# Patient Record
Sex: MALE | Race: WHITE | NOT HISPANIC OR LATINO | Employment: OTHER | URBAN - METROPOLITAN AREA
[De-identification: names, ages, dates, MRNs, and addresses within clinical notes are randomized per-mention and may not be internally consistent; named-entity substitution may affect disease eponyms.]

---

## 2020-12-07 ENCOUNTER — HOSPITAL ENCOUNTER (EMERGENCY)
Facility: HOSPITAL | Age: 55
Discharge: HOME | End: 2020-12-07
Attending: EMERGENCY MEDICINE

## 2020-12-07 VITALS
HEART RATE: 92 BPM | TEMPERATURE: 99.8 F | DIASTOLIC BLOOD PRESSURE: 103 MMHG | SYSTOLIC BLOOD PRESSURE: 216 MMHG | OXYGEN SATURATION: 96 % | RESPIRATION RATE: 20 BRPM | WEIGHT: 285.94 LBS

## 2020-12-07 DIAGNOSIS — L02.212 ABSCESS OF BACK: Primary | ICD-10-CM

## 2020-12-07 PROCEDURE — 10061 I&D ABSCESS COMP/MULTIPLE: CPT | Performed by: EMERGENCY MEDICINE

## 2020-12-07 PROCEDURE — 99284 EMERGENCY DEPT VISIT MOD MDM: CPT | Performed by: EMERGENCY MEDICINE

## 2020-12-07 PROCEDURE — 99283 EMERGENCY DEPT VISIT LOW MDM: CPT

## 2020-12-07 RX ORDER — LIDOCAINE HYDROCHLORIDE AND EPINEPHRINE 10; 10 MG/ML; UG/ML
20 INJECTION, SOLUTION INFILTRATION; PERINEURAL ONCE
Status: COMPLETED | OUTPATIENT
Start: 2020-12-07 | End: 2020-12-07

## 2020-12-07 RX ORDER — TRAMADOL HYDROCHLORIDE 50 MG/1
1 TABLET ORAL EVERY 6 HOURS PRN
COMMUNITY
Start: 2012-09-11 | End: 2021-05-19

## 2020-12-07 RX ORDER — SULFAMETHOXAZOLE AND TRIMETHOPRIM 800; 160 MG/1; MG/1
1 TABLET ORAL 2 TIMES DAILY
Qty: 14 TABLET | Refills: 0 | Status: SHIPPED | OUTPATIENT
Start: 2020-12-07 | End: 2020-12-14

## 2020-12-07 RX ORDER — SULFAMETHOXAZOLE AND TRIMETHOPRIM 800; 160 MG/1; MG/1
1 TABLET ORAL ONCE
Status: COMPLETED | OUTPATIENT
Start: 2020-12-07 | End: 2020-12-07

## 2020-12-07 RX ADMIN — LIDOCAINE HYDROCHLORIDE AND EPINEPHRINE 20 ML: 10; 10 INJECTION, SOLUTION INFILTRATION; PERINEURAL at 20:41

## 2020-12-07 RX ADMIN — SULFAMETHOXAZOLE AND TRIMETHOPRIM 1 TABLET: 800; 160 TABLET ORAL at 20:41

## 2020-12-09 ENCOUNTER — HOSPITAL ENCOUNTER (EMERGENCY)
Facility: HOSPITAL | Age: 55
Discharge: HOME | End: 2020-12-09
Attending: EMERGENCY MEDICINE

## 2020-12-09 VITALS
WEIGHT: 283.95 LBS | RESPIRATION RATE: 20 BRPM | OXYGEN SATURATION: 97 % | DIASTOLIC BLOOD PRESSURE: 106 MMHG | HEART RATE: 79 BPM | SYSTOLIC BLOOD PRESSURE: 198 MMHG | TEMPERATURE: 98.3 F

## 2020-12-09 DIAGNOSIS — Z51.89 WOUND CHECK, ABSCESS: Primary | ICD-10-CM

## 2020-12-09 PROCEDURE — 99282 EMERGENCY DEPT VISIT SF MDM: CPT

## 2020-12-09 PROCEDURE — 99024 POSTOP FOLLOW-UP VISIT: CPT | Performed by: PHYSICIAN ASSISTANT

## 2021-02-10 ENCOUNTER — OFFICE VISIT (OUTPATIENT)
Dept: FAMILY MEDICINE CLINIC | Facility: CLINIC | Age: 56
End: 2021-02-10
Payer: MEDICARE

## 2021-02-10 VITALS
BODY MASS INDEX: 39.11 KG/M2 | RESPIRATION RATE: 16 BRPM | OXYGEN SATURATION: 97 % | WEIGHT: 279.4 LBS | HEIGHT: 71 IN | SYSTOLIC BLOOD PRESSURE: 144 MMHG | DIASTOLIC BLOOD PRESSURE: 96 MMHG | TEMPERATURE: 99.4 F | HEART RATE: 94 BPM

## 2021-02-10 DIAGNOSIS — Z78.9 NEED FOR FOLLOW-UP BY SOCIAL WORKER: Primary | ICD-10-CM

## 2021-02-10 DIAGNOSIS — I10 ESSENTIAL HYPERTENSION: ICD-10-CM

## 2021-02-10 DIAGNOSIS — L72.3 SEBACEOUS CYST: Primary | ICD-10-CM

## 2021-02-10 DIAGNOSIS — R73.9 HIGH BLOOD SUGAR: ICD-10-CM

## 2021-02-10 PROCEDURE — 99214 OFFICE O/P EST MOD 30 MIN: CPT | Performed by: NURSE PRACTITIONER

## 2021-02-10 RX ORDER — CARVEDILOL 25 MG/1
25 TABLET ORAL 2 TIMES DAILY WITH MEALS
Qty: 60 TABLET | Refills: 1 | Status: SHIPPED | OUTPATIENT
Start: 2021-02-10 | End: 2021-03-25 | Stop reason: SDUPTHER

## 2021-02-10 RX ORDER — LISINOPRIL AND HYDROCHLOROTHIAZIDE 20; 12.5 MG/1; MG/1
1 TABLET ORAL DAILY
Qty: 30 TABLET | Refills: 1 | Status: SHIPPED | OUTPATIENT
Start: 2021-02-10 | End: 2021-03-08 | Stop reason: SDUPTHER

## 2021-02-10 RX ORDER — CARVEDILOL 25 MG/1
25 TABLET ORAL 2 TIMES DAILY WITH MEALS
COMMUNITY
End: 2021-02-10 | Stop reason: SDUPTHER

## 2021-02-10 RX ORDER — VITAMIN E 268 MG
400 CAPSULE ORAL DAILY
COMMUNITY

## 2021-02-10 RX ORDER — ACETAMINOPHEN 500 MG
TABLET ORAL DAILY
COMMUNITY
End: 2022-04-01 | Stop reason: ALTCHOICE

## 2021-02-10 RX ORDER — LISINOPRIL AND HYDROCHLOROTHIAZIDE 20; 12.5 MG/1; MG/1
1 TABLET ORAL DAILY
COMMUNITY
End: 2021-02-10 | Stop reason: SDUPTHER

## 2021-02-10 NOTE — PROGRESS NOTES
Subjective:   Chief Complaint   Patient presents with    Establish Care     boiled on back         Patient ID: Christiano Forrest is a 60 y o  male  Presents today to establish care and also concern over abscess on his back  He was treated in the ER with incision and drainage and Bactrim for 7 days in December  It has since closed up but is become more painful over the 3 weeks  His blood pressure today is elevated and he hasn't had his blood pressure medications for sometime  He has been on tramadol for bilateral upper and lower extremities pain  Pain is described as burning tingling pain that occasionally becomes very sharp  He also has history of high blood sugar  Discussed with him having  contact him to assist with insurance needs  The following portions of the patient's history were reviewed and updated as appropriate: allergies, current medications, past family history, past medical history, past social history, past surgical history and problem list     Review of Systems   Constitutional: Negative for chills, fatigue and fever  Respiratory: Negative for cough and shortness of breath  Cardiovascular: Negative for palpitations  Skin: Positive for wound  Psychiatric/Behavioral: Negative for dysphoric mood  The patient is not nervous/anxious  Objective:  Vitals:    02/10/21 1606   BP: 144/96   BP Location: Left arm   Patient Position: Sitting   Cuff Size: Large   Pulse: 94   Resp: 16   Temp: 99 4 °F (37 4 °C)   TempSrc: Tympanic   SpO2: 97%   Weight: 127 kg (279 lb 6 4 oz)   Height: 5' 11" (1 803 m)      Physical Exam  Constitutional:       Appearance: Normal appearance  He is obese  Cardiovascular:      Rate and Rhythm: Normal rate and regular rhythm  Pulses: Normal pulses  Heart sounds: Normal heart sounds  Pulmonary:      Effort: Pulmonary effort is normal       Breath sounds: Normal breath sounds  No wheezing     Skin:     General: Skin is warm and dry           Neurological:      Mental Status: He is alert and oriented to person, place, and time  Psychiatric:         Mood and Affect: Mood normal          Behavior: Behavior normal            Assessment/Plan:    No problem-specific Assessment & Plan notes found for this encounter  Diagnoses and all orders for this visit:    Sebaceous cyst  -     Ambulatory referral to General Surgery; Future    Essential hypertension  -     carvedilol (COREG) 25 mg tablet; Take 1 tablet (25 mg total) by mouth 2 (two) times a day with meals  -     lisinopril-hydrochlorothiazide (PRINZIDE,ZESTORETIC) 20-12 5 MG per tablet; Take 1 tablet by mouth daily  -     CBC and differential; Future    High blood sugar  -     Comprehensive metabolic panel; Future  -     Hemoglobin A1C; Future    Other orders  -     Discontinue: carvedilol (COREG) 25 mg tablet; Take 25 mg by mouth 2 (two) times a day with meals  -     Discontinue: lisinopril-hydrochlorothiazide (PRINZIDE,ZESTORETIC) 20-12 5 MG per tablet; Take 1 tablet by mouth daily  -     vitamin E, tocopherol, 400 units capsule; Take 400 Units by mouth daily  -     Chromium Picolinate 500 MCG TABS; Take by mouth  -     beta carotene 30 MG capsule; Take 30 mg by mouth daily  -     Calcium Carbonate-Vit D-Min (Calcium 1200) 9189-8301 MG-UNIT CHEW; Chew  -     Potassium 99 MG TABS; Take by mouth  -     [employer] CINNAMON PO; Take by mouth  -     Zinc 50 MG CAPS; Take by mouth  -     Garlic 5483 MG CAPS; Take by mouth  -     Magnesium-Calcium-Folic Acid (MAGNEBIND 176 PO);  Take by mouth

## 2021-02-12 ENCOUNTER — PATIENT OUTREACH (OUTPATIENT)
Dept: FAMILY MEDICINE CLINIC | Facility: CLINIC | Age: 56
End: 2021-02-12

## 2021-02-15 DIAGNOSIS — Z78.9 NEEDS ASSISTANCE WITH COMMUNITY RESOURCES: Primary | ICD-10-CM

## 2021-02-15 NOTE — PROGRESS NOTES
SHERRIE SNOW referral for patient needing assistance with insurance  SHERRIE SNOW has been playing phone tag with patient, but spoke with Dimas Jones, and she will follow up with patient to see if she can assist with MA application  No other psychosocial issues at time of this encounter  SHERRIE SNOW to continue to follow for additional support/resources as needed

## 2021-02-16 ENCOUNTER — PATIENT OUTREACH (OUTPATIENT)
Dept: CASE MANAGEMENT | Facility: HOSPITAL | Age: 56
End: 2021-02-16

## 2021-02-16 NOTE — PROGRESS NOTES
CHW made outreach call to pt to assist with MA application  Pt did not answer left voicemail with contact information

## 2021-02-18 ENCOUNTER — PATIENT OUTREACH (OUTPATIENT)
Dept: CASE MANAGEMENT | Facility: HOSPITAL | Age: 56
End: 2021-02-18

## 2021-02-18 NOTE — PROGRESS NOTES
CHW received referral from SHERRIE Zuniga to assist pt with MA application  CHW made outreach call to pt and introduce myself and explained the process of applying for MA  Pt stated that he receives social security disability only  Pt stated that he would like the MA application to be mailed to him so that he may complete it  CHW will mail out the application and explained to pt if he needs any other help he may contact me  CHW will follow up with pt in two weeks

## 2021-02-20 ENCOUNTER — LAB (OUTPATIENT)
Dept: LAB | Facility: HOSPITAL | Age: 56
End: 2021-02-20

## 2021-02-20 DIAGNOSIS — I10 ESSENTIAL HYPERTENSION: ICD-10-CM

## 2021-02-20 DIAGNOSIS — R73.9 HIGH BLOOD SUGAR: ICD-10-CM

## 2021-02-20 LAB
ALBUMIN SERPL BCP-MCNC: 3.7 G/DL (ref 3.5–5)
ALP SERPL-CCNC: 53 U/L (ref 46–116)
ALT SERPL W P-5'-P-CCNC: 33 U/L (ref 12–78)
ANION GAP SERPL CALCULATED.3IONS-SCNC: 9 MMOL/L (ref 4–13)
AST SERPL W P-5'-P-CCNC: 16 U/L (ref 5–45)
BASOPHILS # BLD AUTO: 0.05 THOUSANDS/ΜL (ref 0–0.1)
BASOPHILS NFR BLD AUTO: 1 % (ref 0–1)
BILIRUB SERPL-MCNC: 0.43 MG/DL (ref 0.2–1)
BUN SERPL-MCNC: 20 MG/DL (ref 5–25)
CALCIUM SERPL-MCNC: 9.2 MG/DL (ref 8.3–10.1)
CHLORIDE SERPL-SCNC: 95 MMOL/L (ref 100–108)
CO2 SERPL-SCNC: 28 MMOL/L (ref 21–32)
CREAT SERPL-MCNC: 1.37 MG/DL (ref 0.6–1.3)
EOSINOPHIL # BLD AUTO: 0.25 THOUSAND/ΜL (ref 0–0.61)
EOSINOPHIL NFR BLD AUTO: 5 % (ref 0–6)
ERYTHROCYTE [DISTWIDTH] IN BLOOD BY AUTOMATED COUNT: 12.7 % (ref 11.6–15.1)
EST. AVERAGE GLUCOSE BLD GHB EST-MCNC: 246 MG/DL
GFR SERPL CREATININE-BSD FRML MDRD: 56 ML/MIN/1.73SQ M
GLUCOSE SERPL-MCNC: 405 MG/DL (ref 65–140)
HBA1C MFR BLD: 10.2 %
HCT VFR BLD AUTO: 41.5 % (ref 36.5–49.3)
HGB BLD-MCNC: 13.6 G/DL (ref 12–17)
IMM GRANULOCYTES # BLD AUTO: 0.01 THOUSAND/UL (ref 0–0.2)
IMM GRANULOCYTES NFR BLD AUTO: 0 % (ref 0–2)
LYMPHOCYTES # BLD AUTO: 1.25 THOUSANDS/ΜL (ref 0.6–4.47)
LYMPHOCYTES NFR BLD AUTO: 23 % (ref 14–44)
MCH RBC QN AUTO: 30.1 PG (ref 26.8–34.3)
MCHC RBC AUTO-ENTMCNC: 32.8 G/DL (ref 31.4–37.4)
MCV RBC AUTO: 92 FL (ref 82–98)
MONOCYTES # BLD AUTO: 0.55 THOUSAND/ΜL (ref 0.17–1.22)
MONOCYTES NFR BLD AUTO: 10 % (ref 4–12)
NEUTROPHILS # BLD AUTO: 3.36 THOUSANDS/ΜL (ref 1.85–7.62)
NEUTS SEG NFR BLD AUTO: 61 % (ref 43–75)
NRBC BLD AUTO-RTO: 0 /100 WBCS
PLATELET # BLD AUTO: 221 THOUSANDS/UL (ref 149–390)
PMV BLD AUTO: 10.3 FL (ref 8.9–12.7)
POTASSIUM SERPL-SCNC: 4.8 MMOL/L (ref 3.5–5.3)
PROT SERPL-MCNC: 8.1 G/DL (ref 6.4–8.2)
RBC # BLD AUTO: 4.52 MILLION/UL (ref 3.88–5.62)
SODIUM SERPL-SCNC: 132 MMOL/L (ref 136–145)
WBC # BLD AUTO: 5.47 THOUSAND/UL (ref 4.31–10.16)

## 2021-02-20 PROCEDURE — 36415 COLL VENOUS BLD VENIPUNCTURE: CPT

## 2021-02-20 PROCEDURE — 80053 COMPREHEN METABOLIC PANEL: CPT

## 2021-02-20 PROCEDURE — 83036 HEMOGLOBIN GLYCOSYLATED A1C: CPT

## 2021-02-20 PROCEDURE — 85025 COMPLETE CBC W/AUTO DIFF WBC: CPT

## 2021-02-25 ENCOUNTER — TELEPHONE (OUTPATIENT)
Dept: FAMILY MEDICINE CLINIC | Facility: CLINIC | Age: 56
End: 2021-02-25

## 2021-02-25 NOTE — TELEPHONE ENCOUNTER
Patient presented today for an appt that he had yesterday  He was very irate and states he was told his appt is Thursday, not Wednesday and refused to leave  I spoke with Jeff Snider and was told to let the patient know that she is unavailable and will call him later today when she is done with patients  He reluctantly left the office and said that he is borrowing a car and wants a script called into his pharmacy in [place] and wants to be called at [phone] because his cell phone number is not in service right now

## 2021-02-26 ENCOUNTER — TELEPHONE (OUTPATIENT)
Dept: FAMILY MEDICINE CLINIC | Facility: CLINIC | Age: 56
End: 2021-02-26

## 2021-02-26 NOTE — TELEPHONE ENCOUNTER
annabel called back again and states you didn't call him on the number he left you so he would like a call back on Monday at [phone]

## 2021-03-01 DIAGNOSIS — IMO0002 TYPE 2 DIABETES, UNCONTROLLED, WITH NEUROPATHY: Primary | ICD-10-CM

## 2021-03-01 RX ORDER — BLOOD-GLUCOSE METER
KIT MISCELLANEOUS DAILY
Qty: 1 KIT | Refills: 0 | Status: SHIPPED | OUTPATIENT
Start: 2021-03-01

## 2021-03-01 RX ORDER — BLOOD SUGAR DIAGNOSTIC
STRIP MISCELLANEOUS
Qty: 100 EACH | Refills: 3 | Status: SHIPPED | OUTPATIENT
Start: 2021-03-01

## 2021-03-01 RX ORDER — GABAPENTIN 100 MG/1
100 CAPSULE ORAL 3 TIMES DAILY
Qty: 90 CAPSULE | Refills: 1 | Status: SHIPPED | OUTPATIENT
Start: 2021-03-01 | End: 2021-03-31 | Stop reason: SDUPTHER

## 2021-03-01 RX ORDER — INSULIN GLARGINE 100 [IU]/ML
5 INJECTION, SOLUTION SUBCUTANEOUS DAILY
Qty: 5 PEN | Refills: 1 | Status: SHIPPED | OUTPATIENT
Start: 2021-03-01 | End: 2021-03-23

## 2021-03-01 NOTE — TELEPHONE ENCOUNTER
Spoke with patient regarding lab work showing that he is a diabetic uncontrolled with an A1c of 10 2 discussed with him we need to begin insulin and he needs an appointment as soon as possible for instruction  Discussed with him I would send a medication at this point to get him started to help lower his blood sugar

## 2021-03-03 ENCOUNTER — TELEPHONE (OUTPATIENT)
Dept: SURGERY | Facility: CLINIC | Age: 56
End: 2021-03-03

## 2021-03-03 NOTE — TELEPHONE ENCOUNTER
Called patient 3 x to schedule appt for Dr Indigo Ruiz    Unable to leave message as voice mail was unintelligible as to whose phone number it was

## 2021-03-04 ENCOUNTER — PATIENT OUTREACH (OUTPATIENT)
Dept: CASE MANAGEMENT | Facility: HOSPITAL | Age: 56
End: 2021-03-04

## 2021-03-04 NOTE — PROGRESS NOTES
CHW made outreach call to patient to follow up regarding MA application that was mailed  Pt did not answer left a message with contact information to call back

## 2021-03-05 NOTE — PROGRESS NOTES
CHW made an outreach call to pt to follow up with the MA process  Pt stated that he already had the MA and has chosen [facility] and is currently looking for a secondary insurance  CHW will be closing the case due to pt not needing any other resources  Did advise if there is anything he made need we can reopen the case

## 2021-03-08 ENCOUNTER — TELEPHONE (OUTPATIENT)
Dept: PHYSICAL THERAPY | Facility: OTHER | Age: 56
End: 2021-03-08

## 2021-03-08 ENCOUNTER — OFFICE VISIT (OUTPATIENT)
Dept: FAMILY MEDICINE CLINIC | Facility: CLINIC | Age: 56
End: 2021-03-08
Payer: MEDICARE

## 2021-03-08 VITALS
TEMPERATURE: 99 F | WEIGHT: 281 LBS | RESPIRATION RATE: 16 BRPM | HEART RATE: 80 BPM | HEIGHT: 71 IN | SYSTOLIC BLOOD PRESSURE: 134 MMHG | OXYGEN SATURATION: 97 % | DIASTOLIC BLOOD PRESSURE: 94 MMHG | BODY MASS INDEX: 39.34 KG/M2

## 2021-03-08 DIAGNOSIS — IMO0002 TYPE 2 DIABETES, UNCONTROLLED, WITH NEUROPATHY: Primary | ICD-10-CM

## 2021-03-08 DIAGNOSIS — M54.9 CHRONIC NECK AND BACK PAIN: ICD-10-CM

## 2021-03-08 DIAGNOSIS — Z12.12 SCREENING FOR COLORECTAL CANCER: ICD-10-CM

## 2021-03-08 DIAGNOSIS — G89.29 CHRONIC NECK AND BACK PAIN: ICD-10-CM

## 2021-03-08 DIAGNOSIS — Z12.11 SCREENING FOR COLORECTAL CANCER: ICD-10-CM

## 2021-03-08 DIAGNOSIS — I10 ESSENTIAL HYPERTENSION: ICD-10-CM

## 2021-03-08 DIAGNOSIS — M54.2 CHRONIC NECK AND BACK PAIN: ICD-10-CM

## 2021-03-08 PROCEDURE — 99214 OFFICE O/P EST MOD 30 MIN: CPT | Performed by: NURSE PRACTITIONER

## 2021-03-08 RX ORDER — LISINOPRIL AND HYDROCHLOROTHIAZIDE 20; 12.5 MG/1; MG/1
2 TABLET ORAL DAILY
Qty: 30 TABLET | Refills: 1 | Status: SHIPPED | OUTPATIENT
Start: 2021-03-08 | End: 2021-03-25 | Stop reason: SDUPTHER

## 2021-03-08 NOTE — PROGRESS NOTES
Subjective:   Chief Complaint   Patient presents with    Diabetes        Patient ID: Kate Dickinson is a 60 y o  male  Presents today for follow up on labs and diabetic instruction  Patient did not bring his diabetic supplies nor his insulin to this visit  States he only has access to a vehicle twice a week and he was unable to go get his Lantus or his diabetic supply  Patient also states that he is well-versed in how to check blood sugars as well as inject insulin  Patient was diagnosed with type 2 diabetes approximately 2012 and has been on medication a couple of times since then  States he tried metformin for a year and secondary to GI symptoms he will not go back on it  A1c on February 20th was 10 2 on absolutely no medication for a significant period of time  Did discuss with patient starting 5 units of Lantus nightly and increase by 5 units every 6th day until blood sugars under 140  Patient was also placed on Jardiance which she has not picked up at this time 10 mg daily  Did discuss with patient going to Endocrinology and diabetic Education  Patient did agreed Endocrinology but refused diabetic Education  Patient did also refused a diabetic foot exam today secondary to lower back pain  Discussed with patient's the urgent nature of getting his blood sugar under control and the risks for cardiac, eye site, CVA, and cardiovascular  Patient is referred to Ophthalmology for a diabetic eye exam   Did discuss with patient he is very resistant to most suggestions and possibly I was not the right fit for his care  Patient did verbalize that he is appreciative of efforts and he just knows what did not work for him in the past   Patient was placed on gabapentin a couple of weeks ago secondary to neuropathy with good relief with 100 mg 3 times a day  He however has multiple injuries and issues with both back and neck that does not allow him a good night sleep and constant chronic pain    Did discuss with patient did not having any records in the system with exactly what was going on with his back would refer him to the Comprehensive Spine program   Blood pressure today is 134/94 patient has restarted his lisinopril 20 mg with hydrochlorothiazide 12 5  Patient stated he had previously been on 40 mg lisinopril with 25 hydrochlorothiazide daily  New script was sent for lisinopril 20 with hydrochlorothiazide 12 52 tablets daily  Patient will come back in 2 weeks for blood pressure check as well as a CMP  More than half of this 20 minute visit spent counseling and coordinating care  The following portions of the patient's history were reviewed and updated as appropriate: allergies, current medications, past family history, past medical history, past social history, past surgical history and problem list     Review of Systems   Constitutional: Negative for chills, fatigue and fever  Respiratory: Negative for cough and shortness of breath  Cardiovascular: Negative for palpitations  Musculoskeletal: Positive for back pain and neck pain  Neurological: Positive for numbness (improved on gabapentin)  Psychiatric/Behavioral: Negative for dysphoric mood  The patient is not nervous/anxious  Objective:  Vitals:    03/08/21 1333   BP: 134/94   BP Location: Left arm   Patient Position: Sitting   Cuff Size: Large   Pulse: 80   Resp: 16   Temp: 99 °F (37 2 °C)   TempSrc: Tympanic   SpO2: 97%   Weight: 127 kg (281 lb)   Height: 5' 11" (1 803 m)      Physical Exam  Vitals signs reviewed  Constitutional:       Appearance: Normal appearance  Cardiovascular:      Rate and Rhythm: Normal rate and regular rhythm  Pulses: Normal pulses  Heart sounds: Normal heart sounds  Pulmonary:      Effort: Pulmonary effort is normal       Breath sounds: Normal breath sounds  Neurological:      Mental Status: He is alert and oriented to person, place, and time     Psychiatric:         Mood and Affect: Mood normal          Behavior: Behavior normal            Assessment/Plan:    No problem-specific Assessment & Plan notes found for this encounter  Diagnoses and all orders for this visit:    Type 2 diabetes, uncontrolled, with neuropathy ([facility] [address] )  -     IRIS Diabetic eye exam  -     Ambulatory referral to Endocrinology; Future  -     Ambulatory referral to Ophthalmology; Future    Essential hypertension  -     lisinopril-hydrochlorothiazide (PRINZIDE,ZESTORETIC) 20-12 5 MG per tablet; Take 2 tablets by mouth daily  -     Comprehensive metabolic panel; Future    Screening for colorectal cancer  -     Cologuard; Future    Chronic neck and back pain  -     Ambulatory Referral to Comprehensive Spine Program; Future    Other orders  -     Cancel: Hepatitis C Antibody ([facility]); Future  -     Cancel: PNEUMOCOCCAL POLYSACCHARIDE VACCINE 23-VALENT =>1YO SQ IM  -     Cancel: HIV 1/2 Antigen/Antibody (4th Generation) w Reflex [facility]; Future  -     Cancel: Ambulatory referral to Gastroenterology;  Future

## 2021-03-08 NOTE — TELEPHONE ENCOUNTER
Voice mail/message left requesting patient to return call to [place] [id number] program including our hours of business and phone number  Kindly asked to LM with Full Name,  and Reminded CB will come from a non- number as the nurses are working remotely/off-site  Referral deferred for f/u attempt per protocol    Note: Nurse rec'd message from Donald Gonsalez at [facility] regarding pts referral to schedule appointment  Nurse returned call to [facility] for patient info  Informed nurse would call & triage

## 2021-03-09 ENCOUNTER — PATIENT OUTREACH (OUTPATIENT)
Dept: FAMILY MEDICINE CLINIC | Facility: CLINIC | Age: 56
End: 2021-03-09

## 2021-03-09 NOTE — PROGRESS NOTES
As per patient, he has [employer] and does not need the MA application our CHW mailed to him  As per documentation, CHW followed up with patient to see if she could assist with the MA application and patient does not want her assistance at this time  No other psychosocial needs at time of this encounter and SHERRIE SNOW is closing patient to Care Management  SHERRIE SNOW remains available for additional support as needed via order

## 2021-03-11 ENCOUNTER — TELEPHONE (OUTPATIENT)
Dept: PHYSICAL THERAPY | Facility: OTHER | Age: 56
End: 2021-03-11

## 2021-03-11 ENCOUNTER — NURSE TRIAGE (OUTPATIENT)
Dept: PHYSICAL THERAPY | Facility: OTHER | Age: 56
End: 2021-03-11

## 2021-03-11 DIAGNOSIS — M54.9 OTHER CHRONIC BACK PAIN: Primary | ICD-10-CM

## 2021-03-11 DIAGNOSIS — G89.29 OTHER CHRONIC BACK PAIN: Primary | ICD-10-CM

## 2021-03-11 NOTE — TELEPHONE ENCOUNTER
Pt LM requesting CB and to use a ph number that is not listed in demographics  Nurse reached out using that number and pt did not answer  Voice mail/message left requesting patient to return call to [facility] program including our hours of business and phone number  Kindly asked to LM with Full Name,  and Reminded CB will come from a non- number as the nurses are working remotely/off-site  4th attempt to connect with patient regarding referral to comp spine  Closed and will await CB from Pt

## 2021-03-11 NOTE — TELEPHONE ENCOUNTER
Voice mail/message left requesting patient to return call to [employer] program including our hours of business and phone number  Kindly asked to LM with Full Name,  and Reminded CB will come from a non- number as the nurses are working remotely/off-site  Referral deferred for f/u attempt per protocol    Note: Pt LM for [facility] requesting CB yesterday afternoon  Nurse reached out and encouraged pt LM if no Live answer including a "good" time to call  Reminded nurses are working off-site and remotely and referral is in our work queue

## 2021-03-11 NOTE — TELEPHONE ENCOUNTER
Additional Information   Negative: Is this related to a work injury?  Negative: Is this related to an MVA?  Negative: Are you currently recieving homecare services?  Negative: Has the patient had unexplained weight loss?  Negative: Does the patient have a fever?  Negative: Is the patient experiencing blood in sputum?  Negative: Is the patient experiencing urine retention?  Negative: Is the patient experiencing acute drop foot or paralysis?  Negative: Has the patient experienced major trauma? (fall from height, high speed collision, direct blow to spine) and is also experiencing nausea, light-headedness, or loss of consciousness?  Affirmative: Is this a chronic condition? Background - Initial Assessment  Clinical complaint: Patient states he has pain through out his entire back with numbness and tingling in the upper and lower extremeties  States he has had the pain for about 20 yrs and it has gotten wore over the lasrt 10 yrs  Patient states he has seen PT, pain management, ortho, and neurosurgery in the past with minimal relief  Date of onset: ovre 20 yrs ago  Frequency of pain: constant  Quality of pain: aching, stabbing and throbbing    Protocols used: [facility] COMPREHENSIVE SPINE PROGRAM PROTOCOL    This RN did review in detail the Comprehensive Spine Program and what we can provide for their back pain  Patient is agreeable to being triaged by this RN and would like to proceed with Physical Therapy  Referral was placed for Physical Therapy at the [facility] site  Patients information was sent to the  to make evaluation appointment  Patient made aware that the PT office  will be calling to schedule the appointment  Patient was provided with the phone number to the PT office  No further questions and/or concerns were voiced by the patient at this time  Patient states understanding of the referral that was placed

## 2021-03-18 ENCOUNTER — TELEPHONE (OUTPATIENT)
Dept: PHYSICAL THERAPY | Facility: OTHER | Age: 56
End: 2021-03-18

## 2021-03-18 NOTE — TELEPHONE ENCOUNTER
Returned patients VM from today  Patient triaged on 3/11/21 and states he has not had a call from PT office yet  Patient again provided with the ph# to that office  Patient states he will be calling soon  No further questions and/or concerns were voiced by the patient at this time  Patient appreciative for the assistance

## 2021-03-22 ENCOUNTER — EVALUATION (OUTPATIENT)
Dept: PHYSICAL THERAPY | Facility: CLINIC | Age: 56
End: 2021-03-22
Payer: MEDICARE

## 2021-03-22 DIAGNOSIS — G89.29 OTHER CHRONIC BACK PAIN: ICD-10-CM

## 2021-03-22 DIAGNOSIS — M54.9 OTHER CHRONIC BACK PAIN: ICD-10-CM

## 2021-03-22 PROCEDURE — 97162 PT EVAL MOD COMPLEX 30 MIN: CPT | Performed by: PHYSICAL THERAPIST

## 2021-03-22 NOTE — TELEPHONE ENCOUNTER
Additional Information   Negative: Is this related to a work injury?  Negative: Is this related to an MVA?  Negative: Are you currently recieving homecare services?  Negative: Has the patient had unexplained weight loss?  Negative: Does the patient have a fever?  Negative: Is the patient experiencing urine retention?  Negative: Is the patient experiencing acute drop foot or paralysis?  Negative: Has the patient experienced major trauma? (fall from height, high speed collision, direct blow to spine) and is also experiencing nausea, light-headedness, or loss of consciousness?  Negative: Is the patient experiencing blood in sputum?  Negative: Is this a chronic condition?     Protocols used: [facility] COMPREHENSIVE SPINE PROGRAM PROTOCOL
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
## 2021-03-22 NOTE — TELEPHONE ENCOUNTER
Additional Information   Negative: Is this related to a work injury?  Negative: Is this related to an MVA?     Protocols used: [facility] COMPREHENSIVE SPINE PROGRAM PROTOCOL

## 2021-03-22 NOTE — TELEPHONE ENCOUNTER
Additional Information   Negative: Is this related to a work injury?  Negative: Is this related to an MVA?  Negative: Are you currently recieving homecare services?  Negative: Has the patient had unexplained weight loss?  Negative: Does the patient have a fever?  Negative: Is the patient experiencing urine retention?     Protocols used: [facility] COMPREHENSIVE SPINE PROGRAM PROTOCOL

## 2021-03-22 NOTE — TELEPHONE ENCOUNTER
Additional Information   Negative: Is this related to a work injury?  Negative: Is this related to an MVA?  Negative: Are you currently recieving homecare services?  Negative: Has the patient had unexplained weight loss?  Negative: Does the patient have a fever?  Negative: Is the patient experiencing urine retention?  Negative: Is the patient experiencing acute drop foot or paralysis?  Negative: Has the patient experienced major trauma? (fall from height, high speed collision, direct blow to spine) and is also experiencing nausea, light-headedness, or loss of consciousness?  Negative: Is the patient experiencing blood in sputum?     Protocols used: [facility] COMPREHENSIVE SPINE PROGRAM PROTOCOL

## 2021-03-22 NOTE — TELEPHONE ENCOUNTER
Additional Information   Negative: Is this related to a work injury?  Negative: Is this related to an MVA?  Negative: Are you currently recieving homecare services?     Protocols used: [facility] COMPREHENSIVE SPINE PROGRAM PROTOCOL

## 2021-03-22 NOTE — TELEPHONE ENCOUNTER
Additional Information   Negative: Is this related to a work injury?  Negative: Is this related to an MVA?  Negative: Are you currently recieving homecare services?  Negative: Has the patient had unexplained weight loss?     Protocols used: [facility] COMPREHENSIVE SPINE PROGRAM PROTOCOL

## 2021-03-22 NOTE — TELEPHONE ENCOUNTER
Additional Information   Negative: Is this related to a work injury?  Negative: Is this related to an MVA?  Negative: Are you currently recieving homecare services?  Negative: Has the patient had unexplained weight loss?  Negative: Does the patient have a fever?  Negative: Is the patient experiencing urine retention?  Negative: Is the patient experiencing acute drop foot or paralysis?  Negative: Has the patient experienced major trauma? (fall from height, high speed collision, direct blow to spine) and is also experiencing nausea, light-headedness, or loss of consciousness?     Protocols used: [facility] COMPREHENSIVE SPINE PROGRAM PROTOCOL

## 2021-03-22 NOTE — TELEPHONE ENCOUNTER
Additional Information   Negative: Is this related to a work injury?     Protocols used: [facility] COMPREHENSIVE SPINE PROGRAM PROTOCOL

## 2021-03-23 ENCOUNTER — CONSULT (OUTPATIENT)
Dept: ENDOCRINOLOGY | Facility: CLINIC | Age: 56
End: 2021-03-23
Payer: MEDICARE

## 2021-03-23 VITALS
BODY MASS INDEX: 38.68 KG/M2 | HEART RATE: 78 BPM | SYSTOLIC BLOOD PRESSURE: 140 MMHG | WEIGHT: 276.3 LBS | TEMPERATURE: 97.3 F | HEIGHT: 71 IN | DIASTOLIC BLOOD PRESSURE: 88 MMHG

## 2021-03-23 DIAGNOSIS — I10 ESSENTIAL HYPERTENSION: ICD-10-CM

## 2021-03-23 DIAGNOSIS — IMO0002 TYPE 2 DIABETES, UNCONTROLLED, WITH NEUROPATHY: Primary | ICD-10-CM

## 2021-03-23 DIAGNOSIS — E66.9 CLASS 2 OBESITY WITHOUT SERIOUS COMORBIDITY WITH BODY MASS INDEX (BMI) OF 38.0 TO 38.9 IN ADULT, UNSPECIFIED OBESITY TYPE: ICD-10-CM

## 2021-03-23 DIAGNOSIS — N18.2 STAGE 2 CHRONIC KIDNEY DISEASE: ICD-10-CM

## 2021-03-23 PROBLEM — E66.812 CLASS 2 OBESITY WITHOUT SERIOUS COMORBIDITY WITH BODY MASS INDEX (BMI) OF 38.0 TO 38.9 IN ADULT: Status: ACTIVE | Noted: 2021-03-23

## 2021-03-23 PROCEDURE — 99205 OFFICE O/P NEW HI 60 MIN: CPT | Performed by: INTERNAL MEDICINE

## 2021-03-23 RX ORDER — MULTIVIT WITH MINERALS/LUTEIN
1000 TABLET ORAL 2 TIMES DAILY
COMMUNITY

## 2021-03-23 RX ORDER — EMPAGLIFLOZIN 25 MG/1
25 TABLET, FILM COATED ORAL EVERY MORNING
Qty: 90 TABLET | Refills: 3 | Status: SHIPPED | OUTPATIENT
Start: 2021-03-23 | End: 2021-11-02 | Stop reason: SDUPTHER

## 2021-03-23 RX ORDER — PSEUDOEPHEDRINE HCL 120 MG
TABLET, EXTENDED RELEASE ORAL DAILY
COMMUNITY
End: 2022-06-09

## 2021-03-23 RX ORDER — INSULIN GLARGINE 100 [IU]/ML
INJECTION, SOLUTION SUBCUTANEOUS
Qty: 15 ML | Refills: 3 | Status: SHIPPED | OUTPATIENT
Start: 2021-03-23 | End: 2021-06-17

## 2021-03-23 NOTE — PATIENT INSTRUCTIONS
- increase jardiance to 25 mg orally daily   - increase lantus to 8 units, switch to bedtime   - check blood sugars before meals and at bedtime  - please send Blood sugar log in 2 weeks  - you are being referred for diabetes education/medical nutrition therapy   Please make an appointment with Ophthalmology for a diabetic eye exam   Please keep well hydrated    Please call your insurance and inquire which of the following would be covered  - GLP-1  Agonist-Victoza, Bydureon, Byetta, ozempic, trulicity     Follow up in 6-8 weeks

## 2021-03-23 NOTE — PROGRESS NOTES
Corrie Jeong 60 y o  male MRN: [id number]    Encounter: [id number]  Referring Provider  Jony Laguna, [address], [address]    Assessment/Plan     1  Type 2 diabetes mellitus on insulin therapy   2  CKD - baseline creatine not known   3  Obesity   4  Neuropathy    poorly controlled with last A1c 10 3%    Discussed different medications that can be used for glycemic management-oral hypoglycemic agents as well as injectable insulin and non-insulin medications (GLP 1 agonist) along with risks, benefits, side effect profile  no history of pancreatitis/MEN syndrome/medullary thyroid carcinoma    Recommend the following at this time  - increase jardiance to 25 mg orally daily   - increase lantus to 8 units   - check blood sugars qAC and HS   - send BG log in 2 weeks  - referred for diabetes education/medical nutrition therapy      patient will inquire about coverage of gop 1 agonist which could help with  Improving glycemic control and weight loss    - Recommended a consistent carbohydrate diet   - weight control and exercise as discussed ( ideal is 30 min, at least 5 times a week)   - home glucose monitoring and goals emphasized, requested patient bring in glucose monitor or log sheets to next visit   - discussed signs and symptoms of hypoglycemia and how to correct them appropriately  - counseled about the long term complications of uncontrolled diabetes, including, Nephropathy, Neuropathy, CVD, Retinopathy and importance  of adherence to diet, treatment plan and life style modifications   - importance of following up with Opthalmology and podiatry   - glycohemoglobin and other lab monitoring discussed, A1c goal value reviewed  - long term diabetic complications discussed  -  Repeat A1c, BMP, check fasting lipid panel, urine microalbumin in 3    4   Hypertension  Blood pressure not well controlled at this time   Patient states that the pressures at home are slightly better  Advised patient to check blood pressures at home at rest, keep a log, return for review, follow-up with primary care  If blood pressures are elevated on home monitoring, will need changes to his regimen  - continue current medications including ACE-I/ ARB      CC: Diabetes    History of Present Illness     HPI:  Rahul Betts is a 60 y o  male presents for a new visit regarding diabetes management  Also has a h/o hypertension      DM history:   Diagnosed many years ago   Says that his BG were high as a child as well   approx 9 years ago  Took medication and now recently since the last 2 months  No known complications of CAD/ CVA  Has CKD, neuropathy   ? H/o kidney rupture in 2012   Last Eye exam:> 10 years ago     Current regimen:   Jardiance 10 mg orally daily    Lantus 5 units in the morning     Says metformin made him, lethargic and so stopped taking it and felt better     Statin: --  ACE-I/ARB:  Lisinopril-HCTZ  Says BP have been better at home       Appetite is good  Denies recent weight gain/ loss  Tries to avoid white bread   taking gabapentin for neuropathy  Right eye vision is cloudy    No chest pain/ SOB  H/o strangulated hernia, stools have been loose since   n  Has some polyuria, drinks well   Exercise: not much,  Limited back back pain   H/o multiple back, knee injuries and concussion   Hands - carpal tunnel, ulnar tunnel     Home glucose monitoring: once a day, recall   Before breakfast: if no night eating 200s, if he eats the BG can be very variable , max 450   No hypoglycemia   Long time ago had hypo symptoms when BG were in the 150s    All other systems were reviewed and are negative      Review of Systems      Historical Information   Past Medical History:   Diagnosis Date    High blood sugar     Hypertension      Past Surgical History:   Procedure Laterality Date    HERNIA REPAIR      KIDNEY SURGERY      MOUTH SURGERY       Social History   Social History     Substance and Sexual Activity   Alcohol Use Yes Comment: ocassional     Social History     Substance and Sexual Activity   Drug Use Not Currently     Social History     Tobacco Use   Smoking Status Never Smoker   Smokeless Tobacco Never Used     Family History: History reviewed  No pertinent family history  Meds/Allergies   Current Outpatient Medications   Medication Sig Dispense Refill    beta carotene 30 MG capsule Take 30 mg by mouth daily      Blood Glucose Monitoring Suppl (OneTouch Verio Sync System) w/Device KIT Use daily Test sugar daily in the morning  (Patient not taking: Reported on 3/8/2021) 1 kit 0    Calcium Carbonate-Vit D-Min (Calcium 1200) 7077-0438 MG-UNIT CHEW Chew      carvedilol (COREG) 25 mg tablet Take 1 tablet (25 mg total) by mouth 2 (two) times a day with meals 60 tablet 1    Chromium Picolinate 500 MCG TABS Take by mouth      [facility] CINNAMON PO Take by mouth      Empagliflozin 10 MG TABS Take 1 tablet (10 mg total) by mouth every morning 30 tablet 2    gabapentin (NEURONTIN) 100 mg capsule Take 1 capsule (100 mg total) by mouth 3 (three) times a day 90 capsule 1    Garlic 6200 MG CAPS Take by mouth      glucose blood (OneTouch Verio) test strip Test sugar daily in the morning   (Patient not taking: Reported on 3/8/2021) 100 each 3    insulin glargine (Lantus SoloStar) 100 units/mL injection pen Inject 5 Units under the skin daily Increase by 5 units every 6 days if AM fasting sugar above 140 (Patient not taking: Reported on 3/8/2021) 5 pen 1    lisinopril-hydrochlorothiazide (PRINZIDE,ZESTORETIC) 20-12 5 MG per tablet Take 2 tablets by mouth daily 30 tablet 1    Magnesium-Calcium-Folic Acid (MAGNEBIND 382 PO) Take by mouth      Potassium 99 MG TABS Take by mouth      traMADol (ULTRAM) 50 mg tablet Take 1 tablet by mouth every 6 (six) hours as needed      vitamin E, tocopherol, 400 units capsule Take 400 Units by mouth daily      Zinc 50 MG CAPS Take by mouth       No current facility-administered medications for this visit  Allergies   Allergen Reactions    Cephalexin Hives    Metformin GI Intolerance       Objective   Vitals: Blood pressure 140/88, pulse 78, temperature (!) 97 3 °F (36 3 °C), height 5' 11" (1 803 m), weight 125 kg (276 lb 4 8 oz)  Physical Exam  Constitutional:       General: He is not in acute distress  Appearance: He is well-developed  He is not diaphoretic  HENT:      Head: Normocephalic and atraumatic  Eyes:      Conjunctiva/sclera: Conjunctivae normal       Pupils: Pupils are equal, round, and reactive to light  Neck:      Musculoskeletal: Normal range of motion and neck supple  Cardiovascular:      Rate and Rhythm: Normal rate and regular rhythm  Heart sounds: Normal heart sounds  No murmur  Pulmonary:      Effort: Pulmonary effort is normal  No respiratory distress  Breath sounds: Normal breath sounds  No wheezing  Abdominal:      General: There is no distension  Palpations: Abdomen is soft  Tenderness: There is no abdominal tenderness  There is no guarding  Skin:     General: Skin is warm and dry  Findings: No erythema or rash  Neurological:      Mental Status: He is alert and oriented to person, place, and time  Psychiatric:         Behavior: Behavior normal          Thought Content: Thought content normal          The history was obtained from the review of the chart, patient      Lab Results:   Lab Results   Component Value Date/Time    Hemoglobin A1C 10 2 (H) 02/20/2021 11:06 AM    WBC 5 47 02/20/2021 11:06 AM    Hemoglobin 13 6 02/20/2021 11:06 AM    Hematocrit 41 5 02/20/2021 11:06 AM    MCV 92 02/20/2021 11:06 AM    Platelets 001 66/93/0554 11:06 AM    BUN 20 02/20/2021 11:06 AM    Potassium 4 8 02/20/2021 11:06 AM    Chloride 95 (L) 02/20/2021 11:06 AM    CO2 28 02/20/2021 11:06 AM    Creatinine 1 37 (H) 02/20/2021 11:06 AM    AST 16 02/20/2021 11:06 AM    ALT 33 02/20/2021 11:06 AM    Albumin 3 7 02/20/2021 11:06 AM           Imaging Studies: I have personally reviewed pertinent reports  Portions of the record may have been created with voice recognition software  Occasional wrong word or "sound a like" substitutions may have occurred due to the inherent limitations of voice recognition software  Read the chart carefully and recognize, using context, where substitutions have occurred

## 2021-03-25 ENCOUNTER — OFFICE VISIT (OUTPATIENT)
Dept: FAMILY MEDICINE CLINIC | Facility: CLINIC | Age: 56
End: 2021-03-25
Payer: MEDICARE

## 2021-03-25 VITALS
HEIGHT: 71 IN | OXYGEN SATURATION: 95 % | BODY MASS INDEX: 39.53 KG/M2 | SYSTOLIC BLOOD PRESSURE: 128 MMHG | TEMPERATURE: 98.5 F | RESPIRATION RATE: 16 BRPM | HEART RATE: 79 BPM | DIASTOLIC BLOOD PRESSURE: 80 MMHG | WEIGHT: 282.4 LBS

## 2021-03-25 DIAGNOSIS — IMO0002 TYPE 2 DIABETES, UNCONTROLLED, WITH NEUROPATHY: Primary | ICD-10-CM

## 2021-03-25 DIAGNOSIS — I10 ESSENTIAL HYPERTENSION: ICD-10-CM

## 2021-03-25 PROCEDURE — G0402 INITIAL PREVENTIVE EXAM: HCPCS | Performed by: NURSE PRACTITIONER

## 2021-03-25 PROCEDURE — 99214 OFFICE O/P EST MOD 30 MIN: CPT | Performed by: NURSE PRACTITIONER

## 2021-03-25 RX ORDER — LISINOPRIL AND HYDROCHLOROTHIAZIDE 20; 12.5 MG/1; MG/1
1 TABLET ORAL 2 TIMES DAILY
Qty: 60 TABLET | Refills: 2 | Status: SHIPPED | OUTPATIENT
Start: 2021-03-25 | End: 2021-07-14 | Stop reason: SDUPTHER

## 2021-03-25 RX ORDER — CARVEDILOL 25 MG/1
25 TABLET ORAL 2 TIMES DAILY WITH MEALS
Qty: 60 TABLET | Refills: 1 | Status: SHIPPED | OUTPATIENT
Start: 2021-03-25 | End: 2021-06-14 | Stop reason: SDUPTHER

## 2021-03-25 NOTE — PATIENT INSTRUCTIONS

## 2021-03-25 NOTE — PROGRESS NOTES
Assessment and Plan:     Problem List Items Addressed This Visit        Endocrine    Type 2 diabetes, uncontrolled, with neuropathy ([facility] [address] ) - Primary       Cardiovascular and Mediastinum    Hypertension    Relevant Medications    lisinopril-hydrochlorothiazide (PRINZIDE,ZESTORETIC) 20-12 5 MG per tablet    carvedilol (COREG) 25 mg tablet      Other Visit Diagnoses     BMI 39 0-39 9,adult        Discussed diet and exercise           Preventive health issues were discussed with patient, and age appropriate screening tests were ordered as noted in patient's After Visit Summary  Personalized health advice and appropriate referrals for health education or preventive services given if needed, as noted in patient's After Visit Summary  History of Present Illness:     Patient presents for Medicare Annual Wellness visit    Patient Care Team:  Aleksandra france PCP - General (Nurse Practitioner)     Problem List:     Patient Active Problem List   Diagnosis    Hypertension    Type 2 diabetes, uncontrolled, with neuropathy ([facility] [address] )    Class 2 obesity without serious comorbidity with body mass index (BMI) of 38 0 to 38 9 in adult    Stage 2 chronic kidney disease      Past Medical and Surgical History:     Past Medical History:   Diagnosis Date    High blood sugar     Hypertension      Past Surgical History:   Procedure Laterality Date    HERNIA REPAIR      KIDNEY SURGERY      MOUTH SURGERY        Family History:     History reviewed  No pertinent family history     Social History:     E-Cigarette/Vaping    E-Cigarette Use Never User      E-Cigarette/Vaping Substances    Nicotine No     THC No     CBD No     Flavoring No     Other No     Unknown No      Social History     Socioeconomic History    Marital status:      Spouse name: None    Number of children: None    Years of education: None    Highest education level: None   Occupational History    None   Social Needs    Financial resource strain: None    Food insecurity     Worry: None     Inability: None    Transportation needs     Medical: None     Non-medical: None   Tobacco Use    Smoking status: Never Smoker    Smokeless tobacco: Never Used   Substance and Sexual Activity    Alcohol use: Yes     Comment: ocassional    Drug use: Not Currently    Sexual activity: None   Lifestyle    Physical activity     Days per week: None     Minutes per session: None    Stress: None   Relationships    Social connections     Talks on phone: None     Gets together: None     Attends Mandaeism service: None     Active member of club or organization: None     Attends meetings of clubs or organizations: None     Relationship status: None    Intimate partner violence     Fear of current or ex partner: None     Emotionally abused: None     Physically abused: None     Forced sexual activity: None   Other Topics Concern    None   Social History Narrative    None      Medications and Allergies:     Current Outpatient Medications   Medication Sig Dispense Refill    Ascorbic Acid (vitamin C) 1000 MG tablet Take 1,000 mg by mouth 2 (two) times a day      beta carotene 30 MG capsule Take 30 mg by mouth daily      Calcium Carbonate-Vit D-Min (Calcium 1200) 0425-0745 MG-UNIT CHEW Chew daily       carvedilol (COREG) 25 mg tablet Take 1 tablet (25 mg total) by mouth 2 (two) times a day with meals 60 tablet 1    Chromium Picolinate 500 MCG TABS Take by mouth daily       [facility] CINNAMON PO Take by mouth 2 (two) times a day       Empagliflozin (Jardiance) 25 MG TABS Take 1 tablet (25 mg total) by mouth every morning 90 tablet 3    gabapentin (NEURONTIN) 100 mg capsule Take 1 capsule (100 mg total) by mouth 3 (three) times a day 90 capsule 1    Garlic 4564 MG CAPS Take by mouth 2 (two) times a day       insulin glargine (Lantus SoloStar) 100 units/mL injection pen Inject 8 units at bedtime 15 mL 3    lisinopril-hydrochlorothiazide (PRINZIDE,ZESTORETIC) 20-12 5 MG per tablet Take 1 tablet by mouth 2 (two) times a day 60 tablet 2    Magnesium (CVS Triple Magnesium Complex) 400 MG CAPS Take by mouth daily      Magnesium-Calcium-Folic Acid (MAGNEBIND 546 PO) Take by mouth daily       Potassium 99 MG TABS Take by mouth daily       traMADol (ULTRAM) 50 mg tablet Take 1 tablet by mouth every 6 (six) hours as needed      vitamin E, tocopherol, 400 units capsule Take 400 Units by mouth daily      Zinc 50 MG CAPS Take by mouth 2 (two) times a day       Blood Glucose Monitoring Suppl (OneTouch Verio Sync System) w/Device KIT Use daily Test sugar daily in the morning  (Patient not taking: Reported on 3/8/2021) 1 kit 0    glucose blood (OneTouch Verio) test strip Test sugar daily in the morning  (Patient not taking: Reported on 3/8/2021) 100 each 3     No current facility-administered medications for this visit  Allergies   Allergen Reactions    Cephalexin Hives    Metformin GI Intolerance      Immunizations: There is no immunization history on file for this patient  Health Maintenance:         Topic Date Due    Colorectal Cancer Screening  Never done    Hepatitis C Screening  03[birth year]08/2024 (Originally 1965)    HIV Screening  04/08/2026 (Originally 2/11/1980)         Topic Date Due    COVID-19 Vaccine (1) Never done      Medicare Health Risk Assessment:     /80 (BP Location: Left arm, Patient Position: Sitting, Cuff Size: Large)   Pulse 79   Temp 98 5 °F (36 9 °C) (Tympanic)   Resp 16   Ht 5' 11" (1 803 m)   Wt 128 kg (282 lb 6 4 oz)   SpO2 95%   BMI 39 39 kg/m²      Michael Butcher is here for his Subsequent Wellness visit  Health Risk Assessment:   Patient rates overall health as good  Patient feels that their physical health rating is same  Patient is very satisfied with their life  Eyesight was rated as slightly worse  Hearing was rated as same  Patient feels that their emotional and mental health rating is same   Patients states they are never, rarely angry  Patient states they are often unusually tired/fatigued  Pain experienced in the last 7 days has been a lot  Patient's pain rating has been 10/10  Patient states that he has experienced no weight loss or gain in last 6 months  Depression Screening:   PHQ-2 Score: 0      Fall Risk Screening: In the past year, patient has experienced: no history of falling in past year      Home Safety:  Patient has trouble with stairs inside or outside of their home  Patient has working smoke alarms and has working carbon monoxide detector  Home safety hazards include: none  Nutrition:   Current diet is Diabetic, Low Carb and Limited junk food  Medications:   Patient is currently taking over-the-counter supplements  OTC medications include: see medication list  Patient is able to manage medications  Activities of Daily Living (ADLs)/Instrumental Activities of Daily Living (IADLs):   Walk and transfer into and out of bed and chair?: Yes  Dress and groom yourself?: Yes    Bathe or shower yourself?: Yes    Feed yourself? Yes  Do your laundry/housekeeping?: Yes  Manage your money, pay your bills and track your expenses?: Yes  Make your own meals?: Yes    Do your own shopping?: Yes    Previous Hospitalizations:   Any hospitalizations or ED visits within the last 12 months?: Yes      Hospitalization Comments: Emergency Department visit in December 2020 for abscess on back, No hospital admissions    Advance Care Planning:   Living will: Yes    Durable POA for healthcare:  Yes    Advanced directive: Yes    Advanced directive counseling given: Yes    Five wishes given: Yes    End of Life Decisions reviewed with patient: Yes      Cognitive Screening:   Provider or family/friend/caregiver concerned regarding cognition?: No    PREVENTIVE SCREENINGS      Cardiovascular Screening:    General: Risks and Benefits Discussed      Diabetes Screening:     General: Screening Not Indicated and History Diabetes      Colorectal Cancer Screening:     General: Risks and Benefits Discussed    Due for: Cologuard      Prostate Cancer Screening:    General: Risks and Benefits Discussed and Patient Declines      Osteoporosis Screening:    General: Risks and Benefits Discussed and Patient Declines      Abdominal Aortic Aneurysm (AAA) Screening:        General: Risks and Benefits Discussed and Patient Declines      Lung Cancer Screening:     General: Screening Not Indicated      Hepatitis C Screening:    General: Screening Current    Screening, Brief Intervention, and Referral to Treatment (SBIRT)    Screening  Typical number of drinks in a day: 0  Typical number of drinks in a week: 0  Interpretation: Low risk drinking behavior  Single Item Drug Screening:  How often have you used an illegal drug (including marijuana) or a prescription medication for non-medical reasons in the past year? never    Single Item Drug Screen Score: 0  Interpretation: Negative screen for possible drug use disorder    Other Counseling Topics:   Car/seat belt/driving safety and calcium and vitamin D intake and regular weightbearing exercise         JAN Velarde

## 2021-03-25 NOTE — PROGRESS NOTES
Subjective:   Chief Complaint   Patient presents with    Medicare Wellness Visit    Follow-up        Patient ID: Sohail Culp is a 60 y o  male  Patient presents for annual wellness exam and to discuss chronic conditions  Patient has a history of DM2  His most recent HgA1c in February 2021 was 10 2  He was seen by Endocrinology yesterday and instructed to begin taking Jardiance 25 mg at night along with Lantus 8 units  Prior to yesterday he was taking 10 mg Jardiance with 5 units of Lantus  His fasting blood glucose in the mornings per patient are averaging 200  He does state that some nights he is up in the middle of the night and will snack so it is not a true "fasting blood glucose"  He is working on making improvements in his diet by cutting out carbohydrates and foods high in sugar  He does drink water and milk  He is taking Lisinopril-Hydrochlorothiazide 20-12 5mg twice a day and Carvedilol 25 mg twice a day for hypertension  His blood pressure today is 128/80  He states that he is concerned because of his recent elevated blood pressure  He has an appointment scheduled with pain management for chronic back pain  He is currently taking Ibuprofen and Tylenol which he states is not helping  He is also taking Gabapentin which he says is working "some but not enough"  The following portions of the patient's history were reviewed and updated as appropriate: allergies, current medications, past family history, past medical history, past social history, past surgical history and problem list     Review of Systems   Constitutional: Negative for chills, fatigue and fever  HENT: Negative for congestion, ear pain, postnasal drip, rhinorrhea, sinus pressure and sore throat  Eyes: Negative for pain and visual disturbance  Respiratory: Positive for cough (occasional)  Negative for shortness of breath and wheezing  Cardiovascular: Negative for chest pain, palpitations and leg swelling  Gastrointestinal: Negative for abdominal pain, blood in stool, constipation, diarrhea, nausea and vomiting  Endocrine: Negative for cold intolerance and heat intolerance  Genitourinary: Negative for discharge, dysuria, flank pain, frequency, scrotal swelling, testicular pain and urgency  Musculoskeletal: Positive for arthralgias, back pain and gait problem  Skin: Negative for rash  Allergic/Immunologic: Negative for environmental allergies and food allergies  Neurological: Positive for headaches  Negative for dizziness  Hematological: Does not bruise/bleed easily  Psychiatric/Behavioral: Negative for dysphoric mood  The patient is not nervous/anxious  Objective:  Vitals:    03/25/21 1305   BP: 128/80   BP Location: Left arm   Patient Position: Sitting   Cuff Size: Large   Pulse: 79   Resp: 16   Temp: 98 5 °F (36 9 °C)   TempSrc: Tympanic   SpO2: 95%   Weight: 128 kg (282 lb 6 4 oz)   Height: 5' 11" (1 803 m)      Physical Exam  Constitutional:       Appearance: Normal appearance  HENT:      Head: Normocephalic and atraumatic  Right Ear: Tympanic membrane normal       Left Ear: Tympanic membrane normal       Nose: Nose normal       Mouth/Throat:      Mouth: Mucous membranes are moist       Pharynx: Oropharynx is clear  Eyes:      Pupils: Pupils are equal, round, and reactive to light  Neck:      Musculoskeletal: Normal range of motion  Cardiovascular:      Rate and Rhythm: Normal rate and regular rhythm  Pulses: Normal pulses  Heart sounds: Normal heart sounds  Pulmonary:      Effort: Pulmonary effort is normal       Breath sounds: Normal breath sounds  Abdominal:      General: Bowel sounds are normal  There is no distension  Tenderness: There is no abdominal tenderness  Musculoskeletal: Normal range of motion  Right lower leg: No edema  Left lower leg: No edema  Skin:     General: Skin is warm and dry     Neurological:      Mental Status: He is alert and oriented to person, place, and time  Psychiatric:         Mood and Affect: Mood normal          Behavior: Behavior normal            Assessment/Plan:    No problem-specific Assessment & Plan notes found for this encounter  Diagnoses and all orders for this visit:    Type 2 diabetes, uncontrolled, with neuropathy ([facility] [address] )  Comments: Followed by endocrinology    Essential hypertension  -     lisinopril-hydrochlorothiazide (PRINZIDE,ZESTORETIC) 20-12 5 MG per tablet; Take 1 tablet by mouth 2 (two) times a day  -     carvedilol (COREG) 25 mg tablet;  Take 1 tablet (25 mg total) by mouth 2 (two) times a day with meals    BMI 39 0-39 9,adult  Comments:  Discussed diet and exercise

## 2021-03-26 ENCOUNTER — CONSULT (OUTPATIENT)
Dept: PAIN MEDICINE | Facility: CLINIC | Age: 56
End: 2021-03-26
Payer: MEDICARE

## 2021-03-26 VITALS
HEIGHT: 71 IN | HEART RATE: 93 BPM | WEIGHT: 281 LBS | TEMPERATURE: 98.7 F | DIASTOLIC BLOOD PRESSURE: 76 MMHG | BODY MASS INDEX: 39.34 KG/M2 | SYSTOLIC BLOOD PRESSURE: 132 MMHG

## 2021-03-26 DIAGNOSIS — M54.40 LOW BACK PAIN WITH SCIATICA, SCIATICA LATERALITY UNSPECIFIED, UNSPECIFIED BACK PAIN LATERALITY, UNSPECIFIED CHRONICITY: Primary | ICD-10-CM

## 2021-03-26 DIAGNOSIS — F41.9 ANXIETY: ICD-10-CM

## 2021-03-26 DIAGNOSIS — M54.16 LUMBAR RADICULOPATHY: ICD-10-CM

## 2021-03-26 DIAGNOSIS — M54.12 CERVICAL RADICULOPATHY: ICD-10-CM

## 2021-03-26 DIAGNOSIS — M79.18 MYOFASCIAL PAIN: ICD-10-CM

## 2021-03-26 DIAGNOSIS — M54.2 NECK PAIN: ICD-10-CM

## 2021-03-26 PROCEDURE — 99204 OFFICE O/P NEW MOD 45 MIN: CPT | Performed by: ANESTHESIOLOGY

## 2021-03-26 RX ORDER — METHOCARBAMOL 750 MG/1
750 TABLET, FILM COATED ORAL EVERY 6 HOURS PRN
Qty: 120 TABLET | Refills: 1 | Status: SHIPPED | OUTPATIENT
Start: 2021-03-26 | End: 2022-04-01 | Stop reason: ALTCHOICE

## 2021-03-26 RX ORDER — DIAZEPAM 5 MG/1
TABLET ORAL
Qty: 2 TABLET | Refills: 0 | Status: SHIPPED | OUTPATIENT
Start: 2021-03-26 | End: 2022-04-01 | Stop reason: ALTCHOICE
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
## 2021-03-26 NOTE — PATIENT INSTRUCTIONS
Chronic Back Pain   WHAT YOU NEED TO KNOW:   What is chronic back pain? Chronic back pain is back pain that lasts 3 months or longer  This may include pain that has not been controlled or does not improve with treatment  Your back pain may cause weakness or pain that spreads to your arms or legs  What causes or increases my risk for chronic back pain? Conditions that affect the spine, joints, or muscles can cause back pain  These may include arthritis, spinal stenosis (narrowing of the spinal column), muscle tension, or breakdown of the spinal discs  The following increase your risk for back pain:  · Aging    · Lack of regular physical activity     · Repeated bending, lifting, or twisting, or lifting heavy items    · Obesity or pregnancy     · Injury from a fall or accident    · Driving, sitting, or standing for long periods    · Bad posture while sitting or standing    How is chronic back pain diagnosed? Your healthcare provider will ask if you have any medical conditions  He or she may ask if you have a history of back pain and how it started  He or she may watch you stand and walk, and check your range of motion  Show him or her where you feel pain and what makes it better or worse  Describe the pain, how bad it is, and how long it lasts  Tell your provider if your pain worsens at night or when you lie on your back  How is chronic back pain treated? · NSAIDs  help decrease swelling and pain or fever  This medicine is available with or without a doctor's order  NSAIDs can cause stomach bleeding or kidney problems in certain people  If you take blood thinner medicine, always ask your healthcare provider if NSAIDs are safe for you  Always read the medicine label and follow directions  · Acetaminophen  decreases pain and fever  It is available without a doctor's order  Ask how much to take and how often to take it  Follow directions   Read the labels of all other medicines you are using to see if they also contain acetaminophen, or ask your doctor or pharmacist  Acetaminophen can cause liver damage if not taken correctly  Do not use more than 4 grams (4,000 milligrams) total of acetaminophen in one day  · Prescription pain medicine  called narcotics or opioids may be given for certain types of chronic pain  Ask your healthcare provider how to take this medicine safely  · Muscle relaxers  help decrease pain and muscle spasms  · Steroids  decrease inflammation that causes pain  · Anesthetic  medicines may be injected in or around a nerve to block pain signals from the nerves  · Antidepressants  may be used to help decrease or prevent the symptoms of depression or anxiety  They are also used to treat nerve pain  How can I manage my symptoms? · Apply ice for 15 to 20 minutes every hour, or as directed  Use an ice pack, or put crushed ice in a plastic bag  Cover it with a towel before you apply it to your skin  Ice decreases pain and helps prevent tissue damage  · Apply heat for 20 to 30 minutes every 2 hours, or as directed  Heat helps decrease pain and muscle spasms  · Use massage to loosen tense muscles  Massage may relieve back pain caused by tight muscles  Regular massages may help prevent this kind of back pain  · Ask about acupuncture for pain relief  Back pain is sometimes relieved with acupuncture  Talk to your healthcare provider before you get this treatment to make sure it is safe for you  What else can I do to relieve or prevent back pain? · Manage stress  Stress can cause back pain or make it worse  Some ways to reduce stress are listening to music, meditating, or using aromatherapy  It may help to talk with a therapist about anything that is causing you stress  Your healthcare provider can give you more information  · Stay active as much as you can without causing more pain  Ask your healthcare provider what exercises are right for you   Do not sit or lie down for long periods  This could make your back pain worse  Yoga or similar gentle movements may help relieve pain and tension in your back  Go slowly and do not strain your back as you do any movement  · Be careful when you lift heavy objects  Do not lift anything heavy until your pain is gone  Never strain your back when you lift a heavy item  If possible, ask someone to help you  · Go to physical therapy as directed  A physical therapist can teach you exercises to help improve movement and strength, and to decrease pain  When should I call my doctor? · You have severe pain  · You have new numbness, tingling, or weakness, especially in your lower back, legs, arms, or genital area  · You lose control of your bladder or bowel movements  · You have a fever or sudden weight loss  · You have new or worse pain  · You have questions or concerns about your condition or care  CARE AGREEMENT:   You have the right to help plan your care  Learn about your health condition and how it may be treated  Discuss treatment options with your healthcare providers to decide what care you want to receive  You always have the right to refuse treatment  The above information is an  only  It is not intended as medical advice for individual conditions or treatments  Talk to your doctor, nurse or pharmacist before following any medical regimen to see if it is safe and effective for you  © Copyright [address] Information is for End User's use only and may not be sold, redistributed or otherwise used for commercial purposes   All illustrations and images included in CareNotes® are the copyrighted property of [employer]  or [address] [place] [address]

## 2021-03-29 DIAGNOSIS — IMO0002 TYPE 2 DIABETES, UNCONTROLLED, WITH NEUROPATHY: Primary | ICD-10-CM

## 2021-03-31 ENCOUNTER — TELEPHONE (OUTPATIENT)
Dept: FAMILY MEDICINE CLINIC | Facility: CLINIC | Age: 56
End: 2021-03-31

## 2021-03-31 DIAGNOSIS — IMO0002 TYPE 2 DIABETES, UNCONTROLLED, WITH NEUROPATHY: ICD-10-CM

## 2021-03-31 RX ORDER — GABAPENTIN 100 MG/1
200 CAPSULE ORAL 3 TIMES DAILY
Qty: 90 CAPSULE | Refills: 1 | Status: SHIPPED | OUTPATIENT
Start: 2021-03-31 | End: 2021-04-30 | Stop reason: SDUPTHER

## 2021-04-05 ENCOUNTER — OFFICE VISIT (OUTPATIENT)
Dept: DIABETES SERVICES | Facility: CLINIC | Age: 56
End: 2021-04-05
Payer: MEDICARE

## 2021-04-05 ENCOUNTER — DOCUMENTATION (OUTPATIENT)
Dept: FAMILY MEDICINE CLINIC | Facility: CLINIC | Age: 56
End: 2021-04-05

## 2021-04-05 VITALS — BODY MASS INDEX: 39.05 KG/M2 | WEIGHT: 280 LBS

## 2021-04-05 DIAGNOSIS — IMO0002 TYPE 2 DIABETES MELLITUS, UNCONTROLLED, WITH NEUROPATHY: Primary | ICD-10-CM

## 2021-04-05 PROCEDURE — 97802 MEDICAL NUTRITION INDIV IN: CPT | Performed by: DIETITIAN, REGISTERED

## 2021-04-05 NOTE — PROGRESS NOTES
Medical Nutrition Therapy        Assessment    Visit Type: Initial visit  Chief complaint/Medical Diagnosis/reason for visit E11 40 (T2DM, uncontrolled with neuropathy)     TOSIN Zamudio was seen today for his initial MNT visit  Patient states, that he has met with a dietitian in the past and is here for a refresher  Problems identified in food recall include inconsistent carbohydrate intake at meals, meal skipping and poorly timed meals  Provided patient with carbohydrate guidelines for an 1800 calorie meal plan to assist with consistency, balance and portion control  Encouraged the consumption of regular meals at regular times 4-5 hours apart (no meal skipping)  Advised patient to keep carbohydrate intake to 45 grams per meal and 15 grams per snack to assist with glycemic control  Portion booklet and food labels were used to teach basic carbohydrate counting  Patient agreed to keep daily food logs  Follow-up TBD  RD will remain available for further dietary questions/concerns  Ht Readings from Last 1 Encounters:   03/26/21 5' 11" (1 803 m)     Wt Readings from Last 3 Encounters:   04/05/21 127 kg (280 lb)   03/26/21 127 kg (281 lb)   03/25/21 128 kg (282 lb 6 4 oz)     Weight Change: Yes Reported planned weight loss of 5 pounds in the past 3 weeks      Barriers to Learning: no barriers    Do you follow any special diet presently?: No  Who shops: patient  Who cooks: patient    Food Log: Completed via the method of food recall    Breakfast:9:30AM 1 egg sandwich on 2 slices of WG bread OR leftovers; 16-24 oz whole raw milk or water  Morning Snack:none  Lunch:1:00-2:00PM SKIPS 1-2 times a week: couple of pieces of ham or beef (3-4 oz); water  Afternoon Snack: 3:00-4:00PM 1-2 times a week: varies maybe leftovers ex  soup  Dinner:5:00-8:00PM 4 oz ham or beef or a turkey sandwich and mixed veggies, green beans, carrots or 1 cup peas; water or sparkling water  Evening Snack: may have [facility] ice with meds at night  Beverages: water and whole milk  Eating out/Take out:once a week (meatball parm, no bread, no fries or a turkey, ham, roast beef and martin club sandwich)  Exercise nothing beyond daily activities (siatica and neck problems)    Calorie needs 1800 kcals/day Carbs: 45g/meal, 15g/snack         Nutrition Diagnosis:  Overweight/obesity  related to Food and nutrition related knowledge deficit as evidenced by  BMI more than normative standard for age and sex (obesity-grade II 35-39  9)    Intervention: label reading, behavior modification strategies, carbohydrate counting, increased plant based foods, meal timing, meal planning, monitoring portion control, exercise guidelines and food diary     Treatment Goals: Patient will consume 3 meals a day, Patient will count carbohydrates and Inquire about safe exercise  Monitoring and evaluation:    Term code indicator  FH 1 3 2 Food Intake Criteria: Consume 3 melas a day about 4-5 hours apart (no meal skipping)  Term code indicator  FH 1 6 3 Carbohydrate Intake Criteria: 45 grams of carbs per meal and 15 grams per snack  Term code indicator  CH 2 2 Treatments/Therapy/Alternative Medicine Criteria: Speak to MD about a safe level of exercise given neck and back problems  Materials Provided: Portion booklet and food logs    Patients Response to Instruction:  Comprehensiongood  Motivationgood  Expected Compliancegood    Start- Stop: 1:30-2:20  Total Minutes: 50 Minutes  Group or Individual Instruction: MNT-I  Other: Harrison Burroughs MD    Thank you for coming to the [facility] for education today  Please feel free to call with any questions or concerns      [address] [id number]

## 2021-04-05 NOTE — PATIENT INSTRUCTIONS
1  Consume 3 melas a day about 4-5 hours apart (no meal skipping)  2  45 grams of carbs per meal and 15 grams per snack  3  Speak to MD about a safe level of exercise given neck and back problems

## 2021-04-15 LAB — LEFT EYE DIABETIC RETINOPATHY: NORMAL

## 2021-04-29 ENCOUNTER — TELEPHONE (OUTPATIENT)
Dept: FAMILY MEDICINE CLINIC | Facility: CLINIC | Age: 56
End: 2021-04-29

## 2021-04-29 DIAGNOSIS — M54.16 LUMBAR RADICULOPATHY: Primary | ICD-10-CM

## 2021-04-29 DIAGNOSIS — M54.12 CERVICAL RADICULOPATHY: ICD-10-CM

## 2021-04-29 NOTE — TELEPHONE ENCOUNTER
Patient would like to get a call back from you  He would like to discuss some back issue with you  He wants to talk with you  Please call patient when you get a break

## 2021-04-29 NOTE — TELEPHONE ENCOUNTER
Spoke with patient regarding his experience with Dr Niurka Muro with the [facility]   States he was very unhappy it was a very confrontational visit and he would like a referral to Dr Harman Ro who was the present he was originally supposed to see  Did advise will put the referral in for Dr Harman Ro

## 2021-04-30 ENCOUNTER — TELEPHONE (OUTPATIENT)
Dept: PAIN MEDICINE | Facility: MEDICAL CENTER | Age: 56
End: 2021-04-30

## 2021-04-30 DIAGNOSIS — IMO0002 TYPE 2 DIABETES, UNCONTROLLED, WITH NEUROPATHY: ICD-10-CM

## 2021-04-30 NOTE — TELEPHONE ENCOUNTER
Pt would like to TOD to Dr Dianne Martinez   Pt states he does not want to be seen by Dr Levi Smith  Pt gave no reason     Would you release this pt from your care?

## 2021-05-02 RX ORDER — GABAPENTIN 100 MG/1
200 CAPSULE ORAL 3 TIMES DAILY
Qty: 90 CAPSULE | Refills: 0 | Status: SHIPPED | OUTPATIENT
Start: 2021-05-02 | End: 2021-05-19

## 2021-05-03 NOTE — TELEPHONE ENCOUNTER
Geradine Claude, DO  You; Niranjan Lopez, DO; [facility] 1 hour ago (7:11 AM)     He's much closer to the [facility], recommend he schedule an appointment there      Message text

## 2021-05-04 ENCOUNTER — TELEPHONE (OUTPATIENT)
Dept: FAMILY MEDICINE CLINIC | Facility: CLINIC | Age: 56
End: 2021-05-04

## 2021-05-04 ENCOUNTER — OFFICE VISIT (OUTPATIENT)
Dept: ENDOCRINOLOGY | Facility: CLINIC | Age: 56
End: 2021-05-04
Payer: MEDICARE

## 2021-05-04 VITALS
WEIGHT: 277.2 LBS | DIASTOLIC BLOOD PRESSURE: 76 MMHG | SYSTOLIC BLOOD PRESSURE: 128 MMHG | HEART RATE: 88 BPM | TEMPERATURE: 97.6 F | HEIGHT: 71 IN | BODY MASS INDEX: 38.81 KG/M2

## 2021-05-04 DIAGNOSIS — E66.9 CLASS 2 OBESITY WITHOUT SERIOUS COMORBIDITY WITH BODY MASS INDEX (BMI) OF 38.0 TO 38.9 IN ADULT, UNSPECIFIED OBESITY TYPE: ICD-10-CM

## 2021-05-04 DIAGNOSIS — IMO0002 TYPE 2 DIABETES, UNCONTROLLED, WITH NEUROPATHY: Primary | ICD-10-CM

## 2021-05-04 DIAGNOSIS — E55.9 VITAMIN D DEFICIENCY: ICD-10-CM

## 2021-05-04 DIAGNOSIS — N18.2 STAGE 2 CHRONIC KIDNEY DISEASE: ICD-10-CM

## 2021-05-04 PROCEDURE — 99215 OFFICE O/P EST HI 40 MIN: CPT | Performed by: INTERNAL MEDICINE

## 2021-05-04 RX ORDER — SEMAGLUTIDE 1.34 MG/ML
0.25 INJECTION, SOLUTION SUBCUTANEOUS WEEKLY
Qty: 15 ML | Refills: 1 | Status: SHIPPED | OUTPATIENT
Start: 2021-05-04 | End: 2021-08-31 | Stop reason: SDUPTHER

## 2021-05-04 RX ORDER — INDOMETHACIN 50 MG
CAPSULE ORAL 2 TIMES DAILY
COMMUNITY
End: 2022-06-09

## 2021-05-04 NOTE — TELEPHONE ENCOUNTER
Called patient and lmovm that a new referral was put in for a different doctor   Left the name and phone number

## 2021-05-04 NOTE — PROGRESS NOTES
Follow-up Patient Progress Note      CC: follow up of type 2 diabetes    History of Present Illness:   60 yr male with type 2 diabetes diagnosed in 2012 but reported hyperglycemia even at age 11 in 200mg/dL range, HTN, CKD3, peripheral neuropathy, retinopathy and obesity  Last visit was 3/23/21  Since last visit, he reports polyuria, occasional nocturia but no polydipsia  He reports a 5 lb weight loss since last visit  Diet: 5 meals a day  carb counts  Tries to stay below 40gm  Tries to keep below 30%  usually doesn't eat fish or chicken  Activity:limited due to low back pain  Home blood glucose monitoring: checks twice daily  Before breakfast: 180-230mg/dl  Before lunch: 200-270mg/dL  Before dinner: 200-270mg/dL  Bedtime:   Hypoglycemia: No    Current meds: reports intolerance to metformin  Jardiance 25mg qdaily  Lantus 8u QHS  Opthamology: Yes Dr Ailyn Reardon - found to have cataracts bilateral; Retina - mild lt disease but unable to elicit Rt side  Podiatry: No  Influenza: No, usually allergic; COVID - No(had infection)  Dental:  Pancreatitis: No    Ace/ARB: lisinopril  Statin: Not interested in using based on   Thyroid issues: None    Patient Active Problem List   Diagnosis    Hypertension    Type 2 diabetes, uncontrolled, with neuropathy ([address] )    Class 2 obesity without serious comorbidity with body mass index (BMI) of 38 0 to 38 9 in adult    Stage 2 chronic kidney disease    Low back pain with sciatica    Neck pain    Lumbar radiculopathy    Cervical radiculopathy    Myofascial pain     Past Medical History:   Diagnosis Date    High blood sugar     Hypertension       Past Surgical History:   Procedure Laterality Date    HERNIA REPAIR      KIDNEY SURGERY      MOUTH SURGERY        History reviewed  No pertinent family history    Social History     Tobacco Use    Smoking status: Never Smoker    Smokeless tobacco: Never Used   Substance Use Topics    Alcohol use: Yes     Comment: ocassional     Allergies   Allergen Reactions    Cephalexin Hives    Metformin GI Intolerance         Current Outpatient Medications:     Ascorbic Acid (vitamin C) 1000 MG tablet, Take 1,000 mg by mouth 2 (two) times a day, Disp: , Rfl:     beta carotene 30 MG capsule, Take 30 mg by mouth daily, Disp: , Rfl:     carvedilol (COREG) 25 mg tablet, Take 1 tablet (25 mg total) by mouth 2 (two) times a day with meals, Disp: 60 tablet, Rfl: 1    Chromium Picolinate 500 MCG TABS, Take by mouth daily , Disp: , Rfl:     CVS CINNAMON PO, Take by mouth 2 (two) times a day , Disp: , Rfl:     Empagliflozin (Jardiance) 25 MG TABS, Take 1 tablet (25 mg total) by mouth every morning (Patient taking differently: Take 25 mg by mouth every morning ), Disp: 90 tablet, Rfl: 3    gabapentin (NEURONTIN) 100 mg capsule, Take 2 capsules (200 mg total) by mouth 3 (three) times a day, Disp: 90 capsule, Rfl: 0    Garlic 7813 MG CAPS, Take by mouth 2 (two) times a day , Disp: , Rfl:     insulin glargine (Lantus SoloStar) 100 units/mL injection pen, Inject 8 units at bedtime, Disp: 15 mL, Rfl: 3    Insulin Pen Needle 31G X 8 MM MISC, Use daily with insulin, Disp: 100 each, Rfl: 3    lisinopril-hydrochlorothiazide (PRINZIDE,ZESTORETIC) 20-12 5 MG per tablet, Take 1 tablet by mouth 2 (two) times a day, Disp: 60 tablet, Rfl: 2    Magnesium (CVS Triple Magnesium Complex) 400 MG CAPS, Take by mouth daily, Disp: , Rfl:     Misc Natural Products (Blood Sugar Balance) TABS, Take by mouth 2 (two) times a day, Disp: , Rfl:     Multiple Vitamin (MULTI VITAMIN MENS PO), Take by mouth once, Disp: , Rfl:     Potassium 99 MG TABS, Take by mouth daily , Disp: , Rfl:     Turmeric (QC TUMERIC COMPLEX PO), Take by mouth once, Disp: , Rfl:     VITAMIN D PO, Take by mouth 2 (two) times a day, Disp: , Rfl:     vitamin E, tocopherol, 400 units capsule, Take 400 Units by mouth daily, Disp: , Rfl:     Zinc 50 MG CAPS, Take by mouth 2 (two) times a day , Disp: , Rfl:     Blood Glucose Monitoring Suppl (OneTouch Verio Sync System) w/Device KIT, Use daily Test sugar daily in the morning  (Patient not taking: Reported on 3/8/2021), Disp: 1 kit, Rfl: 0    Calcium Carbonate-Vit D-Min (Calcium 1200) 1153-0586 MG-UNIT CHEW, Chew daily , Disp: , Rfl:     diazepam (VALIUM) 5 mg tablet, Take 1 tablet 30 minutes prior to MRI and may take additional tablet immediately prior to MRI as needed for anxiety (Patient not taking: Reported on 5/4/2021), Disp: 2 tablet, Rfl: 0    glucose blood (OneTouch Verio) test strip, Test sugar daily in the morning  (Patient not taking: Reported on 3/8/2021), Disp: 100 each, Rfl: 3    Magnesium-Calcium-Folic Acid (MAGNEBIND 971 PO), Take by mouth daily , Disp: , Rfl:     methocarbamol (ROBAXIN) 750 mg tablet, Take 1 tablet (750 mg total) by mouth every 6 (six) hours as needed for muscle spasms (Patient not taking: Reported on 5/4/2021), Disp: 120 tablet, Rfl: 1    traMADol (ULTRAM) 50 mg tablet, Take 1 tablet by mouth every 6 (six) hours as needed, Disp: , Rfl:     Review of Systems   Constitutional: Positive for activity change, appetite change and fatigue  HENT: Negative  Eyes: Negative  Respiratory: Negative  Cardiovascular: Negative for chest pain  Gastrointestinal: Negative  Endocrine: Positive for polydipsia and polyuria  Genitourinary: Negative  Musculoskeletal: Negative  Skin: Negative  Allergic/Immunologic: Negative  Neurological: Negative  Hematological: Negative  Psychiatric/Behavioral: Negative  All other systems reviewed and are negative  Physical Exam:  Body mass index is 38 66 kg/m²  Temp 97 6 °F (36 4 °C)   Ht 5' 11" (1 803 m)   Wt 126 kg (277 lb 3 2 oz)   BMI 38 66 kg/m²    Vitals:    05/04/21 1326   Weight: 126 kg (277 lb 3 2 oz)        Physical Exam  Constitutional:       Appearance: He is well-developed  HENT:      Head: Normocephalic     Eyes:      Pupils: Pupils are equal, round, and reactive to light  Neck:      Musculoskeletal: Normal range of motion  Thyroid: No thyromegaly  Cardiovascular:      Rate and Rhythm: Normal rate  Heart sounds: Normal heart sounds  Pulmonary:      Effort: Pulmonary effort is normal       Breath sounds: Normal breath sounds  Abdominal:      General: Bowel sounds are normal       Palpations: Abdomen is soft  Musculoskeletal:         General: No deformity  Skin:     Capillary Refill: Capillary refill takes less than 2 seconds  Coloration: Skin is not pale  Findings: No rash  Neurological:      Mental Status: He is alert and oriented to person, place, and time  Labs:   Lab Results   Component Value Date    HGBA1C 10 2 (H) 02/20/2021       No results found for: [id number], TSH, J9FAPCX, N6KBHOP, THYROIDAB    Lab Results   Component Value Date    CREATININE 1 37 (H) 02/20/2021    BUN 20 02/20/2021    K 4 8 02/20/2021    CL 95 (L) 02/20/2021    CO2 28 02/20/2021     eGFR   Date Value Ref Range Status   02/20/2021 56 ml/min/1 73sq m Final       Lab Results   Component Value Date    ALT 33 02/20/2021    AST 16 02/20/2021    ALKPHOS 53 02/20/2021       No results found for: CHOLESTEROL  No results found for: HDL  No results found for: TRIG  No results found for: NONHDLC      Impression:  1  Type 2 diabetes, uncontrolled, with neuropathy (HCC)    2  Stage 2 chronic kidney disease    3  Class 2 obesity without serious comorbidity with body mass index (BMI) of 38 0 to 38 9 in adult, unspecified obesity type         Plan:    Diagnoses and all orders for this visit:    Type 2 diabetes, uncontrolled, with neuropathy ([facility] [address] )  He is uncontrolled with A1c >10%  Goal is <7%  His barriers include dietary indiscretions, inability to have enough activity and insulin resistance  Today, we discussed all aspects of his diabetes care  He is counting carbs  Declined medical fitness program for now   Will try GLP 1 agonists, Rx was done   Once he starts GLP1 agonist, suggested monitor fingersticks and report in 2-3 weeks or earlier if hypoglycemia  Follow up in 6 weeks  -     Microalbumin / creatinine urine ratio; Future  -     Comprehensive metabolic panel; Future  -     Hemoglobin A1C With EAG; Future  -     Lipid panel; Future  -     TSH, 3rd generation; Future  -     T4, free; Future  -     Vitamin D 25 hydroxy; Future  -     Semaglutide,0 25 or 0 5MG/DOS, (Ozempic, 0 25 or 0 5 MG/DOSE,) 2 MG/1 5ML SOPN; Inject 0 25 mg under the skin once a week    Stage 2 chronic kidney disease  On ACE-I  May benefit from early nephrology input   -     Microalbumin / creatinine urine ratio; Future  -     Comprehensive metabolic panel; Future  -     Hemoglobin A1C With EAG; Future  -     Lipid panel; Future  -     TSH, 3rd generation; Future  -     T4, free; Future  -     Vitamin D 25 hydroxy; Future    Class 2 obesity without serious comorbidity with body mass index (BMI) of 38 0 to 38 9 in adult, unspecified obesity type  Monitor on GLP 1 agonist  In future, will need to r/o JEWEL if patient agreeable  -     Microalbumin / creatinine urine ratio; Future  -     Comprehensive metabolic panel; Future  -     Hemoglobin A1C With EAG; Future  -     Lipid panel; Future  -     TSH, 3rd generation; Future  -     T4, free; Future  -     Vitamin D 25 hydroxy; Future    Vitamin D deficiency      I have spent 40 minutes with patient today in which greater than 50% of this time was spent in counseling/coordination of care  Discussed with the patient and all questioned fully answered  He will call me if any problems arise  Educated/ Counseled patient on diagnostic test results, prognosis, risk vs benefit of treatment options, importance of treatment compliance, healthy life and lifestyle choices        [address]

## 2021-05-04 NOTE — TELEPHONE ENCOUNTER
Pt called back in checking on the status of his STEVE  I did let pt know Dr Georgia Sloan did recommend the [facility] office because its closer to him  Pt said that he is actually closer to the [facility] office  Is it ok for scheduling his with Dr Georgia Sloan? He was also being referred to [address] anyway  Please follow up once Dr Pro gives answer thank you    Before I was able to hang up with the pt  He said that he would like for Dr Pro to be honest with him  If he doesn't want to see him due to the BS that Dr Edwin Livingston put him through that's fine  I asked pt out stop cursing at me about that situation  I started to explain the process to him and he cut me off  He then said "to hell with it" and continue to yell and curse  I did disconnect the call

## 2021-05-04 NOTE — TELEPHONE ENCOUNTER
Patient called and is looking for a different pain [facility] office to go to  He had an issue with one of the doctors there and they are now refusing to see him  He would like a name of somewhere else to go

## 2021-05-05 ENCOUNTER — TELEPHONE (OUTPATIENT)
Dept: FAMILY MEDICINE CLINIC | Facility: CLINIC | Age: 56
End: 2021-05-05

## 2021-05-05 NOTE — TELEPHONE ENCOUNTER
Patient called and garyovm that he wants a call back from [employer], but didn't give any other info

## 2021-05-10 ENCOUNTER — TELEPHONE (OUTPATIENT)
Dept: OBGYN CLINIC | Facility: HOSPITAL | Age: 56
End: 2021-05-10

## 2021-05-10 NOTE — TELEPHONE ENCOUNTER
Provided service recovery call on behalf of spine pain  Patient appreciated the call, however, he has decided to go outside of 's for management of his pain

## 2021-05-19 ENCOUNTER — OFFICE VISIT (OUTPATIENT)
Dept: FAMILY MEDICINE CLINIC | Facility: CLINIC | Age: 56
End: 2021-05-19
Payer: MEDICARE

## 2021-05-19 VITALS
HEIGHT: 71 IN | RESPIRATION RATE: 16 BRPM | OXYGEN SATURATION: 95 % | WEIGHT: 272 LBS | TEMPERATURE: 97.7 F | BODY MASS INDEX: 38.08 KG/M2 | SYSTOLIC BLOOD PRESSURE: 124 MMHG | HEART RATE: 77 BPM | DIASTOLIC BLOOD PRESSURE: 82 MMHG

## 2021-05-19 DIAGNOSIS — G62.9 NEUROPATHY: ICD-10-CM

## 2021-05-19 DIAGNOSIS — M54.16 LUMBAR RADICULOPATHY: Primary | ICD-10-CM

## 2021-05-19 DIAGNOSIS — M54.12 CERVICAL RADICULOPATHY: ICD-10-CM

## 2021-05-19 PROBLEM — E66.01 OBESITY, MORBID (HCC): Status: ACTIVE | Noted: 2021-03-23

## 2021-05-19 PROCEDURE — 99214 OFFICE O/P EST MOD 30 MIN: CPT | Performed by: NURSE PRACTITIONER

## 2021-05-19 RX ORDER — GABAPENTIN 300 MG/1
300 CAPSULE ORAL 3 TIMES DAILY
Qty: 90 CAPSULE | Refills: 1 | Status: SHIPPED | OUTPATIENT
Start: 2021-05-19 | End: 2021-07-19 | Stop reason: SDUPTHER

## 2021-05-19 RX ORDER — TRAMADOL HYDROCHLORIDE 50 MG/1
50 TABLET ORAL EVERY 8 HOURS PRN
Qty: 90 TABLET | Refills: 0 | Status: SHIPPED | OUTPATIENT
Start: 2021-05-19 | End: 2021-07-23 | Stop reason: SDUPTHER

## 2021-05-19 RX ORDER — NALOXONE HYDROCHLORIDE 4 MG/.1ML
SPRAY NASAL
Qty: 1 EACH | Refills: 1 | Status: SHIPPED | OUTPATIENT
Start: 2021-05-19 | End: 2022-04-25

## 2021-05-19 NOTE — PROGRESS NOTES
Subjective:   Chief Complaint   Patient presents with    Diabetes     2 month         Patient ID: Breann Jones is a 60 y o  male  Presents to office for follow-up regarding his peripheral neuropathy secondary to untreated diabetes for many years  His A1C from 2/20/2021 was 10 2  He has seen Endocrinology, who is now managing his diabetes  In addition to being on Jardiance 25mg and Lantus 8 units at bedtime daily, he was also placed on Ozempic 0 25mg weekly by Endocrinology  He was placed on Gabapentin 200mg three times a day for the neuropathy in his hands and feet  He states he is getting some relief  Discussed increasing Gabapentin to 300mg three times a day  He was referred to [address] [place] [address]'s comprehensive spine and during his initial evaluation, per patient, the assessment became "contentious " Both patient and provider are now refusing to see each other and he has since scheduled himself with a spinal physician at [facility]  Patient is requesting a refill on his Tramadol  Patient states "I am taking 8 extra strength Tylenol three times a day without relief " Patient stated on previous visit that he was taking similar amounts of Tylenol, but liver function remains within normal limits  Discussed with patient that this is overdosing and can seriously have negative effects with his liver  Discussed with patient refilling Tramadol up until his appointment with [facility], at which time they will become prescriber's for any and all pain medication on his first appointment  Discussed with patient that based on his creatinine of 1 37 that medicating with NSAIDS are not a good idea  Patient has follow-up appointment with Endocrinology in June and stated his appointment with [facility] is in July        The following portions of the patient's history were reviewed and updated as appropriate: allergies, current medications, past family history, past medical history, past social history, past surgical history and problem list     Review of Systems   Constitutional: Negative for chills and fever  Respiratory: Negative for cough and shortness of breath  Cardiovascular: Negative for palpitations  Musculoskeletal: Positive for back pain and neck pain  Neurological: Positive for numbness  Psychiatric/Behavioral: Negative for dysphoric mood  The patient is not nervous/anxious  Objective:  Vitals:    05/19/21 1307   BP: 124/82   BP Location: Left arm   Patient Position: Sitting   Cuff Size: Large   Pulse: 77   Resp: 16   Temp: 97 7 °F (36 5 °C)   TempSrc: Tympanic   SpO2: 95%   Weight: 123 kg (272 lb)   Height: 5' 11" (1 803 m)      Physical Exam  Constitutional:       Appearance: He is obese  Cardiovascular:      Rate and Rhythm: Normal rate and regular rhythm  Pulses: Normal pulses  Heart sounds: Normal heart sounds  Pulmonary:      Effort: Pulmonary effort is normal       Breath sounds: Normal breath sounds  Skin:     General: Skin is warm and dry  Neurological:      General: No focal deficit present  Mental Status: He is alert and oriented to person, place, and time  Psychiatric:         Mood and Affect: Mood normal          Behavior: Behavior is aggressive  Comments: Confrontational and inappropriate  Trying to dictate care  Assessment/Plan:    No problem-specific Assessment & Plan notes found for this encounter  Diagnoses and all orders for this visit:    Lumbar radiculopathy  -     traMADol (ULTRAM) 50 mg tablet; Take 1 tablet (50 mg total) by mouth every 8 (eight) hours as needed for moderate pain  -     naloxone (NARCAN) 4 mg/0 1 mL nasal spray; Administer 1 spray into a nostril  If no response after 2-3 minutes, give another dose in the other nostril using a new spray  Cervical radiculopathy    Neuropathy  Comments:  Diabetic  Orders:  -     gabapentin (NEURONTIN) 300 mg capsule;  Take 1 capsule (300 mg total) by mouth 3 (three) times a day    BMI 37 0-37 9, adult  Comments:  Continue with weight loss

## 2021-05-28 ENCOUNTER — TELEPHONE (OUTPATIENT)
Dept: ENDOCRINOLOGY | Facility: CLINIC | Age: 56
End: 2021-05-28

## 2021-05-28 NOTE — TELEPHONE ENCOUNTER
Patient called having bg's less than 160 without taking any insulin for the past 5 days  He was in the sun and felt dizzy and lightheaded  I asked him to send in a bg log for review

## 2021-06-14 DIAGNOSIS — I10 ESSENTIAL HYPERTENSION: ICD-10-CM

## 2021-06-14 RX ORDER — CARVEDILOL 25 MG/1
25 TABLET ORAL 2 TIMES DAILY WITH MEALS
Qty: 60 TABLET | Refills: 1 | Status: SHIPPED | OUTPATIENT
Start: 2021-06-14

## 2021-06-16 ENCOUNTER — APPOINTMENT (OUTPATIENT)
Dept: LAB | Facility: HOSPITAL | Age: 56
End: 2021-06-16
Attending: INTERNAL MEDICINE
Payer: MEDICARE

## 2021-06-16 DIAGNOSIS — E66.9 CLASS 2 OBESITY WITHOUT SERIOUS COMORBIDITY WITH BODY MASS INDEX (BMI) OF 38.0 TO 38.9 IN ADULT, UNSPECIFIED OBESITY TYPE: ICD-10-CM

## 2021-06-16 DIAGNOSIS — IMO0002 TYPE 2 DIABETES, UNCONTROLLED, WITH NEUROPATHY: ICD-10-CM

## 2021-06-16 DIAGNOSIS — N18.2 STAGE 2 CHRONIC KIDNEY DISEASE: ICD-10-CM

## 2021-06-16 LAB
25(OH)D3 SERPL-MCNC: 44.9 NG/ML (ref 30–100)
ALBUMIN SERPL BCP-MCNC: 3.9 G/DL (ref 3.5–5)
ALP SERPL-CCNC: 43 U/L (ref 46–116)
ALT SERPL W P-5'-P-CCNC: 27 U/L (ref 12–78)
ANION GAP SERPL CALCULATED.3IONS-SCNC: 9 MMOL/L (ref 4–13)
AST SERPL W P-5'-P-CCNC: 24 U/L (ref 5–45)
BILIRUB SERPL-MCNC: 0.52 MG/DL (ref 0.2–1)
BUN SERPL-MCNC: 22 MG/DL (ref 5–25)
CALCIUM SERPL-MCNC: 9.3 MG/DL (ref 8.3–10.1)
CHLORIDE SERPL-SCNC: 102 MMOL/L (ref 100–108)
CHOLEST SERPL-MCNC: 127 MG/DL (ref 50–200)
CO2 SERPL-SCNC: 29 MMOL/L (ref 21–32)
CREAT SERPL-MCNC: 1.34 MG/DL (ref 0.6–1.3)
CREAT UR-MCNC: 58.6 MG/DL
EST. AVERAGE GLUCOSE BLD GHB EST-MCNC: 169 MG/DL
GFR SERPL CREATININE-BSD FRML MDRD: 58 ML/MIN/1.73SQ M
GLUCOSE SERPL-MCNC: 163 MG/DL (ref 65–140)
HBA1C MFR BLD: 7.5 %
HDLC SERPL-MCNC: 34 MG/DL
LDLC SERPL CALC-MCNC: 71 MG/DL (ref 0–100)
MICROALBUMIN UR-MCNC: 24.4 MG/L (ref 0–20)
MICROALBUMIN/CREAT 24H UR: 42 MG/G CREATININE (ref 0–30)
NONHDLC SERPL-MCNC: 93 MG/DL
POTASSIUM SERPL-SCNC: 4.8 MMOL/L (ref 3.5–5.3)
PROT SERPL-MCNC: 7.7 G/DL (ref 6.4–8.2)
SODIUM SERPL-SCNC: 140 MMOL/L (ref 136–145)
T4 FREE SERPL-MCNC: 0.85 NG/DL (ref 0.76–1.46)
TRIGL SERPL-MCNC: 108 MG/DL
TSH SERPL DL<=0.05 MIU/L-ACNC: 1.23 UIU/ML (ref 0.36–3.74)

## 2021-06-16 PROCEDURE — 80061 LIPID PANEL: CPT

## 2021-06-16 PROCEDURE — 82570 ASSAY OF URINE CREATININE: CPT

## 2021-06-16 PROCEDURE — 83036 HEMOGLOBIN GLYCOSYLATED A1C: CPT

## 2021-06-16 PROCEDURE — 84443 ASSAY THYROID STIM HORMONE: CPT

## 2021-06-16 PROCEDURE — 36415 COLL VENOUS BLD VENIPUNCTURE: CPT

## 2021-06-16 PROCEDURE — 84439 ASSAY OF FREE THYROXINE: CPT

## 2021-06-16 PROCEDURE — 80053 COMPREHEN METABOLIC PANEL: CPT

## 2021-06-16 PROCEDURE — 82043 UR ALBUMIN QUANTITATIVE: CPT

## 2021-06-16 PROCEDURE — 82306 VITAMIN D 25 HYDROXY: CPT

## 2021-06-17 ENCOUNTER — OFFICE VISIT (OUTPATIENT)
Dept: ENDOCRINOLOGY | Facility: CLINIC | Age: 56
End: 2021-06-17
Payer: MEDICARE

## 2021-06-17 VITALS
TEMPERATURE: 96.3 F | WEIGHT: 268.4 LBS | HEIGHT: 71 IN | BODY MASS INDEX: 37.57 KG/M2 | DIASTOLIC BLOOD PRESSURE: 80 MMHG | SYSTOLIC BLOOD PRESSURE: 120 MMHG | HEART RATE: 76 BPM

## 2021-06-17 DIAGNOSIS — E11.65 TYPE 2 DIABETES MELLITUS WITH HYPERGLYCEMIA, WITHOUT LONG-TERM CURRENT USE OF INSULIN (HCC): Primary | ICD-10-CM

## 2021-06-17 DIAGNOSIS — I10 ESSENTIAL HYPERTENSION: ICD-10-CM

## 2021-06-17 DIAGNOSIS — N18.2 STAGE 2 CHRONIC KIDNEY DISEASE: ICD-10-CM

## 2021-06-17 DIAGNOSIS — E66.01 CLASS 2 SEVERE OBESITY WITH SERIOUS COMORBIDITY AND BODY MASS INDEX (BMI) OF 37.0 TO 37.9 IN ADULT, UNSPECIFIED OBESITY TYPE (HCC): ICD-10-CM

## 2021-06-17 DIAGNOSIS — G62.9 NEUROPATHY: ICD-10-CM

## 2021-06-17 PROCEDURE — 99214 OFFICE O/P EST MOD 30 MIN: CPT | Performed by: INTERNAL MEDICINE

## 2021-06-17 NOTE — PROGRESS NOTES
Alissa Shankar 60 y o  male MRN: [id number]    Encounter: [id number]      Assessment/Plan     1  Type 2 diabetes mellitus not on insulin therapy with hyperglycemia   2  CKD  3  Neuropathy   4  Obesity   5  Retinopathy  Improved blood sugars with A1c 7 5%   Has discontinued insulin   Recommend the following for now   -continue current regimen   -advised patient to send blood sugar log for review   Based on review, will decide if dose of Ozempic needs to increased     - Total cholesterol 127, triglycerides, LDL within normal limits can, HDL low   -diet and lifestyle as discussed   Discussed use of statin medication to decrease risk of cardiovascular, cerebrovascular disease  Patient said he will think about this and let me know at the next visit     -repeat A1c, BMP, fasting lipid panel in 3 months      6  Hypertension   Urine microalbumin levels elevated x1   - blood pressure at goal   Continue current medications   -repeat BMP, urine microalbumin in 3 months       CC:  Diabetes mellitus     History of Present Illness     HPI:  Alissa Shankar is a 60 y o  male who is here for a follow-up of diabetes mellitus   Also has a past medical history of hypertension, CKD, peripheral neuropathy, retinopathy, obesity     Last seen in clinic 05/04/2021   Last eye exam 04/2021-following up with Ophthalmology     Currently on the following  jardiance 25 mg orally daily   ozempic 0 25 mg subcutaneously weekly     Developed dizziness on 2 days and felt disoriented, ate carbohydrates with improvement in sympotms  Did not check BG but stopped taking lantus   Noticed that BG were ok and did not get the symptoms again so has been off lantus for 3 weeks     checking Bg 2-3 times a day;   On recall 129-170     Appetite good   Is more active at home; working in the yard   Eating better, less carbohydrates as compared to before    Lost an additional 9 lbs   Right eye vision is poor due to cataracts which per history is quick in developing   Met with retina specialist 5/2021 Dr Renato Andrade - will be having cataract surgery in  September 2021 4/2021- 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   Neuropathy - on gabapentin;  Gets paresthesia, at times it feels like he is walking on jelly beans and razor blades  (latter when it rains, so not on most days)     Not on statin medication; does not want      All other systems were reviewed and are negative  Review of Systems    Historical Information   Past Medical History:   Diagnosis Date    High blood sugar     Hypertension      Past Surgical History:   Procedure Laterality Date    HERNIA REPAIR      KIDNEY SURGERY      MOUTH SURGERY       Social History   Social History     Substance and Sexual Activity   Alcohol Use Yes    Comment: ocassional     Social History     Substance and Sexual Activity   Drug Use Never     Social History     Tobacco Use   Smoking Status Never Smoker   Smokeless Tobacco Never Used     Family History: History reviewed  No pertinent family history      Meds/Allergies   Current Outpatient Medications   Medication Sig Dispense Refill    Apple Cider Vinegar 600 MG CAPS Take by mouth daily      Ascorbic Acid (vitamin C) 1000 MG tablet Take 1,000 mg by mouth 2 (two) times a day      Calcium Carbonate-Vit D-Min (Calcium 1200) 9357-8212 MG-UNIT CHEW Chew daily       carvedilol (COREG) 25 mg tablet Take 1 tablet (25 mg total) by mouth 2 (two) times a day with meals 60 tablet 1    Chromium Picolinate 500 MCG TABS Take by mouth daily       CVS CINNAMON PO Take by mouth 2 (two) times a day       Empagliflozin (Jardiance) 25 MG TABS Take 1 tablet (25 mg total) by mouth every morning (Patient taking differently: Take 25 mg by mouth every morning ) 90 tablet 3    gabapentin (NEURONTIN) 300 mg capsule Take 1 capsule (300 mg total) by mouth 3 (three) times a day 90 capsule 1    Garlic 1161 MG CAPS Take by mouth 2 (two) times a day       lisinopril-hydrochlorothiazide (PRINZIDE,ZESTORETIC) 20-12 5 MG per tablet Take 1 tablet by mouth 2 (two) times a day 60 tablet 2    Magnesium (CVS Triple Magnesium Complex) 400 MG CAPS Take by mouth daily      Magnesium-Calcium-Folic Acid (MAGNEBIND 645 PO) Take by mouth daily       Misc Natural Products (Blood Sugar Balance) TABS Take by mouth 2 (two) times a day      Multiple Vitamin (MULTI VITAMIN MENS PO) Take by mouth once      Potassium 99 MG TABS Take by mouth daily       Semaglutide,0 25 or 0 5MG/DOS, (Ozempic, 0 25 or 0 5 MG/DOSE,) 2 MG/1 5ML SOPN Inject 0 25 mg under the skin once a week 15 mL 1    traMADol (ULTRAM) 50 mg tablet Take 1 tablet (50 mg total) by mouth every 8 (eight) hours as needed for moderate pain 90 tablet 0    Turmeric (QC TUMERIC COMPLEX PO) Take 1,000 mg by mouth once Tumeric /curcimin      VITAMIN D PO Take by mouth 2 (two) times a day      vitamin E, tocopherol, 400 units capsule Take 400 Units by mouth daily      Zinc 50 MG CAPS Take by mouth 2 (two) times a day       beta carotene 30 MG capsule Take 30 mg by mouth daily      Blood Glucose Monitoring Suppl (OneTouch Verio Sync System) w/Device KIT Use daily Test sugar daily in the morning  (Patient not taking: Reported on 3/8/2021) 1 kit 0    diazepam (VALIUM) 5 mg tablet Take 1 tablet 30 minutes prior to MRI and may take additional tablet immediately prior to MRI as needed for anxiety (Patient not taking: Reported on 5/4/2021) 2 tablet 0    glucose blood (OneTouch Verio) test strip Test sugar daily in the morning   (Patient not taking: Reported on 3/8/2021) 100 each 3    insulin glargine (Lantus SoloStar) 100 units/mL injection pen Inject 8 units at bedtime (Patient not taking: Reported on 6/17/2021) 15 mL 3    Insulin Pen Needle 31G X 8 MM MISC Use daily with insulin (Patient not taking: Reported on 6/17/2021) 100 each 3    methocarbamol (ROBAXIN) 750 mg tablet Take 1 tablet (750 mg total) by mouth every 6 (six) hours as needed for muscle spasms (Patient not taking: Reported on 5/4/2021) 120 tablet 1    naloxone (NARCAN) 4 mg/0 1 mL nasal spray Administer 1 spray into a nostril  If no response after 2-3 minutes, give another dose in the other nostril using a new spray  (Patient not taking: Reported on 6/17/2021) 1 each 1     No current facility-administered medications for this visit  Allergies   Allergen Reactions    Cephalexin Hives    Metformin GI Intolerance       Objective   Vitals: Blood pressure 120/80, pulse 76, temperature (!) 96 3 °F (35 7 °C), height 5' 11" (1 803 m), weight 122 kg (268 lb 6 4 oz)  Physical Exam  Constitutional:       General: He is not in acute distress  Appearance: He is well-developed  He is not diaphoretic  HENT:      Head: Normocephalic and atraumatic  Eyes:      Conjunctiva/sclera: Conjunctivae normal       Pupils: Pupils are equal, round, and reactive to light  Cardiovascular:      Rate and Rhythm: Normal rate and regular rhythm  Heart sounds: Normal heart sounds  No murmur heard  Pulmonary:      Effort: Pulmonary effort is normal  No respiratory distress  Breath sounds: Normal breath sounds  No wheezing  Abdominal:      General: There is no distension  Palpations: Abdomen is soft  Tenderness: There is no abdominal tenderness  There is no guarding  Musculoskeletal:      Cervical back: Normal range of motion and neck supple  Skin:     General: Skin is warm and dry  Findings: No erythema or rash  Neurological:      Mental Status: He is alert and oriented to person, place, and time  Psychiatric:         Behavior: Behavior normal          Thought Content: Thought content normal          The history was obtained from the review of the chart, patient and family      Lab Results:   Lab Results   Component Value Date/Time    TSH 3RD GENERATON 1 227 06/16/2021 02:32 PM    Free T4 0 85 06/16/2021 02:32 PM     Lab Results   Component Value Date    WBC 5 47 02/20/2021    HGB 13 6 02/20/2021    HCT 41 5 02/20/2021    MCV 92 02/20/2021     02/20/2021     Lab Results   Component Value Date    CREATININE 1 34 (H) 06/16/2021    BUN 22 06/16/2021    K 4 8 06/16/2021     06/16/2021    CO2 29 06/16/2021     Lab Results   Component Value Date    HGBA1C 7 5 (H) 06/16/2021         Imaging Studies:       I have personally reviewed pertinent reports  Portions of the record may have been created with voice recognition software  Occasional wrong word or "sound a like" substitutions may have occurred due to the inherent limitations of voice recognition software  Read the chart carefully and recognize, using context, where substitutions have occurred

## 2021-06-17 NOTE — PATIENT INSTRUCTIONS
Continue current regimen   Send BG log for review   Follow up in 3 months with repeat labs    Please call your insurance and inquire  which of the following would be covered  - GLP-1  Agonist-Victoza, Bydureon, Byetta, ozempic, trulicity

## 2021-07-13 ENCOUNTER — TELEPHONE (OUTPATIENT)
Dept: ENDOCRINOLOGY | Facility: CLINIC | Age: 56
End: 2021-07-13

## 2021-07-13 NOTE — TELEPHONE ENCOUNTER
Received a  Fax from [employer] to initiated a prior authorization for [facility] [facility] 54 prior authorization department @ [phone], no PA needed, pt can get his next refill after 07/17/2021  Reference # [id number]    Called pt,left detailed message on VM

## 2021-07-14 DIAGNOSIS — I10 ESSENTIAL HYPERTENSION: ICD-10-CM

## 2021-07-14 DIAGNOSIS — M54.16 LUMBAR RADICULOPATHY: ICD-10-CM

## 2021-07-14 RX ORDER — LISINOPRIL AND HYDROCHLOROTHIAZIDE 20; 12.5 MG/1; MG/1
1 TABLET ORAL 2 TIMES DAILY
Qty: 60 TABLET | Refills: 2 | OUTPATIENT
Start: 2021-07-14

## 2021-07-14 RX ORDER — TRAMADOL HYDROCHLORIDE 50 MG/1
50 TABLET ORAL EVERY 8 HOURS PRN
Qty: 90 TABLET | Refills: 0 | OUTPATIENT
Start: 2021-07-14

## 2021-07-19 DIAGNOSIS — G62.9 NEUROPATHY: ICD-10-CM

## 2021-07-19 RX ORDER — GABAPENTIN 300 MG/1
300 CAPSULE ORAL 3 TIMES DAILY
Qty: 90 CAPSULE | Refills: 1 | Status: SHIPPED | OUTPATIENT
Start: 2021-07-19 | End: 2021-09-21 | Stop reason: SDUPTHER

## 2021-07-23 ENCOUNTER — TELEPHONE (OUTPATIENT)
Dept: FAMILY MEDICINE CLINIC | Facility: CLINIC | Age: 56
End: 2021-07-23

## 2021-07-23 NOTE — TELEPHONE ENCOUNTER
Patient calling wondering if there is anything Lv Mccloud can prescribe for his pain  Patient went to [facility] and mri was ordered but not until august  Pain has just been getting worse  Please advise if there is anything he can do for pain

## 2021-08-19 ENCOUNTER — FOLLOW UP (OUTPATIENT)
Dept: URBAN - METROPOLITAN AREA CLINIC 6 | Facility: CLINIC | Age: 56
End: 2021-08-19

## 2021-08-19 DIAGNOSIS — H25.21: ICD-10-CM

## 2021-08-19 DIAGNOSIS — H40.012: ICD-10-CM

## 2021-08-19 DIAGNOSIS — H25.812: ICD-10-CM

## 2021-08-19 PROCEDURE — 92136 OPHTHALMIC BIOMETRY: CPT

## 2021-08-19 PROCEDURE — 92012 INTRM OPH EXAM EST PATIENT: CPT

## 2021-08-19 ASSESSMENT — VISUAL ACUITY: OS_SC: 20/80

## 2021-08-24 ENCOUNTER — TELEPHONE (OUTPATIENT)
Dept: FAMILY MEDICINE CLINIC | Facility: CLINIC | Age: 56
End: 2021-08-24

## 2021-08-24 NOTE — TELEPHONE ENCOUNTER
Patient called to cancel appointment  Patient states he is transferring to another practice  Patient felt personalities did not mesh with himself and Antonio Dobbins, had trouble getting medications refilled, etc  Patient states the staff has been wonderful, all the ma's and mr's have been great but just a difference of personality

## 2021-08-31 ENCOUNTER — SURGERY/PROCEDURE (OUTPATIENT)
Dept: URBAN - METROPOLITAN AREA SURGICAL CENTER 6 | Facility: SURGICAL CENTER | Age: 56
End: 2021-08-31

## 2021-08-31 DIAGNOSIS — H43.01: ICD-10-CM

## 2021-08-31 DIAGNOSIS — H25.21: ICD-10-CM

## 2021-08-31 DIAGNOSIS — IMO0002 TYPE 2 DIABETES, UNCONTROLLED, WITH NEUROPATHY: ICD-10-CM

## 2021-08-31 PROCEDURE — 67005 PARTIAL REMOVAL OF EYE FLUID: CPT | Mod: 59,RT

## 2021-08-31 PROCEDURE — 66982 XCAPSL CTRC RMVL CPLX WO ECP: CPT | Mod: 54,RT

## 2021-08-31 RX ORDER — SEMAGLUTIDE 1.34 MG/ML
0.25 INJECTION, SOLUTION SUBCUTANEOUS WEEKLY
Qty: 15 ML | Refills: 1 | Status: SHIPPED | OUTPATIENT
Start: 2021-08-31 | End: 2021-11-02 | Stop reason: SDUPTHER

## 2021-09-01 ENCOUNTER — 1 DAY POST-OP (OUTPATIENT)
Dept: URBAN - METROPOLITAN AREA CLINIC 6 | Facility: CLINIC | Age: 56
End: 2021-09-01

## 2021-09-01 DIAGNOSIS — Z96.1: ICD-10-CM

## 2021-09-01 PROCEDURE — 99024 POSTOP FOLLOW-UP VISIT: CPT

## 2021-09-01 ASSESSMENT — TONOMETRY
OD_IOP_MMHG: 18
OS_IOP_MMHG: 18
OD_IOP_MMHG: 38

## 2021-09-01 ASSESSMENT — VISUAL ACUITY
OD_SC: 20/200
OS_SC: 20/50

## 2021-09-09 ENCOUNTER — 1 WEEK POST-OP (OUTPATIENT)
Dept: URBAN - METROPOLITAN AREA CLINIC 6 | Facility: CLINIC | Age: 56
End: 2021-09-09

## 2021-09-09 DIAGNOSIS — H25.812: ICD-10-CM

## 2021-09-09 DIAGNOSIS — Z96.1: ICD-10-CM

## 2021-09-09 DIAGNOSIS — H40.012: ICD-10-CM

## 2021-09-09 PROCEDURE — 92012 INTRM OPH EXAM EST PATIENT: CPT | Mod: 24

## 2021-09-09 PROCEDURE — 1036F TOBACCO NON-USER: CPT

## 2021-09-09 PROCEDURE — 92136 OPHTHALMIC BIOMETRY: CPT

## 2021-09-09 PROCEDURE — G8427 DOCREV CUR MEDS BY ELIG CLIN: HCPCS

## 2021-09-09 ASSESSMENT — KERATOMETRY
OS_K2POWER_DIOPTERS: 38.50
OS_AXISANGLE2_DEGREES: 90
OS_AXISANGLE_DEGREES: 180
OS_K1POWER_DIOPTERS: 38.50

## 2021-09-09 ASSESSMENT — TONOMETRY: OS_IOP_MMHG: 10

## 2021-09-09 ASSESSMENT — VISUAL ACUITY
OS_SC: 20/50+2
OD_SC: 20/200
OS_GLARE: 20/400

## 2021-09-13 ASSESSMENT — KERATOMETRY
OS_K1POWER_DIOPTERS: 38.50
OS_AXISANGLE_DEGREES: 180
OS_K2POWER_DIOPTERS: 38.50
OS_AXISANGLE2_DEGREES: 90

## 2021-09-14 ENCOUNTER — TELEPHONE (OUTPATIENT)
Dept: ENDOCRINOLOGY | Facility: CLINIC | Age: 56
End: 2021-09-14

## 2021-09-14 NOTE — TELEPHONE ENCOUNTER
How long will the patient be taking prednisolone eyedrops? Blood sugars have trended up slightly however if the eyedrops are going to be used for short period time only, would not recommend making any changes at this time

## 2021-09-14 NOTE — TELEPHONE ENCOUNTER
Patient had eye surgery and is taking predisonlone eye drops, 4 X's a day  He is concerned his bg's are climbing      Log attached for review

## 2021-09-15 NOTE — TELEPHONE ENCOUNTER
Left detailed message with change to medication in the event his bg's remain high  Asked him to call the office and verify receiving this message

## 2021-09-15 NOTE — TELEPHONE ENCOUNTER
If blood sugars persistently remain high, would recommend increasing the Ozempic to 0 5 mg subcutaneously weekly

## 2021-09-16 NOTE — TELEPHONE ENCOUNTER
Patient informed of making adjustment to Ozempic if BG persistently remain high  He verbalized understanding       Pt advised BG this morning was 136

## 2021-09-21 ENCOUNTER — SURGERY/PROCEDURE (OUTPATIENT)
Dept: URBAN - METROPOLITAN AREA SURGICAL CENTER 6 | Facility: SURGICAL CENTER | Age: 56
End: 2021-09-21

## 2021-09-21 DIAGNOSIS — G62.9 NEUROPATHY: ICD-10-CM

## 2021-09-21 DIAGNOSIS — H25.812: ICD-10-CM

## 2021-09-21 PROCEDURE — 66984 XCAPSL CTRC RMVL W/O ECP: CPT | Mod: 54,LT,79,LT

## 2021-09-21 PROCEDURE — 0356T INSERTION OF DRUG-ELUTING IMPLANT (INCLUDING PUNCTAL DILATION AND IMPLANT REMOVAL WHEN PERFORMED) INTO LACRIMAL CANALICULUS, EACH: CPT

## 2021-09-21 RX ORDER — GABAPENTIN 300 MG/1
300 CAPSULE ORAL 3 TIMES DAILY
Qty: 90 CAPSULE | Refills: 1 | Status: SHIPPED | OUTPATIENT
Start: 2021-09-21 | End: 2022-04-01 | Stop reason: ALTCHOICE

## 2021-09-22 ENCOUNTER — 1 DAY POST-OP (OUTPATIENT)
Dept: URBAN - METROPOLITAN AREA CLINIC 6 | Facility: CLINIC | Age: 56
End: 2021-09-22

## 2021-09-22 DIAGNOSIS — Z96.1: ICD-10-CM

## 2021-09-22 PROCEDURE — 99024 POSTOP FOLLOW-UP VISIT: CPT

## 2021-09-22 PROCEDURE — G8427 DOCREV CUR MEDS BY ELIG CLIN: HCPCS

## 2021-09-22 PROCEDURE — 1036F TOBACCO NON-USER: CPT

## 2021-09-22 ASSESSMENT — KERATOMETRY
OS_K1POWER_DIOPTERS: 38.50
OS_K2POWER_DIOPTERS: 38.50
OS_AXISANGLE2_DEGREES: 90
OS_AXISANGLE_DEGREES: 180

## 2021-09-22 ASSESSMENT — TONOMETRY
OS_IOP_MMHG: 23
OD_IOP_MMHG: 14

## 2021-09-22 ASSESSMENT — VISUAL ACUITY
OD_SC: 20/200
OD_PH: 20/100
OS_SC: 20/50

## 2021-09-29 ENCOUNTER — 1 WEEK POST-OP (OUTPATIENT)
Dept: URBAN - METROPOLITAN AREA CLINIC 6 | Facility: CLINIC | Age: 56
End: 2021-09-29

## 2021-09-29 DIAGNOSIS — Z96.1: ICD-10-CM

## 2021-09-29 PROCEDURE — G8427 DOCREV CUR MEDS BY ELIG CLIN: HCPCS

## 2021-09-29 PROCEDURE — 99024 POSTOP FOLLOW-UP VISIT: CPT

## 2021-09-29 PROCEDURE — 1036F TOBACCO NON-USER: CPT

## 2021-09-29 ASSESSMENT — TONOMETRY
OD_IOP_MMHG: 14
OS_IOP_MMHG: 15

## 2021-09-29 ASSESSMENT — KERATOMETRY
OS_AXISANGLE2_DEGREES: 90
OS_K2POWER_DIOPTERS: 38.50
OS_AXISANGLE_DEGREES: 180
OS_K1POWER_DIOPTERS: 38.50

## 2021-09-29 ASSESSMENT — VISUAL ACUITY
OD_PH: 20/80
OD_SC: 20/400
OS_SC: 20/30

## 2021-10-11 ENCOUNTER — PROBLEM (OUTPATIENT)
Dept: URBAN - METROPOLITAN AREA CLINIC 6 | Facility: CLINIC | Age: 56
End: 2021-10-11

## 2021-10-11 DIAGNOSIS — Z96.1: ICD-10-CM

## 2021-10-11 DIAGNOSIS — H20.9: ICD-10-CM

## 2021-10-11 PROCEDURE — 92012 INTRM OPH EXAM EST PATIENT: CPT

## 2021-10-11 PROCEDURE — G8427 DOCREV CUR MEDS BY ELIG CLIN: HCPCS

## 2021-10-11 PROCEDURE — 1036F TOBACCO NON-USER: CPT

## 2021-10-11 ASSESSMENT — KERATOMETRY
OS_AXISANGLE2_DEGREES: 90
OS_AXISANGLE_DEGREES: 180
OS_K1POWER_DIOPTERS: 38.50
OS_K2POWER_DIOPTERS: 38.50

## 2021-10-11 ASSESSMENT — VISUAL ACUITY
OS_PH: 20/25
OD_PH: 20/40
OS_SC: 20/40
OD_SC: 20/200

## 2021-10-27 ENCOUNTER — FOLLOW UP (OUTPATIENT)
Dept: URBAN - METROPOLITAN AREA CLINIC 6 | Facility: CLINIC | Age: 56
End: 2021-10-27

## 2021-10-27 DIAGNOSIS — H20.9: ICD-10-CM

## 2021-10-27 DIAGNOSIS — Z96.1: ICD-10-CM

## 2021-10-27 PROCEDURE — G8427 DOCREV CUR MEDS BY ELIG CLIN: HCPCS

## 2021-10-27 PROCEDURE — 1036F TOBACCO NON-USER: CPT

## 2021-10-27 PROCEDURE — 99024 POSTOP FOLLOW-UP VISIT: CPT

## 2021-10-27 ASSESSMENT — VISUAL ACUITY
OS_PH: 20/25
OD_SC: 20/100+1
OS_SC: 20/40
OD_PH: 20/70

## 2021-10-27 ASSESSMENT — TONOMETRY
OD_IOP_MMHG: 14
OS_IOP_MMHG: 15

## 2021-11-02 ENCOUNTER — OFFICE VISIT (OUTPATIENT)
Dept: ENDOCRINOLOGY | Facility: CLINIC | Age: 56
End: 2021-11-02
Payer: MEDICARE

## 2021-11-02 VITALS
BODY MASS INDEX: 36.16 KG/M2 | WEIGHT: 258.3 LBS | SYSTOLIC BLOOD PRESSURE: 120 MMHG | DIASTOLIC BLOOD PRESSURE: 76 MMHG | HEART RATE: 77 BPM | HEIGHT: 71 IN

## 2021-11-02 DIAGNOSIS — I10 ESSENTIAL HYPERTENSION: ICD-10-CM

## 2021-11-02 DIAGNOSIS — G62.9 NEUROPATHY: ICD-10-CM

## 2021-11-02 DIAGNOSIS — E11.69 TYPE 2 DIABETES MELLITUS WITH OTHER SPECIFIED COMPLICATION, WITHOUT LONG-TERM CURRENT USE OF INSULIN (HCC): Primary | ICD-10-CM

## 2021-11-02 DIAGNOSIS — E66.01 CLASS 2 SEVERE OBESITY WITH SERIOUS COMORBIDITY AND BODY MASS INDEX (BMI) OF 37.0 TO 37.9 IN ADULT, UNSPECIFIED OBESITY TYPE (HCC): ICD-10-CM

## 2021-11-02 DIAGNOSIS — N52.9 ERECTILE DYSFUNCTION, UNSPECIFIED ERECTILE DYSFUNCTION TYPE: ICD-10-CM

## 2021-11-02 DIAGNOSIS — N18.2 STAGE 2 CHRONIC KIDNEY DISEASE: ICD-10-CM

## 2021-11-02 PROCEDURE — 99214 OFFICE O/P EST MOD 30 MIN: CPT | Performed by: INTERNAL MEDICINE

## 2021-11-02 RX ORDER — EMPAGLIFLOZIN 25 MG/1
25 TABLET, FILM COATED ORAL DAILY
Qty: 90 TABLET | Refills: 3 | Status: SHIPPED | OUTPATIENT
Start: 2021-11-02

## 2021-11-02 RX ORDER — PERPHENAZINE 16 MG
600 TABLET ORAL 2 TIMES DAILY
COMMUNITY

## 2021-11-02 RX ORDER — SEMAGLUTIDE 1.34 MG/ML
0.25 INJECTION, SOLUTION SUBCUTANEOUS WEEKLY
Qty: 15 ML | Refills: 3 | Status: SHIPPED | OUTPATIENT
Start: 2021-11-02

## 2021-11-11 ENCOUNTER — APPOINTMENT (OUTPATIENT)
Dept: LAB | Facility: HOSPITAL | Age: 56
End: 2021-11-11
Attending: INTERNAL MEDICINE
Payer: MEDICARE

## 2021-11-11 DIAGNOSIS — E11.65 UNCONTROLLED TYPE 2 DIABETES MELLITUS WITH HYPERGLYCEMIA (HCC): Primary | ICD-10-CM

## 2021-11-11 LAB
ANION GAP SERPL CALCULATED.3IONS-SCNC: 9 MMOL/L (ref 4–13)
BUN SERPL-MCNC: 26 MG/DL (ref 5–25)
CALCIUM SERPL-MCNC: 9.1 MG/DL (ref 8.3–10.1)
CHLORIDE SERPL-SCNC: 102 MMOL/L (ref 100–108)
CHOLEST SERPL-MCNC: 107 MG/DL (ref 50–200)
CO2 SERPL-SCNC: 28 MMOL/L (ref 21–32)
CREAT SERPL-MCNC: 1.52 MG/DL (ref 0.6–1.3)
CREAT UR-MCNC: 76.5 MG/DL
EST. AVERAGE GLUCOSE BLD GHB EST-MCNC: 137 MG/DL
GFR SERPL CREATININE-BSD FRML MDRD: 50 ML/MIN/1.73SQ M
GLUCOSE P FAST SERPL-MCNC: 165 MG/DL (ref 65–99)
HBA1C MFR BLD: 6.4 %
HDLC SERPL-MCNC: 36 MG/DL
LDLC SERPL CALC-MCNC: 55 MG/DL (ref 0–100)
MICROALBUMIN UR-MCNC: 31.1 MG/L (ref 0–20)
MICROALBUMIN/CREAT 24H UR: 41 MG/G CREATININE (ref 0–30)
NONHDLC SERPL-MCNC: 71 MG/DL
POTASSIUM SERPL-SCNC: 4.6 MMOL/L (ref 3.5–5.3)
SODIUM SERPL-SCNC: 139 MMOL/L (ref 136–145)
TRIGL SERPL-MCNC: 80 MG/DL

## 2021-11-11 PROCEDURE — 80048 BASIC METABOLIC PNL TOTAL CA: CPT | Performed by: INTERNAL MEDICINE

## 2021-11-11 PROCEDURE — 83036 HEMOGLOBIN GLYCOSYLATED A1C: CPT

## 2021-11-11 PROCEDURE — 36415 COLL VENOUS BLD VENIPUNCTURE: CPT | Performed by: INTERNAL MEDICINE

## 2021-11-11 PROCEDURE — 82570 ASSAY OF URINE CREATININE: CPT | Performed by: INTERNAL MEDICINE

## 2021-11-11 PROCEDURE — 82043 UR ALBUMIN QUANTITATIVE: CPT | Performed by: INTERNAL MEDICINE

## 2021-11-11 PROCEDURE — 80061 LIPID PANEL: CPT | Performed by: INTERNAL MEDICINE

## 2021-11-15 ENCOUNTER — TELEPHONE (OUTPATIENT)
Dept: ENDOCRINOLOGY | Facility: CLINIC | Age: 56
End: 2021-11-15

## 2021-11-23 ENCOUNTER — TELEPHONE (OUTPATIENT)
Dept: ENDOCRINOLOGY | Facility: CLINIC | Age: 56
End: 2021-11-23

## 2021-11-29 ENCOUNTER — FOLLOW UP (OUTPATIENT)
Dept: URBAN - METROPOLITAN AREA CLINIC 6 | Facility: CLINIC | Age: 56
End: 2021-11-29

## 2021-11-29 DIAGNOSIS — Z96.1: ICD-10-CM

## 2021-11-29 DIAGNOSIS — H20.9: ICD-10-CM

## 2021-11-29 PROCEDURE — 99024 POSTOP FOLLOW-UP VISIT: CPT

## 2021-11-29 PROCEDURE — G8427 DOCREV CUR MEDS BY ELIG CLIN: HCPCS

## 2021-11-29 PROCEDURE — 1036F TOBACCO NON-USER: CPT

## 2021-11-29 ASSESSMENT — VISUAL ACUITY
OU_SC: J2
OD_SC: 20/70
OD_PH: 20/30-2
OS_SC: 20/25-2

## 2021-11-29 ASSESSMENT — TONOMETRY
OS_IOP_MMHG: 24
OD_IOP_MMHG: 17

## 2022-01-10 ENCOUNTER — FOLLOW UP (OUTPATIENT)
Dept: URBAN - METROPOLITAN AREA CLINIC 6 | Facility: CLINIC | Age: 57
End: 2022-01-10

## 2022-01-10 DIAGNOSIS — H20.9: ICD-10-CM

## 2022-01-10 DIAGNOSIS — Z96.1: ICD-10-CM

## 2022-01-10 PROCEDURE — 92012 INTRM OPH EXAM EST PATIENT: CPT

## 2022-01-10 PROCEDURE — 92015 DETERMINE REFRACTIVE STATE: CPT

## 2022-01-10 ASSESSMENT — VISUAL ACUITY
OS_SC: 20/30-2
OD_PH: 20/50
OS_PH: 20/25
OD_SC: 20/60

## 2022-01-10 ASSESSMENT — TONOMETRY
OS_IOP_MMHG: 19
OD_IOP_MMHG: 17

## 2022-02-16 ENCOUNTER — HOSPITAL ENCOUNTER (EMERGENCY)
Facility: HOSPITAL | Age: 57
Discharge: HOME | End: 2022-02-17
Attending: EMERGENCY MEDICINE | Admitting: EMERGENCY MEDICINE
Payer: MEDICARE

## 2022-02-16 ENCOUNTER — APPOINTMENT (EMERGENCY)
Dept: CT IMAGING | Facility: HOSPITAL | Age: 57
End: 2022-02-16
Payer: MEDICARE

## 2022-02-16 ENCOUNTER — APPOINTMENT (EMERGENCY)
Dept: RADIOLOGY | Facility: HOSPITAL | Age: 57
End: 2022-02-16
Payer: MEDICARE

## 2022-02-16 VITALS
HEIGHT: 71 IN | OXYGEN SATURATION: 98 % | HEART RATE: 80 BPM | BODY MASS INDEX: 36.3 KG/M2 | DIASTOLIC BLOOD PRESSURE: 85 MMHG | RESPIRATION RATE: 18 BRPM | SYSTOLIC BLOOD PRESSURE: 133 MMHG | TEMPERATURE: 98.1 F | WEIGHT: 259.26 LBS

## 2022-02-16 DIAGNOSIS — M54.2 NECK PAIN, MUSCULOSKELETAL: ICD-10-CM
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
## 2022-02-16 DIAGNOSIS — R51.9 HEADACHE: ICD-10-CM

## 2022-02-16 PROCEDURE — 90471 IMMUNIZATION ADMIN: CPT

## 2022-02-16 PROCEDURE — 73130 X-RAY EXAM OF HAND: CPT

## 2022-02-16 PROCEDURE — G1004 CDSM NDSC: HCPCS

## 2022-02-16 PROCEDURE — 99284 EMERGENCY DEPT VISIT MOD MDM: CPT

## 2022-02-16 PROCEDURE — 72125 CT NECK SPINE W/O DYE: CPT

## 2022-02-16 PROCEDURE — 73030 X-RAY EXAM OF SHOULDER: CPT

## 2022-02-16 PROCEDURE — 72100 X-RAY EXAM L-S SPINE 2/3 VWS: CPT

## 2022-02-16 PROCEDURE — 70450 CT HEAD/BRAIN W/O DYE: CPT

## 2022-02-16 RX ORDER — ACETAMINOPHEN 325 MG/1
650 TABLET ORAL ONCE
Status: COMPLETED | OUTPATIENT
Start: 2022-02-16 | End: 2022-02-17

## 2022-02-16 RX ORDER — DIPHENHYDRAMINE HCL 25 MG
25 TABLET ORAL ONCE
Status: COMPLETED | OUTPATIENT
Start: 2022-02-16 | End: 2022-02-17

## 2022-02-16 RX ORDER — DOXYCYCLINE HYCLATE 100 MG/1
100 CAPSULE ORAL ONCE
Status: COMPLETED | OUTPATIENT
Start: 2022-02-16 | End: 2022-02-17

## 2022-02-16 RX ORDER — METOCLOPRAMIDE 10 MG/1
10 TABLET ORAL ONCE
Status: COMPLETED | OUTPATIENT
Start: 2022-02-16 | End: 2022-02-17

## 2022-02-17 PROCEDURE — 90715 TDAP VACCINE 7 YRS/> IM: CPT

## 2022-02-17 RX ORDER — CLINDAMYCIN HYDROCHLORIDE 150 MG/1
450 CAPSULE ORAL EVERY 8 HOURS SCHEDULED
Qty: 62 CAPSULE | Refills: 0 | Status: SHIPPED | OUTPATIENT
Start: 2022-02-17 | End: 2022-02-24

## 2022-02-17 RX ORDER — CLINDAMYCIN HYDROCHLORIDE 150 MG/1
450 CAPSULE ORAL ONCE
Status: COMPLETED | OUTPATIENT
Start: 2022-02-17 | End: 2022-02-17

## 2022-02-17 RX ORDER — DOXYCYCLINE HYCLATE 100 MG/1
100 CAPSULE ORAL EVERY 12 HOURS SCHEDULED
Qty: 13 CAPSULE | Refills: 0 | Status: SHIPPED | OUTPATIENT
Start: 2022-02-17 | End: 2022-02-24

## 2022-02-17 RX ADMIN — METOCLOPRAMIDE 10 MG: 10 TABLET ORAL at 00:01

## 2022-02-17 RX ADMIN — TETANUS TOXOID, REDUCED DIPHTHERIA TOXOID AND ACELLULAR PERTUSSIS VACCINE, ADSORBED 0.5 ML: 5; 2.5; 8; 8; 2.5 SUSPENSION INTRAMUSCULAR at 00:04

## 2022-02-17 RX ADMIN — CLINDAMYCIN HYDROCHLORIDE 450 MG: 150 CAPSULE ORAL at 00:44

## 2022-02-17 RX ADMIN — DIPHENHYDRAMINE HCL 25 MG: 25 TABLET, COATED ORAL at 00:01

## 2022-02-17 RX ADMIN — ACETAMINOPHEN 650 MG: 325 TABLET, FILM COATED ORAL at 00:01

## 2022-02-17 RX ADMIN — DOXYCYCLINE 100 MG: 100 CAPSULE ORAL at 00:01

## 2022-02-17 NOTE — ED PROVIDER NOTES
History  Chief Complaint   Patient presents with    Dog Bite     dog bite to right arm  states own dog and dog is utd on vaccinations  pt also states fall 2 weeks ago hitting head  -loc, -thinners  c/o headaches since then    Fall     The patient is a 59 YOM with a history of chronic back pain who presents to the ED for evaluation of a dog bite to his right arm that he sustained last night breaking up a fight between his dogs  He reports the dog is UTD on rabies vaccines, but that he is unsure if he is UTD on tetanus  He reports that, since, he has had right hand pain as he had to punch his dog with his right hand in order to get it off of him  He also reports that 2 weeks ago, he fell onto his right side, during which he hit his head, neck, right shoulder, and right hip  He reports that since, he has had an ongoing headache that is constant but waxes and wanes  He reports that he does not usually suffer from headaches, and that this feels differently than any headache he has had previously  He also reports neck pain and states that he has had worsened numbness in his right hand since the fall  He does have some numbness at baseline as he has a history of herniated discs in the neck  He also reports associated nausea and photophobia, and reports his neck and head pain worsen when he looks upward, extending the neck  Otherwise, he denies chest pain, dyspnea, visual changes, focal weakness, vomiting, dizziness, seizure activity, and or LOC  Prior to Admission Medications   Prescriptions Last Dose Informant Patient Reported? Taking?    Alpha-Lipoic Acid 600 MG CAPS 2/17/2022 at Unknown time  Yes Yes   Sig: Take 600 mg by mouth 2 (two) times a day     Apple Cider Vinegar 300 MG TABS 2/17/2022 at Unknown time  Yes Yes   Sig: Take 300 mg by mouth daily     Ascorbic Acid (vitamin C) 1000 MG tablet 2/17/2022 at Unknown time  Yes Yes   Sig: Take 1,000 mg by mouth 2 (two) times a day   BENFOTIAMINE PO 2/17/2022 at Unknown time  Yes Yes   Sig: Take 300 mg by mouth 2 (two) times a day     Blood Glucose Monitoring Suppl (OneTouch Verio Sync System) w/Device KIT  Self No No   Sig: Use daily Test sugar daily in the morning     Patient not taking: Reported on 3/8/2021   CINNAMON PO   Yes Yes   Sig: Take by mouth   [facility] CINNAMON PO 2/17/2022 at Unknown time Self Yes Yes   Sig: Take by mouth 2 (two) times a day    Calcium Carbonate-Vit D-Min (Calcium 1200) 8043-2725 MG-UNIT CHEW Not Taking at Unknown time Self Yes No   Sig: Chew daily    Patient not taking: Reported on 2/17/2022    Chromium Picolinate 500 MCG TABS 2/17/2022 at Unknown time Self Yes Yes   Sig: Take by mouth daily    Empagliflozin (Jardiance) 25 MG TABS 2/17/2022 at Unknown time  No Yes   Sig: Take 1 tablet (25 mg total) by mouth daily   Garlic 0698 MG CAPS 0/31/4133 at Unknown time Self Yes Yes   Sig: Take by mouth 2 (two) times a day    Magnesium ([facility]S Triple Magnesium Complex) 400 MG CAPS 2/17/2022 at Unknown time  Yes Yes   Sig: Take by mouth daily   Magnesium-Calcium-Folic Acid (MAGNEBIND 990 PO) Not Taking at Unknown time Self Yes No   Sig: Take by mouth daily    Patient not taking: Reported on 2/17/2022    Misc Natural Products (Blood Sugar Balance) TABS   Yes No   Sig: Take by mouth 2 (two) times a day   Multiple Vitamin (MULTI VITAMIN MENS PO) 2/17/2022 at Unknown time  Yes Yes   Sig: Take by mouth once   Potassium 99 MG TABS 2/17/2022 at Unknown time Self Yes Yes   Sig: Take by mouth daily    Semaglutide,0 25 or 0 5MG/DOS, (Ozempic, 0 25 or 0 5 MG/DOSE,) 2 MG/1 5ML SOPN Past Week at Unknown time  No Yes   Sig: Inject 0 25 mg under the skin once a week   Turmeric (QC TUMERIC COMPLEX PO) 2/17/2022 at Unknown time  Yes Yes   Sig: Take 1,000 mg by mouth once Tumeric /curcimin   VITAMIN D PO 2/17/2022 at Unknown time  Yes Yes   Sig: Take by mouth 2 (two) times a day   Zinc 50 MG CAPS 2/17/2022 at Unknown time Self Yes Yes   Sig: Take by mouth 2 (two) times a day beta carotene 30 MG capsule 2/17/2022 at Unknown time Self Yes Yes   Sig: Take 30 mg by mouth daily   carvedilol (COREG) 25 mg tablet 2/17/2022 at Unknown time  No Yes   Sig: Take 1 tablet (25 mg total) by mouth 2 (two) times a day with meals   diazepam (VALIUM) 5 mg tablet   No No   Sig: Take 1 tablet 30 minutes prior to MRI and may take additional tablet immediately prior to MRI as needed for anxiety   Patient not taking: Reported on 5/4/2021   gabapentin (NEURONTIN) 300 mg capsule Not Taking at Unknown time  No No   Sig: Take 1 capsule (300 mg total) by mouth 3 (three) times a day   Patient not taking: Reported on 2/17/2022    glucose blood (OneTouch Verio) test strip  Self No No   Sig: Test sugar daily in the morning  Patient not taking: Reported on 3/8/2021   lisinopril-hydrochlorothiazide (PRINZIDE,ZESTORETIC) 20-12 5 MG per tablet 2/17/2022 at Unknown time  No Yes   Sig: Take 1 tablet by mouth 2 (two) times a day   methocarbamol (ROBAXIN) 750 mg tablet   No No   Sig: Take 1 tablet (750 mg total) by mouth every 6 (six) hours as needed for muscle spasms   Patient not taking: Reported on 5/4/2021   naloxone (NARCAN) 4 mg/0 1 mL nasal spray   No No   Sig: Administer 1 spray into a nostril  If no response after 2-3 minutes, give another dose in the other nostril using a new spray     Patient not taking: Reported on 6/17/2021   traMADol (ULTRAM) 50 mg tablet Not Taking at Unknown time  No No   Sig: Take 1 tablet (50 mg total) by mouth every 12 (twelve) hours as needed for moderate pain   Patient not taking: Reported on 2/17/2022    vitamin E, tocopherol, 400 units capsule 2/17/2022 at Unknown time Self Yes Yes   Sig: Take 400 Units by mouth daily      Facility-Administered Medications: None       Past Medical History:   Diagnosis Date    High blood sugar     Hypertension        Past Surgical History:   Procedure Laterality Date    HERNIA REPAIR      KIDNEY SURGERY      MOUTH SURGERY         History reviewed  No pertinent family history  I have reviewed and agree with the history as documented  E-Cigarette/Vaping    E-Cigarette Use Never User      E-Cigarette/Vaping Substances    Nicotine No     THC No     CBD No     Flavoring No     Other No     Unknown No      Social History     Tobacco Use    Smoking status: Never Smoker    Smokeless tobacco: Never Used   Vaping Use    Vaping Use: Never used   Substance Use Topics    Alcohol use: Yes     Comment: ocassional    Drug use: Never       Review of Systems   Constitutional: Negative for chills and fever  HENT: Negative for congestion and rhinorrhea  Eyes: Positive for photophobia  Negative for visual disturbance  Respiratory: Negative for cough and shortness of breath  Cardiovascular: Negative for chest pain and leg swelling  Gastrointestinal: Positive for nausea  Negative for abdominal pain, constipation, diarrhea and vomiting  Genitourinary: Negative for dysuria and flank pain  Musculoskeletal: Positive for arthralgias, myalgias and neck pain  Negative for gait problem and neck stiffness  Skin: Negative for rash and wound  Neurological: Positive for numbness (right arm, worsened from numbness already present at baseline) and headaches  Negative for dizziness, seizures, syncope, facial asymmetry, weakness and light-headedness  Physical Exam  Physical Exam  Vitals and nursing note reviewed  Constitutional:       Appearance: He is well-developed  HENT:      Head: Normocephalic and atraumatic  Eyes:      Conjunctiva/sclera: Conjunctivae normal    Cardiovascular:      Rate and Rhythm: Normal rate and regular rhythm  Heart sounds: No murmur heard  Pulmonary:      Effort: Pulmonary effort is normal  No respiratory distress  Breath sounds: Normal breath sounds  Abdominal:      Palpations: Abdomen is soft  Tenderness: There is no abdominal tenderness     Musculoskeletal:      Right shoulder: Bony tenderness (scapular) present  No deformity  Decreased range of motion (decreased shoulder flexion secondary to pain)  Right hand: Swelling (mild), tenderness and bony tenderness (overlying the third and fourth MCP) present  Normal strength  Normal capillary refill  Normal pulse  Cervical back: Neck supple  Pain with movement (reports pain with extension), spinous process tenderness (C5-7) and muscular tenderness (right paracervical) present  Normal range of motion  Lumbar back: Tenderness and bony tenderness (L4-5) present  No deformity  Comments: Strength of all extremities 5/5   Skin:     General: Skin is warm and dry  Capillary Refill: Capillary refill takes less than 2 seconds  Comments: Multiple puncture wounds and linear, superficial abrasions to the right posterior forearm, one 1 5 cm laceration, all of which have scab formation in place, no active drainage or bleeding, erythema surrounding the wounds with mild warmth to touch  No streaking     Neurological:      Mental Status: He is alert  GCS: GCS eye subscore is 4  GCS verbal subscore is 5  GCS motor subscore is 6  Cranial Nerves: Cranial nerves are intact  No cranial nerve deficit or facial asymmetry  Motor: Motor function is intact  Coordination: Coordination is intact  Coordination normal  Finger-Nose-Finger Test and Heel to [facility] Test normal       Gait: Gait is intact  Gait normal       Comments: Patient reports decreased sensation to right dorsal thumb and lateral forearm, extinction intact, rest of sensation to hand, wrist, and arm grossly intact   No other deficit          Vital Signs  ED Triage Vitals   Temperature Pulse Respirations Blood Pressure SpO2   02/16/22 2258 02/16/22 2258 02/16/22 2258 02/16/22 2258 02/16/22 2258   98 1 °F (36 7 °C) 80 18 133/85 98 %      Temp Source Heart Rate Source Patient Position - Orthostatic VS BP Location FiO2 (%)   02/16/22 2258 02/16/22 2258 02/16/22 2258 02/16/22 2258 --   Oral Monitor Sitting Left arm       Pain Score       02/17/22 0001       8           Vitals:    02/16/22 2258   BP: 133/85   Pulse: 80   Patient Position - Orthostatic VS: Sitting         Visual Acuity      ED Medications  Medications   metoclopramide (REGLAN) tablet 10 mg (10 mg Oral Given 2/17/22 0001)   diphenhydrAMINE (BENADRYL) tablet 25 mg (25 mg Oral Given 2/17/22 0001)   acetaminophen (TYLENOL) tablet 650 mg (650 mg Oral Given 2/17/22 0001)   tetanus-diphtheria-acellular pertussis (BOOSTRIX) IM injection 0 5 mL (0 5 mL Intramuscular Given 2/17/22 0004)   doxycycline hyclate (VIBRAMYCIN) capsule 100 mg (100 mg Oral Given 2/17/22 0001)   clindamycin (CLEOCIN) capsule 450 mg (450 mg Oral Given 2/17/22 0044)       Diagnostic Studies  Results Reviewed     None                 XR shoulder 2+ views RIGHT   ED Interpretation by Jalyn Perry PA-C (02/17 0010)   No obvious fracture or dislocation        XR hand 3+ views RIGHT   ED Interpretation by Jalyn Perry PA-C (02/17 0010)   No obvious fracture or dislocation        XR lumbar spine 2 or 3 views   ED Interpretation by Jalyn Perry PA-C (02/17 0005)   No obvious fracture or dislocation        CT cervical spine without contrast   Final Result by Aramis Duran DO (02/17 0023)      No cervical spine fracture or traumatic malalignment  Workstation performed: [id number]         CT head without contrast   Final Result by Aramis Duran DO (02/17 0006)      No acute intracranial abnormality                    Workstation performed: [id number]                    Procedures  Procedures         ED Course       MDM  Number of Diagnoses or Management Options  Dog bite, initial encounter: new and does not require workup  Fall, initial encounter: new and requires workup  Headache: new and requires workup  Neck pain, musculoskeletal: new and requires workup     Amount and/or Complexity of Data Reviewed  Tests in the radiology section of CPT®: ordered and reviewed  Review and summarize past medical records: yes    Risk of Complications, Morbidity, and/or Mortality  Presenting problems: high  Management options: moderate    Patient Progress  Patient progress: improved    Patient is a 24-year-old male presents to the emergency department following a dog bite to his right upper extremity  The dog is up-to-date on rabies vaccination, patient not up-to-date on tetanus vaccination  Will give tetanus booster  The dog bite occurred last night, patient does have cellulitic changes surrounding the wounds  Will begin clindamycin and doxycycline as patient has urticarial reaction to Cephalexin  Given wounds have already begun to heal and mechanism, will not perform primary closure  Will clean patient's wounds in ED  He also punched the dog during the attack and now reports pain to his hand; does have TTP of the fourth and fifth MCP with mild swelling  Will obtain XR to r/o fx  The patient also complains of headache, neck pain, and arm numbness following a fall 2 weeks ago  While patient did not have LOC, vision changes, seizure, or vomiting after this fall, he does report persistent headaches unlike any headache he has had in the past  He does report decreased sensation on the right hand and lateral arm on exam  The patient also has midline tenderness to the cervical spine, reports pain with movement of neck with extension  Cervical collar deferred  Will obtain CT of the head and neck due to ongoing pain, and neurologic deficit as sensation deficit  He also reports shoulder pain from fall, does have decreased shoulder flexion and TTP over scapula  He also reports lower back pain, and reports having mild difficulty ambulating since  He does have chronic back pain at baseline, however reports this is worsened  He does have TTP; will obtain XR  XRs without evidence of obvious fracture   CT neck and head normal     Patient's wound was cleaned using wound spray and normal saline  He does report some improvement in his headache following Benadryl, Tylenol and Reglan in ED  Boostrix and first dose of antibiotics was given  At the time of discharge, the patient is in no acute distress  I discussed with the patient the diagnosis, treatment plan, follow-up, strict return precautions (including but not limited to worsening erythema, spreading erythema, fever, chills, purulent drainage, severe pain), and discharge instructions; they were given the opportunity to ask questions and verbalized understanding  Disposition  Final diagnoses:   Dog bite, initial encounter   Fall, initial encounter   Headache   Neck pain, musculoskeletal     Time reflects when diagnosis was documented in both MDM as applicable and the Disposition within this note     Time User Action Codes Description Comment    2/17/2022 12:30 AM [place] Jose Add Ple[place] Kurk  0XXA] Dog bite, initial encounter     2/17/2022 12:30 AM [place] Jose Add [W19  XXXA] Fall, initial encounter     2/17/2022 12:30 AM [place] Jose Add [R51 9] Headache     2/17/2022 12:30 AM Evita Jose Add [M54 2] Neck pain, musculoskeletal       ED Disposition     ED Disposition Condition Date/Time Comment    Discharge Stable u Feb 17, 2022 12:34 AM Michael Jefferson discharge to home/self care              Follow-up Information     Follow up With Specialties Details Why Contact Info Additional Information    Vishal Fernandez MD Family Medicine In 1 week For wound re-check [address] [address] [phone] [address] [facility] Emergency Department Emergency Medicine  If symptoms worsen [facility] [id number]  [address] [place] [address] Emergency Department, [address] [facility] , [facility], [address] [facility], [address]          Discharge Medication List as of 2/17/2022 12:34 AM      START taking these medications    Details   clindamycin (CLEOCIN) 150 mg capsule Take 3 capsules (450 mg total) by mouth every 8 (eight) hours for 7 days, Starting Thu 2/17/2022, Until Thu 2/24/2022, Normal      doxycycline hyclate (VIBRAMYCIN) 100 mg capsule Take 1 capsule (100 mg total) by mouth every 12 (twelve) hours for 7 days, Starting Thu 2/17/2022, Until Thu 2/24/2022, Normal         CONTINUE these medications which have NOT CHANGED    Details   Alpha-Lipoic Acid 600 MG CAPS Take 600 mg by mouth 2 (two) times a day  , Historical Med      Apple Cider Vinegar 300 MG TABS Take 300 mg by mouth daily  , Historical Med      Ascorbic Acid (vitamin C) 1000 MG tablet Take 1,000 mg by mouth 2 (two) times a day, Historical Med      BENFOTIAMINE PO Take 300 mg by mouth 2 (two) times a day  , Historical Med      beta carotene 30 MG capsule Take 30 mg by mouth daily, Historical Med      carvedilol (COREG) 25 mg tablet Take 1 tablet (25 mg total) by mouth 2 (two) times a day with meals, Starting Mon 6/14/2021, Normal      Chromium Picolinate 500 MCG TABS Take by mouth daily , Historical Med      !! CINNAMON PO Take by mouth, Historical Med      !! [facility] CINNAMON PO Take by mouth 2 (two) times a day , Historical Med      Empagliflozin (Jardiance) 25 MG TABS Take 1 tablet (25 mg total) by mouth daily, Starting Tue 11/2/2021, Normal      Garlic 3944 MG CAPS Take by mouth 2 (two) times a day , Historical Med      lisinopril-hydrochlorothiazide (PRINZIDE,ZESTORETIC) 20-12 5 MG per tablet Take 1 tablet by mouth 2 (two) times a day, Starting Wed 7/14/2021, Normal      Magnesium ([facility]S Triple Magnesium Complex) 400 MG CAPS Take by mouth daily, Historical Med      Multiple Vitamin (MULTI VITAMIN MENS PO) Take by mouth once, Historical Med      Potassium 99 MG TABS Take by mouth daily , Historical Med      Semaglutide,0 25 or 0 5MG/DOS, (Ozempic, 0 25 or 0 5 MG/DOSE,) 2 MG/1 5ML SOPN Inject 0 25 mg under the skin once a week, Starting Tue 11/2/2021, Normal      Turmeric (QC TUMERIC COMPLEX PO) Take 1,000 mg by mouth once Tumeric /curcimin, Historical Med      VITAMIN D PO Take by mouth 2 (two) times a day, Historical Med      vitamin E, tocopherol, 400 units capsule Take 400 Units by mouth daily, Historical Med      Zinc 50 MG CAPS Take by mouth 2 (two) times a day , Historical Med      Blood Glucose Monitoring Suppl (OneTouch Verio Sync System) w/Device KIT Use daily Test sugar daily in the morning , Starting Mon 3/1/2021, Normal      Calcium Carbonate-Vit D-Min (Calcium 1200) 3669-2279 MG-UNIT CHEW Chew daily , Historical Med      diazepam (VALIUM) 5 mg tablet Take 1 tablet 30 minutes prior to MRI and may take additional tablet immediately prior to MRI as needed for anxiety, Normal      gabapentin (NEURONTIN) 300 mg capsule Take 1 capsule (300 mg total) by mouth 3 (three) times a day, Starting Tue 9/21/2021, Normal      glucose blood (OneTouch Verio) test strip Test sugar daily in the morning , Normal      Magnesium-Calcium-Folic Acid (MAGNEBIND 788 PO) Take by mouth daily , Historical Med      methocarbamol (ROBAXIN) 750 mg tablet Take 1 tablet (750 mg total) by mouth every 6 (six) hours as needed for muscle spasms, Starting Fri 3/26/2021, Normal      Misc Natural Products (Blood Sugar Balance) TABS Take by mouth 2 (two) times a day, Historical Med      naloxone (NARCAN) 4 mg/0 1 mL nasal spray Administer 1 spray into a nostril  If no response after 2-3 minutes, give another dose in the other nostril using a new spray , Normal      traMADol (ULTRAM) 50 mg tablet Take 1 tablet (50 mg total) by mouth every 12 (twelve) hours as needed for moderate pain, Starting Fri 7/23/2021, Normal       !! - Potential duplicate medications found  Please discuss with provider  No discharge procedures on file      PDMP Review       Value Time User    PDMP Reviewed  Yes 7/23/2021 11:22 AM Kaykay Hill, [facility] Provider  Electronically Signed by           Terry Hensley PA-C  02/17/22 9705

## 2022-02-17 NOTE — DISCHARGE INSTRUCTIONS
Take Doxycycline and Clindamycin as prescribed for 7 days    Return to the ED for fever, chills, worsening redness, drainage    Follow up with your PCP in 1 week for wound re-check and follow up on headaches    Continue taking Tylenol as needed for headache

## 2022-02-21 ENCOUNTER — FOLLOW UP (OUTPATIENT)
Dept: URBAN - METROPOLITAN AREA CLINIC 6 | Facility: CLINIC | Age: 57
End: 2022-02-21

## 2022-02-21 ENCOUNTER — APPOINTMENT (EMERGENCY)
Dept: CT IMAGING | Facility: HOSPITAL | Age: 57
DRG: 683 | End: 2022-02-21
Payer: MEDICARE

## 2022-02-21 ENCOUNTER — HOSPITAL ENCOUNTER (INPATIENT)
Facility: HOSPITAL | Age: 57
LOS: 1 days | Discharge: LEFT BEFORE DISCHARGE | DRG: 683 | End: 2022-02-23
Attending: EMERGENCY MEDICINE | Admitting: INTERNAL MEDICINE
Payer: MEDICARE

## 2022-02-21 DIAGNOSIS — S41.159A DOG BITE OF ARM: Primary | ICD-10-CM

## 2022-02-21 DIAGNOSIS — Z96.1: ICD-10-CM

## 2022-02-21 DIAGNOSIS — L03.113 CELLULITIS OF RIGHT UPPER EXTREMITY: ICD-10-CM
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
## 2022-02-21 DIAGNOSIS — H20.9: ICD-10-CM

## 2022-02-21 DIAGNOSIS — L03.113 CELLULITIS OF ARM, RIGHT: ICD-10-CM

## 2022-02-21 LAB
ANION GAP SERPL CALCULATED.3IONS-SCNC: 11 MMOL/L (ref 4–13)
BASOPHILS # BLD AUTO: 0.05 THOUSANDS/ΜL (ref 0–0.1)
BASOPHILS NFR BLD AUTO: 1 % (ref 0–1)
BUN SERPL-MCNC: 37 MG/DL (ref 5–25)
CALCIUM SERPL-MCNC: 8.7 MG/DL (ref 8.3–10.1)
CHLORIDE SERPL-SCNC: 102 MMOL/L (ref 100–108)
CO2 SERPL-SCNC: 26 MMOL/L (ref 21–32)
CREAT SERPL-MCNC: 1.71 MG/DL (ref 0.6–1.3)
EOSINOPHIL # BLD AUTO: 0.19 THOUSAND/ΜL (ref 0–0.61)
EOSINOPHIL NFR BLD AUTO: 3 % (ref 0–6)
ERYTHROCYTE [DISTWIDTH] IN BLOOD BY AUTOMATED COUNT: 13.2 % (ref 11.6–15.1)
GFR SERPL CREATININE-BSD FRML MDRD: 42 ML/MIN/1.73SQ M
GLUCOSE SERPL-MCNC: 142 MG/DL (ref 65–140)
HCT VFR BLD AUTO: 41 % (ref 36.5–49.3)
HGB BLD-MCNC: 13.8 G/DL (ref 12–17)
IMM GRANULOCYTES # BLD AUTO: 0.02 THOUSAND/UL (ref 0–0.2)
IMM GRANULOCYTES NFR BLD AUTO: 0 % (ref 0–2)
LYMPHOCYTES # BLD AUTO: 1.32 THOUSANDS/ΜL (ref 0.6–4.47)
LYMPHOCYTES NFR BLD AUTO: 22 % (ref 14–44)
MCH RBC QN AUTO: 31.7 PG (ref 26.8–34.3)
MCHC RBC AUTO-ENTMCNC: 33.7 G/DL (ref 31.4–37.4)
MCV RBC AUTO: 94 FL (ref 82–98)
MONOCYTES # BLD AUTO: 0.64 THOUSAND/ΜL (ref 0.17–1.22)
MONOCYTES NFR BLD AUTO: 10 % (ref 4–12)
NEUTROPHILS # BLD AUTO: 3.91 THOUSANDS/ΜL (ref 1.85–7.62)
NEUTS SEG NFR BLD AUTO: 64 % (ref 43–75)
NRBC BLD AUTO-RTO: 0 /100 WBCS
PLATELET # BLD AUTO: 267 THOUSANDS/UL (ref 149–390)
PMV BLD AUTO: 10 FL (ref 8.9–12.7)
POTASSIUM SERPL-SCNC: 5 MMOL/L (ref 3.5–5.3)
RBC # BLD AUTO: 4.35 MILLION/UL (ref 3.88–5.62)
SODIUM SERPL-SCNC: 139 MMOL/L (ref 136–145)
WBC # BLD AUTO: 6.13 THOUSAND/UL (ref 4.31–10.16)

## 2022-02-21 PROCEDURE — 80048 BASIC METABOLIC PNL TOTAL CA: CPT

## 2022-02-21 PROCEDURE — 73201 CT UPPER EXTREMITY W/DYE: CPT

## 2022-02-21 PROCEDURE — 99284 EMERGENCY DEPT VISIT MOD MDM: CPT

## 2022-02-21 PROCEDURE — 85025 COMPLETE CBC W/AUTO DIFF WBC: CPT

## 2022-02-21 PROCEDURE — G1004 CDSM NDSC: HCPCS

## 2022-02-21 PROCEDURE — 99285 EMERGENCY DEPT VISIT HI MDM: CPT

## 2022-02-21 PROCEDURE — 36415 COLL VENOUS BLD VENIPUNCTURE: CPT

## 2022-02-21 PROCEDURE — 92012 INTRM OPH EXAM EST PATIENT: CPT

## 2022-02-21 RX ORDER — MORPHINE SULFATE 4 MG/ML
4 INJECTION, SOLUTION INTRAMUSCULAR; INTRAVENOUS ONCE
Status: COMPLETED | OUTPATIENT
Start: 2022-02-21 | End: 2022-02-22

## 2022-02-21 RX ORDER — SODIUM CHLORIDE 9 MG/ML
125 INJECTION, SOLUTION INTRAVENOUS CONTINUOUS
Status: DISCONTINUED | OUTPATIENT
Start: 2022-02-21 | End: 2022-02-22

## 2022-02-21 RX ORDER — OXYCODONE HYDROCHLORIDE AND ACETAMINOPHEN 5; 325 MG/1; MG/1
1 TABLET ORAL ONCE
Status: COMPLETED | OUTPATIENT
Start: 2022-02-21 | End: 2022-02-21

## 2022-02-21 RX ADMIN — IOHEXOL 100 ML: 350 INJECTION, SOLUTION INTRAVENOUS at 21:57

## 2022-02-21 RX ADMIN — OXYCODONE HYDROCHLORIDE AND ACETAMINOPHEN 1 TABLET: 5; 325 TABLET ORAL at 20:55

## 2022-02-21 ASSESSMENT — KERATOMETRY
OS_K1POWER_DIOPTERS: 38.50
OS_AXISANGLE_DEGREES: 180
OS_AXISANGLE2_DEGREES: 90
OS_K2POWER_DIOPTERS: 38.50

## 2022-02-21 ASSESSMENT — VISUAL ACUITY
OU_SC: J1
OD_SC: 20/40+2
OS_SC: 20/20-1

## 2022-02-21 ASSESSMENT — TONOMETRY
OD_IOP_MMHG: 14
OS_IOP_MMHG: 15

## 2022-02-22 PROBLEM — N17.9 AKI (ACUTE KIDNEY INJURY) (HCC): Status: ACTIVE | Noted: 2022-02-22

## 2022-02-22 PROBLEM — L03.90 CELLULITIS: Status: ACTIVE | Noted: 2022-02-22

## 2022-02-22 LAB
ANION GAP SERPL CALCULATED.3IONS-SCNC: 9 MMOL/L (ref 4–13)
BASOPHILS # BLD AUTO: 0.04 THOUSANDS/ΜL (ref 0–0.1)
BASOPHILS NFR BLD AUTO: 1 % (ref 0–1)
BUN SERPL-MCNC: 35 MG/DL (ref 5–25)
CALCIUM SERPL-MCNC: 7.9 MG/DL (ref 8.3–10.1)
CHLORIDE SERPL-SCNC: 103 MMOL/L (ref 100–108)
CO2 SERPL-SCNC: 25 MMOL/L (ref 21–32)
CREAT SERPL-MCNC: 1.58 MG/DL (ref 0.6–1.3)
EOSINOPHIL # BLD AUTO: 0.21 THOUSAND/ΜL (ref 0–0.61)
EOSINOPHIL NFR BLD AUTO: 4 % (ref 0–6)
ERYTHROCYTE [DISTWIDTH] IN BLOOD BY AUTOMATED COUNT: 13.2 % (ref 11.6–15.1)
FLUAV RNA RESP QL NAA+PROBE: NEGATIVE
FLUBV RNA RESP QL NAA+PROBE: NEGATIVE
GFR SERPL CREATININE-BSD FRML MDRD: 47 ML/MIN/1.73SQ M
GLUCOSE SERPL-MCNC: 136 MG/DL (ref 65–140)
GLUCOSE SERPL-MCNC: 137 MG/DL (ref 65–140)
GLUCOSE SERPL-MCNC: 180 MG/DL (ref 65–140)
GLUCOSE SERPL-MCNC: 216 MG/DL (ref 65–140)
HCT VFR BLD AUTO: 36.7 % (ref 36.5–49.3)
HGB BLD-MCNC: 12.3 G/DL (ref 12–17)
IMM GRANULOCYTES # BLD AUTO: 0.01 THOUSAND/UL (ref 0–0.2)
IMM GRANULOCYTES NFR BLD AUTO: 0 % (ref 0–2)
LYMPHOCYTES # BLD AUTO: 1.69 THOUSANDS/ΜL (ref 0.6–4.47)
LYMPHOCYTES NFR BLD AUTO: 34 % (ref 14–44)
MCH RBC QN AUTO: 31.9 PG (ref 26.8–34.3)
MCHC RBC AUTO-ENTMCNC: 33.5 G/DL (ref 31.4–37.4)
MCV RBC AUTO: 95 FL (ref 82–98)
MONOCYTES # BLD AUTO: 0.6 THOUSAND/ΜL (ref 0.17–1.22)
MONOCYTES NFR BLD AUTO: 12 % (ref 4–12)
NEUTROPHILS # BLD AUTO: 2.5 THOUSANDS/ΜL (ref 1.85–7.62)
NEUTS SEG NFR BLD AUTO: 49 % (ref 43–75)
NRBC BLD AUTO-RTO: 0 /100 WBCS
PLATELET # BLD AUTO: 215 THOUSANDS/UL (ref 149–390)
PLATELET # BLD AUTO: 217 THOUSANDS/UL (ref 149–390)
PMV BLD AUTO: 9.8 FL (ref 8.9–12.7)
PMV BLD AUTO: 9.9 FL (ref 8.9–12.7)
POTASSIUM SERPL-SCNC: 4 MMOL/L (ref 3.5–5.3)
RBC # BLD AUTO: 3.86 MILLION/UL (ref 3.88–5.62)
RSV RNA RESP QL NAA+PROBE: NEGATIVE
SARS-COV-2 RNA RESP QL NAA+PROBE: NEGATIVE
SODIUM SERPL-SCNC: 137 MMOL/L (ref 136–145)
WBC # BLD AUTO: 5.05 THOUSAND/UL (ref 4.31–10.16)

## 2022-02-22 PROCEDURE — 82948 REAGENT STRIP/BLOOD GLUCOSE: CPT

## 2022-02-22 PROCEDURE — RECHECK: Performed by: INTERNAL MEDICINE

## 2022-02-22 PROCEDURE — 85025 COMPLETE CBC W/AUTO DIFF WBC: CPT | Performed by: PHYSICIAN ASSISTANT

## 2022-02-22 PROCEDURE — 36415 COLL VENOUS BLD VENIPUNCTURE: CPT | Performed by: PHYSICIAN ASSISTANT

## 2022-02-22 PROCEDURE — 80048 BASIC METABOLIC PNL TOTAL CA: CPT | Performed by: PHYSICIAN ASSISTANT

## 2022-02-22 PROCEDURE — 85049 AUTOMATED PLATELET COUNT: CPT | Performed by: PHYSICIAN ASSISTANT

## 2022-02-22 PROCEDURE — 0241U HB NFCT DS VIR RESP RNA 4 TRGT: CPT | Performed by: PHYSICIAN ASSISTANT

## 2022-02-22 PROCEDURE — 99236 HOSP IP/OBS SAME DATE HI 85: CPT | Performed by: PHYSICIAN ASSISTANT

## 2022-02-22 RX ORDER — SODIUM CHLORIDE 9 MG/ML
125 INJECTION, SOLUTION INTRAVENOUS CONTINUOUS
Status: DISCONTINUED | OUTPATIENT
Start: 2022-02-22 | End: 2022-02-22

## 2022-02-22 RX ORDER — CARVEDILOL 25 MG/1
25 TABLET ORAL 2 TIMES DAILY WITH MEALS
Status: CANCELLED | OUTPATIENT
Start: 2022-02-22

## 2022-02-22 RX ORDER — HEPARIN SODIUM 5000 [USP'U]/ML
5000 INJECTION, SOLUTION INTRAVENOUS; SUBCUTANEOUS EVERY 8 HOURS SCHEDULED
Status: CANCELLED | OUTPATIENT
Start: 2022-02-22

## 2022-02-22 RX ORDER — ACETAMINOPHEN 325 MG/1
650 TABLET ORAL EVERY 6 HOURS PRN
Status: DISCONTINUED | OUTPATIENT
Start: 2022-02-22 | End: 2022-02-23 | Stop reason: HOSPADM

## 2022-02-22 RX ORDER — OXYCODONE HYDROCHLORIDE 10 MG/1
10 TABLET ORAL EVERY 4 HOURS PRN
Status: DISCONTINUED | OUTPATIENT
Start: 2022-02-22 | End: 2022-02-23 | Stop reason: HOSPADM

## 2022-02-22 RX ORDER — CARVEDILOL 25 MG/1
25 TABLET ORAL 2 TIMES DAILY WITH MEALS
Status: DISCONTINUED | OUTPATIENT
Start: 2022-02-22 | End: 2022-02-23 | Stop reason: HOSPADM

## 2022-02-22 RX ORDER — ACETAMINOPHEN 325 MG/1
650 TABLET ORAL EVERY 6 HOURS PRN
Status: CANCELLED | OUTPATIENT
Start: 2022-02-22

## 2022-02-22 RX ORDER — ONDANSETRON 2 MG/ML
4 INJECTION INTRAMUSCULAR; INTRAVENOUS EVERY 6 HOURS PRN
Status: CANCELLED | OUTPATIENT
Start: 2022-02-22

## 2022-02-22 RX ORDER — OXYCODONE HYDROCHLORIDE 10 MG/1
10 TABLET ORAL EVERY 4 HOURS PRN
Status: CANCELLED | OUTPATIENT
Start: 2022-02-22

## 2022-02-22 RX ORDER — MORPHINE SULFATE 4 MG/ML
4 INJECTION, SOLUTION INTRAMUSCULAR; INTRAVENOUS ONCE
Status: CANCELLED | OUTPATIENT
Start: 2022-02-22 | End: 2022-02-22

## 2022-02-22 RX ORDER — HEPARIN SODIUM 5000 [USP'U]/ML
5000 INJECTION, SOLUTION INTRAVENOUS; SUBCUTANEOUS EVERY 8 HOURS SCHEDULED
Status: DISCONTINUED | OUTPATIENT
Start: 2022-02-22 | End: 2022-02-23 | Stop reason: HOSPADM

## 2022-02-22 RX ORDER — OXYCODONE HYDROCHLORIDE AND ACETAMINOPHEN 5; 325 MG/1; MG/1
1 TABLET ORAL ONCE
Status: CANCELLED | OUTPATIENT
Start: 2022-02-22 | End: 2022-02-22

## 2022-02-22 RX ORDER — ONDANSETRON 2 MG/ML
4 INJECTION INTRAMUSCULAR; INTRAVENOUS EVERY 6 HOURS PRN
Status: DISCONTINUED | OUTPATIENT
Start: 2022-02-22 | End: 2022-02-23 | Stop reason: HOSPADM

## 2022-02-22 RX ADMIN — OXYCODONE HYDROCHLORIDE 10 MG: 10 TABLET ORAL at 12:04

## 2022-02-22 RX ADMIN — ONDANSETRON 4 MG: 2 INJECTION INTRAMUSCULAR; INTRAVENOUS at 22:55

## 2022-02-22 RX ADMIN — AMPICILLIN SODIUM AND SULBACTAM SODIUM 3 G: 100; 50 INJECTION, POWDER, FOR SOLUTION INTRAMUSCULAR; INTRAVENOUS at 17:56

## 2022-02-22 RX ADMIN — CARVEDILOL 25 MG: 25 TABLET, FILM COATED ORAL at 16:37

## 2022-02-22 RX ADMIN — MORPHINE SULFATE 4 MG: 4 INJECTION INTRAVENOUS at 00:03

## 2022-02-22 RX ADMIN — OXYCODONE HYDROCHLORIDE 10 MG: 10 TABLET ORAL at 07:23

## 2022-02-22 RX ADMIN — SODIUM CHLORIDE 125 ML/HR: 0.9 INJECTION, SOLUTION INTRAVENOUS at 00:02

## 2022-02-22 RX ADMIN — OXYCODONE HYDROCHLORIDE 10 MG: 10 TABLET ORAL at 22:59

## 2022-02-22 RX ADMIN — AMPICILLIN SODIUM AND SULBACTAM SODIUM 3 G: 100; 50 INJECTION, POWDER, FOR SOLUTION INTRAMUSCULAR; INTRAVENOUS at 06:37

## 2022-02-22 RX ADMIN — MORPHINE SULFATE 2 MG: 2 INJECTION, SOLUTION INTRAMUSCULAR; INTRAVENOUS at 13:29

## 2022-02-22 RX ADMIN — SODIUM CHLORIDE 3 G: 9 INJECTION, SOLUTION INTRAVENOUS at 00:00

## 2022-02-22 RX ADMIN — AMPICILLIN SODIUM AND SULBACTAM SODIUM 3 G: 100; 50 INJECTION, POWDER, FOR SOLUTION INTRAMUSCULAR; INTRAVENOUS at 12:04

## 2022-02-22 RX ADMIN — OXYCODONE HYDROCHLORIDE 10 MG: 10 TABLET ORAL at 16:15

## 2022-02-22 NOTE — ASSESSMENT & PLAN NOTE
Due to dog bite  Was on doxycycline/clindamycin  Erythema localized to bite wound but significant local swelling  Ct RUE negative for abscess  No s/sx of sepsis or systemic illness  No hx of mrsa  Swelling/inflammation likely contributed to previously poorly controlled dm now well controlled    -continue with IV Unasyn  -serial exam of the upper extremity, CT scan without abscess, no indication for surgical intervention at this point

## 2022-02-22 NOTE — ASSESSMENT & PLAN NOTE
Due to dog bite  Was on doxycycline/clindamycin  Erythema localized to bite wound but significant local swelling  Ct RUE negative for abscess  No s/sx of sepsis or systemic illness  No hx of mrsa  Swelling/inflammation likely contributed to previously poorly controlled dm now well controlled  -observation, iv unasyn in ed will continue for now    analgesics

## 2022-02-22 NOTE — PROGRESS NOTES
Angelita 46  Progress Note [place] 1965, 59 y o  male MRN: [id number]  Unit/Bed#: SDS 01 Encounter: [id number]  Primary Care Provider: Noe Bryant MD   Date and time admitted to hospital: 2/21/2022  7:30 PM    VIVIANA (acute kidney injury) [facility])  Assessment & Plan  Baseline creatinine appears to be between 1 3 and 1 5:  Currently baseline  Slight elevation noted on admission resolved with IV fluids, no indication to continue with IV fluids at this point    Diabetes mellitus [facility])  Assessment & Plan  Lab Results   Component Value Date    HGBA1C 6 4 (H) 11/11/2021       Recent Labs     02/22/22  1057   POCGLU 216*       Blood Sugar Average: Last 72 hrs:  (P) 216presently well controlled on cinnamon, olympic and jardiance  Hold oral meds and start ssi/poc bs while ip    Essential hypertension  Assessment & Plan  Continue coreg hold lisinopril/hctz    * Cellulitis  Assessment & Plan  Due to dog bite  Was on doxycycline/clindamycin  Erythema localized to bite wound but significant local swelling  Ct RUE negative for abscess  No s/sx of sepsis or systemic illness  No hx of mrsa  Swelling/inflammation likely contributed to previously poorly controlled dm now well controlled  -continue with IV Unasyn  -serial exam of the upper extremity, CT scan without abscess, no indication for surgical intervention at this point        Patient admitted after midnight for dog bite of the right upper extremity  Is comfortable today with minimal pain today  Continues to have redness as described in previous discharge summary  Patient originally was for transfer to [address] [place] [address] given limited bed available in our facility however it was ultimately decided for the patient remains in our facility to continue medical treatment  His status has been changed to inpatient as at this time is right upper extremity has not improved enough for the patient to be switched to p o  Antibiotics    Will suggest to continue IV antibiotics for at least another 24-48 hours  No evidence of drainable collection a bedside today  If any changes on examination will consult Orthopedic surgery

## 2022-02-22 NOTE — PROGRESS NOTES
Pt admission complete  Resting in recliner  Offered hospital bed no complaints at this time  Call bell with in reach  SLIM aware pt to be seen for review of prior to admission meds  Pt willing to stay in clothes and refuses to change into hospital gown

## 2022-02-22 NOTE — H&P
[address]  H&P- Sabina Arellano 1965, 59 y o  male MRN: [id number]  Unit/Bed#: Z2H3 Encounter: [id number]  Primary Care Provider: Linda Hines MD   Date and time admitted to hospital: 2/21/2022  7:30 PM    * Cellulitis  Assessment & Plan  Due to dog bite  Was on doxycycline/clindamycin  Erythema localized to bite wound but significant local swelling  Ct RUE negative for abscess  No s/sx of sepsis or systemic illness  No hx of mrsa  Swelling/inflammation likely contributed to previously poorly controlled dm now well controlled  -observation, iv unasyn in ed will continue for now  analgesics    VIVIANA (acute kidney injury) ([facility] [address] )  Assessment & Plan  Suspected based off remote prior vs progressive disease  ivf hydration repeat bmp in am    Diabetes mellitus [facility])  Assessment & Plan  Lab Results   Component Value Date    HGBA1C 6 4 (H) 11/11/2021       No results for input(s): POCGLU in the last 72 hours  Blood Sugar Average: Last 72 hrs:  presently well controlled on cinnamon, olympic and jardiance  Hold oral meds and start ssi/poc bs while ip    Essential hypertension  Assessment & Plan  Continue coreg hold lisinopril/hctz      VTE Prophylaxis:heparin   / sequential compression device   Code Status:  fc  POLST: There is no POLST form on file for this patient (pre-hospital)  Discussion with family: wife at bedside    Anticipated Length of Stay:  Patient will be admitted on an Observation basis with an anticipated length of stay of  Less than 2 midnights  Justification for Hospital Stay: cellulitis    Total Time for Visit, including Counseling / Coordination of Care: 45 minutes  Greater than 50% of this total time spent on direct patient counseling and coordination of care      Chief Complaint:   Right arm pain x 1 week    History of Present Illness:    Sabina Arellano is a 59 y o  male who presents with PMH of non-insulin-dependent diabetes mellitus type 2, hypertension, myofascial pain and chronic pain coming hospital for right arm pain and swelling  Patient reports that she was bitten by a dog approximately 7 days prior to admission  He broke up a fight between 2-3 dogs and since stay and multiple bite wounds to the right upper extremity  He was seen in the emergency room for this on 2/16/2022  The dog was reportedly up-to-date on rabies vaccines at that time but patient was uncertain about his tetanus status and received a tetanus booster  There was prescribed clindamycin and doxycycline and was discharged home in stable condition  At that point he had mild erythema surrounding the wounds without any streaking or large area a erythema  He was discharged home but continued to have pain  Reports the redness has been relatively stable has significant swelling near a proximal bite wound  Reports that this is causing some tingling sensation in his 4th and 5th fingers  He notes no fevers chills his appetite is intact  He has no decreased range of motion in his right upper extremity  Review of Systems:    Review of Systems   Constitutional: Negative for appetite change, chills and fever  Respiratory: Negative for shortness of breath  Cardiovascular: Negative for chest pain  Gastrointestinal: Negative for abdominal pain, diarrhea, nausea and vomiting  Musculoskeletal: Positive for arthralgias  Skin: Positive for wound  Neurological: Positive for numbness (chronic numbness in 3rd/4th fingers of RUE)  Negative for light-headedness  Past Medical and Surgical History:     Past Medical History:   Diagnosis Date    High blood sugar     Hypertension        Past Surgical History:   Procedure Laterality Date    HERNIA REPAIR      KIDNEY SURGERY      MOUTH SURGERY         Meds/Allergies:    Prior to Admission medications    Medication Sig Start Date End Date Taking?  Authorizing Provider   Alpha-Lipoic Acid 600 MG CAPS Take 600 mg by mouth 2 (two) times a day     Yes Historical Provider, MD   Apple Cider Vinegar 300 MG TABS Take 300 mg by mouth daily     Yes Historical Provider, MD   Ascorbic Acid (vitamin C) 1000 MG tablet Take 1,000 mg by mouth 2 (two) times a day   Yes Historical Provider, MD   BENFOTIAMINE PO Take 300 mg by mouth 2 (two) times a day     Yes Historical Provider, MD   beta carotene 30 MG capsule Take 30 mg by mouth daily   Yes Historical Provider, MD   carvedilol (COREG) 25 mg tablet Take 1 tablet (25 mg total) by mouth 2 (two) times a day with meals 6/14/21  Yes JAN Valdivia   Chromium Picolinate 500 MCG TABS Take by mouth daily    Yes Historical Provider, MD   CINNAMON PO Take by mouth   Yes Historical Provider, MD   clindamycin (CLEOCIN) 150 mg capsule Take 3 capsules (450 mg total) by mouth every 8 (eight) hours for 7 days 2/17/22 2/24/22 Yes Javi River PA-C   [facility] CINNAMON PO Take by mouth 2 (two) times a day    Yes Historical Provider, MD   Empagliflozin (Jardiance) 25 MG TABS Take 1 tablet (25 mg total) by mouth daily 11/2/21  Yes Windy Spencer MD   Garlic 7339 MG CAPS Take by mouth 2 (two) times a day    Yes Historical Provider, MD   lisinopril-hydrochlorothiazide (PRINZIDE,ZESTORETIC) 20-12 5 MG per tablet Take 1 tablet by mouth 2 (two) times a day 7/14/21  Yes JAN Valdivia   Magnesium ([facility] Triple Magnesium Complex) 400 MG CAPS Take by mouth daily   Yes Historical Provider, MD   Misc Natural Products (Blood Sugar Balance) TABS Take by mouth 2 (two) times a day   Yes Historical Provider, MD   Multiple Vitamin (MULTI VITAMIN MENS PO) Take by mouth once   Yes Historical Provider, MD   Potassium 99 MG TABS Take by mouth daily    Yes Historical Provider, MD   Semaglutide,0 25 or 0 5MG/DOS, (Ozempic, 0 25 or 0 5 MG/DOSE,) 2 MG/1 5ML SOPN Inject 0 25 mg under the skin once a week 11/2/21  Yes Windy Spencer MD   Turmeric (QC TUMERIC COMPLEX PO) Take 1,000 mg by mouth once Tumeric /curcimin   Yes Historical Provider, MD   VITAMIN D PO Take by mouth 2 (two) times a day   Yes Historical Provider, MD   vitamin E, tocopherol, 400 units capsule Take 400 Units by mouth daily   Yes Historical Provider, MD   Zinc 50 MG CAPS Take by mouth 2 (two) times a day    Yes Historical Provider, MD   Blood Glucose Monitoring Suppl (OneTouch Verio Sync System) w/Device KIT Use daily Test sugar daily in the morning  Patient not taking: Reported on 3/8/2021 3/1/21   JAN Garcia   Calcium Carbonate-Vit D-Min (Calcium 1200) 9301-8441 MG-UNIT CHEW Chew daily   Patient not taking: Reported on 2/17/2022     Historical Provider, MD   diazepam (VALIUM) 5 mg tablet Take 1 tablet 30 minutes prior to MRI and may take additional tablet immediately prior to MRI as needed for anxiety  Patient not taking: Reported on 5/4/2021 3/26/21   Denys Elizondo DO   doxycycline hyclate (VIBRAMYCIN) 100 mg capsule Take 1 capsule (100 mg total) by mouth every 12 (twelve) hours for 7 days 2/17/22 2/24/22  Steve Miller PA-C   gabapentin (NEURONTIN) 300 mg capsule Take 1 capsule (300 mg total) by mouth 3 (three) times a day  Patient not taking: Reported on 2/17/2022 9/21/21   JAN Garcia   glucose blood (OneTouch Verio) test strip Test sugar daily in the morning  Patient not taking: Reported on 3/8/2021 3/1/21   JAN Garcia   Magnesium-Calcium-Folic Acid (MAGNEBIND 815 PO) Take by mouth daily   Patient not taking: Reported on 2/17/2022     Historical Provider, MD   methocarbamol (ROBAXIN) 750 mg tablet Take 1 tablet (750 mg total) by mouth every 6 (six) hours as needed for muscle spasms  Patient not taking: Reported on 5/4/2021 3/26/21   Denys Elizondo DO   naloxone [facility]) 4 mg/0 1 mL nasal spray Administer 1 spray into a nostril  If no response after 2-3 minutes, give another dose in the other nostril using a new spray    Patient not taking: Reported on 6/17/2021 5/19/21   JAN Garcia   traMADol (ULTRAM) 50 mg tablet Take 1 tablet (50 mg total) by mouth every 12 (twelve) hours as needed for moderate pain  Patient not taking: Reported on 2/17/2022 7/23/21   JAN Moore     I have reviewed home medications with patient personally  Allergies: Allergies   Allergen Reactions    Cephalexin Hives    Metformin GI Intolerance       Social History:     Marital Status:    Occupation:   Patient Pre-hospital Living Situation:   Patient Pre-hospital Level of Mobility:   Patient Pre-hospital Diet Restrictions:   Substance Use History:   Social History     Substance and Sexual Activity   Alcohol Use Yes    Comment: ocassional     Social History     Tobacco Use   Smoking Status Never Smoker   Smokeless Tobacco Never Used     Social History     Substance and Sexual Activity   Drug Use Never       Family History:    No family history on file  Physical Exam:     Vitals:   Blood Pressure: 114/82 (02/21/22 2138)  Pulse: 91 (02/21/22 2138)  Temperature: 98 3 °F (36 8 °C) (02/21/22 1918)  Temp Source: Oral (02/21/22 1918)  Respirations: 20 (02/21/22 2138)  Weight - Scale: 120 kg (263 lb 10 7 oz) (02/21/22 1918)  SpO2: 96 % (02/21/22 2138)    Physical Exam  Vitals reviewed  Constitutional:       Appearance: He is obese  He is not ill-appearing, toxic-appearing or diaphoretic  Comments: Conversational   pleasant   HENT:      Right Ear: External ear normal       Left Ear: External ear normal       Nose: Nose normal    Eyes:      Extraocular Movements: Extraocular movements intact  Cardiovascular:      Rate and Rhythm: Normal rate and regular rhythm  Heart sounds: No murmur heard  No friction rub  No gallop  Pulmonary:      Effort: No respiratory distress  Breath sounds: No wheezing, rhonchi or rales  Abdominal:      General: There is no distension  Palpations: Abdomen is soft  There is no mass  Tenderness: There is no abdominal tenderness  There is no guarding or rebound  Hernia: No hernia is present     Musculoskeletal:      Cervical back: Normal range of motion  Comments:  strength in UE equal b/l   Skin:     General: Skin is warm and dry  Findings: Erythema (minimal erythema around proximal healing wound on right volar UE   significant soft tissue swelling and ecchymosis noted however) present  Neurological:      Mental Status: He is alert  Mental status is at baseline  Comments: Crude sensation in UE intact b/l but noted 4/5 fingers RUE feel 'weird '   Psychiatric:         Mood and Affect: Mood normal          (  Be Sure to Include Physical Exam: Delete this entire line when you have entered your exam)    Additional Data:     Lab Results: I have personally reviewed pertinent reports  Results from last 7 days   Lab Units 02/21/22  2101   WBC Thousand/uL 6 13   HEMOGLOBIN g/dL 13 8   HEMATOCRIT % 41 0   PLATELETS Thousands/uL 267   NEUTROS PCT % 64   LYMPHS PCT % 22   MONOS PCT % 10   EOS PCT % 3     Results from last 7 days   Lab Units 02/21/22  2101   SODIUM mmol/L 139   POTASSIUM mmol/L 5 0   CHLORIDE mmol/L 102   CO2 mmol/L 26   BUN mg/dL 37*   CREATININE mg/dL 1 71*   ANION GAP mmol/L 11   CALCIUM mg/dL 8 7   GLUCOSE RANDOM mg/dL 142*                       Imaging: I have personally reviewed pertinent reports  CT upper extremity w contrast right   Final Result by Stephanie Warner MD (02/21 2221)      Skin thickening and subcutaneous stranding overlying the mid forearm which may be due to cellulitis  No significant fluid collection to suggest drainable abscess  Workstation performed: [id number]             EKG, Pathology, and Other Studies Reviewed on Admission:   · EKG:     Allscripts / Epic Records Reviewed: Yes     ** Please Note: This note has been constructed using a voice recognition system   **

## 2022-02-22 NOTE — ED NOTES
RN asked pt to change in to hospital gown   Pt preferred to stay in street clothes     Miroslava Mccoy RN  02/22/22 [facility]  02/22/22 7693

## 2022-02-22 NOTE — ASSESSMENT & PLAN NOTE
Lab Results   Component Value Date    HGBA1C 6 4 (H) 11/11/2021       No results for input(s): POCGLU in the last 72 hours      Blood Sugar Average: Last 72 hrs:  presently well controlled on cinnamon, olympic and jardiance  Hold oral meds and start ssi/poc bs while ip

## 2022-02-22 NOTE — ASSESSMENT & PLAN NOTE
Baseline creatinine appears to be between 1 3 and 1 5:  Currently baseline  Slight elevation noted on admission resolved with IV fluids, no indication to continue with IV fluids at this point
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
## 2022-02-22 NOTE — ED PROVIDER NOTES
History  Chief Complaint   Patient presents with    Dog Bite     pt arrives for a re-evaluation for a dog bite that happened last tuesday  pt was seen and recieved abx  pts dog was UTD on vax  the dog bite son on R forearm is swollen, warm and tender     Patient is a 20-year-old male with past medical history significant for HTN and DM presenting to the ED with a complaint of increasing swelling, erythema, warmth and tenderness to the R forearm  Pt reports he was seen in the ED 1 week ago for a dog bite sustained to the R forearm by his own dog  Reports dog was UTD on vaccination status including rabies  Reports the area has been healing well with no complaints until 1 day ago when the pt reports he began having a color change, increased redness, swelling, warmth, and pain over one of the dog bite sites  States he has been taking the abx as prescribed  Currently rates the pain at an 8/10 and reports taking tylenol without relief of the pain  Denies purulent discharge, erythematous streaking up the arm, fever, chills, CP, SOB, N/V/D, abdominal pain, and any other complaint  Pt was given tetanus vaccine 1 week ago  Prior to Admission Medications   Prescriptions Last Dose Informant Patient Reported? Taking? Alpha-Lipoic Acid 600 MG CAPS   Yes Yes   Sig: Take 600 mg by mouth 2 (two) times a day     Apple Cider Vinegar 300 MG TABS   Yes Yes   Sig: Take 300 mg by mouth daily     Ascorbic Acid (vitamin C) 1000 MG tablet   Yes Yes   Sig: Take 1,000 mg by mouth 2 (two) times a day   BENFOTIAMINE PO   Yes Yes   Sig: Take 300 mg by mouth 2 (two) times a day     Blood Glucose Monitoring Suppl (OneTouch Verio Sync System) w/Device KIT Not Taking at Unknown time Self No No   Sig: Use daily Test sugar daily in the morning     Patient not taking: Reported on 3/8/2021   CINNAMON PO   Yes Yes   Sig: Take by mouth   [facility] CINNAMON PO  Self Yes Yes   Sig: Take by mouth 2 (two) times a day    Calcium Carbonate-Vit D-Min (Calcium 1200) 0689-6887 MG-UNIT CHEW Not Taking at Unknown time Self Yes No   Sig: Chew daily    Patient not taking: Reported on 2/17/2022    Chromium Picolinate 500 MCG TABS  Self Yes Yes   Sig: Take by mouth daily    Empagliflozin (Jardiance) 25 MG TABS   No Yes   Sig: Take 1 tablet (25 mg total) by mouth daily   Garlic 4234 MG CAPS  Self Yes Yes   Sig: Take by mouth 2 (two) times a day    Magnesium ([facility] Triple Magnesium Complex) 400 MG CAPS   Yes Yes   Sig: Take by mouth daily   Magnesium-Calcium-Folic Acid (MAGNEBIND 236 PO) Not Taking at Unknown time Self Yes No   Sig: Take by mouth daily    Patient not taking: Reported on 2/17/2022    Misc Natural Products (Blood Sugar Balance) TABS   Yes Yes   Sig: Take by mouth 2 (two) times a day   Multiple Vitamin (MULTI VITAMIN MENS PO)   Yes Yes   Sig: Take by mouth once   Potassium 99 MG TABS  Self Yes Yes   Sig: Take by mouth daily    Semaglutide,0 25 or 0 5MG/DOS, (Ozempic, 0 25 or 0 5 MG/DOSE,) 2 MG/1 5ML SOPN   No Yes   Sig: Inject 0 25 mg under the skin once a week   Turmeric (QC TUMERIC COMPLEX PO)   Yes Yes   Sig: Take 1,000 mg by mouth once Tumeric /curcimin   VITAMIN D PO   Yes Yes   Sig: Take by mouth 2 (two) times a day   Zinc 50 MG CAPS  Self Yes Yes   Sig: Take by mouth 2 (two) times a day    beta carotene 30 MG capsule  Self Yes Yes   Sig: Take 30 mg by mouth daily   carvedilol (COREG) 25 mg tablet   No Yes   Sig: Take 1 tablet (25 mg total) by mouth 2 (two) times a day with meals   clindamycin (CLEOCIN) 150 mg capsule   No Yes   Sig: Take 3 capsules (450 mg total) by mouth every 8 (eight) hours for 7 days   diazepam (VALIUM) 5 mg tablet Not Taking at Unknown time  No No   Sig: Take 1 tablet 30 minutes prior to MRI and may take additional tablet immediately prior to MRI as needed for anxiety   Patient not taking: Reported on 5/4/2021   doxycycline hyclate (VIBRAMYCIN) 100 mg capsule   No No   Sig: Take 1 capsule (100 mg total) by mouth every 12 (twelve) hours for 7 days   gabapentin (NEURONTIN) 300 mg capsule Not Taking at Unknown time  No No   Sig: Take 1 capsule (300 mg total) by mouth 3 (three) times a day   Patient not taking: Reported on 2/17/2022    glucose blood (OneTouch Verio) test strip Not Taking at Unknown time Self No No   Sig: Test sugar daily in the morning  Patient not taking: Reported on 3/8/2021   lisinopril-hydrochlorothiazide (PRINZIDE,ZESTORETIC) 20-12 5 MG per tablet   No Yes   Sig: Take 1 tablet by mouth 2 (two) times a day   methocarbamol (ROBAXIN) 750 mg tablet Not Taking at Unknown time  No No   Sig: Take 1 tablet (750 mg total) by mouth every 6 (six) hours as needed for muscle spasms   Patient not taking: Reported on 5/4/2021   naloxone (NARCAN) 4 mg/0 1 mL nasal spray Not Taking at Unknown time  No No   Sig: Administer 1 spray into a nostril  If no response after 2-3 minutes, give another dose in the other nostril using a new spray  Patient not taking: Reported on 6/17/2021   traMADol (ULTRAM) 50 mg tablet Not Taking at Unknown time  No No   Sig: Take 1 tablet (50 mg total) by mouth every 12 (twelve) hours as needed for moderate pain   Patient not taking: Reported on 2/17/2022    vitamin E, tocopherol, 400 units capsule  Self Yes Yes   Sig: Take 400 Units by mouth daily      Facility-Administered Medications: None       Past Medical History:   Diagnosis Date    High blood sugar     Hypertension        Past Surgical History:   Procedure Laterality Date    HERNIA REPAIR      KIDNEY SURGERY      MOUTH SURGERY         No family history on file  I have reviewed and agree with the history as documented      E-Cigarette/Vaping    E-Cigarette Use Never User      E-Cigarette/Vaping Substances    Nicotine No     THC No     CBD No     Flavoring No     Other No     Unknown No      Social History     Tobacco Use    Smoking status: Never Smoker    Smokeless tobacco: Never Used   Vaping Use    Vaping Use: Never used   Substance Use Topics    Alcohol use: Yes     Comment: ocassional    Drug use: Never       Review of Systems   Constitutional: Negative for chills and fever  HENT: Negative for ear pain and sore throat  Eyes: Negative for pain and visual disturbance  Respiratory: Negative for cough and shortness of breath  Cardiovascular: Negative for chest pain and palpitations  Gastrointestinal: Negative for abdominal pain and vomiting  Genitourinary: Negative for dysuria and hematuria  Musculoskeletal: Negative for arthralgias, back pain and neck pain  Skin: Positive for color change and wound  Negative for rash  (+) erythema, swelling, warmth to the R forearm over dog bite site  Neurological: Negative for seizures, syncope, weakness and light-headedness  Hematological: Does not bruise/bleed easily  Psychiatric/Behavioral: Negative for confusion  All other systems reviewed and are negative  Physical Exam  Physical Exam  Vitals and nursing note reviewed  Constitutional:       General: He is awake  He is not in acute distress  Appearance: Normal appearance  He is well-developed  He is not ill-appearing  HENT:      Head: Normocephalic and atraumatic  Eyes:      Conjunctiva/sclera: Conjunctivae normal    Cardiovascular:      Rate and Rhythm: Normal rate and regular rhythm  Heart sounds: No murmur heard  Pulmonary:      Effort: Pulmonary effort is normal  No respiratory distress  Breath sounds: Normal breath sounds  Abdominal:      Palpations: Abdomen is soft  Tenderness: There is no abdominal tenderness  Musculoskeletal:      Right shoulder: Normal       Right upper arm: Normal       Right elbow: Normal       Right forearm: Swelling, laceration and tenderness present  Right wrist: Normal       Right hand: Normal strength  Normal sensation  Normal capillary refill  Cervical back: Neck supple        Comments: Erythema, mild swelling, tenderness and induration to palpation on the R forearm around largest bite site  No fluctuant mass appreciated  No purulent discharge or streaking  Full, painless ROM of R elbow and R wrist intact  Skin:     General: Skin is warm and dry  Capillary Refill: Capillary refill takes less than 2 seconds  Neurological:      General: No focal deficit present  Mental Status: He is alert  Comments: Sensation intact in RUE  Strength 5/5 in RUE  Psychiatric:         Mood and Affect: Mood normal          Behavior: Behavior normal  Behavior is cooperative           Vital Signs  ED Triage Vitals   Temperature Pulse Respirations Blood Pressure SpO2   02/21/22 1918 02/21/22 1918 02/21/22 1918 02/21/22 1918 02/21/22 1918   98 3 °F (36 8 °C) 80 18 121/78 96 %      Temp Source Heart Rate Source Patient Position - Orthostatic VS BP Location FiO2 (%)   02/21/22 1918 02/21/22 1918 02/21/22 1918 02/21/22 1918 --   Oral Monitor Sitting Left arm       Pain Score       02/21/22 2055       9           Vitals:    02/21/22 1918 02/21/22 2138   BP: 121/78 114/82   Pulse: 80 91   Patient Position - Orthostatic VS: Sitting Sitting         Visual Acuity      ED Medications  Medications   ampicillin-sulbactam (UNASYN) 3 g in sodium chloride 0 9 % 100 mL IVPB (has no administration in time range)   sodium chloride 0 9 % infusion (has no administration in time range)   morphine (PF) 4 mg/mL injection 4 mg (has no administration in time range)   oxyCODONE-acetaminophen (PERCOCET) 5-325 mg per tablet 1 tablet (1 tablet Oral Given 2/21/22 2055)   iohexol (OMNIPAQUE) 350 MG/ML injection (SINGLE-DOSE) 100 mL (100 mL Intravenous Given 2/21/22 2157)       Diagnostic Studies  Results Reviewed     Procedure Component Value Units Date/Time    Basic metabolic panel [[id number]]  (Abnormal) Collected: 02/21/22 2101    Lab Status: Final result Specimen: Blood from Arm, Left Updated: 02/21/22 2121     Sodium 139 mmol/L      Potassium 5 0 mmol/L      Chloride 102 mmol/L CO2 26 mmol/L      ANION GAP 11 mmol/L      BUN 37 mg/dL      Creatinine 1 71 mg/dL      Glucose 142 mg/dL      Calcium 8 7 mg/dL      eGFR 42 ml/min/1 73sq m     Narrative:      [place] guidelines for Chronic Kidney Disease (CKD):     Stage 1 with normal or high GFR (GFR > 90 mL/min/1 73 square meters)    Stage 2 Mild CKD (GFR = 60-89 mL/min/1 73 square meters)    Stage 3A Moderate CKD (GFR = 45-59 mL/min/1 73 square meters)    Stage 3B Moderate CKD (GFR = 30-44 mL/min/1 73 square meters)    Stage 4 Severe CKD (GFR = 15-29 mL/min/1 73 square meters)    Stage 5 End Stage CKD (GFR <15 mL/min/1 73 square meters)  Note: GFR calculation is accurate only with a steady state creatinine    CBC and differential [[id number]] Collected: 02/21/22 2101    Lab Status: Final result Specimen: Blood from Arm, Left Updated: 02/21/22 2112     WBC 6 13 Thousand/uL      RBC 4 35 Million/uL      Hemoglobin 13 8 g/dL      Hematocrit 41 0 %      MCV 94 fL      MCH 31 7 pg      MCHC 33 7 g/dL      RDW 13 2 %      MPV 10 0 fL      Platelets 596 Thousands/uL      nRBC 0 /100 WBCs      Neutrophils Relative 64 %      Immat GRANS % 0 %      Lymphocytes Relative 22 %      Monocytes Relative 10 %      Eosinophils Relative 3 %      Basophils Relative 1 %      Neutrophils Absolute 3 91 Thousands/µL      Immature Grans Absolute 0 02 Thousand/uL      Lymphocytes Absolute 1 32 Thousands/µL      Monocytes Absolute 0 64 Thousand/µL      Eosinophils Absolute 0 19 Thousand/µL      Basophils Absolute 0 05 Thousands/µL                  CT upper extremity w contrast right   Final Result by Jt Winter MD (02/21 2221)      Skin thickening and subcutaneous stranding overlying the mid forearm which may be due to cellulitis  No significant fluid collection to suggest drainable abscess           Workstation performed: [id number]                    Procedures  Procedures         ED Course MDM  Number of Diagnoses or Management Options     Amount and/or Complexity of Data Reviewed  Clinical lab tests: ordered and reviewed  Tests in the radiology section of CPT®: ordered and reviewed    Risk of Complications, Morbidity, and/or Mortality  General comments: Pt presenting to the ED for  increasing erythema, warmth, swelling and pain to the right forearm after a dog bite 1 week ago  Pt has been taking abx as prescribed  In light of increasing erythema, warmth, swelling and pain will order CT of RUE to further evaluate for possible abscess and retained foreign body  Will check basic labs and pain control  VSS-afebrile  Physical exam shows mild swelling, erythema and warmth to the largest bite site  No fluctuant mass or purulent discharge appreciated  No proximal streaking appreciated  No other significant findings on exam   CT IMPRESSION:  Skin thickening and subcutaneous stranding overlying the mid forearm which may be due to cellulitis  No significant fluid collection to suggest drainable abscess  IV abx ordered and contacted [facility] for admission for failure of outpt abx  [facility] accepted pt as observation  Verbally communicated all above findings and disposition with the patient who verbalized his understanding and agreement to the above plan  All patient questions and concerns were answered  Patient stable at admission  Pt should hold metformin for 48 hours 2/2 to IV contrast      Patient Progress  Patient progress: stable      Disposition  Final diagnoses:   Dog bite of arm   Cellulitis of arm, right     Time reflects when diagnosis was documented in both MDM as applicable and the Disposition within this note     Time User Action Codes Description Comment    2/21/2022 11:27 PM Lashae Estimable Add Beny Alcantara  0XXA] Dog bite of arm     2/21/2022 11:27 PM Gloria Brown Add [U01 [id number]] Cellulitis of arm, right       ED Disposition     ED Disposition Condition Date/Time Comment    Admit Stable Mon Feb 21, 2022 11:27 PM Case was discussed with LYLY and the patient's admission status was agreed to be Admission Status: observation status to the service of Dr Drew Wright   Follow-up Information    None         Patient's Medications   Discharge Prescriptions    No medications on file       No discharge procedures on file      PDMP Review       Value Time User    PDMP Reviewed  Yes 7/23/2021 11:22 AM Izzy Bagley, [facility]          ED Provider  Electronically Signed by           Kelsey Lobato PA-C  02/21/22 5697

## 2022-02-22 NOTE — ASSESSMENT & PLAN NOTE
Lab Results   Component Value Date    HGBA1C 6 4 (H) 11/11/2021       Recent Labs     02/22/22  1057   POCGLU 216*       Blood Sugar Average: Last 72 hrs:  (P) 216presently well controlled on cinnamon, olympic and jardiance  Hold oral meds and start ssi/poc bs while ip

## 2022-02-22 NOTE — ED NOTES
Pt pulled RN aside stating "As soon as I got off the CT table after the contrast, I was really nauseous, can you just make a note of that" RN offered to ask provider for something for nausea, pt john Aguilary, RN  02/21/22 9292

## 2022-02-23 VITALS
TEMPERATURE: 98.4 F | HEIGHT: 71 IN | HEART RATE: 86 BPM | WEIGHT: 263.67 LBS | SYSTOLIC BLOOD PRESSURE: 149 MMHG | RESPIRATION RATE: 18 BRPM | DIASTOLIC BLOOD PRESSURE: 93 MMHG | BODY MASS INDEX: 36.91 KG/M2 | OXYGEN SATURATION: 94 %

## 2022-02-23 PROBLEM — S41.151S DOG BITE OF ARM, RIGHT, SEQUELA: Status: ACTIVE | Noted: 2022-02-23
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
## 2022-02-23 PROBLEM — N28.9 ACUTE RENAL INSUFFICIENCY: Status: ACTIVE | Noted: 2022-02-22

## 2022-02-23 LAB
GLUCOSE SERPL-MCNC: 116 MG/DL (ref 65–140)
GLUCOSE SERPL-MCNC: 119 MG/DL (ref 65–140)
GLUCOSE SERPL-MCNC: 215 MG/DL (ref 65–140)

## 2022-02-23 PROCEDURE — 82948 REAGENT STRIP/BLOOD GLUCOSE: CPT

## 2022-02-23 PROCEDURE — 99232 SBSQ HOSP IP/OBS MODERATE 35: CPT | Performed by: INTERNAL MEDICINE

## 2022-02-23 PROCEDURE — 99221 1ST HOSP IP/OBS SF/LOW 40: CPT | Performed by: SURGERY

## 2022-02-23 RX ADMIN — ACETAMINOPHEN 650 MG: 325 TABLET, FILM COATED ORAL at 07:37

## 2022-02-23 RX ADMIN — AMPICILLIN SODIUM AND SULBACTAM SODIUM 3 G: 100; 50 INJECTION, POWDER, FOR SOLUTION INTRAMUSCULAR; INTRAVENOUS at 06:02

## 2022-02-23 RX ADMIN — CARVEDILOL 25 MG: 25 TABLET, FILM COATED ORAL at 17:31

## 2022-02-23 RX ADMIN — ONDANSETRON 4 MG: 2 INJECTION INTRAMUSCULAR; INTRAVENOUS at 06:13

## 2022-02-23 RX ADMIN — OXYCODONE HYDROCHLORIDE 10 MG: 10 TABLET ORAL at 06:12

## 2022-02-23 RX ADMIN — AMPICILLIN SODIUM AND SULBACTAM SODIUM 3 G: 100; 50 INJECTION, POWDER, FOR SOLUTION INTRAMUSCULAR; INTRAVENOUS at 12:01

## 2022-02-23 RX ADMIN — CARVEDILOL 25 MG: 25 TABLET, FILM COATED ORAL at 07:35

## 2022-02-23 RX ADMIN — AMPICILLIN SODIUM AND SULBACTAM SODIUM 3 G: 100; 50 INJECTION, POWDER, FOR SOLUTION INTRAMUSCULAR; INTRAVENOUS at 00:45

## 2022-02-23 NOTE — QUICK NOTE
Patient decided to leave against medical advice  He was angry that he was not receiving Jardiance and lisinopril  I tried to explain to him the rationale for holding these medications (elevated creatinine, VIVIANA and contrast exposure)  He said these were prescribed by his specialist who knows better than me  He did not want to listen and told me that I am "pissing him off"  He said he is leaving and will go to [facility] tomorrow  I told him about the risk of worsening infection, permanent tissue damage, amputation, sepsis, death  He acknowledged these risks and still signed out against medical advice  His wife was at the bedside and supported his decision

## 2022-02-23 NOTE — PLAN OF CARE
Problem: PAIN - ADULT  Goal: Verbalizes/displays adequate comfort level or baseline comfort level  Description: Interventions:  - Encourage patient to monitor pain and request assistance  - Assess pain using appropriate pain scale  - Administer analgesics based on type and severity of pain and evaluate response  - Implement non-pharmacological measures as appropriate and evaluate response  - Consider cultural and social influences on pain and pain management  - Notify physician/advanced practitioner if interventions unsuccessful or patient reports new pain  Outcome: Progressing     Problem: INFECTION - ADULT  Goal: Absence or prevention of progression during hospitalization  Description: INTERVENTIONS:  - Assess and monitor for signs and symptoms of infection  - Monitor lab/diagnostic results  - Monitor all insertion sites, i e  indwelling lines, tubes, and drains  - Monitor endotracheal if appropriate and nasal secretions for changes in amount and color  - [place] appropriate cooling/warming therapies per order  - Administer medications as ordered  - Instruct and encourage patient and family to use good hand hygiene technique  - Identify and instruct in appropriate isolation precautions for identified infection/condition  Outcome: Progressing  Goal: Absence of fever/infection during neutropenic period  Description: INTERVENTIONS:  - Monitor WBC    Outcome: Progressing     Problem: SAFETY ADULT  Goal: Patient will remain free of falls  Description: INTERVENTIONS:  - Educate patient/family on patient safety including physical limitations  - Instruct patient to call for assistance with activity   - Consult OT/PT to assist with strengthening/mobility   - Keep Call bell within reach  - Keep bed low and locked with side rails adjusted as appropriate  - Keep care items and personal belongings within reach  - Initiate and maintain comfort rounds  - Make Fall Risk Sign visible to staff  - Apply yellow socks and bracelet for high fall risk patients  - Consider moving patient to room near nurses station  Outcome: Progressing  Goal: Maintain or return to baseline ADL function  Description: INTERVENTIONS:  -  Assess patient's ability to carry out ADLs; assess patient's baseline for ADL function and identify physical deficits which impact ability to perform ADLs (bathing, care of mouth/teeth, toileting, grooming, dressing, etc )  - Assess/evaluate cause of self-care deficits   - Assess range of motion  - Assess patient's mobility; develop plan if impaired  - Assess patient's need for assistive devices and provide as appropriate  - Encourage maximum independence but intervene and supervise when necessary  - Involve family in performance of ADLs  - Assess for home care needs following discharge   - Consider OT consult to assist with ADL evaluation and planning for discharge  - Provide patient education as appropriate  Outcome: Progressing  Goal: Maintains/Returns to pre admission functional level  Description: INTERVENTIONS:  - Perform BMAT or MOVE assessment daily    - Set and communicate daily mobility goal to care team and patient/family/caregiver     - Collaborate with rehabilitation services on mobility goals if consulted  - Out of bed for toileting  - Record patient progress and toleration of activity level   Outcome: Progressing     Problem: DISCHARGE PLANNING  Goal: Discharge to home or other facility with appropriate resources  Description: INTERVENTIONS:  - Identify barriers to discharge w/patient and caregiver  - Arrange for needed discharge resources and transportation as appropriate  - Identify discharge learning needs (meds, wound care, etc )  - Arrange for interpretive services to assist at discharge as needed  - Refer to Case Management Department for coordinating discharge planning if the patient needs post-hospital services based on physician/advanced practitioner order or complex needs related to functional status, cognitive ability, or social support system  Outcome: Progressing     Problem: Knowledge Deficit  Goal: Patient/family/caregiver demonstrates understanding of disease process, treatment plan, medications, and discharge instructions  Description: Complete learning assessment and assess knowledge base  Interventions:  - Provide teaching at level of understanding  - Provide teaching via preferred learning methods  Outcome: Progressing     Problem: MOBILITY - ADULT  Goal: Maintain or return to baseline ADL function  Description: INTERVENTIONS:  -  Assess patient's ability to carry out ADLs; assess patient's baseline for ADL function and identify physical deficits which impact ability to perform ADLs (bathing, care of mouth/teeth, toileting, grooming, dressing, etc )  - Assess/evaluate cause of self-care deficits   - Assess range of motion  - Assess patient's mobility; develop plan if impaired  - Assess patient's need for assistive devices and provide as appropriate  - Encourage maximum independence but intervene and supervise when necessary  - Involve family in performance of ADLs  - Assess for home care needs following discharge   - Consider OT consult to assist with ADL evaluation and planning for discharge  - Provide patient education as appropriate  Outcome: Progressing  Goal: Maintains/Returns to pre admission functional level  Description: INTERVENTIONS:  - Perform BMAT or MOVE assessment daily    - Set and communicate daily mobility goal to care team and patient/family/caregiver     - Collaborate with rehabilitation services on mobility goals if consulted  - Out of bed for toileting  - Record patient progress and toleration of activity level   Outcome: Progressing

## 2022-02-23 NOTE — ASSESSMENT & PLAN NOTE
Dog bite with cellulitis of the right forearm  Failed outpatient treatment with doxycycline/clindamycin  He was initiated on Unasyn 3 g q 6    Cautious pain control was administered

## 2022-02-23 NOTE — ASSESSMENT & PLAN NOTE
· Continues to have persistent pain and palpable area around the site of the bite concerning for developing abscess  General surgery was consulted and plan is for ongoing observation since there is no drainable abscess noted on exam at this time  CT showed no drainable abscess

## 2022-02-23 NOTE — ASSESSMENT & PLAN NOTE
· Continues to have persistent pain and palpable area around the site of the bite concerning for developing abscess  · CT shows no abscess but exam and persistent pain suggest otherwise  · Consult general surgery  · Continue Unasyn 3 g q 6h  · Continue pain control

## 2022-02-23 NOTE — PROGRESS NOTES
[address] [place] [address]  Progress Note Kwaku Stands 1965, 59 y o  male MRN: [id number]  Unit/Bed#: SDS 01 Encounter: [id number]  Primary Care Provider: Carlos Salcedo MD   Date and time admitted to hospital: 2/21/2022  7:30 PM    * Cellulitis  Assessment & Plan  Dog bite with cellulitis of the right forearm  Failed outpatient treatment with doxycycline/clindamycin  Continue Unasyn currently on day to 3 g q 6  Cautious pain control     Dog bite of arm, right, sequela  Assessment & Plan  · Continues to have persistent pain and palpable area around the site of the bite concerning for developing abscess  · CT shows no abscess but exam and persistent pain suggest otherwise  · Consult general surgery  · Continue Unasyn 3 g q 6h  · Continue pain control    VIVIANA (acute kidney injury) ([facility] [address] )  Assessment & Plan  Acute kidney with baseline creatinine 1 3-1 5  Improved with IV fluids and at baseline  Repeat BMP in a m  Diabetes mellitus [facility])  Assessment & Plan  Lab Results   Component Value Date    HGBA1C 6 4 (H) 11/11/2021       Recent Labs     02/22/22  1057 02/22/22  1620 02/22/22  2117 02/23/22  0608   POCGLU 216* 137 180* 116     A1c is at goal  Mild hyperglycemia due to acute infection  Jardiance is on hold  Continue sliding scale insulin    Essential hypertension  Assessment & Plan  Continue coreg 25 mg b i d  Hold lisinopril HCTZ due to recent VIVIANA      VTE Pharmacologic Prophylaxis:   Pharmacologic: Heparin  Mechanical VTE Prophylaxis in Place: Yes    Patient Centered Rounds: I have performed bedside rounds with nursing staff today  Discussions with Specialists or Other Care Team Provider:  Hospital course reviewed    Education and Discussions with Family / Patient:  Offered to call his wife, he declined    Time Spent for Care: 20 minutes  More than 50% of total time spent on counseling and coordination of care as described above      Current Length of Stay: 1 day(s)    Current Patient Status: Inpatient   Certification Statement: The patient will continue to require additional inpatient hospital stay due to Right forearm cellulitis and possible developing abscess    Discharge Plan:  Not ready for DC today    Code Status: Level 1 - Full Code      Subjective:   Still complains of 7/10 right forearm pain  "Not getting better"  He feels like antibiotics are not working  He has needed oxycodone 4 times yesterday and once this morning    Objective:     Vitals:   Temp (24hrs), Av 9 °F (36 6 °C), Min:97 6 °F (36 4 °C), Max:98 3 °F (36 8 °C)    Temp:  [97 6 °F (36 4 °C)-98 3 °F (36 8 °C)] 98 3 °F (36 8 °C)  HR:  [72-79] 79  Resp:  [16-20] 18  BP: (125-144)/(68-86) 134/86  SpO2:  [95 %-98 %] 97 %  Body mass index is 36 77 kg/m²  Input and Output Summary (last 24 hours): Intake/Output Summary (Last 24 hours) at 2022 0913  Last data filed at 2022 1204  Gross per 24 hour   Intake 400 ml   Output 1 ml   Net 399 ml       Physical Exam:     Physical Exam  Vitals reviewed  Constitutional:       Appearance: He is obese  He is not ill-appearing or diaphoretic  HENT:      Head: Normocephalic and atraumatic  Nose: No rhinorrhea  Eyes:      General: No scleral icterus  Cardiovascular:      Rate and Rhythm: Regular rhythm  Heart sounds: No murmur heard  No gallop  Pulmonary:      Effort: Pulmonary effort is normal  No respiratory distress  Breath sounds: No wheezing or rales  Abdominal:      Palpations: Abdomen is soft  Comments: Large protuberant   Musculoskeletal:        Arms:       Cervical back: Neck supple  Skin:     Comments: Scattered abrasions of the right forearm   Neurological:      Mental Status: He is oriented to person, place, and time     Psychiatric:         Mood and Affect: Mood normal          Behavior: Behavior normal        Additional Data:     Labs:    Results from last 7 days   Lab Units 22  0628   WBC Thousand/uL 5 05   HEMOGLOBIN g/dL 12 3 HEMATOCRIT % 36 7   PLATELETS Thousands/uL 215   NEUTROS PCT % 49   LYMPHS PCT % 34   MONOS PCT % 12   EOS PCT % 4     Results from last 7 days   Lab Units 02/22/22  0628   SODIUM mmol/L 137   POTASSIUM mmol/L 4 0   CHLORIDE mmol/L 103   CO2 mmol/L 25   BUN mg/dL 35*   CREATININE mg/dL 1 58*   ANION GAP mmol/L 9   CALCIUM mg/dL 7 9*   GLUCOSE RANDOM mg/dL 136         Results from last 7 days   Lab Units 02/23/22  0608 02/22/22  2117 02/22/22  1620 02/22/22  1057   POC GLUCOSE mg/dl 116 180* 137 216*                   * I Have Reviewed All Lab Data Listed Above  * Additional Pertinent Lab Tests Reviewed: All Labs Within Last 24 Hours Reviewed    Imaging:    Imaging Reports Reviewed Today Include:  CT arm    Recent Cultures (last 7 days):           Last 24 Hours Medication List:   Current Facility-Administered Medications   Medication Dose Route Frequency Provider Last Rate    acetaminophen  650 mg Oral Q6H PRN Daysi Jha PA-C      ampicillin-sulbactam  3 g Intravenous Q6H Teena Sarmiento PA-C 3 g (02/23/22 0602)    carvedilol  25 mg Oral BID With Meals Bridgette Mancilla MD      heparin (porcine)  5,000 Units Subcutaneous Q8H [facility] Teena Sarmiento PA-C      insulin lispro  1-6 Units Subcutaneous TID AC Bridgette Mancilla MD      insulin lispro  1-6 Units Subcutaneous HS Bridgette Mancilla MD      morphine injection  2 mg Intravenous Q4H PRN Teena Sarmiento PA-C      ondansetron  4 mg Intravenous Q6H PRN Teena Sarmiento PA-C      oxyCODONE  10 mg Oral Q4H PRN Daysi Jha PA-C          Today, Patient Was Seen By: Filomena Saeed MD    ** Please Note: Dictation voice to text software may have been used in the creation of this document   **

## 2022-02-23 NOTE — CONSULTS
Consult: General Surgery  Raómn Winkler 59 y o  male MRN: [id number]  Unit/Bed#: SDS 01 Encounter: [id number]        Assessment/Plan     Assessment:  Patient is a 59 y o  male with pmhx of DM and HTN who presented after a dog bite and concern for abscess for which general surgery was consulted for evaluation  Afebrile, VSS  WBC: 5 05; Hb 3  CT RUE: skin thickening; and subcutaneous stranding overlying mid forearm due to cellulitis  No drainable fluid collection noted      Plan:  Could not appreciate area or fluctuance on exam  With negative CT scan as well, would recommend continued antibiotics and warm compress  History of Present Illness     HPI:  Ramón Winkler is a 59 y o  male with pmhx of DM and HTN who presented after a dog bite  Pt was bitten about a week ago while breaking up a dog fight  Pt was see in the ED on  and received tetanus booster at that time  Reportedly, the dog was uptodate with rabies vaccines  Pt was discharged from the ED at that time on clindamycin and doxycycline, which the patient took about 4-5 days of  He presents with continued erythema and increased pain and swelling of the area  He denies vomiting, fevers, decreased appetite, chest pain, SOB, issues with urination or defecation  He reports some nausea and some chills  Review of Systems   All other systems reviewed and are negative  Except as noted in above HPI  Consults    Historical Information   Past Medical History:   Diagnosis Date    High blood sugar     Hypertension      Past Surgical History:   Procedure Laterality Date    HERNIA REPAIR      KIDNEY SURGERY      MOUTH SURGERY       Social History   Social History     Substance and Sexual Activity   Alcohol Use Yes    Comment: ocassional     Social History     Substance and Sexual Activity   Drug Use Never     Social History     Tobacco Use   Smoking Status Never Smoker   Smokeless Tobacco Never Used     Family History: History reviewed   No pertinent family history  Meds/Allergies   PTA meds:   Prior to Admission Medications   Prescriptions Last Dose Informant Patient Reported? Taking? Alpha-Lipoic Acid 600 MG CAPS 2/21/2022 at Unknown time  Yes Yes   Sig: Take 600 mg by mouth 2 (two) times a day     Apple Cider Vinegar 300 MG TABS 2/21/2022 at Unknown time  Yes Yes   Sig: Take 300 mg by mouth daily     Ascorbic Acid (vitamin C) 1000 MG tablet 2/21/2022 at Unknown time  Yes Yes   Sig: Take 1,000 mg by mouth 2 (two) times a day   BENFOTIAMINE PO 2/21/2022 at Unknown time  Yes Yes   Sig: Take 300 mg by mouth 2 (two) times a day     Blood Glucose Monitoring Suppl (OneTouch Verio Sync System) w/Device KIT Not Taking at Unknown time Self No No   Sig: Use daily Test sugar daily in the morning     Patient not taking: Reported on 3/8/2021   CINNAMON PO 2/21/2022 at Unknown time  Yes Yes   Sig: Take by mouth   [facility] CINNAMON PO 2/21/2022 at Unknown time Self Yes Yes   Sig: Take by mouth 2 (two) times a day    Calcium Carbonate-Vit D-Min (Calcium 1200) 8833-4676 MG-UNIT CHEW Not Taking at Unknown time Self Yes No   Sig: Chew daily    Patient not taking: Reported on 2/17/2022    Chromium Picolinate 500 MCG TABS 2/21/2022 at Unknown time Self Yes Yes   Sig: Take by mouth daily    Empagliflozin (Jardiance) 25 MG TABS 2/20/2022  No Yes   Sig: Take 1 tablet (25 mg total) by mouth daily   Garlic 5744 MG CAPS 5/61/0549 at Unknown time Self Yes Yes   Sig: Take by mouth 2 (two) times a day    Magnesium ([facility] Triple Magnesium Complex) 400 MG CAPS 2/21/2022 at Unknown time  Yes Yes   Sig: Take by mouth daily   Magnesium-Calcium-Folic Acid (MAGNEBIND 526 PO) Not Taking at Unknown time Self Yes No   Sig: Take by mouth daily    Patient not taking: Reported on 2/17/2022    Misc Natural Products (Blood Sugar Balance) TABS   Yes Yes   Sig: Take by mouth 2 (two) times a day   Multiple Vitamin (MULTI VITAMIN MENS PO) 2/21/2022 at Unknown time  Yes Yes   Sig: Take by mouth once   Potassium 99 MG TABS 2/20/2022 Self Yes Yes   Sig: Take by mouth daily    Semaglutide,0 25 or 0 5MG/DOS, (Ozempic, 0 25 or 0 5 MG/DOSE,) 2 MG/1 5ML SOPN 2/20/2022  No Yes   Sig: Inject 0 25 mg under the skin once a week   Turmeric (QC TUMERIC COMPLEX PO) 2/21/2022 at Unknown time  Yes Yes   Sig: Take 1,000 mg by mouth once Tumeric /curcimin   VITAMIN D PO 2/21/2022 at Unknown time  Yes Yes   Sig: Take by mouth 2 (two) times a day   Zinc 50 MG CAPS 2/21/2022 at Unknown time Self Yes Yes   Sig: Take by mouth 2 (two) times a day    beta carotene 30 MG capsule 2/21/2022 at Unknown time Self Yes Yes   Sig: Take 30 mg by mouth 2 (two) times a day     carvedilol (COREG) 25 mg tablet 2/21/2022 at Unknown time  No Yes   Sig: Take 1 tablet (25 mg total) by mouth 2 (two) times a day with meals   clindamycin (CLEOCIN) 150 mg capsule 2/21/2022 at 1400  No Yes   Sig: Take 3 capsules (450 mg total) by mouth every 8 (eight) hours for 7 days   diazepam (VALIUM) 5 mg tablet Not Taking at Unknown time  No No   Sig: Take 1 tablet 30 minutes prior to MRI and may take additional tablet immediately prior to MRI as needed for anxiety   Patient not taking: Reported on 5/4/2021   doxycycline hyclate (VIBRAMYCIN) 100 mg capsule 2/21/2022 at 1000  No Yes   Sig: Take 1 capsule (100 mg total) by mouth every 12 (twelve) hours for 7 days   gabapentin (NEURONTIN) 300 mg capsule Not Taking at Unknown time  No No   Sig: Take 1 capsule (300 mg total) by mouth 3 (three) times a day   Patient not taking: Reported on 2/17/2022    glucose blood (OneTouch Verio) test strip Not Taking at Unknown time Self No No   Sig: Test sugar daily in the morning     Patient not taking: Reported on 3/8/2021   lisinopril-hydrochlorothiazide (PRINZIDE,ZESTORETIC) 20-12 5 MG per tablet 2/21/2022 at 0800  No Yes   Sig: Take 1 tablet by mouth 2 (two) times a day   methocarbamol (ROBAXIN) 750 mg tablet Not Taking at Unknown time  No No   Sig: Take 1 tablet (750 mg total) by mouth every 6 (six) hours as needed for muscle spasms   Patient not taking: Reported on 5/4/2021   naloxone (NARCAN) 4 mg/0 1 mL nasal spray Not Taking at Unknown time  No No   Sig: Administer 1 spray into a nostril  If no response after 2-3 minutes, give another dose in the other nostril using a new spray  Patient not taking: Reported on 6/17/2021   traMADol (ULTRAM) 50 mg tablet Not Taking at Unknown time  No No   Sig: Take 1 tablet (50 mg total) by mouth every 12 (twelve) hours as needed for moderate pain   Patient not taking: Reported on 2/17/2022    vitamin E, tocopherol, 400 units capsule 2/21/2022 at Unknown time Self Yes Yes   Sig: Take 400 Units by mouth daily      Facility-Administered Medications: None     Allergies   Allergen Reactions    Cephalexin Hives     Skin peeling    Metformin GI Intolerance       Objective   First Vitals:   Blood Pressure: 121/78 (02/21/22 1918)  Pulse: 80 (02/21/22 1918)  Temperature: 98 3 °F (36 8 °C) (02/21/22 1918)  Temp Source: Oral (02/21/22 1918)  Respirations: 18 (02/21/22 1918)  Height: 5' 11" (180 3 cm) (02/22/22 0039)  Weight - Scale: 120 kg (263 lb 10 7 oz) (02/21/22 1918)  SpO2: 96 % (02/21/22 1918)    Current Vitals:   Blood Pressure: 120/71 (02/23/22 1100)  Pulse: 84 (02/23/22 1100)  Temperature: 98 4 °F (36 9 °C) (02/23/22 1100)  Temp Source: Temporal (02/23/22 1100)  Respirations: 16 (02/23/22 1100)  Height: 5' 11" (180 3 cm) (02/22/22 0039)  Weight - Scale: 120 kg (263 lb 10 7 oz) (02/22/22 0039)  SpO2: 95 % (02/23/22 1100)    No intake or output data in the 24 hours ending 02/23/22 1208    Invasive Devices  Report    Peripheral Intravenous Line            Peripheral IV 02/21/22 Left Forearm 1 day                Physical Exam  Vitals reviewed  Constitutional:       General: He is not in acute distress  Appearance: He is not ill-appearing, toxic-appearing or diaphoretic  HENT:      Head: Normocephalic and atraumatic     Eyes:      Extraocular Movements: Extraocular movements intact  Cardiovascular:      Rate and Rhythm: Normal rate  Pulmonary:      Effort: Pulmonary effort is normal  No respiratory distress  Abdominal:      General: There is no distension  Palpations: Abdomen is soft  Tenderness: There is no abdominal tenderness  There is no guarding or rebound  Musculoskeletal:      Comments: RUE with some erythema and induration  Unable to appreciate area or fluctuance at this time  Skin:     General: Skin is warm and dry  Neurological:      Mental Status: He is alert and oriented to person, place, and time  Psychiatric:         Mood and Affect: Mood normal          Behavior: Behavior normal          Lab Results: CBC: No results found for: WBC, HGB, HCT, MCV, PLT, ADJUSTEDWBC, MCH, MCHC, RDW, MPV, NRBC, CMP: No results found for: SODIUM, K, CL, CO2, ANIONGAP, BUN, CREATININE, GLUCOSE, CALCIUM, AST, ALT, ALKPHOS, PROT, BILITOT, EGFR  Imaging: I have personally reviewed pertinent reports  CT upper extremity w contrast right   Final Result by Hannah Lance MD (02/21 2221)      Skin thickening and subcutaneous stranding overlying the mid forearm which may be due to cellulitis  No significant fluid collection to suggest drainable abscess  Workstation performed: [id number]             EKG, Pathology, and Other Studies: I have personally reviewed pertinent reports        Code Status: Level 1 - Full Code  Advance Directive and Living Will:      Power of :    POLST:

## 2022-02-23 NOTE — ASSESSMENT & PLAN NOTE
Lab Results   Component Value Date    HGBA1C 6 4 (H) 11/11/2021       Recent Labs     02/22/22  1057 02/22/22  1620 02/22/22  2117 02/23/22  0608   POCGLU 216* 137 180* 116     A1c is at goal  Mild hyperglycemia due to acute infection  Jardiance is on hold  Continue sliding scale insulin

## 2022-02-23 NOTE — CONSULTS
Consult: General Surgery  Angy Gonsales 59 y o  male MRN: [id number]  Unit/Bed#: SDS 01 Encounter: [id number]        Assessment/Plan     Assessment:  Patient is a 59 y o  male with pmhx of DM and HTN who presented after a dog bite and concern for abscess for which general surgery was consulted for evaluation  Afebrile, VSS  WBC: 5 05; Hb 3  CT RUE: skin thickening; and subcutaneous stranding overlying mid forearm due to cellulitis  No drainable fluid collection noted      Plan:  Could not appreciate area or fluctuance on exam  With negative CT scan as well, would recommend continued antibiotics and warm compress  History of Present Illness     HPI:  Angy Gonsales is a 59 y o  male with pmhx of DM and HTN who presented after a dog bite  Pt was bitten about a week ago while breaking up a dog fight  Pt was see in the ED on  and received tetanus booster at that time  Reportedly, the dog was uptodate with rabies vaccines  Pt was discharged from the ED at that time on clindamycin and doxycycline, which the patient took about 4-5 days of  He presents with continued erythema and increased pain and swelling of the area  He denies vomiting, fevers, decreased appetite, chest pain, SOB, issues with urination or defecation  He reports some nausea and some chills  Review of Systems   All other systems reviewed and are negative  Except as noted in above HPI  Consults    Historical Information   Past Medical History:   Diagnosis Date    High blood sugar     Hypertension      Past Surgical History:   Procedure Laterality Date    HERNIA REPAIR      KIDNEY SURGERY      MOUTH SURGERY       Social History   Social History     Substance and Sexual Activity   Alcohol Use Yes    Comment: ocassional     Social History     Substance and Sexual Activity   Drug Use Never     Social History     Tobacco Use   Smoking Status Never Smoker   Smokeless Tobacco Never Used     Family History: History reviewed   No pertinent family history  Meds/Allergies   PTA meds:   Prior to Admission Medications   Prescriptions Last Dose Informant Patient Reported? Taking? Alpha-Lipoic Acid 600 MG CAPS 2/21/2022 at Unknown time  Yes Yes   Sig: Take 600 mg by mouth 2 (two) times a day     Apple Cider Vinegar 300 MG TABS 2/21/2022 at Unknown time  Yes Yes   Sig: Take 300 mg by mouth daily     Ascorbic Acid (vitamin C) 1000 MG tablet 2/21/2022 at Unknown time  Yes Yes   Sig: Take 1,000 mg by mouth 2 (two) times a day   BENFOTIAMINE PO 2/21/2022 at Unknown time  Yes Yes   Sig: Take 300 mg by mouth 2 (two) times a day     Blood Glucose Monitoring Suppl (OneTouch Verio Sync System) w/Device KIT Not Taking at Unknown time Self No No   Sig: Use daily Test sugar daily in the morning     Patient not taking: Reported on 3/8/2021   CINNAMON PO 2/21/2022 at Unknown time  Yes Yes   Sig: Take by mouth   [facility] CINNAMON PO 2/21/2022 at Unknown time Self Yes Yes   Sig: Take by mouth 2 (two) times a day    Calcium Carbonate-Vit D-Min (Calcium 1200) 7568-1991 MG-UNIT CHEW Not Taking at Unknown time Self Yes No   Sig: Chew daily    Patient not taking: Reported on 2/17/2022    Chromium Picolinate 500 MCG TABS 2/21/2022 at Unknown time Self Yes Yes   Sig: Take by mouth daily    Empagliflozin (Jardiance) 25 MG TABS 2/20/2022  No Yes   Sig: Take 1 tablet (25 mg total) by mouth daily   Garlic 3494 MG CAPS 5/39/3316 at Unknown time Self Yes Yes   Sig: Take by mouth 2 (two) times a day    Magnesium ([facility] Triple Magnesium Complex) 400 MG CAPS 2/21/2022 at Unknown time  Yes Yes   Sig: Take by mouth daily   Magnesium-Calcium-Folic Acid (MAGNEBIND 160 PO) Not Taking at Unknown time Self Yes No   Sig: Take by mouth daily    Patient not taking: Reported on 2/17/2022    Misc Natural Products (Blood Sugar Balance) TABS   Yes Yes   Sig: Take by mouth 2 (two) times a day   Multiple Vitamin (MULTI VITAMIN MENS PO) 2/21/2022 at Unknown time  Yes Yes   Sig: Take by mouth once   Potassium 99 MG TABS 2/20/2022 Self Yes Yes   Sig: Take by mouth daily    Semaglutide,0 25 or 0 5MG/DOS, (Ozempic, 0 25 or 0 5 MG/DOSE,) 2 MG/1 5ML SOPN 2/20/2022  No Yes   Sig: Inject 0 25 mg under the skin once a week   Turmeric (QC TUMERIC COMPLEX PO) 2/21/2022 at Unknown time  Yes Yes   Sig: Take 1,000 mg by mouth once Tumeric /curcimin   VITAMIN D PO 2/21/2022 at Unknown time  Yes Yes   Sig: Take by mouth 2 (two) times a day   Zinc 50 MG CAPS 2/21/2022 at Unknown time Self Yes Yes   Sig: Take by mouth 2 (two) times a day    beta carotene 30 MG capsule 2/21/2022 at Unknown time Self Yes Yes   Sig: Take 30 mg by mouth 2 (two) times a day     carvedilol (COREG) 25 mg tablet 2/21/2022 at Unknown time  No Yes   Sig: Take 1 tablet (25 mg total) by mouth 2 (two) times a day with meals   clindamycin (CLEOCIN) 150 mg capsule 2/21/2022 at 1400  No Yes   Sig: Take 3 capsules (450 mg total) by mouth every 8 (eight) hours for 7 days   diazepam (VALIUM) 5 mg tablet Not Taking at Unknown time  No No   Sig: Take 1 tablet 30 minutes prior to MRI and may take additional tablet immediately prior to MRI as needed for anxiety   Patient not taking: Reported on 5/4/2021   doxycycline hyclate (VIBRAMYCIN) 100 mg capsule 2/21/2022 at 1000  No Yes   Sig: Take 1 capsule (100 mg total) by mouth every 12 (twelve) hours for 7 days   gabapentin (NEURONTIN) 300 mg capsule Not Taking at Unknown time  No No   Sig: Take 1 capsule (300 mg total) by mouth 3 (three) times a day   Patient not taking: Reported on 2/17/2022    glucose blood (OneTouch Verio) test strip Not Taking at Unknown time Self No No   Sig: Test sugar daily in the morning     Patient not taking: Reported on 3/8/2021   lisinopril-hydrochlorothiazide (PRINZIDE,ZESTORETIC) 20-12 5 MG per tablet 2/21/2022 at 0800  No Yes   Sig: Take 1 tablet by mouth 2 (two) times a day   methocarbamol (ROBAXIN) 750 mg tablet Not Taking at Unknown time  No No   Sig: Take 1 tablet (750 mg total) by mouth every 6 (six) hours as needed for muscle spasms   Patient not taking: Reported on 5/4/2021   naloxone (NARCAN) 4 mg/0 1 mL nasal spray Not Taking at Unknown time  No No   Sig: Administer 1 spray into a nostril  If no response after 2-3 minutes, give another dose in the other nostril using a new spray  Patient not taking: Reported on 6/17/2021   traMADol (ULTRAM) 50 mg tablet Not Taking at Unknown time  No No   Sig: Take 1 tablet (50 mg total) by mouth every 12 (twelve) hours as needed for moderate pain   Patient not taking: Reported on 2/17/2022    vitamin E, tocopherol, 400 units capsule 2/21/2022 at Unknown time Self Yes Yes   Sig: Take 400 Units by mouth daily      Facility-Administered Medications: None     Allergies   Allergen Reactions    Cephalexin Hives     Skin peeling    Metformin GI Intolerance       Objective   First Vitals:   Blood Pressure: 121/78 (02/21/22 1918)  Pulse: 80 (02/21/22 1918)  Temperature: 98 3 °F (36 8 °C) (02/21/22 1918)  Temp Source: Oral (02/21/22 1918)  Respirations: 18 (02/21/22 1918)  Height: 5' 11" (180 3 cm) (02/22/22 0039)  Weight - Scale: 120 kg (263 lb 10 7 oz) (02/21/22 1918)  SpO2: 96 % (02/21/22 1918)    Current Vitals:   Blood Pressure: 134/86 (02/23/22 0700)  Pulse: 79 (02/23/22 0700)  Temperature: 98 3 °F (36 8 °C) (02/23/22 0700)  Temp Source: Temporal (02/23/22 0700)  Respirations: 18 (02/22/22 2300)  Height: 5' 11" (180 3 cm) (02/22/22 0039)  Weight - Scale: 120 kg (263 lb 10 7 oz) (02/22/22 0039)  SpO2: 97 % (02/23/22 0700)      Intake/Output Summary (Last 24 hours) at 2/23/2022 1121  Last data filed at 2/22/2022 1204  Gross per 24 hour   Intake 400 ml   Output 1 ml   Net 399 ml       Invasive Devices  Report    Peripheral Intravenous Line            Peripheral IV 02/21/22 Left Forearm 1 day                Physical Exam  Vitals reviewed  Constitutional:       General: He is not in acute distress  Appearance: He is not ill-appearing, toxic-appearing or diaphoretic  HENT:      Head: Normocephalic and atraumatic  Eyes:      Extraocular Movements: Extraocular movements intact  Cardiovascular:      Rate and Rhythm: Normal rate  Pulmonary:      Effort: Pulmonary effort is normal  No respiratory distress  Abdominal:      General: There is no distension  Palpations: Abdomen is soft  Tenderness: There is no abdominal tenderness  There is no guarding or rebound  Musculoskeletal:      Comments: RUE with some erythema and induration  Unable to appreciate area or fluctuance at this time  Skin:     General: Skin is warm and dry  Neurological:      Mental Status: He is alert and oriented to person, place, and time  Psychiatric:         Mood and Affect: Mood normal          Behavior: Behavior normal          Lab Results: CBC: No results found for: WBC, HGB, HCT, MCV, PLT, ADJUSTEDWBC, MCH, MCHC, RDW, MPV, NRBC, CMP: No results found for: SODIUM, K, CL, CO2, ANIONGAP, BUN, CREATININE, GLUCOSE, CALCIUM, AST, ALT, ALKPHOS, PROT, BILITOT, EGFR  Imaging: I have personally reviewed pertinent reports  CT upper extremity w contrast right   Final Result by Stacy Brumfield MD (02/21 2221)      Skin thickening and subcutaneous stranding overlying the mid forearm which may be due to cellulitis  No significant fluid collection to suggest drainable abscess  Workstation performed: [id number]             EKG, Pathology, and Other Studies: I have personally reviewed pertinent reports        Code Status: Level 1 - Full Code  Advance Directive and Living Will:      Power of :    POLST:

## 2022-02-23 NOTE — ASSESSMENT & PLAN NOTE
Acute kidney with baseline creatinine 1 3-1 5  Improved with IV fluids and at baseline  Repeat BMP in [facility]

## 2022-02-23 NOTE — ASSESSMENT & PLAN NOTE
Dog bite with cellulitis of the right forearm  Failed outpatient treatment with doxycycline/clindamycin  Continue Unasyn currently on day to 3 g q 6  Cautious pain control

## 2022-02-23 NOTE — ASSESSMENT & PLAN NOTE
With elevated creatinine of 1 7 on admission  Improved with IV fluids and getting closer to baseline  Jardiance and lisinopril were held due to elevated creatinine, recent contrast exposure and risk for CRYSTAL

## 2022-02-23 NOTE — ASSESSMENT & PLAN NOTE
Lab Results   Component Value Date    HGBA1C 6 4 (H) 11/11/2021       Recent Labs     02/22/22  2117 02/23/22  0608 02/23/22  1052 02/23/22  1639   POCGLU 180* 116 215* 119     A1c is at goal  Mild hyperglycemia due to acute infection  Jardiance held and he was placed on sliding scale insulin temporarily while in the hospital

## 2022-02-23 NOTE — DISCHARGE SUMMARY
[address] [facility]  Discharge- Yon Bell 1965, 59 y o  male MRN: [id number]  Unit/Bed#: E5 -01 Encounter: [id number]  Primary Care Provider: Justin Jordan MD   Date and time admitted to hospital: 2/21/2022  7:30 PM    * Cellulitis  Assessment & Plan  Dog bite with cellulitis of the right forearm  Failed outpatient treatment with doxycycline/clindamycin  He was initiated on Unasyn 3 g q 6  Cautious pain control was administered    Dog bite of arm, right, sequela  Assessment & Plan  · Continues to have persistent pain and palpable area around the site of the bite concerning for developing abscess  General surgery was consulted and plan is for ongoing observation since there is no drainable abscess noted on exam at this time  CT showed no drainable abscess    Acute renal insufficiency  Assessment & Plan  With elevated creatinine of 1 7 on admission  Improved with IV fluids and getting closer to baseline  Jardiance and lisinopril were held due to elevated creatinine, recent contrast exposure and risk for CRYSTAL    Diabetes mellitus [facility])  Assessment & Plan  Lab Results   Component Value Date    HGBA1C 6 4 (H) 11/11/2021       Recent Labs     02/22/22  2117 02/23/22  0608 02/23/22  1052 02/23/22  1639   POCGLU 180* 116 215* 119     A1c is at goal  Mild hyperglycemia due to acute infection  Jardiance held and he was placed on sliding scale insulin temporarily while in the hospital    Essential hypertension  Assessment & Plan  Continue coreg 25 mg b i d    Hold lisinopril HCTZ due to recent renal insufficiency      Discharging Physician / Practitioner: Yuliet Sellers MD  PCP: Justin Jordan MD  Admission Date:   Admission Orders (From admission, onward)     Ordered        02/22/22 1324  Inpatient Admission  Once            02/21/22 2328  Place in Observation  Once                      Discharge Date: 02/23/22    Medical Problems             Resolved Problems  Date Reviewed: 2/23/2022    None                Consultations During Hospital Stay:  · General surgery    Procedures Performed:   · None    Significant Findings / Test Results:   · CT with contrast right forearm-skin thickening and subcutaneous stranding overlying the right mid forearm  no drainable abscess  · Creatinine 1 71    Incidental Findings:   · None     Test Results Pending at Discharge (will require follow up): · None     Outpatient Tests Requested:  · None    Complications:  None    Reason for Admission:  Right forearm pain    Hospital Course:     Rick Rabago is a 59 y o  male patient who originally presented to the hospital on 2/21/2022 due to persistent right forearm pain after being bitten by a dog  He was being treated with doxycycline and clindamycin prior to admission  He was started on IV Unasyn 3 g q 6 and was evaluated by surgery  No drainable abscess was noted at this time  Adequate pain control was given  His creatinine was noted to be elevated on admission  Thus he was hydrated and his Jardiance, lisinopril and HCTZ were held  This afternoon, the patient was agitated because he was not getting these medications  I tried to explain to him the rationale for holding the Jardiance and lisinopril  He would not listen and said that he is leaving and will be going to [facility]   I told him about the risks of worsening infection, permanent tissue damage, amputation, sepsis and death  He acknowledged these risks and still signed out against medical advice  Please see above list of diagnoses and related plan for additional information  Discharge Day Visit / Exam:     * Please refer to separate progress note for these details *    Discussion with Family:  Wife at bedside     Discharge instructions/Information to patient and family:   See after visit summary for information provided to patient and family        Provisions for Follow-Up Care:  See after visit summary for information related to follow-up care and any pertinent home health orders  Disposition:     Patient leaving against medical advice     Discharge Statement:  I spent <30 minutes discharging the patient  This time was spent on the day of discharge  I had direct contact with the patient on the day of discharge  Greater than 50% of the total time was spent examining patient, answering all patient questions, arranging and discussing plan of care with patient as well as directly providing post-discharge instructions  Additional time then spent on discharge activities  Discharge Medications:  See after visit summary for reconciled discharge medications provided to patient and family        ** Please Note: This note has been constructed using a voice recognition system **

## 2022-03-09 NOTE — PHYSICIAN ADVISOR
Current patient class: Inpatient  The patient is currently on Hospital Day: 3 at [address] [place] [address]      The patient was admitted to the hospital at  1:24 PM on 2/22/22 for the following diagnosis:  Cellulitis of arm, right [[id number]]       There is documentation in the medical record of an expected length of stay of at least 2 midnights  The patient is therefore expected to satisfy the 2 midnight benchmark and given the 2 midnight presumption is appropriate for INPATIENT ADMISSION  Given this expectation of a satisfying stay, CMS instructs us that the patient is most often appropriate for inpatient admission under part A provided medical necessity is documented in the chart  After review of the relevant documentation, labs, vital signs and test results, the patient is appropriate for INPATIENT ADMISSION  Admission to the hospital as an inpatient is a complex decision making process which requires the practitioner to consider the patients presenting complaint, history and physical examination and all relevant testing  With this in mind, in this case, the patient was deemed appropriate for INPATIENT ADMISSION  After review of the documentation and testing available at the time of the admission I concur with this clinical determination of medical necessity  Rationale is as follows: The patient is a 59 yrs old Male who presented to the ED at 2/21/2022  7:30 PM with a chief complaint of Dog Bite (pt arrives for a re-evaluation for a dog bite that happened last tuesday  pt was seen and recieved abx  pts dog was UTD on vax  the dog bite son on R forearm is swollen, warm and tender) Patient due to persistent right forearm pain after being bitten by a dog  He was being treated with doxycycline and clindamycin prior to admission  He was started on IV Unasyn 3 g q 6 and was evaluated by surgery  No drainable abscess was noted at this time so no surgical intervention recommended  Adequate pain control was given with oxycodone and IV morphine  His creatinine was noted to be elevated on admission to 1 7  He was hydrated with IVF and his Jardiance, lisinopril and HCTZ were held  Patient became angry that some of his meds were being held and left AMA on hospital day 3  The patients vitals on arrival were   ED Triage Vitals   Temperature Pulse Respirations Blood Pressure SpO2   02/21/22 1918 02/21/22 1918 02/21/22 1918 02/21/22 1918 02/21/22 1918   98 3 °F (36 8 °C) 80 18 121/78 96 %      Temp Source Heart Rate Source Patient Position - Orthostatic VS BP Location FiO2 (%)   02/21/22 1918 02/21/22 1918 02/21/22 1918 02/21/22 1918 --   Oral Monitor Sitting Left arm       Pain Score       02/21/22 2055       9           Past Medical History:   Diagnosis Date    High blood sugar     Hypertension      Past Surgical History:   Procedure Laterality Date    HERNIA REPAIR      KIDNEY SURGERY      MOUTH SURGERY             Consults have been placed to:   None    Vitals:    02/22/22 2300 02/23/22 0700 02/23/22 1100 02/23/22 1630   BP: 144/80 134/86 120/71 149/93   BP Location: Left arm  Left arm    Pulse: 72 79 84 86   Resp: 18  16 18   Temp: 97 7 °F (36 5 °C) 98 3 °F (36 8 °C) 98 4 °F (36 9 °C)    TempSrc: Temporal Temporal Temporal    SpO2: 98% 97% 95% 94%   Weight:       Height:           Most recent labs:    No results for input(s): WBC, HGB, HCT, PLT, K, NA, CALCIUM, BUN, CREATININE, LIPASE, AMYLASE, INR, TROPONINI, CKTOTAL, AST, ALT, ALKPHOS, BILITOT in the last 72 hours  Scheduled Meds:  Continuous Infusions:No current facility-administered medications for this encounter  PRN Meds:      Surgical procedures (if appropriate):

## 2022-03-16 DIAGNOSIS — E11.69 TYPE 2 DIABETES MELLITUS WITH OTHER SPECIFIED COMPLICATION, WITHOUT LONG-TERM CURRENT USE OF INSULIN (HCC): Primary | ICD-10-CM

## 2022-03-16 RX ORDER — BLOOD SUGAR DIAGNOSTIC
STRIP MISCELLANEOUS
Qty: 100 STRIP | Refills: 3 | Status: SHIPPED | OUTPATIENT
Start: 2022-03-16 | End: 2022-06-09

## 2022-03-21 ENCOUNTER — A-SCAN (OUTPATIENT)
Dept: URBAN - METROPOLITAN AREA CLINIC 6 | Facility: CLINIC | Age: 57
End: 2022-03-21

## 2022-03-21 DIAGNOSIS — Z96.1: ICD-10-CM

## 2022-03-21 PROCEDURE — 92136 OPHTHALMIC BIOMETRY: CPT

## 2022-03-21 PROCEDURE — 92012 INTRM OPH EXAM EST PATIENT: CPT

## 2022-03-21 ASSESSMENT — VISUAL ACUITY
OD_SC: 20/60+1
OU_SC: J3
OS_SC: 20/20
OD_PH: 20/50

## 2022-03-21 ASSESSMENT — KERATOMETRY
OS_K2POWER_DIOPTERS: 38.50
OS_K1POWER_DIOPTERS: 38.50
OS_AXISANGLE_DEGREES: 180
OS_AXISANGLE2_DEGREES: 90

## 2022-03-21 ASSESSMENT — TONOMETRY
OD_IOP_MMHG: 13
OS_IOP_MMHG: 15

## 2022-03-22 PROCEDURE — 99285 EMERGENCY DEPT VISIT HI MDM: CPT | Performed by: EMERGENCY MEDICINE

## 2022-03-29 ENCOUNTER — APPOINTMENT (EMERGENCY)
Dept: RADIOLOGY | Facility: HOSPITAL | Age: 57
End: 2022-03-29
Payer: MEDICARE

## 2022-03-29 ENCOUNTER — HOSPITAL ENCOUNTER (EMERGENCY)
Facility: HOSPITAL | Age: 57
Discharge: HOME | End: 2022-03-30
Attending: EMERGENCY MEDICINE
Payer: MEDICARE

## 2022-03-29 DIAGNOSIS — E11.621 DIABETIC ULCER OF LEFT FOOT (HCC): Primary | ICD-10-CM

## 2022-03-29 DIAGNOSIS — L97.529 DIABETIC ULCER OF LEFT FOOT (HCC): Primary | ICD-10-CM

## 2022-03-29 DIAGNOSIS — L03.116 CELLULITIS OF LEFT FOOT: ICD-10-CM

## 2022-03-29 LAB
ALBUMIN SERPL BCP-MCNC: 3.9 G/DL (ref 3.5–5)
ALP SERPL-CCNC: 59 U/L (ref 46–116)
ALT SERPL W P-5'-P-CCNC: 32 U/L (ref 12–78)
ANION GAP SERPL CALCULATED.3IONS-SCNC: 9 MMOL/L (ref 4–13)
APTT PPP: 32 SECONDS (ref 23–37)
AST SERPL W P-5'-P-CCNC: 15 U/L (ref 5–45)
BASOPHILS # BLD AUTO: 0.02 THOUSANDS/ΜL (ref 0–0.1)
BASOPHILS NFR BLD AUTO: 0 % (ref 0–1)
BILIRUB SERPL-MCNC: 0.39 MG/DL (ref 0.2–1)
BUN SERPL-MCNC: 36 MG/DL (ref 5–25)
CALCIUM SERPL-MCNC: 9.1 MG/DL (ref 8.3–10.1)
CHLORIDE SERPL-SCNC: 103 MMOL/L (ref 100–108)
CO2 SERPL-SCNC: 25 MMOL/L (ref 21–32)
CREAT SERPL-MCNC: 1.67 MG/DL (ref 0.6–1.3)
EOSINOPHIL # BLD AUTO: 0.11 THOUSAND/ΜL (ref 0–0.61)
EOSINOPHIL NFR BLD AUTO: 2 % (ref 0–6)
ERYTHROCYTE [DISTWIDTH] IN BLOOD BY AUTOMATED COUNT: 12.9 % (ref 11.6–15.1)
GFR SERPL CREATININE-BSD FRML MDRD: 44 ML/MIN/1.73SQ M
GLUCOSE SERPL-MCNC: 175 MG/DL (ref 65–140)
HCT VFR BLD AUTO: 40.1 % (ref 36.5–49.3)
HGB BLD-MCNC: 13.5 G/DL (ref 12–17)
IMM GRANULOCYTES # BLD AUTO: 0.01 THOUSAND/UL (ref 0–0.2)
IMM GRANULOCYTES NFR BLD AUTO: 0 % (ref 0–2)
INR PPP: 1.11 (ref 0.84–1.19)
LACTATE SERPL-SCNC: 0.7 MMOL/L (ref 0.5–2)
LYMPHOCYTES # BLD AUTO: 0.96 THOUSANDS/ΜL (ref 0.6–4.47)
LYMPHOCYTES NFR BLD AUTO: 21 % (ref 14–44)
MCH RBC QN AUTO: 31.9 PG (ref 26.8–34.3)
MCHC RBC AUTO-ENTMCNC: 33.7 G/DL (ref 31.4–37.4)
MCV RBC AUTO: 95 FL (ref 82–98)
MONOCYTES # BLD AUTO: 0.51 THOUSAND/ΜL (ref 0.17–1.22)
MONOCYTES NFR BLD AUTO: 11 % (ref 4–12)
NEUTROPHILS # BLD AUTO: 2.89 THOUSANDS/ΜL (ref 1.85–7.62)
NEUTS SEG NFR BLD AUTO: 66 % (ref 43–75)
NRBC BLD AUTO-RTO: 0 /100 WBCS
PLATELET # BLD AUTO: 212 THOUSANDS/UL (ref 149–390)
PMV BLD AUTO: 9.9 FL (ref 8.9–12.7)
POTASSIUM SERPL-SCNC: 4.8 MMOL/L (ref 3.5–5.3)
PROCALCITONIN SERPL-MCNC: 0.41 NG/ML
PROT SERPL-MCNC: 8.6 G/DL (ref 6.4–8.2)
PROTHROMBIN TIME: 14 SECONDS (ref 11.6–14.5)
RBC # BLD AUTO: 4.23 MILLION/UL (ref 3.88–5.62)
SODIUM SERPL-SCNC: 137 MMOL/L (ref 136–145)
WBC # BLD AUTO: 4.5 THOUSAND/UL (ref 4.31–10.16)

## 2022-03-29 PROCEDURE — 36415 COLL VENOUS BLD VENIPUNCTURE: CPT | Performed by: EMERGENCY MEDICINE

## 2022-03-29 PROCEDURE — 73630 X-RAY EXAM OF FOOT: CPT

## 2022-03-29 PROCEDURE — 85610 PROTHROMBIN TIME: CPT | Performed by: EMERGENCY MEDICINE

## 2022-03-29 PROCEDURE — 87070 CULTURE OTHR SPECIMN AEROBIC: CPT | Performed by: EMERGENCY MEDICINE

## 2022-03-29 PROCEDURE — 85730 THROMBOPLASTIN TIME PARTIAL: CPT | Performed by: EMERGENCY MEDICINE

## 2022-03-29 PROCEDURE — 87205 SMEAR GRAM STAIN: CPT | Performed by: EMERGENCY MEDICINE

## 2022-03-29 PROCEDURE — 80053 COMPREHEN METABOLIC PANEL: CPT | Performed by: EMERGENCY MEDICINE

## 2022-03-29 PROCEDURE — 99284 EMERGENCY DEPT VISIT MOD MDM: CPT

## 2022-03-29 PROCEDURE — 87040 BLOOD CULTURE FOR BACTERIA: CPT | Performed by: EMERGENCY MEDICINE

## 2022-03-29 PROCEDURE — 96366 THER/PROPH/DIAG IV INF ADDON: CPT

## 2022-03-29 PROCEDURE — 87077 CULTURE AEROBIC IDENTIFY: CPT | Performed by: EMERGENCY MEDICINE

## 2022-03-29 PROCEDURE — 85025 COMPLETE CBC W/AUTO DIFF WBC: CPT | Performed by: EMERGENCY MEDICINE

## 2022-03-29 PROCEDURE — 96365 THER/PROPH/DIAG IV INF INIT: CPT

## 2022-03-29 PROCEDURE — 83605 ASSAY OF LACTIC ACID: CPT | Performed by: EMERGENCY MEDICINE

## 2022-03-29 PROCEDURE — 87186 SC STD MICRODIL/AGAR DIL: CPT | Performed by: EMERGENCY MEDICINE

## 2022-03-29 PROCEDURE — 84145 PROCALCITONIN (PCT): CPT | Performed by: EMERGENCY MEDICINE

## 2022-03-29 RX ORDER — DOXYCYCLINE HYCLATE 100 MG/1
100 CAPSULE ORAL ONCE
Status: COMPLETED | OUTPATIENT
Start: 2022-03-29 | End: 2022-03-29

## 2022-03-29 RX ORDER — SODIUM CHLORIDE 9 MG/ML
3 INJECTION INTRAVENOUS ONCE
Status: DISCONTINUED | OUTPATIENT
Start: 2022-03-29 | End: 2022-03-30 | Stop reason: HOSPADM

## 2022-03-29 RX ADMIN — SODIUM CHLORIDE 3 G: 9 INJECTION, SOLUTION INTRAVENOUS at 21:54

## 2022-03-29 RX ADMIN — DOXYCYCLINE 100 MG: 100 CAPSULE ORAL at 21:54

## 2022-03-30 VITALS
TEMPERATURE: 98.4 F | HEART RATE: 83 BPM | BODY MASS INDEX: 36.1 KG/M2 | SYSTOLIC BLOOD PRESSURE: 133 MMHG | OXYGEN SATURATION: 99 % | DIASTOLIC BLOOD PRESSURE: 83 MMHG | RESPIRATION RATE: 18 BRPM | WEIGHT: 258.82 LBS

## 2022-03-30 RX ORDER — AMOXICILLIN AND CLAVULANATE POTASSIUM 875; 125 MG/1; MG/1
1 TABLET, FILM COATED ORAL EVERY 12 HOURS
Qty: 20 TABLET | Refills: 0 | Status: SHIPPED | OUTPATIENT
Start: 2022-03-30 | End: 2022-04-09

## 2022-03-30 RX ORDER — TRAMADOL HYDROCHLORIDE 50 MG/1
50 TABLET ORAL EVERY 6 HOURS PRN
Qty: 15 TABLET | Refills: 0 | Status: SHIPPED | OUTPATIENT
Start: 2022-03-30 | End: 2022-04-25

## 2022-03-30 RX ORDER — DOXYCYCLINE HYCLATE 100 MG/1
100 CAPSULE ORAL 2 TIMES DAILY
Qty: 20 CAPSULE | Refills: 0 | Status: SHIPPED | OUTPATIENT
Start: 2022-03-30 | End: 2022-04-09

## 2022-03-30 RX ORDER — TRAMADOL HYDROCHLORIDE 50 MG/1
50 TABLET ORAL ONCE
Status: COMPLETED | OUTPATIENT
Start: 2022-03-30 | End: 2022-03-30

## 2022-03-30 RX ADMIN — TRAMADOL HYDROCHLORIDE 50 MG: 50 TABLET, FILM COATED ORAL at 00:18

## 2022-04-01 ENCOUNTER — HOSPITAL ENCOUNTER (EMERGENCY)
Facility: HOSPITAL | Age: 57
Discharge: HOME | End: 2022-04-02
Attending: EMERGENCY MEDICINE
Payer: MEDICARE

## 2022-04-01 VITALS
WEIGHT: 257.28 LBS | HEART RATE: 76 BPM | SYSTOLIC BLOOD PRESSURE: 149 MMHG | RESPIRATION RATE: 18 BRPM | OXYGEN SATURATION: 96 % | DIASTOLIC BLOOD PRESSURE: 94 MMHG | BODY MASS INDEX: 35.88 KG/M2 | TEMPERATURE: 98.4 F

## 2022-04-01 DIAGNOSIS — L03.116 CELLULITIS OF LEFT FOOT: ICD-10-CM

## 2022-04-01 DIAGNOSIS — E11.65 HYPERGLYCEMIA DUE TO DIABETES MELLITUS (HCC): ICD-10-CM

## 2022-04-01 DIAGNOSIS — S91.302A WOUND OF LEFT FOOT: Primary | ICD-10-CM

## 2022-04-01 LAB
ANION GAP SERPL CALCULATED.3IONS-SCNC: 7 MMOL/L (ref 4–13)
BACTERIA WND AEROBE CULT: ABNORMAL
BACTERIA WND AEROBE CULT: ABNORMAL
BASOPHILS # BLD AUTO: 0.05 THOUSANDS/ΜL (ref 0–0.1)
BASOPHILS NFR BLD AUTO: 1 % (ref 0–1)
BUN SERPL-MCNC: 33 MG/DL (ref 5–25)
CALCIUM SERPL-MCNC: 9 MG/DL (ref 8.3–10.1)
CHLORIDE SERPL-SCNC: 101 MMOL/L (ref 100–108)
CO2 SERPL-SCNC: 26 MMOL/L (ref 21–32)
CREAT SERPL-MCNC: 1.57 MG/DL (ref 0.6–1.3)
CRP SERPL QL: 18.1 MG/L
EOSINOPHIL # BLD AUTO: 0.22 THOUSAND/ΜL (ref 0–0.61)
EOSINOPHIL NFR BLD AUTO: 4 % (ref 0–6)
ERYTHROCYTE [DISTWIDTH] IN BLOOD BY AUTOMATED COUNT: 12.7 % (ref 11.6–15.1)
ERYTHROCYTE [SEDIMENTATION RATE] IN BLOOD: 34 MM/HOUR (ref 0–19)
GFR SERPL CREATININE-BSD FRML MDRD: 47 ML/MIN/1.73SQ M
GLUCOSE SERPL-MCNC: 214 MG/DL (ref 65–140)
GRAM STN SPEC: ABNORMAL
GRAM STN SPEC: ABNORMAL
HCT VFR BLD AUTO: 37.5 % (ref 36.5–49.3)
HGB BLD-MCNC: 13.2 G/DL (ref 12–17)
IMM GRANULOCYTES # BLD AUTO: 0.01 THOUSAND/UL (ref 0–0.2)
IMM GRANULOCYTES NFR BLD AUTO: 0 % (ref 0–2)
LACTATE SERPL-SCNC: 1.3 MMOL/L (ref 0.5–2)
LYMPHOCYTES # BLD AUTO: 1.2 THOUSANDS/ΜL (ref 0.6–4.47)
LYMPHOCYTES NFR BLD AUTO: 22 % (ref 14–44)
MCH RBC QN AUTO: 32.1 PG (ref 26.8–34.3)
MCHC RBC AUTO-ENTMCNC: 35.2 G/DL (ref 31.4–37.4)
MCV RBC AUTO: 91 FL (ref 82–98)
MONOCYTES # BLD AUTO: 0.46 THOUSAND/ΜL (ref 0.17–1.22)
MONOCYTES NFR BLD AUTO: 8 % (ref 4–12)
NEUTROPHILS # BLD AUTO: 3.64 THOUSANDS/ΜL (ref 1.85–7.62)
NEUTS SEG NFR BLD AUTO: 65 % (ref 43–75)
NRBC BLD AUTO-RTO: 0 /100 WBCS
PLATELET # BLD AUTO: 231 THOUSANDS/UL (ref 149–390)
PMV BLD AUTO: 9.6 FL (ref 8.9–12.7)
POTASSIUM SERPL-SCNC: 4.9 MMOL/L (ref 3.5–5.3)
RBC # BLD AUTO: 4.11 MILLION/UL (ref 3.88–5.62)
SODIUM SERPL-SCNC: 134 MMOL/L (ref 136–145)
WBC # BLD AUTO: 5.58 THOUSAND/UL (ref 4.31–10.16)

## 2022-04-01 PROCEDURE — 99283 EMERGENCY DEPT VISIT LOW MDM: CPT

## 2022-04-01 PROCEDURE — 85025 COMPLETE CBC W/AUTO DIFF WBC: CPT | Performed by: PHYSICIAN ASSISTANT

## 2022-04-01 PROCEDURE — 99285 EMERGENCY DEPT VISIT HI MDM: CPT | Performed by: PHYSICIAN ASSISTANT

## 2022-04-01 PROCEDURE — 83605 ASSAY OF LACTIC ACID: CPT | Performed by: PHYSICIAN ASSISTANT

## 2022-04-01 PROCEDURE — 86140 C-REACTIVE PROTEIN: CPT | Performed by: PHYSICIAN ASSISTANT

## 2022-04-01 PROCEDURE — 85652 RBC SED RATE AUTOMATED: CPT | Performed by: PHYSICIAN ASSISTANT

## 2022-04-01 PROCEDURE — 96374 THER/PROPH/DIAG INJ IV PUSH: CPT

## 2022-04-01 PROCEDURE — 36415 COLL VENOUS BLD VENIPUNCTURE: CPT | Performed by: PHYSICIAN ASSISTANT

## 2022-04-01 PROCEDURE — 80048 BASIC METABOLIC PNL TOTAL CA: CPT | Performed by: PHYSICIAN ASSISTANT

## 2022-04-01 RX ORDER — MORPHINE SULFATE 4 MG/ML
4 INJECTION, SOLUTION INTRAMUSCULAR; INTRAVENOUS ONCE
Status: COMPLETED | OUTPATIENT
Start: 2022-04-01 | End: 2022-04-01

## 2022-04-01 RX ADMIN — MORPHINE SULFATE 4 MG: 4 INJECTION INTRAVENOUS at 21:55

## 2022-04-02 RX ORDER — HYDROCODONE BITARTRATE AND ACETAMINOPHEN 5; 325 MG/1; MG/1
1 TABLET ORAL EVERY 6 HOURS PRN
Qty: 10 TABLET | Refills: 0 | Status: SHIPPED | OUTPATIENT
Start: 2022-04-02 | End: 2022-04-25

## 2022-04-02 NOTE — DISCHARGE INSTRUCTIONS
Discharge Instructions - Podiatry    Weight Bearing Status: Weight bearing as tolerated in surgical shoe                   Pain: Continue analgesics as directed    Follow-up appointment instructions: Please make an appointment within one week of discharge with Dr Ernesto Leon  Contact sooner if any increase in pain, or signs of infection occur    Wound Care: Leave dressings clean, dry, and intact between professional dressing changes    Nursing Instructions: Please apply Betadine wet to dry  Then cover with Gauze and secure with Kerlix and tape  Please change dressing every day

## 2022-04-02 NOTE — ED PROVIDER NOTES
History  Chief Complaint   Patient presents with    Foot Ulcer     Pt was told by ER provider that if ulcer was not better by today pt was told to come back to ER for evaluation  57y  o male with PMH of DM and HTN presents to the ER for wound to his left foot for 1 week  Patient was seen in our ER on 3/29 for symptoms  He had labs completed and was discharged home on Doxycycline and Augmentin, which he has been taking, along with Tramadol for pain  Patient states symptoms seem to be worsening  He reports worsening redness and pain  He describes his pain as sharp and radiating up his leg  Symptoms are constant  He denies fever, chills, URI symptoms, chest pain, dyspnea, N/V/D, abdominal pain, weakness or paresthesias  He reports blood and pus drainage from the site  History provided by:  Patient   used: No        Prior to Admission Medications   Prescriptions Last Dose Informant Patient Reported? Taking? Alpha-Lipoic Acid 600 MG CAPS 4/1/2022 at Unknown time  Yes Yes   Sig: Take 600 mg by mouth 2 (two) times a day     Apple Cider Vinegar 300 MG TABS 4/1/2022 at Unknown time  Yes Yes   Sig: Take 300 mg by mouth daily     Ascorbic Acid (vitamin C) 1000 MG tablet 4/1/2022 at Unknown time  Yes Yes   Sig: Take 1,000 mg by mouth 2 (two) times a day   BENFOTIAMINE PO 4/1/2022 at Unknown time  Yes Yes   Sig: Take 300 mg by mouth 2 (two) times a day     Blood Glucose Monitoring Suppl (OneTouch Verio Sync System) w/Device KIT  Self No No   Sig: Use daily Test sugar daily in the morning     Patient not taking: Reported on 3/8/2021   [facility] CINNAMON PO 4/1/2022 at Unknown time Self Yes Yes   Sig: Take by mouth 2 (two) times a day    Chromium Picolinate 500 MCG TABS 4/1/2022 at Unknown time Self Yes Yes   Sig: Take by mouth daily    Empagliflozin (Jardiance) 25 MG TABS 4/1/2022 at Unknown time  No Yes   Sig: Take 1 tablet (25 mg total) by mouth daily   Garlic 6264 MG CAPS 8/7/2210 at Unknown time Self Yes Yes   Sig: Take by mouth 2 (two) times a day    Magnesium ([facility]S Triple Magnesium Complex) 400 MG CAPS 4/1/2022 at Unknown time  Yes Yes   Sig: Take by mouth daily   Misc Natural Products (Blood Sugar Balance) TABS   Yes No   Sig: Take by mouth 2 (two) times a day   Multiple Vitamin (MULTI VITAMIN MENS PO) 4/1/2022 at Unknown time  Yes Yes   Sig: Take by mouth once   Potassium 99 MG TABS 4/1/2022 at Unknown time Self Yes Yes   Sig: Take by mouth daily    Semaglutide,0 25 or 0 5MG/DOS, (Ozempic, 0 25 or 0 5 MG/DOSE,) 2 MG/1 5ML SOPN Past Week at Unknown time  No Yes   Sig: Inject 0 25 mg under the skin once a week   Turmeric (QC TUMERIC COMPLEX PO) 4/1/2022 at Unknown time  Yes Yes   Sig: Take 1,000 mg by mouth once Tumeric /curcimin   VITAMIN D PO 4/1/2022 at Unknown time  Yes Yes   Sig: Take by mouth 2 (two) times a day   Zinc 50 MG CAPS 4/1/2022 at Unknown time Self Yes Yes   Sig: Take by mouth 2 (two) times a day    amoxicillin-clavulanate (AUGMENTIN) 875-125 mg per tablet 4/1/2022 at Unknown time  No Yes   Sig: Take 1 tablet by mouth every 12 (twelve) hours for 10 days   beta carotene 30 MG capsule 4/1/2022 at Unknown time Self Yes Yes   Sig: Take 30 mg by mouth 2 (two) times a day     carvedilol (COREG) 25 mg tablet 4/1/2022 at Unknown time  No Yes   Sig: Take 1 tablet (25 mg total) by mouth 2 (two) times a day with meals   doxycycline hyclate (VIBRAMYCIN) 100 mg capsule 4/1/2022 at Unknown time  No Yes   Sig: Take 1 capsule (100 mg total) by mouth 2 (two) times a day for 10 days   glucose blood (Contour Next Test) test strip   No No   Sig: Use to check blood sugar once a day   glucose blood (OneTouch Verio) test strip  Self No No   Sig: Test sugar daily in the morning     Patient not taking: Reported on 3/8/2021   lisinopril-hydrochlorothiazide (PRINZIDE,ZESTORETIC) 20-12 5 MG per tablet 4/1/2022 at Unknown time  No Yes   Sig: Take 1 tablet by mouth 2 (two) times a day   naloxone (NARCAN) 4 mg/0 1 mL nasal spray   No No   Sig: Administer 1 spray into a nostril  If no response after 2-3 minutes, give another dose in the other nostril using a new spray  Patient not taking: Reported on 6/17/2021   traMADol (ULTRAM) 50 mg tablet   No Yes   Sig: Take 1 tablet (50 mg total) by mouth every 12 (twelve) hours as needed for moderate pain   traMADol (Ultram) 50 mg tablet   No No   Sig: Take 1 tablet (50 mg total) by mouth every 6 (six) hours as needed for moderate pain for up to 15 doses   vitamin E, tocopherol, 400 units capsule 4/1/2022 at Unknown time Self Yes Yes   Sig: Take 400 Units by mouth daily      Facility-Administered Medications: None       Past Medical History:   Diagnosis Date    High blood sugar     Hypertension        Past Surgical History:   Procedure Laterality Date    HERNIA REPAIR      KIDNEY SURGERY      MOUTH SURGERY         History reviewed  No pertinent family history  I have reviewed and agree with the history as documented  E-Cigarette/Vaping    E-Cigarette Use Never User      E-Cigarette/Vaping Substances    Nicotine No     THC No     CBD No     Flavoring No     Other No     Unknown No      Social History     Tobacco Use    Smoking status: Never Smoker    Smokeless tobacco: Never Used   Vaping Use    Vaping Use: Never used   Substance Use Topics    Alcohol use: Yes     Comment: ocassional    Drug use: Never       Review of Systems   Constitutional: Negative for activity change, appetite change, chills and fever  HENT: Negative for congestion, drooling, ear discharge, ear pain, facial swelling, rhinorrhea and sore throat  Eyes: Negative for redness  Respiratory: Negative for cough and shortness of breath  Cardiovascular: Negative for chest pain  Gastrointestinal: Negative for abdominal pain, diarrhea, nausea and vomiting  Musculoskeletal: Negative for neck stiffness  Skin: Positive for wound (left foot)  Negative for rash     Allergic/Immunologic: Negative for food allergies  Neurological: Negative for weakness and numbness  Physical Exam  Physical Exam  Vitals and nursing note reviewed  Constitutional:       General: He is not in acute distress  Appearance: He is not toxic-appearing  HENT:      Head: Normocephalic and atraumatic  Eyes:      Conjunctiva/sclera: Conjunctivae normal    Neck:      Trachea: No tracheal deviation  Cardiovascular:      Rate and Rhythm: Normal rate  Pulmonary:      Effort: Pulmonary effort is normal  No respiratory distress  Abdominal:      General: There is no distension  Musculoskeletal:      Cervical back: Normal range of motion and neck supple  Left foot: Normal range of motion  Tenderness and bony tenderness present  No swelling, laceration or crepitus  Normal pulse  Feet:    Skin:     General: Skin is warm and dry  Findings: No rash  Neurological:      Mental Status: He is alert  GCS: GCS eye subscore is 4  GCS verbal subscore is 5  GCS motor subscore is 6     Psychiatric:         Mood and Affect: Mood normal                  Vital Signs  ED Triage Vitals   Temperature Pulse Respirations Blood Pressure SpO2   04/01/22 2032 04/01/22 2032 04/01/22 2032 04/01/22 2032 04/01/22 2032   98 4 °F (36 9 °C) 91 20 148/91 97 %      Temp Source Heart Rate Source Patient Position - Orthostatic VS BP Location FiO2 (%)   04/01/22 2032 04/01/22 2032 04/01/22 2032 04/01/22 2032 --   Oral Monitor Sitting Right arm       Pain Score       04/01/22 2155       9           Vitals:    04/01/22 2032 04/01/22 2249   BP: 148/91 149/94   Pulse: 91 76   Patient Position - Orthostatic VS: Sitting Lying         Visual Acuity      ED Medications  Medications   morphine (PF) 4 mg/mL injection 4 mg (4 mg Intravenous Given 4/1/22 2155)       Diagnostic Studies  Results Reviewed     Procedure Component Value Units Date/Time    Basic metabolic panel [[id number]]  (Abnormal) Collected: 04/01/22 2154    Lab Status: Final result Specimen: Blood from Arm, Right Updated: 04/01/22 2251     Sodium 134 mmol/L      Potassium 4 9 mmol/L      Chloride 101 mmol/L      CO2 26 mmol/L      ANION GAP 7 mmol/L      BUN 33 mg/dL      Creatinine 1 57 mg/dL      Glucose 214 mg/dL      Calcium 9 0 mg/dL      eGFR 47 ml/min/1 73sq m     Narrative:      [place] guidelines for Chronic Kidney Disease (CKD):     Stage 1 with normal or high GFR (GFR > 90 mL/min/1 73 square meters)    Stage 2 Mild CKD (GFR = 60-89 mL/min/1 73 square meters)    Stage 3A Moderate CKD (GFR = 45-59 mL/min/1 73 square meters)    Stage 3B Moderate CKD (GFR = 30-44 mL/min/1 73 square meters)    Stage 4 Severe CKD (GFR = 15-29 mL/min/1 73 square meters)    Stage 5 End Stage CKD (GFR <15 mL/min/1 73 square meters)  Note: GFR calculation is accurate only with a steady state creatinine    C-reactive protein [[id number]]  (Abnormal) Collected: 04/01/22 2154    Lab Status: Final result Specimen: Blood from Arm, Right Updated: 04/01/22 2251     CRP 18 1 mg/L     Narrative:      Note: Unit of Measure change to mg/L at [facility] will show at 10 fold increase in CRP result to match network standards  Lactic acid [[id number]]  (Normal) Collected: 04/01/22 2154    Lab Status: Final result Specimen: Blood from Arm, Right Updated: 04/01/22 2224     LACTIC ACID 1 3 mmol/L     Narrative:      Result may be elevated if tourniquet was used during collection      Sedimentation rate, automated [[id number]]  (Abnormal) Collected: 04/01/22 2154    Lab Status: Final result Specimen: Blood from Arm, Right Updated: 04/01/22 2217     Sed Rate 34 mm/hour     CBC and differential [[id number]] Collected: 04/01/22 2154    Lab Status: Final result Specimen: Blood from Arm, Right Updated: 04/01/22 2207     WBC 5 58 Thousand/uL      RBC 4 11 Million/uL      Hemoglobin 13 2 g/dL      Hematocrit 37 5 %      MCV 91 fL      MCH 32 1 pg      MCHC 35 2 g/dL      RDW 12 7 %      MPV 9 6 fL Platelets 009 Thousands/uL      nRBC 0 /100 WBCs      Neutrophils Relative 65 %      Immat GRANS % 0 %      Lymphocytes Relative 22 %      Monocytes Relative 8 %      Eosinophils Relative 4 %      Basophils Relative 1 %      Neutrophils Absolute 3 64 Thousands/µL      Immature Grans Absolute 0 01 Thousand/uL      Lymphocytes Absolute 1 20 Thousands/µL      Monocytes Absolute 0 46 Thousand/µL      Eosinophils Absolute 0 22 Thousand/µL      Basophils Absolute 0 05 Thousands/µL                  No orders to display              Procedures  Procedures         ED Course                                             MDM  Number of Diagnoses or Management Options  Cellulitis of left foot: new and requires workup  Hyperglycemia due to diabetes mellitus [facility]): new and requires workup  Wound of left foot: new and requires workup  Diagnosis management comments: DDX consists of but not limited to: ulcer, infection    2136 - Gonzales text sent to Podiatry  2138 - Spoke with Lino Briceno from Podiatry  Will repeat labs and she will be in to see patient  2329 - Patient seen by Podiatry  Plan is to discharge patient and have him continue his outpatient antibiotics  Patient is to follow up with Dr Meek Thomas from Podiatry  He is also supposed to wear the surgical shoe he was given  2340 - Informed patient of discharge plan  Patient agreeable  Patient requesting a stronger pain medication since the Tramadol is not helping much  Will give a short course of Norco     The management plan was discussed in detail with the patient at bedside and all questions were answered  Prior to discharge, we provided both verbal and written instructions  We discussed with the patient the signs and symptoms for which to return to the emergency department  All questions were answered and patient was comfortable with the plan of care and discharged to home    Instructed the patient to follow up with the primary care provider and/or specialist provided and their written instructions  The patient verbalized understanding of our discussion and plan of care, and agrees to return to the Emergency Department for concerns and progression of illness  At discharge, I instructed the patient to:  -follow up with pcp  -continue taking antibiotics as previously prescribed  -take Norco as prescribed  -keep area clean  -follow up with Podiatry  -watch for signs of worsening infection: increased redness, swelling or discharge  -wear Podiatry shoe  -return to the ER if symptoms worsened or new symptoms arose  Patient agreed to this plan and was stable at time of discharge  Amount and/or Complexity of Data Reviewed  Clinical lab tests: ordered and reviewed  Obtain history from someone other than the patient: yes  Review and summarize past medical records: yes  Discuss the patient with other providers: yes    Patient Progress  Patient progress: stable      Disposition  Final diagnoses:   Wound of left foot   Cellulitis of left foot   Hyperglycemia due to diabetes mellitus ([facility] [address] )     Time reflects when diagnosis was documented in both MDM as applicable and the Disposition within this note     Time User Action Codes Description Comment    4/1/2022 11:24 PM Jerry Torres [facility] Wound of left foot     4/1/2022 11:40 PM Crow LOMAS Add [B17 [id number]] Cellulitis of left foot     4/1/2022 11:40 PM Crow LOMAS Add [E11 [id number]] Hyperglycemia due to diabetes mellitus [facility])       ED Disposition     ED Disposition Condition Date/Time Comment    Discharge Stable Fri Apr 1, 2022 11:40 PM Corrie Jeong discharge to home/self care              Follow-up Information     Follow up With Specialties Details Why Contact Info    Mindy Moya DPM Podiatry, Wound Care Follow up  [address] [place] [address]  [phone]            Patient's Medications   Discharge Prescriptions    HYDROCODONE-ACETAMINOPHEN (NORCO) 5-325 MG PER TABLET    Take 1 tablet by mouth every 6 (six) hours as needed for pain Max Daily Amount: 4 tablets       Start Date: 4/2/2022  End Date: --       Order Dose: 1 tablet       Quantity: 10 tablet    Refills: 0       No discharge procedures on file      PDMP Review       Value Time User    PDMP Reviewed  Yes 4/2/2022 12:08 AM Laurie Gonsalves PA-C          ED Provider  Electronically Signed by           Laurie Gonsalves PA-C  04/02/22 0041

## 2022-04-02 NOTE — CONSULTS
Podiatry - Consultation    Patient Information:   Tawana Irving 59 y o  male MRN: [id number]  Unit/Bed#: ED 28 Encounter: [id number]  PCP: Moe Singh MD  Date of Admission:  4/1/2022  Date of Consultation: 04/01/22  Requesting Physician: Lizett Rodriguez*      ASSESSMENT:    Tawana Irving is a 59 y o  male with:    1  Left submetatarsal 5 wound- Concepcion 2-POA  2  Type 2 diabetes  3  CKD stage 2    PLAN:    · Left foot wound evaluated and debrided at bedside  Wound stable, with mild surrounding erythema,  no purulence, no malodor, no ascending erythema, no crepitus, no fluctuance found on exam   Patient is stable for discharge home  He is to follow-up outpatient with Dr Aissatou Means  · Continue oral antibiotics as prescribed  · Local wound care consisting of betadine wet to dry dressing  Wound care instructions placed  · Elevation on green foam wedges or pillows when non-ambulatory  · Rest of care per primary team   · Plan discussed with my attending  Weightbearing status:  As tolerated in surgical shoe    Debridement Procedure:  After  Verbal Consent was obtained and time out performed  Wound located left submet 5 was  excisionally Surgical- Subcutaneous  (CPT: 42611) debrided using scapel  Pre-debridement wound measures 0 1 cm x 0 2 cm x 0 1 cm  Pain was controlled by Lack of sensation due to Neuropathy   Post debridement measurement 3 cm x 2 cm x 0 2 cm for a total of <20 square centimeters, with wound appearing Fresh bleeding tissue, viable and granular  100%  of wound debrided  Tissue debrided includes depth of Subcutaneous with devitalized tissue being debrided including Hyperkeratosis  with a small amount of bleeding bleeding was noted during procedure  Hemostasis was achieved using pressure  Procedure was Well tolerated by patient  SUBJECTIVE:    History of Present Illness:    Tawana Irving is a 59 y o  male who is evaluated in ED due to left foot wound   Patient has a past medical history of type 2 diabetes, chronic kidney disease stage due, restless leg syndrome, radiculopathy  We are consulted for left foot wound  Patient was seen in ED 2 days ago for evaluation of left foot wound  Patient states he has been worsening over the last couple days and because he has diabetes he had high concern for wound on foot  Patient does not recall any trauma to foot, he said he may have possibly stepped on something but does not know for sure  He reports drainage after taking shower today and surrounding redness and wound area  He reports taking oral antibiotics as prescribed and changing dressing 3 times a day with clean Band-Aid  He denies any nausea, vomiting, fever, chills, chest pain or shortness of breath at this time  Review of Systems:    Constitutional: Negative  HENT: Negative  Eyes: Negative  Respiratory: Negative  Cardiovascular: Negative  Gastrointestinal: Negative  Musculoskeletal:  Pain left foot   Skin:  Wound left foot  Neurological:  Paresthesias bilateral feet  Psych: Negative  Past Medical and Surgical History:     Past Medical History:   Diagnosis Date    High blood sugar     Hypertension        Past Surgical History:   Procedure Laterality Date    HERNIA REPAIR      KIDNEY SURGERY      MOUTH SURGERY         Meds/Allergies:    (Not in a hospital admission)      Allergies   Allergen Reactions    Cephalexin Hives     Skin peeling    Metformin GI Intolerance       Social History:     Marital Status:     Substance Use History:   Social History     Substance and Sexual Activity   Alcohol Use Yes    Comment: ocassional     Social History     Tobacco Use   Smoking Status Never Smoker   Smokeless Tobacco Never Used     Social History     Substance and Sexual Activity   Drug Use Never       Family History:    History reviewed  No pertinent family history        OBJECTIVE:    Vitals:   Blood Pressure: 149/94 (04/01/22 2249)  Pulse: 76 (04/01/22 2249)  Temperature: 98 4 °F (36 9 °C) (04/01/22 2032)  Temp Source: Oral (04/01/22 2032)  Respirations: 18 (04/01/22 2249)  Weight - Scale: 117 kg (257 lb 4 4 oz) (04/01/22 2032)  SpO2: 96 % (04/01/22 2249)    Physical Exam:    General Appearance: Alert, cooperative, no distress  HEENT: Head normocephalic, atraumatic, without obvious abnormality  Heart: Normal rate and rhythm  Lungs: Non-labored breathing  No respiratory distress  Abdomen: Without distension  Psychiatric: AAOx3  Lower Extremity:  Vascular:   Right DP and PT pulses are present  Left DP and PT pulses are present  CRT < 3 seconds at the digits  +1/4 edema noted at left lateral foot  Pedal hair is absent  Skin temperature is WNL bilaterally  Musculoskeletal:  MMT is 5/5 in all muscle compartments bilaterally  Pain on palpation of left foot including arch  No gross deformities noted  Dermatological:  Lower extremity wound(s) as noted below:    Wound #: 1  Location: left submet 5 and lateral foot  Length 5cm: Width 3cm: Depth 0 2cm:   Deepest Tissue Noted in Base: subq  Probe to Bone: No  Peripheral Skin Description: Attached  Granulation: 100% Fibrotic Tissue: 0% Necrotic Tissue: 0%   Drainage Amount: minimal, clear, bloody  Signs of Infection: No  Surrounding erythema    Neurological:  Gross sensation is intact  Protective sensation is intact  Patient Reports numbness and/or paresthesias  Clinical Images 04/01/22:                   Additional data:     Lab Results: I have personally reviewed pertinent labs including:    Results from last 7 days   Lab Units 04/01/22 2154   WBC Thousand/uL 5 58   HEMOGLOBIN g/dL 13 2   HEMATOCRIT % 37 5   PLATELETS Thousands/uL 231   NEUTROS PCT % 65   LYMPHS PCT % 22   MONOS PCT % 8   EOS PCT % 4     Results from last 7 days   Lab Units 04/01/22 2154 03/29/22 2128 03/29/22 2128   POTASSIUM mmol/L 4 9   < > 4 8   CHLORIDE mmol/L 101   < > 103   CO2 mmol/L 26   < > 25   BUN mg/dL 33*   < > 36* CREATININE mg/dL 1 57*   < > 1 67*   CALCIUM mg/dL 9 0   < > 9 1   ALK PHOS U/L  --   --  59   ALT U/L  --   --  32   AST U/L  --   --  15    < > = values in this interval not displayed  Results from last 7 days   Lab Units 03/29/22 2128   INR  1 11       Cultures: I have personally reviewed pertinent cultures including:    Results from last 7 days   Lab Units 03/29/22 2141 03/29/22 2136 03/29/22 2128   BLOOD CULTURE   --  No Growth at 48 hrs  No Growth at 48 hrs  GRAM STAIN RESULT  1+ Polys  No bacteria seen  --   --    WOUND CULTURE  2+ Growth of Escherichia coli*  2+ Growth of   --   --            Imaging: I have personally reviewed pertinent reports in PACS  EKG, Pathology, and Other Studies: I have personally reviewed pertinent reports  ** Please Note: Portions of the record may have been created with voice recognition software  Occasional wrong word or "sound a like" substitutions may have occurred due to the inherent limitations of voice recognition software  Read the chart carefully and recognize, using context, where substitutions have occurred   **

## 2022-04-04 LAB
BACTERIA BLD CULT: NORMAL
BACTERIA BLD CULT: NORMAL

## 2022-04-08 ENCOUNTER — OFFICE VISIT (OUTPATIENT)
Dept: ENDOCRINOLOGY | Facility: CLINIC | Age: 57
End: 2022-04-08
Payer: MEDICARE

## 2022-04-08 VITALS
DIASTOLIC BLOOD PRESSURE: 80 MMHG | HEIGHT: 71 IN | HEART RATE: 89 BPM | SYSTOLIC BLOOD PRESSURE: 102 MMHG | BODY MASS INDEX: 36.96 KG/M2 | WEIGHT: 264 LBS

## 2022-04-08 DIAGNOSIS — E66.01 CLASS 2 SEVERE OBESITY WITH SERIOUS COMORBIDITY AND BODY MASS INDEX (BMI) OF 37.0 TO 37.9 IN ADULT, UNSPECIFIED OBESITY TYPE (HCC): ICD-10-CM

## 2022-04-08 DIAGNOSIS — N18.2 STAGE 2 CHRONIC KIDNEY DISEASE: ICD-10-CM

## 2022-04-08 DIAGNOSIS — L97.528 ULCER OF LEFT FOOT WITH OTHER SEVERITY (HCC): ICD-10-CM

## 2022-04-08 DIAGNOSIS — G62.9 NEUROPATHY: ICD-10-CM

## 2022-04-08 DIAGNOSIS — E11.69 TYPE 2 DIABETES MELLITUS WITH OTHER SPECIFIED COMPLICATION, WITHOUT LONG-TERM CURRENT USE OF INSULIN (HCC): Primary | ICD-10-CM

## 2022-04-08 DIAGNOSIS — I10 ESSENTIAL HYPERTENSION: ICD-10-CM

## 2022-04-08 PROBLEM — L97.509 FOOT ULCER (HCC): Status: ACTIVE | Noted: 2022-04-08

## 2022-04-08 PROCEDURE — 99214 OFFICE O/P EST MOD 30 MIN: CPT | Performed by: INTERNAL MEDICINE

## 2022-04-08 RX ORDER — PREGABALIN 75 MG/1
75 CAPSULE ORAL
COMMUNITY
Start: 2022-04-05 | End: 2022-04-25

## 2022-04-08 RX ORDER — ROPINIROLE 0.25 MG/1
TABLET, FILM COATED ORAL
COMMUNITY
Start: 2022-01-04 | End: 2022-04-25

## 2022-04-08 RX ORDER — TADALAFIL 10 MG/1
10 TABLET ORAL
COMMUNITY
Start: 2021-11-11 | End: 2022-04-25

## 2022-04-08 RX ORDER — CLINDAMYCIN HYDROCHLORIDE 150 MG/1
CAPSULE ORAL
COMMUNITY
Start: 2022-02-17 | End: 2022-04-25

## 2022-04-08 RX ORDER — PREGABALIN 75 MG/1
CAPSULE ORAL
COMMUNITY
Start: 2022-04-05 | End: 2022-05-22

## 2022-04-08 RX ORDER — SIMVASTATIN 40 MG
1 TABLET ORAL EVERY EVENING
COMMUNITY
Start: 2021-08-27 | End: 2022-04-25

## 2022-04-08 NOTE — PROGRESS NOTES
Rell Mathew  59 y o   04/08/22      CC: f/u T2DM  Assessment and Plan     Problem List Items Addressed This Visit        Endocrine    Diabetes mellitus ([facility] [address] ) - Primary    Relevant Orders    Hemoglobin A1C With EAG    Comprehensive metabolic panel    Lipid panel       Cardiovascular and Mediastinum    Essential hypertension    Relevant Orders    Hemoglobin A1C With EAG    Comprehensive metabolic panel    Lipid panel       Nervous and Auditory    Neuropathy    Relevant Orders    Hemoglobin A1C With EAG    Comprehensive metabolic panel    Lipid panel      Other Visit Diagnoses     Ulcer of left foot with other severity ([facility] [address] )        Relevant Orders    Hemoglobin A1C With EAG    Comprehensive metabolic panel    Lipid panel         Hemoglobin A1C in the office was 6 7  Will continue him on his current regimen for now   Patient to keep us updates on this healing status of his foot ulcer  [facility] length [facility]  Will continue for now and possibly discontinue if wound is not healing appropriately   Patient understands and agrees with the plan  Plan discussed with attending- Dr Arianna Ratliff:     Patient is a 58 yo M here for T2DM follow up  Checks his glucose every morning  Before taking antibiotics for foot ulcer, glucose was 110-130  Since taking antibiotics, he notes that his glucose has been running from 130-150  He also notes that he has not been eating very healthily lately since he has been stressed out  Since last being seen in this office, he was hospitalized for a dog bite and was diagnosed with a new diabetic foot ulcer  He completed a course of Augmentin as well as doxycycline  He is following up with the podiatrist next Wednesday at the wound clinic  Current regimen includes semaglutide 0 25 mg weekly and empagliflozin 25 mg daily  Currently taking Prinzide daily  Does not want to take a statin medication  Recently started on Lyrica (ordered 3 days ago) for peripheral neuropathy  He has not picked this up yet  Review of Systems   Constitutional: Negative for chills and fever  Respiratory: Negative for cough, chest tightness and shortness of breath  Cardiovascular: Negative for chest pain and palpitations  Gastrointestinal: Negative for abdominal pain, diarrhea, nausea and vomiting  Skin: Positive for wound  Neurological: Positive for numbness (diabetic neuropathy)  Negative for dizziness and headaches          The following portions of the patient's history were reviewed and updated as appropriate:  current medications, past family history, past medical history, past social history, past surgical history and problem list     Current Outpatient Medications on File Prior to Visit   Medication Sig Dispense Refill    Alpha-Lipoic Acid 600 MG CAPS Take 600 mg by mouth 2 (two) times a day        Apple Cider Vinegar 300 MG TABS Take 300 mg by mouth daily        Ascorbic Acid (vitamin C) 1000 MG tablet Take 1,000 mg by mouth 2 (two) times a day      BENFOTIAMINE PO Take 300 mg by mouth 2 (two) times a day        beta carotene 30 MG capsule Take 30 mg by mouth 2 (two) times a day        carvedilol (COREG) 25 mg tablet Take 1 tablet (25 mg total) by mouth 2 (two) times a day with meals 60 tablet 1    Chromium Picolinate 500 MCG TABS Take by mouth daily       clindamycin (CLEOCIN) 150 mg capsule       [facility] CINNAMON PO Take by mouth 2 (two) times a day       Empagliflozin (Jardiance) 25 MG TABS Take 1 tablet (25 mg total) by mouth daily 90 tablet 3    Garlic 9351 MG CAPS Take by mouth 2 (two) times a day       glucose blood (Contour Next Test) test strip Use to check blood sugar once a day 100 strip 3    lisinopril-hydrochlorothiazide (PRINZIDE,ZESTORETIC) 20-12 5 MG per tablet Take 1 tablet by mouth 2 (two) times a day 60 tablet 3    Magnesium ([facility]S Triple Magnesium Complex) 400 MG CAPS Take by mouth daily      Misc Natural Products (Blood Sugar Balance) TABS Take by mouth 2 (two) times a day      Multiple Vitamin (MULTI VITAMIN MENS PO) Take by mouth once      Potassium 99 MG TABS Take by mouth daily       pregabalin (LYRICA) 75 mg capsule Take 75 mg by mouth      Semaglutide,0 25 or 0 5MG/DOS, (Ozempic, 0 25 or 0 5 MG/DOSE,) 2 MG/1 5ML SOPN Inject 0 25 mg under the skin once a week 15 mL 3    Turmeric (QC TUMERIC COMPLEX PO) Take 1,000 mg by mouth once Tumeric /curcimin      VITAMIN D PO Take by mouth 2 (two) times a day      vitamin E, tocopherol, 400 units capsule Take 400 Units by mouth daily      Zinc 50 MG CAPS Take by mouth 2 (two) times a day       amoxicillin-clavulanate (AUGMENTIN) 875-125 mg per tablet Take 1 tablet by mouth every 12 (twelve) hours for 10 days 20 tablet 0    Blood Glucose Monitoring Suppl (OneTouch Verio Sync System) w/Device KIT Use daily Test sugar daily in the morning  (Patient not taking: Reported on 3/8/2021) 1 kit 0    doxycycline hyclate (VIBRAMYCIN) 100 mg capsule Take 1 capsule (100 mg total) by mouth 2 (two) times a day for 10 days 20 capsule 0    glucose blood (OneTouch Verio) test strip Test sugar daily in the morning  (Patient not taking: Reported on 3/8/2021) 100 each 3    HYDROcodone-acetaminophen (NORCO) 5-325 mg per tablet Take 1 tablet by mouth every 6 (six) hours as needed for pain Max Daily Amount: 4 tablets 10 tablet 0    naloxone (NARCAN) 4 mg/0 1 mL nasal spray Administer 1 spray into a nostril  If no response after 2-3 minutes, give another dose in the other nostril using a new spray   (Patient not taking: Reported on 6/17/2021) 1 each 1    pregabalin (LYRICA) 75 mg capsule  (Patient not taking: Reported on 4/8/2022 )      rOPINIRole (REQUIP) 0 25 mg tablet  (Patient not taking: Reported on 4/8/2022 )      simvastatin (ZOCOR) 40 mg tablet Take 1 tablet by mouth every evening (Patient not taking: Reported on 4/8/2022 )      tadalafil (CIALIS) 10 MG tablet Take 10 mg by mouth (Patient not taking: Reported on 4/8/2022 )      traMADol (ULTRAM) 50 mg tablet Take 1 tablet (50 mg total) by mouth every 12 (twelve) hours as needed for moderate pain 60 tablet 0    traMADol (Ultram) 50 mg tablet Take 1 tablet (50 mg total) by mouth every 6 (six) hours as needed for moderate pain for up to 15 doses 15 tablet 0     No current facility-administered medications on file prior to visit  Objective:    /80   Pulse 89   Ht 5' 11" (1 803 m)   Wt 120 kg (264 lb)   BMI 36 82 kg/m²     Physical Exam  Constitutional:       General: He is not in acute distress  Appearance: He is not ill-appearing  HENT:      Head: Normocephalic and atraumatic  Cardiovascular:      Rate and Rhythm: Normal rate and regular rhythm  Pulmonary:      Effort: Pulmonary effort is normal  No respiratory distress  Breath sounds: No wheezing  Abdominal:      General: Abdomen is flat  There is no distension  Palpations: Abdomen is soft  Musculoskeletal:      Right lower leg: No edema  Left lower leg: No edema  Comments: Left foot in soft boot   Neurological:      General: No focal deficit present  Mental Status: He is alert and oriented to person, place, and time  Psychiatric:         Mood and Affect: Mood normal          Behavior: Behavior normal          Thought Content: Thought content normal          Judgment: Judgment normal                    Some portions of this record may have been generated with voice recognition software  There may be translation, syntax, or grammatical errors  Occasional wrong word or "sound-a-like" substitutions may have occurred due to the inherent limitations of the voice recognition software       [id number] [facility]   PGY3

## 2022-04-08 NOTE — PATIENT INSTRUCTIONS
Continue current regimen for now   Please let me know and keep me updated on healing of the foot wounds, we can accordingly make a decision on whether to continue Jardiance

## 2022-04-11 ENCOUNTER — HOSPITAL ENCOUNTER (EMERGENCY)
Facility: HOSPITAL | Age: 57
Discharge: HOME | End: 2022-04-11
Attending: EMERGENCY MEDICINE
Payer: MEDICARE

## 2022-04-11 ENCOUNTER — APPOINTMENT (EMERGENCY)
Dept: RADIOLOGY | Facility: HOSPITAL | Age: 57
End: 2022-04-11
Payer: MEDICARE

## 2022-04-11 VITALS
WEIGHT: 264.55 LBS | BODY MASS INDEX: 36.9 KG/M2 | HEART RATE: 74 BPM | RESPIRATION RATE: 18 BRPM | OXYGEN SATURATION: 98 % | SYSTOLIC BLOOD PRESSURE: 128 MMHG | DIASTOLIC BLOOD PRESSURE: 80 MMHG | TEMPERATURE: 98.4 F

## 2022-04-11 DIAGNOSIS — L97.509 DIABETIC FOOT ULCER (HCC): Primary | ICD-10-CM

## 2022-04-11 DIAGNOSIS — E11.621 DIABETIC FOOT ULCER (HCC): Primary | ICD-10-CM

## 2022-04-11 LAB
ALBUMIN SERPL BCP-MCNC: 3.7 G/DL (ref 3.5–5)
ALP SERPL-CCNC: 44 U/L (ref 46–116)
ALT SERPL W P-5'-P-CCNC: 32 U/L (ref 12–78)
ANION GAP SERPL CALCULATED.3IONS-SCNC: 6 MMOL/L (ref 4–13)
AST SERPL W P-5'-P-CCNC: 17 U/L (ref 5–45)
BASOPHILS # BLD AUTO: 0.04 THOUSANDS/ΜL (ref 0–0.1)
BASOPHILS NFR BLD AUTO: 1 % (ref 0–1)
BILIRUB SERPL-MCNC: 0.33 MG/DL (ref 0.2–1)
BUN SERPL-MCNC: 28 MG/DL (ref 5–25)
CALCIUM SERPL-MCNC: 9 MG/DL (ref 8.3–10.1)
CHLORIDE SERPL-SCNC: 102 MMOL/L (ref 100–108)
CO2 SERPL-SCNC: 27 MMOL/L (ref 21–32)
CREAT SERPL-MCNC: 1.32 MG/DL (ref 0.6–1.3)
EOSINOPHIL # BLD AUTO: 0.16 THOUSAND/ΜL (ref 0–0.61)
EOSINOPHIL NFR BLD AUTO: 3 % (ref 0–6)
ERYTHROCYTE [DISTWIDTH] IN BLOOD BY AUTOMATED COUNT: 12.7 % (ref 11.6–15.1)
GFR SERPL CREATININE-BSD FRML MDRD: 58 ML/MIN/1.73SQ M
GLUCOSE SERPL-MCNC: 117 MG/DL (ref 65–140)
HCT VFR BLD AUTO: 37.8 % (ref 36.5–49.3)
HGB BLD-MCNC: 12.6 G/DL (ref 12–17)
IMM GRANULOCYTES # BLD AUTO: 0.02 THOUSAND/UL (ref 0–0.2)
IMM GRANULOCYTES NFR BLD AUTO: 0 % (ref 0–2)
LYMPHOCYTES # BLD AUTO: 1.27 THOUSANDS/ΜL (ref 0.6–4.47)
LYMPHOCYTES NFR BLD AUTO: 25 % (ref 14–44)
MCH RBC QN AUTO: 31 PG (ref 26.8–34.3)
MCHC RBC AUTO-ENTMCNC: 33.3 G/DL (ref 31.4–37.4)
MCV RBC AUTO: 93 FL (ref 82–98)
MONOCYTES # BLD AUTO: 0.56 THOUSAND/ΜL (ref 0.17–1.22)
MONOCYTES NFR BLD AUTO: 11 % (ref 4–12)
NEUTROPHILS # BLD AUTO: 3.08 THOUSANDS/ΜL (ref 1.85–7.62)
NEUTS SEG NFR BLD AUTO: 60 % (ref 43–75)
NRBC BLD AUTO-RTO: 0 /100 WBCS
PLATELET # BLD AUTO: 223 THOUSANDS/UL (ref 149–390)
PMV BLD AUTO: 9.6 FL (ref 8.9–12.7)
POTASSIUM SERPL-SCNC: 4.7 MMOL/L (ref 3.5–5.3)
PROT SERPL-MCNC: 7.7 G/DL (ref 6.4–8.2)
RBC # BLD AUTO: 4.06 MILLION/UL (ref 3.88–5.62)
SODIUM SERPL-SCNC: 135 MMOL/L (ref 136–145)
WBC # BLD AUTO: 5.13 THOUSAND/UL (ref 4.31–10.16)

## 2022-04-11 PROCEDURE — 99284 EMERGENCY DEPT VISIT MOD MDM: CPT

## 2022-04-11 PROCEDURE — 80053 COMPREHEN METABOLIC PANEL: CPT | Performed by: PHYSICIAN ASSISTANT

## 2022-04-11 PROCEDURE — 96374 THER/PROPH/DIAG INJ IV PUSH: CPT

## 2022-04-11 PROCEDURE — 99284 EMERGENCY DEPT VISIT MOD MDM: CPT | Performed by: PHYSICIAN ASSISTANT

## 2022-04-11 PROCEDURE — 73630 X-RAY EXAM OF FOOT: CPT

## 2022-04-11 PROCEDURE — 85025 COMPLETE CBC W/AUTO DIFF WBC: CPT | Performed by: PHYSICIAN ASSISTANT

## 2022-04-11 PROCEDURE — 36415 COLL VENOUS BLD VENIPUNCTURE: CPT | Performed by: PHYSICIAN ASSISTANT

## 2022-04-11 RX ADMIN — MORPHINE SULFATE 2 MG: 2 INJECTION, SOLUTION INTRAMUSCULAR; INTRAVENOUS at 19:31

## 2022-04-12 ENCOUNTER — SURGERY/PROCEDURE (OUTPATIENT)
Dept: URBAN - METROPOLITAN AREA SURGICAL CENTER 6 | Facility: SURGICAL CENTER | Age: 57
End: 2022-04-12

## 2022-04-12 DIAGNOSIS — H53.8: ICD-10-CM

## 2022-04-12 DIAGNOSIS — T85.29XS: ICD-10-CM

## 2022-04-12 PROCEDURE — 66986 EXCHANGE LENS PROSTHESIS: CPT

## 2022-04-12 NOTE — CONSULTS
Podiatry - Consultation    Patient Information:   Dora Santillan 59 y o  male MRN: [id number]  Unit/Bed#: ED 01 Encounter: [id number]  PCP: Krista Zepeda MD  Date of Admission:  4/11/2022  Date of Consultation: 04/12/22  Requesting Physician: No att  providers found      ASSESSMENT:    Dora Santillan is a 59 y o  male with:    1  Left plantar foot ulceration  2  T2DM  3  CKD 2  4  Lumbar radiculopathy    PLAN:    · Patient stable for discharge from podiatry standpoint  · Recommend close outpatient follow-up with Dr Nguyen Lagunas, patient is scheduled for an appointment 4/13/22  · No need for additional antibiotics given that the wound is not infected and he just finished an extended course  · Local wound care consisting of xeroform, DSD  · Elevation and offloading on green foam wedges or pillows when non-ambulatory  · Will discuss this plan with my attending and update as needed  Weightbearing status: WBAT with surgical shoe    SUBJECTIVE:    History of Present Illness:    Dora Santillan is a 59 y o  male who is originally seen in the ED 4/11/2022 due to a left foot ulceration  Patient has a past medical history of T2DM, CKD 2, obesity, lumbar radiculopathy  We are consulted for evaluation of the patient's left foot  The patient states that his left foot ulceration began at the end of March  He states that he went to the ED 04/01 where his ulceration was debrided  He says that the wound has been stable since then, however he noticed malodor and a green tinge to the plantar aspect of the ulceration yesterday and decided to come to the hospital  Patient denies nausea, vomiting, chest pain, shortness of breath, chills, fever  Review of Systems:    Constitutional: Negative  HENT: Negative  Eyes: Negative  Respiratory: Negative  Cardiovascular: Negative  Gastrointestinal: Negative  Musculoskeletal: left foot pain   Skin:left lateral foot ulceration   Neurological: peripheral neuropathy   Psych: Negative  Past Medical and Surgical History:     Past Medical History:   Diagnosis Date    High blood sugar     Hypertension        Past Surgical History:   Procedure Laterality Date    HERNIA REPAIR      KIDNEY SURGERY      MOUTH SURGERY         Meds/Allergies:    (Not in a hospital admission)      Allergies   Allergen Reactions    Cephalexin Hives     Skin peeling    Metformin GI Intolerance       Social History:     Marital Status: Registered Domestic Partner    Substance Use History:   Social History     Substance and Sexual Activity   Alcohol Use Yes    Comment: ocassional     Social History     Tobacco Use   Smoking Status Never Smoker   Smokeless Tobacco Never Used     Social History     Substance and Sexual Activity   Drug Use Never       Family History:    History reviewed  No pertinent family history  OBJECTIVE:    Vitals:   Blood Pressure: 128/80 (04/11/22 2012)  Pulse: 74 (04/11/22 2012)  Temperature: 98 4 °F (36 9 °C) (04/11/22 1701)  Temp Source: Oral (04/11/22 1701)  Respirations: 18 (04/11/22 2012)  Weight - Scale: 120 kg (264 lb 8 8 oz) (04/11/22 1658)  SpO2: 98 % (04/11/22 2012)    Physical Exam:    General Appearance: Alert, cooperative, no distress  HEENT: Head normocephalic, atraumatic, without obvious abnormality  Heart: Normal rate and rhythm  Lungs: Non-labored breathing  No respiratory distress  Abdomen: Without distension  Psychiatric: AAOx3  Lower Extremity:  Vascular:   Right DP and PT pulses are present  Left DP and PT pulses are present  CRT < 3 seconds at the digits  +1/4 edema noted at bilateral lower extremities  Pedal hair is present  Skin temperature is WNL bilaterally  Musculoskeletal:  MMT is 5/5 in all muscle compartments bilaterally  ROM at the 1st MPJ and ankle joint are decreased bilaterally with the leg extended  Pain on palpation of the left lateral foot  No gross deformities noted       Dermatological:  Lower extremity wound(s) as noted below:    Wound #: 1  Location: left lateral foot  Length 2 0cm: Width 1 1cm: Depth 0 2cm: with skin bridge in between  Deepest Tissue Noted in Base: subcutaneous tissue  Probe to Bone: No  Peripheral Skin Description: Attached  Granulation: 100% Fibrotic Tissue: 0% Necrotic Tissue: 0%   Drainage Amount: minimal, serous  Signs of Infection: No      Neurological:  Gross sensation is intact  Protective sensation is diminished  Patient Reports numbness and/or paresthesias  Clinical Images 04/12/22:              Additional data:     Lab Results: I have personally reviewed pertinent labs including:    Results from last 7 days   Lab Units 04/11/22  1930   WBC Thousand/uL 5 13   HEMOGLOBIN g/dL 12 6   HEMATOCRIT % 37 8   PLATELETS Thousands/uL 223   NEUTROS PCT % 60   LYMPHS PCT % 25   MONOS PCT % 11   EOS PCT % 3     Results from last 7 days   Lab Units 04/11/22  1930   POTASSIUM mmol/L 4 7   CHLORIDE mmol/L 102   CO2 mmol/L 27   BUN mg/dL 28*   CREATININE mg/dL 1 32*   CALCIUM mg/dL 9 0   ALK PHOS U/L 44*   ALT U/L 32   AST U/L 17           Cultures: I have personally reviewed pertinent cultures including:              Imaging: I have personally reviewed pertinent reports in PACS  EKG, Pathology, and Other Studies: I have personally reviewed pertinent reports  Time Spent for Care: 30 minutes  More than 50% of total time spent on counseling and coordination of care as described above  ** Please Note: Portions of the record may have been created with voice recognition software  Occasional wrong word or "sound a like" substitutions may have occurred due to the inherent limitations of voice recognition software  Read the chart carefully and recognize, using context, where substitutions have occurred   **

## 2022-04-12 NOTE — ED PROVIDER NOTES
History  Chief Complaint   Patient presents with    Cellulitis     Recently dx with cellulitis to left foot  Also has a wound  Seen on 4/1 and d/c home with Doxycycline and Augmentin which just recently finished  Reports worsneing redness, swelling and pain  Patient presents to the ER for evaluation of left foot wound  The patient states that he was seen on 04/01/2022, 10 days ago for this wound  States that he was seen by Podiatry in the ER was started on doxycycline and Augmentin which he states he completed four days ago  States that since then he believes that the wound has gotten worse and more red  Patient states that he does have diabetes and neuropathy from his diabetes  States that his blood sugars have been well and had been between 120 and 140  Patient denies taking any medication other than his regular medication and 1 Tylenol this morning  Patient denies any fevers, weakness, increased numbness or tingling, or any other concerning symptoms  Prior to Admission Medications   Prescriptions Last Dose Informant Patient Reported? Taking? Alpha-Lipoic Acid 600 MG CAPS   Yes No   Sig: Take 600 mg by mouth 2 (two) times a day     Apple Cider Vinegar 300 MG TABS   Yes No   Sig: Take 300 mg by mouth daily     Ascorbic Acid (vitamin C) 1000 MG tablet   Yes No   Sig: Take 1,000 mg by mouth 2 (two) times a day   BENFOTIAMINE PO   Yes No   Sig: Take 300 mg by mouth 2 (two) times a day     Blood Glucose Monitoring Suppl (OneTouch Verio Sync System) w/Device KIT  Self No No   Sig: Use daily Test sugar daily in the morning     Patient not taking: Reported on 3/8/2021   [facility] CINNAMON PO  Self Yes No   Sig: Take by mouth 2 (two) times a day    Chromium Picolinate 500 MCG TABS  Self Yes No   Sig: Take by mouth daily    Empagliflozin (Jardiance) 25 MG TABS   No No   Sig: Take 1 tablet (25 mg total) by mouth daily   Garlic 3029 MG CAPS  Self Yes No   Sig: Take by mouth 2 (two) times a day HYDROcodone-acetaminophen (NORCO) 5-325 mg per tablet   No No   Sig: Take 1 tablet by mouth every 6 (six) hours as needed for pain Max Daily Amount: 4 tablets   Magnesium (CVS Triple Magnesium Complex) 400 MG CAPS   Yes No   Sig: Take by mouth daily   Misc Natural Products (Blood Sugar Balance) TABS   Yes No   Sig: Take by mouth 2 (two) times a day   Multiple Vitamin (MULTI VITAMIN MENS PO)   Yes No   Sig: Take by mouth once   Potassium 99 MG TABS  Self Yes No   Sig: Take by mouth daily    Semaglutide,0 25 or 0 5MG/DOS, (Ozempic, 0 25 or 0 5 MG/DOSE,) 2 MG/1 5ML SOPN   No No   Sig: Inject 0 25 mg under the skin once a week   Turmeric (QC TUMERIC COMPLEX PO)   Yes No   Sig: Take 1,000 mg by mouth once Tumeric /curcimin   VITAMIN D PO   Yes No   Sig: Take by mouth 2 (two) times a day   Zinc 50 MG CAPS  Self Yes No   Sig: Take by mouth 2 (two) times a day    beta carotene 30 MG capsule  Self Yes No   Sig: Take 30 mg by mouth 2 (two) times a day     carvedilol (COREG) 25 mg tablet   No No   Sig: Take 1 tablet (25 mg total) by mouth 2 (two) times a day with meals   clindamycin (CLEOCIN) 150 mg capsule   Yes No   glucose blood (Contour Next Test) test strip   No No   Sig: Use to check blood sugar once a day   glucose blood (OneTouch Verio) test strip  Self No No   Sig: Test sugar daily in the morning  Patient not taking: Reported on 3/8/2021   lisinopril-hydrochlorothiazide (PRINZIDE,ZESTORETIC) 20-12 5 MG per tablet   No No   Sig: Take 1 tablet by mouth 2 (two) times a day   naloxone (NARCAN) 4 mg/0 1 mL nasal spray   No No   Sig: Administer 1 spray into a nostril  If no response after 2-3 minutes, give another dose in the other nostril using a new spray     Patient not taking: Reported on 6/17/2021   pregabalin (LYRICA) 75 mg capsule   Yes No   Sig: Take 75 mg by mouth   pregabalin (LYRICA) 75 mg capsule   Yes No   Patient not taking: Reported on 4/8/2022    rOPINIRole (REQUIP) 0 25 mg tablet   Yes No   Patient not taking: Reported on 4/8/2022    simvastatin (ZOCOR) 40 mg tablet   Yes No   Sig: Take 1 tablet by mouth every evening   Patient not taking: Reported on 4/8/2022    tadalafil (CIALIS) 10 MG tablet   Yes No   Sig: Take 10 mg by mouth   Patient not taking: Reported on 4/8/2022    traMADol (ULTRAM) 50 mg tablet   No No   Sig: Take 1 tablet (50 mg total) by mouth every 12 (twelve) hours as needed for moderate pain   traMADol (Ultram) 50 mg tablet   No No   Sig: Take 1 tablet (50 mg total) by mouth every 6 (six) hours as needed for moderate pain for up to 15 doses   vitamin E, tocopherol, 400 units capsule  Self Yes No   Sig: Take 400 Units by mouth daily      Facility-Administered Medications: None       Past Medical History:   Diagnosis Date    High blood sugar     Hypertension        Past Surgical History:   Procedure Laterality Date    HERNIA REPAIR      KIDNEY SURGERY      MOUTH SURGERY         History reviewed  No pertinent family history  I have reviewed and agree with the history as documented  E-Cigarette/Vaping    E-Cigarette Use Never User      E-Cigarette/Vaping Substances    Nicotine No     THC No     CBD No     Flavoring No     Other No     Unknown No      Social History     Tobacco Use    Smoking status: Never Smoker    Smokeless tobacco: Never Used   Vaping Use    Vaping Use: Never used   Substance Use Topics    Alcohol use: Yes     Comment: ocassional    Drug use: Never       Review of Systems   Constitutional: Negative for chills and fever  HENT: Negative for congestion and sore throat  Respiratory: Negative for cough and shortness of breath  Cardiovascular: Negative for chest pain  Gastrointestinal: Negative for abdominal pain, diarrhea, nausea and vomiting  Genitourinary: Negative for dysuria  Musculoskeletal: Negative for back pain  Skin: Positive for wound  Negative for rash  Neurological: Negative for headaches     All other systems reviewed and are negative  Physical Exam  Physical Exam  Constitutional:       General: He is not in acute distress  Appearance: He is well-developed  HENT:      Head: Normocephalic and atraumatic  Nose: Nose normal    Eyes:      Conjunctiva/sclera: Conjunctivae normal    Cardiovascular:      Rate and Rhythm: Normal rate  Pulses: Normal pulses  Comments: 2+ pedal pulses  Pulmonary:      Effort: Pulmonary effort is normal    Musculoskeletal:         General: Normal range of motion  Cervical back: Normal range of motion  Skin:     General: Skin is warm  Capillary Refill: Capillary refill takes less than 2 seconds  Comments: Wound noted to lateral left foot  Minimal surrounding erythema  See pictures below  Neurological:      Mental Status: He is alert and oriented to person, place, and time           Vital Signs  ED Triage Vitals   Temperature Pulse Respirations Blood Pressure SpO2   04/11/22 1701 04/11/22 1701 04/11/22 1701 04/11/22 1701 04/11/22 1701   98 4 °F (36 9 °C) 90 18 131/78 98 %      Temp Source Heart Rate Source Patient Position - Orthostatic VS BP Location FiO2 (%)   04/11/22 1701 04/11/22 2012 04/11/22 1701 04/11/22 1701 --   Oral Monitor Sitting Right arm       Pain Score       04/11/22 1701       No Pain           Vitals:    04/11/22 1701 04/11/22 2012   BP: 131/78 128/80   Pulse: 90 74   Patient Position - Orthostatic VS: Sitting Lying         Visual Acuity      ED Medications  Medications   morphine injection 2 mg (2 mg Intravenous Given 4/11/22 1931)       Diagnostic Studies  Results Reviewed     Procedure Component Value Units Date/Time    Comprehensive metabolic panel [[id number]]  (Abnormal) Collected: 04/11/22 1930    Lab Status: Final result Specimen: Blood from Arm, Right Updated: 04/11/22 2018     Sodium 135 mmol/L      Potassium 4 7 mmol/L      Chloride 102 mmol/L      CO2 27 mmol/L      ANION GAP 6 mmol/L      BUN 28 mg/dL      Creatinine 1 32 mg/dL      Glucose 117 mg/dL      Calcium 9 0 mg/dL      AST 17 U/L      ALT 32 U/L      Alkaline Phosphatase 44 U/L      Total Protein 7 7 g/dL      Albumin 3 7 g/dL      Total Bilirubin 0 33 mg/dL      eGFR 58 ml/min/1 73sq m     Narrative:      [place] guidelines for Chronic Kidney Disease (CKD):     Stage 1 with normal or high GFR (GFR > 90 mL/min/1 73 square meters)    Stage 2 Mild CKD (GFR = 60-89 mL/min/1 73 square meters)    Stage 3A Moderate CKD (GFR = 45-59 mL/min/1 73 square meters)    Stage 3B Moderate CKD (GFR = 30-44 mL/min/1 73 square meters)    Stage 4 Severe CKD (GFR = 15-29 mL/min/1 73 square meters)    Stage 5 End Stage CKD (GFR <15 mL/min/1 73 square meters)  Note: GFR calculation is accurate only with a steady state creatinine    CBC and differential [[id number]] Collected: 04/11/22 1930    Lab Status: Final result Specimen: Blood from Arm, Right Updated: 04/11/22 1944     WBC 5 13 Thousand/uL      RBC 4 06 Million/uL      Hemoglobin 12 6 g/dL      Hematocrit 37 8 %      MCV 93 fL      MCH 31 0 pg      MCHC 33 3 g/dL      RDW 12 7 %      MPV 9 6 fL      Platelets 012 Thousands/uL      nRBC 0 /100 WBCs      Neutrophils Relative 60 %      Immat GRANS % 0 %      Lymphocytes Relative 25 %      Monocytes Relative 11 %      Eosinophils Relative 3 %      Basophils Relative 1 %      Neutrophils Absolute 3 08 Thousands/µL      Immature Grans Absolute 0 02 Thousand/uL      Lymphocytes Absolute 1 27 Thousands/µL      Monocytes Absolute 0 56 Thousand/µL      Eosinophils Absolute 0 16 Thousand/µL      Basophils Absolute 0 04 Thousands/µL                  XR foot 3+ views LEFT    (Results Pending)              Procedures  Procedures         ED Course  ED Course as of 04/11/22 2109 Mon Apr 11, 2022 1901 Blood Pressure: 131/78   1901 Temperature: 98 4 °F (36 9 °C)   1901 Pulse: 90   1901 Respirations: 18   1901 SpO2: 98 %   2002 WBC: 5 13   2002 Hemoglobin: 12 6   2008 Podiatry will come see patient in ED                               SBIRT 22yo+      Most Recent Value   SBIRT (24 yo +)    In order to provide better care to our patients, we are screening all of our patients for alcohol and drug use  Would it be okay to ask you these screening questions? Unable to answer at this time Filed at: 04/11/2022 2011                    MDM     Patient well appearing in the ER  Reviewed with podiatry who will come see patient in the ED  Patient signed out to Paulette Lawrence, awaiting podiatry consult  Disposition  Final diagnoses:   Diabetic foot ulcer ([address] )     Time reflects when diagnosis was documented in both MDM as applicable and the Disposition within this note     Time User Action Codes Description Comment    4/11/2022  9:08 PM Damon Muñiz Add [E11 [id number],  [id number]] Diabetic foot ulcer [facility])       ED Disposition     None      Follow-up Information    None         Patient's Medications   Discharge Prescriptions    No medications on file       No discharge procedures on file      PDMP Review       Value Time User    PDMP Reviewed  Yes 4/2/2022 12:08 AM Damaris Barraza PA-C          ED Provider  Electronically Signed by           Chris Rod PA-C  04/11/22 3568

## 2022-04-12 NOTE — DISCHARGE INSTRUCTIONS
Return to the ER with any new or worsening of symptoms     Watch for any signs of infection such as but not limited to redness, warmth, swelling surrounding the area, drainage from wound, red streaks extending from wound, fevers, or any other concerning symptoms  Return to the ER immediately if any of these develop  Thank you for allowing us to be part of your care today

## 2022-04-13 ENCOUNTER — 1 DAY POST-OP (OUTPATIENT)
Dept: URBAN - METROPOLITAN AREA CLINIC 6 | Facility: CLINIC | Age: 57
End: 2022-04-13

## 2022-04-13 DIAGNOSIS — Z96.1: ICD-10-CM

## 2022-04-13 PROCEDURE — 99024 POSTOP FOLLOW-UP VISIT: CPT

## 2022-04-13 ASSESSMENT — TONOMETRY
OS_IOP_MMHG: 12
OD_IOP_MMHG: 17

## 2022-04-13 ASSESSMENT — VISUAL ACUITY
OD_SC: 20/70-1
OD_PH: 20/70+1

## 2022-04-14 ASSESSMENT — KERATOMETRY
OS_AXISANGLE2_DEGREES: 90
OS_K2POWER_DIOPTERS: 38.50
OS_K1POWER_DIOPTERS: 38.50
OS_AXISANGLE_DEGREES: 180

## 2022-04-19 NOTE — ED PROVIDER NOTES
History  Chief Complaint   Patient presents with    Foot Pain     L foot pain with wound to bottom of foot  pt is UTD on tetnus      Patient with wound on left lateral foot  Some redness onto top of foot  No fevers  Patient does not have any known trauma  Does have hx of DM  There has been a small amount of drainage from the area which looked like a blister and then popped and drained  No radiation of pain  Has been progressing over past several days  No other associated symptoms  Prior to Admission Medications   Prescriptions Last Dose Informant Patient Reported? Taking? Alpha-Lipoic Acid 600 MG CAPS   Yes No   Sig: Take 600 mg by mouth 2 (two) times a day     Apple Cider Vinegar 300 MG TABS   Yes No   Sig: Take 300 mg by mouth daily     Ascorbic Acid (vitamin C) 1000 MG tablet   Yes No   Sig: Take 1,000 mg by mouth 2 (two) times a day   BENFOTIAMINE PO   Yes No   Sig: Take 300 mg by mouth 2 (two) times a day     Blood Glucose Monitoring Suppl (OneTouch Verio Sync System) w/Device KIT  Self No No   Sig: Use daily Test sugar daily in the morning     Patient not taking: Reported on 3/8/2021   [facility]S CINNAMON PO  Self Yes No   Sig: Take by mouth 2 (two) times a day    Chromium Picolinate 500 MCG TABS  Self Yes No   Sig: Take by mouth daily    Empagliflozin (Jardiance) 25 MG TABS   No No   Sig: Take 1 tablet (25 mg total) by mouth daily   Garlic 1385 MG CAPS  Self Yes No   Sig: Take by mouth 2 (two) times a day    Magnesium (CVS Triple Magnesium Complex) 400 MG CAPS   Yes No   Sig: Take by mouth daily   Misc Natural Products (Blood Sugar Balance) TABS   Yes No   Sig: Take by mouth 2 (two) times a day   Multiple Vitamin (MULTI VITAMIN MENS PO)   Yes No   Sig: Take by mouth once   Potassium 99 MG TABS  Self Yes No   Sig: Take by mouth daily    Semaglutide,0 25 or 0 5MG/DOS, (Ozempic, 0 25 or 0 5 MG/DOSE,) 2 MG/1 5ML SOPN   No No   Sig: Inject 0 25 mg under the skin once a week   Turmeric (QC TUMERIC COMPLEX PO) Yes No   Sig: Take 1,000 mg by mouth once Tumeric /curcimin   VITAMIN D PO   Yes No   Sig: Take by mouth 2 (two) times a day   Zinc 50 MG CAPS  Self Yes No   Sig: Take by mouth 2 (two) times a day    beta carotene 30 MG capsule  Self Yes No   Sig: Take 30 mg by mouth 2 (two) times a day     carvedilol (COREG) 25 mg tablet   No No   Sig: Take 1 tablet (25 mg total) by mouth 2 (two) times a day with meals   glucose blood (Contour Next Test) test strip   No No   Sig: Use to check blood sugar once a day   glucose blood (OneTouch Verio) test strip  Self No No   Sig: Test sugar daily in the morning  Patient not taking: Reported on 3/8/2021   lisinopril-hydrochlorothiazide (PRINZIDE,ZESTORETIC) 20-12 5 MG per tablet   No No   Sig: Take 1 tablet by mouth 2 (two) times a day   naloxone (NARCAN) 4 mg/0 1 mL nasal spray   No No   Sig: Administer 1 spray into a nostril  If no response after 2-3 minutes, give another dose in the other nostril using a new spray  Patient not taking: Reported on 6/17/2021   traMADol (ULTRAM) 50 mg tablet   No No   Sig: Take 1 tablet (50 mg total) by mouth every 12 (twelve) hours as needed for moderate pain   vitamin E, tocopherol, 400 units capsule  Self Yes No   Sig: Take 400 Units by mouth daily      Facility-Administered Medications: None       Past Medical History:   Diagnosis Date    High blood sugar     Hypertension        Past Surgical History:   Procedure Laterality Date    HERNIA REPAIR      KIDNEY SURGERY      MOUTH SURGERY         No family history on file  I have reviewed and agree with the history as documented  E-Cigarette/Vaping    E-Cigarette Use Never User      E-Cigarette/Vaping Substances    Nicotine No     THC No     CBD No     Flavoring No     Other No     Unknown No      Social History     Tobacco Use    Smoking status: Never Smoker    Smokeless tobacco: Never Used   Vaping Use    Vaping Use: Never used   Substance Use Topics    Alcohol use:  Yes Comment: ocassional    Drug use: Never       Review of Systems   Constitutional: Negative for chills and fever  HENT: Negative for congestion  Respiratory: Negative for chest tightness and shortness of breath  Musculoskeletal: Negative for joint swelling and myalgias  Skin: Positive for color change and wound  Negative for pallor and rash  Neurological: Negative for weakness and numbness  Hematological: Does not bruise/bleed easily  All other systems reviewed and are negative  Physical Exam  Physical Exam  Vitals and nursing note reviewed  Constitutional:       Appearance: Normal appearance  He is well-developed  HENT:      Head: Normocephalic and atraumatic  Eyes:      General: No scleral icterus  Conjunctiva/sclera: Conjunctivae normal    Cardiovascular:      Rate and Rhythm: Normal rate and regular rhythm  Pulmonary:      Effort: Pulmonary effort is normal  No respiratory distress  Musculoskeletal:         General: Tenderness present  No deformity  Skin:     General: Skin is warm and dry  Findings: Erythema present  No rash  Comments: Patient with quarter-sized wound over lateral aspect of left 5th MTP joint  Some erythema over top of foot as well  No visible FB  It is draining some purulent material   Neurological:      Mental Status: He is alert           Vital Signs  ED Triage Vitals [03/29/22 2015]   Temperature Pulse Respirations Blood Pressure SpO2   98 4 °F (36 9 °C) 99 18 145/90 97 %      Temp Source Heart Rate Source Patient Position - Orthostatic VS BP Location FiO2 (%)   Oral Monitor Sitting Right arm --      Pain Score       9           Vitals:    03/29/22 2015 03/30/22 0012   BP: 145/90 133/83   Pulse: 99 83   Patient Position - Orthostatic VS: Sitting Sitting         Visual Acuity      ED Medications  Medications   ampicillin-sulbactam (UNASYN) 3 g in sodium chloride 0 9 % 100 mL IVPB (0 g Intravenous Stopped 3/29/22 7698)   doxycycline hyclate (VIBRAMYCIN) capsule 100 mg (100 mg Oral Given 3/29/22 2154)   traMADol (ULTRAM) tablet 50 mg (50 mg Oral Given 3/30/22 0018)       Diagnostic Studies  Results Reviewed     Procedure Component Value Units Date/Time    Blood culture #1 [[id number]] Collected: 03/29/22 2136    Lab Status: Final result Specimen: Blood from Arm, Right Updated: 04/04/22 0001     Blood Culture No Growth After 5 Days  Blood culture #2 [[id number]] Collected: 03/29/22 2128    Lab Status: Final result Specimen: Blood from Arm, Left Updated: 04/04/22 0001     Blood Culture No Growth After 5 Days      Wound culture and Gram stain [[id number]]  (Abnormal)  (Susceptibility) Collected: 03/29/22 2141    Lab Status: Final result Specimen: Wound from Foot, Left Updated: 04/01/22 0811     Wound Culture 2+ Growth of Escherichia coli      2+ Growth of      Gram Stain Result 1+ Polys      No bacteria seen    Susceptibility     Escherichia coli (1)     Antibiotic Interpretation Method Status    Amoxicillin + Clavulanate Susceptible LEDA Final    Ampicillin ($$) Resistant LEDA Final    Ampicillin + Sulbactam ($) Intermediate LEDA Final    Aztreonam ($$$)  Susceptible LEDA Final    Cefazolin ($) Susceptible LEDA Final    Ciprofloxacin ($)  Susceptible LEDA Final    Ertapenem ($$$) Susceptible LEDA Final    Gentamicin ($$) Susceptible LEDA Final    Levofloxacin ($) Susceptible LEDA Final    Piperacillin + Tazobactam ($$$) Susceptible LEDA Final    Tetracycline Susceptible LEDA Final    Tobramycin ($) Susceptible LEDA Final    Trimethoprim + Sulfamethoxazole ($$$) Susceptible LEDA Final                   Procalcitonin [[id number]]  (Abnormal) Collected: 03/29/22 2128    Lab Status: Final result Specimen: Blood from Arm, Left Updated: 03/29/22 2209     Procalcitonin 0 41 ng/ml     Comprehensive metabolic panel [[id number]]  (Abnormal) Collected: 03/29/22 2128    Lab Status: Final result Specimen: Blood from Arm, Left Updated: 03/29/22 2208     Sodium 137 mmol/L Potassium 4 8 mmol/L      Chloride 103 mmol/L      CO2 25 mmol/L      ANION GAP 9 mmol/L      BUN 36 mg/dL      Creatinine 1 67 mg/dL      Glucose 175 mg/dL      Calcium 9 1 mg/dL      AST 15 U/L      ALT 32 U/L      Alkaline Phosphatase 59 U/L      Total Protein 8 6 g/dL      Albumin 3 9 g/dL      Total Bilirubin 0 39 mg/dL      eGFR 44 ml/min/1 73sq m     Narrative:      [place] guidelines for Chronic Kidney Disease (CKD):     Stage 1 with normal or high GFR (GFR > 90 mL/min/1 73 square meters)    Stage 2 Mild CKD (GFR = 60-89 mL/min/1 73 square meters)    Stage 3A Moderate CKD (GFR = 45-59 mL/min/1 73 square meters)    Stage 3B Moderate CKD (GFR = 30-44 mL/min/1 73 square meters)    Stage 4 Severe CKD (GFR = 15-29 mL/min/1 73 square meters)    Stage 5 End Stage CKD (GFR <15 mL/min/1 73 square meters)  Note: GFR calculation is accurate only with a steady state creatinine    Lactic acid [[id number]]  (Normal) Collected: 03/29/22 2130    Lab Status: Final result Specimen: Blood from Arm, Left Updated: 03/29/22 2201     LACTIC ACID 0 7 mmol/L     Narrative:      Result may be elevated if tourniquet was used during collection      Protime-INR [[id number]]  (Normal) Collected: 03/29/22 2128    Lab Status: Final result Specimen: Blood from Arm, Left Updated: 03/29/22 2155     Protime 14 0 seconds      INR 1 11    APTT [[id number]]  (Normal) Collected: 03/29/22 2128    Lab Status: Final result Specimen: Blood from Arm, Left Updated: 03/29/22 2155     PTT 32 seconds     CBC and differential [[id number]] Collected: 03/29/22 2128    Lab Status: Final result Specimen: Blood from Arm, Left Updated: 03/29/22 2143     WBC 4 50 Thousand/uL      RBC 4 23 Million/uL      Hemoglobin 13 5 g/dL      Hematocrit 40 1 %      MCV 95 fL      MCH 31 9 pg      MCHC 33 7 g/dL      RDW 12 9 %      MPV 9 9 fL      Platelets 344 Thousands/uL      nRBC 0 /100 WBCs      Neutrophils Relative 66 %      Immat GRANS % 0 % Lymphocytes Relative 21 %      Monocytes Relative 11 %      Eosinophils Relative 2 %      Basophils Relative 0 %      Neutrophils Absolute 2 89 Thousands/µL      Immature Grans Absolute 0 01 Thousand/uL      Lymphocytes Absolute 0 96 Thousands/µL      Monocytes Absolute 0 51 Thousand/µL      Eosinophils Absolute 0 11 Thousand/µL      Basophils Absolute 0 02 Thousands/µL                  XR foot 3+ views LEFT   ED Interpretation by Wes Kong MD (03/29 2322)   No subQ air  No evidence of osteomyelitis  Final Result by Wiley Cabezas MD (03/30 0476)   Heel spurs      No evidence of deep space infection or osteomyelitis            Workstation performed: [id number]                    Procedures  Procedures         ED Course  ED Course as of 04/19/22 1120   Tue Mar 29, 2022   2321 Creat baseline 1 58-1  71    2324 At this time patient does not meet sepsis criteria  Creatinine is at baseline  Procalcitonin is elevated  Patient's preference is to go home if possible  He got a 1st dose of Unasyn while in the emergency department and covered with doxycycline  Will send home with p o  Antibiotics  Close follow-up  Wed Mar 30, 2022   0011 Patient states that he cannot take clindamycin due to stomach upset  Will place him on Augmentin and doxycycline  MDM    Disposition  Final diagnoses:   Diabetic ulcer of left foot ([address] )   Cellulitis of left foot     Time reflects when diagnosis was documented in both MDM as applicable and the Disposition within this note     Time User Action Codes Description Comment    3/30/2022 12:12 AM Dion Quevedo [I93 [id number],  L97 [id number]] Diabetic ulcer of left foot ([address] )     3/30/2022 12:12 AM Dion Quevedo [L03 [id number]] Cellulitis of left foot       ED Disposition     ED Disposition Condition Date/Time Comment    Discharge Stable Wed Mar 30, 2022 12:12 AM Dora Santillan discharge to home/self care  Follow-up Information     Follow up With Specialties Details Why Contact Info    Liliana Robert MD Family Medicine Call on 3/31/2022 to schedule follow-up for recheck as soon as possible (ideally within 3 days) [address] [address] [phone]            Discharge Medication List as of 3/30/2022 12:16 AM      START taking these medications    Details   amoxicillin-clavulanate (AUGMENTIN) 875-125 mg per tablet Take 1 tablet by mouth every 12 (twelve) hours for 10 days, Starting Wed 3/30/2022, Until Sat 4/9/2022, Normal      doxycycline hyclate (VIBRAMYCIN) 100 mg capsule Take 1 capsule (100 mg total) by mouth 2 (two) times a day for 10 days, Starting Wed 3/30/2022, Until Sat 4/9/2022, Normal      !! traMADol (Ultram) 50 mg tablet Take 1 tablet (50 mg total) by mouth every 6 (six) hours as needed for moderate pain for up to 15 doses, Starting Wed 3/30/2022, Normal       !! - Potential duplicate medications found  Please discuss with provider        CONTINUE these medications which have NOT CHANGED    Details   Alpha-Lipoic Acid 600 MG CAPS Take 600 mg by mouth 2 (two) times a day  , Historical Med      Apple Cider Vinegar 300 MG TABS Take 300 mg by mouth daily  , Historical Med      Ascorbic Acid (vitamin C) 1000 MG tablet Take 1,000 mg by mouth 2 (two) times a day, Historical Med      BENFOTIAMINE PO Take 300 mg by mouth 2 (two) times a day  , Historical Med      beta carotene 30 MG capsule Take 30 mg by mouth 2 (two) times a day  , Historical Med      Blood Glucose Monitoring Suppl (OneTouch Verio Sync System) w/Device KIT Use daily Test sugar daily in the morning , Starting Mon 3/1/2021, Normal      carvedilol (COREG) 25 mg tablet Take 1 tablet (25 mg total) by mouth 2 (two) times a day with meals, Starting Mon 6/14/2021, Normal      Chromium Picolinate 500 MCG TABS Take by mouth daily , Historical Med      !! [facility] CINNAMON PO Take by mouth 2 (two) times a day , Historical Med Empagliflozin (Jardiance) 25 MG TABS Take 1 tablet (25 mg total) by mouth daily, Starting Tue 11/2/2021, Normal      Garlic 0411 MG CAPS Take by mouth 2 (two) times a day , Historical Med      !! glucose blood (Contour Next Test) test strip Use to check blood sugar once a day, Normal      !! glucose blood (OneTouch Verio) test strip Test sugar daily in the morning , Normal      lisinopril-hydrochlorothiazide (PRINZIDE,ZESTORETIC) 20-12 5 MG per tablet Take 1 tablet by mouth 2 (two) times a day, Starting Wed 7/14/2021, Normal      Magnesium (CVS Triple Magnesium Complex) 400 MG CAPS Take by mouth daily, Historical Med      Misc Natural Products (Blood Sugar Balance) TABS Take by mouth 2 (two) times a day, Historical Med      Multiple Vitamin (MULTI VITAMIN MENS PO) Take by mouth once, Historical Med      naloxone (NARCAN) 4 mg/0 1 mL nasal spray Administer 1 spray into a nostril   If no response after 2-3 minutes, give another dose in the other nostril using a new spray , Normal      Potassium 99 MG TABS Take by mouth daily , Historical Med      Semaglutide,0 25 or 0 5MG/DOS, (Ozempic, 0 25 or 0 5 MG/DOSE,) 2 MG/1 5ML SOPN Inject 0 25 mg under the skin once a week, Starting Tue 11/2/2021, Normal      !! traMADol (ULTRAM) 50 mg tablet Take 1 tablet (50 mg total) by mouth every 12 (twelve) hours as needed for moderate pain, Starting Fri 7/23/2021, Normal      Turmeric (QC TUMERIC COMPLEX PO) Take 1,000 mg by mouth once Tumeric /curcimin, Historical Med      VITAMIN D PO Take by mouth 2 (two) times a day, Historical Med      vitamin E, tocopherol, 400 units capsule Take 400 Units by mouth daily, Historical Med      Zinc 50 MG CAPS Take by mouth 2 (two) times a day , Historical Med      Calcium Carbonate-Vit D-Min (Calcium 1200) 0581-5084 MG-UNIT CHEW Chew daily , Historical Med      !! CINNAMON PO Take by mouth, Historical Med      diazepam (VALIUM) 5 mg tablet Take 1 tablet 30 minutes prior to MRI and may take additional tablet immediately prior to MRI as needed for anxiety, Normal      gabapentin (NEURONTIN) 300 mg capsule Take 1 capsule (300 mg total) by mouth 3 (three) times a day, Starting Tue 9/21/2021, Normal      Magnesium-Calcium-Folic Acid (MAGNEBIND 800 PO) Take by mouth daily , Historical Med      methocarbamol (ROBAXIN) 750 mg tablet Take 1 tablet (750 mg total) by mouth every 6 (six) hours as needed for muscle spasms, Starting Fri 3/26/2021, Normal       !! - Potential duplicate medications found  Please discuss with provider  No discharge procedures on file      PDMP Review       Value Time User    PDMP Reviewed  Yes 4/2/2022 12:08 AM Casimiro Escamilla PA-C          ED Provider  Electronically Signed by           Rodrick Boast, MD  04/19/22 6620

## 2022-04-20 ENCOUNTER — 1 WEEK POST-OP (OUTPATIENT)
Dept: URBAN - METROPOLITAN AREA CLINIC 6 | Facility: CLINIC | Age: 57
End: 2022-04-20

## 2022-04-20 DIAGNOSIS — Z96.1: ICD-10-CM

## 2022-04-20 PROCEDURE — 99024 POSTOP FOLLOW-UP VISIT: CPT

## 2022-04-20 ASSESSMENT — KERATOMETRY
OS_AXISANGLE2_DEGREES: 90
OS_K1POWER_DIOPTERS: 38.50
OS_K2POWER_DIOPTERS: 38.50
OS_AXISANGLE_DEGREES: 180

## 2022-04-20 ASSESSMENT — VISUAL ACUITY
OS_SC: 20/20+2
OD_OTHER: 1 WEEK POST OP
OD_PH: 20/30+2
OD_SC: 20/50+2

## 2022-04-20 ASSESSMENT — TONOMETRY
OD_IOP_MMHG: 10
OS_IOP_MMHG: 11

## 2022-04-22 ENCOUNTER — PROBLEM (OUTPATIENT)
Dept: URBAN - METROPOLITAN AREA CLINIC 6 | Facility: CLINIC | Age: 57
End: 2022-04-22

## 2022-04-22 DIAGNOSIS — H57.11: ICD-10-CM

## 2022-04-22 DIAGNOSIS — Z96.1: ICD-10-CM

## 2022-04-22 PROCEDURE — 99024 POSTOP FOLLOW-UP VISIT: CPT

## 2022-04-22 ASSESSMENT — TONOMETRY
OS_IOP_MMHG: 15
OD_IOP_MMHG: 12
OD_IOP_MMHG: 12

## 2022-04-22 ASSESSMENT — VISUAL ACUITY
OD_PH: 20/30
OS_SC: 20/30
OD_SC: 20/40+2

## 2022-04-25 ENCOUNTER — HOSPITAL ENCOUNTER (INPATIENT)
Facility: HOSPITAL | Age: 57
LOS: 7 days | Discharge: HOME HEALTH | DRG: 623 | End: 2022-05-02
Attending: EMERGENCY MEDICINE | Admitting: INTERNAL MEDICINE
Payer: MEDICARE

## 2022-04-25 ENCOUNTER — APPOINTMENT (EMERGENCY)
Dept: RADIOLOGY | Facility: HOSPITAL | Age: 57
DRG: 623 | End: 2022-04-25
Payer: MEDICARE

## 2022-04-25 DIAGNOSIS — E11.621 DIABETIC FOOT ULCER (HCC): ICD-10-CM

## 2022-04-25 DIAGNOSIS — L97.509 DIABETIC FOOT ULCER (HCC): ICD-10-CM

## 2022-04-25 DIAGNOSIS — T14.8XXA INFECTED WOUND: ICD-10-CM

## 2022-04-25 DIAGNOSIS — N18.9 CKD (CHRONIC KIDNEY DISEASE): ICD-10-CM

## 2022-04-25 DIAGNOSIS — L03.90 CELLULITIS: ICD-10-CM

## 2022-04-25 DIAGNOSIS — L08.9 INFECTED WOUND: ICD-10-CM

## 2022-04-25 DIAGNOSIS — E11.621 DIABETIC ULCER OF LEFT FOOT (HCC): Primary | ICD-10-CM

## 2022-04-25 DIAGNOSIS — L97.529 DIABETIC ULCER OF LEFT FOOT (HCC): Primary | ICD-10-CM

## 2022-04-25 PROBLEM — N18.30 CHRONIC KIDNEY DISEASE, STAGE 3 (HCC): Status: ACTIVE | Noted: 2022-02-22

## 2022-04-25 LAB
ANION GAP SERPL CALCULATED.3IONS-SCNC: 10 MMOL/L (ref 4–13)
ATRIAL RATE: 73 BPM
BASOPHILS # BLD AUTO: 0.03 THOUSANDS/ΜL (ref 0–0.1)
BASOPHILS NFR BLD AUTO: 1 % (ref 0–1)
BUN SERPL-MCNC: 31 MG/DL (ref 5–25)
CALCIUM SERPL-MCNC: 8.7 MG/DL (ref 8.3–10.1)
CHLORIDE SERPL-SCNC: 99 MMOL/L (ref 100–108)
CO2 SERPL-SCNC: 26 MMOL/L (ref 21–32)
CREAT SERPL-MCNC: 1.69 MG/DL (ref 0.6–1.3)
EOSINOPHIL # BLD AUTO: 0.17 THOUSAND/ΜL (ref 0–0.61)
EOSINOPHIL NFR BLD AUTO: 4 % (ref 0–6)
ERYTHROCYTE [DISTWIDTH] IN BLOOD BY AUTOMATED COUNT: 12.7 % (ref 11.6–15.1)
ERYTHROCYTE [SEDIMENTATION RATE] IN BLOOD: 64 MM/HOUR (ref 0–19)
FLUAV RNA RESP QL NAA+PROBE: NEGATIVE
FLUBV RNA RESP QL NAA+PROBE: NEGATIVE
GFR SERPL CREATININE-BSD FRML MDRD: 43 ML/MIN/1.73SQ M
GLUCOSE SERPL-MCNC: 126 MG/DL (ref 65–140)
GLUCOSE SERPL-MCNC: 162 MG/DL (ref 65–140)
GLUCOSE SERPL-MCNC: 216 MG/DL (ref 65–140)
HCT VFR BLD AUTO: 38.8 % (ref 36.5–49.3)
HGB BLD-MCNC: 12.7 G/DL (ref 12–17)
IMM GRANULOCYTES # BLD AUTO: 0 THOUSAND/UL (ref 0–0.2)
IMM GRANULOCYTES NFR BLD AUTO: 0 % (ref 0–2)
LYMPHOCYTES # BLD AUTO: 0.91 THOUSANDS/ΜL (ref 0.6–4.47)
LYMPHOCYTES NFR BLD AUTO: 19 % (ref 14–44)
MCH RBC QN AUTO: 31.5 PG (ref 26.8–34.3)
MCHC RBC AUTO-ENTMCNC: 32.7 G/DL (ref 31.4–37.4)
MCV RBC AUTO: 96 FL (ref 82–98)
MONOCYTES # BLD AUTO: 0.65 THOUSAND/ΜL (ref 0.17–1.22)
MONOCYTES NFR BLD AUTO: 14 % (ref 4–12)
NEUTROPHILS # BLD AUTO: 3.01 THOUSANDS/ΜL (ref 1.85–7.62)
NEUTS SEG NFR BLD AUTO: 62 % (ref 43–75)
NRBC BLD AUTO-RTO: 0 /100 WBCS
P AXIS: 30 DEGREES
PLATELET # BLD AUTO: 180 THOUSANDS/UL (ref 149–390)
PLATELET # BLD AUTO: 189 THOUSANDS/UL (ref 149–390)
PMV BLD AUTO: 10.2 FL (ref 8.9–12.7)
PMV BLD AUTO: 10.3 FL (ref 8.9–12.7)
POTASSIUM SERPL-SCNC: 4.5 MMOL/L (ref 3.5–5.3)
PR INTERVAL: 194 MS
QRS AXIS: 10 DEGREES
QRSD INTERVAL: 88 MS
QT INTERVAL: 360 MS
QTC INTERVAL: 396 MS
RBC # BLD AUTO: 4.03 MILLION/UL (ref 3.88–5.62)
RSV RNA RESP QL NAA+PROBE: NEGATIVE
SARS-COV-2 RNA RESP QL NAA+PROBE: NEGATIVE
SODIUM SERPL-SCNC: 135 MMOL/L (ref 136–145)
T WAVE AXIS: 8 DEGREES
VENTRICULAR RATE: 73 BPM
WBC # BLD AUTO: 4.77 THOUSAND/UL (ref 4.31–10.16)

## 2022-04-25 PROCEDURE — 99285 EMERGENCY DEPT VISIT HI MDM: CPT | Performed by: PHYSICIAN ASSISTANT

## 2022-04-25 PROCEDURE — 93005 ELECTROCARDIOGRAM TRACING: CPT

## 2022-04-25 PROCEDURE — 99223 1ST HOSP IP/OBS HIGH 75: CPT | Performed by: INTERNAL MEDICINE

## 2022-04-25 PROCEDURE — 99285 EMERGENCY DEPT VISIT HI MDM: CPT

## 2022-04-25 PROCEDURE — 85652 RBC SED RATE AUTOMATED: CPT | Performed by: STUDENT IN AN ORGANIZED HEALTH CARE EDUCATION/TRAINING PROGRAM

## 2022-04-25 PROCEDURE — 0241U HB NFCT DS VIR RESP RNA 4 TRGT: CPT | Performed by: INTERNAL MEDICINE

## 2022-04-25 PROCEDURE — 96374 THER/PROPH/DIAG INJ IV PUSH: CPT

## 2022-04-25 PROCEDURE — 82948 REAGENT STRIP/BLOOD GLUCOSE: CPT

## 2022-04-25 PROCEDURE — 80048 BASIC METABOLIC PNL TOTAL CA: CPT | Performed by: PHYSICIAN ASSISTANT

## 2022-04-25 PROCEDURE — 73630 X-RAY EXAM OF FOOT: CPT

## 2022-04-25 PROCEDURE — 85049 AUTOMATED PLATELET COUNT: CPT | Performed by: INTERNAL MEDICINE

## 2022-04-25 PROCEDURE — 87040 BLOOD CULTURE FOR BACTERIA: CPT | Performed by: STUDENT IN AN ORGANIZED HEALTH CARE EDUCATION/TRAINING PROGRAM

## 2022-04-25 PROCEDURE — 36415 COLL VENOUS BLD VENIPUNCTURE: CPT | Performed by: PHYSICIAN ASSISTANT

## 2022-04-25 PROCEDURE — 85025 COMPLETE CBC W/AUTO DIFF WBC: CPT | Performed by: PHYSICIAN ASSISTANT

## 2022-04-25 PROCEDURE — 93010 ELECTROCARDIOGRAM REPORT: CPT | Performed by: INTERNAL MEDICINE

## 2022-04-25 RX ORDER — GENTAMICIN SULFATE 3 MG/ML
1 SOLUTION/ DROPS OPHTHALMIC 2 TIMES DAILY
COMMUNITY

## 2022-04-25 RX ORDER — PREGABALIN 75 MG/1
75 CAPSULE ORAL DAILY
Status: DISCONTINUED | OUTPATIENT
Start: 2022-04-25 | End: 2022-05-02 | Stop reason: HOSPADM

## 2022-04-25 RX ORDER — ASCORBIC ACID 500 MG
1000 TABLET ORAL 2 TIMES DAILY
Status: DISCONTINUED | OUTPATIENT
Start: 2022-04-25 | End: 2022-04-26

## 2022-04-25 RX ORDER — LISINOPRIL 20 MG/1
20 TABLET ORAL DAILY
Status: DISCONTINUED | OUTPATIENT
Start: 2022-04-25 | End: 2022-04-26

## 2022-04-25 RX ORDER — HEPARIN SODIUM 5000 [USP'U]/ML
5000 INJECTION, SOLUTION INTRAVENOUS; SUBCUTANEOUS EVERY 8 HOURS SCHEDULED
Status: DISCONTINUED | OUTPATIENT
Start: 2022-04-25 | End: 2022-04-26

## 2022-04-25 RX ORDER — ONDANSETRON 2 MG/ML
4 INJECTION INTRAMUSCULAR; INTRAVENOUS EVERY 6 HOURS PRN
Status: DISCONTINUED | OUTPATIENT
Start: 2022-04-25 | End: 2022-05-02 | Stop reason: HOSPADM

## 2022-04-25 RX ORDER — SODIUM CHLORIDE 9 MG/ML
50 INJECTION, SOLUTION INTRAVENOUS CONTINUOUS
Status: DISCONTINUED | OUTPATIENT
Start: 2022-04-25 | End: 2022-04-26

## 2022-04-25 RX ORDER — ZINC SULFATE 50(220)MG
220 CAPSULE ORAL DAILY
Status: DISCONTINUED | OUTPATIENT
Start: 2022-04-26 | End: 2022-04-26

## 2022-04-25 RX ORDER — CLINDAMYCIN PHOSPHATE 150 MG/ML
600 INJECTION, SOLUTION INTRAVENOUS EVERY 8 HOURS
Status: DISCONTINUED | OUTPATIENT
Start: 2022-04-25 | End: 2022-04-25

## 2022-04-25 RX ORDER — CLINDAMYCIN PHOSPHATE 600 MG/50ML
600 INJECTION INTRAVENOUS ONCE
Status: DISCONTINUED | OUTPATIENT
Start: 2022-04-25 | End: 2022-04-25

## 2022-04-25 RX ORDER — ACETAMINOPHEN 325 MG/1
650 TABLET ORAL EVERY 6 HOURS PRN
Status: DISCONTINUED | OUTPATIENT
Start: 2022-04-25 | End: 2022-05-02 | Stop reason: HOSPADM

## 2022-04-25 RX ORDER — PERPHENAZINE 16 MG
600 TABLET ORAL 2 TIMES DAILY
Status: DISCONTINUED | OUTPATIENT
Start: 2022-04-25 | End: 2022-04-25

## 2022-04-25 RX ORDER — DOXYCYCLINE HYCLATE 100 MG/1
100 CAPSULE ORAL EVERY 12 HOURS SCHEDULED
Status: DISCONTINUED | OUTPATIENT
Start: 2022-04-25 | End: 2022-04-30

## 2022-04-25 RX ORDER — METRONIDAZOLE 500 MG/1
500 TABLET ORAL ONCE
Status: COMPLETED | OUTPATIENT
Start: 2022-04-25 | End: 2022-04-25

## 2022-04-25 RX ORDER — GENTAMICIN SULFATE 3 MG/ML
1 SOLUTION/ DROPS OPHTHALMIC 2 TIMES DAILY
Status: DISCONTINUED | OUTPATIENT
Start: 2022-04-25 | End: 2022-05-02 | Stop reason: HOSPADM

## 2022-04-25 RX ORDER — MORPHINE SULFATE 4 MG/ML
4 INJECTION, SOLUTION INTRAMUSCULAR; INTRAVENOUS ONCE
Status: COMPLETED | OUTPATIENT
Start: 2022-04-25 | End: 2022-04-25

## 2022-04-25 RX ORDER — HYDROMORPHONE HCL/PF 1 MG/ML
0.5 SYRINGE (ML) INJECTION EVERY 4 HOURS PRN
Status: DISCONTINUED | OUTPATIENT
Start: 2022-04-25 | End: 2022-05-02 | Stop reason: HOSPADM

## 2022-04-25 RX ORDER — CARVEDILOL 25 MG/1
25 TABLET ORAL 2 TIMES DAILY WITH MEALS
Status: DISCONTINUED | OUTPATIENT
Start: 2022-04-25 | End: 2022-05-02 | Stop reason: HOSPADM

## 2022-04-25 RX ADMIN — MORPHINE SULFATE 2 MG: 2 INJECTION, SOLUTION INTRAMUSCULAR; INTRAVENOUS at 09:43

## 2022-04-25 RX ADMIN — DOXYCYCLINE 100 MG: 100 INJECTION, POWDER, LYOPHILIZED, FOR SOLUTION INTRAVENOUS at 12:45

## 2022-04-25 RX ADMIN — CARVEDILOL 25 MG: 25 TABLET, FILM COATED ORAL at 21:07

## 2022-04-25 RX ADMIN — HYDROMORPHONE HYDROCHLORIDE 0.5 MG: 1 INJECTION, SOLUTION INTRAMUSCULAR; INTRAVENOUS; SUBCUTANEOUS at 17:02

## 2022-04-25 RX ADMIN — METRONIDAZOLE 500 MG: 500 TABLET ORAL at 12:44

## 2022-04-25 RX ADMIN — AMPICILLIN SODIUM AND SULBACTAM SODIUM 3 G: 10; 5 INJECTION, POWDER, FOR SOLUTION INTRAVENOUS at 21:13

## 2022-04-25 RX ADMIN — GENTAMICIN SULFATE 1 DROP: 3 SOLUTION/ DROPS OPHTHALMIC at 21:09

## 2022-04-25 RX ADMIN — SODIUM CHLORIDE 50 ML/HR: 0.9 INJECTION, SOLUTION INTRAVENOUS at 14:35

## 2022-04-25 RX ADMIN — EMPAGLIFLOZIN 25 MG: 25 TABLET, FILM COATED ORAL at 21:10

## 2022-04-25 RX ADMIN — SODIUM CHLORIDE 50 ML/HR: 0.9 INJECTION, SOLUTION INTRAVENOUS at 21:13

## 2022-04-25 RX ADMIN — MORPHINE SULFATE 4 MG: 4 INJECTION INTRAVENOUS at 11:29

## 2022-04-25 RX ADMIN — HYDROMORPHONE HYDROCHLORIDE 0.5 MG: 1 INJECTION, SOLUTION INTRAMUSCULAR; INTRAVENOUS; SUBCUTANEOUS at 21:13

## 2022-04-25 RX ADMIN — ONDANSETRON 4 MG: 2 INJECTION INTRAMUSCULAR; INTRAVENOUS at 17:18

## 2022-04-25 RX ADMIN — AMPICILLIN SODIUM AND SULBACTAM SODIUM 3 G: 10; 5 INJECTION, POWDER, FOR SOLUTION INTRAVENOUS at 15:20

## 2022-04-25 NOTE — ASSESSMENT & PLAN NOTE
Lab Results   Component Value Date    EGFR 43 04/25/2022    EGFR 58 04/11/2022    EGFR 47 04/01/2022    CREATININE 1 69 (H) 04/25/2022    CREATININE 1 32 (H) 04/11/2022    CREATININE 1 57 (H) 04/01/2022   baseline creatinine is 1 4-1 6     Currently 1 69  Will monitor closely and check bmp in am

## 2022-04-25 NOTE — H&P
[facility]  H&P- Corrie Jeong 1965, 59 y o  male MRN: [id number]  Unit/Bed#: ED 15 Encounter: [id number]  Primary Care Provider: Kirsty Loyd MD   Date and time admitted to hospital: 4/25/2022  9:16 AM    * Diabetic foot ulcer ([facility] )  Assessment & Plan  Lab Results   Component Value Date    HGBA1C 6 4 (H) 11/11/2021       No results for input(s): POCGLU in the last 72 hours  Blood Sugar Average: Last 72 hrs:   with purulent drainage and surrounding cellulitis  Appreciate podiatry recommendations  Given clindamycin and flagyl in ed  Will continue clindamycin and consider gram negative coverage as diabetic ulcer  Will add levaquin  Consult Id as pt failed outpt antibiotics consisting of doxy and augmentin  Possible osteomyelitis  Podiatry request mri  Will order ekg for pre op clearance  Chronic kidney disease, stage 3 [facility])  Assessment & Plan  Lab Results   Component Value Date    EGFR 43 04/25/2022    EGFR 58 04/11/2022    EGFR 47 04/01/2022    CREATININE 1 69 (H) 04/25/2022    CREATININE 1 32 (H) 04/11/2022    CREATININE 1 57 (H) 04/01/2022   baseline creatinine is 1 4-1 6  Currently 1 69  Will monitor closely and check bmp in am      Class 2 severe obesity with serious comorbidity and body mass index (BMI) of 37 0 to 37 9 in adult [facility])  Assessment & Plan  Encourage diet and weight loss    Diabetes mellitus ([facility] )  Assessment & Plan  Lab Results   Component Value Date    HGBA1C 6 4 (H) 11/11/2021       No results for input(s): POCGLU in the last 72 hours  Blood Sugar Average: Last 72 hrs:  a1c at goal  Pt declines insulin  Will not start insulin sliding scale  He wants to remain on jardiance  He has brought this medication in from home  Had a long discussion with pt about the risk of continuing jardiance with possible requirement of surgery and if creatinine worsens  Pt wishes to continue jardiance at this time despite those risks   He is currently stable and no surgical procedure planned  Will continue it but will stop 24 hours prior to surgery or if creatinine worsens      Essential hypertension  Assessment & Plan  Hold hctz  Write for lisinopril with hold parameters  Continue coreg         VTE Prophylaxis: Heparin  / sequential compression device   Code Status: full code    Anticipated Length of Stay:  Patient will be admitted on an Inpatient basis with an anticipated length of stay of  Greater than 2 midnights  Chief Complaint:   Worsening foot wound    History of Present Illness:    Romaine Dietz is a 59 y o  male with pmhx of type 2 diabetes, htn, ckd3, and left foot wound  He has been following with Dr Miguel Gandhi for infected left foot wound that was present since 3/29  He was treated with doxy and augmentin and completed the full course  He noticed increase redness, pain and purulent drainage yesterday  He was also having intermittent pain radiating up his leg  He has been taking tylenol with minimal relief  He was evaluated by podiatry who started on him on clindamycin and flagyl in the ed and asked for admission by our service  He will be scheduled for mri  Pt has a history of diabetes in which he had "difficulties with metformin and insulin in the past " He was upset that he didn't get jardiance last time he was in the hospital and he brought the medication with him  Discussed in full about the risk of hypoglycemia, worsening creatinine levels with continuing po diabetic medications  Pt reports that he has been stable and has taken this medication with other surgeries  He also refuses to take insulin in the hospital  He wants to assume the risk of taking jardiance in the hospital  Did explain that it will be held prior to surgery or if creatinine level worsens past baseline  He denies cardiac issues   He sometimes gets pain that is referred from his neck to his shoulder but no chest pain or shortness of breath with ambulation more than a city block or up a flight of stairs  He denies f/c no n/v/d no abd pain    Review of Systems:    Review of Systems   Constitutional: Positive for activity change  Negative for chills, diaphoresis, fatigue and fever  HENT: Negative  Eyes: Negative  Respiratory: Negative  Cardiovascular: Negative  Negative for chest pain  Gastrointestinal: Negative  Endocrine: Negative  Musculoskeletal: Negative  Skin: Positive for color change and wound  Allergic/Immunologic: Negative  Neurological: Negative  Hematological: Negative  Psychiatric/Behavioral: Negative  Past Medical and Surgical History:     Past Medical History:   Diagnosis Date    H/O eye surgery     High blood sugar     Hypertension        Past Surgical History:   Procedure Laterality Date    HERNIA REPAIR      KIDNEY SURGERY      MOUTH SURGERY         Meds/Allergies:    Prior to Admission medications    Medication Sig Start Date End Date Taking?  Authorizing Provider   Alpha-Lipoic Acid 600 MG CAPS Take 600 mg by mouth 2 (two) times a day     Yes Historical Provider, MD   Apple Cider Vinegar 300 MG TABS Take 300 mg by mouth daily     Yes Historical Provider, MD   Ascorbic Acid (vitamin C) 1000 MG tablet Take 1,000 mg by mouth 2 (two) times a day   Yes Historical Provider, MD   BENFOTIAMINE PO Take 300 mg by mouth 2 (two) times a day     Yes Historical Provider, MD   beta carotene 30 MG capsule Take 30 mg by mouth 2 (two) times a day     Yes Historical Provider, MD   carvedilol (COREG) 25 mg tablet Take 1 tablet (25 mg total) by mouth 2 (two) times a day with meals 6/14/21  Yes JAN Renteria   Chromium Picolinate 500 MCG TABS Take by mouth daily    Yes Historical Provider, MD   [employer]S CINNAMON PO Take by mouth 2 (two) times a day    Yes Historical Provider, MD   Empagliflozin (Jardiance) 25 MG TABS Take 1 tablet (25 mg total) by mouth daily 11/2/21  Yes Marsha Newman MD   Garlic 9408 MG CAPS Take by mouth 2 (two) times a day Yes Historical Provider, MD   gentamicin (GARAMYCIN) 0 3 % ophthalmic solution 1 drop 2 (two) times a day Right eye   Yes Historical Provider, MD   lisinopril-hydrochlorothiazide (PRINZIDE,ZESTORETIC) 20-12 5 MG per tablet Take 1 tablet by mouth 2 (two) times a day 7/14/21  Yes JAN Urrutia   Loteprednol Etabonate 1 % SUSP Apply to eye 2 (two) times a day I drop right eye   Yes Historical Provider, MD   Magnesium (CVS Triple Magnesium Complex) 400 MG CAPS Take by mouth daily   Yes Historical Provider, MD   Multiple Vitamin (MULTI VITAMIN MENS PO) Take by mouth once   Yes Historical Provider, MD   Potassium 99 MG TABS Take by mouth daily    Yes Historical Provider, MD   pregabalin (LYRICA) 75 mg capsule   4/5/22  Yes Historical Provider, MD   Semaglutide,0 25 or 0 5MG/DOS, (Ozempic, 0 25 or 0 5 MG/DOSE,) 2 MG/1 5ML SOPN Inject 0 25 mg under the skin once a week 11/2/21  Yes Katherne Apley, MD   Turmeric (QC TUMERIC COMPLEX PO) Take 1,000 mg by mouth once Tumeric /curcimin   Yes Historical Provider, MD   VITAMIN D PO Take by mouth 2 (two) times a day   Yes Historical Provider, MD   vitamin E, tocopherol, 400 units capsule Take 400 Units by mouth daily   Yes Historical Provider, MD   Zinc 50 MG CAPS Take by mouth 2 (two) times a day    Yes Historical Provider, MD   pregabalin (LYRICA) 75 mg capsule Take 75 mg by mouth 4/5/22 4/25/22 Yes Historical Provider, MD   Blood Glucose Monitoring Suppl (OneTouch Verio Sync System) w/Device KIT Use daily Test sugar daily in the morning  Patient not taking: Reported on 3/8/2021 3/1/21   JAN Urrutia   glucose blood (Contour Next Test) test strip Use to check blood sugar once a day 3/16/22   Katherne Apley, MD   glucose blood (OneTouch Verio) test strip Test sugar daily in the morning    Patient not taking: Reported on 3/8/2021 3/1/21   JAN Urrutia   Misc Natural Products (Blood Sugar Balance) TABS Take by mouth 2 (two) times a day    Historical Provider, MD   clindamycin (CLEOCIN) 150 mg capsule  2/17/22 4/25/22  Historical Provider, MD   HYDROcodone-acetaminophen (NORCO) 5-325 mg per tablet Take 1 tablet by mouth every 6 (six) hours as needed for pain Max Daily Amount: 4 tablets 4/2/22 4/25/22  Annia Morgan PA-C   naloxone [facility]) 4 mg/0 1 mL nasal spray Administer 1 spray into a nostril  If no response after 2-3 minutes, give another dose in the other nostril using a new spray  Patient not taking: Reported on 6/17/2021 5/19/21 4/25/22  JAN Hooper   rOPINIRole (REQUIP) 0 25 mg tablet  1/4/22 4/25/22  Historical Provider, MD   simvastatin (ZOCOR) 40 mg tablet Take 1 tablet by mouth every evening  Patient not taking: Reported on 4/8/2022 8/27/21 4/25/22  Historical Provider, MD   tadalafil (CIALIS) 10 MG tablet Take 10 mg by mouth  Patient not taking: Reported on 4/8/2022 11/11/21 4/25/22  Historical Provider, MD   traMADol [place]) 50 mg tablet Take 1 tablet (50 mg total) by mouth every 12 (twelve) hours as needed for moderate pain 7/23/21 4/25/22  JAN Hooper   traMADol (Ultram) 50 mg tablet Take 1 tablet (50 mg total) by mouth every 6 (six) hours as needed for moderate pain for up to 15 doses 3/30/22 4/25/22  Carlos Alberto Thompson MD     I have reviewed home medications with patient personally  Allergies:    Allergies   Allergen Reactions    Cephalexin Hives     Skin peeling    Metformin GI Intolerance       Social History:     Marital Status: Registered Domestic Partner     Substance Use History:   Social History     Substance and Sexual Activity   Alcohol Use Yes    Comment: ocassional     Social History     Tobacco Use   Smoking Status Never Smoker   Smokeless Tobacco Never Used     Social History     Substance and Sexual Activity   Drug Use Never       Family History:    non-contributory    Physical Exam:     Vitals:   Blood Pressure: 137/84 (04/25/22 1140)  Pulse: 79 (04/25/22 1140)  Temperature: 98 9 °F (37 2 °C) (04/25/22 0915)  Temp Source: Oral (04/25/22 0915)  Respirations: 18 (04/25/22 1140)  Height: 5' 11" (180 3 cm) (04/25/22 0915)  Weight - Scale: 123 kg (271 lb 9 7 oz) (04/25/22 0915)  SpO2: 98 % (04/25/22 1140)    Physical Exam  Constitutional:       Appearance: Normal appearance  HENT:      Head: Normocephalic and atraumatic  Eyes:      Extraocular Movements: Extraocular movements intact  Pupils: Pupils are equal, round, and reactive to light  Cardiovascular:      Rate and Rhythm: Normal rate and regular rhythm  Heart sounds: No murmur heard  No friction rub  No gallop  Pulmonary:      Effort: Pulmonary effort is normal  No respiratory distress  Breath sounds: Normal breath sounds  No wheezing or rales  Abdominal:      General: Bowel sounds are normal  There is no distension  Palpations: Abdomen is soft  Tenderness: There is no abdominal tenderness  There is no guarding  Musculoskeletal:      Right lower leg: No edema  Left lower leg: No edema  Skin:     Comments: Bandage in place left lower ext   Neurological:      Mental Status: He is alert and oriented to person, place, and time  Additional Data:     Lab Results: I have personally reviewed pertinent reports  Results from last 7 days   Lab Units 04/25/22  0941   WBC Thousand/uL 4 77   HEMOGLOBIN g/dL 12 7   HEMATOCRIT % 38 8   PLATELETS Thousands/uL 180   NEUTROS PCT % 62   LYMPHS PCT % 19   MONOS PCT % 14*   EOS PCT % 4     Results from last 7 days   Lab Units 04/25/22  0941   POTASSIUM mmol/L 4 5   CHLORIDE mmol/L 99*   CO2 mmol/L 26   BUN mg/dL 31*   CREATININE mg/dL 1 69*   CALCIUM mg/dL 8 7           Imaging: I have personally reviewed pertinent reports  XR foot 3+ views LEFT    Result Date: 4/25/2022  Narrative: LEFT FOOT INDICATION:   left foot diabetic wound at base of 5th toe  COMPARISON:  4/11/2022 VIEWS:  XR FOOT 3+ VW LEFT Images: 3 FINDINGS: There is no acute fracture or dislocation   Degenerative changes first MTP joint Calcaneal spurs No lytic or blastic osseous lesion  Vascular calcifications     Impression: No acute osseous abnormality  Degenerative changes  Heel spurs No evidence of osteomyelitis Workstation performed: [id number]     EKG, Pathology, and Other Studies Reviewed on Admission:   · EKG: pending    Epic / Care Everywhere Records Reviewed:  Yes

## 2022-04-25 NOTE — PLAN OF CARE
Problem: Potential for Falls  Goal: Patient will remain free of falls  Description: INTERVENTIONS:  - Educate patient/family on patient safety including physical limitations  - Instruct patient to call for assistance with activity   - Consult OT/PT to assist with strengthening/mobility   - Keep Call bell within reach  - Keep bed low and locked with side rails adjusted as appropriate  - Keep care items and personal belongings within reach  - Initiate and maintain comfort rounds  - Make Fall Risk Sign visible to staff  - Offer Toileting every  Hours, in advance of need  - Initiate/Maintain alarm  - Obtain necessary fall risk management equipment:   - Apply yellow socks and bracelet for high fall risk patients  - Consider moving patient to room near nurses station  Outcome: Progressing     Problem: PAIN - ADULT  Goal: Verbalizes/displays adequate comfort level or baseline comfort level  Description: Interventions:  - Encourage patient to monitor pain and request assistance  - Assess pain using appropriate pain scale  - Administer analgesics based on type and severity of pain and evaluate response  - Implement non-pharmacological measures as appropriate and evaluate response  - Consider cultural and social influences on pain and pain management  - Notify physician/advanced practitioner if interventions unsuccessful or patient reports new pain  Outcome: Progressing     Problem: INFECTION - ADULT  Goal: Absence or prevention of progression during hospitalization  Description: INTERVENTIONS:  - Assess and monitor for signs and symptoms of infection  - Monitor lab/diagnostic results  - Monitor all insertion sites, i e  indwelling lines, tubes, and drains  - Monitor endotracheal if appropriate and nasal secretions for changes in amount and color  - [place] appropriate cooling/warming therapies per order  - Administer medications as ordered  - Instruct and encourage patient and family to use good hand hygiene technique  - Identify and instruct in appropriate isolation precautions for identified infection/condition  Outcome: Progressing  Goal: Absence of fever/infection during neutropenic period  Description: INTERVENTIONS:  - Monitor WBC    Outcome: Progressing     Problem: SAFETY ADULT  Goal: Patient will remain free of falls  Description: INTERVENTIONS:  - Educate patient/family on patient safety including physical limitations  - Instruct patient to call for assistance with activity   - Consult OT/PT to assist with strengthening/mobility   - Keep Call bell within reach  - Keep bed low and locked with side rails adjusted as appropriate  - Keep care items and personal belongings within reach  - Initiate and maintain comfort rounds  - Make Fall Risk Sign visible to staff  - Offer Toileting every  Hours, in advance of need  - Initiate/Maintain alarm  - Obtain necessary fall risk management equipment:   - Apply yellow socks and bracelet for high fall risk patients  - Consider moving patient to room near nurses station  Outcome: Progressing  Goal: Maintain or return to baseline ADL function  Description: INTERVENTIONS:  -  Assess patient's ability to carry out ADLs; assess patient's baseline for ADL function and identify physical deficits which impact ability to perform ADLs (bathing, care of mouth/teeth, toileting, grooming, dressing, etc )  - Assess/evaluate cause of self-care deficits   - Assess range of motion  - Assess patient's mobility; develop plan if impaired  - Assess patient's need for assistive devices and provide as appropriate  - Encourage maximum independence but intervene and supervise when necessary  - Involve family in performance of ADLs  - Assess for home care needs following discharge   - Consider OT consult to assist with ADL evaluation and planning for discharge  - Provide patient education as appropriate  Outcome: Progressing  Goal: Maintains/Returns to pre admission functional level  Description: INTERVENTIONS:  - Perform BMAT or MOVE assessment daily    - Set and communicate daily mobility goal to care team and patient/family/caregiver  - Collaborate with rehabilitation services on mobility goals if consulted  - Perform Range of Motion times a day  - Reposition patient every  hours  - Dangle patient  times a day  - Stand patient  times a day  - Ambulate patient times a day  - Out of bed to chair  times a day   - Out of bed for meals times a day  - Out of bed for toileting  - Record patient progress and toleration of activity level   Outcome: Progressing     Problem: DISCHARGE PLANNING  Goal: Discharge to home or other facility with appropriate resources  Description: INTERVENTIONS:  - Identify barriers to discharge w/patient and caregiver  - Arrange for needed discharge resources and transportation as appropriate  - Identify discharge learning needs (meds, wound care, etc )  - Arrange for interpretive services to assist at discharge as needed  - Refer to Case Management Department for coordinating discharge planning if the patient needs post-hospital services based on physician/advanced practitioner order or complex needs related to functional status, cognitive ability, or social support system  Outcome: Progressing     Problem: Knowledge Deficit  Goal: Patient/family/caregiver demonstrates understanding of disease process, treatment plan, medications, and discharge instructions  Description: Complete learning assessment and assess knowledge base    Interventions:  - Provide teaching at level of understanding  - Provide teaching via preferred learning methods  Outcome: Progressing

## 2022-04-25 NOTE — CONSULTS
Consultation - Infectious Disease   Kate Dickinson 59 y o  male MRN: [id number]  Unit/Bed#: ED 15 Encounter: [id number]      IMPRESSION & RECOMMENDATIONS:   1  Infected diabetic foot ulcer-persistent despite outpatient oral antibiotics  Outpatient wound culture grew an E coli and mixed skin mendez  Fortunately is not systemically ill, hemodynamically stable, nontoxic  His antibiotics are limited because he has had a severe rash with cephalosporins, however he tolerates penicillins without difficulty   -discontinue clindamycin and levofloxacin  -begin Unasyn 3 g IV q 6 hours  -begin doxycycline 100 mg p o  Q 12 hours  -check MRI of the foot  -recheck CBC with diff and BMP  -local wound care  -close podiatry follow-up    2  Diabetes mellitus-type 2  Without long-term insulin use    3  Chronic kidney disease-stage III  Creatinine has bumped a bit over baseline  -recheck BMP  -dose adjusted antibiotics as needed  -volume management    4  Obesity-with a body mass index of greater than 37  Certainly a risk factor for complicating infection and relapse   -stress weight loss through diet and exercise    Have discussed the above management plan in detail with the primary service    Extensive review of the medical records in epic including review of the notes, radiographs, and laboratory results     HISTORY OF PRESENT ILLNESS:  Reason for Consult:  Diabetic foot infection  HPI: Kate Dickinson is a 59y o  year old male with diabetes mellitus, chronic kidney disease, hypertension, and chronic left foot wound who we are asked to assist with antibiotics for a left diabetic foot infection  Patient apparently developed this foot wound more than 3 weeks ago  He was treated with local wound care and a course of doxycycline and Augmentin  He had no clearance of the infection or wound healing but was stable until the day prior to admission with the patient developed purulent drainage from the foot    He also developed some increasing pain   He therefore came to the emergency department for further evaluation  The patient had blood cultures obtained and has been started on clindamycin and levofloxacin is now admitted for further management  He denies any trauma to the area, denies any fever but does admit to intermittent chills  He denies any drenching sweats, denies any nausea vomiting or diarrhea, denies any dysuria or hematuria, denies any other rash or skin lesions  REVIEW OF SYSTEMS:  A complete review of systems is negative other than that noted in the HPI  PAST MEDICAL HISTORY:  Past Medical History:   Diagnosis Date    H/O eye surgery     High blood sugar     Hypertension      Past Surgical History:   Procedure Laterality Date    HERNIA REPAIR      KIDNEY SURGERY      MOUTH SURGERY         FAMILY HISTORY:  Non-contributory    SOCIAL HISTORY:  Social History   Social History     Substance and Sexual Activity   Alcohol Use Yes    Comment: ocassional     Social History     Substance and Sexual Activity   Drug Use Never     Social History     Tobacco Use   Smoking Status Never Smoker   Smokeless Tobacco Never Used       ALLERGIES:  Allergies   Allergen Reactions    Cephalexin Hives     Skin peeling    Metformin GI Intolerance       MEDICATIONS:  All current active medications have been reviewed    Antibiotics:  Clindamycin 1, levofloxacin 1    PHYSICAL EXAM:  Temp:  [98 9 °F (37 2 °C)] 98 9 °F (37 2 °C)  HR:  [79-88] 79  Resp:  [18] 18  BP: (134-137)/(82-84) 137/84  SpO2:  [97 %-98 %] 98 %  Temp (24hrs), Av 9 °F (37 2 °C), Min:98 9 °F (37 2 °C), Max:98 9 °F (37 2 °C)  Current: Temperature: 98 9 °F (37 2 °C)  No intake or output data in the 24 hours ending 22 1406    General Appearance:  Appearing well, nontoxic, and in no distress   Head:  Normocephalic, without obvious abnormality, atraumatic   Eyes:  Conjunctiva pink and sclera anicteric, both eyes   Nose: Nares normal, mucosa normal, no drainage   Throat: Oropharynx moist without lesions   Neck: Supple, symmetrical, no adenopathy, no tenderness/mass/nodules   Back:   Symmetric, no curvature, ROM normal, no CVA tenderness   Lungs:   Clear to auscultation bilaterally, respirations unlabored   Chest Wall:  No tenderness or deformity   Heart:  RRR; no murmur, rub or gallop   Abdomen:   Soft, non-tender, non-distended, positive bowel sounds    Extremities: No cyanosis, clubbing  Left foot dressed with a dry dressing in place  Reviewed images in epic with notable necrotic wound lateral aspect of the left foot  Skin: No rashes or lesions  No draining wounds noted  Lymph nodes: Cervical, supraclavicular nodes normal   Neurologic: Alert and oriented times 3, extremity strength 5/5 and symmetric       LABS, IMAGING, & OTHER STUDIES:  Lab Results:  I have personally reviewed pertinent labs    Results from last 7 days   Lab Units 04/25/22  0941   WBC Thousand/uL 4 77   HEMOGLOBIN g/dL 12 7   PLATELETS Thousands/uL 180     Results from last 7 days   Lab Units 04/25/22  0941   SODIUM mmol/L 135*   POTASSIUM mmol/L 4 5   CHLORIDE mmol/L 99*   CO2 mmol/L 26   BUN mg/dL 31*   CREATININE mg/dL 1 69*   EGFR ml/min/1 73sq m 43   CALCIUM mg/dL 8 7                           Imaging Studies:     X-ray left foot-no acute osseous abnormality

## 2022-04-25 NOTE — ASSESSMENT & PLAN NOTE
Lab Results   Component Value Date    HGBA1C 6 4 (H) 11/11/2021       No results for input(s): POCGLU in the last 72 hours  Blood Sugar Average: Last 72 hrs:   with purulent drainage and surrounding cellulitis  Appreciate podiatry recommendations  Given clindamycin and flagyl in ed  Will continue clindamycin and consider gram negative coverage as diabetic ulcer  Will add levaquin  Consult Id as pt failed outpt antibiotics consisting of doxy and augmentin  Possible osteomyelitis  Podiatry request mri  Will order ekg for pre op clearance

## 2022-04-25 NOTE — CONSULTS
Podiatry - Consultation    Patient Information:   Tobi Lewis 59 y o  male MRN: [id number]  Unit/Bed#: ED 15 Encounter: [id number]  PCP: Juan Dunne MD  Date of Admission:  4/25/2022  Date of Consultation: 04/25/22  Requesting Physician: Rhett Burgess DO      ASSESSMENT:    Tobi Lewis is a 59 y o  male with:    1  Left plantar foot ulceration  2  Left 3rd interdigital space abrasion  3  T2DM  4  CKD 2  5  Lumbar radiculopathy    PLAN:    · No urgent need for podiatric surgical intervention  · Recommend admission for IV antibiotics and possible surgical intervention pending MRI  · F/u MRI  · XR reviewed: no MIS or acute evidence of cortical erosion to suggest osteomyelitis  · Would recommend IV clindamycin, PO flagyl given history of cephalosporin allergy  · Local wound care consisting of betadine, DSD  Wound care instructions placed  Epidermis was removed from from the surrounding ulceration revealing bleeding dermis, subcutaneous tissue with no purulence noted  · Continue to trend labs, vitals  · Elevation and offloading on green foam wedges or pillows when non-ambulatory  · Rest of care per primary team   · Will discuss this plan with my attending and update as needed  Weightbearing status: NWB to LLE    SUBJECTIVE:    History of Present Illness:    Tobi Lewis is a 59 y o  male who is originally admitted 4/25/2022 due to worsening left foot ulceration  Patient has a past medical history of T2DM, CKD 3, Obesity  We are consulted for evaluation and management of the patient's left foot  The patient has been seen multiple times at the [facility] ED for his left foot  On previous visits, his left foot has been stable and without local signs of infection  The patient states that he saw his outpatient podiatrist "about a week and a half ago" and was given wound care instructions   He states that he has been applying collagen to his ulceration daily and "trying to stay off his foot as much as possible " He does endorse having multiple cats and dogs at home, which he has to take care of  He states that yesterday, after showering, he noticed worsening appearance of his left foot and also noted malodor  This made him present to the ED today  He does endorse chills with severe pain, but denies all other systemic symptoms  Review of Systems:    Constitutional: Negative  HENT: Negative  Eyes: Negative  Respiratory: Negative  Cardiovascular: Negative  Gastrointestinal: Negative  Musculoskeletal: L foot pain   Skin:L lateral foot ulcer, L 3rd interdigital space abrasion   Neurological: peripheral neuropathy   Psych: Negative  Past Medical and Surgical History:     Past Medical History:   Diagnosis Date    H/O eye surgery     High blood sugar     Hypertension        Past Surgical History:   Procedure Laterality Date    HERNIA REPAIR      KIDNEY SURGERY      MOUTH SURGERY         Meds/Allergies:    (Not in a hospital admission)      Allergies   Allergen Reactions    Cephalexin Hives     Skin peeling    Metformin GI Intolerance       Social History:     Marital Status: Registered Domestic Partner    Substance Use History:   Social History     Substance and Sexual Activity   Alcohol Use Yes    Comment: ocassional     Social History     Tobacco Use   Smoking Status Never Smoker   Smokeless Tobacco Never Used     Social History     Substance and Sexual Activity   Drug Use Never       Family History:    No family history on file  OBJECTIVE:    Vitals:   Blood Pressure: 134/82 (04/25/22 0914)  Pulse: 88 (04/25/22 0914)  Temperature: 98 9 °F (37 2 °C) (04/25/22 0915)  Temp Source: Oral (04/25/22 0915)  Respirations: 18 (04/25/22 0914)  Weight - Scale: 123 kg (271 lb 9 7 oz) (04/25/22 0915)  SpO2: 97 % (04/25/22 0914)    Physical Exam:    General Appearance: Alert, cooperative, no distress  HEENT: Head normocephalic, atraumatic, without obvious abnormality    Heart: Normal rate and rhythm  Lungs: Non-labored breathing  No respiratory distress  Abdomen: Without distension  Psychiatric: AAOx3  Lower Extremity:  Vascular:   Right DP and PT pulses are present  Left DP and PT pulses are present  CRT < 3 seconds at the digits  +1/4 edema noted at LLE in comparison to RLE  Pedal hair is absent  Skin temperature is WNL bilaterally  Musculoskeletal:  MMT is 5/5 in all muscle compartments bilaterally  ROM at the 1st MPJ and ankle joint are decreased bilaterally with the leg extended  No Pain on palpation of the left foot  No gross deformities noted  Dermatological:  Lower extremity wound(s) as noted below:    Wound #: 1  Location: left lateral 5th MT  Length 4 0cm: Width 3 4cm: Depth 0 4:   Deepest Tissue Noted in Base: subcutaneous tissue  Probe to Bone: No  Peripheral Skin Description: non-attached, peeling, erythematous  Granulation: 70% Fibrotic Tissue: 10% Necrotic Tissue: 20%   Drainage Amount: minimal, serous, bloody  Signs of Infection: No      Small abrasion noted to medial 4th digit  No drainage noted  Does not probe deep  Neurological:  Gross sensation is diminished  Protective sensation is diminished  Patient Reports numbness and/or paresthesias  Additional data:     Lab Results: I have personally reviewed pertinent labs including:    Results from last 7 days   Lab Units 04/25/22  0941   WBC Thousand/uL 4 77   HEMOGLOBIN g/dL 12 7   HEMATOCRIT % 38 8   PLATELETS Thousands/uL 180   NEUTROS PCT % 62   LYMPHS PCT % 19   MONOS PCT % 14*   EOS PCT % 4     Results from last 7 days   Lab Units 04/25/22  0941   POTASSIUM mmol/L 4 5   CHLORIDE mmol/L 99*   CO2 mmol/L 26   BUN mg/dL 31*   CREATININE mg/dL 1 69*   CALCIUM mg/dL 8 7           Cultures: I have personally reviewed pertinent cultures including:              Imaging: I have personally reviewed pertinent reports in PACS  EKG, Pathology, and Other Studies: I have personally reviewed pertinent reports      Time Spent for Care: 30 minutes  More than 50% of total time spent on counseling and coordination of care as described above  ** Please Note: Portions of the record may have been created with voice recognition software  Occasional wrong word or "sound a like" substitutions may have occurred due to the inherent limitations of voice recognition software  Read the chart carefully and recognize, using context, where substitutions have occurred   ** Statement Selected

## 2022-04-25 NOTE — ASSESSMENT & PLAN NOTE
Lab Results   Component Value Date    HGBA1C 6 4 (H) 11/11/2021       No results for input(s): POCGLU in the last 72 hours  Blood Sugar Average: Last 72 hrs:  a1c at goal  Pt declines insulin  Will not start insulin sliding scale  He wants to remain on jardiance  He has brought this medication in from home  Had a long discussion with pt about the risk of continuing jardiance with possible requirement of surgery and if creatinine worsens  Pt wishes to continue jardiance at this time despite those risks  He is currently stable and no surgical procedure planned   Will continue it but will stop 24 hours prior to surgery or if creatinine worsens

## 2022-04-25 NOTE — ED PROVIDER NOTES
History  Chief Complaint   Patient presents with    Foot Pain     pt reports left foot pain, redness, drainage, foul odor  Patient is a 60 y/o male with PMH DM, HTN who presents with worsening left foot wound with increased redness, pain and some purulent drainage yesterday  He has been following with Dr Martha Dill, but notes worsening symptoms and new wound between 2nd and 3rd toes  He states the pain intermittently radiates up to his leg  Patient has been taking Tylenol, with minimal relief of his symptoms  He states the pain is worse with ambulation  Patient denies any new numbness, tingling of the foot and notes baseline neuropathy  Patient denies any lymphatic streaking, increased swelling, calf pain or swelling, fevers, chills, diaphoresis  He was last on doxycycline and Augmentin at the beginning of the month  Patient states he is otherwise in his usual state of health and denies any headaches, congestion, cough, shortness of breath, chest pain, abdominal pain, nausea, vomiting, diarrhea, urinary changes  Prior to Admission Medications   Prescriptions Last Dose Informant Patient Reported? Taking? Alpha-Lipoic Acid 600 MG CAPS 4/25/2022 at Unknown time  Yes Yes   Sig: Take 600 mg by mouth 2 (two) times a day     Apple Cider Vinegar 300 MG TABS 4/25/2022 at Unknown time  Yes Yes   Sig: Take 300 mg by mouth daily     Ascorbic Acid (vitamin C) 1000 MG tablet 4/25/2022 at Unknown time  Yes Yes   Sig: Take 1,000 mg by mouth 2 (two) times a day   BENFOTIAMINE PO 4/25/2022 at Unknown time  Yes Yes   Sig: Take 300 mg by mouth 2 (two) times a day     Blood Glucose Monitoring Suppl (OneTouch Verio Sync System) w/Device KIT  Self No No   Sig: Use daily Test sugar daily in the morning     Patient not taking: Reported on 3/8/2021   [employer] CINNAMON PO 4/25/2022 at Unknown time Self Yes Yes   Sig: Take by mouth 2 (two) times a day    Chromium Picolinate 500 MCG TABS 4/25/2022 at Unknown time Self Yes Yes Sig: Take by mouth daily    Empagliflozin (Jardiance) 25 MG TABS 2022 at Unknown time  No Yes   Sig: Take 1 tablet (25 mg total) by mouth daily   Garlic 9933 MG CAPS 3/93/6830 at Unknown time Self Yes Yes   Sig: Take by mouth 2 (two) times a day    Loteprednol Etabonate 1 % SUSP   Yes Yes   Sig: Apply to eye 2 (two) times a day I drop right eye   Magnesium ([employer]S Triple Magnesium Complex) 400 MG CAPS 2022 at Unknown time  Yes Yes   Sig: Take by mouth daily   Misc Natural Products (Blood Sugar Balance) TABS   Yes No   Sig: Take by mouth 2 (two) times a day   Multiple Vitamin (MULTI VITAMIN MENS PO) 2022 at Unknown time  Yes Yes   Sig: Take by mouth once   Potassium 99 MG TABS 2022 at Unknown time Self Yes Yes   Sig: Take by mouth daily    Semaglutide,0 25 or 0 5MG/DOS, (Ozempic, 0 25 or 0 5 MG/DOSE,) 2 MG/1 5ML SOPN Past Week at Unknown time  No Yes   Sig: Inject 0 25 mg under the skin once a week   Turmeric (QC TUMERIC COMPLEX PO) 2022 at Unknown time  Yes Yes   Sig: Take 1,000 mg by mouth once Tumeric /curcimin   VITAMIN D PO 2022 at Unknown time  Yes Yes   Sig: Take by mouth 2 (two) times a day   Zinc 50 MG CAPS 2022 at Unknown time Self Yes Yes   Sig: Take by mouth 2 (two) times a day    beta carotene 30 MG capsule 2022 at Unknown time Self Yes Yes   Sig: Take 30 mg by mouth 2 (two) times a day     carvedilol (COREG) 25 mg tablet 2022 at Unknown time  No Yes   Sig: Take 1 tablet (25 mg total) by mouth 2 (two) times a day with meals   gentamicin (GARAMYCIN) 0 3 % ophthalmic solution   Yes Yes   Si drop 2 (two) times a day Right eye   glucose blood (Contour Next Test) test strip   No No   Sig: Use to check blood sugar once a day   glucose blood (OneTouch Verio) test strip  Self No No   Sig: Test sugar daily in the morning     Patient not taking: Reported on 3/8/2021   lisinopril-hydrochlorothiazide (PRINZIDE,ZESTORETIC) 20-12 5 MG per tablet 2022 at Unknown time No Yes   Sig: Take 1 tablet by mouth 2 (two) times a day   pregabalin (LYRICA) 75 mg capsule 4/24/2022 at Unknown time  Yes Yes   Sig:     vitamin E, tocopherol, 400 units capsule 4/25/2022 at Unknown time Self Yes Yes   Sig: Take 400 Units by mouth daily      Facility-Administered Medications: None       Past Medical History:   Diagnosis Date    H/O eye surgery     High blood sugar     Hypertension        Past Surgical History:   Procedure Laterality Date    HERNIA REPAIR      KIDNEY SURGERY      MOUTH SURGERY         No family history on file  I have reviewed and agree with the history as documented  E-Cigarette/Vaping    E-Cigarette Use Never User      E-Cigarette/Vaping Substances    Nicotine No     THC No     CBD No     Flavoring No     Other No     Unknown No      Social History     Tobacco Use    Smoking status: Never Smoker    Smokeless tobacco: Never Used   Vaping Use    Vaping Use: Never used   Substance Use Topics    Alcohol use: Yes     Comment: ocassional    Drug use: Never       Review of Systems   Constitutional: Negative for chills, diaphoresis and fever  HENT: Negative for congestion  Respiratory: Negative for cough, shortness of breath and stridor  Cardiovascular: Negative for chest pain, palpitations and leg swelling  Gastrointestinal: Negative for abdominal pain, diarrhea, nausea and vomiting  Genitourinary: Negative for difficulty urinating and dysuria  Musculoskeletal: Negative for neck pain  Skin: Positive for color change and wound  Negative for pallor and rash  Left foot wound and pain   Neurological: Positive for numbness (baseline neuropathy)  Negative for dizziness, weakness, light-headedness and headaches  All other systems reviewed and are negative  Physical Exam  Physical Exam  Vitals and nursing note reviewed  Constitutional:       General: He is awake  He is not in acute distress  Appearance: He is well-developed   He is not ill-appearing, toxic-appearing or diaphoretic  HENT:      Head: Normocephalic and atraumatic  Right Ear: External ear normal       Left Ear: External ear normal       Nose: Nose normal    Eyes:      General: No scleral icterus  Conjunctiva/sclera: Conjunctivae normal       Pupils: Pupils are equal, round, and reactive to light  Neck:      Vascular: No JVD  Trachea: No tracheal deviation  Cardiovascular:      Rate and Rhythm: Normal rate and regular rhythm  Pulses: Normal pulses  Dorsalis pedis pulses are 2+ on the left side  Posterior tibial pulses are 2+ on the right side and 2+ on the left side  Heart sounds: Normal heart sounds  Pulmonary:      Effort: Pulmonary effort is normal  No respiratory distress  Breath sounds: Normal breath sounds  No stridor  No decreased breath sounds, wheezing, rhonchi or rales  Abdominal:      General: There is no distension  Musculoskeletal:         General: No deformity  Cervical back: Normal range of motion  Feet:    Feet:      Left foot:      Skin integrity: Ulcer, skin breakdown, erythema and dry skin present  Skin:     General: Skin is dry  Findings: No rash  Neurological:      Mental Status: He is alert and oriented to person, place, and time  GCS: GCS eye subscore is 4  GCS verbal subscore is 5  GCS motor subscore is 6  Psychiatric:         Behavior: Behavior normal  Behavior is cooperative                       Vital Signs  ED Triage Vitals   Temperature Pulse Respirations Blood Pressure SpO2   04/25/22 0915 04/25/22 0914 04/25/22 0914 04/25/22 0914 04/25/22 0914   98 9 °F (37 2 °C) 88 18 134/82 97 %      Temp Source Heart Rate Source Patient Position - Orthostatic VS BP Location FiO2 (%)   04/25/22 0915 04/25/22 0914 04/25/22 0914 04/25/22 0914 --   Oral Monitor Sitting Right arm       Pain Score       04/25/22 1129       6           Vitals:    04/25/22 0914 04/25/22 1140   BP: 134/82 137/84   Pulse: 88 79   Patient Position - Orthostatic VS: Sitting Lying         Visual Acuity      ED Medications  Medications   ascorbic acid (VITAMIN C) tablet 1,000 mg (has no administration in time range)   carvedilol (COREG) tablet 25 mg (has no administration in time range)   Empagliflozin TABS 25 mg (has no administration in time range)   gentamicin (GARAMYCIN) 0 3 % ophthalmic solution 1 drop (has no administration in time range)   lisinopril (ZESTRIL) tablet 20 mg (has no administration in time range)   magnesium oxide (MAG-OX) tablet 400 mg (has no administration in time range)   multivitamin stress formula tablet 1 tablet (has no administration in time range)   pregabalin (LYRICA) capsule 75 mg (has no administration in time range)   zinc sulfate (ZINCATE) capsule 220 mg (has no administration in time range)   sodium chloride 0 9 % infusion (has no administration in time range)   ondansetron (ZOFRAN) injection 4 mg (has no administration in time range)   acetaminophen (TYLENOL) tablet 650 mg (has no administration in time range)   heparin (porcine) subcutaneous injection 5,000 Units (has no administration in time range)   HYDROmorphone (DILAUDID) injection 0 5 mg (has no administration in time range)   ampicillin-sulbactam (UNASYN) 3 g in sodium chloride 0 9 % 100 mL IVPB (has no administration in time range)   doxycycline hyclate (VIBRAMYCIN) capsule 100 mg (has no administration in time range)   morphine injection 2 mg (2 mg Intravenous Given 4/25/22 0943)   morphine (PF) 4 mg/mL injection 4 mg (4 mg Intravenous Given 4/25/22 1129)   metroNIDAZOLE (FLAGYL) tablet 500 mg (500 mg Oral Given 4/25/22 1244)   doxycycline (VIBRAMYCIN) 100 mg in sodium chloride 0 9 % 100 mL IVPB (100 mg Intravenous New Bag 4/25/22 1245)       Diagnostic Studies  Results Reviewed     Procedure Component Value Units Date/Time    COVID/FLU/RSV [[id number]]     Lab Status: No result Specimen: Nares from Nose     Sedimentation rate, automated [[id number]]  (Abnormal) Collected: 04/25/22 0941    Lab Status: Final result Specimen: Blood from Arm, Left Updated: 04/25/22 1331     Sed Rate 64 mm/hour     Wound culture and Gram stain [[id number]]     Lab Status: No result Specimen: Wound     Platelet count [[id number]]     Lab Status: No result Specimen: Blood     Blood culture [[id number]] Collected: 04/25/22 1128    Lab Status: In process Specimen: Blood from Arm, Right Updated: 04/25/22 1138    Blood culture [[id number]] Collected: 04/25/22 0939    Lab Status:  In process Specimen: Blood from Arm, Left Updated: 04/25/22 1251    Basic metabolic panel [[id number]]  (Abnormal) Collected: 04/25/22 0941    Lab Status: Final result Specimen: Blood from Arm, Left Updated: 04/25/22 1012     Sodium 135 mmol/L      Potassium 4 5 mmol/L      Chloride 99 mmol/L      CO2 26 mmol/L      ANION GAP 10 mmol/L      BUN 31 mg/dL      Creatinine 1 69 mg/dL      Glucose 216 mg/dL      Calcium 8 7 mg/dL      eGFR 43 ml/min/1 73sq m     Narrative:      [place] guidelines for Chronic Kidney Disease (CKD):     Stage 1 with normal or high GFR (GFR > 90 mL/min/1 73 square meters)    Stage 2 Mild CKD (GFR = 60-89 mL/min/1 73 square meters)    Stage 3A Moderate CKD (GFR = 45-59 mL/min/1 73 square meters)    Stage 3B Moderate CKD (GFR = 30-44 mL/min/1 73 square meters)    Stage 4 Severe CKD (GFR = 15-29 mL/min/1 73 square meters)    Stage 5 End Stage CKD (GFR <15 mL/min/1 73 square meters)  Note: GFR calculation is accurate only with a steady state creatinine    CBC and differential [[id number]]  (Abnormal) Collected: 04/25/22 0941    Lab Status: Final result Specimen: Blood from Arm, Left Updated: 04/25/22 1001     WBC 4 77 Thousand/uL      RBC 4 03 Million/uL      Hemoglobin 12 7 g/dL      Hematocrit 38 8 %      MCV 96 fL      MCH 31 5 pg      MCHC 32 7 g/dL      RDW 12 7 %      MPV 10 2 fL      Platelets 810 Thousands/uL      nRBC 0 /100 WBCs Neutrophils Relative 62 %      Immat GRANS % 0 %      Lymphocytes Relative 19 %      Monocytes Relative 14 %      Eosinophils Relative 4 %      Basophils Relative 1 %      Neutrophils Absolute 3 01 Thousands/µL      Immature Grans Absolute 0 00 Thousand/uL      Lymphocytes Absolute 0 91 Thousands/µL      Monocytes Absolute 0 65 Thousand/µL      Eosinophils Absolute 0 17 Thousand/µL      Basophils Absolute 0 03 Thousands/µL                  XR foot 3+ views LEFT   Final Result by Janis Garner MD (04/25 1156)      No acute osseous abnormality  Degenerative changes  Heel spurs      No evidence of osteomyelitis         Workstation performed: [id number]         MRI inpatient order    (Results Pending)              Procedures  Procedures         ED Course  ED Course as of 04/25/22 1423   Mon Apr 25, 2022   2898 Podiatry will be down to see patient   1022 Creatinine(!): 1 69  Similar to baseline   1050 Podiatry would like patient admitted to medicine with plan for MRI  Will also start IV clindamycin and po Flagyl   1121 Spoke with Dr Keith Pacheco, reviewed case and ED course, she is agreeable to inpatient admission   [phone] with clinical pharmacist regarding abx choice  Given patient's history of E  Coli from foot wound culture, will change to doxycycline from clindamycin  SBIRT 20yo+      Most Recent Value   SBIRT (24 yo +)    In order to provide better care to our patients, we are screening all of our patients for alcohol and drug use  Would it be okay to ask you these screening questions?  No Filed at: 04/25/2022 9914                    MDM    Disposition  Final diagnoses:   Diabetic ulcer of left foot ([address] )   Infected wound   CKD (chronic kidney disease)     Time reflects when diagnosis was documented in both MDM as applicable and the Disposition within this note     Time User Action Codes Description Comment    4/25/2022 11:15 AM Calvin, [address] [place] [address] Drive [E11 621, L97 529] Diabetic ulcer of left foot ([facility] )     4/25/2022 11:15 AM CostlowDaphne Sharps Add [T14  8XXA,  L08 9] Infected wound     4/25/2022 11:16 AM CostlowDaphne Sharps Add [N18 9] CKD (chronic kidney disease)     4/25/2022 12:31 PM Estephania Adarsh Add [E11 621,  L97 509] Diabetic foot ulcer ([facility] )     4/25/2022 12:31 PM Queenie KING Add [L03 90] Cellulitis       ED Disposition     ED Disposition Condition Date/Time Comment    Admit Stable Mon Apr 25, 2022 11:19 AM Case was discussed with Dr Milo Laura and the patient's admission status was agreed to be Admission Status: inpatient status to the service of Dr Milo Laura  Follow-up Information    None         Patient's Medications   Discharge Prescriptions    No medications on file       No discharge procedures on file      PDMP Review       Value Time User    PDMP Reviewed  Yes 4/2/2022 12:08 AM Arnaldo Leong PA-C          ED Provider  Electronically Signed by           Lesli Gagnon PA-C  04/25/22 5074

## 2022-04-26 ENCOUNTER — APPOINTMENT (INPATIENT)
Dept: MRI IMAGING | Facility: HOSPITAL | Age: 57
DRG: 623 | End: 2022-04-26
Payer: MEDICARE

## 2022-04-26 LAB
ANION GAP SERPL CALCULATED.3IONS-SCNC: 7 MMOL/L (ref 4–13)
BASOPHILS # BLD AUTO: 0.05 THOUSANDS/ΜL (ref 0–0.1)
BASOPHILS NFR BLD AUTO: 1 % (ref 0–1)
BUN SERPL-MCNC: 33 MG/DL (ref 5–25)
CALCIUM SERPL-MCNC: 9.4 MG/DL (ref 8.3–10.1)
CHLORIDE SERPL-SCNC: 101 MMOL/L (ref 100–108)
CO2 SERPL-SCNC: 26 MMOL/L (ref 21–32)
CREAT SERPL-MCNC: 1.76 MG/DL (ref 0.6–1.3)
EOSINOPHIL # BLD AUTO: 0.15 THOUSAND/ΜL (ref 0–0.61)
EOSINOPHIL NFR BLD AUTO: 3 % (ref 0–6)
ERYTHROCYTE [DISTWIDTH] IN BLOOD BY AUTOMATED COUNT: 12.5 % (ref 11.6–15.1)
EST. AVERAGE GLUCOSE BLD GHB EST-MCNC: 143 MG/DL
GFR SERPL CREATININE-BSD FRML MDRD: 41 ML/MIN/1.73SQ M
GLUCOSE SERPL-MCNC: 107 MG/DL (ref 65–140)
GLUCOSE SERPL-MCNC: 147 MG/DL (ref 65–140)
GLUCOSE SERPL-MCNC: 148 MG/DL (ref 65–140)
GLUCOSE SERPL-MCNC: 164 MG/DL (ref 65–140)
GLUCOSE SERPL-MCNC: 186 MG/DL (ref 65–140)
HBA1C MFR BLD: 6.6 %
HCT VFR BLD AUTO: 38 % (ref 36.5–49.3)
HGB BLD-MCNC: 12.4 G/DL (ref 12–17)
IMM GRANULOCYTES # BLD AUTO: 0.02 THOUSAND/UL (ref 0–0.2)
IMM GRANULOCYTES NFR BLD AUTO: 0 % (ref 0–2)
LYMPHOCYTES # BLD AUTO: 0.7 THOUSANDS/ΜL (ref 0.6–4.47)
LYMPHOCYTES NFR BLD AUTO: 16 % (ref 14–44)
MCH RBC QN AUTO: 31.3 PG (ref 26.8–34.3)
MCHC RBC AUTO-ENTMCNC: 32.6 G/DL (ref 31.4–37.4)
MCV RBC AUTO: 96 FL (ref 82–98)
MONOCYTES # BLD AUTO: 0.54 THOUSAND/ΜL (ref 0.17–1.22)
MONOCYTES NFR BLD AUTO: 12 % (ref 4–12)
NEUTROPHILS # BLD AUTO: 3.05 THOUSANDS/ΜL (ref 1.85–7.62)
NEUTS SEG NFR BLD AUTO: 68 % (ref 43–75)
NRBC BLD AUTO-RTO: 0 /100 WBCS
PLATELET # BLD AUTO: 188 THOUSANDS/UL (ref 149–390)
PMV BLD AUTO: 10.2 FL (ref 8.9–12.7)
POTASSIUM SERPL-SCNC: 4.7 MMOL/L (ref 3.5–5.3)
RBC # BLD AUTO: 3.96 MILLION/UL (ref 3.88–5.62)
SODIUM SERPL-SCNC: 134 MMOL/L (ref 136–145)
WBC # BLD AUTO: 4.51 THOUSAND/UL (ref 4.31–10.16)

## 2022-04-26 PROCEDURE — 83036 HEMOGLOBIN GLYCOSYLATED A1C: CPT | Performed by: PHYSICIAN ASSISTANT

## 2022-04-26 PROCEDURE — 73718 MRI LOWER EXTREMITY W/O DYE: CPT

## 2022-04-26 PROCEDURE — 99232 SBSQ HOSP IP/OBS MODERATE 35: CPT | Performed by: INTERNAL MEDICINE

## 2022-04-26 PROCEDURE — 99232 SBSQ HOSP IP/OBS MODERATE 35: CPT | Performed by: PHYSICIAN ASSISTANT

## 2022-04-26 PROCEDURE — 85025 COMPLETE CBC W/AUTO DIFF WBC: CPT | Performed by: INTERNAL MEDICINE

## 2022-04-26 PROCEDURE — 80048 BASIC METABOLIC PNL TOTAL CA: CPT | Performed by: INTERNAL MEDICINE

## 2022-04-26 PROCEDURE — 82948 REAGENT STRIP/BLOOD GLUCOSE: CPT

## 2022-04-26 RX ORDER — LORAZEPAM 2 MG/ML
0.5 INJECTION INTRAMUSCULAR ONCE AS NEEDED
Status: COMPLETED | OUTPATIENT
Start: 2022-04-26 | End: 2022-04-26

## 2022-04-26 RX ORDER — LANOLIN ALCOHOL/MO/W.PET/CERES
6 CREAM (GRAM) TOPICAL
Status: DISCONTINUED | OUTPATIENT
Start: 2022-04-26 | End: 2022-05-02 | Stop reason: HOSPADM

## 2022-04-26 RX ORDER — TRAMADOL HYDROCHLORIDE 50 MG/1
50 TABLET ORAL EVERY 6 HOURS PRN
Status: DISCONTINUED | OUTPATIENT
Start: 2022-04-26 | End: 2022-05-02 | Stop reason: HOSPADM

## 2022-04-26 RX ADMIN — GENTAMICIN SULFATE 1 DROP: 3 SOLUTION/ DROPS OPHTHALMIC at 09:05

## 2022-04-26 RX ADMIN — HYDROMORPHONE HYDROCHLORIDE 0.5 MG: 1 INJECTION, SOLUTION INTRAMUSCULAR; INTRAVENOUS; SUBCUTANEOUS at 09:37

## 2022-04-26 RX ADMIN — ONDANSETRON 4 MG: 2 INJECTION INTRAMUSCULAR; INTRAVENOUS at 09:38

## 2022-04-26 RX ADMIN — DOXYCYCLINE 100 MG: 100 CAPSULE ORAL at 08:34

## 2022-04-26 RX ADMIN — GENTAMICIN SULFATE 1 DROP: 3 SOLUTION/ DROPS OPHTHALMIC at 21:48

## 2022-04-26 RX ADMIN — LORAZEPAM 0.5 MG: 2 INJECTION INTRAMUSCULAR; INTRAVENOUS at 17:04

## 2022-04-26 RX ADMIN — ONDANSETRON 4 MG: 2 INJECTION INTRAMUSCULAR; INTRAVENOUS at 03:29

## 2022-04-26 RX ADMIN — AMPICILLIN SODIUM AND SULBACTAM SODIUM 3 G: 10; 5 INJECTION, POWDER, FOR SOLUTION INTRAVENOUS at 14:08

## 2022-04-26 RX ADMIN — AMPICILLIN SODIUM AND SULBACTAM SODIUM 3 G: 10; 5 INJECTION, POWDER, FOR SOLUTION INTRAVENOUS at 03:28

## 2022-04-26 RX ADMIN — HYDROMORPHONE HYDROCHLORIDE 0.5 MG: 1 INJECTION, SOLUTION INTRAMUSCULAR; INTRAVENOUS; SUBCUTANEOUS at 03:29

## 2022-04-26 RX ADMIN — AMPICILLIN SODIUM AND SULBACTAM SODIUM 3 G: 10; 5 INJECTION, POWDER, FOR SOLUTION INTRAVENOUS at 08:34

## 2022-04-26 RX ADMIN — AMPICILLIN SODIUM AND SULBACTAM SODIUM 3 G: 10; 5 INJECTION, POWDER, FOR SOLUTION INTRAVENOUS at 21:48

## 2022-04-26 RX ADMIN — MELATONIN 6 MG: at 21:47

## 2022-04-26 RX ADMIN — DOXYCYCLINE 100 MG: 100 CAPSULE ORAL at 21:47

## 2022-04-26 RX ADMIN — PREGABALIN 75 MG: 75 CAPSULE ORAL at 21:47

## 2022-04-26 RX ADMIN — CARVEDILOL 25 MG: 25 TABLET, FILM COATED ORAL at 21:48

## 2022-04-26 NOTE — PROGRESS NOTES
Podiatry - Progress Note  Patient: Sohail Culp 59 y o  male   MRN: [id number]  PCP: Ally Monet MD  [facility] [address] [place] [address] 224-01 Encounter: [id number]  Date Of Visit: 22    ASSESSMENT:    Sohail Culp is a 59 y o  male with:    1  Left plantar foot ulceration  2  Left 3rd interdigital space abrasion  3  L foot cellulitis  4  T2DM  5  CKD 2  6  Lumbar radiculopathy    PLAN:    · F/u MRI, patient may require anxiolytic vs full sedation for imaging  · IV  Unasyn, PO doxycycline per ID  Appreciate recommendations   · Continue local wound care, appreciate nursing assistance with dressing changes  · F/u PT/OT recommendations  Patient is to be partial weight bearing to left heel with darco wedge shoe  · Elevation and offloading on green foam wedges or pillows when non-ambulatory  · Rest of care per primary team      Weightbearing status: PWB to LLE with darco wedge    SUBJECTIVE:     The patient was seen, evaluated, and assessed at bedside today  The patient was awake, alert, and in no acute distress  No acute events overnight  The patient reports mild to moderate pain to his left foot  Patient denies N/V/F/chills/SOB/CP  OBJECTIVE:     Vitals:   /68   Pulse 85   Temp 98 5 °F (36 9 °C) (Oral)   Resp 18   Ht 5' 11" (1 803 m)   Wt 123 kg (271 lb 9 7 oz)   SpO2 94%   BMI 37 88 kg/m²     Temp (24hrs), Av 6 °F (37 °C), Min:98 3 °F (36 8 °C), Max:99 °F (37 2 °C)      Physical Exam:     General: Alert, cooperative and no distress  Lungs: Non labored breathing  Abdomen: Soft, non-tender  Lower extremity exam:  Cardiovascular status at baseline  Neurological status at baseline  Musculoskeletal status at baseline  No calf tenderness noted       Lower extremity wound(s) as noted below:  Location: left lateral 5th MT  Length 4 0cm: Width 3 4cm: Depth 0 4:   Deepest Tissue Noted in Base: subcutaneous tissue  Probe to Bone: No  Peripheral Skin Description: non-attached, peeling, cellulitic  Granulation: 30% Fibrotic Tissue: 10% Necrotic Tissue: 60%   Drainage Amount: minimal, serous, bloody  Signs of Infection: No        Small abrasion noted to medial 4th digit  No drainage noted  Does not probe deep  Additional Data:     Labs:    Results from last 7 days   Lab Units 04/26/22  0415   WBC Thousand/uL 4 51   HEMOGLOBIN g/dL 12 4   HEMATOCRIT % 38 0   PLATELETS Thousands/uL 188   NEUTROS PCT % 68   LYMPHS PCT % 16   MONOS PCT % 12   EOS PCT % 3     Results from last 7 days   Lab Units 04/26/22  0415   POTASSIUM mmol/L 4 7   CHLORIDE mmol/L 101   CO2 mmol/L 26   BUN mg/dL 33*   CREATININE mg/dL 1 76*   CALCIUM mg/dL 9 4           * I Have Reviewed All Lab Data Listed Above  Recent Cultures (last 7 days):     Results from last 7 days   Lab Units 04/25/22  1128 04/25/22  0939   BLOOD CULTURE  Received in Microbiology Lab  Culture in Progress  Received in Microbiology Lab  Culture in Progress  Imaging: I have personally reviewed pertinent films in PACS  EKG, Pathology, and Other Studies: I have personally reviewed pertinent reports  ** Please Note: Portions of the record may have been created with voice recognition software  Occasional wrong word or "sound a like" substitutions may have occurred due to the inherent limitations of voice recognition software  Read the chart carefully and recognize, using context, where substitutions have occurred   **

## 2022-04-26 NOTE — PROGRESS NOTES
[address] [place] [address]  Progress Note Rosmery Rodriguez 1965, 59 y o  male MRN: [id number]  Unit/Bed#: [facility] 2 -01 Encounter: [id number]  Primary Care Provider: Tawana Ruiz MD   Date and time admitted to hospital: 4/25/2022  9:16 AM    * Diabetic foot ulcer ([address] )  Assessment & Plan  Diabetic left foot ulcer with purulent drainage and surrounding cellulitis  Appreciate podiatry recommendations  Appreciate Infectious Disease consult recommendations, continue Unasyn and oral doxycycline  Awaiting MRI left foot for further plan per Podiatry  No surgery planned as of yet  Monitor fever curve, currently hemodynamically stable and nontoxic    Patient is refusign heparin SQ     Chronic kidney disease, stage 3 [facility])  Assessment & Plan  Lab Results   Component Value Date    EGFR 41 04/26/2022    EGFR 43 04/25/2022    EGFR 58 04/11/2022    CREATININE 1 76 (H) 04/26/2022    CREATININE 1 69 (H) 04/25/2022    CREATININE 1 32 (H) 04/11/2022   baseline creatinine is 1 4-1 6  Monitor renal function daily  Will monitor closely and check bmp in am    Holding HCTZ lisinopril    Class 2 severe obesity with serious comorbidity and body mass index (BMI) of 37 0 to 37 9 in adult [facility])  Assessment & Plan  Encourage diet and weight loss  BMI 37 8 noted    Diabetes mellitus [facility])  Assessment & Plan  Lab Results   Component Value Date    HGBA1C 6 4 (H) 11/11/2021       Recent Labs     04/25/22  1623 04/25/22  2103 04/26/22  0718   POCGLU 162* 126 107       Blood Sugar Average: Last 72 hrs:  (P) 005 [id number]   Check updated A1c  Pt declines insulin  Will not start insulin sliding scale  He wants to remain on jardiance  Previous provider discussed with pt about the risk of continuing jardiance with possible requirement of surgery and if creatinine worsens  Pt wishes to continue jardiance at this time despite those risks       He is currently stable and no surgical procedure planned at this time   Will continue it but will stop 24 hours prior to surgery or if creatinine continues to climb      Essential hypertension  Assessment & Plan  Continue Coreg  Holding HCTZ and lisinopril with low-normal blood pressure  Monitor vital signs and avoid hypotension        VTE Pharmacologic Prophylaxis: VTE Score: 3 Moderate Risk (Score 3-4) - Pharmacological DVT Prophylaxis Ordered: heparin, patient is refusing heparin     Patient Centered Rounds: I performed bedside rounds with nursing staff today  Discussions with Specialists or Other Care Team Provider: spoke with podiatry at the bedside     Time Spent for Care: 20 minutes  More than 50% of total time spent on counseling and coordination of care as described above  Current Length of Stay: 1 day(s)  Current Patient Status: Inpatient   Certification Statement: The patient will continue to require additional inpatient hospital stay due to left foot diabetic ulcer  Discharge Plan: Anticipate discharge in >72 hrs to discharge location to be determined pending rehab evaluations  Code Status: Level 1 - Full Code    Subjective:   Patient is resting in bed  No complaints  Pain in foot and some neck/back stiffness  Also with poor sleep  No other complaints  Objective:     Vitals:   Temp (24hrs), Av 7 °F (37 1 °C), Min:98 3 °F (36 8 °C), Max:99 °F (37 2 °C)    Temp:  [98 3 °F (36 8 °C)-99 °F (37 2 °C)] 98 5 °F (36 9 °C)  HR:  [78-88] 85  Resp:  [18] 18  BP: (108-137)/(68-90) 108/68  SpO2:  [94 %-98 %] 94 %  Body mass index is 37 88 kg/m²  Input and Output Summary (last 24 hours): Intake/Output Summary (Last 24 hours) at 2022 0846  Last data filed at 2022 1345  Gross per 24 hour   Intake 100 ml   Output --   Net 100 ml       Physical Exam:   Physical Exam  Vitals reviewed  Constitutional:       General: He is not in acute distress  Appearance: He is obese  He is not toxic-appearing or diaphoretic     Eyes:      Conjunctiva/sclera: Conjunctivae normal    Cardiovascular:      Rate and Rhythm: Normal rate and regular rhythm  Pulmonary:      Effort: Pulmonary effort is normal       Breath sounds: Normal breath sounds  Abdominal:      General: Bowel sounds are normal       Palpations: Abdomen is soft  Musculoskeletal:         General: Deformity (left foot, redressed by podiatry ) present  Skin:     Findings: Erythema (left foot) present  Neurological:      Mental Status: He is alert  Mental status is at baseline  Psychiatric:         Mood and Affect: Mood normal           Additional Data:     Labs:  Results from last 7 days   Lab Units 04/26/22  0415   WBC Thousand/uL 4 51   HEMOGLOBIN g/dL 12 4   HEMATOCRIT % 38 0   PLATELETS Thousands/uL 188   NEUTROS PCT % 68   LYMPHS PCT % 16   MONOS PCT % 12   EOS PCT % 3     Results from last 7 days   Lab Units 04/26/22  0415   SODIUM mmol/L 134*   POTASSIUM mmol/L 4 7   CHLORIDE mmol/L 101   CO2 mmol/L 26   BUN mg/dL 33*   CREATININE mg/dL 1 76*   ANION GAP mmol/L 7   CALCIUM mg/dL 9 4   GLUCOSE RANDOM mg/dL 186*         Results from last 7 days   Lab Units 04/26/22  0718 04/25/22  2103 04/25/22  1623   POC GLUCOSE mg/dl 107 126 162*               Lines/Drains:  Invasive Devices  Report    Peripheral Intravenous Line            Peripheral IV 04/25/22 Left Antecubital <1 day                      Imaging: Reviewed radiology reports from this admission including: xray(s)    Recent Cultures (last 7 days):   Results from last 7 days   Lab Units 04/25/22  1128 04/25/22  0939   BLOOD CULTURE  Received in Microbiology Lab  Culture in Progress  Received in Microbiology Lab  Culture in Progress         Last 24 Hours Medication List:   Current Facility-Administered Medications   Medication Dose Route Frequency Provider Last Rate    acetaminophen  650 mg Oral Q6H PRN Sanjuanita Ledesma DO      ampicillin-sulbactam  3 g Intravenous Q6H Vin Fernández MD 3 g (04/26/22 0898)    vitamin C  1,000 mg Oral BID Alma Rosa Sandoval DO  carvedilol  25 mg Oral BID With Meals Vannessa Rios DO      doxycycline hyclate  100 mg Oral Q12H [address] Eladio Earl MD      Empagliflozin  25 mg Oral HS Sanjuanita Ambron, DO      gentamicin  1 drop Right Eye BID Sanjuanita Ambron, DO      heparin (porcine)  5,000 Units Subcutaneous [facility] Sanjuanita Ambron, DO      HYDROmorphone  0 5 mg Intravenous Q4H PRN Sanjuanita Ambron, DO      LORazepam  0 5 mg Intravenous Once PRN Teena Sarmiento PA-C      magnesium oxide  400 mg Oral Daily Sanjuanita Ambron, DO      multivitamin stress formula  1 tablet Oral Daily Sanjuanita Ambron, DO      ondansetron  4 mg Intravenous Q6H PRN Sanjuanita Ambron, DO      pregabalin  75 mg Oral Daily Sanjuanita Ambron, DO      sodium chloride  50 mL/hr Intravenous Continuous Sanjuanita Ambron, DO 50 mL/hr (04/25/22 2113)    zinc sulfate  220 mg Oral Daily Vannessa Rios DO          Today, Patient Was Seen By: Glory Quezada PA-C    **Please Note: This note may have been constructed using a voice recognition system  **

## 2022-04-26 NOTE — PLAN OF CARE
Problem: SAFETY ADULT  Goal: Patient will remain free of falls  Description: INTERVENTIONS:  - Educate patient/family on patient safety including physical limitations  - Instruct patient to call for assistance with activity   - Consult OT/PT to assist with strengthening/mobility   - Keep Call bell within reach  - Keep bed low and locked with side rails adjusted as appropriate  - Keep care items and personal belongings within reach  - Initiate and maintain comfort rounds  - Make Fall Risk Sign visible to staff  - Offer Toileting every 2 Hours, in advance of need  - Initiate/Maintain   alarm  - Obtain necessary fall risk management equipment:   - Apply yellow socks and bracelet for high fall risk patients  - Consider moving patient to room near nurses station  Outcome: Progressing  Goal: Maintain or return to baseline ADL function  Description: INTERVENTIONS:  -  Assess patient's ability to carry out ADLs; assess patient's baseline for ADL function and identify physical deficits which impact ability to perform ADLs (bathing, care of mouth/teeth, toileting, grooming, dressing, etc )  - Assess/evaluate cause of self-care deficits   - Assess range of motion  - Assess patient's mobility; develop plan if impaired  - Assess patient's need for assistive devices and provide as appropriate  - Encourage maximum independence but intervene and supervise when necessary  - Involve family in performance of ADLs  - Assess for home care needs following discharge   - Consider OT consult to assist with ADL evaluation and planning for discharge  - Provide patient education as appropriate  Outcome: Progressing  Goal: Maintains/Returns to pre admission functional level  Description: INTERVENTIONS:  - Perform BMAT or MOVE assessment daily    - Set and communicate daily mobility goal to care team and patient/family/caregiver     - Collaborate with rehabilitation services on mobility goals if consulted  - Perform Range of Motion 3 times a day   - Reposition patient every 2 hours    - Dangle patient 3 times a day  - Stand patient 3 times a day  - Ambulate patient 3 times a day  - Out of bed to chair 3 times a day   - Out of bed for meals 3 times a day  - Out of bed for toileting  - Record patient progress and toleration of activity level   Outcome: Progressing     Problem: INFECTION - ADULT  Goal: Absence or prevention of progression during hospitalization  Description: INTERVENTIONS:  - Assess and monitor for signs and symptoms of infection  - Monitor lab/diagnostic results  - Monitor all insertion sites, i e  indwelling lines, tubes, and drains  - Monitor endotracheal if appropriate and nasal secretions for changes in amount and color  - [place] appropriate cooling/warming therapies per order  - Administer medications as ordered  - Instruct and encourage patient and family to use good hand hygiene technique  - Identify and instruct in appropriate isolation precautions for identified infection/condition  Outcome: Progressing  Goal: Absence of fever/infection during neutropenic period  Description: INTERVENTIONS:  - Monitor WBC    Outcome: Progressing

## 2022-04-26 NOTE — ASSESSMENT & PLAN NOTE
Diabetic left foot ulcer with purulent drainage and surrounding cellulitis  MRI left foot: small plantar abscess base of 5th toe, findings of osteitis, no specific OM, no fracture   Appreciate podiatry recommendations   Will follow up with further recs today   weight bearing status: Partial weight bearing to LLE with darco wedge   Appreciate Infectious Disease consult recommendations, continue Unasyn and oral doxycycline  Blood cultures negative x 24 hours   Monitor fever curve, currently hemodynamically stable and nontoxic

## 2022-04-26 NOTE — ASSESSMENT & PLAN NOTE
Lab Results   Component Value Date    EGFR 47 04/27/2022    EGFR 41 04/26/2022    EGFR 43 04/25/2022    CREATININE 1 57 (H) 04/27/2022    CREATININE 1 76 (H) 04/26/2022    CREATININE 1 69 (H) 04/25/2022   baseline creatinine is 1 4-1 6     Monitor renal function daily  Holding HCTZ lisinopril

## 2022-04-26 NOTE — ASSESSMENT & PLAN NOTE
Diabetic left foot ulcer with purulent drainage and surrounding cellulitis  Appreciate podiatry recommendations     Appreciate Infectious Disease consult recommendations, continue Unasyn and oral doxycycline  Awaiting MRI left foot for further plan per Podiatry  No surgery planned as of yet  Monitor fever curve, currently hemodynamically stable and nontoxic

## 2022-04-26 NOTE — PROGRESS NOTES
Progress Note - Infectious Disease   Julieann Frankel 59 y o  male MRN: [id number]  Unit/Bed#: [place] [address] [address] 224-01 Encounter: [id number]      Impression/Plan:  1  Infected diabetic foot ulcer-persistent despite outpatient oral antibiotics  Outpatient wound culture grew an E coli and mixed skin mendez  Fortunately is not systemically ill, hemodynamically stable, nontoxic  His antibiotics are limited because he has had a severe rash with cephalosporins, however he tolerates penicillins without difficulty  -continue Unasyn doxycycline for now  -check MRI of the foot  -recheck CBC with diff and BMP  -local wound care  -close podiatry follow-up  -anticipate need for surgical intervention     2  Diabetes mellitus-type 2  Without long-term insulin use     3  Chronic kidney disease-stage III  Creatinine has bumped a bit over baseline  -recheck BMP  -dose adjusted antibiotics as needed  -volume management     4  Obesity-with a body mass index of greater than 37  Certainly a risk factor for complicating infection and relapse   -stress weight loss through diet and exercise    Discussed the above management plan with the primary service    Antibiotics:  Unasyn 2  Doxycycline to    Subjective:  Patient has no fever, chills, sweats; no nausea, vomiting, diarrhea; no cough, shortness of breath; no increased pain  No new symptoms  Objective:  Vitals:  Temp:  [98 3 °F (36 8 °C)-99 °F (37 2 °C)] 98 5 °F (36 9 °C)  HR:  [78-85] 85  Resp:  [18] 18  BP: (108-137)/(68-90) 108/68  SpO2:  [94 %-98 %] 94 %  Temp (24hrs), Av 6 °F (37 °C), Min:98 3 °F (36 8 °C), Max:99 °F (37 2 °C)  Current: Temperature: 98 5 °F (36 9 °C)    Physical Exam:   General Appearance:  Alert, interactive, nontoxic, no acute distress  Throat: Oropharynx moist without lesions      Lungs:   Clear to auscultation bilaterally; no wheezes, rhonchi or rales; respirations unlabored   Heart:  RRR; no murmur, rub or gallop   Abdomen:   Soft, non-tender, non-distended, positive bowel sounds  Extremities: No clubbing, cyanosis  Left foot dressed with a dry dressing in place  Reviewed new images in epic with notable erythema on the dorsum of the foot and necrotic wound  Skin: No new rashes or lesions  No draining wounds noted  Labs, Imaging, & Other studies:   All pertinent labs and imaging studies were personally reviewed  Results from last 7 days   Lab Units 04/26/22  0415 04/25/22  1433 04/25/22  0941   WBC Thousand/uL 4 51  --  4 77   HEMOGLOBIN g/dL 12 4  --  12 7   PLATELETS Thousands/uL 188 189 180     Results from last 7 days   Lab Units 04/26/22  0415 04/25/22  0941 04/25/22  0941   SODIUM mmol/L 134*  --  135*   POTASSIUM mmol/L 4 7   < > 4 5   CHLORIDE mmol/L 101   < > 99*   CO2 mmol/L 26   < > 26   BUN mg/dL 33*   < > 31*   CREATININE mg/dL 1 76*   < > 1 69*   EGFR ml/min/1 73sq m 41   < > 43   CALCIUM mg/dL 9 4   < > 8 7    < > = values in this interval not displayed  Results from last 7 days   Lab Units 04/25/22  1128 04/25/22  0939   BLOOD CULTURE  Received in Microbiology Lab  Culture in Progress  Received in Microbiology Lab  Culture in Progress

## 2022-04-26 NOTE — ASSESSMENT & PLAN NOTE
Lab Results   Component Value Date    HGBA1C 6 6 (H) 04/26/2022       Recent Labs     04/26/22  1123 04/26/22  1637 04/26/22  2108 04/27/22  0726   POCGLU 164* 148* 147* 168*       Blood Sugar Average: Last 72 hrs:  (P) 146   Updated A1c 6 6%  Pt declines insulin  Will not start insulin sliding scale  He wants to remain on jardiance  Previous provider discussed with pt about the risk of continuing jardiance with possible requirement of surgery and if creatinine worsens  Pt wishes to continue jardiance at this time despite those risks      He is currently stable and no surgical procedure planned at this time  Will continue it but will stop 24 hours prior to surgery or if creatinine continues to climb  Will hold jardiance this evening in case podiatry plans for intervention tomorrow Appropriate/Calm admission

## 2022-04-26 NOTE — ASSESSMENT & PLAN NOTE
Lab Results   Component Value Date    EGFR 41 04/26/2022    EGFR 43 04/25/2022    EGFR 58 04/11/2022    CREATININE 1 76 (H) 04/26/2022    CREATININE 1 69 (H) 04/25/2022    CREATININE 1 32 (H) 04/11/2022   baseline creatinine is 1 4-1 6     Monitor renal function daily  Will monitor closely and check bmp in am    Holding HCTZ lisinopril

## 2022-04-26 NOTE — PLAN OF CARE
Problem: Potential for Falls  Goal: Patient will remain free of falls  Description: INTERVENTIONS:  - Educate patient/family on patient safety including physical limitations  - Instruct patient to call for assistance with activity   - Consult OT/PT to assist with strengthening/mobility   - Keep Call bell within reach  - Keep bed low and locked with side rails adjusted as appropriate  - Keep care items and personal belongings within reach  - Initiate and maintain comfort rounds  - Make Fall Risk Sign visible to staff  - Offer Toileting every  Hours, in advance of need  - Initiate/Maintain alarm  - Obtain necessary fall risk management equipment:   - Apply yellow socks and bracelet for high fall risk patients  - Consider moving patient to room near nurses station  Outcome: Progressing     Problem: PAIN - ADULT  Goal: Verbalizes/displays adequate comfort level or baseline comfort level  Description: Interventions:  - Encourage patient to monitor pain and request assistance  - Assess pain using appropriate pain scale  - Administer analgesics based on type and severity of pain and evaluate response  - Implement non-pharmacological measures as appropriate and evaluate response  - Consider cultural and social influences on pain and pain management  - Notify physician/advanced practitioner if interventions unsuccessful or patient reports new pain  Outcome: Progressing     Problem: INFECTION - ADULT  Goal: Absence or prevention of progression during hospitalization  Description: INTERVENTIONS:  - Assess and monitor for signs and symptoms of infection  - Monitor lab/diagnostic results  - Monitor all insertion sites, i e  indwelling lines, tubes, and drains  - Monitor endotracheal if appropriate and nasal secretions for changes in amount and color  - [place] appropriate cooling/warming therapies per order  - Administer medications as ordered  - Instruct and encourage patient and family to use good hand hygiene technique  - Identify and instruct in appropriate isolation precautions for identified infection/condition  Outcome: Progressing  Goal: Absence of fever/infection during neutropenic period  Description: INTERVENTIONS:  - Monitor WBC    Outcome: Progressing     Problem: SAFETY ADULT  Goal: Patient will remain free of falls  Description: INTERVENTIONS:  - Educate patient/family on patient safety including physical limitations  - Instruct patient to call for assistance with activity   - Consult OT/PT to assist with strengthening/mobility   - Keep Call bell within reach  - Keep bed low and locked with side rails adjusted as appropriate  - Keep care items and personal belongings within reach  - Initiate and maintain comfort rounds  - Make Fall Risk Sign visible to staff  - Offer Toileting every  Hours, in advance of need  - Initiate/Maintain alarm  - Obtain necessary fall risk management equipment:   - Apply yellow socks and bracelet for high fall risk patients  - Consider moving patient to room near nurses station  Outcome: Progressing  Goal: Maintain or return to baseline ADL function  Description: INTERVENTIONS:  -  Assess patient's ability to carry out ADLs; assess patient's baseline for ADL function and identify physical deficits which impact ability to perform ADLs (bathing, care of mouth/teeth, toileting, grooming, dressing, etc )  - Assess/evaluate cause of self-care deficits   - Assess range of motion  - Assess patient's mobility; develop plan if impaired  - Assess patient's need for assistive devices and provide as appropriate  - Encourage maximum independence but intervene and supervise when necessary  - Involve family in performance of ADLs  - Assess for home care needs following discharge   - Consider OT consult to assist with ADL evaluation and planning for discharge  - Provide patient education as appropriate  Outcome: Progressing  Goal: Maintains/Returns to pre admission functional level  Description: INTERVENTIONS:  - Perform BMAT or MOVE assessment daily    - Set and communicate daily mobility goal to care team and patient/family/caregiver  - Collaborate with rehabilitation services on mobility goals if consulted  - Perform Range of Motion  times a day  - Reposition patient every  hours    - Dangle patient  times a day  - Stand patient times a day  - Ambulate patie times a day  - Out of bed to chair  times a day   - Out of bed for meals  times a day  - Out of bed for toileting  - Record patient progress and toleration of activity level   Outcome: Progressing

## 2022-04-26 NOTE — ASSESSMENT & PLAN NOTE
Lab Results   Component Value Date    HGBA1C 6 4 (H) 11/11/2021       Recent Labs     04/25/22  1623 04/25/22  2103 04/26/22  0718   POCGLU 162* 126 107       Blood Sugar Average: Last 72 hrs:  (P) 180 [id number]   Check updated A1c  Pt declines insulin  Will not start insulin sliding scale  He wants to remain on jardiance  Previous provider discussed with pt about the risk of continuing jardiance with possible requirement of surgery and if creatinine worsens  Pt wishes to continue jardiance at this time despite those risks       He is currently stable and no surgical procedure planned at this time   Will continue it but will stop 24 hours prior to surgery or if creatinine continues to climb

## 2022-04-26 NOTE — ASSESSMENT & PLAN NOTE
Continue Coreg  Holding HCTZ and lisinopril with low-normal blood pressure  Monitor vital signs and avoid hypotension

## 2022-04-27 ENCOUNTER — ANESTHESIA EVENT (INPATIENT)
Dept: PERIOP | Facility: HOSPITAL | Age: 57
DRG: 623 | End: 2022-04-27
Payer: MEDICARE

## 2022-04-27 PROBLEM — Z98.890 H/O EYE SURGERY: Status: ACTIVE | Noted: 2022-04-27

## 2022-04-27 LAB
ANION GAP SERPL CALCULATED.3IONS-SCNC: 8 MMOL/L (ref 4–13)
BASOPHILS # BLD AUTO: 0.02 THOUSANDS/ΜL (ref 0–0.1)
BASOPHILS NFR BLD AUTO: 1 % (ref 0–1)
BUN SERPL-MCNC: 35 MG/DL (ref 5–25)
CALCIUM SERPL-MCNC: 8.7 MG/DL (ref 8.3–10.1)
CHLORIDE SERPL-SCNC: 103 MMOL/L (ref 100–108)
CO2 SERPL-SCNC: 26 MMOL/L (ref 21–32)
CREAT SERPL-MCNC: 1.57 MG/DL (ref 0.6–1.3)
EOSINOPHIL # BLD AUTO: 0.22 THOUSAND/ΜL (ref 0–0.61)
EOSINOPHIL NFR BLD AUTO: 6 % (ref 0–6)
ERYTHROCYTE [DISTWIDTH] IN BLOOD BY AUTOMATED COUNT: 12.4 % (ref 11.6–15.1)
GFR SERPL CREATININE-BSD FRML MDRD: 47 ML/MIN/1.73SQ M
GLUCOSE SERPL-MCNC: 110 MG/DL (ref 65–140)
GLUCOSE SERPL-MCNC: 126 MG/DL (ref 65–140)
GLUCOSE SERPL-MCNC: 130 MG/DL (ref 65–140)
GLUCOSE SERPL-MCNC: 167 MG/DL (ref 65–140)
GLUCOSE SERPL-MCNC: 168 MG/DL (ref 65–140)
HCT VFR BLD AUTO: 36 % (ref 36.5–49.3)
HGB BLD-MCNC: 11.5 G/DL (ref 12–17)
IMM GRANULOCYTES # BLD AUTO: 0.01 THOUSAND/UL (ref 0–0.2)
IMM GRANULOCYTES NFR BLD AUTO: 0 % (ref 0–2)
LYMPHOCYTES # BLD AUTO: 1.11 THOUSANDS/ΜL (ref 0.6–4.47)
LYMPHOCYTES NFR BLD AUTO: 29 % (ref 14–44)
MCH RBC QN AUTO: 30.1 PG (ref 26.8–34.3)
MCHC RBC AUTO-ENTMCNC: 31.9 G/DL (ref 31.4–37.4)
MCV RBC AUTO: 94 FL (ref 82–98)
MONOCYTES # BLD AUTO: 0.57 THOUSAND/ΜL (ref 0.17–1.22)
MONOCYTES NFR BLD AUTO: 15 % (ref 4–12)
NEUTROPHILS # BLD AUTO: 1.94 THOUSANDS/ΜL (ref 1.85–7.62)
NEUTS SEG NFR BLD AUTO: 49 % (ref 43–75)
NRBC BLD AUTO-RTO: 0 /100 WBCS
PLATELET # BLD AUTO: 187 THOUSANDS/UL (ref 149–390)
PMV BLD AUTO: 9.9 FL (ref 8.9–12.7)
POTASSIUM SERPL-SCNC: 4.5 MMOL/L (ref 3.5–5.3)
RBC # BLD AUTO: 3.82 MILLION/UL (ref 3.88–5.62)
SODIUM SERPL-SCNC: 137 MMOL/L (ref 136–145)
WBC # BLD AUTO: 3.87 THOUSAND/UL (ref 4.31–10.16)

## 2022-04-27 PROCEDURE — 80048 BASIC METABOLIC PNL TOTAL CA: CPT | Performed by: PHYSICIAN ASSISTANT

## 2022-04-27 PROCEDURE — 99231 SBSQ HOSP IP/OBS SF/LOW 25: CPT | Performed by: STUDENT IN AN ORGANIZED HEALTH CARE EDUCATION/TRAINING PROGRAM

## 2022-04-27 PROCEDURE — 82948 REAGENT STRIP/BLOOD GLUCOSE: CPT

## 2022-04-27 PROCEDURE — 85025 COMPLETE CBC W/AUTO DIFF WBC: CPT | Performed by: PHYSICIAN ASSISTANT

## 2022-04-27 PROCEDURE — 99232 SBSQ HOSP IP/OBS MODERATE 35: CPT | Performed by: PHYSICIAN ASSISTANT

## 2022-04-27 PROCEDURE — 97163 PT EVAL HIGH COMPLEX 45 MIN: CPT

## 2022-04-27 RX ORDER — CHLORHEXIDINE GLUCONATE 0.12 MG/ML
15 RINSE ORAL ONCE
Status: DISCONTINUED | OUTPATIENT
Start: 2022-04-28 | End: 2022-04-28 | Stop reason: HOSPADM

## 2022-04-27 RX ORDER — SODIUM CHLORIDE 9 MG/ML
75 INJECTION, SOLUTION INTRAVENOUS CONTINUOUS
Status: DISCONTINUED | OUTPATIENT
Start: 2022-04-28 | End: 2022-04-28

## 2022-04-27 RX ORDER — HYDROMORPHONE HCL/PF 1 MG/ML
0.5 SYRINGE (ML) INJECTION
Status: DISCONTINUED | OUTPATIENT
Start: 2022-04-27 | End: 2022-04-28 | Stop reason: HOSPADM

## 2022-04-27 RX ORDER — FENTANYL CITRATE/PF 50 MCG/ML
50 SYRINGE (ML) INJECTION
Status: DISCONTINUED | OUTPATIENT
Start: 2022-04-27 | End: 2022-04-28 | Stop reason: HOSPADM

## 2022-04-27 RX ORDER — ONDANSETRON 2 MG/ML
4 INJECTION INTRAMUSCULAR; INTRAVENOUS ONCE AS NEEDED
Status: DISCONTINUED | OUTPATIENT
Start: 2022-04-27 | End: 2022-04-28

## 2022-04-27 RX ORDER — SODIUM CHLORIDE 9 MG/ML
125 INJECTION, SOLUTION INTRAVENOUS CONTINUOUS
Status: DISCONTINUED | OUTPATIENT
Start: 2022-04-27 | End: 2022-04-28

## 2022-04-27 RX ADMIN — AMPICILLIN SODIUM AND SULBACTAM SODIUM 3 G: 10; 5 INJECTION, POWDER, FOR SOLUTION INTRAVENOUS at 08:56

## 2022-04-27 RX ADMIN — AMPICILLIN SODIUM AND SULBACTAM SODIUM 3 G: 10; 5 INJECTION, POWDER, FOR SOLUTION INTRAVENOUS at 21:03

## 2022-04-27 RX ADMIN — GLYCERIN, HYPROMELLOSE, POLYETHYLENE GLYCOL 1 DROP: .2; .2; 1 LIQUID OPHTHALMIC at 21:03

## 2022-04-27 RX ADMIN — ACETAMINOPHEN 325MG 650 MG: 325 TABLET ORAL at 19:23

## 2022-04-27 RX ADMIN — MELATONIN 6 MG: at 21:02

## 2022-04-27 RX ADMIN — AMPICILLIN SODIUM AND SULBACTAM SODIUM 3 G: 10; 5 INJECTION, POWDER, FOR SOLUTION INTRAVENOUS at 03:52

## 2022-04-27 RX ADMIN — GENTAMICIN SULFATE 1 DROP: 3 SOLUTION/ DROPS OPHTHALMIC at 08:55

## 2022-04-27 RX ADMIN — SODIUM CHLORIDE 125 ML/HR: 0.9 INJECTION, SOLUTION INTRAVENOUS at 19:24

## 2022-04-27 RX ADMIN — HYDROMORPHONE HYDROCHLORIDE 0.5 MG: 1 INJECTION, SOLUTION INTRAMUSCULAR; INTRAVENOUS; SUBCUTANEOUS at 05:56

## 2022-04-27 RX ADMIN — TRAMADOL HYDROCHLORIDE 50 MG: 50 TABLET, COATED ORAL at 19:23

## 2022-04-27 RX ADMIN — DOXYCYCLINE 100 MG: 100 CAPSULE ORAL at 08:55

## 2022-04-27 RX ADMIN — LOTEPREDNOL ETABONATE: 10 SUSPENSION TOPICAL at 21:09

## 2022-04-27 RX ADMIN — CARVEDILOL 25 MG: 25 TABLET, FILM COATED ORAL at 08:55

## 2022-04-27 RX ADMIN — ACETAMINOPHEN 325MG 650 MG: 325 TABLET ORAL at 08:56

## 2022-04-27 RX ADMIN — AMPICILLIN SODIUM AND SULBACTAM SODIUM 3 G: 10; 5 INJECTION, POWDER, FOR SOLUTION INTRAVENOUS at 14:30

## 2022-04-27 RX ADMIN — SODIUM CHLORIDE 75 ML/HR: 0.9 INJECTION, SOLUTION INTRAVENOUS at 23:05

## 2022-04-27 RX ADMIN — CARVEDILOL 25 MG: 25 TABLET, FILM COATED ORAL at 21:02

## 2022-04-27 RX ADMIN — DOXYCYCLINE 100 MG: 100 CAPSULE ORAL at 21:02

## 2022-04-27 RX ADMIN — HYDROMORPHONE HYDROCHLORIDE 0.5 MG: 1 INJECTION, SOLUTION INTRAMUSCULAR; INTRAVENOUS; SUBCUTANEOUS at 10:03

## 2022-04-27 RX ADMIN — ONDANSETRON 4 MG: 2 INJECTION INTRAMUSCULAR; INTRAVENOUS at 05:56

## 2022-04-27 RX ADMIN — GENTAMICIN SULFATE 1 DROP: 3 SOLUTION/ DROPS OPHTHALMIC at 21:03

## 2022-04-27 RX ADMIN — HYDROMORPHONE HYDROCHLORIDE 0.5 MG: 1 INJECTION, SOLUTION INTRAMUSCULAR; INTRAVENOUS; SUBCUTANEOUS at 15:30

## 2022-04-27 RX ADMIN — PREGABALIN 75 MG: 75 CAPSULE ORAL at 21:02

## 2022-04-27 NOTE — PLAN OF CARE
Problem: PHYSICAL THERAPY ADULT  Goal: Performs mobility at highest level of function for planned discharge setting  See evaluation for individualized goals  Description: Treatment/Interventions: Functional transfer training,LE strengthening/ROM,Elevations,Therapeutic exercise,Patient/family training,Equipment eval/education,Bed mobility,Gait training,Compensatory technique education,Continued evaluation,Spoke to nursing,OT          See flowsheet documentation for full assessment, interventions and recommendations  4/27/2022 1348 by Chalmers Hamman, PT  Note: Prognosis: Fair  Problem List: Impaired balance,Impaired judgement,Decreased safety awareness,Impaired sensation,Decreased skin integrity,Pain,Orthopedic restrictions  Assessment: Julieann Frankel is a 59 y o  male admitted to [facility] on 4/25/2022 for Diabetic foot ulcer ([address] )  Pending washout and possible vac placement 4/28/22 w podiatry  Current pwb to L heel in darco shoe  PT was consulted and pt was seen on 4/27/2022 for mobility assessment and d/c planning  Pt presents w wb restrictions per podiatry, colt monitoring, subacute pain L foot and chronic pain of back  At baseline is indep for ADLs and ambulation without an AD  Pt is currently functioning at a modified independent assistance level for bed mobility and transfers and sup level for ambulation without an AD  Use of surgical shoe on R to help even out LLD from darco and reduce exacerbation of back pain  Pt with no difficulty donning darco shoe on L  No gross LOB but did rely on support of wall/ furniture throughout entirety of session due to unsteadiness  Decline need for DME during evaluation however then later stated he would be interested in [facility] for home if appropriate  Pt will benefit from continued skilled IP PT to address the above mentioned impairments  in order to maximize recovery and increase functional independence when completing mobility and ADLs   At this time PT recommendations for d/c are home w OP v  PT pending wbs and progress post procedure  Barriers to Discharge: Inaccessible home environment  Barriers to Discharge Comments: will depend on wbs at [place]     PT Discharge Recommendation: Home with outpatient rehabilitation (pending progress/ wbs post op)          See flowsheet documentation for full assessment  4/27/2022 1348 by Edwin Tomlin, PT  Note: Prognosis: Fair  Problem List: Impaired balance,Impaired judgement,Decreased safety awareness,Impaired sensation,Decreased skin integrity,Pain,Orthopedic restrictions  Assessment: Corrie Jeong is a 59 y o  [email or web address] for Diabetic foot ulcer ([address] )  Pending washout and possible vac placement 4/28/22 w podiatry  Current pwb to L heel in darco shoe  PT was consulted and pt was seen on 4/27/2022 for mobility assessment and d/c planning  Pt presents w wb restrictions per podiatry, colt monitoring, subacute pain L foot and chronic pain of back  At baseline is indep for ADLs and ambulation without an AD  Pt is currently functioning at a modified independent assistance level for bed mobility and transfers and sup level for ambulation without an AD  Use of surgical shoe on R to help even out LLD from darco and reduce exacerbation of back pain  Pt with no difficulty donning darco shoe on L  No gross LOB but did rely on support of wall/ furniture throughout entirety of session due to unsteadiness  Decline need for DME during evaluation however then later stated he would be interested in [facility] for home if appropriate  Pt will benefit from continued skilled IP PT to address the above mentioned impairments  in order to maximize recovery and increase functional independence when completing mobility and ADLs  At this time PT recommendations for d/c are home w OP v [facility] PT pending wbs and progress post procedure    Barriers to Discharge: Inaccessible home environment  Barriers to Discharge Comments: will depend on wbs at dc     PT Discharge Recommendation: Home with outpatient rehabilitation (pending progress/ wbs post op)          See flowsheet documentation for full assessment

## 2022-04-27 NOTE — PLAN OF CARE
Problem: Potential for Falls  Goal: Patient will remain free of falls  Description: INTERVENTIONS:  - Educate patient/family on patient safety including physical limitations  - Instruct patient to call for assistance with activity   - Consult OT/PT to assist with strengthening/mobility   - Keep Call bell within reach  - Keep bed low and locked with side rails adjusted as appropriate  - Keep care items and personal belongings within reach  - Initiate and maintain comfort rounds  - Make Fall Risk Sign visible to staff  - Apply yellow socks and bracelet for high fall risk patients  - Consider moving patient to room near nurses station  Outcome: Progressing     Problem: PAIN - ADULT  Goal: Verbalizes/displays adequate comfort level or baseline comfort level  Description: Interventions:  - Encourage patient to monitor pain and request assistance  - Assess pain using appropriate pain scale  - Administer analgesics based on type and severity of pain and evaluate response  - Implement non-pharmacological measures as appropriate and evaluate response  - Consider cultural and social influences on pain and pain management  - Notify physician/advanced practitioner if interventions unsuccessful or patient reports new pain  Outcome: Progressing     Problem: INFECTION - ADULT  Goal: Absence or prevention of progression during hospitalization  Description: INTERVENTIONS:  - Assess and monitor for signs and symptoms of infection  - Monitor lab/diagnostic results  - Monitor all insertion sites, i e  indwelling lines, tubes, and drains  - Monitor endotracheal if appropriate and nasal secretions for changes in amount and color  - [place] appropriate cooling/warming therapies per order  - Administer medications as ordered  - Instruct and encourage patient and family to use good hand hygiene technique  - Identify and instruct in appropriate isolation precautions for identified infection/condition  Outcome: Progressing  Goal: Absence of fever/infection during neutropenic period  Description: INTERVENTIONS:  - Monitor WBC    Outcome: Progressing     Problem: SAFETY ADULT  Goal: Patient will remain free of falls  Description: INTERVENTIONS:  - Educate patient/family on patient safety including physical limitations  - Instruct patient to call for assistance with activity   - Consult OT/PT to assist with strengthening/mobility   - Keep Call bell within reach  - Keep bed low and locked with side rails adjusted as appropriate  - Keep care items and personal belongings within reach  - Initiate and maintain comfort rounds  - Make Fall Risk Sign visible to staff  - Apply yellow socks and bracelet for high fall risk patients  - Consider moving patient to room near nurses station  Outcome: Progressing  Goal: Maintain or return to baseline ADL function  Description: INTERVENTIONS:  -  Assess patient's ability to carry out ADLs; assess patient's baseline for ADL function and identify physical deficits which impact ability to perform ADLs (bathing, care of mouth/teeth, toileting, grooming, dressing, etc )  - Assess/evaluate cause of self-care deficits   - Assess range of motion  - Assess patient's mobility; develop plan if impaired  - Assess patient's need for assistive devices and provide as appropriate  - Encourage maximum independence but intervene and supervise when necessary  - Involve family in performance of ADLs  - Assess for home care needs following discharge   - Consider OT consult to assist with ADL evaluation and planning for discharge  - Provide patient education as appropriate  Outcome: Progressing  Goal: Maintains/Returns to pre admission functional level  Description: INTERVENTIONS:  - Perform BMAT or MOVE assessment daily    - Set and communicate daily mobility goal to care team and patient/family/caregiver     - Collaborate with rehabilitation services on mobility goals if consulted  - Out of bed for toileting  - Record patient progress and toleration of activity level   Outcome: Progressing     Problem: DISCHARGE PLANNING  Goal: Discharge to home or other facility with appropriate resources  Description: INTERVENTIONS:  - Identify barriers to discharge w/patient and caregiver  - Arrange for needed discharge resources and transportation as appropriate  - Identify discharge learning needs (meds, wound care, etc )  - Arrange for interpretive services to assist at discharge as needed  - Refer to Case Management Department for coordinating discharge planning if the patient needs post-hospital services based on physician/advanced practitioner order or complex needs related to functional status, cognitive ability, or social support system  Outcome: Progressing     Problem: Knowledge Deficit  Goal: Patient/family/caregiver demonstrates understanding of disease process, treatment plan, medications, and discharge instructions  Description: Complete learning assessment and assess knowledge base    Interventions:  - Provide teaching at level of understanding  - Provide teaching via preferred learning methods  Outcome: Progressing

## 2022-04-27 NOTE — PHYSICAL THERAPY NOTE
PHYSICAL THERAPY EVALUATION          Patient Name: Natan Hastings  [id number] Date: 4/27/2022   PT EVALUATION    59 y o     [id number]    Infected wound [T14  8XXA, L08 9]  Cellulitis [L03 90]  Foot pain [M79 673]  CKD (chronic kidney disease) [N18 9]  Diabetic foot ulcer ([facility] [address] ) [K30 968, L97 509]  Diabetic ulcer of left foot ([facility] [address] ) [B11 262, L97 529]    Past Medical History:   Diagnosis Date    H/O eye surgery     High blood sugar     Hypertension      Past Surgical History:   Procedure Laterality Date    HERNIA REPAIR      KIDNEY SURGERY      MOUTH SURGERY          04/27/22 1120   PT Last Visit   PT Visit Date 04/27/22   Note Type   Note type Evaluation   Pain Assessment   Pain Assessment Tool 0-10   Pain Score 3   Pain Location/Orientation Orientation: Left; Location: Foot   Hospital Pain Intervention(s) Repositioned; Ambulation/increased activity; Emotional support; Rest   Multiple Pain Sites Yes   Pain 2   Pain Score 2 4   Pain Location/Orientation 2 Location: Back;Orientation: Upper;Orientation: Lower   Pain Onset/Description 2 Frequency: Constant/Continuous   Hospital Pain Intervention(s) 2 Repositioned; Ambulation/increased activity; Emotional support; Rest   Restrictions/Precautions   Weight Bearing Precautions Per Order Yes   LLE Weight Bearing Per Order PWB  (wbat to L heel in toe unloading shoe)   Braces or Orthoses Other (Comment)  (toe unloading shoe)   Other Precautions WBS; Fall Risk;Pain   Home Living   Type of [address] [place] [address] Multi-level;Bed/bath upstairs;Stairs to enter without rails   Bathroom Shower/Tub Tub/shower unit   [facility]   Additional Comments 7 MIS no HR but pt reports "something to hold on to"  FOS to bedroom and bathroom  goes up/down steps daily to care for pets   reports first floor set up not possible   Prior Function   Level of [place] Independent with ADLs and functional mobility   Lives With Spouse   Falls in the last 6 months 1 to 4  (1 slip on ice)   Vocational On disability   Comments per patient, indep at baseline without an AD  wife works part time  +   General   Additional Pertinent History pt admitted 4/25/22 for diabetic foot ulcer  PWB to L heel in darco shoe  up w assist orders  scheduled for washout and debridement w possible vac placement 4/28  prev OP PT eval in March for comp spine program given chronic back pain  pmhx also significant for recent R eye surgery, CKD, DM, HTN   Cognition   Overall Cognitive Status WFL   Arousal/Participation Cooperative   Orientation Level Oriented X4   Memory Within functional limits   Following Commands Follows all commands and directions without difficulty   Subjective   Subjective "It hurts my back to walk in this shoe"   RLE Assessment   RLE Assessment WFL   LLE Assessment   LLE Assessment WFL   Coordination   Sensation X  (neuropathy ble ankles down L>R)   Bed Mobility   Supine to Sit 6  Modified independent   Additional items HOB elevated   Sit to Supine 6  Modified independent   Additional items HOB elevated   Transfers   Sit to Stand 6  Modified independent   Additional items Increased time required   Stand to Sit 6  Modified independent   Additional items Increased time required   Ambulation/Elevation   Gait pattern Improper Weight shift; Wide GLORIA  (unsteady)   Gait Assistance 5  Supervision   Assistive Device None  (decline need for DME  consistent furniture walking)   Distance >200'   Stair Management Assistance Not tested   Balance   Static Standing Fair   Dynamic Standing [address] -  (rely on support of wall/furniture throughout ambulation)   Endurance Deficit   Endurance Deficit No   Activity Tolerance   Activity Tolerance Patient tolerated treatment well   Forrest Herrera; OT   Nurse Made Aware Patricia RN   Assessment   Prognosis Fair   Problem List Impaired balance; Impaired judgement;Decreased safety awareness; Impaired sensation;Decreased skin integrity;Pain;Orthopedic restrictions   Assessment Live Cast is a 59 y o  male admitted to [facility] on 4/25/2022 for Diabetic foot ulcer ([address] )  Pending washout and possible vac placement 4/28/22 w podiatry  Current pwb to L heel in darco shoe  PT was consulted and pt was seen on 4/27/2022 for mobility assessment and d/c planning  Pt presents w wb restrictions per podiatry, colt monitoring, subacute pain L foot and chronic pain of back  At baseline is indep for ADLs and ambulation without an AD  Pt is currently functioning at a modified independent assistance level for bed mobility and transfers and sup level for ambulation without an AD  Use of surgical shoe on R to help even out LLD from darco and reduce exacerbation of back pain  Pt with no difficulty donning darco shoe on L  No gross LOB but did rely on support of wall/ furniture throughout entirety of session due to unsteadiness  Decline need for DME during evaluation however then later stated he would be interested in [facility] for home if appropriate  Pt will benefit from continued skilled IP PT to address the above mentioned impairments  in order to maximize recovery and increase functional independence when completing mobility and ADLs  At this time PT recommendations for d/c are home w OP v [facility] PT pending wbs and progress post procedure  Barriers to Discharge Inaccessible home environment   Barriers to Discharge Comments will depend on wbs at dc   Goals   Patient Goals be able to walk post procedure   STG Expiration Date 05/07/22   Short Term Goal #1 1)  Pt will perform bed mobility indep demonstrating appropriate technique 100% of the time in order to improve function  2)  Perform all transfers indep demonstrating safe and appropriate technique 100% of the time in order to improve ability to negotiate safely in home environment  3) Amb with least restrictive AD > 300'x1 with mod I in order to demonstrate ability to negotiate in home environment  4)  Improve overall strength and balance 1/2 grade in order to optimize ability to perform functional tasks and reduce fall risk  5) Increase activity tolerance to 45 minutes in order to improve endurance to functional tasks  6)  Negotiate stairs using most appropriate technique and S in order to be able to negotiate safely in home environment  7) PT for ongoing patient and family/caregiver education, DME needs and d/c planning in order to promote highest level of function in least restrictive environment  Plan   Treatment/Interventions Functional transfer training;LE strengthening/ROM; Elevations; Therapeutic exercise;Patient/family training;Equipment eval/education; Bed mobility;Gait training; Compensatory technique education;Continued evaluation;Spoke to nursing;OT   PT Frequency 2-3x/wk   Recommendation   PT Discharge Recommendation Home with outpatient rehabilitation  (pending progress/ wbs post op)   Additional Comments The patient's AM-PAC Basic Mobility Inpatient Short Form Raw Score is 22  A Raw score of greater than 16 suggests the patient may benefit from discharge to home  Please also refer to the recommendation of the Physical Therapist for safe discharge planning     AM-PAC Basic Mobility Inpatient   Turning in Bed Without Bedrails 4   Lying on Back to Sitting on Edge of Flat Bed 4   Moving Bed to Chair 4   Standing Up From Chair 4   Walk in Room 3   Climb 3-5 Stairs 3   Basic Mobility Inpatient Raw Score 22   Basic Mobility Standardized Score 47 4   Highest Level Of Mobility   JH-HLM Goal 7: Walk 25 feet or more   JH-HLM Highest Level of Mobility 7: Walk 25 feet or more   JH-HLM Goal Achieved Yes   History: co - morbidities, social background, fall risk, wbs  Exam: impairments in systems including musculoskeletal (strength), neuromuscular (balance, gait, transfers, motor function and sensation), am-pac, cognition  Clinical: unstable/unpredictable  Complexity:high      Giacomo Cull, PT

## 2022-04-27 NOTE — ASSESSMENT & PLAN NOTE
Patient with recent surgery to his right eye  Family members are bring an eyedrop today  He is having some mild pain, full ROM and vision intact   Conjunctiva is erythematous he states this is chronic, no swelling or surrounding erythema, no drainage   Will call his primary ophthalmologist to review per patient request

## 2022-04-27 NOTE — ANESTHESIA PREPROCEDURE EVALUATION
Procedure:  LOWER EXTREMITY I&D (Left Foot)    Relevant Problems   CARDIO   (+) Essential hypertension      /RENAL   (+) Chronic kidney disease, stage 3 (HCC)   (+) Stage 2 chronic kidney disease      MUSCULOSKELETAL   (+) Low back pain with sciatica      Other   (+) Class 2 severe obesity with serious comorbidity and body mass index (BMI) of 37 0 to 37 9 in adult (HCC)   (+) Diabetes mellitus (HCC)   (+) Diabetic foot ulcer (HCC)   (+) Neuropathy        Physical Exam    Airway    Mallampati score: II  TM Distance: >3 FB  Neck ROM: full     Dental   upper dentures,     Cardiovascular  Rhythm: regular, Rate: normal, Cardiovascular exam normal    Pulmonary  Pulmonary exam normal Breath sounds clear to auscultation,     Other Findings        Anesthesia Plan  ASA Score- 3     Anesthesia Type- general with ASA Monitors  Additional Monitors:   Airway Plan: LMA  Comment: Anxious    Plan Factors-Exercise tolerance (METS): >4 METS  Chart reviewed  Existing labs reviewed  Patient is not a current smoker  Obstructive sleep apnea risk education given perioperatively  Induction- intravenous  Postoperative Plan-     Informed Consent- Anesthetic plan and risks discussed with patient

## 2022-04-27 NOTE — QUICK NOTE
Spoke with patient's Ophthalmology team   Patient has ongoing right eye irritation since his cataract surgery  Recommendation per my discussion is continue with artificial tears to avoid further irritation  He should continue on the gentamicin b i d  As ordered  Per patient this morning he was going to have his significant other bring in loteprednol drops as well, non-formulary order placed

## 2022-04-27 NOTE — TREATMENT PLAN
Left foot MRI reviewed with fluid collection noted at the plantar 5th MTPJ without definitive signs of osteomyelitis  Will plan for left foot washout and possible wound vac application tomorrow afternoon, 4/28/22 with Dr Gilmar Ferguson  NPO midnight  Findings and plan discussed with the patient, all questions and concerns addressed  Patient is amenable to the plan  Formal consent to be obtained pre-operatively with surgeon

## 2022-04-27 NOTE — QUICK NOTE
Called and left message with his Ophthalmologist, Dr Reina Fraser, awaiting call back   [phone]

## 2022-04-27 NOTE — NURSING NOTE
Accidentally completed CHG bath, day of surgery iodine swab both nostrils, and warming interventions orders for procedure tomorrow  Made next nurse aware of the orders that need to be performed for patient  Will make a note reminder in sticky note of chart      Aiden Tomlinson RN

## 2022-04-27 NOTE — PROGRESS NOTES
Progress Note - Infectious Disease   Natan Hastings 59 y o  male MRN: [id number]  Unit/Bed#: [address] 224-01 Encounter: [id number]      Impression/Plan:  1  Infected diabetic foot ulcer-persistent despite outpatient oral antibiotics  Outpatient wound culture grew an E coli and mixed skin mendez  Patient hemodynamically stable  Blood cultures no growth  His antibiotics are limited because he has had a severe rash with cephalosporins, however he tolerates penicillins without difficulty  MRI of the foot shows a fluid collection at the plantar 5th MTPJ without definitive signs of osteomyelitis  -continue Unasyn and doxycycline for now  -OR tomorrow for debridemnt with Podiatry, will follow up cultures from definitive management  -recheck CBC with diff and BMP  -local wound care  -close podiatry follow-up     2  Diabetes mellitus-type 2  Without long-term insulin use    3  Chronic kidney disease stage 3  Creatinine elevated on admission, now improving   -recheck BMP  -dose adjusted antibiotics as needed  -volume management     4  Obesity-with a body mass index of greater than 37  Risk factor for complicating infection and relapse   -stress weight loss through diet and exercise    Discussed the above management plan with the primary service  ID will follow  Antibiotics:  Unasyn day 3  Doxycycline day 3    Subjective:  Patient feeling okay, denied fever, chills, chest pain, shortness of breath  Tolerating antibiotics  Feels that foot pain is improving with antibiotics  Objective:  Vitals:  Temp:  [97 3 °F (36 3 °C)-98 3 °F (36 8 °C)] 97 4 °F (36 3 °C)  HR:  [71-81] 74  Resp:  [18] 18  BP: (118-131)/(81-86) 123/83  SpO2:  [90 %-93 %] 93 %  Temp (24hrs), Av 6 °F (36 4 °C), Min:97 3 °F (36 3 °C), Max:98 3 °F (36 8 °C)  Current: Temperature: (!) 97 4 °F (36 3 °C)    Physical Exam:   General Appearance:  Alert, interactive, nontoxic, no acute distress  Throat: Oropharynx moist without lesions      Lungs:   Clear to auscultation bilaterally; no wheezes, rhonchi or rales; respirations unlabored   Heart:  RRR; no murmur, rub or gallop   Abdomen:   Soft, non-tender, non-distended, positive bowel sounds  Extremities: No clubbing, cyanosis  Left foot dressed with a dry dressing in place  Reviewed new images in epic with notable erythema on the dorsum of the foot and necrotic wound  Skin: No new rashes or lesions  No draining wounds noted  Labs, Imaging, & Other studies:   All pertinent labs and imaging studies were personally reviewed  Results from last 7 days   Lab Units 04/27/22  0408 04/26/22  0415 04/25/22  1433 04/25/22  0941 04/25/22  0941   WBC Thousand/uL 3 87* 4 51  --   --  4 77   HEMOGLOBIN g/dL 11 5* 12 4  --   --  12 7   PLATELETS Thousands/uL 187 188 189   < > 180    < > = values in this interval not displayed  Results from last 7 days   Lab Units 04/27/22  0408 04/26/22  0415 04/26/22  0415 04/25/22  0941 04/25/22  0941   SODIUM mmol/L 137  --  134*  --  135*   POTASSIUM mmol/L 4 5   < > 4 7   < > 4 5   CHLORIDE mmol/L 103   < > 101   < > 99*   CO2 mmol/L 26   < > 26   < > 26   BUN mg/dL 35*   < > 33*   < > 31*   CREATININE mg/dL 1 57*   < > 1 76*   < > 1 69*   EGFR ml/min/1 73sq m 47   < > 41   < > 43   CALCIUM mg/dL 8 7   < > 9 4   < > 8 7    < > = values in this interval not displayed  Results from last 7 days   Lab Units 04/25/22  1128 04/25/22  0939   BLOOD CULTURE  No Growth at 48 hrs  No Growth at 48 hrs  Images in PACS personally reviewed  MRI left foot:  Small plantar abscess base of 5th toe       Findings of osteitis and probably superimposed stress response 5th toe   No specific findings of osteomyelitis   No fracture

## 2022-04-27 NOTE — PROGRESS NOTES
[address] [place] [address]  Progress Note Rosendo Winter 1965, 59 y o  male MRN: [id number]  Unit/Bed#: [address] 224-01 Encounter: [id number]  Primary Care Provider: Larry Garcia MD   Date and time admitted to hospital: 4/25/2022  9:16 AM    * Diabetic foot ulcer ([address] )  Assessment & Plan  Diabetic left foot ulcer with purulent drainage and surrounding cellulitis  MRI left foot: small plantar abscess base of 5th toe, findings of osteitis, no specific OM, no fracture   Appreciate podiatry recommendations  Will follow up with further recs today   weight bearing status: Partial weight bearing to LLE with darco wedge   Appreciate Infectious Disease consult recommendations, continue Unasyn and oral doxycycline  Blood cultures negative x 24 hours   Monitor fever curve, currently hemodynamically stable and nontoxic    H/O eye surgery  Assessment & Plan  Patient with recent surgery to his right eye  Family members are bring an eyedrop today  He is having some mild pain, full ROM and vision intact   Conjunctiva is erythematous he states this is chronic, no swelling or surrounding erythema, no drainage   Will call his primary ophthalmologist to review per patient request       Chronic kidney disease, stage 3 [facility])  Assessment & Plan  Lab Results   Component Value Date    EGFR 47 04/27/2022    EGFR 41 04/26/2022    EGFR 43 04/25/2022    CREATININE 1 57 (H) 04/27/2022    CREATININE 1 76 (H) 04/26/2022    CREATININE 1 69 (H) 04/25/2022   baseline creatinine is 1 4-1 6     Monitor renal function daily  Holding HCTZ lisinopril    Class 2 severe obesity with serious comorbidity and body mass index (BMI) of 37 0 to 37 9 in adult [facility])  Assessment & Plan  Encourage diet and weight loss  BMI 37 8 noted    Diabetes mellitus [facility])  Assessment & Plan  Lab Results   Component Value Date    HGBA1C 6 6 (H) 04/26/2022       Recent Labs     04/26/22  1123 04/26/22  1637 04/26/22  2108 04/27/22  0726   POCGLU 164* 148* 147* 168*       Blood Sugar Average: Last 72 hrs:  (P) 146   Updated A1c 6 6%  Pt declines insulin  Will not start insulin sliding scale  He wants to remain on jardiance  Previous provider discussed with pt about the risk of continuing jardiance with possible requirement of surgery and if creatinine worsens  Pt wishes to continue jardiance at this time despite those risks  He is currently stable and no surgical procedure planned at this time  Will continue it but will stop 24 hours prior to surgery or if creatinine continues to climb  Will hold jardiance this evening in case podiatry plans for intervention tomorrow       Essential hypertension  Assessment & Plan  Continue Coreg  Holding HCTZ and lisinopril with low-normal blood pressure  Monitor vital signs and avoid hypotension        VTE Pharmacologic Prophylaxis: VTE Score: 3 patient refused heparin     Patient Centered Rounds: I performed bedside rounds with nursing staff today  Discussions with Specialists or Other Care Team Provider: will review podiatry recs     Education and Discussions with Family / Patient: Patient declined call to   Time Spent for Care: 20 minutes  More than 50% of total time spent on counseling and coordination of care as described above  Current Length of Stay: 2 day(s)  Current Patient Status: Inpatient   Certification Statement: The patient will continue to require additional inpatient hospital stay due to diabetic ulcer left foot   Discharge Plan: Anticipate discharge in >72 hrs to discharge location to be determined pending rehab evaluations  Code Status: Level 1 - Full Code    Subjective:   Patient resting in bed  Slept better last night  No chest pain or SOB  No fevers, chills  He recently had surgery on his right eye and is having some mild pain  States chronically erythematous, he has full range of motion and full vision no surrounding erythema or swelling    he states his significant other as brain and his eyedrops from his primary ophthalmology office today  Objective:     Vitals:   Temp (24hrs), Av °F (36 7 °C), Min:97 3 °F (36 3 °C), Max:98 5 °F (36 9 °C)    Temp:  [97 3 °F (36 3 °C)-98 5 °F (36 9 °C)] 97 3 °F (36 3 °C)  HR:  [81-86] 81  Resp:  [18] 18  BP: (108-131)/(66-86) 118/81  SpO2:  [92 %-94 %] 92 %  Body mass index is 37 88 kg/m²  Input and Output Summary (last 24 hours):   No intake or output data in the 24 hours ending 22 9685    Physical Exam:   Physical Exam  Vitals and nursing note reviewed  Constitutional:       General: He is not in acute distress  Appearance: He is not toxic-appearing  HENT:      Head: Normocephalic  Eyes:      Extraocular Movements: Extraocular movements intact  Comments: Conjunctiva erythematous right eye, full ROM, no drainage or purulence , no surrounding swelling    Cardiovascular:      Rate and Rhythm: Normal rate and regular rhythm  Pulmonary:      Effort: Pulmonary effort is normal  No respiratory distress  Breath sounds: Normal breath sounds  No wheezing  Abdominal:      General: Bowel sounds are normal       Palpations: Abdomen is soft  Musculoskeletal:         General: Deformity (left foot dressing ) present  Right lower leg: No edema  Left lower leg: No edema  Neurological:      Mental Status: He is alert  Mental status is at baseline     Psychiatric:         Mood and Affect: Mood normal             Additional Data:     Labs:  Results from last 7 days   Lab Units 22  0408   WBC Thousand/uL 3 87*   HEMOGLOBIN g/dL 11 5*   HEMATOCRIT % 36 0*   PLATELETS Thousands/uL 187   NEUTROS PCT % 49   LYMPHS PCT % 29   MONOS PCT % 15*   EOS PCT % 6     Results from last 7 days   Lab Units 22  0408   SODIUM mmol/L 137   POTASSIUM mmol/L 4 5   CHLORIDE mmol/L 103   CO2 mmol/L 26   BUN mg/dL 35*   CREATININE mg/dL 1 57*   ANION GAP mmol/L 8   CALCIUM mg/dL 8 7   GLUCOSE RANDOM mg/dL 110         Results from last 7 days   Lab Units 04/27/22  0726 04/26/22  2108 04/26/22  1637 04/26/22  1123 04/26/22  0718 04/25/22  2103 04/25/22  1623   POC GLUCOSE mg/dl 168* 147* 148* 164* 107 126 162*     Results from last 7 days   Lab Units 04/26/22  0415   HEMOGLOBIN A1C % 6 6*           Lines/Drains:  Invasive Devices  Report    Peripheral Intravenous Line            Peripheral IV 04/25/22 Left Antecubital 1 day                      Imaging: Reviewed radiology reports from this admission including: MRI left foot     Recent Cultures (last 7 days):   Results from last 7 days   Lab Units 04/25/22  1128 04/25/22  0939   BLOOD CULTURE  No Growth at 24 hrs  No Growth at 24 hrs  Last 24 Hours Medication List:   Current Facility-Administered Medications   Medication Dose Route Frequency Provider Last Rate    acetaminophen  650 mg Oral Q6H PRN Virgene Maria Elena, DO      ampicillin-sulbactam  3 g Intravenous Q6H Rene Estrada MD 3 g (04/27/22 0352)    carvedilol  25 mg Oral BID With Meals Virgene Maria Elena, DO      doxycycline hyclate  100 mg Oral Q12H [address] Rene Estrada MD      gentamicin  1 drop Right Eye BID Sanjuanita Ambron, DO      HYDROmorphone  0 5 mg Intravenous Q4H PRN Sanjuanita Ambron, DO      melatonin  6 mg Oral HS Rafaela Reyes PA-C      NON FORMULARY   Right Eye BID Rafaela Reyes PA-C      ondansetron  4 mg Intravenous Q6H PRN Sanjuanita Ambron, DO      pregabalin  75 mg Oral Daily Sanjuanita Ambron, DO      traMADol  50 mg Oral Q6H PRN Stanford Sanabria PA-C          Today, Patient Was Seen By: Stanford Sanabria PA-C    **Please Note: This note may have been constructed using a voice recognition system  **

## 2022-04-28 ENCOUNTER — ANESTHESIA (INPATIENT)
Dept: PERIOP | Facility: HOSPITAL | Age: 57
DRG: 623 | End: 2022-04-28
Payer: MEDICARE

## 2022-04-28 LAB
ANION GAP SERPL CALCULATED.3IONS-SCNC: 9 MMOL/L (ref 4–13)
BASOPHILS # BLD AUTO: 0.04 THOUSANDS/ΜL (ref 0–0.1)
BASOPHILS NFR BLD AUTO: 1 % (ref 0–1)
BUN SERPL-MCNC: 38 MG/DL (ref 5–25)
CALCIUM SERPL-MCNC: 8.8 MG/DL (ref 8.3–10.1)
CHLORIDE SERPL-SCNC: 101 MMOL/L (ref 100–108)
CO2 SERPL-SCNC: 27 MMOL/L (ref 21–32)
CREAT SERPL-MCNC: 1.52 MG/DL (ref 0.6–1.3)
EOSINOPHIL # BLD AUTO: 0.25 THOUSAND/ΜL (ref 0–0.61)
EOSINOPHIL NFR BLD AUTO: 6 % (ref 0–6)
ERYTHROCYTE [DISTWIDTH] IN BLOOD BY AUTOMATED COUNT: 12.5 % (ref 11.6–15.1)
GFR SERPL CREATININE-BSD FRML MDRD: 49 ML/MIN/1.73SQ M
GLUCOSE SERPL-MCNC: 114 MG/DL (ref 65–140)
GLUCOSE SERPL-MCNC: 114 MG/DL (ref 65–140)
GLUCOSE SERPL-MCNC: 120 MG/DL (ref 65–140)
GLUCOSE SERPL-MCNC: 142 MG/DL (ref 65–140)
GLUCOSE SERPL-MCNC: 144 MG/DL (ref 65–140)
GLUCOSE SERPL-MCNC: 168 MG/DL (ref 65–140)
HCT VFR BLD AUTO: 37.3 % (ref 36.5–49.3)
HGB BLD-MCNC: 12.6 G/DL (ref 12–17)
IMM GRANULOCYTES # BLD AUTO: 0.01 THOUSAND/UL (ref 0–0.2)
IMM GRANULOCYTES NFR BLD AUTO: 0 % (ref 0–2)
LYMPHOCYTES # BLD AUTO: 1.34 THOUSANDS/ΜL (ref 0.6–4.47)
LYMPHOCYTES NFR BLD AUTO: 31 % (ref 14–44)
MCH RBC QN AUTO: 32.1 PG (ref 26.8–34.3)
MCHC RBC AUTO-ENTMCNC: 33.8 G/DL (ref 31.4–37.4)
MCV RBC AUTO: 95 FL (ref 82–98)
MONOCYTES # BLD AUTO: 0.53 THOUSAND/ΜL (ref 0.17–1.22)
MONOCYTES NFR BLD AUTO: 12 % (ref 4–12)
NEUTROPHILS # BLD AUTO: 2.15 THOUSANDS/ΜL (ref 1.85–7.62)
NEUTS SEG NFR BLD AUTO: 50 % (ref 43–75)
NRBC BLD AUTO-RTO: 0 /100 WBCS
PLATELET # BLD AUTO: 191 THOUSANDS/UL (ref 149–390)
PMV BLD AUTO: 10 FL (ref 8.9–12.7)
POTASSIUM SERPL-SCNC: 4.6 MMOL/L (ref 3.5–5.3)
RBC # BLD AUTO: 3.93 MILLION/UL (ref 3.88–5.62)
SODIUM SERPL-SCNC: 137 MMOL/L (ref 136–145)
WBC # BLD AUTO: 4.32 THOUSAND/UL (ref 4.31–10.16)

## 2022-04-28 PROCEDURE — 87205 SMEAR GRAM STAIN: CPT | Performed by: PODIATRIST

## 2022-04-28 PROCEDURE — 87075 CULTR BACTERIA EXCEPT BLOOD: CPT | Performed by: PODIATRIST

## 2022-04-28 PROCEDURE — 0JBR0ZZ EXCISION OF LEFT FOOT SUBCUTANEOUS TISSUE AND FASCIA, OPEN APPROACH: ICD-10-PCS | Performed by: PODIATRIST

## 2022-04-28 PROCEDURE — 87186 SC STD MICRODIL/AGAR DIL: CPT | Performed by: PODIATRIST

## 2022-04-28 PROCEDURE — 87077 CULTURE AEROBIC IDENTIFY: CPT | Performed by: PODIATRIST

## 2022-04-28 PROCEDURE — 80048 BASIC METABOLIC PNL TOTAL CA: CPT | Performed by: INTERNAL MEDICINE

## 2022-04-28 PROCEDURE — 99231 SBSQ HOSP IP/OBS SF/LOW 25: CPT | Performed by: STUDENT IN AN ORGANIZED HEALTH CARE EDUCATION/TRAINING PROGRAM

## 2022-04-28 PROCEDURE — 85025 COMPLETE CBC W/AUTO DIFF WBC: CPT | Performed by: INTERNAL MEDICINE

## 2022-04-28 PROCEDURE — 82948 REAGENT STRIP/BLOOD GLUCOSE: CPT

## 2022-04-28 PROCEDURE — 87070 CULTURE OTHR SPECIMN AEROBIC: CPT | Performed by: PODIATRIST

## 2022-04-28 PROCEDURE — 99232 SBSQ HOSP IP/OBS MODERATE 35: CPT | Performed by: INTERNAL MEDICINE

## 2022-04-28 RX ORDER — HYDROMORPHONE HCL/PF 1 MG/ML
0.5 SYRINGE (ML) INJECTION
Status: DISCONTINUED | OUTPATIENT
Start: 2022-04-28 | End: 2022-04-28 | Stop reason: HOSPADM

## 2022-04-28 RX ORDER — PROPOFOL 10 MG/ML
INJECTION, EMULSION INTRAVENOUS AS NEEDED
Status: DISCONTINUED | OUTPATIENT
Start: 2022-04-28 | End: 2022-04-28

## 2022-04-28 RX ORDER — ONDANSETRON 2 MG/ML
4 INJECTION INTRAMUSCULAR; INTRAVENOUS ONCE AS NEEDED
Status: DISCONTINUED | OUTPATIENT
Start: 2022-04-28 | End: 2022-04-28 | Stop reason: HOSPADM

## 2022-04-28 RX ORDER — FENTANYL CITRATE/PF 50 MCG/ML
25 SYRINGE (ML) INJECTION
Status: DISCONTINUED | OUTPATIENT
Start: 2022-04-28 | End: 2022-04-28 | Stop reason: HOSPADM

## 2022-04-28 RX ORDER — FENTANYL CITRATE 50 UG/ML
INJECTION, SOLUTION INTRAMUSCULAR; INTRAVENOUS AS NEEDED
Status: DISCONTINUED | OUTPATIENT
Start: 2022-04-28 | End: 2022-04-28

## 2022-04-28 RX ORDER — MIDAZOLAM HYDROCHLORIDE 2 MG/2ML
INJECTION, SOLUTION INTRAMUSCULAR; INTRAVENOUS AS NEEDED
Status: DISCONTINUED | OUTPATIENT
Start: 2022-04-28 | End: 2022-04-28

## 2022-04-28 RX ORDER — ONDANSETRON 2 MG/ML
INJECTION INTRAMUSCULAR; INTRAVENOUS AS NEEDED
Status: DISCONTINUED | OUTPATIENT
Start: 2022-04-28 | End: 2022-04-28

## 2022-04-28 RX ORDER — PROMETHAZINE HYDROCHLORIDE 25 MG/ML
12.5 INJECTION, SOLUTION INTRAMUSCULAR; INTRAVENOUS ONCE AS NEEDED
Status: DISCONTINUED | OUTPATIENT
Start: 2022-04-28 | End: 2022-04-28 | Stop reason: HOSPADM

## 2022-04-28 RX ORDER — LIDOCAINE HYDROCHLORIDE 20 MG/ML
INJECTION, SOLUTION EPIDURAL; INFILTRATION; INTRACAUDAL; PERINEURAL AS NEEDED
Status: DISCONTINUED | OUTPATIENT
Start: 2022-04-28 | End: 2022-04-28

## 2022-04-28 RX ORDER — ALBUTEROL SULFATE 2.5 MG/3ML
2.5 SOLUTION RESPIRATORY (INHALATION) ONCE AS NEEDED
Status: DISCONTINUED | OUTPATIENT
Start: 2022-04-28 | End: 2022-04-28 | Stop reason: HOSPADM

## 2022-04-28 RX ADMIN — FENTANYL CITRATE 50 MCG: 50 INJECTION INTRAMUSCULAR; INTRAVENOUS at 13:21

## 2022-04-28 RX ADMIN — DOXYCYCLINE 100 MG: 100 CAPSULE ORAL at 21:46

## 2022-04-28 RX ADMIN — LOTEPREDNOL ETABONATE: 10 SUSPENSION TOPICAL at 08:17

## 2022-04-28 RX ADMIN — DOXYCYCLINE 100 MG: 100 CAPSULE ORAL at 08:18

## 2022-04-28 RX ADMIN — LOTEPREDNOL ETABONATE: 10 SUSPENSION TOPICAL at 21:43

## 2022-04-28 RX ADMIN — MIDAZOLAM 2 MG: 1 INJECTION INTRAMUSCULAR; INTRAVENOUS at 13:16

## 2022-04-28 RX ADMIN — GENTAMICIN SULFATE 1 DROP: 3 SOLUTION/ DROPS OPHTHALMIC at 08:16

## 2022-04-28 RX ADMIN — HYDROMORPHONE HYDROCHLORIDE 0.5 MG: 1 INJECTION, SOLUTION INTRAMUSCULAR; INTRAVENOUS; SUBCUTANEOUS at 05:49

## 2022-04-28 RX ADMIN — TRAMADOL HYDROCHLORIDE 50 MG: 50 TABLET, COATED ORAL at 21:45

## 2022-04-28 RX ADMIN — AMPICILLIN SODIUM AND SULBACTAM SODIUM 3 G: 10; 5 INJECTION, POWDER, FOR SOLUTION INTRAVENOUS at 14:45

## 2022-04-28 RX ADMIN — SODIUM CHLORIDE 75 ML/HR: 0.9 INJECTION, SOLUTION INTRAVENOUS at 05:49

## 2022-04-28 RX ADMIN — CARVEDILOL 25 MG: 25 TABLET, FILM COATED ORAL at 21:46

## 2022-04-28 RX ADMIN — AMPICILLIN SODIUM AND SULBACTAM SODIUM 3 G: 10; 5 INJECTION, POWDER, FOR SOLUTION INTRAVENOUS at 08:17

## 2022-04-28 RX ADMIN — LIDOCAINE HYDROCHLORIDE 100 MG: 20 INJECTION, SOLUTION EPIDURAL; INFILTRATION; INTRACAUDAL; PERINEURAL at 13:21

## 2022-04-28 RX ADMIN — AMPICILLIN SODIUM AND SULBACTAM SODIUM 3 G: 10; 5 INJECTION, POWDER, FOR SOLUTION INTRAVENOUS at 21:56

## 2022-04-28 RX ADMIN — PROPOFOL 200 MG: 10 INJECTION, EMULSION INTRAVENOUS at 13:21

## 2022-04-28 RX ADMIN — ONDANSETRON 8 MG: 2 INJECTION INTRAMUSCULAR; INTRAVENOUS at 13:21

## 2022-04-28 RX ADMIN — PREGABALIN 75 MG: 75 CAPSULE ORAL at 21:46

## 2022-04-28 RX ADMIN — MELATONIN 6 MG: at 21:46

## 2022-04-28 RX ADMIN — CARVEDILOL 25 MG: 25 TABLET, FILM COATED ORAL at 08:18

## 2022-04-28 RX ADMIN — GENTAMICIN SULFATE 1 DROP: 3 SOLUTION/ DROPS OPHTHALMIC at 21:31

## 2022-04-28 RX ADMIN — ACETAMINOPHEN 325MG 650 MG: 325 TABLET ORAL at 21:46

## 2022-04-28 RX ADMIN — AMPICILLIN SODIUM AND SULBACTAM SODIUM 3 G: 10; 5 INJECTION, POWDER, FOR SOLUTION INTRAVENOUS at 03:25

## 2022-04-28 NOTE — ASSESSMENT & PLAN NOTE
Diabetic left foot ulcer with purulent drainage and surrounding cellulitis  MRI left foot: small plantar abscess base of 5th toe, findings of osteitis, no specific OM, no fracture   Appreciate podiatry recommendations  S/p left foot washout: discussed wound vac placement but at this time he hasn't required wound vac    weight bearing status: Partial weight bearing to LLE with darco wedge   Appreciate Infectious Disease consult recommendations, continue Unasyn and oral doxycycline  Blood cultures negative x 72 hours     Monitor fever curve, currently hemodynamically stable and nontoxic

## 2022-04-28 NOTE — PROGRESS NOTES
Progress Note - Infectious Disease   Romy Pop 59 y o  male MRN: [id number]  Unit/Bed#: [address] 224-01 Encounter: [id number]      Impression/Plan:    1  Infected left diabetic foot ulcer-persistent despite outpatient oral antibiotics  Outpatient wound culture grew an E coli and mixed skin mendez  Patient hemodynamically stable  Blood cultures no growth  His antibiotics are limited because he has had a severe rash with cephalosporins, however he tolerates penicillins without difficulty  MRI of the foot shows a fluid collection at the plantar 5th MTPJ without definitive signs of osteomyelitis  Status post OR debridement today of necrotic wound  No purulence or sinus tracts seen, underlying tissue and bone appeared healthy   -continue Unasyn and doxycycline for now  -follow up OR cultures  -recheck CBC with diff and BMP  -local wound care  -close podiatry follow-up     2  Diabetes mellitus-type 2  Without long-term insulin use    3  Chronic kidney disease stage 3  Creatinine elevated on admission, now improving   -recheck BMP  -dose adjusted antibiotics as needed  -volume management     4  Obesity-with a body mass index of greater than 37  Risk factor for complicating infection and relapse   -stress weight loss through diet and exercise    Discussed the above management plan with the primary service  ID will follow  Antibiotics:  Unasyn day 4  Doxycycline day 4    Subjective:  Status post debridement of left foot ulcer in OR today  Patient is having pain in the foot after surgery  Denies fever, chills, chest pain, abdominal pain  Objective:  Vitals:  Temp:  [97 °F (36 1 °C)-98 3 °F (36 8 °C)] 98 °F (36 7 °C)  HR:  [73-89] 86  Resp:  [16-18] 18  BP: (126-148)/(71-95) 146/91  SpO2:  [90 %-95 %] 93 %  Temp (24hrs), Av 5 °F (36 4 °C), Min:97 °F (36 1 °C), Max:98 3 °F (36 8 °C)  Current: Temperature: 98 °F (36 7 °C)    Physical Exam:   General Appearance:  Alert, interactive, nontoxic, no acute distress  Throat: Oropharynx moist without lesions  Lungs:   Clear to auscultation bilaterally; no wheezes, rhonchi or rales; respirations unlabored   Heart:  RRR; no murmur, rub or gallop   Abdomen:   Soft, non-tender, non-distended, positive bowel sounds  Extremities: No clubbing, cyanosis  Left foot dressed with a dry dressing in place  Reviewed new images in epic with notable erythema on the dorsum of the foot and necrotic wound  Skin: No new rashes or lesions  No draining wounds noted  Labs, Imaging, & Other studies:   All pertinent labs and imaging studies were personally reviewed  Results from last 7 days   Lab Units 04/28/22  0335 04/27/22  0408 04/26/22  0415   WBC Thousand/uL 4 32 3 87* 4 51   HEMOGLOBIN g/dL 12 6 11 5* 12 4   PLATELETS Thousands/uL 191 187 188     Results from last 7 days   Lab Units 04/28/22  0335 04/27/22  0408 04/27/22  0408 04/26/22  0415 04/26/22  0415   SODIUM mmol/L 137  --  137  --  134*   POTASSIUM mmol/L 4 6   < > 4 5   < > 4 7   CHLORIDE mmol/L 101   < > 103   < > 101   CO2 mmol/L 27   < > 26   < > 26   BUN mg/dL 38*   < > 35*   < > 33*   CREATININE mg/dL 1 52*   < > 1 57*   < > 1 76*   EGFR ml/min/1 73sq m 49   < > 47   < > 41   CALCIUM mg/dL 8 8   < > 8 7   < > 9 4    < > = values in this interval not displayed  Results from last 7 days   Lab Units 04/25/22  1128 04/25/22  0939   BLOOD CULTURE  No Growth at 72 hrs  No Growth at 72 hrs  Images in PACS personally reviewed  MRI left foot:  Small plantar abscess base of 5th toe       Findings of osteitis and probably superimposed stress response 5th toe   No specific findings of osteomyelitis   No fracture

## 2022-04-28 NOTE — PROGRESS NOTES
Podiatry - Progress Note  Patient: Horacio Betts 59 y o  male   MRN: [id number]  PCP: Viola Kumar MD  Unit/Bed#: [address] [place] [address] 224-01 Encounter: [id number]  Date Of Visit: 22    ASSESSMENT:    Horacio Betts is a 59 y o  male with:    1  Left plantar foot ulceration  2  Left 3rd interdigital space abrasion  3  L foot cellulitis  4  T2DM  5  CKD 2  6  Lumbar radiculopathy    PLAN:    · Patient to go to OR today,22, for  Left foot incision and drainage with removal of all non-viable soft-tissue and bone and possible wound VAC application with Dr Promise Malik  · Consent signed by patient and resident  · Confirmed NPO status  · H&P, vitals, and current labs reviewed  No acute changes noted  · Alternatives, risks, and complications discussed with patient  · All questions answered  No guarantees given of outcome  · Rest of medical care per primary team        SUBJECTIVE:     The patient was seen, evaluated, and assessed at bedside today  The patient was awake, alert, and in no acute distress  Patient confirmed NPO status  All questions and concerns regarding the surgical procedure addressed  Patient understands risks vs benefits of procedure and remains amenable with plan for surgery today  Patient denies N/V/F/chills/SOB/CP  OBJECTIVE:     Vitals:   /84 (BP Location: Left arm)   Pulse 79   Temp (!) 97 2 °F (36 2 °C)   Resp 18   Ht 5' 11" (1 803 m)   Wt 123 kg (271 lb 9 7 oz)   SpO2 93%   BMI 37 88 kg/m²     Temp (24hrs), Av 5 °F (36 4 °C), Min:97 2 °F (36 2 °C), Max:98 3 °F (36 8 °C)      Physical Exam:     General:  Alert, cooperative, and in no distress  Lower extremity exam:  Cardiovascular status at baseline  Neurological status at baseline  Musculoskeletal status at baseline  No calf tenderness noted bilaterally  Dressing left intact to the Operating Room       Additional Data:     Labs:    Results from last 7 days   Lab Units 22  0335   WBC Thousand/uL 4 32   HEMOGLOBIN g/dL 12  6   HEMATOCRIT % 37 3   PLATELETS Thousands/uL 191   NEUTROS PCT % 50   LYMPHS PCT % 31   MONOS PCT % 12   EOS PCT % 6     Results from last 7 days   Lab Units 04/28/22  0335   POTASSIUM mmol/L 4 6   CHLORIDE mmol/L 101   CO2 mmol/L 27   BUN mg/dL 38*   CREATININE mg/dL 1 52*   CALCIUM mg/dL 8 8           * I Have Reviewed All Lab Data Listed Above  Recent Cultures (last 7 days):     Results from last 7 days   Lab Units 04/25/22  1128 04/25/22  0939   BLOOD CULTURE  No Growth at 48 hrs  No Growth at 48 hrs  Imaging: I have personally reviewed pertinent films in PACS  EKG, Pathology, and Other Studies: I have personally reviewed pertinent reports  ** Please Note: Portions of the record may have been created with voice recognition software  Occasional wrong word or "sound a like" substitutions may have occurred due to the inherent limitations of voice recognition software  Read the chart carefully and recognize, using context, where substitutions have occurred   **

## 2022-04-28 NOTE — ASSESSMENT & PLAN NOTE
Lab Results   Component Value Date    HGBA1C 6 6 (H) 04/26/2022       Recent Labs     04/27/22  2113 04/28/22  0705 04/28/22  1216 04/28/22  1422   POCGLU 167* 114 120 114       Blood Sugar Average: Last 72 hrs:  (P) 004 [id number]   Updated A1c 6 6%  Pt declines insulin  Will not start insulin sliding scale  He wants to remain on jardiance  Risks were explained to him  Will continue to monitor and if creatinine increases will hold

## 2022-04-28 NOTE — PLAN OF CARE
Problem: Potential for Falls  Goal: Patient will remain free of falls  Description: INTERVENTIONS:  - Educate patient/family on patient safety including physical limitations  - Instruct patient to call for assistance with activity   - Consult OT/PT to assist with strengthening/mobility   - Keep Call bell within reach  - Keep bed low and locked with side rails adjusted as appropriate  - Keep care items and personal belongings within reach  - Initiate and maintain comfort rounds  - Make Fall Risk Sign visible to staff  - Offer Toileting every  Hours, in advance of need  - Initiate/Maintain alarm  - Obtain necessary fall risk management equipment:   - Apply yellow socks and bracelet for high fall risk patients  - Consider moving patient to room near nurses station  Outcome: Progressing     Problem: PAIN - ADULT  Goal: Verbalizes/displays adequate comfort level or baseline comfort level  Description: Interventions:  - Encourage patient to monitor pain and request assistance  - Assess pain using appropriate pain scale  - Administer analgesics based on type and severity of pain and evaluate response  - Implement non-pharmacological measures as appropriate and evaluate response  - Consider cultural and social influences on pain and pain management  - Notify physician/advanced practitioner if interventions unsuccessful or patient reports new pain  Outcome: Progressing     Problem: INFECTION - ADULT  Goal: Absence or prevention of progression during hospitalization  Description: INTERVENTIONS:  - Assess and monitor for signs and symptoms of infection  - Monitor lab/diagnostic results  - Monitor all insertion sites, i e  indwelling lines, tubes, and drains  - Monitor endotracheal if appropriate and nasal secretions for changes in amount and color  - [place] appropriate cooling/warming therapies per order  - Administer medications as ordered  - Instruct and encourage patient and family to use good hand hygiene technique  - Identify and instruct in appropriate isolation precautions for identified infection/condition  Outcome: Progressing  Goal: Absence of fever/infection during neutropenic period  Description: INTERVENTIONS:  - Monitor WBC    Outcome: Progressing     Problem: SAFETY ADULT  Goal: Patient will remain free of falls  Description: INTERVENTIONS:  - Educate patient/family on patient safety including physical limitations  - Instruct patient to call for assistance with activity   - Consult OT/PT to assist with strengthening/mobility   - Keep Call bell within reach  - Keep bed low and locked with side rails adjusted as appropriate  - Keep care items and personal belongings within reach  - Initiate and maintain comfort rounds  - Make Fall Risk Sign visible to staff  - Offer Toileting every  Hours, in advance of need  - Initiate/Maintain alarm  - Obtain necessary fall risk management equipment:   - Apply yellow socks and bracelet for high fall risk patients  - Consider moving patient to room near nurses station  Outcome: Progressing  Goal: Maintain or return to baseline ADL function  Description: INTERVENTIONS:  -  Assess patient's ability to carry out ADLs; assess patient's baseline for ADL function and identify physical deficits which impact ability to perform ADLs (bathing, care of mouth/teeth, toileting, grooming, dressing, etc )  - Assess/evaluate cause of self-care deficits   - Assess range of motion  - Assess patient's mobility; develop plan if impaired  - Assess patient's need for assistive devices and provide as appropriate  - Encourage maximum independence but intervene and supervise when necessary  - Involve family in performance of ADLs  - Assess for home care needs following discharge   - Consider OT consult to assist with ADL evaluation and planning for discharge  - Provide patient education as appropriate  Outcome: Progressing  Goal: Maintains/Returns to pre admission functional level  Description: INTERVENTIONS:  - Perform BMAT or MOVE assessment daily    - Set and communicate daily mobility goal to care team and patient/family/caregiver  - Collaborate with rehabilitation services on mobility goals if consulted  - Perform Range of Motion  times a day  - Reposition patient every  hours    - Dangle arnulfo times a day  - Stand patient  times a day  - Ambulate patient  times a day  - Out of bed to chair  times a day   - Out of bed for meals  times a day  - Out of bed for toileting  - Record patient progress and toleration of activity level   Outcome: Progressing

## 2022-04-28 NOTE — PROGRESS NOTES
[address] [place] [address]  Progress Note Adrienne Thompson 1965, 59 y o  male MRN: [id number]  Unit/Bed#: Daniel 68 2 [facility] 87 [id number] Encounter: [id number]  Primary Care Provider: Darrel Trinidad MD   Date and time admitted to hospital: 4/25/2022  9:16 AM    * Diabetic foot ulcer ([address] )  Assessment & Plan  Diabetic left foot ulcer with purulent drainage and surrounding cellulitis  MRI left foot: small plantar abscess base of 5th toe, findings of osteitis, no specific OM, no fracture   Appreciate podiatry recommendations  S/p left foot washout: discussed wound vac placement but at this time he hasn't required wound vac    weight bearing status: Partial weight bearing to LLE with darco wedge   Appreciate Infectious Disease consult recommendations, continue Unasyn and oral doxycycline  Blood cultures negative x 72 hours  Monitor fever curve, currently hemodynamically stable and nontoxic    H/O eye surgery  Assessment & Plan  Patient with recent surgery to his right eye  Ophthalmologist was contacted  Pt continued eye drops and eye has improved  Chronic kidney disease, stage 3 [facility])  Assessment & Plan  Lab Results   Component Value Date    EGFR 49 04/28/2022    EGFR 47 04/27/2022    EGFR 41 04/26/2022    CREATININE 1 52 (H) 04/28/2022    CREATININE 1 57 (H) 04/27/2022    CREATININE 1 76 (H) 04/26/2022   baseline creatinine is 1 4-1 6  Monitor renal function daily  Holding HCTZ lisinopril   Possibly restart in 24 to 48 hours    Class 2 severe obesity with serious comorbidity and body mass index (BMI) of 37 0 to 37 9 in adult [facility])  Assessment & Plan  Encourage diet and weight loss  BMI 37 8 noted    Diabetes mellitus [facility])  Assessment & Plan  Lab Results   Component Value Date    HGBA1C 6 6 (H) 04/26/2022       Recent Labs     04/27/22  2113 04/28/22  0705 04/28/22  1216 04/28/22  1422   POCGLU 167* 114 120 114       Blood Sugar Average: Last 72 hrs:  (P) 896 [id number]   Updated A1c 6 6%  Pt declines insulin  Will not start insulin sliding scale  He wants to remain on jardiance  Risks were explained to him  Will continue to monitor and if creatinine increases will hold  Essential hypertension  Assessment & Plan  Continue Coreg  Holding HCTZ and lisinopril with low-normal blood pressure  Monitor vital signs and avoid hypotension      VTE Pharmacologic Prophylaxis: pt declined heparin     Mechanical VTE Prophylaxis in Place: Yes    Education and Discussions with Family / Patient: called tevin and updated her    Time Spent for Care: 20 minutes  More than 50% of total time spent on counseling and coordination of care as described above  Current Length of Stay: 3 day(s)  Current Patient Status: Inpatient     Discharge Plan / Estimated Discharge Date: 48 to 72 hours    Code Status: Level 1 - Full Code      Subjective:   Pt seen and examined  Pt doing well after surgery  He is hungry  He states his eye is less painful  No f/c no cp no sob no n/v/d no abd pain  Objective:     Vitals:   Temp (24hrs), Av 4 °F (36 3 °C), Min:97 °F (36 1 °C), Max:98 3 °F (36 8 °C)    Temp:  [97 °F (36 1 °C)-98 3 °F (36 8 °C)] 97 3 °F (36 3 °C)  HR:  [73-84] 82  Resp:  [16-18] 18  BP: (126-148)/(71-95) 139/92  SpO2:  [90 %-95 %] 90 %  Body mass index is 37 88 kg/m²  Input and Output Summary (last 24 hours):     No intake or output data in the 24 hours ending 22 1526    Physical Exam:   Physical Exam  Constitutional:       Appearance: Normal appearance  HENT:      Head: Normocephalic and atraumatic  Eyes:      Extraocular Movements: Extraocular movements intact  Pupils: Pupils are equal, round, and reactive to light  Comments: Right eye with minimal erythema    Cardiovascular:      Rate and Rhythm: Normal rate and regular rhythm  Heart sounds: No murmur heard  No friction rub  No gallop  Pulmonary:      Effort: Pulmonary effort is normal  No respiratory distress        Breath sounds: Normal breath sounds  No wheezing or rales  Abdominal:      General: Bowel sounds are normal       Palpations: Abdomen is soft  Neurological:      Mental Status: He is alert and oriented to person, place, and time  Additional Data:     Labs:  Results from last 7 days   Lab Units 04/28/22  0335   WBC Thousand/uL 4 32   HEMOGLOBIN g/dL 12 6   HEMATOCRIT % 37 3   PLATELETS Thousands/uL 191   NEUTROS PCT % 50   LYMPHS PCT % 31   MONOS PCT % 12   EOS PCT % 6     Results from last 7 days   Lab Units 04/28/22  0335   SODIUM mmol/L 137   POTASSIUM mmol/L 4 6   CHLORIDE mmol/L 101   CO2 mmol/L 27   BUN mg/dL 38*   CREATININE mg/dL 1 52*   ANION GAP mmol/L 9   CALCIUM mg/dL 8 8   GLUCOSE RANDOM mg/dL 142*         Results from last 7 days   Lab Units 04/28/22  1422 04/28/22  1216 04/28/22  0705 04/27/22  2113 04/27/22  1627 04/27/22  1123 04/27/22  0726 04/26/22  2108 04/26/22  1637 04/26/22  1123 04/26/22  0718 04/25/22  2103   POC GLUCOSE mg/dl 114 120 114 167* 126 130 168* 147* 148* 164* 107 126     Results from last 7 days   Lab Units 04/26/22  0415   HEMOGLOBIN A1C % 6 6*           Recent Cultures (last 7 days):     Results from last 7 days   Lab Units 04/25/22  1128 04/25/22  0939   BLOOD CULTURE  No Growth at 72 hrs  No Growth at 72 hrs         Lines/Drains:  Invasive Devices  Report    Peripheral Intravenous Line            Peripheral IV 04/25/22 Left Antecubital 3 days              Last 24 Hours Medication List:   Current Facility-Administered Medications   Medication Dose Route Frequency Provider Last Rate    acetaminophen  650 mg Oral Q6H PRN [place], DPM      ampicillin-sulbactam  3 g Intravenous Q6H [place], DPM 3 g (04/28/22 1445)    carvedilol  25 mg Oral BID With Meals [place], DPM      doxycycline hyclate  100 mg Oral Q12H [facility] Damon Diana, DPM      gentamicin  1 drop Right Eye BID [place], DPM      glycerin-hypromellose-  1 drop Both Eyes Q6H PRN [place], DPM      HYDROmorphone 0 5 mg Intravenous Q4H PRN Joseph Pion, DPM      Loteprednol Etabonate   Right Eye BID Joseph Pion, DPM      melatonin  6 mg Oral HS Joseph Pion, DPM      ondansetron  4 mg Intravenous Q6H PRN Joseph Pion, DPM      pregabalin  75 mg Oral Daily Joseph Pion, DPM      sodium chloride  75 mL/hr Intravenous Continuous Joseph Pion, DPM 75 mL/hr (04/28/22 0549)    traMADol  50 mg Oral Q6H PRN Joseph Pion, DPM          Today, Patient Was Seen By: Les Martinez DO Nostril Rim Text: The closure involved the nostril rim.

## 2022-04-28 NOTE — ASSESSMENT & PLAN NOTE
Patient with recent surgery to his right eye  Ophthalmologist was contacted  Pt continued eye drops and eye has improved

## 2022-04-28 NOTE — PLAN OF CARE
Problem: PAIN - ADULT  Goal: Verbalizes/displays adequate comfort level or baseline comfort level  Description: Interventions:  - Encourage patient to monitor pain and request assistance  - Assess pain using appropriate pain scale  - Administer analgesics based on type and severity of pain and evaluate response  - Implement non-pharmacological measures as appropriate and evaluate response  - Consider cultural and social influences on pain and pain management  - Notify physician/advanced practitioner if interventions unsuccessful or patient reports new pain  Outcome: Progressing     Problem: INFECTION - ADULT  Goal: Absence or prevention of progression during hospitalization  Description: INTERVENTIONS:  - Assess and monitor for signs and symptoms of infection  - Monitor lab/diagnostic results  - Monitor all insertion sites, i e  indwelling lines, tubes, and drains  - Monitor endotracheal if appropriate and nasal secretions for changes in amount and color  - [place] appropriate cooling/warming therapies per order  - Administer medications as ordered  - Instruct and encourage patient and family to use good hand hygiene technique  - Identify and instruct in appropriate isolation precautions for identified infection/condition  Outcome: Progressing  Goal: Absence of fever/infection during neutropenic period  Description: INTERVENTIONS:  - Monitor WBC    Outcome: Progressing     Problem: SAFETY ADULT  Goal: Patient will remain free of falls  Description: INTERVENTIONS:  - Educate patient/family on patient safety including physical limitations  - Instruct patient to call for assistance with activity   - Consult OT/PT to assist with strengthening/mobility   - Keep Call bell within reach  - Keep bed low and locked with side rails adjusted as appropriate  - Keep care items and personal belongings within reach  - Initiate and maintain comfort rounds  - Make Fall Risk Sign visible to staff  - Offer Toileting every 2 Hours, in advance of need  - Initiate/Maintain   alarm  - Obtain necessary fall risk management equipment:   - Apply yellow socks and bracelet for high fall risk patients  - Consider moving patient to room near nurses station  Outcome: Progressing  Goal: Maintain or return to baseline ADL function  Description: INTERVENTIONS:  -  Assess patient's ability to carry out ADLs; assess patient's baseline for ADL function and identify physical deficits which impact ability to perform ADLs (bathing, care of mouth/teeth, toileting, grooming, dressing, etc )  - Assess/evaluate cause of self-care deficits   - Assess range of motion  - Assess patient's mobility; develop plan if impaired  - Assess patient's need for assistive devices and provide as appropriate  - Encourage maximum independence but intervene and supervise when necessary  - Involve family in performance of ADLs  - Assess for home care needs following discharge   - Consider OT consult to assist with ADL evaluation and planning for discharge  - Provide patient education as appropriate  Outcome: Progressing  Goal: Maintains/Returns to pre admission functional level  Description: INTERVENTIONS:  - Perform BMAT or MOVE assessment daily    - Set and communicate daily mobility goal to care team and patient/family/caregiver  - Collaborate with rehabilitation services on mobility goals if consulted  - Perform Range of Motion 3 times a day  - Reposition patient every 2 hours    - Dangle patient 3 times a day  - Stand patient 3 times a day  - Ambulate patient 3 times a day  - Out of bed to chair 3 times a day   - Out of bed for meals 3 times a day  - Out of bed for toileting  - Record patient progress and toleration of activity level   Outcome: Progressing

## 2022-04-28 NOTE — ANESTHESIA POSTPROCEDURE EVALUATION
Post-Op Assessment Note    CV Status:  Stable    Pain management: adequate     Mental Status:  Awake   Hydration Status:  Stable   PONV Controlled:  None   Airway Patency:  Patent      Post Op Vitals Reviewed: Yes      Staff: Anesthesiologist         No complications documented      BP      Temp     Pulse     Resp      SpO2      /88   Pulse 73   Temp (!) 97 3 °F (36 3 °C) (Oral)   Resp 18   Ht 5' 11" (1 803 m)   Wt 123 kg (271 lb 9 7 oz)   SpO2 92%   BMI 37 88 kg/m²

## 2022-04-28 NOTE — OP NOTE
OPERATIVE REPORT - Podiatry  PATIENT NAME: Kate Dickinson    :  1965  MRN: [id number]  Pt Location: [facility] OR ROOM 07    SURGERY DATE: 2022    Surgeon(s) and Role:     * Shani Martinez DPM - Primary     * Eder Yanez DPM - Assisting    Pre-op Diagnosis:  Diabetic ulcer of left foot ([facility] [address] ) [G39 735, L97 529]    Post-Op Diagnosis Codes:     * Diabetic ulcer of left foot ([facility] [address] ) [E11 621, L97 529]    Procedure(s) (LRB):  Left foot washout (Left)    Specimen(s):  ID Type Source Tests Collected by Time Destination   A : DEEP CULTURE, LEFT FOOT Tissue Foot, Left ANAEROBIC CULTURE AND GRAM STAIN Hannah Melgoza DPM 2022 1347    B : DEEP CULTURE, LEFT FOOT Tissue Foot, Left CULTURE, TISSUE AND GRAM STAIN Shani Martinez DPM 2022 1351        Estimated Blood Loss:   Minimal    Drains:  * No LDAs found *    Anesthesia Type:   Choice with 20 ml of 1% Lidocaine and 0 5% Bupivacaine in a 1:1 mixture prior to incision  Hemostasis:  Anatomic dissection, pressure    Materials:  None  Operative Findings:  No deep sinus tracts or purulence were expressed within the wound bed  Necrotic wound in tissue were excised in entirety  Remaining tissue appeared bleeding, viable  Complications:   None    Procedure and Technique:     Under mild sedation, the patient was brought into the operating room and placed on the operating room table in the supine position  IV sedation was achieved by anesthesia team and a universal timeout was performed where all parties are in agreement of correct patient, correct procedure and correct site  A local block was performed consisting of 20 ml of 1% Lidocaine and 0 5% Bupivacaine in a 1:1 mixture  The foot was then prepped and draped in the usual aseptic manner  Attention was directed to the lateral aspect of the left foot overlying the 5th MTPJ  There was noted to be a ulcer noted with necrotic eschar overlying it    Utilizing a 15 blade the necrotic wound was excised in entirety down to bone in an elliptical fashion  The wound bed was then inspected and probed utilizing hemostat and [place] elevator  It did appear to have a soft tissue space sub-MT5, but upon exploration there was noted to be no deeper sinus tracts or purulence in this area  Tissue and bone within the wound appeared healthy, bleeding, viable  The surgical incision was irrigated with copious amounts of normal sterile saline sing a pulse lavage  The foot was then cleansed and dried  The wound site was dressed with Xeroform, 4x4 gauze, and ABD  This was then covered with a Kerlix and an ACE wrap  The tourniquet was deflated and normal hyperemic flush was noted to all digits  The patient tolerated the procedure and anesthesia well and was transported to the PACU with vital signs stable  As with many limb salvage procedures, we contemplate the possibility of performing further stages to this procedure  Procedures may include debridements, delayed closure, plastic surgery techniques, or more proximal amputations  This procedure may be considered part of a multi-staged limb salvage treatment plan  Dr Sandy Juares was present during the entire procedure and participated in all key aspects  Plan for wound VAC to be applied postoperatively as there is no wound VAC available in the hospital at this time  SIGNATURE: Heide Quintanilla DPM  DATE: April 28, 2022  TIME: 1:57 PM      Portions of the record may have been created with voice recognition software  Occasional wrong word or "sound a like" substitutions may have occurred due to the inherent limitations of voice recognition software  Read the chart carefully and recognize, using context, where substitutions have occurred

## 2022-04-28 NOTE — ASSESSMENT & PLAN NOTE
Lab Results   Component Value Date    EGFR 49 04/28/2022    EGFR 47 04/27/2022    EGFR 41 04/26/2022    CREATININE 1 52 (H) 04/28/2022    CREATININE 1 57 (H) 04/27/2022    CREATININE 1 76 (H) 04/26/2022   baseline creatinine is 1 4-1 6  Monitor renal function daily  Holding HCTZ lisinopril   Possibly restart in 24 to 48 hours

## 2022-04-28 NOTE — OCCUPATIONAL THERAPY NOTE
Occupational Therapy Cancellation Note        Patient Name: Antonia Herr  FOFCS'B Date: 4/28/2022 04/28/22 1421   OT Last Visit   OT Visit Date 04/28/22   Note Type   Note type Cancelled Session; Evaluation   Cancel Reasons Patient to operating room   Additional Comments OT consult received and chart reviewed  Pt off the floor to OR for L foot washout  OT will follow up for evaluation at a later date when the patient is appropriate to participate          RIVAS Ariza/FERNANDO

## 2022-04-29 LAB
ANION GAP SERPL CALCULATED.3IONS-SCNC: 8 MMOL/L (ref 4–13)
BUN SERPL-MCNC: 37 MG/DL (ref 5–25)
CALCIUM SERPL-MCNC: 8.6 MG/DL (ref 8.3–10.1)
CHLORIDE SERPL-SCNC: 100 MMOL/L (ref 100–108)
CO2 SERPL-SCNC: 27 MMOL/L (ref 21–32)
CREAT SERPL-MCNC: 1.62 MG/DL (ref 0.6–1.3)
ERYTHROCYTE [DISTWIDTH] IN BLOOD BY AUTOMATED COUNT: 12.5 % (ref 11.6–15.1)
GFR SERPL CREATININE-BSD FRML MDRD: 45 ML/MIN/1.73SQ M
GLUCOSE SERPL-MCNC: 121 MG/DL (ref 65–140)
GLUCOSE SERPL-MCNC: 123 MG/DL (ref 65–140)
GLUCOSE SERPL-MCNC: 139 MG/DL (ref 65–140)
GLUCOSE SERPL-MCNC: 150 MG/DL (ref 65–140)
GLUCOSE SERPL-MCNC: 159 MG/DL (ref 65–140)
HCT VFR BLD AUTO: 34.9 % (ref 36.5–49.3)
HGB BLD-MCNC: 11.5 G/DL (ref 12–17)
MCH RBC QN AUTO: 30.8 PG (ref 26.8–34.3)
MCHC RBC AUTO-ENTMCNC: 33 G/DL (ref 31.4–37.4)
MCV RBC AUTO: 94 FL (ref 82–98)
PLATELET # BLD AUTO: 205 THOUSANDS/UL (ref 149–390)
PMV BLD AUTO: 10 FL (ref 8.9–12.7)
POTASSIUM SERPL-SCNC: 4.5 MMOL/L (ref 3.5–5.3)
RBC # BLD AUTO: 3.73 MILLION/UL (ref 3.88–5.62)
SODIUM SERPL-SCNC: 135 MMOL/L (ref 136–145)
WBC # BLD AUTO: 4.47 THOUSAND/UL (ref 4.31–10.16)

## 2022-04-29 PROCEDURE — 85027 COMPLETE CBC AUTOMATED: CPT | Performed by: INTERNAL MEDICINE

## 2022-04-29 PROCEDURE — 99231 SBSQ HOSP IP/OBS SF/LOW 25: CPT | Performed by: STUDENT IN AN ORGANIZED HEALTH CARE EDUCATION/TRAINING PROGRAM

## 2022-04-29 PROCEDURE — 80048 BASIC METABOLIC PNL TOTAL CA: CPT | Performed by: INTERNAL MEDICINE

## 2022-04-29 PROCEDURE — 2W1TX6Z COMPRESSION OF LEFT FOOT USING PRESSURE DRESSING: ICD-10-PCS | Performed by: PODIATRIST

## 2022-04-29 PROCEDURE — 97167 OT EVAL HIGH COMPLEX 60 MIN: CPT

## 2022-04-29 PROCEDURE — 82948 REAGENT STRIP/BLOOD GLUCOSE: CPT

## 2022-04-29 PROCEDURE — 97164 PT RE-EVAL EST PLAN CARE: CPT

## 2022-04-29 PROCEDURE — 99232 SBSQ HOSP IP/OBS MODERATE 35: CPT | Performed by: INTERNAL MEDICINE

## 2022-04-29 RX ADMIN — AMPICILLIN SODIUM AND SULBACTAM SODIUM 3 G: 10; 5 INJECTION, POWDER, FOR SOLUTION INTRAVENOUS at 14:01

## 2022-04-29 RX ADMIN — DOXYCYCLINE 100 MG: 100 CAPSULE ORAL at 21:50

## 2022-04-29 RX ADMIN — AMPICILLIN SODIUM AND SULBACTAM SODIUM 3 G: 10; 5 INJECTION, POWDER, FOR SOLUTION INTRAVENOUS at 21:50

## 2022-04-29 RX ADMIN — LOTEPREDNOL ETABONATE: 10 SUSPENSION TOPICAL at 08:20

## 2022-04-29 RX ADMIN — ACETAMINOPHEN 325MG 650 MG: 325 TABLET ORAL at 08:24

## 2022-04-29 RX ADMIN — CARVEDILOL 25 MG: 25 TABLET, FILM COATED ORAL at 21:50

## 2022-04-29 RX ADMIN — HYDROMORPHONE HYDROCHLORIDE 0.5 MG: 1 INJECTION, SOLUTION INTRAMUSCULAR; INTRAVENOUS; SUBCUTANEOUS at 19:55

## 2022-04-29 RX ADMIN — AMPICILLIN SODIUM AND SULBACTAM SODIUM 3 G: 10; 5 INJECTION, POWDER, FOR SOLUTION INTRAVENOUS at 08:20

## 2022-04-29 RX ADMIN — MELATONIN 6 MG: at 21:51

## 2022-04-29 RX ADMIN — HYDROMORPHONE HYDROCHLORIDE 0.5 MG: 1 INJECTION, SOLUTION INTRAMUSCULAR; INTRAVENOUS; SUBCUTANEOUS at 01:04

## 2022-04-29 RX ADMIN — GLYCERIN, HYPROMELLOSE, POLYETHYLENE GLYCOL 1 DROP: .2; .2; 1 LIQUID OPHTHALMIC at 21:51

## 2022-04-29 RX ADMIN — AMPICILLIN SODIUM AND SULBACTAM SODIUM 3 G: 10; 5 INJECTION, POWDER, FOR SOLUTION INTRAVENOUS at 03:25

## 2022-04-29 RX ADMIN — PREGABALIN 75 MG: 75 CAPSULE ORAL at 21:50

## 2022-04-29 RX ADMIN — GENTAMICIN SULFATE 1 DROP: 3 SOLUTION/ DROPS OPHTHALMIC at 08:19

## 2022-04-29 RX ADMIN — DOXYCYCLINE 100 MG: 100 CAPSULE ORAL at 08:21

## 2022-04-29 RX ADMIN — CARVEDILOL 25 MG: 25 TABLET, FILM COATED ORAL at 08:21

## 2022-04-29 RX ADMIN — GENTAMICIN SULFATE 1 DROP: 3 SOLUTION/ DROPS OPHTHALMIC at 21:51

## 2022-04-29 RX ADMIN — HYDROMORPHONE HYDROCHLORIDE 0.5 MG: 1 INJECTION, SOLUTION INTRAMUSCULAR; INTRAVENOUS; SUBCUTANEOUS at 09:40

## 2022-04-29 RX ADMIN — LOTEPREDNOL ETABONATE: 10 SUSPENSION TOPICAL at 21:51

## 2022-04-29 NOTE — OCCUPATIONAL THERAPY NOTE
Occupational Therapy Evaluation     Patient Name: Corrie Jeong  LXNRE'F Date: 4/29/2022     Problem List  Principal Problem:    Diabetic foot ulcer ([facility] [address] )  Active Problems:    Essential hypertension    Diabetes mellitus ([facility] [address] )    Class 2 severe obesity with serious comorbidity and body mass index (BMI) of 37 0 to 37 9 in adult [facility])    Chronic kidney disease, stage 3 (HCC)    H/O eye surgery    Past Medical History  Past Medical History:   Diagnosis Date    H/O eye surgery     High blood sugar     Hypertension      Past Surgical History  Past Surgical History:   Procedure Laterality Date    HERNIA REPAIR      KIDNEY SURGERY      MOUTH SURGERY      WOUND DEBRIDEMENT Left 4/28/2022    Procedure: Left foot washout;  Surgeon: Mindy Moya DPM;  Location: [facility];  Service: Podiatry         04/29/22 1138   OT Last Visit   OT Visit Date 04/29/22   Note Type   Note type Evaluation   Restrictions/Precautions   Weight Bearing Precautions Per Order Yes   LLE Weight Bearing Per Order NWB   Other Precautions Impulsive;WBS;Fall Risk;Pain;Multiple lines  (wound vac)   Pain Assessment   Pain Assessment Tool FLACC   Pain Rating: FLACC (Rest) - Face 0   Pain Rating: FLACC (Rest) - Legs 0   Pain Rating: FLACC (Rest) - Activity 0   Pain Rating: FLACC (Rest) - Cry 1   Pain Rating: FLACC (Rest) - Consolability 0   Score: FLACC (Rest) 1   Pain Rating: FLACC (Activity) - Face 0   Pain Rating: FLACC (Activity) - Legs 0   Pain Rating: FLACC (Activity) - Activity 0   Pain Rating: FLACC (Activity) - Cry 0   Pain Rating: FLACC (Activity) - Consolability 0   Score: FLACC (Activity) 0   Home Living   Type of Home House   Home Layout Multi-level;Bed/bath upstairs;Stairs to enter without rails   Bathroom Shower/Tub Tub/shower unit   Bathroom Toilet Standard   Bathroom Equipment Grab bars in shower   Bathroom Accessibility Accessible   Home Equipment Cane   Additional Comments 7 MIS, FOS to upper level with bed/bath   Has multiple pets to care for, so states 1 level living not possible    Prior Function   Level of [place] Independent with ADLs and functional mobility   Lives With Spouse   Receives Help From Family   ADL Assistance Independent   IADLs Independent   Falls in the last 6 months 1 to 4  (1 slip on ice)   Vocational On disability   Comments (+) , spouse works  Indep at baseline without AD   Psychosocial   Psychosocial (WDL) WDL   Subjective   Subjective Pt agreeable to therapy evaluation    ADL   Eating Assistance 7  Independent   Grooming Assistance 6  Modified Independent   Grooming Deficit   (while seated)   UB Dressing Assistance 6  Modified independent   UB Dressing Deficit Setup  (while seated)   LB Dressing Assistance 5  Supervision/Setup   LB Dressing Deficit Setup;Supervision/safety; Increased time to complete  (pt demonstrated ability to reach down to B feet)   Bed Mobility   Supine to Sit 6  Modified independent   Additional items HOB elevated; Bedrails   Sit to Supine 6  Modified independent   Additional items HOB elevated; Bedrails   Transfers   Sit to Stand 5  Supervision   Additional items Increased time required;Verbal cues; Impulsive  (with AD)   Stand to Sit 5  Supervision   Additional items Increased time required;Verbal cues  (with AD)   Additional Comments close SBA for line management of wound vac and safety    Functional Mobility   Functional Mobility 5  Supervision   Additional Comments with RW   Activity Tolerance   Activity Tolerance Patient tolerated treatment well   Medical Staff [address], PT  Pt seen for co-evaluation with Physical Therapist due to pt's medical complexity, functional limitations and limited activity tolerance      Nurse Made Aware Cass Traylor RN   RUE Assessment   RUE Assessment WFL   LUE Assessment   LUE Assessment WFL   Hand Function   Gross Motor Coordination Functional   Fine Motor Coordination Functional   Sensation   Light Touch No apparent deficits  (in BUEs)   Vision-Basic Assessment   Current Vision No visual deficits   Visual History Surgical intervention   Vision - Complex Assessment   Acuity Able to read clock/calendar on wall without difficulty   Cognition   Overall Cognitive Status [facility]   Arousal/Participation Alert; Cooperative   Attention Within functional limits   Orientation Level Oriented X4   Memory Within functional limits   Following Commands Follows all commands and directions without difficulty   Comments mildly decreased insight and somewhat impulsive  Overall cooperative and pleasant  Assessment   Limitation Decreased ADL status; Decreased endurance;Decreased high-level ADLs   Assessment Pt is a 59 y o  male seen for OT evaluation s/p admit to [facility] on 4/25/2022 w/ Diabetic foot ulcer ([facility] [address] )  Comorbidities affecting pt's functional performance at time of assessment include: DM, HTN, obesity, previous surgery and neuropathy  Personal factors affecting pt at time of IE include:steps to enter environment, difficulty performing IADLS , limited insight into deficits, compliance and health management   Prior to admission, pt was independent for ADLs and functional mobility without AD  Upon evaluation: Pt requires Minimal Assistance with crutches vs RW for mobility, Standby Assistance for ADLs 2* the following deficits impacting occupational performance: decreased balance, decreased tolerance, increased pain and WBS  Pt to benefit from continued skilled OT tx while in the hospital to address deficits as defined above and maximize level of functional independence w ADL's and functional mobility  Occupational Performance areas to address include: grooming, bathing/shower, toilet hygiene, dressing and functional mobility  From OT standpoint, recommendation at time of d/c would be no further OT needs  Goals   Patient Goals to go home soon    Plan   Treatment Interventions ADL retraining;Functional transfer training; Endurance training;Patient/family training;Equipment evaluation/education   Goal Expiration Date 05/13/22   OT Treatment Day 0   OT Frequency 2-3x/wk   Recommendation   OT Discharge Recommendation No rehabilitation needs   OT - OK to Discharge   (once medically cleared)   Additional Comments  The patient's raw score on the AM-PAC Daily Activity inpatient short form is 22, standardized score is 47 1, greater than 39 4  Patients at this level are likely to benefit from discharge to home  Please refer to the recommendation of the Occupational Therapist for safe discharge planning  AM-PAC Daily Activity Inpatient   Lower Body Dressing 3   Bathing 3   Toileting 4   Upper Body Dressing 4   Grooming 4   Eating 4   Daily Activity Raw Score 22   Daily Activity Standardized Score (Calc for Raw Score >=11) 47  [address] [place]   Following a Speech/Presentation 4   Understanding Ordinary Conversation 4   Taking Medications 4   Remembering Where Things Are Placed or Put Away 4   Remembering List of 4-5 Errands 4   Taking Care of Complicated Tasks 4   Applied Cognition Raw Score 24   Applied Cognition Standardized Score 62 21       Goals: to be met by 5/13/22    Patient will perform functional bed mobility with independence, with HOB flat, no rails  Patient will perform functional transfers with independence in preparation for ADL tasks, with good safety awareness  Patient will perform UB dressing task with independence  Patient will perform LB dressing task with independence  Patient will perform toilet transfer with [address]  Patient will perform toileting with Standby Assistance, including hygiene and clothing management   Patient will improve activity tolerance by participating in 20 minutes of session at a time in preparation for participation in ADL tasks  Patient will identify 3 potential fall hazards and identify compensatory techniques to decrease fall risk in the home environment  Patient will attend to 100% of cognitive task during session     Bam Chang, OTR/L

## 2022-04-29 NOTE — ASSESSMENT & PLAN NOTE
Diabetic left foot ulcer with purulent drainage and surrounding cellulitis  MRI left foot: small plantar abscess base of 5th toe, findings of osteitis, no specific OM, no fracture   Appreciate podiatry recommendations  S/p left foot washout: wound vac placed this am    weight bearing status: nonweight baring to LLE with darco wedge   Appreciate Infectious Disease consult recommendations, continue Unasyn and oral doxycycline  Blood cultures negative x 72 hours     Monitor fever curve, currently hemodynamically stable and nontoxic

## 2022-04-29 NOTE — CASE MANAGEMENT
Case Management Progress Note    Patient name Juan David Rosas  Location [facility] 2 Neelima Vasquez* MRN [id number]  : 1965 Date 2022       LOS (days): 4  Geometric Mean LOS (GMLOS) (days): 5 30  Days to GMLOS:1        OBJECTIVE:        Current admission status: Inpatient  Preferred Pharmacy:   [address] [place] [address]  Phone: [phone] Fax: [phone]    Primary Care Provider: Leonard Boles MD    Primary Insurance: MEDICARE  Secondary Insurance: BLUE CROSS    PROGRESS NOTE: [employer] Wound Vac authorization initiated with [employer]# [id number]  Anticipate d/c Monday with [facility] (awaiting accepting agency)- will have RW delivered at DC     Will continue to follow to assist with same

## 2022-04-29 NOTE — CASE MANAGEMENT
Case Management Assessment & Discharge Planning Note    Patient name Natan Hastings  Location [place] [facility] /[facility] 2 Kim Brunner* MRN [id number]  : 1965 Date 2022       Current Admission Date: 2022  Current Admission Diagnosis:Diabetic foot ulcer ([facility] [address] )   Patient Active Problem List    Diagnosis Date Noted    H/O eye surgery 2022    Diabetic foot ulcer ([facility] [address] ) 2022    Dog bite of arm, right, sequela 2022    Cellulitis 2022    Chronic kidney disease, stage 3 ([facility] [address] ) 2022    Erectile dysfunction 2021    Neuropathy 2021    Low back pain with sciatica 2021    Neck pain 2021    Lumbar radiculopathy 2021    Cervical radiculopathy 2021    Myofascial pain 2021    Class 2 severe obesity with serious comorbidity and body mass index (BMI) of 37 0 to 37 9 in [facility]) 2021    Stage 2 chronic kidney disease 2021    Essential hypertension     [facility]       LOS (days): 4  Geometric Mean LOS (GMLOS) (days): 5 30  Days to GMLOS:1 1     OBJECTIVE:    Risk of Unplanned Readmission Score: 23         Current admission status: Inpatient       Preferred Pharmacy:   [address] [place] [address]  Phone: [phone] Fax: [phone]    Primary Care Provider: Ritu Sow MD    Primary Insurance: MEDICARE  Secondary Insurance: BLUE CROSS    ASSESSMENT:  [facility] Tony Goode 3679 Representative - Significant Other   Primary Phone: [phone] (Mobile)               Advance Directives  Does patient have a [address] [facility] [address]?: No  Was patient offered paperwork?: Yes (declined)  Does patient currently have a Health Care decision maker?: Yes, please see Health Care Proxy section  Does patient have Advance Directives?: No (declined)  Was patient offered paperwork?: Yes (declined)  Primary Contact: Zackery Mccarthy Cause  30 Day Readmission: No    Patient Information  Admitted from[de-identified] Home  Mental Status: Alert  During Assessment patient was accompanied by: Not accompanied during assessment  Assessment information provided by[de-identified] Patient  Primary Caregiver: [address] [facility] of Residence: [address] [place] [address] do you live in?: [facility] entry access options   Select all that apply : Stairs  Number of steps to enter home : 8  Do the steps have railings?: Yes  Type of Current Residence: 2 story home  Upon entering residence, is there a bedroom on the main floor (no further steps)?: No  A bedroom is located on the following floor levels of residence (select all that apply):: 2nd Floor  Upon entering residence, is there a bathroom on the main floor (no further steps)?: No  Indicate which floors of current residence have a bathroom (select all the apply):: 2nd Floor  Number of steps to 2nd floor from main floor: One Flight  In the last 12 months, was there a time when you were not able to pay the mortgage or rent on time?: No  In the last 12 months, how many places have you lived?: 1  In the last 12 months, was there a time when you did not have a steady place to sleep or slept in a shelter (including now)?: No  Living Arrangements: Lives w/ Spouse/significant other  Is patient a ?: No    Activities of Daily Living Prior to Admission  Functional Status: Independent  Completes ADLs independently?: Yes  Ambulates independently?: Yes  Does patient use assisted devices?: No  Does patient currently own DME?: No  Does patient have a history of Outpatient Therapy (PT/OT)?: Yes  Does the patient have a history of Short-Term Rehab?: No  Does patient have a history of HHC?: Yes  Does patient currently have [address]?: No         Patient Information Continued  Income Source: Food stamps  Does patient have prescription coverage?: Yes  Within the past 12 months, you worried that your food would run out before you got the money to buy more : Never true  Within the past 12 months, the food you bought just didnt last and you didnt have money to get more : Never true  Does patient have a history of substance abuse?: No  Does patient have a history of Mental Health Diagnosis?: No         Means of Transportation  Means of Transport to Appts[de-identified] Drives Self  In the past 12 months, has lack of transportation kept you from medical appointments or from getting medications?: No  In the past 12 months, has lack of transportation kept you from meetings, work, or from getting things needed for daily living?: No        DISCHARGE DETAILS:    Discharge planning discussed with[de-identified] pt  Freedom of Choice: Yes  Comments - Freedom of Choice: pt agreeable to [facility](SN/PT) would like [facility] if able  CM contacted family/caregiver?: No- see comments             Contacts  Patient Contacts: Tuyet  Relationship to Patient[de-identified] [address]         Is the patient interested in [facility] at discharge?: Yes  [address] requested[de-identified] [place] Name[de-identified] Other  [facility] Provider[de-identified] Other (comment) (Dr Carline Henderson)  [address] Needed[de-identified] Evaluate Functional Status and Safety,Gait/ADL Training,Strengthening/Theraputic Exercises to Improve Function,Wound/Ostomy Care  Homebound Criteria Met[de-identified] Uses an Assist Device (i e  cane, walker, etc),Requires the Assistance of Another Person for Safe Ambulation or to Leave the [facility] [place] [facility] Findings[de-identified] Limited Endurance    DME Referral Provided  Referral made for DME?: Yes  DME referral completed for the following items[de-identified] Mike Warner  DME Supplier Name[de-identified] [employer]              Treatment Team Recommendation: Home with [address]  Discharge Destination Plan[de-identified] Home with Marisela at Discharge : Auto with designated         Transported by [employer] and Unit #): family                    IMM Given (Date):: 04/29/22  IMM Given to[de-identified] Patient

## 2022-04-29 NOTE — PROGRESS NOTES
Podiatry - Progress Note  Patient: Julieann Frankel 59 y o  male   MRN: [id number]  PCP: Darby Johansen MD  Unit/Bed#: [place] [facility] 87 224-01 Encounter: [id number]  Date Of Visit: 22    ASSESSMENT:    Julieann Frankel is a 59 y o  male with:    1  Left plantar foot ulceration        -s/p left foot washout/wound debridement DOS: 22  2  Left 3rd interdigital space abrasion- healed  3  L foot cellulitis  4  T2DM  5  CKD 2  6  Lumbar radiculopathy    PLAN:    · Post-surgical dressing located on surgical site was changed  Wound vac was applied  Pt will need wound vac out pt  case management on board  · Pain is well controlled  Continue current pain management regimen  · Elevation on green foam wedges or pillows when non-ambulatory  · Rest of care per primary team     Weightbearing status: NWB to LLE    Wound VAC application  1  Number of sponges: 1  2  Pressure settin continuous  3  Size of wound: 3 5x3 5x0 6 cm      SUBJECTIVE:     The patient was seen, evaluated, and assessed at bedside today  The patient was awake, alert, and in no acute distress  The patient reports mild pain during dressing change at this time  Pain is well controlled with current pain management regimen  Patient reports normal appetite and using the restroom postoperatively  Patient denies N/V/F/chills/SOB/CP  OBJECTIVE:     Vitals:   /84   Pulse 81   Temp 98 °F (36 7 °C)   Resp 18   Ht 5' 11" (1 803 m)   Wt 123 kg (271 lb 9 7 oz)   SpO2 94%   BMI 37 88 kg/m²     Temp (24hrs), Av 7 °F (36 5 °C), Min:97 °F (36 1 °C), Max:98 1 °F (36 7 °C)      Physical Exam:     General:  Alert, cooperative, and in no distress  Lower extremity exam:  Cardiovascular status at baseline  Neurological status at baseline  Musculoskeletal status at baseline  No erythema, edema, or lymphangitis noted      Wound #: 1  Location: left lateral foot  Length 3 5cm: Width 3 5cm: Depth 0 6cm:   Deepest Tissue Noted in Base: periosteum  Probe to Bone: periosteum  Peripheral Skin Description: Attached  Granulation: 95% Fibrotic Tissue: 5% Necrotic Tissue: 0%   Drainage Amount: minimal, serosanguinous  Signs of Infection: No ascending erythema, no crepitus, no purulence noted at this time  Additional Data:     Labs:    Results from last 7 days   Lab Units 04/29/22  0535 04/28/22  0335 04/28/22  0335   WBC Thousand/uL 4 47   < > 4 32   HEMOGLOBIN g/dL 11 5*   < > 12 6   HEMATOCRIT % 34 9*   < > 37 3   PLATELETS Thousands/uL 205   < > 191   NEUTROS PCT %  --   --  50   LYMPHS PCT %  --   --  31   MONOS PCT %  --   --  12   EOS PCT %  --   --  6    < > = values in this interval not displayed  Results from last 7 days   Lab Units 04/29/22  0535   POTASSIUM mmol/L 4 5   CHLORIDE mmol/L 100   CO2 mmol/L 27   BUN mg/dL 37*   CREATININE mg/dL 1 62*   CALCIUM mg/dL 8 6           * I Have Reviewed All Lab Data Listed Above  Recent Cultures (last 7 days):     Results from last 7 days   Lab Units 04/28/22  1351 04/25/22  1128 04/25/22  0939   BLOOD CULTURE   --  No Growth at 72 hrs  No Growth at 72 hrs  GRAM STAIN RESULT  No Polys or Bacteria seen  --   --      Results from last 7 days   Lab Units 04/28/22  1347   ANAEROBIC CULTURE  Culture results to follow  Imaging: I have personally reviewed pertinent films in PACS  EKG, Pathology, and Other Studies: I have personally reviewed pertinent reports  ** Please Note: Portions of the record may have been created with voice recognition software  Occasional wrong word or "sound a like" substitutions may have occurred due to the inherent limitations of voice recognition software  Read the chart carefully and recognize, using context, where substitutions have occurred   **

## 2022-04-29 NOTE — PHYSICAL THERAPY NOTE
PHYSICAL THERAPY RE-EVALUATION NOTE          Patient Name: Claus Valadez  ONHKW'Y Date: 4/29/2022 04/29/22 1134   PT Last Visit   PT Visit Date 04/29/22   Note Type   Note type Re-Evaluation   Additional Comments s/p L foot washout 4/28 and vac placement 4/29  now nwb LLE per podiatry   Pain Assessment   Pain Assessment Tool FLACC   Pain Location/Orientation Orientation: Left; Location: Foot   Hospital Pain Intervention(s) Repositioned; Ambulation/increased activity; Emotional support;Elevated; Rest   Pain Rating: FLACC (Rest) - Face 0   Pain Rating: FLACC (Rest) - Legs 0   Pain Rating: FLACC (Rest) - Activity 0   Pain Rating: FLACC (Rest) - Cry 1   Pain Rating: FLACC (Rest) - Consolability 0   Score: FLACC (Rest) 1   Pain Rating: FLACC (Activity) - Face 0   Pain Rating: FLACC (Activity) - Legs 0   Pain Rating: FLACC (Activity) - Activity 0   Pain Rating: FLACC (Activity) - Cry 0   Pain Rating: FLACC (Activity) - Consolability 0   Score: FLACC (Activity) 0   Restrictions/Precautions   Weight Bearing Precautions Per Order Yes   LLE Weight Bearing Per Order NWB   Other Precautions Impulsive;WBS;Multiple lines; Fall Risk;Pain   Home Living   Additional Comments see IE 4/27   Prior Function   Comments see IE 4/27   General   Additional Pertinent History POD#1 L foot washout  wound vac placed bedside today   Cognition   Overall Cognitive Status WFL   Arousal/Participation Cooperative   Orientation Level Oriented X4   Memory Within functional limits   Following Commands Follows one step commands without difficulty   Comments decreased insight  Bed Mobility   Supine to Sit 6  Modified independent   Additional items HOB elevated   Sit to Supine 6  Modified independent   Additional items HOB elevated; Increased time required   Transfers   Sit to Stand 5  Supervision   Additional items Increased time required; Other  (AD)   Stand to Sit 5  Supervision   Additional items Increased time required;Verbal cues; Other  (AD) Additional Comments close S and assistance for line management  Ambulation/Elevation   Gait pattern Narrow GLORIA; Inconsistent freda  (unsteady)   Gait Assistance 4  Minimal assist   Additional items Assist x 1;Verbal cues  (for pacing, sequencing w AD)   Assistive Device Rolling walker;Crutches   Distance 8' w RW; 12' w crutches   Stair Management Assistance Not tested  (pt decline to trial this session)   Balance   Static Standing Fair -   Dynamic Standing Poor +   Ambulatory Poor +  (fair - w RW; poor + w crutches)   Activity Tolerance   Activity Tolerance Treatment limited secondary to medical complications (Comment)  (wb status, fall risk)   Medical Staff Made Aware Umesh  OT   Nurse Made Aware Raffi Zaragoza RN   Assessment   Prognosis Fair   Problem List Impaired balance;Decreased mobility; Impaired judgement;Decreased safety awareness; Impaired sensation;Obesity; Decreased skin integrity;Pain  (wb restrictions)   Assessment Ed was seen for a re-evaluation now POD#1 L foot washout and same day wound vac placement  Requires increased supervision for transfers given change in wbs and lines  No difficulty maintaining NWB LLE during transfers w AD  Also able to maintain NWB during hopping w RW  Pt requesting to try crutches; given fair - balance w RW and ability to maintain NWB we did trial crutches this session per pt request  However pt with worse ambulatory balance w use of crutches  Very unsteady and difficulty coordinating crutches for steady gait  Require hands on assistance for stability/ safety  Require assist to manage wound vac throughout session as well as occasional cues for line awareness  Would recommend continued gait training w RW as at present this is the safest method of mobility to reduce fall risk and maintain NWB LLE  Will practice crutches per patient request to improve safety and gait sequencing but encourage RW as primary DME use   Would also recommend stair trial to dc to ensure safety and ability to maintain NWB  Barriers to Discharge Inaccessible home environment   Barriers to Discharge Comments steps   Goals   Patient Goals go home soon   STG Expiration Date 05/09/22   Short Term Goal #1 1)  Pt will perform bed mobility with indep demonstrating appropriate technique 100% of the time in order to improve function  2)  Perform all transfers with Alejandra demonstrating safe and appropriate technique 100% of the time in order to improve ability to negotiate safely in home environment  3) Amb with least restrictive AD > 50'x1 with mod I in order to demonstrate ability to negotiate in home environment  4)  Improve overall strength and balance 1/2 grade in order to optimize ability to perform functional tasks and reduce fall risk  5) Increase activity tolerance to 45 minutes in order to improve endurance to functional tasks  6)  Negotiate stairs using most appropriate technique and S in order to be able to negotiate safely in home environment  7) PT for ongoing patient and family/caregiver education, DME needs and d/c planning in order to promote highest level of function in least restrictive environment  8) Maintain WBS of % during functional mobility  Plan   Treatment/Interventions Functional transfer training;LE strengthening/ROM; Elevations; Therapeutic exercise;Patient/family training;Equipment eval/education; Bed mobility;Gait training; Compensatory technique education;Continued evaluation;Spoke to nursing;OT   PT Frequency 3-5x/wk   Recommendation   PT Discharge Recommendation Home with home health rehabilitation  (v STR pending progress)   Equipment Recommended [facility] Recommended Wheeled walker   Change/add to [employer]? No   Additional Comments The patient's AM-PAC Basic Mobility Inpatient Short Form Raw Score is 20  A Raw score of greater than 16 suggests the patient may benefit from discharge to home   Please also refer to the recommendation of the Physical Therapist for safe discharge planning     AM-PAC Basic Mobility Inpatient   Turning in Bed Without Bedrails 4   Lying on Back to Sitting on Edge of Flat Bed 4   Moving Bed to Chair 3   Standing Up From Chair 3   Walk in Room 3   Climb 3-5 Stairs 3   Basic Mobility Inpatient Raw Score 20   Basic Mobility Standardized Score 43 99   Highest Level Of Mobility   JH-HLM Goal 6: Walk 10 steps or more   JH-HLM Highest Level of Mobility 6: Walk 10 steps or more   JH-HLM Goal Achieved Yes     Maryana Maciel, PT

## 2022-04-29 NOTE — PLAN OF CARE
Problem: OCCUPATIONAL THERAPY ADULT  Goal: Performs self-care activities at highest level of function for planned discharge setting  See evaluation for individualized goals  Description: Treatment Interventions: ADL retraining,Functional transfer training,Endurance training,Patient/family training,Equipment evaluation/education          See flowsheet documentation for full assessment, interventions and recommendations  Outcome: Progressing  Note: Limitation: Decreased ADL status,Decreased endurance,Decreased high-level ADLs     Assessment: Pt is a 59 y o  male seen for OT evaluation s/p admit to [facility]/25[facility]2022 w/ Diabetic foot ulcer ([facility] [address] )  Comorbidities affecting pt's functional performance at time of assessment include: DM, HTN, obesity, previous surgery and neuropathy  Personal factors affecting pt at time of IE include:steps to enter environment, difficulty performing IADLS , limited insight into deficits, compliance and health management   Prior to admission, pt was independent for ADLs and functional mobility without AD  Upon evaluation: Pt requires Minimal Assistance with crutches vs RW for mobility, Standby Assistance for ADLs 2* the following deficits impacting occupational performance: decreased balance, decreased tolerance, increased pain and WBS  Pt to benefit from continued skilled OT tx while in the hospital to address deficits as defined above and maximize level of functional independence w ADL's and functional mobility  Occupational Performance areas to address include: grooming, bathing/shower, toilet hygiene, dressing and functional mobility  From OT standpoint, recommendation at time of d/c would be no further OT needs         OT Discharge Recommendation: No rehabilitation needs  OT - OK to Discharge:  (once medically cleared)     Sachi Torres OTR/L

## 2022-04-29 NOTE — PROGRESS NOTES
Progress Note - Infectious Disease   Colten Preston 59 y o  male MRN: [id number]  Unit/Bed#: [facility] 87 224-01 Encounter: [id number]      Impression/Plan:    1  Infected left diabetic foot ulcer-persistent despite outpatient oral antibiotics  Outpatient wound culture grew an E coli and mixed skin mendez  Patient hemodynamically stable  Blood cultures no growth  His antibiotics are limited because he has had a severe rash with cephalosporins, however he tolerates penicillins without difficulty  MRI of the foot shows a fluid collection at the plantar 5th MTPJ without definitive signs of osteomyelitis  Status post OR debridement 22 of necrotic wound  No purulence or sinus tracts seen, underlying tissue and bone appeared healthy  Cultures pending and wound vac now placed   -continue Unasyn and doxycycline for now  -follow up OR cultures  -recheck CBC with diff and BMP  -local wound care  -close podiatry follow-up  -anticipate transition to PO antibiotics in next 48 hours for discharge, once cultures result     2  Diabetes mellitus-type 2  Without long-term insulin use    3  Chronic kidney disease stage 3  Creatinine elevated on admission, now improving   -recheck BMP  -dose adjusted antibiotics as needed  -volume management     4  Obesity-with a body mass index of greater than 37  Risk factor for complicating infection and relapse   -stress weight loss through diet and exercise    Discussed the above management plan with the primary service  ID will follow  Antibiotics:  Unasyn day 5  Doxycycline day 5    Subjective:  Patient having pain in the left foot today  Wound vac was placed this morning by Podiatry  He denies fever, chills, abdominal pain, chest pain, nausea, diarrhea      Objective:  Vitals:  Temp:  [97 °F (36 1 °C)-98 1 °F (36 7 °C)] 98 °F (36 7 °C)  HR:  [73-89] 81  Resp:  [16-18] 18  BP: (126-153)/(71-95) 137/84  SpO2:  [90 %-95 %] 94 %  Temp (24hrs), Av 7 °F (36 5 °C), Min:97 °F (36 1 °C), Max:98 1 °F (36 7 °C)  Current: Temperature: 98 °F (36 7 °C)    Physical Exam:   General Appearance:  Alert, interactive, nontoxic, no acute distress  Throat: Oropharynx moist without lesions  Lungs:   Clear to auscultation bilaterally; no wheezes, rhonchi or rales; respirations unlabored   Heart:  RRR; no murmur, rub or gallop   Abdomen:   Soft, non-tender, non-distended, positive bowel sounds  Extremities: No clubbing, cyanosis  Left foot dressed with a dry dressing in place  Reviewed new images in epic with notable erythema on the dorsum of the foot and necrotic wound  Skin: No new rashes or lesions  No draining wounds noted  Labs, Imaging, & Other studies:   All pertinent labs and imaging studies were personally reviewed  Results from last 7 days   Lab Units 04/29/22  0535 04/28/22  0335 04/27/22  0408   WBC Thousand/uL 4 47 4 32 3 87*   HEMOGLOBIN g/dL 11 5* 12 6 11 5*   PLATELETS Thousands/uL 205 191 187     Results from last 7 days   Lab Units 04/29/22  0535 04/28/22  0335 04/28/22  0335 04/27/22  0408 04/27/22  0408   SODIUM mmol/L 135*  --  137  --  137   POTASSIUM mmol/L 4 5   < > 4 6   < > 4 5   CHLORIDE mmol/L 100   < > 101   < > 103   CO2 mmol/L 27   < > 27   < > 26   BUN mg/dL 37*   < > 38*   < > 35*   CREATININE mg/dL 1 62*   < > 1 52*   < > 1 57*   EGFR ml/min/1 73sq m 45   < > 49   < > 47   CALCIUM mg/dL 8 6   < > 8 8   < > 8 7    < > = values in this interval not displayed  Results from last 7 days   Lab Units 04/28/22  1351 04/25/22  1128 04/25/22  0939   BLOOD CULTURE   --  No Growth at 72 hrs  No Growth at 72 hrs  GRAM STAIN RESULT  No Polys or Bacteria seen  --   --      Images in PACS personally reviewed  MRI left foot:  Small plantar abscess base of 5th toe       Findings of osteitis and probably superimposed stress response 5th toe   No specific findings of osteomyelitis   No fracture

## 2022-04-29 NOTE — PLAN OF CARE
Problem: Potential for Falls  Goal: Patient will remain free of falls  Description: INTERVENTIONS:  - Educate patient/family on patient safety including physical limitations  - Instruct patient to call for assistance with activity   - Consult OT/PT to assist with strengthening/mobility   - Keep Call bell within reach  - Keep bed low and locked with side rails adjusted as appropriate  - Keep care items and personal belongings within reach  - Initiate and maintain comfort rounds  - Make Fall Risk Sign visible to staff  - Offer Toileting every 2 Hours, in advance of need  - Initiate/Maintain bedalarm  - Apply yellow socks and bracelet for high fall risk patients  - Consider moving patient to room near nurses station  Outcome: Progressing     Problem: PAIN - ADULT  Goal: Verbalizes/displays adequate comfort level or baseline comfort level  Description: Interventions:  - Encourage patient to monitor pain and request assistance  - Assess pain using appropriate pain scale  - Administer analgesics based on type and severity of pain and evaluate response  - Implement non-pharmacological measures as appropriate and evaluate response  - Consider cultural and social influences on pain and pain management  - Notify physician/advanced practitioner if interventions unsuccessful or patient reports new pain  Outcome: Progressing     Problem: INFECTION - ADULT  Goal: Absence or prevention of progression during hospitalization  Description: INTERVENTIONS:  - Assess and monitor for signs and symptoms of infection  - Monitor lab/diagnostic results  - Monitor all insertion sites, i e  indwelling lines, tubes, and drains  - Monitor endotracheal if appropriate and nasal secretions for changes in amount and color  - [place] appropriate cooling/warming therapies per order  - Administer medications as ordered  - Instruct and encourage patient and family to use good hand hygiene technique  - Identify and instruct in appropriate isolation precautions for identified infection/condition  Outcome: Progressing  Goal: Absence of fever/infection during neutropenic period  Description: INTERVENTIONS:  - Monitor WBC    Outcome: Progressing     Problem: SAFETY ADULT  Goal: Patient will remain free of falls  Description: INTERVENTIONS:  - Educate patient/family on patient safety including physical limitations  - Instruct patient to call for assistance with activity   - Consult OT/PT to assist with strengthening/mobility   - Keep Call bell within reach  - Keep bed low and locked with side rails adjusted as appropriate  - Keep care items and personal belongings within reach  - Initiate and maintain comfort rounds  - Make Fall Risk Sign visible to staff  - Offer Toileting every 2 Hours, in advance of need  - Apply yellow socks and bracelet for high fall risk patients  - Consider moving patient to room near nurses station  Outcome: Progressing  Goal: Maintain or return to baseline ADL function  Description: INTERVENTIONS:  -  Assess patient's ability to carry out ADLs; assess patient's baseline for ADL function and identify physical deficits which impact ability to perform ADLs (bathing, care of mouth/teeth, toileting, grooming, dressing, etc )  - Assess/evaluate cause of self-care deficits   - Assess range of motion  - Assess patient's mobility; develop plan if impaired  - Assess patient's need for assistive devices and provide as appropriate  - Encourage maximum independence but intervene and supervise when necessary  - Involve family in performance of ADLs  - Assess for home care needs following discharge   - Consider OT consult to assist with ADL evaluation and planning for discharge  - Provide patient education as appropriate  Outcome: Progressing  Goal: Maintains/Returns to pre admission functional level  Description: INTERVENTIONS:  - Perform BMAT or MOVE assessment daily    - Set and communicate daily mobility goal to care team and patient/family/caregiver  - Collaborate with rehabilitation services on mobility goals if consulted  - Out of bed for toileting  - Record patient progress and toleration of activity level   Outcome: Progressing     Problem: DISCHARGE PLANNING  Goal: Discharge to home or other facility with appropriate resources  Description: INTERVENTIONS:  - Identify barriers to discharge w/patient and caregiver  - Arrange for needed discharge resources and transportation as appropriate  - Identify discharge learning needs (meds, wound care, etc )  - Arrange for interpretive services to assist at discharge as needed  - Refer to Case Management Department for coordinating discharge planning if the patient needs post-hospital services based on physician/advanced practitioner order or complex needs related to functional status, cognitive ability, or social support system  Outcome: Progressing     Problem: Knowledge Deficit  Goal: Patient/family/caregiver demonstrates understanding of disease process, treatment plan, medications, and discharge instructions  Description: Complete learning assessment and assess knowledge base    Interventions:  - Provide teaching at level of understanding  - Provide teaching via preferred learning methods  Outcome: Progressing

## 2022-04-29 NOTE — ASSESSMENT & PLAN NOTE
Lab Results   Component Value Date    HGBA1C 6 6 (H) 04/26/2022       Recent Labs     04/28/22  1422 04/28/22  1547 04/28/22  2101 04/29/22  0755   POCGLU 114 144* 168* 139       Blood Sugar Average: Last 72 hrs:  (P) 139 [id number]   Updated A1c 6 6%  Pt declines insulin  Will not start insulin sliding scale  He wants to remain on jardiance  Risks were explained to him  Will continue to monitor and if creatinine increases will hold

## 2022-04-29 NOTE — PLAN OF CARE
Problem: PHYSICAL THERAPY ADULT  Goal: Performs mobility at highest level of function for planned discharge setting  See evaluation for individualized goals  Description: Treatment/Interventions: Functional transfer training,LE strengthening/ROM,Elevations,Therapeutic exercise,Patient/family training,Equipment eval/education,Bed mobility,Gait training,Compensatory technique education,Continued evaluation,Spoke to nursing,OT          See flowsheet documentation for full assessment, interventions and recommendations  Note: Prognosis: Fair  Problem List: Impaired balance,Decreased mobility,Impaired judgement,Decreased safety awareness,Impaired sensation,Obesity,Decreased skin integrity,Pain (wb restrictions)  Assessment: Ed was seen for a re-evaluation now POD#1 L foot washout and same day wound vac placement  Requires increased supervision for transfers given change in wbs and lines  No difficulty maintaining NWB LLE during transfers w AD  Also able to maintain NWB during hopping w RW  Pt requesting to try crutches; given fair - balance w RW and ability to maintain NWB we did trial crutches this session per pt request  However pt with worse ambulatory balance w use of crutches  Very unsteady and difficulty coordinating crutches for steady gait  Require hands on assistance for stability/ safety  Require assist to manage wound vac throughout session as well as occasional cues for line awareness  Would recommend continued gait training w RW as at present this is the safest method of mobility to reduce fall risk and maintain NWB LLE  Will practice crutches per patient request to improve safety and gait sequencing but encourage RW as primary DME use  Would also recommend stair trial to dc to ensure safety and ability to maintain NWB     Barriers to Discharge: Inaccessible home environment  Barriers to Discharge Comments: steps     PT Discharge Recommendation: (S) Home with home health rehabilitation (v STR pending progress)          See flowsheet documentation for full assessment

## 2022-04-29 NOTE — PROGRESS NOTES
[address] [place] [address]  Progress Note Ramo Canales 1965, 59 y o  male MRN: [id number]  Unit/Bed#: [address] [place] [address] [id number] Encounter: [id number]  Primary Care Provider: Ally Monet MD   Date and time admitted to hospital: 4/25/2022  9:16 AM    * Diabetic foot ulcer ([address] )  Assessment & Plan  Diabetic left foot ulcer with purulent drainage and surrounding cellulitis  MRI left foot: small plantar abscess base of 5th toe, findings of osteitis, no specific OM, no fracture   Appreciate podiatry recommendations  S/p left foot washout: wound vac placed this am    weight bearing status: nonweight baring to LLE with darco wedge   Appreciate Infectious Disease consult recommendations, continue Unasyn and oral doxycycline  Blood cultures negative x 72 hours  Monitor fever curve, currently hemodynamically stable and nontoxic    H/O eye surgery  Assessment & Plan  Patient with recent surgery to his right eye  Ophthalmologist was contacted  Pt continued eye drops and eye has improved  Chronic kidney disease, stage 3 [facility])  Assessment & Plan  Lab Results   Component Value Date    EGFR 45 04/29/2022    EGFR 49 04/28/2022    EGFR 47 04/27/2022    CREATININE 1 62 (H) 04/29/2022    CREATININE 1 52 (H) 04/28/2022    CREATININE 1 57 (H) 04/27/2022   baseline creatinine is 1 4-1 6  Monitor renal function daily  Holding HCTZ lisinopril  Possibly restart in 24 to 48 hours    Class 2 severe obesity with serious comorbidity and body mass index (BMI) of 37 0 to 37 9 in adult [facility])  Assessment & Plan  Encourage diet and weight loss  BMI 37 8 noted    Diabetes mellitus [facility])  Assessment & Plan  Lab Results   Component Value Date    HGBA1C 6 6 (H) 04/26/2022       Recent Labs     04/28/22  1422 04/28/22  1547 04/28/22  2101 04/29/22  0755   POCGLU 114 144* 168* 139       Blood Sugar Average: Last 72 hrs:  (P) 139 [id number]   Updated A1c 6 6%  Pt declines insulin  Will not start insulin sliding scale     He wants to remain on jardiance  Risks were explained to him  Will continue to monitor and if creatinine increases will hold  Essential hypertension  Assessment & Plan  Continue Coreg  Holding HCTZ and lisinopril with low-normal blood pressure  Monitor vital signs and avoid hypotension      VTE Pharmacologic Prophylaxis: declined heparin     Mechanical VTE Prophylaxis in Place: Yes    Time Spent for Care: 20 minutes  More than 50% of total time spent on counseling and coordination of care as described above  Current Length of Stay: 4 day(s)  Current Patient Status: Inpatient     Discharge Plan / Estimated Discharge Date: awaiting clearance from podiatry and ID    Code Status: Level 1 - Full Code      Subjective:   Pt seen and examined  Pt doing well  Physical therapy is at bedside to work with pt  He states he feels a little nauseated but no f/c no cp no sob no n/v/d     Objective:     Vitals:   Temp (24hrs), Av 7 °F (36 5 °C), Min:97 °F (36 1 °C), Max:98 1 °F (36 7 °C)    Temp:  [97 °F (36 1 °C)-98 1 °F (36 7 °C)] 98 °F (36 7 °C)  HR:  [73-89] 81  Resp:  [16-18] 18  BP: (126-153)/(71-95) 137/84  SpO2:  [90 %-95 %] 94 %  Body mass index is 37 88 kg/m²  Input and Output Summary (last 24 hours):     No intake or output data in the 24 hours ending 22 1138    Physical Exam:   Physical Exam  Constitutional:       Appearance: Normal appearance  HENT:      Head: Normocephalic and atraumatic  Eyes:      Extraocular Movements: Extraocular movements intact  Pupils: Pupils are equal, round, and reactive to light  Cardiovascular:      Rate and Rhythm: Normal rate and regular rhythm  Heart sounds: No murmur heard  No friction rub  No gallop  Pulmonary:      Effort: Pulmonary effort is normal  No respiratory distress  Breath sounds: Normal breath sounds  No wheezing or rales  Abdominal:      General: Bowel sounds are normal  There is no distension  Palpations: Abdomen is soft  Tenderness: There is no abdominal tenderness  There is no guarding  Musculoskeletal:      Right lower leg: No edema  Skin:     Comments: Wound vac in place   Neurological:      Mental Status: He is alert and oriented to person, place, and time  Additional Data:     Labs:  Results from last 7 days   Lab Units 04/29/22  0535 04/28/22  0335 04/28/22  0335   WBC Thousand/uL 4 47   < > 4 32   HEMOGLOBIN g/dL 11 5*   < > 12 6   HEMATOCRIT % 34 9*   < > 37 3   PLATELETS Thousands/uL 205   < > 191   NEUTROS PCT %  --   --  50   LYMPHS PCT %  --   --  31   MONOS PCT %  --   --  12   EOS PCT %  --   --  6    < > = values in this interval not displayed  Results from last 7 days   Lab Units 04/29/22  0535   SODIUM mmol/L 135*   POTASSIUM mmol/L 4 5   CHLORIDE mmol/L 100   CO2 mmol/L 27   BUN mg/dL 37*   CREATININE mg/dL 1 62*   ANION GAP mmol/L 8   CALCIUM mg/dL 8 6   GLUCOSE RANDOM mg/dL 150*         Results from last 7 days   Lab Units 04/29/22  0755 04/28/22  2101 04/28/22  1547 04/28/22  1422 04/28/22  1216 04/28/22  0705 04/27/22  2113 04/27/22  1627 04/27/22  1123 04/27/22  0726 04/26/22  2108 04/26/22  1637   POC GLUCOSE mg/dl 139 168* 144* 114 120 114 167* 126 130 168* 147* 148*     Results from last 7 days   Lab Units 04/26/22  0415   HEMOGLOBIN A1C % 6 6*           Recent Cultures (last 7 days):     Results from last 7 days   Lab Units 04/28/22  1351 04/25/22  1128 04/25/22  0939   BLOOD CULTURE   --  No Growth at 72 hrs  No Growth at 72 hrs     GRAM STAIN RESULT  No Polys or Bacteria seen  --   --        Lines/Drains:  Invasive Devices  Report    Peripheral Intravenous Line            Peripheral IV 04/29/22 Left;Ventral (anterior) Forearm <1 day                Last 24 Hours Medication List:   Current Facility-Administered Medications   Medication Dose Route Frequency Provider Last Rate    acetaminophen  650 mg Oral Q6H PRN Claudy Achilles, DPM      ampicillin-sulbactam  3 g Intravenous Q6H Claudy Von, DPM 3 g (04/29/22 0820)    carvedilol  25 mg Oral BID With Meals Robles Marjorie, DPM      doxycycline hyclate  100 mg Oral Q12H [address] Robles Marjorie, DPM      gentamicin  1 drop Right Eye BID Robles Marjorie, DPM      glycerin-hypromellose-  1 drop Both Eyes Q6H PRN Robles Marjorie, DPM      HYDROmorphone  0 5 mg Intravenous Q4H PRN Robles Marjorie, DPM      Loteprednol Etabonate   Right Eye BID Robles Marjorie, DPM      melatonin  6 mg Oral HS Robles Marjorie, DPM      ondansetron  4 mg Intravenous Q6H PRN Robles Marjorie, DPM      pregabalin  75 mg Oral Daily Robles Marjorie, DPM      traMADol  50 mg Oral Q6H PRN Robles Marjorie, DPM          Today, Patient Was Seen By: Cherise Olivera, DO

## 2022-04-29 NOTE — ASSESSMENT & PLAN NOTE
Lab Results   Component Value Date    EGFR 45 04/29/2022    EGFR 49 04/28/2022    EGFR 47 04/27/2022    CREATININE 1 62 (H) 04/29/2022    CREATININE 1 52 (H) 04/28/2022    CREATININE 1 57 (H) 04/27/2022   baseline creatinine is 1 4-1 6  Monitor renal function daily  Holding HCTZ lisinopril   Possibly restart in 24 to 48 hours

## 2022-04-29 NOTE — PLAN OF CARE
Problem: Potential for Falls  Goal: Patient will remain free of falls  Description: INTERVENTIONS:  - Educate patient/family on patient safety including physical limitations  - Instruct patient to call for assistance with activity   - Consult OT/PT to assist with strengthening/mobility   - Keep Call bell within reach  - Keep bed low and locked with side rails adjusted as appropriate  - Keep care items and personal belongings within reach  - Initiate and maintain comfort rounds  - Make Fall Risk Sign visible to staff  - Offer Toileting every  Hours, in advance of need  - Initiate/Maintain alarm  - Obtain necessary fall risk management equipment  - Apply yellow socks and bracelet for high fall risk patients  - Consider moving patient to room near nurses station  Outcome: Progressing     Problem: INFECTION - ADULT  Goal: Absence or prevention of progression during hospitalization  Description: INTERVENTIONS:  - Assess and monitor for signs and symptoms of infection  - Monitor lab/diagnostic results  - Monitor all insertion sites, i e  indwelling lines, tubes, and drains  - Monitor endotracheal if appropriate and nasal secretions for changes in amount and color  - [place] appropriate cooling/warming therapies per order  - Administer medications as ordered  - Instruct and encourage patient and family to use good hand hygiene technique  - Identify and instruct in appropriate isolation precautions for identified infection/condition  Outcome: Progressing  Goal: Absence of fever/infection during neutropenic period  Description: INTERVENTIONS:  - Monitor WBC    Outcome: Progressing

## 2022-04-30 LAB
ANION GAP SERPL CALCULATED.3IONS-SCNC: 7 MMOL/L (ref 4–13)
BACTERIA BLD CULT: NORMAL
BACTERIA BLD CULT: NORMAL
BACTERIA SPEC ANAEROBE CULT: NORMAL
BACTERIA TISS AEROBE CULT: ABNORMAL
BUN SERPL-MCNC: 34 MG/DL (ref 5–25)
CALCIUM SERPL-MCNC: 9.1 MG/DL (ref 8.3–10.1)
CHLORIDE SERPL-SCNC: 102 MMOL/L (ref 100–108)
CO2 SERPL-SCNC: 27 MMOL/L (ref 21–32)
CREAT SERPL-MCNC: 1.58 MG/DL (ref 0.6–1.3)
ERYTHROCYTE [DISTWIDTH] IN BLOOD BY AUTOMATED COUNT: 12.4 % (ref 11.6–15.1)
GFR SERPL CREATININE-BSD FRML MDRD: 47 ML/MIN/1.73SQ M
GLUCOSE SERPL-MCNC: 118 MG/DL (ref 65–140)
GLUCOSE SERPL-MCNC: 128 MG/DL (ref 65–140)
GLUCOSE SERPL-MCNC: 134 MG/DL (ref 65–140)
GLUCOSE SERPL-MCNC: 156 MG/DL (ref 65–140)
GLUCOSE SERPL-MCNC: 174 MG/DL (ref 65–140)
GRAM STN SPEC: ABNORMAL
HCT VFR BLD AUTO: 34.5 % (ref 36.5–49.3)
HGB BLD-MCNC: 11.6 G/DL (ref 12–17)
MCH RBC QN AUTO: 32.1 PG (ref 26.8–34.3)
MCHC RBC AUTO-ENTMCNC: 33.6 G/DL (ref 31.4–37.4)
MCV RBC AUTO: 96 FL (ref 82–98)
PLATELET # BLD AUTO: 198 THOUSANDS/UL (ref 149–390)
PMV BLD AUTO: 10 FL (ref 8.9–12.7)
POTASSIUM SERPL-SCNC: 4.2 MMOL/L (ref 3.5–5.3)
RBC # BLD AUTO: 3.61 MILLION/UL (ref 3.88–5.62)
SODIUM SERPL-SCNC: 136 MMOL/L (ref 136–145)
WBC # BLD AUTO: 4.89 THOUSAND/UL (ref 4.31–10.16)

## 2022-04-30 PROCEDURE — 82948 REAGENT STRIP/BLOOD GLUCOSE: CPT

## 2022-04-30 PROCEDURE — 99232 SBSQ HOSP IP/OBS MODERATE 35: CPT | Performed by: INTERNAL MEDICINE

## 2022-04-30 PROCEDURE — 80048 BASIC METABOLIC PNL TOTAL CA: CPT | Performed by: INTERNAL MEDICINE

## 2022-04-30 PROCEDURE — 85027 COMPLETE CBC AUTOMATED: CPT | Performed by: INTERNAL MEDICINE

## 2022-04-30 PROCEDURE — 99231 SBSQ HOSP IP/OBS SF/LOW 25: CPT | Performed by: STUDENT IN AN ORGANIZED HEALTH CARE EDUCATION/TRAINING PROGRAM

## 2022-04-30 RX ADMIN — AMPICILLIN SODIUM AND SULBACTAM SODIUM 3 G: 10; 5 INJECTION, POWDER, FOR SOLUTION INTRAVENOUS at 02:57

## 2022-04-30 RX ADMIN — ACETAMINOPHEN 325MG 650 MG: 325 TABLET ORAL at 07:56

## 2022-04-30 RX ADMIN — PIPERACILLIN SODIUM AND TAZOBACTAM SODIUM 4.5 G: 4; .5 INJECTION, POWDER, LYOPHILIZED, FOR SOLUTION INTRAVENOUS at 14:56

## 2022-04-30 RX ADMIN — HYDROMORPHONE HYDROCHLORIDE 0.5 MG: 1 INJECTION, SOLUTION INTRAMUSCULAR; INTRAVENOUS; SUBCUTANEOUS at 20:16

## 2022-04-30 RX ADMIN — HYDROMORPHONE HYDROCHLORIDE 0.5 MG: 1 INJECTION, SOLUTION INTRAMUSCULAR; INTRAVENOUS; SUBCUTANEOUS at 15:01

## 2022-04-30 RX ADMIN — PIPERACILLIN SODIUM AND TAZOBACTAM SODIUM 4.5 G: 4; .5 INJECTION, POWDER, LYOPHILIZED, FOR SOLUTION INTRAVENOUS at 20:16

## 2022-04-30 RX ADMIN — GENTAMICIN SULFATE 1 DROP: 3 SOLUTION/ DROPS OPHTHALMIC at 20:16

## 2022-04-30 RX ADMIN — GENTAMICIN SULFATE 1 DROP: 3 SOLUTION/ DROPS OPHTHALMIC at 08:59

## 2022-04-30 RX ADMIN — CARVEDILOL 25 MG: 25 TABLET, FILM COATED ORAL at 20:16

## 2022-04-30 RX ADMIN — TRAMADOL HYDROCHLORIDE 50 MG: 50 TABLET, COATED ORAL at 07:56

## 2022-04-30 RX ADMIN — CARVEDILOL 25 MG: 25 TABLET, FILM COATED ORAL at 07:56

## 2022-04-30 RX ADMIN — PREGABALIN 75 MG: 75 CAPSULE ORAL at 20:16

## 2022-04-30 RX ADMIN — MELATONIN 6 MG: at 21:56

## 2022-04-30 RX ADMIN — GLYCERIN, HYPROMELLOSE, POLYETHYLENE GLYCOL 1 DROP: .2; .2; 1 LIQUID OPHTHALMIC at 20:16

## 2022-04-30 RX ADMIN — HYDROMORPHONE HYDROCHLORIDE 0.5 MG: 1 INJECTION, SOLUTION INTRAMUSCULAR; INTRAVENOUS; SUBCUTANEOUS at 02:57

## 2022-04-30 RX ADMIN — AMPICILLIN SODIUM AND SULBACTAM SODIUM 3 G: 10; 5 INJECTION, POWDER, FOR SOLUTION INTRAVENOUS at 08:59

## 2022-04-30 RX ADMIN — LOTEPREDNOL ETABONATE: 10 SUSPENSION TOPICAL at 08:59

## 2022-04-30 RX ADMIN — LOTEPREDNOL ETABONATE: 10 SUSPENSION TOPICAL at 20:16

## 2022-04-30 RX ADMIN — DOXYCYCLINE 100 MG: 100 CAPSULE ORAL at 08:59

## 2022-04-30 NOTE — ASSESSMENT & PLAN NOTE
Continue Coreg  Holding HCTZ and lisinopril with low-normal blood pressure- likely restart tomorrow   Monitor vital signs and avoid hypotension

## 2022-04-30 NOTE — ASSESSMENT & PLAN NOTE
Lab Results   Component Value Date    EGFR 47 04/30/2022    EGFR 45 04/29/2022    EGFR 49 04/28/2022    CREATININE 1 58 (H) 04/30/2022    CREATININE 1 62 (H) 04/29/2022    CREATININE 1 52 (H) 04/28/2022   baseline creatinine is 1 4-1 6  Monitor renal function daily  Holding HCTZ lisinopril   Possibly restart in 24 hours

## 2022-04-30 NOTE — PROGRESS NOTES
Progress Note - Infectious Disease   Romaine Dietz 59 y o  male MRN: [id number]  Unit/Bed#: [address] 224-01 Encounter: [id number]      Impression/Plan:    1  Infected left diabetic foot ulcer-persistent despite outpatient oral antibiotics  Outpatient wound culture grew an E coli and mixed skin mendez  Patient hemodynamically stable  Blood cultures no growth  His antibiotics are limited because he has had a severe rash with cephalosporins, however he tolerates penicillins without difficulty  MRI of the foot shows a fluid collection at the plantar 5th MTPJ without definitive signs of osteomyelitis  Status post OR debridement 4/28/22 of necrotic wound  No purulence or sinus tracts seen, underlying tissue and bone appeared healthy  Wound vac now placed  OR culture is now gorwing few colonies Pseudomonas aeruginosa  The patient has been clinically improving despite lack of antibiotic coverage for this so I suspect source control with surgery was the main issue   -stop Unasyn and doxycycline  -start Zosyn 4 5g IV q6hr  -at discharge, recommend transition to PO levofloxacin 750mg daily to complete a 7 day total course from antibiotic switch, through 5/7/22  -okay for discharge from ID perspective when wound vac delivered and cleared by Podiatry  -local wound care  -close podiatry follow-up     2  Diabetes mellitus-type 2  Without long-term insulin use    3  Chronic kidney disease stage 3  Creatinine elevated on admission, now improving   -recheck BMP  -dose adjusted antibiotics as needed  -volume management     4  Obesity-with a body mass index of greater than 37  Risk factor for complicating infection and relapse   -stress weight loss through diet and exercise    Discussed the above management plan with the primary service  ID will follow while he remains inpatient    Antibiotics:  Unasyn day 6  Doxycycline day 6    Subjective:  Patient feeling well  No fever, chills, chest pain, shortness of breath   Minimal pain in foot     Objective:  Vitals:  Temp:  [98 2 °F (36 8 °C)-98 6 °F (37 °C)] 98 2 °F (36 8 °C)  HR:  [80-94] 80  BP: (126-151)/(88-93) 145/91  SpO2:  [89 %-94 %] 94 %  Temp (24hrs), Av 4 °F (36 9 °C), Min:98 2 °F (36 8 °C), Max:98 6 °F (37 °C)  Current: Temperature: 98 2 °F (36 8 °C)    Physical Exam:   General Appearance:  Alert, interactive, nontoxic, no acute distress  Throat: Oropharynx moist without lesions  Lungs:   Clear to auscultation bilaterally; no wheezes, rhonchi or rales; respirations unlabored   Heart:  RRR; no murmur, rub or gallop   Abdomen:   Soft, non-tender, non-distended, positive bowel sounds  Extremities: No clubbing, cyanosis  Left foot dressed with a dry dressing in place  Reviewed new images in epic with notable erythema on the dorsum of the foot and necrotic wound  Skin: No new rashes or lesions  No draining wounds noted  Labs, Imaging, & Other studies:   All pertinent labs and imaging studies were personally reviewed  Results from last 7 days   Lab Units 22  0545 22  0535 22  0335   WBC Thousand/uL 4 89 4 47 4 32   HEMOGLOBIN g/dL 11 6* 11 5* 12 6   PLATELETS Thousands/uL 198 205 191     Results from last 7 days   Lab Units 22  0545 22  0535 22  0535 22  0335 22  0335   SODIUM mmol/L 136  --  135*  --  137   POTASSIUM mmol/L 4 2   < > 4 5   < > 4 6   CHLORIDE mmol/L 102   < > 100   < > 101   CO2 mmol/L 27   < > 27   < > 27   BUN mg/dL 34*   < > 37*   < > 38*   CREATININE mg/dL 1 58*   < > 1 62*   < > 1 52*   EGFR ml/min/1 73sq m 47   < > 45   < > 49   CALCIUM mg/dL 9 1   < > 8 6   < > 8 8    < > = values in this interval not displayed  Results from last 7 days   Lab Units 22  1351 22  1128 22  0939   BLOOD CULTURE   --  No Growth After 5 Days  No Growth After 5 Days  GRAM STAIN RESULT  No Polys or Bacteria seen  --   --      Images in PACS personally reviewed      MRI left foot:  Small plantar abscess base of 5th toe       Findings of osteitis and probably superimposed stress response 5th toe   No specific findings of osteomyelitis   No fracture

## 2022-04-30 NOTE — ASSESSMENT & PLAN NOTE
Diabetic left foot ulcer with purulent drainage and surrounding cellulitis  MRI left foot: small plantar abscess base of 5th toe, findings of osteitis, no specific OM, no fracture   Appreciate podiatry recommendations  S/p left foot washout: wound vac placed this am and will be obtained for home  weight bearing status: nonweight baring to LLE with darco wedge   Appreciate Infectious Disease consult recommendations, continue Unasyn and oral doxycycline  Cultures show few pseudomonas  Awaiting ID follow up for ongoing antibiotic recommendations  Blood cultures negative x 72 hours     Monitor fever curve, currently hemodynamically stable and nontoxic

## 2022-04-30 NOTE — ASSESSMENT & PLAN NOTE
Diabetic left foot ulcer with purulent drainage and surrounding cellulitis  MRI left foot: small plantar abscess base of 5th toe, findings of osteitis, no specific OM, no fracture   Appreciate podiatry recommendations  S/p left foot washout: wound vac placed this am and will be obtained for home  weight bearing status: nonweight baring to LLE with darco wedge   Appreciate Infectious Disease consult recommendations, had been treated with Unasyn and oral doxycycline  Cultures show few pseudomonas  Changed to zosynPt will be changed to levaquin 750mg until 5/7/22  Blood cultures negative x 72 hours     Monitor fever curve, currently hemodynamically stable and nontoxic

## 2022-04-30 NOTE — PROGRESS NOTES
[address]  Progress Note Mukesh Randall 1965, 59 y o  male MRN: [id number]  Unit/Bed#: [id number] Encounter: [id number]  Primary Care Provider: Lisette Booker MD   Date and time admitted to hospital: 4/25/2022  9:16 AM    * Diabetic foot ulcer ([address] )  Assessment & Plan  Diabetic left foot ulcer with purulent drainage and surrounding cellulitis  MRI left foot: small plantar abscess base of 5th toe, findings of osteitis, no specific OM, no fracture   Appreciate podiatry recommendations  S/p left foot washout: wound vac placed this am and will be obtained for home  weight bearing status: nonweight baring to LLE with darco wedge   Appreciate Infectious Disease consult recommendations  Had been on Unasyn and oral doxycycline  Cultures show few pseudomonas  Changed to zosyn   Blood cultures negative x 72 hours  Monitor fever curve, currently hemodynamically stable and nontoxic    H/O eye surgery  Assessment & Plan  Patient with recent surgery to his right eye  Ophthalmologist was contacted  Pt continued eye drops and eye has improved  Chronic kidney disease, stage 3 [facility])  Assessment & Plan  Lab Results   Component Value Date    EGFR 47 04/30/2022    EGFR 45 04/29/2022    EGFR 49 04/28/2022    CREATININE 1 58 (H) 04/30/2022    CREATININE 1 62 (H) 04/29/2022    CREATININE 1 52 (H) 04/28/2022   baseline creatinine is 1 4-1 6  Monitor renal function daily  Holding HCTZ lisinopril   Possibly restart in 24 hours    Class 2 severe obesity with serious comorbidity and body mass index (BMI) of 37 0 to 37 9 in adult [facility])  Assessment & Plan  Encourage diet and weight loss  BMI 37 8 noted    Diabetes mellitus [facility])  Assessment & Plan  Lab Results   Component Value Date    HGBA1C 6 6 (H) 04/26/2022       Recent Labs     04/29/22 2055 04/30/22  0814 04/30/22  1042 04/30/22  1606   POCGLU 159* 118 156* 174*       Blood Sugar Average: Last 72 hrs:  (P) 140 0625   Updated A1c 6 6%  Pt declines insulin  Will not start insulin sliding scale  He wants to remain on jardiance  Risks were explained to him  Will continue to monitor and if creatinine increases will hold  Essential hypertension  Assessment & Plan  Continue Coreg  Holding HCTZ and lisinopril with low-normal blood pressure- likely restart tomorrow   Monitor vital signs and avoid hypotension        VTE Pharmacologic Prophylaxis: declined heparin     Mechanical VTE Prophylaxis in Place: Yes    Time Spent for Care: 20 minutes  More than 50% of total time spent on counseling and coordination of care as described above  Current Length of Stay: 5 day(s)  Current Patient Status: Inpatient     Discharge Plan / Estimated Discharge Date: likely d/c on Monday after wound vac is obtained for home and cleared by podiatry along with ID    Code Status: Level 1 - Full Code      Subjective:   Pt seen and examined  Pt doing well  No f/c no cp no sob no n/v/d no abd pain  Objective:     Vitals:   Temp (24hrs), Av 4 °F (36 9 °C), Min:98 2 °F (36 8 °C), Max:98 6 °F (37 °C)    Temp:  [98 2 °F (36 8 °C)-98 6 °F (37 °C)] 98 2 °F (36 8 °C)  HR:  [80-94] 80  BP: (126-151)/(88-93) 145/91  SpO2:  [89 %-94 %] 94 %  Body mass index is 37 88 kg/m²  Input and Output Summary (last 24 hours):     No intake or output data in the 24 hours ending 22 1616    Physical Exam:   Physical Exam  Constitutional:       Appearance: Normal appearance  HENT:      Head: Normocephalic and atraumatic  Eyes:      Extraocular Movements: Extraocular movements intact  Pupils: Pupils are equal, round, and reactive to light  Cardiovascular:      Rate and Rhythm: Normal rate and regular rhythm  Heart sounds: No murmur heard  No friction rub  No gallop  Pulmonary:      Effort: Pulmonary effort is normal  No respiratory distress  Breath sounds: Normal breath sounds  No wheezing or rales     Abdominal:      General: Bowel sounds are normal  There is no distension  Palpations: Abdomen is soft  Tenderness: There is no abdominal tenderness  There is no guarding  Neurological:      Mental Status: He is alert and oriented to person, place, and time  Additional Data:     Labs:  Results from last 7 days   Lab Units 04/30/22  0545 04/29/22  0535 04/28/22  0335   WBC Thousand/uL 4 89   < > 4 32   HEMOGLOBIN g/dL 11 6*   < > 12 6   HEMATOCRIT % 34 5*   < > 37 3   PLATELETS Thousands/uL 198   < > 191   NEUTROS PCT %  --   --  50   LYMPHS PCT %  --   --  31   MONOS PCT %  --   --  12   EOS PCT %  --   --  6    < > = values in this interval not displayed  Results from last 7 days   Lab Units 04/30/22  0545   SODIUM mmol/L 136   POTASSIUM mmol/L 4 2   CHLORIDE mmol/L 102   CO2 mmol/L 27   BUN mg/dL 34*   CREATININE mg/dL 1 58*   ANION GAP mmol/L 7   CALCIUM mg/dL 9 1   GLUCOSE RANDOM mg/dL 134         Results from last 7 days   Lab Units 04/30/22  1606 04/30/22  1042 04/30/22  0814 04/29/22  2055 04/29/22  1623 04/29/22  1141 04/29/22  0755 04/28/22  2101 04/28/22  1547 04/28/22  1422 04/28/22  1216 04/28/22  0705   POC GLUCOSE mg/dl 174* 156* 118 159* 121 123 139 168* 144* 114 120 114     Results from last 7 days   Lab Units 04/26/22  0415   HEMOGLOBIN A1C % 6 6*           Recent Cultures (last 7 days):     Results from last 7 days   Lab Units 04/28/22  1351 04/25/22  1128 04/25/22  0939   BLOOD CULTURE   --  No Growth After 5 Days  No Growth After 5 Days     GRAM STAIN RESULT  No Polys or Bacteria seen  --   --        Lines/Drains:  Invasive Devices  Report    Peripheral Intravenous Line            Peripheral IV 04/29/22 Left;Ventral (anterior) Forearm 1 day    Peripheral IV 04/29/22 Right;Ventral (anterior) Forearm <1 day              Last 24 Hours Medication List:   Current Facility-Administered Medications   Medication Dose Route Frequency Provider Last Rate    acetaminophen  650 mg Oral Q6H PRN Danyell Jorge DPM      carvedilol  25 mg Oral BID With Meals Arnette Mass, DPM      gentamicin  1 drop Right Eye BID Arnette Mass, DPM      glycerin-hypromellose-  1 drop Both Eyes Q6H PRN Arnette Mass, DPM      HYDROmorphone  0 5 mg Intravenous Q4H PRN Arnette Mass, DPM      Loteprednol Etabonate   Right Eye BID Arnette Mass, DPM      melatonin  6 mg Oral HS Arnlong Mass, DPM      ondansetron  4 mg Intravenous Q6H PRN Quintin Mass, DPM      piperacillin-tazobactam  4 5 g Intravenous Q6H Trev Nowak MD 4 5 g (04/30/22 5026)    pregabalin  75 mg Oral Daily Arnlong Mass, DPM      traMADol  50 mg Oral Q6H PRN Arnette Mass, DPM          Today, Patient Was Seen By: Pérez Jacobs DO

## 2022-04-30 NOTE — ASSESSMENT & PLAN NOTE
Lab Results   Component Value Date    HGBA1C 6 6 (H) 04/26/2022       Recent Labs     04/29/22 2055 04/30/22  0814 04/30/22  1042 04/30/22  1606   POCGLU 159* 118 156* 174*       Blood Sugar Average: Last 72 hrs:  (P) 140 0625   Updated A1c 6 6%  Pt declines insulin  Will not start insulin sliding scale  He wants to remain on jardiance  Risks were explained to him  Will continue to monitor and if creatinine increases will hold

## 2022-04-30 NOTE — PLAN OF CARE
Problem: Potential for Falls  Goal: Patient will remain free of falls  Description: INTERVENTIONS:  - Educate patient/family on patient safety including physical limitations  - Instruct patient to call for assistance with activity   - Consult OT/PT to assist with strengthening/mobility   - Keep Call bell within reach  - Keep bed low and locked with side rails adjusted as appropriate  - Keep care items and personal belongings within reach  - Initiate and maintain comfort rounds  - Make Fall Risk Sign visible to staff  - Offer Toileting every 2 Hours, in advance of need  - Apply yellow socks and bracelet for high fall risk patients  - Consider moving patient to room near nurses station  Outcome: Progressing     Problem: PAIN - ADULT  Goal: Verbalizes/displays adequate comfort level or baseline comfort level  Description: Interventions:  - Encourage patient to monitor pain and request assistance  - Assess pain using appropriate pain scale  - Administer analgesics based on type and severity of pain and evaluate response  - Implement non-pharmacological measures as appropriate and evaluate response  - Consider cultural and social influences on pain and pain management  - Notify physician/advanced practitioner if interventions unsuccessful or patient reports new pain  Outcome: Progressing     Problem: INFECTION - ADULT  Goal: Absence or prevention of progression during hospitalization  Description: INTERVENTIONS:  - Assess and monitor for signs and symptoms of infection  - Monitor lab/diagnostic results  - Monitor all insertion sites, i e  indwelling lines, tubes, and drains  - Monitor endotracheal if appropriate and nasal secretions for changes in amount and color  - [place] appropriate cooling/warming therapies per order  - Administer medications as ordered  - Instruct and encourage patient and family to use good hand hygiene technique  - Identify and instruct in appropriate isolation precautions for identified infection/condition  Outcome: Progressing  Goal: Absence of fever/infection during neutropenic period  Description: INTERVENTIONS:  - Monitor WBC    Outcome: Progressing     Problem: SAFETY ADULT  Goal: Patient will remain free of falls  Description: INTERVENTIONS:  - Educate patient/family on patient safety including physical limitations  - Instruct patient to call for assistance with activity   - Consult OT/PT to assist with strengthening/mobility   - Keep Call bell within reach  - Keep bed low and locked with side rails adjusted as appropriate  - Keep care items and personal belongings within reach  - Initiate and maintain comfort rounds  - Make Fall Risk Sign visible to staff  - Apply yellow socks and bracelet for high fall risk patients  - Consider moving patient to room near nurses station  Outcome: Progressing  Goal: Maintain or return to baseline ADL function  Description: INTERVENTIONS:  -  Assess patient's ability to carry out ADLs; assess patient's baseline for ADL function and identify physical deficits which impact ability to perform ADLs (bathing, care of mouth/teeth, toileting, grooming, dressing, etc )  - Assess/evaluate cause of self-care deficits   - Assess range of motion  - Assess patient's mobility; develop plan if impaired  - Assess patient's need for assistive devices and provide as appropriate  - Encourage maximum independence but intervene and supervise when necessary  - Involve family in performance of ADLs  - Assess for home care needs following discharge   - Consider OT consult to assist with ADL evaluation and planning for discharge  - Provide patient education as appropriate  Outcome: Progressing  Goal: Maintains/Returns to pre admission functional level  Description: INTERVENTIONS:  - Perform BMAT or MOVE assessment daily    - Set and communicate daily mobility goal to care team and patient/family/caregiver     - Collaborate with rehabilitation services on mobility goals if consulted  - Out of bed for toileting  - Record patient progress and toleration of activity level   Outcome: Progressing     Problem: DISCHARGE PLANNING  Goal: Discharge to home or other facility with appropriate resources  Description: INTERVENTIONS:  - Identify barriers to discharge w/patient and caregiver  - Arrange for needed discharge resources and transportation as appropriate  - Identify discharge learning needs (meds, wound care, etc )  - Arrange for interpretive services to assist at discharge as needed  - Refer to Case Management Department for coordinating discharge planning if the patient needs post-hospital services based on physician/advanced practitioner order or complex needs related to functional status, cognitive ability, or social support system  Outcome: Progressing     Problem: Knowledge Deficit  Goal: Patient/family/caregiver demonstrates understanding of disease process, treatment plan, medications, and discharge instructions  Description: Complete learning assessment and assess knowledge base    Interventions:  - Provide teaching at level of understanding  - Provide teaching via preferred learning methods  Outcome: Progressing     Problem: MOBILITY - ADULT  Goal: Maintain or return to baseline ADL function  Description: INTERVENTIONS:  -  Assess patient's ability to carry out ADLs; assess patient's baseline for ADL function and identify physical deficits which impact ability to perform ADLs (bathing, care of mouth/teeth, toileting, grooming, dressing, etc )  - Assess/evaluate cause of self-care deficits   - Assess range of motion  - Assess patient's mobility; develop plan if impaired  - Assess patient's need for assistive devices and provide as appropriate  - Encourage maximum independence but intervene and supervise when necessary  - Involve family in performance of ADLs  - Assess for home care needs following discharge   - Consider OT consult to assist with ADL evaluation and planning for discharge  - Provide patient education as appropriate  Outcome: Progressing  Goal: Maintains/Returns to pre admission functional level  Description: INTERVENTIONS:  - Perform BMAT or MOVE assessment daily    - Set and communicate daily mobility goal to care team and patient/family/caregiver     - Collaborate with rehabilitation services on mobility goals if consulted  - Out of bed for toileting  - Record patient progress and toleration of activity level   Outcome: Progressing

## 2022-04-30 NOTE — PLAN OF CARE
Problem: Potential for Falls  Goal: Patient will remain free of falls  Description: INTERVENTIONS:  - Educate patient/family on patient safety including physical limitations  - Instruct patient to call for assistance with activity   - Consult OT/PT to assist with strengthening/mobility   - Keep Call bell within reach  - Keep bed low and locked with side rails adjusted as appropriate  - Keep care items and personal belongings within reach  - Initiate and maintain comfort rounds  - Make Fall Risk Sign visible to staff  - Apply yellow socks and bracelet for high fall risk patients  - Consider moving patient to room near nurses station  Outcome: Progressing     Problem: PAIN - ADULT  Goal: Verbalizes/displays adequate comfort level or baseline comfort level  Description: Interventions:  - Encourage patient to monitor pain and request assistance  - Assess pain using appropriate pain scale  - Administer analgesics based on type and severity of pain and evaluate response  - Implement non-pharmacological measures as appropriate and evaluate response  - Consider cultural and social influences on pain and pain management  - Notify physician/advanced practitioner if interventions unsuccessful or patient reports new pain  Outcome: Progressing     Problem: INFECTION - ADULT  Goal: Absence or prevention of progression during hospitalization  Description: INTERVENTIONS:  - Assess and monitor for signs and symptoms of infection  - Monitor lab/diagnostic results  - Monitor all insertion sites, i e  indwelling lines, tubes, and drains  - Monitor endotracheal if appropriate and nasal secretions for changes in amount and color  - [place] appropriate cooling/warming therapies per order  - Administer medications as ordered  - Instruct and encourage patient and family to use good hand hygiene technique  - Identify and instruct in appropriate isolation precautions for identified infection/condition  Outcome: Progressing  Goal: Absence of fever/infection during neutropenic period  Description: INTERVENTIONS:  - Monitor WBC    Outcome: Progressing     Problem: SAFETY ADULT  Goal: Patient will remain free of falls  Description: INTERVENTIONS:  - Educate patient/family on patient safety including physical limitations  - Instruct patient to call for assistance with activity   - Consult OT/PT to assist with strengthening/mobility   - Keep Call bell within reach  - Keep bed low and locked with side rails adjusted as appropriate  - Keep care items and personal belongings within reach  - Initiate and maintain comfort rounds  - Make Fall Risk Sign visible to staff  - Apply yellow socks and bracelet for high fall risk patients  - Consider moving patient to room near nurses station  Outcome: Progressing  Goal: Maintain or return to baseline ADL function  Description: INTERVENTIONS:  -  Assess patient's ability to carry out ADLs; assess patient's baseline for ADL function and identify physical deficits which impact ability to perform ADLs (bathing, care of mouth/teeth, toileting, grooming, dressing, etc )  - Assess/evaluate cause of self-care deficits   - Assess range of motion  - Assess patient's mobility; develop plan if impaired  - Assess patient's need for assistive devices and provide as appropriate  - Encourage maximum independence but intervene and supervise when necessary  - Involve family in performance of ADLs  - Assess for home care needs following discharge   - Consider OT consult to assist with ADL evaluation and planning for discharge  - Provide patient education as appropriate  Outcome: Progressing  Goal: Maintains/Returns to pre admission functional level  Description: INTERVENTIONS:  - Perform BMAT or MOVE assessment daily    - Set and communicate daily mobility goal to care team and patient/family/caregiver     - Collaborate with rehabilitation services on mobility goals if consulted  - Out of bed for toileting  - Record patient progress and toleration of activity level   Outcome: Progressing     Problem: DISCHARGE PLANNING  Goal: Discharge to home or other facility with appropriate resources  Description: INTERVENTIONS:  - Identify barriers to discharge w/patient and caregiver  - Arrange for needed discharge resources and transportation as appropriate  - Identify discharge learning needs (meds, wound care, etc )  - Arrange for interpretive services to assist at discharge as needed  - Refer to Case Management Department for coordinating discharge planning if the patient needs post-hospital services based on physician/advanced practitioner order or complex needs related to functional status, cognitive ability, or social support system  Outcome: Progressing     Problem: Knowledge Deficit  Goal: Patient/family/caregiver demonstrates understanding of disease process, treatment plan, medications, and discharge instructions  Description: Complete learning assessment and assess knowledge base    Interventions:  - Provide teaching at level of understanding  - Provide teaching via preferred learning methods  Outcome: Progressing     Problem: MOBILITY - ADULT  Goal: Maintain or return to baseline ADL function  Description: INTERVENTIONS:  -  Assess patient's ability to carry out ADLs; assess patient's baseline for ADL function and identify physical deficits which impact ability to perform ADLs (bathing, care of mouth/teeth, toileting, grooming, dressing, etc )  - Assess/evaluate cause of self-care deficits   - Assess range of motion  - Assess patient's mobility; develop plan if impaired  - Assess patient's need for assistive devices and provide as appropriate  - Encourage maximum independence but intervene and supervise when necessary  - Involve family in performance of ADLs  - Assess for home care needs following discharge   - Consider OT consult to assist with ADL evaluation and planning for discharge  - Provide patient education as appropriate  Outcome: Progressing  Goal: Maintains/Returns to pre admission functional level  Description: INTERVENTIONS:  - Perform BMAT or MOVE assessment daily    - Set and communicate daily mobility goal to care team and patient/family/caregiver     - Collaborate with rehabilitation services on mobility goals if consulted  - Out of bed for toileting  - Record patient progress and toleration of activity level   Outcome: Progressing

## 2022-04-30 NOTE — PLAN OF CARE
Problem: Potential for Falls  Goal: Patient will remain free of falls  Description: INTERVENTIONS:  - Educate patient/family on patient safety including physical limitations  - Instruct patient to call for assistance with activity   - Consult OT/PT to assist with strengthening/mobility   - Keep Call bell within reach  - Keep bed low and locked with side rails adjusted as appropriate  - Keep care items and personal belongings within reach  - Initiate and maintain comfort rounds  - Make Fall Risk Sign visible to staff  - Apply yellow socks and bracelet for high fall risk patients  - Consider moving patient to room near nurses station  Outcome: Progressing     Problem: PAIN - ADULT  Goal: Verbalizes/displays adequate comfort level or baseline comfort level  Description: Interventions:  - Encourage patient to monitor pain and request assistance  - Assess pain using appropriate pain scale  - Administer analgesics based on type and severity of pain and evaluate response  - Implement non-pharmacological measures as appropriate and evaluate response  - Consider cultural and social influences on pain and pain management  - Notify physician/advanced practitioner if interventions unsuccessful or patient reports new pain  Outcome: Progressing     Problem: INFECTION - ADULT  Goal: Absence or prevention of progression during hospitalization  Description: INTERVENTIONS:  - Assess and monitor for signs and symptoms of infection  - Monitor lab/diagnostic results  - Monitor all insertion sites, i e  indwelling lines, tubes, and drains  - Monitor endotracheal if appropriate and nasal secretions for changes in amount and color  - [place] appropriate cooling/warming therapies per order  - Administer medications as ordered  - Instruct and encourage patient and family to use good hand hygiene technique  - Identify and instruct in appropriate isolation precautions for identified infection/condition  Outcome: Progressing  Goal: Absence of fever/infection during neutropenic period  Description: INTERVENTIONS:  - Monitor WBC    Outcome: Progressing     Problem: SAFETY ADULT  Goal: Patient will remain free of falls  Description: INTERVENTIONS:  - Educate patient/family on patient safety including physical limitations  - Instruct patient to call for assistance with activity   - Consult OT/PT to assist with strengthening/mobility   - Keep Call bell within reach  - Keep bed low and locked with side rails adjusted as appropriate  - Keep care items and personal belongings within reach  - Initiate and maintain comfort rounds  - Make Fall Risk Sign visible to staff  - Apply yellow socks and bracelet for high fall risk patients  - Consider moving patient to room near nurses station  Outcome: Progressing  Goal: Maintain or return to baseline ADL function  Description: INTERVENTIONS:  -  Assess patient's ability to carry out ADLs; assess patient's baseline for ADL function and identify physical deficits which impact ability to perform ADLs (bathing, care of mouth/teeth, toileting, grooming, dressing, etc )  - Assess/evaluate cause of self-care deficits   - Assess range of motion  - Assess patient's mobility; develop plan if impaired  - Assess patient's need for assistive devices and provide as appropriate  - Encourage maximum independence but intervene and supervise when necessary  - Involve family in performance of ADLs  - Assess for home care needs following discharge   - Consider OT consult to assist with ADL evaluation and planning for discharge  - Provide patient education as appropriate  Outcome: Progressing  Goal: Maintains/Returns to pre admission functional level  Description: INTERVENTIONS:  - Perform BMAT or MOVE assessment daily    - Set and communicate daily mobility goal to care team and patient/family/caregiver     - Collaborate with rehabilitation services on mobility goals if consulted  - Out of bed for toileting  - Record patient progress and toleration of activity level   Outcome: Progressing     Problem: DISCHARGE PLANNING  Goal: Discharge to home or other facility with appropriate resources  Description: INTERVENTIONS:  - Identify barriers to discharge w/patient and caregiver  - Arrange for needed discharge resources and transportation as appropriate  - Identify discharge learning needs (meds, wound care, etc )  - Arrange for interpretive services to assist at discharge as needed  - Refer to Case Management Department for coordinating discharge planning if the patient needs post-hospital services based on physician/advanced practitioner order or complex needs related to functional status, cognitive ability, or social support system  Outcome: Progressing     Problem: Knowledge Deficit  Goal: Patient/family/caregiver demonstrates understanding of disease process, treatment plan, medications, and discharge instructions  Description: Complete learning assessment and assess knowledge base    Interventions:  - Provide teaching at level of understanding  - Provide teaching via preferred learning methods  Outcome: Progressing     Problem: MOBILITY - ADULT  Goal: Maintain or return to baseline ADL function  Description: INTERVENTIONS:  -  Assess patient's ability to carry out ADLs; assess patient's baseline for ADL function and identify physical deficits which impact ability to perform ADLs (bathing, care of mouth/teeth, toileting, grooming, dressing, etc )  - Assess/evaluate cause of self-care deficits   - Assess range of motion  - Assess patient's mobility; develop plan if impaired  - Assess patient's need for assistive devices and provide as appropriate  - Encourage maximum independence but intervene and supervise when necessary  - Involve family in performance of ADLs  - Assess for home care needs following discharge   - Consider OT consult to assist with ADL evaluation and planning for discharge  - Provide patient education as appropriate  Outcome: Progressing  Goal: Maintains/Returns to pre admission functional level  Description: INTERVENTIONS:  - Perform BMAT or MOVE assessment daily    - Set and communicate daily mobility goal to care team and patient/family/caregiver     - Collaborate with rehabilitation services on mobility goals if consulted  - Out of bed for toileting  - Record patient progress and toleration of activity level   Outcome: Progressing

## 2022-05-01 LAB
ANION GAP SERPL CALCULATED.3IONS-SCNC: 6 MMOL/L (ref 4–13)
BUN SERPL-MCNC: 34 MG/DL (ref 5–25)
CALCIUM SERPL-MCNC: 8.9 MG/DL (ref 8.3–10.1)
CHLORIDE SERPL-SCNC: 102 MMOL/L (ref 100–108)
CO2 SERPL-SCNC: 28 MMOL/L (ref 21–32)
CREAT SERPL-MCNC: 1.64 MG/DL (ref 0.6–1.3)
ERYTHROCYTE [DISTWIDTH] IN BLOOD BY AUTOMATED COUNT: 12.3 % (ref 11.6–15.1)
GFR SERPL CREATININE-BSD FRML MDRD: 45 ML/MIN/1.73SQ M
GLUCOSE SERPL-MCNC: 116 MG/DL (ref 65–140)
GLUCOSE SERPL-MCNC: 128 MG/DL (ref 65–140)
GLUCOSE SERPL-MCNC: 129 MG/DL (ref 65–140)
GLUCOSE SERPL-MCNC: 137 MG/DL (ref 65–140)
GLUCOSE SERPL-MCNC: 188 MG/DL (ref 65–140)
HCT VFR BLD AUTO: 34.3 % (ref 36.5–49.3)
HGB BLD-MCNC: 11.5 G/DL (ref 12–17)
MCH RBC QN AUTO: 31.3 PG (ref 26.8–34.3)
MCHC RBC AUTO-ENTMCNC: 33.5 G/DL (ref 31.4–37.4)
MCV RBC AUTO: 93 FL (ref 82–98)
PLATELET # BLD AUTO: 210 THOUSANDS/UL (ref 149–390)
PMV BLD AUTO: 9.9 FL (ref 8.9–12.7)
POTASSIUM SERPL-SCNC: 4.2 MMOL/L (ref 3.5–5.3)
RBC # BLD AUTO: 3.68 MILLION/UL (ref 3.88–5.62)
SODIUM SERPL-SCNC: 136 MMOL/L (ref 136–145)
WBC # BLD AUTO: 5.53 THOUSAND/UL (ref 4.31–10.16)

## 2022-05-01 PROCEDURE — 99232 SBSQ HOSP IP/OBS MODERATE 35: CPT | Performed by: INTERNAL MEDICINE

## 2022-05-01 PROCEDURE — 82948 REAGENT STRIP/BLOOD GLUCOSE: CPT

## 2022-05-01 PROCEDURE — 80048 BASIC METABOLIC PNL TOTAL CA: CPT | Performed by: INTERNAL MEDICINE

## 2022-05-01 PROCEDURE — 85027 COMPLETE CBC AUTOMATED: CPT | Performed by: INTERNAL MEDICINE

## 2022-05-01 PROCEDURE — 97116 GAIT TRAINING THERAPY: CPT

## 2022-05-01 PROCEDURE — 97530 THERAPEUTIC ACTIVITIES: CPT

## 2022-05-01 RX ORDER — LISINOPRIL 20 MG/1
20 TABLET ORAL DAILY
Status: DISCONTINUED | OUTPATIENT
Start: 2022-05-02 | End: 2022-05-02 | Stop reason: HOSPADM

## 2022-05-01 RX ORDER — HYDROCHLOROTHIAZIDE 12.5 MG/1
12.5 TABLET ORAL DAILY
Status: DISCONTINUED | OUTPATIENT
Start: 2022-05-02 | End: 2022-05-02 | Stop reason: HOSPADM

## 2022-05-01 RX ADMIN — PIPERACILLIN SODIUM AND TAZOBACTAM SODIUM 4.5 G: 4; .5 INJECTION, POWDER, LYOPHILIZED, FOR SOLUTION INTRAVENOUS at 20:29

## 2022-05-01 RX ADMIN — PREGABALIN 75 MG: 75 CAPSULE ORAL at 20:35

## 2022-05-01 RX ADMIN — LOTEPREDNOL ETABONATE: 10 SUSPENSION TOPICAL at 08:16

## 2022-05-01 RX ADMIN — GLYCERIN, HYPROMELLOSE, POLYETHYLENE GLYCOL 1 DROP: .2; .2; 1 LIQUID OPHTHALMIC at 20:50

## 2022-05-01 RX ADMIN — CARVEDILOL 25 MG: 25 TABLET, FILM COATED ORAL at 07:52

## 2022-05-01 RX ADMIN — HYDROMORPHONE HYDROCHLORIDE 0.5 MG: 1 INJECTION, SOLUTION INTRAMUSCULAR; INTRAVENOUS; SUBCUTANEOUS at 20:58

## 2022-05-01 RX ADMIN — TRAMADOL HYDROCHLORIDE 50 MG: 50 TABLET, COATED ORAL at 08:16

## 2022-05-01 RX ADMIN — ACETAMINOPHEN 325MG 650 MG: 325 TABLET ORAL at 08:15

## 2022-05-01 RX ADMIN — GLYCERIN, HYPROMELLOSE, POLYETHYLENE GLYCOL 1 DROP: .2; .2; 1 LIQUID OPHTHALMIC at 07:52

## 2022-05-01 RX ADMIN — LOTEPREDNOL ETABONATE 1 DROP: 10 SUSPENSION TOPICAL at 20:41

## 2022-05-01 RX ADMIN — PIPERACILLIN SODIUM AND TAZOBACTAM SODIUM 4.5 G: 4; .5 INJECTION, POWDER, LYOPHILIZED, FOR SOLUTION INTRAVENOUS at 02:15

## 2022-05-01 RX ADMIN — PIPERACILLIN SODIUM AND TAZOBACTAM SODIUM 4.5 G: 4; .5 INJECTION, POWDER, LYOPHILIZED, FOR SOLUTION INTRAVENOUS at 07:52

## 2022-05-01 RX ADMIN — HYDROMORPHONE HYDROCHLORIDE 0.5 MG: 1 INJECTION, SOLUTION INTRAMUSCULAR; INTRAVENOUS; SUBCUTANEOUS at 05:59

## 2022-05-01 RX ADMIN — GENTAMICIN SULFATE 1 DROP: 3 SOLUTION/ DROPS OPHTHALMIC at 20:30

## 2022-05-01 RX ADMIN — CARVEDILOL 25 MG: 25 TABLET, FILM COATED ORAL at 20:35

## 2022-05-01 RX ADMIN — PIPERACILLIN SODIUM AND TAZOBACTAM SODIUM 4.5 G: 4; .5 INJECTION, POWDER, LYOPHILIZED, FOR SOLUTION INTRAVENOUS at 15:04

## 2022-05-01 RX ADMIN — MELATONIN 6 MG: at 22:29

## 2022-05-01 RX ADMIN — GENTAMICIN SULFATE 1 DROP: 3 SOLUTION/ DROPS OPHTHALMIC at 08:00

## 2022-05-01 NOTE — ASSESSMENT & PLAN NOTE
Lab Results   Component Value Date    HGBA1C 6 6 (H) 04/26/2022       Recent Labs     04/30/22  1606 04/30/22  2047 05/01/22  0716 05/01/22  1105   POCGLU 174* 128 116 129       Blood Sugar Average: Last 72 hrs:  (P) 743 [id number]   Updated A1c 6 6%  Pt declines insulin  Will not start insulin sliding scale  He wants to remain on jardiance  Risks were explained to him  Will continue to monitor and if creatinine increases will hold

## 2022-05-01 NOTE — PHYSICAL THERAPY NOTE
Physical Therapy Treatment Session:       05/01/22 1520   PT Last Visit   PT Visit Date 05/01/22   Note Type   Note Type Treatment   Pain Assessment   Pain Assessment Tool 0-10   Pain Score 6   Pain Location/Orientation Orientation: Left; Location: Foot; Location: Leg   Hospital Pain Intervention(s) Repositioned; Ambulation/increased activity; Elevated; Rest   Restrictions/Precautions   Weight Bearing Precautions Per Order Yes   LLE Weight Bearing Per Order NWB   Braces or Orthoses Other (Comment)  (wound VAC dressing LLE)   Other Precautions Impulsive;WBS;Multiple lines; Fall Risk;Pain   General   Chart Reviewed Yes   Family/Caregiver Present Yes  (wife)   Cognition   Overall Cognitive Status Impaired  (impulsive)   Arousal/Participation Cooperative   Attention Attends with cues to redirect   Orientation Level Oriented X4   Following Commands Follows one step commands with increased time or repetition   Subjective   Subjective pt sitting at EOB upon arrival  Pt willing and agreeable to work with PT and to participate in therapy intervention  "I just want to get the heck out of here  Show me the exit"  Bed Mobility   Supine to Sit Unable to assess  (pt sitting at EOB pre/post activity)   Transfers   Sit to Stand 5  Supervision   Additional items Assist x 1;Bedrails; Impulsive;Verbal cues   Stand to Sit 5  Supervision   Additional items Assist x 1;Bedrails; Impulsive;Verbal cues  (VC for control descent)   Ambulation/Elevation   Gait pattern Antalgic;Narrow GLORIA; Forward Flexion; Inconsistent freda; Foward flexed; Short stride; Ataxia  (pt able to maintain NWB % of the time)   Gait Assistance 4  Minimal assist   Additional items Assist x 1;Verbal cues  (2* pts impulsivity)   Assistive Device Rolling walker   Distance 20 feet x2 with use of RW on various surfaces  Pt requested to use and practice with kneeler walker   Pt able to ambulate 100 feet with use of kneeler RW needing min CG level of A for instruction for use 2* being a new DME for mobility  Pt currently declining wc option for mobility at this time despite recomendation   Stair Management Assistance 4  Minimal assist   Additional items Assist x 1;Verbal cues  (pt prefers to go up and down on buttocks)   Stair Management Technique Other (Comment)  (up and down on buttocks with use of HR)   Number of Stairs 8  (pt reports having 7 MIS and 1 FF to 2nd floor)   Balance   Static Sitting Good   Dynamic Sitting Fair   Static Standing Fair -   Dynamic Standing Poor +   Ambulatory Poor +   Endurance Deficit   Endurance Deficit Yes   Endurance Deficit Description pain,fatigues quickly with observed SOB   Activity Tolerance   Activity Tolerance Patient limited by fatigue;Patient limited by pain   Medical Staff Made Aware nsg   Nurse Made Aware yes   Assessment   Prognosis Fair   Problem List Decreased strength;Decreased endurance; Impaired balance;Decreased mobility; Impaired judgement;Decreased safety awareness; Obesity; Decreased skin integrity;Pain  (NWB LLE)   Assessment Pt able to perform sit to stand transfers to and from various usrfaces needing S level of A  Dependent for wound VAC dressing management and line management during upright mobility  Pt able to ambulate 20 feet x2 with use of RW to stair well to practice 8 steps going up and down on buttocks area and use of HR needing minAx1 for verbal instruction  Pt is very impulsive and requires min verbal instruction to slow down and maintain NWB LLE during mobility  Pt requested to practice use of kneeler walker for gait  Pt able to ambulate an additional 100 feet with use of kneeler walker needing min Ax1 for verbal instruction to slow down and steering during turns and navigate small and narrow areas  Pt reports minimal pain LLE at rest and during mobility  Pt would cont to benefit from skilled inpt PT services to maximize functional independence and to A to dec caregiver burden     Barriers to Discharge Inaccessible home environment  (MIS and 1 FF of steps to 2nd floor)   Goals   Patient Goals to get out of the hospital   STG Expiration Date 05/09/22   Plan   Treatment/Interventions Functional transfer training;LE strengthening/ROM; Elevations; Therapeutic exercise; Endurance training;Patient/family training;Equipment eval/education; Bed mobility;Gait training;Spoke to nursing;Family   Progress Progressing toward goals   PT Frequency 3-5x/wk   Recommendation   PT Discharge Recommendation Home with home health rehabilitation   Equipment Recommended Knee Scooter* ($)   Walker Package Recommended   (kneeler walker,pt declining wc at this time)   Additional Comments 2   (pt currently declining wc at this time and aluminum wallker  )   AM-PAC Basic Mobility Inpatient   Turning in Bed Without Bedrails 4   Lying on Back to Sitting on Edge of Flat Bed 4   Moving Bed to Chair 3   Standing Up From Chair 3   Walk in Room 3   Climb 3-5 Stairs 3   Basic Mobility Inpatient Raw Score 20   Basic Mobility Standardized Score 43 99   Highest Level Of Mobility   JH-HLM Goal 6: Walk 10 steps or more   JH-HLM Highest Level of Mobility 7: Walk 25 feet or more   JH-HLM Goal Achieved Yes

## 2022-05-01 NOTE — PLAN OF CARE
Problem: PHYSICAL THERAPY ADULT  Goal: Performs mobility at highest level of function for planned discharge setting  See evaluation for individualized goals  Description: Treatment/Interventions: Functional transfer training,LE strengthening/ROM,Elevations,Therapeutic exercise,Patient/family training,Equipment eval/education,Bed mobility,Gait training,Compensatory technique education,Continued evaluation,Spoke to nursing,OT          See flowsheet documentation for full assessment, interventions and recommendations  Note: Prognosis: Fair  Problem List: Decreased strength,Decreased endurance,Impaired balance,Decreased mobility,Impaired judgement,Decreased safety awareness,Obesity,Decreased skin integrity,Pain (NWB LLE)  Assessment: Pt able to perform sit to stand transfers to and from various usrfaces needing S level of A  Dependent for wound VAC dressing management and line management during upright mobility  Pt able to ambulate 20 feet x2 with use of RW to stair well to practice 8 steps going up and down on buttocks area and use of HR needing minAx1 for verbal instruction  Pt is very impulsive and requires min verbal instruction to slow down and maintain NWB LLE during mobility  Pt requested to practice use of kneeler walker for gait  Pt able to ambulate an additional 100 feet with use of kneeler walker needing min Ax1 for verbal instruction to slow down and steering during turns and navigate small and narrow areas  Pt reports minimal pain LLE at rest and during mobility  Pt would cont to benefit from skilled inpt PT services to maximize functional independence and to A to dec caregiver burden  Barriers to Discharge: Inaccessible home environment (MIS and 1 FF of steps to 2nd floor)  Barriers to Discharge Comments: steps     PT Discharge Recommendation: Home with home health rehabilitation          See flowsheet documentation for full assessment

## 2022-05-01 NOTE — CASE MANAGEMENT
Case Management Discharge Planning Note    Patient name Pricila Loredo  Location [facility] 68 2 /South 2 Fel[address] Distance* MRN [id number]  : 1965 Date 2022       Current Admission Date: 2022  Current Admission Diagnosis:Diabetic foot ulcer ([facility] )   Patient Active Problem List    Diagnosis Date Noted    H/O eye surgery 2022    Diabetic foot ulcer ([facility] ) 2022    Dog bite of arm, right, sequela 2022    Cellulitis 2022    [facility], [facility] 2022    Erectile dysfunction 2021    Neuropathy 2021    Low back pain with sciatica 2021    Neck pain 2021    Lumbar radiculopathy 2021    Cervical radiculopathy 2021    Myofascial pain 2021    Class 2 severe obesity with serious comorbidity and body mass index (BMI) of 37 0 to 37 9 in adult [facility]) 2021    Stage 2 chronic kidney disease 2021    Essential hypertension     Diabetes mellitus ([facility] )       LOS (days): 6  Geometric Mean LOS (GMLOS) (days): 5 30  Days to GMLOS:-0 9     OBJECTIVE:  Risk of Unplanned Readmission Score: 24         Current admission status: Inpatient   Preferred Pharmacy:   [address] [place] [address]  Phone: [phone] Fax: [phone]    Primary Care Provider: Valente Gonzalez MD    Primary Insurance: MEDICARE  Secondary Insurance: BLUE CROSS    DISCHARGE DETAILS:    Discharge planning discussed with[de-identified] Patient and spouse  Freedom of Choice: Yes  Comments - Freedom of Choice: Pt agreeable to VNA services - Explained that [facility] did not have availability - agreeable to additional referrals - [facility] able to accept  CM contacted family/caregiver?: Yes (Family member present in room)             Contacts  Patient Contacts: Art Quick  Relationship to Patient[de-identified] Family  Contact Method:  In Person  Reason/Outcome: Discharge [address]         Is the patient interested in [address] at discharge?: Yes  [address] requested[de-identified] Rui Lilian Name[de-identified] Other ([address] [facility])  [facility] Provider[de-identified] Other (comment) (Dr Patel Marks)  [address] Needed[de-identified] Evaluate Functional Status and Safety,Gait/ADL Training,Strengthening/Theraputic Exercises to Improve Function,Wound/Ostomy Care  Homebound Criteria Met[de-identified] Uses an Assist Device (i e  cane, walker, etc),Requires the Assistance of Another Person for Safe Ambulation or to Leave the Home  Supporting Clincal Findings[de-identified] Limited Endurance         Other Referral/Resources/Interventions Provided:  Interventions: HHC,DME         Treatment Team Recommendation: Home with [address]  Discharge Destination Plan[de-identified] Home with [place] at Discharge : Auto with designated ,Family                       Accompanied by: Family member              Additional Comments: CM met with pt and spouse to let them know that [facility] able to accept for SN and PT services in the home  Pt agreeable  Pt asked about getting a knee scooter as the walker was not helpful for him when working with PT, plus he reports having narrow hallways and the walker will be too bulky  [facility] does not have the option to order a knee scooter  CM to check with pt's local pharmacy, [facility], to see if they offer knee scooters

## 2022-05-01 NOTE — ASSESSMENT & PLAN NOTE
Continue Coreg  Holding HCTZ and lisinopril with low-normal blood pressure-restart tomorrow   Monitor vital signs and avoid hypotension

## 2022-05-01 NOTE — PROGRESS NOTES
Podiatry - Progress Note  Patient: Rahul Betts 59 y o  male   MRN: [id number]  PCP: Lucina Banks MD  Unit/Bed#: [facility] [address] [place] [address] [id number] Encounter: [id number]  Date Of Visit: 22    ASSESSMENT:    Rahul Betts is a 59 y o  male with:    1  Left foot ulceration        -s/p left foot washout/wound debridement DOS: 22  2  Left 3rd interdigital space abrasion- healed  3  L foot cellulitis  4  T2DM  5  CKD 2  6  Lumbar radiculopathy        PLAN:    · Wound vac was changed  Pt is stable for discharge per podiatry view point pt to f/u with Podiatry out pt next week  · Continue local wound care consisting of wound vac  · Intra op culture: Pseudomonas  Recommend PO antibiotics on d/c for 5 days  · Elevation and offloading on green foam wedges or pillows when non-ambulatory  · Rest of care per primary team      Weightbearing status: NWB    Wound VAC application  1  Number of sponges: 2  2  Pressure settin continuous  3  Size of wound: 3 5x3 5x0 6cm       SUBJECTIVE:     The patient was seen, evaluated, and assessed at bedside today  The patient was awake, alert, and in no acute distress  No acute events overnight  The patient reports mild pain with wound vac changes  Pt denies calf pain  Pt reports that over all he is feeling well  Patient denies N/V/F/chills/SOB/CP  OBJECTIVE:     Vitals:   /90   Pulse 82   Temp 98 1 °F (36 7 °C)   Resp 18   Ht 5' 11" (1 803 m)   Wt 123 kg (271 lb 9 7 oz)   SpO2 96%   BMI 37 88 kg/m²     Temp (24hrs), Av 3 °F (36 8 °C), Min:98 1 °F (36 7 °C), Max:98 4 °F (36 9 °C)      Physical Exam:     General: Alert, cooperative and no distress  Lungs: Non labored breathing  Abdomen: Soft, non-tender  Lower extremity exam:  Cardiovascular status at baseline  Neurological status at baseline  Musculoskeletal status at baseline  No calf tenderness noted      Lower extremity wound(s) as noted below:  Wound #: 1  Location: left lateral foot  Length 3 5cm: Width 3 5cm: Depth 0 6 cm:   Deepest Tissue Noted in Base: periosteum  Probe to Bone: periosteum  Peripheral Skin Description: Attached  Granulation: 90% Fibrotic Tissue: 10% Necrotic Tissue: 0%   Drainage Amount: minimal, serosanguinous  Signs of Infection: No    Clinical Images 05/01/22: Additional Data:     Labs:    Results from last 7 days   Lab Units 05/01/22  0517 04/29/22  0535 04/28/22  0335   WBC Thousand/uL 5 53   < > 4 32   HEMOGLOBIN g/dL 11 5*   < > 12 6   HEMATOCRIT % 34 3*   < > 37 3   PLATELETS Thousands/uL 210   < > 191   NEUTROS PCT %  --   --  50   LYMPHS PCT %  --   --  31   MONOS PCT %  --   --  12   EOS PCT %  --   --  6    < > = values in this interval not displayed  Results from last 7 days   Lab Units 05/01/22  0517   POTASSIUM mmol/L 4 2   CHLORIDE mmol/L 102   CO2 mmol/L 28   BUN mg/dL 34*   CREATININE mg/dL 1 64*   CALCIUM mg/dL 8 9           * I Have Reviewed All Lab Data Listed Above  Recent Cultures (last 7 days):     Results from last 7 days   Lab Units 04/28/22  1351 04/25/22  1128 04/25/22  0939   BLOOD CULTURE   --  No Growth After 5 Days  No Growth After 5 Days  GRAM STAIN RESULT  No Polys or Bacteria seen  --   --      Results from last 7 days   Lab Units 04/28/22  1347   ANAEROBIC CULTURE  No anaerobes isolated       Imaging: I have personally reviewed pertinent films in PACS  EKG, Pathology, and Other Studies: I have personally reviewed pertinent reports  ** Please Note: Portions of the record may have been created with voice recognition software  Occasional wrong word or "sound a like" substitutions may have occurred due to the inherent limitations of voice recognition software  Read the chart carefully and recognize, using context, where substitutions have occurred   **

## 2022-05-01 NOTE — PLAN OF CARE
Problem: Potential for Falls  Goal: Patient will remain free of falls  Description: INTERVENTIONS:  - Educate patient/family on patient safety including physical limitations  - Instruct patient to call for assistance with activity   - Consult OT/PT to assist with strengthening/mobility   - Keep Call bell within reach  - Keep bed low and locked with side rails adjusted as appropriate  - Keep care items and personal belongings within reach  - Initiate and maintain comfort rounds  - Make Fall Risk Sign visible to staff  - Apply yellow socks and bracelet for high fall risk patients  - Consider moving patient to room near nurses station  Outcome: Progressing     Problem: PAIN - ADULT  Goal: Verbalizes/displays adequate comfort level or baseline comfort level  Description: Interventions:  - Encourage patient to monitor pain and request assistance  - Assess pain using appropriate pain scale  - Administer analgesics based on type and severity of pain and evaluate response  - Implement non-pharmacological measures as appropriate and evaluate response  - Consider cultural and social influences on pain and pain management  - Notify physician/advanced practitioner if interventions unsuccessful or patient reports new pain  Outcome: Progressing     Problem: INFECTION - ADULT  Goal: Absence or prevention of progression during hospitalization  Description: INTERVENTIONS:  - Assess and monitor for signs and symptoms of infection  - Monitor lab/diagnostic results  - Monitor all insertion sites, i e  indwelling lines, tubes, and drains  - Monitor endotracheal if appropriate and nasal secretions for changes in amount and color  - [place] appropriate cooling/warming therapies per order  - Administer medications as ordered  - Instruct and encourage patient and family to use good hand hygiene technique  - Identify and instruct in appropriate isolation precautions for identified infection/condition  Outcome: Progressing  Goal: Absence of fever/infection during neutropenic period  Description: INTERVENTIONS:  - Monitor WBC    Outcome: Progressing     Problem: SAFETY ADULT  Goal: Patient will remain free of falls  Description: INTERVENTIONS:  - Educate patient/family on patient safety including physical limitations  - Instruct patient to call for assistance with activity   - Consult OT/PT to assist with strengthening/mobility   - Keep Call bell within reach  - Keep bed low and locked with side rails adjusted as appropriate  - Keep care items and personal belongings within reach  - Initiate and maintain comfort rounds  - Make Fall Risk Sign visible to staff  - Apply yellow socks and bracelet for high fall risk patients  - Consider moving patient to room near nurses station  Outcome: Progressing  Goal: Maintain or return to baseline ADL function  Description: INTERVENTIONS:  -  Assess patient's ability to carry out ADLs; assess patient's baseline for ADL function and identify physical deficits which impact ability to perform ADLs (bathing, care of mouth/teeth, toileting, grooming, dressing, etc )  - Assess/evaluate cause of self-care deficits   - Assess range of motion  - Assess patient's mobility; develop plan if impaired  - Assess patient's need for assistive devices and provide as appropriate  - Encourage maximum independence but intervene and supervise when necessary  - Involve family in performance of ADLs  - Assess for home care needs following discharge   - Consider OT consult to assist with ADL evaluation and planning for discharge  - Provide patient education as appropriate  Outcome: Progressing  Goal: Maintains/Returns to pre admission functional level  Description: INTERVENTIONS:  - Perform BMAT or MOVE assessment daily    - Set and communicate daily mobility goal to care team and patient/family/caregiver     - Collaborate with rehabilitation services on mobility goals if consulted  - Out of bed for toileting  - Record patient progress and toleration of activity level   Outcome: Progressing     Problem: DISCHARGE PLANNING  Goal: Discharge to home or other facility with appropriate resources  Description: INTERVENTIONS:  - Identify barriers to discharge w/patient and caregiver  - Arrange for needed discharge resources and transportation as appropriate  - Identify discharge learning needs (meds, wound care, etc )  - Arrange for interpretive services to assist at discharge as needed  - Refer to Case Management Department for coordinating discharge planning if the patient needs post-hospital services based on physician/advanced practitioner order or complex needs related to functional status, cognitive ability, or social support system  Outcome: Progressing     Problem: Knowledge Deficit  Goal: Patient/family/caregiver demonstrates understanding of disease process, treatment plan, medications, and discharge instructions  Description: Complete learning assessment and assess knowledge base    Interventions:  - Provide teaching at level of understanding  - Provide teaching via preferred learning methods  Outcome: Progressing     Problem: MOBILITY - ADULT  Goal: Maintain or return to baseline ADL function  Description: INTERVENTIONS:  -  Assess patient's ability to carry out ADLs; assess patient's baseline for ADL function and identify physical deficits which impact ability to perform ADLs (bathing, care of mouth/teeth, toileting, grooming, dressing, etc )  - Assess/evaluate cause of self-care deficits   - Assess range of motion  - Assess patient's mobility; develop plan if impaired  - Assess patient's need for assistive devices and provide as appropriate  - Encourage maximum independence but intervene and supervise when necessary  - Involve family in performance of ADLs  - Assess for home care needs following discharge   - Consider OT consult to assist with ADL evaluation and planning for discharge  - Provide patient education as appropriate  Outcome: Progressing  Goal: Maintains/Returns to pre admission functional level  Description: INTERVENTIONS:  - Perform BMAT or MOVE assessment daily    - Set and communicate daily mobility goal to care team and patient/family/caregiver     - Collaborate with rehabilitation services on mobility goals if consulted  - Out of bed for toileting  - Record patient progress and toleration of activity level   Outcome: Progressing

## 2022-05-01 NOTE — ASSESSMENT & PLAN NOTE
Lab Results   Component Value Date    EGFR 45 05/01/2022    EGFR 47 04/30/2022    EGFR 45 04/29/2022    CREATININE 1 64 (H) 05/01/2022    CREATININE 1 58 (H) 04/30/2022    CREATININE 1 62 (H) 04/29/2022   baseline creatinine is 1 4-1 6  Monitor renal function daily  Holding HCTZ lisinopril will restart

## 2022-05-01 NOTE — PROGRESS NOTES
[address] [place] [address]  Progress Note Brunilda Ruiz 1965, 59 y o  male MRN: [id number]  Unit/Bed#: [facility] 68 2 [facility] [id number] Encounter: [id number]  Primary Care Provider: Moe Singh MD   Date and time admitted to hospital: 4/25/2022  9:16 AM    * Diabetic foot ulcer ([address] )  Assessment & Plan  Diabetic left foot ulcer with purulent drainage and surrounding cellulitis  MRI left foot: small plantar abscess base of 5th toe, findings of osteitis, no specific OM, no fracture   Appreciate podiatry recommendations  S/p left foot washout: wound vac placed this am and will be obtained for home  weight bearing status: nonweight baring to LLE with darco wedge   Appreciate Infectious Disease consult recommendations, had been treated with Unasyn and oral doxycycline  Cultures show few pseudomonas  Changed to zosynPt will be changed to levaquin 750mg until 5/7/22  Blood cultures negative x 72 hours  Monitor fever curve, currently hemodynamically stable and nontoxic    H/O eye surgery  Assessment & Plan  Patient with recent surgery to his right eye  Ophthalmologist was contacted  Pt continued eye drops and eye has improved  Chronic kidney disease, stage 3 [facility])  Assessment & Plan  Lab Results   Component Value Date    EGFR 45 05/01/2022    EGFR 47 04/30/2022    EGFR 45 04/29/2022    CREATININE 1 64 (H) 05/01/2022    CREATININE 1 58 (H) 04/30/2022    CREATININE 1 62 (H) 04/29/2022   baseline creatinine is 1 4-1 6  Monitor renal function daily  Holding HCTZ lisinopril will restart       Class 2 severe obesity with serious comorbidity and body mass index (BMI) of 37 0 to 37 9 in adult [facility])  Assessment & Plan  Encourage diet and weight loss  BMI 37 8 noted    Diabetes mellitus [facility])  Assessment & Plan  Lab Results   Component Value Date    HGBA1C 6 6 (H) 04/26/2022       Recent Labs     04/30/22  1606 04/30/22  2047 05/01/22  0716 05/01/22  1105   POCGLU 174* 128 116 129       Blood Sugar Average: Last 72 hrs:  (P) 241 [id number]   Updated A1c 6 6%  Pt declines insulin  Will not start insulin sliding scale  He wants to remain on jardiance  Risks were explained to him  Will continue to monitor and if creatinine increases will hold  Essential hypertension  Assessment & Plan  Continue Coreg  Holding HCTZ and lisinopril with low-normal blood pressure-restart tomorrow   Monitor vital signs and avoid hypotension        VTE Pharmacologic Prophylaxis: pt declined heparin     Time Spent for Care: 20 minutes  More than 50% of total time spent on counseling and coordination of care as described above  Current Length of Stay: 6 day(s)  Current Patient Status: Inpatient     Discharge Plan / Estimated Discharge Date: awaiting wound vac set up along with clearance from podiatry and ID  Possibly d/c tomorrow     Code Status: Level 1 - Full Code      Subjective:   Pt seen and examined  Pt doing well  No f/c no cp no sob no n/v/d no abd pain  Objective:     Vitals:   Temp (24hrs), Av 3 °F (36 8 °C), Min:98 2 °F (36 8 °C), Max:98 4 °F (36 9 °C)    Temp:  [98 2 °F (36 8 °C)-98 4 °F (36 9 °C)] 98 4 °F (36 9 °C)  HR:  [80-85] 81  BP: (140-145)/(89-91) 140/89  SpO2:  [94 %-95 %] 95 %  Body mass index is 37 88 kg/m²  Input and Output Summary (last 24 hours):     No intake or output data in the 24 hours ending 22 1438    Physical Exam:   Physical Exam  Constitutional:       Appearance: Normal appearance  HENT:      Head: Normocephalic and atraumatic  Eyes:      Extraocular Movements: Extraocular movements intact  Pupils: Pupils are equal, round, and reactive to light  Cardiovascular:      Rate and Rhythm: Normal rate and regular rhythm  Heart sounds: No murmur heard  No friction rub  No gallop  Pulmonary:      Effort: Pulmonary effort is normal  No respiratory distress  Breath sounds: Normal breath sounds  No wheezing or rales     Abdominal:      General: Bowel sounds are normal  There is no distension  Palpations: Abdomen is soft  Tenderness: There is no abdominal tenderness  There is no guarding  Musculoskeletal:      Right lower leg: No edema  Left lower leg: No edema  Neurological:      Mental Status: He is alert and oriented to person, place, and time  Additional Data:     Labs:  Results from last 7 days   Lab Units 05/01/22  0517 04/29/22  0535 04/28/22  0335   WBC Thousand/uL 5 53   < > 4 32   HEMOGLOBIN g/dL 11 5*   < > 12 6   HEMATOCRIT % 34 3*   < > 37 3   PLATELETS Thousands/uL 210   < > 191   NEUTROS PCT %  --   --  50   LYMPHS PCT %  --   --  31   MONOS PCT %  --   --  12   EOS PCT %  --   --  6    < > = values in this interval not displayed  Results from last 7 days   Lab Units 05/01/22  0517   SODIUM mmol/L 136   POTASSIUM mmol/L 4 2   CHLORIDE mmol/L 102   CO2 mmol/L 28   BUN mg/dL 34*   CREATININE mg/dL 1 64*   ANION GAP mmol/L 6   CALCIUM mg/dL 8 9   GLUCOSE RANDOM mg/dL 128         Results from last 7 days   Lab Units 05/01/22  1105 05/01/22  0716 04/30/22  2047 04/30/22  1606 04/30/22  1042 04/30/22  0814 04/29/22  2055 04/29/22  1623 04/29/22  1141 04/29/22  0755 04/28/22  2101 04/28/22  1547   POC GLUCOSE mg/dl 129 116 128 174* 156* 118 159* 121 123 139 168* 144*     Results from last 7 days   Lab Units 04/26/22  0415   HEMOGLOBIN A1C % 6 6*           Recent Cultures (last 7 days):     Results from last 7 days   Lab Units 04/28/22  1351 04/25/22  1128 04/25/22  0939   BLOOD CULTURE   --  No Growth After 5 Days  No Growth After 5 Days     GRAM STAIN RESULT  No Polys or Bacteria seen  --   --        Lines/Drains:  Invasive Devices  Report    Peripheral Intravenous Line            Peripheral IV 04/29/22 Left;Ventral (anterior) Forearm 2 days    Peripheral IV 04/29/22 Right;Ventral (anterior) Forearm 1 day              Last 24 Hours Medication List:   Current Facility-Administered Medications   Medication Dose Route Frequency Provider Last Rate  acetaminophen  650 mg Oral Q6H PRN Quinn Hobbs, DPM      carvedilol  25 mg Oral BID With Meals Quinn Hobbs, DPPEREZ      gentamicin  1 drop Right Eye BID Quinn Hobbs, DPM      glycerin-hypromellose-  1 drop Both Eyes Q6H PRN Quinn Hobbs, DPM      [START ON 5/2/2022] hydrochlorothiazide  12 5 mg Oral Daily Sanjuanita Ambron, DO      HYDROmorphone  0 5 mg Intravenous Q4H PRN Quinn Hobbs, DPM      [START ON 5/2/2022] lisinopril  20 mg Oral Daily Sanjuanita Ambron, DO      Loteprednol Etabonate   Right Eye BID Quinn Hobbs, DPM      melatonin  6 mg Oral HS Quinn Hobbs, DPM      ondansetron  4 mg Intravenous Q6H PRN Quinn Hobbs, DPM      piperacillin-tazobactam  4 5 g Intravenous Q6H Pau Curtis MD 4 5 g (05/01/22 0752)    pregabalin  75 mg Oral Daily Quinn Hobbs, DPM      traMADol  50 mg Oral Q6H PRN Quinn Hobbs, CARLOS MANUELM          Today, Patient Was Seen By: Lory Grove DO

## 2022-05-02 VITALS
OXYGEN SATURATION: 97 % | RESPIRATION RATE: 18 BRPM | HEIGHT: 71 IN | HEART RATE: 76 BPM | TEMPERATURE: 98.2 F | DIASTOLIC BLOOD PRESSURE: 96 MMHG | WEIGHT: 271.61 LBS | SYSTOLIC BLOOD PRESSURE: 150 MMHG | BODY MASS INDEX: 38.02 KG/M2

## 2022-05-02 LAB
ANION GAP SERPL CALCULATED.3IONS-SCNC: 8 MMOL/L (ref 4–13)
BUN SERPL-MCNC: 34 MG/DL (ref 5–25)
CALCIUM SERPL-MCNC: 8.9 MG/DL (ref 8.3–10.1)
CHLORIDE SERPL-SCNC: 100 MMOL/L (ref 100–108)
CO2 SERPL-SCNC: 27 MMOL/L (ref 21–32)
CREAT SERPL-MCNC: 1.77 MG/DL (ref 0.6–1.3)
DME PARACHUTE DELIVERY DATE REQUESTED: NORMAL
DME PARACHUTE ITEM DESCRIPTION: NORMAL
DME PARACHUTE ORDER STATUS: NORMAL
DME PARACHUTE SUPPLIER NAME: NORMAL
DME PARACHUTE SUPPLIER PHONE: NORMAL
ERYTHROCYTE [DISTWIDTH] IN BLOOD BY AUTOMATED COUNT: 12.5 % (ref 11.6–15.1)
GFR SERPL CREATININE-BSD FRML MDRD: 41 ML/MIN/1.73SQ M
GLUCOSE SERPL-MCNC: 118 MG/DL (ref 65–140)
GLUCOSE SERPL-MCNC: 126 MG/DL (ref 65–140)
GLUCOSE SERPL-MCNC: 130 MG/DL (ref 65–140)
GLUCOSE SERPL-MCNC: 165 MG/DL (ref 65–140)
HCT VFR BLD AUTO: 35.2 % (ref 36.5–49.3)
HGB BLD-MCNC: 11.6 G/DL (ref 12–17)
MCH RBC QN AUTO: 30.9 PG (ref 26.8–34.3)
MCHC RBC AUTO-ENTMCNC: 33 G/DL (ref 31.4–37.4)
MCV RBC AUTO: 94 FL (ref 82–98)
PLATELET # BLD AUTO: 232 THOUSANDS/UL (ref 149–390)
PMV BLD AUTO: 10.1 FL (ref 8.9–12.7)
POTASSIUM SERPL-SCNC: 4 MMOL/L (ref 3.5–5.3)
RBC # BLD AUTO: 3.76 MILLION/UL (ref 3.88–5.62)
SODIUM SERPL-SCNC: 135 MMOL/L (ref 136–145)
WBC # BLD AUTO: 6.16 THOUSAND/UL (ref 4.31–10.16)

## 2022-05-02 PROCEDURE — 85027 COMPLETE CBC AUTOMATED: CPT | Performed by: INTERNAL MEDICINE

## 2022-05-02 PROCEDURE — 82948 REAGENT STRIP/BLOOD GLUCOSE: CPT

## 2022-05-02 PROCEDURE — 99231 SBSQ HOSP IP/OBS SF/LOW 25: CPT | Performed by: STUDENT IN AN ORGANIZED HEALTH CARE EDUCATION/TRAINING PROGRAM

## 2022-05-02 PROCEDURE — 99239 HOSP IP/OBS DSCHRG MGMT >30: CPT | Performed by: PHYSICIAN ASSISTANT

## 2022-05-02 PROCEDURE — 80048 BASIC METABOLIC PNL TOTAL CA: CPT | Performed by: INTERNAL MEDICINE

## 2022-05-02 PROCEDURE — 2W0TX6Z CHANGE PRESSURE DRESSING ON LEFT FOOT: ICD-10-PCS | Performed by: PODIATRIST

## 2022-05-02 RX ORDER — LEVOFLOXACIN 750 MG/1
750 TABLET ORAL EVERY 24 HOURS
Qty: 6 TABLET | Refills: 0 | Status: SHIPPED | OUTPATIENT
Start: 2022-05-02 | End: 2022-05-08

## 2022-05-02 RX ADMIN — LOTEPREDNOL ETABONATE: 10 SUSPENSION TOPICAL at 08:39

## 2022-05-02 RX ADMIN — PIPERACILLIN SODIUM AND TAZOBACTAM SODIUM 4.5 G: 4; .5 INJECTION, POWDER, LYOPHILIZED, FOR SOLUTION INTRAVENOUS at 02:25

## 2022-05-02 RX ADMIN — CARVEDILOL 25 MG: 25 TABLET, FILM COATED ORAL at 08:12

## 2022-05-02 RX ADMIN — HYDROMORPHONE HYDROCHLORIDE 0.5 MG: 1 INJECTION, SOLUTION INTRAMUSCULAR; INTRAVENOUS; SUBCUTANEOUS at 03:40

## 2022-05-02 RX ADMIN — PIPERACILLIN SODIUM AND TAZOBACTAM SODIUM 4.5 G: 4; .5 INJECTION, POWDER, LYOPHILIZED, FOR SOLUTION INTRAVENOUS at 15:48

## 2022-05-02 RX ADMIN — LISINOPRIL 20 MG: 20 TABLET ORAL at 08:12

## 2022-05-02 RX ADMIN — GENTAMICIN SULFATE 1 DROP: 3 SOLUTION/ DROPS OPHTHALMIC at 08:12

## 2022-05-02 RX ADMIN — TRAMADOL HYDROCHLORIDE 50 MG: 50 TABLET, COATED ORAL at 16:44

## 2022-05-02 RX ADMIN — HYDROMORPHONE HYDROCHLORIDE 0.5 MG: 1 INJECTION, SOLUTION INTRAMUSCULAR; INTRAVENOUS; SUBCUTANEOUS at 08:44

## 2022-05-02 RX ADMIN — HYDROCHLOROTHIAZIDE 12.5 MG: 12.5 TABLET ORAL at 08:12

## 2022-05-02 RX ADMIN — PIPERACILLIN SODIUM AND TAZOBACTAM SODIUM 4.5 G: 4; .5 INJECTION, POWDER, LYOPHILIZED, FOR SOLUTION INTRAVENOUS at 08:12

## 2022-05-02 NOTE — PLAN OF CARE
Problem: Potential for Falls  Goal: Patient will remain free of falls  Description: INTERVENTIONS:  - Educate patient/family on patient safety including physical limitations  - Instruct patient to call for assistance with activity   - Consult OT/PT to assist with strengthening/mobility   - Keep Call bell within reach  - Keep bed low and locked with side rails adjusted as appropriate  - Keep care items and personal belongings within reach  - Initiate and maintain comfort rounds  - Apply yellow socks and bracelet for high fall risk patients  - Consider moving patient to room near nurses station  Outcome: Progressing     Problem: PAIN - ADULT  Goal: Verbalizes/displays adequate comfort level or baseline comfort level  Description: Interventions:  - Encourage patient to monitor pain and request assistance  - Assess pain using appropriate pain scale  - Administer analgesics based on type and severity of pain and evaluate response  - Implement non-pharmacological measures as appropriate and evaluate response  - Consider cultural and social influences on pain and pain management  - Notify physician/advanced practitioner if interventions unsuccessful or patient reports new pain  Outcome: Progressing     Problem: INFECTION - ADULT  Goal: Absence or prevention of progression during hospitalization  Description: INTERVENTIONS:  - Assess and monitor for signs and symptoms of infection  - Monitor lab/diagnostic results  - Monitor all insertion sites, i e  indwelling lines, tubes, and drains  - Monitor endotracheal if appropriate and nasal secretions for changes in amount and color  - [place] appropriate cooling/warming therapies per order  - Administer medications as ordered  - Instruct and encourage patient and family to use good hand hygiene technique  - Identify and instruct in appropriate isolation precautions for identified infection/condition  Outcome: Progressing     Problem: SAFETY ADULT  Goal: Patient will remain free of falls  Description: INTERVENTIONS:  - Educate patient/family on patient safety including physical limitations  - Instruct patient to call for assistance with activity   - Consult OT/PT to assist with strengthening/mobility   - Keep Call bell within reach  - Keep bed low and locked with side rails adjusted as appropriate  - Keep care items and personal belongings within reach  - Initiate and maintain comfort rounds  - Apply yellow socks and bracelet for high fall risk patients  - Consider moving patient to room near nurses station  Outcome: Progressing  Goal: Maintain or return to baseline ADL function  Description: INTERVENTIONS:  - Educate patient/family on patient safety including physical limitations  - Instruct patient to call for assistance with activity   - Consult OT/PT to assist with strengthening/mobility   - Keep Call bell within reach  - Keep bed low and locked with side rails adjusted as appropriate  - Keep care items and personal belongings within reach  - Initiate and maintain comfort rounds  - Apply yellow socks and bracelet for high fall risk patients  - Consider moving patient to room near nurses station  Outcome: Progressing  Goal: Maintains/Returns to pre admission functional level  Description: INTERVENTIONS:  - Perform BMAT or MOVE assessment daily    - Set and communicate daily mobility goal to care team and patient/family/caregiver     - Collaborate with rehabilitation services on mobility goals if consulted  - Out of bed for toileting  - Record patient progress and toleration of activity level   Outcome: Progressing     Problem: DISCHARGE PLANNING  Goal: Discharge to home or other facility with appropriate resources  Description: INTERVENTIONS:  - Identify barriers to discharge w/patient and caregiver  - Arrange for needed discharge resources and transportation as appropriate  - Identify discharge learning needs (meds, wound care, etc )  - Arrange for interpretive services to assist at discharge as needed  - Refer to Case Management Department for coordinating discharge planning if the patient needs post-hospital services based on physician/advanced practitioner order or complex needs related to functional status, cognitive ability, or social support system  Outcome: Progressing     Problem: Knowledge Deficit  Goal: Patient/family/caregiver demonstrates understanding of disease process, treatment plan, medications, and discharge instructions  Description: Complete learning assessment and assess knowledge base  Interventions:  - Provide teaching at level of understanding  - Provide teaching via preferred learning methods  Outcome: Progressing     Problem: MOBILITY - ADULT  Goal: Maintain or return to baseline ADL function  Description: INTERVENTIONS:  - Educate patient/family on patient safety including physical limitations  - Instruct patient to call for assistance with activity   - Consult OT/PT to assist with strengthening/mobility   - Keep Call bell within reach  - Keep bed low and locked with side rails adjusted as appropriate  - Keep care items and personal belongings within reach  - Initiate and maintain comfort rounds  - Apply yellow socks and bracelet for high fall risk patients  - Consider moving patient to room near nurses station  Outcome: Progressing  Goal: Maintains/Returns to pre admission functional level  Description: INTERVENTIONS:  - Perform BMAT or MOVE assessment daily    - Set and communicate daily mobility goal to care team and patient/family/caregiver     - Collaborate with rehabilitation services on mobility goals if consulted  - Ambulate patient 3 times a day  - Out of bed to chair 3 times a day   - Out of bed for meals 3 times a day  - Out of bed for toileting  - Record patient progress and toleration of activity level   Outcome: Progressing

## 2022-05-02 NOTE — DISCHARGE INSTRUCTIONS
Discharge Instructions - Podiatry    Weight Bearing Status: Non-weight bearing left lower extremity                   Follow-up appointment instructions: Please make an appointment within one week of discharge with Dr Blum Sport  Contact sooner if any increase in pain, or signs of infection occur  Make an appointment for next week  Wound Care: Leave dressings clean, dry, and intact between professional dressing changes    Nursing Instructions: Please apply Wound Vac Dresing applied at 125mmHg  Please protect the skin edges with skin prep  Is maceration noted to skin borders please apply betadine paint  Then cover with Gauze and secure with Kerlix and tape  Please change dressing every Monday, Wednesday, and Friday

## 2022-05-02 NOTE — ASSESSMENT & PLAN NOTE
Lab Results   Component Value Date    HGBA1C 6 6 (H) 04/26/2022       Recent Labs     05/01/22  1105 05/01/22  1600 05/01/22  2102 05/02/22  0736   POCGLU 129 188* 137 118       Blood Sugar Average: Last 72 hrs:  (P) 892 [id number]   Updated A1c 6 6%  Pt declines insulin   Continue home medications of ozempic and jardiance   follow up with Endo outpatient

## 2022-05-02 NOTE — DISCHARGE SUMMARY
[facility]  Discharge- Eugenio Lockwood 1965, 59 y o  male MRN: [id number]  Unit/Bed#: [address] [place] [address] 224-01 Encounter: [id number]  Primary Care Provider: Migdalia Sidhu MD   Date and time admitted to hospital: 4/25/2022  9:16 AM    * Diabetic foot ulcer ([address] )  Assessment & Plan  Diabetic left foot ulcer with purulent drainage and surrounding cellulitis  MRI left foot: small plantar abscess base of 5th toe, findings of osteitis, no specific OM, no fracture   Appreciate podiatry recommendations  Spoke with podiatry this AM, okay for discharge, vac change this AM   S/p left foot washout: wound vac placed   weight bearing status: nonweight baring to LLE with darco wedge   Appreciate Infectious Disease consult recommendations, had been treated with Unasyn and oral doxycycline  Cultures show few pseudomonas  Changed to zosyn, Pt will be continue abx with levaquin 750mg until 5/7/22  Blood cultures negative to date  Follow up with podiatry outpatient     H/O eye surgery  Assessment & Plan  Patient with recent surgery to his right eye  Ophthalmologist was contacted  Pt continued eye drops and eye has improved  Outpatient follow up     Chronic kidney disease, stage 3 [facility])  Assessment & Plan  Lab Results   Component Value Date    EGFR 41 05/02/2022    EGFR 45 05/01/2022    EGFR 47 04/30/2022    CREATININE 1 77 (H) 05/02/2022    CREATININE 1 64 (H) 05/01/2022    CREATININE 1 58 (H) 04/30/2022   baseline creatinine is 1 4-1 6     BMP in 4 weeks     Class 2 severe obesity with serious comorbidity and body mass index (BMI) of 37 0 to 37 9 in adult [facility])  Assessment & Plan  Encourage diet and weight loss  BMI 37 8 noted    Diabetes mellitus [facility])  Assessment & Plan  Lab Results   Component Value Date    HGBA1C 6 6 (H) 04/26/2022       Recent Labs     05/01/22  1105 05/01/22  1600 05/01/22  2102 05/02/22  0736   POCGLU 129 188* 137 118       Blood Sugar Average: Last 72 hrs:  (P) 331 [id number] Updated A1c 6 6%  Pt declines insulin  Continue home medications of ozempic and jardiance   follow up with Endo outpatient     Essential hypertension  Assessment & Plan  Continue home medications         Medical Problems             Resolved Problems  Date Reviewed: 5/2/2022    None              Discharging Physician / Practitioner: Stanford Sanabria PA-C  PCP: Krista Zepeda MD  Admission Date:   Admission Orders (From admission, onward)     Ordered        04/25/22 1119  INPATIENT ADMISSION  Once                      Discharge Date: 05/02/22    Consultations During Hospital Stay:  · Podiatry   · infectious disease    Procedures Performed:   · Status post left foot washout out and wound vac placement 4/28    Significant Findings / Test Results:   · MRI left foot:  FINDINGS:     Motion significantly degrades images      SUBCUTANEOUS TISSUES:   Edema, greatest dorsal foot      Approximately 1 7 x 1 0 0, 0 8 0 8 cm (length X depth X width) plantar fluid collection at the lateral foot, plantar to the 5th MTP joint  No evidence of sinus tract      BONES:    Marrow edema in the 5th metatarsal diaphysis and distal meta-epiphysis and at least the proximal phalanx of the 5th toe  Preserved fatty marrow signal   No destructive or erosive change      Small 5th MTP joint effusion      Preserved alignment      Small hallux sesamoid effusion and mild degenerative osseous changes  Hallux sesamoid complex otherwise within normal limits      PLANTAR FASCIA:  Intact      LISFRANC LIGAMENT:  Intact      FOREFOOT TENDONS: Intact flexor and extensor tendons  No tenosynovitis      INTERMETATARSAL REGIONS: No definite Myles's neuroma  Mild 3rd-4th web space fluid      MUSCULATURE: Diffuse muscle fatty atrophy compatible with diabetes  Superimposed edema  No focal findings      OTHER:   No evidence of plantar plate tear    Visualized sinus Tarsi is within normal limits      IMPRESSION:     Small plantar abscess base of 5th toe      Findings of osteitis and probably superimposed stress response 5th toe  No specific findings of osteomyelitis  No fracture  · X-ray left foot:    FINDINGS:     There is no acute fracture or dislocation      Degenerative changes first MTP joint  Calcaneal spurs     No lytic or blastic osseous lesion      Vascular calcifications     IMPRESSION:     No acute osseous abnormality      Degenerative changes  Heel spurs     No evidence of osteomyelitis    Incidental Findings:   · none    Test Results Pending at Discharge (will require follow up): · BMP in 4 weeks     Outpatient Tests Requested:  · PCP within 1 week for post hospital follow up  · Podiatry   · Endocrinology    Complications:      Reason for Admission: diabetic foot ulcer     Hospital Course:   Claus Valadez is a 59 y o  male patient who originally presented to the hospital on 4/25/2022 due to known left foot wound with worsening erythema, pain and purulent drainage  Patient was admitted insert on IV antibiotics for diabetic left foot ulcer  Patient was seen in consultation with Podiatry  MRI left revealed small abscess base of 5th toe, no specific findings osteomyelitis and no fracture  Patient underwent washout in the operating room on 04/28/2022 with wound VAC placed to the left foot  Patient will continue nonweightbearing status on left lower extremity with outpatient follow-up with Podiatry  Patient was seen in consultation with Infectious Disease for antibiotic guidance  His culture from 04/28 grew Pseudomonas  Patient will be discharged Levaquin 750 once daily through 5/7/22 to complete a 7 day course of antibiotic  Discharge with home health services  Please see above list of diagnoses and related plan for additional information  Condition at Discharge: stable    Discharge Day Visit / Exam:   Subjective:  Doing well  No complaints  No CP or SOB  No N/V/D  No pain       Vitals: Blood Pressure: 127/71 (05/02/22 1759)  Pulse: 69 (05/02/22 0733)  Temperature: (!) 97 2 °F (36 2 °C) (05/02/22 0732)  Temp Source: Oral (04/28/22 1443)  Respirations: 18 (04/28/22 1443)  Height: 5' 11" (180 3 cm) (04/25/22 0915)  Weight - Scale: 123 kg (271 lb 9 7 oz) (04/25/22 0915)  SpO2: 94 % (05/02/22 0733)  Exam:   Physical Exam  Vitals and nursing note reviewed  Constitutional:       General: He is not in acute distress  Appearance: He is not ill-appearing or toxic-appearing  HENT:      Head: Normocephalic  Eyes:      Conjunctiva/sclera: Conjunctivae normal    Cardiovascular:      Rate and Rhythm: Normal rate and regular rhythm  Pulmonary:      Effort: Pulmonary effort is normal       Breath sounds: Normal breath sounds  Abdominal:      General: Bowel sounds are normal       Palpations: Abdomen is soft  Musculoskeletal:         General: Deformity (left foot with vac present ) present  Skin:     Comments: dresing over left foot   Neurological:      Mental Status: He is alert  Mental status is at baseline  Psychiatric:         Mood and Affect: Mood normal           Discharge instructions/Information to patient and family:   See after visit summary for information provided to patient and family  Provisions for Follow-Up Care:  See after visit summary for information related to follow-up care and any pertinent home health orders  Disposition:   Home with [facility] (Reminder: Complete face to face encounter)    Planned Readmission: high risk for readmission      Discharge Statement:  I spent 40 minutes discharging the patient  This time was spent on the day of discharge  I had direct contact with the patient on the day of discharge  Greater than 50% of the total time was spent examining patient, answering all patient questions, arranging and discussing plan of care with patient as well as directly providing post-discharge instructions  Additional time then spent on discharge activities      Discharge Medications:  See after visit summary for reconciled discharge medications provided to patient and/or family        **Please Note: This note may have been constructed using a voice recognition system**

## 2022-05-02 NOTE — CASE MANAGEMENT
Case Management Discharge Planning Note    Patient name Tobi Lewis  Location [facility] /[facility] Agustin Pham* MRN [id number]  : 1965 Date 2022       Current Admission Date: 2022  Current Admission Diagnosis:Diabetic foot ulcer ([facility] )   Patient Active Problem List    Diagnosis Date Noted    H/O eye surgery 2022    [facility] ) 2022    Dog bite of arm, right, sequela 2022    Cellulitis 2022    Chronic kidney disease, stage 3 ([place] [address] ) 2022    Erectile dysfunction 2021    Neuropathy 2021    Low back pain with sciatica 2021    Neck pain 2021    Lumbar radiculopathy 2021    Cervical radiculopathy 2021    Myofascial pain 2021    Class 2 severe obesity with serious comorbidity and body mass index (BMI) of 37 0 to 37 9 in adult [facility]) 2021    Stage 2 chronic kidney disease 2021    Essential hypertension     Diabetes mellitus ([place] [address] )       LOS (days): 7  Geometric Mean LOS (GMLOS) (days): 5 30  Days to GMLOS:-1 7     OBJECTIVE:  Risk of Unplanned Readmission Score: 24         Current admission status: Inpatient   Preferred Pharmacy:   [address] [place] [address]  Phone: [phone] Fax: [phone]    Primary Care Provider: Juan Dunne MD    Primary Insurance: MEDICARE  Secondary Insurance: BLUE CROSS    DISCHARGE DETAILS:      Other Referral/Resources/Interventions Provided:  Interventions: Wound Vac  Referral Comments: Delivered [employer] wound vac to room- RN to review application and alarm status'         Treatment Team Recommendation: Home with  [facility]  Discharge Destination Plan[de-identified] Home with  [facility] ([address] [place] [address])  Transport at Discharge : Auto with designated ,Family        Transported by [employer] and Unit #): family     IMM Given (Date):: 22  IMM Given to[de-identified] Patient

## 2022-05-02 NOTE — PLAN OF CARE
Problem: Potential for Falls  Goal: Patient will remain free of falls  Description: INTERVENTIONS:  - Educate patient/family on patient safety including physical limitations  - Instruct patient to call for assistance with activity   - Consult OT/PT to assist with strengthening/mobility   - Keep Call bell within reach  - Keep bed low and locked with side rails adjusted as appropriate  - Keep care items and personal belongings within reach  - Initiate and maintain comfort rounds  - Make Fall Risk Sign visible to staff  - Offer Toileting every  Hours, in advance of need  - Initiate/Maintain alarm  - Obtain necessary fall risk management equipment:   - Apply yellow socks and bracelet for high fall risk patients  - Consider moving patient to room near nurses station  Outcome: Progressing     Problem: PAIN - ADULT  Goal: Verbalizes/displays adequate comfort level or baseline comfort level  Description: Interventions:  - Encourage patient to monitor pain and request assistance  - Assess pain using appropriate pain scale  - Administer analgesics based on type and severity of pain and evaluate response  - Implement non-pharmacological measures as appropriate and evaluate response  - Consider cultural and social influences on pain and pain management  - Notify physician/advanced practitioner if interventions unsuccessful or patient reports new pain  Outcome: Progressing     Problem: INFECTION - ADULT  Goal: Absence or prevention of progression during hospitalization  Description: INTERVENTIONS:  - Assess and monitor for signs and symptoms of infection  - Monitor lab/diagnostic results  - Monitor all insertion sites, i e  indwelling lines, tubes, and drains  - Monitor endotracheal if appropriate and nasal secretions for changes in amount and color  - [place] appropriate cooling/warming therapies per order  - Administer medications as ordered  - Instruct and encourage patient and family to use good hand hygiene technique  - Identify and instruct in appropriate isolation precautions for identified infection/condition  Outcome: Progressing  Goal: Absence of fever/infection during neutropenic period  Description: INTERVENTIONS:  - Monitor WBC    Outcome: Progressing     Problem: SAFETY ADULT  Goal: Patient will remain free of falls  Description: INTERVENTIONS:  - Educate patient/family on patient safety including physical limitations  - Instruct patient to call for assistance with activity   - Consult OT/PT to assist with strengthening/mobility   - Keep Call bell within reach  - Keep bed low and locked with side rails adjusted as appropriate  - Keep care items and personal belongings within reach  - Initiate and maintain comfort rounds  - Make Fall Risk Sign visible to staff  - Offer Toileting every  Hours, in advance of need  - Initiate/Maintain alarm  - Obtain necessary fall risk management equipment:   - Apply yellow socks and bracelet for high fall risk patients  - Consider moving patient to room near nurses station  Outcome: Progressing  Goal: Maintain or return to baseline ADL function  Description: INTERVENTIONS:  -  Assess patient's ability to carry out ADLs; assess patient's baseline for ADL function and identify physical deficits which impact ability to perform ADLs (bathing, care of mouth/teeth, toileting, grooming, dressing, etc )  - Assess/evaluate cause of self-care deficits   - Assess range of motion  - Assess patient's mobility; develop plan if impaired  - Assess patient's need for assistive devices and provide as appropriate  - Encourage maximum independence but intervene and supervise when necessary  - Involve family in performance of ADLs  - Assess for home care needs following discharge   - Consider OT consult to assist with ADL evaluation and planning for discharge  - Provide patient education as appropriate  Outcome: Progressing  Goal: Maintains/Returns to pre admission functional level  Description: INTERVENTIONS:  - Perform BMAT or MOVE assessment daily    - Set and communicate daily mobility goal to care team and patient/family/caregiver  - Collaborate with rehabilitation services on mobility goals if consulted  - Perform Range of Motion times a day  - Reposition patient every  hours  - Dangle patient  times a day  - Stand patient  times a day  - Ambulate patient  times a day  - Out of bed to chair  times a day   - Out of bed for meals  times a day  - Out of bed for toileting  - Record patient progress and toleration of activity level   Outcome: Progressing     Problem: DISCHARGE PLANNING  Goal: Discharge to home or other facility with appropriate resources  Description: INTERVENTIONS:  - Identify barriers to discharge w/patient and caregiver  - Arrange for needed discharge resources and transportation as appropriate  - Identify discharge learning needs (meds, wound care, etc )  - Arrange for interpretive services to assist at discharge as needed  - Refer to Case Management Department for coordinating discharge planning if the patient needs post-hospital services based on physician/advanced practitioner order or complex needs related to functional status, cognitive ability, or social support system  Outcome: Progressing     Problem: Knowledge Deficit  Goal: Patient/family/caregiver demonstrates understanding of disease process, treatment plan, medications, and discharge instructions  Description: Complete learning assessment and assess knowledge base    Interventions:  - Provide teaching at level of understanding  - Provide teaching via preferred learning methods  Outcome: Progressing     Problem: MOBILITY - ADULT  Goal: Maintain or return to baseline ADL function  Description: INTERVENTIONS:  -  Assess patient's ability to carry out ADLs; assess patient's baseline for ADL function and identify physical deficits which impact ability to perform ADLs (bathing, care of mouth/teeth, toileting, grooming, dressing, etc )  - Assess/evaluate cause of self-care deficits   - Assess range of motion  - Assess patient's mobility; develop plan if impaired  - Assess patient's need for assistive devices and provide as appropriate  - Encourage maximum independence but intervene and supervise when necessary  - Involve family in performance of ADLs  - Assess for home care needs following discharge   - Consider OT consult to assist with ADL evaluation and planning for discharge  - Provide patient education as appropriate  Outcome: Progressing  Goal: Maintains/Returns to pre admission functional level  Description: INTERVENTIONS:  - Perform BMAT or MOVE assessment daily    - Set and communicate daily mobility goal to care team and patient/family/caregiver  - Collaborate with rehabilitation services on mobility goals if consulted  - Perform Range of Motion times a day  - Reposition patient every  hours    - Dangle patient  times a day  - Stand patient times a day  - Ambulate patient  times a day  - Out of bed to chair  times a day   - Out of bed for meals times a day  - Out of bed for toileting  - Record patient progress and toleration of activity level   Outcome: Progressing

## 2022-05-02 NOTE — PLAN OF CARE
Problem: Potential for Falls  Goal: Patient will remain free of falls  Description: INTERVENTIONS:  - Educate patient/family on patient safety including physical limitations  - Instruct patient to call for assistance with activity   - Consult OT/PT to assist with strengthening/mobility   - Keep Call bell within reach  - Keep bed low and locked with side rails adjusted as appropriate  - Keep care items and personal belongings within reach  - Initiate and maintain comfort rounds  - Apply yellow socks and bracelet for high fall risk patients  - Consider moving patient to room near nurses station  5/2/2022 1115 by Kamron Agee RN  Outcome: Adequate for Discharge  5/2/2022 1114 by Kamron Agee RN  Outcome: Progressing  5/2/2022 1055 by Kamron Agee RN  Outcome: Progressing     Problem: PAIN - ADULT  Goal: Verbalizes/displays adequate comfort level or baseline comfort level  Description: Interventions:  - Encourage patient to monitor pain and request assistance  - Assess pain using appropriate pain scale  - Administer analgesics based on type and severity of pain and evaluate response  - Implement non-pharmacological measures as appropriate and evaluate response  - Consider cultural and social influences on pain and pain management  - Notify physician/advanced practitioner if interventions unsuccessful or patient reports new pain  5/2/2022 1115 by Kamron Agee RN  Outcome: Adequate for Discharge  5/2/2022 1114 by Kamron Agee RN  Outcome: Progressing  5/2/2022 1055 by Kamron Agee RN  Outcome: Progressing     Problem: INFECTION - ADULT  Goal: Absence or prevention of progression during hospitalization  Description: INTERVENTIONS:  - Assess and monitor for signs and symptoms of infection  - Monitor lab/diagnostic results  - Monitor all insertion sites, i e  indwelling lines, tubes, and drains  - Monitor endotracheal if appropriate and nasal secretions for changes in amount and color  - [place] appropriate cooling/warming therapies per order  - Administer medications as ordered  - Instruct and encourage patient and family to use good hand hygiene technique  - Identify and instruct in appropriate isolation precautions for identified infection/condition  5/2/2022 1115 by Indira Fisher RN  Outcome: Adequate for Discharge  5/2/2022 1114 by Indira Fisher RN  Outcome: Progressing  5/2/2022 1055 by Indira Fisher RN  Outcome: Progressing     Problem: SAFETY ADULT  Goal: Patient will remain free of falls  Description: INTERVENTIONS:  - Educate patient/family on patient safety including physical limitations  - Instruct patient to call for assistance with activity   - Consult OT/PT to assist with strengthening/mobility   - Keep Call bell within reach  - Keep bed low and locked with side rails adjusted as appropriate  - Keep care items and personal belongings within reach  - Initiate and maintain comfort rounds  - Apply yellow socks and bracelet for high fall risk patients  - Consider moving patient to room near nurses station  5/2/2022 1115 by Indira Fisher RN  Outcome: Adequate for Discharge  5/2/2022 1114 by Indira Fisher RN  Outcome: Progressing  5/2/2022 1055 by Indira Fisher RN  Outcome: Progressing  Goal: Maintain or return to baseline ADL function  Description: INTERVENTIONS:  - Educate patient/family on patient safety including physical limitations  - Instruct patient to call for assistance with activity   - Consult OT/PT to assist with strengthening/mobility   - Keep Call bell within reach  - Keep bed low and locked with side rails adjusted as appropriate  - Keep care items and personal belongings within reach  - Initiate and maintain comfort rounds  - Apply yellow socks and bracelet for high fall risk patients  - Consider moving patient to room near nurses station  5/2/2022 1115 by Indira Fisher RN  Outcome: Adequate for Discharge  5/2/2022 1114 by Indira Fisher RN  Outcome: Progressing  5/2/2022 1055 by Indira Fisher RN  Outcome: Progressing  Goal: Maintains/Returns to pre admission functional level  Description: INTERVENTIONS:  - Perform BMAT or MOVE assessment daily    - Set and communicate daily mobility goal to care team and patient/family/caregiver  - Collaborate with rehabilitation services on mobility goals if consulted  - Out of bed for toileting  - Record patient progress and toleration of activity level   5/2/2022 1115 by Rosi Espinal RN  Outcome: Adequate for Discharge  5/2/2022 1114 by Rosi Espinal RN  Outcome: Progressing  5/2/2022 1055 by Rosi Espinal RN  Outcome: Progressing     Problem: DISCHARGE PLANNING  Goal: Discharge to home or other facility with appropriate resources  Description: INTERVENTIONS:  - Identify barriers to discharge w/patient and caregiver  - Arrange for needed discharge resources and transportation as appropriate  - Identify discharge learning needs (meds, wound care, etc )  - Arrange for interpretive services to assist at discharge as needed  - Refer to Case Management Department for coordinating discharge planning if the patient needs post-hospital services based on physician/advanced practitioner order or complex needs related to functional status, cognitive ability, or social support system  5/2/2022 1115 by Rosi Espinal RN  Outcome: Adequate for Discharge  5/2/2022 1114 by Rosi Espinal RN  Outcome: Progressing  5/2/2022 1055 by Rosi Espinal RN  Outcome: Progressing     Problem: Knowledge Deficit  Goal: Patient/family/caregiver demonstrates understanding of disease process, treatment plan, medications, and discharge instructions  Description: Complete learning assessment and assess knowledge base    Interventions:  - Provide teaching at level of understanding  - Provide teaching via preferred learning methods  5/2/2022 1115 by Rosi Espinal RN  Outcome: Adequate for Discharge  5/2/2022 1114 by Rosi Espinal RN  Outcome: Progressing  5/2/2022 1055 by Rosi Espinal RN  Outcome: Progressing     Problem: MOBILITY - ADULT  Goal: Maintain or return to baseline ADL function  Description: INTERVENTIONS:  - Educate patient/family on patient safety including physical limitations  - Instruct patient to call for assistance with activity   - Consult OT/PT to assist with strengthening/mobility   - Keep Call bell within reach  - Keep bed low and locked with side rails adjusted as appropriate  - Keep care items and personal belongings within reach  - Initiate and maintain comfort rounds  - Apply yellow socks and bracelet for high fall risk patients  - Consider moving patient to room near nurses station  5/2/2022 1115 by Faye Pollack RN  Outcome: Adequate for Discharge  5/2/2022 1114 by Faye Pollack RN  Outcome: Progressing  5/2/2022 1055 by Faye Pollack RN  Outcome: Progressing  Goal: Maintains/Returns to pre admission functional level  Description: INTERVENTIONS:  - Perform BMAT or MOVE assessment daily    - Set and communicate daily mobility goal to care team and patient/family/caregiver     - Collaborate with rehabilitation services on mobility goals if consulted  - Out of bed for toileting  - Record patient progress and toleration of activity level   5/2/2022 1115 by Faye Pollack RN  Outcome: Adequate for Discharge  5/2/2022 1114 by Faye Pollack RN  Outcome: Progressing  5/2/2022 1055 by Faye Pollack RN  Outcome: Progressing

## 2022-05-02 NOTE — NURSING NOTE
Patient noted with bloody drainage to left lateral foot and near heel  Wound vac in place/on/machine does not show any seal leak  Pt refused to allow nurse to evaluate and stated he would only let podiatry look at the wound/vac site  This nurse messaged [facility] Podiatry Resident on call, [place], at [employer]  Reply received at 1400, podiatry resident not on campus at present  Pt made aware and this nurse attempted to assess the site and pt is adamant that no one femi look at or touch the site besides podiatry  This nurse explained to the patient that I would need to assess to see why there is blood on ace wrap and assess the site incase of complication  Pt refused, offered to have wound nurse eval and pt again refused  Pt again stated " I don't care if I have to stay all night or until tomorrow, I want podiatry to see it, I do not want any nurses here to look at this"  Patient is aware podiatry will not be in the hospital until after completing surgery at another campus

## 2022-05-02 NOTE — CASE MANAGEMENT
Case Management Progress Note    Patient name Jenna Ramires  [facility] Conor Gurrola* MRN [id number]  : 1965 Date 2022       LOS (days): 7  Geometric Mean LOS (GMLOS) (days): 5 30  Days to GMLOS:-1 9        OBJECTIVE:        Current admission status: Inpatient  Preferred Pharmacy:   [address] [place] [address]  Phone: [phone] Fax: [phone]    Primary Care Provider: Toney Blanchard MD    Primary Insurance: MEDICARE  Secondary Insurance: BLUE CROSS    PROGRESS NOTE:  [employer] Wound Vac delivered to pt's room with education provided- forms returns to [facility]

## 2022-05-02 NOTE — ASSESSMENT & PLAN NOTE
Patient with recent surgery to his right eye  Ophthalmologist was contacted  Pt continued eye drops and eye has improved     Outpatient follow up

## 2022-05-02 NOTE — ASSESSMENT & PLAN NOTE
Lab Results   Component Value Date    EGFR 41 05/02/2022    EGFR 45 05/01/2022    EGFR 47 04/30/2022    CREATININE 1 77 (H) 05/02/2022    CREATININE 1 64 (H) 05/01/2022    CREATININE 1 58 (H) 04/30/2022   baseline creatinine is 1 4-1 6     BMP in 4 weeks

## 2022-05-02 NOTE — PLAN OF CARE
Problem: Potential for Falls  Goal: Patient will remain free of falls  Description: INTERVENTIONS:  - Educate patient/family on patient safety including physical limitations  - Instruct patient to call for assistance with activity   - Consult OT/PT to assist with strengthening/mobility   - Keep Call bell within reach  - Keep bed low and locked with side rails adjusted as appropriate  - Keep care items and personal belongings within reach  - Initiate and maintain comfort rounds  - Apply yellow socks and bracelet for high fall risk patients  - Consider moving patient to room near nurses station  5/2/2022 1114 by Rosi Espinal RN  Outcome: Progressing  5/2/2022 1055 by Rosi Espinal RN  Outcome: Progressing     Problem: PAIN - ADULT  Goal: Verbalizes/displays adequate comfort level or baseline comfort level  Description: Interventions:  - Encourage patient to monitor pain and request assistance  - Assess pain using appropriate pain scale  - Administer analgesics based on type and severity of pain and evaluate response  - Implement non-pharmacological measures as appropriate and evaluate response  - Consider cultural and social influences on pain and pain management  - Notify physician/advanced practitioner if interventions unsuccessful or patient reports new pain  5/2/2022 1114 by Rosi Espinal RN  Outcome: Progressing  5/2/2022 1055 by Rosi Espinal RN  Outcome: Progressing     Problem: INFECTION - ADULT  Goal: Absence or prevention of progression during hospitalization  Description: INTERVENTIONS:  - Assess and monitor for signs and symptoms of infection  - Monitor lab/diagnostic results  - Monitor all insertion sites, i e  indwelling lines, tubes, and drains  - Monitor endotracheal if appropriate and nasal secretions for changes in amount and color  - [place] appropriate cooling/warming therapies per order  - Administer medications as ordered  - Instruct and encourage patient and family to use good hand hygiene technique  - Identify and instruct in appropriate isolation precautions for identified infection/condition  5/2/2022 1114 by Charleen Vigil RN  Outcome: Progressing  5/2/2022 1055 by Charleen Vigil RN  Outcome: Progressing     Problem: SAFETY ADULT  Goal: Patient will remain free of falls  Description: INTERVENTIONS:  - Educate patient/family on patient safety including physical limitations  - Instruct patient to call for assistance with activity   - Consult OT/PT to assist with strengthening/mobility   - Keep Call bell within reach  - Keep bed low and locked with side rails adjusted as appropriate  - Keep care items and personal belongings within reach  - Initiate and maintain comfort rounds  - Apply yellow socks and bracelet for high fall risk patients  - Consider moving patient to room near nurses station  5/2/2022 1114 by Charleen Vigil RN  Outcome: Progressing  5/2/2022 1055 by Charleen Vigil RN  Outcome: Progressing  Goal: Maintain or return to baseline ADL function  Description: INTERVENTIONS:  - Educate patient/family on patient safety including physical limitations  - Instruct patient to call for assistance with activity   - Consult OT/PT to assist with strengthening/mobility   - Keep Call bell within reach  - Keep bed low and locked with side rails adjusted as appropriate  - Keep care items and personal belongings within reach  - Initiate and maintain comfort rounds  - Apply yellow socks and bracelet for high fall risk patients  - Consider moving patient to room near nurses station  5/2/2022 1114 by Charleen Vigil RN  Outcome: Progressing  5/2/2022 1055 by Charleen Vigil RN  Outcome: Progressing  Goal: Maintains/Returns to pre admission functional level  Description: INTERVENTIONS:  - Perform BMAT or MOVE assessment daily    - Set and communicate daily mobility goal to care team and patient/family/caregiver     - Collaborate with rehabilitation services on mobility goals if consulted  - Out of bed for toileting  - Record patient progress and toleration of activity level   5/2/2022 1114 by Virgil Shannon RN  Outcome: Progressing  5/2/2022 1055 by Virgil Shannon RN  Outcome: Progressing     Problem: DISCHARGE PLANNING  Goal: Discharge to home or other facility with appropriate resources  Description: INTERVENTIONS:  - Identify barriers to discharge w/patient and caregiver  - Arrange for needed discharge resources and transportation as appropriate  - Identify discharge learning needs (meds, wound care, etc )  - Arrange for interpretive services to assist at discharge as needed  - Refer to Case Management Department for coordinating discharge planning if the patient needs post-hospital services based on physician/advanced practitioner order or complex needs related to functional status, cognitive ability, or social support system  5/2/2022 1114 by Virgil Shannon RN  Outcome: Progressing  5/2/2022 1055 by Virgil Shannon RN  Outcome: Progressing     Problem: Knowledge Deficit  Goal: Patient/family/caregiver demonstrates understanding of disease process, treatment plan, medications, and discharge instructions  Description: Complete learning assessment and assess knowledge base    Interventions:  - Provide teaching at level of understanding  - Provide teaching via preferred learning methods  5/2/2022 1114 by Virgil Shannon RN  Outcome: Progressing  5/2/2022 1055 by Virgil Shannon RN  Outcome: Progressing     Problem: MOBILITY - ADULT  Goal: Maintain or return to baseline ADL function  Description: INTERVENTIONS:  - Educate patient/family on patient safety including physical limitations  - Instruct patient to call for assistance with activity   - Consult OT/PT to assist with strengthening/mobility   - Keep Call bell within reach  - Keep bed low and locked with side rails adjusted as appropriate  - Keep care items and personal belongings within reach  - Initiate and maintain comfort rounds  - Apply yellow socks and bracelet for high fall risk patients  - Consider moving patient to room near nurses station  5/2/2022 1114 by Shalonda Parker RN  Outcome: Progressing  5/2/2022 1055 by Shalonda Parker RN  Outcome: Progressing  Goal: Maintains/Returns to pre admission functional level  Description: INTERVENTIONS:  - Perform BMAT or MOVE assessment daily    - Set and communicate daily mobility goal to care team and patient/family/caregiver     - Out of bed for toileting  - Record patient progress and toleration of activity level   5/2/2022 1114 by Shalonda Parker RN  Outcome: Progressing  5/2/2022 1055 by Shalonda Parker RN  Outcome: Progressing

## 2022-05-02 NOTE — PROGRESS NOTES
Progress Note - Infectious Disease   Horacio Betts 59 y o  male MRN: [id number]  Unit/Bed#: [facility] 87 224-01 Encounter: [id number]      Impression/Plan:    1  Infected left diabetic foot ulcer-persistent despite outpatient oral antibiotics  Outpatient wound culture grew an E coli and mixed skin mendez  Patient hemodynamically stable  Blood cultures no growth  His antibiotics are limited because he has had a severe rash with cephalosporins, however he tolerates penicillins without difficulty  MRI of the foot shows a fluid collection at the plantar 5th MTPJ without definitive signs of osteomyelitis  Status post OR debridement 4/28/22 of necrotic wound  No purulence or sinus tracts seen, underlying tissue and bone appeared healthy  Wound vac now placed  OR culture is now groing few colonies Pseudomonas aeruginosa  The patient has been clinically improving despite lack of antibiotic coverage for this so I suspect source control with surgery was the main issue  Wound vac changed today, wound is healing well   -continue Zosyn 4 5g IV q6hr  -at discharge, recommend transition to PO levofloxacin 750mg daily to complete a 7 day total course from antibiotic switch, through 5/7/22  -okay for discharge from ID perspective when wound vac delivered and cleared by Podiatry  -local wound care  -close podiatry follow-up     2  Diabetes mellitus-type 2  Without long-term insulin use    3  Chronic kidney disease stage 3  Creatinine elevated on admission, now improving   -dose adjusted antibiotics as needed  -volume management     4  Obesity-with a body mass index of greater than 37  Risk factor for complicating infection and relapse   -stress weight loss through diet and exercise    5  Antibiotic allergies  Severe rash with cephalosporins, however he tolerates penicillins without difficulty  Tolerated zosyn and unasyn  Allergy list updated  Discussed the above management plan with the primary service   Okay for discharge from ID perspective  Antibiotics:  Zosyn day 3    Subjective:  Patient reports some pain with wound vac change this morning  When I saw him this afternoon, he had significant bleeding from the vac site  No fever, chills  Otherwise feeling well, tolerating antibiotics  Objective:  Vitals:  Temp:  [97 2 °F (36 2 °C)-98 5 °F (36 9 °C)] 97 2 °F (36 2 °C)  HR:  [69-87] 82  BP: (127-139)/(71-91) 127/71  SpO2:  [94 %-96 %] 96 %  Temp (24hrs), Av 9 °F (36 6 °C), Min:97 2 °F (36 2 °C), Max:98 5 °F (36 9 °C)  Current: Temperature: (!) 97 2 °F (36 2 °C)    Physical Exam:   General Appearance:  Alert, interactive, nontoxic, no acute distress  Throat: Oropharynx moist without lesions  Lungs:   Clear to auscultation bilaterally; no wheezes, rhonchi or rales; respirations unlabored   Heart:  RRR; no murmur, rub or gallop   Abdomen:   Soft, non-tender, non-distended, positive bowel sounds  Extremities: No clubbing, cyanosis  Left foot dressed with a dry dressing in place  Reviewed new images in epic with notable erythema on the dorsum of the foot and necrotic wound  Skin: No new rashes or lesions  No draining wounds noted  Labs, Imaging, & Other studies:   All pertinent labs and imaging studies were personally reviewed  Results from last 7 days   Lab Units 22  0450 22  0545   WBC Thousand/uL 6 16 5 53 4 89   HEMOGLOBIN g/dL 11 6* 11 5* 11 6*   PLATELETS Thousands/uL 232 210 198     Results from last 7 days   Lab Units 22  0450 22  0517 22  0517 22  0545 22  0545   SODIUM mmol/L 135*  --  136  --  136   POTASSIUM mmol/L 4 0   < > 4 2   < > 4 2   CHLORIDE mmol/L 100   < > 102   < > 102   CO2 mmol/L 27   < > 28   < > 27   BUN mg/dL 34*   < > 34*   < > 34*   CREATININE mg/dL 1 77*   < > 1 64*   < > 1 58*   EGFR ml/min/1 73sq m 41   < > 45   < > 47   CALCIUM mg/dL 8 9   < > 8 9   < > 9 1    < > = values in this interval not displayed       Results from last 7 days   Lab Units 04/28/22  1351   GRAM STAIN RESULT  No Polys or Bacteria seen     Images in PACS personally reviewed  MRI left foot:  Small plantar abscess base of 5th toe       Findings of osteitis and probably superimposed stress response 5th toe   No specific findings of osteomyelitis   No fracture

## 2022-05-02 NOTE — QUICK NOTE
Treatment Update:    Podiatry service paged to evaluate saturated dressings at left foot  Wound vac was obstructed by underlying hematoma  Dressings were removed and new negative wound vac dressing applied and connected to home vac  See details below:    Wound VAC application  1  Number of sponges: Removed 2 black/ Applied 2 black  2  Pressure settin continuous  3  Size of wound: 3 5 cm x 3 5 cm x 0 6 cm  4  Description of wound: red granular, mild bleeding    Patient stable for discharge  Remain NWB LLE

## 2022-05-02 NOTE — ASSESSMENT & PLAN NOTE
Diabetic left foot ulcer with purulent drainage and surrounding cellulitis  MRI left foot: small plantar abscess base of 5th toe, findings of osteitis, no specific OM, no fracture   Appreciate podiatry recommendations  Spoke with podiatry this AM, okay for discharge, vac change this AM   S/p left foot washout: wound vac placed   weight bearing status: nonweight baring to LLE with darco wedge   Appreciate Infectious Disease consult recommendations, had been treated with Unasyn and oral doxycycline  Cultures show few pseudomonas   Changed to zosyn, Pt will be continue abx with levaquin 750mg until 5/7/22  Blood cultures negative to date  Follow up with podiatry outpatient

## 2022-05-02 NOTE — NURSING NOTE
Patient discharged to home with all belongings and portable wound vac in place  Pt was instructed in general care of vac including charging/changing canister/number to call for questions/complications  Pt taken via wheelchair to family car at discharge

## 2022-05-19 ENCOUNTER — 1 MONTH POST-OP (OUTPATIENT)
Dept: URBAN - METROPOLITAN AREA CLINIC 6 | Facility: CLINIC | Age: 57
End: 2022-05-19

## 2022-05-19 DIAGNOSIS — Z96.1: ICD-10-CM

## 2022-05-19 PROCEDURE — 99024 POSTOP FOLLOW-UP VISIT: CPT

## 2022-05-19 ASSESSMENT — KERATOMETRY
OS_AXISANGLE_DEGREES: 180
OS_K2POWER_DIOPTERS: 38.50
OS_AXISANGLE2_DEGREES: 90
OS_K1POWER_DIOPTERS: 38.50

## 2022-05-19 ASSESSMENT — TONOMETRY
OS_IOP_MMHG: 13
OD_IOP_MMHG: 12

## 2022-05-19 ASSESSMENT — VISUAL ACUITY
OS_SC: 20/25-1
OD_SC: 20/60-1

## 2022-05-21 PROCEDURE — 99284 EMERGENCY DEPT VISIT MOD MDM: CPT

## 2022-05-22 ENCOUNTER — HOSPITAL ENCOUNTER (EMERGENCY)
Facility: HOSPITAL | Age: 57
Discharge: HOME | End: 2022-05-22
Attending: EMERGENCY MEDICINE | Admitting: EMERGENCY MEDICINE
Payer: MEDICARE

## 2022-05-22 ENCOUNTER — APPOINTMENT (EMERGENCY)
Dept: RADIOLOGY | Facility: HOSPITAL | Age: 57
End: 2022-05-22
Payer: MEDICARE

## 2022-05-22 VITALS
DIASTOLIC BLOOD PRESSURE: 81 MMHG | BODY MASS INDEX: 36.82 KG/M2 | OXYGEN SATURATION: 96 % | TEMPERATURE: 98.7 F | SYSTOLIC BLOOD PRESSURE: 108 MMHG | RESPIRATION RATE: 20 BRPM | HEART RATE: 78 BPM | WEIGHT: 264 LBS

## 2022-05-22 DIAGNOSIS — E11.621 DIABETIC FOOT ULCER (HCC): Primary | ICD-10-CM

## 2022-05-22 DIAGNOSIS — M79.605 LEFT LEG PAIN: ICD-10-CM

## 2022-05-22 DIAGNOSIS — L97.509 DIABETIC FOOT ULCER (HCC): Primary | ICD-10-CM

## 2022-05-22 LAB
ANION GAP SERPL CALCULATED.3IONS-SCNC: 8 MMOL/L (ref 4–13)
BASOPHILS # BLD AUTO: 0.05 THOUSANDS/ΜL (ref 0–0.1)
BASOPHILS NFR BLD AUTO: 1 % (ref 0–1)
BUN SERPL-MCNC: 30 MG/DL (ref 5–25)
CALCIUM SERPL-MCNC: 8.9 MG/DL (ref 8.3–10.1)
CHLORIDE SERPL-SCNC: 100 MMOL/L (ref 100–108)
CO2 SERPL-SCNC: 25 MMOL/L (ref 21–32)
CREAT SERPL-MCNC: 1.48 MG/DL (ref 0.6–1.3)
D DIMER PPP FEU-MCNC: 0.5 UG/ML FEU
EOSINOPHIL # BLD AUTO: 0.2 THOUSAND/ΜL (ref 0–0.61)
EOSINOPHIL NFR BLD AUTO: 3 % (ref 0–6)
ERYTHROCYTE [DISTWIDTH] IN BLOOD BY AUTOMATED COUNT: 12.3 % (ref 11.6–15.1)
GFR SERPL CREATININE-BSD FRML MDRD: 51 ML/MIN/1.73SQ M
GLUCOSE SERPL-MCNC: 164 MG/DL (ref 65–140)
HCT VFR BLD AUTO: 38.7 % (ref 36.5–49.3)
HGB BLD-MCNC: 12.7 G/DL (ref 12–17)
IMM GRANULOCYTES # BLD AUTO: 0.02 THOUSAND/UL (ref 0–0.2)
IMM GRANULOCYTES NFR BLD AUTO: 0 % (ref 0–2)
LACTATE SERPL-SCNC: 1.2 MMOL/L (ref 0.5–2)
LYMPHOCYTES # BLD AUTO: 1.41 THOUSANDS/ΜL (ref 0.6–4.47)
LYMPHOCYTES NFR BLD AUTO: 20 % (ref 14–44)
MCH RBC QN AUTO: 31.1 PG (ref 26.8–34.3)
MCHC RBC AUTO-ENTMCNC: 32.8 G/DL (ref 31.4–37.4)
MCV RBC AUTO: 95 FL (ref 82–98)
MONOCYTES # BLD AUTO: 0.68 THOUSAND/ΜL (ref 0.17–1.22)
MONOCYTES NFR BLD AUTO: 10 % (ref 4–12)
NEUTROPHILS # BLD AUTO: 4.58 THOUSANDS/ΜL (ref 1.85–7.62)
NEUTS SEG NFR BLD AUTO: 66 % (ref 43–75)
NRBC BLD AUTO-RTO: 0 /100 WBCS
PLATELET # BLD AUTO: 245 THOUSANDS/UL (ref 149–390)
PMV BLD AUTO: 10 FL (ref 8.9–12.7)
POTASSIUM SERPL-SCNC: 4.6 MMOL/L (ref 3.5–5.3)
PROCALCITONIN SERPL-MCNC: 0.1 NG/ML
RBC # BLD AUTO: 4.09 MILLION/UL (ref 3.88–5.62)
SODIUM SERPL-SCNC: 133 MMOL/L (ref 136–145)
WBC # BLD AUTO: 6.94 THOUSAND/UL (ref 4.31–10.16)

## 2022-05-22 PROCEDURE — 83605 ASSAY OF LACTIC ACID: CPT | Performed by: EMERGENCY MEDICINE

## 2022-05-22 PROCEDURE — 73630 X-RAY EXAM OF FOOT: CPT

## 2022-05-22 PROCEDURE — 84145 PROCALCITONIN (PCT): CPT | Performed by: EMERGENCY MEDICINE

## 2022-05-22 PROCEDURE — 80048 BASIC METABOLIC PNL TOTAL CA: CPT | Performed by: EMERGENCY MEDICINE

## 2022-05-22 PROCEDURE — 99285 EMERGENCY DEPT VISIT HI MDM: CPT | Performed by: EMERGENCY MEDICINE

## 2022-05-22 PROCEDURE — 36415 COLL VENOUS BLD VENIPUNCTURE: CPT | Performed by: EMERGENCY MEDICINE

## 2022-05-22 PROCEDURE — 96375 TX/PRO/DX INJ NEW DRUG ADDON: CPT

## 2022-05-22 PROCEDURE — 85379 FIBRIN DEGRADATION QUANT: CPT | Performed by: EMERGENCY MEDICINE

## 2022-05-22 PROCEDURE — 96374 THER/PROPH/DIAG INJ IV PUSH: CPT

## 2022-05-22 PROCEDURE — 85025 COMPLETE CBC W/AUTO DIFF WBC: CPT | Performed by: EMERGENCY MEDICINE

## 2022-05-22 RX ORDER — MORPHINE SULFATE 4 MG/ML
4 INJECTION, SOLUTION INTRAMUSCULAR; INTRAVENOUS ONCE
Status: COMPLETED | OUTPATIENT
Start: 2022-05-22 | End: 2022-05-22

## 2022-05-22 RX ORDER — TRAMADOL HYDROCHLORIDE 50 MG/1
50 TABLET ORAL EVERY 6 HOURS PRN
Qty: 8 TABLET | Refills: 0 | Status: SHIPPED | OUTPATIENT
Start: 2022-05-22 | End: 2022-06-15 | Stop reason: SDUPTHER

## 2022-05-22 RX ORDER — HYDROMORPHONE HCL/PF 1 MG/ML
0.5 SYRINGE (ML) INJECTION ONCE
Status: COMPLETED | OUTPATIENT
Start: 2022-05-22 | End: 2022-05-22

## 2022-05-22 RX ORDER — ONDANSETRON 2 MG/ML
4 INJECTION INTRAMUSCULAR; INTRAVENOUS ONCE
Status: COMPLETED | OUTPATIENT
Start: 2022-05-22 | End: 2022-05-22

## 2022-05-22 RX ADMIN — ONDANSETRON 4 MG: 2 INJECTION INTRAMUSCULAR; INTRAVENOUS at 01:27

## 2022-05-22 RX ADMIN — HYDROMORPHONE HYDROCHLORIDE 0.5 MG: 1 INJECTION, SOLUTION INTRAMUSCULAR; INTRAVENOUS; SUBCUTANEOUS at 02:26

## 2022-05-22 RX ADMIN — MORPHINE SULFATE 4 MG: 4 INJECTION INTRAVENOUS at 01:27

## 2022-05-22 NOTE — CONSULTS
Podiatry - Consultation    Patient Information:   Nnamdi Schneider 59 y o  male MRN: [id number]  Unit/Bed#: ED 15 Encounter: [id number]  PCP: Robert Kee MD  Date of Admission:  5/22/2022  Date of Consultation: 05/22/22  Requesting Physician: Kyler Villalobos MD      ASSESSMENT:    Nnamdi Schneider is a 59 y o  male with:    1  Diabetic wound left foot  2  T2DM  3  CKD2  4  Lombar radiculopathy    PLAN:    · Left foot wound is stable with no gross signs of infection  Pt is hemodynamically stable, VSS and no leukocytosis  Xray was reviewed: no MIS  Cortical erosion of 5th metatarsal head adjacent to wound with concern for osteomyelitis to 5th metatarsal head  I have communicated findings with Attending  Pt to f/u out pt with Dr Davonte Siddiqui  · Wound was debrided per note bellow  · Local wound care consisting of xeroform dsd  · Elevation and offloading on green foam wedges or pillows when non-ambulatory  · Rest of care per primary team       Weightbearing status: NWB LLE    Debridement Procedure:    After  Verbal Consent was obtained and time out performed  Wound located left lateral foot was  excisionally Surgical- Subcutaneous  (CPT: 28052) debrided using scapel  Pre-debridement wound measures 3 cm x 3 cm x 0 4 cm  Pain was controlled by Lack of sensation due to Neuropathy   Post debridement measurement 3 1 cm x 3 1 cm x 0 4 cm for a total of <20 square centimeters, with wound appearing Fresh bleeding tissue, viable and granular  100%  of wound debrided  Tissue debrided includes depth of Subcutaneous with devitalized tissue being debrided including Fibrous  with a small amount of bleeding bleeding was noted during procedure  Hemostasis was achieved using pressure  Pain noted during procedure rated as 0  Pain noted after procedure rated as 0  Procedure was Well tolerated by patient      SUBJECTIVE:    History of Present Illness:    Nnamdi Schneider is a 59 y o  male who is originally seen in the ED 5/22/2022 due to left lower extremity pain  Patient has a past medical history of T2DM,CKD 3, Obesity  S/p left foot washout/wound debridement DOS: 4/28/22    We are consulted for left foot wound  Pt reports no change to wound side since he was seen by Dr No Mckeon in the office last week  Pt denies increase in drainage  Pt denies purulence  Pt reports that he has VNA set up to address for left foot wound care  Pt states that he is seen 3 time a week  Pt denies, nausea, vomiting, chills, fevers  Pt also reports LE pain L>R pt describes the pain as burning, tingling sensation  Pt states that he has taken tramadol for 10 years in the past  Pt adds that tramadol works well for his pain  Pt reports seeing pain management in the past but states it was not very helpful  Review of Systems:    Constitutional: Negative  HENT: Negative  Eyes: Negative  Respiratory: Negative  Cardiovascular: Negative  Gastrointestinal: Negative  Musculoskeletal: LE pain   Skin:left foot wound   Neurological: peripheral neuropathy   Psych: Negative       Past Medical and Surgical History:     Past Medical History:   Diagnosis Date    H/O eye surgery     High blood sugar     Hypertension        Past Surgical History:   Procedure Laterality Date    HERNIA REPAIR      KIDNEY SURGERY      MOUTH SURGERY      WOUND DEBRIDEMENT Left 4/28/2022    Procedure: Left foot washout;  Surgeon: Basia Raza DPM;  Location: [facility];  Service: Podiatry       Meds/Allergies:    (Not in a hospital admission)      Allergies   Allergen Reactions    Cephalexin Hives     Skin peeling  Tolerates penicillins (tolerated unasyn and zosyn)    Metformin GI Intolerance       Social History:     Marital Status: Registered Domestic Partner    Substance Use History:   Social History     Substance and Sexual Activity   Alcohol Use Yes    Comment: ocassional     Social History     Tobacco Use   Smoking Status Never Smoker   Smokeless Tobacco Never Used     Social History Substance and Sexual Activity   Drug Use Never       Family History:    No family history on file  OBJECTIVE:    Vitals:   Blood Pressure: 136/87 (05/22/22 0358)  Pulse: 90 (05/22/22 0358)  Temperature: 98 7 °F (37 1 °C) (05/22/22 0005)  Temp Source: Oral (05/22/22 0005)  Respirations: 19 (05/22/22 0358)  Weight - Scale: 120 kg (264 lb) (05/22/22 0005)  SpO2: 95 % (05/22/22 0358)    Physical Exam:    General Appearance: Alert, cooperative, no distress  HEENT: Head normocephalic, atraumatic, without obvious abnormality  Heart: Normal rate and rhythm  Lungs: Non-labored breathing  No respiratory distress  Abdomen: Without distension  Psychiatric: AAOx3  Lower Extremity:    Vascular:   DP: Right: 2+ Left: 2+  PT: Right: 2+ Left: 2+  CRT < 3 seconds at the digits  +0/4 edema noted at bilateral lower extremities  Pedal hair is absent  Skin temperature is WNL bilaterally  Musculoskeletal:  MMT is 5/5 in all muscle compartments bilaterally  ROM at the 1st MPJ and ankle joint are decreased bilaterally with the leg extended  No Pain on palpation of left foot  Dermatological:  Lower extremity wound(s) as noted below:    Wound #: 1  Location: left lateral foot  Length 3 1cm: Width 3 1cm: Depth 0 4cm:   Deepest Tissue Noted in Base: subq  Probe to Bone: No  Peripheral Skin Description: Attached  Granulation: 100% Fibrotic Tissue: 0% Necrotic Tissue: 0%   Drainage Amount: serosanguinous  Signs of Infection: No no crepitus, no fluctuance, no purulence expressed, no blisters, no ascending erythema noted at this time     Neurological:  Gross sensation is diminished  Protective sensation is diminished      Clinical Images 05/22/22:  pre debridement    post debridement        Additional data:     Lab Results: I have personally reviewed pertinent labs including:    Results from last 7 days   Lab Units 05/22/22  0121   WBC Thousand/uL 6 94   HEMOGLOBIN g/dL 12 7   HEMATOCRIT % 38 7   PLATELETS Thousands/uL 245   NEUTROS PCT % 66   LYMPHS PCT % 20   MONOS PCT % 10   EOS PCT % 3     Results from last 7 days   Lab Units 05/22/22  0121   POTASSIUM mmol/L 4 6   CHLORIDE mmol/L 100   CO2 mmol/L 25   BUN mg/dL 30*   CREATININE mg/dL 1 48*   CALCIUM mg/dL 8 9           Cultures: I have personally reviewed pertinent cultures including:              Imaging: I have personally reviewed pertinent reports in PACS  EKG, Pathology, and Other Studies: I have personally reviewed pertinent reports  Time Spent for Care: 30 minutes  More than 50% of total time spent on counseling and coordination of care as described above  ** Please Note: Portions of the record may have been created with voice recognition software  Occasional wrong word or "sound a like" substitutions may have occurred due to the inherent limitations of voice recognition software  Read the chart carefully and recognize, using context, where substitutions have occurred   **

## 2022-05-22 NOTE — DISCHARGE INSTRUCTIONS
Discharge Instructions - Podiatry    Weight Bearing Status: Non-weight bearing  left lower extremity (continue to follow all of Dr Che Garza instructions)                     Wound Care: Leave dressings clean, dry, and intact between professional dressing changes

## 2022-05-22 NOTE — ED CARE HANDOFF
Emergency Department Sign Out Note        Sign out and transfer of care from Dr Yaima Blunt  See Separate Emergency Department note  The patient, Horacio Betts, was evaluated by the previous provider for Leg/Foot pain  Workup Completed:  CBC, BMP    ED Course / Workup Pending (followup):  D-dimer - 0 50  Pt is 59, using an age adjusted d-dimer this is negative and does not require anticoagulation or vascular study  Pt is waiting to see podiatry  Procedures  MDM        Disposition  Final diagnoses:   Diabetic foot ulcer ([facility] [address] )     Time reflects when diagnosis was documented in both MDM as applicable and the Disposition within this note     Time User Action Codes Description Comment    5/22/2022  1:38 AM Josh Kaufman Add [[id number],  [id number]] Diabetic foot ulcer [facility])       ED Disposition     None      Follow-up Information    None       Patient's Medications   Discharge Prescriptions    No medications on file     No discharge procedures on file         ED Provider  Electronically Signed by     Aj Jacobs MD  05/22/22 6683

## 2022-05-22 NOTE — ED PROVIDER NOTES
History  Chief Complaint   Patient presents with    Leg Pain     Patient reports left leg pain  Had surgery on left foot on April 29h      59year-old male presents for evaluation of left leg and left foot pain  Patient has a wound she has had surgery on  He states the pain has been getting progressively worse for the last day to 2 days  Pain is rated as severe  He notes purulent drainage from the wound site itself  Patient does have a sharp shooting pain throughout his left leg into his left lower buttocks  This pain has been present since after surgery in April  Is unchanged today worse with movement  No chest pain, shortness of breath, palpitations, dyspnea on exertion  History provided by:  Patient  Leg Pain  Associated symptoms: no fatigue and no fever        Prior to Admission Medications   Prescriptions Last Dose Informant Patient Reported? Taking? Alpha-Lipoic Acid 600 MG CAPS   Yes Yes   Sig: Take 600 mg by mouth 2 (two) times a day     Apple Cider Vinegar 300 MG TABS   Yes Yes   Sig: Take 300 mg by mouth daily     Ascorbic Acid (vitamin C) 1000 MG tablet   Yes Yes   Sig: Take 1,000 mg by mouth 2 (two) times a day   BENFOTIAMINE PO   Yes Yes   Sig: Take 300 mg by mouth 2 (two) times a day     Blood Glucose Monitoring Suppl (OneTouch Verio Sync System) w/Device KIT  Self No Yes   Sig: Use daily Test sugar daily in the morning     [employer] CINNAMON PO  Self Yes Yes   Sig: Take by mouth 2 (two) times a day    Chromium Picolinate 500 MCG TABS  Self Yes Yes   Sig: Take by mouth daily    Empagliflozin (Jardiance) 25 MG TABS   No Yes   Sig: Take 1 tablet (25 mg total) by mouth daily   Garlic 1261 MG CAPS  Self Yes Yes   Sig: Take by mouth 2 (two) times a day    Loteprednol Etabonate 1 % SUSP   Yes Yes   Sig: Apply to eye 2 (two) times a day I drop right eye   Magnesium ([employer]S Triple Magnesium Complex) 400 MG CAPS   Yes Yes   Sig: Take by mouth daily   Misc Natural Products (Blood Sugar Balance) TABS   Yes Yes   Sig: Take by mouth 2 (two) times a day   Multiple Vitamin (MULTI VITAMIN MENS PO)   Yes Yes   Sig: Take by mouth once   Potassium 99 MG TABS  Self Yes Yes   Sig: Take by mouth daily    Semaglutide,0 25 or 0 5MG/DOS, (Ozempic, 0 25 or 0 5 MG/DOSE,) 2 MG/1 5ML SOPN   No Yes   Sig: Inject 0 25 mg under the skin once a week   Turmeric (QC TUMERIC COMPLEX PO)   Yes Yes   Sig: Take 1,000 mg by mouth once Tumeric /curcimin   VITAMIN D PO   Yes Yes   Sig: Take by mouth 2 (two) times a day   Zinc 50 MG CAPS  Self Yes Yes   Sig: Take by mouth 2 (two) times a day    beta carotene 30 MG capsule  Self Yes Yes   Sig: Take 30 mg by mouth 2 (two) times a day     carvedilol (COREG) 25 mg tablet   No Yes   Sig: Take 1 tablet (25 mg total) by mouth 2 (two) times a day with meals   gentamicin (GARAMYCIN) 0 3 % ophthalmic solution   Yes Yes   Si drop  in the morning and 1 drop before bedtime  Right eye     glucose blood (Contour Next Test) test strip   No Yes   Sig: Use to check blood sugar once a day   glucose blood (OneTouch Verio) test strip  Self No Yes   Sig: Test sugar daily in the morning  lisinopril-hydrochlorothiazide (PRINZIDE,ZESTORETIC) 20-12 5 MG per tablet   No Yes   Sig: Take 1 tablet by mouth 2 (two) times a day   vitamin E, tocopherol, 400 units capsule  Self Yes Yes   Sig: Take 400 Units by mouth daily      Facility-Administered Medications: None       Past Medical History:   Diagnosis Date    H/O eye surgery     High blood sugar     Hypertension        Past Surgical History:   Procedure Laterality Date    HERNIA REPAIR      KIDNEY SURGERY      MOUTH SURGERY      WOUND DEBRIDEMENT Left 2022    Procedure: Left foot washout;  Surgeon: Giacomo Mccarthy DPM;  Location: [facility];  Service: Podiatry       No family history on file  I have reviewed and agree with the history as documented      E-Cigarette/Vaping    E-Cigarette Use Never User      E-Cigarette/Vaping Substances    Nicotine No     THC No     CBD No     Flavoring No     Other No     Unknown No      Social History     Tobacco Use    Smoking status: Never Smoker    Smokeless tobacco: Never Used   Vaping Use    Vaping Use: Never used   Substance Use Topics    Alcohol use: Yes     Comment: ocassional    Drug use: Never       Review of Systems   Constitutional: Negative for activity change, appetite change, fatigue and fever  HENT: Negative for congestion, dental problem, ear pain, rhinorrhea and sore throat  Eyes: Negative for pain and redness  Respiratory: Negative for chest tightness, shortness of breath and wheezing  Cardiovascular: Negative for chest pain and palpitations  Gastrointestinal: Negative for abdominal pain, blood in stool, constipation, diarrhea, nausea and vomiting  Endocrine: Negative for cold intolerance and heat intolerance  Genitourinary: Negative for dysuria, frequency and hematuria  Musculoskeletal: Negative for arthralgias and myalgias  Skin: Positive for wound  Negative for color change, pallor and rash  Neurological: Negative for weakness and numbness  Hematological: Does not bruise/bleed easily  Psychiatric/Behavioral: Negative for agitation, hallucinations and suicidal ideas  Physical Exam  Physical Exam  Constitutional:       Appearance: Normal appearance  He is well-developed  HENT:      Head: Normocephalic and atraumatic  Eyes:      General: No scleral icterus  Conjunctiva/sclera: Conjunctivae normal    Neck:      Vascular: No JVD  Trachea: No tracheal deviation  Pulmonary:      Effort: Pulmonary effort is normal  No respiratory distress  Abdominal:      General: There is no distension  Tenderness: There is no abdominal tenderness  There is no right CVA tenderness or left CVA tenderness  Comments: Under palpation over thoracic and lumbar spines  Minimally tender palpation over lower lumbar region, positive straight leg raise    Patient is tender palpation over the thigh and calf  Left foot with wound  Please refer to media image of wound  Tenderness palpation and warmth noted over the wound  Musculoskeletal:         General: No deformity  Normal range of motion  Cervical back: Normal range of motion  Skin:     Coloration: Skin is not pale  Findings: No erythema or rash  Neurological:      Mental Status: He is alert and oriented to person, place, and time     Psychiatric:         Behavior: Behavior normal          Vital Signs  ED Triage Vitals   Temperature Pulse Respirations Blood Pressure SpO2   05/22/22 0005 05/22/22 0005 05/22/22 0005 05/22/22 0005 05/22/22 0009   98 7 °F (37 1 °C) 93 16 133/91 98 %      Temp Source Heart Rate Source Patient Position - Orthostatic VS BP Location FiO2 (%)   05/22/22 0005 05/22/22 0121 -- -- --   Oral Monitor         Pain Score       05/22/22 0005       9           Vitals:    05/22/22 0005 05/22/22 0121   BP: 133/91 121/86   Pulse: 93 84         Visual Acuity      ED Medications  Medications   morphine (PF) 4 mg/mL injection 4 mg (4 mg Intravenous Given 5/22/22 0127)   ondansetron (ZOFRAN) injection 4 mg (4 mg Intravenous Given 5/22/22 0127)       Diagnostic Studies  Results Reviewed     Procedure Component Value Units Date/Time    CBC and differential [[id number]] Collected: 05/22/22 0121    Lab Status: Final result Specimen: Blood from Arm, Left Updated: 05/22/22 0131     WBC 6 94 Thousand/uL      RBC 4 09 Million/uL      Hemoglobin 12 7 g/dL      Hematocrit 38 7 %      MCV 95 fL      MCH 31 1 pg      MCHC 32 8 g/dL      RDW 12 3 %      MPV 10 0 fL      Platelets 405 Thousands/uL      nRBC 0 /100 WBCs      Neutrophils Relative 66 %      Immat GRANS % 0 %      Lymphocytes Relative 20 %      Monocytes Relative 10 %      Eosinophils Relative 3 %      Basophils Relative 1 %      Neutrophils Absolute 4 58 Thousands/µL      Immature Grans Absolute 0 02 Thousand/uL      Lymphocytes Absolute 1 41 Thousands/µL Monocytes Absolute 0 68 Thousand/µL      Eosinophils Absolute 0 20 Thousand/µL      Basophils Absolute 0 05 Thousands/µL     Lactic acid [[id number]] Collected: 05/22/22 0121    Lab Status: In process Specimen: Blood from Arm, Left Updated: 05/22/22 0129    D-Dimer [[id number]] Collected: 05/22/22 0121    Lab Status: In process Specimen: Blood from Arm, Left Updated: 05/22/22 0128    Procalcitonin [[id number]] Collected: 05/22/22 0121    Lab Status: In process Specimen: Blood from Arm, Left Updated: 05/22/22 4993    Basic metabolic panel [[id number]] Collected: 05/22/22 0121    Lab Status: In process Specimen: Blood from Arm, Left Updated: 05/22/22 0127                 XR foot 3+ views LEFT    (Results Pending)              Procedures  Procedures         ED Course                               SBIRT 22yo+    Flowsheet Row Most Recent Value   SBIRT (23 yo +)    In order to provide better care to our patients, we are screening all of our patients for alcohol and drug use  Would it be okay to ask you these screening questions? Yes Filed at: 05/22/2022 0128   Initial Alcohol Screen: US AUDIT-C     1  How often do you have a drink containing alcohol? 0 Filed at: 05/22/2022 0128   2  How many drinks containing alcohol do you have on a typical day you are drinking? 0 Filed at: 05/22/2022 0128   3a  Male UNDER 65: How often do you have five or more drinks on one occasion? 0 Filed at: 05/22/2022 0128   3b  FEMALE Any Age, or MALE 65+: How often do you have 4 or more drinks on one occassion? 0 Filed at: 05/22/2022 0128   Audit-C Score 0 Filed at: 05/22/2022 0128   GARCÍA: How many times in the past year have you    Used an illegal drug or used a prescription medication for non-medical reasons? Never Filed at: 05/22/2022 0128                    MDM  Number of Diagnoses or Management Options  Diagnosis management comments: Left foot pain and wound-will do x-ray rule out osteo, labs, consult Podiatry      Left leg pain likely secondary to sciatica less likely secondary to DVT-will do a D-dimer to rule out DVT      Disposition  Final diagnoses:   None     ED Disposition     None      Follow-up Information    None         Patient's Medications   Discharge Prescriptions    No medications on file       No discharge procedures on file      PDMP Review       Value Time User    PDMP Reviewed  Yes 4/2/2022 12:08 AM Damaris Barraza PA-C          ED Provider  Electronically Signed by           Charlyne Ahumada, MD  05/22/22 0663

## 2022-05-27 ENCOUNTER — TELEPHONE (OUTPATIENT)
Dept: INFECTIOUS DISEASES | Facility: CLINIC | Age: 57
End: 2022-05-27

## 2022-05-27 NOTE — TELEPHONE ENCOUNTER
Patient calls today regarding recurrent infections  Patient states that this infection is back and now there is osteomyelitis  He is going in Tuesday for amputation of bone/toe to [facility]  Patient wants you to know that he will be in and would like to be consulted for this regarding continued treatment      CB: [phone]

## 2022-05-31 ENCOUNTER — APPOINTMENT (EMERGENCY)
Dept: RADIOLOGY | Facility: HOSPITAL | Age: 57
DRG: 617 | End: 2022-05-31
Payer: MEDICARE

## 2022-05-31 ENCOUNTER — HOSPITAL ENCOUNTER (INPATIENT)
Facility: HOSPITAL | Age: 57
LOS: 9 days | Discharge: HOME HEALTH | DRG: 617 | End: 2022-06-09
Attending: EMERGENCY MEDICINE | Admitting: INTERNAL MEDICINE
Payer: MEDICARE

## 2022-05-31 ENCOUNTER — ANESTHESIA EVENT (INPATIENT)
Dept: PERIOP | Facility: HOSPITAL | Age: 57
DRG: 617 | End: 2022-05-31
Payer: MEDICARE

## 2022-05-31 ENCOUNTER — APPOINTMENT (INPATIENT)
Dept: NON INVASIVE DIAGNOSTICS | Facility: HOSPITAL | Age: 57
DRG: 617 | End: 2022-05-31
Payer: MEDICARE

## 2022-05-31 DIAGNOSIS — E11.621 DIABETIC ULCER OF LEFT MIDFOOT ASSOCIATED WITH TYPE 2 DIABETES MELLITUS, WITH NECROSIS OF BONE (HCC): ICD-10-CM

## 2022-05-31 DIAGNOSIS — L97.509 DIABETIC FOOT ULCER (HCC): ICD-10-CM

## 2022-05-31 DIAGNOSIS — L97.529 DIABETIC ULCER OF LEFT FOOT (HCC): Primary | ICD-10-CM

## 2022-05-31 DIAGNOSIS — E11.621 DIABETIC FOOT ULCER (HCC): ICD-10-CM

## 2022-05-31 DIAGNOSIS — L97.424 DIABETIC ULCER OF LEFT MIDFOOT ASSOCIATED WITH TYPE 2 DIABETES MELLITUS, WITH NECROSIS OF BONE (HCC): ICD-10-CM

## 2022-05-31 DIAGNOSIS — M86.9 OSTEOMYELITIS OF RIGHT FOOT (HCC): ICD-10-CM

## 2022-05-31 DIAGNOSIS — E11.621 DIABETIC ULCER OF LEFT FOOT (HCC): Primary | ICD-10-CM

## 2022-05-31 PROBLEM — N17.9 ACUTE KIDNEY INJURY SUPERIMPOSED ON CHRONIC KIDNEY DISEASE (HCC): Status: ACTIVE | Noted: 2022-05-31

## 2022-05-31 PROBLEM — L97.519 DIABETIC ULCER OF RIGHT FOOT (HCC): Status: ACTIVE | Noted: 2022-05-31

## 2022-05-31 PROBLEM — N18.9 ACUTE KIDNEY INJURY SUPERIMPOSED ON CHRONIC KIDNEY DISEASE (HCC): Status: ACTIVE | Noted: 2022-05-31

## 2022-05-31 PROBLEM — Z01.818 PRE-OPERATIVE CLEARANCE: Status: ACTIVE | Noted: 2022-05-31

## 2022-05-31 LAB
ALBUMIN SERPL BCP-MCNC: 3.4 G/DL (ref 3.5–5)
ALP SERPL-CCNC: 71 U/L (ref 46–116)
ALT SERPL W P-5'-P-CCNC: 23 U/L (ref 12–78)
ANION GAP SERPL CALCULATED.3IONS-SCNC: 12 MMOL/L (ref 4–13)
APTT PPP: 30 SECONDS (ref 23–37)
AST SERPL W P-5'-P-CCNC: 16 U/L (ref 5–45)
ATRIAL RATE: 82 BPM
BASOPHILS # BLD AUTO: 0.05 THOUSANDS/ΜL (ref 0–0.1)
BASOPHILS NFR BLD AUTO: 1 % (ref 0–1)
BILIRUB SERPL-MCNC: 0.29 MG/DL (ref 0.2–1)
BUN SERPL-MCNC: 49 MG/DL (ref 5–25)
CALCIUM ALBUM COR SERPL-MCNC: 9.8 MG/DL (ref 8.3–10.1)
CALCIUM SERPL-MCNC: 9.3 MG/DL (ref 8.3–10.1)
CHLORIDE SERPL-SCNC: 98 MMOL/L (ref 100–108)
CO2 SERPL-SCNC: 24 MMOL/L (ref 21–32)
CREAT SERPL-MCNC: 2.29 MG/DL (ref 0.6–1.3)
CRP SERPL QL: 14.6 MG/L
EOSINOPHIL # BLD AUTO: 0.24 THOUSAND/ΜL (ref 0–0.61)
EOSINOPHIL NFR BLD AUTO: 3 % (ref 0–6)
ERYTHROCYTE [DISTWIDTH] IN BLOOD BY AUTOMATED COUNT: 12.5 % (ref 11.6–15.1)
ERYTHROCYTE [SEDIMENTATION RATE] IN BLOOD: 61 MM/HOUR (ref 0–19)
GFR SERPL CREATININE-BSD FRML MDRD: 30 ML/MIN/1.73SQ M
GLUCOSE SERPL-MCNC: 163 MG/DL (ref 65–140)
GLUCOSE SERPL-MCNC: 168 MG/DL (ref 65–140)
GLUCOSE SERPL-MCNC: 196 MG/DL (ref 65–140)
HCT VFR BLD AUTO: 38.6 % (ref 36.5–49.3)
HGB BLD-MCNC: 12.7 G/DL (ref 12–17)
IMM GRANULOCYTES # BLD AUTO: 0.03 THOUSAND/UL (ref 0–0.2)
IMM GRANULOCYTES NFR BLD AUTO: 0 % (ref 0–2)
INR PPP: 1.14 (ref 0.84–1.19)
LACTATE SERPL-SCNC: 1 MMOL/L (ref 0.5–2)
LYMPHOCYTES # BLD AUTO: 1.16 THOUSANDS/ΜL (ref 0.6–4.47)
LYMPHOCYTES NFR BLD AUTO: 16 % (ref 14–44)
MCH RBC QN AUTO: 30 PG (ref 26.8–34.3)
MCHC RBC AUTO-ENTMCNC: 32.9 G/DL (ref 31.4–37.4)
MCV RBC AUTO: 91 FL (ref 82–98)
MONOCYTES # BLD AUTO: 0.71 THOUSAND/ΜL (ref 0.17–1.22)
MONOCYTES NFR BLD AUTO: 10 % (ref 4–12)
NEUTROPHILS # BLD AUTO: 5.13 THOUSANDS/ΜL (ref 1.85–7.62)
NEUTS SEG NFR BLD AUTO: 70 % (ref 43–75)
NRBC BLD AUTO-RTO: 0 /100 WBCS
P AXIS: 32 DEGREES
PLATELET # BLD AUTO: 246 THOUSANDS/UL (ref 149–390)
PMV BLD AUTO: 9.7 FL (ref 8.9–12.7)
POTASSIUM SERPL-SCNC: 5.2 MMOL/L (ref 3.5–5.3)
PR INTERVAL: 174 MS
PROCALCITONIN SERPL-MCNC: 0.14 NG/ML
PROT SERPL-MCNC: 7.9 G/DL (ref 6.4–8.2)
PROTHROMBIN TIME: 14.3 SECONDS (ref 11.6–14.5)
QRS AXIS: 4 DEGREES
QRSD INTERVAL: 92 MS
QT INTERVAL: 368 MS
QTC INTERVAL: 429 MS
RBC # BLD AUTO: 4.24 MILLION/UL (ref 3.88–5.62)
SODIUM SERPL-SCNC: 134 MMOL/L (ref 136–145)
T WAVE AXIS: 30 DEGREES
VENTRICULAR RATE: 82 BPM
WBC # BLD AUTO: 7.32 THOUSAND/UL (ref 4.31–10.16)

## 2022-05-31 PROCEDURE — 93971 EXTREMITY STUDY: CPT

## 2022-05-31 PROCEDURE — 84145 PROCALCITONIN (PCT): CPT | Performed by: PHYSICIAN ASSISTANT

## 2022-05-31 PROCEDURE — 99285 EMERGENCY DEPT VISIT HI MDM: CPT

## 2022-05-31 PROCEDURE — 85730 THROMBOPLASTIN TIME PARTIAL: CPT | Performed by: PHYSICIAN ASSISTANT

## 2022-05-31 PROCEDURE — 99285 EMERGENCY DEPT VISIT HI MDM: CPT | Performed by: PHYSICIAN ASSISTANT

## 2022-05-31 PROCEDURE — 99223 1ST HOSP IP/OBS HIGH 75: CPT | Performed by: STUDENT IN AN ORGANIZED HEALTH CARE EDUCATION/TRAINING PROGRAM

## 2022-05-31 PROCEDURE — 83605 ASSAY OF LACTIC ACID: CPT | Performed by: PHYSICIAN ASSISTANT

## 2022-05-31 PROCEDURE — 87040 BLOOD CULTURE FOR BACTERIA: CPT | Performed by: PHYSICIAN ASSISTANT

## 2022-05-31 PROCEDURE — 85025 COMPLETE CBC W/AUTO DIFF WBC: CPT | Performed by: PHYSICIAN ASSISTANT

## 2022-05-31 PROCEDURE — 96360 HYDRATION IV INFUSION INIT: CPT

## 2022-05-31 PROCEDURE — 93005 ELECTROCARDIOGRAM TRACING: CPT

## 2022-05-31 PROCEDURE — 80053 COMPREHEN METABOLIC PANEL: CPT | Performed by: PHYSICIAN ASSISTANT

## 2022-05-31 PROCEDURE — 82948 REAGENT STRIP/BLOOD GLUCOSE: CPT

## 2022-05-31 PROCEDURE — 93971 EXTREMITY STUDY: CPT | Performed by: SURGERY

## 2022-05-31 PROCEDURE — 73630 X-RAY EXAM OF FOOT: CPT

## 2022-05-31 PROCEDURE — 86140 C-REACTIVE PROTEIN: CPT | Performed by: PHYSICIAN ASSISTANT

## 2022-05-31 PROCEDURE — 85610 PROTHROMBIN TIME: CPT | Performed by: PHYSICIAN ASSISTANT

## 2022-05-31 PROCEDURE — 36415 COLL VENOUS BLD VENIPUNCTURE: CPT | Performed by: PHYSICIAN ASSISTANT

## 2022-05-31 PROCEDURE — 85652 RBC SED RATE AUTOMATED: CPT | Performed by: PHYSICIAN ASSISTANT

## 2022-05-31 PROCEDURE — 93010 ELECTROCARDIOGRAM REPORT: CPT | Performed by: INTERNAL MEDICINE

## 2022-05-31 PROCEDURE — 99223 1ST HOSP IP/OBS HIGH 75: CPT | Performed by: INTERNAL MEDICINE

## 2022-05-31 RX ORDER — CARVEDILOL 25 MG/1
25 TABLET ORAL 2 TIMES DAILY WITH MEALS
Status: DISCONTINUED | OUTPATIENT
Start: 2022-05-31 | End: 2022-06-09 | Stop reason: HOSPADM

## 2022-05-31 RX ORDER — CHLORHEXIDINE GLUCONATE 0.12 MG/ML
15 RINSE ORAL AS NEEDED
Status: DISCONTINUED | OUTPATIENT
Start: 2022-05-31 | End: 2022-06-01 | Stop reason: HOSPADM

## 2022-05-31 RX ORDER — PERPHENAZINE 16 MG
600 TABLET ORAL 2 TIMES DAILY
Status: DISCONTINUED | OUTPATIENT
Start: 2022-05-31 | End: 2022-05-31 | Stop reason: RX

## 2022-05-31 RX ORDER — ACETAMINOPHEN 325 MG/1
650 TABLET ORAL EVERY 6 HOURS PRN
Status: DISCONTINUED | OUTPATIENT
Start: 2022-05-31 | End: 2022-06-09 | Stop reason: HOSPADM

## 2022-05-31 RX ORDER — SODIUM CHLORIDE, SODIUM GLUCONATE, SODIUM ACETATE, POTASSIUM CHLORIDE, MAGNESIUM CHLORIDE, SODIUM PHOSPHATE, DIBASIC, AND POTASSIUM PHOSPHATE .53; .5; .37; .037; .03; .012; .00082 G/100ML; G/100ML; G/100ML; G/100ML; G/100ML; G/100ML; G/100ML
75 INJECTION, SOLUTION INTRAVENOUS CONTINUOUS
Status: DISCONTINUED | OUTPATIENT
Start: 2022-05-31 | End: 2022-06-01

## 2022-05-31 RX ORDER — SODIUM CHLORIDE 9 MG/ML
100 INJECTION, SOLUTION INTRAVENOUS CONTINUOUS
Status: DISCONTINUED | OUTPATIENT
Start: 2022-06-01 | End: 2022-05-31

## 2022-05-31 RX ORDER — INSULIN LISPRO 100 [IU]/ML
2-12 INJECTION, SOLUTION INTRAVENOUS; SUBCUTANEOUS
Status: DISCONTINUED | OUTPATIENT
Start: 2022-05-31 | End: 2022-06-09 | Stop reason: HOSPADM

## 2022-05-31 RX ORDER — TRAMADOL HYDROCHLORIDE 50 MG/1
50 TABLET ORAL EVERY 6 HOURS PRN
Status: DISCONTINUED | OUTPATIENT
Start: 2022-05-31 | End: 2022-06-02

## 2022-05-31 RX ORDER — HEPARIN SODIUM 5000 [USP'U]/ML
5000 INJECTION, SOLUTION INTRAVENOUS; SUBCUTANEOUS EVERY 8 HOURS SCHEDULED
Status: DISCONTINUED | OUTPATIENT
Start: 2022-05-31 | End: 2022-06-09 | Stop reason: HOSPADM

## 2022-05-31 RX ORDER — ZINC SULFATE 50(220)MG
220 CAPSULE ORAL DAILY
Status: DISCONTINUED | OUTPATIENT
Start: 2022-05-31 | End: 2022-06-09 | Stop reason: HOSPADM

## 2022-05-31 RX ORDER — OXYCODONE HYDROCHLORIDE 5 MG/1
5 TABLET ORAL EVERY 6 HOURS PRN
Status: DISCONTINUED | OUTPATIENT
Start: 2022-05-31 | End: 2022-06-01

## 2022-05-31 RX ORDER — ALPRAZOLAM 0.5 MG/1
1 TABLET ORAL
Status: DISCONTINUED | OUTPATIENT
Start: 2022-05-31 | End: 2022-06-09 | Stop reason: HOSPADM

## 2022-05-31 RX ORDER — OXYCODONE HYDROCHLORIDE AND ACETAMINOPHEN 5; 325 MG/1; MG/1
1 TABLET ORAL ONCE
Status: COMPLETED | OUTPATIENT
Start: 2022-05-31 | End: 2022-05-31

## 2022-05-31 RX ORDER — ASCORBIC ACID 500 MG
1000 TABLET ORAL 2 TIMES DAILY
Status: DISCONTINUED | OUTPATIENT
Start: 2022-05-31 | End: 2022-06-09 | Stop reason: HOSPADM

## 2022-05-31 RX ORDER — GENTAMICIN SULFATE 3 MG/ML
1 SOLUTION/ DROPS OPHTHALMIC 2 TIMES DAILY
Status: DISCONTINUED | OUTPATIENT
Start: 2022-05-31 | End: 2022-06-01

## 2022-05-31 RX ADMIN — GENTAMICIN SULFATE 1 DROP: 3 SOLUTION OPHTHALMIC at 21:50

## 2022-05-31 RX ADMIN — SODIUM CHLORIDE, SODIUM GLUCONATE, SODIUM ACETATE, POTASSIUM CHLORIDE, MAGNESIUM CHLORIDE, SODIUM PHOSPHATE, DIBASIC, AND POTASSIUM PHOSPHATE 75 ML/HR: .53; .5; .37; .037; .03; .012; .00082 INJECTION, SOLUTION INTRAVENOUS at 14:28

## 2022-05-31 RX ADMIN — OXYCODONE AND ACETAMINOPHEN 1 TABLET: 5; 325 TABLET ORAL at 11:00

## 2022-05-31 RX ADMIN — OXYCODONE HYDROCHLORIDE 5 MG: 5 TABLET ORAL at 21:51

## 2022-05-31 RX ADMIN — HYDROCHLOROTHIAZIDE: 12.5 TABLET ORAL at 21:50

## 2022-05-31 RX ADMIN — TRAMADOL HYDROCHLORIDE 50 MG: 50 TABLET, COATED ORAL at 20:14

## 2022-05-31 RX ADMIN — OXYCODONE HYDROCHLORIDE AND ACETAMINOPHEN 1000 MG: 500 TABLET ORAL at 21:51

## 2022-05-31 RX ADMIN — HEPARIN SODIUM 5000 UNITS: 5000 INJECTION INTRAVENOUS; SUBCUTANEOUS at 14:41

## 2022-05-31 RX ADMIN — SODIUM CHLORIDE 1000 ML: 0.9 INJECTION, SOLUTION INTRAVENOUS at 10:55

## 2022-05-31 RX ADMIN — CARVEDILOL 25 MG: 25 TABLET, FILM COATED ORAL at 21:51

## 2022-05-31 NOTE — CONSULTS
Consultation - Infectious Disease   Sanket Mcgee 59 y o  male MRN: [id number]  Unit/Bed#: E5 -01 Encounter: [id number]      IMPRESSION & RECOMMENDATIONS:     1  Infected left diabetic foot ulcer with underlying osteomyelitis of the 5th metatarsal head  Recurrent infection despite debridement and antibiotic therapy  Status post OR debridement 4/28/22; no purulence or sinus tracts seen, underlying tissue and bone appeared healthy and a wound vac was placed  MRI did not show osteomyelitis  The OR culture grew a few colonies Pseudomonas aeruginosa  The patient was clinically improving after source control, despite lack of antibiotic coverage  Antibiotics were switched to IV Zosyn, and then he was transitioned to PO levofloxacin at discharge to complete a 7 day total course  The patient now has persistent ulceration with progression of infection and underlying 5th metatarsal head osteomyelitis  He is clinically stable without fever or leukocytosis  The patient states that he needs higher doses of antibiotics and longer courses when he has an infection  I explained that higher doses will not provide additional benefit as the body can only metabolize certain doses, and if anything can cause harm    -follow up MRI  -Podiatry following, plan for 5th ray resection tomorrow  -given clinical stability, hold antibiotics until cultures collected in OR  -after surgery, start Zosyn 4 5g IV q6hr based on prior wound cultures  -follow up blood cultures     2  Diabetes mellitus-type 2  Without long-term insulin use  Risk factor for infection and poor wound healing       3  Chronic kidney disease stage 3   Creatinine elevated on admission  Suspect this is prerenal  -dose adjust antibiotics as needed  -volume management per SLIM     4  Obesity-with a body mass index of greater than 37  Risk factor for complicating infection and relapse   -stress weight loss through diet and exercise     5  Antibiotic allergies   Severe rash with cephalosporins, however he tolerates penicillins without difficulty  Tolerated zosyn and unasyn  Allergy list updated  I have discussed the above management plan in detail with the primary service  ID will follow  I have performed an extensive review of the medical records in Epic including review of the notes, radiographs, and laboratory results     HISTORY OF PRESENT ILLNESS:  Reason for Consult: osteomyelitis   HPI: Anita Mcmullen is a 59 y o  man with a history of diabetes mellitus, chronic kidney disease, hypertension, and chronic left foot wound who presents with a worsening left diabetic foot infection  The patient has had a chronic lateral left foot ulcer since April  [email or web address]-5/2/22 with worsening infection of the diabetic foot ulcer  MRI of the foot showed a fluid collection at the plantar 5th MTPJ without definitive signs of osteomyelitis  He underwent OR debridement 4/28/22 and wound vac placement  No purulence or sinus tracts were seen, and underlying tissue and bone appeared healthy  OR culture grew a few colonies Pseudomonas aeruginosa  The patient was improving after debridement alone, prior to switch to appropriate antibiotic coverage  He was treated with Zosyn IV inpatient after cultures resulted, and was then transitioned to PO levofloxacin 750mg daily to complete a total 7 day course  The patient states that he initially was improving, and the wound vac was removed  However, he was seen again in the ED 5/21/22 for worsening pain and drainage from the foot wound  Xray showed 5th metatarsal osteomyelitis  His wound was debrided and he was scheduled for outpatient follow up with Podiatry  He is now being admitted for partial 5th ray resection  The patient is afebrile without leukocytosis  Xray redemonstrates osteomyelitis of the left 5th metatarsal head      The patient feels that he was not treated long enough or with 'strong enough' doses of antibiotics, and that is why his infection recurred  He reports keeping the wound covered and clean  He denies fever and chills, but reports worsening pain and erythema or his left foot  REVIEW OF SYSTEMS:  A complete review of systems is negative other than that noted in the HPI  PAST MEDICAL HISTORY:  Past Medical History:   Diagnosis Date    H/O eye surgery     High blood sugar     Hypertension      Past Surgical History:   Procedure Laterality Date    HERNIA REPAIR      KIDNEY SURGERY      MOUTH SURGERY      WOUND DEBRIDEMENT Left 2022    Procedure: Left foot washout;  Surgeon: Carla Vallejo DPM;  Location: [facility];  Service: Podiatry       FAMILY HISTORY:  Non-contributory    SOCIAL HISTORY:  Social History   Social History     Substance and Sexual Activity   Alcohol Use Yes    Comment: ocassional     Social History     Substance and Sexual Activity   Drug Use Never     Social History     Tobacco Use   Smoking Status Never Smoker   Smokeless Tobacco Never Used       ALLERGIES:  Allergies   Allergen Reactions    Cephalexin Hives     Skin peeling  Tolerates penicillins (tolerated unasyn and zosyn)    Metformin GI Intolerance       MEDICATIONS:  All current active medications have been reviewed      PHYSICAL EXAM:  Temp:  [97 8 °F (36 6 °C)-98 9 °F (37 2 °C)] 97 8 °F (36 6 °C)  HR:  [81-99] 81  Resp:  [18-20] 20  BP: (108-117)/(65-83) 111/74  SpO2:  [93 %-96 %] 94 %  Temp (24hrs), Av 3 °F (36 8 °C), Min:97 8 °F (36 6 °C), Max:98 9 °F (37 2 °C)  Current: Temperature: 97 8 °F (36 6 °C)  No intake or output data in the 24 hours ending 22    General Appearance:  Appearing well, nontoxic, and in no distress   Head:  Normocephalic, without obvious abnormality, atraumatic   Eyes:  Conjunctiva pink and sclera anicteric, both eyes   Nose: Nares normal, mucosa normal, no drainage   Throat: Oropharynx moist without lesions   Neck: Supple, symmetrical, no adenopathy, no tenderness/mass/nodules   Back:   Symmetric, no curvature, ROM normal, no CVA tenderness   Lungs:   Clear to auscultation bilaterally, respirations unlabored   Chest Wall:  No tenderness or deformity   Heart:  RRR; no murmur, rub or gallop   Abdomen:   Soft, non-tender, non-distended, positive bowel sounds    Extremities: No cyanosis, clubbing or edema   Skin: Left lateral foot non healing ulcer with surround erythema, no purulence   Lymph nodes: Cervical, supraclavicular nodes normal   Neurologic: Alert and oriented times 3, extremity strength 5/5 and symmetric       LABS, IMAGING, & OTHER STUDIES:  Lab Results:  I have personally reviewed pertinent labs  Results from last 7 days   Lab Units 05/31/22  1045   WBC Thousand/uL 7 32   HEMOGLOBIN g/dL 12 7   PLATELETS Thousands/uL 246     Results from last 7 days   Lab Units 05/31/22  1045   SODIUM mmol/L 134*   POTASSIUM mmol/L 5 2   CHLORIDE mmol/L 98*   CO2 mmol/L 24   BUN mg/dL 49*   CREATININE mg/dL 2 29*   EGFR ml/min/1 73sq m 30   CALCIUM mg/dL 9 3   AST U/L 16   ALT U/L 23   ALK PHOS U/L 71     Results from last 7 days   Lab Units 05/31/22  1054 05/31/22  1045   BLOOD CULTURE  Received in Microbiology Lab  Culture in Progress  Received in Microbiology Lab  Culture in Progress  Results from last 7 days   Lab Units 05/31/22  1045   PROCALCITONIN ng/ml 0 14     Results from last 7 days   Lab Units 05/31/22  1045   CRP mg/L 14 6*               Imaging Studies:   I have personally reviewed pertinent imaging study reports and images in PACS  Xray left foot 5/31:    Bony destruction in the head of the 5th metatarsal with a pathologic fracture in keeping with osteomyelitis which appears worse compared to the most recent study   There is questionable erosion of the proximal, lateral aspect of the proximal phalanx of   the 5th toe   This raises concern for the presence of septic arthritis  Other Studies:   I have personally reviewed pertinent reports

## 2022-05-31 NOTE — ASSESSMENT & PLAN NOTE
Lab Results   Component Value Date    EGFR 30 05/31/2022    EGFR 51 05/22/2022    EGFR 41 05/02/2022    CREATININE 2 29 (H) 05/31/2022    CREATININE 1 48 (H) 05/22/2022    CREATININE 1 77 (H) 05/02/2022   · Baseline creatinine from 1 4-1 6  · Creatinine on admission 2  Twenty-nine  · Patient reports poor p o   Intake, also infection contributing  · Received 1 L of normal saline in the ED  · Continue plasma 75 cc/hour  · Avoid hypotension and nephrotoxins  · BMP tomorrow

## 2022-05-31 NOTE — ASSESSMENT & PLAN NOTE
Lab Results   Component Value Date    HGBA1C 6 6 (H) 04/26/2022       No results for input(s): POCGLU in the last 72 hours      Blood Sugar Average: Last 72 hrs:  ·  on Jardiance and Ozempic  · Start sliding scale while inpatient  · Diabetic diet

## 2022-05-31 NOTE — ANESTHESIA PREPROCEDURE EVALUATION
Procedure:  RAY RESECTION FOOT (Left Foot)  APPLICATION VAC DRESSING (Left Foot)    Relevant Problems   CARDIO   (+) Essential hypertension      /RENAL   (+) Acute kidney injury superimposed on chronic kidney disease (HCC)   (+) Chronic kidney disease, stage 3 (HCC)      MUSCULOSKELETAL   (+) Low back pain with sciatica      Endocrine   (+) Diabetes mellitus (HCC)   (+) Diabetic ulcer of left foot (HCC)      Nervous and Auditory   (+) Cervical radiculopathy   (+) Neuropathy      Other   (+) Class 2 severe obesity with serious comorbidity and body mass index (BMI) of 37 0 to 37 9 in adult [facility])        Physical Exam    Airway    Mallampati score: II  TM Distance: >3 FB  Neck ROM: full     Dental   upper dentures,     Cardiovascular  Rhythm: regular, Rate: normal, Cardiovascular exam normal    Pulmonary  Pulmonary exam normal Breath sounds clear to auscultation,     Other Findings        Anesthesia Plan  ASA Score- 3     Anesthesia Type- general with ASA Monitors  Additional Monitors:   Airway Plan: LMA  Comment: Anxious    Plan Factors-Exercise tolerance (METS): >4 METS  Chart reviewed  Existing labs reviewed  Patient is not a current smoker  Obstructive sleep apnea risk education given perioperatively  Induction- intravenous  Postoperative Plan-     Informed Consent- Anesthetic plan and risks discussed with patient

## 2022-05-31 NOTE — PLAN OF CARE
Problem: Potential for Falls  Goal: Patient will remain free of falls  Description: INTERVENTIONS:  - Educate patient/family on patient safety including physical limitations  - Instruct patient to call for assistance with activity   - Consult OT/PT to assist with strengthening/mobility   - Keep Call bell within reach  - Keep bed low and locked with side rails adjusted as appropriate  - Keep care items and personal belongings within reach  - Initiate and maintain comfort rounds  - Make Fall Risk Sign visible to staff  - Apply yellow socks and bracelet for high fall risk patients  - Consider moving patient to room near nurses station  Outcome: Progressing     Problem: PAIN - ADULT  Goal: Verbalizes/displays adequate comfort level or baseline comfort level  Description: Interventions:  - Encourage patient to monitor pain and request assistance  - Assess pain using appropriate pain scale  - Administer analgesics based on type and severity of pain and evaluate response  - Implement non-pharmacological measures as appropriate and evaluate response  - Consider cultural and social influences on pain and pain management  - Notify physician/advanced practitioner if interventions unsuccessful or patient reports new pain  Outcome: Progressing     Problem: INFECTION - ADULT  Goal: Absence or prevention of progression during hospitalization  Description: INTERVENTIONS:  - Assess and monitor for signs and symptoms of infection  - Monitor lab/diagnostic results  - Monitor all insertion sites, i e  indwelling lines, tubes, and drains  - Monitor endotracheal if appropriate and nasal secretions for changes in amount and color  - [place] appropriate cooling/warming therapies per order  - Administer medications as ordered  - Instruct and encourage patient and family to use good hand hygiene technique  - Identify and instruct in appropriate isolation precautions for identified infection/condition  Outcome: Progressing  Goal: Absence of fever/infection during neutropenic period  Description: INTERVENTIONS:  - Monitor WBC    Outcome: Progressing     Problem: SAFETY ADULT  Goal: Patient will remain free of falls  Description: INTERVENTIONS:  - Educate patient/family on patient safety including physical limitations  - Instruct patient to call for assistance with activity   - Consult OT/PT to assist with strengthening/mobility   - Keep Call bell within reach  - Keep bed low and locked with side rails adjusted as appropriate  - Keep care items and personal belongings within reach  - Initiate and maintain comfort rounds  - Make Fall Risk Sign visible to staff  - Apply yellow socks and bracelet for high fall risk patients  - Consider moving patient to room near nurses station  Outcome: Progressing  Goal: Maintain or return to baseline ADL function  Description: INTERVENTIONS:  -  Assess patient's ability to carry out ADLs; assess patient's baseline for ADL function and identify physical deficits which impact ability to perform ADLs (bathing, care of mouth/teeth, toileting, grooming, dressing, etc )  - Assess/evaluate cause of self-care deficits   - Assess range of motion  - Assess patient's mobility; develop plan if impaired  - Assess patient's need for assistive devices and provide as appropriate  - Encourage maximum independence but intervene and supervise when necessary  - Involve family in performance of ADLs  - Assess for home care needs following discharge   - Consider OT consult to assist with ADL evaluation and planning for discharge  - Provide patient education as appropriate  Outcome: Progressing  Goal: Maintains/Returns to pre admission functional level  Description: INTERVENTIONS:  - Perform BMAT or MOVE assessment daily    - Set and communicate daily mobility goal to care team and patient/family/caregiver     - Collaborate with rehabilitation services on mobility goals if consulted  - Out of bed for toileting  - Record patient progress and toleration of activity level   Outcome: Progressing     Problem: DISCHARGE PLANNING  Goal: Discharge to home or other facility with appropriate resources  Description: INTERVENTIONS:  - Identify barriers to discharge w/patient and caregiver  - Arrange for needed discharge resources and transportation as appropriate  - Identify discharge learning needs (meds, wound care, etc )  - Arrange for interpretive services to assist at discharge as needed  - Refer to Case Management Department for coordinating discharge planning if the patient needs post-hospital services based on physician/advanced practitioner order or complex needs related to functional status, cognitive ability, or social support system  Outcome: Progressing     Problem: Knowledge Deficit  Goal: Patient/family/caregiver demonstrates understanding of disease process, treatment plan, medications, and discharge instructions  Description: Complete learning assessment and assess knowledge base    Interventions:  - Provide teaching at level of understanding  - Provide teaching via preferred learning methods  Outcome: Progressing     Problem: METABOLIC, FLUID AND ELECTROLYTES - ADULT  Goal: Fluid balance maintained  Description: INTERVENTIONS:  - Monitor labs   - Monitor I/O and WT  - Instruct patient on fluid and nutrition as appropriate  - Assess for signs & symptoms of volume excess or deficit  Outcome: Progressing  Goal: Glucose maintained within target range  Description: INTERVENTIONS:  - Monitor Blood Glucose as ordered  - Assess for signs and symptoms of hyperglycemia and hypoglycemia  - Administer ordered medications to maintain glucose within target range  - Assess nutritional intake and initiate nutrition service referral as needed  Outcome: Progressing     Problem: HEMATOLOGIC - ADULT  Goal: Maintains hematologic stability  Description: INTERVENTIONS  - Assess for signs and symptoms of bleeding or hemorrhage  - Monitor labs  - Administer supportive blood products/factors as ordered and appropriate  Outcome: Progressing

## 2022-05-31 NOTE — ASSESSMENT & PLAN NOTE
· This patient is a 12-year-old male with a past medical history of hypertension, type 2 diabetes non insulin dependent, CKD stage 3 who presented with left foot wound  · Possible osteomyelitis  · Patient denies any history of heart disease, heart attack, stents, open-heart surgery, denies history of lung disease or stroke  · Denies chest pain palpitation shortness of breath at rest or exertion  · Check EKG and if no acute changes, patient is stable for podiatry procedure

## 2022-05-31 NOTE — ASSESSMENT & PLAN NOTE
Lab Results   Component Value Date    HGBA1C 6 6 (H) 04/26/2022       No results for input(s): POCGLU in the last 72 hours  Blood Sugar Average: Last 72 hrs:  · Patient was recently admitted for left foot ulcer, status post washout by Podiatry and wound VAC was placed    He was treated with IV antibiotics and then discharged on Levaquin to be finished on 05/07/2022  · Patient noticed increased drainage and redness around the wound  · He was seen by Podiatry as outpatient, x-ray was done which showed cortical erosion of 5th metatarsal head adjacent to wound concern for osteomyelitis of 5th metatarsal head  · On admission procalcitonin negative, lactic acid negative  · Afebrile, no white blood cell count  · ESR CRP elevated  · X-ray of the left foot pending  · MRI ordered by Podiatry  · Podiatry consult  · Infectious Disease consult, will await recommendations from Infectious Disease prior to starting antibiotics

## 2022-05-31 NOTE — CONSULTS
Podiatry - Consultation    Patient Information:   Tawana Irving 59 y o  male MRN: [id number]  Unit/Bed#: E5 -01 Encounter: [id number]  PCP: Moe Singh MD  Date of Admission:  5/31/2022  Date of Consultation: 05/31/22  Requesting Physician: Shira Ocampo MD      ASSESSMENT:    Tawana Irving is a 59 y o  male with:    1  Diabetic ulceration left lateral 5th MTPJ with underlying osteomyelitis - Concepcion 3  2  Dorsal 2nd digit eschar, left foot  3  T2DM  4  VIVIANA on CKD2  5  Lumbar radiculopathy    PLAN:    · Left foot X-ray reviewed: cortical erosion noted to the lateral aspect of the 5th metatarsal head, suggestive of osteomyelitis  · F/u MRI for surgical planning   · Patient to OR tomorrow with Dr Aissatou Means for L partial 5th ray resection with possible wound VAC application  · NPO midnight  · Cr 2 29 today with baseline around 1 60, VIVIANA management per internal medicine  · F/u infectious disease recommendations  · Vital signs stable with no leukocytosis  · Local wound care consisting of betadine adaptic, DSD  Wound care instructions placed  · Elevation and offloading on green foam wedges or pillows when non-ambulatory  · Rest of care per primary team   · Will discuss this plan with my attending and update as needed  Weightbearing status: WBAT to L foot    SUBJECTIVE:    History of Present Illness:    Tawana Irving is a 59 y o  male who is originally admitted 5/31/2022 due to diabetic ulceration of left foot  Patient has a past medical history of HTN, Obesity, CKD2  We are consulted for evaluation and management of the patient's left foot  The patient states that he was instructed to come to the ED by his outpatient podiatrist, Dr Aissatou Means  He states that his previous x-ray of his left foot on 5/22/22 was concerning for osteomyelitis  He states that he is aware that he will require surgical intervention to his left foot   The patient does endorse extreme pain on palpation to the left foot, ankle, and posterior calf  Regarding his right 2nd digit eschar, he states that he believes it was from "scraping his foot" about one week ago  He states that the area was originally draining, but for the past few days it has been "scabbed over " Patient denies nausea, vomiting, chest pain, shortness of breath, chills, fever  Review of Systems:    Constitutional: Negative  HENT: Negative  Eyes: Negative  Respiratory: Negative  Cardiovascular: Negative  Gastrointestinal: Negative  Musculoskeletal: left foot pain   Skin:left foot ulceration   Neurological: peripheral neuropathy   Psych: Negative       Past Medical and Surgical History:     Past Medical History:   Diagnosis Date    H/O eye surgery     High blood sugar     Hypertension        Past Surgical History:   Procedure Laterality Date    HERNIA REPAIR      KIDNEY SURGERY      MOUTH SURGERY      WOUND DEBRIDEMENT Left 4/28/2022    Procedure: Left foot washout;  Surgeon: Orlie Schilder, DPM;  Location: [facility] OR;  Service: Podiatry       Meds/Allergies:    Medications Prior to Admission   Medication    Alpha-Lipoic Acid 600 MG CAPS    Apple Cider Vinegar 300 MG TABS    Ascorbic Acid (vitamin C) 1000 MG tablet    BENFOTIAMINE PO    beta carotene 30 MG capsule    Blood Glucose Monitoring Suppl (OneTouch Verio Sync System) w/Device KIT    carvedilol (COREG) 25 mg tablet    Chromium Picolinate 500 MCG TABS    [employer]S CINNAMON PO    Empagliflozin (Jardiance) 25 MG TABS    Garlic 8266 MG CAPS    gentamicin (GARAMYCIN) 0 3 % ophthalmic solution    glucose blood (Contour Next Test) test strip    glucose blood (OneTouch Verio) test strip    lisinopril-hydrochlorothiazide (PRINZIDE,ZESTORETIC) 20-12 5 MG per tablet    Loteprednol Etabonate 1 % SUSP    Magnesium (CVS Triple Magnesium Complex) 400 MG CAPS    Misc Natural Products (Blood Sugar Balance) TABS    Multiple Vitamin (MULTI VITAMIN MENS PO)    Potassium 99 MG TABS    Semaglutide,0 25 or 0 5MG/DOS, (Ozempic, 0 25 or 0 5 MG/DOSE,) 2 MG/1 5ML SOPN    traMADol (Ultram) 50 mg tablet    Turmeric (QC TUMERIC COMPLEX PO)    VITAMIN D PO    vitamin E, tocopherol, 400 units capsule    Zinc 50 MG CAPS       Allergies   Allergen Reactions    Cephalexin Hives     Skin peeling  Tolerates penicillins (tolerated unasyn and zosyn)    Metformin GI Intolerance       Social History:     Marital Status: Registered Domestic Partner    Substance Use History:   Social History     Substance and Sexual Activity   Alcohol Use Yes    Comment: ocassional     Social History     Tobacco Use   Smoking Status Never Smoker   Smokeless Tobacco Never Used     Social History     Substance and Sexual Activity   Drug Use Never       Family History:    History reviewed  No pertinent family history  OBJECTIVE:    Vitals:   Blood Pressure: 115/83 (05/31/22 1358)  Pulse: 83 (05/31/22 1358)  Temperature: 98 3 °F (36 8 °C) (05/31/22 1358)  Temp Source: Oral (05/31/22 0951)  Respirations: 18 (05/31/22 1059)  Height: 6' (182 9 cm) (05/31/22 1424)  SpO2: 93 % (05/31/22 1358)    Physical Exam:    General Appearance: Alert, cooperative, no distress  HEENT: Head normocephalic, atraumatic, without obvious abnormality  Heart: Normal rate and rhythm  Lungs: Non-labored breathing  No respiratory distress  Abdomen: Without distension  Psychiatric: AAOx3  Lower Extremity:    Vascular:   DP: Right: 2+ Left: 2+  PT: Right: 2+ Left: 2+  CRT < 3 seconds at the digits  +1/4 edema noted at bilateral lower extremities  Pedal hair is absent  Skin temperature is warm to the left foot in comparison to the right    Musculoskeletal:  MMT is 5/5 in all muscle compartments bilaterally  ROM at the 1st MPJ and ankle joint are decreased bilaterally with the leg extended  Pain on palpation of the left foot, ankle, leg  No gross deformities noted       Dermatological:  Lower extremity wound(s) as noted below:    Wound #: 1  Location: left lateral 5th MTPJ  Length 3 0cm: Width 3 0cm: Depth 0 4cm:   Deepest Tissue Noted in Base: subcutaneous tissue  Probe to Bone: No  Peripheral Skin Description: Attached  Granulation: 100% Fibrotic Tissue: 0% Necrotic Tissue: 0%   Drainage Amount: minimal, serous  Signs of Infection: Yes      Eschar on dorsal left 2nd digit measures approximately 0 4 x 0 4 x unknown cm  Eschar is well adhered with no drainage or local signs of infection noted  Neurological:  Gross sensation is diminished  Light touch is diminished  Protective sensation is diminished  Additional data:     Lab Results: I have personally reviewed pertinent labs including:    Results from last 7 days   Lab Units 05/31/22  1045   WBC Thousand/uL 7 32   HEMOGLOBIN g/dL 12 7   HEMATOCRIT % 38 6   PLATELETS Thousands/uL 246   NEUTROS PCT % 70   LYMPHS PCT % 16   MONOS PCT % 10   EOS PCT % 3     Results from last 7 days   Lab Units 05/31/22  1045   POTASSIUM mmol/L 5 2   CHLORIDE mmol/L 98*   CO2 mmol/L 24   BUN mg/dL 49*   CREATININE mg/dL 2 29*   CALCIUM mg/dL 9 3   ALK PHOS U/L 71   ALT U/L 23   AST U/L 16     Results from last 7 days   Lab Units 05/31/22  1045   INR  1 14       Cultures: I have personally reviewed pertinent cultures including:              Imaging: I have personally reviewed pertinent reports in PACS  EKG, Pathology, and Other Studies: I have personally reviewed pertinent reports  Time Spent for Care: 30 minutes  More than 50% of total time spent on counseling and coordination of care as described above  ** Please Note: Portions of the record may have been created with voice recognition software  Occasional wrong word or "sound a like" substitutions may have occurred due to the inherent limitations of voice recognition software  Read the chart carefully and recognize, using context, where substitutions have occurred   **

## 2022-05-31 NOTE — NURSING NOTE
Pt states he wont take insulin and told Dr  To not even add into his meds   And he will only take coreg if it's 12 hrs apart

## 2022-05-31 NOTE — H&P
[address]  H&P- Srinath Burden 1965, 59 y o  male MRN: [id number]  Unit/Bed#: ED 29 Encounter: [id number]  Primary Care Provider: Rosie Joseph MD   Date and time admitted to hospital: 5/31/2022  9:47 AM    Addendum to preop clearance:  EKG shows no acute ischemia  Patient is stable for surgery tomorrow      * Diabetic ulcer of left foot [facility])  Assessment & Plan  Lab Results   Component Value Date    HGBA1C 6 6 (H) 04/26/2022       No results for input(s): POCGLU in the last 72 hours  Blood Sugar Average: Last 72 hrs:  · Patient was recently admitted for left foot ulcer, status post washout by Podiatry and wound VAC was placed  He was treated with IV antibiotics and then discharged on Levaquin to be finished on 05/07/2022  · Patient noticed increased drainage and redness around the wound  · He was seen by Podiatry as outpatient, x-ray was done which showed cortical erosion of 5th metatarsal head adjacent to wound concern for osteomyelitis of 5th metatarsal head  · On admission procalcitonin negative, lactic acid negative  · Afebrile, no white blood cell count  · ESR CRP elevated  · X-ray of the left foot pending  · MRI ordered by Podiatry  · Podiatry consult  · Infectious Disease consult, will await recommendations from Infectious Disease prior to starting antibiotics    Acute kidney injury superimposed on chronic kidney disease [facility])  Assessment & Plan  Lab Results   Component Value Date    EGFR 30 05/31/2022    EGFR 51 05/22/2022    EGFR 41 05/02/2022    CREATININE 2 29 (H) 05/31/2022    CREATININE 1 48 (H) 05/22/2022    CREATININE 1 77 (H) 05/02/2022   · Baseline creatinine from 1 4-1 6  · Creatinine on admission 2  Twenty-nine  · Patient reports poor p o   Intake, also infection contributing  · Received 1 L of normal saline in the ED  · Continue plasma 75 cc/hour  · Avoid hypotension and nephrotoxins  · BMP tomorrow    Pre-operative clearance  Assessment & Plan  · This patient is a 40-year-old male with a past medical history of hypertension, type 2 diabetes non insulin dependent, CKD stage 3 who presented with left foot wound  · Possible osteomyelitis  · Patient denies any history of heart disease, heart attack, stents, open-heart surgery, denies history of lung disease or stroke  · Denies chest pain palpitation shortness of breath at rest or exertion  · Check EKG and if no acute changes, patient is stable for podiatry procedure    H/O eye surgery  Assessment & Plan  · Continue eyedrops    Class 2 severe obesity with serious comorbidity and body mass index (BMI) of 37 0 to 37 9 in adult [facility])  Assessment & Plan  · Lifestyle modification    Diabetes mellitus ([facility] [address] )  Assessment & Plan  Lab Results   Component Value Date    HGBA1C 6 6 (H) 04/26/2022       No results for input(s): POCGLU in the last 72 hours  Blood Sugar Average: Last 72 hrs:  ·  on Jardiance and Ozempic  · Start sliding scale while inpatient  · Diabetic diet    Essential hypertension  Assessment & Plan  · Continue Coreg 25 mg b i d   · Continue lisinopril/HCTZ 20/12 5 b i d       VTE Pharmacologic Prophylaxis: VTE Score: 3 Moderate Risk (Score 3-4) - Pharmacological DVT Prophylaxis Ordered: heparin  Code Status: Level 3 - DNAR and DNI patient  Discussion with family: Patient declined call to   Anticipated Length of Stay: Patient will be admitted on an inpatient basis with an anticipated length of stay of greater than 2 midnights secondary to Left foot wound  Total Time for Visit, including Counseling / Coordination of Care: 45 minutes Greater than 50% of this total time spent on direct patient counseling and coordination of care  Chief Complaint:  Left foot wound    History of Present Illness:  Tanesha Gilliland is a 59 y o  male with a PMH of hypertension, diabetes, obesity, left foot wound who presents with left foot wound    Patient was recently admitted, underwent washout by Podiatry, wound VAC was placed  Patient was discharged on Levaquin to be finished on 05/07 since wound culture was growing Pseudomonas  Patient was seen as outpatient, x-ray was done which showed possible osteomyelitis  Patient reports increased redness and drainage from the wound for the past few days  Denies fever chills  Reports increased pain  Review of Systems:  Review of Systems   Constitutional: Negative for activity change, chills and fever  HENT: Negative  Eyes: Negative  Respiratory: Negative for cough, chest tightness and wheezing  Cardiovascular: Negative for chest pain, palpitations and leg swelling  Gastrointestinal: Negative  Endocrine: Negative  Genitourinary: Negative for dysuria  Musculoskeletal:        Left foot pain   Skin:        Left foot wound, drainage, redness   Neurological: Negative  Hematological: Negative  Psychiatric/Behavioral: Negative  Past Medical and Surgical History:   Past Medical History:   Diagnosis Date    H/O eye surgery     High blood sugar     Hypertension        Past Surgical History:   Procedure Laterality Date    HERNIA REPAIR      KIDNEY SURGERY      MOUTH SURGERY      WOUND DEBRIDEMENT Left 4/28/2022    Procedure: Left foot washout;  Surgeon: Carla Vallejo DPM;  Location: [facility] OR;  Service: Podiatry       Meds/Allergies:  Prior to Admission medications    Medication Sig Start Date End Date Taking?  Authorizing Provider   Alpha-Lipoic Acid 600 MG CAPS Take 600 mg by mouth 2 (two) times a day      Historical Provider, MD   Apple Cider Vinegar 300 MG TABS Take 300 mg by mouth daily      Historical Provider, MD   Ascorbic Acid (vitamin C) 1000 MG tablet Take 1,000 mg by mouth 2 (two) times a day    Historical Provider, MD   BENFOTIAMINE PO Take 300 mg by mouth 2 (two) times a day      Historical Provider, MD   beta carotene 30 MG capsule Take 30 mg by mouth 2 (two) times a day      Historical Provider, MD   Blood Glucose Monitoring Suppl (OneTouch Verio Sync System) w/Device KIT Use daily Test sugar daily in the morning  3/1/21   Estle Hard, CRNP   carvedilol (COREG) 25 mg tablet Take 1 tablet (25 mg total) by mouth 2 (two) times a day with meals 6/14/21   Estle Hard, CRNP   Chromium Picolinate 500 MCG TABS Take by mouth daily     Historical Provider, MD   CVS CINNAMON PO Take by mouth 2 (two) times a day     Historical Provider, MD   Empagliflozin (Jardiance) 25 MG TABS Take 1 tablet (25 mg total) by mouth daily 11/2/21   Zach Morrow MD   Garlic 1681 MG CAPS Take by mouth 2 (two) times a day     Historical Provider, MD   gentamicin (GARAMYCIN) 0 3 % ophthalmic solution 1 drop  in the morning and 1 drop before bedtime  Right eye   Historical Provider, MD   glucose blood (Contour Next Test) test strip Use to check blood sugar once a day 3/16/22   Zach Morrow MD   glucose blood (OneTouch Verio) test strip Test sugar daily in the morning   3/1/21   Estle Hard, CRNP   lisinopril-hydrochlorothiazide (PRINZIDE,ZESTORETIC) 20-12 5 MG per tablet Take 1 tablet by mouth 2 (two) times a day 7/14/21   Estle Hard, CRNP   Loteprednol Etabonate 1 % SUSP Apply to eye 2 (two) times a day I drop right eye    Historical Provider, MD   Magnesium (CVS Triple Magnesium Complex) 400 MG CAPS Take by mouth daily    Historical Provider, MD   Misc Natural Products (Blood Sugar Balance) TABS Take by mouth 2 (two) times a day    Historical Provider, MD   Multiple Vitamin (MULTI VITAMIN MENS PO) Take by mouth once    Historical Provider, MD   Potassium 99 MG TABS Take by mouth daily     Historical Provider, MD   Semaglutide,0 25 or 0 5MG/DOS, (Ozempic, 0 25 or 0 5 MG/DOSE,) 2 MG/1 5ML SOPN Inject 0 25 mg under the skin once a week 11/2/21   Zach Morrow MD   traMADol (Ultram) 50 mg tablet Take 1 tablet (50 mg total) by mouth every 6 (six) hours as needed for moderate pain for up to 8 doses 5/22/22   Whitney Balbuena MD   Turmeric (QC TUMERIC COMPLEX PO) Take 1,000 mg by mouth once Tumeric /curcimin    Historical Provider, MD   VITAMIN D PO Take by mouth 2 (two) times a day    Historical Provider, MD   vitamin E, tocopherol, 400 units capsule Take 400 Units by mouth daily    Historical Provider, MD   Zinc 50 MG CAPS Take by mouth 2 (two) times a day     Historical Provider, MD     I have reviewed home medications with patient personally  Allergies: Allergies   Allergen Reactions    Cephalexin Hives     Skin peeling  Tolerates penicillins (tolerated unasyn and zosyn)    Metformin GI Intolerance       Social History:  Marital Status: Registered Domestic Partner   Substance Use History:   Social History     Substance and Sexual Activity   Alcohol Use Yes    Comment: ocassional     Social History     Tobacco Use   Smoking Status Never Smoker   Smokeless Tobacco Never Used     Social History     Substance and Sexual Activity   Drug Use Never       Family History:  History reviewed  No pertinent family history  Physical Exam:     Vitals:   Blood Pressure: 117/76 (05/31/22 1059)  Pulse: 97 (05/31/22 1059)  Temperature: 98 9 °F (37 2 °C) (05/31/22 0951)  Temp Source: Oral (05/31/22 0951)  Respirations: 18 (05/31/22 1059)  SpO2: 95 % (05/31/22 1059)    Physical Exam  Constitutional:       General: He is not in acute distress  Appearance: He is obese  HENT:      Head: Atraumatic  Cardiovascular:      Rate and Rhythm: Normal rate and regular rhythm  Heart sounds: No murmur heard  No friction rub  No gallop  Pulmonary:      Effort: Pulmonary effort is normal  No respiratory distress  Breath sounds: Normal breath sounds  No wheezing  Abdominal:      General: Bowel sounds are normal  There is no distension  Palpations: Abdomen is soft  Tenderness: There is no abdominal tenderness  Musculoskeletal:      Cervical back: Neck supple  Comments: Left foot wound   Skin:     General: Skin is warm and dry     Neurological:      General: No focal deficit present  Mental Status: He is alert  Psychiatric:         Mood and Affect: Mood normal           Additional Data:     Lab Results:  Results from last 7 days   Lab Units 05/31/22  1045   WBC Thousand/uL 7 32   HEMOGLOBIN g/dL 12 7   HEMATOCRIT % 38 6   PLATELETS Thousands/uL 246   NEUTROS PCT % 70   LYMPHS PCT % 16   MONOS PCT % 10   EOS PCT % 3     Results from last 7 days   Lab Units 05/31/22  1045   SODIUM mmol/L 134*   POTASSIUM mmol/L 5 2   CHLORIDE mmol/L 98*   CO2 mmol/L 24   BUN mg/dL 49*   CREATININE mg/dL 2 29*   ANION GAP mmol/L 12   CALCIUM mg/dL 9 3   ALBUMIN g/dL 3 4*   TOTAL BILIRUBIN mg/dL 0 29   ALK PHOS U/L 71   ALT U/L 23   AST U/L 16   GLUCOSE RANDOM mg/dL 196*     Results from last 7 days   Lab Units 05/31/22  1045   INR  1 14             Results from last 7 days   Lab Units 05/31/22  1045   LACTIC ACID mmol/L 1 0   PROCALCITONIN ng/ml 0 14       Imaging:   XR foot 3+ views LEFT   ED Interpretation by Bear Matthews PA-C (05/31 1219)   Signs of osteomyelitis of 5th metacarpal seen by me at this time  by Montserrat Beck MD (05/31 1323)      VAS lower limb venous duplex study, unilateral/limited    (Results Pending)   MRI inpatient order    (Results Pending)     XR foot 3+ views LEFT   ED Interpretation by Bear Matthews PA-C (05/31 1219)   Signs of osteomyelitis of 5th metacarpal seen by me at this time  VAS lower limb venous duplex study, unilateral/limited    (Results Pending)   MRI inpatient order    (Results Pending)       EKG and Other Studies Reviewed on Admission:   · EKG: No EKG obtained  ** Please Note: This note has been constructed using a voice recognition system   **

## 2022-05-31 NOTE — ED PROVIDER NOTES
History  Chief Complaint   Patient presents with    Foot Pain     Pt reports L sided foot pain after surgery  Reports pain radiaiting from toes up calf into side of thigh and back worsening last night       57y  o male with PMH of DM and HTN presents to the ER for wound to his left foot  Patient states he has been having ongoing issues with this foot for the last month  He denies taking any antibiotics recently for his wound  He has been taking Tylenol and NSAIDs for pain  He describes his pain as sharp, throbbing and burning  Pain radiates from his wound up his leg  Pain is constant  He denies fever, chills, URI symptoms, chest pain, dyspnea, N/V/D, abdominal pain, weakness or paresthesias  Patient was sent in by his Podiatrist, Dr Davonte Siddiqui  On 4/25, patient was admitted for this wound and was started on IV antibiotics at that time  He had an MRI completed, which showed an abscess without osteomyelitis  Patient had a washout performed and wound vac placed  At that time, he completed a course of Levaquin  He was recently seen on 5/22 for pain and was found to have signs of osteomyelitis on his xray  History provided by:  Patient   used: No        Prior to Admission Medications   Prescriptions Last Dose Informant Patient Reported? Taking? Alpha-Lipoic Acid 600 MG CAPS 5/31/2022 at Unknown time  Yes Yes   Sig: Take 600 mg by mouth 2 (two) times a day     Apple Cider Vinegar 300 MG TABS 5/31/2022 at Unknown time  Yes Yes   Sig: Take 300 mg by mouth daily     Ascorbic Acid (vitamin C) 1000 MG tablet   Yes No   Sig: Take 1,000 mg by mouth 2 (two) times a day   BENFOTIAMINE PO   Yes No   Sig: Take 300 mg by mouth 2 (two) times a day     Blood Glucose Monitoring Suppl (OneTouch Verio Sync System) w/Device KIT  Self No No   Sig: Use daily Test sugar daily in the morning     CVS CINNAMON PO  Self Yes No   Sig: Take by mouth 2 (two) times a day    Chromium Picolinate 500 MCG TABS  Self Yes No   Sig: Take by mouth daily    Empagliflozin (Jardiance) 25 MG TABS   No No   Sig: Take 1 tablet (25 mg total) by mouth daily   Garlic 2110 MG CAPS  Self Yes No   Sig: Take by mouth 2 (two) times a day    Loteprednol Etabonate 1 % SUSP   Yes No   Sig: Apply to eye 2 (two) times a day I drop right eye   Magnesium (CVS Triple Magnesium Complex) 400 MG CAPS   Yes No   Sig: Take by mouth daily   Misc Natural Products (Blood Sugar Balance) TABS   Yes No   Sig: Take by mouth 2 (two) times a day   Multiple Vitamin (MULTI VITAMIN MENS PO)   Yes No   Sig: Take by mouth once   Potassium 99 MG TABS  Self Yes No   Sig: Take by mouth daily    Semaglutide,0 25 or 0 5MG/DOS, (Ozempic, 0 25 or 0 5 MG/DOSE,) 2 MG/1 5ML SOPN   No No   Sig: Inject 0 25 mg under the skin once a week   Turmeric (QC TUMERIC COMPLEX PO)   Yes No   Sig: Take 1,000 mg by mouth once Tumeric /curcimin   VITAMIN D PO   Yes No   Sig: Take by mouth 2 (two) times a day   Zinc 50 MG CAPS  Self Yes No   Sig: Take by mouth 2 (two) times a day    beta carotene 30 MG capsule  Self Yes No   Sig: Take 30 mg by mouth 2 (two) times a day     carvedilol (COREG) 25 mg tablet   No No   Sig: Take 1 tablet (25 mg total) by mouth 2 (two) times a day with meals   gentamicin (GARAMYCIN) 0 3 % ophthalmic solution   Yes No   Si drop  in the morning and 1 drop before bedtime  Right eye     glucose blood (Contour Next Test) test strip   No No   Sig: Use to check blood sugar once a day   glucose blood (OneTouch Verio) test strip  Self No No   Sig: Test sugar daily in the morning     lisinopril-hydrochlorothiazide (PRINZIDE,ZESTORETIC) 20-12 5 MG per tablet   No No   Sig: Take 1 tablet by mouth 2 (two) times a day   traMADol (Ultram) 50 mg tablet   No No   Sig: Take 1 tablet (50 mg total) by mouth every 6 (six) hours as needed for moderate pain for up to 8 doses   vitamin E, tocopherol, 400 units capsule  Self Yes No   Sig: Take 400 Units by mouth daily      Facility-Administered Medications: None       Past Medical History:   Diagnosis Date    H/O eye surgery     High blood sugar     Hypertension        Past Surgical History:   Procedure Laterality Date    HERNIA REPAIR      KIDNEY SURGERY      MOUTH SURGERY      WOUND DEBRIDEMENT Left 4/28/2022    Procedure: Left foot washout;  Surgeon: Cele Del Cid DPM;  Location: [facility] OR;  Service: Podiatry       History reviewed  No pertinent family history  I have reviewed and agree with the history as documented  E-Cigarette/Vaping    E-Cigarette Use Never User      E-Cigarette/Vaping Substances    Nicotine No     THC No     CBD No     Flavoring No     Other No     Unknown No      Social History     Tobacco Use    Smoking status: Never Smoker    Smokeless tobacco: Never Used   Vaping Use    Vaping Use: Never used   Substance Use Topics    Alcohol use: Yes     Comment: ocassional    Drug use: Never       Review of Systems   Constitutional: Negative for activity change, appetite change, chills and fever  HENT: Negative for congestion, drooling, ear discharge, ear pain, facial swelling, rhinorrhea and sore throat  Eyes: Negative for redness  Respiratory: Negative for cough and shortness of breath  Cardiovascular: Negative for chest pain  Gastrointestinal: Negative for abdominal pain, diarrhea, nausea and vomiting  Musculoskeletal: Negative for neck stiffness  Skin: Positive for wound (left foot)  Negative for rash  Allergic/Immunologic: Negative for food allergies  Neurological: Negative for weakness and numbness  Physical Exam  Physical Exam  Vitals and nursing note reviewed  Constitutional:       General: He is not in acute distress  Appearance: He is not toxic-appearing  HENT:      Head: Normocephalic and atraumatic  Eyes:      Conjunctiva/sclera: Conjunctivae normal    Neck:      Trachea: No tracheal deviation  Cardiovascular:      Rate and Rhythm: Normal rate and regular rhythm  Heart sounds: Normal heart sounds, S1 normal and S2 normal  No murmur heard  No friction rub  No gallop  Pulmonary:      Effort: Pulmonary effort is normal  No respiratory distress  Breath sounds: Normal breath sounds  No decreased breath sounds, wheezing, rhonchi or rales  Chest:      Chest wall: No tenderness  Abdominal:      General: There is no distension  Musculoskeletal:      Cervical back: Normal range of motion and neck supple  Left foot: Normal range of motion  No deformity  Feet:    Feet:      Left foot:      Skin integrity: Ulcer, erythema and warmth present  Skin:     General: Skin is warm and dry  Findings: No rash  Neurological:      Mental Status: He is alert  GCS: GCS eye subscore is 4  GCS verbal subscore is 5  GCS motor subscore is 6     Psychiatric:         Mood and Affect: Mood normal              Vital Signs  ED Triage Vitals   Temperature Pulse Respirations Blood Pressure SpO2   05/31/22 0951 05/31/22 0950 05/31/22 0950 05/31/22 0950 05/31/22 0950   98 9 °F (37 2 °C) 99 19 108/65 96 %      Temp Source Heart Rate Source Patient Position - Orthostatic VS BP Location FiO2 (%)   05/31/22 0951 05/31/22 0950 05/31/22 1059 05/31/22 1059 --   Oral Monitor Sitting Right arm       Pain Score       05/31/22 1100       5           Vitals:    05/31/22 0950 05/31/22 1059 05/31/22 1358   BP: 108/65 117/76 115/83   Pulse: 99 97 83   Patient Position - Orthostatic VS:  Sitting          Visual Acuity      ED Medications  Medications   ascorbic acid (VITAMIN C) tablet 1,000 mg (0 mg Oral Hold 5/31/22 1353)   carvedilol (COREG) tablet 25 mg (has no administration in time range)   gentamicin (GARAMYCIN) 0 3 % ophthalmic solution 1 drop (has no administration in time range)   traMADol (ULTRAM) tablet 50 mg (has no administration in time range)   vitamin E (TOCOPHEROL) capsule 400 Units (has no administration in time range)   zinc sulfate (ZINCATE) capsule 220 mg (0 mg Oral Hold 5/31/22 1352)   multi-electrolyte (PLASMALYTE-A/ISOLYTE-S PH 7 4) IV solution (has no administration in time range)   acetaminophen (TYLENOL) tablet 650 mg (has no administration in time range)   heparin (porcine) subcutaneous injection 5,000 Units (has no administration in time range)   oxyCODONE (ROXICODONE) IR tablet 5 mg (has no administration in time range)   ALPRAZolam (XANAX) tablet 1 mg (has no administration in time range)   insulin lispro (HumaLOG) 100 units/mL subcutaneous injection 2-12 Units (has no administration in time range)   lisinopril-hydrochlorothiazide (PRINZIDE 20/12  5) combo dose (has no administration in time range)   chlorhexidine (PERIDEX) 0 12 % oral rinse 15 mL (has no administration in time range)   piperacillin-tazobactam (ZOSYN) 3 375 g in sodium chloride 0 9 % 100 mL IVPB (has no administration in time range)   sodium chloride 0 9 % infusion (has no administration in time range)   sodium chloride 0 9 % bolus 1,000 mL (1,000 mL Intravenous New Bag 5/31/22 1055)   oxyCODONE-acetaminophen (PERCOCET) 5-325 mg per tablet 1 tablet (1 tablet Oral Given 5/31/22 1100)       Diagnostic Studies  Results Reviewed     Procedure Component Value Units Date/Time    Sedimentation rate, automated [[id number]]  (Abnormal) Collected: 05/31/22 1045    Lab Status: Final result Specimen: Blood from Arm, Left Updated: 05/31/22 1152     Sed Rate 61 mm/hour     C-reactive protein [[id number]]  (Abnormal) Collected: 05/31/22 1045    Lab Status: Final result Specimen: Blood from Arm, Left Updated: 05/31/22 1139     CRP 14 6 mg/L     Procalcitonin [[id number]]  (Normal) Collected: 05/31/22 1045    Lab Status: Final result Specimen: Blood from Arm, Left Updated: 05/31/22 1133     Procalcitonin 0 14 ng/ml     CBC and differential [[id number]] Collected: 05/31/22 1045    Lab Status: Final result Specimen: Blood from Arm, Left Updated: 05/31/22 1133     WBC 7 32 Thousand/uL      RBC 4 24 Million/uL      Hemoglobin 12 7 g/dL      Hematocrit 38 6 %      MCV 91 fL      MCH 30 0 pg      MCHC 32 9 g/dL      RDW 12 5 %      MPV 9 7 fL      Platelets 489 Thousands/uL      nRBC 0 /100 WBCs      Neutrophils Relative 70 %      Immat GRANS % 0 %      Lymphocytes Relative 16 %      Monocytes Relative 10 %      Eosinophils Relative 3 %      Basophils Relative 1 %      Neutrophils Absolute 5 13 Thousands/µL      Immature Grans Absolute 0 03 Thousand/uL      Lymphocytes Absolute 1 16 Thousands/µL      Monocytes Absolute 0 71 Thousand/µL      Eosinophils Absolute 0 24 Thousand/µL      Basophils Absolute 0 05 Thousands/µL     Comprehensive metabolic panel [[id number]]  (Abnormal) Collected: 05/31/22 1045    Lab Status: Final result Specimen: Blood from Arm, Left Updated: 05/31/22 1131     Sodium 134 mmol/L      Potassium 5 2 mmol/L      Chloride 98 mmol/L      CO2 24 mmol/L      ANION GAP 12 mmol/L      BUN 49 mg/dL      Creatinine 2 29 mg/dL      Glucose 196 mg/dL      Calcium 9 3 mg/dL      Corrected Calcium 9 8 mg/dL      AST 16 U/L      ALT 23 U/L      Alkaline Phosphatase 71 U/L      Total Protein 7 9 g/dL      Albumin 3 4 g/dL      Total Bilirubin 0 29 mg/dL      eGFR 30 ml/min/1 73sq m     Narrative:      [facility] guidelines for Chronic Kidney Disease (CKD):     Stage 1 with normal or high GFR (GFR > 90 mL/min/1 73 square meters)    Stage 2 Mild CKD (GFR = 60-89 mL/min/1 73 square meters)    Stage 3A Moderate CKD (GFR = 45-59 mL/min/1 73 square meters)    Stage 3B Moderate CKD (GFR = 30-44 mL/min/1 73 square meters)    Stage 4 Severe CKD (GFR = 15-29 mL/min/1 73 square meters)    Stage 5 End Stage CKD (GFR <15 mL/min/1 73 square meters)  Note: GFR calculation is accurate only with a steady state creatinine    Lactic acid [[id number]]  (Normal) Collected: 05/31/22 1045    Lab Status: Final result Specimen: Blood from Arm, Left Updated: 05/31/22 1122     LACTIC ACID 1 0 mmol/L     Narrative:      Result may be elevated if tourniquet was used during collection  Alyssa Kendall [[id number]]  (Normal) Collected: 05/31/22 1045    Lab Status: Final result Specimen: Blood from Arm, Left Updated: 05/31/22 1113     Protime 14 3 seconds      INR 1 14    APTT [[id number]]  (Normal) Collected: 05/31/22 1045    Lab Status: Final result Specimen: Blood from Arm, Left Updated: 05/31/22 1113     PTT 30 seconds     Blood culture #1 [[id number]] Collected: 05/31/22 1045    Lab Status: In process Specimen: Blood from Arm, Left Updated: 05/31/22 1059    Blood culture #2 [[id number]] Collected: 05/31/22 1054    Lab Status: In process Specimen: Blood from Arm, Right Updated: 05/31/22 1059                 XR foot 3+ views LEFT   ED Interpretation by Mahendra Huitron PA-C (05/31 1219)   Signs of osteomyelitis of 5th metacarpal seen by me at this time  Final Result by Fatmata Pierre MD (05/31 1326)      Bony destruction in the head of the 5th metatarsal with a pathologic fracture in keeping with osteomyelitis which appears worse compared to the most recent study  There is questionable erosion of the proximal, lateral aspect of the proximal phalanx of    the 5th toe  This raises concern for the presence of septic arthritis  Workstation performed: [id number]         VAS lower limb venous duplex study, unilateral/limited    (Results Pending)   MRI inpatient order    (Results Pending)              Procedures  Procedures         ED Course  ED Course as of 05/31/22 1426   Tue May 31, 2022   1047 [place] text sent to Podiatry   04 [phone] Spoke with Dr Lisa Petersen from Podiatry  Podiatry will see patient  1203 Creatinine(!): 2 29  VIVIANA   46 [place] text sent to [facility] for admission  Sudhakar Meza 57 with Dr Jaret Umanzor from [facility] who is agreeable to admission  1319 VAS lower limb venous duplex study, unilateral/limited  Per US technician, patient is negative for DVT                              Initial Sepsis Screening     Row Name 05/31/22 1214 Is the patient's history suggestive of a new or worsening infection? Yes (Proceed)  -AR        Suspected source of infection soft tissue  -AR        Are two or more of the following signs & symptoms of infection both present and new to the patient? No  -AR        Indicate SIRS criteria Tachycardia > 90 bpm  -AR        If the answer is yes to both questions, suspicion of sepsis is present --        If severe sepsis is present AND tissue hypoperfusion perists in the hour after fluid resuscitation or lactate > 4, the patient meets criteria for SEPTIC SHOCK --        Are any of the following organ dysfunction criteria present within 6 hours of suspected infection and SIRS criteria that are NOT considered to be chronic conditions? --        Organ dysfunction --        Date of presentation of severe sepsis --        Time of presentation of severe sepsis --        Tissue hypoperfusion persists in the hour after crystalloid fluid administration, evidenced, by either: --        Was hypotension present within one hour of the conclusion of crystalloid fluid administration? --        Date of presentation of septic shock --        Time of presentation of septic shock --              User Key  (r) = Recorded By, (t) = Taken By, (c) = Cosigned By    [facility] [address]  Name Provider Type    Flores 44, PA-C Physician Assistant                              MDM  Number of Diagnoses or Management Options  Osteomyelitis of right foot [facility]): new and requires workup  Diagnosis management comments: DDX consists of but not limited to: ulcer, osteomyelitis    Will check labs and imaging  49 [facility] text sent to Podiatry    [phone] with Dr Kenroy Friedman from Podiatry  Podiatry will see patient  1203 Creatinine(!): 2 29 - VIVIANA    1217 Spoke with Podiatry  Since patient has an VIVIANA, Podiatry recommends that we admit to [facility]  [place] text sent to [facility] for admission      Sudhakar Meza 57 with Dr Gabby Sung from [facility] who is agreeable to admission  1319 VAS lower limb venous duplex study, unilateral/limited - Per US technician, patient is negative for DVT  Patient stable at time of transfer to [address] [place] [address] care  Amount and/or Complexity of Data Reviewed  Clinical lab tests: ordered and reviewed  Tests in the radiology section of CPT®: ordered and reviewed  Review and summarize past medical records: yes  Discuss the patient with other providers: yes  Independent visualization of images, tracings, or specimens: yes    Patient Progress  Patient progress: stable      Disposition  Final diagnoses:   Osteomyelitis of right foot ([facility] [address] )     Time reflects when diagnosis was documented in both MDM as applicable and the Disposition within this note     Time User Action Codes Description Comment    5/31/2022 12:20 PM Di Damaris ABEBE Add [M86 9] Osteomyelitis of right foot ([address] )     5/31/2022  1:03 PM Katlyn Soto Add [E11 621,  L97 509] Diabetic foot ulcer ([address] )     5/31/2022  1:28 PM Kumar Menon Add [R68 719,  L97 424] Diabetic ulcer of left midfoot associated with type 2 diabetes mellitus, with necrosis of bone ([address] )     5/31/2022  2:20 PM Hughes-Behler, Colleen Add [E11 621,  L97 529] Diabetic ulcer of left foot [facility])       ED Disposition     ED Disposition   Admit    Condition   Stable    Date/Time   Tue May 31, 2022 12:19 PM    Comment   Case was discussed with Dr Laurie Chacko from [address] [place] [address] and the patient's admission status was agreed to be Admission Status: inpatient status to the service of Dr Darlean Boeck              Follow-up Information    None         Current Discharge Medication List      CONTINUE these medications which have NOT CHANGED    Details   Alpha-Lipoic Acid 600 MG CAPS Take 600 mg by mouth 2 (two) times a day        Apple Cider Vinegar 300 MG TABS Take 300 mg by mouth daily        Ascorbic Acid (vitamin C) 1000 MG tablet Take 1,000 mg by mouth 2 (two) times a day      BENFOTIAMINE PO Take 300 mg by mouth 2 (two) times a day beta carotene 30 MG capsule Take 30 mg by mouth 2 (two) times a day        Blood Glucose Monitoring Suppl (OneTouch Verio Sync System) w/Device KIT Use daily Test sugar daily in the morning  Qty: 1 kit, Refills: 0    Associated Diagnoses: Type 2 diabetes, uncontrolled, with neuropathy      carvedilol (COREG) 25 mg tablet Take 1 tablet (25 mg total) by mouth 2 (two) times a day with meals  Qty: 60 tablet, Refills: 1    Associated Diagnoses: Essential hypertension      Chromium Picolinate 500 MCG TABS Take by mouth daily       CVS CINNAMON PO Take by mouth 2 (two) times a day       Empagliflozin (Jardiance) 25 MG TABS Take 1 tablet (25 mg total) by mouth daily  Qty: 90 tablet, Refills: 3    Associated Diagnoses: Type 2 diabetes mellitus with other specified complication, without long-term current use of insulin ([facility])      Garlic 6805 MG CAPS Take by mouth 2 (two) times a day       gentamicin (GARAMYCIN) 0 3 % ophthalmic solution 1 drop  in the morning and 1 drop before bedtime  Right eye   !! glucose blood (Contour Next Test) test strip Use to check blood sugar once a day  Qty: 100 strip, Refills: 3    Comments: Dx E11 [id number]  Associated Diagnoses: Type 2 diabetes mellitus with other specified complication, without long-term current use of insulin ([address] )      ! ! glucose blood (OneTouch Verio) test strip Test sugar daily in the morning    Qty: 100 each, Refills: 3    Associated Diagnoses: Type 2 diabetes, uncontrolled, with neuropathy      lisinopril-hydrochlorothiazide (PRINZIDE,ZESTORETIC) 20-12 5 MG per tablet Take 1 tablet by mouth 2 (two) times a day  Qty: 60 tablet, Refills: 3    Associated Diagnoses: Essential hypertension      Loteprednol Etabonate 1 % SUSP Apply to eye 2 (two) times a day I drop right eye      Magnesium (CVS Triple Magnesium Complex) 400 MG CAPS Take by mouth daily      Misc Natural Products (Blood Sugar Balance) TABS Take by mouth 2 (two) times a day      Multiple Vitamin (MULTI VITAMIN MENS PO) Take by mouth once      Potassium 99 MG TABS Take by mouth daily       Semaglutide,0 25 or 0 5MG/DOS, (Ozempic, 0 25 or 0 5 MG/DOSE,) 2 MG/1 5ML SOPN Inject 0 25 mg under the skin once a week  Qty: 15 mL, Refills: 3    Associated Diagnoses: Type 2 diabetes mellitus with other specified complication, without long-term current use of insulin ([facility])      traMADol (Ultram) 50 mg tablet Take 1 tablet (50 mg total) by mouth every 6 (six) hours as needed for moderate pain for up to 8 doses  Qty: 8 tablet, Refills: 0    Associated Diagnoses: Left leg pain      Turmeric (QC TUMERIC COMPLEX PO) Take 1,000 mg by mouth once Tumeric /curcimin      VITAMIN D PO Take by mouth 2 (two) times a day      vitamin E, tocopherol, 400 units capsule Take 400 Units by mouth daily      Zinc 50 MG CAPS Take by mouth 2 (two) times a day        !! - Potential duplicate medications found  Please discuss with provider  No discharge procedures on file      PDMP Review       Value Time User    PDMP Reviewed  Yes 4/2/2022 12:08 AM Bear Matthews PA-C          ED Provider  Electronically Signed by           Bear Matthews PA-C  05/31/22 6193

## 2022-06-01 ENCOUNTER — ANESTHESIA (INPATIENT)
Dept: PERIOP | Facility: HOSPITAL | Age: 57
DRG: 617 | End: 2022-06-01
Payer: MEDICARE

## 2022-06-01 ENCOUNTER — APPOINTMENT (INPATIENT)
Dept: MRI IMAGING | Facility: HOSPITAL | Age: 57
DRG: 617 | End: 2022-06-01
Payer: MEDICARE

## 2022-06-01 ENCOUNTER — APPOINTMENT (INPATIENT)
Dept: RADIOLOGY | Facility: HOSPITAL | Age: 57
DRG: 617 | End: 2022-06-01
Payer: MEDICARE

## 2022-06-01 LAB
ANION GAP SERPL CALCULATED.3IONS-SCNC: 11 MMOL/L (ref 4–13)
BASOPHILS # BLD AUTO: 0.04 THOUSANDS/ΜL (ref 0–0.1)
BASOPHILS NFR BLD AUTO: 1 % (ref 0–1)
BUN SERPL-MCNC: 54 MG/DL (ref 5–25)
CALCIUM SERPL-MCNC: 9.2 MG/DL (ref 8.3–10.1)
CHLORIDE SERPL-SCNC: 100 MMOL/L (ref 100–108)
CO2 SERPL-SCNC: 24 MMOL/L (ref 21–32)
CREAT SERPL-MCNC: 1.99 MG/DL (ref 0.6–1.3)
EOSINOPHIL # BLD AUTO: 0.22 THOUSAND/ΜL (ref 0–0.61)
EOSINOPHIL NFR BLD AUTO: 4 % (ref 0–6)
ERYTHROCYTE [DISTWIDTH] IN BLOOD BY AUTOMATED COUNT: 12.3 % (ref 11.6–15.1)
GFR SERPL CREATININE-BSD FRML MDRD: 35 ML/MIN/1.73SQ M
GLUCOSE SERPL-MCNC: 144 MG/DL (ref 65–140)
GLUCOSE SERPL-MCNC: 146 MG/DL (ref 65–140)
GLUCOSE SERPL-MCNC: 154 MG/DL (ref 65–140)
GLUCOSE SERPL-MCNC: 159 MG/DL (ref 65–140)
GLUCOSE SERPL-MCNC: 162 MG/DL (ref 65–140)
HCT VFR BLD AUTO: 39.9 % (ref 36.5–49.3)
HGB BLD-MCNC: 13.2 G/DL (ref 12–17)
IMM GRANULOCYTES # BLD AUTO: 0.02 THOUSAND/UL (ref 0–0.2)
IMM GRANULOCYTES NFR BLD AUTO: 0 % (ref 0–2)
LYMPHOCYTES # BLD AUTO: 1.09 THOUSANDS/ΜL (ref 0.6–4.47)
LYMPHOCYTES NFR BLD AUTO: 22 % (ref 14–44)
MCH RBC QN AUTO: 31 PG (ref 26.8–34.3)
MCHC RBC AUTO-ENTMCNC: 33.1 G/DL (ref 31.4–37.4)
MCV RBC AUTO: 94 FL (ref 82–98)
MONOCYTES # BLD AUTO: 0.51 THOUSAND/ΜL (ref 0.17–1.22)
MONOCYTES NFR BLD AUTO: 10 % (ref 4–12)
NEUTROPHILS # BLD AUTO: 3.08 THOUSANDS/ΜL (ref 1.85–7.62)
NEUTS SEG NFR BLD AUTO: 63 % (ref 43–75)
NRBC BLD AUTO-RTO: 0 /100 WBCS
PLATELET # BLD AUTO: 209 THOUSANDS/UL (ref 149–390)
PMV BLD AUTO: 10 FL (ref 8.9–12.7)
POTASSIUM SERPL-SCNC: 4.4 MMOL/L (ref 3.5–5.3)
RBC # BLD AUTO: 4.26 MILLION/UL (ref 3.88–5.62)
SODIUM SERPL-SCNC: 135 MMOL/L (ref 136–145)
WBC # BLD AUTO: 4.96 THOUSAND/UL (ref 4.31–10.16)

## 2022-06-01 PROCEDURE — 87205 SMEAR GRAM STAIN: CPT | Performed by: PODIATRIST

## 2022-06-01 PROCEDURE — 73630 X-RAY EXAM OF FOOT: CPT

## 2022-06-01 PROCEDURE — 88311 DECALCIFY TISSUE: CPT | Performed by: PATHOLOGY

## 2022-06-01 PROCEDURE — 87075 CULTR BACTERIA EXCEPT BLOOD: CPT | Performed by: PODIATRIST

## 2022-06-01 PROCEDURE — G1004 CDSM NDSC: HCPCS

## 2022-06-01 PROCEDURE — 87070 CULTURE OTHR SPECIMN AEROBIC: CPT | Performed by: PODIATRIST

## 2022-06-01 PROCEDURE — 87077 CULTURE AEROBIC IDENTIFY: CPT | Performed by: PODIATRIST

## 2022-06-01 PROCEDURE — 99232 SBSQ HOSP IP/OBS MODERATE 35: CPT | Performed by: INTERNAL MEDICINE

## 2022-06-01 PROCEDURE — 80048 BASIC METABOLIC PNL TOTAL CA: CPT | Performed by: INTERNAL MEDICINE

## 2022-06-01 PROCEDURE — 88305 TISSUE EXAM BY PATHOLOGIST: CPT | Performed by: PATHOLOGY

## 2022-06-01 PROCEDURE — 0Y6N0ZF DETACHMENT AT LEFT FOOT, PARTIAL 5TH RAY, OPEN APPROACH: ICD-10-PCS | Performed by: PODIATRIST

## 2022-06-01 PROCEDURE — 85025 COMPLETE CBC W/AUTO DIFF WBC: CPT | Performed by: INTERNAL MEDICINE

## 2022-06-01 PROCEDURE — 73718 MRI LOWER EXTREMITY W/O DYE: CPT

## 2022-06-01 PROCEDURE — 82948 REAGENT STRIP/BLOOD GLUCOSE: CPT

## 2022-06-01 PROCEDURE — 87186 SC STD MICRODIL/AGAR DIL: CPT | Performed by: PODIATRIST

## 2022-06-01 RX ORDER — HYDROMORPHONE HCL/PF 1 MG/ML
0.5 SYRINGE (ML) INJECTION EVERY 4 HOURS PRN
Status: DISCONTINUED | OUTPATIENT
Start: 2022-06-01 | End: 2022-06-09 | Stop reason: HOSPADM

## 2022-06-01 RX ORDER — MIDAZOLAM HYDROCHLORIDE 2 MG/2ML
INJECTION, SOLUTION INTRAMUSCULAR; INTRAVENOUS AS NEEDED
Status: DISCONTINUED | OUTPATIENT
Start: 2022-06-01 | End: 2022-06-01

## 2022-06-01 RX ORDER — SODIUM CHLORIDE, SODIUM LACTATE, POTASSIUM CHLORIDE, CALCIUM CHLORIDE 600; 310; 30; 20 MG/100ML; MG/100ML; MG/100ML; MG/100ML
INJECTION, SOLUTION INTRAVENOUS CONTINUOUS PRN
Status: DISCONTINUED | OUTPATIENT
Start: 2022-06-01 | End: 2022-06-01

## 2022-06-01 RX ORDER — OXYCODONE HYDROCHLORIDE 5 MG/1
5 TABLET ORAL EVERY 6 HOURS PRN
Status: DISCONTINUED | OUTPATIENT
Start: 2022-06-01 | End: 2022-06-02

## 2022-06-01 RX ORDER — PREDNISOLONE ACETATE 10 MG/ML
1 SUSPENSION/ DROPS OPHTHALMIC 3 TIMES DAILY
Status: DISCONTINUED | OUTPATIENT
Start: 2022-06-01 | End: 2022-06-02

## 2022-06-01 RX ORDER — LIDOCAINE HYDROCHLORIDE 20 MG/ML
INJECTION, SOLUTION EPIDURAL; INFILTRATION; INTRACAUDAL; PERINEURAL AS NEEDED
Status: DISCONTINUED | OUTPATIENT
Start: 2022-06-01 | End: 2022-06-01

## 2022-06-01 RX ORDER — MAGNESIUM HYDROXIDE 1200 MG/15ML
LIQUID ORAL AS NEEDED
Status: DISCONTINUED | OUTPATIENT
Start: 2022-06-01 | End: 2022-06-01 | Stop reason: HOSPADM

## 2022-06-01 RX ORDER — EPHEDRINE SULFATE 50 MG/ML
INJECTION INTRAVENOUS AS NEEDED
Status: DISCONTINUED | OUTPATIENT
Start: 2022-06-01 | End: 2022-06-01

## 2022-06-01 RX ORDER — GLYCOPYRROLATE 0.2 MG/ML
INJECTION INTRAMUSCULAR; INTRAVENOUS AS NEEDED
Status: DISCONTINUED | OUTPATIENT
Start: 2022-06-01 | End: 2022-06-01

## 2022-06-01 RX ORDER — FENTANYL CITRATE 50 UG/ML
INJECTION, SOLUTION INTRAMUSCULAR; INTRAVENOUS AS NEEDED
Status: DISCONTINUED | OUTPATIENT
Start: 2022-06-01 | End: 2022-06-01

## 2022-06-01 RX ORDER — FENTANYL CITRATE/PF 50 MCG/ML
50 SYRINGE (ML) INJECTION
Status: DISCONTINUED | OUTPATIENT
Start: 2022-06-01 | End: 2022-06-01 | Stop reason: HOSPADM

## 2022-06-01 RX ORDER — SODIUM CHLORIDE 9 MG/ML
75 INJECTION, SOLUTION INTRAVENOUS CONTINUOUS
Status: DISCONTINUED | OUTPATIENT
Start: 2022-06-01 | End: 2022-06-04

## 2022-06-01 RX ORDER — PROPOFOL 10 MG/ML
INJECTION, EMULSION INTRAVENOUS AS NEEDED
Status: DISCONTINUED | OUTPATIENT
Start: 2022-06-01 | End: 2022-06-01

## 2022-06-01 RX ORDER — LIDOCAINE HYDROCHLORIDE 10 MG/ML
INJECTION, SOLUTION EPIDURAL; INFILTRATION; INTRACAUDAL; PERINEURAL AS NEEDED
Status: DISCONTINUED | OUTPATIENT
Start: 2022-06-01 | End: 2022-06-01 | Stop reason: HOSPADM

## 2022-06-01 RX ORDER — CYCLOBENZAPRINE HCL 10 MG
5 TABLET ORAL 2 TIMES DAILY PRN
Status: DISCONTINUED | OUTPATIENT
Start: 2022-06-01 | End: 2022-06-09 | Stop reason: HOSPADM

## 2022-06-01 RX ORDER — ONDANSETRON 2 MG/ML
INJECTION INTRAMUSCULAR; INTRAVENOUS AS NEEDED
Status: DISCONTINUED | OUTPATIENT
Start: 2022-06-01 | End: 2022-06-01

## 2022-06-01 RX ORDER — DEXTROSE AND SODIUM CHLORIDE 5; .45 G/100ML; G/100ML
75 INJECTION, SOLUTION INTRAVENOUS CONTINUOUS
Status: DISCONTINUED | OUTPATIENT
Start: 2022-06-01 | End: 2022-06-01

## 2022-06-01 RX ORDER — BUPIVACAINE HYDROCHLORIDE 5 MG/ML
INJECTION, SOLUTION PERINEURAL AS NEEDED
Status: DISCONTINUED | OUTPATIENT
Start: 2022-06-01 | End: 2022-06-01 | Stop reason: HOSPADM

## 2022-06-01 RX ADMIN — SODIUM CHLORIDE, SODIUM LACTATE, POTASSIUM CHLORIDE, AND CALCIUM CHLORIDE: .6; .31; .03; .02 INJECTION, SOLUTION INTRAVENOUS at 19:45

## 2022-06-01 RX ADMIN — EPHEDRINE SULFATE 10 MG: 50 INJECTION, SOLUTION INTRAVENOUS at 18:29

## 2022-06-01 RX ADMIN — CARVEDILOL 25 MG: 25 TABLET, FILM COATED ORAL at 15:43

## 2022-06-01 RX ADMIN — EPHEDRINE SULFATE 10 MG: 50 INJECTION, SOLUTION INTRAVENOUS at 18:13

## 2022-06-01 RX ADMIN — MIDAZOLAM HYDROCHLORIDE 2 MG: 1 INJECTION, SOLUTION INTRAMUSCULAR; INTRAVENOUS at 17:40

## 2022-06-01 RX ADMIN — GENTAMICIN SULFATE 1 DROP: 3 SOLUTION OPHTHALMIC at 08:47

## 2022-06-01 RX ADMIN — OXYCODONE HYDROCHLORIDE AND ACETAMINOPHEN 1000 MG: 500 TABLET ORAL at 22:02

## 2022-06-01 RX ADMIN — ZINC SULFATE 220 MG (50 MG) CAPSULE 220 MG: CAPSULE at 08:46

## 2022-06-01 RX ADMIN — OXYCODONE HYDROCHLORIDE AND ACETAMINOPHEN 1000 MG: 500 TABLET ORAL at 08:44

## 2022-06-01 RX ADMIN — PROPOFOL 200 MG: 10 INJECTION, EMULSION INTRAVENOUS at 17:50

## 2022-06-01 RX ADMIN — SODIUM CHLORIDE 75 ML/HR: 0.9 INJECTION, SOLUTION INTRAVENOUS at 22:24

## 2022-06-01 RX ADMIN — GLYCOPYRROLATE 0.2 MG: 0.4 INJECTION INTRAMUSCULAR; INTRAVENOUS at 18:13

## 2022-06-01 RX ADMIN — HYDROCHLOROTHIAZIDE: 12.5 TABLET ORAL at 08:43

## 2022-06-01 RX ADMIN — CARVEDILOL 25 MG: 25 TABLET, FILM COATED ORAL at 08:44

## 2022-06-01 RX ADMIN — FENTANYL CITRATE 50 MCG: 50 INJECTION, SOLUTION INTRAMUSCULAR; INTRAVENOUS at 18:01

## 2022-06-01 RX ADMIN — TRAMADOL HYDROCHLORIDE 50 MG: 50 TABLET, COATED ORAL at 10:10

## 2022-06-01 RX ADMIN — OXYCODONE HYDROCHLORIDE 5 MG: 5 TABLET ORAL at 15:43

## 2022-06-01 RX ADMIN — HYDROMORPHONE HYDROCHLORIDE 0.5 MG: 1 INJECTION, SOLUTION INTRAMUSCULAR; INTRAVENOUS; SUBCUTANEOUS at 22:21

## 2022-06-01 RX ADMIN — EPHEDRINE SULFATE 10 MG: 50 INJECTION, SOLUTION INTRAVENOUS at 18:09

## 2022-06-01 RX ADMIN — CYCLOBENZAPRINE HYDROCHLORIDE 5 MG: 10 TABLET, FILM COATED ORAL at 22:20

## 2022-06-01 RX ADMIN — PREDNISOLONE ACETATE 1 DROP: 10 SUSPENSION/ DROPS OPHTHALMIC at 13:13

## 2022-06-01 RX ADMIN — FENTANYL CITRATE 50 MCG: 50 INJECTION, SOLUTION INTRAMUSCULAR; INTRAVENOUS at 17:50

## 2022-06-01 RX ADMIN — ONDANSETRON 4 MG: 2 INJECTION INTRAMUSCULAR; INTRAVENOUS at 18:46

## 2022-06-01 RX ADMIN — SODIUM CHLORIDE, SODIUM LACTATE, POTASSIUM CHLORIDE, AND CALCIUM CHLORIDE: .6; .31; .03; .02 INJECTION, SOLUTION INTRAVENOUS at 18:10

## 2022-06-01 RX ADMIN — OXYCODONE HYDROCHLORIDE 5 MG: 5 TABLET ORAL at 03:53

## 2022-06-01 RX ADMIN — EPHEDRINE SULFATE 10 MG: 50 INJECTION, SOLUTION INTRAVENOUS at 18:06

## 2022-06-01 RX ADMIN — EPHEDRINE SULFATE 10 MG: 50 INJECTION, SOLUTION INTRAVENOUS at 18:21

## 2022-06-01 RX ADMIN — DEXTROSE AND SODIUM CHLORIDE 75 ML/HR: 5; .45 INJECTION, SOLUTION INTRAVENOUS at 12:01

## 2022-06-01 RX ADMIN — ACETAMINOPHEN 650 MG: 325 TABLET, FILM COATED ORAL at 10:10

## 2022-06-01 RX ADMIN — SODIUM CHLORIDE, SODIUM GLUCONATE, SODIUM ACETATE, POTASSIUM CHLORIDE, MAGNESIUM CHLORIDE, SODIUM PHOSPHATE, DIBASIC, AND POTASSIUM PHOSPHATE 75 ML/HR: .53; .5; .37; .037; .03; .012; .00082 INJECTION, SOLUTION INTRAVENOUS at 03:47

## 2022-06-01 RX ADMIN — LIDOCAINE HYDROCHLORIDE 50 MG: 20 INJECTION, SOLUTION EPIDURAL; INFILTRATION; INTRACAUDAL at 17:50

## 2022-06-01 RX ADMIN — HYDROCHLOROTHIAZIDE: 12.5 TABLET ORAL at 22:04

## 2022-06-01 RX ADMIN — Medication 400 UNITS: at 08:47

## 2022-06-01 RX ADMIN — PIPERACILLIN SODIUM AND TAZOBACTAM SODIUM 3.38 G: 36; 4.5 INJECTION, POWDER, LYOPHILIZED, FOR SOLUTION INTRAVENOUS at 18:11

## 2022-06-01 NOTE — PROGRESS NOTES
[facility] Podiatry - Progress Note  Patient: Anita Mcmullen 59 y o  male   MRN: [id number]  PCP: Kandice Darnell MD  Unit/Bed#: E5 -47 Encounter: [id number]  Date Of Visit: 22    ASSESSMENT:    Anita Mcmullen is a 59 y o  male with:    1  Diabetic ulceration left lateral 5th MTPJ with underlying osteomyelitis - [place] 3  2  Dorsal 2nd digit eschar, left foot  3  T2DM  4  VIVIANA on CKD2  5  Lumbar radiculopathy        PLAN:    · Going to the OR with Dr Edna Ballesteros today for left partial 5th ray resection  · Confirms npo since after midnight  · Appreciate ID antibiotic recommendation      Weight bearing status: WBAT to L foot  DVT prophylaxis: subq heparin  Antibiotics: zosyn    Disposition: Patient will require continued inpatient stay for the above    SUBJECTIVE:     The patient was seen, evaluated, and assessed at bedside today  The patient was awake, alert, and in no acute distress  No acute events overnight  The patient reports he has a worsening headache and that he will be difficult to work with when he is hungry  Patient denies N/V/F/chills/SOB/CP  OBJECTIVE:     Vitals:   /76   Pulse 80   Temp 97 5 °F (36 4 °C)   Resp 18   Ht 6' (1 829 m)   SpO2 94%   BMI 35 80 kg/m²     Temp (24hrs), Av 9 °F (36 6 °C), Min:97 5 °F (36 4 °C), Max:98 3 °F (36 8 °C)      Physical Exam :     General:  Alert, cooperative, and in no distress  Lower extremity exam:  Dressings left CDI no calf pain        Additional Data:     Labs:    Results from last 7 days   Lab Units 22  0451   WBC Thousand/uL 4 96   HEMOGLOBIN g/dL 13 2   HEMATOCRIT % 39 9   PLATELETS Thousands/uL 209   NEUTROS PCT % 63   LYMPHS PCT % 22   MONOS PCT % 10   EOS PCT % 4     Results from last 7 days   Lab Units 22  0451 22  1045   POTASSIUM mmol/L 4 4 5 2   CHLORIDE mmol/L 100 98*   CO2 mmol/L 24 24   BUN mg/dL 54* 49*   CREATININE mg/dL 1 99* 2 29*   CALCIUM mg/dL 9 2 9 3   ALK PHOS U/L  --  71   ALT U/L  --  23   AST U/L  -- 16     Results from last 7 days   Lab Units 05/31/22  1045   INR  1 14       * I Have Reviewed All Lab Data Listed Above  Recent Cultures (last 7 days):     Results from last 7 days   Lab Units 05/31/22  1054 05/31/22  1045   BLOOD CULTURE  Received in Microbiology Lab  Culture in Progress  Received in Microbiology Lab  Culture in Progress  Imaging: I have personally reviewed pertinent films in PACS  EKG, Pathology, and Other Studies: I have personally reviewed pertinent reports  ** Please Note: Portions of the record may have been created with voice recognition software  Occasional wrong word or "sound a like" substitutions may have occurred due to the inherent limitations of voice recognition software  Read the chart carefully and recognize, using context, where substitutions have occurred   **

## 2022-06-01 NOTE — PHYSICAL THERAPY NOTE
PHYSICAL THERAPY NOTE          Patient Name: Deidre Vazquez  UDDNOMELISSA Date: 6/1/2022    PT Orders received, EMR reviewed  PT cx for today due to pt for OR today for L partial 5th ray resection  Will follow up and complete eval as appropriate s/p procedure      Lary Granados Page, PT

## 2022-06-01 NOTE — OP NOTE
OPERATIVE REPORT - Podiatry  PATIENT NAME: Juan David Rosas    :  1965  MRN: [id number]  Pt Location: [facility] OR ROOM 07    SURGERY DATE: 2022    Surgeon(s) and Role:     * Alyce Rodriguez DPM - Primary     * Muriel Dorantes DPM - Assisting    Pre-op Diagnosis:  Diabetic ulcer of left midfoot associated with type 2 diabetes mellitus, with necrosis of bone ([facility] ) [N39 044, L97 424]    Post-Op Diagnosis Codes:     * Diabetic ulcer of left midfoot associated with type 2 diabetes mellitus, with necrosis of bone ([facility] ) [E11 621, L97 424]    Procedure(s) (LRB):  RAY RESECTION FOOT (Left)    Specimen(s):  ID Type Source Tests Collected by Time Destination   1 : Left Foot 5th Ray Tissue Toe, Left TISSUE EXAM Alyce Rodriguez DPM 2022    A : Left Foot Wound 5th Ray Tissue Other ANAEROBIC CULTURE AND GRAM STAIN, CULTURE, TISSUE AND GRAM STAIN Alyce Rodriguez DPM 2022        Estimated Blood Loss:   Minimal    Drains:  * No LDAs found *    Anesthesia Type:   Choice with 10 ml of 1% Lidocaine and 0 5% Bupivacaine in a 1:1 mixture    Hemostasis:  Direct compression    Materials:  * No implants in log *      Injectables:  None    Operative Findings:  Consistent with Diagnosis    Complications:   None    Procedure and Technique:     Under mild sedation, the patient was brought into the operating room and placed on the operating room table in the supine position  IV sedation was achieved by anesthesia team and a universal timeout was performed where all parties are in agreement of correct patient, correct procedure and correct site  A reverse abad block was performed consisting of 10 ml of local anesthetic  The foot was then prepped and draped in the usual aseptic manner  A raquet type incision was drawn out over the 5th ray  Utilizing a #15 blade a full thickness incision was made down to bone  The 5th ray was then sharply dissected out to isolate the metatarsal shaft   Utilizing a key elevator soft tissues were freed from the metatarsal  A sagittal saw was then used to make the 5th metatarsal amputation osteotomy  The metatarsal was cut in a dorsal distal to plantar proximal fashion  The 5th toe was then removed from the table and passed off  Any residual prominences were removed utilizing a rongeur  The remaining wound bed was then inspected  No remaining purulent sinus tracts were visualized  Any necrotic or nonviable soft tissues were sharply excised from the wound utilizing metzenbaum scissors  Any residual exposed tendons were excised proximally to prevent any infection from tracking proximally  Bones proximal to the amputation site was noted to be of hard viable quality  The remaining surgical wound was red granular with adequate bleeding noted  The surgical incision was irrigated with copious amounts of normal sterile saline  Subcutaneous closure was obtained utilizing vicryl  Skin edges were reapproximated and closure was obtained utilizing nylon  The foot was then cleansed and dried  The incision site was dressed with xeroform, gauze  This was then covered with a Tanisha and an ACE wrap  The patient tolerated the procedure and anesthesia well without immediate complications and transferred to PACU with vital signs stable  As with many limb salvage procedures, we contemplate the possibility of performing further stages to this procedure  Procedures may include debridements, delayed closure, plastic surgery techniques, or more proximal amputations  This procedure may be considered part of a multi-staged limb salvage treatment plan  Dr No Mckeon was present during the entire procedure and participated in all key aspects  Patient is to be NWB to LLE  SIGNATURE: Piedad Pineda DPM  DATE: June 1, 2022  TIME: 7:06 PM      Portions of the record may have been created with voice recognition software   Occasional wrong word or "sound a like" substitutions may have occurred due to the inherent limitations of voice recognition software  Read the chart carefully and recognize, using context, where substitutions have occurred

## 2022-06-01 NOTE — ASSESSMENT & PLAN NOTE
Lab Results   Component Value Date    EGFR 35 06/01/2022    EGFR 30 05/31/2022    EGFR 51 05/22/2022    CREATININE 1 99 (H) 06/01/2022    CREATININE 2 29 (H) 05/31/2022    CREATININE 1 48 (H) 05/22/2022   · Baseline creatinine from 1 4-1 6  · Creatinine on admission 2 29  · Patient reports poor p o   Intake, also infection contributing  · Creatinine today 1 99, improved with IV fluid  · Continue IV fluids  · Avoid hypotension and nephrotoxins  · BMP tomorrow

## 2022-06-01 NOTE — ASSESSMENT & PLAN NOTE
Lab Results   Component Value Date    HGBA1C 6 6 (H) 04/26/2022       Recent Labs     05/31/22  1613 05/31/22  2123 06/01/22  0751 06/01/22  1209   POCGLU 163* 168* 146* 154*       Blood Sugar Average: Last 72 hrs:  · (P) 157 75 on Jardiance and Ozempic  · Started sliding scale while inpatient, patient refusing insulin  Would like to be started back on Jardiance    Explained to him insulin is the best way to manage blood glucose when he is NPO and in hospital  · Discussed the importance of keeping blood sugar under control for good wound healing, still refusing  · Diabetic diet

## 2022-06-01 NOTE — PROGRESS NOTES
[address] [place] [address]  Progress Note Sylvia Jones 1965, 59 y o  male MRN: [id number]  Unit/Bed#: E5 -01 Encounter: [id number]  Primary Care Provider: Krista Zepeda MD   Date and time admitted to hospital: 5/31/2022  9:47 AM    * Diabetic ulcer of left foot [facility])  Assessment & Plan  Lab Results   Component Value Date    HGBA1C 6 6 (H) 04/26/2022       Recent Labs     05/31/22  1613 05/31/22  2123 06/01/22  0751 06/01/22  1209   POCGLU 163* 168* 146* 154*       Blood Sugar Average: Last 72 hrs:  · (P) 157  75Patient was recently admitted for left foot ulcer, status post washout by Podiatry  He was treated with IV antibiotics and then discharged on Levaquin to be finished on 05/07/2022  · Patient noticed increased drainage and redness around the wound  · He was seen by Podiatry as outpatient, x-ray was done which showed cortical erosion of 5th metatarsal head adjacent to wound concern for osteomyelitis of 5th metatarsal head  · On admission procalcitonin negative, lactic acid negative  · Afebrile, no white blood cell count  · ESR CRP elevated  · MRI pending  · Planned for left partial 5th ray resection today  · Evaluated by ID, previous wound cultures growing Pseudomonas  Start IV Zosyn after surgery today  · DVT prophylaxis  · Pain management    Acute kidney injury superimposed on chronic kidney disease [facility])  Assessment & Plan  Lab Results   Component Value Date    EGFR 35 06/01/2022    EGFR 30 05/31/2022    EGFR 51 05/22/2022    CREATININE 1 99 (H) 06/01/2022    CREATININE 2 29 (H) 05/31/2022    CREATININE 1 48 (H) 05/22/2022   · Baseline creatinine from 1 4-1 6  · Creatinine on admission 2 29  · Patient reports poor p o   Intake, also infection contributing  · Creatinine today 1 99, improved with IV fluid  · Continue IV fluids  · Avoid hypotension and nephrotoxins  · BMP tomorrow    H/O eye surgery  Assessment & Plan  · Hx of eye surgery in March  · This morning his right eye is red, has blurred vision and he has difficulty opening his eye  Patient states he still has sutures in and it's been irritating his eye  His eye is red since surgery  · According to patient he is on gentamicin and Loteprednol Etabonate  He was restarted on gentamicin on admission but loteprednol is non formulary and patient stated his wife will bring it in yesterday, which did not happen  · I called patient's ophthalmologist Dr Eloise Morrow [phone]   · Sutures were supposed to come out last week but patient was too anxious and refused it  He is not supposed to be on gentamicin anymore (prolonged use can irritate the eye), so this was discontinued today  Also missing Loteprednol could cause irritation too  We will substitute Loteprednol with prednisolone acetate t i d  Until patient's wife is able to bring it in ( I talked to her over the phone and she will come later today)  Patient was informed and agreed  Diabetes mellitus [facility])  Assessment & Plan  Lab Results   Component Value Date    HGBA1C 6 6 (H) 04/26/2022       Recent Labs     05/31/22  1613 05/31/22  2123 06/01/22  0751 06/01/22  1209   POCGLU 163* 168* 146* 154*       Blood Sugar Average: Last 72 hrs:  · (P) 157 75 on Jardiance and Ozempic  · Started sliding scale while inpatient, patient refusing insulin  Would like to be started back on Jardiance  Explained to him insulin is the best way to manage blood glucose when he is NPO and in hospital  · Discussed the importance of keeping blood sugar under control for good wound healing, still refusing  · Diabetic diet    Essential hypertension  Assessment & Plan  · Continue Coreg 25 mg b i d   · Continue lisinopril/HCTZ 20/12 5 b i d         VTE Pharmacologic Prophylaxis: VTE Score: 3 Moderate Risk (Score 3-4) - Pharmacological DVT Prophylaxis Ordered: heparin  Patient Centered Rounds: I performed bedside rounds with nursing staff today    Discussions with Specialists or Other Care Team Provider:  Nursing, case management, Infectious Disease, Ophthalmology    Education and Discussions with Family / Patient: Updated  (significant other) via phone  Time Spent for Care: 30 minutes  More than 50% of total time spent on counseling and coordination of care as described above  Current Length of Stay: 1 day(s)  Current Patient Status: Inpatient   Certification Statement: The patient will continue to require additional inpatient hospital stay due to Foot also  Discharge Plan: Anticipate discharge in >72 hrs to discharge location to be determined pending rehab evaluations  Code Status: Level 3 - DNAR and DNI    Subjective:   Patient was seen and evaluated bedside, complaining of irritation of the right eye  "It is irritated because are not giving me enough fluids and also I have sutures too"    Objective:     Vitals:   Temp (24hrs), Av 7 °F (36 5 °C), Min:97 5 °F (36 4 °C), Max:98 °F (36 7 °C)    Temp:  [97 5 °F (36 4 °C)-98 °F (36 7 °C)] 97 6 °F (36 4 °C)  HR:  [73-83] 73  Resp:  [18] 18  BP: (123-147)/(76-89) 136/82  SpO2:  [94 %-98 %] 98 %  Body mass index is 35 8 kg/m²  Input and Output Summary (last 24 hours): Intake/Output Summary (Last 24 hours) at 2022 1547  Last data filed at 2022 1200  Gross per 24 hour   Intake 550 ml   Output --   Net 550 ml       Physical Exam:   Physical Exam  Constitutional:       General: He is not in acute distress  Appearance: He is obese  HENT:      Head: Atraumatic  Eyes:      General:         Right eye: No discharge  Comments: Right eye red   Cardiovascular:      Rate and Rhythm: Normal rate and regular rhythm  Heart sounds: No murmur heard  No friction rub  No gallop  Pulmonary:      Effort: Pulmonary effort is normal  No respiratory distress  Breath sounds: Normal breath sounds  No wheezing  Abdominal:      General: Bowel sounds are normal  There is no distension  Palpations: Abdomen is soft        Tenderness: There is no abdominal tenderness  Musculoskeletal:      Cervical back: Neck supple  Comments: Left foot ulcer   Skin:     General: Skin is warm and dry  Neurological:      General: No focal deficit present  Mental Status: He is alert  Psychiatric:         Mood and Affect: Mood normal           Additional Data:     Labs:  Results from last 7 days   Lab Units 06/01/22  0451   WBC Thousand/uL 4 96   HEMOGLOBIN g/dL 13 2   HEMATOCRIT % 39 9   PLATELETS Thousands/uL 209   NEUTROS PCT % 63   LYMPHS PCT % 22   MONOS PCT % 10   EOS PCT % 4     Results from last 7 days   Lab Units 06/01/22  0451 05/31/22  1045   SODIUM mmol/L 135* 134*   POTASSIUM mmol/L 4 4 5 2   CHLORIDE mmol/L 100 98*   CO2 mmol/L 24 24   BUN mg/dL 54* 49*   CREATININE mg/dL 1 99* 2 29*   ANION GAP mmol/L 11 12   CALCIUM mg/dL 9 2 9 3   ALBUMIN g/dL  --  3 4*   TOTAL BILIRUBIN mg/dL  --  0 29   ALK PHOS U/L  --  71   ALT U/L  --  23   AST U/L  --  16   GLUCOSE RANDOM mg/dL 144* 196*     Results from last 7 days   Lab Units 05/31/22  1045   INR  1 14     Results from last 7 days   Lab Units 06/01/22  1209 06/01/22  0751 05/31/22  2123 05/31/22  1613   POC GLUCOSE mg/dl 154* 146* 168* 163*         Results from last 7 days   Lab Units 05/31/22  1045   LACTIC ACID mmol/L 1 0   PROCALCITONIN ng/ml 0 14       Lines/Drains:  Invasive Devices  Report    Peripheral Intravenous Line  Duration           Peripheral IV 05/31/22 Left Antecubital 1 day    Peripheral IV 05/31/22 Right Forearm 1 day                      Imaging:   VAS lower limb venous duplex study, unilateral/limited   Final Result by Neri Cuevas MD (05/31 0237)      XR foot 3+ views LEFT   ED Interpretation by Aakash Joseph PA-C (05/31 1219)   Signs of osteomyelitis of 5th metacarpal seen by me at this time        Final Result by Reji Hernandez MD (05/31 1659)      Bony destruction in the head of the 5th metatarsal with a pathologic fracture in keeping with osteomyelitis which appears worse compared to the most recent study  There is questionable erosion of the proximal, lateral aspect of the proximal phalanx of    the 5th toe  This raises concern for the presence of septic arthritis  Workstation performed: [id number]         MRI foot/forefoot toes left wo contrast    (Results Pending)       Recent Cultures (last 7 days):   Results from last 7 days   Lab Units 05/31/22  1054 05/31/22  1045   BLOOD CULTURE  Received in Microbiology Lab  Culture in Progress  Received in Microbiology Lab  Culture in Progress  Last 24 Hours Medication List:   Current Facility-Administered Medications   Medication Dose Route Frequency Provider Last Rate    acetaminophen  650 mg Oral Q6H PRN Shira Ocampo MD      ALPRAZolam  1 mg Oral Once in imaging [facility], DPM      vitamin C  1,000 mg Oral BID Shira Ocampo MD      carvedilol  25 mg Oral BID With Meals Shira Ocampo MD      chlorhexidine  15 mL Swish & Spit PRN [facility], DPM      dextrose 5 % and sodium chloride 0 45 %  75 mL/hr Intravenous Continuous Vanessa MD Acacia 75 mL/hr (06/01/22 1424)    glycerin-hypromellose-  1 drop Both Eyes Q4H PRN Shira Ocampo MD      heparin (porcine)  5,000 Units Subcutaneous Q8H [facility] Shira Ocampo MD      insulin lispro  2-12 Units Subcutaneous TID AC Shira Ocampo MD      lisinopril-hydrochlorothiazide (PRINZIDE 20/12  5) combo dose   Oral BID Shira Ocampo MD      oxyCODONE  5 mg Oral Q6H PRN Shira Ocampo MD      piperacillin-tazobactam  3 375 g Intravenous Q6H JAN Mata      prednisoLONE acetate  1 drop Right Eye TID Shira Ocampo MD      traMADol  50 mg Oral Q6H PRN Shira Ocampo MD      vitamin E (tocopherol)  400 Units Oral Daily Shira Ocampo MD      zinc sulfate  220 mg Oral Daily Shira Ocampo MD          Today, Patient Was Seen By: Shira Ocampo MD    **Please Note: This note may have been constructed using a voice recognition system  **

## 2022-06-01 NOTE — ASSESSMENT & PLAN NOTE
Lab Results   Component Value Date    HGBA1C 6 6 (H) 04/26/2022       Recent Labs     05/31/22  1613 05/31/22  2123 06/01/22  0751 06/01/22  1209   POCGLU 163* 168* 146* 154*       Blood Sugar Average: Last 72 hrs:  · (P) 157  75Patient was recently admitted for left foot ulcer, status post washout by Podiatry  He was treated with IV antibiotics and then discharged on Levaquin to be finished on 05/07/2022  · Patient noticed increased drainage and redness around the wound  · He was seen by Podiatry as outpatient, x-ray was done which showed cortical erosion of 5th metatarsal head adjacent to wound concern for osteomyelitis of 5th metatarsal head  · On admission procalcitonin negative, lactic acid negative  · Afebrile, no white blood cell count  · ESR CRP elevated  · MRI pending  · Planned for left partial 5th ray resection today  · Evaluated by ID, previous wound cultures growing Pseudomonas    Start IV Zosyn after surgery today  · DVT prophylaxis  · Pain management

## 2022-06-01 NOTE — OCCUPATIONAL THERAPY NOTE
Occupational Therapy Note:    Patient Name: Tobi Lewis  UIYVP'J Date: 6/1/2022    OT consult received  Chart reviewed  Pt planned for OR today, 6/1/22, for L partial 5th ray resection with possible wound VAC application  Will cx and complete OT eval as medically appropriate post-op      RIVAS Jordan/FERNANDO

## 2022-06-01 NOTE — PLAN OF CARE
Problem: Potential for Falls  Goal: Patient will remain free of falls  Description: INTERVENTIONS:  - Educate patient/family on patient safety including physical limitations  - Instruct patient to call for assistance with activity   - Consult OT/PT to assist with strengthening/mobility   - Keep Call bell within reach  - Keep bed low and locked with side rails adjusted as appropriate  - Keep care items and personal belongings within reach  - Initiate and maintain comfort rounds  - Make Fall Risk Sign visible to staff  - Apply yellow socks and bracelet for high fall risk patients  - Consider moving patient to room near nurses station  Outcome: Progressing     Problem: PAIN - ADULT  Goal: Verbalizes/displays adequate comfort level or baseline comfort level  Description: Interventions:  - Encourage patient to monitor pain and request assistance  - Assess pain using appropriate pain scale  - Administer analgesics based on type and severity of pain and evaluate response  - Implement non-pharmacological measures as appropriate and evaluate response  - Consider cultural and social influences on pain and pain management  - Notify physician/advanced practitioner if interventions unsuccessful or patient reports new pain  Outcome: Progressing     Problem: INFECTION - ADULT  Goal: Absence or prevention of progression during hospitalization  Description: INTERVENTIONS:  - Assess and monitor for signs and symptoms of infection  - Monitor lab/diagnostic results  - Monitor all insertion sites, i e  indwelling lines, tubes, and drains  - Monitor endotracheal if appropriate and nasal secretions for changes in amount and color  - [place] appropriate cooling/warming therapies per order  - Administer medications as ordered  - Instruct and encourage patient and family to use good hand hygiene technique  - Identify and instruct in appropriate isolation precautions for identified infection/condition  Outcome: Progressing  Goal: Absence of fever/infection during neutropenic period  Description: INTERVENTIONS:  - Monitor WBC    Outcome: Progressing     Problem: SAFETY ADULT  Goal: Patient will remain free of falls  Description: INTERVENTIONS:  - Educate patient/family on patient safety including physical limitations  - Instruct patient to call for assistance with activity   - Consult OT/PT to assist with strengthening/mobility   - Keep Call bell within reach  - Keep bed low and locked with side rails adjusted as appropriate  - Keep care items and personal belongings within reach  - Initiate and maintain comfort rounds  - Make Fall Risk Sign visible to staff  - Apply yellow socks and bracelet for high fall risk patients  - Consider moving patient to room near nurses station  Outcome: Progressing  Goal: Maintain or return to baseline ADL function  Description: INTERVENTIONS:  -  Assess patient's ability to carry out ADLs; assess patient's baseline for ADL function and identify physical deficits which impact ability to perform ADLs (bathing, care of mouth/teeth, toileting, grooming, dressing, etc )  - Assess/evaluate cause of self-care deficits   - Assess range of motion  - Assess patient's mobility; develop plan if impaired  - Assess patient's need for assistive devices and provide as appropriate  - Encourage maximum independence but intervene and supervise when necessary  - Involve family in performance of ADLs  - Assess for home care needs following discharge   - Consider OT consult to assist with ADL evaluation and planning for discharge  - Provide patient education as appropriate  Outcome: Progressing  Goal: Maintains/Returns to pre admission functional level  Description: INTERVENTIONS:  - Perform BMAT or MOVE assessment daily    - Set and communicate daily mobility goal to care team and patient/family/caregiver     - Collaborate with rehabilitation services on mobility goals if consulted  - Out of bed for toileting  - Record patient progress and toleration of activity level   Outcome: Progressing     Problem: DISCHARGE PLANNING  Goal: Discharge to home or other facility with appropriate resources  Description: INTERVENTIONS:  - Identify barriers to discharge w/patient and caregiver  - Arrange for needed discharge resources and transportation as appropriate  - Identify discharge learning needs (meds, wound care, etc )  - Arrange for interpretive services to assist at discharge as needed  - Refer to Case Management Department for coordinating discharge planning if the patient needs post-hospital services based on physician/advanced practitioner order or complex needs related to functional status, cognitive ability, or social support system  Outcome: Progressing     Problem: Knowledge Deficit  Goal: Patient/family/caregiver demonstrates understanding of disease process, treatment plan, medications, and discharge instructions  Description: Complete learning assessment and assess knowledge base    Interventions:  - Provide teaching at level of understanding  - Provide teaching via preferred learning methods  Outcome: Progressing     Problem: METABOLIC, FLUID AND ELECTROLYTES - ADULT  Goal: Fluid balance maintained  Description: INTERVENTIONS:  - Monitor labs   - Monitor I/O and WT  - Instruct patient on fluid and nutrition as appropriate  - Assess for signs & symptoms of volume excess or deficit  Outcome: Progressing  Goal: Glucose maintained within target range  Description: INTERVENTIONS:  - Monitor Blood Glucose as ordered  - Assess for signs and symptoms of hyperglycemia and hypoglycemia  - Administer ordered medications to maintain glucose within target range  - Assess nutritional intake and initiate nutrition service referral as needed  Outcome: Progressing     Problem: HEMATOLOGIC - ADULT  Goal: Maintains hematologic stability  Description: INTERVENTIONS  - Assess for signs and symptoms of bleeding or hemorrhage  - Monitor labs  - Administer supportive blood products/factors as ordered and appropriate  Outcome: Progressing     Problem: MOBILITY - ADULT  Goal: Maintain or return to baseline ADL function  Description: INTERVENTIONS:  -  Assess patient's ability to carry out ADLs; assess patient's baseline for ADL function and identify physical deficits which impact ability to perform ADLs (bathing, care of mouth/teeth, toileting, grooming, dressing, etc )  - Assess/evaluate cause of self-care deficits   - Assess range of motion  - Assess patient's mobility; develop plan if impaired  - Assess patient's need for assistive devices and provide as appropriate  - Encourage maximum independence but intervene and supervise when necessary  - Involve family in performance of ADLs  - Assess for home care needs following discharge   - Consider OT consult to assist with ADL evaluation and planning for discharge  - Provide patient education as appropriate  Outcome: Progressing  Goal: Maintains/Returns to pre admission functional level  Description: INTERVENTIONS:  - Perform BMAT or MOVE assessment daily    - Set and communicate daily mobility goal to care team and patient/family/caregiver     - Collaborate with rehabilitation services on mobility goals if consulted  - Out of bed for toileting  - Record patient progress and toleration of activity level   Outcome: Progressing

## 2022-06-01 NOTE — PROGRESS NOTES
Progress Note - Infectious Disease   Inga Judge 59 y o  male MRN: [id number]  Unit/Bed#: E5 -01 Encounter: [id number]    Impression/Plan:  1  Infected left diabetic foot ulcer with underlying osteomyelitis of the 5th metatarsal head  Recurrent infection despite debridement and antibiotic therapy  Status post OR debridement 4/28/22; no purulence or sinus tracts seen, underlying tissue and bone appeared healthy and a wound vac was placed  MRI did not show osteomyelitis  The OR culture grew a few colonies Pseudomonas aeruginosa  The patient was clinically improving after source control, despite lack of antibiotic coverage  Antibiotics were switched to IV Zosyn, and then he was transitioned to PO levofloxacin at discharge to complete a 7 day total course  The patient now has persistent ulceration with progression of infection and underlying 5th metatarsal head osteomyelitis  He is clinically stable without fever or leukocytosis  He will proceed with MRI of the L foot and then to the OR this afternoon for further surgical resection  Recommend holding all antibiotics prior to surgery given his clinical stability to ensure accurate cultures are obtained  Confirmed with bedside RN that no antibiotics are to be given this morning    -anticipate starting IV zosyn after surgery today  -I discussed in detail with patient that antibiotic treatment will be determined by surgical outcome and culture results and that giving "double doses and double duration" is clinically dangerous and could cause significant toxicities   -monitor CBC and BMP  -follow up MRI  -follow up OR cultures  -monitor vitals  -serial L foot exams  -local wound care per podiatry  -continue close follow up with podiatry    2  Type 2 diabetes mellitus without long term insulin use  Patient's last HbA1c was 6 6% on 4/26/2022  Elevated blood glucose is risk factor for infection   Recommend tight glycemic control   -blood glucose management per primary service     3  Chronic kidney disease stage 3   Creatinine elevated on admission  Suspect this is prerenal  Creatinine has improved to 1 99 today   -monitor creatinine  -dose adjust antibiotics for renal function as needed  -avoid nephrotoxins     4  Morbid obesity  BMI = 35 8  Risk factor for complicating infection and relapse   -stress weight loss through diet and exercise     5  Antibiotic allergies  Severe rash with cephalosporins, however he tolerates penicillins without difficulty  Tolerated zosyn and unasyn    -monitor patient for adverse medication reactions    Above plan was discussed in detail with patient at the bedside  RN present for my interaction with patient  Above plan was discussed in detail with SLIM  Reported patient's verbal threats to E5 charge nurse  Antibiotics:  No current antibiotic use    Subjective:  Patient told me that he's done with the infectious disease providers in this hospital  The patient states that this time he will receive the antibiotics he wants on his terms only  He states that he wants all double doses and double the treatment duration  Patient states that if previous infectious disease providers had listened to him he wouldn't be in this mess and that he will have his  follow up with all of us  He reports that any infectious disease providers who doesn't meet his demands "I will throw out of my window " I discussed with patient that verbal threats will not be tolerated but that I am going to continue to on his case to treat any found infection to the best of our ability  I discussed with patient that given his clinical stability antibiotics are being held until after surgery to ensure the most accurate sample will be collected  I discussed that surgical findings/outcomes and culture results will guide our antibiotic choice and treatment duration   I discussed with patient the double dosing and prolonged duration of antibiotic treatment puts him at high risk for toxicities, especially to his kidneys  Patient told me that I need to give him what he demands or else his  will force me to  He then told me "we're done, leave " Unable to ask full ROS questions  Objective:  Vitals:  Temp:  [97 5 °F (36 4 °C)-98 9 °F (37 2 °C)] 97 5 °F (36 4 °C)  HR:  [79-99] 80  Resp:  [18-20] 18  BP: (108-147)/(65-89) 123/76  SpO2:  [93 %-96 %] 94 %  Temp (24hrs), Av 1 °F (36 7 °C), Min:97 5 °F (36 4 °C), Max:98 9 °F (37 2 °C)  Current: Temperature: 97 5 °F (36 4 °C)    Physical Exam:   General Appearance:  Alert, interactive, angry, agitated, hostile  Appeared comfortable sitting up in bed  Lungs:   Respirations unlabored on room air  Extremities: L foot with clean dry gauze dressing intact  Skin: No new rashes noted on exposed skin  Labs, Imaging, & Other studies:   All pertinent labs and imaging studies were personally reviewed  Results from last 7 days   Lab Units 22  0451 22  1045   WBC Thousand/uL 4 96 7 32   HEMOGLOBIN g/dL 13 2 12 7   PLATELETS Thousands/uL 209 246     Results from last 7 days   Lab Units 22  0451 22  1045   POTASSIUM mmol/L 4 4 5 2   CHLORIDE mmol/L 100 98*   CO2 mmol/L 24 24   BUN mg/dL 54* 49*   CREATININE mg/dL 1 99* 2 29*   EGFR ml/min/1 73sq m 35 30   CALCIUM mg/dL 9 2 9 3   AST U/L  --  16   ALT U/L  --  23   ALK PHOS U/L  --  71     Results from last 7 days   Lab Units 22  1054 22  1045   BLOOD CULTURE  Received in Microbiology Lab  Culture in Progress  Received in Microbiology Lab  Culture in Progress       Results from last 7 days   Lab Units 22  1045   PROCALCITONIN ng/ml 0 14     Results from last 7 days   Lab Units 22  1045   CRP mg/L 14 6*

## 2022-06-01 NOTE — PLAN OF CARE
Problem: Potential for Falls  Goal: Patient will remain free of falls  Description: INTERVENTIONS:  - Educate patient/family on patient safety including physical limitations  - Instruct patient to call for assistance with activity   - Consult OT/PT to assist with strengthening/mobility   - Keep Call bell within reach  - Keep bed low and locked with side rails adjusted as appropriate  - Keep care items and personal belongings within reach  - Initiate and maintain comfort rounds  - Make Fall Risk Sign visible to staff  - Apply yellow socks and bracelet for high fall risk patients  - Consider moving patient to room near nurses station  Outcome: Progressing     Problem: PAIN - ADULT  Goal: Verbalizes/displays adequate comfort level or baseline comfort level  Description: Interventions:  - Encourage patient to monitor pain and request assistance  - Assess pain using appropriate pain scale  - Administer analgesics based on type and severity of pain and evaluate response  - Implement non-pharmacological measures as appropriate and evaluate response  - Consider cultural and social influences on pain and pain management  - Notify physician/advanced practitioner if interventions unsuccessful or patient reports new pain  Outcome: Progressing     Problem: INFECTION - ADULT  Goal: Absence or prevention of progression during hospitalization  Description: INTERVENTIONS:  - Assess and monitor for signs and symptoms of infection  - Monitor lab/diagnostic results  - Monitor all insertion sites, i e  indwelling lines, tubes, and drains  - Monitor endotracheal if appropriate and nasal secretions for changes in amount and color  - [place] appropriate cooling/warming therapies per order  - Administer medications as ordered  - Instruct and encourage patient and family to use good hand hygiene technique  - Identify and instruct in appropriate isolation precautions for identified infection/condition  Outcome: Progressing  Goal: Absence of fever/infection during neutropenic period  Description: INTERVENTIONS:  - Monitor WBC    Outcome: Progressing     Problem: SAFETY ADULT  Goal: Patient will remain free of falls  Description: INTERVENTIONS:  - Educate patient/family on patient safety including physical limitations  - Instruct patient to call for assistance with activity   - Consult OT/PT to assist with strengthening/mobility   - Keep Call bell within reach  - Keep bed low and locked with side rails adjusted as appropriate  - Keep care items and personal belongings within reach  - Initiate and maintain comfort rounds  - Make Fall Risk Sign visible to staff  - Apply yellow socks and bracelet for high fall risk patients  - Consider moving patient to room near nurses station  Outcome: Progressing  Goal: Maintain or return to baseline ADL function  Description: INTERVENTIONS:  -  Assess patient's ability to carry out ADLs; assess patient's baseline for ADL function and identify physical deficits which impact ability to perform ADLs (bathing, care of mouth/teeth, toileting, grooming, dressing, etc )  - Assess/evaluate cause of self-care deficits   - Assess range of motion  - Assess patient's mobility; develop plan if impaired  - Assess patient's need for assistive devices and provide as appropriate  - Encourage maximum independence but intervene and supervise when necessary  - Involve family in performance of ADLs  - Assess for home care needs following discharge   - Consider OT consult to assist with ADL evaluation and planning for discharge  - Provide patient education as appropriate  Outcome: Progressing  Goal: Maintains/Returns to pre admission functional level  Description: INTERVENTIONS:  - Perform BMAT or MOVE assessment daily    - Set and communicate daily mobility goal to care team and patient/family/caregiver     - Collaborate with rehabilitation services on mobility goals if consulted  - Out of bed for toileting  - Record patient progress and toleration of activity level   Outcome: Progressing     Problem: DISCHARGE PLANNING  Goal: Discharge to home or other facility with appropriate resources  Description: INTERVENTIONS:  - Identify barriers to discharge w/patient and caregiver  - Arrange for needed discharge resources and transportation as appropriate  - Identify discharge learning needs (meds, wound care, etc )  - Arrange for interpretive services to assist at discharge as needed  - Refer to Case Management Department for coordinating discharge planning if the patient needs post-hospital services based on physician/advanced practitioner order or complex needs related to functional status, cognitive ability, or social support system  Outcome: Progressing     Problem: Knowledge Deficit  Goal: Patient/family/caregiver demonstrates understanding of disease process, treatment plan, medications, and discharge instructions  Description: Complete learning assessment and assess knowledge base    Interventions:  - Provide teaching at level of understanding  - Provide teaching via preferred learning methods  Outcome: Progressing     Problem: METABOLIC, FLUID AND ELECTROLYTES - ADULT  Goal: Fluid balance maintained  Description: INTERVENTIONS:  - Monitor labs   - Monitor I/O and WT  - Instruct patient on fluid and nutrition as appropriate  - Assess for signs & symptoms of volume excess or deficit  Outcome: Progressing  Goal: Glucose maintained within target range  Description: INTERVENTIONS:  - Monitor Blood Glucose as ordered  - Assess for signs and symptoms of hyperglycemia and hypoglycemia  - Administer ordered medications to maintain glucose within target range  - Assess nutritional intake and initiate nutrition service referral as needed  Outcome: Progressing     Problem: HEMATOLOGIC - ADULT  Goal: Maintains hematologic stability  Description: INTERVENTIONS  - Assess for signs and symptoms of bleeding or hemorrhage  - Monitor labs  - Administer supportive blood products/factors as ordered and appropriate  Outcome: Progressing     Problem: MOBILITY - ADULT  Goal: Maintain or return to baseline ADL function  Description: INTERVENTIONS:  -  Assess patient's ability to carry out ADLs; assess patient's baseline for ADL function and identify physical deficits which impact ability to perform ADLs (bathing, care of mouth/teeth, toileting, grooming, dressing, etc )  - Assess/evaluate cause of self-care deficits   - Assess range of motion  - Assess patient's mobility; develop plan if impaired  - Assess patient's need for assistive devices and provide as appropriate  - Encourage maximum independence but intervene and supervise when necessary  - Involve family in performance of ADLs  - Assess for home care needs following discharge   - Consider OT consult to assist with ADL evaluation and planning for discharge  - Provide patient education as appropriate  Outcome: Progressing  Goal: Maintains/Returns to pre admission functional level  Description: INTERVENTIONS:  - Perform BMAT or MOVE assessment daily    - Set and communicate daily mobility goal to care team and patient/family/caregiver     - Collaborate with rehabilitation services on mobility goals if consulted  - Out of bed for toileting  - Record patient progress and toleration of activity level   Outcome: Progressing

## 2022-06-01 NOTE — ASSESSMENT & PLAN NOTE
· Hx of eye surgery in March  · This morning his right eye is red, has blurred vision and he has difficulty opening his eye  Patient states he still has sutures in and it's been irritating his eye  His eye is red since surgery  · According to patient he is on gentamicin and Loteprednol Etabonate  He was restarted on gentamicin on admission but loteprednol is non formulary and patient stated his wife will bring it in yesterday, which did not happen  · I called patient's ophthalmologist Dr Dulce Coleman [phone]   · Sutures were supposed to come out last week but patient was too anxious and refused it  He is not supposed to be on gentamicin anymore (prolonged use can irritate the eye), so this was discontinued today  Also missing Loteprednol could cause irritation too  We will substitute Loteprednol with prednisolone acetate t i d  Until patient's wife is able to bring it in ( I talked to her over the phone and she will come later today)  Patient was informed and agreed

## 2022-06-02 LAB
ANION GAP SERPL CALCULATED.3IONS-SCNC: 9 MMOL/L (ref 4–13)
BASOPHILS # BLD AUTO: 0.04 THOUSANDS/ΜL (ref 0–0.1)
BASOPHILS NFR BLD AUTO: 1 % (ref 0–1)
BUN SERPL-MCNC: 44 MG/DL (ref 5–25)
CALCIUM SERPL-MCNC: 9.2 MG/DL (ref 8.3–10.1)
CHLORIDE SERPL-SCNC: 101 MMOL/L (ref 100–108)
CO2 SERPL-SCNC: 25 MMOL/L (ref 21–32)
CREAT SERPL-MCNC: 1.46 MG/DL (ref 0.6–1.3)
EOSINOPHIL # BLD AUTO: 0.19 THOUSAND/ΜL (ref 0–0.61)
EOSINOPHIL NFR BLD AUTO: 3 % (ref 0–6)
ERYTHROCYTE [DISTWIDTH] IN BLOOD BY AUTOMATED COUNT: 12.3 % (ref 11.6–15.1)
GFR SERPL CREATININE-BSD FRML MDRD: 51 ML/MIN/1.73SQ M
GLUCOSE SERPL-MCNC: 126 MG/DL (ref 65–140)
GLUCOSE SERPL-MCNC: 155 MG/DL (ref 65–140)
GLUCOSE SERPL-MCNC: 177 MG/DL (ref 65–140)
GLUCOSE SERPL-MCNC: 177 MG/DL (ref 65–140)
HCT VFR BLD AUTO: 38.5 % (ref 36.5–49.3)
HGB BLD-MCNC: 12.6 G/DL (ref 12–17)
IMM GRANULOCYTES # BLD AUTO: 0.01 THOUSAND/UL (ref 0–0.2)
IMM GRANULOCYTES NFR BLD AUTO: 0 % (ref 0–2)
LYMPHOCYTES # BLD AUTO: 0.74 THOUSANDS/ΜL (ref 0.6–4.47)
LYMPHOCYTES NFR BLD AUTO: 13 % (ref 14–44)
MCH RBC QN AUTO: 30.8 PG (ref 26.8–34.3)
MCHC RBC AUTO-ENTMCNC: 32.7 G/DL (ref 31.4–37.4)
MCV RBC AUTO: 94 FL (ref 82–98)
MONOCYTES # BLD AUTO: 0.73 THOUSAND/ΜL (ref 0.17–1.22)
MONOCYTES NFR BLD AUTO: 13 % (ref 4–12)
NEUTROPHILS # BLD AUTO: 4.03 THOUSANDS/ΜL (ref 1.85–7.62)
NEUTS SEG NFR BLD AUTO: 70 % (ref 43–75)
NRBC BLD AUTO-RTO: 0 /100 WBCS
PLATELET # BLD AUTO: 205 THOUSANDS/UL (ref 149–390)
PMV BLD AUTO: 10 FL (ref 8.9–12.7)
POTASSIUM SERPL-SCNC: 5 MMOL/L (ref 3.5–5.3)
RBC # BLD AUTO: 4.09 MILLION/UL (ref 3.88–5.62)
SODIUM SERPL-SCNC: 135 MMOL/L (ref 136–145)
WBC # BLD AUTO: 5.74 THOUSAND/UL (ref 4.31–10.16)

## 2022-06-02 PROCEDURE — 82948 REAGENT STRIP/BLOOD GLUCOSE: CPT

## 2022-06-02 PROCEDURE — 85025 COMPLETE CBC W/AUTO DIFF WBC: CPT

## 2022-06-02 PROCEDURE — 80048 BASIC METABOLIC PNL TOTAL CA: CPT

## 2022-06-02 PROCEDURE — 97163 PT EVAL HIGH COMPLEX 45 MIN: CPT | Performed by: PHYSICAL THERAPIST

## 2022-06-02 PROCEDURE — 99232 SBSQ HOSP IP/OBS MODERATE 35: CPT | Performed by: INTERNAL MEDICINE

## 2022-06-02 PROCEDURE — 99231 SBSQ HOSP IP/OBS SF/LOW 25: CPT | Performed by: INTERNAL MEDICINE

## 2022-06-02 RX ORDER — OXYCODONE HYDROCHLORIDE 10 MG/1
10 TABLET ORAL EVERY 4 HOURS PRN
Status: DISCONTINUED | OUTPATIENT
Start: 2022-06-02 | End: 2022-06-09 | Stop reason: HOSPADM

## 2022-06-02 RX ORDER — OXYCODONE HYDROCHLORIDE 5 MG/1
5 TABLET ORAL EVERY 4 HOURS PRN
Status: DISCONTINUED | OUTPATIENT
Start: 2022-06-02 | End: 2022-06-05

## 2022-06-02 RX ADMIN — HYDROCHLOROTHIAZIDE: 12.5 TABLET ORAL at 21:25

## 2022-06-02 RX ADMIN — ZINC SULFATE 220 MG (50 MG) CAPSULE 220 MG: CAPSULE at 08:47

## 2022-06-02 RX ADMIN — HYDROMORPHONE HYDROCHLORIDE 0.5 MG: 1 INJECTION, SOLUTION INTRAMUSCULAR; INTRAVENOUS; SUBCUTANEOUS at 02:43

## 2022-06-02 RX ADMIN — HYDROCHLOROTHIAZIDE: 12.5 TABLET ORAL at 08:43

## 2022-06-02 RX ADMIN — LOTEPREDNOL ETABONATE 1 DROP: 10 SUSPENSION TOPICAL at 13:04

## 2022-06-02 RX ADMIN — ACETAMINOPHEN 650 MG: 325 TABLET, FILM COATED ORAL at 08:43

## 2022-06-02 RX ADMIN — OXYCODONE HYDROCHLORIDE 5 MG: 5 TABLET ORAL at 08:44

## 2022-06-02 RX ADMIN — PIPERACILLIN AND TAZOBACTAM 3.38 G: 3; .375 INJECTION, POWDER, LYOPHILIZED, FOR SOLUTION INTRAVENOUS at 02:43

## 2022-06-02 RX ADMIN — HYDROMORPHONE HYDROCHLORIDE 0.5 MG: 1 INJECTION, SOLUTION INTRAMUSCULAR; INTRAVENOUS; SUBCUTANEOUS at 10:49

## 2022-06-02 RX ADMIN — CARVEDILOL 25 MG: 25 TABLET, FILM COATED ORAL at 16:35

## 2022-06-02 RX ADMIN — HYDROMORPHONE HYDROCHLORIDE 0.5 MG: 1 INJECTION, SOLUTION INTRAMUSCULAR; INTRAVENOUS; SUBCUTANEOUS at 19:40

## 2022-06-02 RX ADMIN — LOTEPREDNOL ETABONATE 1 DROP: 10 SUSPENSION TOPICAL at 21:25

## 2022-06-02 RX ADMIN — OXYCODONE HYDROCHLORIDE 10 MG: 10 TABLET ORAL at 21:26

## 2022-06-02 RX ADMIN — PIPERACILLIN AND TAZOBACTAM 3.38 G: 3; .375 INJECTION, POWDER, LYOPHILIZED, FOR SOLUTION INTRAVENOUS at 08:39

## 2022-06-02 RX ADMIN — OXYCODONE HYDROCHLORIDE AND ACETAMINOPHEN 1000 MG: 500 TABLET ORAL at 21:24

## 2022-06-02 RX ADMIN — SODIUM CHLORIDE 75 ML/HR: 0.9 INJECTION, SOLUTION INTRAVENOUS at 14:24

## 2022-06-02 RX ADMIN — Medication 400 UNITS: at 08:47

## 2022-06-02 RX ADMIN — OXYCODONE HYDROCHLORIDE AND ACETAMINOPHEN 1000 MG: 500 TABLET ORAL at 08:43

## 2022-06-02 RX ADMIN — PIPERACILLIN AND TAZOBACTAM 3.38 G: 3; .375 INJECTION, POWDER, LYOPHILIZED, FOR SOLUTION INTRAVENOUS at 14:23

## 2022-06-02 RX ADMIN — PIPERACILLIN AND TAZOBACTAM 3.38 G: 3; .375 INJECTION, POWDER, LYOPHILIZED, FOR SOLUTION INTRAVENOUS at 20:39

## 2022-06-02 RX ADMIN — GLYCERIN, HYPROMELLOSE, POLYETHYLENE GLYCOL 1 DROP: .2; .2; 1 LIQUID OPHTHALMIC at 10:49

## 2022-06-02 NOTE — PHYSICAL THERAPY NOTE
Physical Therapy Evaluation    Performed at least 2 patient identifiers during session:  Patient Active Problem List   Diagnosis    Essential hypertension    Diabetes mellitus ([facility] )    Class 2 severe obesity with serious comorbidity and body mass index (BMI) of 37 0 to 37 9 in adult [facility])    Stage 2 chronic kidney disease    Low back pain with sciatica    Neck pain    Lumbar radiculopathy    Cervical radiculopathy    Myofascial pain    Neuropathy    Erectile dysfunction    Cellulitis    Chronic kidney disease, stage 3 (HCC)    Dog bite of arm, right, sequela    Diabetic foot ulcer ([facility] )    H/O eye surgery    Acute kidney injury superimposed on chronic kidney disease ([facility] )    Diabetic ulcer of left foot ([place] [address] )    Pre-operative clearance       Past Medical History:   Diagnosis Date    H/O eye surgery     High blood sugar     Hypertension        Past Surgical History:   Procedure Laterality Date    HERNIA REPAIR      KIDNEY SURGERY      MOUTH SURGERY      [place] AMPUTATION METATARSAL+TOE,SINGLE Left 6/1/2022    Procedure: RAY RESECTION FOOT;  Surgeon: Carla Vallejo DPM;  [facility];  Service: Podiatry    WOUND DEBRIDEMENT Left 4/28/2022    Procedure: Left foot washout;  Surgeon: Carla Vallejo DPM;  [facility];  Service: Podiatry         An AM-PAC Basic Mobility standardized score less than 42 9 suggests the patient may benefit from discharge to post-acute rehab services      History: co - morbidities, fall risk, use of assistive device, assist for adl's, cognition, multiple lines  Exam: impairments in locomotion, musculoskeletal, balance,posture, joint integrity, skin integrity, cardiac, pulmonary, cognition   Clinical: unstable/unpredictable  Complexity:high  Bunny Means, PT   Physical Therapy Evaluation    Performed at least 2 patient identifiers during session:  Patient Active Problem List   Diagnosis    Essential hypertension    Diabetes mellitus ([facility] )    Class 2 severe obesity with serious comorbidity and body mass index (BMI) of 37 0 to 37 9 in adult [facility])    Stage 2 chronic kidney disease    Low back pain with sciatica    Neck pain    Lumbar radiculopathy    Cervical radiculopathy    Myofascial pain    Neuropathy    Erectile dysfunction    Cellulitis    Chronic kidney disease, stage 3 (HCC)    Dog bite of arm, right, sequela    Diabetic foot ulcer ([facility] )    H/O eye surgery    Acute kidney injury superimposed on chronic kidney disease ([facility] )    Diabetic ulcer of left foot (HCC)    Pre-operative clearance       Past Medical History:   Diagnosis Date    H/O eye surgery     High blood sugar     Hypertension        Past Surgical History:   Procedure Laterality Date    HERNIA REPAIR      KIDNEY SURGERY      MOUTH SURGERY      [place] AMPUTATION METATARSAL+TOE,SINGLE Left 6/1/2022    Procedure: RAY RESECTION FOOT;  Surgeon: Salima Hughes DPM;  [facility];  Service: Podiatry    WOUND DEBRIDEMENT Left 4/28/2022    Procedure: Left foot washout;  Surgeon: Salima Hughes DPM;  [facility];  Service: Podiatry        06/02/22 1517   PT Last Visit   PT Visit Date 06/02/22   Note Type   Note type Evaluation   Pain Assessment   Pain Assessment Tool 0-10   Pain Score 5   Pain Location/Orientation Orientation: Left; Location: Foot   Hospital Pain Intervention(s) Repositioned; Ambulation/increased activity; Elevated; Emotional support   Restrictions/Precautions   Weight Bearing Precautions Per Order Yes   LLE Weight Bearing Per Order NWB   Home Living   Type of [address] Multi-level;Stairs to enter without rails; Able to live on main level with bedroom/bathroom  (7 stairs to enter)   [address]  (rollabout)   Prior Function   Level of [place] Independent with ADLs and functional mobility   Lives With Spouse   Receives Help From Family   ADL Assistance Independent   IADLs Independent   Falls in the last 6 months 1 to 4  (1)   Comments pt drives  indep using cruthces or rollabout  pt has been doing stairs using crutches  General   Family/Caregiver Present No   Cognition   Overall Cognitive Status WFL   Orientation Level Oriented X4   Subjective   Subjective pt reoprts he has been using crutches for a long time  has worked out his mobility habits  RUE Assessment   RUE Assessment WNL   LUE Assessment   LUE Assessment WNL   RLE Assessment   RLE Assessment WNL   LLE Assessment   LLE Assessment   (hip and knee wnl  foot/ankle not tested)   Vision-Basic Assessment   Current Vision No visual deficits   Coordination   Movements are Fluid and Coordinated 1   Sensation X   Light Touch   LLE Light Touch Impaired   LLE Light Touch Comments foot   Bed Mobility   Rolling R 7  Independent   Rolling L 7  Independent   Supine to Sit 7  Independent   Sit to Supine 7  Independent   Transfers   Sit to Stand 6  Modified independent   Stand to Sit 6  Modified independent   Ambulation/Elevation   Gait pattern WNL   Gait Assistance 6  Modified independent   Assistive Device Rolling walker   Distance 20'   Balance   Static Sitting Normal   Dynamic Sitting Normal   Static Standing Fair   Dynamic Standing Fair   Ambulatory Fair  (with rw)   Endurance Deficit   Endurance Deficit No   Activity Tolerance   Activity Tolerance Patient tolerated treatment well   Nurse Made Aware yes   Assessment   Assessment pt admitted with chronic l foot ulcer, determibned to have OM 5th metatarsal  underwent 5th ray resection and now referred to PT  pt is nwb lle  pt was indep PTA, using crutches on stanrs and level surfaces nwb lle  pt also has rollabout to use  pt demonstrated indep and steady amb using rw  pt will transition to crutches on his own  pt tolerated amb forward and backward using rw  pt did not have any loss of balance  pt has current deficits in skin and joint integrity, gait stability without device, pain control, sensation l foot  pt has intact rom and strength, l ankle/foot not tested   pt does not have any skilled PT needs at this time and will be able to return home  no home PT needed at this time   Barriers to Discharge Inaccessible home environment   Goals   Patient Goals go home   Recommendation   PT Discharge Recommendation No rehabilitation needs   Equipment Recommended Crutches  (rollabout  has)   AM-PAC Basic Mobility Inpatient   Turning in Bed Without Bedrails 4   Lying on Back to Sitting on Edge of Flat Bed 4   Moving Bed to Chair 4   Standing Up From Chair 4   Walk in Room 4   Climb 3-5 Stairs 3   Basic Mobility Inpatient Raw Score 23   Basic Mobility Standardized Score 50 88   Highest Level Of Mobility   -HLM Goal 7: Walk 25 feet or more   JH-HLM Achieved 7: Walk 25 feet or more       An AM-PAC Basic Mobility standardized score less than 42 9 suggests the patient may benefit from discharge to post-acute rehab services      History: co - morbidities, fall risk, use of assistive device,  multiple lines, inaccessible home  Exam: impairments in locomotion, musculoskeletal, balance, joint integrity, skin integrity  Clinical: unstable/unpredictable- wound infection  Complexity:high  Lizet Soliman, PT

## 2022-06-02 NOTE — ASSESSMENT & PLAN NOTE
· Follows with Dr Dulce Coleman ([phone])  · Was supposed to have sutures removed but patient has not followed up yet    · Continue eyedrops but advised need for close follow-up

## 2022-06-02 NOTE — ANESTHESIA POSTPROCEDURE EVALUATION
Post-Op Assessment Note    CV Status:  Stable    Pain management: adequate     Mental Status:  Alert and awake   Hydration Status:  Euvolemic   PONV Controlled:  Controlled   Airway Patency:  Patent      Post Op Vitals Reviewed: Yes      Staff: Anesthesiologist, CRNA         No complications documented      BP      Temp     Pulse     Resp      SpO2      /83   Pulse 80   Temp (!) 97 °F (36 1 °C)   Resp 13   Ht 6' (1 829 m)   SpO2 95%   BMI 35 80 kg/m²

## 2022-06-02 NOTE — PLAN OF CARE
Problem: Potential for Falls  Goal: Patient will remain free of falls  Description: INTERVENTIONS:  - Educate patient/family on patient safety including physical limitations  - Instruct patient to call for assistance with activity   - Consult OT/PT to assist with strengthening/mobility   - Keep Call bell within reach  - Keep bed low and locked with side rails adjusted as appropriate  - Keep care items and personal belongings within reach  - Initiate and maintain comfort rounds  - Make Fall Risk Sign visible to staff  - Apply yellow socks and bracelet for high fall risk patients  - Consider moving patient to room near nurses station  Outcome: Progressing     Problem: PAIN - ADULT  Goal: Verbalizes/displays adequate comfort level or baseline comfort level  Description: Interventions:  - Encourage patient to monitor pain and request assistance  - Assess pain using appropriate pain scale  - Administer analgesics based on type and severity of pain and evaluate response  - Implement non-pharmacological measures as appropriate and evaluate response  - Consider cultural and social influences on pain and pain management  - Notify physician/advanced practitioner if interventions unsuccessful or patient reports new pain  Outcome: Progressing     Problem: INFECTION - ADULT  Goal: Absence or prevention of progression during hospitalization  Description: INTERVENTIONS:  - Assess and monitor for signs and symptoms of infection  - Monitor lab/diagnostic results  - Monitor all insertion sites, i e  indwelling lines, tubes, and drains  - Monitor endotracheal if appropriate and nasal secretions for changes in amount and color  - [place] appropriate cooling/warming therapies per order  - Administer medications as ordered  - Instruct and encourage patient and family to use good hand hygiene technique  - Identify and instruct in appropriate isolation precautions for identified infection/condition  Outcome: Progressing  Goal: Absence of fever/infection during neutropenic period  Description: INTERVENTIONS:  - Monitor WBC    Outcome: Progressing     Problem: SAFETY ADULT  Goal: Patient will remain free of falls  Description: INTERVENTIONS:  - Educate patient/family on patient safety including physical limitations  - Instruct patient to call for assistance with activity   - Consult OT/PT to assist with strengthening/mobility   - Keep Call bell within reach  - Keep bed low and locked with side rails adjusted as appropriate  - Keep care items and personal belongings within reach  - Initiate and maintain comfort rounds  - Make Fall Risk Sign visible to staff  - Apply yellow socks and bracelet for high fall risk patients  - Consider moving patient to room near nurses station  Outcome: Progressing  Goal: Maintain or return to baseline ADL function  Description: INTERVENTIONS:  -  Assess patient's ability to carry out ADLs; assess patient's baseline for ADL function and identify physical deficits which impact ability to perform ADLs (bathing, care of mouth/teeth, toileting, grooming, dressing, etc )  - Assess/evaluate cause of self-care deficits   - Assess range of motion  - Assess patient's mobility; develop plan if impaired  - Assess patient's need for assistive devices and provide as appropriate  - Encourage maximum independence but intervene and supervise when necessary  - Involve family in performance of ADLs  - Assess for home care needs following discharge   - Consider OT consult to assist with ADL evaluation and planning for discharge  - Provide patient education as appropriate  Outcome: Progressing  Goal: Maintains/Returns to pre admission functional level  Description: INTERVENTIONS:  - Perform BMAT or MOVE assessment daily    - Set and communicate daily mobility goal to care team and patient/family/caregiver     - Collaborate with rehabilitation services on mobility goals if consulted  - Out of bed for toileting  - Record patient progress and toleration of activity level   Outcome: Progressing     Problem: DISCHARGE PLANNING  Goal: Discharge to home or other facility with appropriate resources  Description: INTERVENTIONS:  - Identify barriers to discharge w/patient and caregiver  - Arrange for needed discharge resources and transportation as appropriate  - Identify discharge learning needs (meds, wound care, etc )  - Arrange for interpretive services to assist at discharge as needed  - Refer to Case Management Department for coordinating discharge planning if the patient needs post-hospital services based on physician/advanced practitioner order or complex needs related to functional status, cognitive ability, or social support system  Outcome: Progressing     Problem: Knowledge Deficit  Goal: Patient/family/caregiver demonstrates understanding of disease process, treatment plan, medications, and discharge instructions  Description: Complete learning assessment and assess knowledge base    Interventions:  - Provide teaching at level of understanding  - Provide teaching via preferred learning methods  Outcome: Progressing     Problem: METABOLIC, FLUID AND ELECTROLYTES - ADULT  Goal: Fluid balance maintained  Description: INTERVENTIONS:  - Monitor labs   - Monitor I/O and WT  - Instruct patient on fluid and nutrition as appropriate  - Assess for signs & symptoms of volume excess or deficit  Outcome: Progressing  Goal: Glucose maintained within target range  Description: INTERVENTIONS:  - Monitor Blood Glucose as ordered  - Assess for signs and symptoms of hyperglycemia and hypoglycemia  - Administer ordered medications to maintain glucose within target range  - Assess nutritional intake and initiate nutrition service referral as needed  Outcome: Progressing     Problem: HEMATOLOGIC - ADULT  Goal: Maintains hematologic stability  Description: INTERVENTIONS  - Assess for signs and symptoms of bleeding or hemorrhage  - Monitor labs  - Administer supportive blood products/factors as ordered and appropriate  Outcome: Progressing     Problem: MOBILITY - ADULT  Goal: Maintain or return to baseline ADL function  Description: INTERVENTIONS:  -  Assess patient's ability to carry out ADLs; assess patient's baseline for ADL function and identify physical deficits which impact ability to perform ADLs (bathing, care of mouth/teeth, toileting, grooming, dressing, etc )  - Assess/evaluate cause of self-care deficits   - Assess range of motion  - Assess patient's mobility; develop plan if impaired  - Assess patient's need for assistive devices and provide as appropriate  - Encourage maximum independence but intervene and supervise when necessary  - Involve family in performance of ADLs  - Assess for home care needs following discharge   - Consider OT consult to assist with ADL evaluation and planning for discharge  - Provide patient education as appropriate  Outcome: Progressing  Goal: Maintains/Returns to pre admission functional level  Description: INTERVENTIONS:  - Perform BMAT or MOVE assessment daily    - Set and communicate daily mobility goal to care team and patient/family/caregiver     - Collaborate with rehabilitation services on mobility goals if consulted  - Out of bed for toileting  - Record patient progress and toleration of activity level   Outcome: Progressing

## 2022-06-02 NOTE — ASSESSMENT & PLAN NOTE
58-year-old gentleman with diabetes mellitus presents with recurrent osteomyelitis of left foot  · Recently hospitalized and discharged with levofloxacin  · This admission underwent fifth ray resection  · Remains on zosyn per ID with plan to transition to oral fluoroquinolone tomorrow

## 2022-06-02 NOTE — PROGRESS NOTES
Podiatry - Progress Note  Patient: Kate Dickinson 59 y o  male   MRN: [id number]  PCP: Kathryn Mendiola MD  Unit/Bed#: E5 -09 Encounter: [id number]  Date Of Visit: 22    ASSESSMENT:    Kate Dickinson is a 59 y o  male with:    1  Diabetic ulceration left lateral 5th MTPJ with underlying osteomyelitis - [place] 3  1  S/p R partial 5th ray amputation (DOS 22)  2  Dorsal 2nd digit eschar, left foot  3  T2DM  4  VIVIANA on CKD2  5  Lumbar radiculopathy      PLAN:    · Post-surgical dressing located on surgical site and affected extremity were left intact today  · Will plan for first complete post-surgical dressing change tomorrow  · Pain is well controlled  Continue current pain management regimen  · Elevation on green foam wedges or pillows when non-ambulatory  · Rest of care per primary team     Weightbearing status: Nonweightbearing to LLE       SUBJECTIVE:     The patient was seen, evaluated, and assessed at bedside today  The patient was awake, alert, and in no acute distress  The patient reports moderate pain at this time  Pain is well controlled with current pain management regimen  Patient reports normal appetite and using the restroom postoperatively  Patient denies N/V/F/chills/SOB/CP  OBJECTIVE:     Vitals:   /78   Pulse 89   Temp 98 7 °F (37 1 °C)   Resp 18   Ht 6' (1 829 m)   SpO2 91%   BMI 35 80 kg/m²     Temp (24hrs), Av 9 °F (36 6 °C), Min:97 °F (36 1 °C), Max:98 7 °F (37 1 °C)      Physical Exam:     General:  Alert, cooperative, and in no distress  Lower extremity exam:  Cardiovascular status at baseline  Neurological status at baseline  Musculoskeletal status at baseline  No erythema, edema, or lymphangitis noted from dressing  Lower extremity temperature WNL       Additional Data:     Labs:    Results from last 7 days   Lab Units 22  0437   WBC Thousand/uL 5 74   HEMOGLOBIN g/dL 12 6   HEMATOCRIT % 38 5   PLATELETS Thousands/uL 205   NEUTROS PCT % 70   LYMPHS PCT % 13*   MONOS PCT % 13*   EOS PCT % 3     Results from last 7 days   Lab Units 06/02/22  0437 06/01/22  0451 05/31/22  1045   POTASSIUM mmol/L 5 0   < > 5 2   CHLORIDE mmol/L 101   < > 98*   CO2 mmol/L 25   < > 24   BUN mg/dL 44*   < > 49*   CREATININE mg/dL 1 46*   < > 2 29*   CALCIUM mg/dL 9 2   < > 9 3   ALK PHOS U/L  --   --  71   ALT U/L  --   --  23   AST U/L  --   --  16    < > = values in this interval not displayed  Results from last 7 days   Lab Units 05/31/22  1045   INR  1 14       * I Have Reviewed All Lab Data Listed Above  Recent Cultures (last 7 days):     Results from last 7 days   Lab Units 05/31/22  1054 05/31/22  1045   BLOOD CULTURE  No Growth at 24 hrs  No Growth at 24 hrs  Imaging: I have personally reviewed pertinent films in PACS  EKG, Pathology, and Other Studies: I have personally reviewed pertinent reports  ** Please Note: Portions of the record may have been created with voice recognition software  Occasional wrong word or "sound a like" substitutions may have occurred due to the inherent limitations of voice recognition software  Read the chart carefully and recognize, using context, where substitutions have occurred   **

## 2022-06-02 NOTE — PROGRESS NOTES
Progress Note - Infectious Disease   Antonia Herr 59 y o  male MRN: [id number]  Unit/Bed#: E5 -01 Encounter: [id number]    Impression/Plan:  1  Infected left diabetic foot ulcer with underlying osteomyelitis  Recurrent infection despite previous debridement and appropriate antibiotic therapy  Patient is s/p OR debridement on 4/28/22; no purulence or sinus tracts seen, underlying tissue and bone appeared healthy and a wound vac was placed  MRI did not show osteomyelitis  The OR culture grew a few colonies of Pseudomonas aeruginosa  The patient was clinically improving after source control, despite lack of antibiotic coverage  Antibiotics were switched to IV Zosyn, and then he was transitioned to PO levofloxacin at discharge to complete a 7 day total course  The patient had persistent ulceration with progression of infection and underlying 5th metatarsal/5th proximal phalanx osteomyelitis confirmed on MRI  He is now S/P 5th ray resection on 6/1/2022  Intraoperative cultures are pending  There is assumed surgical cure of osteomyelitis and there were no new sinus tracts noted  Postoperatively he was started on IV Zosyn which he is tolerating without difficulty  I will continue this for now while we await culture results  Anticipate transition back to Levaquin tomorrow if no resistatn pathogen is seen on culture  He will require 7-days total of post-op antibiotic treatment, through 6/8/2022, for soft tissue coverage   -continue IV Zosyn for now  -monitor CBC and BMP  -follow up OR cultures  -monitor vitals  -serial L foot exams  -local wound care per podiatry   -continue close follow up with podiatry     2  Type 2 diabetes mellitus without long term insulin use  Patient's last HbA1c was 6 6% on 4/26/2022  Elevated blood glucose is risk factor for infection  He has been refusing his insulin orders  Recommend tight glycemic control   -blood glucose management per primary service     3  Chronic kidney disease stage 3   Creatinine elevated on admission  Suspect this is prerenal  Creatinine has improved to 1 46 today   -monitor creatinine  -dose adjust antibiotics for renal function as needed  -avoid nephrotoxins     4  Morbid obesity  BMI = 35 8  Risk factor for wounds and infection  -recommend weight loss     5  Antibiotic allergies  Severe rash with cephalosporins, however he tolerates penicillins without difficulty  He has also tolerated zosyn and unasyn    -monitor patient for adverse medication reactions    Above plan was discussed in detail with patient at the bedside by ID attending  Above plan was discussed in detail with SLIM  Antibiotics:  Zosyn 2  Antibiotics 2    Subjective:  Patient refusing to speak with me, told me to leave  ID attending did stay at bedside to finish visit  I remained in patient's open doorway for duration of the visit with bedside RN  Please see physician attestation above for further information  Objective:  Vitals:  Temp:  [97 °F (36 1 °C)-98 4 °F (36 9 °C)] 98 4 °F (36 9 °C)  HR:  [73-91] 91  Resp:  [12-19] 18  BP: ()/(51-83) 137/81  SpO2:  [92 %-98 %] 95 %  Temp (24hrs), Av 5 °F (36 4 °C), Min:97 °F (36 1 °C), Max:98 4 °F (36 9 °C)  Current: Temperature: 98 4 °F (36 9 °C)    Physical Exam:   Patient refused to allow me to participate in visit, please see physician attestation above for further information      Labs, Imaging, & Other studies:   All pertinent labs and imaging studies were personally reviewed  Results from last 7 days   Lab Units 22  1045   WBC Thousand/uL 5 74 4 96 7 32   HEMOGLOBIN g/dL 12 6 13 2 12 7   PLATELETS Thousands/uL 205 209 246     Results from last 7 days   Lab Units 22  04322  1045   POTASSIUM mmol/L 5 0   < > 5 2   CHLORIDE mmol/L 101   < > 98*   CO2 mmol/L 25   < > 24   BUN mg/dL 44*   < > 49*   CREATININE mg/dL 1 46*   < > 2 29*   EGFR ml/min/1 73sq m 51   < > 30   CALCIUM mg/dL 9 2 < > 9 3   AST U/L  --   --  16   ALT U/L  --   --  23   ALK PHOS U/L  --   --  71    < > = values in this interval not displayed  Results from last 7 days   Lab Units 05/31/22  1054 05/31/22  1045   BLOOD CULTURE  No Growth at 24 hrs  No Growth at 24 hrs       Results from last 7 days   Lab Units 05/31/22  1045   PROCALCITONIN ng/ml 0 14     Results from last 7 days   Lab Units 05/31/22  1045   CRP mg/L 14 6*

## 2022-06-02 NOTE — ASSESSMENT & PLAN NOTE
Lab Results   Component Value Date    HGBA1C 6 6 (H) 04/26/2022     Recent Labs     06/01/22  1915 06/02/22  0708 06/02/22  1111 06/02/22  1606   POCGLU 159* 155* 177* 177*     · Continue sliding scale insulin

## 2022-06-02 NOTE — PHYSICAL THERAPY NOTE
Physical Therapy Cancellation Note- Attempted to see pt  Pt refused PT session citing sever pain, waiting for pain meds  Will try to see later today  Will continue to follow pt    Ed Youngre, PT

## 2022-06-02 NOTE — PLAN OF CARE
Problem: Potential for Falls  Goal: Patient will remain free of falls  Description: INTERVENTIONS:  - Educate patient/family on patient safety including physical limitations  - Instruct patient to call for assistance with activity   - Consult OT/PT to assist with strengthening/mobility   - Keep Call bell within reach  - Keep bed low and locked with side rails adjusted as appropriate  - Keep care items and personal belongings within reach  - Initiate and maintain comfort rounds  - Make Fall Risk Sign visible to staff  - Apply yellow socks and bracelet for high fall risk patients  - Consider moving patient to room near nurses station  Outcome: Progressing     Problem: PAIN - ADULT  Goal: Verbalizes/displays adequate comfort level or baseline comfort level  Description: Interventions:  - Encourage patient to monitor pain and request assistance  - Assess pain using appropriate pain scale  - Administer analgesics based on type and severity of pain and evaluate response  - Implement non-pharmacological measures as appropriate and evaluate response  - Consider cultural and social influences on pain and pain management  - Notify physician/advanced practitioner if interventions unsuccessful or patient reports new pain  Outcome: Progressing     Problem: INFECTION - ADULT  Goal: Absence or prevention of progression during hospitalization  Description: INTERVENTIONS:  - Assess and monitor for signs and symptoms of infection  - Monitor lab/diagnostic results  - Monitor all insertion sites, i e  indwelling lines, tubes, and drains  - Monitor endotracheal if appropriate and nasal secretions for changes in amount and color  - [place] appropriate cooling/warming therapies per order  - Administer medications as ordered  - Instruct and encourage patient and family to use good hand hygiene technique  - Identify and instruct in appropriate isolation precautions for identified infection/condition  Outcome: Progressing  Goal: Absence of fever/infection during neutropenic period  Description: INTERVENTIONS:  - Monitor WBC    Outcome: Progressing     Problem: SAFETY ADULT  Goal: Patient will remain free of falls  Description: INTERVENTIONS:  - Educate patient/family on patient safety including physical limitations  - Instruct patient to call for assistance with activity   - Consult OT/PT to assist with strengthening/mobility   - Keep Call bell within reach  - Keep bed low and locked with side rails adjusted as appropriate  - Keep care items and personal belongings within reach  - Initiate and maintain comfort rounds  - Make Fall Risk Sign visible to staff  - Apply yellow socks and bracelet for high fall risk patients  - Consider moving patient to room near nurses station  Outcome: Progressing  Goal: Maintain or return to baseline ADL function  Description: INTERVENTIONS:  -  Assess patient's ability to carry out ADLs; assess patient's baseline for ADL function and identify physical deficits which impact ability to perform ADLs (bathing, care of mouth/teeth, toileting, grooming, dressing, etc )  - Assess/evaluate cause of self-care deficits   - Assess range of motion  - Assess patient's mobility; develop plan if impaired  - Assess patient's need for assistive devices and provide as appropriate  - Encourage maximum independence but intervene and supervise when necessary  - Involve family in performance of ADLs  - Assess for home care needs following discharge   - Consider OT consult to assist with ADL evaluation and planning for discharge  - Provide patient education as appropriate  Outcome: Progressing  Goal: Maintains/Returns to pre admission functional level  Description: INTERVENTIONS:  - Perform BMAT or MOVE assessment daily    - Set and communicate daily mobility goal to care team and patient/family/caregiver     - Collaborate with rehabilitation services on mobility goals if consulted  - Out of bed for toileting  - Record patient progress and toleration of activity level   Outcome: Progressing     Problem: DISCHARGE PLANNING  Goal: Discharge to home or other facility with appropriate resources  Description: INTERVENTIONS:  - Identify barriers to discharge w/patient and caregiver  - Arrange for needed discharge resources and transportation as appropriate  - Identify discharge learning needs (meds, wound care, etc )  - Arrange for interpretive services to assist at discharge as needed  - Refer to Case Management Department for coordinating discharge planning if the patient needs post-hospital services based on physician/advanced practitioner order or complex needs related to functional status, cognitive ability, or social support system  Outcome: Progressing     Problem: Knowledge Deficit  Goal: Patient/family/caregiver demonstrates understanding of disease process, treatment plan, medications, and discharge instructions  Description: Complete learning assessment and assess knowledge base    Interventions:  - Provide teaching at level of understanding  - Provide teaching via preferred learning methods  Outcome: Progressing     Problem: METABOLIC, FLUID AND ELECTROLYTES - ADULT  Goal: Fluid balance maintained  Description: INTERVENTIONS:  - Monitor labs   - Monitor I/O and WT  - Instruct patient on fluid and nutrition as appropriate  - Assess for signs & symptoms of volume excess or deficit  Outcome: Progressing  Goal: Glucose maintained within target range  Description: INTERVENTIONS:  - Monitor Blood Glucose as ordered  - Assess for signs and symptoms of hyperglycemia and hypoglycemia  - Administer ordered medications to maintain glucose within target range  - Assess nutritional intake and initiate nutrition service referral as needed  Outcome: Progressing     Problem: HEMATOLOGIC - ADULT  Goal: Maintains hematologic stability  Description: INTERVENTIONS  - Assess for signs and symptoms of bleeding or hemorrhage  - Monitor labs  - Administer supportive blood products/factors as ordered and appropriate  Outcome: Progressing     Problem: MOBILITY - ADULT  Goal: Maintain or return to baseline ADL function  Description: INTERVENTIONS:  -  Assess patient's ability to carry out ADLs; assess patient's baseline for ADL function and identify physical deficits which impact ability to perform ADLs (bathing, care of mouth/teeth, toileting, grooming, dressing, etc )  - Assess/evaluate cause of self-care deficits   - Assess range of motion  - Assess patient's mobility; develop plan if impaired  - Assess patient's need for assistive devices and provide as appropriate  - Encourage maximum independence but intervene and supervise when necessary  - Involve family in performance of ADLs  - Assess for home care needs following discharge   - Consider OT consult to assist with ADL evaluation and planning for discharge  - Provide patient education as appropriate  Outcome: Progressing  Goal: Maintains/Returns to pre admission functional level  Description: INTERVENTIONS:  - Perform BMAT or MOVE assessment daily    - Set and communicate daily mobility goal to care team and patient/family/caregiver     - Collaborate with rehabilitation services on mobility goals if consulted  - Out of bed for toileting  - Record patient progress and toleration of activity level   Outcome: Progressing

## 2022-06-02 NOTE — PROGRESS NOTES
[address] [place] [address]  Progress Note Cloyce Perish 1965, 59 y o  male MRN: [id number]  Unit/Bed#: E5 -01 Encounter: [id number]  Primary Care Provider: Angel Montoya MD   Date and time admitted to hospital: 5/31/2022  9:47 AM    * Diabetic ulcer of left foot [facility])  Assessment & Plan  47-year-old gentleman with diabetes mellitus presents with recurrent osteomyelitis of left foot  · Recently hospitalized and discharged with levofloxacin  · This admission underwent fifth ray resection  · Remains on zosyn per ID with plan to transition to oral fluoroquinolone tomorrow    Acute kidney injury superimposed on chronic kidney disease ([facility] [address] )  Assessment & Plan  · VIVIANA on CKD 3 secondary to acute infection  · Continues to improve with IV fluids  Continue for additional day    Results from last 7 days   Lab Units 06/02/22  0437 06/01/22  0451 05/31/22  1045   BUN mg/dL 44* 54* 49*   CREATININE mg/dL 1 46* 1 99* 2 29*   EGFR ml/min/1 73sq m 46 35 30       H/O eye surgery  Assessment & Plan  · Follows with Dr Adri Epperson ([phone])  · Was supposed to have sutures removed but patient has not followed up yet  · Continue eyedrops but advised need for close follow-up    Obesity  Assessment & Plan  · Body mass index is 35 8 kg/m²  Diabetes mellitus [facility])  Assessment & Plan  Lab Results   Component Value Date    HGBA1C 6 6 (H) 04/26/2022     Recent Labs     06/01/22  1915 06/02/22  0708 06/02/22  1111 06/02/22  1606   POCGLU 159* 155* 177* 177*     · Continue sliding scale insulin    Essential hypertension  Assessment & Plan  · Continue carvedilol and lisinopril/HCT unless renal function were to worsen      VTE Pharmacologic Prophylaxis: VTE Score: 3 Moderate Risk (Score 3-4) - Pharmacological DVT Prophylaxis Ordered: Heparin  Patient Centered Rounds: I have performed bedside rounds with nursing staff today    Discussions with Specialists or Other Care Team Provider:  Case management and ID    Education and Discussions with Family / Patient:     Time Spent for Care: 20 mins  More than 50% of total time spent on counseling and coordination of care as described above  Current Length of Stay: 2 day(s)  Current Patient Status: Inpatient   Certification Statement: The patient will continue to require additional inpatient hospital stay due to antibiotics  Discharge Plan / Estimated Discharge Date: Hopefully next 48 hours when cleared by podiatry    Code Status: Level 3 - DNAR and DNI      Subjective:   Patient seen and examined  No new complaints, still having some pain    Objective:   Vitals: Blood pressure 121/82, pulse 87, temperature 98 °F (36 7 °C), resp  rate 18, height 6' (1 829 m), SpO2 93 %  Physical Exam  Vitals reviewed  Constitutional:       General: He is not in acute distress  HENT:      Head: Atraumatic  Cardiovascular:      Rate and Rhythm: Regular rhythm  Heart sounds: Normal heart sounds  Pulmonary:      Effort: Pulmonary effort is normal       Breath sounds: Decreased breath sounds present  No wheezing  Abdominal:      General: Bowel sounds are normal       Palpations: Abdomen is soft  Tenderness: There is no abdominal tenderness  There is no rebound  Musculoskeletal:         General: Deformity (Left foot wrapped) present  No swelling or tenderness  Skin:     General: Skin is warm  Neurological:      General: No focal deficit present  Mental Status: He is alert  Cranial Nerves: No cranial nerve deficit     Psychiatric:         Mood and Affect: Mood normal        Additional Data:   Labs:  Results from last 7 days   Lab Units 06/02/22 0437 06/01/22 0451 05/31/22  1045   WBC Thousand/uL 5 74 4 96 7 32   HEMOGLOBIN g/dL 12 6 13 2 12 7   HEMATOCRIT % 38 5 39 9 38 6   MCV fL 94 94 91   PLATELETS Thousands/uL 205 209 246   INR   --   --  1 14     Results from last 7 days   Lab Units 06/02/22 0437 06/01/22 0451 05/31/22  1045   SODIUM mmol/L 135* 135* 134*   POTASSIUM mmol/L 5 0 4 4 5 2   CHLORIDE mmol/L 101 100 98*   CO2 mmol/L 25 24 24   ANION GAP mmol/L 9 11 12   BUN mg/dL 44* 54* 49*   CREATININE mg/dL 1 46* 1 99* 2 29*   CALCIUM mg/dL 9 2 9 2 9 3   ALBUMIN g/dL  --   --  3 4*   TOTAL BILIRUBIN mg/dL  --   --  0 29   ALK PHOS U/L  --   --  71   ALT U/L  --   --  23   AST U/L  --   --  16   EGFR ml/min/1 73sq m 51 35 30   GLUCOSE RANDOM mg/dL 126 144* 196*          Results from last 7 days   Lab Units 05/31/22  1045   LACTIC ACID mmol/L 1 0   PROCALCITONIN ng/ml 0 14     Results from last 7 days   Lab Units 06/02/22  1606 06/02/22  1111 06/02/22  0708 06/01/22  1915 06/01/22  1557 06/01/22  1209 06/01/22  0751 05/31/22  2123 05/31/22  1613   POC GLUCOSE mg/dl 177* 177* 155* 159* 162* 154* 146* 168* 163*             * I Have Reviewed All Lab Data Listed Above  Cultures:   Results from last 7 days   Lab Units 06/01/22  1814 05/31/22  1054 05/31/22  1045   BLOOD CULTURE   --  No Growth at 48 hrs  No Growth at 48 hrs  GRAM STAIN RESULT  Rare Polys  No organisms seen  --   --                  Lines/Drains:  Invasive Devices  Report    Peripheral Intravenous Line  Duration           Peripheral IV 05/31/22 Left Antecubital 2 days    Peripheral IV 05/31/22 Right Forearm 2 days              Telemetry:      Imaging:  Imaging Reports Reviewed Today Include:   XR foot left 3+ views    Result Date: 6/2/2022  Impression: Interval resection of 5th ray to the level of the metatarsal base  Workstation performed: [id number]     XR foot 3+ views LEFT    Result Date: 5/31/2022  Impression: Bony destruction in the head of the 5th metatarsal with a pathologic fracture in keeping with osteomyelitis which appears worse compared to the most recent study  There is questionable erosion of the proximal, lateral aspect of the proximal phalanx of the 5th toe  This raises concern for the presence of septic arthritis   Workstation performed: [id number]     MRI foot/forefoot toes left wo contrast    Result Date: 6/1/2022  Impression: Acute osteomyelitis with associated fracture involving the fifth metatarsal head and shaft  Findings suspicious for early acute osteomyelitis involving the base of the fifth proximal phalanx  Soft tissue ulceration along the lateral aspect of the fifth metatarsal head with a sinus tract extending into the fifth metatarsal head  The study was marked in [facility] for immediate notification  Workstation performed: [id number]       Scheduled Meds:  Current Facility-Administered Medications   Medication Dose Route Frequency Provider Last Rate    acetaminophen  650 mg Oral Q6H PRN Guillermina Rod, DPM      ALPRAZolam  1 mg Oral Once in imaging Guillermina TRE Velasco      vitamin C  1,000 mg Oral BID Guillermina TRE Velasco      carvedilol  25 mg Oral BID With Meals Guillerminascout Velasco DPM      cyclobenzaprine  5 mg Oral BID PRN Patricia Min PA-C      glycerin-hypromellose-  1 drop Both Eyes Q4H PRN Guillermina Velasco DPPEREZ      heparin (porcine)  5,000 Units Subcutaneous [facility] Nikky Russell      HYDROmorphone  0 5 mg Intravenous Q4H PRN Patricia Min PA-C      insulin lispro  2-12 Units Subcutaneous TID AC Guillermina Velasco DPM      lisinopril-hydrochlorothiazide (PRINZIDE 20/12  5) combo dose   Oral BID Guillerminascout Velasco, DPM      Loteprednol Etabonate  1 drop Ophthalmic BID Betty Pastor DO      oxyCODONE  5 mg Oral Q6H PRN Patricia Min PA-C      piperacillin-tazobactam  3 375 g Intravenous Q6H Guillermina Velasco, DPM 3 375 g (06/02/22 1423)    sodium chloride  75 mL/hr Intravenous Continuous JODY CabralC 75 mL/hr (06/02/22 1424)    traMADol  50 mg Oral Q6H PRN Guillermina Rod, DPPEREZ      vitamin E (tocopherol)  400 Units Oral Daily Guillermina Rod, TRE      zinc sulfate  220 mg Oral Daily Guillermina Rod, TRE         Today, Patient Was Seen By: Betty Pastor DO    ** Please Note: Dictation voice to text software may have been used in the creation of this document   **

## 2022-06-02 NOTE — OCCUPATIONAL THERAPY NOTE
Occupational Therapy Note:    Patient Name: Mary Adrian  LCBPI'B Date: 6/2/2022    OT consult received  Chart reviewed  Attempted to see pt for OT eval this AM, however pt declining 2* increased pain  Pt requested to defer therapy evals until he received pain medication  Will attempt at later time as schedule permits      RIVAS Casey/FERNANDO

## 2022-06-03 LAB
ALBUMIN SERPL BCP-MCNC: 2.8 G/DL (ref 3.5–5)
ALP SERPL-CCNC: 60 U/L (ref 46–116)
ALT SERPL W P-5'-P-CCNC: 20 U/L (ref 12–78)
ANION GAP SERPL CALCULATED.3IONS-SCNC: 10 MMOL/L (ref 4–13)
AST SERPL W P-5'-P-CCNC: 10 U/L (ref 5–45)
BILIRUB SERPL-MCNC: 0.39 MG/DL (ref 0.2–1)
BUN SERPL-MCNC: 46 MG/DL (ref 5–25)
CALCIUM ALBUM COR SERPL-MCNC: 9.3 MG/DL (ref 8.3–10.1)
CALCIUM SERPL-MCNC: 8.3 MG/DL (ref 8.3–10.1)
CHLORIDE SERPL-SCNC: 104 MMOL/L (ref 100–108)
CO2 SERPL-SCNC: 23 MMOL/L (ref 21–32)
CREAT SERPL-MCNC: 1.94 MG/DL (ref 0.6–1.3)
ERYTHROCYTE [DISTWIDTH] IN BLOOD BY AUTOMATED COUNT: 12.4 % (ref 11.6–15.1)
GFR SERPL CREATININE-BSD FRML MDRD: 36 ML/MIN/1.73SQ M
GLUCOSE SERPL-MCNC: 149 MG/DL (ref 65–140)
GLUCOSE SERPL-MCNC: 150 MG/DL (ref 65–140)
GLUCOSE SERPL-MCNC: 156 MG/DL (ref 65–140)
GLUCOSE SERPL-MCNC: 238 MG/DL (ref 65–140)
HCT VFR BLD AUTO: 33.6 % (ref 36.5–49.3)
HGB BLD-MCNC: 10.9 G/DL (ref 12–17)
MCH RBC QN AUTO: 30.9 PG (ref 26.8–34.3)
MCHC RBC AUTO-ENTMCNC: 32.4 G/DL (ref 31.4–37.4)
MCV RBC AUTO: 95 FL (ref 82–98)
PLATELET # BLD AUTO: 188 THOUSANDS/UL (ref 149–390)
PMV BLD AUTO: 9.7 FL (ref 8.9–12.7)
POTASSIUM SERPL-SCNC: 4.8 MMOL/L (ref 3.5–5.3)
PROT SERPL-MCNC: 7 G/DL (ref 6.4–8.2)
RBC # BLD AUTO: 3.53 MILLION/UL (ref 3.88–5.62)
SODIUM SERPL-SCNC: 137 MMOL/L (ref 136–145)
WBC # BLD AUTO: 5.7 THOUSAND/UL (ref 4.31–10.16)

## 2022-06-03 PROCEDURE — 99232 SBSQ HOSP IP/OBS MODERATE 35: CPT | Performed by: INTERNAL MEDICINE

## 2022-06-03 PROCEDURE — 82948 REAGENT STRIP/BLOOD GLUCOSE: CPT

## 2022-06-03 PROCEDURE — 85027 COMPLETE CBC AUTOMATED: CPT | Performed by: INTERNAL MEDICINE

## 2022-06-03 PROCEDURE — 80053 COMPREHEN METABOLIC PANEL: CPT | Performed by: INTERNAL MEDICINE

## 2022-06-03 RX ADMIN — LOTEPREDNOL ETABONATE 1 DROP: 10 SUSPENSION TOPICAL at 07:47

## 2022-06-03 RX ADMIN — Medication 400 UNITS: at 07:52

## 2022-06-03 RX ADMIN — HYDROMORPHONE HYDROCHLORIDE 0.5 MG: 1 INJECTION, SOLUTION INTRAMUSCULAR; INTRAVENOUS; SUBCUTANEOUS at 02:53

## 2022-06-03 RX ADMIN — PIPERACILLIN AND TAZOBACTAM 3.38 G: 3; .375 INJECTION, POWDER, LYOPHILIZED, FOR SOLUTION INTRAVENOUS at 15:18

## 2022-06-03 RX ADMIN — PIPERACILLIN AND TAZOBACTAM 3.38 G: 3; .375 INJECTION, POWDER, LYOPHILIZED, FOR SOLUTION INTRAVENOUS at 09:00

## 2022-06-03 RX ADMIN — PIPERACILLIN AND TAZOBACTAM 3.38 G: 3; .375 INJECTION, POWDER, LYOPHILIZED, FOR SOLUTION INTRAVENOUS at 20:28

## 2022-06-03 RX ADMIN — CARVEDILOL 25 MG: 25 TABLET, FILM COATED ORAL at 07:47

## 2022-06-03 RX ADMIN — LOTEPREDNOL ETABONATE 1 DROP: 10 SUSPENSION TOPICAL at 20:18

## 2022-06-03 RX ADMIN — HYDROMORPHONE HYDROCHLORIDE 0.5 MG: 1 INJECTION, SOLUTION INTRAMUSCULAR; INTRAVENOUS; SUBCUTANEOUS at 16:37

## 2022-06-03 RX ADMIN — CYCLOBENZAPRINE HYDROCHLORIDE 5 MG: 10 TABLET, FILM COATED ORAL at 12:04

## 2022-06-03 RX ADMIN — GLYCERIN, HYPROMELLOSE, POLYETHYLENE GLYCOL 1 DROP: .2; .2; 1 LIQUID OPHTHALMIC at 17:39

## 2022-06-03 RX ADMIN — OXYCODONE HYDROCHLORIDE 10 MG: 10 TABLET ORAL at 07:43

## 2022-06-03 RX ADMIN — CARVEDILOL 25 MG: 25 TABLET, FILM COATED ORAL at 16:33

## 2022-06-03 RX ADMIN — OXYCODONE HYDROCHLORIDE 10 MG: 10 TABLET ORAL at 12:00

## 2022-06-03 RX ADMIN — GLYCERIN, HYPROMELLOSE, POLYETHYLENE GLYCOL 1 DROP: .2; .2; 1 LIQUID OPHTHALMIC at 02:58

## 2022-06-03 RX ADMIN — HYDROMORPHONE HYDROCHLORIDE 0.5 MG: 1 INJECTION, SOLUTION INTRAMUSCULAR; INTRAVENOUS; SUBCUTANEOUS at 09:00

## 2022-06-03 RX ADMIN — HYDROCHLOROTHIAZIDE: 12.5 TABLET ORAL at 07:44

## 2022-06-03 RX ADMIN — HYDROMORPHONE HYDROCHLORIDE 0.5 MG: 1 INJECTION, SOLUTION INTRAMUSCULAR; INTRAVENOUS; SUBCUTANEOUS at 22:03

## 2022-06-03 RX ADMIN — OXYCODONE HYDROCHLORIDE 10 MG: 10 TABLET ORAL at 20:21

## 2022-06-03 RX ADMIN — OXYCODONE HYDROCHLORIDE AND ACETAMINOPHEN 1000 MG: 500 TABLET ORAL at 20:20

## 2022-06-03 RX ADMIN — PIPERACILLIN AND TAZOBACTAM 3.38 G: 3; .375 INJECTION, POWDER, LYOPHILIZED, FOR SOLUTION INTRAVENOUS at 02:53

## 2022-06-03 RX ADMIN — ZINC SULFATE 220 MG (50 MG) CAPSULE 220 MG: CAPSULE at 07:51

## 2022-06-03 RX ADMIN — OXYCODONE HYDROCHLORIDE AND ACETAMINOPHEN 1000 MG: 500 TABLET ORAL at 07:44

## 2022-06-03 RX ADMIN — SODIUM CHLORIDE 75 ML/HR: 0.9 INJECTION, SOLUTION INTRAVENOUS at 02:53

## 2022-06-03 NOTE — ASSESSMENT & PLAN NOTE
· VIVIANA on CKD 3 secondary to acute infection  · Did improve with IV fluids    Continue for additional day  · Will discontinue lisinopril/HCT    Results from last 7 days   Lab Units 06/03/22  0511 06/02/22  0437 06/01/22  0451 05/31/22  1045   BUN mg/dL 46* 44* 54* 49*   CREATININE mg/dL 1 94* 1 46* 1 99* 2 29*   EGFR ml/min/1 73sq m 36 51 35 30

## 2022-06-03 NOTE — ASSESSMENT & PLAN NOTE
78-year-old gentleman with diabetes mellitus presents with recurrent osteomyelitis of left foot  · Recently hospitalized and discharged with levofloxacin  · This admission underwent fifth ray resection  · Remains on zosyn per ID with plan to transition to oral fluoroquinolone at time of discharge

## 2022-06-03 NOTE — PROGRESS NOTES
[address] [place] [address]  Progress Note Abel Montes 1965, 59 y o  male MRN: [id number]  Unit/Bed#: E5 -01 Encounter: [id number]  Primary Care Provider: Dina Stark MD   Date and time admitted to hospital: 5/31/2022  9:47 AM    * Diabetic ulcer of left foot [facility])  Assessment & Plan  57-year-old gentleman with diabetes mellitus presents with recurrent osteomyelitis of left foot  · Recently hospitalized and discharged with levofloxacin  · This admission underwent fifth ray resection  · Remains on zosyn per ID with plan to transition to oral fluoroquinolone at time of discharge    Acute kidney injury superimposed on chronic kidney disease ([facility] [address] )  Assessment & Plan  · IVVIANA on CKD 3 secondary to acute infection  · Did improve with IV fluids  Continue for additional day  · Will discontinue lisinopril/HCT    Results from last 7 days   Lab Units 06/03/22  0511 06/02/22  0437 06/01/22  0451 05/31/22  1045   BUN mg/dL 46* 44* 54* 49*   CREATININE mg/dL 1 94* 1 46* 1 99* 2 29*   EGFR ml/min/1 73sq m 36 46 35 30       H/O eye surgery  Assessment & Plan  · Follows with Dr Gema Licea ([phone])  · Was supposed to have sutures removed but patient has not followed up yet  · Continue eyedrops but advised need for close follow-up    Obesity  Assessment & Plan  · Body mass index is 35 88 kg/m²  Diabetes mellitus [facility])  Assessment & Plan  Lab Results   Component Value Date    HGBA1C 6 6 (H) 04/26/2022     Recent Labs     06/02/22  1111 06/02/22  1606 06/03/22  0714 06/03/22  1105   POCGLU 177* 177* 150* 238*     · Continue sliding scale insulin    Essential hypertension  Assessment & Plan  · Continue carvedilol but discontinue lisinopril/HCT given worsening renal function      VTE Pharmacologic Prophylaxis: VTE Score: 3 Moderate Risk (Score 3-4) - Pharmacological DVT Prophylaxis Ordered: Heparin  Patient Centered Rounds: I have performed bedside rounds with nursing staff today    Discussions with Specialists or Other Care Team Provider:  Case management    Education and Discussions with Family / Patient:  Discussed with spouse on telephone    Time Spent for Care: 20 mins  More than 50% of total time spent on counseling and coordination of care as described above  Current Length of Stay: 3 day(s)  Current Patient Status: Inpatient   Certification Statement: The patient will continue to require additional inpatient hospital stay due to awaiting podiatric clearance  Discharge Plan / Estimated Discharge Date: Medically stable awaiting cleared from podiatry for discharge home    Code Status: Level 3 - DNAR and DNI      Subjective:   Patient seen and examined  No new complaints, pain better controlled  Objective:   Vitals: Blood pressure 113/70, pulse 90, temperature 99 °F (37 2 °C), resp  rate 18, height 6' (1 829 m), weight 120 kg (264 lb 8 8 oz), SpO2 93 %  Physical Exam  Vitals reviewed  Constitutional:       General: He is not in acute distress  HENT:      Head: Atraumatic  Cardiovascular:      Rate and Rhythm: Regular rhythm  Heart sounds: Normal heart sounds  Pulmonary:      Effort: Pulmonary effort is normal       Breath sounds: Decreased breath sounds present  No wheezing  Abdominal:      General: Bowel sounds are normal       Palpations: Abdomen is soft  Tenderness: There is no abdominal tenderness  There is no rebound  Musculoskeletal:         General: Deformity (Left foot wrapped) present  No swelling or tenderness  Skin:     General: Skin is warm and dry  Neurological:      Mental Status: He is alert  Cranial Nerves: No cranial nerve deficit     Psychiatric:         Mood and Affect: Mood normal        Additional Data:   Labs:  Results from last 7 days   Lab Units 06/03/22  0511 06/02/22  0437 06/01/22  0451 05/31/22  1045   WBC Thousand/uL 5 70 5 74 4 96 7 32   HEMOGLOBIN g/dL 10 9* 12 6 13 2 12 7   HEMATOCRIT % 33 6* 38 5 39 9 38 6   MCV fL 95 94 94 91 PLATELETS Thousands/uL 188 205 209 246   INR   --   --   --  1 14     Results from last 7 days   Lab Units 06/03/22  0511 06/02/22  0437 06/01/22  0451 05/31/22  1045   SODIUM mmol/L 137 135* 135* 134*   POTASSIUM mmol/L 4 8 5 0 4 4 5 2   CHLORIDE mmol/L 104 101 100 98*   CO2 mmol/L 23 25 24 24   ANION GAP mmol/L 10 9 11 12   BUN mg/dL 46* 44* 54* 49*   CREATININE mg/dL 1 94* 1 46* 1 99* 2 29*   CALCIUM mg/dL 8 3 9 2 9 2 9 3   ALBUMIN g/dL 2 8*  --   --  3 4*   TOTAL BILIRUBIN mg/dL 0 39  --   --  0 29   ALK PHOS U/L 60  --   --  71   ALT U/L 20  --   --  23   AST U/L 10  --   --  16   EGFR ml/min/1 73sq m 36 51 35 30   GLUCOSE RANDOM mg/dL 149* 126 144* 196*                      Results from last 7 days   Lab Units 05/31/22  1045   LACTIC ACID mmol/L 1 0   PROCALCITONIN ng/ml 0 14     Results from last 7 days   Lab Units 06/03/22  1105 06/03/22  0714 06/02/22  1606 06/02/22  1111 06/02/22  0708 06/01/22  1915 06/01/22  1557 06/01/22  1209 06/01/22  0751 05/31/22  2123 05/31/22  1613   POC GLUCOSE mg/dl 238* 150* 177* 177* 155* 159* 162* 154* 146* 168* 163*             * I Have Reviewed All Lab Data Listed Above  Cultures:   Results from last 7 days   Lab Units 06/01/22  1814 05/31/22  1054 05/31/22  1045   BLOOD CULTURE   --  No Growth at 48 hrs  No Growth at 48 hrs  GRAM STAIN RESULT  Rare Polys  No organisms seen  --   --                  Lines/Drains:  Invasive Devices  Report    Peripheral Intravenous Line  Duration           Peripheral IV 05/31/22 Left Antecubital 3 days    Peripheral IV 05/31/22 Right Forearm 3 days              Telemetry:      Imaging:  Imaging Reports Reviewed Today Include:   XR foot left 3+ views    Result Date: 6/2/2022  Impression: Interval resection of 5th ray to the level of the metatarsal base   Workstation performed: [id number]     XR foot 3+ views LEFT    Result Date: 5/31/2022  Impression: Bony destruction in the head of the 5th metatarsal with a pathologic fracture in keeping with osteomyelitis which appears worse compared to the most recent study  There is questionable erosion of the proximal, lateral aspect of the proximal phalanx of the 5th toe  This raises concern for the presence of septic arthritis  Workstation performed: [id number]     MRI foot/forefoot toes left wo contrast    Result Date: 6/1/2022  Impression: Acute osteomyelitis with associated fracture involving the fifth metatarsal head and shaft  Findings suspicious for early acute osteomyelitis involving the base of the fifth proximal phalanx  Soft tissue ulceration along the lateral aspect of the fifth metatarsal head with a sinus tract extending into the fifth metatarsal head  The study was marked in [facility] for immediate notification  Workstation performed: [id number]       Scheduled Meds:  Current Facility-Administered Medications   Medication Dose Route Frequency Provider Last Rate    acetaminophen  650 mg Oral Q6H PRN Ariadne Quintero DPM      ALPRAZolam  1 mg Oral Once in imaging Ariadne Quintero DPM      vitamin C  1,000 mg Oral BID Ariadne Quintero DPM      carvedilol  25 mg Oral BID With Meals Ariadne Quintero DPM      cyclobenzaprine  5 mg Oral BID PRN Rancho Dai PA-C      glycerin-hypromellose-  1 drop Both Eyes Q4H PRN Ariadne Quintero DPM      heparin (porcine)  5,000 Units Subcutaneous [facility] Nikky James      HYDROmorphone  0 5 mg Intravenous Q4H PRN Rancho Dai PA-C      insulin lispro  2-12 Units Subcutaneous TID AC Ariadne Quintero DPM      lisinopril-hydrochlorothiazide (PRINZIDE 20/12  5) combo dose   Oral BID Ariadne Quintero DPM      Loteprednol Etabonate  1 drop Ophthalmic BID Nessa Cardoso, DO      oxyCODONE  10 mg Oral Q4H PRN Nessa Cardoso, DO      oxyCODONE  5 mg Oral Q4H PRN Nessa Cardoso, DO      piperacillin-tazobactam  3 375 g Intravenous Q6H Ariadne Quintero DPM 3 375 g (06/03/22 1518)    sodium chloride  75 mL/hr Intravenous Continuous Jackie Lyons PA-C 75 mL/hr (06/03/22 0253)    vitamin E (tocopherol)  400 Units Oral Daily Capri Sans, DPM      zinc sulfate  220 mg Oral Daily Capri Sans, DPM         Today, Patient Was Seen By: Acacia Banks DO    ** Please Note: Dictation voice to text software may have been used in the creation of this document   **

## 2022-06-03 NOTE — ASSESSMENT & PLAN NOTE
· Follows with Dr Jelani Castillo ([phone])  · Was supposed to have sutures removed but patient has not followed up yet    · Continue eyedrops but advised need for close follow-up

## 2022-06-03 NOTE — PLAN OF CARE
Problem: Potential for Falls  Goal: Patient will remain free of falls  Description: INTERVENTIONS:  - Educate patient/family on patient safety including physical limitations  - Instruct patient to call for assistance with activity   - Consult OT/PT to assist with strengthening/mobility   - Keep Call bell within reach  - Keep bed low and locked with side rails adjusted as appropriate  - Keep care items and personal belongings within reach  - Initiate and maintain comfort rounds  - Make Fall Risk Sign visible to staff  - Apply yellow socks and bracelet for high fall risk patients  - Consider moving patient to room near nurses station  Outcome: Progressing     Problem: PAIN - ADULT  Goal: Verbalizes/displays adequate comfort level or baseline comfort level  Description: Interventions:  - Encourage patient to monitor pain and request assistance  - Assess pain using appropriate pain scale  - Administer analgesics based on type and severity of pain and evaluate response  - Implement non-pharmacological measures as appropriate and evaluate response  - Consider cultural and social influences on pain and pain management  - Notify physician/advanced practitioner if interventions unsuccessful or patient reports new pain  Outcome: Progressing     Problem: INFECTION - ADULT  Goal: Absence or prevention of progression during hospitalization  Description: INTERVENTIONS:  - Assess and monitor for signs and symptoms of infection  - Monitor lab/diagnostic results  - Monitor all insertion sites, i e  indwelling lines, tubes, and drains  - Monitor endotracheal if appropriate and nasal secretions for changes in amount and color  - [place] appropriate cooling/warming therapies per order  - Administer medications as ordered  - Instruct and encourage patient and family to use good hand hygiene technique  - Identify and instruct in appropriate isolation precautions for identified infection/condition  Outcome: Progressing  Goal: Absence of fever/infection during neutropenic period  Description: INTERVENTIONS:  - Monitor WBC    Outcome: Progressing     Problem: SAFETY ADULT  Goal: Patient will remain free of falls  Description: INTERVENTIONS:  - Educate patient/family on patient safety including physical limitations  - Instruct patient to call for assistance with activity   - Consult OT/PT to assist with strengthening/mobility   - Keep Call bell within reach  - Keep bed low and locked with side rails adjusted as appropriate  - Keep care items and personal belongings within reach  - Initiate and maintain comfort rounds  - Make Fall Risk Sign visible to staff  - Apply yellow socks and bracelet for high fall risk patients  - Consider moving patient to room near nurses station  Outcome: Progressing  Goal: Maintain or return to baseline ADL function  Description: INTERVENTIONS:  -  Assess patient's ability to carry out ADLs; assess patient's baseline for ADL function and identify physical deficits which impact ability to perform ADLs (bathing, care of mouth/teeth, toileting, grooming, dressing, etc )  - Assess/evaluate cause of self-care deficits   - Assess range of motion  - Assess patient's mobility; develop plan if impaired  - Assess patient's need for assistive devices and provide as appropriate  - Encourage maximum independence but intervene and supervise when necessary  - Involve family in performance of ADLs  - Assess for home care needs following discharge   - Consider OT consult to assist with ADL evaluation and planning for discharge  - Provide patient education as appropriate  Outcome: Progressing  Goal: Maintains/Returns to pre admission functional level  Description: INTERVENTIONS:  - Perform BMAT or MOVE assessment daily    - Set and communicate daily mobility goal to care team and patient/family/caregiver     - Collaborate with rehabilitation services on mobility goals if consulted  - Out of bed for toileting  - Record patient progress and toleration of activity level   Outcome: Progressing     Problem: DISCHARGE PLANNING  Goal: Discharge to home or other facility with appropriate resources  Description: INTERVENTIONS:  - Identify barriers to discharge w/patient and caregiver  - Arrange for needed discharge resources and transportation as appropriate  - Identify discharge learning needs (meds, wound care, etc )  - Arrange for interpretive services to assist at discharge as needed  - Refer to Case Management Department for coordinating discharge planning if the patient needs post-hospital services based on physician/advanced practitioner order or complex needs related to functional status, cognitive ability, or social support system  Outcome: Progressing     Problem: Knowledge Deficit  Goal: Patient/family/caregiver demonstrates understanding of disease process, treatment plan, medications, and discharge instructions  Description: Complete learning assessment and assess knowledge base    Interventions:  - Provide teaching at level of understanding  - Provide teaching via preferred learning methods  Outcome: Progressing     Problem: METABOLIC, FLUID AND ELECTROLYTES - ADULT  Goal: Fluid balance maintained  Description: INTERVENTIONS:  - Monitor labs   - Monitor I/O and WT  - Instruct patient on fluid and nutrition as appropriate  - Assess for signs & symptoms of volume excess or deficit  Outcome: Progressing  Goal: Glucose maintained within target range  Description: INTERVENTIONS:  - Monitor Blood Glucose as ordered  - Assess for signs and symptoms of hyperglycemia and hypoglycemia  - Administer ordered medications to maintain glucose within target range  - Assess nutritional intake and initiate nutrition service referral as needed  Outcome: Progressing     Problem: HEMATOLOGIC - ADULT  Goal: Maintains hematologic stability  Description: INTERVENTIONS  - Assess for signs and symptoms of bleeding or hemorrhage  - Monitor labs  - Administer supportive blood products/factors as ordered and appropriate  Outcome: Progressing     Problem: MOBILITY - ADULT  Goal: Maintain or return to baseline ADL function  Description: INTERVENTIONS:  -  Assess patient's ability to carry out ADLs; assess patient's baseline for ADL function and identify physical deficits which impact ability to perform ADLs (bathing, care of mouth/teeth, toileting, grooming, dressing, etc )  - Assess/evaluate cause of self-care deficits   - Assess range of mo  - Assess patient's mobility; develop plan if impaired  - Assess patient's need for assistive devices and provide as appropriate  - Encourage maximum independence but intervene and supervise when necessary  - Involve family in performance of ADLs  - Assess for home care needs following discharge   - Consider OT consult to assist with ADL evaluation and planning for discharge  - Provide patient education as appropriate  Outcome: Progressing  Goal: Maintains/Returns to pre admission functional level  Description: INTERVENTIONS:  - Perform BMAT or MOVE assessment daily    - Set and communicate daily mobility goal to care team and patient/family/caregiver     - Collaborate with rehabilitation services on mobility goals if consulted  - Out of bed for toileting  - Record patient progress and toleration of activity level   Outcome: Progressing

## 2022-06-03 NOTE — OCCUPATIONAL THERAPY NOTE
Occupational Therapy Screen:    Patient Name: Shanda Jesus  PDXHD'B Date: 6/3/2022    OT consult received  Chart reviewed  Per PT, pt Mod I for functional transfers/mobility, able to maintain NWB L LE throughout  Spoke to pt  Pt denies any concerns regarding performing ADLs and returning home when medically cleared  No acute skilled OT needs identified at this time  Will D/C OT  Please re-consult as needed      Brittni Holland, OTR/L

## 2022-06-03 NOTE — CASE MANAGEMENT
Case Management Assessment & Discharge Planning Note    Patient name Dora Santillan  Location [address] [place] [address]  [phone]-* MRN [id number]  : 1965 Date 6/3/2022       Current Admission Date: 2022  Current Admission Diagnosis:Diabetic ulcer of left foot [facility])   Patient Active Problem List    Diagnosis Date Noted    Acute kidney injury superimposed on chronic kidney disease ([facility] ) 2022    Diabetic ulcer of left foot ([facility] ) 2022    Pre-operative clearance 2022    H/O eye surgery 2022    Diabetic foot ulcer ([facility] ) 2022    Dog bite of arm, right, sequela 2022    Cellulitis 2022    [facility] 20[facility]    Erectile dysfunction 2021    Neuropathy 2021    Low back pain with sciatica 2021    Neck pain 2021    Lumbar radiculopathy 2021    Cervical radiculopathy 2021    Myofascial pain 2021    Obesity 2021    Stage 2 chronic kidney disease 2021    Essential hypertension     Diabetes mellitus ([facility] )       LOS (days): 3  Geometric Mean LOS (GMLOS) (days): 5 80  Days to GMLOS:2 8     OBJECTIVE:  PATIENT READMITTED TO HOSPITAL  Risk of Unplanned Readmission Score: 30 99         Current admission status: Inpatient       Preferred Pharmacy:   [address] [place] [address]  Phone: [phone] Fax: [phone]    Primary Care Provider: Krista Zepeda MD    Primary Insurance: MEDICARE  Secondary Insurance: BLUE CROSS    ASSESSMENT:  Angelito Mckeon [employer]     Tony dutta [address] Representative - Significant Other   Primary Phone: [phone] (Mobile)                 Readmission Root Cause  30 Day Readmission: Yes  Patient was readmitted due to: Diabetic Ulcer of Left foot    Patient Information  Admitted from[de-identified] Home  Mental Status: Alert  During Assessment patient was accompanied by: Not accompanied during assessment  Assessment information provided by[de-identified] Patient  Primary Caregiver: Self  Support Systems: Spouse/significant other  Home entry access options  Select all that apply : Stairs  Number of steps to enter home : 7  Type of Current Residence: 3 story home  Living Arrangements: Lives w/ Spouse/significant other    Activities of Daily Living Prior to Admission  Functional Status: Independent  Completes ADLs independently?: Yes  Ambulates independently?: Yes  Does patient use assisted devices?: Yes  Assisted Devices (DME) used: Crutches  Does patient currently own DME?: Yes  What DME does the patient currently own?: Crutches  Does patient have a history of Outpatient Therapy (PT/OT)?: No  Does the patient have a history of Short-Term Rehab?: No  Does patient have a history of HHC?: Yes ([employer])  Does patient currently have Ousmane Johnson?: Yes ([facility])    Current Home Health Care  Type of Current Home Care Services: Nurse visit, Home PT  [address][de-identified] Other (please enter name in comment) ([facility])  [facility] Provider[de-identified] PCP    Patient Information Continued  Does patient have prescription coverage?: Yes  Does patient have a history of substance abuse?: No  Does patient have a history of Mental Health Diagnosis?: No      Means of Transportation  Means of Transport to Appts[de-identified] Drives Self        DISCHARGE DETAILS:    Discharge planning discussed with[de-identified] patient  Freedom of Choice: Yes  Comments - Freedom of Choice: patient had [employer] for Ousmane Johnson and would like to resume services  CM contacted family/caregiver?: No- see comments (ipta)  Were Treatment Team discharge recommendations reviewed with patient/caregiver?: Yes  Did patient/caregiver verbalize understanding of patient care needs?: Yes  Were patient/caregiver advised of the risks associated with not following Treatment Team discharge recommendations?: Yes    Contacts  Patient Contacts:  Debby Shen  Relationship to Patient[de-identified] Family  Contact Method:  In Person  Reason/Outcome: Discharge [address]         Is the patient interested in Ousmane Johnson at discharge?: Yes  [address] requested[de-identified] Nursing, Physical [address] Name[de-identified] Other ([address])  [address] Provider[de-identified] PCP  Home Health Services Needed[de-identified] Evaluate Functional Status and Safety, Gait/ADL Training, Strengthening/Theraputic Exercises to Improve Function, Wound/Ostomy Care  Homebound Criteria Met[de-identified] Requires the Assistance of Another Person for Safe Ambulation or to Leave the Home, Uses an Assist Device (i e  cane, walker, etc)  Supporting Clincal Findings[de-identified] Limited Endurance, Fatigues Easliy in [employer]    DME Referral Provided  Referral made for DME?: No    Other Referral/Resources/Interventions Provided:  Interventions: [facility]  Referral Comments: [facility]    Treatment Team Recommendation: Home with [facility]  Discharge Destination Plan[de-identified] Home with Marisela at Discharge : Family

## 2022-06-03 NOTE — ASSESSMENT & PLAN NOTE
Lab Results   Component Value Date    HGBA1C 6 6 (H) 04/26/2022     Recent Labs     06/02/22  1111 06/02/22  1606 06/03/22  0714 06/03/22  1105   POCGLU 177* 177* 150* 238*     · Continue sliding scale insulin

## 2022-06-03 NOTE — PROGRESS NOTES
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  Patient is status post left 5th ray resection, for surgical cure  Patient is clinically and systemically well  Last soft tissue culture from April had growth of Pseudomonas  Patient only need a brief postoperative antibiotic course for possible soft tissue infection  Will keep patient on IV Zosyn during hospitalization, with plan to transition p o  Levaquin at discharge, to minimize fluoroquinolone exposure  Operative culture with no growth thus far  Continue IV Zosyn for now  Follow-up on operative culture  If no other growth except for Pseudomonas, transition to Levaquin (500 mg daily) at discharge  Treat x 7 days postop  2  CKD stage 3  Creatinine stable  Antibiotic dosages adjusted accordingly  Monitor creatinine  3  DM, type 2  Elevated blood sugar is risk for wound nonhealing and infection above  Management per primary service  4  Morbid obesity  Discussed with patient in detail regarding the above plan  Antibiotics:  Zosyn  POD # 2     Subjective:  Patient is comfortable  Foot pain is mild and controlled  Temperature stays down  No chills  He is tolerating antibiotic well  No nausea, vomiting or diarrhea  Objective:  Vitals:  Temp:  [98 °F (36 7 °C)-99 6 °F (37 6 °C)] 99 °F (37 2 °C)  HR:  [87-99] 90  Resp:  [18] 18  BP: (109-131)/(69-83) 113/70  SpO2:  [89 %-95 %] 93 %  Temp (24hrs), Av 8 °F (37 1 °C), Min:98 °F (36 7 °C), Max:99 6 °F (37 6 °C)  Current: Temperature: 99 °F (37 2 °C)    Physical Exam:     General: Awake, alert, cooperative, no distress  Neck:  Supple  No mass  No lymphadenopathy  Lungs: Expansion symmetric, no rales, no wheezing, respirations unlabored     Heart:  Regular rate and rhythm, S1 and S2 normal, no murmur  Abdomen: Soft, nondistended, non-tender, bowel sounds active all four quadrants, no masses, no organomegaly  Extremities: Stable leg edema  Left foot with dressing in place  Dressing is dry  No erythema/warmth beyond dressing  Mild tenderness laterally  Skin:  No rash  Neuro: Moves all extremities  Invasive Devices  Report    Peripheral Intravenous Line  Duration           Peripheral IV 05/31/22 Left Antecubital 2 days    Peripheral IV 05/31/22 Right Forearm 2 days                Labs studies:   I have personally reviewed pertinent labs  Results from last 7 days   Lab Units 06/03/22  0511 06/02/22  0437 06/01/22  0451 05/31/22  1045   POTASSIUM mmol/L 4 8 5 0 4 4 5 2   CHLORIDE mmol/L 104 101 100 98*   CO2 mmol/L 23 25 24 24   BUN mg/dL 46* 44* 54* 49*   CREATININE mg/dL 1 94* 1 46* 1 99* 2 29*   EGFR ml/min/1 73sq m 36 51 35 30   CALCIUM mg/dL 8 3 9 2 9 2 9 3   AST U/L 10  --   --  16   ALT U/L 20  --   --  23   ALK PHOS U/L 60  --   --  71     Results from last 7 days   Lab Units 06/03/22  0511 06/02/22  0437 06/01/22  0451   WBC Thousand/uL 5 70 5 74 4 96   HEMOGLOBIN g/dL 10 9* 12 6 13 2   PLATELETS Thousands/uL 188 205 209     Results from last 7 days   Lab Units 06/01/22  1814 05/31/22  1054 05/31/22  1045   BLOOD CULTURE   --  No Growth at 48 hrs  No Growth at 48 hrs  GRAM STAIN RESULT  Rare Polys  No organisms seen  --   --        Imaging Studies:   I have personally reviewed pertinent imaging study reports and images in PACS  EKG, Pathology, and Other Studies:   I have personally reviewed pertinent reports

## 2022-06-03 NOTE — PLAN OF CARE
Problem: Potential for Falls  Goal: Patient will remain free of falls  Description: INTERVENTIONS:  - Educate patient/family on patient safety including physical limitations  - Instruct patient to call for assistance with activity   - Consult OT/PT to assist with strengthening/mobility   - Keep Call bell within reach  - Keep bed low and locked with side rails adjusted as appropriate  - Keep care items and personal belongings within reach  - Initiate and maintain comfort rounds  - Make Fall Risk Sign visible to staff  - Apply yellow socks and bracelet for high fall risk patients  - Consider moving patient to room near nurses station  Outcome: Progressing     Problem: PAIN - ADULT  Goal: Verbalizes/displays adequate comfort level or baseline comfort level  Description: Interventions:  - Encourage patient to monitor pain and request assistance  - Assess pain using appropriate pain scale  - Administer analgesics based on type and severity of pain and evaluate response  - Implement non-pharmacological measures as appropriate and evaluate response  - Consider cultural and social influences on pain and pain management  - Notify physician/advanced practitioner if interventions unsuccessful or patient reports new pain  Outcome: Progressing     Problem: INFECTION - ADULT  Goal: Absence or prevention of progression during hospitalization  Description: INTERVENTIONS:  - Assess and monitor for signs and symptoms of infection  - Monitor lab/diagnostic results  - Monitor all insertion sites, i e  indwelling lines, tubes, and drains  - Monitor endotracheal if appropriate and nasal secretions for changes in amount and color  - [place] appropriate cooling/warming therapies per order  - Administer medications as ordered  - Instruct and encourage patient and family to use good hand hygiene technique  - Identify and instruct in appropriate isolation precautions for identified infection/condition  Outcome: Progressing  Goal: Absence of fever/infection during neutropenic period  Description: INTERVENTIONS:  - Monitor WBC    Outcome: Progressing     Problem: SAFETY ADULT  Goal: Patient will remain free of falls  Description: INTERVENTIONS:  - Educate patient/family on patient safety including physical limitations  - Instruct patient to call for assistance with activity   - Consult OT/PT to assist with strengthening/mobility   - Keep Call bell within reach  - Keep bed low and locked with side rails adjusted as appropriate  - Keep care items and personal belongings within reach  - Initiate and maintain comfort rounds  - Make Fall Risk Sign visible to staff  - Apply yellow socks and bracelet for high fall risk patients  - Consider moving patient to room near nurses station  Outcome: Progressing  Goal: Maintain or return to baseline ADL function  Description: INTERVENTIONS:  -  Assess patient's ability to carry out ADLs; assess patient's baseline for ADL function and identify physical deficits which impact ability to perform ADLs (bathing, care of mouth/teeth, toileting, grooming, dressing, etc )  - Assess/evaluate cause of self-care deficits   - Assess range of motion  - Assess patient's mobility; develop plan if impaired  - Assess patient's need for assistive devices and provide as appropriate  - Encourage maximum independence but intervene and supervise when necessary  - Involve family in performance of ADLs  - Assess for home care needs following discharge   - Consider OT consult to assist with ADL evaluation and planning for discharge  - Provide patient education as appropriate  Outcome: Progressing  Goal: Maintains/Returns to pre admission functional level  Description: INTERVENTIONS:  - Perform BMAT or MOVE assessment daily    - Set and communicate daily mobility goal to care team and patient/family/caregiver     - Collaborate with rehabilitation services on mobility goals if consulted  - Out of bed for toileting  - Record patient progress and toleration of activity level   Outcome: Progressing     Problem: DISCHARGE PLANNING  Goal: Discharge to home or other facility with appropriate resources  Description: INTERVENTIONS:  - Identify barriers to discharge w/patient and caregiver  - Arrange for needed discharge resources and transportation as appropriate  - Identify discharge learning needs (meds, wound care, etc )  - Arrange for interpretive services to assist at discharge as needed  - Refer to Case Management Department for coordinating discharge planning if the patient needs post-hospital services based on physician/advanced practitioner order or complex needs related to functional status, cognitive ability, or social support system  Outcome: Progressing     Problem: Knowledge Deficit  Goal: Patient/family/caregiver demonstrates understanding of disease process, treatment plan, medications, and discharge instructions  Description: Complete learning assessment and assess knowledge base    Interventions:  - Provide teaching at level of understanding  - Provide teaching via preferred learning methods  Outcome: Progressing     Problem: METABOLIC, FLUID AND ELECTROLYTES - ADULT  Goal: Fluid balance maintained  Description: INTERVENTIONS:  - Monitor labs   - Monitor I/O and WT  - Instruct patient on fluid and nutrition as appropriate  - Assess for signs & symptoms of volume excess or deficit  Outcome: Progressing  Goal: Glucose maintained within target range  Description: INTERVENTIONS:  - Monitor Blood Glucose as ordered  - Assess for signs and symptoms of hyperglycemia and hypoglycemia  - Administer ordered medications to maintain glucose within target range  - Assess nutritional intake and initiate nutrition service referral as needed  Outcome: Progressing     Problem: HEMATOLOGIC - ADULT  Goal: Maintains hematologic stability  Description: INTERVENTIONS  - Assess for signs and symptoms of bleeding or hemorrhage  - Monitor labs  - Administer supportive blood products/factors as ordered and appropriate  Outcome: Progressing     Problem: MOBILITY - ADULT  Goal: Maintain or return to baseline ADL function  Description: INTERVENTIONS:  -  Assess patient's ability to carry out ADLs; assess patient's baseline for ADL function and identify physical deficits which impact ability to perform ADLs (bathing, care of mouth/teeth, toileting, grooming, dressing, etc )  - Assess/evaluate cause of self-care deficits   - Assess range of motion  - Assess patient's mobility; develop plan if impaired  - Assess patient's need for assistive devices and provide as appropriate  - Encourage maximum independence but intervene and supervise when necessary  - Involve family in performance of ADLs  - Assess for home care needs following discharge   - Consider OT consult to assist with ADL evaluation and planning for discharge  - Provide patient education as appropriate  Outcome: Progressing  Goal: Maintains/Returns to pre admission functional level  Description: INTERVENTIONS:  - Perform BMAT or MOVE assessment daily    - Set and communicate daily mobility goal to care team and patient/family/caregiver     - Collaborate with rehabilitation services on mobility goals if consulted  - Out of bed for toileting  - Record patient progress and toleration of activity level   Outcome: Progressing

## 2022-06-04 PROBLEM — G25.81 RLS (RESTLESS LEGS SYNDROME): Status: ACTIVE | Noted: 2022-06-04

## 2022-06-04 LAB
ALBUMIN SERPL BCP-MCNC: 2.8 G/DL (ref 3.5–5)
ALP SERPL-CCNC: 66 U/L (ref 46–116)
ALT SERPL W P-5'-P-CCNC: 22 U/L (ref 12–78)
ANION GAP SERPL CALCULATED.3IONS-SCNC: 11 MMOL/L (ref 4–13)
AST SERPL W P-5'-P-CCNC: 12 U/L (ref 5–45)
BACTERIA SPEC ANAEROBE CULT: NORMAL
BACTERIA TISS AEROBE CULT: ABNORMAL
BACTERIA TISS AEROBE CULT: ABNORMAL
BILIRUB SERPL-MCNC: 0.41 MG/DL (ref 0.2–1)
BUN SERPL-MCNC: 49 MG/DL (ref 5–25)
CALCIUM ALBUM COR SERPL-MCNC: 9.5 MG/DL (ref 8.3–10.1)
CALCIUM SERPL-MCNC: 8.5 MG/DL (ref 8.3–10.1)
CHLORIDE SERPL-SCNC: 102 MMOL/L (ref 100–108)
CO2 SERPL-SCNC: 23 MMOL/L (ref 21–32)
CREAT SERPL-MCNC: 2.05 MG/DL (ref 0.6–1.3)
ERYTHROCYTE [DISTWIDTH] IN BLOOD BY AUTOMATED COUNT: 12.5 % (ref 11.6–15.1)
GFR SERPL CREATININE-BSD FRML MDRD: 34 ML/MIN/1.73SQ M
GLUCOSE SERPL-MCNC: 149 MG/DL (ref 65–140)
GLUCOSE SERPL-MCNC: 170 MG/DL (ref 65–140)
GLUCOSE SERPL-MCNC: 177 MG/DL (ref 65–140)
GLUCOSE SERPL-MCNC: 186 MG/DL (ref 65–140)
GLUCOSE SERPL-MCNC: 191 MG/DL (ref 65–140)
GRAM STN SPEC: ABNORMAL
GRAM STN SPEC: ABNORMAL
HCT VFR BLD AUTO: 32.7 % (ref 36.5–49.3)
HGB BLD-MCNC: 10.6 G/DL (ref 12–17)
MCH RBC QN AUTO: 30.1 PG (ref 26.8–34.3)
MCHC RBC AUTO-ENTMCNC: 32.4 G/DL (ref 31.4–37.4)
MCV RBC AUTO: 93 FL (ref 82–98)
PLATELET # BLD AUTO: 210 THOUSANDS/UL (ref 149–390)
PMV BLD AUTO: 10.1 FL (ref 8.9–12.7)
POTASSIUM SERPL-SCNC: 4.7 MMOL/L (ref 3.5–5.3)
PROT SERPL-MCNC: 7.1 G/DL (ref 6.4–8.2)
RBC # BLD AUTO: 3.52 MILLION/UL (ref 3.88–5.62)
SODIUM SERPL-SCNC: 136 MMOL/L (ref 136–145)
WBC # BLD AUTO: 6.07 THOUSAND/UL (ref 4.31–10.16)

## 2022-06-04 PROCEDURE — 85027 COMPLETE CBC AUTOMATED: CPT | Performed by: INTERNAL MEDICINE

## 2022-06-04 PROCEDURE — 80053 COMPREHEN METABOLIC PANEL: CPT | Performed by: INTERNAL MEDICINE

## 2022-06-04 PROCEDURE — 82948 REAGENT STRIP/BLOOD GLUCOSE: CPT

## 2022-06-04 PROCEDURE — 99232 SBSQ HOSP IP/OBS MODERATE 35: CPT | Performed by: INTERNAL MEDICINE

## 2022-06-04 RX ORDER — TRAMADOL HYDROCHLORIDE 50 MG/1
50 TABLET ORAL
Status: DISCONTINUED | OUTPATIENT
Start: 2022-06-04 | End: 2022-06-05

## 2022-06-04 RX ORDER — SODIUM CHLORIDE 9 MG/ML
125 INJECTION, SOLUTION INTRAVENOUS CONTINUOUS
Status: DISPENSED | OUTPATIENT
Start: 2022-06-04 | End: 2022-06-05

## 2022-06-04 RX ADMIN — HYDROMORPHONE HYDROCHLORIDE 0.5 MG: 1 INJECTION, SOLUTION INTRAMUSCULAR; INTRAVENOUS; SUBCUTANEOUS at 08:15

## 2022-06-04 RX ADMIN — OXYCODONE HYDROCHLORIDE 10 MG: 10 TABLET ORAL at 05:12

## 2022-06-04 RX ADMIN — CARVEDILOL 25 MG: 25 TABLET, FILM COATED ORAL at 16:14

## 2022-06-04 RX ADMIN — HYDROMORPHONE HYDROCHLORIDE 0.5 MG: 1 INJECTION, SOLUTION INTRAMUSCULAR; INTRAVENOUS; SUBCUTANEOUS at 16:14

## 2022-06-04 RX ADMIN — OXYCODONE HYDROCHLORIDE 10 MG: 10 TABLET ORAL at 20:09

## 2022-06-04 RX ADMIN — TRAMADOL HYDROCHLORIDE 50 MG: 50 TABLET, COATED ORAL at 21:26

## 2022-06-04 RX ADMIN — OXYCODONE HYDROCHLORIDE AND ACETAMINOPHEN 1000 MG: 500 TABLET ORAL at 20:09

## 2022-06-04 RX ADMIN — PIPERACILLIN AND TAZOBACTAM 3.38 G: 3; .375 INJECTION, POWDER, LYOPHILIZED, FOR SOLUTION INTRAVENOUS at 20:15

## 2022-06-04 RX ADMIN — ZINC SULFATE 220 MG (50 MG) CAPSULE 220 MG: CAPSULE at 08:16

## 2022-06-04 RX ADMIN — SODIUM CHLORIDE 125 ML/HR: 0.9 INJECTION, SOLUTION INTRAVENOUS at 14:40

## 2022-06-04 RX ADMIN — LOTEPREDNOL ETABONATE 1 DROP: 10 SUSPENSION TOPICAL at 08:17

## 2022-06-04 RX ADMIN — PIPERACILLIN AND TAZOBACTAM 3.38 G: 3; .375 INJECTION, POWDER, LYOPHILIZED, FOR SOLUTION INTRAVENOUS at 14:39

## 2022-06-04 RX ADMIN — CARVEDILOL 25 MG: 25 TABLET, FILM COATED ORAL at 08:14

## 2022-06-04 RX ADMIN — Medication 400 UNITS: at 08:16

## 2022-06-04 RX ADMIN — PIPERACILLIN AND TAZOBACTAM 3.38 G: 3; .375 INJECTION, POWDER, LYOPHILIZED, FOR SOLUTION INTRAVENOUS at 08:15

## 2022-06-04 RX ADMIN — LOTEPREDNOL ETABONATE 1 DROP: 10 SUSPENSION TOPICAL at 20:12

## 2022-06-04 RX ADMIN — PIPERACILLIN AND TAZOBACTAM 3.38 G: 3; .375 INJECTION, POWDER, LYOPHILIZED, FOR SOLUTION INTRAVENOUS at 02:07

## 2022-06-04 RX ADMIN — OXYCODONE HYDROCHLORIDE AND ACETAMINOPHEN 1000 MG: 500 TABLET ORAL at 08:12

## 2022-06-04 NOTE — ASSESSMENT & PLAN NOTE
· Patient reports no improvement with trial of gabapentin or ropinirole in the past  · Reports tramadol usually works  Will schedule at bedtime

## 2022-06-04 NOTE — ASSESSMENT & PLAN NOTE
68-year-old gentleman with diabetes mellitus presents with recurrent osteomyelitis of left foot  · Recently hospitalized and discharged with levofloxacin  · This admission underwent fifth ray resection  · Remains on zosyn per ID with plan to transition to oral fluoroquinolone at time of discharge  · Awaiting clearance from podiatry for discharge recommendations and timing

## 2022-06-04 NOTE — PROGRESS NOTES
[address] [place] [address]  Progress Note [place] 1965, 59 y o  male MRN: [id number]  Unit/Bed#: E5 -01 Encounter: [id number]  Primary Care Provider: Liliana Robert MD   Date and time admitted to hospital: 5/31/2022  9:47 AM    * Diabetic ulcer of left foot [facility])  Assessment & Plan  58-year-old gentleman with diabetes mellitus presents with recurrent osteomyelitis of left foot  · Recently hospitalized and discharged with levofloxacin  · This admission underwent fifth ray resection  · Remains on zosyn per ID with plan to transition to oral fluoroquinolone at time of discharge  · Awaiting clearance from podiatry for discharge recommendations and timing    Acute kidney injury superimposed on chronic kidney disease ([facility] [address] )  Assessment & Plan  · VIVIANA on CKD 3 secondary to acute infection  · Did improve with IV fluids but worsening again  · Discontinued lisinopril/HCT  Increase IV fluids  Results from last 7 days   Lab Units 06/04/22  0510 06/03/22  0511 06/02/22  0437 06/01/22  0451 05/31/22  1045   BUN mg/dL 49* 46* 44* 54* 49*   CREATININE mg/dL 2 05* 1 94* 1 46* 1 99* 2 29*   EGFR ml/min/1 73sq m 34 36 51 35 30       RLS (restless legs syndrome)  Assessment & Plan  · Patient reports no improvement with trial of gabapentin or ropinirole in the past  · Reports tramadol usually works  Will schedule at bedtime  H/O eye surgery  Assessment & Plan  · Follows with Dr Kofi Otto ([phone])  · Was supposed to have sutures removed but patient has not followed up yet  · Continue eyedrops but advised need for close follow-up    Obesity  Assessment & Plan  · Body mass index is 35 88 kg/m²      Diabetes mellitus [facility])  Assessment & Plan  Lab Results   Component Value Date    HGBA1C 6 6 (H) 04/26/2022     Recent Labs     06/03/22  0714 06/03/22  1105 06/03/22  1606 06/04/22  0712   POCGLU 150* 238* 156* 177*     · Continue sliding scale insulin    Essential hypertension  Assessment & Plan  · Continue carvedilol but discontinued lisinopril/HCT given worsening renal function      VTE Pharmacologic Prophylaxis: VTE Score: 3 Moderate Risk (Score 3-4) - Pharmacological DVT Prophylaxis Ordered: Heparin  Patient Centered Rounds: I have performed bedside rounds with nursing staff today  Discussions with Specialists or Other Care Team Provider:  Case management    Education and Discussions with Family / Patient:     Time Spent for Care: 20 mins  More than 50% of total time spent on counseling and coordination of care as described above  Current Length of Stay: 4 day(s)  Current Patient Status: Inpatient   Certification Statement: The patient will continue to require additional inpatient hospital stay due to kidney injury and awaiting podiatric clearance  Discharge Plan / Estimated Discharge Date: 24-48 hours when cleared by podiatry    Code Status: Level 3 - DNAR and DNI      Subjective:   Patient seen and examined  Complains of restless leg  No urinary complaints  Objective:   Vitals: Blood pressure 122/78, pulse 85, temperature 98 2 °F (36 8 °C), resp  rate 18, height 6' (1 829 m), weight 120 kg (264 lb 8 8 oz), SpO2 95 %  Physical Exam  Vitals reviewed  Constitutional:       General: He is not in acute distress  HENT:      Head: Atraumatic  Cardiovascular:      Rate and Rhythm: Regular rhythm  Heart sounds: Normal heart sounds  Pulmonary:      Effort: Pulmonary effort is normal       Breath sounds: Decreased breath sounds present  No wheezing  Abdominal:      General: Bowel sounds are normal       Palpations: Abdomen is soft  Tenderness: There is no abdominal tenderness  There is no rebound  Musculoskeletal:         General: Deformity (Left foot wrapped) present  No swelling or tenderness  Skin:     General: Skin is warm and dry  Neurological:      General: No focal deficit present  Mental Status: He is alert and oriented to person, place, and time  Cranial Nerves: No cranial nerve deficit  Psychiatric:         Mood and Affect: Mood normal        Additional Data:   Labs:  Results from last 7 days   Lab Units 06/04/22  0510 06/03/22  0511 06/02/22  0437 06/01/22  0451 05/31/22  1045   WBC Thousand/uL 6 07 5 70 5 74 4 96 7 32   HEMOGLOBIN g/dL 10 6* 10 9* 12 6 13 2 12 7   HEMATOCRIT % 32 7* 33 6* 38 5 39 9 38 6   MCV fL 93 95 94 94 91   PLATELETS Thousands/uL 210 188 205 209 246   INR   --   --   --   --  1 14     Results from last 7 days   Lab Units 06/04/22  0510 06/03/22  0511 06/02/22  0437 06/01/22  0451 05/31/22  1045   SODIUM mmol/L 136 137 135* 135* 134*   POTASSIUM mmol/L 4 7 4 8 5 0 4 4 5 2   CHLORIDE mmol/L 102 104 101 100 98*   CO2 mmol/L 23 23 25 24 24   ANION GAP mmol/L 11 10 9 11 12   BUN mg/dL 49* 46* 44* 54* 49*   CREATININE mg/dL 2 05* 1 94* 1 46* 1 99* 2 29*   CALCIUM mg/dL 8 5 8 3 9 2 9 2 9 3   ALBUMIN g/dL 2 8* 2 8*  --   --  3 4*   TOTAL BILIRUBIN mg/dL 0 41 0 39  --   --  0 29   ALK PHOS U/L 66 60  --   --  71   ALT U/L 22 20  --   --  23   AST U/L 12 10  --   --  16   EGFR ml/min/1 73sq m 34 36 51 35 30   GLUCOSE RANDOM mg/dL 170* 149* 126 144* 196*                      Results from last 7 days   Lab Units 05/31/22  1045   LACTIC ACID mmol/L 1 0   PROCALCITONIN ng/ml 0 14     Results from last 7 days   Lab Units 06/04/22  0712 06/03/22  1606 06/03/22  1105 06/03/22  0714 06/02/22  1606 06/02/22  1111 06/02/22  0708 06/01/22  1915 06/01/22  1557 06/01/22  1209 06/01/22  0751 05/31/22  2123   POC GLUCOSE mg/dl 177* 156* 238* 150* 177* 177* 155* 159* 162* 154* 146* 168*             * I Have Reviewed All Lab Data Listed Above  Cultures:   Results from last 7 days   Lab Units 06/01/22  1814 05/31/22  1054 05/31/22  1045   BLOOD CULTURE   --  No Growth at 72 hrs  No Growth at 72 hrs     GRAM STAIN RESULT  Rare Polys  No organisms seen  --   --                  Lines/Drains:  Invasive Devices  Report    Peripheral Intravenous Line  Duration Peripheral IV 06/04/22 Dorsal (posterior); Right Hand <1 day              Telemetry:      Imaging:  Imaging Reports Reviewed Today Include:   XR foot left 3+ views    Result Date: 6/2/2022  Impression: Interval resection of 5th ray to the level of the metatarsal base  Workstation performed: [id number]     XR foot 3+ views LEFT    Result Date: 5/31/2022  Impression: Bony destruction in the head of the 5th metatarsal with a pathologic fracture in keeping with osteomyelitis which appears worse compared to the most recent study  There is questionable erosion of the proximal, lateral aspect of the proximal phalanx of the 5th toe  This raises concern for the presence of septic arthritis  Workstation performed: [id number]     MRI foot/forefoot toes left wo contrast    Result Date: 6/1/2022  Impression: Acute osteomyelitis with associated fracture involving the fifth metatarsal head and shaft  Findings suspicious for early acute osteomyelitis involving the base of the fifth proximal phalanx  Soft tissue ulceration along the lateral aspect of the fifth metatarsal head with a sinus tract extending into the fifth metatarsal head  The study was marked in [facility] for immediate notification   Workstation performed: [id number]       Scheduled Meds:  Current Facility-Administered Medications   Medication Dose Route Frequency Provider Last Rate    acetaminophen  650 mg Oral Q6H PRN Muriel Dorantes DPM      ALPRAZolam  1 mg Oral Once in imaging Muriel Dorantes DPM      vitamin C  1,000 mg Oral BID Muriel Dorantes DPM      carvedilol  25 mg Oral BID With Meals Muriel Dorantes DPM      cyclobenzaprine  5 mg Oral BID PRN Quang Mckee PA-C      glycerin-hypromellose-  1 drop Both Eyes Q4H PRN CARLOS MANUEL GriffinM      heparin (porcine)  5,000 Units Subcutaneous [facility] Nikky Griffin      HYDROmorphone  0 5 mg Intravenous Q4H PRN Quang Mckee PA-C      insulin lispro  2-12 Units Subcutaneous TID [facility] Muriel Dorantes DPM      Loteprednol Etabonate  1 drop Ophthalmic BID Perico Nugent DO      oxyCODONE  10 mg Oral Q4H PRN Perico Nugent DO      oxyCODONE  5 mg Oral Q4H PRN Perico Nugent DO      piperacillin-tazobactam  3 375 g Intravenous Q6H Blondie Deidre, DPM 3 375 g (06/04/22 0815)    sodium chloride  75 mL/hr Intravenous Continuous Jackie Piger, PA-C 75 mL/hr (06/03/22 0253)    vitamin E (tocopherol)  400 Units Oral Daily Blondie Deidre, DPM      zinc sulfate  220 mg Oral Daily Blondie Deidre, DPM         Today, Patient Was Seen By: Perico Nugent DO    ** Please Note: Dictation voice to text software may have been used in the creation of this document   **

## 2022-06-04 NOTE — ASSESSMENT & PLAN NOTE
Lab Results   Component Value Date    HGBA1C 6 6 (H) 04/26/2022     Recent Labs     06/03/22  0714 06/03/22  1105 06/03/22  1606 06/04/22  0712   POCGLU 150* 238* 156* 177*     · Continue sliding scale insulin

## 2022-06-04 NOTE — ASSESSMENT & PLAN NOTE
· Follows with Dr Eloise Morrow ([phone])  · Was supposed to have sutures removed but patient has not followed up yet    · Continue eyedrops but advised need for close follow-up

## 2022-06-04 NOTE — PLAN OF CARE
Problem: Potential for Falls  Goal: Patient will remain free of falls  Description: INTERVENTIONS:  - Educate patient/family on patient safety including physical limitations  - Instruct patient to call for assistance with activity   - Consult OT/PT to assist with strengthening/mobility   - Keep Call bell within reach  - Keep bed low and locked with side rails adjusted as appropriate  - Keep care items and personal belongings within reach  - Initiate and maintain comfort rounds  - Make Fall Risk Sign visible to staff  - Apply yellow socks and bracelet for high fall risk patients  - Consider moving patient to room near nurses station  Outcome: Progressing     Problem: PAIN - ADULT  Goal: Verbalizes/displays adequate comfort level or baseline comfort level  Description: Interventions:  - Encourage patient to monitor pain and request assistance  - Assess pain using appropriate pain scale  - Administer analgesics based on type and severity of pain and evaluate response  - Implement non-pharmacological measures as appropriate and evaluate response  - Consider cultural and social influences on pain and pain management  - Notify physician/advanced practitioner if interventions unsuccessful or patient reports new pain  Outcome: Progressing     Problem: INFECTION - ADULT  Goal: Absence or prevention of progression during hospitalization  Description: INTERVENTIONS:  - Assess and monitor for signs and symptoms of infection  - Monitor lab/diagnostic results  - Monitor all insertion sites, i e  indwelling lines, tubes, and drains  - Monitor endotracheal if appropriate and nasal secretions for changes in amount and color  - [place] appropriate cooling/warming therapies per order  - Administer medications as ordered  - Instruct and encourage patient and family to use good hand hygiene technique  - Identify and instruct in appropriate isolation precautions for identified infection/condition  Outcome: Progressing  Goal: Absence of fever/infection during neutropenic period  Description: INTERVENTIONS:  - Monitor WBC    Outcome: Progressing     Problem: SAFETY ADULT  Goal: Patient will remain free of falls  Description: INTERVENTIONS:  - Educate patient/family on patient safety including physical limitations  - Instruct patient to call for assistance with activity   - Consult OT/PT to assist with strengthening/mobility   - Keep Call bell within reach  - Keep bed low and locked with side rails adjusted as appropriate  - Keep care items and personal belongings within reach  - Initiate and maintain comfort rounds  - Make Fall Risk Sign visible to staff  - Apply yellow socks and bracelet for high fall risk patients  - Consider moving patient to room near nurses station  Outcome: Progressing  Goal: Maintain or return to baseline ADL function  Description: INTERVENTIONS:  -  Assess patient's ability to carry out ADLs; assess patient's baseline for ADL function and identify physical deficits which impact ability to perform ADLs (bathing, care of mouth/teeth, toileting, grooming, dressing, etc )  - Assess/evaluate cause of self-care deficits   - Assess range of motion  - Assess patient's mobility; develop plan if impaired  - Assess patient's need for assistive devices and provide as appropriate  - Encourage maximum independence but intervene and supervise when necessary  - Involve family in performance of ADLs  - Assess for home care needs following discharge   - Consider OT consult to assist with ADL evaluation and planning for discharge  - Provide patient education as appropriate  Outcome: Progressing  Goal: Maintains/Returns to pre admission functional level  Description: INTERVENTIONS:  - Perform BMAT or MOVE assessment daily    - Set and communicate daily mobility goal to care team and patient/family/caregiver     - Collaborate with rehabilitation services on mobility goals if consulted  - Out of bed for toileting  - Record patient progress and toleration of activity level   Outcome: Progressing     Problem: DISCHARGE PLANNING  Goal: Discharge to home or other facility with appropriate resources  Description: INTERVENTIONS:  - Identify barriers to discharge w/patient and caregiver  - Arrange for needed discharge resources and transportation as appropriate  - Identify discharge learning needs (meds, wound care, etc )  - Arrange for interpretive services to assist at discharge as needed  - Refer to Case Management Department for coordinating discharge planning if the patient needs post-hospital services based on physician/advanced practitioner order or complex needs related to functional status, cognitive ability, or social support system  Outcome: Progressing     Problem: Knowledge Deficit  Goal: Patient/family/caregiver demonstrates understanding of disease process, treatment plan, medications, and discharge instructions  Description: Complete learning assessment and assess knowledge base    Interventions:  - Provide teaching at level of understanding  - Provide teaching via preferred learning methods  Outcome: Progressing     Problem: METABOLIC, FLUID AND ELECTROLYTES - ADULT  Goal: Fluid balance maintained  Description: INTERVENTIONS:  - Monitor labs   - Monitor I/O and WT  - Instruct patient on fluid and nutrition as appropriate  - Assess for signs & symptoms of volume excess or deficit  Outcome: Progressing  Goal: Glucose maintained within target range  Description: INTERVENTIONS:  - Monitor Blood Glucose as ordered  - Assess for signs and symptoms of hyperglycemia and hypoglycemia  - Administer ordered medications to maintain glucose within target range  - Assess nutritional intake and initiate nutrition service referral as needed  Outcome: Progressing     Problem: HEMATOLOGIC - ADULT  Goal: Maintains hematologic stability  Description: INTERVENTIONS  - Assess for signs and symptoms of bleeding or hemorrhage  - Monitor labs  - Administer supportive blood products/factors as ordered and appropriate  Outcome: Progressing     Problem: MOBILITY - ADULT  Goal: Maintain or return to baseline ADL function  Description: INTERVENTIONS:  -  Assess patient's ability to carry out ADLs; assess patient's baseline for ADL function and identify physical deficits which impact ability to perform ADLs (bathing, care of mouth/teeth, toileting, grooming, dressing, etc )  - Assess/evaluate cause of self-care deficits   - Assess range of motion  - Assess patient's mobility; develop plan if impaired  - Assess patient's need for assistive devices and provide as appropriate  - Encourage maximum independence but intervene and supervise when necessary  - Involve family in performance of ADLs  - Assess for home care needs following discharge   - Consider OT consult to assist with ADL evaluation and planning for discharge  - Provide patient education as appropriate  Outcome: Progressing  Goal: Maintains/Returns to pre admission functional level  Description: INTERVENTIONS:  - Perform BMAT or MOVE assessment daily    - Set and communicate daily mobility goal to care team and patient/family/caregiver     - Collaborate with rehabilitation services on mobility goals if consulted  - Out of bed for toileting  - Record patient progress and toleration of activity level   Outcome: Progressing

## 2022-06-04 NOTE — ASSESSMENT & PLAN NOTE
· VIVIANA on CKD 3 secondary to acute infection  · Did improve with IV fluids but worsening again  · Discontinued lisinopril/HCT  Increase IV fluids      Results from last 7 days   Lab Units 06/04/22  0510 06/03/22  0511 06/02/22  0437 06/01/22  0451 05/31/22  1045   BUN mg/dL 49* 46* 44* 54* 49*   CREATININE mg/dL 2 05* 1 94* 1 46* 1 99* 2 29*   EGFR ml/min/1 73sq m 34 36 51 35 30

## 2022-06-04 NOTE — PROGRESS NOTES
Podiatry - Progress Note  Patient: Rell Mathew 59 y o  male   MRN: [id number]  PCP: Liliana Robert MD  Unit/Bed#: E5 MS 24032 Encounter: [id number]  Date Of Visit: 22    ASSESSMENT:    Rell Mathew is a 59 y o  male with:    1  Diabetic ulceration left lateral 5th MTPJ with underlying osteomyelitis - [place] 3  1  S/p R partial 5th ray amputation (DOS 22)  2  Dorsal 2nd digit eschar, left foot  3  T2DM  4  VIVIANA on CKD2  5  Lumbar radiculopathy    PLAN:    · Incision site appears well coapted with skin edges well approximated  All sutures in place and intact  Surrounding tissue remains erythematous, edematous, warm to touch  · Patient has remained on IV antibiotics until time of discharge and will then transition to Levaquin to complete 7 days of postoperative antibiotics  Appreciate Infectious Disease recommendations  · We will serial exams to monitor appearance of the nikki-incision skin for signs of improvement prior to discharge  If the patient's left foot does not show signs of improvement, he may require another wound debridement/washout with application of wound VAC  · Elevation and offloading on green foam wedges or pillows when non-ambulatory  · Rest of care per primary team      Weightbearing status:  Strict nonweightbearing to the left lower extremity    SUBJECTIVE:     The patient was seen, evaluated, and assessed at bedside today  The patient was awake, alert, and in no acute distress  No acute events overnight  The patient reports moderate to severe pain to his left foot  Patient denies N/V/F/chills/SOB/CP  OBJECTIVE:     Vitals:   /78   Pulse 85   Temp 98 2 °F (36 8 °C)   Resp 18   Ht 6' (1 829 m)   Wt 120 kg (264 lb 8 8 oz)   SpO2 95%   BMI 35 88 kg/m²     Temp (24hrs), Av 5 °F (36 9 °C), Min:98 2 °F (36 8 °C), Max:98 8 °F (37 1 °C)      Physical Exam:     General: Alert, cooperative and no distress  Lungs: Non labored breathing  Abdomen: Soft, non-tender    Lower extremity exam:  Cardiovascular status at baseline  Neurological status at baseline  Musculoskeletal status at baseline  No calf tenderness noted  Incision site appears well coapted with skin edges well approximated  All sutures in place and intact  Surrounding tissue remains erythematous, edematous, warm to touch  Clinical Images 06/04/22: Additional Data:     Labs:    Results from last 7 days   Lab Units 06/04/22  0510 06/03/22  0511 06/02/22  0437   WBC Thousand/uL 6 07   < > 5 74   HEMOGLOBIN g/dL 10 6*   < > 12 6   HEMATOCRIT % 32 7*   < > 38 5   PLATELETS Thousands/uL 210   < > 205   NEUTROS PCT %  --   --  70   LYMPHS PCT %  --   --  13*   MONOS PCT %  --   --  13*   EOS PCT %  --   --  3    < > = values in this interval not displayed  Results from last 7 days   Lab Units 06/04/22  0510   POTASSIUM mmol/L 4 7   CHLORIDE mmol/L 102   CO2 mmol/L 23   BUN mg/dL 49*   CREATININE mg/dL 2 05*   CALCIUM mg/dL 8 5   ALK PHOS U/L 66   ALT U/L 22   AST U/L 12     Results from last 7 days   Lab Units 05/31/22  1045   INR  1 14       * I Have Reviewed All Lab Data Listed Above  Recent Cultures (last 7 days):     Results from last 7 days   Lab Units 06/01/22  1814 05/31/22  1054 05/31/22  1045   BLOOD CULTURE   --  No Growth at 72 hrs  No Growth at 72 hrs  GRAM STAIN RESULT  Rare Polys  No organisms seen  --   --      Results from last 7 days   Lab Units 06/01/22  1814   ANAEROBIC CULTURE  No anaerobes isolated       Imaging: I have personally reviewed pertinent films in PACS  EKG, Pathology, and Other Studies: I have personally reviewed pertinent reports  ** Please Note: Portions of the record may have been created with voice recognition software  Occasional wrong word or "sound a like" substitutions may have occurred due to the inherent limitations of voice recognition software  Read the chart carefully and recognize, using context, where substitutions have occurred   **

## 2022-06-05 ENCOUNTER — ANESTHESIA EVENT (INPATIENT)
Dept: PERIOP | Facility: HOSPITAL | Age: 57
DRG: 617 | End: 2022-06-05
Payer: MEDICARE

## 2022-06-05 LAB
ALBUMIN SERPL BCP-MCNC: 2.8 G/DL (ref 3.5–5)
ALP SERPL-CCNC: 75 U/L (ref 46–116)
ALT SERPL W P-5'-P-CCNC: 19 U/L (ref 12–78)
ANION GAP SERPL CALCULATED.3IONS-SCNC: 11 MMOL/L (ref 4–13)
AST SERPL W P-5'-P-CCNC: 13 U/L (ref 5–45)
BACTERIA BLD CULT: NORMAL
BACTERIA BLD CULT: NORMAL
BILIRUB SERPL-MCNC: 0.46 MG/DL (ref 0.2–1)
BUN SERPL-MCNC: 42 MG/DL (ref 5–25)
CALCIUM ALBUM COR SERPL-MCNC: 9.7 MG/DL (ref 8.3–10.1)
CALCIUM SERPL-MCNC: 8.7 MG/DL (ref 8.3–10.1)
CHLORIDE SERPL-SCNC: 102 MMOL/L (ref 100–108)
CO2 SERPL-SCNC: 22 MMOL/L (ref 21–32)
CREAT SERPL-MCNC: 1.83 MG/DL (ref 0.6–1.3)
ERYTHROCYTE [DISTWIDTH] IN BLOOD BY AUTOMATED COUNT: 12.5 % (ref 11.6–15.1)
GFR SERPL CREATININE-BSD FRML MDRD: 39 ML/MIN/1.73SQ M
GLUCOSE SERPL-MCNC: 126 MG/DL (ref 65–140)
GLUCOSE SERPL-MCNC: 131 MG/DL (ref 65–140)
GLUCOSE SERPL-MCNC: 147 MG/DL (ref 65–140)
GLUCOSE SERPL-MCNC: 167 MG/DL (ref 65–140)
GLUCOSE SERPL-MCNC: 227 MG/DL (ref 65–140)
HCT VFR BLD AUTO: 33.3 % (ref 36.5–49.3)
HGB BLD-MCNC: 10.7 G/DL (ref 12–17)
MCH RBC QN AUTO: 29.9 PG (ref 26.8–34.3)
MCHC RBC AUTO-ENTMCNC: 32.1 G/DL (ref 31.4–37.4)
MCV RBC AUTO: 93 FL (ref 82–98)
PLATELET # BLD AUTO: 232 THOUSANDS/UL (ref 149–390)
PMV BLD AUTO: 9.7 FL (ref 8.9–12.7)
POTASSIUM SERPL-SCNC: 4.7 MMOL/L (ref 3.5–5.3)
PROT SERPL-MCNC: 7.4 G/DL (ref 6.4–8.2)
RBC # BLD AUTO: 3.58 MILLION/UL (ref 3.88–5.62)
SODIUM SERPL-SCNC: 135 MMOL/L (ref 136–145)
WBC # BLD AUTO: 6.25 THOUSAND/UL (ref 4.31–10.16)

## 2022-06-05 PROCEDURE — 99232 SBSQ HOSP IP/OBS MODERATE 35: CPT | Performed by: INTERNAL MEDICINE

## 2022-06-05 PROCEDURE — 85027 COMPLETE CBC AUTOMATED: CPT | Performed by: INTERNAL MEDICINE

## 2022-06-05 PROCEDURE — 80053 COMPREHEN METABOLIC PANEL: CPT | Performed by: INTERNAL MEDICINE

## 2022-06-05 PROCEDURE — 82948 REAGENT STRIP/BLOOD GLUCOSE: CPT

## 2022-06-05 RX ORDER — CHLORHEXIDINE GLUCONATE 0.12 MG/ML
15 RINSE ORAL ONCE
Status: COMPLETED | OUTPATIENT
Start: 2022-06-05 | End: 2022-06-06

## 2022-06-05 RX ORDER — TRAMADOL HYDROCHLORIDE 50 MG/1
50 TABLET ORAL 2 TIMES DAILY PRN
Status: DISCONTINUED | OUTPATIENT
Start: 2022-06-05 | End: 2022-06-09 | Stop reason: HOSPADM

## 2022-06-05 RX ORDER — SODIUM CHLORIDE, SODIUM LACTATE, POTASSIUM CHLORIDE, CALCIUM CHLORIDE 600; 310; 30; 20 MG/100ML; MG/100ML; MG/100ML; MG/100ML
100 INJECTION, SOLUTION INTRAVENOUS CONTINUOUS
Status: DISCONTINUED | OUTPATIENT
Start: 2022-06-06 | End: 2022-06-07

## 2022-06-05 RX ORDER — SODIUM CHLORIDE 9 MG/ML
50 INJECTION, SOLUTION INTRAVENOUS CONTINUOUS
Status: DISPENSED | OUTPATIENT
Start: 2022-06-05 | End: 2022-06-06

## 2022-06-05 RX ADMIN — CARVEDILOL 25 MG: 25 TABLET, FILM COATED ORAL at 15:24

## 2022-06-05 RX ADMIN — GLYCERIN, HYPROMELLOSE, POLYETHYLENE GLYCOL 1 DROP: .2; .2; 1 LIQUID OPHTHALMIC at 19:51

## 2022-06-05 RX ADMIN — OXYCODONE HYDROCHLORIDE 10 MG: 10 TABLET ORAL at 04:55

## 2022-06-05 RX ADMIN — CYCLOBENZAPRINE HYDROCHLORIDE 5 MG: 10 TABLET, FILM COATED ORAL at 15:19

## 2022-06-05 RX ADMIN — ZINC SULFATE 220 MG (50 MG) CAPSULE 220 MG: CAPSULE at 08:09

## 2022-06-05 RX ADMIN — LOTEPREDNOL ETABONATE 1 DROP: 10 SUSPENSION TOPICAL at 21:59

## 2022-06-05 RX ADMIN — PIPERACILLIN AND TAZOBACTAM 3.38 G: 3; .375 INJECTION, POWDER, LYOPHILIZED, FOR SOLUTION INTRAVENOUS at 19:57

## 2022-06-05 RX ADMIN — OXYCODONE HYDROCHLORIDE AND ACETAMINOPHEN 1000 MG: 500 TABLET ORAL at 08:10

## 2022-06-05 RX ADMIN — OXYCODONE HYDROCHLORIDE AND ACETAMINOPHEN 1000 MG: 500 TABLET ORAL at 21:59

## 2022-06-05 RX ADMIN — Medication 400 UNITS: at 08:09

## 2022-06-05 RX ADMIN — PIPERACILLIN AND TAZOBACTAM 3.38 G: 3; .375 INJECTION, POWDER, LYOPHILIZED, FOR SOLUTION INTRAVENOUS at 02:22

## 2022-06-05 RX ADMIN — LOTEPREDNOL ETABONATE 1 DROP: 10 SUSPENSION TOPICAL at 08:10

## 2022-06-05 RX ADMIN — HYDROMORPHONE HYDROCHLORIDE 0.5 MG: 1 INJECTION, SOLUTION INTRAMUSCULAR; INTRAVENOUS; SUBCUTANEOUS at 08:09

## 2022-06-05 RX ADMIN — HYDROMORPHONE HYDROCHLORIDE 0.5 MG: 1 INJECTION, SOLUTION INTRAMUSCULAR; INTRAVENOUS; SUBCUTANEOUS at 18:35

## 2022-06-05 RX ADMIN — PIPERACILLIN AND TAZOBACTAM 3.38 G: 3; .375 INJECTION, POWDER, LYOPHILIZED, FOR SOLUTION INTRAVENOUS at 08:10

## 2022-06-05 RX ADMIN — PIPERACILLIN AND TAZOBACTAM 3.38 G: 3; .375 INJECTION, POWDER, LYOPHILIZED, FOR SOLUTION INTRAVENOUS at 15:18

## 2022-06-05 RX ADMIN — HYDROMORPHONE HYDROCHLORIDE 0.5 MG: 1 INJECTION, SOLUTION INTRAMUSCULAR; INTRAVENOUS; SUBCUTANEOUS at 00:51

## 2022-06-05 RX ADMIN — OXYCODONE HYDROCHLORIDE 10 MG: 10 TABLET ORAL at 15:18

## 2022-06-05 NOTE — ASSESSMENT & PLAN NOTE
58-year-old gentleman with diabetes mellitus presents with recurrent osteomyelitis of left foot  · Recently hospitalized and discharged with levofloxacin  · This admission underwent fifth ray resection  · Remains on zosyn per ID with plan to transition to oral fluoroquinolone at time of discharge  · Tissue cultures with pseudomonas aeruginosa  · Podiatry and ID to see today

## 2022-06-05 NOTE — PROGRESS NOTES
[address] [place] [address]  Progress Note Xena Person 1965, 59 y o  male MRN: [id number]  Unit/Bed#: E5 -01 Encounter: [id number]  Primary Care Provider: Celina Diaz MD   Date and time admitted to hospital: 5/31/2022  9:47 AM    RLS (restless legs syndrome)  Assessment & Plan  · Patient reports no improvement with trial of gabapentin or ropinirole in the past  · Reports tramadol usually works  Started yesterday on tramadol at bedtime  · Reports today that it helped but still having issues and that he used to take tramadol more frequently in the past (up to 4x/day)  · Will switch from scheduled bedtime to BID PRN    Acute kidney injury superimposed on chronic kidney disease ([facility] [address] )  Assessment & Plan  · VIVIANA on CKD 3 secondary to acute infection  · Did improve with IV fluids, creatinin peaked at 2 05  · Discontinued lisinopril/HCT  Increased IV fluids  · Creatinine now decreased to 1 8, continue to monitor  · This morning informed by nurse that patient had 670ml on bladder scan and just had urine output of 850ml  · Asked nurse to check PVR, VIVIANA may also be related to urinary retention    Results from last 7 days   Lab Units 06/05/22  0501 06/04/22  0510 06/03/22  0511 06/02/22  0437 06/01/22  0451 05/31/22  1045   BUN mg/dL 42* 49* 46* 44* 54* 49*   CREATININE mg/dL 1 83* 2 05* 1 94* 1 46* 1 99* 2 29*   EGFR ml/min/1 73sq m 39 34 36 51 35 30       H/O eye surgery  Assessment & Plan  · Follows with Dr Jelani Castillo ([phone])  · Was supposed to have sutures removed but patient has not followed up yet  · Continue eyedrops but advised need for close follow-up    Stage 2 chronic kidney disease  Assessment & Plan  Lab Results   Component Value Date    EGFR 39 06/05/2022    EGFR 34 06/04/2022    EGFR 36 06/03/2022    CREATININE 1 83 (H) 06/05/2022    CREATININE 2 05 (H) 06/04/2022    CREATININE 1 94 (H) 06/03/2022       Obesity  Assessment & Plan  · Body mass index is 35 88 kg/m²      Diabetes mellitus [facility])  Assessment & Plan  Lab Results   Component Value Date    HGBA1C 6 6 (H) 2022     Recent Labs     22  1608 22  2110 22  0713 22  1115   POCGLU 191* 149* 126 147*     · Continue sliding scale insulin    Essential hypertension  Assessment & Plan  · Continue carvedilol but discontinued lisinopril/HCT given worsening renal function    * Diabetic ulcer of left foot ([address] )  Assessment & Plan  61-year-old gentleman with diabetes mellitus presents with recurrent osteomyelitis of left foot  · Recently hospitalized and discharged with levofloxacin  · This admission underwent fifth ray resection  · Remains on zosyn per ID with plan to transition to oral fluoroquinolone at time of discharge  · Tissue cultures with pseudomonas aeruginosa  · Podiatry and ID to see today      VTE Pharmacologic Prophylaxis:   Pharmacologic: Heparin  Mechanical VTE Prophylaxis in Place: Yes    Patient Centered Rounds: I have performed bedside rounds with nursing staff today  Discussions with Specialists or Other Care Team Provider: nursing    Education and Discussions with Family / Patient: patient    Time Spent for Care: 30 minutes  More than 50% of total time spent on counseling and coordination of care as described above  Current Length of Stay: 5 day(s)    Current Patient Status: Inpatient   Certification Statement: The patient will continue to require additional inpatient hospital stay due to further evaluation by podiatry and infectious disease  Patient reports no improvement in pain of the foot    Discharge Plan: pending clearance from podiatry and infectious disease    Code Status: Level 3 - DNAR and DNI      Subjective:   Patient seen and examined bedside  Reports having pain in his bilateral feet worse on the left   No fevers, chills or worsening numbness    Objective:     Vitals:   Temp (24hrs), Av 6 °F (37 °C), Min:97 8 °F (36 6 °C), Max:99 °F (37 2 °C)    Temp:  [97 8 °F (36 6 °C)-99 °F (37 2 °C)] 98 5 °F (36 9 °C)  HR:  [84-90] 84  Resp:  [18] 18  BP: (108-139)/(64-84) 108/72  SpO2:  [92 %-95 %] 92 %  Body mass index is 35 88 kg/m²  Input and Output Summary (last 24 hours): Intake/Output Summary (Last 24 hours) at 6/5/2022 1250  Last data filed at 6/5/2022 1005  Gross per 24 hour   Intake 1570 ml   Output 850 ml   Net 720 ml       Physical Exam:     Physical Exam  Constitutional:       General: He is not in acute distress  Appearance: He is not diaphoretic  HENT:      Head: Normocephalic and atraumatic  Nose: Nose normal       Mouth/Throat:      Pharynx: No oropharyngeal exudate  Eyes:      General: No scleral icterus  Conjunctiva/sclera: Conjunctivae normal       Pupils: Pupils are equal, round, and reactive to light  Neck:      Thyroid: No thyromegaly  Vascular: No JVD  Trachea: No tracheal deviation  Cardiovascular:      Rate and Rhythm: Normal rate and regular rhythm  Heart sounds: Normal heart sounds  No murmur heard  No friction rub  No gallop  Pulmonary:      Effort: Pulmonary effort is normal  No respiratory distress  Breath sounds: Normal breath sounds  No stridor  No wheezing or rales  Abdominal:      General: Bowel sounds are normal  There is no distension  Palpations: Abdomen is soft  Tenderness: There is no abdominal tenderness  There is no guarding or rebound  Musculoskeletal:         General: No deformity  Normal range of motion  Cervical back: Normal range of motion and neck supple  Lymphadenopathy:      Cervical: No cervical adenopathy  Skin:     General: Skin is warm  Findings: No erythema or rash  Comments: Left foot wrapped   Neurological:      Mental Status: He is alert and oriented to person, place, and time  Cranial Nerves: No cranial nerve deficit  Sensory: No sensory deficit           Additional Data:     Labs:    Results from last 7 days   Lab Units 06/05/22  5117 06/03/22  0511 06/02/22  0437   WBC Thousand/uL 6 25   < > 5 74   HEMOGLOBIN g/dL 10 7*   < > 12 6   HEMATOCRIT % 33 3*   < > 38 5   PLATELETS Thousands/uL 232   < > 205   NEUTROS PCT %  --   --  70   LYMPHS PCT %  --   --  13*   MONOS PCT %  --   --  13*   EOS PCT %  --   --  3    < > = values in this interval not displayed  Results from last 7 days   Lab Units 06/05/22  0501   SODIUM mmol/L 135*   POTASSIUM mmol/L 4 7   CHLORIDE mmol/L 102   CO2 mmol/L 22   BUN mg/dL 42*   CREATININE mg/dL 1 83*   ANION GAP mmol/L 11   CALCIUM mg/dL 8 7   ALBUMIN g/dL 2 8*   TOTAL BILIRUBIN mg/dL 0 46   ALK PHOS U/L 75   ALT U/L 19   AST U/L 13   GLUCOSE RANDOM mg/dL 131     Results from last 7 days   Lab Units 05/31/22  1045   INR  1 14     Results from last 7 days   Lab Units 06/05/22  1115 06/05/22  0713 06/04/22  2110 06/04/22  1608 06/04/22  1134 06/04/22  0712 06/03/22  1606 06/03/22  1105 06/03/22  0714 06/02/22  1606 06/02/22  1111 06/02/22  0708   POC GLUCOSE mg/dl 147* 126 149* 191* 186* 177* 156* 238* 150* 177* 177* 155*         Results from last 7 days   Lab Units 05/31/22  1045   LACTIC ACID mmol/L 1 0   PROCALCITONIN ng/ml 0 14           * I Have Reviewed All Lab Data Listed Above  * Additional Pertinent Lab Tests Reviewed: All Labs For Current Hospital Admission Reviewed    Imaging:    Imaging Reports Reviewed Today Include: xray of left foot  Imaging Personally Reviewed by Myself Includes:  Xray of left foot    Recent Cultures (last 7 days):     Results from last 7 days   Lab Units 06/01/22  1814 05/31/22  1054 05/31/22  1045   BLOOD CULTURE   --  No Growth After 4 Days  No Growth After 4 Days     GRAM STAIN RESULT  Rare Polys  No organisms seen  --   --        Last 24 Hours Medication List:   Current Facility-Administered Medications   Medication Dose Route Frequency Provider Last Rate    acetaminophen  650 mg Oral Q6H PRN Bernabe Christianson DPM      ALPRAZolam  1 mg Oral Once in imaging Bernabe Christianson DPM      vitamin C  1,000 mg Oral BID Belkys Kimball DPM      carvedilol  25 mg Oral BID With Meals Belkys Kimball DPM      cyclobenzaprine  5 mg Oral BID PRN Alvy Mask, PA-KAREN      glycerin-hypromellose-  1 drop Both Eyes Q4H PRN Belkys Kimball DPM      heparin (porcine)  5,000 Units Subcutaneous [facility] Belkys Kimball [place]      HYDROmorphone  0 5 mg Intravenous Q4H PRN Alvy Mask, CLARISSA      insulin lispro  2-12 Units Subcutaneous TID [facility] Belkys Kimball DPM      Loteprednol Etabonate  1 drop Ophthalmic BID Dianne Moreno, DO      oxyCODONE  10 mg Oral Q4H PRN Dianne Moreno, DO      oxyCODONE  5 mg Oral Q4H PRN Dianne Moreno, DO      piperacillin-tazobactam  3 375 g Intravenous Q6H Belkys Kimball DPM 3 375 g (06/05/22 0810)    traMADol  50 mg Oral BID PRN Tyree Joseph MD      vitamin E (tocopherol)  400 Units Oral Daily Belkys Kimball DPM      zinc sulfate  220 mg Oral Daily Belkys Kimball DPM          Today, Patient Was Seen By: Tyree Joseph MD    ** Please Note: Dictation voice to text software may have been used in the creation of this document   **

## 2022-06-05 NOTE — ASSESSMENT & PLAN NOTE
Lab Results   Component Value Date    HGBA1C 6 6 (H) 04/26/2022     Recent Labs     06/04/22  1608 06/04/22  2110 06/05/22  0713 06/05/22  1115   POCGLU 191* 149* 126 147*     · Continue sliding scale insulin

## 2022-06-05 NOTE — ASSESSMENT & PLAN NOTE
· VIVIANA on CKD 3 secondary to acute infection  · Did improve with IV fluids, creatinin peaked at 2 05  · Discontinued lisinopril/HCT  Increased IV fluids    · Creatinine now decreased to 1 8, continue to monitor  · This morning informed by nurse that patient had 670ml on bladder scan and just had urine output of 850ml  · Asked nurse to check PVR, VIVIANA may also be related to urinary retention    Results from last 7 days   Lab Units 06/05/22  0501 06/04/22  0510 06/03/22  0511 06/02/22  0437 06/01/22  0451 05/31/22  1045   BUN mg/dL 42* 49* 46* 44* 54* 49*   CREATININE mg/dL 1 83* 2 05* 1 94* 1 46* 1 99* 2 29*   EGFR ml/min/1 73sq m 39 34 36 51 35 30

## 2022-06-05 NOTE — ASSESSMENT & PLAN NOTE
Lab Results   Component Value Date    EGFR 39 06/05/2022    EGFR 34 06/04/2022    EGFR 36 06/03/2022    CREATININE 1 83 (H) 06/05/2022    CREATININE 2 05 (H) 06/04/2022    CREATININE 1 94 (H) 06/03/2022

## 2022-06-05 NOTE — ASSESSMENT & PLAN NOTE
· Patient reports no improvement with trial of gabapentin or ropinirole in the past  · Reports tramadol usually works    Started yesterday on tramadol at bedtime  · Reports today that it helped but still having issues and that he used to take tramadol more frequently in the past (up to 4x/day)  · Will switch from scheduled bedtime to BID PRN

## 2022-06-05 NOTE — PLAN OF CARE
Problem: Potential for Falls  Goal: Patient will remain free of falls  Description: INTERVENTIONS:  - Educate patient/family on patient safety including physical limitations  - Instruct patient to call for assistance with activity   - Consult OT/PT to assist with strengthening/mobility   - Keep Call bell within reach  - Keep bed low and locked with side rails adjusted as appropriate  - Keep care items and personal belongings within reach  - Initiate and maintain comfort rounds  - Make Fall Risk Sign visible to staff  - Apply yellow socks and bracelet for high fall risk patients  - Consider moving patient to room near nurses station  Outcome: Progressing     Problem: PAIN - ADULT  Goal: Verbalizes/displays adequate comfort level or baseline comfort level  Description: Interventions:  - Encourage patient to monitor pain and request assistance  - Assess pain using appropriate pain scale  - Administer analgesics based on type and severity of pain and evaluate response  - Implement non-pharmacological measures as appropriate and evaluate response  - Consider cultural and social influences on pain and pain management  - Notify physician/advanced practitioner if interventions unsuccessful or patient reports new pain  Outcome: Progressing     Problem: INFECTION - ADULT  Goal: Absence or prevention of progression during hospitalization  Description: INTERVENTIONS:  - Assess and monitor for signs and symptoms of infection  - Monitor lab/diagnostic results  - Monitor all insertion sites, i e  indwelling lines, tubes, and drains  - Monitor endotracheal if appropriate and nasal secretions for changes in amount and color  - Harvey appropriate cooling/warming therapies per order  - Administer medications as ordered  - Instruct and encourage patient and family to use good hand hygiene technique  - Identify and instruct in appropriate isolation precautions for identified infection/condition  Outcome: Progressing  Goal: Absence of fever/infection during neutropenic period  Description: INTERVENTIONS:  - Monitor WBC    Outcome: Progressing     Problem: SAFETY ADULT  Goal: Patient will remain free of falls  Description: INTERVENTIONS:  - Educate patient/family on patient safety including physical limitations  - Instruct patient to call for assistance with activity   - Consult OT/PT to assist with strengthening/mobility   - Keep Call bell within reach  - Keep bed low and locked with side rails adjusted as appropriate  - Keep care items and personal belongings within reach  - Initiate and maintain comfort rounds  - Make Fall Risk Sign visible to staff  - Apply yellow socks and bracelet for high fall risk patients  - Consider moving patient to room near nurses station  Outcome: Progressing  Goal: Maintain or return to baseline ADL function  Description: INTERVENTIONS:  -  Assess patient's ability to carry out ADLs; assess patient's baseline for ADL function and identify physical deficits which impact ability to perform ADLs (bathing, care of mouth/teeth, toileting, grooming, dressing, etc )  - Assess/evaluate cause of self-care deficits   - Assess range of motion  - Assess patient's mobility; develop plan if impaired  - Assess patient's need for assistive devices and provide as appropriate  - Encourage maximum independence but intervene and supervise when necessary  - Involve family in performance of ADLs  - Assess for home care needs following discharge   - Consider OT consult to assist with ADL evaluation and planning for discharge  - Provide patient education as appropriate  Outcome: Progressing  Goal: Maintains/Returns to pre admission functional level  Description: INTERVENTIONS:  - Perform BMAT or MOVE assessment daily    - Set and communicate daily mobility goal to care team and patient/family/caregiver     - Collaborate with rehabilitation services on mobility goals if consulted  - Out of bed for toileting  - Record patient progress and toleration of activity level   Outcome: Progressing     Problem: DISCHARGE PLANNING  Goal: Discharge to home or other facility with appropriate resources  Description: INTERVENTIONS:  - Identify barriers to discharge w/patient and caregiver  - Arrange for needed discharge resources and transportation as appropriate  - Identify discharge learning needs (meds, wound care, etc )  - Arrange for interpretive services to assist at discharge as needed  - Refer to Case Management Department for coordinating discharge planning if the patient needs post-hospital services based on physician/advanced practitioner order or complex needs related to functional status, cognitive ability, or social support system  Outcome: Progressing     Problem: Knowledge Deficit  Goal: Patient/family/caregiver demonstrates understanding of disease process, treatment plan, medications, and discharge instructions  Description: Complete learning assessment and assess knowledge base    Interventions:  - Provide teaching at level of understanding  - Provide teaching via preferred learning methods  Outcome: Progressing     Problem: METABOLIC, FLUID AND ELECTROLYTES - ADULT  Goal: Fluid balance maintained  Description: INTERVENTIONS:  - Monitor labs   - Monitor I/O and WT  - Instruct patient on fluid and nutrition as appropriate  - Assess for signs & symptoms of volume excess or deficit  Outcome: Progressing  Goal: Glucose maintained within target range  Description: INTERVENTIONS:  - Monitor Blood Glucose as ordered  - Assess for signs and symptoms of hyperglycemia and hypoglycemia  - Administer ordered medications to maintain glucose within target range  - Assess nutritional intake and initiate nutrition service referral as needed  Outcome: Progressing     Problem: HEMATOLOGIC - ADULT  Goal: Maintains hematologic stability  Description: INTERVENTIONS  - Assess for signs and symptoms of bleeding or hemorrhage  - Monitor labs  - Administer supportive blood products/factors as ordered and appropriate  Outcome: Progressing     Problem: MOBILITY - ADULT  Goal: Maintain or return to baseline ADL function  Description: INTERVENTIONS:  -  Assess patient's ability to carry out ADLs; assess patient's baseline for ADL function and identify physical deficits which impact ability to perform ADLs (bathing, care of mouth/teeth, toileting, grooming, dressing, etc )  - Assess/evaluate cause of self-care deficits   - Assess range of motion  - Assess patient's mobility; develop plan if impaired  - Assess patient's need for assistive devices and provide as appropriate  - Encourage maximum independence but intervene and supervise when necessary  - Involve family in performance of ADLs  - Assess for home care needs following discharge   - Consider OT consult to assist with ADL evaluation and planning for discharge  - Provide patient education as appropriate  Outcome: Progressing  Goal: Maintains/Returns to pre admission functional level  Description: INTERVENTIONS:  - Perform BMAT or MOVE assessment daily    - Set and communicate daily mobility goal to care team and patient/family/caregiver     - Collaborate with rehabilitation services on mobility goals if consulted  - Out of bed for toileting  - Record patient progress and toleration of activity level   Outcome: Progressing

## 2022-06-05 NOTE — ANESTHESIA PREPROCEDURE EVALUATION
Procedure:  DEBRIDEMENT WOUND [facility] OUT) (Left Foot)  APPLICATION VAC DRESSING (Left Foot)    Relevant Problems   CARDIO   (+) Essential hypertension      /RENAL   (+) Acute kidney injury superimposed on chronic kidney disease (HCC)   (+) Chronic kidney disease, stage 3 (HCC)          MUSCULOSKELETAL   (+) Low back pain with sciatica      (+) restless leg syndrome    (+) DM    (+) osteomyelitis of L foot  Physical Exam    Airway    Mallampati score: III  TM Distance: >3 FB  Neck ROM: full     Dental       Cardiovascular  Rhythm: regular, Rate: normal,     Pulmonary  Breath sounds clear to auscultation,     Other Findings        Anesthesia Plan  ASA Score- 3     Anesthesia Type- general with ASA Monitors  Additional Monitors:   Airway Plan:           Plan Factors-    Chart reviewed  Existing labs reviewed  Induction- intravenous  Postoperative Plan- Plan for postoperative opioid use  Planned trial extubation    Informed Consent- Anesthetic plan and risks discussed with patient

## 2022-06-05 NOTE — ASSESSMENT & PLAN NOTE
· Follows with Dr Robert Guerra ([phone])  · Was supposed to have sutures removed but patient has not followed up yet    · Continue eyedrops but advised need for close follow-up

## 2022-06-05 NOTE — PROGRESS NOTES
Podiatry - Progress Note  Patient: Shanda Jesus 59 y o  male   MRN: [id number]  PCP: Azael Sanford MD  Unit/Bed#: E5 -23 Encounter: [id number]  Date Of Visit: 22    ASSESSMENT:    Shanda Jesus is a 59 y o  male with:    1  Diabetic ulceration left lateral 5th MTPJ with underlying osteomyelitis - Concepcion 3  1  S/p R partial 5th ray amputation (DOS 22)  2  Dorsal 2nd digit eschar, left foot  3  T2DM  4  VIVIANA on CKD2  5  Lumbar radiculopathy    PLAN:    · Incision site appears well coapted with skin edges well approximated  All sutures in place and intact  Surrounding tissue remains erythematous, edematous, warm to touch with no improvement since yesterdays exam   We will schedule patient for wound debridement/washout with possible VAC application tomorrow, 15/27/7994  · Follow-up LEADs given patient history of non-healing  · NPO starting at 0900 tomorrow  · Continue IV Zosyn per Infectious Disease  Appreciate their recommendations  · Elevation and offloading on green foam wedges or pillows when non-ambulatory  · Rest of care per primary team      Weightbearing status:  Strict nonweightbearing left lower extremity    SUBJECTIVE:     The patient was seen, evaluated, and assessed at bedside today  The patient was awake, alert, and in no acute distress  No acute events overnight  The patient reports moderate pain with dressing changes  Patient denies N/V/F/chills/SOB/CP  OBJECTIVE:     Vitals:   /86   Pulse 84   Temp 98 6 °F (37 °C)   Resp 18   Ht 6' (1 829 m)   Wt 120 kg (264 lb 8 8 oz)   SpO2 93%   BMI 35 88 kg/m²     Temp (24hrs), Av 6 °F (37 °C), Min:97 8 °F (36 6 °C), Max:99 °F (37 2 °C)      Physical Exam:     General: Alert, cooperative and no distress  Lungs: Non labored breathing  Abdomen: Soft, non-tender  Lower extremity exam:  Cardiovascular status at baseline  Neurological status at baseline  Musculoskeletal status at baseline  No calf tenderness noted   Incision site appears well coapted with skin edges well approximated  All sutures in place and intact  Surrounding tissue remains erythematous, edematous, warm to touch  Adequate capillary refill noted at the plantar aspect of the incision site  Areas along the dorsal aspect of the incision site are non blanchable and painful on palpation  Clinical Images 06/05/22:      Additional Data:     Labs:    Results from last 7 days   Lab Units 06/05/22  0501 06/03/22  0511 06/02/22  0437   WBC Thousand/uL 6 25   < > 5 74   HEMOGLOBIN g/dL 10 7*   < > 12 6   HEMATOCRIT % 33 3*   < > 38 5   PLATELETS Thousands/uL 232   < > 205   NEUTROS PCT %  --   --  70   LYMPHS PCT %  --   --  13*   MONOS PCT %  --   --  13*   EOS PCT %  --   --  3    < > = values in this interval not displayed  Results from last 7 days   Lab Units 06/05/22  0501   POTASSIUM mmol/L 4 7   CHLORIDE mmol/L 102   CO2 mmol/L 22   BUN mg/dL 42*   CREATININE mg/dL 1 83*   CALCIUM mg/dL 8 7   ALK PHOS U/L 75   ALT U/L 19   AST U/L 13     Results from last 7 days   Lab Units 05/31/22  1045   INR  1 14       * I Have Reviewed All Lab Data Listed Above  Recent Cultures (last 7 days):     Results from last 7 days   Lab Units 06/01/22  1814 05/31/22  1054 05/31/22  1045   BLOOD CULTURE   --  No Growth After 4 Days  No Growth After 4 Days  GRAM STAIN RESULT  Rare Polys  No organisms seen  --   --      Results from last 7 days   Lab Units 06/01/22  1814   ANAEROBIC CULTURE  No anaerobes isolated       Imaging: I have personally reviewed pertinent films in PACS  EKG, Pathology, and Other Studies: I have personally reviewed pertinent reports  ** Please Note: Portions of the record may have been created with voice recognition software  Occasional wrong word or "sound a like" substitutions may have occurred due to the inherent limitations of voice recognition software  Read the chart carefully and recognize, using context, where substitutions have occurred   **

## 2022-06-06 ENCOUNTER — APPOINTMENT (INPATIENT)
Dept: NON INVASIVE DIAGNOSTICS | Facility: HOSPITAL | Age: 57
DRG: 617 | End: 2022-06-06
Payer: MEDICARE

## 2022-06-06 ENCOUNTER — ANESTHESIA (INPATIENT)
Dept: PERIOP | Facility: HOSPITAL | Age: 57
DRG: 617 | End: 2022-06-06
Payer: MEDICARE

## 2022-06-06 PROBLEM — G25.81 RLS (RESTLESS LEGS SYNDROME): Status: RESOLVED | Noted: 2022-06-04 | Resolved: 2022-06-06

## 2022-06-06 LAB
GLUCOSE SERPL-MCNC: 109 MG/DL (ref 65–140)
GLUCOSE SERPL-MCNC: 115 MG/DL (ref 65–140)
GLUCOSE SERPL-MCNC: 126 MG/DL (ref 65–140)
GLUCOSE SERPL-MCNC: 127 MG/DL (ref 65–140)
GLUCOSE SERPL-MCNC: 148 MG/DL (ref 65–140)

## 2022-06-06 PROCEDURE — 93922 UPR/L XTREMITY ART 2 LEVELS: CPT | Performed by: SURGERY

## 2022-06-06 PROCEDURE — 82948 REAGENT STRIP/BLOOD GLUCOSE: CPT

## 2022-06-06 PROCEDURE — 99232 SBSQ HOSP IP/OBS MODERATE 35: CPT | Performed by: INTERNAL MEDICINE

## 2022-06-06 PROCEDURE — 0JBR0ZZ EXCISION OF LEFT FOOT SUBCUTANEOUS TISSUE AND FASCIA, OPEN APPROACH: ICD-10-PCS | Performed by: PODIATRIST

## 2022-06-06 PROCEDURE — 93925 LOWER EXTREMITY STUDY: CPT | Performed by: SURGERY

## 2022-06-06 PROCEDURE — 93925 LOWER EXTREMITY STUDY: CPT

## 2022-06-06 PROCEDURE — 93923 UPR/LXTR ART STDY 3+ LVLS: CPT

## 2022-06-06 RX ORDER — MIDAZOLAM HYDROCHLORIDE 2 MG/2ML
INJECTION, SOLUTION INTRAMUSCULAR; INTRAVENOUS AS NEEDED
Status: DISCONTINUED | OUTPATIENT
Start: 2022-06-06 | End: 2022-06-06

## 2022-06-06 RX ORDER — MAGNESIUM HYDROXIDE 1200 MG/15ML
LIQUID ORAL AS NEEDED
Status: DISCONTINUED | OUTPATIENT
Start: 2022-06-06 | End: 2022-06-06 | Stop reason: HOSPADM

## 2022-06-06 RX ORDER — ONDANSETRON 2 MG/ML
INJECTION INTRAMUSCULAR; INTRAVENOUS AS NEEDED
Status: DISCONTINUED | OUTPATIENT
Start: 2022-06-06 | End: 2022-06-06

## 2022-06-06 RX ORDER — FENTANYL CITRATE 50 UG/ML
INJECTION, SOLUTION INTRAMUSCULAR; INTRAVENOUS AS NEEDED
Status: DISCONTINUED | OUTPATIENT
Start: 2022-06-06 | End: 2022-06-06

## 2022-06-06 RX ORDER — EPHEDRINE SULFATE 50 MG/ML
INJECTION INTRAVENOUS AS NEEDED
Status: DISCONTINUED | OUTPATIENT
Start: 2022-06-06 | End: 2022-06-06

## 2022-06-06 RX ORDER — FENTANYL CITRATE 50 UG/ML
25 INJECTION, SOLUTION INTRAMUSCULAR; INTRAVENOUS
Status: DISCONTINUED | OUTPATIENT
Start: 2022-06-06 | End: 2022-06-06 | Stop reason: HOSPADM

## 2022-06-06 RX ORDER — SODIUM CHLORIDE 9 MG/ML
125 INJECTION, SOLUTION INTRAVENOUS CONTINUOUS
Status: DISCONTINUED | OUTPATIENT
Start: 2022-06-06 | End: 2022-06-07

## 2022-06-06 RX ORDER — ONDANSETRON 2 MG/ML
4 INJECTION INTRAMUSCULAR; INTRAVENOUS EVERY 6 HOURS PRN
Status: DISCONTINUED | OUTPATIENT
Start: 2022-06-06 | End: 2022-06-06 | Stop reason: HOSPADM

## 2022-06-06 RX ORDER — PROPOFOL 10 MG/ML
INJECTION, EMULSION INTRAVENOUS AS NEEDED
Status: DISCONTINUED | OUTPATIENT
Start: 2022-06-06 | End: 2022-06-06

## 2022-06-06 RX ADMIN — HEPARIN SODIUM 5000 UNITS: 5000 INJECTION INTRAVENOUS; SUBCUTANEOUS at 21:21

## 2022-06-06 RX ADMIN — SODIUM CHLORIDE, SODIUM LACTATE, POTASSIUM CHLORIDE, AND CALCIUM CHLORIDE: .6; .31; .03; .02 INJECTION, SOLUTION INTRAVENOUS at 18:04

## 2022-06-06 RX ADMIN — OXYCODONE HYDROCHLORIDE 10 MG: 10 TABLET ORAL at 03:04

## 2022-06-06 RX ADMIN — LOTEPREDNOL ETABONATE 1 DROP: 10 SUSPENSION TOPICAL at 21:26

## 2022-06-06 RX ADMIN — LOTEPREDNOL ETABONATE 1 DROP: 10 SUSPENSION TOPICAL at 08:53

## 2022-06-06 RX ADMIN — PIPERACILLIN AND TAZOBACTAM 3.38 G: 3; .375 INJECTION, POWDER, LYOPHILIZED, FOR SOLUTION INTRAVENOUS at 14:33

## 2022-06-06 RX ADMIN — GLYCERIN, HYPROMELLOSE, POLYETHYLENE GLYCOL 1 DROP: .2; .2; 1 LIQUID OPHTHALMIC at 16:52

## 2022-06-06 RX ADMIN — PROPOFOL 200 MG: 10 INJECTION, EMULSION INTRAVENOUS at 17:35

## 2022-06-06 RX ADMIN — CHLORHEXIDINE GLUCONATE 0.12% ORAL RINSE 15 ML: 1.2 LIQUID ORAL at 17:04

## 2022-06-06 RX ADMIN — PIPERACILLIN AND TAZOBACTAM 3.38 G: 3; .375 INJECTION, POWDER, LYOPHILIZED, FOR SOLUTION INTRAVENOUS at 21:20

## 2022-06-06 RX ADMIN — EPHEDRINE SULFATE 7.5 MG: 50 INJECTION, SOLUTION INTRAVENOUS at 18:03

## 2022-06-06 RX ADMIN — SODIUM CHLORIDE, SODIUM LACTATE, POTASSIUM CHLORIDE, AND CALCIUM CHLORIDE 100 ML/HR: .6; .31; .03; .02 INJECTION, SOLUTION INTRAVENOUS at 09:58

## 2022-06-06 RX ADMIN — PIPERACILLIN AND TAZOBACTAM 3.38 G: 3; .375 INJECTION, POWDER, LYOPHILIZED, FOR SOLUTION INTRAVENOUS at 08:52

## 2022-06-06 RX ADMIN — CARVEDILOL 25 MG: 25 TABLET, FILM COATED ORAL at 16:51

## 2022-06-06 RX ADMIN — FENTANYL CITRATE 50 MCG: 50 INJECTION INTRAMUSCULAR; INTRAVENOUS at 17:35

## 2022-06-06 RX ADMIN — Medication 400 UNITS: at 08:50

## 2022-06-06 RX ADMIN — MIDAZOLAM 2 MG: 1 INJECTION INTRAMUSCULAR; INTRAVENOUS at 17:35

## 2022-06-06 RX ADMIN — OXYCODONE HYDROCHLORIDE 10 MG: 10 TABLET ORAL at 08:52

## 2022-06-06 RX ADMIN — OXYCODONE HYDROCHLORIDE AND ACETAMINOPHEN 1000 MG: 500 TABLET ORAL at 21:21

## 2022-06-06 RX ADMIN — GLYCERIN, HYPROMELLOSE, POLYETHYLENE GLYCOL 1 DROP: .2; .2; 1 LIQUID OPHTHALMIC at 21:26

## 2022-06-06 RX ADMIN — PIPERACILLIN AND TAZOBACTAM 3.38 G: 3; .375 INJECTION, POWDER, LYOPHILIZED, FOR SOLUTION INTRAVENOUS at 02:45

## 2022-06-06 RX ADMIN — OXYCODONE HYDROCHLORIDE 10 MG: 10 TABLET ORAL at 21:21

## 2022-06-06 RX ADMIN — ZINC SULFATE 220 MG (50 MG) CAPSULE 220 MG: CAPSULE at 08:49

## 2022-06-06 RX ADMIN — ONDANSETRON 4 MG: 2 INJECTION INTRAMUSCULAR; INTRAVENOUS at 17:35

## 2022-06-06 RX ADMIN — CARVEDILOL 25 MG: 25 TABLET, FILM COATED ORAL at 08:50

## 2022-06-06 RX ADMIN — HYDROMORPHONE HYDROCHLORIDE 0.5 MG: 1 INJECTION, SOLUTION INTRAMUSCULAR; INTRAVENOUS; SUBCUTANEOUS at 10:48

## 2022-06-06 RX ADMIN — OXYCODONE HYDROCHLORIDE AND ACETAMINOPHEN 1000 MG: 500 TABLET ORAL at 08:50

## 2022-06-06 NOTE — ASSESSMENT & PLAN NOTE
61-year-old gentleman with diabetes mellitus presents with recurrent osteomyelitis of left foot  1  Diabetic ulceration left lateral 5th MTPJ with underlying osteomyelitis  - S/p R partial 5th ray amputation, 6/1/2022        - OR 6/6/22 for left foot washout and vac application     · Patient to go to OR today,06/06/22, for  left foot washout and vac application with Dr Radha Munguia          Wound cx with Pseudomonas- sensitivities reviewed

## 2022-06-06 NOTE — ASSESSMENT & PLAN NOTE
· Follows with Dr Dulce Coleman ([phone])  · Was supposed to have sutures removed but patient has not followed up yet    · Continue eyedrops but advised need for close follow-up

## 2022-06-06 NOTE — ASSESSMENT & PLAN NOTE
Lab Results   Component Value Date    HGBA1C 6 6 (H) 04/26/2022     Recent Labs     06/06/22  0733 06/06/22  1059 06/06/22  1602 06/06/22  1832   POCGLU 127 148* 126 109     · Continue sliding scale insulin and basal bolus protocol

## 2022-06-06 NOTE — PROGRESS NOTES
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  Patient is status post left 5th ray resection, for surgical cure  Operative culture with growth of Pseudomonas again  Podiatry foot exam noted  There is evidence of ongoing cellulitis  Plan for repeat I&D later today noted  Continue IV Zosyn for now  Plan for repeat I&D later today noted      2  CKD stage 3  Creatinine stable  Antibiotic dosages adjusted accordingly  Monitor creatinine      3  DM, type 2  Elevated blood sugar is risk for wound nonhealing and infection above  Management per primary service      4  Morbid obesity      Discussed with patient in detail regarding the above plan      Antibiotics:  Zosyn  POD # 5     Subjective:  Patient's foot pain slowly improving  Temperature stays down  No chills  He is tolerating antibiotic well  No nausea, vomiting or diarrhea      Objective:  Vitals:  Temp:  [98 2 °F (36 8 °C)-99 2 °F (37 3 °C)] 99 2 °F (37 3 °C)  HR:  [81-96] 89  Resp:  [18] 18  BP: (108-153)/(72-86) 131/73  SpO2:  [92 %-95 %] 92 %  Temp (24hrs), Av 5 °F (36 9 °C), Min:98 2 °F (36 8 °C), Max:99 2 °F (37 3 °C)  Current: Temperature: 99 2 °F (37 3 °C)    Physical Exam:     General: Awake, alert, cooperative, no distress  Neck:  Supple  No mass  No lymphadenopathy  Lungs: Expansion symmetric, no rales, no wheezing, respirations unlabored  Heart:  Regular rate and rhythm, S1 and S2 normal, no murmur  Abdomen: Soft, nondistended, non-tender, bowel sounds active all four quadrants, no masses, no organomegaly  Extremities: Stable leg edema  Left foot with dressing in place  Dressing is dry  No erythema/warmth beyond dressing  Mild to moderate tenderness  Skin:  No rash  Neuro: Moves all extremities       Invasive Devices Report    Peripheral Intravenous Line  Duration           Peripheral IV 06/04/22 Dorsal (posterior); Right Hand 2 days                Labs studies:   I have personally reviewed pertinent labs  Results from last 7 days   Lab Units 06/05/22  0501 06/04/22  0510 06/03/22  0511   POTASSIUM mmol/L 4 7 4 7 4 8   CHLORIDE mmol/L 102 102 104   CO2 mmol/L 22 23 23   BUN mg/dL 42* 49* 46*   CREATININE mg/dL 1 83* 2 05* 1 94*   EGFR ml/min/1 73sq m 39 34 36   CALCIUM mg/dL 8 7 8 5 8 3   AST U/L 13 12 10   ALT U/L 19 22 20   ALK PHOS U/L 75 66 60     Results from last 7 days   Lab Units 06/05/22  0501 06/04/22  0510 06/03/22  0511   WBC Thousand/uL 6 25 6 07 5 70   HEMOGLOBIN g/dL 10 7* 10 6* 10 9*   PLATELETS Thousands/uL 232 210 188     Results from last 7 days   Lab Units 06/01/22  1814 05/31/22  1054 05/31/22  1045   BLOOD CULTURE   --  No Growth After 5 Days  No Growth After 5 Days  GRAM STAIN RESULT  Rare Polys  No organisms seen  --   --        Imaging Studies:   I have personally reviewed pertinent imaging study reports and images in PACS  EKG, Pathology, and Other Studies:   I have personally reviewed pertinent reports

## 2022-06-06 NOTE — ANESTHESIA POSTPROCEDURE EVALUATION
Post-Op Assessment Note    CV Status:  Stable  Pain Score: 0    Pain management: adequate     Mental Status:  Alert and awake   Hydration Status:  Euvolemic   PONV Controlled:  Controlled   Airway Patency:  Patent   Two or more mitigation strategies used for obstructive sleep apnea   Post Op Vitals Reviewed: Yes      Staff: Anesthesiologist, CRNA         No complications documented      /69 (06/06/22 1840)    Temp      Pulse 80 (06/06/22 1840)   Resp 12 (06/06/22 1840)    SpO2 95 % (06/06/22 1840)

## 2022-06-06 NOTE — OP NOTE
OPERATIVE REPORT - Podiatry  PATIENT NAME: Srinath Burden    :  1965  MRN: [id number]  Pt Location: [facility] OR ROOM 06    SURGERY DATE: 2022    Surgeon(s) and Role:     * Cele Del Cid DPM - Primary     * Zander Yadav DPM - Assisting    Pre-op Diagnosis:  Diabetic ulcer of left foot ([facility] [address] ) [M42 358, L97 529]    Post-Op Diagnosis Codes:     * Diabetic ulcer of left foot ([facility] [address] ) [E11 621, L97 529]    Procedure(s) (LRB):  DEBRIDEMENT WOUND ([address] OUT) (Left)    Specimen(s):  * No specimens in log *    Estimated Blood Loss:   Minimal    Drains:  Open Drain Left;Lateral Foot (Active)   Number of days: 0       Anesthesia Type:   Choice with 20 ml of 1% Lidocaine and 0 5% Bupivacaine in a 1:1 mixture prior to incision  Hemostasis:  Anatomic dissection    Materials:  * No implants in log *  3-0 Vicryl  2-0 nylon    Operative Findings:  Consistent with diagnosis  Remaining 5th metatarsal appeared hard, viable  No deep sinus tracts or purulence visualized  Complications:   None    Procedure and Technique:     Under mild sedation, the patient was brought into the operating room and remained on the stretcher in the supine position  IV sedation was achieved by anesthesia team and a universal timeout was performed where all parties are in agreement of correct patient, correct procedure and correct site  A local block was performed consisting of 20 ml of 1% Lidocaine and 0 5% Bupivacaine in a 1:1 mixture  The foot was then prepped and draped in the usual aseptic manner  Attention was directed to the previous lateral left foot surgical incision  Utilizing 15 blade all sutures removed from the lateral left foot incision  The incision was then opened utilizing a hemostat  The wound bed was then explored utilizing blunt dissection with a hemostat  All tissue appeared healthy, bleeding, viable without any deep sinus tracts or purulence  The 5th metatarsal was visualized and appeared hard to touch, viable      The surgical incision was irrigated with copious amounts of normal sterile saline utilizing pulse lavage  Subcutaneous closure was obtained utilizing 3-0 Vicryl  Skin edges were reapproximated and closure was obtained utilizing 2-0  Nylon  A half-inch Penrose drain was then placed at the most proximal aspect of the incision  The foot was then cleansed and dried  The incision site was dressed with Xeroform and a dry sterile dressing  The patient tolerated the procedure and anesthesia well and was transported to the PACU with vital signs stable  As with many limb salvage procedures, we contemplate the possibility of performing further stages to this procedure  Procedures may include debridements, delayed closure, plastic surgery techniques, or more proximal amputations  This procedure may be considered part of a multi-staged limb salvage treatment plan  Dr Davonte Siddiqui was present during the entire procedure and participated in all key aspects  SIGNATURE: Melodi Phalen, DPM  DATE: June 6, 2022  TIME: 6:24 PM      Portions of the record may have been created with voice recognition software  Occasional wrong word or "sound a like" substitutions may have occurred due to the inherent limitations of voice recognition software  Read the chart carefully and recognize, using context, where substitutions have occurred

## 2022-06-06 NOTE — ASSESSMENT & PLAN NOTE
· Continue carvedilol but discontinued lisinopril/HCT given worsening renal function  · Monitor renal function, especially in the setting of Zosyn

## 2022-06-06 NOTE — PROGRESS NOTES
[facility]  Progress Note Gino Evans 1965, 59 y o  male MRN: [id number]  Unit/Bed#: OR [place] Encounter: [id number]  Primary Care Provider: Cristian Rodriguez MD   Date and time admitted to hospital: 5/31/2022  9:47 AM    * Diabetic ulcer of left foot [facility])  Assessment & Plan  57-year-old gentleman with diabetes mellitus presents with recurrent osteomyelitis of left foot  1  Diabetic ulceration left lateral 5th MTPJ with underlying osteomyelitis  - S/p R partial 5th ray amputation, 6/1/2022        - OR 6/6/22 for left foot washout and vac application     · Patient to go to OR today,06/06/22, for  left foot washout and vac application with Dr Meek Thomas  Wound cx with Pseudomonas- sensitivities reviewed        Acute kidney injury superimposed on chronic kidney disease ([facility] [address] )  Assessment & Plan  · VIVIANA on CKD 3 secondary to acute infection  · Did improve with IV fluids, creatinin peaked at 2 05  · Discontinued lisinopril/HCT  ·     Results from last 7 days   Lab Units 06/05/22  0501 06/04/22  0510 06/03/22  0511 06/02/22  0437 06/01/22  0451 05/31/22  1045   BUN mg/dL 42* 49* 46* 44* 54* 49*   CREATININE mg/dL 1 83* 2 05* 1 94* 1 46* 1 99* 2 29*   EGFR ml/min/1 73sq m 39 34 39 51 28 30       H/O eye surgery  Assessment & Plan  · Follows with Dr Tasha Messina ([phone])  · Was supposed to have sutures removed but patient has not followed up yet  · Continue eyedrops but advised need for close follow-up    Obesity  Assessment & Plan  · Body mass index is 35 88 kg/m²      Diabetes mellitus [facility])  Assessment & Plan  Lab Results   Component Value Date    HGBA1C 6 6 (H) 04/26/2022     Recent Labs     06/06/22  0733 06/06/22  1059 06/06/22  1602 06/06/22  1832   POCGLU 127 148* 126 109     · Continue sliding scale insulin and basal bolus protocol    Essential hypertension  Assessment & Plan  · Continue carvedilol but discontinued lisinopril/HCT given worsening renal function  · Monitor renal function, especially in the setting of [address] [facility] Progress Note  Patient: Eleno Lerner 59 y o  male   MRN: [id number]  PCP: Speedy Barbour MD  Unit/Bed#: OR [place] Encounter: [id number]  Date Of Visit: 22    Assessment:    Principal Problem:    Diabetic ulcer of left foot ([facility] )  Active Problems:    Essential hypertension    Diabetes mellitus ([facility] )    Obesity    H/O eye surgery    Acute kidney injury superimposed on chronic kidney disease ([facility] )      Plan:    · Continue current medical management  · Discharge planning  · Operating room today       VTE Pharmacologic Prophylaxis:   Pharmacologic: Heparin  Mechanical VTE Prophylaxis in Place: Yes    Patient Centered Rounds: I have performed bedside rounds with nursing staff today  Discussions with Specialists or Other Care Team Provider:  Case management    Education and Discussions with Family / Patient:  Patient can update family members    Time Spent for Care: 45 minutes  More than 50% of total time spent on counseling and coordination of care as described above  Current Length of Stay: 6 day(s)    Current Patient Status: Inpatient   Certification Statement: The patient will continue to require additional inpatient hospital stay due to Operative management, intravenous antibiotic    Discharge Plan / Estimated Discharge Date: To be determined    Code Status: Level 3 - DNAR and DNI      Subjective:   Seen examined, no acute complaints  Tolerating oral diet, awaiting surgical intervention later today    A complete and comprehensive 14 point organ system review has been performed and all other systems are negative other than stated above  Objective:     Vitals:   Temp (24hrs), Av 6 °F (37 °C), Min:98 4 °F (36 9 °C), Max:99 2 °F (37 3 °C)    Temp:  [98 4 °F (36 9 °C)-99 2 °F (37 3 °C)] 98 5 °F (36 9 °C)  HR:  [78-89] 78  Resp:  [12-18] 13  BP: (119-138)/() 129/71  SpO2:  [90 %-96 %] 95 %  Body mass index is 35 88 kg/m²  Input and Output Summary (last 24 hours): Intake/Output Summary (Last 24 hours) at 6/6/2022 1934  Last data filed at 6/6/2022 1924  Gross per 24 hour   Intake 1300 ml   Output --   Net 1300 ml       Physical Exam:     General:  Appears older than stated age, no acute distress  HEENT: atraumatic, PERRLA, moist mucosa, normal pharynx, normal tonsils and adenoids, normal tongue, no fluid in sinuses  Neck: Trachea midline, no carotid bruit, no masses  Respiratory: normal chest wall expansion, CTA B, no r/r/w, no rubs  Cardiovascular: RRR, no m/r/g, Normal S1 and S2  Abdomen: Soft, non-tender, non-distended, normal bowel sounds in all quadrants, no hepatosplenomegaly, no tympany  Rectal: deferred  Musculoskeletal: normal ROM in upper and lower extremities  Integumentary:  Dressing clear dry and intact  Heme/Lymph: no lymphadenopathy, no bruises  Neurological: Cranial Nerves II-XII grossly intact, no tics, normal sensation to pressure and light touch  Psychiatric: cooperative with normal mood, affect, and cognition      Additional Data:     Labs:    Results from last 7 days   Lab Units 06/05/22  0501 06/03/22  0511 06/02/22  0437   WBC Thousand/uL 6 25   < > 5 74   HEMOGLOBIN g/dL 10 7*   < > 12 6   HEMATOCRIT % 33 3*   < > 38 5   PLATELETS Thousands/uL 232   < > 205   NEUTROS PCT %  --   --  70   LYMPHS PCT %  --   --  13*   MONOS PCT %  --   --  13*   EOS PCT %  --   --  3    < > = values in this interval not displayed  Results from last 7 days   Lab Units 06/05/22  0501   POTASSIUM mmol/L 4 7   CHLORIDE mmol/L 102   CO2 mmol/L 22   BUN mg/dL 42*   CREATININE mg/dL 1 83*   CALCIUM mg/dL 8 7   ALK PHOS U/L 75   ALT U/L 19   AST U/L 13     Results from last 7 days   Lab Units 05/31/22  1045   INR  1 14       * I Have Reviewed All Lab Data Listed Above  * Additional Pertinent Lab Tests Reviewed:  All Labs For Current Hospital Admission Reviewed    Imaging:    Imaging Reports Reviewed Today Include:  No new imaging  Imaging Personally Reviewed by Myself Includes:  No new imaging    Recent Cultures (last 7 days):     Results from last 7 days   Lab Units 06/01/22  1814 05/31/22  1054 05/31/22  1045   BLOOD CULTURE   --  No Growth After 5 Days  No Growth After 5 Days     GRAM STAIN RESULT  Rare Polys  No organisms seen  --   --        Last 24 Hours Medication List:   Current Facility-Administered Medications   Medication Dose Route Frequency Provider Last Rate    [MAR Hold] acetaminophen  650 mg Oral Q6H PRN CARLOS MANUEL Lr      [MAR Hold] ALPRAZolam  1 mg Oral Once in imaging Rosemary Barker [facility] Hold] vitamin C  1,000 mg Oral BID Rosemary Barker [facility] Hold] carvedilol  25 mg Oral BID With Meals Rosemary Barker [facility] Hold] cyclobenzaprine  5 mg Oral BID PRN Haritha Xie PA-C      fentanyl citrate (PF)  25 mcg Intravenous Q10 Min PRN Ally Acuña [facility] Hold] glycerin-hypromellose-  1 drop Both Eyes Q4H PRN Rosemary Barker [facility] Hold] heparin (porcine)  5,000 Units Subcutaneous [facility] HYDROmorphone  0 5 mg Intravenous Q4H PRN Haritha Xie PA-C      [facility] Hold] insulin lispro  2-12 Units Subcutaneous TID [facility] Rosemary Barker [facility]      lactated ringers  100 mL/hr Intravenous Continuous Terry TRE Menon 100 mL/hr (06/06/22 1825)    [MAR Hold] Loteprednol Etabonate  1 drop Ophthalmic BID Ramón Orellana,       ondansetron  4 mg Intravenous Q6H PRN Ally Acuña [facility] Hold] oxyCODONE  10 mg Oral Q4H PRN Davy Henson [facility] Hold] piperacillin-tazobactam  3 375 g Intravenous Q6H Rosemary Barker DPM 3 375 g (06/06/22 1433)    [MAR Hold] traMADol  50 mg Oral BID PRN Maksim Forrest MD      [facility] Hold] vitamin E (tocopherol)  400 Units Oral Daily Rosemary Barker DPM      [facility] Hold] zinc sulfate  220 mg Oral Daily Rosemary Barker DPM          Today, Patient Was Seen By: Julia Aguero DO    ** Please Note: This note was completed in part utilizing M-Modal Fluency Direct Software  Grammatical errors, random word insertions, spelling mistakes, and incomplete sentences may be an occasional consequence of this system secondary to software limitations, ambient noise, and hardware issues  If you have any questions or concerns about the content, text, or information contained within the body of this dictation, please contact the provider for clarification   **

## 2022-06-06 NOTE — PROGRESS NOTES
Podiatry - Progress Note  Patient: Sanket Mcgee 59 y o  male   MRN: [id number]  PCP: Linn Cruz MD  Unit/Bed#: E5 -40 Encounter: [id number]  Date Of Visit: 22    ASSESSMENT:    Sanket Mcgee is a 59 y o  male with:    Diabetic ulceration left lateral 5th MTPJ with underlying osteomyelitis  - S/p R partial 5th ray amputation (DOS 22)  Dorsal 2nd digit eschar, left foot  T2DM  VIVIANA on CKD2  Lumbar radiculopathy    PLAN:     Patient to go to OR today,22, for  left foot washout and vac application with Dr Sandy Juares  Confirmed NPO status  H&P, vitals, and current labs reviewed  No acute changes noted  Alternatives, risks, and complications discussed with patient  All questions answered  No guarantees given of outcome  Rest of medical care per primary team        SUBJECTIVE:     The patient was seen, evaluated, and assessed at bedside today  The patient was awake, alert, and in no acute distress  Patient confirmed NPO status  All questions and concerns regarding the surgical procedure addressed  Patient understands risks vs benefits of procedure and remains amenable with plan for surgery today  Patient denies N/V/F/chills/SOB/CP  OBJECTIVE:     Vitals:   /73   Pulse 89   Temp 99 2 °F (37 3 °C)   Resp 18   Ht 6' (1 829 m)   Wt 120 kg (264 lb 8 8 oz)   SpO2 92%   BMI 35 88 kg/m²     Temp (24hrs), Av 5 °F (36 9 °C), Min:98 2 °F (36 8 °C), Max:99 2 °F (37 3 °C)      Physical Exam:     General:  Alert, cooperative, and in no distress  Lower extremity exam:  Cardiovascular status at baseline  Neurological status at baseline  Musculoskeletal status at baseline  No calf tenderness noted bilaterally  Dressing left intact to the Operating Room       Additional Data:     Labs:    Results from last 7 days   Lab Units 22  0501 22  0511 22  0437   WBC Thousand/uL 6 25   < > 5 74   HEMOGLOBIN g/dL 10 7*   < > 12 6   HEMATOCRIT % 33 3*   < > 38 5   PLATELETS Thousands/uL 232   < > 205   NEUTROS PCT %  --   --  70   LYMPHS PCT %  --   --  13*   MONOS PCT %  --   --  13*   EOS PCT %  --   --  3    < > = values in this interval not displayed  Results from last 7 days   Lab Units 06/05/22  0501   POTASSIUM mmol/L 4 7   CHLORIDE mmol/L 102   CO2 mmol/L 22   BUN mg/dL 42*   CREATININE mg/dL 1 83*   CALCIUM mg/dL 8 7   ALK PHOS U/L 75   ALT U/L 19   AST U/L 13     Results from last 7 days   Lab Units 05/31/22  1045   INR  1 14       * I Have Reviewed All Lab Data Listed Above  Recent Cultures (last 7 days):     Results from last 7 days   Lab Units 06/01/22  1814 05/31/22  1054 05/31/22  1045   BLOOD CULTURE   --  No Growth After 5 Days  No Growth After 5 Days  GRAM STAIN RESULT  Rare Polys  No organisms seen  --   --      Results from last 7 days   Lab Units 06/01/22  1814   ANAEROBIC CULTURE  No anaerobes isolated       Imaging: I have personally reviewed pertinent films in PACS  EKG, Pathology, and Other Studies: I have personally reviewed pertinent reports  ** Please Note: Portions of the record may have been created with voice recognition software  Occasional wrong word or "sound a like" substitutions may have occurred due to the inherent limitations of voice recognition software  Read the chart carefully and recognize, using context, where substitutions have occurred   **

## 2022-06-06 NOTE — PLAN OF CARE
Problem: Potential for Falls  Goal: Patient will remain free of falls  Description: INTERVENTIONS:  - Educate patient/family on patient safety including physical limitations  - Instruct patient to call for assistance with activity   - Consult OT/PT to assist with strengthening/mobility   - Keep Call bell within reach  - Keep bed low and locked with side rails adjusted as appropriate  - Keep care items and personal belongings within reach  - Initiate and maintain comfort rounds  - Make Fall Risk Sign visible to staff  - Apply yellow socks and bracelet for high fall risk patients  - Consider moving patient to room near nurses station  6/6/2022 1313 by Rome Martin RN  Outcome: Progressing  6/6/2022 1308 by Rome Martin RN  Outcome: Progressing     Problem: PAIN - ADULT  Goal: Verbalizes/displays adequate comfort level or baseline comfort level  Description: Interventions:  - Encourage patient to monitor pain and request assistance  - Assess pain using appropriate pain scale  - Administer analgesics based on type and severity of pain and evaluate response  - Implement non-pharmacological measures as appropriate and evaluate response  - Consider cultural and social influences on pain and pain management  - Notify physician/advanced practitioner if interventions unsuccessful or patient reports new pain  6/6/2022 1313 by Rome Martin RN  Outcome: Progressing  6/6/2022 1308 by Rome Martin RN  Outcome: Progressing     Problem: INFECTION - ADULT  Goal: Absence or prevention of progression during hospitalization  Description: INTERVENTIONS:  - Assess and monitor for signs and symptoms of infection  - Monitor lab/diagnostic results  - Monitor all insertion sites, i e  indwelling lines, tubes, and drains  - Monitor endotracheal if appropriate and nasal secretions for changes in amount and color  - [place] appropriate cooling/warming therapies per order  - Administer medications as ordered  - Instruct and encourage patient and family to use good hand hygiene technique  - Identify and instruct in appropriate isolation precautions for identified infection/condition  6/6/2022 1313 by Subha Jaeger RN  Outcome: Progressing  6/6/2022 1308 by Subha Jaeger RN  Outcome: Progressing  Goal: Absence of fever/infection during neutropenic period  Description: INTERVENTIONS:  - Monitor WBC    6/6/2022 1313 by Subha Jaeger RN  Outcome: Progressing  6/6/2022 1308 by Subha Jaeger RN  Outcome: Progressing     Problem: SAFETY ADULT  Goal: Patient will remain free of falls  Description: INTERVENTIONS:  - Educate patient/family on patient safety including physical limitations  - Instruct patient to call for assistance with activity   - Consult OT/PT to assist with strengthening/mobility   - Keep Call bell within reach  - Keep bed low and locked with side rails adjusted as appropriate  - Keep care items and personal belongings within reach  - Initiate and maintain comfort rounds  - Make Fall Risk Sign visible to staff  - Apply yellow socks and bracelet for high fall risk patients  - Consider moving patient to room near nurses station  6/6/2022 1313 by Subha Jaeger RN  Outcome: Progressing  6/6/2022 1308 by Subha Jaeger RN  Outcome: Progressing  Goal: Maintain or return to baseline ADL function  Description: INTERVENTIONS:  -  Assess patient's ability to carry out ADLs; assess patient's baseline for ADL function and identify physical deficits which impact ability to perform ADLs (bathing, care of mouth/teeth, toileting, grooming, dressing, etc )  - Assess/evaluate cause of self-care deficits   - Assess range of motion  - Assess patient's mobility; develop plan if impaired  - Assess patient's need for assistive devices and provide as appropriate  - Encourage maximum independence but intervene and supervise when necessary  - Involve family in performance of ADLs  - Assess for home care needs following discharge   - Consider OT consult to assist with ADL evaluation and planning for discharge  - Provide patient education as appropriate  Outcome: Progressing  Goal: Maintains/Returns to pre admission functional level  Description: INTERVENTIONS:  - Perform BMAT or MOVE assessment daily    - Set and communicate daily mobility goal to care team and patient/family/caregiver  - Collaborate with rehabilitation services on mobility goals if consulted  - Out of bed for toileting  - Record patient progress and toleration of activity level   Outcome: Progressing     Problem: MOBILITY - ADULT  Goal: Maintain or return to baseline ADL function  Description: INTERVENTIONS:  -  Assess patient's ability to carry out ADLs; assess patient's baseline for ADL function and identify physical deficits which impact ability to perform ADLs (bathing, care of mouth/teeth, toileting, grooming, dressing, etc )  - Assess/evaluate cause of self-care deficits   - Assess range of motion  - Assess patient's mobility; develop plan if impaired  - Assess patient's need for assistive devices and provide as appropriate  - Encourage maximum independence but intervene and supervise when necessary  - Involve family in performance of ADLs  - Assess for home care needs following discharge   - Consider OT consult to assist with ADL evaluation and planning for discharge  - Provide patient education as appropriate  Outcome: Progressing  Goal: Maintains/Returns to pre admission functional level  Description: INTERVENTIONS:  - Perform BMAT or MOVE assessment daily    - Set and communicate daily mobility goal to care team and patient/family/caregiver     - Collaborate with rehabilitation services on mobility goals if consulted  - Out of bed for toileting  - Record patient progress and toleration of activity level   Outcome: Progressing

## 2022-06-06 NOTE — ASSESSMENT & PLAN NOTE
· VIVIANA on CKD 3 secondary to acute infection  · Did improve with IV fluids, creatinin peaked at 2 05  · Discontinued lisinopril/HCT    ·     Results from last 7 days   Lab Units 06/05/22  0501 06/04/22  0510 06/03/22  0511 06/02/22  0437 06/01/22  0451 05/31/22  1045   BUN mg/dL 42* 49* 46* 44* 54* 49*   CREATININE mg/dL 1 83* 2 05* 1 94* 1 46* 1 99* 2 29*   EGFR ml/min/1 73sq m 39 34 36 51 35 30

## 2022-06-07 LAB
GLUCOSE SERPL-MCNC: 128 MG/DL (ref 65–140)
GLUCOSE SERPL-MCNC: 160 MG/DL (ref 65–140)
GLUCOSE SERPL-MCNC: 165 MG/DL (ref 65–140)

## 2022-06-07 PROCEDURE — 82948 REAGENT STRIP/BLOOD GLUCOSE: CPT

## 2022-06-07 PROCEDURE — 99232 SBSQ HOSP IP/OBS MODERATE 35: CPT | Performed by: INTERNAL MEDICINE

## 2022-06-07 PROCEDURE — 99233 SBSQ HOSP IP/OBS HIGH 50: CPT | Performed by: INTERNAL MEDICINE

## 2022-06-07 RX ADMIN — SODIUM CHLORIDE, SODIUM LACTATE, POTASSIUM CHLORIDE, AND CALCIUM CHLORIDE 100 ML/HR: .6; .31; .03; .02 INJECTION, SOLUTION INTRAVENOUS at 02:22

## 2022-06-07 RX ADMIN — PIPERACILLIN AND TAZOBACTAM 3.38 G: 3; .375 INJECTION, POWDER, LYOPHILIZED, FOR SOLUTION INTRAVENOUS at 02:21

## 2022-06-07 RX ADMIN — Medication 400 UNITS: at 08:43

## 2022-06-07 RX ADMIN — ZINC SULFATE 220 MG (50 MG) CAPSULE 220 MG: CAPSULE at 08:42

## 2022-06-07 RX ADMIN — LOTEPREDNOL ETABONATE 1 DROP: 10 SUSPENSION TOPICAL at 19:47

## 2022-06-07 RX ADMIN — CARVEDILOL 25 MG: 25 TABLET, FILM COATED ORAL at 07:28

## 2022-06-07 RX ADMIN — CARVEDILOL 25 MG: 25 TABLET, FILM COATED ORAL at 16:49

## 2022-06-07 RX ADMIN — ACETAMINOPHEN 650 MG: 325 TABLET, FILM COATED ORAL at 09:34

## 2022-06-07 RX ADMIN — HYDROMORPHONE HYDROCHLORIDE 0.5 MG: 1 INJECTION, SOLUTION INTRAMUSCULAR; INTRAVENOUS; SUBCUTANEOUS at 07:28

## 2022-06-07 RX ADMIN — PIPERACILLIN AND TAZOBACTAM 3.38 G: 3; .375 INJECTION, POWDER, LYOPHILIZED, FOR SOLUTION INTRAVENOUS at 19:51

## 2022-06-07 RX ADMIN — GLYCERIN, HYPROMELLOSE, POLYETHYLENE GLYCOL 1 DROP: .2; .2; 1 LIQUID OPHTHALMIC at 07:36

## 2022-06-07 RX ADMIN — TRAMADOL HYDROCHLORIDE 50 MG: 50 TABLET, COATED ORAL at 19:46

## 2022-06-07 RX ADMIN — OXYCODONE HYDROCHLORIDE 10 MG: 10 TABLET ORAL at 05:28

## 2022-06-07 RX ADMIN — OXYCODONE HYDROCHLORIDE AND ACETAMINOPHEN 1000 MG: 500 TABLET ORAL at 19:46

## 2022-06-07 RX ADMIN — OXYCODONE HYDROCHLORIDE 10 MG: 10 TABLET ORAL at 21:54

## 2022-06-07 RX ADMIN — HYDROMORPHONE HYDROCHLORIDE 0.5 MG: 1 INJECTION, SOLUTION INTRAMUSCULAR; INTRAVENOUS; SUBCUTANEOUS at 02:21

## 2022-06-07 RX ADMIN — PIPERACILLIN AND TAZOBACTAM 3.38 G: 3; .375 INJECTION, POWDER, LYOPHILIZED, FOR SOLUTION INTRAVENOUS at 13:59

## 2022-06-07 RX ADMIN — OXYCODONE HYDROCHLORIDE AND ACETAMINOPHEN 1000 MG: 500 TABLET ORAL at 08:43

## 2022-06-07 RX ADMIN — LOTEPREDNOL ETABONATE 1 DROP: 10 SUSPENSION TOPICAL at 08:43

## 2022-06-07 RX ADMIN — PIPERACILLIN AND TAZOBACTAM 3.38 G: 3; .375 INJECTION, POWDER, LYOPHILIZED, FOR SOLUTION INTRAVENOUS at 07:28

## 2022-06-07 NOTE — PROGRESS NOTES
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   Patient is status post left 5th ray resection, for surgical cure  Operative culture with growth of Pseudomonas again  Patient is status post repeat I&D yesterday, without deep purulence at with normal appearing bone  Continue IV Zosyn for now  Wound care per Podiatry  Transition p o  Levaquin at discharge  Treat x7 days post I&D      2  CKD stage 3  Creatinine stable  Antibiotic dosages adjusted accordingly  Monitor creatinine      3  DM, type 2  Elevated blood sugar is risk for wound nonhealing and infection above  Management per primary service      4  Morbid obesity      Discussed with patient in detail regarding the above plan      Antibiotics:  Zosyn  POD # 6/     Subjective:  Patient's foot pain a little worse today after I&D  Temperature stays down   No chills  He is tolerating antibiotic well   No nausea, vomiting or diarrhea      Objective:  Vitals:  Temp:  [98 4 °F (36 9 °C)-98 5 °F (36 9 °C)] 98 5 °F (36 9 °C)  HR:  [77-92] 86  Resp:  [12-18] 18  BP: (123-142)/() 142/80  SpO2:  [90 %-96 %] 94 %  Temp (24hrs), Av 5 °F (36 9 °C), Min:98 4 °F (36 9 °C), Max:98 5 °F (36 9 °C)  Current: Temperature: 98 5 °F (36 9 °C)    Physical Exam:     General: Awake, alert, cooperative, no distress  Neck:  Supple  No mass  No lymphadenopathy  Lungs: Expansion symmetric, no rales, no wheezing, respirations unlabored  Heart:  Regular rate and rhythm, S1 and S2 normal, no murmur  Abdomen: Soft, nondistended, non-tender, bowel sounds active all four quadrants, no masses, no organomegaly  Extremities: Stable edema  Left foot with dressing in place  Dressing is dry  No erythema/warmth beyond dressing  Mild to moderate tenderness     Skin:  No rash    Neuro: Moves all extremities  Invasive Devices  Report    Peripheral Intravenous Line  Duration           Peripheral IV 06/04/22 Dorsal (posterior); Right Hand 3 days          Drain  Duration           Open Drain Left;Lateral Foot <1 day                Labs studies:   I have personally reviewed pertinent labs  Results from last 7 days   Lab Units 06/05/22  0501 06/04/22  0510 06/03/22  0511   POTASSIUM mmol/L 4 7 4 7 4 8   CHLORIDE mmol/L 102 102 104   CO2 mmol/L 22 23 23   BUN mg/dL 42* 49* 46*   CREATININE mg/dL 1 83* 2 05* 1 94*   EGFR ml/min/1 73sq m 39 34 36   CALCIUM mg/dL 8 7 8 5 8 3   AST U/L 13 12 10   ALT U/L 19 22 20   ALK PHOS U/L 75 66 60     Results from last 7 days   Lab Units 06/05/22  0501 06/04/22  0510 06/03/22  0511   WBC Thousand/uL 6 25 6 07 5 70   HEMOGLOBIN g/dL 10 7* 10 6* 10 9*   PLATELETS Thousands/uL 232 210 188     Results from last 7 days   Lab Units 06/01/22  1814 05/31/22  1054 05/31/22  1045   BLOOD CULTURE   --  No Growth After 5 Days  No Growth After 5 Days  GRAM STAIN RESULT  Rare Polys  No organisms seen  --   --        Imaging Studies:   I have personally reviewed pertinent imaging study reports and images in PACS  EKG, Pathology, and Other Studies:   I have personally reviewed pertinent reports

## 2022-06-07 NOTE — DISCHARGE INSTRUCTIONS
Discharge Instructions - Podiatry    Weight Bearing Status: Non-weight bearing left foot                   Pain: Continue analgesics as directed    Follow-up appointment instructions: Please make an appointment within one week of discharge with Dr Lina Powell  Contact sooner if any increase in pain, or signs of infection occur    Wound Care: Leave dressings clean, dry, and intact between professional dressing changes    Nursing Instructions: Please apply Xeroform  Then cover with Gauze and secure with Kerlix and tape  Please change dressing 3x a week

## 2022-06-07 NOTE — ASSESSMENT & PLAN NOTE
Lab Results   Component Value Date    HGBA1C 6 6 (H) 04/26/2022     Recent Labs     06/06/22  2105 06/07/22  0711 06/07/22  1105 06/07/22  1608   POCGLU 115 128 165* 160*     · Continue sliding scale insulin and basal bolus protocol

## 2022-06-07 NOTE — ASSESSMENT & PLAN NOTE
49-year-old gentleman with diabetes mellitus presents with recurrent osteomyelitis of left foot  1  Diabetic ulceration left lateral 5th MTPJ with underlying osteomyelitis  - S/p R partial 5th ray amputation, 6/1/2022        - OR 6/6/22 for left foot washout and vac application     · Patient went to OR today,06/06/22, for  left foot washout and vac application with Dr Sofia Ward          Wound cx with Pseudomonas- sensitivities reviewed- transition fluoroquinolone at discharge

## 2022-06-07 NOTE — PROGRESS NOTES
Podiatry - Progress Note  Patient: Claus Valadez 59 y o  male   MRN: [id number]  PCP: Dina Stark MD  Unit/Bed#: E5 -22 Encounter: [id number]  Date Of Visit: 22    ASSESSMENT:    Claus Valadez is a 59 y o  male with:    1  Diabetic ulceration left lateral 5th MTPJ with underlying osteomyelitis  - S/p R partial 5th ray amputation (DOS 22)  -S/p R foot debridement and closure (DOS 22)  2  Dorsal 2nd digit eschar, left foot  3  T2DM  4  VIVIANA on CKD2  5  Lumbar radiculopathy    PLAN:    · Post-surgical dressings were changed today  Erythema appears most likely from PAD and small vessel disease   · Vitals signs stable  Patient afebrile with no leukocytosis  No erythema, edema, or lymphangitis noted  · LEADs reviewed showing adequate healing potential to b/l LE   · Pain is well controlled  Continue current pain management regimen  · Continue non-weight bearing to LLE extremity WITH ELEVATION OF THE LEFT FOOT ABOVE THE LEVEL OF THE HEART while non-ambulatory  · Continue abx per infectious disease  · Rest of medical care per primary team        SUBJECTIVE:     The patient was seen, evaluated, and assessed at bedside today  The patient was awake, alert, and in no acute distress  The patient reports minimal pain at this time except with dressing changes  Pain is well controlled with current pain management regimen  Patient reports normal appetite and using the restroom postoperatively  Patient denies N/V/F/chills/SOB/CP  OBJECTIVE:     Vitals:   /98   Pulse 77   Temp 98 4 °F (36 9 °C)   Resp 17   Ht 6' (1 829 m)   Wt 120 kg (264 lb 8 8 oz)   SpO2 91%   BMI 35 88 kg/m²     Temp (24hrs), Av 6 °F (37 °C), Min:98 1 °F (36 7 °C), Max:99 4 °F (37 4 °C)      Physical Exam:     General:  Alert, cooperative, and in no distress  Lower extremity exam:  Cardiovascular status at baseline  Neurological status at baseline  Musculoskeletal status at baseline      Blanchable erythema greater than 2 cm located on dorsal foot  This reduces with dependency  Slight warmth to the touch of the dorsal foot  No bogginess noted or fluctuance  Minimal sanguinous drainage on dressing  Incision well coapted with sutures intact  Clinical Images 06/08/22: Additional Data:     Labs:    Results from last 7 days   Lab Units 06/08/22  0556 06/03/22  0511 06/02/22  0437   WBC Thousand/uL 4 47   < > 5 74   HEMOGLOBIN g/dL 10 3*   < > 12 6   HEMATOCRIT % 31 7*   < > 38 5   PLATELETS Thousands/uL 237   < > 205   NEUTROS PCT %  --   --  70   LYMPHS PCT %  --   --  13*   LYMPHO PCT % 27  --   --    MONOS PCT %  --   --  13*   MONO PCT % 9  --   --    EOS PCT % 5  --  3    < > = values in this interval not displayed  Results from last 7 days   Lab Units 06/08/22  0556 06/05/22  0501   POTASSIUM mmol/L 4 4 4 7   CHLORIDE mmol/L 104 102   CO2 mmol/L 23 22   BUN mg/dL 25 42*   CREATININE mg/dL 1 35* 1 83*   CALCIUM mg/dL 8 7 8 7   ALK PHOS U/L  --  75   ALT U/L  --  19   AST U/L  --  13           * I Have Reviewed All Lab Data Listed Above  Recent Cultures (last 7 days):     Results from last 7 days   Lab Units 06/01/22  1814   GRAM STAIN RESULT  Rare Polys  No organisms seen     Results from last 7 days   Lab Units 06/01/22  1814   ANAEROBIC CULTURE  No anaerobes isolated       Imaging: I have personally reviewed pertinent films in PACS  EKG, Pathology, and Other Studies: I have personally reviewed pertinent reports  ** Please Note: Portions of the record may have been created with voice recognition software  Occasional wrong word or "sound a like" substitutions may have occurred due to the inherent limitations of voice recognition software  Read the chart carefully and recognize, using context, where substitutions have occurred   **

## 2022-06-07 NOTE — ASSESSMENT & PLAN NOTE
· VIVIANA on CKD 3 secondary to acute infection  · Did improve with IV fluids, creatinin peaked at 2 05  · Discontinued lisinopril/HCT    ·     Results from last 7 days   Lab Units 06/05/22  0501 06/04/22  0510 06/03/22  0511 06/02/22  0437 06/01/22  0451   BUN mg/dL 42* 49* 46* 44* 54*   CREATININE mg/dL 1 83* 2 05* 1 94* 1 46* 1 99*   EGFR ml/min/1 73sq m 39 34 36 51 35

## 2022-06-07 NOTE — PLAN OF CARE
Problem: Potential for Falls  Goal: Patient will remain free of falls  Description: INTERVENTIONS:  - Educate patient/family on patient safety including physical limitations  - Instruct patient to call for assistance with activity   - Consult OT/PT to assist with strengthening/mobility   - Keep Call bell within reach  - Keep bed low and locked with side rails adjusted as appropriate  - Keep care items and personal belongings within reach  - Initiate and maintain comfort rounds  - Make Fall Risk Sign visible to staff  - Apply yellow socks and bracelet for high fall risk patients  - Consider moving patient to room near nurses station  Outcome: Progressing     Problem: PAIN - ADULT  Goal: Verbalizes/displays adequate comfort level or baseline comfort level  Description: Interventions:  - Encourage patient to monitor pain and request assistance  - Assess pain using appropriate pain scale  - Administer analgesics based on type and severity of pain and evaluate response  - Implement non-pharmacological measures as appropriate and evaluate response  - Consider cultural and social influences on pain and pain management  - Notify physician/advanced practitioner if interventions unsuccessful or patient reports new pain  Outcome: Progressing     Problem: SAFETY ADULT  Goal: Patient will remain free of falls  Description: INTERVENTIONS:  - Educate patient/family on patient safety including physical limitations  - Instruct patient to call for assistance with activity   - Consult OT/PT to assist with strengthening/mobility   - Keep Call bell within reach  - Keep bed low and locked with side rails adjusted as appropriate  - Keep care items and personal belongings within reach  - Initiate and maintain comfort rounds  - Make Fall Risk Sign visible to staff  - Apply yellow socks and bracelet for high fall risk patients  - Consider moving patient to room near nurses station  Outcome: Progressing  Goal: Maintain or return to baseline ADL function  Description: INTERVENTIONS:  -  Assess patient's ability to carry out ADLs; assess patient's baseline for ADL function and identify physical deficits which impact ability to perform ADLs (bathing, care of mouth/teeth, toileting, grooming, dressing, etc )  - Assess/evaluate cause of self-care deficits   - Assess range of motion  - Assess patient's mobility; develop plan if impaired  - Assess patient's need for assistive devices and provide as appropriate  - Encourage maximum independence but intervene and supervise when necessary  - Involve family in performance of ADLs  - Assess for home care needs following discharge   - Consider OT consult to assist with ADL evaluation and planning for discharge  - Provide patient education as appropriate  Outcome: Progressing     Problem: DISCHARGE PLANNING  Goal: Discharge to home or other facility with appropriate resources  Description: INTERVENTIONS:  - Identify barriers to discharge w/patient and caregiver  - Arrange for needed discharge resources and transportation as appropriate  - Identify discharge learning needs (meds, wound care, etc )  - Arrange for interpretive services to assist at discharge as needed  - Refer to Case Management Department for coordinating discharge planning if the patient needs post-hospital services based on physician/advanced practitioner order or complex needs related to functional status, cognitive ability, or social support system  Outcome: Progressing     Problem: MOBILITY - ADULT  Goal: Maintain or return to baseline ADL function  Description: INTERVENTIONS:  -  Assess patient's ability to carry out ADLs; assess patient's baseline for ADL function and identify physical deficits which impact ability to perform ADLs (bathing, care of mouth/teeth, toileting, grooming, dressing, etc )  - Assess/evaluate cause of self-care deficits   - Assess range of motion  - Assess patient's mobility; develop plan if impaired  - Assess patient's need for assistive devices and provide as appropriate  - Encourage maximum independence but intervene and supervise when necessary  - Involve family in performance of ADLs  - Assess for home care needs following discharge   - Consider OT consult to assist with ADL evaluation and planning for discharge  - Provide patient education as appropriate  Outcome: Progressing

## 2022-06-07 NOTE — PROGRESS NOTES
[address] [place] [address]  Progress Note Kika Allen 1965, 59 y o  male MRN: [id number]  Unit/Bed#: E5 -01 Encounter: [id number]  Primary Care Provider: Azael Sanford MD   Date and time admitted to hospital: 5/31/2022  9:47 AM    * Diabetic ulcer of left foot [facility])  Assessment & Plan  68-year-old gentleman with diabetes mellitus presents with recurrent osteomyelitis of left foot  1  Diabetic ulceration left lateral 5th MTPJ with underlying osteomyelitis  - S/p R partial 5th ray amputation, 6/1/2022        - OR 6/6/22 for left foot washout and vac application     · Patient went to OR today,06/06/22, for  left foot washout and vac application with Dr Nikki Leiva  Wound cx with Pseudomonas- sensitivities reviewed- transition fluoroquinolone at discharge        Acute kidney injury superimposed on chronic kidney disease ([facility] [address] )  Assessment & Plan  · VIVIANA on CKD 3 secondary to acute infection  · Did improve with IV fluids, creatinin peaked at 2 05  · Discontinued lisinopril/HCT  ·     Results from last 7 days   Lab Units 06/05/22  0501 06/04/22  0510 06/03/22  0511 06/02/22  0437 06/01/22  0451   BUN mg/dL 42* 49* 46* 44* 54*   CREATININE mg/dL 1 83* 2 05* 1 94* 1 46* 1 99*   EGFR ml/min/1 73sq m 39 34 39 51 28       H/O eye surgery  Assessment & Plan  · Follows with Dr Marissa Omalley ([phone])  · Was supposed to have sutures removed but patient has not followed up yet  · Continue eyedrops but advised need for close follow-up    Obesity  Assessment & Plan  · Body mass index is 35 88 kg/m²      Diabetes mellitus [facility])  Assessment & Plan  Lab Results   Component Value Date    HGBA1C 6 6 (H) 04/26/2022     Recent Labs     06/06/22  2105 06/07/22  0711 06/07/22  1105 06/07/22  1608   POCGLU 115 128 165* 160*     · Continue sliding scale insulin and basal bolus protocol    Essential hypertension  Assessment & Plan  · Continue carvedilol but discontinued lisinopril/HCT given worsening renal function  · Monitor renal function, especially in the setting of [address]  [facility] Internal Medicine Progress Note  Patient: Zak Casey 59 y o  male   MRN: [id number]  PCP: Aguilar Rodriguez MD  Unit/Bed#: E5 -47 Encounter: [id number]  Date Of Visit: 22    Assessment:    Principal Problem:    Diabetic ulcer of left foot ([facility] [address] )  Active Problems:    Essential hypertension    Diabetes mellitus ([facility] )    Obesity    H/O eye surgery    Acute kidney injury superimposed on chronic kidney disease ([facility] )      Plan:    · Continue IV antibiotics  · Discharge planning       VTE Pharmacologic Prophylaxis:   Pharmacologic: Heparin  Mechanical VTE Prophylaxis in Place: Yes    Patient Centered Rounds: I have performed bedside rounds with nursing staff today  Discussions with Specialists or Other Care Team Provider:  Case management    Education and Discussions with Family / Patient:  Patient family    Time Spent for Care: 45 minutes  More than 50% of total time spent on counseling and coordination of care as described above  Current Length of Stay: 7 day(s)    Current Patient Status: Inpatient   Certification Statement: The patient will continue to require additional inpatient hospital stay due to IV antibiotics, PT eval, discharge planning    Discharge Plan / Estimated Discharge Date: To be determined    Code Status: Level 3 - DNAR and DNI      Subjective:   Seen and examined, no acute complaints  Pain is controlled  A complete and comprehensive 14 point organ system review has been performed and all other systems are negative other than stated above  Objective:     Vitals:   Temp (24hrs), Av 6 °F (37 °C), Min:98 5 °F (36 9 °C), Max:98 6 °F (37 °C)    Temp:  [98 5 °F (36 9 °C)-98 6 °F (37 °C)] 98 6 °F (37 °C)  HR:  [77-92] 84  Resp:  [12-18] 18  BP: (123-181)/() 181/105  SpO2:  [91 %-96 %] 95 %  Body mass index is 35 88 kg/m²  Input and Output Summary (last 24 hours):        Intake/Output Summary (Last 24 hours) at 6/7/2022 1839  Last data filed at 6/6/2022 1924  Gross per 24 hour   Intake 200 ml   Output --   Net 200 ml       Physical Exam:     General: well appearing, no acute distress  HEENT: atraumatic, PERRLA, moist mucosa, normal pharynx, normal tonsils and adenoids, normal tongue, no fluid in sinuses  Neck: Trachea midline, no carotid bruit, no masses  Respiratory: normal chest wall expansion, CTA B, no r/r/w, no rubs  Cardiovascular: RRR, no m/r/g, Normal S1 and S2  Abdomen: Soft, non-tender, non-distended, normal bowel sounds in all quadrants, no hepatosplenomegaly, no tympany  Rectal: deferred  Musculoskeletal: normal ROM in upper and lower extremities  Integumentary:  Dressing clear dry and intact   Heme/Lymph: no lymphadenopathy, no bruises  Neurological: Cranial Nerves II-XII grossly intact, no tics, normal sensation to pressure and light touch  Psychiatric: cooperative with normal mood, affect, and cognition      Additional Data:     Labs:    Results from last 7 days   Lab Units 06/05/22  0501 06/03/22  0511 06/02/22  0437   WBC Thousand/uL 6 25   < > 5 74   HEMOGLOBIN g/dL 10 7*   < > 12 6   HEMATOCRIT % 33 3*   < > 38 5   PLATELETS Thousands/uL 232   < > 205   NEUTROS PCT %  --   --  70   LYMPHS PCT %  --   --  13*   MONOS PCT %  --   --  13*   EOS PCT %  --   --  3    < > = values in this interval not displayed  Results from last 7 days   Lab Units 06/05/22  0501   POTASSIUM mmol/L 4 7   CHLORIDE mmol/L 102   CO2 mmol/L 22   BUN mg/dL 42*   CREATININE mg/dL 1 83*   CALCIUM mg/dL 8 7   ALK PHOS U/L 75   ALT U/L 19   AST U/L 13           * I Have Reviewed All Lab Data Listed Above  * Additional Pertinent Lab Tests Reviewed:  [facility] Admission Reviewed    Imaging:    Imaging Reports Reviewed Today Include:  No new imaging  Imaging Personally Reviewed by Myself Includes:  No new imaging    Recent Cultures (last 7 days):     Results from last 7 days   Lab Units 06/01/22  1814   GRAM STAIN RESULT  Rare Polys  No organisms seen       Last 24 Hours Medication List:   Current Facility-Administered Medications   Medication Dose Route Frequency Provider Last Rate    acetaminophen  650 mg Oral Q6H PRN Stoney Locus, DPM      ALPRAZolam  1 mg Oral Once in imaging Stoney Locus, DPM      vitamin C  1,000 mg Oral BID Stoney Locus, DPM      carvedilol  25 mg Oral BID With Meals Stoney Locus, DPM      cyclobenzaprine  5 mg Oral BID PRN Stoney Locus, DPM      glycerin-hypromellose-  1 drop Both Eyes Q4H PRN Stoney Locus, DPM      heparin (porcine)  5,000 Units Subcutaneous [facility] Stoney Locus, [place]      HYDROmorphone  0 5 mg Intravenous Q4H PRN Stoney Locus, DPM      insulin lispro  2-12 Units Subcutaneous TID AC Stoney Locus, DPM      Loteprednol Etabonate  1 drop Ophthalmic BID Stoney Locus, DPM      oxyCODONE  10 mg Oral Q4H PRN Stoney Locus, DPM      piperacillin-tazobactam  3 375 g Intravenous Q6H Stoney Locus, DPM 3 375 g (06/07/22 1359)    traMADol  50 mg Oral BID PRN Stoney Locus, DPM      vitamin E (tocopherol)  400 Units Oral Daily Stoney Locus, DPM      zinc sulfate  220 mg Oral Daily Stoney Locus, DPM          Today, Patient Was Seen By: Bernadette Rico DO    ** Please Note: This note was completed in part utilizing [employer] Direct Software  Grammatical errors, random word insertions, spelling mistakes, and incomplete sentences may be an occasional consequence of this system secondary to software limitations, ambient noise, and hardware issues  If you have any questions or concerns about the content, text, or information contained within the body of this dictation, please contact the provider for clarification   **

## 2022-06-07 NOTE — ASSESSMENT & PLAN NOTE
· Follows with Dr Gema Licea ([phone])  · Was supposed to have sutures removed but patient has not followed up yet    · Continue eyedrops but advised need for close follow-up

## 2022-06-08 LAB
ANION GAP SERPL CALCULATED.3IONS-SCNC: 9 MMOL/L (ref 4–13)
BASOPHILS # BLD MANUAL: 0 THOUSAND/UL (ref 0–0.1)
BASOPHILS NFR MAR MANUAL: 0 % (ref 0–1)
BUN SERPL-MCNC: 25 MG/DL (ref 5–25)
CALCIUM SERPL-MCNC: 8.7 MG/DL (ref 8.3–10.1)
CHLORIDE SERPL-SCNC: 104 MMOL/L (ref 100–108)
CO2 SERPL-SCNC: 23 MMOL/L (ref 21–32)
CREAT SERPL-MCNC: 1.35 MG/DL (ref 0.6–1.3)
EOSINOPHIL # BLD MANUAL: 0.22 THOUSAND/UL (ref 0–0.4)
EOSINOPHIL NFR BLD MANUAL: 5 % (ref 0–6)
ERYTHROCYTE [DISTWIDTH] IN BLOOD BY AUTOMATED COUNT: 12.6 % (ref 11.6–15.1)
GFR SERPL CREATININE-BSD FRML MDRD: 57 ML/MIN/1.73SQ M
GLUCOSE SERPL-MCNC: 106 MG/DL (ref 65–140)
GLUCOSE SERPL-MCNC: 106 MG/DL (ref 65–140)
GLUCOSE SERPL-MCNC: 167 MG/DL (ref 65–140)
GLUCOSE SERPL-MCNC: 175 MG/DL (ref 65–140)
GLUCOSE SERPL-MCNC: 176 MG/DL (ref 65–140)
HCT VFR BLD AUTO: 31.7 % (ref 36.5–49.3)
HGB BLD-MCNC: 10.3 G/DL (ref 12–17)
LG PLATELETS BLD QL SMEAR: PRESENT
LYMPHOCYTES # BLD AUTO: 1.21 THOUSAND/UL (ref 0.6–4.47)
LYMPHOCYTES # BLD AUTO: 27 % (ref 14–44)
MCH RBC QN AUTO: 29.7 PG (ref 26.8–34.3)
MCHC RBC AUTO-ENTMCNC: 32.5 G/DL (ref 31.4–37.4)
MCV RBC AUTO: 91 FL (ref 82–98)
MONOCYTES # BLD AUTO: 0.4 THOUSAND/UL (ref 0–1.22)
MONOCYTES NFR BLD: 9 % (ref 4–12)
NEUTROPHILS # BLD MANUAL: 2.64 THOUSAND/UL (ref 1.85–7.62)
NEUTS SEG NFR BLD AUTO: 59 % (ref 43–75)
OVALOCYTES BLD QL SMEAR: PRESENT
PLATELET # BLD AUTO: 237 THOUSANDS/UL (ref 149–390)
PLATELET BLD QL SMEAR: ADEQUATE
PMV BLD AUTO: 9.6 FL (ref 8.9–12.7)
POTASSIUM SERPL-SCNC: 4.4 MMOL/L (ref 3.5–5.3)
RBC # BLD AUTO: 3.47 MILLION/UL (ref 3.88–5.62)
SODIUM SERPL-SCNC: 136 MMOL/L (ref 136–145)
WBC # BLD AUTO: 4.47 THOUSAND/UL (ref 4.31–10.16)

## 2022-06-08 PROCEDURE — 99232 SBSQ HOSP IP/OBS MODERATE 35: CPT | Performed by: INTERNAL MEDICINE

## 2022-06-08 PROCEDURE — 82948 REAGENT STRIP/BLOOD GLUCOSE: CPT

## 2022-06-08 PROCEDURE — 85027 COMPLETE CBC AUTOMATED: CPT | Performed by: INTERNAL MEDICINE

## 2022-06-08 PROCEDURE — 85007 BL SMEAR W/DIFF WBC COUNT: CPT | Performed by: INTERNAL MEDICINE

## 2022-06-08 PROCEDURE — 80048 BASIC METABOLIC PNL TOTAL CA: CPT | Performed by: INTERNAL MEDICINE

## 2022-06-08 PROCEDURE — 97166 OT EVAL MOD COMPLEX 45 MIN: CPT

## 2022-06-08 PROCEDURE — 99233 SBSQ HOSP IP/OBS HIGH 50: CPT | Performed by: INTERNAL MEDICINE

## 2022-06-08 RX ORDER — ROPINIROLE 0.25 MG/1
0.5 TABLET, FILM COATED ORAL
Status: DISCONTINUED | OUTPATIENT
Start: 2022-06-08 | End: 2022-06-09 | Stop reason: HOSPADM

## 2022-06-08 RX ORDER — ROPINIROLE 1 MG/1
0.5 TABLET, FILM COATED ORAL
Status: DISCONTINUED | OUTPATIENT
Start: 2022-06-08 | End: 2022-06-08 | Stop reason: SDUPTHER

## 2022-06-08 RX ADMIN — PIPERACILLIN AND TAZOBACTAM 3.38 G: 3; .375 INJECTION, POWDER, LYOPHILIZED, FOR SOLUTION INTRAVENOUS at 20:13

## 2022-06-08 RX ADMIN — OXYCODONE HYDROCHLORIDE AND ACETAMINOPHEN 1000 MG: 500 TABLET ORAL at 08:32

## 2022-06-08 RX ADMIN — CARVEDILOL 25 MG: 25 TABLET, FILM COATED ORAL at 16:39

## 2022-06-08 RX ADMIN — ROPINIROLE 0.5 MG: 0.25 TABLET, FILM COATED ORAL at 22:08

## 2022-06-08 RX ADMIN — GLYCERIN, HYPROMELLOSE, POLYETHYLENE GLYCOL 1 DROP: .2; .2; 1 LIQUID OPHTHALMIC at 13:39

## 2022-06-08 RX ADMIN — PIPERACILLIN AND TAZOBACTAM 3.38 G: 3; .375 INJECTION, POWDER, LYOPHILIZED, FOR SOLUTION INTRAVENOUS at 07:25

## 2022-06-08 RX ADMIN — ZINC SULFATE 220 MG (50 MG) CAPSULE 220 MG: CAPSULE at 08:32

## 2022-06-08 RX ADMIN — CARVEDILOL 25 MG: 25 TABLET, FILM COATED ORAL at 07:25

## 2022-06-08 RX ADMIN — LOTEPREDNOL ETABONATE 1 DROP: 10 SUSPENSION TOPICAL at 20:13

## 2022-06-08 RX ADMIN — PIPERACILLIN AND TAZOBACTAM 3.38 G: 3; .375 INJECTION, POWDER, LYOPHILIZED, FOR SOLUTION INTRAVENOUS at 03:15

## 2022-06-08 RX ADMIN — PIPERACILLIN AND TAZOBACTAM 3.38 G: 3; .375 INJECTION, POWDER, LYOPHILIZED, FOR SOLUTION INTRAVENOUS at 13:39

## 2022-06-08 RX ADMIN — LOTEPREDNOL ETABONATE 1 DROP: 10 SUSPENSION TOPICAL at 08:32

## 2022-06-08 RX ADMIN — OXYCODONE HYDROCHLORIDE 10 MG: 10 TABLET ORAL at 12:15

## 2022-06-08 RX ADMIN — OXYCODONE HYDROCHLORIDE AND ACETAMINOPHEN 1000 MG: 500 TABLET ORAL at 20:13

## 2022-06-08 RX ADMIN — Medication 400 UNITS: at 08:32

## 2022-06-08 NOTE — PLAN OF CARE
Problem: Potential for Falls  Goal: Patient will remain free of falls  Description: INTERVENTIONS:  - Educate patient/family on patient safety including physical limitations  - Instruct patient to call for assistance with activity   - Consult OT/PT to assist with strengthening/mobility   - Keep Call bell within reach  - Keep bed low and locked with side rails adjusted as appropriate  - Keep care items and personal belongings within reach  - Initiate and maintain comfort rounds  - Make Fall Risk Sign visible to staff  - Apply yellow socks and bracelet for high fall risk patients  - Consider moving patient to room near nurses station  Outcome: Progressing     Problem: PAIN - ADULT  Goal: Verbalizes/displays adequate comfort level or baseline comfort level  Description: Interventions:  - Encourage patient to monitor pain and request assistance  - Assess pain using appropriate pain scale  - Administer analgesics based on type and severity of pain and evaluate response  - Implement non-pharmacological measures as appropriate and evaluate response  - Consider cultural and social influences on pain and pain management  - Notify physician/advanced practitioner if interventions unsuccessful or patient reports new pain  Outcome: Progressing     Problem: INFECTION - ADULT  Goal: Absence or prevention of progression during hospitalization  Description: INTERVENTIONS:  - Assess and monitor for signs and symptoms of infection  - Monitor lab/diagnostic results  - Monitor all insertion sites, i e  indwelling lines, tubes, and drains  - Monitor endotracheal if appropriate and nasal secretions for changes in amount and color  - [place] appropriate cooling/warming therapies per order  - Administer medications as ordered  - Instruct and encourage patient and family to use good hand hygiene technique  - Identify and instruct in appropriate isolation precautions for identified infection/condition  Outcome: Progressing     Problem: SAFETY ADULT  Goal: Patient will remain free of falls  Description: INTERVENTIONS:  - Educate patient/family on patient safety including physical limitations  - Instruct patient to call for assistance with activity   - Consult OT/PT to assist with strengthening/mobility   - Keep Call bell within reach  - Keep bed low and locked with side rails adjusted as appropriate  - Keep care items and personal belongings within reach  - Initiate and maintain comfort rounds  - Make Fall Risk Sign visible to staff  - Apply yellow socks and bracelet for high fall risk patients  - Consider moving patient to room near nurses station  Outcome: Progressing  Goal: Maintain or return to baseline ADL function  Description: INTERVENTIONS:  -  Assess patient's ability to carry out ADLs; assess patient's baseline for ADL function and identify physical deficits which impact ability to perform ADLs (bathing, care of mouth/teeth, toileting, grooming, dressing, etc )  - Assess/evaluate cause of self-care deficits   - Assess range of motion  - Assess patient's mobility; develop plan if impaired  - Assess patient's need for assistive devices and provide as appropriate  - Encourage maximum independence but intervene and supervise when necessary  - Involve family in performance of ADLs  - Assess for home care needs following discharge   - Consider OT consult to assist with ADL evaluation and planning for discharge  - Provide patient education as appropriate  Outcome: Progressing  Goal: Maintains/Returns to pre admission functional level  Description: INTERVENTIONS:  - Perform BMAT or MOVE assessment daily    - Set and communicate daily mobility goal to care team and patient/family/caregiver     - Collaborate with rehabilitation services on mobility goals if consulted  - Out of bed for toileting  - Record patient progress and toleration of activity level   Outcome: Progressing     Problem: DISCHARGE PLANNING  Goal: Discharge to home or other facility with appropriate resources  Description: INTERVENTIONS:  - Identify barriers to discharge w/patient and caregiver  - Arrange for needed discharge resources and transportation as appropriate  - Identify discharge learning needs (meds, wound care, etc )  - Arrange for interpretive services to assist at discharge as needed  - Refer to Case Management Department for coordinating discharge planning if the patient needs post-hospital services based on physician/advanced practitioner order or complex needs related to functional status, cognitive ability, or social support system  Outcome: Progressing     Problem: Knowledge Deficit  Goal: Patient/family/caregiver demonstrates understanding of disease process, treatment plan, medications, and discharge instructions  Description: Complete learning assessment and assess knowledge base    Interventions:  - Provide teaching at level of understanding  - Provide teaching via preferred learning methods  Outcome: Progressing     Problem: MOBILITY - ADULT  Goal: Maintain or return to baseline ADL function  Description: INTERVENTIONS:  -  Assess patient's ability to carry out ADLs; assess patient's baseline for ADL function and identify physical deficits which impact ability to perform ADLs (bathing, care of mouth/teeth, toileting, grooming, dressing, etc )  - Assess/evaluate cause of self-care deficits   - Assess range of motion  - Assess patient's mobility; develop plan if impaired  - Assess patient's need for assistive devices and provide as appropriate  - Encourage maximum independence but intervene and supervise when necessary  - Involve family in performance of ADLs  - Assess for home care needs following discharge   - Consider OT consult to assist with ADL evaluation and planning for discharge  - Provide patient education as appropriate  Outcome: Progressing  Goal: Maintains/Returns to pre admission functional level  Description: INTERVENTIONS:  - Perform BMAT or MOVE assessment daily    - Set and communicate daily mobility goal to care team and patient/family/caregiver     - Collaborate with rehabilitation services on mobility goals if consulted  - Out of bed for toileting  - Record patient progress and toleration of activity level   Outcome: Progressing

## 2022-06-08 NOTE — PLAN OF CARE
Problem: Potential for Falls  Goal: Patient will remain free of falls  Description: INTERVENTIONS:  - Educate patient/family on patient safety including physical limitations  - Instruct patient to call for assistance with activity   - Consult OT/PT to assist with strengthening/mobility   - Keep Call bell within reach  - Keep bed low and locked with side rails adjusted as appropriate  - Keep care items and personal belongings within reach  - Initiate and maintain comfort rounds  - Make Fall Risk Sign visible to staff  - Apply yellow socks and bracelet for high fall risk patients  - Consider moving patient to room near nurses station  Outcome: Progressing     Problem: PAIN - ADULT  Goal: Verbalizes/displays adequate comfort level or baseline comfort level  Description: Interventions:  - Encourage patient to monitor pain and request assistance  - Assess pain using appropriate pain scale  - Administer analgesics based on type and severity of pain and evaluate response  - Implement non-pharmacological measures as appropriate and evaluate response  - Consider cultural and social influences on pain and pain management  - Notify physician/advanced practitioner if interventions unsuccessful or patient reports new pain  Outcome: Progressing     Problem: INFECTION - ADULT  Goal: Absence or prevention of progression during hospitalization  Description: INTERVENTIONS:  - Assess and monitor for signs and symptoms of infection  - Monitor lab/diagnostic results  - Monitor all insertion sites, i e  indwelling lines, tubes, and drains  - Monitor endotracheal if appropriate and nasal secretions for changes in amount and color  - [place] appropriate cooling/warming therapies per order  - Administer medications as ordered  - Instruct and encourage patient and family to use good hand hygiene technique  - Identify and instruct in appropriate isolation precautions for identified infection/condition  Outcome: Progressing  Goal: Absence of fever/infection during neutropenic period  Description: INTERVENTIONS:  - Monitor WBC    Outcome: Progressing     Problem: SAFETY ADULT  Goal: Patient will remain free of falls  Description: INTERVENTIONS:  - Educate patient/family on patient safety including physical limitations  - Instruct patient to call for assistance with activity   - Consult OT/PT to assist with strengthening/mobility   - Keep Call bell within reach  - Keep bed low and locked with side rails adjusted as appropriate  - Keep care items and personal belongings within reach  - Initiate and maintain comfort rounds  - Make Fall Risk Sign visible to staff  - Apply yellow socks and bracelet for high fall risk patients  - Consider moving patient to room near nurses station  Outcome: Progressing  Goal: Maintain or return to baseline ADL function  Description: INTERVENTIONS:  -  Assess patient's ability to carry out ADLs; assess patient's baseline for ADL function and identify physical deficits which impact ability to perform ADLs (bathing, care of mouth/teeth, toileting, grooming, dressing, etc )  - Assess/evaluate cause of self-care deficits   - Assess range of motion  - Assess patient's mobility; develop plan if impaired  - Assess patient's need for assistive devices and provide as appropriate  - Encourage maximum independence but intervene and supervise when necessary  - Involve family in performance of ADLs  - Assess for home care needs following discharge   - Consider OT consult to assist with ADL evaluation and planning for discharge  - Provide patient education as appropriate  Outcome: Progressing  Goal: Maintains/Returns to pre admission functional level  Description: INTERVENTIONS:  - Perform BMAT or MOVE assessment daily    - Set and communicate daily mobility goal to care team and patient/family/caregiver     - Collaborate with rehabilitation services on mobility goals if consulted  - Out of bed for toileting  - Record patient progress and toleration of activity level   Outcome: Progressing     Problem: DISCHARGE PLANNING  Goal: Discharge to home or other facility with appropriate resources  Description: INTERVENTIONS:  - Identify barriers to discharge w/patient and caregiver  - Arrange for needed discharge resources and transportation as appropriate  - Identify discharge learning needs (meds, wound care, etc )  - Arrange for interpretive services to assist at discharge as needed  - Refer to Case Management Department for coordinating discharge planning if the patient needs post-hospital services based on physician/advanced practitioner order or complex needs related to functional status, cognitive ability, or social support system  Outcome: Progressing     Problem: Knowledge Deficit  Goal: Patient/family/caregiver demonstrates understanding of disease process, treatment plan, medications, and discharge instructions  Description: Complete learning assessment and assess knowledge base    Interventions:  - Provide teaching at level of understanding  - Provide teaching via preferred learning methods  Outcome: Progressing     Problem: METABOLIC, FLUID AND ELECTROLYTES - ADULT  Goal: Fluid balance maintained  Description: INTERVENTIONS:  - Monitor labs   - Monitor I/O and WT  - Instruct patient on fluid and nutrition as appropriate  - Assess for signs & symptoms of volume excess or deficit  Outcome: Progressing  Goal: Glucose maintained within target range  Description: INTERVENTIONS:  - Monitor Blood Glucose as ordered  - Assess for signs and symptoms of hyperglycemia and hypoglycemia  - Administer ordered medications to maintain glucose within target range  - Assess nutritional intake and initiate nutrition service referral as needed  Outcome: Progressing     Problem: HEMATOLOGIC - ADULT  Goal: Maintains hematologic stability  Description: INTERVENTIONS  - Assess for signs and symptoms of bleeding or hemorrhage  - Monitor labs  - Administer supportive blood products/factors as ordered and appropriate  Outcome: Progressing     Problem: MOBILITY - ADULT  Goal: Maintain or return to baseline ADL function  Description: INTERVENTIONS:  -  Assess patient's ability to carry out ADLs; assess patient's baseline for ADL function and identify physical deficits which impact ability to perform ADLs (bathing, care of mouth/teeth, toileting, grooming, dressing, etc )  - Assess/evaluate cause of self-care deficits   - Assess range of motion  - Assess patient's mobility; develop plan if impaired  - Assess patient's need for assistive devices and provide as appropriate  - Encourage maximum independence but intervene and supervise when necessary  - Involve family in performance of ADLs  - Assess for home care needs following discharge   - Consider OT consult to assist with ADL evaluation and planning for discharge  - Provide patient education as appropriate  Outcome: Progressing  Goal: Maintains/Returns to pre admission functional level  Description: INTERVENTIONS:  - Perform BMAT or MOVE assessment daily    - Set and communicate daily mobility goal to care team and patient/family/caregiver     - Collaborate with rehabilitation services on mobility goals if consulted  - Out of bed for toileting  - Record patient progress and toleration of activity level   Outcome: Progressing

## 2022-06-08 NOTE — OCCUPATIONAL THERAPY NOTE
Occupational Therapy Evaluation     Patient Name: Deidre Vazquez  [id number]'V Date: 6/8/2022  Problem List  Principal Problem:    Diabetic ulcer of left foot ([facility] )  Active Problems:    Essential hypertension    Diabetes mellitus ([facility] )    Obesity    H/O eye surgery    Acute kidney injury superimposed on chronic kidney disease ([facility] )    Past Medical History  Past Medical History:   Diagnosis Date    H/O eye surgery     High blood sugar     Hypertension      Past Surgical History  Past Surgical History:   Procedure Laterality Date    HERNIA REPAIR      KIDNEY SURGERY      MOUTH SURGERY      NE AMPUTATION METATARSAL+TOE,SINGLE Left 6/1/2022    Procedure: RAY RESECTION FOOT;  Surgeon: Akash Manzo DPM;  [facility];  Service: Podiatry    WOUND DEBRIDEMENT Left 4/28/2022    Procedure: Left foot washout;  Surgeon: Akash Manzo DPM;  [facility];  Service: Podiatry    WOUND DEBRIDEMENT Left 6/6/2022    Procedure: DEBRIDEMENT WOUND [facility] OUT); Surgeon: Akash Manzo DPM;  [facility];  Service: Podiatry         06/08/22 0854   OT Last Visit   OT Visit Date 06/08/22   Note Type   Note type Evaluation   Restrictions/Precautions   Weight Bearing Precautions Per Order Yes   LLE Weight Bearing Per Order NWB   Other Precautions WBS; Fall Risk;Pain   Pain Assessment   Pain Assessment Tool 0-10   Pain Score 5   Pain Location/Orientation Orientation: Left; Location: Foot   Patient's Stated Pain Goal No pain   Hospital Pain Intervention(s) Repositioned; Ambulation/increased activity; Emotional support; Rest   Multiple Pain Sites No   Home Living   Type of [facility] Multi-level;Stairs to enter with rails  (7 MIS, FOS to 2nd floor bed/bath)   Bathroom Shower/Tub Tub/shower unit   Bathroom Toilet Standard   Bathroom Equipment Grab bars in shower   [address] Cane;Crutches; Other (Comment)  (Rollabout)   Additional Comments Pt lives with spouse in a multi-level house with 7 MIS and FOS to 2nd floor bed/bath  Spouse works, (+) home alone at times  Spouse is able to assist as needed when home  Prior Function   Level of [place] Independent with ADLs and functional mobility   Lives With Spouse   Receives Help From Family   ADL Assistance Independent   IADLs Independent   Falls in the last 6 months 1 to 4  (1 per pt report)   Vocational On disability   Comments At baseline, pt was I w/ ADLs and functional transfers/mobility w/ use of Rollabout vs crutches since D/C on 5/2/22  Spouse performs IADLs  (+) fall PTA  Lifestyle   Autonomy At baseline, pt was I w/ ADLs and functional transfers/mobility w/ use of Rollabout vs crutches since D/C on 5/2/22  Spouse performs IADLs  (+) fall PTA  Reciprocal Relationships Spouse   Service to Others On disability   Intrinsic Gratification Going to [address] [facility] WD   ADL   Where Assessed Edge of bed   Eating Assistance 7  Independent   Grooming Assistance 7  Independent   UB Bathing Assistance 7  Independent   LB Bathing Assistance 5  Supervision/Setup   [address]  5  Supervision/Setup   Functional Assistance 5  Supervision/Setup   Additional Comments Pt denies concerns regarding ADLs, reports functioning at baseline level of performance   Bed Mobility   Supine to Sit 6  Modified independent   Additional items HOB elevated; Bedrails; Increased time required   Sit to Supine Unable to assess   Additional Comments Pt seated EOB at end of session  Call bell and phone within reach  All needs met and pt reports no further questions for OT at this time  Transfers   Sit to Stand 6  Modified independent   Additional items Bedrails; Increased time required   Stand to Sit 6  Modified independent   Additional items Bedrails; Increased time required   Functional Mobility   Functional Mobility 5  Supervision Additional Comments Assist x1; Pt demonstrated good compliance to WBS throughout   Additional items Rolling walker   Balance   Static Sitting Good   Dynamic Sitting Fair +   Static Standing Fair   Dynamic Standing Fair -   Ambulatory Fair -   Activity Tolerance   Activity Tolerance Patient tolerated treatment well   Medical Staff Made Aware Trini Garcia RN   Nurse Made Aware yes   RUE Assessment   RUE Assessment WFL   RUE Strength   RUE Overall Strength Within Functional Limits - able to perform ADL tasks with strength  (4/5 throughout)   LUE Assessment   LUE Assessment WFL   LUE Strength   LUE Overall Strength Within Functional Limits - able to perform ADL tasks with strength  (4/5 throughout)   Hand Function   Gross Motor Coordination Functional   Fine Motor Coordination Functional   Sensation   Light Touch Partial deficits in the RUE;Partial deficits in the LUE;Partial deficits in the RLE;Partial deficits in the LLE   Proprioception   Proprioception No apparent deficits   Vision-Basic Assessment   Visual History Cataracts   Vision - Complex Assessment   Acuity Able to read clock/calendar on wall without difficulty; Able to read employee name badge without difficulty   Perception   Inattention/Neglect Appears intact   Cognition   Overall Cognitive Status [facility]   Arousal/Participation Alert; Cooperative   Attention Within functional limits   Orientation Level Oriented X4   Memory Within functional limits   Following Commands Follows all commands and directions without difficulty   Comments Pleasant and cooperative  Engages in conversation appropriately   Assessment   Prognosis Good   Assessment Pt is a 59 y o  male seen for OT evaluation s/p adm to [facility] on 5/31/2022 w/ Diabetic ulceration L lateral 5th MTPJ with underlying osteomyelitis  Pt s/p R partial 5th ray amputation on 6/1/22 and s/p R foot debridement and closure on 6/6/22  NWB L LE   Comorbidities affecting pts functional performance include a significant PMH of HTN and Diabetes  Pt with active OT orders and activity orders for Activity as tolerated  Pt lives with spouse in a multi-level house with 7 MIS and FOS to 2nd floor bed/bath  Spouse works, (+) home alone at times  Spouse is able to assist as needed when home  At baseline, pt was I w/ ADLs and functional transfers/mobility w/ use of Rollabout vs crutches since D/C on 5/2/22  Spouse performs IADLs  (+) fall PTA  Upon evaluation, pt currently functioning at a Supervision-Mod I level for overall ADLs, Mod I for bed mobility, Mod I for transfers, and Supervision for functional mobility 2* the following deficits impacting occupational performance: decreased balance, decreased tolerance and increased pain and personal factors of: WBS, MIS home, steps within home, fall risk, and difficulty performing IADLs  These impairments, however, do not limit pts ability to safely engage in all baseline areas of occupation  Pt reports functioning near baseline level of performance and denies concerns regarding returning home and completing ADLs  No further acute OT needs identified at this time  Recommend continued mobilization with hospital staff while in the hospital to increase pts endurance and strength upon D/C  From OT standpoint, recommend D/C home with family support when medically cleared  D/C pt from OT caseload at this time  Goals   Patient Goals To go home today   Plan   OT Frequency Eval only  (D/C OT)   Recommendation   OT Discharge Recommendation No rehabilitation needs   OT - OK to Discharge Yes  (when medically cleared)   Additional Comments  The patient's raw score on the AM-PAC Daily Activity inpatient short form is 21, standardized score is 44 27, greater than 39 4  Patients at this level are likely to benefit from discharge to home  Please refer to the recommendation of the Occupational Therapist for safe discharge planning     AM-PAC Daily Activity Inpatient   Lower Body Dressing 3   Bathing 3   Toileting 3   Upper Body Dressing 4   Grooming 4   Eating 4   Daily Activity Raw Score 21   Daily Activity Standardized Score (Calc for Raw Score >=11) 44 27   AM-PAC Applied Cognition Inpatient   Following a Speech/Presentation 4   Understanding Ordinary Conversation 4   Taking Medications 4   Remembering Where Things Are Placed or Put Away 4   Remembering List of 4-5 Errands 4   Taking Care of Complicated Tasks 4   Applied Cognition Raw Score 24   Applied Cognition Standardized Score 62 21        Ruben Cos, OTR/L

## 2022-06-08 NOTE — ASSESSMENT & PLAN NOTE
64-year-old gentleman with diabetes mellitus presents with recurrent osteomyelitis of left foot  1  Diabetic ulceration left lateral 5th MTPJ with underlying osteomyelitis  - S/p R partial 5th ray amputation, 6/1/2022        - OR 6/6/22 for left foot washout and vac application     · Patient went to OR today,06/06/22, for  left foot washout and vac application with Dr Lynne Cardenas          Wound cx with Pseudomonas- sensitivities reviewed- transition fluoroquinolone at discharge

## 2022-06-08 NOTE — ASSESSMENT & PLAN NOTE
· VIVIANA on CKD 3 secondary to acute infection  · Did improve with IV fluids, creatinin peaked at 2 05  · Discontinued lisinopril/HCT    ·     Results from last 7 days   Lab Units 06/08/22  0556 06/05/22  0501 06/04/22  0510 06/03/22  0511 06/02/22  0437   BUN mg/dL 25 42* 49* 46* 44*   CREATININE mg/dL 1 35* 1 83* 2 05* 1 94* 1 46*   EGFR ml/min/1 73sq m 57 39 34 36 51

## 2022-06-08 NOTE — ASSESSMENT & PLAN NOTE
Lab Results   Component Value Date    HGBA1C 6 6 (H) 04/26/2022     Recent Labs     06/07/22  1608 06/08/22  0636 06/08/22  1100 06/08/22  1559   POCGLU 160* 106 167* 175*     · Continue sliding scale insulin and basal bolus protocol

## 2022-06-08 NOTE — PHYSICAL THERAPY NOTE
PHYSICAL THERAPY Screen          Patient Name: Horacio Betts  ZIDJH'Y Date: 6/8/2022 06/08/22 1233   PT Last Visit   PT Visit Date 06/08/22   Note Type   Note type Screen   Additional Comments PT evaluation completed 6/2/22  At that time pt was NWB LLE  now POD#1 washout LLE  continue w NWB per podiatry  Per OT evaluation pt w no functional changes post procedure  Able to maintain NWB during functional mobility  Pt denies concerns w dc home  Will dc orders at this time given no fxn changes and pending dc  will be available for re-consulation as needed         Deirdre Monet, PT

## 2022-06-08 NOTE — PROGRESS NOTES
[address] [place] [address]  Progress Note Petros Hernandezer 1965, 59 y o  male MRN: [id number]  Unit/Bed#: E5 -01 Encounter: [id number]  Primary Care Provider: Aguilar Rodriguez MD   Date and time admitted to hospital: 5/31/2022  9:47 AM    * Diabetic ulcer of left foot [facility])  Assessment & Plan  49-year-old gentleman with diabetes mellitus presents with recurrent osteomyelitis of left foot  1  Diabetic ulceration left lateral 5th MTPJ with underlying osteomyelitis  - S/p R partial 5th ray amputation, 6/1/2022        - OR 6/6/22 for left foot washout and vac application     · Patient went to OR today,06/06/22, for  left foot washout and vac application with Dr Jt Baltazar  Wound cx with Pseudomonas- sensitivities reviewed- transition fluoroquinolone at discharge        Acute kidney injury superimposed on chronic kidney disease ([facility] )  Assessment & Plan  · VIVIANA on CKD 3 secondary to acute infection  · Did improve with IV fluids, creatinin peaked at 2 05  · Discontinued lisinopril/HCT  ·     Results from last 7 days   Lab Units 06/08/22  0556 06/05/22  0501 06/04/22  0510 06/03/22  0511 06/02/22  0437   BUN mg/dL 25 42* 49* 46* 44*   CREATININE mg/dL 1 35* 1 83* 2 05* 1 94* 1 46*   EGFR ml/min/1 73sq m 57 44 34 39 51       H/O eye surgery  Assessment & Plan  · Follows with Dr Fabián Narvaez ([phone])  · Was supposed to have sutures removed but patient has not followed up yet  · Continue eyedrops but advised need for close follow-up    Obesity  Assessment & Plan  · Body mass index is 35 88 kg/m²      Diabetes mellitus [facility])  Assessment & Plan  Lab Results   Component Value Date    HGBA1C 6 6 (H) 04/26/2022     Recent Labs     06/07/22  1608 06/08/22  0636 06/08/22  1100 06/08/22  1559   POCGLU 160* 106 167* 175*     · Continue sliding scale insulin and basal bolus protocol    Essential hypertension  Assessment & Plan  · Continue carvedilol but discontinued lisinopril/HCT given worsening renal function  · Monitor renal function, especially in the setting of [address]  [facility] Internal Medicine Progress Note  Patient: Shanda Jesus 59 y o  male   MRN: [id number]  PCP: Azael Sanford MD  Unit/Bed#: E5 -47 Encounter: [id number]  Date Of Visit: 22    Assessment:    Principal Problem:    Diabetic ulcer of left foot ([facility] )  Active Problems:    Essential hypertension    Diabetes mellitus ([facility] )    Obesity    H/O eye surgery    Acute kidney injury superimposed on chronic kidney disease ([facility] )      Plan:    · Continue current medical management  · Discharge planning  · Continue IV antibiotics  · Continue localized wound care       VTE Pharmacologic Prophylaxis:   Pharmacologic: Heparin  Mechanical VTE Prophylaxis in Place: Yes    Patient Centered Rounds: I have performed bedside rounds with nursing staff today  Discussions with Specialists or Other Care Team Provider:  Discussed with case management and Podiatry/Infectious Disease    Education and Discussions with Family / Patient:  Patient updating family    Time Spent for Care: 45 minutes  More than 50% of total time spent on counseling and coordination of care as described above  Current Length of Stay: 8 day(s)    Current Patient Status: Inpatient   Certification Statement: The patient will continue to require additional inpatient hospital stay due to IV antibiotics    Discharge Plan / Estimated Discharge Date:  Tomorrow    Code Status: Level 3 - DNAR and DNI      Subjective:   Seen examined, no acute complaints  Reports having restless legs which has been present for a few months  He denies any nausea vomiting, no abdominal pain  Pain is controlled    A complete and comprehensive 14 point organ system review has been performed and all other systems are negative other than stated above      Objective:     Vitals:   Temp (24hrs), Av 6 °F (37 °C), Min:98 1 °F (36 7 °C), Max:99 4 °F (37 4 °C)    Temp:  [98 1 °F (36 7 °C)-99 4 °F (37 4 °C)] 98 3 °F (36 8 °C)  HR:  [66-93] 77  Resp:  [17-18] 17  BP: (128-156)/(86-98) 155/92  SpO2:  [91 %-95 %] 95 %  Body mass index is 35 88 kg/m²  Input and Output Summary (last 24 hours):     No intake or output data in the 24 hours ending 06/08/22 1652    Physical Exam:     General: well appearing, no acute distress  HEENT: atraumatic, PERRLA, moist mucosa, normal pharynx, normal tonsils and adenoids, normal tongue, no fluid in sinuses  Neck: Trachea midline, no carotid bruit, no masses  Respiratory: normal chest wall expansion, CTA B, no r/r/w, no rubs  Cardiovascular: RRR, no m/r/g, Normal S1 and S2  Abdomen: Soft, non-tender, non-distended, normal bowel sounds in all quadrants, no hepatosplenomegaly, no tympany  Rectal: deferred  Musculoskeletal: normal ROM in upper and lower extremities  Integumentary:  Dressing clear dry and intact  Heme/Lymph: no lymphadenopathy, no bruises  Neurological: Cranial Nerves II-XII grossly intact, no tics, normal sensation to pressure and light touch  Psychiatric: cooperative with normal mood, affect, and cognition      Additional Data:     Labs:    Results from last 7 days   Lab Units 06/08/22  0556 06/03/22  0511 06/02/22  0437   WBC Thousand/uL 4 47   < > 5 74   HEMOGLOBIN g/dL 10 3*   < > 12 6   HEMATOCRIT % 31 7*   < > 38 5   PLATELETS Thousands/uL 237   < > 205   NEUTROS PCT %  --   --  70   LYMPHS PCT %  --   --  13*   LYMPHO PCT % 27  --   --    MONOS PCT %  --   --  13*   MONO PCT % 9  --   --    EOS PCT % 5  --  3    < > = values in this interval not displayed  Results from last 7 days   Lab Units 06/08/22  0556 06/05/22  0501   POTASSIUM mmol/L 4 4 4 7   CHLORIDE mmol/L 104 102   CO2 mmol/L 23 22   BUN mg/dL 25 42*   CREATININE mg/dL 1 35* 1 83*   CALCIUM mg/dL 8 7 8 7   ALK PHOS U/L  --  75   ALT U/L  --  19   AST U/L  --  13           * I Have Reviewed All Lab Data Listed Above  * Additional Pertinent Lab Tests Reviewed:  [facility] Admission Reviewed    Imaging:    Imaging Reports Reviewed Today Include:  No new imaging  Imaging Personally Reviewed by Myself Includes:  No new imaging    Recent Cultures (last 7 days):     Results from last 7 days   Lab Units 06/01/22  1814   GRAM STAIN RESULT  Rare Polys  No organisms seen       Last 24 Hours Medication List:   Current Facility-Administered Medications   Medication Dose Route Frequency Provider Last Rate    acetaminophen  650 mg Oral Q6H PRN Derenda Sales, DPM      ALPRAZolam  1 mg Oral Once in imaging Derenda Sales, DPM      vitamin C  1,000 mg Oral BID Derenda Sales, DPM      carvedilol  25 mg Oral BID With Meals Derenda Sales, DPM      cyclobenzaprine  5 mg Oral BID PRN Derenda Sales, DPM      glycerin-hypromellose-  1 drop Both Eyes Q4H PRN Derenda Sales, DPM      heparin (porcine)  5,000 Units Subcutaneous [facility] Derenda Sales, DPM      HYDROmorphone  0 5 mg Intravenous Q4H PRN Derenda Sales, DPM      insulin lispro  2-12 Units Subcutaneous TID AC Derenda Sales, DPM      Loteprednol Etabonate  1 drop Ophthalmic BID Derenda Sales, DPM      oxyCODONE  10 mg Oral Q4H PRN Derenda Sales, DPM      piperacillin-tazobactam  3 375 g Intravenous Q6H Derenda Sales, DPM 3 375 g (06/08/22 1339)    traMADol  50 mg Oral BID PRN Derenda Sales, DPM      vitamin E (tocopherol)  400 Units Oral Daily Derenda Sales, DPM      zinc sulfate  220 mg Oral Daily Derenda Sales, DPM          Today, Patient Was Seen By: Donna Miller DO    ** Please Note: This note was completed in part utilizing [employer] Direct Software  Grammatical errors, random word insertions, spelling mistakes, and incomplete sentences may be an occasional consequence of this system secondary to software limitations, ambient noise, and hardware issues  If you have any questions or concerns about the content, text, or information contained within the body of this dictation, please contact the provider for clarification   **

## 2022-06-08 NOTE — ASSESSMENT & PLAN NOTE
· Follows with Dr Smith Parents ([phone])  · Was supposed to have sutures removed but patient has not followed up yet    · Continue eyedrops but advised need for close follow-up Dry/Warm

## 2022-06-08 NOTE — PROGRESS NOTES
Progress Note - Infectious Disease   Nnamdi Bones 59 y o  male MRN: [id number]  Unit/Bed#: E5 -01 Encounter: [id number]      Impression/Recommendations:  1  Infected left diabetic foot ulcer with underlying 5th MTP and phalanx osteomyelitis   Patient is status post left 5th ray resection, for surgical cure   Operative culture with growth of Pseudomonas again  Patient is status post repeat I&D, without deep purulence at with normal appearing bone  Continue IV Zosyn for now  Wound care per Podiatry  Transition p o  Levaquin (500 mg daily) at discharge  Treat x7 days post I&D, another 5 days      2  CKD stage 3  Creatinine stable  Antibiotic dosages adjusted accordingly  Monitor creatinine      3  DM, type 2  Elevated blood sugar is risk for wound nonhealing and infection above  Management per primary service      4  Morbid obesity      Discussed with patient in detail regarding the above plan  Discussed with Dr Elina Corey from im service  Okay for discharge from ID viewpoint      Antibiotics:  Zosyn  POD # 7/2     Subjective:  Patient's foot pain better today  Temperature stays down   No chills  He is tolerating antibiotic well   No nausea, vomiting or diarrhea      Objective:  Vitals:  Temp:  [98 1 °F (36 7 °C)-99 4 °F (37 4 °C)] 98 1 °F (36 7 °C)  HR:  [66-93] 66  Resp:  [17-18] 17  BP: (128-181)/() 151/88  SpO2:  [92 %-95 %] 95 %  Temp (24hrs), Av 7 °F (37 1 °C), Min:98 1 °F (36 7 °C), Max:99 4 °F (37 4 °C)  Current: Temperature: 98 1 °F (36 7 °C)    Physical Exam:     General: Awake, alert, cooperative, no distress  Neck:  Supple  No mass  No lymphadenopathy  Lungs: Expansion symmetric, no rales, no wheezing, respirations unlabored  Heart:  Regular rate and rhythm, S1 and S2 normal, no murmur  Abdomen: Soft, nondistended, non-tender, bowel sounds active all four quadrants, no masses, no organomegaly  Extremities: Stable leg edema  Left foot with dressing in place    Dressing is dry   No erythema/warmth beyond dressing  Mild and improved tenderness  Skin:  No rash  Neuro: Moves all extremities  Invasive Devices  Report    Peripheral Intravenous Line  Duration           Peripheral IV 06/08/22 Dorsal (posterior); Left Hand <1 day          Drain  Duration           Open Drain Left;Lateral Foot 1 day                Labs studies:   I have personally reviewed pertinent labs  Results from last 7 days   Lab Units 06/08/22  0556 06/05/22  0501 06/04/22  0510 06/03/22  0511   POTASSIUM mmol/L 4 4 4 7 4 7 4 8   CHLORIDE mmol/L 104 102 102 104   CO2 mmol/L 23 22 23 23   BUN mg/dL 25 42* 49* 46*   CREATININE mg/dL 1 35* 1 83* 2 05* 1 94*   EGFR ml/min/1 73sq m 57 39 34 36   CALCIUM mg/dL 8 7 8 7 8 5 8 3   AST U/L  --  13 12 10   ALT U/L  --  19 22 20   ALK PHOS U/L  --  75 66 60     Results from last 7 days   Lab Units 06/08/22  0556 06/05/22  0501 06/04/22  0510   WBC Thousand/uL 4 47 6 25 6 07   HEMOGLOBIN g/dL 10 3* 10 7* 10 6*   PLATELETS Thousands/uL 237 232 210     Results from last 7 days   Lab Units 06/01/22  1814   GRAM STAIN RESULT  Rare Polys  No organisms seen       Imaging Studies:   I have personally reviewed pertinent imaging study reports and images in PACS  EKG, Pathology, and Other Studies:   I have personally reviewed pertinent reports  rolling walker

## 2022-06-09 VITALS
SYSTOLIC BLOOD PRESSURE: 145 MMHG | RESPIRATION RATE: 18 BRPM | BODY MASS INDEX: 35.83 KG/M2 | DIASTOLIC BLOOD PRESSURE: 89 MMHG | HEIGHT: 72 IN | WEIGHT: 264.55 LBS | OXYGEN SATURATION: 90 % | TEMPERATURE: 98.7 F | HEART RATE: 86 BPM

## 2022-06-09 LAB
BASOPHILS # BLD AUTO: 0.04 THOUSANDS/ΜL (ref 0–0.1)
BASOPHILS NFR BLD AUTO: 1 % (ref 0–1)
EOSINOPHIL # BLD AUTO: 0.21 THOUSAND/ΜL (ref 0–0.61)
EOSINOPHIL NFR BLD AUTO: 4 % (ref 0–6)
ERYTHROCYTE [DISTWIDTH] IN BLOOD BY AUTOMATED COUNT: 12.4 % (ref 11.6–15.1)
GLUCOSE SERPL-MCNC: 122 MG/DL (ref 65–140)
GLUCOSE SERPL-MCNC: 166 MG/DL (ref 65–140)
GLUCOSE SERPL-MCNC: 173 MG/DL (ref 65–140)
HCT VFR BLD AUTO: 32.7 % (ref 36.5–49.3)
HGB BLD-MCNC: 10.8 G/DL (ref 12–17)
IMM GRANULOCYTES # BLD AUTO: 0.02 THOUSAND/UL (ref 0–0.2)
IMM GRANULOCYTES NFR BLD AUTO: 0 % (ref 0–2)
LYMPHOCYTES # BLD AUTO: 1.24 THOUSANDS/ΜL (ref 0.6–4.47)
LYMPHOCYTES NFR BLD AUTO: 23 % (ref 14–44)
MCH RBC QN AUTO: 30.5 PG (ref 26.8–34.3)
MCHC RBC AUTO-ENTMCNC: 33 G/DL (ref 31.4–37.4)
MCV RBC AUTO: 92 FL (ref 82–98)
MONOCYTES # BLD AUTO: 0.55 THOUSAND/ΜL (ref 0.17–1.22)
MONOCYTES NFR BLD AUTO: 10 % (ref 4–12)
NEUTROPHILS # BLD AUTO: 3.28 THOUSANDS/ΜL (ref 1.85–7.62)
NEUTS SEG NFR BLD AUTO: 62 % (ref 43–75)
NRBC BLD AUTO-RTO: 0 /100 WBCS
PLATELET # BLD AUTO: 243 THOUSANDS/UL (ref 149–390)
PMV BLD AUTO: 9.5 FL (ref 8.9–12.7)
RBC # BLD AUTO: 3.54 MILLION/UL (ref 3.88–5.62)
WBC # BLD AUTO: 5.34 THOUSAND/UL (ref 4.31–10.16)

## 2022-06-09 PROCEDURE — 85025 COMPLETE CBC W/AUTO DIFF WBC: CPT | Performed by: INTERNAL MEDICINE

## 2022-06-09 PROCEDURE — 82948 REAGENT STRIP/BLOOD GLUCOSE: CPT

## 2022-06-09 PROCEDURE — 99232 SBSQ HOSP IP/OBS MODERATE 35: CPT | Performed by: INTERNAL MEDICINE

## 2022-06-09 PROCEDURE — 99239 HOSP IP/OBS DSCHRG MGMT >30: CPT | Performed by: INTERNAL MEDICINE

## 2022-06-09 RX ORDER — LEVOFLOXACIN 500 MG/1
500 TABLET, FILM COATED ORAL EVERY 24 HOURS
Qty: 4 TABLET | Refills: 0 | Status: SHIPPED | OUTPATIENT
Start: 2022-06-10 | End: 2022-06-14

## 2022-06-09 RX ORDER — TRAMADOL HYDROCHLORIDE 50 MG/1
50 TABLET ORAL EVERY 6 HOURS PRN
Qty: 24 TABLET | Refills: 0 | Status: SHIPPED | OUTPATIENT
Start: 2022-06-09 | End: 2022-06-15

## 2022-06-09 RX ORDER — LEVOFLOXACIN 500 MG/1
500 TABLET, FILM COATED ORAL EVERY 24 HOURS
Status: DISCONTINUED | OUTPATIENT
Start: 2022-06-09 | End: 2022-06-09 | Stop reason: HOSPADM

## 2022-06-09 RX ADMIN — LOTEPREDNOL ETABONATE 1 DROP: 10 SUSPENSION TOPICAL at 08:08

## 2022-06-09 RX ADMIN — ACETAMINOPHEN 650 MG: 325 TABLET, FILM COATED ORAL at 06:17

## 2022-06-09 RX ADMIN — PIPERACILLIN AND TAZOBACTAM 3.38 G: 3; .375 INJECTION, POWDER, LYOPHILIZED, FOR SOLUTION INTRAVENOUS at 08:08

## 2022-06-09 RX ADMIN — ZINC SULFATE 220 MG (50 MG) CAPSULE 220 MG: CAPSULE at 08:08

## 2022-06-09 RX ADMIN — OXYCODONE HYDROCHLORIDE 10 MG: 10 TABLET ORAL at 00:09

## 2022-06-09 RX ADMIN — Medication 400 UNITS: at 08:08

## 2022-06-09 RX ADMIN — PIPERACILLIN AND TAZOBACTAM 3.38 G: 3; .375 INJECTION, POWDER, LYOPHILIZED, FOR SOLUTION INTRAVENOUS at 02:14

## 2022-06-09 RX ADMIN — CARVEDILOL 25 MG: 25 TABLET, FILM COATED ORAL at 08:08

## 2022-06-09 RX ADMIN — OXYCODONE HYDROCHLORIDE AND ACETAMINOPHEN 1000 MG: 500 TABLET ORAL at 08:08

## 2022-06-09 RX ADMIN — TRAMADOL HYDROCHLORIDE 50 MG: 50 TABLET, COATED ORAL at 06:13

## 2022-06-09 RX ADMIN — LEVOFLOXACIN 500 MG: 500 TABLET, FILM COATED ORAL at 10:34

## 2022-06-09 NOTE — ASSESSMENT & PLAN NOTE
Lab Results   Component Value Date    HGBA1C 6 6 (H) 04/26/2022     Recent Labs     06/08/22  1559 06/08/22  2202 06/09/22  0707 06/09/22  1117   POCGLU 175* 176* 122 166*     · Continue sliding scale insulin and basal bolus protocol

## 2022-06-09 NOTE — CASE MANAGEMENT
Case Management Discharge Planning Note    Patient name Deidre Splinter  Location [place] [address]  [id number]-* MRN [id number]  : 1965 Date 2022       Current Admission Date: 2022  Current Admission Diagnosis:Diabetic ulcer of left foot [facility])   Patient Active Problem List    Diagnosis Date Noted    Acute kidney injury superimposed on chronic kidney disease ([facility] ) 2022    [facility] 2022    Pre-operative clearance 2022    H/O eye surgery 2022    Diabetic foot ulcer [facility] 2022    Dog bite of arm, right, sequela 2022    Cellulitis 2022    Chronic kidney disease, stage 3 ([facility] ) 2022    Erectile dysfunction 2021    Neuropathy 2021    Low back pain with sciatica 2021    Neck pain 2021    Lumbar radiculopathy 2021    Cervical radiculopathy 2021    Myofascial pain 2021    Obesity 2021    Stage 2 chronic kidney disease 2021    Essential hypertension     Diabetes mellitus ([facility] )       LOS (days): 9  Geometric Mean LOS (GMLOS) (days): 5 80  Days to GMLOS:-3 3     OBJECTIVE:  Risk of Unplanned Readmission Score: 30 5         Current admission status: Inpatient   [facility] [address] [facility] [address] [place] [address] [address] [facility]  Phone: [phone] Fax: [phone]    Primary Care Provider: Darrel Trinidad MD    Primary Insurance: MEDICARE  Secondary Insurance: BLUE CROSS    DISCHARGE DETAILS:          Comments - Freedom of Choice: LSW covering today  MD has discharged pt home with [facility]  [facility]S to [facility] today  Made PCP appointment with Dr Rory Trevino for [facility] N[facility]i 15, 2022 at 3:20 pm  This was put on AVS  RN reports pt has a ride coming at 1600 today

## 2022-06-09 NOTE — DISCHARGE SUMMARY
[facility]  Discharge- Romaine Dietz 1965, 59 y o  male MRN: [id number]  Unit/Bed#: E5 -01 Encounter: [id number]  Primary Care Provider: Celina Diaz MD   Date and time admitted to hospital: 5/31/2022  9:47 AM    Admitting Provider:  Evangelina Vaz MD  Discharge Provider:  Gavino Seymour DO  Admission Date: 5/31/2022       Discharge Date: 06/09/22   LOS: 9  Primary Care Physician at Discharge: Celina Diaz [phone]    HOSPITAL COURSE:  Romaine Dietz is a 59 y o  male who presented infected left diabetic foot ulcer with underlying 5th MTP in phalanx osteomyelitis  The patient underwent left 5th ray resection for surgical cure  Operative culture did demonstrate Pseudomonas  He also went repeat I&D without deep purulence and normal appearing bone  His cellulitis was much improved  He was subsequently transition to oral Levaquin from intravenous Zosyn  He will be treated for 7 days post infection drainage  Patient did have elevated creatinine and acute kidney injury, his antibiotic dosages were adjusted  Initially as hydrochlorothiazide lisinopril combination tablet was held  The patient subsequently clinically improved and was medically cleared for discharge  At the time of discharge he was without acute complaint he was afebrile and tolerating oral diet  He will be discharged with an additional 4 days of Levaquin will follow-up Podiatry as scheduled  DISCHARGE DIAGNOSES  * Diabetic ulcer of left foot [facility])  Assessment & Plan  57-year-old gentleman with diabetes mellitus presents with recurrent osteomyelitis of left foot  1  Diabetic ulceration left lateral 5th MTPJ with underlying osteomyelitis  - S/p R partial 5th ray amputation, 6/1/2022        - OR 6/6/22 for left foot washout and vac application     · Patient went to OR today,06/06/22, for  left foot washout and vac application with Dr Miguel Gandhi          Wound cx with Pseudomonas- sensitivities reviewed- transition fluoroquinolone at discharge        Acute kidney injury superimposed on chronic kidney disease ([facility] [address] )  Assessment & Plan  · VIVIANA on CKD 3 secondary to acute infection  · Did improve with IV fluids, creatinin peaked at 2 05  · Resume lisinopril hydrochlorothiazide at discharge  ·     Results from last 7 days   Lab Units 06/08/22  0556 06/05/22  0501 06/04/22  0510 06/03/22  0511   BUN mg/dL 25 42* 49* 46*   CREATININE mg/dL 1 35* 1 83* 2 05* 1 94*   EGFR ml/min/1 73sq m 57 39 34 36       H/O eye surgery  Assessment & Plan  · Follows with Dr Jt Pringle ([phone])  · Was supposed to have sutures removed but patient has not followed up yet  · Continue eyedrops but advised need for close follow-up    Obesity  Assessment & Plan  · Body mass index is 35 88 kg/m²  Diabetes mellitus [facility])  Assessment & Plan  Lab Results   Component Value Date    HGBA1C 6 6 (H) 04/26/2022     Recent Labs     06/08/22  1559 06/08/22  2202 06/09/22  0707 06/09/22  1117   POCGLU 175* 176* 122 166*     · Continue sliding scale insulin and basal bolus protocol    Essential hypertension  Assessment & Plan  · Resume home medication at discharge  · Monitor renal function, especially in the setting of Zosyn      CONSULTING PROVIDERS   IP CONSULT TO INFECTIOUS DISEASES  IP CONSULT TO PODIATRY  IP CONSULT TO CASE MANAGEMENT    PROCEDURES PERFORMED  Procedure(s) (LRB):  DEBRIDEMENT WOUND ([address] OUT) (Left)    RADIOLOGY RESULTS  XR foot left 3+ views      Impression: Interval resection of 5th ray to the level of the metatarsal base  Workstation performed: [id number]     XR foot 3+ views LEFT      Impression: Bony destruction in the head of the 5th metatarsal with a pathologic fracture in keeping with osteomyelitis which appears worse compared to the most recent study  There is questionable erosion of the proximal, lateral aspect of the proximal phalanx of the 5th toe  This raises concern for the presence of septic arthritis  Workstation performed: [id number]     XR foot 3+ views LEFT      Impression: Lucency and cortical erosion of 5th metatarsal head adjacent to patient's ulcer, new from April 2022, in keeping with osteomyelitis  MRI foot/forefoot toes left wo contrast      Impression: Acute osteomyelitis with associated fracture involving the fifth metatarsal head and shaft  Findings suspicious for early acute osteomyelitis involving the base of the fifth proximal phalanx  Soft tissue ulceration along the lateral aspect of the fifth metatarsal head with a sinus tract extending into the fifth metatarsal head  VAS lower limb arterial duplex, complete bilateral      Result Date: 5/31/2022    CONCLUSION: Impression: RIGHT LOWER LIMB LIMITED: Evaluation shows no evidence of thrombus in the common femoral vein  Doppler evaluation shows a normal response to augmentation maneuvers  LEFT LOWER LIMB: No evidence of acute or chronic deep vein thrombosis No evidence of superficial thrombophlebitis noted  Doppler evaluation shows a normal response to augmentation maneuvers   Popliteal, posterior tibial and anterior tibial arterial Doppler waveform's are Triphasic/Hyperemic  SIGNATURE: Electronically Signed Cesar Hinton on 2022-05-31 03:46:57 PM      LABS  Results from last 7 days   Lab Units 06/09/22  0531 06/08/22  0556 06/05/22  0501 06/04/22  0510 06/03/22  0511   WBC Thousand/uL 5 34 4 47 6 25 6 07 5 70   HEMOGLOBIN g/dL 10 8* 10 3* 10 7* 10 6* 10 9*   HEMATOCRIT % 32 7* 31 7* 33 3* 32 7* 33 6*   MCV fL 92 91 93 93 95   TOTAL NEUT ABS Thousand/uL  --  2 64  --   --   --    PLATELETS Thousands/uL 243 237 232 210 188     Results from last 7 days   Lab Units 06/08/22  0556 06/05/22  0501 06/04/22  0510 06/03/22  0511   SODIUM mmol/L 136 135* 136 137   POTASSIUM mmol/L 4 4 4 7 4 7 4 8   CHLORIDE mmol/L 104 102 102 104   CO2 mmol/L 23 22 23 23   BUN mg/dL 25 42* 49* 46*   CREATININE mg/dL 1 35* 1 83* 2 05* 1 94*   CALCIUM mg/dL 8 7 8 7 8 5 8 3   ALBUMIN g/dL  --  2 8* 2 8* 2 8*   TOTAL BILIRUBIN mg/dL  --  0 46 0 41 0 39   ALK PHOS U/L  --  75 66 60   ALT U/L  --  19 22 20   AST U/L  --  13 12 10   EGFR ml/min/1 73sq m 57 39 34 36   GLUCOSE RANDOM mg/dL 106 131 170* 149*                  Results from last 7 days   Lab Units 06/09/22  1117 06/09/22  0707 06/08/22  2202 06/08/22  1559 06/08/22  1100 06/08/22  0636 06/07/22  1608 06/07/22  1105 06/07/22  0711 06/06/22  2105   POC GLUCOSE mg/dl 166* 122 176* 175* 167* 106 160* 165* 128 115                       Cultures:         Invalid input(s): URIBILINOGEN              PHYSICAL EXAM:  Vitals:   Blood Pressure: 145/89 (06/09/22 1521)  Pulse: 86 (06/09/22 1521)  Temperature: 98 7 °F (37 1 °C) (06/09/22 1521)  Temp Source: Temporal (06/06/22 1825)  Respirations: 18 (06/09/22 1521)  Height: 6' (182 9 cm) (05/31/22 1424)  Weight - Scale: 120 kg (264 lb 8 8 oz) (06/03/22 0758)  SpO2: 90 % (06/09/22 1521)      General: well appearing, no acute distress  HEENT: atraumatic, PERRLA, moist mucosa, normal pharynx, normal tonsils and adenoids, normal tongue, no fluid in sinuses  Neck: Trachea midline, no carotid bruit, no masses  Respiratory: normal chest wall expansion, CTA B, no r/r/w, no rubs  Cardiovascular: RRR, no m/r/g, Normal S1 and S2  Abdomen: Soft, non-tender, non-distended, normal bowel sounds in all quadrants, no hepatosplenomegaly, no tympany  Rectal: deferred  Musculoskeletal: normal ROM in upper and lower extremities  Integumentary:  Dressing clear dry and intact   Heme/Lymph: no lymphadenopathy, no bruises  Neurological: Cranial Nerves II-XII grossly intact, no tics, normal sensation to pressure and light touch  Psychiatric: cooperative with normal mood, affect, and cognition      Discharge Disposition: Home/Self Care      Test Results Pending at Discharge:   Pending Labs     Order Current Status    Tissue Exam In process              Medications   · Summary of Medication Adjustments made as a result of this hospitalization:  See discharge list  · Medication Dosing Tapers - Please refer to Discharge Medication List for details on any medication dosing tapers (if applicable to patient)  · Discharge Medication List: See after visit summary for reconciled discharge medications  Diet restrictions:         Diet Orders   (From admission, onward)             Start     Ordered    06/06/22 2020  Diet Yung/CHO Controlled; Consistent Carbohydrate Diet Level 2 (5 carb servings/75 grams CHO/meal)  Diet effective now        References:    Nutrtion Support Algorithm Enteral vs  Parenteral   Question Answer Comment   Diet Type Yung/CHO Controlled    Yung/CHO Controlled Consistent Carbohydrate Diet Level 2 (5 carb servings/75 grams CHO/meal)    RD to adjust diet per protocol? Yes        06/06/22 2019              Activity restrictions: No strenuous activity  Discharge Condition: good    Outpatient Follow-Up and Discharge Instructions  See after visit summary section titled Discharge Instructions for information provided to patient and family  Code Status: Level 3 - DNAR and DNI  Discharge Statement   I spent 35 minutes discharging the patient  This time was spent on the day of discharge  Greater than 50% of total time was spent with the patient and / or family counseling and / or coordination of care  ** Please Note: This note was completed in part utilizing M-Modal Fluency Direct Software  Grammatical errors, random word insertions, spelling mistakes, and incomplete sentences may be an occasional consequence of this system secondary to software limitations, ambient noise, and hardware issues  If you have any questions or concerns about the content, text, or information contained within the body of this dictation, please contact the provider for clarification  **

## 2022-06-09 NOTE — PLAN OF CARE
Problem: Potential for Falls  Goal: Patient will remain free of falls  Description: INTERVENTIONS:  - Educate patient/family on patient safety including physical limitations  - Instruct patient to call for assistance with activity   - Consult OT/PT to assist with strengthening/mobility   - Keep Call bell within reach  - Keep bed low and locked with side rails adjusted as appropriate  - Keep care items and personal belongings within reach  - Initiate and maintain comfort rounds  - Make Fall Risk Sign visible to staff  - Apply yellow socks and bracelet for high fall risk patients  - Consider moving patient to room near nurses station  Outcome: Progressing     Problem: PAIN - ADULT  Goal: Verbalizes/displays adequate comfort level or baseline comfort level  Description: Interventions:  - Encourage patient to monitor pain and request assistance  - Assess pain using appropriate pain scale  - Administer analgesics based on type and severity of pain and evaluate response  - Implement non-pharmacological measures as appropriate and evaluate response  - Consider cultural and social influences on pain and pain management  - Notify physician/advanced practitioner if interventions unsuccessful or patient reports new pain  Outcome: Progressing     Problem: INFECTION - ADULT  Goal: Absence or prevention of progression during hospitalization  Description: INTERVENTIONS:  - Assess and monitor for signs and symptoms of infection  - Monitor lab/diagnostic results  - Monitor all insertion sites, i e  indwelling lines, tubes, and drains  - Monitor endotracheal if appropriate and nasal secretions for changes in amount and color  - [place] appropriate cooling/warming therapies per order  - Administer medications as ordered  - Instruct and encourage patient and family to use good hand hygiene technique  - Identify and instruct in appropriate isolation precautions for identified infection/condition  Outcome: Progressing  Goal: Absence of fever/infection during neutropenic period  Description: INTERVENTIONS:  - Monitor WBC    Outcome: Progressing     Problem: SAFETY ADULT  Goal: Patient will remain free of falls  Description: INTERVENTIONS:  - Educate patient/family on patient safety including physical limitations  - Instruct patient to call for assistance with activity   - Consult OT/PT to assist with strengthening/mobility   - Keep Call bell within reach  - Keep bed low and locked with side rails adjusted as appropriate  - Keep care items and personal belongings within reach  - Initiate and maintain comfort rounds  - Make Fall Risk Sign visible to staff  - Apply yellow socks and bracelet for high fall risk patients  - Consider moving patient to room near nurses station  Outcome: Progressing  Goal: Maintain or return to baseline ADL function  Description: INTERVENTIONS:  -  Assess patient's ability to carry out ADLs; assess patient's baseline for ADL function and identify physical deficits which impact ability to perform ADLs (bathing, care of mouth/teeth, toileting, grooming, dressing, etc )  - Assess/evaluate cause of self-care deficits   - Assess range of motion  - Assess patient's mobility; develop plan if impaired  - Assess patient's need for assistive devices and provide as appropriate  - Encourage maximum independence but intervene and supervise when necessary  - Involve family in performance of ADLs  - Assess for home care needs following discharge   - Consider OT consult to assist with ADL evaluation and planning for discharge  - Provide patient education as appropriate  Outcome: Progressing  Goal: Maintains/Returns to pre admission functional level  Description: INTERVENTIONS:  - Perform BMAT or MOVE assessment daily    - Set and communicate daily mobility goal to care team and patient/family/caregiver     - Collaborate with rehabilitation services on mobility goals if consulted  - Out of bed for toileting  - Record patient progress and toleration of activity level   Outcome: Progressing     Problem: DISCHARGE PLANNING  Goal: Discharge to home or other facility with appropriate resources  Description: INTERVENTIONS:  - Identify barriers to discharge w/patient and caregiver  - Arrange for needed discharge resources and transportation as appropriate  - Identify discharge learning needs (meds, wound care, etc )  - Arrange for interpretive services to assist at discharge as needed  - Refer to Case Management Department for coordinating discharge planning if the patient needs post-hospital services based on physician/advanced practitioner order or complex needs related to functional status, cognitive ability, or social support system  Outcome: Progressing     Problem: Knowledge Deficit  Goal: Patient/family/caregiver demonstrates understanding of disease process, treatment plan, medications, and discharge instructions  Description: Complete learning assessment and assess knowledge base    Interventions:  - Provide teaching at level of understanding  - Provide teaching via preferred learning methods  Outcome: Progressing     Problem: METABOLIC, FLUID AND ELECTROLYTES - ADULT  Goal: Fluid balance maintained  Description: INTERVENTIONS:  - Monitor labs   - Monitor I/O and WT  - Instruct patient on fluid and nutrition as appropriate  - Assess for signs & symptoms of volume excess or deficit  Outcome: Progressing  Goal: Glucose maintained within target range  Description: INTERVENTIONS:  - Monitor Blood Glucose as ordered  - Assess for signs and symptoms of hyperglycemia and hypoglycemia  - Administer ordered medications to maintain glucose within target range  - Assess nutritional intake and initiate nutrition service referral as needed  Outcome: Progressing     Problem: HEMATOLOGIC - ADULT  Goal: Maintains hematologic stability  Description: INTERVENTIONS  - Assess for signs and symptoms of bleeding or hemorrhage  - Monitor labs  - Administer supportive blood products/factors as ordered and appropriate  Outcome: Progressing     Problem: MOBILITY - ADULT  Goal: Maintain or return to baseline ADL function  Description: INTERVENTIONS:  -  Assess patient's ability to carry out ADLs; assess patient's baseline for ADL function and identify physical deficits which impact ability to perform ADLs (bathing, care of mouth/teeth, toileting, grooming, dressing, etc )  - Assess/evaluate cause of self-care deficits   - Assess range of motion  - Assess patient's mobility; develop plan if impaired  - Assess patient's need for assistive devices and provide as appropriate  - Encourage maximum independence but intervene and supervise when necessary  - Involve family in performance of ADLs  - Assess for home care needs following discharge   - Consider OT consult to assist with ADL evaluation and planning for discharge  - Provide patient education as appropriate  Outcome: Progressing  Goal: Maintains/Returns to pre admission functional level  Description: INTERVENTIONS:  - Perform BMAT or MOVE assessment daily    - Set and communicate daily mobility goal to care team and patient/family/caregiver     - Collaborate with rehabilitation services on mobility goals if consulted  - Out of bed for toileting  - Record patient progress and toleration of activity level   Outcome: Progressing

## 2022-06-09 NOTE — PROGRESS NOTES
Progress Note - Infectious Disease   Chandni Hunt 59 y o  male MRN: [id number]  Unit/Bed#: E5 -01 Encounter: [id number]      Impression/Recommendations:  1  Infected left diabetic foot ulcer with underlying 5th MTP and phalanx osteomyelitis   Patient is status post left 5th ray resection, for surgical cure   Operative culture with growth of Pseudomonas again   Patient is status post repeat I&D, without deep purulence at with normal appearing bone  Cellulitis is much improved  At this point, antibiotic can be changed to p o  Johnkofi Mcconnell Change antibiotic to p o  Levaquin  Wound care per Podiatry  Treat x7 days post I&D, another 4 days      2  CKD stage 3  Creatinine stable  Antibiotic dosages adjusted accordingly  Monitor creatinine      3  DM, type 2  Elevated blood sugar is risk for wound nonhealing and infection above  Management per primary service      4  Morbid obesity      Discussed with patient in detail regarding the above plan  Discussed with Podiatry service  Okay for discharge from ID viewpoint      Antibiotics:  Zosyn  POD # 8/3     Subjective:  Patient's foot pain improving  Temperature stays down   No chills  He is tolerating antibiotic well   No nausea, vomiting or diarrhea      Objective:  Vitals:  Temp:  [98 3 °F (36 8 °C)-98 5 °F (36 9 °C)] 98 5 °F (36 9 °C)  HR:  [64-77] 64  Resp:  [18] 18  BP: (151-158)/(90-98) 158/98  SpO2:  [91 %-95 %] 95 %  Temp (24hrs), Av 4 °F (36 9 °C), Min:98 3 °F (36 8 °C), Max:98 5 °F (36 9 °C)  Current: Temperature: 98 5 °F (36 9 °C)    Physical Exam:     General: Awake, alert, cooperative, no distress  Neck:  Supple  No mass  No lymphadenopathy  Lungs: Expansion symmetric, no rales, no wheezing, respirations unlabored  Heart:  Regular rate and rhythm, S1 and S2 normal, no murmur  Abdomen: Soft, nondistended, non-tender, bowel sounds active all four quadrants, no masses, no organomegaly  Extremities: Improved leg edema    Foot examined at dressing change  Incision intact, with sutures in place  No purulence  Mild and much improved erythema/warmth  Mild tenderness  Skin:  No rash  Neuro: Moves all extremities  Invasive Devices  Report    Peripheral Intravenous Line  Duration           Peripheral IV 06/08/22 Dorsal (posterior); Left Hand 1 day          Drain  Duration           Open Drain Left;Lateral Foot 2 days                Labs studies:   I have personally reviewed pertinent labs  Results from last 7 days   Lab Units 06/08/22  0556 06/05/22  0501 06/04/22  0510 06/03/22  0511   POTASSIUM mmol/L 4 4 4 7 4 7 4 8   CHLORIDE mmol/L 104 102 102 104   CO2 mmol/L 23 22 23 23   BUN mg/dL 25 42* 49* 46*   CREATININE mg/dL 1 35* 1 83* 2 05* 1 94*   EGFR ml/min/1 73sq m 57 39 34 36   CALCIUM mg/dL 8 7 8 7 8 5 8 3   AST U/L  --  13 12 10   ALT U/L  --  19 22 20   ALK PHOS U/L  --  75 66 60     Results from last 7 days   Lab Units 06/09/22  0531 06/08/22  0556 06/05/22  0501   WBC Thousand/uL 5 34 4 47 6 25   HEMOGLOBIN g/dL 10 8* 10 3* 10 7*   PLATELETS Thousands/uL 243 237 232           Imaging Studies:   I have personally reviewed pertinent imaging study reports and images in PACS  EKG, Pathology, and Other Studies:   I have personally reviewed pertinent reports

## 2022-06-09 NOTE — ASSESSMENT & PLAN NOTE
· VIVIANA on CKD 3 secondary to acute infection  · Did improve with IV fluids, creatinin peaked at 2 05  · Resume lisinopril hydrochlorothiazide at discharge  ·     Results from last 7 days   Lab Units 06/08/22  0556 06/05/22  0501 06/04/22  0510 06/03/22  0511   BUN mg/dL 25 42* 49* 46*   CREATININE mg/dL 1 35* 1 83* 2 05* 1 94*   EGFR ml/min/1 73sq m 57 39 34 36

## 2022-06-09 NOTE — CASE MANAGEMENT
Case Management Discharge Planning Note    Patient name Yoni Farley  Location [place] [address]  [phone] MRN [id number]  : 1965 Date 2022       Current Admission Date: 2022  Current Admission Diagnosis:Diabetic ulcer of left foot [facility])   Patient Active Problem List    Diagnosis Date Noted    Acute kidney injury superimposed on chronic kidney disease ([facility] [address] ) 2022    [facility] 2022    Pre-operative clearance 2022    H/O eye surgery 2022    Diabetic foot ulcer ([facility] [address] ) 2022    Dog bite of arm, right, sequela 2022    Cellulitis 2022    Chronic kidney disease, stage 3 ([facility] ) 2022    Erectile dysfunction 2021    Neuropathy 2021    Low back pain with sciatica 2021    Neck pain 2021    Lumbar radiculopathy 2021    Cervical radiculopathy 2021    Myofascial pain 2021    Obesity 2021    Stage 2 chronic kidney disease 2021    Essential hypertension     Diabetes mellitus ([facility] )       LOS (days): 9  Geometric Mean LOS (GMLOS) (days): 5 80  Days to GMLOS:-3 3     OBJECTIVE:  Risk of Unplanned Readmission Score: 30 5         Current admission status: Inpatient   [address] Pharm[address]   [address] ,  [address] [place] [address] [address] [facility]  Phone: [phone] Fax: [phone]    Primary Care Provider: Araceli Chang MD    Primary Insurance: MEDICARE  Secondary Insurance: BLUE CROSS    DISCHARGE DETAILS:    Pt requested CM encounter  This CM met w/ Pt to identify Pts needs/concerns  Pt states he is need of a  to assist with applying for home care services, so that his wife can work as his caregiver  Pt states he does not have access to social workers through [facility]  Pt then asked if the family caregiver program exists    CM provided overview of the program (waiver) that facilitates this service  Pt confirms he would be eligible for Medicaid  CM informed Pt that there OP CMs available if Pts primary care team is [facility]  Pt states he is in process of transferring his care to [facility]  Pt has identified Dr Patti Lomeli as his preferred PCP  [facility] [address])       CM sent in basket message to Juliette Perez, and requested she follow Pt with the goal of connecting Pt to an OP CM, after Pt has established his [facility] care team    CM sent message through [address] [place] [address]/[facility] of Pts request for a , and alerting them to Pts discharge

## 2022-06-09 NOTE — PROGRESS NOTES
[facility] Podiatry - Progress Note  Patient: Rahul Betts 59 y o  male   MRN: [id number]  PCP: Lucina Banks MD  Unit/Bed#: E5 -23 Encounter: [id number]  Date Of Visit: 22    ASSESSMENT:    Rahul Betts is a 59 y o  male with:    1  Diabetic ulceration left lateral 5th MTPJ with underlying osteomyelitis  - S/p R partial 5th ray amputation (DOS 22)  -S/p R foot debridement and closure (DOS 22)  2  Dorsal 2nd digit eschar, left foot  3  T2DM  4  VIVIANA on CKD2  5  Lumbar radiculopathy  6  Dependent rubor        PLAN:    · Dorsal foot erythema almost 100% resolved after 24 hours of elevation alone  · Ok to transition to po antibiotics today based on ID recommendations  · Continue elevation above the level of the heart   · Encouraged a bedside commode since he will have to sleep on a different floor than his bathroom is on  He refused this  He was educated again on the importance of non-weight bearing and that if he puts any amount of weight on the foot that there is a large chance of incisional dehiscence and need for repeat surgery  He is understanding of this risk and again said that he felt the best set up at his home was for him to go up and down 15 steps to go to the bathroom  · May remove ACE bandage as needed, this was only applied to assist with keeping dressing on    Weight bearing status: NWB LLE  Antibiotics: unasyn->po levaquin    Disposition: Stable for dc     SUBJECTIVE:     The patient was seen, evaluated, and assessed at bedside today  The patient was awake, alert, and in no acute distress  No acute events overnight  The patient reports minimal pain which he attributes to the ACE bandage  Pain improved when ACE removed  Patient denies N/V/F/chills/SOB/CP        OBJECTIVE:     Vitals:   /98   Pulse 64   Temp 98 5 °F (36 9 °C)   Resp 18   Ht 6' (1 829 m)   Wt 120 kg (264 lb 8 8 oz)   SpO2 95%   BMI 35 88 kg/m²     Temp (24hrs), Av 4 °F (36 9 °C), Min:98 3 °F (36 8 °C), Max:98 5 °F (36 9 °C)      Physical Exam :     General:  Alert, cooperative, and in no distress  Lower extremity exam:  Cardiovascular status at baseline  Neurological status at baseline  Musculoskeletal status at baseline      Very light erythema noted less than 2 cm from the incisional site  Erythema nearly completely resolved from yesterday with elevation  This reduces with dependency  Slight warmth to the touch of the dorsal foot  No bogginess noted or fluctuance  Minimal sanguinous drainage on dressing  Incision well coapted with sutures intact  Clinical Images 06/09/22:          Additional Data:     Labs:    Results from last 7 days   Lab Units 06/09/22  0531   WBC Thousand/uL 5 34   HEMOGLOBIN g/dL 10 8*   HEMATOCRIT % 32 7*   PLATELETS Thousands/uL 243   NEUTROS PCT % 62   LYMPHS PCT % 23   MONOS PCT % 10   EOS PCT % 4     Results from last 7 days   Lab Units 06/08/22  0556 06/05/22  0501   POTASSIUM mmol/L 4 4 4 7   CHLORIDE mmol/L 104 102   CO2 mmol/L 23 22   BUN mg/dL 25 42*   CREATININE mg/dL 1 35* 1 83*   CALCIUM mg/dL 8 7 8 7   ALK PHOS U/L  --  75   ALT U/L  --  19   AST U/L  --  13           * I Have Reviewed All Lab Data Listed Above  Recent Cultures (last 7 days):               Imaging: I have personally reviewed pertinent films in PACS  EKG, Pathology, and Other Studies: I have personally reviewed pertinent reports  ** Please Note: Portions of the record may have been created with voice recognition software  Occasional wrong word or "sound a like" substitutions may have occurred due to the inherent limitations of voice recognition software  Read the chart carefully and recognize, using context, where substitutions have occurred   **

## 2022-06-09 NOTE — CASE MANAGEMENT
Case Management Discharge Planning Note    Patient name Natan Hastings  Location [address] [place] [address] [address]  [phone] MRN [id number]  : 1965 Date 2022       Current Admission Date: 2022  Current Admission Diagnosis:Diabetic ulcer of left foot [facility])   Patient Active Problem List    Diagnosis Date Noted    Acute kidney injury superimposed on chronic kidney disease ([facility] [address] ) 2022    [facility] 2022    Pre-operative clearance 2022    H/O eye surgery 2022    Diabetic foot ulcer ([facility] ) 2022    Dog bite of arm, right, sequela 2022    Cellulitis 2022    Chronic kidney disease, stage 3 ([facility] ) 2022    Erectile dysfunction 2021    Neuropathy 2021    Low back pain with sciatica 2021    Neck pain 2021    Lumbar radiculopathy 2021    Cervical radiculopathy 2021    Myofascial pain 2021    Obesity 2021    Stage 2 chronic kidney disease 2021    Essential hypertension     Diabetes mellitus ([facility] )       LOS (days): 9  Geometric Mean LOS (GMLOS) (days): 5 80  Days to GMLOS:-3 3     OBJECTIVE:  Risk of Unplanned Readmission Score: 30 5         Current admission status: Inpatient   Preferred Pharmacy:   [facility] [address] ,  [address] [place] [address] [address] [facility]  Phone: [phone] Fax: [phone]    Primary Care Provider: Ritu Sow MD    Primary Insurance: MEDICARE  Secondary Insurance: BLUE CROSS    DISCHARGE DETAILS:          Comments - Freedom of Choice: Met  with pt and he reports he has crutches  Pt stated he plans to changed PCP to Dr Cole Sales with [facility]  Informed pt a PCP appointment was made with current PCP and once pt can switch to Dr Penney Claude, then [facility] PCP appointment with current doctor                                                                               IMM Given (Date):: 22  IMM Given to[de-identified] Patient (Read IMM, copy given and copy put in bin )

## 2022-06-09 NOTE — ASSESSMENT & PLAN NOTE
· Follows with Dr Mami Turpin ([phone])  · Was supposed to have sutures removed but patient has not followed up yet    · Continue eyedrops but advised need for close follow-up

## 2022-06-09 NOTE — ASSESSMENT & PLAN NOTE
75-year-old gentleman with diabetes mellitus presents with recurrent osteomyelitis of left foot  1  Diabetic ulceration left lateral 5th MTPJ with underlying osteomyelitis  - S/p R partial 5th ray amputation, 6/1/2022        - OR 6/6/22 for left foot washout and vac application     · Patient went to OR today,06/06/22, for  left foot washout and vac application with Dr Ernesto Leon          Wound cx with Pseudomonas- sensitivities reviewed- transition fluoroquinolone at discharge

## 2022-06-09 NOTE — PLAN OF CARE
Problem: Potential for Falls  Goal: Patient will remain free of falls  Description: INTERVENTIONS:  - Educate patient/family on patient safety including physical limitations  - Instruct patient to call for assistance with activity   - Consult OT/PT to assist with strengthening/mobility   - Keep Call bell within reach  - Keep bed low and locked with side rails adjusted as appropriate  - Keep care items and personal belongings within reach  - Initiate and maintain comfort rounds  - Make Fall Risk Sign visible to staff  - Apply yellow socks and bracelet for high fall risk patients  - Consider moving patient to room near nurses station  Outcome: Progressing     Problem: PAIN - ADULT  Goal: Verbalizes/displays adequate comfort level or baseline comfort level  Description: Interventions:  - Encourage patient to monitor pain and request assistance  - Assess pain using appropriate pain scale  - Administer analgesics based on type and severity of pain and evaluate response  - Implement non-pharmacological measures as appropriate and evaluate response  - Consider cultural and social influences on pain and pain management  - Notify physician/advanced practitioner if interventions unsuccessful or patient reports new pain  Outcome: Progressing     Problem: INFECTION - ADULT  Goal: Absence or prevention of progression during hospitalization  Description: INTERVENTIONS:  - Assess and monitor for signs and symptoms of infection  - Monitor lab/diagnostic results  - Monitor all insertion sites, i e  indwelling lines, tubes, and drains  - Monitor endotracheal if appropriate and nasal secretions for changes in amount and color  - Wheeler appropriate cooling/warming therapies per order  - Administer medications as ordered  - Instruct and encourage patient and family to use good hand hygiene technique  - Identify and instruct in appropriate isolation precautions for identified infection/condition  Outcome: Progressing  Goal: Absence of fever/infection during neutropenic period  Description: INTERVENTIONS:  - Monitor WBC    Outcome: Progressing     Problem: SAFETY ADULT  Goal: Patient will remain free of falls  Description: INTERVENTIONS:  - Educate patient/family on patient safety including physical limitations  - Instruct patient to call for assistance with activity   - Consult OT/PT to assist with strengthening/mobility   - Keep Call bell within reach  - Keep bed low and locked with side rails adjusted as appropriate  - Keep care items and personal belongings within reach  - Initiate and maintain comfort rounds  - Make Fall Risk Sign visible to staff  - Apply yellow socks and bracelet for high fall risk patients  - Consider moving patient to room near nurses station  Outcome: Progressing  Goal: Maintain or return to baseline ADL function  Description: INTERVENTIONS:  -  Assess patient's ability to carry out ADLs; assess patient's baseline for ADL function and identify physical deficits which impact ability to perform ADLs (bathing, care of mouth/teeth, toileting, grooming, dressing, etc )  - Assess/evaluate cause of self-care deficits   - Assess range of motion  - Assess patient's mobility; develop plan if impaired  - Assess patient's need for assistive devices and provide as appropriate  - Encourage maximum independence but intervene and supervise when necessary  - Involve family in performance of ADLs  - Assess for home care needs following discharge   - Consider OT consult to assist with ADL evaluation and planning for discharge  - Provide patient education as appropriate  Outcome: Progressing  Goal: Maintains/Returns to pre admission functional level  Description: INTERVENTIONS:  - Perform BMAT or MOVE assessment daily    - Set and communicate daily mobility goal to care team and patient/family/caregiver     - Collaborate with rehabilitation services on mobility goals if consulted  - Out of bed for toileting  - Record patient progress and toleration of activity level   Outcome: Progressing     Problem: DISCHARGE PLANNING  Goal: Discharge to home or other facility with appropriate resources  Description: INTERVENTIONS:  - Identify barriers to discharge w/patient and caregiver  - Arrange for needed discharge resources and transportation as appropriate  - Identify discharge learning needs (meds, wound care, etc )  - Arrange for interpretive services to assist at discharge as needed  - Refer to Case Management Department for coordinating discharge planning if the patient needs post-hospital services based on physician/advanced practitioner order or complex needs related to functional status, cognitive ability, or social support system  Outcome: Progressing     Problem: Knowledge Deficit  Goal: Patient/family/caregiver demonstrates understanding of disease process, treatment plan, medications, and discharge instructions  Description: Complete learning assessment and assess knowledge base    Interventions:  - Provide teaching at level of understanding  - Provide teaching via preferred learning methods  Outcome: Progressing     Problem: METABOLIC, FLUID AND ELECTROLYTES - ADULT  Goal: Fluid balance maintained  Description: INTERVENTIONS:  - Monitor labs   - Monitor I/O and WT  - Instruct patient on fluid and nutrition as appropriate  - Assess for signs & symptoms of volume excess or deficit  Outcome: Progressing  Goal: Glucose maintained within target range  Description: INTERVENTIONS:  - Monitor Blood Glucose as ordered  - Assess for signs and symptoms of hyperglycemia and hypoglycemia  - Administer ordered medications to maintain glucose within target range  - Assess nutritional intake and initiate nutrition service referral as needed  Outcome: Progressing     Problem: HEMATOLOGIC - ADULT  Goal: Maintains hematologic stability  Description: INTERVENTIONS  - Assess for signs and symptoms of bleeding or hemorrhage  - Monitor labs  - Administer supportive blood products/factors as ordered and appropriate  Outcome: Progressing     Problem: MOBILITY - ADULT  Goal: Maintain or return to baseline ADL function  Description: INTERVENTIONS:  -  Assess patient's ability to carry out ADLs; assess patient's baseline for ADL function and identify physical deficits which impact ability to perform ADLs (bathing, care of mouth/teeth, toileting, grooming, dressing, etc )  - Assess/evaluate cause of self-care deficits   - Assess range of motion  - Assess patient's mobility; develop plan if impaired  - Assess patient's need for assistive devices and provide as appropriate  - Encourage maximum independence but intervene and supervise when necessary  - Involve family in performance of ADLs  - Assess for home care needs following discharge   - Consider OT consult to assist with ADL evaluation and planning for discharge  - Provide patient education as appropriate  Outcome: Progressing  Goal: Maintains/Returns to pre admission functional level  Description: INTERVENTIONS:  - Perform BMAT or MOVE assessment daily    - Set and communicate daily mobility goal to care team and patient/family/caregiver     - Collaborate with rehabilitation services on mobility goals if consulted  - Out of bed for toileting  - Record patient progress and toleration of activity level   Outcome: Progressing

## 2022-06-14 ENCOUNTER — PATIENT OUTREACH (OUTPATIENT)
Dept: CASE MANAGEMENT | Facility: OTHER | Age: 57
End: 2022-06-14

## 2022-06-14 NOTE — PROGRESS NOTES
Called patient & introduced him to complex care management  Patient agrees to outreach  Start of care assessment & care plan initiated  Patient's primary concern is pain management for chronic back pain  He has pain 4-8/10 daily for lower back to neck pain & b/l leg pain  He takes tramadol at bedtime & PRN  His back pain is worse with activity  He reports PT in the past that exacerbated back pain  He would be interested in working with pain management, but states he was told he cannot return to  spine & pain  He will discuss pain management options further with his PCP  He states no left foot pain s/p partial amputation  He does not have a wound vac  He will see Dr Martita Brandt for suture removal this week  He reports NWB to his left foot & keeps it elevated on the sofa  He ambulates with crutches to the bathroom  He will have his eye sutures removed on 7/11/22 by Dr Eloise Morrow  He denies fever, aches, chills, n/v/d  Meds reviewed  Patient able to verbalize understanding of each medication  He took his last dose of Levaquin today  He states he completed the eye drops  The patient states his home health, [facility], put in a referral to their   He was told by inpatient that he would get meals on wheels  He was also recommended to have his wife made his paid caregiver & would like to discuss this with the   His wife assists with IADLs, but works & cannot provide transportation for acute needs  - Message left with [facility] for Nathalie  Patient states his wife will transport him to his appointment at [facility] to establish care with Dr Iman Carroll tomorrow

## 2022-06-15 ENCOUNTER — OFFICE VISIT (OUTPATIENT)
Dept: INTERNAL MEDICINE CLINIC | Facility: CLINIC | Age: 57
End: 2022-06-15
Payer: MEDICARE

## 2022-06-15 VITALS
HEIGHT: 72 IN | HEART RATE: 89 BPM | BODY MASS INDEX: 35.89 KG/M2 | TEMPERATURE: 98.1 F | WEIGHT: 265 LBS | DIASTOLIC BLOOD PRESSURE: 74 MMHG | OXYGEN SATURATION: 97 % | SYSTOLIC BLOOD PRESSURE: 122 MMHG

## 2022-06-15 DIAGNOSIS — Z13.220 SCREENING FOR HYPERLIPIDEMIA: ICD-10-CM

## 2022-06-15 DIAGNOSIS — E53.8 FOLATE DEFICIENCY: ICD-10-CM

## 2022-06-15 DIAGNOSIS — D64.9 ANEMIA, UNSPECIFIED TYPE: ICD-10-CM

## 2022-06-15 DIAGNOSIS — Z12.5 PROSTATE CANCER SCREENING: ICD-10-CM

## 2022-06-15 DIAGNOSIS — E53.8 VITAMIN B 12 DEFICIENCY: ICD-10-CM

## 2022-06-15 DIAGNOSIS — M79.605 LEFT LEG PAIN: ICD-10-CM

## 2022-06-15 DIAGNOSIS — E11.69 TYPE 2 DIABETES MELLITUS WITH OTHER SPECIFIED COMPLICATION, WITHOUT LONG-TERM CURRENT USE OF INSULIN (HCC): Primary | ICD-10-CM

## 2022-06-15 DIAGNOSIS — I10 ESSENTIAL HYPERTENSION: ICD-10-CM

## 2022-06-15 DIAGNOSIS — E61.1 IRON DEFICIENCY: ICD-10-CM

## 2022-06-15 PROBLEM — Z01.818 PRE-OPERATIVE CLEARANCE: Status: RESOLVED | Noted: 2022-05-31 | Resolved: 2022-06-15

## 2022-06-15 PROBLEM — N18.2 STAGE 2 CHRONIC KIDNEY DISEASE: Status: RESOLVED | Noted: 2021-03-23 | Resolved: 2022-06-15

## 2022-06-15 PROCEDURE — 99496 TRANSJ CARE MGMT HIGH F2F 7D: CPT | Performed by: INTERNAL MEDICINE

## 2022-06-15 RX ORDER — TRAMADOL HYDROCHLORIDE 50 MG/1
50 TABLET ORAL EVERY 6 HOURS PRN
Qty: 60 TABLET | Refills: 0 | Status: SHIPPED | OUTPATIENT
Start: 2022-06-15 | End: 2022-07-28 | Stop reason: SDUPTHER

## 2022-06-15 RX ORDER — ERYTHROMYCIN 5 MG/G
OINTMENT OPHTHALMIC
COMMUNITY
Start: 2022-04-22

## 2022-06-15 NOTE — ASSESSMENT & PLAN NOTE
Status post hospitalization for debridement of chronic ulcer of the left foot with osteomyelitis of the bone  Amputation of the distal portion of the 5th toe left foot    Follow-up with Podiatry Services patient has completed his antibiotic therapy  Lab Results   Component Value Date    HGBA1C 6 6 (H) 04/26/2022

## 2022-06-15 NOTE — ASSESSMENT & PLAN NOTE
Lab Results   Component Value Date    EGFR 57 06/08/2022    EGFR 39 06/05/2022    EGFR 34 06/04/2022    CREATININE 1 35 (H) 06/08/2022    CREATININE 1 83 (H) 06/05/2022    CREATININE 2 05 (H) 06/04/2022   Blood testing during hospitalization confirmed a stage IIIB chronic kidney disease  Follow-up testing has been requested to re-evaluate kidney performance encouraged patient to continue with aggressive hydration and avoid nonsteroidal anti inflammatory medications

## 2022-06-15 NOTE — ASSESSMENT & PLAN NOTE
Assessment of the patient's type 2 diabetes indicates of a healthy hemoglobin A1c and adequate control on fasting blood sugar with a reading of 135 today  Recommend continued follow-up with endocrinology services through [facility]  Recommend continuation of current was epic and Jardiance medication    Lab Results   Component Value Date    HGBA1C 6 6 (H) 04/26/2022

## 2022-06-15 NOTE — ASSESSMENT & PLAN NOTE
Blood pressure assessment today confirms good control of hypertension recommend continuation of his lisinopril hydrochlorothiazide and carvedilol medication    Comprehensive metabolic profile has been requested to review kidney performance and electrolyte balance on this medication

## 2022-06-15 NOTE — PROGRESS NOTES
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  Amputation of the distal portion of the 5th toe left foot  Follow-up with Podiatry Services patient has completed his antibiotic therapy  Lab Results   Component Value Date    HGBA1C 6 6 (H) 04/26/2022       Essential hypertension  Blood pressure assessment today confirms good control of hypertension recommend continuation of his lisinopril hydrochlorothiazide and carvedilol medication  Comprehensive metabolic profile has been requested to review kidney performance and electrolyte balance on this medication    Chronic kidney disease, stage 3 [facility])  Lab Results   Component Value Date    EGFR 57 06/08/2022    EGFR 39 06/05/2022    EGFR 34 06/04/2022    CREATININE 1 35 (H) 06/08/2022    CREATININE 1 83 (H) 06/05/2022    CREATININE 2 05 (H) 06/04/2022   Blood testing during hospitalization confirmed a stage IIIB chronic kidney disease  Follow-up testing has been requested to re-evaluate kidney performance encouraged patient to continue with aggressive hydration and avoid nonsteroidal anti inflammatory medications  TCM Call (since 5/15/2022)     None      TCM Call (since 5/15/2022)     None             Diagnoses and all orders for this visit:    Essential hypertension  -     Comprehensive metabolic panel;  Future    Type 2 diabetes mellitus with other specified complication, without long-term current use of insulin (HCC)  -     Comprehensive metabolic panel; Future    Left leg pain  -     traMADol (Ultram) 50 mg tablet; Take 1 tablet (50 mg total) by mouth every 6 (six) hours as needed for moderate pain for up to 30 doses    Screening for hyperlipidemia  -     Lipid panel; Future    Anemia, unspecified type  -     CBC and differential; Future    Prostate cancer screening  -     PSA, Total Screen; Future    Vitamin B 12 deficiency  -     Vitamin B12; Future    Folate deficiency  -     Folate; Future    Iron deficiency  -     Iron; Future    Other orders  -     erythromycin (ILOTYCIN) ophthalmic ointment        Subjective:      Patient ID: Christiano Forrest is a 59 y o  male  This 58-year-old gentleman presents today with his significant other to establish medical care with our practice  He is a former patient of family practice at [facility]  He was recently hospitalized at [facility] for an infection of his left foot and underwent debridement of wound of the 5th toe and also a partial amputation of the 5th toe  He is currently under the care of podiatry services through [facility] for postoperative care  He was treated with antibiotics after the surgery and has completed his antibiotic course  He has a history of type 2 diabetes which was diagnosed approximately year and half ago  He is seen by Dr Erica Burton at [facility] endocrinology for management of his type 2 diabetes currently is on a combination of Jardiance and Ozempic for control of his diabetes  Most recent hemoglobin A1c was 6 6%  He checks his fasting blood sugars at home his fasting blood sugar today was 135  Patient also has a history of hypertension which dates back proximally 14 years  Currently he is on losartan hydrochlorothiazide and carvedilol for management of his hypertension he has had no signs or symptoms of uncontrolled hypertension or hypotension recently      Review of his medical chart indicates that he has chronic kidney disease stage IIIB 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  I suspect this plus his diabetes is the cause for his chronic stage 3 kidney disease  He tries to maintain aggressive hydration on a daily basis consuming at least 3 quarts of fluid daily  He also has a diagnosis of restless leg syndrome and has been tried on multiple medications to control the symptoms by previous physicians  He indicates that tramadol prescribed by his previous physician of 50 mg around bedtime seems to work the best to control his symptoms  Past surgical history for the patient other than the partial amputation of the 5th toe on the left foot recently includes of right knee surgery and exploratory surgery on his ruptured kidney and dental extractions  His family medical history significant for ovarian cancer and diabetes  He denies any tobacco use and has only occasional consumption of beer  The following portions of the patient's history were reviewed and updated as appropriate:   He  has a past medical history of H/O eye surgery, High blood sugar, and Hypertension  He   Patient Active Problem List    Diagnosis Date Noted    Acute kidney injury superimposed on chronic kidney disease ([facility] [address] ) 05/31/2022    Diabetic ulcer of left foot ([facility] [address] ) 05/31/2022    H/O eye surgery 04/27/2022    Diabetic foot ulcer ([facility] ) 04/08/2022    Dog bite of arm, right, sequela 02/23/2022    Cellulitis 02/22/2022    Chronic kidney disease, stage 3 ([address] ) 02/22/2022    Erectile dysfunction 11/02/2021    Neuropathy 06/17/2021    Low back pain with sciatica 03/26/2021    Neck pain 03/26/2021    Lumbar radiculopathy 03/26/2021    Cervical radiculopathy 03/26/2021    Myofascial pain 03/26/2021    Obesity 03/23/2021    Essential hypertension     Diabetes mellitus ([facility] )      He  has a past surgical history that includes Hernia repair; Kidney surgery; Mouth surgery;  Wound debridement (Left, 4/28/2022); pr amputation metatarsal+toe,single (Left, 6/1/2022); and Wound debridement (Left, 6/6/2022)  His family history is not on file  He  reports that he has never smoked  He has never used smokeless tobacco  He reports current alcohol use  He reports that he does not use drugs  Current Outpatient Medications   Medication Sig Dispense Refill    Alpha-Lipoic Acid 600 MG CAPS Take 600 mg by mouth 2 (two) times a day        Apple Cider Vinegar 300 MG TABS Take 300 mg by mouth daily        Ascorbic Acid (vitamin C) 1000 MG tablet Take 1,000 mg by mouth 2 (two) times a day      BENFOTIAMINE PO Take 300 mg by mouth 2 (two) times a day        beta carotene 30 MG capsule Take 30 mg by mouth 2 (two) times a day        Blood Glucose Monitoring Suppl (OneTouch Verio Sync System) w/Device KIT Use daily Test sugar daily in the morning  1 kit 0    carvedilol (COREG) 25 mg tablet Take 1 tablet (25 mg total) by mouth 2 (two) times a day with meals 60 tablet 1    Empagliflozin (Jardiance) 25 MG TABS Take 1 tablet (25 mg total) by mouth daily 90 tablet 3    erythromycin (ILOTYCIN) ophthalmic ointment       Garlic 7888 MG CAPS Take by mouth 2 (two) times a day       gentamicin (GARAMYCIN) 0 3 % ophthalmic solution 1 drop 2 (two) times a day Right eye      glucose blood (OneTouch Verio) test strip Test sugar daily in the morning   100 each 3    lisinopril-hydrochlorothiazide (PRINZIDE,ZESTORETIC) 20-12 5 MG per tablet Take 1 tablet by mouth 2 (two) times a day 60 tablet 3    Loteprednol Etabonate 1 % SUSP Apply to eye 2 (two) times a day I drop right eye      Semaglutide,0 25 or 0 5MG/DOS, (Ozempic, 0 25 or 0 5 MG/DOSE,) 2 MG/1 5ML SOPN Inject 0 25 mg under the skin once a week 15 mL 3    traMADol (Ultram) 50 mg tablet Take 1 tablet (50 mg total) by mouth every 6 (six) hours as needed for moderate pain for up to 30 doses 60 tablet 0    Turmeric (QC TUMERIC COMPLEX PO) Take 1,000 mg by mouth once Tumeric /curcimin      VITAMIN D PO Take by mouth 2 (two) times a day      vitamin E, tocopherol, 400 units capsule Take 400 Units by mouth daily      Zinc 50 MG CAPS Take by mouth 2 (two) times a day        No current facility-administered medications for this visit       Review of Systems   Musculoskeletal:        Left foot in a postoperative boot   All other systems reviewed and are negative  Objective:      /74   Pulse 89   Temp 98 1 °F (36 7 °C)   Ht 6' (1 829 m)   Wt 120 kg (265 lb)   SpO2 97%   BMI 35 94 kg/m²          Physical Exam  Constitutional:       General: He is not in acute distress  Appearance: He is well-developed  He is not ill-appearing  HENT:      Head: Normocephalic  Right Ear: Hearing, tympanic membrane, ear canal and external ear normal       Left Ear: Hearing, tympanic membrane, ear canal and external ear normal       Nose: Nose normal       Mouth/Throat:      Mouth: Mucous membranes are moist       Pharynx: Oropharynx is clear  Eyes:      General:         Right eye: No discharge  Left eye: No discharge  Extraocular Movements: Extraocular movements intact  Conjunctiva/sclera: Conjunctivae normal       Pupils: Pupils are equal, round, and reactive to light  Neck:      Thyroid: No thyromegaly  Vascular: No carotid bruit  Cardiovascular:      Rate and Rhythm: Normal rate and regular rhythm  Heart sounds: Normal heart sounds, S1 normal and S2 normal  No murmur heard  Pulmonary:      Effort: Pulmonary effort is normal       Breath sounds: Normal breath sounds  No wheezing, rhonchi or rales  Abdominal:      General: Bowel sounds are normal  There is no distension  Palpations: Abdomen is soft  There is no mass  Tenderness: There is no abdominal tenderness  There is no guarding  Musculoskeletal:         General: Normal range of motion  Cervical back: Normal range of motion and neck supple  No rigidity or tenderness        Comments: Left foot in a postsurgical boot dressing intact no evidence of drainage   Lymphadenopathy:      Cervical: No cervical adenopathy  Skin:     General: Skin is warm and dry  Neurological:      General: No focal deficit present  Mental Status: He is alert and oriented to person, place, and time  Deep Tendon Reflexes: Reflexes are normal and symmetric  Psychiatric:         Mood and Affect: Mood normal          Behavior: Behavior normal          Thought Content:  Thought content normal          Judgment: Judgment normal

## 2022-06-16 ENCOUNTER — PATIENT OUTREACH (OUTPATIENT)
Dept: INTERNAL MEDICINE CLINIC | Facility: CLINIC | Age: 57
End: 2022-06-16

## 2022-06-16 DIAGNOSIS — Z78.9 NEED FOR FOLLOW-UP BY SOCIAL WORKER: Primary | ICD-10-CM

## 2022-06-16 NOTE — PROGRESS NOTES
Ed returned my phone call  He was very please with his appointment yesterday with Dr Gurinder Benton  Continues to work with [facility] home health  They do not have a  to come out to meet him  I will have Valeria Marinelli  reach out next week to discuss waiver program and any other needs he may have  Home health nurse comes three times a week and PT once a week  Still is non weight baring on left foot  Has leg scooter and walker  His pharmacy is a bit of a distance but loves there service and does not want to switch  Very complimentary of [facility]'s network   He has my contact information

## 2022-06-21 ENCOUNTER — PATIENT OUTREACH (OUTPATIENT)
Dept: INTERNAL MEDICINE CLINIC | Facility: CLINIC | Age: 57
End: 2022-06-21

## 2022-06-21 NOTE — PROGRESS NOTES
Referral received from Chandan with request for OP SWCM to outreach patient and discuss Waiver Program   Per chart review, patient receives nursing and home PT (weekly) services through [address]  Patient would like his wife to become paid caregiver for him as she assists with all ADLs  Patient uses leg scooter and walker  Spoke with patient who confirmed he lives with VON Gil  Patient receives SSDI ($1,400/month) and [employer] works  Patient has Medicare A&B and [employer] as secondary insurance  They share household expenses  Patient needs assistance with all ADLs, was driving but is unable to drive at this time due to non-weightbearing status with Podiatry, Ghassanvikash Mercado transports to appts around her work schedule (patient is hoping to be able to drive once he is able to bear weight), and they would like for Ghassan Mercado to become paid caregiver for patient  Discussed Waiver Program and requirements for eligibility  patient understanding and would like assistance in starting this referral   Patient is not a [place]  Call placed to Waiver; spoke with Representative and created new referral for patient  They will call patient to schedule assessments  Encouraged patient to call later this week if he does not receive call from [facility]/Waiver to schedule these assessments  Mailed checklist of Waiver application steps to patient to review as well  Patient requested referral to Me[employer] on Wheels  Encouraged him to call insurance to inquire about home food benefit  Patient will call insurance to ask about this  Call placed to [facility]  Voicemail left with patient information  Requested call to patient and OP SWCM to confirm referral was received  No other needs at this time  Will continue to follow

## 2022-06-23 NOTE — PROGRESS NOTES
Confirmation received from [employer] rep stating they received referral and will be outreaching patient today to continue application process

## 2022-06-30 ENCOUNTER — PATIENT OUTREACH (OUTPATIENT)
Dept: INTERNAL MEDICINE CLINIC | Facility: CLINIC | Age: 57
End: 2022-06-30

## 2022-06-30 NOTE — PROGRESS NOTES
Spoke with Ed he is doing good  Continues with home health nurses for foot wound care  To see surgeon tomorrow  Has phone appointment today for waiver process  Looking forward to getting the okay to drive and buy a new pair of boots  No concerns or questions

## 2022-07-05 ENCOUNTER — PATIENT OUTREACH (OUTPATIENT)
Dept: INTERNAL MEDICINE CLINIC | Facility: CLINIC | Age: 57
End: 2022-07-05

## 2022-07-05 NOTE — PROGRESS NOTES
Call placed to patient to check status of Waiver Assessment (completed on 6/30) and Meals on Wheels   Spoke with patient who states waiver assessment was completed  Patient is awaiting mail from [facility] in order to continue process  Patient received call from [facility] but declined this service at this time due to amount of time it will take to complete process and start receiving meals  No other needs at this time  Will follow-up in 2-3 weeks to check status of Waiver

## 2022-07-11 ENCOUNTER — TELEPHONE (OUTPATIENT)
Dept: INTERNAL MEDICINE CLINIC | Facility: CLINIC | Age: 57
End: 2022-07-11

## 2022-07-11 ENCOUNTER — 1 MONTH POST-OP (OUTPATIENT)
Dept: URBAN - METROPOLITAN AREA CLINIC 6 | Facility: CLINIC | Age: 57
End: 2022-07-11

## 2022-07-11 DIAGNOSIS — Z96.1: ICD-10-CM

## 2022-07-11 PROCEDURE — 99024 POSTOP FOLLOW-UP VISIT: CPT

## 2022-07-11 ASSESSMENT — VISUAL ACUITY
OD_SC: 20/50
OS_SC: 20/25-1

## 2022-07-11 ASSESSMENT — KERATOMETRY
OS_AXISANGLE2_DEGREES: 90
OS_K1POWER_DIOPTERS: 38.50
OS_AXISANGLE_DEGREES: 180
OS_K2POWER_DIOPTERS: 38.50

## 2022-07-11 NOTE — TELEPHONE ENCOUNTER
Physical therapy called to let you know that the pt had a fall on Friday while at podiatrist office  He had an injury to his left knee  They just wanted to give you an FYI

## 2022-07-24 PROCEDURE — 99284 EMERGENCY DEPT VISIT MOD MDM: CPT

## 2022-07-25 ENCOUNTER — HOSPITAL ENCOUNTER (EMERGENCY)
Facility: HOSPITAL | Age: 57
Discharge: HOME | End: 2022-07-25
Attending: EMERGENCY MEDICINE
Payer: MEDICARE

## 2022-07-25 ENCOUNTER — TELEPHONE (OUTPATIENT)
Dept: PHYSICAL THERAPY | Facility: OTHER | Age: 57
End: 2022-07-25

## 2022-07-25 VITALS
HEIGHT: 72 IN | HEART RATE: 75 BPM | TEMPERATURE: 99.2 F | WEIGHT: 265 LBS | BODY MASS INDEX: 35.89 KG/M2 | RESPIRATION RATE: 16 BRPM | SYSTOLIC BLOOD PRESSURE: 125 MMHG | OXYGEN SATURATION: 95 % | DIASTOLIC BLOOD PRESSURE: 75 MMHG

## 2022-07-25 DIAGNOSIS — M54.50 ACUTE EXACERBATION OF CHRONIC LOW BACK PAIN: ICD-10-CM

## 2022-07-25 DIAGNOSIS — G89.29 ACUTE EXACERBATION OF CHRONIC LOW BACK PAIN: ICD-10-CM

## 2022-07-25 DIAGNOSIS — M79.10 MYALGIA: Primary | ICD-10-CM

## 2022-07-25 LAB
ALBUMIN SERPL BCP-MCNC: 3.7 G/DL (ref 3.5–5)
ALP SERPL-CCNC: 58 U/L (ref 46–116)
ALT SERPL W P-5'-P-CCNC: 27 U/L (ref 12–78)
ANION GAP SERPL CALCULATED.3IONS-SCNC: 9 MMOL/L (ref 4–13)
AST SERPL W P-5'-P-CCNC: 16 U/L (ref 5–45)
ATRIAL RATE: 70 BPM
BASOPHILS # BLD AUTO: 0.03 THOUSANDS/ΜL (ref 0–0.1)
BASOPHILS NFR BLD AUTO: 1 % (ref 0–1)
BILIRUB SERPL-MCNC: 0.23 MG/DL (ref 0.2–1)
BILIRUB UR QL STRIP: NEGATIVE
BUN SERPL-MCNC: 33 MG/DL (ref 5–25)
CALCIUM SERPL-MCNC: 9.8 MG/DL (ref 8.3–10.1)
CARDIAC TROPONIN I PNL SERPL HS: <2 NG/L
CHLORIDE SERPL-SCNC: 99 MMOL/L (ref 96–108)
CLARITY UR: CLEAR
CO2 SERPL-SCNC: 28 MMOL/L (ref 21–32)
COLOR UR: YELLOW
CREAT SERPL-MCNC: 1.91 MG/DL (ref 0.6–1.3)
EOSINOPHIL # BLD AUTO: 0.17 THOUSAND/ΜL (ref 0–0.61)
EOSINOPHIL NFR BLD AUTO: 3 % (ref 0–6)
ERYTHROCYTE [DISTWIDTH] IN BLOOD BY AUTOMATED COUNT: 13.3 % (ref 11.6–15.1)
FLUAV RNA RESP QL NAA+PROBE: NEGATIVE
FLUBV RNA RESP QL NAA+PROBE: NEGATIVE
GFR SERPL CREATININE-BSD FRML MDRD: 37 ML/MIN/1.73SQ M
GLUCOSE SERPL-MCNC: 146 MG/DL (ref 65–140)
GLUCOSE UR STRIP-MCNC: ABNORMAL MG/DL
HCT VFR BLD AUTO: 39.4 % (ref 36.5–49.3)
HGB BLD-MCNC: 13.1 G/DL (ref 12–17)
HGB UR QL STRIP.AUTO: NEGATIVE
IMM GRANULOCYTES # BLD AUTO: 0.01 THOUSAND/UL (ref 0–0.2)
IMM GRANULOCYTES NFR BLD AUTO: 0 % (ref 0–2)
KETONES UR STRIP-MCNC: NEGATIVE MG/DL
LEUKOCYTE ESTERASE UR QL STRIP: NEGATIVE
LYMPHOCYTES # BLD AUTO: 1.35 THOUSANDS/ΜL (ref 0.6–4.47)
LYMPHOCYTES NFR BLD AUTO: 24 % (ref 14–44)
MAGNESIUM SERPL-MCNC: 2.2 MG/DL (ref 1.6–2.6)
MCH RBC QN AUTO: 29.6 PG (ref 26.8–34.3)
MCHC RBC AUTO-ENTMCNC: 33.2 G/DL (ref 31.4–37.4)
MCV RBC AUTO: 89 FL (ref 82–98)
MONOCYTES # BLD AUTO: 0.57 THOUSAND/ΜL (ref 0.17–1.22)
MONOCYTES NFR BLD AUTO: 10 % (ref 4–12)
NEUTROPHILS # BLD AUTO: 3.58 THOUSANDS/ΜL (ref 1.85–7.62)
NEUTS SEG NFR BLD AUTO: 62 % (ref 43–75)
NITRITE UR QL STRIP: NEGATIVE
NRBC BLD AUTO-RTO: 0 /100 WBCS
P AXIS: 62 DEGREES
PH UR STRIP.AUTO: 5.5 [PH] (ref 4.5–8)
PLATELET # BLD AUTO: 226 THOUSANDS/UL (ref 149–390)
PMV BLD AUTO: 9.4 FL (ref 8.9–12.7)
POTASSIUM SERPL-SCNC: 4.5 MMOL/L (ref 3.5–5.3)
PR INTERVAL: 192 MS
PROT SERPL-MCNC: 8.2 G/DL (ref 6.4–8.4)
PROT UR STRIP-MCNC: NEGATIVE MG/DL
QRS AXIS: 52 DEGREES
QRSD INTERVAL: 94 MS
QT INTERVAL: 376 MS
QTC INTERVAL: 406 MS
RBC # BLD AUTO: 4.42 MILLION/UL (ref 3.88–5.62)
RSV RNA RESP QL NAA+PROBE: NEGATIVE
SARS-COV-2 RNA RESP QL NAA+PROBE: NEGATIVE
SODIUM SERPL-SCNC: 136 MMOL/L (ref 135–147)
SP GR UR STRIP.AUTO: 1.01 (ref 1–1.03)
T WAVE AXIS: 74 DEGREES
UROBILINOGEN UR QL STRIP.AUTO: 0.2 E.U./DL
VENTRICULAR RATE: 70 BPM
WBC # BLD AUTO: 5.71 THOUSAND/UL (ref 4.31–10.16)

## 2022-07-25 PROCEDURE — 93005 ELECTROCARDIOGRAM TRACING: CPT

## 2022-07-25 PROCEDURE — 93010 ELECTROCARDIOGRAM REPORT: CPT

## 2022-07-25 PROCEDURE — 84484 ASSAY OF TROPONIN QUANT: CPT | Performed by: PHYSICIAN ASSISTANT

## 2022-07-25 PROCEDURE — 80053 COMPREHEN METABOLIC PANEL: CPT | Performed by: PHYSICIAN ASSISTANT

## 2022-07-25 PROCEDURE — 0241U HB NFCT DS VIR RESP RNA 4 TRGT: CPT | Performed by: PHYSICIAN ASSISTANT

## 2022-07-25 PROCEDURE — 36415 COLL VENOUS BLD VENIPUNCTURE: CPT | Performed by: PHYSICIAN ASSISTANT

## 2022-07-25 PROCEDURE — 83735 ASSAY OF MAGNESIUM: CPT | Performed by: PHYSICIAN ASSISTANT

## 2022-07-25 PROCEDURE — 85025 COMPLETE CBC W/AUTO DIFF WBC: CPT | Performed by: PHYSICIAN ASSISTANT

## 2022-07-25 PROCEDURE — 99285 EMERGENCY DEPT VISIT HI MDM: CPT | Performed by: PHYSICIAN ASSISTANT

## 2022-07-25 PROCEDURE — 81003 URINALYSIS AUTO W/O SCOPE: CPT

## 2022-07-25 RX ORDER — LIDOCAINE 50 MG/G
1 PATCH TOPICAL ONCE
Status: DISCONTINUED | OUTPATIENT
Start: 2022-07-25 | End: 2022-07-25 | Stop reason: HOSPADM

## 2022-07-25 RX ORDER — METHOCARBAMOL 500 MG/1
500 TABLET, FILM COATED ORAL 2 TIMES DAILY
Qty: 20 TABLET | Refills: 0 | Status: SHIPPED | OUTPATIENT
Start: 2022-07-25 | End: 2022-09-19

## 2022-07-25 RX ORDER — METHOCARBAMOL 500 MG/1
500 TABLET, FILM COATED ORAL ONCE
Status: COMPLETED | OUTPATIENT
Start: 2022-07-25 | End: 2022-07-25

## 2022-07-25 RX ORDER — ACETAMINOPHEN 325 MG/1
975 TABLET ORAL ONCE
Status: COMPLETED | OUTPATIENT
Start: 2022-07-25 | End: 2022-07-25

## 2022-07-25 RX ORDER — DIAZEPAM 5 MG/1
5 TABLET ORAL ONCE
Status: COMPLETED | OUTPATIENT
Start: 2022-07-25 | End: 2022-07-25

## 2022-07-25 RX ADMIN — METHOCARBAMOL 500 MG: 500 TABLET ORAL at 03:52

## 2022-07-25 RX ADMIN — ACETAMINOPHEN 975 MG: 325 TABLET, FILM COATED ORAL at 02:23

## 2022-07-25 RX ADMIN — LIDOCAINE 1 PATCH: 50 PATCH TOPICAL at 03:52

## 2022-07-25 RX ADMIN — LIDOCAINE 1 PATCH: 50 PATCH CUTANEOUS at 04:04

## 2022-07-25 RX ADMIN — DIAZEPAM 5 MG: 5 TABLET ORAL at 02:22

## 2022-07-25 NOTE — ED PROVIDER NOTES
History  Chief Complaint   Patient presents with    Generalized Body Aches     Pt reports pain in back, legs, left arm and neck that started 3 days ago     Sabina Arellano is a 59 y o   male with PMH of hypertension and diabetes mellitus who presents to the emergency department with generalized back pain  Patient states for last 2 days he has had pains neck, upper back and low back  Does admit to some radiation to the left arm  Does not notice any traumatic injuries  Denies any headache, vision changes, blurry vision, dizziness, lightheadedness, chest pain, shortness a breath, palpitations, abdominal pain, nausea vomiting or diarrhea  No lower extremity pain swelling edema  No pain with moving legs  Denies fevers, chills, sweats  No saddle anesthesia  Full strength and sensation bilateral lower extremities  No loss of bowel or bladder function  Denies IV drug use  Denies history of cancer  Denies direct trauma  No unexpected weight loss  No long term steroid use  No pulsatile abdominal mass  No hematuria  No HIV, Transplant or systemic corticosteroids  No midline tenderness                 History provided by:  Patient   used: No    Back Pain  Location:  Generalized  Quality:  Aching, cramping and stiffness  Stiffness is present:  All day  Radiates to:  Does not radiate  Pain severity:  Moderate  Pain is:  Same all the time  Onset quality:  Gradual  Duration:  2 days  Timing:  Constant  Progression:  Unchanged  Chronicity:  Recurrent  Context: not emotional stress, not falling, not jumping from heights, not lifting heavy objects, not MCA, not MVA, not occupational injury, not pedestrian accident, not physical stress, not recent injury and not twisting    Relieved by:  Nothing  Worsened by:  Nothing  Ineffective treatments:  None tried  Associated symptoms: no abdominal pain, no abdominal swelling, no bladder incontinence, no bowel incontinence, no chest pain, no dysuria, no fever, no headaches, no leg pain, no numbness, no paresthesias, no pelvic pain, no perianal numbness, no tingling, no weakness and no weight loss        Prior to Admission Medications   Prescriptions Last Dose Informant Patient Reported? Taking? Alpha-Lipoic Acid 600 MG CAPS   Yes Yes   Sig: Take 600 mg by mouth 2 (two) times a day     Apple Cider Vinegar 300 MG TABS   Yes Yes   Sig: Take 300 mg by mouth daily     Ascorbic Acid (vitamin C) 1000 MG tablet   Yes Yes   Sig: Take 1,000 mg by mouth 2 (two) times a day   BENFOTIAMINE PO   Yes Yes   Sig: Take 300 mg by mouth 2 (two) times a day     Blood Glucose Monitoring Suppl (OneTouch Verio Sync System) w/Device KIT  Self No Yes   Sig: Use daily Test sugar daily in the morning     Empagliflozin (Jardiance) 25 MG TABS   No Yes   Sig: Take 1 tablet (25 mg total) by mouth daily   Garlic 5446 MG CAPS  Self Yes Yes   Sig: Take by mouth 2 (two) times a day    Loteprednol Etabonate 1 % SUSP Not Taking at Unknown time  Yes No   Sig: Apply to eye 2 (two) times a day I drop right eye   Patient not taking: Reported on 2022   Semaglutide,0 25 or 0 5MG/DOS, (Ozempic, 0 25 or 0 5 MG/DOSE,) 2 MG/1 5ML SOPN   No Yes   Sig: Inject 0 25 mg under the skin once a week   Turmeric (QC TUMERIC COMPLEX PO)   Yes Yes   Sig: Take 1,000 mg by mouth once Tumeric /curcimin   VITAMIN D PO   Yes Yes   Sig: Take by mouth 2 (two) times a day   Zinc 50 MG CAPS  Self Yes Yes   Sig: Take by mouth 2 (two) times a day    beta carotene 30 MG capsule  Self Yes Yes   Sig: Take 30 mg by mouth 2 (two) times a day     carvedilol (COREG) 25 mg tablet   No Yes   Sig: Take 1 tablet (25 mg total) by mouth 2 (two) times a day with meals   erythromycin (ILOTYCIN) ophthalmic ointment Not Taking at Unknown time  Yes No   Patient not taking: Reported on 2022   gentamicin (GARAMYCIN) 0 3 % ophthalmic solution Not Taking at Unknown time  Yes No   Si drop 2 (two) times a day Right eye   Patient not taking: Reported on 2022 glucose blood (OneTouch Verio) test strip  Self No Yes   Sig: Test sugar daily in the morning  lisinopril-hydrochlorothiazide (PRINZIDE,ZESTORETIC) 20-12 5 MG per tablet   No Yes   Sig: Take 1 tablet by mouth 2 (two) times a day   traMADol (Ultram) 50 mg tablet   No Yes   Sig: Take 1 tablet (50 mg total) by mouth every 6 (six) hours as needed for moderate pain for up to 30 doses   vitamin E, tocopherol, 400 units capsule  Self Yes Yes   Sig: Take 400 Units by mouth daily      Facility-Administered Medications: None       Past Medical History:   Diagnosis Date    H/O eye surgery     High blood sugar     Hypertension        Past Surgical History:   Procedure Laterality Date    HERNIA REPAIR      KIDNEY SURGERY      MOUTH SURGERY      WI AMPUTATION METATARSAL+TOE,SINGLE Left 6/1/2022    Procedure: RAY RESECTION FOOT;  Surgeon: Nahum Coombs DPM;  [facility];  Service: Podiatry    WOUND DEBRIDEMENT Left 4/28/2022    Procedure: Left foot washout;  Surgeon: Nahum Coombs DPM;  [facility];  Service: Podiatry    WOUND DEBRIDEMENT Left 6/6/2022    Procedure: DEBRIDEMENT WOUND [facility] OUT); Surgeon: Nahum Coombs DPM;  [facility];  Service: Podiatry       History reviewed  No pertinent family history  I have reviewed and agree with the history as documented  E-Cigarette/Vaping    E-Cigarette Use Never User      E-Cigarette/Vaping Substances    Nicotine No     THC No     CBD No     Flavoring No     Other No     Unknown No      Social History     Tobacco Use    Smoking status: Never Smoker    Smokeless tobacco: Never Used   Vaping Use    Vaping Use: Never used   Substance Use Topics    Alcohol use: Yes     Comment: ocassional    Drug use: Never       Review of Systems   Constitutional: Negative for activity change, appetite change, chills, fever, unexpected weight change and weight loss     HENT: Negative for congestion, ear discharge, postnasal drip, rhinorrhea, sinus pressure, sinus pain and sore throat  Respiratory: Negative for apnea, cough, choking, chest tightness, shortness of breath, wheezing and stridor  Cardiovascular: Negative for chest pain, palpitations and leg swelling  Gastrointestinal: Negative for abdominal pain, blood in stool, bowel incontinence, constipation, diarrhea, nausea and vomiting  Genitourinary: Negative for bladder incontinence, dysuria, flank pain, frequency, pelvic pain and urgency  Musculoskeletal: Positive for back pain  Negative for arthralgias, myalgias and neck stiffness  Skin: Positive for wound (From recent toe amputation on the left)  Negative for color change, pallor and rash  Neurological: Negative for dizziness, tingling, tremors, seizures, syncope, weakness, light-headedness, numbness, headaches and paresthesias  All other systems reviewed and are negative  Physical Exam  Physical Exam  Vitals and nursing note reviewed  Constitutional:       General: He is not in acute distress  Appearance: Normal appearance  He is normal weight  He is not ill-appearing or toxic-appearing  HENT:      Head: Normocephalic and atraumatic  Nose: Nose normal       Mouth/Throat:      Mouth: Mucous membranes are moist       Pharynx: Oropharynx is clear  No oropharyngeal exudate  Eyes:      Pupils: Pupils are equal, round, and reactive to light  Cardiovascular:      Rate and Rhythm: Normal rate and regular rhythm  Pulses: Normal pulses  Heart sounds: Normal heart sounds  No murmur heard  No friction rub  No gallop  Pulmonary:      Effort: Pulmonary effort is normal  No respiratory distress  Breath sounds: Normal breath sounds  No stridor  No wheezing, rhonchi or rales  Abdominal:      General: Abdomen is flat  Bowel sounds are normal  There is no distension  Palpations: Abdomen is soft  There is no mass  Tenderness: There is no abdominal tenderness  There is no guarding or rebound  Hernia: No hernia is present  Musculoskeletal:         General: No swelling or deformity  Normal range of motion  Right shoulder: Normal       Left shoulder: Normal       Cervical back: Normal range of motion  Tenderness present  No rigidity, spasms, torticollis or bony tenderness  Normal range of motion  Thoracic back: Tenderness present  No lacerations, spasms or bony tenderness  Normal range of motion  No scoliosis  Lumbar back: Tenderness present  No spasms or bony tenderness  Negative right straight leg raise test and negative left straight leg raise test  No scoliosis  Back:       Comments: Paraspinal muscle tenderness diffusely  No midline tenderness step-offs or deformities  No decreased range of motion  Negative straight leg raise  Bilateral lower extremities are neurovascularly intact  Deferred extremities neurovascular intact  Skin:     General: Skin is warm and dry  Capillary Refill: Capillary refill takes less than 2 seconds  Neurological:      General: No focal deficit present  Mental Status: He is alert and oriented to person, place, and time  Mental status is at baseline  Cranial Nerves: No cranial nerve deficit  Sensory: No sensory deficit  Motor: No weakness           Vital Signs  ED Triage Vitals [07/24/22 2251]   Temperature Pulse Respirations Blood Pressure SpO2   99 2 °F (37 3 °C) 86 18 119/76 96 %      Temp Source Heart Rate Source Patient Position - Orthostatic VS BP Location FiO2 (%)   Oral Monitor Sitting Right arm --      Pain Score       10 - Worst Possible Pain           Vitals:    07/24/22 2251 07/25/22 0142 07/25/22 0354   BP: 119/76 133/76 125/75   Pulse: 86 83 75   Patient Position - Orthostatic VS: Sitting Lying Lying         Visual Acuity      ED Medications  Medications   lidocaine (LIDODERM) 5 % patch 1 patch (1 patch Topical Medication Applied 7/25/22 0352)   lidocaine (LIDODERM) 5 % patch 1 patch (1 patch Topical Medication Applied 7/25/22 0404)   diazepam (VALIUM) tablet 5 mg (5 mg Oral Given 7/25/22 0222)   acetaminophen (TYLENOL) tablet 975 mg (975 mg Oral Given 7/25/22 0223)   methocarbamol (ROBAXIN) tablet 500 mg (500 mg Oral Given 7/25/22 0352)       Diagnostic Studies  Results Reviewed     Procedure Component Value Units Date/Time    HS Troponin I 4hr [[id number]]     Lab Status: No result Specimen: Blood     Urine Macroscopic, POC [[id number]]  (Abnormal) Collected: 07/25/22 0331    Lab Status: Final result Specimen: Urine Updated: 07/25/22 0333     Color, UA Yellow     Clarity, UA Clear     pH, UA 5 5     Leukocytes, UA Negative     Nitrite, UA Negative     Protein, UA Negative mg/dl      Glucose,  (1/2%) mg/dl      Ketones, UA Negative mg/dl      Urobilinogen, UA 0 2 E U /dl      Bilirubin, UA Negative     Occult Blood, UA Negative     Specific Gravity, UA 1 010    Narrative:      CLINITEK RESULT    COVID/FLU/RSV [[id number]]  (Normal) Collected: 07/25/22 0230    Lab Status: Final result Specimen: Nares from Nose Updated: 07/25/22 0314     SARS-CoV-2 Negative     INFLUENZA A PCR Negative     INFLUENZA B PCR Negative     RSV PCR Negative    Narrative:      FOR PEDIATRIC PATIENTS - copy/paste COVID Guidelines URL to browser: [email or web address]    SARS-CoV-2 assay is a Nucleic Acid Amplification assay intended for the  qualitative detection of nucleic acid from SARS-CoV-2 in nasopharyngeal  swabs  Results are for the presumptive identification of SARS-CoV-2 RNA  Positive results are indicative of infection with SARS-CoV-2, the virus  causing COVID-19, but do not rule out bacterial infection or co-infection  with other viruses  Laboratories within the [place] and its  territories are required to report all positive results to the appropriate  public health authorities   Negative results do not preclude SARS-CoV-2  infection and should not be used as the sole basis for treatment or other  patient management decisions  Negative results must be combined with  clinical observations, patient history, and epidemiological information  This test has not been FDA cleared or approved  This test has been authorized by FDA under an Emergency Use Authorization  (EUA)  This test is only authorized for the duration of time the  declaration that circumstances exist justifying the authorization of the  emergency use of an in vitro diagnostic tests for detection of SARS-CoV-2  virus and/or diagnosis of COVID-19 infection under section 564(b)(1) of  the Act, 21 U  S C  019FFZ-6(B)(9), unless the authorization is terminated  or revoked sooner  The test has been validated but independent review by FDA  and CLIA is pending  Test performed using [employer] GeneXpert: This RT-PCR assay targets N2,  a region unique to SARS-CoV-2  A conserved region in the E-gene was chosen  for pan-Sarbecovirus detection which includes SARS-CoV-2      Comprehensive metabolic panel [[id number]]  (Abnormal) Collected: 07/25/22 0230    Lab Status: Final result Specimen: Blood from Arm, Left Updated: 07/25/22 0307     Sodium 136 mmol/L      Potassium 4 5 mmol/L      Chloride 99 mmol/L      CO2 28 mmol/L      ANION GAP 9 mmol/L      BUN 33 mg/dL      Creatinine 1 91 mg/dL      Glucose 146 mg/dL      Calcium 9 8 mg/dL      AST 16 U/L      ALT 27 U/L      Alkaline Phosphatase 58 U/L      Total Protein 8 2 g/dL      Albumin 3 7 g/dL      Total Bilirubin 0 23 mg/dL      eGFR 37 ml/min/1 73sq m     Narrative:      Zainab guidelines for Chronic Kidney Disease (CKD):     Stage 1 with normal or high GFR (GFR > 90 mL/min/1 73 square meters)    Stage 2 Mild CKD (GFR = 60-89 mL/min/1 73 square meters)    Stage 3A Moderate CKD (GFR = 45-59 mL/min/1 73 square meters)    Stage 3B Moderate CKD (GFR = 30-44 mL/min/1 73 square meters)    Stage 4 Severe CKD (GFR = 15-29 mL/min/1 73 square meters)    Stage 5 End Stage CKD (GFR <15 mL/min/1 73 square meters)  Note: GFR calculation is accurate only with a steady state creatinine    Magnesium [[id number]]  (Normal) Collected: 07/25/22 0230    Lab Status: Final result Specimen: Blood from Arm, Left Updated: 07/25/22 0307     Magnesium 2 2 mg/dL     HS Troponin 0hr (reflex protocol) [[id number]]  (Normal) Collected: 07/25/22 0230    Lab Status: Final result Specimen: Blood from Arm, Left Updated: 07/25/22 0259     hs TnI 0hr <2 ng/L     HS Troponin I 2hr [[id number]]     Lab Status: No result Specimen: Blood     CBC and differential [[id number]] Collected: 07/25/22 0230    Lab Status: Final result Specimen: Blood from Arm, Left Updated: 07/25/22 0237     WBC 5 71 Thousand/uL      RBC 4 42 Million/uL      Hemoglobin 13 1 g/dL      Hematocrit 39 4 %      MCV 89 fL      MCH 29 6 pg      MCHC 33 2 g/dL      RDW 13 3 %      MPV 9 4 fL      Platelets 281 Thousands/uL      nRBC 0 /100 WBCs      Neutrophils Relative 62 %      Immat GRANS % 0 %      Lymphocytes Relative 24 %      Monocytes Relative 10 %      Eosinophils Relative 3 %      Basophils Relative 1 %      Neutrophils Absolute 3 58 Thousands/µL      Immature Grans Absolute 0 01 Thousand/uL      Lymphocytes Absolute 1 35 Thousands/µL      Monocytes Absolute 0 57 Thousand/µL      Eosinophils Absolute 0 17 Thousand/µL      Basophils Absolute 0 03 Thousands/µL                  No orders to display              Procedures  ECG 12 Lead Documentation Only    Date/Time: 7/25/2022 3:17 AM  Performed by: Vielka Martin PA-C  Authorized by: Vielka Martin PA-C     Indications / Diagnosis:  Left Arm Pain   ECG reviewed by me, the ED Provider: yes    Patient location:  ED  Previous ECG:     Previous ECG:  Compared to current    Comparison ECG info:  5/31/22    Similarity:  No change    Comparison to cardiac monitor: No    Interpretation:     Interpretation: normal    Rate:     ECG rate:  70    ECG rate assessment: normal    Rhythm:     Rhythm: sinus rhythm    Ectopy:     Ectopy: none    QRS:     QRS axis:  Normal    QRS intervals:  Normal  Conduction:     Conduction: normal    ST segments:     ST segments:  Normal  T waves:     T waves: normal               ED Course                                             MDM  Number of Diagnoses or Management Options  Acute exacerbation of chronic low back pain: new and requires workup  Myalgia: new and requires workup  Diagnosis management comments: Patient was seen and examined  in the emergency department for chief complaint of generalized back pain with some radiation to left arm  The patient presented symptoms for 2 days,, constant  Generalized aching  No recent trauma or other red flag back pain symptoms  On exam patient is in no acute distress, lungs are clear, generalized back tenderness  Upper and lower extremities neurovascular intact  5/5 strength and sensation intact bilateral upper lower extremities  Some mild paraspinal tenderness generally         DDx including but not limited to: sciatica, herniated disc, arthritis, spinal stenosis, strain, sprain, fracture, doubt cauda equina syndrome, epidural abscess, AAA  Workup: Will obtain basic labs, EKG, treat symptomatic  On reassessment patient symptomatically feeling better  Will discharge with Robaxin, lidocaine patch and recommend for comprehensive spine follow-up    Disposition:  General impression 67-year-old male acute exacerbation of chronic back pain  Patient also has generalized myalgias in the back  Treat symptomatically and felt better  Follow-up comprehensive spine  Return precautions discussed  The patient was discharged in stable condition  Patient ambulated off the department  Extensive return to emergency department precautions were discussed  Follow up with appropriate providers including primary care physician was discussed    Patient and/or their  primary decision maker expressed understanding  Patient remained stable during entire emergency department stay  Amount and/or Complexity of Data Reviewed  Clinical lab tests: ordered and reviewed  Tests in the medicine section of CPT®: ordered and reviewed  Review and summarize past medical records: yes  Independent visualization of images, tracings, or specimens: yes    Risk of Complications, Morbidity, and/or Mortality  Presenting problems: moderate  Diagnostic procedures: moderate  Management options: moderate    Patient Progress  Patient progress: stable      Disposition  Final diagnoses:   Myalgia   Acute exacerbation of chronic low back pain     Time reflects when diagnosis was documented in both MDM as applicable and the Disposition within this note     Time User Action Codes Description Comment    7/25/2022  3:55 AM Maudine Mater Add [M79 10] Myalgia     7/25/2022  3:55 AM Maudine Mater Add [M54 50,  G89 29] Acute exacerbation of chronic low back pain       ED Disposition     ED Disposition   Discharge    Condition   Stable    Date/Time   Mon Jul 25, 2022  3:55 AM    Comment   Yon Bell discharge to home/self care                 Follow-up Information     Follow up With Specialties Details Why Contact Info Additional [address] [facility] Emergency Department Emergency Medicine Go to  As needed, If symptoms worsen [facility] [id number]  [address] [place] [address] Emergency Department, [address] [place] [address] 200  Call  To schedule an appointment with a primary care physician [phone]       Isac Leventhal, MD Internal Medicine   [address]  [address] Floor, [address] [place] [address],[address]  [phone]       [facility] Physical Therapy Call   [phone]          Discharge Medication List as of 7/25/2022  3:56 AM      START taking these medications    Details   methocarbamol (ROBAXIN) 500 mg tablet Take 1 tablet (500 mg total) by mouth 2 (two) times a day, Starting Mon 7/25/2022, Normal         CONTINUE these medications which have NOT CHANGED    Details   Alpha-Lipoic Acid 600 MG CAPS Take 600 mg by mouth 2 (two) times a day  , Historical Med      Apple Cider Vinegar 300 MG TABS Take 300 mg by mouth daily  , Historical Med      Ascorbic Acid (vitamin C) 1000 MG tablet Take 1,000 mg by mouth 2 (two) times a day, Historical Med      BENFOTIAMINE PO Take 300 mg by mouth 2 (two) times a day  , Historical Med      beta carotene 30 MG capsule Take 30 mg by mouth 2 (two) times a day  , Historical Med      Blood Glucose Monitoring Suppl (OneTouch Verio Sync System) w/Device KIT Use daily Test sugar daily in the morning , Starting Mon 3/1/2021, Normal      carvedilol (COREG) 25 mg tablet Take 1 tablet (25 mg total) by mouth 2 (two) times a day with meals, Starting Mon 6/14/2021, Normal      Empagliflozin (Jardiance) 25 MG TABS Take 1 tablet (25 mg total) by mouth daily, Starting Tue 11/2/2021, Normal      erythromycin (ILOTYCIN) ophthalmic ointment Starting Fri 4/22/2022, Historical Med      Garlic 5088 MG CAPS Take by mouth 2 (two) times a day , Historical Med      gentamicin (GARAMYCIN) 0 3 % ophthalmic solution 1 drop 2 (two) times a day Right eye, Historical Med      glucose blood (OneTouch Verio) test strip Test sugar daily in the morning , Normal      lisinopril-hydrochlorothiazide (PRINZIDE,ZESTORETIC) 20-12 5 MG per tablet Take 1 tablet by mouth 2 (two) times a day, Starting Wed 7/14/2021, Normal      Loteprednol Etabonate 1 % SUSP Apply to eye 2 (two) times a day I drop right eye, Historical Med      Semaglutide,0 25 or 0 5MG/DOS, (Ozempic, 0 25 or 0 5 MG/DOSE,) 2 MG/1 5ML SOPN Inject 0 25 mg under the skin once a week, Starting Tue 11/2/2021, Normal      traMADol (Ultram) 50 mg tablet Take 1 tablet (50 mg total) by mouth every 6 (six) hours as needed for moderate pain for up to 30 doses, Starting Wed 6/15/2022, Normal      Turmeric (QC TUMERIC COMPLEX PO) Take 1,000 mg by mouth once Tumeric /curcimin, Historical Med      VITAMIN D PO Take by mouth 2 (two) times a day, Historical Med      vitamin E, tocopherol, 400 units capsule Take 400 Units by mouth daily, Historical Med      Zinc 50 MG CAPS Take by mouth 2 (two) times a day , Historical Med                 PDMP Review       Value Time User    PDMP Reviewed  Yes 6/15/2022  8:46 AM Sofy Phoenix MD          ED Provider  Electronically Signed by           Laura Patricio PA-C  07/25/22 7243

## 2022-07-25 NOTE — DISCHARGE INSTRUCTIONS
You were seen in the emergency department today for neck and back pain  Laboratory analysis, EKG did not reveal any acute abnormalities  Please follow-up with your primary care physician regarding your symptoms  Return to the Emergency Department sooner if increased pain, numbness, weakness, fever, vomiting, diarrhea, incontinence, difficulty walking or breathing or urinating, rash

## 2022-07-25 NOTE — TELEPHONE ENCOUNTER
Call placed to the patient per Comprehensive [facility] Program referral and VM left by the patient today @2:20 pm     After explanation of the program the patient is refusing at this time  Patient states he has done PT in the past and it has not helped  Patient was referred to [facility] physician  in the past and refuses to see him again  Patient offered option of STEVE to a different physician in the group and states they refuse to see him  Patient encouraged to reach out his PCP for other options  Patient was appreciative for the call and assistance  Referral Closed

## 2022-07-26 ENCOUNTER — PATIENT OUTREACH (OUTPATIENT)
Dept: INTERNAL MEDICINE CLINIC | Facility: CLINIC | Age: 57
End: 2022-07-26

## 2022-07-26 ENCOUNTER — LAB (OUTPATIENT)
Dept: LAB | Facility: HOSPITAL | Age: 57
End: 2022-07-26
Payer: MEDICARE

## 2022-07-26 DIAGNOSIS — E11.69 TYPE 2 DIABETES MELLITUS WITH OTHER SPECIFIED COMPLICATION, WITHOUT LONG-TERM CURRENT USE OF INSULIN (HCC): ICD-10-CM

## 2022-07-26 DIAGNOSIS — D64.9 ANEMIA, UNSPECIFIED TYPE: ICD-10-CM

## 2022-07-26 DIAGNOSIS — E53.8 FOLATE DEFICIENCY: ICD-10-CM

## 2022-07-26 DIAGNOSIS — Z13.220 SCREENING FOR HYPERLIPIDEMIA: ICD-10-CM

## 2022-07-26 DIAGNOSIS — G62.9 NEUROPATHY: ICD-10-CM

## 2022-07-26 DIAGNOSIS — E53.8 VITAMIN B 12 DEFICIENCY: ICD-10-CM

## 2022-07-26 DIAGNOSIS — Z12.5 PROSTATE CANCER SCREENING: ICD-10-CM

## 2022-07-26 DIAGNOSIS — L97.528 ULCER OF LEFT FOOT WITH OTHER SEVERITY (HCC): ICD-10-CM

## 2022-07-26 DIAGNOSIS — E61.1 IRON DEFICIENCY: ICD-10-CM

## 2022-07-26 DIAGNOSIS — I10 ESSENTIAL HYPERTENSION: ICD-10-CM

## 2022-07-26 LAB
ALBUMIN SERPL BCP-MCNC: 3.8 G/DL (ref 3.5–5)
ALP SERPL-CCNC: 60 U/L (ref 46–116)
ALT SERPL W P-5'-P-CCNC: 25 U/L (ref 12–78)
ANION GAP SERPL CALCULATED.3IONS-SCNC: 10 MMOL/L (ref 4–13)
AST SERPL W P-5'-P-CCNC: 18 U/L (ref 5–45)
BASOPHILS # BLD AUTO: 0.03 THOUSANDS/ΜL (ref 0–0.1)
BASOPHILS NFR BLD AUTO: 1 % (ref 0–1)
BILIRUB SERPL-MCNC: 0.34 MG/DL (ref 0.2–1)
BUN SERPL-MCNC: 29 MG/DL (ref 5–25)
CALCIUM SERPL-MCNC: 9.5 MG/DL (ref 8.3–10.1)
CHLORIDE SERPL-SCNC: 97 MMOL/L (ref 96–108)
CO2 SERPL-SCNC: 26 MMOL/L (ref 21–32)
CREAT SERPL-MCNC: 1.72 MG/DL (ref 0.6–1.3)
EOSINOPHIL # BLD AUTO: 0.2 THOUSAND/ΜL (ref 0–0.61)
EOSINOPHIL NFR BLD AUTO: 4 % (ref 0–6)
ERYTHROCYTE [DISTWIDTH] IN BLOOD BY AUTOMATED COUNT: 13.3 % (ref 11.6–15.1)
FOLATE SERPL-MCNC: >20 NG/ML (ref 3.1–17.5)
GFR SERPL CREATININE-BSD FRML MDRD: 42 ML/MIN/1.73SQ M
GLUCOSE P FAST SERPL-MCNC: 176 MG/DL (ref 65–99)
HCT VFR BLD AUTO: 39.5 % (ref 36.5–49.3)
HGB BLD-MCNC: 12.8 G/DL (ref 12–17)
IMM GRANULOCYTES # BLD AUTO: 0.01 THOUSAND/UL (ref 0–0.2)
IMM GRANULOCYTES NFR BLD AUTO: 0 % (ref 0–2)
IRON SERPL-MCNC: 71 UG/DL (ref 65–175)
LYMPHOCYTES # BLD AUTO: 1.22 THOUSANDS/ΜL (ref 0.6–4.47)
LYMPHOCYTES NFR BLD AUTO: 24 % (ref 14–44)
MCH RBC QN AUTO: 29.4 PG (ref 26.8–34.3)
MCHC RBC AUTO-ENTMCNC: 32.4 G/DL (ref 31.4–37.4)
MCV RBC AUTO: 91 FL (ref 82–98)
MONOCYTES # BLD AUTO: 0.46 THOUSAND/ΜL (ref 0.17–1.22)
MONOCYTES NFR BLD AUTO: 9 % (ref 4–12)
NEUTROPHILS # BLD AUTO: 3.25 THOUSANDS/ΜL (ref 1.85–7.62)
NEUTS SEG NFR BLD AUTO: 62 % (ref 43–75)
NRBC BLD AUTO-RTO: 0 /100 WBCS
PLATELET # BLD AUTO: 232 THOUSANDS/UL (ref 149–390)
PMV BLD AUTO: 9.7 FL (ref 8.9–12.7)
POTASSIUM SERPL-SCNC: 5.3 MMOL/L (ref 3.5–5.3)
PROT SERPL-MCNC: 8.3 G/DL (ref 6.4–8.4)
PSA SERPL-MCNC: 0.6 NG/ML (ref 0–4)
RBC # BLD AUTO: 4.36 MILLION/UL (ref 3.88–5.62)
SODIUM SERPL-SCNC: 133 MMOL/L (ref 135–147)
VIT B12 SERPL-MCNC: 2248 PG/ML (ref 100–900)
WBC # BLD AUTO: 5.17 THOUSAND/UL (ref 4.31–10.16)

## 2022-07-26 PROCEDURE — 83540 ASSAY OF IRON: CPT

## 2022-07-26 PROCEDURE — 82746 ASSAY OF FOLIC ACID SERUM: CPT

## 2022-07-26 PROCEDURE — 83036 HEMOGLOBIN GLYCOSYLATED A1C: CPT

## 2022-07-26 PROCEDURE — 85025 COMPLETE CBC W/AUTO DIFF WBC: CPT

## 2022-07-26 PROCEDURE — 36415 COLL VENOUS BLD VENIPUNCTURE: CPT

## 2022-07-26 PROCEDURE — 82607 VITAMIN B-12: CPT

## 2022-07-26 PROCEDURE — 80053 COMPREHEN METABOLIC PANEL: CPT

## 2022-07-26 PROCEDURE — G0103 PSA SCREENING: HCPCS

## 2022-07-26 NOTE — PROGRESS NOTES
Call placed to patient to check status of Waiver Application Process  Patient states he is doing okay but has been denied for [employer]  Does not meet criteria needs  He met with [facility]  who will put Meals on Wheels in place  Patient is still NWB per Ortho due to wound  He has cast covers so cast does not get wet, knee scooter, crutches, and cane, that he uses to get around the house  Patient has follow-up PCP appt on 7/28  Patient current with [facility] PT and RN services  Per chart review, patient not interested in [facility] or PT; states previous provider will not see him for care and patient does not want to see this provider either due to past experience  Patient was referred to [facility] and also [facility]  Patient has tried PT in the past but found it was not beneficial due to limitations and sciatica  Patient was then referred to Dr Gordan Barthel; he had to cancel appt on 7/14 and plans to call to reschedule this  Patient has PCP appt on 7/28 and plans to ask about something to help with pain relief; patient was given lidocaine patches during recent ED visit  Informed patient of [facility] (OUTPATIENT CAMP[facility]) services; he will think about this but cannot begin these services until discharged from [facility]  Provided update to [facility] and will route to Dr Apolinar Cabrera as well  No other social needs; will close case but remain available to assist with any future needs

## 2022-07-27 ENCOUNTER — TELEPHONE (OUTPATIENT)
Dept: INTERNAL MEDICINE CLINIC | Facility: CLINIC | Age: 57
End: 2022-07-27

## 2022-07-27 LAB
EST. AVERAGE GLUCOSE BLD GHB EST-MCNC: 148 MG/DL
HBA1C MFR BLD: 6.8 %

## 2022-07-27 NOTE — TELEPHONE ENCOUNTER
Home care called to let you know that they are cutting his visits down to once a week and he will be doing his own dressing changes in between  He has a really bad bed bug infestation at his home

## 2022-07-28 DIAGNOSIS — M79.605 LEFT LEG PAIN: ICD-10-CM

## 2022-07-28 RX ORDER — TRAMADOL HYDROCHLORIDE 50 MG/1
50 TABLET ORAL EVERY 6 HOURS PRN
Qty: 60 TABLET | Refills: 0 | Status: SHIPPED | OUTPATIENT
Start: 2022-07-28 | End: 2022-08-25 | Stop reason: SDUPTHER

## 2022-07-28 RX ORDER — TRAMADOL HYDROCHLORIDE 50 MG/1
50 TABLET ORAL EVERY 6 HOURS PRN
Qty: 60 TABLET | Refills: 0 | Status: CANCELLED | OUTPATIENT
Start: 2022-07-28

## 2022-07-28 NOTE — TELEPHONE ENCOUNTER
Pt was in ER over the weekend  He has back pain and they gave him 2 lidocaine patches and some robaxin  The robaxin gives him wicked nightmares so he only takes it when absolutely necessary  He has an appoint here on 8/15  He wants to know if you can give him more lidocaine patches until he gets in to see you or neurology in October? He states he is also out of tramadol if you can refill that

## 2022-07-28 NOTE — TELEPHONE ENCOUNTER
Please let the patient know that a prescription for tramadol was sent to the [facility] pharmacy  According to epic the lidocaine patches are not covered by his prescription plan I recommend that he get over-the-counter Aspercreme patches which are the same thing they are 4 % instead of 5% which is the prescription strength

## 2022-07-29 ENCOUNTER — APPOINTMENT (EMERGENCY)
Dept: RADIOLOGY | Facility: HOSPITAL | Age: 57
End: 2022-07-29
Payer: MEDICARE

## 2022-07-29 ENCOUNTER — HOSPITAL ENCOUNTER (EMERGENCY)
Facility: HOSPITAL | Age: 57
Discharge: HOME | End: 2022-07-29
Attending: EMERGENCY MEDICINE
Payer: MEDICARE

## 2022-07-29 VITALS
HEART RATE: 88 BPM | DIASTOLIC BLOOD PRESSURE: 85 MMHG | SYSTOLIC BLOOD PRESSURE: 128 MMHG | RESPIRATION RATE: 18 BRPM | TEMPERATURE: 98.4 F | OXYGEN SATURATION: 98 %

## 2022-07-29 DIAGNOSIS — M79.672 LEFT FOOT PAIN: Primary | ICD-10-CM

## 2022-07-29 DIAGNOSIS — M54.40 LOW BACK PAIN WITH SCIATICA, SCIATICA LATERALITY UNSPECIFIED, UNSPECIFIED BACK PAIN LATERALITY, UNSPECIFIED CHRONICITY: ICD-10-CM

## 2022-07-29 PROCEDURE — 99283 EMERGENCY DEPT VISIT LOW MDM: CPT

## 2022-07-29 PROCEDURE — 73630 X-RAY EXAM OF FOOT: CPT

## 2022-07-29 PROCEDURE — 99284 EMERGENCY DEPT VISIT MOD MDM: CPT

## 2022-07-29 RX ORDER — LIDOCAINE 50 MG/G
1 PATCH TOPICAL DAILY
Qty: 10 PATCH | Refills: 0 | Status: SHIPPED | OUTPATIENT
Start: 2022-07-29

## 2022-08-01 ENCOUNTER — PATIENT OUTREACH (OUTPATIENT)
Dept: INTERNAL MEDICINE CLINIC | Facility: CLINIC | Age: 57
End: 2022-08-01

## 2022-08-02 ENCOUNTER — RA CDI HCC (OUTPATIENT)
Dept: OTHER | Facility: HOSPITAL | Age: 57
End: 2022-08-02

## 2022-08-02 NOTE — PROGRESS NOTES
E11 42  [facility]  coding opportunities          Chart Reviewed number of suggestions sent to Provider: 1     Patients Insurance     Medicare Insurance: [employer]

## 2022-08-15 ENCOUNTER — OFFICE VISIT (OUTPATIENT)
Dept: INTERNAL MEDICINE CLINIC | Facility: CLINIC | Age: 57
End: 2022-08-15
Payer: MEDICARE

## 2022-08-15 VITALS
SYSTOLIC BLOOD PRESSURE: 150 MMHG | DIASTOLIC BLOOD PRESSURE: 90 MMHG | HEART RATE: 72 BPM | RESPIRATION RATE: 16 BRPM | OXYGEN SATURATION: 97 % | HEIGHT: 72 IN | TEMPERATURE: 98 F | WEIGHT: 266 LBS | BODY MASS INDEX: 36.03 KG/M2

## 2022-08-15 DIAGNOSIS — E11.69 TYPE 2 DIABETES MELLITUS WITH OTHER SPECIFIED COMPLICATION, WITHOUT LONG-TERM CURRENT USE OF INSULIN (HCC): ICD-10-CM

## 2022-08-15 DIAGNOSIS — M54.16 LUMBAR RADICULOPATHY: ICD-10-CM

## 2022-08-15 DIAGNOSIS — N18.32 STAGE 3B CHRONIC KIDNEY DISEASE (HCC): ICD-10-CM

## 2022-08-15 DIAGNOSIS — G62.9 NEUROPATHY: ICD-10-CM

## 2022-08-15 DIAGNOSIS — I10 ESSENTIAL HYPERTENSION: Primary | ICD-10-CM

## 2022-08-15 DIAGNOSIS — M86.9 OSTEOMYELITIS OF LEFT FOOT, UNSPECIFIED TYPE (HCC): ICD-10-CM

## 2022-08-15 PROCEDURE — 99215 OFFICE O/P EST HI 40 MIN: CPT | Performed by: INTERNAL MEDICINE

## 2022-08-15 RX ORDER — CARVEDILOL 25 MG/1
25 TABLET ORAL 2 TIMES DAILY WITH MEALS
Qty: 180 TABLET | Refills: 3 | Status: SHIPPED | OUTPATIENT
Start: 2022-08-15

## 2022-08-15 RX ORDER — LISINOPRIL AND HYDROCHLOROTHIAZIDE 20; 12.5 MG/1; MG/1
1 TABLET ORAL 2 TIMES DAILY
Qty: 180 TABLET | Refills: 3 | Status: SHIPPED | OUTPATIENT
Start: 2022-08-15

## 2022-08-16 PROBLEM — M86.9 OSTEOMYELITIS OF LEFT FOOT (HCC): Status: ACTIVE | Noted: 2022-08-16
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
## 2022-08-16 PROBLEM — L97.509 DIABETIC FOOT ULCER (HCC): Status: RESOLVED | Noted: 2022-04-08 | Resolved: 2022-08-16

## 2022-08-16 PROBLEM — L03.90 CELLULITIS: Status: RESOLVED | Noted: 2022-02-22 | Resolved: 2022-08-16

## 2022-08-16 PROBLEM — Z98.890 H/O EYE SURGERY: Status: RESOLVED | Noted: 2022-04-27 | Resolved: 2022-08-16

## 2022-08-16 PROBLEM — N17.9 ACUTE KIDNEY INJURY SUPERIMPOSED ON CHRONIC KIDNEY DISEASE (HCC): Status: RESOLVED | Noted: 2022-05-31 | Resolved: 2022-08-16

## 2022-08-16 PROBLEM — N18.9 ACUTE KIDNEY INJURY SUPERIMPOSED ON CHRONIC KIDNEY DISEASE (HCC): Status: RESOLVED | Noted: 2022-05-31 | Resolved: 2022-08-16

## 2022-08-16 PROBLEM — M79.18 MYOFASCIAL PAIN: Status: RESOLVED | Noted: 2021-03-26 | Resolved: 2022-08-16

## 2022-08-16 PROBLEM — S41.151S DOG BITE OF ARM, RIGHT, SEQUELA: Status: RESOLVED | Noted: 2022-02-23 | Resolved: 2022-08-16

## 2022-08-16 PROBLEM — E11.621 DIABETIC FOOT ULCER (HCC): Status: RESOLVED | Noted: 2022-04-08 | Resolved: 2022-08-16

## 2022-08-16 NOTE — ASSESSMENT & PLAN NOTE
Neuropathy suggestive of a peripheral neuropathy secondary to diabetes reviewed with the patient today he has previously been tried on gabapentin without success    Recommend continued efforts to maintain good stable control diabetes

## 2022-08-16 NOTE — ASSESSMENT & PLAN NOTE
Osteomyelitis of the left foot status post surgical resection of the 5th metatarsal   Wound is healing nicely no indication of infection new dressing applied to the foot follow-up with podiatry recommended

## 2022-08-16 NOTE — ASSESSMENT & PLAN NOTE
Lumbar radiculopathy pain symptoms discussed with the patient today  Cannot use nonsteroidal anti-inflammatories due to his chronic kidney disease recommend tramadol 1 tablet twice a day

## 2022-08-16 NOTE — PROGRESS NOTES
Assessment/Plan:    Diabetes mellitus ([facility] [address] )  A review of the patient's hemoglobin A1c indicates a reading of 6 8% confirming adequate control of his type 2 diabetes  He continues under the management of [facility] endocrinology services  He is currently on a combination of Ozempic and Jardiance for control of his type 2 diabetes  He has had no symptoms or signs of hypoglycemia recommend continued healthy balance diabetic diet and continued efforts to reduce body weight  Lab Results   Component Value Date    HGBA1C 6 8 (H) 07/26/2022       Essential hypertension  Blood pressure assessment today shows a blood pressure reading elevated at 100 50/90  The patient indicates that he ran out of his blood pressure medications both lisinopril and carvedilol  New prescription for both medications were sent to the patient's pharmacy and I have urged him to get the medications today and take a daily dose of each    Neuropathy  Neuropathy suggestive of a peripheral neuropathy secondary to diabetes reviewed with the patient today he has previously been tried on gabapentin without success  Recommend continued efforts to maintain good stable control diabetes    Lumbar radiculopathy  Lumbar radiculopathy pain symptoms discussed with the patient today  Cannot use nonsteroidal anti-inflammatories due to his chronic kidney disease recommend tramadol 1 tablet twice a day  Osteomyelitis of left foot (HCC)  Osteomyelitis of the left foot status post surgical resection of the 5th metatarsal   Wound is healing nicely no indication of infection new dressing applied to the foot follow-up with podiatry recommended    Chronic kidney disease, stage 3 ([facility] )  Lab Results   Component Value Date    EGFR 42 07/26/2022    EGFR 37 07/25/2022    EGFR 57 06/08/2022    CREATININE 1 72 (H) 07/26/2022    CREATININE 1 91 (H) 07/25/2022    CREATININE 1 35 (H) 06/08/2022   Chronic kidney disease with stage 3B disease    Slight improvement in kidney performance on most recent blood work  Maintain blood pressure control avoid dehydration and avoid nonsteroidal anti-inflammatories  Diagnoses and all orders for this visit:    Stage 3b chronic kidney disease ([facility] [address] )    Essential hypertension  -     lisinopril-hydrochlorothiazide (PRINZIDE,ZESTORETIC) 20-12 5 MG per tablet; Take 1 tablet by mouth 2 (two) times a day  -     carvedilol (COREG) 25 mg tablet; Take 1 tablet (25 mg total) by mouth 2 (two) times a day with meals    Neuropathy    Type 2 diabetes mellitus with other specified complication, without long-term current use of insulin (HCC)    Osteomyelitis of left foot, unspecified type (HCC)    Lumbar radiculopathy        Subjective:      Patient ID: Sabina Arellano is a 59 y o  male  This 38-year-old gentleman presents today for a routine follow-up visit  He is status post left 5th metatarsal resection for osteo myelitis  His wound continues to heal nicely with no signs or symptoms of persistent infection  Patient does have a history of chronic neuropathy pain suggestive of peripheral neuropathy also has a history of lumbar radiculopathy pain  He indicates that the pain has been persistent  He has tried previously gabapentin without success at relieving his pain  He has been successful at using tramadol 1 pill twice a day for control of his pain  Patient has type 2 diabetes without any evidence of recent hyperglycemia or hypoglycemia  Recent blood work pertaining to his diabetic control was reviewed with the patient during today's visit  Patient also is noted to have chronic stage IIIB kidney disease of most recent blood work pertaining to his kidney performance was reviewed during today's visit  The following portions of the patient's history were reviewed and updated as appropriate:   He  has a past medical history of H/O eye surgery, High blood sugar, and Hypertension    He   Patient Active Problem List    Diagnosis Date Noted    Osteomyelitis of left foot ([facility] ) 08/16/2022    Diabetic ulcer of left foot ([place] [address] ) 05/31/2022    Chronic kidney disease, stage 3 ([place] [address] ) 02/22/2022    Erectile dysfunction 11/02/2021    Neuropathy 06/17/2021    Low back pain with sciatica 03/26/2021    Neck pain 03/26/2021    Lumbar radiculopathy 03/26/2021    Cervical radiculopathy 03/26/2021    Obesity 03/23/2021    Essential hypertension     Diabetes mellitus ([place] [address] )      He  has a past surgical history that includes Hernia repair; Kidney surgery; Mouth surgery; Wound debridement (Left, 4/28/2022); pr amputation metatarsal+toe,single (Left, 6/1/2022); and Wound debridement (Left, 6/6/2022)  His family history is not on file  He  reports that he has never smoked  He has never used smokeless tobacco  He reports current alcohol use  He reports that he does not use drugs  Current Outpatient Medications   Medication Sig Dispense Refill    carvedilol (COREG) 25 mg tablet Take 1 tablet (25 mg total) by mouth 2 (two) times a day with meals 180 tablet 3    lisinopril-hydrochlorothiazide (PRINZIDE,ZESTORETIC) 20-12 5 MG per tablet Take 1 tablet by mouth 2 (two) times a day 180 tablet 3    Alpha-Lipoic Acid 600 MG CAPS Take 600 mg by mouth 2 (two) times a day        Apple Cider Vinegar 300 MG TABS Take 300 mg by mouth daily        Ascorbic Acid (vitamin C) 1000 MG tablet Take 1,000 mg by mouth 2 (two) times a day      BENFOTIAMINE PO Take 300 mg by mouth 2 (two) times a day        beta carotene 30 MG capsule Take 30 mg by mouth 2 (two) times a day        Blood Glucose Monitoring Suppl (OneTouch Verio Sync System) w/Device KIT Use daily Test sugar daily in the morning  1 kit 0    Empagliflozin (Jardiance) 25 MG TABS Take 1 tablet (25 mg total) by mouth daily 90 tablet 3    Garlic 8825 MG CAPS Take by mouth 2 (two) times a day       glucose blood (OneTouch Verio) test strip Test sugar daily in the morning   100 each 3    lidocaine (Lidoderm) 5 % Apply 1 patch topically daily Remove & Discard patch within 12 hours or as directed by MD 10 patch 0    methocarbamol (ROBAXIN) 500 mg tablet Take 1 tablet (500 mg total) by mouth 2 (two) times a day 20 tablet 0    Semaglutide,0 25 or 0 5MG/DOS, (Ozempic, 0 25 or 0 5 MG/DOSE,) 2 MG/1 5ML SOPN Inject 0 25 mg under the skin once a week 15 mL 3    traMADol (Ultram) 50 mg tablet Take 1 tablet (50 mg total) by mouth every 6 (six) hours as needed for moderate pain for up to 30 doses 60 tablet 0    Turmeric (QC TUMERIC COMPLEX PO) Take 1,000 mg by mouth once Tumeric /curcimin      VITAMIN D PO Take by mouth 2 (two) times a day      vitamin E, tocopherol, 400 units capsule Take 400 Units by mouth daily      Zinc 50 MG CAPS Take by mouth 2 (two) times a day        No current facility-administered medications for this visit       Review of Systems   Musculoskeletal: Positive for back pain and gait problem  Neurological:        Peripheral neuropathy symptoms in both lower extremities as well as lumbar radiculopathy   All other systems reviewed and are negative  Objective:      /90   Pulse 72   Temp 98 °F (36 7 °C)   Resp 16   Ht 6' (1 829 m)   Wt 121 kg (266 lb)   SpO2 97%   BMI 36 08 kg/m²          Physical Exam  Constitutional:       General: He is not in acute distress  Appearance: He is well-developed  He is not ill-appearing  HENT:      Right Ear: Hearing and external ear normal       Left Ear: Hearing and external ear normal       Nose: Nose normal    Eyes:      Conjunctiva/sclera: Conjunctivae normal       Pupils: Pupils are equal, round, and reactive to light  Neck:      Thyroid: No thyromegaly  Cardiovascular:      Rate and Rhythm: Normal rate and regular rhythm  Heart sounds: Normal heart sounds, S1 normal and S2 normal  No murmur heard  Pulmonary:      Effort: Pulmonary effort is normal       Breath sounds: Normal breath sounds  No wheezing, rhonchi or rales     Abdominal: General: Bowel sounds are normal       Palpations: Abdomen is soft  Musculoskeletal:         General: Normal range of motion  Lymphadenopathy:      Cervical: No cervical adenopathy  Skin:     General: Skin is warm and dry  Comments: Examination the patient's left foot wound indicates good healing process fresh dressing applied to the area of his surgical wound no evidence of infection at this time   Neurological:      Mental Status: He is alert and oriented to person, place, and time  Deep Tendon Reflexes: Reflexes are normal and symmetric  Comments: Diminished sensation in both lower extremities   Psychiatric:         Behavior: Behavior normal          Thought Content:  Thought content normal          Judgment: Judgment normal

## 2022-08-16 NOTE — ASSESSMENT & PLAN NOTE
A review of the patient's hemoglobin A1c indicates a reading of 6 8% confirming adequate control of his type 2 diabetes  He continues under the management of [facility] endocrinology services  He is currently on a combination of Ozempic and Jardiance for control of his type 2 diabetes    He has had no symptoms or signs of hypoglycemia recommend continued healthy balance diabetic diet and continued efforts to reduce body weight  Lab Results   Component Value Date    HGBA1C 6 8 (H) 07/26/2022

## 2022-08-16 NOTE — ASSESSMENT & PLAN NOTE
Blood pressure assessment today shows a blood pressure reading elevated at 100 50/90  The patient indicates that he ran out of his blood pressure medications both lisinopril and carvedilol    New prescription for both medications were sent to the patient's pharmacy and I have urged him to get the medications today and take a daily dose of each

## 2022-08-16 NOTE — ASSESSMENT & PLAN NOTE
Lab Results   Component Value Date    EGFR 42 07/26/2022    EGFR 37 07/25/2022    EGFR 57 06/08/2022    CREATININE 1 72 (H) 07/26/2022    CREATININE 1 91 (H) 07/25/2022    CREATININE 1 35 (H) 06/08/2022   Chronic kidney disease with stage 3B disease  Slight improvement in kidney performance on most recent blood work  Maintain blood pressure control avoid dehydration and avoid nonsteroidal anti-inflammatories

## 2022-08-25 DIAGNOSIS — M79.605 LEFT LEG PAIN: ICD-10-CM

## 2022-08-25 RX ORDER — TRAMADOL HYDROCHLORIDE 50 MG/1
50 TABLET ORAL EVERY 6 HOURS PRN
Qty: 60 TABLET | Refills: 0 | Status: SHIPPED | OUTPATIENT
Start: 2022-08-25 | End: 2022-09-19 | Stop reason: SDUPTHER

## 2022-08-26 NOTE — PLAN OF CARE
----- Message from Cam Burciaga Cam Burciaga MD       Problem: Potential for Falls  Goal: Patient will remain free of falls  Description: INTERVENTIONS:  - Educate patient/family on patient safety including physical limitations  - Instruct patient to call for assistance with activity   - Consult OT/PT to assist with strengthening/mobility   - Keep Call bell within reach  - Keep bed low and locked with side rails adjusted as appropriate  - Keep care items and personal belongings within reach  - Initiate and maintain comfort rounds  - Make Fall Risk Sign visible to staff  - Offer Toileting every 2 Hours, in advance of need  - Initiate/Maintain   alarm  - Obtain necessary fall risk management equipment:     - Apply yellow socks and bracelet for high fall risk patients  - Consider moving patient to room near nurses station  Outcome: Progressing     Problem: INFECTION - ADULT  Goal: Absence or prevention of progression during hospitalization  Description: INTERVENTIONS:  - Assess and monitor for signs and symptoms of infection  - Monitor lab/diagnostic results  - Monitor all insertion sites, i e  indwelling lines, tubes, and drains  - Monitor endotracheal if appropriate and nasal secretions for changes in amount and color  - [place] appropriate cooling/warming therapies per order  - Administer medications as ordered  - Instruct and encourage patient and family to use good hand hygiene technique  - Identify and instruct in appropriate isolation precautions for identified infection/condition  Outcome: Progressing  Goal: Absence of fever/infection during neutropenic period  Description: INTERVENTIONS:  - Monitor WBC    Outcome: Progressing

## 2022-09-08 ENCOUNTER — RA CDI HCC (OUTPATIENT)
Dept: OTHER | Facility: HOSPITAL | Age: 57
End: 2022-09-08

## 2022-09-08 ENCOUNTER — TELEPHONE (OUTPATIENT)
Dept: ENDOCRINOLOGY | Facility: CLINIC | Age: 57
End: 2022-09-08

## 2022-09-08 NOTE — PROGRESS NOTES
e11 42  [facility] 75  coding opportunities          Chart Reviewed number of suggestions sent to Provider: 1     Patients Insurance     Medicare Insurance: [employer]

## 2022-09-09 ENCOUNTER — OFFICE VISIT (OUTPATIENT)
Dept: ENDOCRINOLOGY | Facility: CLINIC | Age: 57
End: 2022-09-09
Payer: MEDICARE

## 2022-09-09 VITALS
DIASTOLIC BLOOD PRESSURE: 76 MMHG | BODY MASS INDEX: 35.81 KG/M2 | WEIGHT: 264.4 LBS | SYSTOLIC BLOOD PRESSURE: 138 MMHG | HEIGHT: 72 IN | HEART RATE: 80 BPM

## 2022-09-09 DIAGNOSIS — G62.9 NEUROPATHY: ICD-10-CM

## 2022-09-09 DIAGNOSIS — Z86.31 H/O DIABETIC FOOT ULCER: ICD-10-CM

## 2022-09-09 DIAGNOSIS — E11.69 TYPE 2 DIABETES MELLITUS WITH OTHER SPECIFIED COMPLICATION, WITHOUT LONG-TERM CURRENT USE OF INSULIN (HCC): Primary | ICD-10-CM

## 2022-09-09 DIAGNOSIS — N18.32 STAGE 3B CHRONIC KIDNEY DISEASE (HCC): ICD-10-CM

## 2022-09-09 DIAGNOSIS — I10 ESSENTIAL HYPERTENSION: ICD-10-CM

## 2022-09-09 DIAGNOSIS — E78.5 DYSLIPIDEMIA: ICD-10-CM

## 2022-09-09 DIAGNOSIS — E66.01 CLASS 2 SEVERE OBESITY WITH SERIOUS COMORBIDITY AND BODY MASS INDEX (BMI) OF 35.0 TO 35.9 IN ADULT, UNSPECIFIED OBESITY TYPE (HCC): ICD-10-CM

## 2022-09-09 PROCEDURE — 99214 OFFICE O/P EST MOD 30 MIN: CPT | Performed by: INTERNAL MEDICINE

## 2022-09-09 RX ORDER — SEMAGLUTIDE 1.34 MG/ML
0.25 INJECTION, SOLUTION SUBCUTANEOUS WEEKLY
Qty: 15 ML | Refills: 3 | Status: SHIPPED | OUTPATIENT
Start: 2022-09-09

## 2022-09-09 RX ORDER — BLOOD SUGAR DIAGNOSTIC
STRIP MISCELLANEOUS
Qty: 100 EACH | Refills: 3 | Status: SHIPPED | OUTPATIENT
Start: 2022-09-09 | End: 2022-09-12 | Stop reason: CLARIF

## 2022-09-09 NOTE — PROGRESS NOTES
Tamar Valencia 59 y o  male MRN: [id number]    Encounter: [id number]      Assessment/Plan     1  Type 2 diabetes mellitus not on long term insulin therapy   2  Peripheral neuropathy, history of diabetic foot ulcer, amputation   3  Obesity   4  CKD   Well controlled based Last A1c 6 8%  Discussed potentially increasing dose of Ozempic which could provide a greater weight loss potential   Patient would like to hold off at this time, continue current dose of medications     Recommend the following at this time  Continue current regimen   Started her blood sugars before meals and at bedtime   Follow-up in 3 months with repeat labs    5  Dyslipidemia  Not on statin therapy  Recommend for primary prevention of cardiovascular, cerebrovascular disease    6  Hypertension  Blood pressure at goal  - continue current medications including ACE-I/ ARB      CC: Diabetes    History of Present Illness     HPI:  Tamar Valencia is a 59 y o  male presents for a follow-up visit regarding diabetes management  Also has CKD hypertension, neuropathy, known diabetic foot ulcers s/p left 5th ray amputation (5/2022), obesity     Last seen in 4/2022  Last Eye exam:  Following up regularly, last 2 months ago; No DR    Current regimen:   Ozempic 0 25 mg subcutaneously weekly   Jardiance 25 mg orally daily    Following up with podiatry; completed physical therapy   Has some difficulty walking since the surgery but hoping it will get better with time and diabetic shoes   Appetite is good, weight stable   Starting to be more physically active   Has pain in the right eye since cataract surgery; vision is good   No CP/SOB   No other complaints     Statin:  --   ACE-I/ARB:  lisinopril     Home glucose monitoring: on recall   Before breakfast: 140s  Before lunch:  --   Before dinner:  140s  Bedtime:  --   Symptoms of hypoglycemia :  No lows recently that the patient is aware of       All other systems were reviewed and are negative      Review of Systems      Historical Information   Past Medical History:   Diagnosis Date    H/O eye surgery     High blood sugar     Hypertension      Past Surgical History:   Procedure Laterality Date    HERNIA REPAIR      KIDNEY SURGERY      MOUTH SURGERY      WA AMPUTATION METATARSAL+TOE,SINGLE Left 6/1/2022    Procedure: RAY RESECTION FOOT;  Surgeon: Dnoya Adler DPM;  Location: [facility];  Service: Podiatry    WOUND DEBRIDEMENT Left 4/28/2022    Procedure: Left foot washout;  Surgeon: Donya Adler DPM;  [facility];  Service: Podiatry    WOUND DEBRIDEMENT Left 6/6/2022    Procedure: DEBRIDEMENT WOUND [facility] OUT); Surgeon: Donya Adler DPM;  Location[facility];  Service: Podiatry     Social History   Social History     Substance and Sexual Activity   Alcohol Use Yes    Comment: ocassional     Social History     Substance and Sexual Activity   Drug Use Never     Social History     Tobacco Use   Smoking Status Never Smoker   Smokeless Tobacco Never Used     Family History: History reviewed  No pertinent family history  Meds/Allergies   Current Outpatient Medications   Medication Sig Dispense Refill    Alpha-Lipoic Acid 600 MG CAPS Take 600 mg by mouth 2 (two) times a day        Apple Cider Vinegar 300 MG TABS Take 300 mg by mouth daily        Ascorbic Acid (vitamin C) 1000 MG tablet Take 1,000 mg by mouth 2 (two) times a day      BENFOTIAMINE PO Take 900 mg by mouth 2 (two) times a day      beta carotene 30 MG capsule Take 30 mg by mouth 2 (two) times a day        Blood Glucose Monitoring Suppl (OneTouch Verio Sync System) w/Device KIT Use daily Test sugar daily in the morning   1 kit 0    carvedilol (COREG) 25 mg tablet Take 1 tablet (25 mg total) by mouth 2 (two) times a day with meals 180 tablet 3    Empagliflozin (Jardiance) 25 MG TABS Take 1 tablet (25 mg total) by mouth daily 90 tablet 3    Garlic 4458 MG CAPS Take by mouth 2 (two) times a day       glucose blood (OneTouch Verio) test strip Test sugar daily in the morning  100 each 3    lidocaine (Lidoderm) 5 % Apply 1 patch topically daily Remove & Discard patch within 12 hours or as directed by MD 10 patch 0    lisinopril-hydrochlorothiazide (PRINZIDE,ZESTORETIC) 20-12 5 MG per tablet Take 1 tablet by mouth 2 (two) times a day 180 tablet 3    Semaglutide,0 25 or 0 5MG/DOS, (Ozempic, 0 25 or 0 5 MG/DOSE,) 2 MG/1 5ML SOPN Inject 0 25 mg under the skin once a week 15 mL 3    traMADol (Ultram) 50 mg tablet Take 1 tablet (50 mg total) by mouth every 6 (six) hours as needed for moderate pain for up to 30 doses (Patient taking differently: Take 50 mg by mouth 2 tablets in evening) 60 tablet 0    Turmeric (QC TUMERIC COMPLEX PO) Take 1,000 mg by mouth once Tumeric /curcimin      VITAMIN D PO Take by mouth 2 (two) times a day      vitamin E, tocopherol, 400 units capsule Take 400 Units by mouth 2 (two) times a day      Zinc 50 MG CAPS Take by mouth 2 (two) times a day       methocarbamol (ROBAXIN) 500 mg tablet Take 1 tablet (500 mg total) by mouth 2 (two) times a day 20 tablet 0     No current facility-administered medications for this visit  Allergies   Allergen Reactions    Cephalexin Hives     Skin peeling  Tolerates penicillins (tolerated unasyn and zosyn)    Metformin GI Intolerance       Objective   Vitals: Blood pressure 138/76, pulse 80, height 6' (1 829 m), weight 120 kg (264 lb 6 4 oz)  Physical Exam  Constitutional:       General: He is not in acute distress  Appearance: He is well-developed  He is not diaphoretic  HENT:      Head: Normocephalic and atraumatic  Eyes:      Conjunctiva/sclera: Conjunctivae normal       Pupils: Pupils are equal, round, and reactive to light  Cardiovascular:      Rate and Rhythm: Normal rate and regular rhythm  Heart sounds: Normal heart sounds  No murmur heard  Pulmonary:      Effort: Pulmonary effort is normal  No respiratory distress        Breath sounds: Normal breath sounds  No wheezing  Abdominal:      General: There is no distension  Palpations: Abdomen is soft  Tenderness: There is no abdominal tenderness  There is no guarding  Musculoskeletal:      Cervical back: Normal range of motion and neck supple  Comments: Left 5 th ray amputation    Skin:     General: Skin is warm and dry  Findings: No erythema or rash  Neurological:      Mental Status: He is alert and oriented to person, place, and time  Psychiatric:         Behavior: Behavior normal          Thought Content: Thought content normal          The history was obtained from the review of the chart, patient      Lab Results:   Lab Results   Component Value Date/Time    Hemoglobin A1C 6 8 (H) 07/26/2022 10:37 AM    Hemoglobin A1C 6 6 (H) 04/26/2022 04:15 AM    Hemoglobin A1C 6 4 (H) 11/11/2021 10:47 AM    WBC 5 17 07/26/2022 10:37 AM    WBC 5 71 07/25/2022 02:30 AM    WBC 5 34 06/09/2022 05:31 AM    Hemoglobin 12 8 07/26/2022 10:37 AM    Hemoglobin 13 1 07/25/2022 02:30 AM    Hemoglobin 10 8 (L) 06/09/2022 05:31 AM    Hematocrit 39 5 07/26/2022 10:37 AM    Hematocrit 39 4 07/25/2022 02:30 AM    Hematocrit 32 7 (L) 06/09/2022 05:31 AM    MCV 91 07/26/2022 10:37 AM    MCV 89 07/25/2022 02:30 AM    MCV 92 06/09/2022 05:31 AM    Platelets 326 62/75/2145 10:37 AM    Platelets 181 29/86/4086 02:30 AM    Platelets 791 45/99/9401 05:31 AM    BUN 29 (H) 07/26/2022 10:37 AM    BUN 33 (H) 07/25/2022 02:30 AM    BUN 25 06/08/2022 05:56 AM    Potassium 5 3 07/26/2022 10:37 AM    Potassium 4 5 07/25/2022 02:30 AM    Potassium 4 4 06/08/2022 05:56 AM    Chloride 97 07/26/2022 10:37 AM    Chloride 99 07/25/2022 02:30 AM    Chloride 104 06/08/2022 05:56 AM    CO2 26 07/26/2022 10:37 AM    CO2 28 07/25/2022 02:30 AM    CO2 23 06/08/2022 05:56 AM    Creatinine 1 72 (H) 07/26/2022 10:37 AM    Creatinine 1 91 (H) 07/25/2022 02:30 AM    Creatinine 1 35 (H) 06/08/2022 05:56 AM    AST 18 07/26/2022 10:37 AM    AST 16 07/25/2022 02:30 AM    AST 13 06/05/2022 05:01 AM    ALT 25 07/26/2022 10:37 AM    ALT 27 07/25/2022 02:30 AM    ALT 19 06/05/2022 05:01 AM    Albumin 3 8 07/26/2022 10:37 AM    Albumin 3 7 07/25/2022 02:30 AM    Albumin 2 8 (L) 06/05/2022 05:01 AM    HDL, Direct 35 (L) 07/26/2022 10:37 AM    HDL, Direct 36 (L) 11/11/2021 10:47 AM    Triglycerides 130 07/26/2022 10:37 AM    Triglycerides 80 11/11/2021 10:47 AM         Imaging Studies: I have personally reviewed pertinent reports  Portions of the record may have been created with voice recognition software  Occasional wrong word or "sound a like" substitutions may have occurred due to the inherent limitations of voice recognition software  Read the chart carefully and recognize, using context, where substitutions have occurred

## 2022-09-09 NOTE — PATIENT INSTRUCTIONS
Continue current regimen   Recommend stagger checking blood sugars before meals and at bedtime, okay to check 1 to 2 times a day   Please let me know if there are any persistent high or low blood sugars   Follow-up in 3-4 months with repeat labs  Keep well hydrated

## 2022-09-12 DIAGNOSIS — E11.69 TYPE 2 DIABETES MELLITUS WITH OTHER SPECIFIED COMPLICATION, WITHOUT LONG-TERM CURRENT USE OF INSULIN (HCC): Primary | ICD-10-CM

## 2022-09-12 RX ORDER — PERPHENAZINE 16 MG/1
TABLET, FILM COATED ORAL
Qty: 100 STRIP | Refills: 3 | Status: SHIPPED | OUTPATIENT
Start: 2022-09-12

## 2022-09-15 ENCOUNTER — TELEPHONE (OUTPATIENT)
Dept: ADMINISTRATIVE | Facility: OTHER | Age: 57
End: 2022-09-15

## 2022-09-15 NOTE — TELEPHONE ENCOUNTER
----- Message from Prisca Guerrero MA sent at 9/14/2022  2:27 PM EDT -----  Regarding: FOOT EXAM  09/14/22 2:27 PM    Hello, our patient Yoshi Hurley has had FOOT EXAM  completed/performed   Please assist in updating the patient chart by MEDIA The date of service is 09/02/2022    Thank you,  Prisca Guerrero MA  [facility] INTERNAL MED

## 2022-09-15 NOTE — TELEPHONE ENCOUNTER
Upon review of the In Basket request we were able to locate, review, and update the patient chart as requested for Diabetic Foot Exam     Any additional questions or concerns should be emailed to the Practice Liaisons via [email or web address] email, please do not reply via In Basket      Thank you  Kimberly Waters MA

## 2022-09-15 NOTE — TELEPHONE ENCOUNTER
This Diabetic Foot Exam DOS 9-2-2022 cannot be submitted to the insurance value based payor portal

## 2022-09-16 ENCOUNTER — APPOINTMENT (OUTPATIENT)
Dept: LAB | Facility: HOSPITAL | Age: 57
End: 2022-09-16
Payer: MEDICARE

## 2022-09-16 DIAGNOSIS — E53.8 VITAMIN B 12 DEFICIENCY: ICD-10-CM

## 2022-09-16 DIAGNOSIS — E11.69 TYPE 2 DIABETES MELLITUS WITH OTHER SPECIFIED COMPLICATION, WITHOUT LONG-TERM CURRENT USE OF INSULIN (HCC): ICD-10-CM

## 2022-09-16 DIAGNOSIS — E53.8 FOLATE DEFICIENCY: ICD-10-CM

## 2022-09-16 DIAGNOSIS — Z12.5 PROSTATE CANCER SCREENING: ICD-10-CM

## 2022-09-16 DIAGNOSIS — D64.9 ANEMIA, UNSPECIFIED TYPE: ICD-10-CM

## 2022-09-16 DIAGNOSIS — E61.1 IRON DEFICIENCY: ICD-10-CM

## 2022-09-16 DIAGNOSIS — I10 ESSENTIAL HYPERTENSION: ICD-10-CM

## 2022-09-16 DIAGNOSIS — Z13.220 SCREENING FOR HYPERLIPIDEMIA: ICD-10-CM

## 2022-09-16 LAB
ALBUMIN SERPL BCP-MCNC: 4.1 G/DL (ref 3.5–5)
ALP SERPL-CCNC: 47 U/L (ref 46–116)
ALT SERPL W P-5'-P-CCNC: 27 U/L (ref 12–78)
ANION GAP SERPL CALCULATED.3IONS-SCNC: 10 MMOL/L (ref 4–13)
AST SERPL W P-5'-P-CCNC: 18 U/L (ref 5–45)
BASOPHILS # BLD AUTO: 0.03 THOUSANDS/ΜL (ref 0–0.1)
BASOPHILS NFR BLD AUTO: 1 % (ref 0–1)
BILIRUB SERPL-MCNC: 0.62 MG/DL (ref 0.2–1)
BUN SERPL-MCNC: 39 MG/DL (ref 5–25)
CALCIUM SERPL-MCNC: 9.3 MG/DL (ref 8.3–10.1)
CHLORIDE SERPL-SCNC: 97 MMOL/L (ref 96–108)
CHOLEST SERPL-MCNC: 148 MG/DL
CO2 SERPL-SCNC: 28 MMOL/L (ref 21–32)
CREAT SERPL-MCNC: 1.5 MG/DL (ref 0.6–1.3)
EOSINOPHIL # BLD AUTO: 0.17 THOUSAND/ΜL (ref 0–0.61)
EOSINOPHIL NFR BLD AUTO: 3 % (ref 0–6)
ERYTHROCYTE [DISTWIDTH] IN BLOOD BY AUTOMATED COUNT: 14.3 % (ref 11.6–15.1)
FOLATE SERPL-MCNC: >20 NG/ML (ref 3.1–17.5)
GFR SERPL CREATININE-BSD FRML MDRD: 50 ML/MIN/1.73SQ M
GLUCOSE P FAST SERPL-MCNC: 143 MG/DL (ref 65–99)
HCT VFR BLD AUTO: 42.4 % (ref 36.5–49.3)
HDLC SERPL-MCNC: 35 MG/DL
HGB BLD-MCNC: 14.3 G/DL (ref 12–17)
IMM GRANULOCYTES # BLD AUTO: 0.01 THOUSAND/UL (ref 0–0.2)
IMM GRANULOCYTES NFR BLD AUTO: 0 % (ref 0–2)
IRON SERPL-MCNC: 88 UG/DL (ref 65–175)
LDLC SERPL CALC-MCNC: 90 MG/DL (ref 0–100)
LYMPHOCYTES # BLD AUTO: 1.27 THOUSANDS/ΜL (ref 0.6–4.47)
LYMPHOCYTES NFR BLD AUTO: 24 % (ref 14–44)
MCH RBC QN AUTO: 30.7 PG (ref 26.8–34.3)
MCHC RBC AUTO-ENTMCNC: 33.7 G/DL (ref 31.4–37.4)
MCV RBC AUTO: 91 FL (ref 82–98)
MONOCYTES # BLD AUTO: 0.47 THOUSAND/ΜL (ref 0.17–1.22)
MONOCYTES NFR BLD AUTO: 9 % (ref 4–12)
NEUTROPHILS # BLD AUTO: 3.3 THOUSANDS/ΜL (ref 1.85–7.62)
NEUTS SEG NFR BLD AUTO: 63 % (ref 43–75)
NONHDLC SERPL-MCNC: 113 MG/DL
NRBC BLD AUTO-RTO: 0 /100 WBCS
PLATELET # BLD AUTO: 239 THOUSANDS/UL (ref 149–390)
PMV BLD AUTO: 10.1 FL (ref 8.9–12.7)
POTASSIUM SERPL-SCNC: 5 MMOL/L (ref 3.5–5.3)
PROT SERPL-MCNC: 8.6 G/DL (ref 6.4–8.4)
PSA SERPL-MCNC: 0.6 NG/ML (ref 0–4)
RBC # BLD AUTO: 4.66 MILLION/UL (ref 3.88–5.62)
SODIUM SERPL-SCNC: 135 MMOL/L (ref 135–147)
TRIGL SERPL-MCNC: 114 MG/DL
VIT B12 SERPL-MCNC: 1786 PG/ML (ref 100–900)
WBC # BLD AUTO: 5.25 THOUSAND/UL (ref 4.31–10.16)

## 2022-09-16 PROCEDURE — 85025 COMPLETE CBC W/AUTO DIFF WBC: CPT

## 2022-09-16 PROCEDURE — 82746 ASSAY OF FOLIC ACID SERUM: CPT

## 2022-09-16 PROCEDURE — 82607 VITAMIN B-12: CPT

## 2022-09-16 PROCEDURE — G0103 PSA SCREENING: HCPCS

## 2022-09-16 PROCEDURE — 80053 COMPREHEN METABOLIC PANEL: CPT

## 2022-09-16 PROCEDURE — 36415 COLL VENOUS BLD VENIPUNCTURE: CPT

## 2022-09-16 PROCEDURE — 80061 LIPID PANEL: CPT

## 2022-09-16 PROCEDURE — 83540 ASSAY OF IRON: CPT

## 2022-09-19 ENCOUNTER — OFFICE VISIT (OUTPATIENT)
Dept: INTERNAL MEDICINE CLINIC | Facility: CLINIC | Age: 57
End: 2022-09-19
Payer: MEDICARE

## 2022-09-19 VITALS
WEIGHT: 264.4 LBS | TEMPERATURE: 98.5 F | BODY MASS INDEX: 35.81 KG/M2 | HEART RATE: 90 BPM | HEIGHT: 72 IN | DIASTOLIC BLOOD PRESSURE: 84 MMHG | OXYGEN SATURATION: 98 % | SYSTOLIC BLOOD PRESSURE: 126 MMHG

## 2022-09-19 DIAGNOSIS — E66.01 CLASS 2 SEVERE OBESITY DUE TO EXCESS CALORIES WITH SERIOUS COMORBIDITY AND BODY MASS INDEX (BMI) OF 35.0 TO 35.9 IN ADULT (HCC): ICD-10-CM

## 2022-09-19 DIAGNOSIS — E78.5 DYSLIPIDEMIA: ICD-10-CM

## 2022-09-19 DIAGNOSIS — G62.9 NEUROPATHY: ICD-10-CM

## 2022-09-19 DIAGNOSIS — E11.69 TYPE 2 DIABETES MELLITUS WITH OTHER SPECIFIED COMPLICATION, WITHOUT LONG-TERM CURRENT USE OF INSULIN (HCC): Primary | ICD-10-CM

## 2022-09-19 DIAGNOSIS — M79.605 LEFT LEG PAIN: ICD-10-CM

## 2022-09-19 DIAGNOSIS — N18.32 STAGE 3B CHRONIC KIDNEY DISEASE (HCC): ICD-10-CM

## 2022-09-19 DIAGNOSIS — M86.9 OSTEOMYELITIS OF LEFT FOOT, UNSPECIFIED TYPE (HCC): ICD-10-CM

## 2022-09-19 DIAGNOSIS — I10 ESSENTIAL HYPERTENSION: ICD-10-CM

## 2022-09-19 PROBLEM — E11.621 DIABETIC ULCER OF LEFT FOOT (HCC): Status: RESOLVED | Noted: 2022-05-31 | Resolved: 2022-09-19

## 2022-09-19 PROBLEM — L97.529 DIABETIC ULCER OF LEFT FOOT (HCC): Status: RESOLVED | Noted: 2022-05-31 | Resolved: 2022-09-19

## 2022-09-19 PROCEDURE — 99214 OFFICE O/P EST MOD 30 MIN: CPT | Performed by: INTERNAL MEDICINE

## 2022-09-19 RX ORDER — TRAMADOL HYDROCHLORIDE 50 MG/1
TABLET ORAL
Qty: 60 TABLET | Refills: 0 | Status: SHIPPED | OUTPATIENT
Start: 2022-09-23 | End: 2022-10-25 | Stop reason: SDUPTHER

## 2022-09-19 RX ORDER — PREDNISOLONE ACETATE 10 MG/ML
SUSPENSION/ DROPS OPHTHALMIC
COMMUNITY
Start: 2022-08-18

## 2022-09-19 NOTE — PROGRESS NOTES
Name: Ros Hussein      : 1965      MRN: [id number]  Encounter Provider: Shanda Gillis MD  Encounter Date: 2022   Encounter department: [address]     1  Type 2 diabetes mellitus with other specified complication, without long-term current use of insulin ([facility])  Assessment & Plan:  Most recent fasting blood sugar performed for this visit is a reading of 143 hemoglobin A1c is 6 8% indicating good control the patient's type 2 diabetes  Recommend continued follow-up with endocrinology and continuation of current treatment which is a combination of Jardiance at 25 mg daily and Ozempic of 0 25 mg weekly  Importance of weight reduction and careful diet reviewed  Lab Results   Component Value Date    HGBA1C 6 8 (H) 2022         2  Left leg pain  -     traMADol (Ultram) 50 mg tablet; 2 tablets in evening    3  Essential hypertension  Assessment & Plan:  Blood pressure assessment confirms good control recommend continuation of lisinopril-hydrochlorothiazide 20-12 5 mg b i d   Comprehensive metabolic profile pertaining to electrolyte balance and kidney performance reviewed today      4  Neuropathy  Assessment & Plan:  Peripheral neuropathy symptoms reviewed with the patient today he still has some neuropathy pain that makes it difficult to fall sleep at night  Previous treatment with gabapentin was unsuccessful  He continues on tramadol 100 mg daily at bedtime no indication of adverse effects from the medication or inappropriate use  5  Osteomyelitis of left foot, unspecified type ([facility] [address] )  Assessment & Plan:  Good response to surgical resection of the 5th toe transmetatarsal amputation of the left foot  Wound is healed      6   Stage 3b chronic kidney disease [facility])  Assessment & Plan:  Lab Results   Component Value Date    EGFR 50 2022    EGFR 42 2022    EGFR 37 2022    CREATININE 1 50 (H) 2022    CREATININE 1 72 (H) 07/26/2022    CREATININE 1 91 (H) 07/25/2022   Gradually improving creatinine and GFR values GFR now up to 50 cc/minute continue to avoid dehydration avoid nonsteroidal anti-inflammatories and continue with good blood pressure control next testing in 4 months      7  Dyslipidemia  Assessment & Plan:  Continue low-cholesterol diet and retest of lipid profile in December of this year  8  Class 2 severe obesity due to excess calories with serious comorbidity and body mass index (BMI) of 35 0 to 35 9 in adult [facility])  Assessment & Plan:  Recommend continued effort to reduce total calorie consumption especially carbohydrate based calories benefits include improved diabetic control and he has of control of hypertension as well  BMI Counseling: Body mass index is 35 86 kg/m²  The BMI is above normal  Nutrition recommendations include decreasing portion sizes, moderation in carbohydrate intake, reducing intake of saturated and trans fat and reducing intake of cholesterol  Exercise recommendations include exercising 3-5 times per week  No pharmacotherapy was ordered  Rationale for BMI follow-up plan is due to patient being overweight or obese  Subjective      This 33-year-old gentleman returns today for follow-up assessment  He is walking in a cast shoe due to irritation from issue on the site of his 5th metatarsal amputation on his left foot  He has no fever or chills nor any indication of active infection or inflammation in the left foot  The wound from his previous amputation of the 5th toe at the metatarsal level shows good healing  He has had no hyperglycemia or hypoglycemic episodes  He denies any cardiac symptoms of chest pain palpitations or shortness of breath  Review of Systems   All other systems reviewed and are negative        Current Outpatient Medications on File Prior to Visit   Medication Sig    Alpha-Lipoic Acid 600 MG CAPS Take 600 mg by mouth 2 (two) times a day     [employer] Vinegar 300 MG TABS Take 300 mg by mouth daily      Ascorbic Acid (vitamin C) 1000 MG tablet Take 1,000 mg by mouth 2 (two) times a day    BENFOTIAMINE PO Take 900 mg by mouth 2 (two) times a day    beta carotene 30 MG capsule Take 30 mg by mouth 2 (two) times a day      Blood Glucose Monitoring Suppl (OneTouch Verio Sync System) w/Device KIT Use daily Test sugar daily in the morning      carvedilol (COREG) 25 mg tablet Take 1 tablet (25 mg total) by mouth 2 (two) times a day with meals    Empagliflozin (Jardiance) 25 MG TABS Take 1 tablet (25 mg total) by mouth daily    Garlic 8311 MG CAPS Take by mouth 2 (two) times a day     glucose blood (Contour Next Test) test strip Use to test blood sugar 2 times a day    lidocaine (Lidoderm) 5 % Apply 1 patch topically daily Remove & Discard patch within 12 hours or as directed by MD    lisinopril-hydrochlorothiazide (PRINZIDE,ZESTORETIC) 20-12 5 MG per tablet Take 1 tablet by mouth 2 (two) times a day    prednisoLONE acetate (PRED FORTE) 1 % ophthalmic suspension     Semaglutide,0 25 or 0 5MG/DOS, (Ozempic, 0 25 or 0 5 MG/DOSE,) 2 MG/1 5ML SOPN Inject 0 25 mg under the skin once a week    Turmeric (QC TUMERIC COMPLEX PO) Take 1,000 mg by mouth once Tumeric /curcimin    VITAMIN D PO Take by mouth 2 (two) times a day    vitamin E, tocopherol, 400 units capsule Take 400 Units by mouth 2 (two) times a day    Zinc 50 MG CAPS Take by mouth 2 (two) times a day     [DISCONTINUED] traMADol (Ultram) 50 mg tablet Take 1 tablet (50 mg total) by mouth every 6 (six) hours as needed for moderate pain for up to 30 doses (Patient taking differently: Take 50 mg by mouth 2 tablets in evening)    [DISCONTINUED] methocarbamol (ROBAXIN) 500 mg tablet Take 1 tablet (500 mg total) by mouth 2 (two) times a day       Objective     /84   Pulse 90   Temp 98 5 °F (36 9 °C)   Ht 6' (1 829 m)   Wt 120 kg (264 lb 6 4 oz)   SpO2 98%   BMI 35 86 kg/m²     Physical Exam  Constitutional:       General: He is not in acute distress  Appearance: He is well-developed  He is not ill-appearing  HENT:      Head: Normocephalic  Right Ear: Hearing and external ear normal       Left Ear: Hearing and external ear normal       Nose: Nose normal    Eyes:      Conjunctiva/sclera: Conjunctivae normal       Pupils: Pupils are equal, round, and reactive to light  Neck:      Thyroid: No thyromegaly  Cardiovascular:      Rate and Rhythm: Normal rate and regular rhythm  Heart sounds: Normal heart sounds, S1 normal and S2 normal  No murmur heard  Pulmonary:      Effort: Pulmonary effort is normal       Breath sounds: Normal breath sounds  No wheezing, rhonchi or rales  Abdominal:      General: Bowel sounds are normal       Palpations: Abdomen is soft  Tenderness: There is no abdominal tenderness  Musculoskeletal:         General: Normal range of motion  Comments: Amputation of the 5th toe left foot   Lymphadenopathy:      Cervical: No cervical adenopathy  Skin:     General: Skin is warm and dry  Neurological:      Mental Status: He is alert and oriented to person, place, and time  Mental status is at baseline  Deep Tendon Reflexes: Reflexes are normal and symmetric  Reflexes normal    Psychiatric:         Mood and Affect: Mood normal          Behavior: Behavior normal          Thought Content:  Thought content normal          Judgment: Judgment normal        Tuan Hines MD

## 2022-09-19 NOTE — ASSESSMENT & PLAN NOTE
Peripheral neuropathy symptoms reviewed with the patient today he still has some neuropathy pain that makes it difficult to fall sleep at night  Previous treatment with gabapentin was unsuccessful  He continues on tramadol 100 mg daily at bedtime no indication of adverse effects from the medication or inappropriate use

## 2022-09-19 NOTE — ASSESSMENT & PLAN NOTE
Recommend continued effort to reduce total calorie consumption especially carbohydrate based calories benefits include improved diabetic control and he has of control of hypertension as well

## 2022-09-19 NOTE — ASSESSMENT & PLAN NOTE
Lab Results   Component Value Date    EGFR 50 09/16/2022    EGFR 42 07/26/2022    EGFR 37 07/25/2022    CREATININE 1 50 (H) 09/16/2022    CREATININE 1 72 (H) 07/26/2022    CREATININE 1 91 (H) 07/25/2022   Gradually improving creatinine and GFR values GFR now up to 50 cc/minute continue to avoid dehydration avoid nonsteroidal anti-inflammatories and continue with good blood pressure control next testing in 4 months

## 2022-09-19 NOTE — ASSESSMENT & PLAN NOTE
Good response to surgical resection of the 5th toe transmetatarsal amputation of the left foot    Wound is healed

## 2022-09-19 NOTE — ASSESSMENT & PLAN NOTE
Most recent fasting blood sugar performed for this visit is a reading of 143 hemoglobin A1c is 6 8% indicating good control the patient's type 2 diabetes  Recommend continued follow-up with endocrinology and continuation of current treatment which is a combination of Jardiance at 25 mg daily and Ozempic of 0 25 mg weekly  Importance of weight reduction and careful diet reviewed    Lab Results   Component Value Date    HGBA1C 6 8 (H) 07/26/2022

## 2022-09-19 NOTE — ASSESSMENT & PLAN NOTE
Blood pressure assessment confirms good control recommend continuation of lisinopril-hydrochlorothiazide 20-12 5 mg b i d   Comprehensive metabolic profile pertaining to electrolyte balance and kidney performance reviewed today

## 2022-09-22 ENCOUNTER — PATIENT OUTREACH (OUTPATIENT)
Dept: INTERNAL MEDICINE CLINIC | Facility: CLINIC | Age: 57
End: 2022-09-22

## 2022-10-25 ENCOUNTER — OFFICE VISIT (OUTPATIENT)
Dept: INTERNAL MEDICINE CLINIC | Facility: CLINIC | Age: 57
End: 2022-10-25

## 2022-10-25 VITALS
BODY MASS INDEX: 36.54 KG/M2 | TEMPERATURE: 98.5 F | WEIGHT: 269.8 LBS | SYSTOLIC BLOOD PRESSURE: 128 MMHG | HEIGHT: 72 IN | OXYGEN SATURATION: 96 % | DIASTOLIC BLOOD PRESSURE: 78 MMHG | HEART RATE: 89 BPM

## 2022-10-25 DIAGNOSIS — E11.69 TYPE 2 DIABETES MELLITUS WITH OTHER SPECIFIED COMPLICATION, WITHOUT LONG-TERM CURRENT USE OF INSULIN (HCC): ICD-10-CM

## 2022-10-25 DIAGNOSIS — F11.20 CONTINUOUS OPIOID DEPENDENCE (HCC): ICD-10-CM

## 2022-10-25 DIAGNOSIS — M79.605 LEFT LEG PAIN: ICD-10-CM

## 2022-10-25 DIAGNOSIS — G62.9 NEUROPATHY: Primary | ICD-10-CM

## 2022-10-25 DIAGNOSIS — I10 ESSENTIAL HYPERTENSION: ICD-10-CM

## 2022-10-25 DIAGNOSIS — N18.32 STAGE 3B CHRONIC KIDNEY DISEASE (HCC): ICD-10-CM

## 2022-10-25 RX ORDER — TRAMADOL HYDROCHLORIDE 50 MG/1
TABLET ORAL
Qty: 60 TABLET | Refills: 0 | Status: SHIPPED | OUTPATIENT
Start: 2022-10-25

## 2022-10-25 RX ORDER — VENLAFAXINE HYDROCHLORIDE 37.5 MG/1
CAPSULE, EXTENDED RELEASE ORAL
COMMUNITY
Start: 2022-10-25

## 2022-10-25 NOTE — ASSESSMENT & PLAN NOTE
Lab Results   Component Value Date    EGFR 50 09/16/2022    EGFR 42 07/26/2022    EGFR 37 07/25/2022    CREATININE 1 50 (H) 09/16/2022    CREATININE 1 72 (H) 07/26/2022    CREATININE 1 91 (H) 07/25/2022   Stage 3 chronic kidney disease remains stable most recent GFR is 50 cc/minute advise against any nonsteroidal anti inflammatory medications and avoidance of dehydration

## 2022-10-25 NOTE — ASSESSMENT & PLAN NOTE
Reviewed present opioid use patient is using 100 mg of tramadol at bedtime to assist with falling asleep due to ongoing chronic neuropathy symptoms  No adverse side effects of this medication noted  No abnormal activity on the state controlled substance web side

## 2022-10-25 NOTE — PROGRESS NOTES
Name: Rafaela Sarkar      : 1965      MRN: [id number]  Encounter Provider: Felicia Velez MD  Encounter Date: 10/25/2022   Encounter department: [address]     1  Neuropathy  Assessment & Plan:  Appreciate note from Neurology services at [facility] who recommended trial of Effexor 37 5 mg daily to complement his tramadol in attempt to control his neuropathy pain  Patient continues to use tramadol 100 mg at bedtime to block out his neuropathy pain  Previous trial of gabapentin was unsuccessful  2  Essential hypertension  Assessment & Plan:  Hypertension assessment today confirms good control reading is 128/78 continue current blood pressure medication lisinopril hydrochlorothiazide 20-12 5 mg 1 tablet twice a day and carvedilol 25 mg twice a day  3  Type 2 diabetes mellitus with other specified complication, without long-term current use of insulin [facility])  Assessment & Plan:  Most recent consultation note with endocrinology reviewed agree with their assessment and recommendation  Continue Jardiance and Ozempic at this time  No recent symptoms or signs of hypoglycemia or hyperglycemia  Lab Results   Component Value Date    HGBA1C 6 8 (H) 2022         4  Left leg pain  -     traMADol (Ultram) 50 mg tablet; 2 tablets in evening    5  Continuous opioid dependence ([address] )  Assessment & Plan:  Reviewed present opioid use patient is using 100 mg of tramadol at bedtime to assist with falling asleep due to ongoing chronic neuropathy symptoms  No adverse side effects of this medication noted  No abnormal activity on the state controlled substance web side        6  Stage 3b chronic kidney disease [facility])  Assessment & Plan:  Lab Results   Component Value Date    EGFR 50 2022    EGFR 42 2022    EGFR 37 2022    CREATININE 1 50 (H) 2022    CREATININE 1 72 (H) 2022    CREATININE 1 91 (H) 2022   Stage 3 chronic kidney disease remains stable most recent GFR is 50 cc/minute advise against any nonsteroidal anti inflammatory medications and avoidance of dehydration  Subjective      This 75-year-old gentleman returns today for a follow-up assessment  Since his last visit with us he has been seen by Neurology services at [facility]  They have recommended the initiation of a trial of Effexor antidepressive medication to see if it will assist with control of his neuropathy pain  He is currently taking 100 mg of tramadol daily  He uses this at bedtime to help him sleep at night  The patient has done well with the healing process of his left foot  Fifth toe has been amputated no evidence of any issues at this time  He did receive a pair special shoes from his podiatrist but he is not sure is going to keep him  Review of Systems   Neurological:        Radiculopathy pain   All other systems reviewed and are negative  Current Outpatient Medications on File Prior to Visit   Medication Sig   • Alpha-Lipoic Acid 600 MG CAPS Take 600 mg by mouth 2 (two) times a day     • Apple Cider Vinegar 300 MG TABS Take 300 mg by mouth daily     • Ascorbic Acid (vitamin C) 1000 MG tablet Take 1,000 mg by mouth 2 (two) times a day   • BENFOTIAMINE PO Take 900 mg by mouth 2 (two) times a day   • beta carotene 30 MG capsule Take 30 mg by mouth 2 (two) times a day     • Blood Glucose Monitoring Suppl (OneTouch Verio Sync System) w/Device KIT Use daily Test sugar daily in the morning     • carvedilol (COREG) 25 mg tablet Take 1 tablet (25 mg total) by mouth 2 (two) times a day with meals   • Empagliflozin (Jardiance) 25 MG TABS Take 1 tablet (25 mg total) by mouth daily   • Garlic 8329 MG CAPS Take by mouth 2 (two) times a day    • glucose blood (Contour Next Test) test strip Use to test blood sugar 2 times a day   • lidocaine (Lidoderm) 5 % Apply 1 patch topically daily Remove & Discard patch within 12 hours or as directed by MD   • lisinopril-hydrochlorothiazide (PRINZIDE,ZESTORETIC) 20-12 5 MG per tablet Take 1 tablet by mouth 2 (two) times a day   • prednisoLONE acetate (PRED FORTE) 1 % ophthalmic suspension    • Semaglutide,0 25 or 0 5MG/DOS, (Ozempic, 0 25 or 0 5 MG/DOSE,) 2 MG/1 5ML SOPN Inject 0 25 mg under the skin once a week   • Turmeric (QC TUMERIC COMPLEX PO) Take 1,000 mg by mouth once Tumeric /curcimin   • venlafaxine (EFFEXOR-XR) 37 5 mg 24 hr capsule    • VITAMIN D PO Take by mouth 2 (two) times a day   • vitamin E, tocopherol, 400 units capsule Take 400 Units by mouth 2 (two) times a day   • Zinc 50 MG CAPS Take by mouth 2 (two) times a day    • [DISCONTINUED] traMADol (Ultram) 50 mg tablet 2 tablets in evening       Objective     /78   Pulse 89   Temp 98 5 °F (36 9 °C)   Ht 6' (1 829 m)   Wt 122 kg (269 lb 12 8 oz)   SpO2 96%   BMI 36 59 kg/m²     Physical Exam  Constitutional:       Appearance: He is well-developed  He is not toxic-appearing or diaphoretic  HENT:      Head: Normocephalic  Right Ear: Hearing and external ear normal       Left Ear: Hearing and external ear normal       Nose: Nose normal    Eyes:      Conjunctiva/sclera: Conjunctivae normal       Pupils: Pupils are equal, round, and reactive to light  Neck:      Thyroid: No thyromegaly  Cardiovascular:      Rate and Rhythm: Normal rate and regular rhythm  Heart sounds: Normal heart sounds, S1 normal and S2 normal  No murmur heard  Pulmonary:      Effort: Pulmonary effort is normal       Breath sounds: Normal breath sounds  No wheezing, rhonchi or rales  Abdominal:      General: Bowel sounds are normal       Palpations: Abdomen is soft  Musculoskeletal:         General: Normal range of motion  Right lower leg: No edema  Left lower leg: No edema  Lymphadenopathy:      Cervical: No cervical adenopathy  Skin:     General: Skin is warm and dry     Neurological:      Mental Status: He is alert and oriented to person, place, and time  Mental status is at baseline  Deep Tendon Reflexes: Reflexes are normal and symmetric  Psychiatric:         Behavior: Behavior normal          Thought Content:  Thought content normal          Judgment: Judgment normal        Farhad Centeno MD

## 2022-10-25 NOTE — ASSESSMENT & PLAN NOTE
Appreciate note from Neurology services at [facility] who recommended trial of Effexor 37 5 mg daily to complement his tramadol in attempt to control his neuropathy pain  Patient continues to use tramadol 100 mg at bedtime to block out his neuropathy pain  Previous trial of gabapentin was unsuccessful

## 2022-10-25 NOTE — ASSESSMENT & PLAN NOTE
Most recent consultation note with endocrinology reviewed agree with their assessment and recommendation  Continue Jardiance and Ozempic at this time  No recent symptoms or signs of hypoglycemia or hyperglycemia    Lab Results   Component Value Date    HGBA1C 6 8 (H) 07/26/2022

## 2022-10-25 NOTE — ASSESSMENT & PLAN NOTE
Hypertension assessment today confirms good control reading is 128/78 continue current blood pressure medication lisinopril hydrochlorothiazide 20-12 5 mg 1 tablet twice a day and carvedilol 25 mg twice a day

## 2022-11-10 ENCOUNTER — FOLLOW UP (OUTPATIENT)
Dept: URBAN - METROPOLITAN AREA CLINIC 6 | Facility: CLINIC | Age: 57
End: 2022-11-10

## 2022-11-10 DIAGNOSIS — H57.11: ICD-10-CM

## 2022-11-10 DIAGNOSIS — H40.012: ICD-10-CM

## 2022-11-10 DIAGNOSIS — Z96.1: ICD-10-CM

## 2022-11-10 PROCEDURE — 92012 INTRM OPH EXAM EST PATIENT: CPT

## 2022-11-10 ASSESSMENT — KERATOMETRY
OS_K1POWER_DIOPTERS: 38.50
OS_AXISANGLE2_DEGREES: 90
OS_K2POWER_DIOPTERS: 38.50
OS_AXISANGLE_DEGREES: 180

## 2022-11-10 ASSESSMENT — TONOMETRY
OD_IOP_MMHG: 17
OS_IOP_MMHG: 16

## 2022-11-10 ASSESSMENT — VISUAL ACUITY
OD_SC: 20/30+1
OS_SC: 20/20-1

## 2022-11-14 ENCOUNTER — RA CDI HCC (OUTPATIENT)
Dept: OTHER | Facility: HOSPITAL | Age: 57
End: 2022-11-14

## 2022-11-14 NOTE — PROGRESS NOTES
[id number]  [facility]  coding opportunities          Chart Reviewed number of suggestions sent to Provider: 1     Patients Insurance     Medicare Insurance: [employer]

## 2022-11-21 ENCOUNTER — OFFICE VISIT (OUTPATIENT)
Dept: INTERNAL MEDICINE CLINIC | Facility: CLINIC | Age: 57
End: 2022-11-21

## 2022-11-21 VITALS
HEIGHT: 72 IN | HEART RATE: 81 BPM | DIASTOLIC BLOOD PRESSURE: 68 MMHG | SYSTOLIC BLOOD PRESSURE: 122 MMHG | TEMPERATURE: 96.9 F | OXYGEN SATURATION: 97 % | WEIGHT: 269 LBS | BODY MASS INDEX: 36.44 KG/M2

## 2022-11-21 DIAGNOSIS — M79.605 LEFT LEG PAIN: ICD-10-CM

## 2022-11-21 DIAGNOSIS — Z13.0 SCREENING FOR DEFICIENCY ANEMIA: ICD-10-CM

## 2022-11-21 DIAGNOSIS — F11.20 CONTINUOUS OPIOID DEPENDENCE (HCC): ICD-10-CM

## 2022-11-21 DIAGNOSIS — I10 ESSENTIAL HYPERTENSION: Primary | ICD-10-CM

## 2022-11-21 DIAGNOSIS — E11.69 TYPE 2 DIABETES MELLITUS WITH OTHER SPECIFIED COMPLICATION, WITHOUT LONG-TERM CURRENT USE OF INSULIN (HCC): ICD-10-CM

## 2022-11-21 DIAGNOSIS — R22.1 NECK MASS: ICD-10-CM

## 2022-11-21 PROBLEM — M54.2 NECK PAIN: Status: RESOLVED | Noted: 2021-03-26 | Resolved: 2022-11-21

## 2022-11-21 RX ORDER — TRAMADOL HYDROCHLORIDE 50 MG/1
TABLET ORAL
Qty: 60 TABLET | Refills: 0 | Status: SHIPPED | OUTPATIENT
Start: 2022-11-25

## 2022-11-21 NOTE — ASSESSMENT & PLAN NOTE
No recent signs or symptoms of uncontrolled hyperglycemia or hypoglycemia  Recommend continuation of treatment through endocrinology services  Continue Jardiance and Ozempic at this time    Lab Results   Component Value Date    HGBA1C 6 8 (H) 07/26/2022

## 2022-11-21 NOTE — ASSESSMENT & PLAN NOTE
Appreciate note from neurology at [facility] regarding the patient's of chronic neuropathy symptoms  A trial on Effexor was not beneficial the patient began experiencing nausea from the medication and discontinued it  He continues on tramadol 50 mg 2 tablets at bedtime

## 2022-11-21 NOTE — PROGRESS NOTES
Name: Live Cast      : 1965      MRN: [id number]  Encounter Provider: Zamzam Smiley MD  Encounter Date: 2022   Encounter department: [address]     1  Essential hypertension  Assessment & Plan:  Blood pressure management was assessed today blood pressure remains in excellent range recommend continuation of lisinopril hydrochlorothiazide at current dosing 20-12 5 mg daily  2  Screening for deficiency anemia  -     CBC and differential; Future    3  Left leg pain  -     traMADol (Ultram) 50 mg tablet; 2 tablets in evening Do not start before 2022  4  Continuous opioid dependence ([facility] [address] )  Assessment & Plan:  Continuous opioid dependence is monitored closely on a monthly basis  No abnormal activity on the state controlled substance web site new prescription was provided to the patient to be filled no sooner than       5  Type 2 diabetes mellitus with other specified complication, without long-term current use of insulin (HCC)  Assessment & Plan:  No recent signs or symptoms of uncontrolled hyperglycemia or hypoglycemia  Recommend continuation of treatment through endocrinology services  Continue Jardiance and Ozempic at this time  Lab Results   Component Value Date    HGBA1C 6 8 (H) 2022         6  Neck mass  Assessment & Plan:  Right neck submandibular mass detected recently by the patient of mass is of mobile  Does not seem to be fixed  Of seems rather close to the surface of the skin and may in fact be some type of a cyst   There is no evidence of any other swellings in the neck supraclavicular or infraclavicular regions  I will recheck him in 1 month and I have asked him to have a CBC before that visit  Subjective      This 17-year-old gentleman returns today for 1 month follow-up visit  Unfortunately his trial on Effexor did not go well    Several days after he sauce he began experiencing nausea and he discontinued the Effexor  He continues on tramadol for control of his discomfort at bedtime  I have reviewed the most recent notes from his neurologist regarding workup of his chronic pain  He has been on quite a few different medications none of which have worked very well  The only thing it seems to give him some benefit is tramadol at bedtime  We continue to closely monitor his use of this medication  There is no unusual activity on the [place] controlled substance web site  Patient indicates that he will be starting a part-time job in the near future 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  Does not require any physical exertion  Patient does note a right side submandibular mass that he just recently discovered  Seems to be rather superficial and is nontender  There is no irritation to the skin on top of the area of swelling  Review of Systems   HENT:        Submandibular mass right side of the neck   Musculoskeletal: Positive for back pain  All other systems reviewed and are negative  Current Outpatient Medications on File Prior to Visit   Medication Sig   • Alpha-Lipoic Acid 600 MG CAPS Take 600 mg by mouth 2 (two) times a day     • Apple Cider Vinegar 300 MG TABS Take 300 mg by mouth daily     • Ascorbic Acid (vitamin C) 1000 MG tablet Take 1,000 mg by mouth 2 (two) times a day   • BENFOTIAMINE PO Take 900 mg by mouth 2 (two) times a day   • beta carotene 30 MG capsule Take 30 mg by mouth 2 (two) times a day     • Blood Glucose Monitoring Suppl (OneTouch Verio Sync System) w/Device KIT Use daily Test sugar daily in the morning     • carvedilol (COREG) 25 mg tablet Take 1 tablet (25 mg total) by mouth 2 (two) times a day with meals   • Empagliflozin (Jardiance) 25 MG TABS Take 1 tablet (25 mg total) by mouth daily   • Garlic 9741 MG CAPS Take by mouth 2 (two) times a day    • glucose blood (Contour Next Test) test strip Use to test blood sugar 2 times a day   • lidocaine (Lidoderm) 5 % Apply 1 patch topically daily Remove & Discard patch within 12 hours or as directed by MD   • lisinopril-hydrochlorothiazide (PRINZIDE,ZESTORETIC) 20-12 5 MG per tablet Take 1 tablet by mouth 2 (two) times a day   • prednisoLONE acetate (PRED FORTE) 1 % ophthalmic suspension    • Semaglutide,0 25 or 0 5MG/DOS, (Ozempic, 0 25 or 0 5 MG/DOSE,) 2 MG/1 5ML SOPN Inject 0 25 mg under the skin once a week   • Turmeric (QC TUMERIC COMPLEX PO) Take 1,000 mg by mouth once Tumeric /curcimin   • VITAMIN D PO Take by mouth 2 (two) times a day   • vitamin E, tocopherol, 400 units capsule Take 400 Units by mouth 2 (two) times a day   • Zinc 50 MG CAPS Take by mouth 2 (two) times a day    • [DISCONTINUED] traMADol (Ultram) 50 mg tablet 2 tablets in evening   • [DISCONTINUED] venlafaxine (EFFEXOR-XR) 37 5 mg 24 hr capsule        Objective     /68   Pulse 81   Temp (!) 96 9 °F (36 1 °C) (Tympanic)   Ht 6' (1 829 m)   Wt 122 kg (269 lb)   SpO2 97%   BMI 36 48 kg/m²     Physical Exam  Constitutional:       General: He is not in acute distress  Appearance: He is well-developed and well-nourished  He is not ill-appearing  HENT:      Head: Normocephalic  Right Ear: Hearing, tympanic membrane, ear canal and external ear normal       Left Ear: Hearing, tympanic membrane, ear canal and external ear normal       Nose: Nose normal       Mouth/Throat:      Mouth: Oropharynx is clear and moist and mucous membranes are normal    Eyes:      Conjunctiva/sclera: Conjunctivae normal       Pupils: Pupils are equal, round, and reactive to light  Neck:      Thyroid: No thyromegaly  Vascular: No carotid bruit  Comments: A mass is noted in the right submandibular region  Do not detect any other swelling in the neck supraclavicular air infraclavicular regions    This swelling is approximately 1/2 inch in diameter does not seem to have any particular tenderness on palpation  Cardiovascular:      Rate and Rhythm: Normal rate and regular rhythm  Pulses: Intact distal pulses  Heart sounds: Normal heart sounds, S1 normal and S2 normal  No murmur heard  Pulmonary:      Effort: Pulmonary effort is normal       Breath sounds: Normal breath sounds  Abdominal:      General: Bowel sounds are normal       Palpations: Abdomen is soft  Tenderness: There is no abdominal tenderness  Musculoskeletal:         General: No edema  Normal range of motion  Cervical back: Normal range of motion and neck supple  No rigidity or tenderness  Lymphadenopathy:      Cervical: No cervical adenopathy  Skin:     General: Skin is warm and dry  Neurological:      Mental Status: He is alert and oriented to person, place, and time  Mental status is at baseline  Deep Tendon Reflexes: Reflexes are normal and symmetric  Reflexes normal    Psychiatric:         Mood and Affect: Mood and affect normal          Behavior: Behavior normal          Thought Content:  Thought content normal          Judgment: Judgment normal        Rochelle Solano MD

## 2022-11-21 NOTE — ASSESSMENT & PLAN NOTE
Blood pressure management was assessed today blood pressure remains in excellent range recommend continuation of lisinopril hydrochlorothiazide at current dosing 20-12 5 mg daily

## 2022-11-21 NOTE — ASSESSMENT & PLAN NOTE
Right neck submandibular mass detected recently by the patient of mass is of mobile  Does not seem to be fixed  Of seems rather close to the surface of the skin and may in fact be some type of a cyst   There is no evidence of any other swellings in the neck supraclavicular or infraclavicular regions  I will recheck him in 1 month and I have asked him to have a CBC before that visit

## 2022-11-21 NOTE — ASSESSMENT & PLAN NOTE
Continuous opioid dependence is monitored closely on a monthly basis    No abnormal activity on the state controlled substance web site new prescription was provided to the patient to be filled no sooner than November 25th

## 2022-11-21 NOTE — ASSESSMENT & PLAN NOTE
Lab Results   Component Value Date    EGFR 50 09/16/2022    EGFR 42 07/26/2022    EGFR 37 07/25/2022    CREATININE 1 50 (H) 09/16/2022    CREATININE 1 72 (H) 07/26/2022    CREATININE 1 91 (H) 07/25/2022

## 2022-11-23 ENCOUNTER — PATIENT OUTREACH (OUTPATIENT)
Dept: INTERNAL MEDICINE CLINIC | Facility: CLINIC | Age: 57
End: 2022-11-23

## 2022-11-23 NOTE — PROGRESS NOTES
Chart review- patient attending medical appointments No hospitalization or emergency room visits since 7/29/22

## 2022-12-21 ENCOUNTER — OFFICE VISIT (OUTPATIENT)
Dept: INTERNAL MEDICINE CLINIC | Facility: CLINIC | Age: 57
End: 2022-12-21

## 2022-12-21 VITALS
OXYGEN SATURATION: 97 % | DIASTOLIC BLOOD PRESSURE: 76 MMHG | BODY MASS INDEX: 36.65 KG/M2 | HEIGHT: 72 IN | TEMPERATURE: 97.5 F | HEART RATE: 82 BPM | SYSTOLIC BLOOD PRESSURE: 130 MMHG | WEIGHT: 270.6 LBS

## 2022-12-21 DIAGNOSIS — F11.20 CONTINUOUS OPIOID DEPENDENCE (HCC): ICD-10-CM

## 2022-12-21 DIAGNOSIS — I10 ESSENTIAL HYPERTENSION: ICD-10-CM

## 2022-12-21 DIAGNOSIS — M79.605 LEFT LEG PAIN: ICD-10-CM

## 2022-12-21 DIAGNOSIS — M54.16 LUMBAR RADICULOPATHY: ICD-10-CM

## 2022-12-21 DIAGNOSIS — R22.1 NECK MASS: ICD-10-CM

## 2022-12-21 DIAGNOSIS — M19.90 ARTHRITIS: Primary | ICD-10-CM

## 2022-12-21 RX ORDER — TRAMADOL HYDROCHLORIDE 50 MG/1
TABLET ORAL
Qty: 60 TABLET | Refills: 0 | Status: SHIPPED | OUTPATIENT
Start: 2022-12-21

## 2022-12-21 NOTE — ASSESSMENT & PLAN NOTE
Blood pressure assessment today confirms adequate control I recommend the continuation of his current medication lisinopril hydrochlorothiazide 20-12 5 mg twice a day and 25 mg of carvedilol twice a day

## 2022-12-21 NOTE — ASSESSMENT & PLAN NOTE
Right side neck mass seems to have stabilized  We do not feel any increase in the size of anything it slightly smaller than his last visit and at this point seems to be stabilizing issue    Suspect this is a scarred subcutaneous cyst

## 2022-12-21 NOTE — ASSESSMENT & PLAN NOTE
Lumbar radiculopathy with left leg discomfort continues patient has requested a refill on tramadol which has been provided to his pharmacy after review of the [place] controlled substance website indicates no abnormal activity

## 2022-12-21 NOTE — ASSESSMENT & PLAN NOTE
Arthritis of the hands and other locations noted by the patient he is requesting consultation with an arthritis specialist   We provided him with a referral during today's visit

## 2022-12-21 NOTE — ASSESSMENT & PLAN NOTE
Patient continues on tramadol for his chronic pain    There is no indication of any impairment or abnormal use of the medication we will continue to carefully monitor his use of this medication

## 2022-12-21 NOTE — PROGRESS NOTES
Name: Tj Rowan      : 1965      MRN: [id number]  Encounter Provider: Adelso Rose MD  Encounter Date: 2022   Encounter department: [facility]     1  Arthritis  Assessment & Plan:  Arthritis of the hands and other locations noted by the patient he is requesting consultation with an arthritis specialist   We provided him with a referral during today's visit    Orders:  -     Ambulatory Referral to Rheumatology; Future    2  Left leg pain  -     traMADol (Ultram) 50 mg tablet; 2 tablets in evening    3  Essential hypertension  Assessment & Plan:  Blood pressure assessment today confirms adequate control I recommend the continuation of his current medication lisinopril hydrochlorothiazide 20-12 5 mg twice a day and 25 mg of carvedilol twice a day      4  Neck mass  Assessment & Plan:  Right side neck mass seems to have stabilized  We do not feel any increase in the size of anything it slightly smaller than his last visit and at this point seems to be stabilizing issue  Suspect this is a scarred subcutaneous cyst      5  Lumbar radiculopathy  Assessment & Plan:  Lumbar radiculopathy with left leg discomfort continues patient has requested a refill on tramadol which has been provided to his pharmacy after review of the [place] controlled substance website indicates no abnormal activity  6  Continuous opioid dependence ([facility] [address] )  Assessment & Plan:  Patient continues on tramadol for his chronic pain  There is no indication of any impairment or abnormal use of the medication we will continue to carefully monitor his use of this medication           Subjective      This gentleman returns for a routine follow-up visit  He continues to experience left leg pain and is requested a refill on his tramadol medication    Review of the state website for controlled substances indicates no abnormal activity and a refill was sent to his preferred pharmacy  He 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 [employer]  He does not have to participate in any strenuous physical activities  Given his history of lumbar radiculopathy is advisable that he 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  If necessary I will provide him with a letter guarding these restrictions  He has had no issues with his blood sugar control with no symptoms or signs of uncontrolled hyperglycemia or hypoglycemia  Review of Systems   Musculoskeletal: Positive for arthralgias  Arthritic issues in the hands   Neurological:        Lumbar radiculopathy pain in left leg   All other systems reviewed and are negative  Current Outpatient Medications on File Prior to Visit   Medication Sig   • Alpha-Lipoic Acid 600 MG CAPS Take 600 mg by mouth 2 (two) times a day     • Apple Cider Vinegar 300 MG TABS Take 300 mg by mouth daily     • Ascorbic Acid (vitamin C) 1000 MG tablet Take 1,000 mg by mouth 2 (two) times a day   • BENFOTIAMINE PO Take 900 mg by mouth 2 (two) times a day   • beta carotene 30 MG capsule Take 30 mg by mouth 2 (two) times a day     • Blood Glucose Monitoring Suppl (OneTouch Verio Sync System) w/Device KIT Use daily Test sugar daily in the morning     • carvedilol (COREG) 25 mg tablet Take 1 tablet (25 mg total) by mouth 2 (two) times a day with meals   • Empagliflozin (Jardiance) 25 MG TABS Take 1 tablet (25 mg total) by mouth daily   • Garlic 4071 MG CAPS Take by mouth 2 (two) times a day    • glucose blood (Contour Next Test) test strip Use to test blood sugar 2 times a day   • lidocaine (Lidoderm) 5 % Apply 1 patch topically daily Remove & Discard patch within 12 hours or as directed by MD   • lisinopril-hydrochlorothiazide (PRINZIDE,ZESTORETIC) 20-12 5 MG per tablet Take 1 tablet by mouth 2 (two) times a day   • prednisoLONE acetate (PRED FORTE) 1 % ophthalmic suspension    • Semaglutide,0 25 or 0 5MG/DOS, (Ozempic, 0 25 or 0 5 MG/DOSE,) 2 MG/1 5ML SOPN Inject 0 25 mg under the skin once a week   • Turmeric (QC TUMERIC COMPLEX PO) Take 1,000 mg by mouth once Tumeric /curcimin   • VITAMIN D PO Take by mouth 2 (two) times a day   • vitamin E, tocopherol, 400 units capsule Take 400 Units by mouth 2 (two) times a day   • Zinc 50 MG CAPS Take by mouth 2 (two) times a day    • [DISCONTINUED] traMADol (Ultram) 50 mg tablet 2 tablets in evening Do not start before November 25, 2022  Objective     /76   Pulse 82   Temp 97 5 °F (36 4 °C)   Ht 6' (1 829 m)   Wt 123 kg (270 lb 9 6 oz)   SpO2 97%   BMI 36 70 kg/m²     Physical Exam  Constitutional:       General: He is not in acute distress  Appearance: He is well-developed  He is not ill-appearing  HENT:      Right Ear: Hearing and external ear normal       Left Ear: Hearing and external ear normal       Nose: Nose normal    Eyes:      Conjunctiva/sclera: Conjunctivae normal       Pupils: Pupils are equal, round, and reactive to light  Neck:      Thyroid: No thyromegaly  Cardiovascular:      Rate and Rhythm: Normal rate and regular rhythm  Heart sounds: Normal heart sounds, S1 normal and S2 normal  No murmur heard  Pulmonary:      Effort: Pulmonary effort is normal       Breath sounds: Normal breath sounds  Abdominal:      General: Bowel sounds are normal       Palpations: Abdomen is soft  Musculoskeletal:         General: Normal range of motion  Lymphadenopathy:      Cervical: No cervical adenopathy  Skin:     General: Skin is warm and dry  Neurological:      Mental Status: He is alert and oriented to person, place, and time  Deep Tendon Reflexes: Reflexes are normal and symmetric  Reflexes normal    Psychiatric:         Behavior: Behavior normal          Thought Content:  Thought content normal          Judgment: Judgment normal  Geetha Andre MD

## 2023-01-04 DIAGNOSIS — E11.69 TYPE 2 DIABETES MELLITUS WITH OTHER SPECIFIED COMPLICATION, WITHOUT LONG-TERM CURRENT USE OF INSULIN (HCC): Primary | ICD-10-CM

## 2023-01-04 RX ORDER — BLOOD-GLUCOSE METER
EACH MISCELLANEOUS
Qty: 1 KIT | Refills: 0 | Status: SHIPPED | OUTPATIENT
Start: 2023-01-04

## 2023-01-04 RX ORDER — LANCETS
EACH MISCELLANEOUS
Qty: 102 EACH | Refills: 1 | Status: SHIPPED | OUTPATIENT
Start: 2023-01-04

## 2023-01-04 RX ORDER — BLOOD SUGAR DIAGNOSTIC
STRIP MISCELLANEOUS
Qty: 100 STRIP | Refills: 3 | Status: SHIPPED | OUTPATIENT
Start: 2023-01-04

## 2023-01-23 DIAGNOSIS — M79.605 LEFT LEG PAIN: ICD-10-CM

## 2023-01-23 RX ORDER — TRAMADOL HYDROCHLORIDE 50 MG/1
TABLET ORAL
Qty: 60 TABLET | Refills: 0 | Status: SHIPPED | OUTPATIENT
Start: 2023-01-23

## 2023-01-30 ENCOUNTER — APPOINTMENT (OUTPATIENT)
Dept: LAB | Facility: HOSPITAL | Age: 58
End: 2023-01-30

## 2023-01-30 ENCOUNTER — OFFICE VISIT (OUTPATIENT)
Dept: ENDOCRINOLOGY | Facility: CLINIC | Age: 58
End: 2023-01-30

## 2023-01-30 VITALS
BODY MASS INDEX: 36.57 KG/M2 | HEART RATE: 71 BPM | SYSTOLIC BLOOD PRESSURE: 120 MMHG | WEIGHT: 270 LBS | DIASTOLIC BLOOD PRESSURE: 78 MMHG | HEIGHT: 72 IN

## 2023-01-30 DIAGNOSIS — G62.9 NEUROPATHY: ICD-10-CM

## 2023-01-30 DIAGNOSIS — I10 ESSENTIAL HYPERTENSION: ICD-10-CM

## 2023-01-30 DIAGNOSIS — E11.69 TYPE 2 DIABETES MELLITUS WITH OTHER SPECIFIED COMPLICATION, WITHOUT LONG-TERM CURRENT USE OF INSULIN (HCC): Primary | ICD-10-CM

## 2023-01-30 DIAGNOSIS — N18.32 STAGE 3B CHRONIC KIDNEY DISEASE (HCC): ICD-10-CM

## 2023-01-30 DIAGNOSIS — Z86.31 H/O DIABETIC FOOT ULCER: ICD-10-CM

## 2023-01-30 DIAGNOSIS — Z13.0 SCREENING FOR DEFICIENCY ANEMIA: ICD-10-CM

## 2023-01-30 LAB
ALBUMIN SERPL BCP-MCNC: 4.4 G/DL (ref 3.5–5)
ALP SERPL-CCNC: 66 U/L (ref 34–104)
ALT SERPL W P-5'-P-CCNC: 17 U/L (ref 7–52)
ANION GAP SERPL CALCULATED.3IONS-SCNC: 8 MMOL/L (ref 4–13)
AST SERPL W P-5'-P-CCNC: 17 U/L (ref 13–39)
BASOPHILS # BLD AUTO: 0.04 THOUSANDS/ÂΜL (ref 0–0.1)
BASOPHILS NFR BLD AUTO: 1 % (ref 0–1)
BILIRUB SERPL-MCNC: 0.51 MG/DL (ref 0.2–1)
BUN SERPL-MCNC: 41 MG/DL (ref 5–25)
CALCIUM SERPL-MCNC: 9.4 MG/DL (ref 8.4–10.2)
CHLORIDE SERPL-SCNC: 100 MMOL/L (ref 96–108)
CHOLEST SERPL-MCNC: 135 MG/DL
CO2 SERPL-SCNC: 24 MMOL/L (ref 21–32)
CREAT SERPL-MCNC: 1.58 MG/DL (ref 0.6–1.3)
EOSINOPHIL # BLD AUTO: 0.21 THOUSAND/ÂΜL (ref 0–0.61)
EOSINOPHIL NFR BLD AUTO: 5 % (ref 0–6)
ERYTHROCYTE [DISTWIDTH] IN BLOOD BY AUTOMATED COUNT: 12.6 % (ref 11.6–15.1)
GFR SERPL CREATININE-BSD FRML MDRD: 47 ML/MIN/1.73SQ M
GLUCOSE SERPL-MCNC: 154 MG/DL (ref 65–140)
HCT VFR BLD AUTO: 39.7 % (ref 36.5–49.3)
HDLC SERPL-MCNC: 33 MG/DL
HGB BLD-MCNC: 13.2 G/DL (ref 12–17)
IMM GRANULOCYTES # BLD AUTO: 0.01 THOUSAND/UL (ref 0–0.2)
IMM GRANULOCYTES NFR BLD AUTO: 0 % (ref 0–2)
LDLC SERPL CALC-MCNC: 71 MG/DL (ref 0–100)
LYMPHOCYTES # BLD AUTO: 1.09 THOUSANDS/ÂΜL (ref 0.6–4.47)
LYMPHOCYTES NFR BLD AUTO: 27 % (ref 14–44)
MCH RBC QN AUTO: 30.6 PG (ref 26.8–34.3)
MCHC RBC AUTO-ENTMCNC: 33.2 G/DL (ref 31.4–37.4)
MCV RBC AUTO: 92 FL (ref 82–98)
MONOCYTES # BLD AUTO: 0.43 THOUSAND/ÂΜL (ref 0.17–1.22)
MONOCYTES NFR BLD AUTO: 11 % (ref 4–12)
NEUTROPHILS # BLD AUTO: 2.26 THOUSANDS/ÂΜL (ref 1.85–7.62)
NEUTS SEG NFR BLD AUTO: 56 % (ref 43–75)
NONHDLC SERPL-MCNC: 102 MG/DL
NRBC BLD AUTO-RTO: 0 /100 WBCS
PLATELET # BLD AUTO: 224 THOUSANDS/UL (ref 149–390)
PMV BLD AUTO: 10.4 FL (ref 8.9–12.7)
POTASSIUM SERPL-SCNC: 4.6 MMOL/L (ref 3.5–5.3)
PROT SERPL-MCNC: 7.6 G/DL (ref 6.4–8.4)
RBC # BLD AUTO: 4.32 MILLION/UL (ref 3.88–5.62)
SODIUM SERPL-SCNC: 132 MMOL/L (ref 135–147)
TRIGL SERPL-MCNC: 153 MG/DL
WBC # BLD AUTO: 4.04 THOUSAND/UL (ref 4.31–10.16)

## 2023-01-30 NOTE — PROGRESS NOTES
Gem Redman 59 y o  male MRN: [id number]    Encounter: [id number]      Assessment/Plan     1  Type 2 DM c/b DPN, history of diabetic foot s/p amputation, CKD3b - diabetes is in fair control  I discussed dosing recommendations for ozempic, which is typically started at 0 25 mg weekly for a month prior to increasing to 0 5 mg weekly dosing thereafter  Patient happy with level of BG control, prefers no changes today  Will continue ozempic and jardiance at present dosing  Follow up A1c result from lab collected today  Continue 1-2x daily fingersticks @ scattering times  Call clinic with concerns  Follow up BMP, and A1c prior to next visit  2  CKD3b - stable    3  Hypertension - controlled  Continue present therapy    Problem List Items Addressed This Visit        Endocrine    Diabetes mellitus ([facility] [address] ) - Primary    Relevant Orders    Basic metabolic panel Lab Collect    Microalbumin / creatinine urine ratio Lab Collect    HEMOGLOBIN A1C W/ EAG ESTIMATION Lab Collect       Cardiovascular and Mediastinum    Essential hypertension       Nervous and Auditory    Neuropathy       Genitourinary    Chronic kidney disease, stage 3 (HCC)       Other    H/O diabetic foot ulcer     RTC 4-months    CC: Diabetes    History of Present Illness     HPI:    Return for follow up of diabetes  Last visit 9/9/2022  No interval changes since last visit  He is following with Dr Dylan Nunes for his eye examination  He may have findings of retinopathy on a recent exam which will be followed up on  For diabetes he takes jardiance 25 mg daily, ozempic 0 25 mg weekly  He has been taking and tolerating ozempic at 0 25 mg weekly dosing for nearly a year  No meter or log today  BG by self-recall in 120-150 range  He denies polyuria or polydipsia  BGs are typically measured at scattering times throughout the day  For hypertension he takes carvedilol 25 mg bid and lisinopril-hctz 20-12 5 mg daily       Review of Systems   Constitutional: Negative for diaphoresis and unexpected weight change  HENT: Negative for trouble swallowing and voice change  Eyes: Negative for visual disturbance  Respiratory: Negative for shortness of breath  Gastrointestinal: Negative for nausea and vomiting  Endocrine: Negative for polydipsia and polyuria  Neurological: Negative for tremors and weakness  Psychiatric/Behavioral: Negative for agitation and behavioral problems  All other systems reviewed and are negative  Historical Information   Past Medical History:   Diagnosis Date   • H/O eye surgery    • High blood sugar    • Hypertension      Past Surgical History:   Procedure Laterality Date   • HERNIA REPAIR     • KIDNEY SURGERY     • MOUTH SURGERY     • DE AMPUTATION METATARSAL W/TOE SINGLE Left 6/1/2022    Procedure: RAY RESECTION FOOT;  Surgeon: Rey Estes DPM;  Location: [facility];  Service: Podiatry   • WOUND DEBRIDEMENT Left 4/28/2022    Procedure: Left foot washout;  Surgeon: Rey Estes DPM;  [facility];  Service: Podiatry   • WOUND DEBRIDEMENT Left 6/6/2022    Procedure: DEBRIDEMENT WOUND [facility] OUT); Surgeon: Rey Estes DPM;  Location[facility];  Service: Podiatry     Social History   Social History     Substance and Sexual Activity   Alcohol Use Yes    Comment: ocassional     Social History     Substance and Sexual Activity   Drug Use Never     Social History     Tobacco Use   Smoking Status Never   Smokeless Tobacco Never     Family History: History reviewed  No pertinent family history      Meds/Allergies   Current Outpatient Medications   Medication Sig Dispense Refill   • Accu-Chek FastClix Lancets MISC Use to test blood sugar once a day 102 each 1   • Alpha-Lipoic Acid 600 MG CAPS Take 600 mg by mouth 2 (two) times a day       • Apple Cider Vinegar 300 MG TABS Take 300 mg by mouth daily       • Ascorbic Acid (vitamin C) 1000 MG tablet Take 1,000 mg by mouth 2 (two) times a day 2 tablet twice a day     • BENFOTIAMINE PO Take 900 mg by mouth 2 (two) times a day     • beta carotene 30 MG capsule Take 30 mg by mouth 2 (two) times a day       • Blood Glucose Monitoring Suppl (Accu-Chek Guide Me) w/Device KIT Use to check blood sugar once a day 1 kit 0   • carvedilol (COREG) 25 mg tablet Take 1 tablet (25 mg total) by mouth 2 (two) times a day with meals 180 tablet 3   • Empagliflozin (Jardiance) 25 MG TABS Take 1 tablet (25 mg total) by mouth daily 90 tablet 3   • Garlic 9597 MG CAPS Take by mouth 2 (two) times a day      • glucose blood (Accu-Chek Guide) test strip Use to check blood sugar once a day 100 strip 3   • lidocaine (Lidoderm) 5 % Apply 1 patch topically daily Remove & Discard patch within 12 hours or as directed by MD 10 patch 0   • lisinopril-hydrochlorothiazide (PRINZIDE,ZESTORETIC) 20-12 5 MG per tablet Take 1 tablet by mouth 2 (two) times a day 180 tablet 3   • prednisoLONE acetate (PRED FORTE) 1 % ophthalmic suspension      • Semaglutide,0 25 or 0 5MG/DOS, (Ozempic, 0 25 or 0 5 MG/DOSE,) 2 MG/1 5ML SOPN Inject 0 25 mg under the skin once a week 15 mL 3   • traMADol (Ultram) 50 mg tablet 2 tablets in evening 60 tablet 0   • Turmeric (QC TUMERIC COMPLEX PO) Take 1,000 mg by mouth once Tumeric /curcimin     • VITAMIN D PO Take by mouth 2 (two) times a day     • vitamin E, tocopherol, 400 units capsule Take 400 Units by mouth 2 (two) times a day     • Zinc 50 MG CAPS Take by mouth 2 (two) times a day        No current facility-administered medications for this visit  Allergies   Allergen Reactions   • Cephalexin Hives     Skin peeling  Tolerates penicillins (tolerated unasyn and zosyn)   • Metformin GI Intolerance       Objective   Vitals: Blood pressure 120/78, pulse 71, height 6' (1 829 m), weight 122 kg (270 lb)  Physical Exam  Vitals reviewed  Constitutional:       Appearance: Normal appearance  HENT:      Head: Normocephalic and atraumatic        Nose: Nose normal    Eyes:      General: No scleral icterus  Conjunctiva/sclera: Conjunctivae normal    Cardiovascular:      Rate and Rhythm: Normal rate and regular rhythm  Pulmonary:      Effort: Pulmonary effort is normal  No respiratory distress  Musculoskeletal:      Cervical back: Normal range of motion  Neurological:      General: No focal deficit present  Mental Status: He is alert  Psychiatric:         Mood and Affect: Mood normal          Behavior: Behavior normal          The history was obtained from the review of the chart, patient      Lab Results:   Lab Results   Component Value Date/Time    Hemoglobin A1C 6 8 (H) 07/26/2022 10:37 AM    Hemoglobin A1C 6 6 (H) 04/26/2022 04:15 AM    WBC 4 04 (L) 01/30/2023 01:03 PM    WBC 5 25 09/16/2022 12:11 PM    WBC 5 17 07/26/2022 10:37 AM    Hemoglobin 13 2 01/30/2023 01:03 PM    Hemoglobin 14 3 09/16/2022 12:11 PM    Hemoglobin 12 8 07/26/2022 10:37 AM    Hematocrit 39 7 01/30/2023 01:03 PM    Hematocrit 42 4 09/16/2022 12:11 PM    Hematocrit 39 5 07/26/2022 10:37 AM    MCV 92 01/30/2023 01:03 PM    MCV 91 09/16/2022 12:11 PM    MCV 91 07/26/2022 10:37 AM    Platelets 048 81/90/5106 01:03 PM    Platelets 430 64/41/9801 12:11 PM    Platelets 578 97/50/8833 10:37 AM    BUN 41 (H) 01/30/2023 01:03 PM    BUN 39 (H) 09/16/2022 12:11 PM    BUN 29 (H) 07/26/2022 10:37 AM    Potassium 4 6 01/30/2023 01:03 PM    Potassium 5 0 09/16/2022 12:11 PM    Potassium 5 3 07/26/2022 10:37 AM    Chloride 100 01/30/2023 01:03 PM    Chloride 97 09/16/2022 12:11 PM    Chloride 97 07/26/2022 10:37 AM    CO2 24 01/30/2023 01:03 PM    CO2 28 09/16/2022 12:11 PM    CO2 26 07/26/2022 10:37 AM    Creatinine 1 58 (H) 01/30/2023 01:03 PM    Creatinine 1 50 (H) 09/16/2022 12:11 PM    Creatinine 1 72 (H) 07/26/2022 10:37 AM    AST 17 01/30/2023 01:03 PM    AST 18 09/16/2022 12:11 PM    AST 18 07/26/2022 10:37 AM    ALT 17 01/30/2023 01:03 PM    ALT 27 09/16/2022 12:11 PM    ALT 25 07/26/2022 10:37 AM    Albumin 4 4 01/30/2023 01:03 PM    Albumin 4 1 09/16/2022 12:11 PM    Albumin 3 8 07/26/2022 10:37 AM    HDL, Direct 33 (L) 01/30/2023 01:03 PM    HDL, Direct 35 (L) 09/16/2022 12:11 PM    HDL, Direct 35 (L) 07/26/2022 10:37 AM    Triglycerides 153 (H) 01/30/2023 01:03 PM    Triglycerides 114 09/16/2022 12:11 PM    Triglycerides 130 07/26/2022 10:37 AM           Imaging Studies: I have personally reviewed pertinent reports  Portions of the record may have been created with voice recognition software  Occasional wrong word or "sound a like" substitutions may have occurred due to the inherent limitations of voice recognition software  Read the chart carefully and recognize, using context, where substitutions have occurred

## 2023-01-31 LAB
EST. AVERAGE GLUCOSE BLD GHB EST-MCNC: 146 MG/DL
HBA1C MFR BLD: 6.7 %

## 2023-02-22 DIAGNOSIS — M79.605 LEFT LEG PAIN: ICD-10-CM

## 2023-02-22 RX ORDER — TRAMADOL HYDROCHLORIDE 50 MG/1
TABLET ORAL
Qty: 60 TABLET | Refills: 0 | Status: SHIPPED | OUTPATIENT
Start: 2023-02-22

## 2023-02-23 ENCOUNTER — PATIENT OUTREACH (OUTPATIENT)
Dept: INTERNAL MEDICINE CLINIC | Facility: CLINIC | Age: 58
End: 2023-02-23

## 2023-03-16 ENCOUNTER — RA CDI HCC (OUTPATIENT)
Dept: OTHER | Facility: HOSPITAL | Age: 58
End: 2023-03-16

## 2023-03-16 NOTE — PROGRESS NOTES
E11 22, E11 42  [facility]  coding opportunities          Chart Reviewed number of suggestions sent to Provider: 2     Patients Insurance     Medicare Insurance: [employer]

## 2023-03-23 ENCOUNTER — OFFICE VISIT (OUTPATIENT)
Dept: INTERNAL MEDICINE CLINIC | Facility: CLINIC | Age: 58
End: 2023-03-23

## 2023-03-23 VITALS
HEIGHT: 72 IN | WEIGHT: 268.8 LBS | SYSTOLIC BLOOD PRESSURE: 124 MMHG | HEART RATE: 122 BPM | DIASTOLIC BLOOD PRESSURE: 74 MMHG | TEMPERATURE: 98.1 F | BODY MASS INDEX: 36.41 KG/M2 | OXYGEN SATURATION: 98 %

## 2023-03-23 DIAGNOSIS — I10 ESSENTIAL HYPERTENSION: ICD-10-CM

## 2023-03-23 DIAGNOSIS — M79.605 LEFT LEG PAIN: ICD-10-CM

## 2023-03-23 DIAGNOSIS — E11.69 TYPE 2 DIABETES MELLITUS WITH OTHER SPECIFIED COMPLICATION, WITHOUT LONG-TERM CURRENT USE OF INSULIN (HCC): ICD-10-CM

## 2023-03-23 DIAGNOSIS — Z13.29 SCREENING FOR HYPOTHYROIDISM: Primary | ICD-10-CM

## 2023-03-23 DIAGNOSIS — N18.32 STAGE 3B CHRONIC KIDNEY DISEASE (HCC): ICD-10-CM

## 2023-03-23 RX ORDER — TRAMADOL HYDROCHLORIDE 50 MG/1
TABLET ORAL
Qty: 60 TABLET | Refills: 0 | Status: SHIPPED | OUTPATIENT
Start: 2023-03-23

## 2023-03-23 RX ORDER — PREGABALIN 50 MG/1
CAPSULE ORAL
COMMUNITY
Start: 2023-03-17

## 2023-03-23 NOTE — ASSESSMENT & PLAN NOTE
Lab Results   Component Value Date    EGFR 47 01/30/2023    EGFR 50 09/16/2022    EGFR 42 07/26/2022    CREATININE 1 58 (H) 01/30/2023    CREATININE 1 50 (H) 09/16/2022    CREATININE 1 72 (H) 07/26/2022   Most recent creatinine and GFR reviewed remained stable etiology of renal insufficiency is combination of hypertension and diabetes continue surveillance avoid nonsteroidal anti-inflammatories and avoid dehydration state

## 2023-03-23 NOTE — ASSESSMENT & PLAN NOTE
Most recent note from endocrinology regarding patient's type 2 diabetes management reviewed  Recommend continuation of current therapy which is Jardiance and Ozempic  Follow-up with endocrinology on a regular basis recommended    Lab Results   Component Value Date    HGBA1C 6 7 (H) 01/30/2023

## 2023-03-23 NOTE — PROGRESS NOTES
Name: Alissa Shankar      : 1965      MRN: [id number]  Encounter Provider: Flash Pearl MD  Encounter Date: 3/23/2023   Encounter department: [address]     1  Screening for hypothyroidism  -     TSH, 3rd generation with Free T4 reflex; Future    2  Type 2 diabetes mellitus with other specified complication, without long-term current use of insulin ([facility] [address] )  Assessment & Plan:  Most recent note from endocrinology regarding patient's type 2 diabetes management reviewed  Recommend continuation of current therapy which is Jardiance and Ozempic  Follow-up with endocrinology on a regular basis recommended  Lab Results   Component Value Date    HGBA1C 6 7 (H) 2023         3  Stage 3b chronic kidney disease [facility])  Assessment & Plan:  Lab Results   Component Value Date    EGFR 47 2023    EGFR 50 2022    EGFR 42 2022    CREATININE 1 58 (H) 2023    CREATININE 1 50 (H) 2022    CREATININE 1 72 (H) 2022   Most recent creatinine and GFR reviewed remained stable etiology of renal insufficiency is combination of hypertension and diabetes continue surveillance avoid nonsteroidal anti-inflammatories and avoid dehydration state  BMI Counseling: Body mass index is 36 46 kg/m²  The BMI is above normal  Nutrition recommendations include decreasing portion sizes, encouraging healthy choices of fruits and vegetables, moderation in carbohydrate intake, increasing intake of lean protein and reducing intake of cholesterol  Rationale for BMI follow-up plan is due to patient being overweight or obese  Subjective      The patient is back to work part-time  Hours are limited by his disability  He reports an episode earlier today of feeling more anxious and having a feeling of palpitations in his chest   The episode was self-limiting  He has had some palpitations in the past as well    Not clear as to the cause or precipitating factor for the anxiety or palpitations  Patient is continues under the care of his endocrinologist for management of his type 2 diabetes and he should continue his current therapy as well as maintain a healthy balanced diabetic diet  He continues to experience difficulty losing weight  Some of this may have to do with his diabetes  I have requested a thyroid screening test to evaluate for any possible hypothyroid condition  Review of Systems   Cardiovascular: Positive for palpitations  Psychiatric/Behavioral: The patient is nervous/anxious  All other systems reviewed and are negative        Current Outpatient Medications on File Prior to Visit   Medication Sig   • Accu-Chek FastClix Lancets MISC Use to test blood sugar once a day   • Alpha-Lipoic Acid 600 MG CAPS Take 600 mg by mouth 2 (two) times a day     • Apple Cider Vinegar 300 MG TABS Take 300 mg by mouth daily     • Ascorbic Acid (vitamin C) 1000 MG tablet Take 1,000 mg by mouth 2 (two) times a day 2 tablet twice a day   • BENFOTIAMINE PO Take 900 mg by mouth 2 (two) times a day   • beta carotene 30 MG capsule Take 30 mg by mouth 2 (two) times a day     • Blood Glucose Monitoring Suppl (Accu-Chek Guide Me) w/Device KIT Use to check blood sugar once a day   • carvedilol (COREG) 25 mg tablet Take 1 tablet (25 mg total) by mouth 2 (two) times a day with meals   • Empagliflozin (Jardiance) 25 MG TABS Take 1 tablet (25 mg total) by mouth daily   • Garlic 9934 MG CAPS Take by mouth 2 (two) times a day    • glucose blood (Accu-Chek Guide) test strip Use to check blood sugar once a day   • lidocaine (Lidoderm) 5 % Apply 1 patch topically daily Remove & Discard patch within 12 hours or as directed by MD   • lisinopril-hydrochlorothiazide (PRINZIDE,ZESTORETIC) 20-12 5 MG per tablet Take 1 tablet by mouth 2 (two) times a day   • prednisoLONE acetate (PRED FORTE) 1 % ophthalmic suspension    • pregabalin (LYRICA) 50 mg capsule    • Semaglutide,0 25 or 0 5MG/DOS, (Ozempic, 0 25 or 0 5 MG/DOSE,) 2 MG/1 5ML SOPN Inject 0 25 mg under the skin once a week   • Turmeric (QC TUMERIC COMPLEX PO) Take 1,000 mg by mouth once Tumeric /curcimin   • VITAMIN D PO Take by mouth 2 (two) times a day   • vitamin E, tocopherol, 400 units capsule Take 400 Units by mouth 2 (two) times a day   • Zinc 50 MG CAPS Take by mouth 2 (two) times a day    • [DISCONTINUED] traMADol (Ultram) 50 mg tablet 2 tablets in evening       Objective     /74   Pulse (!) 122   Temp 98 1 °F (36 7 °C)   Ht 6' (1 829 m)   Wt 122 kg (268 lb 12 8 oz)   SpO2 98%   BMI 36 46 kg/m²     Physical Exam  Constitutional:       General: He is not in acute distress  Appearance: He is well-developed  He is not ill-appearing  HENT:      Head: Normocephalic  Right Ear: Hearing and external ear normal       Left Ear: Hearing and external ear normal       Nose: Nose normal    Eyes:      Conjunctiva/sclera: Conjunctivae normal       Pupils: Pupils are equal, round, and reactive to light  Neck:      Thyroid: No thyromegaly  Cardiovascular:      Rate and Rhythm: Normal rate and regular rhythm  Heart sounds: Normal heart sounds, S1 normal and S2 normal  No murmur heard  Pulmonary:      Effort: Pulmonary effort is normal       Breath sounds: Normal breath sounds  No wheezing, rhonchi or rales  Abdominal:      General: Bowel sounds are normal       Palpations: Abdomen is soft  Musculoskeletal:         General: Normal range of motion  Lymphadenopathy:      Cervical: No cervical adenopathy  Skin:     General: Skin is warm and dry  Neurological:      Mental Status: He is alert and oriented to person, place, and time  Deep Tendon Reflexes: Reflexes are normal and symmetric  Psychiatric:         Behavior: Behavior normal          Thought Content:  Thought content normal          Judgment: Judgment normal        Flash Pearl MD

## 2023-03-23 NOTE — ASSESSMENT & PLAN NOTE
Assessment of the patient's hypertension today confirms good blood pressure control recommend continuation of current therapy lisinopril hydrochlorothiazide 20-12 5 mg 1 pill twice a day

## 2023-04-26 DIAGNOSIS — M79.605 LEFT LEG PAIN: ICD-10-CM

## 2023-04-26 DIAGNOSIS — E11.69 TYPE 2 DIABETES MELLITUS WITH OTHER SPECIFIED COMPLICATION, WITHOUT LONG-TERM CURRENT USE OF INSULIN (HCC): ICD-10-CM

## 2023-04-26 RX ORDER — TRAMADOL HYDROCHLORIDE 50 MG/1
TABLET ORAL
Qty: 60 TABLET | Refills: 0 | Status: SHIPPED | OUTPATIENT
Start: 2023-04-26

## 2023-04-27 DIAGNOSIS — M79.605 LEFT LEG PAIN: ICD-10-CM

## 2023-04-27 RX ORDER — TRAMADOL HYDROCHLORIDE 50 MG/1
TABLET ORAL
Qty: 60 TABLET | Refills: 0 | OUTPATIENT
Start: 2023-04-27

## 2023-05-22 DIAGNOSIS — M79.605 LEFT LEG PAIN: ICD-10-CM

## 2023-05-22 RX ORDER — TRAMADOL HYDROCHLORIDE 50 MG/1
TABLET ORAL
Qty: 60 TABLET | Refills: 0 | Status: SHIPPED | OUTPATIENT
Start: 2023-05-22

## 2023-05-25 ENCOUNTER — PATIENT OUTREACH (OUTPATIENT)
Dept: INTERNAL MEDICINE CLINIC | Facility: CLINIC | Age: 58
End: 2023-05-25

## 2023-05-27 ENCOUNTER — HOSPITAL ENCOUNTER (EMERGENCY)
Facility: HOSPITAL | Age: 58
Discharge: HOME | End: 2023-05-28
Attending: EMERGENCY MEDICINE | Admitting: EMERGENCY MEDICINE
Payer: MEDICARE

## 2023-05-27 ENCOUNTER — HOSPITAL ENCOUNTER (EMERGENCY)
Facility: HOSPITAL | Age: 58
Discharge: HOME | End: 2023-05-27
Attending: EMERGENCY MEDICINE | Admitting: EMERGENCY MEDICINE
Payer: MEDICARE

## 2023-05-27 ENCOUNTER — APPOINTMENT (EMERGENCY)
Dept: RADIOLOGY | Facility: HOSPITAL | Age: 58
End: 2023-05-27
Payer: MEDICARE

## 2023-05-27 VITALS
HEART RATE: 73 BPM | RESPIRATION RATE: 16 BRPM | DIASTOLIC BLOOD PRESSURE: 71 MMHG | BODY MASS INDEX: 35.28 KG/M2 | TEMPERATURE: 98.1 F | OXYGEN SATURATION: 97 % | WEIGHT: 260.14 LBS | SYSTOLIC BLOOD PRESSURE: 114 MMHG

## 2023-05-27 VITALS
SYSTOLIC BLOOD PRESSURE: 98 MMHG | WEIGHT: 261.47 LBS | HEART RATE: 82 BPM | DIASTOLIC BLOOD PRESSURE: 74 MMHG | RESPIRATION RATE: 16 BRPM | TEMPERATURE: 98 F | OXYGEN SATURATION: 92 % | BODY MASS INDEX: 35.46 KG/M2

## 2023-05-27 DIAGNOSIS — S61.214A LACERATION OF RIGHT RING FINGER WITHOUT FOREIGN BODY WITHOUT DAMAGE TO NAIL, INITIAL ENCOUNTER: Primary | ICD-10-CM

## 2023-05-27 DIAGNOSIS — T81.30XA WOUND DEHISCENCE: Primary | ICD-10-CM

## 2023-05-27 PROCEDURE — 99283 EMERGENCY DEPT VISIT LOW MDM: CPT

## 2023-05-27 PROCEDURE — 73140 X-RAY EXAM OF FINGER(S): CPT

## 2023-05-27 PROCEDURE — 99282 EMERGENCY DEPT VISIT SF MDM: CPT

## 2023-05-27 RX ORDER — LIDOCAINE HYDROCHLORIDE 10 MG/ML
5 INJECTION, SOLUTION EPIDURAL; INFILTRATION; INTRACAUDAL; PERINEURAL ONCE
Status: COMPLETED | OUTPATIENT
Start: 2023-05-27 | End: 2023-05-27

## 2023-05-27 RX ORDER — BACITRACIN, NEOMYCIN, POLYMYXIN B 400; 3.5; 5 [USP'U]/G; MG/G; [USP'U]/G
1 OINTMENT TOPICAL ONCE
Status: COMPLETED | OUTPATIENT
Start: 2023-05-27 | End: 2023-05-27

## 2023-05-27 RX ADMIN — BACITRACIN ZINC, NEOMYCIN, POLYMYXIN B 1 SMALL APPLICATION: 400; 3.5; 5 OINTMENT TOPICAL at 15:56

## 2023-05-27 RX ADMIN — LIDOCAINE HYDROCHLORIDE 5 ML: 10 INJECTION, SOLUTION EPIDURAL; INFILTRATION; INTRACAUDAL; PERINEURAL at 14:36

## 2023-05-27 NOTE — DISCHARGE INSTRUCTIONS
DISCHARGE INSTRUCTIONS:    FOLLOW UP WITH YOUR PRIMARY CARE PROVIDER OR THE [address] [place] [address]  MAKE AN APPOINTMENT TO BE SEEN  TAKE TYLENOL OR MOTRIN FOR PAIN     YOU HAD 6 SUTURES PLACED  YOU WILL NEED TO HAVE THEM REMOVED IN 7-10 DAYS  KEEP THE AREA CLEAN  TOMORROW YOU CAN [address] THE AREA WITH WARM WATER AND SOAP  PAT THE AREA DRY  YOU MAY APPLY NEOSPORIN TO THE AREA  WATCH FOR SIGNS OF INFECTION: REDNESS, SWELLING OR DISCHARGE      IF SYMPTOMS WORSEN OR NEW SYMPTOMS ARISE, RETURN TO THE ER TO BE SEEN

## 2023-05-27 NOTE — ED PROVIDER NOTES
History  Chief Complaint   Patient presents with   • Finger Laceration     Pt picked up a broken bottle and accidentally cut right ring finger      58y  o male with PMH of high blood sugar and HTN presents to the ER for right ring finger laceration occurring just prior to arrival  Patient states he picked up a broken bottle and cut his finger  He took Tylenol earlier today like he normally does  He describes his pain as sharp and non-radiating  Symptoms are constant  He is right hand dominant  He denies fever, chills, URI symptoms, chest pain, dyspnea, N/V/D, abdominal pain, weakness or paresthesias  His last tetanus was within the last 5 years  History provided by:  Patient   used: No        Prior to Admission Medications   Prescriptions Last Dose Informant Patient Reported? Taking?    Accu-Chek FastClix Lancets MISC   No No   Sig: Use to test blood sugar once a day   Alpha-Lipoic Acid 600 MG CAPS  Self Yes No   Sig: Take 600 mg by mouth 2 (two) times a day     Apple Cider Vinegar 300 MG TABS  Self Yes No   Sig: Take 300 mg by mouth daily     Ascorbic Acid (vitamin C) 1000 MG tablet  Self Yes No   Sig: Take 1,000 mg by mouth 2 (two) times a day 2 tablet twice a day   BENFOTIAMINE PO  Self Yes No   Sig: Take 900 mg by mouth 2 (two) times a day   Blood Glucose Monitoring Suppl (Accu-Chek Guide Me) w/Device KIT   No No   Sig: Use to check blood sugar once a day   Empagliflozin (Jardiance) 25 MG TABS  Self No No   Sig: Take 1 tablet (25 mg total) by mouth daily   Garlic 8260 MG CAPS  Self Yes No   Sig: Take by mouth 2 (two) times a day    Turmeric (QC TUMERIC COMPLEX PO)  Self Yes No   Sig: Take 1,000 mg by mouth once Tumeric /curcimin   VITAMIN D PO  Self Yes No   Sig: Take by mouth 2 (two) times a day   Zinc 50 MG CAPS  Self Yes No   Sig: Take by mouth 2 (two) times a day    beta carotene 30 MG capsule  Self Yes No   Sig: Take 30 mg by mouth 2 (two) times a day     carvedilol (COREG) 25 mg tablet  Self No No   Sig: Take 1 tablet (25 mg total) by mouth 2 (two) times a day with meals   glucose blood (Accu-Chek Guide) test strip   No No   Sig: Use to check blood sugar once a day   lidocaine (Lidoderm) 5 %  Self No No   Sig: Apply 1 patch topically daily Remove & Discard patch within 12 hours or as directed by MD   lisinopril-hydrochlorothiazide (PRINZIDE,ZESTORETIC) 20-12 5 MG per tablet  Self No No   Sig: Take 1 tablet by mouth 2 (two) times a day   prednisoLONE acetate (PRED FORTE) 1 % ophthalmic suspension  Self Yes No   pregabalin (LYRICA) 50 mg capsule   Yes No   semaglutide, 0 25 or 0 5 mg/dose, (Ozempic, 0 25 or 0 5 MG/DOSE,) 2 mg/1 5 mL injection pen   No No   Sig: Inject 0 19 mL (0 25 mg total) under the skin once a week   traMADol (Ultram) 50 mg tablet   No No   Si tablets in evening   vitamin E, tocopherol, 400 units capsule  Self Yes No   Sig: Take 400 Units by mouth 2 (two) times a day      Facility-Administered Medications: None       Past Medical History:   Diagnosis Date   • H/O eye surgery    • High blood sugar    • Hypertension        Past Surgical History:   Procedure Laterality Date   • HERNIA REPAIR     • KIDNEY SURGERY     • MOUTH SURGERY     • [place] AMPUTATION METATARSAL W/TOE SINGLE Left 2022    Procedure: RAY RESECTION FOOT;  Surgeon: Villa Townsend DPM;  Location: [facility];  Service: Podiatry   • WOUND DEBRIDEMENT Left 2022    Procedure: Left foot washout;  Surgeon: Villa Townsend DPM;  Location[facility];  Service: Podiatry   • WOUND DEBRIDEMENT Left 2022    Procedure: DEBRIDEMENT WOUND [facility] OUT); Surgeon: Villa Townsend DPM;  Location[facility];  Service: Podiatry       History reviewed  No pertinent family history  I have reviewed and agree with the history as documented      E-Cigarette/Vaping   • E-Cigarette Use Never User      E-Cigarette/Vaping Substances   • Nicotine No    • THC No    • CBD No    • Flavoring No    • Other No    • Unknown No Social History     Tobacco Use   • Smoking status: Never   • Smokeless tobacco: Never   Vaping Use   • Vaping Use: Never used   Substance Use Topics   • Alcohol use: Yes     Comment: ocassional   • Drug use: Never       Review of Systems   Constitutional: Negative for activity change, appetite change, chills and fever  HENT: Negative for congestion, drooling, ear discharge, ear pain, facial swelling, rhinorrhea and sore throat  Eyes: Negative for redness  Respiratory: Negative for cough and shortness of breath  Cardiovascular: Negative for chest pain  Gastrointestinal: Negative for abdominal pain, diarrhea, nausea and vomiting  Musculoskeletal: Negative for neck stiffness  Skin: Positive for wound (right ring finger laceration)  Negative for rash  Allergic/Immunologic: Negative for food allergies  Neurological: Negative for weakness and numbness  Physical Exam  Physical Exam  Vitals and nursing note reviewed  Constitutional:       General: He is not in acute distress  Appearance: He is not toxic-appearing  HENT:      Head: Normocephalic and atraumatic  Eyes:      Conjunctiva/sclera: Conjunctivae normal    Neck:      Trachea: No tracheal deviation  Cardiovascular:      Rate and Rhythm: Normal rate  Pulmonary:      Effort: Pulmonary effort is normal  No respiratory distress  Abdominal:      General: There is no distension  Musculoskeletal:      Right wrist: Normal       Right hand: Laceration (ring finger) and tenderness present  No swelling, deformity or bony tenderness  Normal range of motion  Normal sensation  Normal pulse  Hands:       Cervical back: Normal range of motion and neck supple  Skin:     General: Skin is warm and dry  Findings: No rash  Neurological:      Mental Status: He is alert  GCS: GCS eye subscore is 4  GCS verbal subscore is 5  GCS motor subscore is 6     Psychiatric:         Mood and Affect: Mood normal          Vital Signs  ED Triage Vitals [05/27/23 1407]   Temperature Pulse Respirations Blood Pressure SpO2   98 °F (36 7 °C) 82 16 98/74 92 %      Temp Source Heart Rate Source Patient Position - Orthostatic VS BP Location FiO2 (%)   Oral Monitor -- Right arm --      Pain Score       --           Vitals:    05/27/23 1407   BP: 98/74   Pulse: 82         Visual Acuity      ED Medications  Medications   lidocaine (PF) (XYLOCAINE-MPF) 1 % injection 5 mL (5 mL Infiltration Given by Other 5/27/23 1436)   neomycin-bacitracin-polymyxin b (NEOSPORIN) ointment 1 small application (1 small application Topical Given 5/27/23 8096)       Diagnostic Studies  Results Reviewed     None                 XR finger fourth digit-ring RIGHT    (Results Pending)              Procedures  Laceration repair    Date/Time: 5/27/2023 3:20 PM    Performed by: Taqueria Mccarty PA-C  Authorized by: Taqueria Mccarty PA-C  Consent: Verbal consent obtained  Consent given by: patient  Patient understanding: patient states understanding of the procedure being performed  Radiology Images displayed and confirmed  If images not available, report reviewed: imaging studies available  Patient identity confirmed: arm band  Body area: upper extremity  Location details: right ring finger  Laceration length: 1 5 cm  Foreign bodies: no foreign bodies  Anesthesia: local infiltration    Anesthesia:  Local Anesthetic: lidocaine 1% without epinephrine  Anesthetic total: 3 mL    Sedation:  Patient sedated: no        Procedure Details:  Preparation: Patient was prepped and draped in the usual sterile fashion    Irrigation solution: saline  Amount of cleaning: standard  Skin closure: 5-0 nylon  Number of sutures: 6  Technique: simple  Approximation: close  Approximation difficulty: simple  Dressing: antibiotic ointment and 4x4 sterile gauze  Patient tolerance: patient tolerated the procedure well with no immediate complications               ED Course  ED Course as of 05/27/23 1610   Sat May 27, 2023   1514 XR finger fourth digit-ring RIGHT  No acute abnormalities seen by me at this time  SBIRT 20yo+    Flowsheet Row Most Recent Value   Initial Alcohol Screen: US AUDIT-C     1  How often do you have a drink containing alcohol? 0 Filed at: 05/27/2023 1407   2  How many drinks containing alcohol do you have on a typical day you are drinking? 0 Filed at: 05/27/2023 1407   3a  Male UNDER 65: How often do you have five or more drinks on one occasion? 0 Filed at: 05/27/2023 1407   3b  FEMALE Any Age, or MALE 65+: How often do you have 4 or more drinks on one occassion? 0 Filed at: 05/27/2023 1407   Audit-C Score 0 Filed at: 05/27/2023 1407   GARCÍA: How many times in the past year have you    Used an illegal drug or used a prescription medication for non-medical reasons? Never Filed at: 05/27/2023 1407                    Medical Decision Making  58y  o male presents to the ER for right ring finger laceration occurring prior to arrival  Vitals are stable  Patient is in no acute distress  On exam, breathing is non-labored  Abdomen is not distended  1 5cm laceration with active bleeding seen on the right ring finger  No erythema, swelling or deformity  Area is tender to palpation  Normal ROM of fingers  Neurovascularly intact  Will check imaging  Will close laceration  1514 XR finger fourth digit-ring RIGHT - No acute abnormalities seen by me at this time  Will close laceration  1540    Laceration closed without complications  Will discharge  Patient agreeable  The management plan was discussed in detail with the patient at bedside and all questions were answered  Prior to discharge, we provided both verbal and written instructions  We discussed with the patient the signs and symptoms for which to return to the emergency department  All questions were answered and patient was comfortable with the plan of care and discharged to home    Instructed the patient to follow up with the primary care provider and/or specialist provided and their written instructions  The patient verbalized understanding of our discussion and plan of care, and agrees to return to the Emergency Department for concerns and progression of illness  At discharge, I instructed the patient to:  -follow up with pcp  -take Tylenol or Motrin for pain  -have the 6 sutures removed in 7-10 days  -keep the area clean  -apply Neosporin to the area  -watch for signs of infection: redness, swelling or discharge  -return to the ER if symptoms worsened or new symptoms arose  Patient agreed to this plan and was stable at time of discharge  Laceration of right ring finger without foreign body without damage to nail, initial encounter: acute illness or injury  Amount and/or Complexity of Data Reviewed  Independent Historian:      Details: Patient is historian  Radiology: ordered and independent interpretation performed  Decision-making details documented in ED Course  Risk  OTC drugs  Prescription drug management  Disposition  Final diagnoses:   Laceration of right ring finger without foreign body without damage to nail, initial encounter     Time reflects when diagnosis was documented in both MDM as applicable and the Disposition within this note     Time User Action Codes Description Comment    5/27/2023  3:14 PM Cecily LOMAS Add [[id number]] Laceration of right ring finger without foreign body without damage to nail, initial encounter       ED Disposition     ED Disposition   Discharge    Condition   Stable    Date/Time   Sat May 27, 2023  3:14 PM    Comment   Angélica Farias discharge to home/self care                 Follow-up Information     Follow up With Specialties Details Why Contact Info Additional Information    Corie Sandoval MD Internal Medicine Schedule an appointment as soon as possible for a visit  As needed [address]  2nd Floor, [facility] [address] [facility] Go in 1 week For suture removal [address], [place] [address]  [phone] [address] Now [place], [phone]     Via the [address] (North/South) Take [address] toward [place]  Take the [facility] [address]  Keep right and follow signs for [address]/ [place]  Merge onto [address] [place] [id number]  In a half mile, take the exit for [address] toward [facility]  In 0 7 miles take the [facility] [employer]  Merge onto [facility]  In 500 feet, turn left on [address] and drive 0 3 miles  [address] will be on your left  [address]) [address] toward [place]  Take the [place] [employer]  Merge onto [facility]  In 500 feet, turn left on [address] and drive 0 3 miles  [address] will be on your left  [address]) [address] to [address] towards [facility]  In 0 7 miles take the [facility] [employer]  Merge onto [facility]  In 500 feet, turn left on [address] and drive 0 3 miles  [address] will be on your left  Patient's Medications   Discharge Prescriptions    No medications on file       No discharge procedures on file      PDMP Review       Value Time User    PDMP Reviewed  Yes 5/22/2023  3:21 PM Snow Pate MD          ED Provider  Electronically Signed by           Dc Vasquez PA-C  05/27/23 7256

## 2023-05-28 NOTE — ED PROVIDER NOTES
History  Chief Complaint   Patient presents with   • Wound Dehiscence     Patient reports he got stitches earlier for a broken bottle but this evening he was trying to break up the dog and the cat from fighting and it ripped out the stitches in finger  58 YO male presents for evaluation after re-injuring his finger  Patient was seen 10 hours ago after sustaining a laceration to the Right ring finger, he had sutures placed  Patient states he was breaking up a fight between two of his animals and feels he had stitches ripped out  He has had significant bleeding from the wound, did dress this with pressure  He does not taken anticoagulation  Patient denies any other injuries  Pt denies CP/SOB/F/C/N/V/D/C, no dysuria, burning on urination or blood in urine  History provided by:  Patient   used: No        Prior to Admission Medications   Prescriptions Last Dose Informant Patient Reported? Taking?    Accu-Chek FastClix Lancets MISC   No No   Sig: Use to test blood sugar once a day   Alpha-Lipoic Acid 600 MG CAPS  Self Yes No   Sig: Take 600 mg by mouth 2 (two) times a day     Apple Cider Vinegar 300 MG TABS  Self Yes No   Sig: Take 300 mg by mouth daily     Ascorbic Acid (vitamin C) 1000 MG tablet  Self Yes No   Sig: Take 1,000 mg by mouth 2 (two) times a day 2 tablet twice a day   BENFOTIAMINE PO  Self Yes No   Sig: Take 900 mg by mouth 2 (two) times a day   Blood Glucose Monitoring Suppl (Accu-Chek Guide Me) w/Device KIT   No No   Sig: Use to check blood sugar once a day   Empagliflozin (Jardiance) 25 MG TABS  Self No No   Sig: Take 1 tablet (25 mg total) by mouth daily   Garlic 8985 MG CAPS  Self Yes No   Sig: Take by mouth 2 (two) times a day    Turmeric (QC TUMERIC COMPLEX PO)  Self Yes No   Sig: Take 1,000 mg by mouth once Tumeric /curcimin   VITAMIN D PO  Self Yes No   Sig: Take by mouth 2 (two) times a day   Zinc 50 MG CAPS  Self Yes No   Sig: Take by mouth 2 (two) times a day    beta carotene 30 MG capsule  Self Yes No   Sig: Take 30 mg by mouth 2 (two) times a day     carvedilol (COREG) 25 mg tablet  Self No No   Sig: Take 1 tablet (25 mg total) by mouth 2 (two) times a day with meals   glucose blood (Accu-Chek Guide) test strip   No No   Sig: Use to check blood sugar once a day   lidocaine (Lidoderm) 5 %  Self No No   Sig: Apply 1 patch topically daily Remove & Discard patch within 12 hours or as directed by MD   lisinopril-hydrochlorothiazide (PRINZIDE,ZESTORETIC) 20-12 5 MG per tablet  Self No No   Sig: Take 1 tablet by mouth 2 (two) times a day   prednisoLONE acetate (PRED FORTE) 1 % ophthalmic suspension  Self Yes No   pregabalin (LYRICA) 50 mg capsule   Yes No   semaglutide, 0 25 or 0 5 mg/dose, (Ozempic, 0 25 or 0 5 MG/DOSE,) 2 mg/1 5 mL injection pen   No No   Sig: Inject 0 19 mL (0 25 mg total) under the skin once a week   traMADol (Ultram) 50 mg tablet   No No   Si tablets in evening   vitamin E, tocopherol, 400 units capsule  Self Yes No   Sig: Take 400 Units by mouth 2 (two) times a day      Facility-Administered Medications: None       Past Medical History:   Diagnosis Date   • H/O eye surgery    • High blood sugar    • Hypertension        Past Surgical History:   Procedure Laterality Date   • HERNIA REPAIR     • KIDNEY SURGERY     • MOUTH SURGERY     • FL AMPUTATION METATARSAL W/TOE SINGLE Left 2022    Procedure: RAY RESECTION FOOT;  Surgeon: Robb Cunningham DPM;  [facility];  Service: Podiatry   • WOUND DEBRIDEMENT Left 2022    Procedure: Left foot washout;  Surgeon: Robb Cunningham DPM;  [facility];  Service: Podiatry   • WOUND DEBRIDEMENT Left 2022    Procedure: DEBRIDEMENT WOUND [facility] OUT); Surgeon: Robb Cunningham DPM;  [facility];  Service: Podiatry       History reviewed  No pertinent family history  I have reviewed and agree with the history as documented      E-Cigarette/Vaping   • E-Cigarette Use Never User E-Cigarette/Vaping Substances   • Nicotine No    • THC No    • CBD No    • Flavoring No    • Other No    • Unknown No      Social History     Tobacco Use   • Smoking status: Never   • Smokeless tobacco: Never   Vaping Use   • Vaping Use: Never used   Substance Use Topics   • Alcohol use: Yes     Comment: ocassional   • Drug use: Never       Review of Systems   Constitutional: Negative for fever  HENT: Negative for dental problem  Eyes: Negative for visual disturbance  Respiratory: Negative for shortness of breath  Cardiovascular: Negative for chest pain  Gastrointestinal: Negative for abdominal pain, nausea and vomiting  Genitourinary: Negative for dysuria and frequency  Musculoskeletal: Negative for neck pain and neck stiffness  Skin: Positive for wound  Negative for rash  Neurological: Negative for dizziness, weakness and light-headedness  Psychiatric/Behavioral: Negative for agitation, behavioral problems and confusion  All other systems reviewed and are negative  Physical Exam  Physical Exam  Vitals and nursing note reviewed  Constitutional:       Appearance: He is well-developed  HENT:      Head: Normocephalic and atraumatic  Eyes:      Extraocular Movements: Extraocular movements intact  Cardiovascular:      Rate and Rhythm: Normal rate  Pulmonary:      Effort: Pulmonary effort is normal    Abdominal:      General: There is no distension  Musculoskeletal:         General: Normal range of motion  Cervical back: Normal range of motion  Comments: Right 4th digit dressing soaked in bloody dressing  On taking this down, note laceration with sutures, some oozing of blood from lateral aspect, no significant bleeding  Sutures intact  Skin:     Findings: No rash  Neurological:      Mental Status: He is alert and oriented to person, place, and time     Psychiatric:         Behavior: Behavior normal          Vital Signs  ED Triage Vitals [05/27/23 2343] Temperature Pulse Respirations Blood Pressure SpO2   98 1 °F (36 7 °C) 73 16 114/71 97 %      Temp Source Heart Rate Source Patient Position - Orthostatic VS BP Location FiO2 (%)   Oral Monitor Sitting Right arm --      Pain Score       3           Vitals:    05/27/23 2343   BP: 114/71   Pulse: 73   Patient Position - Orthostatic VS: Sitting         Visual Acuity      ED Medications  Medications - No data to display    Diagnostic Studies  Results Reviewed     None                 No orders to display              Procedures  Splint application    Date/Time: 5/28/2023 12:43 AM    Performed by: Aisha Wooten MD  Authorized by: Aisha Wooten MD  Universal Protocol:  Consent: Verbal consent not obtained  Consent given by: patient      Pre-procedure details:     Sensation:  Normal  Procedure details:     Laterality:  Right    Location:  Finger    Finger:  R ring finger    Splint type:  Finger splint, static    Supplies:  Aluminum splint  Post-procedure details:     Pain:  Unchanged    Sensation:  Normal    Patient tolerance of procedure: Tolerated well, no immediate complications  Comments:      Laceration covered in Surgicel to assist with healing, aluminum finger splint applied over this to protect finger and prevent subsequent injury  ED Course                               SBIRT 20yo+    Flowsheet Row Most Recent Value   Initial Alcohol Screen: US AUDIT-C     1  How often do you have a drink containing alcohol? 0 Filed at: 05/27/2023 2357   2  How many drinks containing alcohol do you have on a typical day you are drinking? 0 Filed at: 05/27/2023 2357   3a  Male UNDER 65: How often do you have five or more drinks on one occasion? 0 Filed at: 05/27/2023 2357   Audit-C Score 0 Filed at: 05/27/2023 2357   GARCÍA: How many times in the past year have you    Used an illegal drug or used a prescription medication for non-medical reasons?  Never Filed at: 05/27/2023 2357                    Medical Decision Making  1  Finger laceration - Patient with previous finger laceration, bleeding after re-injury, currently only mild oozing  Will redress with Surgicel, place splint for protection  Wound dehiscence: acute illness or injury  Amount and/or Complexity of Data Reviewed  External Data Reviewed: notes  Disposition  Final diagnoses:   Wound dehiscence     Time reflects when diagnosis was documented in both MDM as applicable and the Disposition within this note     Time User Action Codes Description Comment    5/28/2023 12:24 AM Harmeet Case E Add [[id number]] Wound dehiscence       ED Disposition     ED Disposition   Discharge    Condition   Stable    Date/Time   Sun May 28, 2023 12:24 AM    Comment   Zee Cure discharge to home/self care                 Follow-up Information     Follow up With Specialties Details Why [address] [facility] MD Internal Medicine   [address]  [address], [address] [facility],[address]  [phone]            Discharge Medication List as of 5/28/2023 12:24 AM      CONTINUE these medications which have NOT CHANGED    Details   Accu-Chek FastClix Lancets MISC Use to test blood sugar once a day, Normal      Alpha-Lipoic Acid 600 MG CAPS Take 600 mg by mouth 2 (two) times a day  , Historical Med      Apple Cider Vinegar 300 MG TABS Take 300 mg by mouth daily  , Historical Med      Ascorbic Acid (vitamin C) 1000 MG tablet Take 1,000 mg by mouth 2 (two) times a day 2 tablet twice a day, Historical Med      BENFOTIAMINE PO Take 900 mg by mouth 2 (two) times a day, Historical Med      beta carotene 30 MG capsule Take 30 mg by mouth 2 (two) times a day  , Historical Med      Blood Glucose Monitoring Suppl (Accu-Chek Guide Me) w/Device KIT Use to check blood sugar once a day, Normal      carvedilol (COREG) 25 mg tablet Take 1 tablet (25 mg total) by mouth 2 (two) times a day with meals, Starting Mon 8/15/2022, Normal      Empagliflozin (Jardiance) 25 MG TABS Take 1 tablet (25 mg total) by mouth daily, Starting Fri 9/9/2022, Normal      Garlic 8316 MG CAPS Take by mouth 2 (two) times a day , Historical Med      glucose blood (Accu-Chek Guide) test strip Use to check blood sugar once a day, Normal      lidocaine (Lidoderm) 5 % Apply 1 patch topically daily Remove & Discard patch within 12 hours or as directed by MD, Starting Fri 7/29/2022, Normal      lisinopril-hydrochlorothiazide (PRINZIDE,ZESTORETIC) 20-12 5 MG per tablet Take 1 tablet by mouth 2 (two) times a day, Starting Mon 8/15/2022, Normal      prednisoLONE acetate (PRED FORTE) 1 % ophthalmic suspension Starting Thu 8/18/2022, Historical Med      pregabalin (LYRICA) 50 mg capsule Starting Fri 3/17/2023, Historical Med      semaglutide, 0 25 or 0 5 mg/dose, (Ozempic, 0 25 or 0 5 MG/DOSE,) 2 mg/1 5 mL injection pen Inject 0 19 mL (0 25 mg total) under the skin once a week, Starting Wed 4/26/2023, Normal      traMADol (Ultram) 50 mg tablet 2 tablets in evening, Normal      Turmeric (QC TUMERIC COMPLEX PO) Take 1,000 mg by mouth once Tumeric /curcimin, Historical Med      VITAMIN D PO Take by mouth 2 (two) times a day, Historical Med      vitamin E, tocopherol, 400 units capsule Take 400 Units by mouth 2 (two) times a day, Historical Med      Zinc 50 MG CAPS Take by mouth 2 (two) times a day , Historical Med             No discharge procedures on file      PDMP Review       Value Time User    PDMP Reviewed  Yes 5/22/2023  3:21 PM Nasim Mott MD          ED Provider  Electronically Signed by           Jennifer Alonso MD  05/28/23 0996

## 2023-05-28 NOTE — DISCHARGE INSTRUCTIONS
You can take the splint of in the next couple days, it is in place to protect your finger  Oozing from the cut is ok, return if you have persistent bleeding

## 2023-05-30 ENCOUNTER — PATIENT OUTREACH (OUTPATIENT)
Dept: INTERNAL MEDICINE CLINIC | Facility: CLINIC | Age: 58
End: 2023-05-30

## 2023-05-30 NOTE — PROGRESS NOTES
Spoke with Ed  He is doing good except for stitches in his finger he got this weekend  Weight down to 250's and fasting blood sugars run less than 120  A1C is 6 7  Happy with how he feels and how he is doing He will call PCP office to set appointment to get stiches out

## 2023-05-31 ENCOUNTER — RA CDI HCC (OUTPATIENT)
Dept: OTHER | Facility: HOSPITAL | Age: 58
End: 2023-05-31

## 2023-05-31 NOTE — PROGRESS NOTES
E11 22, E11 42  [facility] 75  coding opportunities          Chart Reviewed number of suggestions sent to Provider: 2     Patients Insurance     Medicare Insurance: [employer]

## 2023-06-04 DIAGNOSIS — E11.69 TYPE 2 DIABETES MELLITUS WITH OTHER SPECIFIED COMPLICATION, WITHOUT LONG-TERM CURRENT USE OF INSULIN (HCC): ICD-10-CM

## 2023-06-05 RX ORDER — BLOOD SUGAR DIAGNOSTIC
STRIP MISCELLANEOUS
Qty: 100 STRIP | Refills: 0 | Status: SHIPPED | OUTPATIENT
Start: 2023-06-05

## 2023-06-05 RX ORDER — LANCETS
EACH MISCELLANEOUS
Qty: 102 EACH | Refills: 0 | Status: SHIPPED | OUTPATIENT
Start: 2023-06-05

## 2023-06-15 ENCOUNTER — OFFICE VISIT (OUTPATIENT)
Dept: INTERNAL MEDICINE CLINIC | Facility: CLINIC | Age: 58
End: 2023-06-15

## 2023-06-15 VITALS
SYSTOLIC BLOOD PRESSURE: 118 MMHG | BODY MASS INDEX: 35.03 KG/M2 | OXYGEN SATURATION: 91 % | WEIGHT: 258.6 LBS | HEART RATE: 75 BPM | TEMPERATURE: 96.8 F | HEIGHT: 72 IN | DIASTOLIC BLOOD PRESSURE: 76 MMHG

## 2023-06-15 DIAGNOSIS — M79.605 LEFT LEG PAIN: ICD-10-CM

## 2023-06-15 DIAGNOSIS — M54.40 LOW BACK PAIN WITH SCIATICA, SCIATICA LATERALITY UNSPECIFIED, UNSPECIFIED BACK PAIN LATERALITY, UNSPECIFIED CHRONICITY: ICD-10-CM

## 2023-06-15 DIAGNOSIS — E11.69 TYPE 2 DIABETES MELLITUS WITH OTHER SPECIFIED COMPLICATION, WITHOUT LONG-TERM CURRENT USE OF INSULIN (HCC): ICD-10-CM

## 2023-06-15 DIAGNOSIS — I10 ESSENTIAL HYPERTENSION: ICD-10-CM

## 2023-06-15 DIAGNOSIS — S61.214D LACERATION OF RIGHT RING FINGER WITHOUT FOREIGN BODY WITHOUT DAMAGE TO NAIL, SUBSEQUENT ENCOUNTER: Primary | ICD-10-CM

## 2023-06-15 DIAGNOSIS — N18.32 STAGE 3B CHRONIC KIDNEY DISEASE (HCC): ICD-10-CM

## 2023-06-15 DIAGNOSIS — K43.9 VENTRAL HERNIA WITHOUT OBSTRUCTION OR GANGRENE: ICD-10-CM

## 2023-06-15 PROBLEM — R22.1 NECK MASS: Status: RESOLVED | Noted: 2022-11-21 | Resolved: 2023-06-15

## 2023-06-15 PROBLEM — S61.214A LACERATION OF RIGHT RING FINGER WITHOUT FOREIGN BODY WITHOUT DAMAGE TO NAIL: Status: ACTIVE | Noted: 2023-06-15

## 2023-06-15 PROBLEM — M86.9 OSTEOMYELITIS OF LEFT FOOT (HCC): Status: RESOLVED | Noted: 2022-08-16 | Resolved: 2023-06-15

## 2023-06-15 RX ORDER — CHROMIUM PICOLINATE 200 MCG
TABLET ORAL
COMMUNITY

## 2023-06-15 RX ORDER — PYRIDOXINE HCL (VITAMIN B6) 50 MG
50 TABLET ORAL DAILY
COMMUNITY

## 2023-06-15 RX ORDER — TRAMADOL HYDROCHLORIDE 50 MG/1
TABLET ORAL
Qty: 60 TABLET | Refills: 0 | Status: SHIPPED | OUTPATIENT
Start: 2023-06-15

## 2023-06-15 RX ORDER — CALCIUM CARBONATE/VITAMIN D3 600 MG-10
100 TABLET ORAL DAILY
COMMUNITY

## 2023-06-15 NOTE — ASSESSMENT & PLAN NOTE
Hypertension remains adequately controlled continue lisinopril hydrochlorothiazide 20-12 5 mg twice a day updated comprehensive metabolic profile has been requested    Also continue carvedilol 25 mg twice a day

## 2023-06-15 NOTE — ASSESSMENT & PLAN NOTE
Finger laceration examined today 6 sutures removed from the wound appears to be healing nicely no indication of any infection Fresh dressing applied and advised for wound management    Return to the office if any discharge increasing pain or discoloration

## 2023-06-15 NOTE — ASSESSMENT & PLAN NOTE
Reviewed ventral hernia with the patient    He like to see if it can be repaired referral to general surgery provided to the patient

## 2023-06-15 NOTE — ASSESSMENT & PLAN NOTE
Lab Results   Component Value Date    CREATININE 1 58 (H) 01/30/2023    CREATININE 1 50 (H) 09/16/2022    CREATININE 1 72 (H) 07/26/2022    EGFR 47 01/30/2023    EGFR 50 09/16/2022    EGFR 42 07/26/2022   I have requested an updated comprehensive metabolic profile for this gentleman we will review the results upon completion    Continue blood sugar and blood pressure control measures and avoid dehydration

## 2023-06-15 NOTE — ASSESSMENT & PLAN NOTE
Recommend a comprehensive metabolic profile as well as a hemoglobin A1c to evaluate patient's current status of diabetic control    Recommend continuation of Ozempic and Jardiance at current doses  Lab Results   Component Value Date    HGBA1C 6 7 (H) 01/30/2023

## 2023-06-15 NOTE — ASSESSMENT & PLAN NOTE
Chronic low back pain with sciatica irritation patient requesting refill on his tramadol for control    No abnormal use noted no side effects appreciated the [place] controlled substance website was reviewed new prescription sent to his pharmacy

## 2023-06-15 NOTE — PROGRESS NOTES
Name: Clari Diaz      : 1965      MRN: [id number]  Encounter Provider: Dionicio Jones MD  Encounter Date: 6/15/2023   Encounter department: [facility]     1  Ventral hernia without obstruction or gangrene  Assessment & Plan:  Reviewed ventral hernia with the patient  He like to see if it can be repaired referral to general surgery provided to the patient    Orders:  -     Ambulatory Referral to General Surgery; Future    2  Type 2 diabetes mellitus with other specified complication, without long-term current use of insulin (HCC)  Assessment & Plan:  Recommend a comprehensive metabolic profile as well as a hemoglobin A1c to evaluate patient's current status of diabetic control  Recommend continuation of Ozempic and Jardiance at current doses  Lab Results   Component Value Date    HGBA1C 6 7 (H) 2023       Orders:  -     Hemoglobin A1C; Future  -     Comprehensive metabolic panel; Future  -     Albumin / creatinine urine ratio; Future    3  Left leg pain  -     traMADol (Ultram) 50 mg tablet; 2 tablets in evening    4  Essential hypertension  Assessment & Plan:  Hypertension remains adequately controlled continue lisinopril hydrochlorothiazide 20-12 5 mg twice a day updated comprehensive metabolic profile has been requested  Also continue carvedilol 25 mg twice a day      5  Low back pain with sciatica, sciatica laterality unspecified, unspecified back pain laterality, unspecified chronicity  Assessment & Plan:  Chronic low back pain with sciatica irritation patient requesting refill on his tramadol for control  No abnormal use noted no side effects appreciated the [place] controlled substance website was reviewed new prescription sent to his pharmacy      6   Stage 3b chronic kidney disease ([facility] [address] )  Assessment & Plan:  Lab Results   Component Value Date    CREATININE 1 58 (H) 2023    CREATININE 1 50 (H) 2022    CREATININE 1 72 (H) 07/26/2022    EGFR 47 01/30/2023    EGFR 50 09/16/2022    EGFR 42 07/26/2022   I have requested an updated comprehensive metabolic profile for this gentleman we will review the results upon completion  Continue blood sugar and blood pressure control measures and avoid dehydration      7  Laceration of right ring finger without foreign body without damage to nail, subsequent encounter  Assessment & Plan:  Finger laceration examined today 6 sutures removed from the wound appears to be healing nicely no indication of any infection Fresh dressing applied and advised for wound management  Return to the office if any discharge increasing pain or discoloration           Subjective     This pleasant 58-year-old gentleman presents today for routine checkup and also removal of sutures from the right ring finger  He sustained a open glass laceration of the finger distally  He was seen in the emergency room and underwent suture placement  He subsequently later that day experienced 3 trauma to the finger trying to break up a dog fight  He presents today with some tenderness in the finger but no evidence of any active infection nor any inflammation of the wound  There is are intact with no evidence of any wound separation  Patient is concerned about a abdominal wall herniation  He previously underwent surgery to excess but it appears that he has again a separation of the right rectus abdominis muscle just below the umbilicus      Review of Systems   Gastrointestinal:        Ventral abdominal hernia   Skin:        Healing laceration of the right ring finger       Past Medical History:   Diagnosis Date   • H/O eye surgery    • High blood sugar    • Hypertension      Past Surgical History:   Procedure Laterality Date   • HERNIA REPAIR     • KIDNEY SURGERY     • MOUTH SURGERY     • CT AMPUTATION METATARSAL W/TOE SINGLE Left 6/1/2022    Procedure: RAY RESECTION FOOT;  Surgeon: Leyla Shelley DPM;  [facility]; Service: Podiatry   • WOUND DEBRIDEMENT Left 4/28/2022    Procedure: Left foot washout;  Surgeon: Eri Johnson DPM;  [facility];  Service: Podiatry   • WOUND DEBRIDEMENT Left 6/6/2022    Procedure: DEBRIDEMENT WOUND [facility] OUT); Surgeon: Eri Johnson DPM;  [facility];  Service: Podiatry     No family history on file  Social History     Socioeconomic History   • Marital status: Registered Domestic Partner     Spouse name: None   • Number of children: None   • Years of education: None   • Highest education level: None   Occupational History   • None   Tobacco Use   • Smoking status: Never   • Smokeless tobacco: Never   Vaping Use   • Vaping Use: Never used   Substance and Sexual Activity   • Alcohol use: Yes     Comment: ocassional   • Drug use: Never   • Sexual activity: None   Other Topics Concern   • None   Social History Narrative   • None     Social Determinants of Health     Financial Resource Strain: Not on file   Food Insecurity: No Food Insecurity (4/29/2022)    Hunger Vital Sign    • Worried About Running Out of Food in the Last Year: Never true    • Ran Out of Food in the Last Year: Never true   Transportation Needs: No Transportation Needs (4/29/2022)    PRAPARE - Transportation    • Lack of Transportation (Medical): No    • Lack of Transportation (Non-Medical):  No   Physical Activity: Not on file   Stress: Not on file   Social Connections: Not on file   Intimate Partner Violence: Not on file   Housing Stability: Low Risk  (4/29/2022)    Housing Stability Vital Sign    • Unable to Pay for Housing in the Last Year: No    • Number of Places Lived in the Last Year: 1    • Unstable Housing in the Last Year: No     Current Outpatient Medications on File Prior to Visit   Medication Sig   • Accu-Chek FastClix Lancets MISC Use to test blood sugar once a day   • Alpha-Lipoic Acid 600 MG CAPS Take 600 mg by mouth 2 (two) times a day     • Apple Cider Vinegar 300 MG TABS Take 300 mg by mouth daily     • Ascorbic Acid (vitamin C) 1000 MG tablet Take 1,000 mg by mouth 2 (two) times a day 2 tablet twice a day   • BENFOTIAMINE PO Take 900 mg by mouth 2 (two) times a day   • beta carotene 30 MG capsule Take 30 mg by mouth 2 (two) times a day     • Blood Glucose Monitoring Suppl (Accu-Chek Guide Me) w/Device KIT Use to check blood sugar once a day   • carvedilol (COREG) 25 mg tablet Take 1 tablet (25 mg total) by mouth 2 (two) times a day with meals   • Chromium Picolinate 200 MCG TABS Take by mouth   • Empagliflozin (Jardiance) 25 MG TABS Take 1 tablet (25 mg total) by mouth daily   • glucose blood (Accu-Chek Guide) test strip Use to check blood sugar once a day   • IRON, FERROUS SULFATE, PO Take by mouth   • lisinopril-hydrochlorothiazide (PRINZIDE,ZESTORETIC) 20-12 5 MG per tablet Take 1 tablet by mouth 2 (two) times a day   • Lutein-Bilberry (LUTEIN PLUS BILBERRY PO) Take by mouth   • pregabalin (LYRICA) 50 mg capsule    • Pyridoxine HCl (B-6 PO) Take by mouth   • semaglutide, 0 25 or 0 5 mg/dose, (Ozempic, 0 25 or 0 5 MG/DOSE,) 2 mg/1 5 mL injection pen Inject 0 19 mL (0 25 mg total) under the skin once a week   • Thiamine Mononitrate (B1) 100 MG TABS Take 100 mg by mouth daily   • Turmeric (QC TUMERIC COMPLEX PO) Take 1,000 mg by mouth once Tumeric /curcimin   • vitamin B-12 (CYANOCOBALAMIN) 100 MCG TABS Take 50 mcg by mouth daily   • VITAMIN D PO Take by mouth 2 (two) times a day   • Zinc 50 MG CAPS Take by mouth 2 (two) times a day    • [DISCONTINUED] traMADol (Ultram) 50 mg tablet 2 tablets in evening   • Garlic 9363 MG CAPS Take by mouth 2 (two) times a day    • lidocaine (Lidoderm) 5 % Apply 1 patch topically daily Remove & Discard patch within 12 hours or as directed by MD   • prednisoLONE acetate (PRED FORTE) 1 % ophthalmic suspension    • vitamin E, tocopherol, 400 units capsule Take 400 Units by mouth 2 (two) times a day     Allergies   Allergen Reactions   • Cephalexin Hives     Skin peeling  Tolerates penicillins (tolerated unasyn and zosyn)   • Metformin GI Intolerance     Immunization History   Administered Date(s) Administered   • Tdap 02/17/2022       Objective     /76   Pulse 75   Temp (!) 96 8 °F (36 °C)   Ht 6' (1 829 m)   Wt 117 kg (258 lb 9 6 oz)   SpO2 91%   BMI 35 07 kg/m²     Physical Exam  Constitutional:       General: He is not in acute distress  Appearance: He is well-developed  He is not ill-appearing  HENT:      Right Ear: Hearing and external ear normal       Left Ear: Hearing and external ear normal       Nose: Nose normal    Eyes:      Conjunctiva/sclera: Conjunctivae normal       Pupils: Pupils are equal, round, and reactive to light  Neck:      Thyroid: No thyromegaly  Cardiovascular:      Rate and Rhythm: Normal rate and regular rhythm  Heart sounds: Normal heart sounds, S1 normal and S2 normal  No murmur heard  Pulmonary:      Effort: Pulmonary effort is normal       Breath sounds: Normal breath sounds  No wheezing, rhonchi or rales  Abdominal:      General: Bowel sounds are normal       Palpations: Abdomen is soft  Comments: Ventral abdominal hernia separation of the rectus abdominis muscle to the right side and below the umbilicus  Musculoskeletal:         General: Normal range of motion  Lymphadenopathy:      Cervical: No cervical adenopathy  Skin:     General: Skin is warm and dry  Comments: 6 sutures removed from laceration of the right ring finger healing nicely Fresh dressing applied recommend avoiding exposure to dirty water for 3 to 4 days   Neurological:      General: No focal deficit present  Mental Status: He is alert and oriented to person, place, and time  Deep Tendon Reflexes: Reflexes are normal and symmetric  Reflexes normal    Psychiatric:         Behavior: Behavior normal          Thought Content:  Thought content normal          Judgment: Judgment normal        Irvin Noel MD

## 2023-06-19 ENCOUNTER — HOSPITAL ENCOUNTER (EMERGENCY)
Facility: HOSPITAL | Age: 58
Discharge: HOME | End: 2023-06-19
Attending: EMERGENCY MEDICINE
Payer: MEDICARE

## 2023-06-19 VITALS
SYSTOLIC BLOOD PRESSURE: 124 MMHG | DIASTOLIC BLOOD PRESSURE: 83 MMHG | BODY MASS INDEX: 35.88 KG/M2 | OXYGEN SATURATION: 96 % | RESPIRATION RATE: 17 BRPM | TEMPERATURE: 98.4 F | WEIGHT: 264.55 LBS | HEART RATE: 86 BPM
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
## 2023-06-19 DIAGNOSIS — L03.012 CELLULITIS OF LEFT INDEX FINGER: ICD-10-CM

## 2023-06-19 DIAGNOSIS — S61.258A DOG BITE OF INDEX FINGER, INITIAL ENCOUNTER: ICD-10-CM

## 2023-06-19 PROCEDURE — 99283 EMERGENCY DEPT VISIT LOW MDM: CPT

## 2023-06-19 RX ORDER — AMOXICILLIN AND CLAVULANATE POTASSIUM 875; 125 MG/1; MG/1
1 TABLET, FILM COATED ORAL EVERY 12 HOURS SCHEDULED
Qty: 20 TABLET | Refills: 0 | Status: SHIPPED | OUTPATIENT
Start: 2023-06-19 | End: 2023-06-29

## 2023-06-19 RX ORDER — AMOXICILLIN AND CLAVULANATE POTASSIUM 875; 125 MG/1; MG/1
1 TABLET, FILM COATED ORAL ONCE
Status: COMPLETED | OUTPATIENT
Start: 2023-06-19 | End: 2023-06-19

## 2023-06-19 RX ORDER — GINSENG 100 MG
1 CAPSULE ORAL ONCE
Status: COMPLETED | OUTPATIENT
Start: 2023-06-19 | End: 2023-06-19

## 2023-06-19 RX ADMIN — BACITRACIN ZINC 1 SMALL APPLICATION: 500 OINTMENT TOPICAL at 20:18

## 2023-06-19 RX ADMIN — AMOXICILLIN AND CLAVULANATE POTASSIUM 1 TABLET: 875; 125 TABLET, FILM COATED ORAL at 20:18

## 2023-06-20 ENCOUNTER — PREP FOR PROCEDURE (OUTPATIENT)
Dept: SURGERY | Facility: CLINIC | Age: 58
End: 2023-06-20

## 2023-06-20 ENCOUNTER — CONSULT (OUTPATIENT)
Dept: SURGERY | Facility: CLINIC | Age: 58
End: 2023-06-20
Payer: MEDICARE

## 2023-06-20 ENCOUNTER — PATIENT OUTREACH (OUTPATIENT)
Dept: INTERNAL MEDICINE CLINIC | Facility: CLINIC | Age: 58
End: 2023-06-20

## 2023-06-20 VITALS
HEIGHT: 72 IN | SYSTOLIC BLOOD PRESSURE: 104 MMHG | WEIGHT: 255 LBS | BODY MASS INDEX: 34.54 KG/M2 | HEART RATE: 83 BPM | DIASTOLIC BLOOD PRESSURE: 68 MMHG

## 2023-06-20 DIAGNOSIS — K43.2 INCISIONAL HERNIA WITHOUT OBSTRUCTION OR GANGRENE: Primary | ICD-10-CM

## 2023-06-20 DIAGNOSIS — K43.2 INCISIONAL HERNIA: Primary | ICD-10-CM

## 2023-06-20 DIAGNOSIS — E11.9 TYPE 2 DIABETES MELLITUS WITHOUT COMPLICATION, UNSPECIFIED WHETHER LONG TERM INSULIN USE (HCC): ICD-10-CM

## 2023-06-20 DIAGNOSIS — K43.9 VENTRAL HERNIA WITHOUT OBSTRUCTION OR GANGRENE: ICD-10-CM

## 2023-06-20 PROCEDURE — 99213 OFFICE O/P EST LOW 20 MIN: CPT | Performed by: SURGERY

## 2023-06-20 PROCEDURE — 99203 OFFICE O/P NEW LOW 30 MIN: CPT | Performed by: SURGERY

## 2023-06-20 NOTE — ED PROVIDER NOTES
History  Chief Complaint   Patient presents with   • Dog Bite     Pt got bit by own dog (both pointer fingers)  Dog UTD on shots  UTD on tetanus     This is a 57-year-old male presents to the ED for evaluation status post dog bite to his bilateral index fingers from yesterday  Patient reports that he is a dog that is approximately 20 pounds and is blind  Patient states that while his dog was eating he accidentally bit both of his fingers  His dog is up-to-date on vaccines  He reports that he did immediately clean the puncture wounds and soaked his fingers in Epsom salt though he states pain has been worsening in his fingers and he has had swelling of his finger  Denies any loss of sensation in either finger  States pain is localized to his fingers only, nonradiating into hand or arm  Patient denies any other acute concerns or complaints at this time including recent fevers, chills, headaches, lightheadedness, chest pain, SOB, abdominal pain, NVD, dysuria  He is up-to-date on tetanus  Prior to Admission Medications   Prescriptions Last Dose Informant Patient Reported? Taking?    Accu-Chek FastClix Lancets MISC   No No   Sig: Use to test blood sugar once a day   Alpha-Lipoic Acid 600 MG CAPS  Self Yes No   Sig: Take 600 mg by mouth 2 (two) times a day     Apple Cider Vinegar 300 MG TABS  Self Yes No   Sig: Take 300 mg by mouth daily     Ascorbic Acid (vitamin C) 1000 MG tablet  Self Yes No   Sig: Take 1,000 mg by mouth 2 (two) times a day 2 tablet twice a day   BENFOTIAMINE PO  Self Yes No   Sig: Take 900 mg by mouth 2 (two) times a day   Blood Glucose Monitoring Suppl (Accu-Chek Guide Me) w/Device KIT   No No   Sig: Use to check blood sugar once a day   Chromium Picolinate 200 MCG TABS   Yes No   Sig: Take by mouth   Empagliflozin (Jardiance) 25 MG TABS  Self No No   Sig: Take 1 tablet (25 mg total) by mouth daily   Garlic 1944 MG CAPS  Self Yes No   Sig: Take by mouth 2 (two) times a day    IRON, FERROUS SULFATE, PO   Yes No   Sig: Take by mouth   Lutein-Bilberry (LUTEIN PLUS BILBERRY PO)   Yes No   Sig: Take by mouth   Pyridoxine HCl (B-6 PO)   Yes No   Sig: Take by mouth   Thiamine Mononitrate (B1) 100 MG TABS   Yes No   Sig: Take 100 mg by mouth daily   Turmeric (QC TUMERIC COMPLEX PO)  Self Yes No   Sig: Take 1,000 mg by mouth once Tumeric /curcimin   VITAMIN D PO  Self Yes No   Sig: Take by mouth 2 (two) times a day   Zinc 50 MG CAPS  Self Yes No   Sig: Take by mouth 2 (two) times a day    beta carotene 30 MG capsule  Self Yes No   Sig: Take 30 mg by mouth 2 (two) times a day     carvedilol (COREG) 25 mg tablet  Self No No   Sig: Take 1 tablet (25 mg total) by mouth 2 (two) times a day with meals   glucose blood (Accu-Chek Guide) test strip   No No   Sig: Use to check blood sugar once a day   lidocaine (Lidoderm) 5 %  Self No No   Sig: Apply 1 patch topically daily Remove & Discard patch within 12 hours or as directed by MD   lisinopril-hydrochlorothiazide (PRINZIDE,ZESTORETIC) 20-12 5 MG per tablet  Self No No   Sig: Take 1 tablet by mouth 2 (two) times a day   prednisoLONE acetate (PRED FORTE) 1 % ophthalmic suspension  Self Yes No   pregabalin (LYRICA) 50 mg capsule   Yes No   semaglutide, 0 25 or 0 5 mg/dose, (Ozempic, 0 25 or 0 5 MG/DOSE,) 2 mg/1 5 mL injection pen   No No   Sig: Inject 0 19 mL (0 25 mg total) under the skin once a week   traMADol (Ultram) 50 mg tablet   No No   Si tablets in evening   vitamin B-12 (CYANOCOBALAMIN) 100 MCG TABS   Yes No   Sig: Take 50 mcg by mouth daily   vitamin E, tocopherol, 400 units capsule  Self Yes No   Sig: Take 400 Units by mouth 2 (two) times a day      Facility-Administered Medications: None       Past Medical History:   Diagnosis Date   • H/O eye surgery    • High blood sugar    • Hypertension        Past Surgical History:   Procedure Laterality Date   • HERNIA REPAIR     • KIDNEY SURGERY     • MOUTH SURGERY     • UT AMPUTATION METATARSAL W/TOE SINGLE Left 6/1/2022    Procedure: RAY RESECTION FOOT;  Surgeon: Poncho Hare DPM;  Location: [facility];  Service: Podiatry   • WOUND DEBRIDEMENT Left 4/28/2022    Procedure: Left foot washout;  Surgeon: Poncho Hare DPM;  Location: [facility];  Service: Podiatry   • WOUND DEBRIDEMENT Left 6/6/2022    Procedure: DEBRIDEMENT WOUND [facility] OUT); Surgeon: Poncho Hare DPM;  Location: [facility];  Service: Podiatry       History reviewed  No pertinent family history  I have reviewed and agree with the history as documented  E-Cigarette/Vaping   • E-Cigarette Use Never User      E-Cigarette/Vaping Substances   • Nicotine No    • THC No    • CBD No    • Flavoring No    • Other No    • Unknown No      Social History     Tobacco Use   • Smoking status: Never   • Smokeless tobacco: Never   Vaping Use   • Vaping Use: Never used   Substance Use Topics   • Alcohol use: Yes     Comment: ocassional   • Drug use: Never       Review of Systems   Constitutional: Negative for chills and fever  Eyes: Negative for photophobia and visual disturbance  Respiratory: Negative for cough and shortness of breath  Cardiovascular: Negative for chest pain and palpitations  Gastrointestinal: Negative for abdominal pain, diarrhea, nausea and vomiting  Genitourinary: Negative for difficulty urinating and dysuria  Skin: Positive for rash and wound  Neurological: Negative for dizziness, syncope, weakness, light-headedness and headaches  Physical Exam  Physical Exam  Vitals and nursing note reviewed  Constitutional:       General: He is not in acute distress  Appearance: He is well-developed  HENT:      Head: Normocephalic and atraumatic  Eyes:      Conjunctiva/sclera: Conjunctivae normal    Cardiovascular:      Rate and Rhythm: Normal rate and regular rhythm  Heart sounds: No murmur heard  Pulmonary:      Effort: Pulmonary effort is normal  No respiratory distress        Breath sounds: Normal breath sounds  Abdominal:      Palpations: Abdomen is soft  Tenderness: There is no abdominal tenderness  Musculoskeletal:         General: No swelling  Hands:       Cervical back: Neck supple  Comments: Examination of patient's left index finger does show a small puncture wound between patient's PIP and DIP  Patient is able to flex and extend finger though reports increased pain with flexion extension  Finger appears mildly swollen with diffuse erythema that does not extend past MCP  Proximal and distal sensation intact in finger with normal cap refill, left index finger is diffusely tender on palpation  Examination patient's right index finger show a wound on the extensor side of his finger below patient's nail  Patient does also have dried blood at the base of his nail bed  Normal range of motion in finger without pain on movement, finger does not appear to have any obvious signs of erythema, warmth  1 small puncture wound between PIP and DIP, nonbleeding, no purulent discharge  Skin:     General: Skin is warm and dry  Capillary Refill: Capillary refill takes less than 2 seconds  Neurological:      General: No focal deficit present  Mental Status: He is alert and oriented to person, place, and time     Psychiatric:         Mood and Affect: Mood normal                          Vital Signs  ED Triage Vitals   Temperature Pulse Respirations Blood Pressure SpO2   06/19/23 1933 06/19/23 1936 06/19/23 1936 06/19/23 1936 06/19/23 1936   98 4 °F (36 9 °C) 86 17 124/83 96 %      Temp Source Heart Rate Source Patient Position - Orthostatic VS BP Location FiO2 (%)   06/19/23 1933 06/19/23 1936 06/19/23 1936 06/19/23 1936 --   Oral Monitor Sitting Right arm       Pain Score       --                  Vitals:    06/19/23 1936   BP: 124/83   Pulse: 86   Patient Position - Orthostatic VS: Sitting         Visual Acuity      ED Medications  Medications   amoxicillin-clavulanate (AUGMENTIN) 875-125 mg per tablet 1 tablet (1 tablet Oral Given 6/19/23 2018)   bacitracin topical ointment 1 small application (1 small application Topical Given 6/19/23 2018)       Diagnostic Studies  Results Reviewed     None                 No orders to display              Procedures  Procedures         ED Course                               SBIRT 20yo+    Flowsheet Row Most Recent Value   Initial Alcohol Screen: US AUDIT-C     1  How often do you have a drink containing alcohol? 0 Filed at: 06/19/2023 1955   2  How many drinks containing alcohol do you have on a typical day you are drinking? 0 Filed at: 06/19/2023 1955   3a  Male UNDER 65: How often do you have five or more drinks on one occasion? 0 Filed at: 06/19/2023 1955   Audit-C Score 0 Filed at: 06/19/2023 1955   GARCÍA: How many times in the past year have you    Used an illegal drug or used a prescription medication for non-medical reasons? Never Filed at: 06/19/2023 1955                    Medical Decision Making  78-year-old male presents to the ED for evaluation of dog bite wounds to patient's bilateral index fingers  Reports that the left index finger is worse than the right  Examination does show cellulitis of patient's left index finger  Bacitracin applied bilateral index fingers from dressing applied  Patient started on Augmentin for dog bite cellulitis, first dose in ED, remaining prescription sent to pharmacy  Patient does currently follow with hand surgery  Advised to follow-up with his PCP and hand surgery for further evaluation and management as needed  Strict ED return precautions discussed with patient for any signs of any worsening symptoms  Patient verbalizes understanding and agreement with plan  Risk  OTC drugs  Prescription drug management            Disposition  Final diagnoses:   Dog bite, initial encounter   Dog bite of index finger, initial encounter - bilateral index fingers   Cellulitis of left index finger     Time reflects when diagnosis was documented in both MDM as applicable and the Disposition within this note     Time User Action Codes Description Comment    6/19/2023  8:15 PM Daisy Pryor Add Susan Lema  0XXA] Dog bite, initial encounter     6/19/2023  8:16 PM Daisy Pryor Add Sheridan Nicole  0XXA] Dog bite of index finger, initial encounter     6/19/2023  8:16 PM Daisy Pryor Modify [L20 229B,  K80  0XXA] Dog bite of index finger, initial encounter bilateral index fingers    6/19/2023  8:16 PM Daisy Pryor Add [Z49 [id number]] Cellulitis of left index finger       ED Disposition     ED Disposition   Discharge    Condition   Stable    Date/Time   Mon Jun 19, 2023  8:16 PM    Comment   Liliana Mcfarlandjanice discharge to home/self care                 Follow-up Information     Follow up With Specialties Details Why Contact Info    Ricardo Cisse MD Internal Medicine Schedule an appointment as soon as possible for a visit  For re-check [address]  [address], [facility] [address] [facility]  [phone]            Discharge Medication List as of 6/19/2023  8:17 PM      START taking these medications    Details   amoxicillin-clavulanate (AUGMENTIN) 875-125 mg per tablet Take 1 tablet by mouth every 12 (twelve) hours for 10 days, Starting Mon 6/19/2023, Until Thu 6/29/2023, Normal         CONTINUE these medications which have NOT CHANGED    Details   Accu-Chek FastClix Lancets MISC Use to test blood sugar once a day, Normal      Alpha-Lipoic Acid 600 MG CAPS Take 600 mg by mouth 2 (two) times a day  , Historical Med      Apple Cider Vinegar 300 MG TABS Take 300 mg by mouth daily  , Historical Med      Ascorbic Acid (vitamin C) 1000 MG tablet Take 1,000 mg by mouth 2 (two) times a day 2 tablet twice a day, Historical Med      BENFOTIAMINE PO Take 900 mg by mouth 2 (two) times a day, Historical Med      beta carotene 30 MG capsule Take 30 mg by mouth 2 (two) times a day  , Historical Med      Blood Glucose Monitoring Suppl (Accu-Chek Guide Me) w/Device KIT Use to check blood sugar once a day, Normal      carvedilol (COREG) 25 mg tablet Take 1 tablet (25 mg total) by mouth 2 (two) times a day with meals, Starting Mon 8/15/2022, Normal      Chromium Picolinate 200 MCG TABS Take by mouth, Historical Med      Empagliflozin (Jardiance) 25 MG TABS Take 1 tablet (25 mg total) by mouth daily, Starting Fri 9/9/2022, Normal      Garlic 6289 MG CAPS Take by mouth 2 (two) times a day , Historical Med      glucose blood (Accu-Chek Guide) test strip Use to check blood sugar once a day, Normal      IRON, FERROUS SULFATE, PO Take by mouth, Historical Med      lidocaine (Lidoderm) 5 % Apply 1 patch topically daily Remove & Discard patch within 12 hours or as directed by MD, Starting Fri 7/29/2022, Normal      lisinopril-hydrochlorothiazide (PRINZIDE,ZESTORETIC) 20-12 5 MG per tablet Take 1 tablet by mouth 2 (two) times a day, Starting Mon 8/15/2022, Normal      Lutein-Bilberry (LUTEIN PLUS BILBERRY PO) Take by mouth, Historical Med      prednisoLONE acetate (PRED FORTE) 1 % ophthalmic suspension Starting Thu 8/18/2022, Historical Med      pregabalin (LYRICA) 50 mg capsule Starting Fri 3/17/2023, Historical Med      Pyridoxine HCl (B-6 PO) Take by mouth, Historical Med      semaglutide, 0 25 or 0 5 mg/dose, (Ozempic, 0 25 or 0 5 MG/DOSE,) 2 mg/1 5 mL injection pen Inject 0 19 mL (0 25 mg total) under the skin once a week, Starting Wed 4/26/2023, Normal      Thiamine Mononitrate (B1) 100 MG TABS Take 100 mg by mouth daily, Historical Med      traMADol (Ultram) 50 mg tablet 2 tablets in evening, Normal      Turmeric (QC TUMERIC COMPLEX PO) Take 1,000 mg by mouth once Tumeric /curcimin, Historical Med      vitamin B-12 (CYANOCOBALAMIN) 100 MCG TABS Take 50 mcg by mouth daily, Historical Med      VITAMIN D PO Take by mouth 2 (two) times a day, Historical Med      vitamin E, tocopherol, 400 units capsule Take 400 Units by mouth 2 (two) times a day, Historical Med      Zinc 50 MG CAPS Take by mouth 2 (two) times a day , Historical Med             No discharge procedures on file      PDMP Review       Value Time User    PDMP Reviewed  Yes 6/15/2023  8:20 AM Avon Gosselin, MD          ED Provider  Electronically Signed by           Jose Pate PA-C  06/20/23 0028

## 2023-06-20 NOTE — PROGRESS NOTES
ADT alert for emergency room visit for dog bite   Utilization appropriate will not reach out at this time

## 2023-06-20 NOTE — PROGRESS NOTES
Assessment/Plan: Patient presents with an incisional hernia to the right of the previous umbilical hernia repair  His previous repair was 20 years ago  He has discomfort at times  He has been present for a few years  It seems to be enlarging in size  Worse with activity, improved with rest     Examination reveals an incisional hernia above into the right at the umbilical incision  This is reducible  Operative repair is recommended  Risks and benefits explained  Diagnoses and all orders for this visit:    Ventral hernia without obstruction or gangrene  -     Ambulatory Referral to General Surgery        Subjective:      Patient ID: Kulwant Adler is a 59 y o  male  Patient presents for umbilical hernia consult  States he has had a bulge and discomfort at his umbilicus since having umbilical hernia repair 20 years ago  Limits his activities  The following portions of the patient's history were reviewed and updated as appropriate:     He  has a past medical history of H/O eye surgery, High blood sugar, and Hypertension  He  has a past surgical history that includes Hernia repair; Kidney surgery; Mouth surgery; Wound debridement (Left, 4/28/2022); pr amputation metatarsal w/toe single (Left, 6/1/2022); and Wound debridement (Left, 6/6/2022)  His family history is not on file  He  reports that he has never smoked  He has never used smokeless tobacco  He reports current alcohol use  He reports that he does not use drugs    Current Outpatient Medications   Medication Sig Dispense Refill   • Accu-Chek FastClix Lancets MISC Use to test blood sugar once a day 102 each 0   • Alpha-Lipoic Acid 600 MG CAPS Take 600 mg by mouth 2 (two) times a day       • amoxicillin-clavulanate (AUGMENTIN) 875-125 mg per tablet Take 1 tablet by mouth every 12 (twelve) hours for 10 days 20 tablet 0   • Apple Cider Vinegar 300 MG TABS Take 300 mg by mouth daily       • Ascorbic Acid (vitamin C) 1000 MG tablet Take 1,000 mg by mouth 2 (two) times a day 2 tablet twice a day     • BENFOTIAMINE PO Take 900 mg by mouth 2 (two) times a day     • beta carotene 30 MG capsule Take 30 mg by mouth 2 (two) times a day       • Blood Glucose Monitoring Suppl (Accu-Chek Guide Me) w/Device KIT Use to check blood sugar once a day 1 kit 0   • carvedilol (COREG) 25 mg tablet Take 1 tablet (25 mg total) by mouth 2 (two) times a day with meals 180 tablet 3   • Chromium Picolinate 200 MCG TABS Take by mouth     • Empagliflozin (Jardiance) 25 MG TABS Take 1 tablet (25 mg total) by mouth daily 90 tablet 3   • Garlic 5217 MG CAPS Take by mouth 2 (two) times a day      • glucose blood (Accu-Chek Guide) test strip Use to check blood sugar once a day 100 strip 0   • IRON, FERROUS SULFATE, PO Take by mouth     • lidocaine (Lidoderm) 5 % Apply 1 patch topically daily Remove & Discard patch within 12 hours or as directed by MD 10 patch 0   • lisinopril-hydrochlorothiazide (PRINZIDE,ZESTORETIC) 20-12 5 MG per tablet Take 1 tablet by mouth 2 (two) times a day 180 tablet 3   • Lutein-Bilberry (LUTEIN PLUS BILBERRY PO) Take by mouth     • prednisoLONE acetate (PRED FORTE) 1 % ophthalmic suspension      • pregabalin (LYRICA) 50 mg capsule      • Pyridoxine HCl (B-6 PO) Take by mouth     • semaglutide, 0 25 or 0 5 mg/dose, (Ozempic, 0 25 or 0 5 MG/DOSE,) 2 mg/1 5 mL injection pen Inject 0 19 mL (0 25 mg total) under the skin once a week 15 mL 3   • Thiamine Mononitrate (B1) 100 MG TABS Take 100 mg by mouth daily     • traMADol (Ultram) 50 mg tablet 2 tablets in evening 60 tablet 0   • Turmeric (QC TUMERIC COMPLEX PO) Take 1,000 mg by mouth once Tumeric /curcimin     • vitamin B-12 (CYANOCOBALAMIN) 100 MCG TABS Take 50 mcg by mouth daily     • VITAMIN D PO Take by mouth 2 (two) times a day     • vitamin E, tocopherol, 400 units capsule Take 400 Units by mouth 2 (two) times a day     • Zinc 50 MG CAPS Take by mouth 2 (two) times a day        No current facility-administered medications for this visit  He is allergic to cephalexin and metformin       Review of Systems   Constitutional: Negative  Negative for activity change  HENT: Negative  Eyes: Negative  Respiratory: Negative  Cardiovascular: Negative  Gastrointestinal: Positive for abdominal pain  Endocrine: Negative  Genitourinary: Negative  Musculoskeletal: Negative  Skin: Negative  Allergic/Immunologic: Negative  Neurological: Negative  Psychiatric/Behavioral: Negative for agitation, behavioral problems and confusion  The patient is not nervous/anxious  Objective:      /68   Pulse 83   Ht 6' (1 829 m)   Wt 116 kg (255 lb)   BMI 34 58 kg/m²          Physical Exam  Constitutional:       Appearance: He is well-developed  He is not diaphoretic  HENT:      Head: Normocephalic and atraumatic  Eyes:      General: No scleral icterus  Right eye: No discharge  Left eye: No discharge  Extraocular Movements: Extraocular movements intact  Conjunctiva/sclera: Conjunctivae normal    Neck:      Thyroid: No thyromegaly  Trachea: No tracheal deviation  Cardiovascular:      Rate and Rhythm: Normal rate and regular rhythm  Heart sounds: Normal heart sounds  No murmur heard  No friction rub  Pulmonary:      Effort: Pulmonary effort is normal  No respiratory distress  Breath sounds: Normal breath sounds  No stridor  No wheezing  Chest:      Chest wall: No tenderness  Abdominal:      General: There is no distension  Palpations: There is no mass  Tenderness: There is no abdominal tenderness  There is no guarding or rebound  Hernia: A hernia (,Incisional hernia located above into the right at the previous umbilical hernia repair ) is present  Musculoskeletal:         General: No tenderness  Right lower leg: No edema  Left lower leg: No edema  Lymphadenopathy:      Cervical: No cervical adenopathy     Skin: General: Skin is warm and dry  Findings: No erythema or rash  Neurological:      Mental Status: He is alert and oriented to person, place, and time  Cranial Nerves: No cranial nerve deficit        Coordination: Coordination normal    Psychiatric:         Behavior: Behavior normal          Judgment: Judgment normal

## 2023-06-20 NOTE — DISCHARGE INSTRUCTIONS
Take your augmentin as directed  Follow-up with hand surgery as well as your family doctor for further evaluation and management  Return to the ED if you develop any worsening symptoms as discussed prior to discharge

## 2023-07-01 ENCOUNTER — APPOINTMENT (OUTPATIENT)
Dept: LAB | Facility: HOSPITAL | Age: 58
End: 2023-07-01
Payer: MEDICARE

## 2023-07-01 DIAGNOSIS — E11.69 TYPE 2 DIABETES MELLITUS WITH OTHER SPECIFIED COMPLICATION, WITHOUT LONG-TERM CURRENT USE OF INSULIN (HCC): ICD-10-CM

## 2023-07-01 LAB
ALBUMIN SERPL BCP-MCNC: 4.1 G/DL (ref 3.5–5)
ALP SERPL-CCNC: 54 U/L (ref 34–104)
ALT SERPL W P-5'-P-CCNC: 17 U/L (ref 7–52)
ANION GAP SERPL CALCULATED.3IONS-SCNC: 8 MMOL/L
AST SERPL W P-5'-P-CCNC: 19 U/L (ref 13–39)
BILIRUB SERPL-MCNC: 0.35 MG/DL (ref 0.2–1)
BUN SERPL-MCNC: 34 MG/DL (ref 5–25)
CALCIUM SERPL-MCNC: 8.7 MG/DL (ref 8.4–10.2)
CHLORIDE SERPL-SCNC: 103 MMOL/L (ref 96–108)
CO2 SERPL-SCNC: 27 MMOL/L (ref 21–32)
CREAT SERPL-MCNC: 1.8 MG/DL (ref 0.6–1.3)
EST. AVERAGE GLUCOSE BLD GHB EST-MCNC: 117 MG/DL
GFR SERPL CREATININE-BSD FRML MDRD: 40 ML/MIN/1.73SQ M
GLUCOSE P FAST SERPL-MCNC: 119 MG/DL (ref 65–99)
HBA1C MFR BLD: 5.7 %
POTASSIUM SERPL-SCNC: 5.8 MMOL/L (ref 3.5–5.3)
PROT SERPL-MCNC: 7.2 G/DL (ref 6.4–8.4)
SODIUM SERPL-SCNC: 138 MMOL/L (ref 135–147)

## 2023-07-01 PROCEDURE — 83036 HEMOGLOBIN GLYCOSYLATED A1C: CPT

## 2023-07-01 PROCEDURE — 36415 COLL VENOUS BLD VENIPUNCTURE: CPT

## 2023-07-01 PROCEDURE — 80053 COMPREHEN METABOLIC PANEL: CPT

## 2023-07-02 ENCOUNTER — APPOINTMENT (OUTPATIENT)
Dept: LAB | Facility: HOSPITAL | Age: 58
End: 2023-07-02
Payer: MEDICARE

## 2023-07-02 DIAGNOSIS — Z13.29 SCREENING FOR HYPOTHYROIDISM: ICD-10-CM

## 2023-07-02 DIAGNOSIS — E11.69 TYPE 2 DIABETES MELLITUS WITH OTHER SPECIFIED COMPLICATION, WITHOUT LONG-TERM CURRENT USE OF INSULIN (HCC): ICD-10-CM

## 2023-07-02 LAB
ANION GAP SERPL CALCULATED.3IONS-SCNC: 5 MMOL/L
BUN SERPL-MCNC: 36 MG/DL (ref 5–25)
CALCIUM SERPL-MCNC: 8.7 MG/DL (ref 8.4–10.2)
CHLORIDE SERPL-SCNC: 101 MMOL/L (ref 96–108)
CO2 SERPL-SCNC: 27 MMOL/L (ref 21–32)
CREAT SERPL-MCNC: 1.77 MG/DL (ref 0.6–1.3)
CREAT UR-MCNC: 57.5 MG/DL
GFR SERPL CREATININE-BSD FRML MDRD: 40 ML/MIN/1.73SQ M
GLUCOSE P FAST SERPL-MCNC: 111 MG/DL (ref 65–99)
MICROALBUMIN UR-MCNC: 5.9 MG/L (ref 0–20)
MICROALBUMIN/CREAT 24H UR: 10 MG/G CREATININE (ref 0–30)
POTASSIUM SERPL-SCNC: 5 MMOL/L (ref 3.5–5.3)
SODIUM SERPL-SCNC: 133 MMOL/L (ref 135–147)
TSH SERPL DL<=0.05 MIU/L-ACNC: 0.46 UIU/ML (ref 0.45–4.5)

## 2023-07-02 PROCEDURE — 84443 ASSAY THYROID STIM HORMONE: CPT

## 2023-07-02 PROCEDURE — 80048 BASIC METABOLIC PNL TOTAL CA: CPT

## 2023-07-02 PROCEDURE — 82043 UR ALBUMIN QUANTITATIVE: CPT

## 2023-07-02 PROCEDURE — 82570 ASSAY OF URINE CREATININE: CPT

## 2023-07-02 PROCEDURE — 36415 COLL VENOUS BLD VENIPUNCTURE: CPT

## 2023-07-03 ENCOUNTER — OFFICE VISIT (OUTPATIENT)
Dept: ENDOCRINOLOGY | Facility: CLINIC | Age: 58
End: 2023-07-03
Payer: MEDICARE

## 2023-07-03 VITALS
DIASTOLIC BLOOD PRESSURE: 72 MMHG | HEART RATE: 79 BPM | BODY MASS INDEX: 36.6 KG/M2 | SYSTOLIC BLOOD PRESSURE: 128 MMHG | HEIGHT: 72 IN | WEIGHT: 270.2 LBS | OXYGEN SATURATION: 96 %

## 2023-07-03 DIAGNOSIS — E11.69 TYPE 2 DIABETES MELLITUS WITH OTHER SPECIFIED COMPLICATION, WITHOUT LONG-TERM CURRENT USE OF INSULIN (HCC): Primary | ICD-10-CM

## 2023-07-03 DIAGNOSIS — Z86.31 H/O DIABETIC FOOT ULCER: ICD-10-CM

## 2023-07-03 DIAGNOSIS — N18.32 STAGE 3B CHRONIC KIDNEY DISEASE (HCC): ICD-10-CM

## 2023-07-03 DIAGNOSIS — E66.01 CLASS 2 SEVERE OBESITY WITH SERIOUS COMORBIDITY AND BODY MASS INDEX (BMI) OF 35.0 TO 35.9 IN ADULT, UNSPECIFIED OBESITY TYPE (HCC): ICD-10-CM

## 2023-07-03 DIAGNOSIS — G62.9 NEUROPATHY: ICD-10-CM

## 2023-07-03 DIAGNOSIS — Z89.422 ACQUIRED ABSENCE OF OTHER LEFT TOE(S) (HCC): ICD-10-CM

## 2023-07-03 DIAGNOSIS — I10 ESSENTIAL HYPERTENSION: ICD-10-CM

## 2023-07-03 DIAGNOSIS — E78.5 DYSLIPIDEMIA: ICD-10-CM

## 2023-07-03 PROCEDURE — 99214 OFFICE O/P EST MOD 30 MIN: CPT | Performed by: INTERNAL MEDICINE

## 2023-07-03 NOTE — PROGRESS NOTES
Miguel Ramos 59 y.o. male MRN: [id number]    Encounter: [id number]      Assessment/Plan     1. Type 2 diabetes mellitus not on insulin therapy   2. Neuropathy with h/o foot ulcer s/p left 5th ray amputation (5/2022)  3. Retinopathy   4. CKD, creat 1.77  5. Obesity   Well controlled with Last A1c 5.7%  Does not want to increase dose of ozempic     Recommend the following at this time  Continue current regimen   -Follow-up with ophthalmology  -Consistent carb diet, regular exercise  Follow-up in 4 months with repeat labs    6. Hyperlipidemia  Not on statin therapy    7. Hypertension  Blood pressure at goal   - continue current medications including ACE-I/ ARB      CC: Diabetes    History of Present Illness     HPI:  Miguel Ramos is a 59 y.o. male presents for a follow-up visit regarding diabetes management. Also has     Last seen in 1/2023  Last Eye exam: 6/2023 - DR Ethel Starr per history; has retinopathy and advised some eyedrops, if not improvement will need anti-VEGF injections    Current regimen:   jardiance 25 mg daily   Ozempic 0.25 mg weekly     Abdominal pain related to hernia  - elective surgery on 7/14   Taking lyrica, pain better so is able to sleep  Wound on the hands - dog bite and broken bottle; healing   Has h/o foot ulcer     Statin:  --   ACE-I/ARB:  lisinopril     Home glucose monitoring: on recall   Before breakfast: 113 this morming, most are in the 110s  After lunch : 120s  Usually eats twice a day ;protein shakes for lunch     Symptoms of hypoglycemia :  No lows     All other systems were reviewed and are negative.     Review of Systems      Historical Information   Past Medical History:   Diagnosis Date   • H/O eye surgery    • High blood sugar    • Hypertension      Past Surgical History:   Procedure Laterality Date   • HERNIA REPAIR     • KIDNEY SURGERY     • MOUTH SURGERY     • HI AMPUTATION METATARSAL W/TOE SINGLE Left 6/1/2022    Procedure: RAY RESECTION FOOT;  Surgeon: Young Tirado DPM; Location: [facility];  Service: Podiatry   • WOUND DEBRIDEMENT Left 4/28/2022    Procedure: Left foot washout;  Surgeon: Abdirashid Felton DPM;  Location: [facility];  Service: Podiatry   • WOUND DEBRIDEMENT Left 6/6/2022    Procedure: DEBRIDEMENT WOUND [facility] OUT); Surgeon: Abdirashid Felton DPM;  Location: [facility];  Service: Podiatry     Social History   Social History     Substance and Sexual Activity   Alcohol Use Not Currently    Comment: ocassional     Social History     Substance and Sexual Activity   Drug Use Never     Social History     Tobacco Use   Smoking Status Never   Smokeless Tobacco Never     Family History: History reviewed. No pertinent family history.     Meds/Allergies   Current Outpatient Medications   Medication Sig Dispense Refill   • Accu-Chek FastClix Lancets MISC Use to test blood sugar once a day 102 each 0   • Alpha-Lipoic Acid 600 MG CAPS Take 600 mg by mouth 2 (two) times a day       • Apple Cider Vinegar 300 MG TABS Take 300 mg by mouth daily       • Ascorbic Acid (vitamin C) 1000 MG tablet Take 1,000 mg by mouth 2 (two) times a day 2 tablet twice a day     • BENFOTIAMINE PO Take 900 mg by mouth 2 (two) times a day     • beta carotene 30 MG capsule Take 30 mg by mouth 2 (two) times a day       • Blood Glucose Monitoring Suppl (Accu-Chek Guide Me) w/Device KIT Use to check blood sugar once a day 1 kit 0   • carvedilol (COREG) 25 mg tablet Take 1 tablet (25 mg total) by mouth 2 (two) times a day with meals 180 tablet 3   • Chromium Picolinate 200 MCG TABS Take by mouth     • Empagliflozin (Jardiance) 25 MG TABS Take 1 tablet (25 mg total) by mouth daily 90 tablet 3   • Garlic 2254 MG CAPS Take by mouth 2 (two) times a day      • glucose blood (Accu-Chek Guide) test strip Use to check blood sugar once a day 100 strip 0   • IRON, FERROUS SULFATE, PO Take by mouth     • lidocaine (Lidoderm) 5 % Apply 1 patch topically daily Remove & Discard patch within 12 hours or as directed by MD 10 patch 0 • lisinopril-hydrochlorothiazide (PRINZIDE,ZESTORETIC) 20-12.5 MG per tablet Take 1 tablet by mouth 2 (two) times a day 180 tablet 3   • Lutein-Bilberry (LUTEIN PLUS BILBERRY PO) Take by mouth     • prednisoLONE acetate (PRED FORTE) 1 % ophthalmic suspension      • pregabalin (LYRICA) 50 mg capsule      • Pyridoxine HCl (B-6 PO) Take by mouth     • semaglutide, 0.25 or 0.5 mg/dose, (Ozempic, 0.25 or 0.5 MG/DOSE,) 2 mg/1.5 mL injection pen Inject 0.19 mL (0.25 mg total) under the skin once a week 15 mL 3   • Thiamine Mononitrate (B1) 100 MG TABS Take 100 mg by mouth daily     • traMADol (Ultram) 50 mg tablet 2 tablets in evening 60 tablet 0   • Turmeric (QC TUMERIC COMPLEX PO) Take 1,000 mg by mouth once Tumeric /curcimin     • vitamin B-12 (CYANOCOBALAMIN) 100 MCG TABS Take 50 mcg by mouth daily     • VITAMIN D PO Take by mouth 2 (two) times a day     • vitamin E, tocopherol, 400 units capsule Take 400 Units by mouth 2 (two) times a day     • Zinc 50 MG CAPS Take by mouth 2 (two) times a day        No current facility-administered medications for this visit. Allergies   Allergen Reactions   • Cephalexin Hives     Skin peeling  Tolerates penicillins (tolerated unasyn and zosyn)   • Metformin GI Intolerance       Objective   Vitals: Blood pressure 128/72, pulse 79, height 6' (1.829 m), weight 123 kg (270 lb 3.2 oz), SpO2 96 %. Physical Exam  Constitutional:       General: He is not in acute distress. Appearance: He is well-developed. He is not diaphoretic. HENT:      Head: Normocephalic and atraumatic. Eyes:      Conjunctiva/sclera: Conjunctivae normal.      Pupils: Pupils are equal, round, and reactive to light. Cardiovascular:      Rate and Rhythm: Normal rate and regular rhythm. Heart sounds: Normal heart sounds. No murmur heard. Pulmonary:      Effort: Pulmonary effort is normal. No respiratory distress. Breath sounds: Normal breath sounds. No wheezing.    Abdominal:      General: There is no distension. Palpations: Abdomen is soft. Tenderness: There is no abdominal tenderness. There is no guarding. Musculoskeletal:      Cervical back: Normal range of motion and neck supple. Skin:     General: Skin is warm and dry. Findings: No erythema or rash. Neurological:      Mental Status: He is alert and oriented to person, place, and time. Psychiatric:         Behavior: Behavior normal.         Thought Content: Thought content normal.         The history was obtained from the review of the chart, patient.     Lab Results:   Lab Results   Component Value Date/Time    Hemoglobin A1C 5.7 (H) 07/01/2023 11:38 AM    Hemoglobin A1C 6.7 (H) 01/30/2023 01:03 PM    Hemoglobin A1C 6.8 (H) 07/26/2022 10:37 AM    WBC 4.04 (L) 01/30/2023 01:03 PM    WBC 5.25 09/16/2022 12:11 PM    WBC 5.17 07/26/2022 10:37 AM    Hemoglobin 13.2 01/30/2023 01:03 PM    Hemoglobin 14.3 09/16/2022 12:11 PM    Hemoglobin 12.8 07/26/2022 10:37 AM    Hematocrit 39.7 01/30/2023 01:03 PM    Hematocrit 42.4 09/16/2022 12:11 PM    Hematocrit 39.5 07/26/2022 10:37 AM    MCV 92 01/30/2023 01:03 PM    MCV 91 09/16/2022 12:11 PM    MCV 91 07/26/2022 10:37 AM    Platelets 791 30/99/6382 01:03 PM    Platelets 013 00/01/3253 12:11 PM    Platelets 260 77/15/5907 10:37 AM    BUN 36 (H) 07/02/2023 11:18 AM    BUN 34 (H) 07/01/2023 11:38 AM    BUN 41 (H) 01/30/2023 01:03 PM    Potassium 5.0 07/02/2023 11:18 AM    Potassium 5.8 (H) 07/01/2023 11:38 AM    Potassium 4.6 01/30/2023 01:03 PM    Chloride 101 07/02/2023 11:18 AM    Chloride 103 07/01/2023 11:38 AM    Chloride 100 01/30/2023 01:03 PM    CO2 27 07/02/2023 11:18 AM    CO2 27 07/01/2023 11:38 AM    CO2 24 01/30/2023 01:03 PM    Creatinine 1.77 (H) 07/02/2023 11:18 AM    Creatinine 1.80 (H) 07/01/2023 11:38 AM    Creatinine 1.58 (H) 01/30/2023 01:03 PM    AST 19 07/01/2023 11:38 AM    AST 17 01/30/2023 01:03 PM    AST 18 09/16/2022 12:11 PM    ALT 17 07/01/2023 11:38 AM ALT 17 01/30/2023 01:03 PM    ALT 27 09/16/2022 12:11 PM    Total Protein 7.2 07/01/2023 11:38 AM    Total Protein 7.6 01/30/2023 01:03 PM    Total Protein 8.6 (H) 09/16/2022 12:11 PM    Albumin 4.1 07/01/2023 11:38 AM    Albumin 4.4 01/30/2023 01:03 PM    Albumin 4.1 09/16/2022 12:11 PM    HDL, Direct 33 (L) 01/30/2023 01:03 PM    HDL, Direct 35 (L) 09/16/2022 12:11 PM    HDL, Direct 35 (L) 07/26/2022 10:37 AM    Triglycerides 153 (H) 01/30/2023 01:03 PM    Triglycerides 114 09/16/2022 12:11 PM    Triglycerides 130 07/26/2022 10:37 AM         Imaging Studies: I have personally reviewed pertinent reports. Portions of the record may have been created with voice recognition software. Occasional wrong word or "sound a like" substitutions may have occurred due to the inherent limitations of voice recognition software. Read the chart carefully and recognize, using context, where substitutions have occurred.

## 2023-07-03 NOTE — DISCHARGE INSTR - AVS FIRST PAGE
Please call the office when you leave to schedule an appointment for 2 weeks. Please call [phone]. [address] [place] [address], [address], [place], [address]. Off of [address] between [facility] and [facility]. Activity:    May lift 10 lb as many times as desired the 1st week,       20 lb in 2 weeks,       30 lb in 3 weeks. Walking is encouraged  Normal daily activities including climbing steps are okay  Do not engage in strenuous activity ( sit-ups or crutches) or contact sports for 4-6 weeks post-operatively    Return to Work:   Okay to return to work when you feel well if you desire. Diet:   You may return to your normal healthy diet. Wound Care: Your wound is closed with dissolvable stitches and glue. It is okay to shower. Wash incision gently with soap and water and pat dry. Do not soak incisions in bath water or swim for two weeks. Do not apply any creams or ointments. Pain Medication:   Please take as directed if needed. May use Advil or Motrin in addition. Recall, the pain medicine and anesthesia is associated with constipation. No driving while taking narcotic pain medications. Other: It is normal to developed a “healing ridge” / firm incision after surgery. This is your body making scar tissue. It is a good sign  Constipation is very common after general anesthesia. Please use milk of magnesia as needed in order to help prevent constipation. It is normal to get bruising after surgery. If you have questions after discharge please call the office.     If you have increased pain, fever >101.5, increased drainage, redness or a bad smell at your surgery site, please call us immediately or come directly to the Emergency Room

## 2023-07-05 ENCOUNTER — ANESTHESIA EVENT (OUTPATIENT)
Dept: PERIOP | Facility: AMBULARY SURGERY CENTER | Age: 58
End: 2023-07-05
Payer: MEDICARE

## 2023-07-11 NOTE — PRE-PROCEDURE INSTRUCTIONS
Pre-Surgery Instructions:   Medication Instructions   • Alpha-Lipoic Acid 600 MG CAPS Stop taking 7 days prior to surgery. • Apple Cider Vinegar 300 MG TABS Stop taking 7 days prior to surgery. • Ascorbic Acid (vitamin C) 1000 MG tablet Stop taking 7 days prior to surgery. • BENFOTIAMINE PO Stop taking 7 days prior to surgery. • beta carotene 30 MG capsule Stop taking 7 days prior to surgery. • carvedilol (COREG) 25 mg tablet Take day of surgery. • Chromium Picolinate 200 MCG TABS Stop taking 7 days prior to surgery. • Empagliflozin (Jardiance) 25 MG TABS Stop taking 4 days prior to surgery. • Garlic 8577 MG CAPS Stop taking 7 days prior to surgery. • IRON, FERROUS SULFATE, PO Stop taking 7 days prior to surgery. • lisinopril-hydrochlorothiazide (PRINZIDE,ZESTORETIC) 20-12.5 MG per tablet Hold day of surgery. • Lutein-Bilberry (LUTEIN PLUS BILBERRY PO) Stop taking 7 days prior to surgery. • prednisoLONE acetate (PRED FORTE) 1 % ophthalmic suspension Take day of surgery. • pregabalin (LYRICA) 50 mg capsule Take night before surgery   • Pyridoxine HCl (B-6 PO) Stop taking 7 days prior to surgery. • semaglutide, 0.25 or 0.5 mg/dose, (Ozempic, 0.25 or 0.5 MG/DOSE,) 2 mg/1.5 mL injection pen takes on Sundays   • Thiamine Mononitrate (B1) 100 MG TABS Stop taking 7 days prior to surgery. • traMADol (Ultram) 50 mg tablet Uses PRN- OK to take day of surgery   • Turmeric (QC TUMERIC COMPLEX PO) Stop taking 7 days prior to surgery. • vitamin B-12 (CYANOCOBALAMIN) 100 MCG TABS Stop taking 7 days prior to surgery. • VITAMIN D PO Stop taking 7 days prior to surgery. • vitamin E, tocopherol, 400 units capsule Stop taking 7 days prior to surgery. • Zinc 50 MG CAPS Stop taking 7 days prior to surgery. Medication instructions for day surgery reviewed. Please use only a sip of water to take your instructed medications.  Avoid all over the counter vitamins, supplements and NSAIDS for one week prior to surgery per anesthesia guidelines. Tylenol is ok to take as needed. You will receive a call one business day prior to surgery with an arrival time and hospital directions. If your surgery is scheduled on a Monday, the hospital will be calling you on the Friday prior to your surgery. If you have not heard from anyone by 8pm, please call the hospital supervisor through the hospital  at [phone]. Stephanie Keyes [phone]). Do not eat or drink anything after midnight the night before your surgery, including candy, mints, lifesavers, or chewing gum. Do not drink alcohol 24hrs before your surgery. Try not to smoke at least 24hrs before your surgery. Follow the pre surgery showering instructions as listed in the [facility] Surgical Experience Booklet” or otherwise provided by your surgeon's office. Do not shave the surgical area 24 hours before surgery. Do not apply any lotions, creams, including makeup, cologne, deodorant, or perfumes after showering on the day of your surgery. No contact lenses, eye make-up, or artificial eyelashes. Remove nail polish, including gel polish, and any artificial, gel, or acrylic nails if possible. Remove all jewelry including rings and body piercing jewelry. Wear causal clothing that is easy to take on and off. Consider your type of surgery. Keep any valuables, jewelry, piercings at home. Please bring any specially ordered equipment (sling, braces) if indicated. Arrange for a responsible person to drive you to and from the hospital on the day of your surgery. Visitor Guidelines discussed. Call the surgeon's office with any new illnesses, exposures, or additional questions prior to surgery. Please reference your [facility] Surgical Experience Booklet” for additional information to prepare for your upcoming surgery.

## 2023-07-13 NOTE — H&P (VIEW-ONLY)
Assessment/Plan: Patient presents with an incisional hernia to the right of the previous umbilical hernia repair. His previous repair was 20 years ago. He has discomfort at times. He has been present for a few years. It seems to be enlarging in size. Worse with activity, improved with rest.    Examination reveals an incisional hernia above into the right at the umbilical incision. This is reducible. Operative repair is recommended. Risks and benefits explained. Diagnoses and all orders for this visit:    Incisional hernia without obstruction or gangrene    Ventral hernia without obstruction or gangrene  -     Ambulatory Referral to General Surgery        Subjective:      Patient ID: Arvin Razo is a 59 y.o. male. Patient presents for umbilical hernia consult. States he has had a bulge and discomfort at his umbilicus since having umbilical hernia repair 20 years ago. Limits his activities. The following portions of the patient's history were reviewed and updated as appropriate:     He  has a past medical history of Chronic pain disorder, Diabetes mellitus ([address]), H/O eye surgery, High blood sugar, Hypertension, and Liver disease. He  has a past surgical history that includes Hernia repair; Kidney surgery; Mouth surgery; Wound debridement (Left, 4/28/2022); pr amputation metatarsal w/toe single (Left, 6/1/2022); and Wound debridement (Left, 6/6/2022). His family history is not on file. He  reports that he has never smoked. He has never used smokeless tobacco. He reports current alcohol use. He reports that he does not use drugs.   Current Outpatient Medications   Medication Sig Dispense Refill   • Accu-Chek FastClix Lancets MISC Use to test blood sugar once a day 102 each 0   • Alpha-Lipoic Acid 600 MG CAPS Take 600 mg by mouth 2 (two) times a day       • Apple Cider Vinegar 300 MG TABS Take 300 mg by mouth daily       • Ascorbic Acid (vitamin C) 1000 MG tablet Take 1,000 mg by mouth 2 (two) times a day 2 tablet twice a day     • BENFOTIAMINE PO Take 900 mg by mouth 2 (two) times a day     • beta carotene 30 MG capsule Take 30 mg by mouth 2 (two) times a day       • Blood Glucose Monitoring Suppl (Accu-Chek Guide Me) w/Device KIT Use to check blood sugar once a day 1 kit 0   • carvedilol (COREG) 25 mg tablet Take 1 tablet (25 mg total) by mouth 2 (two) times a day with meals 180 tablet 3   • Chromium Picolinate 200 MCG TABS Take by mouth     • Empagliflozin (Jardiance) 25 MG TABS Take 1 tablet (25 mg total) by mouth daily 90 tablet 3   • Garlic 8332 MG CAPS Take by mouth 2 (two) times a day      • glucose blood (Accu-Chek Guide) test strip Use to check blood sugar once a day 100 strip 0   • IRON, FERROUS SULFATE, PO Take by mouth     • lidocaine (Lidoderm) 5 % Apply 1 patch topically daily Remove & Discard patch within 12 hours or as directed by MD 10 patch 0   • lisinopril-hydrochlorothiazide (PRINZIDE,ZESTORETIC) 20-12.5 MG per tablet Take 1 tablet by mouth 2 (two) times a day 180 tablet 3   • Lutein-Bilberry (LUTEIN PLUS BILBERRY PO) Take by mouth     • prednisoLONE acetate (PRED FORTE) 1 % ophthalmic suspension      • pregabalin (LYRICA) 50 mg capsule      • Pyridoxine HCl (B-6 PO) Take by mouth     • semaglutide, 0.25 or 0.5 mg/dose, (Ozempic, 0.25 or 0.5 MG/DOSE,) 2 mg/1.5 mL injection pen Inject 0.19 mL (0.25 mg total) under the skin once a week 15 mL 3   • Thiamine Mononitrate (B1) 100 MG TABS Take 100 mg by mouth daily     • traMADol (Ultram) 50 mg tablet 2 tablets in evening 60 tablet 0   • Turmeric (QC TUMERIC COMPLEX PO) Take 1,000 mg by mouth once Tumeric /curcimin     • vitamin B-12 (CYANOCOBALAMIN) 100 MCG TABS Take 50 mcg by mouth daily     • VITAMIN D PO Take by mouth 2 (two) times a day     • vitamin E, tocopherol, 400 units capsule Take 400 Units by mouth 2 (two) times a day     • Zinc 50 MG CAPS Take by mouth 2 (two) times a day        No current facility-administered medications for this visit. He is allergic to cephalexin and metformin. .    Review of Systems   Constitutional: Negative. Negative for activity change. HENT: Negative. Eyes: Negative. Respiratory: Negative. Cardiovascular: Negative. Gastrointestinal: Positive for abdominal pain. Endocrine: Negative. Genitourinary: Negative. Musculoskeletal: Negative. Skin: Negative. Allergic/Immunologic: Negative. Neurological: Negative. Psychiatric/Behavioral: Negative for agitation, behavioral problems and confusion. The patient is not nervous/anxious. Objective:      /68   Pulse 83   Ht 6' (1.829 m)   Wt 116 kg (255 lb)   BMI 34.58 kg/m²          Physical Exam  Constitutional:       Appearance: He is well-developed. He is not diaphoretic. HENT:      Head: Normocephalic and atraumatic. Eyes:      General: No scleral icterus. Right eye: No discharge. Left eye: No discharge. Extraocular Movements: Extraocular movements intact. Conjunctiva/sclera: Conjunctivae normal.   Neck:      Thyroid: No thyromegaly. Trachea: No tracheal deviation. Cardiovascular:      Rate and Rhythm: Normal rate and regular rhythm. Heart sounds: Normal heart sounds. No murmur heard. No friction rub. Pulmonary:      Effort: Pulmonary effort is normal. No respiratory distress. Breath sounds: Normal breath sounds. No stridor. No wheezing. Chest:      Chest wall: No tenderness. Abdominal:      General: There is no distension. Palpations: There is no mass. Tenderness: There is no abdominal tenderness. There is no guarding or rebound. Hernia: A hernia (,Incisional hernia located above into the right at the previous umbilical hernia repair.) is present. Musculoskeletal:         General: No tenderness. Right lower leg: No edema. Left lower leg: No edema. Lymphadenopathy:      Cervical: No cervical adenopathy.    Skin:     General: Skin is warm and dry. Findings: No erythema or rash. Neurological:      Mental Status: He is alert and oriented to person, place, and time. Cranial Nerves: No cranial nerve deficit.       Coordination: Coordination normal.   Psychiatric:         Behavior: Behavior normal.         Judgment: Judgment normal.

## 2023-07-14 ENCOUNTER — HOSPITAL ENCOUNTER (OUTPATIENT)
Facility: AMBULARY SURGERY CENTER | Age: 58
Setting detail: OUTPATIENT SURGERY
Discharge: HOME | End: 2023-07-14
Attending: SURGERY | Admitting: SURGERY
Payer: MEDICARE

## 2023-07-14 ENCOUNTER — APPOINTMENT (OUTPATIENT)
Dept: LAB | Facility: CLINIC | Age: 58
End: 2023-07-14
Payer: MEDICARE

## 2023-07-14 ENCOUNTER — OFFICE VISIT (OUTPATIENT)
Dept: LAB | Facility: CLINIC | Age: 58
End: 2023-07-14
Payer: MEDICARE

## 2023-07-14 ENCOUNTER — ANESTHESIA (OUTPATIENT)
Dept: PERIOP | Facility: AMBULARY SURGERY CENTER | Age: 58
End: 2023-07-14
Payer: MEDICARE

## 2023-07-14 VITALS
HEIGHT: 72 IN | SYSTOLIC BLOOD PRESSURE: 137 MMHG | TEMPERATURE: 97.6 F | WEIGHT: 260 LBS | HEART RATE: 79 BPM | DIASTOLIC BLOOD PRESSURE: 78 MMHG | OXYGEN SATURATION: 95 % | BODY MASS INDEX: 35.21 KG/M2 | RESPIRATION RATE: 18 BRPM

## 2023-07-14 DIAGNOSIS — K43.2 INCISIONAL HERNIA: ICD-10-CM

## 2023-07-14 DIAGNOSIS — K43.2 INCISIONAL HERNIA WITHOUT OBSTRUCTION OR GANGRENE: Primary | ICD-10-CM

## 2023-07-14 LAB
ALBUMIN SERPL BCP-MCNC: 4.1 G/DL (ref 3.5–5)
ALP SERPL-CCNC: 51 U/L (ref 34–104)
ALT SERPL W P-5'-P-CCNC: 17 U/L (ref 7–52)
ANION GAP SERPL CALCULATED.3IONS-SCNC: 4 MMOL/L
AST SERPL W P-5'-P-CCNC: 26 U/L (ref 13–39)
BILIRUB SERPL-MCNC: 0.39 MG/DL (ref 0.2–1)
BUN SERPL-MCNC: 37 MG/DL (ref 5–25)
CALCIUM SERPL-MCNC: 9.2 MG/DL (ref 8.4–10.2)
CHLORIDE SERPL-SCNC: 102 MMOL/L (ref 96–108)
CO2 SERPL-SCNC: 27 MMOL/L (ref 21–32)
CREAT SERPL-MCNC: 1.67 MG/DL (ref 0.6–1.3)
ERYTHROCYTE [DISTWIDTH] IN BLOOD BY AUTOMATED COUNT: 13.4 % (ref 11.6–15.1)
EST. AVERAGE GLUCOSE BLD GHB EST-MCNC: 111 MG/DL
GFR SERPL CREATININE-BSD FRML MDRD: 43 ML/MIN/1.73SQ M
GLUCOSE P FAST SERPL-MCNC: 106 MG/DL (ref 65–99)
GLUCOSE SERPL-MCNC: 105 MG/DL (ref 65–140)
HBA1C MFR BLD: 5.5 %
HCT VFR BLD AUTO: 37.7 % (ref 36.5–49.3)
HGB BLD-MCNC: 12.4 G/DL (ref 12–17)
MCH RBC QN AUTO: 31.2 PG (ref 26.8–34.3)
MCHC RBC AUTO-ENTMCNC: 32.9 G/DL (ref 31.4–37.4)
MCV RBC AUTO: 95 FL (ref 82–98)
PLATELET # BLD AUTO: 247 THOUSANDS/UL (ref 149–390)
PMV BLD AUTO: 10.5 FL (ref 8.9–12.7)
POTASSIUM SERPL-SCNC: 5.1 MMOL/L (ref 3.5–5.3)
PROT SERPL-MCNC: 7.3 G/DL (ref 6.4–8.4)
RBC # BLD AUTO: 3.97 MILLION/UL (ref 3.88–5.62)
SODIUM SERPL-SCNC: 133 MMOL/L (ref 135–147)
WBC # BLD AUTO: 6.43 THOUSAND/UL (ref 4.31–10.16)

## 2023-07-14 PROCEDURE — 83036 HEMOGLOBIN GLYCOSYLATED A1C: CPT

## 2023-07-14 PROCEDURE — C1781 MESH (IMPLANTABLE): HCPCS | Performed by: SURGERY

## 2023-07-14 PROCEDURE — 36415 COLL VENOUS BLD VENIPUNCTURE: CPT

## 2023-07-14 PROCEDURE — 93005 ELECTROCARDIOGRAM TRACING: CPT

## 2023-07-14 PROCEDURE — 85027 COMPLETE CBC AUTOMATED: CPT

## 2023-07-14 PROCEDURE — 82948 REAGENT STRIP/BLOOD GLUCOSE: CPT

## 2023-07-14 PROCEDURE — 49592 RPR AA HRN 1ST < 3 NCR/STRN: CPT | Performed by: SURGERY

## 2023-07-14 PROCEDURE — 49592 RPR AA HRN 1ST < 3 NCR/STRN: CPT | Performed by: PHYSICIAN ASSISTANT

## 2023-07-14 PROCEDURE — 80053 COMPREHEN METABOLIC PANEL: CPT

## 2023-07-14 DEVICE — VENTRALEX ST HERNIA PATCH
Type: IMPLANTABLE DEVICE | Site: ABDOMEN | Status: FUNCTIONAL
Brand: VENTRALEX ST HERNIA PATCH

## 2023-07-14 RX ORDER — ONDANSETRON 2 MG/ML
4 INJECTION INTRAMUSCULAR; INTRAVENOUS EVERY 4 HOURS PRN
Status: DISCONTINUED | OUTPATIENT
Start: 2023-07-14 | End: 2023-07-14 | Stop reason: HOSPADM

## 2023-07-14 RX ORDER — SUCCINYLCHOLINE/SOD CL,ISO/PF 100 MG/5ML
SYRINGE (ML) INTRAVENOUS AS NEEDED
Status: DISCONTINUED | OUTPATIENT
Start: 2023-07-14 | End: 2023-07-14

## 2023-07-14 RX ORDER — CLINDAMYCIN PHOSPHATE 900 MG/50ML
900 INJECTION INTRAVENOUS ONCE
Status: COMPLETED | OUTPATIENT
Start: 2023-07-14 | End: 2023-07-14

## 2023-07-14 RX ORDER — MIDAZOLAM HYDROCHLORIDE 2 MG/2ML
INJECTION, SOLUTION INTRAMUSCULAR; INTRAVENOUS AS NEEDED
Status: DISCONTINUED | OUTPATIENT
Start: 2023-07-14 | End: 2023-07-14

## 2023-07-14 RX ORDER — OXYCODONE HYDROCHLORIDE AND ACETAMINOPHEN 5; 325 MG/1; MG/1
1 TABLET ORAL EVERY 4 HOURS PRN
Status: DISCONTINUED | OUTPATIENT
Start: 2023-07-14 | End: 2023-07-14 | Stop reason: HOSPADM

## 2023-07-14 RX ORDER — FENTANYL CITRATE/PF 50 MCG/ML
25 SYRINGE (ML) INJECTION
Status: DISCONTINUED | OUTPATIENT
Start: 2023-07-14 | End: 2023-07-14 | Stop reason: HOSPADM

## 2023-07-14 RX ORDER — SODIUM CHLORIDE, SODIUM LACTATE, POTASSIUM CHLORIDE, CALCIUM CHLORIDE 600; 310; 30; 20 MG/100ML; MG/100ML; MG/100ML; MG/100ML
INJECTION, SOLUTION INTRAVENOUS CONTINUOUS PRN
Status: DISCONTINUED | OUTPATIENT
Start: 2023-07-14 | End: 2023-07-14

## 2023-07-14 RX ORDER — OXYCODONE HYDROCHLORIDE AND ACETAMINOPHEN 5; 325 MG/1; MG/1
1 TABLET ORAL EVERY 4 HOURS PRN
Qty: 10 TABLET | Refills: 0 | Status: SHIPPED | OUTPATIENT
Start: 2023-07-14

## 2023-07-14 RX ORDER — MAGNESIUM HYDROXIDE 1200 MG/15ML
LIQUID ORAL AS NEEDED
Status: DISCONTINUED | OUTPATIENT
Start: 2023-07-14 | End: 2023-07-14 | Stop reason: HOSPADM

## 2023-07-14 RX ORDER — BUPIVACAINE HYDROCHLORIDE 2.5 MG/ML
INJECTION, SOLUTION EPIDURAL; INFILTRATION; INTRACAUDAL AS NEEDED
Status: DISCONTINUED | OUTPATIENT
Start: 2023-07-14 | End: 2023-07-14 | Stop reason: HOSPADM

## 2023-07-14 RX ORDER — PROPOFOL 10 MG/ML
INJECTION, EMULSION INTRAVENOUS AS NEEDED
Status: DISCONTINUED | OUTPATIENT
Start: 2023-07-14 | End: 2023-07-14

## 2023-07-14 RX ORDER — ONDANSETRON 2 MG/ML
INJECTION INTRAMUSCULAR; INTRAVENOUS AS NEEDED
Status: DISCONTINUED | OUTPATIENT
Start: 2023-07-14 | End: 2023-07-14

## 2023-07-14 RX ORDER — LIDOCAINE HYDROCHLORIDE 10 MG/ML
INJECTION, SOLUTION EPIDURAL; INFILTRATION; INTRACAUDAL; PERINEURAL AS NEEDED
Status: DISCONTINUED | OUTPATIENT
Start: 2023-07-14 | End: 2023-07-14

## 2023-07-14 RX ORDER — HYDROMORPHONE HCL/PF 1 MG/ML
0.5 SYRINGE (ML) INJECTION
Status: DISCONTINUED | OUTPATIENT
Start: 2023-07-14 | End: 2023-07-14 | Stop reason: HOSPADM

## 2023-07-14 RX ORDER — FENTANYL CITRATE 50 UG/ML
INJECTION, SOLUTION INTRAMUSCULAR; INTRAVENOUS AS NEEDED
Status: DISCONTINUED | OUTPATIENT
Start: 2023-07-14 | End: 2023-07-14

## 2023-07-14 RX ORDER — EPHEDRINE SULFATE 50 MG/ML
INJECTION INTRAVENOUS AS NEEDED
Status: DISCONTINUED | OUTPATIENT
Start: 2023-07-14 | End: 2023-07-14

## 2023-07-14 RX ORDER — DEXAMETHASONE SODIUM PHOSPHATE 10 MG/ML
INJECTION, SOLUTION INTRAMUSCULAR; INTRAVENOUS AS NEEDED
Status: DISCONTINUED | OUTPATIENT
Start: 2023-07-14 | End: 2023-07-14

## 2023-07-14 RX ORDER — ACETAMINOPHEN 325 MG/1
650 TABLET ORAL EVERY 4 HOURS PRN
Status: DISCONTINUED | OUTPATIENT
Start: 2023-07-14 | End: 2023-07-14 | Stop reason: HOSPADM

## 2023-07-14 RX ADMIN — HYDROMORPHONE HYDROCHLORIDE 0.5 MG: 1 INJECTION, SOLUTION INTRAMUSCULAR; INTRAVENOUS; SUBCUTANEOUS at 12:19

## 2023-07-14 RX ADMIN — FENTANYL CITRATE 50 MCG: 50 INJECTION, SOLUTION INTRAMUSCULAR; INTRAVENOUS at 10:06

## 2023-07-14 RX ADMIN — SODIUM CHLORIDE, SODIUM LACTATE, POTASSIUM CHLORIDE, AND CALCIUM CHLORIDE: .6; .31; .03; .02 INJECTION, SOLUTION INTRAVENOUS at 08:03

## 2023-07-14 RX ADMIN — LIDOCAINE HYDROCHLORIDE 50 MG: 10 INJECTION, SOLUTION EPIDURAL; INFILTRATION; INTRACAUDAL; PERINEURAL at 10:06

## 2023-07-14 RX ADMIN — EPHEDRINE SULFATE 10 MG: 50 INJECTION INTRAVENOUS at 10:40

## 2023-07-14 RX ADMIN — DEXAMETHASONE SODIUM PHOSPHATE 10 MG: 10 INJECTION, SOLUTION INTRAMUSCULAR; INTRAVENOUS at 10:06

## 2023-07-14 RX ADMIN — FENTANYL CITRATE 25 MCG: 50 INJECTION INTRAMUSCULAR; INTRAVENOUS at 11:40

## 2023-07-14 RX ADMIN — ONDANSETRON 4 MG: 2 INJECTION INTRAMUSCULAR; INTRAVENOUS at 10:06

## 2023-07-14 RX ADMIN — FENTANYL CITRATE 25 MCG: 50 INJECTION INTRAMUSCULAR; INTRAVENOUS at 11:37

## 2023-07-14 RX ADMIN — PROPOFOL 180 MG: 10 INJECTION, EMULSION INTRAVENOUS at 10:06

## 2023-07-14 RX ADMIN — FENTANYL CITRATE 50 MCG: 50 INJECTION, SOLUTION INTRAMUSCULAR; INTRAVENOUS at 09:59

## 2023-07-14 RX ADMIN — Medication 160 MG: at 10:06

## 2023-07-14 RX ADMIN — CLINDAMYCIN PHOSPHATE 900 MG: 18 INJECTION, SOLUTION INTRAMUSCULAR; INTRAVENOUS at 10:02

## 2023-07-14 RX ADMIN — MIDAZOLAM HYDROCHLORIDE 2 MG: 1 INJECTION, SOLUTION INTRAMUSCULAR; INTRAVENOUS at 09:59

## 2023-07-14 RX ADMIN — FENTANYL CITRATE 25 MCG: 50 INJECTION INTRAMUSCULAR; INTRAVENOUS at 11:33

## 2023-07-14 RX ADMIN — FENTANYL CITRATE 25 MCG: 50 INJECTION INTRAMUSCULAR; INTRAVENOUS at 11:30

## 2023-07-14 NOTE — INTERVAL H&P NOTE
H&P reviewed. After examining the patient I find no changes in the patients condition since the H&P had been written.     Vitals:    07/14/23 0747   BP: 116/70   Pulse: 75   Resp: 18   Temp: (!) 97.2 °F (36.2 °C)   SpO2: 95%

## 2023-07-14 NOTE — ANESTHESIA PREPROCEDURE EVALUATION
Procedure:  REPAIR HERNIA INCISIONAL (Abdomen)    Relevant Problems   ANESTHESIA (within normal limits)      CARDIO   (+) Essential hypertension      ENDO (within normal limits)      GI/HEPATIC (within normal limits)      /RENAL   (+) Chronic kidney disease, stage 3 (HCC)      GYN (within normal limits)      HEMATOLOGY (within normal limits)      MUSCULOSKELETAL   (+) Arthritis   (+) Low back pain with sciatica      NEURO/PSYCH   (+) Continuous opioid dependence (HCC)      PULMONARY (within normal limits)      Lab Results   Component Value Date    WBC 6.43 07/14/2023    HGB 12.4 07/14/2023    HCT 37.7 07/14/2023    MCV 95 07/14/2023     07/14/2023     Lab Results   Component Value Date    SODIUM 133 (L) 07/14/2023    K 5.1 07/14/2023     07/14/2023    CO2 27 07/14/2023    AGAP 4 07/14/2023    BUN 37 (H) 07/14/2023    CREATININE 1.67 (H) 07/14/2023    GLUC 154 (H) 01/30/2023    GLUF 106 (H) 07/14/2023    CALCIUM 9.2 07/14/2023    AST 26 07/14/2023    ALT 17 07/14/2023    ALKPHOS 51 07/14/2023    TP 7.3 07/14/2023    TBILI 0.39 07/14/2023    EGFR 43 07/14/2023     Lab Results   Component Value Date    PTT 30 05/31/2022     Lab Results   Component Value Date    INR 1.14 05/31/2022    INR 1.11 03/29/2022    PROTIME 14.3 05/31/2022    PROTIME 14.0 03/29/2022         Physical Exam    Airway    Mallampati score: II  TM Distance: >3 FB  Neck ROM: full     Dental   upper dentures and lower dentures,     Cardiovascular  Rhythm: regular, Rate: normal, Cardiovascular exam normal    Pulmonary  Pulmonary exam normal Breath sounds clear to auscultation,     Other Findings        Anesthesia Plan  ASA Score- 3     Anesthesia Type- general with ASA Monitors. Additional Monitors:   Airway Plan: ETT. Plan Factors-Exercise tolerance (METS): >4 METS. Chart reviewed. EKG reviewed. Imaging results reviewed. Existing labs reviewed. Patient summary reviewed. Patient is not a current smoker.  Patient did not smoke on day of surgery. Obstructive sleep apnea risk education given perioperatively. Induction- intravenous. Postoperative Plan- Plan for postoperative opioid use. Informed Consent- Anesthetic plan and risks discussed with patient. I personally reviewed this patient with the CRNA. Discussed and agreed on the Anesthesia Plan with the CRNA. Anthony Izaguirre

## 2023-07-14 NOTE — OP NOTE
OPERATIVE REPORT  PATIENT NAME: Roshni Crain    :  1965  MRN: [id number]  Pt Location: [facility] OR [facility] 06    SURGERY DATE: 2023    Surgeon(s) and Role:     * Kayy Huston MD - Primary        Jalen Tinoco, CLARISSA - Assisting as no qualified surgical resident is available. Her assistance is needed for exposure and retraction. Preop Diagnosis:  Incisional hernia [K43.2], incarcerated    Post-Op Diagnosis Codes:     * Incisional hernia [K43.2] incarcerated    Procedure(s):  REPAIR HERNIA INCISIONAL with mesh, incarcerated    Specimen(s):  * No specimens in log *    Estimated Blood Loss:   Minimal    Drains:  Open Drain Left;Lateral Foot (Active)   Number of days: 403       Anesthesia Type:   General    Operative Indications:  Incisional hernia [K43.2]    Independent, non-smoker, ASA 3, wound class II, BMI 35, weight 260, height 72  Hypertension, stage III chronic kidney disease, arthritis, opioid dependence. Complications:   None    Procedure and Technique: Patient was identified visually and by armband. Placed in supine position. After anesthesia the abdomen was prepped and draped in a sterile fashion. 1/4% Marcaine with epinephrine was utilized throughout the procedure. Incision was made at the umbilicus into the right of midline. This carried down through skin subtenons tissue. Incarcerated omentum was then present. The hernia sac was dissected free from the subcutaneous tissues. This was carried down to the level of the hernia defect. Lysis of adhesions against the anterior abdominal wall was then completed in order to reduce the incarcerated omentum. Hernia defect was then found to represent 1.5 x 1.5 cm. A composite polypropylene mesh was then inserted. This was sewn in place with #1 Vicryl suture. Followed by 3-0 Vicryl subcutaneous and 4-0 Monocryl sutures. Dermabond was applied. The patient tolerated this procedure well. Sponge and instrument count correct x2.      I was present for the entire procedure.     Patient Disposition:  PACU         SIGNATURE: Disha Perry MD  DATE: July 14, 2023  TIME: 10:56 AM

## 2023-07-14 NOTE — ANESTHESIA POSTPROCEDURE EVALUATION
Post-Op Assessment Note    CV Status:  Stable  Pain Score: 0    Pain management: adequate     Mental Status:  Alert   Hydration Status:  Stable   PONV Controlled:  None   Airway Patency:  Patent      Post Op Vitals Reviewed: Yes      Staff: CRNA, Anesthesiologist         No notable events documented.     BP   116/70   Temp  36.2   Pulse  75   Resp   18   SpO2   95

## 2023-07-16 LAB
ATRIAL RATE: 74 BPM
P AXIS: 41 DEGREES
PR INTERVAL: 182 MS
QRS AXIS: 15 DEGREES
QRSD INTERVAL: 88 MS
QT INTERVAL: 388 MS
QTC INTERVAL: 430 MS
T WAVE AXIS: 57 DEGREES
VENTRICULAR RATE: 74 BPM

## 2023-07-16 PROCEDURE — 93010 ELECTROCARDIOGRAM REPORT: CPT | Performed by: INTERNAL MEDICINE

## 2023-07-25 DIAGNOSIS — M79.605 LEFT LEG PAIN: ICD-10-CM

## 2023-07-25 RX ORDER — TRAMADOL HYDROCHLORIDE 50 MG/1
TABLET ORAL
Qty: 60 TABLET | Refills: 0 | Status: SHIPPED | OUTPATIENT
Start: 2023-07-25

## 2023-07-31 ENCOUNTER — OFFICE VISIT (OUTPATIENT)
Dept: SURGERY | Facility: CLINIC | Age: 58
End: 2023-07-31
Payer: MEDICARE

## 2023-07-31 DIAGNOSIS — K43.2 INCISIONAL HERNIA WITHOUT OBSTRUCTION OR GANGRENE: Primary | ICD-10-CM

## 2023-07-31 PROCEDURE — 99212 OFFICE O/P EST SF 10 MIN: CPT | Performed by: SURGERY

## 2023-07-31 NOTE — PROGRESS NOTES
Assessment/Plan: Patient is status post repair of an incisional hernia at the umbilicus. Returns today for follow-up visit. He feels well. Incisions clean healing nicely. No evidence for infection. Activity instructions provided. There are no diagnoses linked to this encounter. Subjective:      Patient ID: Verona Yan is a 59 y.o. male. Patient presents post operatively. Incisional hernia repair 7/14/2023. The following portions of the patient's history were reviewed and updated as appropriate:     He  has a past medical history of Chronic pain disorder, Diabetes mellitus ([address]), H/O eye surgery, High blood sugar, Hypertension, and Liver disease. He  has a past surgical history that includes Hernia repair; Kidney surgery; Mouth surgery; Wound debridement (Left, 4/28/2022); pr amputation metatarsal w/toe single (Left, 6/1/2022); Wound debridement (Left, 6/6/2022); and pr rpr aa hernia 1st < 3 cm reducible (N/A, 7/14/2023). His family history is not on file. He  reports that he has never smoked. He has never used smokeless tobacco. He reports current alcohol use. He reports that he does not use drugs.   Current Outpatient Medications   Medication Sig Dispense Refill   • traMADol (Ultram) 50 mg tablet 2 tablets in evening 60 tablet 0   • Accu-Chek FastClix Lancets MISC Use to test blood sugar once a day 102 each 0   • Alpha-Lipoic Acid 600 MG CAPS Take 600 mg by mouth 2 (two) times a day       • Apple Cider Vinegar 300 MG TABS Take 300 mg by mouth daily       • Ascorbic Acid (vitamin C) 1000 MG tablet Take 1,000 mg by mouth 2 (two) times a day 2 tablet twice a day     • BENFOTIAMINE PO Take 900 mg by mouth 2 (two) times a day     • beta carotene 30 MG capsule Take 30 mg by mouth 2 (two) times a day       • Blood Glucose Monitoring Suppl (Accu-Chek Guide Me) w/Device KIT Use to check blood sugar once a day 1 kit 0   • carvedilol (COREG) 25 mg tablet Take 1 tablet (25 mg total) by mouth 2 (two) times a day with meals 180 tablet 3   • Chromium Picolinate 200 MCG TABS Take by mouth     • Empagliflozin (Jardiance) 25 MG TABS Take 1 tablet (25 mg total) by mouth daily 90 tablet 3   • Garlic 0849 MG CAPS Take by mouth 2 (two) times a day      • glucose blood (Accu-Chek Guide) test strip Use to check blood sugar once a day 100 strip 0   • IRON, FERROUS SULFATE, PO Take by mouth     • lidocaine (Lidoderm) 5 % Apply 1 patch topically daily Remove & Discard patch within 12 hours or as directed by MD 10 patch 0   • lisinopril-hydrochlorothiazide (PRINZIDE,ZESTORETIC) 20-12.5 MG per tablet Take 1 tablet by mouth 2 (two) times a day 180 tablet 3   • Lutein-Bilberry (LUTEIN PLUS BILBERRY PO) Take by mouth     • oxyCODONE-acetaminophen (PERCOCET) 5-325 mg per tablet Take 1 tablet by mouth every 4 (four) hours as needed for moderate pain for up to 10 doses Max Daily Amount: 6 tablets 10 tablet 0   • prednisoLONE acetate (PRED FORTE) 1 % ophthalmic suspension      • pregabalin (LYRICA) 50 mg capsule      • Pyridoxine HCl (B-6 PO) Take by mouth     • semaglutide, 0.25 or 0.5 mg/dose, (Ozempic, 0.25 or 0.5 MG/DOSE,) 2 mg/1.5 mL injection pen Inject 0.19 mL (0.25 mg total) under the skin once a week 15 mL 3   • Thiamine Mononitrate (B1) 100 MG TABS Take 100 mg by mouth daily     • Turmeric (QC TUMERIC COMPLEX PO) Take 1,000 mg by mouth once Tumeric /curcimin     • vitamin B-12 (CYANOCOBALAMIN) 100 MCG TABS Take 50 mcg by mouth daily     • VITAMIN D PO Take by mouth 2 (two) times a day     • vitamin E, tocopherol, 400 units capsule Take 400 Units by mouth 2 (two) times a day     • Zinc 50 MG CAPS Take by mouth 2 (two) times a day        No current facility-administered medications for this visit. He is allergic to cephalexin and metformin. .    Review of Systems   Constitutional: Negative. Negative for activity change. HENT: Negative. Eyes: Negative. Respiratory: Negative. Cardiovascular: Negative. Gastrointestinal: Positive for abdominal pain. Endocrine: Negative. Genitourinary: Negative. Musculoskeletal: Negative. Skin: Negative. Allergic/Immunologic: Negative. Neurological: Negative. Psychiatric/Behavioral: Negative for agitation, behavioral problems and confusion. The patient is not nervous/anxious. Objective: There were no vitals taken for this visit. Physical Exam  Constitutional:       Appearance: He is well-developed. He is not diaphoretic. HENT:      Head: Normocephalic and atraumatic. Eyes:      General: No scleral icterus. Right eye: No discharge. Left eye: No discharge. Extraocular Movements: Extraocular movements intact. Conjunctiva/sclera: Conjunctivae normal.   Neck:      Thyroid: No thyromegaly. Trachea: No tracheal deviation. Cardiovascular:      Heart sounds: No murmur heard. No friction rub. Pulmonary:      Effort: Pulmonary effort is normal. No respiratory distress. Breath sounds: No stridor. No wheezing. Chest:      Chest wall: No tenderness. Abdominal:      General: There is no distension. Palpations: There is no mass. Tenderness: There is no abdominal tenderness. There is no guarding or rebound. Musculoskeletal:         General: No tenderness. Right lower leg: No edema. Left lower leg: No edema. Lymphadenopathy:      Cervical: No cervical adenopathy. Skin:     General: Skin is warm and dry. Findings: No erythema or rash. Comments: Incision is clean and healing well. Neurological:      Mental Status: He is alert and oriented to person, place, and time. Cranial Nerves: No cranial nerve deficit.       Coordination: Coordination normal.   Psychiatric:         Behavior: Behavior normal.         Judgment: Judgment normal.

## 2023-08-14 PROBLEM — S61.214A LACERATION OF RIGHT RING FINGER WITHOUT FOREIGN BODY WITHOUT DAMAGE TO NAIL: Status: RESOLVED | Noted: 2023-06-15 | Resolved: 2023-08-14

## 2023-08-22 DIAGNOSIS — M79.605 LEFT LEG PAIN: ICD-10-CM

## 2023-08-22 DIAGNOSIS — I10 ESSENTIAL HYPERTENSION: ICD-10-CM

## 2023-08-23 RX ORDER — TRAMADOL HYDROCHLORIDE 50 MG/1
TABLET ORAL
Qty: 60 TABLET | Refills: 0 | Status: SHIPPED | OUTPATIENT
Start: 2023-08-23

## 2023-08-23 RX ORDER — LISINOPRIL AND HYDROCHLOROTHIAZIDE 20; 12.5 MG/1; MG/1
1 TABLET ORAL 2 TIMES DAILY
Qty: 180 TABLET | Refills: 3 | Status: SHIPPED | OUTPATIENT
Start: 2023-08-23

## 2023-08-23 RX ORDER — CARVEDILOL 25 MG/1
25 TABLET ORAL 2 TIMES DAILY WITH MEALS
Qty: 180 TABLET | Refills: 3 | Status: SHIPPED | OUTPATIENT
Start: 2023-08-23

## 2023-09-20 DIAGNOSIS — M79.605 LEFT LEG PAIN: ICD-10-CM

## 2023-09-20 RX ORDER — TRAMADOL HYDROCHLORIDE 50 MG/1
TABLET ORAL
Qty: 60 TABLET | Refills: 0 | Status: SHIPPED | OUTPATIENT
Start: 2023-09-20

## 2023-09-22 ENCOUNTER — OFFICE VISIT (OUTPATIENT)
Dept: INTERNAL MEDICINE CLINIC | Facility: CLINIC | Age: 58
End: 2023-09-22
Payer: MEDICARE

## 2023-09-22 VITALS
SYSTOLIC BLOOD PRESSURE: 132 MMHG | HEIGHT: 72 IN | BODY MASS INDEX: 35.62 KG/M2 | OXYGEN SATURATION: 96 % | HEART RATE: 79 BPM | WEIGHT: 263 LBS | DIASTOLIC BLOOD PRESSURE: 72 MMHG | TEMPERATURE: 97.6 F
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
## 2023-09-22 DIAGNOSIS — S61.452D DOG BITE OF LEFT HAND, SUBSEQUENT ENCOUNTER: Primary | ICD-10-CM

## 2023-09-22 PROCEDURE — 99213 OFFICE O/P EST LOW 20 MIN: CPT | Performed by: INTERNAL MEDICINE

## 2023-09-22 RX ORDER — KETOROLAC TROMETHAMINE 5 MG/ML
SOLUTION OPHTHALMIC
COMMUNITY
Start: 2023-08-22

## 2023-09-22 RX ORDER — AMOXICILLIN AND CLAVULANATE POTASSIUM 875; 125 MG/1; MG/1
1 TABLET, FILM COATED ORAL EVERY 12 HOURS SCHEDULED
Qty: 28 TABLET | Refills: 0 | Status: SHIPPED | OUTPATIENT
Start: 2023-09-22 | End: 2023-10-06

## 2023-09-22 RX ORDER — PREGABALIN 150 MG/1
CAPSULE ORAL
COMMUNITY
Start: 2023-09-13

## 2023-09-22 RX ORDER — AZITHROMYCIN 250 MG/1
TABLET, FILM COATED ORAL
COMMUNITY
Start: 2023-09-13

## 2023-09-22 NOTE — PROGRESS NOTES
Name: Yoly Niño      : 1965      MRN: [id number]  Encounter Provider: Andressa Pereira DO  Encounter Date: 2023   Encounter department: [address] [place] [address] 190     1. Dog bite of left hand, subsequent encounter  -     amoxicillin-clavulanate (AUGMENTIN) 875-125 mg per tablet; Take 1 tablet by mouth every 12 (twelve) hours for 14 days  -     mupirocin (BACTROBAN) 2 % ointment; Apply topically 2 (two) times a day           Subjective      Jarod Hinton presents today for visit regarding a new dog bite on his left hand. The bite occurred over the past weekend -. His dog is blind and has bitten him before, he has a scar on his right hand from a few weeks ago, but this is healing well. On the left hand's current bite, there is some redness and induration on the dorsum. On his second DIP joint , the knuckle is whitish and indurated, limiting range of motion of the joint. However, he has not had any purulent drainage, fevers, chills, loss of motion or sensation. He has a reported allergy to cephalexin, but when he took Augmentin earlier this year for a dog bite, he had no side effects. Review of Systems   Constitutional: Negative for chills and fever. Respiratory: Negative for shortness of breath. Cardiovascular: Negative for chest pain. Gastrointestinal: Negative for abdominal pain, diarrhea, nausea and vomiting. Skin: Positive for wound.        Current Outpatient Medications on File Prior to Visit   Medication Sig   • Accu-Chek FastClix Lancets MISC Use to test blood sugar once a day   • Alpha-Lipoic Acid 600 MG CAPS Take 600 mg by mouth 2 (two) times a day     • Apple Cider Vinegar 300 MG TABS Take 300 mg by mouth daily     • Ascorbic Acid (vitamin C) 1000 MG tablet Take 1,000 mg by mouth 2 (two) times a day 2 tablet twice a day   • azithromycin (ZITHROMAX) 250 mg tablet    • BENFOTIAMINE PO    • beta carotene 30 MG capsule Take 30 mg by mouth 2 (two) times a day     • Blood Glucose Monitoring Suppl (Accu-Chek Guide Me) w/Device KIT Use to check blood sugar once a day   • carvedilol (COREG) 25 mg tablet Take 1 tablet (25 mg total) by mouth 2 (two) times a day with meals   • Chromium Picolinate 200 MCG TABS Take by mouth   • Empagliflozin (Jardiance) 25 MG TABS Take 1 tablet (25 mg total) by mouth daily   • Garlic 3647 MG CAPS Take by mouth 2 (two) times a day    • glucose blood (Accu-Chek Guide) test strip Use to check blood sugar once a day   • IRON, FERROUS SULFATE, PO Take by mouth   • ketorolac (ACULAR) 0.5 % ophthalmic solution    • lidocaine (Lidoderm) 5 % Apply 1 patch topically daily Remove & Discard patch within 12 hours or as directed by MD   • lisinopril-hydrochlorothiazide (PRINZIDE,ZESTORETIC) 20-12.5 MG per tablet Take 1 tablet by mouth 2 (two) times a day   • Lutein-Bilberry (LUTEIN PLUS BILBERRY PO) Take by mouth   • oxyCODONE-acetaminophen (PERCOCET) 5-325 mg per tablet Take 1 tablet by mouth every 4 (four) hours as needed for moderate pain for up to 10 doses Max Daily Amount: 6 tablets   • prednisoLONE acetate (PRED FORTE) 1 % ophthalmic suspension    • pregabalin (LYRICA) 150 mg capsule    • Pyridoxine HCl (B-6 PO) Take by mouth   • semaglutide, 0.25 or 0.5 mg/dose, (Ozempic, 0.25 or 0.5 MG/DOSE,) 2 mg/1.5 mL injection pen Inject 0.19 mL (0.25 mg total) under the skin once a week   • traMADol (Ultram) 50 mg tablet 2 tablets in evening   • Turmeric (QC TUMERIC COMPLEX PO) Take 1,000 mg by mouth once Tumeric /curcimin   • vitamin B-12 (CYANOCOBALAMIN) 100 MCG TABS Take 50 mcg by mouth daily   • VITAMIN D PO Take by mouth 2 (two) times a day   • vitamin E, tocopherol, 400 units capsule Take 400 Units by mouth 2 (two) times a day   • Zinc 50 MG CAPS Take by mouth 2 (two) times a day    • [DISCONTINUED] BENFOTIAMINE PO Take 900 mg by mouth 2 (two) times a day   • [DISCONTINUED] pregabalin (LYRICA) 50 mg capsule    • [DISCONTINUED] Thiamine Mononitrate (B1) 100 MG TABS Take 100 mg by mouth daily       Objective     /72   Pulse 79   Temp 97.6 °F (36.4 °C) (Tympanic)   Ht 6' (1.829 m)   Wt 119 kg (263 lb)   SpO2 96%   BMI 35.67 kg/m²     Physical Exam  Constitutional:       Appearance: Normal appearance. He is not ill-appearing. HENT:      Head: Normocephalic and atraumatic. Mouth/Throat:      Mouth: Mucous membranes are moist.      Pharynx: Oropharynx is clear. Eyes:      Extraocular Movements: Extraocular movements intact. Pulmonary:      Effort: Pulmonary effort is normal.   Musculoskeletal:         General: Normal range of motion. Skin:     General: Skin is warm and dry. Findings: Lesion present. Comments: See below. No evidence of infectious tenosynovitis or tendon rupture. DIP tender to palpation, but dorsum of hand is not. Neurological:      Mental Status: He is alert and oriented to person, place, and time. Mental status is at baseline.    Psychiatric:         Mood and Affect: Mood normal.                   Laymon Loots, DO

## 2023-10-10 ENCOUNTER — RA CDI HCC (OUTPATIENT)
Dept: OTHER | Facility: HOSPITAL | Age: 58
End: 2023-10-10

## 2023-10-10 NOTE — PROGRESS NOTES
E11.22, E11.42  [address] [facility] coding opportunities          Chart Reviewed number of suggestions sent to Provider: 2     Patients Insurance     Medicare Insurance: [employer]

## 2023-10-16 ENCOUNTER — TELEPHONE (OUTPATIENT)
Dept: ENDOCRINOLOGY | Facility: CLINIC | Age: 58
End: 2023-10-16

## 2023-10-16 DIAGNOSIS — E11.69 TYPE 2 DIABETES MELLITUS WITH OTHER SPECIFIED COMPLICATION, WITHOUT LONG-TERM CURRENT USE OF INSULIN (HCC): ICD-10-CM

## 2023-10-16 NOTE — TELEPHONE ENCOUNTER
Patient called and and he has been waiting for his Jardiance for 2 weeks and needs it called into [phone]  And he said this is not the first time that this has happened and that it is happening all the time . He said you tell Dr Sirisha Thompson that I am pissed that I am pissed and he kept repeating that.  He wanted to speak to the manager and I spoke to Mili and gave her his info and she is going to call him  Thank you

## 2023-10-17 ENCOUNTER — OFFICE VISIT (OUTPATIENT)
Dept: INTERNAL MEDICINE CLINIC | Facility: CLINIC | Age: 58
End: 2023-10-17
Payer: MEDICARE

## 2023-10-17 ENCOUNTER — TELEPHONE (OUTPATIENT)
Age: 58
End: 2023-10-17

## 2023-10-17 VITALS
WEIGHT: 269.8 LBS | HEART RATE: 83 BPM | TEMPERATURE: 97.5 F | OXYGEN SATURATION: 97 % | BODY MASS INDEX: 36.54 KG/M2 | SYSTOLIC BLOOD PRESSURE: 124 MMHG | HEIGHT: 72 IN | DIASTOLIC BLOOD PRESSURE: 78 MMHG

## 2023-10-17 DIAGNOSIS — E11.69 TYPE 2 DIABETES MELLITUS WITH OTHER SPECIFIED COMPLICATION, WITHOUT LONG-TERM CURRENT USE OF INSULIN (HCC): ICD-10-CM

## 2023-10-17 DIAGNOSIS — M19.90 ARTHRITIS: ICD-10-CM

## 2023-10-17 DIAGNOSIS — I10 ESSENTIAL HYPERTENSION: Primary | ICD-10-CM

## 2023-10-17 DIAGNOSIS — M72.0 DUPUYTREN'S DISEASE OF FINGER WITH NODULES WITHOUT CONTRACTURE: ICD-10-CM

## 2023-10-17 PROCEDURE — 99214 OFFICE O/P EST MOD 30 MIN: CPT | Performed by: INTERNAL MEDICINE

## 2023-10-17 NOTE — ASSESSMENT & PLAN NOTE
Dupuytrens fractures of fingers of both hands recommend orthopedic consultation for evaluation and management.

## 2023-10-17 NOTE — ASSESSMENT & PLAN NOTE
Recommend continuation of current therapy of diabetes as managed by endocrinology.   Most recent notes reviewed  Lab Results   Component Value Date    HGBA1C 5.5 07/14/2023

## 2023-10-17 NOTE — TELEPHONE ENCOUNTER
Patient is being referred to a orthopedics. Please schedule accordingly.     [facility]   [phone]

## 2023-10-17 NOTE — ASSESSMENT & PLAN NOTE
Blood pressure assessment today confirms good control of hypertension recommend the continuation of carvedilol, lisinopril hydrochlorothiazide for management of hypertension.

## 2023-10-17 NOTE — ASSESSMENT & PLAN NOTE
Arthritis of both hands with Heberden nodules present on several digits. Patient is interested to see if these can be removed as they are bothersome to him. I have referred him on to orthopedics at [facility] for further evaluation.

## 2023-10-17 NOTE — PROGRESS NOTES
Name: Janet Crooks      : 1965      MRN: [id number]  Encounter Provider: Raghavendra Skinner MD  Encounter Date: 10/17/2023   Encounter department: [address] [place] [address]     1. Dupuytren's disease of finger with nodules without contracture  Assessment & Plan:  Dupuytrens fractures of fingers of both hands recommend orthopedic consultation for evaluation and management. Orders:  -     Ambulatory Referral to Orthopedic Surgery; Future    2. Type 2 diabetes mellitus with other specified complication, without long-term current use of insulin (HCC)  Assessment & Plan:  Recommend continuation of current therapy of diabetes as managed by endocrinology. Most recent notes reviewed  Lab Results   Component Value Date    HGBA1C 5.5 2023       Orders:  -     Ambulatory Referral to Endocrinology; Future    3. Essential hypertension  Assessment & Plan:  Blood pressure assessment today confirms good control of hypertension recommend the continuation of carvedilol, lisinopril hydrochlorothiazide for management of hypertension. 4. Arthritis  Assessment & Plan:  Arthritis of both hands with Heberden nodules present on several digits. Patient is interested to see if these can be removed as they are bothersome to him. I have referred him on to orthopedics at [facility] for further evaluation. Depression Screening and Follow-up Plan: Patient was screened for depression during today's encounter. They screened negative with a PHQ-2 score of 0. Subjective      This 78-year-old gentleman presents to our office today for a follow-up assessment of his hypertension. He is also concerned about nodularity at the tips of his fingers on several digits. He does have a history of arthritis of both hands. He recently experienced several bites by his own dog.   He was seen in our office for these dog bites and prescribed topical antibiotic ointment as well as oral antibiotic therapy with Augmentin. Darylene Pipe The wounds appear to be healing nicely with no evidence of any active infection. The patient's type 2 diabetes is managed by endocrinology services. He follows up with them on a regular basis. During today's visit we have assessed the patient's blood pressure. Review of Systems   Musculoskeletal:  Positive for arthralgias. Skin:         Healing dog bites of both hands with no indication of active infection. All other systems reviewed and are negative.       Current Outpatient Medications on File Prior to Visit   Medication Sig    Accu-Chek FastClix Lancets MISC Use to test blood sugar once a day    Alpha-Lipoic Acid 600 MG CAPS Take 600 mg by mouth 2 (two) times a day      Apple Cider Vinegar 300 MG TABS Take 300 mg by mouth daily      Ascorbic Acid (vitamin C) 1000 MG tablet Take 1,000 mg by mouth 2 (two) times a day 2 tablet twice a day    BENFOTIAMINE PO     beta carotene 30 MG capsule Take 30 mg by mouth 2 (two) times a day      Blood Glucose Monitoring Suppl (Accu-Chek Guide Me) w/Device KIT Use to check blood sugar once a day    carvedilol (COREG) 25 mg tablet Take 1 tablet (25 mg total) by mouth 2 (two) times a day with meals    Chromium Picolinate 200 MCG TABS Take by mouth    Empagliflozin (Jardiance) 25 MG TABS Take 1 tablet (25 mg total) by mouth daily    Garlic 1429 MG CAPS Take by mouth 2 (two) times a day     glucose blood (Accu-Chek Guide) test strip Use to check blood sugar once a day    IRON, FERROUS SULFATE, PO Take by mouth    ketorolac (ACULAR) 0.5 % ophthalmic solution     lidocaine (Lidoderm) 5 % Apply 1 patch topically daily Remove & Discard patch within 12 hours or as directed by MD    lisinopril-hydrochlorothiazide (PRINZIDE,ZESTORETIC) 20-12.5 MG per tablet Take 1 tablet by mouth 2 (two) times a day    Lutein-Bilberry (LUTEIN PLUS BILBERRY PO) Take by mouth    pregabalin (LYRICA) 150 mg capsule     Pyridoxine HCl (B-6 PO) Take by mouth semaglutide, 0.25 or 0.5 mg/dose, (Ozempic, 0.25 or 0.5 MG/DOSE,) 2 mg/1.5 mL injection pen Inject 0.19 mL (0.25 mg total) under the skin once a week    traMADol (Ultram) 50 mg tablet 2 tablets in evening    Turmeric (QC TUMERIC COMPLEX PO) Take 1,000 mg by mouth once Tumeric /curcimin    vitamin B-12 (CYANOCOBALAMIN) 100 MCG TABS Take 50 mcg by mouth daily    VITAMIN D PO Take by mouth 2 (two) times a day    vitamin E, tocopherol, 400 units capsule Take 400 Units by mouth 2 (two) times a day    Zinc 50 MG CAPS Take by mouth 2 (two) times a day     [DISCONTINUED] azithromycin (ZITHROMAX) 250 mg tablet     [DISCONTINUED] mupirocin (BACTROBAN) 2 % ointment Apply topically 2 (two) times a day    [DISCONTINUED] oxyCODONE-acetaminophen (PERCOCET) 5-325 mg per tablet Take 1 tablet by mouth every 4 (four) hours as needed for moderate pain for up to 10 doses Max Daily Amount: 6 tablets    [DISCONTINUED] prednisoLONE acetate (PRED FORTE) 1 % ophthalmic suspension        Objective     /78   Pulse 83   Temp 97.5 °F (36.4 °C)   Ht 6' (1.829 m)   Wt 122 kg (269 lb 12.8 oz)   SpO2 97%   BMI 36.59 kg/m²     Physical Exam  Dakota Isaac MD

## 2023-10-17 NOTE — TELEPHONE ENCOUNTER
Spoke to patient regarding issues with receiving refills. He states his pharmacy reach out to the office for a refill request. I did make the patient aware that the office could give him samples until he gets his refill.

## 2023-10-19 ENCOUNTER — PATIENT OUTREACH (OUTPATIENT)
Dept: INTERNAL MEDICINE CLINIC | Facility: CLINIC | Age: 58
End: 2023-10-19

## 2023-10-19 NOTE — PROGRESS NOTES
Attempted outreach no answer voicemail is full. Will hand off to Savannah Clark RN high utilizer team  Patient attending medical appointments.

## 2023-10-26 ENCOUNTER — HOSPITAL ENCOUNTER (EMERGENCY)
Facility: HOSPITAL | Age: 58
Discharge: HOME | End: 2023-10-26
Attending: EMERGENCY MEDICINE
Payer: MEDICARE

## 2023-10-26 ENCOUNTER — APPOINTMENT (EMERGENCY)
Dept: RADIOLOGY | Facility: HOSPITAL | Age: 58
End: 2023-10-26
Payer: MEDICARE

## 2023-10-26 VITALS
OXYGEN SATURATION: 96 % | TEMPERATURE: 97.8 F | SYSTOLIC BLOOD PRESSURE: 161 MMHG | RESPIRATION RATE: 18 BRPM | HEART RATE: 80 BPM | DIASTOLIC BLOOD PRESSURE: 87 MMHG

## 2023-10-26 DIAGNOSIS — S90.32XA CONTUSION OF LEFT FOOT, INITIAL ENCOUNTER: Primary | ICD-10-CM

## 2023-10-26 DIAGNOSIS — M79.605 LEFT LEG PAIN: ICD-10-CM

## 2023-10-26 PROCEDURE — 99283 EMERGENCY DEPT VISIT LOW MDM: CPT

## 2023-10-26 PROCEDURE — 73630 X-RAY EXAM OF FOOT: CPT

## 2023-10-26 PROCEDURE — 99284 EMERGENCY DEPT VISIT MOD MDM: CPT | Performed by: PHYSICIAN ASSISTANT

## 2023-10-26 RX ORDER — ACETAMINOPHEN 325 MG/1
975 TABLET ORAL ONCE
Status: COMPLETED | OUTPATIENT
Start: 2023-10-26 | End: 2023-10-26

## 2023-10-26 RX ADMIN — ACETAMINOPHEN 325MG 975 MG: 325 TABLET ORAL at 16:49

## 2023-10-26 NOTE — ED PROVIDER NOTES
History  Chief Complaint   Patient presents with    Foot Injury     Dropped 35lbs of paint on left foot yesterday. No pain meds pta. Ankle Pain  Location:  Foot  Time since incident:  1 day  Injury: yes    Mechanism of injury: crush    Crush:     Mechanism:  Falling object (Patient states 35 pounds of paint cans.)  Foot location:  L foot  Pain details:     Quality:  Aching    Radiates to:  Does not radiate    Severity:  Mild    Onset quality:  Sudden    Duration:  1 day    Timing:  Constant    Progression:  Unchanged  Chronicity:  New  Dislocation: no    Foreign body present:  No foreign bodies  Prior injury to area:  Yes (Prior amputation of the fifth toe.)  Ineffective treatments:  None tried  Associated symptoms: numbness, swelling and tingling    Associated symptoms: no fever    Associated symptoms comment:  Decreased sensation chronic secondary to diabetes and peripheral vascular disease. Risk factors: obesity    Risk factors: no frequent fractures    Risk factors comment:  Cardiovascular disease type 2 diabetes prior history of toe amputation      Prior to Admission Medications   Prescriptions Last Dose Informant Patient Reported? Taking?    Accu-Chek FastClix Lancets MISC  Self No No   Sig: Use to test blood sugar once a day   Alpha-Lipoic Acid 600 MG CAPS  Self Yes No   Sig: Take 600 mg by mouth 2 (two) times a day     Apple Cider Vinegar 300 MG TABS  Self Yes No   Sig: Take 300 mg by mouth daily     Ascorbic Acid (vitamin C) 1000 MG tablet  Self Yes No   Sig: Take 1,000 mg by mouth 2 (two) times a day 2 tablet twice a day   BENFOTIAMINE PO  Self Yes No   Blood Glucose Monitoring Suppl (Accu-Chek Guide Me) w/Device KIT  Self No No   Sig: Use to check blood sugar once a day   Chromium Picolinate 200 MCG TABS  Self Yes No   Sig: Take by mouth   Empagliflozin (Jardiance) 25 MG TABS   No No   Sig: Take 1 tablet (25 mg total) by mouth daily   Garlic 6618 MG CAPS  Self Yes No   Sig: Take by mouth 2 (two) times a day    IRON, FERROUS SULFATE, PO  Self Yes No   Sig: Take by mouth   Lutein-Bilberry (LUTEIN PLUS BILBERRY PO)  Self Yes No   Sig: Take by mouth   Pyridoxine HCl (B-6 PO)  Self Yes No   Sig: Take by mouth   Turmeric (QC TUMERIC COMPLEX PO)  Self Yes No   Sig: Take 1,000 mg by mouth once Tumeric /curcimin   VITAMIN D PO  Self Yes No   Sig: Take by mouth 2 (two) times a day   Zinc 50 MG CAPS  Self Yes No   Sig: Take by mouth 2 (two) times a day    beta carotene 30 MG capsule  Self Yes No   Sig: Take 30 mg by mouth 2 (two) times a day     carvedilol (COREG) 25 mg tablet  Self No No   Sig: Take 1 tablet (25 mg total) by mouth 2 (two) times a day with meals   glucose blood (Accu-Chek Guide) test strip  Self No No   Sig: Use to check blood sugar once a day   ketorolac (ACULAR) 0.5 % ophthalmic solution  Self Yes No   lidocaine (Lidoderm) 5 %  Self No No   Sig: Apply 1 patch topically daily Remove & Discard patch within 12 hours or as directed by MD   lisinopril-hydrochlorothiazide (PRINZIDE,ZESTORETIC) 20-12.5 MG per tablet  Self No No   Sig: Take 1 tablet by mouth 2 (two) times a day   pregabalin (LYRICA) 150 mg capsule  Self Yes No   semaglutide, 0.25 or 0.5 mg/dose, (Ozempic, 0.25 or 0.5 MG/DOSE,) 2 mg/1.5 mL injection pen  Self No No   Sig: Inject 0.19 mL (0.25 mg total) under the skin once a week   vitamin B-12 (CYANOCOBALAMIN) 100 MCG TABS  Self Yes No   Sig: Take 50 mcg by mouth daily   vitamin E, tocopherol, 400 units capsule  Self Yes No   Sig: Take 400 Units by mouth 2 (two) times a day      Facility-Administered Medications: None       Past Medical History:   Diagnosis Date    Chronic pain disorder     Diabetes mellitus (HCC)     H/O eye surgery     High blood sugar     Hypertension     Liver disease        Past Surgical History:   Procedure Laterality Date    HERNIA REPAIR      KIDNEY SURGERY      MOUTH SURGERY      [place] AMPUTATION METATARSAL W/TOE SINGLE Left 6/1/2022    Procedure: RAY RESECTION FOOT; Surgeon: Cristine Reed DPM;  [facility];  Service: Podiatry    [place] RPR AA HERNIA 1ST < 3 CM REDUCIBLE N/A 7/14/2023    Procedure: REPAIR HERNIA INCISIONAL;  Surgeon: Mary Pittman MD;  [facility];  Service: General    WOUND DEBRIDEMENT Left 4/28/2022    Procedure: Left foot washout;  Surgeon: Cristine Reed DPM;  [facility];  Service: Podiatry    WOUND DEBRIDEMENT Left 6/6/2022    Procedure: DEBRIDEMENT WOUND [facility] OUT); Surgeon: Cristine Reed DPM;  [facility];  Service: Podiatry       History reviewed. No pertinent family history. I have reviewed and agree with the history as documented. E-Cigarette/Vaping    E-Cigarette Use Never User      E-Cigarette/Vaping Substances    Nicotine No     THC No     CBD No     Flavoring No     Other No     Unknown No      Social History     Tobacco Use    Smoking status: Never    Smokeless tobacco: Never   Vaping Use    Vaping Use: Never used   Substance Use Topics    Alcohol use: Yes     Comment: ocassional    Drug use: Never       Review of Systems   Constitutional:  Negative for fever. Musculoskeletal:  Positive for arthralgias (Left dorsum foot). All other systems reviewed and are negative. Physical Exam  Physical Exam  Constitutional:       Appearance: Normal appearance. HENT:      Head: Normocephalic. Right Ear: External ear normal.      Left Ear: External ear normal.      Nose: Nose normal.      Mouth/Throat:      Pharynx: Oropharynx is clear. Cardiovascular:      Rate and Rhythm: Normal rate. Pulmonary:      Effort: Pulmonary effort is normal.   Abdominal:      General: There is no distension. Musculoskeletal:         General: Normal range of motion. Cervical back: Normal range of motion and neck supple. Feet:    Feet:      Comments: Small 0.5 cm blood blister healing at the heel. Patient states from prior injury with nail in boot. No obvious foreign body appreciated.   Well-healed amputation site. Skin:     General: Skin is warm and dry. Neurological:      General: No focal deficit present. Mental Status: He is alert. Vital Signs  ED Triage Vitals [10/26/23 1634]   Temperature Pulse Respirations Blood Pressure SpO2   97.8 °F (36.6 °C) 80 18 161/87 96 %      Temp src Heart Rate Source Patient Position - Orthostatic VS BP Location FiO2 (%)   -- -- -- -- --      Pain Score       6           Vitals:    10/26/23 1634   BP: 161/87   Pulse: 80         Visual Acuity      ED Medications  Medications   acetaminophen (TYLENOL) tablet 975 mg (975 mg Oral Given 10/26/23 1649)       Diagnostic Studies  Results Reviewed       None                   XR foot 3+ views LEFT   ED Interpretation by Alina Lindsay PA-C (10/26 1820)   No obvious fracture. Bony callus formation at the base of the fourth metatarsal new from prior imaging. Final Result by Tamera Foley MD (10/27 1154)      No acute osseous abnormality. Workstation performed: [id number]                    Procedures  Procedures         ED Course                                             Medical Decision Making  78-year-old male presents to the emergency department for left foot pain. Patient states that he dropped heavy objects of pain on his foot yesterday. Patient has prior history of amputation of the fifth toe left. Patient does have peripheral neuropathy. There is a subcutaneous blood blister at the heel which is well-healed. Patient states he does not feel the nail in his boot initially. Overall exam is benign. X-rays obtained shows callus formation at the head or midshaft of the fourth metatarsal.  No acute process appreciated. At this time will discharge to home and have patient follow-up with podiatry for further evaluation and management.   Educated patient on persistent or worsening signs and symptoms and to either follow-up with primary care orthopedics and or return to the emergency department. Patient admitted understanding and agreement. Amount and/or Complexity of Data Reviewed  Radiology: ordered and independent interpretation performed. Risk  OTC drugs. Disposition  Final diagnoses:   Contusion of left foot, initial encounter     Time reflects when diagnosis was documented in both MDM as applicable and the Disposition within this note       Time User Action Codes Description Comment    10/26/2023  6:19 PM Greg Lobo Contusion of left foot, initial encounter           ED Disposition       ED Disposition   Discharge    Condition   Stable    Date/Time   Thu Oct 26, 2023  6:22 PM    Comment   Geni Redman discharge to home/self care.                    Follow-up Information       Follow up With Specialties Details Why Contact Info Additional Information    [facility], [place]   [address]  [address] [facility] [id number]  [address] [place] [address], [address] [place] [address], [place] [id number] [place], [place], [address] [place] [address]            Discharge Medication List as of 10/26/2023  6:22 PM        CONTINUE these medications which have NOT CHANGED    Details   Accu-Chek FastClix Lancets MISC Use to test blood sugar once a day, Normal      Alpha-Lipoic Acid 600 MG CAPS Take 600 mg by mouth 2 (two) times a day  , Historical Med      Apple Cider Vinegar 300 MG TABS Take 300 mg by mouth daily  , Historical Med      Ascorbic Acid (vitamin C) 1000 MG tablet Take 1,000 mg by mouth 2 (two) times a day 2 tablet twice a day, Historical Med      BENFOTIAMINE PO Starting Thu 6/1/2023, Historical Med      beta carotene 30 MG capsule Take 30 mg by mouth 2 (two) times a day  , Historical Med      Blood Glucose Monitoring Suppl (Accu-Chek Guide Me) w/Device KIT Use to check blood sugar once a day, Normal      carvedilol (COREG) 25 mg tablet Take 1 tablet (25 mg total) by mouth 2 (two) times a day with meals, Starting Wed 8/23/2023, Normal      Chromium Picolinate 200 MCG TABS Take by mouth, Historical Med      Empagliflozin (Jardiance) 25 MG TABS Take 1 tablet (25 mg total) by mouth daily, Starting Mon 10/16/2023, Normal      Garlic 2204 MG CAPS Take by mouth 2 (two) times a day , Historical Med      glucose blood (Accu-Chek Guide) test strip Use to check blood sugar once a day, Normal      IRON, FERROUS SULFATE, PO Take by mouth, Historical Med      ketorolac (ACULAR) 0.5 % ophthalmic solution Starting Tue 8/22/2023, Historical Med      lidocaine (Lidoderm) 5 % Apply 1 patch topically daily Remove & Discard patch within 12 hours or as directed by MD, Starting Fri 7/29/2022, Normal      lisinopril-hydrochlorothiazide (PRINZIDE,ZESTORETIC) 20-12.5 MG per tablet Take 1 tablet by mouth 2 (two) times a day, Starting Wed 8/23/2023, Normal      Lutein-Bilberry (LUTEIN PLUS BILBERRY PO) Take by mouth, Historical Med      pregabalin (LYRICA) 150 mg capsule Starting Wed 9/13/2023, Historical Med      Pyridoxine HCl (B-6 PO) Take by mouth, Historical Med      semaglutide, 0.25 or 0.5 mg/dose, (Ozempic, 0.25 or 0.5 MG/DOSE,) 2 mg/1.5 mL injection pen Inject 0.19 mL (0.25 mg total) under the skin once a week, Starting Wed 4/26/2023, Normal      Turmeric (QC TUMERIC COMPLEX PO) Take 1,000 mg by mouth once Tumeric /curcimin, Historical Med      vitamin B-12 (CYANOCOBALAMIN) 100 MCG TABS Take 50 mcg by mouth daily, Historical Med      VITAMIN D PO Take by mouth 2 (two) times a day, Historical Med      vitamin E, tocopherol, 400 units capsule Take 400 Units by mouth 2 (two) times a day, Historical Med      Zinc 50 MG CAPS Take by mouth 2 (two) times a day , Historical Med      traMADol (Ultram) 50 mg tablet 2 tablets in evening, Normal             No discharge procedures on file.     PDMP Review         Value Time User    PDMP Reviewed  Yes 10/27/2023  7:08 AM Melissa Glass MD            ED Provider  Electronically Signed by             Chance Macdonald RONNY Clarke-C  10/28/23 3349

## 2023-10-27 RX ORDER — TRAMADOL HYDROCHLORIDE 50 MG/1
TABLET ORAL
Qty: 60 TABLET | Refills: 0 | Status: SHIPPED | OUTPATIENT
Start: 2023-10-27

## 2023-11-07 ENCOUNTER — OFFICE VISIT (OUTPATIENT)
Dept: ENDOCRINOLOGY | Facility: CLINIC | Age: 58
End: 2023-11-07
Payer: MEDICARE

## 2023-11-07 VITALS
DIASTOLIC BLOOD PRESSURE: 98 MMHG | HEART RATE: 82 BPM | WEIGHT: 272.4 LBS | HEIGHT: 72 IN | BODY MASS INDEX: 36.9 KG/M2 | SYSTOLIC BLOOD PRESSURE: 140 MMHG

## 2023-11-07 DIAGNOSIS — E11.22 TYPE 2 DIABETES MELLITUS WITH CHRONIC KIDNEY DISEASE, WITHOUT LONG-TERM CURRENT USE OF INSULIN, UNSPECIFIED CKD STAGE (HCC): Primary | ICD-10-CM

## 2023-11-07 DIAGNOSIS — E11.69 TYPE 2 DIABETES MELLITUS WITH OTHER SPECIFIED COMPLICATION, WITHOUT LONG-TERM CURRENT USE OF INSULIN (HCC): ICD-10-CM

## 2023-11-07 DIAGNOSIS — E66.01 CLASS 2 SEVERE OBESITY DUE TO EXCESS CALORIES WITH SERIOUS COMORBIDITY AND BODY MASS INDEX (BMI) OF 35.0 TO 35.9 IN ADULT: ICD-10-CM

## 2023-11-07 DIAGNOSIS — I10 ESSENTIAL HYPERTENSION: ICD-10-CM

## 2023-11-07 LAB — SL AMB POCT HEMOGLOBIN AIC: 6.2 (ref ?–6.5)

## 2023-11-07 PROCEDURE — 99214 OFFICE O/P EST MOD 30 MIN: CPT | Performed by: INTERNAL MEDICINE

## 2023-11-07 PROCEDURE — 83036 HEMOGLOBIN GLYCOSYLATED A1C: CPT | Performed by: INTERNAL MEDICINE
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
## 2023-11-07 NOTE — ASSESSMENT & PLAN NOTE
Lab Results   Component Value Date    HGBA1C 6.2 11/07/2023   Diabetes appears to be well controlled-discussed increasing Ozempic to aid with weight loss and he is agreeable-increase Ozempic to 0.5 mg weekly, continue Jardiance.   He is due for labs-advised to have done

## 2023-11-15 ENCOUNTER — FOLLOW UP (OUTPATIENT)
Dept: URBAN - METROPOLITAN AREA CLINIC 6 | Facility: CLINIC | Age: 58
End: 2023-11-15

## 2023-11-15 DIAGNOSIS — H40.013: ICD-10-CM

## 2023-11-15 DIAGNOSIS — H26.492: ICD-10-CM

## 2023-11-15 DIAGNOSIS — E11.3393: ICD-10-CM

## 2023-11-15 LAB
LEFT EYE DIABETIC RETINOPATHY: POSITIVE
RIGHT EYE DIABETIC RETINOPATHY: POSITIVE

## 2023-11-15 PROCEDURE — 92014 COMPRE OPH EXAM EST PT 1/>: CPT

## 2023-11-15 PROCEDURE — 92134 CPTRZ OPH DX IMG PST SGM RTA: CPT

## 2023-11-15 ASSESSMENT — VISUAL ACUITY
OS_SC: 20/25
OD_SC: 20/25-1

## 2023-11-15 ASSESSMENT — TONOMETRY
OD_IOP_MMHG: 20
OS_IOP_MMHG: 18

## 2023-11-22 DIAGNOSIS — M79.605 LEFT LEG PAIN: ICD-10-CM

## 2023-11-22 RX ORDER — TRAMADOL HYDROCHLORIDE 50 MG/1
TABLET ORAL
Qty: 60 TABLET | Refills: 0 | Status: SHIPPED | OUTPATIENT
Start: 2023-11-22

## 2023-11-28 ENCOUNTER — APPOINTMENT (INPATIENT)
Dept: NON INVASIVE DIAGNOSTICS | Facility: HOSPITAL | Age: 58
DRG: 623 | End: 2023-11-28
Payer: MEDICARE

## 2023-11-28 ENCOUNTER — APPOINTMENT (EMERGENCY)
Dept: RADIOLOGY | Facility: HOSPITAL | Age: 58
DRG: 623 | End: 2023-11-28
Payer: MEDICARE

## 2023-11-28 ENCOUNTER — HOSPITAL ENCOUNTER (INPATIENT)
Facility: HOSPITAL | Age: 58
LOS: 2 days | Discharge: HOME | DRG: 623 | End: 2023-11-30
Attending: EMERGENCY MEDICINE | Admitting: STUDENT IN AN ORGANIZED HEALTH CARE EDUCATION/TRAINING PROGRAM
Payer: MEDICARE

## 2023-11-28 DIAGNOSIS — S91.331A PENETRATING WOUND OF RIGHT FOOT, INITIAL ENCOUNTER: Primary | ICD-10-CM

## 2023-11-28 DIAGNOSIS — L03.115 CELLULITIS OF RIGHT FOOT: ICD-10-CM

## 2023-11-28 LAB
ANION GAP SERPL CALCULATED.3IONS-SCNC: 10 MMOL/L
BASOPHILS # BLD AUTO: 0.05 THOUSANDS/ÂΜL (ref 0–0.1)
BASOPHILS NFR BLD AUTO: 1 % (ref 0–1)
BILIRUB UR QL STRIP: NEGATIVE
BUN SERPL-MCNC: 32 MG/DL (ref 5–25)
CALCIUM SERPL-MCNC: 9.4 MG/DL (ref 8.4–10.2)
CHLORIDE SERPL-SCNC: 99 MMOL/L (ref 96–108)
CLARITY UR: CLEAR
CO2 SERPL-SCNC: 23 MMOL/L (ref 21–32)
COLOR UR: YELLOW
CREAT SERPL-MCNC: 1.35 MG/DL (ref 0.6–1.3)
EOSINOPHIL # BLD AUTO: 0.21 THOUSAND/ÂΜL (ref 0–0.61)
EOSINOPHIL NFR BLD AUTO: 4 % (ref 0–6)
ERYTHROCYTE [DISTWIDTH] IN BLOOD BY AUTOMATED COUNT: 13.6 % (ref 11.6–15.1)
EST. AVERAGE GLUCOSE BLD GHB EST-MCNC: 146 MG/DL
GFR SERPL CREATININE-BSD FRML MDRD: 56 ML/MIN/1.73SQ M
GLUCOSE SERPL-MCNC: 138 MG/DL (ref 65–140)
GLUCOSE SERPL-MCNC: 182 MG/DL (ref 65–140)
GLUCOSE UR STRIP-MCNC: ABNORMAL MG/DL
HBA1C MFR BLD: 6.7 %
HCT VFR BLD AUTO: 38.9 % (ref 36.5–49.3)
HGB BLD-MCNC: 13 G/DL (ref 12–17)
HGB UR QL STRIP.AUTO: NEGATIVE
IMM GRANULOCYTES # BLD AUTO: 0.02 THOUSAND/UL (ref 0–0.2)
IMM GRANULOCYTES NFR BLD AUTO: 0 % (ref 0–2)
KETONES UR STRIP-MCNC: NEGATIVE MG/DL
LACTATE SERPL-SCNC: 1.4 MMOL/L (ref 0.5–2)
LACTATE SERPL-SCNC: 2.1 MMOL/L (ref 0.5–2)
LEUKOCYTE ESTERASE UR QL STRIP: NEGATIVE
LYMPHOCYTES # BLD AUTO: 1.31 THOUSANDS/ÂΜL (ref 0.6–4.47)
LYMPHOCYTES NFR BLD AUTO: 22 % (ref 14–44)
MCH RBC QN AUTO: 30 PG (ref 26.8–34.3)
MCHC RBC AUTO-ENTMCNC: 33.4 G/DL (ref 31.4–37.4)
MCV RBC AUTO: 90 FL (ref 82–98)
MONOCYTES # BLD AUTO: 0.58 THOUSAND/ÂΜL (ref 0.17–1.22)
MONOCYTES NFR BLD AUTO: 10 % (ref 4–12)
NEUTROPHILS # BLD AUTO: 3.67 THOUSANDS/ÂΜL (ref 1.85–7.62)
NEUTS SEG NFR BLD AUTO: 63 % (ref 43–75)
NITRITE UR QL STRIP: NEGATIVE
NRBC BLD AUTO-RTO: 0 /100 WBCS
PH UR STRIP.AUTO: 5.5 [PH] (ref 4.5–8)
PLATELET # BLD AUTO: 202 THOUSANDS/UL (ref 149–390)
PLATELET # BLD AUTO: 233 THOUSANDS/UL (ref 149–390)
PMV BLD AUTO: 9.5 FL (ref 8.9–12.7)
PMV BLD AUTO: 9.6 FL (ref 8.9–12.7)
POTASSIUM SERPL-SCNC: 4.7 MMOL/L (ref 3.5–5.3)
PROT UR STRIP-MCNC: NEGATIVE MG/DL
RBC # BLD AUTO: 4.33 MILLION/UL (ref 3.88–5.62)
SODIUM SERPL-SCNC: 132 MMOL/L (ref 135–147)
SP GR UR STRIP.AUTO: 1.01 (ref 1–1.03)
UROBILINOGEN UR QL STRIP.AUTO: 0.2 E.U./DL
WBC # BLD AUTO: 5.84 THOUSAND/UL (ref 4.31–10.16)

## 2023-11-28 PROCEDURE — 99284 EMERGENCY DEPT VISIT MOD MDM: CPT

## 2023-11-28 PROCEDURE — 85049 AUTOMATED PLATELET COUNT: CPT

## 2023-11-28 PROCEDURE — 83605 ASSAY OF LACTIC ACID: CPT | Performed by: PHYSICIAN ASSISTANT

## 2023-11-28 PROCEDURE — 82948 REAGENT STRIP/BLOOD GLUCOSE: CPT

## 2023-11-28 PROCEDURE — 93922 UPR/L XTREMITY ART 2 LEVELS: CPT | Performed by: SURGERY

## 2023-11-28 PROCEDURE — 81003 URINALYSIS AUTO W/O SCOPE: CPT

## 2023-11-28 PROCEDURE — 0KBV0ZZ EXCISION OF RIGHT FOOT MUSCLE, OPEN APPROACH: ICD-10-PCS | Performed by: STUDENT IN AN ORGANIZED HEALTH CARE EDUCATION/TRAINING PROGRAM

## 2023-11-28 PROCEDURE — 85025 COMPLETE CBC W/AUTO DIFF WBC: CPT | Performed by: PHYSICIAN ASSISTANT

## 2023-11-28 PROCEDURE — 93925 LOWER EXTREMITY STUDY: CPT

## 2023-11-28 PROCEDURE — 93923 UPR/LXTR ART STDY 3+ LVLS: CPT

## 2023-11-28 PROCEDURE — 99285 EMERGENCY DEPT VISIT HI MDM: CPT | Performed by: PHYSICIAN ASSISTANT

## 2023-11-28 PROCEDURE — 36415 COLL VENOUS BLD VENIPUNCTURE: CPT | Performed by: PHYSICIAN ASSISTANT

## 2023-11-28 PROCEDURE — 80048 BASIC METABOLIC PNL TOTAL CA: CPT | Performed by: PHYSICIAN ASSISTANT

## 2023-11-28 PROCEDURE — 87040 BLOOD CULTURE FOR BACTERIA: CPT | Performed by: PHYSICIAN ASSISTANT

## 2023-11-28 PROCEDURE — 93925 LOWER EXTREMITY STUDY: CPT | Performed by: SURGERY

## 2023-11-28 PROCEDURE — 73630 X-RAY EXAM OF FOOT: CPT

## 2023-11-28 PROCEDURE — 83036 HEMOGLOBIN GLYCOSYLATED A1C: CPT

## 2023-11-28 RX ORDER — ASCORBIC ACID 500 MG
1000 TABLET ORAL 2 TIMES DAILY
Status: DISCONTINUED | OUTPATIENT
Start: 2023-11-28 | End: 2023-11-30 | Stop reason: HOSPADM

## 2023-11-28 RX ORDER — TRAMADOL HYDROCHLORIDE 50 MG/1
100 TABLET ORAL
Status: DISCONTINUED | OUTPATIENT
Start: 2023-11-28 | End: 2023-11-30 | Stop reason: HOSPADM

## 2023-11-28 RX ORDER — ACETAMINOPHEN 325 MG/1
650 TABLET ORAL EVERY 4 HOURS PRN
Status: DISCONTINUED | OUTPATIENT
Start: 2023-11-28 | End: 2023-11-30 | Stop reason: HOSPADM

## 2023-11-28 RX ORDER — LISINOPRIL 20 MG/1
20 TABLET ORAL 2 TIMES DAILY
Status: DISCONTINUED | OUTPATIENT
Start: 2023-11-28 | End: 2023-11-30 | Stop reason: HOSPADM

## 2023-11-28 RX ORDER — ACETAMINOPHEN 325 MG/1
975 TABLET ORAL ONCE AS NEEDED
Status: COMPLETED | OUTPATIENT
Start: 2023-11-28 | End: 2023-11-28

## 2023-11-28 RX ORDER — ENOXAPARIN SODIUM 100 MG/ML
40 INJECTION SUBCUTANEOUS DAILY
Status: DISCONTINUED | OUTPATIENT
Start: 2023-11-28 | End: 2023-11-30 | Stop reason: HOSPADM

## 2023-11-28 RX ORDER — HYDROCHLOROTHIAZIDE 12.5 MG/1
12.5 TABLET ORAL 2 TIMES DAILY
Status: DISCONTINUED | OUTPATIENT
Start: 2023-11-28 | End: 2023-11-30 | Stop reason: HOSPADM

## 2023-11-28 RX ORDER — OXYCODONE HYDROCHLORIDE 5 MG/1
5 TABLET ORAL EVERY 6 HOURS PRN
Status: DISCONTINUED | OUTPATIENT
Start: 2023-11-28 | End: 2023-11-29

## 2023-11-28 RX ORDER — CARVEDILOL 25 MG/1
25 TABLET ORAL 2 TIMES DAILY WITH MEALS
Status: DISCONTINUED | OUTPATIENT
Start: 2023-11-28 | End: 2023-11-30 | Stop reason: HOSPADM

## 2023-11-28 RX ADMIN — ACETAMINOPHEN 325MG 650 MG: 325 TABLET ORAL at 21:01

## 2023-11-28 RX ADMIN — ACETAMINOPHEN 325MG 975 MG: 325 TABLET ORAL at 09:05

## 2023-11-28 RX ADMIN — LISINOPRIL 20 MG: 20 TABLET ORAL at 21:01

## 2023-11-28 RX ADMIN — SODIUM CHLORIDE 1000 ML: 0.9 INJECTION, SOLUTION INTRAVENOUS at 10:04

## 2023-11-28 RX ADMIN — OXYCODONE HYDROCHLORIDE AND ACETAMINOPHEN 1000 MG: 500 TABLET ORAL at 21:01

## 2023-11-28 RX ADMIN — OXYCODONE HYDROCHLORIDE 5 MG: 5 TABLET ORAL at 19:20

## 2023-11-28 RX ADMIN — PIPERACILLIN SODIUM AND TAZOBACTAM SODIUM 3.38 G: 36; 4.5 INJECTION, POWDER, LYOPHILIZED, FOR SOLUTION INTRAVENOUS at 10:07

## 2023-11-28 RX ADMIN — HYDROCHLOROTHIAZIDE 12.5 MG: 12.5 TABLET ORAL at 21:01

## 2023-11-28 RX ADMIN — TRAMADOL HYDROCHLORIDE 100 MG: 50 TABLET, COATED ORAL at 21:01

## 2023-11-28 RX ADMIN — PIPERACILLIN SODIUM AND TAZOBACTAM SODIUM 4.5 G: 36; 4.5 INJECTION, POWDER, LYOPHILIZED, FOR SOLUTION INTRAVENOUS at 18:23

## 2023-11-28 RX ADMIN — CARVEDILOL 25 MG: 25 TABLET, FILM COATED ORAL at 16:26

## 2023-11-28 RX ADMIN — OXYCODONE HYDROCHLORIDE 5 MG: 5 TABLET ORAL at 12:18

## 2023-11-28 NOTE — ED PROVIDER NOTES
History  Chief Complaint   Patient presents with    Foot Pain     Pt reports stepping on a nail in his right foot "sometime in the beginning of last week". Pt complains of right foot pain and believes last tetanus was last year. Yellowing around the site along with swelling. Pt states drainage has been present in clear/yellow//blood. Pt resents emergency department after he reports he stepped on a nail about a week or week and a half ago at home slippers. Patient is diabetic and states he had a wound to his whole foot and states pinky toe. He is concerned that the wound is getting worse and so he came in for evaluation. He states it is sore and he has peripheral neuropathy and always has some burning to his feet. Patient thinks he is up-to-date on his tetanus. Chart reviewed tdap 2/2022  Denies any fevers or chills. Prior to Admission Medications   Prescriptions Last Dose Informant Patient Reported? Taking?    Accu-Chek FastClix Lancets MISC  Self No Yes   Sig: Use to test blood sugar once a day   Alpha-Lipoic Acid 600 MG CAPS  Self Yes Yes   Sig: Take 600 mg by mouth 2 (two) times a day     Apple Cider Vinegar 300 MG TABS  Self Yes Yes   Sig: Take 300 mg by mouth daily     Ascorbic Acid (vitamin C) 1000 MG tablet  Self Yes Yes   Sig: Take 1,000 mg by mouth 2 (two) times a day 2 tablet twice a day   BENFOTIAMINE PO  Self Yes Yes   Blood Glucose Monitoring Suppl (Accu-Chek Guide Me) w/Device KIT  Self No Yes   Sig: Use to check blood sugar once a day   Chromium Picolinate 200 MCG TABS  Self Yes Yes   Sig: Take by mouth   Empagliflozin (Jardiance) 25 MG TABS   No Yes   Sig: Take 1 tablet (25 mg total) by mouth daily   Garlic 4634 MG CAPS  Self Yes Yes   Sig: Take by mouth 2 (two) times a day    IRON, FERROUS SULFATE, PO  Self Yes Yes   Sig: Take by mouth   Lutein-Bilberry (LUTEIN PLUS BILBERRY PO)  Self Yes Yes   Sig: Take by mouth   Pyridoxine HCl (B-6 PO)  Self Yes Yes   Sig: Take by mouth Turmeric (QC TUMERIC COMPLEX PO)  Self Yes Yes   Sig: Take 1,000 mg by mouth once Tumeric /curcimin   VITAMIN D PO  Self Yes Yes   Sig: Take by mouth 2 (two) times a day   Zinc 50 MG CAPS  Self Yes Yes   Sig: Take by mouth 2 (two) times a day    beta carotene 30 MG capsule  Self Yes Yes   Sig: Take 30 mg by mouth 2 (two) times a day     carvedilol (COREG) 25 mg tablet  Self No Yes   Sig: Take 1 tablet (25 mg total) by mouth 2 (two) times a day with meals   glucose blood (Accu-Chek Guide) test strip  Self No Yes   Sig: Use to check blood sugar once a day   ketorolac (ACULAR) 0.5 % ophthalmic solution  Self Yes Yes   lidocaine (Lidoderm) 5 %  Self No Yes   Sig: Apply 1 patch topically daily Remove & Discard patch within 12 hours or as directed by MD   lisinopril-hydrochlorothiazide (PRINZIDE,ZESTORETIC) 20-12.5 MG per tablet  Self No Yes   Sig: Take 1 tablet by mouth 2 (two) times a day   semaglutide, 0.25 or 0.5 mg/dose, (Ozempic, 0.25 or 0.5 MG/DOSE,) 2 mg/1.5 mL injection pen   No Yes   Si.5 mg weekly   Patient taking differently: Inject 0.5 mg under the skin every 7 days 0.5 mg weekly   traMADol (Ultram) 50 mg tablet   No Yes   Si tablets in evening as needed for severe pain relief   vitamin B-12 (CYANOCOBALAMIN) 100 MCG TABS  Self Yes Yes   Sig: Take 50 mcg by mouth daily   vitamin E, tocopherol, 400 units capsule  Self Yes Yes   Sig: Take 400 Units by mouth 2 (two) times a day      Facility-Administered Medications: None       Past Medical History:   Diagnosis Date    Chronic pain disorder     Diabetes mellitus ([address])     H/O eye surgery     High blood sugar     Hypertension     Liver disease        Past Surgical History:   Procedure Laterality Date    HERNIA REPAIR      KIDNEY SURGERY      MOUTH SURGERY      SD AMPUTATION METATARSAL W/TOE SINGLE Left 2022    Procedure: RAY RESECTION FOOT;  Surgeon: Robert Fernandez DPM;  Location[facility];  Service: Podiatry    SD RPR AA HERNIA 1ST < 3 CM REDUCIBLE N/A 7/14/2023    Procedure: REPAIR HERNIA INCISIONAL;  Surgeon: Melanie Bowling MD;  [facility];  Service: General    WOUND DEBRIDEMENT Left 4/28/2022    Procedure: Left foot washout;  Surgeon: Farrukh Johns DPM;  Location[facility];  Service: Podiatry    WOUND DEBRIDEMENT Left 6/6/2022    Procedure: DEBRIDEMENT WOUND [facility] OUT); Surgeon: Farrukh Johns DPM;  Location: [facility];  Service: Podiatry       History reviewed. No pertinent family history. I have reviewed and agree with the history as documented. E-Cigarette/Vaping    E-Cigarette Use Never User      E-Cigarette/Vaping Substances    Nicotine No     THC No     CBD No     Flavoring No     Other No     Unknown No      Social History     Tobacco Use    Smoking status: Never    Smokeless tobacco: Never   Vaping Use    Vaping Use: Never used   Substance Use Topics    Alcohol use: Yes     Comment: ocassional    Drug use: Never       Review of Systems   Constitutional:  Negative for fever. Respiratory: Negative. Cardiovascular: Negative. Gastrointestinal: Negative. Skin:  Positive for wound. All other systems reviewed and are negative. Physical Exam  Physical Exam  Vitals and nursing note reviewed. Constitutional:       Appearance: He is well-developed. He is obese. HENT:      Head: Normocephalic and atraumatic. Right Ear: Ear canal and external ear normal.      Left Ear: Ear canal and external ear normal.   Eyes:      Conjunctiva/sclera: Conjunctivae normal.   Cardiovascular:      Rate and Rhythm: Normal rate and regular rhythm. Heart sounds: Normal heart sounds. Pulmonary:      Effort: Pulmonary effort is normal.      Breath sounds: Normal breath sounds. Abdominal:      General: Bowel sounds are normal.      Palpations: Abdomen is soft. Musculoskeletal:         General: Normal range of motion. Cervical back: Normal range of motion and neck supple.         Feet:    Lymphadenopathy: Cervical: No cervical adenopathy. Skin:     General: Skin is warm. Neurological:      Mental Status: He is alert and oriented to person, place, and time. Motor: No abnormal muscle tone.       Coordination: Coordination normal.   Psychiatric:         Mood and Affect: Mood normal.         Behavior: Behavior normal.               Vital Signs  ED Triage Vitals [11/28/23 0823]   Temperature Pulse Respirations Blood Pressure SpO2   98.2 °F (36.8 °C) 88 20 150/87 96 %      Temp Source Heart Rate Source Patient Position - Orthostatic VS BP Location FiO2 (%)   Oral Monitor Sitting Right arm --      Pain Score       8           Vitals:    11/28/23 0823 11/28/23 1100 11/28/23 1356 11/28/23 1454   BP: 150/87 149/90 156/93 163/93   Pulse: 88 82 71 75   Patient Position - Orthostatic VS: Sitting Sitting Lying          Visual Acuity      ED Medications  Medications   enoxaparin (LOVENOX) subcutaneous injection 40 mg (40 mg Subcutaneous Not Given 11/28/23 1059)   acetaminophen (TYLENOL) tablet 650 mg (has no administration in time range)   ascorbic acid (VITAMIN C) tablet 1,000 mg (1,000 mg Oral Not Given 11/28/23 1157)   carvedilol (COREG) tablet 25 mg (has no administration in time range)   Empagliflozin (JARDIANCE) tablet 20 mg (20 mg Oral Not Given 11/28/23 1243)   lisinopril (ZESTRIL) tablet 20 mg (20 mg Oral Not Given 11/28/23 1159)     And   hydrochlorothiazide (HYDRODIURIL) tablet 12.5 mg (12.5 mg Oral Not Given 11/28/23 1159)   semaglutide (0.25 or 0.5 mg/dose) (Ozempic) injection pen 0.5 mg (0.5 mg Subcutaneous Not Given 11/28/23 1244)   traMADol (ULTRAM) tablet 100 mg (has no administration in time range)   piperacillin-tazobactam (ZOSYN) 4.5 g in sodium chloride 0.9 % 100 mL IVPB (has no administration in time range)   oxyCODONE (ROXICODONE) IR tablet 5 mg (5 mg Oral Given 11/28/23 1218)   acetaminophen (TYLENOL) tablet 975 mg (975 mg Oral Given 11/28/23 0905)   piperacillin-tazobactam (ZOSYN) IVPB 3.375 g (0 g Intravenous Stopped 11/28/23 1056)   sodium chloride 0.9 % bolus 1,000 mL (0 mL Intravenous Stopped 11/28/23 1155)       Diagnostic Studies  Results Reviewed       Procedure Component Value Units Date/Time    Blood culture #2 [[id number]] Collected: 11/28/23 0851    Lab Status: Preliminary result Specimen: Blood from Arm, Right Updated: 11/28/23 1401     Blood Culture Received in Microbiology Lab. Culture in Progress. Blood culture #1 [[id number]] Collected: 11/28/23 0858    Lab Status: Preliminary result Specimen: Blood from Arm, Left Updated: 11/28/23 1401     Blood Culture Received in Microbiology Lab. Culture in Progress. Urine Macroscopic, POC [[id number]]  (Abnormal) Collected: 11/28/23 1220    Lab Status: Final result Specimen: Urine Updated: 11/28/23 1221     Color, UA Yellow     Clarity, UA Clear     pH, UA 5.5     Leukocytes, UA Negative     Nitrite, UA Negative     Protein, UA Negative mg/dl      Glucose,  (1/2%) mg/dl      Ketones, UA Negative mg/dl      Urobilinogen, UA 0.2 E.U./dl      Bilirubin, UA Negative     Occult Blood, UA Negative     Specific Gravity, UA 1.015    Narrative:      CLINITEK RESULT    Lactic acid 2 Hours [[id number]]  (Normal) Collected: 11/28/23 1058    Lab Status: Final result Specimen: Blood from Arm, Right Updated: 11/28/23 1122     LACTIC ACID 1.4 mmol/L     Narrative:      Result may be elevated if tourniquet was used during collection. Platelet count [[id number]]  (Normal) Collected: 11/28/23 1058    Lab Status: Final result Specimen: Blood from Arm, Right Updated: 11/28/23 1108     Platelets 258 Thousands/uL      MPV 9.6 fL     Fingerstick Glucose (POCT) [[id number]]  (Normal) Collected: 11/28/23 1030    Lab Status: Final result Updated: 11/28/23 1032     POC Glucose 138 mg/dl     Hemoglobin A1C [[id number]] Collected: 11/28/23 0851    Lab Status:  In process Specimen: Blood from Arm, Right Updated: 11/28/23 1017    Lactic acid, plasma (w/reflex if result > 2.0) [[id number]]  (Abnormal) Collected: 11/28/23 0851    Lab Status: Final result Specimen: Blood from Arm, Right Updated: 11/28/23 0937     LACTIC ACID 2.1 mmol/L     Narrative:      Result may be elevated if tourniquet was used during collection.     Basic metabolic panel [[id number]]  (Abnormal) Collected: 11/28/23 0851    Lab Status: Final result Specimen: Blood from Arm, Right Updated: 11/28/23 0932     Sodium 132 mmol/L      Potassium 4.7 mmol/L      Chloride 99 mmol/L      CO2 23 mmol/L      ANION GAP 10 mmol/L      BUN 32 mg/dL      Creatinine 1.35 mg/dL      Glucose 182 mg/dL      Calcium 9.4 mg/dL      eGFR 56 ml/min/1.73sq m     Narrative:      [place] guidelines for Chronic Kidney Disease (CKD):     Stage 1 with normal or high GFR (GFR > 90 mL/min/1.73 square meters)    Stage 2 Mild CKD (GFR = 60-89 mL/min/1.73 square meters)    Stage 3A Moderate CKD (GFR = 45-59 mL/min/1.73 square meters)    Stage 3B Moderate CKD (GFR = 30-44 mL/min/1.73 square meters)    Stage 4 Severe CKD (GFR = 15-29 mL/min/1.73 square meters)    Stage 5 End Stage CKD (GFR <15 mL/min/1.73 square meters)  Note: GFR calculation is accurate only with a steady state creatinine    CBC and differential [[id number]] Collected: 11/28/23 0851    Lab Status: Final result Specimen: Blood from Arm, Right Updated: 11/28/23 0909     WBC 5.84 Thousand/uL      RBC 4.33 Million/uL      Hemoglobin 13.0 g/dL      Hematocrit 38.9 %      MCV 90 fL      MCH 30.0 pg      MCHC 33.4 g/dL      RDW 13.6 %      MPV 9.5 fL      Platelets 263 Thousands/uL      nRBC 0 /100 WBCs      Neutrophils Relative 63 %      Immat GRANS % 0 %      Lymphocytes Relative 22 %      Monocytes Relative 10 %      Eosinophils Relative 4 %      Basophils Relative 1 %      Neutrophils Absolute 3.67 Thousands/µL      Immature Grans Absolute 0.02 Thousand/uL      Lymphocytes Absolute 1.31 Thousands/µL      Monocytes Absolute 0.58 Thousand/µL Eosinophils Absolute 0.21 Thousand/µL      Basophils Absolute 0.05 Thousands/µL                    XR foot 3+ views RIGHT   ED Interpretation by Sonny Murguia PA-C (11/28 0895)   No sign fx or fb       Final Result by Lenore Boas, MD (11/28 1016)   No radiopaque foreign body      No acute osseous abnormality. Workstation performed: [id number]         VAS lower limb arterial duplex, complete bilateral    (Results Pending)              Procedures  Procedures         ED Course  ED Course as of 11/28/23 1551   Tue Nov 28, 2023   0913 WBC: 5.84  normal   0916 Discussed with podiatyr Armanda Boxer    8682 LACTIC ACID(!!): 2.1  Elivated    2743 Creatinine(!): 1.35  Ckd - improved from baseline    0943 Discussed with DR Lindsay Vides and discussed abx with Selina Agee - pharmacist - disused Zosyn and dose   0943 Glucose, Random(!): 182  Elevated - pt diabetic - not fasting    0957 Podiatry seeing pt    1000 To admit to podiatry -                             Initial Sepsis Screening       [address] Name 11/28/23 1003                Is the patient's history suggestive of a new or worsening infection? Yes (Proceed)  -AH        Suspected source of infection wound infection  -AH        Indicate SIRS criteria --        Are two or more of the above signs & symptoms of infection both present and new to the patient? No  -AH        Assess for evidence of organ dysfunction: Are any of the below criteria present within 6 hours of suspected infection and SIRS criteria that are NOT considered to be chronic conditions? --                  User Key  (r) = Recorded By, (t) = Taken By, (c) = Cosigned By      [address] [place] [address] Name Provider Type     Jaylyn Gates PA-C Physician Assistant                                  Medical Decision Making  Discussed with podiatry  Will check labs - including sugars  Wbc and lactate and blood cultures for sepsis     Amount and/or Complexity of Data Reviewed  External Data Reviewed: notes.      Details: prior diabetic wound   recent visits. Labs: ordered. Decision-making details documented in ED Course. Radiology: ordered and independent interpretation performed. Risk  OTC drugs. Decision regarding hospitalization. Disposition  Final diagnoses:   Penetrating wound of right foot, initial encounter     Time reflects when diagnosis was documented in both MDM as applicable and the Disposition within this note       Time User Action Codes Description Comment    11/28/2023  8:44 AM Jaylyn Cabrera [T91.195H] Penetrating wound of right foot, initial encounter           ED Disposition       ED Disposition   Admit    Condition   Stable    Date/Time   Tue Nov 28, 2023 10:01 AM    Comment   Case was discussed with Brea Gilliland and the patient's admission status was agreed to be Admission Status: inpatient status to the service of Dr. Jed Snell  .                Follow-up Information       Follow up With Specialties Details Why Contact Info    Gilmar Anderson DPM Podiatry Follow up in 1 week(s)  [address] [address] [phone]              Current Discharge Medication List        CONTINUE these medications which have NOT CHANGED    Details   Accu-Chek FastClix Lancets MISC Use to test blood sugar once a day  Qty: 102 each, Refills: 0    Associated Diagnoses: Type 2 diabetes mellitus with other specified complication, without long-term current use of insulin (HCC)      Alpha-Lipoic Acid 600 MG CAPS Take 600 mg by mouth 2 (two) times a day        Apple Cider Vinegar 300 MG TABS Take 300 mg by mouth daily        Ascorbic Acid (vitamin C) 1000 MG tablet Take 1,000 mg by mouth 2 (two) times a day 2 tablet twice a day      BENFOTIAMINE PO       beta carotene 30 MG capsule Take 30 mg by mouth 2 (two) times a day        Blood Glucose Monitoring Suppl (Accu-Chek Guide Me) w/Device KIT Use to check blood sugar once a day  Qty: 1 kit, Refills: 0    Associated Diagnoses: Type 2 diabetes mellitus with other specified complication, without long-term current use of insulin ([facility])      carvedilol (COREG) 25 mg tablet Take 1 tablet (25 mg total) by mouth 2 (two) times a day with meals  Qty: 180 tablet, Refills: 3    Associated Diagnoses: Essential hypertension      Chromium Picolinate 200 MCG TABS Take by mouth      Empagliflozin (Jardiance) 25 MG TABS Take 1 tablet (25 mg total) by mouth daily  Qty: 90 tablet, Refills: 3    Associated Diagnoses: Type 2 diabetes mellitus with other specified complication, without long-term current use of insulin ([facility])      Garlic 9513 MG CAPS Take by mouth 2 (two) times a day       glucose blood (Accu-Chek Guide) test strip Use to check blood sugar once a day  Qty: 100 strip, Refills: 0    Associated Diagnoses: Type 2 diabetes mellitus with other specified complication, without long-term current use of insulin ([facility])      IRON, FERROUS SULFATE, PO Take by mouth      ketorolac (ACULAR) 0.5 % ophthalmic solution       lidocaine (Lidoderm) 5 % Apply 1 patch topically daily Remove & Discard patch within 12 hours or as directed by MD  Qty: 10 patch, Refills: 0    Associated Diagnoses: Low back pain with sciatica, sciatica laterality unspecified, unspecified back pain laterality, unspecified chronicity      lisinopril-hydrochlorothiazide (PRINZIDE,ZESTORETIC) 20-12.5 MG per tablet Take 1 tablet by mouth 2 (two) times a day  Qty: 180 tablet, Refills: 3    Associated Diagnoses: Essential hypertension      Lutein-Bilberry (LUTEIN PLUS BILBERRY PO) Take by mouth      Pyridoxine HCl (B-6 PO) Take by mouth      semaglutide, 0.25 or 0.5 mg/dose, (Ozempic, 0.25 or 0.5 MG/DOSE,) 2 mg/1.5 mL injection pen 0.5 mg weekly  Qty: 1.5 mL, Refills: 3    Associated Diagnoses: Type 2 diabetes mellitus with other specified complication, without long-term current use of insulin ([facility])      traMADol (Ultram) 50 mg tablet 2 tablets in evening as needed for severe pain relief  Qty: 60 tablet, Refills: 0    Associated Diagnoses: Left leg pain      Turmeric (QC TUMERIC COMPLEX PO) Take 1,000 mg by mouth once Tumeric /curcimin      vitamin B-12 (CYANOCOBALAMIN) 100 MCG TABS Take 50 mcg by mouth daily      VITAMIN D PO Take by mouth 2 (two) times a day      vitamin E, tocopherol, 400 units capsule Take 400 Units by mouth 2 (two) times a day      Zinc 50 MG CAPS Take by mouth 2 (two) times a day              No discharge procedures on file.     PDMP Review         Value Time User    PDMP Reviewed  Yes 11/22/2023 11:24  Luis Mims DO            ED Provider  Electronically Signed by             Julio Almaraz PA-C  11/28/23 7531

## 2023-11-28 NOTE — CONSULTS
Podiatry - Consultation    Patient Information:   Frederick Page 59 y.o. male MRN: [id number]  Unit/Bed#: ED-10 Encounter: [id number]  PCP: Amrit Fischer MD  Date of Admission:  11/28/2023  Date of Consultation: 11/28/23  Requesting Physician: Ankit Metzger, *      ASSESSMENT:    Frederick Page is a 59 y.o. male with:    Right foot wound secondary to nail puncture    PLAN:    Right foot wound debrided at bedside, see procedure note. Right foot XRs reviewed, per my personal read no evidence of osteomyelitis, no foreign bodies, noted calcified arteries throughout foot. Will continue to monitor labs/vitals. Local wound care consisting of betadine/DSD. Wound care instructions placed. Elevation on green foam wedges or pillows when non-ambulatory  Rest of care per primary team.  Will discuss this plan with my attending and update as needed. Weightbearing status: as tolerated in surgical shoe    Debridement Procedure:  After  Verbal Consent was obtained and time out performed. Wound located right foot was  excisionally Surgical- fat / tendon / muscle (CPT: 77418) debrided using scapel. Pre-debridement wound measures 0.5 cm x 0.5 cm x 0.1 cm. Pain was controlled by Lack of sensation due to Neuropathy . Post debridement measurement 1.5 cm x 1 cm x 0.5 cm for a total of 15 square centimeters, with wound appearing Fresh bleeding tissue, viable, and granular. 100%  of wound debrided. Tissue debrided includes depth of Fascia with devitalized tissue being debrided including Hyperkeratosis and Fibrous. with a small amount of bleeding bleeding was noted during procedure. Hemostasis was achieved using pressure. Pain noted during procedure rated as 1. Pain noted after procedure rated as 1. Procedure was Well tolerated by patient. SUBJECTIVE:    History of Present Illness:    Frederick Page is a 59 y.o. male who is originally admitted 11/28/2023 due to right foot wound.  Patient has a past medical history of T2DM with neuropathy, CKD, sciatica, opioid dependence. We are consulted for Right foot wound. Patient reports stepping on a nail in his right foot sometime in the beginning of last week. He states that he was wearing slippers at home at the time he stepped on the nail. Patient admits to neuropathy however complains of recent right foot pain and believes his last tetanus was last year. He reports discoloration of right foot with swelling and drainage that reports has been clear/yellow/bloody. Patient denies any fever or chills. Review of Systems:    Constitutional: Negative. HENT: Negative. Eyes: Negative. Respiratory: Negative. Cardiovascular: Negative. Gastrointestinal: Negative. Musculoskeletal: right foot pain  Skin: right foot wound  Neurological: Diminished sensation bilateral feet  Psych: Negative. Past Medical and Surgical History:     Past Medical History:   Diagnosis Date    Chronic pain disorder     Diabetes mellitus ([address])     H/O eye surgery     High blood sugar     Hypertension     Liver disease        Past Surgical History:   Procedure Laterality Date    HERNIA REPAIR      KIDNEY SURGERY      MOUTH SURGERY      MO AMPUTATION METATARSAL W/TOE SINGLE Left 6/1/2022    Procedure: RAY RESECTION FOOT;  Surgeon: Chuckie Manzo DPM;  [facility];  Service: Podiatry    MO RPR AA HERNIA 1ST < 3 CM REDUCIBLE N/A 7/14/2023    Procedure: REPAIR HERNIA INCISIONAL;  Surgeon: Anh Freire MD;  [facility];  Service: General    WOUND DEBRIDEMENT Left 4/28/2022    Procedure: Left foot washout;  Surgeon: Chuckie Manzo DPM;  [facility];  Service: Podiatry    WOUND DEBRIDEMENT Left 6/6/2022    Procedure: DEBRIDEMENT WOUND [facility] OUT);   Surgeon: Chuckie Manzo DPM;  [facility];  Service: Podiatry       Meds/Allergies:    (Not in a hospital admission)      Allergies   Allergen Reactions    Cephalexin Hives     Skin peeling  Tolerates penicillins (tolerated unasyn and zosyn)    Metformin GI Intolerance       Social History:     Marital Status: Registered Domestic Partner    Substance Use History:   Social History     Substance and Sexual Activity   Alcohol Use Yes    Comment: ocassional     Social History     Tobacco Use   Smoking Status Never   Smokeless Tobacco Never     Social History     Substance and Sexual Activity   Drug Use Never       Family History:    History reviewed. No pertinent family history. OBJECTIVE:    Vitals:   Blood Pressure: 150/87 (11/28/23 0823)  Pulse: 88 (11/28/23 0823)  Temperature: 98.2 °F (36.8 °C) (11/28/23 0823)  Temp Source: Oral (11/28/23 0823)  Respirations: 20 (11/28/23 0823)  Weight - Scale: 123 kg (270 lb 8.1 oz) (11/28/23 0823)  SpO2: 96 % (11/28/23 0823)    Physical Exam:    General Appearance: Alert, cooperative, no distress. HEENT: Head normocephalic, atraumatic, without obvious abnormality. Heart: Normal rate and rhythm. Lungs: Non-labored breathing. No respiratory distress. Abdomen: Without distension. Psychiatric: AAOx3  Lower Extremity:  Vascular:   Right DP and PT pulses are present. Left DP and PT pulses are present. CRT < 3 seconds at the digits. +0/4 edema noted at bilateral lower extremities. Pedal hair is absent. Skin temperature is WNL bilaterally. Musculoskeletal:  MMT is 5/5 in all muscle compartments bilaterally. ROM at the 1st MPJ and ankle joint are decreased bilaterally with the leg extended. Pain on palpation of right lateral foot wound.  Left 5th digit amputation noted    Dermatological:  Lower extremity wound(s) as noted below:    Wound #: 1  Location: right lateral foot  Length 1.3cm: Width 1 cm: Depth 0.4cm:   Deepest Tissue Noted in Base: deep fascia  Probe to Bone: No  Peripheral Skin Description: Attached  Granulation: 30% Fibrotic Tissue: 70% Necrotic Tissue: 0%   Drainage Amount: minimal, serosanguinous  Signs of Infection: Yes  malodor, localized erythema    Neurological:  Gross sensation is diminished. Protective sensation is diminished. Patient Reports numbness and/or paresthesias. Clinical Images 11/28/23: Additional data:     Lab Results: I have personally reviewed pertinent labs including:    Results from last 7 days   Lab Units 11/28/23  0851   WBC Thousand/uL 5.84   HEMOGLOBIN g/dL 13.0   HEMATOCRIT % 38.9   PLATELETS Thousands/uL 233   NEUTROS PCT % 63   LYMPHS PCT % 22   MONOS PCT % 10   EOS PCT % 4     Results from last 7 days   Lab Units 11/28/23  0851   POTASSIUM mmol/L 4.7   CHLORIDE mmol/L 99   CO2 mmol/L 23   BUN mg/dL 32*   CREATININE mg/dL 1.35*   CALCIUM mg/dL 9.4                       Imaging: I have personally reviewed pertinent reports in PACS. EKG, Pathology, and Other Studies: I have personally reviewed pertinent reports. ** Please Note: Portions of the record may have been created with voice recognition software. Occasional wrong word or "sound a like" substitutions may have occurred due to the inherent limitations of voice recognition software. Read the chart carefully and recognize, using context, where substitutions have occurred.  **

## 2023-11-28 NOTE — H&P
Podiatry - H&P  Sandhya Ferro 59 y.o. male MRN: [id number]  Unit/Bed#: ED-10 Encounter: [id number]  Admission Date: 11/28/23    ASSESSMENT:    Sandhya Ferro is a 59 y.o. male with:    Right foot wound secondary to nail puncture    PLAN:    Patient to be admitted under podiatry service of Dr. Santiago Lawrence for Right foot wound with cellulitis. No acute need for podiatric surgical intervention at this time. Right foot XRs reviewed, per my personal read no evidence of osteomyelitis, no foreign bodies, noted calcified arteries throughout foot. LEADs ordered to r/o proximal stenosis. Will continue to monitor labs/vitals. Local wound care consisting of betadine/DSD. Wound care instructions placed. Elevation on green foam wedges or pillows when non-ambulatory. Continuation of all home medications including oral hyperglycemics per patient request, patient refusing insulin sliding scale at this time. Plan discussed with Dr. Santiago Lawrence. Antibiotics started: Zosyn  Pharmacologic VTE Prophylaxis: Heparin   Mechanical VTE Prophylaxis: sequential compression device   Weightbearing status: as tolerated in surgical shoe      Disposition:  Patient requires >2 midnight stay for IV antibiotics and serial wound evaluations. SUBJECTIVE:    History of Present Illness:    Sandhya Ferro is a 59 y.o. male who is originally admitted 11/28/2023 due to right foot wound. Patient has a past medical history of T2DM with neuropathy, CKD, sciatica, opioid dependence. We are consulted for Right foot wound. Patient reports stepping on a nail in his right foot sometime in the beginning of last week. He states that he was wearing slippers at home at the time he stepped on the nail. Patient admits to neuropathy however complains of recent right foot pain and believes his last tetanus was last year. He reports discoloration of right foot with swelling and drainage that reports has been clear/yellow/bloody.   Patient denies any fever or chills. Review of Systems:    Constitutional: Negative. HENT: Negative. Eyes: Negative. Respiratory: Negative. Cardiovascular: Negative. Gastrointestinal: Negative. Musculoskeletal: right foot pain  Skin: right foot wound  Neurological: Diminished sensation bilateral feet  Psych: Negative. Past Medical and Surgical History:     Past Medical History:   Diagnosis Date    Chronic pain disorder     Diabetes mellitus ([address])     H/O eye surgery     High blood sugar     Hypertension     Liver disease        Past Surgical History:   Procedure Laterality Date    HERNIA REPAIR      KIDNEY SURGERY      MOUTH SURGERY      [place] AMPUTATION METATARSAL W/TOE SINGLE Left 6/1/2022    Procedure: RAY RESECTION FOOT;  Surgeon: Addie Libman, DPM;  [facility];  Service: Podiatry    [place] RPR AA HERNIA 1ST < 3 CM REDUCIBLE N/A 7/14/2023    Procedure: REPAIR HERNIA INCISIONAL;  Surgeon: Gem Patel MD;  [facility];  Service: General    WOUND DEBRIDEMENT Left 4/28/2022    Procedure: Left foot washout;  Surgeon: Addie Libman, DPM;  [facility];  Service: Podiatry    WOUND DEBRIDEMENT Left 6/6/2022    Procedure: DEBRIDEMENT WOUND [facility] OUT); Surgeon: Addie Libman, DPM;  [facility];  Service: Podiatry       Meds/Allergies:    (Not in a hospital admission)      Allergies   Allergen Reactions    Cephalexin Hives     Skin peeling  Tolerates penicillins (tolerated unasyn and zosyn)    Metformin GI Intolerance       Social History:    Social History     Marital Status: Registered Domestic Partner    Substance Use History:   Social History     Substance and Sexual Activity   Alcohol Use Yes    Comment: ocassional     Social History     Tobacco Use   Smoking Status Never   Smokeless Tobacco Never     Social History     Substance and Sexual Activity   Drug Use Never       Family History:    History reviewed. No pertinent family history.       OBJECTIVE:    First Vitals: Blood Pressure: 150/87 (11/28/23 0823)  Pulse: 88 (11/28/23 0823)  Temperature: 98.2 °F (36.8 °C) (11/28/23 0823)  Temp Source: Oral (11/28/23 0823)  Respirations: 20 (11/28/23 0823)  Weight - Scale: 123 kg (270 lb 8.1 oz) (11/28/23 0823)  SpO2: 96 % (11/28/23 0823)    Current Vitals:   Blood Pressure: 150/87 (11/28/23 0823)  Pulse: 88 (11/28/23 0823)  Temperature: 98.2 °F (36.8 °C) (11/28/23 0823)  Temp Source: Oral (11/28/23 0823)  Respirations: 20 (11/28/23 0823)  Weight - Scale: 123 kg (270 lb 8.1 oz) (11/28/23 0823)  SpO2: 96 % (11/28/23 0823)    Physical Exam    General Appearance: Alert, cooperative, no distress. HEENT: Head normocephalic, atraumatic, without obvious abnormality. Heart: Normal rate and rhythm. No murmurs or gallops noted. Lungs: Non-labored breathing. No respiratory distress. No wheezing, rhonchi, or rales. Abdomen:  Soft, nontender, without distension. Psychiatric: AAOx3  Lower Extremity:  Vascular:   Right DP and PT pulses are present. Left DP and PT pulses are present. CRT < 3 seconds at the digits. +0/4 edema noted at bilateral lower extremities. Pedal hair is absent. Skin temperature is WNL bilaterally. Musculoskeletal:  MMT is 5/5 in all muscle compartments bilaterally. ROM at the 1st MPJ and ankle joint are decreased bilaterally with the leg extended. Pain on palpation of right lateral foot wound. Left 5th digit amputation noted     Dermatological:  Lower extremity wound(s) as noted below:     Wound #: 1  Location: right lateral foot  Length 1.3cm: Width 1 cm: Depth 0.4cm:   Deepest Tissue Noted in Base: deep fascia  Probe to Bone: No  Peripheral Skin Description: Attached  Granulation: 30% Fibrotic Tissue: 70% Necrotic Tissue: 0%   Drainage Amount: minimal, serosanguinous  Signs of Infection: Yes  malodor, localized erythema     Neurological:  Gross sensation is diminished. Protective sensation is diminished. Patient Reports numbness and/or paresthesias.     Clinical Images 11/28/23: Additional Data:    Lab Results:   Admission on 11/28/2023   Component Date Value    WBC 11/28/2023 5.84     RBC 11/28/2023 4.33     Hemoglobin 11/28/2023 13.0     Hematocrit 11/28/2023 38.9     MCV 11/28/2023 90     MCH 11/28/2023 30.0     MCHC 11/28/2023 33.4     RDW 11/28/2023 13.6     MPV 11/28/2023 9.5     Platelets 50/60/8652 233     nRBC 11/28/2023 0     Neutrophils Relative 11/28/2023 63     Immat GRANS % 11/28/2023 0     Lymphocytes Relative 11/28/2023 22     Monocytes Relative 11/28/2023 10     Eosinophils Relative 11/28/2023 4     Basophils Relative 11/28/2023 1     Neutrophils Absolute 11/28/2023 3.67     Immature Grans Absolute 11/28/2023 0.02     Lymphocytes Absolute 11/28/2023 1.31     Monocytes Absolute 11/28/2023 0.58     Eosinophils Absolute 11/28/2023 0.21     Basophils Absolute 11/28/2023 0.05     Sodium 11/28/2023 132 (L)     Potassium 11/28/2023 4.7     Chloride 11/28/2023 99     CO2 11/28/2023 23     ANION GAP 11/28/2023 10     BUN 11/28/2023 32 (H)     Creatinine 11/28/2023 1.35 (H)     Glucose 11/28/2023 182 (H)     Calcium 11/28/2023 9.4     eGFR 11/28/2023 56     LACTIC ACID 11/28/2023 2.1 (HH)        Cultures:          Imaging: I have personally reviewed pertinent reports in PACS  No results found. EKG, Pathology, and Other Studies: I have personally reviewed pertinent reports.       Code Status: Prior

## 2023-11-28 NOTE — DISCHARGE INSTR - AVS FIRST PAGE
Discharge Instructions - Podiatry    Weight Bearing Status: Weight bearing as tolerated in surgical shoe                   Pain: Continue analgesics as directed    Follow-up appointment instructions: Please make an appointment within one week of discharge with Dr. Shanelle Boo . Contact sooner if any increase in pain, or signs of infection occur    Wound Care: Remove dressing, wash with soap and water. Pat dry. Apply Maxorb and a sterile bandage to the area.  Change daily or as needed

## 2023-11-28 NOTE — ED NOTES
ED tech, [facility], educated patient on changing into a gown due to admission. Patient refused to change into gown.      Ayden Max RN  11/28/23 4504

## 2023-11-28 NOTE — PLAN OF CARE
Problem: Potential for Falls  Goal: Patient will remain free of falls  Description: INTERVENTIONS:  - Educate patient/family on patient safety including physical limitations  - Instruct patient to call for assistance with activity   - Consult OT/PT to assist with strengthening/mobility   - Keep Call bell within reach  - Keep bed low and locked with side rails adjusted as appropriate  - Keep care items and personal belongings within reach  - Initiate and maintain comfort rounds  - Make Fall Risk Sign visible to staff  - Offer Toileting every 2 Hours, in advance of need  - Initiate/Maintain bed alarm  - Obtain necessary fall risk management equipment: none  - Apply yellow socks and bracelet for high fall risk patients  - Consider moving patient to room near nurses station  Outcome: Progressing     Problem: PAIN - ADULT  Goal: Verbalizes/displays adequate comfort level or baseline comfort level  Description: Interventions:  - Encourage patient to monitor pain and request assistance  - Assess pain using appropriate pain scale  - Administer analgesics based on type and severity of pain and evaluate response  - Implement non-pharmacological measures as appropriate and evaluate response  - Consider cultural and social influences on pain and pain management  - Notify physician/advanced practitioner if interventions unsuccessful or patient reports new pain  Outcome: Progressing     Problem: DISCHARGE PLANNING  Goal: Discharge to home or other facility with appropriate resources  Description: INTERVENTIONS:  - Identify barriers to discharge w/patient and caregiver  - Arrange for needed discharge resources and transportation as appropriate  - Identify discharge learning needs (meds, wound care, etc.)  - Arrange for interpretive services to assist at discharge as needed  - Refer to Case Management Department for coordinating discharge planning if the patient needs post-hospital services based on physician/advanced practitioner order or complex needs related to functional status, cognitive ability, or social support system  Outcome: Progressing

## 2023-11-28 NOTE — DISCHARGE INSTR - OTHER ORDERS
Discharge Instructions - Podiatry    Weight Bearing Status: Weight bearing as tolerated in surgical shoe                   Pain: Continue analgesics as directed    Follow-up appointment instructions: Please make an appointment within one week of discharge with Dr. Balta Nelson. Contact sooner if any increase in pain, or signs of infection occur    Wound Care: Leave dressings clean, dry, and intact between professional dressing changes    Nursing Instructions: Please apply Betadine wet to dry. Then cover with Gauze and secure with Kerlix and tape. Please change dressing every Monday, Wednesday, and Friday .

## 2023-11-28 NOTE — SEPSIS NOTE
Sepsis Note   Keyon Esquivel 59 y.o. male MRN: [id number]  Unit/Bed#: ED-10 Encounter: [id number]       Initial Sepsis Screening       [address] Name 11/28/23 1003                Is the patient's history suggestive of a new or worsening infection? Yes (Proceed)  -AH        Suspected source of infection wound infection  -AH        Indicate SIRS criteria --        Are two or more of the above signs & symptoms of infection both present and new to the patient? No  -AH        Assess for evidence of organ dysfunction: Are any of the below criteria present within 6 hours of suspected infection and SIRS criteria that are NOT considered to be chronic conditions? --                  User Key  (r) = Recorded By, (t) = Taken By, (c) = Cosigned By      Initials Name Provider Type     Jaylyn Mckeon PA-C Physician Assistant                        Body mass index is 36.69 kg/m².   Wt Readings from Last 1 Encounters:   11/28/23 123 kg (270 lb 8.1 oz)        Ideal body weight: 77.6 kg (171 lb 1.2 oz)  Adjusted ideal body weight: 95.6 kg (210 lb 13.6 oz)

## 2023-11-28 NOTE — CASE MANAGEMENT
Case Management Assessment & Discharge Planning Note    Patient name Janet Crooks  Location ED-10/ED-10 MRN [id number]  : 1965 Date 2023       Current Admission Date: 2023  Current Admission Diagnosis:Nail wound of right foot   Patient Active Problem List    Diagnosis Date Noted    Nail wound of right foot 2023    Dupuytren's disease of finger with nodules without contracture 10/17/2023    Acquired absence of other left toe(s) ([address]) 2023    Incisional hernia without obstruction or gangrene 2023    Ventral hernia without obstruction or gangrene 06/15/2023    Arthritis 2022    Continuous opioid dependence ([address]) 10/25/2022    Dyslipidemia 2022    H/O diabetic foot ulcer 2022    Chronic kidney disease, stage 3 ([address]) 2022    Erectile dysfunction 2021    Neuropathy 2021    Low back pain with sciatica 2021    Lumbar radiculopathy 2021    Cervical radiculopathy 2021    Class 2 severe obesity with serious comorbidity and body mass index (BMI) of 35.0 to 35.9 in adult  2021    Essential hypertension     Type 2 diabetes mellitus with chronic kidney disease, without long-term current use of insulin (HCC)       LOS (days): 0  Geometric Mean LOS (GMLOS) (days): 2.60  Days to GMLOS:2.5     OBJECTIVE:              Current admission status: Inpatient       Preferred Pharmacy:   [facility], [address] [facility]  [address] [place] [address]  Phone: [phone] Fax: [phone]    Primary Care Provider: Raghavendra Skinner MD    Primary Insurance: MEDICARE  Secondary Insurance: BLUE CROSS    ASSESSMENT:  [employer]       Kellee dutta [facility] Representative - Significant Other   Primary Phone: [phone] (Mobile)                 Advance Directives  Does patient have a Health Care POA?: Yes  Does patient have Advance Directives?: Yes  Advance Directives: Living will  Primary Contact: Jessika Kamara         Readmission Root Cause  30 Day Readmission: No    Patient Information  Admitted from[de-identified] Home  Mental Status: Alert  During Assessment patient was accompanied by: Spouse  Assessment information provided by[de-identified] Patient  Primary Caregiver: Self  Support Systems: Spouse/significant other, Self  [place] of Residence: [address] [place] [address] do you live in?: [address] entry access options.  Select all that apply.: Stairs  Number of steps to enter home.: 6  Do the steps have railings?: No  Type of Current Residence: [address]  Living Arrangements: Lives w/ Spouse/significant other  Is patient a ?: No    Activities of Daily Living Prior to Admission  Functional Status: Independent  Completes ADLs independently?: Yes  Ambulates independently?: Yes  Does patient use assisted devices?: No  Does patient currently own DME?: Yes  What DME does the patient currently own?: Straight Cane, Rollator  Does patient have a history of Outpatient Therapy (PT/OT)?: No  Does the patient have a history of Short-Term Rehab?: No  Does patient have a history of HHC?: No  Does patient currently have [address]?: No         Patient Information Continued  Income Source: SSI/SSD  Does patient have prescription coverage?: Yes  Does patient receive dialysis treatments?: No  Does patient have a history of substance abuse?: No  Does patient have a history of Mental Health Diagnosis?: No         Means of Transportation  Means of Transport to Appts[de-identified] Drives Self      Housing Stability: Low Risk  (4/29/2022)    Housing Stability Vital Sign     Unable to Pay for Housing in the Last Year: No     Number of Places Lived in the Last Year: 1     Unstable Housing in the Last Year: No   Food Insecurity: No Food Insecurity (4/29/2022)    Hunger Vital Sign     Worried About Running Out of Food in the Last Year: Never true     Ran Out of Food in the Last Year: Never true   Transportation Needs: No Transportation Needs (4/29/2022)    Ella Bustos - Transportation     Lack of Transportation (Medical): No     Lack of Transportation (Non-Medical): No   Utilities: Not on file       DISCHARGE DETAILS:       Freedom of Choice: Yes           Did patient/caregiver verbalize understanding of patient care needs?: Yes  Were patient/caregiver advised of the risks associated with not following Treatment Team discharge recommendations?: Yes   Additional Comments: CM engaged pt at bedside for assessment. Pt lives with wife in [address] [place] home where he is independent. Pt has no history of [facility] PT or OT. No cm needs identified at this time.

## 2023-11-29 PROBLEM — L03.115 CELLULITIS OF RIGHT FOOT: Status: ACTIVE | Noted: 2023-11-29

## 2023-11-29 LAB
ANION GAP SERPL CALCULATED.3IONS-SCNC: 6 MMOL/L
BASOPHILS # BLD AUTO: 0.04 THOUSANDS/ÂΜL (ref 0–0.1)
BASOPHILS NFR BLD AUTO: 1 % (ref 0–1)
BUN SERPL-MCNC: 28 MG/DL (ref 5–25)
CALCIUM SERPL-MCNC: 9 MG/DL (ref 8.4–10.2)
CHLORIDE SERPL-SCNC: 103 MMOL/L (ref 96–108)
CO2 SERPL-SCNC: 26 MMOL/L (ref 21–32)
CREAT SERPL-MCNC: 1.25 MG/DL (ref 0.6–1.3)
EOSINOPHIL # BLD AUTO: 0.21 THOUSAND/ÂΜL (ref 0–0.61)
EOSINOPHIL NFR BLD AUTO: 4 % (ref 0–6)
ERYTHROCYTE [DISTWIDTH] IN BLOOD BY AUTOMATED COUNT: 13.5 % (ref 11.6–15.1)
GFR SERPL CREATININE-BSD FRML MDRD: 62 ML/MIN/1.73SQ M
GLUCOSE SERPL-MCNC: 101 MG/DL (ref 65–140)
GLUCOSE SERPL-MCNC: 123 MG/DL (ref 65–140)
GLUCOSE SERPL-MCNC: 130 MG/DL (ref 65–140)
GLUCOSE SERPL-MCNC: 94 MG/DL (ref 65–140)
HCT VFR BLD AUTO: 37.8 % (ref 36.5–49.3)
HGB BLD-MCNC: 12.4 G/DL (ref 12–17)
IMM GRANULOCYTES # BLD AUTO: 0.02 THOUSAND/UL (ref 0–0.2)
IMM GRANULOCYTES NFR BLD AUTO: 0 % (ref 0–2)
LYMPHOCYTES # BLD AUTO: 1.45 THOUSANDS/ÂΜL (ref 0.6–4.47)
LYMPHOCYTES NFR BLD AUTO: 27 % (ref 14–44)
MCH RBC QN AUTO: 29.5 PG (ref 26.8–34.3)
MCHC RBC AUTO-ENTMCNC: 32.8 G/DL (ref 31.4–37.4)
MCV RBC AUTO: 90 FL (ref 82–98)
MONOCYTES # BLD AUTO: 0.55 THOUSAND/ÂΜL (ref 0.17–1.22)
MONOCYTES NFR BLD AUTO: 10 % (ref 4–12)
NEUTROPHILS # BLD AUTO: 3.2 THOUSANDS/ÂΜL (ref 1.85–7.62)
NEUTS SEG NFR BLD AUTO: 58 % (ref 43–75)
NRBC BLD AUTO-RTO: 0 /100 WBCS
PLATELET # BLD AUTO: 220 THOUSANDS/UL (ref 149–390)
PMV BLD AUTO: 9.5 FL (ref 8.9–12.7)
POTASSIUM SERPL-SCNC: 4.5 MMOL/L (ref 3.5–5.3)
RBC # BLD AUTO: 4.21 MILLION/UL (ref 3.88–5.62)
SODIUM SERPL-SCNC: 135 MMOL/L (ref 135–147)
WBC # BLD AUTO: 5.47 THOUSAND/UL (ref 4.31–10.16)

## 2023-11-29 PROCEDURE — 82948 REAGENT STRIP/BLOOD GLUCOSE: CPT

## 2023-11-29 PROCEDURE — 85025 COMPLETE CBC W/AUTO DIFF WBC: CPT

## 2023-11-29 PROCEDURE — 80048 BASIC METABOLIC PNL TOTAL CA: CPT

## 2023-11-29 PROCEDURE — 99222 1ST HOSP IP/OBS MODERATE 55: CPT | Performed by: STUDENT IN AN ORGANIZED HEALTH CARE EDUCATION/TRAINING PROGRAM

## 2023-11-29 RX ADMIN — Medication 7.5 MG: at 15:32

## 2023-11-29 RX ADMIN — CARVEDILOL 25 MG: 25 TABLET, FILM COATED ORAL at 18:43

## 2023-11-29 RX ADMIN — TRAMADOL HYDROCHLORIDE 100 MG: 50 TABLET, COATED ORAL at 18:43

## 2023-11-29 RX ADMIN — HYDROCHLOROTHIAZIDE 12.5 MG: 12.5 TABLET ORAL at 08:02

## 2023-11-29 RX ADMIN — OXYCODONE HYDROCHLORIDE AND ACETAMINOPHEN 1000 MG: 500 TABLET ORAL at 08:02

## 2023-11-29 RX ADMIN — HYDROCHLOROTHIAZIDE 12.5 MG: 12.5 TABLET ORAL at 18:43

## 2023-11-29 RX ADMIN — LISINOPRIL 20 MG: 20 TABLET ORAL at 18:43

## 2023-11-29 RX ADMIN — LISINOPRIL 20 MG: 20 TABLET ORAL at 08:01

## 2023-11-29 RX ADMIN — CARVEDILOL 25 MG: 25 TABLET, FILM COATED ORAL at 08:02

## 2023-11-29 RX ADMIN — PIPERACILLIN SODIUM AND TAZOBACTAM SODIUM 4.5 G: 36; 4.5 INJECTION, POWDER, LYOPHILIZED, FOR SOLUTION INTRAVENOUS at 10:11

## 2023-11-29 RX ADMIN — OXYCODONE HYDROCHLORIDE 5 MG: 5 TABLET ORAL at 01:01

## 2023-11-29 RX ADMIN — PIPERACILLIN SODIUM AND TAZOBACTAM SODIUM 4.5 G: 36; 4.5 INJECTION, POWDER, LYOPHILIZED, FOR SOLUTION INTRAVENOUS at 18:43

## 2023-11-29 RX ADMIN — OXYCODONE HYDROCHLORIDE AND ACETAMINOPHEN 1000 MG: 500 TABLET ORAL at 20:04

## 2023-11-29 RX ADMIN — OXYCODONE HYDROCHLORIDE 5 MG: 5 TABLET ORAL at 08:02

## 2023-11-29 RX ADMIN — PIPERACILLIN SODIUM AND TAZOBACTAM SODIUM 4.5 G: 36; 4.5 INJECTION, POWDER, LYOPHILIZED, FOR SOLUTION INTRAVENOUS at 01:02

## 2023-11-29 NOTE — ASSESSMENT & PLAN NOTE
Lab Results   Component Value Date    HGBA1C 6.7 (H) 11/28/2023       Recent Labs     11/28/23  1030 11/29/23  0726 11/29/23  1124   POCGLU 138 101 130   Recent A1c repeated at 6.7, well-controlled  Declined insulin while admitted  Home medications were continued, metformin.   Jardiance and Ozempic on hold as nonformulary  Monitor Accu-Cheks

## 2023-11-29 NOTE — ASSESSMENT & PLAN NOTE
Presented with nail punctured to the right foot resulting in wound and associated cellulitis  Admitted under podiatry service  X-ray imaging without any acute process  Recent Tdap on 02/2022  Managing conservatively with IV antibiotics, wound care  Wound cultures growing [facility]  Medicine consulted for medical management, will continue to follow along

## 2023-11-29 NOTE — PLAN OF CARE
Problem: Potential for Falls  Goal: Patient will remain free of falls  Description: INTERVENTIONS:  - Educate patient/family on patient safety including physical limitations  - Instruct patient to call for assistance with activity   - Consult OT/PT to assist with strengthening/mobility   - Keep Call bell within reach  - Keep bed low and locked with side rails adjusted as appropriate  - Keep care items and personal belongings within reach  - Initiate and maintain comfort rounds  - Make Fall Risk Sign visible to staff  - Offer Toileting every 2 Hours, in advance of need  - Initiate/Maintain bed/chair alarm  - Apply yellow socks and bracelet for high fall risk patients  - Consider moving patient to room near nurses station  Outcome: Progressing     Problem: PAIN - ADULT  Goal: Verbalizes/displays adequate comfort level or baseline comfort level  Description: Interventions:  - Encourage patient to monitor pain and request assistance  - Assess pain using appropriate pain scale  - Administer analgesics based on type and severity of pain and evaluate response  - Implement non-pharmacological measures as appropriate and evaluate response  - Consider cultural and social influences on pain and pain management  - Notify physician/advanced practitioner if interventions unsuccessful or patient reports new pain  Outcome: Progressing     Problem: DISCHARGE PLANNING  Goal: Discharge to home or other facility with appropriate resources  Description: INTERVENTIONS:  - Identify barriers to discharge w/patient and caregiver  - Arrange for needed discharge resources and transportation as appropriate  - Identify discharge learning needs (meds, wound care, etc.)  - Arrange for interpretive services to assist at discharge as needed  - Refer to Case Management Department for coordinating discharge planning if the patient needs post-hospital services based on physician/advanced practitioner order or complex needs related to functional status, cognitive ability, or social support system  Outcome: Progressing

## 2023-11-29 NOTE — CONSULTS
[address] [facility]  Consult  Name: Jessie Petersen 59 y.o. male I MRN: [id number]  Unit/Bed#: E5 -01 I Date of Admission: 11/28/2023   Date of Service: 11/29/2023 I Hospital Day: 1    Inpatient consult to Internal Medicine  Consult performed by: Rosanne Nayak MD  Consult ordered by: Leti Weber [facility], [facility]          Assessment/Plan   Cellulitis of right foot  Assessment & Plan  Currently on IV antibiotics per podiatry  Wound appears to be improving  Patient without significant erythema, fevers or white count    Chronic kidney disease, stage 3 [facility])  Assessment & Plan  Lab Results   Component Value Date    EGFR 62 11/29/2023    EGFR 56 11/28/2023    EGFR 43 07/14/2023    CREATININE 1.25 11/29/2023    CREATININE 1.35 (H) 11/28/2023    CREATININE 1.67 (H) 07/14/2023     Renal functions currently stable after liter of fluids given on admission  Monitor BMP in am    Type 2 diabetes mellitus with chronic kidney disease, without long-term current use of insulin [facility])  Assessment & Plan  Lab Results   Component Value Date    HGBA1C 6.7 (H) 11/28/2023       Recent Labs     11/28/23  1030 11/29/23  0726 11/29/23  1124   POCGLU 138 101 130   Recent A1c repeated at 6.7, well-controlled  Declined insulin while admitted  Home medications were continued, metformin.   Jardiance and Ozempic on hold as nonformulary  Monitor Accu-Cheks    Essential hypertension  Assessment & Plan  BP currently controlled  Continue home dose of carvedilol, lisinopril and HCTZ    * Nail wound of right foot  Assessment & Plan  Presented with nail punctured to the right foot resulting in wound and associated cellulitis  Admitted under podiatry service  X-ray imaging without any acute process  Recent Tdap on 02/2022  Managing conservatively with IV antibiotics, wound care  Wound cultures growing [facility]  Medicine consulted for medical management, will continue to follow along             VTE Prophylaxis: Enoxaparin (Lovenox)  / sequential compression device     Collaboration of Care: Were Recommendations Directly Discussed with Primary Treatment Team? - Yes     History of Present Illness:    Georgette Reyes is a 59 y.o. male who is originally admitted to the podiatry service due to foot wound. We are consulted for medical management. Patient with past medical history of type 2 diabetes not on insulin, hypertension and obesity. Presented after stepping on a nail, was unaware with a history of neuropathy. He did eventually develop worsening foot pain, along with discoloration, swelling and drainage. He denies any fevers, chills, chest pain or shortness of breath. Review of Systems:    Review of Systems   Constitutional:  Negative for chills and fever. Respiratory:  Negative for shortness of breath. Cardiovascular:  Negative for chest pain. All other systems reviewed and are negative. Past Medical and Surgical History:     Past Medical History:   Diagnosis Date    Chronic pain disorder     Diabetes mellitus ([address])     H/O eye surgery     High blood sugar     Hypertension     Liver disease        Past Surgical History:   Procedure Laterality Date    HERNIA REPAIR      KIDNEY SURGERY      MOUTH SURGERY      NY AMPUTATION METATARSAL W/TOE SINGLE Left 6/1/2022    Procedure: RAY RESECTION FOOT;  Surgeon: Dulce Munoz DPM;  [facility];  Service: Podiatry    NY RPR AA HERNIA 1ST < 3 CM REDUCIBLE N/A 7/14/2023    Procedure: REPAIR HERNIA INCISIONAL;  Surgeon: Lulú Zarco MD;  [facility];  Service: General    WOUND DEBRIDEMENT Left 4/28/2022    Procedure: Left foot washout;  Surgeon: Dulce Munoz DPM;  [facility];  Service: Podiatry    WOUND DEBRIDEMENT Left 6/6/2022    Procedure: DEBRIDEMENT WOUND [facility] OUT); Surgeon: Dulce Munoz DPM;  Location[facility];  Service: Podiatry       Meds/Allergies:    all medications and allergies reviewed    Allergies:    Allergies   Allergen Reactions Cephalexin Hives     Skin peeling  Tolerates penicillins (tolerated unasyn and zosyn)    Metformin GI Intolerance       Social History:     Marital Status: Registered Domestic Partner    Substance Use History:   Social History     Substance and Sexual Activity   Alcohol Use Yes    Comment: ocassional     Social History     Tobacco Use   Smoking Status Never   Smokeless Tobacco Never     Social History     Substance and Sexual Activity   Drug Use Never       Family History:    History reviewed. No pertinent family history. Physical Exam:     Vitals:   Blood Pressure: 148/95 (11/29/23 0728)  Pulse: 85 (11/29/23 0728)  Temperature: 97.5 °F (36.4 °C) (11/29/23 0728)  Temp Source: Oral (11/29/23 0728)  Respirations: 19 (11/29/23 0728)  Weight - Scale: 123 kg (270 lb 8.1 oz) (11/28/23 0823)  SpO2: 91 % (11/29/23 0728)    Physical Exam  Vitals and nursing note reviewed. Constitutional:       Appearance: He is obese. HENT:      Head: Normocephalic. Eyes:      Conjunctiva/sclera: Conjunctivae normal.   Cardiovascular:      Rate and Rhythm: Normal rate. Pulmonary:      Breath sounds: No wheezing. Abdominal:      General: Bowel sounds are normal. There is no distension. Palpations: Abdomen is soft. Musculoskeletal:         General: No swelling. Normal range of motion. Right lower leg: No edema. Left lower leg: No edema. Skin:     General: Skin is warm and dry. Comments: Right foot in dressing   Neurological:      General: No focal deficit present. Mental Status: He is alert. Mental status is at baseline. Additional Data:     Lab Results: I have personally reviewed pertinent reports.       Results from last 7 days   Lab Units 11/29/23  0539   WBC Thousand/uL 5.47   HEMOGLOBIN g/dL 12.4   HEMATOCRIT % 37.8   PLATELETS Thousands/uL 220   NEUTROS PCT % 58   LYMPHS PCT % 27   MONOS PCT % 10   EOS PCT % 4     Results from last 7 days   Lab Units 11/29/23  0539   SODIUM mmol/L 135 POTASSIUM mmol/L 4.5   CHLORIDE mmol/L 103   CO2 mmol/L 26   BUN mg/dL 28*   CREATININE mg/dL 1.25   ANION GAP mmol/L 6   CALCIUM mg/dL 9.0   GLUCOSE RANDOM mg/dL 94             Lab Results   Component Value Date/Time    HGBA1C 6.7 (H) 11/28/2023 08:51 AM    HGBA1C 6.2 11/07/2023 09:53 AM    HGBA1C 5.5 07/14/2023 06:26 AM    HGBA1C 5.7 (H) 07/01/2023 11:38 AM     Results from last 7 days   Lab Units 11/29/23  1124 11/29/23  0726 11/28/23  1030   POC GLUCOSE mg/dl 130 101 138     Results from last 7 days   Lab Units 11/28/23  1058 11/28/23  0851   LACTIC ACID mmol/L 1.4 2.1*       Imaging: I have personally reviewed pertinent reports. VAS lower limb arterial duplex, complete bilateral   Final Result by Kashif Jara MD (11/28 2018)      XR foot 3+ views RIGHT   ED Interpretation by Joselyn Saleh PA-C (11/28 6535)   No sign fx or fb       Final Result by Mare Miller MD (11/28 1016)   No radiopaque foreign body      No acute osseous abnormality. Workstation performed: [id number]             EKG, Pathology, and Other Studies Reviewed on Admission:   EKG: none    ** Please Note: This note has been constructed using a voice recognition system.  **

## 2023-11-29 NOTE — ASSESSMENT & PLAN NOTE
Lab Results   Component Value Date    EGFR 62 11/29/2023    EGFR 56 11/28/2023    EGFR 43 07/14/2023    CREATININE 1.25 11/29/2023    CREATININE 1.35 (H) 11/28/2023    CREATININE 1.67 (H) 07/14/2023     Renal functions currently stable after liter of fluids given on admission  Monitor BMP in am

## 2023-11-29 NOTE — PROGRESS NOTES
[facility] Podiatry - Progress Note  Patient: Kika Beal 59 y.o. male   MRN: [id number]  PCP: Dioni Edwards MD  Unit/Bed#: E5 -85 Encounter: [id number]  Date Of Visit: 23    ASSESSMENT:    Kika Beal is a 59 y.o. male with:    Right foot wound 2/2 nail puncture - POA  Right foot cellulitis - POA    PLAN:    Plan to continue IV antibiotics for right foot cellulitis. Cellulitis appears to have improved. Per final XR read, no signs of osteomyelitis, foreign body or soft tissue emphysema. No plans for surgery at this time. Continue local wound care, appreciate nursing assistance with dressing changes. LEADs reviewed, no focal stenosis with toe pressures noted to be within healing range. Appreciate internal medicine for medical management. Elevation on green foam wedges or pillows when non-ambulatory. Appreciate consulting services for recommendations and management. Antibiotics started: zosyn  Pharmacologic VTE Prophylaxis: Heparin   Mechanical VTE Prophylaxis: sequential compression device   Weightbearing status: WBAT in surgical shoe    Disposition: Patient will require continued inpatient stay for the above. Anticipate discharge in 24-48 hours. SUBJECTIVE:     The patient was seen, evaluated, and assessed at bedside today. The patient was awake, alert, and in no acute distress. No acute events overnight. The patient reports frustration with timing of his medications. He complains of pain to right foot. Patient denies N/V/F/chills/SOB/CP. OBJECTIVE:     Vitals:   /95 (BP Location: Left arm)   Pulse 85   Temp 97.5 °F (36.4 °C) (Oral)   Resp 19   Wt 123 kg (270 lb 8.1 oz)   SpO2 91%   BMI 36.69 kg/m²     Temp (24hrs), Av.9 °F (36.6 °C), Min:97.5 °F (36.4 °C), Max:98.2 °F (36.8 °C)      Physical Exam:     General:  Alert, cooperative, and in no distress. Lungs: Non labored breathing  Abdomen: Soft, non-tender.   Lower extremity exam:  Cardiovascular status at baseline. Neurological status at baseline. Musculoskeletal status at baseline. No calf tenderness noted. Lower extremity wound(s) as noted below:  Wound #: 1  Location: right lateral foot  Length 1.3cm: Width 1cm: Depth 0.4cm:   Deepest Tissue Noted in Base: deep fascia  Probe to Bone: No  Peripheral Skin Description: Attached  Granulation: 20% Fibrotic Tissue: 80% Necrotic Tissue: 0%   Drainage Amount: minimal serosanguinous  Signs of Infection: Yes cellulitis, improving    Clinical Images 11/29/23:      Additional Data:     Labs:    Results from last 7 days   Lab Units 11/29/23  0539   WBC Thousand/uL 5.47   HEMOGLOBIN g/dL 12.4   HEMATOCRIT % 37.8   PLATELETS Thousands/uL 220   NEUTROS PCT % 58   LYMPHS PCT % 27   MONOS PCT % 10   EOS PCT % 4     Results from last 7 days   Lab Units 11/29/23  0539   POTASSIUM mmol/L 4.5   CHLORIDE mmol/L 103   CO2 mmol/L 26   BUN mg/dL 28*   CREATININE mg/dL 1.25   CALCIUM mg/dL 9.0           * I Have Reviewed All Lab Data Listed Above. Recent Cultures (last 7 days):     Results from last 7 days   Lab Units 11/28/23  0858 11/28/23  0851   BLOOD CULTURE  Received in Microbiology Lab. Culture in Progress. Received in Microbiology Lab. Culture in Progress. Imaging: I have personally reviewed pertinent films in PACS  EKG, Pathology, and Other Studies: I have personally reviewed pertinent reports. ** Please Note: Portions of the record may have been created with voice recognition software. Occasional wrong word or "sound a like" substitutions may have occurred due to the inherent limitations of voice recognition software. Read the chart carefully and recognize, using context, where substitutions have occurred.  **

## 2023-11-30 VITALS
DIASTOLIC BLOOD PRESSURE: 87 MMHG | OXYGEN SATURATION: 93 % | HEART RATE: 70 BPM | BODY MASS INDEX: 36.69 KG/M2 | TEMPERATURE: 97.2 F | RESPIRATION RATE: 17 BRPM | SYSTOLIC BLOOD PRESSURE: 138 MMHG | WEIGHT: 270.5 LBS

## 2023-11-30 LAB
ANION GAP SERPL CALCULATED.3IONS-SCNC: 8 MMOL/L
BASOPHILS # BLD AUTO: 0.05 THOUSANDS/ÂΜL (ref 0–0.1)
BASOPHILS NFR BLD AUTO: 1 % (ref 0–1)
BUN SERPL-MCNC: 29 MG/DL (ref 5–25)
CALCIUM SERPL-MCNC: 9.4 MG/DL (ref 8.4–10.2)
CHLORIDE SERPL-SCNC: 100 MMOL/L (ref 96–108)
CO2 SERPL-SCNC: 28 MMOL/L (ref 21–32)
CREAT SERPL-MCNC: 1.36 MG/DL (ref 0.6–1.3)
EOSINOPHIL # BLD AUTO: 0.26 THOUSAND/ÂΜL (ref 0–0.61)
EOSINOPHIL NFR BLD AUTO: 6 % (ref 0–6)
ERYTHROCYTE [DISTWIDTH] IN BLOOD BY AUTOMATED COUNT: 13.5 % (ref 11.6–15.1)
GFR SERPL CREATININE-BSD FRML MDRD: 56 ML/MIN/1.73SQ M
GLUCOSE SERPL-MCNC: 103 MG/DL (ref 65–140)
GLUCOSE SERPL-MCNC: 107 MG/DL (ref 65–140)
HCT VFR BLD AUTO: 40 % (ref 36.5–49.3)
HGB BLD-MCNC: 13.1 G/DL (ref 12–17)
IMM GRANULOCYTES # BLD AUTO: 0.01 THOUSAND/UL (ref 0–0.2)
IMM GRANULOCYTES NFR BLD AUTO: 0 % (ref 0–2)
LYMPHOCYTES # BLD AUTO: 1.42 THOUSANDS/ÂΜL (ref 0.6–4.47)
LYMPHOCYTES NFR BLD AUTO: 31 % (ref 14–44)
MAGNESIUM SERPL-MCNC: 2.3 MG/DL (ref 1.9–2.7)
MCH RBC QN AUTO: 30 PG (ref 26.8–34.3)
MCHC RBC AUTO-ENTMCNC: 32.8 G/DL (ref 31.4–37.4)
MCV RBC AUTO: 92 FL (ref 82–98)
MONOCYTES # BLD AUTO: 0.55 THOUSAND/ÂΜL (ref 0.17–1.22)
MONOCYTES NFR BLD AUTO: 12 % (ref 4–12)
NEUTROPHILS # BLD AUTO: 2.3 THOUSANDS/ÂΜL (ref 1.85–7.62)
NEUTS SEG NFR BLD AUTO: 50 % (ref 43–75)
NRBC BLD AUTO-RTO: 0 /100 WBCS
PLATELET # BLD AUTO: 258 THOUSANDS/UL (ref 149–390)
PMV BLD AUTO: 9.9 FL (ref 8.9–12.7)
POTASSIUM SERPL-SCNC: 4.5 MMOL/L (ref 3.5–5.3)
RBC # BLD AUTO: 4.37 MILLION/UL (ref 3.88–5.62)
SODIUM SERPL-SCNC: 136 MMOL/L (ref 135–147)
WBC # BLD AUTO: 4.59 THOUSAND/UL (ref 4.31–10.16)

## 2023-11-30 PROCEDURE — 83735 ASSAY OF MAGNESIUM: CPT | Performed by: STUDENT IN AN ORGANIZED HEALTH CARE EDUCATION/TRAINING PROGRAM

## 2023-11-30 PROCEDURE — 85025 COMPLETE CBC W/AUTO DIFF WBC: CPT | Performed by: STUDENT IN AN ORGANIZED HEALTH CARE EDUCATION/TRAINING PROGRAM

## 2023-11-30 PROCEDURE — 80048 BASIC METABOLIC PNL TOTAL CA: CPT | Performed by: STUDENT IN AN ORGANIZED HEALTH CARE EDUCATION/TRAINING PROGRAM

## 2023-11-30 PROCEDURE — 82948 REAGENT STRIP/BLOOD GLUCOSE: CPT

## 2023-11-30 RX ORDER — DOXYCYCLINE HYCLATE 100 MG/1
100 CAPSULE ORAL EVERY 12 HOURS SCHEDULED
Qty: 10 CAPSULE | Refills: 0 | Status: SHIPPED | OUTPATIENT
Start: 2023-11-30 | End: 2023-12-05

## 2023-11-30 RX ADMIN — CARVEDILOL 25 MG: 25 TABLET, FILM COATED ORAL at 07:30

## 2023-11-30 RX ADMIN — PIPERACILLIN SODIUM AND TAZOBACTAM SODIUM 4.5 G: 36; 4.5 INJECTION, POWDER, LYOPHILIZED, FOR SOLUTION INTRAVENOUS at 10:42

## 2023-11-30 RX ADMIN — PIPERACILLIN SODIUM AND TAZOBACTAM SODIUM 4.5 G: 36; 4.5 INJECTION, POWDER, LYOPHILIZED, FOR SOLUTION INTRAVENOUS at 02:51

## 2023-11-30 RX ADMIN — HYDROCHLOROTHIAZIDE 12.5 MG: 12.5 TABLET ORAL at 09:16

## 2023-11-30 RX ADMIN — OXYCODONE HYDROCHLORIDE AND ACETAMINOPHEN 1000 MG: 500 TABLET ORAL at 09:16

## 2023-11-30 RX ADMIN — LISINOPRIL 20 MG: 20 TABLET ORAL at 09:16

## 2023-11-30 NOTE — CASE MANAGEMENT
Case Management Discharge Planning Note    Patient name Britton Billings  Location [facility] 5 /E5 -* MRN [id number]  : 1965 Date 2023       Current Admission Date: 2023  Current Admission Diagnosis:Nail wound of right foot   Patient Active Problem List    Diagnosis Date Noted    Cellulitis of right foot 2023    Nail wound of right foot 2023    Dupuytren's disease of finger with nodules without contracture 10/17/2023    Acquired absence of other left toe(s) ([address]) 2023    Incisional hernia without obstruction or gangrene 2023    Ventral hernia without obstruction or gangrene 06/15/2023    Arthritis 2022    Continuous opioid dependence ([address]) 10/25/2022    Dyslipidemia 2022    H/O diabetic foot ulcer 2022    Chronic kidney disease, stage 3 ([address]) 2022    Erectile dysfunction 2021    Neuropathy 2021    Low back pain with sciatica 2021    Lumbar radiculopathy 2021    Cervical radiculopathy 2021    Class 2 severe obesity with serious comorbidity and body mass index (BMI) of 35.0 to 35.9 in adult  2021    Essential hypertension     Type 2 diabetes mellitus with chronic kidney disease, without long-term current use of insulin (HCC)       LOS (days): 2  Geometric Mean LOS (GMLOS) (days): 2.60  Days to GMLOS:0.6     OBJECTIVE:  Risk of Unplanned Readmission Score: 17         Current admission status: Inpatient   Preferred Pharmacy:   [facility] [place], [address] [facility],[address]  [address] [place] [address]  Phone: [phone] Fax: [phone]    Primary Care Provider: Sayra Chaney MD    Primary Insurance: MEDICARE  Secondary Insurance: BLUE CROSS    DISCHARGE DETAILS:          Additional Comments: Patient refusing the need for shalom enursing for dresisng changes. Patient advise dhe can care for it himself. Supplies can be purchased over the counter.

## 2023-11-30 NOTE — PROGRESS NOTES
Podiatry - Progress Note  Patient: Anand Maher 59 y.o. male   MRN: [id number]  PCP: Roslyn Pearl MD  Unit/Bed#: E5 [place] 34898 Encounter: [id number]  Date Of Visit: 23    ASSESSMENT:    Anand Maher is a 59 y.o. male with:    Right foot wound 2/2 nail puncture - POA  Right foot cellulitis - POA    Xray: neg for OM  Leads: GT pressure 133mmhg, no focal stenosis noted    WBC: 4.59  Vitals Stable          PLAN:    Plan to transition to PO abx, cellulitis almost resolved to right foot. WBC normal, vital signs stable   Cont LWC consisting of maxorb DSD to right foot. Dressing changed at bedside   Elevation and offloading on green foam wedges or pillows when non-ambulatory. Appreciate consulting services for recommendations and management. Antibiotics started: Zosyn  Pharmacologic VTE Prophylaxis: Sequential compression device (Venodyne)    Mechanical VTE Prophylaxis: sequential compression device   Weightbearing status: Weightbearing as tolerated right foot in surgical shoe    Disposition:  stable for d/c    SUBJECTIVE:     The patient was seen, evaluated, and assessed at bedside today. The patient was awake, alert, and in no acute distress. No acute events overnight. The patient reports no pain to his right foot. Patient denies any complaints or acute events overnight. Patient denies N/V/F/chills/SOB/CP. OBJECTIVE:     Vitals:   /86 (BP Location: Left arm)   Pulse 70   Temp (!) 97.2 °F (36.2 °C) (Axillary)   Resp 17   Wt 123 kg (270 lb 8.1 oz)   SpO2 93%   BMI 36.69 kg/m²     Temp (24hrs), Av.2 °F (36.8 °C), Min:97.2 °F (36.2 °C), Max:98.5 °F (36.9 °C)      Physical Exam:     Lungs: Non labored breathing  Abdomen: Soft, non-tender. Lower Extremity:  Cardiovascular status at baseline from admission. Neurological status at baseline from admission. Musculoskeletal status at baseline from admission. No calf tenderness noted.      Lower extremity wound(s) as noted below:  Wound #: 1  Location: right lateral foot  Length 1.3cm: Width 1cm: Depth 0.4cm:   Deepest Tissue Noted in Base: deep fascia  Probe to Bone: No  Peripheral Skin Description: Attached  Granulation: 20% Fibrotic Tissue: 80% Necrotic Tissue: 0%   Drainage Amount: minimal serosanguinous  Signs of Infection: Yes, very minor cellulitis. Almost completely resolved     Clinical Images 11/30/23: Additional Data:     Labs:    Results from last 7 days   Lab Units 11/30/23  0529   WBC Thousand/uL 4.59   HEMOGLOBIN g/dL 13.1   HEMATOCRIT % 40.0   PLATELETS Thousands/uL 258   NEUTROS PCT % 50   LYMPHS PCT % 31   MONOS PCT % 12   EOS PCT % 6     Results from last 7 days   Lab Units 11/30/23  0529   POTASSIUM mmol/L 4.5   CHLORIDE mmol/L 100   CO2 mmol/L 28   BUN mg/dL 29*   CREATININE mg/dL 1.36*   CALCIUM mg/dL 9.4           * I Have Reviewed All Lab Data Listed Above. Recent Cultures (last 7 days):     Results from last 7 days   Lab Units 11/28/23  0858 11/28/23  0851   BLOOD CULTURE  No Growth at 24 hrs. No Growth at 24 hrs. Imaging: I have personally reviewed pertinent films in PACS  EKG, Pathology, and Other Studies: I have personally reviewed pertinent reports. ** Please Note: Portions of the record may have been created with voice recognition software. Occasional wrong word or "sound a like" substitutions may have occurred due to the inherent limitations of voice recognition software. Read the chart carefully and recognize, using context, where substitutions have occurred.  **

## 2023-11-30 NOTE — RESTORATIVE TECHNICIAN NOTE
Restorative Technician Note      Patient Name: Liz Casper     Restorative Tech Visit Date: 11/30/23  Note Type: Mobility  Patient Position Upon Consult: Supine  Activity Performed: Ambulated  Patient Position at End of Consult: Seated edge of bed;  All needs within reach

## 2023-11-30 NOTE — DISCHARGE SUMMARY
PODIATRY DISCHARGE SUMMARY     Patient Name: Anand Maher   Age & Sex: 59 y.o. male   MRN: [id number]  Unit/Bed#: E5 -01   Encounter: [id number]  Length of Stay: 2 days    Principal Problem:    Nail wound of right foot  Active Problems:    Essential hypertension    Type 2 diabetes mellitus with chronic kidney disease, without long-term current use of insulin (HCC)    Chronic kidney disease, stage 3 (HCC)    Cellulitis of right foot      HPI from Admission:  Anand Maher is a 59 y.o. male who is originally admitted 11/28/2023 due to right foot wound. Patient has a past medical history of T2DM with neuropathy, CKD, sciatica, opioid dependence. We are consulted for Right foot wound. Patient reports stepping on a nail in his right foot sometime in the beginning of last week. He states that he was wearing slippers at home at the time he stepped on the nail. Patient admits to neuropathy however complains of recent right foot pain and believes his last tetanus was last year. He reports discoloration of right foot with swelling and drainage that reports has been clear/yellow/bloody. Patient denies any fever or chills. [facility]     Anand Maher is a 59 y.o. male who was admitted on 11/28/2023 for cellulitis of the right foot after a puncture wound. Patient was admitted and started on IV zosyn due to a severe PCN allergy. Patient was not administered Tdap as he has had one in the past year. Oral glycemic control regiment was continued at patient request. [facility] was consulted for medical management and optimization. Patients cellulitis improved significantly over the two day admission with stable vital signs and lab values. The decision to discharge patient was made on 11/30.  Patient was in agreement     DISCHARGE INFORMATION     PCP at Discharge: Roslyn Pearl MD    Admitting Provider: Dr. Radha Zarate   Admission Date: 11/28/2023     Discharge Provider: Dr. Radha Zarate  Discharge Date: 11/30/23    Discharge Disposition: Home  Discharge Condition: Good  Discharge with Lines: No  Discharge Diet:Diabetic  Activity Restrictions: WBAT  Test Results Pending at Discharge: None  Medications at Discharge: See after visit summary for reconciled discharge medications provided to patient and family. Discharge Diagnoses:  Principal Problem:    Nail wound of right foot  Active Problems:    Essential hypertension    Type 2 diabetes mellitus with chronic kidney disease, without long-term current use of insulin ([facility])    Chronic kidney disease, stage 3 (HCC)    Cellulitis of right foot      Consulting Providers:  none    Diagnostic Imaging Performed:  XR foot 3+ views RIGHT    Result Date: 11/28/2023  Impression: No radiopaque foreign body No acute osseous abnormality. Workstation performed: [id number]       Procedures Performed:        Code Status: Level 3 - DNAR and DNI  Advance Directive and Living Will:      Power of :    POLST:      FOLLOW-UP     PCP Outpatient Follow-up:  Yes    Consulting Providers Follow-up:  none    Active Issues Requiring Follow-Up:  Wound to right foot     Discharge Statement:  I spent 25 minutes discharging the patient. This time was spent on the day of discharge. I had direct contact with the patient on the day of discharge. Additional documentation is required if more than 30 minutes were spent on discharge. Portions of the record may have been created with voice recognition software. Occasional wrong word or "sound a like" substitutions may have occurred due to the inherent limitations of voice recognition software.   Read the chart carefully and recognize, using context, where substitutions have occurred.  ==  Anjali Rodrigues, [address] [place] [address]  Podiatric Medicine & Surgery

## 2023-12-03 LAB
BACTERIA BLD CULT: NORMAL
BACTERIA BLD CULT: NORMAL

## 2023-12-04 ENCOUNTER — HOSPITAL ENCOUNTER (EMERGENCY)
Facility: HOSPITAL | Age: 58
Discharge: HOME | End: 2023-12-04
Attending: EMERGENCY MEDICINE
Payer: MEDICARE

## 2023-12-04 ENCOUNTER — APPOINTMENT (EMERGENCY)
Dept: RADIOLOGY | Facility: HOSPITAL | Age: 58
End: 2023-12-04
Payer: MEDICARE

## 2023-12-04 VITALS
RESPIRATION RATE: 18 BRPM | BODY MASS INDEX: 36.19 KG/M2 | SYSTOLIC BLOOD PRESSURE: 159 MMHG | OXYGEN SATURATION: 95 % | DIASTOLIC BLOOD PRESSURE: 94 MMHG | HEIGHT: 72 IN | TEMPERATURE: 99 F | WEIGHT: 267.2 LBS | HEART RATE: 79 BPM

## 2023-12-04 DIAGNOSIS — S91.301A WOUND OF RIGHT FOOT: Primary | ICD-10-CM

## 2023-12-04 LAB
ANION GAP SERPL CALCULATED.3IONS-SCNC: 9 MMOL/L
BASOPHILS # BLD AUTO: 0.07 THOUSANDS/ÂΜL (ref 0–0.1)
BASOPHILS NFR BLD AUTO: 1 % (ref 0–1)
BUN SERPL-MCNC: 25 MG/DL (ref 5–25)
CALCIUM SERPL-MCNC: 9.8 MG/DL (ref 8.4–10.2)
CHLORIDE SERPL-SCNC: 100 MMOL/L (ref 96–108)
CO2 SERPL-SCNC: 24 MMOL/L (ref 21–32)
CREAT SERPL-MCNC: 1.21 MG/DL (ref 0.6–1.3)
EOSINOPHIL # BLD AUTO: 0.2 THOUSAND/ÂΜL (ref 0–0.61)
EOSINOPHIL NFR BLD AUTO: 3 % (ref 0–6)
ERYTHROCYTE [DISTWIDTH] IN BLOOD BY AUTOMATED COUNT: 13.4 % (ref 11.6–15.1)
GFR SERPL CREATININE-BSD FRML MDRD: 64 ML/MIN/1.73SQ M
GLUCOSE SERPL-MCNC: 122 MG/DL (ref 65–140)
HCT VFR BLD AUTO: 40.3 % (ref 36.5–49.3)
HGB BLD-MCNC: 13.8 G/DL (ref 12–17)
IMM GRANULOCYTES # BLD AUTO: 0.02 THOUSAND/UL (ref 0–0.2)
IMM GRANULOCYTES NFR BLD AUTO: 0 % (ref 0–2)
LYMPHOCYTES # BLD AUTO: 1.47 THOUSANDS/ÂΜL (ref 0.6–4.47)
LYMPHOCYTES NFR BLD AUTO: 25 % (ref 14–44)
MCH RBC QN AUTO: 30.4 PG (ref 26.8–34.3)
MCHC RBC AUTO-ENTMCNC: 34.2 G/DL (ref 31.4–37.4)
MCV RBC AUTO: 89 FL (ref 82–98)
MONOCYTES # BLD AUTO: 0.52 THOUSAND/ÂΜL (ref 0.17–1.22)
MONOCYTES NFR BLD AUTO: 9 % (ref 4–12)
NEUTROPHILS # BLD AUTO: 3.67 THOUSANDS/ÂΜL (ref 1.85–7.62)
NEUTS SEG NFR BLD AUTO: 62 % (ref 43–75)
NRBC BLD AUTO-RTO: 0 /100 WBCS
PLATELET # BLD AUTO: 262 THOUSANDS/UL (ref 149–390)
PMV BLD AUTO: 9.4 FL (ref 8.9–12.7)
POTASSIUM SERPL-SCNC: 4.5 MMOL/L (ref 3.5–5.3)
RBC # BLD AUTO: 4.54 MILLION/UL (ref 3.88–5.62)
SODIUM SERPL-SCNC: 133 MMOL/L (ref 135–147)
WBC # BLD AUTO: 5.95 THOUSAND/UL (ref 4.31–10.16)

## 2023-12-04 PROCEDURE — 85025 COMPLETE CBC W/AUTO DIFF WBC: CPT | Performed by: EMERGENCY MEDICINE

## 2023-12-04 PROCEDURE — 73630 X-RAY EXAM OF FOOT: CPT

## 2023-12-04 PROCEDURE — 36415 COLL VENOUS BLD VENIPUNCTURE: CPT | Performed by: EMERGENCY MEDICINE

## 2023-12-04 PROCEDURE — 99284 EMERGENCY DEPT VISIT MOD MDM: CPT

## 2023-12-04 PROCEDURE — 99284 EMERGENCY DEPT VISIT MOD MDM: CPT | Performed by: EMERGENCY MEDICINE

## 2023-12-04 PROCEDURE — 80048 BASIC METABOLIC PNL TOTAL CA: CPT | Performed by: EMERGENCY MEDICINE

## 2023-12-04 RX ORDER — CIPROFLOXACIN 500 MG/1
500 TABLET, FILM COATED ORAL ONCE
Status: COMPLETED | OUTPATIENT
Start: 2023-12-04 | End: 2023-12-04

## 2023-12-04 RX ORDER — CIPROFLOXACIN 500 MG/1
500 TABLET, FILM COATED ORAL 2 TIMES DAILY
Qty: 10 TABLET | Refills: 0 | Status: SHIPPED | OUTPATIENT
Start: 2023-12-04 | End: 2023-12-09

## 2023-12-04 RX ORDER — DOXYCYCLINE HYCLATE 100 MG/1
100 CAPSULE ORAL 2 TIMES DAILY
Qty: 10 CAPSULE | Refills: 0 | Status: SHIPPED | OUTPATIENT
Start: 2023-12-04 | End: 2023-12-09

## 2023-12-04 RX ADMIN — CIPROFLOXACIN 500 MG: 500 TABLET, FILM COATED ORAL at 21:59

## 2023-12-05 NOTE — ED PROVIDER NOTES
History  Chief Complaint   Patient presents with    Foot Pain     Pt reports R-foot is infected again, abx are not working. Reports some yellow drainage, pain, some redness, denies fevers at home. A 78-year-old male with past medical history of hypertension, diabetes, CKD & dyslipidemia; presents for reevaluation of a wound to the lateral aspect of his right foot. Patient states he sustained the wound last week after stepping on a nail in his backyard. Patient was subsequently admitted to the hospital for a suspected wound infection, treated with Zosyn and discharged home on doxycycline. Patient states he has been compliant with the doxycycline, however has had increasing purulent drainage over the past three days. Patient otherwise denies fever, chills, chest pain, shortness of breath, abdominal pain, nausea, vomiting, diarrhea, peripheral edema and rashes. History provided by:  Patient and medical records    Prior to Admission Medications   Prescriptions Last Dose Informant Patient Reported? Taking?    Accu-Chek FastClix Lancets MISC  Self No No   Sig: Use to test blood sugar once a day   Alpha-Lipoic Acid 600 MG CAPS  Self Yes No   Sig: Take 600 mg by mouth 2 (two) times a day     Apple Cider Vinegar 300 MG TABS  Self Yes No   Sig: Take 300 mg by mouth daily     Ascorbic Acid (vitamin C) 1000 MG tablet  Self Yes No   Sig: Take 1,000 mg by mouth 2 (two) times a day 2 tablet twice a day   BENFOTIAMINE PO  Self Yes No   Blood Glucose Monitoring Suppl (Accu-Chek Guide Me) w/Device KIT  Self No No   Sig: Use to check blood sugar once a day   Chromium Picolinate 200 MCG TABS  Self Yes No   Sig: Take by mouth   Empagliflozin (Jardiance) 25 MG TABS   No No   Sig: Take 1 tablet (25 mg total) by mouth daily   Garlic 9275 MG CAPS  Self Yes No   Sig: Take by mouth 2 (two) times a day    IRON, FERROUS SULFATE, PO  Self Yes No   Sig: Take by mouth   Lutein-Bilberry (LUTEIN PLUS BILBERRY PO)  Self Yes No   Sig: Take by mouth   Pyridoxine HCl (B-6 PO)  Self Yes No   Sig: Take by mouth   Turmeric (QC TUMERIC COMPLEX PO)  Self Yes No   Sig: Take 1,000 mg by mouth once Tumeric /curcimin   VITAMIN D PO  Self Yes No   Sig: Take by mouth 2 (two) times a day   Zinc 50 MG CAPS  Self Yes No   Sig: Take by mouth 2 (two) times a day    beta carotene 30 MG capsule  Self Yes No   Sig: Take 30 mg by mouth 2 (two) times a day     carvedilol (COREG) 25 mg tablet  Self No No   Sig: Take 1 tablet (25 mg total) by mouth 2 (two) times a day with meals   doxycycline hyclate (VIBRAMYCIN) 100 mg capsule   No No   Sig: Take 1 capsule (100 mg total) by mouth every 12 (twelve) hours for 5 days   glucose blood (Accu-Chek Guide) test strip  Self No No   Sig: Use to check blood sugar once a day   ketorolac (ACULAR) 0.5 % ophthalmic solution  Self Yes No   lidocaine (Lidoderm) 5 %  Self No No   Sig: Apply 1 patch topically daily Remove & Discard patch within 12 hours or as directed by MD   lisinopril-hydrochlorothiazide (PRINZIDE,ZESTORETIC) 20-12.5 MG per tablet  Self No No   Sig: Take 1 tablet by mouth 2 (two) times a day   semaglutide, 0.25 or 0.5 mg/dose, (Ozempic, 0.25 or 0.5 MG/DOSE,) 2 mg/1.5 mL injection pen   No No   Si.5 mg weekly   Patient taking differently: Inject 0.5 mg under the skin every 7 days 0.5 mg weekly   traMADol (Ultram) 50 mg tablet   No No   Si tablets in evening as needed for severe pain relief   vitamin B-12 (CYANOCOBALAMIN) 100 MCG TABS  Self Yes No   Sig: Take 50 mcg by mouth daily   vitamin E, tocopherol, 400 units capsule  Self Yes No   Sig: Take 400 Units by mouth 2 (two) times a day      Facility-Administered Medications: None       Past Medical History:   Diagnosis Date    Chronic pain disorder     Diabetes mellitus (HCC)     H/O eye surgery     High blood sugar     Hypertension     Liver disease        Past Surgical History:   Procedure Laterality Date    HERNIA REPAIR      KIDNEY SURGERY      MOUTH SURGERY OK AMPUTATION METATARSAL W/TOE SINGLE Left 6/1/2022    Procedure: RAY RESECTION FOOT;  Surgeon: Lisha Dobbins DPM;  Location[facility];  Service: Podiatry    OK RPR AA HERNIA 1ST < 3 CM REDUCIBLE N/A 7/14/2023    Procedure: REPAIR HERNIA INCISIONAL;  Surgeon: Jony Vivas MD;  [facility];  Service: General    WOUND DEBRIDEMENT Left 4/28/2022    Procedure: Left foot washout;  Surgeon: Lisha Dobbins DPM;  [facility];  Service: Podiatry    WOUND DEBRIDEMENT Left 6/6/2022    Procedure: DEBRIDEMENT WOUND [facility]); Surgeon: Lisha Dobbins DPM;  Location[facility];  Service: Podiatry       History reviewed. No pertinent family history. I have reviewed and agree with the history as documented. E-Cigarette/Vaping    E-Cigarette Use Never User      E-Cigarette/Vaping Substances    Nicotine No     THC No     CBD No     Flavoring No     Other No     Unknown No      Social History     Tobacco Use    Smoking status: Never    Smokeless tobacco: Never   Vaping Use    Vaping Use: Never used   Substance Use Topics    Alcohol use: Yes     Comment: ocassional    Drug use: Never       Review of Systems   Skin:  Positive for wound. All other systems reviewed and are negative. Physical Exam  Physical Exam  General Appearance: alert and oriented, nad, non toxic appearing  Skin:  Warm, dry, intact. No cyanosis. Wound to right lateral foot without bleeding or drainage, no surrounding erythema. HEENT: Atraumatic, normocephalic. No eye drainage. Normal hearing. Moist mucous membranes. Neck: Supple, trachea midline  Cardiac: RRR; no murmurs, rub, gallops. No pedal edema, 2+ pulses  Pulmonary: lungs CTAB; no wheezes, rales, rhonchi  Gastrointestinal: abdomen soft, nontender, nondistended; no guarding or rebound tenderness; good bowel sounds, no mass or bruits  Extremities:  No deformities.   No calf tenderness, no clubbing  Neuro:  no focal motor or sensory deficits, CN 2-12 grossly intact  Psych:  Normal mood and affect, normal judgement and insight    Vital Signs  ED Triage Vitals [12/04/23 1956]   Temperature Pulse Respirations Blood Pressure SpO2   99 °F (37.2 °C) 87 18 158/96 97 %      Temp Source Heart Rate Source Patient Position - Orthostatic VS BP Location FiO2 (%)   Oral Monitor Sitting Right arm --      Pain Score       9           Vitals:    12/04/23 1956 12/04/23 2158   BP: 158/96 159/94   Pulse: 87 79   Patient Position - Orthostatic VS: Sitting Lying         Visual Acuity      ED Medications  Medications   ciprofloxacin (CIPRO) tablet 500 mg (500 mg Oral Given 12/4/23 2159)       Diagnostic Studies  Results Reviewed       Procedure Component Value Units Date/Time    Basic metabolic panel [[id number]]  (Abnormal) Collected: 12/04/23 2043    Lab Status: Final result Specimen: Blood from Arm, Right Updated: 12/04/23 2113     Sodium 133 mmol/L      Potassium 4.5 mmol/L      Chloride 100 mmol/L      CO2 24 mmol/L      ANION GAP 9 mmol/L      BUN 25 mg/dL      Creatinine 1.21 mg/dL      Glucose 122 mg/dL      Calcium 9.8 mg/dL      eGFR 64 ml/min/1.73sq m     Narrative:      [facility] guidelines for Chronic Kidney Disease (CKD):     Stage 1 with normal or high GFR (GFR > 90 mL/min/1.73 square meters)    Stage 2 Mild CKD (GFR = 60-89 mL/min/1.73 square meters)    Stage 3A Moderate CKD (GFR = 45-59 mL/min/1.73 square meters)    Stage 3B Moderate CKD (GFR = 30-44 mL/min/1.73 square meters)    Stage 4 Severe CKD (GFR = 15-29 mL/min/1.73 square meters)    Stage 5 End Stage CKD (GFR <15 mL/min/1.73 square meters)  Note: GFR calculation is accurate only with a steady state creatinine    CBC and differential [[id number]] Collected: 12/04/23 2043    Lab Status: Final result Specimen: Blood from Arm, Right Updated: 12/04/23 2052     WBC 5.95 Thousand/uL      RBC 4.54 Million/uL      Hemoglobin 13.8 g/dL      Hematocrit 40.3 %      MCV 89 fL      MCH 30.4 pg MCHC 34.2 g/dL      RDW 13.4 %      MPV 9.4 fL      Platelets 490 Thousands/uL      nRBC 0 /100 WBCs      Neutrophils Relative 62 %      Immat GRANS % 0 %      Lymphocytes Relative 25 %      Monocytes Relative 9 %      Eosinophils Relative 3 %      Basophils Relative 1 %      Neutrophils Absolute 3.67 Thousands/µL      Immature Grans Absolute 0.02 Thousand/uL      Lymphocytes Absolute 1.47 Thousands/µL      Monocytes Absolute 0.52 Thousand/µL      Eosinophils Absolute 0.20 Thousand/µL      Basophils Absolute 0.07 Thousands/µL                    XR foot 3+ views RIGHT   ED Interpretation by Donald Mesa DO (12/04 2108)   No acute osseous abnormality    Image independently interpreted by myself                  Procedures  Procedures         ED Course  ED Course as of 12/04/23 2305   Mon Dec 04, 2023   2140 Spoke with Dr. Rose Lopez, podiatry resident, reviewing pt's history, presentation and work up. Recommends adding cipro to pt's antibiotic regimen, continuing doxycycline. Pt to follow up this week with Dr. Alves Bears 20yo+      Flowsheet Row Most Recent Value   Initial Alcohol Screen: US AUDIT-C     1. How often do you have a drink containing alcohol? 0 Filed at: 12/04/2023 2044   2. How many drinks containing alcohol do you have on a typical day you are drinking? 0 Filed at: 12/04/2023 2044   3a. Male UNDER 65: How often do you have five or more drinks on one occasion? 0 Filed at: 12/04/2023 2044   Audit-C Score 0 Filed at: 12/04/2023 2044   GARCÍA: How many times in the past year have you. .. Used an illegal drug or used a prescription medication for non-medical reasons? Never Filed at: 12/04/2023 2044                      Medical Decision Making  A 70-year-old male presents for reevaluation of a wound to his right lateral foot, reporting increasing purulent drainage over the past three days. He is otherwise well-appearing without systemic symptoms.   On exam, wound does not appear acutely infected. Will check labs and x-ray to evaluate for osteomyelitis. Will discuss with podiatry. Labs reassuring for acute pathology, XR without findings to suggest osteomyelitis. Patient updated on lab and x-ray results, along with recommendations by podiatry. He is agreeable to continued outpatient antibiotics with Doxy and Cipro, however reports finishing his initial prescription for doxycycline. Will give additional 5-day course of Doxy in addition to Cipro. Recommend he call podiatry tomorrow for an appointment this week. Amount and/or Complexity of Data Reviewed  Labs: ordered. Radiology: ordered and independent interpretation performed. Risk  Prescription drug management. Disposition  Final diagnoses:   Wound of right foot     Time reflects when diagnosis was documented in both MDM as applicable and the Disposition within this note       Time User Action Codes Description Comment    12/4/2023  9:49 PM [email or web address] [address] [place] [address] Wound of right foot           ED Disposition       ED Disposition   Discharge    Condition   Stable    Date/Time   Mon Dec 4, 2023 2146    10 [facility] discharge to home/self care.                    Follow-up Information       Follow up With Specialties Details Why Contact Info    Britany Miles DPM Podiatry Schedule an appointment as soon as possible for a visit  For re-evaluation [address] [address]  [phone]              Discharge Medication List as of 12/4/2023  9:50 PM        START taking these medications    Details   ciprofloxacin (CIPRO) 500 mg tablet Take 1 tablet (500 mg total) by mouth 2 (two) times a day for 5 days, Starting Mon 12/4/2023, Until Sat 12/9/2023, Normal      !! doxycycline hyclate (VIBRAMYCIN) 100 mg capsule Take 1 capsule (100 mg total) by mouth 2 (two) times a day for 5 days, Starting Mon 12/4/2023, Until Sat 12/9/2023, Normal       !! - Potential duplicate medications found. Please discuss with provider.         CONTINUE these medications which have NOT CHANGED    Details   Accu-Chek FastClix Lancets MISC Use to test blood sugar once a day, Normal      Alpha-Lipoic Acid 600 MG CAPS Take 600 mg by mouth 2 (two) times a day  , Historical Med      Apple Cider Vinegar 300 MG TABS Take 300 mg by mouth daily  , Historical Med      Ascorbic Acid (vitamin C) 1000 MG tablet Take 1,000 mg by mouth 2 (two) times a day 2 tablet twice a day, Historical Med      BENFOTIAMINE PO Starting Thu 6/1/2023, Historical Med      beta carotene 30 MG capsule Take 30 mg by mouth 2 (two) times a day  , Historical Med      Blood Glucose Monitoring Suppl (Accu-Chek Guide Me) w/Device KIT Use to check blood sugar once a day, Normal      carvedilol (COREG) 25 mg tablet Take 1 tablet (25 mg total) by mouth 2 (two) times a day with meals, Starting Wed 8/23/2023, Normal      Chromium Picolinate 200 MCG TABS Take by mouth, Historical Med      !! doxycycline hyclate (VIBRAMYCIN) 100 mg capsule Take 1 capsule (100 mg total) by mouth every 12 (twelve) hours for 5 days, Starting Thu 11/30/2023, Until Tue 12/5/2023, Normal      Empagliflozin (Jardiance) 25 MG TABS Take 1 tablet (25 mg total) by mouth daily, Starting Mon 10/16/2023, Normal      Garlic 6759 MG CAPS Take by mouth 2 (two) times a day , Historical Med      glucose blood (Accu-Chek Guide) test strip Use to check blood sugar once a day, Normal      IRON, FERROUS SULFATE, PO Take by mouth, Historical Med      ketorolac (ACULAR) 0.5 % ophthalmic solution Starting Tue 8/22/2023, Historical Med      lidocaine (Lidoderm) 5 % Apply 1 patch topically daily Remove & Discard patch within 12 hours or as directed by MD, Starting Fri 7/29/2022, Normal      lisinopril-hydrochlorothiazide (PRINZIDE,ZESTORETIC) 20-12.5 MG per tablet Take 1 tablet by mouth 2 (two) times a day, Starting Wed 8/23/2023, Normal      Lutein-Bilberry (LUTEIN PLUS BILBERRY PO) Take by mouth, Historical Med      Pyridoxine HCl (B-6 PO) Take by mouth, Historical Med      semaglutide, 0.25 or 0.5 mg/dose, (Ozempic, 0.25 or 0.5 MG/DOSE,) 2 mg/1.5 mL injection pen 0.5 mg weekly, Normal      traMADol (Ultram) 50 mg tablet 2 tablets in evening as needed for severe pain relief, Normal      Turmeric (QC TUMERIC COMPLEX PO) Take 1,000 mg by mouth once Tumeric /curcimin, Historical Med      vitamin B-12 (CYANOCOBALAMIN) 100 MCG TABS Take 50 mcg by mouth daily, Historical Med      VITAMIN D PO Take by mouth 2 (two) times a day, Historical Med      vitamin E, tocopherol, 400 units capsule Take 400 Units by mouth 2 (two) times a day, Historical Med      Zinc 50 MG CAPS Take by mouth 2 (two) times a day , Historical Med       !! - Potential duplicate medications found. Please discuss with provider. No discharge procedures on file.     PDMP Review         Value Time User    PDMP Reviewed  Yes 11/22/2023 11:24  Luis Mims DO            ED Provider  Electronically Signed by             Aleja [address]ruff [address], DO  12/04/23 0610

## 2023-12-19 DIAGNOSIS — M79.605 LEFT LEG PAIN: ICD-10-CM

## 2023-12-19 RX ORDER — TRAMADOL HYDROCHLORIDE 50 MG/1
TABLET ORAL
Qty: 60 TABLET | Refills: 0 | Status: SHIPPED | OUTPATIENT
Start: 2023-12-19

## 2024-01-15 ENCOUNTER — APPOINTMENT (EMERGENCY)
Dept: RADIOLOGY | Facility: HOSPITAL | Age: 59
DRG: 605 | End: 2024-01-15
Payer: MEDICARE

## 2024-01-15 ENCOUNTER — HOSPITAL ENCOUNTER (INPATIENT)
Facility: HOSPITAL | Age: 59
LOS: 5 days | Discharge: HOME | DRG: 605 | End: 2024-01-20
Attending: EMERGENCY MEDICINE | Admitting: INTERNAL MEDICINE
Payer: MEDICARE

## 2024-01-15 DIAGNOSIS — E11.22 TYPE 2 DIABETES MELLITUS WITH CHRONIC KIDNEY DISEASE, WITHOUT LONG-TERM CURRENT USE OF INSULIN (HCC): ICD-10-CM

## 2024-01-15 DIAGNOSIS — L03.90 CELLULITIS: ICD-10-CM
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
## 2024-01-15 PROBLEM — L03.119 CELLULITIS OF HAND: Status: ACTIVE | Noted: 2024-01-15

## 2024-01-15 LAB
ALBUMIN SERPL BCP-MCNC: 4.2 G/DL (ref 3.5–5)
ALP SERPL-CCNC: 52 U/L (ref 34–104)
ALT SERPL W P-5'-P-CCNC: 19 U/L (ref 7–52)
ANION GAP SERPL CALCULATED.3IONS-SCNC: 8 MMOL/L
AST SERPL W P-5'-P-CCNC: 15 U/L (ref 13–39)
BASOPHILS # BLD AUTO: 0.04 THOUSANDS/ÂΜL (ref 0–0.1)
BASOPHILS NFR BLD AUTO: 1 % (ref 0–1)
BILIRUB SERPL-MCNC: 0.43 MG/DL (ref 0.2–1)
BUN SERPL-MCNC: 41 MG/DL (ref 5–25)
CALCIUM SERPL-MCNC: 9.7 MG/DL (ref 8.4–10.2)
CHLORIDE SERPL-SCNC: 102 MMOL/L (ref 96–108)
CO2 SERPL-SCNC: 23 MMOL/L (ref 21–32)
CREAT SERPL-MCNC: 1.43 MG/DL (ref 0.6–1.3)
EOSINOPHIL # BLD AUTO: 0.24 THOUSAND/ÂΜL (ref 0–0.61)
EOSINOPHIL NFR BLD AUTO: 4 % (ref 0–6)
ERYTHROCYTE [DISTWIDTH] IN BLOOD BY AUTOMATED COUNT: 13.2 % (ref 11.6–15.1)
GFR SERPL CREATININE-BSD FRML MDRD: 52 ML/MIN/1.73SQ M
GLUCOSE SERPL-MCNC: 156 MG/DL (ref 65–140)
GLUCOSE SERPL-MCNC: 161 MG/DL (ref 65–140)
GLUCOSE SERPL-MCNC: 162 MG/DL (ref 65–140)
HCT VFR BLD AUTO: 38.3 % (ref 36.5–49.3)
HGB BLD-MCNC: 13 G/DL (ref 12–17)
IMM GRANULOCYTES # BLD AUTO: 0.02 THOUSAND/UL (ref 0–0.2)
IMM GRANULOCYTES NFR BLD AUTO: 0 % (ref 0–2)
LYMPHOCYTES # BLD AUTO: 1.04 THOUSANDS/ÂΜL (ref 0.6–4.47)
LYMPHOCYTES NFR BLD AUTO: 15 % (ref 14–44)
MCH RBC QN AUTO: 31.1 PG (ref 26.8–34.3)
MCHC RBC AUTO-ENTMCNC: 33.9 G/DL (ref 31.4–37.4)
MCV RBC AUTO: 92 FL (ref 82–98)
MONOCYTES # BLD AUTO: 0.63 THOUSAND/ÂΜL (ref 0.17–1.22)
MONOCYTES NFR BLD AUTO: 9 % (ref 4–12)
NEUTROPHILS # BLD AUTO: 4.84 THOUSANDS/ÂΜL (ref 1.85–7.62)
NEUTS SEG NFR BLD AUTO: 71 % (ref 43–75)
NRBC BLD AUTO-RTO: 0 /100 WBCS
PLATELET # BLD AUTO: 216 THOUSANDS/UL (ref 149–390)
PMV BLD AUTO: 9.8 FL (ref 8.9–12.7)
POTASSIUM SERPL-SCNC: 4.6 MMOL/L (ref 3.5–5.3)
PROT SERPL-MCNC: 7.5 G/DL (ref 6.4–8.4)
RBC # BLD AUTO: 4.18 MILLION/UL (ref 3.88–5.62)
SODIUM SERPL-SCNC: 133 MMOL/L (ref 135–147)
WBC # BLD AUTO: 6.81 THOUSAND/UL (ref 4.31–10.16)

## 2024-01-15 PROCEDURE — 99285 EMERGENCY DEPT VISIT HI MDM: CPT

## 2024-01-15 PROCEDURE — 80053 COMPREHEN METABOLIC PANEL: CPT

## 2024-01-15 PROCEDURE — 99223 1ST HOSP IP/OBS HIGH 75: CPT | Performed by: INTERNAL MEDICINE

## 2024-01-15 PROCEDURE — 85025 COMPLETE CBC W/AUTO DIFF WBC: CPT

## 2024-01-15 PROCEDURE — 96365 THER/PROPH/DIAG IV INF INIT: CPT

## 2024-01-15 PROCEDURE — 82948 REAGENT STRIP/BLOOD GLUCOSE: CPT

## 2024-01-15 PROCEDURE — 99284 EMERGENCY DEPT VISIT MOD MDM: CPT

## 2024-01-15 PROCEDURE — 73130 X-RAY EXAM OF HAND: CPT

## 2024-01-15 PROCEDURE — 36415 COLL VENOUS BLD VENIPUNCTURE: CPT

## 2024-01-15 RX ORDER — LIDOCAINE HYDROCHLORIDE 10 MG/ML
10 INJECTION, SOLUTION EPIDURAL; INFILTRATION; INTRACAUDAL; PERINEURAL ONCE
Status: COMPLETED | OUTPATIENT
Start: 2024-01-15 | End: 2024-01-15

## 2024-01-15 RX ORDER — HYDROCHLOROTHIAZIDE 12.5 MG/1
12.5 TABLET ORAL 2 TIMES DAILY
Status: DISCONTINUED | OUTPATIENT
Start: 2024-01-15 | End: 2024-01-19

## 2024-01-15 RX ORDER — OXYCODONE HYDROCHLORIDE 10 MG/1
10 TABLET ORAL EVERY 6 HOURS PRN
Status: DISCONTINUED | OUTPATIENT
Start: 2024-01-15 | End: 2024-01-15

## 2024-01-15 RX ORDER — HEPARIN SODIUM 5000 [USP'U]/ML
5000 INJECTION, SOLUTION INTRAVENOUS; SUBCUTANEOUS EVERY 8 HOURS SCHEDULED
Status: DISCONTINUED | OUTPATIENT
Start: 2024-01-15 | End: 2024-01-20 | Stop reason: HOSPADM

## 2024-01-15 RX ORDER — OXYCODONE HYDROCHLORIDE 5 MG/1
5 TABLET ORAL EVERY 6 HOURS PRN
Status: DISCONTINUED | OUTPATIENT
Start: 2024-01-15 | End: 2024-01-16

## 2024-01-15 RX ORDER — LIDOCAINE 50 MG/G
2 PATCH TOPICAL DAILY
Status: DISCONTINUED | OUTPATIENT
Start: 2024-01-15 | End: 2024-01-20 | Stop reason: HOSPADM

## 2024-01-15 RX ORDER — OXYCODONE HYDROCHLORIDE 5 MG/1
5 TABLET ORAL EVERY 6 HOURS PRN
Status: DISCONTINUED | OUTPATIENT
Start: 2024-01-15 | End: 2024-01-15

## 2024-01-15 RX ORDER — MAGNESIUM HYDROXIDE/ALUMINUM HYDROXICE/SIMETHICONE 120; 1200; 1200 MG/30ML; MG/30ML; MG/30ML
30 SUSPENSION ORAL EVERY 6 HOURS PRN
Status: DISCONTINUED | OUTPATIENT
Start: 2024-01-15 | End: 2024-01-20 | Stop reason: HOSPADM

## 2024-01-15 RX ORDER — TRAMADOL HYDROCHLORIDE 50 MG/1
50 TABLET ORAL
Status: DISCONTINUED | OUTPATIENT
Start: 2024-01-15 | End: 2024-01-16

## 2024-01-15 RX ORDER — ONDANSETRON 2 MG/ML
4 INJECTION INTRAMUSCULAR; INTRAVENOUS EVERY 6 HOURS PRN
Status: DISCONTINUED | OUTPATIENT
Start: 2024-01-15 | End: 2024-01-20 | Stop reason: HOSPADM

## 2024-01-15 RX ORDER — ACETAMINOPHEN 325 MG/1
650 TABLET ORAL EVERY 6 HOURS PRN
Status: DISCONTINUED | OUTPATIENT
Start: 2024-01-15 | End: 2024-01-20 | Stop reason: HOSPADM

## 2024-01-15 RX ORDER — CARVEDILOL 25 MG/1
25 TABLET ORAL 2 TIMES DAILY WITH MEALS
Status: DISCONTINUED | OUTPATIENT
Start: 2024-01-15 | End: 2024-01-20 | Stop reason: HOSPADM

## 2024-01-15 RX ORDER — INSULIN LISPRO 100 [IU]/ML
1-6 INJECTION, SOLUTION INTRAVENOUS; SUBCUTANEOUS
Status: DISCONTINUED | OUTPATIENT
Start: 2024-01-15 | End: 2024-01-16

## 2024-01-15 RX ORDER — LISINOPRIL 20 MG/1
20 TABLET ORAL 2 TIMES DAILY
Status: DISCONTINUED | OUTPATIENT
Start: 2024-01-15 | End: 2024-01-19

## 2024-01-15 RX ORDER — SODIUM CHLORIDE 9 MG/ML
100 INJECTION, SOLUTION INTRAVENOUS CONTINUOUS
Status: DISCONTINUED | OUTPATIENT
Start: 2024-01-16 | End: 2024-01-16

## 2024-01-15 RX ADMIN — LIDOCAINE 5% 2 PATCH: 700 PATCH TOPICAL at 16:32

## 2024-01-15 RX ADMIN — SODIUM CHLORIDE 3 G: 9 INJECTION, SOLUTION INTRAVENOUS at 14:04

## 2024-01-15 RX ADMIN — LIDOCAINE HYDROCHLORIDE 10 ML: 10 INJECTION, SOLUTION EPIDURAL; INFILTRATION; INTRACAUDAL at 14:04

## 2024-01-15 RX ADMIN — HYDROCHLOROTHIAZIDE 12.5 MG: 12.5 TABLET ORAL at 16:32

## 2024-01-15 RX ADMIN — AMPICILLIN AND SULBACTAM 3 G: 2; 1 INJECTION, POWDER, FOR SOLUTION INTRAVENOUS at 19:28

## 2024-01-15 RX ADMIN — LISINOPRIL 20 MG: 20 TABLET ORAL at 16:32

## 2024-01-15 RX ADMIN — TRAMADOL HYDROCHLORIDE 50 MG: 50 TABLET, COATED ORAL at 18:23

## 2024-01-15 RX ADMIN — OXYCODONE 5 MG: 5 TABLET ORAL at 21:43

## 2024-01-15 RX ADMIN — CARVEDILOL 25 MG: 25 TABLET, FILM COATED ORAL at 16:32

## 2024-01-15 NOTE — PLAN OF CARE
Problem: INFECTION - ADULT  Goal: Absence or prevention of progression during hospitalization  Description: INTERVENTIONS:  - Assess and monitor for signs and symptoms of infection  - Monitor lab/diagnostic results  - Monitor all insertion sites, i.e. indwelling lines, tubes, and drains  - Monitor endotracheal if appropriate and nasal secretions for changes in amount and color  - [place] appropriate cooling/warming therapies per order  - Administer medications as ordered  - Instruct and encourage patient and family to use good hand hygiene technique  - Identify and instruct in appropriate isolation precautions for identified infection/condition  Outcome: Progressing  Goal: Absence of fever/infection during neutropenic period  Description: INTERVENTIONS:  - Monitor WBC    Outcome: Progressing     Problem: SKIN/TISSUE INTEGRITY - ADULT  Goal: Skin Integrity remains intact(Skin Breakdown Prevention)  Description: Assess:  -Perform Malachi assessment every .  -Clean and moisturize skin every .  -Inspect skin when repositioning, toileting, and assisting with ADLS  -Assess under medical devices such as . every .  -Assess extremities for adequate circulation and sensation     Bed Management:  -Have minimal linens on bed & keep smooth, unwrinkled  -Change linens as needed when moist or perspiring  -Avoid sitting or lying in one position for more than . hours while in bed  -Keep HOB at .degrees     Toileting:  -Offer bedside commode  -Assess for incontinence every .  -Use incontinent care products after each incontinent episode such as .    Activity:  -Mobilize patient . times a day  -Encourage activity and walks on unit  -Encourage or provide ROM exercises   -Turn and reposition patient every . Hours  -Use appropriate equipment to lift or move patient in bed  -Instruct/ Assist with weight shifting every . when out of bed in chair  -Consider limitation of chair time . hour intervals    Skin Care:  -Avoid use of baby  powder, tape, friction and shearing, hot water or constrictive clothing  -Relieve pressure over bony prominences using .  -Do not massage red bony areas    Next Steps:  -Teach patient strategies to minimize risks such as .   -Consider consults to  interdisciplinary teams such as .  Outcome: Progressing  Goal: Incision(s), wounds(s) or drain site(s) healing without S/S of infection  Description: INTERVENTIONS  - Assess and document dressing, incision, wound bed, drain sites and surrounding tissue  - Provide patient and family education  - Perform skin care/dressing changes every .  Outcome: Progressing  Goal: Pressure injury heals and does not worsen  Description: Interventions:  - Implement low air loss mattress or specialty surface (Criteria met)  - Apply silicone foam dressing  - Instruct/assist with weight shifting every .. minutes when in chair   - Limit chair time to . hour intervals  - Use special pressure reducing interventions such as . when in chair   - Apply fecal or urinary incontinence containment device   - Perform passive or active ROM every .  - Turn and reposition patient & offload bony prominences every . hours   - Utilize friction reducing device or surface for transfers   - Consider consults to  interdisciplinary teams such as .  - Use incontinent care products after each incontinent episode such as .  - Consider nutrition services referral as needed  Outcome: Progressing

## 2024-01-15 NOTE — H&P
"[facility]  H&P  Name: Yoni Castillo 58 y.o. male I MRN: [id number]  Unit/Bed#: ED-02 I Date of Admission: 1/15/2024   Date of Service: 1/15/2024 I Hospital Day: 0      Assessment/Plan   * Cellulitis of hand  Assessment & Plan  Patient presents with cellulitis, swelling, drainage from left upper extremity bite wound  He was bit by his hunt retriever on Saturday.  Wound worsened over the weekend and developed increased swelling and erythema and pain  Case is discussed with hand surgery.  No available beds so patient is okay to be admitted at [facility].  Recommendation to open puncture wounds at the bedside which was performed by emergency medicine.  3 times daily soaks with warm soapy water plus IV antibiotics.  To evaluate in person 1/16/2024  Continue IV Unasyn  N.p.o. at midnight    Continuous opioid dependence (HCC)  Assessment & Plan  Utilizes tramadol, reviewed PDMP, no red flags    Chronic kidney disease, stage 3 (Formerly [facility])  Assessment & Plan  Lab Results   Component Value Date    EGFR 52 01/15/2024    EGFR 64 12/04/2023    EGFR 56 11/30/2023    CREATININE 1.43 (H) 01/15/2024    CREATININE 1.21 12/04/2023    CREATININE 1.36 (H) 11/30/2023   Currently at baseline    Lumbar radiculopathy  Assessment & Plan  Chronically maintained on tramadol  As needed analgesia while inpatient    Type 2 diabetes mellitus with chronic kidney disease, without long-term current use of insulin ([facility])  Assessment & Plan  Lab Results   Component Value Date    HGBA1C 6.7 (H) 11/28/2023       No results for input(s): \"POCGLU\" in the last 72 hours.    Blood Sugar Average: Last 72 hrs:    Sliding scale insulin with meals    Essential hypertension  Assessment & Plan  Continue home medications with hold parameters         VTE Prophylaxis: Heparin  Code Status: Level 3 DNR/DNI    Anticipated Length of Stay:  Patient will be admitted on an Inpatient basis with an anticipated length of stay of greater than 2 " midnights.   Justification for Hospital Stay: Need for IV antibiotics, inpatient procedure    Total Time for Visit, including Counseling / Coordination of Care: I have spent a total time of 65 minutes on 01/15/24 in caring for this patient including Diagnostic results, Prognosis, Patient and family education, Impressions, Counseling / Coordination of care, Documenting in the medical record, Reviewing / ordering tests, medicine, procedures  , and Communicating with other healthcare professionals .    Chief Complaint:   Dog bite    History of Present Illness:    Yoni Castillo is a 58 y.o. male With past medical history of hypertension, non-insulin-dependent type 2 diabetes, ckd, chronic pain who presents with left hand erythema and swelling following a dog bite 2 days ago.  Per patient his dog is up to date on vaccinations.  Most recent tetanus in 2022.  He did feel some fevers and chills but no recorded fevers at home.    Review of Systems:    Review of Systems   Constitutional:  Positive for chills and fever.   HENT:  Negative for trouble swallowing.    Respiratory:  Negative for shortness of breath and wheezing.    Cardiovascular:  Negative for chest pain and palpitations.   Gastrointestinal:  Negative for constipation, diarrhea, nausea and vomiting.   Musculoskeletal:  Positive for arthralgias and myalgias.   Skin:  Positive for wound. Negative for rash.   Neurological:  Negative for dizziness, light-headedness and headaches.       Past Medical and Surgical History:     Past Medical History:   Diagnosis Date    Chronic pain disorder     Diabetes mellitus (HCC)     H/O eye surgery     High blood sugar     Hypertension     Liver disease        Past Surgical History:   Procedure Laterality Date    HERNIA REPAIR      KIDNEY SURGERY      MOUTH SURGERY      UT AMPUTATION METATARSAL W/TOE SINGLE Left 6/1/2022    Procedure: RAY RESECTION FOOT;  Surgeon: Hannah Melgoza DPM;  [facility];  Service: Podiatry    UT  RPR AA HERNIA 1ST < 3 CM REDUCIBLE N/A 7/14/2023    Procedure: REPAIR HERNIA INCISIONAL;  Surgeon: Matt Melo MD;  [facility];  Service: General    WOUND DEBRIDEMENT Left 4/28/2022    Procedure: Left foot washout;  Surgeon: Hannah Melgoza DPM;  [facility];  Service: Podiatry    WOUND DEBRIDEMENT Left 6/6/2022    Procedure: DEBRIDEMENT WOUND (WASH OUT);  Surgeon: Hannah Melgoza DPM;  [facility];  Service: Podiatry       Meds/Allergies:    Prior to Admission medications    Medication Sig Start Date End Date Taking? Authorizing Provider   Accu-Chek FastClix Lancets MISC Use to test blood sugar once a day 6/5/23   Juanita Lowe MD   Alpha-Lipoic Acid 600 MG CAPS Take 600 mg by mouth 2 (two) times a day      Historical Provider, MD   Apple Cider Vinegar 300 MG TABS Take 300 mg by mouth daily      Historical Provider, MD   Ascorbic Acid (vitamin C) 1000 MG tablet Take 1,000 mg by mouth 2 (two) times a day 2 tablet twice a day    Historical Provider, MD   BENFOTIAMINE PO  6/1/23   Historical Provider, MD   beta carotene 30 MG capsule Take 30 mg by mouth 2 (two) times a day      Historical Provider, MD   Blood Glucose Monitoring Suppl (Accu-Chek Guide Me) w/Device KIT Use to check blood sugar once a day 1/4/23   Angelito Christine MD   carvedilol (COREG) 25 mg tablet Take 1 tablet (25 mg total) by mouth 2 (two) times a day with meals 8/23/23   Judd Ji MD   Chromium Picolinate 200 MCG TABS Take by mouth    Historical Provider, MD   Empagliflozin (Jardiance) 25 MG TABS Take 1 tablet (25 mg total) by mouth daily 10/16/23   Juanita Lowe MD   Garlic 1200 MG CAPS Take by mouth 2 (two) times a day     Historical Provider, MD   glucose blood (Accu-Chek Guide) test strip Use to check blood sugar once a day 6/5/23   Juanita Lowe MD   IRON, FERROUS SULFATE, PO Take by mouth    Historical Provider, MD   ketorolac (ACULAR) 0.5 % ophthalmic solution  8/22/23   Historical Provider, MD    lidocaine (Lidoderm) 5 % Apply 1 patch topically daily Remove & Discard patch within 12 hours or as directed by MD 7/29/22   Umesh Thrasher PA-C   lisinopril-hydrochlorothiazide (PRINZIDE,ZESTORETIC) 20-12.5 MG per tablet Take 1 tablet by mouth 2 (two) times a day 8/23/23   Judd Ji MD   Lutein-Bilberry (LUTEIN PLUS BILBERRY PO) Take by mouth    Historical Provider, MD   Pyridoxine HCl (B-6 PO) Take by mouth    Historical Provider, MD   semaglutide, 0.25 or 0.5 mg/dose, (Ozempic, 0.25 or 0.5 MG/DOSE,) 2 mg/1.5 mL injection pen 0.5 mg weekly  Patient taking differently: Inject 0.5 mg under the skin every 7 days 0.5 mg weekly 11/7/23   Marissa Meyer MD   traMADol (Ultram) 50 mg tablet 2 tablets in evening as needed for severe pain relief 12/19/23   Judd Ji MD   Turmeric (QC TUMERIC COMPLEX PO) Take 1,000 mg by mouth once Tumeric /curcimin    Historical Provider, MD   vitamin B-12 (CYANOCOBALAMIN) 100 MCG TABS Take 50 mcg by mouth daily    Historical Provider, MD   VITAMIN D PO Take by mouth 2 (two) times a day    Historical Provider, MD   vitamin E, tocopherol, 400 units capsule Take 400 Units by mouth 2 (two) times a day    Historical Provider, MD   Zinc 50 MG CAPS Take by mouth 2 (two) times a day     Historical Provider, MD       Allergies:   Allergies   Allergen Reactions    Cephalexin Hives     Skin peeling  Tolerates penicillins (tolerated unasyn and zosyn)    Metformin GI Intolerance       Social History:     Marital Status: Registered Domestic Partner   Substance Use History:   Social History     Substance and Sexual Activity   Alcohol Use Yes    Comment: ocassional     Social History     Tobacco Use   Smoking Status Never   Smokeless Tobacco Never     Social History     Substance and Sexual Activity   Drug Use Never       Family History:    Pertinent family history reviewed    Physical Exam:     Vitals:   Blood Pressure: 153/90 (01/15/24 1328)  Pulse: 82 (01/15/24  1328)  Temperature: 97.9 °F (36.6 °C) (01/15/24 1328)  Respirations: 18 (01/15/24 1328)  Weight - Scale: 123 kg (270 lb 8.1 oz) (01/15/24 1325)  SpO2: 99 % (01/15/24 1328)    Physical Exam  Vitals reviewed.   Constitutional:       General: He is not in acute distress.     Appearance: He is well-developed. He is not ill-appearing, toxic-appearing or diaphoretic.   HENT:      Head: Normocephalic and atraumatic.      Mouth/Throat:      Mouth: Mucous membranes are moist.   Eyes:      General: No scleral icterus.     Extraocular Movements: Extraocular movements intact.   Cardiovascular:      Rate and Rhythm: Normal rate and regular rhythm.      Heart sounds: Normal heart sounds.   Pulmonary:      Effort: Pulmonary effort is normal. No respiratory distress.      Breath sounds: Normal breath sounds. No wheezing or rales.   Abdominal:      General: There is no distension.      Palpations: Abdomen is soft.      Tenderness: There is no abdominal tenderness. There is no guarding or rebound.   Musculoskeletal:      Comments: Swelling and erythema of left upper extremity   Skin:     General: Skin is warm and dry.   Neurological:      General: No focal deficit present.      Mental Status: He is alert. Mental status is at baseline.   Psychiatric:         Mood and Affect: Mood normal.         Behavior: Behavior normal.         Thought Content: Thought content normal.         Judgment: Judgment normal.          Additional Data:     Lab Results: I have reviewed pertinent results     Results from last 7 days   Lab Units 01/15/24  1402   WBC Thousand/uL 6.81   HEMOGLOBIN g/dL 13.0   HEMATOCRIT % 38.3   PLATELETS Thousands/uL 216   NEUTROS PCT % 71   LYMPHS PCT % 15   MONOS PCT % 9   EOS PCT % 4     Results from last 7 days   Lab Units 01/15/24  1402   SODIUM mmol/L 133*   POTASSIUM mmol/L 4.6   CHLORIDE mmol/L 102   CO2 mmol/L 23   BUN mg/dL 41*   CREATININE mg/dL 1.43*   ANION GAP mmol/L 8   CALCIUM mg/dL 9.7   ALBUMIN g/dL 4.2    TOTAL BILIRUBIN mg/dL 0.43   ALK PHOS U/L 52   ALT U/L 19   AST U/L 15   GLUCOSE RANDOM mg/dL 162*                       Imaging: I have reviewed pertinent imaging     XR hand 3+ views LEFT    (Results Pending)       Allscripts / Epic Records Reviewed    ** Please Note: This note has been constructed using a voice recognition system. **

## 2024-01-15 NOTE — ED PROVIDER NOTES
History  Chief Complaint   Patient presents with    Dog Bite     Dog bite to left hand. Injury occurred 2 days ago. UTD on vaccines.      58 YOM with PMH HTN and DM presents with a dog bite from about 2 days ago. Redness and swelling spreading. Also draining purulent liquid. Pt unable to make a full fist due to the pain/tightness. Reports he has been getting chills at night. No recorded fever. Pt reports he broke up a dog fight between his dogs- they are up to date on vaccinations. Pt reports he is up to date on tetanus- confirmed in 2022.         Prior to Admission Medications   Prescriptions Last Dose Informant Patient Reported? Taking?   Accu-Chek FastClix Lancets MISC  Self No No   Sig: Use to test blood sugar once a day   Alpha-Lipoic Acid 600 MG CAPS  Self Yes No   Sig: Take 600 mg by mouth 2 (two) times a day     Apple Cider Vinegar 300 MG TABS  Self Yes No   Sig: Take 300 mg by mouth daily     Ascorbic Acid (vitamin C) 1000 MG tablet  Self Yes No   Sig: Take 1,000 mg by mouth 2 (two) times a day 2 tablet twice a day   BENFOTIAMINE PO  Self Yes No   Blood Glucose Monitoring Suppl (Accu-Chek Guide Me) w/Device KIT  Self No No   Sig: Use to check blood sugar once a day   Chromium Picolinate 200 MCG TABS  Self Yes No   Sig: Take by mouth   Empagliflozin (Jardiance) 25 MG TABS   No No   Sig: Take 1 tablet (25 mg total) by mouth daily   Garlic 1200 MG CAPS  Self Yes No   Sig: Take by mouth 2 (two) times a day    IRON, FERROUS SULFATE, PO  Self Yes No   Sig: Take by mouth   Lutein-Bilberry (LUTEIN PLUS BILBERRY PO)  Self Yes No   Sig: Take by mouth   Pyridoxine HCl (B-6 PO)  Self Yes No   Sig: Take by mouth   Turmeric (QC TUMERIC COMPLEX PO)  Self Yes No   Sig: Take 1,000 mg by mouth once Tumeric /curcimin   VITAMIN D PO  Self Yes No   Sig: Take by mouth 2 (two) times a day   Zinc 50 MG CAPS  Self Yes No   Sig: Take by mouth 2 (two) times a day    beta carotene 30 MG capsule  Self Yes No   Sig: Take 30 mg by mouth  2 (two) times a day     carvedilol (COREG) 25 mg tablet  Self No No   Sig: Take 1 tablet (25 mg total) by mouth 2 (two) times a day with meals   glucose blood (Accu-Chek Guide) test strip  Self No No   Sig: Use to check blood sugar once a day   ketorolac (ACULAR) 0.5 % ophthalmic solution  Self Yes No   lidocaine (Lidoderm) 5 %  Self No No   Sig: Apply 1 patch topically daily Remove & Discard patch within 12 hours or as directed by MD   lisinopril-hydrochlorothiazide (PRINZIDE,ZESTORETIC) 20-12.5 MG per tablet  Self No No   Sig: Take 1 tablet by mouth 2 (two) times a day   semaglutide, 0.25 or 0.5 mg/dose, (Ozempic, 0.25 or 0.5 MG/DOSE,) 2 mg/1.5 mL injection pen   No No   Si.5 mg weekly   Patient taking differently: Inject 0.5 mg under the skin every 7 days 0.5 mg weekly   traMADol (Ultram) 50 mg tablet   No No   Si tablets in evening as needed for severe pain relief   vitamin B-12 (CYANOCOBALAMIN) 100 MCG TABS  Self Yes No   Sig: Take 50 mcg by mouth daily   vitamin E, tocopherol, 400 units capsule  Self Yes No   Sig: Take 400 Units by mouth 2 (two) times a day      Facility-Administered Medications: None       Past Medical History:   Diagnosis Date    Chronic pain disorder     Diabetes mellitus (HCC)     H/O eye surgery     High blood sugar     Hypertension     Liver disease        Past Surgical History:   Procedure Laterality Date    HERNIA REPAIR      KIDNEY SURGERY      MOUTH SURGERY      IL AMPUTATION METATARSAL W/TOE SINGLE Left 2022    Procedure: RAY RESECTION FOOT;  Surgeon: Hannah Melgoza DPM;  [facility];  Service: Podiatry    IL RPR AA HERNIA 1ST < 3 CM REDUCIBLE N/A 2023    Procedure: REPAIR HERNIA INCISIONAL;  Surgeon: Matt Melo MD;  [facility];  Service: General    WOUND DEBRIDEMENT Left 2022    Procedure: Left foot washout;  Surgeon: Hannah Melgoza DPM;  [facility];  Service: Podiatry    WOUND DEBRIDEMENT Left 2022    Procedure:  DEBRIDEMENT WOUND (WASH OUT);  Surgeon: Hannah Melgoza DPM;  [facility];  Service: Podiatry       History reviewed. No pertinent family history.  I have reviewed and agree with the history as documented.    E-Cigarette/Vaping    E-Cigarette Use Never User      E-Cigarette/Vaping Substances    Nicotine No     THC No     CBD No     Flavoring No     Other No     Unknown No      Social History     Tobacco Use    Smoking status: Never    Smokeless tobacco: Never   Vaping Use    Vaping status: Never Used   Substance Use Topics    Alcohol use: Yes     Comment: ocassional    Drug use: Never       Review of Systems   Skin:  Positive for wound.   All other systems reviewed and are negative.      Physical Exam  Physical Exam  Vitals and nursing note reviewed.   Constitutional:       General: He is not in acute distress.     Appearance: Normal appearance. He is well-developed. He is not ill-appearing.   HENT:      Head: Normocephalic and atraumatic.   Eyes:      Conjunctiva/sclera: Conjunctivae normal.   Cardiovascular:      Rate and Rhythm: Normal rate.   Pulmonary:      Effort: Pulmonary effort is normal.   Abdominal:      Palpations: Abdomen is soft.   Musculoskeletal:         General: Normal range of motion.      Cervical back: Normal range of motion and neck supple.   Skin:     General: Skin is warm and dry.      Capillary Refill: Capillary refill takes less than 2 seconds.      Comments: Wound on left hand: see picture below. Purulent drainage expressed from wound on posterior aspect of hand   Neurological:      Mental Status: He is alert.   Psychiatric:         Mood and Affect: Mood normal.         Behavior: Behavior normal.                 Vital Signs  ED Triage Vitals [01/15/24 1328]   Temperature Pulse Respirations Blood Pressure SpO2   97.9 °F (36.6 °C) 82 18 153/90 99 %      Temp src Heart Rate Source Patient Position - Orthostatic VS BP Location FiO2 (%)   -- -- -- -- --      Pain Score       --            Vitals:    01/15/24 1328   BP: 153/90   Pulse: 82         Visual Acuity      ED Medications  Medications   lidocaine (PF) (XYLOCAINE-MPF) 1 % injection 10 mL (10 mL Infiltration Given 1/15/24 1404)   ampicillin-sulbactam (UNASYN) 3 g in sodium chloride 0.9 % 100 mL IVPB (3 g Intravenous New Bag 1/15/24 1404)       Diagnostic Studies  Results Reviewed       Procedure Component Value Units Date/Time    Comprehensive metabolic panel [[id number]]  (Abnormal) Collected: 01/15/24 1402    Lab Status: Final result Specimen: Blood from Arm, Right Updated: 01/15/24 1427     Sodium 133 mmol/L      Potassium 4.6 mmol/L      Chloride 102 mmol/L      CO2 23 mmol/L      ANION GAP 8 mmol/L      BUN 41 mg/dL      Creatinine 1.43 mg/dL      Glucose 162 mg/dL      Calcium 9.7 mg/dL      AST 15 U/L      ALT 19 U/L      Alkaline Phosphatase 52 U/L      Total Protein 7.5 g/dL      Albumin 4.2 g/dL      Total Bilirubin 0.43 mg/dL      eGFR 52 ml/min/1.73sq m     Narrative:      National Kidney Disease Foundation guidelines for Chronic Kidney Disease (CKD):     Stage 1 with normal or high GFR (GFR > 90 mL/min/1.73 square meters)    Stage 2 Mild CKD (GFR = 60-89 mL/min/1.73 square meters)    Stage 3A Moderate CKD (GFR = 45-59 mL/min/1.73 square meters)    Stage 3B Moderate CKD (GFR = 30-44 mL/min/1.73 square meters)    Stage 4 Severe CKD (GFR = 15-29 mL/min/1.73 square meters)    Stage 5 End Stage CKD (GFR <15 mL/min/1.73 square meters)  Note: GFR calculation is accurate only with a steady state creatinine    CBC and differential [[id number]] Collected: 01/15/24 1402    Lab Status: Final result Specimen: Blood from Arm, Right Updated: 01/15/24 1409     WBC 6.81 Thousand/uL      RBC 4.18 Million/uL      Hemoglobin 13.0 g/dL      Hematocrit 38.3 %      MCV 92 fL      MCH 31.1 pg      MCHC 33.9 g/dL      RDW 13.2 %      MPV 9.8 fL      Platelets 216 Thousands/uL      nRBC 0 /100 WBCs      Neutrophils Relative 71 %      Immat GRANS % 0 %   "    Lymphocytes Relative 15 %      Monocytes Relative 9 %      Eosinophils Relative 4 %      Basophils Relative 1 %      Neutrophils Absolute 4.84 Thousands/µL      Immature Grans Absolute 0.02 Thousand/uL      Lymphocytes Absolute 1.04 Thousands/µL      Monocytes Absolute 0.63 Thousand/µL      Eosinophils Absolute 0.24 Thousand/µL      Basophils Absolute 0.04 Thousands/µL                    XR hand 3+ views LEFT    (Results Pending)              Procedures  Procedures         ED Course  ED Course as of 01/15/24 1440   Mon Rafael 15, 2024   1347 TT to hand surgery    1356 Dr. Mccarthy- everywhere is full so he will have to stay here. Reccomends opening the puncture wounds to let them drain better, start 3x daily soaks in warm soapy water and IV abx. Will admit here and hand surgery will round on him tomorrow.    1438 Wounds were injected with lidocaine and pucture wounds opened up more. Purulent drainage expressed from wound on posterior hand                                             Medical Decision Making  58 YOM with PMH HTN and DM presents with a dog bite from about 2 days ago. Redness and swelling spreading. Also draining purulent liquid. cannot make a fist due to pain and \"tightness\". pictures are in his chart. CBC showed no anemia. CMP showed slightly worsening creatinine with elevated glucose.  Xray was normal. Discussed with hand surgery and they said everywhere is full so pt will have to stay here and they will round on him tomorrow. In the meantime they recommended opening up the puncture wounds- which I did, warm soapy water soaks three times per day and IV abx. Pt was admitted to [facility] in stable condition under Dr. Morgan.       Problems Addressed:  Cellulitis: acute illness or injury  Dog bite, initial encounter: acute illness or injury  Type 2 diabetes mellitus with chronic kidney disease, without long-term current use of insulin (HCC): chronic illness or injury    Amount and/or Complexity of Data " Reviewed  Labs: ordered.  Radiology: ordered.    Risk  Prescription drug management.  Decision regarding hospitalization.             Disposition  Final diagnoses:   Dog bite, initial encounter   Cellulitis   Type 2 diabetes mellitus with chronic kidney disease, without long-term current use of insulin ([facility])     Time reflects when diagnosis was documented in both MDM as applicable and the Disposition within this note       Time User Action Codes Description Comment    1/15/2024  1:58 PM Anna MarieHeidy porterilen Add [W54.0XXA] Dog bite, initial encounter     1/15/2024  1:58 PM Anna Marie Kailen Add [L03.90] Cellulitis     1/15/2024  1:58 PM Anna Marie, Kailen Add [L03.115] Cellulitis of right foot     1/15/2024  1:58 PM Anna MarieHeidy porterilen Add [S91.331A] Nail wound of right foot, initial encounter     1/15/2024  1:58 PM Anna MarieFrancisca portern Add [M72.0] Dupuytren's disease of finger with nodules without contracture     1/15/2024  1:58 PM Anna MarieHeidy porterilen Add [Z89.422] Acquired absence of other left toe(s) (HCC)     1/15/2024  1:58 PM Anna Marie, Kailen Add [K43.2] Incisional hernia without obstruction or gangrene     1/15/2024  1:58 PM Anna Marie, Kailen Add [K43.9] Ventral hernia without obstruction or gangrene     1/15/2024  1:58 PM Anna MarieHeidyilen Add [M19.90] Arthritis     1/15/2024  1:58 PM Anna Marie, Kailen Add [F11.20] Continuous opioid dependence (HCC)     1/15/2024  1:58 PM Anna Marie, Kailen Add [Z86.31] H/O diabetic foot ulcer     1/15/2024  1:58 PM Anna Marie, Kailen Add [E78.5] Dyslipidemia     1/15/2024  1:58 PM Anna Marie, Kailen Add [G62.9] Neuropathy     1/15/2024  1:58 PM Anna Marie, Kailen Add [M54.12] Cervical radiculopathy     1/15/2024  1:58 PM Anna Marie, Kailen Add [M54.16] Lumbar radiculopathy     1/15/2024  1:58 PM Umesh Thrasher Add [I10] Essential hypertension     1/15/2024  2:36 PM Umesh Thrasher Remove [L03.115] Cellulitis of right foot     1/15/2024  2:36 PM Umesh Thrasher Remove [S91.331A] Nail wound of right foot, initial encounter     1/15/2024  2:36 PM Anna Marie,  Kailen Remove [M72.0] Dupuytren's disease of finger with nodules without contracture     1/15/2024  2:36 PM Anna Marie, Kailen Remove [Z89.422] Acquired absence of other left toe(s) (HCC)     1/15/2024  2:36 PM Anna Marie, Kailen Remove [K43.2] Incisional hernia without obstruction or gangrene     1/15/2024  2:36 PM Anna Marie, Kailen Remove [K43.9] Ventral hernia without obstruction or gangrene     1/15/2024  2:36 PM Anna Marie, Kailen Remove [M19.90] Arthritis     1/15/2024  2:36 PM Anna Marie, Kailen Remove [F11.20] Continuous opioid dependence (HCC)     1/15/2024  2:36 PM Anna Marie, Kailen Remove [Z86.31] H/O diabetic foot ulcer     1/15/2024  2:36 PM Anna Marie, Kailen Remove [E78.5] Dyslipidemia     1/15/2024  2:36 PM Anna Marie, Kailen Remove [G62.9] Neuropathy     1/15/2024  2:36 PM Anna Marie, Kailen Remove [M54.12] Cervical radiculopathy     1/15/2024  2:36 PM Anna Marie, Kailen Remove [M54.16] Lumbar radiculopathy     1/15/2024  2:36 PM Anna Marie, Kailen Remove [I10] Essential hypertension     1/15/2024  2:36 PM Anna Marie, Kailen Add [E11.22] Type 2 diabetes mellitus with chronic kidney disease, without long-term current use of insulin ([facility])           ED Disposition       ED Disposition   Admit    Condition   Stable    Date/Time   Mon Rafael 15, 2024  2:37 PM    Comment   Case was discussed with LYLY and the patient's admission status was agreed to be Admission Status: inpatient status to the service of Dr. Morgan .               Follow-up Information    None         Patient's Medications   Discharge Prescriptions    No medications on file       No discharge procedures on file.    PDMP Review         Value Time User    PDMP Reviewed  Yes 12/19/2023  3:21 PM Judd Ji MD            ED Provider  Electronically Signed by             Umesh Thrasher PA-C  01/15/24 2358

## 2024-01-15 NOTE — ASSESSMENT & PLAN NOTE
Patient presents with cellulitis, swelling, drainage from left upper extremity bite wound  He was bit by his hunt retriever on Saturday.  Wound worsened over the weekend and developed increased swelling and erythema and pain  Case is discussed with hand surgery.  No available beds so patient is okay to be admitted at [facility].  Recommendation to open puncture wounds at the bedside which was performed by emergency medicine.  3 times daily soaks with warm soapy water plus IV antibiotics.  To evaluate in person 1/16/2024  Continue IV Unasyn  N.p.o. at midnight   Spiral Flap Text: The defect edges were debeveled with a #15 scalpel blade.  Given the location of the defect, shape of the defect and the proximity to free margins a spiral flap was deemed most appropriate.  Using a sterile surgical marker, an appropriate rotation flap was drawn incorporating the defect and placing the expected incisions within the relaxed skin tension lines where possible. The area thus outlined was incised deep to adipose tissue with a #15 scalpel blade.  The skin margins were undermined to an appropriate distance in all directions utilizing iris scissors and carried over to close the primary defect.

## 2024-01-15 NOTE — ASSESSMENT & PLAN NOTE
"Lab Results   Component Value Date    HGBA1C 6.7 (H) 11/28/2023       No results for input(s): \"POCGLU\" in the last 72 hours.    Blood Sugar Average: Last 72 hrs:    Sliding scale insulin with meals  "

## 2024-01-15 NOTE — ASSESSMENT & PLAN NOTE
Lab Results   Component Value Date    EGFR 52 01/15/2024    EGFR 64 12/04/2023    EGFR 56 11/30/2023    CREATININE 1.43 (H) 01/15/2024    CREATININE 1.21 12/04/2023    CREATININE 1.36 (H) 11/30/2023   Currently at baseline

## 2024-01-16 LAB
ANION GAP SERPL CALCULATED.3IONS-SCNC: 8 MMOL/L
BUN SERPL-MCNC: 41 MG/DL (ref 5–25)
CALCIUM SERPL-MCNC: 9.1 MG/DL (ref 8.4–10.2)
CHLORIDE SERPL-SCNC: 105 MMOL/L (ref 96–108)
CO2 SERPL-SCNC: 21 MMOL/L (ref 21–32)
CREAT SERPL-MCNC: 1.19 MG/DL (ref 0.6–1.3)
ERYTHROCYTE [DISTWIDTH] IN BLOOD BY AUTOMATED COUNT: 13.3 % (ref 11.6–15.1)
GFR SERPL CREATININE-BSD FRML MDRD: 65 ML/MIN/1.73SQ M
GLUCOSE SERPL-MCNC: 118 MG/DL (ref 65–140)
GLUCOSE SERPL-MCNC: 127 MG/DL (ref 65–140)
GLUCOSE SERPL-MCNC: 164 MG/DL (ref 65–140)
GLUCOSE SERPL-MCNC: 176 MG/DL (ref 65–140)
GLUCOSE SERPL-MCNC: 218 MG/DL (ref 65–140)
HCT VFR BLD AUTO: 38.4 % (ref 36.5–49.3)
HGB BLD-MCNC: 12.8 G/DL (ref 12–17)
MCH RBC QN AUTO: 30.8 PG (ref 26.8–34.3)
MCHC RBC AUTO-ENTMCNC: 33.3 G/DL (ref 31.4–37.4)
MCV RBC AUTO: 93 FL (ref 82–98)
PLATELET # BLD AUTO: 221 THOUSANDS/UL (ref 149–390)
PMV BLD AUTO: 9.9 FL (ref 8.9–12.7)
POTASSIUM SERPL-SCNC: 4.2 MMOL/L (ref 3.5–5.3)
RBC # BLD AUTO: 4.15 MILLION/UL (ref 3.88–5.62)
SODIUM SERPL-SCNC: 134 MMOL/L (ref 135–147)
WBC # BLD AUTO: 5.81 THOUSAND/UL (ref 4.31–10.16)

## 2024-01-16 PROCEDURE — 99222 1ST HOSP IP/OBS MODERATE 55: CPT | Performed by: ORTHOPAEDIC SURGERY

## 2024-01-16 PROCEDURE — 99232 SBSQ HOSP IP/OBS MODERATE 35: CPT | Performed by: PHYSICIAN ASSISTANT

## 2024-01-16 PROCEDURE — 80048 BASIC METABOLIC PNL TOTAL CA: CPT | Performed by: INTERNAL MEDICINE

## 2024-01-16 PROCEDURE — 10140 I&D HMTMA SEROMA/FLUID COLLJ: CPT | Performed by: ORTHOPAEDIC SURGERY

## 2024-01-16 PROCEDURE — 0J9K0ZZ DRAINAGE OF LEFT HAND SUBCUTANEOUS TISSUE AND FASCIA, OPEN APPROACH: ICD-10-PCS | Performed by: ORTHOPAEDIC SURGERY

## 2024-01-16 PROCEDURE — 85027 COMPLETE CBC AUTOMATED: CPT | Performed by: INTERNAL MEDICINE

## 2024-01-16 PROCEDURE — 82948 REAGENT STRIP/BLOOD GLUCOSE: CPT

## 2024-01-16 RX ORDER — INSULIN LISPRO 100 [IU]/ML
1-6 INJECTION, SOLUTION INTRAVENOUS; SUBCUTANEOUS EVERY 6 HOURS
Status: DISCONTINUED | OUTPATIENT
Start: 2024-01-16 | End: 2024-01-16

## 2024-01-16 RX ORDER — OXYCODONE HYDROCHLORIDE 10 MG/1
10 TABLET ORAL EVERY 4 HOURS PRN
Status: DISCONTINUED | OUTPATIENT
Start: 2024-01-16 | End: 2024-01-20 | Stop reason: HOSPADM

## 2024-01-16 RX ORDER — LIDOCAINE HYDROCHLORIDE 10 MG/ML
10 INJECTION, SOLUTION EPIDURAL; INFILTRATION; INTRACAUDAL; PERINEURAL ONCE
Status: COMPLETED | OUTPATIENT
Start: 2024-01-16 | End: 2024-01-16

## 2024-01-16 RX ORDER — INSULIN LISPRO 100 [IU]/ML
1-6 INJECTION, SOLUTION INTRAVENOUS; SUBCUTANEOUS EVERY 6 HOURS
Status: DISCONTINUED | OUTPATIENT
Start: 2024-01-16 | End: 2024-01-20 | Stop reason: HOSPADM

## 2024-01-16 RX ORDER — OXYCODONE HYDROCHLORIDE 5 MG/1
5 TABLET ORAL EVERY 6 HOURS PRN
Status: DISCONTINUED | OUTPATIENT
Start: 2024-01-16 | End: 2024-01-20 | Stop reason: HOSPADM

## 2024-01-16 RX ORDER — KETOROLAC TROMETHAMINE 5 MG/ML
1 SOLUTION OPHTHALMIC 4 TIMES DAILY PRN
Status: DISCONTINUED | OUTPATIENT
Start: 2024-01-16 | End: 2024-01-20 | Stop reason: HOSPADM

## 2024-01-16 RX ADMIN — KETOROLAC TROMETHAMINE 1 DROP: 5 SOLUTION OPHTHALMIC at 18:48

## 2024-01-16 RX ADMIN — AMPICILLIN AND SULBACTAM 3 G: 2; 1 INJECTION, POWDER, FOR SOLUTION INTRAVENOUS at 02:14

## 2024-01-16 RX ADMIN — OXYCODONE HYDROCHLORIDE 10 MG: 10 TABLET ORAL at 09:44

## 2024-01-16 RX ADMIN — LISINOPRIL 20 MG: 20 TABLET ORAL at 15:31

## 2024-01-16 RX ADMIN — OXYCODONE HYDROCHLORIDE 10 MG: 10 TABLET ORAL at 15:31

## 2024-01-16 RX ADMIN — LIDOCAINE 5% 2 PATCH: 700 PATCH TOPICAL at 08:27

## 2024-01-16 RX ADMIN — SODIUM CHLORIDE 100 ML/HR: 0.9 INJECTION, SOLUTION INTRAVENOUS at 00:11

## 2024-01-16 RX ADMIN — OXYCODONE 5 MG: 5 TABLET ORAL at 04:34

## 2024-01-16 RX ADMIN — LIDOCAINE HYDROCHLORIDE 10 ML: 10 INJECTION, SOLUTION EPIDURAL; INFILTRATION; INTRACAUDAL at 09:04

## 2024-01-16 RX ADMIN — HYDROCHLOROTHIAZIDE 12.5 MG: 12.5 TABLET ORAL at 15:31

## 2024-01-16 RX ADMIN — AMPICILLIN AND SULBACTAM 3 G: 2; 1 INJECTION, POWDER, FOR SOLUTION INTRAVENOUS at 08:27

## 2024-01-16 RX ADMIN — SODIUM CHLORIDE 100 ML/HR: 0.9 INJECTION, SOLUTION INTRAVENOUS at 09:44

## 2024-01-16 RX ADMIN — AMPICILLIN AND SULBACTAM 3 G: 2; 1 INJECTION, POWDER, FOR SOLUTION INTRAVENOUS at 14:07

## 2024-01-16 RX ADMIN — CARVEDILOL 25 MG: 25 TABLET, FILM COATED ORAL at 15:31

## 2024-01-16 RX ADMIN — CARVEDILOL 25 MG: 25 TABLET, FILM COATED ORAL at 08:27

## 2024-01-16 RX ADMIN — LISINOPRIL 20 MG: 20 TABLET ORAL at 08:27

## 2024-01-16 RX ADMIN — HYDROCHLOROTHIAZIDE 12.5 MG: 12.5 TABLET ORAL at 08:27

## 2024-01-16 RX ADMIN — AMPICILLIN AND SULBACTAM 3 G: 2; 1 INJECTION, POWDER, FOR SOLUTION INTRAVENOUS at 20:48

## 2024-01-16 NOTE — ASSESSMENT & PLAN NOTE
Lab Results   Component Value Date    HGBA1C 6.7 (H) 11/28/2023     Recent Labs     01/15/24  1618 01/15/24  2130 01/16/24  0550   POCGLU 161* 156* 127         Blood Sugar Average: Last 72 hrs:  (P) 148    SSI w/ meals & qhs   Home Jardiance on hold

## 2024-01-16 NOTE — ASSESSMENT & PLAN NOTE
POA w/ left hand cellulitis, swelling, & drainage   Sustained a bite by his hunt retriever on Saturday. Wound worsened and developed increased swelling and erythema and pain  On admissions, hand surgery recommended to open puncture wounds at the bedside which was performed by emergency medicine.  3 times daily soaks with warm soapy water plus IV antibiotics.    Hand surgery to perform bedside I&D today   Continue IV Unasyn & analgesia  No clinical evidence of de Quervain's tenosynovitis

## 2024-01-16 NOTE — PROGRESS NOTES
"Count includes the [facility]  Progress Note  Name: Yoni Castillo I  MRN: [id number]  Unit/Bed#: [place] [address] -02 I Date of Admission: 1/15/2024   Date of Service: 1/16/2024 I Hospital Day: 1    Assessment/Plan   * Cellulitis of hand  Assessment & Plan  POA w/ left hand cellulitis, swelling, & drainage   Sustained a bite by his hunt retriever on Saturday. Wound worsened and developed increased swelling and erythema and pain  On admissions, hand surgery recommended to open puncture wounds at the bedside which was performed by emergency medicine.  3 times daily soaks with warm soapy water plus IV antibiotics.    Orthopedic surgery to perform bedside I&D today   Continue IV Unasyn & analgesia  No clinical evidence of de Quervain's tenosynovitis            Continuous opioid dependence (HCC)  Assessment & Plan  Utilizes tramadol  PDMP meeting attending; \"no red flags\"    Chronic kidney disease, stage 3 ([facility])  Assessment & Plan  Lab Results   Component Value Date    EGFR 65 01/16/2024    EGFR 52 01/15/2024    EGFR 64 12/04/2023    CREATININE 1.19 01/16/2024    CREATININE 1.43 (H) 01/15/2024    CREATININE 1.21 12/04/2023     Currently at baseline  Avoid nephrotoxins    Lumbar radiculopathy  Assessment & Plan  Chronically maintained on tramadol, on hold   As needed analgesia while inpatient    Type 2 diabetes mellitus with chronic kidney disease, without long-term current use of insulin ([facility])  Assessment & Plan  Lab Results   Component Value Date    HGBA1C 6.7 (H) 11/28/2023     Recent Labs     01/15/24  1618 01/15/24  2130 01/16/24  0550   POCGLU 161* 156* 127         Blood Sugar Average: Last 72 hrs:  (P) 148    SSI w/ meals & qhs   Home Jardiance on hold     Essential hypertension  Assessment & Plan  Continue home medications with hold parameters  BP acceptable          VTE Pharmacologic Prophylaxis: VTE Score: 3 High Risk (Score >/= 5) - Pharmacological DVT Prophylaxis Ordered: heparin. Sequential " Compression Devices Ordered.    Mobility:   Basic Mobility Inpatient Raw Score: 24  JH-HLM Goal: 8: Walk 250 feet or more  JH-HLM Achieved: 8: Walk 250 feet ot more  HLM Goal achieved. Continue to encourage appropriate mobility.    Patient Centered Rounds: I performed bedside rounds with nursing staff today.   Discussions with Specialists or Other Care Team Provider: Hand surgery consulted; case management    Education and Discussions with Family / Patient: Patient declined call to .     Total Time Spent on Date of Encounter in care of patient: 45 mins. This time was spent on one or more of the following: performing physical exam; counseling and coordination of care; obtaining or reviewing history; documenting in the medical record; reviewing/ordering tests, medications or procedures; communicating with other healthcare professionals and discussing with patient's family/caregivers.    Current Length of Stay: 1 day(s)  Current Patient Status: Inpatient   Certification Statement: The patient will continue to require additional inpatient hospital stay due to extensive hand cellulitis  Discharge Plan: Anticipate discharge tomorrow to home.  If clinically stable    Code Status: Level 3 - DNAR and DNI    Subjective:   Patient notes 7 out of 10 pain in the left hand specifically with attempts to extend.  He denies fever.  He is without chest pain, shortness of breath, nausea, vomiting, abdominal pain, diarrhea, or dysuria.    Objective:     Vitals:   Temp (24hrs), Av.2 °F (36.8 °C), Min:97.9 °F (36.6 °C), Max:98.6 °F (37 °C)    Temp:  [97.9 °F (36.6 °C)-98.6 °F (37 °C)] 98.1 °F (36.7 °C)  HR:  [68-82] 75  Resp:  [17-20] 20  BP: (137-158)/(85-98) 146/93  SpO2:  [93 %-99 %] 94 %  Body mass index is 36.69 kg/m².     Input and Output Summary (last 24 hours):   No intake or output data in the 24 hours ending 24 1009    Physical Exam:   Physical Exam  Constitutional:       Appearance: He is normal weight.    HENT:      Head: Normocephalic.      Right Ear: External ear normal.      Left Ear: External ear normal.      Nose: Nose normal.      Mouth/Throat:      Mouth: Mucous membranes are moist.      Pharynx: Oropharynx is clear.   Eyes:      Extraocular Movements: Extraocular movements intact.      Conjunctiva/sclera: Conjunctivae normal.   Cardiovascular:      Rate and Rhythm: Normal rate and regular rhythm.      Pulses: Normal pulses.      Heart sounds: Normal heart sounds.   Pulmonary:      Effort: Pulmonary effort is normal.      Breath sounds: Normal breath sounds.   Abdominal:      General: Abdomen is flat. Bowel sounds are normal.      Palpations: Abdomen is soft.   Musculoskeletal:         General: Swelling (Edematous left hand) and tenderness (Tenderness to palpation of palmar aspect of left hand) present. Normal range of motion.      Cervical back: Normal range of motion.      Comments: Able to extend hand; able to flex/extend all fingers   Skin:     General: Skin is warm.      Capillary Refill: Capillary refill takes less than 2 seconds.      Findings: Erythema (Cellulitis present on palmar/dorsal left hand with puncture wounds) present.   Neurological:      General: No focal deficit present.      Mental Status: He is alert. Mental status is at baseline.   Psychiatric:         Mood and Affect: Mood normal.         Thought Content: Thought content normal.         Judgment: Judgment normal.        Additional Data:     Labs:  Results from last 7 days   Lab Units 01/16/24  0456 01/15/24  1402   WBC Thousand/uL 5.81 6.81   HEMOGLOBIN g/dL 12.8 13.0   HEMATOCRIT % 38.4 38.3   PLATELETS Thousands/uL 221 216   NEUTROS PCT %  --  71   LYMPHS PCT %  --  15   MONOS PCT %  --  9   EOS PCT %  --  4     Results from last 7 days   Lab Units 01/16/24  0456 01/15/24  1402   SODIUM mmol/L 134* 133*   POTASSIUM mmol/L 4.2 4.6   CHLORIDE mmol/L 105 102   CO2 mmol/L 21 23   BUN mg/dL 41* 41*   CREATININE mg/dL 1.19 1.43*   ANION  GAP mmol/L 8 8   CALCIUM mg/dL 9.1 9.7   ALBUMIN g/dL  --  4.2   TOTAL BILIRUBIN mg/dL  --  0.43   ALK PHOS U/L  --  52   ALT U/L  --  19   AST U/L  --  15   GLUCOSE RANDOM mg/dL 118 162*         Results from last 7 days   Lab Units 01/16/24  0550 01/15/24  2130 01/15/24  1618   POC GLUCOSE mg/dl 127 156* 161*               Lines/Drains:  Invasive Devices       Peripheral Intravenous Line  Duration             Peripheral IV 12/04/23 Right;Ventral (anterior) Forearm 42 days    Peripheral IV 01/15/24 Proximal;Right;Ventral (anterior) Forearm <1 day                          Imaging: Reviewed radiology reports from this admission including: xray(s)    Recent Cultures (last 7 days):         Last 24 Hours Medication List:   Current Facility-Administered Medications   Medication Dose Route Frequency Provider Last Rate    acetaminophen  650 mg Oral Q6H PRN Sean Morgan, DO      aluminum-magnesium hydroxide-simethicone  30 mL Oral Q6H PRN Sean Morgan, DO      ampicillin-sulbactam  3 g Intravenous Q6H Sean Morgan, DO 3 g (01/16/24 0827)    carvedilol  25 mg Oral BID With Meals Sean Morgan DO      heparin (porcine)  5,000 Units Subcutaneous Q8H [facility] Sean Morgan, DO      lisinopril  20 mg Oral BID Sean Morgan DO      And    hydrochlorothiazide  12.5 mg Oral BID Sean Morgan, DO      insulin lispro  1-6 Units Subcutaneous Q6H Jose Alfredo Noel PA-C      ketorolac  1 drop Both Eyes 4x Daily PRN Yesenia Turner PA-C      lidocaine  2 patch Topical Daily Sean Morgan DO      ondansetron  4 mg Intravenous Q6H PRN Sean Morgan, DO      oxyCODONE  10 mg Oral Q4H PRN Yesenia Turner PA-C      oxyCODONE  5 mg Oral Q6H PRN Sean Morgan, DO      sodium chloride  100 mL/hr Intravenous Continuous Sean Morgan,  mL/hr (01/16/24 0944)        Today, Patient Was Seen By: Yesenia Turner PA-C    **Please Note: This note may have been constructed using a voice recognition system.**

## 2024-01-16 NOTE — ASSESSMENT & PLAN NOTE
Lab Results   Component Value Date    EGFR 65 01/16/2024    EGFR 52 01/15/2024    EGFR 64 12/04/2023    CREATININE 1.19 01/16/2024    CREATININE 1.43 (H) 01/15/2024    CREATININE 1.21 12/04/2023     Currently at baseline  Avoid nephrotoxins

## 2024-01-16 NOTE — PLAN OF CARE
Problem: PAIN - ADULT  Goal: Verbalizes/displays adequate comfort level or baseline comfort level  Description: Interventions:  - Encourage patient to monitor pain and request assistance  - Assess pain using appropriate pain scale  - Administer analgesics based on type and severity of pain and evaluate response  - Implement non-pharmacological measures as appropriate and evaluate response  - Consider cultural and social influences on pain and pain management  - Notify physician/advanced practitioner if interventions unsuccessful or patient reports new pain  1/16/2024 0259 by Ping Hunter RN  Outcome: Progressing  1/16/2024 0259 by Ping Hunter RN  Outcome: Progressing     Problem: INFECTION - ADULT  Goal: Absence or prevention of progression during hospitalization  Description: INTERVENTIONS:  - Assess and monitor for signs and symptoms of infection  - Monitor lab/diagnostic results  - Monitor all insertion sites, i.e. indwelling lines, tubes, and drains  - Monitor endotracheal if appropriate and nasal secretions for changes in amount and color  - [place] appropriate cooling/warming therapies per order  - Administer medications as ordered  - Instruct and encourage patient and family to use good hand hygiene technique  - Identify and instruct in appropriate isolation precautions for identified infection/condition  1/16/2024 0259 by Ping Hunter RN  Outcome: Progressing  1/16/2024 0259 by Ping Hunter RN  Outcome: Progressing     Problem: SAFETY ADULT  Goal: Patient will remain free of falls  Description: INTERVENTIONS:  - Educate patient/family on patient safety including physical limitations  - Instruct patient to call for assistance with activity   - Consult OT/PT to assist with strengthening/mobility   - Keep Call bell within reach  - Keep bed low and locked with side rails adjusted as appropriate  - Keep care items and personal belongings within reach  - Initiate and maintain comfort rounds  -  Make Fall Risk Sign visible to staff  - Offer Toileting every 2 Hours, in advance of need  - Initiate/Maintain bed alarm  - Obtain necessary fall risk management equipment: alarm   - Apply yellow socks and bracelet for high fall risk patients  - Consider moving patient to room near nurses station  1/16/2024 0259 by Ping Hunter RN  Outcome: Progressing  1/16/2024 0259 by Ping Hunter RN  Outcome: Progressing  Goal: Maintain or return to baseline ADL function  Description: INTERVENTIONS:  -  Assess patient's ability to carry out ADLs; assess patient's baseline for ADL function and identify physical deficits which impact ability to perform ADLs (bathing, care of mouth/teeth, toileting, grooming, dressing, etc.)  - Assess/evaluate cause of self-care deficits   - Assess range of motion  - Assess patient's mobility; develop plan if impaired  - Assess patient's need for assistive devices and provide as appropriate  - Encourage maximum independence but intervene and supervise when necessary  - Involve family in performance of ADLs  - Assess for home care needs following discharge   - Consider OT consult to assist with ADL evaluation and planning for discharge  - Provide patient education as appropriate  1/16/2024 0259 by Ping Hunter RN  Outcome: Progressing  1/16/2024 0259 by Ping Hunter RN  Outcome: Progressing  Goal: Maintains/Returns to pre admission functional level  Description: INTERVENTIONS:  - Perform AM-PAC 6 Click Basic Mobility/ Daily Activity assessment daily.  - Set and communicate daily mobility goal to care team and patient/family/caregiver.   - Collaborate with rehabilitation services on mobility goals if consulted  - Perform Range of Motion 4 times a day.  - Reposition patient every 2 hours.  - Dangle patient 3 times a day  - Stand patient 3 times a day  - Ambulate patient 3 times a day  - Out of bed to chair 3 times a day   - Out of bed for meals 3 times a day  - Out of bed for  toileting  - Record patient progress and toleration of activity level   1/16/2024 0259 by Ping Hunter RN  Outcome: Progressing  1/16/2024 0259 by Ping Hunter RN  Outcome: Progressing     Problem: DISCHARGE PLANNING  Goal: Discharge to home or other facility with appropriate resources  Description: INTERVENTIONS:  - Identify barriers to discharge w/patient and caregiver  - Arrange for needed discharge resources and transportation as appropriate  - Identify discharge learning needs (meds, wound care, etc.)  - Arrange for interpretive services to assist at discharge as needed  - Refer to Case Management Department for coordinating discharge planning if the patient needs post-hospital services based on physician/advanced practitioner order or complex needs related to functional status, cognitive ability, or social support system  1/16/2024 0259 by Ping Hunter RN  Outcome: Progressing  1/16/2024 0259 by Ping Hunter RN  Outcome: Progressing     Problem: Knowledge Deficit  Goal: Patient/family/caregiver demonstrates understanding of disease process, treatment plan, medications, and discharge instructions  Description: Complete learning assessment and assess knowledge base.  Interventions:  - Provide teaching at level of understanding  - Provide teaching via preferred learning methods  1/16/2024 0259 by Ping Hunter RN  Outcome: Progressing  1/16/2024 0259 by Ping Hunter RN  Outcome: Progressing     Problem: SKIN/TISSUE INTEGRITY - ADULT  Goal: Skin Integrity remains intact(Skin Breakdown Prevention)  Description: Assess:  -Perform Malachi assessment every shift   -Clean and moisturize skin every shift   -Inspect skin when repositioning, toileting, and assisting with ADLS  -Assess under medical devices such as iv every shift   -Assess extremities for adequate circulation and sensation     Bed Management:  -Have minimal linens on bed & keep smooth, unwrinkled  -Change linens as needed when moist  or perspiring  -Avoid sitting or lying in one position for more than 2 hours while in bed  -Keep HOB at 30 degrees     Toileting:  -Offer bedside commode  -Assess for incontinence every shift   -Use incontinent care products after each incontinent episode such as foam cleanser     Activity:  -Mobilize patient 3 times a day  -Encourage activity and walks on unit  -Encourage or provide ROM exercises   -Turn and reposition patient every 2 Hours  -Use appropriate equipment to lift or move patient in bed  -Instruct/ Assist with weight shifting every shift when out of bed in chair  -Consider limitation of chair time 2 hour intervals    Skin Care:  -Avoid use of baby powder, tape, friction and shearing, hot water or constrictive clothing  -Relieve pressure over bony prominences using pillows   -Do not massage red bony areas    Next Steps:  -Teach patient strategies to minimize risks such as wounds    -Consider consults to  interdisciplinary teams such as wound care   1/16/2024 0259 by Ping Hunter RN  Outcome: Progressing  1/16/2024 0259 by Ping Hunter RN  Outcome: Progressing  Goal: Incision(s), wounds(s) or drain site(s) healing without S/S of infection  Description: INTERVENTIONS  - Assess and document dressing, incision, wound bed, drain sites and surrounding tissue  - Provide patient and family education  - Perform skin care/dressing changes every shift  1/16/2024 0259 by Ping Hunter RN  Outcome: Progressing  1/16/2024 0259 by Ping Hunter RN  Outcome: Progressing  Goal: Pressure injury heals and does not worsen  Description: Interventions:  - Implement low air loss mattress or specialty surface (Criteria met)  - Apply silicone foam dressing  - Instruct/assist with weight shifting every 60  minutes when in chair   - Limit chair time to 2 hour intervals  - Use special pressure reducing interventions such as wounds  when in chair   - Apply fecal or urinary incontinence containment device   - Perform  passive or active ROM every shift   - Turn and reposition patient & offload bony prominences every 2 hours   - Utilize friction reducing device or surface for transfers   - Consider consults to  interdisciplinary teams such as wound care   - Use incontinent care products after each incontinent episode such as foam cleanser   - Consider nutrition services referral as needed  1/16/2024 0259 by Ping Hunter RN  Outcome: Progressing  1/16/2024 0259 by Ping Hunter, RN  Outcome: Progressing

## 2024-01-16 NOTE — CONSULTS
"HAND & UPPER EXTREMITY CONSULT NOTE   Referred By:  No referring provider defined for this encounter.      Chief Complaint:     Left hand pain    History of Present Illness:   58 y.o., right hand dominant, diabetic male presents with left hand pain for the last 3 days after a dog bite on 1/13/2024.  He is ready to fight with his own dog actually got bit on the left hand with puncture wounds on the dorsum of the thumb and thenar eminence.  He reports that initial pain and he cleanse and soak the hand in Epsom salt and peroxide bath.  However on the next morning when he woke up he had increased pain in the hand.  He had some chills but denies documented fevers.  Pain progressed and he noticed redness and swelling to the hand prompting his presentation to emergency department yesterday.  In the emergency department his wounds were \"opened up\" and he was given 1 soak and admitted for IV antibiotics.  He has not gotten any soaks since that time.    This morning he states that the hand is still \"very sore\" and feels that the pain is similar if not little bit worse than it was yesterday.  Denies any fevers or chills this morning.  Denies numbness or tingling in the hand.    Past Medical History:  Past Medical History:   Diagnosis Date    Chronic pain disorder     Diabetes mellitus (HCC)     H/O eye surgery     High blood sugar     Hypertension     Liver disease          Physical Examination:    /93   Pulse 75   Temp 98.1 °F (36.7 °C)   Resp 20   Ht 6' (1.829 m)   Wt 123 kg (270 lb 8.1 oz)   SpO2 94%   BMI 36.69 kg/m²     Gen: A&Ox3, NAD  Cardiac: regular rate  Chest: non labored breathing  Abdomen: Non-distended    Right Upper Extremity:  Skin CDI  No obvious deformity of the shoulder, arm, elbow, forearm, wrist, hand  Nontender  Composite fist  Sensation intact to light touch in the axillary median, ulnar, and radial nerve distributions  5/5 motor  strength  2+RP      Left Upper Extremity:  2 puncture " wounds on the dorsum of the thumb overlying the metacarpal shaft, 1 puncture wound on the thenar eminence volarly.  The dorsal wound has more erythema surrounding it but no erythema tracking proximal to the wrist.  He is very tender to palpation dorsally and volarly.  No obvious fluctuance.  No expressible or visible drainage.  Able to make a loose fist which is limited by swelling and pain  No severe pain with movement of the thumb or index finger.  Thumb flexion, extension, abduction, adduction are intact but motion is decreased due to pain  Sensation intact to light touch in the axillary median, ulnar, and radial nerve distributions  2+RP, warm well-perfused fingers      Studies:  Radiographs: I personally reviewed and independently interpreted the available radiographs.  1/15/2024: Radiographs of the left hand, multiple views, demonstrate no fractures or dislocations.  Soft tissue swelling but otherwise unremarkable. Please see official report.      Labs:  White blood cell count 5.81  Glucose 127 this morning on POC, 118 on BMP    Blood cultures no growth to date    Assessment and Plan:  58 y.o. male presents with a left hand infection after dog bite.  His symptoms progressed prior to presentation and even on IV Unasyn antibiotics overnight his symptoms did progress some. we discussed the natural history of this condition and its pathogenesis.  We discussed operative and nonoperative treatment options.    Based on his lack of improvement after overnight IV antibiotics we discussed that he likely has some abscess formation at these 2 puncture sites which is unable to drain since it closed up superficially.  I think that a bedside incision and drainage is reasonable at this timeframe given his overall clinical presentation.  His vital signs are stable and his labs are fairly unremarkable.  This  infection seems to be isolated at the dorsal and palmar thumb.  He is in agreement with doing this and a bedside I&D was  performed this morning.  Please see procedure note for details.  There is good evacuation of purulence both volarly and dorsally.  The wounds were left open and he was started on warm soapy soaks.    I am hopeful that by decompressing these 2 wounds and continue soaks to allow the wound to drain we can avoid having do a more formal I&D in the operating room.  He should continue to do warm soapy soaks 3-4 times a day.  Continue IV Unasyn.      Please make him n.p.o. after midnight.  I will reevaluate him in the morning and if he does not have clinical improvement he may require an operative I&D tomorrow afternoon.    he expressed understanding of the plan and agreed. We encouraged them to contact our office with any questions or concerns.       [unfilled]  Carroll Mccarthy MD  Hand and Upper Extremity Surgery

## 2024-01-16 NOTE — PROCEDURES
The patient verbally consented to performing an I&D of the left hand at both the volar and dorsal puncture wounds.  A timeout was performed confirming the patient and procedure site.  The procedure site was prepped with Betadine.  A field block with 10 cc 1% lidocaine without epinephrine was administered for local anesthetic.  The 2 dorsal wounds were then connected with an incision longitudinally.  Scissors used to bluntly spread.  Tendons were not visualized.  There was purulence evacuated from the distal aspect of the wound.  No expressible purulence proximally.  We then made incision over the thenar eminence incision and purulence was evacuated.  This was decompressed further with scissors to bluntly spread the soft tissues.  Both incisions were milked of any remaining purulence and he started warm soapy soaks.  Incisions will be left open.  He can do soaks 3 times a day in warm soapy water for 10 minutes.  Dressed in between with Xeroform, Kerlix and tape.    Patient tolerated the procedure well without any immediate complications.

## 2024-01-16 NOTE — PLAN OF CARE
Problem: INFECTION - ADULT  Goal: Absence or prevention of progression during hospitalization  Description: INTERVENTIONS:  - Assess and monitor for signs and symptoms of infection  - Monitor lab/diagnostic results  - Monitor all insertion sites, i.e. indwelling lines, tubes, and drains  - Monitor endotracheal if appropriate and nasal secretions for changes in amount and color  - Cameron appropriate cooling/warming therapies per order  - Administer medications as ordered  - Instruct and encourage patient and family to use good hand hygiene technique  - Identify and instruct in appropriate isolation precautions for identified infection/condition  Outcome: Progressing

## 2024-01-16 NOTE — ASSESSMENT & PLAN NOTE
Presented w/ left hand cellulitis, swelling, & drainage   Sustained a bite by his hunt retriever on Saturday. Wound worsened and developed increased swelling and erythema and pain  On admissions, hand surgery recommended to open puncture wounds at the bedside which was performed by emergency medicine.  3 times daily soaks with warm soapy water   Continue IV Unasyn day 3  Hand surgery following will re-evaluate in AM for possible operative intervention  Under went I&D 1/16/24  No clinical evidence of de Quervain's tenosynovitis

## 2024-01-17 LAB
ANION GAP SERPL CALCULATED.3IONS-SCNC: 7 MMOL/L
BUN SERPL-MCNC: 33 MG/DL (ref 5–25)
CALCIUM SERPL-MCNC: 8.6 MG/DL (ref 8.4–10.2)
CHLORIDE SERPL-SCNC: 104 MMOL/L (ref 96–108)
CO2 SERPL-SCNC: 24 MMOL/L (ref 21–32)
CREAT SERPL-MCNC: 1.13 MG/DL (ref 0.6–1.3)
ERYTHROCYTE [DISTWIDTH] IN BLOOD BY AUTOMATED COUNT: 13 % (ref 11.6–15.1)
GFR SERPL CREATININE-BSD FRML MDRD: 70 ML/MIN/1.73SQ M
GLUCOSE SERPL-MCNC: 117 MG/DL (ref 65–140)
GLUCOSE SERPL-MCNC: 131 MG/DL (ref 65–140)
HCT VFR BLD AUTO: 36.9 % (ref 36.5–49.3)
HGB BLD-MCNC: 12.4 G/DL (ref 12–17)
MCH RBC QN AUTO: 31 PG (ref 26.8–34.3)
MCHC RBC AUTO-ENTMCNC: 33.6 G/DL (ref 31.4–37.4)
MCV RBC AUTO: 92 FL (ref 82–98)
PLATELET # BLD AUTO: 215 THOUSANDS/UL (ref 149–390)
PMV BLD AUTO: 9.9 FL (ref 8.9–12.7)
POTASSIUM SERPL-SCNC: 4.1 MMOL/L (ref 3.5–5.3)
RBC # BLD AUTO: 4 MILLION/UL (ref 3.88–5.62)
SODIUM SERPL-SCNC: 135 MMOL/L (ref 135–147)
WBC # BLD AUTO: 4.31 THOUSAND/UL (ref 4.31–10.16)

## 2024-01-17 PROCEDURE — 82948 REAGENT STRIP/BLOOD GLUCOSE: CPT

## 2024-01-17 PROCEDURE — 99232 SBSQ HOSP IP/OBS MODERATE 35: CPT

## 2024-01-17 PROCEDURE — 85027 COMPLETE CBC AUTOMATED: CPT | Performed by: PHYSICIAN ASSISTANT

## 2024-01-17 PROCEDURE — 80048 BASIC METABOLIC PNL TOTAL CA: CPT | Performed by: PHYSICIAN ASSISTANT

## 2024-01-17 PROCEDURE — 99024 POSTOP FOLLOW-UP VISIT: CPT | Performed by: ORTHOPAEDIC SURGERY

## 2024-01-17 RX ADMIN — OXYCODONE HYDROCHLORIDE 10 MG: 10 TABLET ORAL at 13:51

## 2024-01-17 RX ADMIN — AMPICILLIN AND SULBACTAM 3 G: 2; 1 INJECTION, POWDER, FOR SOLUTION INTRAVENOUS at 21:32

## 2024-01-17 RX ADMIN — LISINOPRIL 20 MG: 20 TABLET ORAL at 16:10

## 2024-01-17 RX ADMIN — HYDROCHLOROTHIAZIDE 12.5 MG: 12.5 TABLET ORAL at 16:10

## 2024-01-17 RX ADMIN — KETOROLAC TROMETHAMINE 1 DROP: 5 SOLUTION OPHTHALMIC at 23:28

## 2024-01-17 RX ADMIN — AMPICILLIN AND SULBACTAM 3 G: 2; 1 INJECTION, POWDER, FOR SOLUTION INTRAVENOUS at 13:51

## 2024-01-17 RX ADMIN — AMPICILLIN AND SULBACTAM 3 G: 2; 1 INJECTION, POWDER, FOR SOLUTION INTRAVENOUS at 01:53

## 2024-01-17 RX ADMIN — CARVEDILOL 25 MG: 25 TABLET, FILM COATED ORAL at 16:10

## 2024-01-17 RX ADMIN — LIDOCAINE 5% 2 PATCH: 700 PATCH TOPICAL at 08:08

## 2024-01-17 RX ADMIN — KETOROLAC TROMETHAMINE 1 DROP: 5 SOLUTION OPHTHALMIC at 12:16

## 2024-01-17 RX ADMIN — AMPICILLIN AND SULBACTAM 3 G: 2; 1 INJECTION, POWDER, FOR SOLUTION INTRAVENOUS at 08:08

## 2024-01-17 RX ADMIN — CARVEDILOL 25 MG: 25 TABLET, FILM COATED ORAL at 08:08

## 2024-01-17 RX ADMIN — HYDROCHLOROTHIAZIDE 12.5 MG: 12.5 TABLET ORAL at 08:08

## 2024-01-17 RX ADMIN — LISINOPRIL 20 MG: 20 TABLET ORAL at 08:08

## 2024-01-17 RX ADMIN — OXYCODONE HYDROCHLORIDE 10 MG: 10 TABLET ORAL at 02:51

## 2024-01-17 NOTE — PROGRESS NOTES
S: Patient overall feels that his pain is much better than it was on admission.  He still has episodes of significant pain in the hand particularly when he rolled onto it last night or when he leaves the dependent but overall at rest his pain is improved.  He was able to sleep much better last night after the I&D yesterday.  He has been doing the soaks as directed.  Denies fevers or chills.    O:  AVSS, afebrile  Left hand:  I&D site on the dorsal radial hand/thumb remains open.  There is still surrounding erythema but it is slightly less than it was yesterday.  Improved swelling.   about the I&D site but slightly less so than yesterday.  No active drainage or expressible purulence this morning.  The palmar incision is starting to close up a bit but is nontender today no expressible purulence or drainage noted.  No significant erythema palmarly.  Able to flex and extend the thumb with minimal discomfort.  Nearly able to make a fist, limited by some swelling and continued discomfort  Sensation intact on the dorsal and palmar thumb to light touch  Warm well-perfused fingertips    A/P:  58-year-old male with a left hand infection following a dog bite to the dorsal radial hand/thumb and thenar eminence.  He underwent a bedside I&D on 1/16/2024 where purulence was evacuated both dorsally and palmarly.  The wounds were left open and he was started on 3 times daily soaks and continued on IV Unasyn for antibiotics.  Overall today seems to be doing a bit better than yesterday.  His pain and swelling are definitely improved.  His palmar wound looks much much better but he still having some redness and tenderness dorsally.  I do not think he needs operative intervention today but we should continue keep a close eye on this.    -He can have a diet today but please make n.p.o. at midnight for possible operative intervention tomorrow  -Continue 3 times daily soaks and warm soapy water, encouraged the patient to move  his fingers as much as tolerated while doing the soaks and to use his contralateral hand to express any remaining purulence.  - Continue IV Unasyn  -Ortho hand will continue to follow    Carroll Mccarthy MD

## 2024-01-17 NOTE — PLAN OF CARE
Problem: PAIN - ADULT  Goal: Verbalizes/displays adequate comfort level or baseline comfort level  Description: Interventions:  - Encourage patient to monitor pain and request assistance  - Assess pain using appropriate pain scale  - Administer analgesics based on type and severity of pain and evaluate response  - Implement non-pharmacological measures as appropriate and evaluate response  - Consider cultural and social influences on pain and pain management  - Notify physician/advanced practitioner if interventions unsuccessful or patient reports new pain  Outcome: Progressing     Problem: INFECTION - ADULT  Goal: Absence or prevention of progression during hospitalization  Description: INTERVENTIONS:  - Assess and monitor for signs and symptoms of infection  - Monitor lab/diagnostic results  - Monitor all insertion sites, i.e. indwelling lines, tubes, and drains  - Monitor endotracheal if appropriate and nasal secretions for changes in amount and color  - [place] appropriate cooling/warming therapies per order  - Administer medications as ordered  - Instruct and encourage patient and family to use good hand hygiene technique  - Identify and instruct in appropriate isolation precautions for identified infection/condition  Outcome: Progressing     Problem: SAFETY ADULT  Goal: Patient will remain free of falls  Description: INTERVENTIONS:  - Educate patient/family on patient safety including physical limitations  - Instruct patient to call for assistance with activity   - Consult OT/PT to assist with strengthening/mobility   - Keep Call bell within reach  - Keep bed low and locked with side rails adjusted as appropriate  - Keep care items and personal belongings within reach  - Initiate and maintain comfort rounds  - Make Fall Risk Sign visible to staff  - Offer Toileting every  Hours, in advance of need  - Initiate/Maintain alarm  - Obtain necessary fall risk management equipment:   - Apply yellow socks and  bracelet for high fall risk patients  - Consider moving patient to room near nurses station  Outcome: Progressing  Goal: Maintain or return to baseline ADL function  Description: INTERVENTIONS:  -  Assess patient's ability to carry out ADLs; assess patient's baseline for ADL function and identify physical deficits which impact ability to perform ADLs (bathing, care of mouth/teeth, toileting, grooming, dressing, etc.)  - Assess/evaluate cause of self-care deficits   - Assess range of motion  - Assess patient's mobility; develop plan if impaired  - Assess patient's need for assistive devices and provide as appropriate  - Encourage maximum independence but intervene and supervise when necessary  - Involve family in performance of ADLs  - Assess for home care needs following discharge   - Consider OT consult to assist with ADL evaluation and planning for discharge  - Provide patient education as appropriate  Outcome: Progressing  Goal: Maintains/Returns to pre admission functional level  Description: INTERVENTIONS:  - Perform AM-PAC 6 Click Basic Mobility/ Daily Activity assessment daily.  - Set and communicate daily mobility goal to care team and patient/family/caregiver.   - Collaborate with rehabilitation services on mobility goals if consulted  - Perform Range of Motion  times a day.  - Reposition patient every  hours.  - Dangle patient  times a day  - Stand patient  times a day  - Ambulate patient  times a day  - Out of bed to chair  times a day   - Out of bed for meals  times a day  - Out of bed for toileting  - Record patient progress and toleration of activity level   Outcome: Progressing     Problem: DISCHARGE PLANNING  Goal: Discharge to home or other facility with appropriate resources  Description: INTERVENTIONS:  - Identify barriers to discharge w/patient and caregiver  - Arrange for needed discharge resources and transportation as appropriate  - Identify discharge learning needs (meds, wound care, etc.)  -  Arrange for interpretive services to assist at discharge as needed  - Refer to Case Management Department for coordinating discharge planning if the patient needs post-hospital services based on physician/advanced practitioner order or complex needs related to functional status, cognitive ability, or social support system  Outcome: Progressing     Problem: Knowledge Deficit  Goal: Patient/family/caregiver demonstrates understanding of disease process, treatment plan, medications, and discharge instructions  Description: Complete learning assessment and assess knowledge base.  Interventions:  - Provide teaching at level of understanding  - Provide teaching via preferred learning methods  Outcome: Progressing     Problem: SKIN/TISSUE INTEGRITY - ADULT  Goal: Skin Integrity remains intact(Skin Breakdown Prevention)  Description: Assess:  -Perform Malachi assessment every   -Clean and moisturize skin every   -Inspect skin when repositioning, toileting, and assisting with ADLS  -Assess under medical devices such as  every   -Assess extremities for adequate circulation and sensation     Bed Management:  -Have minimal linens on bed & keep smooth, unwrinkled  -Change linens as needed when moist or perspiring  -Avoid sitting or lying in one position for more than  hours while in bed  -Keep HOB at degrees     Toileting:  -Offer bedside commode  -Assess for incontinence every   -Use incontinent care products after each incontinent episode such as     Activity:  -Mobilize patient  times a day  -Encourage activity and walks on unit  -Encourage or provide ROM exercises   -Turn and reposition patient every  Hours  -Use appropriate equipment to lift or move patient in bed  -Instruct/ Assist with weight shifting every  when out of bed in chair  -Consider limitation of chair time  hour intervals    Skin Care:  -Avoid use of baby powder, tape, friction and shearing, hot water or constrictive clothing  -Relieve pressure over bony  prominences using   -Do not massage red bony areas    Next Steps:  -Teach patient strategies to minimize risks such as    -Consider consults to  interdisciplinary teams such as   Outcome: Progressing  Goal: Incision(s), wounds(s) or drain site(s) healing without S/S of infection  Description: INTERVENTIONS  - Assess and document dressing, incision, wound bed, drain sites and surrounding tissue  - Provide patient and family education  - Perform skin care/dressing changes every   Outcome: Progressing  Goal: Pressure injury heals and does not worsen  Description: Interventions:  - Implement low air loss mattress or specialty surface (Criteria met)  - Apply silicone foam dressing  - Instruct/assist with weight shifting every  minutes when in chair   - Limit chair time to  hour intervals  - Use special pressure reducing interventions such as  when in chair   - Apply fecal or urinary incontinence containment device   - Perform passive or active ROM every   - Turn and reposition patient & offload bony prominences every hours   - Utilize friction reducing device or surface for transfers   - Consider consults to  interdisciplinary teams such as   - Use incontinent care products after each incontinent episode such as  - Consider nutrition services referral as needed  Outcome: Progressing

## 2024-01-17 NOTE — CASE MANAGEMENT
Case Management Assessment & Discharge Planning Note    Patient name Yoni VALENCIA [facility]  Location [facility] 2 /South 2 M* MRN [id number]  : 1965 Date 2024       Current Admission Date: 1/15/2024  Current Admission Diagnosis:Cellulitis of hand   Patient Active Problem List    Diagnosis Date Noted    Cellulitis of hand 01/15/2024    Cellulitis of right foot 2023    Nail wound of right foot 2023    Dupuytren's disease of finger with nodules without contracture 10/17/2023    Acquired absence of other left toe(s) (HCC) 2023    Incisional hernia without obstruction or gangrene 2023    Ventral hernia without obstruction or gangrene 06/15/2023    Arthritis 2022    Continuous opioid dependence (HCC) 10/25/2022    Dyslipidemia 2022    H/O diabetic foot ulcer 2022    Chronic kidney disease, stage 3 ([facility]) 2022    Erectile dysfunction 2021    Neuropathy 2021    Low back pain with sciatica 2021    Lumbar radiculopathy 2021    Cervical radiculopathy 2021    Class 2 severe obesity with serious comorbidity and body mass index (BMI) of 35.0 to 35.9 in adult  2021    Essential hypertension     Type 2 diabetes mellitus with chronic kidney disease, without long-term current use of insulin ([facility])       LOS (days): 2  Geometric Mean LOS (GMLOS) (days): 3.2  Days to GMLOS:1.1     OBJECTIVE:    Risk of Unplanned Readmission Score: 16.73         Current admission status: Inpatient       Preferred Pharmacy:   [facility] - [place] [address]  Phone: [phone] Fax: [phone]    Primary Care Provider: Judd Ji MD    Primary Insurance: MEDICARE  Secondary Insurance: BLUE CROSS    ASSESSMENT:  Active Health Care Proxies       tevin dutta Health Care Representative - Significant Other   Primary Phone: [phone] (Mobile)                 Advance Directives  Does patient  have a Health Care POA?: Yes  Does patient have Advance Directives?: Yes  Advance Directives: Power of  for health care  Primary Contact: Girlfriend         Readmission Root Cause  30 Day Readmission: No    Patient Information  Admitted from:: Home  Mental Status: Alert  During Assessment patient was accompanied by: Not accompanied during assessment  Assessment information provided by:: Patient  Primary Caregiver: Self  Support Systems: Self, Spouse/significant other  County of Residence: [place]  What [facility] do you live in?: [place]  Home entry access options. Select all that apply.: Stairs  Number of steps to enter home.: 6  Do the steps have railings?: Yes  Type of Current Residence: 2 story home  Upon entering residence, is there a bedroom on the main floor (no further steps)?: No  A bedroom is located on the following floor levels of residence (select all that apply):: 2nd Floor  Upon entering residence, is there a bathroom on the main floor (no further steps)?: No  Indicate which floors of current residence have a bathroom (select all the apply):: 2nd Floor  Number of steps to 2nd floor from main floor: One Flight  Living Arrangements: Lives w/ Spouse/significant other  Is patient a ?: No    Activities of Daily Living Prior to Admission  Functional Status: Independent  Completes ADLs independently?: Yes  Ambulates independently?: Yes  Does patient use assisted devices?: No  Does patient currently own DME?: No  Does patient have a history of Outpatient Therapy (PT/OT)?: No  Does the patient have a history of Short-Term Rehab?: No  Does patient have a history of HHC?: No  Does patient currently have HHC?: No         Patient Information Continued  Income Source: SSI/SSD  Does patient have prescription coverage?: Yes  Does patient receive dialysis treatments?: No  Does patient have a history of substance abuse?: No  Does patient have a history of Mental Health Diagnosis?: No         Means of  Transportation  Means of Transport to Appts:: Drives Self      Housing Stability: Low Risk  (1/17/2024)    Housing Stability Vital Sign     Unable to Pay for Housing in the Last Year: No     Number of Places Lived in the Last Year: 1     Unstable Housing in the Last Year: No   Food Insecurity: Food Insecurity Present (1/17/2024)    Hunger Vital Sign     Worried About Running Out of Food in the Last Year: Sometimes true     Ran Out of Food in the Last Year: Sometimes true   Transportation Needs: No Transportation Needs (1/17/2024)    PRAPARE - Transportation     Lack of Transportation (Medical): No     Lack of Transportation (Non-Medical): No   Utilities: At Risk (1/17/2024)    [facility] Utilities     Threatened with loss of utilities: Already shut off       DISCHARGE DETAILS:    Discharge planning discussed with:: Pt        CM contacted family/caregiver?: No- see comments (pt able to answer for himself)  Were Treatment Team discharge recommendations reviewed with patient/caregiver?: Yes  Did patient/caregiver verbalize understanding of patient care needs?: Yes            Requested Home Health Care         Is the patient interested in HHC at discharge?: No    DME Referral Provided  Referral made for DME?: No         Would you like to participate in our [facility] service program?  : No - Declined    Treatment Team Recommendation: Home  Discharge Destination Plan:: Home  Transport at Discharge : Automobile, Family                                      Additional Comments: Met with the pt and assessment completed.  He stated that he felt he did not need any post acute needs.  His S.O. will be able to assist with hand dressings as needed.  Waiting for surgical clearance for discharge.  CM will be available if needed.

## 2024-01-17 NOTE — PROGRESS NOTES
"[facility]  Progress Note  Name: Yoni Castillo I  MRN: [id number]  Unit/Bed#: [place] [address] -02 I Date of Admission: 1/15/2024   Date of Service: 1/17/2024 I Hospital Day: 2    Assessment/Plan   * Cellulitis of hand  Assessment & Plan  Presented w/ left hand cellulitis, swelling, & drainage   Sustained a bite by his hunt retriever on Saturday. Wound worsened and developed increased swelling and erythema and pain  On admissions, hand surgery recommended to open puncture wounds at the bedside which was performed by emergency medicine.  3 times daily soaks with warm soapy water   Continue IV Unasyn day 3  Hand surgery following will re-evaluate in AM for possible operative intervention  Under went I&D 1/16/24  No clinical evidence of de Quervain's tenosynovitis    Essential hypertension  Assessment & Plan  Continue home medications with hold parameters  BP acceptable     Continuous opioid dependence (HCC)  Assessment & Plan  Utilizes tramadol  PDMP meeting attending; \"no red flags\"    Chronic kidney disease, stage 3 (HCC)  Assessment & Plan  Lab Results   Component Value Date    EGFR 65 01/16/2024    EGFR 52 01/15/2024    EGFR 64 12/04/2023    CREATININE 1.19 01/16/2024    CREATININE 1.43 (H) 01/15/2024    CREATININE 1.21 12/04/2023     Currently at baseline  Avoid nephrotoxins    Type 2 diabetes mellitus with chronic kidney disease, without long-term current use of insulin ([facility])  Assessment & Plan  Lab Results   Component Value Date    HGBA1C 6.7 (H) 11/28/2023     Recent Labs     01/15/24  1618 01/15/24  2130 01/16/24  0550   POCGLU 161* 156* 127         Blood Sugar Average: Last 72 hrs:  (P) 148    SSI w/ meals & qhs   Home Jardiance on hold     Lumbar radiculopathy  Assessment & Plan  Chronically maintained on tramadol, on hold   As needed analgesia while inpatient           VTE Pharmacologic Prophylaxis: VTE Score: 3 Moderate Risk (Score 3-4) - Pharmacological DVT Prophylaxis Ordered: " heparin.    Mobility:   Basic Mobility Inpatient Raw Score: 24  JH-HLM Goal: 8: Walk 250 feet or more  JH-HLM Achieved: 8: Walk 250 feet ot more  HLM Goal achieved. Continue to encourage appropriate mobility.    Patient Centered Rounds: I performed bedside rounds with nursing staff today.   Discussions with Specialists or Other Care Team Provider: Adithya    Education and Discussions with Family / Patient: Patient declined call to .     Total Time Spent on Date of Encounter in care of patient: 30 mins. This time was spent on one or more of the following: performing physical exam; counseling and coordination of care; obtaining or reviewing history; documenting in the medical record; reviewing/ordering tests, medications or procedures; communicating with other healthcare professionals and discussing with patient's family/caregivers.    Current Length of Stay: 2 day(s)  Current Patient Status: Inpatient   Certification Statement: The patient will continue to require additional inpatient hospital stay due to hand cellulitis requiring close monitoring and possible intervention tomorrow AM  Discharge Plan: Anticipate discharge in 48 hrs to home.    Code Status: Level 3 - DNAR and DNI    Subjective:   Patient was seen laying in bed today. He reports that the same in his hand remains the same from yesterday. He states the pain is improved from presentation. He denies any acute complaints     Objective:     Vitals:   Temp (24hrs), Av.3 °F (36.8 °C), Min:98.3 °F (36.8 °C), Max:98.3 °F (36.8 °C)    Temp:  [98.3 °F (36.8 °C)] 98.3 °F (36.8 °C)  HR:  [68] 68  Resp:  [18] 18  BP: (137)/(87) 137/87  SpO2:  [95 %] 95 %  Body mass index is 36.69 kg/m².     Input and Output Summary (last 24 hours):     Intake/Output Summary (Last 24 hours) at 2024 1208  Last data filed at 2024 0000  Gross per 24 hour   Intake 720 ml   Output --   Net 720 ml       Physical Exam:   Physical Exam  Vitals and nursing note reviewed.    Constitutional:       Appearance: Normal appearance. He is obese.   HENT:      Head: Normocephalic and atraumatic.   Eyes:      General: No scleral icterus.  Cardiovascular:      Rate and Rhythm: Normal rate.   Pulmonary:      Effort: Pulmonary effort is normal.      Breath sounds: Normal breath sounds.   Abdominal:      General: Abdomen is flat. Bowel sounds are normal.      Palpations: Abdomen is soft.      Tenderness: There is no abdominal tenderness.   Musculoskeletal:      Right lower leg: No edema.      Left lower leg: No edema.   Skin:     General: Skin is warm and dry.      Comments: Left hand in gauze dressing    Neurological:      Mental Status: He is alert. Mental status is at baseline.      Comments: Sensation intact to light touch left hand. ROM decreased in left wrist, ROM decreased in left thumb.    Psychiatric:         Mood and Affect: Mood normal.         Behavior: Behavior normal.        Additional Data:     Labs:  Results from last 7 days   Lab Units 01/17/24  0436 01/16/24  0456 01/15/24  1402   WBC Thousand/uL 4.31   < > 6.81   HEMOGLOBIN g/dL 12.4   < > 13.0   HEMATOCRIT % 36.9   < > 38.3   PLATELETS Thousands/uL 215   < > 216   NEUTROS PCT %  --   --  71   LYMPHS PCT %  --   --  15   MONOS PCT %  --   --  9   EOS PCT %  --   --  4    < > = values in this interval not displayed.     Results from last 7 days   Lab Units 01/17/24  0436 01/16/24  0456 01/15/24  1402   SODIUM mmol/L 135   < > 133*   POTASSIUM mmol/L 4.1   < > 4.6   CHLORIDE mmol/L 104   < > 102   CO2 mmol/L 24   < > 23   BUN mg/dL 33*   < > 41*   CREATININE mg/dL 1.13   < > 1.43*   ANION GAP mmol/L 7   < > 8   CALCIUM mg/dL 8.6   < > 9.7   ALBUMIN g/dL  --   --  4.2   TOTAL BILIRUBIN mg/dL  --   --  0.43   ALK PHOS U/L  --   --  52   ALT U/L  --   --  19   AST U/L  --   --  15   GLUCOSE RANDOM mg/dL 131   < > 162*    < > = values in this interval not displayed.         Results from last 7 days   Lab Units 01/17/24  0546  01/16/24  2356 01/16/24  1705 01/16/24  1110 01/16/24  0550 01/15/24  2130 01/15/24  1618   POC GLUCOSE mg/dl 117 164* 176* 218* 127 156* 161*               Lines/Drains:  Invasive Devices       Peripheral Intravenous Line  Duration             Peripheral IV 01/15/24 Proximal;Right;Ventral (anterior) Forearm 1 day                          Imaging: No pertinent imaging reviewed.    Recent Cultures (last 7 days):         Last 24 Hours Medication List:   Current Facility-Administered Medications   Medication Dose Route Frequency Provider Last Rate    acetaminophen  650 mg Oral Q6H PRN Sean Morgan DO      aluminum-magnesium hydroxide-simethicone  30 mL Oral Q6H PRN Sean Morgan DO      ampicillin-sulbactam  3 g Intravenous Q6H Sean Morgan DO 3 g (01/17/24 0808)    carvedilol  25 mg Oral BID With Meals Sean Morgan DO      heparin (porcine)  5,000 Units Subcutaneous Q8H [facility] Sean Morgan DO      lisinopril  20 mg Oral BID Sean Morgan DO      And    hydrochlorothiazide  12.5 mg Oral BID Sean Morgan DO      insulin lispro  1-6 Units Subcutaneous Q6H Jose Alfredo Noel PA-C      ketorolac  1 drop Both Eyes 4x Daily PRN Yesenia Turner PA-C      lidocaine  2 patch Topical Daily Sean Morgan DO      ondansetron  4 mg Intravenous Q6H PRN Sean Morgan DO      oxyCODONE  10 mg Oral Q4H PRN Yesenia Turner PA-C      oxyCODONE  5 mg Oral Q6H PRN Luke Wallace DO          Today, Patient Was Seen By: Jasmin Alvarez PA-C    **Please Note: This note may have been constructed using a voice recognition system.**

## 2024-01-18 ENCOUNTER — APPOINTMENT (INPATIENT)
Dept: CT IMAGING | Facility: HOSPITAL | Age: 59
DRG: 605 | End: 2024-01-18
Payer: MEDICARE

## 2024-01-18 LAB
ANION GAP SERPL CALCULATED.3IONS-SCNC: 8 MMOL/L
BASOPHILS # BLD AUTO: 0.05 THOUSANDS/ÂΜL (ref 0–0.1)
BASOPHILS NFR BLD AUTO: 1 % (ref 0–1)
BUN SERPL-MCNC: 37 MG/DL (ref 5–25)
CALCIUM SERPL-MCNC: 8.6 MG/DL (ref 8.4–10.2)
CHLORIDE SERPL-SCNC: 107 MMOL/L (ref 96–108)
CO2 SERPL-SCNC: 22 MMOL/L (ref 21–32)
CREAT SERPL-MCNC: 1.37 MG/DL (ref 0.6–1.3)
EOSINOPHIL # BLD AUTO: 0.19 THOUSAND/ÂΜL (ref 0–0.61)
EOSINOPHIL NFR BLD AUTO: 4 % (ref 0–6)
ERYTHROCYTE [DISTWIDTH] IN BLOOD BY AUTOMATED COUNT: 13 % (ref 11.6–15.1)
GFR SERPL CREATININE-BSD FRML MDRD: 55 ML/MIN/1.73SQ M
GLUCOSE SERPL-MCNC: 109 MG/DL (ref 65–140)
GLUCOSE SERPL-MCNC: 118 MG/DL (ref 65–140)
GLUCOSE SERPL-MCNC: 119 MG/DL (ref 65–140)
GLUCOSE SERPL-MCNC: 160 MG/DL (ref 65–140)
GLUCOSE SERPL-MCNC: 174 MG/DL (ref 65–140)
HCT VFR BLD AUTO: 35.1 % (ref 36.5–49.3)
HGB BLD-MCNC: 11.7 G/DL (ref 12–17)
IMM GRANULOCYTES # BLD AUTO: 0.01 THOUSAND/UL (ref 0–0.2)
IMM GRANULOCYTES NFR BLD AUTO: 0 % (ref 0–2)
LYMPHOCYTES # BLD AUTO: 1.4 THOUSANDS/ÂΜL (ref 0.6–4.47)
LYMPHOCYTES NFR BLD AUTO: 33 % (ref 14–44)
MCH RBC QN AUTO: 30.8 PG (ref 26.8–34.3)
MCHC RBC AUTO-ENTMCNC: 33.3 G/DL (ref 31.4–37.4)
MCV RBC AUTO: 92 FL (ref 82–98)
MONOCYTES # BLD AUTO: 0.5 THOUSAND/ÂΜL (ref 0.17–1.22)
MONOCYTES NFR BLD AUTO: 12 % (ref 4–12)
NEUTROPHILS # BLD AUTO: 2.14 THOUSANDS/ÂΜL (ref 1.85–7.62)
NEUTS SEG NFR BLD AUTO: 50 % (ref 43–75)
NRBC BLD AUTO-RTO: 0 /100 WBCS
PLATELET # BLD AUTO: 218 THOUSANDS/UL (ref 149–390)
PMV BLD AUTO: 9.6 FL (ref 8.9–12.7)
POTASSIUM SERPL-SCNC: 4.4 MMOL/L (ref 3.5–5.3)
RBC # BLD AUTO: 3.8 MILLION/UL (ref 3.88–5.62)
SODIUM SERPL-SCNC: 137 MMOL/L (ref 135–147)
WBC # BLD AUTO: 4.29 THOUSAND/UL (ref 4.31–10.16)

## 2024-01-18 PROCEDURE — 73201 CT UPPER EXTREMITY W/DYE: CPT

## 2024-01-18 PROCEDURE — 85025 COMPLETE CBC W/AUTO DIFF WBC: CPT

## 2024-01-18 PROCEDURE — 99232 SBSQ HOSP IP/OBS MODERATE 35: CPT

## 2024-01-18 PROCEDURE — 82948 REAGENT STRIP/BLOOD GLUCOSE: CPT

## 2024-01-18 PROCEDURE — G1004 CDSM NDSC: HCPCS

## 2024-01-18 PROCEDURE — 87070 CULTURE OTHR SPECIMN AEROBIC: CPT | Performed by: ORTHOPAEDIC SURGERY

## 2024-01-18 PROCEDURE — 99024 POSTOP FOLLOW-UP VISIT: CPT | Performed by: ORTHOPAEDIC SURGERY

## 2024-01-18 PROCEDURE — 87205 SMEAR GRAM STAIN: CPT | Performed by: ORTHOPAEDIC SURGERY

## 2024-01-18 PROCEDURE — 80048 BASIC METABOLIC PNL TOTAL CA: CPT

## 2024-01-18 RX ADMIN — HYDROCHLOROTHIAZIDE 12.5 MG: 12.5 TABLET ORAL at 09:13

## 2024-01-18 RX ADMIN — OXYCODONE HYDROCHLORIDE 10 MG: 10 TABLET ORAL at 09:21

## 2024-01-18 RX ADMIN — IOHEXOL 100 ML: 350 INJECTION, SOLUTION INTRAVENOUS at 08:49

## 2024-01-18 RX ADMIN — CARVEDILOL 25 MG: 25 TABLET, FILM COATED ORAL at 09:13

## 2024-01-18 RX ADMIN — KETOROLAC TROMETHAMINE 1 DROP: 5 SOLUTION OPHTHALMIC at 12:17

## 2024-01-18 RX ADMIN — HYDROCHLOROTHIAZIDE 12.5 MG: 12.5 TABLET ORAL at 17:23

## 2024-01-18 RX ADMIN — OXYCODONE 5 MG: 5 TABLET ORAL at 00:45

## 2024-01-18 RX ADMIN — LISINOPRIL 20 MG: 20 TABLET ORAL at 09:13

## 2024-01-18 RX ADMIN — LIDOCAINE 5% 2 PATCH: 700 PATCH TOPICAL at 09:13

## 2024-01-18 RX ADMIN — CARVEDILOL 25 MG: 25 TABLET, FILM COATED ORAL at 17:23

## 2024-01-18 RX ADMIN — AMPICILLIN AND SULBACTAM 3 G: 2; 1 INJECTION, POWDER, FOR SOLUTION INTRAVENOUS at 21:15

## 2024-01-18 RX ADMIN — AMPICILLIN AND SULBACTAM 3 G: 2; 1 INJECTION, POWDER, FOR SOLUTION INTRAVENOUS at 09:13

## 2024-01-18 RX ADMIN — OXYCODONE HYDROCHLORIDE 10 MG: 10 TABLET ORAL at 21:23

## 2024-01-18 RX ADMIN — AMPICILLIN AND SULBACTAM 3 G: 2; 1 INJECTION, POWDER, FOR SOLUTION INTRAVENOUS at 14:13

## 2024-01-18 RX ADMIN — LISINOPRIL 20 MG: 20 TABLET ORAL at 17:23

## 2024-01-18 RX ADMIN — AMPICILLIN AND SULBACTAM 3 G: 2; 1 INJECTION, POWDER, FOR SOLUTION INTRAVENOUS at 02:44

## 2024-01-18 NOTE — PLAN OF CARE
Problem: PAIN - ADULT  Goal: Verbalizes/displays adequate comfort level or baseline comfort level  Description: Interventions:  - Encourage patient to monitor pain and request assistance  - Assess pain using appropriate pain scale  - Administer analgesics based on type and severity of pain and evaluate response  - Implement non-pharmacological measures as appropriate and evaluate response  - Consider cultural and social influences on pain and pain management  - Notify physician/advanced practitioner if interventions unsuccessful or patient reports new pain  Outcome: Progressing     Problem: INFECTION - ADULT  Goal: Absence or prevention of progression during hospitalization  Description: INTERVENTIONS:  - Assess and monitor for signs and symptoms of infection  - Monitor lab/diagnostic results  - Monitor all insertion sites, i.e. indwelling lines, tubes, and drains  - Monitor endotracheal if appropriate and nasal secretions for changes in amount and color  - [place] appropriate cooling/warming therapies per order  - Administer medications as ordered  - Instruct and encourage patient and family to use good hand hygiene technique  - Identify and instruct in appropriate isolation precautions for identified infection/condition  Outcome: Progressing     Problem: SAFETY ADULT  Goal: Patient will remain free of falls  Description: INTERVENTIONS:  - Educate patient/family on patient safety including physical limitations  - Instruct patient to call for assistance with activity   - Consult OT/PT to assist with strengthening/mobility   - Keep Call bell within reach  - Keep bed low and locked with side rails adjusted as appropriate  - Keep care items and personal belongings within reach  - Initiate and maintain comfort rounds  - Make Fall Risk Sign visible to staff  - Offer Toileting every 2 Hours, in advance of need  - Initiate/Maintain bed alarm  - Obtain necessary fall risk management equipment: alarm   - Apply yellow  socks and bracelet for high fall risk patients  - Consider moving patient to room near nurses station  Outcome: Progressing  Goal: Maintain or return to baseline ADL function  Description: INTERVENTIONS:  -  Assess patient's ability to carry out ADLs; assess patient's baseline for ADL function and identify physical deficits which impact ability to perform ADLs (bathing, care of mouth/teeth, toileting, grooming, dressing, etc.)  - Assess/evaluate cause of self-care deficits   - Assess range of motion  - Assess patient's mobility; develop plan if impaired  - Assess patient's need for assistive devices and provide as appropriate  - Encourage maximum independence but intervene and supervise when necessary  - Involve family in performance of ADLs  - Assess for home care needs following discharge   - Consider OT consult to assist with ADL evaluation and planning for discharge  - Provide patient education as appropriate  Outcome: Progressing  Goal: Maintains/Returns to pre admission functional level  Description: INTERVENTIONS:  - Perform AM-PAC 6 Click Basic Mobility/ Daily Activity assessment daily.  - Set and communicate daily mobility goal to care team and patient/family/caregiver.   - Collaborate with rehabilitation services on mobility goals if consulted  - Perform Range of Motion 4 times a day.  - Reposition patient every 2 hours.  - Dangle patient 3 times a day  - Stand patient 3 times a day  - Ambulate patient 3 times a day  - Out of bed to chair 3 times a day   - Out of bed for meals 3 times a day  - Out of bed for toileting  - Record patient progress and toleration of activity level   Outcome: Progressing     Problem: DISCHARGE PLANNING  Goal: Discharge to home or other facility with appropriate resources  Description: INTERVENTIONS:  - Identify barriers to discharge w/patient and caregiver  - Arrange for needed discharge resources and transportation as appropriate  - Identify discharge learning needs (meds,  wound care, etc.)  - Arrange for interpretive services to assist at discharge as needed  - Refer to Case Management Department for coordinating discharge planning if the patient needs post-hospital services based on physician/advanced practitioner order or complex needs related to functional status, cognitive ability, or social support system  Outcome: Progressing     Problem: Knowledge Deficit  Goal: Patient/family/caregiver demonstrates understanding of disease process, treatment plan, medications, and discharge instructions  Description: Complete learning assessment and assess knowledge base.  Interventions:  - Provide teaching at level of understanding  - Provide teaching via preferred learning methods  Outcome: Progressing     Problem: SKIN/TISSUE INTEGRITY - ADULT  Goal: Skin Integrity remains intact(Skin Breakdown Prevention)  Description: Assess:  -Perform Malachi assessment every shfit  -Clean and moisturize skin every shift   -Inspect skin when repositioning, toileting, and assisting with ADLS  -Assess under medical devices such as iv every shift   -Assess extremities for adequate circulation and sensation     Bed Management:  -Have minimal linens on bed & keep smooth, unwrinkled  -Change linens as needed when moist or perspiring  -Avoid sitting or lying in one position for more than 2 hours while in bed  -Keep HOB at 30 degrees     Toileting:  -Offer bedside commode  -Assess for incontinence every shift   -Use incontinent care products after each incontinent episode such as foam cleanser     Activity:  -Mobilize patient 3 times a day  -Encourage activity and walks on unit  -Encourage or provide ROM exercises   -Turn and reposition patient every 2 Hours  -Use appropriate equipment to lift or move patient in bed  -Instruct/ Assist with weight shifting every shift when out of bed in chair  -Consider limitation of chair time 2 hour intervals    Skin Care:  -Avoid use of baby powder, tape, friction and  shearing, hot water or constrictive clothing  -Relieve pressure over bony prominences using pillows   -Do not massage red bony areas    Next Steps:  -Teach patient strategies to minimize risks such as wounds    -Consider consults to  interdisciplinary teams such as wound care   Outcome: Progressing  Goal: Incision(s), wounds(s) or drain site(s) healing without S/S of infection  Description: INTERVENTIONS  - Assess and document dressing, incision, wound bed, drain sites and surrounding tissue  - Provide patient and family education  - Perform skin care/dressing changes every shift   Outcome: Progressing  Goal: Pressure injury heals and does not worsen  Description: Interventions:  - Implement low air loss mattress or specialty surface (Criteria met)  - Apply silicone foam dressing  - Instruct/assist with weight shifting every 60  minutes when in chair   - Limit chair time to 2 hour intervals  - Use special pressure reducing interventions such as pillows  when in chair   - Apply fecal or urinary incontinence containment device   - Perform passive or active ROM every shift  - Turn and reposition patient & offload bony prominences every  2 hours   - Utilize friction reducing device or surface for transfers   - Consider consults to  interdisciplinary teams such as wounds   - Use incontinent care products after each incontinent episode such as foam cleasner   - Consider nutrition services referral as needed  Outcome: Progressing

## 2024-01-18 NOTE — ASSESSMENT & PLAN NOTE
Presented w/ left hand cellulitis, swelling, & drainage   Sustained a bite by his hunt retriever on Saturday. Wound worsened and developed increased swelling and erythema and pain  On admissions, hand surgery recommended to open puncture wounds at the bedside which was performed by emergency medicine.  3 times daily soaks with warm soapy water   Ct upper extremity showing soft tissue laceration and subcutaneous edema with skin thickening along the dorsal thenar compartment of the hand likely cellulitis. No well-defined abscess or soft tissue gas.  Continue IV Unasyn day 4  Hand surgery following will re-evaluate in AM for possible operative intervention  Under went I&D 1/16/24, culture taken from wound today

## 2024-01-18 NOTE — PROGRESS NOTES
S: Continuing to slowly improve, but still having tenderness mostly dorsally. He has been doing the soaks as directed. States some fluid is coming out with the soaks. Denies fevers or chills.    O:  AVSS, afebrile  Left hand:  I&D site on the dorsal radial hand/thumb remains partially open.  There is still surrounding erythema but it continues to slowly improve from yesterday.  Improved swelling.   about the I&D site similar to yesterday.  Small amount of purulence at distal aspect of incision.  The palmar incision is mostly closed today. Minimally tender today no expressible purulence or drainage noted.  No significant erythema palmarly.  Able to flex and extend the thumb with minimal discomfort.  Nearly able to make a loose fist, limited by some swelling and continued discomfort  Sensation intact on the dorsal and palmar thumb to light touch  Warm well-perfused fingertips    A/P:  58-year-old male with a left hand infection following a dog bite to the dorsal radial hand/thumb and thenar eminence.  He underwent a bedside I&D on 1/16/2024 where purulence was evacuated both dorsally and palmarly.  The wounds were left open and he was started on 3 times daily soaks and continued on IV Unasyn for antibiotics.  Overall today he continues to slowly improve on antibiotics.  His erythema is improving dorsally and volarly. I think his slower resolution is likely related to his diabetes. He does seems to be improving on Unasyn but a wound culture was taken today at the area of purulence and the wound slightly opened again dorsally to help confirm/tailor abx. No palpable fluid collections.  I do not think he needs operative intervention today but we should continue keep a close eye on this.    -He can have a diet today but please make n.p.o. at midnight for possible operative intervention tomorrow  -Continue 3 times daily soaks and warm soapy water, encouraged the patient to move his fingers as much as tolerated  while doing the soaks and to use his contralateral hand to express any remaining purulence.  - Continue IV Unasyn, will follow wound cx  - Recommend at CT scan today to look for addition fluid collections that may need to be evacuated.   -Ortho hand will continue to follow    Carroll Mccarthy MD

## 2024-01-18 NOTE — ASSESSMENT & PLAN NOTE
Lab Results   Component Value Date    EGFR 55 01/18/2024    EGFR 70 01/17/2024    EGFR 65 01/16/2024    CREATININE 1.37 (H) 01/18/2024    CREATININE 1.13 01/17/2024    CREATININE 1.19 01/16/2024     Elevation in kidney function today not meeting VIVIANA criteria   Continue to monitor   Avoid nephrotoxins

## 2024-01-18 NOTE — ASSESSMENT & PLAN NOTE
Lab Results   Component Value Date    HGBA1C 6.7 (H) 11/28/2023     Recent Labs     01/17/24  0546 01/18/24  0004 01/18/24  0602 01/18/24  1221   POCGLU 117 160* 109 174*         Blood Sugar Average: Last 72 hrs:  (P) 156.2    SSI w/ meals & qhs   Home Jardiance on hold

## 2024-01-18 NOTE — PROGRESS NOTES
"[facility]  Progress Note  Name: Yoni Castillo I  MRN: [id number]  Unit/Bed#: [place] [address] -02 I Date of Admission: 1/15/2024   Date of Service: 1/18/2024 I Hospital Day: 3    Assessment/Plan   * Cellulitis of hand  Assessment & Plan  Presented w/ left hand cellulitis, swelling, & drainage   Sustained a bite by his hunt retriever on Saturday. Wound worsened and developed increased swelling and erythema and pain  On admissions, hand surgery recommended to open puncture wounds at the bedside which was performed by emergency medicine.  3 times daily soaks with warm soapy water   Ct upper extremity showing soft tissue laceration and subcutaneous edema with skin thickening along the dorsal thenar compartment of the hand likely cellulitis. No well-defined abscess or soft tissue gas.  Continue IV Unasyn day 4  Hand surgery following will re-evaluate in AM for possible operative intervention  Under went I&D 1/16/24, culture taken from wound today      Essential hypertension  Assessment & Plan  Continue home medications with hold parameters  BP acceptable     Continuous opioid dependence (HCC)  Assessment & Plan  Utilizes tramadol  PDMP meeting attending; \"no red flags\"    Chronic kidney disease, stage 3 (HCC)  Assessment & Plan  Lab Results   Component Value Date    EGFR 55 01/18/2024    EGFR 70 01/17/2024    EGFR 65 01/16/2024    CREATININE 1.37 (H) 01/18/2024    CREATININE 1.13 01/17/2024    CREATININE 1.19 01/16/2024     Elevation in kidney function today not meeting VIVIANA criteria   Continue to monitor   Avoid nephrotoxins    Type 2 diabetes mellitus with chronic kidney disease, without long-term current use of insulin ([facility])  Assessment & Plan  Lab Results   Component Value Date    HGBA1C 6.7 (H) 11/28/2023     Recent Labs     01/17/24  0546 01/18/24  0004 01/18/24  0602 01/18/24  1221   POCGLU 117 160* 109 174*         Blood Sugar Average: Last 72 hrs:  (P) 156.2    SSI w/ meals & qhs "   Home Jardiance on hold     Lumbar radiculopathy  Assessment & Plan  Chronically maintained on tramadol, on hold   As needed analgesia while inpatient         VTE Pharmacologic Prophylaxis: VTE Score: 3 Moderate Risk (Score 3-4) - Pharmacological DVT Prophylaxis Ordered: heparin.    Mobility:   Basic Mobility Inpatient Raw Score: 24  JH-HLM Goal: 8: Walk 250 feet or more  JH-HLM Achieved: 1: Laying in bed  HLM Goal achieved. Continue to encourage appropriate mobility.    Patient Centered Rounds: I performed bedside rounds with nursing staff today.   Discussions with Specialists or Other Care Team Provider: charlotte    Education and Discussions with Family / Patient: Patient declined call to .     Total Time Spent on Date of Encounter in care of patient: 30 mins. This time was spent on one or more of the following: performing physical exam; counseling and coordination of care; obtaining or reviewing history; documenting in the medical record; reviewing/ordering tests, medications or procedures; communicating with other healthcare professionals and discussing with patient's family/caregivers.    Current Length of Stay: 3 day(s)  Current Patient Status: Inpatient   Certification Statement: The patient will continue to require additional inpatient hospital stay due to hand cellulitis requiring close monitoring and IV antibiotics   Discharge Plan: Anticipate discharge in 24-48 hrs to home.    Code Status: Level 3 - DNAR and DNI    Subjective:   Patient was seen laying in bed today. He reports more pain in his hand today. He denies any other acute complaints     Objective:     Vitals:   Temp (24hrs), Av.5 °F (36.9 °C), Min:98.5 °F (36.9 °C), Max:98.5 °F (36.9 °C)    Temp:  [98.5 °F (36.9 °C)] 98.5 °F (36.9 °C)  HR:  [72-78] 76  Resp:  [18] 18  BP: (125)/(79-81) 125/81  SpO2:  [94 %-96 %] 94 %  Body mass index is 36.69 kg/m².     Input and Output Summary (last 24 hours):   No intake or output data in the 24  hours ending 01/18/24 1249    Physical Exam:   Physical Exam  Vitals and nursing note reviewed.   Constitutional:       Appearance: Normal appearance.   HENT:      Head: Normocephalic and atraumatic.   Eyes:      General: No scleral icterus.  Cardiovascular:      Rate and Rhythm: Normal rate and regular rhythm.   Pulmonary:      Effort: Pulmonary effort is normal.      Breath sounds: Normal breath sounds.   Abdominal:      General: Abdomen is flat. Bowel sounds are normal.      Palpations: Abdomen is soft.   Skin:     General: Skin is warm and dry.      Comments: Left hand in guaze dressing. Sensation intact to light touch. Improved ROM compared to yesterday in wrist and thumb   Neurological:      Mental Status: He is alert. Mental status is at baseline.   Psychiatric:         Mood and Affect: Mood normal.         Behavior: Behavior normal.       Additional Data:     Labs:  Results from last 7 days   Lab Units 01/18/24  0451   WBC Thousand/uL 4.29*   HEMOGLOBIN g/dL 11.7*   HEMATOCRIT % 35.1*   PLATELETS Thousands/uL 218   NEUTROS PCT % 50   LYMPHS PCT % 33   MONOS PCT % 12   EOS PCT % 4     Results from last 7 days   Lab Units 01/18/24  0451 01/16/24  0456 01/15/24  1402   SODIUM mmol/L 137   < > 133*   POTASSIUM mmol/L 4.4   < > 4.6   CHLORIDE mmol/L 107   < > 102   CO2 mmol/L 22   < > 23   BUN mg/dL 37*   < > 41*   CREATININE mg/dL 1.37*   < > 1.43*   ANION GAP mmol/L 8   < > 8   CALCIUM mg/dL 8.6   < > 9.7   ALBUMIN g/dL  --   --  4.2   TOTAL BILIRUBIN mg/dL  --   --  0.43   ALK PHOS U/L  --   --  52   ALT U/L  --   --  19   AST U/L  --   --  15   GLUCOSE RANDOM mg/dL 118   < > 162*    < > = values in this interval not displayed.         Results from last 7 days   Lab Units 01/18/24  1221 01/18/24  0602 01/18/24  0004 01/17/24  0546 01/16/24  2356 01/16/24  1705 01/16/24  1110 01/16/24  0550 01/15/24  2130 01/15/24  1618   POC GLUCOSE mg/dl 174* 109 160* 117 164* 176* 218* 127 156* 161*                Lines/Drains:  Invasive Devices       Peripheral Intravenous Line  Duration             Peripheral IV 01/15/24 Proximal;Right;Ventral (anterior) Forearm 2 days                          Imaging: Reviewed radiology reports from this admission including: CT left upper extremity     Recent Cultures (last 7 days):         Last 24 Hours Medication List:   Current Facility-Administered Medications   Medication Dose Route Frequency Provider Last Rate    acetaminophen  650 mg Oral Q6H PRN Sean Morgan DO      aluminum-magnesium hydroxide-simethicone  30 mL Oral Q6H PRN Sean Morgan DO      ampicillin-sulbactam  3 g Intravenous Q6H Sean Morgan, DO 3 g (01/18/24 0913)    carvedilol  25 mg Oral BID With Meals Sean Morgan DO      heparin (porcine)  5,000 Units Subcutaneous Q8H [facility] Sean Morgan DO      lisinopril  20 mg Oral BID Sean Morgan DO      And    hydrochlorothiazide  12.5 mg Oral BID Sean Morgan DO      insulin lispro  1-6 Units Subcutaneous Q6H Jose Alfredo Noel PA-C      ketorolac  1 drop Both Eyes 4x Daily PRN Yesenia Turner PA-C      lidocaine  2 patch Topical Daily Sean Morgan DO      ondansetron  4 mg Intravenous Q6H PRN Sean Morgan DO      oxyCODONE  10 mg Oral Q4H PRN Yesenia Turner PA-C      oxyCODONE  5 mg Oral Q6H PRN Luke Wallace DO          Today, Patient Was Seen By: Jasmin Alvarez PA-C    **Please Note: This note may have been constructed using a voice recognition system.**

## 2024-01-18 NOTE — PLAN OF CARE
Problem: PAIN - ADULT  Goal: Verbalizes/displays adequate comfort level or baseline comfort level  Description: Interventions:  - Encourage patient to monitor pain and request assistance  - Assess pain using appropriate pain scale  - Administer analgesics based on type and severity of pain and evaluate response  - Implement non-pharmacological measures as appropriate and evaluate response  - Consider cultural and social influences on pain and pain management  - Notify physician/advanced practitioner if interventions unsuccessful or patient reports new pain  Outcome: Progressing     Problem: INFECTION - ADULT  Goal: Absence or prevention of progression during hospitalization  Description: INTERVENTIONS:  - Assess and monitor for signs and symptoms of infection  - Monitor lab/diagnostic results  - Monitor all insertion sites, i.e. indwelling lines, tubes, and drains  - Monitor endotracheal if appropriate and nasal secretions for changes in amount and color  - [place] appropriate cooling/warming therapies per order  - Administer medications as ordered  - Instruct and encourage patient and family to use good hand hygiene technique  - Identify and instruct in appropriate isolation precautions for identified infection/condition  Outcome: Progressing     Problem: SAFETY ADULT  Goal: Patient will remain free of falls  Description: INTERVENTIONS:  - Educate patient/family on patient safety including physical limitations  - Instruct patient to call for assistance with activity   - Consult OT/PT to assist with strengthening/mobility   - Keep Call bell within reach  - Keep bed low and locked with side rails adjusted as appropriate  - Keep care items and personal belongings within reach  - Initiate and maintain comfort rounds  - Make Fall Risk Sign visible to staff  - Offer Toileting   - Initiate/Maintain alarm  - Obtain necessary fall risk management equipment  - Apply yellow socks and bracelet for high fall risk  patients  - Consider moving patient to room near nurses station  Outcome: Progressing  Goal: Maintain or return to baseline ADL function  Description: INTERVENTIONS:  -  Assess patient's ability to carry out ADLs; assess patient's baseline for ADL function and identify physical deficits which impact ability to perform ADLs (bathing, care of mouth/teeth, toileting, grooming, dressing, etc.)  - Assess/evaluate cause of self-care deficits   - Assess range of motion  - Assess patient's mobility; develop plan if impaired  - Assess patient's need for assistive devices and provide as appropriate  - Encourage maximum independence but intervene and supervise when necessary  - Involve family in performance of ADLs  - Assess for home care needs following discharge   - Consider OT consult to assist with ADL evaluation and planning for discharge  - Provide patient education as appropriate  Outcome: Progressing  Goal: Maintains/Returns to pre admission functional level  Description: INTERVENTIONS:  - Perform AM-PAC 6 Click Basic Mobility/ Daily Activity assessment daily.  - Set and communicate daily mobility goal to care team and patient/family/caregiver.   - Collaborate with rehabilitation services on mobility goals if consulted  - Perform Range of Motion   - Reposition patient   - Dangle patient   - Stand patient   - Ambulate patient   - Out of bed to chair    - Out of bed for meals   - Out of bed for toileting  - Record patient progress and toleration of activity level   Outcome: Progressing     Problem: DISCHARGE PLANNING  Goal: Discharge to home or other facility with appropriate resources  Description: INTERVENTIONS:  - Identify barriers to discharge w/patient and caregiver  - Arrange for needed discharge resources and transportation as appropriate  - Identify discharge learning needs (meds, wound care, etc.)  - Arrange for interpretive services to assist at discharge as needed  - Refer to Case Management Department for  coordinating discharge planning if the patient needs post-hospital services based on physician/advanced practitioner order or complex needs related to functional status, cognitive ability, or social support system  Outcome: Progressing     Problem: Knowledge Deficit  Goal: Patient/family/caregiver demonstrates understanding of disease process, treatment plan, medications, and discharge instructions  Description: Complete learning assessment and assess knowledge base.  Interventions:  - Provide teaching at level of understanding  - Provide teaching via preferred learning methods  Outcome: Progressing     Problem: SKIN/TISSUE INTEGRITY - ADULT  Goal: Skin Integrity remains intact(Skin Breakdown Prevention)  Description: Assess:  -Perform Malachi assessment  -Clean and moisturize skin   -Inspect skin when repositioning, toileting, and assisting with ADLS  -Assess under medical devices   -Assess extremities for adequate circulation and sensation     Bed Management:  -Have minimal linens on bed & keep smooth, unwrinkled  -Change linens as needed when moist or perspiring  -Avoid sitting or lying in one position    Toileting:  -Offer bedside commode  -Assess for incontinence   -Use incontinent care products after each incontinent episode     Activity:  -Mobilize patient  -Encourage activity and walks on unit  -Encourage or provide ROM exercises   -Turn and reposition patient  -Use appropriate equipment to lift or move patient in bed  -Instruct/ Assist with weight shifting   -Consider limitation of chair time    Skin Care:  -Avoid use of baby powder, tape, friction and shearing, hot water or constrictive clothing  -Relieve pressure over bony prominences  -Do not massage red bony areas    Next Steps:  -Teach patient strategies to minimize risks   -Consider consults to  interdisciplinary teams  Outcome: Progressing  Goal: Incision(s), wounds(s) or drain site(s) healing without S/S of infection  Description: INTERVENTIONS  -  Assess and document dressing, incision, wound bed, drain sites and surrounding tissue  - Provide patient and family education  - Perform skin care/dressing changes   Outcome: Progressing  Goal: Pressure injury heals and does not worsen  Description: Interventions:  - Implement low air loss mattress or specialty surface (Criteria met)  - Apply silicone foam dressing  - Instruct/assist with weight shifting  - Limit chair time  - Use special pressure reducing interventions  - Apply fecal or urinary incontinence containment device   - Perform passive or active ROM  - Turn and reposition patient & offload bony prominences  - Utilize friction reducing device or surface for transfers   - Consider consults to  interdisciplinary team  - Use incontinent care products after each incontinent episode  - Consider nutrition services referral as needed  Outcome: Progressing

## 2024-01-19 LAB
ANION GAP SERPL CALCULATED.3IONS-SCNC: 8 MMOL/L
BUN SERPL-MCNC: 38 MG/DL (ref 5–25)
CALCIUM SERPL-MCNC: 8.5 MG/DL (ref 8.4–10.2)
CHLORIDE SERPL-SCNC: 103 MMOL/L (ref 96–108)
CO2 SERPL-SCNC: 23 MMOL/L (ref 21–32)
CREAT SERPL-MCNC: 1.43 MG/DL (ref 0.6–1.3)
ERYTHROCYTE [DISTWIDTH] IN BLOOD BY AUTOMATED COUNT: 13 % (ref 11.6–15.1)
GFR SERPL CREATININE-BSD FRML MDRD: 52 ML/MIN/1.73SQ M
GLUCOSE SERPL-MCNC: 131 MG/DL (ref 65–140)
GLUCOSE SERPL-MCNC: 146 MG/DL (ref 65–140)
GLUCOSE SERPL-MCNC: 150 MG/DL (ref 65–140)
GLUCOSE SERPL-MCNC: 198 MG/DL (ref 65–140)
GLUCOSE SERPL-MCNC: 213 MG/DL (ref 65–140)
HCT VFR BLD AUTO: 33.6 % (ref 36.5–49.3)
HGB BLD-MCNC: 11.2 G/DL (ref 12–17)
MCH RBC QN AUTO: 30.5 PG (ref 26.8–34.3)
MCHC RBC AUTO-ENTMCNC: 33.3 G/DL (ref 31.4–37.4)
MCV RBC AUTO: 92 FL (ref 82–98)
PLATELET # BLD AUTO: 223 THOUSANDS/UL (ref 149–390)
PMV BLD AUTO: 9.7 FL (ref 8.9–12.7)
POTASSIUM SERPL-SCNC: 4.3 MMOL/L (ref 3.5–5.3)
RBC # BLD AUTO: 3.67 MILLION/UL (ref 3.88–5.62)
SODIUM SERPL-SCNC: 134 MMOL/L (ref 135–147)
WBC # BLD AUTO: 3.9 THOUSAND/UL (ref 4.31–10.16)

## 2024-01-19 PROCEDURE — 85027 COMPLETE CBC AUTOMATED: CPT | Performed by: INTERNAL MEDICINE

## 2024-01-19 PROCEDURE — 99024 POSTOP FOLLOW-UP VISIT: CPT | Performed by: ORTHOPAEDIC SURGERY

## 2024-01-19 PROCEDURE — 82948 REAGENT STRIP/BLOOD GLUCOSE: CPT

## 2024-01-19 PROCEDURE — 99232 SBSQ HOSP IP/OBS MODERATE 35: CPT | Performed by: INTERNAL MEDICINE

## 2024-01-19 PROCEDURE — 80048 BASIC METABOLIC PNL TOTAL CA: CPT | Performed by: INTERNAL MEDICINE

## 2024-01-19 RX ORDER — SACCHAROMYCES BOULARDII 250 MG
250 CAPSULE ORAL 2 TIMES DAILY
Status: DISCONTINUED | OUTPATIENT
Start: 2024-01-19 | End: 2024-01-20 | Stop reason: HOSPADM

## 2024-01-19 RX ORDER — AMOXICILLIN AND CLAVULANATE POTASSIUM 875; 125 MG/1; MG/1
1 TABLET, FILM COATED ORAL EVERY 12 HOURS SCHEDULED
Status: DISCONTINUED | OUTPATIENT
Start: 2024-01-19 | End: 2024-01-19

## 2024-01-19 RX ADMIN — SODIUM CHLORIDE 3 G: 9 INJECTION, SOLUTION INTRAVENOUS at 20:39

## 2024-01-19 RX ADMIN — CARVEDILOL 25 MG: 25 TABLET, FILM COATED ORAL at 16:56

## 2024-01-19 RX ADMIN — CARVEDILOL 25 MG: 25 TABLET, FILM COATED ORAL at 08:47

## 2024-01-19 RX ADMIN — AMPICILLIN AND SULBACTAM 3 G: 2; 1 INJECTION, POWDER, FOR SOLUTION INTRAVENOUS at 14:12

## 2024-01-19 RX ADMIN — AMPICILLIN AND SULBACTAM 3 G: 2; 1 INJECTION, POWDER, FOR SOLUTION INTRAVENOUS at 08:47

## 2024-01-19 RX ADMIN — LISINOPRIL 20 MG: 20 TABLET ORAL at 08:47

## 2024-01-19 RX ADMIN — LIDOCAINE 5% 2 PATCH: 700 PATCH TOPICAL at 08:45

## 2024-01-19 RX ADMIN — KETOROLAC TROMETHAMINE 1 DROP: 5 SOLUTION OPHTHALMIC at 23:59

## 2024-01-19 RX ADMIN — HYDROCHLOROTHIAZIDE 12.5 MG: 12.5 TABLET ORAL at 08:47

## 2024-01-19 RX ADMIN — KETOROLAC TROMETHAMINE 1 DROP: 5 SOLUTION OPHTHALMIC at 11:48

## 2024-01-19 RX ADMIN — AMPICILLIN AND SULBACTAM 3 G: 2; 1 INJECTION, POWDER, FOR SOLUTION INTRAVENOUS at 02:37

## 2024-01-19 NOTE — ASSESSMENT & PLAN NOTE
Presented w/ left hand cellulitis, swelling, & drainage   Sustained a bite by his hunt retriever on Saturday. Wound worsened and developed increased swelling and erythema and pain  On admissions, hand surgery recommended to open puncture wounds at the bedside which was performed by emergency medicine.   Repeat Ct upper extremity showing soft tissue laceration and subcutaneous edema with skin thickening along the dorsal thenar compartment of the hand likely cellulitis. No well-defined abscess or soft tissue gas.  Under went I&D 1/16/24  Wound culture taken with no bacterial growth   Continue IV Unasyn day 5, transition to oral augmentin at discharge    Continue 3 times daily soaks with warm soapy water   Hand surgery following cleared for discharge will follow up next week

## 2024-01-19 NOTE — ASSESSMENT & PLAN NOTE
Lab Results   Component Value Date    EGFR 52 01/19/2024    EGFR 55 01/18/2024    EGFR 70 01/17/2024    CREATININE 1.43 (H) 01/19/2024    CREATININE 1.37 (H) 01/18/2024    CREATININE 1.13 01/17/2024     Elevation in kidney function meeting VIVIANA criteria   Creatinine is still within outpatient baseline, fluctuates outpatient    Held home lisinopril and hydrocholorthiazide   Encourage po intake

## 2024-01-19 NOTE — PROGRESS NOTES
formerly [facility]  Progress Note  Name: Yoni Castillo I  MRN: [id number]  Unit/Bed#: [place] [address] -02 I Date of Admission: 1/15/2024   Date of Service: 1/19/2024 I Hospital Day: 4    Assessment/Plan   * Cellulitis of hand  Assessment & Plan  Presented w/ left hand cellulitis, swelling, & drainage   Sustained a bite by his hunt retriever on Saturday. Wound worsened and developed increased swelling and erythema and pain  On admissions, hand surgery recommended to open puncture wounds at the bedside which was performed by emergency medicine.   Repeat Ct upper extremity showing soft tissue laceration and subcutaneous edema with skin thickening along the dorsal thenar compartment of the hand likely cellulitis. No well-defined abscess or soft tissue gas.  Under went I&D 1/16/24  Wound culture taken with no bacterial growth   Continue IV Unasyn day 5, transition to oral augmentin at discharge    Continue 3 times daily soaks with warm soapy water   Hand surgery following cleared for discharge will follow up next week      Essential hypertension  Assessment & Plan  Held home lisinopril and hydrochlorothiazide due to elevated creatinine   Continue coreg    Chronic kidney disease, stage 3 (HCC)  Assessment & Plan  Lab Results   Component Value Date    EGFR 52 01/19/2024    EGFR 55 01/18/2024    EGFR 70 01/17/2024    CREATININE 1.43 (H) 01/19/2024    CREATININE 1.37 (H) 01/18/2024    CREATININE 1.13 01/17/2024     Elevation in kidney function meeting VIVIANA criteria   Creatinine is still within outpatient baseline, fluctuates outpatient    Held home lisinopril and hydrocholorthiazide   Encourage po intake     Type 2 diabetes mellitus with chronic kidney disease, without long-term current use of insulin (HCC)  Assessment & Plan  Lab Results   Component Value Date    HGBA1C 6.7 (H) 11/28/2023     Recent Labs     01/18/24  1221 01/18/24  1606 01/19/24  0013 01/19/24  0556   POCGLU 174* 119 198* 146*          Blood Sugar Average: Last 72 hrs:  (P) 155.8079502545759725    SSI w/ meals & qhs   Home Jardiance on hold     Lumbar radiculopathy  Assessment & Plan  Chronically maintained on tramadol, on hold   As needed analgesia while inpatient             VTE Pharmacologic Prophylaxis: VTE Score: 3 Moderate Risk (Score 3-4) - Pharmacological DVT Prophylaxis Ordered: heparin.    Mobility:   Basic Mobility Inpatient Raw Score: 24  JH-HLM Goal: 8: Walk 250 feet or more  JH-HLM Achieved: 8: Walk 250 feet ot more  HLM Goal achieved. Continue to encourage appropriate mobility.    Patient Centered Rounds: I performed bedside rounds with nursing staff today.   Discussions with Specialists or Other Care Team Provider: cm    Education and Discussions with Family / Patient: Patient declined call to .     Total Time Spent on Date of Encounter in care of patient: 30 mins. This time was spent on one or more of the following: performing physical exam; counseling and coordination of care; obtaining or reviewing history; documenting in the medical record; reviewing/ordering tests, medications or procedures; communicating with other healthcare professionals and discussing with patient's family/caregivers.    Current Length of Stay: 4 day(s)  Current Patient Status: Inpatient   Certification Statement: The patient will continue to require additional inpatient hospital stay due to pending transition to oral antibiotics tomorrow   Discharge Plan: Anticipate discharge tomorrow to home.    Code Status: Level 3 - DNAR and DNI    Subjective:   Patient was seen laying in bed today. Pain in hand is much improved. He reports feeling much better. He has better rom in hand.     Objective:     Vitals:   Temp (24hrs), Av °F (36.7 °C), Min:98 °F (36.7 °C), Max:98.1 °F (36.7 °C)    Temp:  [98 °F (36.7 °C)-98.1 °F (36.7 °C)] 98 °F (36.7 °C)  HR:  [68-76] 71  Resp:  [16-18] 17  BP: (146-149)/(91-92) 146/92  SpO2:  [94 %-95 %] 94  %  Body mass index is 36.69 kg/m².     Input and Output Summary (last 24 hours):   No intake or output data in the 24 hours ending 01/19/24 1113    Physical Exam:   Physical Exam  Vitals and nursing note reviewed.   Constitutional:       Appearance: Normal appearance.   HENT:      Head: Normocephalic and atraumatic.   Eyes:      General: No scleral icterus.  Cardiovascular:      Rate and Rhythm: Normal rate and regular rhythm.   Pulmonary:      Breath sounds: Normal breath sounds.   Abdominal:      General: Abdomen is flat. Bowel sounds are normal.      Palpations: Abdomen is soft.   Musculoskeletal:      Right lower leg: No edema.      Left lower leg: No edema.      Comments: Decreased range of motion in the wrist and left thumb   Skin:     General: Skin is warm and dry.      Comments: Swelling of left hand around dorsum mild erythema around open areas mild drainage   Neurological:      Mental Status: He is alert. Mental status is at baseline.   Psychiatric:         Mood and Affect: Mood normal.         Behavior: Behavior normal.         Additional Data:     Labs:  Results from last 7 days   Lab Units 01/19/24  0438 01/18/24  0451   WBC Thousand/uL 3.90* 4.29*   HEMOGLOBIN g/dL 11.2* 11.7*   HEMATOCRIT % 33.6* 35.1*   PLATELETS Thousands/uL 223 218   NEUTROS PCT %  --  50   LYMPHS PCT %  --  33   MONOS PCT %  --  12   EOS PCT %  --  4     Results from last 7 days   Lab Units 01/19/24  0438 01/16/24  0456 01/15/24  1402   SODIUM mmol/L 134*   < > 133*   POTASSIUM mmol/L 4.3   < > 4.6   CHLORIDE mmol/L 103   < > 102   CO2 mmol/L 23   < > 23   BUN mg/dL 38*   < > 41*   CREATININE mg/dL 1.43*   < > 1.43*   ANION GAP mmol/L 8   < > 8   CALCIUM mg/dL 8.5   < > 9.7   ALBUMIN g/dL  --   --  4.2   TOTAL BILIRUBIN mg/dL  --   --  0.43   ALK PHOS U/L  --   --  52   ALT U/L  --   --  19   AST U/L  --   --  15   GLUCOSE RANDOM mg/dL 150*   < > 162*    < > = values in this interval not displayed.         Results from last 7 days    Lab Units 01/19/24  0556 01/19/24  0013 01/18/24  1606 01/18/24  1221 01/18/24  0602 01/18/24  0004 01/17/24  0546 01/16/24  2356 01/16/24  1705 01/16/24  1110 01/16/24  0550 01/15/24  2130   POC GLUCOSE mg/dl 146* 198* 119 174* 109 160* 117 164* 176* 218* 127 156*               Lines/Drains:  Invasive Devices       Peripheral Intravenous Line  Duration             Peripheral IV 01/19/24 Dorsal (posterior);Right Forearm <1 day                          Imaging: Reviewed radiology reports from this admission including: Left upper extremity    Recent Cultures (last 7 days):   Results from last 7 days   Lab Units 01/18/24  0724   GRAM STAIN RESULT  No Polys or Bacteria seen   WOUND CULTURE  No growth       Last 24 Hours Medication List:   Current Facility-Administered Medications   Medication Dose Route Frequency Provider Last Rate    acetaminophen  650 mg Oral Q6H PRN Sean Morgan DO      aluminum-magnesium hydroxide-simethicone  30 mL Oral Q6H PRN Sean Morgan DO      ampicillin-sulbactam  3 g Intravenous Q6H Sean Morgan DO 3 g (01/19/24 0847)    carvedilol  25 mg Oral BID With Meals Sean Morgan DO      heparin (porcine)  5,000 Units Subcutaneous Q8H [facility] Sean Morgan DO      insulin lispro  1-6 Units Subcutaneous Q6H Jose Alfredo Noel PA-C      ketorolac  1 drop Both Eyes 4x Daily PRN Yesenia Turner PA-C      lidocaine  2 patch Topical Daily Sean Morgan DO      ondansetron  4 mg Intravenous Q6H PRN Sean Morgan DO      oxyCODONE  10 mg Oral Q4H PRN Yesenia Turner PA-C      oxyCODONE  5 mg Oral Q6H PRN Luke Wallace DO          Today, Patient Was Seen By: Jasmin Alvarez PA-C    **Please Note: This note may have been constructed using a voice recognition system.**

## 2024-01-19 NOTE — PLAN OF CARE
Problem: PAIN - ADULT  Goal: Verbalizes/displays adequate comfort level or baseline comfort level  Description: Interventions:  - Encourage patient to monitor pain and request assistance  - Assess pain using appropriate pain scale  - Administer analgesics based on type and severity of pain and evaluate response  - Implement non-pharmacological measures as appropriate and evaluate response  - Consider cultural and social influences on pain and pain management  - Notify physician/advanced practitioner if interventions unsuccessful or patient reports new pain  Outcome: Progressing     Problem: INFECTION - ADULT  Goal: Absence or prevention of progression during hospitalization  Description: INTERVENTIONS:  - Assess and monitor for signs and symptoms of infection  - Monitor lab/diagnostic results  - Monitor all insertion sites, i.e. indwelling lines, tubes, and drains  - Monitor endotracheal if appropriate and nasal secretions for changes in amount and color  - [place] appropriate cooling/warming therapies per order  - Administer medications as ordered  - Instruct and encourage patient and family to use good hand hygiene technique  - Identify and instruct in appropriate isolation precautions for identified infection/condition  Outcome: Progressing     Problem: SAFETY ADULT  Goal: Patient will remain free of falls  Description: INTERVENTIONS:  - Educate patient/family on patient safety including physical limitations  - Instruct patient to call for assistance with activity   - Consult OT/PT to assist with strengthening/mobility   - Keep Call bell within reach  - Keep bed low and locked with side rails adjusted as appropriate  - Keep care items and personal belongings within reach  - Initiate and maintain comfort rounds  - Make Fall Risk Sign visible to staff  - Offer Toileting every 2 Hours, in advance of need  - Initiate/Maintain bed alarm  - Obtain necessary fall risk management equipment: alarm   - Apply yellow  socks and bracelet for high fall risk patients  - Consider moving patient to room near nurses station  Outcome: Progressing  Goal: Maintain or return to baseline ADL function  Description: INTERVENTIONS:  -  Assess patient's ability to carry out ADLs; assess patient's baseline for ADL function and identify physical deficits which impact ability to perform ADLs (bathing, care of mouth/teeth, toileting, grooming, dressing, etc.)  - Assess/evaluate cause of self-care deficits   - Assess range of motion  - Assess patient's mobility; develop plan if impaired  - Assess patient's need for assistive devices and provide as appropriate  - Encourage maximum independence but intervene and supervise when necessary  - Involve family in performance of ADLs  - Assess for home care needs following discharge   - Consider OT consult to assist with ADL evaluation and planning for discharge  - Provide patient education as appropriate  Outcome: Progressing  Goal: Maintains/Returns to pre admission functional level  Description: INTERVENTIONS:  - Perform AM-PAC 6 Click Basic Mobility/ Daily Activity assessment daily.  - Set and communicate daily mobility goal to care team and patient/family/caregiver.   - Collaborate with rehabilitation services on mobility goals if consulted  - Perform Range of Motion 4 times a day.  - Reposition patient every 2 hours.  - Dangle patient 3 times a day  - Stand patient 3 times a day  - Ambulate patient 3 times a day  - Out of bed to chair 3 times a day   - Out of bed for meals 3 times a day  - Out of bed for toileting  - Record patient progress and toleration of activity level   Outcome: Progressing     Problem: DISCHARGE PLANNING  Goal: Discharge to home or other facility with appropriate resources  Description: INTERVENTIONS:  - Identify barriers to discharge w/patient and caregiver  - Arrange for needed discharge resources and transportation as appropriate  - Identify discharge learning needs (meds,  wound care, etc.)  - Arrange for interpretive services to assist at discharge as needed  - Refer to Case Management Department for coordinating discharge planning if the patient needs post-hospital services based on physician/advanced practitioner order or complex needs related to functional status, cognitive ability, or social support system  Outcome: Progressing     Problem: Knowledge Deficit  Goal: Patient/family/caregiver demonstrates understanding of disease process, treatment plan, medications, and discharge instructions  Description: Complete learning assessment and assess knowledge base.  Interventions:  - Provide teaching at level of understanding  - Provide teaching via preferred learning methods  Outcome: Progressing     Problem: SKIN/TISSUE INTEGRITY - ADULT  Goal: Skin Integrity remains intact(Skin Breakdown Prevention)  Description: Assess:  -Perform Malachi assessment every shift  -Clean and moisturize skin every shift   -Inspect skin when repositioning, toileting, and assisting with ADLS  -Assess under medical devices such as iv every shift   -Assess extremities for adequate circulation and sensation     Bed Management:  -Have minimal linens on bed & keep smooth, unwrinkled  -Change linens as needed when moist or perspiring  -Avoid sitting or lying in one position for more than 2 hours while in bed  -Keep HOB at 30 degrees     Toileting:  -Offer bedside commode  -Assess for incontinence every shift   -Use incontinent care products after each incontinent episode such as foam cleanser     Activity:  -Mobilize patient 3 times a day  -Encourage activity and walks on unit  -Encourage or provide ROM exercises   -Turn and reposition patient every 2 Hours  -Use appropriate equipment to lift or move patient in bed  -Instruct/ Assist with weight shifting every shift  when out of bed in chair  -Consider limitation of chair time 2 hour intervals    Skin Care:  -Avoid use of baby powder, tape, friction and  shearing, hot water or constrictive clothing  -Relieve pressure over bony prominences using pillows   -Do not massage red bony areas    Next Steps:  -Teach patient strategies to minimize risks such as wounds    -Consider consults to  interdisciplinary teams such as wouns care   Outcome: Progressing  Goal: Incision(s), wounds(s) or drain site(s) healing without S/S of infection  Description: INTERVENTIONS  - Assess and document dressing, incision, wound bed, drain sites and surrounding tissue  - Provide patient and family education  - Perform skin care/dressing changes every shift   Outcome: Progressing  Goal: Pressure injury heals and does not worsen  Description: Interventions:  - Implement low air loss mattress or specialty surface (Criteria met)  - Apply silicone foam dressing  - Instruct/assist with weight shifting every 60  minutes when in chair   - Limit chair time to 2 hour intervals  - Use special pressure reducing interventions such as pillows  when in chair   - Apply fecal or urinary incontinence containment device   - Perform passive or active ROM every shift   - Turn and reposition patient & offload bony prominences every 2 hours   - Utilize friction reducing device or surface for transfers   - Consider consults to  interdisciplinary teams such as wound care   - Use incontinent care products after each incontinent episode such as foam cleanser   - Consider nutrition services referral as needed  Outcome: Progressing

## 2024-01-19 NOTE — CASE MANAGEMENT
Case Management Discharge Planning Note    Patient name Yoni Zazuetap  Location [place] [address] /[place] 2 M* MRN [id number]  : 1965 Date 2024       Current Admission Date: 1/15/2024  Current Admission Diagnosis:Cellulitis of hand   Patient Active Problem List    Diagnosis Date Noted    Cellulitis of hand 01/15/2024    Cellulitis of right foot 2023    Nail wound of right foot 2023    Dupuytren's disease of finger with nodules without contracture 10/17/2023    Acquired absence of other left toe(s) (HCC) 2023    Incisional hernia without obstruction or gangrene 2023    Ventral hernia without obstruction or gangrene 06/15/2023    Arthritis 2022    Continuous opioid dependence (HCC) 10/25/2022    Dyslipidemia 2022    H/O diabetic foot ulcer 2022    Chronic kidney disease, stage 3 ([facility]) 2022    Erectile dysfunction 2021    Neuropathy 2021    Low back pain with sciatica 2021    Lumbar radiculopathy 2021    Cervical radiculopathy 2021    Class 2 severe obesity with serious comorbidity and body mass index (BMI) of 35.0 to 35.9 in adult  2021    Essential hypertension     Type 2 diabetes mellitus with chronic kidney disease, without long-term current use of insulin ([facility])       LOS (days): 4  Geometric Mean LOS (GMLOS) (days): 3.2  Days to GMLOS:-0.8     OBJECTIVE:  Risk of Unplanned Readmission Score: 19.41         Current admission status: Inpatient   Preferred Pharmacy:   [facility] - [address] [address]  Phone: [phone] Fax: [phone]    Primary Care Provider: Judd Ji MD    Primary Insurance: MEDICARE  Secondary Insurance: BLUE CROSS    DISCHARGE DETAILS:    Discharge planning discussed with:: Patient  Freedom of Choice: Yes  Comments - Freedom of Choice: Pt will discharge home and follow up with his providers    Discharge Destination Plan::  Home  Transport at Discharge : Automobile  Accompanied by: Family member    IMM Given (Date):: 01/19/24  IMM Given to:: Patient    Additional Comments: CM met w/ Pt to review dischrge plan as Pt is expected to discharge home tomorrow 1/20. SO taina transport Pt home. CM remains available and will follow through discharge.

## 2024-01-19 NOTE — PROGRESS NOTES
S: Feeling better today, states his pain is definitely improved.  He has been doing the soaks as directed. Denies fevers or chills.    O:  AVSS, afebrile  Left hand:  I&D site on the dorsal radial hand/thumb remains partially open.  Erythema significant improved from yesterday.  Improved swelling.   about the I&D site similar to yesterday.  No expressible or visible purulence today.  The palmar incision is mostly closed. Minimally tender today no expressible purulence or drainage noted.  No significant erythema palmarly.  Able to flex and extend the thumb with minimal discomfort.   able to make a loose fist, limited by some swelling, but with minimal discomfort  Sensation intact on the dorsal and palmar thumb to light touch  Warm well-perfused fingertips    [place]:  CT scan of the left upper extremity and hand was personally reviewed and interpreted.  Evidence of cellulitis but no evidence of abscess or drainable fluid collection.    A/P:  58-year-old male with a left hand infection following a dog bite to the dorsal radial hand/thumb and thenar eminence.  He underwent a bedside I&D on 1/16/2024 where purulence was evacuated both dorsally and palmarly.  The wounds were left open and he was started on 3 times daily soaks and continued on IV Unasyn for antibiotics.  Overall today he continues to improve on antibiotics.  His CT did not show any remaining fluid collections.  His erythema is improving dorsally and volarly.  At this point he is improved enough that I do not think he needs operative intervention.  No need to make him n.p.o. after midnight again.  Will continue to monitor his symptoms.    -Continue 3 times daily soaks and warm soapy water, encouraged the patient to move his fingers as much as tolerated while doing the soaks and to use his contralateral hand to express any remaining purulence.  - Continue IV Unasyn, will follow wound cx  -Recommend transition to p.o. Augmentin on discharge x 2  weeks.  -I will see him in the office next week for wound check  -Can discharge per primary's discretion.  -Ortho hand will sign off at this time.  Please reconsult with further concerns.    Carroll Mccarthy MD

## 2024-01-19 NOTE — ASSESSMENT & PLAN NOTE
Lab Results   Component Value Date    HGBA1C 6.7 (H) 11/28/2023     Recent Labs     01/18/24  1221 01/18/24  1606 01/19/24  0013 01/19/24  0556   POCGLU 174* 119 198* 146*         Blood Sugar Average: Last 72 hrs:  (P) [id number]    SSI w/ meals & qhs   Home Jardiance on hold

## 2024-01-19 NOTE — PLAN OF CARE
Problem: PAIN - ADULT  Goal: Verbalizes/displays adequate comfort level or baseline comfort level  Description: Interventions:  - Encourage patient to monitor pain and request assistance  - Assess pain using appropriate pain scale  - Administer analgesics based on type and severity of pain and evaluate response  - Implement non-pharmacological measures as appropriate and evaluate response  - Consider cultural and social influences on pain and pain management  - Notify physician/advanced practitioner if interventions unsuccessful or patient reports new pain  Outcome: Progressing     Problem: INFECTION - ADULT  Goal: Absence or prevention of progression during hospitalization  Description: INTERVENTIONS:  - Assess and monitor for signs and symptoms of infection  - Monitor lab/diagnostic results  - Monitor all insertion sites, i.e. indwelling lines, tubes, and drains  - Monitor endotracheal if appropriate and nasal secretions for changes in amount and color  - [place] appropriate cooling/warming therapies per order  - Administer medications as ordered  - Instruct and encourage patient and family to use good hand hygiene technique  - Identify and instruct in appropriate isolation precautions for identified infection/condition  Outcome: Progressing     Problem: SAFETY ADULT  Goal: Patient will remain free of falls  Description: INTERVENTIONS:  - Educate patient/family on patient safety including physical limitations  - Instruct patient to call for assistance with activity   - Consult OT/PT to assist with strengthening/mobility   - Keep Call bell within reach  - Keep bed low and locked with side rails adjusted as appropriate  - Keep care items and personal belongings within reach  - Initiate and maintain comfort rounds  - Make Fall Risk Sign visible to staff  - Offer Toileting  - Initiate/Maintain alarm  - Obtain necessary fall risk management equipment  - Apply yellow socks and bracelet for high fall risk patients  -  Consider moving patient to room near nurses station  Outcome: Progressing  Goal: Maintain or return to baseline ADL function  Description: INTERVENTIONS:  -  Assess patient's ability to carry out ADLs; assess patient's baseline for ADL function and identify physical deficits which impact ability to perform ADLs (bathing, care of mouth/teeth, toileting, grooming, dressing, etc.)  - Assess/evaluate cause of self-care deficits   - Assess range of motion  - Assess patient's mobility; develop plan if impaired  - Assess patient's need for assistive devices and provide as appropriate  - Encourage maximum independence but intervene and supervise when necessary  - Involve family in performance of ADLs  - Assess for home care needs following discharge   - Consider OT consult to assist with ADL evaluation and planning for discharge  - Provide patient education as appropriate  Outcome: Progressing  Goal: Maintains/Returns to pre admission functional level  Description: INTERVENTIONS:  - Perform AM-PAC 6 Click Basic Mobility/ Daily Activity assessment daily.  - Set and communicate daily mobility goal to care team and patient/family/caregiver.   - Collaborate with rehabilitation services on mobility goals if consulted  - Perform Range of Motion   - Reposition patient  - Dangle patient   - Stand patient   - Ambulate patient   - Out of bed to chair   - Out of bed for meals   - Out of bed for toileting  - Record patient progress and toleration of activity level   Outcome: Progressing     Problem: DISCHARGE PLANNING  Goal: Discharge to home or other facility with appropriate resources  Description: INTERVENTIONS:  - Identify barriers to discharge w/patient and caregiver  - Arrange for needed discharge resources and transportation as appropriate  - Identify discharge learning needs (meds, wound care, etc.)  - Arrange for interpretive services to assist at discharge as needed  - Refer to Case Management Department for coordinating  discharge planning if the patient needs post-hospital services based on physician/advanced practitioner order or complex needs related to functional status, cognitive ability, or social support system  Outcome: Progressing     Problem: Knowledge Deficit  Goal: Patient/family/caregiver demonstrates understanding of disease process, treatment plan, medications, and discharge instructions  Description: Complete learning assessment and assess knowledge base.  Interventions:  - Provide teaching at level of understanding  - Provide teaching via preferred learning methods  Outcome: Progressing

## 2024-01-20 VITALS
DIASTOLIC BLOOD PRESSURE: 87 MMHG | WEIGHT: 270.5 LBS | SYSTOLIC BLOOD PRESSURE: 141 MMHG | OXYGEN SATURATION: 96 % | BODY MASS INDEX: 36.64 KG/M2 | HEART RATE: 73 BPM | HEIGHT: 72 IN | TEMPERATURE: 98.1 F | RESPIRATION RATE: 18 BRPM

## 2024-01-20 LAB
ANION GAP SERPL CALCULATED.3IONS-SCNC: 6 MMOL/L
BASOPHILS # BLD AUTO: 0.03 THOUSANDS/ÂΜL (ref 0–0.1)
BASOPHILS NFR BLD AUTO: 1 % (ref 0–1)
BUN SERPL-MCNC: 36 MG/DL (ref 5–25)
CALCIUM SERPL-MCNC: 8.8 MG/DL (ref 8.4–10.2)
CHLORIDE SERPL-SCNC: 105 MMOL/L (ref 96–108)
CO2 SERPL-SCNC: 24 MMOL/L (ref 21–32)
CREAT SERPL-MCNC: 1.43 MG/DL (ref 0.6–1.3)
EOSINOPHIL # BLD AUTO: 0.19 THOUSAND/ÂΜL (ref 0–0.61)
EOSINOPHIL NFR BLD AUTO: 4 % (ref 0–6)
ERYTHROCYTE [DISTWIDTH] IN BLOOD BY AUTOMATED COUNT: 13.1 % (ref 11.6–15.1)
GFR SERPL CREATININE-BSD FRML MDRD: 52 ML/MIN/1.73SQ M
GLUCOSE SERPL-MCNC: 121 MG/DL (ref 65–140)
GLUCOSE SERPL-MCNC: 124 MG/DL (ref 65–140)
GLUCOSE SERPL-MCNC: 136 MG/DL (ref 65–140)
GLUCOSE SERPL-MCNC: 159 MG/DL (ref 65–140)
HCT VFR BLD AUTO: 34.3 % (ref 36.5–49.3)
HGB BLD-MCNC: 11.5 G/DL (ref 12–17)
IMM GRANULOCYTES # BLD AUTO: 0.01 THOUSAND/UL (ref 0–0.2)
IMM GRANULOCYTES NFR BLD AUTO: 0 % (ref 0–2)
LYMPHOCYTES # BLD AUTO: 1.25 THOUSANDS/ÂΜL (ref 0.6–4.47)
LYMPHOCYTES NFR BLD AUTO: 27 % (ref 14–44)
MCH RBC QN AUTO: 30.6 PG (ref 26.8–34.3)
MCHC RBC AUTO-ENTMCNC: 33.5 G/DL (ref 31.4–37.4)
MCV RBC AUTO: 91 FL (ref 82–98)
MONOCYTES # BLD AUTO: 0.44 THOUSAND/ÂΜL (ref 0.17–1.22)
MONOCYTES NFR BLD AUTO: 10 % (ref 4–12)
NEUTROPHILS # BLD AUTO: 2.65 THOUSANDS/ÂΜL (ref 1.85–7.62)
NEUTS SEG NFR BLD AUTO: 58 % (ref 43–75)
NRBC BLD AUTO-RTO: 0 /100 WBCS
PLATELET # BLD AUTO: 215 THOUSANDS/UL (ref 149–390)
PMV BLD AUTO: 9.9 FL (ref 8.9–12.7)
POTASSIUM SERPL-SCNC: 4.1 MMOL/L (ref 3.5–5.3)
RBC # BLD AUTO: 3.76 MILLION/UL (ref 3.88–5.62)
SODIUM SERPL-SCNC: 135 MMOL/L (ref 135–147)
WBC # BLD AUTO: 4.57 THOUSAND/UL (ref 4.31–10.16)

## 2024-01-20 PROCEDURE — 99239 HOSP IP/OBS DSCHRG MGMT >30: CPT | Performed by: INTERNAL MEDICINE

## 2024-01-20 PROCEDURE — 85025 COMPLETE CBC W/AUTO DIFF WBC: CPT

## 2024-01-20 PROCEDURE — 80048 BASIC METABOLIC PNL TOTAL CA: CPT

## 2024-01-20 PROCEDURE — 82948 REAGENT STRIP/BLOOD GLUCOSE: CPT

## 2024-01-20 RX ORDER — AMOXICILLIN AND CLAVULANATE POTASSIUM 875; 125 MG/1; MG/1
1 TABLET, FILM COATED ORAL EVERY 12 HOURS SCHEDULED
Qty: 28 TABLET | Refills: 0 | Status: SHIPPED | OUTPATIENT
Start: 2024-01-20 | End: 2024-02-01 | Stop reason: SDUPTHER

## 2024-01-20 RX ORDER — OXYCODONE HYDROCHLORIDE 5 MG/1
5 TABLET ORAL EVERY 4 HOURS PRN
Qty: 20 TABLET | Refills: 0 | Status: SHIPPED | OUTPATIENT
Start: 2024-01-20 | End: 2024-01-30

## 2024-01-20 RX ADMIN — LIDOCAINE 5% 1 PATCH: 700 PATCH TOPICAL at 08:15

## 2024-01-20 RX ADMIN — Medication 250 MG: at 08:10

## 2024-01-20 RX ADMIN — SODIUM CHLORIDE 3 G: 9 INJECTION, SOLUTION INTRAVENOUS at 02:43

## 2024-01-20 RX ADMIN — CARVEDILOL 25 MG: 25 TABLET, FILM COATED ORAL at 08:10

## 2024-01-20 RX ADMIN — OXYCODONE HYDROCHLORIDE 10 MG: 10 TABLET ORAL at 00:55

## 2024-01-20 RX ADMIN — KETOROLAC TROMETHAMINE 1 DROP: 5 SOLUTION OPHTHALMIC at 10:38

## 2024-01-20 RX ADMIN — SODIUM CHLORIDE 3 G: 9 INJECTION, SOLUTION INTRAVENOUS at 08:10

## 2024-01-20 RX ADMIN — SODIUM CHLORIDE 3 G: 9 INJECTION, SOLUTION INTRAVENOUS at 14:00

## 2024-01-20 NOTE — ASSESSMENT & PLAN NOTE
Held home lisinopril and hydrochlorothiazide due to elevated creatinine   Continue coreg  Restart medications on discharge as creatinine of 1.43 appears to be patient's baseline

## 2024-01-20 NOTE — ASSESSMENT & PLAN NOTE
Presented w/ left hand cellulitis, swelling, & drainage   Sustained a bite by his hutn retriever on Saturday. Wound worsened and developed increased swelling and erythema and pain  On admissions, hand surgery recommended to open puncture wounds at the bedside which was performed by emergency medicine.   Repeat Ct upper extremity showing soft tissue laceration and subcutaneous edema with skin thickening along the dorsal thenar compartment of the hand likely cellulitis. No well-defined abscess or soft tissue gas.  Under went I&D 1/16/24  Wound culture taken with no bacterial growth   Continue IV Unasyn day 5, transition to oral augmentin at discharge    Continue 3 times daily soaks with warm soapy water   Hand surgery following cleared for discharge will follow up next week

## 2024-01-20 NOTE — ASSESSMENT & PLAN NOTE
Lab Results   Component Value Date    EGFR 52 01/20/2024    EGFR 52 01/19/2024    EGFR 55 01/18/2024    CREATININE 1.43 (H) 01/20/2024    CREATININE 1.43 (H) 01/19/2024    CREATININE 1.37 (H) 01/18/2024     Elevation in kidney function meeting VIVIANA criteria   Creatinine is still within outpatient baseline, fluctuates outpatient    Held home lisinopril and hydrocholorthiazide   Encourage po intake

## 2024-01-20 NOTE — PLAN OF CARE
Problem: PAIN - ADULT  Goal: Verbalizes/displays adequate comfort level or baseline comfort level  Description: Interventions:  - Encourage patient to monitor pain and request assistance  - Assess pain using appropriate pain scale  - Administer analgesics based on type and severity of pain and evaluate response  - Implement non-pharmacological measures as appropriate and evaluate response  - Consider cultural and social influences on pain and pain management  - Notify physician/advanced practitioner if interventions unsuccessful or patient reports new pain  Outcome: Progressing     Problem: INFECTION - ADULT  Goal: Absence or prevention of progression during hospitalization  Description: INTERVENTIONS:  - Assess and monitor for signs and symptoms of infection  - Monitor lab/diagnostic results  - Monitor all insertion sites, i.e. indwelling lines, tubes, and drains  - Monitor endotracheal if appropriate and nasal secretions for changes in amount and color  - [place] appropriate cooling/warming therapies per order  - Administer medications as ordered  - Instruct and encourage patient and family to use good hand hygiene technique  - Identify and instruct in appropriate isolation precautions for identified infection/condition  Outcome: Progressing     Problem: SAFETY ADULT  Goal: Patient will remain free of falls  Description: INTERVENTIONS:  - Educate patient/family on patient safety including physical limitations  - Instruct patient to call for assistance with activity   - Consult OT/PT to assist with strengthening/mobility   - Keep Call bell within reach  - Keep bed low and locked with side rails adjusted as appropriate  - Keep care items and personal belongings within reach  - Initiate and maintain comfort rounds  - Make Fall Risk Sign visible to staff  - Offer Toileting every 2 Hours, in advance of need  - Initiate/Maintain bed alarm  - Obtain necessary fall risk management equipment:   - Apply yellow socks and  bracelet for high fall risk patients  - Consider moving patient to room near nurses station  Outcome: Progressing  Goal: Maintain or return to baseline ADL function  Description: INTERVENTIONS:  -  Assess patient's ability to carry out ADLs; assess patient's baseline for ADL function and identify physical deficits which impact ability to perform ADLs (bathing, care of mouth/teeth, toileting, grooming, dressing, etc.)  - Assess/evaluate cause of self-care deficits   - Assess range of motion  - Assess patient's mobility; develop plan if impaired  - Assess patient's need for assistive devices and provide as appropriate  - Encourage maximum independence but intervene and supervise when necessary  - Involve family in performance of ADLs  - Assess for home care needs following discharge   - Consider OT consult to assist with ADL evaluation and planning for discharge  - Provide patient education as appropriate  Outcome: Progressing  Goal: Maintains/Returns to pre admission functional level  Description: INTERVENTIONS:  - Perform AM-PAC 6 Click Basic Mobility/ Daily Activity assessment daily.  - Set and communicate daily mobility goal to care team and patient/family/caregiver.   - Collaborate with rehabilitation services on mobility goals if consulted  - Perform Range of Motion 3 times a day.  - Reposition patient every 2 hours.  - Dangle patient 3 times a day  - Stand patient 3 times a day  - Ambulate patient 3 times a day  - Out of bed to chair 3 times a day   - Out of bed for meals 3 times a day  - Out of bed for toileting  - Record patient progress and toleration of activity level   Outcome: Progressing     Problem: DISCHARGE PLANNING  Goal: Discharge to home or other facility with appropriate resources  Description: INTERVENTIONS:  - Identify barriers to discharge w/patient and caregiver  - Arrange for needed discharge resources and transportation as appropriate  - Identify discharge learning needs (meds, wound care,  etc.)  - Arrange for interpretive services to assist at discharge as needed  - Refer to Case Management Department for coordinating discharge planning if the patient needs post-hospital services based on physician/advanced practitioner order or complex needs related to functional status, cognitive ability, or social support system  Outcome: Progressing     Problem: SKIN/TISSUE INTEGRITY - ADULT  Goal: Skin Integrity remains intact(Skin Breakdown Prevention)  Description: Assess:  -Perform Malachi assessment every shift  -Clean and moisturize skin every shift and prn  -Inspect skin when repositioning, toileting, and assisting with ADLS  -Assess extremities for adequate circulation and sensation     Bed Management:  -Have minimal linens on bed & keep smooth, unwrinkled  -Change linens as needed when moist or perspiring  -Avoid sitting or lying in one position for more than 2 hours while in bed      Outcome: Progressing  Goal: Incision(s), wounds(s) or drain site(s) healing without S/S of infection  Description: INTERVENTIONS  - Assess and document dressing, incision, wound bed, drain sites and surrounding tissue  - Provide patient and family education  - Perform skin care/dressing changes every shift   Outcome: Progressing

## 2024-01-20 NOTE — ASSESSMENT & PLAN NOTE
Lab Results   Component Value Date    HGBA1C 6.7 (H) 11/28/2023     Recent Labs     01/19/24  1827 01/20/24  0003 01/20/24  0532 01/20/24  1352   POCGLU 131 136 121 159*         Blood Sugar Average: Last 72 hrs:  (P) [id number]    SSI w/ meals & qhs   Home Jardiance on hold

## 2024-01-20 NOTE — DISCHARGE SUMMARY
"[facility]  Discharge- Yoni VALENCIA Jonathan 1965, 58 y.o. male MRN: [id number]  Unit/Bed#: [place] [address] -02 Encounter: [id number]  Primary Care Provider: Judd Ji MD   Date and time admitted to hospital: 1/15/2024  1:28 PM    Continuous opioid dependence (HCC)  Assessment & Plan  Utilizes tramadol  PDMP meeting attending; \"no red flags\"    Chronic kidney disease, stage 3 (HCC)  Assessment & Plan  Lab Results   Component Value Date    EGFR 52 01/20/2024    EGFR 52 01/19/2024    EGFR 55 01/18/2024    CREATININE 1.43 (H) 01/20/2024    CREATININE 1.43 (H) 01/19/2024    CREATININE 1.37 (H) 01/18/2024     Elevation in kidney function meeting VIVIANA criteria   Creatinine is still within outpatient baseline, fluctuates outpatient    Held home lisinopril and hydrocholorthiazide   Encourage po intake     Lumbar radiculopathy  Assessment & Plan  Chronically maintained on tramadol, on hold   As needed analgesia while inpatient    Type 2 diabetes mellitus with chronic kidney disease, without long-term current use of insulin ([facility])  Assessment & Plan  Lab Results   Component Value Date    HGBA1C 6.7 (H) 11/28/2023     Recent Labs     01/19/24  1827 01/20/24  0003 01/20/24  0532 01/20/24  1352   POCGLU 131 136 121 159*         Blood Sugar Average: Last 72 hrs:  (P) 148.8916109455926669    SSI w/ meals & qhs   Home Jardiance on hold     Essential hypertension  Assessment & Plan  Held home lisinopril and hydrochlorothiazide due to elevated creatinine   Continue coreg  Restart medications on discharge as creatinine of 1.43 appears to be patient's baseline    * Cellulitis of hand  Assessment & Plan  Presented w/ left hand cellulitis, swelling, & drainage   Sustained a bite by his hunt retriever on Saturday. Wound worsened and developed increased swelling and erythema and pain  On admissions, hand surgery recommended to open puncture wounds at the bedside which was performed by emergency medicine. " "  Repeat Ct upper extremity showing soft tissue laceration and subcutaneous edema with skin thickening along the dorsal thenar compartment of the hand likely cellulitis. No well-defined abscess or soft tissue gas.  Under went I&D 1/16/24  Wound culture taken with no bacterial growth   Continue IV Unasyn day 5, transition to oral augmentin at discharge    Continue 3 times daily soaks with warm soapy water   Hand surgery following cleared for discharge will follow up next week                Medical Problems       Resolved Problems  Date Reviewed: 1/19/2024   None         Admission Date:   Admission Orders (From admission, onward)       Ordered        01/15/24 1439  INPATIENT ADMISSION  Once                            Admitting Diagnosis: Cellulitis [L03.90]  Type 2 diabetes mellitus with chronic kidney disease, without long-term current use of insulin (HCC) [E11.22]    HPI: \"Yoni Castillo is a 58 y.o. male With past medical history of hypertension, non-insulin-dependent type 2 diabetes, ckd, chronic pain who presents with left hand erythema and swelling following a dog bite 2 days ago.  Per patient his dog is up to date on vaccinations.  Most recent tetanus in 2022.  He did feel some fevers and chills but no recorded fevers at home.\"    Procedures Performed:   Orders Placed This Encounter   Procedures    Incision and Drainage       Summary of Hospital Course: Patient presents with worsening left hand swelling and erythema status post dog bite.  Hand surgery consulted and patient underwent I&D of the hand.  Received IV Unasyn and will be discharged on Augmentin for 2 weeks as per hand surgery recommendations.  Given Roxicodone for pain control considering incision to hand; can resume normal tramadol once hand pain has resolved    Significant Findings, Care, Treatment and Services Provided: Left hand cellulitis    Complications: None    Condition at Discharge: stable         Discharge instructions/Information to " patient and family:   See after visit summary for information provided to patient and family.      Provisions for Follow-Up Care:  See after visit summary for information related to follow-up care and any pertinent home health orders.      PCP: Judd Ji MD    Disposition: Home    Planned Readmission: No    Discharge Statement   I spent 45 minutes discharging the patient. This time was spent on the day of discharge. I had direct contact with the patient on the day of discharge. Additional documentation is required if more than 30 minutes were spent on discharge.     Discharge Medications:  See after visit summary for reconciled discharge medications provided to patient and family.

## 2024-01-20 NOTE — DISCHARGE INSTR - AVS FIRST PAGE
Please remember to take all medications as directed on your medication list and follow-up with your primary care provider in 1-2 weeks.    You are encouraged to keep your med list on your person (in your wallet or purse) and please take your medication list provided in this After Visit Summary to your PCP visit following discharge.    Please ask your nurse to show you the medication list and explain when to take your medications.    Additionally, we encourage all patient's to take their actual medications with them to their primary care visit! This is to verify you have the proper medications and the proper dosages.  Remember, while medications are often listed in your computer record; that may not always be right as mistakes can occur at the pharmacy or in the computer and sometimes old medication bottles can be mistaken for newer medications.    (Note to nursing: please place patient's medication list on top of the AVS.)  _________________________________________________________________________________________________    Please be sure to keep your wound clean sdand dressed.  Follow-up with orthopedic hand surgery next week.    Take antibiotics as directed.

## 2024-01-21 LAB
BACTERIA WND AEROBE CULT: NO GROWTH
GRAM STN SPEC: NORMAL

## 2024-01-22 ENCOUNTER — TRANSITIONAL CARE MANAGEMENT (OUTPATIENT)
Dept: INTERNAL MEDICINE CLINIC | Facility: CLINIC | Age: 59
End: 2024-01-22

## 2024-01-22 ENCOUNTER — TELEPHONE (OUTPATIENT)
Age: 59
End: 2024-01-22

## 2024-01-22 NOTE — TELEPHONE ENCOUNTER
Hello,  Please advise if the following patient can be forced onto the schedule:    Patient: Ed Jonathan    : 1965    MRN: [id number]    Call back #: [phone]    Insurance: Medicare/[facility]    Reason for appointment: follow up from hospital/ has nothing until late Feb./was at hospital for dog bite on hand at time of 24 appt. Please speak to Dr. Fairchild and call back and schedule.     Requested doctor/location: Dr. Fairchild/[facility] [place] office      Thank you.

## 2024-01-22 NOTE — TELEPHONE ENCOUNTER
please advice if patient needs a forced appt. I do not see you treating him back in December for this, I did note a resident saw him and pt was to follow up in 1 week.

## 2024-01-22 NOTE — TELEPHONE ENCOUNTER
He needs to be referred to orthopedics to follow up this was a dog bite to the hand, podiatry cannot treat this.

## 2024-01-23 DIAGNOSIS — M79.605 LEFT LEG PAIN: ICD-10-CM

## 2024-01-23 RX ORDER — TRAMADOL HYDROCHLORIDE 50 MG/1
TABLET ORAL
Qty: 60 TABLET | Refills: 0 | Status: SHIPPED | OUTPATIENT
Start: 2024-01-23

## 2024-01-26 ENCOUNTER — OFFICE VISIT (OUTPATIENT)
Dept: OBGYN CLINIC | Facility: CLINIC | Age: 59
End: 2024-01-26

## 2024-01-26 ENCOUNTER — OFFICE VISIT (OUTPATIENT)
Dept: INTERNAL MEDICINE CLINIC | Facility: CLINIC | Age: 59
End: 2024-01-26
Payer: MEDICARE

## 2024-01-26 VITALS
HEIGHT: 72 IN | BODY MASS INDEX: 36.7 KG/M2 | RESPIRATION RATE: 14 BRPM | HEART RATE: 76 BPM | DIASTOLIC BLOOD PRESSURE: 76 MMHG | SYSTOLIC BLOOD PRESSURE: 126 MMHG | TEMPERATURE: 97 F | WEIGHT: 271 LBS | OXYGEN SATURATION: 95 %

## 2024-01-26 VITALS — BODY MASS INDEX: 36.7 KG/M2 | HEIGHT: 72 IN | WEIGHT: 271 LBS

## 2024-01-26 DIAGNOSIS — N28.9 RENAL INSUFFICIENCY: ICD-10-CM

## 2024-01-26 DIAGNOSIS — I10 ESSENTIAL HYPERTENSION: ICD-10-CM

## 2024-01-26 DIAGNOSIS — N18.32 STAGE 3B CHRONIC KIDNEY DISEASE (HCC): ICD-10-CM

## 2024-01-26 DIAGNOSIS — L08.9 ANIMAL BITE OF LEFT HAND WITH INFECTION, SUBSEQUENT ENCOUNTER: Primary | ICD-10-CM

## 2024-01-26 DIAGNOSIS — S61.452D ANIMAL BITE OF LEFT HAND WITH INFECTION, SUBSEQUENT ENCOUNTER: Primary | ICD-10-CM

## 2024-01-26 DIAGNOSIS — M54.40 LOW BACK PAIN WITH SCIATICA, SCIATICA LATERALITY UNSPECIFIED, UNSPECIFIED BACK PAIN LATERALITY, UNSPECIFIED CHRONICITY: ICD-10-CM

## 2024-01-26 DIAGNOSIS — L03.119 CELLULITIS OF HAND: Primary | ICD-10-CM

## 2024-01-26 PROBLEM — S91.331A NAIL WOUND OF RIGHT FOOT: Status: RESOLVED | Noted: 2023-11-28 | Resolved: 2024-01-26

## 2024-01-26 PROCEDURE — 99495 TRANSJ CARE MGMT MOD F2F 14D: CPT | Performed by: INTERNAL MEDICINE

## 2024-01-26 PROCEDURE — 99024 POSTOP FOLLOW-UP VISIT: CPT | Performed by: ORTHOPAEDIC SURGERY

## 2024-01-26 RX ORDER — LIDOCAINE 50 MG/G
1 PATCH TOPICAL DAILY
Qty: 30 PATCH | Refills: 4 | Status: SHIPPED | OUTPATIENT
Start: 2024-01-26

## 2024-01-26 NOTE — PROGRESS NOTES
HAND & UPPER EXTREMITY OFFICE VISIT   Referred By:  No referring provider defined for this encounter.      Chief Complaint:     Left hand pain    Procedure:  1/16/2024: Bedside left hand I&D for infection status post dog bite, thenar eminence and dorsal thumb metacarpal    History of Present Illness:   Patient presents now 10 days status post the above procedure.  He has since been discharged from the hospital.  He states he is doing really well.  His pain and swelling have improved as well as his hand range of motion.  He continues to do soaks and take the oral antibiotics as prescribed.  He still has some tenderness at the wound sites but denies fevers or chills.    Past Medical History:  Past Medical History:   Diagnosis Date    Chronic kidney disease 2012    Chronic pain disorder     Diabetes mellitus (HCC)     H/O eye surgery     High blood sugar     Hypertension     Liver disease      Past Surgical History:   Procedure Laterality Date    HERNIA REPAIR      INCISION AND DRAINAGE  01/16/2024    KIDNEY SURGERY      KNEE SURGERY  1980    MOUTH SURGERY      NE AMPUTATION METATARSAL W/TOE SINGLE Left 6/1/2022    Procedure: RAY RESECTION FOOT;  Surgeon: Hannah Melgoza DPM;  Location: [facility];  Service: Podiatry    NE RPR AA HERNIA 1ST < 3 CM REDUCIBLE N/A 7/14/2023    Procedure: REPAIR HERNIA INCISIONAL;  Surgeon: Matt Melo MD;  [facility];  Service: General    WOUND DEBRIDEMENT Left 4/28/2022    Procedure: Left foot washout;  Surgeon: Hannah Melgoza DPM;  Location: [facility];  Service: Podiatry    WOUND DEBRIDEMENT Left 6/6/2022    Procedure: DEBRIDEMENT WOUND (WASH OUT);  Surgeon: Hannah Melgoza DPM;  Location: [facility];  Service: Podiatry     Family History   Problem Relation Age of Onset    Diabetes Paternal Grandfather     Diabetes Paternal Grandmother      Social History     Socioeconomic History    Marital status: Registered Domestic Partner     Spouse name: Not on file    Number  of children: Not on file    Years of education: Not on file    Highest education level: Not on file   Occupational History    Not on file   Tobacco Use    Smoking status: Never    Smokeless tobacco: Never   Vaping Use    Vaping status: Never Used   Substance and Sexual Activity    Alcohol use: Yes     Alcohol/week: 1.0 standard drink of alcohol     Types: 1 Cans of beer per week     Comment: ocassional    Drug use: Never    Sexual activity: Not Currently     Partners: Female     Birth control/protection: Abstinence   Other Topics Concern    Not on file   Social History Narrative    Not on file     Social Determinants of Health     Financial Resource Strain: Not on file   Food Insecurity: Food Insecurity Present (1/17/2024)    Hunger Vital Sign     Worried About Running Out of Food in the Last Year: Sometimes true     Ran Out of Food in the Last Year: Sometimes true   Transportation Needs: No Transportation Needs (1/17/2024)    PRAPARE - Transportation     Lack of Transportation (Medical): No     Lack of Transportation (Non-Medical): No   Physical Activity: Not on file   Stress: Not on file   Social Connections: Not on file   Intimate Partner Violence: Not on file   Housing Stability: Low Risk  (1/17/2024)    Housing Stability Vital Sign     Unable to Pay for Housing in the Last Year: No     Number of Places Lived in the Last Year: 1     Unstable Housing in the Last Year: No     Scheduled Meds:  Continuous Infusions:No current facility-administered medications for this visit.    PRN Meds:.  Allergies   Allergen Reactions    Cephalexin Hives     Skin peeling  Tolerates penicillins (tolerated unasyn and zosyn)    Metformin GI Intolerance       Physical Examination:    Ht 6' (1.829 m)   Wt 123 kg (271 lb)   BMI 36.75 kg/m²     Gen: A&Ox3, NAD          Left Upper Extremity:  Dressing clean and dry, removed  Dorsal hand I&D site healing well.  It is healing by secondary intention and reports continued drainage.  No  expressible fluid or palpable fluctuance today.  No drainage noted.  Minimal erythema surrounding the I&D site but no erythema throughout the remainder of the hand.  Thenar I&D site is closed with some scar tissue underneath the skin and minimal tenderness.  No palpable fluctuance.  Full range of motion of the fingers and thumb.  Composite fist without discomfort.  Significant improvement in his swelling compared to last evaluation.  Sensation intact to light touch in the axillary median, ulnar, and radial nerve distributions  2+RP      Studies:  No imaging to review    Wound culture from the dorsal hand wound is no growth to date    Assessment and Plan:  1. Animal bite of left hand with infection, subsequent encounter            58 y.o. male presents 10 days status post bedside I&D of the left hand/thumb and thenar eminence for infection following a dog bite in the setting of uncontrolled diabetes.   He states that his blood glucose levels have been fairly well-controlled since discharge from the hospital.  He is working hard on maintaining this.  He has been taking his oral antibiotics as prescribed.  He is hoping that he may get a longer prescription and he worries about recurrence of infection once he completes his antibiotics the next week.    Overall he is doing really well and his swelling pain and range of motion have significantly improved.  I think the wound will continue to heal.  I recommend continue to do soaks and dressing changes until the wound closes completely dorsally.  I will see him in 1 week for a wound check.    he expressed understanding of the plan and agreed. We encouraged them to contact our office with any questions or concerns.         Carroll Mccarthy MD  Hand and Upper Extremity Surgery          *This note was dictated using Dragon voice recognition software. Please excuse any word substitutions or errors.*

## 2024-01-26 NOTE — ASSESSMENT & PLAN NOTE
Lab Results   Component Value Date    EGFR 52 01/20/2024    EGFR 52 01/19/2024    EGFR 55 01/18/2024    CREATININE 1.43 (H) 01/20/2024    CREATININE 1.43 (H) 01/19/2024    CREATININE 1.37 (H) 01/18/2024   Chronic stage III kidney disease reviewed blood work from hospitalization recommend continuation of aggressive hydration especially while on antibiotic therapy.

## 2024-01-26 NOTE — PROGRESS NOTES
Assessment & Plan     1. Renal insufficiency  -     Albumin / creatinine urine ratio; Future; Expected date: 01/26/2024  -     Basic metabolic panel; Future; Expected date: 01/26/2024    2. Low back pain with sciatica, sciatica laterality unspecified, unspecified back pain laterality, unspecified chronicity  -     lidocaine (Lidoderm) 5 %; Apply 1 patch topically over 12 hours daily Remove & Discard patch within 12 hours or as directed by MD    3. Essential hypertension  Assessment & Plan:  Blood pressure assessment confirms good control recommend continuation of current hypertensive therapy      4. Cellulitis of hand  Assessment & Plan:  Current assessment is cellulitis of the left hand indicates that gradual improvement since initial dog bite.  Patient continues on Augmentin 875-125 every 12 hours for period of 2 weeks from discharge.  No systemic symptoms of fever or chills.  Wound care reviewed with the patient no side effects of antibiotic therapy at this time follow-up in 1 week.      5. Stage 3b chronic kidney disease (HCC)  Assessment & Plan:  Lab Results   Component Value Date    EGFR 52 01/20/2024    EGFR 52 01/19/2024    EGFR 55 01/18/2024    CREATININE 1.43 (H) 01/20/2024    CREATININE 1.43 (H) 01/19/2024    CREATININE 1.37 (H) 01/18/2024   Chronic stage III kidney disease reviewed blood work from hospitalization recommend continuation of aggressive hydration especially while on antibiotic therapy.           Subjective     Transitional Care Management Review:   Yoni Castillo is a 58 y.o. male here for TCM follow up.     During the TCM phone call patient stated:  TCM Call       Date and time call was made  1/22/2024  9:14 AM    Hospital care reviewed  Records reviewed    Patient was hospitialized at  [facility]    Date of Admission  01/15/24    Date of discharge  01/20/24    Diagnosis  cellulitis    Disposition  Home          TCM Call       Scheduled for follow up?  Yes  [id number]    I have  advised the patient to call PCP with any new or worsening symptoms  Drake Vasquez MA          This is a transition of care visit for this 58-year-old gentleman recently hospitalized at [facility] for a cellulitis of his left hand.  He suffered a dog bite by his dog at home.  He was admitted due to purulent drainage from the hand incision and drainage of the wound was carried out during hospitalization.  He was treated with intravenous antibiotics and switch to oral therapy.  Returns to our office today for evaluation of his infection.  Indicates pain has gradually decreased in the hand denies any sweats or chills since discharge.  He continues on Augmentin 875-1 25 every 12 hours for 14 days.      Review of Systems   Skin:  Positive for wound.        Wound of the left hand seems to be draining a small amount of purulent liquid no increase in inflammation around the wound according to the patient.  Minimal tenderness at the wound site   All other systems reviewed and are negative.      Objective     /76   Pulse 76   Temp (!) 97 °F (36.1 °C)   Resp 14   Ht 6' (1.829 m)   Wt 123 kg (271 lb)   SpO2 95%   BMI 36.75 kg/m²      Physical Exam  Vitals and nursing note reviewed.   Constitutional:       General: He is not in acute distress.     Appearance: He is well-developed.   HENT:      Head: Normocephalic and atraumatic.   Eyes:      Conjunctiva/sclera: Conjunctivae normal.   Cardiovascular:      Rate and Rhythm: Normal rate and regular rhythm.      Heart sounds: No murmur heard.  Pulmonary:      Effort: Pulmonary effort is normal. No respiratory distress.      Breath sounds: Normal breath sounds. No wheezing.   Abdominal:      Palpations: Abdomen is soft.   Musculoskeletal:         General: Swelling present.      Cervical back: Neck supple.   Skin:     General: Skin is warm and dry.      Capillary Refill: Capillary refill takes less than 2 seconds.      Comments: Wound of the left hand reviewed today dressing  changed indication of some    Purulent drainage from the wound when dressing was changed.  Fresh dressing applied wound care instructions reviewed with the patient continue oral antibiotics follow-up 1 week   Neurological:      Mental Status: He is alert.   Psychiatric:         Mood and Affect: Mood normal.       Medications have been reviewed by provider in current encounter    Judd Ji MD

## 2024-01-26 NOTE — ASSESSMENT & PLAN NOTE
Current assessment is cellulitis of the left hand indicates that gradual improvement since initial dog bite.  Patient continues on Augmentin 875-125 every 12 hours for period of 2 weeks from discharge.  No systemic symptoms of fever or chills.  Wound care reviewed with the patient no side effects of antibiotic therapy at this time follow-up in 1 week.

## 2024-01-26 NOTE — ASSESSMENT & PLAN NOTE
Blood pressure assessment confirms good control recommend continuation of current hypertensive therapy

## 2024-02-01 ENCOUNTER — OFFICE VISIT (OUTPATIENT)
Dept: INTERNAL MEDICINE CLINIC | Facility: CLINIC | Age: 59
End: 2024-02-01
Payer: MEDICARE

## 2024-02-01 ENCOUNTER — APPOINTMENT (OUTPATIENT)
Dept: LAB | Facility: HOSPITAL | Age: 59
End: 2024-02-01
Payer: MEDICARE

## 2024-02-01 VITALS
TEMPERATURE: 97.2 F | HEART RATE: 95 BPM | OXYGEN SATURATION: 96 % | WEIGHT: 270.8 LBS | HEIGHT: 72 IN | BODY MASS INDEX: 36.68 KG/M2

## 2024-02-01 DIAGNOSIS — T30.0 BURN: ICD-10-CM

## 2024-02-01 DIAGNOSIS — L03.90 CELLULITIS: ICD-10-CM

## 2024-02-01 DIAGNOSIS — L03.119 CELLULITIS OF HAND: Primary | ICD-10-CM

## 2024-02-01 DIAGNOSIS — N28.9 RENAL INSUFFICIENCY: ICD-10-CM
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
## 2024-02-01 LAB
ANION GAP SERPL CALCULATED.3IONS-SCNC: 7 MMOL/L
BUN SERPL-MCNC: 31 MG/DL (ref 5–25)
CALCIUM SERPL-MCNC: 9.4 MG/DL (ref 8.4–10.2)
CHLORIDE SERPL-SCNC: 101 MMOL/L (ref 96–108)
CO2 SERPL-SCNC: 27 MMOL/L (ref 21–32)
CREAT SERPL-MCNC: 1.39 MG/DL (ref 0.6–1.3)
GFR SERPL CREATININE-BSD FRML MDRD: 54 ML/MIN/1.73SQ M
GLUCOSE SERPL-MCNC: 149 MG/DL (ref 65–140)
POTASSIUM SERPL-SCNC: 4.6 MMOL/L (ref 3.5–5.3)
SODIUM SERPL-SCNC: 135 MMOL/L (ref 135–147)

## 2024-02-01 PROCEDURE — 36415 COLL VENOUS BLD VENIPUNCTURE: CPT

## 2024-02-01 PROCEDURE — 99214 OFFICE O/P EST MOD 30 MIN: CPT | Performed by: INTERNAL MEDICINE

## 2024-02-01 PROCEDURE — 80048 BASIC METABOLIC PNL TOTAL CA: CPT

## 2024-02-01 RX ORDER — SEMAGLUTIDE 0.68 MG/ML
INJECTION, SOLUTION SUBCUTANEOUS
COMMUNITY
Start: 2024-01-19

## 2024-02-01 RX ORDER — AMOXICILLIN AND CLAVULANATE POTASSIUM 875; 125 MG/1; MG/1
1 TABLET, FILM COATED ORAL EVERY 12 HOURS SCHEDULED
Qty: 14 TABLET | Refills: 0 | Status: SHIPPED | OUTPATIENT
Start: 2024-02-01 | End: 2024-02-08 | Stop reason: SDUPTHER

## 2024-02-01 RX ORDER — KETOCONAZOLE 20 MG/G
CREAM TOPICAL
COMMUNITY
Start: 2024-01-25

## 2024-02-01 RX ORDER — AMMONIUM LACTATE 12 G/100G
LOTION TOPICAL
COMMUNITY
Start: 2024-01-25

## 2024-02-02 ENCOUNTER — OFFICE VISIT (OUTPATIENT)
Dept: OBGYN CLINIC | Facility: CLINIC | Age: 59
End: 2024-02-02
Payer: MEDICARE

## 2024-02-02 VITALS
BODY MASS INDEX: 36.57 KG/M2 | SYSTOLIC BLOOD PRESSURE: 136 MMHG | DIASTOLIC BLOOD PRESSURE: 74 MMHG | HEIGHT: 72 IN | WEIGHT: 270 LBS

## 2024-02-02 DIAGNOSIS — L08.9 ANIMAL BITE OF LEFT HAND WITH INFECTION, SUBSEQUENT ENCOUNTER: Primary | ICD-10-CM

## 2024-02-02 DIAGNOSIS — S61.452D ANIMAL BITE OF LEFT HAND WITH INFECTION, SUBSEQUENT ENCOUNTER: Primary | ICD-10-CM

## 2024-02-02 PROCEDURE — 99213 OFFICE O/P EST LOW 20 MIN: CPT | Performed by: ORTHOPAEDIC SURGERY

## 2024-02-02 NOTE — ASSESSMENT & PLAN NOTE
Cellulitis of the left hand still with open wound and mild drainage with erythema around the wound recommend extension of antibiotic therapy for an additional 7 days Augmentin 875-125 every 12 hours.  Follow-up in 1 week Fresh dressing applied during today's visit
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
## 2024-02-02 NOTE — ASSESSMENT & PLAN NOTE
Burn of middle finger of the right hand draining purulent fluid wound was redressed and patient provided with Silvadene cream which should be applied twice a day with fresh dressing.  Antibiotic therapy Augmentin 875-125 twice a day for 7 days follow-up in 1 week

## 2024-02-08 ENCOUNTER — APPOINTMENT (OUTPATIENT)
Dept: LAB | Facility: HOSPITAL | Age: 59
End: 2024-02-08
Payer: MEDICARE

## 2024-02-08 ENCOUNTER — OFFICE VISIT (OUTPATIENT)
Dept: INTERNAL MEDICINE CLINIC | Facility: CLINIC | Age: 59
End: 2024-02-08
Payer: MEDICARE

## 2024-02-08 VITALS
HEART RATE: 74 BPM | WEIGHT: 270 LBS | HEIGHT: 72 IN | RESPIRATION RATE: 17 BRPM | SYSTOLIC BLOOD PRESSURE: 128 MMHG | DIASTOLIC BLOOD PRESSURE: 76 MMHG | BODY MASS INDEX: 36.57 KG/M2 | OXYGEN SATURATION: 97 %

## 2024-02-08 DIAGNOSIS — T30.0 BURN: ICD-10-CM

## 2024-02-08 DIAGNOSIS — L03.90 CELLULITIS: ICD-10-CM
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
## 2024-02-08 DIAGNOSIS — I10 ESSENTIAL HYPERTENSION: ICD-10-CM

## 2024-02-08 PROBLEM — L03.115 CELLULITIS OF RIGHT FOOT: Status: RESOLVED | Noted: 2023-11-29 | Resolved: 2024-02-08

## 2024-02-08 LAB
CREAT UR-MCNC: 75.7 MG/DL
MICROALBUMIN UR-MCNC: 33.1 MG/L
MICROALBUMIN/CREAT 24H UR: 44 MG/G CREATININE (ref 0–30)

## 2024-02-08 PROCEDURE — 82043 UR ALBUMIN QUANTITATIVE: CPT

## 2024-02-08 PROCEDURE — 99214 OFFICE O/P EST MOD 30 MIN: CPT | Performed by: INTERNAL MEDICINE

## 2024-02-08 PROCEDURE — 82570 ASSAY OF URINE CREATININE: CPT

## 2024-02-08 RX ORDER — AMOXICILLIN AND CLAVULANATE POTASSIUM 875; 125 MG/1; MG/1
1 TABLET, FILM COATED ORAL EVERY 12 HOURS SCHEDULED
Qty: 14 TABLET | Refills: 0 | Status: SHIPPED | OUTPATIENT
Start: 2024-02-08 | End: 2024-02-15

## 2024-02-08 NOTE — ASSESSMENT & PLAN NOTE
And suffered a fresh dog bite from his personal dog earlier today this involves the area of the right hand in the region of the thumb.  Several penetrating wounds were noted.  Wound care was provided to the patient including cleansing of the wounds with hydrogen peroxide application of triple antibiotic and nonadherent dressing applied.  A prescription for 7 days of Augmentin has been sent to the patient's pharmacy and a follow-up visit has been requested in 1 week proper wound care reviewed with the patient

## 2024-02-08 NOTE — PROGRESS NOTES
Name: Yoni Castillo      : 1965      MRN: [id number]  Encounter Provider: Judd Ji MD  Encounter Date: 2024   Encounter department: [facility] INTERNAL MEDICINE    Assessment & Plan     1. Essential hypertension  Assessment & Plan:  Pressure assessment confirms good control recommend continuation of current therapy      2. Dog bite, initial encounter  Assessment & Plan:  And suffered a fresh dog bite from his personal dog earlier today this involves the area of the right hand in the region of the thumb.  Several penetrating wounds were noted.  Wound care was provided to the patient including cleansing of the wounds with hydrogen peroxide application of triple antibiotic and nonadherent dressing applied.  A prescription for 7 days of Augmentin has been sent to the patient's pharmacy and a follow-up visit has been requested in 1 week proper wound care reviewed with the patient    Orders:  -     amoxicillin-clavulanate (AUGMENTIN) 875-125 mg per tablet; Take 1 tablet by mouth every 12 (twelve) hours for 7 days    3. Cellulitis  -     amoxicillin-clavulanate (AUGMENTIN) 875-125 mg per tablet; Take 1 tablet by mouth every 12 (twelve) hours for 7 days    4. Burn  Assessment & Plan:  Finger burn is improving nicely no evidence of any cellulitis recommend continued use of Silvadene cream and covering the wound.             Subjective      This 67-year-old gentleman returns to our office today for follow-up assessment of dog bite wound of his left hand.  This is healed nicely with no evidence of residual infection.  We have also reviewed the patient's burn of his finger which is healing nicely with no sign of infection.    Unfortunately the patient has sustained a new dog bite to his right hand earlier today with multiple penetrating wounds noted in the hand around the thumb region.      Review of Systems   Skin:         Fresh dog bite right hand       Current Outpatient Medications on  File Prior to Visit   Medication Sig    Accu-Chek FastClix Lancets MISC Use to test blood sugar once a day    Alpha-Lipoic Acid 600 MG CAPS Take 600 mg by mouth 2 (two) times a day      ammonium lactate (LAC-HYDRIN) 12 % lotion     Apple Cider Vinegar 300 MG TABS Take 300 mg by mouth daily      Ascorbic Acid (vitamin C) 1000 MG tablet Take 1,000 mg by mouth 2 (two) times a day 2 tablet twice a day    BENFOTIAMINE PO     beta carotene 30 MG capsule Take 30 mg by mouth 2 (two) times a day      Blood Glucose Monitoring Suppl (Accu-Chek Guide Me) w/Device KIT Use to check blood sugar once a day    carvedilol (COREG) 25 mg tablet Take 1 tablet (25 mg total) by mouth 2 (two) times a day with meals    Chromium Picolinate 200 MCG TABS Take by mouth    Empagliflozin (Jardiance) 25 MG TABS Take 1 tablet (25 mg total) by mouth daily (Patient taking differently: Take 25 mg by mouth daily at bedtime)    Garlic 1200 MG CAPS Take by mouth 2 (two) times a day     glucose blood (Accu-Chek Guide) test strip Use to check blood sugar once a day    IRON, FERROUS SULFATE, PO Take by mouth    ketoconazole (NIZORAL) 2 % cream     ketorolac (ACULAR) 0.5 % ophthalmic solution     lidocaine (Lidoderm) 5 % Apply 1 patch topically over 12 hours daily Remove & Discard patch within 12 hours or as directed by MD    lisinopril-hydrochlorothiazide (PRINZIDE,ZESTORETIC) 20-12.5 MG per tablet Take 1 tablet by mouth 2 (two) times a day    Lutein-Bilberry (LUTEIN PLUS BILBERRY PO) Take by mouth    Ozempic, 0.25 or 0.5 MG/DOSE, 2 MG/3ML injection pen     Pyridoxine HCl (B-6 PO) Take by mouth    semaglutide, 0.25 or 0.5 mg/dose, (Ozempic, 0.25 or 0.5 MG/DOSE,) 2 mg/1.5 mL injection pen 0.5 mg weekly (Patient taking differently: Inject 0.5 mg under the skin every 7 days 0.5 mg weekly)    traMADol (Ultram) 50 mg tablet 2 tablets in evening as needed for severe pain relief    Turmeric (QC TUMERIC COMPLEX PO) Take 1,000 mg by mouth once Tumeric /curcimin     vitamin B-12 (CYANOCOBALAMIN) 100 MCG TABS Take 50 mcg by mouth daily    VITAMIN D PO Take by mouth 2 (two) times a day    vitamin E, tocopherol, 400 units capsule Take 400 Units by mouth 2 (two) times a day    Zinc 50 MG CAPS Take by mouth 2 (two) times a day     [DISCONTINUED] amoxicillin-clavulanate (AUGMENTIN) 875-125 mg per tablet Take 1 tablet by mouth every 12 (twelve) hours for 7 days       Objective     /76   Pulse 74   Resp 17   Ht 6' (1.829 m)   Wt 122 kg (270 lb)   SpO2 97%   BMI 36.62 kg/m²     Physical Exam  Skin:     Comments: Healed dog bite wound of the left hand fresh dog bite wound of the right hand around the thumb.       Judd Ji MD

## 2024-02-08 NOTE — ASSESSMENT & PLAN NOTE
Finger burn is improving nicely no evidence of any cellulitis recommend continued use of Silvadene cream and covering the wound.

## 2024-02-15 ENCOUNTER — OFFICE VISIT (OUTPATIENT)
Dept: INTERNAL MEDICINE CLINIC | Facility: CLINIC | Age: 59
End: 2024-02-15
Payer: MEDICARE

## 2024-02-15 VITALS
WEIGHT: 270 LBS | HEIGHT: 72 IN | OXYGEN SATURATION: 97 % | BODY MASS INDEX: 36.57 KG/M2 | HEART RATE: 77 BPM | RESPIRATION RATE: 15 BRPM

## 2024-02-15 DIAGNOSIS — T30.0 BURN: ICD-10-CM

## 2024-02-15 DIAGNOSIS — L03.119 CELLULITIS OF HAND: ICD-10-CM
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
## 2024-02-15 PROCEDURE — 99213 OFFICE O/P EST LOW 20 MIN: CPT | Performed by: INTERNAL MEDICINE

## 2024-02-15 NOTE — ASSESSMENT & PLAN NOTE
Burn to finger is healing nicely wound is gradually decreasing in size no indication of infection no further antibiotic therapy is necessary

## 2024-02-15 NOTE — PROGRESS NOTES
Name: Yoni Castillo      : 1965      MRN: [id number]  Encounter Provider: Judd Ji MD  Encounter Date: 2/15/2024   Encounter department: [facility] INTERNAL MEDICINE    Assessment & Plan     1. Dog bite, subsequent encounter  Assessment & Plan:  Dog bite to the right hand is healing nicely wounds are now closed over with eschar no indication of infection antibiotics are completed no further treatment necessary unless relapse of symptoms      2. Burn  Assessment & Plan:  Burn to finger is healing nicely wound is gradually decreasing in size no indication of infection no further antibiotic therapy is necessary      3. Cellulitis of hand  Assessment & Plan:  Cellulitis of left hand has resolved.  No further treatment is necessary.             Subjective      This patient returns today for a follow-up assessment of wounds of both hands.  Dog bite wound of his right hand is healed nicely with no evidence of any infection.  Burn of the finger is also healed nicely with no indication of infection.      Review of Systems   Skin:         Penetrating wounds of both hands are improving with no indication of infection at this time burn to finger is also and he will improving and healing nicely       Current Outpatient Medications on File Prior to Visit   Medication Sig    Accu-Chek FastClix Lancets MISC Use to test blood sugar once a day    Alpha-Lipoic Acid 600 MG CAPS Take 600 mg by mouth 2 (two) times a day      ammonium lactate (LAC-HYDRIN) 12 % lotion     amoxicillin-clavulanate (AUGMENTIN) 875-125 mg per tablet Take 1 tablet by mouth every 12 (twelve) hours for 7 days    Apple Cider Vinegar 300 MG TABS Take 300 mg by mouth daily      Ascorbic Acid (vitamin C) 1000 MG tablet Take 1,000 mg by mouth 2 (two) times a day 2 tablet twice a day    BENFOTIAMINE PO     beta carotene 30 MG capsule Take 30 mg by mouth 2 (two) times a day      Blood Glucose Monitoring Suppl (Accu-Chek Guide Me) w/Device  KIT Use to check blood sugar once a day    carvedilol (COREG) 25 mg tablet Take 1 tablet (25 mg total) by mouth 2 (two) times a day with meals    Chromium Picolinate 200 MCG TABS Take by mouth    Empagliflozin (Jardiance) 25 MG TABS Take 1 tablet (25 mg total) by mouth daily (Patient taking differently: Take 25 mg by mouth daily at bedtime)    Garlic 1200 MG CAPS Take by mouth 2 (two) times a day     glucose blood (Accu-Chek Guide) test strip Use to check blood sugar once a day    IRON, FERROUS SULFATE, PO Take by mouth    ketoconazole (NIZORAL) 2 % cream     ketorolac (ACULAR) 0.5 % ophthalmic solution     lidocaine (Lidoderm) 5 % Apply 1 patch topically over 12 hours daily Remove & Discard patch within 12 hours or as directed by MD    lisinopril-hydrochlorothiazide (PRINZIDE,ZESTORETIC) 20-12.5 MG per tablet Take 1 tablet by mouth 2 (two) times a day    Lutein-Bilberry (LUTEIN PLUS BILBERRY PO) Take by mouth    Ozempic, 0.25 or 0.5 MG/DOSE, 2 MG/3ML injection pen     Pyridoxine HCl (B-6 PO) Take by mouth    semaglutide, 0.25 or 0.5 mg/dose, (Ozempic, 0.25 or 0.5 MG/DOSE,) 2 mg/1.5 mL injection pen 0.5 mg weekly (Patient taking differently: Inject 0.5 mg under the skin every 7 days 0.5 mg weekly)    traMADol (Ultram) 50 mg tablet 2 tablets in evening as needed for severe pain relief    Turmeric (QC TUMERIC COMPLEX PO) Take 1,000 mg by mouth once Tumeric /curcimin    vitamin B-12 (CYANOCOBALAMIN) 100 MCG TABS Take 50 mcg by mouth daily    VITAMIN D PO Take by mouth 2 (two) times a day    vitamin E, tocopherol, 400 units capsule Take 400 Units by mouth 2 (two) times a day    Zinc 50 MG CAPS Take by mouth 2 (two) times a day        Objective     Pulse 77   Resp 15   Ht 6' (1.829 m)   Wt 122 kg (270 lb)   SpO2 97%   BMI 36.62 kg/m²     Physical Exam  Skin:     Comments: All wounds healing nicely no evidence of infection       Judd Ji MD

## 2024-02-15 NOTE — ASSESSMENT & PLAN NOTE
Dog bite to the right hand is healing nicely wounds are now closed over with eschar no indication of infection antibiotics are completed no further treatment necessary unless relapse of symptoms

## 2024-02-16 ENCOUNTER — OFFICE VISIT (OUTPATIENT)
Dept: OBGYN CLINIC | Facility: CLINIC | Age: 59
End: 2024-02-16
Payer: MEDICARE

## 2024-02-16 VITALS
DIASTOLIC BLOOD PRESSURE: 80 MMHG | BODY MASS INDEX: 36.57 KG/M2 | WEIGHT: 270 LBS | HEIGHT: 72 IN | SYSTOLIC BLOOD PRESSURE: 152 MMHG

## 2024-02-16 DIAGNOSIS — S61.452D ANIMAL BITE OF LEFT HAND WITH INFECTION, SUBSEQUENT ENCOUNTER: Primary | ICD-10-CM

## 2024-02-16 DIAGNOSIS — L08.9 ANIMAL BITE OF LEFT HAND WITH INFECTION, SUBSEQUENT ENCOUNTER: Primary | ICD-10-CM

## 2024-02-16 PROCEDURE — 99213 OFFICE O/P EST LOW 20 MIN: CPT | Performed by: ORTHOPAEDIC SURGERY

## 2024-02-16 NOTE — PROGRESS NOTES
HAND & UPPER EXTREMITY OFFICE VISIT   Referred By:  No referring provider defined for this encounter.      Chief Complaint:     Left hand pain    Previous History:   Patient with a left hand infection status post dog bite on 1/16/2024 requiring bedside I&D and hospital admission for IV antibiotics.  Also had a burn to the ring finger noted last visit while cooking with some concern for early infection.  Patient was last seen 2/2/2024 and his wounds were inspected.  His wounds were healing well and he was to continue wound care    Interval History:  Doing well. Since last visit his wounds have healed well. He has no fever or chills noted and no signs of infection or drainage noted from any wound.   He does have some continued pain at his Left volar thumb wounds when he has to use his hand but other wise no pain.  His burn on his Right ring finger tip is healing well and he has had increased sensation of the digit. His PCP placed him on another week of antibiotics    He has bumps on his left index and right middle finger which have been chronically present and bothersome that he would like to discuss management of his well today.    Past Medical History:  Past Medical History:   Diagnosis Date    Chronic kidney disease 2012    Chronic pain disorder     Diabetes mellitus (HCC)     H/O eye surgery     High blood sugar     Hypertension     Liver disease      Past Surgical History:   Procedure Laterality Date    HERNIA REPAIR      INCISION AND DRAINAGE  01/16/2024    KIDNEY SURGERY      KNEE SURGERY  1980    MOUTH SURGERY      MO AMPUTATION METATARSAL W/TOE SINGLE Left 6/1/2022    Procedure: RAY RESECTION FOOT;  Surgeon: Hannah Melgoza DPM;  [facility];  Service: Podiatry    MO RPR AA HERNIA 1ST < 3 CM REDUCIBLE N/A 7/14/2023    Procedure: REPAIR HERNIA INCISIONAL;  Surgeon: Matt Melo MD;  Location[facility];  Service: General    WOUND DEBRIDEMENT Left 4/28/2022    Procedure: Left foot  washout;  Surgeon: Hannah Melgoza DPM;  [facility];  Service: Podiatry    WOUND DEBRIDEMENT Left 6/6/2022    Procedure: DEBRIDEMENT WOUND (WASH OUT);  Surgeon: Hannah Melgoza DPM;  [facility];  Service: Podiatry     Family History   Problem Relation Age of Onset    Diabetes Paternal Grandfather     Diabetes Paternal Grandmother      Social History     Socioeconomic History    Marital status: Registered Domestic Partner     Spouse name: Not on file    Number of children: Not on file    Years of education: Not on file    Highest education level: Not on file   Occupational History    Not on file   Tobacco Use    Smoking status: Never    Smokeless tobacco: Never   Vaping Use    Vaping status: Never Used   Substance and Sexual Activity    Alcohol use: Yes     Alcohol/week: 1.0 standard drink of alcohol     Types: 1 Cans of beer per week     Comment: ocassional    Drug use: Never    Sexual activity: Not Currently     Partners: Female     Birth control/protection: Abstinence   Other Topics Concern    Not on file   Social History Narrative    Not on file     Social Determinants of Health     Financial Resource Strain: Not on file   Food Insecurity: Food Insecurity Present (1/17/2024)    Hunger Vital Sign     Worried About Running Out of Food in the Last Year: Sometimes true     Ran Out of Food in the Last Year: Sometimes true   Transportation Needs: No Transportation Needs (1/17/2024)    PRAPARE - Transportation     Lack of Transportation (Medical): No     Lack of Transportation (Non-Medical): No   Physical Activity: Not on file   Stress: Not on file   Social Connections: Not on file   Intimate Partner Violence: Not on file   Housing Stability: Low Risk  (1/17/2024)    Housing Stability Vital Sign     Unable to Pay for Housing in the Last Year: No     Number of Places Lived in the Last Year: 1     Unstable Housing in the Last Year: No     Scheduled Meds:  Continuous Infusions:No current  facility-administered medications for this visit.    PRN Meds:.  Allergies   Allergen Reactions    Cephalexin Hives     Skin peeling  Tolerates penicillins (tolerated unasyn and zosyn)    Metformin GI Intolerance       Physical Examination:    /80   Ht 6' (1.829 m)   Wt 122 kg (270 lb)   BMI 36.62 kg/m²     Gen: A&Ox3, NAD  Cardiac: regular rate  Chest: non labored breathing  Abdomen: Non-distended      Right Upper Extremity:  Skin CDI  Open wound at tip of ring finger healing well  No obvious deformity of the shoulder, arm, elbow, forearm, wrist, hand  Sensation intact to light touch in the axillary median, ulnar, and radial nerve distributions  motor intact  2+RP      Left Upper Extremity:  Incision sites continue to heal well. All wounds are closed and there is no signs infections  No obvious deformity of the shoulder, arm, elbow, forearm, wrist, hand  He has a mass to the dorsal aspect of the index finger DIP joint consistent with a mucous cyst.  This is firm, nonmobile and minimally tender  Sensation intact to light touch in the axillary median, ulnar, and radial nerve distributions  motor intact  2+RP  Composite fist    Studies:  No new images reviewed      Assessment and Plan:  1. Animal bite of left hand with infection, subsequent encounter            59 y.o. male presents in follow up for the above diagnosis.     His wounds are healing well and there are no signs of further infection.  The pain at his two thenar eminence wounds should improve as he uses his hands  His finger burn is healing well also. He should finish his antibiotics as prescribed    I inspected his DIP joints and he does have a couple mucous cyst most prominently at the left index finger DIP joint.  He is usually a sign of underlying arthritis may be accompanied by an osteophyte.  We discussed the treatment options include observation or excision.  I do not think that aspirating these mucous cyst is a good idea to the risk of  infection.  He is interested in surgical intervention to remove the cyst from his left index finger but I advised waiting until he has completed all oral antibiotics and his infections are a more remote history.  He is in agreement with this.  He will follow-up in 1 month for repeat evaluation.    he expressed understanding of the plan and agreed. We encouraged them to contact our office with any questions or concerns.       Carroll Mccarthy MD  Hand and Upper Extremity Surgery      *This note was dictated using Dragon voice recognition software. Please excuse any word substitutions or errors.*

## 2024-02-20 DIAGNOSIS — M79.605 LEFT LEG PAIN: ICD-10-CM

## 2024-02-20 RX ORDER — TRAMADOL HYDROCHLORIDE 50 MG/1
TABLET ORAL
Qty: 60 TABLET | Refills: 0 | Status: SHIPPED | OUTPATIENT
Start: 2024-02-20

## 2024-02-20 RX ORDER — TRAMADOL HYDROCHLORIDE 50 MG/1
TABLET ORAL
Qty: 60 TABLET | Refills: 0 | Status: SHIPPED | OUTPATIENT
Start: 2024-02-20 | End: 2024-02-20 | Stop reason: SDUPTHER

## 2024-02-27 ENCOUNTER — TELEPHONE (OUTPATIENT)
Dept: INTERNAL MEDICINE CLINIC | Facility: CLINIC | Age: 59
End: 2024-02-27

## 2024-02-27 NOTE — TELEPHONE ENCOUNTER
In the intensity of the patient's pain and recommended emergency room evaluation I am hesitant to prescribe medication not knowing exactly what is the cause of his discomfort.  Please contact the patient and tell him to go to the emergency room.  Q

## 2024-02-27 NOTE — TELEPHONE ENCOUNTER
Ed called asking if you could prescribe something for back pain he is having or would he need to go to the ED?.  His pain level is 9/10 of the lower left side.  It started on Wednesday.  The pain is between the naval and the hip, not close to the spine.  He has a hard time getting up from a sitting position  and going up stairs.

## 2024-03-01 ENCOUNTER — APPOINTMENT (EMERGENCY)
Dept: CT IMAGING | Facility: HOSPITAL | Age: 59
End: 2024-03-01
Payer: MEDICARE

## 2024-03-01 ENCOUNTER — HOSPITAL ENCOUNTER (EMERGENCY)
Facility: HOSPITAL | Age: 59
Discharge: HOME | End: 2024-03-01
Attending: EMERGENCY MEDICINE
Payer: MEDICARE

## 2024-03-01 VITALS
DIASTOLIC BLOOD PRESSURE: 85 MMHG | SYSTOLIC BLOOD PRESSURE: 127 MMHG | HEART RATE: 86 BPM | OXYGEN SATURATION: 95 % | RESPIRATION RATE: 20 BRPM | BODY MASS INDEX: 36.57 KG/M2 | TEMPERATURE: 98.9 F | WEIGHT: 269.62 LBS

## 2024-03-01 DIAGNOSIS — R10.9 FLANK PAIN: Primary | ICD-10-CM

## 2024-03-01 LAB
ALBUMIN SERPL BCP-MCNC: 4.2 G/DL (ref 3.5–5)
ALP SERPL-CCNC: 44 U/L (ref 34–104)
ALT SERPL W P-5'-P-CCNC: 15 U/L (ref 7–52)
ANION GAP SERPL CALCULATED.3IONS-SCNC: 8 MMOL/L
AST SERPL W P-5'-P-CCNC: 13 U/L (ref 13–39)
BASOPHILS # BLD AUTO: 0.03 THOUSANDS/ÂΜL (ref 0–0.1)
BASOPHILS NFR BLD AUTO: 1 % (ref 0–1)
BILIRUB SERPL-MCNC: 0.55 MG/DL (ref 0.2–1)
BUN SERPL-MCNC: 38 MG/DL (ref 5–25)
CALCIUM SERPL-MCNC: 9.7 MG/DL (ref 8.4–10.2)
CHLORIDE SERPL-SCNC: 101 MMOL/L (ref 96–108)
CO2 SERPL-SCNC: 24 MMOL/L (ref 21–32)
CREAT SERPL-MCNC: 1.66 MG/DL (ref 0.6–1.3)
EOSINOPHIL # BLD AUTO: 0.24 THOUSAND/ÂΜL (ref 0–0.61)
EOSINOPHIL NFR BLD AUTO: 4 % (ref 0–6)
ERYTHROCYTE [DISTWIDTH] IN BLOOD BY AUTOMATED COUNT: 13.1 % (ref 11.6–15.1)
GFR SERPL CREATININE-BSD FRML MDRD: 43 ML/MIN/1.73SQ M
GLUCOSE SERPL-MCNC: 146 MG/DL (ref 65–140)
HCT VFR BLD AUTO: 41.9 % (ref 36.5–49.3)
HGB BLD-MCNC: 14.1 G/DL (ref 12–17)
IMM GRANULOCYTES # BLD AUTO: 0.01 THOUSAND/UL (ref 0–0.2)
IMM GRANULOCYTES NFR BLD AUTO: 0 % (ref 0–2)
LIPASE SERPL-CCNC: 40 U/L (ref 11–82)
LYMPHOCYTES # BLD AUTO: 1.3 THOUSANDS/ÂΜL (ref 0.6–4.47)
LYMPHOCYTES NFR BLD AUTO: 24 % (ref 14–44)
MCH RBC QN AUTO: 30.2 PG (ref 26.8–34.3)
MCHC RBC AUTO-ENTMCNC: 33.7 G/DL (ref 31.4–37.4)
MCV RBC AUTO: 90 FL (ref 82–98)
MONOCYTES # BLD AUTO: 0.5 THOUSAND/ÂΜL (ref 0.17–1.22)
MONOCYTES NFR BLD AUTO: 9 % (ref 4–12)
NEUTROPHILS # BLD AUTO: 3.41 THOUSANDS/ÂΜL (ref 1.85–7.62)
NEUTS SEG NFR BLD AUTO: 62 % (ref 43–75)
NRBC BLD AUTO-RTO: 0 /100 WBCS
PLATELET # BLD AUTO: 244 THOUSANDS/UL (ref 149–390)
PMV BLD AUTO: 10.1 FL (ref 8.9–12.7)
POTASSIUM SERPL-SCNC: 4.8 MMOL/L (ref 3.5–5.3)
PROT SERPL-MCNC: 7.6 G/DL (ref 6.4–8.4)
RBC # BLD AUTO: 4.67 MILLION/UL (ref 3.88–5.62)
SODIUM SERPL-SCNC: 133 MMOL/L (ref 135–147)
WBC # BLD AUTO: 5.49 THOUSAND/UL (ref 4.31–10.16)

## 2024-03-01 PROCEDURE — 96374 THER/PROPH/DIAG INJ IV PUSH: CPT

## 2024-03-01 PROCEDURE — 36415 COLL VENOUS BLD VENIPUNCTURE: CPT

## 2024-03-01 PROCEDURE — 74177 CT ABD & PELVIS W/CONTRAST: CPT

## 2024-03-01 PROCEDURE — 85025 COMPLETE CBC W/AUTO DIFF WBC: CPT

## 2024-03-01 PROCEDURE — 99284 EMERGENCY DEPT VISIT MOD MDM: CPT

## 2024-03-01 PROCEDURE — 80053 COMPREHEN METABOLIC PANEL: CPT

## 2024-03-01 PROCEDURE — 96375 TX/PRO/DX INJ NEW DRUG ADDON: CPT

## 2024-03-01 PROCEDURE — 83690 ASSAY OF LIPASE: CPT

## 2024-03-01 RX ORDER — ACETAMINOPHEN 325 MG/1
650 TABLET ORAL ONCE
Status: COMPLETED | OUTPATIENT
Start: 2024-03-01 | End: 2024-03-01

## 2024-03-01 RX ORDER — ONDANSETRON 2 MG/ML
4 INJECTION INTRAMUSCULAR; INTRAVENOUS ONCE
Status: COMPLETED | OUTPATIENT
Start: 2024-03-01 | End: 2024-03-01

## 2024-03-01 RX ORDER — LIDOCAINE 50 MG/G
2 PATCH TOPICAL ONCE
Status: DISCONTINUED | OUTPATIENT
Start: 2024-03-01 | End: 2024-03-02 | Stop reason: HOSPADM

## 2024-03-01 RX ORDER — HYDROMORPHONE HCL/PF 1 MG/ML
0.5 SYRINGE (ML) INJECTION ONCE
Status: COMPLETED | OUTPATIENT
Start: 2024-03-01 | End: 2024-03-01

## 2024-03-01 RX ADMIN — IOHEXOL 100 ML: 350 INJECTION, SOLUTION INTRAVENOUS at 20:52

## 2024-03-01 RX ADMIN — LIDOCAINE 2 PATCH: 700 PATCH TOPICAL at 22:12

## 2024-03-01 RX ADMIN — HYDROMORPHONE HYDROCHLORIDE 0.5 MG: 0.5 INJECTION, SOLUTION INTRAMUSCULAR; INTRAVENOUS; SUBCUTANEOUS at 19:00

## 2024-03-01 RX ADMIN — ACETAMINOPHEN 325MG 650 MG: 325 TABLET ORAL at 18:56

## 2024-03-01 RX ADMIN — ONDANSETRON 4 MG: 2 INJECTION INTRAMUSCULAR; INTRAVENOUS at 18:57

## 2024-03-01 NOTE — ED PROVIDER NOTES
Chief Complaint   Patient presents with    Flank Pain     Left flank pain radiates to abd started on Wed also with chills and loss of apatite      History of Present Illness and Review of Systems   This is a 59 y.o. male with PMH significant for CKD, DM, HTN, Chronic Pain coming in today with complaint of flank pain.  The patient states he has been having left-sided flank pain since Wednesday.  Describes it as colicky in nature, nonradiating.  He has no urinary symptoms.  No hematuria or dysuria.  Denies any fevers or infectious symptoms.  He has history of multiple hernia surgeries but no other abdominal surgery.  He has not had a bowel movement for 2 days however is passing gas.  He denies any chest pain or shortness of breath.  No neurosymptoms or episodes of syncope.  No falls or traumatic injuries.  He was hospitalized over a month ago for cellulitis of his left hand.  He says that since has resolved.  No other symptoms currently.    - No language barrier.     No other complaints for this encounter.    Remainder of ROS Reviewed and Non-Pertinent    Past Medical, Past Surgical History:    has a past medical history of Chronic kidney disease (2012), Chronic pain disorder, Diabetes mellitus (HCC), H/O eye surgery, High blood sugar, Hypertension, and Liver disease.   has a past surgical history that includes Hernia repair; Kidney surgery; Mouth surgery; Wound debridement (Left, 4/28/2022); pr amputation metatarsal w/toe single (Left, 6/1/2022); Wound debridement (Left, 6/6/2022); pr rpr aa hernia 1st < 3 cm reducible (N/A, 7/14/2023); Incision and Drainage (01/16/2024); and Knee surgery (1980).     Allergies:     Allergies   Allergen Reactions    Cephalexin Hives     Skin peeling  Tolerates penicillins (tolerated unasyn and zosyn)    Metformin GI Intolerance       Social and Family History:     Social History     Substance and Sexual Activity   Alcohol Use Yes    Alcohol/week: 1.0 standard drink of alcohol     Types: 1 Cans of beer per week    Comment: ocassional     Social History     Tobacco Use   Smoking Status Never   Smokeless Tobacco Never     Social History     Substance and Sexual Activity   Drug Use Never       Physical Examination     Vitals:    03/01/24 1829 03/01/24 1900   BP: 136/83 127/85   BP Location: Right arm Left arm   Pulse: 77 86   Resp: 20 20   Temp: 98.9 °F (37.2 °C)    TempSrc: Oral    SpO2: 98% 95%   Weight: 122 kg (269 lb 10 oz)        Physical Exam  Vitals and nursing note reviewed.   Constitutional:       General: He is not in acute distress.     Appearance: He is well-developed.   HENT:      Head: Normocephalic and atraumatic.   Eyes:      Conjunctiva/sclera: Conjunctivae normal.   Cardiovascular:      Rate and Rhythm: Normal rate and regular rhythm.      Heart sounds: No murmur heard.  Pulmonary:      Effort: Pulmonary effort is normal. No respiratory distress.      Breath sounds: Normal breath sounds.   Abdominal:      Palpations: Abdomen is soft.      Tenderness: There is abdominal tenderness in the left upper quadrant.   Musculoskeletal:         General: No swelling.      Cervical back: Neck supple.   Skin:     General: Skin is warm and dry.      Capillary Refill: Capillary refill takes less than 2 seconds.   Neurological:      General: No focal deficit present.      Mental Status: He is alert.      Cranial Nerves: No cranial nerve deficit.   Psychiatric:         Mood and Affect: Mood normal.           Procedures   Procedures      MDM:   Medical Decision Making  Yoni Castillo is a 59 y.o. who presents with complaints of abdominal pain    Vital signs are stable, physical exam shows the patient has left lower quadrant reproducible abdominal pain, no guarding or rebound, benign cardiorespiratory exam and no neurodeficits    Ddx: Clinically concerning for ureteral stone versus diverticulitis.  May be related to SBO as well.  No concerns for fall or traumatic mechanism.  Additionally consider  cardiac and respiratory etiologies however less likely based on exam and history.    Plan: Workup will include CBC, CMP, lipase, CT abdomen pelvis, UA, pain control. Will monitor closely and reassess.    Reassessment/Disposition: Workup negative for acute abnormality.  Pain controlled.  No indication for further workup or admission. Advised on return precautions and recommended close follow up.           Amount and/or Complexity of Data Reviewed  Labs: ordered.  Radiology: ordered.    Risk  OTC drugs.  Prescription drug management.        - Reviewed relevant past office visits/hospitalizations/procedures  -Obtained pertinent history that influenced decision making from the patient and family    ED Course as of 03/02/24 0035   Fri Mar 01, 2024   2157 CT shows no acute pathology   2158 Labs unremakrable   2209 Pain controlled. Pt requesting lidoderm patches prior to leaving.       Final Dispo   Final Diagnosis:  1. Flank pain      Time reflects when diagnosis was documented in both MDM as applicable and the Disposition within this note       Time User Action Codes Description Comment    3/1/2024 10:09 PM Richardson Soto Add [R10.9] Flank pain           ED Disposition       ED Disposition   Discharge    Condition   Stable    Date/Time   Fri Mar 1, 2024 10:09 PM    Comment   Yoni Castillo discharge to home/self care.                   Follow-up Information    None       Medications   acetaminophen (TYLENOL) tablet 650 mg (650 mg Oral Given 3/1/24 1856)   ondansetron (ZOFRAN) injection 4 mg (4 mg Intravenous Given 3/1/24 1857)   HYDROmorphone (DILAUDID) injection 0.5 mg (0.5 mg Intravenous Given 3/1/24 1900)   iohexol (OMNIPAQUE) 350 MG/ML injection (MULTI-DOSE) 100 mL (100 mL Intravenous Given 3/1/24 2052)       Risk Stratification Tools                Orders Placed This Encounter   Procedures    CT abdomen pelvis with contrast    CBC and differential    Comprehensive metabolic panel    Lipase       Labs:     Labs  Reviewed   COMPREHENSIVE METABOLIC PANEL - Abnormal       Result Value Ref Range Status    Sodium 133 (*) 135 - 147 mmol/L Final    Potassium 4.8  3.5 - 5.3 mmol/L Final    Chloride 101  96 - 108 mmol/L Final    CO2 24  21 - 32 mmol/L Final    ANION GAP 8  mmol/L Final    BUN 38 (*) 5 - 25 mg/dL Final    Creatinine 1.66 (*) 0.60 - 1.30 mg/dL Final    Comment: Standardized to IDMS reference method    Glucose 146 (*) 65 - 140 mg/dL Final    Comment: If the patient is fasting, the ADA then defines impaired fasting glucose as > 100 mg/dL and diabetes as > or equal to 123 mg/dL.    Calcium 9.7  8.4 - 10.2 mg/dL Final    AST 13  13 - 39 U/L Final    ALT 15  7 - 52 U/L Final    Comment: Specimen collection should occur prior to Sulfasalazine administration due to the potential for falsely depressed results.     Alkaline Phosphatase 44  34 - 104 U/L Final    Total Protein 7.6  6.4 - 8.4 g/dL Final    Albumin 4.2  3.5 - 5.0 g/dL Final    Total Bilirubin 0.55  0.20 - 1.00 mg/dL Final    Comment: Use of this assay is not recommended for patients undergoing treatment with eltrombopag due to the potential for falsely elevated results.  N-acetyl-p-benzoquinone imine (metabolite of Acetaminophen) will generate erroneously low results in samples for patients that have taken an overdose of Acetaminophen.    eGFR 43  ml/min/1.73sq m Final    Narrative:     [employer] guidelines for Chronic Kidney Disease (CKD):     Stage 1 with normal or high GFR (GFR > 90 mL/min/1.73 square meters)    Stage 2 Mild CKD (GFR = 60-89 mL/min/1.73 square meters)    Stage 3A Moderate CKD (GFR = 45-59 mL/min/1.73 square meters)    Stage 3B Moderate CKD (GFR = 30-44 mL/min/1.73 square meters)    Stage 4 Severe CKD (GFR = 15-29 mL/min/1.73 square meters)    Stage 5 End Stage CKD (GFR <15 mL/min/1.73 square meters)  Note: GFR calculation is accurate only with a steady state creatinine   LIPASE - Normal    Lipase 40  11 - 82 u/L Final  "  CBC AND DIFFERENTIAL    WBC 5.49  4.31 - 10.16 Thousand/uL Final    RBC 4.67  3.88 - 5.62 Million/uL Final    Hemoglobin 14.1  12.0 - 17.0 g/dL Final    Hematocrit 41.9  36.5 - 49.3 % Final    MCV 90  82 - 98 fL Final    MCH 30.2  26.8 - 34.3 pg Final    MCHC 33.7  31.4 - 37.4 g/dL Final    RDW 13.1  11.6 - 15.1 % Final    MPV 10.1  8.9 - 12.7 fL Final    Platelets 244  149 - 390 Thousands/uL Final    nRBC 0  /100 WBCs Final    Neutrophils Relative 62  43 - 75 % Final    Immat GRANS % 0  0 - 2 % Final    Lymphocytes Relative 24  14 - 44 % Final    Monocytes Relative 9  4 - 12 % Final    Eosinophils Relative 4  0 - 6 % Final    Basophils Relative 1  0 - 1 % Final    Neutrophils Absolute 3.41  1.85 - 7.62 Thousands/µL Final    Immature Grans Absolute 0.01  0.00 - 0.20 Thousand/uL Final    Lymphocytes Absolute 1.30  0.60 - 4.47 Thousands/µL Final    Monocytes Absolute 0.50  0.17 - 1.22 Thousand/µL Final    Eosinophils Absolute 0.24  0.00 - 0.61 Thousand/µL Final    Basophils Absolute 0.03  0.00 - 0.10 Thousands/µL Final       Imaging:     CT abdomen pelvis with contrast   Final Result by Hilario Fernandez MD (03/01 2151)      1.  Large calculi in the right kidney including a 2.8 x 1.7 x 2.0 cm staghorn calculus within the right renal pelvis. No hydronephrosis.   2.  Diverticulosis without evidence of diverticulitis.   3.  Coronary artery calcifications.      Workstation performed: [id number]            All details of the evaluation and treatment plan were made clear and additionally all questions and concerns were addressed while under my care.    Portions of the record may have been created with voice recognition software. Occasional wrong word or \"sound a like\" substitutions may have occurred due to the inherent limitations of voice recognition software. Read the chart carefully and recognize, using context, where substitutions have occurred.    The attending physician physically available and evaluated the above " patient alongside myself.      Richardson Soto MD  03/02/24 0035

## 2024-03-01 NOTE — ED ATTENDING ATTESTATION
3/1/2024  I, Hiren Trinidad MD, saw and evaluated the patient. I have discussed the patient with the resident/non-physician practitioner and agree with the resident's/non-physician practitioner's findings, Plan of Care, and MDM as documented in the resident's/non-physician practitioner's note, except where noted. All available labs and Radiology studies were reviewed.  I was present for key portions of any procedure(s) performed by the resident/non-physician practitioner and I was immediately available to provide assistance.       At this point I agree with the current assessment done in the Emergency Department.  I have conducted an independent evaluation of this patient a history and physical is as follows:    3 days of Left sided abdominal/flank pain, chills, decreased appetite, denies chest pain or shortness of breath, no fevers. Pt denies CP/SOB/N/V/D/C, no dysuria, burning on urination or blood in urine.    Gen: Pt is in NAD  HEENT: Head is atraumatic, EOM's intact, neck has FROM  Chest: CTAB, non-tender  Heart: RRR  Abdomen: Soft, Left ~CVA tenderness  Musculoskeletal: FROM in all extremities  Skin: No rash, no ecchymosis  Neuro: Awake, alert, oriented x4; Cranial nerves II-XII intact  Psych: Normal affect    MDM -  Will check urine for infection, CBC for leukocytosis, metabolic panel for electrolyte abnormalities and dehydration,  LFT's to assess GB dysfunction, lipase for pancreatitis, Will CT abdomen and pelvis to determine presence/location of kidney stone vs possible diverticulitis/colitis. Will give fluids, analgesia.    ED Course         Critical Care Time  Procedures

## 2024-03-02 NOTE — DISCHARGE INSTRUCTIONS
These are your CT findings:  Large calculi in the right kidney including a 2.8 x 1.7 x 2.0 cm staghorn calculus within the right renal pelvis. No hydronephrosis.  2.  Diverticulosis without evidence of diverticulitis.  3.  Coronary artery calcifications.    Follow up with your PCP and return to the ER if any worsening symptoms

## 2024-03-06 ENCOUNTER — APPOINTMENT (EMERGENCY)
Dept: CT IMAGING | Facility: HOSPITAL | Age: 59
End: 2024-03-06
Payer: MEDICARE

## 2024-03-06 ENCOUNTER — LAB (OUTPATIENT)
Dept: LAB | Facility: HOSPITAL | Age: 59
End: 2024-03-06
Payer: MEDICARE

## 2024-03-06 ENCOUNTER — HOSPITAL ENCOUNTER (EMERGENCY)
Facility: HOSPITAL | Age: 59
Discharge: HOME | End: 2024-03-06
Attending: EMERGENCY MEDICINE
Payer: MEDICARE

## 2024-03-06 VITALS
DIASTOLIC BLOOD PRESSURE: 99 MMHG | RESPIRATION RATE: 17 BRPM | OXYGEN SATURATION: 95 % | SYSTOLIC BLOOD PRESSURE: 154 MMHG | TEMPERATURE: 98.8 F | HEART RATE: 76 BPM | BODY MASS INDEX: 36.72 KG/M2 | WEIGHT: 270.73 LBS

## 2024-03-06 DIAGNOSIS — E66.01 CLASS 2 SEVERE OBESITY DUE TO EXCESS CALORIES WITH SERIOUS COMORBIDITY AND BODY MASS INDEX (BMI) OF 35.0 TO 35.9 IN ADULT: ICD-10-CM

## 2024-03-06 DIAGNOSIS — E11.69 TYPE 2 DIABETES MELLITUS WITH OTHER SPECIFIED COMPLICATION, WITHOUT LONG-TERM CURRENT USE OF INSULIN (HCC): ICD-10-CM

## 2024-03-06 DIAGNOSIS — E11.22 TYPE 2 DIABETES MELLITUS WITH CHRONIC KIDNEY DISEASE, WITHOUT LONG-TERM CURRENT USE OF INSULIN, UNSPECIFIED CKD STAGE (HCC): ICD-10-CM

## 2024-03-06 DIAGNOSIS — M54.50 LOW BACK PAIN: Primary | ICD-10-CM

## 2024-03-06 LAB
ALBUMIN SERPL BCP-MCNC: 4.4 G/DL (ref 3.5–5)
ALBUMIN SERPL BCP-MCNC: 4.5 G/DL (ref 3.5–5)
ALP SERPL-CCNC: 45 U/L (ref 34–104)
ALP SERPL-CCNC: 46 U/L (ref 34–104)
ALT SERPL W P-5'-P-CCNC: 15 U/L (ref 7–52)
ALT SERPL W P-5'-P-CCNC: 15 U/L (ref 7–52)
ANION GAP SERPL CALCULATED.3IONS-SCNC: 8 MMOL/L
ANION GAP SERPL CALCULATED.3IONS-SCNC: 9 MMOL/L
AST SERPL W P-5'-P-CCNC: 13 U/L (ref 13–39)
AST SERPL W P-5'-P-CCNC: 15 U/L (ref 13–39)
BASOPHILS # BLD AUTO: 0.04 THOUSANDS/ÂΜL (ref 0–0.1)
BASOPHILS NFR BLD AUTO: 1 % (ref 0–1)
BILIRUB SERPL-MCNC: 0.6 MG/DL (ref 0.2–1)
BILIRUB SERPL-MCNC: 0.64 MG/DL (ref 0.2–1)
BUN SERPL-MCNC: 34 MG/DL (ref 5–25)
BUN SERPL-MCNC: 34 MG/DL (ref 5–25)
CALCIUM SERPL-MCNC: 10 MG/DL (ref 8.4–10.2)
CALCIUM SERPL-MCNC: 10.1 MG/DL (ref 8.4–10.2)
CHLORIDE SERPL-SCNC: 100 MMOL/L (ref 96–108)
CHLORIDE SERPL-SCNC: 99 MMOL/L (ref 96–108)
CHOLEST SERPL-MCNC: 148 MG/DL
CO2 SERPL-SCNC: 24 MMOL/L (ref 21–32)
CO2 SERPL-SCNC: 26 MMOL/L (ref 21–32)
CREAT SERPL-MCNC: 1.5 MG/DL (ref 0.6–1.3)
CREAT SERPL-MCNC: 1.53 MG/DL (ref 0.6–1.3)
EOSINOPHIL # BLD AUTO: 0.32 THOUSAND/ÂΜL (ref 0–0.61)
EOSINOPHIL NFR BLD AUTO: 6 % (ref 0–6)
ERYTHROCYTE [DISTWIDTH] IN BLOOD BY AUTOMATED COUNT: 12.8 % (ref 11.6–15.1)
GFR SERPL CREATININE-BSD FRML MDRD: 48 ML/MIN/1.73SQ M
GFR SERPL CREATININE-BSD FRML MDRD: 49 ML/MIN/1.73SQ M
GLUCOSE P FAST SERPL-MCNC: 134 MG/DL (ref 65–99)
GLUCOSE SERPL-MCNC: 138 MG/DL (ref 65–140)
HCT VFR BLD AUTO: 44.3 % (ref 36.5–49.3)
HDLC SERPL-MCNC: 38 MG/DL
HGB BLD-MCNC: 15.1 G/DL (ref 12–17)
IMM GRANULOCYTES # BLD AUTO: 0.01 THOUSAND/UL (ref 0–0.2)
IMM GRANULOCYTES NFR BLD AUTO: 0 % (ref 0–2)
LDLC SERPL CALC-MCNC: 81 MG/DL (ref 0–100)
LIPASE SERPL-CCNC: 56 U/L (ref 11–82)
LYMPHOCYTES # BLD AUTO: 1.2 THOUSANDS/ÂΜL (ref 0.6–4.47)
LYMPHOCYTES NFR BLD AUTO: 23 % (ref 14–44)
MCH RBC QN AUTO: 30.8 PG (ref 26.8–34.3)
MCHC RBC AUTO-ENTMCNC: 34.1 G/DL (ref 31.4–37.4)
MCV RBC AUTO: 90 FL (ref 82–98)
MONOCYTES # BLD AUTO: 0.5 THOUSAND/ÂΜL (ref 0.17–1.22)
MONOCYTES NFR BLD AUTO: 10 % (ref 4–12)
NEUTROPHILS # BLD AUTO: 3.09 THOUSANDS/ÂΜL (ref 1.85–7.62)
NEUTS SEG NFR BLD AUTO: 60 % (ref 43–75)
NRBC BLD AUTO-RTO: 0 /100 WBCS
PLATELET # BLD AUTO: 229 THOUSANDS/UL (ref 149–390)
PMV BLD AUTO: 10.4 FL (ref 8.9–12.7)
POTASSIUM SERPL-SCNC: 4.6 MMOL/L (ref 3.5–5.3)
POTASSIUM SERPL-SCNC: 4.7 MMOL/L (ref 3.5–5.3)
PROT SERPL-MCNC: 7.9 G/DL (ref 6.4–8.4)
PROT SERPL-MCNC: 8 G/DL (ref 6.4–8.4)
RBC # BLD AUTO: 4.9 MILLION/UL (ref 3.88–5.62)
SODIUM SERPL-SCNC: 133 MMOL/L (ref 135–147)
SODIUM SERPL-SCNC: 133 MMOL/L (ref 135–147)
TRIGL SERPL-MCNC: 144 MG/DL
WBC # BLD AUTO: 5.16 THOUSAND/UL (ref 4.31–10.16)

## 2024-03-06 PROCEDURE — 36415 COLL VENOUS BLD VENIPUNCTURE: CPT

## 2024-03-06 PROCEDURE — 83036 HEMOGLOBIN GLYCOSYLATED A1C: CPT

## 2024-03-06 PROCEDURE — 85025 COMPLETE CBC W/AUTO DIFF WBC: CPT | Performed by: EMERGENCY MEDICINE

## 2024-03-06 PROCEDURE — 99284 EMERGENCY DEPT VISIT MOD MDM: CPT

## 2024-03-06 PROCEDURE — 80053 COMPREHEN METABOLIC PANEL: CPT | Performed by: EMERGENCY MEDICINE

## 2024-03-06 PROCEDURE — 83690 ASSAY OF LIPASE: CPT | Performed by: EMERGENCY MEDICINE

## 2024-03-06 PROCEDURE — 96375 TX/PRO/DX INJ NEW DRUG ADDON: CPT

## 2024-03-06 PROCEDURE — 99285 EMERGENCY DEPT VISIT HI MDM: CPT | Performed by: EMERGENCY MEDICINE

## 2024-03-06 PROCEDURE — 74176 CT ABD & PELVIS W/O CONTRAST: CPT

## 2024-03-06 PROCEDURE — 80061 LIPID PANEL: CPT

## 2024-03-06 PROCEDURE — 96374 THER/PROPH/DIAG INJ IV PUSH: CPT

## 2024-03-06 PROCEDURE — 96361 HYDRATE IV INFUSION ADD-ON: CPT

## 2024-03-06 RX ORDER — IBUPROFEN 800 MG/1
800 TABLET ORAL EVERY 6 HOURS PRN
Qty: 30 TABLET | Refills: 0 | Status: SHIPPED | OUTPATIENT
Start: 2024-03-06 | End: 2024-03-06

## 2024-03-06 RX ORDER — METHOCARBAMOL 750 MG/1
1500 TABLET, FILM COATED ORAL EVERY 6 HOURS PRN
Qty: 30 TABLET | Refills: 0 | Status: SHIPPED | OUTPATIENT
Start: 2024-03-06 | End: 2024-03-13 | Stop reason: SDUPTHER

## 2024-03-06 RX ORDER — IBUPROFEN 800 MG/1
800 TABLET ORAL EVERY 6 HOURS PRN
Qty: 30 TABLET | Refills: 0 | Status: SHIPPED | OUTPATIENT
Start: 2024-03-06 | End: 2024-03-12

## 2024-03-06 RX ORDER — KETOROLAC TROMETHAMINE 30 MG/ML
15 INJECTION, SOLUTION INTRAMUSCULAR; INTRAVENOUS ONCE
Status: COMPLETED | OUTPATIENT
Start: 2024-03-06 | End: 2024-03-06

## 2024-03-06 RX ORDER — SODIUM CHLORIDE 9 MG/ML
150 INJECTION, SOLUTION INTRAVENOUS CONTINUOUS
Status: DISCONTINUED | OUTPATIENT
Start: 2024-03-06 | End: 2024-03-06 | Stop reason: HOSPADM

## 2024-03-06 RX ORDER — MORPHINE SULFATE 4 MG/ML
4 INJECTION, SOLUTION INTRAMUSCULAR; INTRAVENOUS ONCE
Status: COMPLETED | OUTPATIENT
Start: 2024-03-06 | End: 2024-03-06

## 2024-03-06 RX ORDER — METHOCARBAMOL 750 MG/1
1500 TABLET, FILM COATED ORAL EVERY 6 HOURS PRN
Qty: 30 TABLET | Refills: 0 | Status: SHIPPED | OUTPATIENT
Start: 2024-03-06 | End: 2024-03-06

## 2024-03-06 RX ADMIN — MORPHINE SULFATE 4 MG: 4 INJECTION INTRAVENOUS at 18:03

## 2024-03-06 RX ADMIN — KETOROLAC TROMETHAMINE 15 MG: 30 INJECTION, SOLUTION INTRAMUSCULAR; INTRAVENOUS at 18:04

## 2024-03-06 RX ADMIN — SODIUM CHLORIDE 150 ML/HR: 0.9 INJECTION, SOLUTION INTRAVENOUS at 17:59

## 2024-03-06 NOTE — ED PROVIDER NOTES
History  Chief Complaint   Patient presents with    Flank Pain     Pt seen here on the 1st and diagnosed with kidney stone. Pt returned to eval d/t worsening pain on right side and reports chills, n/v.      Patient is a 59-year-old male.  He does have prior history of kidney stones.  He has been experiencing about 2 weeks of left flank pain.  He does have a history of a large right intrarenal stone with staghorn calculus into the renal pelvis.  There is no nephrosis.  The left flank pain has been atraumatic.  No nausea or vomiting.  Patient does report fever but is unclear how high it was.  He has no urinary complaints.  No constipation or diarrhea.  No chest pain or trouble breathing.  Patient reports that the pain feels similar to prior kidney stone pain.        Prior to Admission Medications   Prescriptions Last Dose Informant Patient Reported? Taking?   Accu-Chek FastClix Lancets MISC  Self No No   Sig: Use to test blood sugar once a day   Alpha-Lipoic Acid 600 MG CAPS  Self Yes No   Sig: Take 600 mg by mouth 2 (two) times a day     Apple Cider Vinegar 300 MG TABS  Self Yes No   Sig: Take 300 mg by mouth daily     Ascorbic Acid (vitamin C) 1000 MG tablet  Self Yes No   Sig: Take 1,000 mg by mouth 2 (two) times a day 2 tablet twice a day   BENFOTIAMINE PO  Self Yes No   Blood Glucose Monitoring Suppl (Accu-Chek Guide Me) w/Device KIT  Self No No   Sig: Use to check blood sugar once a day   Chromium Picolinate 200 MCG TABS  Self Yes No   Sig: Take by mouth   Empagliflozin (Jardiance) 25 MG TABS  Self No No   Sig: Take 1 tablet (25 mg total) by mouth daily   Patient taking differently: Take 25 mg by mouth daily at bedtime   Garlic 1200 MG CAPS  Self Yes No   Sig: Take by mouth 2 (two) times a day    IRON, FERROUS SULFATE, PO  Self Yes No   Sig: Take by mouth   Lutein-Bilberry (LUTEIN PLUS BILBERRY PO)  Self Yes No   Sig: Take by mouth   Ozempic, 0.25 or 0.5 MG/DOSE, 2 MG/3ML injection pen   Yes No   Pyridoxine HCl  (B-6 PO)  Self Yes No   Sig: Take by mouth   Turmeric (QC TUMERIC COMPLEX PO)  Self Yes No   Sig: Take 1,000 mg by mouth once Tumeric /curcimin   VITAMIN D PO  Self Yes No   Sig: Take by mouth 2 (two) times a day   Zinc 50 MG CAPS  Self Yes No   Sig: Take by mouth 2 (two) times a day    ammonium lactate (LAC-HYDRIN) 12 % lotion   Yes No   beta carotene 30 MG capsule  Self Yes No   Sig: Take 30 mg by mouth 2 (two) times a day     carvedilol (COREG) 25 mg tablet  Self No No   Sig: Take 1 tablet (25 mg total) by mouth 2 (two) times a day with meals   glucose blood (Accu-Chek Guide) test strip  Self No No   Sig: Use to check blood sugar once a day   ketoconazole (NIZORAL) 2 % cream   Yes No   ketorolac (ACULAR) 0.5 % ophthalmic solution  Self Yes No   lidocaine (Lidoderm) 5 %   No No   Sig: Apply 1 patch topically over 12 hours daily Remove & Discard patch within 12 hours or as directed by MD   lisinopril-hydrochlorothiazide (PRINZIDE,ZESTORETIC) 20-12.5 MG per tablet  Self No No   Sig: Take 1 tablet by mouth 2 (two) times a day   semaglutide, 0.25 or 0.5 mg/dose, (Ozempic, 0.25 or 0.5 MG/DOSE,) 2 mg/1.5 mL injection pen  Self No No   Si.5 mg weekly   Patient taking differently: Inject 0.5 mg under the skin every 7 days 0.5 mg weekly   traMADol (Ultram) 50 mg tablet   No No   Si tablets in evening as needed for severe pain relief   vitamin B-12 (CYANOCOBALAMIN) 100 MCG TABS  Self Yes No   Sig: Take 50 mcg by mouth daily   vitamin E, tocopherol, 400 units capsule  Self Yes No   Sig: Take 400 Units by mouth 2 (two) times a day      Facility-Administered Medications: None       Past Medical History:   Diagnosis Date    Chronic kidney disease 2012    Chronic pain disorder     Diabetes mellitus (HCC)     H/O eye surgery     High blood sugar     Hypertension     Liver disease        Past Surgical History:   Procedure Laterality Date    HERNIA REPAIR      INCISION AND DRAINAGE  2024    KIDNEY SURGERY      KNEE  SURGERY  1980    MOUTH SURGERY      OR AMPUTATION METATARSAL W/TOE SINGLE Left 6/1/2022    Procedure: RAY RESECTION FOOT;  Surgeon: Hannah Melgoza DPM;  Location: [facility];  Service: Podiatry    OR RPR AA HERNIA 1ST < 3 CM REDUCIBLE N/A 7/14/2023    Procedure: REPAIR HERNIA INCISIONAL;  Surgeon: Matt Melo MD;  [facility];  Service: General    WOUND DEBRIDEMENT Left 4/28/2022    Procedure: Left foot washout;  Surgeon: Hannah Melgoza DPM;  Location: [facility];  Service: Podiatry    WOUND DEBRIDEMENT Left 6/6/2022    Procedure: DEBRIDEMENT WOUND (WASH OUT);  Surgeon: Hannah Melgoza DPM;  Location: [facility];  Service: Podiatry       Family History   Problem Relation Age of Onset    Diabetes Paternal Grandfather     Diabetes Paternal Grandmother      I have reviewed and agree with the history as documented.    E-Cigarette/Vaping    E-Cigarette Use Never User      E-Cigarette/Vaping Substances    Nicotine No     THC No     CBD No     Flavoring No     Other No     Unknown No      Social History     Tobacco Use    Smoking status: Never    Smokeless tobacco: Never   Vaping Use    Vaping status: Never Used   Substance Use Topics    Alcohol use: Yes     Alcohol/week: 1.0 standard drink of alcohol     Types: 1 Cans of beer per week     Comment: ocassional    Drug use: Never       Review of Systems   Constitutional:  Negative for chills and fever.   HENT:  Negative for rhinorrhea and sore throat.    Eyes:  Negative for pain, redness and visual disturbance.   Respiratory:  Negative for cough and shortness of breath.    Cardiovascular:  Negative for chest pain and leg swelling.   Gastrointestinal:  Negative for abdominal pain, diarrhea and vomiting.   Endocrine: Negative for polydipsia and polyuria.   Genitourinary:  Positive for flank pain. Negative for dysuria, frequency and hematuria.   Musculoskeletal:  Negative for back pain and neck pain.   Skin:  Negative for rash and wound.    Allergic/Immunologic: Negative for immunocompromised state.   Neurological:  Negative for weakness, numbness and headaches.   Psychiatric/Behavioral:  Negative for hallucinations and suicidal ideas.    All other systems reviewed and are negative.      Physical Exam  Physical Exam  Vitals reviewed.   Constitutional:       General: He is not in acute distress.     Appearance: He is not toxic-appearing.   HENT:      Head: Normocephalic and atraumatic.      Nose: Nose normal.      Mouth/Throat:      Mouth: Mucous membranes are moist.   Eyes:      General:         Right eye: No discharge.         Left eye: No discharge.      Conjunctiva/sclera: Conjunctivae normal.   Cardiovascular:      Rate and Rhythm: Normal rate and regular rhythm.      Pulses: Normal pulses.      Heart sounds: Normal heart sounds. No murmur heard.     No friction rub. No gallop.   Pulmonary:      Effort: Pulmonary effort is normal. No respiratory distress.      Breath sounds: Normal breath sounds. No stridor. No wheezing, rhonchi or rales.   Abdominal:      General: Bowel sounds are normal. There is no distension.      Palpations: Abdomen is soft.      Tenderness: There is no abdominal tenderness. There is left CVA tenderness. There is no right CVA tenderness, guarding or rebound.   Musculoskeletal:         General: No swelling, tenderness, deformity or signs of injury. Normal range of motion.      Cervical back: Normal range of motion and neck supple. No rigidity.      Right lower leg: No edema.      Left lower leg: No edema.      Comments: No calf pain or unilateral leg swelling   Skin:     General: Skin is warm and dry.      Coloration: Skin is not jaundiced or pale.      Findings: No bruising, erythema or rash.   Neurological:      General: No focal deficit present.      Mental Status: He is alert and oriented to person, place, and time.      Cranial Nerves: No facial asymmetry.      Sensory: No sensory deficit.      Motor: Motor function is  intact.   Psychiatric:         Mood and Affect: Mood normal.         Behavior: Behavior normal.         Vital Signs  ED Triage Vitals [03/06/24 1632]   Temperature Pulse Respirations Blood Pressure SpO2   98.8 °F (37.1 °C) 87 16 156/86 97 %      Temp Source Heart Rate Source Patient Position - Orthostatic VS BP Location FiO2 (%)   Oral Monitor Sitting Left arm --      Pain Score       9           Vitals:    03/06/24 1632   BP: 156/86   Pulse: 87   Patient Position - Orthostatic VS: Sitting         Visual Acuity      ED Medications  Medications   sodium chloride 0.9 % infusion (150 mL/hr Intravenous New Bag 3/6/24 1759)   morphine injection 4 mg (4 mg Intravenous Given 3/6/24 1803)   ketorolac (TORADOL) injection 15 mg (15 mg Intravenous Given 3/6/24 1804)       Diagnostic Studies  Results Reviewed       Procedure Component Value Units Date/Time    Comprehensive metabolic panel [[id number]]  (Abnormal) Collected: 03/06/24 1802    Lab Status: Final result Specimen: Blood from Arm, Left Updated: 03/06/24 1831     Sodium 133 mmol/L      Potassium 4.7 mmol/L      Chloride 100 mmol/L      CO2 24 mmol/L      ANION GAP 9 mmol/L      BUN 34 mg/dL      Creatinine 1.50 mg/dL      Glucose 138 mg/dL      Calcium 10.1 mg/dL      AST 15 U/L      ALT 15 U/L      Alkaline Phosphatase 45 U/L      Total Protein 8.0 g/dL      Albumin 4.5 g/dL      Total Bilirubin 0.64 mg/dL      eGFR 49 ml/min/1.73sq m     Narrative:      [employer] guidelines for Chronic Kidney Disease (CKD):     Stage 1 with normal or high GFR (GFR > 90 mL/min/1.73 square meters)    Stage 2 Mild CKD (GFR = 60-89 mL/min/1.73 square meters)    Stage 3A Moderate CKD (GFR = 45-59 mL/min/1.73 square meters)    Stage 3B Moderate CKD (GFR = 30-44 mL/min/1.73 square meters)    Stage 4 Severe CKD (GFR = 15-29 mL/min/1.73 square meters)    Stage 5 End Stage CKD (GFR <15 mL/min/1.73 square meters)  Note: GFR calculation is accurate only with a steady  state creatinine    Lipase [[id number]]  (Normal) Collected: 03/06/24 1802    Lab Status: Final result Specimen: Blood from Arm, Left Updated: 03/06/24 1831     Lipase 56 u/L     CBC and differential [[id number]] Collected: 03/06/24 1802    Lab Status: Final result Specimen: Blood from Arm, Left Updated: 03/06/24 1813     WBC 5.16 Thousand/uL      RBC 4.90 Million/uL      Hemoglobin 15.1 g/dL      Hematocrit 44.3 %      MCV 90 fL      MCH 30.8 pg      MCHC 34.1 g/dL      RDW 12.8 %      MPV 10.4 fL      Platelets 229 Thousands/uL      nRBC 0 /100 WBCs      Neutrophils Relative 60 %      Immat GRANS % 0 %      Lymphocytes Relative 23 %      Monocytes Relative 10 %      Eosinophils Relative 6 %      Basophils Relative 1 %      Neutrophils Absolute 3.09 Thousands/µL      Immature Grans Absolute 0.01 Thousand/uL      Lymphocytes Absolute 1.20 Thousands/µL      Monocytes Absolute 0.50 Thousand/µL      Eosinophils Absolute 0.32 Thousand/µL      Basophils Absolute 0.04 Thousands/µL     UA w Reflex to Microscopic w Reflex to Culture [[id number]]     Lab Status: No result Specimen: Urine                    CT renal stone study abdomen pelvis without contrast   Final Result by Kyle Diaz MD (03/06 1854)      Stable multiple nonobstructing right renal calculi, including the 2.8 cm staghorn calculus, without hydroureteronephrosis. No left renal calculi or hydronephrosis.      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         Workstation performed: [id number]                    Procedures  Procedures         ED Course                                             Medical Decision Making  Patient did have a kidney stone, but on the right side.  Furthermore it is intrarenal.  It is unlikely to be the cause of the pain.  There is no left-sided obstructive uropathy. Although patient did not provide a urine, doubt pyelonephritis.  There is no CT  evidence of pyelonephritis.  No AAA.  No bowel obstruction.  No pancreatitis.  No diverticulitis.  This might be musculoskeletal back pain.  Will treat for that.  Will outpatient follow-up with his primary MD for further evaluation and treatment.    Amount and/or Complexity of Data Reviewed  Labs: ordered. Decision-making details documented in ED Course.  Radiology: ordered. Decision-making details documented in ED Course.  ECG/medicine tests: ordered and independent interpretation performed. Decision-making details documented in ED Course.    Risk  Prescription drug management.  Decision regarding hospitalization.             Disposition  Final diagnoses:   Low back pain     Time reflects when diagnosis was documented in both MDM as applicable and the Disposition within this note       Time User Action Codes Description Comment    3/6/2024  7:38 PM Conner Maciel [M54.50] Low back pain           ED Disposition       ED Disposition   Discharge    Condition   Stable    Date/Time   Wed Mar 6, 2024  7:38 PM    Comment   Yoni Castillo discharge to home/self care.                   Follow-up Information       Follow up With Specialties Details Why Contact Info Additional Information    Judd Ji MD Internal Medicine In 1 week  [address] [place] [address]  [address], [address]  [facility] [address]  [phone]       [facility]   [phone]            Patient's Medications   Discharge Prescriptions    IBUPROFEN (MOTRIN) 800 MG TABLET    Take 1 tablet (800 mg total) by mouth every 6 (six) hours as needed (pain)       Start Date: 3/6/2024  End Date: --       Order Dose: 800 mg       Quantity: 30 tablet    Refills: 0    METHOCARBAMOL (ROBAXIN) 750 MG TABLET    Take 2 tablets (1,500 mg total) by mouth every 6 (six) hours as needed (Back pain)       Start Date: 3/6/2024  End Date: --       Order Dose: 1,500 mg       Quantity: 30 tablet    Refills: 0           PDMP  Review         Value Time User    PDMP Reviewed  Yes 2/20/2024  7:14 AM Judd Ji MD            ED Provider  Electronically Signed by             Conner Maciel MD  03/06/24 1940       Conner Maciel MD  03/06/24 1940

## 2024-03-07 ENCOUNTER — TELEPHONE (OUTPATIENT)
Dept: PHYSICAL THERAPY | Facility: OTHER | Age: 59
End: 2024-03-07

## 2024-03-07 ENCOUNTER — OFFICE VISIT (OUTPATIENT)
Dept: ENDOCRINOLOGY | Facility: CLINIC | Age: 59
End: 2024-03-07
Payer: MEDICARE

## 2024-03-07 VITALS
SYSTOLIC BLOOD PRESSURE: 138 MMHG | WEIGHT: 272.4 LBS | DIASTOLIC BLOOD PRESSURE: 88 MMHG | OXYGEN SATURATION: 98 % | HEIGHT: 72 IN | HEART RATE: 86 BPM | BODY MASS INDEX: 36.9 KG/M2

## 2024-03-07 DIAGNOSIS — E66.01 CLASS 2 SEVERE OBESITY DUE TO EXCESS CALORIES WITH SERIOUS COMORBIDITY AND BODY MASS INDEX (BMI) OF 35.0 TO 35.9 IN ADULT: ICD-10-CM

## 2024-03-07 DIAGNOSIS — N18.32 STAGE 3B CHRONIC KIDNEY DISEASE (HCC): ICD-10-CM

## 2024-03-07 DIAGNOSIS — Z89.422 ACQUIRED ABSENCE OF OTHER LEFT TOE(S) (HCC): ICD-10-CM

## 2024-03-07 DIAGNOSIS — E78.5 DYSLIPIDEMIA: ICD-10-CM

## 2024-03-07 DIAGNOSIS — I10 ESSENTIAL HYPERTENSION: ICD-10-CM

## 2024-03-07 DIAGNOSIS — E11.22 TYPE 2 DIABETES MELLITUS WITH CHRONIC KIDNEY DISEASE, WITHOUT LONG-TERM CURRENT USE OF INSULIN, UNSPECIFIED CKD STAGE (HCC): Primary | ICD-10-CM

## 2024-03-07 LAB
EST. AVERAGE GLUCOSE BLD GHB EST-MCNC: 163 MG/DL
HBA1C MFR BLD: 7.3 %

## 2024-03-07 PROCEDURE — 99214 OFFICE O/P EST MOD 30 MIN: CPT

## 2024-03-07 NOTE — PROGRESS NOTES
Established Patient Progress Note    CC: Follow up for type 2 diabetes mellitus     Impression & Plan:    Problem List Items Addressed This Visit       Essential hypertension     Slightly elevated in office but has been dealing with pain. He has plans to see nephrology. For now, will continue to monitor. Goal < 130/80         Type 2 diabetes mellitus with chronic kidney disease, without long-term current use of insulin (HCC) - Primary     A1C has worsened from prior. For now, discussed with patient to monitor diet and exercise as tolerated. If no improvement with next A1C, discussed adding medication. He is agreeable.     Will keep Ozempic dose the same for now as he reports decreased appetite from being in pain. Concern for malnutrition discussed with patient. Advise he continue with protein shakes. He should also follow up with PCP regarding ongoing pain.    Note: he does not want insulin    Lab Results   Component Value Date    HGBA1C 7.3 (H) 03/06/2024            Relevant Orders    Hemoglobin A1C    Class 2 severe obesity with serious comorbidity and body mass index (BMI) of 35.0 to 35.9 in adult      Continue Ozempic         Chronic kidney disease, stage 3 (HCC)    Dyslipidemia     Stable. Continue to monitor          Acquired absence of other left toe(s) (HCC)       Orders Placed This Encounter   Procedures    Hemoglobin A1C     Standing Status:   Future     Standing Expiration Date:   3/7/2025       History of Present Illness:     Yoni Castillo is a 59 y.o. male with a history of type 2 diabetes, hypertension, dyslipidemia, CKD. Complications: microalbuminuria. Last A1C was 7.3. No blood sugar log to review.    Recent hospitalizations or illnesses: yes- ongoing flank pain for past 2 weeks. Had 2 ER visits. CT performed found 1 large intrarenal stone in right kidney. Believed to be MSK pain. Testing negative for pancreatitis or AAA  Problems with current regimen: no. A1C has worsened. Patient reports no  changes to lifestyle. He does report reduced appetite since being in pain and has been drinking more protein shakes. He also has CKD. Has not seen nephrology yet but has referral.     Exercise: limited now due to pain     Current regimen:   Ozempic 0.5 mg/week  Jardiance 25 mg daily    ACE/ARB: lisinopril  Has hyperlipidemia: Taking none      Foot exam: 8 weeks ago- sees podiatry  Eye exam: UTD    Note: does not want to start on insulin    Patient Active Problem List   Diagnosis    Essential hypertension    Type 2 diabetes mellitus with chronic kidney disease, without long-term current use of insulin (HCC)    Class 2 severe obesity with serious comorbidity and body mass index (BMI) of 35.0 to 35.9 in adult     Low back pain with sciatica    Lumbar radiculopathy    Cervical radiculopathy    Neuropathy    Erectile dysfunction    Chronic kidney disease, stage 3 (HCC)    Dyslipidemia    H/O diabetic foot ulcer    Continuous opioid dependence (HCC)    Arthritis    Ventral hernia without obstruction or gangrene    Incisional hernia without obstruction or gangrene    Acquired absence of other left toe(s) ([facility])    Dupuytren's disease of finger with nodules without contracture    Cellulitis of hand    Burn    Dog bite      Past Medical History:   Diagnosis Date    Chronic kidney disease 2012    Chronic pain disorder     Diabetes mellitus (HCC)     H/O eye surgery     High blood sugar     Hypertension     Liver disease       Past Surgical History:   Procedure Laterality Date    HERNIA REPAIR      INCISION AND DRAINAGE  01/16/2024    KIDNEY SURGERY      KNEE SURGERY  1980    MOUTH SURGERY      PA AMPUTATION METATARSAL W/TOE SINGLE Left 6/1/2022    Procedure: RAY RESECTION FOOT;  Surgeon: Hannah Melgoza DPM;  Location: [facility];  Service: Podiatry    PA RPR AA HERNIA 1ST < 3 CM REDUCIBLE N/A 7/14/2023    Procedure: REPAIR HERNIA INCISIONAL;  Surgeon: Matt Melo MD;  [facility];  Service: General     WOUND DEBRIDEMENT Left 4/28/2022    Procedure: Left foot washout;  Surgeon: Hannah Melgoza DPM;  [facility];  Service: Podiatry    WOUND DEBRIDEMENT Left 6/6/2022    Procedure: DEBRIDEMENT WOUND (WASH OUT);  Surgeon: Hannah Melgoza DPM;  Location[facility];  Service: Podiatry      Family History   Problem Relation Age of Onset    Diabetes Paternal Grandfather     Diabetes Paternal Grandmother      Social History     Tobacco Use    Smoking status: Never    Smokeless tobacco: Never   Substance Use Topics    Alcohol use: Yes     Alcohol/week: 1.0 standard drink of alcohol     Types: 1 Cans of beer per week     Comment: ocassional     Allergies   Allergen Reactions    Cephalexin Hives     Skin peeling  Tolerates penicillins (tolerated unasyn and zosyn)    Metformin GI Intolerance         Current Outpatient Medications:     Accu-Chek FastClix Lancets MISC, Use to test blood sugar once a day, Disp: 102 each, Rfl: 0    Alpha-Lipoic Acid 600 MG CAPS, Take 600 mg by mouth 2 (two) times a day  , Disp: , Rfl:     ammonium lactate (LAC-HYDRIN) 12 % lotion, , Disp: , Rfl:     Apple Cider Vinegar 300 MG TABS, Take 300 mg by mouth daily  , Disp: , Rfl:     Ascorbic Acid (vitamin C) 1000 MG tablet, Take 1,000 mg by mouth 2 (two) times a day 2 tablet twice a day, Disp: , Rfl:     BENFOTIAMINE PO, , Disp: , Rfl:     beta carotene 30 MG capsule, Take 30 mg by mouth 2 (two) times a day  , Disp: , Rfl:     Blood Glucose Monitoring Suppl (Accu-Chek Guide Me) w/Device KIT, Use to check blood sugar once a day, Disp: 1 kit, Rfl: 0    carvedilol (COREG) 25 mg tablet, Take 1 tablet (25 mg total) by mouth 2 (two) times a day with meals, Disp: 180 tablet, Rfl: 3    Chromium Picolinate 200 MCG TABS, Take by mouth, Disp: , Rfl:     Empagliflozin (Jardiance) 25 MG TABS, Take 1 tablet (25 mg total) by mouth daily (Patient taking differently: Take 25 mg by mouth daily at bedtime), Disp: 90 tablet, Rfl: 3    Garlic 1200 MG CAPS, Take by  mouth 2 (two) times a day , Disp: , Rfl:     glucose blood (Accu-Chek Guide) test strip, Use to check blood sugar once a day, Disp: 100 strip, Rfl: 0    ibuprofen (MOTRIN) 800 mg tablet, Take 1 tablet (800 mg total) by mouth every 6 (six) hours as needed (pain), Disp: 30 tablet, Rfl: 0    IRON, FERROUS SULFATE, PO, Take by mouth, Disp: , Rfl:     ketoconazole (NIZORAL) 2 % cream, , Disp: , Rfl:     ketorolac (ACULAR) 0.5 % ophthalmic solution, , Disp: , Rfl:     lidocaine (Lidoderm) 5 %, Apply 1 patch topically over 12 hours daily Remove & Discard patch within 12 hours or as directed by MD, Disp: 30 patch, Rfl: 4    lisinopril-hydrochlorothiazide (PRINZIDE,ZESTORETIC) 20-12.5 MG per tablet, Take 1 tablet by mouth 2 (two) times a day, Disp: 180 tablet, Rfl: 3    Lutein-Bilberry (LUTEIN PLUS BILBERRY PO), Take by mouth, Disp: , Rfl:     methocarbamol (ROBAXIN) 750 mg tablet, Take 2 tablets (1,500 mg total) by mouth every 6 (six) hours as needed (Back pain), Disp: 30 tablet, Rfl: 0    Ozempic, 0.25 or 0.5 MG/DOSE, 2 MG/3ML injection pen, , Disp: , Rfl:     Pyridoxine HCl (B-6 PO), Take by mouth, Disp: , Rfl:     semaglutide, 0.25 or 0.5 mg/dose, (Ozempic, 0.25 or 0.5 MG/DOSE,) 2 mg/1.5 mL injection pen, 0.5 mg weekly (Patient taking differently: Inject 0.5 mg under the skin every 7 days 0.5 mg weekly), Disp: 1.5 mL, Rfl: 3    traMADol (Ultram) 50 mg tablet, 2 tablets in evening as needed for severe pain relief, Disp: 60 tablet, Rfl: 0    Turmeric (QC TUMERIC COMPLEX PO), Take 1,000 mg by mouth once Tumeric /curcimin, Disp: , Rfl:     vitamin B-12 (CYANOCOBALAMIN) 100 MCG TABS, Take 50 mcg by mouth daily, Disp: , Rfl:     VITAMIN D PO, Take by mouth 2 (two) times a day, Disp: , Rfl:     vitamin E, tocopherol, 400 units capsule, Take 400 Units by mouth 2 (two) times a day, Disp: , Rfl:     Zinc 50 MG CAPS, Take by mouth 2 (two) times a day , Disp: , Rfl:   No current facility-administered medications for this  visit.    Review of Systems   Constitutional:  Negative for chills and fever.   HENT:  Negative for ear pain and sore throat.    Eyes:  Negative for pain and visual disturbance.   Respiratory:  Negative for cough and shortness of breath.    Cardiovascular:  Negative for chest pain and palpitations.   Gastrointestinal:  Negative for abdominal pain and vomiting.   Genitourinary:  Negative for dysuria and hematuria.   Musculoskeletal:  Negative for arthralgias and back pain.   Skin:  Negative for color change and rash.   Neurological:  Negative for seizures and syncope.   All other systems reviewed and are negative.      Physical Exam:  Body mass index is 36.94 kg/m².  /88 (BP Location: Left arm, Patient Position: Sitting, Cuff Size: Adult)   Pulse 86   Ht 6' (1.829 m)   Wt 124 kg (272 lb 6.4 oz)   SpO2 98%   BMI 36.94 kg/m²    Wt Readings from Last 3 Encounters:   03/07/24 124 kg (272 lb 6.4 oz)   03/06/24 123 kg (270 lb 11.6 oz)   03/01/24 122 kg (269 lb 10 oz)       Physical Exam  Vitals reviewed.   Constitutional:       Appearance: Normal appearance.   HENT:      Head: Normocephalic and atraumatic.   Cardiovascular:      Rate and Rhythm: Normal rate.   Pulmonary:      Effort: Pulmonary effort is normal.   Neurological:      Mental Status: He is alert and oriented to person, place, and time.   Psychiatric:         Mood and Affect: Mood normal.         Behavior: Behavior normal.       Diabetic Foot Exam    Labs:   Lab Results   Component Value Date    HGBA1C 7.3 (H) 03/06/2024    HGBA1C 6.7 (H) 11/28/2023    HGBA1C 6.2 11/07/2023     Lab Results   Component Value Date    CREATININE 1.50 (H) 03/06/2024    CREATININE 1.53 (H) 03/06/2024    CREATININE 1.66 (H) 03/01/2024    BUN 34 (H) 03/06/2024    K 4.7 03/06/2024     03/06/2024    CO2 24 03/06/2024     eGFR   Date Value Ref Range Status   03/06/2024 49 ml/min/1.73sq m Final     Lab Results   Component Value Date    HDL 38 (L) 03/06/2024    TRIG 144  03/06/2024     Lab Results   Component Value Date    ALT 15 03/06/2024    AST 15 03/06/2024    ALKPHOS 45 03/06/2024     Lab Results   Component Value Date    GHT2UUTVNYMS 0.458 07/02/2023    MAT0PLTBCMXC 1.227 06/16/2021     Lab Results   Component Value Date    FREET4 0.85 06/16/2021         There are no Patient Instructions on file for this visit.      Discussed with the patient and all questioned fully answered. He will call me if any problems arise.

## 2024-03-07 NOTE — ASSESSMENT & PLAN NOTE
A1C has worsened from prior. For now, discussed with patient to monitor diet and exercise as tolerated. If no improvement with next A1C, discussed adding medication. He is agreeable.     Will keep Ozempic dose the same for now as he reports decreased appetite from being in pain. Concern for malnutrition discussed with patient. Advise he continue with protein shakes. He should also follow up with PCP regarding ongoing pain.    Note: he does not want insulin    Lab Results   Component Value Date    HGBA1C 7.3 (H) 03/06/2024

## 2024-03-07 NOTE — ASSESSMENT & PLAN NOTE
Slightly elevated in office but has been dealing with pain. He has plans to see nephrology. For now, will continue to monitor. Goal < 130/80

## 2024-03-08 NOTE — TELEPHONE ENCOUNTER
"Patient called into [facility] today and left v/m @1:13pm.    Returned patient's call @1:45pm.    Spoke with patient, explained [facility] and reason for the call. I also explained the protocol and what we offer.    Patient declined services . Patient states he has done PT 5 times and does not help. I also offer PM for possible injections , he also declined.    I suggested the patient to f/up with his PCP. Patient got upset and proceed to say \"why is always one or two things with your guys and not the answer to tell me what is going on with my back\".    I proceed to say we can offer the eval and treatment but he declined. His other option will be to speak with his PCP and discuss further about his pain.    Patient states \"I'm not going to call you people back never again\". And hung up.    Will close referral per protocol.  "

## 2024-03-12 ENCOUNTER — OFFICE VISIT (OUTPATIENT)
Dept: INTERNAL MEDICINE CLINIC | Facility: CLINIC | Age: 59
End: 2024-03-12
Payer: MEDICARE

## 2024-03-12 ENCOUNTER — HOSPITAL ENCOUNTER (OUTPATIENT)
Dept: RADIOLOGY | Facility: HOSPITAL | Age: 59
Discharge: HOME | End: 2024-03-12
Payer: MEDICARE

## 2024-03-12 VITALS — OXYGEN SATURATION: 99 % | TEMPERATURE: 97.2 F

## 2024-03-12 DIAGNOSIS — M54.42 ACUTE LEFT-SIDED LOW BACK PAIN WITH LEFT-SIDED SCIATICA: ICD-10-CM

## 2024-03-12 DIAGNOSIS — E11.22 TYPE 2 DIABETES MELLITUS WITH CHRONIC KIDNEY DISEASE, WITHOUT LONG-TERM CURRENT USE OF INSULIN, UNSPECIFIED CKD STAGE (HCC): ICD-10-CM

## 2024-03-12 DIAGNOSIS — M54.42 ACUTE LEFT-SIDED LOW BACK PAIN WITH LEFT-SIDED SCIATICA: Primary | ICD-10-CM

## 2024-03-12 PROBLEM — Z86.31 H/O DIABETIC FOOT ULCER: Status: RESOLVED | Noted: 2022-09-09 | Resolved: 2024-03-12

## 2024-03-12 PROBLEM — T30.0 BURN: Status: RESOLVED | Noted: 2024-02-01 | Resolved: 2024-03-12

## 2024-03-12 PROBLEM — N20.0 STAGHORN CALCULUS: Status: ACTIVE | Noted: 2024-03-12

## 2024-03-12 PROBLEM — L03.119 CELLULITIS OF HAND: Status: RESOLVED | Noted: 2024-01-15 | Resolved: 2024-03-12

## 2024-03-12 PROBLEM — F11.20 CONTINUOUS OPIOID DEPENDENCE (HCC): Status: RESOLVED | Noted: 2022-10-25 | Resolved: 2024-03-12
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
## 2024-03-12 PROBLEM — M54.16 LUMBAR RADICULOPATHY: Status: RESOLVED | Noted: 2021-03-26 | Resolved: 2024-03-12

## 2024-03-12 PROBLEM — N52.9 ERECTILE DYSFUNCTION: Status: RESOLVED | Noted: 2021-11-02 | Resolved: 2024-03-12

## 2024-03-12 PROCEDURE — 72110 X-RAY EXAM L-2 SPINE 4/>VWS: CPT

## 2024-03-12 PROCEDURE — G2211 COMPLEX E/M VISIT ADD ON: HCPCS | Performed by: INTERNAL MEDICINE

## 2024-03-12 PROCEDURE — 99213 OFFICE O/P EST LOW 20 MIN: CPT | Performed by: INTERNAL MEDICINE

## 2024-03-12 RX ORDER — METHYLPREDNISOLONE 4 MG/1
TABLET ORAL
Qty: 21 EACH | Refills: 0 | Status: SHIPPED | OUTPATIENT
Start: 2024-03-12 | End: 2024-03-12 | Stop reason: SDUPTHER

## 2024-03-12 RX ORDER — METHYLPREDNISOLONE 4 MG/1
TABLET ORAL
Qty: 21 EACH | Refills: 0 | Status: SHIPPED | OUTPATIENT
Start: 2024-03-12
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
## 2024-03-13 DIAGNOSIS — M54.50 LOW BACK PAIN: ICD-10-CM

## 2024-03-13 RX ORDER — METHOCARBAMOL 750 MG/1
1500 TABLET, FILM COATED ORAL EVERY 8 HOURS SCHEDULED
Qty: 30 TABLET | Refills: 0 | Status: SHIPPED | OUTPATIENT
Start: 2024-03-13 | End: 2024-03-13

## 2024-03-13 RX ORDER — METHOCARBAMOL 750 MG/1
1500 TABLET, FILM COATED ORAL EVERY 8 HOURS SCHEDULED
Qty: 30 TABLET | Refills: 0 | Status: SHIPPED | OUTPATIENT
Start: 2024-03-13 | End: 2024-03-18 | Stop reason: SDUPTHER

## 2024-03-13 NOTE — TELEPHONE ENCOUNTER
Patient saw Dr. Rinaldi yesterday for his back pain.  Dr. Rinaldi sent a prescription in for steroids.  I will renew his muscle relaxer prescription He already has a Rx for Tramadol pain medication I do not see much else that we can do it may take several days for his pain to improve.

## 2024-03-13 NOTE — RESULT ENCOUNTER NOTE
Please call Ed. his spine x-rays show arthritis but no compression fracture or other new problem.  Please ask Ed to call and leave a message on Friday about how he is feeling on the Medrol Dosepak.

## 2024-03-13 NOTE — TELEPHONE ENCOUNTER
Ha Ji,   Patient called and asked if medication could be prescribed for the back pain besides the muscle relaxers. Thank you.

## 2024-03-15 ENCOUNTER — TELEPHONE (OUTPATIENT)
Dept: INTERNAL MEDICINE CLINIC | Facility: CLINIC | Age: 59
End: 2024-03-15

## 2024-03-15 ENCOUNTER — OFFICE VISIT (OUTPATIENT)
Dept: OBGYN CLINIC | Facility: CLINIC | Age: 59
End: 2024-03-15
Payer: MEDICARE

## 2024-03-15 VITALS
WEIGHT: 272 LBS | SYSTOLIC BLOOD PRESSURE: 142 MMHG | DIASTOLIC BLOOD PRESSURE: 78 MMHG | HEIGHT: 72 IN | BODY MASS INDEX: 36.84 KG/M2

## 2024-03-15 DIAGNOSIS — M67.40 MUCOID CYST OF JOINT: Primary | ICD-10-CM

## 2024-03-15 PROCEDURE — 99214 OFFICE O/P EST MOD 30 MIN: CPT | Performed by: ORTHOPAEDIC SURGERY

## 2024-03-15 RX ORDER — CHLORHEXIDINE GLUCONATE 4 G/100ML
SOLUTION TOPICAL DAILY PRN
OUTPATIENT
Start: 2024-03-15

## 2024-03-15 RX ORDER — CHLORHEXIDINE GLUCONATE ORAL RINSE 1.2 MG/ML
15 SOLUTION DENTAL ONCE
OUTPATIENT
Start: 2024-03-25 | End: 2024-03-15
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
## 2024-03-15 NOTE — PROGRESS NOTES
HAND & UPPER EXTREMITY OFFICE VISIT   Referred By:  No referring provider defined for this encounter.      Chief Complaint:     Left index DIP arthritis   Right index DIP, long DIP and ring PIP arthritis    Previous History:   Left hand dog bite with deep infection 1/16/24 s/p IV antibiotics and bedside I&D (healed).  No recurrence of infection off of antibiotics.  Doing well.    Interval History:  Presents today to discuss the prominences of his left index DIP as well as right index DIP. Long PIP and ring PIP joints.  He states that they have been present for years and are slowing enlarging.  His left index DIP bothers him the most especially when working on projects and when he bumps the left index finger. He has no radiation of the pain and no numbness or tingling. The patient has an A1C of 7.3. He would like to undergo excision of the left index DIP prominence.    Past Medical History:  Past Medical History:   Diagnosis Date    Chronic kidney disease 2012    Chronic pain disorder     Diabetes mellitus (HCC)     H/O eye surgery     High blood sugar     Hypertension     Liver disease      Past Surgical History:   Procedure Laterality Date    HERNIA REPAIR      INCISION AND DRAINAGE  01/16/2024    KIDNEY SURGERY      KNEE SURGERY  1980    MOUTH SURGERY      MT AMPUTATION METATARSAL W/TOE SINGLE Left 6/1/2022    Procedure: RAY RESECTION FOOT;  Surgeon: Hannah Melgoza DPM;  [facility];  Service: Podiatry    MT RPR AA HERNIA 1ST < 3 CM REDUCIBLE N/A 7/14/2023    Procedure: REPAIR HERNIA INCISIONAL;  Surgeon: Matt Melo MD;  Location: [facility];  Service: General    WOUND DEBRIDEMENT Left 4/28/2022    Procedure: Left foot washout;  Surgeon: Hannah Melgoza DPM;  [facility];  Service: Podiatry    WOUND DEBRIDEMENT Left 6/6/2022    Procedure: DEBRIDEMENT WOUND (WASH OUT);  Surgeon: Hannah Melgoza DPM;  [facility];  Service: Podiatry     Family History   Problem Relation Age  of Onset    Diabetes Paternal Grandfather     Diabetes Paternal Grandmother      Social History     Socioeconomic History    Marital status: Registered Domestic Partner     Spouse name: Not on file    Number of children: Not on file    Years of education: Not on file    Highest education level: Not on file   Occupational History    Not on file   Tobacco Use    Smoking status: Never    Smokeless tobacco: Never   Vaping Use    Vaping status: Never Used   Substance and Sexual Activity    Alcohol use: Yes     Alcohol/week: 1.0 standard drink of alcohol     Types: 1 Cans of beer per week     Comment: ocassional    Drug use: Never    Sexual activity: Not Currently     Partners: Female     Birth control/protection: Abstinence   Other Topics Concern    Not on file   Social History Narrative    Not on file     Social Determinants of Health     Financial Resource Strain: Not on file   Food Insecurity: Food Insecurity Present (1/17/2024)    Hunger Vital Sign     Worried About Running Out of Food in the Last Year: Sometimes true     Ran Out of Food in the Last Year: Sometimes true   Transportation Needs: No Transportation Needs (1/17/2024)    PRAPARE - Transportation     Lack of Transportation (Medical): No     Lack of Transportation (Non-Medical): No   Physical Activity: Not on file   Stress: Not on file   Social Connections: Not on file   Intimate Partner Violence: Not on file   Housing Stability: Low Risk  (1/17/2024)    Housing Stability Vital Sign     Unable to Pay for Housing in the Last Year: No     Number of Places Lived in the Last Year: 1     Unstable Housing in the Last Year: No     Scheduled Meds:  Continuous Infusions:No current facility-administered medications for this visit.    PRN Meds:.  Allergies   Allergen Reactions    Cephalexin Hives     Skin peeling  Tolerates penicillins (tolerated unasyn and zosyn)    Metformin GI Intolerance       Physical Examination:    /78   Ht 6' (1.829 m)   Wt 123 kg (272  lb)   BMI 36.89 kg/m²     Gen: A&Ox3, NAD  Cardiac: regular rate  Chest: non labored breathing  Abdomen: Non-distended    Cervcial Spine: Negative Spurling's    Right Upper Extremity:  Prior burn well-healed without signs of infection  No obvious deformity of the shoulder, arm, elbow, forearm, wrist, hand  Dorsal radial prominences at the index DIP, long DIP and right PIP.  Index DIP 30 deg arc of motion, Long DIP 30 deg arc of motion, ring PIP 80 deg arc of motion  Sensation intact to light touch in the axillary median, ulnar, and radial nerve distributions motor intact median, radial, and ulnar distributions  2+RP      Left Upper Extremity:  Prior incisions well-healed without signs of infection  No obvious deformity of the shoulder, arm, elbow, forearm, wrist, hand  Left index DIP prominence that is fixed and nontender  Left index DIP ROM 30 deg arc  Sensation intact to light touch in the axillary median, ulnar, and radial nerve distributions  Motor intact median, radial and ulnar distribution  2+RP    Studies:  Radiographs: I personally reviewed and independently interpreted the available radiographs.   X-ray left hand demonstrates diffuse osteoarthritis with joint space effacement of the index DIP with large dorsal osteophyte and subchondral sclerosis.      Assessment and Plan:  1. Mucoid cyst of joint  Case request operating room: EXCISION MUCOID CYST, INDEX FINGER DIP    Case request operating room: EXCISION MUCOID CYST, INDEX FINGER DIP          59 y.o. male presents in follow up for the above diagnosis. Discussion was had with the patient regarding treatment options including operative and nonoperative modalities.  Given that non operative management including NSAIDs, rest and activity modification have failed to adequately control the patient's symptoms and the patient's desire to maintain motion at the left index DIP, recommendation is made to undergo mucoid cyst excision.  He has complete resolve his  previous infection and I have low concern for this increasing his risk of complications.  Risks of the procedure including infection, blood loss, recurrence of the mucoid cyst and osteophytes and damage to surrounding structures were reviewed with the patient and he elected to proceed with left index DIP mucoid cyst excision. The patient has a severe allergy to cephalosporins and so this medication will be avoided in the nikki operative period  Informed consent obtained    Risk factors: Diabetes  Diabetes does increase his risk complications including infection, wound healing difficulties after mucous cyst excision.  He understands this risk and will continue to work on perioperative blood glucose control.    Left index finger mucous cyst excision  Local anesthetic    he expressed understanding of the plan and agreed. We encouraged them to contact our office with any questions or concerns.       Carroll Mccarthy MD  Hand and Upper Extremity Surgery      *This note was dictated using Dragon voice recognition software. Please excuse any word substitutions or errors.*

## 2024-03-15 NOTE — TELEPHONE ENCOUNTER
Hi Dr. Ji  Patient called to report that the muscle relaxer's are working and he is feeling better. He has an appointment with orthopedics on Monday. Thank you.

## 2024-03-15 NOTE — H&P
HAND & UPPER EXTREMITY OFFICE VISIT   Referred By:  No referring provider defined for this encounter.      Chief Complaint:     Left index DIP arthritis   Right index DIP, long DIP and ring PIP arthritis    Previous History:   Left hand dog bite with deep infection 1/16/24 s/p IV antibiotics and bedside I&D (healed).  No recurrence of infection off of antibiotics.  Doing well.    Interval History:  Presents today to discuss the prominences of his left index DIP as well as right index DIP. Long PIP and ring PIP joints.  He states that they have been present for years and are slowing enlarging.  His left index DIP bothers him the most especially when working on projects and when he bumps the left index finger. He has no radiation of the pain and no numbness or tingling. The patient has an A1C of 7.3. He would like to undergo excision of the left index DIP prominence.    Past Medical History:  Past Medical History:   Diagnosis Date    Chronic kidney disease 2012    Chronic pain disorder     Diabetes mellitus (HCC)     H/O eye surgery     High blood sugar     Hypertension     Liver disease      Past Surgical History:   Procedure Laterality Date    HERNIA REPAIR      INCISION AND DRAINAGE  01/16/2024    KIDNEY SURGERY      KNEE SURGERY  1980    MOUTH SURGERY      DC AMPUTATION METATARSAL W/TOE SINGLE Left 6/1/2022    Procedure: RAY RESECTION FOOT;  Surgeon: Hannah Melgoza DPM;  [facility];  Service: Podiatry    DC RPR AA HERNIA 1ST < 3 CM REDUCIBLE N/A 7/14/2023    Procedure: REPAIR HERNIA INCISIONAL;  Surgeon: Matt Melo MD;  Location[facility];  Service: General    WOUND DEBRIDEMENT Left 4/28/2022    Procedure: Left foot washout;  Surgeon: Hannah Melgoza DPM;  [facility];  Service: Podiatry    WOUND DEBRIDEMENT Left 6/6/2022    Procedure: DEBRIDEMENT WOUND (WASH OUT);  Surgeon: Hannah Melgoza DPM;  [facility];  Service: Podiatry     Family History   Problem Relation Age  of Onset    Diabetes Paternal Grandfather     Diabetes Paternal Grandmother      Social History     Socioeconomic History    Marital status: Registered Domestic Partner     Spouse name: Not on file    Number of children: Not on file    Years of education: Not on file    Highest education level: Not on file   Occupational History    Not on file   Tobacco Use    Smoking status: Never    Smokeless tobacco: Never   Vaping Use    Vaping status: Never Used   Substance and Sexual Activity    Alcohol use: Yes     Alcohol/week: 1.0 standard drink of alcohol     Types: 1 Cans of beer per week     Comment: ocassional    Drug use: Never    Sexual activity: Not Currently     Partners: Female     Birth control/protection: Abstinence   Other Topics Concern    Not on file   Social History Narrative    Not on file     Social Determinants of Health     Financial Resource Strain: Not on file   Food Insecurity: Food Insecurity Present (1/17/2024)    Hunger Vital Sign     Worried About Running Out of Food in the Last Year: Sometimes true     Ran Out of Food in the Last Year: Sometimes true   Transportation Needs: No Transportation Needs (1/17/2024)    PRAPARE - Transportation     Lack of Transportation (Medical): No     Lack of Transportation (Non-Medical): No   Physical Activity: Not on file   Stress: Not on file   Social Connections: Not on file   Intimate Partner Violence: Not on file   Housing Stability: Low Risk  (1/17/2024)    Housing Stability Vital Sign     Unable to Pay for Housing in the Last Year: No     Number of Places Lived in the Last Year: 1     Unstable Housing in the Last Year: No     Scheduled Meds:  Continuous Infusions:No current facility-administered medications for this visit.    PRN Meds:.  Allergies   Allergen Reactions    Cephalexin Hives     Skin peeling  Tolerates penicillins (tolerated unasyn and zosyn)    Metformin GI Intolerance       Physical Examination:    /78   Ht 6' (1.829 m)   Wt 123 kg (272  lb)   BMI 36.89 kg/m²     Gen: A&Ox3, NAD  Cardiac: regular rate  Chest: non labored breathing  Abdomen: Non-distended    Cervcial Spine: Negative Spurling's    Right Upper Extremity:  Prior burn well-healed without signs of infection  No obvious deformity of the shoulder, arm, elbow, forearm, wrist, hand  Dorsal radial prominences at the index DIP, long DIP and right PIP.  Index DIP 30 deg arc of motion, Long DIP 30 deg arc of motion, ring PIP 80 deg arc of motion  Sensation intact to light touch in the axillary median, ulnar, and radial nerve distributions motor intact median, radial, and ulnar distributions  2+RP      Left Upper Extremity:  Prior incisions well-healed without signs of infection  No obvious deformity of the shoulder, arm, elbow, forearm, wrist, hand  Left index DIP prominence that is fixed and nontender  Left index DIP ROM 30 deg arc  Sensation intact to light touch in the axillary median, ulnar, and radial nerve distributions  Motor intact median, radial and ulnar distribution  2+RP    Studies:  Radiographs: I personally reviewed and independently interpreted the available radiographs.   X-ray left hand demonstrates diffuse osteoarthritis with joint space effacement of the index DIP with large dorsal osteophyte and subchondral sclerosis.      Assessment and Plan:  1. Mucoid cyst of joint  Case request operating room: EXCISION MUCOID CYST, INDEX FINGER DIP    Case request operating room: EXCISION MUCOID CYST, INDEX FINGER DIP          59 y.o. male presents in follow up for the above diagnosis. Discussion was had with the patient regarding treatment options including operative and nonoperative modalities.  Given that non operative management including NSAIDs, rest and activity modification have failed to adequately control the patient's symptoms and the patient's desire to maintain motion at the left index DIP, recommendation is made to undergo mucoid cyst excision.  He has complete resolve his  previous infection and I have low concern for this increasing his risk of complications.  Risks of the procedure including infection, blood loss, recurrence of the mucoid cyst and osteophytes and damage to surrounding structures were reviewed with the patient and he elected to proceed with left index DIP mucoid cyst excision. The patient has a severe allergy to cephalosporins and so this medication will be avoided in the nikki operative period  Informed consent obtained    Risk factors: Diabetes  Diabetes does increase his risk complications including infection, wound healing difficulties after mucous cyst excision.  He understands this risk and will continue to work on perioperative blood glucose control.    Left index finger mucous cyst excision  Local anesthetic    he expressed understanding of the plan and agreed. We encouraged them to contact our office with any questions or concerns.       Carroll Mccarthy MD  Hand and Upper Extremity Surgery      *This note was dictated using Dragon voice recognition software. Please excuse any word substitutions or errors.*

## 2024-03-18 ENCOUNTER — OFFICE VISIT (OUTPATIENT)
Dept: OBGYN CLINIC | Facility: CLINIC | Age: 59
End: 2024-03-18
Payer: MEDICARE

## 2024-03-18 VITALS
WEIGHT: 272 LBS | SYSTOLIC BLOOD PRESSURE: 157 MMHG | BODY MASS INDEX: 36.84 KG/M2 | HEIGHT: 72 IN | DIASTOLIC BLOOD PRESSURE: 78 MMHG

## 2024-03-18 DIAGNOSIS — M54.16 LEFT LUMBAR RADICULOPATHY: Primary | ICD-10-CM

## 2024-03-18 DIAGNOSIS — M51.36 DDD (DEGENERATIVE DISC DISEASE), LUMBAR: ICD-10-CM

## 2024-03-18 PROCEDURE — 99214 OFFICE O/P EST MOD 30 MIN: CPT | Performed by: PHYSICIAN ASSISTANT

## 2024-03-18 RX ORDER — METHOCARBAMOL 750 MG/1
1500 TABLET, FILM COATED ORAL EVERY 8 HOURS SCHEDULED
Qty: 30 TABLET | Refills: 0 | Status: SHIPPED | OUTPATIENT
Start: 2024-03-18 | End: 2024-03-27 | Stop reason: SDUPTHER

## 2024-03-18 NOTE — PROGRESS NOTES
Patient Name:  Yoni Castillo  MRN:  [id number]    Assessment & Plan     Left-sided low back pain and left-sided lumbar radiculopathy after lifting injury 1 month ago.  Patient exhibits signs and symptoms of acute left-sided lumbar radiculopathy after lifting heavy pallets 1 month ago.  He has tried and failed conservative management including activity modification, Medrol Dosepak, ibuprofen, Robaxin, and home exercises.  Due to persistent symptoms MRI of the lumbar spine will be obtained for further evaluation.  Recommended formal physical therapy.  Patient declined at this time due to limited response from his home exercises.  At this time recommend he continue Robaxin and Tylenol.  Advised against taking NSAIDs due to his CKD.  Follow-up after MRI with pain management.  Referral placed today for pain management.    Chief Complaint     Low back pain    History of the Present Illness     Yoni Castillo is a 59 y.o. male who reports to the office today for evaluation of his lumbar spine.  Patient noted an onset of lumbar spine pain approximately 1 month ago.  He denies any injury or trauma but states the pain began after lifting up heavy pallets weighing approximately 70 pounds each.  Since then he notes left-sided lumbar spine pain with radiation distally into the left lower extremity to the toes.  He notes intermittent numbness and tingling in distribution as well.  He does have a history of neuropathy however states the numbness and tingling is new since the lifting incident.  He notes weakness in the left lower extremity as well.  He states the pain is currently 9 out of 10 in intensity worse with bending twisting and increased activity.  He describes the pain as sharp stabbing and severe.  He did see his PCP initially who prescribed a Medrol Dosepak which provided transient improvement.  He also went to the emergency department and received methocarbamol and ibuprofen which also provided transient improvement.   He has been performing home exercises without lasting improvement as well.  He denies any bowel or bladder dysfunction.  No saddle paresthesias.    Physical Exam     Ht 6' (1.829 m)   Wt 123 kg (272 lb)   BMI 36.89 kg/m²     Lumbar spine: No gross deformity.  No tenderness midline lumbar spine.  No tenderness right paraspinal musculature.  There is severe tenderness left paraspinal musculature.  No tenderness bilateral SI joints and bilateral piriformis musculature.  Motor intact L2-S1 bilaterally with 5-5 strength.  Positive straight leg raise on the left.  Negative straight leg raise on the right.  Sensation intact but diminished diffusely about the left lower extremity.  Sensation intact L2-S1 in the right lower extremity.  Negative KIM test bilaterally.    Eyes: Anicteric sclerae.  ENT: Trachea midline.  Lungs: Normal respiratory effort.  CV: Capillary refill is less than 2 seconds.  Skin: Intact without erythema.  Lymph: No palpable lymphadenopathy.  Neuro: Sensation is grossly intact to light touch.  Psych: Mood and affect are appropriate.    Data Review     I have personally reviewed pertinent films in PACS, and my interpretation follows:    X-rays lumbar spine 3/12/2024: No acute osseous abnormality.  No fracture or signs of instability.  Mild scoliotic deformity appreciated.  Multilevel DDD and facet arthrosis appreciated as well.    Past Medical History:   Diagnosis Date    Chronic kidney disease 2012    Chronic pain disorder     Diabetes mellitus (HCC)     H/O eye surgery     High blood sugar     Hypertension     Liver disease        Past Surgical History:   Procedure Laterality Date    HERNIA REPAIR      INCISION AND DRAINAGE  01/16/2024    KIDNEY SURGERY      KNEE SURGERY  1980    MOUTH SURGERY      [place] AMPUTATION METATARSAL W/TOE SINGLE Left 6/1/2022    Procedure: RAY RESECTION FOOT;  Surgeon: Hannah Melgoza DPM;  [facility];  Service: Podiatry    MO RPR AA HERNIA 1ST < 3 CM REDUCIBLE N/A  7/14/2023    Procedure: REPAIR HERNIA INCISIONAL;  Surgeon: Matt Melo MD;  [facility];  Service: General    WOUND DEBRIDEMENT Left 4/28/2022    Procedure: Left foot washout;  Surgeon: Hannah Melgoza DPM;  [facility];  Service: Podiatry    WOUND DEBRIDEMENT Left 6/6/2022    Procedure: DEBRIDEMENT WOUND (WASH OUT);  Surgeon: Hannah Melgoza DPM;  [facility];  Service: Podiatry       Allergies   Allergen Reactions    Cephalexin Hives     Skin peeling  Tolerates penicillins (tolerated unasyn and zosyn)    Metformin GI Intolerance       Current Outpatient Medications on File Prior to Visit   Medication Sig Dispense Refill    Accu-Chek FastClix Lancets MISC Use to test blood sugar once a day 102 each 0    Alpha-Lipoic Acid 600 MG CAPS Take 600 mg by mouth 2 (two) times a day        ammonium lactate (LAC-HYDRIN) 12 % lotion       Apple Cider Vinegar 300 MG TABS Take 300 mg by mouth daily        Ascorbic Acid (vitamin C) 1000 MG tablet Take 1,000 mg by mouth 2 (two) times a day 2 tablet twice a day      BENFOTIAMINE PO       beta carotene 30 MG capsule Take 30 mg by mouth 2 (two) times a day        Blood Glucose Monitoring Suppl (Accu-Chek Guide Me) w/Device KIT Use to check blood sugar once a day 1 kit 0    carvedilol (COREG) 25 mg tablet Take 1 tablet (25 mg total) by mouth 2 (two) times a day with meals 180 tablet 3    Chromium Picolinate 200 MCG TABS Take by mouth      Empagliflozin (Jardiance) 25 MG TABS Take 1 tablet (25 mg total) by mouth daily (Patient taking differently: Take 25 mg by mouth daily at bedtime) 90 tablet 3    Garlic 1200 MG CAPS Take by mouth 2 (two) times a day       glucose blood (Accu-Chek Guide) test strip Use to check blood sugar once a day 100 strip 0    IRON, FERROUS SULFATE, PO Take by mouth      ketoconazole (NIZORAL) 2 % cream       ketorolac (ACULAR) 0.5 % ophthalmic solution       lidocaine (Lidoderm) 5 % Apply 1 patch topically over 12 hours  daily Remove & Discard patch within 12 hours or as directed by MD 30 patch 4    lisinopril-hydrochlorothiazide (PRINZIDE,ZESTORETIC) 20-12.5 MG per tablet Take 1 tablet by mouth 2 (two) times a day 180 tablet 3    Lutein-Bilberry (LUTEIN PLUS BILBERRY PO) Take by mouth      methocarbamol (ROBAXIN) 750 mg tablet Take 2 tablets (1,500 mg total) by mouth every 8 (eight) hours 30 tablet 0    methylPREDNISolone 4 MG tablet therapy pack Use as directed on package 21 each 0    Ozempic, 0.25 or 0.5 MG/DOSE, 2 MG/3ML injection pen       Pyridoxine HCl (B-6 PO) Take by mouth      semaglutide, 0.25 or 0.5 mg/dose, (Ozempic, 0.25 or 0.5 MG/DOSE,) 2 mg/1.5 mL injection pen 0.5 mg weekly (Patient taking differently: Inject 0.5 mg under the skin every 7 days 0.5 mg weekly) 1.5 mL 3    traMADol (Ultram) 50 mg tablet 2 tablets in evening as needed for severe pain relief 60 tablet 0    Turmeric (QC TUMERIC COMPLEX PO) Take 1,000 mg by mouth once Tumeric /curcimin      vitamin B-12 (CYANOCOBALAMIN) 100 MCG TABS Take 50 mcg by mouth daily      VITAMIN D PO Take by mouth 2 (two) times a day      vitamin E, tocopherol, 400 units capsule Take 400 Units by mouth 2 (two) times a day      Zinc 50 MG CAPS Take by mouth 2 (two) times a day        No current facility-administered medications on file prior to visit.       Social History     Tobacco Use    Smoking status: Never    Smokeless tobacco: Never   Vaping Use    Vaping status: Never Used   Substance Use Topics    Alcohol use: Yes     Alcohol/week: 1.0 standard drink of alcohol     Types: 1 Cans of beer per week     Comment: ocassional    Drug use: Never       Family History   Problem Relation Age of Onset    Diabetes Paternal Grandfather     Diabetes Paternal Grandmother        Review of Systems     As stated in the HPI. All other systems reviewed and are negative.

## 2024-03-19 ENCOUNTER — TELEPHONE (OUTPATIENT)
Age: 59
End: 2024-03-19

## 2024-03-19 DIAGNOSIS — M79.605 LEFT LEG PAIN: ICD-10-CM

## 2024-03-19 RX ORDER — TRAMADOL HYDROCHLORIDE 50 MG/1
TABLET ORAL
Qty: 60 TABLET | Refills: 0 | Status: SHIPPED | OUTPATIENT
Start: 2024-03-19

## 2024-03-19 NOTE — TELEPHONE ENCOUNTER
Caller: Yoni    Doctor/Office: Eric    Callback#: [phone]        Patient is requesting a transfer of care for the following reason: No longer would like to see Dr Reyes was not happy with care.        Doctor: Eric     Would you release patient from your care?      Doctor: Dacia     Would you take patient on as a patient?        Please advise,   Thank you.

## 2024-03-20 ENCOUNTER — HOSPITAL ENCOUNTER (OUTPATIENT)
Dept: MRI IMAGING | Facility: HOSPITAL | Age: 59
Discharge: HOME | End: 2024-03-20

## 2024-03-20 DIAGNOSIS — M54.16 LEFT LUMBAR RADICULOPATHY: ICD-10-CM

## 2024-03-20 NOTE — TELEPHONE ENCOUNTER
"S/W pt.  Advised pt of the same.  Pt upset with his care at his [facility] with [facility].  Pt stated he was never offered any treatment, \"nothing,\" including injections.  Due to length of time of when pt was last seen, pt would be considered a new pt, not a STEVE.  Ok to schedule?  "

## 2024-03-20 NOTE — TELEPHONE ENCOUNTER
S/W pt.  Advised pt of the same.  Pt stated he is willing to reconsider injections but also wants to know what other options are available first.  Pt verbalized understanding.  Still okay to schedule?

## 2024-03-21 ENCOUNTER — TELEPHONE (OUTPATIENT)
Age: 59
End: 2024-03-21

## 2024-03-21 DIAGNOSIS — M54.16 LEFT LUMBAR RADICULOPATHY: Primary | ICD-10-CM

## 2024-03-21 NOTE — TELEPHONE ENCOUNTER
Caller: Patient     Doctor: Jay     Reason for call: Patient states that he tried to have lumbar MRI done last night without sedation and was not able to complete the test. He has tried oral meds prior to MRI in the past and that did not work. He says he needs to be sedated due to his claustrophobia. Please call patient to advise    Call back#: [phone]

## 2024-03-22 PROBLEM — M67.40 MUCOID CYST OF JOINT: Status: ACTIVE | Noted: 2024-03-22

## 2024-03-22 NOTE — DISCHARGE INSTR - AVS FIRST PAGE
POST-OPERATIVE INSTRUCTIONS  Mucoid Cyst Excision    You have just undergone a mucoid cyst excision by Dr. Mccarthy in the operating room.  It is our wish that your postoperative recovery be as quick and comfortable as possible.  Please carefully review the following items that are important in your recovery.    Pain Control:  After any operation, a certain degree of pain is to be expected.  A prescription for narcotic pain medication as well as acetaminophen and/or ibruprofen has been electronically sent to your pharmacy. Do not exceed 3000 mg of Tylenol per day. This medication will relieve most of your pain but may not relieve all of the pain. Some pain is normal post operatively.     When you go home, please keep your operated arm elevated at all times (above the level of your heart.)  If you do this, your swelling will be diminished and your pain will be diminished as well.    Dressing Care:  After surgery, make sure that your dressing is kept dry.  You can take a shower if you cover your arm with a plastic bag, such as a newspaper bag, and use tape or rubber bands. If your dressing gets wet you may take it off, place Band-Aids on the wound and re-cover it with sterile dressings which you can obtain at your local drug store.    Please remove your dressing down to the incision 5 days after your surgery. For example, if your had surgery on a Tuesday, do this on Sunday.  If your surgery was on Friday, do this on Wednesday.   If your dressing gets wet prior to removing it, please take if off and place something clean, or bandaids, on the wound.    Weight Bearing:  You should NOT bear weight >5lbs through your operative extremity. Do not push off using your operative extremity.     If you have a removable wrist brace you may wear that over your surgical dressing until your first follow up appointment if as you feel comfortable. It is not required to wear this splint.     It is ok to move your fingers as tolerated to  prevent stiffness. You may also use your operative hand to assist with light activities of daily living such as putting on clothes, brushing your teeth and eating as tolerated    Follow Up:  If you don't already have a scheduled postoperative appointment, please call our office for a follow-up appointment. It is best to call the day after surgery to make an appointment in 10-14 days.  At your first postoperative visit, you will be seen by either Dr. Mccarthy, his PA; or one of their associates. The sutures will be removed and you may be asked to see a hand therapist to optimize your functional result. Each of the hand therapists that you will be referred to have received special training in the care of the hand and upper extremity.    When to Call:  It is normal to see minor staining on the hand surgery dressing after surgery. If there is significant bleeding, you are advised to call the office during regular office hours to have this checked.     If you feel that you have a surgical emergency postoperatively that requires immediate attention, please call 9-1-1. In addition, any emergency can also be handled by the emergency room.      If you have questions regarding your surgery postop that you feel requires attention, please call the office.   If this occurs after our regular office hours, there is a message with instructions to talk to the physician on call.

## 2024-03-25 ENCOUNTER — HOSPITAL ENCOUNTER (OUTPATIENT)
Age: 59
Setting detail: OUTPATIENT SURGERY
Discharge: HOME | End: 2024-03-25
Attending: ORTHOPAEDIC SURGERY | Admitting: ORTHOPAEDIC SURGERY
Payer: MEDICARE

## 2024-03-25 VITALS
RESPIRATION RATE: 18 BRPM | WEIGHT: 268 LBS | HEART RATE: 74 BPM | BODY MASS INDEX: 36.35 KG/M2 | SYSTOLIC BLOOD PRESSURE: 127 MMHG | TEMPERATURE: 98.3 F | OXYGEN SATURATION: 96 % | DIASTOLIC BLOOD PRESSURE: 78 MMHG

## 2024-03-25 DIAGNOSIS — Z47.89 AFTERCARE FOLLOWING SURGERY OF THE MUSCULOSKELETAL SYSTEM: ICD-10-CM

## 2024-03-25 DIAGNOSIS — M67.40 MUCOID CYST OF JOINT: Primary | ICD-10-CM

## 2024-03-25 LAB — GLUCOSE SERPL-MCNC: 174 MG/DL (ref 65–140)

## 2024-03-25 PROCEDURE — 26160 REMOVE TENDON SHEATH LESION: CPT

## 2024-03-25 PROCEDURE — 82948 REAGENT STRIP/BLOOD GLUCOSE: CPT

## 2024-03-25 PROCEDURE — 26160 REMOVE TENDON SHEATH LESION: CPT | Performed by: ORTHOPAEDIC SURGERY

## 2024-03-25 RX ORDER — OXYCODONE HYDROCHLORIDE 5 MG/1
5 TABLET ORAL EVERY 6 HOURS PRN
Qty: 5 TABLET | Refills: 0 | Status: SHIPPED | OUTPATIENT
Start: 2024-03-25 | End: 2024-03-28

## 2024-03-25 RX ORDER — ACETAMINOPHEN 325 MG/1
650 TABLET ORAL EVERY 6 HOURS PRN
Status: DISCONTINUED | OUTPATIENT
Start: 2024-03-25 | End: 2024-03-25 | Stop reason: HOSPADM

## 2024-03-25 RX ORDER — CHLORHEXIDINE GLUCONATE 4 G/100ML
SOLUTION TOPICAL DAILY PRN
Status: DISCONTINUED | OUTPATIENT
Start: 2024-03-25 | End: 2024-03-25 | Stop reason: HOSPADM

## 2024-03-25 RX ORDER — CHLORHEXIDINE GLUCONATE ORAL RINSE 1.2 MG/ML
15 SOLUTION DENTAL ONCE
Status: COMPLETED | OUTPATIENT
Start: 2024-03-25 | End: 2024-03-25

## 2024-03-25 RX ORDER — ACETAMINOPHEN 500 MG
1000 TABLET ORAL EVERY 6 HOURS PRN
Qty: 60 TABLET | Refills: 0 | Status: SHIPPED | OUTPATIENT
Start: 2024-03-25

## 2024-03-25 RX ORDER — MAGNESIUM HYDROXIDE 1200 MG/15ML
LIQUID ORAL AS NEEDED
Status: DISCONTINUED | OUTPATIENT
Start: 2024-03-25 | End: 2024-03-25 | Stop reason: HOSPADM

## 2024-03-25 RX ADMIN — CHLORHEXIDINE GLUCONATE 15 ML: 1.2 SOLUTION ORAL at 06:36

## 2024-03-25 RX ADMIN — LIDOCAINE HYDROCHLORIDE,EPINEPHRINE BITARTRATE: 10; .01 INJECTION, SOLUTION INFILTRATION; PERINEURAL at 06:43

## 2024-03-25 NOTE — OP NOTE
OPERATIVE REPORT  PATIENT NAME: Yoni Castillo    :  1965  MRN: [id number]  Pt Location:  OR ROOM 06    SURGERY DATE: 3/25/2024    Surgeons and Role:     * Carroll Mccarthy MD - Primary     * Yesenia Carrasquillo PA-C    Preop Diagnosis:  Mucoid cyst of joint [M67.40]    Post-Op Diagnosis Codes:     * Mucoid cyst of joint [M67.40]    Procedure(s):  Left - EXCISION MUCOID CYST. INDEX FINGER DIP    Specimen(s):  * No specimens in log *    Estimated Blood Loss:   Minimal    Drains:  * No LDAs found *    Anesthesia Type:   Local    Operative Indications:  Mucoid cyst of joint [M67.40]    Painful mucous cyst which has been refractory to conservative management.  Wished to proceed with operative excision.    Operative Findings:  Mucous cyst overlying a large bony osteophyte on the ulnar aspect of the left index finger DIP joint distal phalanx    Complications:   None    Procedure and Technique:  On the day of surgery, I met the patient in the pre-operative area. The patient was identified by name and . Once again the risks benefits and alternatives of left index finger mucous cyst excision were discussed with the patient. Risks included pain, stiffness, swelling, injury to neurovascular or musculoskeletal structure, need for reoperation, thromboembolic event, death, and related anesthetic complications. Benefits included overall improvement in symptoms and function.     Patient was amenable to proceed with surgery. he was brought to the OR. The operative extremity was prepped and draped in a standard fashion. A timeout was performed prior to incision identifying the name and laterality of the procedure which was corroborated with imaging that was present in the room. Pre-operative antibiotics were administered.      A local injection of 50/50 solution of 1% lidocaine and 0.25% marcaine was performed.      A finger tournicot was applied.      A  2cm L shaped incision was made with the transverse limb across the  DIP extension crease and cyst and curved longitudinal portion along the ulnar aspect of the digit. The dorsal skin flap was raised and the extensor tendon identified. We identified the cystic tissue along the ulnar/distal aspect of the DIP joint, and this tissue was complete excised with the use of [place] blade and rongeur. The extensor tendon was elevated to visualized the DIP joint and small osteophytes were excised with a rongeur. Clinically there was no further mass or bump on the dorsum of the finger.  The tendon was inspected and intact on its distal phalanx insertion.  The tourniquet was removed and we irrigated the finger.      The wound was closed with 3-0 nylon for the skin.     Sterile dressing was applied.      Post operatively the patient may use the operative extremity as tolerated for light activities. They will follow up as planned within 2 weeks for suture removal.      I was present for the entire procedure., A qualified resident physician was not available., and A physician assistant was required during the procedure for retraction, tissue handling, dissection and suturing.    Patient Disposition:  PACU         SIGNATURE: Carroll Mccarthy MD  DATE: March 25, 2024  TIME: 8:09 AM

## 2024-03-25 NOTE — INTERVAL H&P NOTE
H&P reviewed. After examining the patient I find no changes in the patients condition since the H&P had been written.    Vitals:    03/25/24 0625   BP: 108/70   Pulse: 98   Resp: 16   Temp: 98.5 °F (36.9 °C)   SpO2: 96%

## 2024-03-27 DIAGNOSIS — M51.36 DDD (DEGENERATIVE DISC DISEASE), LUMBAR: ICD-10-CM

## 2024-03-27 RX ORDER — METHOCARBAMOL 750 MG/1
1500 TABLET, FILM COATED ORAL EVERY 8 HOURS SCHEDULED
Qty: 30 TABLET | Refills: 0 | Status: SHIPPED | OUTPATIENT
Start: 2024-03-27 | End: 2024-03-27 | Stop reason: SDUPTHER

## 2024-03-27 RX ORDER — METHOCARBAMOL 750 MG/1
1500 TABLET, FILM COATED ORAL EVERY 8 HOURS SCHEDULED
Qty: 30 TABLET | Refills: 0 | Status: SHIPPED | OUTPATIENT
Start: 2024-03-27

## 2024-03-27 RX ORDER — METHOCARBAMOL 750 MG/1
1500 TABLET, FILM COATED ORAL EVERY 8 HOURS SCHEDULED
Qty: 30 TABLET | Refills: 0 | OUTPATIENT
Start: 2024-03-27

## 2024-03-27 NOTE — TELEPHONE ENCOUNTER
Caller: Patient     Doctor: Jay     Reason for call: Patient states he has been in a lot of pain and asked for something to help this. Patient states tylenol is not helping     Call back#: [phone]   From: Chika Martinez  Sent: 5/16/2018 12:16 PM CDT  Subject: Medication Renewal Request    Anajayashree Lozanodenise would like a refill of the following medications:     Surya DIALLO 07R Does not apply [facility] Chapo Zavala MD]   Patient Comment: Please

## 2024-03-27 NOTE — TELEPHONE ENCOUNTER
Caller: Patient    Doctor: Néstor Thompson PA-C    Reason for call: Patient asking if the refill for Methocarbamol can be sent to the pharmacy below.       [facility] - [address] [facility] [phone]     Call back#: [phone]

## 2024-04-05 ENCOUNTER — OFFICE VISIT (OUTPATIENT)
Dept: OBGYN CLINIC | Facility: CLINIC | Age: 59
End: 2024-04-05

## 2024-04-05 VITALS
BODY MASS INDEX: 36.3 KG/M2 | WEIGHT: 268 LBS | HEIGHT: 72 IN | DIASTOLIC BLOOD PRESSURE: 76 MMHG | SYSTOLIC BLOOD PRESSURE: 137 MMHG

## 2024-04-05 DIAGNOSIS — M67.40 MUCOID CYST OF JOINT: Primary | ICD-10-CM

## 2024-04-05 PROCEDURE — 99024 POSTOP FOLLOW-UP VISIT: CPT | Performed by: ORTHOPAEDIC SURGERY

## 2024-04-05 NOTE — PROGRESS NOTES
HAND & UPPER EXTREMITY OFFICE VISIT   Referred By:  No referring provider defined for this encounter.      Chief Complaint:     Index finger mass    Surgery:  Surgery Date: 3/25/2024 - Excision Mucoid Cyst, Index Finger Dip - Left     History of Present Illness:   Patient presents now 11 days status post the above surgery. he reports doing very well since the procedure. He notes some pain if he bumps the finger but otherwise he denies any issues. He denies fevers/chills or numbness/tingling.     Past Medical History:  Past Medical History:   Diagnosis Date    Chronic kidney disease 2012    Chronic pain disorder     Diabetes mellitus (HCC)     H/O eye surgery     High blood sugar     Hypertension     Liver disease      Past Surgical History:   Procedure Laterality Date    GANGLION CYST EXCISION Left 3/25/2024    Procedure: EXCISION MUCOID CYST, INDEX FINGER DIP;  Surgeon: Carroll Mccarthy MD;  [facility];  Service: Orthopedics    HERNIA REPAIR      INCISION AND DRAINAGE  01/16/2024    KIDNEY SURGERY      KNEE SURGERY  1980    MOUTH SURGERY      [place] AMPUTATION METATARSAL W/TOE SINGLE Left 6/1/2022    Procedure: RAY RESECTION FOOT;  Surgeon: Hannah Melgoza DPM;  [facility];  Service: Podiatry    [place] RPR AA HERNIA 1ST < 3 CM REDUCIBLE N/A 7/14/2023    Procedure: REPAIR HERNIA INCISIONAL;  Surgeon: Matt Melo MD;  [facility];  Service: General    WOUND DEBRIDEMENT Left 4/28/2022    Procedure: Left foot washout;  Surgeon: Hannah Melgoza DPM;  [facility];  Service: Podiatry    WOUND DEBRIDEMENT Left 6/6/2022    Procedure: DEBRIDEMENT WOUND (WASH OUT);  Surgeon: Hannah Melgoza DPM;  [facility];  Service: Podiatry     Family History   Problem Relation Age of Onset    Diabetes Paternal Grandfather     Diabetes Paternal Grandmother      Social History     Socioeconomic History    Marital status: Registered Domestic Partner     Spouse name: Not on file    Number  of children: Not on file    Years of education: Not on file    Highest education level: Not on file   Occupational History    Not on file   Tobacco Use    Smoking status: Never    Smokeless tobacco: Never   Vaping Use    Vaping status: Never Used   Substance and Sexual Activity    Alcohol use: Yes     Alcohol/week: 1.0 standard drink of alcohol     Types: 1 Cans of beer per week     Comment: ocassional    Drug use: Never    Sexual activity: Not Currently     Partners: Female     Birth control/protection: Abstinence   Other Topics Concern    Not on file   Social History Narrative    Not on file     Social Determinants of Health     Financial Resource Strain: Not on file   Food Insecurity: Food Insecurity Present (1/17/2024)    Hunger Vital Sign     Worried About Running Out of Food in the Last Year: Sometimes true     Ran Out of Food in the Last Year: Sometimes true   Transportation Needs: No Transportation Needs (1/17/2024)    PRAPARE - Transportation     Lack of Transportation (Medical): No     Lack of Transportation (Non-Medical): No   Physical Activity: Not on file   Stress: Not on file   Social Connections: Not on file   Intimate Partner Violence: Not on file   Housing Stability: Low Risk  (1/17/2024)    Housing Stability Vital Sign     Unable to Pay for Housing in the Last Year: No     Number of Places Lived in the Last Year: 1     Unstable Housing in the Last Year: No     Scheduled Meds:  Continuous Infusions:No current facility-administered medications for this visit.    PRN Meds:.  Allergies   Allergen Reactions    Cephalexin Hives     Skin peeling  Tolerates penicillins (tolerated unasyn and zosyn)    Metformin GI Intolerance       Physical Examination:    /76   Ht 6' (1.829 m)   Wt 122 kg (268 lb)   BMI 36.35 kg/m²     Gen: A&Ox3, NAD    Right Upper Extremity:  Skin CDI  Sensation intact to light touch in the axillary median, ulnar, and radial nerve distributions  Mild stiffness of the long and  ring fingers. Unable to fully extend at the PIP ~20 deg contractures. No palpable Dupuytren's nodules or cords.   2+RP      Left Upper Extremity:  Dressing clean and dry, removed  Underlying incision healing well without signs of infection   Sutures intact, removed  Mild swelling of the index finger DIP  Mild TTP over the incision site  Sensation intact to light touch in the axillary median, ulnar, and radial nerve distributions  Limited DIP ROM due to stiffness  2+RP      Studies:  No new imaging to review      Assessment and Plan:  1. Mucoid cyst of joint            59 y.o. male presents 11 days status post Excision Mucoid Cyst, Index Finger Dip - Left. Sutures removed in the office today. He is doing really well.  His wound is healing appropriately.  He can continue to work on range of motion as tolerated and advance all activities.  I will see him in 4 weeks for repeat incision and range of motion check.    At that time we can also discuss potentially intervening on the nodules/mucous cyst on his right index and middle fingers.    It is recommended he return to the office in 4 weeks.    he expressed understanding of the plan and agreed. We encouraged them to contact our office with any questions or concerns.         Carroll Mccarthy MD  Hand and Upper Extremity Surgery          *This note was dictated using Dragon voice recognition software. Please excuse any word substitutions or errors.*

## 2024-04-23 DIAGNOSIS — M51.36 DDD (DEGENERATIVE DISC DISEASE), LUMBAR: ICD-10-CM

## 2024-04-23 DIAGNOSIS — M79.605 LEFT LEG PAIN: ICD-10-CM

## 2024-04-23 RX ORDER — TRAMADOL HYDROCHLORIDE 50 MG/1
TABLET ORAL
Qty: 60 TABLET | Refills: 0 | Status: SHIPPED | OUTPATIENT
Start: 2024-04-23

## 2024-04-23 NOTE — TELEPHONE ENCOUNTER
Refill must be reviewed and completed by the office or provider. The refill is unable to be approved or denied by the medication management team.    Cannot be delegated   
medtronic

## 2024-04-24 RX ORDER — METHOCARBAMOL 750 MG/1
1500 TABLET, FILM COATED ORAL EVERY 8 HOURS SCHEDULED
Qty: 30 TABLET | Refills: 0 | Status: SHIPPED | OUTPATIENT
Start: 2024-04-24

## 2024-04-26 ENCOUNTER — APPOINTMENT (EMERGENCY)
Dept: RADIOLOGY | Facility: HOSPITAL | Age: 59
End: 2024-04-26
Payer: MEDICARE

## 2024-04-26 ENCOUNTER — HOSPITAL ENCOUNTER (EMERGENCY)
Facility: HOSPITAL | Age: 59
Discharge: HOME | End: 2024-04-26
Attending: EMERGENCY MEDICINE
Payer: MEDICARE

## 2024-04-26 VITALS
HEIGHT: 72 IN | HEART RATE: 83 BPM | TEMPERATURE: 98.2 F | RESPIRATION RATE: 18 BRPM | BODY MASS INDEX: 36.76 KG/M2 | WEIGHT: 271.39 LBS | SYSTOLIC BLOOD PRESSURE: 145 MMHG | OXYGEN SATURATION: 96 % | DIASTOLIC BLOOD PRESSURE: 89 MMHG

## 2024-04-26 DIAGNOSIS — S91.332A PUNCTURE WOUND OF LEFT FOOT, INITIAL ENCOUNTER: Primary | ICD-10-CM

## 2024-04-26 PROCEDURE — 99284 EMERGENCY DEPT VISIT MOD MDM: CPT

## 2024-04-26 PROCEDURE — 73630 X-RAY EXAM OF FOOT: CPT

## 2024-04-26 PROCEDURE — 99284 EMERGENCY DEPT VISIT MOD MDM: CPT | Performed by: PHYSICIAN ASSISTANT

## 2024-04-26 PROCEDURE — 87205 SMEAR GRAM STAIN: CPT | Performed by: PHYSICIAN ASSISTANT

## 2024-04-26 PROCEDURE — 87070 CULTURE OTHR SPECIMN AEROBIC: CPT | Performed by: PHYSICIAN ASSISTANT

## 2024-04-26 RX ORDER — LEVOFLOXACIN 500 MG/1
500 TABLET, FILM COATED ORAL DAILY
Qty: 10 TABLET | Refills: 0 | Status: SHIPPED | OUTPATIENT
Start: 2024-04-26 | End: 2024-05-06

## 2024-04-26 NOTE — ED PROVIDER NOTES
History  Chief Complaint   Patient presents with    Foot Pain     Pt reports ingrowing nail that started Wednesday, today getting work, some drainage noted.       60yo male who presents to ER for evaluation of left foot injury. States 2 days ago he was removing some junk from his yard when a board he was carrying accidentally stuck him thought the side of his shoe along the medial proximal aspect of his foot. He immediately removed it when he saw, he removed his shoe and washed the wound.  Today he noticed some redness around the wound and yellow drainage.  He is able to walk on it.  He denies fever nausea or vomiting.  Patient is a diabetic.  He admits to previous foot injuries and loss of his little toe due to osteomyelitis      History provided by:  Patient      Prior to Admission Medications   Prescriptions Last Dose Informant Patient Reported? Taking?   Accu-Chek FastClix Lancets MISC  Self No No   Sig: Use to test blood sugar once a day   Alpha-Lipoic Acid 600 MG CAPS  Self Yes No   Sig: Take 600 mg by mouth 2 (two) times a day     Apple Cider Vinegar 300 MG TABS  Self Yes No   Sig: Take 300 mg by mouth daily     Ascorbic Acid (vitamin C) 1000 MG tablet  Self Yes No   Sig: Take 1,000 mg by mouth 2 (two) times a day 2 tablet twice a day   BENFOTIAMINE PO  Self Yes No   Blood Glucose Monitoring Suppl (Accu-Chek Guide Me) w/Device KIT  Self No No   Sig: Use to check blood sugar once a day   Chromium Picolinate 200 MCG TABS  Self Yes No   Sig: Take by mouth   Empagliflozin (Jardiance) 25 MG TABS  Self No No   Sig: Take 1 tablet (25 mg total) by mouth daily   Patient taking differently: Take 25 mg by mouth daily at bedtime   Garlic 1200 MG CAPS  Self Yes No   Sig: Take by mouth 2 (two) times a day    IRON, FERROUS SULFATE, PO  Self Yes No   Sig: Take by mouth   Lutein-Bilberry (LUTEIN PLUS BILBERRY PO)  Self Yes No   Sig: Take by mouth   Ozempic, 0.25 or 0.5 MG/DOSE, 2 MG/3ML injection pen   Yes No   Pyridoxine HCl  (B-6 PO)  Self Yes No   Sig: Take by mouth   Turmeric (QC TUMERIC COMPLEX PO)  Self Yes No   Sig: Take 1,000 mg by mouth once Tumeric /curcimin   VITAMIN D PO  Self Yes No   Sig: Take by mouth 2 (two) times a day   Zinc 50 MG CAPS  Self Yes No   Sig: Take by mouth 2 (two) times a day    acetaminophen (TYLENOL) 500 mg tablet   No No   Sig: Take 2 tablets (1,000 mg total) by mouth every 6 (six) hours as needed for mild pain   ammonium lactate (LAC-HYDRIN) 12 % lotion   Yes No   beta carotene 30 MG capsule  Self Yes No   Sig: Take 30 mg by mouth 2 (two) times a day     carvedilol (COREG) 25 mg tablet  Self No No   Sig: Take 1 tablet (25 mg total) by mouth 2 (two) times a day with meals   glucose blood (Accu-Chek Guide) test strip  Self No No   Sig: Use to check blood sugar once a day   ketoconazole (NIZORAL) 2 % cream   Yes No   ketorolac (ACULAR) 0.5 % ophthalmic solution  Self Yes No   lidocaine (Lidoderm) 5 %   No No   Sig: Apply 1 patch topically over 12 hours daily Remove & Discard patch within 12 hours or as directed by MD   lisinopril-hydrochlorothiazide (PRINZIDE,ZESTORETIC) 20-12.5 MG per tablet  Self No No   Sig: Take 1 tablet by mouth 2 (two) times a day   methocarbamol (ROBAXIN) 750 mg tablet   No No   Sig: Take 2 tablets (1,500 mg total) by mouth every 8 (eight) hours   methylPREDNISolone 4 MG tablet therapy pack   No No   Sig: Use as directed on package   semaglutide, 0.25 or 0.5 mg/dose, (Ozempic, 0.25 or 0.5 MG/DOSE,) 2 mg/1.5 mL injection pen  Self No No   Si.5 mg weekly   Patient taking differently: Inject 0.5 mg under the skin every 7 days 0.5 mg weekly   traMADol (Ultram) 50 mg tablet   No No   Si tablets in evening as needed for severe pain relief   vitamin B-12 (CYANOCOBALAMIN) 100 MCG TABS  Self Yes No   Sig: Take 50 mcg by mouth daily   vitamin E, tocopherol, 400 units capsule  Self Yes No   Sig: Take 400 Units by mouth 2 (two) times a day      Facility-Administered Medications: None        Past Medical History:   Diagnosis Date    Chronic kidney disease 2012    Chronic pain disorder     Diabetes mellitus (HCC)     H/O eye surgery     High blood sugar     Hypertension     Liver disease        Past Surgical History:   Procedure Laterality Date    GANGLION CYST EXCISION Left 3/25/2024    Procedure: EXCISION MUCOID CYST, INDEX FINGER DIP;  Surgeon: Carroll Mccarthy MD;  [facility];  Service: Orthopedics    HERNIA REPAIR      INCISION AND DRAINAGE  01/16/2024    KIDNEY SURGERY      KNEE SURGERY  1980    MOUTH SURGERY      HI AMPUTATION METATARSAL W/TOE SINGLE Left 6/1/2022    Procedure: RAY RESECTION FOOT;  Surgeon: Hannah Melgoza DPM;  [facility];  Service: Podiatry    HI RPR AA HERNIA 1ST < 3 CM REDUCIBLE N/A 7/14/2023    Procedure: REPAIR HERNIA INCISIONAL;  Surgeon: Matt Melo MD;  [facility];  Service: General    WOUND DEBRIDEMENT Left 4/28/2022    Procedure: Left foot washout;  Surgeon: Hannah Melgoza DPM;  [facility];  Service: Podiatry    WOUND DEBRIDEMENT Left 6/6/2022    Procedure: DEBRIDEMENT WOUND (WASH OUT);  Surgeon: Hannah Melgoza DPM;  [facility];  Service: Podiatry       Family History   Problem Relation Age of Onset    Diabetes Paternal Grandfather     Diabetes Paternal Grandmother      I have reviewed and agree with the history as documented.    E-Cigarette/Vaping    E-Cigarette Use Never User      E-Cigarette/Vaping Substances    Nicotine No     THC No     CBD No     Flavoring No     Other No     Unknown No      Social History     Tobacco Use    Smoking status: Never    Smokeless tobacco: Never   Vaping Use    Vaping status: Never Used   Substance Use Topics    Alcohol use: Yes     Alcohol/week: 1.0 standard drink of alcohol     Types: 1 Cans of beer per week     Comment: ocassional    Drug use: Never       Review of Systems   Constitutional:  Negative for chills and fever.   Gastrointestinal:  Negative for  nausea and vomiting.   Musculoskeletal:  Negative for myalgias.   Skin:  Positive for rash and wound.   Neurological:  Negative for weakness.       Physical Exam  Physical Exam  Vitals and nursing note reviewed.   Constitutional:       Appearance: Normal appearance. He is well-developed.   HENT:      Head: Atraumatic.   Eyes:      Conjunctiva/sclera: Conjunctivae normal.   Cardiovascular:      Pulses: Normal pulses.   Musculoskeletal:      Left foot: Normal range of motion. Swelling and tenderness present. No deformity. Normal pulse.        Feet:    Skin:     General: Skin is warm and dry.      Capillary Refill: Capillary refill takes less than 2 seconds.   Neurological:      Mental Status: He is alert.   Psychiatric:         Mood and Affect: Mood normal.         Vital Signs  ED Triage Vitals [04/26/24 1615]   Temperature Pulse Respirations Blood Pressure SpO2   98.2 °F (36.8 °C) 83 18 145/89 96 %      Temp Source Heart Rate Source Patient Position - Orthostatic VS BP Location FiO2 (%)   Oral -- Sitting Right arm --      Pain Score       4           Vitals:    04/26/24 1615   BP: 145/89   Pulse: 83   Patient Position - Orthostatic VS: Sitting         Visual Acuity      ED Medications  Medications - No data to display    Diagnostic Studies  Results Reviewed       Procedure Component Value Units Date/Time    Wound culture and Gram stain [[id number]] Collected: 04/26/24 1750    Lab Status: In process Specimen: Wound from Foot, Left Updated: 04/26/24 1753                   XR foot 3+ views LEFT   ED Interpretation by Marissa Campa PA-C (04/26 1751)   NAD                 Procedures  Procedures         ED Course                                             Medical Decision Making  Differential diagnosis includes but is not limited to: fb, fracture, osteomyelitis, local cellulitis    Amount and/or Complexity of Data Reviewed  External Data Reviewed: notes.     Details: Previously treated with Fluoroquinolone for foot  infection after nail puncture  Labs: ordered.  Radiology: ordered and independent interpretation performed.    Risk  Prescription drug management.             Disposition  Final diagnoses:   Puncture wound of left foot, initial encounter     Time reflects when diagnosis was documented in both MDM as applicable and the Disposition within this note       Time User Action Codes Description Comment    4/26/2024  5:52 PM Marissa Campa [S91.332A] Puncture wound of left foot, initial encounter           ED Disposition       ED Disposition   Discharge    Condition   Stable    Date/Time   Fri Apr 26, 2024  5:51 PM    Comment   Yoni Castillo discharge to home/self care.                   Follow-up Information       Follow up With Specialties Details Why Contact Info Additional Information    [facility]  [address] [address]  [phone] [facility] [address], [address]   [phone]    [facility] Emergency Department Emergency Medicine  If symptoms worsen [address] [place] [id number]  [phone] [facility] Emergency Department, [address], [place], [address]            Patient's Medications   Discharge Prescriptions    LEVOFLOXACIN (LEVAQUIN) 500 MG TABLET    Take 1 tablet (500 mg total) by mouth daily for 10 days       Start Date: 4/26/2024 End Date: 5/6/2024       Order Dose: 500 mg       Quantity: 10 tablet    Refills: 0       No discharge procedures on file.    PDMP Review         Value Time User    PDMP Reviewed  Yes 4/23/2024  9:50 AM Judd Ji MD            ED Provider  Electronically Signed by             Marissa Campa PA-C  04/26/24 9162

## 2024-04-28 LAB
BACTERIA WND AEROBE CULT: ABNORMAL
GRAM STN SPEC: ABNORMAL
GRAM STN SPEC: ABNORMAL

## 2024-05-03 ENCOUNTER — OFFICE VISIT (OUTPATIENT)
Dept: OBGYN CLINIC | Facility: CLINIC | Age: 59
End: 2024-05-03
Payer: MEDICARE

## 2024-05-03 VITALS
WEIGHT: 271 LBS | BODY MASS INDEX: 36.7 KG/M2 | DIASTOLIC BLOOD PRESSURE: 86 MMHG | SYSTOLIC BLOOD PRESSURE: 134 MMHG | HEIGHT: 72 IN

## 2024-05-03 DIAGNOSIS — M25.641 STIFFNESS OF FINGER JOINT OF RIGHT HAND: ICD-10-CM

## 2024-05-03 DIAGNOSIS — M67.40 MUCOID CYST OF JOINT: Primary | ICD-10-CM

## 2024-05-03 PROCEDURE — 99214 OFFICE O/P EST MOD 30 MIN: CPT | Performed by: ORTHOPAEDIC SURGERY

## 2024-05-03 RX ORDER — ACETAMINOPHEN 325 MG/1
975 TABLET ORAL ONCE
OUTPATIENT
Start: 2024-05-03 | End: 2024-05-03

## 2024-05-03 NOTE — PROGRESS NOTES
HAND & UPPER EXTREMITY OFFICE VISIT   Referred By:  No referring provider defined for this encounter.      Chief Complaint:     Right index and middle finger mass    Surgery:  Surgery Date: 3/25/2024 - Excision Mucoid Cyst, Index Finger Dip - Left     History of Present Illness:   Patient presents now 6 weeks status post the above surgery. he reports the finger is doing well since the procedure. He did have some increased pain last night after bumping the area. He has been using the hand to work on his cars without issues.     His primary concern today he reports more pain and increased size of the mass on his right index and middle finger DIPs. He also reports a new soreness and possible small bump on his dorsal IF and RF PIP joints. He also mentions again that he is unable to fully extend the ring finger PIP joint which is chronic in nature.     Past Medical History:  Past Medical History:   Diagnosis Date    Chronic kidney disease 2012    Chronic pain disorder     Diabetes mellitus (HCC)     H/O eye surgery     High blood sugar     Hypertension     Liver disease      Past Surgical History:   Procedure Laterality Date    GANGLION CYST EXCISION Left 3/25/2024    Procedure: EXCISION MUCOID CYST, INDEX FINGER DIP;  Surgeon: Carroll Mccarthy MD;  Location:  MAIN OR;  Service: Orthopedics    HERNIA REPAIR      INCISION AND DRAINAGE  01/16/2024    KIDNEY SURGERY      KNEE SURGERY  1980    MOUTH SURGERY      NC AMPUTATION METATARSAL W/TOE SINGLE Left 6/1/2022    Procedure: RAY RESECTION FOOT;  Surgeon: Hannah Melgoza DPM;  [facility];  Service: Podiatry    NC RPR AA HERNIA 1ST < 3 CM REDUCIBLE N/A 7/14/2023    Procedure: REPAIR HERNIA INCISIONAL;  Surgeon: Matt Melo MD;  [facility];  Service: General    WOUND DEBRIDEMENT Left 4/28/2022    Procedure: Left foot washout;  Surgeon: Hannah Melgoza DPM;  [facility];  Service: Podiatry    WOUND DEBRIDEMENT Left 6/6/2022     Procedure: DEBRIDEMENT WOUND (WASH OUT);  Surgeon: Hannah Melgoza DPM;  [facility];  Service: Podiatry     Family History   Problem Relation Age of Onset    Diabetes Paternal Grandfather     Diabetes Paternal Grandmother      Social History     Socioeconomic History    Marital status: Registered Domestic Partner     Spouse name: Not on file    Number of children: Not on file    Years of education: Not on file    Highest education level: Not on file   Occupational History    Not on file   Tobacco Use    Smoking status: Never    Smokeless tobacco: Never   Vaping Use    Vaping status: Never Used   Substance and Sexual Activity    Alcohol use: Yes     Alcohol/week: 1.0 standard drink of alcohol     Types: 1 Cans of beer per week     Comment: ocassional    Drug use: Never    Sexual activity: Not Currently     Partners: Female     Birth control/protection: Abstinence   Other Topics Concern    Not on file   Social History Narrative    Not on file     Social Determinants of Health     Financial Resource Strain: Not on file   Food Insecurity: Food Insecurity Present (1/17/2024)    Hunger Vital Sign     Worried About Running Out of Food in the Last Year: Sometimes true     Ran Out of Food in the Last Year: Sometimes true   Transportation Needs: No Transportation Needs (1/17/2024)    PRAPARE - Transportation     Lack of Transportation (Medical): No     Lack of Transportation (Non-Medical): No   Physical Activity: Not on file   Stress: Not on file   Social Connections: Not on file   Intimate Partner Violence: Not on file   Housing Stability: Low Risk  (1/17/2024)    Housing Stability Vital Sign     Unable to Pay for Housing in the Last Year: No     Number of Places Lived in the Last Year: 1     Unstable Housing in the Last Year: No     Scheduled Meds:  Continuous Infusions:No current facility-administered medications for this visit.    PRN Meds:.  Allergies   Allergen Reactions    Cephalexin Hives     Skin  peeling  Tolerates penicillins (tolerated unasyn and zosyn)    Metformin GI Intolerance       Physical Examination:    /86   Ht 6' (1.829 m)   Wt 123 kg (271 lb)   BMI 36.75 kg/m²     Gen: A&Ox3, NAD    Right Upper Extremity:  Skin CDI  Small mucous cysts present on dorsal index and long finger DIP joints.  Very small osteophytes at the index and ring finger PIP joints with no other palpable mass.   Sensation intact to light touch in the axillary median, ulnar, and radial nerve distributions  Mild stiffness of the long and ring fingers. Unable to fully extend at the ring PIP ~20 deg contractures. No palpable Dupuytren's nodules or cords.   2+RP      Left Upper Extremity:  incision healed well without signs of infection   Mild swelling of the index finger DIP  Mild TTP over the incision site  No further mass  Sensation intact to light touch in the axillary median, ulnar, and radial nerve distributions  Limited DIP ROM due to stiffness  2+RP      Studies:  5/3/24: Radiographs of the right hand, multiple views, demonstrate degenerative changes primarily at the index and middle finger DIP joints of the ring finger PIP joint there is a little prominence of the dorsal P1 head      Assessment and Plan:  1. Mucoid cyst of joint  XR hand 3+ vw right    Case request operating room: EXCISION MUCOID CYST - RIGHT INDEX AND MIDDLE FINGERS    Case request operating room: EXCISION MUCOID CYST - RIGHT INDEX AND MIDDLE FINGERS      2. Stiffness of finger joint of right hand              59 y.o. male presents 6 weeks status post Excision Mucoid Cyst, Index Finger Dip - Left. The index finger has healed well and he denies any limited ROM at the joint.     He reports the masses on the right hand have been increasingly bothersome lately. The index finger mass has been causing him the most discomfort. He is interested in having all of the masses removed. I believe the masses at the PIP joints are more attributable to underlying  arthritis and small osteophytes or dorsal bony prominence but these are very tiny and I have trouble palpating them at the areas he feels.  I would recommend continuing observation of these at this time as I do not think surgery would have significant benefit.     The index and long finger DIP masses are more consistent with mucoid cysts which are larger and would be more amenable to surgical excision. We reviewed the risks of surgery including infection, bleeding, injury to nearby structures, poor wound healing, stiffness, or mass recurrence. We reviewed the details of the procedure and the anticipated recovery period. All questions answered to patient's satisfaction. Written consent obtained in the office today.      he expressed understanding of the plan and agreed. We encouraged them to contact our office with any questions or concerns.     Mucoid cyst excision of right index and long finger DIPs  Local anesthetic    Carroll Mccarthy MD  Hand and Upper Extremity Surgery          *This note was dictated using Dragon voice recognition software. Please excuse any word substitutions or errors.*

## 2024-05-04 NOTE — H&P (VIEW-ONLY)
HAND & UPPER EXTREMITY OFFICE VISIT   Referred By:  No referring provider defined for this encounter.      Chief Complaint:     Right index and middle finger mass    Surgery:  Surgery Date: 3/25/2024 - Excision Mucoid Cyst, Index Finger Dip - Left     History of Present Illness:   Patient presents now 6 weeks status post the above surgery. he reports the finger is doing well since the procedure. He did have some increased pain last night after bumping the area. He has been using the hand to work on his cars without issues.     His primary concern today he reports more pain and increased size of the mass on his right index and middle finger DIPs. He also reports a new soreness and possible small bump on his dorsal IF and RF PIP joints. He also mentions again that he is unable to fully extend the ring finger PIP joint which is chronic in nature.     Past Medical History:  Past Medical History:   Diagnosis Date    Chronic kidney disease 2012    Chronic pain disorder     Diabetes mellitus (HCC)     H/O eye surgery     High blood sugar     Hypertension     Liver disease      Past Surgical History:   Procedure Laterality Date    GANGLION CYST EXCISION Left 3/25/2024    Procedure: EXCISION MUCOID CYST, INDEX FINGER DIP;  Surgeon: Carroll Mccarthy MD;  Location:  MAIN OR;  Service: Orthopedics    HERNIA REPAIR      INCISION AND DRAINAGE  01/16/2024    KIDNEY SURGERY      KNEE SURGERY  1980    MOUTH SURGERY      AR AMPUTATION METATARSAL W/TOE SINGLE Left 6/1/2022    Procedure: RAY RESECTION FOOT;  Surgeon: Hannah Melgoza DPM;  [facility];  Service: Podiatry    AR RPR AA HERNIA 1ST < 3 CM REDUCIBLE N/A 7/14/2023    Procedure: REPAIR HERNIA INCISIONAL;  Surgeon: Matt Melo MD;  [facility];  Service: General    WOUND DEBRIDEMENT Left 4/28/2022    Procedure: Left foot washout;  Surgeon: Hannah Melgoza DPM;  [facility];  Service: Podiatry    WOUND DEBRIDEMENT Left 6/6/2022     Procedure: DEBRIDEMENT WOUND (WASH OUT);  Surgeon: Hannah Melgoza DPM;  [facility];  Service: Podiatry     Family History   Problem Relation Age of Onset    Diabetes Paternal Grandfather     Diabetes Paternal Grandmother      Social History     Socioeconomic History    Marital status: Registered Domestic Partner     Spouse name: Not on file    Number of children: Not on file    Years of education: Not on file    Highest education level: Not on file   Occupational History    Not on file   Tobacco Use    Smoking status: Never    Smokeless tobacco: Never   Vaping Use    Vaping status: Never Used   Substance and Sexual Activity    Alcohol use: Yes     Alcohol/week: 1.0 standard drink of alcohol     Types: 1 Cans of beer per week     Comment: ocassional    Drug use: Never    Sexual activity: Not Currently     Partners: Female     Birth control/protection: Abstinence   Other Topics Concern    Not on file   Social History Narrative    Not on file     Social Determinants of Health     Financial Resource Strain: Not on file   Food Insecurity: Food Insecurity Present (1/17/2024)    Hunger Vital Sign     Worried About Running Out of Food in the Last Year: Sometimes true     Ran Out of Food in the Last Year: Sometimes true   Transportation Needs: No Transportation Needs (1/17/2024)    PRAPARE - Transportation     Lack of Transportation (Medical): No     Lack of Transportation (Non-Medical): No   Physical Activity: Not on file   Stress: Not on file   Social Connections: Not on file   Intimate Partner Violence: Not on file   Housing Stability: Low Risk  (1/17/2024)    Housing Stability Vital Sign     Unable to Pay for Housing in the Last Year: No     Number of Places Lived in the Last Year: 1     Unstable Housing in the Last Year: No     Scheduled Meds:  Continuous Infusions:No current facility-administered medications for this visit.    PRN Meds:.  Allergies   Allergen Reactions    Cephalexin Hives     Skin  peeling  Tolerates penicillins (tolerated unasyn and zosyn)    Metformin GI Intolerance       Physical Examination:    /86   Ht 6' (1.829 m)   Wt 123 kg (271 lb)   BMI 36.75 kg/m²     Gen: A&Ox3, NAD    Right Upper Extremity:  Skin CDI  Small mucous cysts present on dorsal index and long finger DIP joints.  Very small osteophytes at the index and ring finger PIP joints with no other palpable mass.   Sensation intact to light touch in the axillary median, ulnar, and radial nerve distributions  Mild stiffness of the long and ring fingers. Unable to fully extend at the ring PIP ~20 deg contractures. No palpable Dupuytren's nodules or cords.   2+RP      Left Upper Extremity:  incision healed well without signs of infection   Mild swelling of the index finger DIP  Mild TTP over the incision site  No further mass  Sensation intact to light touch in the axillary median, ulnar, and radial nerve distributions  Limited DIP ROM due to stiffness  2+RP      Studies:  5/3/24: Radiographs of the right hand, multiple views, demonstrate degenerative changes primarily at the index and middle finger DIP joints of the ring finger PIP joint there is a little prominence of the dorsal P1 head      Assessment and Plan:  1. Mucoid cyst of joint  XR hand 3+ vw right    Case request operating room: EXCISION MUCOID CYST - RIGHT INDEX AND MIDDLE FINGERS    Case request operating room: EXCISION MUCOID CYST - RIGHT INDEX AND MIDDLE FINGERS      2. Stiffness of finger joint of right hand              59 y.o. male presents 6 weeks status post Excision Mucoid Cyst, Index Finger Dip - Left. The index finger has healed well and he denies any limited ROM at the joint.     He reports the masses on the right hand have been increasingly bothersome lately. The index finger mass has been causing him the most discomfort. He is interested in having all of the masses removed. I believe the masses at the PIP joints are more attributable to underlying  arthritis and small osteophytes or dorsal bony prominence but these are very tiny and I have trouble palpating them at the areas he feels.  I would recommend continuing observation of these at this time as I do not think surgery would have significant benefit.     The index and long finger DIP masses are more consistent with mucoid cysts which are larger and would be more amenable to surgical excision. We reviewed the risks of surgery including infection, bleeding, injury to nearby structures, poor wound healing, stiffness, or mass recurrence. We reviewed the details of the procedure and the anticipated recovery period. All questions answered to patient's satisfaction. Written consent obtained in the office today.      he expressed understanding of the plan and agreed. We encouraged them to contact our office with any questions or concerns.     Mucoid cyst excision of right index and long finger DIPs  Local anesthetic    Carroll Mccarthy MD  Hand and Upper Extremity Surgery          *This note was dictated using Dragon voice recognition software. Please excuse any word substitutions or errors.*

## 2024-05-04 NOTE — H&P
HAND & UPPER EXTREMITY OFFICE VISIT   Referred By:  No referring provider defined for this encounter.      Chief Complaint:     Right index and middle finger mass    Surgery:  Surgery Date: 3/25/2024 - Excision Mucoid Cyst, Index Finger Dip - Left     History of Present Illness:   Patient presents now 6 weeks status post the above surgery. he reports the finger is doing well since the procedure. He did have some increased pain last night after bumping the area. He has been using the hand to work on his cars without issues.     His primary concern today he reports more pain and increased size of the mass on his right index and middle finger DIPs. He also reports a new soreness and possible small bump on his dorsal IF and RF PIP joints. He also mentions again that he is unable to fully extend the ring finger PIP joint which is chronic in nature.     Past Medical History:  Past Medical History:   Diagnosis Date    Chronic kidney disease 2012    Chronic pain disorder     Diabetes mellitus (HCC)     H/O eye surgery     High blood sugar     Hypertension     Liver disease      Past Surgical History:   Procedure Laterality Date    GANGLION CYST EXCISION Left 3/25/2024    Procedure: EXCISION MUCOID CYST, INDEX FINGER DIP;  Surgeon: Carroll Mccarthy MD;  Location:  MAIN OR;  Service: Orthopedics    HERNIA REPAIR      INCISION AND DRAINAGE  01/16/2024    KIDNEY SURGERY      KNEE SURGERY  1980    MOUTH SURGERY      WI AMPUTATION METATARSAL W/TOE SINGLE Left 6/1/2022    Procedure: RAY RESECTION FOOT;  Surgeon: Hannah Melgoza DPM;  [facility];  Service: Podiatry    WI RPR AA HERNIA 1ST < 3 CM REDUCIBLE N/A 7/14/2023    Procedure: REPAIR HERNIA INCISIONAL;  Surgeon: Matt Melo MD;  Location: [facility];  Service: General    WOUND DEBRIDEMENT Left 4/28/2022    Procedure: Left foot washout;  Surgeon: Hannah Melgoza DPM;  [facility];  Service: Podiatry    WOUND DEBRIDEMENT Left 6/6/2022     Procedure: DEBRIDEMENT WOUND (WASH OUT);  Surgeon: Hannah Melgoza DPM;  [facility];  Service: Podiatry     Family History   Problem Relation Age of Onset    Diabetes Paternal Grandfather     Diabetes Paternal Grandmother      Social History     Socioeconomic History    Marital status: Registered Domestic Partner     Spouse name: Not on file    Number of children: Not on file    Years of education: Not on file    Highest education level: Not on file   Occupational History    Not on file   Tobacco Use    Smoking status: Never    Smokeless tobacco: Never   Vaping Use    Vaping status: Never Used   Substance and Sexual Activity    Alcohol use: Yes     Alcohol/week: 1.0 standard drink of alcohol     Types: 1 Cans of beer per week     Comment: ocassional    Drug use: Never    Sexual activity: Not Currently     Partners: Female     Birth control/protection: Abstinence   Other Topics Concern    Not on file   Social History Narrative    Not on file     Social Determinants of Health     Financial Resource Strain: Not on file   Food Insecurity: Food Insecurity Present (1/17/2024)    Hunger Vital Sign     Worried About Running Out of Food in the Last Year: Sometimes true     Ran Out of Food in the Last Year: Sometimes true   Transportation Needs: No Transportation Needs (1/17/2024)    PRAPARE - Transportation     Lack of Transportation (Medical): No     Lack of Transportation (Non-Medical): No   Physical Activity: Not on file   Stress: Not on file   Social Connections: Not on file   Intimate Partner Violence: Not on file   Housing Stability: Low Risk  (1/17/2024)    Housing Stability Vital Sign     Unable to Pay for Housing in the Last Year: No     Number of Places Lived in the Last Year: 1     Unstable Housing in the Last Year: No     Scheduled Meds:  Continuous Infusions:No current facility-administered medications for this visit.    PRN Meds:.  Allergies   Allergen Reactions    Cephalexin Hives     Skin  peeling  Tolerates penicillins (tolerated unasyn and zosyn)    Metformin GI Intolerance       Physical Examination:    /86   Ht 6' (1.829 m)   Wt 123 kg (271 lb)   BMI 36.75 kg/m²     Gen: A&Ox3, NAD    Right Upper Extremity:  Skin CDI  Small mucous cysts present on dorsal index and long finger DIP joints.  Very small osteophytes at the index and ring finger PIP joints with no other palpable mass.   Sensation intact to light touch in the axillary median, ulnar, and radial nerve distributions  Mild stiffness of the long and ring fingers. Unable to fully extend at the ring PIP ~20 deg contractures. No palpable Dupuytren's nodules or cords.   2+RP      Left Upper Extremity:  incision healed well without signs of infection   Mild swelling of the index finger DIP  Mild TTP over the incision site  No further mass  Sensation intact to light touch in the axillary median, ulnar, and radial nerve distributions  Limited DIP ROM due to stiffness  2+RP      Studies:  5/3/24: Radiographs of the right hand, multiple views, demonstrate degenerative changes primarily at the index and middle finger DIP joints of the ring finger PIP joint there is a little prominence of the dorsal P1 head      Assessment and Plan:  1. Mucoid cyst of joint  XR hand 3+ vw right    Case request operating room: EXCISION MUCOID CYST - RIGHT INDEX AND MIDDLE FINGERS    Case request operating room: EXCISION MUCOID CYST - RIGHT INDEX AND MIDDLE FINGERS      2. Stiffness of finger joint of right hand              59 y.o. male presents 6 weeks status post Excision Mucoid Cyst, Index Finger Dip - Left. The index finger has healed well and he denies any limited ROM at the joint.     He reports the masses on the right hand have been increasingly bothersome lately. The index finger mass has been causing him the most discomfort. He is interested in having all of the masses removed. I believe the masses at the PIP joints are more attributable to underlying  arthritis and small osteophytes or dorsal bony prominence but these are very tiny and I have trouble palpating them at the areas he feels.  I would recommend continuing observation of these at this time as I do not think surgery would have significant benefit.     The index and long finger DIP masses are more consistent with mucoid cysts which are larger and would be more amenable to surgical excision. We reviewed the risks of surgery including infection, bleeding, injury to nearby structures, poor wound healing, stiffness, or mass recurrence. We reviewed the details of the procedure and the anticipated recovery period. All questions answered to patient's satisfaction. Written consent obtained in the office today.      he expressed understanding of the plan and agreed. We encouraged them to contact our office with any questions or concerns.     Mucoid cyst excision of right index and long finger DIPs  Local anesthetic    Carroll Mccarthy MD  Hand and Upper Extremity Surgery          *This note was dictated using Dragon voice recognition software. Please excuse any word substitutions or errors.*

## 2024-05-15 NOTE — PRE-PROCEDURE INSTRUCTIONS
Pre-Surgery Instructions:   Medication Instructions    acetaminophen (TYLENOL) 500 mg tablet Uses PRN- OK to take day of surgery    Alpha-Lipoic Acid 600 MG CAPS Hold day of surgery.    Apple Cider Vinegar 300 MG TABS Hold day of surgery.    Ascorbic Acid (vitamin C) 1000 MG tablet Hold day of surgery.    BENFOTIAMINE PO Hold day of surgery.    beta carotene 30 MG capsule Hold day of surgery.    carvedilol (COREG) 25 mg tablet Take day of surgery.    Chromium Picolinate 200 MCG TABS Hold day of surgery.    Empagliflozin (Jardiance) 25 MG TABS Stop taking 4 days prior to surgery.    Garlic 1200 MG CAPS Hold day of surgery.    IRON, FERROUS SULFATE, PO Hold day of surgery.    lisinopril-hydrochlorothiazide (PRINZIDE,ZESTORETIC) 20-12.5 MG per tablet Hold day of surgery.    Lutein-Bilberry (LUTEIN PLUS BILBERRY PO) Hold day of surgery.    methocarbamol (ROBAXIN) 750 mg tablet Uses PRN- OK to take day of surgery    Ozempic, 0.25 or 0.5 MG/DOSE, 2 MG/3ML injection pen Stop taking 7 days prior to surgery.    Pyridoxine HCl (B-6 PO) Take day of surgery.    traMADol (Ultram) 50 mg tablet Uses PRN- OK to take day of surgery    Turmeric (QC TUMERIC COMPLEX PO) Hold day of surgery.    vitamin B-12 (CYANOCOBALAMIN) 100 MCG TABS Hold day of surgery.    VITAMIN D PO Hold day of surgery.    vitamin E, tocopherol, 400 units capsule Hold day of surgery.    Zinc 50 MG CAPS Hold day of surgery.    The patient should have nothing to eat or drink after 11 pm the night before the MRI. The patient may take their medications with a sip of water at least 2 hours prior to their arrival time.  You will receive a call the evening before your MRI appointment with additional instructions.      Please leave your jewelry and valuables at home, wedding rings are the exception.   Please bring your physician order, insurance cards, a form of photo ID and a list of your medications with you. Arrive 15 minutes prior to your appointment time in order to  register. Please bring any prior CT or MRI studies of this area that were not performed at a [facility] facility.

## 2024-05-16 ENCOUNTER — OFFICE VISIT (OUTPATIENT)
Dept: OBGYN CLINIC | Facility: CLINIC | Age: 59
End: 2024-05-16
Payer: MEDICARE

## 2024-05-16 VITALS
DIASTOLIC BLOOD PRESSURE: 88 MMHG | HEIGHT: 72 IN | BODY MASS INDEX: 36.7 KG/M2 | WEIGHT: 271 LBS | SYSTOLIC BLOOD PRESSURE: 130 MMHG

## 2024-05-16 DIAGNOSIS — M50.30 DDD (DEGENERATIVE DISC DISEASE), CERVICAL: Primary | ICD-10-CM

## 2024-05-16 DIAGNOSIS — M51.36 DDD (DEGENERATIVE DISC DISEASE), LUMBAR: ICD-10-CM

## 2024-05-16 DIAGNOSIS — M54.12 LEFT CERVICAL RADICULOPATHY: ICD-10-CM

## 2024-05-16 PROCEDURE — 99214 OFFICE O/P EST MOD 30 MIN: CPT | Performed by: PHYSICIAN ASSISTANT

## 2024-05-16 RX ORDER — METHOCARBAMOL 750 MG/1
1500 TABLET, FILM COATED ORAL EVERY 8 HOURS SCHEDULED
Qty: 30 TABLET | Refills: 0 | Status: SHIPPED | OUTPATIENT
Start: 2024-05-16
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
## 2024-05-17 RX ORDER — ACETAMINOPHEN 500 MG
1000 TABLET ORAL EVERY 6 HOURS PRN
Qty: 60 TABLET | Refills: 0 | Status: CANCELLED | OUTPATIENT
Start: 2024-05-17

## 2024-05-17 NOTE — DISCHARGE INSTR - AVS FIRST PAGE
POST-OPERATIVE INSTRUCTIONS  Finger Mucoid Cyst Excision    You have just undergone a mucoid cyst excision of the fingers by Dr. Mccarthy in the operating room.  It is our wish that your postoperative recovery be as quick and comfortable as possible.  Please carefully review the following items that are important in your recovery.    Pain Control:  After any operation, a certain degree of pain is to be expected.  A prescription for acetaminophen and/or ibuprofen has been electronically sent to your pharmacy. Do not exceed 3000 mg of Tylenol per day. This medication will relieve most of your pain but may not relieve all of the pain. Some pain is normal post operatively.     When you go home, please keep your operated arm elevated at all times (above the level of your heart.)  If you do this, your swelling will be diminished and your pain will be diminished as well.      Dressing Care:  After surgery, make sure that your dressing is kept dry.  You can take a shower if you cover your arm with a plastic bag, such as a newspaper bag, and use tape or rubber bands. If your dressing gets wet you may take it off, place Band-Aids on the wound and re-cover it with sterile dressings which you can obtain at your local drug store.    Please remove your dressing down to the incision 3 days after your surgery. For example, if your had surgery on a Monday, do this on Thursday.  If your surgery was on Thursday, do this on Sunday.   If your dressing gets wet prior to removing it, please take if off and place something clean, or bandaids, on the wound.    Weight Bearing:  You should NOT bear weight >5lbs through your operative extremity. Do not push off using your operative extremity.     If you have a removable wrist brace you may wear that over your surgical dressing until your first follow up appointment if as you feel comfortable. It is not required to wear this splint.     It is ok to move your fingers as tolerated to prevent  stiffness. You may also use your operative hand to assist with light activities of daily living such as putting on clothes, brushing your teeth and eating as tolerated    Please work on these finger range of motions exercises between now and you follow up visit:         Follow Up:  If you don't already have a scheduled postoperative appointment, please call our office for a follow-up appointment. It is best to call the day after surgery to make an appointment in 10-14 days.  At your first postoperative visit, you will be seen by either Dr. Mccarthy, his PA; or one of their associates. The sutures will be removed and you may be asked to see a hand therapist to optimize your functional result. Each of the hand therapists that you will be referred to have received special training in the care of the hand and upper extremity.    When to Call:  It is normal to see minor staining on the hand surgery dressing after surgery. If there is significant bleeding, you are advised to call the office during regular office hours to have this checked.     If you feel that you have a surgical emergency postoperatively that requires immediate attention, please call [phone]. In addition, any emergency can also be handled by the emergency room.      If you have questions regarding your surgery postop that you feel requires attention, please call the office.   If this occurs after our regular office hours, there is a message with instructions to talk to the physician on call.

## 2024-05-19 DIAGNOSIS — M79.605 LEFT LEG PAIN: ICD-10-CM

## 2024-05-19 DIAGNOSIS — E11.69 TYPE 2 DIABETES MELLITUS WITH OTHER SPECIFIED COMPLICATION, WITHOUT LONG-TERM CURRENT USE OF INSULIN (HCC): ICD-10-CM

## 2024-05-20 ENCOUNTER — HOSPITAL ENCOUNTER (OUTPATIENT)
Age: 59
Setting detail: OUTPATIENT SURGERY
Discharge: HOME | End: 2024-05-20
Attending: ORTHOPAEDIC SURGERY | Admitting: ORTHOPAEDIC SURGERY
Payer: MEDICARE

## 2024-05-20 VITALS
OXYGEN SATURATION: 96 % | RESPIRATION RATE: 18 BRPM | SYSTOLIC BLOOD PRESSURE: 135 MMHG | BODY MASS INDEX: 35.89 KG/M2 | HEIGHT: 72 IN | TEMPERATURE: 97.8 F | HEART RATE: 58 BPM | WEIGHT: 265 LBS | DIASTOLIC BLOOD PRESSURE: 87 MMHG

## 2024-05-20 DIAGNOSIS — Z47.89 AFTERCARE FOLLOWING SURGERY OF THE MUSCULOSKELETAL SYSTEM: ICD-10-CM

## 2024-05-20 DIAGNOSIS — M67.40 MUCOID CYST OF JOINT: Primary | ICD-10-CM

## 2024-05-20 PROCEDURE — 26160 REMOVE TENDON SHEATH LESION: CPT | Performed by: ORTHOPAEDIC SURGERY

## 2024-05-20 PROCEDURE — 26160 REMOVE TENDON SHEATH LESION: CPT

## 2024-05-20 RX ORDER — ACETAMINOPHEN 500 MG
1000 TABLET ORAL EVERY 6 HOURS PRN
Qty: 60 TABLET | Refills: 0 | Status: SHIPPED | OUTPATIENT
Start: 2024-05-20

## 2024-05-20 RX ORDER — OXYCODONE HYDROCHLORIDE 5 MG/1
5 TABLET ORAL EVERY 6 HOURS PRN
Qty: 8 TABLET | Refills: 0 | Status: SHIPPED | OUTPATIENT
Start: 2024-05-20

## 2024-05-20 RX ORDER — BUPIVACAINE HYDROCHLORIDE 2.5 MG/ML
INJECTION, SOLUTION EPIDURAL; INFILTRATION; INTRACAUDAL AS NEEDED
Status: DISCONTINUED | OUTPATIENT
Start: 2024-05-20 | End: 2024-05-20 | Stop reason: HOSPADM

## 2024-05-20 RX ORDER — ACETAMINOPHEN 325 MG/1
975 TABLET ORAL ONCE
Status: COMPLETED | OUTPATIENT
Start: 2024-05-20 | End: 2024-05-20

## 2024-05-20 RX ORDER — MAGNESIUM HYDROXIDE 1200 MG/15ML
LIQUID ORAL AS NEEDED
Status: DISCONTINUED | OUTPATIENT
Start: 2024-05-20 | End: 2024-05-20 | Stop reason: HOSPADM

## 2024-05-20 RX ORDER — TRAMADOL HYDROCHLORIDE 50 MG/1
TABLET ORAL
Qty: 60 TABLET | Refills: 0 | Status: SHIPPED | OUTPATIENT
Start: 2024-05-20

## 2024-05-20 RX ORDER — ACETAMINOPHEN 325 MG/1
650 TABLET ORAL EVERY 6 HOURS PRN
Status: DISCONTINUED | OUTPATIENT
Start: 2024-05-20 | End: 2024-05-20 | Stop reason: HOSPADM

## 2024-05-20 RX ORDER — DOCUSATE SODIUM 100 MG/1
100 CAPSULE, LIQUID FILLED ORAL DAILY
Qty: 10 CAPSULE | Refills: 0 | Status: SHIPPED | OUTPATIENT
Start: 2024-05-20

## 2024-05-20 RX ADMIN — ACETAMINOPHEN 325MG 975 MG: 325 TABLET ORAL at 06:53

## 2024-05-20 RX ADMIN — LIDOCAINE HYDROCHLORIDE,EPINEPHRINE BITARTRATE: 10; .01 INJECTION, SOLUTION INFILTRATION; PERINEURAL at 06:55

## 2024-05-20 NOTE — OP NOTE
OPERATIVE REPORT  PATIENT NAME: Yoni Castillo    :  1965  MRN: [id number]  Pt Location: [facility] 03    SURGERY DATE: 2024    Surgeons and Role:     * Carroll Mccarthy MD - Primary     * Yesenia Carrasquillo PA-C - Assisting    Preop Diagnosis:  Mucoid cyst of joint [M67.40]    Post-Op Diagnosis Codes:     * Mucoid cyst of joint [M67.40]    Procedure(s):  Right - EXCISION MUCOID CYST - RIGHT INDEX AND MIDDLE FINGERS    Specimen(s):  * No specimens in log *    Estimated Blood Loss:   Minimal    Drains:  * No LDAs found *    Anesthesia Type:   Local    Operative Indications:  Mucoid cyst of joint [M67.40]    59-year-old male with right index and middle finger mucous cyst which have failed nonoperative management with activity modification.  They continue to be bothersome for him every time he bumps them and are now painful frequently throughout the day.  He did well with the previous left-sided mucous cyst excision and wishes to proceed with index and middle finger mucous cyst excisions on the right.    Operative Findings:  Mucous cyst and Heberden nodules on the radial side of the index and middle finger DIP joints with osteophyte formation.      Complications:   None    Procedure and Technique:  On the day of surgery, I met the patient in the pre-operative area. The patient was identified by name and . Once again the risks benefits and alternatives of index and middle finger mucous cyst excision were discussed with the patient. Risks included pain, stiffness, swelling, injury to neurovascular or musculoskeletal structure, need for reoperation, thromboembolic event, death, and related anesthetic complications. Benefits included overall improvement in symptoms and function.     Patient was amenable to proceed with surgery. A local injection of 50/50 solution of 1% lidocaine with epinephrine and 0.25% marcaine was performed in the pre operative area in the form of digital blocks.  he was brought to the  OR. The operative extremity was prepped and draped in a standard fashion. No pre operative antibiotic prophylaxis was indicated. A timeout was performed prior to incision according to hospital protocol.     We started with the middle finger. A finger tournicot was applied..      A  2cm L shaped incision was made with the transverse limb parallel to the DIP extension crease and across the cyst and curved longitudinal portion along the radial aspect of the digit. The dorsal skin flap was raised and the extensor tendon identified. We identified the cystic tissue along the ulnar aspect of the DIP joint, and this tissue was complete excised with the use of scalpel and fine tip rongeur. The extensor tendon was elevated to visualized the DIP joint and small osteophytes were excised with a rongeur. Clinically there was no further mass or bump on the dorsum of the finger. The tournicot was removed and we irrigated the finger. hemostasis was obtained.      We then proceeded to the index finger. A finger tournicot was applied.      A  2cm L shaped incision was made with the transverse limb parallel to the DIP extension crease and across the cyst and curved longitudinal portion along the radial aspect of the digit. The dorsal skin flap was raised and the extensor tendon identified. We identified the cystic tissue along the ulnar aspect of the DIP joint, and this tissue was complete excised with the use of scalpel and fine tip rongeur. The extensor tendon was elevated to visualized the DIP joint and small osteophytes were excised with a rongeur. Clinically there was no further mass or bump on the dorsum of the finger. The tournicot was removed and we irrigated the finger. hemostasis was obtained.     The wounds were closed with 4-0 nylon for the skin.        Sterile dressing was applied.      Post operatively the patient may use the operative extremity as tolerated for light activities. They will follow up as planned within 2  weeks for suture removal.      I was present for the entire procedure., A qualified resident physician was not available., and A physician assistant was required during the procedure for retraction, tissue handling, dissection and suturing.    Patient Disposition:  PACU         SIGNATURE: Carroll Mccarthy MD  DATE: May 20, 2024  TIME: 8:50 AM

## 2024-05-21 ENCOUNTER — TELEPHONE (OUTPATIENT)
Age: 59
End: 2024-05-21

## 2024-05-21 NOTE — TELEPHONE ENCOUNTER
Caller: ED    Doctor: Shari    Reason for call: Patient would like help with scheduling his MRI  Please advise  Call back#: [phone]

## 2024-05-23 NOTE — TELEPHONE ENCOUNTER
Patients MRI have been scheduled together on 8/30. Procedure scheduling was reaching out to him to confirm a couple of questions they said they will make him aware of the date time and location.

## 2024-05-27 DIAGNOSIS — I10 ESSENTIAL HYPERTENSION: ICD-10-CM

## 2024-05-29 RX ORDER — CARVEDILOL 25 MG/1
25 TABLET ORAL 2 TIMES DAILY WITH MEALS
Qty: 180 TABLET | Refills: 1 | Status: SHIPPED | OUTPATIENT
Start: 2024-05-29

## 2024-05-29 RX ORDER — LISINOPRIL AND HYDROCHLOROTHIAZIDE 20; 12.5 MG/1; MG/1
1 TABLET ORAL 2 TIMES DAILY
Qty: 180 TABLET | Refills: 1 | Status: SHIPPED | OUTPATIENT
Start: 2024-05-29

## 2024-05-31 DIAGNOSIS — M51.36 DDD (DEGENERATIVE DISC DISEASE), LUMBAR: ICD-10-CM

## 2024-06-05 RX ORDER — METHOCARBAMOL 750 MG/1
1500 TABLET, FILM COATED ORAL EVERY 8 HOURS SCHEDULED
Qty: 30 TABLET | Refills: 0 | Status: SHIPPED | OUTPATIENT
Start: 2024-06-05

## 2024-06-07 ENCOUNTER — OFFICE VISIT (OUTPATIENT)
Dept: OBGYN CLINIC | Facility: CLINIC | Age: 59
End: 2024-06-07

## 2024-06-07 VITALS
BODY MASS INDEX: 35.89 KG/M2 | SYSTOLIC BLOOD PRESSURE: 118 MMHG | DIASTOLIC BLOOD PRESSURE: 72 MMHG | WEIGHT: 265 LBS | HEIGHT: 72 IN

## 2024-06-07 DIAGNOSIS — M67.40 MUCOID CYST OF JOINT: Primary | ICD-10-CM

## 2024-06-07 PROCEDURE — 99024 POSTOP FOLLOW-UP VISIT: CPT | Performed by: ORTHOPAEDIC SURGERY

## 2024-06-07 NOTE — PROGRESS NOTES
HAND & UPPER EXTREMITY OFFICE VISIT   Referred By:  No referring provider defined for this encounter.      Chief Complaint:     ***    Surgery:  Excision Mucoid Cyst - Right Index And Middle Fingers - Right.    History of Present Illness:   Patient presents now *** status post the above surgery. Since last visit he reports ***.    Past Medical History:  Past Medical History:   Diagnosis Date    Chronic kidney disease 2012    Chronic pain disorder     Diabetes mellitus (HCC)     H/O eye surgery     High blood sugar     Hypertension     Liver disease      Past Surgical History:   Procedure Laterality Date    GANGLION CYST EXCISION Left 3/25/2024    Procedure: EXCISION MUCOID CYST, INDEX FINGER DIP;  Surgeon: Carroll Mccarthy MD;  [facility];  Service: Orthopedics    GANGLION CYST EXCISION Right 5/20/2024    Procedure: EXCISION MUCOID CYST - RIGHT INDEX AND MIDDLE FINGERS;  Surgeon: Carroll Mccarthy MD;  [facility];  Service: Orthopedics    HERNIA REPAIR      INCISION AND DRAINAGE  01/16/2024    KIDNEY SURGERY      KNEE SURGERY  1980    MOUTH SURGERY      RI AMPUTATION METATARSAL W/TOE SINGLE Left 6/1/2022    Procedure: RAY RESECTION FOOT;  Surgeon: Hannah Melgoza DPM;  [facility];  Service: Podiatry    RI RPR AA HERNIA 1ST < 3 CM REDUCIBLE N/A 7/14/2023    Procedure: REPAIR HERNIA INCISIONAL;  Surgeon: Matt Melo MD;  [facility];  Service: General    WOUND DEBRIDEMENT Left 4/28/2022    Procedure: Left foot washout;  Surgeon: Hannah Melgoza DPM;  [facility];  Service: Podiatry    WOUND DEBRIDEMENT Left 6/6/2022    Procedure: DEBRIDEMENT WOUND (WASH OUT);  Surgeon: Hannah Melgoza DPM;  [facility];  Service: Podiatry     Family History   Problem Relation Age of Onset    Diabetes Paternal Grandfather     Diabetes Paternal Grandmother      Social History     Socioeconomic History    Marital status: Registered Domestic Partner     Spouse  name: Not on file    Number of children: Not on file    Years of education: Not on file    Highest education level: Not on file   Occupational History    Not on file   Tobacco Use    Smoking status: Never    Smokeless tobacco: Never   Vaping Use    Vaping status: Never Used   Substance and Sexual Activity    Alcohol use: Yes     Alcohol/week: 1.0 standard drink of alcohol     Types: 1 Cans of beer per week     Comment: ocassional    Drug use: Never    Sexual activity: Not Currently     Partners: Female     Birth control/protection: Abstinence   Other Topics Concern    Not on file   Social History Narrative    Not on file     Social Determinants of Health     Financial Resource Strain: Not on file   Food Insecurity: Food Insecurity Present (1/17/2024)    Hunger Vital Sign     Worried About Running Out of Food in the Last Year: Sometimes true     Ran Out of Food in the Last Year: Sometimes true   Transportation Needs: No Transportation Needs (1/17/2024)    PRAPARE - Transportation     Lack of Transportation (Medical): No     Lack of Transportation (Non-Medical): No   Physical Activity: Not on file   Stress: Not on file   Social Connections: Not on file   Intimate Partner Violence: Not on file   Housing Stability: Low Risk  (1/17/2024)    Housing Stability Vital Sign     Unable to Pay for Housing in the Last Year: No     Number of Places Lived in the Last Year: 1     Unstable Housing in the Last Year: No     Scheduled Meds:  Continuous Infusions:No current facility-administered medications for this visit.    PRN Meds:.  Allergies   Allergen Reactions    Cephalexin Hives     Skin peeling  Tolerates penicillins (tolerated unasyn and zosyn)    Metformin GI Intolerance       Physical Examination:    /72   Ht 6' (1.829 m)   Wt 120 kg (265 lb)   BMI 35.94 kg/m²     Gen: A&Ox3, NAD    Right Upper Extremity:  Dressing clean and dry, removed  Underlying incision healing well without signs of infection  ***  ***  Sensation intact to light touch in the axillary median, ulnar, and radial nerve distributions  ***/5 motor ***  2+RP      Left Upper Extremity:  Dressing clean and dry, removed  Underlying incision healing well without signs of infection ***  ***  Sensation intact to light touch in the axillary median, ulnar, and radial nerve distributions  ***/5 motor ***  2+RP      Studies:  Radiographs: I personally reviewed and independently interpreted the available radiographs.  ***: Radiographs of the ***, multiple views, demonstrate ***.      Assessment and Plan:  No diagnosis found.    59 y.o. male presents *** status post Excision Mucoid Cyst - Right Index And Middle Fingers - Right.  ***  I recommend they follow up No follow-ups on file.  he expressed understanding of the plan and agreed. We encouraged them to contact our office with any questions or concerns.         Carroll Mccarthy MD  Hand and Upper Extremity Surgery          *This note was dictated using Dragon voice recognition software. Please excuse any word substitutions or errors.*HAND & UPPER EXTREMITY OFFICE VISIT   Referred By:  No referring provider defined for this encounter.      Chief Complaint:     ***    Surgery:  Excision Mucoid Cyst - Right Index And Middle Fingers - Right.    History of Present Illness:   Patient presents now *** status post the above surgery. Since last visit he reports ***.    Past Medical History:  Past Medical History:   Diagnosis Date    Chronic kidney disease 2012    Chronic pain disorder     Diabetes mellitus (HCC)     H/O eye surgery     High blood sugar     Hypertension     Liver disease      Past Surgical History:   Procedure Laterality Date    GANGLION CYST EXCISION Left 3/25/2024    Procedure: EXCISION MUCOID CYST, INDEX FINGER DIP;  Surgeon: Carroll Mccarthy MD;  Location:  [facility];  Service: Orthopedics    GANGLION CYST EXCISION Right 5/20/2024    Procedure: EXCISION MUCOID CYST - RIGHT INDEX AND  MIDDLE FINGERS;  Surgeon: Carroll Mccarthy MD;  [facility];  Service: Orthopedics    HERNIA REPAIR      INCISION AND DRAINAGE  01/16/2024    KIDNEY SURGERY      KNEE SURGERY  1980    MOUTH SURGERY      OH AMPUTATION METATARSAL W/TOE SINGLE Left 6/1/2022    Procedure: RAY RESECTION FOOT;  Surgeon: Hannah Melgoza DPM;  [facility];  Service: Podiatry    OH RPR AA HERNIA 1ST < 3 CM REDUCIBLE N/A 7/14/2023    Procedure: REPAIR HERNIA INCISIONAL;  Surgeon: Matt Melo MD;  [facility];  Service: General    WOUND DEBRIDEMENT Left 4/28/2022    Procedure: Left foot washout;  Surgeon: Hannah Melgoza DPM;  [facility];  Service: Podiatry    WOUND DEBRIDEMENT Left 6/6/2022    Procedure: DEBRIDEMENT WOUND (WASH OUT);  Surgeon: Hannah Melgoza DPM;  [facility];  Service: Podiatry     Family History   Problem Relation Age of Onset    Diabetes Paternal Grandfather     Diabetes Paternal Grandmother      Social History     Socioeconomic History    Marital status: Registered Domestic Partner     Spouse name: Not on file    Number of children: Not on file    Years of education: Not on file    Highest education level: Not on file   Occupational History    Not on file   Tobacco Use    Smoking status: Never    Smokeless tobacco: Never   Vaping Use    Vaping status: Never Used   Substance and Sexual Activity    Alcohol use: Yes     Alcohol/week: 1.0 standard drink of alcohol     Types: 1 Cans of beer per week     Comment: ocassional    Drug use: Never    Sexual activity: Not Currently     Partners: Female     Birth control/protection: Abstinence   Other Topics Concern    Not on file   Social History Narrative    Not on file     Social Determinants of Health     Financial Resource Strain: Not on file   Food Insecurity: Food Insecurity Present (1/17/2024)    Hunger Vital Sign     Worried About Running Out of Food in the Last Year: Sometimes true     Ran Out of Food in the Last  Year: Sometimes true   Transportation Needs: No Transportation Needs (1/17/2024)    PRAPARE - Transportation     Lack of Transportation (Medical): No     Lack of Transportation (Non-Medical): No   Physical Activity: Not on file   Stress: Not on file   Social Connections: Not on file   Intimate Partner Violence: Not on file   Housing Stability: Low Risk  (1/17/2024)    Housing Stability Vital Sign     Unable to Pay for Housing in the Last Year: No     Number of Places Lived in the Last Year: 1     Unstable Housing in the Last Year: No     Scheduled Meds:  Continuous Infusions:No current facility-administered medications for this visit.    PRN Meds:.  Allergies   Allergen Reactions    Cephalexin Hives     Skin peeling  Tolerates penicillins (tolerated unasyn and zosyn)    Metformin GI Intolerance       Physical Examination:    /72   Ht 6' (1.829 m)   Wt 120 kg (265 lb)   BMI 35.94 kg/m²     Gen: A&Ox3, NAD    Right Upper Extremity:  Dressing clean and dry, removed  Underlying incision healing well without signs of infection ***  ***  Sensation intact to light touch in the axillary median, ulnar, and radial nerve distributions  ***/5 motor ***  2+RP      Left Upper Extremity:  Dressing clean and dry, removed  Underlying incision healing well without signs of infection ***  ***  Sensation intact to light touch in the axillary median, ulnar, and radial nerve distributions  ***/5 motor ***  2+RP      Studies:  Radiographs: I personally reviewed and independently interpreted the available radiographs.  ***: Radiographs of the ***, multiple views, demonstrate ***.      Assessment and Plan:  No diagnosis found.    59 y.o. male presents *** status post Excision Mucoid Cyst - Right Index And Middle Fingers - Right.  ***  I recommend they follow up No follow-ups on file.  he expressed understanding of the plan and agreed. We encouraged them to contact our office with any questions or concerns.         Carroll Mccarthy,  MD  Hand and Upper Extremity Surgery          *This note was dictated using Dragon voice recognition software. Please excuse any word substitutions or errors.*

## 2024-06-07 NOTE — PROGRESS NOTES
HAND & UPPER EXTREMITY OFFICE VISIT   Referred By:  No referring provider defined for this encounter.      Chief Complaint:     Right index and middle finger mass     Surgery:  Excision Mucoid Cyst - Right Index And Middle Fingers - Right.    History of Present Illness:   Patient presents now 2 weeks status post the above surgery. Since last visit he reports he is doing well. He denies any pain or numbness into his finger tips .he denies any fever or chills    Past Medical History:  Past Medical History:   Diagnosis Date    Chronic kidney disease 2012    Chronic pain disorder     Diabetes mellitus (HCC)     H/O eye surgery     High blood sugar     Hypertension     Liver disease      Past Surgical History:   Procedure Laterality Date    GANGLION CYST EXCISION Left 3/25/2024    Procedure: EXCISION MUCOID CYST, INDEX FINGER DIP;  Surgeon: Carroll Mccarthy MD;  [facility];  Service: Orthopedics    GANGLION CYST EXCISION Right 5/20/2024    Procedure: EXCISION MUCOID CYST - RIGHT INDEX AND MIDDLE FINGERS;  Surgeon: Carroll Mccarthy MD;  [facility];  Service: Orthopedics    HERNIA REPAIR      INCISION AND DRAINAGE  01/16/2024    KIDNEY SURGERY      KNEE SURGERY  1980    MOUTH SURGERY      OH AMPUTATION METATARSAL W/TOE SINGLE Left 6/1/2022    Procedure: RAY RESECTION FOOT;  Surgeon: Hannah Melgoza DPM;  [facility];  Service: Podiatry    OH RPR AA HERNIA 1ST < 3 CM REDUCIBLE N/A 7/14/2023    Procedure: REPAIR HERNIA INCISIONAL;  Surgeon: Matt Melo MD;  [facility];  Service: General    WOUND DEBRIDEMENT Left 4/28/2022    Procedure: Left foot washout;  Surgeon: Hannah Melgoza DPM;  [facility];  Service: Podiatry    WOUND DEBRIDEMENT Left 6/6/2022    Procedure: DEBRIDEMENT WOUND (WASH OUT);  Surgeon: Hannah Melgoza DPM;  [facility];  Service: Podiatry     Family History   Problem Relation Age of Onset    Diabetes Paternal Grandfather      Diabetes Paternal Grandmother      Social History     Socioeconomic History    Marital status: Registered Domestic Partner     Spouse name: Not on file    Number of children: Not on file    Years of education: Not on file    Highest education level: Not on file   Occupational History    Not on file   Tobacco Use    Smoking status: Never    Smokeless tobacco: Never   Vaping Use    Vaping status: Never Used   Substance and Sexual Activity    Alcohol use: Yes     Alcohol/week: 1.0 standard drink of alcohol     Types: 1 Cans of beer per week     Comment: ocassional    Drug use: Never    Sexual activity: Not Currently     Partners: Female     Birth control/protection: Abstinence   Other Topics Concern    Not on file   Social History Narrative    Not on file     Social Determinants of Health     Financial Resource Strain: Not on file   Food Insecurity: Food Insecurity Present (1/17/2024)    Hunger Vital Sign     Worried About Running Out of Food in the Last Year: Sometimes true     Ran Out of Food in the Last Year: Sometimes true   Transportation Needs: No Transportation Needs (1/17/2024)    PRAPARE - Transportation     Lack of Transportation (Medical): No     Lack of Transportation (Non-Medical): No   Physical Activity: Not on file   Stress: Not on file   Social Connections: Not on file   Intimate Partner Violence: Not on file   Housing Stability: Low Risk  (1/17/2024)    Housing Stability Vital Sign     Unable to Pay for Housing in the Last Year: No     Number of Places Lived in the Last Year: 1     Unstable Housing in the Last Year: No     Scheduled Meds:  Continuous Infusions:No current facility-administered medications for this visit.    PRN Meds:.  Allergies   Allergen Reactions    Cephalexin Hives     Skin peeling  Tolerates penicillins (tolerated unasyn and zosyn)    Metformin GI Intolerance       Physical Examination:    /72   Ht 6' (1.829 m)   Wt 120 kg (265 lb)   BMI 35.94 kg/m²     Gen: A&Ox3,  NAD    Right Upper Extremity:  Dressing clean and dry, removed  Underlying incision healing well without signs of infection Sutures intact  Some scabbing noted of middle finger incision  Sensation intact to light touch in the axillary median, ulnar, and radial nerve distributions  motor intact  Able to make composite fist  2+RP        Studies:  No images obtained      Assessment and Plan:  1. Mucoid cyst of joint            59 y.o. male presents 2 weeks status post Excision Mucoid Cyst - Right Index And Middle Fingers - Right.  He is doing well. There is no signs of infection and no drainage is noted. He may bathe normally and he may be as active as he can tolerate.  He can follow-up in 4 weeks for a incision and range of motion check.    he expressed understanding of the plan and agreed. We encouraged them to contact our office with any questions or concerns.         Carroll Mccarthy MD  Hand and Upper Extremity Surgery          *This note was dictated using Dragon voice recognition software. Please excuse any word substitutions or errors.*

## 2024-06-12 ENCOUNTER — APPOINTMENT (OUTPATIENT)
Dept: LAB | Facility: HOSPITAL | Age: 59
End: 2024-06-12
Payer: MEDICARE

## 2024-06-12 DIAGNOSIS — E66.01 CLASS 2 SEVERE OBESITY WITH SERIOUS COMORBIDITY AND BODY MASS INDEX (BMI) OF 35.0 TO 35.9 IN ADULT, UNSPECIFIED OBESITY TYPE (HCC): ICD-10-CM

## 2024-06-12 DIAGNOSIS — G62.9 NEUROPATHY: ICD-10-CM

## 2024-06-12 DIAGNOSIS — E78.5 DYSLIPIDEMIA: ICD-10-CM

## 2024-06-12 DIAGNOSIS — Z86.31 H/O DIABETIC FOOT ULCER: ICD-10-CM

## 2024-06-12 DIAGNOSIS — I10 ESSENTIAL HYPERTENSION: ICD-10-CM

## 2024-06-12 DIAGNOSIS — N18.32 STAGE 3B CHRONIC KIDNEY DISEASE (HCC): ICD-10-CM

## 2024-06-12 DIAGNOSIS — E11.69 TYPE 2 DIABETES MELLITUS WITH OTHER SPECIFIED COMPLICATION, WITHOUT LONG-TERM CURRENT USE OF INSULIN (HCC): ICD-10-CM

## 2024-06-12 LAB
CHOLEST SERPL-MCNC: 134 MG/DL
EST. AVERAGE GLUCOSE BLD GHB EST-MCNC: 183 MG/DL
HBA1C MFR BLD: 8 %
HDLC SERPL-MCNC: 34 MG/DL
LDLC SERPL CALC-MCNC: 78 MG/DL (ref 0–100)
NONHDLC SERPL-MCNC: 100 MG/DL
TRIGL SERPL-MCNC: 109 MG/DL

## 2024-06-12 PROCEDURE — 36415 COLL VENOUS BLD VENIPUNCTURE: CPT

## 2024-06-12 PROCEDURE — 80061 LIPID PANEL: CPT

## 2024-06-12 PROCEDURE — 83036 HEMOGLOBIN GLYCOSYLATED A1C: CPT

## 2024-06-13 ENCOUNTER — OFFICE VISIT (OUTPATIENT)
Dept: ENDOCRINOLOGY | Facility: CLINIC | Age: 59
End: 2024-06-13
Payer: MEDICARE

## 2024-06-13 VITALS
WEIGHT: 264.4 LBS | SYSTOLIC BLOOD PRESSURE: 120 MMHG | HEIGHT: 72 IN | OXYGEN SATURATION: 98 % | HEART RATE: 68 BPM | DIASTOLIC BLOOD PRESSURE: 70 MMHG | BODY MASS INDEX: 35.81 KG/M2

## 2024-06-13 DIAGNOSIS — E11.22 TYPE 2 DIABETES MELLITUS WITH CHRONIC KIDNEY DISEASE, WITHOUT LONG-TERM CURRENT USE OF INSULIN, UNSPECIFIED CKD STAGE (HCC): Primary | ICD-10-CM

## 2024-06-13 DIAGNOSIS — N18.32 STAGE 3B CHRONIC KIDNEY DISEASE (HCC): ICD-10-CM

## 2024-06-13 DIAGNOSIS — I10 ESSENTIAL HYPERTENSION: ICD-10-CM

## 2024-06-13 DIAGNOSIS — E78.5 DYSLIPIDEMIA: ICD-10-CM

## 2024-06-13 PROCEDURE — 99214 OFFICE O/P EST MOD 30 MIN: CPT

## 2024-06-13 RX ORDER — DULAGLUTIDE 0.75 MG/.5ML
0.75 INJECTION, SOLUTION SUBCUTANEOUS
Qty: 2 ML | Refills: 2 | Status: SHIPPED | OUTPATIENT
Start: 2024-06-13

## 2024-06-13 NOTE — ASSESSMENT & PLAN NOTE
Lab Results   Component Value Date    EGFR 49 03/06/2024    EGFR 48 03/06/2024    EGFR 43 03/01/2024    CREATININE 1.50 (H) 03/06/2024    CREATININE 1.53 (H) 03/06/2024    CREATININE 1.66 (H) 03/01/2024   Provided patient referral to nephrology

## 2024-06-13 NOTE — ASSESSMENT & PLAN NOTE
Poorly controlled. He notes decreased appetite from Ozempic and is not eating much. There is a concern for decreased po intake. Will switch to alternative GLP1-RA to see if he tolerates better  - switch to Trulicity 0.75 mg/week  - notify for any new or worsening symptoms  - goal A1C <7%    Lab Results   Component Value Date    HGBA1C 8.0 (H) 06/12/2024
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
## 2024-06-14 ENCOUNTER — HOSPITAL ENCOUNTER (OUTPATIENT)
Dept: MRI IMAGING | Facility: HOSPITAL | Age: 59
Discharge: HOME | End: 2024-06-14

## 2024-06-18 DIAGNOSIS — M51.36 DDD (DEGENERATIVE DISC DISEASE), LUMBAR: ICD-10-CM

## 2024-06-18 DIAGNOSIS — M79.605 LEFT LEG PAIN: ICD-10-CM

## 2024-06-18 RX ORDER — TRAMADOL HYDROCHLORIDE 50 MG/1
TABLET ORAL
Qty: 60 TABLET | Refills: 0 | Status: SHIPPED | OUTPATIENT
Start: 2024-06-18

## 2024-06-18 RX ORDER — METHOCARBAMOL 750 MG/1
1500 TABLET, FILM COATED ORAL EVERY 8 HOURS SCHEDULED
Qty: 30 TABLET | Refills: 0 | Status: SHIPPED | OUTPATIENT
Start: 2024-06-18

## 2024-06-21 ENCOUNTER — OFFICE VISIT (OUTPATIENT)
Dept: PAIN MEDICINE | Facility: CLINIC | Age: 59
End: 2024-06-21
Payer: MEDICARE

## 2024-06-21 VITALS
BODY MASS INDEX: 35.89 KG/M2 | HEIGHT: 72 IN | DIASTOLIC BLOOD PRESSURE: 70 MMHG | WEIGHT: 265 LBS | SYSTOLIC BLOOD PRESSURE: 120 MMHG

## 2024-06-21 DIAGNOSIS — M54.12 CERVICAL RADICULOPATHY: Primary | ICD-10-CM

## 2024-06-21 DIAGNOSIS — M54.16 LUMBAR RADICULOPATHY: ICD-10-CM

## 2024-06-21 PROCEDURE — G2211 COMPLEX E/M VISIT ADD ON: HCPCS | Performed by: ANESTHESIOLOGY

## 2024-06-21 PROCEDURE — 99214 OFFICE O/P EST MOD 30 MIN: CPT | Performed by: ANESTHESIOLOGY

## 2024-06-21 NOTE — PROGRESS NOTES
Assessment:  1. Cervical radiculopathy    2. Lumbar radiculopathy      Patient presenting with chronic neck and low back pain with associated radiating arm and leg symptoms for greater than 1 year, worsening over the past year .  Pain is consistent with cervical and lumbar radicular pain accompanied by pain >7/10 on the pain scale with inability to participate in IADLs for >6 weeks. Patient has participated with physical therapy without benefit.  Patient is also taking Tramadol, Robaxin, Tylenol, with modest benefit. Denies any bowel or bladder incontinence, saddle anesthesia.    In regards to the patient's pathology, we discussed the various treatment options including physical therapy, chiropractic treatment, medication management, activity modifications, interventional spine procedures.  Given that patient has not had any benefit with conservative treatments, I think patient would benefit from targeted interventional treatment modalities.    Independently reviewed and interpreted cervical and lumbar MRIs.  Prior cervical MRI showed multilevel degenerative changes with herniations at C4-5, C5-6 and C6-7.  There is a component of congenital spinal stenosis as well.  Lumbar MRI showed a right-sided disc protrusion at L4-5 with displacement of the right L5 nerve root.  There is also severe left foraminal stenosis at L5-S1.    Plan:    Discussed and offered a cervical epidural steroid injection as well as a lumbar epidural steroid injection.  At this time his cervical symptoms are much worse than his lumbar symptoms.     Patient would like to proceed with C7-T1 FITZ, however he would like to first wait for an updated cervical and lumbar MRIs that is scheduled for August at the patient requires anesthesia for MRIs.    Patient will contact her office after completion of MRIs to schedule for a C7-T1 FITZ.  Risks, benefits, and alternatives to epidural steroid injections thoroughly discussed with patient.   Complete risks  and benefits including bleeding, infection, tissue reaction, nerve injury and allergic reaction were discussed. The approach was demonstrated using models and literature was provided. Verbal consent was obtained.    Reviewed external notes from PCP, orthopedic surgery in regards to recent and prior relevant medical histories, treatment recommendations, medication and/or interventional treatment responses.    Reviewed hemoglobin A1c, renal function, CBC and/or PT/INR prior to discussing/offering interventional modalities.    [place] Prescription Drug Monitoring Program report was reviewed and was appropriate     My impressions and treatment recommendations were discussed in detail with the patient who verbalized understanding and had no further questions.  Discharge instructions were provided. I personally saw and examined the patient and I agree with the above discussed plan of care.    I have spent a total time of 30 minutes on 06/21/24 in caring for this patient including Diagnostic results, Prognosis, Risks and benefits of tx options, Instructions for management, Patient and family education, Impressions, Counseling / Coordination of care, Documenting in the medical record, Reviewing / ordering tests, medicine, procedures  , and Obtaining or reviewing history  .    History of Present Illness:  Yoni Castillo is a 59 y.o. male who presents for a follow up office visit in regards to No chief complaint on file..   The patient’s current symptoms include continued neck and radiating arm pains and low back pain radiating to the legs. Pain is described as a constant aching, sharp, pressure-like shooting pain worse at night.    I have personally reviewed and/or updated the patient's past medical history, past surgical history, family history, social history, current medications, allergies, and vital signs today.     Review of Systems   Constitutional:  Negative for chills and fever.   HENT:  Negative for ear pain and  sore throat.    Eyes:  Negative for pain and visual disturbance.   Respiratory:  Negative for cough and shortness of breath.    Cardiovascular:  Negative for chest pain and palpitations.   Gastrointestinal:  Positive for nausea. Negative for abdominal pain and vomiting.   Genitourinary:  Negative for dysuria and hematuria.   Musculoskeletal:  Positive for arthralgias, back pain, gait problem, joint swelling, myalgias, neck pain and neck stiffness.   Skin:  Negative for color change and rash.   Neurological:  Positive for dizziness and weakness. Negative for seizures and syncope.   All other systems reviewed and are negative.      Patient Active Problem List   Diagnosis    Essential hypertension    Type 2 diabetes mellitus with chronic kidney disease, without long-term current use of insulin ([facility])    Class 2 severe obesity with serious comorbidity and body mass index (BMI) of 35.0 to 35.9 in adult ([facility])    Low back pain with sciatica    Cervical radiculopathy    Neuropathy    Chronic kidney disease, stage 3 ([facility])    Dyslipidemia    Arthritis    Ventral hernia without obstruction or gangrene    Incisional hernia without obstruction or gangrene    Acquired absence of other left toe(s) ([facility])    Dupuytren's disease of finger with nodules without contracture    Staghorn calculus    Mucoid cyst of joint       Past Medical History:   Diagnosis Date    Chronic kidney disease 2012    Chronic pain disorder     Diabetes mellitus ([facility])     H/O eye surgery     High blood sugar     Hypertension     Liver disease        Past Surgical History:   Procedure Laterality Date    GANGLION CYST EXCISION Left 3/25/2024    Procedure: EXCISION MUCOID CYST, INDEX FINGER DIP;  Surgeon: Carroll Mccarthy MD;  Location:  MAIN OR;  Service: Orthopedics    GANGLION CYST EXCISION Right 5/20/2024    Procedure: EXCISION MUCOID CYST - RIGHT INDEX AND MIDDLE FINGERS;  Surgeon: Carroll Mccarthy MD;  [facility];  Service:  Orthopedics    HERNIA REPAIR      INCISION AND DRAINAGE  01/16/2024    KIDNEY SURGERY      KNEE SURGERY  1980    MOUTH SURGERY      FL AMPUTATION METATARSAL W/TOE SINGLE Left 6/1/2022    Procedure: RAY RESECTION FOOT;  Surgeon: Hannah Melgoza DPM;  [facility];  Service: Podiatry    FL RPR AA HERNIA 1ST < 3 CM REDUCIBLE N/A 7/14/2023    Procedure: REPAIR HERNIA INCISIONAL;  Surgeon: Matt Melo MD;  [facility];  Service: General    WOUND DEBRIDEMENT Left 4/28/2022    Procedure: Left foot washout;  Surgeon: Hannah Melgoza DPM;  [facility];  Service: Podiatry    WOUND DEBRIDEMENT Left 6/6/2022    Procedure: DEBRIDEMENT WOUND (WASH OUT);  Surgeon: Hannah Melgoza DPM;  [facility];  Service: Podiatry       Family History   Problem Relation Age of Onset    Diabetes Paternal Grandfather     Diabetes Paternal Grandmother        Social History     Occupational History    Not on file   Tobacco Use    Smoking status: Never    Smokeless tobacco: Never   Vaping Use    Vaping status: Never Used   Substance and Sexual Activity    Alcohol use: Yes     Alcohol/week: 1.0 standard drink of alcohol     Types: 1 Cans of beer per week     Comment: ocassional    Drug use: Never    Sexual activity: Not Currently     Partners: Female     Birth control/protection: Abstinence       Current Outpatient Medications on File Prior to Visit   Medication Sig    Accu-Chek FastClix Lancets MISC Use to test blood sugar once a day    acetaminophen (TYLENOL) 500 mg tablet Take 2 tablets (1,000 mg total) by mouth every 6 (six) hours as needed for mild pain    Alpha-Lipoic Acid 600 MG CAPS Take 600 mg by mouth 2 (two) times a day      ammonium lactate (LAC-HYDRIN) 12 % lotion     Apple Cider Vinegar 300 MG TABS Take 300 mg by mouth daily      Ascorbic Acid (vitamin C) 1000 MG tablet Take 1,000 mg by mouth 2 (two) times a day 2 tablet twice a day    BENFOTIAMINE PO     beta carotene 30 MG capsule Take 30 mg  by mouth 2 (two) times a day      Blood Glucose Monitoring Suppl (Accu-Chek Guide Me) w/Device KIT Use to check blood sugar once a day    carvedilol (COREG) 25 mg tablet Take 1 tablet (25 mg total) by mouth 2 (two) times a day with meals    Chromium Picolinate 200 MCG TABS Take by mouth    docusate sodium (COLACE) 100 mg capsule Take 1 capsule (100 mg total) by mouth in the morning    dulaglutide (Trulicity) 0.75 MG/0.5ML injection Inject 0.5 mL (0.75 mg total) under the skin every 7 days    Empagliflozin (Jardiance) 25 MG TABS Take 1 tablet (25 mg total) by mouth daily    Garlic 1200 MG CAPS Take by mouth 2 (two) times a day     glucose blood (Accu-Chek Guide) test strip Use to check blood sugar once a day    IRON, FERROUS SULFATE, PO Take by mouth    ketoconazole (NIZORAL) 2 % cream     ketorolac (ACULAR) 0.5 % ophthalmic solution     lidocaine (Lidoderm) 5 % Apply 1 patch topically over 12 hours daily Remove & Discard patch within 12 hours or as directed by MD    lisinopril-hydrochlorothiazide (PRINZIDE,ZESTORETIC) 20-12.5 MG per tablet Take 1 tablet by mouth 2 (two) times a day    Lutein-Bilberry (LUTEIN PLUS BILBERRY PO) Take by mouth    methocarbamol (ROBAXIN) 750 mg tablet Take 2 tablets (1,500 mg total) by mouth every 8 (eight) hours    methylPREDNISolone 4 MG tablet therapy pack Use as directed on package    Pyridoxine HCl (B-6 PO) Take by mouth    traMADol (Ultram) 50 mg tablet 2 tablets in evening as needed for severe pain relief    Turmeric (QC TUMERIC COMPLEX PO) Take 1,000 mg by mouth once Tumeric /curcimin    vitamin B-12 (CYANOCOBALAMIN) 100 MCG TABS Take 50 mcg by mouth daily    VITAMIN D PO Take by mouth 2 (two) times a day    vitamin E, tocopherol, 400 units capsule Take 400 Units by mouth 2 (two) times a day    Zinc 50 MG CAPS Take by mouth 2 (two) times a day     oxyCODONE (Roxicodone) 5 immediate release tablet Take 1 tablet (5 mg total) by mouth every 6 (six) hours as needed for severe pain  Max Daily Amount: 20 mg (Patient not taking: Reported on 6/7/2024)     No current facility-administered medications on file prior to visit.       Allergies   Allergen Reactions    Cephalexin Hives     Skin peeling  Tolerates penicillins (tolerated unasyn and zosyn)    Metformin GI Intolerance       Physical Exam:    /70   Ht 6' (1.829 m)   Wt 120 kg (265 lb)   BMI 35.94 kg/m²     Constitutional:normal, well developed, well nourished, alert, in no distress and non-toxic and no overt pain behavior.  Eyes:anicteric  HEENT:grossly intact  Neck:supple, symmetric, trachea midline and no masses   Pulmonary:even and unlabored  Cardiovascular:No edema or pitting edema present  Skin:Normal without rashes or lesions and well hydrated  Psychiatric:Mood and affect appropriate  Neurologic: Motor function is grossly intact with no focal neurologic deficits  Musculoskeletal: Limited cervical and lumbar spine ROM    Imaging  MRI cervical spine 1/6/22:  History: Neck Pain     MRI of the cervical spine was performed on a 1.5 Tala magnet. The following   sequences were obtained: sagittal T1-weighted, T2-weighted and STIR weighted   images as well as axial MPGR and T2-weighted images. Reference is made to prior   study done 11/23/2012     FINDINGS:     Alignment is grossly unchanged with straightening of the normal lordotic curve   of the cervical spine.     The cerebellar tonsils are above the foramen magnum. There may be a mild   component of basilar invagination.     On the current study there is mild loss of disc space height at C4-5 and C5-6     There appears to be a component of congenital spinal canal stenosis.     At C2-3 there are degenerative changes of the left facet with mild left neural   foraminal stenosis.     At C3-4 there is mild disc bulging with slight degenerative changes of the   facets. There is uncovertebral spurring. These acquired degenerative changes   when superimposed on a congenitally narrowed  canal produces mild central canal   stenosis with bilateral neural foraminal stenosis.     At C4-5 there is a central to right paracentral disc herniation which effaces   the ventral thecal sac and flattens and indents the anterior aspect of the   spinal cord. The herniation extends down behind the C5 vertebral body.   Aforementioned acquired degenerative changes when superimposed on a congenitally   narrowed canal produces mild to moderate central canal stenosis. There is   bilateral uncovertebral spurring producing severe bilateral neural foraminal   stenosis.     At C5-C6 there is a asymmetric right-sided broad-based disc bulge/herniation   which extends down slightly behind the C6 vertebral body. There is right greater   than left uncovertebral spurring causing severe right greater than left neural   foraminal stenosis. There is moderate right-sided neural foraminal stenosis.   There is mild to moderate right-sided central canal stenosis.     At C6-7 there is a focal right paracentral disc herniation which effaces sac and   indents the anterior right side of spinal cord. There is right-sided central   canal stenosis.     At C7-T1 there is no canal or neural foraminal stenosis.     MRI lumbar spine 8/26/21:  History: Bilateral lumbar radiculopathy. Back pain.     Procedure: Noncontrast MRI of the lumbar spine. Sagittal T1 and STIR, sagittal   and axial T2.     Comparison: MRI of the lumbar spine of 11/23/2012.     Findings: For the purposes of this dictation, the most inferior disc will be   designated L5-S1.     Conus and lower thoracic cord: Both normal. Conus medullaris located at L1.     Marrow and Alignment: Normal marrow. No change in the mild S-shaped scoliosis.     L1-L2: Right lateral osteophytes. No change.     L2-L3 : Annular bulge. Right lateral osteophytes. No change.     L3-L4: Annular bulge. Right and left lateral osteophytes. Right greater than   left facet degeneration. No change.     L4-L5:  Annular bulge. Increase in the size of the superimposed right-sided disc   protrusion which is now associated with an annular fissure. Posterior   displacement of the right L5 nerve root in the narrowed right subarticular   recess, a finding that was not present on the prior MRI. Annular bulge.   Degenerated facet joints. Osteophytes. Mild to moderate bilateral neural   foraminal stenosis, unchanged.     L5-S1: Decrease in the size of the small central disc protrusion. Annular bulge.   Small lateral osteophytes. Degenerated facet joints. Severe left neural   foraminal stenosis, increased since the prior MRI.

## 2024-06-30 DIAGNOSIS — M54.40 LOW BACK PAIN WITH SCIATICA, SCIATICA LATERALITY UNSPECIFIED, UNSPECIFIED BACK PAIN LATERALITY, UNSPECIFIED CHRONICITY: ICD-10-CM

## 2024-07-01 RX ORDER — LIDOCAINE 50 MG/G
1 PATCH TOPICAL DAILY
Qty: 30 PATCH | Refills: 4 | Status: SHIPPED | OUTPATIENT
Start: 2024-07-01

## 2024-07-12 ENCOUNTER — OFFICE VISIT (OUTPATIENT)
Dept: OBGYN CLINIC | Facility: CLINIC | Age: 59
End: 2024-07-12

## 2024-07-12 VITALS
HEIGHT: 72 IN | SYSTOLIC BLOOD PRESSURE: 124 MMHG | BODY MASS INDEX: 35.89 KG/M2 | WEIGHT: 265 LBS | DIASTOLIC BLOOD PRESSURE: 80 MMHG

## 2024-07-12 DIAGNOSIS — M67.40 MUCOID CYST OF JOINT: Primary | ICD-10-CM

## 2024-07-12 DIAGNOSIS — Z47.89 AFTERCARE FOLLOWING SURGERY OF THE MUSCULOSKELETAL SYSTEM: ICD-10-CM

## 2024-07-12 PROCEDURE — 99024 POSTOP FOLLOW-UP VISIT: CPT | Performed by: ORTHOPAEDIC SURGERY

## 2024-07-12 NOTE — PROGRESS NOTES
HAND & UPPER EXTREMITY OFFICE VISIT   Referred By:  No referring provider defined for this encounter.      Chief Complaint:     Right index and middle finger mass     Surgery:  5/20/24 - Excision Mucoid Cyst - Right Index And Middle Fingers - Right.    History of Present Illness:   Patient presents now 7 weeks status post the above surgery. Since last visit he reports doing well. He reports the scab on his middle finger incision site finally came off about 2 weeks ago and the skin was well-healed underneath. He has minimal pain in the index and middle finger DIP joints, which is significantly improved since the surgery. He has some numbness only in the ring finger due to a previous laceration injury several years ago. He denies any other concerns.     Past Medical History:  Past Medical History:   Diagnosis Date    Chronic kidney disease 2012    Chronic pain disorder     Diabetes mellitus (HCC)     H/O eye surgery     High blood sugar     Hypertension     Liver disease      Past Surgical History:   Procedure Laterality Date    GANGLION CYST EXCISION Left 3/25/2024    Procedure: EXCISION MUCOID CYST, INDEX FINGER DIP;  Surgeon: Carroll Mccarthy MD;  [facility];  Service: Orthopedics    GANGLION CYST EXCISION Right 5/20/2024    Procedure: EXCISION MUCOID CYST - RIGHT INDEX AND MIDDLE FINGERS;  Surgeon: Carroll Mccarthy MD;  [facility];  Service: Orthopedics    HERNIA REPAIR      INCISION AND DRAINAGE  01/16/2024    KIDNEY SURGERY      KNEE SURGERY  1980    MOUTH SURGERY      IN AMPUTATION METATARSAL W/TOE SINGLE Left 6/1/2022    Procedure: RAY RESECTION FOOT;  Surgeon: Hannah Melgoza DPM;  [facility];  Service: Podiatry    IN RPR AA HERNIA 1ST < 3 CM REDUCIBLE N/A 7/14/2023    Procedure: REPAIR HERNIA INCISIONAL;  Surgeon: Matt Melo MD;  [facility];  Service: General    WOUND DEBRIDEMENT Left 4/28/2022    Procedure: Left foot washout;  Surgeon: Hannah  TRE Melgoza;  [facility];  Service: Podiatry    WOUND DEBRIDEMENT Left 6/6/2022    Procedure: DEBRIDEMENT WOUND (WASH OUT);  Surgeon: Hannah Melgoza DPM;  [facility];  Service: Podiatry     Family History   Problem Relation Age of Onset    Diabetes Paternal Grandfather     Diabetes Paternal Grandmother      Social History     Socioeconomic History    Marital status: Registered Domestic Partner     Spouse name: Not on file    Number of children: Not on file    Years of education: Not on file    Highest education level: Not on file   Occupational History    Not on file   Tobacco Use    Smoking status: Never    Smokeless tobacco: Never   Vaping Use    Vaping status: Never Used   Substance and Sexual Activity    Alcohol use: Yes     Alcohol/week: 1.0 standard drink of alcohol     Types: 1 Cans of beer per week     Comment: ocassional    Drug use: Never    Sexual activity: Not Currently     Partners: Female     Birth control/protection: Abstinence   Other Topics Concern    Not on file   Social History Narrative    Not on file     Social Determinants of Health     Financial Resource Strain: Not on file   Food Insecurity: Food Insecurity Present (1/17/2024)    Hunger Vital Sign     Worried About Running Out of Food in the Last Year: Sometimes true     Ran Out of Food in the Last Year: Sometimes true   Transportation Needs: No Transportation Needs (1/17/2024)    PRAPARE - Transportation     Lack of Transportation (Medical): No     Lack of Transportation (Non-Medical): No   Physical Activity: Not on file   Stress: Not on file   Social Connections: Not on file   Intimate Partner Violence: Not on file   Housing Stability: Low Risk  (1/17/2024)    Housing Stability Vital Sign     Unable to Pay for Housing in the Last Year: No     Number of Times Moved in the Last Year: 1     Homeless in the Last Year: No     Scheduled Meds:  Continuous Infusions:No current facility-administered medications for this  visit.    PRN Meds:.  Allergies   Allergen Reactions    Cephalexin Hives     Skin peeling  Tolerates penicillins (tolerated unasyn and zosyn)    Metformin GI Intolerance       Physical Examination:    /80   Ht 6' (1.829 m)   Wt 120 kg (265 lb)   BMI 35.94 kg/m²     Gen: A&Ox3, NAD    Right Upper Extremity:  Incision sites well-healed without evidence of infection  No recurrent cyst  Sensation intact to light touch in the axillary median, ulnar, and radial nerve distributions  5/5 motor  strength  Able to make tight composite fist and fully extend fingers  2+RP    Left upper extremity:  Incision sites well-healed without evidence of infection  No recurrent cyst  Sensation intact to light touch in the axillary median, ulnar, and radial nerve distributions  5/5 motor  strength  Able to make tight composite fist and fully extend fingers  2+RP    Studies:  No images obtained      Assessment and Plan:  1. Mucoid cyst of joint        2. Aftercare following surgery of the musculoskeletal system              59 y.o. male presents 7 weeks status post Excision Mucoid Cyst - Right Index And Middle Fingers - Right He reports doing very well since the last visit. His incision sites have healed well and he has full motion of the affected digits.  He is having also excellent relief with no issues of the left hand following his previous mucous cyst excision and more remote I&D for infection of his thenar eminence and dorsal thumb.  He may continue with all activities as tolerated. He denies any further questions or concerns at this point. He may follow up in the office as needed if he develops any additional questions or concerns.    he expressed understanding of the plan and agreed. We encouraged them to contact our office with any questions or concerns.         Carroll Mccarthy MD  Hand and Upper Extremity Surgery          *This note was dictated using Dragon voice recognition software. Please excuse any word  substitutions or errors.*

## 2024-07-15 DIAGNOSIS — M79.605 LEFT LEG PAIN: ICD-10-CM

## 2024-07-15 DIAGNOSIS — M51.36 DDD (DEGENERATIVE DISC DISEASE), LUMBAR: ICD-10-CM

## 2024-07-16 RX ORDER — TRAMADOL HYDROCHLORIDE 50 MG/1
TABLET ORAL
Qty: 60 TABLET | Refills: 0 | Status: SHIPPED | OUTPATIENT
Start: 2024-07-16

## 2024-07-17 RX ORDER — METHOCARBAMOL 750 MG/1
1500 TABLET, FILM COATED ORAL EVERY 8 HOURS SCHEDULED
Qty: 30 TABLET | Refills: 0 | Status: SHIPPED | OUTPATIENT
Start: 2024-07-17

## 2024-07-19 ENCOUNTER — TELEPHONE (OUTPATIENT)
Dept: NEPHROLOGY | Facility: CLINIC | Age: 59
End: 2024-07-19

## 2024-07-24 NOTE — PRE-PROCEDURE INSTRUCTIONS
Pre-Surgery Instructions:   Medication Instructions    acetaminophen (TYLENOL) 500 mg tablet Uses PRN- OK to take day of surgery    Alpha-Lipoic Acid 600 MG CAPS Hold day of surgery.    ammonium lactate (LAC-HYDRIN) 12 % lotion Hold day of surgery.    Apple Cider Vinegar 300 MG TABS Hold day of surgery.    Ascorbic Acid (vitamin C) 1000 MG tablet Hold day of surgery.    BENFOTIAMINE PO Hold day of surgery.    beta carotene 30 MG capsule Hold day of surgery.    carvedilol (COREG) 25 mg tablet Take day of surgery.    Chromium Picolinate 200 MCG TABS Hold day of surgery.    docusate sodium (COLACE) 100 mg capsule Hold day of surgery.    dulaglutide (Trulicity) 0.75 MG/0.5ML injection Stop taking 7 days prior to surgery. Patient did not start yet, informed if he chooses to, he needs to hold weekly dose    Empagliflozin (Jardiance) 25 MG TABS Stop taking 4 days prior to surgery.    Garlic 1200 MG CAPS Hold day of surgery.    IRON, FERROUS SULFATE, PO Hold day of surgery.    ketorolac (ACULAR) 0.5 % ophthalmic solution Take day of surgery.    lidocaine (Lidoderm) 5 % Hold day of surgery.    lisinopril-hydrochlorothiazide (PRINZIDE,ZESTORETIC) 20-12.5 MG per tablet Hold day of surgery.    Lutein-Bilberry (LUTEIN PLUS BILBERRY PO) Hold day of surgery.    methocarbamol (ROBAXIN) 750 mg tablet Uses PRN- OK to take day of surgery    methylPREDNISolone 4 MG tablet therapy pack Not taking    Pyridoxine HCl (B-6 PO) Uses PRN- OK to take day of surgery    traMADol (Ultram) 50 mg tablet Uses PRN- OK to take day of surgery    Turmeric (QC TUMERIC COMPLEX PO) Hold day of surgery.    vitamin B-12 (CYANOCOBALAMIN) 100 MCG TABS Hold day of surgery.    VITAMIN D PO Hold day of surgery.    vitamin E, tocopherol, 400 units capsule Hold day of surgery.    Zinc 50 MG CAPS Hold day of surgery.    The patient should have nothing to eat or drink after 11 pm the night before the MRI. The patient may take their medications with a sip of water at  least 2 hours prior to their arrival time.  You will receive a call the evening before your MRI appointment with additional instructions.      Please leave your jewelry and valuables at home, wedding rings are the exception.   Please bring your physician order, insurance cards, a form of photo ID and a list of your medications with you. Arrive 15 minutes prior to your appointment time in order to register. Please bring any prior CT or MRI studies of this area that were not performed at a [facility].

## 2024-08-01 ENCOUNTER — CONSULT (OUTPATIENT)
Dept: INTERNAL MEDICINE CLINIC | Facility: CLINIC | Age: 59
End: 2024-08-01
Payer: MEDICARE

## 2024-08-01 VITALS
HEART RATE: 101 BPM | HEIGHT: 72 IN | DIASTOLIC BLOOD PRESSURE: 74 MMHG | OXYGEN SATURATION: 96 % | BODY MASS INDEX: 36.52 KG/M2 | WEIGHT: 269.6 LBS | SYSTOLIC BLOOD PRESSURE: 122 MMHG

## 2024-08-01 DIAGNOSIS — G89.29 CHRONIC NONINTRACTABLE HEADACHE, UNSPECIFIED HEADACHE TYPE: ICD-10-CM

## 2024-08-01 DIAGNOSIS — R51.9 CHRONIC NONINTRACTABLE HEADACHE, UNSPECIFIED HEADACHE TYPE: ICD-10-CM

## 2024-08-01 DIAGNOSIS — Z01.818 PREOP EXAM FOR INTERNAL MEDICINE: Primary | ICD-10-CM

## 2024-08-01 PROCEDURE — G2211 COMPLEX E/M VISIT ADD ON: HCPCS | Performed by: INTERNAL MEDICINE

## 2024-08-01 PROCEDURE — 99214 OFFICE O/P EST MOD 30 MIN: CPT | Performed by: INTERNAL MEDICINE

## 2024-08-02 DIAGNOSIS — E11.22 TYPE 2 DIABETES MELLITUS WITH CHRONIC KIDNEY DISEASE, WITHOUT LONG-TERM CURRENT USE OF INSULIN, UNSPECIFIED CKD STAGE (HCC): ICD-10-CM

## 2024-08-02 DIAGNOSIS — M54.40 LOW BACK PAIN WITH SCIATICA, SCIATICA LATERALITY UNSPECIFIED, UNSPECIFIED BACK PAIN LATERALITY, UNSPECIFIED CHRONICITY: ICD-10-CM

## 2024-08-02 NOTE — PROGRESS NOTES
Ambulatory Visit  Name: Yoni Castillo      : 1965      MRN: [id number]  Encounter Provider: Judd Ji MD  Encounter Date: 2024   Encounter department: [facility] INTERNAL MEDICINE    Assessment & Plan   1. Chronic nonintractable headache, unspecified headache type  -     Ambulatory Referral to Neurology; Future  2. Preop exam for internal medicine  Assessment & Plan:  On today's examination we find the patient to be medically stable and suitable to proceed with anesthesia for his upcoming MRI scan.         History of Present Illness     This 59-year-old gentleman presents today for medical clearance prior to anesthesia for an upcoming MRI scan.  He has no significant respiratory history.  No recent infections are noted and no cardiac symptoms of chest pain palpitations or shortness of breath.  He is able to be physically active without any chest pain or palpitations.      Review of Systems   Neurological:  Positive for headaches.   All other systems reviewed and are negative.    Past Medical History:   Diagnosis Date    Chronic kidney disease 2012    Chronic pain disorder     Diabetes mellitus (HCC)     H/O eye surgery     High blood sugar     Hypertension     Liver disease      Past Surgical History:   Procedure Laterality Date    GANGLION CYST EXCISION Left 3/25/2024    Procedure: EXCISION MUCOID CYST, INDEX FINGER DIP;  Surgeon: Carroll Mccarthy MD;  [facility];  Service: Orthopedics    GANGLION CYST EXCISION Right 2024    Procedure: EXCISION MUCOID CYST - RIGHT INDEX AND MIDDLE FINGERS;  Surgeon: Carroll Mccarthy MD;  Location:  MAIN OR;  Service: Orthopedics    HERNIA REPAIR      INCISION AND DRAINAGE  2024    KIDNEY SURGERY      KNEE SURGERY  1980    MOUTH SURGERY      MI AMPUTATION METATARSAL W/TOE SINGLE Left 2022    Procedure: RAY RESECTION FOOT;  Surgeon: Hannah Melgoza DPM;  [facility];  Service: Podiatry    MI RPR KING  HERNIA 1ST < 3 CM REDUCIBLE N/A 7/14/2023    Procedure: REPAIR HERNIA INCISIONAL;  Surgeon: Matt Melo MD;  [facility];  Service: General    WOUND DEBRIDEMENT Left 4/28/2022    Procedure: Left foot washout;  Surgeon: Hannah Melgoza DPM;  [facility];  Service: Podiatry    WOUND DEBRIDEMENT Left 6/6/2022    Procedure: DEBRIDEMENT WOUND (WASH OUT);  Surgeon: Hannah Melgoza DPM;  [facility];  Service: Podiatry     Family History   Problem Relation Age of Onset    Diabetes Paternal Grandfather     Diabetes Paternal Grandmother      Social History     Tobacco Use    Smoking status: Never    Smokeless tobacco: Never   Vaping Use    Vaping status: Never Used   Substance and Sexual Activity    Alcohol use: Yes     Alcohol/week: 1.0 standard drink of alcohol     Types: 1 Cans of beer per week     Comment: ocassional    Drug use: Never    Sexual activity: Not Currently     Partners: Female     Birth control/protection: Abstinence     Current Outpatient Medications on File Prior to Visit   Medication Sig    Accu-Chek FastClix Lancets MISC Use to test blood sugar once a day    acetaminophen (TYLENOL) 500 mg tablet Take 2 tablets (1,000 mg total) by mouth every 6 (six) hours as needed for mild pain    Alpha-Lipoic Acid 600 MG CAPS Take 600 mg by mouth 2 (two) times a day      ammonium lactate (LAC-HYDRIN) 12 % lotion     Apple Cider Vinegar 300 MG TABS Take 300 mg by mouth daily      Ascorbic Acid (vitamin C) 1000 MG tablet Take 1,000 mg by mouth 2 (two) times a day 2 tablet twice a day    BENFOTIAMINE PO     beta carotene 30 MG capsule Take 30 mg by mouth 2 (two) times a day      Blood Glucose Monitoring Suppl (Accu-Chek Guide Me) w/Device KIT Use to check blood sugar once a day    carvedilol (COREG) 25 mg tablet Take 1 tablet (25 mg total) by mouth 2 (two) times a day with meals    Chromium Picolinate 200 MCG TABS Take by mouth    docusate sodium (COLACE) 100 mg capsule Take 1 capsule  (100 mg total) by mouth in the morning    dulaglutide (Trulicity) 0.75 MG/0.5ML injection Inject 0.5 mL (0.75 mg total) under the skin every 7 days    Empagliflozin (Jardiance) 25 MG TABS Take 1 tablet (25 mg total) by mouth daily    Garlic 1200 MG CAPS Take by mouth 2 (two) times a day     glucose blood (Accu-Chek Guide) test strip Use to check blood sugar once a day    IRON, FERROUS SULFATE, PO Take by mouth    ketoconazole (NIZORAL) 2 % cream     ketorolac (ACULAR) 0.5 % ophthalmic solution     lidocaine (Lidoderm) 5 % Apply 1 patch topically over 12 hours daily Remove & Discard patch within 12 hours or as directed by MD    lisinopril-hydrochlorothiazide (PRINZIDE,ZESTORETIC) 20-12.5 MG per tablet Take 1 tablet by mouth 2 (two) times a day    Lutein-Bilberry (LUTEIN PLUS BILBERRY PO) Take by mouth    methocarbamol (ROBAXIN) 750 mg tablet Take 2 tablets (1,500 mg total) by mouth every 8 (eight) hours    methylPREDNISolone 4 MG tablet therapy pack Use as directed on package    Pyridoxine HCl (B-6 PO) Take by mouth    traMADol (Ultram) 50 mg tablet 2 tablets in evening as needed for severe pain relief    Turmeric (QC TUMERIC COMPLEX PO) Take 1,000 mg by mouth once Tumeric /curcimin    vitamin B-12 (CYANOCOBALAMIN) 100 MCG TABS Take 50 mcg by mouth daily    VITAMIN D PO Take by mouth 2 (two) times a day    vitamin E, tocopherol, 400 units capsule Take 400 Units by mouth 2 (two) times a day    Zinc 50 MG CAPS Take by mouth 2 (two) times a day      Allergies   Allergen Reactions    Cephalexin Hives     Skin peeling  Tolerates penicillins (tolerated unasyn and zosyn)    Metformin GI Intolerance     Immunization History   Administered Date(s) Administered    Tdap 02/17/2022, 02/17/2022     Objective     /74   Pulse 101   Ht 6' (1.829 m)   Wt 122 kg (269 lb 9.6 oz)   SpO2 96%   BMI 36.56 kg/m²     Physical Exam  Vitals and nursing note reviewed.   Constitutional:       General: He is not in acute distress.      Appearance: He is well-developed.   HENT:      Head: Normocephalic and atraumatic.      Right Ear: Tympanic membrane, ear canal and external ear normal.      Left Ear: Tympanic membrane, ear canal and external ear normal.      Nose: Nose normal.      Mouth/Throat:      Mouth: Mucous membranes are moist.      Pharynx: Oropharynx is clear.   Eyes:      Conjunctiva/sclera: Conjunctivae normal.   Neck:      Vascular: No carotid bruit.   Cardiovascular:      Rate and Rhythm: Normal rate and regular rhythm.      Heart sounds: No murmur heard.  Pulmonary:      Effort: Pulmonary effort is normal. No respiratory distress.      Breath sounds: Normal breath sounds. No wheezing, rhonchi or rales.   Abdominal:      General: Bowel sounds are normal. There is no distension.      Palpations: Abdomen is soft. There is no mass.      Tenderness: There is no abdominal tenderness. There is no guarding.   Musculoskeletal:         General: No swelling.      Cervical back: Normal range of motion and neck supple. No rigidity or tenderness.   Lymphadenopathy:      Cervical: No cervical adenopathy.   Skin:     General: Skin is warm and dry.      Capillary Refill: Capillary refill takes less than 2 seconds.   Neurological:      Mental Status: He is alert.   Psychiatric:         Mood and Affect: Mood normal.

## 2024-08-02 NOTE — ASSESSMENT & PLAN NOTE
On today's examination we find the patient to be medically stable and suitable to proceed with anesthesia for his upcoming MRI scan.

## 2024-08-03 RX ORDER — LIDOCAINE 50 MG/G
1 PATCH TOPICAL DAILY
Qty: 30 PATCH | Refills: 0 | Status: SHIPPED | OUTPATIENT
Start: 2024-08-03

## 2024-08-04 RX ORDER — DULAGLUTIDE 0.75 MG/.5ML
0.75 INJECTION, SOLUTION SUBCUTANEOUS
Qty: 2 ML | Refills: 1 | Status: SHIPPED | OUTPATIENT
Start: 2024-08-04

## 2024-08-20 DIAGNOSIS — M79.605 LEFT LEG PAIN: ICD-10-CM

## 2024-08-20 DIAGNOSIS — E11.69 TYPE 2 DIABETES MELLITUS WITH OTHER SPECIFIED COMPLICATION, WITHOUT LONG-TERM CURRENT USE OF INSULIN (HCC): ICD-10-CM

## 2024-08-20 DIAGNOSIS — I10 ESSENTIAL HYPERTENSION: ICD-10-CM

## 2024-08-20 RX ORDER — TRAMADOL HYDROCHLORIDE 50 MG/1
TABLET ORAL
Qty: 60 TABLET | Refills: 0 | Status: SHIPPED | OUTPATIENT
Start: 2024-08-20

## 2024-08-20 RX ORDER — CARVEDILOL 25 MG/1
25 TABLET ORAL 2 TIMES DAILY WITH MEALS
Qty: 200 TABLET | Refills: 1 | Status: SHIPPED | OUTPATIENT
Start: 2024-08-20

## 2024-08-20 RX ORDER — LISINOPRIL AND HYDROCHLOROTHIAZIDE 12.5; 2 MG/1; MG/1
1 TABLET ORAL 2 TIMES DAILY
Qty: 200 TABLET | Refills: 1 | Status: SHIPPED | OUTPATIENT
Start: 2024-08-20

## 2024-08-23 ENCOUNTER — CONSULT (OUTPATIENT)
Dept: NEPHROLOGY | Facility: CLINIC | Age: 59
End: 2024-08-23
Payer: MEDICARE

## 2024-08-23 ENCOUNTER — TELEPHONE (OUTPATIENT)
Dept: NEPHROLOGY | Facility: CLINIC | Age: 59
End: 2024-08-23

## 2024-08-23 VITALS
HEIGHT: 72 IN | DIASTOLIC BLOOD PRESSURE: 84 MMHG | WEIGHT: 272 LBS | SYSTOLIC BLOOD PRESSURE: 122 MMHG | BODY MASS INDEX: 36.84 KG/M2

## 2024-08-23 DIAGNOSIS — N18.32 TYPE 2 DIABETES MELLITUS WITH STAGE 3B CHRONIC KIDNEY DISEASE, WITHOUT LONG-TERM CURRENT USE OF INSULIN (HCC): Primary | ICD-10-CM

## 2024-08-23 DIAGNOSIS — N18.32 STAGE 3B CHRONIC KIDNEY DISEASE (HCC): ICD-10-CM

## 2024-08-23 DIAGNOSIS — N20.0 STAGHORN CALCULUS: ICD-10-CM

## 2024-08-23 DIAGNOSIS — I10 ESSENTIAL HYPERTENSION: ICD-10-CM

## 2024-08-23 DIAGNOSIS — E11.22 TYPE 2 DIABETES MELLITUS WITH STAGE 3B CHRONIC KIDNEY DISEASE, WITHOUT LONG-TERM CURRENT USE OF INSULIN (HCC): Primary | ICD-10-CM

## 2024-08-23 DIAGNOSIS — E66.01 CLASS 2 SEVERE OBESITY DUE TO EXCESS CALORIES WITH SERIOUS COMORBIDITY AND BODY MASS INDEX (BMI) OF 35.0 TO 35.9 IN ADULT (HCC): ICD-10-CM

## 2024-08-23 PROCEDURE — 99204 OFFICE O/P NEW MOD 45 MIN: CPT | Performed by: INTERNAL MEDICINE

## 2024-08-23 NOTE — PATIENT INSTRUCTIONS
As we discussed in the office visit and after reviewing your previous blood test you have chronic kidney disease stage III with creatinine fluctuating around 1.3-1.6 for several years.  This is suspected secondary to diabetes, hypertension, obesity.  I would like you to go for repeat blood and urine test next month, we will contact her with the results.  If your kidney function remains stable I would like to see you back in the office in 1 year.  Continue close follow-up with your primary care doctor, endocrinologist.  We discussed about importance to have better diabetes control goal to keep her hemoglobin A1c closer below 7%.  We also discussed about importance of keep working on losing weight.  Avoid NSAIDs (no ibuprofen, Motrin, Advil, Aleve, naproxen).  Okay to take Tylenol or acetaminophen as needed for pain.  For your staghorn calculus recommend to see urology, referral placed, and we discussed about importance is to increase fluid intake to at least 2.5 L of water in a day and to follow low-salt diet.

## 2024-08-23 NOTE — PROGRESS NOTES
OFFICE CONSULT - Nephrology   Yoni Zazuetap 59 y.o. male MRN: [id number]        ASSESSMENT and PLAN:  Ed was seen today for consult and chronic kidney disease.    Diagnoses and all orders for this visit:    Type 2 diabetes mellitus with stage 3b chronic kidney disease, without long-term current use of insulin ([facility])  -     Ambulatory Referral to Nephrology  -     Renal function panel; Future  -     Albumin / creatinine urine ratio; Future  -     PTH, intact; Future    Stage 3b chronic kidney disease ([facility])  -     Ambulatory Referral to Nephrology  -     Renal function panel; Future  -     Albumin / creatinine urine ratio; Future  -     PTH, intact; Future    Essential hypertension    Staghorn calculus  -     PTH, intact; Future  -     Ambulatory Referral to Urology; Future    Class 2 severe obesity due to excess calories with serious comorbidity and body mass index (BMI) of 35.0 to 35.9 in adult ([facility])        This is a 59-year-old gentleman who was referred to our office by endocrinology for evaluation of chronic kidney disease stage III, patient with history of hypertension for more than 15 years, diabetes for more than 20 years, morbid obesity, history of nephrolithiasis with staghorn calculus, reports passed kidney stones only once several years ago, morbid obesity.    1 chronic kidney disease stage III with previous creatinine fluctuating around 1.3-1.6 back since 2021 after reviewing previous records.  Most recent creatinine 1.5 with an estimated GFR of 49 on 3/2024.  Patient with previously mild microalbuminuria.  Noted he is currently on ACE inhibitor's and SGLT2 inhibitors  Discussed with patient about importance of working on losing weight.  His blood pressure today is well-controlled.  His most recent hemoglobin A1c is 8%  Also discussed about importance of avoiding NSAIDs.  Plan to follow repeat labs next months, if renal function remains stable I would like to see him back in the office in 1 year    #2  diabetes with microalbuminuria, managed by endocrinology.  Most recent hemoglobin A1c 8%.  He is currently on Jardiance and Trulicity.  We discussed about importance to have better diabetes control goal to keep her hemoglobin A1c closer below 7%    3 hypertension, blood pressure today well-controlled.  He is currently on carvedilol as well as combination lisinopril with HCTZ.  Advised to follow low-salt diet, advised to keep working on losing weight    #4 morbid obesity, advised to lose weight    5 nephrolithiasis with staghorn calculi, referral to urology placed  Advised to increase fluid intake to at least 2.5 L of water a day and to follow low-salt diet    6  Mineral bone disease, will check calcium, phosphorus, PTH within acceptable labs.    7.  Most recent hemoglobin normal      Patient Instructions   As we discussed in the office visit and after reviewing your previous blood test you have chronic kidney disease stage III with creatinine fluctuating around 1.3-1.6 for several years.  This is suspected secondary to diabetes, hypertension, obesity.  I would like you to go for repeat blood and urine test next month, we will contact her with the results.  If your kidney function remains stable I would like to see you back in the office in 1 year.  Continue close follow-up with your primary care doctor, endocrinologist.  We discussed about importance to have better diabetes control goal to keep her hemoglobin A1c closer below 7%.  We also discussed about importance of keep working on losing weight.  Avoid NSAIDs (no ibuprofen, Motrin, Advil, Aleve, naproxen).  Okay to take Tylenol or acetaminophen as needed for pain.  For your staghorn calculus recommend to see urology, referral placed, and we discussed about importance is to increase fluid intake to at least 2.5 L of water in a day and to follow low-salt diet.          HPI:  Yoni Castillo is a 59 y.o.male who was referred by JAN Hope for evaluation of  "Consult and Chronic Kidney Disease      Patient report had right kidney \"rupture\" in 2012 and was hospitalized at [facility]   Reports have hypertension and is taking blood pressure meds for over 15 years  Reports have diabetes and is taking diabetes medication for over 12 years      Patient who presents our office after referral by endocrinology.  Patient with CKD previous creatinine fluctuating around 1.3-1.7 back since 2021.    Today in general he is doing well, denies significant complaints.  Currently denies any chest pain, no shortness of breath, no leg swelling.  Denies any abdominal pain, no recent nausea, no vomiting, no diarrhea or constipation.  Denies any urinary problems, no dysuria, no gross hematuria.  Reports he passed a kidney stone several years ago.  Reports in the past used to take NSAIDs but no for several years, currently only taking tramadol and Tylenol for pain.  Denies tobacco abuse.  Denies family history of kidney disease    I personally spent over half of a total 48 minutes face to face with the patient in counseling and discussion and/or coordination of care as described above.    ROS: All the systems were reviewed and were negative except as documented on the HPI.    Allergies: Cephalexin, Pollen extract, and Metformin    Medications:   Current Outpatient Medications:     Accu-Chek FastClix Lancets MISC, Use to test blood sugar once a day, Disp: 102 each, Rfl: 0    acetaminophen (TYLENOL) 500 mg tablet, Take 2 tablets (1,000 mg total) by mouth every 6 (six) hours as needed for mild pain (Patient taking differently: Take 2,500 mg by mouth 2 (two) times a day), Disp: 60 tablet, Rfl: 0    Acetylcysteine (NAC PO), Take 300 mg by mouth 2 (two) times a day, Disp: , Rfl:     Alpha-Lipoic Acid 600 MG CAPS, Take 600 mg by mouth 2 (two) times a day  , Disp: , Rfl:     ammonium lactate (LAC-HYDRIN) 12 % lotion, , Disp: , Rfl:     Apple Cider Vinegar 300 MG TABS, Take 300 mg by mouth 2 (two) " times a day, Disp: , Rfl:     Ascorbic Acid (vitamin C) 1000 MG tablet, Take 1,000 mg by mouth 2 (two) times a day 2 tablet twice a day, Disp: , Rfl:     BENFOTIAMINE PO, Take 300 mg by mouth 2 (two) times a day, Disp: , Rfl:     beta carotene 30 MG capsule, Take 30 mg by mouth 2 (two) times a day  , Disp: , Rfl:     Blood Glucose Monitoring Suppl (Accu-Chek Guide Me) w/Device KIT, Use to check blood sugar once a day, Disp: 1 kit, Rfl: 0    carvedilol (COREG) 25 mg tablet, Take 1 tablet (25 mg total) by mouth 2 (two) times a day with meals, Disp: 200 tablet, Rfl: 1    Chromium Picolinate 200 MCG TABS, Take 200 mcg by mouth 2 (two) times a day, Disp: , Rfl:     dulaglutide (Trulicity) 0.75 MG/0.5ML injection, Inject 0.5 mL (0.75 mg total) under the skin every 7 days, Disp: 2 mL, Rfl: 1    Empagliflozin (Jardiance) 25 MG TABS, Take 1 tablet (25 mg total) by mouth daily, Disp: 90 tablet, Rfl: 1    Garlic 1200 MG CAPS, Take by mouth 2 (two) times a day , Disp: , Rfl:     glucose blood (Accu-Chek Guide) test strip, Use to check blood sugar once a day, Disp: 100 strip, Rfl: 0    IRON, FERROUS SULFATE, PO, Take 25 mg by mouth 2 (two) times a day, Disp: , Rfl:     ketoconazole (NIZORAL) 2 % cream, , Disp: , Rfl:     lidocaine (Lidoderm) 5 %, Apply 1 patch topically over 12 hours daily Remove & Discard patch within 12 hours or as directed by MD, Disp: 30 patch, Rfl: 0    lisinopril-hydrochlorothiazide (PRINZIDE,ZESTORETIC) 20-12.5 MG per tablet, Take 1 tablet by mouth 2 (two) times a day, Disp: 200 tablet, Rfl: 1    Lutein-Bilberry (LUTEIN PLUS BILBERRY PO), Take by mouth 2 (two) times a day, Disp: , Rfl:     methocarbamol (ROBAXIN) 750 mg tablet, Take 2 tablets (1,500 mg total) by mouth every 8 (eight) hours (Patient taking differently: Take 1,500 mg by mouth every 6 (six) hours as needed), Disp: 30 tablet, Rfl: 0    NON FORMULARY, Take 150 mg by mouth 2 (two) times a day BLUEBERRY, Disp: , Rfl:     Pyridoxine HCl (B-6 PO),  Take by mouth 2 (two) times a day, Disp: , Rfl:     TART CHERRY PO, Take 150 mg by mouth 2 (two) times a day, Disp: , Rfl:     traMADol (Ultram) 50 mg tablet, 2 tablets in evening as needed for severe pain relief, Disp: 60 tablet, Rfl: 0    Turmeric (QC TUMERIC COMPLEX PO), Take 1,000 mg by mouth 2 (two) times a day Tumeric /curcimin, Disp: , Rfl:     vitamin B-12 (CYANOCOBALAMIN) 100 MCG TABS, Take 12,500 mcg by mouth 2 (two) times a day, Disp: , Rfl:     VITAMIN D PO, Take by mouth 2 (two) times a day, Disp: , Rfl:     vitamin E, tocopherol, 400 units capsule, Take 400 Units by mouth 2 (two) times a day, Disp: , Rfl:     Zinc 50 MG CAPS, Take by mouth 2 (two) times a day , Disp: , Rfl:     docusate sodium (COLACE) 100 mg capsule, Take 1 capsule (100 mg total) by mouth in the morning (Patient not taking: Reported on 8/23/2024), Disp: 10 capsule, Rfl: 0    ketorolac (ACULAR) 0.5 % ophthalmic solution, , Disp: , Rfl:     methylPREDNISolone 4 MG tablet therapy pack, Use as directed on package (Patient not taking: Reported on 8/23/2024), Disp: 21 each, Rfl: 0    Past Medical History:   Diagnosis Date    Chronic kidney disease 2012    Chronic pain disorder     Diabetes mellitus (HCC)     H/O eye surgery     High blood sugar     Hypertension     Kidney stone     Liver disease      Past Surgical History:   Procedure Laterality Date    GANGLION CYST EXCISION Left 3/25/2024    Procedure: EXCISION MUCOID CYST, INDEX FINGER DIP;  Surgeon: Carroll Mccarthy MD;  [facility];  Service: Orthopedics    GANGLION CYST EXCISION Right 5/20/2024    Procedure: EXCISION MUCOID CYST - RIGHT INDEX AND MIDDLE FINGERS;  Surgeon: Carroll Mccarthy MD;  [facility];  Service: Orthopedics    HERNIA REPAIR      INCISION AND DRAINAGE  01/16/2024    KIDNEY SURGERY      KNEE SURGERY  1980    MOUTH SURGERY      CT AMPUTATION METATARSAL W/TOE SINGLE Left 6/1/2022    Procedure: RAY RESECTION FOOT;  Surgeon: Hannah Melgoza,  TRE;  [facility];  Service: Podiatry    DC RPR AA HERNIA 1ST < 3 CM REDUCIBLE N/A 7/14/2023    Procedure: REPAIR HERNIA INCISIONAL;  Surgeon: Matt Melo MD;  [facility];  Service: General    WOUND DEBRIDEMENT Left 4/28/2022    Procedure: Left foot washout;  Surgeon: Hannah Melgoza DPM;  [facility];  Service: Podiatry    WOUND DEBRIDEMENT Left 6/6/2022    Procedure: DEBRIDEMENT WOUND (WASH OUT);  Surgeon: Hannah Melgoza DPM;  [facility];  Service: Podiatry     Family History   Problem Relation Age of Onset    Diabetes Paternal Grandmother     Diabetes Paternal Grandfather       reports that he has never smoked. He has never used smokeless tobacco. He reports current alcohol use of about 1.0 standard drink of alcohol per week. He reports that he does not use drugs.      Physical Exam:   Vitals:    08/23/24 1506   BP: 122/84   BP Location: Left arm   Patient Position: Sitting   Cuff Size: Large   Weight: 123 kg (272 lb)   Height: 6' (1.829 m)     Body mass index is 36.89 kg/m².    General: Morbid obese, conscious, cooperative, in not acute distress  Eyes: conjunctivae pink, anicteric sclerae  ENT: lips and mucous membranes moist  Neck: supple, no JVD  Chest: clear breath sounds bilateral, no crackles, ronchus or wheezings  CVS: distinct S1 & S2, normal rate, regular rhythm  Abdomen: soft, non-tender, non-distended, normoactive bowel sounds  Back: no CVA tenderness  Extremities: no edema of both legs  Skin: no rash  Neuro: awake, alert, oriented      Lab Results:   Results for orders placed or performed in visit on 06/12/24   Lipid panel   Result Value Ref Range    Cholesterol 134 See Comment mg/dL    Triglycerides 109 See Comment mg/dL    HDL, Direct 34 (L) >=40 mg/dL    LDL Calculated 78 0 - 100 mg/dL    Non-HDL-Chol (CHOL-HDL) 100 mg/dl   Hemoglobin A1C   Result Value Ref Range    Hemoglobin A1C 8.0 (H) Normal 4.0-5.6%; PreDiabetic 5.7-6.4%; Diabetic >=6.5%;  "Glycemic control for adults with diabetes <7.0% %     mg/dl     Laboratory Results:  Lab Results   Component Value Date    WBC 5.16 03/06/2024    HGB 15.1 03/06/2024    HCT 44.3 03/06/2024    MCV 90 03/06/2024     03/06/2024     Lab Results   Component Value Date    SODIUM 133 (L) 03/06/2024    K 4.7 03/06/2024     03/06/2024    CO2 24 03/06/2024    BUN 34 (H) 03/06/2024    CREATININE 1.50 (H) 03/06/2024    GLUC 138 03/06/2024    CALCIUM 10.1 03/06/2024               Portions of the record may have been created with voice recognition software. Occasional wrong word or \"sound a like\" substitutions may have occurred due to the inherent limitations of voice recognition software. Read the chart carefully and recognize, using context, where substitutions have occurred.If you have any questions, please contact the dictating provider.  "

## 2024-08-29 ENCOUNTER — TELEPHONE (OUTPATIENT)
Age: 59
End: 2024-08-29

## 2024-08-29 NOTE — TELEPHONE ENCOUNTER
New Patient    What is the reason for the patient’s appointment?:patient called stating he is having pain on right side. Patient does have a history of kidney stones.   Patient had ct scan showed      Stable multiple nonobstructing right renal calculi, including the 2.8 cm staghorn calculus, without hydroureteronephrosis. No left renal calculi or hydronephrosis.   Patient scheduled appt for 10/10/24/ at 10 am with Jeny    What office location does the patient prefer?: [place]     Does patient have Imaging/Lab Results:    Have patient records been requested?:  If No, are the records showing in Epic:       INSURANCE:   Do we accept the patient's insurance or is the patient Self-Pay?:    Insurance Provider:medicare /[employer]  Plan Type/Number:   Member ID#:       HISTORY:   Has the patient had any previous Urologist(s)?:    Was the patient seen in the ED?:    Has the patient had any outside testing done?:    Does the patient have a personal history of cancer?:no

## 2024-08-30 ENCOUNTER — ANESTHESIA (OUTPATIENT)
Dept: MRI IMAGING | Facility: HOSPITAL | Age: 59
End: 2024-08-30

## 2024-08-30 ENCOUNTER — ANESTHESIA EVENT (OUTPATIENT)
Dept: MRI IMAGING | Facility: HOSPITAL | Age: 59
End: 2024-08-30

## 2024-08-30 ENCOUNTER — HOSPITAL ENCOUNTER (OUTPATIENT)
Dept: MRI IMAGING | Facility: HOSPITAL | Age: 59
End: 2024-08-30
Payer: MEDICARE

## 2024-08-30 VITALS
RESPIRATION RATE: 12 BRPM | OXYGEN SATURATION: 97 % | SYSTOLIC BLOOD PRESSURE: 158 MMHG | TEMPERATURE: 97.1 F | DIASTOLIC BLOOD PRESSURE: 99 MMHG | HEART RATE: 68 BPM

## 2024-08-30 DIAGNOSIS — M50.30 DDD (DEGENERATIVE DISC DISEASE), CERVICAL: ICD-10-CM

## 2024-08-30 DIAGNOSIS — M54.12 LEFT CERVICAL RADICULOPATHY: ICD-10-CM

## 2024-08-30 DIAGNOSIS — M54.16 LEFT LUMBAR RADICULOPATHY: ICD-10-CM

## 2024-08-30 LAB — GLUCOSE SERPL-MCNC: 117 MG/DL (ref 65–140)

## 2024-08-30 PROCEDURE — 72141 MRI NECK SPINE W/O DYE: CPT

## 2024-08-30 PROCEDURE — 82948 REAGENT STRIP/BLOOD GLUCOSE: CPT

## 2024-08-30 PROCEDURE — 72148 MRI LUMBAR SPINE W/O DYE: CPT

## 2024-08-30 RX ORDER — KETAMINE HYDROCHLORIDE 50 MG/ML
INJECTION, SOLUTION INTRAMUSCULAR; INTRAVENOUS AS NEEDED
Status: DISCONTINUED | OUTPATIENT
Start: 2024-08-30 | End: 2024-08-30

## 2024-08-30 RX ORDER — MIDAZOLAM HYDROCHLORIDE 2 MG/2ML
INJECTION, SOLUTION INTRAMUSCULAR; INTRAVENOUS AS NEEDED
Status: DISCONTINUED | OUTPATIENT
Start: 2024-08-30 | End: 2024-08-30

## 2024-08-30 RX ORDER — FENTANYL CITRATE 50 UG/ML
INJECTION, SOLUTION INTRAMUSCULAR; INTRAVENOUS AS NEEDED
Status: DISCONTINUED | OUTPATIENT
Start: 2024-08-30 | End: 2024-08-30

## 2024-08-30 RX ORDER — SODIUM CHLORIDE, SODIUM LACTATE, POTASSIUM CHLORIDE, CALCIUM CHLORIDE 600; 310; 30; 20 MG/100ML; MG/100ML; MG/100ML; MG/100ML
INJECTION, SOLUTION INTRAVENOUS CONTINUOUS PRN
Status: DISCONTINUED | OUTPATIENT
Start: 2024-08-30 | End: 2024-08-30

## 2024-08-30 RX ADMIN — MIDAZOLAM 2 MG: 1 INJECTION INTRAMUSCULAR; INTRAVENOUS at 07:49

## 2024-08-30 RX ADMIN — FENTANYL CITRATE 25 MCG: 50 INJECTION, SOLUTION INTRAMUSCULAR; INTRAVENOUS at 07:31

## 2024-08-30 RX ADMIN — MIDAZOLAM 2 MG: 1 INJECTION INTRAMUSCULAR; INTRAVENOUS at 07:27

## 2024-08-30 RX ADMIN — SODIUM CHLORIDE, SODIUM LACTATE, POTASSIUM CHLORIDE, AND CALCIUM CHLORIDE: .6; .31; .03; .02 INJECTION, SOLUTION INTRAVENOUS at 08:35

## 2024-08-30 RX ADMIN — DEXMEDETOMIDINE 20 MCG: 100 INJECTION, SOLUTION, CONCENTRATE INTRAVENOUS at 07:26

## 2024-08-30 RX ADMIN — KETAMINE HYDROCHLORIDE 20 MG: 50 INJECTION INTRAMUSCULAR; INTRAVENOUS at 07:31

## 2024-08-30 RX ADMIN — FENTANYL CITRATE 25 MCG: 50 INJECTION, SOLUTION INTRAMUSCULAR; INTRAVENOUS at 07:49

## 2024-08-30 RX ADMIN — SODIUM CHLORIDE, SODIUM LACTATE, POTASSIUM CHLORIDE, AND CALCIUM CHLORIDE: .6; .31; .03; .02 INJECTION, SOLUTION INTRAVENOUS at 07:26

## 2024-08-30 NOTE — ANESTHESIA POSTPROCEDURE EVALUATION
Post-Op Assessment Note    CV Status:  Stable  Pain Score: 0    Pain management: adequate       Mental Status:  Alert and awake   Hydration Status:  Euvolemic   PONV Controlled:  Controlled   Airway Patency:  Patent     Post Op Vitals Reviewed: Yes    No anethesia notable event occurred.    Staff: CRNA               BP   134/86   Temp   97.6   Pulse  87   Resp   16   SpO2   97

## 2024-08-30 NOTE — ANESTHESIA PREPROCEDURE EVALUATION
Procedure:  MRI LUMBAR SPINE WO CONTRAST    Relevant Problems   CARDIO   (+) Essential hypertension      ENDO   (+) Type 2 diabetes mellitus with chronic kidney disease, without long-term current use of insulin (HCC)      /RENAL   (+) Chronic kidney disease, stage 3 ([facility])   (+) Staghorn calculus      MUSCULOSKELETAL   (+) Arthritis   (+) Low back pain with sciatica      Other   (+) Class 2 severe obesity with serious comorbidity and body mass index (BMI) of 35.0 to 35.9 in adult ([facility])        Physical Exam    Airway    Mallampati score: I  TM Distance: >3 FB  Neck ROM: full     Dental       Cardiovascular  Cardiovascular exam normal    Pulmonary  Pulmonary exam normal     Other Findings        Anesthesia Plan  ASA Score- 3     Anesthesia Type- IV sedation with anesthesia with ASA Monitors.         Additional Monitors:     Airway Plan:            Plan Factors-Exercise tolerance (METS): >4 METS.    Chart reviewed. EKG reviewed. Imaging results reviewed. Existing labs reviewed. Patient summary reviewed.                  Induction- intravenous.    Postoperative Plan- Plan for postoperative opioid use. Planned trial extubation        Informed Consent- Anesthetic plan and risks discussed with patient.  I personally reviewed this patient with the CRNA. Discussed and agreed on the Anesthesia Plan with the CRNA..

## 2024-09-08 DIAGNOSIS — E11.22 TYPE 2 DIABETES MELLITUS WITH CHRONIC KIDNEY DISEASE, WITHOUT LONG-TERM CURRENT USE OF INSULIN, UNSPECIFIED CKD STAGE (HCC): ICD-10-CM

## 2024-09-08 DIAGNOSIS — M51.36 DDD (DEGENERATIVE DISC DISEASE), LUMBAR: ICD-10-CM

## 2024-09-08 RX ORDER — DULAGLUTIDE 0.75 MG/.5ML
0.75 INJECTION, SOLUTION SUBCUTANEOUS
Qty: 2 ML | Refills: 5 | Status: SHIPPED | OUTPATIENT
Start: 2024-09-08

## 2024-09-09 ENCOUNTER — TELEPHONE (OUTPATIENT)
Age: 59
End: 2024-09-09

## 2024-09-09 RX ORDER — METHOCARBAMOL 750 MG/1
1500 TABLET, FILM COATED ORAL EVERY 8 HOURS SCHEDULED
Qty: 30 TABLET | Refills: 0 | Status: SHIPPED | OUTPATIENT
Start: 2024-09-09

## 2024-09-09 NOTE — TELEPHONE ENCOUNTER
Spoke to patient regarding Methocarbamol (Robaxin) Rx refill. Informed him that refill was approved and sent to pharmacy.

## 2024-09-11 ENCOUNTER — APPOINTMENT (EMERGENCY)
Dept: RADIOLOGY | Facility: HOSPITAL | Age: 59
DRG: 501 | End: 2024-09-11
Payer: MEDICARE

## 2024-09-11 ENCOUNTER — HOSPITAL ENCOUNTER (INPATIENT)
Facility: HOSPITAL | Age: 59
LOS: 9 days | Discharge: HOME | DRG: 501 | End: 2024-09-21
Attending: EMERGENCY MEDICINE | Admitting: INTERNAL MEDICINE
Payer: MEDICARE

## 2024-09-11 ENCOUNTER — APPOINTMENT (OUTPATIENT)
Dept: CT IMAGING | Facility: HOSPITAL | Age: 59
DRG: 501 | End: 2024-09-11
Payer: MEDICARE
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
## 2024-09-11 DIAGNOSIS — N20.0 STAGHORN CALCULUS: ICD-10-CM

## 2024-09-11 DIAGNOSIS — L03.113 CELLULITIS OF HAND, RIGHT: ICD-10-CM

## 2024-09-11 LAB
ALBUMIN SERPL BCG-MCNC: 4.5 G/DL (ref 3.5–5)
ALP SERPL-CCNC: 42 U/L (ref 34–104)
ALT SERPL W P-5'-P-CCNC: 19 U/L (ref 7–52)
ANION GAP SERPL CALCULATED.3IONS-SCNC: 7 MMOL/L (ref 4–13)
AST SERPL W P-5'-P-CCNC: 17 U/L (ref 13–39)
BASOPHILS # BLD AUTO: 0.04 THOUSANDS/ΜL (ref 0–0.1)
BASOPHILS NFR BLD AUTO: 0 % (ref 0–1)
BILIRUB SERPL-MCNC: 0.6 MG/DL (ref 0.2–1)
BUN SERPL-MCNC: 24 MG/DL (ref 5–25)
CALCIUM SERPL-MCNC: 9.6 MG/DL (ref 8.4–10.2)
CHLORIDE SERPL-SCNC: 99 MMOL/L (ref 96–108)
CO2 SERPL-SCNC: 26 MMOL/L (ref 21–32)
CREAT SERPL-MCNC: 1.34 MG/DL (ref 0.6–1.3)
EOSINOPHIL # BLD AUTO: 0.15 THOUSAND/ΜL (ref 0–0.61)
EOSINOPHIL NFR BLD AUTO: 2 % (ref 0–6)
ERYTHROCYTE [DISTWIDTH] IN BLOOD BY AUTOMATED COUNT: 13.4 % (ref 11.6–15.1)
GFR SERPL CREATININE-BSD FRML MDRD: 56 ML/MIN/1.73SQ M
GLUCOSE SERPL-MCNC: 156 MG/DL (ref 65–140)
GLUCOSE SERPL-MCNC: 179 MG/DL (ref 65–140)
HCT VFR BLD AUTO: 38.6 % (ref 36.5–49.3)
HGB BLD-MCNC: 13 G/DL (ref 12–17)
IMM GRANULOCYTES # BLD AUTO: 0.03 THOUSAND/UL (ref 0–0.2)
IMM GRANULOCYTES NFR BLD AUTO: 0 % (ref 0–2)
LACTATE SERPL-SCNC: 1 MMOL/L (ref 0.5–2)
LYMPHOCYTES # BLD AUTO: 0.81 THOUSANDS/ΜL (ref 0.6–4.47)
LYMPHOCYTES NFR BLD AUTO: 9 % (ref 14–44)
MAGNESIUM SERPL-MCNC: 2.1 MG/DL (ref 1.9–2.7)
MCH RBC QN AUTO: 30.9 PG (ref 26.8–34.3)
MCHC RBC AUTO-ENTMCNC: 33.7 G/DL (ref 31.4–37.4)
MCV RBC AUTO: 92 FL (ref 82–98)
MONOCYTES # BLD AUTO: 0.94 THOUSAND/ΜL (ref 0.17–1.22)
MONOCYTES NFR BLD AUTO: 10 % (ref 4–12)
NEUTROPHILS # BLD AUTO: 7.15 THOUSANDS/ΜL (ref 1.85–7.62)
NEUTS SEG NFR BLD AUTO: 79 % (ref 43–75)
NRBC BLD AUTO-RTO: 0 /100 WBCS
PLATELET # BLD AUTO: 211 THOUSANDS/UL (ref 149–390)
PMV BLD AUTO: 9.7 FL (ref 8.9–12.7)
POTASSIUM SERPL-SCNC: 4.4 MMOL/L (ref 3.5–5.3)
PROT SERPL-MCNC: 7.7 G/DL (ref 6.4–8.4)
RBC # BLD AUTO: 4.21 MILLION/UL (ref 3.88–5.62)
SODIUM SERPL-SCNC: 132 MMOL/L (ref 135–147)
WBC # BLD AUTO: 9.12 THOUSAND/UL (ref 4.31–10.16)

## 2024-09-11 PROCEDURE — 96374 THER/PROPH/DIAG INJ IV PUSH: CPT

## 2024-09-11 PROCEDURE — 73130 X-RAY EXAM OF HAND: CPT

## 2024-09-11 PROCEDURE — 83735 ASSAY OF MAGNESIUM: CPT | Performed by: EMERGENCY MEDICINE

## 2024-09-11 PROCEDURE — 83605 ASSAY OF LACTIC ACID: CPT | Performed by: EMERGENCY MEDICINE

## 2024-09-11 PROCEDURE — 85025 COMPLETE CBC W/AUTO DIFF WBC: CPT | Performed by: EMERGENCY MEDICINE

## 2024-09-11 PROCEDURE — 99283 EMERGENCY DEPT VISIT LOW MDM: CPT

## 2024-09-11 PROCEDURE — 73201 CT UPPER EXTREMITY W/DYE: CPT

## 2024-09-11 PROCEDURE — 99223 1ST HOSP IP/OBS HIGH 75: CPT | Performed by: INTERNAL MEDICINE

## 2024-09-11 PROCEDURE — 36415 COLL VENOUS BLD VENIPUNCTURE: CPT | Performed by: EMERGENCY MEDICINE

## 2024-09-11 PROCEDURE — 82948 REAGENT STRIP/BLOOD GLUCOSE: CPT

## 2024-09-11 PROCEDURE — 80053 COMPREHEN METABOLIC PANEL: CPT | Performed by: EMERGENCY MEDICINE

## 2024-09-11 PROCEDURE — 99285 EMERGENCY DEPT VISIT HI MDM: CPT | Performed by: EMERGENCY MEDICINE

## 2024-09-11 RX ORDER — MORPHINE SULFATE 4 MG/ML
4 INJECTION, SOLUTION INTRAMUSCULAR; INTRAVENOUS EVERY 6 HOURS PRN
Status: DISCONTINUED | OUTPATIENT
Start: 2024-09-11 | End: 2024-09-19

## 2024-09-11 RX ORDER — PERPHENAZINE 16 MG
600 TABLET ORAL 2 TIMES DAILY
Status: DISCONTINUED | OUTPATIENT
Start: 2024-09-11 | End: 2024-09-11

## 2024-09-11 RX ORDER — ACETAMINOPHEN 325 MG/1
975 TABLET ORAL EVERY 6 HOURS SCHEDULED
Status: DISCONTINUED | OUTPATIENT
Start: 2024-09-11 | End: 2024-09-21 | Stop reason: HOSPADM

## 2024-09-11 RX ORDER — OXYCODONE HYDROCHLORIDE 5 MG/1
5 TABLET ORAL EVERY 4 HOURS PRN
Status: DISCONTINUED | OUTPATIENT
Start: 2024-09-11 | End: 2024-09-11

## 2024-09-11 RX ORDER — CIDER VINEGAR 300 MG
300 TABLET ORAL 2 TIMES DAILY
Status: DISCONTINUED | OUTPATIENT
Start: 2024-09-11 | End: 2024-09-11

## 2024-09-11 RX ORDER — OXYCODONE HYDROCHLORIDE 5 MG/1
5 TABLET ORAL EVERY 4 HOURS PRN
Status: DISCONTINUED | OUTPATIENT
Start: 2024-09-11 | End: 2024-09-19

## 2024-09-11 RX ORDER — CARVEDILOL 25 MG/1
25 TABLET ORAL 2 TIMES DAILY WITH MEALS
Status: DISCONTINUED | OUTPATIENT
Start: 2024-09-12 | End: 2024-09-21 | Stop reason: HOSPADM

## 2024-09-11 RX ORDER — ASCORBIC ACID 500 MG
1000 TABLET ORAL 2 TIMES DAILY
Status: DISCONTINUED | OUTPATIENT
Start: 2024-09-11 | End: 2024-09-21 | Stop reason: HOSPADM

## 2024-09-11 RX ORDER — SODIUM CHLORIDE 9 MG/ML
75 INJECTION, SOLUTION INTRAVENOUS CONTINUOUS
Status: DISCONTINUED | OUTPATIENT
Start: 2024-09-11 | End: 2024-09-12

## 2024-09-11 RX ORDER — MORPHINE SULFATE 4 MG/ML
4 INJECTION, SOLUTION INTRAMUSCULAR; INTRAVENOUS EVERY 4 HOURS PRN
Status: DISCONTINUED | OUTPATIENT
Start: 2024-09-11 | End: 2024-09-11

## 2024-09-11 RX ORDER — HYDROCHLOROTHIAZIDE 12.5 MG/1
12.5 TABLET ORAL 2 TIMES DAILY
Status: DISCONTINUED | OUTPATIENT
Start: 2024-09-11 | End: 2024-09-13

## 2024-09-11 RX ORDER — LISINOPRIL 20 MG/1
20 TABLET ORAL 2 TIMES DAILY
Status: DISCONTINUED | OUTPATIENT
Start: 2024-09-11 | End: 2024-09-13

## 2024-09-11 RX ORDER — ENOXAPARIN SODIUM 100 MG/ML
40 INJECTION SUBCUTANEOUS DAILY
Status: DISCONTINUED | OUTPATIENT
Start: 2024-09-12 | End: 2024-09-21 | Stop reason: HOSPADM

## 2024-09-11 RX ADMIN — ACETAMINOPHEN 975 MG: 325 TABLET ORAL at 21:06

## 2024-09-11 RX ADMIN — OXYCODONE 5 MG: 5 TABLET ORAL at 21:42

## 2024-09-11 RX ADMIN — SODIUM CHLORIDE 1000 ML: 0.9 INJECTION, SOLUTION INTRAVENOUS at 17:43

## 2024-09-11 RX ADMIN — SODIUM CHLORIDE 75 ML/HR: 0.9 INJECTION, SOLUTION INTRAVENOUS at 21:06

## 2024-09-11 RX ADMIN — MORPHINE SULFATE 4 MG: 4 INJECTION INTRAVENOUS at 18:48

## 2024-09-11 RX ADMIN — AMPICILLIN SODIUM AND SULBACTAM SODIUM 3 G: 2; 1 INJECTION, POWDER, FOR SOLUTION INTRAMUSCULAR; INTRAVENOUS at 17:43

## 2024-09-11 RX ADMIN — HYDROCHLOROTHIAZIDE 12.5 MG: 12.5 TABLET ORAL at 20:53

## 2024-09-11 RX ADMIN — IOHEXOL 85 ML: 350 INJECTION, SOLUTION INTRAVENOUS at 19:40

## 2024-09-11 RX ADMIN — AMPICILLIN SODIUM AND SULBACTAM SODIUM 3 G: 2; 1 INJECTION, POWDER, FOR SOLUTION INTRAMUSCULAR; INTRAVENOUS at 23:18

## 2024-09-11 RX ADMIN — Medication 400 UNITS: at 21:07

## 2024-09-11 RX ADMIN — OXYCODONE HYDROCHLORIDE AND ACETAMINOPHEN 1000 MG: 500 TABLET ORAL at 20:53

## 2024-09-11 RX ADMIN — LISINOPRIL 20 MG: 20 TABLET ORAL at 20:53

## 2024-09-11 NOTE — ED PROVIDER NOTES
"1. Dog bite, initial encounter    2. Cellulitis of hand, right      ED Disposition       ED Disposition   Admit    Condition   Stable    Date/Time   Wed Sep 11, 2024  5:48 PM    Comment   Case was discussed with Dr Roger and the patient's admission status was agreed to be Admission Status: observation status to the service of Dr. Roger .               Assessment & Plan       Medical Decision Making  Amount and/or Complexity of Data Reviewed  External Data Reviewed: notes.     Details:   Patient did have surgery to his hand by orthopedic hand several months ago due to a dog bite      Labs: ordered. Decision-making details documented in ED Course.     Details:   No anemia, thrombocytopenia or leukocytosis  No acute kidney injury without any electrolyte abnormalities.  Glucose 175 without any anion gap acidosis  Lactic acid within normal range    Radiology: ordered and independent interpretation performed. Decision-making details documented in ED Course.     Details:   Right hand x-ray interpreted by myself as no fracture, dislocation or subcutaneous air visualized      Discussion of management or test interpretation with external provider(s): Orthopedic hand surgery    Risk  Decision regarding hospitalization.                  ED Course as of 09/11/24 1855   Wed Sep 11, 2024   1709 Patient is a 59-year-old male with multiple comorbidities including diabetes coming in today with an animal bite.  On exam he does have erythema, warmth, tenderness and swelling throughout the right volar and dorsal surface.  Will obtain labs, give antibiotics and will need further admission      Disclosure: Voice to text software was used in the preparation of this document and could have resulted in translational errors.      Occasional wrong word or \"sound a like\" substitutions may have occurred due to the inherent limitations of voice recognition software.  Read the chart carefully and recognize, using context, where " substitutions have occurred.       I have independently reviewed external records are available to me to the level of detail possible within the time constraints of my patient care responsibilities in the ED.       1747 Spoke with orthopedic/hand surgery and  who agrees with plan of care and will reach out to [facility]       1756     [facility] accepts patient       1854     Labs reviewed and without actionable derangement           Medications   morphine injection 4 mg (4 mg Intravenous Given 9/11/24 1848)   sodium chloride 0.9 % bolus 1,000 mL (1,000 mL Intravenous New Bag 9/11/24 1743)   ampicillin-sulbactam (UNASYN) 3 g in sodium chloride 0.9 % 100 mL IVPB (0 g Intravenous Stopped 9/11/24 1813)       History of Present Illness         Patient is a 59-year-old male with a history of hypertension, diabetes, chronic kidney disease and chronic pain coming in today with right hand swelling, redness and pain after his own dog bit him.  Dog is up-to-date.  Patient states that he startled his dog who is blind.  This has happened before.  Patient states that started a little bit of redness and progressively worsened to today.  He has no known fevers, chills, nausea, vomiting.  Patient states this happened before in the past      History provided by:  Medical records and patient  Dog Bite  Contact animal:  Dog  Location:  Hand  Time since incident:  1 day  Pain details:     Quality:  Aching, dull and sore    Severity:  Moderate    Timing:  Intermittent    Progression:  Worsening  Incident location:  Home  Animal's rabies vaccination status:  Up to date  Animal in possession: yes    Tetanus status:  Up to date  Relieved by:  Nothing  Worsened by:  Nothing  Ineffective treatments:  None tried  Associated symptoms: rash and swelling    Associated symptoms: no fever and no numbness          Review of Systems   Constitutional: Negative.  Negative for chills and fever.   HENT: Negative.  Negative for ear pain and sore throat.     Eyes:  Negative for pain and visual disturbance.   Respiratory: Negative.  Negative for cough and shortness of breath.    Cardiovascular: Negative.  Negative for chest pain and palpitations.   Gastrointestinal: Negative.  Negative for abdominal pain and vomiting.   Genitourinary: Negative.  Negative for dysuria and hematuria.   Musculoskeletal:  Negative for arthralgias and back pain.        Right hand pain and swelilng     Skin:  Positive for rash. Negative for color change.   Neurological:  Negative for seizures, syncope and numbness.   Hematological: Negative.    Psychiatric/Behavioral: Negative.     All other systems reviewed and are negative.          Objective     ED Triage Vitals   Temperature Pulse Blood Pressure Respirations SpO2 Patient Position - Orthostatic VS   09/11/24 1607 09/11/24 1607 09/11/24 1607 09/11/24 1607 09/11/24 1607 09/11/24 1607   98.4 °F (36.9 °C) 97 147/81 18 96 % Sitting      Temp Source Heart Rate Source BP Location FiO2 (%) Pain Score    09/11/24 1607 09/11/24 1607 09/11/24 1607 -- 09/11/24 1848    Oral Monitor Right arm  10 - Worst Possible Pain        Physical Exam  Vitals and nursing note reviewed.   Constitutional:       General: He is not in acute distress.     Appearance: He is well-developed.   HENT:      Head: Normocephalic and atraumatic.      Right Ear: External ear normal.      Left Ear: External ear normal.      Nose: Nose normal.      Mouth/Throat:      Mouth: Mucous membranes are moist.   Eyes:      Extraocular Movements: Extraocular movements intact.      Pupils: Pupils are equal, round, and reactive to light.   Neck:      Trachea: Trachea normal.   Cardiovascular:      Rate and Rhythm: Normal rate and regular rhythm.      Pulses:           Radial pulses are 2+ on the right side and 2+ on the left side.        Dorsalis pedis pulses are 2+ on the right side and 2+ on the left side.      Heart sounds: Normal heart sounds, S1 normal and S2 normal. No murmur  heard.  Pulmonary:      Effort: Pulmonary effort is normal. No respiratory distress.      Breath sounds: Normal breath sounds.   Abdominal:      Palpations: Abdomen is soft.      Tenderness: There is no abdominal tenderness.   Musculoskeletal:         General: No swelling.      Right wrist: Swelling and tenderness present.      Cervical back: Normal range of motion and neck supple. No rigidity.      Right lower leg: No edema.      Left lower leg: No edema.      Comments: Patient has full active range of motion of right shoulder, elbow pain-free with many muscle grade 5 out of 5.    Please refer to pictures of right hand.  There is noted erythema, swelling and warmth throughout the volar and dorsal surface of the right wrist and hand.  He has no swelling or pain to palpation of the digits 1 through 5 however has increased pain at the wrist with closing and opening his hand.  He does have several small puncture wounds about the dorsal and volar surface of the   Skin:     General: Skin is warm and dry.      Capillary Refill: Capillary refill takes less than 2 seconds.   Neurological:      General: No focal deficit present.      Mental Status: He is alert and oriented to person, place, and time.      GCS: GCS eye subscore is 4. GCS verbal subscore is 5. GCS motor subscore is 6.      Cranial Nerves: Cranial nerves 2-12 are intact.      Sensory: Sensation is intact.      Motor: Motor function is intact.      Coordination: Coordination is intact.   Psychiatric:         Mood and Affect: Mood normal.         Behavior: Behavior normal.               Labs Reviewed   CBC AND DIFFERENTIAL - Abnormal       Result Value    WBC 9.12      RBC 4.21      Hemoglobin 13.0      Hematocrit 38.6      MCV 92      MCH 30.9      MCHC 33.7      RDW 13.4      MPV 9.7      Platelets 211      nRBC 0      Segmented % 79 (*)     Immature Grans % 0      Lymphocytes % 9 (*)     Monocytes % 10      Eosinophils Relative 2      Basophils Relative 0       Absolute Neutrophils 7.15      Absolute Immature Grans 0.03      Absolute Lymphocytes 0.81      Absolute Monocytes 0.94      Eosinophils Absolute 0.15      Basophils Absolute 0.04     COMPREHENSIVE METABOLIC PANEL - Abnormal    Sodium 132 (*)     Potassium 4.4      Chloride 99      CO2 26      ANION GAP 7      BUN 24      Creatinine 1.34 (*)     Glucose 179 (*)     Calcium 9.6      AST 17      ALT 19      Alkaline Phosphatase 42      Total Protein 7.7      Albumin 4.5      Total Bilirubin 0.60      eGFR 56      Narrative:     National Kid[employer] guidelines for Chronic Kidney Disease (CKD):     Stage 1 with normal or high GFR (GFR > 90 mL/min/1.73 square meters)    Stage 2 Mild CKD (GFR = 60-89 mL/min/1.73 square meters)    Stage 3A Moderate CKD (GFR = 45-59 mL/min/1.73 square meters)    Stage 3B Moderate CKD (GFR = 30-44 mL/min/1.73 square meters)    Stage 4 Severe CKD (GFR = 15-29 mL/min/1.73 square meters)    Stage 5 End Stage CKD (GFR <15 mL/min/1.73 square meters)  Note: GFR calculation is accurate only with a steady state creatinine   LACTIC ACID, PLASMA (W/REFLEX IF RESULT > 2.0) - Normal    LACTIC ACID 1.0      Narrative:     Result may be elevated if tourniquet was used during collection.   MAGNESIUM - Normal    Magnesium 2.1       XR hand 3+ views RIGHT   ED Interpretation by Yesenia Bedolla DO (09/11 1757)   No fracture or dislocation.  No subcutaneous air visualized              Procedures       Yesenia Bedolla DO  09/11/24 1939

## 2024-09-11 NOTE — ED NOTES
Pt noted to have multiple live bed bugs crawling on his shirt during IV placement. 3 bugs were captured and given to charge RN. Primary RN aware of bugs.      Yesenia Keene RN  09/11/24 6036

## 2024-09-12 PROBLEM — L03.119 CELLULITIS OF MULTIPLE SITES OF HAND AND WRIST: Status: ACTIVE | Noted: 2022-02-22

## 2024-09-12 LAB
ALBUMIN SERPL BCG-MCNC: 3 G/DL (ref 3.5–5)
ALBUMIN SERPL BCG-MCNC: 3.9 G/DL (ref 3.5–5)
ALP SERPL-CCNC: 24 U/L (ref 34–104)
ALP SERPL-CCNC: 33 U/L (ref 34–104)
ALT SERPL W P-5'-P-CCNC: 11 U/L (ref 7–52)
ALT SERPL W P-5'-P-CCNC: 14 U/L (ref 7–52)
ANION GAP SERPL CALCULATED.3IONS-SCNC: 18 MMOL/L (ref 4–13)
ANION GAP SERPL CALCULATED.3IONS-SCNC: 5 MMOL/L (ref 4–13)
AST SERPL W P-5'-P-CCNC: 12 U/L (ref 13–39)
AST SERPL W P-5'-P-CCNC: 13 U/L (ref 13–39)
BASOPHILS # BLD AUTO: 0.01 THOUSANDS/ΜL (ref 0–0.1)
BASOPHILS NFR BLD AUTO: 0 % (ref 0–1)
BILIRUB SERPL-MCNC: 0.51 MG/DL (ref 0.2–1)
BILIRUB SERPL-MCNC: 0.69 MG/DL (ref 0.2–1)
BUN SERPL-MCNC: 20 MG/DL (ref 5–25)
BUN SERPL-MCNC: 23 MG/DL (ref 5–25)
CALCIUM ALBUM COR SERPL-MCNC: 7.6 MG/DL (ref 8.3–10.1)
CALCIUM SERPL-MCNC: 6.8 MG/DL (ref 8.4–10.2)
CALCIUM SERPL-MCNC: 8.8 MG/DL (ref 8.4–10.2)
CHLORIDE SERPL-SCNC: 105 MMOL/L (ref 96–108)
CHLORIDE SERPL-SCNC: 109 MMOL/L (ref 96–108)
CO2 SERPL-SCNC: 21 MMOL/L (ref 21–32)
CO2 SERPL-SCNC: 26 MMOL/L (ref 21–32)
CREAT SERPL-MCNC: 1.33 MG/DL (ref 0.6–1.3)
CREAT SERPL-MCNC: 1.38 MG/DL (ref 0.6–1.3)
EOSINOPHIL # BLD AUTO: 0.1 THOUSAND/ΜL (ref 0–0.61)
EOSINOPHIL NFR BLD AUTO: 2 % (ref 0–6)
ERYTHROCYTE [DISTWIDTH] IN BLOOD BY AUTOMATED COUNT: 13.8 % (ref 11.6–15.1)
ERYTHROCYTE [DISTWIDTH] IN BLOOD BY AUTOMATED COUNT: 13.9 % (ref 11.6–15.1)
GFR SERPL CREATININE-BSD FRML MDRD: 54 ML/MIN/1.73SQ M
GFR SERPL CREATININE-BSD FRML MDRD: 57 ML/MIN/1.73SQ M
GLUCOSE P FAST SERPL-MCNC: 130 MG/DL (ref 65–99)
GLUCOSE SERPL-MCNC: 102 MG/DL (ref 65–140)
GLUCOSE SERPL-MCNC: 130 MG/DL (ref 65–140)
GLUCOSE SERPL-MCNC: 143 MG/DL (ref 65–140)
GLUCOSE SERPL-MCNC: 150 MG/DL (ref 65–140)
GLUCOSE SERPL-MCNC: 151 MG/DL (ref 65–140)
GLUCOSE SERPL-MCNC: 189 MG/DL (ref 65–140)
HCT VFR BLD AUTO: 28.5 % (ref 36.5–49.3)
HCT VFR BLD AUTO: 35.7 % (ref 36.5–49.3)
HGB BLD-MCNC: 12.1 G/DL (ref 12–17)
HGB BLD-MCNC: 9.9 G/DL (ref 12–17)
IMM GRANULOCYTES # BLD AUTO: 0.01 THOUSAND/UL (ref 0–0.2)
IMM GRANULOCYTES NFR BLD AUTO: 0 % (ref 0–2)
LYMPHOCYTES # BLD AUTO: 0.82 THOUSANDS/ΜL (ref 0.6–4.47)
LYMPHOCYTES NFR BLD AUTO: 14 % (ref 14–44)
MCH RBC QN AUTO: 32.1 PG (ref 26.8–34.3)
MCH RBC QN AUTO: 32.9 PG (ref 26.8–34.3)
MCHC RBC AUTO-ENTMCNC: 33.9 G/DL (ref 31.4–37.4)
MCHC RBC AUTO-ENTMCNC: 34.7 G/DL (ref 31.4–37.4)
MCV RBC AUTO: 95 FL (ref 82–98)
MCV RBC AUTO: 95 FL (ref 82–98)
MONOCYTES # BLD AUTO: 0.73 THOUSAND/ΜL (ref 0.17–1.22)
MONOCYTES NFR BLD AUTO: 13 % (ref 4–12)
NEUTROPHILS # BLD AUTO: 4.09 THOUSANDS/ΜL (ref 1.85–7.62)
NEUTS SEG NFR BLD AUTO: 71 % (ref 43–75)
NRBC BLD AUTO-RTO: 0 /100 WBCS
PLATELET # BLD AUTO: 156 THOUSANDS/UL (ref 149–390)
PLATELET # BLD AUTO: 207 THOUSANDS/UL (ref 149–390)
PMV BLD AUTO: 9.7 FL (ref 8.9–12.7)
PMV BLD AUTO: 9.9 FL (ref 8.9–12.7)
POTASSIUM SERPL-SCNC: 3.4 MMOL/L (ref 3.5–5.3)
POTASSIUM SERPL-SCNC: 4.3 MMOL/L (ref 3.5–5.3)
PROT SERPL-MCNC: 5.9 G/DL (ref 6.4–8.4)
PROT SERPL-MCNC: 7.1 G/DL (ref 6.4–8.4)
RBC # BLD AUTO: 3.01 MILLION/UL (ref 3.88–5.62)
RBC # BLD AUTO: 3.77 MILLION/UL (ref 3.88–5.62)
SODIUM SERPL-SCNC: 136 MMOL/L (ref 135–147)
SODIUM SERPL-SCNC: 148 MMOL/L (ref 135–147)
WBC # BLD AUTO: 5.76 THOUSAND/UL (ref 4.31–10.16)
WBC # BLD AUTO: 7.05 THOUSAND/UL (ref 4.31–10.16)

## 2024-09-12 PROCEDURE — 80053 COMPREHEN METABOLIC PANEL: CPT | Performed by: STUDENT IN AN ORGANIZED HEALTH CARE EDUCATION/TRAINING PROGRAM

## 2024-09-12 PROCEDURE — 85025 COMPLETE CBC W/AUTO DIFF WBC: CPT | Performed by: STUDENT IN AN ORGANIZED HEALTH CARE EDUCATION/TRAINING PROGRAM

## 2024-09-12 PROCEDURE — 99233 SBSQ HOSP IP/OBS HIGH 50: CPT | Performed by: INTERNAL MEDICINE

## 2024-09-12 PROCEDURE — 85027 COMPLETE CBC AUTOMATED: CPT | Performed by: INTERNAL MEDICINE

## 2024-09-12 PROCEDURE — 80053 COMPREHEN METABOLIC PANEL: CPT | Performed by: INTERNAL MEDICINE

## 2024-09-12 PROCEDURE — 99222 1ST HOSP IP/OBS MODERATE 55: CPT | Performed by: ORTHOPAEDIC SURGERY

## 2024-09-12 PROCEDURE — 82948 REAGENT STRIP/BLOOD GLUCOSE: CPT

## 2024-09-12 RX ADMIN — CARVEDILOL 25 MG: 25 TABLET, FILM COATED ORAL at 08:01

## 2024-09-12 RX ADMIN — EMPAGLIFLOZIN 20 MG: 10 TABLET, FILM COATED ORAL at 08:02

## 2024-09-12 RX ADMIN — ACETAMINOPHEN 975 MG: 325 TABLET ORAL at 23:57

## 2024-09-12 RX ADMIN — Medication 400 UNITS: at 17:11

## 2024-09-12 RX ADMIN — MORPHINE SULFATE 4 MG: 4 INJECTION INTRAVENOUS at 01:35

## 2024-09-12 RX ADMIN — OXYCODONE 5 MG: 5 TABLET ORAL at 21:55

## 2024-09-12 RX ADMIN — Medication 400 UNITS: at 08:02

## 2024-09-12 RX ADMIN — OXYCODONE 5 MG: 5 TABLET ORAL at 13:20

## 2024-09-12 RX ADMIN — HYDROCHLOROTHIAZIDE 12.5 MG: 12.5 TABLET ORAL at 21:55

## 2024-09-12 RX ADMIN — ACETAMINOPHEN 975 MG: 325 TABLET ORAL at 05:13

## 2024-09-12 RX ADMIN — OXYCODONE HYDROCHLORIDE AND ACETAMINOPHEN 1000 MG: 500 TABLET ORAL at 21:55

## 2024-09-12 RX ADMIN — ACETAMINOPHEN 975 MG: 325 TABLET ORAL at 11:54

## 2024-09-12 RX ADMIN — AMPICILLIN SODIUM AND SULBACTAM SODIUM 3 G: 2; 1 INJECTION, POWDER, FOR SOLUTION INTRAMUSCULAR; INTRAVENOUS at 17:11

## 2024-09-12 RX ADMIN — AMPICILLIN SODIUM AND SULBACTAM SODIUM 3 G: 2; 1 INJECTION, POWDER, FOR SOLUTION INTRAMUSCULAR; INTRAVENOUS at 11:54

## 2024-09-12 RX ADMIN — AMPICILLIN SODIUM AND SULBACTAM SODIUM 3 G: 2; 1 INJECTION, POWDER, FOR SOLUTION INTRAMUSCULAR; INTRAVENOUS at 23:57

## 2024-09-12 RX ADMIN — LISINOPRIL 20 MG: 20 TABLET ORAL at 21:55

## 2024-09-12 RX ADMIN — OXYCODONE 5 MG: 5 TABLET ORAL at 05:19

## 2024-09-12 RX ADMIN — OXYCODONE HYDROCHLORIDE AND ACETAMINOPHEN 1000 MG: 500 TABLET ORAL at 08:01

## 2024-09-12 RX ADMIN — ACETAMINOPHEN 975 MG: 325 TABLET ORAL at 17:11

## 2024-09-12 RX ADMIN — CARVEDILOL 25 MG: 25 TABLET, FILM COATED ORAL at 17:11

## 2024-09-12 RX ADMIN — HYDROCHLOROTHIAZIDE 12.5 MG: 12.5 TABLET ORAL at 08:01

## 2024-09-12 RX ADMIN — AMPICILLIN SODIUM AND SULBACTAM SODIUM 3 G: 2; 1 INJECTION, POWDER, FOR SOLUTION INTRAMUSCULAR; INTRAVENOUS at 05:13

## 2024-09-12 RX ADMIN — LISINOPRIL 20 MG: 20 TABLET ORAL at 08:01

## 2024-09-12 NOTE — ASSESSMENT & PLAN NOTE
We discussed reasoning behind use of sliding scale insulin for tightly controlled blood sugars to assist with wound healing  .  Patient understands and refuses sliding scale insulin.  At this point he does not have any hemodynamic compromise and no VIVIANA so is reasonable to continue Jardiance and monitor fasting blood sugar with a.m. labs

## 2024-09-12 NOTE — PLAN OF CARE
Problem: PAIN - ADULT  Goal: Verbalizes/displays adequate comfort level or baseline comfort level  Description: Interventions:  - Encourage patient to monitor pain and request assistance  - Assess pain using appropriate pain scale  - Administer analgesics based on type and severity of pain and evaluate response  - Implement non-pharmacological measures as appropriate and evaluate response  - Consider cultural and social influences on pain and pain management  - Notify physician/advanced practitioner if interventions unsuccessful or patient reports new pain  Outcome: Progressing     Problem: INFECTION - ADULT  Goal: Absence or prevention of progression during hospitalization  Description: INTERVENTIONS:  - Assess and monitor for signs and symptoms of infection  - Monitor lab/diagnostic results  - Monitor all insertion sites, i.e. indwelling lines, tubes, and drains  - Monitor endotracheal if appropriate and nasal secretions for changes in amount and color  - [place] appropriate cooling/warming therapies per order  - Administer medications as ordered  - Instruct and encourage patient and family to use good hand hygiene technique  - Identify and instruct in appropriate isolation precautions for identified infection/condition  Outcome: Progressing     Problem: SAFETY ADULT  Goal: Patient will remain free of falls  Description: INTERVENTIONS:  - Educate patient/family on patient safety including physical limitations  - Instruct patient to call for assistance with activity   - Consult OT/PT to assist with strengthening/mobility   - Keep Call bell within reach  - Keep bed low and locked with side rails adjusted as appropriate  - Keep care items and personal belongings within reach  - Initiate and maintain comfort rounds  - Make Fall Risk Sign visible to staff  - Apply yellow socks and bracelet for high fall risk patients  - Consider moving patient to room near nurses station  Outcome: Progressing  Goal: Maintain or  return to baseline ADL function  Description: INTERVENTIONS:  -  Assess patient's ability to carry out ADLs; assess patient's baseline for ADL function and identify physical deficits which impact ability to perform ADLs (bathing, care of mouth/teeth, toileting, grooming, dressing, etc.)  - Assess/evaluate cause of self-care deficits   - Assess range of motion  - Assess patient's mobility; develop plan if impaired  - Assess patient's need for assistive devices and provide as appropriate  - Encourage maximum independence but intervene and supervise when necessary  - Involve family in performance of ADLs  - Assess for home care needs following discharge   - Consider OT consult to assist with ADL evaluation and planning for discharge  - Provide patient education as appropriate  Outcome: Progressing     Problem: DISCHARGE PLANNING  Goal: Discharge to home or other facility with appropriate resources  Description: INTERVENTIONS:  - Identify barriers to discharge w/patient and caregiver  - Arrange for needed discharge resources and transportation as appropriate  - Identify discharge learning needs (meds, wound care, etc.)  - Arrange for interpretive services to assist at discharge as needed  - Refer to Case Management Department for coordinating discharge planning if the patient needs post-hospital services based on physician/advanced practitioner order or complex needs related to functional status, cognitive ability, or social support system  Outcome: Progressing     Problem: Knowledge Deficit  Goal: Patient/family/caregiver demonstrates understanding of disease process, treatment plan, medications, and discharge instructions  Description: Complete learning assessment and assess knowledge base.  Interventions:  - Provide teaching at level of understanding  - Provide teaching via preferred learning methods  Outcome: Progressing     Problem: CARDIOVASCULAR - ADULT  Goal: Maintains optimal cardiac output and hemodynamic  stability  Description: INTERVENTIONS:  - Monitor I/O, vital signs and rhythm  - Monitor for S/S and trends of decreased cardiac output  - Administer and titrate ordered vasoactive medications to optimize hemodynamic stability  - Assess quality of pulses, skin color and temperature  - Assess for signs of decreased coronary artery perfusion  - Instruct patient to report change in severity of symptoms  Outcome: Progressing     Problem: RESPIRATORY - ADULT  Goal: Achieves optimal ventilation and oxygenation  Description: INTERVENTIONS:  - Assess for changes in respiratory status  - Assess for changes in mentation and behavior  - Position to facilitate oxygenation and minimize respiratory effort  - Oxygen administered by appropriate delivery if ordered  - Initiate smoking cessation education as indicated  - Encourage broncho-pulmonary hygiene including cough, deep breathe, Incentive Spirometry  - Assess the need for suctioning and aspirate as needed  - Assess and instruct to report SOB or any respiratory difficulty  - Respiratory Therapy support as indicated  Outcome: Progressing     Problem: GASTROINTESTINAL - ADULT  Goal: Minimal or absence of nausea and/or vomiting  Description: INTERVENTIONS:  - Administer IV fluids if ordered to ensure adequate hydration  - Maintain NPO status until nausea and vomiting are resolved  - Nasogastric tube if ordered  - Administer ordered antiemetic medications as needed  - Provide nonpharmacologic comfort measures as appropriate  - Advance diet as tolerated, if ordered  - Consider nutrition services referral to assist patient with adequate nutrition and appropriate food choices  Outcome: Progressing  Goal: Maintains or returns to baseline bowel function  Description: INTERVENTIONS:  - Assess bowel function  - Encourage oral fluids to ensure adequate hydration  - Administer IV fluids if ordered to ensure adequate hydration  - Administer ordered medications as needed  - Encourage  mobilization and activity  - Consider nutritional services referral to assist patient with adequate nutrition and appropriate food choices  Outcome: Progressing  Goal: Maintains adequate nutritional intake  Description: INTERVENTIONS:  - Monitor percentage of each meal consumed  - Identify factors contributing to decreased intake, treat as appropriate  - Assist with meals as needed  - Monitor I&O, weight, and lab values if indicated  - Obtain nutrition services referral as needed  Outcome: Progressing     Problem: GENITOURINARY - ADULT  Goal: Maintains or returns to baseline urinary function  Description: INTERVENTIONS:  - Assess urinary function  - Encourage oral fluids to ensure adequate hydration if ordered  - Administer IV fluids as ordered to ensure adequate hydration  - Administer ordered medications as needed  - Offer frequent toileting  - Follow urinary retention protocol if ordered  Outcome: Progressing  Goal: Absence of urinary retention  Description: INTERVENTIONS:  - Assess patient’s ability to void and empty bladder  - Monitor I/O  - Bladder scan as needed  - Discuss with physician/AP medications to alleviate retention as needed  - Discuss catheterization for long term situations as appropriate  Outcome: Progressing     Problem: METABOLIC, FLUID AND ELECTROLYTES - ADULT  Goal: Electrolytes maintained within normal limits  Description: INTERVENTIONS:  - Monitor labs and assess patient for signs and symptoms of electrolyte imbalances  - Administer electrolyte replacement as ordered  - Monitor response to electrolyte replacements, including repeat lab results as appropriate  - Instruct patient on fluid and nutrition as appropriate  Outcome: Progressing  Goal: Fluid balance maintained  Description: INTERVENTIONS:  - Monitor labs   - Monitor I/O and WT  - Instruct patient on fluid and nutrition as appropriate  - Assess for signs & symptoms of volume excess or deficit  Outcome: Progressing     Problem:  SKIN/TISSUE INTEGRITY - ADULT  Goal: Incision(s), wounds(s) or drain site(s) healing without S/S of infection  Description: INTERVENTIONS  - Assess and document dressing, incision, wound bed, drain sites and surrounding tissue  - Provide patient and family education  - Perform skin care/dressing changes   Outcome: Progressing     Problem: HEMATOLOGIC - ADULT  Goal: Maintains hematologic stability  Description: INTERVENTIONS  - Assess for signs and symptoms of bleeding or hemorrhage  - Monitor labs  - Administer supportive blood products/factors as ordered and appropriate  Outcome: Progressing     Problem: MUSCULOSKELETAL - ADULT  Goal: Maintain or return mobility to safest level of function  Description: INTERVENTIONS:  - Assess patient's ability to carry out ADLs; assess patient's baseline for ADL function and identify physical deficits which impact ability to perform ADLs (bathing, care of mouth/teeth, toileting, grooming, dressing, etc.)  - Assess/evaluate cause of self-care deficits   - Assess range of motion  - Assess patient's mobility  - Assess patient's need for assistive devices and provide as appropriate  - Encourage maximum independence but intervene and supervise when necessary  - Involve family in performance of ADLs  - Assess for home care needs following discharge   - Consider OT consult to assist with ADL evaluation and planning for discharge  - Provide patient education as appropriate  Outcome: Progressing

## 2024-09-12 NOTE — PLAN OF CARE
Problem: PAIN - ADULT  Goal: Verbalizes/displays adequate comfort level or baseline comfort level  Description: Interventions:  - Encourage patient to monitor pain and request assistance  - Assess pain using appropriate pain scale  - Administer analgesics based on type and severity of pain and evaluate response  - Implement non-pharmacological measures as appropriate and evaluate response  - Consider cultural and social influences on pain and pain management  - Notify physician/advanced practitioner if interventions unsuccessful or patient reports new pain  Outcome: Progressing     Problem: INFECTION - ADULT  Goal: Absence or prevention of progression during hospitalization  Description: INTERVENTIONS:  - Assess and monitor for signs and symptoms of infection  - Monitor lab/diagnostic results  - Monitor all insertion sites, i.e. indwelling lines, tubes, and drains  - Monitor endotracheal if appropriate and nasal secretions for changes in amount and color  - [place] appropriate cooling/warming therapies per order  - Administer medications as ordered  - Instruct and encourage patient and family to use good hand hygiene technique  - Identify and instruct in appropriate isolation precautions for identified infection/condition  Outcome: Progressing     Problem: SAFETY ADULT  Goal: Patient will remain free of falls  Description: INTERVENTIONS:  - Educate patient/family on patient safety including physical limitations  - Instruct patient to call for assistance with activity   - Consult OT/PT to assist with strengthening/mobility   - Keep Call bell within reach  - Keep bed low and locked with side rails adjusted as appropriate  - Keep care items and personal belongings within reach  - Initiate and maintain comfort rounds  - Make Fall Risk Sign visible to staff  - Apply yellow socks and bracelet for high fall risk patients  - Consider moving patient to room near nurses station  Outcome: Progressing  Goal: Maintain or  return to baseline ADL function  Description: INTERVENTIONS:  -  Assess patient's ability to carry out ADLs; assess patient's baseline for ADL function and identify physical deficits which impact ability to perform ADLs (bathing, care of mouth/teeth, toileting, grooming, dressing, etc.)  - Assess/evaluate cause of self-care deficits   - Assess range of motion  - Assess patient's mobility; develop plan if impaired  - Assess patient's need for assistive devices and provide as appropriate  - Encourage maximum independence but intervene and supervise when necessary  - Involve family in performance of ADLs  - Assess for home care needs following discharge   - Consider OT consult to assist with ADL evaluation and planning for discharge  - Provide patient education as appropriate  Outcome: Progressing     Problem: DISCHARGE PLANNING  Goal: Discharge to home or other facility with appropriate resources  Description: INTERVENTIONS:  - Identify barriers to discharge w/patient and caregiver  - Arrange for needed discharge resources and transportation as appropriate  - Identify discharge learning needs (meds, wound care, etc.)  - Arrange for interpretive services to assist at discharge as needed  - Refer to Case Management Department for coordinating discharge planning if the patient needs post-hospital services based on physician/advanced practitioner order or complex needs related to functional status, cognitive ability, or social support system  Outcome: Progressing     Problem: Knowledge Deficit  Goal: Patient/family/caregiver demonstrates understanding of disease process, treatment plan, medications, and discharge instructions  Description: Complete learning assessment and assess knowledge base.  Interventions:  - Provide teaching at level of understanding  - Provide teaching via preferred learning methods  Outcome: Progressing     Problem: CARDIOVASCULAR - ADULT  Goal: Maintains optimal cardiac output and hemodynamic  stability  Description: INTERVENTIONS:  - Monitor I/O, vital signs and rhythm  - Monitor for S/S and trends of decreased cardiac output  - Administer and titrate ordered vasoactive medications to optimize hemodynamic stability  - Assess quality of pulses, skin color and temperature  - Assess for signs of decreased coronary artery perfusion  - Instruct patient to report change in severity of symptoms  Outcome: Progressing     Problem: RESPIRATORY - ADULT  Goal: Achieves optimal ventilation and oxygenation  Description: INTERVENTIONS:  - Assess for changes in respiratory status  - Assess for changes in mentation and behavior  - Position to facilitate oxygenation and minimize respiratory effort  - Oxygen administered by appropriate delivery if ordered  - Initiate smoking cessation education as indicated  - Encourage broncho-pulmonary hygiene including cough, deep breathe, Incentive Spirometry  - Assess the need for suctioning and aspirate as needed  - Assess and instruct to report SOB or any respiratory difficulty  - Respiratory Therapy support as indicated  Outcome: Progressing     Problem: GASTROINTESTINAL - ADULT  Goal: Minimal or absence of nausea and/or vomiting  Description: INTERVENTIONS:  - Administer IV fluids if ordered to ensure adequate hydration  - Maintain NPO status until nausea and vomiting are resolved  - Nasogastric tube if ordered  - Administer ordered antiemetic medications as needed  - Provide nonpharmacologic comfort measures as appropriate  - Advance diet as tolerated, if ordered  - Consider nutrition services referral to assist patient with adequate nutrition and appropriate food choices  Outcome: Progressing  Goal: Maintains or returns to baseline bowel function  Description: INTERVENTIONS:  - Assess bowel function  - Encourage oral fluids to ensure adequate hydration  - Administer IV fluids if ordered to ensure adequate hydration  - Administer ordered medications as needed  - Encourage  mobilization and activity  - Consider nutritional services referral to assist patient with adequate nutrition and appropriate food choices  Outcome: Progressing  Goal: Maintains adequate nutritional intake  Description: INTERVENTIONS:  - Monitor percentage of each meal consumed  - Identify factors contributing to decreased intake, treat as appropriate  - Assist with meals as needed  - Monitor I&O, weight, and lab values if indicated  - Obtain nutrition services referral as needed  Outcome: Progressing     Problem: GENITOURINARY - ADULT  Goal: Maintains or returns to baseline urinary function  Description: INTERVENTIONS:  - Assess urinary function  - Encourage oral fluids to ensure adequate hydration if ordered  - Administer IV fluids as ordered to ensure adequate hydration  - Administer ordered medications as needed  - Offer frequent toileting  - Follow urinary retention protocol if ordered  Outcome: Progressing  Goal: Absence of urinary retention  Description: INTERVENTIONS:  - Assess patient’s ability to void and empty bladder  - Monitor I/O  - Bladder scan as needed  - Discuss with physician/AP medications to alleviate retention as needed  - Discuss catheterization for long term situations as appropriate  Outcome: Progressing     Problem: METABOLIC, FLUID AND ELECTROLYTES - ADULT  Goal: Electrolytes maintained within normal limits  Description: INTERVENTIONS:  - Monitor labs and assess patient for signs and symptoms of electrolyte imbalances  - Administer electrolyte replacement as ordered  - Monitor response to electrolyte replacements, including repeat lab results as appropriate  - Instruct patient on fluid and nutrition as appropriate  Outcome: Progressing  Goal: Fluid balance maintained  Description: INTERVENTIONS:  - Monitor labs   - Monitor I/O and WT  - Instruct patient on fluid and nutrition as appropriate  - Assess for signs & symptoms of volume excess or deficit  Outcome: Progressing     Problem:  SKIN/TISSUE INTEGRITY - ADULT  Goal: Incision(s), wounds(s) or drain site(s) healing without S/S of infection  Description: INTERVENTIONS  - Assess and document dressing, incision, wound bed, drain sites and surrounding tissue  - Provide patient and family education  - Perform skin care/dressing changes   Outcome: Progressing     Problem: HEMATOLOGIC - ADULT  Goal: Maintains hematologic stability  Description: INTERVENTIONS  - Assess for signs and symptoms of bleeding or hemorrhage  - Monitor labs  - Administer supportive blood products/factors as ordered and appropriate  Outcome: Progressing     Problem: MUSCULOSKELETAL - ADULT  Goal: Maintain or return mobility to safest level of function  Description: INTERVENTIONS:  - Assess patient's ability to carry out ADLs; assess patient's baseline for ADL function and identify physical deficits which impact ability to perform ADLs (bathing, care of mouth/teeth, toileting, grooming, dressing, etc.)  - Assess/evaluate cause of self-care deficits   - Assess range of motion  - Assess patient's mobility  - Assess patient's need for assistive devices and provide as appropriate  - Encourage maximum independence but intervene and supervise when necessary  - Involve family in performance of ADLs  - Assess for home care needs following discharge   - Consider OT consult to assist with ADL evaluation and planning for discharge  - Provide patient education as appropriate  Outcome: Progressing

## 2024-09-12 NOTE — PROGRESS NOTES
Progress Note - Hospitalist   Name: Yoni Castillo 59 y.o. male I MRN: [id number]  Unit/Bed#: E5 -01 I Date of Admission: 9/11/2024   Date of Service: 9/12/2024 I Hospital Day: 0    Assessment & Plan  Essential hypertension  Continue home meds  Type 2 diabetes mellitus with chronic kidney disease, without long-term current use of insulin ([facility])  We discussed reasoning behind use of sliding scale insulin for tightly controlled blood sugars to assist with wound healing  .  Patient understands and refuses sliding scale insulin.  At this point he does not have any hemodynamic compromise and no VIVIANA so is reasonable to continue Jardiance and monitor fasting blood sugar with a.m. labs    Neuropathy  Patient currently on several vitamins including vitamin C, garlic capsule, vitamin EE for his peripheral neuropathy.    Chronic kidney disease, stage 3 (HCC)  CKD stage IIIa at baseline  Arthritis  Home medication includes tramadol.  Will discontinue that for now, to avoid polypharmacy with opioid medications.  Continue IV morphine for his hand as needed.  Dog bite  Dog bite of right hand with swelling and edema  Evaluated by orthopedics who recommends warm soapy baths 3 times daily.  No surgical intervention at this time.  Continue Unasyn  Trend fever, WBC curve, exam      VTE Pharmacologic Prophylaxis: lovenox     Patient Centered Rounds:  Patient care rounds were performed with nursing    Education and Discussions with Family / Patient: Patient    Time Spent for Care: I have spent a total time of 53 minutes on 09/12/24 in caring for this patient including Diagnostic results, Prognosis, Instructions for management, Patient and family education, Importance of tx compliance, Impressions, Documenting in the medical record, Reviewing / ordering tests, medicine, procedures  , Obtaining or reviewing history  , and Communicating with other healthcare professionals .      Current Length of Stay: 0 day(s)    Current Patient  Status: Inpatient   Certification Statement: The patient will continue to require additional inpatient hospital stay due to need for IV medications    Discharge Plan: 24, 48 hours pending improvement    Code Status: Level 1 - Full Code      Subjective:   Patient seen and evaluated at bedside.  He offered recommendation for deeper basin and as the current basin and does not allow him to fit his wrist in the soapy water.  Nursing will look for a different vessel.    Objective:     Vitals:   Temp (24hrs), Av.1 °F (37.3 °C), Min:98.4 °F (36.9 °C), Max:99.6 °F (37.6 °C)    Temp:  [98.4 °F (36.9 °C)-99.6 °F (37.6 °C)] 99.6 °F (37.6 °C)  HR:  [84-97] 89  Resp:  [18] 18  BP: (110-175)/(78-97) 130/78  SpO2:  [94 %-98 %] 94 %  Body mass index is 37.37 kg/m².     Input and Output Summary (last 24 hours):       Intake/Output Summary (Last 24 hours) at 2024 1503  Last data filed at 2024 2144  Gross per 24 hour   Intake 1580 ml   Output 450 ml   Net 1130 ml       Physical Exam:     Physical Exam  Vitals reviewed.   Constitutional:       General: He is not in acute distress.     Appearance: He is well-developed. He is not ill-appearing, toxic-appearing or diaphoretic.   HENT:      Head: Normocephalic and atraumatic.      Mouth/Throat:      Mouth: Mucous membranes are moist.   Eyes:      General: No scleral icterus.     Extraocular Movements: Extraocular movements intact.   Cardiovascular:      Rate and Rhythm: Normal rate and regular rhythm.      Heart sounds: Normal heart sounds.   Pulmonary:      Effort: Pulmonary effort is normal. No respiratory distress.      Breath sounds: Normal breath sounds. No wheezing or rales.   Abdominal:      General: There is no distension.      Palpations: Abdomen is soft.      Tenderness: There is no abdominal tenderness. There is no guarding or rebound.   Musculoskeletal:         General: No swelling, tenderness or deformity.   Skin:     General: Skin is warm and dry.      Comments:  Following erythema and excoriations on right dorsal wrist, no clear drainage   Neurological:      General: No focal deficit present.      Mental Status: He is alert. Mental status is at baseline.   Psychiatric:         Mood and Affect: Mood normal.         Behavior: Behavior normal.         Thought Content: Thought content normal.         Judgment: Judgment normal.         Additional Data:     Labs: I have reviewed pertinent results     Results from last 7 days   Lab Units 09/12/24  0851 09/12/24  0615   WBC Thousand/uL 7.05 5.76   HEMOGLOBIN g/dL 12.1 9.9*   HEMATOCRIT % 35.7* 28.5*   PLATELETS Thousands/uL 207 156   SEGS PCT %  --  71   LYMPHO PCT %  --  14   MONO PCT %  --  13*   EOS PCT %  --  2     Results from last 7 days   Lab Units 09/12/24  0851   SODIUM mmol/L 136   POTASSIUM mmol/L 4.3   CHLORIDE mmol/L 105   CO2 mmol/L 26   BUN mg/dL 23   CREATININE mg/dL 1.38*   ANION GAP mmol/L 5   CALCIUM mg/dL 8.8   ALBUMIN g/dL 3.9   TOTAL BILIRUBIN mg/dL 0.69   ALK PHOS U/L 33*   ALT U/L 14   AST U/L 13   GLUCOSE RANDOM mg/dL 130         Results from last 7 days   Lab Units 09/12/24  1130 09/12/24  0728 09/11/24  2247   POC GLUCOSE mg/dl 189* 143* 156*         Results from last 7 days   Lab Units 09/11/24  1742   LACTIC ACID mmol/L 1.0         Imaging: I have reviewed pertinent imaging       Recent Cultures (last 7 days):           Last 24 Hours Medication List:   Current Facility-Administered Medications   Medication Dose Route Frequency Provider Last Rate    acetaminophen  975 mg Oral Q6H [facility] Baldev Jerez DO      ampicillin-sulbactam  3 g Intravenous Q6H Baldev Jerez DO 3 g (09/12/24 1154)    vitamin C  1,000 mg Oral BID Baldev Jerez DO      carvedilol  25 mg Oral BID With Meals Baldev Jerez DO      Empagliflozin  20 mg Oral Daily Baldev Jerez DO      enoxaparin  40 mg Subcutaneous Daily Baldev Jerez DO      lisinopril  20 mg Oral BID Baldev  Ruth Ann Jerez DO      And    hydroCHLOROthiazide  12.5 mg Oral BID Baldev Jerez DO      morphine injection  4 mg Intravenous Q6H PRN Vanessa Roger MD      oxyCODONE  5 mg Oral Q4H PRN Vanessa Roger MD      vitamin E (tocopherol)  400 Units Oral BID Baldev Jerez DO          Today, Patient Was Seen By: Sean Morgan DO    ** Please Note: Dictation voice to text software may have been used in the creation of this document. **

## 2024-09-12 NOTE — CASE MANAGEMENT
Case Management Assessment & Discharge Planning Note    Patient name Yoni Castillo  Location [facility] /E5 -* MRN [id number]  : 1965 Date 2024       Current Admission Date: 2024  Current Admission Diagnosis:Dog bite   Patient Active Problem List    Diagnosis Date Noted Date Diagnosed    Preop exam for internal medicine 2024     Mucoid cyst of joint 2024     Staghorn calculus 2024     Dog bite 2024     Dupuytren's disease of finger with nodules without contracture 10/17/2023     Acquired absence of other left toe(s) ([facility]) 2023     Incisional hernia without obstruction or gangrene 2023     Ventral hernia without obstruction or gangrene 06/15/2023     Arthritis 2022     Chronic kidney disease, stage 3 ([facility]) 2022     Neuropathy 2021     Low back pain with sciatica 2021     Cervical radiculopathy 2021     Class 2 severe obesity with serious comorbidity and body mass index (BMI) of 35.0 to 35.9 in adult ([facility]) 2021     Essential hypertension      Type 2 diabetes mellitus with chronic kidney disease, without long-term current use of insulin ([facility])        LOS (days): 0  Geometric Mean LOS (GMLOS) (days): 3.2  Days to GMLOS:3     OBJECTIVE:    Risk of Unplanned Readmission Score: 10.93     Current admission status: Inpatient    Preferred Pharmacy:   [facility] [address],  [address] [place] [address],  [facility] [address]  Phone: [phone] Fax: [phone]    Primary Care Provider: Judd Ji MD    Primary Insurance: MEDICARE  Secondary Insurance: BLUE CROSS    ASSESSMENT:  Active Health Care Proxies       tevin dutta Health Care Representative - Significant Other   Primary Phone: [phone] (Mobile)                 Advance Directives  Does patient have a Health Care POA?: Yes  Does patient have Advance Directives?: Yes  Advance Directives: Living will,  Power of  for health care  Primary Contact: Louise Weber    Readmission Root Cause  30 Day Readmission: No    Patient Information  Admitted from:: Home  Mental Status: Alert  During Assessment patient was accompanied by: Not accompanied during assessment  Assessment information provided by:: Patient  Primary Caregiver: Self  Support Systems: Self, Family members  County of Residence: [place]  What city do you live in?: [place]  Type of Current Residence: [address] [place] home  Living Arrangements: Lives w/ Spouse/significant other  Is patient a ?: No    Activities of Daily Living Prior to Admission  Functional Status: Independent  Completes ADLs independently?: Yes  Ambulates independently?: Yes  Does patient use assisted devices?: No  Does patient currently own DME?: Yes  What DME does the patient currently own?: Straight Cane, Rollator  Does patient have a history of Outpatient Therapy (PT/OT)?: No  Does the patient have a history of Short-Term Rehab?: No  Does patient have a history of HHC?: No  Does patient currently have HHC?: No    Patient Information Continued  Does patient have prescription coverage?: Yes  Does patient receive dialysis treatments?: No  Does patient have a history of substance abuse?: No  Does patient have a history of Mental Health Diagnosis?: No    Means of Transportation  Means of Transport to Appts:: Drives Self    Social Determinants of Health (SDOH)      Flowsheet Row Most Recent Value   Housing Stability    In the last 12 months, was there a time when you were not able to pay the mortgage or rent on time? N   In the past 12 months, how many times have you moved where you were living? 0   At any time in the past 12 months, were you homeless or living in a shelter (including now)? N   Transportation Needs    In the past 12 months, has lack of transportation kept you from medical appointments or from getting medications? no   In the past 12 months, has lack of transportation kept you  from meetings, work, or from getting things needed for daily living? No   Food Insecurity    Within the past 12 months, you worried that your food would run out before you got the money to buy more. Never true   Within the past 12 months, the food you bought just didn't last and you didn't have money to get more. Never true   Utilities    In the past 12 months has the electric, gas, oil, or water company threatened to shut off services in your home? No          DISCHARGE DETAILS:    Discharge planning discussed with:: pt     CM contacted family/caregiver?: Yes  Were Treatment Team discharge recommendations reviewed with patient/caregiver?: Yes  Did patient/caregiver verbalize understanding of patient care needs?: Yes  Were patient/caregiver advised of the risks associated with not following Treatment Team discharge recommendations?: Yes    Contacts  Patient Contacts: Louise Trejo  Relationship to Patient:: Family  Contact Method: Phone  Reason/Outcome: Discharge Planning    Requested Home Health Care         Is the patient interested in HHC at discharge?: No    DME Referral Provided  Referral made for DME?: No    Other Referral/Resources/Interventions Provided:  Interventions: None Indicated    Treatment Team Recommendation: Home  Discharge Destination Plan:: Home  Transport at Discharge : Family     Additional Comments: Cm spoke with pt's SO over the phone. Pt lives with his SO in a [address]. He is ind with no use of DME. He owns a cane and rollator. He has no hx of outpt PT, STR, HHC, D/A, or  tx hx. He will have his SO transport him home.

## 2024-09-12 NOTE — ASSESSMENT & PLAN NOTE
Dog bite of right hand with swelling and edema  Evaluated by orthopedics who recommends warm soapy baths 3 times daily.  No surgical intervention at this time.  Continue Unasyn  Trend fever, WBC curve, exam

## 2024-09-12 NOTE — ASSESSMENT & PLAN NOTE
Lab Results   Component Value Date    EGFR 56 09/11/2024    EGFR 49 03/06/2024    EGFR 48 03/06/2024    CREATININE 1.34 (H) 09/11/2024    CREATININE 1.50 (H) 03/06/2024    CREATININE 1.53 (H) 03/06/2024   -Management per primary team

## 2024-09-12 NOTE — CONSULTS
Consultation - Orthopedics   Name: Yoni Castillo 59 y.o. male I MRN: [id number]  Unit/Bed#: E5 -01 I Date of Admission: 9/11/2024   Date of Service: 9/12/2024 I Hospital Day: 0   Inpatient consult to Hand Surgery  Consult performed by: Carroll Mccarthy MD  Consult ordered by: Badlev Jerez DO        Physician Requesting Evaluation: Vanessa Roger MD   Reason for Evaluation / Principal Problem: Right hand/wrist cellulitis dorsally and volarly following a dog bite    Assessment & Plan  Cellulitis of multiple sites of hand and wrist   -Clinically stable compared to yesterday with volar and dorsal erythema centered over the distal forearm/wrist.  Clinical exam consistent with cellulitis, no fluid collections palpable or visible on CT scan yesterday.  No indication for surgical intervention at this time.   -Recommend continued IV antibiotics per primary team   -Start warm soapy soaks 3 times daily to encourage draining from his puncture wounds and prevent closure   -Encourage active finger range of motion to avoid stiffness  Dog bite  -See above management  Essential hypertension  -Management per primary team    Type 2 diabetes mellitus with chronic kidney disease, without long-term current use of insulin ([facility])  Lab Results   Component Value Date    HGBA1C 8.0 (H) 06/12/2024       Recent Labs     09/11/24  2247   POCGLU 156*       Blood Sugar Average: Last 72 hrs:  (P) 156    -Management per primary team encourage keeping blood glucose levels less than 180 to help control this infection.  Currently well-managed.  Neuropathy  -Management per primary team  Chronic kidney disease, stage 3 ([facility])  Lab Results   Component Value Date    EGFR 56 09/11/2024    EGFR 49 03/06/2024    EGFR 48 03/06/2024    CREATININE 1.34 (H) 09/11/2024    CREATININE 1.50 (H) 03/06/2024    CREATININE 1.53 (H) 03/06/2024   -Management per primary team  Arthritis  -Activity as tolerated, doing well from previous DIP joint  mucous cyst excisions      History of Present Illness   HPI: Yoni Castillo is a 59 y.o. year old male who presents with right wrist/hand pain redness and swelling following a dog bite 2 days ago.  This was his personal dog.  Unfortunately he has had issues with infections after dog bite in the past which I had previously managed with a bedside I&D approximately 8 months ago while admitted at [facility].  He recovered well from that incident.    These new symptoms started approximately 2 days prior to presentation.  Increasing redness pain and swelling.  He immediately cleaned his wounds and started performing warm soapy water soaks that as we had discussed previously after his prior injury.  Unfortunately his symptoms progressed and he presented emergency department for evaluation.  Pain centered at the dorsal and volar wrist.  No significant pain with wrist or finger range of motion.  Denies numbness or tingling worse than baseline.  ED provider note reviewed and discussed personally with the ED provider yesterday.  Admitted for IV antibiotics.    Review of Systems  I have reviewed the patient's PMH, PSH, Social History, Family History, Meds, and Allergies  Review of systems negative aside from HPI    Objective      Temp:  [98.4 °F (36.9 °C)-99.2 °F (37.3 °C)] 99.2 °F (37.3 °C)  HR:  [84-97] 93  Resp:  [18] 18  BP: (122-175)/(81-97) 122/84  O2 Device: None (Room air)          I/O last 24 hours:  In: 1580 [P.O.:480; IV Piggyback:1100]  Out: 450 [Urine:450]  Lines/Drains/Airways       Active Status       None                  Physical Exam     Gen: A and O x 3, no acute distress  RUE:   Erythema mild swelling over the dorsal wrist and hand from the level of the mid metacarpals to approximately 6 cm proximal to the radiocarpal joint dorsally and over the distal forearm approximately 6 cm proximal to the proximal wrist flexion crease volarly.  No proximal extension of erythema.  Proximal forearm musculature compartments  soft and compressible and painless.    Tender over the areas of erythema.  No palpable fluctuance  No drainage or expressible fluid from the puncture sites  Able to make a loose fist without significant discomfort  No significant pain with wrist micromotion but full range of motion limited  Sensation intact to light touch in the median, ulnar, radial nerve distributions  Warm well-perfused fingers      Lab Results: I have reviewed the following results: CBC/BMP:   .     09/11/24  1742   WBC 9.12   HGB 13.0   HCT 38.6      SODIUM 132*   K 4.4   CL 99   CO2 26   BUN 24   CREATININE 1.34*   GLUC 179*   MG 2.1    , Lactic Acid:   .     09/11/24 1742   LACTICACID 1.0       Recent Labs     09/11/24  1742   WBC 9.12   HGB 13.0   HCT 38.6      BUN 24   CREATININE 1.34*     Blood Culture:   Lab Results   Component Value Date    BLOODCX No Growth After 5 Days. 11/28/2023     Wound Culture:   Lab Results   Component Value Date    WOUNDCULT 2+ Growth of 04/26/2024

## 2024-09-12 NOTE — ASSESSMENT & PLAN NOTE
-Clinically stable compared to yesterday with volar and dorsal erythema centered over the distal forearm/wrist.  Clinical exam consistent with cellulitis, no fluid collections palpable or visible on CT scan yesterday.  No indication for surgical intervention at this time.   -Recommend continued IV antibiotics per primary team   -Start warm soapy soaks 3 times daily to encourage draining from his puncture wounds and prevent closure   -Encourage active finger range of motion to avoid stiffness

## 2024-09-12 NOTE — ASSESSMENT & PLAN NOTE
Home medication includes tramadol.  Will discontinue that for now, to avoid polypharmacy with opioid medications.  Continue IV morphine for his hand as needed.

## 2024-09-12 NOTE — ASSESSMENT & PLAN NOTE
Lab Results   Component Value Date    HGBA1C 8.0 (H) 06/12/2024       Recent Labs     09/11/24  2247   POCGLU 156*       Blood Sugar Average: Last 72 hrs:  (P) 156    -Management per primary team encourage keeping blood glucose levels less than 180 to help control this infection.  Currently well-managed.

## 2024-09-12 NOTE — ASSESSMENT & PLAN NOTE
Patient currently on several vitamins including vitamin C, garlic capsule, vitamin EE for his peripheral neuropathy.

## 2024-09-12 NOTE — H&P
"Assessment and Plan  * Dog bite  Assessment & Plan  Dog bite in the right upper extremity with mild swelling and erythematous area concerning for cellulitis.  Sensation intact bilaterally and radial and ulnar pulses intact.  Hand consulted recommendations for inpatient admission for IV antibiotics.    Initiate Unasyn 3 mg every 8  Ortho hand consulted appreciate further recommendations  Tylenol scheduled for pain, with IV morphine 4 mg every 4 for pain  CT scan of right upper extremity ordered to rule out any underlying abscess      Arthritis  Assessment & Plan  Home medication includes tramadol.  Will discontinue that for now, to avoid polypharmacy with opioid medications.  Continue IV morphine for his hand as needed.    Chronic kidney disease, stage 3 (HCC)  Assessment & Plan  CKD stage IIIa.  Avoid nephrotoxic agents.  Patient's status post CT with IV contrast, will administer some fluids.    Neuropathy  Assessment & Plan  Patient currently on several vitamins including vitamin C, garlic capsule, vitamin EE for his peripheral neuropathy.  States that he has more at home, encourage patient to bring his home medication if he requires a daily.  In the interim, we will supply with what is available on the formulary.    Type 2 diabetes mellitus with chronic kidney disease, without long-term current use of insulin ([facility])  Assessment & Plan  Lab Results   Component Value Date    HGBA1C 8.0 (H) 06/12/2024       No results for input(s): \"POCGLU\" in the last 72 hours.    Blood Sugar Average: Last 72 hrs:    Medication includes Jardiance 25 mg daily, Trulicity 0.7 g weekly.  Patient refuses to have insulin in the inpatient setting.  Will hold Trulicity for now and continue Jardiance 20 mg daily.      Essential hypertension  Assessment & Plan  Blood Pressure: 148/97   Home medication includes hydrochlorothiazide 12.5, lisinopril 20 mg, Coreg 25 mg daily.    Continue medication regimen        Code Status: Level 1 - Full Code "     VTE Prophylaxis: Enoxaparin (Lovenox)  / sequential compression device     Anticipated Length of Stay:  Patient will be admitted on an Observation basis with an anticipated length of stay of then 2 midnights.   Justification for Hospital Stay: Dog bite cellulitis requiring IV antibiotics    Total Time for Visit, including Counseling / Coordination of Care: 76 minutes.  Greater than 50% of this total time spent on direct patient counseling and coordination of care.    Chief Complaint:     Chief Complaint   Patient presents with    Animal Bite     Pt states was bit by their dog last night on R wrist area. UTD on vaccines. States 9/10 pain. Bite appears swollen and red.       History of Present Illness:    Yoni Castillo is a 59 y.o. male past medical history notable for diabetes, left hand dog bite with deep infection 1/16/24 status post IV antibiotics and bedside I&D who presents with right hand dog bite today.  Patient reports that his dog bit him yesterday at 11 at night in his right upper extremity.  Patient reports that the dog bit and held onto him for period of a minute.  Afterwards, patient reports that he irrigated the wound with peroxide and water and cleansed it with topical antibiotics.  When patient woke up this morning, he states that he was in severe pain and noticing significant swelling and erythema.  He subsequently went to the ED to get evaluated.  Patient denies any fevers at home.  Patient reports that his dog is fully immunized.  Patient denies any chills or recent travels.  Patient and range of motion is limited due to pain.    In the ED, patient labs are within normal limits, x-ray showed no osseous abnormality.  Orthopedic hand was consulted and they recommended inpatient admission with IV antibiotics.    Review of Systems:    A complete and comprehensive 14 point organ system review was performed and all other systems are negative other than stated above in the HPI    Past Medical and  Surgical History:     Past Medical History:   Diagnosis Date    Chronic kidney disease 2012    Chronic pain disorder     Diabetes mellitus (HCC)     H/O eye surgery     High blood sugar     Hypertension     Kidney stone     Liver disease        Past Surgical History:   Procedure Laterality Date    GANGLION CYST EXCISION Left 3/25/2024    Procedure: EXCISION MUCOID CYST, INDEX FINGER DIP;  Surgeon: Carroll Mccarthy MD;  Location: [facility];  Service: Orthopedics    GANGLION CYST EXCISION Right 5/20/2024    Procedure: EXCISION MUCOID CYST - RIGHT INDEX AND MIDDLE FINGERS;  Surgeon: Carroll Mccarthy MD;  Location[facility];  Service: Orthopedics    HERNIA REPAIR      INCISION AND DRAINAGE  01/16/2024    KIDNEY SURGERY      KNEE SURGERY  1980    MOUTH SURGERY      SC AMPUTATION METATARSAL W/TOE SINGLE Left 6/1/2022    Procedure: RAY RESECTION FOOT;  Surgeon: Hannah Melgoza DPM;  Location: [facility];  Service: Podiatry    SC RPR AA HERNIA 1ST < 3 CM REDUCIBLE N/A 7/14/2023    Procedure: REPAIR HERNIA INCISIONAL;  Surgeon: Matt Melo MD;  [facility];  Service: General    WOUND DEBRIDEMENT Left 4/28/2022    Procedure: Left foot washout;  Surgeon: Hannah Melgoza DPM;  [facility];  Service: Podiatry    WOUND DEBRIDEMENT Left 6/6/2022    Procedure: DEBRIDEMENT WOUND (WASH OUT);  Surgeon: Hannah Melgoza DPM;  [facility];  Service: Podiatry       Meds/Allergies:    Prior to Admission medications    Medication Sig Start Date End Date Taking? Authorizing Provider   Accu-Chek FastClix Lancets MISC Use to test blood sugar once a day 6/5/23  Yes Juanita Lowe MD   acetaminophen (TYLENOL) 500 mg tablet Take 2 tablets (1,000 mg total) by mouth every 6 (six) hours as needed for mild pain  Patient taking differently: Take 2,500 mg by mouth 2 (two) times a day 5/20/24  Yes Yesenia Carrasquillo PA-C   Acetylcysteine (NAC PO) Take 300 mg by mouth 2 (two) times a day   Yes Historical  Provider, MD   Alpha-Lipoic Acid 600 MG CAPS Take 600 mg by mouth 2 (two) times a day     Yes Historical Provider, MD   ammonium lactate (LAC-HYDRIN) 12 % lotion  1/25/24  Yes Historical Provider, MD   Apple Cider Vinegar 300 MG TABS Take 300 mg by mouth 2 (two) times a day   Yes Historical Provider, MD   Ascorbic Acid (vitamin C) 1000 MG tablet Take 1,000 mg by mouth 2 (two) times a day 2 tablet twice a day   Yes Historical Provider, MD   BENFOTIAMINE PO Take 300 mg by mouth 2 (two) times a day 6/1/23  Yes Historical Provider, MD   beta carotene 30 MG capsule Take 30 mg by mouth 2 (two) times a day     Yes Historical Provider, MD   Blood Glucose Monitoring Suppl (Accu-Chek Guide Me) w/Device KIT Use to check blood sugar once a day 1/4/23  Yes Angelito Christine MD   carvedilol (COREG) 25 mg tablet Take 1 tablet (25 mg total) by mouth 2 (two) times a day with meals 8/20/24  Yes Judd Ji MD   Chromium Picolinate 200 MCG TABS Take 200 mcg by mouth 2 (two) times a day   Yes Historical Provider, MD   dulaglutide (Trulicity) 0.75 MG/0.5ML injection Inject 0.5 mL (0.75 mg total) under the skin every 7 days 9/8/24  Yes JAN Quinteros   Empagliflozin (Jardiance) 25 MG TABS Take 1 tablet (25 mg total) by mouth daily 8/20/24  Yes Juanita Lowe MD   Garlic 1200 MG CAPS Take by mouth 2 (two) times a day    Yes Historical Provider, MD   glucose blood (Accu-Chek Guide) test strip Use to check blood sugar once a day 6/5/23  Yes Juanita Lowe MD   IRON, FERROUS SULFATE, PO Take 25 mg by mouth 2 (two) times a day   Yes Historical Provider, MD   ketorolac (ACULAR) 0.5 % ophthalmic solution  8/22/23  Yes Historical Provider, MD   lidocaine (Lidoderm) 5 % Apply 1 patch topically over 12 hours daily Remove & Discard patch within 12 hours or as directed by MD 8/3/24  Yes Judd Ji MD   lisinopril-hydrochlorothiazide (PRINZIDE,ZESTORETIC) 20-12.5 MG per tablet Take 1 tablet by mouth 2 (two) times a day 8/20/24   Yes Judd Ji MD   Lutein-Bilberry (LUTEIN PLUS BILBERRY PO) Take by mouth 2 (two) times a day   Yes Historical Provider, MD   methocarbamol (ROBAXIN) 750 mg tablet Take 2 tablets (1,500 mg total) by mouth every 8 (eight) hours 9/9/24  Yes Néstor Thompson PA-C   Pyridoxine HCl (B-6 PO) Take by mouth 2 (two) times a day   Yes Historical Provider, MD   TART CHERRY PO Take 150 mg by mouth 2 (two) times a day   Yes Historical Provider, MD   traMADol (Ultram) 50 mg tablet 2 tablets in evening as needed for severe pain relief 8/20/24  Yes Judd Ji MD   Turmeric (QC TUMERIC COMPLEX PO) Take 1,000 mg by mouth 2 (two) times a day Tumeric /curcimin   Yes Historical Provider, MD   vitamin B-12 (CYANOCOBALAMIN) 100 MCG TABS Take 12,500 mcg by mouth 2 (two) times a day   Yes Historical Provider, MD   VITAMIN D PO Take by mouth 2 (two) times a day   Yes Historical Provider, MD   vitamin E, tocopherol, 400 units capsule Take 400 Units by mouth 2 (two) times a day   Yes Historical Provider, MD   Zinc 50 MG CAPS Take by mouth 2 (two) times a day    Yes Historical Provider, MD   docusate sodium (COLACE) 100 mg capsule Take 1 capsule (100 mg total) by mouth in the morning 5/20/24   Yesenia Carrasquillo PA-C   ketoconazole (NIZORAL) 2 % cream  1/25/24   Historical Provider, MD   methylPREDNISolone 4 MG tablet therapy pack Use as directed on package 3/12/24   Matt Graham MD   NON FORMULARY Take 150 mg by mouth 2 (two) times a day BLUEBERRY    Historical Provider, MD     I have reviewed home medications with patient personally.    Allergies:   Allergies   Allergen Reactions    Cephalexin Hives     Skin peeling  Tolerates penicillins (tolerated unasyn and zosyn)    Pollen Extract Sneezing    Metformin GI Intolerance       Social History:     Marital Status: Registered Domestic Partner   Occupation: Retired    Substance Use History:   Social History     Substance and Sexual Activity   Alcohol Use Yes     Alcohol/week: 1.0 standard drink of alcohol    Types: 1 Cans of beer per week    Comment: ocassional     Social History     Tobacco Use   Smoking Status Never   Smokeless Tobacco Never     Social History     Substance and Sexual Activity   Drug Use Never       Family History:    Family history non-contributory    Physical Exam:     Vitals:   Blood Pressure: 148/97 (09/11/24 1954)  Pulse: 88 (09/11/24 1954)  Temperature: 99.2 °F (37.3 °C) (09/11/24 1954)  Temp Source: Oral (09/11/24 1954)  Respirations: 18 (09/11/24 1850)  Height: 6' (182.9 cm) (09/11/24 1954)  Weight - Scale: 125 kg (275 lb 9.2 oz) (09/11/24 1954)  SpO2: 96 % (09/11/24 1954)      General: well appearing, no acute distress  HEENT: atraumatic, PERRLA, moist mucosa, normal pharynx, normal tonsils and adenoids, normal tongue, no fluid in sinuses  Neck: Trachea midline, no carotid bruit, no masses  Respiratory: normal chest wall expansion, CTA B, no r/r/w, no rubs  Cardiovascular: RRR, no m/r/g, Normal S1 and S2  Abdomen: Soft, non-tender, non-distended, normal bowel sounds in all quadrants, no hepatosplenomegaly, no tympany  Rectal: deferred  Musculoskeletal: normal ROM in upper and lower extremities  Integumentary: warm, dry, and pink, with no rash, purpura, or petechia  Heme/Lymph: no lymphadenopathy, no bruises  Neurological: Unable to fully make a fist with his hand due to pain.  Radial and ulnar pulses intact.  Sensation intact.  Psychiatric: cooperative with normal mood, affect, and cognition  Skin: Erythematous and red on the radial aspect.          Additional Data:     Lab Results: Laboratory studies reviewed for today    Results from last 7 days   Lab Units 09/11/24  1742   WBC Thousand/uL 9.12   HEMOGLOBIN g/dL 13.0   HEMATOCRIT % 38.6   PLATELETS Thousands/uL 211   SEGS PCT % 79*   LYMPHO PCT % 9*   MONO PCT % 10   EOS PCT % 2     Results from last 7 days   Lab Units 09/11/24  1742   SODIUM mmol/L 132*   POTASSIUM mmol/L 4.4   CHLORIDE mmol/L  99   CO2 mmol/L 26   BUN mg/dL 24   CREATININE mg/dL 1.34*   ANION GAP mmol/L 7   CALCIUM mg/dL 9.6   ALBUMIN g/dL 4.5   TOTAL BILIRUBIN mg/dL 0.60   ALK PHOS U/L 42   ALT U/L 19   AST U/L 17   GLUCOSE RANDOM mg/dL 179*                 Results from last 7 days   Lab Units 09/11/24  1742   LACTIC ACID mmol/L 1.0           Imaging: Reviewed radiology reports from this admission including: xray(s).    CT upper extremity w contrast right   Final Result by Antoine Galindo MD (09/11 1959)      Mild swelling of the volar wrist in keeping with provided history of dog bite. No fluid collection. No soft tissue gas.         Workstation performed: [id number]         XR hand 3+ views RIGHT   ED Interpretation by Yesenia Bedolla DO (09/11 1757)   No fracture or dislocation.  No subcutaneous air visualized          Final Result by Antoine Galindo MD (09/11 1959)      No acute osseous abnormality.         Computerized Assisted Algorithm (CAA) may have been used to analyze all applicable images.         Workstation performed: [id number]               [employer] Records Reviewed: Yes    ** Please Note: This note was completed in part utilizing [employer] Dragon [employer] One Software.  Grammatical errors, random word insertions, spelling mistakes, and incomplete sentences may be an occasional consequence of this system secondary to software limitations, ambient noise, and hardware issues.  If you have any questions or concerns about the content, text, or information contained within the body of this dictation, please contact the provider for clarification.**

## 2024-09-12 NOTE — PLAN OF CARE
Problem: PAIN - ADULT  Goal: Verbalizes/displays adequate comfort level or baseline comfort level  Description: Interventions:  - Encourage patient to monitor pain and request assistance  - Assess pain using appropriate pain scale  - Administer analgesics based on type and severity of pain and evaluate response  - Implement non-pharmacological measures as appropriate and evaluate response  - Consider cultural and social influences on pain and pain management  - Notify physician/advanced practitioner if interventions unsuccessful or patient reports new pain  Outcome: Progressing     Problem: INFECTION - ADULT  Goal: Absence or prevention of progression during hospitalization  Description: INTERVENTIONS:  - Assess and monitor for signs and symptoms of infection  - Monitor lab/diagnostic results  - Monitor all insertion sites, i.e. indwelling lines, tubes, and drains  - Monitor endotracheal if appropriate and nasal secretions for changes in amount and color  - [place] appropriate cooling/warming therapies per order  - Administer medications as ordered  - Instruct and encourage patient and family to use good hand hygiene technique  - Identify and instruct in appropriate isolation precautions for identified infection/condition  Outcome: Progressing     Problem: SAFETY ADULT  Goal: Patient will remain free of falls  Description: INTERVENTIONS:  - Educate patient/family on patient safety including physical limitations  - Instruct patient to call for assistance with activity   - Consult OT/PT to assist with strengthening/mobility   - Keep Call bell within reach  - Keep bed low and locked with side rails adjusted as appropriate  - Keep care items and personal belongings within reach  - Initiate and maintain comfort rounds  - Make Fall Risk Sign visible to staff  - Apply yellow socks and bracelet for high fall risk patients  - Consider moving patient to room near nurses station  Outcome: Progressing  Goal: Maintain or  return to baseline ADL function  Description: INTERVENTIONS:  -  Assess patient's ability to carry out ADLs; assess patient's baseline for ADL function and identify physical deficits which impact ability to perform ADLs (bathing, care of mouth/teeth, toileting, grooming, dressing, etc.)  - Assess/evaluate cause of self-care deficits   - Assess range of motion  - Assess patient's mobility; develop plan if impaired  - Assess patient's need for assistive devices and provide as appropriate  - Encourage maximum independence but intervene and supervise when necessary  - Involve family in performance of ADLs  - Assess for home care needs following discharge   - Consider OT consult to assist with ADL evaluation and planning for discharge  - Provide patient education as appropriate  Outcome: Progressing     Problem: DISCHARGE PLANNING  Goal: Discharge to home or other facility with appropriate resources  Description: INTERVENTIONS:  - Identify barriers to discharge w/patient and caregiver  - Arrange for needed discharge resources and transportation as appropriate  - Identify discharge learning needs (meds, wound care, etc.)  - Arrange for interpretive services to assist at discharge as needed  - Refer to Case Management Department for coordinating discharge planning if the patient needs post-hospital services based on physician/advanced practitioner order or complex needs related to functional status, cognitive ability, or social support system  Outcome: Progressing     Problem: Knowledge Deficit  Goal: Patient/family/caregiver demonstrates understanding of disease process, treatment plan, medications, and discharge instructions  Description: Complete learning assessment and assess knowledge base.  Interventions:  - Provide teaching at level of understanding  - Provide teaching via preferred learning methods  Outcome: Progressing     Problem: CARDIOVASCULAR - ADULT  Goal: Maintains optimal cardiac output and hemodynamic  stability  Description: INTERVENTIONS:  - Monitor I/O, vital signs and rhythm  - Monitor for S/S and trends of decreased cardiac output  - Administer and titrate ordered vasoactive medications to optimize hemodynamic stability  - Assess quality of pulses, skin color and temperature  - Assess for signs of decreased coronary artery perfusion  - Instruct patient to report change in severity of symptoms  Outcome: Progressing     Problem: RESPIRATORY - ADULT  Goal: Achieves optimal ventilation and oxygenation  Description: INTERVENTIONS:  - Assess for changes in respiratory status  - Assess for changes in mentation and behavior  - Position to facilitate oxygenation and minimize respiratory effort  - Oxygen administered by appropriate delivery if ordered  - Initiate smoking cessation education as indicated  - Encourage broncho-pulmonary hygiene including cough, deep breathe, Incentive Spirometry  - Assess the need for suctioning and aspirate as needed  - Assess and instruct to report SOB or any respiratory difficulty  - Respiratory Therapy support as indicated  Outcome: Progressing     Problem: GASTROINTESTINAL - ADULT  Goal: Minimal or absence of nausea and/or vomiting  Description: INTERVENTIONS:  - Administer IV fluids if ordered to ensure adequate hydration  - Maintain NPO status until nausea and vomiting are resolved  - Nasogastric tube if ordered  - Administer ordered antiemetic medications as needed  - Provide nonpharmacologic comfort measures as appropriate  - Advance diet as tolerated, if ordered  - Consider nutrition services referral to assist patient with adequate nutrition and appropriate food choices  Outcome: Progressing  Goal: Maintains or returns to baseline bowel function  Description: INTERVENTIONS:  - Assess bowel function  - Encourage oral fluids to ensure adequate hydration  - Administer IV fluids if ordered to ensure adequate hydration  - Administer ordered medications as needed  - Encourage  mobilization and activity  - Consider nutritional services referral to assist patient with adequate nutrition and appropriate food choices  Outcome: Progressing  Goal: Maintains adequate nutritional intake  Description: INTERVENTIONS:  - Monitor percentage of each meal consumed  - Identify factors contributing to decreased intake, treat as appropriate  - Assist with meals as needed  - Monitor I&O, weight, and lab values if indicated  - Obtain nutrition services referral as needed  Outcome: Progressing     Problem: GENITOURINARY - ADULT  Goal: Maintains or returns to baseline urinary function  Description: INTERVENTIONS:  - Assess urinary function  - Encourage oral fluids to ensure adequate hydration if ordered  - Administer IV fluids as ordered to ensure adequate hydration  - Administer ordered medications as needed  - Offer frequent toileting  - Follow urinary retention protocol if ordered  Outcome: Progressing  Goal: Absence of urinary retention  Description: INTERVENTIONS:  - Assess patient’s ability to void and empty bladder  - Monitor I/O  - Bladder scan as needed  - Discuss with physician/AP medications to alleviate retention as needed  - Discuss catheterization for long term situations as appropriate  Outcome: Progressing     Problem: METABOLIC, FLUID AND ELECTROLYTES - ADULT  Goal: Electrolytes maintained within normal limits  Description: INTERVENTIONS:  - Monitor labs and assess patient for signs and symptoms of electrolyte imbalances  - Administer electrolyte replacement as ordered  - Monitor response to electrolyte replacements, including repeat lab results as appropriate  - Instruct patient on fluid and nutrition as appropriate  Outcome: Progressing  Goal: Fluid balance maintained  Description: INTERVENTIONS:  - Monitor labs   - Monitor I/O and WT  - Instruct patient on fluid and nutrition as appropriate  - Assess for signs & symptoms of volume excess or deficit  Outcome: Progressing     Problem:  SKIN/TISSUE INTEGRITY - ADULT  Goal: Incision(s), wounds(s) or drain site(s) healing without S/S of infection  Description: INTERVENTIONS  - Assess and document dressing, incision, wound bed, drain sites and surrounding tissue  - Provide patient and family education  - Perform skin care/dressing changes   Outcome: Progressing     Problem: HEMATOLOGIC - ADULT  Goal: Maintains hematologic stability  Description: INTERVENTIONS  - Assess for signs and symptoms of bleeding or hemorrhage  - Monitor labs  - Administer supportive blood products/factors as ordered and appropriate  Outcome: Progressing     Problem: MUSCULOSKELETAL - ADULT  Goal: Maintain or return mobility to safest level of function  Description: INTERVENTIONS:  - Assess patient's ability to carry out ADLs; assess patient's baseline for ADL function and identify physical deficits which impact ability to perform ADLs (bathing, care of mouth/teeth, toileting, grooming, dressing, etc.)  - Assess/evaluate cause of self-care deficits   - Assess range of motion  - Assess patient's mobility  - Assess patient's need for assistive devices and provide as appropriate  - Encourage maximum independence but intervene and supervise when necessary  - Involve family in performance of ADLs  - Assess for home care needs following discharge   - Consider OT consult to assist with ADL evaluation and planning for discharge  - Provide patient education as appropriate  Outcome: Progressing

## 2024-09-13 PROBLEM — L03.113 CELLULITIS OF HAND, RIGHT: Status: ACTIVE | Noted: 2024-01-15

## 2024-09-13 LAB
ANION GAP SERPL CALCULATED.3IONS-SCNC: 6 MMOL/L (ref 4–13)
BUN SERPL-MCNC: 31 MG/DL (ref 5–25)
CALCIUM SERPL-MCNC: 8.8 MG/DL (ref 8.4–10.2)
CHLORIDE SERPL-SCNC: 104 MMOL/L (ref 96–108)
CO2 SERPL-SCNC: 27 MMOL/L (ref 21–32)
CREAT SERPL-MCNC: 1.72 MG/DL (ref 0.6–1.3)
ERYTHROCYTE [DISTWIDTH] IN BLOOD BY AUTOMATED COUNT: 13.9 % (ref 11.6–15.1)
GFR SERPL CREATININE-BSD FRML MDRD: 41 ML/MIN/1.73SQ M
GLUCOSE SERPL-MCNC: 131 MG/DL (ref 65–140)
GLUCOSE SERPL-MCNC: 134 MG/DL (ref 65–140)
GLUCOSE SERPL-MCNC: 154 MG/DL (ref 65–140)
GLUCOSE SERPL-MCNC: 160 MG/DL (ref 65–140)
GLUCOSE SERPL-MCNC: 180 MG/DL (ref 65–140)
HCT VFR BLD AUTO: 35.3 % (ref 36.5–49.3)
HGB BLD-MCNC: 11.5 G/DL (ref 12–17)
MCH RBC QN AUTO: 31.8 PG (ref 26.8–34.3)
MCHC RBC AUTO-ENTMCNC: 32.6 G/DL (ref 31.4–37.4)
MCV RBC AUTO: 98 FL (ref 82–98)
PLATELET # BLD AUTO: 184 THOUSANDS/UL (ref 149–390)
PMV BLD AUTO: 9.8 FL (ref 8.9–12.7)
POTASSIUM SERPL-SCNC: 4.4 MMOL/L (ref 3.5–5.3)
RBC # BLD AUTO: 3.62 MILLION/UL (ref 3.88–5.62)
SODIUM SERPL-SCNC: 137 MMOL/L (ref 135–147)
WBC # BLD AUTO: 6.46 THOUSAND/UL (ref 4.31–10.16)

## 2024-09-13 PROCEDURE — 99232 SBSQ HOSP IP/OBS MODERATE 35: CPT | Performed by: ORTHOPAEDIC SURGERY

## 2024-09-13 PROCEDURE — 80048 BASIC METABOLIC PNL TOTAL CA: CPT | Performed by: INTERNAL MEDICINE

## 2024-09-13 PROCEDURE — 99232 SBSQ HOSP IP/OBS MODERATE 35: CPT | Performed by: INTERNAL MEDICINE

## 2024-09-13 PROCEDURE — 85027 COMPLETE CBC AUTOMATED: CPT | Performed by: INTERNAL MEDICINE

## 2024-09-13 PROCEDURE — 82948 REAGENT STRIP/BLOOD GLUCOSE: CPT

## 2024-09-13 RX ORDER — MAGNESIUM HYDROXIDE/ALUMINUM HYDROXICE/SIMETHICONE 120; 1200; 1200 MG/30ML; MG/30ML; MG/30ML
30 SUSPENSION ORAL EVERY 4 HOURS PRN
Status: DISCONTINUED | OUTPATIENT
Start: 2024-09-13 | End: 2024-09-21 | Stop reason: HOSPADM

## 2024-09-13 RX ORDER — SODIUM CHLORIDE, SODIUM GLUCONATE, SODIUM ACETATE, POTASSIUM CHLORIDE, MAGNESIUM CHLORIDE, SODIUM PHOSPHATE, DIBASIC, AND POTASSIUM PHOSPHATE .53; .5; .37; .037; .03; .012; .00082 G/100ML; G/100ML; G/100ML; G/100ML; G/100ML; G/100ML; G/100ML
100 INJECTION, SOLUTION INTRAVENOUS CONTINUOUS
Status: DISCONTINUED | OUTPATIENT
Start: 2024-09-13 | End: 2024-09-18

## 2024-09-13 RX ORDER — ONDANSETRON 2 MG/ML
4 INJECTION INTRAMUSCULAR; INTRAVENOUS EVERY 4 HOURS PRN
Status: DISCONTINUED | OUTPATIENT
Start: 2024-09-13 | End: 2024-09-21 | Stop reason: HOSPADM

## 2024-09-13 RX ADMIN — ACETAMINOPHEN 975 MG: 325 TABLET ORAL at 06:09

## 2024-09-13 RX ADMIN — AMPICILLIN SODIUM AND SULBACTAM SODIUM 3 G: 2; 1 INJECTION, POWDER, FOR SOLUTION INTRAMUSCULAR; INTRAVENOUS at 18:04

## 2024-09-13 RX ADMIN — ACETAMINOPHEN 975 MG: 325 TABLET ORAL at 12:18

## 2024-09-13 RX ADMIN — AMPICILLIN SODIUM AND SULBACTAM SODIUM 3 G: 2; 1 INJECTION, POWDER, FOR SOLUTION INTRAMUSCULAR; INTRAVENOUS at 06:10

## 2024-09-13 RX ADMIN — LISINOPRIL 20 MG: 20 TABLET ORAL at 08:11

## 2024-09-13 RX ADMIN — MORPHINE SULFATE 4 MG: 4 INJECTION INTRAVENOUS at 08:12

## 2024-09-13 RX ADMIN — HYDROCHLOROTHIAZIDE 12.5 MG: 12.5 TABLET ORAL at 08:11

## 2024-09-13 RX ADMIN — OXYCODONE 5 MG: 5 TABLET ORAL at 06:09

## 2024-09-13 RX ADMIN — SODIUM CHLORIDE, SODIUM GLUCONATE, SODIUM ACETATE, POTASSIUM CHLORIDE, MAGNESIUM CHLORIDE, SODIUM PHOSPHATE, DIBASIC, AND POTASSIUM PHOSPHATE 100 ML/HR: .53; .5; .37; .037; .03; .012; .00082 INJECTION, SOLUTION INTRAVENOUS at 20:49

## 2024-09-13 RX ADMIN — EMPAGLIFLOZIN 20 MG: 10 TABLET, FILM COATED ORAL at 20:49

## 2024-09-13 RX ADMIN — OXYCODONE HYDROCHLORIDE AND ACETAMINOPHEN 1000 MG: 500 TABLET ORAL at 20:49

## 2024-09-13 RX ADMIN — AMPICILLIN SODIUM AND SULBACTAM SODIUM 3 G: 2; 1 INJECTION, POWDER, FOR SOLUTION INTRAMUSCULAR; INTRAVENOUS at 12:33

## 2024-09-13 RX ADMIN — MORPHINE SULFATE 4 MG: 4 INJECTION INTRAVENOUS at 01:13

## 2024-09-13 RX ADMIN — Medication 400 UNITS: at 18:04

## 2024-09-13 RX ADMIN — Medication 400 UNITS: at 08:14

## 2024-09-13 RX ADMIN — ACETAMINOPHEN 975 MG: 325 TABLET ORAL at 23:35

## 2024-09-13 RX ADMIN — OXYCODONE 5 MG: 5 TABLET ORAL at 14:03

## 2024-09-13 RX ADMIN — CARVEDILOL 25 MG: 25 TABLET, FILM COATED ORAL at 18:04

## 2024-09-13 RX ADMIN — OXYCODONE HYDROCHLORIDE AND ACETAMINOPHEN 1000 MG: 500 TABLET ORAL at 08:11

## 2024-09-13 RX ADMIN — MORPHINE SULFATE 4 MG: 4 INJECTION INTRAVENOUS at 18:04

## 2024-09-13 RX ADMIN — CARVEDILOL 25 MG: 25 TABLET, FILM COATED ORAL at 08:12

## 2024-09-13 RX ADMIN — AMPICILLIN SODIUM AND SULBACTAM SODIUM 3 G: 2; 1 INJECTION, POWDER, FOR SOLUTION INTRAMUSCULAR; INTRAVENOUS at 23:36

## 2024-09-13 NOTE — ASSESSMENT & PLAN NOTE
Dog bite of right hand with swelling and edema  Evaluated by orthopedics who recommends warm soapy baths 3 times daily.  No surgical intervention at this time.  Appears more swollen today  Continue Unasyn  Trend fever, WBC curve, exam

## 2024-09-13 NOTE — ASSESSMENT & PLAN NOTE
CKD stage IIIa at upper end of normal, patient suspect this is due to his n.p.o. status and not having his normal amount of fluid intake  Will start IV fluids overnight

## 2024-09-13 NOTE — ASSESSMENT & PLAN NOTE
We discussed reasoning behind use of sliding scale insulin for tightly controlled blood sugars to assist with wound healing  .  Patient understands and refuses sliding scale insulin.  At this point he does not have any hemodynamic compromise and no VIVIANA so is reasonable to continue Jardiance and monitor blood sugars  Continue cardiac diet

## 2024-09-13 NOTE — ASSESSMENT & PLAN NOTE
Lab Results   Component Value Date    HGBA1C 8.0 (H) 06/12/2024       Recent Labs     09/12/24  0728 09/12/24  1130 09/12/24  1602 09/12/24 2056   POCGLU 143* 189* 151* 150*       Blood Sugar Average: Last 72 hrs:  (P) 157.8    -Management per primary team encourage keeping blood glucose levels less than 180 to help control this infection.  Currently well-managed.

## 2024-09-13 NOTE — PLAN OF CARE
Problem: PAIN - ADULT  Goal: Verbalizes/displays adequate comfort level or baseline comfort level  Description: Interventions:  - Encourage patient to monitor pain and request assistance  - Assess pain using appropriate pain scale  - Administer analgesics based on type and severity of pain and evaluate response  - Implement non-pharmacological measures as appropriate and evaluate response  - Consider cultural and social influences on pain and pain management  - Notify physician/advanced practitioner if interventions unsuccessful or patient reports new pain  Outcome: Progressing     Problem: INFECTION - ADULT  Goal: Absence or prevention of progression during hospitalization  Description: INTERVENTIONS:  - Assess and monitor for signs and symptoms of infection  - Monitor lab/diagnostic results  - Monitor all insertion sites, i.e. indwelling lines, tubes, and drains  - Monitor endotracheal if appropriate and nasal secretions for changes in amount and color  - [place] appropriate cooling/warming therapies per order  - Administer medications as ordered  - Instruct and encourage patient and family to use good hand hygiene technique  - Identify and instruct in appropriate isolation precautions for identified infection/condition  Outcome: Progressing     Problem: SAFETY ADULT  Goal: Patient will remain free of falls  Description: INTERVENTIONS:  - Educate patient/family on patient safety including physical limitations  - Instruct patient to call for assistance with activity   - Consult OT/PT to assist with strengthening/mobility   - Keep Call bell within reach  - Keep bed low and locked with side rails adjusted as appropriate  - Keep care items and personal belongings within reach  - Initiate and maintain comfort rounds  - Make Fall Risk Sign visible to staff  - Apply yellow socks and bracelet for high fall risk patients  - Consider moving patient to room near nurses station  Outcome: Progressing  Goal: Maintain or  return to baseline ADL function  Description: INTERVENTIONS:  -  Assess patient's ability to carry out ADLs; assess patient's baseline for ADL function and identify physical deficits which impact ability to perform ADLs (bathing, care of mouth/teeth, toileting, grooming, dressing, etc.)  - Assess/evaluate cause of self-care deficits   - Assess range of motion  - Assess patient's mobility; develop plan if impaired  - Assess patient's need for assistive devices and provide as appropriate  - Encourage maximum independence but intervene and supervise when necessary  - Involve family in performance of ADLs  - Assess for home care needs following discharge   - Consider OT consult to assist with ADL evaluation and planning for discharge  - Provide patient education as appropriate  Outcome: Progressing     Problem: DISCHARGE PLANNING  Goal: Discharge to home or other facility with appropriate resources  Description: INTERVENTIONS:  - Identify barriers to discharge w/patient and caregiver  - Arrange for needed discharge resources and transportation as appropriate  - Identify discharge learning needs (meds, wound care, etc.)  - Arrange for interpretive services to assist at discharge as needed  - Refer to Case Management Department for coordinating discharge planning if the patient needs post-hospital services based on physician/advanced practitioner order or complex needs related to functional status, cognitive ability, or social support system  Outcome: Progressing     Problem: RESPIRATORY - ADULT  Goal: Achieves optimal ventilation and oxygenation  Description: INTERVENTIONS:  - Assess for changes in respiratory status  - Assess for changes in mentation and behavior  - Position to facilitate oxygenation and minimize respiratory effort  - Oxygen administered by appropriate delivery if ordered  - Initiate smoking cessation education as indicated  - Encourage broncho-pulmonary hygiene including cough, deep breathe, Incentive  Spirometry  - Assess the need for suctioning and aspirate as needed  - Assess and instruct to report SOB or any respiratory difficulty  - Respiratory Therapy support as indicated  Outcome: Progressing     Problem: GASTROINTESTINAL - ADULT  Goal: Minimal or absence of nausea and/or vomiting  Description: INTERVENTIONS:  - Administer IV fluids if ordered to ensure adequate hydration  - Maintain NPO status until nausea and vomiting are resolved  - Nasogastric tube if ordered  - Administer ordered antiemetic medications as needed  - Provide nonpharmacologic comfort measures as appropriate  - Advance diet as tolerated, if ordered  - Consider nutrition services referral to assist patient with adequate nutrition and appropriate food choices  Outcome: Progressing  Goal: Maintains or returns to baseline bowel function  Description: INTERVENTIONS:  - Assess bowel function  - Encourage oral fluids to ensure adequate hydration  - Administer IV fluids if ordered to ensure adequate hydration  - Administer ordered medications as needed  - Encourage mobilization and activity  - Consider nutritional services referral to assist patient with adequate nutrition and appropriate food choices  Outcome: Progressing  Goal: Maintains adequate nutritional intake  Description: INTERVENTIONS:  - Monitor percentage of each meal consumed  - Identify factors contributing to decreased intake, treat as appropriate  - Assist with meals as needed  - Monitor I&O, weight, and lab values if indicated  - Obtain nutrition services referral as needed  Outcome: Progressing     Problem: GENITOURINARY - ADULT  Goal: Maintains or returns to baseline urinary function  Description: INTERVENTIONS:  - Assess urinary function  - Encourage oral fluids to ensure adequate hydration if ordered  - Administer IV fluids as ordered to ensure adequate hydration  - Administer ordered medications as needed  - Offer frequent toileting  - Follow urinary retention protocol if  ordered  Outcome: Progressing  Goal: Absence of urinary retention  Description: INTERVENTIONS:  - Assess patient’s ability to void and empty bladder  - Monitor I/O  - Bladder scan as needed  - Discuss with physician/AP medications to alleviate retention as needed  - Discuss catheterization for long term situations as appropriate  Outcome: Progressing     Problem: METABOLIC, FLUID AND ELECTROLYTES - ADULT  Goal: Electrolytes maintained within normal limits  Description: INTERVENTIONS:  - Monitor labs and assess patient for signs and symptoms of electrolyte imbalances  - Administer electrolyte replacement as ordered  - Monitor response to electrolyte replacements, including repeat lab results as appropriate  - Instruct patient on fluid and nutrition as appropriate  Outcome: Progressing  Goal: Fluid balance maintained  Description: INTERVENTIONS:  - Monitor labs   - Monitor I/O and WT  - Instruct patient on fluid and nutrition as appropriate  - Assess for signs & symptoms of volume excess or deficit  Outcome: Progressing     Problem: SKIN/TISSUE INTEGRITY - ADULT  Goal: Incision(s), wounds(s) or drain site(s) healing without S/S of infection  Description: INTERVENTIONS  - Assess and document dressing, incision, wound bed, drain sites and surrounding tissue  - Provide patient and family education  - Perform skin care/dressing changes   Outcome: Progressing     Problem: HEMATOLOGIC - ADULT  Goal: Maintains hematologic stability  Description: INTERVENTIONS  - Assess for signs and symptoms of bleeding or hemorrhage  - Monitor labs  - Administer supportive blood products/factors as ordered and appropriate  Outcome: Progressing     Problem: MUSCULOSKELETAL - ADULT  Goal: Maintain or return mobility to safest level of function  Description: INTERVENTIONS:  - Assess patient's ability to carry out ADLs; assess patient's baseline for ADL function and identify physical deficits which impact ability to perform ADLs (bathing, care  of mouth/teeth, toileting, grooming, dressing, etc.)  - Assess/evaluate cause of self-care deficits   - Assess range of motion  - Assess patient's mobility  - Assess patient's need for assistive devices and provide as appropriate  - Encourage maximum independence but intervene and supervise when necessary  - Involve family in performance of ADLs  - Assess for home care needs following discharge   - Consider OT consult to assist with ADL evaluation and planning for discharge  - Provide patient education as appropriate  Outcome: Progressing

## 2024-09-13 NOTE — PROGRESS NOTES
Progress Note - Hospitalist   Name: Yoni Castillo 59 y.o. male I MRN: [id number]  Unit/Bed#: E5 -01 I Date of Admission: 9/11/2024   Date of Service: 9/13/2024 I Hospital Day: 1    Assessment & Plan  Essential hypertension  Continue home meds  Type 2 diabetes mellitus with chronic kidney disease, without long-term current use of insulin ([facility])  We discussed reasoning behind use of sliding scale insulin for tightly controlled blood sugars to assist with wound healing  .  Patient understands and refuses sliding scale insulin.  At this point he does not have any hemodynamic compromise and no VIVIANA so is reasonable to continue Jardiance and monitor blood sugars  Continue cardiac diet     Neuropathy  Patient currently on several vitamins including vitamin C, garlic capsule, vitamin EE for his peripheral neuropathy.    Chronic kidney disease, stage 3 ([facility])  CKD stage IIIa at upper end of normal, patient suspect this is due to his n.p.o. status and not having his normal amount of fluid intake  Will start IV fluids overnight  Arthritis  Home medication includes tramadol.  Will discontinue that for now, to avoid polypharmacy with opioid medications.  Continue IV morphine for his hand as needed.  Dog bite  Managed as above     Cellulitis of hand, right  Dog bite of right hand with swelling and edema  Evaluated by orthopedics who recommends warm soapy baths 3 times daily.  No surgical intervention at this time.  Appears more swollen today  Continue Unasyn  Trend fever, WBC curve, exam    VTE Pharmacologic Prophylaxis: lovenox     Patient Centered Rounds:  Patient care rounds were performed with nursing    Education and Discussions with Family / Patient: Patient     Time Spent for Care: I have spent a total time of 40 minutes on 09/13/24 in caring for this patient including Diagnostic results, Impressions, Documenting in the medical record, Reviewing / ordering tests, medicine, procedures  , Obtaining or reviewing history  ,  and Communicating with other healthcare professionals .      Current Length of Stay: 1 day(s)    Current Patient Status: Inpatient   Certification Statement: The patient will continue to require additional inpatient hospital stay due to need for IV ABX    Discharge Plan: 24-48 hours pending improvement     Code Status: Level 1 - Full Code      Subjective:   Patient seen and evaluated at bedside. Swelling is worse today. He continues to use soapy baths and warm compresses.     Objective:     Vitals:   Temp (24hrs), Av.8 °F (36.6 °C), Min:97.5 °F (36.4 °C), Max:97.9 °F (36.6 °C)    Temp:  [97.5 °F (36.4 °C)-97.9 °F (36.6 °C)] 97.9 °F (36.6 °C)  HR:  [68-78] 78  Resp:  [16-18] 16  BP: (128-138)/(75-82) 132/82  SpO2:  [95 %-96 %] 96 %  Body mass index is 37.37 kg/m².     Input and Output Summary (last 24 hours):     No intake or output data in the 24 hours ending 24 9915    Physical Exam:     Physical Exam  Vitals reviewed.   Constitutional:       General: He is not in acute distress.     Appearance: He is well-developed. He is not ill-appearing, toxic-appearing or diaphoretic.   HENT:      Head: Normocephalic and atraumatic.      Mouth/Throat:      Mouth: Mucous membranes are moist.   Eyes:      General: No scleral icterus.     Extraocular Movements: Extraocular movements intact.   Cardiovascular:      Rate and Rhythm: Normal rate and regular rhythm.      Heart sounds: Normal heart sounds.   Pulmonary:      Effort: Pulmonary effort is normal. No respiratory distress.      Breath sounds: Normal breath sounds. No wheezing or rales.   Abdominal:      General: There is no distension.      Palpations: Abdomen is soft.      Tenderness: There is no abdominal tenderness. There is no guarding or rebound.   Musculoskeletal:         General: No swelling, tenderness or deformity.   Skin:     General: Skin is warm and dry.   Neurological:      General: No focal deficit present.      Mental Status: He is alert. Mental  status is at baseline.   Psychiatric:         Mood and Affect: Mood normal.         Behavior: Behavior normal.         Thought Content: Thought content normal.         Judgment: Judgment normal.         Additional Data:     Labs: I have reviewed pertinent results     Results from last 7 days   Lab Units 09/13/24  0608 09/12/24  0851 09/12/24  0615   WBC Thousand/uL 6.46   < > 5.76   HEMOGLOBIN g/dL 11.5*   < > 9.9*   HEMATOCRIT % 35.3*   < > 28.5*   PLATELETS Thousands/uL 184   < > 156   SEGS PCT %  --   --  71   LYMPHO PCT %  --   --  14   MONO PCT %  --   --  13*   EOS PCT %  --   --  2    < > = values in this interval not displayed.     Results from last 7 days   Lab Units 09/13/24  0608 09/12/24  0851   SODIUM mmol/L 137 136   POTASSIUM mmol/L 4.4 4.3   CHLORIDE mmol/L 104 105   CO2 mmol/L 27 26   BUN mg/dL 31* 23   CREATININE mg/dL 1.72* 1.38*   ANION GAP mmol/L 6 5   CALCIUM mg/dL 8.8 8.8   ALBUMIN g/dL  --  3.9   TOTAL BILIRUBIN mg/dL  --  0.69   ALK PHOS U/L  --  33*   ALT U/L  --  14   AST U/L  --  13   GLUCOSE RANDOM mg/dL 131 130         Results from last 7 days   Lab Units 09/13/24  1603 09/13/24  1057 09/13/24  0749 09/12/24  2056 09/12/24  1602 09/12/24  1130 09/12/24  0728 09/11/24  2247   POC GLUCOSE mg/dl 154* 180* 134 150* 151* 189* 143* 156*         Results from last 7 days   Lab Units 09/11/24  1742   LACTIC ACID mmol/L 1.0         Imaging: I have reviewed pertinent imaging       Recent Cultures (last 7 days):           Last 24 Hours Medication List:   Current Facility-Administered Medications   Medication Dose Route Frequency Provider Last Rate    acetaminophen  975 mg Oral Q6H [facility] Baldev Jerez DO      aluminum-magnesium hydroxide-simethicone  30 mL Oral Q4H PRN Sean Morgan DO      ampicillin-sulbactam  3 g Intravenous Q6H Baldev Jerez DO 3 g (09/13/24 1233)    vitamin C  1,000 mg Oral BID Baldev Jerez DO      carvedilol  25 mg Oral BID With Meals  Baldev Jerez DO      Empagliflozin  20 mg Oral Daily Baldev Jerez DO      enoxaparin  40 mg Subcutaneous Daily Baldev Jerez DO      morphine injection  4 mg Intravenous Q6H PRN Vanessa Roger MD      multi-electrolyte  100 mL/hr Intravenous Continuous Sean Morgan DO      ondansetron  4 mg Intravenous Q4H PRN Sean Morgan DO      oxyCODONE  5 mg Oral Q4H PRN Vanessa Roger MD      vitamin E (tocopherol)  400 Units Oral BID Baldev Jerez DO          Today, Patient Was Seen By: Sean Morgan DO    ** Please Note: Dictation voice to text software may have been used in the creation of this document. **

## 2024-09-13 NOTE — ASSESSMENT & PLAN NOTE
- after dog bite, volar and dorsal  His erythema has shifted slightly distally today but is similar to evaluation yesterday.  No palpable soft tissue fluid collections.  There is pain with motion of the wrist or fingers but no significant pain with micromotion.  Low concern for septic arthritis or flexor tenosynovitis.  -His infections typically take longer to resolve likely due to his underlying diabetes.  Recommend continued antibiotics throughout the day today.  No indication for operative intervention at this time  -Continue with rest, IV antibiotics per primary and 3 times daily warm soapy soaks.

## 2024-09-13 NOTE — PROGRESS NOTES
Progress Note - Orthopedics   Name: Yoni Castillo 59 y.o. male I MRN: [id number]  Unit/Bed#: E5 -01 I Date of Admission: 9/11/2024   Date of Service: 9/13/2024 I Hospital Day: 1    Assessment & Plan  Cellulitis of hand, right  - after dog bite, volar and dorsal  His erythema has shifted slightly distally today but is similar to evaluation yesterday.  No palpable soft tissue fluid collections.  There is pain with motion of the wrist or fingers but no significant pain with micromotion.  Low concern for septic arthritis or flexor tenosynovitis.  -His infections typically take longer to resolve likely due to his underlying diabetes.  Recommend continued antibiotics throughout the day today.  No indication for operative intervention at this time  -Continue with rest, IV antibiotics per primary and 3 times daily warm soapy soaks.  Dog bite  -See above management  Essential hypertension  -Management per primary team    Type 2 diabetes mellitus with chronic kidney disease, without long-term current use of insulin ([facility])  Lab Results   Component Value Date    HGBA1C 8.0 (H) 06/12/2024       Recent Labs     09/12/24  0728 09/12/24  1130 09/12/24  1602 09/12/24 2056   POCGLU 143* 189* 151* 150*       Blood Sugar Average: Last 72 hrs:  (P) 157.8    -Management per primary team encourage keeping blood glucose levels less than 180 to help control this infection.  Currently well-managed.  Neuropathy  -Management per primary team  Chronic kidney disease, stage 3 ([facility])  Lab Results   Component Value Date    EGFR 54 09/12/2024    EGFR 57 09/12/2024    EGFR 56 09/11/2024    CREATININE 1.38 (H) 09/12/2024    CREATININE 1.33 (H) 09/12/2024    CREATININE 1.34 (H) 09/11/2024   -Management per primary team  Arthritis  -Activity as tolerated, doing well from previous DIP joint mucous cyst excisions        History of Present Illness   59 y.o.male  No acute events, no new complaints. Pain well controlled at rest but more painful with  any attempted movement.  No significant change since yesterday.  Denies fevers, chills, CP, SOB, N/V, numbness or tingling. Patient reports no issues with urination or bowel movements.    Objective      Temp:  [97.9 °F (36.6 °C)-99.6 °F (37.6 °C)] 97.9 °F (36.6 °C)  HR:  [71-89] 71  Resp:  [16-18] 16  BP: (110-140)/(74-82) 138/82  O2 Device: None (Room air)          No intake/output data recorded.  Lines/Drains/Airways       Active Status       None                  Physical Exam Musculoskeletal: rightupper  Punctate wounds over the dorsal and volar wrist/hand  Surrounding erythema primarily over the dorsal wrist and hand extending slightly more distally towards the ulnar hand today and less proximally  Proximal muscle compartments soft and compressible  Tender over the dorsal and volar wrist, nontender over the hand  Pain with wrist and finger range of motion but no pain with micromotion.  Limited range of motion of the fingers and only able to make 25% composite fist today  Warm well-perfused hand      Lab Results: I have reviewed the following results: CBC/BMP:   .     09/12/24  0851 09/13/24  0608   WBC 7.05 6.46   HGB 12.1 11.5*   HCT 35.7* 35.3*    184   SODIUM 136  --    K 4.3  --      --    CO2 26  --    BUN 23  --    CREATININE 1.38*  --    GLUC 130  --       Recent Labs     09/11/24  1742 09/12/24  0615 09/12/24  0851 09/13/24  0608   WBC 9.12 5.76 7.05 6.46   HGB 13.0 9.9* 12.1 11.5*   HCT 38.6 28.5* 35.7* 35.3*    156 207 184   BUN 24 20 23  --    CREATININE 1.34* 1.33* 1.38*  --      Blood Culture:    Lab Results   Component Value Date    BLOODCX No Growth After 5 Days. 11/28/2023     Wound Culture:   Lab Results   Component Value Date    WOUNDCULT 2+ Growth of 04/26/2024

## 2024-09-13 NOTE — ASSESSMENT & PLAN NOTE
Lab Results   Component Value Date    EGFR 54 09/12/2024    EGFR 57 09/12/2024    EGFR 56 09/11/2024    CREATININE 1.38 (H) 09/12/2024    CREATININE 1.33 (H) 09/12/2024    CREATININE 1.34 (H) 09/11/2024   -Management per primary team

## 2024-09-14 LAB
ANION GAP SERPL CALCULATED.3IONS-SCNC: 7 MMOL/L (ref 4–13)
BUN SERPL-MCNC: 34 MG/DL (ref 5–25)
CALCIUM SERPL-MCNC: 9 MG/DL (ref 8.4–10.2)
CHLORIDE SERPL-SCNC: 105 MMOL/L (ref 96–108)
CO2 SERPL-SCNC: 25 MMOL/L (ref 21–32)
CREAT SERPL-MCNC: 1.88 MG/DL (ref 0.6–1.3)
ERYTHROCYTE [DISTWIDTH] IN BLOOD BY AUTOMATED COUNT: 13.5 % (ref 11.6–15.1)
GFR SERPL CREATININE-BSD FRML MDRD: 37 ML/MIN/1.73SQ M
GLUCOSE SERPL-MCNC: 118 MG/DL (ref 65–140)
GLUCOSE SERPL-MCNC: 143 MG/DL (ref 65–140)
GLUCOSE SERPL-MCNC: 156 MG/DL (ref 65–140)
GLUCOSE SERPL-MCNC: 161 MG/DL (ref 65–140)
GLUCOSE SERPL-MCNC: 179 MG/DL (ref 65–140)
HCT VFR BLD AUTO: 34.1 % (ref 36.5–49.3)
HGB BLD-MCNC: 11.4 G/DL (ref 12–17)
MCH RBC QN AUTO: 32.1 PG (ref 26.8–34.3)
MCHC RBC AUTO-ENTMCNC: 33.4 G/DL (ref 31.4–37.4)
MCV RBC AUTO: 96 FL (ref 82–98)
PLATELET # BLD AUTO: 187 THOUSANDS/UL (ref 149–390)
PMV BLD AUTO: 9.7 FL (ref 8.9–12.7)
POTASSIUM SERPL-SCNC: 4.6 MMOL/L (ref 3.5–5.3)
RBC # BLD AUTO: 3.55 MILLION/UL (ref 3.88–5.62)
SODIUM SERPL-SCNC: 137 MMOL/L (ref 135–147)
WBC # BLD AUTO: 6.46 THOUSAND/UL (ref 4.31–10.16)

## 2024-09-14 PROCEDURE — 82948 REAGENT STRIP/BLOOD GLUCOSE: CPT

## 2024-09-14 PROCEDURE — 99232 SBSQ HOSP IP/OBS MODERATE 35: CPT | Performed by: PHYSICIAN ASSISTANT

## 2024-09-14 PROCEDURE — 85027 COMPLETE CBC AUTOMATED: CPT | Performed by: INTERNAL MEDICINE

## 2024-09-14 PROCEDURE — 99233 SBSQ HOSP IP/OBS HIGH 50: CPT | Performed by: INTERNAL MEDICINE

## 2024-09-14 PROCEDURE — 80048 BASIC METABOLIC PNL TOTAL CA: CPT | Performed by: INTERNAL MEDICINE

## 2024-09-14 RX ORDER — SENNOSIDES 8.6 MG
1 TABLET ORAL 2 TIMES DAILY
Status: DISCONTINUED | OUTPATIENT
Start: 2024-09-14 | End: 2024-09-21 | Stop reason: HOSPADM

## 2024-09-14 RX ADMIN — OXYCODONE HYDROCHLORIDE AND ACETAMINOPHEN 1000 MG: 500 TABLET ORAL at 08:23

## 2024-09-14 RX ADMIN — OXYCODONE 5 MG: 5 TABLET ORAL at 23:25

## 2024-09-14 RX ADMIN — Medication 400 UNITS: at 17:38

## 2024-09-14 RX ADMIN — Medication 400 UNITS: at 08:23

## 2024-09-14 RX ADMIN — ACETAMINOPHEN 975 MG: 325 TABLET ORAL at 05:33

## 2024-09-14 RX ADMIN — AMPICILLIN SODIUM AND SULBACTAM SODIUM 3 G: 2; 1 INJECTION, POWDER, FOR SOLUTION INTRAMUSCULAR; INTRAVENOUS at 23:25

## 2024-09-14 RX ADMIN — ACETAMINOPHEN 975 MG: 325 TABLET ORAL at 17:38

## 2024-09-14 RX ADMIN — SODIUM CHLORIDE, SODIUM GLUCONATE, SODIUM ACETATE, POTASSIUM CHLORIDE, MAGNESIUM CHLORIDE, SODIUM PHOSPHATE, DIBASIC, AND POTASSIUM PHOSPHATE 100 ML/HR: .53; .5; .37; .037; .03; .012; .00082 INJECTION, SOLUTION INTRAVENOUS at 05:25

## 2024-09-14 RX ADMIN — AMPICILLIN SODIUM AND SULBACTAM SODIUM 3 G: 2; 1 INJECTION, POWDER, FOR SOLUTION INTRAMUSCULAR; INTRAVENOUS at 12:08

## 2024-09-14 RX ADMIN — AMPICILLIN SODIUM AND SULBACTAM SODIUM 3 G: 2; 1 INJECTION, POWDER, FOR SOLUTION INTRAMUSCULAR; INTRAVENOUS at 17:38

## 2024-09-14 RX ADMIN — CARVEDILOL 25 MG: 25 TABLET, FILM COATED ORAL at 08:23

## 2024-09-14 RX ADMIN — AMPICILLIN SODIUM AND SULBACTAM SODIUM 3 G: 2; 1 INJECTION, POWDER, FOR SOLUTION INTRAMUSCULAR; INTRAVENOUS at 05:28

## 2024-09-14 RX ADMIN — SENNOSIDES 8.6 MG: 8.6 TABLET, FILM COATED ORAL at 17:38

## 2024-09-14 RX ADMIN — SENNOSIDES 8.6 MG: 8.6 TABLET, FILM COATED ORAL at 12:08

## 2024-09-14 RX ADMIN — CARVEDILOL 25 MG: 25 TABLET, FILM COATED ORAL at 17:38

## 2024-09-14 RX ADMIN — ACETAMINOPHEN 975 MG: 325 TABLET ORAL at 12:09

## 2024-09-14 RX ADMIN — OXYCODONE HYDROCHLORIDE AND ACETAMINOPHEN 1000 MG: 500 TABLET ORAL at 23:25

## 2024-09-14 NOTE — ASSESSMENT & PLAN NOTE
Lab Results   Component Value Date    HGBA1C 8.0 (H) 06/12/2024       Recent Labs     09/13/24  1603 09/13/24  2044 09/14/24  0740 09/14/24  1112   POCGLU 154* 160* 143* 179*       Blood Sugar Average: Last 72 hrs:  (P) 158.1116438318634294    -Management per primary team encourage keeping blood glucose levels less than 180 to help control this infection.  Currently well-managed.

## 2024-09-14 NOTE — PLAN OF CARE
Problem: PAIN - ADULT  Goal: Verbalizes/displays adequate comfort level or baseline comfort level  Description: Interventions:  - Encourage patient to monitor pain and request assistance  - Assess pain using appropriate pain scale  - Administer analgesics based on type and severity of pain and evaluate response  - Implement non-pharmacological measures as appropriate and evaluate response  - Consider cultural and social influences on pain and pain management  - Notify physician/advanced practitioner if interventions unsuccessful or patient reports new pain  Outcome: Progressing     Problem: INFECTION - ADULT  Goal: Absence or prevention of progression during hospitalization  Description: INTERVENTIONS:  - Assess and monitor for signs and symptoms of infection  - Monitor lab/diagnostic results  - Monitor all insertion sites, i.e. indwelling lines, tubes, and drains  - Monitor endotracheal if appropriate and nasal secretions for changes in amount and color  - [place]o appropriate cooling/warming therapies per order  - Administer medications as ordered  - Instruct and encourage patient and family to use good hand hygiene technique  - Identify and instruct in appropriate isolation precautions for identified infection/condition  Outcome: Progressing     Problem: SAFETY ADULT  Goal: Patient will remain free of falls  Description: INTERVENTIONS:  - Educate patient/family on patient safety including physical limitations  - Instruct patient to call for assistance with activity   - Consult OT/PT to assist with strengthening/mobility   - Keep Call bell within reach  - Keep bed low and locked with side rails adjusted as appropriate  - Keep care items and personal belongings within reach  - Initiate and maintain comfort rounds  - Make Fall Risk Sign visible to staff  - Apply yellow socks and bracelet for high fall risk patients  - Consider moving patient to room near nurses station  Outcome: Progressing  Goal: Maintain or  return to baseline ADL function  Description: INTERVENTIONS:  -  Assess patient's ability to carry out ADLs; assess patient's baseline for ADL function and identify physical deficits which impact ability to perform ADLs (bathing, care of mouth/teeth, toileting, grooming, dressing, etc.)  - Assess/evaluate cause of self-care deficits   - Assess range of motion  - Assess patient's mobility; develop plan if impaired  - Assess patient's need for assistive devices and provide as appropriate  - Encourage maximum independence but intervene and supervise when necessary  - Involve family in performance of ADLs  - Assess for home care needs following discharge   - Consider OT consult to assist with ADL evaluation and planning for discharge  - Provide patient education as appropriate  Outcome: Progressing     Problem: DISCHARGE PLANNING  Goal: Discharge to home or other facility with appropriate resources  Description: INTERVENTIONS:  - Identify barriers to discharge w/patient and caregiver  - Arrange for needed discharge resources and transportation as appropriate  - Identify discharge learning needs (meds, wound care, etc.)  - Arrange for interpretive services to assist at discharge as needed  - Refer to Case Management Department for coordinating discharge planning if the patient needs post-hospital services based on physician/advanced practitioner order or complex needs related to functional status, cognitive ability, or social support system  Outcome: Progressing     Problem: Knowledge Deficit  Goal: Patient/family/caregiver demonstrates understanding of disease process, treatment plan, medications, and discharge instructions  Description: Complete learning assessment and assess knowledge base.  Interventions:  - Provide teaching at level of understanding  - Provide teaching via preferred learning methods  Outcome: Progressing     Problem: CARDIOVASCULAR - ADULT  Goal: Maintains optimal cardiac output and hemodynamic  stability  Description: INTERVENTIONS:  - Monitor I/O, vital signs and rhythm  - Monitor for S/S and trends of decreased cardiac output  - Administer and titrate ordered vasoactive medications to optimize hemodynamic stability  - Assess quality of pulses, skin color and temperature  - Assess for signs of decreased coronary artery perfusion  - Instruct patient to report change in severity of symptoms  Outcome: Progressing     Problem: RESPIRATORY - ADULT  Goal: Achieves optimal ventilation and oxygenation  Description: INTERVENTIONS:  - Assess for changes in respiratory status  - Assess for changes in mentation and behavior  - Position to facilitate oxygenation and minimize respiratory effort  - Oxygen administered by appropriate delivery if ordered  - Initiate smoking cessation education as indicated  - Encourage broncho-pulmonary hygiene including cough, deep breathe, Incentive Spirometry  - Assess the need for suctioning and aspirate as needed  - Assess and instruct to report SOB or any respiratory difficulty  - Respiratory Therapy support as indicated  Outcome: Progressing     Problem: GASTROINTESTINAL - ADULT  Goal: Minimal or absence of nausea and/or vomiting  Description: INTERVENTIONS:  - Administer IV fluids if ordered to ensure adequate hydration  - Maintain NPO status until nausea and vomiting are resolved  - Nasogastric tube if ordered  - Administer ordered antiemetic medications as needed  - Provide nonpharmacologic comfort measures as appropriate  - Advance diet as tolerated, if ordered  - Consider nutrition services referral to assist patient with adequate nutrition and appropriate food choices  Outcome: Progressing  Goal: Maintains or returns to baseline bowel function  Description: INTERVENTIONS:  - Assess bowel function  - Encourage oral fluids to ensure adequate hydration  - Administer IV fluids if ordered to ensure adequate hydration  - Administer ordered medications as needed  - Encourage  mobilization and activity  - Consider nutritional services referral to assist patient with adequate nutrition and appropriate food choices  Outcome: Progressing  Goal: Maintains adequate nutritional intake  Description: INTERVENTIONS:  - Monitor percentage of each meal consumed  - Identify factors contributing to decreased intake, treat as appropriate  - Assist with meals as needed  - Monitor I&O, weight, and lab values if indicated  - Obtain nutrition services referral as needed  Outcome: Progressing     Problem: GENITOURINARY - ADULT  Goal: Maintains or returns to baseline urinary function  Description: INTERVENTIONS:  - Assess urinary function  - Encourage oral fluids to ensure adequate hydration if ordered  - Administer IV fluids as ordered to ensure adequate hydration  - Administer ordered medications as needed  - Offer frequent toileting  - Follow urinary retention protocol if ordered  Outcome: Progressing  Goal: Absence of urinary retention  Description: INTERVENTIONS:  - Assess patient’s ability to void and empty bladder  - Monitor I/O  - Bladder scan as needed  - Discuss with physician/AP medications to alleviate retention as needed  - Discuss catheterization for long term situations as appropriate  Outcome: Progressing     Problem: METABOLIC, FLUID AND ELECTROLYTES - ADULT  Goal: Electrolytes maintained within normal limits  Description: INTERVENTIONS:  - Monitor labs and assess patient for signs and symptoms of electrolyte imbalances  - Administer electrolyte replacement as ordered  - Monitor response to electrolyte replacements, including repeat lab results as appropriate  - Instruct patient on fluid and nutrition as appropriate  Outcome: Progressing  Goal: Fluid balance maintained  Description: INTERVENTIONS:  - Monitor labs   - Monitor I/O and WT  - Instruct patient on fluid and nutrition as appropriate  - Assess for signs & symptoms of volume excess or deficit  Outcome: Progressing     Problem:  SKIN/TISSUE INTEGRITY - ADULT  Goal: Incision(s), wounds(s) or drain site(s) healing without S/S of infection  Description: INTERVENTIONS  - Assess and document dressing, incision, wound bed, drain sites and surrounding tissue  - Provide patient and family education  - Perform skin care/dressing changes   Outcome: Progressing     Problem: HEMATOLOGIC - ADULT  Goal: Maintains hematologic stability  Description: INTERVENTIONS  - Assess for signs and symptoms of bleeding or hemorrhage  - Monitor labs  - Administer supportive blood products/factors as ordered and appropriate  Outcome: Progressing     Problem: MUSCULOSKELETAL - ADULT  Goal: Maintain or return mobility to safest level of function  Description: INTERVENTIONS:  - Assess patient's ability to carry out ADLs; assess patient's baseline for ADL function and identify physical deficits which impact ability to perform ADLs (bathing, care of mouth/teeth, toileting, grooming, dressing, etc.)  - Assess/evaluate cause of self-care deficits   - Assess range of motion  - Assess patient's mobility  - Assess patient's need for assistive devices and provide as appropriate  - Encourage maximum independence but intervene and supervise when necessary  - Involve family in performance of ADLs  - Assess for home care needs following discharge   - Consider OT consult to assist with ADL evaluation and planning for discharge  - Provide patient education as appropriate  Outcome: Progressing

## 2024-09-14 NOTE — PROGRESS NOTES
Progress Note - Hospitalist   Name: Yoni Castillo 59 y.o. male I MRN: [id number]  Unit/Bed#: E5 -01 I Date of Admission: 9/11/2024   Date of Service: 9/14/2024 I Hospital Day: 2    Assessment & Plan  Essential hypertension  Continue carvedilol  Hold lisinopril/hydrochlorothiazide with elevated creatinine  Type 2 diabetes mellitus with chronic kidney disease, without long-term current use of insulin ([facility])  We discussed reasoning behind use of sliding scale insulin for tightly controlled blood sugars to assist with wound healing  .  Patient understands and refuses sliding scale insulin.  Hold Jardiance with worsening creatinine  Continue diabetic diet    Neuropathy  Patient currently on several vitamins including vitamin C, garlic capsule, vitamin EE for his peripheral neuropathy.    Chronic kidney disease, stage 3 ([facility])  CKD stage IIIa at upper end of normal, patient suspect this is due to his n.p.o. status and not having his normal amount of fluid intake  Continues to rise  Will continue IV fluids  Hold nephrotoxins  Dog bite  Managed as above     Cellulitis of hand, right  Dog bite of right hand with swelling and edema  Evaluated by orthopedics who recommends warm soapy baths 3 times daily.  No surgical intervention at this time.  Patient reports typically needing prolonged course of antibiotics  Suspect he will need IV antibiotics through the weekend and transition to Augmentin on Monday if stable  Trend fever, WBC curve, exam        VTE Pharmacologic Prophylaxis: lovenox     Patient Centered Rounds:  Patient care rounds were performed with nursing    Education and Discussions with Family / Patient: Patient     Time Spent for Care: I have spent a total time of 51 minutes on 09/14/24 in caring for this patient including Diagnostic results, Impressions, Counseling / Coordination of care, Documenting in the medical record, Reviewing / ordering tests, medicine, procedures  , Obtaining or reviewing history  , and  Communicating with other healthcare professionals .      Current Length of Stay: 2 day(s)    Current Patient Status: Inpatient   Certification Statement: The patient will continue to require additional inpatient hospital stay due to need for IV abx     Discharge Plan: Suspect Monday     Code Status: Level 1 - Full Code      Subjective:   Patient seen and evaluated at bedside. Having some draining from wrist wounds. Expressed some serosanguinous fluid today on exam.     Objective:     Vitals:   Temp (24hrs), Av.1 °F (36.7 °C), Min:97.9 °F (36.6 °C), Max:98.4 °F (36.9 °C)    Temp:  [97.9 °F (36.6 °C)-98.4 °F (36.9 °C)] 98.4 °F (36.9 °C)  HR:  [71-89] 89  Resp:  [15-18] 18  BP: (132-153)/(82-91) 153/91  SpO2:  [95 %-96 %] 95 %  Body mass index is 37.37 kg/m².     Input and Output Summary (last 24 hours):       Intake/Output Summary (Last 24 hours) at 2024 1248  Last data filed at 2024 1240  Gross per 24 hour   Intake 720 ml   Output --   Net 720 ml       Physical Exam:     Physical Exam  Vitals reviewed.   Constitutional:       General: He is not in acute distress.     Appearance: He is well-developed. He is not ill-appearing, toxic-appearing or diaphoretic.   HENT:      Head: Normocephalic and atraumatic.      Mouth/Throat:      Mouth: Mucous membranes are moist.   Eyes:      General: No scleral icterus.     Extraocular Movements: Extraocular movements intact.   Cardiovascular:      Rate and Rhythm: Normal rate and regular rhythm.      Heart sounds: Normal heart sounds.   Pulmonary:      Effort: Pulmonary effort is normal. No respiratory distress.      Breath sounds: Normal breath sounds. No wheezing or rales.   Abdominal:      General: There is no distension.      Palpations: Abdomen is soft.      Tenderness: There is no abdominal tenderness. There is no guarding or rebound.   Musculoskeletal:         General: No swelling, tenderness or deformity.   Skin:     Comments: Slight improvement in erythema and  swelling of right upper extremity   Neurological:      General: No focal deficit present.      Mental Status: He is alert.   Psychiatric:         Mood and Affect: Mood normal.         Behavior: Behavior normal.         Thought Content: Thought content normal.         Judgment: Judgment normal.         Additional Data:     Labs: I have reviewed pertinent results     Results from last 7 days   Lab Units 09/14/24  0456 09/12/24  0851 09/12/24  0615   WBC Thousand/uL 6.46   < > 5.76   HEMOGLOBIN g/dL 11.4*   < > 9.9*   HEMATOCRIT % 34.1*   < > 28.5*   PLATELETS Thousands/uL 187   < > 156   SEGS PCT %  --   --  71   LYMPHO PCT %  --   --  14   MONO PCT %  --   --  13*   EOS PCT %  --   --  2    < > = values in this interval not displayed.     Results from last 7 days   Lab Units 09/14/24  0456 09/13/24  0608 09/12/24  0851   SODIUM mmol/L 137   < > 136   POTASSIUM mmol/L 4.6   < > 4.3   CHLORIDE mmol/L 105   < > 105   CO2 mmol/L 25   < > 26   BUN mg/dL 34*   < > 23   CREATININE mg/dL 1.88*   < > 1.38*   ANION GAP mmol/L 7   < > 5   CALCIUM mg/dL 9.0   < > 8.8   ALBUMIN g/dL  --   --  3.9   TOTAL BILIRUBIN mg/dL  --   --  0.69   ALK PHOS U/L  --   --  33*   ALT U/L  --   --  14   AST U/L  --   --  13   GLUCOSE RANDOM mg/dL 161*   < > 130    < > = values in this interval not displayed.         Results from last 7 days   Lab Units 09/14/24  1112 09/14/24  0740 09/13/24  2044 09/13/24  1603 09/13/24  1057 09/13/24  0749 09/12/24  2056 09/12/24  1602 09/12/24  1130 09/12/24  0728 09/11/24  2247   POC GLUCOSE mg/dl 179* 143* 160* 154* 180* 134 150* 151* 189* 143* 156*         Results from last 7 days   Lab Units 09/11/24  1742   LACTIC ACID mmol/L 1.0         Imaging: I have reviewed pertinent imaging       Recent Cultures (last 7 days):           Last 24 Hours Medication List:   Current Facility-Administered Medications   Medication Dose Route Frequency Provider Last Rate    acetaminophen  975 mg Oral Q6H WILTON Vallecillo  DO [facility]      aluminum-magnesium hydroxide-simethicone  30 mL Oral Q4H PRN Sean Morgan DO      ampicillin-sulbactam  3 g Intravenous Q6H Baldev Jerez DO 3 g (09/14/24 1208)    vitamin C  1,000 mg Oral BID Baldev Jerez DO      carvedilol  25 mg Oral BID With Meals Baldev Jerez DO      enoxaparin  40 mg Subcutaneous Daily Baldev Jerez DO      morphine injection  4 mg Intravenous Q6H PRN Vanessa Roger MD      multi-electrolyte  100 mL/hr Intravenous Continuous Sean Morgan  mL/hr (09/14/24 0525)    ondansetron  4 mg Intravenous Q4H PRN Sean Morgan DO      oxyCODONE  5 mg Oral Q4H PRN Vanessa Roger MD      senna  1 tablet Oral BID Sean Morgan DO      vitamin E (tocopherol)  400 Units Oral BID Baldev Jerez DO          Today, Patient Was Seen By: Sean Morgan DO    ** Please Note: Dictation voice to text software may have been used in the creation of this document. **

## 2024-09-14 NOTE — ASSESSMENT & PLAN NOTE
CKD stage IIIa at upper end of normal, patient suspect this is due to his n.p.o. status and not having his normal amount of fluid intake  Continues to rise  Will continue IV fluids  Hold nephrotoxins

## 2024-09-14 NOTE — PROGRESS NOTES
Progress Note - Orthopedics   Name: Yoni Castillo 59 y.o. male I MRN: [id number]  Unit/Bed#: E5 -01 I Date of Admission: 9/11/2024   Date of Service: 9/14/2024 I Hospital Day: 2     Assessment & Plan  Cellulitis of hand, right  after dog bite, volar and dorsal  Erythema and swelling is still present today, mostly concentrated to the dorsal aspect of the hand and wrist. Per patient he denies any change, same degree of redness and swelling  Recommend to continue IV Abx per primary team at this time. Patient will be re-evaluated today for possible consideration to transition to oral Abx.  Continue warm soapy soaks 3 times daily   Continue Strict elevation to the RUE and ice to the area  Pain control PRN  Medical management per primary   Ortho will continue to follow     Dog bite  -See above management  Essential hypertension  -Management per primary team    Type 2 diabetes mellitus with chronic kidney disease, without long-term current use of insulin ([facility])  Lab Results   Component Value Date    HGBA1C 8.0 (H) 06/12/2024       Recent Labs     09/13/24  1603 09/13/24  2044 09/14/24  0740 09/14/24  1112   POCGLU 154* 160* 143* 179*       Blood Sugar Average: Last 72 hrs:  (P) 158.8052646314825627    -Management per primary team encourage keeping blood glucose levels less than 180 to help control this infection.  Currently well-managed.  Neuropathy  -Management per primary team  Chronic kidney disease, stage 3 ([facility])  Lab Results   Component Value Date    EGFR 37 09/14/2024    EGFR 41 09/13/2024    EGFR 54 09/12/2024    CREATININE 1.88 (H) 09/14/2024    CREATININE 1.72 (H) 09/13/2024    CREATININE 1.38 (H) 09/12/2024   -Management per primary team      History of Present Illness   59 y.o.male with right hand cellulitis was seen and examined at bedside.  There were no acute events or complaints overnight.  Patient reports that he still has pain within the right hand and wrist.  He denies having any pain at rest but  does have pain with range of motion.  Patient [place] any fevers chills or sweats at this time.  Patient reports that the degree of redness, the area of redness, and the amount of swelling present on the dorsal aspect of the hand and wrist is similar compared to yesterday.  Patient denies any change to this area.  He denies any numbness or tingling in the right upper extremity.      Objective      Temp:  [97.9 °F (36.6 °C)-98.4 °F (36.9 °C)] 98.4 °F (36.9 °C)  HR:  [71-89] 89  Resp:  [15-18] 18  BP: (132-153)/(82-91) 153/91  O2 Device: None (Room air)          I/O         09/12 0701  09/13 0700 09/13 0701  09/14 0700 09/14 0701  09/15 0700    P.O.   720    IV Piggyback       Total Intake(mL/kg)   720 (5.8)    Urine (mL/kg/hr)       Total Output       Net   +720                 Lines/Drains/Airways       Active Status       None                  Physical Exam  Constitutional:       Appearance: Normal appearance.   HENT:      Head: Normocephalic and atraumatic.      Right Ear: External ear normal.      Left Ear: External ear normal.      Nose: Nose normal.      Mouth/Throat:      Mouth: Mucous membranes are dry.   Eyes:      Extraocular Movements: Extraocular movements intact.      Conjunctiva/sclera: Conjunctivae normal.      Pupils: Pupils are equal, round, and reactive to light.   Cardiovascular:      Comments: No apparent distress  Pulmonary:      Effort: Pulmonary effort is normal.   Abdominal:      General: Abdomen is flat.   Musculoskeletal:      Cervical back: Normal range of motion and neck supple.   Skin:     General: Skin is warm and dry.      Capillary Refill: Capillary refill takes less than 2 seconds.   Neurological:      General: No focal deficit present.      Mental Status: He is alert and oriented to person, place, and time.   Psychiatric:         Mood and Affect: Mood normal.         Behavior: Behavior normal.       Musculoskeletal:  Right Upper Extremity Exam  Inspection: Small puncture wounds are  present on the dorsal and volar aspect of the wrist and hand that are scabbed over.  There is erythema surrounding the puncture wounds on the dorsal aspect of the wrist and hand.  There continues to be surrounding erythema to this area as well as swelling as well.  There is no erythema or redness to all 5 fingers or from the mid forearm up.  Palpation: Tenderness to palpation about the dorsal and volar aspect of the wrist and hand.  No tenderness to palpation about the fingers.  ROM: Patient has limited range of motion due to swelling and pain and only able to make about 25% composite fist.  There is pain with passive range of motion of the wrist and hand but no pain with micromotion.  Neurovascular:  Sensation intact in Ax/R/M/U nerve distributions. 2+ radial pulse. Brisk capillary refill in all fingertips.          Lab Results: I have reviewed the following results:   Recent Labs     09/12/24  0851 09/13/24  0608 09/14/24  0456   WBC 7.05 6.46 6.46   HGB 12.1 11.5* 11.4*   HCT 35.7* 35.3* 34.1*    184 187   BUN 23 31* 34*   CREATININE 1.38* 1.72* 1.88*     Blood Culture:    Lab Results   Component Value Date    BLOODCX No Growth After 5 Days. 11/28/2023     Wound Culture:   Lab Results   Component Value Date    WOUNDCULT 2+ Growth of 04/26/2024       Deyanira Adams PA-C

## 2024-09-14 NOTE — ASSESSMENT & PLAN NOTE
We discussed reasoning behind use of sliding scale insulin for tightly controlled blood sugars to assist with wound healing  .  Patient understands and refuses sliding scale insulin.  Hold Jardiance with worsening creatinine  Continue diabetic diet

## 2024-09-14 NOTE — ASSESSMENT & PLAN NOTE
Lab Results   Component Value Date    EGFR 37 09/14/2024    EGFR 41 09/13/2024    EGFR 54 09/12/2024    CREATININE 1.88 (H) 09/14/2024    CREATININE 1.72 (H) 09/13/2024    CREATININE 1.38 (H) 09/12/2024   -Management per primary team

## 2024-09-14 NOTE — PLAN OF CARE
Problem: PAIN - ADULT  Goal: Verbalizes/displays adequate comfort level or baseline comfort level  Description: Interventions:  - Encourage patient to monitor pain and request assistance  - Assess pain using appropriate pain scale  - Administer analgesics based on type and severity of pain and evaluate response  - Implement non-pharmacological measures as appropriate and evaluate response  - Consider cultural and social influences on pain and pain management  - Notify physician/advanced practitioner if interventions unsuccessful or patient reports new pain  Outcome: Progressing     Problem: INFECTION - ADULT  Goal: Absence or prevention of progression during hospitalization  Description: INTERVENTIONS:  - Assess and monitor for signs and symptoms of infection  - Monitor lab/diagnostic results  - Monitor all insertion sites, i.e. indwelling lines, tubes, and drains  - Monitor endotracheal if appropriate and nasal secretions for changes in amount and color  - [place] appropriate cooling/warming therapies per order  - Administer medications as ordered  - Instruct and encourage patient and family to use good hand hygiene technique  - Identify and instruct in appropriate isolation precautions for identified infection/condition  Outcome: Progressing     Problem: SAFETY ADULT  Goal: Patient will remain free of falls  Description: INTERVENTIONS:  - Educate patient/family on patient safety including physical limitations  - Instruct patient to call for assistance with activity   - Consult OT/PT to assist with strengthening/mobility   - Keep Call bell within reach  - Keep bed low and locked with side rails adjusted as appropriate  - Keep care items and personal belongings within reach  - Initiate and maintain comfort rounds  - Make Fall Risk Sign visible to staff  - Apply yellow socks and bracelet for high fall risk patients  - Consider moving patient to room near nurses station  Outcome: Progressing  Goal: Maintain or  return to baseline ADL function  Description: INTERVENTIONS:  -  Assess patient's ability to carry out ADLs; assess patient's baseline for ADL function and identify physical deficits which impact ability to perform ADLs (bathing, care of mouth/teeth, toileting, grooming, dressing, etc.)  - Assess/evaluate cause of self-care deficits   - Assess range of motion  - Assess patient's mobility; develop plan if impaired  - Assess patient's need for assistive devices and provide as appropriate  - Encourage maximum independence but intervene and supervise when necessary  - Involve family in performance of ADLs  - Assess for home care needs following discharge   - Consider OT consult to assist with ADL evaluation and planning for discharge  - Provide patient education as appropriate  Outcome: Progressing     Problem: DISCHARGE PLANNING  Goal: Discharge to home or other facility with appropriate resources  Description: INTERVENTIONS:  - Identify barriers to discharge w/patient and caregiver  - Arrange for needed discharge resources and transportation as appropriate  - Identify discharge learning needs (meds, wound care, etc.)  - Arrange for interpretive services to assist at discharge as needed  - Refer to Case Management Department for coordinating discharge planning if the patient needs post-hospital services based on physician/advanced practitioner order or complex needs related to functional status, cognitive ability, or social support system  Outcome: Progressing     Problem: Knowledge Deficit  Goal: Patient/family/caregiver demonstrates understanding of disease process, treatment plan, medications, and discharge instructions  Description: Complete learning assessment and assess knowledge base.  Interventions:  - Provide teaching at level of understanding  - Provide teaching via preferred learning methods  Outcome: Progressing     Problem: CARDIOVASCULAR - ADULT  Goal: Maintains optimal cardiac output and hemodynamic  stability  Description: INTERVENTIONS:  - Monitor I/O, vital signs and rhythm  - Monitor for S/S and trends of decreased cardiac output  - Administer and titrate ordered vasoactive medications to optimize hemodynamic stability  - Assess quality of pulses, skin color and temperature  - Assess for signs of decreased coronary artery perfusion  - Instruct patient to report change in severity of symptoms  Outcome: Progressing     Problem: RESPIRATORY - ADULT  Goal: Achieves optimal ventilation and oxygenation  Description: INTERVENTIONS:  - Assess for changes in respiratory status  - Assess for changes in mentation and behavior  - Position to facilitate oxygenation and minimize respiratory effort  - Oxygen administered by appropriate delivery if ordered  - Initiate smoking cessation education as indicated  - Encourage broncho-pulmonary hygiene including cough, deep breathe, Incentive Spirometry  - Assess the need for suctioning and aspirate as needed  - Assess and instruct to report SOB or any respiratory difficulty  - Respiratory Therapy support as indicated  Outcome: Progressing     Problem: GASTROINTESTINAL - ADULT  Goal: Minimal or absence of nausea and/or vomiting  Description: INTERVENTIONS:  - Administer IV fluids if ordered to ensure adequate hydration  - Maintain NPO status until nausea and vomiting are resolved  - Nasogastric tube if ordered  - Administer ordered antiemetic medications as needed  - Provide nonpharmacologic comfort measures as appropriate  - Advance diet as tolerated, if ordered  - Consider nutrition services referral to assist patient with adequate nutrition and appropriate food choices  Outcome: Progressing  Goal: Maintains or returns to baseline bowel function  Description: INTERVENTIONS:  - Assess bowel function  - Encourage oral fluids to ensure adequate hydration  - Administer IV fluids if ordered to ensure adequate hydration  - Administer ordered medications as needed  - Encourage  mobilization and activity  - Consider nutritional services referral to assist patient with adequate nutrition and appropriate food choices  Outcome: Progressing  Goal: Maintains adequate nutritional intake  Description: INTERVENTIONS:  - Monitor percentage of each meal consumed  - Identify factors contributing to decreased intake, treat as appropriate  - Assist with meals as needed  - Monitor I&O, weight, and lab values if indicated  - Obtain nutrition services referral as needed  Outcome: Progressing     Problem: GENITOURINARY - ADULT  Goal: Maintains or returns to baseline urinary function  Description: INTERVENTIONS:  - Assess urinary function  - Encourage oral fluids to ensure adequate hydration if ordered  - Administer IV fluids as ordered to ensure adequate hydration  - Administer ordered medications as needed  - Offer frequent toileting  - Follow urinary retention protocol if ordered  Outcome: Progressing  Goal: Absence of urinary retention  Description: INTERVENTIONS:  - Assess patient’s ability to void and empty bladder  - Monitor I/O  - Bladder scan as needed  - Discuss with physician/AP medications to alleviate retention as needed  - Discuss catheterization for long term situations as appropriate  Outcome: Progressing     Problem: METABOLIC, FLUID AND ELECTROLYTES - ADULT  Goal: Electrolytes maintained within normal limits  Description: INTERVENTIONS:  - Monitor labs and assess patient for signs and symptoms of electrolyte imbalances  - Administer electrolyte replacement as ordered  - Monitor response to electrolyte replacements, including repeat lab results as appropriate  - Instruct patient on fluid and nutrition as appropriate  Outcome: Progressing  Goal: Fluid balance maintained  Description: INTERVENTIONS:  - Monitor labs   - Monitor I/O and WT  - Instruct patient on fluid and nutrition as appropriate  - Assess for signs & symptoms of volume excess or deficit  Outcome: Progressing     Problem:  SKIN/TISSUE INTEGRITY - ADULT  Goal: Incision(s), wounds(s) or drain site(s) healing without S/S of infection  Description: INTERVENTIONS  - Assess and document dressing, incision, wound bed, drain sites and surrounding tissue  - Provide patient and family education  - Perform skin care/dressing changes   Outcome: Progressing     Problem: HEMATOLOGIC - ADULT  Goal: Maintains hematologic stability  Description: INTERVENTIONS  - Assess for signs and symptoms of bleeding or hemorrhage  - Monitor labs  - Administer supportive blood products/factors as ordered and appropriate  Outcome: Progressing     Problem: MUSCULOSKELETAL - ADULT  Goal: Maintain or return mobility to safest level of function  Description: INTERVENTIONS:  - Assess patient's ability to carry out ADLs; assess patient's baseline for ADL function and identify physical deficits which impact ability to perform ADLs (bathing, care of mouth/teeth, toileting, grooming, dressing, etc.)  - Assess/evaluate cause of self-care deficits   - Assess range of motion  - Assess patient's mobility  - Assess patient's need for assistive devices and provide as appropriate  - Encourage maximum independence but intervene and supervise when necessary  - Involve family in performance of ADLs  - Assess for home care needs following discharge   - Consider OT consult to assist with ADL evaluation and planning for discharge  - Provide patient education as appropriate  Outcome: Progressing

## 2024-09-14 NOTE — ASSESSMENT & PLAN NOTE
Dog bite of right hand with swelling and edema  Evaluated by orthopedics who recommends warm soapy baths 3 times daily.  No surgical intervention at this time.  Patient reports typically needing prolonged course of antibiotics  Suspect he will need IV antibiotics through the weekend and transition to Augmentin on Monday if stable  Trend fever, WBC curve, exam

## 2024-09-15 LAB
ANION GAP SERPL CALCULATED.3IONS-SCNC: 7 MMOL/L (ref 4–13)
BUN SERPL-MCNC: 33 MG/DL (ref 5–25)
CALCIUM SERPL-MCNC: 8.7 MG/DL (ref 8.4–10.2)
CHLORIDE SERPL-SCNC: 106 MMOL/L (ref 96–108)
CO2 SERPL-SCNC: 25 MMOL/L (ref 21–32)
CREAT SERPL-MCNC: 1.63 MG/DL (ref 0.6–1.3)
ERYTHROCYTE [DISTWIDTH] IN BLOOD BY AUTOMATED COUNT: 13.6 % (ref 11.6–15.1)
GFR SERPL CREATININE-BSD FRML MDRD: 44 ML/MIN/1.73SQ M
GLUCOSE SERPL-MCNC: 130 MG/DL (ref 65–140)
GLUCOSE SERPL-MCNC: 142 MG/DL (ref 65–140)
GLUCOSE SERPL-MCNC: 160 MG/DL (ref 65–140)
GLUCOSE SERPL-MCNC: 163 MG/DL (ref 65–140)
GLUCOSE SERPL-MCNC: 170 MG/DL (ref 65–140)
HCT VFR BLD AUTO: 33.5 % (ref 36.5–49.3)
HGB BLD-MCNC: 11.2 G/DL (ref 12–17)
MCH RBC QN AUTO: 31.5 PG (ref 26.8–34.3)
MCHC RBC AUTO-ENTMCNC: 33.4 G/DL (ref 31.4–37.4)
MCV RBC AUTO: 94 FL (ref 82–98)
PLATELET # BLD AUTO: 201 THOUSANDS/UL (ref 149–390)
PMV BLD AUTO: 9.5 FL (ref 8.9–12.7)
POTASSIUM SERPL-SCNC: 4 MMOL/L (ref 3.5–5.3)
RBC # BLD AUTO: 3.55 MILLION/UL (ref 3.88–5.62)
SODIUM SERPL-SCNC: 138 MMOL/L (ref 135–147)
WBC # BLD AUTO: 5.15 THOUSAND/UL (ref 4.31–10.16)

## 2024-09-15 PROCEDURE — 99232 SBSQ HOSP IP/OBS MODERATE 35: CPT | Performed by: INTERNAL MEDICINE

## 2024-09-15 PROCEDURE — 82948 REAGENT STRIP/BLOOD GLUCOSE: CPT

## 2024-09-15 PROCEDURE — 85027 COMPLETE CBC AUTOMATED: CPT | Performed by: INTERNAL MEDICINE

## 2024-09-15 PROCEDURE — 10061 I&D ABSCESS COMP/MULTIPLE: CPT | Performed by: ORTHOPAEDIC SURGERY

## 2024-09-15 PROCEDURE — 99232 SBSQ HOSP IP/OBS MODERATE 35: CPT | Performed by: ORTHOPAEDIC SURGERY

## 2024-09-15 PROCEDURE — 87147 CULTURE TYPE IMMUNOLOGIC: CPT | Performed by: PHYSICIAN ASSISTANT

## 2024-09-15 PROCEDURE — 80048 BASIC METABOLIC PNL TOTAL CA: CPT | Performed by: INTERNAL MEDICINE

## 2024-09-15 PROCEDURE — 87205 SMEAR GRAM STAIN: CPT | Performed by: PHYSICIAN ASSISTANT

## 2024-09-15 PROCEDURE — 0H9DXZZ DRAINAGE OF RIGHT LOWER ARM SKIN, EXTERNAL APPROACH: ICD-10-PCS | Performed by: ORTHOPAEDIC SURGERY

## 2024-09-15 PROCEDURE — 87070 CULTURE OTHR SPECIMN AEROBIC: CPT | Performed by: PHYSICIAN ASSISTANT

## 2024-09-15 RX ORDER — LIDOCAINE HYDROCHLORIDE 10 MG/ML
10 INJECTION, SOLUTION EPIDURAL; INFILTRATION; INTRACAUDAL; PERINEURAL ONCE
Status: COMPLETED | OUTPATIENT
Start: 2024-09-15 | End: 2024-09-15

## 2024-09-15 RX ORDER — LIDOCAINE 50 MG/G
1 PATCH TOPICAL DAILY
Status: DISCONTINUED | OUTPATIENT
Start: 2024-09-15 | End: 2024-09-21 | Stop reason: HOSPADM

## 2024-09-15 RX ADMIN — MORPHINE SULFATE 4 MG: 4 INJECTION INTRAVENOUS at 01:54

## 2024-09-15 RX ADMIN — AMPICILLIN SODIUM AND SULBACTAM SODIUM 3 G: 2; 1 INJECTION, POWDER, FOR SOLUTION INTRAMUSCULAR; INTRAVENOUS at 17:25

## 2024-09-15 RX ADMIN — Medication 400 UNITS: at 08:45

## 2024-09-15 RX ADMIN — OXYCODONE HYDROCHLORIDE AND ACETAMINOPHEN 1000 MG: 500 TABLET ORAL at 08:45

## 2024-09-15 RX ADMIN — CARVEDILOL 25 MG: 25 TABLET, FILM COATED ORAL at 17:25

## 2024-09-15 RX ADMIN — AMPICILLIN SODIUM AND SULBACTAM SODIUM 3 G: 2; 1 INJECTION, POWDER, FOR SOLUTION INTRAMUSCULAR; INTRAVENOUS at 06:01

## 2024-09-15 RX ADMIN — ACETAMINOPHEN 975 MG: 325 TABLET ORAL at 06:01

## 2024-09-15 RX ADMIN — MORPHINE SULFATE 4 MG: 4 INJECTION INTRAVENOUS at 12:41

## 2024-09-15 RX ADMIN — CARVEDILOL 25 MG: 25 TABLET, FILM COATED ORAL at 08:45

## 2024-09-15 RX ADMIN — AMPICILLIN SODIUM AND SULBACTAM SODIUM 3 G: 2; 1 INJECTION, POWDER, FOR SOLUTION INTRAMUSCULAR; INTRAVENOUS at 12:34

## 2024-09-15 RX ADMIN — Medication 400 UNITS: at 17:25

## 2024-09-15 RX ADMIN — LIDOCAINE HYDROCHLORIDE 10 ML: 10 INJECTION, SOLUTION EPIDURAL; INFILTRATION; INTRACAUDAL at 09:59

## 2024-09-15 RX ADMIN — LIDOCAINE 5% 1 PATCH: 700 PATCH TOPICAL at 12:42

## 2024-09-15 RX ADMIN — OXYCODONE 5 MG: 5 TABLET ORAL at 08:45

## 2024-09-15 RX ADMIN — OXYCODONE HYDROCHLORIDE AND ACETAMINOPHEN 1000 MG: 500 TABLET ORAL at 21:39

## 2024-09-15 RX ADMIN — OXYCODONE 5 MG: 5 TABLET ORAL at 21:43

## 2024-09-15 NOTE — PLAN OF CARE
Problem: PAIN - ADULT  Goal: Verbalizes/displays adequate comfort level or baseline comfort level  Description: Interventions:  - Encourage patient to monitor pain and request assistance  - Assess pain using appropriate pain scale  - Administer analgesics based on type and severity of pain and evaluate response  - Implement non-pharmacological measures as appropriate and evaluate response  - Consider cultural and social influences on pain and pain management  - Notify physician/advanced practitioner if interventions unsuccessful or patient reports new pain  Outcome: Progressing     Problem: INFECTION - ADULT  Goal: Absence or prevention of progression during hospitalization  Description: INTERVENTIONS:  - Assess and monitor for signs and symptoms of infection  - Monitor lab/diagnostic results  - Monitor all insertion sites, i.e. indwelling lines, tubes, and drains  - Monitor endotracheal if appropriate and nasal secretions for changes in amount and color  - [place] appropriate cooling/warming therapies per order  - Administer medications as ordered  - Instruct and encourage patient and family to use good hand hygiene technique  - Identify and instruct in appropriate isolation precautions for identified infection/condition  Outcome: Progressing     Problem: SAFETY ADULT  Goal: Patient will remain free of falls  Description: INTERVENTIONS:  - Educate patient/family on patient safety including physical limitations  - Instruct patient to call for assistance with activity   - Consult OT/PT to assist with strengthening/mobility   - Keep Call bell within reach  - Keep bed low and locked with side rails adjusted as appropriate  - Keep care items and personal belongings within reach  - Initiate and maintain comfort rounds  - Make Fall Risk Sign visible to staff  - Apply yellow socks and bracelet for high fall risk patients  - Consider moving patient to room near nurses station  Outcome: Progressing  Goal: Maintain or  return to baseline ADL function  Description: INTERVENTIONS:  -  Assess patient's ability to carry out ADLs; assess patient's baseline for ADL function and identify physical deficits which impact ability to perform ADLs (bathing, care of mouth/teeth, toileting, grooming, dressing, etc.)  - Assess/evaluate cause of self-care deficits   - Assess range of motion  - Assess patient's mobility; develop plan if impaired  - Assess patient's need for assistive devices and provide as appropriate  - Encourage maximum independence but intervene and supervise when necessary  - Involve family in performance of ADLs  - Assess for home care needs following discharge   - Consider OT consult to assist with ADL evaluation and planning for discharge  - Provide patient education as appropriate  Outcome: Progressing     Problem: DISCHARGE PLANNING  Goal: Discharge to home or other facility with appropriate resources  Description: INTERVENTIONS:  - Identify barriers to discharge w/patient and caregiver  - Arrange for needed discharge resources and transportation as appropriate  - Identify discharge learning needs (meds, wound care, etc.)  - Arrange for interpretive services to assist at discharge as needed  - Refer to Case Management Department for coordinating discharge planning if the patient needs post-hospital services based on physician/advanced practitioner order or complex needs related to functional status, cognitive ability, or social support system  Outcome: Progressing     Problem: Knowledge Deficit  Goal: Patient/family/caregiver demonstrates understanding of disease process, treatment plan, medications, and discharge instructions  Description: Complete learning assessment and assess knowledge base.  Interventions:  - Provide teaching at level of understanding  - Provide teaching via preferred learning methods  Outcome: Progressing     Problem: CARDIOVASCULAR - ADULT  Goal: Maintains optimal cardiac output and hemodynamic  stability  Description: INTERVENTIONS:  - Monitor I/O, vital signs and rhythm  - Monitor for S/S and trends of decreased cardiac output  - Administer and titrate ordered vasoactive medications to optimize hemodynamic stability  - Assess quality of pulses, skin color and temperature  - Assess for signs of decreased coronary artery perfusion  - Instruct patient to report change in severity of symptoms  Outcome: Progressing     Problem: RESPIRATORY - ADULT  Goal: Achieves optimal ventilation and oxygenation  Description: INTERVENTIONS:  - Assess for changes in respiratory status  - Assess for changes in mentation and behavior  - Position to facilitate oxygenation and minimize respiratory effort  - Oxygen administered by appropriate delivery if ordered  - Initiate smoking cessation education as indicated  - Encourage broncho-pulmonary hygiene including cough, deep breathe, Incentive Spirometry  - Assess the need for suctioning and aspirate as needed  - Assess and instruct to report SOB or any respiratory difficulty  - Respiratory Therapy support as indicated  Outcome: Progressing     Problem: GASTROINTESTINAL - ADULT  Goal: Minimal or absence of nausea and/or vomiting  Description: INTERVENTIONS:  - Administer IV fluids if ordered to ensure adequate hydration  - Maintain NPO status until nausea and vomiting are resolved  - Nasogastric tube if ordered  - Administer ordered antiemetic medications as needed  - Provide nonpharmacologic comfort measures as appropriate  - Advance diet as tolerated, if ordered  - Consider nutrition services referral to assist patient with adequate nutrition and appropriate food choices  Outcome: Progressing  Goal: Maintains or returns to baseline bowel function  Description: INTERVENTIONS:  - Assess bowel function  - Encourage oral fluids to ensure adequate hydration  - Administer IV fluids if ordered to ensure adequate hydration  - Administer ordered medications as needed  - Encourage  mobilization and activity  - Consider nutritional services referral to assist patient with adequate nutrition and appropriate food choices  Outcome: Progressing  Goal: Maintains adequate nutritional intake  Description: INTERVENTIONS:  - Monitor percentage of each meal consumed  - Identify factors contributing to decreased intake, treat as appropriate  - Assist with meals as needed  - Monitor I&O, weight, and lab values if indicated  - Obtain nutrition services referral as needed  Outcome: Progressing     Problem: GENITOURINARY - ADULT  Goal: Maintains or returns to baseline urinary function  Description: INTERVENTIONS:  - Assess urinary function  - Encourage oral fluids to ensure adequate hydration if ordered  - Administer IV fluids as ordered to ensure adequate hydration  - Administer ordered medications as needed  - Offer frequent toileting  - Follow urinary retention protocol if ordered  Outcome: Progressing  Goal: Absence of urinary retention  Description: INTERVENTIONS:  - Assess patient’s ability to void and empty bladder  - Monitor I/O  - Bladder scan as needed  - Discuss with physician/AP medications to alleviate retention as needed  - Discuss catheterization for long term situations as appropriate  Outcome: Progressing     Problem: METABOLIC, FLUID AND ELECTROLYTES - ADULT  Goal: Electrolytes maintained within normal limits  Description: INTERVENTIONS:  - Monitor labs and assess patient for signs and symptoms of electrolyte imbalances  - Administer electrolyte replacement as ordered  - Monitor response to electrolyte replacements, including repeat lab results as appropriate  - Instruct patient on fluid and nutrition as appropriate  Outcome: Progressing  Goal: Fluid balance maintained  Description: INTERVENTIONS:  - Monitor labs   - Monitor I/O and WT  - Instruct patient on fluid and nutrition as appropriate  - Assess for signs & symptoms of volume excess or deficit  Outcome: Progressing     Problem:  SKIN/TISSUE INTEGRITY - ADULT  Goal: Incision(s), wounds(s) or drain site(s) healing without S/S of infection  Description: INTERVENTIONS  - Assess and document dressing, incision, wound bed, drain sites and surrounding tissue  - Provide patient and family education  - Perform skin care/dressing changes   Outcome: Progressing     Problem: HEMATOLOGIC - ADULT  Goal: Maintains hematologic stability  Description: INTERVENTIONS  - Assess for signs and symptoms of bleeding or hemorrhage  - Monitor labs  - Administer supportive blood products/factors as ordered and appropriate  Outcome: Progressing     Problem: MUSCULOSKELETAL - ADULT  Goal: Maintain or return mobility to safest level of function  Description: INTERVENTIONS:  - Assess patient's ability to carry out ADLs; assess patient's baseline for ADL function and identify physical deficits which impact ability to perform ADLs (bathing, care of mouth/teeth, toileting, grooming, dressing, etc.)  - Assess/evaluate cause of self-care deficits   - Assess range of motion  - Assess patient's mobility  - Assess patient's need for assistive devices and provide as appropriate  - Encourage maximum independence but intervene and supervise when necessary  - Involve family in performance of ADLs  - Assess for home care needs following discharge   - Consider OT consult to assist with ADL evaluation and planning for discharge  - Provide patient education as appropriate  Outcome: Progressing

## 2024-09-15 NOTE — ASSESSMENT & PLAN NOTE
CKD stage IIIa at upper end of normal, patient suspect this is due to his n.p.o. status and not having his normal amount of fluid intake  Hold nephrotoxins  Continue IVF

## 2024-09-15 NOTE — ASSESSMENT & PLAN NOTE
after dog bite, volar and dorsal  Erythema and swelling is still present today, mostly concentrated to the dorsal aspect of the hand and wrist and seems more isolated and swollen in the dorsal wrist today.  There is induration here which was not present previously but no obvious fluctuance.  Pain is also slightly increased today but patient is overall aseptic appearing  Low concern for flexor infectious tenosynovitis, necrotizing fasciitis or septic arthritis.  Given the more isolated area of tenderness and swelling and his lack of significant progression with IV antibiotics alone we discussed performing a bedside I&D today to open up his puncture wounds to allow more drainage and potentially deloculated any subcutaneous abscesses.  He is in agreement with this and verbally consented to the proposed procedure.  This was performed at bedside and well-tolerated.  Scant amount of purulence expressed from the dorsal incision.  Culture sent.  Please see procedure note for details.  Recommend to continue IV Abx per primary team at this time. Patient will be re-evaluated in the a.m. for possible OR if no progression overnight after the bedside I&D.    Recommend n.p.o. at midnight for possible OR tomorrow  Continue warm soapy soaks 3 times daily   Continue Strict elevation to the RUE and ice to the area  Pain control PRN  Medical management per primary   Ortho will continue to follow

## 2024-09-15 NOTE — ASSESSMENT & PLAN NOTE
Diagnosed on previous ED  May be contributing to increased creatinine/CKD  Suspect he will miss appointment he's been waiting for on Tuesday of next week   Consult per patient request to expedite treatment plan

## 2024-09-15 NOTE — ASSESSMENT & PLAN NOTE
Dog bite of right hand with swelling and edema  Evaluated by orthopedics who recommends warm soapy baths 3 times daily.  No surgical intervention at this time.  Patient reports typically needing prolonged course of antibiotics  S/p bedside I&D by ortho today   NPO at midnight for potential for OR tomorrow   Appreciate orthopedics recommendations

## 2024-09-15 NOTE — PROGRESS NOTES
Progress Note - Orthopedics   Name: Yoni Csatillo 59 y.o. male I MRN: [id number]  Unit/Bed#: E5 -01 I Date of Admission: 9/11/2024   Date of Service: 9/15/2024 I Hospital Day: 3    Assessment & Plan  Cellulitis of hand, right  after dog bite, volar and dorsal  Erythema and swelling is still present today, mostly concentrated to the dorsal aspect of the hand and wrist and seems more isolated and swollen in the dorsal wrist today.  There is induration here which was not present previously but no obvious fluctuance.  Pain is also slightly increased today but patient is overall aseptic appearing  Low concern for flexor infectious tenosynovitis, necrotizing fasciitis or septic arthritis.  Given the more isolated area of tenderness and swelling and his lack of significant progression with IV antibiotics alone we discussed performing a bedside I&D today to open up his puncture wounds to allow more drainage and potentially deloculated any subcutaneous abscesses.  He is in agreement with this and verbally consented to the proposed procedure.  This was performed at bedside and well-tolerated.  Scant amount of purulence expressed from the dorsal incision.  Culture sent.  Please see procedure note for details.  Recommend to continue IV Abx per primary team at this time. Patient will be re-evaluated in the a.m. for possible OR if no progression overnight after the bedside I&D.    Recommend n.p.o. at midnight for possible OR tomorrow  Continue warm soapy soaks 3 times daily   Continue Strict elevation to the RUE and ice to the area  Pain control PRN  Medical management per primary   Ortho will continue to follow     Dog bite  -See above management  Essential hypertension  -Management per primary team    Type 2 diabetes mellitus with chronic kidney disease, without long-term current use of insulin ([facility])  Lab Results   Component Value Date    HGBA1C 8.0 (H) 06/12/2024       Recent Labs     09/14/24  1112 09/14/24  1556  09/14/24  2142 09/15/24  0736   POCGLU 179* 118 156* 130       Blood Sugar Average: Last 72 hrs:  (P) [id number]    -Management per primary team encourage keeping blood glucose levels less than 180 to help control this infection.  Currently well-managed.  Neuropathy  -Management per primary team  Chronic kidney disease, stage 3 ([facility])  Lab Results   Component Value Date    EGFR 44 09/15/2024    EGFR 37 09/14/2024    EGFR 41 09/13/2024    CREATININE 1.63 (H) 09/15/2024    CREATININE 1.88 (H) 09/14/2024    CREATININE 1.72 (H) 09/13/2024   -Management per primary team        History of Present Illness   59 y.o.male  No acute events overnight but he does feel like his pain has slightly worsened as of this morning.  He feels that is little more swollen and tender right over the back of the wrist.  Also very tender over the front of the wrist near the bite wounds.  He has been doing his warm soapy water soaks about 5 times a day. Denies fevers, chills, CP, SOB, N/V, numbness or tingling. Patient reports no issues with urination or bowel movements.     Objective      Temp:  [97.9 °F (36.6 °C)-98.1 °F (36.7 °C)] 97.9 °F (36.6 °C)  HR:  [69-82] 73  Resp:  [16-17] 17  BP: (135-151)/(80-83) 137/83  O2 Device: None (Room air)          I/O last 24 hours:  In: 1080 [P.O.:1080]  Out: -   Lines/Drains/Airways       Active Status       None                  Physical Exam     Right upper extremity:  -Previous puncture wounds have sealed over  There is more focal swelling over the dorsal wrist measuring approximately 3 x 3 cm.  This feels indurated with no obvious fluctuance.  Cellulitis not tracking proximally and seems isolated to the dorsal hand and wrist.  -Slightly improved motion of his fingers today without severe pain.  Has about a 40 degree arc of motion of the wrist without significant pain but severe pain with attempted further flexion or extension.  Unable to make a composite fist  -Tender over the volar wrist  near the 2 puncture sites.  Mild erythema surrounding the more proximal and radial puncture site but no palpable fluctuance.  -No pain with passive stretch of the fingers, no palmar tenderness  Warm well-perfused fingertips with sensation intact to light touch in the median, ulnar, radial nerve distributions      Lab Results: I have reviewed the following results:   Recent Labs     09/13/24  0608 09/14/24  0456 09/15/24  0602   WBC 6.46 6.46 5.15   HGB 11.5* 11.4* 11.2*   HCT 35.3* 34.1* 33.5*    187 201   BUN 31* 34* 33*   CREATININE 1.72* 1.88* 1.63*     Blood Culture:    Lab Results   Component Value Date    BLOODCX No Growth After 5 Days. 11/28/2023     Wound Culture:   Lab Results   Component Value Date    WOUNDCULT 2+ Growth of 04/26/2024

## 2024-09-15 NOTE — ASSESSMENT & PLAN NOTE
We discussed reasoning behind use of sliding scale insulin for tightly controlled blood sugars to assist with wound healing  .  Patient understands and refuses sliding scale insulin.  Hold Jardiance with elevated creatinine  Continue diabetic diet

## 2024-09-15 NOTE — ASSESSMENT & PLAN NOTE
Lab Results   Component Value Date    HGBA1C 8.0 (H) 06/12/2024       Recent Labs     09/14/24  1112 09/14/24  1556 09/14/24  2142 09/15/24  0736   POCGLU 179* 118 156* 130       Blood Sugar Average: Last 72 hrs:  (P) [id number]    -Management per primary team encourage keeping blood glucose levels less than 180 to help control this infection.  Currently well-managed.

## 2024-09-15 NOTE — ASSESSMENT & PLAN NOTE
Lab Results   Component Value Date    EGFR 44 09/15/2024    EGFR 37 09/14/2024    EGFR 41 09/13/2024    CREATININE 1.63 (H) 09/15/2024    CREATININE 1.88 (H) 09/14/2024    CREATININE 1.72 (H) 09/13/2024   -Management per primary team

## 2024-09-15 NOTE — PROGRESS NOTES
Progress Note - Hospitalist   Name: Yoni Castillo 59 y.o. male I MRN: [id number]  Unit/Bed#: E5 -01 I Date of Admission: 9/11/2024   Date of Service: 9/15/2024 I Hospital Day: 3    Assessment & Plan  Essential hypertension  Continue carvedilol  Hold lisinopril/hydrochlorothiazide with elevated creatinine  Type 2 diabetes mellitus with chronic kidney disease, without long-term current use of insulin ([facility])  We discussed reasoning behind use of sliding scale insulin for tightly controlled blood sugars to assist with wound healing  .  Patient understands and refuses sliding scale insulin.  Hold Jardiance with elevated creatinine  Continue diabetic diet    Neuropathy  Patient currently on several vitamins including vitamin C, garlic capsule, vitamin EE for his peripheral neuropathy.    Chronic kidney disease, stage 3 (HCC)  CKD stage IIIa at upper end of normal, patient suspect this is due to his n.p.o. status and not having his normal amount of fluid intake  Hold nephrotoxins  Continue IVF  Dog bite  Managed as above     Cellulitis of hand, right  Dog bite of right hand with swelling and edema  Evaluated by orthopedics who recommends warm soapy baths 3 times daily.  No surgical intervention at this time.  Patient reports typically needing prolonged course of antibiotics  S/p bedside I&D by ortho today   NPO at midnight for potential for OR tomorrow   Appreciate orthopedics recommendations   Staghorn calculus  Diagnosed on previous ED  May be contributing to increased creatinine/CKD  Suspect he will miss appointment he's been waiting for on Tuesday of next week   Consult per patient request to expedite treatment plan    VTE Pharmacologic Prophylaxis: lovenox     Patient Centered Rounds:  Patient care rounds were performed with nursing    Education and Discussions with Family / Patient: Patient     Time Spent for Care: I have spent a total time of 40 minutes on 09/15/24 in caring for this patient including  Diagnostic results, Patient and family education, Impressions, Documenting in the medical record, Reviewing / ordering tests, medicine, procedures  , Obtaining or reviewing history  , and Communicating with other healthcare professionals .      Current Length of Stay: 3 day(s)    Current Patient Status: Inpatient   Certification Statement: The patient will continue to require additional inpatient hospital stay due to need for IV abx and potential procedures     Discharge Plan: 24-48 hours     Code Status: Level 1 - Full Code      Subjective:   Patient seen and evaluated at bedside. Very pleasant. Happy to have the abscess drained today     Objective:     Vitals:   Temp (24hrs), Av °F (36.7 °C), Min:97.9 °F (36.6 °C), Max:98.1 °F (36.7 °C)    Temp:  [97.9 °F (36.6 °C)-98.1 °F (36.7 °C)] 97.9 °F (36.6 °C)  HR:  [69-82] 73  Resp:  [16-17] 17  BP: (135-151)/(80-83) 137/83  SpO2:  [93 %-95 %] 95 %  Body mass index is 37.37 kg/m².     Input and Output Summary (last 24 hours):       Intake/Output Summary (Last 24 hours) at 9/15/2024 1147  Last data filed at 9/15/2024 0823  Gross per 24 hour   Intake 1080 ml   Output --   Net 1080 ml       Physical Exam:     Physical Exam  Vitals reviewed.   Constitutional:       General: He is not in acute distress.     Appearance: He is well-developed. He is not ill-appearing, toxic-appearing or diaphoretic.   HENT:      Head: Normocephalic and atraumatic.      Mouth/Throat:      Mouth: Mucous membranes are moist.   Eyes:      General: No scleral icterus.     Extraocular Movements: Extraocular movements intact.   Cardiovascular:      Rate and Rhythm: Normal rate and regular rhythm.      Heart sounds: Normal heart sounds.   Pulmonary:      Effort: Pulmonary effort is normal. No respiratory distress.      Breath sounds: Normal breath sounds. No wheezing or rales.   Abdominal:      General: There is no distension.      Palpations: Abdomen is soft.      Tenderness: There is no abdominal  tenderness. There is no guarding or rebound.   Musculoskeletal:         General: No swelling, tenderness or deformity.   Skin:     General: Skin is warm and dry.      Comments: Right upper extremity swollen, clean and dry dressing   Neurological:      General: No focal deficit present.      Mental Status: He is alert. Mental status is at baseline.   Psychiatric:         Mood and Affect: Mood normal.         Behavior: Behavior normal.         Thought Content: Thought content normal.         Judgment: Judgment normal.         Additional Data:     Labs: I have reviewed pertinent results     Results from last 7 days   Lab Units 09/15/24  0602 09/12/24  0851 09/12/24  0615   WBC Thousand/uL 5.15   < > 5.76   HEMOGLOBIN g/dL 11.2*   < > 9.9*   HEMATOCRIT % 33.5*   < > 28.5*   PLATELETS Thousands/uL 201   < > 156   SEGS PCT %  --   --  71   LYMPHO PCT %  --   --  14   MONO PCT %  --   --  13*   EOS PCT %  --   --  2    < > = values in this interval not displayed.     Results from last 7 days   Lab Units 09/15/24  0602 09/13/24  0608 09/12/24  0851   SODIUM mmol/L 138   < > 136   POTASSIUM mmol/L 4.0   < > 4.3   CHLORIDE mmol/L 106   < > 105   CO2 mmol/L 25   < > 26   BUN mg/dL 33*   < > 23   CREATININE mg/dL 1.63*   < > 1.38*   ANION GAP mmol/L 7   < > 5   CALCIUM mg/dL 8.7   < > 8.8   ALBUMIN g/dL  --   --  3.9   TOTAL BILIRUBIN mg/dL  --   --  0.69   ALK PHOS U/L  --   --  33*   ALT U/L  --   --  14   AST U/L  --   --  13   GLUCOSE RANDOM mg/dL 142*   < > 130    < > = values in this interval not displayed.         Results from last 7 days   Lab Units 09/15/24  1106 09/15/24  0736 09/14/24  2142 09/14/24  1556 09/14/24  1112 09/14/24  0740 09/13/24  2044 09/13/24  1603 09/13/24  1057 09/13/24  0749 09/12/24  2056 09/12/24  1602   POC GLUCOSE mg/dl 163* 130 156* 118 179* 143* 160* 154* 180* 134 150* 151*         Results from last 7 days   Lab Units 09/11/24  1742   LACTIC ACID mmol/L 1.0         Imaging: I have reviewed  pertinent imaging       Recent Cultures (last 7 days):           Last 24 Hours Medication List:   Current Facility-Administered Medications   Medication Dose Route Frequency Provider Last Rate    acetaminophen  975 mg Oral Q6H WILTON aBldev Jerez DO      aluminum-magnesium hydroxide-simethicone  30 mL Oral Q4H PRN Sean Morgan DO      ampicillin-sulbactam  3 g Intravenous Q6H Baldev Jerez DO 3 g (09/15/24 0601)    vitamin C  1,000 mg Oral BID Baldev Jerez DO      carvedilol  25 mg Oral BID With Meals Baldev Jerez DO      enoxaparin  40 mg Subcutaneous Daily Baldev Jerez DO      morphine injection  4 mg Intravenous Q6H PRN Vanessa Roger MD      multi-electrolyte  100 mL/hr Intravenous Continuous Sean Morgan  mL/hr (09/14/24 0525)    ondansetron  4 mg Intravenous Q4H PRN Sean Morgan DO      oxyCODONE  5 mg Oral Q4H PRN Vanessa Roger MD      senna  1 tablet Oral BID Sean Morgan DO      vitamin E (tocopherol)  400 Units Oral BID Baldev Jerez DO          Today, Patient Was Seen By: Sean Morgan DO    ** Please Note: Dictation voice to text software may have been used in the creation of this document. **

## 2024-09-15 NOTE — PROCEDURES
Procedure: Right dorsal and volar wrist subcutaneous incision and drainage    Verbal consent obtained from the patient and site marked.  Confirm patient's identity and procedure prior to proceeding.  Patient was then anesthetized with 10 cc of 1% lidocaine without epinephrine administered in the form of a field block at the planned dorsal and volar incisions.    The hand was prepped with Betadine and draped sterilely.      We started dorsally in the dorsal puncture wound centered over the area of more focal swelling and erythema was extended distally and proximally for total 2 cm.  Scissors were then used to spread and deloculated the area of induration.  There was only a scant amount of purulence evacuated.  Wound culture was taken and sent for evaluation.  No further purulence was expressible.  4 cm of quarter inch iodoform packing was placed into the dorsal wound.    We then proceeded volarly in the to volar puncture wounds were extended proximally 1 cm total proximally distally.  We again spread to open up these wounds.  No purulence was encountered.    He was then started in a warm soapy water soak and encourage finger and wrist range of motion.    He was placed in a soft bandage.  We will continue to follow closely.  If he worsens overnight he may require formal operative intervention tomorrow.    Carroll Mccarthy MD

## 2024-09-15 NOTE — PLAN OF CARE
Problem: PAIN - ADULT  Goal: Verbalizes/displays adequate comfort level or baseline comfort level  Description: Interventions:  - Encourage patient to monitor pain and request assistance  - Assess pain using appropriate pain scale  - Administer analgesics based on type and severity of pain and evaluate response  - Implement non-pharmacological measures as appropriate and evaluate response  - Consider cultural and social influences on pain and pain management  - Notify physician/advanced practitioner if interventions unsuccessful or patient reports new pain  Outcome: Progressing     Problem: INFECTION - ADULT  Goal: Absence or prevention of progression during hospitalization  Description: INTERVENTIONS:  - Assess and monitor for signs and symptoms of infection  - Monitor lab/diagnostic results  - Monitor all insertion sites, i.e. indwelling lines, tubes, and drains  - Monitor endotracheal if appropriate and nasal secretions for changes in amount and color  - [place] appropriate cooling/warming therapies per order  - Administer medications as ordered  - Instruct and encourage patient and family to use good hand hygiene technique  - Identify and instruct in appropriate isolation precautions for identified infection/condition  Outcome: Progressing     Problem: SAFETY ADULT  Goal: Patient will remain free of falls  Description: INTERVENTIONS:  - Educate patient/family on patient safety including physical limitations  - Instruct patient to call for assistance with activity   - Consult OT/PT to assist with strengthening/mobility   - Keep Call bell within reach  - Keep bed low and locked with side rails adjusted as appropriate  - Keep care items and personal belongings within reach  - Initiate and maintain comfort rounds  - Make Fall Risk Sign visible to staff  - Apply yellow socks and bracelet for high fall risk patients  - Consider moving patient to room near nurses station  Outcome: Progressing  Goal: Maintain or  return to baseline ADL function  Description: INTERVENTIONS:  -  Assess patient's ability to carry out ADLs; assess patient's baseline for ADL function and identify physical deficits which impact ability to perform ADLs (bathing, care of mouth/teeth, toileting, grooming, dressing, etc.)  - Assess/evaluate cause of self-care deficits   - Assess range of motion  - Assess patient's mobility; develop plan if impaired  - Assess patient's need for assistive devices and provide as appropriate  - Encourage maximum independence but intervene and supervise when necessary  - Involve family in performance of ADLs  - Assess for home care needs following discharge   - Consider OT consult to assist with ADL evaluation and planning for discharge  - Provide patient education as appropriate  Outcome: Progressing     Problem: DISCHARGE PLANNING  Goal: Discharge to home or other facility with appropriate resources  Description: INTERVENTIONS:  - Identify barriers to discharge w/patient and caregiver  - Arrange for needed discharge resources and transportation as appropriate  - Identify discharge learning needs (meds, wound care, etc.)  - Arrange for interpretive services to assist at discharge as needed  - Refer to Case Management Department for coordinating discharge planning if the patient needs post-hospital services based on physician/advanced practitioner order or complex needs related to functional status, cognitive ability, or social support system  Outcome: Progressing     Problem: Knowledge Deficit  Goal: Patient/family/caregiver demonstrates understanding of disease process, treatment plan, medications, and discharge instructions  Description: Complete learning assessment and assess knowledge base.  Interventions:  - Provide teaching at level of understanding  - Provide teaching via preferred learning methods  Outcome: Progressing     Problem: CARDIOVASCULAR - ADULT  Goal: Maintains optimal cardiac output and hemodynamic  stability  Description: INTERVENTIONS:  - Monitor I/O, vital signs and rhythm  - Monitor for S/S and trends of decreased cardiac output  - Administer and titrate ordered vasoactive medications to optimize hemodynamic stability  - Assess quality of pulses, skin color and temperature  - Assess for signs of decreased coronary artery perfusion  - Instruct patient to report change in severity of symptoms  Outcome: Progressing     Problem: RESPIRATORY - ADULT  Goal: Achieves optimal ventilation and oxygenation  Description: INTERVENTIONS:  - Assess for changes in respiratory status  - Assess for changes in mentation and behavior  - Position to facilitate oxygenation and minimize respiratory effort  - Oxygen administered by appropriate delivery if ordered  - Initiate smoking cessation education as indicated  - Encourage broncho-pulmonary hygiene including cough, deep breathe, Incentive Spirometry  - Assess the need for suctioning and aspirate as needed  - Assess and instruct to report SOB or any respiratory difficulty  - Respiratory Therapy support as indicated  Outcome: Progressing     Problem: GASTROINTESTINAL - ADULT  Goal: Minimal or absence of nausea and/or vomiting  Description: INTERVENTIONS:  - Administer IV fluids if ordered to ensure adequate hydration  - Maintain NPO status until nausea and vomiting are resolved  - Nasogastric tube if ordered  - Administer ordered antiemetic medications as needed  - Provide nonpharmacologic comfort measures as appropriate  - Advance diet as tolerated, if ordered  - Consider nutrition services referral to assist patient with adequate nutrition and appropriate food choices  Outcome: Progressing  Goal: Maintains or returns to baseline bowel function  Description: INTERVENTIONS:  - Assess bowel function  - Encourage oral fluids to ensure adequate hydration  - Administer IV fluids if ordered to ensure adequate hydration  - Administer ordered medications as needed  - Encourage  mobilization and activity  - Consider nutritional services referral to assist patient with adequate nutrition and appropriate food choices  Outcome: Progressing  Goal: Maintains adequate nutritional intake  Description: INTERVENTIONS:  - Monitor percentage of each meal consumed  - Identify factors contributing to decreased intake, treat as appropriate  - Assist with meals as needed  - Monitor I&O, weight, and lab values if indicated  - Obtain nutrition services referral as needed  Outcome: Progressing     Problem: GENITOURINARY - ADULT  Goal: Maintains or returns to baseline urinary function  Description: INTERVENTIONS:  - Assess urinary function  - Encourage oral fluids to ensure adequate hydration if ordered  - Administer IV fluids as ordered to ensure adequate hydration  - Administer ordered medications as needed  - Offer frequent toileting  - Follow urinary retention protocol if ordered  Outcome: Progressing  Goal: Absence of urinary retention  Description: INTERVENTIONS:  - Assess patient’s ability to void and empty bladder  - Monitor I/O  - Bladder scan as needed  - Discuss with physician/AP medications to alleviate retention as needed  - Discuss catheterization for long term situations as appropriate  Outcome: Progressing     Problem: METABOLIC, FLUID AND ELECTROLYTES - ADULT  Goal: Electrolytes maintained within normal limits  Description: INTERVENTIONS:  - Monitor labs and assess patient for signs and symptoms of electrolyte imbalances  - Administer electrolyte replacement as ordered  - Monitor response to electrolyte replacements, including repeat lab results as appropriate  - Instruct patient on fluid and nutrition as appropriate  Outcome: Progressing  Goal: Fluid balance maintained  Description: INTERVENTIONS:  - Monitor labs   - Monitor I/O and WT  - Instruct patient on fluid and nutrition as appropriate  - Assess for signs & symptoms of volume excess or deficit  Outcome: Progressing     Problem:  SKIN/TISSUE INTEGRITY - ADULT  Goal: Incision(s), wounds(s) or drain site(s) healing without S/S of infection  Description: INTERVENTIONS  - Assess and document dressing, incision, wound bed, drain sites and surrounding tissue  - Provide patient and family education  - Perform skin care/dressing changes   Outcome: Progressing     Problem: HEMATOLOGIC - ADULT  Goal: Maintains hematologic stability  Description: INTERVENTIONS  - Assess for signs and symptoms of bleeding or hemorrhage  - Monitor labs  - Administer supportive blood products/factors as ordered and appropriate  Outcome: Progressing     Problem: MUSCULOSKELETAL - ADULT  Goal: Maintain or return mobility to safest level of function  Description: INTERVENTIONS:  - Assess patient's ability to carry out ADLs; assess patient's baseline for ADL function and identify physical deficits which impact ability to perform ADLs (bathing, care of mouth/teeth, toileting, grooming, dressing, etc.)  - Assess/evaluate cause of self-care deficits   - Assess range of motion  - Assess patient's mobility  - Assess patient's need for assistive devices and provide as appropriate  - Encourage maximum independence but intervene and supervise when necessary  - Involve family in performance of ADLs  - Assess for home care needs following discharge   - Consider OT consult to assist with ADL evaluation and planning for discharge  - Provide patient education as appropriate  Outcome: Progressing

## 2024-09-16 ENCOUNTER — TELEPHONE (OUTPATIENT)
Age: 59
End: 2024-09-16

## 2024-09-16 LAB
ANION GAP SERPL CALCULATED.3IONS-SCNC: 8 MMOL/L (ref 4–13)
BUN SERPL-MCNC: 30 MG/DL (ref 5–25)
CALCIUM SERPL-MCNC: 8.3 MG/DL (ref 8.4–10.2)
CHLORIDE SERPL-SCNC: 104 MMOL/L (ref 96–108)
CO2 SERPL-SCNC: 22 MMOL/L (ref 21–32)
CREAT SERPL-MCNC: 1.38 MG/DL (ref 0.6–1.3)
ERYTHROCYTE [DISTWIDTH] IN BLOOD BY AUTOMATED COUNT: 13.5 % (ref 11.6–15.1)
GFR SERPL CREATININE-BSD FRML MDRD: 54 ML/MIN/1.73SQ M
GLUCOSE SERPL-MCNC: 106 MG/DL (ref 65–140)
GLUCOSE SERPL-MCNC: 156 MG/DL (ref 65–140)
GLUCOSE SERPL-MCNC: 158 MG/DL (ref 65–140)
GLUCOSE SERPL-MCNC: 167 MG/DL (ref 65–140)
GLUCOSE SERPL-MCNC: 195 MG/DL (ref 65–140)
HCT VFR BLD AUTO: 31.9 % (ref 36.5–49.3)
HGB BLD-MCNC: 10.8 G/DL (ref 12–17)
MCH RBC QN AUTO: 32.4 PG (ref 26.8–34.3)
MCHC RBC AUTO-ENTMCNC: 33.9 G/DL (ref 31.4–37.4)
MCV RBC AUTO: 96 FL (ref 82–98)
PLATELET # BLD AUTO: 195 THOUSANDS/UL (ref 149–390)
PMV BLD AUTO: 9.5 FL (ref 8.9–12.7)
POTASSIUM SERPL-SCNC: 4 MMOL/L (ref 3.5–5.3)
RBC # BLD AUTO: 3.33 MILLION/UL (ref 3.88–5.62)
SODIUM SERPL-SCNC: 134 MMOL/L (ref 135–147)
WBC # BLD AUTO: 4.91 THOUSAND/UL (ref 4.31–10.16)

## 2024-09-16 PROCEDURE — 99232 SBSQ HOSP IP/OBS MODERATE 35: CPT | Performed by: STUDENT IN AN ORGANIZED HEALTH CARE EDUCATION/TRAINING PROGRAM

## 2024-09-16 PROCEDURE — 80048 BASIC METABOLIC PNL TOTAL CA: CPT | Performed by: INTERNAL MEDICINE

## 2024-09-16 PROCEDURE — 82948 REAGENT STRIP/BLOOD GLUCOSE: CPT

## 2024-09-16 PROCEDURE — 99024 POSTOP FOLLOW-UP VISIT: CPT | Performed by: ORTHOPAEDIC SURGERY

## 2024-09-16 PROCEDURE — 99222 1ST HOSP IP/OBS MODERATE 55: CPT | Performed by: UROLOGY

## 2024-09-16 PROCEDURE — 85027 COMPLETE CBC AUTOMATED: CPT | Performed by: INTERNAL MEDICINE

## 2024-09-16 RX ADMIN — CARVEDILOL 25 MG: 25 TABLET, FILM COATED ORAL at 17:16

## 2024-09-16 RX ADMIN — OXYCODONE 5 MG: 5 TABLET ORAL at 22:27

## 2024-09-16 RX ADMIN — Medication 400 UNITS: at 17:16

## 2024-09-16 RX ADMIN — LIDOCAINE 5% 1 PATCH: 700 PATCH TOPICAL at 08:28

## 2024-09-16 RX ADMIN — ACETAMINOPHEN 975 MG: 325 TABLET ORAL at 17:16

## 2024-09-16 RX ADMIN — AMPICILLIN SODIUM AND SULBACTAM SODIUM 3 G: 2; 1 INJECTION, POWDER, FOR SOLUTION INTRAMUSCULAR; INTRAVENOUS at 00:09

## 2024-09-16 RX ADMIN — OXYCODONE HYDROCHLORIDE AND ACETAMINOPHEN 1000 MG: 500 TABLET ORAL at 21:27

## 2024-09-16 RX ADMIN — AMPICILLIN SODIUM AND SULBACTAM SODIUM 3 G: 2; 1 INJECTION, POWDER, FOR SOLUTION INTRAMUSCULAR; INTRAVENOUS at 17:54

## 2024-09-16 RX ADMIN — SODIUM CHLORIDE, SODIUM GLUCONATE, SODIUM ACETATE, POTASSIUM CHLORIDE, MAGNESIUM CHLORIDE, SODIUM PHOSPHATE, DIBASIC, AND POTASSIUM PHOSPHATE 100 ML/HR: .53; .5; .37; .037; .03; .012; .00082 INJECTION, SOLUTION INTRAVENOUS at 06:23

## 2024-09-16 RX ADMIN — OXYCODONE 5 MG: 5 TABLET ORAL at 13:09

## 2024-09-16 RX ADMIN — ACETAMINOPHEN 975 MG: 325 TABLET ORAL at 00:16

## 2024-09-16 RX ADMIN — MORPHINE SULFATE 4 MG: 4 INJECTION INTRAVENOUS at 00:15

## 2024-09-16 RX ADMIN — OXYCODONE 5 MG: 5 TABLET ORAL at 08:28

## 2024-09-16 RX ADMIN — SODIUM CHLORIDE, SODIUM GLUCONATE, SODIUM ACETATE, POTASSIUM CHLORIDE, MAGNESIUM CHLORIDE, SODIUM PHOSPHATE, DIBASIC, AND POTASSIUM PHOSPHATE 100 ML/HR: .53; .5; .37; .037; .03; .012; .00082 INJECTION, SOLUTION INTRAVENOUS at 17:16

## 2024-09-16 RX ADMIN — AMPICILLIN SODIUM AND SULBACTAM SODIUM 3 G: 2; 1 INJECTION, POWDER, FOR SOLUTION INTRAMUSCULAR; INTRAVENOUS at 05:29

## 2024-09-16 RX ADMIN — Medication 400 UNITS: at 08:28

## 2024-09-16 RX ADMIN — AMPICILLIN SODIUM AND SULBACTAM SODIUM 3 G: 2; 1 INJECTION, POWDER, FOR SOLUTION INTRAMUSCULAR; INTRAVENOUS at 13:03

## 2024-09-16 RX ADMIN — OXYCODONE HYDROCHLORIDE AND ACETAMINOPHEN 1000 MG: 500 TABLET ORAL at 08:28

## 2024-09-16 RX ADMIN — ACETAMINOPHEN 975 MG: 325 TABLET ORAL at 13:03

## 2024-09-16 RX ADMIN — ACETAMINOPHEN 975 MG: 325 TABLET ORAL at 06:27

## 2024-09-16 RX ADMIN — ACETAMINOPHEN 975 MG: 325 TABLET ORAL at 23:56

## 2024-09-16 RX ADMIN — CARVEDILOL 25 MG: 25 TABLET, FILM COATED ORAL at 08:28

## 2024-09-16 RX ADMIN — MORPHINE SULFATE 4 MG: 4 INJECTION INTRAVENOUS at 17:23

## 2024-09-16 RX ADMIN — AMPICILLIN SODIUM AND SULBACTAM SODIUM 3 G: 2; 1 INJECTION, POWDER, FOR SOLUTION INTRAMUSCULAR; INTRAVENOUS at 23:57

## 2024-09-16 NOTE — TELEPHONE ENCOUNTER
Caller: Patient    Doctor/Office: podiatry    Call regarding :  rescheduling appt    Call was transferred to: podiatry

## 2024-09-16 NOTE — ASSESSMENT & PLAN NOTE
Continue carvedilol  Hold lisinopril/hydrochlorothiazide with elevated creatinine  BP currently controlled

## 2024-09-16 NOTE — ASSESSMENT & PLAN NOTE
after dog bite, volar and dorsal  Erythema and swelling still more isolated and swollen in the dorsal wrist today but less swollen than yesterday.  Slightly less tender and improved finger and wrist ROM compared to yesterday. No obvious fluctuance.  aseptic appearing  Low concern for flexor infectious tenosynovitis, necrotizing fasciitis or septic arthritis.  Given some improvement after bedside I&D there is no indication for operative intervention today.   Will exam progress again tomorrow and if he worsens he may require operative debridement tomorrow.   Recommend to continue IV Abx per primary team at this time. Patient will be re-evaluated in the a.m. for possible OR  Recommend n.p.o. at midnight for possible OR tomorrow  Continue warm soapy soaks 3 times daily   Continue Strict elevation to the RUE and ice to the area  Pain control PRN  Medical management per primary   Ortho will continue to follow

## 2024-09-16 NOTE — ASSESSMENT & PLAN NOTE
Lab Results   Component Value Date    HGBA1C 8.0 (H) 06/12/2024       Recent Labs     09/15/24  0736 09/15/24  1106 09/15/24  1554 09/15/24  2100   POCGLU 130 163* 160* 170*       Blood Sugar Average: Last 72 hrs:  (P) [id number]    -Management per primary team encourage keeping blood glucose levels less than 180 to help control this infection.  Currently well-managed.

## 2024-09-16 NOTE — ASSESSMENT & PLAN NOTE
Dog bite of right hand with swelling and edema  Evaluated by orthopedics who recommends warm soapy baths 3 times daily.  No surgical intervention at this time.  Patient reports typically needing prolonged course of antibiotics  S/p bedside I&D by ortho 09/15  Reevaluated today, ortho recommend NPO at midnight for potential for OR tomorrow   Appreciate orthopedics recommendations

## 2024-09-16 NOTE — ASSESSMENT & PLAN NOTE
Patient has a 2.8cm right staghorn calculus amongst several other non-obstructing right renal calculi seen on CT on 3/6/24.   Creatinine 9/16/24= 1.38 which appears to be at baseline for patient   History of CKD stage 3  Negative for leukocytosis   No urgent indication for surgical intervention during this hospital admission.  We briefly discussed treatment options including ureteroscopy with lithotripsy and PCNL.  Outpatient urology appointment scheduled for 10/10/24 to discuss treatment plan.

## 2024-09-16 NOTE — ASSESSMENT & PLAN NOTE
Lab Results   Component Value Date    EGFR 54 09/16/2024    EGFR 44 09/15/2024    EGFR 37 09/14/2024    CREATININE 1.38 (H) 09/16/2024    CREATININE 1.63 (H) 09/15/2024    CREATININE 1.88 (H) 09/14/2024   -Management per primary team

## 2024-09-16 NOTE — PROGRESS NOTES
Progress Note - Hospitalist   Name: Yoni Castillo 59 y.o. male I MRN: [id number]  Unit/Bed#: E5 -01 I Date of Admission: 2024   Date of Service: 2024 I Hospital Day: 4     Assessment & Plan  Essential hypertension  Continue carvedilol  Hold lisinopril/hydrochlorothiazide with elevated creatinine  BP currently controlled  Type 2 diabetes mellitus with chronic kidney disease, without long-term current use of insulin (HCC)  Refuses sliding scale insulin  Hold Jardiance with elevated creatinine  Continue diabetic diet    Neuropathy  Patient currently on several vitamins including vitamin C, garlic capsule, vitamin EE for his peripheral neuropathy.    Chronic kidney disease, stage 3 (HCC)  IV fluids while NPO midnight  Dog bite  Managed as above     Cellulitis of hand, right  Dog bite of right hand with swelling and edema  Evaluated by orthopedics who recommends warm soapy baths 3 times daily.  No surgical intervention at this time.  Patient reports typically needing prolonged course of antibiotics  S/p bedside I&D by ortho 09/15  Reevaluated today, ortho recommend NPO at midnight for potential for OR tomorrow   Appreciate orthopedics recommendations   Staghorn calculus  Diagnosed on previous ED  Urology consulted, recommend outpatient follow up on 10/10 to further discuss    VTE Pharmacologic Prophylaxis:   Pharmacologic: Enoxaparin (Lovenox)  Mechanical VTE Prophylaxis in Place: Yes    Current Length of Stay: 4 day(s)    Current Patient Status: Inpatient   Certification Statement: The patient will continue to require additional inpatient hospital stay due to IV abx, ortho evaluation    Discharge Plan: 24 hours    Code Status: Level 1 - Full Code      Subjective:   No events overnight. Pain controlled. Tolerating diet. Denies fevers, chills.    Objective:     Vitals:   Temp (24hrs), Av.9 °F (36.6 °C), Min:97.9 °F (36.6 °C), Max:97.9 °F (36.6 °C)    Temp:  [97.9 °F (36.6 °C)] 97.9 °F (36.6 °C)  HR:   [71-79] 74  Resp:  [16] 16  BP: (111-146)/(68-94) 111/68  SpO2:  [93 %-97 %] 96 %  Body mass index is 37.37 kg/m².     Input and Output Summary (last 24 hours):       Intake/Output Summary (Last 24 hours) at 9/16/2024 1302  Last data filed at 9/16/2024 0623  Gross per 24 hour   Intake 960 ml   Output --   Net 960 ml       Physical Exam:     Physical Exam  Vitals and nursing note reviewed.   Constitutional:       Appearance: Normal appearance. He is obese.   HENT:      Head: Normocephalic and atraumatic.   Eyes:      Conjunctiva/sclera: Conjunctivae normal.   Cardiovascular:      Rate and Rhythm: Normal rate.   Pulmonary:      Effort: Pulmonary effort is normal.      Breath sounds: No wheezing.   Abdominal:      General: Bowel sounds are normal. There is no distension.      Palpations: Abdomen is soft.   Musculoskeletal:         General: No swelling. Normal range of motion.   Skin:     General: Skin is warm and dry.      Comments: Right hand swelling, wrapped in dressing   Neurological:      General: No focal deficit present.      Mental Status: He is alert. Mental status is at baseline.         Additional Data:     Labs:    Results from last 7 days   Lab Units 09/16/24  0500 09/12/24  0851 09/12/24  0615   WBC Thousand/uL 4.91   < > 5.76   HEMOGLOBIN g/dL 10.8*   < > 9.9*   HEMATOCRIT % 31.9*   < > 28.5*   PLATELETS Thousands/uL 195   < > 156   SEGS PCT %  --   --  71   LYMPHO PCT %  --   --  14   MONO PCT %  --   --  13*   EOS PCT %  --   --  2    < > = values in this interval not displayed.     Results from last 7 days   Lab Units 09/16/24  0500 09/13/24  0608 09/12/24  0851   SODIUM mmol/L 134*   < > 136   POTASSIUM mmol/L 4.0   < > 4.3   CHLORIDE mmol/L 104   < > 105   CO2 mmol/L 22   < > 26   BUN mg/dL 30*   < > 23   CREATININE mg/dL 1.38*   < > 1.38*   ANION GAP mmol/L 8   < > 5   CALCIUM mg/dL 8.3*   < > 8.8   ALBUMIN g/dL  --   --  3.9   TOTAL BILIRUBIN mg/dL  --   --  0.69   ALK PHOS U/L  --   --  33*   ALT  U/L  --   --  14   AST U/L  --   --  13   GLUCOSE RANDOM mg/dL 106   < > 130    < > = values in this interval not displayed.         Results from last 7 days   Lab Units 09/16/24  1105 09/16/24  0724 09/15/24  2100 09/15/24  1554 09/15/24  1106 09/15/24  0736 09/14/24  2142 09/14/24  1556 09/14/24  1112 09/14/24  0740 09/13/24  2044 09/13/24  1603   POC GLUCOSE mg/dl 167* 156* 170* 160* 163* 130 156* 118 179* 143* 160* 154*         Results from last 7 days   Lab Units 09/11/24  1742   LACTIC ACID mmol/L 1.0           * I Have Reviewed All Lab Data Listed Above.  * Additional Pertinent Lab Tests Reviewed: All Labs For Current Hospital Admission Reviewed    Mobility:  Basic Mobility Inpatient Raw Score: 24  -[facility] Goal: 8: Walk 250 feet or more  -[facility] Achieved: 3: Sit at edge of bed    Lines:     Invasive Devices       Peripheral Intravenous Line  Duration             Peripheral IV 09/15/24 Left;Ventral (anterior) Forearm 1 day                       Imaging:    Imaging Reports Reviewed Today Include:     XR hand 3+ views RIGHT    Result Date: 9/11/2024  Impression: No acute osseous abnormality. Computerized Assisted Algorithm (CAA) may have been used to analyze all applicable images. Workstation performed: [id number]     CT upper extremity w contrast right    Result Date: 9/11/2024  Impression: Mild swelling of the volar wrist in keeping with provided history of dog bite. No fluid collection. No soft tissue gas. Workstation performed: [id number]        Recent Cultures (last 7 days):     Results from last 7 days   Lab Units 09/15/24  1117   GRAM STAIN RESULT  No Polys or Bacteria seen       Last 24 Hours Medication List:   Current Facility-Administered Medications   Medication Dose Route Frequency Provider Last Rate    acetaminophen  975 mg Oral Q6H WILTON Jerez,       aluminum-magnesium hydroxide-simethicone  30 mL Oral Q4H PRN Sean Morgan DO      ampicillin-sulbactam  3 g Intravenous Q6H  Baldev Jerez DO 3 g (09/16/24 0529)    vitamin C  1,000 mg Oral BID Baldev Jerez DO      carvedilol  25 mg Oral BID With Meals Baldev Jerez DO      enoxaparin  40 mg Subcutaneous Daily Baldev Jerez DO      lidocaine  1 patch Topical Daily Sean Morgan DO      morphine injection  4 mg Intravenous Q6H PRN Vanessa Roger MD      multi-electrolyte  100 mL/hr Intravenous Continuous Sean Morgan  mL/hr (09/16/24 0623)    ondansetron  4 mg Intravenous Q4H PRN Sean Morgan DO      oxyCODONE  5 mg Oral Q4H PRN Vanessa Roger MD      senna  1 tablet Oral BID Sean Morgan DO      vitamin E (tocopherol)  400 Units Oral BID Baldev Jerez DO          Today, Patient Was Seen By: Ham Sheehan MD    ** Please Note: Dictation voice to text software may have been used in the creation of this document. **

## 2024-09-16 NOTE — CONSULTS
Consult - Urology   Tarunmarcelino [facility] 1965, 59 y.o. male MRN: [id number]    Unit/Bed#: E5 -01 Encounter: [id number]      Staghorn calculus  Assessment & Plan  Patient has a 2.8cm right staghorn calculus amongst several other non-obstructing right renal calculi seen on CT on 3/6/24.   Creatinine 9/16/24= 1.38 which appears to be at baseline for patient   History of CKD stage 3  Negative for leukocytosis   No urgent indication for surgical intervention during this hospital admission.  We briefly discussed treatment options including ureteroscopy with lithotripsy and PCNL.  Outpatient urology appointment scheduled for 10/10/24 to discuss treatment plan.         Urology will sign off but remain available for any further inpatient needs. Please feel free to contact the provider currently covering the Urology Epic Chat role for this campus with questions or concerns.      Subjective:   Patient presented to ED on 9/11/24 with cellulitis of his right hand d/t a dog bite. He had a CT renal stone study performed at a previous ED visit on 3/6/24 d/t left flank pain which revealed multiple non-obstructing right renal calculi, including a 2.8cm staghorn calculus without hydroureteronephrosis. Patient reports mild right flank discomfort, no nausea, vomiting, fevers, or chills.       Review of Systems   Constitutional:  Negative for chills, diaphoresis, fatigue and fever.   Respiratory:  Negative for cough and shortness of breath.    Gastrointestinal:  Negative for abdominal pain, diarrhea, nausea and vomiting.   Genitourinary:  Positive for flank pain. Negative for decreased urine volume, difficulty urinating, dysuria, frequency, hematuria and urgency.   Musculoskeletal:  Negative for back pain and myalgias.   Skin:  Negative for pallor and wound.   Neurological:  Negative for dizziness, weakness, light-headedness and numbness.       Objective:  Vitals: Blood pressure 111/68, pulse 74, temperature 97.9 °F (36.6 °C),  temperature source Oral, resp. rate 16, height 6' (1.829 m), weight 125 kg (275 lb 9.2 oz), SpO2 96%.,Body mass index is 37.37 kg/m².    Physical Exam  Constitutional:       Appearance: Normal appearance. He is obese.   HENT:      Head: Normocephalic and atraumatic.   Eyes:      Conjunctiva/sclera: Conjunctivae normal.   Pulmonary:      Effort: Pulmonary effort is normal.   Abdominal:      Tenderness: There is no right CVA tenderness or left CVA tenderness.   Musculoskeletal:         General: Normal range of motion.      Cervical back: Normal range of motion.   Skin:     General: Skin is warm and dry.   Neurological:      General: No focal deficit present.      Mental Status: He is alert and oriented to person, place, and time.   Psychiatric:         Mood and Affect: Mood normal.         Behavior: Behavior normal.         Thought Content: Thought content normal.         Judgment: Judgment normal.         Imaging:    3/6/2024:   CT ABDOMEN AND PELVIS WITHOUT IV CONTRAST - LOW DOSE RENAL STONE     INDICATION: Left flank pain.     COMPARISON: CT abdomen pelvis 3/1/2024     TECHNIQUE: Low radiation dose thin section CT examination of the abdomen and pelvis was performed without intravenous or oral contrast according to a protocol specifically designed to evaluate for urinary tract calculus. Axial, sagittal, and coronal 2D   reformatted images were created from the source data and submitted for interpretation. Evaluation for pathology in the abdomen and pelvis that is unrelated to urinary tract calculi is limited.     Radiation dose length product (DLP) for this visit: 869.18 mGy-cm . This examination, like all CT scans performed in the [facility], was performed utilizing techniques to minimize radiation dose exposure, including the use of iterative   reconstruction and automated exposure control.     URINARY TRACT FINDINGS:     RIGHT KIDNEY AND URETER: Stable 2.8 cm right renal collecting system  staghorn calculus and additional smaller nonobstructing calculi, the largest measuring 1.4 cm in the right renal lower pole. No hydroureteronephrosis.     LEFT KIDNEY AND URETER: No urinary tract calculi. No hydronephrosis or hydroureter. Subcentimeter low-attenuation left renal lesion, too small to characterize.     URINARY BLADDER: Unremarkable.        ADDITIONAL FINDINGS:     LOWER CHEST: Bibasilar atelectasis. Coronary artery atherosclerosis at the heart base.     SOLID VISCERA: Limited low radiation dose noncontrast CT evaluation demonstrates no clinically significant abnormality of the imaged portions of the liver, spleen, pancreas, or adrenal glands.     GALLBLADDER/BILIARY TREE: Sludge in the gallbladder without findings of acute cholecystitis. No biliary dilation.     STOMACH AND BOWEL: Colonic diverticulosis without findings of acute diverticulitis. 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     APPENDIX: Mildly dilated appendix measuring up to 0.8 cm without surrounding inflammatory fat stranding, stable since 3/1/2024.     ABDOMINOPELVIC CAVITY: No ascites. No pneumoperitoneum. No lymphadenopathy.     VESSELS: Atherosclerosis without abdominal aortic aneurysm.     REPRODUCTIVE ORGANS: Unremarkable for patient's age.     ABDOMINAL WALL/INGUINAL REGIONS: Unremarkable.     BONES: No acute fracture or suspicious osseous lesion. A 0.6 cm sclerotic lesion in the right acetabulum, previously measuring 3 mm on 6/18/2012 and representing a bone island. Additional bone island in the left lateral left sixth rib.     IMPRESSION:     Stable multiple nonobstructing right renal calculi, including the 2.8 cm staghorn calculus, without hydroureteronephrosis. No left renal calculi or hydronephrosis.     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  Imaging reviewed - both report and images personally reviewed.     Labs:  Recent Labs     09/14/24  0456 09/15/24  0602 09/16/24  0500   WBC 6.46 5.15 4.91     Recent Labs     09/14/24  0456 09/15/24  0602 09/16/24  0500   HGB 11.4* 11.2* 10.8*       Recent Labs     09/14/24  0456 09/15/24  0602 09/16/24  0500   CREATININE 1.88* 1.63* 1.38*       History:  Social History     Socioeconomic History    Marital status: Registered Domestic Partner     Spouse name: None    Number of children: None    Years of education: None    Highest education level: None   Occupational History    None   Tobacco Use    Smoking status: Never    Smokeless tobacco: Never   Vaping Use    Vaping status: Never Used   Substance and Sexual Activity    Alcohol use: Yes     Alcohol/week: 1.0 standard drink of alcohol     Types: 1 Cans of beer per week     Comment: ocassional    Drug use: Never    Sexual activity: Not Currently     Partners: Female     Birth control/protection: Abstinence   Other Topics Concern    None   Social History Narrative    None     Social Determinants of Health     Financial Resource Strain: Not on file   Food Insecurity: No Food Insecurity (9/12/2024)    Hunger Vital Sign     Worried About Running Out of Food in the Last Year: Never true     Ran Out of Food in the Last Year: Never true   Transportation Needs: No Transportation Needs (9/12/2024)    PRAPARE - Transportation     Lack of Transportation (Medical): No     Lack of Transportation (Non-Medical): No   Physical Activity: Not on file   Stress: Not on file   Social Connections: Not on file   Intimate Partner Violence: Not on file   Housing Stability: Low Risk  (9/12/2024)    Housing Stability Vital Sign     Unable to Pay for Housing in the Last Year: No     Number of Times Moved in the Last Year: 0     Homeless in the Last Year: No       Past Medical History:   Diagnosis Date    Chronic kidney disease 2012    Chronic pain disorder     Diabetes  mellitus (HCC)     H/O eye surgery     High blood sugar     Hypertension     Kidney stone     Liver disease      Past Surgical History:   Procedure Laterality Date    GANGLION CYST EXCISION Left 3/25/2024    Procedure: EXCISION MUCOID CYST, INDEX FINGER DIP;  Surgeon: Carroll Mccarthy MD;  Location: [facility];  Service: Orthopedics    GANGLION CYST EXCISION Right 5/20/2024    Procedure: EXCISION MUCOID CYST - RIGHT INDEX AND MIDDLE FINGERS;  Surgeon: Carroll Mccarthy MD;  Location: [facility];  Service: Orthopedics    HERNIA REPAIR      INCISION AND DRAINAGE  01/16/2024    KIDNEY SURGERY      KNEE SURGERY  1980    MOUTH SURGERY      KS AMPUTATION METATARSAL W/TOE SINGLE Left 6/1/2022    Procedure: RAY RESECTION FOOT;  Surgeon: Hannah Melgoza DPM;  Location: [facility];  Service: Podiatry    KS RPR AA HERNIA 1ST < 3 CM REDUCIBLE N/A 7/14/2023    Procedure: REPAIR HERNIA INCISIONAL;  Surgeon: Matt Melo MD;  [facility];  Service: General    WOUND DEBRIDEMENT Left 4/28/2022    Procedure: Left foot washout;  Surgeon: Hannah Melgoza DPM;  [facility];  Service: Podiatry    WOUND DEBRIDEMENT Left 6/6/2022    Procedure: DEBRIDEMENT WOUND (WASH OUT);  Surgeon: Hannah Melgoza DPM;  [facility];  Service: Podiatry     Family History   Problem Relation Age of Onset    Diabetes Paternal Grandmother     Diabetes Paternal Grandfather        JAN Mcclain  Date: 9/16/2024 Time: 10:32 AM

## 2024-09-16 NOTE — PLAN OF CARE
Problem: PAIN - ADULT  Goal: Verbalizes/displays adequate comfort level or baseline comfort level  Description: Interventions:  - Encourage patient to monitor pain and request assistance  - Assess pain using appropriate pain scale  - Administer analgesics based on type and severity of pain and evaluate response  - Implement non-pharmacological measures as appropriate and evaluate response  - Consider cultural and social influences on pain and pain management  - Notify physician/advanced practitioner if interventions unsuccessful or patient reports new pain  Outcome: Progressing     Problem: INFECTION - ADULT  Goal: Absence or prevention of progression during hospitalization  Description: INTERVENTIONS:  - Assess and monitor for signs and symptoms of infection  - Monitor lab/diagnostic results  - Monitor all insertion sites, i.e. indwelling lines, tubes, and drains  - Monitor endotracheal if appropriate and nasal secretions for changes in amount and color  - [place] appropriate cooling/warming therapies per order  - Administer medications as ordered  - Instruct and encourage patient and family to use good hand hygiene technique  - Identify and instruct in appropriate isolation precautions for identified infection/condition  Outcome: Progressing     Problem: SAFETY ADULT  Goal: Patient will remain free of falls  Description: INTERVENTIONS:  - Educate patient/family on patient safety including physical limitations  - Instruct patient to call for assistance with activity   - Consult OT/PT to assist with strengthening/mobility   - Keep Call bell within reach  - Keep bed low and locked with side rails adjusted as appropriate  - Keep care items and personal belongings within reach  - Initiate and maintain comfort rounds  - Make Fall Risk Sign visible to staff  - Apply yellow socks and bracelet for high fall risk patients  - Consider moving patient to room near nurses station  Outcome: Progressing  Goal: Maintain or  return to baseline ADL function  Description: INTERVENTIONS:  -  Assess patient's ability to carry out ADLs; assess patient's baseline for ADL function and identify physical deficits which impact ability to perform ADLs (bathing, care of mouth/teeth, toileting, grooming, dressing, etc.)  - Assess/evaluate cause of self-care deficits   - Assess range of motion  - Assess patient's mobility; develop plan if impaired  - Assess patient's need for assistive devices and provide as appropriate  - Encourage maximum independence but intervene and supervise when necessary  - Involve family in performance of ADLs  - Assess for home care needs following discharge   - Consider OT consult to assist with ADL evaluation and planning for discharge  - Provide patient education as appropriate  Outcome: Progressing     Problem: DISCHARGE PLANNING  Goal: Discharge to home or other facility with appropriate resources  Description: INTERVENTIONS:  - Identify barriers to discharge w/patient and caregiver  - Arrange for needed discharge resources and transportation as appropriate  - Identify discharge learning needs (meds, wound care, etc.)  - Arrange for interpretive services to assist at discharge as needed  - Refer to Case Management Department for coordinating discharge planning if the patient needs post-hospital services based on physician/advanced practitioner order or complex needs related to functional status, cognitive ability, or social support system  Outcome: Progressing     Problem: Knowledge Deficit  Goal: Patient/family/caregiver demonstrates understanding of disease process, treatment plan, medications, and discharge instructions  Description: Complete learning assessment and assess knowledge base.  Interventions:  - Provide teaching at level of understanding  - Provide teaching via preferred learning methods  Outcome: Progressing     Problem: CARDIOVASCULAR - ADULT  Goal: Maintains optimal cardiac output and hemodynamic  stability  Description: INTERVENTIONS:  - Monitor I/O, vital signs and rhythm  - Monitor for S/S and trends of decreased cardiac output  - Administer and titrate ordered vasoactive medications to optimize hemodynamic stability  - Assess quality of pulses, skin color and temperature  - Assess for signs of decreased coronary artery perfusion  - Instruct patient to report change in severity of symptoms  Outcome: Progressing     Problem: RESPIRATORY - ADULT  Goal: Achieves optimal ventilation and oxygenation  Description: INTERVENTIONS:  - Assess for changes in respiratory status  - Assess for changes in mentation and behavior  - Position to facilitate oxygenation and minimize respiratory effort  - Oxygen administered by appropriate delivery if ordered  - Initiate smoking cessation education as indicated  - Encourage broncho-pulmonary hygiene including cough, deep breathe, Incentive Spirometry  - Assess the need for suctioning and aspirate as needed  - Assess and instruct to report SOB or any respiratory difficulty  - Respiratory Therapy support as indicated  Outcome: Progressing     Problem: GASTROINTESTINAL - ADULT  Goal: Minimal or absence of nausea and/or vomiting  Description: INTERVENTIONS:  - Administer IV fluids if ordered to ensure adequate hydration  - Maintain NPO status until nausea and vomiting are resolved  - Nasogastric tube if ordered  - Administer ordered antiemetic medications as needed  - Provide nonpharmacologic comfort measures as appropriate  - Advance diet as tolerated, if ordered  - Consider nutrition services referral to assist patient with adequate nutrition and appropriate food choices  Outcome: Progressing  Goal: Maintains or returns to baseline bowel function  Description: INTERVENTIONS:  - Assess bowel function  - Encourage oral fluids to ensure adequate hydration  - Administer IV fluids if ordered to ensure adequate hydration  - Administer ordered medications as needed  - Encourage  mobilization and activity  - Consider nutritional services referral to assist patient with adequate nutrition and appropriate food choices  Outcome: Progressing  Goal: Maintains adequate nutritional intake  Description: INTERVENTIONS:  - Monitor percentage of each meal consumed  - Identify factors contributing to decreased intake, treat as appropriate  - Assist with meals as needed  - Monitor I&O, weight, and lab values if indicated  - Obtain nutrition services referral as needed  Outcome: Progressing     Problem: GENITOURINARY - ADULT  Goal: Maintains or returns to baseline urinary function  Description: INTERVENTIONS:  - Assess urinary function  - Encourage oral fluids to ensure adequate hydration if ordered  - Administer IV fluids as ordered to ensure adequate hydration  - Administer ordered medications as needed  - Offer frequent toileting  - Follow urinary retention protocol if ordered  Outcome: Progressing  Goal: Absence of urinary retention  Description: INTERVENTIONS:  - Assess patient’s ability to void and empty bladder  - Monitor I/O  - Bladder scan as needed  - Discuss with physician/AP medications to alleviate retention as needed  - Discuss catheterization for long term situations as appropriate  Outcome: Progressing     Problem: METABOLIC, FLUID AND ELECTROLYTES - ADULT  Goal: Electrolytes maintained within normal limits  Description: INTERVENTIONS:  - Monitor labs and assess patient for signs and symptoms of electrolyte imbalances  - Administer electrolyte replacement as ordered  - Monitor response to electrolyte replacements, including repeat lab results as appropriate  - Instruct patient on fluid and nutrition as appropriate  Outcome: Progressing  Goal: Fluid balance maintained  Description: INTERVENTIONS:  - Monitor labs   - Monitor I/O and WT  - Instruct patient on fluid and nutrition as appropriate  - Assess for signs & symptoms of volume excess or deficit  Outcome: Progressing     Problem:  SKIN/TISSUE INTEGRITY - ADULT  Goal: Incision(s), wounds(s) or drain site(s) healing without S/S of infection  Description: INTERVENTIONS  - Assess and document dressing, incision, wound bed, drain sites and surrounding tissue  - Provide patient and family education  - Perform skin care/dressing changes   Outcome: Progressing     Problem: HEMATOLOGIC - ADULT  Goal: Maintains hematologic stability  Description: INTERVENTIONS  - Assess for signs and symptoms of bleeding or hemorrhage  - Monitor labs  - Administer supportive blood products/factors as ordered and appropriate  Outcome: Progressing     Problem: MUSCULOSKELETAL - ADULT  Goal: Maintain or return mobility to safest level of function  Description: INTERVENTIONS:  - Assess patient's ability to carry out ADLs; assess patient's baseline for ADL function and identify physical deficits which impact ability to perform ADLs (bathing, care of mouth/teeth, toileting, grooming, dressing, etc.)  - Assess/evaluate cause of self-care deficits   - Assess range of motion  - Assess patient's mobility  - Assess patient's need for assistive devices and provide as appropriate  - Encourage maximum independence but intervene and supervise when necessary  - Involve family in performance of ADLs  - Assess for home care needs following discharge   - Consider OT consult to assist with ADL evaluation and planning for discharge  - Provide patient education as appropriate  Outcome: Progressing

## 2024-09-16 NOTE — PROGRESS NOTES
Progress Note - Orthopedics   Name: Yoni Castillo 59 y.o. male I MRN: [id number]  Unit/Bed#: E5 -01 I Date of Admission: 9/11/2024   Date of Service: 9/16/2024 I Hospital Day: 4    Assessment & Plan  Cellulitis of hand, right  after dog bite, volar and dorsal  Erythema and swelling still more isolated and swollen in the dorsal wrist today but less swollen than yesterday.  Slightly less tender and improved finger and wrist ROM compared to yesterday. No obvious fluctuance.  aseptic appearing  Low concern for flexor infectious tenosynovitis, necrotizing fasciitis or septic arthritis.  Given some improvement after bedside I&D there is no indication for operative intervention today.   Will exam progress again tomorrow and if he worsens he may require operative debridement tomorrow.   Recommend to continue IV Abx per primary team at this time. Patient will be re-evaluated in the a.m. for possible OR  Recommend n.p.o. at midnight for possible OR tomorrow  Continue warm soapy soaks 3 times daily   Continue Strict elevation to the RUE and ice to the area  Pain control PRN  Medical management per primary   Ortho will continue to follow     Dog bite  -See above management  Essential hypertension  -Management per primary team    Type 2 diabetes mellitus with chronic kidney disease, without long-term current use of insulin ([facility])  Lab Results   Component Value Date    HGBA1C 8.0 (H) 06/12/2024       Recent Labs     09/15/24  0736 09/15/24  1106 09/15/24  1554 09/15/24  2100   POCGLU 130 163* 160* 170*       Blood Sugar Average: Last 72 hrs:  (P) [id number]    -Management per primary team encourage keeping blood glucose levels less than 180 to help control this infection.  Currently well-managed.  Neuropathy  -Management per primary team  Chronic kidney disease, stage 3 ([facility])  Lab Results   Component Value Date    EGFR 54 09/16/2024    EGFR 44 09/15/2024    EGFR 37 09/14/2024    CREATININE 1.38 (H) 09/16/2024     CREATININE 1.63 (H) 09/15/2024    CREATININE 1.88 (H) 09/14/2024   -Management per primary team  Staghorn calculus          History of Present Illness   59 y.o.male  No acute events, no new complaints. Pain well controlled this morning but state he had a lot of pain last night for a brief period. Was able to sleep better last night. Denies fevers, chills, CP, SOB, N/V, numbness or tingling. Patient reports no issues with urination or bowel movements.     Objective      Temp:  [97.9 °F (36.6 °C)] 97.9 °F (36.6 °C)  HR:  [71-79] 79  Resp:  [17] 17  BP: (137-146)/(83-94) 140/84  O2 Device: None (Room air)          I/O last 24 hours:  In: 1320 [P.O.:360; I.V.:960]  Out: -   Lines/Drains/Airways       Active Status       None                  Physical Exam   RUE:  Bandage clean and dry, removed  There is swelling distal and proximal to the Ace wrap with improved swelling at the area underneath the Ace wrap.    Slightly improved erythema over the dorsal wrist and hand  Still an isolated area of firm indurated swelling over the dorsal wrist at the area of the I&D site.  Less tender at this area than yesterday but this is still the area of maximal tenderness  No palpable fluctuance.  No expressible fluid.  Wrist range of motion approximate 20 degrees extension 20 reflection without significant discomfort able to make approximately 25% composite fist.  No pain with passive stretch of the fingers.  Sensation tact light touch in the radial, ulnar, median nerve distributions  Warm well-perfused fingers      Lab Results: I have reviewed the following results:   Recent Labs     09/14/24  0456 09/15/24  0602 09/16/24  0500   WBC 6.46 5.15 4.91   HGB 11.4* 11.2* 10.8*   HCT 34.1* 33.5* 31.9*    201 195   BUN 34* 33* 30*   CREATININE 1.88* 1.63* 1.38*     Blood Culture:    Lab Results   Component Value Date    BLOODCX No Growth After 5 Days. 11/28/2023     Wound Culture:   Lab Results   Component Value Date    WOUNDCULT 2+  Growth of 04/26/2024

## 2024-09-16 NOTE — ASSESSMENT & PLAN NOTE
Diagnosed on previous ED  Urology consulted, recommend outpatient follow up on 10/10 to further discuss

## 2024-09-16 NOTE — PLAN OF CARE
Problem: PAIN - ADULT  Goal: Verbalizes/displays adequate comfort level or baseline comfort level  Description: Interventions:  - Encourage patient to monitor pain and request assistance  - Assess pain using appropriate pain scale  - Administer analgesics based on type and severity of pain and evaluate response  - Implement non-pharmacological measures as appropriate and evaluate response  - Consider cultural and social influences on pain and pain management  - Notify physician/advanced practitioner if interventions unsuccessful or patient reports new pain  Outcome: Progressing     Problem: INFECTION - ADULT  Goal: Absence or prevention of progression during hospitalization  Description: INTERVENTIONS:  - Assess and monitor for signs and symptoms of infection  - Monitor lab/diagnostic results  - Monitor all insertion sites, i.e. indwelling lines, tubes, and drains  - Monitor endotracheal if appropriate and nasal secretions for changes in amount and color  - [place] appropriate cooling/warming therapies per order  - Administer medications as ordered  - Instruct and encourage patient and family to use good hand hygiene technique  - Identify and instruct in appropriate isolation precautions for identified infection/condition  Outcome: Progressing     Problem: SAFETY ADULT  Goal: Patient will remain free of falls  Description: INTERVENTIONS:  - Educate patient/family on patient safety including physical limitations  - Instruct patient to call for assistance with activity   - Consult OT/PT to assist with strengthening/mobility   - Keep Call bell within reach  - Keep bed low and locked with side rails adjusted as appropriate  - Keep care items and personal belongings within reach  - Initiate and maintain comfort rounds  - Make Fall Risk Sign visible to staff  - Apply yellow socks and bracelet for high fall risk patients  - Consider moving patient to room near nurses station  Outcome: Progressing  Goal: Maintain or  return to baseline ADL function  Description: INTERVENTIONS:  -  Assess patient's ability to carry out ADLs; assess patient's baseline for ADL function and identify physical deficits which impact ability to perform ADLs (bathing, care of mouth/teeth, toileting, grooming, dressing, etc.)  - Assess/evaluate cause of self-care deficits   - Assess range of motion  - Assess patient's mobility; develop plan if impaired  - Assess patient's need for assistive devices and provide as appropriate  - Encourage maximum independence but intervene and supervise when necessary  - Involve family in performance of ADLs  - Assess for home care needs following discharge   - Consider OT consult to assist with ADL evaluation and planning for discharge  - Provide patient education as appropriate  Outcome: Progressing     Problem: DISCHARGE PLANNING  Goal: Discharge to home or other facility with appropriate resources  Description: INTERVENTIONS:  - Identify barriers to discharge w/patient and caregiver  - Arrange for needed discharge resources and transportation as appropriate  - Identify discharge learning needs (meds, wound care, etc.)  - Arrange for interpretive services to assist at discharge as needed  - Refer to Case Management Department for coordinating discharge planning if the patient needs post-hospital services based on physician/advanced practitioner order or complex needs related to functional status, cognitive ability, or social support system  Outcome: Progressing     Problem: Knowledge Deficit  Goal: Patient/family/caregiver demonstrates understanding of disease process, treatment plan, medications, and discharge instructions  Description: Complete learning assessment and assess knowledge base.  Interventions:  - Provide teaching at level of understanding  - Provide teaching via preferred learning methods  Outcome: Progressing     Problem: CARDIOVASCULAR - ADULT  Goal: Maintains optimal cardiac output and hemodynamic  stability  Description: INTERVENTIONS:  - Monitor I/O, vital signs and rhythm  - Monitor for S/S and trends of decreased cardiac output  - Administer and titrate ordered vasoactive medications to optimize hemodynamic stability  - Assess quality of pulses, skin color and temperature  - Assess for signs of decreased coronary artery perfusion  - Instruct patient to report change in severity of symptoms  Outcome: Progressing     Problem: RESPIRATORY - ADULT  Goal: Achieves optimal ventilation and oxygenation  Description: INTERVENTIONS:  - Assess for changes in respiratory status  - Assess for changes in mentation and behavior  - Position to facilitate oxygenation and minimize respiratory effort  - Oxygen administered by appropriate delivery if ordered  - Initiate smoking cessation education as indicated  - Encourage broncho-pulmonary hygiene including cough, deep breathe, Incentive Spirometry  - Assess the need for suctioning and aspirate as needed  - Assess and instruct to report SOB or any respiratory difficulty  - Respiratory Therapy support as indicated  Outcome: Progressing     Problem: GASTROINTESTINAL - ADULT  Goal: Minimal or absence of nausea and/or vomiting  Description: INTERVENTIONS:  - Administer IV fluids if ordered to ensure adequate hydration  - Maintain NPO status until nausea and vomiting are resolved  - Nasogastric tube if ordered  - Administer ordered antiemetic medications as needed  - Provide nonpharmacologic comfort measures as appropriate  - Advance diet as tolerated, if ordered  - Consider nutrition services referral to assist patient with adequate nutrition and appropriate food choices  Outcome: Progressing  Goal: Maintains or returns to baseline bowel function  Description: INTERVENTIONS:  - Assess bowel function  - Encourage oral fluids to ensure adequate hydration  - Administer IV fluids if ordered to ensure adequate hydration  - Administer ordered medications as needed  - Encourage  mobilization and activity  - Consider nutritional services referral to assist patient with adequate nutrition and appropriate food choices  Outcome: Progressing  Goal: Maintains adequate nutritional intake  Description: INTERVENTIONS:  - Monitor percentage of each meal consumed  - Identify factors contributing to decreased intake, treat as appropriate  - Assist with meals as needed  - Monitor I&O, weight, and lab values if indicated  - Obtain nutrition services referral as needed  Outcome: Progressing     Problem: GENITOURINARY - ADULT  Goal: Maintains or returns to baseline urinary function  Description: INTERVENTIONS:  - Assess urinary function  - Encourage oral fluids to ensure adequate hydration if ordered  - Administer IV fluids as ordered to ensure adequate hydration  - Administer ordered medications as needed  - Offer frequent toileting  - Follow urinary retention protocol if ordered  Outcome: Progressing  Goal: Absence of urinary retention  Description: INTERVENTIONS:  - Assess patient’s ability to void and empty bladder  - Monitor I/O  - Bladder scan as needed  - Discuss with physician/AP medications to alleviate retention as needed  - Discuss catheterization for long term situations as appropriate  Outcome: Progressing     Problem: METABOLIC, FLUID AND ELECTROLYTES - ADULT  Goal: Electrolytes maintained within normal limits  Description: INTERVENTIONS:  - Monitor labs and assess patient for signs and symptoms of electrolyte imbalances  - Administer electrolyte replacement as ordered  - Monitor response to electrolyte replacements, including repeat lab results as appropriate  - Instruct patient on fluid and nutrition as appropriate  Outcome: Progressing  Goal: Fluid balance maintained  Description: INTERVENTIONS:  - Monitor labs   - Monitor I/O and WT  - Instruct patient on fluid and nutrition as appropriate  - Assess for signs & symptoms of volume excess or deficit  Outcome: Progressing     Problem:  SKIN/TISSUE INTEGRITY - ADULT  Goal: Incision(s), wounds(s) or drain site(s) healing without S/S of infection  Description: INTERVENTIONS  - Assess and document dressing, incision, wound bed, drain sites and surrounding tissue  - Provide patient and family education  - Perform skin care/dressing changes   Outcome: Progressing     Problem: HEMATOLOGIC - ADULT  Goal: Maintains hematologic stability  Description: INTERVENTIONS  - Assess for signs and symptoms of bleeding or hemorrhage  - Monitor labs  - Administer supportive blood products/factors as ordered and appropriate  Outcome: Progressing     Problem: MUSCULOSKELETAL - ADULT  Goal: Maintain or return mobility to safest level of function  Description: INTERVENTIONS:  - Assess patient's ability to carry out ADLs; assess patient's baseline for ADL function and identify physical deficits which impact ability to perform ADLs (bathing, care of mouth/teeth, toileting, grooming, dressing, etc.)  - Assess/evaluate cause of self-care deficits   - Assess range of motion  - Assess patient's mobility  - Assess patient's need for assistive devices and provide as appropriate  - Encourage maximum independence but intervene and supervise when necessary  - Involve family in performance of ADLs  - Assess for home care needs following discharge   - Consider OT consult to assist with ADL evaluation and planning for discharge  - Provide patient education as appropriate  Outcome: Progressing

## 2024-09-17 ENCOUNTER — ANESTHESIA EVENT (INPATIENT)
Dept: PERIOP | Facility: HOSPITAL | Age: 59
End: 2024-09-17
Payer: MEDICARE

## 2024-09-17 ENCOUNTER — ANESTHESIA (INPATIENT)
Dept: PERIOP | Facility: HOSPITAL | Age: 59
End: 2024-09-17
Payer: MEDICARE

## 2024-09-17 LAB
ANION GAP SERPL CALCULATED.3IONS-SCNC: 7 MMOL/L (ref 4–13)
BUN SERPL-MCNC: 25 MG/DL (ref 5–25)
CALCIUM SERPL-MCNC: 8.4 MG/DL (ref 8.4–10.2)
CHLORIDE SERPL-SCNC: 104 MMOL/L (ref 96–108)
CO2 SERPL-SCNC: 24 MMOL/L (ref 21–32)
CREAT SERPL-MCNC: 1.32 MG/DL (ref 0.6–1.3)
ERYTHROCYTE [DISTWIDTH] IN BLOOD BY AUTOMATED COUNT: 13.4 % (ref 11.6–15.1)
GFR SERPL CREATININE-BSD FRML MDRD: 57 ML/MIN/1.73SQ M
GLUCOSE SERPL-MCNC: 106 MG/DL (ref 65–140)
GLUCOSE SERPL-MCNC: 108 MG/DL (ref 65–140)
GLUCOSE SERPL-MCNC: 110 MG/DL (ref 65–140)
GLUCOSE SERPL-MCNC: 124 MG/DL (ref 65–140)
GLUCOSE SERPL-MCNC: 96 MG/DL (ref 65–140)
HCT VFR BLD AUTO: 32.8 % (ref 36.5–49.3)
HGB BLD-MCNC: 10.8 G/DL (ref 12–17)
MCH RBC QN AUTO: 30.9 PG (ref 26.8–34.3)
MCHC RBC AUTO-ENTMCNC: 32.9 G/DL (ref 31.4–37.4)
MCV RBC AUTO: 94 FL (ref 82–98)
PLATELET # BLD AUTO: 189 THOUSANDS/UL (ref 149–390)
PMV BLD AUTO: 9.7 FL (ref 8.9–12.7)
POTASSIUM SERPL-SCNC: 4 MMOL/L (ref 3.5–5.3)
RBC # BLD AUTO: 3.5 MILLION/UL (ref 3.88–5.62)
SODIUM SERPL-SCNC: 135 MMOL/L (ref 135–147)
WBC # BLD AUTO: 4.15 THOUSAND/UL (ref 4.31–10.16)

## 2024-09-17 PROCEDURE — 87205 SMEAR GRAM STAIN: CPT | Performed by: ORTHOPAEDIC SURGERY

## 2024-09-17 PROCEDURE — 25116 REMOVE WRIST/FOREARM LESION: CPT | Performed by: ORTHOPAEDIC SURGERY

## 2024-09-17 PROCEDURE — 80048 BASIC METABOLIC PNL TOTAL CA: CPT | Performed by: STUDENT IN AN ORGANIZED HEALTH CARE EDUCATION/TRAINING PROGRAM

## 2024-09-17 PROCEDURE — 82948 REAGENT STRIP/BLOOD GLUCOSE: CPT

## 2024-09-17 PROCEDURE — 0J9G0ZZ DRAINAGE OF RIGHT LOWER ARM SUBCUTANEOUS TISSUE AND FASCIA, OPEN APPROACH: ICD-10-PCS | Performed by: ORTHOPAEDIC SURGERY

## 2024-09-17 PROCEDURE — 25115 REMOVE WRIST/FOREARM LESION: CPT | Performed by: ORTHOPAEDIC SURGERY

## 2024-09-17 PROCEDURE — 87075 CULTR BACTERIA EXCEPT BLOOD: CPT | Performed by: ORTHOPAEDIC SURGERY

## 2024-09-17 PROCEDURE — 0LB50ZZ EXCISION OF RIGHT LOWER ARM AND WRIST TENDON, OPEN APPROACH: ICD-10-PCS | Performed by: ORTHOPAEDIC SURGERY

## 2024-09-17 PROCEDURE — 25115 REMOVE WRIST/FOREARM LESION: CPT

## 2024-09-17 PROCEDURE — 25116 REMOVE WRIST/FOREARM LESION: CPT

## 2024-09-17 PROCEDURE — 99232 SBSQ HOSP IP/OBS MODERATE 35: CPT | Performed by: STUDENT IN AN ORGANIZED HEALTH CARE EDUCATION/TRAINING PROGRAM

## 2024-09-17 PROCEDURE — 0RBN0ZZ EXCISION OF RIGHT WRIST JOINT, OPEN APPROACH: ICD-10-PCS | Performed by: ORTHOPAEDIC SURGERY

## 2024-09-17 PROCEDURE — 99024 POSTOP FOLLOW-UP VISIT: CPT | Performed by: ORTHOPAEDIC SURGERY

## 2024-09-17 PROCEDURE — 87070 CULTURE OTHR SPECIMN AEROBIC: CPT | Performed by: ORTHOPAEDIC SURGERY

## 2024-09-17 PROCEDURE — 85027 COMPLETE CBC AUTOMATED: CPT | Performed by: STUDENT IN AN ORGANIZED HEALTH CARE EDUCATION/TRAINING PROGRAM

## 2024-09-17 PROCEDURE — 87176 TISSUE HOMOGENIZATION CULTR: CPT | Performed by: ORTHOPAEDIC SURGERY

## 2024-09-17 RX ORDER — MIDAZOLAM HYDROCHLORIDE 2 MG/2ML
INJECTION, SOLUTION INTRAMUSCULAR; INTRAVENOUS AS NEEDED
Status: DISCONTINUED | OUTPATIENT
Start: 2024-09-17 | End: 2024-09-17

## 2024-09-17 RX ORDER — ONDANSETRON 2 MG/ML
INJECTION INTRAMUSCULAR; INTRAVENOUS AS NEEDED
Status: DISCONTINUED | OUTPATIENT
Start: 2024-09-17 | End: 2024-09-17

## 2024-09-17 RX ORDER — SODIUM CHLORIDE 9 MG/ML
125 INJECTION, SOLUTION INTRAVENOUS CONTINUOUS
Status: CANCELLED | OUTPATIENT
Start: 2024-09-17

## 2024-09-17 RX ORDER — BUPIVACAINE HYDROCHLORIDE 2.5 MG/ML
INJECTION, SOLUTION EPIDURAL; INFILTRATION; INTRACAUDAL AS NEEDED
Status: DISCONTINUED | OUTPATIENT
Start: 2024-09-17 | End: 2024-09-17 | Stop reason: HOSPADM

## 2024-09-17 RX ORDER — FENTANYL CITRATE/PF 50 MCG/ML
50 SYRINGE (ML) INJECTION
Status: DISCONTINUED | OUTPATIENT
Start: 2024-09-17 | End: 2024-09-17 | Stop reason: HOSPADM

## 2024-09-17 RX ORDER — PROPOFOL 10 MG/ML
INJECTION, EMULSION INTRAVENOUS AS NEEDED
Status: DISCONTINUED | OUTPATIENT
Start: 2024-09-17 | End: 2024-09-17

## 2024-09-17 RX ORDER — SODIUM CHLORIDE, SODIUM LACTATE, POTASSIUM CHLORIDE, CALCIUM CHLORIDE 600; 310; 30; 20 MG/100ML; MG/100ML; MG/100ML; MG/100ML
INJECTION, SOLUTION INTRAVENOUS CONTINUOUS PRN
Status: DISCONTINUED | OUTPATIENT
Start: 2024-09-17 | End: 2024-09-17

## 2024-09-17 RX ORDER — FENTANYL CITRATE 50 UG/ML
INJECTION, SOLUTION INTRAMUSCULAR; INTRAVENOUS AS NEEDED
Status: DISCONTINUED | OUTPATIENT
Start: 2024-09-17 | End: 2024-09-17

## 2024-09-17 RX ORDER — PHENYLEPHRINE HCL IN 0.9% NACL 1 MG/10 ML
SYRINGE (ML) INTRAVENOUS AS NEEDED
Status: DISCONTINUED | OUTPATIENT
Start: 2024-09-17 | End: 2024-09-17

## 2024-09-17 RX ORDER — EPHEDRINE SULFATE 50 MG/ML
INJECTION INTRAVENOUS AS NEEDED
Status: DISCONTINUED | OUTPATIENT
Start: 2024-09-17 | End: 2024-09-17

## 2024-09-17 RX ORDER — LIDOCAINE HYDROCHLORIDE 20 MG/ML
INJECTION, SOLUTION EPIDURAL; INFILTRATION; INTRACAUDAL; PERINEURAL AS NEEDED
Status: DISCONTINUED | OUTPATIENT
Start: 2024-09-17 | End: 2024-09-17

## 2024-09-17 RX ORDER — ONDANSETRON 2 MG/ML
4 INJECTION INTRAMUSCULAR; INTRAVENOUS ONCE AS NEEDED
Status: DISCONTINUED | OUTPATIENT
Start: 2024-09-17 | End: 2024-09-17 | Stop reason: HOSPADM

## 2024-09-17 RX ADMIN — AMPICILLIN SODIUM AND SULBACTAM SODIUM 3 G: 2; 1 INJECTION, POWDER, FOR SOLUTION INTRAMUSCULAR; INTRAVENOUS at 17:21

## 2024-09-17 RX ADMIN — LIDOCAINE 5% 1 PATCH: 700 PATCH TOPICAL at 09:13

## 2024-09-17 RX ADMIN — SODIUM CHLORIDE, SODIUM GLUCONATE, SODIUM ACETATE, POTASSIUM CHLORIDE, MAGNESIUM CHLORIDE, SODIUM PHOSPHATE, DIBASIC, AND POTASSIUM PHOSPHATE 100 ML/HR: .53; .5; .37; .037; .03; .012; .00082 INJECTION, SOLUTION INTRAVENOUS at 05:15

## 2024-09-17 RX ADMIN — CARVEDILOL 25 MG: 25 TABLET, FILM COATED ORAL at 09:10

## 2024-09-17 RX ADMIN — FENTANYL CITRATE 25 MCG: 50 INJECTION INTRAMUSCULAR; INTRAVENOUS at 20:25

## 2024-09-17 RX ADMIN — CARVEDILOL 25 MG: 25 TABLET, FILM COATED ORAL at 16:57

## 2024-09-17 RX ADMIN — MORPHINE SULFATE 4 MG: 4 INJECTION INTRAVENOUS at 00:59

## 2024-09-17 RX ADMIN — PHENYLEPHRINE HYDROCHLORIDE 50 MCG/MIN: 10 INJECTION INTRAVENOUS at 19:05

## 2024-09-17 RX ADMIN — EPHEDRINE SULFATE 5 MG: 50 INJECTION INTRAVENOUS at 19:57

## 2024-09-17 RX ADMIN — FENTANYL CITRATE 50 MCG: 50 INJECTION INTRAMUSCULAR; INTRAVENOUS at 20:44

## 2024-09-17 RX ADMIN — ONDANSETRON 4 MG: 2 INJECTION INTRAMUSCULAR; INTRAVENOUS at 18:50

## 2024-09-17 RX ADMIN — AMPICILLIN SODIUM AND SULBACTAM SODIUM 3 G: 2; 1 INJECTION, POWDER, FOR SOLUTION INTRAMUSCULAR; INTRAVENOUS at 05:11

## 2024-09-17 RX ADMIN — ACETAMINOPHEN 975 MG: 325 TABLET ORAL at 17:21

## 2024-09-17 RX ADMIN — ACETAMINOPHEN 975 MG: 325 TABLET ORAL at 22:37

## 2024-09-17 RX ADMIN — MIDAZOLAM 2 MG: 1 INJECTION INTRAMUSCULAR; INTRAVENOUS at 18:42

## 2024-09-17 RX ADMIN — FENTANYL CITRATE 50 MCG: 50 INJECTION INTRAMUSCULAR; INTRAVENOUS at 18:46

## 2024-09-17 RX ADMIN — SODIUM CHLORIDE, SODIUM LACTATE, POTASSIUM CHLORIDE, AND CALCIUM CHLORIDE: .6; .31; .03; .02 INJECTION, SOLUTION INTRAVENOUS at 17:39

## 2024-09-17 RX ADMIN — Medication 200 MCG: at 19:05

## 2024-09-17 RX ADMIN — EPHEDRINE SULFATE 10 MG: 50 INJECTION INTRAVENOUS at 19:53

## 2024-09-17 RX ADMIN — Medication 100 MCG: at 18:59

## 2024-09-17 RX ADMIN — EPHEDRINE SULFATE 10 MG: 50 INJECTION INTRAVENOUS at 19:10

## 2024-09-17 RX ADMIN — PROPOFOL 200 MG: 10 INJECTION, EMULSION INTRAVENOUS at 18:48

## 2024-09-17 RX ADMIN — EPHEDRINE SULFATE 10 MG: 50 INJECTION INTRAVENOUS at 19:06

## 2024-09-17 RX ADMIN — ACETAMINOPHEN 975 MG: 325 TABLET ORAL at 05:15

## 2024-09-17 RX ADMIN — OXYCODONE HYDROCHLORIDE AND ACETAMINOPHEN 1000 MG: 500 TABLET ORAL at 09:10

## 2024-09-17 RX ADMIN — MORPHINE SULFATE 4 MG: 4 INJECTION INTRAVENOUS at 22:34

## 2024-09-17 RX ADMIN — LIDOCAINE HYDROCHLORIDE 100 MG: 20 INJECTION, SOLUTION EPIDURAL; INFILTRATION; INTRACAUDAL at 18:48

## 2024-09-17 RX ADMIN — OXYCODONE 5 MG: 5 TABLET ORAL at 06:21

## 2024-09-17 RX ADMIN — Medication 100 MCG: at 19:01

## 2024-09-17 RX ADMIN — AMPICILLIN SODIUM AND SULBACTAM SODIUM 3 G: 2; 1 INJECTION, POWDER, FOR SOLUTION INTRAMUSCULAR; INTRAVENOUS at 11:18

## 2024-09-17 RX ADMIN — MORPHINE SULFATE 4 MG: 4 INJECTION INTRAVENOUS at 09:06

## 2024-09-17 RX ADMIN — FENTANYL CITRATE 25 MCG: 50 INJECTION INTRAMUSCULAR; INTRAVENOUS at 19:47

## 2024-09-17 NOTE — ASSESSMENT & PLAN NOTE
after dog bite, volar and dorsal  Erythema and swelling still more isolated and swollen in the dorsal wrist today with similar or slightly worsened pain to touch today.  It is slightly improved motion of his wrist with flexion but he has significant pain with attempted wrist extension actively.    His lack of continued given some improvement after bedside I&D today we discussed going to the operating room for a more extensive irrigation and debridement.  He agreed to this plan.  Risks of surgery include but are not limited to infection, bleeding, wound healing complications, damage to surrounding structures, need for another surgery, anesthetic complications.  He understands these risks and wishes to proceed.  Plan will be for to OR this evening  Recommend to continue IV Abx per primary team at this time. Patient will be re-evaluated in the a.m. for possible OR  N.p.o.   Hold warm soapy soaks to the  Continue Strict elevation to the RUE and ice to the area  Pain control PRN  Medical management per primary   Ortho will continue to follow

## 2024-09-17 NOTE — PLAN OF CARE
Problem: PAIN - ADULT  Goal: Verbalizes/displays adequate comfort level or baseline comfort level  Description: Interventions:  - Encourage patient to monitor pain and request assistance  - Assess pain using appropriate pain scale  - Administer analgesics based on type and severity of pain and evaluate response  - Implement non-pharmacological measures as appropriate and evaluate response  - Consider cultural and social influences on pain and pain management  - Notify physician/advanced practitioner if interventions unsuccessful or patient reports new pain  Outcome: Progressing     Problem: INFECTION - ADULT  Goal: Absence or prevention of progression during hospitalization  Description: INTERVENTIONS:  - Assess and monitor for signs and symptoms of infection  - Monitor lab/diagnostic results  - Monitor all insertion sites, i.e. indwelling lines, tubes, and drains  - Monitor endotracheal if appropriate and nasal secretions for changes in amount and color  - [place] appropriate cooling/warming therapies per order  - Administer medications as ordered  - Instruct and encourage patient and family to use good hand hygiene technique  - Identify and instruct in appropriate isolation precautions for identified infection/condition  Outcome: Progressing     Problem: SAFETY ADULT  Goal: Patient will remain free of falls  Description: INTERVENTIONS:  - Educate patient/family on patient safety including physical limitations  - Instruct patient to call for assistance with activity   - Consult OT/PT to assist with strengthening/mobility   - Keep Call bell within reach  - Keep bed low and locked with side rails adjusted as appropriate  - Keep care items and personal belongings within reach  - Initiate and maintain comfort rounds  - Make Fall Risk Sign visible to staff  - Apply yellow socks and bracelet for high fall risk patients  - Consider moving patient to room near nurses station  Outcome: Progressing  Goal: Maintain or  return to baseline ADL function  Description: INTERVENTIONS:  -  Assess patient's ability to carry out ADLs; assess patient's baseline for ADL function and identify physical deficits which impact ability to perform ADLs (bathing, care of mouth/teeth, toileting, grooming, dressing, etc.)  - Assess/evaluate cause of self-care deficits   - Assess range of motion  - Assess patient's mobility; develop plan if impaired  - Assess patient's need for assistive devices and provide as appropriate  - Encourage maximum independence but intervene and supervise when necessary  - Involve family in performance of ADLs  - Assess for home care needs following discharge   - Consider OT consult to assist with ADL evaluation and planning for discharge  - Provide patient education as appropriate  Outcome: Progressing     Problem: DISCHARGE PLANNING  Goal: Discharge to home or other facility with appropriate resources  Description: INTERVENTIONS:  - Identify barriers to discharge w/patient and caregiver  - Arrange for needed discharge resources and transportation as appropriate  - Identify discharge learning needs (meds, wound care, etc.)  - Arrange for interpretive services to assist at discharge as needed  - Refer to Case Management Department for coordinating discharge planning if the patient needs post-hospital services based on physician/advanced practitioner order or complex needs related to functional status, cognitive ability, or social support system  Outcome: Progressing     Problem: Knowledge Deficit  Goal: Patient/family/caregiver demonstrates understanding of disease process, treatment plan, medications, and discharge instructions  Description: Complete learning assessment and assess knowledge base.  Interventions:  - Provide teaching at level of understanding  - Provide teaching via preferred learning methods  Outcome: Progressing     Problem: CARDIOVASCULAR - ADULT  Goal: Maintains optimal cardiac output and hemodynamic  stability  Description: INTERVENTIONS:  - Monitor I/O, vital signs and rhythm  - Monitor for S/S and trends of decreased cardiac output  - Administer and titrate ordered vasoactive medications to optimize hemodynamic stability  - Assess quality of pulses, skin color and temperature  - Assess for signs of decreased coronary artery perfusion  - Instruct patient to report change in severity of symptoms  Outcome: Progressing     Problem: RESPIRATORY - ADULT  Goal: Achieves optimal ventilation and oxygenation  Description: INTERVENTIONS:  - Assess for changes in respiratory status  - Assess for changes in mentation and behavior  - Position to facilitate oxygenation and minimize respiratory effort  - Oxygen administered by appropriate delivery if ordered  - Initiate smoking cessation education as indicated  - Encourage broncho-pulmonary hygiene including cough, deep breathe, Incentive Spirometry  - Assess the need for suctioning and aspirate as needed  - Assess and instruct to report SOB or any respiratory difficulty  - Respiratory Therapy support as indicated  Outcome: Progressing     Problem: GASTROINTESTINAL - ADULT  Goal: Minimal or absence of nausea and/or vomiting  Description: INTERVENTIONS:  - Administer IV fluids if ordered to ensure adequate hydration  - Maintain NPO status until nausea and vomiting are resolved  - Nasogastric tube if ordered  - Administer ordered antiemetic medications as needed  - Provide nonpharmacologic comfort measures as appropriate  - Advance diet as tolerated, if ordered  - Consider nutrition services referral to assist patient with adequate nutrition and appropriate food choices  Outcome: Progressing  Goal: Maintains or returns to baseline bowel function  Description: INTERVENTIONS:  - Assess bowel function  - Encourage oral fluids to ensure adequate hydration  - Administer IV fluids if ordered to ensure adequate hydration  - Administer ordered medications as needed  - Encourage  mobilization and activity  - Consider nutritional services referral to assist patient with adequate nutrition and appropriate food choices  Outcome: Progressing  Goal: Maintains adequate nutritional intake  Description: INTERVENTIONS:  - Monitor percentage of each meal consumed  - Identify factors contributing to decreased intake, treat as appropriate  - Assist with meals as needed  - Monitor I&O, weight, and lab values if indicated  - Obtain nutrition services referral as needed  Outcome: Progressing     Problem: GENITOURINARY - ADULT  Goal: Maintains or returns to baseline urinary function  Description: INTERVENTIONS:  - Assess urinary function  - Encourage oral fluids to ensure adequate hydration if ordered  - Administer IV fluids as ordered to ensure adequate hydration  - Administer ordered medications as needed  - Offer frequent toileting  - Follow urinary retention protocol if ordered  Outcome: Progressing  Goal: Absence of urinary retention  Description: INTERVENTIONS:  - Assess patient’s ability to void and empty bladder  - Monitor I/O  - Bladder scan as needed  - Discuss with physician/AP medications to alleviate retention as needed  - Discuss catheterization for long term situations as appropriate  Outcome: Progressing     Problem: METABOLIC, FLUID AND ELECTROLYTES - ADULT  Goal: Electrolytes maintained within normal limits  Description: INTERVENTIONS:  - Monitor labs and assess patient for signs and symptoms of electrolyte imbalances  - Administer electrolyte replacement as ordered  - Monitor response to electrolyte replacements, including repeat lab results as appropriate  - Instruct patient on fluid and nutrition as appropriate  Outcome: Progressing  Goal: Fluid balance maintained  Description: INTERVENTIONS:  - Monitor labs   - Monitor I/O and WT  - Instruct patient on fluid and nutrition as appropriate  - Assess for signs & symptoms of volume excess or deficit  Outcome: Progressing     Problem:  SKIN/TISSUE INTEGRITY - ADULT  Goal: Incision(s), wounds(s) or drain site(s) healing without S/S of infection  Description: INTERVENTIONS  - Assess and document dressing, incision, wound bed, drain sites and surrounding tissue  - Provide patient and family education  - Perform skin care/dressing changes   Outcome: Progressing     Problem: HEMATOLOGIC - ADULT  Goal: Maintains hematologic stability  Description: INTERVENTIONS  - Assess for signs and symptoms of bleeding or hemorrhage  - Monitor labs  - Administer supportive blood products/factors as ordered and appropriate  Outcome: Progressing     Problem: MUSCULOSKELETAL - ADULT  Goal: Maintain or return mobility to safest level of function  Description: INTERVENTIONS:  - Assess patient's ability to carry out ADLs; assess patient's baseline for ADL function and identify physical deficits which impact ability to perform ADLs (bathing, care of mouth/teeth, toileting, grooming, dressing, etc.)  - Assess/evaluate cause of self-care deficits   - Assess range of motion  - Assess patient's mobility  - Assess patient's need for assistive devices and provide as appropriate  - Encourage maximum independence but intervene and supervise when necessary  - Involve family in performance of ADLs  - Assess for home care needs following discharge   - Consider OT consult to assist with ADL evaluation and planning for discharge  - Provide patient education as appropriate  Outcome: Progressing

## 2024-09-17 NOTE — ANESTHESIA PREPROCEDURE EVALUATION
Procedure:  Irrigation and debridement right wrist and hand, any indicated procedures (Right: Wrist)    Relevant Problems   CARDIO   (+) Essential hypertension      ENDO   (+) Type 2 diabetes mellitus with chronic kidney disease, without long-term current use of insulin ([facility])      /RENAL   (+) Chronic kidney disease, stage 3 ([facility])   (+) Staghorn calculus      MUSCULOSKELETAL   (+) Low back pain with sciatica      Surgery/Wound/Pain   (+) Dog bite      Other   (+) Class 2 severe obesity with serious comorbidity and body mass index (BMI) of 35.0 to 35.9 in adult ([facility])        Physical Exam    Airway    Mallampati score: II  TM Distance: >3 FB       Dental   No notable dental hx     Cardiovascular  Cardiovascular exam normal    Pulmonary  Pulmonary exam normal     Other Findings        Anesthesia Plan  ASA Score- 3     Anesthesia Type- general with ASA Monitors.         Additional Monitors:     Airway Plan:            Plan Factors-    Chart reviewed.        Patient is not a current smoker.      Obstructive sleep apnea risk education given perioperatively.        Induction- intravenous.    Postoperative Plan- Plan for postoperative opioid use. Planned trial extubation    Perioperative Resuscitation Plan - Level 1 - Full Code.       Informed Consent- Anesthetic plan and risks discussed with patient.

## 2024-09-17 NOTE — ASSESSMENT & PLAN NOTE
Lab Results   Component Value Date    HGBA1C 8.0 (H) 06/12/2024       Recent Labs     09/16/24  1105 09/16/24  1548 09/16/24  2108 09/17/24  0727   POCGLU 167* 195* 158* 106       Blood Sugar Average: Last 72 hrs:  (P) [id number]    -Management per primary team encourage keeping blood glucose levels less than 180 to help control this infection.  Currently well-managed.

## 2024-09-17 NOTE — ASSESSMENT & PLAN NOTE
Lab Results   Component Value Date    EGFR 57 09/17/2024    EGFR 54 09/16/2024    EGFR 44 09/15/2024    CREATININE 1.32 (H) 09/17/2024    CREATININE 1.38 (H) 09/16/2024    CREATININE 1.63 (H) 09/15/2024   -Management per primary team

## 2024-09-17 NOTE — PLAN OF CARE
Problem: PAIN - ADULT  Goal: Verbalizes/displays adequate comfort level or baseline comfort level  Description: Interventions:  - Encourage patient to monitor pain and request assistance  - Assess pain using appropriate pain scale  - Administer analgesics based on type and severity of pain and evaluate response  - Implement non-pharmacological measures as appropriate and evaluate response  - Consider cultural and social influences on pain and pain management  - Notify physician/advanced practitioner if interventions unsuccessful or patient reports new pain  Outcome: Progressing     Problem: INFECTION - ADULT  Goal: Absence or prevention of progression during hospitalization  Description: INTERVENTIONS:  - Assess and monitor for signs and symptoms of infection  - Monitor lab/diagnostic results  - Monitor all insertion sites, i.e. indwelling lines, tubes, and drains  - Monitor endotracheal if appropriate and nasal secretions for changes in amount and color  - [place] appropriate cooling/warming therapies per order  - Administer medications as ordered  - Instruct and encourage patient and family to use good hand hygiene technique  - Identify and instruct in appropriate isolation precautions for identified infection/condition  Outcome: Progressing     Problem: SAFETY ADULT  Goal: Patient will remain free of falls  Description: INTERVENTIONS:  - Educate patient/family on patient safety including physical limitations  - Instruct patient to call for assistance with activity   - Consult OT/PT to assist with strengthening/mobility   - Keep Call bell within reach  - Keep bed low and locked with side rails adjusted as appropriate  - Keep care items and personal belongings within reach  - Initiate and maintain comfort rounds  - Make Fall Risk Sign visible to staff  - Apply yellow socks and bracelet for high fall risk patients  - Consider moving patient to room near nurses station  Outcome: Progressing  Goal: Maintain or  return to baseline ADL function  Description: INTERVENTIONS:  -  Assess patient's ability to carry out ADLs; assess patient's baseline for ADL function and identify physical deficits which impact ability to perform ADLs (bathing, care of mouth/teeth, toileting, grooming, dressing, etc.)  - Assess/evaluate cause of self-care deficits   - Assess range of motion  - Assess patient's mobility; develop plan if impaired  - Assess patient's need for assistive devices and provide as appropriate  - Encourage maximum independence but intervene and supervise when necessary  - Involve family in performance of ADLs  - Assess for home care needs following discharge   - Consider OT consult to assist with ADL evaluation and planning for discharge  - Provide patient education as appropriate  Outcome: Progressing     Problem: DISCHARGE PLANNING  Goal: Discharge to home or other facility with appropriate resources  Description: INTERVENTIONS:  - Identify barriers to discharge w/patient and caregiver  - Arrange for needed discharge resources and transportation as appropriate  - Identify discharge learning needs (meds, wound care, etc.)  - Arrange for interpretive services to assist at discharge as needed  - Refer to Case Management Department for coordinating discharge planning if the patient needs post-hospital services based on physician/advanced practitioner order or complex needs related to functional status, cognitive ability, or social support system  Outcome: Progressing     Problem: Knowledge Deficit  Goal: Patient/family/caregiver demonstrates understanding of disease process, treatment plan, medications, and discharge instructions  Description: Complete learning assessment and assess knowledge base.  Interventions:  - Provide teaching at level of understanding  - Provide teaching via preferred learning methods  Outcome: Progressing     Problem: CARDIOVASCULAR - ADULT  Goal: Maintains optimal cardiac output and hemodynamic  stability  Description: INTERVENTIONS:  - Monitor I/O, vital signs and rhythm  - Monitor for S/S and trends of decreased cardiac output  - Administer and titrate ordered vasoactive medications to optimize hemodynamic stability  - Assess quality of pulses, skin color and temperature  - Assess for signs of decreased coronary artery perfusion  - Instruct patient to report change in severity of symptoms  Outcome: Progressing     Problem: SKIN/TISSUE INTEGRITY - ADULT  Goal: Incision(s), wounds(s) or drain site(s) healing without S/S of infection  Description: INTERVENTIONS  - Assess and document dressing, incision, wound bed, drain sites and surrounding tissue  - Provide patient and family education  - Perform skin care/dressing changes   Outcome: Progressing

## 2024-09-17 NOTE — PROGRESS NOTES
Progress Note - Orthopedics   Name: Yoni Castillo 59 y.o. male I MRN: [id number]  Unit/Bed#: E5 -01 I Date of Admission: 9/11/2024   Date of Service: 9/17/2024 I Hospital Day: 5    Assessment & Plan  Cellulitis of hand, right  after dog bite, volar and dorsal  Erythema and swelling still more isolated and swollen in the dorsal wrist today with similar or slightly worsened pain to touch today.  It is slightly improved motion of his wrist with flexion but he has significant pain with attempted wrist extension actively.    His lack of continued given some improvement after bedside I&D today we discussed going to the operating room for a more extensive irrigation and debridement.  He agreed to this plan.  Risks of surgery include but are not limited to infection, bleeding, wound healing complications, damage to surrounding structures, need for another surgery, anesthetic complications.  He understands these risks and wishes to proceed.  Plan will be for to OR this evening  Recommend to continue IV Abx per primary team at this time. Patient will be re-evaluated in the a.m. for possible OR  N.p.o.   Hold warm soapy soaks to the  Continue Strict elevation to the RUE and ice to the area  Pain control PRN  Medical management per primary   Ortho will continue to follow     Dog bite  -See above management  Essential hypertension  -Management per primary team    Type 2 diabetes mellitus with chronic kidney disease, without long-term current use of insulin ([facility])  Lab Results   Component Value Date    HGBA1C 8.0 (H) 06/12/2024       Recent Labs     09/16/24  1105 09/16/24  1548 09/16/24  2108 09/17/24  0727   POCGLU 167* 195* 158* 106       Blood Sugar Average: Last 72 hrs:  (P) [id number]    -Management per primary team encourage keeping blood glucose levels less than 180 to help control this infection.  Currently well-managed.  Neuropathy  -Management per primary team  Chronic kidney disease, stage 3 ([facility])  Lab  Results   Component Value Date    EGFR 57 09/17/2024    EGFR 54 09/16/2024    EGFR 44 09/15/2024    CREATININE 1.32 (H) 09/17/2024    CREATININE 1.38 (H) 09/16/2024    CREATININE 1.63 (H) 09/15/2024   -Management per primary team  Staghorn calculus          History of Present Illness   59 y.o.male no acute events but still having a lot of pain.  Now 2 days status post his bedside I&D.  Having more trouble doing the warm soapy soaks due to attempted wrist extension.  Denies fevers or chills.    Objective      Temp:  [97.8 °F (36.6 °C)-98.2 °F (36.8 °C)] 97.8 °F (36.6 °C)  HR:  [69-72] 69  Resp:  [18] 18  BP: (153-165)/(82-95) 153/82  O2 Device: None (Room air)          No intake/output data recorded.  Lines/Drains/Airways       Active Status       None                  Physical Exam   RUE:  Bandage clean and dry, removed  There is swelling distal and proximal to the Ace wrap with improved swelling at the area underneath the Ace wrap.    Slightly improved erythema over the dorsal wrist and hand  Still an isolated area of firm indurated swelling over the dorsal wrist at the area of the I&D site.  Just as tender at this area today and this is still the area of maximal tenderness  No palpable fluctuance.  No expressible fluid.  Wrist range of motion approximate 5 degrees extension , (significant pain with active extension), 30 reflection without significant discomfort able to make approximately 25% composite fist.  No pain with passive stretch of the fingers.  Sensation tact light touch in the radial, ulnar, median nerve distributions  Warm well-perfused fingers      Lab Results: I have reviewed the following results:   Recent Labs     09/15/24  0602 09/16/24  0500 09/17/24  0447   WBC 5.15 4.91 4.15*   HGB 11.2* 10.8* 10.8*   HCT 33.5* 31.9* 32.8*    195 189   BUN 33* 30* 25   CREATININE 1.63* 1.38* 1.32*     Blood Culture:    Lab Results   Component Value Date    BLOODCX No Growth After 5 Days. 11/28/2023      Wound Culture:   Lab Results   Component Value Date    WOUNDCULT 2+ Growth of 04/26/2024

## 2024-09-17 NOTE — ASSESSMENT & PLAN NOTE
Continue carvedilol  Hold lisinopril/hydrochlorothiazide, can likely resume tomorrow  BP currently controlled

## 2024-09-17 NOTE — ASSESSMENT & PLAN NOTE
Dog bite of right hand with swelling and edema  Evaluated by orthopedics who recommends warm soapy baths 3 times daily.  No surgical intervention at this time.  Patient reports typically needing prolonged course of antibiotics  S/p bedside I&D by ortho 09/15  Continues to have swelling, pain that is unchanged  N.p.o. planning for incision and drainage today with Ortho

## 2024-09-17 NOTE — PROGRESS NOTES
Progress Note - Hospitalist   Name: Yoni Castillo 59 y.o. male I MRN: [id number]  Unit/Bed#: E5 -01 I Date of Admission: 2024   Date of Service: 2024 I Hospital Day: 5     Assessment & Plan  Essential hypertension  Continue carvedilol  Hold lisinopril/hydrochlorothiazide, can likely resume tomorrow  BP currently controlled  Type 2 diabetes mellitus with chronic kidney disease, without long-term current use of insulin (HCC)  Refuses sliding scale insulin  Hold Jardiance with elevated creatinine  Continue diabetic diet    Neuropathy  Patient currently on several vitamins including vitamin C, garlic capsule, vitamin EE for his peripheral neuropathy.    Chronic kidney disease, stage 3 (HCC)  Renal functions currently stable  Dog bite  Managed as above     Cellulitis of hand, right  Dog bite of right hand with swelling and edema  Evaluated by orthopedics who recommends warm soapy baths 3 times daily.  No surgical intervention at this time.  Patient reports typically needing prolonged course of antibiotics  S/p bedside I&D by ortho 09/15  Continues to have swelling, pain that is unchanged  N.p.o. planning for incision and drainage today with Ortho  Staghorn calculus  Diagnosed on previous ED  Urology consulted, recommend outpatient follow up on 10/10 to further discuss    VTE Pharmacologic Prophylaxis:   Pharmacologic: Enoxaparin (Lovenox)  Mechanical VTE Prophylaxis in Place: Yes    Current Length of Stay: 5 day(s)    Current Patient Status: Inpatient   Certification Statement: The patient will continue to require additional inpatient hospital stay due to IV abx, pending ortho surgery today    Discharge Plan: pending    Code Status: Level 1 - Full Code      Subjective:   Patient was seen while ambulating throughout the [place].  Continues to have some pain and discomfort involving his right hand.  Denies any fevers or chills.  Pain currently controlled.    Objective:     Vitals:   Temp (24hrs), Av.1 °F  (36.7 °C), Min:97.8 °F (36.6 °C), Max:98.2 °F (36.8 °C)    Temp:  [97.8 °F (36.6 °C)-98.2 °F (36.8 °C)] 97.8 °F (36.6 °C)  HR:  [69-72] 69  Resp:  [18] 18  BP: (153-165)/(82-95) 153/82  SpO2:  [93 %-96 %] 93 %  Body mass index is 37.37 kg/m².     Input and Output Summary (last 24 hours):     No intake or output data in the 24 hours ending 09/17/24 1203    Physical Exam:     Physical Exam  Vitals and nursing note reviewed.   Constitutional:       Appearance: Normal appearance. He is obese.   HENT:      Head: Normocephalic and atraumatic.   Eyes:      Conjunctiva/sclera: Conjunctivae normal.   Cardiovascular:      Rate and Rhythm: Normal rate.   Pulmonary:      Effort: Pulmonary effort is normal.      Breath sounds: No wheezing.   Abdominal:      General: Bowel sounds are normal. There is no distension.      Palpations: Abdomen is soft.   Musculoskeletal:         General: No swelling. Normal range of motion.   Skin:     General: Skin is warm and dry.      Comments: Right hand swelling, wrapped in dressing   Neurological:      General: No focal deficit present.      Mental Status: He is alert. Mental status is at baseline.         Additional Data:     Labs:    Results from last 7 days   Lab Units 09/17/24 0447 09/12/24  0851 09/12/24  0615   WBC Thousand/uL 4.15*   < > 5.76   HEMOGLOBIN g/dL 10.8*   < > 9.9*   HEMATOCRIT % 32.8*   < > 28.5*   PLATELETS Thousands/uL 189   < > 156   SEGS PCT %  --   --  71   LYMPHO PCT %  --   --  14   MONO PCT %  --   --  13*   EOS PCT %  --   --  2    < > = values in this interval not displayed.     Results from last 7 days   Lab Units 09/17/24  0447 09/13/24  0608 09/12/24  0851   SODIUM mmol/L 135   < > 136   POTASSIUM mmol/L 4.0   < > 4.3   CHLORIDE mmol/L 104   < > 105   CO2 mmol/L 24   < > 26   BUN mg/dL 25   < > 23   CREATININE mg/dL 1.32*   < > 1.38*   ANION GAP mmol/L 7   < > 5   CALCIUM mg/dL 8.4   < > 8.8   ALBUMIN g/dL  --   --  3.9   TOTAL BILIRUBIN mg/dL  --   --  0.69    ALK PHOS U/L  --   --  33*   ALT U/L  --   --  14   AST U/L  --   --  13   GLUCOSE RANDOM mg/dL 124   < > 130    < > = values in this interval not displayed.         Results from last 7 days   Lab Units 09/17/24  1148 09/17/24  0727 09/16/24  2108 09/16/24  1548 09/16/24  1105 09/16/24  0724 09/15/24  2100 09/15/24  1554 09/15/24  1106 09/15/24  0736 09/14/24  2142 09/14/24  1556   POC GLUCOSE mg/dl 108 106 158* 195* 167* 156* 170* 160* 163* 130 156* 118         Results from last 7 days   Lab Units 09/11/24  1742   LACTIC ACID mmol/L 1.0           * I Have Reviewed All Lab Data Listed Above.  * Additional Pertinent Lab Tests Reviewed: All Labs For Current Hospital Admission Reviewed    Mobility:  Basic Mobility Inpatient Raw Score: 24  -[facility] Goal: 8: Walk 250 feet or more  -HL Achieved: 8: Walk 250 feet ot more    Lines:     Invasive Devices       Peripheral Intravenous Line  Duration             Peripheral IV 09/15/24 Left;Ventral (anterior) Forearm 2 days                       Imaging:    Imaging Reports Reviewed Today Include:     XR hand 3+ views RIGHT    Result Date: 9/11/2024  Impression: No acute osseous abnormality. Computerized Assisted Algorithm (CAA) may have been used to analyze all applicable images. Workstation performed: [id number]     CT upper extremity w contrast right    Result Date: 9/11/2024  Impression: Mild swelling of the volar wrist in keeping with provided history of dog bite. No fluid collection. No soft tissue gas. Workstation performed: [id number]        Recent Cultures (last 7 days):     Results from last 7 days   Lab Units 09/15/24  1117   GRAM STAIN RESULT  No Polys or Bacteria seen       Last 24 Hours Medication List:   Current Facility-Administered Medications   Medication Dose Route Frequency Provider Last Rate    acetaminophen  975 mg Oral Q6H WILTON Jerez DO      aluminum-magnesium hydroxide-simethicone  30 mL Oral Q4H PRN Sean Morgan DO       ampicillin-sulbactam  3 g Intravenous Q6H Baldev Jerez DO 3 g (09/17/24 1118)    vitamin C  1,000 mg Oral BID Baldev Jerez DO      carvedilol  25 mg Oral BID With Meals Baldev Jerez DO      enoxaparin  40 mg Subcutaneous Daily Baldev Jerez DO      lidocaine  1 patch Topical Daily Sean Morgan DO      morphine injection  4 mg Intravenous Q6H PRN Vanessa Roger MD      multi-electrolyte  100 mL/hr Intravenous Continuous Sean Morgan  mL/hr (09/17/24 0515)    ondansetron  4 mg Intravenous Q4H PRN Sean Morgan DO      oxyCODONE  5 mg Oral Q4H PRN Vanessa Roger MD      senna  1 tablet Oral BID Sean Morgan DO      vitamin E (tocopherol)  400 Units Oral BID Baldev Jerez DO          Today, Patient Was Seen By: Ham Sheehan MD    ** Please Note: Dictation voice to text software may have been used in the creation of this document. **

## 2024-09-18 PROBLEM — M65.9 TENOSYNOVITIS OF WRIST FLEXOR: Status: ACTIVE | Noted: 2024-09-18

## 2024-09-18 PROBLEM — E66.9 OBESITY (BMI 30-39.9): Status: ACTIVE | Noted: 2024-09-18

## 2024-09-18 PROBLEM — M65.939 TENOSYNOVITIS OF WRIST FLEXOR: Status: ACTIVE | Noted: 2024-09-18

## 2024-09-18 PROBLEM — M65.90 TENOSYNOVITIS: Status: ACTIVE | Noted: 2024-09-18

## 2024-09-18 LAB
ANION GAP SERPL CALCULATED.3IONS-SCNC: 8 MMOL/L (ref 4–13)
BACTERIA TISS AEROBE CULT: ABNORMAL
BUN SERPL-MCNC: 20 MG/DL (ref 5–25)
CALCIUM SERPL-MCNC: 8.2 MG/DL (ref 8.4–10.2)
CHLORIDE SERPL-SCNC: 104 MMOL/L (ref 96–108)
CO2 SERPL-SCNC: 25 MMOL/L (ref 21–32)
CREAT SERPL-MCNC: 1.12 MG/DL (ref 0.6–1.3)
ERYTHROCYTE [DISTWIDTH] IN BLOOD BY AUTOMATED COUNT: 13.8 % (ref 11.6–15.1)
GFR SERPL CREATININE-BSD FRML MDRD: 70 ML/MIN/1.73SQ M
GLUCOSE SERPL-MCNC: 129 MG/DL (ref 65–140)
GLUCOSE SERPL-MCNC: 144 MG/DL (ref 65–140)
GLUCOSE SERPL-MCNC: 174 MG/DL (ref 65–140)
GLUCOSE SERPL-MCNC: 179 MG/DL (ref 65–140)
GLUCOSE SERPL-MCNC: 191 MG/DL (ref 65–140)
GRAM STN SPEC: ABNORMAL
HCT VFR BLD AUTO: 35.2 % (ref 36.5–49.3)
HGB BLD-MCNC: 11.7 G/DL (ref 12–17)
MCH RBC QN AUTO: 31.8 PG (ref 26.8–34.3)
MCHC RBC AUTO-ENTMCNC: 33.2 G/DL (ref 31.4–37.4)
MCV RBC AUTO: 96 FL (ref 82–98)
PLATELET # BLD AUTO: 225 THOUSANDS/UL (ref 149–390)
PMV BLD AUTO: 9.5 FL (ref 8.9–12.7)
POTASSIUM SERPL-SCNC: 4.3 MMOL/L (ref 3.5–5.3)
RBC # BLD AUTO: 3.68 MILLION/UL (ref 3.88–5.62)
SODIUM SERPL-SCNC: 137 MMOL/L (ref 135–147)
WBC # BLD AUTO: 6.79 THOUSAND/UL (ref 4.31–10.16)

## 2024-09-18 PROCEDURE — 99024 POSTOP FOLLOW-UP VISIT: CPT

## 2024-09-18 PROCEDURE — 99232 SBSQ HOSP IP/OBS MODERATE 35: CPT | Performed by: STUDENT IN AN ORGANIZED HEALTH CARE EDUCATION/TRAINING PROGRAM

## 2024-09-18 PROCEDURE — 82948 REAGENT STRIP/BLOOD GLUCOSE: CPT

## 2024-09-18 PROCEDURE — 99222 1ST HOSP IP/OBS MODERATE 55: CPT | Performed by: INTERNAL MEDICINE

## 2024-09-18 PROCEDURE — 80048 BASIC METABOLIC PNL TOTAL CA: CPT

## 2024-09-18 PROCEDURE — 85027 COMPLETE CBC AUTOMATED: CPT

## 2024-09-18 RX ORDER — LISINOPRIL 20 MG/1
20 TABLET ORAL 2 TIMES DAILY
Status: DISCONTINUED | OUTPATIENT
Start: 2024-09-18 | End: 2024-09-21 | Stop reason: HOSPADM

## 2024-09-18 RX ORDER — HYDROCHLOROTHIAZIDE 12.5 MG/1
12.5 TABLET ORAL 2 TIMES DAILY
Status: DISCONTINUED | OUTPATIENT
Start: 2024-09-18 | End: 2024-09-21 | Stop reason: HOSPADM

## 2024-09-18 RX ORDER — OXYCODONE HYDROCHLORIDE 10 MG/1
10 TABLET ORAL EVERY 8 HOURS PRN
Qty: 20 TABLET | Refills: 0 | Status: CANCELLED | OUTPATIENT
Start: 2024-09-18

## 2024-09-18 RX ADMIN — Medication 400 UNITS: at 17:51

## 2024-09-18 RX ADMIN — AMPICILLIN SODIUM AND SULBACTAM SODIUM 3 G: 2; 1 INJECTION, POWDER, FOR SOLUTION INTRAMUSCULAR; INTRAVENOUS at 11:35

## 2024-09-18 RX ADMIN — MORPHINE SULFATE 4 MG: 4 INJECTION INTRAVENOUS at 13:58

## 2024-09-18 RX ADMIN — OXYCODONE HYDROCHLORIDE AND ACETAMINOPHEN 1000 MG: 500 TABLET ORAL at 21:07

## 2024-09-18 RX ADMIN — ACETAMINOPHEN 975 MG: 325 TABLET ORAL at 23:46

## 2024-09-18 RX ADMIN — AMPICILLIN SODIUM AND SULBACTAM SODIUM 3 G: 2; 1 INJECTION, POWDER, FOR SOLUTION INTRAMUSCULAR; INTRAVENOUS at 23:46

## 2024-09-18 RX ADMIN — OXYCODONE HYDROCHLORIDE AND ACETAMINOPHEN 1000 MG: 500 TABLET ORAL at 08:25

## 2024-09-18 RX ADMIN — Medication 400 UNITS: at 08:26

## 2024-09-18 RX ADMIN — MORPHINE SULFATE 4 MG: 4 INJECTION INTRAVENOUS at 04:27

## 2024-09-18 RX ADMIN — LISINOPRIL 20 MG: 20 TABLET ORAL at 17:53

## 2024-09-18 RX ADMIN — OXYCODONE 5 MG: 5 TABLET ORAL at 01:17

## 2024-09-18 RX ADMIN — OXYCODONE 5 MG: 5 TABLET ORAL at 17:59

## 2024-09-18 RX ADMIN — OXYCODONE 5 MG: 5 TABLET ORAL at 11:39

## 2024-09-18 RX ADMIN — ACETAMINOPHEN 975 MG: 325 TABLET ORAL at 17:52

## 2024-09-18 RX ADMIN — HYDROCHLOROTHIAZIDE 12.5 MG: 12.5 TABLET ORAL at 17:53

## 2024-09-18 RX ADMIN — MORPHINE SULFATE 4 MG: 4 INJECTION INTRAVENOUS at 23:48

## 2024-09-18 RX ADMIN — AMPICILLIN SODIUM AND SULBACTAM SODIUM 3 G: 2; 1 INJECTION, POWDER, FOR SOLUTION INTRAMUSCULAR; INTRAVENOUS at 06:09

## 2024-09-18 RX ADMIN — CARVEDILOL 25 MG: 25 TABLET, FILM COATED ORAL at 15:53

## 2024-09-18 RX ADMIN — ACETAMINOPHEN 975 MG: 325 TABLET ORAL at 11:35

## 2024-09-18 RX ADMIN — AMPICILLIN SODIUM AND SULBACTAM SODIUM 3 G: 2; 1 INJECTION, POWDER, FOR SOLUTION INTRAMUSCULAR; INTRAVENOUS at 17:54

## 2024-09-18 RX ADMIN — ACETAMINOPHEN 975 MG: 325 TABLET ORAL at 04:26

## 2024-09-18 RX ADMIN — LIDOCAINE 5% 1 PATCH: 700 PATCH TOPICAL at 08:24

## 2024-09-18 RX ADMIN — CARVEDILOL 25 MG: 25 TABLET, FILM COATED ORAL at 08:25

## 2024-09-18 RX ADMIN — AMPICILLIN SODIUM AND SULBACTAM SODIUM 3 G: 2; 1 INJECTION, POWDER, FOR SOLUTION INTRAMUSCULAR; INTRAVENOUS at 00:17

## 2024-09-18 NOTE — ASSESSMENT & PLAN NOTE
Dog bite of right hand with swelling and edema  S/p bedside I&D by ortho 09/15  Without significant improvement, underwent I&D in OR on 09/18  Wound cultures from I&D on 09/15 growing staph coag negative, will ask ID for input  Continue IV antibiotics with Unasyn

## 2024-09-18 NOTE — PROGRESS NOTES
Progress Note - Hospitalist   Name: Yoni Castillo 59 y.o. male I MRN: [id number]  Unit/Bed#: E5 -01 I Date of Admission: 2024   Date of Service: 2024 I Hospital Day: 6     Assessment & Plan  Essential hypertension  Continue carvedilol lisinopril and HCTZ  BP currently controlled  Type 2 diabetes mellitus with chronic kidney disease, without long-term current use of insulin (HCC)  Refuses sliding scale insulin  Hold Jardiance with elevated creatinine  Continue diabetic diet    Neuropathy  Patient currently on several vitamins including vitamin C, garlic capsule, vitamin EE for his peripheral neuropathy.    Chronic kidney disease, stage 3 (HCC)  Renal functions currently stable  Dog bite  Managed as above     Cellulitis of hand, right  Dog bite of right hand with swelling and edema  S/p bedside I&D by ortho 09/15  Without significant improvement, underwent I&D in OR on   Wound cultures from I&D on 09/15 growing staph coag negative, will ask ID for input  Continue IV antibiotics with Unasyn  Staghorn calculus  Diagnosed on previous ED  Urology consulted, recommend outpatient follow up on 10/10 to further discuss    VTE Pharmacologic Prophylaxis:   Pharmacologic: Enoxaparin (Lovenox)  Mechanical VTE Prophylaxis in Place: Yes    Current Length of Stay: 6 day(s)    Current Patient Status: Inpatient   Certification Statement: The patient will continue to require additional inpatient hospital stay due to IV abx, pending ID evaluation and Ortho clearance    Discharge Plan: pending, 24-48 hours    Code Status: Level 1 - Full Code      Subjective:   Patient currently doing well.  Denies any fevers or chills.  Pain currently controlled.    Objective:     Vitals:   Temp (24hrs), Av.7 °F (36.5 °C), Min:96.8 °F (36 °C), Max:98.7 °F (37.1 °C)    Temp:  [96.8 °F (36 °C)-98.7 °F (37.1 °C)] 98.7 °F (37.1 °C)  HR:  [64-81] 80  Resp:  [12-18] 18  BP: (125-165)/() 137/82  SpO2:  [89 %-96 %] 91 %  Body mass  index is 37.37 kg/m².     Input and Output Summary (last 24 hours):       Intake/Output Summary (Last 24 hours) at 9/18/2024 1408  Last data filed at 9/17/2024 2017  Gross per 24 hour   Intake 950 ml   Output --   Net 950 ml       Physical Exam:     Physical Exam  Vitals and nursing note reviewed.   Constitutional:       Appearance: Normal appearance. He is obese.   HENT:      Head: Normocephalic and atraumatic.   Eyes:      Conjunctiva/sclera: Conjunctivae normal.   Cardiovascular:      Rate and Rhythm: Normal rate.   Pulmonary:      Effort: Pulmonary effort is normal.      Breath sounds: No wheezing.   Abdominal:      General: Bowel sounds are normal. There is no distension.      Palpations: Abdomen is soft.   Musculoskeletal:         General: No swelling. Normal range of motion.   Skin:     General: Skin is warm and dry.      Comments: Right hand swelling, wrapped in dressing   Neurological:      Mental Status: He is alert. Mental status is at baseline.       Additional Data:     Labs:    Results from last 7 days   Lab Units 09/18/24  0438 09/12/24  0851 09/12/24  0615   WBC Thousand/uL 6.79   < > 5.76   HEMOGLOBIN g/dL 11.7*   < > 9.9*   HEMATOCRIT % 35.2*   < > 28.5*   PLATELETS Thousands/uL 225   < > 156   SEGS PCT %  --   --  71   LYMPHO PCT %  --   --  14   MONO PCT %  --   --  13*   EOS PCT %  --   --  2    < > = values in this interval not displayed.     Results from last 7 days   Lab Units 09/18/24  0438 09/13/24  0608 09/12/24  0851   SODIUM mmol/L 137   < > 136   POTASSIUM mmol/L 4.3   < > 4.3   CHLORIDE mmol/L 104   < > 105   CO2 mmol/L 25   < > 26   BUN mg/dL 20   < > 23   CREATININE mg/dL 1.12   < > 1.38*   ANION GAP mmol/L 8   < > 5   CALCIUM mg/dL 8.2*   < > 8.8   ALBUMIN g/dL  --   --  3.9   TOTAL BILIRUBIN mg/dL  --   --  0.69   ALK PHOS U/L  --   --  33*   ALT U/L  --   --  14   AST U/L  --   --  13   GLUCOSE RANDOM mg/dL 144*   < > 130    < > = values in this interval not displayed.          Results from last 7 days   Lab Units 09/18/24  1106 09/18/24  0742 09/17/24  2031 09/17/24  1547 09/17/24  1148 09/17/24  0727 09/16/24  2108 09/16/24  1548 09/16/24  1105 09/16/24  0724 09/15/24  2100 09/15/24  1554   POC GLUCOSE mg/dl 191* 174* 96 110 108 106 158* 195* 167* 156* 170* 160*         Results from last 7 days   Lab Units 09/11/24  1742   LACTIC ACID mmol/L 1.0           * I Have Reviewed All Lab Data Listed Above.  * Additional Pertinent Lab Tests Reviewed: All Labs For Current Hospital Admission Reviewed    Mobility:  Basic Mobility Inpatient Raw Score: 24  -HLM Goal: 8: Walk 250 feet or more  JH-HLM Achieved: 8: Walk 250 feet ot more    Lines:     Invasive Devices       Peripheral Intravenous Line  Duration             Peripheral IV 09/15/24 Left;Ventral (anterior) Forearm 3 days                       Imaging:    Imaging Reports Reviewed Today Include:     XR hand 3+ views RIGHT    Result Date: 9/11/2024  Impression: No acute osseous abnormality. Computerized Assisted Algorithm (CAA) may have been used to analyze all applicable images. Workstation performed: [id number]     CT upper extremity w contrast right    Result Date: 9/11/2024  Impression: Mild swelling of the volar wrist in keeping with provided history of dog bite. No fluid collection. No soft tissue gas. Workstation performed: [id number]        Recent Cultures (last 7 days):     Results from last 7 days   Lab Units 09/15/24  1117   GRAM STAIN RESULT  No Polys or Bacteria seen       Last 24 Hours Medication List:   Current Facility-Administered Medications   Medication Dose Route Frequency Provider Last Rate    acetaminophen  975 mg Oral Q6H WILTON RONNY Blanchard-KAREN      aluminum-magnesium hydroxide-simethicone  30 mL Oral Q4H PRN RONNY Blanchard-KAREN      ampicillin-sulbactam  3 g Intravenous Q6H RONNY Blanchard-KAREN 3 g (09/18/24 1135)    vitamin C  1,000 mg Oral BID Yesenia Carrasquillo PA-C      carvedilol  25 mg Oral BID With Meals Yesenia  RONNY Carrasquillo-C      enoxaparin  40 mg Subcutaneous Daily Yesenia Carrasquillo PA-C      hydroCHLOROthiazide  12.5 mg Oral BID Ham Sheehan MD      lidocaine  1 patch Topical Daily Yesenia Carrasquillo, PA-C      lisinopril  20 mg Oral BID Ham Sheehan MD      morphine injection  4 mg Intravenous Q6H PRN Yesenia Neida, PA-C      ondansetron  4 mg Intravenous Q4H PRN Yesenia Olegabrielr, PA-C      oxyCODONE  5 mg Oral Q4H PRN Yesenia Olegabrielr, PA-C      senna  1 tablet Oral BID YeseniaRONNY Lopez-C      vitamin E (tocopherol)  400 Units Oral BID RONNY Blanchard-KAREN          Today, Patient Was Seen By: Ham Sheehan MD    ** Please Note: Dictation voice to text software may have been used in the creation of this document. **

## 2024-09-18 NOTE — PROGRESS NOTES
Progress Note - Orthopedics   Name: Yoni Castillo 59 y.o. male I MRN: [id number]  Unit/Bed#: E5 -01 I Date of Admission: 9/11/2024   Date of Service: 9/18/2024 I Hospital Day: 6     Assessment & Plan  Cellulitis of hand, right  POD 1 s/p right hand and wrist I&D with extensor compartment tenosynovectomy with Dr. Mccarthy on 9/17/24.    Plan  Splint removed today.  Resume warm soapy soaks to the hand and then re-wrap with xeroform, gauze, and ACE bandages.    Partial WB RUE (<5 lbs)  Recommend to continue IV Abx per primary team at this time.   IO cultures pending  Continue Strict elevation to the RUE and ice to the area  Pain control PRN  Medical management per primary   Ortho will continue to follow   F/u with Shari in 1 week.   Dog bite  -See above management  Essential hypertension  -Management per primary team    Type 2 diabetes mellitus with chronic kidney disease, without long-term current use of insulin ([facility])  Lab Results   Component Value Date    HGBA1C 8.0 (H) 06/12/2024       Recent Labs     09/17/24  1547 09/17/24  2031 09/18/24  0742 09/18/24  1106   POCGLU 110 96 174* 191*       Blood Sugar Average: Last 72 hrs:  (P) [id number]    -Management per primary team encourage keeping blood glucose levels less than 180 to help control this infection.  Currently well-managed.  Neuropathy  -Management per primary team  Chronic kidney disease, stage 3 ([facility])  Lab Results   Component Value Date    EGFR 70 09/18/2024    EGFR 57 09/17/2024    EGFR 54 09/16/2024    CREATININE 1.12 09/18/2024    CREATININE 1.32 (H) 09/17/2024    CREATININE 1.38 (H) 09/16/2024   -Management per primary team  Staghorn calculus      Orthopedics service will follow.    History of Present Illness   59 y.o.male POD 1 s/p right hand and wrist I&D with extensor compartment tenosynovectomy.  No new acute overnight events, no new complaints.  The patient has ongoing relatively severe pain today which feels similar to before the  surgery.  He states his right long, ring, and small fingers feel numb.  He denies subjective fevers, chills, CP, SOB, or N/V.    Objective      Temp:  [96.8 °F (36 °C)-98.7 °F (37.1 °C)] 98.7 °F (37.1 °C)  HR:  [64-81] 80  Resp:  [12-18] 18  BP: (125-165)/() 137/82  O2 Device: None (Room air)          I/O         09/16 0701  09/17 0700 09/17 0701  09/18 0700 09/18 0701  09/19 0700    P.O.       I.V. (mL/kg)  950 (7.6)     Total Intake(mL/kg)  950 (7.6)     Net  +950                  Lines/Drains/Airways       Active Status       None                  Physical Exam  Vitals and nursing note reviewed.   Constitutional:       General: He is not in acute distress.     Appearance: He is well-developed.   HENT:      Head: Normocephalic and atraumatic.   Eyes:      Conjunctiva/sclera: Conjunctivae normal.   Cardiovascular:      Rate and Rhythm: Normal rate.   Pulmonary:      Effort: Pulmonary effort is normal. No respiratory distress.      Breath sounds: Normal breath sounds.   Abdominal:      Palpations: Abdomen is soft.      Tenderness: There is no abdominal tenderness.   Musculoskeletal:      Cervical back: Neck supple.   Skin:     General: Skin is warm and dry.      Capillary Refill: Capillary refill takes less than 2 seconds.   Neurological:      Mental Status: He is alert.   Psychiatric:         Mood and Affect: Mood normal.      Musculoskeletal: right upper  Dorsal hand:  Wound is well approximated.  No active drainage.  The skin surrounding the wound appears erythematous.    Volar hand:  Wound is well approximated.  No active drainage.  No significant surrounding erythema.  TTP over the entire hand  SILT R/M/U nerves.  Patient is having trouble moving all fingers due to hand pain.                Lab Results: I have reviewed the following results: CBC/BMP:   .     09/18/24  0438   WBC 6.79   HGB 11.7*   HCT 35.2*      SODIUM 137   K 4.3      CO2 25   BUN 20   CREATININE 1.12   GLUC 144*      Recent  Labs     09/16/24  0500 09/17/24  0447 09/18/24  0438   WBC 4.91 4.15* 6.79   HGB 10.8* 10.8* 11.7*   HCT 31.9* 32.8* 35.2*    189 225   BUN 30* 25 20   CREATININE 1.38* 1.32* 1.12     Blood Culture:    Lab Results   Component Value Date    BLOODCX No Growth After 5 Days. 11/28/2023     Wound Culture:   Lab Results   Component Value Date    WOUNDCULT 2+ Growth of 04/26/2024

## 2024-09-18 NOTE — ASSESSMENT & PLAN NOTE
Patient initially presented after #3 with progressing cellulitis of his hand and he failed to improve ultimately going to the OR on 9/17 with findings of tenosynovitis requiring intervention.  OR cultures so far without growth.  Peripheral culture from 9/15 only with broth growth of coagulase-negative staph.  Likely insignificant.  He has remained on Unasyn and is otherwise hemodynamically stable.  Will continue on Unasyn for now  Follow-up pending OR cultures  Continue to trend fever curve/vitals  Repeat CBC/chemistry tomorrow to monitor treatment spot/dosing  Continue to monitor surgical site  Ongoing follow-up by orthopedics  If OR cultures finalized as negative anticipate transition to Augmentin  Anticipate total 3 weeks of therapy from OR intervention for tenosynovitis  No role for rabies prophylaxis as animal is up-to-date and this was not unprovoked  No role for tetanus booster as patient reports last injection was 1 to 2 years ago  Additional supportive care as per primary  Additional interventions pending clinical course

## 2024-09-18 NOTE — CASE MANAGEMENT
Case Management Discharge Planning Note    Patient name Yoni Zazuetap  [facility] /E5 -* MRN [id number]  : 1965 Date 2024       Current Admission Date: 2024  Current Admission Diagnosis:Cellulitis of hand, right   Patient Active Problem List    Diagnosis Date Noted Date Diagnosed    Preop exam for internal medicine 2024     Mucoid cyst of joint 2024     Staghorn calculus 2024     Dog bite 2024     Cellulitis of hand, right 01/15/2024     Dupuytren's disease of finger with nodules without contracture 10/17/2023     Acquired absence of other left toe(s) ([facility]) 2023     Incisional hernia without obstruction or gangrene 2023     Ventral hernia without obstruction or gangrene 06/15/2023     Chronic kidney disease, stage 3 ([facility]) 2022     Neuropathy 2021     Low back pain with sciatica 2021     Cervical radiculopathy 2021     Class 2 severe obesity with serious comorbidity and body mass index (BMI) of 35.0 to 35.9 in adult ([facility]) 2021     Essential hypertension      Type 2 diabetes mellitus with chronic kidney disease, without long-term current use of insulin ([facility])        LOS (days): 6  Geometric Mean LOS (GMLOS) (days): 3.2  Days to GMLOS:-2.9     OBJECTIVE:  Risk of Unplanned Readmission Score: 13.74      Current admission status: Inpatient   Preferred Pharmacy:   [facility] [address],  [address] [place] [address],  [facility] [address]  Phone: [phone] Fax: [phone]    Primary Care Provider: Judd Ji MD    Primary Insurance: MEDICARE  Secondary Insurance: BLUE CROSS    DISCHARGE DETAILS:    Treatment Team Recommendation: Home  Discharge Destination Plan:: Home     IMM Given (Date):: 24  IMM Given to:: Patient  Family notified:: Reviewed IMM with pt. He understands his right.

## 2024-09-18 NOTE — ASSESSMENT & PLAN NOTE
POD 1 s/p right hand and wrist I&D with extensor compartment tenosynovectomy with Dr. Mccarthy on 9/17/24.    Plan  Splint removed today.  Resume warm soapy soaks to the hand and then re-wrap with xeroform, gauze, and ACE bandages.    Partial WB RUE (<5 lbs)  Recommend to continue IV Abx per primary team at this time.   IO cultures pending  Continue Strict elevation to the RUE and ice to the area  Pain control PRN  Medical management per primary   Ortho will continue to follow   F/u with Shari in 1 week.

## 2024-09-18 NOTE — DISCHARGE INSTR - AVS FIRST PAGE
POST-OPERATIVE INSTRUCTIONS  Right Hand I&D    You have just undergone a hand I&D by Dr. Mccarthy in the operating room.  It is our wish that your postoperative recovery be as quick and comfortable as possible.  Please carefully review the following items that are important in your recovery.    Pain Control:  After any operation, a certain degree of pain is to be expected.  A prescription for pain medication has been electronically sent to your pharmacy. Do not exceed 3000 mg of Tylenol per day. This medication will relieve most of your pain but may not relieve all of the pain. Some pain is normal post operatively.     When you go home, please keep your operated arm elevated at all times (above the level of your heart.)  If you do this, your swelling will be diminished and your pain will be diminished as well.      Antibiotics:  Please take your antibiotics as prescribed. Complete your entire prescribed course.       Dressing Care:  On the first day after surgery you may remove your operative bandage and start soaks. Remove the bandage and soak the operative hand in a mixture of warm saline and soap or hydrogen peroxide for 10 minutes, 2-3 times a day. Then replace the bandage with antibiotic ointment, adaptic (or similar), gauze and an ace wrap of rolled guaze (e.g. veronica). Continue this daily until you see Dr. Mccarthy in follow up.       Weight Bearing:  You should NOT bear weight >5lbs through your operative extremity. Do not push off using your operative extremity.     It is ok to move your fingers as tolerated to prevent stiffness. You may also use your operative hand to assist with light activities of daily living such as putting on clothes, brushing your teeth and eating as tolerated      Follow Up:  If you don't already have a scheduled postoperative appointment, please call our office for a follow-up appointment. It is best to call the day after surgery to make an appointment in 7 days.  At your first  postoperative visit, you will be seen by either Dr. Mccarthy, his PA; or one of their associates. The sutures will be removed and you may be asked to see a hand therapist to optimize your functional result. Each of the hand therapists that you will be referred to have received special training in the care of the hand and upper extremity.      When to Call:  It is normal to see minor staining on the hand surgery dressing after surgery. If there is significant bleeding, you are advised to call the office during regular office hours to have this checked.     If you feel that you have a surgical emergency postoperatively that requires immediate attention, please call [phone]. In addition, any emergency can also be handled by the emergency room.      If you have questions regarding your surgery postop that you feel requires attention, please call the office.   If this occurs after our regular office hours, there is a message with instructions to talk to the physician on call.

## 2024-09-18 NOTE — PLAN OF CARE
Problem: PAIN - ADULT  Goal: Verbalizes/displays adequate comfort level or baseline comfort level  Description: Interventions:  - Encourage patient to monitor pain and request assistance  - Assess pain using appropriate pain scale  - Administer analgesics based on type and severity of pain and evaluate response  - Implement non-pharmacological measures as appropriate and evaluate response  - Consider cultural and social influences on pain and pain management  - Notify physician/advanced practitioner if interventions unsuccessful or patient reports new pain  Outcome: Progressing     Problem: INFECTION - ADULT  Goal: Absence or prevention of progression during hospitalization  Description: INTERVENTIONS:  - Assess and monitor for signs and symptoms of infection  - Monitor lab/diagnostic results  - Monitor all insertion sites, i.e. indwelling lines, tubes, and drains  - Monitor endotracheal if appropriate and nasal secretions for changes in amount and color  - [place] appropriate cooling/warming therapies per order  - Administer medications as ordered  - Instruct and encourage patient and family to use good hand hygiene technique  - Identify and instruct in appropriate isolation precautions for identified infection/condition  Outcome: Progressing     Problem: SAFETY ADULT  Goal: Patient will remain free of falls  Description: INTERVENTIONS:  - Educate patient/family on patient safety including physical limitations  - Instruct patient to call for assistance with activity   - Consult OT/PT to assist with strengthening/mobility   - Keep Call bell within reach  - Keep bed low and locked with side rails adjusted as appropriate  - Keep care items and personal belongings within reach  - Initiate and maintain comfort rounds  - Make Fall Risk Sign visible to staff  - Apply yellow socks and bracelet for high fall risk patients  - Consider moving patient to room near nurses station  Outcome: Progressing  Goal: Maintain or  return to baseline ADL function  Description: INTERVENTIONS:  -  Assess patient's ability to carry out ADLs; assess patient's baseline for ADL function and identify physical deficits which impact ability to perform ADLs (bathing, care of mouth/teeth, toileting, grooming, dressing, etc.)  - Assess/evaluate cause of self-care deficits   - Assess range of motion  - Assess patient's mobility; develop plan if impaired  - Assess patient's need for assistive devices and provide as appropriate  - Encourage maximum independence but intervene and supervise when necessary  - Involve family in performance of ADLs  - Assess for home care needs following discharge   - Consider OT consult to assist with ADL evaluation and planning for discharge  - Provide patient education as appropriate  Outcome: Progressing     Problem: DISCHARGE PLANNING  Goal: Discharge to home or other facility with appropriate resources  Description: INTERVENTIONS:  - Identify barriers to discharge w/patient and caregiver  - Arrange for needed discharge resources and transportation as appropriate  - Identify discharge learning needs (meds, wound care, etc.)  - Arrange for interpretive services to assist at discharge as needed  - Refer to Case Management Department for coordinating discharge planning if the patient needs post-hospital services based on physician/advanced practitioner order or complex needs related to functional status, cognitive ability, or social support system  Outcome: Progressing     Problem: Knowledge Deficit  Goal: Patient/family/caregiver demonstrates understanding of disease process, treatment plan, medications, and discharge instructions  Description: Complete learning assessment and assess knowledge base.  Interventions:  - Provide teaching at level of understanding  - Provide teaching via preferred learning methods  Outcome: Progressing     Problem: CARDIOVASCULAR - ADULT  Goal: Maintains optimal cardiac output and hemodynamic  stability  Description: INTERVENTIONS:  - Monitor I/O, vital signs and rhythm  - Monitor for S/S and trends of decreased cardiac output  - Administer and titrate ordered vasoactive medications to optimize hemodynamic stability  - Assess quality of pulses, skin color and temperature  - Assess for signs of decreased coronary artery perfusion  - Instruct patient to report change in severity of symptoms  Outcome: Progressing     Problem: RESPIRATORY - ADULT  Goal: Achieves optimal ventilation and oxygenation  Description: INTERVENTIONS:  - Assess for changes in respiratory status  - Assess for changes in mentation and behavior  - Position to facilitate oxygenation and minimize respiratory effort  - Oxygen administered by appropriate delivery if ordered  - Initiate smoking cessation education as indicated  - Encourage broncho-pulmonary hygiene including cough, deep breathe, Incentive Spirometry  - Assess the need for suctioning and aspirate as needed  - Assess and instruct to report SOB or any respiratory difficulty  - Respiratory Therapy support as indicated  Outcome: Progressing     Problem: GASTROINTESTINAL - ADULT  Goal: Maintains adequate nutritional intake  Description: INTERVENTIONS:  - Monitor percentage of each meal consumed  - Identify factors contributing to decreased intake, treat as appropriate  - Assist with meals as needed  - Monitor I&O, weight, and lab values if indicated  - Obtain nutrition services referral as needed  Outcome: Progressing     Problem: GENITOURINARY - ADULT  Goal: Maintains or returns to baseline urinary function  Description: INTERVENTIONS:  - Assess urinary function  - Encourage oral fluids to ensure adequate hydration if ordered  - Administer IV fluids as ordered to ensure adequate hydration  - Administer ordered medications as needed  - Offer frequent toileting  - Follow urinary retention protocol if ordered  Outcome: Progressing  Goal: Absence of urinary retention  Description:  INTERVENTIONS:  - Assess patient’s ability to void and empty bladder  - Monitor I/O  - Bladder scan as needed  - Discuss with physician/AP medications to alleviate retention as needed  - Discuss catheterization for long term situations as appropriate  Outcome: Progressing     Problem: METABOLIC, FLUID AND ELECTROLYTES - ADULT  Goal: Electrolytes maintained within normal limits  Description: INTERVENTIONS:  - Monitor labs and assess patient for signs and symptoms of electrolyte imbalances  - Administer electrolyte replacement as ordered  - Monitor response to electrolyte replacements, including repeat lab results as appropriate  - Instruct patient on fluid and nutrition as appropriate  Outcome: Progressing  Goal: Fluid balance maintained  Description: INTERVENTIONS:  - Monitor labs   - Monitor I/O and WT  - Instruct patient on fluid and nutrition as appropriate  - Assess for signs & symptoms of volume excess or deficit  Outcome: Progressing     Problem: SKIN/TISSUE INTEGRITY - ADULT  Goal: Incision(s), wounds(s) or drain site(s) healing without S/S of infection  Description: INTERVENTIONS  - Assess and document dressing, incision, wound bed, drain sites and surrounding tissue  - Provide patient and family education  - Perform skin care/dressing changes   Outcome: Progressing     Problem: HEMATOLOGIC - ADULT  Goal: Maintains hematologic stability  Description: INTERVENTIONS  - Assess for signs and symptoms of bleeding or hemorrhage  - Monitor labs  - Administer supportive blood products/factors as ordered and appropriate  Outcome: Progressing     Problem: MUSCULOSKELETAL - ADULT  Goal: Maintain or return mobility to safest level of function  Description: INTERVENTIONS:  - Assess patient's ability to carry out ADLs; assess patient's baseline for ADL function and identify physical deficits which impact ability to perform ADLs (bathing, care of mouth/teeth, toileting, grooming, dressing, etc.)  - Assess/evaluate cause of  self-care deficits   - Assess range of motion  - Assess patient's mobility  - Assess patient's need for assistive devices and provide as appropriate  - Encourage maximum independence but intervene and supervise when necessary  - Involve family in performance of ADLs  - Assess for home care needs following discharge   - Consider OT consult to assist with ADL evaluation and planning for discharge  - Provide patient education as appropriate  Outcome: Progressing

## 2024-09-18 NOTE — ASSESSMENT & PLAN NOTE
Hemoglobin A1c of 8.0.  Likely risk factor for poor healing.  Ongoing glucose management per primary.

## 2024-09-18 NOTE — ANESTHESIA POSTPROCEDURE EVALUATION
Post-Op Assessment Note    CV Status:  Stable  Pain Score: 0    Pain management: adequate    Multimodal analgesia used between 6 hours prior to anesthesia start to PACU discharge    Mental Status:  Awake   Hydration Status:  Stable   PONV Controlled:  None   Airway Patency:  Patent     Post Op Vitals Reviewed: Yes    No anethesia notable event occurred.    Staff: ZENA               /65 (09/17/24 2025)    Temp 97.6 °F (36.4 °C) (09/17/24 2025)    Pulse 76 (09/17/24 2025)   Resp 12 (09/17/24 2025)    SpO2 93 % (09/17/24 2025)

## 2024-09-18 NOTE — ASSESSMENT & PLAN NOTE
Creatinine appears to be close to prior baseline.  Can impact antibiotic dosing.  Will continue current dosing of Augmentin for now.  Repeat chemistry tomorrow and will dose adjust as needed.

## 2024-09-18 NOTE — PLAN OF CARE
Problem: PAIN - ADULT  Goal: Verbalizes/displays adequate comfort level or baseline comfort level  Description: Interventions:  - Encourage patient to monitor pain and request assistance  - Assess pain using appropriate pain scale  - Administer analgesics based on type and severity of pain and evaluate response  - Implement non-pharmacological measures as appropriate and evaluate response  - Consider cultural and social influences on pain and pain management  - Notify physician/advanced practitioner if interventions unsuccessful or patient reports new pain  Outcome: Progressing     Problem: INFECTION - ADULT  Goal: Absence or prevention of progression during hospitalization  Description: INTERVENTIONS:  - Assess and monitor for signs and symptoms of infection  - Monitor lab/diagnostic results  - Monitor all insertion sites, i.e. indwelling lines, tubes, and drains  - Monitor endotracheal if appropriate and nasal secretions for changes in amount and color  - [place] appropriate cooling/warming therapies per order  - Administer medications as ordered  - Instruct and encourage patient and family to use good hand hygiene technique  - Identify and instruct in appropriate isolation precautions for identified infection/condition  Outcome: Progressing     Problem: SAFETY ADULT  Goal: Patient will remain free of falls  Description: INTERVENTIONS:  - Educate patient/family on patient safety including physical limitations  - Instruct patient to call for assistance with activity   - Consult OT/PT to assist with strengthening/mobility   - Keep Call bell within reach  - Keep bed low and locked with side rails adjusted as appropriate  - Keep care items and personal belongings within reach  - Initiate and maintain comfort rounds  - Make Fall Risk Sign visible to staff  - Apply yellow socks and bracelet for high fall risk patients  - Consider moving patient to room near nurses station  Outcome: Progressing  Goal: Maintain or  return to baseline ADL function  Description: INTERVENTIONS:  -  Assess patient's ability to carry out ADLs; assess patient's baseline for ADL function and identify physical deficits which impact ability to perform ADLs (bathing, care of mouth/teeth, toileting, grooming, dressing, etc.)  - Assess/evaluate cause of self-care deficits   - Assess range of motion  - Assess patient's mobility; develop plan if impaired  - Assess patient's need for assistive devices and provide as appropriate  - Encourage maximum independence but intervene and supervise when necessary  - Involve family in performance of ADLs  - Assess for home care needs following discharge   - Consider OT consult to assist with ADL evaluation and planning for discharge  - Provide patient education as appropriate  Outcome: Progressing     Problem: DISCHARGE PLANNING  Goal: Discharge to home or other facility with appropriate resources  Description: INTERVENTIONS:  - Identify barriers to discharge w/patient and caregiver  - Arrange for needed discharge resources and transportation as appropriate  - Identify discharge learning needs (meds, wound care, etc.)  - Arrange for interpretive services to assist at discharge as needed  - Refer to Case Management Department for coordinating discharge planning if the patient needs post-hospital services based on physician/advanced practitioner order or complex needs related to functional status, cognitive ability, or social support system  Outcome: Progressing     Problem: Knowledge Deficit  Goal: Patient/family/caregiver demonstrates understanding of disease process, treatment plan, medications, and discharge instructions  Description: Complete learning assessment and assess knowledge base.  Interventions:  - Provide teaching at level of understanding  - Provide teaching via preferred learning methods  Outcome: Progressing     Problem: CARDIOVASCULAR - ADULT  Goal: Maintains optimal cardiac output and hemodynamic  stability  Description: INTERVENTIONS:  - Monitor I/O, vital signs and rhythm  - Monitor for S/S and trends of decreased cardiac output  - Administer and titrate ordered vasoactive medications to optimize hemodynamic stability  - Assess quality of pulses, skin color and temperature  - Assess for signs of decreased coronary artery perfusion  - Instruct patient to report change in severity of symptoms  Outcome: Progressing     Problem: RESPIRATORY - ADULT  Goal: Achieves optimal ventilation and oxygenation  Description: INTERVENTIONS:  - Assess for changes in respiratory status  - Assess for changes in mentation and behavior  - Position to facilitate oxygenation and minimize respiratory effort  - Oxygen administered by appropriate delivery if ordered  - Initiate smoking cessation education as indicated  - Encourage broncho-pulmonary hygiene including cough, deep breathe, Incentive Spirometry  - Assess the need for suctioning and aspirate as needed  - Assess and instruct to report SOB or any respiratory difficulty  - Respiratory Therapy support as indicated  Outcome: Progressing     Problem: GASTROINTESTINAL - ADULT  Goal: Minimal or absence of nausea and/or vomiting  Description: INTERVENTIONS:  - Administer IV fluids if ordered to ensure adequate hydration  - Maintain NPO status until nausea and vomiting are resolved  - Nasogastric tube if ordered  - Administer ordered antiemetic medications as needed  - Provide nonpharmacologic comfort measures as appropriate  - Advance diet as tolerated, if ordered  - Consider nutrition services referral to assist patient with adequate nutrition and appropriate food choices  Outcome: Progressing  Goal: Maintains or returns to baseline bowel function  Description: INTERVENTIONS:  - Assess bowel function  - Encourage oral fluids to ensure adequate hydration  - Administer IV fluids if ordered to ensure adequate hydration  - Administer ordered medications as needed  - Encourage  mobilization and activity  - Consider nutritional services referral to assist patient with adequate nutrition and appropriate food choices  Outcome: Progressing  Goal: Maintains adequate nutritional intake  Description: INTERVENTIONS:  - Monitor percentage of each meal consumed  - Identify factors contributing to decreased intake, treat as appropriate  - Assist with meals as needed  - Monitor I&O, weight, and lab values if indicated  - Obtain nutrition services referral as needed  Outcome: Progressing     Problem: GENITOURINARY - ADULT  Goal: Maintains or returns to baseline urinary function  Description: INTERVENTIONS:  - Assess urinary function  - Encourage oral fluids to ensure adequate hydration if ordered  - Administer IV fluids as ordered to ensure adequate hydration  - Administer ordered medications as needed  - Offer frequent toileting  - Follow urinary retention protocol if ordered  Outcome: Progressing  Goal: Absence of urinary retention  Description: INTERVENTIONS:  - Assess patient’s ability to void and empty bladder  - Monitor I/O  - Bladder scan as needed  - Discuss with physician/AP medications to alleviate retention as needed  - Discuss catheterization for long term situations as appropriate  Outcome: Progressing     Problem: METABOLIC, FLUID AND ELECTROLYTES - ADULT  Goal: Electrolytes maintained within normal limits  Description: INTERVENTIONS:  - Monitor labs and assess patient for signs and symptoms of electrolyte imbalances  - Administer electrolyte replacement as ordered  - Monitor response to electrolyte replacements, including repeat lab results as appropriate  - Instruct patient on fluid and nutrition as appropriate  Outcome: Progressing  Goal: Fluid balance maintained  Description: INTERVENTIONS:  - Monitor labs   - Monitor I/O and WT  - Instruct patient on fluid and nutrition as appropriate  - Assess for signs & symptoms of volume excess or deficit  Outcome: Progressing     Problem:  SKIN/TISSUE INTEGRITY - ADULT  Goal: Incision(s), wounds(s) or drain site(s) healing without S/S of infection  Description: INTERVENTIONS  - Assess and document dressing, incision, wound bed, drain sites and surrounding tissue  - Provide patient and family education  - Perform skin care/dressing changes   Outcome: Progressing     Problem: HEMATOLOGIC - ADULT  Goal: Maintains hematologic stability  Description: INTERVENTIONS  - Assess for signs and symptoms of bleeding or hemorrhage  - Monitor labs  - Administer supportive blood products/factors as ordered and appropriate  Outcome: Progressing     Problem: MUSCULOSKELETAL - ADULT  Goal: Maintain or return mobility to safest level of function  Description: INTERVENTIONS:  - Assess patient's ability to carry out ADLs; assess patient's baseline for ADL function and identify physical deficits which impact ability to perform ADLs (bathing, care of mouth/teeth, toileting, grooming, dressing, etc.)  - Assess/evaluate cause of self-care deficits   - Assess range of motion  - Assess patient's mobility  - Assess patient's need for assistive devices and provide as appropriate  - Encourage maximum independence but intervene and supervise when necessary  - Involve family in performance of ADLs  - Assess for home care needs following discharge   - Consider OT consult to assist with ADL evaluation and planning for discharge  - Provide patient education as appropriate  Outcome: Progressing

## 2024-09-18 NOTE — ASSESSMENT & PLAN NOTE
Lab Results   Component Value Date    HGBA1C 8.0 (H) 06/12/2024       Recent Labs     09/17/24  1547 09/17/24 2031 09/18/24  0742 09/18/24  1106   POCGLU 110 96 174* 191*       Blood Sugar Average: Last 72 hrs:  (P) [id number]    -Management per primary team encourage keeping blood glucose levels less than 180 to help control this infection.  Currently well-managed.

## 2024-09-18 NOTE — ASSESSMENT & PLAN NOTE
Lab Results   Component Value Date    EGFR 70 09/18/2024    EGFR 57 09/17/2024    EGFR 54 09/16/2024    CREATININE 1.12 09/18/2024    CREATININE 1.32 (H) 09/17/2024    CREATININE 1.38 (H) 09/16/2024   -Management per primary team

## 2024-09-18 NOTE — ASSESSMENT & PLAN NOTE
Noted on presentation and due to #3.  Unfortunately failed to improve likely in the setting of #1.  Now post or intervention.  Recent clinical images reviewed.  Continue antibiotics as above  Continue to trend fever curve/vitals  Repeat CBC/chemistry tomorrow  Ongoing followed by orthopedics

## 2024-09-18 NOTE — ASSESSMENT & PLAN NOTE
BMI noted to be 37.  This can sometimes impact antibiotic dosing.  Will continue current Unasyn dosing and adjust if needed.

## 2024-09-18 NOTE — OP NOTE
OPERATIVE REPORT  PATIENT NAME: Yoni Castillo    :  1965  MRN: [id number]  Pt Location: [facility] OR ROOM 01    SURGERY DATE: 2024    Surgeons and Role:     * Carroll Mccarthy MD - Primary     * Yesenia Carrasquillo PA-C - Assisting    Preop Diagnosis:  Dog bite, initial encounter [W54.0XXA]  Cellulitis of hand, right [L03.113]    Post-Op Diagnosis Codes:     * Dog bite, initial encounter [W54.0XXA]     * Cellulitis of hand, right [L03.113]    Procedure(s):  Right - Irrigation and debridement right wrist and hand.  Right  2nd, 3rd and 4th dorsal extensor compartment tenosynovectomy.     Specimen(s):  ID Type Source Tests Collected by Time Destination   A : extensor tenosynovial tissue Tissue Hand, Right ANAEROBIC CULTURE AND GRAM STAIN, CULTURE, TISSUE AND GRAM STAIN Carroll Mccarthy MD 2024    B : wrist extensor tendon Wound Hand, Right ANAEROBIC CULTURE AND GRAM STAIN, WOUND CULTURE Carroll Mccarthy MD 2024    C : superficial wrist Wound Hand, Right ANAEROBIC CULTURE AND GRAM STAIN, WOUND CULTURE Carroll Mccarthy MD 2024        Estimated Blood Loss:   Minimal    Drains:  * No LDAs found *    Anesthesia Type:   Choice    Operative Indications:  Dog bite, initial encounter [W54.0XXA]  Cellulitis of hand, right [L03.113]    58 y/o diabetiic M with right wrist/hand infection after dog bit. Failure to progress with bedside I&D and IV antibiotics. Indicated for operative irrigation and debridement.     Operative Findings:  Murky fluid and inflamed tenosynovium around the 2nd, 3rd and 4th dorsal extensor compartment tendons.     No fluid or notable inflammation in the 1st, 5th and 6th dorsal compartments    No fluid within the radiocarpal joint.       Complications:   None    Procedure and Technique:  The patient was greeted in the preoperative area. The risks, benefits, and alternatives of the procedure were again discussed in detail with the patient. Risks include  pain, stiffness, swelling, injury to neurovascular structure, infection, need for reoperation, and anesthetic complications. The patient was amenable to proceed. The operative extremity was marked. Patient was brought to the operating room and placed under anesthesia. A tourniquet was applied to the operative extremity. The operative extremity was prepped and draped in the usual sterile fashion. We held pre-operative antibiotic prophylaxis until cultures were taken, then due dose of Unasyn given. A timeout was performed identifying the name and laterality of the procedure. The limb was gravity exsanguinated and the tourniquet was inflated.     10 cc of 0.25% Marcaine were administered at the surgical site dorsally and another 5 cc volarly.    We extended the previous dorsal I&D site distally and proximally for an incision measuring 8 cm.  This was carried out through subcutaneous tissue.  There was some thickened fibrinous tissue and a clotted dorsal subcutaneous vein which were accounting for the dorsal wrist lump.  Cultures were taken.  These were excised and electrocautery was used to control bleeding at the vein.  We dissected down to the extensor retinaculum and flaps were raised radially and ulnarly.  The dorsal fascia was normal-appearing as well as the retinaculum.  The third dorsal compartment was incised and there was murky fluid encountered.  Additional fluid cultures were taken.  There was also inflamed tenosynovium around the EPL tendon.  The second dorsal compartment was also opened distally and again murky fluid was encountered and inflamed tenosynovium around the ECRB and ECRL tendons.  The second dorsal compartment was released proximally to allow better visualization.  The fourth dorsal compartment was then released with a step cut and the retinaculum to allow for later tension-free closure.  Again murky fluid and inflamed tenosynovium was found around the EIP and EDC tendons.  The first, fifth and  sixth dorsal compartments did not have any murky fluid within them and the tendons appeared normal and healthy.  All tendons were intact dorsally.    We then performed an extensive excisional extensor tenosynovectomy of the second, third and fourth dorsal compartment tendons.  The wound was then thoroughly irrigated with 3 L of saline.  The tendons were then retracted and we attempted to aspirate the wrist joint using 18-gauge needle.  There is a scant amount of synovial fluid encountered but no purulence.  No concern for septic arthritis based on dry tap and his physical exam.  We then took a second look at the tendons were satisfied with our excisional debridement.  There is no further inflamed tenosynovium distally or proximally.  No purulence noted.    We then proceeded to the volar side of the wrist and we extended an incision between the 2 previous puncture wounds.  This was carried down through subcutaneous tissue.  The more distal puncture wound was centered directly over the palmaris tendon and the more proximal puncture wound was tender directly over the FCR tendon.  Both tendons were intact.  There is a small amount of inflamed tenosynovium around the palmaris tendon which was excisionally debrided.  There is also small amount of inflamed tenosynovium around the FCR tendon which was excisionally debrided.  No purulence was encountered.  There was no violation of deeper tissues and the median nerve was intact at this level.  We then thoroughly irrigated the volar wound with saline.    A final look was taken volarly and dorsally and were satisfied with our excisional debridement.    The tourniquet was released and hemostasis achieved.  The retinaculum was closed with 2-0 PDS suture in a tension-free manner over the fourth dorsal compartment.  The EPL was left transposed.  The wound was loosely closed in layers in an interrupted fashion using PDS and nylon suture. Sterile dressings and a volar splint  extending to the fingertips were applied. The patient was awoken and transported to the recovery room in stable condition.     I was present for the entire procedure., A qualified resident physician was not available., and A physician assistant was required during the procedure for retraction, tissue handling, dissection and suturing.    Patient Disposition:  PACU       Postoperatively we will remove his splint on postoperative day 1 and start 3 times daily warm soapy soaks.  He can use a removable splint/brace for soft tissue rest.  Continue IV antibiotics per primary, appreciate their care.  Will follow the OR cultures to help tailor antibiotics.      SIGNATURE: Carroll Mccarthy MD  DATE: September 17, 2024  TIME: 8:10 PM

## 2024-09-18 NOTE — ASSESSMENT & PLAN NOTE
Initial injury was on the day before admission.  Course now complicated by the above.  Patient up-to-date on tetanus booster, reported and no role for rabies prophylaxis based on injury and animal involved.  Antibiotics as above  Ongoing followed by orthopedics  Trend fever curve/WBC

## 2024-09-18 NOTE — CONSULTS
Consultation - Infectious Disease   Name: Yoni Castillo 59 y.o. male I MRN: [id number]  Unit/Bed#: E5 -01 I Date of Admission: 9/11/2024   Date of Service: 9/18/2024 I Hospital Day: 6   Inpatient consult to Infectious Diseases  Consult performed by: Hailey Herr MD  Consult ordered by: Ham Sheehan MD        Physician Requesting Evaluation: Ham Sheehan MD   Reason for Evaluation / Principal Problem: Dog bite      Assessment & Plan  Tenosynovitis  Patient initially presented after #3 with progressing cellulitis of his hand and he failed to improve ultimately going to the OR on 9/17 with findings of tenosynovitis requiring intervention.  OR cultures so far without growth.  Peripheral culture from 9/15 only with broth growth of coagulase-negative staph.  Likely insignificant.  He has remained on Unasyn and is otherwise hemodynamically stable.  Will continue on Unasyn for now  Follow-up pending OR cultures  Continue to trend fever curve/vitals  Repeat CBC/chemistry tomorrow to monitor treatment spot/dosing  Continue to monitor surgical site  Ongoing follow-up by orthopedics  If OR cultures finalized as negative anticipate transition to Augmentin  Anticipate total 3 weeks of therapy from OR intervention for tenosynovitis  No role for rabies prophylaxis as animal is up-to-date and this was not unprovoked  No role for tetanus booster as patient reports last injection was 1 to 2 years ago  Additional supportive care as per primary  Additional interventions pending clinical course  Cellulitis of hand, right  Noted on presentation and due to #3.  Unfortunately failed to improve likely in the setting of #1.  Now post or intervention.  Recent clinical images reviewed.  Continue antibiotics as above  Continue to trend fever curve/vitals  Repeat CBC/chemistry tomorrow  Ongoing followed by orthopedics  Dog bite  Initial injury was on the day before admission.  Course now complicated by the above.  Patient up-to-date  on tetanus booster, reported and no role for rabies prophylaxis based on injury and animal involved.  Antibiotics as above  Ongoing followed by orthopedics  Trend fever curve/WBC  Type 2 diabetes mellitus with chronic kidney disease, without long-term current use of insulin (HCC)  Hemoglobin A1c of 8.0.  Likely risk factor for poor healing.  Ongoing glucose management per primary.    Chronic kidney disease, stage 3 (HCC)  Creatinine appears to be close to prior baseline.  Can impact antibiotic dosing.  Will continue current dosing of Augmentin for now.  Repeat chemistry tomorrow and will dose adjust as needed.    Obesity (BMI 30-39.9)  BMI noted to be 37.  This can sometimes impact antibiotic dosing.  Will continue current Unasyn dosing and adjust if needed.    Above plan discussed in detail with the patient and his family at bedside  Above plan discussed in detail with primary service attending who is aware of plans for ongoing IV antibiotics, following up OR cultures and likely transition to oral therapy potentially by Friday.    ID consult service will continue to follow.    HISTORY OF PRESENT ILLNESS:  HPI: Yoni Castillo is a 59 y.o. year old male with chronic kidney disease, diabetes, hypertension and obesity.  He presented to the hospital on 9/11 after sustaining a dog bite the previous day.  He was providing local care to the site with chlorhexidine and was try to keep it clean and was noticing some intermittent bleeding but he continued to have progressing pain and swelling in the hand prompting him to come into the hospital.  He had not been on antibiotics leading up to hospitalization.  Clinical images reviewed at that time.  Patient denies having any hardware in the hand.  No leukocytosis was noted at the time.  Creatinine was 1.3.  CT of the upper extremity was done which showed mild swelling of the volar wrist keeping with history of dog bite.  Patient reported that the dog is up-to-date on rabies  vaccinations and is his dog.  It was not an unprovoked bite.  The patient reports that he is also up-to-date on his tetanus booster and had it within the last 1 to 2 years.  X-ray of the hand was without any bony abnormalities or dislocation.  Patient was started on Unasyn and was admitted for further evaluations by orthopedics.  He was essentially continued on antibiotics but was overall having very slow improvement.  As his erythema became more localized there was an attempted incision and drainage at the bedside on 9/15.  There was a scant amount of purulence that was noted in the area of induration was deloculated a wound culture was obtained.  Culture from that site only isolated coagulase-negative staph in broth alone.  Patient was also seen by urology over the course of this admission for his large staghorn calculi without any plans for urgent intervention but plans for follow-up as an outpatient.  Ultimately yesterday given his delayed improvement he was recommended to go to the OR for further evaluation.  Patient underwent incision and drainage and 2 through 4 dorsal extensor compartment tenosynovectomy with inflammatory changes noted.  No definitive abscesses were noted and it did not appear concerning for joint involvement based on review of operative report.  Patient is otherwise afebrile.  White blood cell count 6.7.  OR cultures so far preliminary negative.  Patient again has been on Unasyn since 9/11.  No new imaging overnight.  Vitals otherwise stable.  Patient is hopeful to remain on IV antibiotics at least through Friday and we discussed at least following up pending OR cultures to determine final regimen but also discussed given tendon involvement likely 3 weeks total of treatment.  Additional questions answered.  We are consulted for further assistance.    REVIEW OF SYSTEMS:  A complete 12 point system-based review of systems is negative other than that noted in the HPI.    PAST MEDICAL  HISTORY:  Past Medical History:   Diagnosis Date    Chronic kidney disease 2012    Chronic pain disorder     Diabetes mellitus (HCC)     H/O eye surgery     High blood sugar     Hypertension     Kidney stone     Liver disease      Past Surgical History:   Procedure Laterality Date    GANGLION CYST EXCISION Left 3/25/2024    Procedure: EXCISION MUCOID CYST, INDEX FINGER DIP;  Surgeon: Carroll Mccarthy MD;  [facility];  Service: Orthopedics    GANGLION CYST EXCISION Right 5/20/2024    Procedure: EXCISION MUCOID CYST - RIGHT INDEX AND MIDDLE FINGERS;  Surgeon: Carroll Mccarthy MD;  [facility];  Service: Orthopedics    HERNIA REPAIR      INCISION AND DRAINAGE  01/16/2024    KIDNEY SURGERY      KNEE SURGERY  1980    MOUTH SURGERY      TN AMPUTATION METATARSAL W/TOE SINGLE Left 6/1/2022    Procedure: RAY RESECTION FOOT;  Surgeon: Hannah Melgoza DPM;  [facility];  Service: Podiatry    TN INCISION & DRAINAGE ABSCESS COMPLICATED/MULTIPLE Right 9/17/2024    Procedure: Irrigation and debridement right wrist and hand, any indicated procedures;  Surgeon: Carroll Mccarthy MD;  [facility];  Service: Orthopedics    TN RPR AA HERNIA 1ST < 3 CM REDUCIBLE N/A 7/14/2023    Procedure: REPAIR HERNIA INCISIONAL;  Surgeon: Matt Melo MD;  [facility];  Service: General    WOUND DEBRIDEMENT Left 4/28/2022    Procedure: Left foot washout;  Surgeon: Hannah Melgoza DPM;  [facility];  Service: Podiatry    WOUND DEBRIDEMENT Left 6/6/2022    Procedure: DEBRIDEMENT WOUND (WASH OUT);  Surgeon: Hannah Melgoza DPM;  [facility];  Service: Podiatry       FAMILY HISTORY:  Non-contributory    SOCIAL HISTORY:  Social History   Social History     Substance and Sexual Activity   Alcohol Use Yes    Alcohol/week: 1.0 standard drink of alcohol    Types: 1 Cans of beer per week    Comment: ocassional     Social History     Substance and Sexual Activity   Drug Use  Never     Social History     Tobacco Use   Smoking Status Never   Smokeless Tobacco Never       ALLERGIES:  Allergies   Allergen Reactions    Cephalexin Hives     Skin peeling  Tolerates penicillins (tolerated unasyn and zosyn)    Pollen Extract Sneezing    Metformin GI Intolerance       MEDICATIONS:  All current active medications have been reviewed.    PHYSICAL EXAM:  Temp:  [96.8 °F (36 °C)-98.7 °F (37.1 °C)] 98.7 °F (37.1 °C)  HR:  [64-81] 80  Resp:  [12-18] 18  BP: (125-165)/() 137/82  SpO2:  [89 %-96 %] 91 %  Temp (24hrs), Av.7 °F (36.5 °C), Min:96.8 °F (36 °C), Max:98.7 °F (37.1 °C)  Current: Temperature: 98.7 °F (37.1 °C)    Intake/Output Summary (Last 24 hours) at 2024 1431  Last data filed at 2024  Gross per 24 hour   Intake 950 ml   Output --   Net 950 ml       General Appearance:  Appearing well, nontoxic, and in no distress   Head:  Normocephalic, without obvious abnormality, atraumatic   Eyes:  Conjunctiva pink and sclera anicteric, both eyes   Nose: Nares normal, mucosa normal, no drainage   Throat: Oropharynx moist without lesions   Neck: Supple, symmetrical, no adenopathy, no tenderness/mass/nodules   Back:   Symmetric, no curvature, ROM normal, no CVA tenderness   Lungs:   Clear to auscultation bilaterally, respirations unlabored   Chest Wall:  No tenderness or deformity   Heart:  RRR; no murmur, rub or gallop noted   Abdomen:   Soft, non-tender, non-distended, positive bowel sounds    Extremities: No cyanosis, clubbing or edema; surgical site is heavily wrapped at this time.   Skin: No other rashes or lesions. No other draining wounds noted.  Clinical images reviewed.    Lymph nodes: Cervical, supraclavicular nodes normal   Neurologic: Alert and oriented times 3, moving all extremities against gravity       LABS, IMAGING, & OTHER STUDIES:  In completing this consult I have performed an extensive review of the medical records in epic including review of the notes,  radiographs, and laboratory results as detailed below.     Lab Results:  I have personally reviewed pertinent labs.  Comments/Interpretations:white blood cell count stable.    Results from last 7 days   Lab Units 09/18/24  0438 09/17/24  0447 09/16/24  0500   WBC Thousand/uL 6.79 4.15* 4.91   HEMOGLOBIN g/dL 11.7* 10.8* 10.8*   PLATELETS Thousands/uL 225 189 195     Results from last 7 days   Lab Units 09/18/24  0438 09/13/24  0608 09/12/24  0851 09/12/24  0615 09/11/24  1742   POTASSIUM mmol/L 4.3   < > 4.3 3.4* 4.4   CHLORIDE mmol/L 104   < > 105 109* 99   CO2 mmol/L 25   < > 26 21 26   BUN mg/dL 20   < > 23 20 24   CREATININE mg/dL 1.12   < > 1.38* 1.33* 1.34*   EGFR ml/min/1.73sq m 70   < > 54 57 56   CALCIUM mg/dL 8.2*   < > 8.8 6.8* 9.6   AST U/L  --   --  13 12* 17   ALT U/L  --   --  14 11 19   ALK PHOS U/L  --   --  33* 24* 42    < > = values in this interval not displayed.     Results from last 7 days   Lab Units 09/17/24 1924 09/17/24 1923 09/17/24  1918 09/15/24  1117   GRAM STAIN RESULT  1+ Polys  No organisms seen Rare Disintegrating polys  No organisms seen No Polys or Bacteria seen No Polys or Bacteria seen       Imaging Studies:   I have personally reviewed pertinent imaging study reports and images in PACS.  Comments/Interpretations:  CT with mild swelling but no fluid collection.    Other Studies:   I have personally reviewed other pertinent reports as below.  Records in CareEverywhere: No recent cultures.  Nursing home/EMS Records: None  Current/Prior Cultures: OR cultures pending.

## 2024-09-19 LAB
ANION GAP SERPL CALCULATED.3IONS-SCNC: 6 MMOL/L (ref 4–13)
BUN SERPL-MCNC: 22 MG/DL (ref 5–25)
CALCIUM SERPL-MCNC: 8.6 MG/DL (ref 8.4–10.2)
CHLORIDE SERPL-SCNC: 103 MMOL/L (ref 96–108)
CO2 SERPL-SCNC: 28 MMOL/L (ref 21–32)
CREAT SERPL-MCNC: 1.6 MG/DL (ref 0.6–1.3)
ERYTHROCYTE [DISTWIDTH] IN BLOOD BY AUTOMATED COUNT: 13.7 % (ref 11.6–15.1)
GFR SERPL CREATININE-BSD FRML MDRD: 45 ML/MIN/1.73SQ M
GLUCOSE SERPL-MCNC: 108 MG/DL (ref 65–140)
GLUCOSE SERPL-MCNC: 131 MG/DL (ref 65–140)
GLUCOSE SERPL-MCNC: 146 MG/DL (ref 65–140)
GLUCOSE SERPL-MCNC: 166 MG/DL (ref 65–140)
GLUCOSE SERPL-MCNC: 176 MG/DL (ref 65–140)
HCT VFR BLD AUTO: 33.5 % (ref 36.5–49.3)
HGB BLD-MCNC: 11 G/DL (ref 12–17)
MCH RBC QN AUTO: 32 PG (ref 26.8–34.3)
MCHC RBC AUTO-ENTMCNC: 32.8 G/DL (ref 31.4–37.4)
MCV RBC AUTO: 97 FL (ref 82–98)
PLATELET # BLD AUTO: 224 THOUSANDS/UL (ref 149–390)
PMV BLD AUTO: 9.7 FL (ref 8.9–12.7)
POTASSIUM SERPL-SCNC: 4.1 MMOL/L (ref 3.5–5.3)
RBC # BLD AUTO: 3.44 MILLION/UL (ref 3.88–5.62)
SODIUM SERPL-SCNC: 137 MMOL/L (ref 135–147)
WBC # BLD AUTO: 5.65 THOUSAND/UL (ref 4.31–10.16)

## 2024-09-19 PROCEDURE — 82948 REAGENT STRIP/BLOOD GLUCOSE: CPT

## 2024-09-19 PROCEDURE — 85027 COMPLETE CBC AUTOMATED: CPT | Performed by: STUDENT IN AN ORGANIZED HEALTH CARE EDUCATION/TRAINING PROGRAM

## 2024-09-19 PROCEDURE — 99232 SBSQ HOSP IP/OBS MODERATE 35: CPT | Performed by: STUDENT IN AN ORGANIZED HEALTH CARE EDUCATION/TRAINING PROGRAM

## 2024-09-19 PROCEDURE — 99024 POSTOP FOLLOW-UP VISIT: CPT | Performed by: PHYSICIAN ASSISTANT

## 2024-09-19 PROCEDURE — 99232 SBSQ HOSP IP/OBS MODERATE 35: CPT | Performed by: INTERNAL MEDICINE

## 2024-09-19 PROCEDURE — 80048 BASIC METABOLIC PNL TOTAL CA: CPT | Performed by: STUDENT IN AN ORGANIZED HEALTH CARE EDUCATION/TRAINING PROGRAM

## 2024-09-19 RX ORDER — OXYCODONE HYDROCHLORIDE 10 MG/1
10 TABLET ORAL EVERY 6 HOURS PRN
Status: DISCONTINUED | OUTPATIENT
Start: 2024-09-19 | End: 2024-09-20

## 2024-09-19 RX ORDER — OXYCODONE HYDROCHLORIDE 5 MG/1
5 TABLET ORAL EVERY 6 HOURS PRN
Status: DISCONTINUED | OUTPATIENT
Start: 2024-09-19 | End: 2024-09-20

## 2024-09-19 RX ADMIN — OXYCODONE HYDROCHLORIDE AND ACETAMINOPHEN 1000 MG: 500 TABLET ORAL at 21:37

## 2024-09-19 RX ADMIN — Medication 400 UNITS: at 17:10

## 2024-09-19 RX ADMIN — LIDOCAINE 5% 1 PATCH: 700 PATCH TOPICAL at 07:56

## 2024-09-19 RX ADMIN — Medication 400 UNITS: at 07:57

## 2024-09-19 RX ADMIN — OXYCODONE HYDROCHLORIDE 10 MG: 10 TABLET ORAL at 21:37

## 2024-09-19 RX ADMIN — AMPICILLIN SODIUM AND SULBACTAM SODIUM 3 G: 2; 1 INJECTION, POWDER, FOR SOLUTION INTRAMUSCULAR; INTRAVENOUS at 23:25

## 2024-09-19 RX ADMIN — HYDROCHLOROTHIAZIDE 12.5 MG: 12.5 TABLET ORAL at 07:56

## 2024-09-19 RX ADMIN — LISINOPRIL 20 MG: 20 TABLET ORAL at 17:08

## 2024-09-19 RX ADMIN — CARVEDILOL 25 MG: 25 TABLET, FILM COATED ORAL at 07:55

## 2024-09-19 RX ADMIN — HYDROCHLOROTHIAZIDE 12.5 MG: 12.5 TABLET ORAL at 17:08

## 2024-09-19 RX ADMIN — CARVEDILOL 25 MG: 25 TABLET, FILM COATED ORAL at 17:08

## 2024-09-19 RX ADMIN — AMPICILLIN SODIUM AND SULBACTAM SODIUM 3 G: 2; 1 INJECTION, POWDER, FOR SOLUTION INTRAMUSCULAR; INTRAVENOUS at 05:34

## 2024-09-19 RX ADMIN — LISINOPRIL 20 MG: 20 TABLET ORAL at 07:56

## 2024-09-19 RX ADMIN — ACETAMINOPHEN 975 MG: 325 TABLET ORAL at 23:24

## 2024-09-19 RX ADMIN — ACETAMINOPHEN 975 MG: 325 TABLET ORAL at 12:02

## 2024-09-19 RX ADMIN — AMPICILLIN SODIUM AND SULBACTAM SODIUM 3 G: 2; 1 INJECTION, POWDER, FOR SOLUTION INTRAMUSCULAR; INTRAVENOUS at 17:07

## 2024-09-19 RX ADMIN — ACETAMINOPHEN 975 MG: 325 TABLET ORAL at 17:07

## 2024-09-19 RX ADMIN — OXYCODONE HYDROCHLORIDE 10 MG: 10 TABLET ORAL at 13:16

## 2024-09-19 RX ADMIN — ACETAMINOPHEN 975 MG: 325 TABLET ORAL at 05:33

## 2024-09-19 RX ADMIN — AMPICILLIN SODIUM AND SULBACTAM SODIUM 3 G: 2; 1 INJECTION, POWDER, FOR SOLUTION INTRAMUSCULAR; INTRAVENOUS at 12:02

## 2024-09-19 RX ADMIN — OXYCODONE HYDROCHLORIDE AND ACETAMINOPHEN 1000 MG: 500 TABLET ORAL at 07:55

## 2024-09-19 NOTE — ASSESSMENT & PLAN NOTE
Secondary to dog bite. Unfortunately failed to improve on antibiotic alone, likely in the setting of #1.  Now post surgical intervention. He continues to follow with orthopedics.  -antibiotic as above  -serial R hand/wrist exams  -surgical site care per orthopedics  -continue follow up with orthopedics

## 2024-09-19 NOTE — ASSESSMENT & PLAN NOTE
Lab Results   Component Value Date    EGFR 45 09/19/2024    EGFR 70 09/18/2024    EGFR 57 09/17/2024    CREATININE 1.60 (H) 09/19/2024    CREATININE 1.12 09/18/2024    CREATININE 1.32 (H) 09/17/2024   -Management per primary team

## 2024-09-19 NOTE — PROGRESS NOTES
Progress Note - Infectious Disease   Name: Yoni Castillo 59 y.o. male I MRN: [id number]  Unit/Bed#: E5 -01 I Date of Admission: 9/11/2024   Date of Service: 9/19/2024 I Hospital Day: 7     Assessment & Plan  Tenosynovitis  Secondary to R hand dog bite with progressing cellulitis. Patient failed to improve on antibiotic alone and was ultimately taken to the OR on 9/17/2024. OR cultures so far without growth.  Peripheral culture from 9/15/2024 only with broth growth of coagulase-negative staph, likely insignificant.  He has remained on Unasyn and is otherwise hemodynamically stable. I recommend continuing this antibiotic for now. Anticipate eventual discharge home, likely on Augmentin, once final cultures are reviewed. Given tendon involvement would recommend 21 days of antibiotic treatment.  -continue IV unasyn for now  -follow up final OR cultures and adjust antibiotic plan as needed  -anticipate likely transition to PO Augmentin to finish 21 day treatment course  -monitor CBCD and BMP  -monitor vitals  -serial R hand/wrist exams  -surgical site care per orthopedics  -continue follow up with orthopedics  Cellulitis of hand, right  Secondary to dog bite. Unfortunately failed to improve on antibiotic alone, likely in the setting of #1.  Now post surgical intervention. He continues to follow with orthopedics.  -antibiotic as above  -serial R hand/wrist exams  -surgical site care per orthopedics  -continue follow up with orthopedics  Dog bite  Initial injury was on the day before admission.  Course now complicated by the above.  Patient up-to-date on tetanus booster, reported and no role for rabies prophylaxis based on injury and animal involved.  -antibiotic as above  Type 2 diabetes mellitus with chronic kidney disease, without long-term current use of insulin ([facility])  Patient's last HbA1c was 8% on 6/12/2024. Elevated blood glucose is risk factor for wounds and infection.   -recommend tight glycemic  control  -blood glucose management per primary service  Chronic kidney disease, stage 3 (HCC)  This can impact antibiotic dosing. Upon review of patient's available medical records it appears his baseline creatinine is approximately 1.3-1.6. Creatinine remains stable this morning.  -monitor creatinine  -dose adjust antibiotic for renal function as needed  -avoid nephrotoxins  Obesity (BMI 30-39.9)  BMI noted to be 37.37.  This can impact antibiotic dosing.   -monitor patient weight and dose adjust antibiotic as needed    Above plan was discussed in detail with patient at the bedside.  Above plan was discussed in detail with LYLY who agrees with plan for ongoing Unasyn while we await final operative cultures.    Antibiotics:  Unasyn 9  Antibiotics 9  Post op #2    Subjective:  Patient reports he's still having a lot of pain in the right wrist. Reports when hand/wrist are resting they are alright but any movement triggers the pain to start again. Also has difficulty with pain during his soaks. He has no fever, chills, or shakes; has been feeling warm; no nausea, vomiting, abdominal pain, diarrhea, or dysuria; no cough, shortness of breath, or chest pain. No new symptoms.    Objective:  Vitals:  Temp:  [98.2 °F (36.8 °C)-99 °F (37.2 °C)] 98.2 °F (36.8 °C)  HR:  [71-81] 79  Resp:  [18] 18  BP: (131-163)/(80-90) 131/84  SpO2:  [91 %-97 %] 94 %  Temp (24hrs), Av.6 °F (37 °C), Min:98.2 °F (36.8 °C), Max:99 °F (37.2 °C)  Current: Temperature: 98.2 °F (36.8 °C)    Physical Exam:   General Appearance:  Alert, interactive, nontoxic, no acute distress. He appears comfortable supine in bed.   Throat: Oropharynx moist without lesions.    Heart:  RRR; no murmur, rub or gallop.   Extremities: R wrist with heavy dressing and overlying ace wrap, intact without breakthrough drainage, R lower arm elevated on 2 pillows, fingers slightly edematous but with brisk capillary refill.   Skin: No new rashes noted on exposed skin.     Labs,  Imaging, & Other studies:   All pertinent labs and imaging studies were personally reviewed  Results from last 7 days   Lab Units 09/19/24  0534 09/18/24  0438 09/17/24  0447   WBC Thousand/uL 5.65 6.79 4.15*   HEMOGLOBIN g/dL 11.0* 11.7* 10.8*   PLATELETS Thousands/uL 224 225 189     Results from last 7 days   Lab Units 09/19/24  0534 09/13/24  0608 09/12/24  0851   POTASSIUM mmol/L 4.1   < > 4.3   CHLORIDE mmol/L 103   < > 105   CO2 mmol/L 28   < > 26   BUN mg/dL 22   < > 23   CREATININE mg/dL 1.60*   < > 1.38*   EGFR ml/min/1.73sq m 45   < > 54   CALCIUM mg/dL 8.6   < > 8.8   AST U/L  --   --  13   ALT U/L  --   --  14   ALK PHOS U/L  --   --  33*    < > = values in this interval not displayed.     Results from last 7 days   Lab Units 09/17/24 1924 09/17/24  1923 09/17/24  1918 09/15/24  1117   GRAM STAIN RESULT  1+ Polys  No organisms seen Rare Disintegrating polys  No organisms seen No Polys or Bacteria seen No Polys or Bacteria seen

## 2024-09-19 NOTE — ASSESSMENT & PLAN NOTE
Lab Results   Component Value Date    HGBA1C 8.0 (H) 06/12/2024       Recent Labs     09/18/24  1550 09/18/24  2116 09/19/24  0734 09/19/24  1110   POCGLU 179* 129 108 166*       Blood Sugar Average: Last 72 hrs:  (P) [id number]    -Management per primary team encourage keeping blood glucose levels less than 180 to help control this infection.  Currently well-managed.

## 2024-09-19 NOTE — PLAN OF CARE
Problem: PAIN - ADULT  Goal: Verbalizes/displays adequate comfort level or baseline comfort level  Description: Interventions:  - Encourage patient to monitor pain and request assistance  - Assess pain using appropriate pain scale  - Administer analgesics based on type and severity of pain and evaluate response  - Implement non-pharmacological measures as appropriate and evaluate response  - Consider cultural and social influences on pain and pain management  - Notify physician/advanced practitioner if interventions unsuccessful or patient reports new pain  Outcome: Progressing     Problem: INFECTION - ADULT  Goal: Absence or prevention of progression during hospitalization  Description: INTERVENTIONS:  - Assess and monitor for signs and symptoms of infection  - Monitor lab/diagnostic results  - Monitor all insertion sites, i.e. indwelling lines, tubes, and drains  - Monitor endotracheal if appropriate and nasal secretions for changes in amount and color  - [place] appropriate cooling/warming therapies per order  - Administer medications as ordered  - Instruct and encourage patient and family to use good hand hygiene technique  - Identify and instruct in appropriate isolation precautions for identified infection/condition  Outcome: Progressing     Problem: SAFETY ADULT  Goal: Patient will remain free of falls  Description: INTERVENTIONS:  - Educate patient/family on patient safety including physical limitations  - Instruct patient to call for assistance with activity   - Consult OT/PT to assist with strengthening/mobility   - Keep Call bell within reach  - Keep bed low and locked with side rails adjusted as appropriate  - Keep care items and personal belongings within reach  - Initiate and maintain comfort rounds  - Make Fall Risk Sign visible to staff  - Apply yellow socks and bracelet for high fall risk patients  - Consider moving patient to room near nurses station  Outcome: Progressing  Goal: Maintain or  return to baseline ADL function  Description: INTERVENTIONS:  -  Assess patient's ability to carry out ADLs; assess patient's baseline for ADL function and identify physical deficits which impact ability to perform ADLs (bathing, care of mouth/teeth, toileting, grooming, dressing, etc.)  - Assess/evaluate cause of self-care deficits   - Assess range of motion  - Assess patient's mobility; develop plan if impaired  - Assess patient's need for assistive devices and provide as appropriate  - Encourage maximum independence but intervene and supervise when necessary  - Involve family in performance of ADLs  - Assess for home care needs following discharge   - Consider OT consult to assist with ADL evaluation and planning for discharge  - Provide patient education as appropriate  Outcome: Progressing     Problem: DISCHARGE PLANNING  Goal: Discharge to home or other facility with appropriate resources  Description: INTERVENTIONS:  - Identify barriers to discharge w/patient and caregiver  - Arrange for needed discharge resources and transportation as appropriate  - Identify discharge learning needs (meds, wound care, etc.)  - Arrange for interpretive services to assist at discharge as needed  - Refer to Case Management Department for coordinating discharge planning if the patient needs post-hospital services based on physician/advanced practitioner order or complex needs related to functional status, cognitive ability, or social support system  Outcome: Progressing     Problem: Knowledge Deficit  Goal: Patient/family/caregiver demonstrates understanding of disease process, treatment plan, medications, and discharge instructions  Description: Complete learning assessment and assess knowledge base.  Interventions:  - Provide teaching at level of understanding  - Provide teaching via preferred learning methods  Outcome: Progressing     Problem: CARDIOVASCULAR - ADULT  Goal: Maintains optimal cardiac output and hemodynamic  stability  Description: INTERVENTIONS:  - Monitor I/O, vital signs and rhythm  - Monitor for S/S and trends of decreased cardiac output  - Administer and titrate ordered vasoactive medications to optimize hemodynamic stability  - Assess quality of pulses, skin color and temperature  - Assess for signs of decreased coronary artery perfusion  - Instruct patient to report change in severity of symptoms  Outcome: Progressing     Problem: RESPIRATORY - ADULT  Goal: Achieves optimal ventilation and oxygenation  Description: INTERVENTIONS:  - Assess for changes in respiratory status  - Assess for changes in mentation and behavior  - Position to facilitate oxygenation and minimize respiratory effort  - Oxygen administered by appropriate delivery if ordered  - Initiate smoking cessation education as indicated  - Encourage broncho-pulmonary hygiene including cough, deep breathe, Incentive Spirometry  - Assess the need for suctioning and aspirate as needed  - Assess and instruct to report SOB or any respiratory difficulty  - Respiratory Therapy support as indicated  Outcome: Progressing     Problem: GASTROINTESTINAL - ADULT  Goal: Maintains adequate nutritional intake  Description: INTERVENTIONS:  - Monitor percentage of each meal consumed  - Identify factors contributing to decreased intake, treat as appropriate  - Assist with meals as needed  - Monitor I&O, weight, and lab values if indicated  - Obtain nutrition services referral as needed  Outcome: Progressing     Problem: GENITOURINARY - ADULT  Goal: Maintains or returns to baseline urinary function  Description: INTERVENTIONS:  - Assess urinary function  - Encourage oral fluids to ensure adequate hydration if ordered  - Administer IV fluids as ordered to ensure adequate hydration  - Administer ordered medications as needed  - Offer frequent toileting  - Follow urinary retention protocol if ordered  Outcome: Progressing  Goal: Absence of urinary retention  Description:  INTERVENTIONS:  - Assess patient’s ability to void and empty bladder  - Monitor I/O  - Bladder scan as needed  - Discuss with physician/AP medications to alleviate retention as needed  - Discuss catheterization for long term situations as appropriate  Outcome: Progressing     Problem: METABOLIC, FLUID AND ELECTROLYTES - ADULT  Goal: Electrolytes maintained within normal limits  Description: INTERVENTIONS:  - Monitor labs and assess patient for signs and symptoms of electrolyte imbalances  - Administer electrolyte replacement as ordered  - Monitor response to electrolyte replacements, including repeat lab results as appropriate  - Instruct patient on fluid and nutrition as appropriate  Outcome: Progressing  Goal: Fluid balance maintained  Description: INTERVENTIONS:  - Monitor labs   - Monitor I/O and WT  - Instruct patient on fluid and nutrition as appropriate  - Assess for signs & symptoms of volume excess or deficit  Outcome: Progressing     Problem: SKIN/TISSUE INTEGRITY - ADULT  Goal: Incision(s), wounds(s) or drain site(s) healing without S/S of infection  Description: INTERVENTIONS  - Assess and document dressing, incision, wound bed, drain sites and surrounding tissue  - Provide patient and family education  - Perform skin care/dressing changes   Outcome: Progressing     Problem: HEMATOLOGIC - ADULT  Goal: Maintains hematologic stability  Description: INTERVENTIONS  - Assess for signs and symptoms of bleeding or hemorrhage  - Monitor labs  - Administer supportive blood products/factors as ordered and appropriate  Outcome: Progressing     Problem: MUSCULOSKELETAL - ADULT  Goal: Maintain or return mobility to safest level of function  Description: INTERVENTIONS:  - Assess patient's ability to carry out ADLs; assess patient's baseline for ADL function and identify physical deficits which impact ability to perform ADLs (bathing, care of mouth/teeth, toileting, grooming, dressing, etc.)  - Assess/evaluate cause of  self-care deficits   - Assess range of motion  - Assess patient's mobility  - Assess patient's need for assistive devices and provide as appropriate  - Encourage maximum independence but intervene and supervise when necessary  - Involve family in performance of ADLs  - Assess for home care needs following discharge   - Consider OT consult to assist with ADL evaluation and planning for discharge  - Provide patient education as appropriate  Outcome: Progressing

## 2024-09-19 NOTE — ASSESSMENT & PLAN NOTE
BMI noted to be 37.37.  This can impact antibiotic dosing.   -monitor patient weight and dose adjust antibiotic as needed

## 2024-09-19 NOTE — PROGRESS NOTES
Progress Note - Hospitalist   Name: Yoni Castillo 59 y.o. male I MRN: [id number]  Unit/Bed#: E5 -01 I Date of Admission: 2024   Date of Service: 2024 I Hospital Day: 7     Assessment & Plan  Essential hypertension  Continue carvedilol lisinopril and HCTZ  BP currently controlled  Type 2 diabetes mellitus with chronic kidney disease, without long-term current use of insulin (HCC)  Refuses sliding scale insulin  Hold Jardiance with elevated creatinine  Continue diabetic diet    Neuropathy  Patient currently on several vitamins including vitamin C, garlic capsule, vitamin EE for his peripheral neuropathy.    Chronic kidney disease, stage 3 (HCC)  Renal functions currently stable  Dog bite  Managed as above     Cellulitis of hand, right  Dog bite of right hand with swelling and edema  S/p bedside I&D by ortho 09/15  Without significant improvement, underwent I&D in OR on   Wound cultures from I&D on 09/15 growing staph coag negative  Continue IV antibiotics with Unasyn, awaiting OR cultures  Staghorn calculus  Diagnosed on previous ED  Urology consulted, recommend outpatient follow up on 10/10 to further discuss  Tenosynovitis  As above in right hand cellulitis  Obesity (BMI 30-39.9)      VTE Pharmacologic Prophylaxis:   Pharmacologic: Enoxaparin (Lovenox)  Mechanical VTE Prophylaxis in Place: Yes    Current Length of Stay: 7 day(s)    Current Patient Status: Inpatient   Certification Statement: The patient will continue to require additional inpatient hospital stay due to IV abx, await cultures    Discharge Plan: pending    Code Status: Level 1 - Full Code      Subjective:   Pain controlled. Has been afebrile. Ambulating without difficulty.    Objective:     Vitals:   Temp (24hrs), Av.6 °F (37 °C), Min:98.2 °F (36.8 °C), Max:99 °F (37.2 °C)    Temp:  [98.2 °F (36.8 °C)-99 °F (37.2 °C)] 98.2 °F (36.8 °C)  HR:  [71-81] 79  Resp:  [18] 18  BP: (131-163)/(80-90) 131/84  SpO2:  [93 %-97 %] 94  %  Body mass index is 37.37 kg/m².     Input and Output Summary (last 24 hours):       Intake/Output Summary (Last 24 hours) at 9/19/2024 1305  Last data filed at 9/19/2024 0943  Gross per 24 hour   Intake 1080 ml   Output --   Net 1080 ml       Physical Exam:     Physical Exam  Vitals and nursing note reviewed.   Constitutional:       Appearance: Normal appearance. He is obese.   HENT:      Head: Normocephalic and atraumatic.   Eyes:      Conjunctiva/sclera: Conjunctivae normal.   Cardiovascular:      Rate and Rhythm: Normal rate.   Pulmonary:      Effort: Pulmonary effort is normal.      Breath sounds: No wheezing.   Abdominal:      General: Bowel sounds are normal. There is no distension.      Palpations: Abdomen is soft.   Musculoskeletal:         General: No swelling. Normal range of motion.   Skin:     General: Skin is warm and dry.      Comments: Right hand swelling, wrapped in dressing   Neurological:      Mental Status: He is alert. Mental status is at baseline.         Additional Data:     Labs:    Results from last 7 days   Lab Units 09/19/24  0534   WBC Thousand/uL 5.65   HEMOGLOBIN g/dL 11.0*   HEMATOCRIT % 33.5*   PLATELETS Thousands/uL 224     Results from last 7 days   Lab Units 09/19/24  0534   SODIUM mmol/L 137   POTASSIUM mmol/L 4.1   CHLORIDE mmol/L 103   CO2 mmol/L 28   BUN mg/dL 22   CREATININE mg/dL 1.60*   ANION GAP mmol/L 6   CALCIUM mg/dL 8.6   GLUCOSE RANDOM mg/dL 131         Results from last 7 days   Lab Units 09/19/24  1110 09/19/24  0734 09/18/24  2116 09/18/24  1550 09/18/24  1106 09/18/24  0742 09/17/24  2031 09/17/24  1547 09/17/24  1148 09/17/24  0727 09/16/24  2108 09/16/24  1548   POC GLUCOSE mg/dl 166* 108 129 179* 191* 174* 96 110 108 106 158* 195*                   * I Have Reviewed All Lab Data Listed Above.  * Additional Pertinent Lab Tests Reviewed: All Labs For Current Hospital Admission Reviewed    Mobility:  Basic Mobility Inpatient Raw Score: 24  -[facility] Goal: 8: Walk  250 feet or more  [facility] Achieved: 8: Walk 250 feet ot more    Lines:     Invasive Devices       Peripheral Intravenous Line  Duration             Peripheral IV 09/19/24 Left;Ventral (anterior) Forearm <1 day                       Imaging:    Imaging Reports Reviewed Today Include:     XR hand 3+ views RIGHT    Result Date: 9/11/2024  Impression: No acute osseous abnormality. Computerized Assisted Algorithm (CAA) may have been used to analyze all applicable images. Workstation performed: [id number]     CT upper extremity w contrast right    Result Date: 9/11/2024  Impression: Mild swelling of the volar wrist in keeping with provided history of dog bite. No fluid collection. No soft tissue gas. Workstation performed: [id number]        Recent Cultures (last 7 days):     Results from last 7 days   Lab Units 09/17/24  1924 09/17/24  1923 09/17/24  1918 09/15/24  1117   GRAM STAIN RESULT  1+ Polys  No organisms seen Rare Disintegrating polys  No organisms seen No Polys or Bacteria seen No Polys or Bacteria seen   WOUND CULTURE  No growth No growth  --   --        Last 24 Hours Medication List:   Current Facility-Administered Medications   Medication Dose Route Frequency Provider Last Rate    acetaminophen  975 mg Oral Q6H WILTON RONNY Blanchard-KAREN      aluminum-magnesium hydroxide-simethicone  30 mL Oral Q4H PRN RONNY Blanchard-KAREN      ampicillin-sulbactam  3 g Intravenous Q6H RONNY Blanchard-C 3 g (09/19/24 1202)    vitamin C  1,000 mg Oral BID RONNY Blanchard-KAREN      carvedilol  25 mg Oral BID With Meals Yesenia Carrasquillo PA-C      enoxaparin  40 mg Subcutaneous Daily RONNY Blanchard-KAREN      hydroCHLOROthiazide  12.5 mg Oral BID Ham Sheehan MD      lidocaine  1 patch Topical Daily Yesenia Carrasquillo PA-C      lisinopril  20 mg Oral BID Ham Sheehan MD      ondansetron  4 mg Intravenous Q4H PRN Yesenia Carrasquillo PA-C      oxyCODONE  10 mg Oral Q6H PRN Ham Sheehan MD      oxyCODONE  5 mg Oral Q6H PRN Ham Sheehan  MD      senna  1 tablet Oral BID Yesenia Carrasquillo PA-C      vitamin E (tocopherol)  400 Units Oral BID Yesenia Crarasquillo PA-C          Today, Patient Was Seen By: Ham Sheehan MD    ** Please Note: Dictation voice to text software may have been used in the creation of this document. **

## 2024-09-19 NOTE — ASSESSMENT & PLAN NOTE
Dog bite of right hand with swelling and edema  S/p bedside I&D by ortho 09/15  Without significant improvement, underwent I&D in OR on 09/18  Wound cultures from I&D on 09/15 growing staph coag negative  Continue IV antibiotics with Unasyn, awaiting OR cultures

## 2024-09-19 NOTE — ASSESSMENT & PLAN NOTE
POD 2 s/p right hand and wrist I&D with extensor compartment tenosynovectomy with Dr. Mccarthy on 9/17/24.    Plan  Continue warm soapy soaks to the hand and then re-wrap with xeroform, gauze, and ACE bandages with splint underneath.    Partial WB RUE (<5 lbs)  Recommend to continue IV Abx per primary team at this time.   IO cultures pending  Continue Strict elevation to the RUE and ice to the area  Pain control PRN  Medical management per primary   Ortho will continue to follow   F/u with Shari in 1 week.    Routing refill request to provider for review/approval because:  Drug interaction warning - tizanidine    Nery GODFREY RN, BSN, PHN

## 2024-09-19 NOTE — PLAN OF CARE
Problem: PAIN - ADULT  Goal: Verbalizes/displays adequate comfort level or baseline comfort level  Description: Interventions:  - Encourage patient to monitor pain and request assistance  - Assess pain using appropriate pain scale  - Administer analgesics based on type and severity of pain and evaluate response  - Implement non-pharmacological measures as appropriate and evaluate response  - Consider cultural and social influences on pain and pain management  - Notify physician/advanced practitioner if interventions unsuccessful or patient reports new pain  Outcome: Progressing     Problem: INFECTION - ADULT  Goal: Absence or prevention of progression during hospitalization  Description: INTERVENTIONS:  - Assess and monitor for signs and symptoms of infection  - Monitor lab/diagnostic results  - Monitor all insertion sites, i.e. indwelling lines, tubes, and drains  - Monitor endotracheal if appropriate and nasal secretions for changes in amount and color  - [place] appropriate cooling/warming therapies per order  - Administer medications as ordered  - Instruct and encourage patient and family to use good hand hygiene technique  - Identify and instruct in appropriate isolation precautions for identified infection/condition  Outcome: Progressing     Problem: SAFETY ADULT  Goal: Patient will remain free of falls  Description: INTERVENTIONS:  - Educate patient/family on patient safety including physical limitations  - Instruct patient to call for assistance with activity   - Consult OT/PT to assist with strengthening/mobility   - Keep Call bell within reach  - Keep bed low and locked with side rails adjusted as appropriate  - Keep care items and personal belongings within reach  - Initiate and maintain comfort rounds  - Make Fall Risk Sign visible to staff  - Apply yellow socks and bracelet for high fall risk patients  - Consider moving patient to room near nurses station  Outcome: Progressing  Goal: Maintain or  return to baseline ADL function  Description: INTERVENTIONS:  -  Assess patient's ability to carry out ADLs; assess patient's baseline for ADL function and identify physical deficits which impact ability to perform ADLs (bathing, care of mouth/teeth, toileting, grooming, dressing, etc.)  - Assess/evaluate cause of self-care deficits   - Assess range of motion  - Assess patient's mobility; develop plan if impaired  - Assess patient's need for assistive devices and provide as appropriate  - Encourage maximum independence but intervene and supervise when necessary  - Involve family in performance of ADLs  - Assess for home care needs following discharge   - Consider OT consult to assist with ADL evaluation and planning for discharge  - Provide patient education as appropriate  Outcome: Progressing     Problem: DISCHARGE PLANNING  Goal: Discharge to home or other facility with appropriate resources  Description: INTERVENTIONS:  - Identify barriers to discharge w/patient and caregiver  - Arrange for needed discharge resources and transportation as appropriate  - Identify discharge learning needs (meds, wound care, etc.)  - Arrange for interpretive services to assist at discharge as needed  - Refer to Case Management Department for coordinating discharge planning if the patient needs post-hospital services based on physician/advanced practitioner order or complex needs related to functional status, cognitive ability, or social support system  Outcome: Progressing     Problem: Knowledge Deficit  Goal: Patient/family/caregiver demonstrates understanding of disease process, treatment plan, medications, and discharge instructions  Description: Complete learning assessment and assess knowledge base.  Interventions:  - Provide teaching at level of understanding  - Provide teaching via preferred learning methods  Outcome: Progressing     Problem: CARDIOVASCULAR - ADULT  Goal: Maintains optimal cardiac output and hemodynamic  stability  Description: INTERVENTIONS:  - Monitor I/O, vital signs and rhythm  - Monitor for S/S and trends of decreased cardiac output  - Administer and titrate ordered vasoactive medications to optimize hemodynamic stability  - Assess quality of pulses, skin color and temperature  - Assess for signs of decreased coronary artery perfusion  - Instruct patient to report change in severity of symptoms  Outcome: Progressing     Problem: RESPIRATORY - ADULT  Goal: Achieves optimal ventilation and oxygenation  Description: INTERVENTIONS:  - Assess for changes in respiratory status  - Assess for changes in mentation and behavior  - Position to facilitate oxygenation and minimize respiratory effort  - Oxygen administered by appropriate delivery if ordered  - Initiate smoking cessation education as indicated  - Encourage broncho-pulmonary hygiene including cough, deep breathe, Incentive Spirometry  - Assess the need for suctioning and aspirate as needed  - Assess and instruct to report SOB or any respiratory difficulty  - Respiratory Therapy support as indicated  Outcome: Progressing     Problem: GASTROINTESTINAL - ADULT  Goal: Maintains adequate nutritional intake  Description: INTERVENTIONS:  - Monitor percentage of each meal consumed  - Identify factors contributing to decreased intake, treat as appropriate  - Assist with meals as needed  - Monitor I&O, weight, and lab values if indicated  - Obtain nutrition services referral as needed  Outcome: Progressing     Problem: GENITOURINARY - ADULT  Goal: Maintains or returns to baseline urinary function  Description: INTERVENTIONS:  - Assess urinary function  - Encourage oral fluids to ensure adequate hydration if ordered  - Administer IV fluids as ordered to ensure adequate hydration  - Administer ordered medications as needed  - Offer frequent toileting  - Follow urinary retention protocol if ordered  Outcome: Progressing  Goal: Absence of urinary retention  Description:  INTERVENTIONS:  - Assess patient’s ability to void and empty bladder  - Monitor I/O  - Bladder scan as needed  - Discuss with physician/AP medications to alleviate retention as needed  - Discuss catheterization for long term situations as appropriate  Outcome: Progressing     Problem: METABOLIC, FLUID AND ELECTROLYTES - ADULT  Goal: Electrolytes maintained within normal limits  Description: INTERVENTIONS:  - Monitor labs and assess patient for signs and symptoms of electrolyte imbalances  - Administer electrolyte replacement as ordered  - Monitor response to electrolyte replacements, including repeat lab results as appropriate  - Instruct patient on fluid and nutrition as appropriate  Outcome: Progressing  Goal: Fluid balance maintained  Description: INTERVENTIONS:  - Monitor labs   - Monitor I/O and WT  - Instruct patient on fluid and nutrition as appropriate  - Assess for signs & symptoms of volume excess or deficit  Outcome: Progressing     Problem: SKIN/TISSUE INTEGRITY - ADULT  Goal: Incision(s), wounds(s) or drain site(s) healing without S/S of infection  Description: INTERVENTIONS  - Assess and document dressing, incision, wound bed, drain sites and surrounding tissue  - Provide patient and family education  - Perform skin care/dressing changes   Outcome: Progressing     Problem: HEMATOLOGIC - ADULT  Goal: Maintains hematologic stability  Description: INTERVENTIONS  - Assess for signs and symptoms of bleeding or hemorrhage  - Monitor labs  - Administer supportive blood products/factors as ordered and appropriate  Outcome: Progressing     Problem: MUSCULOSKELETAL - ADULT  Goal: Maintain or return mobility to safest level of function  Description: INTERVENTIONS:  - Assess patient's ability to carry out ADLs; assess patient's baseline for ADL function and identify physical deficits which impact ability to perform ADLs (bathing, care of mouth/teeth, toileting, grooming, dressing, etc.)  - Assess/evaluate cause of  self-care deficits   - Assess range of motion  - Assess patient's mobility  - Assess patient's need for assistive devices and provide as appropriate  - Encourage maximum independence but intervene and supervise when necessary  - Involve family in performance of ADLs  - Assess for home care needs following discharge   - Consider OT consult to assist with ADL evaluation and planning for discharge  - Provide patient education as appropriate  Outcome: Progressing

## 2024-09-19 NOTE — PROGRESS NOTES
Progress Note - Orthopedics   Name: Yoni Castillo 59 y.o. male I MRN: [id number]  Unit/Bed#: E5 -01 I Date of Admission: 9/11/2024   Date of Service: 9/19/2024 I Hospital Day: 7     Assessment & Plan  Cellulitis of hand, right  POD 2 s/p right hand and wrist I&D with extensor compartment tenosynovectomy with Dr. Mccarthy on 9/17/24.    Plan  Continue warm soapy soaks to the hand and then re-wrap with xeroform, gauze, and ACE bandages with splint underneath.    Partial WB RUE (<5 lbs)  Recommend to continue IV Abx per primary team at this time.   IO cultures pending  Continue Strict elevation to the RUE and ice to the area  Pain control PRN  Medical management per primary   Ortho will continue to follow   F/u with Shari in 1 week.   Dog bite  -See above management  Essential hypertension  -Management per primary team    Type 2 diabetes mellitus with chronic kidney disease, without long-term current use of insulin ([facility])  Lab Results   Component Value Date    HGBA1C 8.0 (H) 06/12/2024       Recent Labs     09/18/24  1550 09/18/24  2116 09/19/24  0734 09/19/24  1110   POCGLU 179* 129 108 166*       Blood Sugar Average: Last 72 hrs:  (P) [id number]    -Management per primary team encourage keeping blood glucose levels less than 180 to help control this infection.  Currently well-managed.  Neuropathy  -Management per primary team  Chronic kidney disease, stage 3 ([facility])  Lab Results   Component Value Date    EGFR 45 09/19/2024    EGFR 70 09/18/2024    EGFR 57 09/17/2024    CREATININE 1.60 (H) 09/19/2024    CREATININE 1.12 09/18/2024    CREATININE 1.32 (H) 09/17/2024   -Management per primary team  [place]ag[place] calculus    Tenosynovitis    Obesity (BMI 30-39.9)      Orthopedics service will follow.    History of Present Illness   59 y.o.male POD 2 s/p right hand and wrist I&D with extensor compartment tenosynovectomy.  No new acute overnight events, no new complaints.  The patient's pain and swelling have  continued to improve.  He has been doing the soapy soaks as instructed.  He feels that the redness and swelling are improving daily.  He is still mostly limited with extension of his third fourth and fifth fingers of the right hand.  He is denying any numbness today.  he denies subjective fevers, chills, CP, SOB, or N/V.    Objective      Temp:  [98.2 °F (36.8 °C)-99 °F (37.2 °C)] 98.2 °F (36.8 °C)  HR:  [71-81] 79  Resp:  [18] 18  BP: (131-163)/(80-90) 131/84  O2 Device: None (Room air)          I/O         09/17 0701  09/18 0700 09/18 0701  09/19 0700 09/19 0701  09/20 0700    P.O.   1980    I.V. (mL/kg) 950 (7.6)  40 (0.3)    IV Piggyback   100    Total Intake(mL/kg) 950 (7.6)  2120 (17)    Net +950  +2120                 Lines/Drains/Airways       Active Status       None                  Physical Exam  Vitals and nursing note reviewed.   Constitutional:       General: He is not in acute distress.     Appearance: He is well-developed.   HENT:      Head: Normocephalic and atraumatic.   Eyes:      Conjunctiva/sclera: Conjunctivae normal.   Cardiovascular:      Rate and Rhythm: Normal rate.   Pulmonary:      Effort: Pulmonary effort is normal. No respiratory distress.      Breath sounds: Normal breath sounds.   Abdominal:      Palpations: Abdomen is soft.      Tenderness: There is no abdominal tenderness.   Musculoskeletal:      Cervical back: Neck supple.   Skin:     General: Skin is warm and dry.      Capillary Refill: Capillary refill takes less than 2 seconds.   Neurological:      Mental Status: He is alert.   Psychiatric:         Mood and Affect: Mood normal.      Musculoskeletal: right upper  Dorsal hand:  Wound is well approximated.  No active drainage.  The skin surrounding the wound appears only mildly erythematous.  Improved in comparison to yesterday's pictures.  Volar hand:  Wound is well approximated.  No active drainage.  No significant surrounding erythema.  TTP over the entire hand  SILT R/M/U  nerves.  Patient is having trouble moving all fingers due to hand pain.        Lab Results: I have reviewed the following results: CBC/BMP:   .     09/19/24  0534   WBC 5.65   HGB 11.0*   HCT 33.5*      SODIUM 137   K 4.1      CO2 28   BUN 22   CREATININE 1.60*   GLUC 131      Recent Labs     09/17/24  0447 09/18/24  0438 09/19/24  0534   WBC 4.15* 6.79 5.65   HGB 10.8* 11.7* 11.0*   HCT 32.8* 35.2* 33.5*    225 224   BUN 25 20 22   CREATININE 1.32* 1.12 1.60*     Blood Culture:    Lab Results   Component Value Date    BLOODCX No Growth After 5 Days. 11/28/2023     Wound Culture:   Lab Results   Component Value Date    WOUNDCULT No growth 09/17/2024

## 2024-09-19 NOTE — ASSESSMENT & PLAN NOTE
Initial injury was on the day before admission.  Course now complicated by the above.  Patient up-to-date on tetanus booster, reported and no role for rabies prophylaxis based on injury and animal involved.  -antibiotic as above

## 2024-09-19 NOTE — ASSESSMENT & PLAN NOTE
Patient's last HbA1c was 8% on 6/12/2024. Elevated blood glucose is risk factor for wounds and infection.   -recommend tight glycemic control  -blood glucose management per primary service

## 2024-09-19 NOTE — PLAN OF CARE
Problem: PAIN - ADULT  Goal: Verbalizes/displays adequate comfort level or baseline comfort level  Description: Interventions:  - Encourage patient to monitor pain and request assistance  - Assess pain using appropriate pain scale  - Administer analgesics based on type and severity of pain and evaluate response  - Implement non-pharmacological measures as appropriate and evaluate response  - Consider cultural and social influences on pain and pain management  - Notify physician/advanced practitioner if interventions unsuccessful or patient reports new pain  Outcome: Progressing     Problem: INFECTION - ADULT  Goal: Absence or prevention of progression during hospitalization  Description: INTERVENTIONS:  - Assess and monitor for signs and symptoms of infection  - Monitor lab/diagnostic results  - Monitor all insertion sites, i.e. indwelling lines, tubes, and drains  - Monitor endotracheal if appropriate and nasal secretions for changes in amount and color  - [place] appropriate cooling/warming therapies per order  - Administer medications as ordered  - Instruct and encourage patient and family to use good hand hygiene technique  - Identify and instruct in appropriate isolation precautions for identified infection/condition  Outcome: Progressing     Problem: SAFETY ADULT  Goal: Patient will remain free of falls  Description: INTERVENTIONS:  - Educate patient/family on patient safety including physical limitations  - Instruct patient to call for assistance with activity   - Consult OT/PT to assist with strengthening/mobility   - Keep Call bell within reach  - Keep bed low and locked with side rails adjusted as appropriate  - Keep care items and personal belongings within reach  - Initiate and maintain comfort rounds  - Make Fall Risk Sign visible to staff  - Apply yellow socks and bracelet for high fall risk patients  - Consider moving patient to room near nurses station  Outcome: Progressing  Goal: Maintain or  return to baseline ADL function  Description: INTERVENTIONS:  -  Assess patient's ability to carry out ADLs; assess patient's baseline for ADL function and identify physical deficits which impact ability to perform ADLs (bathing, care of mouth/teeth, toileting, grooming, dressing, etc.)  - Assess/evaluate cause of self-care deficits   - Assess range of motion  - Assess patient's mobility; develop plan if impaired  - Assess patient's need for assistive devices and provide as appropriate  - Encourage maximum independence but intervene and supervise when necessary  - Involve family in performance of ADLs  - Assess for home care needs following discharge   - Consider OT consult to assist with ADL evaluation and planning for discharge  - Provide patient education as appropriate  Outcome: Progressing     Problem: DISCHARGE PLANNING  Goal: Discharge to home or other facility with appropriate resources  Description: INTERVENTIONS:  - Identify barriers to discharge w/patient and caregiver  - Arrange for needed discharge resources and transportation as appropriate  - Identify discharge learning needs (meds, wound care, etc.)  - Arrange for interpretive services to assist at discharge as needed  - Refer to Case Management Department for coordinating discharge planning if the patient needs post-hospital services based on physician/advanced practitioner order or complex needs related to functional status, cognitive ability, or social support system  Outcome: Progressing     Problem: Knowledge Deficit  Goal: Patient/family/caregiver demonstrates understanding of disease process, treatment plan, medications, and discharge instructions  Description: Complete learning assessment and assess knowledge base.  Interventions:  - Provide teaching at level of understanding  - Provide teaching via preferred learning methods  Outcome: Progressing     Problem: CARDIOVASCULAR - ADULT  Goal: Maintains optimal cardiac output and hemodynamic  stability  Description: INTERVENTIONS:  - Monitor I/O, vital signs and rhythm  - Monitor for S/S and trends of decreased cardiac output  - Administer and titrate ordered vasoactive medications to optimize hemodynamic stability  - Assess quality of pulses, skin color and temperature  - Assess for signs of decreased coronary artery perfusion  - Instruct patient to report change in severity of symptoms  Outcome: Progressing     Problem: RESPIRATORY - ADULT  Goal: Achieves optimal ventilation and oxygenation  Description: INTERVENTIONS:  - Assess for changes in respiratory status  - Assess for changes in mentation and behavior  - Position to facilitate oxygenation and minimize respiratory effort  - Oxygen administered by appropriate delivery if ordered  - Initiate smoking cessation education as indicated  - Encourage broncho-pulmonary hygiene including cough, deep breathe, Incentive Spirometry  - Assess the need for suctioning and aspirate as needed  - Assess and instruct to report SOB or any respiratory difficulty  - Respiratory Therapy support as indicated  Outcome: Progressing     Problem: GASTROINTESTINAL - ADULT  Goal: Maintains adequate nutritional intake  Description: INTERVENTIONS:  - Monitor percentage of each meal consumed  - Identify factors contributing to decreased intake, treat as appropriate  - Assist with meals as needed  - Monitor I&O, weight, and lab values if indicated  - Obtain nutrition services referral as needed  Outcome: Progressing     Problem: GENITOURINARY - ADULT  Goal: Maintains or returns to baseline urinary function  Description: INTERVENTIONS:  - Assess urinary function  - Encourage oral fluids to ensure adequate hydration if ordered  - Administer IV fluids as ordered to ensure adequate hydration  - Administer ordered medications as needed  - Offer frequent toileting  - Follow urinary retention protocol if ordered  Outcome: Progressing  Goal: Absence of urinary retention  Description:  INTERVENTIONS:  - Assess patient’s ability to void and empty bladder  - Monitor I/O  - Bladder scan as needed  - Discuss with physician/AP medications to alleviate retention as needed  - Discuss catheterization for long term situations as appropriate  Outcome: Progressing     Problem: METABOLIC, FLUID AND ELECTROLYTES - ADULT  Goal: Electrolytes maintained within normal limits  Description: INTERVENTIONS:  - Monitor labs and assess patient for signs and symptoms of electrolyte imbalances  - Administer electrolyte replacement as ordered  - Monitor response to electrolyte replacements, including repeat lab results as appropriate  - Instruct patient on fluid and nutrition as appropriate  Outcome: Progressing  Goal: Fluid balance maintained  Description: INTERVENTIONS:  - Monitor labs   - Monitor I/O and WT  - Instruct patient on fluid and nutrition as appropriate  - Assess for signs & symptoms of volume excess or deficit  Outcome: Progressing     Problem: SKIN/TISSUE INTEGRITY - ADULT  Goal: Incision(s), wounds(s) or drain site(s) healing without S/S of infection  Description: INTERVENTIONS  - Assess and document dressing, incision, wound bed, drain sites and surrounding tissue  - Provide patient and family education  - Perform skin care/dressing changes   Outcome: Progressing     Problem: HEMATOLOGIC - ADULT  Goal: Maintains hematologic stability  Description: INTERVENTIONS  - Assess for signs and symptoms of bleeding or hemorrhage  - Monitor labs  - Administer supportive blood products/factors as ordered and appropriate  Outcome: Progressing     Problem: MUSCULOSKELETAL - ADULT  Goal: Maintain or return mobility to safest level of function  Description: INTERVENTIONS:  - Assess patient's ability to carry out ADLs; assess patient's baseline for ADL function and identify physical deficits which impact ability to perform ADLs (bathing, care of mouth/teeth, toileting, grooming, dressing, etc.)  - Assess/evaluate cause of  self-care deficits   - Assess range of motion  - Assess patient's mobility  - Assess patient's need for assistive devices and provide as appropriate  - Encourage maximum independence but intervene and supervise when necessary  - Involve family in performance of ADLs  - Assess for home care needs following discharge   - Consider OT consult to assist with ADL evaluation and planning for discharge  - Provide patient education as appropriate  Outcome: Progressing

## 2024-09-19 NOTE — ASSESSMENT & PLAN NOTE
Secondary to R hand dog bite with progressing cellulitis. Patient failed to improve on antibiotic alone and was ultimately taken to the OR on 9/17/2024. OR cultures so far without growth.  Peripheral culture from 9/15/2024 only with broth growth of coagulase-negative staph, likely insignificant.  He has remained on Unasyn and is otherwise hemodynamically stable. I recommend continuing this antibiotic for now. Anticipate eventual discharge home, likely on Augmentin, once final cultures are reviewed. Given tendon involvement would recommend 21 days of antibiotic treatment.  -continue IV unasyn for now  -follow up final OR cultures and adjust antibiotic plan as needed  -anticipate likely transition to PO Augmentin to finish 21 day treatment course  -monitor CBCD and BMP  -monitor vitals  -serial R hand/wrist exams  -surgical site care per orthopedics  -continue follow up with orthopedics

## 2024-09-19 NOTE — ASSESSMENT & PLAN NOTE
This can impact antibiotic dosing. Upon review of patient's available medical records it appears his baseline creatinine is approximately 1.3-1.6. Creatinine remains stable this morning.  -monitor creatinine  -dose adjust antibiotic for renal function as needed  -avoid nephrotoxins

## 2024-09-20 LAB
ALBUMIN SERPL BCG-MCNC: 3.7 G/DL (ref 3.5–5)
ALP SERPL-CCNC: 45 U/L (ref 34–104)
ALT SERPL W P-5'-P-CCNC: 17 U/L (ref 7–52)
ANION GAP SERPL CALCULATED.3IONS-SCNC: 7 MMOL/L (ref 4–13)
AST SERPL W P-5'-P-CCNC: 18 U/L (ref 13–39)
BASOPHILS # BLD AUTO: 0.04 THOUSANDS/ΜL (ref 0–0.1)
BASOPHILS NFR BLD AUTO: 1 % (ref 0–1)
BILIRUB SERPL-MCNC: 0.27 MG/DL (ref 0.2–1)
BUN SERPL-MCNC: 22 MG/DL (ref 5–25)
CALCIUM SERPL-MCNC: 8.6 MG/DL (ref 8.4–10.2)
CHLORIDE SERPL-SCNC: 102 MMOL/L (ref 96–108)
CO2 SERPL-SCNC: 26 MMOL/L (ref 21–32)
CREAT SERPL-MCNC: 1.41 MG/DL (ref 0.6–1.3)
EOSINOPHIL # BLD AUTO: 0.18 THOUSAND/ΜL (ref 0–0.61)
EOSINOPHIL NFR BLD AUTO: 4 % (ref 0–6)
ERYTHROCYTE [DISTWIDTH] IN BLOOD BY AUTOMATED COUNT: 13.6 % (ref 11.6–15.1)
GFR SERPL CREATININE-BSD FRML MDRD: 53 ML/MIN/1.73SQ M
GLUCOSE SERPL-MCNC: 124 MG/DL (ref 65–140)
GLUCOSE SERPL-MCNC: 124 MG/DL (ref 65–140)
GLUCOSE SERPL-MCNC: 152 MG/DL (ref 65–140)
GLUCOSE SERPL-MCNC: 171 MG/DL (ref 65–140)
GLUCOSE SERPL-MCNC: 187 MG/DL (ref 65–140)
HCT VFR BLD AUTO: 33.1 % (ref 36.5–49.3)
HGB BLD-MCNC: 10.8 G/DL (ref 12–17)
IMM GRANULOCYTES # BLD AUTO: 0.01 THOUSAND/UL (ref 0–0.2)
IMM GRANULOCYTES NFR BLD AUTO: 0 % (ref 0–2)
LYMPHOCYTES # BLD AUTO: 1.15 THOUSANDS/ΜL (ref 0.6–4.47)
LYMPHOCYTES NFR BLD AUTO: 24 % (ref 14–44)
MCH RBC QN AUTO: 31.5 PG (ref 26.8–34.3)
MCHC RBC AUTO-ENTMCNC: 32.6 G/DL (ref 31.4–37.4)
MCV RBC AUTO: 97 FL (ref 82–98)
MONOCYTES # BLD AUTO: 0.57 THOUSAND/ΜL (ref 0.17–1.22)
MONOCYTES NFR BLD AUTO: 12 % (ref 4–12)
NEUTROPHILS # BLD AUTO: 2.85 THOUSANDS/ΜL (ref 1.85–7.62)
NEUTS SEG NFR BLD AUTO: 59 % (ref 43–75)
NRBC BLD AUTO-RTO: 0 /100 WBCS
PLATELET # BLD AUTO: 218 THOUSANDS/UL (ref 149–390)
PMV BLD AUTO: 9.5 FL (ref 8.9–12.7)
POTASSIUM SERPL-SCNC: 3.9 MMOL/L (ref 3.5–5.3)
PROT SERPL-MCNC: 6.5 G/DL (ref 6.4–8.4)
RBC # BLD AUTO: 3.43 MILLION/UL (ref 3.88–5.62)
SODIUM SERPL-SCNC: 135 MMOL/L (ref 135–147)
WBC # BLD AUTO: 4.8 THOUSAND/UL (ref 4.31–10.16)

## 2024-09-20 PROCEDURE — 99232 SBSQ HOSP IP/OBS MODERATE 35: CPT | Performed by: STUDENT IN AN ORGANIZED HEALTH CARE EDUCATION/TRAINING PROGRAM

## 2024-09-20 PROCEDURE — 85025 COMPLETE CBC W/AUTO DIFF WBC: CPT | Performed by: INTERNAL MEDICINE

## 2024-09-20 PROCEDURE — 80053 COMPREHEN METABOLIC PANEL: CPT | Performed by: INTERNAL MEDICINE

## 2024-09-20 PROCEDURE — 82948 REAGENT STRIP/BLOOD GLUCOSE: CPT

## 2024-09-20 PROCEDURE — 99233 SBSQ HOSP IP/OBS HIGH 50: CPT | Performed by: INTERNAL MEDICINE

## 2024-09-20 RX ORDER — OXYCODONE HYDROCHLORIDE 10 MG/1
10 TABLET ORAL EVERY 6 HOURS PRN
Status: DISCONTINUED | OUTPATIENT
Start: 2024-09-20 | End: 2024-09-21 | Stop reason: HOSPADM

## 2024-09-20 RX ORDER — TRAMADOL HYDROCHLORIDE 50 MG/1
100 TABLET ORAL
Status: DISCONTINUED | OUTPATIENT
Start: 2024-09-20 | End: 2024-09-21 | Stop reason: HOSPADM

## 2024-09-20 RX ADMIN — Medication 400 UNITS: at 09:28

## 2024-09-20 RX ADMIN — LISINOPRIL 20 MG: 20 TABLET ORAL at 18:41

## 2024-09-20 RX ADMIN — OXYCODONE HYDROCHLORIDE AND ACETAMINOPHEN 1000 MG: 500 TABLET ORAL at 20:51

## 2024-09-20 RX ADMIN — AMPICILLIN SODIUM AND SULBACTAM SODIUM 3 G: 2; 1 INJECTION, POWDER, FOR SOLUTION INTRAMUSCULAR; INTRAVENOUS at 11:47

## 2024-09-20 RX ADMIN — LISINOPRIL 20 MG: 20 TABLET ORAL at 09:28

## 2024-09-20 RX ADMIN — CARVEDILOL 25 MG: 25 TABLET, FILM COATED ORAL at 16:24

## 2024-09-20 RX ADMIN — TRAMADOL HYDROCHLORIDE 100 MG: 50 TABLET, COATED ORAL at 22:50

## 2024-09-20 RX ADMIN — CARVEDILOL 25 MG: 25 TABLET, FILM COATED ORAL at 07:50

## 2024-09-20 RX ADMIN — AMPICILLIN SODIUM AND SULBACTAM SODIUM 3 G: 2; 1 INJECTION, POWDER, FOR SOLUTION INTRAMUSCULAR; INTRAVENOUS at 05:26

## 2024-09-20 RX ADMIN — ACETAMINOPHEN 975 MG: 325 TABLET ORAL at 11:47

## 2024-09-20 RX ADMIN — OXYCODONE HYDROCHLORIDE 10 MG: 10 TABLET ORAL at 11:47

## 2024-09-20 RX ADMIN — ACETAMINOPHEN 975 MG: 325 TABLET ORAL at 05:26

## 2024-09-20 RX ADMIN — OXYCODONE HYDROCHLORIDE AND ACETAMINOPHEN 1000 MG: 500 TABLET ORAL at 09:29

## 2024-09-20 RX ADMIN — ENOXAPARIN SODIUM 40 MG: 40 INJECTION SUBCUTANEOUS at 09:29

## 2024-09-20 RX ADMIN — Medication 400 UNITS: at 18:40

## 2024-09-20 RX ADMIN — AMPICILLIN SODIUM AND SULBACTAM SODIUM 3 G: 2; 1 INJECTION, POWDER, FOR SOLUTION INTRAMUSCULAR; INTRAVENOUS at 18:39

## 2024-09-20 RX ADMIN — HYDROCHLOROTHIAZIDE 12.5 MG: 12.5 TABLET ORAL at 18:41

## 2024-09-20 NOTE — ASSESSMENT & PLAN NOTE
BMI noted to be 37.37.  This can impact antibiotic dosing.   -monitor patient weight and dose adjust antibiotic as needed   35

## 2024-09-20 NOTE — PLAN OF CARE
Problem: PAIN - ADULT  Goal: Verbalizes/displays adequate comfort level or baseline comfort level  Description: Interventions:  - Encourage patient to monitor pain and request assistance  - Assess pain using appropriate pain scale  - Administer analgesics based on type and severity of pain and evaluate response  - Implement non-pharmacological measures as appropriate and evaluate response  - Consider cultural and social influences on pain and pain management  - Notify physician/advanced practitioner if interventions unsuccessful or patient reports new pain  Outcome: Progressing     Problem: INFECTION - ADULT  Goal: Absence or prevention of progression during hospitalization  Description: INTERVENTIONS:  - Assess and monitor for signs and symptoms of infection  - Monitor lab/diagnostic results  - Monitor all insertion sites, i.e. indwelling lines, tubes, and drains  - Monitor endotracheal if appropriate and nasal secretions for changes in amount and color  - Duarte appropriate cooling/warming therapies per order  - Administer medications as ordered  - Instruct and encourage patient and family to use good hand hygiene technique  - Identify and instruct in appropriate isolation precautions for identified infection/condition  Outcome: Progressing     Problem: SAFETY ADULT  Goal: Patient will remain free of falls  Description: INTERVENTIONS:  - Educate patient/family on patient safety including physical limitations  - Instruct patient to call for assistance with activity   - Consult OT/PT to assist with strengthening/mobility   - Keep Call bell within reach  - Keep bed low and locked with side rails adjusted as appropriate  - Keep care items and personal belongings within reach  - Initiate and maintain comfort rounds  - Make Fall Risk Sign visible to staff  - Apply yellow socks and bracelet for high fall risk patients  - Consider moving patient to room near nurses station  Outcome: Progressing  Goal: Maintain or  return to baseline ADL function  Description: INTERVENTIONS:  -  Assess patient's ability to carry out ADLs; assess patient's baseline for ADL function and identify physical deficits which impact ability to perform ADLs (bathing, care of mouth/teeth, toileting, grooming, dressing, etc.)  - Assess/evaluate cause of self-care deficits   - Assess range of motion  - Assess patient's mobility; develop plan if impaired  - Assess patient's need for assistive devices and provide as appropriate  - Encourage maximum independence but intervene and supervise when necessary  - Involve family in performance of ADLs  - Assess for home care needs following discharge   - Consider OT consult to assist with ADL evaluation and planning for discharge  - Provide patient education as appropriate  Outcome: Progressing     Problem: DISCHARGE PLANNING  Goal: Discharge to home or other facility with appropriate resources  Description: INTERVENTIONS:  - Identify barriers to discharge w/patient and caregiver  - Arrange for needed discharge resources and transportation as appropriate  - Identify discharge learning needs (meds, wound care, etc.)  - Arrange for interpretive services to assist at discharge as needed  - Refer to Case Management Department for coordinating discharge planning if the patient needs post-hospital services based on physician/advanced practitioner order or complex needs related to functional status, cognitive ability, or social support system  Outcome: Progressing     Problem: Knowledge Deficit  Goal: Patient/family/caregiver demonstrates understanding of disease process, treatment plan, medications, and discharge instructions  Description: Complete learning assessment and assess knowledge base.  Interventions:  - Provide teaching at level of understanding  - Provide teaching via preferred learning methods  Outcome: Progressing     Problem: CARDIOVASCULAR - ADULT  Goal: Maintains optimal cardiac output and hemodynamic  stability  Description: INTERVENTIONS:  - Monitor I/O, vital signs and rhythm  - Monitor for S/S and trends of decreased cardiac output  - Administer and titrate ordered vasoactive medications to optimize hemodynamic stability  - Assess quality of pulses, skin color and temperature  - Assess for signs of decreased coronary artery perfusion  - Instruct patient to report change in severity of symptoms  Outcome: Progressing     Problem: RESPIRATORY - ADULT  Goal: Achieves optimal ventilation and oxygenation  Description: INTERVENTIONS:  - Assess for changes in respiratory status  - Assess for changes in mentation and behavior  - Position to facilitate oxygenation and minimize respiratory effort  - Oxygen administered by appropriate delivery if ordered  - Initiate smoking cessation education as indicated  - Encourage broncho-pulmonary hygiene including cough, deep breathe, Incentive Spirometry  - Assess the need for suctioning and aspirate as needed  - Assess and instruct to report SOB or any respiratory difficulty  - Respiratory Therapy support as indicated  Outcome: Progressing     Problem: GASTROINTESTINAL - ADULT  Goal: Maintains adequate nutritional intake  Description: INTERVENTIONS:  - Monitor percentage of each meal consumed  - Identify factors contributing to decreased intake, treat as appropriate  - Assist with meals as needed  - Monitor I&O, weight, and lab values if indicated  - Obtain nutrition services referral as needed  Outcome: Progressing     Problem: GENITOURINARY - ADULT  Goal: Maintains or returns to baseline urinary function  Description: INTERVENTIONS:  - Assess urinary function  - Encourage oral fluids to ensure adequate hydration if ordered  - Administer IV fluids as ordered to ensure adequate hydration  - Administer ordered medications as needed  - Offer frequent toileting  - Follow urinary retention protocol if ordered  Outcome: Progressing  Goal: Absence of urinary retention  Description:  INTERVENTIONS:  - Assess patient’s ability to void and empty bladder  - Monitor I/O  - Bladder scan as needed  - Discuss with physician/AP medications to alleviate retention as needed  - Discuss catheterization for long term situations as appropriate  Outcome: Progressing     Problem: METABOLIC, FLUID AND ELECTROLYTES - ADULT  Goal: Electrolytes maintained within normal limits  Description: INTERVENTIONS:  - Monitor labs and assess patient for signs and symptoms of electrolyte imbalances  - Administer electrolyte replacement as ordered  - Monitor response to electrolyte replacements, including repeat lab results as appropriate  - Instruct patient on fluid and nutrition as appropriate  Outcome: Progressing  Goal: Fluid balance maintained  Description: INTERVENTIONS:  - Monitor labs   - Monitor I/O and WT  - Instruct patient on fluid and nutrition as appropriate  - Assess for signs & symptoms of volume excess or deficit  Outcome: Progressing     Problem: SKIN/TISSUE INTEGRITY - ADULT  Goal: Incision(s), wounds(s) or drain site(s) healing without S/S of infection  Description: INTERVENTIONS  - Assess and document dressing, incision, wound bed, drain sites and surrounding tissue  - Provide patient and family education  - Perform skin care/dressing changes   Outcome: Progressing     Problem: HEMATOLOGIC - ADULT  Goal: Maintains hematologic stability  Description: INTERVENTIONS  - Assess for signs and symptoms of bleeding or hemorrhage  - Monitor labs  - Administer supportive blood products/factors as ordered and appropriate  Outcome: Progressing     Problem: MUSCULOSKELETAL - ADULT  Goal: Maintain or return mobility to safest level of function  Description: INTERVENTIONS:  - Assess patient's ability to carry out ADLs; assess patient's baseline for ADL function and identify physical deficits which impact ability to perform ADLs (bathing, care of mouth/teeth, toileting, grooming, dressing, etc.)  - Assess/evaluate cause of  self-care deficits   - Assess range of motion  - Assess patient's mobility  - Assess patient's need for assistive devices and provide as appropriate  - Encourage maximum independence but intervene and supervise when necessary  - Involve family in performance of ADLs  - Assess for home care needs following discharge   - Consider OT consult to assist with ADL evaluation and planning for discharge  - Provide patient education as appropriate  Outcome: Progressing

## 2024-09-20 NOTE — PROGRESS NOTES
Progress Note - Hospitalist   Name: Yoni Castillo 59 y.o. male I MRN: [id number]  Unit/Bed#: E5 -01 I Date of Admission: 2024   Date of Service: 2024 I Hospital Day: 8     Assessment & Plan  Essential hypertension  Continue carvedilol lisinopril and HCTZ  BP currently controlled  Type 2 diabetes mellitus with chronic kidney disease, without long-term current use of insulin ([facility])  Refuses sliding scale insulin  Hold Jardiance, resume on discharge  Continue diabetic diet    Neuropathy  Patient currently on several vitamins including vitamin C, garlic capsule, vitamin EE for his peripheral neuropathy.    Chronic kidney disease, stage 3 ([facility])  Renal functions currently stable  Dog bite  Managed as above     Cellulitis of hand, right  Dog bite of right hand with swelling and edema  S/p bedside I&D by ortho 09/15  Without significant improvement, underwent I&D in OR on   Wound cultures from I&D on 09/15 growing staph coag negative  Continue IV antibiotics with Unasyn, awaiting OR cultures  Staghorn calculus  Diagnosed on previous ED  Urology consulted, recommend outpatient follow up on 10/10 to further discuss  Tenosynovitis  As above in right hand cellulitis  Obesity (BMI 30-39.9)      VTE Pharmacologic Prophylaxis:   Pharmacologic: Enoxaparin (Lovenox)  Mechanical VTE Prophylaxis in Place: Yes    Current Length of Stay: 8 day(s)    Current Patient Status: Inpatient   Certification Statement: The patient will continue to require additional inpatient hospital stay due to pending cultures    Discharge Plan: 24-48 hours    Code Status: Level 1 - Full Code      Subjective:   Pain currently controlled.  Discuss hospital course while awaiting cultures.    Objective:     Vitals:   Temp (24hrs), Av.3 °F (36.8 °C), Min:97.8 °F (36.6 °C), Max:98.8 °F (37.1 °C)    Temp:  [97.8 °F (36.6 °C)-98.8 °F (37.1 °C)] 97.8 °F (36.6 °C)  HR:  [69-81] 69  Resp:  [16-20] 16  BP: (132-154)/(78-92) 154/92  SpO2:  [93 %-95  %] 94 %  Body mass index is 37.37 kg/m².     Input and Output Summary (last 24 hours):       Intake/Output Summary (Last 24 hours) at 9/20/2024 1236  Last data filed at 9/20/2024 0854  Gross per 24 hour   Intake 1980 ml   Output --   Net 1980 ml       Physical Exam:     Physical Exam  Vitals and nursing note reviewed.   Constitutional:       Appearance: Normal appearance. He is obese.   HENT:      Head: Normocephalic and atraumatic.   Eyes:      Conjunctiva/sclera: Conjunctivae normal.   Cardiovascular:      Rate and Rhythm: Normal rate.   Pulmonary:      Effort: Pulmonary effort is normal.      Breath sounds: No wheezing.   Abdominal:      General: Bowel sounds are normal. There is no distension.      Palpations: Abdomen is soft.   Musculoskeletal:         General: No swelling. Normal range of motion.   Skin:     General: Skin is warm and dry.      Comments: Right hand swelling, wrapped in dressing   Neurological:      Mental Status: He is alert. Mental status is at baseline.         Additional Data:     Labs:    Results from last 7 days   Lab Units 09/20/24  0530   WBC Thousand/uL 4.80   HEMOGLOBIN g/dL 10.8*   HEMATOCRIT % 33.1*   PLATELETS Thousands/uL 218   SEGS PCT % 59   LYMPHO PCT % 24   MONO PCT % 12   EOS PCT % 4     Results from last 7 days   Lab Units 09/20/24  0530   SODIUM mmol/L 135   POTASSIUM mmol/L 3.9   CHLORIDE mmol/L 102   CO2 mmol/L 26   BUN mg/dL 22   CREATININE mg/dL 1.41*   ANION GAP mmol/L 7   CALCIUM mg/dL 8.6   ALBUMIN g/dL 3.7   TOTAL BILIRUBIN mg/dL 0.27   ALK PHOS U/L 45   ALT U/L 17   AST U/L 18   GLUCOSE RANDOM mg/dL 124         Results from last 7 days   Lab Units 09/20/24  1111 09/20/24  0735 09/19/24  2129 09/19/24  1554 09/19/24  1110 09/19/24  0734 09/18/24  2116 09/18/24  1550 09/18/24  1106 09/18/24  0742 09/17/24  2031 09/17/24  1547   POC GLUCOSE mg/dl 187* 124 146* 176* 166* 108 129 179* 191* 174* 96 110                   * I Have Reviewed All Lab Data Listed Above.  *  Additional Pertinent Lab Tests Reviewed: All Labs For Current Hospital Admission Reviewed    Mobility:  Basic Mobility Inpatient Raw Score: 24  -[facility] Goal: 8: Walk 250 feet or more  -HL Achieved: 8: Walk 250 feet ot more    Lines:     Invasive Devices       Peripheral Intravenous Line  Duration             Peripheral IV 09/19/24 Left;Ventral (anterior) Forearm <1 day                       Imaging:    Imaging Reports Reviewed Today Include:     XR hand 3+ views RIGHT    Result Date: 9/11/2024  Impression: No acute osseous abnormality. Computerized Assisted Algorithm (CAA) may have been used to analyze all applicable images. Workstation performed: [id number]     CT upper extremity w contrast right    Result Date: 9/11/2024  Impression: Mild swelling of the volar wrist in keeping with provided history of dog bite. No fluid collection. No soft tissue gas. Workstation performed: [id number]        Recent Cultures (last 7 days):     Results from last 7 days   Lab Units 09/17/24  1924 09/17/24  1923 09/17/24  1918 09/15/24  1117   GRAM STAIN RESULT  1+ Polys  No organisms seen Rare Disintegrating polys  No organisms seen No Polys or Bacteria seen No Polys or Bacteria seen   WOUND CULTURE  No growth No growth  --   --        Last 24 Hours Medication List:   Current Facility-Administered Medications   Medication Dose Route Frequency Provider Last Rate    acetaminophen  975 mg Oral Q6H WILTON Yesenia Carrasquillo PA-C      aluminum-magnesium hydroxide-simethicone  30 mL Oral Q4H PRN RONNY Blanchard-KAREN      ampicillin-sulbactam  3 g Intravenous Q6H RONNY Blanchard-KAREN 3 g (09/20/24 1147)    vitamin C  1,000 mg Oral BID Yesenia Carrasquillo PA-C      carvedilol  25 mg Oral BID With Meals Yesenia Carrasquillo PA-C      enoxaparin  40 mg Subcutaneous Daily Yesenia Carrasquillo PA-C      hydroCHLOROthiazide  12.5 mg Oral BID Ham Sheehan MD      lidocaine  1 patch Topical Daily Yesenia Carrasquillo PA-C      lisinopril  20 mg Oral BID Ham Sheehan MD       ondansetron  4 mg Intravenous Q4H PRN Yesenia Carrasquillo PA-C      oxyCODONE  10 mg Oral Q6H PRN Ham Sheehan MD      oxyCODONE  5 mg Oral Q6H PRN Ham Sheehan MD      senna  1 tablet Oral BID Yesenia Carrasquillo PA-C      vitamin E (tocopherol)  400 Units Oral BID Yesenia Carrasquillo PA-C          Today, Patient Was Seen By: Ham Sheehan MD    ** Please Note: Dictation voice to text software may have been used in the creation of this document. **

## 2024-09-20 NOTE — ASSESSMENT & PLAN NOTE
Secondary to R hand dog bite with progressing cellulitis. Patient failed to improve on antibiotic alone and was ultimately taken to the OR on 9/17/2024. OR cultures without growth.  Peripheral culture from 9/15/2024 only with broth growth of coagulase-negative staph, likely insignificant.  He has remained on Unasyn and is otherwise hemodynamically stable. Given his clinical improvement, I anticipate transitioning him to oral Augmentin pending his final culture results. Given tendon involvement would recommend 21 days of antibiotic treatment after surgical intervention.  -continue IV unasyn for now  -if no different/resistant growth on final OR cultures, patient should transition to PO Augmentin, 875/125mg BID, through 10/8/2024 to finish a 21 day post-op treatment course   -monitor CBCD and BMP  -follow up final OR cultures  -monitor vitals  -serial R hand/wrist exams  -surgical site care per orthopedics  -continue follow up with orthopedics

## 2024-09-20 NOTE — PROGRESS NOTES
Progress Note - Infectious Disease   Name: Yoni Castillo 59 y.o. male I MRN: [id number]  Unit/Bed#: E5 -01 I Date of Admission: 9/11/2024   Date of Service: 9/20/2024 I Hospital Day: 8     Assessment & Plan  Tenosynovitis  Secondary to R hand dog bite with progressing cellulitis. Patient failed to improve on antibiotic alone and was ultimately taken to the OR on 9/17/2024. OR cultures without growth.  Peripheral culture from 9/15/2024 only with broth growth of coagulase-negative staph, likely insignificant.  He has remained on Unasyn and is otherwise hemodynamically stable. Given his clinical improvement, I anticipate transitioning him to oral Augmentin pending his final culture results. Given tendon involvement would recommend 21 days of antibiotic treatment after surgical intervention.  -continue IV unasyn for now  -if no different/resistant growth on final OR cultures, patient should transition to PO Augmentin, 875/125mg BID, through 10/8/2024 to finish a 21 day post-op treatment course   -monitor CBCD and BMP  -follow up final OR cultures  -monitor vitals  -serial R hand/wrist exams  -surgical site care per orthopedics  -continue follow up with orthopedics  Cellulitis of hand, right  Secondary to dog bite. Unfortunately failed to improve on antibiotic alone, likely in the setting of #1.  Now post surgical intervention. He continues to follow with orthopedics.  -antibiotic as above  -serial R hand/wrist exams  -surgical site care per orthopedics  -continue follow up with orthopedics  Dog bite  Initial injury was on the day before admission.  Course now complicated by the above.  Patient up-to-date on tetanus booster, reported and no role for rabies prophylaxis based on injury and animal involved.  -antibiotic as above  Type 2 diabetes mellitus with chronic kidney disease, without long-term current use of insulin (HCC)  Patient's last HbA1c was 8% on 6/12/2024. Elevated blood glucose is risk factor for  wounds and infection.   -recommend tight glycemic control  -blood glucose management per primary service  Chronic kidney disease, stage 3 (HCC)  This can impact antibiotic dosing. Upon review of patient's available medical records it appears his baseline creatinine is approximately 1.3-1.6. Creatinine remains stable this morning.  -monitor creatinine  -dose adjust antibiotic for renal function as needed  -avoid nephrotoxins  Obesity (BMI 30-39.9)  BMI noted to be 37.37.  This can impact antibiotic dosing.   -monitor patient weight and dose adjust antibiotic as needed    Above plan was discussed in detail with patient at the bedside.  Above plan was discussed in detail with SLIM who agrees with plan for ongoing antibiotic treatment.    Antibiotics:  Unasyn 10  Antibiotics 10  Post op #3    Subjective:  Patient reports he's alright this morning. Patient reports at rest the R wrist/hand are feeling pretty good but the pain accelerates quickly with any touch to the surgical site and during soaks. He has no fever, chills, sweats, shakes; no nausea, vomiting, abdominal pain, diarrhea, or dysuria; no cough, shortness of breath, or chest pain. No new symptoms.    Objective:  Vitals:  Temp:  [98.2 °F (36.8 °C)-98.8 °F (37.1 °C)] 98.3 °F (36.8 °C)  HR:  [77-81] 77  Resp:  [18-20] 20  BP: (131-144)/(78-84) 132/78  SpO2:  [93 %-95 %] 93 %  Temp (24hrs), Av.4 °F (36.9 °C), Min:98.2 °F (36.8 °C), Max:98.8 °F (37.1 °C)  Current: Temperature: 98.3 °F (36.8 °C)    Physical Exam:   General Appearance:  Alert, interactive, nontoxic, no acute distress. He appears comfortable sitting on the edge of his bed.   Lungs:   Clear to auscultation bilaterally; no wheezes, rhonchi or rales; respirations unlabored on room air.   Heart:  RRR; no murmur, rub or gallop.   Extremities: Bulky dressing and splint intact to R wrist/hand, no breakthrough dressing, exposed R fingers/thumb warm with brisk capillary refill.   Skin: No new rashes noted  on exposed skin.     Labs, Imaging, & Other studies:   All pertinent labs and imaging studies were personally reviewed  Results from last 7 days   Lab Units 09/20/24  0530 09/19/24  0534 09/18/24  0438   WBC Thousand/uL 4.80 5.65 6.79   HEMOGLOBIN g/dL 10.8* 11.0* 11.7*   PLATELETS Thousands/uL 218 224 225     Results from last 7 days   Lab Units 09/20/24  0530   POTASSIUM mmol/L 3.9   CHLORIDE mmol/L 102   CO2 mmol/L 26   BUN mg/dL 22   CREATININE mg/dL 1.41*   EGFR ml/min/1.73sq m 53   CALCIUM mg/dL 8.6   AST U/L 18   ALT U/L 17   ALK PHOS U/L 45     Results from last 7 days   Lab Units 09/17/24 1924 09/17/24  1923 09/17/24  1918 09/15/24  1117   GRAM STAIN RESULT  1+ Polys  No organisms seen Rare Disintegrating polys  No organisms seen No Polys or Bacteria seen No Polys or Bacteria seen   WOUND CULTURE  No growth No growth  --   --

## 2024-09-21 VITALS
SYSTOLIC BLOOD PRESSURE: 162 MMHG | TEMPERATURE: 98.1 F | WEIGHT: 275.57 LBS | RESPIRATION RATE: 16 BRPM | OXYGEN SATURATION: 94 % | DIASTOLIC BLOOD PRESSURE: 92 MMHG | HEIGHT: 72 IN | HEART RATE: 64 BPM | BODY MASS INDEX: 37.33 KG/M2

## 2024-09-21 LAB
ANION GAP SERPL CALCULATED.3IONS-SCNC: 7 MMOL/L (ref 4–13)
BACTERIA SPEC ANAEROBE CULT: NO GROWTH
BACTERIA TISS AEROBE CULT: NO GROWTH
BACTERIA WND AEROBE CULT: NO GROWTH
BACTERIA WND AEROBE CULT: NO GROWTH
BUN SERPL-MCNC: 23 MG/DL (ref 5–25)
CALCIUM SERPL-MCNC: 8.7 MG/DL (ref 8.4–10.2)
CHLORIDE SERPL-SCNC: 102 MMOL/L (ref 96–108)
CO2 SERPL-SCNC: 28 MMOL/L (ref 21–32)
CREAT SERPL-MCNC: 1.47 MG/DL (ref 0.6–1.3)
ERYTHROCYTE [DISTWIDTH] IN BLOOD BY AUTOMATED COUNT: 13.4 % (ref 11.6–15.1)
GFR SERPL CREATININE-BSD FRML MDRD: 50 ML/MIN/1.73SQ M
GLUCOSE SERPL-MCNC: 109 MG/DL (ref 65–140)
GLUCOSE SERPL-MCNC: 110 MG/DL (ref 65–140)
GLUCOSE SERPL-MCNC: 190 MG/DL (ref 65–140)
GRAM STN SPEC: NORMAL
HCT VFR BLD AUTO: 30.2 % (ref 36.5–49.3)
HGB BLD-MCNC: 9.9 G/DL (ref 12–17)
MCH RBC QN AUTO: 31 PG (ref 26.8–34.3)
MCHC RBC AUTO-ENTMCNC: 32.8 G/DL (ref 31.4–37.4)
MCV RBC AUTO: 95 FL (ref 82–98)
PLATELET # BLD AUTO: 239 THOUSANDS/UL (ref 149–390)
PMV BLD AUTO: 9.8 FL (ref 8.9–12.7)
POTASSIUM SERPL-SCNC: 3.8 MMOL/L (ref 3.5–5.3)
RBC # BLD AUTO: 3.19 MILLION/UL (ref 3.88–5.62)
SODIUM SERPL-SCNC: 137 MMOL/L (ref 135–147)
WBC # BLD AUTO: 5.68 THOUSAND/UL (ref 4.31–10.16)

## 2024-09-21 PROCEDURE — 85027 COMPLETE CBC AUTOMATED: CPT | Performed by: STUDENT IN AN ORGANIZED HEALTH CARE EDUCATION/TRAINING PROGRAM

## 2024-09-21 PROCEDURE — 80048 BASIC METABOLIC PNL TOTAL CA: CPT | Performed by: STUDENT IN AN ORGANIZED HEALTH CARE EDUCATION/TRAINING PROGRAM

## 2024-09-21 PROCEDURE — 99024 POSTOP FOLLOW-UP VISIT: CPT | Performed by: PHYSICIAN ASSISTANT

## 2024-09-21 PROCEDURE — 99239 HOSP IP/OBS DSCHRG MGMT >30: CPT | Performed by: STUDENT IN AN ORGANIZED HEALTH CARE EDUCATION/TRAINING PROGRAM

## 2024-09-21 PROCEDURE — 82948 REAGENT STRIP/BLOOD GLUCOSE: CPT

## 2024-09-21 RX ORDER — OXYCODONE HYDROCHLORIDE 10 MG/1
10 TABLET ORAL EVERY 8 HOURS PRN
Qty: 15 TABLET | Refills: 0 | Status: SHIPPED | OUTPATIENT
Start: 2024-09-21 | End: 2024-09-26 | Stop reason: SDUPTHER

## 2024-09-21 RX ADMIN — AMPICILLIN SODIUM AND SULBACTAM SODIUM 3 G: 2; 1 INJECTION, POWDER, FOR SOLUTION INTRAMUSCULAR; INTRAVENOUS at 06:28

## 2024-09-21 RX ADMIN — LISINOPRIL 20 MG: 20 TABLET ORAL at 08:02

## 2024-09-21 RX ADMIN — HYDROCHLOROTHIAZIDE 12.5 MG: 12.5 TABLET ORAL at 08:02

## 2024-09-21 RX ADMIN — CARVEDILOL 25 MG: 25 TABLET, FILM COATED ORAL at 08:02

## 2024-09-21 RX ADMIN — LIDOCAINE 5% 1 PATCH: 700 PATCH TOPICAL at 08:02

## 2024-09-21 RX ADMIN — Medication 400 UNITS: at 08:48

## 2024-09-21 RX ADMIN — OXYCODONE HYDROCHLORIDE 10 MG: 10 TABLET ORAL at 10:25

## 2024-09-21 RX ADMIN — OXYCODONE HYDROCHLORIDE AND ACETAMINOPHEN 1000 MG: 500 TABLET ORAL at 08:02

## 2024-09-21 RX ADMIN — ACETAMINOPHEN 975 MG: 325 TABLET ORAL at 00:58

## 2024-09-21 RX ADMIN — ACETAMINOPHEN 975 MG: 325 TABLET ORAL at 12:32

## 2024-09-21 RX ADMIN — AMOXICILLIN AND CLAVULANATE POTASSIUM 1 TABLET: 875; 125 TABLET, FILM COATED ORAL at 12:32

## 2024-09-21 RX ADMIN — SENNOSIDES 8.6 MG: 8.6 TABLET, FILM COATED ORAL at 08:02

## 2024-09-21 RX ADMIN — AMPICILLIN SODIUM AND SULBACTAM SODIUM 3 G: 2; 1 INJECTION, POWDER, FOR SOLUTION INTRAMUSCULAR; INTRAVENOUS at 00:57

## 2024-09-21 NOTE — PLAN OF CARE
Problem: PAIN - ADULT  Goal: Verbalizes/displays adequate comfort level or baseline comfort level  Description: Interventions:  - Encourage patient to monitor pain and request assistance  - Assess pain using appropriate pain scale  - Administer analgesics based on type and severity of pain and evaluate response  - Implement non-pharmacological measures as appropriate and evaluate response  - Consider cultural and social influences on pain and pain management  - Notify physician/advanced practitioner if interventions unsuccessful or patient reports new pain  Outcome: Progressing     Problem: INFECTION - ADULT  Goal: Absence or prevention of progression during hospitalization  Description: INTERVENTIONS:  - Assess and monitor for signs and symptoms of infection  - Monitor lab/diagnostic results  - Monitor all insertion sites, i.e. indwelling lines, tubes, and drains  - Monitor endotracheal if appropriate and nasal secretions for changes in amount and color  - [place] appropriate cooling/warming therapies per order  - Administer medications as ordered  - Instruct and encourage patient and family to use good hand hygiene technique  - Identify and instruct in appropriate isolation precautions for identified infection/condition  Outcome: Progressing     Problem: SAFETY ADULT  Goal: Patient will remain free of falls  Description: INTERVENTIONS:  - Educate patient/family on patient safety including physical limitations  - Instruct patient to call for assistance with activity   - Consult OT/PT to assist with strengthening/mobility   - Keep Call bell within reach  - Keep bed low and locked with side rails adjusted as appropriate  - Keep care items and personal belongings within reach  - Initiate and maintain comfort rounds  - Make Fall Risk Sign visible to staff  - Apply yellow socks and bracelet for high fall risk patients  - Consider moving patient to room near nurses station  Outcome: Progressing  Goal: Maintain or  return to baseline ADL function  Description: INTERVENTIONS:  -  Assess patient's ability to carry out ADLs; assess patient's baseline for ADL function and identify physical deficits which impact ability to perform ADLs (bathing, care of mouth/teeth, toileting, grooming, dressing, etc.)  - Assess/evaluate cause of self-care deficits   - Assess range of motion  - Assess patient's mobility; develop plan if impaired  - Assess patient's need for assistive devices and provide as appropriate  - Encourage maximum independence but intervene and supervise when necessary  - Involve family in performance of ADLs  - Assess for home care needs following discharge   - Consider OT consult to assist with ADL evaluation and planning for discharge  - Provide patient education as appropriate  Outcome: Progressing     Problem: DISCHARGE PLANNING  Goal: Discharge to home or other facility with appropriate resources  Description: INTERVENTIONS:  - Identify barriers to discharge w/patient and caregiver  - Arrange for needed discharge resources and transportation as appropriate  - Identify discharge learning needs (meds, wound care, etc.)  - Arrange for interpretive services to assist at discharge as needed  - Refer to Case Management Department for coordinating discharge planning if the patient needs post-hospital services based on physician/advanced practitioner order or complex needs related to functional status, cognitive ability, or social support system  Outcome: Progressing     Problem: Knowledge Deficit  Goal: Patient/family/caregiver demonstrates understanding of disease process, treatment plan, medications, and discharge instructions  Description: Complete learning assessment and assess knowledge base.  Interventions:  - Provide teaching at level of understanding  - Provide teaching via preferred learning methods  Outcome: Progressing     Problem: CARDIOVASCULAR - ADULT  Goal: Maintains optimal cardiac output and hemodynamic  stability  Description: INTERVENTIONS:  - Monitor I/O, vital signs and rhythm  - Monitor for S/S and trends of decreased cardiac output  - Administer and titrate ordered vasoactive medications to optimize hemodynamic stability  - Assess quality of pulses, skin color and temperature  - Assess for signs of decreased coronary artery perfusion  - Instruct patient to report change in severity of symptoms  Outcome: Progressing     Problem: RESPIRATORY - ADULT  Goal: Achieves optimal ventilation and oxygenation  Description: INTERVENTIONS:  - Assess for changes in respiratory status  - Assess for changes in mentation and behavior  - Position to facilitate oxygenation and minimize respiratory effort  - Oxygen administered by appropriate delivery if ordered  - Initiate smoking cessation education as indicated  - Encourage broncho-pulmonary hygiene including cough, deep breathe, Incentive Spirometry  - Assess the need for suctioning and aspirate as needed  - Assess and instruct to report SOB or any respiratory difficulty  - Respiratory Therapy support as indicated  Outcome: Progressing     Problem: GASTROINTESTINAL - ADULT  Goal: Maintains adequate nutritional intake  Description: INTERVENTIONS:  - Monitor percentage of each meal consumed  - Identify factors contributing to decreased intake, treat as appropriate  - Assist with meals as needed  - Monitor I&O, weight, and lab values if indicated  - Obtain nutrition services referral as needed  Outcome: Progressing     Problem: GENITOURINARY - ADULT  Goal: Maintains or returns to baseline urinary function  Description: INTERVENTIONS:  - Assess urinary function  - Encourage oral fluids to ensure adequate hydration if ordered  - Administer IV fluids as ordered to ensure adequate hydration  - Administer ordered medications as needed  - Offer frequent toileting  - Follow urinary retention protocol if ordered  Outcome: Progressing  Goal: Absence of urinary retention  Description:  INTERVENTIONS:  - Assess patient’s ability to void and empty bladder  - Monitor I/O  - Bladder scan as needed  - Discuss with physician/AP medications to alleviate retention as needed  - Discuss catheterization for long term situations as appropriate  Outcome: Progressing     Problem: METABOLIC, FLUID AND ELECTROLYTES - ADULT  Goal: Electrolytes maintained within normal limits  Description: INTERVENTIONS:  - Monitor labs and assess patient for signs and symptoms of electrolyte imbalances  - Administer electrolyte replacement as ordered  - Monitor response to electrolyte replacements, including repeat lab results as appropriate  - Instruct patient on fluid and nutrition as appropriate  Outcome: Progressing  Goal: Fluid balance maintained  Description: INTERVENTIONS:  - Monitor labs   - Monitor I/O and WT  - Instruct patient on fluid and nutrition as appropriate  - Assess for signs & symptoms of volume excess or deficit  Outcome: Progressing     Problem: SKIN/TISSUE INTEGRITY - ADULT  Goal: Incision(s), wounds(s) or drain site(s) healing without S/S of infection  Description: INTERVENTIONS  - Assess and document dressing, incision, wound bed, drain sites and surrounding tissue  - Provide patient and family education  - Perform skin care/dressing changes   Outcome: Progressing     Problem: HEMATOLOGIC - ADULT  Goal: Maintains hematologic stability  Description: INTERVENTIONS  - Assess for signs and symptoms of bleeding or hemorrhage  - Monitor labs  - Administer supportive blood products/factors as ordered and appropriate  Outcome: Progressing     Problem: MUSCULOSKELETAL - ADULT  Goal: Maintain or return mobility to safest level of function  Description: INTERVENTIONS:  - Assess patient's ability to carry out ADLs; assess patient's baseline for ADL function and identify physical deficits which impact ability to perform ADLs (bathing, care of mouth/teeth, toileting, grooming, dressing, etc.)  - Assess/evaluate cause of  self-care deficits   - Assess range of motion  - Assess patient's mobility  - Assess patient's need for assistive devices and provide as appropriate  - Encourage maximum independence but intervene and supervise when necessary  - Involve family in performance of ADLs  - Assess for home care needs following discharge   - Consider OT consult to assist with ADL evaluation and planning for discharge  - Provide patient education as appropriate  Outcome: Progressing

## 2024-09-21 NOTE — PLAN OF CARE
Problem: PAIN - ADULT  Goal: Verbalizes/displays adequate comfort level or baseline comfort level  Description: Interventions:  - Encourage patient to monitor pain and request assistance  - Assess pain using appropriate pain scale  - Administer analgesics based on type and severity of pain and evaluate response  - Implement non-pharmacological measures as appropriate and evaluate response  - Consider cultural and social influences on pain and pain management  - Notify physician/advanced practitioner if interventions unsuccessful or patient reports new pain  Outcome: Progressing     Problem: INFECTION - ADULT  Goal: Absence or prevention of progression during hospitalization  Description: INTERVENTIONS:  - Assess and monitor for signs and symptoms of infection  - Monitor lab/diagnostic results  - Monitor all insertion sites, i.e. indwelling lines, tubes, and drains  - Monitor endotracheal if appropriate and nasal secretions for changes in amount and color  - [place] appropriate cooling/warming therapies per order  - Administer medications as ordered  - Instruct and encourage patient and family to use good hand hygiene technique  - Identify and instruct in appropriate isolation precautions for identified infection/condition  Outcome: Progressing     Problem: SAFETY ADULT  Goal: Patient will remain free of falls  Description: INTERVENTIONS:  - Educate patient/family on patient safety including physical limitations  - Instruct patient to call for assistance with activity   - Consult OT/PT to assist with strengthening/mobility   - Keep Call bell within reach  - Keep bed low and locked with side rails adjusted as appropriate  - Keep care items and personal belongings within reach  - Initiate and maintain comfort rounds  - Make Fall Risk Sign visible to staff  - Apply yellow socks and bracelet for high fall risk patients  - Consider moving patient to room near nurses station  Outcome: Progressing  Goal: Maintain or  return to baseline ADL function  Description: INTERVENTIONS:  -  Assess patient's ability to carry out ADLs; assess patient's baseline for ADL function and identify physical deficits which impact ability to perform ADLs (bathing, care of mouth/teeth, toileting, grooming, dressing, etc.)  - Assess/evaluate cause of self-care deficits   - Assess range of motion  - Assess patient's mobility; develop plan if impaired  - Assess patient's need for assistive devices and provide as appropriate  - Encourage maximum independence but intervene and supervise when necessary  - Involve family in performance of ADLs  - Assess for home care needs following discharge   - Consider OT consult to assist with ADL evaluation and planning for discharge  - Provide patient education as appropriate  Outcome: Progressing     Problem: DISCHARGE PLANNING  Goal: Discharge to home or other facility with appropriate resources  Description: INTERVENTIONS:  - Identify barriers to discharge w/patient and caregiver  - Arrange for needed discharge resources and transportation as appropriate  - Identify discharge learning needs (meds, wound care, etc.)  - Arrange for interpretive services to assist at discharge as needed  - Refer to Case Management Department for coordinating discharge planning if the patient needs post-hospital services based on physician/advanced practitioner order or complex needs related to functional status, cognitive ability, or social support system  Outcome: Progressing     Problem: Knowledge Deficit  Goal: Patient/family/caregiver demonstrates understanding of disease process, treatment plan, medications, and discharge instructions  Description: Complete learning assessment and assess knowledge base.  Interventions:  - Provide teaching at level of understanding  - Provide teaching via preferred learning methods  Outcome: Progressing     Problem: CARDIOVASCULAR - ADULT  Goal: Maintains optimal cardiac output and hemodynamic  stability  Description: INTERVENTIONS:  - Monitor I/O, vital signs and rhythm  - Monitor for S/S and trends of decreased cardiac output  - Administer and titrate ordered vasoactive medications to optimize hemodynamic stability  - Assess quality of pulses, skin color and temperature  - Assess for signs of decreased coronary artery perfusion  - Instruct patient to report change in severity of symptoms  Outcome: Progressing     Problem: RESPIRATORY - ADULT  Goal: Achieves optimal ventilation and oxygenation  Description: INTERVENTIONS:  - Assess for changes in respiratory status  - Assess for changes in mentation and behavior  - Position to facilitate oxygenation and minimize respiratory effort  - Oxygen administered by appropriate delivery if ordered  - Initiate smoking cessation education as indicated  - Encourage broncho-pulmonary hygiene including cough, deep breathe, Incentive Spirometry  - Assess the need for suctioning and aspirate as needed  - Assess and instruct to report SOB or any respiratory difficulty  - Respiratory Therapy support as indicated  Outcome: Progressing     Problem: GASTROINTESTINAL - ADULT  Goal: Maintains adequate nutritional intake  Description: INTERVENTIONS:  - Monitor percentage of each meal consumed  - Identify factors contributing to decreased intake, treat as appropriate  - Assist with meals as needed  - Monitor I&O, weight, and lab values if indicated  - Obtain nutrition services referral as needed  Outcome: Progressing     Problem: GENITOURINARY - ADULT  Goal: Maintains or returns to baseline urinary function  Description: INTERVENTIONS:  - Assess urinary function  - Encourage oral fluids to ensure adequate hydration if ordered  - Administer IV fluids as ordered to ensure adequate hydration  - Administer ordered medications as needed  - Offer frequent toileting  - Follow urinary retention protocol if ordered  Outcome: Progressing  Goal: Absence of urinary retention  Description:  INTERVENTIONS:  - Assess patient’s ability to void and empty bladder  - Monitor I/O  - Bladder scan as needed  - Discuss with physician/AP medications to alleviate retention as needed  - Discuss catheterization for long term situations as appropriate  Outcome: Progressing     Problem: METABOLIC, FLUID AND ELECTROLYTES - ADULT  Goal: Electrolytes maintained within normal limits  Description: INTERVENTIONS:  - Monitor labs and assess patient for signs and symptoms of electrolyte imbalances  - Administer electrolyte replacement as ordered  - Monitor response to electrolyte replacements, including repeat lab results as appropriate  - Instruct patient on fluid and nutrition as appropriate  Outcome: Progressing  Goal: Fluid balance maintained  Description: INTERVENTIONS:  - Monitor labs   - Monitor I/O and WT  - Instruct patient on fluid and nutrition as appropriate  - Assess for signs & symptoms of volume excess or deficit  Outcome: Progressing     Problem: SKIN/TISSUE INTEGRITY - ADULT  Goal: Incision(s), wounds(s) or drain site(s) healing without S/S of infection  Description: INTERVENTIONS  - Assess and document dressing, incision, wound bed, drain sites and surrounding tissue  - Provide patient and family education  - Perform skin care/dressing changes   Outcome: Progressing     Problem: HEMATOLOGIC - ADULT  Goal: Maintains hematologic stability  Description: INTERVENTIONS  - Assess for signs and symptoms of bleeding or hemorrhage  - Monitor labs  - Administer supportive blood products/factors as ordered and appropriate  Outcome: Progressing     Problem: MUSCULOSKELETAL - ADULT  Goal: Maintain or return mobility to safest level of function  Description: INTERVENTIONS:  - Assess patient's ability to carry out ADLs; assess patient's baseline for ADL function and identify physical deficits which impact ability to perform ADLs (bathing, care of mouth/teeth, toileting, grooming, dressing, etc.)  - Assess/evaluate cause of  self-care deficits   - Assess range of motion  - Assess patient's mobility  - Assess patient's need for assistive devices and provide as appropriate  - Encourage maximum independence but intervene and supervise when necessary  - Involve family in performance of ADLs  - Assess for home care needs following discharge   - Consider OT consult to assist with ADL evaluation and planning for discharge  - Provide patient education as appropriate  Outcome: Progressing

## 2024-09-21 NOTE — PLAN OF CARE
Problem: PAIN - ADULT  Goal: Verbalizes/displays adequate comfort level or baseline comfort level  Description: Interventions:  - Encourage patient to monitor pain and request assistance  - Assess pain using appropriate pain scale  - Administer analgesics based on type and severity of pain and evaluate response  - Implement non-pharmacological measures as appropriate and evaluate response  - Consider cultural and social influences on pain and pain management  - Notify physician/advanced practitioner if interventions unsuccessful or patient reports new pain  Outcome: Progressing     Problem: INFECTION - ADULT  Goal: Absence or prevention of progression during hospitalization  Description: INTERVENTIONS:  - Assess and monitor for signs and symptoms of infection  - Monitor lab/diagnostic results  - Monitor all insertion sites, i.e. indwelling lines, tubes, and drains  - Monitor endotracheal if appropriate and nasal secretions for changes in amount and color  - [place] appropriate cooling/warming therapies per order  - Administer medications as ordered  - Instruct and encourage patient and family to use good hand hygiene technique  - Identify and instruct in appropriate isolation precautions for identified infection/condition  Outcome: Progressing     Problem: SAFETY ADULT  Goal: Patient will remain free of falls  Description: INTERVENTIONS:  - Educate patient/family on patient safety including physical limitations  - Instruct patient to call for assistance with activity   - Consult OT/PT to assist with strengthening/mobility   - Keep Call bell within reach  - Keep bed low and locked with side rails adjusted as appropriate  - Keep care items and personal belongings within reach  - Initiate and maintain comfort rounds  - Make Fall Risk Sign visible to staff  - Apply yellow socks and bracelet for high fall risk patients  - Consider moving patient to room near nurses station  Outcome: Progressing  Goal: Maintain or  return to baseline ADL function  Description: INTERVENTIONS:  -  Assess patient's ability to carry out ADLs; assess patient's baseline for ADL function and identify physical deficits which impact ability to perform ADLs (bathing, care of mouth/teeth, toileting, grooming, dressing, etc.)  - Assess/evaluate cause of self-care deficits   - Assess range of motion  - Assess patient's mobility; develop plan if impaired  - Assess patient's need for assistive devices and provide as appropriate  - Encourage maximum independence but intervene and supervise when necessary  - Involve family in performance of ADLs  - Assess for home care needs following discharge   - Consider OT consult to assist with ADL evaluation and planning for discharge  - Provide patient education as appropriate  Outcome: Progressing     Problem: DISCHARGE PLANNING  Goal: Discharge to home or other facility with appropriate resources  Description: INTERVENTIONS:  - Identify barriers to discharge w/patient and caregiver  - Arrange for needed discharge resources and transportation as appropriate  - Identify discharge learning needs (meds, wound care, etc.)  - Arrange for interpretive services to assist at discharge as needed  - Refer to Case Management Department for coordinating discharge planning if the patient needs post-hospital services based on physician/advanced practitioner order or complex needs related to functional status, cognitive ability, or social support system  Outcome: Progressing     Problem: Knowledge Deficit  Goal: Patient/family/caregiver demonstrates understanding of disease process, treatment plan, medications, and discharge instructions  Description: Complete learning assessment and assess knowledge base.  Interventions:  - Provide teaching at level of understanding  - Provide teaching via preferred learning methods  Outcome: Progressing     Problem: CARDIOVASCULAR - ADULT  Goal: Maintains optimal cardiac output and hemodynamic  stability  Description: INTERVENTIONS:  - Monitor I/O, vital signs and rhythm  - Monitor for S/S and trends of decreased cardiac output  - Administer and titrate ordered vasoactive medications to optimize hemodynamic stability  - Assess quality of pulses, skin color and temperature  - Assess for signs of decreased coronary artery perfusion  - Instruct patient to report change in severity of symptoms  Outcome: Progressing     Problem: RESPIRATORY - ADULT  Goal: Achieves optimal ventilation and oxygenation  Description: INTERVENTIONS:  - Assess for changes in respiratory status  - Assess for changes in mentation and behavior  - Position to facilitate oxygenation and minimize respiratory effort  - Oxygen administered by appropriate delivery if ordered  - Initiate smoking cessation education as indicated  - Encourage broncho-pulmonary hygiene including cough, deep breathe, Incentive Spirometry  - Assess the need for suctioning and aspirate as needed  - Assess and instruct to report SOB or any respiratory difficulty  - Respiratory Therapy support as indicated  Outcome: Progressing     Problem: GASTROINTESTINAL - ADULT  Goal: Maintains adequate nutritional intake  Description: INTERVENTIONS:  - Monitor percentage of each meal consumed  - Identify factors contributing to decreased intake, treat as appropriate  - Assist with meals as needed  - Monitor I&O, weight, and lab values if indicated  - Obtain nutrition services referral as needed  Outcome: Progressing     Problem: GENITOURINARY - ADULT  Goal: Maintains or returns to baseline urinary function  Description: INTERVENTIONS:  - Assess urinary function  - Encourage oral fluids to ensure adequate hydration if ordered  - Administer IV fluids as ordered to ensure adequate hydration  - Administer ordered medications as needed  - Offer frequent toileting  - Follow urinary retention protocol if ordered  Outcome: Progressing  Goal: Absence of urinary retention  Description:  INTERVENTIONS:  - Assess patient’s ability to void and empty bladder  - Monitor I/O  - Bladder scan as needed  - Discuss with physician/AP medications to alleviate retention as needed  - Discuss catheterization for long term situations as appropriate  Outcome: Progressing     Problem: METABOLIC, FLUID AND ELECTROLYTES - ADULT  Goal: Electrolytes maintained within normal limits  Description: INTERVENTIONS:  - Monitor labs and assess patient for signs and symptoms of electrolyte imbalances  - Administer electrolyte replacement as ordered  - Monitor response to electrolyte replacements, including repeat lab results as appropriate  - Instruct patient on fluid and nutrition as appropriate  Outcome: Progressing  Goal: Fluid balance maintained  Description: INTERVENTIONS:  - Monitor labs   - Monitor I/O and WT  - Instruct patient on fluid and nutrition as appropriate  - Assess for signs & symptoms of volume excess or deficit  Outcome: Progressing     Problem: SKIN/TISSUE INTEGRITY - ADULT  Goal: Incision(s), wounds(s) or drain site(s) healing without S/S of infection  Description: INTERVENTIONS  - Assess and document dressing, incision, wound bed, drain sites and surrounding tissue  - Provide patient and family education  - Perform skin care/dressing changes   Outcome: Progressing     Problem: HEMATOLOGIC - ADULT  Goal: Maintains hematologic stability  Description: INTERVENTIONS  - Assess for signs and symptoms of bleeding or hemorrhage  - Monitor labs  - Administer supportive blood products/factors as ordered and appropriate  Outcome: Progressing     Problem: MUSCULOSKELETAL - ADULT  Goal: Maintain or return mobility to safest level of function  Description: INTERVENTIONS:  - Assess patient's ability to carry out ADLs; assess patient's baseline for ADL function and identify physical deficits which impact ability to perform ADLs (bathing, care of mouth/teeth, toileting, grooming, dressing, etc.)  - Assess/evaluate cause of  self-care deficits   - Assess range of motion  - Assess patient's mobility  - Assess patient's need for assistive devices and provide as appropriate  - Encourage maximum independence but intervene and supervise when necessary  - Involve family in performance of ADLs  - Assess for home care needs following discharge   - Consider OT consult to assist with ADL evaluation and planning for discharge  - Provide patient education as appropriate  Outcome: Progressing

## 2024-09-21 NOTE — ASSESSMENT & PLAN NOTE
Lab Results   Component Value Date    EGFR 50 09/21/2024    EGFR 53 09/20/2024    EGFR 45 09/19/2024    CREATININE 1.47 (H) 09/21/2024    CREATININE 1.41 (H) 09/20/2024    CREATININE 1.60 (H) 09/19/2024   -Management per primary team

## 2024-09-21 NOTE — PROGRESS NOTES
Progress Note - Orthopedics   Name: Yoni Castillo 59 y.o. male I MRN: [id number]  Unit/Bed#: E5 -01 I Date of Admission: 9/11/2024   Date of Service: 9/21/2024 I Hospital Day: 9     Assessment & Plan  Cellulitis of hand, right  POD 4 s/p right hand and wrist I&D with extensor compartment tenosynovectomy with Dr. Mccarthy on 9/17/24.    Plan  Pain Control  Continue warm soapy soaks to the hand and then re-wrap with xeroform, gauze, and ACE bandages with splint underneath.    Partial WB RUE (<5 lbs)  Antibiotics per infectious disease. Final intra-operative cultures pending.  Continue Strict elevation to the RUE and ice to the area  Medical management per primary   Ortho will continue to follow. No additional surgical intervention planned at this time.  F/u with Shari in 1 week as outpt once discharged.  Dog bite  -Plan as above.  Essential hypertension  -Management per primary team    Type 2 diabetes mellitus with chronic kidney disease, without long-term current use of insulin ([facility])  Lab Results   Component Value Date    HGBA1C 8.0 (H) 06/12/2024       Recent Labs     09/20/24  1111 09/20/24  1614 09/20/24  2104 09/21/24  0731   POCGLU 187* 171* 152* 110       Blood Sugar Average: Last 72 hrs:  (P) 154.6116237114041412    -Management per primary team encourage keeping blood glucose levels less than 180 to help control this infection.  Currently well-managed.  Neuropathy  -Management per primary team  Chronic kidney disease, stage 3 ([facility])  Lab Results   Component Value Date    EGFR 50 09/21/2024    EGFR 53 09/20/2024    EGFR 45 09/19/2024    CREATININE 1.47 (H) 09/21/2024    CREATININE 1.41 (H) 09/20/2024    CREATININE 1.60 (H) 09/19/2024   -Management per primary team  Staghorn calculus    Tenosynovitis    Obesity (BMI 30-39.9)      Ok for discharge from Orthopedics service perspective. Pending final antibiotic recs from ID once intra-op cultures are finalized.    History of Present Illness   59 y.o.male seen  and evaluated this am. Notes continued improvement with regards to his right hand and wrist. He has been performing warm soapy soaks. He does note swelling and stiffness in his hand and digits which is improving. No numbness/tingling. No fever/chills.    Objective      Temp:  [98.1 °F (36.7 °C)-98.8 °F (37.1 °C)] 98.1 °F (36.7 °C)  HR:  [64-72] 64  Resp:  [16] 16  BP: (160-162)/(92-95) 162/92  O2 Device: None (Room air)          I/O         09/19 0701 09/20 0700 09/20 0701 09/21 0700 09/21 0701 09/22 0700    P.O. 1980 1917     I.V. (mL/kg) 40 (0.3)      IV Piggyback 100      Total Intake(mL/kg) 2120 (17) 1917 (15.3)     Net +2120 +1917                  Lines/Drains/Airways       Active Status       None                  Physical Exam Right Hand/wrist:  Incisions C/D/I without erythema or drainage.  No palpable fluctuance or collection.  Edema/swelling appreciated about the hand and digits.  Diffuse generalized TTP  Digital ROM intact but limited due to swelling with minimal pain.  Limited composite fist formation with minimal pain.  Motor intact anterior interosseous nerve/posterior interosseous nerve/median/radial/ulnar nerve distributions  2+ radial pulse      Lab Results: I have reviewed the following results: CBC/BMP:   .     09/21/24  0601   WBC 5.68   HGB 9.9*   HCT 30.2*      SODIUM 137   K 3.8      CO2 28   BUN 23   CREATININE 1.47*   GLUC 109      Recent Labs     09/19/24  0534 09/20/24  0530 09/21/24  0601   WBC 5.65 4.80 5.68   HGB 11.0* 10.8* 9.9*   HCT 33.5* 33.1* 30.2*    218 239   BUN 22 22 23   CREATININE 1.60* 1.41* 1.47*   \

## 2024-09-21 NOTE — ASSESSMENT & PLAN NOTE
Lab Results   Component Value Date    HGBA1C 8.0 (H) 06/12/2024       Recent Labs     09/20/24  1111 09/20/24  1614 09/20/24  2104 09/21/24  0731   POCGLU 187* 171* 152* 110       Blood Sugar Average: Last 72 hrs:  (P) [id number]    -Management per primary team encourage keeping blood glucose levels less than 180 to help control this infection.  Currently well-managed.

## 2024-09-21 NOTE — DISCHARGE SUMMARY
Discharge Summary - Hospitalist   Name: Yoni Castillo 59 y.o. male I MRN: [id number]  Unit/Bed#: E5 -01 I Date of Admission: 9/11/2024   Date of Service: 9/21/2024 I Hospital Day: 9     Assessment & Plan  Essential hypertension  Continue carvedilol lisinopril and HCTZ  BP currently controlled  Type 2 diabetes mellitus with chronic kidney disease, without long-term current use of insulin ([facility])  Refuses sliding scale insulin  Hold Jardiance, resume on discharge  Continue diabetic diet    Neuropathy  Patient currently on several vitamins including vitamin C, garlic capsule, vitamin EE for his peripheral neuropathy.    Chronic kidney disease, stage 3 ([facility])  Renal functions currently stable  Dog bite  Managed as above     Cellulitis of hand, right  Dog bite of right hand with swelling and edema  S/p bedside I&D by ortho 09/15  Without significant improvement, underwent I&D in OR on 09/18  Wound cultures from I&D on 09/15 growing staph coag negative  Continue IV antibiotics with Unasyn, cultures were negative  Discussed with ID recommend 3-week total course of antibiotics  Patient does have some report of numbness and tingling, concern for blood loss.  Discussed with Ortho and suspect likely due to swelling.  Recommended follow-up with orthopedics hand surgery in 1 week.  Staghorn calculus  Diagnosed on previous ED  Urology consulted, recommend outpatient follow up on 10/10 to further discuss  Tenosynovitis  As above in right hand cellulitis  Obesity (BMI 30-39.9)       Discharging Physician / Practitioner: Ham Sheehan MD  PCP: Judd Ji MD  Admission Date:   Admission Orders (From admission, onward)       Ordered        09/12/24 1107  INPATIENT ADMISSION  Once            09/11/24 1755  Place in Observation  Once                          Discharge Date: 09/21/24    Medical Problems       Resolved Problems  Date Reviewed: 8/1/2024   None         Consultations During Hospital  Stay:  Orthopedics  Infectious Disease    Procedures Performed:   S/P Incision of Drainage of Right hand with extensor compartment tenosynovectomy    Significant Findings / Test Results:   XR hand 3+ views RIGHT    Result Date: 9/11/2024  Impression: No acute osseous abnormality. Computerized Assisted Algorithm (CAA) may have been used to analyze all applicable images. Workstation performed: [id number]     CT upper extremity w contrast right    Result Date: 9/11/2024  Impression: Mild swelling of the volar wrist in keeping with provided history of dog bite. No fluid collection. No soft tissue gas. Workstation performed: [id number]        Incidental Findings:   none     Test Results Pending at Discharge (will require follow up):   none     Outpatient Tests Requested:  Follow-up with orthopedic surgery in 1 week    Complications:  none    Reason for Admission: Right hand dog bite    Hospital Course:     Yoni Castillo is a 59 y.o. male patient who originally presented to the hospital on 9/11/2024 due to right hand dog bite.  Patient initially had swelling, erythema with CT imaging showing swelling of the volar wrist without evidence of fluid collection or gas.  Empirically start IV antibiotics Ortho orthopedics consulted.  As swelling and erythema did not improve, there was concern for possible tenosynovitis.  Bedside incision and drainage was done and patient was monitored for additional several days.  Symptoms did not improve with continued pain and swelling that Ortho decided on formal I&D of right hand.  Tolerated procedure well, noted murky fluid and inflamed tenosynovium.  Infectious disease was consulted and recommended continue antibiotics.  Intraoperative cultures were negative, ID recommends 3-week total course of antibiotics after incision and drainage.  Discharged home to follow-up with Ortho hand surgery in 1 week.    Please see above list of diagnoses and related plan for additional information.      Condition at Discharge: fair     Discharge Day Visit / Exam:     Subjective:    No events overnight.  Pain is currently controlled.  Does report some numbness and tingling around his fingers.  Denies any fevers, chills, chest pain, shortness of breath.  Vitals: Blood Pressure: 162/92 (09/21/24 0731)  Pulse: 64 (09/21/24 0731)  Temperature: 98.1 °F (36.7 °C) (09/21/24 0731)  Temp Source: Oral (09/21/24 0731)  Respirations: 16 (09/21/24 0731)  Height: 6' (182.9 cm) (09/11/24 1954)  Weight - Scale: 125 kg (275 lb 9.2 oz) (09/11/24 1954)  SpO2: 94 % (09/21/24 0731)  Exam:   Physical Exam  Vitals and nursing note reviewed.   Constitutional:       Appearance: Normal appearance. He is obese.   HENT:      Head: Normocephalic and atraumatic.   Eyes:      Conjunctiva/sclera: Conjunctivae normal.   Cardiovascular:      Rate and Rhythm: Normal rate.   Pulmonary:      Effort: Pulmonary effort is normal.      Breath sounds: No wheezing.   Abdominal:      General: Bowel sounds are normal. There is no distension.      Palpations: Abdomen is soft.   Musculoskeletal:         General: No swelling. Normal range of motion.   Skin:     General: Skin is warm and dry.      Comments: Right hand wrapped in dressing   Neurological:      Mental Status: He is alert. Mental status is at baseline.         Discharge instructions/Information to patient and family:   See after visit summary for information provided to patient and family.      Provisions for Follow-Up Care:  See after visit summary for information related to follow-up care and any pertinent home health orders.      Disposition:     Home     Discharge Statement:  I spent 40 minutes discharging the patient. This time was spent on the day of discharge. I had direct contact with the patient on the day of discharge. Greater than 50% of the total time was spent examining patient, answering all patient questions, arranging and discussing plan of care with patient as well as directly  providing post-discharge instructions.  Additional time then spent on discharge activities.    Discharge Medications:  See after visit summary for reconciled discharge medications provided to patient and family.      ** Please Note: This note has been constructed using a voice recognition system **

## 2024-09-21 NOTE — PLAN OF CARE
Problem: PAIN - ADULT  Goal: Verbalizes/displays adequate comfort level or baseline comfort level  Description: Interventions:  - Encourage patient to monitor pain and request assistance  - Assess pain using appropriate pain scale  - Administer analgesics based on type and severity of pain and evaluate response  - Implement non-pharmacological measures as appropriate and evaluate response  - Consider cultural and social influences on pain and pain management  - Notify physician/advanced practitioner if interventions unsuccessful or patient reports new pain  Outcome: Adequate for Discharge     Problem: INFECTION - ADULT  Goal: Absence or prevention of progression during hospitalization  Description: INTERVENTIONS:  - Assess and monitor for signs and symptoms of infection  - Monitor lab/diagnostic results  - Monitor all insertion sites, i.e. indwelling lines, tubes, and drains  - Monitor endotracheal if appropriate and nasal secretions for changes in amount and color  - [place] appropriate cooling/warming therapies per order  - Administer medications as ordered  - Instruct and encourage patient and family to use good hand hygiene technique  - Identify and instruct in appropriate isolation precautions for identified infection/condition  Outcome: Adequate for Discharge     Problem: SAFETY ADULT  Goal: Patient will remain free of falls  Description: INTERVENTIONS:  - Educate patient/family on patient safety including physical limitations  - Instruct patient to call for assistance with activity   - Consult OT/PT to assist with strengthening/mobility   - Keep Call bell within reach  - Keep bed low and locked with side rails adjusted as appropriate  - Keep care items and personal belongings within reach  - Initiate and maintain comfort rounds  - Make Fall Risk Sign visible to staff  - Apply yellow socks and bracelet for high fall risk patients  - Consider moving patient to room near nurses station  Outcome: Adequate for  Discharge     Problem: SAFETY ADULT  Goal: Maintain or return to baseline ADL function  Description: INTERVENTIONS:  -  Assess patient's ability to carry out ADLs; assess patient's baseline for ADL function and identify physical deficits which impact ability to perform ADLs (bathing, care of mouth/teeth, toileting, grooming, dressing, etc.)  - Assess/evaluate cause of self-care deficits   - Assess range of motion  - Assess patient's mobility; develop plan if impaired  - Assess patient's need for assistive devices and provide as appropriate  - Encourage maximum independence but intervene and supervise when necessary  - Involve family in performance of ADLs  - Assess for home care needs following discharge   - Consider OT consult to assist with ADL evaluation and planning for discharge  - Provide patient education as appropriate  Outcome: Adequate for Discharge     Problem: DISCHARGE PLANNING  Goal: Discharge to home or other facility with appropriate resources  Description: INTERVENTIONS:  - Identify barriers to discharge w/patient and caregiver  - Arrange for needed discharge resources and transportation as appropriate  - Identify discharge learning needs (meds, wound care, etc.)  - Arrange for interpretive services to assist at discharge as needed  - Refer to Case Management Department for coordinating discharge planning if the patient needs post-hospital services based on physician/advanced practitioner order or complex needs related to functional status, cognitive ability, or social support system  Outcome: Adequate for Discharge     Problem: Knowledge Deficit  Goal: Patient/family/caregiver demonstrates understanding of disease process, treatment plan, medications, and discharge instructions  Description: Complete learning assessment and assess knowledge base.  Interventions:  - Provide teaching at level of understanding  - Provide teaching via preferred learning methods  Outcome: Adequate for Discharge      Problem: CARDIOVASCULAR - ADULT  Goal: Maintains optimal cardiac output and hemodynamic stability  Description: INTERVENTIONS:  - Monitor I/O, vital signs and rhythm  - Monitor for S/S and trends of decreased cardiac output  - Administer and titrate ordered vasoactive medications to optimize hemodynamic stability  - Assess quality of pulses, skin color and temperature  - Assess for signs of decreased coronary artery perfusion  - Instruct patient to report change in severity of symptoms  Outcome: Adequate for Discharge     Problem: RESPIRATORY - ADULT  Goal: Achieves optimal ventilation and oxygenation  Description: INTERVENTIONS:  - Assess for changes in respiratory status  - Assess for changes in mentation and behavior  - Position to facilitate oxygenation and minimize respiratory effort  - Oxygen administered by appropriate delivery if ordered  - Initiate smoking cessation education as indicated  - Encourage broncho-pulmonary hygiene including cough, deep breathe, Incentive Spirometry  - Assess the need for suctioning and aspirate as needed  - Assess and instruct to report SOB or any respiratory difficulty  - Respiratory Therapy support as indicated  Outcome: Adequate for Discharge     Problem: GASTROINTESTINAL - ADULT  Goal: Maintains adequate nutritional intake  Description: INTERVENTIONS:  - Monitor percentage of each meal consumed  - Identify factors contributing to decreased intake, treat as appropriate  - Assist with meals as needed  - Monitor I&O, weight, and lab values if indicated  - Obtain nutrition services referral as needed  Outcome: Adequate for Discharge     Problem: GENITOURINARY - ADULT  Goal: Maintains or returns to baseline urinary function  Description: INTERVENTIONS:  - Assess urinary function  - Encourage oral fluids to ensure adequate hydration if ordered  - Administer IV fluids as ordered to ensure adequate hydration  - Administer ordered medications as needed  - Offer frequent  toileting  Outcome: Adequate for Discharge     Problem: GENITOURINARY - ADULT  Goal: Absence of urinary retention  Description: INTERVENTIONS:  - Assess patient’s ability to void and empty bladder  - Monitor I/O  - Bladder scan as needed  - Discuss with physician/AP medications to alleviate retention as needed  - Discuss catheterization for long term situations as appropriate  Outcome: Adequate for Discharge     Problem: METABOLIC, FLUID AND ELECTROLYTES - ADULT  Goal: Electrolytes maintained within normal limits  Description: INTERVENTIONS:  - Monitor labs and assess patient for signs and symptoms of electrolyte imbalances  - Administer electrolyte replacement as ordered  - Monitor response to electrolyte replacements, including repeat lab results as appropriate  - Instruct patient on fluid and nutrition as appropriate  Outcome: Adequate for Discharge     Problem: METABOLIC, FLUID AND ELECTROLYTES - ADULT  Goal: Fluid balance maintained  Description: INTERVENTIONS:  - Monitor labs   - Monitor I/O and WT  - Instruct patient on fluid and nutrition as appropriate  - Assess for signs & symptoms of volume excess or deficit  Outcome: Adequate for Discharge     Problem: SKIN/TISSUE INTEGRITY - ADULT  Goal: Incision(s), wounds(s) or drain site(s) healing without S/S of infection  Description: INTERVENTIONS  - Assess and document dressing, incision, wound bed, drain sites and surrounding tissue  - Provide patient and family education  - Perform skin care/dressing changes   9/21/2024 1440 by Yesenia Basurto RN  Outcome: Adequate for Discharge     Problem: HEMATOLOGIC - ADULT  Goal: Maintains hematologic stability  Description: INTERVENTIONS  - Assess for signs and symptoms of bleeding or hemorrhage  - Monitor labs  - Administer supportive blood products/factors as ordered and appropriate  Outcome: Adequate for Discharge     Problem: MUSCULOSKELETAL - ADULT  Goal: Maintain or return mobility to safest level of  function  Description: INTERVENTIONS:  - Assess patient's ability to carry out ADLs; assess patient's baseline for ADL function and identify physical deficits which impact ability to perform ADLs (bathing, care of mouth/teeth, toileting, grooming, dressing, etc.)  - Assess/evaluate cause of self-care deficits   - Assess range of motion  - Assess patient's mobility  - Assess patient's need for assistive devices and provide as appropriate  - Encourage maximum independence but intervene and supervise when necessary  - Involve family in performance of ADLs  - Assess for home care needs following discharge   - Consider OT consult to assist with ADL evaluation and planning for discharge  - Provide patient education as appropriate  Outcome: Adequate for Discharge

## 2024-09-21 NOTE — ASSESSMENT & PLAN NOTE
Dog bite of right hand with swelling and edema  S/p bedside I&D by ortho 09/15  Without significant improvement, underwent I&D in OR on 09/18  Wound cultures from I&D on 09/15 growing staph coag negative  Continue IV antibiotics with Unasyn, cultures were negative  Discussed with ID recommend 3-week total course of antibiotics  Patient does have some report of numbness and tingling, concern for blood loss.  Discussed with Ortho and suspect likely due to swelling.  Recommended follow-up with orthopedics hand surgery in 1 week.

## 2024-09-21 NOTE — ASSESSMENT & PLAN NOTE
As above in right hand cellulitis   Double Island Pedicle Flap Text: The defect edges were debeveled with a #15 scalpel blade.  Given the location of the defect, shape of the defect and the proximity to free margins a double island pedicle advancement flap was deemed most appropriate.  Using a sterile surgical marker, an appropriate advancement flap was drawn incorporating the defect, outlining the appropriate donor tissue and placing the expected incisions within the relaxed skin tension lines where possible.    The area thus outlined was incised deep to adipose tissue with a #15 scalpel blade.  The skin margins were undermined to an appropriate distance in all directions around the primary defect and laterally outward around the island pedicle utilizing iris scissors.  There was minimal undermining beneath the pedicle flap.

## 2024-09-21 NOTE — ASSESSMENT & PLAN NOTE
POD 4 s/p right hand and wrist I&D with extensor compartment tenosynovectomy with Dr. Mccarthy on 9/17/24.    Plan  Pain Control  Continue warm soapy soaks to the hand and then re-wrap with xeroform, gauze, and ACE bandages with splint underneath.    Partial WB RUE (<5 lbs)  Antibiotics per infectious disease. Final intra-operative cultures pending.  Continue Strict elevation to the RUE and ice to the area  Medical management per primary   Ortho will continue to follow. No additional surgical intervention planned at this time.  F/u with Shari in 1 week as outpt once discharged.

## 2024-09-21 NOTE — TREATMENT PLAN
Patient's chart reviewed and cultures have finalized as negative from the OR.  Will transition him at this point to Augmentin 875 mg every 12 hours to complete a total of 21 days postprocedure through 10/7.  Last doses of antibiotics ordered.  No plans for follow-up in the ID office at this time.  Continue to follow-up otherwise with orthopedics.  Primary service updated.    ID consult service will sign off at this time.  Please call if questions.

## 2024-09-21 NOTE — ASSESSMENT & PLAN NOTE
-Management per primary team     Detail Level: Detailed Detail Level: Zone Sunscreen Recommendations: otc broad spectrum sunscreen with minimum spf of 30 or higher Camphor 0.5% Menthol 0.5% Recommendations: otc sarna lotion prn

## 2024-09-21 NOTE — PROGRESS NOTES
Patient was discharged at 1530 via ambulation to [facility]. Discharge instructions were explained & ample time was given to answer any questions. IV & masimo were removed. All belongings were packed & were sent home with the patient.

## 2024-09-23 ENCOUNTER — TELEPHONE (OUTPATIENT)
Dept: OBGYN CLINIC | Facility: HOSPITAL | Age: 59
End: 2024-09-23

## 2024-09-23 ENCOUNTER — TELEPHONE (OUTPATIENT)
Age: 59
End: 2024-09-23

## 2024-09-23 DIAGNOSIS — Z71.89 COMPLEX CARE COORDINATION: Primary | ICD-10-CM

## 2024-09-23 DIAGNOSIS — M79.605 LEFT LEG PAIN: ICD-10-CM

## 2024-09-23 NOTE — TELEPHONE ENCOUNTER
Hello,    Please advise if a forced appointment can be accommodated for the patient:    Call back #: [phone]    Insurance: Medicare    Reason for appointment: PO Irrigation and debridement right wrist and hand, any indicated procedures (Right: Wrist)  Shari Told to follow up 1 week from 09/17/24.   Patient said he did not call sooner because he was still in the hospital.    Requested doctor and/or location: Shari      Thank you.

## 2024-09-24 ENCOUNTER — PATIENT OUTREACH (OUTPATIENT)
Dept: CASE MANAGEMENT | Facility: OTHER | Age: 59
End: 2024-09-24

## 2024-09-24 ENCOUNTER — TRANSITIONAL CARE MANAGEMENT (OUTPATIENT)
Age: 59
End: 2024-09-24

## 2024-09-24 RX ORDER — TRAMADOL HYDROCHLORIDE 50 MG/1
100 TABLET ORAL
Qty: 60 TABLET | Refills: 0 | Status: SHIPPED | OUTPATIENT
Start: 2024-09-24

## 2024-09-24 NOTE — PROGRESS NOTES
Left message with my contact information for patient to return my call  PCP appointment scheduled for 9/25/24

## 2024-09-24 NOTE — PROGRESS NOTES
Ed returned my phone call. Reports having a lot of pain in his hand. Elevating it 90% of time. Using pain medications. Soaking hand 3-4 times a day.To see PCP tomorrow and orthopedics on 9/26/24  Taking antibiotic. Did not check blood sugar today yet.   No questions or concerns open to another phone call.   Had many kind words about his care thru Dr Olsen office

## 2024-09-24 NOTE — PROGRESS NOTES
Transition of Care Visit  Name: Yoni Castillo      : 1965      MRN: [id number]  Encounter Provider: Manny Flowers DO  Encounter Date: 2024   Encounter department: [facility]INE    Assessment & Plan  Dog bite, subsequent encounter  Patient presents to clinic today for a TCM visit following hospitalization from 2024 through 2024.    Patient originally presented to [facility] on  after sustaining a dog bite to his right hand.    He was empirically started on IV antibiotics and orthopedics was consulted and saw patient.    As swelling and erythema did not improve there was concern for possible tenosynovitis.  Bedside incision and drainage was done on 09/15.  Culture from that I&D at that time grew out staph coagulase-negative.  He was treated with Unasyn while in hospital.    His symptoms still did not improve and he was experiencing continued pain and swelling and Ortho decided on formal incision and drainage of right hand.  He tolerated the procedure well on 2024.    Infectious disease was consulted and recommended that patient be continued on antibiotics and recommended a 3-week total course of Augmentin 875-125 mg twice daily through 10/8/2024.    He was ultimately stable for discharge and was discharged from the hospital on 2024.   Exam showed very limited range of motion in all directions in right wrist and right fingures due to swelling and pain. Patient had right hand bandaged. Bandages taken down. Right hand swollen in wrist, hand, and fingers. Brusing along wounds on anterior and posterior aspect of wrist. Wounds and sutures deomonstrating good healing. No discharge or bleeding on exam.  Patient is taking antibiotics and using pain medications as prescribed.  He is aware of upcoming appointment on  with orthopedic hand surgery for further management.  He will keep and attend that appointment.    Patient presents to  clinic today accompanied by his wife.  He states that since his discharge he has been slowly but steadily improving.  He is keeping his right wrist bandaged appropriately.  He has noticed some continuous drainage though seems to be improving.  He has been compliant with his antibiotics and all other medications at time of discharge.  He is aware of his upcoming appointments and will keep and attend his appointment orthopedic hand surgery tomorrow on 09/26.  States that pain can still be severe and he is using Tylenol and oxycodone as prescribed.        Cellulitis of hand              History of Present Illness     Transitional Care Management Review:   Yoni Castillo is a 59 y.o. male here for TCM follow up.     During the TCM phone call patient stated:  TCM Call       Date and time call was made  9/24/2024  6:44 AM    Hospital care reviewed  Records reviewed    Patient was hospitialized at  [facility]    Date of Admission  09/11/24    Date of discharge  09/21/24    Diagnosis  Cellulitis of hand, right    Disposition  Home    Current Symptoms  None          TCM Call       Post hospital issues  None    Should patient be enrolled in anticoag monitoring?  No    Scheduled for follow up?  Yes    Did you obtain your prescribed medications  Yes    Do you need help managing your prescriptions or medications  No    Is transportation to your appointment needed  No    I have advised the patient to call PCP with any new or worsening symptoms  robert andrew m.a    Living Arrangements  Spouse or Significiant other    Support System  Family; [place]-based; Friends; Spouse    The type of support provided  Emotional; Physical    Do you have social support  Yes, as much as I need          Mr. Tarun Castillo is a 59-year-old male with a past medical history of hypertension, type 2 diabetes mellitus, CKD stage III, obesity.  Patient presents to clinic today for a TCM visit following hospitalization from 09/11/2024 through 09/21/2024.   Patient originally presented to [facility] on 09/11 after sustaining a dog bite to his right hand.  Initially had swelling, erythema, and CT scan was done which showed swelling of the volar wrist without evidence of fluid collection or gas.  He was empirically started on IV antibiotics and orthopedics was consulted and saw patient.  As swelling and erythema did not improve there was concern for possible tenosynovitis.  Bedside incision and drainage was done on 09/15 and patient was monitored for additional several days while in hospital.  Culture from that I&D at that time grew out staph coagulase-negative.  He was treated with Unasyn while in hospital.  His symptoms still did not improve and he was experiencing continued pain and swelling and Ortho decided on formal incision and drainage of right hand.  He tolerated the procedure well on 09/17/2024.  It was noted that there was murky fluid and inflamed tenosynovium.  Infectious disease was consulted and recommended that patient be continued on antibiotics.  Intraoperative cultures were negative and infectious disease recommended a 3-week total course of antibiotics after the incision and drainage.  At discharge she was given a prescription for Augmentin 875-125 mg twice daily through 10/8/2024.  He was ultimately stable for discharge and was discharged from the hospital on 09/21/2024 with a follow-up with orthopedic hand surgery as an outpatient in 1 week.  Staghorn calculus was noted on imaging during mission they recommended outpatient follow-up on 10/10 for further discussion and evaluation.    Patient presents to clinic today accompanied by his wife.  He states that since his discharge he has been slowly but steadily improving.  He is keeping his right wrist bandaged appropriately.  He has noticed some continuous drainage though seems to be improving.  He has been compliant with his antibiotics and all other medications at time of discharge.   He is aware of his upcoming appointments and will keep and attend his appointment orthopedic hand surgery tomorrow on 09/26.  States that pain can still be severe and he is using Tylenol and oxycodone as prescribed.  Presently denies fever, chills, nausea, vomiting, diarrhea, constipation, chest pain, shortness of breath.      Review of Systems   All other systems reviewed and are negative.    Objective     /82 (BP Location: Left arm, Patient Position: Sitting)   Pulse 81   Temp 97.9 °F (36.6 °C) (Tympanic)   Ht 6' (1.829 m)   Wt 125 kg (276 lb 9.6 oz)   SpO2 98%   BMI 37.51 kg/m²     Physical Exam  Constitutional:       General: He is not in acute distress.     Appearance: Normal appearance. He is not ill-appearing.   Cardiovascular:      Rate and Rhythm: Normal rate and regular rhythm.      Pulses: Normal pulses.      Heart sounds: Normal heart sounds. No murmur heard.  Pulmonary:      Effort: Pulmonary effort is normal. No respiratory distress.      Breath sounds: Normal breath sounds. No wheezing, rhonchi or rales.   Abdominal:      General: Bowel sounds are normal. There is no distension.      Palpations: Abdomen is soft.      Tenderness: There is no abdominal tenderness.   Musculoskeletal:         General: Swelling, tenderness (Very limited range of motion in all directions in right wrist and right fingures due to swelling and pain.) and signs of injury (Patient had right hand bandaged. Bandages taken down. Right hand swollen in wrist, hand, and fingers. Brusing along wounds on anterior and posterior aspect of wrist. Wounds and sutures deomonstrating good healing. No discharge or bleeding on exam.) present.      Right lower leg: No edema.      Left lower leg: No edema.   Skin:     General: Skin is warm and dry.   Neurological:      Mental Status: He is alert and oriented to person, place, and time.      Motor: No weakness.   Psychiatric:         Mood and Affect: Mood normal.         Behavior:  Behavior normal.         Thought Content: Thought content normal.       Medications have been reviewed by provider in current encounter    Administrative Statements

## 2024-09-25 ENCOUNTER — OFFICE VISIT (OUTPATIENT)
Age: 59
End: 2024-09-25
Payer: MEDICARE

## 2024-09-25 VITALS
OXYGEN SATURATION: 98 % | TEMPERATURE: 97.9 F | BODY MASS INDEX: 37.46 KG/M2 | HEART RATE: 81 BPM | HEIGHT: 72 IN | SYSTOLIC BLOOD PRESSURE: 150 MMHG | WEIGHT: 276.6 LBS | DIASTOLIC BLOOD PRESSURE: 82 MMHG

## 2024-09-25 DIAGNOSIS — L03.119 CELLULITIS OF HAND: ICD-10-CM
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
## 2024-09-25 PROCEDURE — 99495 TRANSJ CARE MGMT MOD F2F 14D: CPT | Performed by: STUDENT IN AN ORGANIZED HEALTH CARE EDUCATION/TRAINING PROGRAM

## 2024-09-25 NOTE — ASSESSMENT & PLAN NOTE
Patient presents to clinic today for a TCM visit following hospitalization from 09/11/2024 through 09/21/2024.    Patient originally presented to [facility] on 09/11 after sustaining a dog bite to his right hand.    He was empirically started on IV antibiotics and orthopedics was consulted and saw patient.    As swelling and erythema did not improve there was concern for possible tenosynovitis.  Bedside incision and drainage was done on 09/15.  Culture from that I&D at that time grew out staph coagulase-negative.  He was treated with Unasyn while in hospital.    His symptoms still did not improve and he was experiencing continued pain and swelling and Ortho decided on formal incision and drainage of right hand.  He tolerated the procedure well on 09/17/2024.    Infectious disease was consulted and recommended that patient be continued on antibiotics and recommended a 3-week total course of Augmentin 875-125 mg twice daily through 10/8/2024.    He was ultimately stable for discharge and was discharged from the hospital on 09/21/2024.   Exam showed very limited range of motion in all directions in right wrist and right fingures due to swelling and pain. Patient had right hand bandaged. Bandages taken down. Right hand swollen in wrist, hand, and fingers. Brusing along wounds on anterior and posterior aspect of wrist. Wounds and sutures deomonstrating good healing. No discharge or bleeding on exam.  Patient is taking antibiotics and using pain medications as prescribed.  He is aware of upcoming appointment on 09/26 with orthopedic hand surgery for further management.  He will keep and attend that appointment.    Patient presents to clinic today accompanied by his wife.  He states that since his discharge he has been slowly but steadily improving.  He is keeping his right wrist bandaged appropriately.  He has noticed some continuous drainage though seems to be improving.  He has been compliant with his  antibiotics and all other medications at time of discharge.  He is aware of his upcoming appointments and will keep and attend his appointment orthopedic hand surgery tomorrow on 09/26.  States that pain can still be severe and he is using Tylenol and oxycodone as prescribed.

## 2024-09-26 ENCOUNTER — PATIENT OUTREACH (OUTPATIENT)
Dept: CASE MANAGEMENT | Facility: OTHER | Age: 59
End: 2024-09-26

## 2024-09-26 ENCOUNTER — OFFICE VISIT (OUTPATIENT)
Dept: OBGYN CLINIC | Facility: MEDICAL CENTER | Age: 59
End: 2024-09-26

## 2024-09-26 VITALS
DIASTOLIC BLOOD PRESSURE: 90 MMHG | SYSTOLIC BLOOD PRESSURE: 149 MMHG | WEIGHT: 276 LBS | BODY MASS INDEX: 37.38 KG/M2 | HEIGHT: 72 IN | HEART RATE: 74 BPM

## 2024-09-26 DIAGNOSIS — L08.9 ANIMAL BITE OF LEFT HAND WITH INFECTION, SUBSEQUENT ENCOUNTER: Primary | ICD-10-CM
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
## 2024-09-26 DIAGNOSIS — S61.452D ANIMAL BITE OF LEFT HAND WITH INFECTION, SUBSEQUENT ENCOUNTER: Primary | ICD-10-CM

## 2024-09-26 PROCEDURE — 99024 POSTOP FOLLOW-UP VISIT: CPT | Performed by: ORTHOPAEDIC SURGERY

## 2024-09-26 RX ORDER — OXYCODONE HYDROCHLORIDE 10 MG/1
10 TABLET ORAL EVERY 8 HOURS PRN
Qty: 20 TABLET | Refills: 0 | Status: SHIPPED | OUTPATIENT
Start: 2024-09-26

## 2024-09-26 NOTE — PROGRESS NOTES
HAND & UPPER EXTREMITY OFFICE VISIT   Referred By:  No referring provider defined for this encounter.      Chief Complaint:     Right hand pain and numbness  DOI 9/10/24    Surgery:  Surgery Date: 9/17/2024 - Irrigation and debridement right wrist and hand, any indicated procedures - Right     History of Present Illness:   Patient presents now 9 days status post the above surgery. he reports pain, swelling, stiffness, and numbness of the right hand. He reports numbness to all of the fingers which began 1 day following the initial bite. He has very limited use of the hand due to the pain and stiffness. He is still currently taking Augmentin as prescribed.  He is avoiding taking pain medications.  He has been performing daily dressing changes and wearing the immobilization board intermittently.  He has been doing his warm soapy soaks as recommended.    Past Medical History:  Past Medical History:   Diagnosis Date    Chronic kidney disease 2012    Chronic pain disorder     Diabetes mellitus (HCC)     H/O eye surgery     High blood sugar     Hypertension     Kidney stone     Liver disease      Past Surgical History:   Procedure Laterality Date    GANGLION CYST EXCISION Left 3/25/2024    Procedure: EXCISION MUCOID CYST, INDEX FINGER DIP;  Surgeon: Carroll Mccarthy MD;  Location:  MAIN OR;  Service: Orthopedics    GANGLION CYST EXCISION Right 5/20/2024    Procedure: EXCISION MUCOID CYST - RIGHT INDEX AND MIDDLE FINGERS;  Surgeon: Carroll Mccarthy MD;  Location:  MAIN OR;  Service: Orthopedics    HERNIA REPAIR      INCISION AND DRAINAGE  01/16/2024    KIDNEY SURGERY      KNEE SURGERY  1980    MOUTH SURGERY      WA AMPUTATION METATARSAL W/TOE SINGLE Left 6/1/2022    Procedure: RAY RESECTION FOOT;  Surgeon: Hannah Melgoza DPM;  [facility];  Service: Podiatry    WA INCISION & DRAINAGE ABSCESS COMPLICATED/MULTIPLE Right 9/17/2024    Procedure: Irrigation and debridement right wrist and hand, any  indicated procedures;  Surgeon: Carroll Mccarthy MD;  [facility];  Service: Orthopedics    [place] RPR AA HERNIA 1ST < 3 CM REDUCIBLE N/A 7/14/2023    Procedure: REPAIR HERNIA INCISIONAL;  Surgeon: Matt Melo MD;  [facility]: [facility];  Service: General    WOUND DEBRIDEMENT Left 4/28/2022    Procedure: Left foot washout;  Surgeon: Hannah Melgoza DPM;  [facility];  Service: Podiatry    WOUND DEBRIDEMENT Left 6/6/2022    Procedure: DEBRIDEMENT WOUND (WASH OUT);  Surgeon: Hannah Melgoza DPM;  [facility];  Service: Podiatry     Family History   Problem Relation Age of Onset    Diabetes Paternal Grandmother     Diabetes Paternal Grandfather      Social History     Socioeconomic History    Marital status: Registered Domestic Partner     Spouse name: Not on file    Number of children: Not on file    Years of education: Not on file    Highest education level: Not on file   Occupational History    Not on file   Tobacco Use    Smoking status: Never    Smokeless tobacco: Never   Vaping Use    Vaping status: Never Used   Substance and Sexual Activity    Alcohol use: Yes     Alcohol/week: 1.0 standard drink of alcohol     Types: 1 Cans of beer per week     Comment: ocassional    Drug use: Never    Sexual activity: Not Currently     Partners: Female     Birth control/protection: Abstinence   Other Topics Concern    Not on file   Social History Narrative    Not on file     Social Determinants of Health     Financial Resource Strain: Not on file   Food Insecurity: No Food Insecurity (9/12/2024)    Hunger Vital Sign     Worried About Running Out of Food in the Last Year: Never true     Ran Out of Food in the Last Year: Never true   Transportation Needs: No Transportation Needs (9/12/2024)    PRAPARE - Transportation     Lack of Transportation (Medical): No     Lack of Transportation (Non-Medical): No   Physical Activity: Not on file   Stress: Not on file   Social Connections: Not on file    Intimate Partner Violence: Not on file   Housing Stability: Low Risk  (9/12/2024)    Housing Stability Vital Sign     Unable to Pay for Housing in the Last Year: No     Number of Times Moved in the Last Year: 0     Homeless in the Last Year: No     Scheduled Meds:  Continuous Infusions:No current facility-administered medications for this visit.    PRN Meds:.  Allergies   Allergen Reactions    Cephalexin Hives     Skin peeling  Tolerates penicillins (tolerated unasyn and zosyn)    Pollen Extract Sneezing    Metformin GI Intolerance       Physical Examination:    /90   Pulse 74   Ht 6' (1.829 m)   Wt 125 kg (276 lb)   BMI 37.43 kg/m²     Gen: A&Ox3, NAD    Right Upper Extremity:  Dressing clean and dry, removed  Incisions healing well without signs of continued superficial infection. Mild peeling of the superficial layer of skin and separation at the proximal aspect of dorsal incision site where it was closed more loosely. Some serous drainage on dressing.  Sutures intact, removed  Positive swelling throughout the hand and fingers  Diminished but present sensation to light touch of all fingertips. Sensation otherwise intact to light touch throughout the remainder of the hand.  Minimal active finger ROM due to swelling and stiffness. Limited wrist ROM due to stiffness and pain with approximately 10 degrees extension and 10 degrees flexion.   2+RP      Studies:  9/17/24: Final wound cultures without any growth    Assessment and Plan:  1. Animal bite of left hand with infection, subsequent encounter        2. Dog bite, initial encounter  oxyCODONE (ROXICODONE) 10 MG TABS          59 y.o. male presents 9 days status post Irrigation and debridement right wrist and hand, any indicated procedures - Right. Sutures removed and dressings changed in the office today. His incision sites have healed well but I am concerned regarding his continued significant pain in the hand and wrist.  We discussed her operative  findings in detail today and the culture results.  We discussed his prescription antibiotics.      We discussed options going forward including repeat I&D vs continued observation on antibiotics for 1 more week. He would like to continue with continued observation for now.  I think this is reasonable as this type of infection and inflammation can be slow to improve particularly in diabetic patients and he has no obvious outward signs of worsening or continued significant infection.  His intraoperative findings were also consistent with a more indolent type of infection with no obvious purulence and negative cultures.  he was instructed to complete the entire course of oral antibiotics as prescribed. he should continue to monitor for signs of infection including increased pain, redness, swelling, drainage, fevers/chills. It is recommended he return to the office in 1 week, or sooner should symptoms worsen.    he expressed understanding of the plan and agreed. We encouraged them to contact our office with any questions or concerns.         Carroll Mccarthy MD  Hand and Upper Extremity Surgery          *This note was dictated using Dragon voice recognition software. Please excuse any word substitutions or errors.*      Scribe Attestation      I,:  Yesenia Carrasquillo PA-C am acting as a scribe while in the presence of the attending physician.:       I,:  Carroll Mccarthy MD personally performed the services described in this documentation    as scribed in my presence.:

## 2024-09-26 NOTE — PROGRESS NOTES
Ed called to update me after his orthopedic appointment . He will be seen again on 10/2 and they will decide if he needs to back into surgery. Ed is very happy with Dr Mccarthy and his care. He will call and update me after his appointment

## 2024-09-30 DIAGNOSIS — E11.22 TYPE 2 DIABETES MELLITUS WITH CHRONIC KIDNEY DISEASE, WITHOUT LONG-TERM CURRENT USE OF INSULIN, UNSPECIFIED CKD STAGE (HCC): ICD-10-CM

## 2024-10-01 RX ORDER — DULAGLUTIDE 0.75 MG/.5ML
0.75 INJECTION, SOLUTION SUBCUTANEOUS
Qty: 2 ML | Refills: 5 | Status: SHIPPED | OUTPATIENT
Start: 2024-10-01

## 2024-10-02 ENCOUNTER — OFFICE VISIT (OUTPATIENT)
Dept: OBGYN CLINIC | Facility: MEDICAL CENTER | Age: 59
End: 2024-10-02

## 2024-10-02 ENCOUNTER — ANESTHESIA EVENT (OUTPATIENT)
Age: 59
End: 2024-10-02
Payer: MEDICARE

## 2024-10-02 VITALS
HEART RATE: 76 BPM | WEIGHT: 276 LBS | DIASTOLIC BLOOD PRESSURE: 78 MMHG | BODY MASS INDEX: 37.38 KG/M2 | SYSTOLIC BLOOD PRESSURE: 124 MMHG | HEIGHT: 72 IN
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
## 2024-10-02 DIAGNOSIS — S61.551S: ICD-10-CM

## 2024-10-02 DIAGNOSIS — L08.9: ICD-10-CM

## 2024-10-02 DIAGNOSIS — M65.949 TENOSYNOVITIS OF FINGER AND HAND: Primary | ICD-10-CM

## 2024-10-02 PROCEDURE — 99024 POSTOP FOLLOW-UP VISIT: CPT | Performed by: ORTHOPAEDIC SURGERY

## 2024-10-02 RX ORDER — CEFAZOLIN SODIUM 2 G/50ML
2000 SOLUTION INTRAVENOUS ONCE
Status: CANCELLED | OUTPATIENT
Start: 2024-10-02 | End: 2024-10-02

## 2024-10-02 RX ORDER — SODIUM CHLORIDE, SODIUM LACTATE, POTASSIUM CHLORIDE, CALCIUM CHLORIDE 600; 310; 30; 20 MG/100ML; MG/100ML; MG/100ML; MG/100ML
125 INJECTION, SOLUTION INTRAVENOUS CONTINUOUS
Status: CANCELLED | OUTPATIENT
Start: 2024-10-02

## 2024-10-02 RX ORDER — ACETAMINOPHEN 325 MG/1
975 TABLET ORAL ONCE
Status: CANCELLED | OUTPATIENT
Start: 2024-10-02 | End: 2024-10-02

## 2024-10-02 NOTE — PROGRESS NOTES
HAND & UPPER EXTREMITY OFFICE VISIT   Referred By:  Carroll Mccarthy Md  [address] [facility]  [address] [place] [address]      Chief Complaint:     Animal bite right wrist  Right wrist and ring finger pain  DOI 09/10/2024    Surgery:  Surgery Date: 9/17/2024 - Irrigation and debridement right wrist and hand, any indicated procedures - Right     History of Present Illness:   Patient presents now 2 weeks status post the above surgery. Since last visit he reports no significant improvement in symptoms.  He continues to have severe 8/10 pain in the wrist with use that is sharp and shoots up to his shoulder at its worst.. He reports limited use of the right wrist and hand. He reports numbness into the ring and long fingers that is intermittent. He reports pressure and burning in the ring finger worse with bathing. He is taking oxycodone and Tylenol for pain. He is taking Augmentin as prescribed. Patient is RHD, disabled - not working prior to this injury.. Has previous dog bite healed injury on left wrist.    Past Medical History:  Past Medical History:   Diagnosis Date    Chronic kidney disease 2012    Chronic pain disorder     Diabetes mellitus (HCC)     H/O eye surgery     High blood sugar     Hypertension     Kidney stone     Liver disease      Past Surgical History:   Procedure Laterality Date    GANGLION CYST EXCISION Left 3/25/2024    Procedure: EXCISION MUCOID CYST, INDEX FINGER DIP;  Surgeon: Carroll Mccarthy MD;  Location: [facility] OR;  Service: Orthopedics    GANGLION CYST EXCISION Right 5/20/2024    Procedure: EXCISION MUCOID CYST - RIGHT INDEX AND MIDDLE FINGERS;  Surgeon: Carroll Mccarthy MD;  Location: [facility] OR;  Service: Orthopedics    HERNIA REPAIR      INCISION AND DRAINAGE  01/16/2024    KIDNEY SURGERY      KNEE SURGERY  1980    MOUTH SURGERY      [place] AMPUTATION METATARSAL W/TOE SINGLE Left 6/1/2022    Procedure: RAY RESECTION FOOT;  Surgeon: Hannah Melgoza DPM;  Location: [facility]  OR;  Service: Podiatry    AZ INCISION & DRAINAGE ABSCESS COMPLICATED/MULTIPLE Right 9/17/2024    Procedure: Irrigation and debridement right wrist and hand, any indicated procedures;  Surgeon: Carroll Mccarthy MD;  [facility];  Service: Orthopedics    [place] RPR AA HERNIA 1ST < 3 CM REDUCIBLE N/A 7/14/2023    Procedure: REPAIR HERNIA INCISIONAL;  Surgeon: Matt Melo MD;  [facility];  Service: General    WOUND DEBRIDEMENT Left 4/28/2022    Procedure: Left foot washout;  Surgeon: Hannah Melgoza DPM;  [facility];  Service: Podiatry    WOUND DEBRIDEMENT Left 6/6/2022    Procedure: DEBRIDEMENT WOUND (WASH OUT);  Surgeon: Hannah Melgoza DPM;  [facility];  Service: Podiatry     Family History   Problem Relation Age of Onset    Diabetes Paternal Grandmother     Diabetes Paternal Grandfather      Social History     Socioeconomic History    Marital status: Registered Domestic Partner     Spouse name: Not on file    Number of children: Not on file    Years of education: Not on file    Highest education level: Not on file   Occupational History    Not on file   Tobacco Use    Smoking status: Never    Smokeless tobacco: Never   Vaping Use    Vaping status: Never Used   Substance and Sexual Activity    Alcohol use: Yes     Alcohol/week: 1.0 standard drink of alcohol     Types: 1 Cans of beer per week     Comment: ocassional    Drug use: Never    Sexual activity: Not Currently     Partners: Female     Birth control/protection: Abstinence   Other Topics Concern    Not on file   Social History Narrative    Not on file     Social Determinants of Health     Financial Resource Strain: Not on file   Food Insecurity: No Food Insecurity (9/12/2024)    Hunger Vital Sign     Worried About Running Out of Food in the Last Year: Never true     Ran Out of Food in the Last Year: Never true   Transportation Needs: No Transportation Needs (9/12/2024)    PRAPARE - Transportation     Lack of  Transportation (Medical): No     Lack of Transportation (Non-Medical): No   Physical Activity: Not on file   Stress: Not on file   Social Connections: Not on file   Intimate Partner Violence: Not on file   Housing Stability: Low Risk  (9/12/2024)    Housing Stability Vital Sign     Unable to Pay for Housing in the Last Year: No     Number of Times Moved in the Last Year: 0     Homeless in the Last Year: No     Scheduled Meds:  Continuous Infusions:No current facility-administered medications for this visit.    PRN Meds:.  Allergies   Allergen Reactions    Cephalexin Hives     Skin peeling  Tolerates penicillins (tolerated unasyn and zosyn)    Pollen Extract Sneezing    Metformin GI Intolerance       Physical Examination:    /78   Pulse 76   Ht 6' (1.829 m)   Wt 125 kg (276 lb)   BMI 37.43 kg/m²     Gen: A&Ox3, NAD    Right Upper Extremity:  Dressing clean and dry, removed  Underlying incision healing well distally and proximally with some opening still centrally.  There is some fibrinous tissue at the opening but no drainage.  No obvious fluctuance.  Significant tenderness over the central extensor tendons.  Unable to form composite fist or with minimal active finger extension.  No pain volarly with passive finger extension.  No tenderness over the flexor sheath in the hand.    He is very tender over his volar wrist incision though.    Minimal wrist flexion and extension but there is a 20 degree arc of motion without significant pain.  Able to supinate or pronate  Improved thumb ROM.  Sensation intact to light touch in the axillary median, ulnar, and radial nerve distributions  2+RP        Studies:  No new images available for review.    Cultures:  Wound culture from bedside I&D on 9/15/2024 grew coag negative staph in broth only which is likely most consistent with a skin contaminant.    OR cultures from 9/17/2024 had no growth      Assessment and Plan:  1. Tenosynovitis of finger and hand  Case request  operating room: Irrigation and debridement, right wrist, volar and dorsal, possible extensor and flexor tenosynovectomy, any indicated procedures    Case request operating room: Irrigation and debridement, right wrist, volar and dorsal, possible extensor and flexor tenosynovectomy, any indicated procedures      2. Dog bite of right wrist with infection, sequela  Case request operating room: Irrigation and debridement, right wrist, volar and dorsal, possible extensor and flexor tenosynovectomy, any indicated procedures    Case request operating room: Irrigation and debridement, right wrist, volar and dorsal, possible extensor and flexor tenosynovectomy, any indicated procedures          59 y.o. male presents 2 weeks status post Irrigation and debridement right wrist and hand, any indicated procedures - Right, DOS 09/11/2024.  Given his failure to progress in any of his symptoms after his previous I&D and oral antibiotics I recommended a repeat diagnostic I&D and repeat cultures to evaluate for any residual infection or determine if the Augmentin is the appropriate antibiotic for his infection.  We will have the OR cultures held for 14 days.  I think he has a significant ring finger pain with flexion is related to his continued irritation of his extensor tendons.  I have a low concern for ring finger flexor infectious tenosynovitis, septic arthritis of the wrist or necrotizing fasciitis.    Patient was re-wrapped with xeroform, guaze and ACE wrap. Patient agrees with this plan.    We reviewed the risks of surgery including continued or worsening infection, need for additional surgery, bleeding, injury to nearby structures including the nerve, poor wound healing, stiffness, CRPS, and incomplete resolution or recurrence of symptoms. We reviewed the details of the procedure and the anticipated recovery period. All questions answered to patient's satisfaction. Written consent obtained in the office today.     Right wrist  irrigation debridement, volar and dorsal with possible extensor and flexor tenosynovectomy, any indicated procedures.  Regional anesthesia with sedation    I recommend they follow up Return for postop check. he expressed understanding of the plan and agreed. We encouraged them to contact our office with any questions or concerns.         Carroll Mccarthy MD  Hand and Upper Extremity Surgery          *This note was dictated using Dragon voice recognition software. Please excuse any word substitutions or errors.*

## 2024-10-02 NOTE — H&P
HAND & UPPER EXTREMITY OFFICE VISIT   Referred By:  Carroll Mccarthy Md  [address] [facility] [facility]  [address]nd [place] [address]      Chief Complaint:     Animal bite right wrist  Right wrist and ring finger pain  DOI 09/10/2024    Surgery:  Surgery Date: 9/17/2024 - Irrigation and debridement right wrist and hand, any indicated procedures - Right     History of Present Illness:   Patient presents now 2 weeks status post the above surgery. Since last visit he reports no significant improvement in symptoms.  He continues to have severe 8/10 pain in the wrist with use that is sharp and shoots up to his shoulder at its worst.. He reports limited use of the right wrist and hand. He reports numbness into the ring and long fingers that is intermittent. He reports pressure and burning in the ring finger worse with bathing. He is taking oxycodone and Tylenol for pain. He is taking Augmentin as prescribed. Patient is RHD, disabled - not working prior to this injury.. Has previous dog bite healed injury on left wrist.    Past Medical History:  Past Medical History:   Diagnosis Date    Chronic kidney disease 2012    Chronic pain disorder     Diabetes mellitus (HCC)     H/O eye surgery     High blood sugar     Hypertension     Kidney stone     Liver disease      Past Surgical History:   Procedure Laterality Date    GANGLION CYST EXCISION Left 3/25/2024    Procedure: EXCISION MUCOID CYST, INDEX FINGER DIP;  Surgeon: Carroll Mccarthy MD;  Location: [facility] OR;  Service: Orthopedics    GANGLION CYST EXCISION Right 5/20/2024    Procedure: EXCISION MUCOID CYST - RIGHT INDEX AND MIDDLE FINGERS;  Surgeon: Carroll Mccarthy MD;  Location: [facility] OR;  Service: Orthopedics    HERNIA REPAIR      INCISION AND DRAINAGE  01/16/2024    KIDNEY SURGERY      KNEE SURGERY  1980    MOUTH SURGERY      [place] AMPUTATION METATARSAL W/TOE SINGLE Left 6/1/2022    Procedure: RAY RESECTION FOOT;  Surgeon: Hannah Melgoza DPM;  Location: [facility]  OR;  Service: Podiatry    [place] INCISION & DRAINAGE ABSCESS COMPLICATED/MULTIPLE Right 9/17/2024    Procedure: Irrigation and debridement right wrist and hand, any indicated procedures;  Surgeon: Carroll Mccarthy MD;  [facility];  Service: Orthopedics    [place] RPR AA HERNIA 1ST < 3 CM REDUCIBLE N/A 7/14/2023    Procedure: REPAIR HERNIA INCISIONAL;  Surgeon: Matt Melo MD;  [facility];  Service: General    WOUND DEBRIDEMENT Left 4/28/2022    Procedure: Left foot washout;  Surgeon: Hannah Melgoza DPM;  [facility];  Service: Podiatry    WOUND DEBRIDEMENT Left 6/6/2022    Procedure: DEBRIDEMENT WOUND (WASH OUT);  Surgeon: Hannah Melgoza DPM;  [facility];  Service: Podiatry     Family History   Problem Relation Age of Onset    Diabetes Paternal Grandmother     Diabetes Paternal Grandfather      Social History     Socioeconomic History    Marital status: Registered Domestic Partner     Spouse name: Not on file    Number of children: Not on file    Years of education: Not on file    Highest education level: Not on file   Occupational History    Not on file   Tobacco Use    Smoking status: Never    Smokeless tobacco: Never   Vaping Use    Vaping status: Never Used   Substance and Sexual Activity    Alcohol use: Yes     Alcohol/week: 1.0 standard drink of alcohol     Types: 1 Cans of beer per week     Comment: ocassional    Drug use: Never    Sexual activity: Not Currently     Partners: Female     Birth control/protection: Abstinence   Other Topics Concern    Not on file   Social History Narrative    Not on file     Social Determinants of Health     Financial Resource Strain: Not on file   Food Insecurity: No Food Insecurity (9/12/2024)    Hunger Vital Sign     Worried About Running Out of Food in the Last Year: Never true     Ran Out of Food in the Last Year: Never true   Transportation Needs: No Transportation Needs (9/12/2024)    PRAPARE - Transportation     Lack of  Transportation (Medical): No     Lack of Transportation (Non-Medical): No   Physical Activity: Not on file   Stress: Not on file   Social Connections: Not on file   Intimate Partner Violence: Not on file   Housing Stability: Low Risk  (9/12/2024)    Housing Stability Vital Sign     Unable to Pay for Housing in the Last Year: No     Number of Times Moved in the Last Year: 0     Homeless in the Last Year: No     Scheduled Meds:  Continuous Infusions:No current facility-administered medications for this visit.    PRN Meds:.  Allergies   Allergen Reactions    Cephalexin Hives     Skin peeling  Tolerates penicillins (tolerated unasyn and zosyn)    Pollen Extract Sneezing    Metformin GI Intolerance       Physical Examination:    /78   Pulse 76   Ht 6' (1.829 m)   Wt 125 kg (276 lb)   BMI 37.43 kg/m²     Gen: A&Ox3, NAD    Right Upper Extremity:  Dressing clean and dry, removed  Underlying incision healing well distally and proximally with some opening still centrally.  There is some fibrinous tissue at the opening but no drainage.  No obvious fluctuance.  Significant tenderness over the central extensor tendons.  Unable to form composite fist or with minimal active finger extension.  No pain volarly with passive finger extension.  No tenderness over the flexor sheath in the hand.    He is very tender over his volar wrist incision though.    Minimal wrist flexion and extension but there is a 20 degree arc of motion without significant pain.  Able to supinate or pronate  Improved thumb ROM.  Sensation intact to light touch in the axillary median, ulnar, and radial nerve distributions  2+RP        Studies:  No new images available for review.    Cultures:  Wound culture from bedside I&D on 9/15/2024 grew coag negative staph in broth only which is likely most consistent with a skin contaminant.    OR cultures from 9/17/2024 had no growth      Assessment and Plan:  1. Tenosynovitis of finger and hand  Case request  operating room: Irrigation and debridement, right wrist, volar and dorsal, possible extensor and flexor tenosynovectomy, any indicated procedures    Case request operating room: Irrigation and debridement, right wrist, volar and dorsal, possible extensor and flexor tenosynovectomy, any indicated procedures      2. Dog bite of right wrist with infection, sequela  Case request operating room: Irrigation and debridement, right wrist, volar and dorsal, possible extensor and flexor tenosynovectomy, any indicated procedures    Case request operating room: Irrigation and debridement, right wrist, volar and dorsal, possible extensor and flexor tenosynovectomy, any indicated procedures          59 y.o. male presents 2 weeks status post Irrigation and debridement right wrist and hand, any indicated procedures - Right, DOS 09/11/2024.  Given his failure to progress in any of his symptoms after his previous I&D and oral antibiotics I recommended a repeat diagnostic I&D and repeat cultures to evaluate for any residual infection or determine if the Augmentin is the appropriate antibiotic for his infection.  We will have the OR cultures held for 14 days.  I think he has a significant ring finger pain with flexion is related to his continued irritation of his extensor tendons.  I have a low concern for ring finger flexor infectious tenosynovitis, septic arthritis of the wrist or necrotizing fasciitis.    Patient was re-wrapped with xeroform, guaze and ACE wrap. Patient agrees with this plan.    We reviewed the risks of surgery including continued or worsening infection, need for additional surgery, bleeding, injury to nearby structures including the nerve, poor wound healing, stiffness, CRPS, and incomplete resolution or recurrence of symptoms. We reviewed the details of the procedure and the anticipated recovery period. All questions answered to patient's satisfaction. Written consent obtained in the office today.     Right wrist  irrigation debridement, volar and dorsal with possible extensor and flexor tenosynovectomy, any indicated procedures.  Regional anesthesia with sedation    I recommend they follow up Return for postop check. he expressed understanding of the plan and agreed. We encouraged them to contact our office with any questions or concerns.         Carroll Mccarthy MD  Hand and Upper Extremity Surgery          *This note was dictated using Dragon voice recognition software. Please excuse any word substitutions or errors.*

## 2024-10-02 NOTE — H&P (VIEW-ONLY)
HAND & UPPER EXTREMITY OFFICE VISIT   Referred By:  Carroll Mccarthy Md  [address] [facility]  [address] [place] [address]      Chief Complaint:     Animal bite right wrist  Right wrist and ring finger pain  DOI 09/10/2024    Surgery:  Surgery Date: 9/17/2024 - Irrigation and debridement right wrist and hand, any indicated procedures - Right     History of Present Illness:   Patient presents now 2 weeks status post the above surgery. Since last visit he reports no significant improvement in symptoms.  He continues to have severe 8/10 pain in the wrist with use that is sharp and shoots up to his shoulder at its worst.. He reports limited use of the right wrist and hand. He reports numbness into the ring and long fingers that is intermittent. He reports pressure and burning in the ring finger worse with bathing. He is taking oxycodone and Tylenol for pain. He is taking Augmentin as prescribed. Patient is RHD, disabled - not working prior to this injury.. Has previous dog bite healed injury on left wrist.    Past Medical History:  Past Medical History:   Diagnosis Date    Chronic kidney disease 2012    Chronic pain disorder     Diabetes mellitus (HCC)     H/O eye surgery     High blood sugar     Hypertension     Kidney stone     Liver disease      Past Surgical History:   Procedure Laterality Date    GANGLION CYST EXCISION Left 3/25/2024    Procedure: EXCISION MUCOID CYST, INDEX FINGER DIP;  Surgeon: Carroll Mccarthy MD;  Location: [facility] OR;  Service: Orthopedics    GANGLION CYST EXCISION Right 5/20/2024    Procedure: EXCISION MUCOID CYST - RIGHT INDEX AND MIDDLE FINGERS;  Surgeon: Carroll Mccarthy MD;  Location: [facility] OR;  Service: Orthopedics    HERNIA REPAIR      INCISION AND DRAINAGE  01/16/2024    KIDNEY SURGERY      KNEE SURGERY  1980    MOUTH SURGERY      [place] AMPUTATION METATARSAL W/TOE SINGLE Left 6/1/2022    Procedure: RAY RESECTION FOOT;  Surgeon: Hannah Melgoza DPM;  Location: [facility]  OR;  Service: Podiatry    [place] INCISION & DRAINAGE ABSCESS COMPLICATED/MULTIPLE Right 9/17/2024    Procedure: Irrigation and debridement right wrist and hand, any indicated procedures;  Surgeon: Carroll Mccarthy MD;  [facility];  Service: Orthopedics    [place] RPR AA HERNIA 1ST < 3 CM REDUCIBLE N/A 7/14/2023    Procedure: REPAIR HERNIA INCISIONAL;  Surgeon: Matt Melo MD;  [facility];  Service: General    WOUND DEBRIDEMENT Left 4/28/2022    Procedure: Left foot washout;  Surgeon: Hannah Melgoza DPM;  [facility];  Service: Podiatry    WOUND DEBRIDEMENT Left 6/6/2022    Procedure: DEBRIDEMENT WOUND (WASH OUT);  Surgeon: Hannah Melgoza DPM;  [facility];  Service: Podiatry     Family History   Problem Relation Age of Onset    Diabetes Paternal Grandmother     Diabetes Paternal Grandfather      Social History     Socioeconomic History    Marital status: Registered Domestic Partner     Spouse name: Not on file    Number of children: Not on file    Years of education: Not on file    Highest education level: Not on file   Occupational History    Not on file   Tobacco Use    Smoking status: Never    Smokeless tobacco: Never   Vaping Use    Vaping status: Never Used   Substance and Sexual Activity    Alcohol use: Yes     Alcohol/week: 1.0 standard drink of alcohol     Types: 1 Cans of beer per week     Comment: ocassional    Drug use: Never    Sexual activity: Not Currently     Partners: Female     Birth control/protection: Abstinence   Other Topics Concern    Not on file   Social History Narrative    Not on file     Social Determinants of Health     Financial Resource Strain: Not on file   Food Insecurity: No Food Insecurity (9/12/2024)    Hunger Vital Sign     Worried About Running Out of Food in the Last Year: Never true     Ran Out of Food in the Last Year: Never true   Transportation Needs: No Transportation Needs (9/12/2024)    PRAPARE - Transportation     Lack of  Transportation (Medical): No     Lack of Transportation (Non-Medical): No   Physical Activity: Not on file   Stress: Not on file   Social Connections: Not on file   Intimate Partner Violence: Not on file   Housing Stability: Low Risk  (9/12/2024)    Housing Stability Vital Sign     Unable to Pay for Housing in the Last Year: No     Number of Times Moved in the Last Year: 0     Homeless in the Last Year: No     Scheduled Meds:  Continuous Infusions:No current facility-administered medications for this visit.    PRN Meds:.  Allergies   Allergen Reactions    Cephalexin Hives     Skin peeling  Tolerates penicillins (tolerated unasyn and zosyn)    Pollen Extract Sneezing    Metformin GI Intolerance       Physical Examination:    /78   Pulse 76   Ht 6' (1.829 m)   Wt 125 kg (276 lb)   BMI 37.43 kg/m²     Gen: A&Ox3, NAD    Right Upper Extremity:  Dressing clean and dry, removed  Underlying incision healing well distally and proximally with some opening still centrally.  There is some fibrinous tissue at the opening but no drainage.  No obvious fluctuance.  Significant tenderness over the central extensor tendons.  Unable to form composite fist or with minimal active finger extension.  No pain volarly with passive finger extension.  No tenderness over the flexor sheath in the hand.    He is very tender over his volar wrist incision though.    Minimal wrist flexion and extension but there is a 20 degree arc of motion without significant pain.  Able to supinate or pronate  Improved thumb ROM.  Sensation intact to light touch in the axillary median, ulnar, and radial nerve distributions  2+RP        Studies:  No new images available for review.    Cultures:  Wound culture from bedside I&D on 9/15/2024 grew coag negative staph in broth only which is likely most consistent with a skin contaminant.    OR cultures from 9/17/2024 had no growth      Assessment and Plan:  1. Tenosynovitis of finger and hand  Case request  operating room: Irrigation and debridement, right wrist, volar and dorsal, possible extensor and flexor tenosynovectomy, any indicated procedures    Case request operating room: Irrigation and debridement, right wrist, volar and dorsal, possible extensor and flexor tenosynovectomy, any indicated procedures      2. Dog bite of right wrist with infection, sequela  Case request operating room: Irrigation and debridement, right wrist, volar and dorsal, possible extensor and flexor tenosynovectomy, any indicated procedures    Case request operating room: Irrigation and debridement, right wrist, volar and dorsal, possible extensor and flexor tenosynovectomy, any indicated procedures          59 y.o. male presents 2 weeks status post Irrigation and debridement right wrist and hand, any indicated procedures - Right, DOS 09/11/2024.  Given his failure to progress in any of his symptoms after his previous I&D and oral antibiotics I recommended a repeat diagnostic I&D and repeat cultures to evaluate for any residual infection or determine if the Augmentin is the appropriate antibiotic for his infection.  We will have the OR cultures held for 14 days.  I think he has a significant ring finger pain with flexion is related to his continued irritation of his extensor tendons.  I have a low concern for ring finger flexor infectious tenosynovitis, septic arthritis of the wrist or necrotizing fasciitis.    Patient was re-wrapped with xeroform, guaze and ACE wrap. Patient agrees with this plan.    We reviewed the risks of surgery including continued or worsening infection, need for additional surgery, bleeding, injury to nearby structures including the nerve, poor wound healing, stiffness, CRPS, and incomplete resolution or recurrence of symptoms. We reviewed the details of the procedure and the anticipated recovery period. All questions answered to patient's satisfaction. Written consent obtained in the office today.     Right wrist  irrigation debridement, volar and dorsal with possible extensor and flexor tenosynovectomy, any indicated procedures.  Regional anesthesia with sedation    I recommend they follow up Return for postop check. he expressed understanding of the plan and agreed. We encouraged them to contact our office with any questions or concerns.         Carroll Mccarthy MD  Hand and Upper Extremity Surgery          *This note was dictated using Dragon voice recognition software. Please excuse any word substitutions or errors.*

## 2024-10-03 ENCOUNTER — HOSPITAL ENCOUNTER (OUTPATIENT)
Age: 59
Setting detail: OUTPATIENT SURGERY
Discharge: HOME | End: 2024-10-03
Attending: ORTHOPAEDIC SURGERY | Admitting: ORTHOPAEDIC SURGERY
Payer: MEDICARE

## 2024-10-03 ENCOUNTER — PATIENT OUTREACH (OUTPATIENT)
Dept: CASE MANAGEMENT | Facility: OTHER | Age: 59
End: 2024-10-03

## 2024-10-03 ENCOUNTER — ANESTHESIA (OUTPATIENT)
Age: 59
End: 2024-10-03
Payer: MEDICARE

## 2024-10-03 VITALS
BODY MASS INDEX: 37.36 KG/M2 | DIASTOLIC BLOOD PRESSURE: 89 MMHG | RESPIRATION RATE: 15 BRPM | WEIGHT: 275.8 LBS | HEIGHT: 72 IN | SYSTOLIC BLOOD PRESSURE: 152 MMHG | TEMPERATURE: 97.9 F | HEART RATE: 75 BPM | OXYGEN SATURATION: 92 %

## 2024-10-03 DIAGNOSIS — M65.90 TENOSYNOVITIS: Primary | ICD-10-CM

## 2024-10-03 DIAGNOSIS — M65.949 TENOSYNOVITIS OF FINGER AND HAND: ICD-10-CM

## 2024-10-03 DIAGNOSIS — S61.551S: ICD-10-CM

## 2024-10-03 DIAGNOSIS — L08.9: ICD-10-CM
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
## 2024-10-03 LAB
BASE EXCESS BLDA CALC-SCNC: -2 MMOL/L (ref -2–3)
CA-I BLD-SCNC: 1.29 MMOL/L (ref 1.12–1.32)
GLUCOSE SERPL-MCNC: 110 MG/DL (ref 65–140)
GLUCOSE SERPL-MCNC: 112 MG/DL (ref 65–140)
GLUCOSE SERPL-MCNC: 154 MG/DL (ref 65–140)
HCO3 BLDA-SCNC: 25 MMOL/L (ref 22–28)
HCT VFR BLD CALC: 32 % (ref 36.5–49.3)
HGB BLDA-MCNC: 10.9 G/DL (ref 12–17)
PCO2 BLD: 27 MMOL/L (ref 21–32)
PCO2 BLD: 50.1 MM HG (ref 36–44)
PH BLD: 7.31 [PH] (ref 7.35–7.45)
PO2 BLD: 104 MM HG (ref 75–129)
POTASSIUM BLD-SCNC: 4.6 MMOL/L (ref 3.5–5.3)
SAO2 % BLD FROM PO2: 97 % (ref 60–85)
SODIUM BLD-SCNC: 136 MMOL/L (ref 136–145)
SPECIMEN SOURCE: ABNORMAL

## 2024-10-03 PROCEDURE — 87116 MYCOBACTERIA CULTURE: CPT | Performed by: ORTHOPAEDIC SURGERY

## 2024-10-03 PROCEDURE — 82330 ASSAY OF CALCIUM: CPT

## 2024-10-03 PROCEDURE — 84132 ASSAY OF SERUM POTASSIUM: CPT

## 2024-10-03 PROCEDURE — 82803 BLOOD GASES ANY COMBINATION: CPT

## 2024-10-03 PROCEDURE — 87070 CULTURE OTHR SPECIMN AEROBIC: CPT | Performed by: ORTHOPAEDIC SURGERY

## 2024-10-03 PROCEDURE — 87102 FUNGUS ISOLATION CULTURE: CPT | Performed by: ORTHOPAEDIC SURGERY

## 2024-10-03 PROCEDURE — 85014 HEMATOCRIT: CPT

## 2024-10-03 PROCEDURE — 84295 ASSAY OF SERUM SODIUM: CPT

## 2024-10-03 PROCEDURE — 82948 REAGENT STRIP/BLOOD GLUCOSE: CPT

## 2024-10-03 PROCEDURE — 82947 ASSAY GLUCOSE BLOOD QUANT: CPT

## 2024-10-03 PROCEDURE — 87205 SMEAR GRAM STAIN: CPT | Performed by: ORTHOPAEDIC SURGERY

## 2024-10-03 PROCEDURE — 87206 SMEAR FLUORESCENT/ACID STAI: CPT | Performed by: ORTHOPAEDIC SURGERY

## 2024-10-03 PROCEDURE — 87176 TISSUE HOMOGENIZATION CULTR: CPT | Performed by: ORTHOPAEDIC SURGERY

## 2024-10-03 PROCEDURE — 87075 CULTR BACTERIA EXCEPT BLOOD: CPT | Performed by: ORTHOPAEDIC SURGERY

## 2024-10-03 PROCEDURE — 87077 CULTURE AEROBIC IDENTIFY: CPT | Performed by: ORTHOPAEDIC SURGERY

## 2024-10-03 PROCEDURE — 87147 CULTURE TYPE IMMUNOLOGIC: CPT | Performed by: ORTHOPAEDIC SURGERY

## 2024-10-03 RX ORDER — METOCLOPRAMIDE HYDROCHLORIDE 5 MG/ML
10 INJECTION INTRAMUSCULAR; INTRAVENOUS ONCE AS NEEDED
Status: DISCONTINUED | OUTPATIENT
Start: 2024-10-03 | End: 2024-10-03 | Stop reason: HOSPADM

## 2024-10-03 RX ORDER — HYDROMORPHONE HCL/PF 1 MG/ML
0.5 SYRINGE (ML) INJECTION
Status: DISCONTINUED | OUTPATIENT
Start: 2024-10-03 | End: 2024-10-03 | Stop reason: HOSPADM

## 2024-10-03 RX ORDER — FENTANYL CITRATE/PF 50 MCG/ML
25 SYRINGE (ML) INJECTION
Status: DISCONTINUED | OUTPATIENT
Start: 2024-10-03 | End: 2024-10-03 | Stop reason: HOSPADM

## 2024-10-03 RX ORDER — MAGNESIUM HYDROXIDE 1200 MG/15ML
LIQUID ORAL AS NEEDED
Status: DISCONTINUED | OUTPATIENT
Start: 2024-10-03 | End: 2024-10-03 | Stop reason: HOSPADM

## 2024-10-03 RX ORDER — FENTANYL CITRATE 50 UG/ML
INJECTION, SOLUTION INTRAMUSCULAR; INTRAVENOUS AS NEEDED
Status: DISCONTINUED | OUTPATIENT
Start: 2024-10-03 | End: 2024-10-03

## 2024-10-03 RX ORDER — CEFAZOLIN SODIUM 2 G/50ML
2000 SOLUTION INTRAVENOUS ONCE
Status: DISCONTINUED | OUTPATIENT
Start: 2024-10-03 | End: 2024-10-03

## 2024-10-03 RX ORDER — ACETAMINOPHEN 325 MG/1
975 TABLET ORAL ONCE
Status: DISCONTINUED | OUTPATIENT
Start: 2024-10-03 | End: 2024-10-03

## 2024-10-03 RX ORDER — BUPIVACAINE HYDROCHLORIDE 5 MG/ML
INJECTION, SOLUTION EPIDURAL; INTRACAUDAL
Status: COMPLETED | OUTPATIENT
Start: 2024-10-03 | End: 2024-10-03

## 2024-10-03 RX ORDER — OXYCODONE HYDROCHLORIDE 5 MG/1
5 TABLET ORAL EVERY 4 HOURS PRN
Status: DISCONTINUED | OUTPATIENT
Start: 2024-10-03 | End: 2024-10-03 | Stop reason: HOSPADM

## 2024-10-03 RX ORDER — LIDOCAINE HYDROCHLORIDE 10 MG/ML
INJECTION, SOLUTION EPIDURAL; INFILTRATION; INTRACAUDAL; PERINEURAL AS NEEDED
Status: DISCONTINUED | OUTPATIENT
Start: 2024-10-03 | End: 2024-10-03

## 2024-10-03 RX ORDER — SODIUM CHLORIDE, SODIUM LACTATE, POTASSIUM CHLORIDE, CALCIUM CHLORIDE 600; 310; 30; 20 MG/100ML; MG/100ML; MG/100ML; MG/100ML
125 INJECTION, SOLUTION INTRAVENOUS CONTINUOUS
Status: DISCONTINUED | OUTPATIENT
Start: 2024-10-03 | End: 2024-10-03 | Stop reason: HOSPADM

## 2024-10-03 RX ORDER — PROMETHAZINE HYDROCHLORIDE 25 MG/ML
12.5 INJECTION, SOLUTION INTRAMUSCULAR; INTRAVENOUS ONCE AS NEEDED
Status: DISCONTINUED | OUTPATIENT
Start: 2024-10-03 | End: 2024-10-03 | Stop reason: HOSPADM

## 2024-10-03 RX ORDER — ONDANSETRON 2 MG/ML
4 INJECTION INTRAMUSCULAR; INTRAVENOUS ONCE AS NEEDED
Status: DISCONTINUED | OUTPATIENT
Start: 2024-10-03 | End: 2024-10-03 | Stop reason: HOSPADM

## 2024-10-03 RX ORDER — PROPOFOL 10 MG/ML
INJECTION, EMULSION INTRAVENOUS CONTINUOUS PRN
Status: DISCONTINUED | OUTPATIENT
Start: 2024-10-03 | End: 2024-10-03

## 2024-10-03 RX ORDER — HYDROMORPHONE HCL IN WATER/PF 6 MG/30 ML
0.2 PATIENT CONTROLLED ANALGESIA SYRINGE INTRAVENOUS
Status: DISCONTINUED | OUTPATIENT
Start: 2024-10-03 | End: 2024-10-03 | Stop reason: HOSPADM

## 2024-10-03 RX ORDER — EPHEDRINE SULFATE 50 MG/ML
INJECTION INTRAVENOUS AS NEEDED
Status: DISCONTINUED | OUTPATIENT
Start: 2024-10-03 | End: 2024-10-03

## 2024-10-03 RX ORDER — MIDAZOLAM HYDROCHLORIDE 2 MG/2ML
2 INJECTION, SOLUTION INTRAMUSCULAR; INTRAVENOUS ONCE
Status: COMPLETED | OUTPATIENT
Start: 2024-10-03 | End: 2024-10-03

## 2024-10-03 RX ORDER — LABETALOL HYDROCHLORIDE 5 MG/ML
10 INJECTION, SOLUTION INTRAVENOUS
Status: DISCONTINUED | OUTPATIENT
Start: 2024-10-03 | End: 2024-10-03 | Stop reason: HOSPADM

## 2024-10-03 RX ORDER — ONDANSETRON 2 MG/ML
INJECTION INTRAMUSCULAR; INTRAVENOUS AS NEEDED
Status: DISCONTINUED | OUTPATIENT
Start: 2024-10-03 | End: 2024-10-03

## 2024-10-03 RX ORDER — OXYCODONE HYDROCHLORIDE 5 MG/1
5 TABLET ORAL EVERY 8 HOURS PRN
Qty: 10 TABLET | Refills: 0 | Status: SHIPPED | OUTPATIENT
Start: 2024-10-03 | End: 2024-10-04 | Stop reason: SDUPTHER

## 2024-10-03 RX ORDER — PROPOFOL 10 MG/ML
INJECTION, EMULSION INTRAVENOUS AS NEEDED
Status: DISCONTINUED | OUTPATIENT
Start: 2024-10-03 | End: 2024-10-03

## 2024-10-03 RX ORDER — ALBUTEROL SULFATE 0.83 MG/ML
2.5 SOLUTION RESPIRATORY (INHALATION) ONCE AS NEEDED
Status: DISCONTINUED | OUTPATIENT
Start: 2024-10-03 | End: 2024-10-03 | Stop reason: HOSPADM

## 2024-10-03 RX ORDER — ACETAMINOPHEN 325 MG/1
650 TABLET ORAL EVERY 6 HOURS PRN
Status: DISCONTINUED | OUTPATIENT
Start: 2024-10-03 | End: 2024-10-03 | Stop reason: HOSPADM

## 2024-10-03 RX ORDER — FLUMAZENIL 0.1 MG/ML
0.2 INJECTION INTRAVENOUS ONCE
Status: COMPLETED | OUTPATIENT
Start: 2024-10-03 | End: 2024-10-03

## 2024-10-03 RX ORDER — SUCCINYLCHOLINE/SOD CL,ISO/PF 100 MG/5ML
SYRINGE (ML) INTRAVENOUS AS NEEDED
Status: DISCONTINUED | OUTPATIENT
Start: 2024-10-03 | End: 2024-10-03

## 2024-10-03 RX ORDER — HYDRALAZINE HYDROCHLORIDE 20 MG/ML
5 INJECTION INTRAMUSCULAR; INTRAVENOUS
Status: DISCONTINUED | OUTPATIENT
Start: 2024-10-03 | End: 2024-10-03 | Stop reason: HOSPADM

## 2024-10-03 RX ORDER — TRAMADOL HYDROCHLORIDE 50 MG/1
50 TABLET ORAL ONCE
Status: COMPLETED | OUTPATIENT
Start: 2024-10-03 | End: 2024-10-03

## 2024-10-03 RX ADMIN — PHENYLEPHRINE HYDROCHLORIDE 30 MCG/MIN: 10 INJECTION INTRAVENOUS at 17:12

## 2024-10-03 RX ADMIN — MIDAZOLAM 2 MG: 1 INJECTION INTRAMUSCULAR; INTRAVENOUS at 14:15

## 2024-10-03 RX ADMIN — PROPOFOL 100 MG: 10 INJECTION, EMULSION INTRAVENOUS at 16:41

## 2024-10-03 RX ADMIN — FENTANYL CITRATE 25 MCG: 50 INJECTION INTRAMUSCULAR; INTRAVENOUS at 19:35

## 2024-10-03 RX ADMIN — Medication 3000 MG: at 16:20

## 2024-10-03 RX ADMIN — SODIUM CHLORIDE, SODIUM LACTATE, POTASSIUM CHLORIDE, AND CALCIUM CHLORIDE 125 ML/HR: .6; .31; .03; .02 INJECTION, SOLUTION INTRAVENOUS at 13:37

## 2024-10-03 RX ADMIN — BUPIVACAINE HYDROCHLORIDE 20 ML: 5 INJECTION, SOLUTION EPIDURAL; INTRACAUDAL; PERINEURAL at 14:15

## 2024-10-03 RX ADMIN — OXYCODONE 5 MG: 5 TABLET ORAL at 19:44

## 2024-10-03 RX ADMIN — PROPOFOL 200 MG: 10 INJECTION, EMULSION INTRAVENOUS at 16:13

## 2024-10-03 RX ADMIN — Medication 100 MG: at 16:13

## 2024-10-03 RX ADMIN — LIDOCAINE HYDROCHLORIDE 50 MG: 10 INJECTION, SOLUTION EPIDURAL; INFILTRATION; INTRACAUDAL; PERINEURAL at 16:13

## 2024-10-03 RX ADMIN — PROPOFOL 50 MG: 10 INJECTION, EMULSION INTRAVENOUS at 18:00

## 2024-10-03 RX ADMIN — FENTANYL CITRATE 100 MCG: 50 INJECTION INTRAMUSCULAR; INTRAVENOUS at 16:42

## 2024-10-03 RX ADMIN — FENTANYL CITRATE 25 MCG: 50 INJECTION INTRAMUSCULAR; INTRAVENOUS at 19:24

## 2024-10-03 RX ADMIN — ONDANSETRON 4 MG: 2 INJECTION INTRAMUSCULAR; INTRAVENOUS at 16:16

## 2024-10-03 RX ADMIN — FENTANYL CITRATE 25 MCG: 50 INJECTION INTRAMUSCULAR; INTRAVENOUS at 19:17

## 2024-10-03 RX ADMIN — FLUMAZENIL 0.2 MG: 0.1 INJECTION INTRAVENOUS at 18:40

## 2024-10-03 RX ADMIN — TRAMADOL HYDROCHLORIDE 50 MG: 50 TABLET, COATED ORAL at 19:04

## 2024-10-03 RX ADMIN — EPHEDRINE SULFATE 10 MG: 50 INJECTION, SOLUTION INTRAVENOUS at 16:53

## 2024-10-03 RX ADMIN — PROPOFOL 50 MCG/KG/MIN: 10 INJECTION, EMULSION INTRAVENOUS at 16:18

## 2024-10-03 RX ADMIN — SODIUM CHLORIDE, SODIUM LACTATE, POTASSIUM CHLORIDE, AND CALCIUM CHLORIDE: .6; .31; .03; .02 INJECTION, SOLUTION INTRAVENOUS at 17:12

## 2024-10-03 NOTE — ANESTHESIA POSTPROCEDURE EVALUATION
Post-Op Assessment Note    CV Status:  Stable    Pain management: adequate       Mental Status:  Alert and awake   Hydration Status:  Euvolemic   PONV Controlled:  Controlled   Airway Patency:  Patent     Post Op Vitals Reviewed: Yes    No anethesia notable event occurred.    Staff: CRNA               BP   132/82   Temp   97.9   Pulse  78   Resp   16   SpO2   97%

## 2024-10-03 NOTE — PROGRESS NOTES
Incoming inbasket ADT alert that pt was admitted to [facility] today for elective surgery on right wrist by Dr Mccarthy.

## 2024-10-03 NOTE — QUICK NOTE
Patient minimally responsive postoperatively.  Blood glucose 110.  SpO2 100% on face mask.  Pupils are not constricted, pupils equal, round, reactive.  No paralytic outside of succinylcholine.  Midazolam given preoperatively for block.  Fentanyl 100mcg total given intra-op.  Flumazenil 0.2mg IV administered for midazolam reversal.  Minimal improvement however now responding to questions, opens eyes slowly to command.  ABG: pH 7.31 / pCO2 50 / pO2 104 on facemask 6L.  Given improvement will continue to monitor.

## 2024-10-03 NOTE — ANESTHESIA PROCEDURE NOTES
Peripheral Block    Patient location during procedure: holding area  Start time: 10/3/2024 2:15 PM  Reason for block: at surgeon's request and post-op pain management  Staffing  Performed by: Pascual Rodríguez MD  Authorized by: Pascual Rodríguez MD    Preanesthetic Checklist  Completed: patient identified, IV checked, site marked, risks and benefits discussed, surgical consent, monitors and equipment checked, pre-op evaluation and timeout performed  Peripheral Block  Patient position: sitting  Prep: ChloraPrep  Patient monitoring: frequent blood pressure checks, continuous pulse oximetry and heart rate  Block type: Supraclavicular  Laterality: right  Injection technique: single-shot  Procedures: ultrasound guided, Ultrasound guidance required for the procedure to increase accuracy and safety of medication placement and decrease risk of complications.  Ultrasound permanent image saved  bupivacaine (PF) (MARCAINE) 0.5 % injection 20 mL - Perineural   20 mL - 10/3/2024 2:15:00 PM  Needle  Needle type: Stimuplex   Needle gauge: 20 G  Needle length: 4 in  Needle localization: anatomical landmarks and ultrasound guidance  Assessment  Injection assessment: incremental injection, frequent aspiration, injected with ease, negative aspiration, negative for heart rate change, no paresthesia on injection, no symptoms of intraneural/intravenous injection and needle tip visualized at all times  Paresthesia pain: none  Post-procedure:  site cleaned  patient tolerated the procedure well with no immediate complications

## 2024-10-03 NOTE — OP NOTE
OPERATIVE REPORT  PATIENT NAME: Yoni Castillo    :  1965  MRN: [id number]  Pt Location: [facility] OR ROOM 03    SURGERY DATE: 10/3/2024    Surgeons and Role:     * Carroll Mccarthy MD - Primary     * Gato Breen PA-C - Assisting    Preop Diagnosis:  Tenosynovitis of finger and hand [M65.949]  Dog bite of right wrist with infection, sequela [S61.551S, L08.9, W54.0XXS]    Post-Op Diagnosis Codes:     * Tenosynovitis of finger and hand [M65.949]     * Dog bite of right wrist with infection, sequela [S61.551S, L08.9, W54.0XXS]    Procedure(s):  Right - Irrigation and debridement right wrist volar and dorsal with extensor and flexor tenosynovectomy.   Right middle finger MP joint arthrotomy with I&D      Specimen(s):  ID Type Source Tests Collected by Time Destination   A : extensor tenosynovium right hand 1 Tissue Hand, Right ANAEROBIC CULTURE AND GRAM STAIN, FUNGAL CULTURE, CULTURE, TISSUE AND GRAM STAIN, AFB CULTURE WITH STAIN Carroll Mccarthy MD 10/3/2024 1627    B : extensor tenosynovium right hand 2 Tissue Hand, Right ANAEROBIC CULTURE AND GRAM STAIN, FUNGAL CULTURE, CULTURE, TISSUE AND GRAM STAIN, AFB CULTURE WITH STAIN Carroll Mccarthy MD 10/3/2024 1628    C : flexor tenosynovium right hand Tissue Hand, Right ANAEROBIC CULTURE AND GRAM STAIN, FUNGAL CULTURE, CULTURE, TISSUE AND GRAM STAIN, AFB CULTURE WITH STAIN Carroll Mccatrhy MD 10/3/2024 1628    D : extensor tenosynovium right hand 3 Tissue Hand, Right ANAEROBIC CULTURE AND GRAM STAIN, FUNGAL CULTURE, CULTURE, TISSUE AND GRAM STAIN, AFB CULTURE WITH STAIN Carroll Mccarthy MD 10/3/2024 1658        Estimated Blood Loss:   100 mL    Drains:  [REMOVED] NG/OG/Enteral Tube Orogastric 18 Fr Center mouth (Removed)   Number of days: 0       Anesthesia Type:   Regional with Sedation    Operative Indications:  Tenosynovitis of finger and hand [M65.949]  Dog bite of right wrist with infection, sequela [S61.551S, L08.9,  W54.0XXS]    59-year-old male with a wrist infection and infection status post dog bite.  He previously underwent a surgical I&D 2 weeks ago.  Unfortunately has failed to progress on oral antibiotics and was indicated for a repeat I&D.    Operative Findings:  Some mild inflamed tenosynovium around the extensor tendons at the wrist with disruption of the extensor retinaculum over the fourth compartment.  Small amount of dusky appearing muscle at the EPL musculotendinous junction and index finger EDC musculotendinous junction.  Otherwise muscle and fascia healthy appearing.    There was some more inflamed tenosynovium and some fibrinous type tissue more distally around the extensor tendons at the level of the MP joints.    No fluid within the ring finger MP joint.    Minimal inflamed tenosynovium surrounding the FCR and palmaris tendon.  FDS FDP tendons intact and normal-appearing.  Median nerve intact.    Complications:   None    Procedure and Technique:  The patient was greeted in the preoperative area. The risks, benefits, and alternatives of the procedure were again discussed in detail with the patient. Risks include pain, stiffness, swelling, injury to neurovascular structure, infection, need for reoperation, and anesthetic complications. The patient was amenable to proceed. The operative extremity was marked. Patient was brought to the operating room and placed under anesthesia. A tourniquet was applied to the operative extremity. The operative extremity was prepped and draped in the usual sterile fashion. 2 g ancef were administered for pre-operative antibiotic prophylaxis. A timeout was performed identifying the name and laterality of the procedure. The limb was gravity exsanguinated and the tourniquet was inflated.     We reopened his dorsal incision extended distally and proximally.  The dissection was carried down to through subcutaneous tissue and scar tissue.  The slightly open area of skin was ellipsed  out sharply using a 15 blade scalpel.  Identified the extensor tendons.  The portion of the repaired extensor retinaculum had been disrupted and scarred preventing further repair.  There is small amount of inflamed tenosynovium surrounding from the EDC tendons and EPL which was excised in the form of an extensor tenosynovectomy.  We continued our dissection distally there is some more fibrinous tissue and inflamed tenosynovium surrounding the extensor tendons more distally near the level of the metacarpal heads.  This was also excised carefully.  Due to his severe ring finger pain with flexion the ring finger MP joint was opened dorsally without any significant fluid or tissue within.    After completing a thorough extensor tenosynovectomy and sharp excisional debridement of unhealthy appearing skin, subcutaneous tissue, muscle, tenosynovium, fascia over an area measuring 10 x 4 cm we thoroughly irrigated with 3 L of saline.  We then took a second look and were satisfied with our debridement.  We then receded volarly.    We utilized the prior incision and extended it proximally along the FCR tendon.  We dissected down through subcutaneous tissue and there is remnants of an old hematoma identified in the superficial subcutaneous tissues over the palmaris tendon.  This was evacuated and debrided excisionally.  There was some tenosynovium around the FCR and palmaris tendons which was excised.  The median nerve was intact and normal-appearing.  The finger flexor tendons were intact and normal-appearing deeply.  We then thoroughly irrigated the volar incision with 3 L of saline.    The tourniquet was released and hemostasis achieved. The wound was loosely closed in an interrupted fashion. Sterile dressings and a volar resting splint were applied. The patient was awoken and transported to the recovery room in stable condition.     I was present for the entire procedure., A qualified resident physician was not available.,  and A physician assistant was required during the procedure for retraction, tissue handling, dissection and suturing.    Patient Disposition:  PACU              SIGNATURE: Carroll Mccarthy MD  DATE: October 3, 2024  TIME: 6:31 PM

## 2024-10-03 NOTE — ANESTHESIA PREPROCEDURE EVALUATION
Procedure:  Irrigation and debridement, right wrist, volar and dorsal, possible extensor and flexor tenosynovectomy, any indicated procedures (Right: Wrist)     - denies any chest pain, palpitations, shortness of breath, syncope, lightheadedness, seizures   - denies any recent infectious symptoms such as fevers, chills, cough   - denies taking any anticoagulation medications or any issues with bleeding, bruising, clotting    Relevant Problems   ANESTHESIA (within normal limits)      CARDIO   (+) Essential hypertension      ENDO   (+) Type 2 diabetes mellitus with chronic kidney disease, without long-term current use of insulin (HCC)      GI/HEPATIC (within normal limits)      /RENAL   (+) Chronic kidney disease, stage 3 (HCC)      GYN (within normal limits)      HEMATOLOGY (within normal limits)      MUSCULOSKELETAL   (+) Low back pain with sciatica      NEURO/PSYCH (within normal limits)      PULMONARY (within normal limits)      Lab Results   Component Value Date    WBC 5.68 09/21/2024    HGB 9.9 (L) 09/21/2024    HCT 30.2 (L) 09/21/2024    MCV 95 09/21/2024     09/21/2024     Lab Results   Component Value Date    SODIUM 137 09/21/2024    K 3.8 09/21/2024     09/21/2024    CO2 28 09/21/2024    AGAP 7 09/21/2024    BUN 23 09/21/2024    CREATININE 1.47 (H) 09/21/2024    GLUC 109 09/21/2024    GLUF 130 (H) 09/12/2024    CALCIUM 8.7 09/21/2024    AST 18 09/20/2024    ALT 17 09/20/2024    ALKPHOS 45 09/20/2024    TP 6.5 09/20/2024    TBILI 0.27 09/20/2024    EGFR 50 09/21/2024     Lab Results   Component Value Date    PTT 30 05/31/2022     Lab Results   Component Value Date    INR 1.14 05/31/2022    INR 1.11 03/29/2022    PROTIME 14.3 05/31/2022    PROTIME 14.0 03/29/2022            Anesthesia Plan  ASA Score- 3     Anesthesia Type- general with ASA Monitors.         Additional Monitors:     Airway Plan:     Comment: Supraclavicular nerve block with GA/ETT due to habitus and recent trulicity use.        Plan Factors-Exercise tolerance (METS): >4 METS.    Chart reviewed. EKG reviewed.  Existing labs reviewed. Patient summary reviewed.    Patient is not a current smoker.  Patient did not smoke on day of surgery.    Obstructive sleep apnea risk education given perioperatively.        Induction- intravenous.    Postoperative Plan-         Informed Consent- Anesthetic plan and risks discussed with patient.  I personally reviewed this patient with the CRNA. Discussed and agreed on the Anesthesia Plan with the CRNA..

## 2024-10-03 NOTE — DISCHARGE INSTR - AVS FIRST PAGE
POST-OPERATIVE INSTRUCTIONS  Hand/Wrist I&D    You have just undergone a hand/wrist I&D by Dr. Mccarthy in the operating room.  It is our wish that your postoperative recovery be as quick and comfortable as possible.  Please carefully review the following items that are important in your recovery.    Pain Control:  After any operation, a certain degree of pain is to be expected.  A prescription for narcotic pain medication as well as acetaminophen and/or ibruprofen has been electronically sent to your pharmacy. Do not exceed 3000 mg of Tylenol per day. This medication will relieve most of your pain but may not relieve all of the pain. Some pain is normal post operatively.     When you go home, please keep your operated arm elevated at all times (above the level of your heart.)  If you do this, your swelling will be diminished and your pain will be diminished as well.      Antibiotics:  Please take your antibiotics as prescribed. Complete your entire prescribed course.       Dressing Care:  On the second day after surgery you may remove your operative bandage and start soaks. Remove the bandage and soak the operative hand in a mixture of warm soapy water for 10 minutes, 2-3 times a day. Then replace the bandage with antibiotic ointment, adaptic (or similar), gauze and an ace wrap of rolled guaze (e.g. veronica). Continue this daily until you see Dr. Mccarthy in follow up.       Weight Bearing:  You should NOT bear weight >5lbs through your operative extremity. Do not push off using your operative extremity.     It is ok to move your fingers as tolerated to prevent stiffness. You may also use your operative hand to assist with light activities of daily living such as putting on clothes, brushing your teeth and eating as tolerated      Follow Up:  If you don't already have a scheduled postoperative appointment, please call our office for a follow-up appointment. It is best to call the day after surgery to make an  appointment in 7 days.  At your first postoperative visit, you will be seen by either Dr. Mccarthy, his PA; or one of their associates. The sutures will be removed and you may be asked to see a hand therapist to optimize your functional result. Each of the hand therapists that you will be referred to have received special training in the care of the hand and upper extremity.      When to Call:  It is normal to see minor staining on the hand surgery dressing after surgery. If there is significant bleeding, you are advised to call the office during regular office hours to have this checked.     If you feel that you have a surgical emergency postoperatively that requires immediate attention, please call 9-1-1. In addition, any emergency can also be handled by the emergency room.      If you have questions regarding your surgery postop that you feel requires attention, please call the office.   If this occurs after our regular office hours, there is a message with instructions to talk to the physician on call.

## 2024-10-03 NOTE — INTERVAL H&P NOTE
H&P reviewed. After examining the patient I find no changes in the patients condition since the H&P had been written.    Vitals:    10/03/24 1815   BP: 133/84   Pulse: 80   Resp: 18   Temp: 97.9 °F (36.6 °C)   SpO2: 98%

## 2024-10-04 ENCOUNTER — PATIENT OUTREACH (OUTPATIENT)
Dept: CASE MANAGEMENT | Facility: OTHER | Age: 59
End: 2024-10-04

## 2024-10-04 ENCOUNTER — TELEPHONE (OUTPATIENT)
Dept: OBGYN CLINIC | Facility: HOSPITAL | Age: 59
End: 2024-10-04

## 2024-10-04 ENCOUNTER — TELEPHONE (OUTPATIENT)
Dept: OBGYN CLINIC | Facility: MEDICAL CENTER | Age: 59
End: 2024-10-04

## 2024-10-04 DIAGNOSIS — M65.90 TENOSYNOVITIS: ICD-10-CM

## 2024-10-04 DIAGNOSIS — G89.18 ACUTE POST-OPERATIVE PAIN: Primary | ICD-10-CM

## 2024-10-04 LAB
RHODAMINE-AURAMINE STN SPEC: NORMAL

## 2024-10-04 RX ORDER — OXYCODONE HYDROCHLORIDE 5 MG/1
10 TABLET ORAL EVERY 4 HOURS PRN
Qty: 30 TABLET | Refills: 0 | Status: SHIPPED | OUTPATIENT
Start: 2024-10-04 | End: 2024-10-07 | Stop reason: SDUPTHER

## 2024-10-04 NOTE — TELEPHONE ENCOUNTER
Spoke to pt, he states he is keeping his hand elevated above his heart, has take 3 oxycodone since midnight and his pain level is still 10/10. He states he was not advised to take Tylenol and/or ibuprofen. I see on the AVS that he may have been prescribed tylenol and/or ibuprofen but I don't see it on his med list so I wasn't sure if there was some reason why he couldn't take at least tylenol?. Please advise, thanks!

## 2024-10-04 NOTE — TELEPHONE ENCOUNTER
Called patient to return his call given his continued severe pain in the hand and wrist.  He says that has been taking the oxycodone 1 at a time every 4-5 hours.  We did discuss that in addition to the regular Tylenol (975 mg every 8 hours) he can take 2 oxycodone tablets every 4-6 hours as needed until his pain starts to subside.  He also feels that his splint is maybe a little bit tight.  Recommended he could loosen the Ace wrap to decrease some of the pressure on his wrist as he feels that when he offloads the wrist he feels better.  I will send an updated oxycodone prescription as with his new regimen he will run out of medication quickly.    He will call back if he continues to have issues with pain control.  All questions answered.    Carroll Mccarthy MD

## 2024-10-04 NOTE — TELEPHONE ENCOUNTER
Caller: Patient     Doctor: Shari     Reason for call: Patient asked to speak to the nurse again re: PO issue    Call back#: [phone]

## 2024-10-04 NOTE — TELEPHONE ENCOUNTER
Called and spoke with pt, he states his pain is not improving, he cannot get comfortable. He is doing ice, elevating, oxycodone 10 mg and tylenol with pain still 8/10. He states he cannot even rest his hand down he can only keep it suspended in mid air. Please advise, thanks!

## 2024-10-04 NOTE — TELEPHONE ENCOUNTER
Called and spoke with pt and relayed Dr Mccarthy's message. He stated understanding. Also said he thought he was supposed to have 10mg of oxycodone not 5 mg prescribed so he is taking 10 mg at a time. He will call back if Tylenol not helping

## 2024-10-04 NOTE — TELEPHONE ENCOUNTER
Caller: Ed (Patient)    Doctor: Shari    Reason for call: Patient states his pain medication is not helping. He said the pain medication was suppose to last for 8 hours and it did not. He is Oxycodone 10 mg. He has taken 2 since midnight and said his pain is really bad.    States only thing he took was his antibiotic and pain medication.     Call back#: [phone]

## 2024-10-04 NOTE — PROGRESS NOTES
ADT alert received for covering RN CM and chart review completed. Pt discharged for planned surgical procedure 10/3.   Call placed to Ed for follow up care management. Introduced self as covering RN GWENDOLYN Pennington.     Ed states he is still having very severe pain at surgery site. He called Dr. Holder office to report symptoms. He was instructed to continue to take oxycodone and tylenol. Pt was instructed to call office if refills needed.   He has not taken dressing off but reports no discoloration in fingertips. He states that he will remove dressing as instructed tomorrow and monitor for increased redness, swelling. He continues to use ice to the area.   Discussed s/s at length and he knows when to call provider.     He is aware Heike Pennington RN CM will be OOO. Provided contact information for covering RN CM.

## 2024-10-06 DIAGNOSIS — G89.18 ACUTE POST-OPERATIVE PAIN: ICD-10-CM

## 2024-10-06 DIAGNOSIS — M65.90 TENOSYNOVITIS: ICD-10-CM

## 2024-10-06 DIAGNOSIS — Z47.89 AFTERCARE FOLLOWING SURGERY OF THE MUSCULOSKELETAL SYSTEM: ICD-10-CM

## 2024-10-06 LAB
BACTERIA SPEC ANAEROBE CULT: ABNORMAL
BACTERIA SPEC ANAEROBE CULT: ABNORMAL
BACTERIA SPEC ANAEROBE CULT: NO GROWTH
BACTERIA TISS AEROBE CULT: NO GROWTH
GRAM STN SPEC: NORMAL

## 2024-10-06 RX ORDER — OXYCODONE HYDROCHLORIDE 5 MG/1
10 TABLET ORAL EVERY 4 HOURS PRN
Qty: 30 TABLET | Refills: 0 | Status: CANCELLED | OUTPATIENT
Start: 2024-10-06

## 2024-10-07 DIAGNOSIS — Z47.89 AFTERCARE FOLLOWING SURGERY OF THE MUSCULOSKELETAL SYSTEM: Primary | ICD-10-CM

## 2024-10-07 DIAGNOSIS — G89.18 ACUTE POST-OPERATIVE PAIN: ICD-10-CM

## 2024-10-07 DIAGNOSIS — M65.90 TENOSYNOVITIS: ICD-10-CM

## 2024-10-07 LAB
FUNGUS SPEC CULT: NORMAL

## 2024-10-07 RX ORDER — OXYCODONE HYDROCHLORIDE 5 MG/1
10 TABLET ORAL EVERY 4 HOURS PRN
Qty: 30 TABLET | Refills: 0 | Status: SHIPPED | OUTPATIENT
Start: 2024-10-07

## 2024-10-07 RX ORDER — CHLORHEXIDINE GLUCONATE 40 MG/ML
1 SOLUTION TOPICAL DAILY PRN
Qty: 118 ML | Refills: 0 | Status: SHIPPED | OUTPATIENT
Start: 2024-10-07

## 2024-10-07 NOTE — TELEPHONE ENCOUNTER
Caller: Patient    Doctor: Shari    Reason for call: Patient is calling requesting to speak with triage nurse he was previously speaking with, he stated he wanted to give an update on his pain he is experiencing. He also stated Dr Mccarthy wanted to see him in office asap (appt has been scheduled)    Call back#: [phone]

## 2024-10-07 NOTE — TELEPHONE ENCOUNTER
"Called and spoke to pt. He states he is doing \"tons better!\" He is however, running out of wound care supplies. He is requesting the xeroform and chlorhexidine, if possible, sent as a prescription so he does not have to pay out of pocket for them. He also needs a refill on his oxycodone. He finally got ahead of his pain and he has been taking 2 at a time, today he will decrease to 1 and see if that helps but he is almost out of them. He also mentioned how appreciative he is of the office and Dr Mccarthy and how kind and caring everyone is.  "

## 2024-10-07 NOTE — TELEPHONE ENCOUNTER
Medication:  PDMP  10/03/2024 10/03/2024 oxyCODONE HCL (Tablet) 10.0 4 5 MG 18.75 CORBY ABDULLAHI [facility] Commercial [facility] 0 / 0 PA   1 [id number] 09/26/2024 09/26/2024 oxyCODONE HCL (Tablet) 20.0 7 10 MG 42.86 OMKAR GOMEZ [facility] 0 / 0 PA   1 [id number] 09/24/2024 09/24/2024 traMADol HCL (Tablet) 60.0 30 50 MG 20.0 ANDI EDWARDS [facility] Commercial [facility] 0 / 0 PA   1 [id number] 09/21/2024 09/21/2024 oxyCODONE HCL (Tablet) 15.0 5 10 MG 45.0 [facility]  Active agreement on file -

## 2024-10-08 LAB
MYCOBACTERIUM SPEC CULT: NORMAL
RHODAMINE-AURAMINE STN SPEC: NORMAL

## 2024-10-08 RX ORDER — OXYCODONE HYDROCHLORIDE 5 MG/1
10 TABLET ORAL EVERY 4 HOURS PRN
Qty: 30 TABLET | Refills: 0 | OUTPATIENT
Start: 2024-10-08

## 2024-10-08 NOTE — TELEPHONE ENCOUNTER
"Called and spoke with pt, he states he has been unable to get the wound care supplies as they do not have them in stock and are unable to get them. He only has enough for today and he states he is having \"bloody nightmares of having my hand cut off\" He is asking is there an alternative less expensive dressing he can use such as non  stick gauze pads and neosporin since he doesn't have the xeroform or is there any in the office that he could possibly ? Please advise, thanks!  "

## 2024-10-08 NOTE — TELEPHONE ENCOUNTER
Caller: Patient    Doctor: Shari    Reason for call: Requesting to speak with triage nurse about is wound care    Call back#: [phone]

## 2024-10-08 NOTE — TELEPHONE ENCOUNTER
Caller: patient    Doctor: Shari    Reason for call: patient has some concerns regarding the wound care supplies that he would like to speak to speak to a nurse about     Call back#: [phone]

## 2024-10-09 ENCOUNTER — OFFICE VISIT (OUTPATIENT)
Dept: OBGYN CLINIC | Facility: MEDICAL CENTER | Age: 59
End: 2024-10-09

## 2024-10-09 VITALS
HEIGHT: 72 IN | SYSTOLIC BLOOD PRESSURE: 135 MMHG | DIASTOLIC BLOOD PRESSURE: 78 MMHG | WEIGHT: 275 LBS | HEART RATE: 81 BPM | BODY MASS INDEX: 37.25 KG/M2

## 2024-10-09 DIAGNOSIS — M65.90 TENOSYNOVITIS: Primary | ICD-10-CM

## 2024-10-09 DIAGNOSIS — Z47.89 AFTERCARE FOLLOWING SURGERY OF THE MUSCULOSKELETAL SYSTEM: ICD-10-CM

## 2024-10-09 DIAGNOSIS — G89.18 ACUTE POST-OPERATIVE PAIN: ICD-10-CM

## 2024-10-09 PROCEDURE — 99024 POSTOP FOLLOW-UP VISIT: CPT | Performed by: ORTHOPAEDIC SURGERY

## 2024-10-09 NOTE — PROGRESS NOTES
HAND & UPPER EXTREMITY OFFICE VISIT   Referred By:  No referring provider defined for this encounter.      Chief Complaint:     Right wrist pain    Surgery:  Surgery Date: 10/3/2024 - Irrigation and debridement, right wrist, volar and dorsal, possible extensor and flexor tenosynovectomy, any indicated procedures - Right     History of Present Illness:   Patient presents now 6 days status post the above surgery. he was previously having significant pain post-op which was not improving with pain medication. Today he reports doing much better.  He feels that his symptoms have turned the corner. he also reports doing better since pre-op. He has been performing his daily dressing changes and soaks.  He has been taking his oral antibiotics as prescribed.  Denies any side effects or complications with his Augmentin.    Past Medical History:  Past Medical History:   Diagnosis Date    Chronic kidney disease 2012    Chronic pain disorder     Diabetes mellitus (HCC)     H/O eye surgery     High blood sugar     Hypertension     Kidney stone     Liver disease      Past Surgical History:   Procedure Laterality Date    GANGLION CYST EXCISION Left 3/25/2024    Procedure: EXCISION MUCOID CYST, INDEX FINGER DIP;  Surgeon: Carroll Mccarthy MD;  [facility];  Service: Orthopedics    GANGLION CYST EXCISION Right 5/20/2024    Procedure: EXCISION MUCOID CYST - RIGHT INDEX AND MIDDLE FINGERS;  Surgeon: Carroll Mccarthy MD;  Location:  MAIN OR;  Service: Orthopedics    HERNIA REPAIR      INCISION AND DRAINAGE  01/16/2024    KIDNEY SURGERY      KNEE SURGERY  1980    MOUTH SURGERY      ME AMPUTATION METATARSAL W/TOE SINGLE Left 6/1/2022    Procedure: RAY RESECTION FOOT;  Surgeon: Hannah Melgoza DPM;  [facility];  Service: Podiatry    ME INCISION & DRAINAGE ABSCESS COMPLICATED/MULTIPLE Right 9/17/2024    Procedure: Irrigation and debridement right wrist and hand, any indicated procedures;  Surgeon: Carroll  Yoni Mccarthy MD;  [facility];  Service: Orthopedics    WI RPR AA HERNIA 1ST < 3 CM REDUCIBLE N/A 7/14/2023    Procedure: REPAIR HERNIA INCISIONAL;  Surgeon: Matt Melo MD;  [facility];  Service: General    W[place] SYNOVECTOMY EXTENSOR TENDON SHTH WRIST 1 CMPRT Right 10/3/2024    Procedure: Irrigation and debridement, right wrist, volar and dorsal, possible extensor and flexor tenosynovectomy, any indicated procedures;  Surgeon: Carroll Mccarthy MD;  [facility];  Service: Orthopedics    WOUND DEBRIDEMENT Left 4/28/2022    Procedure: Left foot washout;  Surgeon: Hannah Melgoza DPM;  [facility];  Service: Podiatry    WOUND DEBRIDEMENT Left 6/6/2022    Procedure: DEBRIDEMENT WOUND (WASH OUT);  Surgeon: Hannah Melgoza DPM;  [facility];  Service: Podiatry     Family History   Problem Relation Age of Onset    Diabetes Paternal Grandmother     Diabetes Paternal Grandfather      Social History     Socioeconomic History    Marital status: Registered Domestic Partner     Spouse name: Not on file    Number of children: Not on file    Years of education: Not on file    Highest education level: Not on file   Occupational History    Not on file   Tobacco Use    Smoking status: Never    Smokeless tobacco: Never   Vaping Use    Vaping status: Never Used   Substance and Sexual Activity    Alcohol use: Yes     Alcohol/week: 1.0 standard drink of alcohol     Types: 1 Cans of beer per week     Comment: ocassional    Drug use: Never    Sexual activity: Not Currently     Partners: Female     Birth control/protection: Abstinence   Other Topics Concern    Not on file   Social History Narrative    Not on file     Social Determinants of Health     Financial Resource Strain: Not on file   Food Insecurity: No Food Insecurity (9/12/2024)    Hunger Vital Sign     Worried About Running Out of Food in the Last Year: Never true     Ran Out of Food in the Last Year: Never true    Transportation Needs: No Transportation Needs (9/12/2024)    PRAPARE - Transportation     Lack of Transportation (Medical): No     Lack of Transportation (Non-Medical): No   Physical Activity: Not on file   Stress: Not on file   Social Connections: Not on file   Intimate Partner Violence: Not on file   Housing Stability: Low Risk  (9/12/2024)    Housing Stability Vital Sign     Unable to Pay for Housing in the Last Year: No     Number of Times Moved in the Last Year: 0     Homeless in the Last Year: No     Scheduled Meds:  Continuous Infusions:No current facility-administered medications for this visit.    PRN Meds:.  Allergies   Allergen Reactions    Cephalexin Hives     Skin peeling  Tolerates penicillins (tolerated unasyn and zosyn)    Pollen Extract Sneezing    Metformin GI Intolerance       Physical Examination:    /78   Pulse 81   Ht 6' (1.829 m)   Wt 125 kg (275 lb)   BMI 37.30 kg/m²     Gen: A&Ox3, NAD    Right Upper Extremity:  Splint and dressing clean and dry, removed  Incision healing well without signs of infection  Sutures intact  Positive swelling and ecchymosis around incision sites  Improving tenderness over the dorsal and volar wrist  Sensation intact to light touch in the axillary median, ulnar, and radial nerve distributions  Limited finger and wrist ROM due to pain/stiffness, improved since last visit  Warm, well-perfused digits  Cap refill <2s      Studies:  10/3/24: One culture sample growing Staph epidermidis in broth only -susceptible to the majority of antibiotics except Bactrim and penicillin.  Anaerobic Culture No anaerobes isolated      Growth in Broth culture only Staphylococcus epidermidis Abnormal            Susceptibility     Staphylococcus epidermidis     LEDA (Preliminary)     Cefazolin ($) <=8.00 ug/ml Susceptible     Clindamycin ($) <=0.25 ug/ml Susceptible     Erythromycin ($$$$) <=0.25 ug/ml Susceptible     Gentamicin ($$) <=4 ug/ml Susceptible     Oxacillin <=0.25  ug/ml Susceptible     Penicillin 0.500 ug/ml Resistant     Tetracycline <=4 ug/ml Susceptible     Trimethoprim + Sulfamethoxazole ($$$) >2/38 ug/ml Resistant     Vancomycin ($) 1.00 ug/ml Susceptible                         Assessment and Plan:  1. Tenosynovitis        2. Aftercare following surgery of the musculoskeletal system        3. Acute post-operative pain            59 y.o. male presents 6 days status post Irrigation and debridement, right wrist, volar and dorsal, possible extensor and flexor tenosynovectomy, any indicated procedures - Right. He seems to be doing much better since the last visit and after his second surgery. Advised to continue local wound care and daily soaks. he was instructed to complete the entire course of oral antibiotics as prescribed.  Will plan for 4 weeks postoperatively.  Augmentin should still be appropriate given his culture results and susceptibilities.  We did discuss that Staph epidermidis could also be a skin contaminant as opposed to the primary infectious organism.  However given his significant symptoms, diabetes and history with prior infections I think we should still treat this with a longer course of antibiotics.  he should continue to monitor for signs of infection including increased pain, redness, swelling, drainage, fevers/chills. It is recommended he return to the office at his regularly scheduled post op visit in 1 week.     he expressed understanding of the plan and agreed. We encouraged them to contact our office with any questions or concerns.         Carroll Mccarthy MD  Hand and Upper Extremity Surgery          *This note was dictated using Dragon voice recognition software. Please excuse any word substitutions or errors.*      Scribe Attestation      I,:  Yesenia Carrasquillo PA-C am acting as a scribe while in the presence of the attending physician.:       I,:  Carroll Mccarthy MD personally performed the services described in this documentation    as  scribed in my presence.:

## 2024-10-10 ENCOUNTER — CONSULT (OUTPATIENT)
Dept: UROLOGY | Facility: AMBULATORY SURGERY CENTER | Age: 59
End: 2024-10-10

## 2024-10-10 VITALS
BODY MASS INDEX: 37.25 KG/M2 | WEIGHT: 275 LBS | OXYGEN SATURATION: 92 % | DIASTOLIC BLOOD PRESSURE: 84 MMHG | SYSTOLIC BLOOD PRESSURE: 130 MMHG | HEIGHT: 72 IN | HEART RATE: 77 BPM

## 2024-10-10 DIAGNOSIS — N20.0 STAGHORN CALCULUS: ICD-10-CM

## 2024-10-10 NOTE — ASSESSMENT & PLAN NOTE
Patient seen in consultation in the hospital during a admission on 9/16/2024  CT scan from 3/6/2024 demonstrating large staghorn calculus in the right renal pelvis measuring up to 2.8 cm, there is no hydronephrosis noted, I did personally review these images  CMP on 9/12/2024: Creat 1.33, BUN 20, GFR 57, kidney function at baseline  I discussed with the patient both ureteroscopy with laser lithotripsy and PCNL, I did discuss with him that with the size of his stone it is very possible and likely that he would require a staged ureteroscopy procedure, I also discussed that a PCNL sometimes yields a higher chance of becoming stone free on the first attempt, but I did discuss that a PCNL may also require a additional second procedure, he states that he would be more interested in pursuing a PCNL approach  I recommend that he follow-up with a physician in the office to discuss plan of care and surgical intervention, he is amenable to this plan

## 2024-10-10 NOTE — PROGRESS NOTES
"  Assessment and plan:     Staghorn calculus  Patient seen in consultation in the hospital during a admission on 9/16/2024  CT scan from 3/6/2024 demonstrating large staghorn calculus in the right renal pelvis measuring up to 2.8 cm, there is no hydronephrosis noted, I did personally review these images  CMP on 9/12/2024: Creat 1.33, BUN 20, GFR 57, kidney function at baseline  I discussed with the patient both ureteroscopy with laser lithotripsy and PCNL, I did discuss with him that with the size of his stone it is very possible and likely that he would require a staged ureteroscopy procedure, I also discussed that a PCNL sometimes yields a higher chance of becoming stone free on the first attempt, but I did discuss that a PCNL may also require a additional second procedure, he states that he would be more interested in pursuing a PCNL approach  I recommend that he follow-up with a physician in the office to discuss plan of care and surgical intervention, he is amenable to this plan      History of Present Illness     Yoni Castillo is a 59 y.o. male who presents today to the office as a new patient for further discussion of a right sided staghorn calculus.  He was seen in consultation in the ER 9/16/2020 for for further evaluation of a staghorn calculus with no hydronephrosis.    Today in the office he states he is overall doing okay.  He still states that he believes that he did pass kidney stones in the past.  He states that he never required any surgical intervention for this.  He does state that approximately 12 years ago he had a problem with his right kidney \"rupturing\".  He states that this required surgery to see where the bleeding was coming from but he states that he was told nothing was found.  I was not able to review any records regarding this.  He states he has not had any issues since that time.    He states that he will occasionally have flank pain on the right side.  He states that he mostly " "notices the discomfort when he is doing activities.  He denies any hematuria, dysuria, urinary urgency or frequency or suprapubic pain.      Laboratory     Lab Results   Component Value Date    BUN 23 09/21/2024    CREATININE 1.47 (H) 09/21/2024       No components found for: \"GFR\"    Lab Results   Component Value Date    GLUCOSE 112 10/03/2024    CALCIUM 8.7 09/21/2024    K 3.8 09/21/2024    CO2 27 10/03/2024     09/21/2024       Lab Results   Component Value Date    WBC 5.68 09/21/2024    HGB 10.9 (L) 10/03/2024    HCT 32 (L) 10/03/2024    MCV 95 09/21/2024     09/21/2024       Lab Results   Component Value Date    PSA 0.6 09/16/2022    PSA 0.6 07/26/2022       No results found for this or any previous visit (from the past 1 hour(s)).    Review of Systems     Review of Systems   Constitutional:  Negative for chills and fever.   Respiratory: Negative.  Negative for cough and shortness of breath.    Cardiovascular: Negative.  Negative for chest pain.   Gastrointestinal: Negative.  Negative for abdominal distention, abdominal pain, nausea and vomiting.   Genitourinary:  Negative for decreased urine volume, difficulty urinating, dysuria, flank pain, frequency, hematuria, penile discharge, penile pain, penile swelling, scrotal swelling, testicular pain and urgency.   Skin: Negative.  Negative for rash.   Neurological: Negative.    Hematological:  Negative for adenopathy. Does not bruise/bleed easily.       AUA SYMPTOM SCORE      Flowsheet Row Most Recent Value   AUA SYMPTOM SCORE    How often have you had a sensation of not emptying your bladder completely after you finished urinating? 0 (P)     How often have you had to urinate again less than two hours after you finished urinating? 0 (P)     How often have you found you stopped and started again several times when you urinate? 0 (P)     How often have you found it difficult to postpone urination? 0 (P)     How often have you had a weak urinary stream? 0 " (P)     How often have you had to push or strain to begin urination? 0 (P)     How many times did you most typically get up to urinate from the time you went to bed at night until the time you got up in the morning? 0 (P)     Quality of Life: If you were to spend the rest of your life with your urinary condition just the way it is now, how would you feel about that? 1 (P)     AUA SYMPTOM SCORE 0 (P)                    Allergies     Allergies   Allergen Reactions    Cephalexin Hives     Skin peeling  Tolerates penicillins (tolerated unasyn and zosyn)    Pollen Extract Sneezing    Metformin GI Intolerance       Physical Exam     Physical Exam  Vitals reviewed.   Constitutional:       Appearance: Normal appearance.   HENT:      Head: Normocephalic and atraumatic.   Eyes:      Pupils: Pupils are equal, round, and reactive to light.   Cardiovascular:      Rate and Rhythm: Normal rate.   Pulmonary:      Effort: Pulmonary effort is normal.   Musculoskeletal:      Cervical back: Normal range of motion.   Skin:     General: Skin is warm and dry.   Neurological:      General: No focal deficit present.      Mental Status: He is alert and oriented to person, place, and time.   Psychiatric:         Mood and Affect: Mood normal.         Behavior: Behavior normal.         Thought Content: Thought content normal.         Judgment: Judgment normal.         Vital Signs     Vitals:    10/10/24 1000   BP: 130/84   BP Location: Left arm   Patient Position: Sitting   Cuff Size: Large   Pulse: 77   SpO2: 92%   Weight: 125 kg (275 lb)   Height: 6' (1.829 m)       Current Medications       Current Outpatient Medications:     Accu-Chek FastClix Lancets MISC, Use to test blood sugar once a day, Disp: 102 each, Rfl: 0    acetaminophen (TYLENOL) 500 mg tablet, Take 2 tablets (1,000 mg total) by mouth every 6 (six) hours as needed for mild pain (Patient taking differently: Take 2,500 mg by mouth 2 (two) times a day), Disp: 60 tablet, Rfl: 0     Acetylcysteine (NAC PO), Take 300 mg by mouth 2 (two) times a day, Disp: , Rfl:     Alpha-Lipoic Acid 600 MG CAPS, Take 600 mg by mouth 2 (two) times a day  , Disp: , Rfl:     ammonium lactate (LAC-HYDRIN) 12 % lotion, , Disp: , Rfl:     amoxicillin-clavulanate (AUGMENTIN) 875-125 mg per tablet, Take 1 tablet by mouth every 12 (twelve) hours for 14 days, Disp: 28 tablet, Rfl: 0    Apple Cider Vinegar 300 MG TABS, Take 300 mg by mouth 2 (two) times a day, Disp: , Rfl:     Ascorbic Acid (vitamin C) 1000 MG tablet, Take 1,000 mg by mouth 2 (two) times a day 2 tablet twice a day, Disp: , Rfl:     BENFOTIAMINE PO, Take 300 mg by mouth 2 (two) times a day, Disp: , Rfl:     beta carotene 30 MG capsule, Take 30 mg by mouth 2 (two) times a day  , Disp: , Rfl:     Bismuth Tribromoph-Petrolatum (Xeroform Oil Emulsion Strip) MISC, Apply topically 3 (three) times a day as needed (As needed for dressing changes), Disp: 50 each, Rfl: 0    Blood Glucose Monitoring Suppl (Accu-Chek Guide Me) w/Device KIT, Use to check blood sugar once a day, Disp: 1 kit, Rfl: 0    carvedilol (COREG) 25 mg tablet, Take 1 tablet (25 mg total) by mouth 2 (two) times a day with meals, Disp: 200 tablet, Rfl: 1    chlorhexidine gluconate (HIBICLENS) 4 % external liquid, Apply 1 Application topically daily as needed for wound care, Disp: 118 mL, Rfl: 0    Chromium Picolinate 200 MCG TABS, Take 200 mcg by mouth 2 (two) times a day, Disp: , Rfl:     docusate sodium (COLACE) 100 mg capsule, Take 1 capsule (100 mg total) by mouth in the morning, Disp: 10 capsule, Rfl: 0    dulaglutide (Trulicity) 0.75 MG/0.5ML injection, Inject 0.5 mL (0.75 mg total) under the skin every 7 days, Disp: 2 mL, Rfl: 5    Empagliflozin (Jardiance) 25 MG TABS, Take 1 tablet (25 mg total) by mouth daily, Disp: 90 tablet, Rfl: 1    Garlic 1200 MG CAPS, Take by mouth 2 (two) times a day , Disp: , Rfl:     glucose blood (Accu-Chek Guide) test strip, Use to check blood sugar once a day,  Disp: 100 strip, Rfl: 0    IRON, FERROUS SULFATE, PO, Take 25 mg by mouth 2 (two) times a day, Disp: , Rfl:     ketoconazole (NIZORAL) 2 % cream, , Disp: , Rfl:     ketorolac (ACULAR) 0.5 % ophthalmic solution, , Disp: , Rfl:     lidocaine (Lidoderm) 5 %, Apply 1 patch topically over 12 hours daily Remove & Discard patch within 12 hours or as directed by MD, Disp: 30 patch, Rfl: 0    lisinopril-hydrochlorothiazide (PRINZIDE,ZESTORETIC) 20-12.5 MG per tablet, Take 1 tablet by mouth 2 (two) times a day, Disp: 200 tablet, Rfl: 1    Lutein-Bilberry (LUTEIN PLUS BILBERRY PO), Take by mouth 2 (two) times a day, Disp: , Rfl:     methocarbamol (ROBAXIN) 750 mg tablet, Take 2 tablets (1,500 mg total) by mouth every 8 (eight) hours, Disp: 30 tablet, Rfl: 0    NON FORMULARY, Take 150 mg by mouth 2 (two) times a day BLUEBERRY, Disp: , Rfl:     oxyCODONE (ROXICODONE) 5 immediate release tablet, Take 2 tablets (10 mg total) by mouth every 4 (four) hours as needed for severe pain Max Daily Amount: 60 mg, Disp: 30 tablet, Rfl: 0    Pyridoxine HCl (B-6 PO), Take by mouth 2 (two) times a day, Disp: , Rfl:     TART CHERRY PO, Take 150 mg by mouth 2 (two) times a day, Disp: , Rfl:     traMADol (Ultram) 50 mg tablet, Take 2 tablets (100 mg total) by mouth daily at bedtime as needed for severe pain 2 tablets in evening as needed for severe pain relief, Disp: 60 tablet, Rfl: 0    Turmeric (QC TUMERIC COMPLEX PO), Take 1,000 mg by mouth 2 (two) times a day Tumeric /curcimin, Disp: , Rfl:     VITAMIN D PO, Take by mouth 2 (two) times a day, Disp: , Rfl:     vitamin E, tocopherol, 400 units capsule, Take 400 Units by mouth 2 (two) times a day, Disp: , Rfl:     Zinc 50 MG CAPS, Take by mouth 2 (two) times a day , Disp: , Rfl:     Active Problems     Patient Active Problem List   Diagnosis    Essential hypertension    Type 2 diabetes mellitus with chronic kidney disease, without long-term current use of insulin (HCC)    Class 2 severe obesity  with serious comorbidity and body mass index (BMI) of 35.0 to 35.9 in adult (HCC)    Low back pain with sciatica    Cervical radiculopathy    Neuropathy    Chronic kidney disease, stage 3 (HCC)    Ventral hernia without obstruction or gangrene    Incisional hernia without obstruction or gangrene    Acquired absence of other left toe(s) (HCC)    Dupuytren's disease of finger with nodules without contracture    Cellulitis of hand, right    Dog bite    Staghorn calculus    Mucoid cyst of joint    Preop exam for internal medicine    Tenosynovitis    Obesity (BMI 30-39.9)       Past Medical History     Past Medical History:   Diagnosis Date    Chronic kidney disease 2012    Chronic pain disorder     Diabetes mellitus (HCC)     H/O eye surgery     High blood sugar     Hypertension     Kidney stone     Liver disease        Surgical History     Past Surgical History:   Procedure Laterality Date    GANGLION CYST EXCISION Left 3/25/2024    Procedure: EXCISION MUCOID CYST, INDEX FINGER DIP;  Surgeon: Carroll Mccarthy MD;  [facility];  Service: Orthopedics    GANGLION CYST EXCISION Right 5/20/2024    Procedure: EXCISION MUCOID CYST - RIGHT INDEX AND MIDDLE FINGERS;  Surgeon: Carroll Mccarthy MD;  [facility];  Service: Orthopedics    HERNIA REPAIR      INCISION AND DRAINAGE  01/16/2024    KIDNEY SURGERY      KNEE SURGERY  1980    MOUTH SURGERY      [place] AMPUTATION METATARSAL W/TOE SINGLE Left 6/1/2022    Procedure: RAY RESECTION FOOT;  Surgeon: Hannah Melgoza DPM;  [facility];  Service: Podiatry    NY INCISION & DRAINAGE ABSCESS COMPLICATED/MULTIPLE Right 9/17/2024    Procedure: Irrigation and debridement right wrist and hand, any indicated procedures;  Surgeon: Carroll Mccarthy MD;  [facility];  Service: Orthopedics    NY RPR AA HERNIA 1ST < 3 CM REDUCIBLE N/A 7/14/2023    Procedure: REPAIR HERNIA INCISIONAL;  Surgeon: Matt Melo MD;  [facility];   Service: General    AK SYNOVECTOMY EXTENSOR TENDON SHTH WRIST 1 CMPRT Right 10/3/2024    Procedure: Irrigation and debridement, right wrist, volar and dorsal, possible extensor and flexor tenosynovectomy, any indicated procedures;  Surgeon: Carroll Mccarthy MD;  [facility];  Service: Orthopedics    WOUND DEBRIDEMENT Left 4/28/2022    Procedure: Left foot washout;  Surgeon: Hannah Melgoza DPM;  [facility];  Service: Podiatry    WOUND DEBRIDEMENT Left 6/6/2022    Procedure: DEBRIDEMENT WOUND (WASH OUT);  Surgeon: Hannah Melgoza DPM;  [facility];  Service: Podiatry       Family History     Family History   Problem Relation Age of Onset    Diabetes Paternal Grandmother     Diabetes Paternal Grandfather        Social History     Social History     Social History     Tobacco Use   Smoking Status Never   Smokeless Tobacco Never       Past Surgical History:   Procedure Laterality Date    GANGLION CYST EXCISION Left 3/25/2024    Procedure: EXCISION MUCOID CYST, INDEX FINGER DIP;  Surgeon: Carroll Mccarthy MD;  [facility];  Service: Orthopedics    GANGLION CYST EXCISION Right 5/20/2024    Procedure: EXCISION MUCOID CYST - RIGHT INDEX AND MIDDLE FINGERS;  Surgeon: Carroll Mccarthy MD;  [facility];  Service: Orthopedics    HERNIA REPAIR      INCISION AND DRAINAGE  01/16/2024    KIDNEY SURGERY      KNEE SURGERY  1980    MOUTH SURGERY      AK AMPUTATION METATARSAL W/TOE SINGLE Left 6/1/2022    Procedure: RAY RESECTION FOOT;  Surgeon: Hannah Melgoza DPM;  [facility];  Service: Podiatry    AK INCISION & DRAINAGE ABSCESS COMPLICATED/MULTIPLE Right 9/17/2024    Procedure: Irrigation and debridement right wrist and hand, any indicated procedures;  Surgeon: Carroll Mccarthy MD;  [facility];  Service: Orthopedics    AK RPR AA HERNIA 1ST < 3 CM REDUCIBLE N/A 7/14/2023    Procedure: REPAIR HERNIA INCISIONAL;  Surgeon: Matt Melo MD;   [facility];  Service: General    [place] SYNOVECTOMY EXTENSOR TENDON SHTH WRIST 1 CMPRT Right 10/3/2024    Procedure: Irrigation and debridement, right wrist, volar and dorsal, possible extensor and flexor tenosynovectomy, any indicated procedures;  Surgeon: Carroll Mccarthy MD;  [facility];  Service: Orthopedics    WOUND DEBRIDEMENT Left 4/28/2022    Procedure: Left foot washout;  Surgeon: Hannah Melgoza DPM;  [facility];  Service: Podiatry    WOUND DEBRIDEMENT Left 6/6/2022    Procedure: DEBRIDEMENT WOUND (WASH OUT);  Surgeon: Hannah Melgoza DPM;  [facility];  Service: Podiatry         The following portions of the patient's history were reviewed and updated as appropriate: allergies, current medications, past family history, past medical history, past social history, past surgical history and problem list    Please note :  Voice dictation software has been used to create this document.  There may be inadvertent transcription errors.    JAN Redman

## 2024-10-14 LAB
FUNGUS SPEC CULT: NORMAL

## 2024-10-15 DIAGNOSIS — M51.369 DDD (DEGENERATIVE DISC DISEASE), LUMBAR: ICD-10-CM

## 2024-10-15 LAB
MYCOBACTERIUM SPEC CULT: NORMAL
RHODAMINE-AURAMINE STN SPEC: NORMAL

## 2024-10-15 RX ORDER — METHOCARBAMOL 750 MG/1
1500 TABLET, FILM COATED ORAL EVERY 8 HOURS SCHEDULED
Qty: 30 TABLET | Refills: 0 | Status: SHIPPED | OUTPATIENT
Start: 2024-10-15

## 2024-10-17 ENCOUNTER — OFFICE VISIT (OUTPATIENT)
Dept: OBGYN CLINIC | Facility: MEDICAL CENTER | Age: 59
End: 2024-10-17

## 2024-10-17 VITALS
WEIGHT: 275 LBS | HEIGHT: 72 IN | DIASTOLIC BLOOD PRESSURE: 81 MMHG | SYSTOLIC BLOOD PRESSURE: 124 MMHG | HEART RATE: 88 BPM | BODY MASS INDEX: 37.25 KG/M2

## 2024-10-17 DIAGNOSIS — M65.949 TENOSYNOVITIS OF FINGER AND HAND: ICD-10-CM

## 2024-10-17 DIAGNOSIS — S61.452D ANIMAL BITE OF LEFT HAND WITH INFECTION, SUBSEQUENT ENCOUNTER: ICD-10-CM

## 2024-10-17 DIAGNOSIS — L08.9 ANIMAL BITE OF LEFT HAND WITH INFECTION, SUBSEQUENT ENCOUNTER: ICD-10-CM

## 2024-10-17 DIAGNOSIS — Z47.89 AFTERCARE FOLLOWING SURGERY OF THE MUSCULOSKELETAL SYSTEM: Primary | ICD-10-CM

## 2024-10-17 PROCEDURE — 99024 POSTOP FOLLOW-UP VISIT: CPT | Performed by: ORTHOPAEDIC SURGERY

## 2024-10-17 NOTE — PROGRESS NOTES
HAND & UPPER EXTREMITY OFFICE VISIT   Referred By:  No referring provider defined for this encounter.      Chief Complaint:     Right wrist pain    Surgery:  Surgery Date: 10/3/2024 - Irrigation and debridement, right wrist, volar and dorsal, possible extensor and flexor tenosynovectomy, any indicated procedures - Right     History of Present Illness:   Patient presents now 14 days status post the above surgery. he reports doing ok since the last visit. He is still currently taking his oral antibiotics. He has been doing his daily dressing changes and wearing the splint for rest. He does still have pain and trouble sleeping at night.     Past Medical History:  Past Medical History:   Diagnosis Date    Chronic kidney disease 2012    Chronic pain disorder     Diabetes mellitus (HCC)     H/O eye surgery     High blood sugar     Hypertension     Kidney stone     Liver disease      Past Surgical History:   Procedure Laterality Date    GANGLION CYST EXCISION Left 3/25/2024    Procedure: EXCISION MUCOID CYST, INDEX FINGER DIP;  Surgeon: Carroll Mccarthy MD;  [facility];  Service: Orthopedics    GANGLION CYST EXCISION Right 5/20/2024    Procedure: EXCISION MUCOID CYST - RIGHT INDEX AND MIDDLE FINGERS;  Surgeon: Carroll Mccarthy MD;  [facility];  Service: Orthopedics    HERNIA REPAIR      INCISION AND DRAINAGE  01/16/2024    KIDNEY SURGERY      KNEE SURGERY  1980    MOUTH SURGERY      NY AMPUTATION METATARSAL W/TOE SINGLE Left 6/1/2022    Procedure: RAY RESECTION FOOT;  Surgeon: Hannah Melgoza DPM;  [facility];  Service: Podiatry    NY INCISION & DRAINAGE ABSCESS COMPLICATED/MULTIPLE Right 9/17/2024    Procedure: Irrigation and debridement right wrist and hand, any indicated procedures;  Surgeon: Carroll Mccarthy MD;  [facility];  Service: Orthopedics    NY RPR AA HERNIA 1ST < 3 CM REDUCIBLE N/A 7/14/2023    Procedure: REPAIR HERNIA INCISIONAL;  Surgeon: Matt Vallejo  MD Lorie;  [facility];  Service: General    AR SYNOVECTOMY EXTENSOR TENDON SHTH WRIST 1 CMPRT Right 10/3/2024    Procedure: Irrigation and debridement, right wrist, volar and dorsal, possible extensor and flexor tenosynovectomy, any indicated procedures;  Surgeon: Carroll Mccarthy MD;  [facility];  Service: Orthopedics    WOUND DEBRIDEMENT Left 4/28/2022    Procedure: Left foot washout;  Surgeon: Hannah Melgoza DPM;  [facility];  Service: Podiatry    WOUND DEBRIDEMENT Left 6/6/2022    Procedure: DEBRIDEMENT WOUND (WASH OUT);  Surgeon: Hannah Melgoza DPM;  [facility];  Service: Podiatry     Family History   Problem Relation Age of Onset    Diabetes Paternal Grandmother     Diabetes Paternal Grandfather      Social History     Socioeconomic History    Marital status: Registered Domestic Partner     Spouse name: Not on file    Number of children: Not on file    Years of education: Not on file    Highest education level: Not on file   Occupational History    Not on file   Tobacco Use    Smoking status: Never    Smokeless tobacco: Never   Vaping Use    Vaping status: Never Used   Substance and Sexual Activity    Alcohol use: Yes     Alcohol/week: 1.0 standard drink of alcohol     Types: 1 Cans of beer per week     Comment: ocassional    Drug use: Never    Sexual activity: Not Currently     Partners: Female     Birth control/protection: Abstinence   Other Topics Concern    Not on file   Social History Narrative    Not on file     Social Determinants of Health     Financial Resource Strain: Not on file   Food Insecurity: No Food Insecurity (9/12/2024)    Hunger Vital Sign     Worried About Running Out of Food in the Last Year: Never true     Ran Out of Food in the Last Year: Never true   Transportation Needs: No Transportation Needs (9/12/2024)    PRAPARE - Transportation     Lack of Transportation (Medical): No     Lack of Transportation (Non-Medical): No   Physical  Activity: Not on file   Stress: Not on file   Social Connections: Not on file   Intimate Partner Violence: Not on file   Housing Stability: Low Risk  (9/12/2024)    Housing Stability Vital Sign     Unable to Pay for Housing in the Last Year: No     Number of Times Moved in the Last Year: 0     Homeless in the Last Year: No     Scheduled Meds:  Continuous Infusions:No current facility-administered medications for this visit.    PRN Meds:.  Allergies   Allergen Reactions    Cephalexin Hives     Skin peeling  Tolerates penicillins (tolerated unasyn and zosyn)    Pollen Extract Sneezing    Metformin GI Intolerance       Physical Examination:    /81   Pulse 88   Ht 6' (1.829 m)   Wt 125 kg (275 lb)   BMI 37.30 kg/m²     Gen: A&Ox3, NAD    Right Upper Extremity:  Splint and dressing clean and dry, removed  Incision healing well without signs of infection  Sutures intact, removed  Improving swelling and ecchymosis around dorsal incision site  Improving tenderness over the dorsal and volar wrist  There is some swelling at the proximal aspect of the incision at the dorsal forearm which may be a seroma or hematoma.  Is tender to palpation.  Sensation intact to light touch in the axillary median, ulnar, and radial nerve distributions  Limited finger and wrist ROM due to pain/stiffness, improved since last visit  Approximate 25% composite fist  Warm, well-perfused digits      Studies:  10/3/24: One culture sample growing Staph epidermidis in broth only -susceptible to the majority of antibiotics except Bactrim and penicillin.  Anaerobic Culture No anaerobes isolated      Growth in Broth culture only Staphylococcus epidermidis Abnormal            Susceptibility     Staphylococcus epidermidis     LEDA (Preliminary)     Cefazolin ($) <=8.00 ug/ml Susceptible     Clindamycin ($) <=0.25 ug/ml Susceptible     Erythromycin ($$$$) <=0.25 ug/ml Susceptible     Gentamicin ($$) <=4 ug/ml Susceptible     Oxacillin <=0.25 ug/ml  Susceptible     Penicillin 0.500 ug/ml Resistant     Tetracycline <=4 ug/ml Susceptible     Trimethoprim + Sulfamethoxazole ($$$) >2/38 ug/ml Resistant     Vancomycin ($) 1.00 ug/ml Susceptible                         Assessment and Plan:  1. Aftercare following surgery of the musculoskeletal system  Ambulatory Referral to PT/OT Hand Therapy      2. Tenosynovitis of finger and hand  Ambulatory Referral to PT/OT Hand Therapy      3. Animal bite of left hand with infection, subsequent encounter  Ambulatory Referral to PT/OT Hand Therapy            59 y.o. male presents 14 days status post Irrigation and debridement, right wrist, volar and dorsal, possible extensor and flexor tenosynovectomy, any indicated procedures - Right. Sutures removed in the office today. He should continue with daily dressing changes and complete his entire course of oral antibiotics as prescribed.  Can continue with soaks until his wound fully closes.  Referral placed to hand therapy for ROM exercises once the incision is fully healed.  In the meantime he is encouraged to perform active finger range of motion and wrist range of motion as tolerated.  It is recommended he return to the office in 2 weeks for repeat evaluation.     he expressed understanding of the plan and agreed. We encouraged them to contact our office with any questions or concerns.         Carroll Mccarthy MD  Hand and Upper Extremity Surgery          *This note was dictated using Dragon voice recognition software. Please excuse any word substitutions or errors.*      Scribe Attestation      I,:  Yesenia Carrasquillo PA-C am acting as a scribe while in the presence of the attending physician.:       I,:  Carroll Mccarthy MD personally performed the services described in this documentation    as scribed in my presence.:

## 2024-10-18 ENCOUNTER — PATIENT OUTREACH (OUTPATIENT)
Dept: CASE MANAGEMENT | Facility: OTHER | Age: 59
End: 2024-10-18

## 2024-10-19 DIAGNOSIS — M79.605 LEFT LEG PAIN: ICD-10-CM

## 2024-10-21 LAB
FUNGUS SPEC CULT: NORMAL

## 2024-10-21 RX ORDER — TRAMADOL HYDROCHLORIDE 50 MG/1
100 TABLET ORAL
Qty: 60 TABLET | Refills: 0 | Status: SHIPPED | OUTPATIENT
Start: 2024-10-21

## 2024-10-22 LAB
MYCOBACTERIUM SPEC CULT: NORMAL
RHODAMINE-AURAMINE STN SPEC: NORMAL

## 2024-10-23 NOTE — ASSESSMENT & PLAN NOTE
Emi applied via [facility] for accurate I/O- provider aware     Esmer Sapp RN  10/23/24 4910     · VIVIANA on CKD 3 secondary to acute infection  · Continues to improve with IV fluids    Continue for additional day    Results from last 7 days   Lab Units 06/02/22  0437 06/01/22  0451 05/31/22  1045   BUN mg/dL 44* 54* 49*   CREATININE mg/dL 1 46* 1 99* 2 29*   EGFR ml/min/1 73sq m 51 35 30

## 2024-10-24 ENCOUNTER — PATIENT OUTREACH (OUTPATIENT)
Dept: CASE MANAGEMENT | Facility: OTHER | Age: 59
End: 2024-10-24

## 2024-10-24 NOTE — LETTER
Date: 10/24/24    Dear Yoni Castillo,   My name is Heike Aditi; I am a registered nurse care manager working with [employer] [address] [address] [phone].   I have not been able to reach you and would like to set a time that I can talk with you over the phone.  My work is to help patients that have complex medical conditions get the care they need. This includes patients who may have been in the hospital or emergency room.    Please call me with any questions you may have. I look forward to speaking with you.  Sincerely,    Heike Pennington  [phone]  Outpatient Care Manager

## 2024-10-25 ENCOUNTER — PATIENT OUTREACH (OUTPATIENT)
Dept: CASE MANAGEMENT | Facility: OTHER | Age: 59
End: 2024-10-25

## 2024-10-25 NOTE — PROGRESS NOTES
Ed returned my phone call. Reports his hand has stopped bleeding and oozing two days ago. Will start OT next week. Feels his range of motion really need help. Blood sugars are up a little bit highest has been 160.   Would like further outreaches. I will check back in two weeks

## 2024-10-28 LAB
FUNGUS SPEC CULT: NORMAL

## 2024-10-30 ENCOUNTER — OFFICE VISIT (OUTPATIENT)
Dept: ENDOCRINOLOGY | Facility: CLINIC | Age: 59
End: 2024-10-30
Payer: MEDICARE

## 2024-10-30 VITALS
WEIGHT: 265 LBS | HEART RATE: 85 BPM | BODY MASS INDEX: 35.89 KG/M2 | HEIGHT: 72 IN | OXYGEN SATURATION: 95 % | DIASTOLIC BLOOD PRESSURE: 82 MMHG | SYSTOLIC BLOOD PRESSURE: 124 MMHG

## 2024-10-30 DIAGNOSIS — E11.65 TYPE 2 DIABETES MELLITUS WITH HYPERGLYCEMIA, WITHOUT LONG-TERM CURRENT USE OF INSULIN (HCC): ICD-10-CM

## 2024-10-30 DIAGNOSIS — I10 ESSENTIAL HYPERTENSION: ICD-10-CM

## 2024-10-30 DIAGNOSIS — E66.01 CLASS 2 SEVERE OBESITY DUE TO EXCESS CALORIES WITH SERIOUS COMORBIDITY AND BODY MASS INDEX (BMI) OF 35.0 TO 35.9 IN ADULT (HCC): ICD-10-CM

## 2024-10-30 DIAGNOSIS — E66.812 CLASS 2 SEVERE OBESITY DUE TO EXCESS CALORIES WITH SERIOUS COMORBIDITY AND BODY MASS INDEX (BMI) OF 35.0 TO 35.9 IN ADULT (HCC): ICD-10-CM

## 2024-10-30 DIAGNOSIS — E11.22 TYPE 2 DIABETES MELLITUS WITH CHRONIC KIDNEY DISEASE, WITHOUT LONG-TERM CURRENT USE OF INSULIN, UNSPECIFIED CKD STAGE (HCC): Primary | ICD-10-CM

## 2024-10-30 LAB — SL AMB POCT HEMOGLOBIN AIC: 6.4 (ref ?–6.5)

## 2024-10-30 PROCEDURE — 83036 HEMOGLOBIN GLYCOSYLATED A1C: CPT | Performed by: INTERNAL MEDICINE

## 2024-10-30 PROCEDURE — 99214 OFFICE O/P EST MOD 30 MIN: CPT | Performed by: INTERNAL MEDICINE

## 2024-10-30 NOTE — PROGRESS NOTES
Yoni Zazuetap 59 y.o. male MRN: [id number]    Encounter: [id number]      Assessment & Plan   Problem List Items Addressed This Visit          Cardiovascular and Mediastinum    Essential hypertension     Blood pressure at goal-continue current regimen         Relevant Orders    Comprehensive metabolic panel       Endocrine    Type 2 diabetes mellitus with chronic kidney disease, without long-term current use of insulin ([facility]) - Primary       Lab Results   Component Value Date    HGBA1C 6.4 10/30/2024   Diabetes is well-controlled-continue current regimen and watch diet         Relevant Orders    POCT hemoglobin A1c (Completed)    Hemoglobin A1C    Albumin / creatinine urine ratio    Comprehensive metabolic panel    Lipid Panel with Direct LDL reflex       Other    Class 2 severe obesity with serious comorbidity and body mass index (BMI) of 35.0 to 35.9 in adult ([facility])     Focus on dietary and lifestyle modifications and weight loss          Other Visit Diagnoses       Type 2 diabetes mellitus with hyperglycemia, without long-term current use of insulin ([facility])        Relevant Orders    Hemoglobin A1C        Deficiency      CC: Diabetes    History of Present Illness     HPI:    59 y.o. male with  type 2 diabetes, hypertension, dyslipidemia, CKD seen in f/u     Dog bite in sept and was hospitalized for management and underwent I&D      Current regimen:   Trulicity  0.75 mg weekly   Jardiance 25 mg daily    Checking f.s in the morning - 4-5 hours after eating 106-130s   No polyuria , polydpsia , no blurry vision ,+ numbness and tingling in feet   No GI SE  No weight loss        Review of Systems    Historical Information   Past Medical History:   Diagnosis Date    Chronic kidney disease 2012    Chronic pain disorder     Diabetes mellitus (HCC)     H/O eye surgery     High blood sugar     Hypertension     Kidney stone     Liver disease      Past Surgical History:   Procedure Laterality Date    GANGLION CYST EXCISION Left  3/25/2024    Procedure: EXCISION MUCOID CYST, INDEX FINGER DIP;  Surgeon: Carroll Mccarthy MD;  [facility];  Service: Orthopedics    GANGLION CYST EXCISION Right 5/20/2024    Procedure: EXCISION MUCOID CYST - RIGHT INDEX AND MIDDLE FINGERS;  Surgeon: Carroll Mccarthy MD;  [facility];  Service: Orthopedics    HERNIA REPAIR      INCISION AND DRAINAGE  01/16/2024    KIDNEY SURGERY      KNEE SURGERY  1980    MOUTH SURGERY      VT AMPUTATION METATARSAL W/TOE SINGLE Left 6/1/2022    Procedure: RAY RESECTION FOOT;  Surgeon: Hannah Melgoza DPM;  [facility];  Service: Podiatry    VT INCISION & DRAINAGE ABSCESS COMPLICATED/MULTIPLE Right 9/17/2024    Procedure: Irrigation and debridement right wrist and hand, any indicated procedures;  Surgeon: Carroll Mccarthy MD;  [facility];  Service: Orthopedics    VT RPR AA HERNIA 1ST < 3 CM REDUCIBLE N/A 7/14/2023    Procedure: REPAIR HERNIA INCISIONAL;  Surgeon: Matt Melo MD;  [facility];  Service: General    VT SYNOVECTOMY EXTENSOR TENDON SHTH WRIST 1 CMPRT Right 10/3/2024    Procedure: Irrigation and debridement, right wrist, volar and dorsal, possible extensor and flexor tenosynovectomy, any indicated procedures;  Surgeon: Carroll Mccarthy MD;  [facility];  Service: Orthopedics    WOUND DEBRIDEMENT Left 4/28/2022    Procedure: Left foot washout;  Surgeon: Hannah Melgoza DPM;  [facility];  Service: Podiatry    WOUND DEBRIDEMENT Left 6/6/2022    Procedure: DEBRIDEMENT WOUND (WASH OUT);  Surgeon: Hannah Melgoza DPM;  [facility];  Service: Podiatry     Social History   Social History     Substance and Sexual Activity   Alcohol Use Yes    Alcohol/week: 1.0 standard drink of alcohol    Types: 1 Cans of beer per week    Comment: ocassional     Social History     Substance and Sexual Activity   Drug Use Never     Social History     Tobacco Use   Smoking Status Never    Smokeless Tobacco Never     Family History:   Family History   Problem Relation Age of Onset    Diabetes Paternal Grandmother     Diabetes Paternal Grandfather        Meds/Allergies   Current Outpatient Medications   Medication Sig Dispense Refill    Accu-Chek FastClix Lancets MISC Use to test blood sugar once a day 102 each 0    acetaminophen (TYLENOL) 500 mg tablet Take 2 tablets (1,000 mg total) by mouth every 6 (six) hours as needed for mild pain (Patient taking differently: Take 2,500 mg by mouth 2 (two) times a day) 60 tablet 0    Acetylcysteine (NAC PO) Take 300 mg by mouth 2 (two) times a day      Alpha-Lipoic Acid 600 MG CAPS Take 600 mg by mouth 2 (two) times a day        ammonium lactate (LAC-HYDRIN) 12 % lotion       Apple Cider Vinegar 300 MG TABS Take 300 mg by mouth 2 (two) times a day      Ascorbic Acid (vitamin C) 1000 MG tablet Take 1,000 mg by mouth 2 (two) times a day 2 tablet twice a day      BENFOTIAMINE PO Take 300 mg by mouth 2 (two) times a day      beta carotene 30 MG capsule Take 30 mg by mouth 2 (two) times a day        Bismuth Tribromoph-Petrolatum (Xeroform Oil Emulsion Strip) MISC Apply topically 3 (three) times a day as needed (As needed for dressing changes) 50 each 0    Blood Glucose Monitoring Suppl (Accu-Chek Guide Me) w/Device KIT Use to check blood sugar once a day 1 kit 0    carvedilol (COREG) 25 mg tablet Take 1 tablet (25 mg total) by mouth 2 (two) times a day with meals 200 tablet 1    chlorhexidine gluconate (HIBICLENS) 4 % external liquid Apply 1 Application topically daily as needed for wound care 118 mL 0    Chromium Picolinate 200 MCG TABS Take 200 mcg by mouth 2 (two) times a day      dulaglutide (Trulicity) 0.75 MG/0.5ML injection Inject 0.5 mL (0.75 mg total) under the skin every 7 days 2 mL 5    Empagliflozin (Jardiance) 25 MG TABS Take 1 tablet (25 mg total) by mouth daily 90 tablet 1    Garlic 1200 MG CAPS Take by mouth 2 (two) times a day       glucose blood  (Accu-Chek Guide) test strip Use to check blood sugar once a day 100 strip 0    IRON, FERROUS SULFATE, PO Take 25 mg by mouth 2 (two) times a day      ketoconazole (NIZORAL) 2 % cream       ketorolac (ACULAR) 0.5 % ophthalmic solution       lidocaine (Lidoderm) 5 % Apply 1 patch topically over 12 hours daily Remove & Discard patch within 12 hours or as directed by MD 30 patch 0    lisinopril-hydrochlorothiazide (PRINZIDE,ZESTORETIC) 20-12.5 MG per tablet Take 1 tablet by mouth 2 (two) times a day 200 tablet 1    Lutein-Bilberry (LUTEIN PLUS BILBERRY PO) Take by mouth 2 (two) times a day      methocarbamol (ROBAXIN) 750 mg tablet Take 2 tablets (1,500 mg total) by mouth every 8 (eight) hours 30 tablet 0    NON FORMULARY Take 150 mg by mouth 2 (two) times a day BLUEBERRY      oxyCODONE (ROXICODONE) 5 immediate release tablet Take 2 tablets (10 mg total) by mouth every 4 (four) hours as needed for severe pain Max Daily Amount: 60 mg 30 tablet 0    Pyridoxine HCl (B-6 PO) Take by mouth 2 (two) times a day      TART CHERRY PO Take 150 mg by mouth 2 (two) times a day      traMADol (Ultram) 50 mg tablet Take 2 tablets (100 mg total) by mouth daily at bedtime as needed for severe pain 2 tablets in evening as needed for severe pain relief 60 tablet 0    Turmeric (QC TUMERIC COMPLEX PO) Take 1,000 mg by mouth 2 (two) times a day Tumeric /curcimin      VITAMIN D PO Take by mouth 2 (two) times a day      vitamin E, tocopherol, 400 units capsule Take 400 Units by mouth 2 (two) times a day      Zinc 50 MG CAPS Take by mouth 2 (two) times a day        No current facility-administered medications for this visit.     Allergies   Allergen Reactions    Cephalexin Hives     Skin peeling  Tolerates penicillins (tolerated unasyn and zosyn)    Pollen Extract Sneezing    Metformin GI Intolerance       Objective   Vitals: Blood pressure 124/82, pulse 85, height 6' (1.829 m), weight 120 kg (265 lb), SpO2 95%.    Physical Exam  Vitals  reviewed.   Constitutional:       General: He is not in acute distress.     Appearance: Normal appearance. He is obese. He is not ill-appearing, toxic-appearing or diaphoretic.   HENT:      Head: Normocephalic and atraumatic.   Eyes:      General: No scleral icterus.     Extraocular Movements: Extraocular movements intact.   Cardiovascular:      Rate and Rhythm: Normal rate and regular rhythm.      Heart sounds: Normal heart sounds. No murmur heard.  Pulmonary:      Effort: Pulmonary effort is normal. No respiratory distress.      Breath sounds: Normal breath sounds. No wheezing or rales.   Abdominal:      General: There is no distension.      Palpations: Abdomen is soft.      Tenderness: There is no abdominal tenderness.   Musculoskeletal:      Cervical back: Neck supple.      Right lower leg: No edema.      Left lower leg: No edema.   Lymphadenopathy:      Cervical: No cervical adenopathy.   Skin:     General: Skin is warm and dry.   Neurological:      General: No focal deficit present.      Mental Status: He is alert and oriented to person, place, and time.      Gait: Gait normal.   Psychiatric:         Mood and Affect: Mood normal.         Behavior: Behavior normal.         Thought Content: Thought content normal.         Judgment: Judgment normal.         The history was obtained from the review of the chart, patient.    Lab Results:   Lab Results   Component Value Date/Time    Hemoglobin A1C 6.4 10/30/2024 01:18 PM    Hemoglobin A1C 8.0 (H) 06/12/2024 02:09 PM    Hemoglobin A1C 7.3 (H) 03/06/2024 04:20 PM    Hemoglobin A1C 6.7 (H) 11/28/2023 08:51 AM    WBC 5.68 09/21/2024 06:01 AM    WBC 4.80 09/20/2024 05:30 AM    WBC 5.65 09/19/2024 05:34 AM    Hemoglobin 9.9 (L) 09/21/2024 06:01 AM    Hemoglobin 10.8 (L) 09/20/2024 05:30 AM    Hemoglobin 11.0 (L) 09/19/2024 05:34 AM    Hgb, i-STAT 10.9 (L) 10/03/2024 06:45 PM    Hematocrit 30.2 (L) 09/21/2024 06:01 AM    Hematocrit 33.1 (L) 09/20/2024 05:30 AM     "Hematocrit 33.5 (L) 09/19/2024 05:34 AM    Hct, i-STAT 32 (L) 10/03/2024 06:45 PM    MCV 95 09/21/2024 06:01 AM    MCV 97 09/20/2024 05:30 AM    MCV 97 09/19/2024 05:34 AM    Platelets 239 09/21/2024 06:01 AM    Platelets 218 09/20/2024 05:30 AM    Platelets 224 09/19/2024 05:34 AM    BUN 23 09/21/2024 06:01 AM    BUN 22 09/20/2024 05:30 AM    BUN 22 09/19/2024 05:34 AM    Potassium 3.8 09/21/2024 06:01 AM    Potassium 3.9 09/20/2024 05:30 AM    Potassium 4.1 09/19/2024 05:34 AM    Chloride 102 09/21/2024 06:01 AM    Chloride 102 09/20/2024 05:30 AM    Chloride 103 09/19/2024 05:34 AM    CO2 28 09/21/2024 06:01 AM    CO2 26 09/20/2024 05:30 AM    CO2 28 09/19/2024 05:34 AM    CO2, i-STAT 27 10/03/2024 06:45 PM    Creatinine 1.47 (H) 09/21/2024 06:01 AM    Creatinine 1.41 (H) 09/20/2024 05:30 AM    Creatinine 1.60 (H) 09/19/2024 05:34 AM    AST 18 09/20/2024 05:30 AM    AST 13 09/12/2024 08:51 AM    AST 12 (L) 09/12/2024 06:15 AM    ALT 17 09/20/2024 05:30 AM    ALT 14 09/12/2024 08:51 AM    ALT 11 09/12/2024 06:15 AM    Total Protein 6.5 09/20/2024 05:30 AM    Total Protein 7.1 09/12/2024 08:51 AM    Total Protein 5.9 (L) 09/12/2024 06:15 AM    Albumin 3.7 09/20/2024 05:30 AM    Albumin 3.9 09/12/2024 08:51 AM    Albumin 3.0 (L) 09/12/2024 06:15 AM    HDL, Direct 34 (L) 06/12/2024 02:09 PM    HDL, Direct 38 (L) 03/06/2024 04:20 PM    Triglycerides 109 06/12/2024 02:09 PM    Triglycerides 144 03/06/2024 04:20 PM           Imaging Studies: Results Review Statement: No pertinent imaging studies reviewed.    Portions of the record may have been created with voice recognition software. Occasional wrong word or \"sound a like\" substitutions may have occurred due to the inherent limitations of voice recognition software. Read the chart carefully and recognize, using context, where substitutions have occurred.    "

## 2024-10-31 ENCOUNTER — OFFICE VISIT (OUTPATIENT)
Dept: OBGYN CLINIC | Facility: MEDICAL CENTER | Age: 59
End: 2024-10-31

## 2024-10-31 VITALS
BODY MASS INDEX: 35.89 KG/M2 | DIASTOLIC BLOOD PRESSURE: 80 MMHG | HEIGHT: 72 IN | WEIGHT: 265 LBS | SYSTOLIC BLOOD PRESSURE: 124 MMHG

## 2024-10-31 DIAGNOSIS — M65.90 TENOSYNOVITIS: ICD-10-CM

## 2024-10-31 DIAGNOSIS — Z47.89 AFTERCARE FOLLOWING SURGERY OF THE MUSCULOSKELETAL SYSTEM: ICD-10-CM

## 2024-10-31 DIAGNOSIS — G89.18 ACUTE POST-OPERATIVE PAIN: ICD-10-CM

## 2024-10-31 PROCEDURE — 99024 POSTOP FOLLOW-UP VISIT: CPT | Performed by: ORTHOPAEDIC SURGERY

## 2024-10-31 RX ORDER — OXYCODONE HYDROCHLORIDE 5 MG/1
5 TABLET ORAL EVERY 6 HOURS PRN
Qty: 30 TABLET | Refills: 0 | Status: SHIPPED | OUTPATIENT
Start: 2024-10-31

## 2024-10-31 NOTE — ASSESSMENT & PLAN NOTE
Lab Results   Component Value Date    HGBA1C 6.4 10/30/2024   Diabetes is well-controlled-continue current regimen and watch diet

## 2024-10-31 NOTE — PROGRESS NOTES
HAND & UPPER EXTREMITY OFFICE VISIT   Referred By:  No referring provider defined for this encounter.      Chief Complaint:     Right wrist pain    Surgery:  Surgery Date: 10/3/2024 - Irrigation and debridement, right wrist, volar and dorsal, possible extensor and flexor tenosynovectomy, any indicated procedures - Right     History of Present Illness:   Patient presents now 28 days status post the above surgery. he reports continuing to improve since last visit.  He feels a lot of stiffness but his pain is getting better.  He is only needing some pain medication at night now.  He did run out of pain medication.  He is asking for refill  He has completed his oral antibiotics 4 days ago.  Denies fevers or chills.  He starts physical therapy tomorrow.    Past Medical History:  Past Medical History:   Diagnosis Date    Chronic kidney disease 2012    Chronic pain disorder     Diabetes mellitus (HCC)     H/O eye surgery     High blood sugar     Hypertension     Kidney stone     Liver disease      Past Surgical History:   Procedure Laterality Date    GANGLION CYST EXCISION Left 3/25/2024    Procedure: EXCISION MUCOID CYST, INDEX FINGER DIP;  Surgeon: Carroll Mccarthy MD;  [facility];  Service: Orthopedics    GANGLION CYST EXCISION Right 5/20/2024    Procedure: EXCISION MUCOID CYST - RIGHT INDEX AND MIDDLE FINGERS;  Surgeon: Carroll Mccarthy MD;  Location:  MAIN OR;  Service: Orthopedics    HERNIA REPAIR      INCISION AND DRAINAGE  01/16/2024    KIDNEY SURGERY      KNEE SURGERY  1980    MOUTH SURGERY      KS AMPUTATION METATARSAL W/TOE SINGLE Left 6/1/2022    Procedure: RAY RESECTION FOOT;  Surgeon: Hannah Melgoza DPM;  Location: [facility];  Service: Podiatry    KS INCISION & DRAINAGE ABSCESS COMPLICATED/MULTIPLE Right 9/17/2024    Procedure: Irrigation and debridement right wrist and hand, any indicated procedures;  Surgeon: Carroll Mccarthy MD;  [facility];  Service: Orthopedics     GA RPR AA HERNIA 1ST < 3 CM REDUCIBLE N/A 7/14/2023    Procedure: REPAIR HERNIA INCISIONAL;  Surgeon: Matt Melo MD;  [facility];  Service: General    GA SYNOVECTOMY EXTENSOR TENDON SHTH WRIST 1 CMPRT Right 10/3/2024    Procedure: Irrigation and debridement, right wrist, volar and dorsal, possible extensor and flexor tenosynovectomy, any indicated procedures;  Surgeon: Carroll Mccarthy MD;  [facility];  Service: Orthopedics    WOUND DEBRIDEMENT Left 4/28/2022    Procedure: Left foot washout;  Surgeon: Hannah Melgoza DPM;  [facility];  Service: Podiatry    WOUND DEBRIDEMENT Left 6/6/2022    Procedure: DEBRIDEMENT WOUND (WASH OUT);  Surgeon: Hannah Melgoza DPM;  [facility];  Service: Podiatry     Family History   Problem Relation Age of Onset    Diabetes Paternal Grandmother     Diabetes Paternal Grandfather      Social History     Socioeconomic History    Marital status: Registered Domestic Partner     Spouse name: Not on file    Number of children: Not on file    Years of education: Not on file    Highest education level: Not on file   Occupational History    Not on file   Tobacco Use    Smoking status: Never    Smokeless tobacco: Never   Vaping Use    Vaping status: Never Used   Substance and Sexual Activity    Alcohol use: Yes     Alcohol/week: 1.0 standard drink of alcohol     Types: 1 Cans of beer per week     Comment: ocassional    Drug use: Never    Sexual activity: Not Currently     Partners: Female     Birth control/protection: Abstinence   Other Topics Concern    Not on file   Social History Narrative    Not on file     Social Determinants of Health     Financial Resource Strain: Not on file   Food Insecurity: No Food Insecurity (9/12/2024)    Nursing - Inadequate Food Risk Classification     Worried About Running Out of Food in the Last Year: Never true     Ran Out of Food in the Last Year: Never true     Ran Out of Food in the Last Year: Not on  file   Transportation Needs: No Transportation Needs (9/12/2024)    PRAPARE - Transportation     Lack of Transportation (Medical): No     Lack of Transportation (Non-Medical): No   Physical Activity: Not on file   Stress: Not on file   Social Connections: Not on file   Intimate Partner Violence: Not on file   Housing Stability: Low Risk  (9/12/2024)    Housing Stability Vital Sign     Unable to Pay for Housing in the Last Year: No     Number of Times Moved in the Last Year: 0     Homeless in the Last Year: No     Scheduled Meds:  Continuous Infusions:No current facility-administered medications for this visit.    PRN Meds:.  Allergies   Allergen Reactions    Cephalexin Hives     Skin peeling  Tolerates penicillins (tolerated unasyn and zosyn)    Pollen Extract Sneezing    Metformin GI Intolerance       Physical Examination:    /80   Ht 6' (1.829 m)   Wt 120 kg (265 lb)   BMI 35.94 kg/m²     Gen: A&Ox3, NAD    Right Upper Extremity:  Dressing removed.  Incision healing well and now almost 100% close aside from a very punctate region at the distal aspect of the incision.  There is granulation tissue in this area.  Improving swelling and ecchymosis around dorsal incision site  Improving tenderness over the dorsal and volar wrist  Sensation intact to light touch in the axillary median, ulnar, and radial nerve distributions  Limited finger and wrist ROM due to pain/stiffness, improved since last visit  Approximate 50% composite fist  Minimal wrist extension, 30 degrees wrist flexion  Warm, well-perfused digits      Studies:  10/3/24: One culture sample growing Staph epidermidis in broth only -susceptible to the majority of antibiotics except Bactrim and penicillin.  Anaerobic Culture No anaerobes isolated      Growth in Broth culture only Staphylococcus epidermidis Abnormal            Susceptibility     Staphylococcus epidermidis     LEDA (Preliminary)     Cefazolin ($) <=8.00 ug/ml Susceptible     Clindamycin  ($) <=0.25 ug/ml Susceptible     Erythromycin ($$$$) <=0.25 ug/ml Susceptible     Gentamicin ($$) <=4 ug/ml Susceptible     Oxacillin <=0.25 ug/ml Susceptible     Penicillin 0.500 ug/ml Resistant     Tetracycline <=4 ug/ml Susceptible     Trimethoprim + Sulfamethoxazole ($$$) >2/38 ug/ml Resistant     Vancomycin ($) 1.00 ug/ml Susceptible                         Assessment and Plan:  1. Tenosynovitis  oxyCODONE (ROXICODONE) 5 immediate release tablet      2. Acute post-operative pain  oxyCODONE (ROXICODONE) 5 immediate release tablet      3. Aftercare following surgery of the musculoskeletal system  oxyCODONE (ROXICODONE) 5 immediate release tablet            59 y.o. male presents 28 days status post Irrigation and debridement, right wrist, volar and dorsal, possible extensor and flexor tenosynovectomy, any indicated procedures - Right.   Things are looking much better and he continues to progress each day. he is encouraged to perform active finger range of motion and wrist range of motion as tolerated and can advance motion with therapy as tolerated.  He can keep the distal aspect of the incision covered until that wound fully closes but then he does not need to have the incision covered.  Based on his clinical course I do not think that he needs any further antibiotics at this time.    Refill of oxycodone prescribed to help with with pain at night.     It is recommended he return to the office in 2 weeks for repeat evaluation.     he expressed understanding of the plan and agreed. We encouraged them to contact our office with any questions or concerns.         Carroll Mccarthy MD  Hand and Upper Extremity Surgery          *This note was dictated using Dragon voice recognition software. Please excuse any word substitutions or errors.*

## 2024-11-01 ENCOUNTER — EVALUATION (OUTPATIENT)
Dept: OCCUPATIONAL THERAPY | Age: 59
End: 2024-11-01
Payer: MEDICARE

## 2024-11-01 ENCOUNTER — OFFICE VISIT (OUTPATIENT)
Dept: PODIATRY | Facility: CLINIC | Age: 59
End: 2024-11-01
Payer: MEDICARE

## 2024-11-01 VITALS
BODY MASS INDEX: 35.89 KG/M2 | DIASTOLIC BLOOD PRESSURE: 80 MMHG | HEIGHT: 72 IN | SYSTOLIC BLOOD PRESSURE: 140 MMHG | WEIGHT: 265 LBS

## 2024-11-01 DIAGNOSIS — L08.9 ANIMAL BITE OF LEFT HAND WITH INFECTION, SUBSEQUENT ENCOUNTER: ICD-10-CM

## 2024-11-01 DIAGNOSIS — Z47.89 AFTERCARE FOLLOWING SURGERY OF THE MUSCULOSKELETAL SYSTEM: ICD-10-CM

## 2024-11-01 DIAGNOSIS — S61.452D ANIMAL BITE OF LEFT HAND WITH INFECTION, SUBSEQUENT ENCOUNTER: ICD-10-CM

## 2024-11-01 DIAGNOSIS — E11.9 ENCOUNTER FOR DIABETIC FOOT EXAM (HCC): Primary | ICD-10-CM

## 2024-11-01 DIAGNOSIS — M65.949 TENOSYNOVITIS OF FINGER AND HAND: Primary | ICD-10-CM

## 2024-11-01 PROCEDURE — 97110 THERAPEUTIC EXERCISES: CPT

## 2024-11-01 PROCEDURE — 97140 MANUAL THERAPY 1/> REGIONS: CPT

## 2024-11-01 PROCEDURE — 97166 OT EVAL MOD COMPLEX 45 MIN: CPT

## 2024-11-01 PROCEDURE — 99203 OFFICE O/P NEW LOW 30 MIN: CPT

## 2024-11-01 NOTE — PROGRESS NOTES
Ambulatory Visit  Name: Yoni Castillo      : 1965      MRN: [id number]  Encounter Provider: Gurmeet Peraza DPM  Encounter Date: 2024   Encounter department: [facility] [place]    Assessment & Plan  Encounter for diabetic foot exam (HCC)         Diagnosis and options discussed with patient  Patient agreeable to the plan as stated below    -DM foot risk is high. Recommend follow up every 3 months  -Discussed DM risk to lower extremities, proper shoe gear, and daily monitoring of feet.   -Discussed weight loss and suitable exercise regiment.   -Reviewed most recent PCP visit on 3/12/2024  -Educated on A1C and the risks of poorly controlled Diabetes. Reviewed recent A1C:  Lab Results   Component Value Date    HGBA1C 6.4 10/30/2024    HGBA1C 8.0 (H) 2024   .     History of Present Illness     Yoni Castillo is a 59 y.o. male who presents today for diabetic foot evaluation.  The patient has diabetes for several years.  The blood glucose is under control.  The patient has history of left 5th ray resection resection from a diabetic foot infection.  reports numbness or paresthesia in bilateral.  Denied weakness or significant functional deficit.  The patient presents with no concerns today.    History obtained from : patient    Review of Systems  Current Outpatient Medications on File Prior to Visit   Medication Sig Dispense Refill    Accu-Chek FastClix Lancets MISC Use to test blood sugar once a day 102 each 0    acetaminophen (TYLENOL) 500 mg tablet Take 2 tablets (1,000 mg total) by mouth every 6 (six) hours as needed for mild pain (Patient taking differently: Take 2,500 mg by mouth 2 (two) times a day) 60 tablet 0    Acetylcysteine (NAC PO) Take 300 mg by mouth 2 (two) times a day      Alpha-Lipoic Acid 600 MG CAPS Take 600 mg by mouth 2 (two) times a day        ammonium lactate (LAC-HYDRIN) 12 % lotion       Apple Cider Vinegar 300 MG TABS Take 300 mg by mouth 2 (two) times a day       Ascorbic Acid (vitamin C) 1000 MG tablet Take 1,000 mg by mouth 2 (two) times a day 2 tablet twice a day      BENFOTIAMINE PO Take 300 mg by mouth 2 (two) times a day      beta carotene 30 MG capsule Take 30 mg by mouth 2 (two) times a day        Bismuth Tribromoph-Petrolatum (Xeroform Oil Emulsion Strip) MISC Apply topically 3 (three) times a day as needed (As needed for dressing changes) 50 each 0    Blood Glucose Monitoring Suppl (Accu-Chek Guide Me) w/Device KIT Use to check blood sugar once a day 1 kit 0    carvedilol (COREG) 25 mg tablet Take 1 tablet (25 mg total) by mouth 2 (two) times a day with meals 200 tablet 1    chlorhexidine gluconate (HIBICLENS) 4 % external liquid Apply 1 Application topically daily as needed for wound care 118 mL 0    Chromium Picolinate 200 MCG TABS Take 200 mcg by mouth 2 (two) times a day      dulaglutide (Trulicity) 0.75 MG/0.5ML injection Inject 0.5 mL (0.75 mg total) under the skin every 7 days 2 mL 5    Empagliflozin (Jardiance) 25 MG TABS Take 1 tablet (25 mg total) by mouth daily 90 tablet 1    Garlic 1200 MG CAPS Take by mouth 2 (two) times a day       glucose blood (Accu-Chek Guide) test strip Use to check blood sugar once a day 100 strip 0    IRON, FERROUS SULFATE, PO Take 25 mg by mouth 2 (two) times a day      ketoconazole (NIZORAL) 2 % cream       ketorolac (ACULAR) 0.5 % ophthalmic solution       lidocaine (Lidoderm) 5 % Apply 1 patch topically over 12 hours daily Remove & Discard patch within 12 hours or as directed by MD 30 patch 0    lisinopril-hydrochlorothiazide (PRINZIDE,ZESTORETIC) 20-12.5 MG per tablet Take 1 tablet by mouth 2 (two) times a day 200 tablet 1    Lutein-Bilberry (LUTEIN PLUS BILBERRY PO) Take by mouth 2 (two) times a day      methocarbamol (ROBAXIN) 750 mg tablet Take 2 tablets (1,500 mg total) by mouth every 8 (eight) hours 30 tablet 0    NON FORMULARY Take 150 mg by mouth 2 (two) times a day BLUEBERRY      oxyCODONE (ROXICODONE) 5 immediate  release tablet Take 1 tablet (5 mg total) by mouth every 6 (six) hours as needed for severe pain Max Daily Amount: 20 mg 30 tablet 0    Pyridoxine HCl (B-6 PO) Take by mouth 2 (two) times a day      TART CHERRY PO Take 150 mg by mouth 2 (two) times a day      traMADol (Ultram) 50 mg tablet Take 2 tablets (100 mg total) by mouth daily at bedtime as needed for severe pain 2 tablets in evening as needed for severe pain relief 60 tablet 0    Turmeric (QC TUMERIC COMPLEX PO) Take 1,000 mg by mouth 2 (two) times a day Tumeric /curcimin      VITAMIN D PO Take by mouth 2 (two) times a day      vitamin E, tocopherol, 400 units capsule Take 400 Units by mouth 2 (two) times a day      Zinc 50 MG CAPS Take by mouth 2 (two) times a day        No current facility-administered medications on file prior to visit.          Objective     There were no vitals taken for this visit.    Physical Exam  Cardiovascular:      Pulses:           Dorsalis pedis pulses are 1+ on the right side and 1+ on the left side.        Posterior tibial pulses are 1+ on the right side and 1+ on the left side.   Musculoskeletal:      Right foot: Decreased range of motion. Deformity present.      Left foot: Decreased range of motion. Deformity present.        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   Feet:      Right foot:      Protective Sensation: 10 sites tested.  6 sites sensed.      Skin integrity: Dry skin present. No ulcer or skin breakdown.      Left foot:      Protective Sensation: 10 sites tested.  6 sites sensed.      Skin integrity: Dry skin present. No ulcer or skin breakdown.

## 2024-11-03 DIAGNOSIS — M51.369 DDD (DEGENERATIVE DISC DISEASE), LUMBAR: ICD-10-CM

## 2024-11-04 LAB
FUNGUS SPEC CULT: NORMAL

## 2024-11-04 RX ORDER — METHOCARBAMOL 750 MG/1
1500 TABLET, FILM COATED ORAL EVERY 8 HOURS SCHEDULED
Qty: 30 TABLET | Refills: 0 | Status: SHIPPED | OUTPATIENT
Start: 2024-11-04

## 2024-11-05 LAB
MYCOBACTERIUM SPEC CULT: NORMAL
RHODAMINE-AURAMINE STN SPEC: NORMAL

## 2024-11-06 ENCOUNTER — TELEPHONE (OUTPATIENT)
Dept: NEPHROLOGY | Facility: CLINIC | Age: 59
End: 2024-11-06

## 2024-11-06 NOTE — TELEPHONE ENCOUNTER
Called patient and left a voicemail regarding a follow up with Dr. Marinelli from [facility].   I scheduled in [place] Thursday 8/21/2025 at 2:30  Pm  mailing an appointment card.

## 2024-11-08 ENCOUNTER — PATIENT OUTREACH (OUTPATIENT)
Dept: CASE MANAGEMENT | Facility: OTHER | Age: 59
End: 2024-11-08

## 2024-11-08 NOTE — PROGRESS NOTES
Spoke with Ed he continues to work with OT. Feels hand is improving but slowly. Does have compression glove to wear.   FBS less than 115.   He continues to work with his medical team.  Will close from care management at this time,

## 2024-11-11 ENCOUNTER — PATIENT OUTREACH (OUTPATIENT)
Dept: CASE MANAGEMENT | Facility: OTHER | Age: 59
End: 2024-11-11

## 2024-11-11 ENCOUNTER — OFFICE VISIT (OUTPATIENT)
Dept: OCCUPATIONAL THERAPY | Age: 59
End: 2024-11-11
Payer: MEDICARE

## 2024-11-11 ENCOUNTER — TELEPHONE (OUTPATIENT)
Age: 59
End: 2024-11-11

## 2024-11-11 DIAGNOSIS — Z47.89 AFTERCARE FOLLOWING SURGERY OF THE MUSCULOSKELETAL SYSTEM: ICD-10-CM

## 2024-11-11 DIAGNOSIS — M65.949 TENOSYNOVITIS OF FINGER AND HAND: Primary | ICD-10-CM

## 2024-11-11 DIAGNOSIS — L08.9 ANIMAL BITE OF LEFT HAND WITH INFECTION, SUBSEQUENT ENCOUNTER: ICD-10-CM

## 2024-11-11 DIAGNOSIS — S61.452D ANIMAL BITE OF LEFT HAND WITH INFECTION, SUBSEQUENT ENCOUNTER: ICD-10-CM
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
## 2024-11-11 LAB
MYCOBACTERIUM SPEC CULT: NORMAL
RHODAMINE-AURAMINE STN SPEC: NORMAL

## 2024-11-11 PROCEDURE — 97140 MANUAL THERAPY 1/> REGIONS: CPT

## 2024-11-11 PROCEDURE — 97110 THERAPEUTIC EXERCISES: CPT

## 2024-11-11 PROCEDURE — 97530 THERAPEUTIC ACTIVITIES: CPT

## 2024-11-11 RX ORDER — MUPIROCIN 20 MG/G
OINTMENT TOPICAL 3 TIMES DAILY
Qty: 15 G | Refills: 1 | Status: SHIPPED | OUTPATIENT
Start: 2024-11-11

## 2024-11-11 NOTE — PROGRESS NOTES
Ed called asking if I could assist him with getting Augmentin and Mupirocin cream from the doctor.   Ed's dog nipped back of left hand on Saturday he has been cleaning the area with chlorhexidene . One puncture area still leaking.  Ed had [facility] appointment on Wednesday 11/13/24  I will send message.

## 2024-11-11 NOTE — TELEPHONE ENCOUNTER
Notified patient that two prescriptions were called in today. Patient was calling back but did not know why he was called and did not listened to his message.   I did not find any encounter.  Thank you!!

## 2024-11-11 NOTE — PROGRESS NOTES
I called and left a voicemail for Ed letting him know both prescriptions were sent to [facility] on [facility] [address] [facility] [address] [facility]

## 2024-11-11 NOTE — PROGRESS NOTES
Please let the patient know that prescription for Augmentin and mupirocin ointment has been sent to [facility] on [address] [facility] [address] in [place]

## 2024-11-11 NOTE — PROGRESS NOTES
"Daily Note     Today's date: 2024  Patient name: Yoni Castillo  : 1965  MRN: [id number]  Referring provider: Yesenia Carrasquillo PA-C  Dx:   Encounter Diagnosis     ICD-10-CM    1. Tenosynovitis of finger and hand  M65.949       2. Animal bite of left hand with infection, subsequent encounter  S61.452D     L08.9       3. Aftercare following surgery of the musculoskeletal system  Z47.89           Start Time: 1430  Stop Time: 1528  Total time in clinic (min): 58 minutes    Subjective: \"I have been doing the exercises and stretches at home. The dog got the left hand now.\"      Objective: See treatment diary below      Assessment: Tolerated treatment well. Pt has a new injury on the L hand - dog bite. Reports it is not severe. Pt with some wound closure/healing today compared to last seen. He continues with significant wrist and digit stiffness as well as thick scarring on the dorsum of his hand/wrist. Reviewed stretching techniques. Focused on manual therapy for scar mobilization, PROM to wrist and digits, and composite fist for grasping activities. Patient would benefit from continued OT      Plan: Continue per plan of care.      Precautions: right hand and wrist s/p I&D 10/3   Manuals   eval            Edema mob    5         Scar tissue mob  10'       STM/IASTM                                         Ther Ex                  Scap squeeze              Shoulder ROM          Elbow ROM         Forearm AROM/PROM         Wrist AROM/PROM  20'       Digit AROM/PROM  8'      Thumb AROM/PROM        Digit abduction        Intrinsic stretch                Graded strength                                    Ther Activity              Grasp/release    Dowel squeeze into pink putty with digits wrapped in flexion         Dexterity           Mari's Carry PRN           Small tool use/small part activity                                               Neuro Re-Ed                                           Ortho Fit     "         Static progressive splinting for wrist/finger flexion/extension                              Modalities             HP  5'  5'

## 2024-11-12 NOTE — TELEPHONE ENCOUNTER
Called Ed earlier and left him a voicemail letting him know his prescriptions were sent to his pharmacy. Yoni relayed the message to Ed and nothing further is needed.

## 2024-11-13 ENCOUNTER — OFFICE VISIT (OUTPATIENT)
Age: 59
End: 2024-11-13
Payer: MEDICARE

## 2024-11-13 VITALS
DIASTOLIC BLOOD PRESSURE: 70 MMHG | BODY MASS INDEX: 37.9 KG/M2 | HEIGHT: 72 IN | HEART RATE: 78 BPM | OXYGEN SATURATION: 97 % | TEMPERATURE: 98.1 F | WEIGHT: 279.8 LBS | SYSTOLIC BLOOD PRESSURE: 122 MMHG

## 2024-11-13 DIAGNOSIS — E11.69 TYPE 2 DIABETES MELLITUS WITH OTHER SPECIFIED COMPLICATION, WITHOUT LONG-TERM CURRENT USE OF INSULIN (HCC): ICD-10-CM

## 2024-11-13 DIAGNOSIS — Z12.5 PROSTATE CANCER SCREENING: ICD-10-CM

## 2024-11-13 DIAGNOSIS — D64.9 ANEMIA, UNSPECIFIED TYPE: ICD-10-CM

## 2024-11-13 DIAGNOSIS — E66.01 CLASS 2 SEVERE OBESITY DUE TO EXCESS CALORIES WITH SERIOUS COMORBIDITY AND BODY MASS INDEX (BMI) OF 35.0 TO 35.9 IN ADULT (HCC): ICD-10-CM

## 2024-11-13 DIAGNOSIS — E11.22 TYPE 2 DIABETES MELLITUS WITH CHRONIC KIDNEY DISEASE, WITHOUT LONG-TERM CURRENT USE OF INSULIN, UNSPECIFIED CKD STAGE (HCC): ICD-10-CM

## 2024-11-13 DIAGNOSIS — N18.31 STAGE 3A CHRONIC KIDNEY DISEASE (HCC): ICD-10-CM
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
## 2024-11-13 DIAGNOSIS — E66.812 CLASS 2 SEVERE OBESITY DUE TO EXCESS CALORIES WITH SERIOUS COMORBIDITY AND BODY MASS INDEX (BMI) OF 35.0 TO 35.9 IN ADULT (HCC): ICD-10-CM

## 2024-11-13 DIAGNOSIS — I10 ESSENTIAL HYPERTENSION: ICD-10-CM

## 2024-11-13 PROBLEM — Z01.818 PREOP EXAM FOR INTERNAL MEDICINE: Status: RESOLVED | Noted: 2024-08-01 | Resolved: 2024-11-13

## 2024-11-13 PROBLEM — E66.9 OBESITY (BMI 30-39.9): Status: RESOLVED | Noted: 2024-09-18 | Resolved: 2024-11-13

## 2024-11-13 PROBLEM — L03.113 CELLULITIS OF HAND, RIGHT: Status: RESOLVED | Noted: 2024-01-15 | Resolved: 2024-11-13

## 2024-11-13 PROCEDURE — 99214 OFFICE O/P EST MOD 30 MIN: CPT | Performed by: INTERNAL MEDICINE

## 2024-11-13 PROCEDURE — G0438 PPPS, INITIAL VISIT: HCPCS | Performed by: INTERNAL MEDICINE

## 2024-11-13 NOTE — ASSESSMENT & PLAN NOTE
Patient is advised to work on reduction of body weight through combination of walking exercise and decrease calorie consumption recommend 25% reduction of all calories consumed on a daily basis

## 2024-11-13 NOTE — ASSESSMENT & PLAN NOTE
Lab Results   Component Value Date    EGFR 50 09/21/2024    EGFR 53 09/20/2024    EGFR 45 09/19/2024    CREATININE 1.47 (H) 09/21/2024    CREATININE 1.41 (H) 09/20/2024    CREATININE 1.60 (H) 09/19/2024   Based on most recent blood work the patient remains in stage III chronic kidney disease with a GFR of 50 cc/min updated laboratory testing has been requested including comprehensive metabolic profile.  He should continue current diabetic management and hypertensive management should avoid nonsteroidal anti-inflammatories.

## 2024-11-13 NOTE — ASSESSMENT & PLAN NOTE
New dog bite to the left hand reviewed today patient should continue his prescribed Augmentin for period of 10 days local care to the wounds applied today they are multiple small penetrating wounds.  No evidence of purulence nor indication of increased inflammation of the tissue around the dog bite marks.  He knows to contact us or go to the emergency room if he develops fever or chills or increasing inflammation in the area of the dog bite

## 2024-11-13 NOTE — PROGRESS NOTES
Ambulatory Visit  Name: Yoni Castillo      : 1965      MRN: [id number]  Encounter Provider: Judd Ji MD  Encounter Date: 2024   Encounter department: [facility] INTERNAL MEDICINE    Assessment & Plan  Essential hypertension  Pressure assessment today confirms good control of hypertension blood pressure assessment today confirms good control of hypertension I have asked the patient to continue on his lisinopril hydrochlorothiazide 20-12.5 mg 1 tablet twice a day and also continue his carvedilol 25 mg twice a day I have requested an updated comprehensive metabolic profile to evaluate kidney performance and electrolyte balance    Orders:    Comprehensive metabolic panel; Future    UA w Reflex to Microscopic w Reflex to Culture; Future    Type 2 diabetes mellitus with other specified complication, without long-term current use of insulin (HCC)    Lab Results   Component Value Date    HGBA1C 6.4 10/30/2024       Orders:    Comprehensive metabolic panel; Future    UA w Reflex to Microscopic w Reflex to Culture; Future    Prostate cancer screening    Orders:    PSA, Total Screen; Future    Anemia, unspecified type    Orders:    CBC and differential; Future    Type 2 diabetes mellitus with chronic kidney disease, without long-term current use of insulin, unspecified CKD stage (HCC)  Less recent hemoglobin A1c is in good range with a reading of 6.4%.  I have requested the patient continue his current diabetic management and follow-up with endocrinology.  Updated comprehensive metabolic profile for fasting blood sugar assessment has been requested.  Lab Results   Component Value Date    HGBA1C 6.4 10/30/2024            Stage 3a chronic kidney disease (HCC)  Lab Results   Component Value Date    EGFR 50 2024    EGFR 53 2024    EGFR 45 2024    CREATININE 1.47 (H) 2024    CREATININE 1.41 (H) 2024    CREATININE 1.60 (H) 2024   Based on most recent blood  work the patient remains in stage III chronic kidney disease with a GFR of 50 cc/min updated laboratory testing has been requested including comprehensive metabolic profile.  He should continue current diabetic management and hypertensive management should avoid nonsteroidal anti-inflammatories.         Dog bite, subsequent encounter  New dog bite to the left hand reviewed today patient should continue his prescribed Augmentin for period of 10 days local care to the wounds applied today they are multiple small penetrating wounds.  No evidence of purulence nor indication of increased inflammation of the tissue around the dog bite marks.  He knows to contact us or go to the emergency room if he develops fever or chills or increasing inflammation in the area of the dog bite         Class 2 severe obesity due to excess calories with serious comorbidity and body mass index (BMI) of 35.0 to 35.9 in adult (HCC)  Patient is advised to work on reduction of body weight through combination of walking exercise and decrease calorie consumption recommend 25% reduction of all calories consumed on a daily basis             Preventive health issues were discussed with patient, and age appropriate screening tests were ordered as noted in patient's After Visit Summary. Personalized health advice and appropriate referrals for health education or preventive services given if needed, as noted in patient's After Visit Summary.    History of Present Illness     This 59-year-old gentleman returns to our office today for an annual physical examination and review of most recent blood work.  He called our office several days ago indicating that his dog had bit his left hand.  He has had several previous dog bites most recently prior to the current dog bite involving his right hand which required surgical drainage and decompression.  Surgical incision marks are healed well patient does have some persistent stiffness in the wrist and hand on the  right side.  The dog bites on the left hand indicate tooth penetration into the soft tissue with small areas of penetration but no significant area of erythema or warmth emanating from the hand.  Serous drainage from 3 of the wounds is noted.  He is currently taking Augmentin we prescribed for management of the dog bite.  Local care to the dog bite on his left hand was provided to the patient during today's visit.    Patient is up-to-date with foot care having recently seen his podiatrist for management of his diabetic condition.    Patient has had no recent cardiac symptoms of chest pain palpitations or shortness of breath.       Patient Care Team:  Judd Ji MD as PCP - General (Internal Medicine)  Marissa Meyer MD as PCP - Endocrinology (Endocrinology)  Rati JAN Hope as Nurse Practitioner (Endocrinology)  Richard Marinelli MD (Nephrology)    Review of Systems   Skin:         Penetrating dog bite wounds left hand   All other systems reviewed and are negative.    Medical History Reviewed by provider this encounter:  Meds  Problems       Annual Wellness Visit Questionnaire   Yoni is here for his Subsequent Wellness visit. Last Medicare Wellness visit information reviewed, patient interviewed, no change since last AWV.     Health Risk Assessment:   Patient rates overall health as good. Patient feels that their physical health rating is same. Patient is satisfied with their life. Eyesight was rated as same. Hearing was rated as same. Patient feels that their emotional and mental health rating is same. Patients states they are sometimes angry. Patient states they are never, rarely unusually tired/fatigued. Pain experienced in the last 7 days has been some. Patient's pain rating has been 7/10. Patient states that he has experienced no weight loss or gain in last 6 months.     Depression Screening:   PHQ-2 Score: 0      Fall Risk Screening:   In the past year, patient has experienced: no  history of falling in past year      Home Safety:  Patient does not have trouble with stairs inside or outside of their home. Patient has working smoke alarms and has working carbon monoxide detector. Home safety hazards include: none.     Medications:   Patient is currently taking over-the-counter supplements. OTC medications include: see medication list. Patient is able to manage medications.     PREVENTIVE SCREENINGS      Cardiovascular Screening:    General: Screening Current      Diabetes Screening:     General: Screening Not Indicated and History Diabetes      Lung Cancer Screening:     General: Screening Not Indicated    Social Drivers of Health     Food Insecurity: Patient Declined (11/13/2024)    Hunger Vital Sign     Worried About Running Out of Food in the Last Year: Patient declined     Ran Out of Food in the Last Year: Patient declined   Transportation Needs: Patient Declined (11/13/2024)    PRAPARE - Transportation     Lack of Transportation (Medical): Patient declined     Lack of Transportation (Non-Medical): Patient declined   Housing Stability: Patient Declined (11/13/2024)    Housing Stability Vital Sign     Unable to Pay for Housing in the Last Year: Patient declined     Number of Times Moved in the Last Year: 0     Homeless in the Last Year: Patient declined   Utilities: Patient Declined (11/13/2024)    [facility] Utilities     Threatened with loss of utilities: Patient declined     No results found.    Objective     /70   Pulse 78   Temp 98.1 °F (36.7 °C) (Tympanic)   Ht 6' (1.829 m)   Wt 127 kg (279 lb 12.8 oz)   SpO2 97%   BMI 37.95 kg/m²     Physical Exam  Vitals and nursing note reviewed.   Constitutional:       General: He is not in acute distress.     Appearance: He is well-developed.   HENT:      Head: Normocephalic and atraumatic.      Right Ear: Tympanic membrane, ear canal and external ear normal.      Left Ear: Tympanic membrane, ear canal and external ear normal.      Nose: Nose  normal.      Mouth/Throat:      Mouth: Mucous membranes are moist.      Pharynx: Oropharynx is clear.   Eyes:      Extraocular Movements: Extraocular movements intact.      Conjunctiva/sclera: Conjunctivae normal.      Pupils: Pupils are equal, round, and reactive to light.   Neck:      Vascular: No carotid bruit.   Cardiovascular:      Rate and Rhythm: Normal rate and regular rhythm.      Heart sounds: Normal heart sounds. No murmur heard.  Pulmonary:      Effort: Pulmonary effort is normal. No respiratory distress.      Breath sounds: Normal breath sounds. No wheezing, rhonchi or rales.   Abdominal:      General: Bowel sounds are normal. There is no distension.      Palpations: Abdomen is soft. There is no mass.      Tenderness: There is no abdominal tenderness. There is no guarding.   Musculoskeletal:      Cervical back: Normal range of motion and neck supple. No rigidity or tenderness.      Right lower leg: No edema.      Left lower leg: No edema.   Lymphadenopathy:      Cervical: No cervical adenopathy.   Skin:     General: Skin is warm and dry.      Capillary Refill: Capillary refill takes less than 2 seconds.      Comments: Multiple dog bite penetrations left hand mild serous drainage from 3 of the wounds.  Triple antibiotic applied to these open wounds nonadherent dressing and gauze cover applied to the wound today.   Neurological:      Mental Status: He is alert. Mental status is at baseline.   Psychiatric:         Mood and Affect: Mood normal.         Behavior: Behavior normal.         Thought Content: Thought content normal.         Judgment: Judgment normal.

## 2024-11-13 NOTE — ASSESSMENT & PLAN NOTE
Less recent hemoglobin A1c is in good range with a reading of 6.4%.  I have requested the patient continue his current diabetic management and follow-up with endocrinology.  Updated comprehensive metabolic profile for fasting blood sugar assessment has been requested.  Lab Results   Component Value Date    HGBA1C 6.4 10/30/2024

## 2024-11-13 NOTE — ASSESSMENT & PLAN NOTE
Pressure assessment today confirms good control of hypertension blood pressure assessment today confirms good control of hypertension I have asked the patient to continue on his lisinopril hydrochlorothiazide 20-12.5 mg 1 tablet twice a day and also continue his carvedilol 25 mg twice a day I have requested an updated comprehensive metabolic profile to evaluate kidney performance and electrolyte balance    Orders:    Comprehensive metabolic panel; Future    UA w Reflex to Microscopic w Reflex to Culture; Future

## 2024-11-14 ENCOUNTER — OFFICE VISIT (OUTPATIENT)
Dept: OBGYN CLINIC | Facility: MEDICAL CENTER | Age: 59
End: 2024-11-14

## 2024-11-14 VITALS — WEIGHT: 279 LBS | HEIGHT: 72 IN | BODY MASS INDEX: 37.79 KG/M2

## 2024-11-14 DIAGNOSIS — M65.949 TENOSYNOVITIS OF FINGER AND HAND: Primary | ICD-10-CM
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
## 2024-11-14 DIAGNOSIS — L08.9: ICD-10-CM

## 2024-11-14 DIAGNOSIS — S61.551S: ICD-10-CM

## 2024-11-14 DIAGNOSIS — S61.452A DOG BITE OF LEFT HAND, INITIAL ENCOUNTER: ICD-10-CM

## 2024-11-14 PROCEDURE — 99024 POSTOP FOLLOW-UP VISIT: CPT | Performed by: ORTHOPAEDIC SURGERY

## 2024-11-14 NOTE — PROGRESS NOTES
HAND & UPPER EXTREMITY OFFICE VISIT   Referred By:  No referring provider defined for this encounter.      Chief Complaint:     Right wrist pain    Surgery:  10/3/2024 - Irrigation and debridement, right wrist, volar and dorsal, possible extensor and flexor tenosynovectomy, any indicated procedures - Right     9/17/24:  Irrigation and debridement right wrist and hand.  Right  2nd, 3rd and 4th dorsal extensor compartment tenosynovectomy.     History of Present Illness:   Patient presents now 6 weeks status post the most recent above surgery. he reports the pain in the right wrist is minimal when at rest. He does still have pain and stiffness when trying to move the right hand and wrist. He has been attending OT since the last visit.  He feels he is making progress but is still having difficulty gripping objects because he cannot make a fist.  He would like to get back to more fine motor activities such as model making and woodworking.    He does also report a new injury to the left hand since the left hand. He was trying to groom his dog when he was bit on 11/9 and 11/13. He was seen by his PCP yesterday and started on Augmentin. He has been doing soaks and daily dressing changes. He denies any significant pain or fevers/chills.     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    FOOT SURGERY  2022    Left Foot    GANGLION CYST EXCISION Left 03/25/2024    Procedure: EXCISION MUCOID CYST, INDEX FINGER DIP;  Surgeon: Carroll Mccarthy MD;  [facility]: [facility];  Service: Orthopedics    GANGLION CYST EXCISION Right 05/20/2024    Procedure: EXCISION MUCOID CYST - RIGHT INDEX AND MIDDLE  FINGERS;  Surgeon: Carroll Mccarthy MD;  [facility];  Service: Orthopedics    HERNIA REPAIR      INCISION AND DRAINAGE  01/16/2024    KIDNEY SURGERY      KNEE SURGERY  1980    MOUTH SURGERY      IL AMPUTATION METATARSAL W/TOE SINGLE Left 6/1/2022    Procedure: RAY RESECTION FOOT;  Surgeon: Hannah Melgoza DPM;  Location[facility];  Service: Podiatry    IL INCISION & DRAINAGE ABSCESS COMPLICATED/MULTIPLE Right 09/17/2024    Procedure: Irrigation and debridement right wrist and hand, any indicated procedures;  Surgeon: Carroll Mccarthy MD;  [facility];  Service: Orthopedics    IL RPR AA HERNIA 1ST < 3 CM REDUCIBLE N/A 7/14/2023    Procedure: REPAIR HERNIA INCISIONAL;  Surgeon: Matt Melo MD;  [facility];  Service: General    IL SYNOVECTOMY EXTENSOR TENDON SHTH WRIST 1 CMPRT Right 10/03/2024    Procedure: Irrigation and debridement, right wrist, volar and dorsal, possible extensor and flexor tenosynovectomy, any indicated procedures;  Surgeon: Carroll Mccarthy MD;  [facility];  Service: Orthopedics    TONSILLECTOMY  1968    Yes    WOUND DEBRIDEMENT Left 4/28/2022    Procedure: Left foot washout;  Surgeon: Hannah Melgoza DPM;  [facility];  Service: Podiatry    WOUND DEBRIDEMENT Left 6/6/2022    Procedure: DEBRIDEMENT WOUND (WASH OUT);  Surgeon: Hannah Melgoza DPM;  [facility];  Service: Podiatry     Family History   Problem Relation Age of Onset    Diabetes Paternal Grandmother     Diabetes Paternal Grandfather     Arthritis Maternal Grandmother      Social History     Socioeconomic History    Marital status: Registered Domestic Partner     Spouse name: Not on file    Number of children: Not on file    Years of education: Not on file    Highest education level: Not on file   Occupational History    Not on file   Tobacco Use    Smoking status: Never    Smokeless tobacco: Never   Vaping Use    Vaping status: Never Used   Substance  and Sexual Activity    Alcohol use: Yes     Alcohol/week: 1.0 standard drink of alcohol     Types: 1 Cans of beer per week     Comment: ocassional    Drug use: Never    Sexual activity: Not Currently     Partners: Female     Birth control/protection: Abstinence   Other Topics Concern    Not on file   Social History Narrative    Not on file     Social Drivers of Health     Financial Resource Strain: Not on file   Food Insecurity: Patient Declined (11/13/2024)    Hunger Vital Sign     Worried About Running Out of Food in the Last Year: Patient declined     Ran Out of Food in the Last Year: Patient declined   Transportation Needs: Patient Declined (11/13/2024)    PRAPARE - Transportation     Lack of Transportation (Medical): Patient declined     Lack of Transportation (Non-Medical): Patient declined   Physical Activity: Not on file   Stress: Not on file   Social Connections: Not on file   Intimate Partner Violence: Not on file   Housing Stability: Patient Declined (11/13/2024)    Housing Stability Vital Sign     Unable to Pay for Housing in the Last Year: Patient declined     Number of Times Moved in the Last Year: 0     Homeless in the Last Year: Patient declined     Scheduled Meds:  Continuous Infusions:No current facility-administered medications for this visit.    PRN Meds:.  Allergies   Allergen Reactions    Cephalexin Hives     Skin peeling  Tolerates penicillins (tolerated unasyn and zosyn)    Pollen Extract Sneezing    Metformin GI Intolerance       Physical Examination:    Ht 6' (1.829 m)   Wt 127 kg (279 lb)   BMI 37.84 kg/m²     Gen: A&Ox3, NAD    Right Upper Extremity:  Dorsal incision almost 100% closed. Very small, submillimeter, open area at the distal-most aspect of the incision  Improving swelling of wrist and hand  Minimal tenderness over the dorsal and volar wrist  Sensation intact to light touch in the axillary median, ulnar, and radial nerve distributions  Limited finger and wrist ROM due to pain  and stiffness  Approximate 50% composite fist  Minimal wrist extension, 30 degrees wrist flexion  Warm, well-perfused digits    Left Upper Extremity:  Multiple small, fairly superficial puncture wounds over dorsal hand  Mild erythema around the puncture sites, no purulence or fluctuance, no visible or expressible drainage  Sensation intact to light touch in the axillary median, ulnar, and radial nerve distributions  Full digital and wrist active ROM  Warm, well-perfused digits    Studies:  10/3/24: One culture sample growing Staph epidermidis in broth only -susceptible to the majority of antibiotics except Bactrim and penicillin.  Anaerobic Culture No anaerobes isolated      Growth in Broth culture only Staphylococcus epidermidis Abnormal            Susceptibility     Staphylococcus epidermidis     LEDA (Preliminary)     Cefazolin ($) <=8.00 ug/ml Susceptible     Clindamycin ($) <=0.25 ug/ml Susceptible     Erythromycin ($$$$) <=0.25 ug/ml Susceptible     Gentamicin ($$) <=4 ug/ml Susceptible     Oxacillin <=0.25 ug/ml Susceptible     Penicillin 0.500 ug/ml Resistant     Tetracycline <=4 ug/ml Susceptible     Trimethoprim + Sulfamethoxazole ($$$) >2/38 ug/ml Resistant     Vancomycin ($) 1.00 ug/ml Susceptible                         Assessment and Plan:  1. Tenosynovitis of finger and hand        2. Dog bite of right wrist with infection, sequela        3. Dog bite of left hand, initial encounter                59 y.o. male presents 6 weeks status post Irrigation and debridement, right wrist, volar and dorsal, possible extensor and flexor tenosynovectomy, any indicated procedures - Right. His right hand and wrist have continued to heal well. There is a very small opening at the distal-most aspect of the incision. We will continue with observation for now and try to allow the area to fully close on its own.  Instructed in wound care.  Encouraged patient to continue ROM exercises as much as tolerated and continue with  OT.     For the left hand, there does not appear to be any signs concerning for a significant infection at this point. he was instructed to complete the entire course of oral antibiotics as prescribed by his PCP. he should continue to monitor for signs of worsening infection including increased pain, redness, swelling, drainage, fevers/chills. He should continue with the soaks and daily dressing changes until the wounds are fully closed.     It is recommended he return to the office in 4 weeks for repeat evaluation.     he expressed understanding of the plan and agreed. We encouraged them to contact our office with any questions or concerns.         Carroll Mccarthy MD  Hand and Upper Extremity Surgery          *This note was dictated using Dragon voice recognition software. Please excuse any word substitutions or errors.*      Scribe Attestation      I,:  Yesenia Carrasquillo PA-C am acting as a scribe while in the presence of the attending physician.:       I,:  Carroll Mccarthy MD personally performed the services described in this documentation    as scribed in my presence.:

## 2024-11-15 ENCOUNTER — OFFICE VISIT (OUTPATIENT)
Dept: OCCUPATIONAL THERAPY | Age: 59
End: 2024-11-15
Payer: MEDICARE

## 2024-11-15 DIAGNOSIS — S61.452D ANIMAL BITE OF LEFT HAND WITH INFECTION, SUBSEQUENT ENCOUNTER: ICD-10-CM

## 2024-11-15 DIAGNOSIS — M65.949 TENOSYNOVITIS OF FINGER AND HAND: Primary | ICD-10-CM

## 2024-11-15 DIAGNOSIS — L08.9 ANIMAL BITE OF LEFT HAND WITH INFECTION, SUBSEQUENT ENCOUNTER: ICD-10-CM

## 2024-11-15 DIAGNOSIS — Z47.89 AFTERCARE FOLLOWING SURGERY OF THE MUSCULOSKELETAL SYSTEM: ICD-10-CM

## 2024-11-15 PROCEDURE — 97140 MANUAL THERAPY 1/> REGIONS: CPT

## 2024-11-15 PROCEDURE — 97110 THERAPEUTIC EXERCISES: CPT

## 2024-11-15 NOTE — PROGRESS NOTES
"Daily Note     Today's date: 11/15/2024  Patient name: Yoni Castillo  : 1965  MRN: [id number]  Referring provider: Yesenia Carrasquillo PA-C  Dx:   Encounter Diagnosis     ICD-10-CM    1. Tenosynovitis of finger and hand  M65.949       2. Animal bite of left hand with infection, subsequent encounter  S61.452D     L08.9       3. Aftercare following surgery of the musculoskeletal system  Z47.89             Start Time: 930  Stop Time: 1015  Total time in clinic (min): 45 minutes    Subjective: \"I saw the doctor yesterday and he said I'm doing everything right.\"      Objective: See treatment diary below      Assessment: Tolerated treatment well. Pt continues with significant swelling, stiffness and decreased use of his R hand. He has profound extrinsic tightness from the scar tissue on the dorsum of his hand/wrist. Reviewed all AROM and stretching exercises. Patient would benefit from continued OT      Plan: Continue per plan of care.      Precautions: right hand and wrist s/p I&D 10/3   Manuals   eval  11/11  11/15        Edema mob    5  5'       Scar tissue mob  10' 5'      STM/IASTM                                         Ther Ex                  Scap squeeze              Shoulder ROM          Elbow ROM         Forearm AROM/PROM         Wrist AROM/PROM  20' 20'      Digit AROM/PROM  8' 5'    Wrist maze x10     Thumb AROM/PROM        Digit abduction        Intrinsic stretch                Graded strength                                    Ther Activity              Grasp/release    Dowel squeeze into pink putty with digits wrapped in flexion         Dexterity           Mari's Carry PRN           Small tool use/small part activity                                               Neuro Re-Ed                                           Ortho Fit             Static progressive splinting for wrist/finger flexion/extension                              Modalities             HP  5'  5'  5'                              "

## 2024-11-17 DIAGNOSIS — M51.369 DDD (DEGENERATIVE DISC DISEASE), LUMBAR: ICD-10-CM

## 2024-11-17 DIAGNOSIS — G89.18 ACUTE POST-OPERATIVE PAIN: ICD-10-CM

## 2024-11-17 DIAGNOSIS — M79.605 LEFT LEG PAIN: ICD-10-CM

## 2024-11-17 DIAGNOSIS — M65.90 TENOSYNOVITIS: ICD-10-CM

## 2024-11-17 DIAGNOSIS — Z47.89 AFTERCARE FOLLOWING SURGERY OF THE MUSCULOSKELETAL SYSTEM: ICD-10-CM

## 2024-11-17 DIAGNOSIS — E11.69 TYPE 2 DIABETES MELLITUS WITH OTHER SPECIFIED COMPLICATION, WITHOUT LONG-TERM CURRENT USE OF INSULIN (HCC): ICD-10-CM

## 2024-11-17 DIAGNOSIS — M54.40 LOW BACK PAIN WITH SCIATICA, SCIATICA LATERALITY UNSPECIFIED, UNSPECIFIED BACK PAIN LATERALITY, UNSPECIFIED CHRONICITY: ICD-10-CM

## 2024-11-18 ENCOUNTER — OFFICE VISIT (OUTPATIENT)
Dept: OCCUPATIONAL THERAPY | Age: 59
End: 2024-11-18
Payer: MEDICARE

## 2024-11-18 DIAGNOSIS — M65.949 TENOSYNOVITIS OF FINGER AND HAND: Primary | ICD-10-CM

## 2024-11-18 DIAGNOSIS — L08.9 ANIMAL BITE OF LEFT HAND WITH INFECTION, SUBSEQUENT ENCOUNTER: ICD-10-CM

## 2024-11-18 DIAGNOSIS — S61.452D ANIMAL BITE OF LEFT HAND WITH INFECTION, SUBSEQUENT ENCOUNTER: ICD-10-CM

## 2024-11-18 DIAGNOSIS — Z47.89 AFTERCARE FOLLOWING SURGERY OF THE MUSCULOSKELETAL SYSTEM: ICD-10-CM

## 2024-11-18 PROCEDURE — 97140 MANUAL THERAPY 1/> REGIONS: CPT

## 2024-11-18 PROCEDURE — 97110 THERAPEUTIC EXERCISES: CPT

## 2024-11-18 RX ORDER — TRAMADOL HYDROCHLORIDE 50 MG/1
100 TABLET ORAL
Qty: 60 TABLET | Refills: 0 | Status: SHIPPED | OUTPATIENT
Start: 2024-11-18

## 2024-11-18 RX ORDER — LIDOCAINE 50 MG/G
1 PATCH TOPICAL DAILY
Qty: 30 PATCH | Refills: 5 | Status: SHIPPED | OUTPATIENT
Start: 2024-11-18

## 2024-11-18 NOTE — PROGRESS NOTES
"Daily Note     Today's date: 2024  Patient name: Yoni Castillo  : 1965  MRN: [id number]  Referring provider: Yesenia Carrasquillo PA-C  Dx:   Encounter Diagnosis     ICD-10-CM    1. Tenosynovitis of finger and hand  M65.949       2. Animal bite of left hand with infection, subsequent encounter  S61.452D     L08.9       3. Aftercare following surgery of the musculoskeletal system  Z47.89             Start Time: 1217  Stop Time: 1300  Total time in clinic (min): 43 minutes    Subjective: \"It feels like a hand.\"      Objective: See treatment diary below      Assessment: Tolerated treatment well. Pt continues with significant scar tissue and stiffness in his wrist and fingers. He is compliant with his scar mobilization at home. Patient would benefit from continued OT      Plan: Continue per plan of care.      Precautions: right hand and wrist s/p I&D 10/3   Manuals   eval  11/11  11/15  11/18      Edema mob    5  5'  5'     Scar tissue mob  10' 5' 5'     STM/IASTM                                         Ther Ex                  Scap squeeze              Shoulder ROM          Elbow ROM         Forearm AROM/PROM         Wrist AROM/PROM  20' 20' 20'     Digit AROM/PROM  8' 5'    Wrist maze x10 5'    Wrist maze x5    Ball roll with stretch x5 with end-range stretch    Thumb AROM/PROM        Digit abduction        Intrinsic stretch                Graded strength                                    Ther Activity              Grasp/release    Dowel squeeze into pink putty with digits wrapped in flexion         Dexterity           Mari's Carry PRN           Small tool use/small part activity                                               Neuro Re-Ed                                           Ortho Fit             Static progressive splinting for wrist/finger flexion/extension                              Modalities             HP  5'  5'  5'  5'                              "

## 2024-11-19 LAB
MYCOBACTERIUM SPEC CULT: NORMAL
RHODAMINE-AURAMINE STN SPEC: NORMAL

## 2024-11-19 RX ORDER — METHOCARBAMOL 750 MG/1
1500 TABLET, FILM COATED ORAL EVERY 8 HOURS SCHEDULED
Qty: 30 TABLET | Refills: 0 | Status: SHIPPED | OUTPATIENT
Start: 2024-11-19

## 2024-11-19 RX ORDER — OXYCODONE HYDROCHLORIDE 5 MG/1
5 TABLET ORAL EVERY 6 HOURS PRN
Qty: 10 TABLET | Refills: 0 | Status: SHIPPED | OUTPATIENT
Start: 2024-11-19

## 2024-11-19 NOTE — TELEPHONE ENCOUNTER
Medication:  PDMP      11/18/2024 11/18/2024 traMADol HCL (Tablet) 60.0 30 50 MG 20.0 [facility] Commercial Insurance 0 / 0 PA   1 [id number] 10/31/2024 10/31/2024 oxyCODONE HCL (Tablet) 30.0 8 5 MG 28.12 [facility] 0 / 0 PA   1 [id number] 10/21/2024 10/21/2024 traMADol HCL (Tablet) 60.0 30 50 [facility] 20.[facility] 0 / 0 PA   1 [id number] 10/07/2024 10/07/2024 oxyCODONE HCL (Tablet) 30.0 5 5 MG 45.0 [facility] 0 / 0 PA   1 [id number] 10/03/2024 10/03/2024 oxyCODONE HCL (Tablet) 10.0 4 5 MG 18.75 CORBY ABDULLAHI [facility]     Refill must be reviewed and completed by the office or provider. The refill is unable to be approved or denied by the medication management team.

## 2024-11-20 NOTE — PROGRESS NOTES
Ambulatory Visit  Name: Yoni Castillo      : 1965      MRN: [id number]  Encounter Provider: Adis Hamilton MD  Encounter Date: 2024   Encounter department: [facility] UROLOGY [place]    Assessment & Plan  Right renal stone  My review of imaging demonstrates the following stone burden: 2.6x 2cm right renal pelvis stone with additional 1 cm of stone in a lower pole anterior calyx.     I explained that indications for stone treatment include: to alleviate pain and/or obstruction, prevention of further stone growth, eradication any causative organisms if the stone is infectious in etiology, resolution of hematuria secondary due to stone(s), and preservation of kidney function.     We discussed the conventional treatment options for kidney stones of this size and location - ureteroscopy (URS), and percutaneous nephrolithotomy (PCNL)     I explained the procedure of ureteroscopy. I explained that with ureteroscopy laser lithotripsy is often used to fragment and dust the stone(s) into smaller pieces that can be both actively extracted and pass spontaneously. A combination of laser setting is used to most efficiently and effectively treat the stone(s) in question. I explained that given the need to traverse the ureter a ureteral stent is often left indwelling at the end of the procedure and the stent is removed anywhere from 1-4 weeks afterwards via cystoscopy, tether, or a snare device. I explained the risks of ureteroscopy include, but are not limited to bleeding possibly requiring blood transfusion, pain, infection with possible sequela such as sepsis, septic shock, and even death, failure to access the kidney due to a naturally narrow ureter with need for stent insertion and rescheduling of definitive treatment, failure to treat all the stone at one setting and the need for a secondary procedure, potential injury to the urethra, bladder, ureter or kidneys, leading all the way up to the most  severe complication of kidney loss, possible stricture formation in the urethra, possible stricture formation in the ureter, possible need for long-term drainage with stent or nephrostomy tube, and possible need for reconstructive surgery in the future if a stricture forms in a delayed fashion.    I discussed that percutaneous nephrolithotomy (PCNL) is the treatment of choice for large, complex, and partial and complete staghorn calculi based on superior outcomes and acceptably low morbidity. In addition, advances in instrumentation and technique have improved stone-free rates, increased treatment efficiency, and reduced morbidity thereby favoring PCNL for these stones. I described that the literature stone size to consider PCNL is 2 cm and, while I believe that she would most likely be cleared in one stage with PCNL, that the risks of the procedure are significantly higher than for ureteroscopy and may not be necessary.    I explained that the risks of PCNL include, but are not limited to, bleeding possibly requiring blood transfusion, injury to the kidney, ureter, bladder or other urologic organs, possible injury to adjacent non-urologic organs such as the spleen on the left and the liver on the right, possible bowel injury, possible injury to the pleura or lung requiring chest tube drainage, possible need for prolonged stent or nephrostomy drainage, bleeding requiring an interventional procedure for angiography with or without embolization, possible need for nephrectomy, in addition to those discussed for ureteroscopy.    I explained that there are other risks of surgery from medical and anesthesia perspectives. Some of these include MI, CVA, DVT, PE, pneumonia, position related injuries, etc. If these or any other issues were to arise we would manage them accordingly. It is possible the patient could require additional treatment or consultation with non-urologic physicians and may prolong the hospital stay or  require further treatment after discharge. The patient understood these risks, including the risk of death as a complication of surgery, and all questions were answered.     Based on the clinical situation I recommended staged ureteroscopy as the patient has significant medical competing risks and a large skin to stone distance that may make PCNL unacceptably risky for him and less likely to be successful in one procedure.     Despite this, and after exhaustive discussion of risks and alternative, he would much prefer to proceed with right PCNL. I do believe that he is fully informed of the risks.     - Schedule for right PCNL with hybrid room for simultaneous access by IR  - Urine culture prior  - Will need cardiology clearance prior. He will do his best to keep his A1c down         History of Present Illness     Yoni Castillo is a 59 y.o. male who presents for follow up regarding nephrolithiasis. The patient has a 2.6 cm right renal pelvis stone. He previously saw our AP team in the office and PCNL vs ureteroscopy was discussed. He was referred for additional surgical discussion.     He occasionally has right flank pain, however he denies infections and hematuria.       He has done extensive research on treatment options. He has had ureteroscopies in the past and has had issues with residual and retained stone fragments. He is most interested in the highest likelihood of clearing his stone burden in as few procedures as possible and removing all residual fragments. He has had a history of spontaneous subcapsular hematoma x2 over 10 years ago. Etiology was never determined per his report.       History obtained from : patient    AUA SYMPTOM SCORE      Flowsheet Row Most Recent Value   AUA SYMPTOM SCORE    How often have you had a sensation of not emptying your bladder completely after you finished urinating? 1 (P)     How often have you had to urinate again less than two hours after you finished urinating? 1 (P)      How often have you found you stopped and started again several times when you urinate? 0 (P)     How often have you found it difficult to postpone urination? 0 (P)     How often have you had a weak urinary stream? 1 (P)     How often have you had to push or strain to begin urination? 1 (P)     How many times did you most typically get up to urinate from the time you went to bed at night until the time you got up in the morning? 2 (P)     Quality of Life: If you were to spend the rest of your life with your urinary condition just the way it is now, how would you feel about that? 5 (P)     AUA SYMPTOM SCORE 6 (P)              Objective     There were no vitals taken for this visit.  Physical Exam  There were no vitals filed for this visit.   General: Well appearing, no acute distress   HEENT: normocephalic, atraumatic  Eyes: extraocular movements intact  Cardiovascular: normal rate   Respiratory: no respiratory distress, effort normal   Abdominal: Non-distended, non-tender   : Deferred  MSK: Grossly normal range of motion   Neurological: No gross focal deficits  Behavioral: Normal mood and affect     Results  Lab Results   Component Value Date    PSA 0.6 09/16/2022    PSA 0.6 07/26/2022     Lab Results   Component Value Date    GLUCOSE 112 10/03/2024    CALCIUM 8.7 09/21/2024    K 3.8 09/21/2024    CO2 27 10/03/2024     09/21/2024    BUN 23 09/21/2024    CREATININE 1.47 (H) 09/21/2024     Lab Results   Component Value Date    WBC 5.68 09/21/2024    HGB 10.9 (L) 10/03/2024    HCT 32 (L) 10/03/2024    MCV 95 09/21/2024     09/21/2024       Imaging  I have personally reviewed pertinent films in PACS and my interpretation is CT scan reviewed showing a large 2.6 cm x 2 cm right renal pelvis stone with additional 1 cm stone in the anterior lower pole calyx without hydronephrosis. No evidence of residual subcapsular hematoma or renal bleeding.          Office Urine Dip  No results found for this or any  previous visit (from the past hour).]    Administrative Statements

## 2024-11-21 ENCOUNTER — OFFICE VISIT (OUTPATIENT)
Dept: OCCUPATIONAL THERAPY | Age: 59
End: 2024-11-21
Payer: MEDICARE

## 2024-11-21 ENCOUNTER — OFFICE VISIT (OUTPATIENT)
Dept: UROLOGY | Facility: CLINIC | Age: 59
End: 2024-11-21
Payer: MEDICARE

## 2024-11-21 VITALS
BODY MASS INDEX: 38.47 KG/M2 | HEIGHT: 72 IN | SYSTOLIC BLOOD PRESSURE: 120 MMHG | WEIGHT: 284 LBS | DIASTOLIC BLOOD PRESSURE: 82 MMHG | HEART RATE: 75 BPM | OXYGEN SATURATION: 95 %

## 2024-11-21 DIAGNOSIS — N20.0 RIGHT RENAL STONE: Primary | ICD-10-CM

## 2024-11-21 DIAGNOSIS — S61.452D ANIMAL BITE OF LEFT HAND WITH INFECTION, SUBSEQUENT ENCOUNTER: ICD-10-CM

## 2024-11-21 DIAGNOSIS — Z47.89 AFTERCARE FOLLOWING SURGERY OF THE MUSCULOSKELETAL SYSTEM: ICD-10-CM

## 2024-11-21 DIAGNOSIS — M65.949 TENOSYNOVITIS OF FINGER AND HAND: Primary | ICD-10-CM

## 2024-11-21 DIAGNOSIS — L08.9 ANIMAL BITE OF LEFT HAND WITH INFECTION, SUBSEQUENT ENCOUNTER: ICD-10-CM

## 2024-11-21 PROCEDURE — 97110 THERAPEUTIC EXERCISES: CPT

## 2024-11-21 PROCEDURE — 97140 MANUAL THERAPY 1/> REGIONS: CPT

## 2024-11-21 PROCEDURE — 99214 OFFICE O/P EST MOD 30 MIN: CPT

## 2024-11-21 RX ORDER — CIPROFLOXACIN 2 MG/ML
400 INJECTION, SOLUTION INTRAVENOUS ONCE
OUTPATIENT
Start: 2024-11-21 | End: 2024-11-21

## 2024-11-21 NOTE — PROGRESS NOTES
"Daily Note     Today's date: 2024  Patient name: Yoni Castillo  : 1965  MRN: [id number]  Referring provider: Yesenia Carrasquillo PA-C  Dx:   Encounter Diagnosis     ICD-10-CM    1. Tenosynovitis of finger and hand  M65.949       2. Animal bite of left hand with infection, subsequent encounter  S61.452D     L08.9       3. Aftercare following surgery of the musculoskeletal system  Z47.89             Start Time: 1145  Stop Time: 1228  Total time in clinic (min): 43 minutes    Subjective: \"I can  things better.\"      Objective: See treatment diary below    Wrist flexion: 26 (was 18)  Wrist extension: 40 (was 27)    Assessment: Tolerated treatment well. Pt demonstrates improvements with AROM compared to eval. Reviewed stretching techniques. Patient would benefit from continued OT      Plan: Continue per plan of care.      Precautions: right hand and wrist s/p I&D 10/3   Manuals   eval  11/11  11/15  11/18  11/21    Edema mob    5  5'  5'  5   Scar tissue mob  10' 5' 5' 5    STM/IASTM                                         Ther Ex                  Scap squeeze              Shoulder ROM          Elbow ROM         Forearm AROM/PROM         Wrist AROM/PROM  20' 20' 20'  20   Digit AROM/PROM  8' 5'    Wrist maze x10 5'    Wrist maze x5    Ball roll with stretch x5 with end-range stretch 5    Wrist PROM 25'   Thumb AROM/PROM        Digit abduction        Intrinsic stretch                Graded strength                                    Ther Activity              Grasp/release    Dowel squeeze into pink putty with digits wrapped in flexion         Dexterity           Mari's Carry PRN           Small tool use/small part activity                                               Neuro Re-Ed                                           Ortho Fit             Static progressive splinting for wrist/finger flexion/extension                              Modalities             HP  5'  5'  5'  5'  5               "

## 2024-11-24 DIAGNOSIS — I10 ESSENTIAL HYPERTENSION: ICD-10-CM

## 2024-11-25 ENCOUNTER — OFFICE VISIT (OUTPATIENT)
Dept: OCCUPATIONAL THERAPY | Age: 59
End: 2024-11-25
Payer: MEDICARE

## 2024-11-25 ENCOUNTER — ESTABLISHED COMPREHENSIVE EXAM (OUTPATIENT)
Dept: URBAN - METROPOLITAN AREA CLINIC 6 | Facility: CLINIC | Age: 59
End: 2024-11-25

## 2024-11-25 DIAGNOSIS — L08.9 ANIMAL BITE OF LEFT HAND WITH INFECTION, SUBSEQUENT ENCOUNTER: ICD-10-CM

## 2024-11-25 DIAGNOSIS — M65.949 TENOSYNOVITIS OF FINGER AND HAND: Primary | ICD-10-CM

## 2024-11-25 DIAGNOSIS — H43.813: ICD-10-CM

## 2024-11-25 DIAGNOSIS — E11.3293: ICD-10-CM

## 2024-11-25 DIAGNOSIS — H26.492: ICD-10-CM

## 2024-11-25 DIAGNOSIS — S61.452D ANIMAL BITE OF LEFT HAND WITH INFECTION, SUBSEQUENT ENCOUNTER: ICD-10-CM

## 2024-11-25 DIAGNOSIS — H40.013: ICD-10-CM

## 2024-11-25 DIAGNOSIS — Z47.89 AFTERCARE FOLLOWING SURGERY OF THE MUSCULOSKELETAL SYSTEM: ICD-10-CM

## 2024-11-25 PROCEDURE — 97110 THERAPEUTIC EXERCISES: CPT

## 2024-11-25 PROCEDURE — 97760 ORTHOTIC MGMT&TRAING 1ST ENC: CPT

## 2024-11-25 PROCEDURE — 97140 MANUAL THERAPY 1/> REGIONS: CPT

## 2024-11-25 PROCEDURE — 92014 COMPRE OPH EXAM EST PT 1/>: CPT

## 2024-11-25 PROCEDURE — 92134 CPTRZ OPH DX IMG PST SGM RTA: CPT

## 2024-11-25 ASSESSMENT — TONOMETRY
OS_IOP_MMHG: 19
OD_IOP_MMHG: 20

## 2024-11-25 ASSESSMENT — KERATOMETRY
OS_AXISANGLE_DEGREES: 180
OS_K1POWER_DIOPTERS: 38.50
OS_AXISANGLE2_DEGREES: 90
OS_K2POWER_DIOPTERS: 38.50

## 2024-11-25 ASSESSMENT — VISUAL ACUITY
OD_SC: 20/20
OS_SC: 20/25

## 2024-11-25 NOTE — PROGRESS NOTES
"Daily Note     Today's date: 2024  Patient name: Yoni Castillo  : 1965  MRN: [id number]  Referring provider: Yesenia Carrasquillo PA-C  Dx:   Encounter Diagnosis     ICD-10-CM    1. Tenosynovitis of finger and hand  M65.949       2. Animal bite of left hand with infection, subsequent encounter  S61.452D     L08.9       3. Aftercare following surgery of the musculoskeletal system  Z47.89               Start Time: 1400  Stop Time: 1445  Total time in clinic (min): 45 minutes    Subjective: \"I can  things better.\"      Objective: See treatment diary below    Wrist flexion: 26 (was 18)  Wrist extension: 40 (was 27)    Assessment: Tolerated treatment well. Pt demonstrates improvements with AROM compared to eval. Reviewed stretching techniques. Patient would benefit from continued OT      Plan: Continue per plan of care.      Precautions: right hand and wrist s/p I&D 10/3   Manuals   eval  11/11  11/15  11/18  11/21 11/25    Edema mob    5  5'  5'  5 5   Scar tissue mob  10' 5' 5' 5 5    STM/IASTM                                            Ther Ex                   Scap squeeze               Shoulder ROM           Elbow ROM          Forearm AROM/PROM          Wrist AROM/PROM  20' 20' 20'  20 10'   Digit AROM/PROM  8' 5'    Wrist maze x10 5'    Wrist maze x5    Ball roll with stretch x5 with end-range stretch 5    Wrist PROM 25' Wrist/digit PROM 10'   Thumb AROM/PROM         Digit abduction         Intrinsic stretch                  Graded strength                                       Ther Activity               Grasp/release    Dowel squeeze into pink putty with digits wrapped in flexion          Dexterity            Mari's Carry PRN            Small tool use/small part activity                                                   Neuro Re-Ed                                               Ortho Fit              Static progressive splinting for wrist/finger flexion/extension      Fabricated base for MP/PIP " flexion splint                           Modalities              HP  5'  5'  5'  5'  5 5'

## 2024-11-26 RX ORDER — CARVEDILOL 25 MG/1
25 TABLET ORAL 2 TIMES DAILY WITH MEALS
Qty: 200 TABLET | Refills: 1 | Status: SHIPPED | OUTPATIENT
Start: 2024-11-26

## 2024-11-26 RX ORDER — LISINOPRIL AND HYDROCHLOROTHIAZIDE 12.5; 2 MG/1; MG/1
1 TABLET ORAL 2 TIMES DAILY
Qty: 200 TABLET | Refills: 1 | Status: SHIPPED | OUTPATIENT
Start: 2024-11-26

## 2024-12-02 ENCOUNTER — OFFICE VISIT (OUTPATIENT)
Dept: OCCUPATIONAL THERAPY | Age: 59
End: 2024-12-02
Payer: MEDICARE

## 2024-12-02 DIAGNOSIS — L08.9 ANIMAL BITE OF LEFT HAND WITH INFECTION, SUBSEQUENT ENCOUNTER: ICD-10-CM

## 2024-12-02 DIAGNOSIS — S61.452D ANIMAL BITE OF LEFT HAND WITH INFECTION, SUBSEQUENT ENCOUNTER: ICD-10-CM

## 2024-12-02 DIAGNOSIS — Z47.89 AFTERCARE FOLLOWING SURGERY OF THE MUSCULOSKELETAL SYSTEM: ICD-10-CM

## 2024-12-02 DIAGNOSIS — M65.949 TENOSYNOVITIS OF FINGER AND HAND: Primary | ICD-10-CM

## 2024-12-02 PROCEDURE — 97110 THERAPEUTIC EXERCISES: CPT

## 2024-12-02 PROCEDURE — 97140 MANUAL THERAPY 1/> REGIONS: CPT

## 2024-12-02 PROCEDURE — 97760 ORTHOTIC MGMT&TRAING 1ST ENC: CPT

## 2024-12-02 NOTE — PROGRESS NOTES
"Daily Note     Today's date: 2024  Patient name: Yoni Castillo  : 1965  MRN: [id number]  Referring provider: Yesenia Carrasquillo PA-C  Dx:   Encounter Diagnosis     ICD-10-CM    1. Tenosynovitis of finger and hand  M65.949       2. Animal bite of left hand with infection, subsequent encounter  S61.452D     L08.9       3. Aftercare following surgery of the musculoskeletal system  Z47.89                 Start Time: 1442  Stop Time: 1537  Total time in clinic (min): 55 minutes    Subjective: \"I'm still feeling that scar tissue.\"      Objective: See treatment diary below    Assessment: Tolerated treatment well. Fabricated MCP static progressive flexion orthosis, edu on donning and recommended wear schedule. Patient would benefit from continued OT      Plan: Continue per plan of care.      Precautions: right hand and wrist s/p I&D 10/3   Manuals   eval  11/11  11/15  11/18  11/21 11/25 12'/2    Edema mob    5  5'  5'  5 5 5   Scar tissue mob  10' 5' 5' 5 5 5    STM/IASTM                                               Ther Ex                    Scap squeeze                Shoulder ROM            Elbow ROM           Forearm AROM/PROM           Wrist AROM/PROM  20' 20' 20'  20 10' 10'   Digit AROM/PROM  8' 5'    Wrist maze x10 5'    Wrist maze x5    Ball roll with stretch x5 with end-range stretch 5    Wrist PROM 25' Wrist/digit PROM 10' 10'   Thumb AROM/PROM          Digit abduction          Intrinsic stretch                    Graded strength                                          Ther Activity                Grasp/release    Dowel squeeze into pink putty with digits wrapped in flexion           Dexterity             Mari's Carry PRN             Small tool use/small part activity                                                       Neuro Re-Ed                                                   Ortho Fit               Static progressive splinting for wrist/finger flexion/extension      Fabricated base for " MP/PIP flexion splint Static progressive flexion orthosis  20'                             Modalities               HP  5'  5'  5'  5'  5 5' 5'

## 2024-12-04 NOTE — PROGRESS NOTES
"Daily Note     Today's date: 2024  Patient name: Yoni Castillo  : 1965  MRN: [id number]  Referring provider: Yesenia Carrasquillo PA-C  Dx:   Encounter Diagnosis     ICD-10-CM    1. Tenosynovitis of finger and hand  M65.949       2. Animal bite of left hand with infection, subsequent encounter  S61.452D     L08.9       3. Aftercare following surgery of the musculoskeletal system  Z47.89                 Start Time: 1445  Stop Time: 1530  Total time in clinic (min): 45 minutes    Subjective: \"The thumb is straightening more.\"      Objective: See treatment diary below    Assessment: Tolerated treatment well. Pt noticing improvement with thumb extension. Continues with inability to make a fist. Some improvement following PROM to his digits. Patient would benefit from continued OT      Plan: Continue per plan of care.      Precautions: right hand and wrist s/p I&D 10/3   Manuals   eval  11/11  11/15  11/18  11/21 11/25 12/    Edema mob    5  5'  5'  5 5 5 5   Scar tissue mob  10' 5' 5' 5 5 5 5    STM/IASTM                                                  Ther Ex                     Scap squeeze                 Shoulder ROM             Elbow ROM            Forearm AROM/PROM            Wrist AROM/PROM  20' 20' 20'  20 10' 10' 5'   Digit AROM/PROM  8' 5'    Wrist maze x10 5'    Wrist maze x5    Ball roll with stretch x5 with end-range stretch 5    Wrist PROM 25' Wrist/digit PROM 10' 10' 15'    Wrist maze x10    Pink putty press with 2lb dumbbell   Thumb AROM/PROM           Digit abduction           Intrinsic stretch                      Graded strength                                             Ther Activity                 Grasp/release    Dowel squeeze into pink putty with digits wrapped in flexion            Dexterity              Mari's Carry PRN              Small tool use/small part activity                                                           Neuro Re-Ed                                    "                    Ortho Fit                Static progressive splinting for wrist/finger flexion/extension      Fabricated base for MP/PIP flexion splint Static progressive flexion orthosis  20'                                Modalities                HP  5'  5'  5'  5'  5 5' 5' 5'

## 2024-12-05 ENCOUNTER — OFFICE VISIT (OUTPATIENT)
Dept: OCCUPATIONAL THERAPY | Age: 59
End: 2024-12-05
Payer: MEDICARE

## 2024-12-05 DIAGNOSIS — L08.9 ANIMAL BITE OF LEFT HAND WITH INFECTION, SUBSEQUENT ENCOUNTER: ICD-10-CM

## 2024-12-05 DIAGNOSIS — S61.452D ANIMAL BITE OF LEFT HAND WITH INFECTION, SUBSEQUENT ENCOUNTER: ICD-10-CM

## 2024-12-05 DIAGNOSIS — Z47.89 AFTERCARE FOLLOWING SURGERY OF THE MUSCULOSKELETAL SYSTEM: ICD-10-CM

## 2024-12-05 DIAGNOSIS — M65.949 TENOSYNOVITIS OF FINGER AND HAND: Primary | ICD-10-CM

## 2024-12-05 PROCEDURE — 97110 THERAPEUTIC EXERCISES: CPT

## 2024-12-05 PROCEDURE — 97140 MANUAL THERAPY 1/> REGIONS: CPT

## 2024-12-09 ENCOUNTER — OFFICE VISIT (OUTPATIENT)
Dept: OCCUPATIONAL THERAPY | Age: 59
End: 2024-12-09
Payer: MEDICARE

## 2024-12-09 DIAGNOSIS — L08.9 ANIMAL BITE OF LEFT HAND WITH INFECTION, SUBSEQUENT ENCOUNTER: ICD-10-CM

## 2024-12-09 DIAGNOSIS — Z47.89 AFTERCARE FOLLOWING SURGERY OF THE MUSCULOSKELETAL SYSTEM: ICD-10-CM

## 2024-12-09 DIAGNOSIS — M65.949 TENOSYNOVITIS OF FINGER AND HAND: Primary | ICD-10-CM

## 2024-12-09 DIAGNOSIS — S61.452D ANIMAL BITE OF LEFT HAND WITH INFECTION, SUBSEQUENT ENCOUNTER: ICD-10-CM

## 2024-12-09 PROCEDURE — 97140 MANUAL THERAPY 1/> REGIONS: CPT

## 2024-12-09 PROCEDURE — 97110 THERAPEUTIC EXERCISES: CPT

## 2024-12-09 NOTE — PROGRESS NOTES
Daily Note     Today's date: 2024  Patient name: Yoni Castillo  : 1965  MRN: [id number]  Referring provider: Yesenia Carrasquillo PA-C  Dx:   Encounter Diagnosis     ICD-10-CM    1. Tenosynovitis of finger and hand  M65.949       2. Animal bite of left hand with infection, subsequent encounter  S61.452D     L08.9       3. Aftercare following surgery of the musculoskeletal system  Z47.89                      Subjective: I need the splint adjusted. I don't feel like I've made progress in the last two weeks      Objective: See treatment diary below      Assessment: Tolerated treatment well. Patient exhibited good technique with therapeutic exercises. Improved digit flexion after IASTM and intrinsic stretching. Patient instructed in intrinsic stretching for HEP. Cupping to dorsal scar this date.       Plan: Continue per plan of care. Dynamic splint adjustment as needed     Precautions: right hand and wrist s/p I&D 10/3   Manuals  12/9/24  11/11  11/15  11/18  11/21 11/25 12'/    Edema mob  5'  5  5'  5'  5 5 5 5   Scar tissue mob 5' 10' 5' 5' 5 5 5 5    STM/IASTM  IASTM intrinsics 5'               Cupping  dorsal scar 5'                               Ther Ex                     Scap squeeze                 Shoulder ROM             Elbow ROM            Forearm AROM/PROM            Wrist AROM/PROM 5' 20' 20' 20'  20 10' 10' 5'   Digit AROM/PROM 12' 8' 5'    Wrist maze x10 5'    Wrist maze x5    Ball roll with stretch x5 with end-range stretch 5    Wrist PROM 25' Wrist/digit PROM 10' 10' 15'    Wrist maze x10    Pink putty press with 2lb dumbbell   Thumb AROM/PROM           Digit abduction           Intrinsic stretch 3x10                     Graded strength                                             Ther Activity                 Grasp/release    Dowel squeeze into pink putty with digits wrapped in flexion            Dexterity              Mari's Carry PRN              Small tool use/small part activity                                                            Neuro Re-Ed                                                       Ortho Fit                Static progressive splinting for wrist/finger flexion/extension      Fabricated base for MP/PIP flexion splint Static progressive flexion orthosis  20'                                Modalities                HP  5'  5'  5'  5'  5 5' 5' 5'

## 2024-12-10 NOTE — TELEPHONE ENCOUNTER
Additional Information   Negative: Is this related to a work injury?  Negative: Is this related to an MVA?  Negative: Are you currently recieving homecare services?  Negative: Has the patient had unexplained weight loss?  Negative: Does the patient have a fever?  Negative: Is the patient experiencing urine retention?  Negative: Is the patient experiencing acute drop foot or paralysis?     Protocols used: [facility] COMPREHENSIVE SPINE PROGRAM PROTOCOL 10-Dec-2024 18:40

## 2024-12-11 DIAGNOSIS — M51.369 DDD (DEGENERATIVE DISC DISEASE), LUMBAR: ICD-10-CM

## 2024-12-11 DIAGNOSIS — M65.90 TENOSYNOVITIS: ICD-10-CM

## 2024-12-11 DIAGNOSIS — Z47.89 AFTERCARE FOLLOWING SURGERY OF THE MUSCULOSKELETAL SYSTEM: ICD-10-CM

## 2024-12-11 DIAGNOSIS — G89.18 ACUTE POST-OPERATIVE PAIN: ICD-10-CM

## 2024-12-11 RX ORDER — OXYCODONE HYDROCHLORIDE 5 MG/1
5 TABLET ORAL EVERY 6 HOURS PRN
Qty: 10 TABLET | Refills: 0 | Status: SHIPPED | OUTPATIENT
Start: 2024-12-11

## 2024-12-11 RX ORDER — METHOCARBAMOL 750 MG/1
1500 TABLET, FILM COATED ORAL EVERY 8 HOURS SCHEDULED
Qty: 30 TABLET | Refills: 0 | Status: SHIPPED | OUTPATIENT
Start: 2024-12-11

## 2024-12-12 ENCOUNTER — OFFICE VISIT (OUTPATIENT)
Dept: OCCUPATIONAL THERAPY | Age: 59
End: 2024-12-12
Payer: MEDICARE

## 2024-12-12 DIAGNOSIS — S61.452D ANIMAL BITE OF LEFT HAND WITH INFECTION, SUBSEQUENT ENCOUNTER: ICD-10-CM

## 2024-12-12 DIAGNOSIS — L08.9 ANIMAL BITE OF LEFT HAND WITH INFECTION, SUBSEQUENT ENCOUNTER: ICD-10-CM

## 2024-12-12 DIAGNOSIS — Z47.89 AFTERCARE FOLLOWING SURGERY OF THE MUSCULOSKELETAL SYSTEM: ICD-10-CM

## 2024-12-12 DIAGNOSIS — M65.949 TENOSYNOVITIS OF FINGER AND HAND: Primary | ICD-10-CM

## 2024-12-12 PROCEDURE — 97140 MANUAL THERAPY 1/> REGIONS: CPT

## 2024-12-12 PROCEDURE — 97110 THERAPEUTIC EXERCISES: CPT

## 2024-12-12 NOTE — PROGRESS NOTES
"Daily Note     Today's date: 2024  Patient name: Yoni Castillo  : 1965  MRN: [id number]  Referring provider: Yesenia Carrasquillo PA-C  Dx:   Encounter Diagnosis     ICD-10-CM    1. Tenosynovitis of finger and hand  M65.949       2. Animal bite of left hand with infection, subsequent encounter  S61.452D     L08.9       3. Aftercare following surgery of the musculoskeletal system  Z47.89           Start Time: 1435  Stop Time: 1530  Total time in clinic (min): 55 minutes    Subjective: \"I had extreme pain after last visit. I couldn't use my R arm for 24 hours.\"      Objective: See treatment diary below      Assessment: Tolerated treatment well. Pt reports his soreness from Monday has improved. He continues with significant stiffness, minimal improvement from last session.       Plan: Continue per plan of care.      Precautions: right hand and wrist s/p I&D 10/3   Manuals  24          Edema mob  5'          Scar tissue mob 5' 10'          STM/IASTM  IASTM intrinsics 5' STM 5'          Cupping  dorsal scar 5'                       Ther Ex                 Scap squeeze             Shoulder ROM            Elbow ROM           Forearm AROM/PROM           Wrist AROM/PROM 5' 2x20 AROM    5' PROM    5' weighted stretch in extension and flexion with 3lb dumbbell         Digit AROM/PROM 12' 2x20    5' PROM         Thumb AROM/PROM  X20 AROM    2' PROM         Digit abduction  x20         Intrinsic stretch 3x10 2x30s each finger         Wrist maze  x10         Graded strength                                     Ther Activity             Grasp/release            Dexterity           Mari's Carry PRN           Small tool use/small part activity                                              Neuro Re-Ed                                               Ortho Fit            Static progressive splinting for wrist/finger flexion/extension                                   Modalities            HP  5' 5'                    " Spoke to patient who understood the message and will try to change his diet. Remind Me for labs before OV on 8/19/ba

## 2024-12-16 ENCOUNTER — OFFICE VISIT (OUTPATIENT)
Dept: OCCUPATIONAL THERAPY | Age: 59
End: 2024-12-16
Payer: MEDICARE

## 2024-12-16 DIAGNOSIS — Z47.89 AFTERCARE FOLLOWING SURGERY OF THE MUSCULOSKELETAL SYSTEM: ICD-10-CM

## 2024-12-16 DIAGNOSIS — M65.949 TENOSYNOVITIS OF FINGER AND HAND: Primary | ICD-10-CM

## 2024-12-16 DIAGNOSIS — L08.9 ANIMAL BITE OF LEFT HAND WITH INFECTION, SUBSEQUENT ENCOUNTER: ICD-10-CM

## 2024-12-16 DIAGNOSIS — S61.452D ANIMAL BITE OF LEFT HAND WITH INFECTION, SUBSEQUENT ENCOUNTER: ICD-10-CM

## 2024-12-16 PROCEDURE — 97110 THERAPEUTIC EXERCISES: CPT

## 2024-12-16 PROCEDURE — 97140 MANUAL THERAPY 1/> REGIONS: CPT

## 2024-12-16 NOTE — PROGRESS NOTES
"Daily Note     Today's date: 2024  Patient name: Yoni Castillo  : 1965  MRN: [id number]  Referring provider: Yesenia Carrasquillo PA-C  Dx:   Encounter Diagnosis     ICD-10-CM    1. Tenosynovitis of finger and hand  M65.949       2. Animal bite of left hand with infection, subsequent encounter  S61.452D     L08.9       3. Aftercare following surgery of the musculoskeletal system  Z47.89           Start Time: 1442  Stop Time: 1530  Total time in clinic (min): 48 minutes    Subjective: \"I caught a 45 lb cooler over the weekend and I was sore for a few days.\"      Objective: See treatment diary below    Wrist AROM (compared to eval)    Right   Flexion 44 (was 15)   Extension 43 (was 27)       Assessment: Tolerated treatment well. Pt reports a flare of pain from cooking and using his hand over the weekend however this has resolved prior to therapy. Demo increased AROM compared to evaluation. Pt notices good benefit from ball stretch.       Plan: Continue per plan of care.      Precautions: right hand and wrist s/p I&D 10/3   Manuals  24         Edema mob  5'          Scar tissue mob 5' 10' 10'         STM/IASTM  IASTM intrinsics 5' STM 5' 5'         Cupping  dorsal scar 5'                       Ther Ex                 Scap squeeze             Shoulder ROM            Elbow ROM           Forearm AROM/PROM           Wrist AROM/PROM 5' 2x20 AROM    5' PROM    5' weighted stretch in extension and flexion with 3lb dumbbell 2x20 AROM    5' PROM    5' weighted stretch in extension and flexion with 3lb dumbbell        Digit AROM/PROM 12' 2x20    5' PROM 2x20    5' PROM        Thumb AROM/PROM  X20 AROM    2' PROM X20 AROM    2' PROM        Digit abduction  x20 x20        Intrinsic stretch 3x10 2x30s each finger 2x30s each finger        Wrist maze  x10 Ball roll with stretch.         Graded strength                                     Ther Activity             Grasp/release            Dexterity         "   Farmer's Carry PRN           Small tool use/small part activity                                              Neuro Re-Ed                                               Ortho Fit            Static progressive splinting for wrist/finger flexion/extension                                   Modalities            HP  5' 5' 5'

## 2024-12-17 DIAGNOSIS — M54.40 LOW BACK PAIN WITH SCIATICA, SCIATICA LATERALITY UNSPECIFIED, UNSPECIFIED BACK PAIN LATERALITY, UNSPECIFIED CHRONICITY: ICD-10-CM

## 2024-12-17 DIAGNOSIS — M79.605 LEFT LEG PAIN: ICD-10-CM

## 2024-12-18 ENCOUNTER — PREP FOR PROCEDURE (OUTPATIENT)
Dept: UROLOGY | Facility: CLINIC | Age: 59
End: 2024-12-18

## 2024-12-18 ENCOUNTER — TELEPHONE (OUTPATIENT)
Dept: UROLOGY | Facility: CLINIC | Age: 59
End: 2024-12-18

## 2024-12-18 DIAGNOSIS — R39.89 SUSPECTED UTI: ICD-10-CM

## 2024-12-18 DIAGNOSIS — Z01.810 PRE-OPERATIVE CARDIOVASCULAR EXAMINATION: Primary | ICD-10-CM

## 2024-12-18 DIAGNOSIS — E11.22 TYPE 2 DIABETES MELLITUS WITH CHRONIC KIDNEY DISEASE, WITHOUT LONG-TERM CURRENT USE OF INSULIN, UNSPECIFIED CKD STAGE (HCC): ICD-10-CM

## 2024-12-18 DIAGNOSIS — N20.0 RIGHT RENAL STONE: ICD-10-CM

## 2024-12-18 DIAGNOSIS — Z01.818 OTHER SPECIFIED PRE-OPERATIVE EXAMINATION: ICD-10-CM

## 2024-12-18 DIAGNOSIS — Z01.812 PRE-OPERATIVE LABORATORY EXAMINATION: ICD-10-CM

## 2024-12-18 RX ORDER — TRAMADOL HYDROCHLORIDE 50 MG/1
100 TABLET ORAL
Qty: 60 TABLET | Refills: 0 | Status: SHIPPED | OUTPATIENT
Start: 2024-12-18

## 2024-12-18 RX ORDER — LIDOCAINE 50 MG/G
1 PATCH TOPICAL DAILY
Qty: 30 PATCH | Refills: 0 | OUTPATIENT
Start: 2024-12-18

## 2024-12-18 NOTE — TELEPHONE ENCOUNTER
Spoke with patient and confirmed surgery date of 03/03/25  Type of surgery:PCNL  Operating physician:Dr. Hamilton  Location of surgery: [facility]    Verbally went over prep with patient on   NPO  Bowel prep? No  Hospital calls afternoon prior with arrival time (calls Friday afternoon for Monday surgery)  Patient needs ride to and from surgery   outpatient with a 23 hour hold   Pre-op testing to be done 2 weeks prior to surgery. All testing can be done as a walk-in. EKG can only be done as a walk-in at any [facility].  CBC BMP A1C T/S U/C EKG  Blood thinners:   None  Clearances needed: Cardiac    Mailedto patient on   Copy of packet scanned into Media  Labs in packet and in electronic record   Soap prep in packet  post-op in packet     Consent: in media     Cardiac Clearance:  Appt with:  Appt date and time:  Date clearance form faxed:  Best fax number:    Medication Suspension of: None  Ordering provider:  Faxed Medication Suspension form on:  Best fax number:

## 2024-12-18 NOTE — TELEPHONE ENCOUNTER
----- Message -----   From: Adis Hamilton MD   Sent: 11/21/2024   8:55 AM EST   To: Savannah Loja MA     Please schedule for right PCNL.     He should be done at [place] in the hybrid room with help from IR to get access. He is not an urgent case and can be done whenever.     He needs a cardiology evaluation prior and a urine culture closer to his surgical date.

## 2024-12-22 DIAGNOSIS — M51.369 DDD (DEGENERATIVE DISC DISEASE), LUMBAR: ICD-10-CM

## 2024-12-23 ENCOUNTER — OFFICE VISIT (OUTPATIENT)
Dept: OCCUPATIONAL THERAPY | Age: 59
End: 2024-12-23
Payer: MEDICARE

## 2024-12-23 DIAGNOSIS — L08.9 ANIMAL BITE OF LEFT HAND WITH INFECTION, SUBSEQUENT ENCOUNTER: ICD-10-CM

## 2024-12-23 DIAGNOSIS — S61.452D ANIMAL BITE OF LEFT HAND WITH INFECTION, SUBSEQUENT ENCOUNTER: ICD-10-CM

## 2024-12-23 DIAGNOSIS — M65.949 TENOSYNOVITIS OF FINGER AND HAND: Primary | ICD-10-CM

## 2024-12-23 DIAGNOSIS — Z47.89 AFTERCARE FOLLOWING SURGERY OF THE MUSCULOSKELETAL SYSTEM: ICD-10-CM

## 2024-12-23 PROCEDURE — 97140 MANUAL THERAPY 1/> REGIONS: CPT

## 2024-12-23 PROCEDURE — 97110 THERAPEUTIC EXERCISES: CPT

## 2024-12-23 RX ORDER — METHOCARBAMOL 750 MG/1
1500 TABLET, FILM COATED ORAL EVERY 8 HOURS SCHEDULED
Qty: 30 TABLET | Refills: 0 | Status: SHIPPED | OUTPATIENT
Start: 2024-12-23

## 2024-12-23 NOTE — PROGRESS NOTES
"OT Re-evaluation     Today's date: 2024  Patient name: Yoni Castillo  : 1965  MRN: [id number]  Referring provider: Yesenia Carrasquillo PA-C  Dx:   Encounter Diagnosis     ICD-10-CM    1. Tenosynovitis of finger and hand  M65.949       2. Animal bite of left hand with infection, subsequent encounter  S61.452D     L08.9       3. Aftercare following surgery of the musculoskeletal system  Z47.89           Start Time: 1445  Stop Time: 1530  Total time in clinic (min): 45 minutes    Subjective: \"My wife fell down the stairs and hit my hand. I try to use it for daily tasks. It's hard to pull my pants up.\"      Objective: See treatment diary below    Wrist flexion: 44  Wrist extension: 46    Hand AROM: unable to make a fist. 1/2 composite fist. Thumb AROM is WFL.     R Hand (ext/flex)    D2 D3 D4 D5   MCP 50 50 50 50   PIP 70 70 75 80   DIP 25 25 25 25         Assessment: Tolerated treatment well. Pt had some soreness at the start of today's session from his hand getting hit by his wife during a fall. Stiffness  and thickened scar tissue remains however pt able to maintain improvements from last session. He is able to use his hand for many daily tasks however he has significant range of motion deficits and weakness due to inability to  objects. He reports difficulty pulling his pants up. He would benefit from continued OT 1-2x/week for 5 weeks to continue softening his scar and progress strengthening and AROM. Pt understands/agrees with current POC.    Goals  Short term goals:  To be achieved within 3-4 visits from start of care on 24.   Patient will be independent with donning/doffing orthotic and report compliance with recommended wear and care instructions.    Patient will demonstrate independence with scar mobilization techniques and post-op wound care instructions.   Patient will demonstrate independence with all AROM and stretching HEPs.  Patient will verbalize understanding of activity limitations " within post-op precautions for improved symptom management.   Patient will verbalize understanding of activity modifications to maximize functional use of R hand throughout normal routine.   Patient will tolerate therapeutic exercises/activities with reports of pain <3/10.    ALL STGs MET        Long term goals:  To be achieved at time of discharge:   Patient will demonstrate improved AROM to WFL compared to uninvolved side. PARTIALLY MET  Patient will begin to report decreased pain with completion of ADLs (donning shoes/dressing, etc.). PARTIALLY MET  Patient will begin to report improved completion of IADLs (cooking, washing dishes, cleaning, etc.) with implementation of recommended symptom management strategies. PARTIALLY MET  Patient will begin to report improved participation/engagement in desired leisure occupations (model cars). PARTIALLY MET  Patient will demonstrate improvements with  and pinch strength to within 25% of their uninvolved side for increased readiness to return to heavier household chores.  NOT MET  Patient will report readiness for discharge from OT services.NOT MET      Plan: Continue per plan of care.     Planned modality interventions: thermotherapy: hydrocollator packs and ultrasound  Other planned modality interventions: IASTM     Planned therapy interventions: IADL retraining, manual therapy, activity modification, fine motor coordination training, flexibility, functional ROM exercises, home exercise program, graded exercise, graded activity, therapeutic exercise, therapeutic activities, stretching, strengthening, patient education, orthotic fitting/training, joint mobilization, orthotic management and training, work reintegration, dressing changes, balance/weight bearing training and ADL retraining     Frequency: 2x week  Duration in weeks: 6  Plan of Care beginning date: 12/23/2024  Plan of Care expiration date: 4/28/2024  Treatment plan discussed with: patient     Precautions:  right hand and wrist s/p I&D 10/3   Re-eval 12/23    Manuals  12/9/24 12/12 12/16 12/23 12th visit re-eval        Edema mob  5'          Scar tissue mob 5' 10' 10' 10'        STM/IASTM  IASTM intrinsics 5' STM 5' 5'         Cupping  dorsal scar 5'                       Ther Ex                 Scap squeeze             Shoulder ROM            Elbow ROM           Forearm AROM/PROM           Wrist AROM/PROM 5' 2x20 AROM    5' PROM    5' weighted stretch in extension and flexion with 3lb dumbbell 2x20 AROM    5' PROM    5' weighted stretch in extension and flexion with 3lb dumbbell 2x20 AROM    5' PROM    5' weighted stretch in extension and flexion with 3lb dumbbell       Digit AROM/PROM 12' 2x20    5' PROM 2x20    5' PROM 2x20    5' PROM       Thumb AROM/PROM  X20 AROM    2' PROM X20 AROM    2' PROM X20 AROM    2' PROM       Digit abduction  x20 x20 x20       Intrinsic stretch 3x10 2x30s each finger 2x30s each finger 2x30s each finger       Wrist maze  x10 Ball roll with stretch.  Ball roll with stretch.        Graded strength                                     Ther Activity             Grasp/release            Dexterity           Mari's Carry PRN           Small tool use/small part activity                                              Neuro Re-Ed                                               Ortho Fit            Static progressive splinting for wrist/finger flexion/extension                                   Modalities            HP  5' 5' 5' 5'

## 2024-12-26 ENCOUNTER — APPOINTMENT (OUTPATIENT)
Dept: OCCUPATIONAL THERAPY | Age: 59
End: 2024-12-26
Payer: MEDICARE

## 2024-12-27 ENCOUNTER — PREP FOR PROCEDURE (OUTPATIENT)
Dept: OBGYN CLINIC | Facility: MEDICAL CENTER | Age: 59
End: 2024-12-27

## 2024-12-27 ENCOUNTER — OFFICE VISIT (OUTPATIENT)
Dept: OBGYN CLINIC | Facility: CLINIC | Age: 59
End: 2024-12-27

## 2024-12-27 DIAGNOSIS — L08.9: ICD-10-CM

## 2024-12-27 DIAGNOSIS — M25.641 STIFFNESS OF FINGER JOINT OF RIGHT HAND: ICD-10-CM

## 2024-12-27 DIAGNOSIS — M25.631 WRIST STIFFNESS, RIGHT: ICD-10-CM

## 2024-12-27 DIAGNOSIS — S61.452A DOG BITE OF LEFT HAND, INITIAL ENCOUNTER: ICD-10-CM

## 2024-12-27 DIAGNOSIS — S61.551S: ICD-10-CM
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
## 2024-12-27 DIAGNOSIS — M65.949 TENOSYNOVITIS OF FINGER AND HAND: Primary | ICD-10-CM
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
## 2024-12-27 PROCEDURE — 99024 POSTOP FOLLOW-UP VISIT: CPT | Performed by: ORTHOPAEDIC SURGERY

## 2024-12-27 RX ORDER — ACETAMINOPHEN 325 MG/1
975 TABLET ORAL ONCE
OUTPATIENT
Start: 2024-12-27 | End: 2024-12-27

## 2024-12-27 NOTE — PROGRESS NOTES
HAND & UPPER EXTREMITY OFFICE VISIT   Referred By:  No referring provider defined for this encounter.      Chief Complaint:     Right wrist pain    Surgery:  10/3/2024 - Irrigation and debridement, right wrist, volar and dorsal, possible extensor and flexor tenosynovectomy, any indicated procedures - Right     9/17/24:  Irrigation and debridement right wrist and hand.  Right  2nd, 3rd and 4th dorsal extensor compartment tenosynovectomy.     History of Present Illness:   Patient presents now 12 weeks post op. Overall he states he is still having pain with any activity.  His dorsal hand and wrist are still swollen and he has pain and stiffness making a fist. Pain and loss of motion of his hand are causing functional issues for him.   He has been attending formal therapy and feels he has made some progress overall with use of his hand but he is also having more pain now. He stated the pain can radiate up his dorsal hand and wrist as well as some radial wrist pain now.   He does state he had a recent injury to his Right hand when his wife fell down the steps a few days ago and hit the hand. This was documented by the therapist  He denies numbness    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    FOOT SURGERY  2022    Left Foot    GANGLION CYST EXCISION Left 03/25/2024    Procedure: EXCISION MUCOID CYST, INDEX FINGER DIP;  Surgeon: Carroll Mccarthy MD;  Location: [facility];  Service: Orthopedics    GANGLION CYST EXCISION Right 05/20/2024    Procedure: EXCISION MUCOID CYST - RIGHT INDEX AND MIDDLE FINGERS;  Surgeon: Carroll Mccarthy MD;  [facility];  Service:  Orthopedics    HERNIA REPAIR      INCISION AND DRAINAGE  01/16/2024    KIDNEY SURGERY      KNEE SURGERY  1980    MOUTH SURGERY      OK AMPUTATION METATARSAL W/TOE SINGLE Left 6/1/2022    Procedure: RAY RESECTION FOOT;  Surgeon: Hannah Melgoza DPM;  Location[facility];  Service: Podiatry    OK INCISION & DRAINAGE ABSCESS COMPLICATED/MULTIPLE Right 09/17/2024    Procedure: Irrigation and debridement right wrist and hand, any indicated procedures;  Surgeon: Carroll Mccarthy MD;  [facility];  Service: Orthopedics    OK RPR AA HERNIA 1ST < 3 CM REDUCIBLE N/A 7/14/2023    Procedure: REPAIR HERNIA INCISIONAL;  Surgeon: Matt Melo MD;  [facility];  Service: General    OK SYNOVECTOMY EXTENSOR TENDON SHTH WRIST 1 CMPRT Right 10/03/2024    Procedure: Irrigation and debridement, right wrist, volar and dorsal, possible extensor and flexor tenosynovectomy, any indicated procedures;  Surgeon: Carroll Mccarthy MD;  [facility];  Service: Orthopedics    TONSILLECTOMY  1968    Yes    WOUND DEBRIDEMENT Left 4/28/2022    Procedure: Left foot washout;  Surgeon: Hannah Melgoza DPM;  [facility];  Service: Podiatry    WOUND DEBRIDEMENT Left 6/6/2022    Procedure: DEBRIDEMENT WOUND (WASH OUT);  Surgeon: Hannah Melgoza DPM;  [facility];  Service: Podiatry     Family History   Problem Relation Age of Onset    Diabetes Paternal Grandmother     Diabetes Paternal Grandfather     Arthritis Maternal Grandmother      Social History     Socioeconomic History    Marital status: Registered Domestic Partner     Spouse name: Not on file    Number of children: Not on file    Years of education: Not on file    Highest education level: Not on file   Occupational History    Not on file   Tobacco Use    Smoking status: Never    Smokeless tobacco: Never   Vaping Use    Vaping status: Never Used   Substance and Sexual Activity    Alcohol use: Yes     Alcohol/week: 1.0 standard drink of  alcohol     Types: 1 Cans of beer per week     Comment: ocassional    Drug use: Never    Sexual activity: Not Currently     Partners: Female     Birth control/protection: Abstinence   Other Topics Concern    Not on file   Social History Narrative    Not on file     Social Drivers of Health     Financial Resource Strain: Not on file   Food Insecurity: Patient Declined (11/13/2024)    Hunger Vital Sign     Worried About Running Out of Food in the Last Year: Patient declined     Ran Out of Food in the Last Year: Patient declined   Transportation Needs: Patient Declined (11/13/2024)    PRAPARE - Transportation     Lack of Transportation (Medical): Patient declined     Lack of Transportation (Non-Medical): Patient declined   Physical Activity: Not on file   Stress: Not on file   Social Connections: Not on file   Intimate Partner Violence: Not on file   Housing Stability: Patient Declined (11/13/2024)    Housing Stability Vital Sign     Unable to Pay for Housing in the Last Year: Patient declined     Number of Times Moved in the Last Year: 0     Homeless in the Last Year: Patient declined     Scheduled Meds:  Continuous Infusions:No current facility-administered medications for this visit.    PRN Meds:.  Allergies   Allergen Reactions    Cephalexin Hives     Skin peeling  Tolerates penicillins (tolerated unasyn and zosyn)    Pollen Extract Sneezing    Metformin GI Intolerance       Physical Examination:    There were no vitals taken for this visit.    Gen: A&Ox3, NAD    Right Upper Extremity:  Dorsal incision well healed.    tenderness over the 1st dorsal compartment with positive Finkelstein's  Sensation intact to light touch in the axillary median, ulnar, and radial nerve distributions  Approximately 50% composite fist with pain  Minimal wrist range of motion with approximately 20 degrees flexion and 5 degrees extension  Full pronation  Approximately 45 deg supination        Left Upper Extremity:  superficial puncture  wounds over dorsal hand healing well  no purulence or fluctuance, no visible or expressible drainage  Sensation intact to light touch in the axillary median, ulnar, and radial nerve distributions  Full digital and wrist active ROM  Warm, well-perfused digits    Studies:    No new images obtained/reviewed        10/3/24: One culture sample growing Staph epidermidis in broth only -susceptible to the majority of antibiotics except Bactrim and penicillin.  Anaerobic Culture No anaerobes isolated      Growth in Broth culture only Staphylococcus epidermidis Abnormal            Susceptibility     Staphylococcus epidermidis     LEDA (Preliminary)     Cefazolin ($) <=8.00 ug/ml Susceptible     Clindamycin ($) <=0.25 ug/ml Susceptible     Erythromycin ($$$$) <=0.25 ug/ml Susceptible     Gentamicin ($$) <=4 ug/ml Susceptible     Oxacillin <=0.25 ug/ml Susceptible     Penicillin 0.500 ug/ml Resistant     Tetracycline <=4 ug/ml Susceptible     Trimethoprim + Sulfamethoxazole ($$$) >2/38 ug/ml Resistant     Vancomycin ($) 1.00 ug/ml Susceptible                         Assessment and Plan:  1. Tenosynovitis of finger and hand  Case request operating room: RELEASE DEQUERVAINS - Right, Right wrist and hand extensor tenolysis and wrist capsulotomy, possible metacarpophalangeal joint capsulotomy, any indicated procedures    Case request operating room: RELEASE DEQUERVAINS - Right, Right wrist and hand extensor tenolysis and wrist capsulotomy, possible metacarpophalangeal joint capsulotomy, any indicated procedures      2. Dog bite of right wrist with infection, sequela        3. Dog bite of left hand, initial encounter        4. Stiffness of finger joint of right hand  Case request operating room: RELEASE DEQUERVAINS - Right, Right wrist and hand extensor tenolysis and wrist capsulotomy, possible metacarpophalangeal joint capsulotomy, any indicated procedures    Case request operating room: RELEASE DEQUERVAINS - Right, Right wrist and  hand extensor tenolysis and wrist capsulotomy, possible metacarpophalangeal joint capsulotomy, any indicated procedures      5. Wrist stiffness, right  Case request operating room: RELEASE DEQUERVAINS - Right, Right wrist and hand extensor tenolysis and wrist capsulotomy, possible metacarpophalangeal joint capsulotomy, any indicated procedures    Case request operating room: RELEASE DEQUERVAINS - Right, Right wrist and hand extensor tenolysis and wrist capsulotomy, possible metacarpophalangeal joint capsulotomy, any indicated procedures                59 y.o. male presents 3 months status post Irrigation and debridement, right wrist, volar and dorsal, possible extensor and flexor tenosynovectomy, any indicated procedures - Right.   We discussed that stiffness and swelling are his main limiting factors.  We discussed treatment options at this point include continued therapy with her about additional dynamic bracing versus surgical intervention.  The surgical option would be to release scar tissue as well as a 1st dorsal compartment release and then perform manipulation under anesthesia     Due to his diabetes and based on our previous discussions, I do not recommend corticosteroid injections.     After a shared decision-making process and thorough discussion, and elected to proceed with surgical intervention for a right wrist extensor tenolysis, dorsal radiocarpal capsulotomy and possible MP joint capsulotomies followed by manipulation, in addition to a de Quervain's release.  We discussed that although he may have some flexor adhesions preventing active flexion of the fingers we will take this in a stepwise fashion and released more proximally first to see how his symptoms improve.  If he continues to have issues we can discuss later surgical releases on the volar side of the wrist and palm/fingers.  He is amenable to this plan.      We reviewed the risk of surgery including but not limited to infection, bleeding,  injury to nearby structures including the nerve, poor wound healing, continued pain and stiffness, CRPS, and incomplete resolution or recurrence of symptoms. We reviewed the details of the procedure and the anticipated recovery period. All questions answered to patient's satisfaction. Written consent obtained in the office today.      Right wrist and hand extensor tenolysis and wrist capsulotomy, possible metacarpophalangeal joint capsulotomy, any indicated procedures, and right de Quervain's release  Conscious sedation    It is recommended he return to the office post- operatively   he expressed understanding of the plan and agreed. We encouraged them to contact our office with any questions or concerns.         Carroll Mccarthy MD  Hand and Upper Extremity Surgery          *This note was dictated using Dragon voice recognition software. Please excuse any word substitutions or errors.*      Scribe Attestation      I,:  Maurice Rodriguez am acting as a scribe while in the presence of the attending physician.:       I,:  Carroll Mccarthy MD personally performed the services described in this documentation    as scribed in my presence.:

## 2024-12-28 NOTE — H&P
HAND & UPPER EXTREMITY OFFICE VISIT   Referred By:  No referring provider defined for this encounter.      Chief Complaint:     Right wrist pain    Surgery:  10/3/2024 - Irrigation and debridement, right wrist, volar and dorsal, possible extensor and flexor tenosynovectomy, any indicated procedures - Right     9/17/24:  Irrigation and debridement right wrist and hand.  Right  2nd, 3rd and 4th dorsal extensor compartment tenosynovectomy.     History of Present Illness:   Patient presents now 12 weeks post op. Overall he states he is still having pain with any activity.  His dorsal hand and wrist are still swollen and he has pain and stiffness making a fist. Pain and loss of motion of his hand are causing functional issues for him.   He has been attending formal therapy and feels he has made some progress overall with use of his hand but he is also having more pain now. He stated the pain can radiate up his dorsal hand and wrist as well as some radial wrist pain now.   He does state he had a recent injury to his Right hand when his wife fell down the steps a few days ago and hit the hand. This was documented by the therapist  He denies numbness    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    FOOT SURGERY  2022    Left Foot    GANGLION CYST EXCISION Left 03/25/2024    Procedure: EXCISION MUCOID CYST, INDEX FINGER DIP;  Surgeon: Carroll Mccarthy MD;  [facility];  Service: Orthopedics    GANGLION CYST EXCISION Right 05/20/2024    Procedure: EXCISION MUCOID CYST - RIGHT INDEX AND MIDDLE FINGERS;  Surgeon: Carroll Mccarthy MD;  [facility];  Service:  Orthopedics    HERNIA REPAIR      INCISION AND DRAINAGE  01/16/2024    KIDNEY SURGERY      KNEE SURGERY  1980    MOUTH SURGERY      MS AMPUTATION METATARSAL W/TOE SINGLE Left 6/1/2022    Procedure: RAY RESECTION FOOT;  Surgeon: Hannah Melgoza DPM;  [facility];  Service: Podiatry    MS INCISION & DRAINAGE ABSCESS COMPLICATED/MULTIPLE Right 09/17/2024    Procedure: Irrigation and debridement right wrist and hand, any indicated procedures;  Surgeon: Carroll Mccarthy MD;  [facility];  Service: Orthopedics    MS RPR AA HERNIA 1ST < 3 CM REDUCIBLE N/A 7/14/2023    Procedure: REPAIR HERNIA INCISIONAL;  Surgeon: Matt Melo MD;  [facility];  Service: General    MS SYNOVECTOMY EXTENSOR TENDON SHTH WRIST 1 CMPRT Right 10/03/2024    Procedure: Irrigation and debridement, right wrist, volar and dorsal, possible extensor and flexor tenosynovectomy, any indicated procedures;  Surgeon: Carroll Mccarthy MD;  [facility];  Service: Orthopedics    TONSILLECTOMY  1968    Yes    WOUND DEBRIDEMENT Left 4/28/2022    Procedure: Left foot washout;  Surgeon: Hannah Melgoza DPM;  [facility];  Service: Podiatry    WOUND DEBRIDEMENT Left 6/6/2022    Procedure: DEBRIDEMENT WOUND (WASH OUT);  Surgeon: Hannah Melgoza DPM;  [facility];  Service: Podiatry     Family History   Problem Relation Age of Onset    Diabetes Paternal Grandmother     Diabetes Paternal Grandfather     Arthritis Maternal Grandmother      Social History     Socioeconomic History    Marital status: Registered Domestic Partner     Spouse name: Not on file    Number of children: Not on file    Years of education: Not on file    Highest education level: Not on file   Occupational History    Not on file   Tobacco Use    Smoking status: Never    Smokeless tobacco: Never   Vaping Use    Vaping status: Never Used   Substance and Sexual Activity    Alcohol use: Yes     Alcohol/week: 1.0 standard drink of  alcohol     Types: 1 Cans of beer per week     Comment: ocassional    Drug use: Never    Sexual activity: Not Currently     Partners: Female     Birth control/protection: Abstinence   Other Topics Concern    Not on file   Social History Narrative    Not on file     Social Drivers of Health     Financial Resource Strain: Not on file   Food Insecurity: Patient Declined (11/13/2024)    Hunger Vital Sign     Worried About Running Out of Food in the Last Year: Patient declined     Ran Out of Food in the Last Year: Patient declined   Transportation Needs: Patient Declined (11/13/2024)    PRAPARE - Transportation     Lack of Transportation (Medical): Patient declined     Lack of Transportation (Non-Medical): Patient declined   Physical Activity: Not on file   Stress: Not on file   Social Connections: Not on file   Intimate Partner Violence: Not on file   Housing Stability: Patient Declined (11/13/2024)    Housing Stability Vital Sign     Unable to Pay for Housing in the Last Year: Patient declined     Number of Times Moved in the Last Year: 0     Homeless in the Last Year: Patient declined     Scheduled Meds:  Continuous Infusions:No current facility-administered medications for this visit.    PRN Meds:.  Allergies   Allergen Reactions    Cephalexin Hives     Skin peeling  Tolerates penicillins (tolerated unasyn and zosyn)    Pollen Extract Sneezing    Metformin GI Intolerance       Physical Examination:    There were no vitals taken for this visit.    Gen: A&Ox3, NAD    Right Upper Extremity:  Dorsal incision well healed.    tenderness over the 1st dorsal compartment with positive Finkelstein's  Sensation intact to light touch in the axillary median, ulnar, and radial nerve distributions  Approximately 50% composite fist with pain  Minimal wrist range of motion with approximately 20 degrees flexion and 5 degrees extension  Full pronation  Approximately 45 deg supination        Left Upper Extremity:  superficial puncture  wounds over dorsal hand healing well  no purulence or fluctuance, no visible or expressible drainage  Sensation intact to light touch in the axillary median, ulnar, and radial nerve distributions  Full digital and wrist active ROM  Warm, well-perfused digits    Studies:    No new images obtained/reviewed        10/3/24: One culture sample growing Staph epidermidis in broth only -susceptible to the majority of antibiotics except Bactrim and penicillin.  Anaerobic Culture No anaerobes isolated      Growth in Broth culture only Staphylococcus epidermidis Abnormal            Susceptibility     Staphylococcus epidermidis     LEDA (Preliminary)     Cefazolin ($) <=8.00 ug/ml Susceptible     Clindamycin ($) <=0.25 ug/ml Susceptible     Erythromycin ($$$$) <=0.25 ug/ml Susceptible     Gentamicin ($$) <=4 ug/ml Susceptible     Oxacillin <=0.25 ug/ml Susceptible     Penicillin 0.500 ug/ml Resistant     Tetracycline <=4 ug/ml Susceptible     Trimethoprim + Sulfamethoxazole ($$$) >2/38 ug/ml Resistant     Vancomycin ($) 1.00 ug/ml Susceptible                         Assessment and Plan:  1. Tenosynovitis of finger and hand  Case request operating room: RELEASE DEQUERVAINS - Right, Right wrist and hand extensor tenolysis and wrist capsulotomy, possible metacarpophalangeal joint capsulotomy, any indicated procedures    Case request operating room: RELEASE DEQUERVAINS - Right, Right wrist and hand extensor tenolysis and wrist capsulotomy, possible metacarpophalangeal joint capsulotomy, any indicated procedures      2. Dog bite of right wrist with infection, sequela        3. Dog bite of left hand, initial encounter        4. Stiffness of finger joint of right hand  Case request operating room: RELEASE DEQUERVAINS - Right, Right wrist and hand extensor tenolysis and wrist capsulotomy, possible metacarpophalangeal joint capsulotomy, any indicated procedures    Case request operating room: RELEASE DEQUERVAINS - Right, Right wrist and  hand extensor tenolysis and wrist capsulotomy, possible metacarpophalangeal joint capsulotomy, any indicated procedures      5. Wrist stiffness, right  Case request operating room: RELEASE DEQUERVAINS - Right, Right wrist and hand extensor tenolysis and wrist capsulotomy, possible metacarpophalangeal joint capsulotomy, any indicated procedures    Case request operating room: RELEASE DEQUERVAINS - Right, Right wrist and hand extensor tenolysis and wrist capsulotomy, possible metacarpophalangeal joint capsulotomy, any indicated procedures                59 y.o. male presents 3 months status post Irrigation and debridement, right wrist, volar and dorsal, possible extensor and flexor tenosynovectomy, any indicated procedures - Right.   We discussed that stiffness and swelling are his main limiting factors.  We discussed treatment options at this point include continued therapy with her about additional dynamic bracing versus surgical intervention.  The surgical option would be to release scar tissue as well as a 1st dorsal compartment release and then perform manipulation under anesthesia     Due to his diabetes and based on our previous discussions, I do not recommend corticosteroid injections.     After a shared decision-making process and thorough discussion, and elected to proceed with surgical intervention for a right wrist extensor tenolysis, dorsal radiocarpal capsulotomy and possible MP joint capsulotomies followed by manipulation, in addition to a de Quervain's release.  We discussed that although he may have some flexor adhesions preventing active flexion of the fingers we will take this in a stepwise fashion and released more proximally first to see how his symptoms improve.  If he continues to have issues we can discuss later surgical releases on the volar side of the wrist and palm/fingers.  He is amenable to this plan.      We reviewed the risk of surgery including but not limited to infection, bleeding,  injury to nearby structures including the nerve, poor wound healing, continued pain and stiffness, CRPS, and incomplete resolution or recurrence of symptoms. We reviewed the details of the procedure and the anticipated recovery period. All questions answered to patient's satisfaction. Written consent obtained in the office today.      Right wrist and hand extensor tenolysis and wrist capsulotomy, possible metacarpophalangeal joint capsulotomy, any indicated procedures, and right de Quervain's release  Conscious sedation    It is recommended he return to the office post- operatively   he expressed understanding of the plan and agreed. We encouraged them to contact our office with any questions or concerns.         Carroll Mccarthy MD  Hand and Upper Extremity Surgery          *This note was dictated using Dragon voice recognition software. Please excuse any word substitutions or errors.*      Scribe Attestation      I,:  Maurice Rodriguez am acting as a scribe while in the presence of the attending physician.:       I,:  Carroll Mccarthy MD personally performed the services described in this documentation    as scribed in my presence.:

## 2024-12-28 NOTE — H&P (VIEW-ONLY)
HAND & UPPER EXTREMITY OFFICE VISIT   Referred By:  No referring provider defined for this encounter.      Chief Complaint:     Right wrist pain    Surgery:  10/3/2024 - Irrigation and debridement, right wrist, volar and dorsal, possible extensor and flexor tenosynovectomy, any indicated procedures - Right     9/17/24:  Irrigation and debridement right wrist and hand.  Right  2nd, 3rd and 4th dorsal extensor compartment tenosynovectomy.     History of Present Illness:   Patient presents now 12 weeks post op. Overall he states he is still having pain with any activity.  His dorsal hand and wrist are still swollen and he has pain and stiffness making a fist. Pain and loss of motion of his hand are causing functional issues for him.   He has been attending formal therapy and feels he has made some progress overall with use of his hand but he is also having more pain now. He stated the pain can radiate up his dorsal hand and wrist as well as some radial wrist pain now.   He does state he had a recent injury to his Right hand when his wife fell down the steps a few days ago and hit the hand. This was documented by the therapist  He denies numbness    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    FOOT SURGERY  2022    Left Foot    GANGLION CYST EXCISION Left 03/25/2024    Procedure: EXCISION MUCOID CYST, INDEX FINGER DIP;  Surgeon: Carroll Mccarthy MD;  [facility];  Service: Orthopedics    GANGLION CYST EXCISION Right 05/20/2024    Procedure: EXCISION MUCOID CYST - RIGHT INDEX AND MIDDLE FINGERS;  Surgeon: Carroll Mccarthy MD;  [facility];  Service:  Orthopedics    HERNIA REPAIR      INCISION AND DRAINAGE  01/16/2024    KIDNEY SURGERY      KNEE SURGERY  1980    MOUTH SURGERY      SD AMPUTATION METATARSAL W/TOE SINGLE Left 6/1/2022    Procedure: RAY RESECTION FOOT;  Surgeon: Hannah Melgoza DPM;  [facility];  Service: Podiatry    SD INCISION & DRAINAGE ABSCESS COMPLICATED/MULTIPLE Right 09/17/2024    Procedure: Irrigation and debridement right wrist and hand, any indicated procedures;  Surgeon: Carroll Mccarthy MD;  [facility];  Service: Orthopedics    SD RPR AA HERNIA 1ST < 3 CM REDUCIBLE N/A 7/14/2023    Procedure: REPAIR HERNIA INCISIONAL;  Surgeon: Matt Melo MD;  [facility];  Service: General    SD SYNOVECTOMY EXTENSOR TENDON SHTH WRIST 1 CMPRT Right 10/03/2024    Procedure: Irrigation and debridement, right wrist, volar and dorsal, possible extensor and flexor tenosynovectomy, any indicated procedures;  Surgeon: Carroll Mccarthy MD;  [facility];  Service: Orthopedics    TONSILLECTOMY  1968    Yes    WOUND DEBRIDEMENT Left 4/28/2022    Procedure: Left foot washout;  Surgeon: Hannah Melgoza DPM;  [facility];  Service: Podiatry    WOUND DEBRIDEMENT Left 6/6/2022    Procedure: DEBRIDEMENT WOUND (WASH OUT);  Surgeon: Hannah Melgoza DPM;  [facility];  Service: Podiatry     Family History   Problem Relation Age of Onset    Diabetes Paternal Grandmother     Diabetes Paternal Grandfather     Arthritis Maternal Grandmother      Social History     Socioeconomic History    Marital status: Registered Domestic Partner     Spouse name: Not on file    Number of children: Not on file    Years of education: Not on file    Highest education level: Not on file   Occupational History    Not on file   Tobacco Use    Smoking status: Never    Smokeless tobacco: Never   Vaping Use    Vaping status: Never Used   Substance and Sexual Activity    Alcohol use: Yes     Alcohol/week: 1.0 standard drink of  alcohol     Types: 1 Cans of beer per week     Comment: ocassional    Drug use: Never    Sexual activity: Not Currently     Partners: Female     Birth control/protection: Abstinence   Other Topics Concern    Not on file   Social History Narrative    Not on file     Social Drivers of Health     Financial Resource Strain: Not on file   Food Insecurity: Patient Declined (11/13/2024)    Hunger Vital Sign     Worried About Running Out of Food in the Last Year: Patient declined     Ran Out of Food in the Last Year: Patient declined   Transportation Needs: Patient Declined (11/13/2024)    PRAPARE - Transportation     Lack of Transportation (Medical): Patient declined     Lack of Transportation (Non-Medical): Patient declined   Physical Activity: Not on file   Stress: Not on file   Social Connections: Not on file   Intimate Partner Violence: Not on file   Housing Stability: Patient Declined (11/13/2024)    Housing Stability Vital Sign     Unable to Pay for Housing in the Last Year: Patient declined     Number of Times Moved in the Last Year: 0     Homeless in the Last Year: Patient declined     Scheduled Meds:  Continuous Infusions:No current facility-administered medications for this visit.    PRN Meds:.  Allergies   Allergen Reactions    Cephalexin Hives     Skin peeling  Tolerates penicillins (tolerated unasyn and zosyn)    Pollen Extract Sneezing    Metformin GI Intolerance       Physical Examination:    There were no vitals taken for this visit.    Gen: A&Ox3, NAD    Right Upper Extremity:  Dorsal incision well healed.    tenderness over the 1st dorsal compartment with positive Finkelstein's  Sensation intact to light touch in the axillary median, ulnar, and radial nerve distributions  Approximately 50% composite fist with pain  Minimal wrist range of motion with approximately 20 degrees flexion and 5 degrees extension  Full pronation  Approximately 45 deg supination        Left Upper Extremity:  superficial puncture  wounds over dorsal hand healing well  no purulence or fluctuance, no visible or expressible drainage  Sensation intact to light touch in the axillary median, ulnar, and radial nerve distributions  Full digital and wrist active ROM  Warm, well-perfused digits    Studies:    No new images obtained/reviewed        10/3/24: One culture sample growing Staph epidermidis in broth only -susceptible to the majority of antibiotics except Bactrim and penicillin.  Anaerobic Culture No anaerobes isolated      Growth in Broth culture only Staphylococcus epidermidis Abnormal            Susceptibility     Staphylococcus epidermidis     LEDA (Preliminary)     Cefazolin ($) <=8.00 ug/ml Susceptible     Clindamycin ($) <=0.25 ug/ml Susceptible     Erythromycin ($$$$) <=0.25 ug/ml Susceptible     Gentamicin ($$) <=4 ug/ml Susceptible     Oxacillin <=0.25 ug/ml Susceptible     Penicillin 0.500 ug/ml Resistant     Tetracycline <=4 ug/ml Susceptible     Trimethoprim + Sulfamethoxazole ($$$) >2/38 ug/ml Resistant     Vancomycin ($) 1.00 ug/ml Susceptible                         Assessment and Plan:  1. Tenosynovitis of finger and hand  Case request operating room: RELEASE DEQUERVAINS - Right, Right wrist and hand extensor tenolysis and wrist capsulotomy, possible metacarpophalangeal joint capsulotomy, any indicated procedures    Case request operating room: RELEASE DEQUERVAINS - Right, Right wrist and hand extensor tenolysis and wrist capsulotomy, possible metacarpophalangeal joint capsulotomy, any indicated procedures      2. Dog bite of right wrist with infection, sequela        3. Dog bite of left hand, initial encounter        4. Stiffness of finger joint of right hand  Case request operating room: RELEASE DEQUERVAINS - Right, Right wrist and hand extensor tenolysis and wrist capsulotomy, possible metacarpophalangeal joint capsulotomy, any indicated procedures    Case request operating room: RELEASE DEQUERVAINS - Right, Right wrist and  hand extensor tenolysis and wrist capsulotomy, possible metacarpophalangeal joint capsulotomy, any indicated procedures      5. Wrist stiffness, right  Case request operating room: RELEASE DEQUERVAINS - Right, Right wrist and hand extensor tenolysis and wrist capsulotomy, possible metacarpophalangeal joint capsulotomy, any indicated procedures    Case request operating room: RELEASE DEQUERVAINS - Right, Right wrist and hand extensor tenolysis and wrist capsulotomy, possible metacarpophalangeal joint capsulotomy, any indicated procedures                59 y.o. male presents 3 months status post Irrigation and debridement, right wrist, volar and dorsal, possible extensor and flexor tenosynovectomy, any indicated procedures - Right.   We discussed that stiffness and swelling are his main limiting factors.  We discussed treatment options at this point include continued therapy with her about additional dynamic bracing versus surgical intervention.  The surgical option would be to release scar tissue as well as a 1st dorsal compartment release and then perform manipulation under anesthesia     Due to his diabetes and based on our previous discussions, I do not recommend corticosteroid injections.     After a shared decision-making process and thorough discussion, and elected to proceed with surgical intervention for a right wrist extensor tenolysis, dorsal radiocarpal capsulotomy and possible MP joint capsulotomies followed by manipulation, in addition to a de Quervain's release.  We discussed that although he may have some flexor adhesions preventing active flexion of the fingers we will take this in a stepwise fashion and released more proximally first to see how his symptoms improve.  If he continues to have issues we can discuss later surgical releases on the volar side of the wrist and palm/fingers.  He is amenable to this plan.      We reviewed the risk of surgery including but not limited to infection, bleeding,  injury to nearby structures including the nerve, poor wound healing, continued pain and stiffness, CRPS, and incomplete resolution or recurrence of symptoms. We reviewed the details of the procedure and the anticipated recovery period. All questions answered to patient's satisfaction. Written consent obtained in the office today.      Right wrist and hand extensor tenolysis and wrist capsulotomy, possible metacarpophalangeal joint capsulotomy, any indicated procedures, and right de Quervain's release  Conscious sedation    It is recommended he return to the office post- operatively   he expressed understanding of the plan and agreed. We encouraged them to contact our office with any questions or concerns.         Carroll Mccarthy MD  Hand and Upper Extremity Surgery          *This note was dictated using Dragon voice recognition software. Please excuse any word substitutions or errors.*      Scribe Attestation      I,:  Maurice Rodriguez am acting as a scribe while in the presence of the attending physician.:       I,:  Carroll Mccarhty MD personally performed the services described in this documentation    as scribed in my presence.:

## 2024-12-30 ENCOUNTER — OFFICE VISIT (OUTPATIENT)
Dept: OCCUPATIONAL THERAPY | Age: 59
End: 2024-12-30
Payer: MEDICARE

## 2024-12-30 DIAGNOSIS — M65.949 TENOSYNOVITIS OF FINGER AND HAND: Primary | ICD-10-CM

## 2024-12-30 DIAGNOSIS — S61.452D ANIMAL BITE OF LEFT HAND WITH INFECTION, SUBSEQUENT ENCOUNTER: ICD-10-CM

## 2024-12-30 DIAGNOSIS — L08.9 ANIMAL BITE OF LEFT HAND WITH INFECTION, SUBSEQUENT ENCOUNTER: ICD-10-CM

## 2024-12-30 DIAGNOSIS — Z47.89 AFTERCARE FOLLOWING SURGERY OF THE MUSCULOSKELETAL SYSTEM: ICD-10-CM

## 2024-12-30 PROCEDURE — 97110 THERAPEUTIC EXERCISES: CPT

## 2024-12-30 PROCEDURE — 97140 MANUAL THERAPY 1/> REGIONS: CPT

## 2024-12-30 NOTE — PROGRESS NOTES
"Daily Note    Today's date: 2024  Patient name: Yoni Castillo  : 1965  MRN: [id number]  Referring provider: Yesenia Carrasquillo PA-C  Dx:   Encounter Diagnosis     ICD-10-CM    1. Tenosynovitis of finger and hand  M65.949       2. Animal bite of left hand with infection, subsequent encounter  S61.452D     L08.9       3. Aftercare following surgery of the musculoskeletal system  Z47.89             Start Time: 1044  Stop Time: 1130  Total time in clinic (min): 46 minutes    Subjective: \"My ring finger feels like it's not there today.\"      Objective: See treatment diary below      Assessment: Tolerated treatment well. Stiffness remains. Pt would like to continue with therapy up to his surgery. Will likely need to re-evaluate at that time and will change plan of care to see pt 2-3x/week or as appropriate.      Plan: Continue per plan of care.        Precautions: right hand and wrist s/p I&D 10/3   Re-eval     Manuals  24 12th visit re-eval        Edema mob  5'          Scar tissue mob 5' 10' 10' 10' 10'       STM/IASTM  IASTM intrinsics 5' STM 5' 5'         Cupping  dorsal scar 5'                       Ther Ex                 Scap squeeze             Shoulder ROM            Elbow ROM           Forearm AROM/PROM           Wrist AROM/PROM 5' 2x20 AROM    5' PROM    5' weighted stretch in extension and flexion with 3lb dumbbell 2x20 AROM    5' PROM    5' weighted stretch in extension and flexion with 3lb dumbbell 2x20 AROM    5' PROM    5' weighted stretch in extension and flexion with 3lb dumbbell 2x20 AROM    5' PROM          Digit AROM/PROM 12' 2x20    5' PROM 2x20    5' PROM 2x20    5' PROM 2x20    5' PROM      Thumb AROM/PROM  X20 AROM    2' PROM X20 AROM    2' PROM X20 AROM    2' PROM X20 AROM    2' PROM      Digit abduction  x20 x20 x20 x20      Intrinsic stretch 3x10 2x30s each finger 2x30s each finger 2x30s each finger 2x30s each finger      Wrist maze  x10 Ball roll " with stretch.  Ball roll with stretch.  Ball roll with stretch.      Graded strength                                     Ther Activity             Grasp/release            Dexterity           Mari's Carry PRN           Small tool use/small part activity                                              Neuro Re-Ed                                               Ortho Fit            Static progressive splinting for wrist/finger flexion/extension                                   Modalities            HP  5' 5' 5' 5' 5'

## 2025-01-06 ENCOUNTER — ANESTHESIA EVENT (OUTPATIENT)
Age: 60
End: 2025-01-06
Payer: MEDICARE

## 2025-01-07 NOTE — ASSESSMENT & PLAN NOTE
"Utilizes tramadol  PDMP meeting attending; \"no red flags\"  " Ergocalciferol 50,000 IU weekly for 8 weeks.  Repeat 25 OHD around 7 to 8-week kymberly.  Follow-up around 8 to 9 weeks.  Orders:    Vitamin D, Ergocalciferol, 23153 units CAPS; Take 50,000 Units by mouth once a week    Vitamin D 25 hydroxy; Future

## 2025-01-08 ENCOUNTER — APPOINTMENT (OUTPATIENT)
Dept: OCCUPATIONAL THERAPY | Age: 60
End: 2025-01-08
Payer: MEDICARE

## 2025-01-10 DIAGNOSIS — Z47.89 AFTERCARE FOLLOWING SURGERY OF THE MUSCULOSKELETAL SYSTEM: ICD-10-CM

## 2025-01-10 DIAGNOSIS — M51.369 DDD (DEGENERATIVE DISC DISEASE), LUMBAR: ICD-10-CM

## 2025-01-10 DIAGNOSIS — M65.90 TENOSYNOVITIS: ICD-10-CM

## 2025-01-10 DIAGNOSIS — G89.18 ACUTE POST-OPERATIVE PAIN: ICD-10-CM

## 2025-01-10 RX ORDER — METHOCARBAMOL 750 MG/1
1500 TABLET, FILM COATED ORAL EVERY 8 HOURS SCHEDULED
Qty: 30 TABLET | Refills: 0 | Status: SHIPPED | OUTPATIENT
Start: 2025-01-10

## 2025-01-10 NOTE — TELEPHONE ENCOUNTER
Medication:  PDMP 12/18/2024 12/18/2024 traMADol HCL (Tablet) 60.0 30 50 MG 20.0 [facility] Commercial Insurance 0 / 0 PA   1 [id number] 12/11/2024 12/11/2024 oxyCODONE HCL (Tablet) 10.0 5 5 MG 15.0 [facility] 0 / 0 PA   1 [id number] 11/19/2024 11/19/2024 oxyCODONE HCL (Tablet) 10.0 3 5 MG 25.0 [facility] 0 / 0 PA   1 [id number] 11/18/2024 11/18/2024 traMADol HCL (Tablet) 60.0 30 50 MG 20.0 [facility]  Refill must be reviewed and completed by the office or provider. The refill is unable to be approved or denied by the medication management team.

## 2025-01-13 RX ORDER — OXYCODONE HYDROCHLORIDE 5 MG/1
5 TABLET ORAL EVERY 6 HOURS PRN
Qty: 5 TABLET | Refills: 0 | Status: SHIPPED | OUTPATIENT
Start: 2025-01-13 | End: 2025-01-20

## 2025-01-16 ENCOUNTER — APPOINTMENT (OUTPATIENT)
Dept: OCCUPATIONAL THERAPY | Age: 60
End: 2025-01-16
Payer: MEDICARE

## 2025-01-16 NOTE — PRE-PROCEDURE INSTRUCTIONS
Pre-Surgery Instructions:   Medication Instructions    acetaminophen (TYLENOL) 500 mg tablet Uses PRN- OK to take day of surgery    Acetylcysteine (NAC PO) Stop taking 4 days prior to surgery.    Alpha-Lipoic Acid 600 MG CAPS Stop taking 4 days prior to surgery.    ammonium lactate (LAC-HYDRIN) 12 % lotion Hold day of surgery.    Apple Cider Vinegar 300 MG TABS Stop taking 4 days prior to surgery.    Ascorbic Acid (vitamin C) 1000 MG tablet Stop taking 4 days prior to surgery.    BENFOTIAMINE PO Stop taking 4 days prior to surgery.    beta carotene 30 MG capsule Stop taking 4 days prior to surgery.    carvedilol (COREG) 25 mg tablet Take day of surgery.    Chromium Picolinate 200 MCG TABS Stop taking 4 days prior to surgery.    dulaglutide (Trulicity) 0.75 MG/0.5ML injection Stop taking 7 days prior to surgery.    Empagliflozin (Jardiance) 25 MG TABS Stop taking 4 days prior to surgery.    Garlic 1200 MG CAPS Stop taking 4 days prior to surgery.         IRON, FERROUS SULFATE, PO Stop taking 4 days prior to surgery.    lidocaine (Lidoderm) 5 % Hold day of surgery.    lisinopril-hydrochlorothiazide (PRINZIDE,ZESTORETIC) 20-12.5 MG per tablet Hold day of surgery.    Lutein-Bilberry (LUTEIN PLUS BILBERRY PO) Stop taking 4 days prior to surgery.    methocarbamol (ROBAXIN) 750 mg tablet Uses PRN- OK to take day of surgery    NON FORMULARY Stop taking 4 days prior to surgery.    oxyCODONE (ROXICODONE) 5 immediate release tablet Uses PRN- OK to take day of surgery    Pyridoxine HCl (B-6 PO) Stop taking 4 days prior to surgery.    TART CHERRY PO Stop taking 4 days prior to surgery.    traMADol (Ultram) 50 mg tablet Uses PRN- OK to take day of surgery    Turmeric (QC TUMERIC COMPLEX PO) Stop taking 4 days prior to surgery.    VITAMIN D PO Stop taking 4 days prior to surgery.    vitamin E, tocopherol, 400 units capsule Stop taking 4 days prior to surgery.    Zinc 50 MG CAPS Stop taking 4 days prior to surgery.    Medication  instructions for day surgery reviewed. Please use only a sip of water to take your instructed medications. Avoid all over the counter vitamins, supplements and NSAIDS for one week prior to surgery per anesthesia guidelines. Tylenol is ok to take as needed.     You will receive a call one business day prior to surgery with an arrival time and hospital directions. If your surgery is scheduled on a Monday, the hospital will be calling you on the Friday prior to your surgery. If you have not heard from anyone by 8pm, please call the hospital supervisor through the hospital  at [phone]. ([place] [phone] or [place] [phone]).    Do not eat or drink anything after midnight the night before your surgery, including candy, mints, lifesavers, or chewing gum. Do not drink alcohol 24hrs before your surgery. Try not to smoke at least 24hrs before your surgery.       Follow the pre surgery showering instructions as listed in the “My Surgical Experience Booklet” or otherwise provided by your surgeon's office. Do not use a blade to shave the surgical area 1 week before surgery. It is okay to use a clean electric clippers up to 24 hours before surgery. Do not apply any lotions, creams, including makeup, cologne, deodorant, or perfumes after showering on the day of your surgery. Do not use dry shampoo, hair spray, hair gel, or any type of hair products.     No contact lenses, eye make-up, or artificial eyelashes. Remove nail polish, including gel polish, and any artificial, gel, or acrylic nails if possible. Remove all jewelry including rings and body piercing jewelry.     Wear causal clothing that is easy to take on and off. Consider your type of surgery.    Keep any valuables, jewelry, piercings at home. Please bring any specially ordered equipment (sling, braces) if indicated.    Arrange for a responsible person to drive you to and from the hospital on the day of your surgery. Please confirm the visitor  policy for the day of your procedure when you receive your phone call with an arrival time.     Call the surgeon's office with any new illnesses, exposures, or additional questions prior to surgery.    Please reference your “My Surgical Experience Booklet” for additional information to prepare for your upcoming surgery.

## 2025-01-17 PROBLEM — M25.641 STIFFNESS OF FINGER JOINT OF RIGHT HAND: Status: ACTIVE | Noted: 2025-01-17

## 2025-01-17 PROBLEM — M65.949 TENOSYNOVITIS OF FINGER AND HAND: Status: ACTIVE | Noted: 2024-09-18

## 2025-01-17 PROBLEM — M25.631 WRIST STIFFNESS, RIGHT: Status: ACTIVE | Noted: 2025-01-17

## 2025-01-17 NOTE — DISCHARGE INSTR - AVS FIRST PAGE
POST-OPERATIVE INSTRUCTIONS  DEQUERVAIN'S (FIRST DORSAL COMPARTMENT) RELEASE, EXTENSOR TENOLYSIS AND WRIST CAPSULOTOMY    You have just undergone a Dequervain's (First Dorsal Compartment) release, extensor tenolysis and wrist capsulotomy by Dr. Mccarthy in the operating room.  It is our wish that your postoperative recovery be as quick and comfortable as possible.  Please carefully review the following items that are important in your recovery.    Pain Control:  After any operation, a certain degree of pain is to be expected.  A prescription for acetaminophen and/or ibuprofen has been electronically sent to your pharmacy. Do not exceed 3000 mg of Tylenol per day. This medication will relieve most of your pain but may not relieve all of the pain. Some pain is normal post operatively.     When you go home, please keep your operated arm elevated at all times (above the level of your heart.)  If you do this, your swelling will be diminished and your pain will be diminished as well.    Dressing Care:  After surgery, make sure that your dressing is kept dry.  You can take a shower if you cover your arm with a plastic bag, such as a newspaper bag, and use tape or rubber bands. If your dressing gets wet you may take it off, place Band-Aids on the wound and re-cover it with sterile dressings which you can obtain at your local drug store.    Please remove your dressing down to the incision 3 days after your surgery. For example, if you had surgery on a Monday, do this on Thursday.  If your surgery was on Thursday, do this on Sunday.   If your dressing gets wet prior to removing it, please take if off and place something clean, or bandaids, on the wound. After removal of your surgical dressing, you may leave the incision open to air or cover with a Band-Aid. You may begin to shower regularly and allow the clean, soapy water to run over the incision site. Do not scrub the incision.     Weight Bearing:  You should NOT bear weight  >5lbs through your operative extremity. Do not push off using your operative extremity.     If you have a removable wrist and thumb brace you may wear that over your surgical dressing until your first follow up appointment if as you feel comfortable. It is not required to wear this splint.     It is ok to move your fingers as tolerated to prevent stiffness. You may also use your operative hand to assist with light activities of daily living such as putting on clothes, brushing your teeth and eating as tolerated    Please work on these finger range of motions exercises between now and you follow up visit:         Follow Up:  If you don't already have a scheduled postoperative appointment, please call our office for a follow-up appointment. It is best to call the day after surgery to make an appointment in 10-14 days.  At your first postoperative visit, you will be seen by either Dr. Mccarthy, his PA; or one of their associates. The sutures will be removed and you may be asked to see a hand therapist to optimize your functional result. Each of the hand therapists that you will be referred to have received special training in the care of the hand and upper extremity.    When to Call:  It is normal to see minor staining on the hand surgery dressing after surgery. If there is significant bleeding, you are advised to call the office during regular office hours to have this checked.     If you feel that you have a surgical emergency postoperatively that requires immediate attention, please call [phone]. In addition, any emergency can also be handled by the emergency room.      If you have questions regarding your surgery postop that you feel requires attention, please call the office.   If this occurs after our regular office hours, there is a message with instructions to talk to the physician on call.

## 2025-01-19 DIAGNOSIS — M79.605 LEFT LEG PAIN: ICD-10-CM

## 2025-01-20 ENCOUNTER — ANESTHESIA (OUTPATIENT)
Age: 60
End: 2025-01-20
Payer: MEDICARE

## 2025-01-20 ENCOUNTER — APPOINTMENT (OUTPATIENT)
Dept: OCCUPATIONAL THERAPY | Age: 60
End: 2025-01-20
Payer: MEDICARE

## 2025-01-20 ENCOUNTER — HOSPITAL ENCOUNTER (OUTPATIENT)
Age: 60
Setting detail: OUTPATIENT SURGERY
Discharge: HOME | End: 2025-01-20
Attending: ORTHOPAEDIC SURGERY | Admitting: ORTHOPAEDIC SURGERY
Payer: MEDICARE

## 2025-01-20 VITALS
HEIGHT: 72 IN | TEMPERATURE: 97.7 F | HEART RATE: 80 BPM | BODY MASS INDEX: 39.52 KG/M2 | DIASTOLIC BLOOD PRESSURE: 92 MMHG | SYSTOLIC BLOOD PRESSURE: 161 MMHG | RESPIRATION RATE: 18 BRPM | OXYGEN SATURATION: 92 % | WEIGHT: 291.8 LBS

## 2025-01-20 DIAGNOSIS — M25.631 WRIST STIFFNESS, RIGHT: ICD-10-CM

## 2025-01-20 DIAGNOSIS — M65.949 TENOSYNOVITIS OF FINGER AND HAND: Primary | ICD-10-CM

## 2025-01-20 DIAGNOSIS — Z47.89 AFTERCARE FOLLOWING SURGERY OF THE MUSCULOSKELETAL SYSTEM: ICD-10-CM

## 2025-01-20 DIAGNOSIS — M25.641 STIFFNESS OF FINGER JOINT OF RIGHT HAND: ICD-10-CM

## 2025-01-20 DIAGNOSIS — M67.40 MUCOID CYST OF JOINT: ICD-10-CM

## 2025-01-20 LAB
GLUCOSE SERPL-MCNC: 183 MG/DL (ref 65–140)
GLUCOSE SERPL-MCNC: 187 MG/DL (ref 65–140)

## 2025-01-20 PROCEDURE — 82948 REAGENT STRIP/BLOOD GLUCOSE: CPT

## 2025-01-20 PROCEDURE — 25295 RELEASE WRIST/FOREARM TENDON: CPT | Performed by: ORTHOPAEDIC SURGERY

## 2025-01-20 PROCEDURE — 25085 INCISION OF WRIST CAPSULE: CPT | Performed by: ORTHOPAEDIC SURGERY

## 2025-01-20 PROCEDURE — 25000 INCISION OF TENDON SHEATH: CPT

## 2025-01-20 PROCEDURE — 25000 INCISION OF TENDON SHEATH: CPT | Performed by: ORTHOPAEDIC SURGERY

## 2025-01-20 PROCEDURE — 25085 INCISION OF WRIST CAPSULE: CPT

## 2025-01-20 PROCEDURE — 25295 RELEASE WRIST/FOREARM TENDON: CPT

## 2025-01-20 RX ORDER — GLYCOPYRROLATE 0.2 MG/ML
INJECTION INTRAMUSCULAR; INTRAVENOUS AS NEEDED
Status: DISCONTINUED | OUTPATIENT
Start: 2025-01-20 | End: 2025-01-20

## 2025-01-20 RX ORDER — DOCUSATE SODIUM 100 MG/1
100 CAPSULE, LIQUID FILLED ORAL DAILY PRN
Qty: 10 CAPSULE | Refills: 0 | Status: SHIPPED | OUTPATIENT
Start: 2025-01-20

## 2025-01-20 RX ORDER — SODIUM CHLORIDE, SODIUM LACTATE, POTASSIUM CHLORIDE, CALCIUM CHLORIDE 600; 310; 30; 20 MG/100ML; MG/100ML; MG/100ML; MG/100ML
125 INJECTION, SOLUTION INTRAVENOUS CONTINUOUS
Status: DISCONTINUED | OUTPATIENT
Start: 2025-01-20 | End: 2025-01-20 | Stop reason: HOSPADM

## 2025-01-20 RX ORDER — CLINDAMYCIN PHOSPHATE 900 MG/50ML
900 INJECTION, SOLUTION INTRAVENOUS ONCE
Status: COMPLETED | OUTPATIENT
Start: 2025-01-20 | End: 2025-01-20

## 2025-01-20 RX ORDER — SODIUM CHLORIDE, SODIUM LACTATE, POTASSIUM CHLORIDE, CALCIUM CHLORIDE 600; 310; 30; 20 MG/100ML; MG/100ML; MG/100ML; MG/100ML
INJECTION, SOLUTION INTRAVENOUS CONTINUOUS PRN
Status: DISCONTINUED | OUTPATIENT
Start: 2025-01-20 | End: 2025-01-20

## 2025-01-20 RX ORDER — OXYCODONE HYDROCHLORIDE 5 MG/1
5 TABLET ORAL EVERY 6 HOURS PRN
Status: DISCONTINUED | OUTPATIENT
Start: 2025-01-20 | End: 2025-01-20 | Stop reason: HOSPADM

## 2025-01-20 RX ORDER — ONDANSETRON 4 MG/1
4 TABLET, FILM COATED ORAL EVERY 8 HOURS PRN
Qty: 20 TABLET | Refills: 0 | Status: SHIPPED | OUTPATIENT
Start: 2025-01-20

## 2025-01-20 RX ORDER — PHENYLEPHRINE HCL IN 0.9% NACL 1 MG/10 ML
SYRINGE (ML) INTRAVENOUS AS NEEDED
Status: DISCONTINUED | OUTPATIENT
Start: 2025-01-20 | End: 2025-01-20

## 2025-01-20 RX ORDER — EPHEDRINE SULFATE 50 MG/ML
INJECTION INTRAVENOUS AS NEEDED
Status: DISCONTINUED | OUTPATIENT
Start: 2025-01-20 | End: 2025-01-20

## 2025-01-20 RX ORDER — HYDROMORPHONE HCL/PF 1 MG/ML
0.5 SYRINGE (ML) INJECTION
Status: DISCONTINUED | OUTPATIENT
Start: 2025-01-20 | End: 2025-01-20 | Stop reason: HOSPADM

## 2025-01-20 RX ORDER — MIDAZOLAM HYDROCHLORIDE 2 MG/2ML
INJECTION, SOLUTION INTRAMUSCULAR; INTRAVENOUS AS NEEDED
Status: DISCONTINUED | OUTPATIENT
Start: 2025-01-20 | End: 2025-01-20

## 2025-01-20 RX ORDER — TRAMADOL HYDROCHLORIDE 50 MG/1
100 TABLET ORAL
Qty: 60 TABLET | Refills: 0 | Status: SHIPPED | OUTPATIENT
Start: 2025-01-20

## 2025-01-20 RX ORDER — SODIUM CHLORIDE, SODIUM LACTATE, POTASSIUM CHLORIDE, CALCIUM CHLORIDE 600; 310; 30; 20 MG/100ML; MG/100ML; MG/100ML; MG/100ML
100 INJECTION, SOLUTION INTRAVENOUS CONTINUOUS
OUTPATIENT
Start: 2025-01-20

## 2025-01-20 RX ORDER — OXYCODONE HYDROCHLORIDE 5 MG/1
5 TABLET ORAL EVERY 6 HOURS PRN
Qty: 15 TABLET | Refills: 0 | Status: SHIPPED | OUTPATIENT
Start: 2025-01-20

## 2025-01-20 RX ORDER — FENTANYL CITRATE/PF 50 MCG/ML
25 SYRINGE (ML) INJECTION
Status: DISCONTINUED | OUTPATIENT
Start: 2025-01-20 | End: 2025-01-20 | Stop reason: HOSPADM

## 2025-01-20 RX ORDER — LIDOCAINE HYDROCHLORIDE 20 MG/ML
INJECTION, SOLUTION EPIDURAL; INFILTRATION; INTRACAUDAL; PERINEURAL AS NEEDED
Status: DISCONTINUED | OUTPATIENT
Start: 2025-01-20 | End: 2025-01-20

## 2025-01-20 RX ORDER — MAGNESIUM HYDROXIDE 1200 MG/15ML
LIQUID ORAL AS NEEDED
Status: DISCONTINUED | OUTPATIENT
Start: 2025-01-20 | End: 2025-01-20 | Stop reason: HOSPADM

## 2025-01-20 RX ORDER — ACETAMINOPHEN 325 MG/1
650 TABLET ORAL EVERY 6 HOURS PRN
Status: DISCONTINUED | OUTPATIENT
Start: 2025-01-20 | End: 2025-01-20 | Stop reason: HOSPADM

## 2025-01-20 RX ORDER — ACETAMINOPHEN 500 MG
1000 TABLET ORAL EVERY 8 HOURS PRN
Qty: 60 TABLET | Refills: 0 | Status: SHIPPED | OUTPATIENT
Start: 2025-01-20

## 2025-01-20 RX ORDER — FENTANYL CITRATE 50 UG/ML
INJECTION, SOLUTION INTRAMUSCULAR; INTRAVENOUS AS NEEDED
Status: DISCONTINUED | OUTPATIENT
Start: 2025-01-20 | End: 2025-01-20

## 2025-01-20 RX ORDER — ACETAMINOPHEN 325 MG/1
975 TABLET ORAL ONCE
Status: COMPLETED | OUTPATIENT
Start: 2025-01-20 | End: 2025-01-20

## 2025-01-20 RX ORDER — KETAMINE HCL IN NACL, ISO-OSM 100MG/10ML
SYRINGE (ML) INJECTION AS NEEDED
Status: DISCONTINUED | OUTPATIENT
Start: 2025-01-20 | End: 2025-01-20

## 2025-01-20 RX ORDER — ONDANSETRON 2 MG/ML
INJECTION INTRAMUSCULAR; INTRAVENOUS AS NEEDED
Status: DISCONTINUED | OUTPATIENT
Start: 2025-01-20 | End: 2025-01-20

## 2025-01-20 RX ORDER — SUCCINYLCHOLINE/SOD CL,ISO/PF 100 MG/5ML
SYRINGE (ML) INTRAVENOUS AS NEEDED
Status: DISCONTINUED | OUTPATIENT
Start: 2025-01-20 | End: 2025-01-20

## 2025-01-20 RX ORDER — ONDANSETRON 2 MG/ML
4 INJECTION INTRAMUSCULAR; INTRAVENOUS ONCE AS NEEDED
Status: DISCONTINUED | OUTPATIENT
Start: 2025-01-20 | End: 2025-01-20 | Stop reason: HOSPADM

## 2025-01-20 RX ORDER — PROPOFOL 10 MG/ML
INJECTION, EMULSION INTRAVENOUS CONTINUOUS PRN
Status: DISCONTINUED | OUTPATIENT
Start: 2025-01-20 | End: 2025-01-20

## 2025-01-20 RX ADMIN — LIDOCAINE HYDROCHLORIDE 100 MG: 20 INJECTION, SOLUTION EPIDURAL; INFILTRATION; INTRACAUDAL; PERINEURAL at 11:46

## 2025-01-20 RX ADMIN — EPHEDRINE SULFATE 10 MG: 50 INJECTION, SOLUTION INTRAVENOUS at 13:14

## 2025-01-20 RX ADMIN — PROPOFOL 100 MCG/KG/MIN: 10 INJECTION, EMULSION INTRAVENOUS at 11:47

## 2025-01-20 RX ADMIN — GLYCOPYRROLATE 0.2 MG: 0.2 INJECTION, SOLUTION INTRAMUSCULAR; INTRAVENOUS at 11:41

## 2025-01-20 RX ADMIN — Medication 100 MG: at 11:53

## 2025-01-20 RX ADMIN — Medication 100 MCG: at 12:04

## 2025-01-20 RX ADMIN — CLINDAMYCIN PHOSPHATE 900 MG: 900 INJECTION, SOLUTION INTRAVENOUS at 11:45

## 2025-01-20 RX ADMIN — Medication 40 MG: at 11:45

## 2025-01-20 RX ADMIN — SODIUM CHLORIDE, SODIUM LACTATE, POTASSIUM CHLORIDE, AND CALCIUM CHLORIDE 125 ML/HR: .6; .31; .03; .02 INJECTION, SOLUTION INTRAVENOUS at 11:29

## 2025-01-20 RX ADMIN — SODIUM CHLORIDE, SODIUM LACTATE, POTASSIUM CHLORIDE, AND CALCIUM CHLORIDE: .6; .31; .03; .02 INJECTION, SOLUTION INTRAVENOUS at 11:41

## 2025-01-20 RX ADMIN — ONDANSETRON 4 MG: 2 INJECTION INTRAMUSCULAR; INTRAVENOUS at 13:21

## 2025-01-20 RX ADMIN — MIDAZOLAM 2 MG: 1 INJECTION INTRAMUSCULAR; INTRAVENOUS at 11:41

## 2025-01-20 RX ADMIN — Medication 100 MCG: at 12:00

## 2025-01-20 RX ADMIN — OXYCODONE 5 MG: 5 TABLET ORAL at 14:14

## 2025-01-20 RX ADMIN — ACETAMINOPHEN 325MG 975 MG: 325 TABLET ORAL at 11:27

## 2025-01-20 RX ADMIN — Medication 100 MCG: at 12:10

## 2025-01-20 RX ADMIN — FENTANYL CITRATE 25 MCG: 50 INJECTION INTRAMUSCULAR; INTRAVENOUS at 12:42

## 2025-01-20 RX ADMIN — Medication 10 MG: at 11:57

## 2025-01-20 RX ADMIN — SODIUM CHLORIDE, SODIUM LACTATE, POTASSIUM CHLORIDE, AND CALCIUM CHLORIDE: .6; .31; .03; .02 INJECTION, SOLUTION INTRAVENOUS at 13:06

## 2025-01-20 RX ADMIN — Medication 100 MCG: at 12:16

## 2025-01-20 RX ADMIN — PROPOFOL 50 MG: 10 INJECTION, EMULSION INTRAVENOUS at 12:49

## 2025-01-20 RX ADMIN — Medication 100 MCG: at 13:12

## 2025-01-20 RX ADMIN — Medication 100 MCG: at 13:09

## 2025-01-20 NOTE — INTERVAL H&P NOTE
H&P reviewed. After examining the patient I find no changes in the patients condition since the H&P had been written.    Vitals:    01/20/25 1112   BP: 154/91   Pulse: 71   Resp: 18   Temp: 98.8 °F (37.1 °C)   SpO2: 98%

## 2025-01-20 NOTE — ANESTHESIA POSTPROCEDURE EVALUATION
Post-Op Assessment Note    CV Status:  Stable    Pain management: adequate       Mental Status:  Alert   Hydration Status:  Euvolemic   PONV Controlled:  Controlled   Airway Patency:  Patent  Two or more mitigation strategies used for obstructive sleep apnea   Post Op Vitals Reviewed: Yes    No anethesia notable event occurred.    Staff: Anesthesiologist       Last Filed PACU Vitals:  Vitals Value Taken Time   Temp     Pulse     BP     Resp     SpO2

## 2025-01-20 NOTE — ANESTHESIA PREPROCEDURE EVALUATION
Procedure:  RELEASE DEQUERVAINS - Right (Right: Hand)  Right wrist and hand extensor tenolysis and wrist capsulotomy, possible metacarpophalangeal joint capsulotomy, any indicated procedures (Right: Hand)     - denies any chest pain, palpitations, shortness of breath, syncope, lightheadedness, seizures   - denies any recent infectious symptoms such as fevers, chills, cough   - denies taking any anticoagulation medications or any issues with bleeding, bruising, clotting    Relevant Problems   ANESTHESIA (within normal limits)      CARDIO   (+) Essential hypertension      ENDO   (+) Type 2 diabetes mellitus with chronic kidney disease, without long-term current use of insulin (HCC)      GI/HEPATIC (within normal limits)      /RENAL   (+) Chronic kidney disease, stage 3 (HCC)   (+) Staghorn calculus      GYN (within normal limits)      HEMATOLOGY (within normal limits)      MUSCULOSKELETAL   (+) Low back pain with sciatica      NEURO/PSYCH (within normal limits)      PULMONARY (within normal limits)      Lab Results   Component Value Date    WBC 5.68 09/21/2024    HGB 10.9 (L) 10/03/2024    HCT 32 (L) 10/03/2024    MCV 95 09/21/2024     09/21/2024     Lab Results   Component Value Date    SODIUM 137 09/21/2024    K 3.8 09/21/2024     09/21/2024    CO2 27 10/03/2024    AGAP 7 09/21/2024    BUN 23 09/21/2024    CREATININE 1.47 (H) 09/21/2024    GLUC 109 09/21/2024    GLUF 130 (H) 09/12/2024    CALCIUM 8.7 09/21/2024    AST 18 09/20/2024    ALT 17 09/20/2024    ALKPHOS 45 09/20/2024    TP 6.5 09/20/2024    TBILI 0.27 09/20/2024    EGFR 50 09/21/2024           Physical Exam    Airway    Mallampati score: III  TM Distance: >3 FB  Neck ROM: full     Dental   Comment: Has dentures, Poor dental hygiene, denies any loose teeth      Cardiovascular  Rhythm: regular, Rate: normal    Pulmonary   Decreased breath sounds    Other Findings    Anesthesia Plan  ASA Score- 3     Anesthesia Type- IV sedation with anesthesia  with ASA Monitors.         Additional Monitors:     Airway Plan:            Plan Factors-Exercise tolerance (METS): >4 METS.    Chart reviewed. EKG reviewed.  Existing labs reviewed. Patient summary reviewed.    Patient is not a current smoker.  Patient did not smoke on day of surgery.    Obstructive sleep apnea risk education given perioperatively.        Induction- intravenous.    Postoperative Plan- Plan for postoperative opioid use.         Informed Consent- Anesthetic plan and risks discussed with patient and spouse.  I personally reviewed this patient with the CRNA. Discussed and agreed on the Anesthesia Plan with the CRNA..

## 2025-01-20 NOTE — OP NOTE
OPERATIVE REPORT  PATIENT NAME: Yoni Castillo    :  1965  MRN: [id number]  Pt Location: [facility] OR ROOM 06    SURGERY DATE: 2025    Surgeons and Role:     * Carroll Mccarthy MD - Primary     * Yesenia Carrasquillo PA-C - Assisting    Preop Diagnosis:  Tenosynovitis of finger and hand [M65.949]  Stiffness of finger joint of right hand [M25.641]  Wrist stiffness, right [M25.631]    Post-Op Diagnosis Codes:     * Tenosynovitis of finger and hand [M65.949]     * Stiffness of finger joint of right hand [M25.641]     * Wrist stiffness, right [M25.631]    Procedure(s):  Right - RELEASE DEQUERVAINS - Right  Right - Right wrist and hand extensor tenolysis and dorsal wrist capsulotomy    Specimen(s):  * No specimens in log *    Estimated Blood Loss:   Minimal    Drains:  * No LDAs found *    Anesthesia Type:   Conscious Sedation     Operative Indications:  Tenosynovitis of finger and hand [M65.949]  Stiffness of finger joint of right hand [M25.641]  Wrist stiffness, right [M25.631]     60 y/o Male status post a right dorsal hand infection status post multiple debridements.  He has developed significant stiffness of his wrist and hand, with the ability to only perform 50% of a composite fist, with 20 degrees flexion and 5 degrees extension.  At this point he has failed to make any significant improvement with 4 months of home exercises and physical therapy.  He also has significant pain over the first dorsal compartment positive Finkelstein's test consistent with de Quervain's tendinitis.  After discussion of risk benefits and alternatives he made the decision to proceed with surgical intervention to help improve his range of motion.    Operative Findings:  Extensive scarring of the EDC tendons at the dorsal wrist.  Very thickened dorsal wrist joint capsule.    Thickened first dorsal compartment with inflamed tenosynovium surrounding the first dorsal compartment tendons.  Separate EPB subs sheath.      Complications:    None    Procedure and Technique:  The patient was greeted in the preoperative area. The risks, benefits, and alternatives of the procedure were discussed in detail with the patient. Risks include pain, stiffness, swelling, injury to neurovascular structure, infection, need for reoperation, and anesthetic complications. The patient was amenable and signed the informed consent. The operative extremity was marked. Patient was brought to the operating room and placed under anesthesia. A tourniquet was applied. The operative extremity was prepped and draped in the usual sterile fashion.  Preoperative antibiotics were administered.  A timeout was performed identifying the name and laterality of the procedure. The limb was exsanguinated and the tourniquet was inflated.     We started with a de Quervain's release.  A 3 cm incision was carried out in longitudinal fashion over the first dorsal compartment centered, on the radial styloid. Branches of the radial sensory nerve were identified and protected. The distal extent of the first compartment was identified, as well as its dorsal and volar borders. A deep 15 blade was used to incise the thickened first compartment from distal to proximal. The Edison's scissors were then used to completely the release both proximally and distally. An EPB subsheath was identified and separately incised. Any remaining septal tissue between the APL and EPB tendons was removed.  There is inflamed tenosynovium surrounding the EPB and EPL tendons which was excised with tenotomy scissors.    We then proceeded with our extensor tenolysis.  The previous dorsal incision was utilized to make a dorsal midline approach to the wrist there is extensive scarring noted deep to the incision and around the EDC tendons.  Full-thickness flaps were raised radially and ulnarly.  The EPL tendon was identified and was transposed position and dissected free of surrounding scar tissue.  The second and fifth dorsal  compartments were identified and the tendons were intact with minimal adhesions and scar tissue.  The majority of issues seem to be coming from the fourth dorsal compartment.  We then carefully Melida lysed each EDC extensor tendon and the EIP tendon using tenotomy scissors and a scalpel.  Tenolysis knife was used to release distally.  Deep to the tendons out over the proximal phalanges.  The tendons that were then retracted and a T capsulotomy was made in the dorsal wrist capsule.  And extended to the level of the second dorsal compartment radially and the fifth dorsal compartment ulnarly.  Second wrist capsule was then excised deep to the normal-appearing capsule.  The SL ligament was noted to be intact.   We then performed a manipulation of the wrist and fingers through passive flexion and extension.    Following our manipulation I was able to flex the wrist to 70 degrees and extend the wrist to 45 degrees.  I was able to flex the fingers down into a composite fist.  We doing this an adequate release and his significant improvement in his range of motion passively    The wound was thoroughly irrigated. The tourniquet was released and hemostasis achieved. The wound was closed in an interrupted fashion. Sterile dressings were applied. The patient was awoken and transported to the recovery room in stable condition.      I was present for the entire procedure., A qualified resident physician was not available., and A physician assistant was required during the procedure for retraction, tissue handling, dissection and suturing.    Patient Disposition:  PACU              SIGNATURE: Carroll Mccarthy MD  DATE: January 20, 2025  TIME: 1:22 PM

## 2025-01-22 ENCOUNTER — OFFICE VISIT (OUTPATIENT)
Dept: OCCUPATIONAL THERAPY | Age: 60
End: 2025-01-22
Payer: MEDICARE

## 2025-01-22 DIAGNOSIS — M25.641 STIFFNESS OF FINGER JOINT OF RIGHT HAND: ICD-10-CM

## 2025-01-22 DIAGNOSIS — M65.949 TENOSYNOVITIS OF FINGER AND HAND: Primary | ICD-10-CM

## 2025-01-22 DIAGNOSIS — L08.9 ANIMAL BITE OF LEFT HAND WITH INFECTION, SUBSEQUENT ENCOUNTER: ICD-10-CM

## 2025-01-22 DIAGNOSIS — S61.452D ANIMAL BITE OF LEFT HAND WITH INFECTION, SUBSEQUENT ENCOUNTER: ICD-10-CM

## 2025-01-22 DIAGNOSIS — Z47.89 AFTERCARE FOLLOWING SURGERY OF THE MUSCULOSKELETAL SYSTEM: ICD-10-CM

## 2025-01-22 DIAGNOSIS — M25.631 WRIST STIFFNESS, RIGHT: ICD-10-CM

## 2025-01-22 PROCEDURE — 97168 OT RE-EVAL EST PLAN CARE: CPT

## 2025-01-22 PROCEDURE — 97110 THERAPEUTIC EXERCISES: CPT

## 2025-01-22 PROCEDURE — 97140 MANUAL THERAPY 1/> REGIONS: CPT

## 2025-01-22 NOTE — PROGRESS NOTES
OT Re-Evaluation     Today's date: 2025  Patient name: Yoni Castillo  : 1965  MRN: [id number]  Referring provider: Yesenia Carrasquillo PA-C  Dx:   Encounter Diagnosis     ICD-10-CM    1. Tenosynovitis of finger and hand  M65.949       2. Animal bite of left hand with infection, subsequent encounter  S61.452D     L08.9       3. Aftercare following surgery of the musculoskeletal system  Z47.89 Ambulatory referral to PT/OT hand therapy      4. Stiffness of finger joint of right hand  M25.641 Ambulatory referral to PT/OT hand therapy      5. Wrist stiffness, right  M25.631 Ambulatory referral to PT/OT hand therapy                     Assessment  Impairments: abnormal or restricted ROM, activity intolerance, impaired physical strength, lacks appropriate home exercise program and weight-bearing intolerance  Symptom irritability: moderate    Assessment details: Pt presents for OT for re-eval s/p R wrist extensor tenolysis, wrist capsulotomy, and de quervain's release. Subjectively, pt reports pain and discomfort. He is unable to use R hand for ADLs, IADLs, and lesiure activities. Removed post-op dressings, there is no sign of infection and there is minimal bleeding. Objectively, he is unable to extend fingers past -10 degrees. Pt also has swelling in the wrist and digits. OT recommending continued therapy. Pt understands/agrees with current POC.   Understanding of Dx/Px/POC: good     Prognosis: good    Goals  Short term goals:  To be achieved within 3-4 visits from start of care on .   Patient will demonstrate independence with scar mobilization techniques and post-op wound care instructions.   Patient will demonstrate independence with all AROM and stretching HEPs.  Patient will verbalize understanding of activity limitations within post-op precautions for improved symptom management.   Patient will verbalize understanding of activity modifications to maximize functional use of right hand throughout normal  routine.   Patient will tolerate therapeutic exercises/activities with reports of pain <2/10.      Long term goals:  To be achieved at time of discharge:   Patient will demonstrate improved AROM to WFL compared to uninvolved side.  Patient will begin to report decreased pain with completion of ADLs (donning shoes/dressing, etc.).   Patient will begin to report improved completion of IADLs (cooking, washing dishes, cleaning, etc.) with implementation of recommended symptom management strategies.   Patient will begin to report improved participation/engagement in desired leisure occupations .   Patient will demonstrate improvements with  and pinch strength to within 25% of their uninvolved side for increased readiness to return to independence in ADLs and IADLs.   Patient will report readiness for discharge from OT services.              Plan  Patient would benefit from: skilled occupational therapy  Referral necessary: Yes  Planned modality interventions: thermotherapy: hydrocollator packs and ultrasound  Other planned modality interventions: IASTM    Planned therapy interventions: IADL retraining, manual therapy, activity modification, fine motor coordination training, flexibility, functional ROM exercises, home exercise program, graded exercise, graded activity, therapeutic exercise, therapeutic activities, stretching, strengthening, patient education, orthotic fitting/training, joint mobilization, orthotic management and training, work reintegration, dressing changes, balance/weight bearing training and ADL retraining    Frequency: 2-3x week  Duration in weeks: 12  Plan of Care beginning date: 1/22/2025  Plan of Care expiration date: 4/25/2025  Treatment plan discussed with: patient      Subjective Evaluation     History of Present Illness  Date of surgery: 1/20/2024  Mechanism of injury: surgery  Mechanism of injury: Pt is a 60 yo RHD male who presents to OT for re-eval s/p R wrist extensor tenolysis, wrist  capsulotomy, and de quervain's release.        Quality of life: good     Patient Goals  Patient goals for therapy: increased strength, return to sport/leisure activities, independence with ADLs/IADLs, increased motion, decreased pain and decreased edema     Pain  Current pain ratin  At best pain ratin  At worst pain rating: 10  Quality: sharp and pressure  Relieving factors: medications       Treatments  Current treatment: occupational therapy    Objective    Tissue Integrity: sutures intact, minimal bleeding, raised, wound is approximated, no sign of infection    Sensation (Ten-Test )  Right Hand (thumb to small finger): 10/10, 7/10, 7/10, 10/10, 8/10  Left Hand (thumb to small finger): 10/10, 10/10, 10/10, 10/10, 10/10      Edema (circumferential) (cm):    Right   2 inch below elbow crease 30   Wrist crease 22.3   Palm 27.2   MCPs 25.4       AROM     Elbow/Forearm   Right   Supination 50   Pronation Full      Wrist   Right   Flexion 20   Extension 27       Right Hand (ext/flex)   D2 D3 D4 D5   MCP -10/30 -10/35 -10/35 -10/30   PIP 60 60 65 55   DIP 23 45 25 34   Hook (DPC) Unable       Full (DPC) Unable       Kapandji Score (Opposition) 0/10              Thumb   Right   MP  30   IP 47   Palmar Abd. 58   Radial Abd. 60          Precautions: R wrist extensor tenolysis, wrist capsulotomy and de quervain's release on     Manuals   re-eval                 STM                  Scar mob              edema                                                         Ther Ex                       wrist, digit PROM                  Forearm, wrist, and digit AROM X20              Tendon glides              Gentle strengthening                                                                                                                                                Ther Activity                   activity modification                                                                                                                      Neuro Re-Ed                                                               Ortho Fit                                                               Modalities                  HP

## 2025-01-23 ENCOUNTER — APPOINTMENT (OUTPATIENT)
Dept: OCCUPATIONAL THERAPY | Age: 60
End: 2025-01-23
Payer: MEDICARE

## 2025-01-24 ENCOUNTER — OFFICE VISIT (OUTPATIENT)
Dept: OCCUPATIONAL THERAPY | Age: 60
End: 2025-01-24
Payer: MEDICARE

## 2025-01-24 ENCOUNTER — TELEPHONE (OUTPATIENT)
Dept: OBGYN CLINIC | Facility: MEDICAL CENTER | Age: 60
End: 2025-01-24

## 2025-01-24 ENCOUNTER — TELEPHONE (OUTPATIENT)
Dept: OBGYN CLINIC | Facility: HOSPITAL | Age: 60
End: 2025-01-24

## 2025-01-24 DIAGNOSIS — Z47.89 AFTERCARE FOLLOWING SURGERY OF THE MUSCULOSKELETAL SYSTEM: ICD-10-CM

## 2025-01-24 DIAGNOSIS — S61.452D ANIMAL BITE OF LEFT HAND WITH INFECTION, SUBSEQUENT ENCOUNTER: ICD-10-CM

## 2025-01-24 DIAGNOSIS — M25.631 WRIST STIFFNESS, RIGHT: ICD-10-CM

## 2025-01-24 DIAGNOSIS — M25.641 STIFFNESS OF FINGER JOINT OF RIGHT HAND: ICD-10-CM

## 2025-01-24 DIAGNOSIS — M65.949 TENOSYNOVITIS OF FINGER AND HAND: Primary | ICD-10-CM

## 2025-01-24 DIAGNOSIS — L08.9 ANIMAL BITE OF LEFT HAND WITH INFECTION, SUBSEQUENT ENCOUNTER: ICD-10-CM

## 2025-01-24 PROCEDURE — 97140 MANUAL THERAPY 1/> REGIONS: CPT

## 2025-01-24 PROCEDURE — 97110 THERAPEUTIC EXERCISES: CPT

## 2025-01-24 NOTE — TELEPHONE ENCOUNTER
Received transfer call from pt, he states he has been having bleeding on and off when doing OT. Today he went to therapy its started bleeding 30 min in and OT tried to stop the bleeding but it has been soaking through 2 sets of bandages today. He wrapped it with xeroform, triple thick gauze this time and an ace wrap. He would like to know how to proceed? Should he be continuing with OT? EPIC chat sent to Dr cMcarthy.

## 2025-01-24 NOTE — PROGRESS NOTES
"Daily Note     Today's date: 2025  Patient name: Yoni Castillo  : 1965  MRN: [id number]  Referring provider: Yesenia Carrasquillo PA-C  Dx:   Encounter Diagnosis     ICD-10-CM    1. Tenosynovitis of finger and hand  M65.949       2. Animal bite of left hand with infection, subsequent encounter  S61.452D     L08.9       3. Aftercare following surgery of the musculoskeletal system  Z47.89       4. Stiffness of finger joint of right hand  M25.641       5. Wrist stiffness, right  M25.631           Start Time: 09  Stop Time: 1030  Total time in clinic (min): 45 minutes    Subjective: \"I soaked it a few times. I've been moving it.\"      Objective: See treatment diary below      Assessment: Tolerated treatment well. Pt with active bleeding today during ROM exercises. Reviewed all precautions and HEPs. Performed wrist and digit AROM and re-wrapped wounds after stopping the bleeding. Patient would benefit from continued OT      Plan: Continue per plan of care.  Progress treatment as tolerated.       Precautions: R wrist extensor tenolysis, wrist capsulotomy and de quervain's release on     Manuals   re-eval                 STM                  Scar mob              edema     15'               Wound care   15'                                 Ther Ex                       wrist, digit PROM    x20              Forearm, wrist, and digit AROM X20  x20            Tendon glides              Gentle strengthening                                                                                                                                                Ther Activity                   activity modification                                                                                                                     Neuro Re-Ed                                                               Ortho Fit                                                               Modalities                  HP                    "

## 2025-01-24 NOTE — TELEPHONE ENCOUNTER
Returned patient's phone call about his incision bleeding.  He states that when he is working with therapy and they are doing some soft tissue massage to mobilize some of his edema he started bleeding from the distal third of the incision on the dorsal hand near the wrist joint.  He had a little bit of bleeding during therapy on Wednesday but this stopped quickly.  However this time it kept oozing.  Denies any pulsatile bleeding.  He has a small trickle of blood from this portion of the incision which has soaked through a one 4 x 4 thick dressing twice.  He has since put on a thicker dressing with a folded up 4 x 4 directly over this area of the incision to provide additional pressure and then applied a Ace wrap over the top.  This was about an hour and a half ago and has not soaked through yet.  He is hopeful that maybe the bleeding is stopped.  He is plan to check it 1 more time tonight for he goes to bed and change new bandage.  We talked about also holding pressure with his other hand to apply 10 minutes of direct pressure over the incision.  He will try this if it is not stop bleeding.  He understands if this continues to bleed through bandages he should present to the emergency department for evaluation and potentially cauterization of a superficial vessel.    In the meantime I recommend holding off on aggressive physical therapy and with the soft tissue mobilization.  He can work on active range of motion as this does not seem to cause bleeding.  His incision just needs to heal a little bit more before he starts to be that aggressive.    Carroll Mccarthy MD

## 2025-01-24 NOTE — TELEPHONE ENCOUNTER
Caller: Ed (patient)    Doctor: Dr. Mccarthy    Reason for call: Surgery: 01/20/25  RELEASE DEQUERVAINS - Right (Right: Hand)       Right wrist and hand extensor tenolysis and wrist capsulotomy (Right: Hand)     Patient is calling because every time he has had therapy his wound bleeds, now today he went and it has not stopped bleeding since 11:00 AM, it is soaking through bandages.     Will warm transfer to Nurse    Call back#: [phone]

## 2025-01-27 ENCOUNTER — OFFICE VISIT (OUTPATIENT)
Dept: OCCUPATIONAL THERAPY | Age: 60
End: 2025-01-27
Payer: MEDICARE

## 2025-01-27 DIAGNOSIS — M25.631 WRIST STIFFNESS, RIGHT: ICD-10-CM

## 2025-01-27 DIAGNOSIS — Z47.89 AFTERCARE FOLLOWING SURGERY OF THE MUSCULOSKELETAL SYSTEM: ICD-10-CM

## 2025-01-27 DIAGNOSIS — M65.949 TENOSYNOVITIS OF FINGER AND HAND: Primary | ICD-10-CM

## 2025-01-27 DIAGNOSIS — S61.452D ANIMAL BITE OF LEFT HAND WITH INFECTION, SUBSEQUENT ENCOUNTER: ICD-10-CM

## 2025-01-27 DIAGNOSIS — L08.9 ANIMAL BITE OF LEFT HAND WITH INFECTION, SUBSEQUENT ENCOUNTER: ICD-10-CM

## 2025-01-27 DIAGNOSIS — M25.641 STIFFNESS OF FINGER JOINT OF RIGHT HAND: ICD-10-CM

## 2025-01-27 PROCEDURE — 97140 MANUAL THERAPY 1/> REGIONS: CPT

## 2025-01-27 PROCEDURE — 97110 THERAPEUTIC EXERCISES: CPT

## 2025-01-27 NOTE — PROGRESS NOTES
"Daily Note     Today's date: 2025  Patient name: Yoni Castillo  : 1965  MRN: [id number]  Referring provider: Yesenia Carrasquillo PA-C  Dx:   Encounter Diagnosis     ICD-10-CM    1. Tenosynovitis of finger and hand  M65.949       2. Animal bite of left hand with infection, subsequent encounter  S61.452D     L08.9       3. Wrist stiffness, right  M25.631       4. Aftercare following surgery of the musculoskeletal system  Z47.89       5. Stiffness of finger joint of right hand  M25.641             Start Time: 1155  Stop Time: 1240  Total time in clinic (min): 45 minutes    Subjective: \"I spoke with the doctor. I'm keeping an eye on the bleeding.\"      Objective: See treatment diary below      Assessment: Tolerated treatment well. Pt with minimal active bleeding/seeping at today's appt. Pt is monitoring his bleeding and response to AROM exercises. Bleeding stops soon after exercises are performed. Finger PROM improved after stretching today. Wrist AROM is improving. Patient would benefit from continued OT      Plan: Continue per plan of care.  Progress treatment as tolerated.       Precautions: R wrist extensor tenolysis, wrist capsulotomy and de quervain's release on     Manuals   re-eval               STM                  Scar mob              edema     15'  10'             Wound care   15'  10'                               Ther Ex                       wrist, digit PROM    x20  3x20            Forearm, wrist, and digit AROM X20  x20 Thumb/finger  AROM/PROM           Tendon glides   Comp fist x20           Gentle strengthening                                                                                                                                                Ther Activity                   activity modification                                                                                                                     Neuro Re-Ed                                               "                 Ortho Fit                                                               Modalities                  HP

## 2025-01-29 ENCOUNTER — APPOINTMENT (OUTPATIENT)
Dept: OCCUPATIONAL THERAPY | Age: 60
End: 2025-01-29
Payer: MEDICARE

## 2025-01-29 NOTE — TELEPHONE ENCOUNTER
Caller: Patient    Doctor: Shari    Reason for call: Patient called stated he is headed to the [facility] since his incision right hand is still bleeding clear red discharge. Please advise.    Call back#: [phone]

## 2025-01-30 ENCOUNTER — OFFICE VISIT (OUTPATIENT)
Dept: OBGYN CLINIC | Facility: MEDICAL CENTER | Age: 60
End: 2025-01-30

## 2025-01-30 VITALS — BODY MASS INDEX: 39.42 KG/M2 | WEIGHT: 291 LBS | HEIGHT: 72 IN

## 2025-01-30 DIAGNOSIS — Z47.89 AFTERCARE FOLLOWING SURGERY OF THE MUSCULOSKELETAL SYSTEM: Primary | ICD-10-CM

## 2025-01-30 PROCEDURE — 99024 POSTOP FOLLOW-UP VISIT: CPT | Performed by: ORTHOPAEDIC SURGERY

## 2025-01-30 NOTE — TELEPHONE ENCOUNTER
Should this pt be scheduled to be seen today, if he can come in? I dont see notes that he went to the ER yesterday as he said he was going to and I see mention of cauterization in the ER in your note so I wasn't sure if you want him to go to the ER instead of an office visit since he is still bleeding. Please advise, thanks!

## 2025-01-30 NOTE — PROGRESS NOTES
HAND & UPPER EXTREMITY OFFICE VISIT   Referred By:  No referring provider defined for this encounter.      Chief Complaint:     Right wrist stiffness and drainage    Surgery:  1/20/2025: Right de Quervain's release, right wrist and hand extensor tenolysis with partial dorsal wrist capsulectomy    History of Present Illness:   Patient presents now 10 days status post the above surgery. Since we last spoke on the phone he reports continued drainage from the dorsal aspect of the hand incision.  He states it is now clear and bloody drainage.  No longer justice bleeding as it was previously when he called after his therapy session.  He states that the drainage has been stopped and he is having changed the bandage multiple times a day.  Says he is making some progress with his range of motion but it slow.  He is trying to do stuff around the house but is still limited.  He is doing twice daily warm soapy soaks.  Denies fevers or chills.    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    COLONOSCOPY      CONTRACTURE Right 1/20/2025    Procedure: Right wrist and hand extensor tenolysis and wrist capsulotomy;  Surgeon: Carroll Mccarthy MD;  [facility];  Service: Orthopedics    FOOT SURGERY  2022    Left Foot    GANGLION CYST EXCISION Left 03/25/2024    Procedure: EXCISION MUCOID CYST, INDEX FINGER DIP;  Surgeon: Carroll Mccarthy MD;  [facility];  Service: Orthopedics    GANGLION CYST EXCISION Right 05/20/2024    Procedure: EXCISION MUCOID CYST - RIGHT INDEX AND MIDDLE FINGERS;  Surgeon: Carroll Mccarthy MD;  [facility];  Service: Orthopedics    HERNIA  REPAIR      INCISION AND DRAINAGE  01/16/2024    KIDNEY SURGERY      KNEE SURGERY  1980    MOUTH SURGERY      DC AMPUTATION METATARSAL W/TOE SINGLE Left 6/1/2022    Procedure: RAY RESECTION FOOT;  Surgeon: Hannah Melgoza DPM;  [facility];  Service: Podiatry    DC INCISION & DRAINAGE ABSCESS COMPLICATED/MULTIPLE Right 09/17/2024    Procedure: Irrigation and debridement right wrist and hand, any indicated procedures;  Surgeon: Carroll Mccarthy MD;  [facility];  Service: Orthopedics    DC INCISION EXTENSOR TENDON SHEATH WRIST Right 1/20/2025    Procedure: RELEASE DEQUERVAINS - Right;  Surgeon: Carroll Mccarthy MD;  [facility];  Service: Orthopedics    DC RPR AA HERNIA 1ST < 3 CM REDUCIBLE N/A 7/14/2023    Procedure: REPAIR HERNIA INCISIONAL;  Surgeon: Matt Melo MD;  [facility];  Service: General    DC SYNOVECTOMY EXTENSOR TENDON SHTH WRIST 1 CMPRT Right 10/03/2024    Procedure: Irrigation and debridement, right wrist, volar and dorsal, possible extensor and flexor tenosynovectomy, any indicated procedures;  Surgeon: Carroll Mccarthy MD;  [facility];  Service: Orthopedics    TONSILLECTOMY  1968    Yes    WOUND DEBRIDEMENT Left 4/28/2022    Procedure: Left foot washout;  Surgeon: Hannah Melgoza DPM;  [facility];  Service: Podiatry    WOUND DEBRIDEMENT Left 6/6/2022    Procedure: DEBRIDEMENT WOUND (WASH OUT);  Surgeon: Hannah Melgoza DPM;  [facility];  Service: Podiatry     Family History   Problem Relation Age of Onset    Diabetes Paternal Grandmother     Diabetes Paternal Grandfather     Arthritis Maternal Grandmother      Social History     Socioeconomic History    Marital status: Registered Domestic Partner     Spouse name: Not on file    Number of children: Not on file    Years of education: Not on file    Highest education level: Not on file   Occupational History    Not on file   Tobacco Use    Smoking status:  Never    Smokeless tobacco: Never   Vaping Use    Vaping status: Never Used   Substance and Sexual Activity    Alcohol use: Yes     Alcohol/week: 1.0 standard drink of alcohol     Types: 1 Cans of beer per week     Comment: ocassional    Drug use: Never    Sexual activity: Not Currently     Partners: Female     Birth control/protection: Abstinence   Other Topics Concern    Not on file   Social History Narrative    Not on file     Social Drivers of Health     Financial Resource Strain: Not on file   Food Insecurity: Patient Declined (11/13/2024)    Hunger Vital Sign     Worried About Running Out of Food in the Last Year: Patient declined     Ran Out of Food in the Last Year: Patient declined   Transportation Needs: Patient Declined (11/13/2024)    PRAPARE - Transportation     Lack of Transportation (Medical): Patient declined     Lack of Transportation (Non-Medical): Patient declined   Physical Activity: Not on file   Stress: Not on file   Social Connections: Not on file   Intimate Partner Violence: Not on file   Housing Stability: Patient Declined (11/13/2024)    Housing Stability Vital Sign     Unable to Pay for Housing in the Last Year: Patient declined     Number of Times Moved in the Last Year: 0     Homeless in the Last Year: Patient declined     Scheduled Meds:  Continuous Infusions:No current facility-administered medications for this visit.    PRN Meds:.  Allergies   Allergen Reactions    Cephalexin Hives     Skin peeling  Tolerates penicillins (tolerated unasyn and zosyn)    Pollen Extract Sneezing    Metformin GI Intolerance       Physical Examination:    Ht 6' (1.829 m)   Wt 132 kg (291 lb)   BMI 39.47 kg/m²     Gen: A&Ox3, NAD    Right Upper Extremity:  Dressing with serosanguineous staining at the central portion of the dorsal incision.  De Quervain's incision well-healed.  The majority of the dorsal incision is well-healed aside from the central portion which is well-approximated but still little bit  .  No expressible fluid.  No areas of fluctuance.  No significant surrounding erythema or signs of infection.  Wrist range of motion 10 degrees extension to 30 degrees flexion  50% composite fist  Warm well-perfused fingers  Sensation intact to light touch in the fingertips, some decrease sensation around the dorsal hand incision    Studies:  No new imaging    Assessment and Plan:  1. Aftercare following surgery of the musculoskeletal system            59 y.o. male presents 10 days status post the above surgery.  He still have a little drainage from the central part of the incision but the remainder of the incision looks well-healed.  The majority of his sutures were removed today except for the 3 stitches near the area of drainage.  No obvious signs of infection.  No apparent fluid collection with hematoma or seroma.  I instructed him to discontinue the soaks.  This may be keeping his incision more open.  Recommend using a bulkier bandage to apply some compression throughout the day and hopefully change it less often.  I think that once his skin heals his serosanguineous drainage will stop.    A new dressing was placed today.  I will see him next week at his regular scheduled postop appointment for repeat evaluation.      he expressed understanding of the plan and agreed. We encouraged them to contact our office with any questions or concerns.         Carroll Mccarthy MD  Hand and Upper Extremity Surgery          *This note was dictated using Dragon voice recognition software. Please excuse any word substitutions or errors.*

## 2025-01-31 ENCOUNTER — APPOINTMENT (OUTPATIENT)
Dept: OCCUPATIONAL THERAPY | Age: 60
End: 2025-01-31
Payer: MEDICARE

## 2025-01-31 DIAGNOSIS — M65.949 TENOSYNOVITIS OF FINGER AND HAND: Primary | ICD-10-CM

## 2025-01-31 DIAGNOSIS — S61.452D ANIMAL BITE OF LEFT HAND WITH INFECTION, SUBSEQUENT ENCOUNTER: ICD-10-CM

## 2025-01-31 DIAGNOSIS — M25.641 STIFFNESS OF FINGER JOINT OF RIGHT HAND: ICD-10-CM

## 2025-01-31 DIAGNOSIS — M25.631 WRIST STIFFNESS, RIGHT: ICD-10-CM

## 2025-01-31 DIAGNOSIS — L08.9 ANIMAL BITE OF LEFT HAND WITH INFECTION, SUBSEQUENT ENCOUNTER: ICD-10-CM

## 2025-01-31 DIAGNOSIS — Z47.89 AFTERCARE FOLLOWING SURGERY OF THE MUSCULOSKELETAL SYSTEM: ICD-10-CM

## 2025-01-31 NOTE — PROGRESS NOTES
"Daily Note     Today's date: 2025  Patient name: Yoni Castillo  : 1965  MRN: [id number]  Referring provider: Yesenia Carrasquillo PA-C  Dx:   Encounter Diagnosis     ICD-10-CM    1. Tenosynovitis of finger and hand  M65.949       2. Animal bite of left hand with infection, subsequent encounter  S61.452D     L08.9       3. Wrist stiffness, right  M25.631       4. Aftercare following surgery of the musculoskeletal system  Z47.89       5. Stiffness of finger joint of right hand  M25.641                        Subjective: \"***.\"      Objective: See treatment diary below      Assessment: Tolerated treatment well. ***. Patient would benefit from continued OT      Plan: Continue per plan of care.  Progress treatment as tolerated.       Precautions: R wrist extensor tenolysis, wrist capsulotomy and de quervain's release on     Manuals   re-eval             STM                  Scar mob              edema     15'  10'  10'           Wound care   15'  10'  10'                             Ther Ex                       wrist, digit PROM    x20  3x20  3x20          Forearm, wrist, and digit AROM X20  x20 Thumb/finger  AROM/PROM Thumb/finger  AROM/PROM          Tendon glides   Comp fist x20 Comp fist x20          Gentle strengthening                                                                                                                                                Ther Activity                   activity modification                                                                                                                     Neuro Re-Ed                                                               Ortho Fit                                                               Modalities                  HP                                              "

## 2025-02-01 PROBLEM — Z01.810 PREOPERATIVE CARDIOVASCULAR EXAMINATION: Status: ACTIVE | Noted: 2024-08-01

## 2025-02-01 PROBLEM — D64.9 ANEMIA: Status: ACTIVE | Noted: 2025-02-01

## 2025-02-01 NOTE — ASSESSMENT & PLAN NOTE
1/21/2024 120/82  1/20/2025 161/92    Carvedilol 25 mg 2 times a day  Lisinopril/hydrochlorothiazide 20/12.5 mg 1 tablet 2 times a day

## 2025-02-01 NOTE — ASSESSMENT & PLAN NOTE
BMI 39.5 --> 38.3  Was on Trulicity but has stopped it  Ask if he was going to resume after his surgery and he said now I will do my weight loss some other way.

## 2025-02-01 NOTE — ASSESSMENT & PLAN NOTE
Referred to cardiology by Adis Hamilton MD preoperative cardiovascular examination for NEPHROLITHOTOMY  PERCUTANEOUS right renal stone.

## 2025-02-03 ENCOUNTER — OFFICE VISIT (OUTPATIENT)
Dept: CARDIOLOGY CLINIC | Facility: CLINIC | Age: 60
End: 2025-02-03
Payer: MEDICARE

## 2025-02-03 ENCOUNTER — OFFICE VISIT (OUTPATIENT)
Dept: OCCUPATIONAL THERAPY | Age: 60
End: 2025-02-03
Payer: MEDICARE

## 2025-02-03 VITALS
HEART RATE: 71 BPM | WEIGHT: 282.4 LBS | HEIGHT: 72 IN | DIASTOLIC BLOOD PRESSURE: 72 MMHG | BODY MASS INDEX: 38.25 KG/M2 | SYSTOLIC BLOOD PRESSURE: 124 MMHG

## 2025-02-03 DIAGNOSIS — E11.22 TYPE 2 DIABETES MELLITUS WITH STAGE 3A CHRONIC KIDNEY DISEASE, WITHOUT LONG-TERM CURRENT USE OF INSULIN (HCC): ICD-10-CM

## 2025-02-03 DIAGNOSIS — L08.9 ANIMAL BITE OF LEFT HAND WITH INFECTION, SUBSEQUENT ENCOUNTER: ICD-10-CM

## 2025-02-03 DIAGNOSIS — E66.01 CLASS 2 SEVERE OBESITY DUE TO EXCESS CALORIES WITH SERIOUS COMORBIDITY IN ADULT, UNSPECIFIED BMI (HCC): ICD-10-CM

## 2025-02-03 DIAGNOSIS — Z01.810 PREOPERATIVE CARDIOVASCULAR EXAMINATION: Primary | ICD-10-CM

## 2025-02-03 DIAGNOSIS — S61.452D ANIMAL BITE OF LEFT HAND WITH INFECTION, SUBSEQUENT ENCOUNTER: ICD-10-CM

## 2025-02-03 DIAGNOSIS — N20.0 RIGHT RENAL STONE: ICD-10-CM

## 2025-02-03 DIAGNOSIS — N18.32 STAGE 3B CHRONIC KIDNEY DISEASE (HCC): ICD-10-CM

## 2025-02-03 DIAGNOSIS — Z01.818 OTHER SPECIFIED PRE-OPERATIVE EXAMINATION: ICD-10-CM

## 2025-02-03 DIAGNOSIS — M25.641 STIFFNESS OF FINGER JOINT OF RIGHT HAND: ICD-10-CM

## 2025-02-03 DIAGNOSIS — D64.9 ANEMIA, UNSPECIFIED TYPE: ICD-10-CM

## 2025-02-03 DIAGNOSIS — Z47.89 AFTERCARE FOLLOWING SURGERY OF THE MUSCULOSKELETAL SYSTEM: ICD-10-CM

## 2025-02-03 DIAGNOSIS — M25.631 WRIST STIFFNESS, RIGHT: ICD-10-CM

## 2025-02-03 DIAGNOSIS — M65.949 TENOSYNOVITIS OF FINGER AND HAND: Primary | ICD-10-CM

## 2025-02-03 DIAGNOSIS — I10 ESSENTIAL HYPERTENSION: ICD-10-CM

## 2025-02-03 DIAGNOSIS — N18.31 TYPE 2 DIABETES MELLITUS WITH STAGE 3A CHRONIC KIDNEY DISEASE, WITHOUT LONG-TERM CURRENT USE OF INSULIN (HCC): ICD-10-CM

## 2025-02-03 DIAGNOSIS — E66.812 CLASS 2 SEVERE OBESITY DUE TO EXCESS CALORIES WITH SERIOUS COMORBIDITY IN ADULT, UNSPECIFIED BMI (HCC): ICD-10-CM

## 2025-02-03 PROCEDURE — 97140 MANUAL THERAPY 1/> REGIONS: CPT

## 2025-02-03 PROCEDURE — 97110 THERAPEUTIC EXERCISES: CPT

## 2025-02-03 PROCEDURE — 99203 OFFICE O/P NEW LOW 30 MIN: CPT | Performed by: INTERNAL MEDICINE

## 2025-02-03 PROCEDURE — 93000 ELECTROCARDIOGRAM COMPLETE: CPT | Performed by: INTERNAL MEDICINE

## 2025-02-03 NOTE — PROGRESS NOTES
"Daily Note     Today's date: 2/3/2025  Patient name: Yoni Castillo  : 1965  MRN: [id number]  Referring provider: Yesenia Carrasquillo PA-C  Dx:   Encounter Diagnosis     ICD-10-CM    1. Tenosynovitis of finger and hand  M65.949       2. Animal bite of left hand with infection, subsequent encounter  S61.452D     L08.9       3. Wrist stiffness, right  M25.631       4. Aftercare following surgery of the musculoskeletal system  Z47.89       5. Stiffness of finger joint of right hand  M25.641             Start Time: 1205  Stop Time: 1248  Total time in clinic (min): 43 minutes    Subjective: \"It stopped bleeding.\"      Objective: See treatment diary below      Assessment: Tolerated treatment well. Pt has improved wound closure today. Sutures at central portion of wound intact. Pt tolerated AROM and gentle digit PROM today. Patient would benefit from continued OT      Plan: Continue per plan of care.  Progress treatment as tolerated.       Precautions: R wrist extensor tenolysis, wrist capsulotomy and de quervain's release on     Manuals   re-eval    2/3           STM                  Scar mob              edema     15'  10'  10'           Wound care   15'  10'  3'                             Ther Ex                       wrist, digit PROM    x20  3x20  3x20          Forearm, wrist, and digit AROM X20  x20 Thumb/finger  AROM/PROM Thumb/finger  AROM/PROM          Tendon glides   Comp fist x20 Comp fist x20          Gentle strengthening                                                                                                                                                Ther Activity                   activity modification                                                                                                                     Neuro Re-Ed                                                               Ortho Fit                                                               Modalities       "            HP

## 2025-02-03 NOTE — ASSESSMENT & PLAN NOTE
Lab Results   Component Value Date    EGFR 50 09/21/2024    EGFR 53 09/20/2024    EGFR 45 09/19/2024    CREATININE 1.47 (H) 09/21/2024    CREATININE 1.41 (H) 09/20/2024    CREATININE 1.60 (H) 09/19/2024

## 2025-02-03 NOTE — PROGRESS NOTES
[facility]  [address] [place] [address]  Phone#  [phone]   Fax#  [phone]  *-*-*-*-*-*-*-*-*-*-*-*-*-*-*-*-*-*-*-*-*-*-*-*-*-*-*-*-*-*-*-*-*-*-*-*-*-*-*-*-*-*-*-*-*-*-*-*-*-*-*-*-*-*                                   Cardiology Follow Up      ENCOUNTER DATE: 25 10:49 AM  PATIENT NAME: Yoni Castillo   : 1965    MRN: [id number]  AGE:59 y.o.      SEX: male  ENCOUNTER PROVIDER:Maurice Mathew MD     PRIMARY CARE PHYSICIAN: Judd Ji MD    ACTIVE DIAGNOSIS AND PLAN    1. Preoperative cardiovascular examination  Assessment & Plan:  Referred to cardiology by Adis Hamilton MD preoperative cardiovascular examination for NEPHROLITHOTOMY  PERCUTANEOUS right renal stone.  Orders:  -     POCT ECG  2. Essential hypertension  Assessment & Plan:  2024 120/82  2025 161/92    Carvedilol 25 mg 2 times a day  Lisinopril/hydrochlorothiazide 20/12.5 mg 1 tablet 2 times a day  3. Class 2 severe obesity due to excess calories with serious comorbidity in adult, unspecified BMI (HCC)  Assessment & Plan:  BMI 39.5 --> 38.3  Was on Trulicity but has stopped it  Ask if he was going to resume after his surgery and he said now I will do my weight loss some other way.      4. Stage 3b chronic kidney disease ([facility])  Assessment & Plan:  Lab Results   Component Value Date    EGFR 50 2024    EGFR 53 2024    EGFR 45 2024    CREATININE 1.47 (H) 2024    CREATININE 1.41 (H) 2024    CREATININE 1.60 (H) 2024     5. Type 2 diabetes mellitus with stage 3a chronic kidney disease, without long-term current use of insulin ([facility])  Assessment & Plan:    Lab Results   Component Value Date    HGBA1C 6.4 10/30/2024     6. Anemia, unspecified type  Assessment & Plan:  Hemoglobin 9.9  7. Other specified pre-operative examination  -     Ambulatory referral to Cardiology  8. Right renal stone  -     Ambulatory referral to Cardiology     INTERVAL HISTORY:         Patient is a 59-year-old male who was referred for preoperative cardiovascular examination percutaneous nephrolithotomy.  He has no cardiac history.  He denies chest discomfort or shortness of breath.  He has no palpitations.  He denies symptoms of dizziness, lightheadedness or near-syncope/syncope.  He denies leg edema.  He denies symptoms of orthopnea or paroxysmal nocturnal dyspnea.  He is a lifetime non-smoker.  He has no family history of heart disease.  Blood pressure is normal.  His cholesterol values are excellent except for a low HDL.      DISCUSSION/PLAN:          Recommend proceeding with surgery as planned  Patient is a low cardiac risk  Return on a as needed basis.    CARDIOLOGY HISTORY AND TESTING  Referred to cardiology by Adis Hamilton MD preoperative cardiovascular examination for NEPHROLITHOTOMY  PERCUTANEOUS right renal stone.    ACTIVE PROBLEM LIST  Patient Active Problem List   Diagnosis    Essential hypertension    Type 2 diabetes mellitus with chronic kidney disease, without long-term current use of insulin ([facility])    Class 2 severe obesity with serious comorbidity in adult ([facility])    Low back pain with sciatica    Cervical radiculopathy    Neuropathy    Chronic kidney disease, stage 3 ([facility])    Ventral hernia without obstruction or gangrene    Incisional hernia without obstruction or gangrene    Acquired absence of other left toe(s) ([facility])    Dupuytren's disease of finger with nodules without contracture    Dog bite    Staghorn calculus    Mucoid cyst of joint    Preoperative cardiovascular examination    Tenosynovitis of finger and hand    Wrist stiffness, right    Stiffness of finger joint of right hand    Anemia       TODAY'S EKG  Results for orders placed or performed in visit on 02/03/25   POCT ECG    Narrative    Normal sinus rhythm at a rate of 71 bpm.  Normal ECG.         Lab Studies:    Lab Results   Component Value Date    CHOLESTEROL 134 06/12/2024    CHOLESTEROL 148 03/06/2024     CHOLESTEROL 135 01/30/2023     Lab Results   Component Value Date    TRIG 109 06/12/2024    TRIG 144 03/06/2024    TRIG 153 (H) 01/30/2023     Lab Results   Component Value Date    HDL 34 (L) 06/12/2024    HDL 38 (L) 03/06/2024    HDL 33 (L) 01/30/2023     Lab Results   Component Value Date    LDLCALC 78 06/12/2024    LDLCALC 81 03/06/2024    LDLCALC 71 01/30/2023     Lab Results   Component Value Date    HGBA1C 6.4 10/30/2024    HGBA1C 8.0 (H) 06/12/2024    HGBA1C 7.3 (H) 03/06/2024      Lab Results   Component Value Date    EGFR 50 09/21/2024    EGFR 53 09/20/2024    EGFR 45 09/19/2024    SODIUM 137 09/21/2024    SODIUM 135 09/20/2024    SODIUM 137 09/19/2024    K 3.8 09/21/2024    K 3.9 09/20/2024    K 4.1 09/19/2024     09/21/2024     09/20/2024     09/19/2024    CO2 27 10/03/2024    CO2 28 09/21/2024    CO2 26 09/20/2024    BUN 23 09/21/2024    BUN 22 09/20/2024    BUN 22 09/19/2024    CREATININE 1.47 (H) 09/21/2024    CREATININE 1.41 (H) 09/20/2024    CREATININE 1.60 (H) 09/19/2024    MG 2.1 09/11/2024    MG 2.3 11/30/2023    MG 2.2 07/25/2022     Lab Results   Component Value Date    WBC 5.68 09/21/2024    WBC 4.80 09/20/2024    WBC 5.65 09/19/2024    HGB 10.9 (L) 10/03/2024    HGB 9.9 (L) 09/21/2024    HGB 10.8 (L) 09/20/2024    HCT 32 (L) 10/03/2024    HCT 30.2 (L) 09/21/2024    HCT 33.1 (L) 09/20/2024    MCV 95 09/21/2024    MCV 97 09/20/2024    MCV 97 09/19/2024    MCH 31.0 09/21/2024    MCH 31.5 09/20/2024    MCH 32.0 09/19/2024    MCHC 32.8 09/21/2024    MCHC 32.6 09/20/2024    MCHC 32.8 09/19/2024     09/21/2024     09/20/2024     09/19/2024      Lab Results   Component Value Date    GLUCOSE 112 10/03/2024    CALCIUM 8.7 09/21/2024    CALCIUM 8.6 09/20/2024    CALCIUM 8.6 09/19/2024    ALB 3.7 09/20/2024    ALB 3.9 09/12/2024    ALB 3.0 (L) 09/12/2024    TP 6.5 09/20/2024    TP 7.1 09/12/2024    TP 5.9 (L) 09/12/2024    AST 18 09/20/2024    AST 13 09/12/2024     AST 12 (L) 09/12/2024    ALT 17 09/20/2024    ALT 14 09/12/2024    ALT 11 09/12/2024    ALKPHOS 45 09/20/2024    ALKPHOS 33 (L) 09/12/2024    ALKPHOS 24 (L) 09/12/2024     Lab Results   Component Value Date    FREET4 0.85 06/16/2021     Lab Results   Component Value Date    DDIMER 0.50 (H) 05/22/2022     Lab Results   Component Value Date    DDIMER 0.50 (H) 05/22/2022     Lab Results   Component Value Date    IRON 88 09/16/2022     Lab Results   Component Value Date    CRP 14.6 (H) 05/31/2022    CRP 18.1 (H) 04/01/2022       Current Outpatient Medications:     Accu-Chek FastClix Lancets MISC, Use to test blood sugar once a day, Disp: 102 each, Rfl: 0    acetaminophen (TYLENOL) 500 mg tablet, Take 2 tablets (1,000 mg total) by mouth every 8 (eight) hours as needed for mild pain or moderate pain, Disp: 60 tablet, Rfl: 0    Acetylcysteine (NAC PO), Take 300 mg by mouth 2 (two) times a day, Disp: , Rfl:     Alpha-Lipoic Acid 600 MG CAPS, Take 600 mg by mouth 2 (two) times a day  , Disp: , Rfl:     ammonium lactate (LAC-HYDRIN) 12 % lotion, if needed, Disp: , Rfl:     Apple Cider Vinegar 300 MG TABS, Take 300 mg by mouth 2 (two) times a day, Disp: , Rfl:     Ascorbic Acid (vitamin C) 1000 MG tablet, Take 1,000 mg by mouth 2 (two) times a day 2 tablet twice a day, Disp: , Rfl:     BENFOTIAMINE PO, Take 300 mg by mouth 2 (two) times a day, Disp: , Rfl:     beta carotene 30 MG capsule, Take 30 mg by mouth 2 (two) times a day  , Disp: , Rfl:     Bismuth Tribromoph-Petrolatum (Xeroform Oil Emulsion Strip) MISC, Apply topically 3 (three) times a day as needed (As needed for dressing changes), Disp: 50 each, Rfl: 0    Blood Glucose Monitoring Suppl (Accu-Chek Guide Me) w/Device KIT, Use to check blood sugar once a day, Disp: 1 kit, Rfl: 0    carvedilol (COREG) 25 mg tablet, Take 1 tablet (25 mg total) by mouth 2 (two) times a day with meals, Disp: 200 tablet, Rfl: 1    chlorhexidine gluconate (HIBICLENS) 4 % external liquid,  Apply 1 Application topically daily as needed for wound care, Disp: 118 mL, Rfl: 0    Chromium Picolinate 200 MCG TABS, Take 200 mcg by mouth 2 (two) times a day, Disp: , Rfl:     docusate sodium (COLACE) 100 mg capsule, Take 1 capsule (100 mg total) by mouth daily as needed for constipation, Disp: 10 capsule, Rfl: 0    dulaglutide (Trulicity) 0.75 MG/0.5ML injection, Inject 0.5 mL (0.75 mg total) under the skin every 7 days, Disp: 2 mL, Rfl: 5    Empagliflozin (Jardiance) 25 MG TABS, Take 1 tablet (25 mg total) by mouth daily, Disp: 90 tablet, Rfl: 1    Garlic 1200 MG CAPS, Take by mouth 2 (two) times a day , Disp: , Rfl:     glucose blood (Accu-Chek Guide) test strip, Use to check blood sugar once a day, Disp: 100 strip, Rfl: 0    IRON, FERROUS SULFATE, PO, Take 25 mg by mouth 2 (two) times a day, Disp: , Rfl:     ketoconazole (NIZORAL) 2 % cream, if needed, Disp: , Rfl:     ketorolac (ACULAR) 0.5 % ophthalmic solution, , Disp: , Rfl:     lidocaine (Lidoderm) 5 %, Apply 1 patch topically over 12 hours daily Remove & Discard patch within 12 hours or as directed by MD, Disp: 30 patch, Rfl: 5    lisinopril-hydrochlorothiazide (PRINZIDE,ZESTORETIC) 20-12.5 MG per tablet, Take 1 tablet by mouth 2 (two) times a day, Disp: 200 tablet, Rfl: 1    Lutein-Bilberry (LUTEIN PLUS BILBERRY PO), Take by mouth 2 (two) times a day, Disp: , Rfl:     methocarbamol (ROBAXIN) 750 mg tablet, Take 2 tablets (1,500 mg total) by mouth every 8 (eight) hours, Disp: 30 tablet, Rfl: 0    mupirocin (BACTROBAN) 2 % ointment, Apply topically 3 (three) times a day Apply to site of dog bite 3 times daily, Disp: 15 g, Rfl: 1    NON FORMULARY, Take 150 mg by mouth 2 (two) times a day BLUEBERRY, Disp: , Rfl:     ondansetron (ZOFRAN) 4 mg tablet, Take 1 tablet (4 mg total) by mouth every 8 (eight) hours as needed for nausea or vomiting, Disp: 20 tablet, Rfl: 0    oxyCODONE (Roxicodone) 5 immediate release tablet, Take 1 tablet (5 mg total) by mouth  every 6 (six) hours as needed for severe pain Max Daily Amount: 20 mg, Disp: 15 tablet, Rfl: 0    Pyridoxine HCl (B-6 PO), Take by mouth 2 (two) times a day, Disp: , Rfl:     TART CHERRY PO, Take 150 mg by mouth 2 (two) times a day, Disp: , Rfl:     traMADol (Ultram) 50 mg tablet, Take 2 tablets (100 mg total) by mouth daily at bedtime as needed for severe pain, Disp: 60 tablet, Rfl: 0    Turmeric (QC TUMERIC COMPLEX PO), Take 1,000 mg by mouth 2 (two) times a day Tumeric /curcimin, Disp: , Rfl:     VITAMIN D PO, Take by mouth 2 (two) times a day, Disp: , Rfl:     vitamin E, tocopherol, 400 units capsule, Take 400 Units by mouth 2 (two) times a day, Disp: , Rfl:     Zinc 50 MG CAPS, Take by mouth 2 (two) times a day , Disp: , Rfl:   Allergies   Allergen Reactions    Cephalexin Hives     Skin peeling  Tolerates penicillins (tolerated unasyn and zosyn)    Pollen Extract Sneezing    Metformin GI Intolerance       Past Medical History:   Diagnosis Date    Arthritis 2012    Chronic kidney disease 2012    Chronic pain disorder     Diabetes mellitus (HCC)     H/O eye surgery     High blood sugar     Hypertension     Kidney stone     Liver disease     Neuropathy in diabetes (HCC)      Social History     Socioeconomic History    Marital status: Registered Domestic Partner     Spouse name: Not on file    Number of children: Not on file    Years of education: Not on file    Highest education level: Not on file   Occupational History    Not on file   Tobacco Use    Smoking status: Never    Smokeless tobacco: Never   Vaping Use    Vaping status: Never Used   Substance and Sexual Activity    Alcohol use: Yes     Alcohol/week: 1.0 standard drink of alcohol     Types: 1 Cans of beer per week     Comment: ocassional    Drug use: Never    Sexual activity: Not Currently     Partners: Female     Birth control/protection: Abstinence   Other Topics Concern    Not on file   Social History Narrative    Not on file     Social Drivers of  Health     Financial Resource Strain: Not on file   Food Insecurity: Patient Declined (11/13/2024)    Hunger Vital Sign     Worried About Running Out of Food in the Last Year: Patient declined     Ran Out of Food in the Last Year: Patient declined   Transportation Needs: Patient Declined (11/13/2024)    PRAPARE - Transportation     Lack of Transportation (Medical): Patient declined     Lack of Transportation (Non-Medical): Patient declined   Physical Activity: Not on file   Stress: Not on file   Social Connections: Not on file   Intimate Partner Violence: Not on file   Housing Stability: Patient Declined (11/13/2024)    Housing Stability Vital Sign     Unable to Pay for Housing in the Last Year: Patient declined     Number of Times Moved in the Last Year: 0     Homeless in the Last Year: Patient declined      Family History   Problem Relation Age of Onset    Diabetes Paternal Grandmother     Diabetes Paternal Grandfather     Arthritis Maternal Grandmother      Past Surgical History:   Procedure Laterality Date    AMPUTATION  2022    Little toe    COLONOSCOPY      CONTRACTURE Right 1/20/2025    Procedure: Right wrist and hand extensor tenolysis and wrist capsulotomy;  Surgeon: Carroll Mccarthy MD;  Location: [facility];  Service: Orthopedics    FOOT SURGERY  2022    Left Foot    GANGLION CYST EXCISION Left 03/25/2024    Procedure: EXCISION MUCOID CYST, INDEX FINGER DIP;  Surgeon: Carroll Mccarthy MD;  [facility];  Service: Orthopedics    GANGLION CYST EXCISION Right 05/20/2024    Procedure: EXCISION MUCOID CYST - RIGHT INDEX AND MIDDLE FINGERS;  Surgeon: Carroll Mccarthy MD;  [facility];  Service: Orthopedics    HERNIA REPAIR      INCISION AND DRAINAGE  01/16/2024    KIDNEY SURGERY      KNEE SURGERY  1980    MOUTH SURGERY      TN AMPUTATION METATARSAL W/TOE SINGLE Left 6/1/2022    Procedure: RAY RESECTION FOOT;  Surgeon: Hannah Melgoza DPM;  [facility];  Service:  Podiatry    AZ INCISION & DRAINAGE ABSCESS COMPLICATED/MULTIPLE Right 09/17/2024    Procedure: Irrigation and debridement right wrist and hand, any indicated procedures;  Surgeon: Carroll Mccarthy MD;  [facility];  Service: Orthopedics    AZ INCISION EXTENSOR TENDON SHEATH WRIST Right 1/20/2025    Procedure: RELEASE DEQUERVAINS - Right;  Surgeon: Carroll Mccarthy MD;  [facility];  Service: Orthopedics    AZ RPR AA HERNIA 1ST < 3 CM REDUCIBLE N/A 7/14/2023    Procedure: REPAIR HERNIA INCISIONAL;  Surgeon: Matt Melo MD;  [facility];  Service: General    AZ SYNOVECTOMY EXTENSOR TENDON SHTH WRIST 1 CMPRT Right 10/03/2024    Procedure: Irrigation and debridement, right wrist, volar and dorsal, possible extensor and flexor tenosynovectomy, any indicated procedures;  Surgeon: Carroll Mccarthy MD;  [facility];  Service: Orthopedics    TONSILLECTOMY  1968    Yes    WOUND DEBRIDEMENT Left 4/28/2022    Procedure: Left foot washout;  Surgeon: Hannah Melgoza DPM;  [facility];  Service: Podiatry    WOUND DEBRIDEMENT Left 6/6/2022    Procedure: DEBRIDEMENT WOUND (WASH OUT);  Surgeon: Hannah Melgoza DPM;  [facility];  Service: Podiatry       PREVIOUS WEIGHTS:   Wt Readings from Last 10 Encounters:   02/03/25 128 kg (282 lb 6.4 oz)   01/30/25 132 kg (291 lb)   01/20/25 132 kg (291 lb 12.8 oz)   11/21/24 129 kg (284 lb)   11/14/24 127 kg (279 lb)   11/13/24 127 kg (279 lb 12.8 oz)   11/01/24 120 kg (265 lb)   10/31/24 120 kg (265 lb)   10/30/24 120 kg (265 lb)   10/17/24 125 kg (275 lb)        Review of Systems:  Review of Systems   Constitutional: Negative.    HENT: Negative.     Eyes: Negative.    Respiratory:  Negative for cough, choking, chest tightness, shortness of breath and wheezing.    Cardiovascular:  Negative for chest pain, palpitations and leg swelling.   Gastrointestinal: Negative.    Endocrine: Negative.    Musculoskeletal:  "Negative.    Skin: Negative.    Allergic/Immunologic: Negative.    Neurological: Negative.    Hematological: Negative.    Psychiatric/Behavioral: Negative.         Physical Exam:  /72   Pulse 71   Ht 6' (1.829 m)   Wt 128 kg (282 lb 6.4 oz)   BMI 38.30 kg/m²     Physical Exam  Vitals reviewed.   Constitutional:       General: He is not in acute distress.     Appearance: He is well-developed.   HENT:      Head: Normocephalic and atraumatic.   Neck:      Thyroid: No thyromegaly.      Vascular: No carotid bruit or JVD.      Trachea: No tracheal deviation.   Cardiovascular:      Rate and Rhythm: Normal rate and regular rhythm.      Pulses: Normal pulses.      Heart sounds: Normal heart sounds. No murmur heard.     No friction rub. No gallop.   Pulmonary:      Effort: Pulmonary effort is normal. No respiratory distress.      Breath sounds: Normal breath sounds. No wheezing, rhonchi or rales.   Chest:      Chest wall: No tenderness.   Abdominal:      General: Bowel sounds are normal. There is no distension.      Palpations: Abdomen is soft.      Tenderness: There is no abdominal tenderness.   Musculoskeletal:      Cervical back: Normal range of motion and neck supple.      Right lower leg: No edema.      Left lower leg: No edema.   Skin:     General: Skin is warm and dry.   Neurological:      General: No focal deficit present.      Mental Status: He is alert and oriented to person, place, and time.      Gait: Gait normal.   Psychiatric:         Mood and Affect: Mood normal.         Behavior: Behavior normal.         Thought Content: Thought content normal.         Judgment: Judgment normal.       ======================================================  Imaging:   Results Review Statement: No pertinent imaging studies reviewed.    Portions of the record may have been created with voice recognition software. Occasional wrong word or \"sound a like\" substitutions may have occurred due to the inherent limitations of " voice recognition software. Read the chart carefully and recognize, using context, where substitutions have occurred.    SIGNATURES:   Maurice Mathew MD

## 2025-02-04 ENCOUNTER — TELEPHONE (OUTPATIENT)
Dept: NEUROLOGY | Facility: CLINIC | Age: 60
End: 2025-02-04

## 2025-02-04 ENCOUNTER — OFFICE VISIT (OUTPATIENT)
Dept: OBGYN CLINIC | Facility: MEDICAL CENTER | Age: 60
End: 2025-02-04

## 2025-02-04 VITALS — HEIGHT: 72 IN | BODY MASS INDEX: 38.19 KG/M2 | WEIGHT: 282 LBS

## 2025-02-04 DIAGNOSIS — M65.949 TENOSYNOVITIS OF FINGER AND HAND: ICD-10-CM

## 2025-02-04 DIAGNOSIS — Z47.89 AFTERCARE FOLLOWING SURGERY OF THE MUSCULOSKELETAL SYSTEM: Primary | ICD-10-CM

## 2025-02-04 DIAGNOSIS — Z47.89 AFTERCARE FOLLOWING SURGERY OF THE MUSCULOSKELETAL SYSTEM: ICD-10-CM

## 2025-02-04 DIAGNOSIS — M25.631 WRIST STIFFNESS, RIGHT: ICD-10-CM

## 2025-02-04 DIAGNOSIS — M25.641 STIFFNESS OF FINGER JOINT OF RIGHT HAND: ICD-10-CM

## 2025-02-04 PROCEDURE — 99024 POSTOP FOLLOW-UP VISIT: CPT | Performed by: ORTHOPAEDIC SURGERY

## 2025-02-04 NOTE — PROGRESS NOTES
HAND & UPPER EXTREMITY OFFICE VISIT   Referred By:  No referring provider defined for this encounter.      Chief Complaint:     Right wrist drainage    Surgery:  Surgery Date: 1/20/2025 - RELEASE DEQUERVAINS - Right - Right and Right wrist and hand extensor tenolysis and wrist capsulotomy - Right     History of Present Illness:   Patient presents now 2 weeks status post the above surgery. he reports that he feels he is improving, but at a slow rate. He states that he has discontinued the warm soapy soaks at this time to allow the wound to heal. He states that there is minimal drainage but is unsure if it is less then the previous visit. He states that he has been doing the range of motion exercises, focusing on making a fist. He does note increased pain along the ulnar aspect of the wrist after lifting a heavy object or prolonged use. He denies any numbness or tingling at this time.    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    COLONOSCOPY      CONTRACTURE Right 1/20/2025    Procedure: Right wrist and hand extensor tenolysis and wrist capsulotomy;  Surgeon: Carroll Mccarthy MD;  Location: [facility];  Service: Orthopedics    FOOT SURGERY  2022    Left Foot    GANGLION CYST EXCISION Left 03/25/2024    Procedure: EXCISION MUCOID CYST, INDEX FINGER DIP;  Surgeon: Carroll Mccarthy MD;  [facility];  Service: Orthopedics    GANGLION CYST EXCISION Right 05/20/2024    Procedure: EXCISION MUCOID CYST - RIGHT INDEX AND MIDDLE FINGERS;  Surgeon: Carroll Mccarthy MD;  [facility];  Service: Orthopedics    HERNIA REPAIR      INCISION AND DRAINAGE  01/16/2024     KIDNEY SURGERY      KNEE SURGERY  1980    MOUTH SURGERY      PA AMPUTATION METATARSAL W/TOE SINGLE Left 6/1/2022    Procedure: RAY RESECTION FOOT;  Surgeon: Hannah Melgoza DPM;  Location[facility];  Service: Podiatry    PA INCISION & DRAINAGE ABSCESS COMPLICATED/MULTIPLE Right 09/17/2024    Procedure: Irrigation and debridement right wrist and hand, any indicated procedures;  Surgeon: Carroll Mccarthy MD;  [facility];  Service: Orthopedics    PA INCISION EXTENSOR TENDON SHEATH WRIST Right 1/20/2025    Procedure: RELEASE DEQUERVAINS - Right;  Surgeon: Carroll Mccarthy MD;  [facility];  Service: Orthopedics    PA RPR AA HERNIA 1ST < 3 CM REDUCIBLE N/A 7/14/2023    Procedure: REPAIR HERNIA INCISIONAL;  Surgeon: Matt Melo MD;  [facility];  Service: General    PA SYNOVECTOMY EXTENSOR TENDON SHTH WRIST 1 CMPRT Right 10/03/2024    Procedure: Irrigation and debridement, right wrist, volar and dorsal, possible extensor and flexor tenosynovectomy, any indicated procedures;  Surgeon: Carroll Mccarthy MD;  [facility];  Service: Orthopedics    TONSILLECTOMY  1968    Yes    WOUND DEBRIDEMENT Left 4/28/2022    Procedure: Left foot washout;  Surgeon: Hannah Melgoza DPM;  [facility];  Service: Podiatry    WOUND DEBRIDEMENT Left 6/6/2022    Procedure: DEBRIDEMENT WOUND (WASH OUT);  Surgeon: Hannah Melgoza DPM;  [facility];  Service: Podiatry     Family History   Problem Relation Age of Onset    Diabetes Paternal Grandmother     Diabetes Paternal Grandfather     Arthritis Maternal Grandmother      Social History     Socioeconomic History    Marital status: Registered Domestic Partner     Spouse name: Not on file    Number of children: Not on file    Years of education: Not on file    Highest education level: Not on file   Occupational History    Not on file   Tobacco Use    Smoking status: Never    Smokeless tobacco: Never   Vaping Use     Vaping status: Never Used   Substance and Sexual Activity    Alcohol use: Yes     Alcohol/week: 1.0 standard drink of alcohol     Types: 1 Cans of beer per week     Comment: ocassional    Drug use: Never    Sexual activity: Not Currently     Partners: Female     Birth control/protection: Abstinence   Other Topics Concern    Not on file   Social History Narrative    Not on file     Social Drivers of Health     Financial Resource Strain: Not on file   Food Insecurity: Patient Declined (11/13/2024)    Hunger Vital Sign     Worried About Running Out of Food in the Last Year: Patient declined     Ran Out of Food in the Last Year: Patient declined   Transportation Needs: Patient Declined (11/13/2024)    PRAPARE - Transportation     Lack of Transportation (Medical): Patient declined     Lack of Transportation (Non-Medical): Patient declined   Physical Activity: Not on file   Stress: Not on file   Social Connections: Not on file   Intimate Partner Violence: Not on file   Housing Stability: Patient Declined (11/13/2024)    Housing Stability Vital Sign     Unable to Pay for Housing in the Last Year: Patient declined     Number of Times Moved in the Last Year: 0     Homeless in the Last Year: Patient declined     Scheduled Meds:  Continuous Infusions:No current facility-administered medications for this visit.    PRN Meds:.  Allergies   Allergen Reactions    Cephalexin Hives     Skin peeling  Tolerates penicillins (tolerated unasyn and zosyn)    Pollen Extract Sneezing    Metformin GI Intolerance       Physical Examination:    Ht 6' (1.829 m)   Wt 128 kg (282 lb)   BMI 38.25 kg/m²     Gen: A&Ox3, NAD    Right Upper Extremity:  Dressing with serosanguineous staining at the central portion of the dorsal incision. De Quervain's incision well-healed. The majority of the dorsal incision is well-healed aside from the central portion which is well-approximated but still little bit . No expressible fluid. No significant  surrounding erythema or signs of infection.   Remaining Sutures intact  Swelling Positive  Wrist ROM full passive wrist motion with 30 degrees of active finger flexion  50% composite fist  Sensation intact to light touch in the axillary median, ulnar, and radial nerve distributions  5/5 motor   Warm, well-perfused digits  Cap refill <2s      Studies:  No new imaging available.     Assessment and Plan:  1. Aftercare following surgery of the musculoskeletal system            59 y.o. male presents 15 days status post RELEASE DEQUERVAINS - Right - Right and Right wrist and hand extensor tenolysis and wrist capsulotomy - Right. I would like to leave the sutures in for an additional week as his incision is not fully healed. A new dressing was placed on the incision at today's visit with xeroform and webroll. I also provided additional supplies for him to change the dressing at home. Recommend continued gentle ROM exercises focusing on regaining finger flexion.    It is recommended he return to the office in 1 week, or sooner should symptoms worsen    he expressed understanding of the plan and agreed. We encouraged them to contact our office with any questions or concerns.         Carroll Mccarthy MD  Hand and Upper Extremity Surgery          *This note was dictated using Dragon voice recognition software. Please excuse any word substitutions or errors.*      Scribe Attestation      I,:  Gato Alvarado am acting as a scribe while in the presence of the attending physician.:       I,:  Carroll Mccarthy MD personally performed the services described in this documentation    as scribed in my presence.:

## 2025-02-05 ENCOUNTER — OFFICE VISIT (OUTPATIENT)
Dept: OCCUPATIONAL THERAPY | Age: 60
End: 2025-02-05
Payer: MEDICARE

## 2025-02-05 DIAGNOSIS — M25.641 STIFFNESS OF FINGER JOINT OF RIGHT HAND: ICD-10-CM

## 2025-02-05 DIAGNOSIS — L08.9 ANIMAL BITE OF LEFT HAND WITH INFECTION, SUBSEQUENT ENCOUNTER: ICD-10-CM

## 2025-02-05 DIAGNOSIS — S61.452D ANIMAL BITE OF LEFT HAND WITH INFECTION, SUBSEQUENT ENCOUNTER: ICD-10-CM

## 2025-02-05 DIAGNOSIS — Z47.89 AFTERCARE FOLLOWING SURGERY OF THE MUSCULOSKELETAL SYSTEM: ICD-10-CM

## 2025-02-05 DIAGNOSIS — M51.369 DDD (DEGENERATIVE DISC DISEASE), LUMBAR: ICD-10-CM

## 2025-02-05 DIAGNOSIS — M25.631 WRIST STIFFNESS, RIGHT: ICD-10-CM

## 2025-02-05 DIAGNOSIS — M65.949 TENOSYNOVITIS OF FINGER AND HAND: Primary | ICD-10-CM

## 2025-02-05 PROCEDURE — 97110 THERAPEUTIC EXERCISES: CPT

## 2025-02-05 PROCEDURE — 97140 MANUAL THERAPY 1/> REGIONS: CPT

## 2025-02-05 RX ORDER — OXYCODONE HYDROCHLORIDE 5 MG/1
5 TABLET ORAL EVERY 6 HOURS PRN
Qty: 15 TABLET | Refills: 0 | OUTPATIENT
Start: 2025-02-05

## 2025-02-05 NOTE — PROGRESS NOTES
"Daily Note     Today's date: 2025  Patient name: Yoni Castillo  : 1965  MRN: [id number]  Referring provider: Yesenia Carrasquillo PA-C  Dx:   Encounter Diagnosis     ICD-10-CM    1. Tenosynovitis of finger and hand  M65.949       2. Animal bite of left hand with infection, subsequent encounter  S61.452D     L08.9       3. Wrist stiffness, right  M25.631       4. Aftercare following surgery of the musculoskeletal system  Z47.89       5. Stiffness of finger joint of right hand  M25.641             Start Time: 1110  Stop Time: 1150  Total time in clinic (min): 40 minutes    Subjective: \"I get my stitches out next week.\"      Objective: See treatment diary below    Wrist    Right   Flexion 25 (was 20)   Extension 36 (was 27)     Assessment: Tolerated treatment well. Pt's wound continues to close. Had f/u with MD and pt will follow up next week to hopefully remove the last of the stitches if the wound is fully closed. Pt continues to make slow going improvements with AROM. Reviewed PROM techniques for composite finger flexion. Patient would benefit from continued OT      Plan: Continue per plan of care.  Progress treatment as tolerated.       Precautions: R wrist extensor tenolysis, wrist capsulotomy and de quervain's release on     Manuals   re-eval    2/3  2/         STM                  Scar mob              edema     15'  10'  10'  10'         Wound care   15'  10'  3'                             Ther Ex                       wrist, digit PROM    x20  3x20  3x20  3x20        Forearm, wrist, and digit AROM X20  x20 Thumb/finger  AROM/PROM Thumb/finger  AROM/PROM  Thumb/finger  AROM/PROM        Tendon glides   Comp fist x20 Comp fist x20  Comp fist 2x20        Gentle strengthening              Intrinsic stretches     X20 hook fist with finger spacers                                                                                                                            Ther Activity          "          activity modification                                                                                                                     Neuro Re-Ed                                                               Ortho Fit                                                               Modalities                  HP

## 2025-02-06 RX ORDER — METHOCARBAMOL 750 MG/1
1500 TABLET, FILM COATED ORAL EVERY 8 HOURS SCHEDULED
Qty: 30 TABLET | Refills: 0 | Status: SHIPPED | OUTPATIENT
Start: 2025-02-06

## 2025-02-07 ENCOUNTER — APPOINTMENT (OUTPATIENT)
Dept: OCCUPATIONAL THERAPY | Age: 60
End: 2025-02-07
Payer: MEDICARE

## 2025-02-10 ENCOUNTER — OFFICE VISIT (OUTPATIENT)
Dept: OCCUPATIONAL THERAPY | Age: 60
End: 2025-02-10
Payer: MEDICARE

## 2025-02-10 DIAGNOSIS — M65.949 TENOSYNOVITIS OF FINGER AND HAND: Primary | ICD-10-CM

## 2025-02-10 DIAGNOSIS — Z47.89 AFTERCARE FOLLOWING SURGERY OF THE MUSCULOSKELETAL SYSTEM: ICD-10-CM

## 2025-02-10 DIAGNOSIS — M25.641 STIFFNESS OF FINGER JOINT OF RIGHT HAND: ICD-10-CM

## 2025-02-10 DIAGNOSIS — L08.9 ANIMAL BITE OF LEFT HAND WITH INFECTION, SUBSEQUENT ENCOUNTER: ICD-10-CM

## 2025-02-10 DIAGNOSIS — M25.631 WRIST STIFFNESS, RIGHT: ICD-10-CM

## 2025-02-10 DIAGNOSIS — S61.452D ANIMAL BITE OF LEFT HAND WITH INFECTION, SUBSEQUENT ENCOUNTER: ICD-10-CM

## 2025-02-10 PROCEDURE — 97110 THERAPEUTIC EXERCISES: CPT

## 2025-02-10 PROCEDURE — 97140 MANUAL THERAPY 1/> REGIONS: CPT

## 2025-02-10 NOTE — PROGRESS NOTES
Name: Yoni Castillo      : 1965      MRN: [id number]  Encounter Provider: JAN Cruz  Encounter Date: 2025   Encounter department: NEUROLOGY [facility]  :  Assessment & Plan  Chronic migraine without aura without status migrainosus, not intractable  He reports history of migraine headaches for about 40 years now but they have worsened in frequency and intensity in the past few months.  He reports increase in stress recently with his dogs passing away and his mother in law being sick in the hospital. He is scheduled for an upcoming surgery with urology for nephrolithotomy for a large kidney stone.  He reports never being worked up or treated for his headaches in the past.  He did see someone from pain management in regards to his chronic neck and back pain who recommended injections but he prefers to avoid them if possible.  He reports that the pain he experiences migrates but that it tends to be in the temporal and occipital areas.  He denies aura but does describe seeing stars in his vision which can change sizes and colors that are only present when his eyes are closed and doesn't necessarily correlate with headaches.  Headaches are made worse with movement and he will retreat to a dark quiet room when he experiences the headaches.  We discussed preventative options including amitriptyline. I would prefer to avoid propranolol since he is on 2 BP agents and avoid topamax due to his history of kidney stones and current kidney stone.  For abortive options, tylenol doesn't seem to do much. I would like to avoid triptans if possible secondary to his somewhat uncontrolled hypertension. He describes snoring and sometimes waking up with headaches. May have had some witnessed apneas.  Had a sleep stufy many years ago but not recently, so I am recommending evaluation.   Workup:  Due to increased frequency and severity of headaches and migraines I recommend further evaluation  with MRI brain without contrast to rule out structural or treatable causes of symptoms. He reports needing anesthesia to undergo an MRI secondary to pain and claustrophobia  Sleep study to assess for JEWEL  Preventative:  We discussed headache hygiene and lifestyle factors that may improve headaches  Start amitriptyline 10mg nightly x 1 week.  Then increase by 10mg weekly until you reach good headache control or you reach a max dosage of 50mg nightly.  Remain on the lowest effective dose    Currently on through other providers: carvedilol, lisinopril-hydrochlorothiazide   Past/ failed/contraindicated: propranolol (on other BP meds), topamax (kidney stones)  CGRP med, botox  Acute:  Discussed not taking over-the-counter or prescription pain medications more than 3 days per week to prevent medication overuse/rebound headache  Start Imitrex 100mg at the earliest onset of a migraine.  May repeat again in 2 hours if not completely headache free. No more than 2 tabs in 24 hours  Currently on through other providers: robaxin, tramadol   Past/ failed/contraindicated: tylenol   Future options:   ubrelvy, reyvow    Orders:    MRI brain without contrast; Future    amitriptyline (ELAVIL) 10 mg tablet; start 10mg at bedtime. Increase by 10mg each week until good effect on headaches/pain or reach 50mg daily    rimegepant sulfate (Nurtec) 75 mg TBDP; Take one NURTEC 75 mg at onset under tongue. Limit 1 in 24 hours. 8 a month.    Chronic nonintractable headache, unspecified headache type    Orders:    Ambulatory Referral to Neurology    Snoring    Orders:    Ambulatory referral to Sleep Medicine; Future          History of Present Illness     We had the pleasure of evaluating Yoni in neurological consultation today. He is a 59 y.o. year-old male who presents today for evaluation of headaches.     He reports history of migraine headaches for about 40 years now but they have worsened in frequency and intensity in the past few months.   He reports increase in stress recently with his dogs passing away and his mother in law being sick in the hospital. He is scheduled for an upcoming surgery with urology for nephrolithotomy for a large kidney stone.  He reports never being worked up or treated for his headaches in the past.  He did see someone from pain management in regards to his chronic neck and back pain who recommended injections but he prefers to avoid them if possible.  He reports that the pain he experiences migrates but that it tends to be in the temporal and occipital areas.  He denies aura but does describe seeing stars in his vision which can change sizes and colors that are only present when his eyes are closed and doesn't necessarily correlate with headaches.  Headaches are made worse with movement and he will retreat to a dark quiet room when he experiences the headaches.      Headaches started at what age? 18s years old  How often do the headaches occur? 2-3x per week  What time of the day do the headaches start?  No particular time of day   How long do the headaches last? 3-4 hours  Are you ever headache free? Yes    Aura? without aura     Last eye exam: several eye surgeries in the past- cataract surgeries     Where is your headache located and pain quality? Bilateral temples and bilateral occiput.  Typically unilateral but switches sides  What is the intensity of pain? Average: 5-6/10, worst 10/10    Associated symptoms:   [x] Nausea       [x] Vomiting        [] Diarrhea  [x] Insomnia    [x] Stiff or sore neck   [] Problems with concentration  [x] Photophobia     [x]Phonophobia      [] Osmophobia  [] Blurred vision   [] Prefer quiet, dark room  [] Light-headed or dizzy     [] Tinnitus   [] Hands or feet tingle or feel numb/paresthesias    [] Ptosis      [] Facial droop  [x] Lacrimation  [] Nasal congestion/rhinorrhea   [] Flushing of face    Things that make the headache worse? Any movement    Headache triggers:  stress, weather  "changes, sunlight    Have you seen someone else for headaches or pain? Yes, Dr. Salcedo (pain mgmt for neck and back)  Have you had trigger point injection performed and how often? No  Have you had Botox injection performed and how often? No   Have you had epidural injections or transforaminal injections performed? No  Have you ever had any Brain imaging? yes [facility] 2022    LIFESTYLE  Sleep   Averages: 1.5-2 hours at a time. Tries to shoot for a total of 8 hours but rarely hits that goal  Problems falling asleep?: No  Problems staying asleep?: Yes  Do you snore while asleep? Yes  Do you wake up with headaches? Yes  Ever evaluated for sleep apnea? \"A long time ago\"   Physical activity: none regularly  Water: 80-100oz per day  Caffeine: 40oz daily strong coffee per day  Mood: reports having some anxiety and depression but he denies any SI/HI  Pertinent family history:  Family history of headaches:  no known family members with significant headaches  Any family history of aneurysms - No    Pertinent social history:  Work: disabled   Lives with lives with their spouse  Illicit Drugs: denies  Alcohol/tobacco: Denies alcohol use, Denies tobacco use     What medications do you take or have you taken for your headaches?:    ABORTIVE:    OTC medications: tylenol (ineffective)  Prescription: none  Medications from other providers: robaxin, tramadol  Past/failed/contraindicated: none    PREVENTIVE:   OTC medications: none  Prescription: none  Medications from other providers: carvedilol, lisinopril-hydrochlorothiazide   Past/failed/contraindicated: propranolol (on other BP meds), topamax (kidney stones)    Alternative therapies used in the past for headaches?   Rest, dark room      Review of Systems   Constitutional:  Negative for appetite change, fatigue and fever.   HENT: Negative.  Negative for hearing loss, tinnitus, trouble swallowing and voice change.    Eyes:  Positive for visual disturbance (light sensitve). Negative for " photophobia and pain.   Respiratory: Negative.  Negative for shortness of breath.    Cardiovascular: Negative.  Negative for palpitations.   Gastrointestinal:  Positive for nausea (onset) and vomiting (onset).   Endocrine: Negative.  Negative for cold intolerance.   Genitourinary: Negative.  Negative for dysuria, frequency and urgency.   Musculoskeletal:  Negative for back pain, gait problem, myalgias, neck pain and neck stiffness.   Skin: Negative.  Negative for rash.   Allergic/Immunologic: Negative.    Neurological:  Positive for headaches (2-3 wkly increased (intensity)). Negative for dizziness, tremors, seizures, syncope, facial asymmetry, speech difficulty, weakness, light-headedness and numbness.   Hematological: Negative.  Does not bruise/bleed easily.   Psychiatric/Behavioral: Negative.  Negative for confusion, hallucinations and sleep disturbance.    All other systems reviewed and are negative.    I have personally reviewed the MA's review of systems and made changes as necessary.    Current Outpatient Medications on File Prior to Visit   Medication Sig Dispense Refill    Accu-Chek FastClix Lancets MISC Use to test blood sugar once a day 102 each 0    acetaminophen (TYLENOL) 500 mg tablet Take 2 tablets (1,000 mg total) by mouth every 8 (eight) hours as needed for mild pain or moderate pain 60 tablet 0    Acetylcysteine (NAC PO) Take 300 mg by mouth 2 (two) times a day      Alpha-Lipoic Acid 600 MG CAPS Take 600 mg by mouth 2 (two) times a day        ammonium lactate (LAC-HYDRIN) 12 % lotion if needed      Apple Cider Vinegar 300 MG TABS Take 300 mg by mouth 2 (two) times a day      Ascorbic Acid (vitamin C) 1000 MG tablet Take 1,000 mg by mouth 2 (two) times a day 2 tablet twice a day      BENFOTIAMINE PO Take 300 mg by mouth 2 (two) times a day      beta carotene 30 MG capsule Take 30 mg by mouth 2 (two) times a day        Bismuth Tribromoph-Petrolatum (Xeroform Oil Emulsion Strip) MISC Apply topically 3  (three) times a day as needed (As needed for dressing changes) 50 each 0    Blood Glucose Monitoring Suppl (Accu-Chek Guide Me) w/Device KIT Use to check blood sugar once a day 1 kit 0    carvedilol (COREG) 25 mg tablet Take 1 tablet (25 mg total) by mouth 2 (two) times a day with meals 200 tablet 1    chlorhexidine gluconate (HIBICLENS) 4 % external liquid Apply 1 Application topically daily as needed for wound care 118 mL 0    Chromium Picolinate 200 MCG TABS Take 200 mcg by mouth 2 (two) times a day      docusate sodium (COLACE) 100 mg capsule Take 1 capsule (100 mg total) by mouth daily as needed for constipation 10 capsule 0    dulaglutide (Trulicity) 0.75 MG/0.5ML injection Inject 0.5 mL (0.75 mg total) under the skin every 7 days 2 mL 5    Empagliflozin (Jardiance) 25 MG TABS Take 1 tablet (25 mg total) by mouth daily 90 tablet 1    Garlic 1200 MG CAPS Take by mouth 2 (two) times a day       glucose blood (Accu-Chek Guide) test strip Use to check blood sugar once a day 100 strip 0    IRON, FERROUS SULFATE, PO Take 25 mg by mouth 2 (two) times a day      ketoconazole (NIZORAL) 2 % cream if needed      ketorolac (ACULAR) 0.5 % ophthalmic solution       lidocaine (Lidoderm) 5 % Apply 1 patch topically over 12 hours daily Remove & Discard patch within 12 hours or as directed by MD 30 patch 5    lisinopril-hydrochlorothiazide (PRINZIDE,ZESTORETIC) 20-12.5 MG per tablet Take 1 tablet by mouth 2 (two) times a day 200 tablet 1    Lutein-Bilberry (LUTEIN PLUS BILBERRY PO) Take by mouth 2 (two) times a day      methocarbamol (ROBAXIN) 750 mg tablet Take 2 tablets (1,500 mg total) by mouth every 8 (eight) hours 30 tablet 0    mupirocin (BACTROBAN) 2 % ointment Apply topically 3 (three) times a day Apply to site of dog bite 3 times daily 15 g 1    NON FORMULARY Take 150 mg by mouth 2 (two) times a day BLUEBERRY      ondansetron (ZOFRAN) 4 mg tablet Take 1 tablet (4 mg total) by mouth every 8 (eight) hours as needed for  nausea or vomiting 20 tablet 0    oxyCODONE (Roxicodone) 5 immediate release tablet Take 1 tablet (5 mg total) by mouth every 6 (six) hours as needed for severe pain Max Daily Amount: 20 mg (Patient not taking: Reported on 2/4/2025) 15 tablet 0    Pyridoxine HCl (B-6 PO) Take by mouth 2 (two) times a day      TART CHERRY PO Take 150 mg by mouth 2 (two) times a day      traMADol (Ultram) 50 mg tablet Take 2 tablets (100 mg total) by mouth daily at bedtime as needed for severe pain 60 tablet 0    Turmeric (QC TUMERIC COMPLEX PO) Take 1,000 mg by mouth 2 (two) times a day Tumeric /curcimin      VITAMIN D PO Take by mouth 2 (two) times a day      vitamin E, tocopherol, 400 units capsule Take 400 Units by mouth 2 (two) times a day      Zinc 50 MG CAPS Take by mouth 2 (two) times a day        No current facility-administered medications on file prior to visit.      Social History     Tobacco Use    Smoking status: Never    Smokeless tobacco: Never   Vaping Use    Vaping status: Never Used   Substance and Sexual Activity    Alcohol use: Yes     Alcohol/week: 1.0 standard drink of alcohol     Types: 1 Cans of beer per week     Comment: ocassional    Drug use: Never    Sexual activity: Not Currently     Partners: Female     Birth control/protection: Abstinence     Objective   There were no vitals taken for this visit.    Physical Exam  Vitals reviewed.   Constitutional:       General: He is not in acute distress.  HENT:      Head: Normocephalic and atraumatic.      Nose: Nose normal.      Mouth/Throat:      Mouth: Mucous membranes are moist.   Eyes:      General: Lids are normal.      Extraocular Movements: Extraocular movements intact.      Pupils: Pupils are equal, round, and reactive to light.   Pulmonary:      Effort: Pulmonary effort is normal.   Skin:     General: Skin is warm and dry.   Neurological:      Coordination: Romberg sign negative.   Psychiatric:         Speech: Speech normal.       Neurological Exam  Mental  Status  Awake, alert and oriented to person, place and time. Speech is normal. Language is fluent with no aphasia. Attention and concentration are normal.    Cranial Nerves  CN II: Visual acuity is normal. Visual fields full to confrontation.  CN III, IV, VI: Extraocular movements intact bilaterally. Normal lids and orbits bilaterally. Pupils equal round and reactive to light bilaterally.  CN V: Facial sensation is normal.  CN VII: Full and symmetric facial movement.  CN VIII: Hearing is normal.  CN IX, X: Palate elevates symmetrically  CN XI: Shoulder shrug strength is normal.  CN XII: Tongue midline without atrophy or fasciculations.    Motor  Normal muscle bulk throughout.                                               Right                     Left  Deltoid                                   5                          5   Biceps                                   5                          5   Triceps                                  5                          5   Iliopsoas                               5                          5   Quadriceps                           5                          5   Hamstring                             5                          5  Ankle dorsiflexor                   5                          5   strength decreased right hand secondary to recent injury and surgery.  Patient with dog bite and status post  right wrist and hand extensor tenolysis and wrist capsulotomy.    Sensory  Light touch is normal in upper and lower extremities.     Coordination  Right: Finger-to-nose normal. Rapid alternating movement normal.Left: Finger-to-nose normal. Rapid alternating movement normal.    Gait  Casual gait is normal including stance, stride, and arm swing. Romberg is absent.    Labs:  Lab Results   Component Value Date    HGBA1C 6.4 10/30/2024     Lab Results   Component Value Date    WBC 5.68 09/21/2024    HGB 10.9 (L) 10/03/2024    HCT 32 (L) 10/03/2024    MCV 95 09/21/2024      09/21/2024     Lab Results   Component Value Date    SODIUM 137 09/21/2024    K 3.8 09/21/2024     09/21/2024    CO2 27 10/03/2024    AGAP 7 09/21/2024    BUN 23 09/21/2024    CREATININE 1.47 (H) 09/21/2024    GLUC 109 09/21/2024    GLUF 130 (H) 09/12/2024    CALCIUM 8.7 09/21/2024    AST 18 09/20/2024    ALT 17 09/20/2024    ALKPHOS 45 09/20/2024    TP 6.5 09/20/2024    TBILI 0.27 09/20/2024    EGFR 50 09/21/2024     Radiology Results Review:   2/16/22 [facility]: No acute intracranial abnormality.     Administrative Statements   I have spent a total time of 50 minutes in caring for this patient on the day of the visit/encounter including Diagnostic results, Prognosis, Risks and benefits of tx options, Instructions for management, Patient and family education, Importance of tx compliance, Risk factor reductions, Impressions, Counseling / Coordination of care, Documenting in the medical record, Reviewing / ordering tests, medicine, procedures  , and Obtaining or reviewing history  .

## 2025-02-10 NOTE — PATIENT INSTRUCTIONS
Additional Testing:    -Sleep study to check for sleep apnea   -I am recommending further Neurodiagnostic workup at this time:  MRI Brain ordered    Headache/migraine treatment:    Prevention: To take every day to help prevent headaches - not to take at the time of headache:  -Start amitriptyline 10mg nightly x 1 week.  Then increase by 10mg weekly until you reach good headache control or you reach a max dosage of 50mg nightly.  Remain on the lowest effective dose      -Over the counter preventive supplements for headaches/migraines (if you try, try for 3 months straight):  -Magnesium 400mg daily (If any diarrhea or upset stomach, decrease dose as tolerated)  -Riboflavin (Vitamin B2) 400mg daily (may make your urine bright/neon yellow)  - All supplements can be purchased online    Abortive: For immediate treatment of a headache/migraine:  -Start nurtec 75mg  at the earliest onset of a headache.  No more than 1 in 24 hours.   -It is ok to take ibuprofen, acetaminophen or naproxen (Advil, Tylenol,  Aleve, Excedrin) if they help your headaches you should limit these to No more than 3 times a week to avoid medication overuse/rebound headaches.     Lifestyle Recommendations:  -Remain well-hydrated drinking at least 48 to 64 ounces of noncaffeinated beverages per day in addition to anything caffeinated.    -It is important to eat meals throughout the day and not go long periods of time between eating.  -Getting adequate rest is also very important for migraine prevention (aim for 7-8 hours per night).     -Regular exercise is also beneficial for headache prevention.  I would encourage at the least 5 days of physical exercise weekly for at least 30 minutes.   -I would like for them to keep track of their migraines using an application on their phone or calendar as they see fit. Phone applications: Migraine Angel or Migraine Diary.    Education and Follow-up:  -Please call or send a [employer] message with any questions or  concerns. Please present to the emergency room with any concerning symptoms such as: worst headache of your life, sudden painless loss of vision or double vision, difficulty speaking or swallowing, vertigo/room spinning that does not quickly resolve, or weakness/numbness/loss of coordination affecting 1 side of the face or body.  -Follow up in 2 months or sooner if needed.

## 2025-02-10 NOTE — PROGRESS NOTES
"Daily Note     Today's date: 2/10/2025  Patient name: oYni Castillo  : 1965  MRN: [id number]  Referring provider: Yesenia Carrasquillo PA-C  Dx:   Encounter Diagnosis     ICD-10-CM    1. Tenosynovitis of finger and hand  M65.949       2. Animal bite of left hand with infection, subsequent encounter  S61.452D     L08.9       3. Wrist stiffness, right  M25.631       4. Aftercare following surgery of the musculoskeletal system  Z47.89       5. Stiffness of finger joint of right hand  M25.641             Start Time: 1154  Stop Time: 1242  Total time in clinic (min): 48 minutes    Subjective: \"It's still leaking.\"      Objective: See treatment diary below    Wrist    Right   Flexion 25 (was 20)   Extension 36 (was 27)     Assessment: Tolerated treatment well. Pt's wound is closed and dry today at start of visit and throughout all exercises. Pt continues with stiffness and is unable to make a fist. Patient would benefit from continued OT      Plan: Continue per plan of care.  Progress treatment as tolerated.       Precautions: R wrist extensor tenolysis, wrist capsulotomy and de quervain's release on     Manuals   re-eval    2/3  2/5 2/10        STM           10'       Scar mob              edema     15'  10'  10'  10' 10'        Wound care   15'  10'  3'                             Ther Ex                       wrist, digit PROM    x20  3x20  3x20  3x20 2x20       Forearm, wrist, and digit AROM X20  x20 Thumb/finger  AROM/PROM Thumb/finger  AROM/PROM  Thumb/finger  AROM/PROM 2x30s each digit composite flexion and isolated MP flexion       Tendon glides   Comp fist x20 Comp fist x20  Comp fist 2x20 2x20 comp fist       Gentle strengthening              Intrinsic stretches     X20 hook fist with finger spacers                                                                                                                            Ther Activity                   activity modification                    "                                                                                                  Neuro Re-Ed                                                               Ortho Fit                                                               Modalities                  HP

## 2025-02-12 ENCOUNTER — OFFICE VISIT (OUTPATIENT)
Dept: OBGYN CLINIC | Facility: MEDICAL CENTER | Age: 60
End: 2025-02-12

## 2025-02-12 ENCOUNTER — OFFICE VISIT (OUTPATIENT)
Dept: OCCUPATIONAL THERAPY | Age: 60
End: 2025-02-12
Payer: MEDICARE

## 2025-02-12 VITALS — BODY MASS INDEX: 38.87 KG/M2 | HEIGHT: 72 IN | WEIGHT: 287 LBS

## 2025-02-12 DIAGNOSIS — M65.949 TENOSYNOVITIS OF FINGER AND HAND: Primary | ICD-10-CM

## 2025-02-12 DIAGNOSIS — M25.641 STIFFNESS OF FINGER JOINT OF RIGHT HAND: ICD-10-CM

## 2025-02-12 DIAGNOSIS — Z47.89 AFTERCARE FOLLOWING SURGERY OF THE MUSCULOSKELETAL SYSTEM: ICD-10-CM

## 2025-02-12 DIAGNOSIS — L08.9 ANIMAL BITE OF LEFT HAND WITH INFECTION, SUBSEQUENT ENCOUNTER: ICD-10-CM

## 2025-02-12 DIAGNOSIS — Z47.89 AFTERCARE FOLLOWING SURGERY OF THE MUSCULOSKELETAL SYSTEM: Primary | ICD-10-CM

## 2025-02-12 DIAGNOSIS — M65.90 TENOSYNOVITIS: ICD-10-CM

## 2025-02-12 DIAGNOSIS — S61.452D ANIMAL BITE OF LEFT HAND WITH INFECTION, SUBSEQUENT ENCOUNTER: ICD-10-CM

## 2025-02-12 DIAGNOSIS — M25.631 WRIST STIFFNESS, RIGHT: ICD-10-CM

## 2025-02-12 PROCEDURE — 97140 MANUAL THERAPY 1/> REGIONS: CPT

## 2025-02-12 PROCEDURE — 97110 THERAPEUTIC EXERCISES: CPT

## 2025-02-12 PROCEDURE — 99024 POSTOP FOLLOW-UP VISIT: CPT | Performed by: ORTHOPAEDIC SURGERY

## 2025-02-12 PROCEDURE — 97760 ORTHOTIC MGMT&TRAING 1ST ENC: CPT

## 2025-02-12 NOTE — PROGRESS NOTES
HAND & UPPER EXTREMITY OFFICE VISIT   Referred By:  No referring provider defined for this encounter.      Chief Complaint:     Right wrist pain    Surgery:  Surgery Date: 1/20/2025 - RELEASE DEQUERVAINS - Right - Right and Right wrist and hand extensor tenolysis and wrist capsulotomy - Right     History of Present Illness:   Patient presents now 3 weeks status post the above surgery. he reports continued pain in the right hand and wrist. He reports very minimal drainage from the incision site. He has been attending OT and working on his ROM exercises. He has also been working with OT to create static progressive splints. He is not yet able to form a fully closed fist. He also reports continued pain and tightness in the volar wrist which shoots proximally. He has been gradually advancing his use of the right hand with increased lifting.     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    COLONOSCOPY      CONTRACTURE Right 1/20/2025    Procedure: Right wrist and hand extensor tenolysis and wrist capsulotomy;  Surgeon: Carroll Mccarthy MD;  [facility];  Service: Orthopedics    FOOT SURGERY  2022    Left Foot    GANGLION CYST EXCISION Left 03/25/2024    Procedure: EXCISION MUCOID CYST, INDEX FINGER DIP;  Surgeon: Carroll Mccarthy MD;  [facility];  Service: Orthopedics    GANGLION CYST EXCISION Right 05/20/2024    Procedure: EXCISION MUCOID CYST - RIGHT INDEX AND MIDDLE FINGERS;  Surgeon: Carroll Mccarthy MD;  [facility];  Service: Orthopedics    HERNIA REPAIR      INCISION AND DRAINAGE  01/16/2024    KIDNEY SURGERY      KNEE SURGERY  1980     MOUTH SURGERY      OR AMPUTATION METATARSAL W/TOE SINGLE Left 6/1/2022    Procedure: RAY RESECTION FOOT;  Surgeon: Hannah Melgoza DPM;  [facility];  Service: Podiatry    OR INCISION & DRAINAGE ABSCESS COMPLICATED/MULTIPLE Right 09/17/2024    Procedure: Irrigation and debridement right wrist and hand, any indicated procedures;  Surgeon: Carroll Mccarthy MD;  [facility];  Service: Orthopedics    OR INCISION EXTENSOR TENDON SHEATH WRIST Right 1/20/2025    Procedure: RELEASE DEQUERVAINS - Right;  Surgeon: Carroll Mccarthy MD;  [facility];  Service: Orthopedics    OR RPR AA HERNIA 1ST < 3 CM REDUCIBLE N/A 7/14/2023    Procedure: REPAIR HERNIA INCISIONAL;  Surgeon: Matt Melo MD;  [facility];  Service: General    OR SYNOVECTOMY EXTENSOR TENDON SHTH WRIST 1 CMPRT Right 10/03/2024    Procedure: Irrigation and debridement, right wrist, volar and dorsal, possible extensor and flexor tenosynovectomy, any indicated procedures;  Surgeon: Carroll Mccarthy MD;  [facility];  Service: Orthopedics    TONSILLECTOMY  1968    Yes    WOUND DEBRIDEMENT Left 4/28/2022    Procedure: Left foot washout;  Surgeon: Hannah Melgoza DPM;  [facility];  Service: Podiatry    WOUND DEBRIDEMENT Left 6/6/2022    Procedure: DEBRIDEMENT WOUND (WASH OUT);  Surgeon: Hannah Melgoza DPM;  [facility];  Service: Podiatry     Family History   Problem Relation Age of Onset    Diabetes Paternal Grandmother     Diabetes Paternal Grandfather     Arthritis Maternal Grandmother      Social History     Socioeconomic History    Marital status: Registered Domestic Partner     Spouse name: Not on file    Number of children: Not on file    Years of education: Not on file    Highest education level: Not on file   Occupational History    Not on file   Tobacco Use    Smoking status: Never    Smokeless tobacco: Never   Vaping Use    Vaping status: Never Used   Substance and  Sexual Activity    Alcohol use: Yes     Alcohol/week: 1.0 standard drink of alcohol     Types: 1 Cans of beer per week     Comment: ocassional    Drug use: Never    Sexual activity: Not Currently     Partners: Female     Birth control/protection: Abstinence   Other Topics Concern    Not on file   Social History Narrative    Not on file     Social Drivers of Health     Financial Resource Strain: Not on file   Food Insecurity: Patient Declined (11/13/2024)    Hunger Vital Sign     Worried About Running Out of Food in the Last Year: Patient declined     Ran Out of Food in the Last Year: Patient declined   Transportation Needs: Patient Declined (11/13/2024)    PRAPARE - Transportation     Lack of Transportation (Medical): Patient declined     Lack of Transportation (Non-Medical): Patient declined   Physical Activity: Not on file   Stress: Not on file   Social Connections: Not on file   Intimate Partner Violence: Not on file   Housing Stability: Patient Declined (11/13/2024)    Housing Stability Vital Sign     Unable to Pay for Housing in the Last Year: Patient declined     Number of Times Moved in the Last Year: 0     Homeless in the Last Year: Patient declined     Scheduled Meds:  Continuous Infusions:No current facility-administered medications for this visit.    PRN Meds:.  Allergies   Allergen Reactions    Cephalexin Hives     Skin peeling  Tolerates penicillins (tolerated unasyn and zosyn)    Pollen Extract Sneezing    Metformin GI Intolerance       Physical Examination:    Ht 6' (1.829 m)   Wt 130 kg (287 lb)   BMI 38.92 kg/m²     Gen: A&Ox3, NAD    Right Upper Extremity:  Small scab present at center of dorsal wrist incision. Remainder of incision fully closed. No expressible fluid. No significant surrounding erythema or signs of infection. De Quervain's incision well-healed.   Remaining Sutures removed  Swelling Positive  Wrist AROM: 10 deg extension, 35 deg flexion   50% composite fist  Sensation intact to  light touch in the axillary median, ulnar, and radial nerve distributions  5/5 motor   Warm, well-perfused digits  Cap refill <2s      Studies:  No new imaging available.     Assessment and Plan:  1. Aftercare following surgery of the musculoskeletal system        2. Tenosynovitis        3. Stiffness of finger joint of right hand        4. Wrist stiffness, right              60 y.o. male presents 3 weeks status post RELEASE DEQUERVAINS - Right - Right and Right wrist and hand extensor tenolysis and wrist capsulotomy - Right. Remaining sutures removed in the office today. Unfortunately he continues to have pain and stiffness which is most severe at the volar wrist. We did discuss the chance of additional surgery for tenolysis of his flexor tendons due to his continued stiffness and pain. Recommend continued OT and use of the static progressive splinting for now.  I want to wait at least until his dorsal wound is fully closed before intervening volarly.   It is recommended he return to the office in 4 weeks, or sooner should symptoms worsen for repeat evaluation.     he expressed understanding of the plan and agreed. We encouraged them to contact our office with any questions or concerns.         Carroll Mccarthy MD  Hand and Upper Extremity Surgery          *This note was dictated using Dragon voice recognition software. Please excuse any word substitutions or errors.*      Scribe Attestation      I,:  Yesenia Carrasquillo PA-C am acting as a scribe while in the presence of the attending physician.:       I,:  Carroll Mccarthy MD personally performed the services described in this documentation    as scribed in my presence.:

## 2025-02-12 NOTE — PROGRESS NOTES
"Daily Note     Today's date: 2025  Patient name: Yoni Castillo  : 1965  MRN: [id number]  Referring provider: Yesenia Carrasquillo PA-C  Dx:   Encounter Diagnosis     ICD-10-CM    1. Tenosynovitis of finger and hand  M65.949       2. Animal bite of left hand with infection, subsequent encounter  S61.452D     L08.9       3. Wrist stiffness, right  M25.631       4. Aftercare following surgery of the musculoskeletal system  Z47.89       5. Stiffness of finger joint of right hand  M25.641             Start Time: 1150  Stop Time: 1300  Total time in clinic (min): 70 minutes    Subjective: \"I get my sutures out today.\"      Objective: See treatment diary below        Assessment: Tolerated treatment well. Pt has one small area of his incision that remains open. Reviewed all AROM/PROM exercises. Fabricated 1/2 of a wrist static progressive splint. Pt will utilize the hand/finger extension component with straps to pull the wrist into an extension stretch. Patient would benefit from continued OT      Plan: Continue per plan of care.  Progress treatment as tolerated.       Precautions: R wrist extensor tenolysis, wrist capsulotomy and de quervain's release on     Manuals   re-eval    2/3  2/5 2/10 2/12       STM           10' 10      Scar mob              edema     15'  10'  10'  10' 10' 5       Wound care   15'  10'  3'                             Ther Ex                       wrist, digit PROM    x20  3x20  3x20  3x20 2x20 2x20      Forearm, wrist, and digit AROM X20  x20 Thumb/finger  AROM/PROM Thumb/finger  AROM/PROM  Thumb/finger  AROM/PROM 2x30s each digit composite flexion and isolated MP flexion 2x30s each digit composite flexion and isolated MP flexion      Tendon glides   Comp fist x20 Comp fist x20  Comp fist 2x20 2x20 comp fist 2x20 comp fist      Gentle strengthening              Intrinsic stretches     X20 hook fist with finger spacers                                                       "                                                                      Ther Activity                   activity modification                                                                                                                     Neuro Re-Ed                                                               Ortho Fit                  Wrist/digit static progressive extension splint       30'                                      Modalities                  HP            5'

## 2025-02-13 ENCOUNTER — TELEPHONE (OUTPATIENT)
Age: 60
End: 2025-02-13

## 2025-02-13 ENCOUNTER — OFFICE VISIT (OUTPATIENT)
Dept: NEUROLOGY | Facility: CLINIC | Age: 60
End: 2025-02-13
Payer: MEDICARE

## 2025-02-13 VITALS
OXYGEN SATURATION: 98 % | HEART RATE: 76 BPM | WEIGHT: 288 LBS | HEIGHT: 72 IN | BODY MASS INDEX: 39.01 KG/M2 | SYSTOLIC BLOOD PRESSURE: 116 MMHG | DIASTOLIC BLOOD PRESSURE: 74 MMHG | TEMPERATURE: 97.6 F

## 2025-02-13 DIAGNOSIS — G43.709 CHRONIC MIGRAINE WITHOUT AURA WITHOUT STATUS MIGRAINOSUS, NOT INTRACTABLE: Primary | ICD-10-CM

## 2025-02-13 DIAGNOSIS — R51.9 CHRONIC NONINTRACTABLE HEADACHE, UNSPECIFIED HEADACHE TYPE: ICD-10-CM

## 2025-02-13 DIAGNOSIS — G89.29 CHRONIC NONINTRACTABLE HEADACHE, UNSPECIFIED HEADACHE TYPE: ICD-10-CM

## 2025-02-13 DIAGNOSIS — R06.83 SNORING: ICD-10-CM

## 2025-02-13 PROCEDURE — 99204 OFFICE O/P NEW MOD 45 MIN: CPT

## 2025-02-13 RX ORDER — RIMEGEPANT SULFATE 75 MG/75MG
TABLET, ORALLY DISINTEGRATING ORAL
Qty: 8 TABLET | Refills: 3 | Status: SHIPPED | OUTPATIENT
Start: 2025-02-13 | End: 2025-02-18

## 2025-02-13 RX ORDER — AMITRIPTYLINE HYDROCHLORIDE 10 MG/1
TABLET ORAL
Qty: 150 TABLET | Refills: 1 | Status: SHIPPED | OUTPATIENT
Start: 2025-02-13

## 2025-02-13 NOTE — PROGRESS NOTES
Review of Systems   Constitutional:  Negative for appetite change, fatigue and fever.   HENT: Negative.  Negative for hearing loss, tinnitus, trouble swallowing and voice change.    Eyes:  Positive for visual disturbance (light sensitve). Negative for photophobia and pain.   Respiratory: Negative.  Negative for shortness of breath.    Cardiovascular: Negative.  Negative for palpitations.   Gastrointestinal:  Positive for nausea (onset) and vomiting (onset).   Endocrine: Negative.  Negative for cold intolerance.   Genitourinary: Negative.  Negative for dysuria, frequency and urgency.   Musculoskeletal:  Negative for back pain, gait problem, myalgias, neck pain and neck stiffness.   Skin: Negative.  Negative for rash.   Allergic/Immunologic: Negative.    Neurological:  Positive for headaches (2-3 wkly increased (intensity)). Negative for dizziness, tremors, seizures, syncope, facial asymmetry, speech difficulty, weakness, light-headedness and numbness.   Hematological: Negative.  Does not bruise/bleed easily.   Psychiatric/Behavioral: Negative.  Negative for confusion, hallucinations and sleep disturbance.    All other systems reviewed and are negative.

## 2025-02-13 NOTE — TELEPHONE ENCOUNTER
Medicare Wellness  Personal Prevention Plan of Service     Date of Office Visit:    Encounter Provider:  Simba Moore MD  Place of Service:  [facility]  Patient Name: Ricardo Pereira  :  1974    As part of the Medicare Wellness portion of your visit today, we are providing you with this personalized preventive plan of services (PPPS). This plan is based upon recommendations of the [place] Preventive Services Task Force (USPSTF) and the Advisory Committee on Immunization Practices (ACIP).    This lists the preventive care services that should be considered, and provides dates of when you are due. Items listed as completed are up-to-date and do not require any further intervention.    Health Maintenance   Topic Date Due    DIABETIC EYE EXAM  Never done    Hepatitis B (1 of 3 - 19+ 3-dose series) Never done    ZOSTER VACCINE (1 of 2) Never done    COLORECTAL CANCER SCREENING  2023    URINE MICROALBUMIN  2023    HEMOGLOBIN A1C  2023    LIPID PANEL  2024    INFLUENZA VACCINE  2024 (Originally 2023)    ANNUAL WELLNESS VISIT  2025    DIABETIC FOOT EXAM  2025    BMI FOLLOWUP  2025    TDAP/TD VACCINES (3 - Td or Tdap) 10/31/2032    HEPATITIS C SCREENING  Completed    COVID-19 Vaccine  Completed    Pneumococcal Vaccine 0-64  Completed       Orders Placed This Encounter   Procedures    COVID-19 F23 (Pfizer) 12yrs+ (COMIRNATY)       Return in about 1 year (around 3/20/2025) for Medicare Wellness.           Called [facility]. [facility] does require a prior auth. This may be done through [employer], [id number].

## 2025-02-13 NOTE — TELEPHONE ENCOUNTER
----- Message from JAN Cunha sent at 2/13/2025 12:15 PM EST -----  Regarding: PA  Medication prescribed for patient that likely requires PA: nurtec- avoid triptans secondary to uncontrolled HTN    Thank you for your assistance.

## 2025-02-14 ENCOUNTER — APPOINTMENT (OUTPATIENT)
Dept: OCCUPATIONAL THERAPY | Age: 60
End: 2025-02-14
Payer: MEDICARE

## 2025-02-15 NOTE — ED PROVIDER NOTES
History  Chief Complaint   Patient presents with    Back Pain    Leg Pain     Pt reports leg and back pain, ongoing for a while, was seen last week and medications not helping  This is a 22-year-old male with past medical history of diabetes status post left 5th digit amputation about 1 month ago  Patient reports that he has had increasing pain in his left foot  He does follow-up with podiatry who did have to put in more sutures recently due to wound not healing properly  Patient reports that the pain is so unbearable sometimes that he can barely walk on it and it causes him to feel nauseous  He denies any drainage at home  Denies any fever, chills  He denies any increasing redness, swelling  Patient was also triaged with report of back pain however he was more concerned about his foot pain and stated his back pain was chronic  Prior to Admission Medications   Prescriptions Last Dose Informant Patient Reported? Taking? Alpha-Lipoic Acid 600 MG CAPS   Yes No   Sig: Take 600 mg by mouth 2 (two) times a day     Apple Cider Vinegar 300 MG TABS   Yes No   Sig: Take 300 mg by mouth daily     Ascorbic Acid (vitamin C) 1000 MG tablet   Yes No   Sig: Take 1,000 mg by mouth 2 (two) times a day   BENFOTIAMINE PO   Yes No   Sig: Take 300 mg by mouth 2 (two) times a day     Blood Glucose Monitoring Suppl (OneTouch Verio Sync System) w/Device KIT  Self No No   Sig: Use daily Test sugar daily in the morning     Empagliflozin (Jardiance) 25 MG TABS   No No   Sig: Take 1 tablet (25 mg total) by mouth daily   Garlic 2238 MG CAPS  Self Yes No   Sig: Take by mouth 2 (two) times a day    Loteprednol Etabonate 1 % SUSP   Yes No   Sig: Apply to eye 2 (two) times a day I drop right eye   Patient not taking: Reported on 7/25/2022   Semaglutide,0 25 or 0 5MG/DOS, (Ozempic, 0 25 or 0 5 MG/DOSE,) 2 MG/1 5ML SOPN   No No   Sig: Inject 0 25 mg under the skin once a week   Turmeric (QC TUMERIC COMPLEX PO)   Yes No   Sig: Take 1,000 mg by mouth once Tumeric /curcimin   VITAMIN D PO   Yes No   Sig: Take by mouth 2 (two) times a day   Zinc 50 MG CAPS  Self Yes No   Sig: Take by mouth 2 (two) times a day    beta carotene 30 MG capsule  Self Yes No   Sig: Take 30 mg by mouth 2 (two) times a day     carvedilol (COREG) 25 mg tablet   No No   Sig: Take 1 tablet (25 mg total) by mouth 2 (two) times a day with meals   erythromycin (ILOTYCIN) ophthalmic ointment   Yes No   Patient not taking: Reported on 2022   gentamicin (GARAMYCIN) 0 3 % ophthalmic solution   Yes No   Si drop 2 (two) times a day Right eye   Patient not taking: Reported on 2022   glucose blood (OneTouch Verio) test strip  Self No No   Sig: Test sugar daily in the morning  lisinopril-hydrochlorothiazide (PRINZIDE,ZESTORETIC) 20-12 5 MG per tablet   No No   Sig: Take 1 tablet by mouth 2 (two) times a day   methocarbamol (ROBAXIN) 500 mg tablet   No No   Sig: Take 1 tablet (500 mg total) by mouth 2 (two) times a day   traMADol (Ultram) 50 mg tablet   No No   Sig: Take 1 tablet (50 mg total) by mouth every 6 (six) hours as needed for moderate pain for up to 30 doses   vitamin E, tocopherol, 400 units capsule  Self Yes No   Sig: Take 400 Units by mouth daily      Facility-Administered Medications: None       Past Medical History:   Diagnosis Date    H/O eye surgery     High blood sugar     Hypertension        Past Surgical History:   Procedure Laterality Date    HERNIA REPAIR      KIDNEY SURGERY      MOUTH SURGERY      PA AMPUTATION METATARSAL+TOE,SINGLE Left 2022    Procedure: RAY RESECTION FOOT;  Surgeon: Blanco Sellers DPM;  Location: [facility];  Service: Podiatry    WOUND DEBRIDEMENT Left 2022    Procedure: Left foot washout;  Surgeon: Blanco Sellers DPM;  Location[facility];  Service: Podiatry    WOUND DEBRIDEMENT Left 2022    Procedure: DEBRIDEMENT WOUND [facility] OUT);   Surgeon: Blanco Sellers DPM;  Location: [facility];  Service: Podiatry       No family history on file  I have reviewed and agree with the history as documented  E-Cigarette/Vaping    E-Cigarette Use Never User      E-Cigarette/Vaping Substances    Nicotine No     THC No     CBD No     Flavoring No     Other No     Unknown No      Social History     Tobacco Use    Smoking status: Never Smoker    Smokeless tobacco: Never Used   Vaping Use    Vaping Use: Never used   Substance Use Topics    Alcohol use: Yes     Comment: ocassional    Drug use: Never       Review of Systems   Constitutional: Negative for chills and fever  HENT: Negative for ear pain and sore throat  Eyes: Negative for pain and visual disturbance  Respiratory: Negative for cough and shortness of breath  Cardiovascular: Negative for chest pain and palpitations  Gastrointestinal: Negative for abdominal pain and vomiting  Genitourinary: Negative for dysuria and hematuria  Musculoskeletal: Positive for arthralgias and back pain  Skin: Negative for color change and rash  Neurological: Negative for seizures and syncope  All other systems reviewed and are negative  Physical Exam  Physical Exam  Vitals and nursing note reviewed  Constitutional:       Appearance: He is well-developed  HENT:      Head: Normocephalic and atraumatic  Eyes:      Conjunctiva/sclera: Conjunctivae normal    Cardiovascular:      Rate and Rhythm: Normal rate and regular rhythm  Heart sounds: No murmur heard  Pulmonary:      Effort: Pulmonary effort is normal  No respiratory distress  Breath sounds: Normal breath sounds  Abdominal:      Palpations: Abdomen is soft  Tenderness: There is no abdominal tenderness  Musculoskeletal:         General: Normal range of motion  Cervical back: Neck supple  Feet:      Comments: Left 5th digit is amputated  No signs of infection  Patient does have 2 sutures that are placed in a wound about 3 cm below stub of 5th toe    It appears to be well healing  Some slight bruising around the area however no redness, warmth, swelling  Sensory and pulses intact  Full range of motion  Skin:     General: Skin is warm and dry  Capillary Refill: Capillary refill takes less than 2 seconds  Neurological:      General: No focal deficit present  Mental Status: He is alert and oriented to person, place, and time  Psychiatric:         Mood and Affect: Mood normal          Behavior: Behavior normal          Vital Signs  ED Triage Vitals   Temperature Pulse Respirations Blood Pressure SpO2   07/29/22 1828 07/29/22 1829 07/29/22 1829 07/29/22 1829 07/29/22 1829   98 4 °F (36 9 °C) 88 18 128/85 98 %      Temp Source Heart Rate Source Patient Position - Orthostatic VS BP Location FiO2 (%)   07/29/22 1828 07/29/22 1829 07/29/22 1829 07/29/22 1829 --   Oral Monitor Sitting Right arm       Pain Score       --                  Vitals:    07/29/22 1829   BP: 128/85   Pulse: 88   Patient Position - Orthostatic VS: Sitting         Visual Acuity      ED Medications  Medications - No data to display    Diagnostic Studies  Results Reviewed     None                 XR foot 3+ views LEFT   ED Interpretation by Ralph Patel PA-C (07/29 2036)   No acute findings                  Procedures  Procedures         ED Course                                             MDM  Number of Diagnoses or Management Options  Left foot pain: new and does not require workup  Low back pain with sciatica, sciatica laterality unspecified, unspecified back pain laterality, unspecified chronicity: new and does not require workup  Diagnosis management comments: 38-year-old male presents with left foot pain  Physical exam as noted above  X-ray of left foot was performed and reviewed by me which showed no acute findings, left 5th toe amputated  No signs of infection  I feel that the pain is just residual from the surgery as well as neuropathy    Patient does follow with Podiatry and has a [TextEntry] : Sister:  of myocardial infarction at age 38 visit with them next week  Instructed him to use ibuprofen and Tylenol to which he laughed that and said they do not work  We discussed that if he were having unbearable pain that he needs to follow up with Podiatry  Also prescribed lidocaine patches for his back pain at his request     I have discussed the plan to discharge pt from ED  The patient was discharged in stable condition   Patient ambulated off the department   Extensive return to emergency department precautions were discussed   Follow up with appropriate providers including primary care physician was discussed   Patient and/or their  primary decision maker expressed understanding  Yan Soliman remained stable during entire emergency department stay  Amount and/or Complexity of Data Reviewed  Tests in the radiology section of CPT®: ordered and reviewed  Decide to obtain previous medical records or to obtain history from someone other than the patient: yes  Review and summarize past medical records: yes  Independent visualization of images, tracings, or specimens: yes    Patient Progress  Patient progress: stable      Disposition  Final diagnoses:   Left foot pain   Low back pain with sciatica, sciatica laterality unspecified, unspecified back pain laterality, unspecified chronicity     Time reflects when diagnosis was documented in both MDM as applicable and the Disposition within this note     Time User Action Codes Description Comment    7/29/2022  8:36 PM Mario Charles Add [W16 [id number]] Left foot pain     7/29/2022  8:49 PM Mario Charles Add [M54 40] Low back pain with sciatica, sciatica laterality unspecified, unspecified back pain laterality, unspecified chronicity       ED Disposition     ED Disposition   Discharge    Condition   Stable    Date/Time   Fri Jul 29, 2022  8:36 PM    Comment   Onel Horner discharge to home/self care                 Follow-up Information     Follow up With Specialties Details Why Contact Info Additional Information Katerina Dumont MD Internal Medicine  If symptoms worsen [address]  [address], [address] [facility]  [place] [address] Emergency Department Emergency Medicine  [facility] wor[facility] [id number] [address] [facility] Emergency Department, [address] [place], [place], [address]          Discharge Medication List as of 7/29/2022  8:37 PM      CONTINUE these medications which have NOT CHANGED    Details   Alpha-Lipoic Acid 600 MG CAPS Take 600 mg by mouth 2 (two) times a day  , Historical Med      Apple Cider Vinegar 300 MG TABS Take 300 mg by mouth daily  , Historical Med      Ascorbic Acid (vitamin C) 1000 MG tablet Take 1,000 mg by mouth 2 (two) times a day, Historical Med      BENFOTIAMINE PO Take 300 mg by mouth 2 (two) times a day  , Historical Med      beta carotene 30 MG capsule Take 30 mg by mouth 2 (two) times a day  , Historical Med      Blood Glucose Monitoring Suppl (OneTouch Verio Sync System) w/Device KIT Use daily Test sugar daily in the morning , Starting Mon 3/1/2021, Normal      carvedilol (COREG) 25 mg tablet Take 1 tablet (25 mg total) by mouth 2 (two) times a day with meals, Starting Mon 6/14/2021, Normal      Empagliflozin (Jardiance) 25 MG TABS Take 1 tablet (25 mg total) by mouth daily, Starting Tue 11/2/2021, Normal      erythromycin (ILOTYCIN) ophthalmic ointment Starting Fri 4/22/2022, Historical Med      Garlic 2982 MG CAPS Take by mouth 2 (two) times a day , Historical Med      gentamicin (GARAMYCIN) 0 3 % ophthalmic solution 1 drop 2 (two) times a day Right eye, Historical Med      glucose blood (OneTouch Verio) test strip Test sugar daily in the morning , Normal      lisinopril-hydrochlorothiazide (PRINZIDE,ZESTORETIC) 20-12 5 MG per tablet Take 1 tablet by mouth 2 (two) times a day, Starting Wed 7/14/2021, Normal      Loteprednol Etabonate 1 % SUSP Apply to eye 2 (two) times a day I drop right eye, Historical Med      methocarbamol (ROBAXIN) 500 mg tablet Take 1 tablet (500 mg total) by mouth 2 (two) times a day, Starting Mon 7/25/2022, Normal      Semaglutide,0 25 or 0 5MG/DOS, (Ozempic, 0 25 or 0 5 MG/DOSE,) 2 MG/1 5ML SOPN Inject 0 25 mg under the skin once a week, Starting Tue 11/2/2021, Normal      traMADol (Ultram) 50 mg tablet Take 1 tablet (50 mg total) by mouth every 6 (six) hours as needed for moderate pain for up to 30 doses, Starting Thu 7/28/2022, Normal      Turmeric (QC TUMERIC COMPLEX PO) Take 1,000 mg by mouth once Tumeric /curcimin, Historical Med      VITAMIN D PO Take by mouth 2 (two) times a day, Historical Med      vitamin E, tocopherol, 400 units capsule Take 400 Units by mouth daily, Historical Med      Zinc 50 MG CAPS Take by mouth 2 (two) times a day , Historical Med             No discharge procedures on file      PDMP Review       Value Time User    PDMP Reviewed  Yes 7/28/2022  4:55 PM Carlos Elaine MD          ED Provider  Electronically Signed by           Pamela Nugent PA-C  07/29/22 0338

## 2025-02-16 DIAGNOSIS — M51.369 DDD (DEGENERATIVE DISC DISEASE), LUMBAR: ICD-10-CM

## 2025-02-16 DIAGNOSIS — M79.605 LEFT LEG PAIN: ICD-10-CM

## 2025-02-17 ENCOUNTER — OFFICE VISIT (OUTPATIENT)
Dept: OCCUPATIONAL THERAPY | Age: 60
End: 2025-02-17
Payer: MEDICARE

## 2025-02-17 DIAGNOSIS — M25.631 WRIST STIFFNESS, RIGHT: ICD-10-CM

## 2025-02-17 DIAGNOSIS — M65.949 TENOSYNOVITIS OF FINGER AND HAND: Primary | ICD-10-CM

## 2025-02-17 DIAGNOSIS — L08.9 ANIMAL BITE OF LEFT HAND WITH INFECTION, SUBSEQUENT ENCOUNTER: ICD-10-CM

## 2025-02-17 DIAGNOSIS — S61.452D ANIMAL BITE OF LEFT HAND WITH INFECTION, SUBSEQUENT ENCOUNTER: ICD-10-CM

## 2025-02-17 DIAGNOSIS — M25.641 STIFFNESS OF FINGER JOINT OF RIGHT HAND: ICD-10-CM

## 2025-02-17 DIAGNOSIS — Z47.89 AFTERCARE FOLLOWING SURGERY OF THE MUSCULOSKELETAL SYSTEM: ICD-10-CM

## 2025-02-17 PROCEDURE — 97110 THERAPEUTIC EXERCISES: CPT

## 2025-02-17 PROCEDURE — 97140 MANUAL THERAPY 1/> REGIONS: CPT

## 2025-02-17 PROCEDURE — 97760 ORTHOTIC MGMT&TRAING 1ST ENC: CPT

## 2025-02-17 RX ORDER — TRAMADOL HYDROCHLORIDE 50 MG/1
100 TABLET ORAL
Qty: 60 TABLET | Refills: 0 | Status: SHIPPED | OUTPATIENT
Start: 2025-02-17

## 2025-02-17 RX ORDER — METHOCARBAMOL 750 MG/1
1500 TABLET, FILM COATED ORAL EVERY 8 HOURS SCHEDULED
Qty: 30 TABLET | Refills: 0 | OUTPATIENT
Start: 2025-02-17

## 2025-02-17 NOTE — PROGRESS NOTES
"Daily Note     Today's date: 2025  Patient name: Yoni Castillo  : 1965  MRN: [id number]  Referring provider: Yesenia Carrasquillo PA-C  Dx:   Encounter Diagnosis     ICD-10-CM    1. Tenosynovitis of finger and hand  M65.949       2. Animal bite of left hand with infection, subsequent encounter  S61.452D     L08.9       3. Wrist stiffness, right  M25.631       4. Stiffness of finger joint of right hand  M25.641       5. Aftercare following surgery of the musculoskeletal system  Z47.89               Start Time: 1150  Stop Time: 1248  Total time in clinic (min): 58 minutes    Subjective: \"The brace helps but my thumb hurts when I stretch.\"      Objective: See treatment diary below    Wrist flexion: 32  Wrist extension: 42    Assessment: Tolerated treatment well. Pt with improved wrist AROM. Adjusted static progressive splint for improved comfort. Patient would benefit from continued OT      Plan: Continue per plan of care.  Progress treatment as tolerated.       Precautions: R wrist extensor tenolysis, wrist capsulotomy and de quervain's release on     Manuals   re-eval    2/3  2/5 2/10 2/12 2/17      STM           10' 10 10'     Scar mob              edema     15'  10'  10'  10' 10' 5 5'      Wound care   15'  10'  3'                             Ther Ex                       wrist, digit PROM    x20  3x20  3x20  3x20 2x20 2x20 2x20     Forearm, wrist, and digit AROM X20  x20 Thumb/finger  AROM/PROM Thumb/finger  AROM/PROM  Thumb/finger  AROM/PROM 2x30s each digit composite flexion and isolated MP flexion 2x30s each digit composite flexion and isolated MP flexion 2x30s each digit composite flexion and isolated MP flexion     Tendon glides   Comp fist x20 Comp fist x20  Comp fist 2x20 2x20 comp fist 2x20 comp fist 2x20 comp fist     Gentle strengthening              Intrinsic stretches     X20 hook fist with finger spacers                                                                          "                                                   Ther Activity                   activity modification                                                                                                                     Neuro Re-Ed                                                               Ortho Fit                  Wrist/digit static progressive extension splint       30' 10' adjust                                     Modalities                  HP            5'

## 2025-02-18 NOTE — PRE-PROCEDURE INSTRUCTIONS
Pre-Surgery Instructions:   Medication Instructions    acetaminophen (TYLENOL) 500 mg tablet Uses PRN- OK to take day of surgery    Acetylcysteine (NAC PO) Stop taking 7 days prior to surgery.    Alpha-Lipoic Acid 600 MG CAPS Stop taking 7 days prior to surgery.    amitriptyline (ELAVIL) 10 mg tablet Take night before surgery    ammonium lactate (LAC-HYDRIN) 12 % lotion Stop taking 7 days prior to surgery.    Apple Cider Vinegar 300 MG TABS Stop taking 7 days prior to surgery.    Ascorbic Acid (vitamin C) 1000 MG tablet Stop taking 7 days prior to surgery.    BENFOTIAMINE PO Stop taking 7 days prior to surgery.    beta carotene 30 MG capsule Stop taking 7 days prior to surgery.    carvedilol (COREG) 25 mg tablet Take day of surgery.    Chromium Picolinate 200 MCG TABS Stop taking 7 days prior to surgery.    Empagliflozin (Jardiance) 25 MG TABS Stop taking 4 days prior to surgery.LD 2/27    Garlic 1200 MG CAPS Stop taking 7 days prior to surgery.    IRON, FERROUS SULFATE, PO Stop taking 7 days prior to surgery.    ketoconazole (NIZORAL) 2 % cream Uses PRN- OK to take day of surgery    lidocaine (Lidoderm) 5 % Uses PRN- OK to take day of surgery    lisinopril-hydrochlorothiazide (PRINZIDE,ZESTORETIC) 20-12.5 MG per tablet Hold day of surgery.    Lutein-Bilberry (LUTEIN PLUS BILBERRY PO) Stop taking 7 days prior to surgery.    methocarbamol (ROBAXIN) 750 mg tablet Uses PRN- OK to take day of surgery    NON FORMULARY Stop taking 7 days prior to surgery.    Pyridoxine HCl (B-6 PO) Stop taking 7 days prior to surgery.    rimegepant sulfate (Nurtec) 75 mg TBDP Uses PRN- OK to take day of surgery    TART CHERRY PO Stop taking 7 days prior to surgery.    traMADol (Ultram) 50 mg tablet Take night before surgery    Turmeric (QC TUMERIC COMPLEX PO) Stop taking 7 days prior to surgery.    VITAMIN D PO Stop taking 7 days prior to surgery.    vitamin E, tocopherol, 400 units capsule Stop taking 7 days prior to surgery.    Zinc 50  MG CAPS Stop taking 7 days prior to surgery.    Medication instructions for day of surgery reviewed. Please take all instructed medications with only a sip of water.       You will receive a call one business day prior to surgery with an arrival time and hospital directions. If your surgery is scheduled on a Monday, the hospital will be calling you on the Friday prior to your surgery. If you have not heard from anyone by 8pm, please call the hospital supervisor through the hospital  at [phone]. ([place] [phone] or [place] [phone]).    Do not eat or drink anything after midnight the night before your surgery, including candy, mints, lifesavers, or chewing gum. Do not drink alcohol 24hrs before your surgery. Try not to smoke at least 24hrs before your surgery.       Follow the pre surgery showering instructions as listed in the “My Surgical Experience Booklet” or otherwise provided by your surgeon's office. Do not use a blade to shave the surgical area 1 week before surgery. It is okay to use a clean electric clippers up to 24 hours before surgery. Do not apply any lotions, creams, including makeup, cologne, deodorant, or perfumes after showering on the day of your surgery. Do not use dry shampoo, hair spray, hair gel, or any type of hair products.     No contact lenses, eye make-up, or artificial eyelashes. Remove nail polish, including gel polish, and any artificial, gel, or acrylic nails if possible. Remove all jewelry including rings and body piercing jewelry.     Wear causal clothing that is easy to take on and off. Consider your type of surgery.    Keep any valuables, jewelry, piercings at home. Please bring any specially ordered equipment (sling, braces) if indicated.    Arrange for a responsible person to drive you to and from the hospital on the day of your surgery. Please confirm the visitor policy for the day of your procedure when you receive your phone call with an arrival time.      Call the surgeon's office with any new illnesses, exposures, or additional questions prior to surgery.    Please reference your “My Surgical Experience Booklet” for additional information to prepare for your upcoming surgery.

## 2025-02-18 NOTE — TELEPHONE ENCOUNTER
JAN Cruz  You1 hour ago (3:26 PM)       Ok I will order ubrelvy then. Please make patient aware.  This medication can be repeated again after 2 hours if needed. He should still avoid taking it more than 3x per week or 12x per month   ____________________________________________    Prior auth for Ubrelvy submitted through [facility], [place] [id number]. Received approval. Med is approved through 12/31/2025. Called [facility]. The pharmacist was able to obtain a paid claim.    Spoke with pt and made him aware Reviewed the directions for Ubrelvy. Pt wanted to make the provider aware that the amitriptyline is really helping with his headaches. He wakes up with a dull headache, drinks a cup of coffee, and then feels better. He is pleased with the effectiveness of the  medication.

## 2025-02-18 NOTE — TELEPHONE ENCOUNTER
Nurtec has been denied. It is non formulary. The preferred med is Ubrelvy. Routed to provider to make her aware.

## 2025-02-19 ENCOUNTER — TRANSCRIBE ORDERS (OUTPATIENT)
Dept: SLEEP CENTER | Facility: CLINIC | Age: 60
End: 2025-02-19

## 2025-02-19 ENCOUNTER — APPOINTMENT (OUTPATIENT)
Dept: OCCUPATIONAL THERAPY | Age: 60
End: 2025-02-19
Payer: MEDICARE

## 2025-02-19 DIAGNOSIS — R06.83 SNORING: ICD-10-CM

## 2025-02-19 DIAGNOSIS — G47.8 OTHER SLEEP DISORDERS: Primary | ICD-10-CM

## 2025-02-21 ENCOUNTER — OFFICE VISIT (OUTPATIENT)
Dept: PODIATRY | Facility: CLINIC | Age: 60
End: 2025-02-21
Payer: MEDICARE

## 2025-02-21 ENCOUNTER — RESULTS FOLLOW-UP (OUTPATIENT)
Dept: ENDOCRINOLOGY | Facility: CLINIC | Age: 60
End: 2025-02-21

## 2025-02-21 ENCOUNTER — APPOINTMENT (OUTPATIENT)
Dept: LAB | Facility: HOSPITAL | Age: 60
End: 2025-02-21
Payer: MEDICARE

## 2025-02-21 ENCOUNTER — OFFICE VISIT (OUTPATIENT)
Dept: OCCUPATIONAL THERAPY | Age: 60
End: 2025-02-21
Payer: MEDICARE

## 2025-02-21 VITALS — BODY MASS INDEX: 39.01 KG/M2 | WEIGHT: 288 LBS | HEIGHT: 72 IN

## 2025-02-21 DIAGNOSIS — E11.65 TYPE 2 DIABETES MELLITUS WITH HYPERGLYCEMIA, WITHOUT LONG-TERM CURRENT USE OF INSULIN (HCC): ICD-10-CM

## 2025-02-21 DIAGNOSIS — E11.22 TYPE 2 DIABETES MELLITUS WITH CHRONIC KIDNEY DISEASE, WITHOUT LONG-TERM CURRENT USE OF INSULIN, UNSPECIFIED CKD STAGE (HCC): ICD-10-CM

## 2025-02-21 DIAGNOSIS — I10 ESSENTIAL HYPERTENSION: ICD-10-CM

## 2025-02-21 DIAGNOSIS — Z89.422 HISTORY OF AMPUTATION OF LESSER TOE, LEFT (HCC): ICD-10-CM

## 2025-02-21 DIAGNOSIS — S61.452D ANIMAL BITE OF LEFT HAND WITH INFECTION, SUBSEQUENT ENCOUNTER: ICD-10-CM

## 2025-02-21 DIAGNOSIS — Z47.89 AFTERCARE FOLLOWING SURGERY OF THE MUSCULOSKELETAL SYSTEM: ICD-10-CM

## 2025-02-21 DIAGNOSIS — Z01.812 PRE-OPERATIVE LABORATORY EXAMINATION: ICD-10-CM

## 2025-02-21 DIAGNOSIS — R39.89 SUSPECTED UTI: ICD-10-CM

## 2025-02-21 DIAGNOSIS — L08.9 ANIMAL BITE OF LEFT HAND WITH INFECTION, SUBSEQUENT ENCOUNTER: ICD-10-CM

## 2025-02-21 DIAGNOSIS — N20.0 RIGHT RENAL STONE: ICD-10-CM

## 2025-02-21 DIAGNOSIS — E11.69 TYPE 2 DIABETES MELLITUS WITH OTHER SPECIFIED COMPLICATION, WITHOUT LONG-TERM CURRENT USE OF INSULIN (HCC): ICD-10-CM

## 2025-02-21 DIAGNOSIS — M25.641 STIFFNESS OF FINGER JOINT OF RIGHT HAND: ICD-10-CM

## 2025-02-21 DIAGNOSIS — E11.9 ENCOUNTER FOR DIABETIC FOOT EXAM (HCC): Primary | ICD-10-CM

## 2025-02-21 DIAGNOSIS — M65.949 TENOSYNOVITIS OF FINGER AND HAND: Primary | ICD-10-CM

## 2025-02-21 DIAGNOSIS — Z12.5 PROSTATE CANCER SCREENING: ICD-10-CM

## 2025-02-21 DIAGNOSIS — D64.9 ANEMIA, UNSPECIFIED TYPE: ICD-10-CM

## 2025-02-21 DIAGNOSIS — M25.631 WRIST STIFFNESS, RIGHT: ICD-10-CM

## 2025-02-21 LAB
ABO GROUP BLD: NORMAL
ALBUMIN SERPL BCG-MCNC: 4.7 G/DL (ref 3.5–5)
ALP SERPL-CCNC: 57 U/L (ref 34–104)
ALT SERPL W P-5'-P-CCNC: 18 U/L (ref 7–52)
ANION GAP SERPL CALCULATED.3IONS-SCNC: 8 MMOL/L (ref 4–13)
AST SERPL W P-5'-P-CCNC: 14 U/L (ref 13–39)
BACTERIA UR QL AUTO: ABNORMAL /HPF
BASOPHILS # BLD AUTO: 0.05 THOUSANDS/ΜL (ref 0–0.1)
BASOPHILS NFR BLD AUTO: 1 % (ref 0–1)
BILIRUB SERPL-MCNC: 0.47 MG/DL (ref 0.2–1)
BILIRUB UR QL STRIP: NEGATIVE
BLD GP AB SCN SERPL QL: NEGATIVE
BUN SERPL-MCNC: 31 MG/DL (ref 5–25)
CALCIUM SERPL-MCNC: 9.7 MG/DL (ref 8.4–10.2)
CHLORIDE SERPL-SCNC: 100 MMOL/L (ref 96–108)
CHOLEST SERPL-MCNC: 185 MG/DL (ref ?–200)
CLARITY UR: CLEAR
CO2 SERPL-SCNC: 26 MMOL/L (ref 21–32)
COLOR UR: ABNORMAL
CREAT SERPL-MCNC: 1.4 MG/DL (ref 0.6–1.3)
CREAT UR-MCNC: 61.5 MG/DL
EOSINOPHIL # BLD AUTO: 0.21 THOUSAND/ΜL (ref 0–0.61)
EOSINOPHIL NFR BLD AUTO: 4 % (ref 0–6)
ERYTHROCYTE [DISTWIDTH] IN BLOOD BY AUTOMATED COUNT: 13.1 % (ref 11.6–15.1)
EST. AVERAGE GLUCOSE BLD GHB EST-MCNC: 206 MG/DL
GFR SERPL CREATININE-BSD FRML MDRD: 53 ML/MIN/1.73SQ M
GLUCOSE P FAST SERPL-MCNC: 271 MG/DL (ref 65–99)
GLUCOSE UR STRIP-MCNC: ABNORMAL MG/DL
HBA1C MFR BLD: 8.8 %
HCT VFR BLD AUTO: 44.3 % (ref 36.5–49.3)
HDLC SERPL-MCNC: 48 MG/DL
HGB BLD-MCNC: 14.8 G/DL (ref 12–17)
HGB UR QL STRIP.AUTO: NEGATIVE
HYALINE CASTS #/AREA URNS LPF: ABNORMAL /LPF
IMM GRANULOCYTES # BLD AUTO: 0.01 THOUSAND/UL (ref 0–0.2)
IMM GRANULOCYTES NFR BLD AUTO: 0 % (ref 0–2)
KETONES UR STRIP-MCNC: NEGATIVE MG/DL
LDLC SERPL CALC-MCNC: 101 MG/DL (ref 0–100)
LEUKOCYTE ESTERASE UR QL STRIP: ABNORMAL
LYMPHOCYTES # BLD AUTO: 1.03 THOUSANDS/ΜL (ref 0.6–4.47)
LYMPHOCYTES NFR BLD AUTO: 21 % (ref 14–44)
MCH RBC QN AUTO: 30.9 PG (ref 26.8–34.3)
MCHC RBC AUTO-ENTMCNC: 33.4 G/DL (ref 31.4–37.4)
MCV RBC AUTO: 93 FL (ref 82–98)
MICROALBUMIN UR-MCNC: 100.5 MG/L
MICROALBUMIN/CREAT 24H UR: 163 MG/G CREATININE (ref 0–30)
MONOCYTES # BLD AUTO: 0.54 THOUSAND/ΜL (ref 0.17–1.22)
MONOCYTES NFR BLD AUTO: 11 % (ref 4–12)
NEUTROPHILS # BLD AUTO: 3.04 THOUSANDS/ΜL (ref 1.85–7.62)
NEUTS SEG NFR BLD AUTO: 63 % (ref 43–75)
NITRITE UR QL STRIP: NEGATIVE
NON-SQ EPI CELLS URNS QL MICRO: ABNORMAL /HPF
NRBC BLD AUTO-RTO: 0 /100 WBCS
PH UR STRIP.AUTO: 5 [PH]
PLATELET # BLD AUTO: 220 THOUSANDS/UL (ref 149–390)
PMV BLD AUTO: 10.4 FL (ref 8.9–12.7)
POTASSIUM SERPL-SCNC: 5 MMOL/L (ref 3.5–5.3)
PROT SERPL-MCNC: 8.1 G/DL (ref 6.4–8.4)
PROT UR STRIP-MCNC: ABNORMAL MG/DL
PSA SERPL-MCNC: 0.62 NG/ML (ref 0–4)
RBC # BLD AUTO: 4.79 MILLION/UL (ref 3.88–5.62)
RBC #/AREA URNS AUTO: ABNORMAL /HPF
RH BLD: POSITIVE
SODIUM SERPL-SCNC: 134 MMOL/L (ref 135–147)
SP GR UR STRIP.AUTO: 1.02 (ref 1–1.03)
SPECIMEN EXPIRATION DATE: NORMAL
TRIGL SERPL-MCNC: 178 MG/DL (ref ?–150)
UROBILINOGEN UR STRIP-ACNC: <2 MG/DL
WBC # BLD AUTO: 4.88 THOUSAND/UL (ref 4.31–10.16)
WBC #/AREA URNS AUTO: ABNORMAL /HPF

## 2025-02-21 PROCEDURE — 97110 THERAPEUTIC EXERCISES: CPT

## 2025-02-21 PROCEDURE — 86900 BLOOD TYPING SEROLOGIC ABO: CPT

## 2025-02-21 PROCEDURE — 36415 COLL VENOUS BLD VENIPUNCTURE: CPT

## 2025-02-21 PROCEDURE — 97140 MANUAL THERAPY 1/> REGIONS: CPT

## 2025-02-21 PROCEDURE — 86850 RBC ANTIBODY SCREEN: CPT

## 2025-02-21 PROCEDURE — 80053 COMPREHEN METABOLIC PANEL: CPT

## 2025-02-21 PROCEDURE — 82043 UR ALBUMIN QUANTITATIVE: CPT

## 2025-02-21 PROCEDURE — 82570 ASSAY OF URINE CREATININE: CPT

## 2025-02-21 PROCEDURE — 86901 BLOOD TYPING SEROLOGIC RH(D): CPT

## 2025-02-21 PROCEDURE — 81001 URINALYSIS AUTO W/SCOPE: CPT

## 2025-02-21 PROCEDURE — 87086 URINE CULTURE/COLONY COUNT: CPT

## 2025-02-21 PROCEDURE — 99213 OFFICE O/P EST LOW 20 MIN: CPT

## 2025-02-21 PROCEDURE — 85025 COMPLETE CBC W/AUTO DIFF WBC: CPT

## 2025-02-21 PROCEDURE — 80061 LIPID PANEL: CPT

## 2025-02-21 PROCEDURE — 83036 HEMOGLOBIN GLYCOSYLATED A1C: CPT

## 2025-02-21 PROCEDURE — G0103 PSA SCREENING: HCPCS

## 2025-02-21 NOTE — PROGRESS NOTES
Daily Note     Today's date: 2025  Patient name: Yoni Castillo  : 1965  MRN: [id number]  Referring provider: Yesenia Carrasquillo PA-C  Dx:   Encounter Diagnosis     ICD-10-CM    1. Tenosynovitis of finger and hand  M65.949       2. Animal bite of left hand with infection, subsequent encounter  S61.452D     L08.9       3. Wrist stiffness, right  M25.631       4. Stiffness of finger joint of right hand  M25.641       5. Aftercare following surgery of the musculoskeletal system  Z47.89             Start Time: 1030  Stop Time: 1113  Total time in clinic (min): 43 minutes    Subjective: Pt reports no new complaints      Objective: See treatment diary below    Wrist flexion: 33 (was 32)  Wrist extension: 44 (was 42)    Assessment: Tolerated treatment well. Pt continues to improve ROM slowly. Instructed on gripping activities to see if we can improve composite digit flexion with wrist motion. Patient would benefit from continued OT      Plan: Continue per plan of care.  Progress treatment as tolerated.       Precautions: R wrist extensor tenolysis, wrist capsulotomy and de quervain's release on     Manuals   re-eval    2/3  2/5 2/10 2/12 2/17 2/21     STM           10' 10 10' 10'    Scar mob              edema     15'  10'  10'  10' 10' 5 5' 5'     Wound care   15'  10'  3'                             Ther Ex                       wrist, digit PROM    x20  3x20  3x20  3x20 2x20 2x20 2x20 2x20    Forearm, wrist, and digit AROM X20  x20 Thumb/finger  AROM/PROM Thumb/finger  AROM/PROM  Thumb/finger  AROM/PROM 2x30s each digit composite flexion and isolated MP flexion 2x30s each digit composite flexion and isolated MP flexion 2x30s each digit composite flexion and isolated MP flexion 2x30s each digit composite flexion and isolated MP flexion    Tendon glides   Comp fist x20 Comp fist x20  Comp fist 2x20 2x20 comp fist 2x20 comp fist 2x20 comp fist 2x20 comp fist    Gentle strengthening            and twist with flex bar    Intrinsic stretches     X20 hook fist with finger spacers                                                                                                                            Ther Activity                   activity modification                                                                                                                     Neuro Re-Ed                                                               Ortho Fit                  Wrist/digit static progressive extension splint       30' 10' adjust                                     Modalities                  HP            5'  5'

## 2025-02-21 NOTE — PROGRESS NOTES
Yoni VALENCIA [facility]  1965  AT RISK FOOT CARE    1. Encounter for diabetic foot exam ([facility])        2. Type 2 diabetes mellitus with chronic kidney disease, without long-term current use of insulin, unspecified CKD stage ([facility])        3. History of amputation of lesser toe, left (HCC)            Patient presents for at-risk foot care.  Patient has no acute concerns today.  Patient has significant lower extremity risk due to previous amputation.    Callus: 0  Amputation: left 5th digit amputation    Today's treatment includes:  Diagnosis and options discussed with patient  Patient agreeable to the plan as stated below    -DM foot risk is high. Recommend 3-6 month follow up  -Discussed DM risk to lower extremities, proper shoe gear, and daily monitoring of feet.   -Discussed weight loss and suitable exercise regiment.   -If the patient notices any systemic signs of infection including but not limited to fever, chills, nausea, vomiting or significant worsening of wound with increased drainage, redness or streaking up the foot or leg the patient should notify the office or present to the emergency room.   -Reviewed most recent PCP visit on 11/13/2024  -Educated on A1C and the risks of poorly controlled Diabetes. Reviewed recent A1C:  Lab Results   Component Value Date    HGBA1C 6.4 10/30/2024    HGBA1C 8.0 (H) 06/12/2024   .     Discussed proper shoe gear, daily inspections of feet, and general foot health with patient. Patient has Q7  findings and is recommended for at risk foot care every 9-10 weeks.    Patients most recent complete clinical exam was performed: today 2/21/2025    Diabetic Foot Exam    Patient's shoes and socks removed.    Right Foot/Ankle   Right Foot Inspection  Skin Exam: skin normal and skin intact. No dry skin, no warmth, no callus, no erythema, no maceration, no abnormal color, no pre-ulcer, no ulcer and no callus.     Toe Exam: ROM and strength within normal limits.     Sensory   Monofilament  testing: diminished    Vascular  The right DP pulse is 2+. The right PT pulse is 2+.     Left Foot/Ankle  Left Foot Inspection  Amputation: amputation left foot (Comments: left partial 5th ray resection)    Toe Exam: left toe deformity.     Sensory   Monofilament testing: diminished    Vascular  The left DP pulse is 2+. The left PT pulse is 2+.     Assign Risk Category  Deformity present  Loss of protective sensation  No weak pulses  Risk: 3

## 2025-02-22 LAB — BACTERIA UR CULT: NORMAL

## 2025-02-24 ENCOUNTER — OFFICE VISIT (OUTPATIENT)
Dept: OCCUPATIONAL THERAPY | Age: 60
End: 2025-02-24
Payer: MEDICARE

## 2025-02-24 DIAGNOSIS — M25.631 WRIST STIFFNESS, RIGHT: ICD-10-CM

## 2025-02-24 DIAGNOSIS — M54.40 LOW BACK PAIN WITH SCIATICA, SCIATICA LATERALITY UNSPECIFIED, UNSPECIFIED BACK PAIN LATERALITY, UNSPECIFIED CHRONICITY: ICD-10-CM

## 2025-02-24 DIAGNOSIS — M65.949 TENOSYNOVITIS OF FINGER AND HAND: Primary | ICD-10-CM

## 2025-02-24 DIAGNOSIS — Z47.89 AFTERCARE FOLLOWING SURGERY OF THE MUSCULOSKELETAL SYSTEM: ICD-10-CM

## 2025-02-24 DIAGNOSIS — E11.69 TYPE 2 DIABETES MELLITUS WITH OTHER SPECIFIED COMPLICATION, WITHOUT LONG-TERM CURRENT USE OF INSULIN (HCC): ICD-10-CM

## 2025-02-24 DIAGNOSIS — M25.641 STIFFNESS OF FINGER JOINT OF RIGHT HAND: ICD-10-CM

## 2025-02-24 DIAGNOSIS — S61.452D ANIMAL BITE OF LEFT HAND WITH INFECTION, SUBSEQUENT ENCOUNTER: ICD-10-CM

## 2025-02-24 DIAGNOSIS — L08.9 ANIMAL BITE OF LEFT HAND WITH INFECTION, SUBSEQUENT ENCOUNTER: ICD-10-CM

## 2025-02-24 DIAGNOSIS — I10 ESSENTIAL HYPERTENSION: ICD-10-CM

## 2025-02-24 PROCEDURE — 97110 THERAPEUTIC EXERCISES: CPT

## 2025-02-24 PROCEDURE — 97530 THERAPEUTIC ACTIVITIES: CPT

## 2025-02-24 PROCEDURE — 97140 MANUAL THERAPY 1/> REGIONS: CPT

## 2025-02-24 NOTE — PROGRESS NOTES
OT Re-evaluation    Today's date: 2025  Patient name: Yoni Castillo  : 1965  MRN: [id number]  Referring provider: Yesenia Carrasquillo PA-C  Dx:   Encounter Diagnosis     ICD-10-CM    1. Tenosynovitis of finger and hand  M65.949       2. Animal bite of left hand with infection, subsequent encounter  S61.452D     L08.9       3. Wrist stiffness, right  M25.631       4. Stiffness of finger joint of right hand  M25.641       5. Aftercare following surgery of the musculoskeletal system  Z47.89             Start Time: 1148  Stop Time: 1240  Total time in clinic (min): 52 minutes    Subjective: Pt reports no new complaints      Objective: See treatment diary below    Wrist flexion: 35   Wrist extension: 42     Right Hand (ext/flex)    D2 D3 D4 D5   MCP 48 55 52 45   PIP 80 80 80 76   DIP 50 58 30 37         Assessment: Tolerated treatment well. Pt demonstrates slow progress over course of therapy, however his wrist and digit AROM is improving. He can make about 1/2 a fist at this point. Pt reports pain and stiffness on the palmar side of his hand with AROM exercises that try to force him into a fisted position. OT recommends pt continue 2-3x/week for 8-12 weeks to progress ROM /strength. Patient would benefit from continued OT    Goals  Short term goals:  To be achieved within 3-4 visits from start of care on .   Patient will demonstrate independence with scar mobilization techniques and post-op wound care instructions.   Patient will demonstrate independence with all AROM and stretching HEPs.  Patient will verbalize understanding of activity limitations within post-op precautions for improved symptom management.   Patient will verbalize understanding of activity modifications to maximize functional use of right hand throughout normal routine.   Patient will tolerate therapeutic exercises/activities with reports of pain <2/10.    ALL SHORT TERM GOALS MET        Long term goals:  To be achieved at time of  discharge:   Patient will demonstrate improved AROM to WFL compared to uninvolved side.  Patient will begin to report decreased pain with completion of ADLs (donning shoes/dressing, etc.).   Patient will begin to report improved completion of IADLs (cooking, washing dishes, cleaning, etc.) with implementation of recommended symptom management strategies.   Patient will begin to report improved participation/engagement in desired leisure occupations .   Patient will demonstrate improvements with  and pinch strength to within 25% of their uninvolved side for increased readiness to return to independence in ADLs and IADLs.   Patient will report readiness for discharge from OT services.    ALL LONG TERM GOALS ARE PROGRESSING          Plan  Patient would benefit from: skilled occupational therapy  Referral necessary: Yes  Planned modality interventions: thermotherapy: hydrocollator packs and ultrasound  Other planned modality interventions: IASTM     Planned therapy interventions: IADL retraining, manual therapy, activity modification, fine motor coordination training, flexibility, functional ROM exercises, home exercise program, graded exercise, graded activity, therapeutic exercise, therapeutic activities, stretching, strengthening, patient education, orthotic fitting/training, joint mobilization, orthotic management and training, work reintegration, dressing changes, balance/weight bearing training and ADL retraining     Frequency: 2-3x week  Duration in weeks: 12  Plan of Care beginning date: 2/24/2025  Plan of Care expiration date: 5/26/2025  Treatment plan discussed with: patient       Precautions: R wrist extensor tenolysis, wrist capsulotomy and de quervain's release on 1/20    Manuals  1/22 re-eval  1/24 1/27  2/3  2/5 2/10 2/12 2/17 2/21 2/24 re-eval    STM           10' 10 10' 10' 10'   Scar mob              edema     15'  10'  10'  10' 10' 5 5' 5' 5'    Wound care   15'  10'  3'                              Ther Ex                       wrist, digit PROM    x20  3x20  3x20  3x20 2x20 2x20 2x20 2x20 2x20   Forearm, wrist, and digit AROM X20  x20 Thumb/finger  AROM/PROM Thumb/finger  AROM/PROM  Thumb/finger  AROM/PROM 2x30s each digit composite flexion and isolated MP flexion 2x30s each digit composite flexion and isolated MP flexion 2x30s each digit composite flexion and isolated MP flexion 2x30s each digit composite flexion and isolated MP flexion 2x30s each digit composite flexion and isolated MP flexion   Tendon glides   Comp fist x20 Comp fist x20  Comp fist 2x20 2x20 comp fist 2x20 comp fist 2x20 comp fist 2x20 comp fist 2x20 comp fist   Gentle strengthening           and twist with flex bar  and twist with flex bar   Intrinsic stretches     X20 hook fist with finger spacers                                                                                                                            Ther Activity                   activity modification                  Fine motor/coordination             Nuts/bolts                                                                                      Neuro Re-Ed                                                               Ortho Fit                  Wrist/digit static progressive extension splint       30' 10' adjust                                     Modalities                  HP            5'  5' 5'

## 2025-02-25 ENCOUNTER — ANESTHESIA EVENT (OUTPATIENT)
Dept: PERIOP | Facility: HOSPITAL | Age: 60
End: 2025-02-25
Payer: MEDICARE

## 2025-02-25 ENCOUNTER — OFFICE VISIT (OUTPATIENT)
Dept: PAIN MEDICINE | Facility: CLINIC | Age: 60
End: 2025-02-25
Payer: MEDICARE

## 2025-02-25 VITALS — WEIGHT: 288.8 LBS | BODY MASS INDEX: 39.12 KG/M2 | HEIGHT: 72 IN

## 2025-02-25 DIAGNOSIS — M51.369 DDD (DEGENERATIVE DISC DISEASE), LUMBAR: ICD-10-CM

## 2025-02-25 DIAGNOSIS — M54.16 LUMBAR RADICULITIS: Primary | ICD-10-CM

## 2025-02-25 DIAGNOSIS — M54.12 CERVICAL RADICULOPATHY: ICD-10-CM

## 2025-02-25 PROCEDURE — 99214 OFFICE O/P EST MOD 30 MIN: CPT | Performed by: ANESTHESIOLOGY

## 2025-02-25 PROCEDURE — G2211 COMPLEX E/M VISIT ADD ON: HCPCS | Performed by: ANESTHESIOLOGY

## 2025-02-25 RX ORDER — METHOCARBAMOL 750 MG/1
750 TABLET, FILM COATED ORAL DAILY PRN
Qty: 30 TABLET | Refills: 0 | Status: SHIPPED | OUTPATIENT
Start: 2025-02-25

## 2025-02-25 RX ORDER — CARVEDILOL 25 MG/1
25 TABLET ORAL 2 TIMES DAILY WITH MEALS
Qty: 200 TABLET | Refills: 1 | Status: SHIPPED | OUTPATIENT
Start: 2025-02-25

## 2025-02-25 RX ORDER — LIDOCAINE 50 MG/G
1 PATCH TOPICAL DAILY
Qty: 30 PATCH | Refills: 0 | Status: SHIPPED | OUTPATIENT
Start: 2025-02-25

## 2025-02-25 RX ORDER — LISINOPRIL AND HYDROCHLOROTHIAZIDE 12.5; 2 MG/1; MG/1
1 TABLET ORAL 2 TIMES DAILY
Qty: 200 TABLET | Refills: 1 | Status: SHIPPED | OUTPATIENT
Start: 2025-02-25

## 2025-02-25 NOTE — PROGRESS NOTES
Assessment:  1. Lumbar radiculitis    2. Cervical radiculopathy    3. DDD (degenerative disc disease), lumbar      Patient presenting with chronic neck and low back pain with associated radiating arm and leg symptoms for greater than 1 year, worsening over the past several months.    Pain is consistent with cervical and lumbar radicular pain accompanied by pain >7/10 on the pain scale with inability to participate in IADLs for >6 weeks. Patient has participated with physical therapy without benefit.  Patient is also taking Tramadol, Robaxin, Tylenol, with modest benefit. Denies any bowel or bladder incontinence, saddle anesthesia.     In regards to the patient's pathology, we discussed the various treatment options including physical therapy, chiropractic treatment, medication management, activity modifications, interventional spine procedures.  Given that patient has not had any benefit with conservative treatments, I think patient would benefit from targeted interventional treatment modalities.     Independently reviewed and interpreted cervical and lumbar MRIs.  Prior cervical MRI showed multilevel degenerative changes with herniations at C4-5, C5-6 and C6-7 with varying spinal and foraminal stenosis. There is a component of congenital spinal stenosis as well.  Lumbar MRI showed a right-sided disc protrusion at L4-5 with displacement of the right L5 nerve root.  There is also left foraminal stenosis at L5-S1.     Plan:     Discussed and offered a cervical epidural steroid injection as well as a lumbar epidural steroid injection previously and again today.    He states that he does not wish to pursue injections at this time- he is aware he can contact the office for ESIs going forward as needed.    He is requesting a Robaxin refill which was previously managed by Orthopedics. Will provide 1 time refill, patient was informed he should obtain further refills from PCP as he is on a stable regimen.     Reviewed  external notes from PCP, orthopedic surgery in regards to recent and prior relevant medical histories, treatment recommendations, medication and/or interventional treatment responses.     Reviewed hemoglobin A1c, renal function, CBC and/or PT/INR prior to discussing/offering interventional modalities.     My impressions and treatment recommendations were discussed in detail with the patient who verbalized understanding and had no further questions.  Discharge instructions were provided. I personally saw and examined the patient and I agree with the above discussed plan of care.    No orders of the defined types were placed in this encounter.    New Medications Ordered This Visit   Medications    methocarbamol (ROBAXIN) 750 mg tablet     Sig: Take 1 tablet (750 mg total) by mouth daily as needed for muscle spasms     Dispense:  30 tablet     Refill:  0       History of Present Illness:  Yoni Castillo is a 60 y.o. male who presents for a follow up office visit in regards to Neck Pain and Back Pain.   The patient’s current symptoms include worsening back with radiating leg pain symptoms. Pain is rated 8/10 and described as a constant sharp, throbbing, cramping, shooting pain with numbness, pins/needles and spasms.    I have personally reviewed and/or updated the patient's past medical history, past surgical history, family history, social history, current medications, allergies, and vital signs today.     Review of Systems   Constitutional:  Negative for chills and fever.   HENT:  Negative for ear pain and sore throat.    Eyes:  Negative for pain and visual disturbance.   Respiratory:  Negative for cough and shortness of breath.    Cardiovascular:  Negative for chest pain and palpitations.   Gastrointestinal:  Negative for abdominal pain and vomiting.   Genitourinary:  Negative for dysuria and hematuria.   Musculoskeletal:  Positive for arthralgias, back pain, gait problem and myalgias.   Skin:  Negative for color  change and rash.   Neurological:  Positive for weakness and numbness. Negative for seizures and syncope.   All other systems reviewed and are negative.      Patient Active Problem List   Diagnosis    Essential hypertension    Type 2 diabetes mellitus with chronic kidney disease, without long-term current use of insulin (HCC)    Class 2 severe obesity with serious comorbidity in adult (HCC)    Low back pain with sciatica    Cervical radiculopathy    Neuropathy    Chronic kidney disease, stage 3 (HCC)    Ventral hernia without obstruction or gangrene    Incisional hernia without obstruction or gangrene    Acquired absence of other left toe(s) (HCC)    Dupuytren's disease of finger with nodules without contracture    Dog bite    Staghorn calculus    Mucoid cyst of joint    Preoperative cardiovascular examination    Tenosynovitis of finger and hand    Wrist stiffness, right    Stiffness of finger joint of right hand    Anemia       Past Medical History:   Diagnosis Date    Arthritis 2012    Chronic kidney disease 2012    Chronic pain disorder     Diabetes mellitus (HCC)     H/O eye surgery     High blood sugar     Hypertension     Kidney stone     Liver disease     Neuropathy in diabetes (HCC)        Past Surgical History:   Procedure Laterality Date    AMPUTATION  2022    Little toe    COLONOSCOPY      CONTRACTURE Right 1/20/2025    Procedure: Right wrist and hand extensor tenolysis and wrist capsulotomy;  Surgeon: Carroll Mccarthy MD;  Location: [facility];  Service: Orthopedics    FOOT SURGERY  2022    Left Foot    GANGLION CYST EXCISION Left 03/25/2024    Procedure: EXCISION MUCOID CYST, INDEX FINGER DIP;  Surgeon: Carroll Mccarthy MD;  [facility];  Service: Orthopedics    GANGLION CYST EXCISION Right 05/20/2024    Procedure: EXCISION MUCOID CYST - RIGHT INDEX AND MIDDLE FINGERS;  Surgeon: Carroll Mccarthy MD;  [facility];  Service: Orthopedics    HERNIA REPAIR      INCISION AND  DRAINAGE  01/16/2024    KIDNEY SURGERY      KNEE SURGERY  1980    MOUTH SURGERY      MS AMPUTATION METATARSAL W/TOE SINGLE Left 6/1/2022    Procedure: RAY RESECTION FOOT;  Surgeon: Hannah Melgoza DPM;  [facility];  Service: Podiatry    MS INCISION & DRAINAGE ABSCESS COMPLICATED/MULTIPLE Right 09/17/2024    Procedure: Irrigation and debridement right wrist and hand, any indicated procedures;  Surgeon: Carroll Mccarthy MD;  [facility];  Service: Orthopedics    MS INCISION EXTENSOR TENDON SHEATH WRIST Right 1/20/2025    Procedure: RELEASE DEQUERVAINS - Right;  Surgeon: Carroll Mccarthy MD;  [facility];  Service: Orthopedics    MS RPR AA HERNIA 1ST < 3 CM REDUCIBLE N/A 7/14/2023    Procedure: REPAIR HERNIA INCISIONAL;  Surgeon: Matt Melo MD;  [facility];  Service: General    MS SYNOVECTOMY EXTENSOR TENDON SHTH WRIST 1 CMPRT Right 10/03/2024    Procedure: Irrigation and debridement, right wrist, volar and dorsal, possible extensor and flexor tenosynovectomy, any indicated procedures;  Surgeon: Carroll Mccarthy MD;  [facility];  Service: Orthopedics    TONSILLECTOMY  1968    Yes    WOUND DEBRIDEMENT Left 4/28/2022    Procedure: Left foot washout;  Surgeon: Hannah Melgoza DPM;  [facility];  Service: Podiatry    WOUND DEBRIDEMENT Left 6/6/2022    Procedure: DEBRIDEMENT WOUND (WASH OUT);  Surgeon: Hannah Melgoza DPM;  [facility];  Service: Podiatry       Family History   Problem Relation Age of Onset    Diabetes Paternal Grandmother     Diabetes Paternal Grandfather     Arthritis Maternal Grandmother        Social History     Occupational History    Not on file   Tobacco Use    Smoking status: Never    Smokeless tobacco: Never   Vaping Use    Vaping status: Never Used   Substance and Sexual Activity    Alcohol use: Yes     Alcohol/week: 1.0 standard drink of alcohol     Types: 1 Cans of beer per week     Comment: ocassional     Drug use: Never    Sexual activity: Not Currently     Partners: Female     Birth control/protection: Abstinence       Current Outpatient Medications on File Prior to Visit   Medication Sig    Accu-Chek FastClix Lancets MISC Use to test blood sugar once a day    acetaminophen (TYLENOL) 500 mg tablet Take 2 tablets (1,000 mg total) by mouth every 8 (eight) hours as needed for mild pain or moderate pain    Acetylcysteine (NAC PO) Take 300 mg by mouth 2 (two) times a day    Alpha-Lipoic Acid 600 MG CAPS Take 600 mg by mouth 2 (two) times a day      amitriptyline (ELAVIL) 10 mg tablet start 10mg at bedtime. Increase by 10mg each week until good effect on headaches/pain or reach 50mg daily    ammonium lactate (LAC-HYDRIN) 12 % lotion 2 (two) times a day as needed    Apple Cider Vinegar 300 MG TABS Take 300 mg by mouth 2 (two) times a day    Ascorbic Acid (vitamin C) 1000 MG tablet Take 1,000 mg by mouth 2 (two) times a day 2 tablet twice a day    BENFOTIAMINE PO Take 300 mg by mouth 2 (two) times a day (Patient taking differently: Take 300 mg by mouth 2 (two) times a day 4 twice daily)    beta carotene 30 MG capsule Take 30 mg by mouth 2 (two) times a day      Blood Glucose Monitoring Suppl (Accu-Chek Guide Me) w/Device KIT Use to check blood sugar once a day    carvedilol (COREG) 25 mg tablet Take 1 tablet (25 mg total) by mouth 2 (two) times a day with meals    Chromium Picolinate 200 MCG TABS Take 200 mcg by mouth 2 (two) times a day    Empagliflozin (Jardiance) 25 MG TABS Take 1 tablet (25 mg total) by mouth daily    Garlic 1200 MG CAPS Take by mouth 2 (two) times a day     glucose blood (Accu-Chek Guide) test strip Use to check blood sugar once a day    IRON, FERROUS SULFATE, PO Take 25 mg by mouth 2 (two) times a day    ketoconazole (NIZORAL) 2 % cream if needed    lidocaine (Lidoderm) 5 % Apply 1 patch topically over 12 hours daily Remove & Discard patch within 12 hours or as directed by MD     lisinopril-hydrochlorothiazide (PRINZIDE,ZESTORETIC) 20-12.5 MG per tablet Take 1 tablet by mouth 2 (two) times a day    Lutein-Bilberry (LUTEIN PLUS BILBERRY PO) Take by mouth 2 (two) times a day    NON FORMULARY Take 150 mg by mouth 2 (two) times a day BLUEBERRY    Pyridoxine HCl (B-6 PO) Take by mouth 2 (two) times a day    TART CHERRY PO Take 150 mg by mouth 2 (two) times a day    traMADol (Ultram) 50 mg tablet Take 2 tablets (100 mg total) by mouth daily at bedtime as needed for severe pain    Turmeric (QC TUMERIC COMPLEX PO) Take 1,000 mg by mouth 2 (two) times a day Tumeric /curcimin    Ubrogepant (UBRELVY) 100 MG tablet Take 1 tablet (100 mg) one time as needed for migraine. May repeat one additional tablet (100 mg) at least two hours after the first dose. Do not use more than two doses per day, or for more than twelve days per month.    VITAMIN D PO Take by mouth 2 (two) times a day    vitamin E, tocopherol, 400 units capsule Take 400 Units by mouth 2 (two) times a day    Zinc 50 MG CAPS Take by mouth 2 (two) times a day     [DISCONTINUED] methocarbamol (ROBAXIN) 750 mg tablet Take 2 tablets (1,500 mg total) by mouth every 8 (eight) hours    Bismuth Tribromoph-Petrolatum (Xeroform Oil Emulsion Strip) MISC Apply topically 3 (three) times a day as needed (As needed for dressing changes) (Patient not taking: Reported on 2/25/2025)    chlorhexidine gluconate (HIBICLENS) 4 % external liquid Apply 1 Application topically daily as needed for wound care (Patient not taking: Reported on 2/25/2025)    dulaglutide (Trulicity) 0.75 MG/0.5ML injection Inject 0.5 mL (0.75 mg total) under the skin every 7 days (Patient not taking: Reported on 2/13/2025)    ketorolac (ACULAR) 0.5 % ophthalmic solution  (Patient not taking: Reported on 2/13/2025)    mupirocin (BACTROBAN) 2 % ointment Apply topically 3 (three) times a day Apply to site of dog bite 3 times daily (Patient not taking: Reported on 2/25/2025)    oxyCODONE  (Roxicodone) 5 immediate release tablet Take 1 tablet (5 mg total) by mouth every 6 (six) hours as needed for severe pain Max Daily Amount: 20 mg (Patient not taking: Reported on 2/4/2025)     No current facility-administered medications on file prior to visit.       Allergies   Allergen Reactions    Cephalexin Hives     Skin peeling  Tolerates penicillins (tolerated unasyn and zosyn)    Metformin GI Intolerance    Pollen Extract Sneezing       Physical Exam:    Ht 6' (1.829 m)   Wt 131 kg (288 lb 12.8 oz)   BMI 39.17 kg/m²     Constitutional:normal, well developed, well nourished, alert, in no distress and non-toxic and no overt pain behavior.  Eyes:anicteric  HEENT:grossly intact  Neck:supple, symmetric, trachea midline and no masses   Pulmonary:even and unlabored  Cardiovascular:No edema or pitting edema present  Skin:Normal without rashes or lesions and well hydrated  Psychiatric:Mood and affect appropriate  Neurologic: Motor function is grossly intact with no focal neurologic deficits   Musculoskeletal: Limited lumbar spine ROM    Imaging    MRI lumbar spine:     FINDINGS:     VERTEBRAL BODIES:  There are 5 lumbar type vertebral bodies. Mild smooth levoscoliosis. No spondylolisthesis or spondylolysis. No compression fracture. Normal marrow signal is identified within the visualized bony structures.  No discrete marrow lesion.     SACRUM:  Normal signal within the sacrum. No evidence of insufficiency or stress fracture.     DISTAL CORD AND CONUS:  Normal size and signal within the distal cord and conus.     PARASPINAL SOFT TISSUES:  Paraspinal soft tissues are unremarkable.     LOWER THORACIC DISC SPACES:  Normal disc height and signal.  No disc herniation, canal stenosis or foraminal narrowing.     LUMBAR DISC SPACES:     L1-L2:  Normal.     L2-L3: Disc desiccation without loss of disc height. There is a broad-based far lateral disc herniation with endplate hypertrophic change noted. No canal stenosis. No  foraminal nerve impingement.     L3-L4: Slight disc desiccation and loss of disc height with mild circumferential annular bulging asymmetric towards the left. Minor facet arthropathy. No canal stenosis. Mild bilateral foraminal narrowing.     L4-L5: Disc desiccation. Diffuse annular bulging with a focal central disc herniation with mass effect upon the ventral aspect of the thecal sac. This has enlarged when compared with the prior examination with increasing mass effect. Mild bilateral   foraminal narrowing, right slightly greater than left. Correlate for right L5 radiculopathy.     L5-S1: Mild diffuse annular bulging with a small broad-based left foraminal and far lateral disc protrusion. Moderate bilateral facet arthropathy is present. There is no canal stenosis. Moderate left and mild right foraminal narrowing. On the left there   is mild mass effect upon the ventral aspect of the exiting nerve similar to the prior examination. Correlate for left L5 radiculopathy.     OTHER FINDINGS: Small renal cysts are seen bilaterally.     IMPRESSION:     Enlarging right paramedian disc herniation at the L4-5 level with mass effect upon the anterior lateral aspect of the thecal sac and right L5 nerve.     Stable lumbar degenerative disc disease at L5-S1 with broad-based left foraminal disc protrusion

## 2025-02-26 NOTE — TELEPHONE ENCOUNTER
----- Message from Adis Hamilton MD sent at 2/26/2025  8:21 AM EST -----  Can we call Mr. Castillo and let him know that his blood sugars are higher than they have been in years and he needs to keep them under better control before surgery?  ----- Message -----  From: Lab, Background User  Sent: 2/21/2025   1:13 PM EST  To: Adis Hamilton MD

## 2025-02-26 NOTE — TELEPHONE ENCOUNTER
Voicemail message left for the patient asking to call the office to discuss his blood sugar levels.  A1C 8.8 on 2/21/2025.

## 2025-02-27 ENCOUNTER — TELEPHONE (OUTPATIENT)
Age: 60
End: 2025-02-27

## 2025-02-27 NOTE — TELEPHONE ENCOUNTER
Voicemail message left for the patient stating his A1C is high and sometimes anesthesia will cancel the surgery due to the A1C being too high.  Advised the patient for the next several days to try and obtain better control of his sugars.  Patient's surgery scheduled for 3/3/2025 [facility].

## 2025-02-27 NOTE — TELEPHONE ENCOUNTER
Patient returned call regarding his A1C for his upcoming procedure. He would like to speak with clinical staff.    Pt callback: [phone]

## 2025-02-27 NOTE — TELEPHONE ENCOUNTER
Called and spoke with the patient who wanted to let Dr Hamilton  know he had 2 dogs die and a family crisis in the past several weeks.  Patient states he had much difficulty dealing with his dogs passing and he started eating ice cream.  Patient has been doing better with his diet at this time and knows his A1C is coming down.

## 2025-02-27 NOTE — RESULT ENCOUNTER NOTE
Kaila Sands15 hours ago (4:28 PM)     JG  Summary: pt returned missed call   Pt returned missed call.     Pt cb: [phone]

## 2025-02-28 ENCOUNTER — TELEPHONE (OUTPATIENT)
Dept: UROLOGY | Facility: CLINIC | Age: 60
End: 2025-02-28

## 2025-02-28 ENCOUNTER — OFFICE VISIT (OUTPATIENT)
Dept: OCCUPATIONAL THERAPY | Age: 60
End: 2025-02-28
Payer: MEDICARE

## 2025-02-28 DIAGNOSIS — Z47.89 AFTERCARE FOLLOWING SURGERY OF THE MUSCULOSKELETAL SYSTEM: ICD-10-CM

## 2025-02-28 DIAGNOSIS — M25.641 STIFFNESS OF FINGER JOINT OF RIGHT HAND: ICD-10-CM

## 2025-02-28 DIAGNOSIS — N20.0 RIGHT NEPHROLITHIASIS: Primary | ICD-10-CM

## 2025-02-28 DIAGNOSIS — S61.452D ANIMAL BITE OF LEFT HAND WITH INFECTION, SUBSEQUENT ENCOUNTER: ICD-10-CM

## 2025-02-28 DIAGNOSIS — L08.9 ANIMAL BITE OF LEFT HAND WITH INFECTION, SUBSEQUENT ENCOUNTER: ICD-10-CM

## 2025-02-28 DIAGNOSIS — M65.949 TENOSYNOVITIS OF FINGER AND HAND: Primary | ICD-10-CM

## 2025-02-28 DIAGNOSIS — M25.631 WRIST STIFFNESS, RIGHT: ICD-10-CM

## 2025-02-28 PROCEDURE — 97140 MANUAL THERAPY 1/> REGIONS: CPT

## 2025-02-28 PROCEDURE — 97110 THERAPEUTIC EXERCISES: CPT

## 2025-02-28 PROCEDURE — 97530 THERAPEUTIC ACTIVITIES: CPT

## 2025-02-28 NOTE — PROGRESS NOTES
"Daily Note     Today's date: 2025  Patient name: Yoni Castillo  : 1965  MRN: [id number]  Referring provider: Yesenia Carrasquillo PA-C  Dx:   Encounter Diagnosis     ICD-10-CM    1. Tenosynovitis of finger and hand  M65.949       2. Animal bite of left hand with infection, subsequent encounter  S61.452D     L08.9       3. Wrist stiffness, right  M25.631       4. Stiffness of finger joint of right hand  M25.641       5. Aftercare following surgery of the musculoskeletal system  Z47.89             Start Time: 1005  Stop Time: 1100  Total time in clinic (min): 55 minutes    Subjective: \"The strength is coming back\"      Objective: See treatment diary below      Assessment: Tolerated treatment well. Pt reports he has been increasing use of right hand and feels he has been regaining some strength. Pt remains with stiffness in wrist and digits, is still unable to make full composite fist. Patient would benefit from continued OT      Plan: Continue per plan of care.  Progress treatment as tolerated.       Precautions: R wrist extensor tenolysis, wrist capsulotomy and de quervain's release on     Manuals   re-eval  re-eval     STM   10' 10'   Scar mob       edema    5' 5'    Wound care               Ther Ex            wrist, digit PROM   2x20 2x20   Forearm, wrist, and digit AROM X20  2x30s each digit composite flexion and isolated MP flexion 2x30s each digit composite flexion and isolated MP flexion   Tendon glides  2x20 comp fist 2x20 comp fist   Gentle strengthening   and twist with flex bar Yellow  and twist with flex bar   Intrinsic stretches                                                          Ther Activity        activity modification       Fine motor/coordination  Nuts/bolts Large pegs with green pegboard                                   Neuro Re-Ed                           Ortho Fit       Wrist/digit static progressive extension splint                    Modalities       HP   " 5' 5'

## 2025-02-28 NOTE — TELEPHONE ENCOUNTER
Unfortunately we had to call patient and cancel Mondays PCNL.  Ir referral was never placed till today.  I did reach out to IR to see if they can put the tubes in Monday am however they were not able to.  Patient was offered a ureteroscopy however he declined due to issues in the past.  Patient was ok with being rescheduled to April after we talked I calmed him down.  I told him if he needs anything more to contact me.  He will have to do a u/c an type and screen prior.

## 2025-03-03 ENCOUNTER — ANESTHESIA (OUTPATIENT)
Dept: PERIOP | Facility: HOSPITAL | Age: 60
End: 2025-03-03
Payer: MEDICARE

## 2025-03-04 ENCOUNTER — PREP FOR PROCEDURE (OUTPATIENT)
Dept: INTERVENTIONAL RADIOLOGY/VASCULAR | Facility: CLINIC | Age: 60
End: 2025-03-04

## 2025-03-04 DIAGNOSIS — N20.0 STAGHORN CALCULUS: Primary | ICD-10-CM

## 2025-03-04 RX ORDER — SODIUM CHLORIDE 9 MG/ML
75 INJECTION, SOLUTION INTRAVENOUS CONTINUOUS
OUTPATIENT
Start: 2025-03-04

## 2025-03-04 RX ORDER — LEVOFLOXACIN 5 MG/ML
750 INJECTION, SOLUTION INTRAVENOUS ONCE
OUTPATIENT
Start: 2025-03-04 | End: 2025-03-04

## 2025-03-06 ENCOUNTER — OFFICE VISIT (OUTPATIENT)
Dept: OCCUPATIONAL THERAPY | Age: 60
End: 2025-03-06
Payer: MEDICARE

## 2025-03-06 DIAGNOSIS — M25.641 STIFFNESS OF FINGER JOINT OF RIGHT HAND: ICD-10-CM

## 2025-03-06 DIAGNOSIS — M25.631 WRIST STIFFNESS, RIGHT: ICD-10-CM

## 2025-03-06 DIAGNOSIS — S61.452D ANIMAL BITE OF LEFT HAND WITH INFECTION, SUBSEQUENT ENCOUNTER: ICD-10-CM

## 2025-03-06 DIAGNOSIS — Z47.89 AFTERCARE FOLLOWING SURGERY OF THE MUSCULOSKELETAL SYSTEM: ICD-10-CM

## 2025-03-06 DIAGNOSIS — M65.949 TENOSYNOVITIS OF FINGER AND HAND: Primary | ICD-10-CM

## 2025-03-06 DIAGNOSIS — L08.9 ANIMAL BITE OF LEFT HAND WITH INFECTION, SUBSEQUENT ENCOUNTER: ICD-10-CM

## 2025-03-06 PROCEDURE — 97140 MANUAL THERAPY 1/> REGIONS: CPT

## 2025-03-06 PROCEDURE — 97110 THERAPEUTIC EXERCISES: CPT

## 2025-03-06 NOTE — PROGRESS NOTES
"Daily Note     Today's date: 3/6/2025  Patient name: Yoni Castillo  : 1965  MRN: [id number]  Referring provider: Yesenia Carrasquillo PA-C  Dx:   Encounter Diagnosis     ICD-10-CM    1. Tenosynovitis of finger and hand  M65.949       2. Animal bite of left hand with infection, subsequent encounter  S61.452D     L08.9       3. Wrist stiffness, right  M25.631       4. Stiffness of finger joint of right hand  M25.641       5. Aftercare following surgery of the musculoskeletal system  Z47.89             Start Time: 1400  Stop Time: 1445  Total time in clinic (min): 45 minutes    Subjective: \"It's about the same.\"      Objective: See treatment diary below      Assessment: Tolerated treatment well. Pt denies change in symptoms however he demonstrated improved ability to make a fist around the dowel during exercises today. There was also slightly less resistance with passive flexion stretches of his fingers. Of note, pt has a small pus filled spot on the dorsum of his hand at the distal part of his incision that appears to be a stitch working its way to the surface. Small amount of purulent drainage today. Notified provider and advised pt to monitor for changes in redness or pain and to keep the area clean. Patient would benefit from continued OT      Plan: Continue per plan of care.  Progress treatment as tolerated.       Precautions: R wrist extensor tenolysis, wrist capsulotomy and de quervain's release on     Manuals   re-eval  re-eval 2/28 3/6      STM   10' 10' 10'     Scar mob          edema    5' 5' 5'      Wound care                     Ther Ex               wrist, digit PROM   2x20 2x20 2x20     Forearm, wrist, and digit AROM X20  2x30s each digit composite flexion and isolated MP flexion 2x30s each digit composite flexion and isolated MP flexion 2x30s each digit composite flexion and isolated MP flexion     Tendon glides  2x20 comp fist 2x20 comp fist 2x20 comp fist     Gentle strengthening   " and twist with flex bar Yellow  and twist with flex bar Large and small sized dowel press into red theraputty.     Intrinsic stretches                                                                                     Ther Activity           activity modification          Fine motor/coordination  Nuts/bolts Large pegs with green pegboard                                                     Neuro Re-Ed                                       Ortho Fit          Wrist/digit static progressive extension splint                             Modalities             5' 5' 5'

## 2025-03-10 ENCOUNTER — OFFICE VISIT (OUTPATIENT)
Dept: OCCUPATIONAL THERAPY | Age: 60
End: 2025-03-10
Payer: MEDICARE

## 2025-03-10 DIAGNOSIS — M65.949 TENOSYNOVITIS OF FINGER AND HAND: Primary | ICD-10-CM

## 2025-03-10 DIAGNOSIS — M25.641 STIFFNESS OF FINGER JOINT OF RIGHT HAND: ICD-10-CM

## 2025-03-10 DIAGNOSIS — L08.9 ANIMAL BITE OF LEFT HAND WITH INFECTION, SUBSEQUENT ENCOUNTER: ICD-10-CM

## 2025-03-10 DIAGNOSIS — S61.452D ANIMAL BITE OF LEFT HAND WITH INFECTION, SUBSEQUENT ENCOUNTER: ICD-10-CM

## 2025-03-10 DIAGNOSIS — M25.631 WRIST STIFFNESS, RIGHT: ICD-10-CM

## 2025-03-10 DIAGNOSIS — Z47.89 AFTERCARE FOLLOWING SURGERY OF THE MUSCULOSKELETAL SYSTEM: ICD-10-CM

## 2025-03-10 PROCEDURE — 97110 THERAPEUTIC EXERCISES: CPT

## 2025-03-10 PROCEDURE — 97140 MANUAL THERAPY 1/> REGIONS: CPT

## 2025-03-10 PROCEDURE — 97530 THERAPEUTIC ACTIVITIES: CPT

## 2025-03-10 NOTE — PROGRESS NOTES
"Daily Note     Today's date: 3/10/2025  Patient name: Yoni Castillo  : 1965  MRN: [id number]  Referring provider: Yesenia Carrasquillo PA-C  Dx:   Encounter Diagnosis     ICD-10-CM    1. Tenosynovitis of finger and hand  M65.949       2. Animal bite of left hand with infection, subsequent encounter  S61.452D     L08.9       3. Wrist stiffness, right  M25.631       4. Stiffness of finger joint of right hand  M25.641       5. Aftercare following surgery of the musculoskeletal system  Z47.89             Start Time: 1400  Stop Time: 1448  Total time in clinic (min): 48 minutes    Subjective: \"Blood, a bunch of clear fluid, and something that looked like a coffee grain came out when I picked at it\"      Objective: See treatment diary below      Assessment: Tolerated treatment well. Pt reports picking at distal portion of scar site on dorsum of hand and was able to take out what sounded like the stitch. Notes that there was minimal bleeding and a significant amount of what he described as clear fluid. There was also a spot that appeared pt removed scab from proximal area of incision. Pt was reluctant to use small dowel today due to feeling increased pain after using it for an activity last session. Patient would benefit from continued OT.      Plan: Continue per plan of care.  Progress treatment as tolerated.       Precautions: R wrist extensor tenolysis, wrist capsulotomy and de quervain's release on     Manuals   re-eval  re-eval 2/28 3/6 3/10     STM   10' 10' 10' 10'    Scar mob          edema    5' 5' 5' 5'     Wound care                     Ther Ex               wrist, digit PROM   2x20 2x20 2x20 2x20    Forearm, wrist, and digit AROM X20  2x30s each digit composite flexion and isolated MP flexion 2x30s each digit composite flexion and isolated MP flexion 2x30s each digit composite flexion and isolated MP flexion 2x30s each digit composite flexion and isolated MP flexion    Tendon glides  2x20 comp " fist 2x20 comp fist 2x20 comp fist 2x20 comp fist     Gentle strengthening   and twist with flex bar Yellow  and twist with flex bar Large and small sized dowel press into red theraputty. Large and small sized dowel press into red theraputty    Intrinsic stretches                                                                                     Ther Activity           activity modification          Fine motor/coordination  Nuts/bolts Large pegs with green pegboard  Large pegs with green pegboard                                                    Neuro Re-Ed                                       Ortho Fit          Wrist/digit static progressive extension splint                             Modalities             5' 5' 5' 5'

## 2025-03-12 ENCOUNTER — OFFICE VISIT (OUTPATIENT)
Dept: OBGYN CLINIC | Facility: MEDICAL CENTER | Age: 60
End: 2025-03-12

## 2025-03-12 ENCOUNTER — PREP FOR PROCEDURE (OUTPATIENT)
Dept: OBGYN CLINIC | Facility: MEDICAL CENTER | Age: 60
End: 2025-03-12

## 2025-03-12 VITALS — HEIGHT: 72 IN | BODY MASS INDEX: 38.87 KG/M2 | WEIGHT: 287 LBS

## 2025-03-12 DIAGNOSIS — N20.0 KIDNEY STONE: ICD-10-CM

## 2025-03-12 DIAGNOSIS — R39.89 SUSPECTED UTI: Primary | ICD-10-CM

## 2025-03-12 DIAGNOSIS — M25.641 FINGER STIFFNESS, RIGHT: Primary | ICD-10-CM

## 2025-03-12 DIAGNOSIS — M77.8 RIGHT WRIST TENDONITIS: ICD-10-CM

## 2025-03-12 PROCEDURE — 99024 POSTOP FOLLOW-UP VISIT: CPT | Performed by: ORTHOPAEDIC SURGERY

## 2025-03-12 RX ORDER — ACETAMINOPHEN 325 MG/1
975 TABLET ORAL ONCE
Status: CANCELLED | OUTPATIENT
Start: 2025-03-12 | End: 2025-03-12

## 2025-03-12 NOTE — H&P
"HAND & UPPER EXTREMITY OFFICE VISIT   Referred By:  No referring provider defined for this encounter.      Chief Complaint:     Right wrist pain    Surgery:  Surgery Date: 1/20/2025 - RELEASE DEQUERVAINS - Right - Right and Right wrist and hand extensor tenolysis and wrist capsulotomy - Right     History of Present Illness:   Patient presents now 7 weeks status post the above surgery. Since the last visit, he denies any significant progress with his ROM. He has tried using the progressive splints without significant improvement. He reports some pain at the volar radial wrist with lifting, and at the volar ulnar wrist with tight gripping. He does report that the most distal aspect of his dorsal incision had some mild clear drainage and small piece of black material which \"looked like a coffee ground\" was expressed. The area has since resolved and is now covered with a scab.     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    COLONOSCOPY      CONTRACTURE Right 1/20/2025    Procedure: Right wrist and hand extensor tenolysis and wrist capsulotomy;  Surgeon: Carroll Mccarthy MD;  Location: [facility];  Service: Orthopedics    FOOT SURGERY  2022    Left Foot    GANGLION CYST EXCISION Left 03/25/2024    Procedure: EXCISION MUCOID CYST, INDEX FINGER DIP;  Surgeon: Carroll Mccarthy MD;  [facility];  Service: Orthopedics    GANGLION CYST EXCISION Right 05/20/2024    Procedure: EXCISION MUCOID CYST - RIGHT INDEX AND MIDDLE FINGERS;  Surgeon: Carroll Mccarthy MD;  [facility];  Service: Orthopedics    HERNIA REPAIR      INCISION AND DRAINAGE  01/16/2024    KIDNEY " SURGERY      KNEE SURGERY  1980    MOUTH SURGERY      GA AMPUTATION METATARSAL W/TOE SINGLE Left 6/1/2022    Procedure: RAY RESECTION FOOT;  Surgeon: Hannah Melgoza DPM;  Location[facility];  Service: Podiatry    GA INCISION & DRAINAGE ABSCESS COMPLICATED/MULTIPLE Right 09/17/2024    Procedure: Irrigation and debridement right wrist and hand, any indicated procedures;  Surgeon: Carroll Mccarthy MD;  [facility];  Service: Orthopedics    GA INCISION EXTENSOR TENDON SHEATH WRIST Right 1/20/2025    Procedure: RELEASE DEQUERVAINS - Right;  Surgeon: Carroll Mccarthy MD;  [facility];  Service: Orthopedics    GA RPR AA HERNIA 1ST < 3 CM REDUCIBLE N/A 7/14/2023    Procedure: REPAIR HERNIA INCISIONAL;  Surgeon: Matt Melo MD;  [facility];  Service: General    GA SYNOVECTOMY EXTENSOR TENDON SHTH WRIST 1 CMPRT Right 10/03/2024    Procedure: Irrigation and debridement, right wrist, volar and dorsal, possible extensor and flexor tenosynovectomy, any indicated procedures;  Surgeon: Carroll Mccarthy MD;  [facility];  Service: Orthopedics    TONSILLECTOMY  1968    Yes    WOUND DEBRIDEMENT Left 4/28/2022    Procedure: Left foot washout;  Surgeon: Hannah Melgoza DPM;  [facility];  Service: Podiatry    WOUND DEBRIDEMENT Left 6/6/2022    Procedure: DEBRIDEMENT WOUND (WASH OUT);  Surgeon: Hannah Melgoza DPM;  [facility];  Service: Podiatry     Family History   Problem Relation Age of Onset    Diabetes Paternal Grandmother     Diabetes Paternal Grandfather     Arthritis Maternal Grandmother      Social History     Socioeconomic History    Marital status: Registered Domestic Partner     Spouse name: Not on file    Number of children: Not on file    Years of education: Not on file    Highest education level: Not on file   Occupational History    Not on file   Tobacco Use    Smoking status: Never    Smokeless tobacco: Never   Vaping Use    Vaping  status: Never Used   Substance and Sexual Activity    Alcohol use: Yes     Alcohol/week: 1.0 standard drink of alcohol     Types: 1 Cans of beer per week     Comment: ocassional    Drug use: Never    Sexual activity: Not Currently     Partners: Female     Birth control/protection: Abstinence   Other Topics Concern    Not on file   Social History Narrative    Not on file     Social Drivers of Health     Financial Resource Strain: Not on file   Food Insecurity: Patient Declined (11/13/2024)    Hunger Vital Sign     Worried About Running Out of Food in the Last Year: Patient declined     Ran Out of Food in the Last Year: Patient declined   Transportation Needs: Patient Declined (11/13/2024)    PRAPARE - Transportation     Lack of Transportation (Medical): Patient declined     Lack of Transportation (Non-Medical): Patient declined   Physical Activity: Not on file   Stress: Not on file   Social Connections: Not on file   Intimate Partner Violence: Not on file   Housing Stability: Patient Declined (11/13/2024)    Housing Stability Vital Sign     Unable to Pay for Housing in the Last Year: Patient declined     Number of Times Moved in the Last Year: 0     Homeless in the Last Year: Patient declined     Scheduled Meds:  Continuous Infusions:No current facility-administered medications for this visit.    PRN Meds:.  Allergies   Allergen Reactions    Cephalexin Hives     Skin peeling  Tolerates penicillins (tolerated unasyn and zosyn)    Metformin GI Intolerance    Pollen Extract Sneezing       Physical Examination:    Ht 6' (1.829 m)   Wt 130 kg (287 lb)   BMI 38.92 kg/m²     Gen: A&Ox3, NAD    Right Upper Extremity:  Small scab present at center and distal aspect of dorsal wrist incision. Remainder of incision fully closed. No expressible fluid. No surrounding erythema or signs of infection. De Quervain's incision well-healed.   tender over FCR and FCU tendons, pain with resisted wrist flexion  80% composite fist  Able to  oppose thumb to ring finger tip, unable to oppose thumb to small finger tip but can't on the other side either.   Extrinsic tightness of all digits on Bunnel test  Sensation intact to light touch in the axillary median, ulnar, and radial nerve distributions  Warm, well-perfused digits  Cap refill <2s            Studies:  No new imaging available.     Assessment & Plan  Finger stiffness, right  Overall his finger range of motion has improved significantly and his finger pain is improved significantly but he continues to struggle with some stiffness in the fingers (mostly extrinsic tightness ) without significant improvement with PT since last evaluation. We discussed that most of his tightness seems to be attributable to scarring dorsally around the extensor tendons. We reviewed treatment options including continued PT vs repeat extensor tenolysis.  We did discuss the chance that revision surgery may not provide much more improvement in his ROM compared to the previous procedure.  He is discouraged by his recent progress with PT but overall I think that continuing with therapy is his best long-term option to improve his extrinsic tightness at this time.  He is in agreement with this will continue to work with therapy and his home exercise program.    Orders:    Case request operating room: SYNOVECTOMY / DEBRIDEMENT - FLEXOR CARPI RADIALIS AND FLEXOR CARPI ULNARIS TENDONS- RIGHT, POSSIBLE FLEXOR TENOLYSIS; Standing    Right wrist tendonitis  Majority of his pain in the wrist seems to be more attributable to FCR and FCU tendonitis.  Options include continued therapy, corticosteroid injections/other oral medications, activity modifications, or we discussed that surgery would be an option to debride the tendons and help with his pain in the wrist.  He has not made much progress with therapy over the last several weeks and even before his most recent surgery as he has been having this pain for several months since his  initial I&D.  We agreed after shared decision making process that corticosteroid injections are a poor option for him given his diabetes.  He would like to proceed with surgery.  We reviewed the risks of surgery including infection, bleeding, injury to nearby structures including the nerve, poor wound healing, stiffness, CRPS, and incomplete resolution or recurrence of symptoms. We reviewed the details of the procedure and the anticipated recovery period. All questions answered to patient's satisfaction. Written consent obtained in the office today.     Right FCR and FCU tenosynovectomy and debridement.  Conscious sedation    Orders:    Case request operating room: SYNOVECTOMY / DEBRIDEMENT - FLEXOR CARPI RADIALIS AND FLEXOR CARPI ULNARIS TENDONS- RIGHT, POSSIBLE FLEXOR TENOLYSIS; Standing        he expressed understanding of the plan and agreed. We encouraged them to contact our office with any questions or concerns.         Carroll Mccarthy MD  Hand and Upper Extremity Surgery          *This note was dictated using Dragon voice recognition software. Please excuse any word substitutions or errors.*      Scribe Attestation      I,:  Yesenia Carrasquillo PA-C am acting as a scribe while in the presence of the attending physician.:       I,:  Carroll Mccarthy MD personally performed the services described in this documentation    as scribed in my presence.:

## 2025-03-12 NOTE — PROGRESS NOTES
"HAND & UPPER EXTREMITY OFFICE VISIT   Referred By:  No referring provider defined for this encounter.      Chief Complaint:     Right wrist pain    Surgery:  Surgery Date: 1/20/2025 - RELEASE DEQUERVAINS - Right - Right and Right wrist and hand extensor tenolysis and wrist capsulotomy - Right     History of Present Illness:   Patient presents now 7 weeks status post the above surgery. Since the last visit, he denies any significant progress with his ROM. He has tried using the progressive splints without significant improvement. He reports some pain at the volar radial wrist with lifting, and at the volar ulnar wrist with tight gripping. He does report that the most distal aspect of his dorsal incision had some mild clear drainage and small piece of black material which \"looked like a coffee ground\" was expressed. The area has since resolved and is now covered with a scab.     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    COLONOSCOPY      CONTRACTURE Right 1/20/2025    Procedure: Right wrist and hand extensor tenolysis and wrist capsulotomy;  Surgeon: Carroll Mccarthy MD;  [facility];  Service: Orthopedics    FOOT SURGERY  2022    Left Foot    GANGLION CYST EXCISION Left 03/25/2024    Procedure: EXCISION MUCOID CYST, INDEX FINGER DIP;  Surgeon: Carroll Mccarthy MD;  [facility];  Service: Orthopedics    GANGLION CYST EXCISION Right 05/20/2024    Procedure: EXCISION MUCOID CYST - RIGHT INDEX AND MIDDLE FINGERS;  Surgeon: Carroll Mccarthy MD;  [facility];  Service: Orthopedics    HERNIA REPAIR      INCISION AND DRAINAGE  01/16/2024    KIDNEY " SURGERY      KNEE SURGERY  1980    MOUTH SURGERY      WI AMPUTATION METATARSAL W/TOE SINGLE Left 6/1/2022    Procedure: RAY RESECTION FOOT;  Surgeon: Hannah Melgoza DPM;  [facility];  Service: Podiatry    WI INCISION & DRAINAGE ABSCESS COMPLICATED/MULTIPLE Right 09/17/2024    Procedure: Irrigation and debridement right wrist and hand, any indicated procedures;  Surgeon: Carroll Mccarthy MD;  [facility];  Service: Orthopedics    WI INCISION EXTENSOR TENDON SHEATH WRIST Right 1/20/2025    Procedure: RELEASE DEQUERVAINS - Right;  Surgeon: Carroll Mccarthy MD;  [facility];  Service: Orthopedics    WI RPR AA HERNIA 1ST < 3 CM REDUCIBLE N/A 7/14/2023    Procedure: REPAIR HERNIA INCISIONAL;  Surgeon: Matt Melo MD;  [facility];  Service: General    WI SYNOVECTOMY EXTENSOR TENDON SHTH WRIST 1 CMPRT Right 10/03/2024    Procedure: Irrigation and debridement, right wrist, volar and dorsal, possible extensor and flexor tenosynovectomy, any indicated procedures;  Surgeon: Carroll Mccarthy MD;  [facility];  Service: Orthopedics    TONSILLECTOMY  1968    Yes    WOUND DEBRIDEMENT Left 4/28/2022    Procedure: Left foot washout;  Surgeon: Hannah Melgoza DPM;  [facility];  Service: Podiatry    WOUND DEBRIDEMENT Left 6/6/2022    Procedure: DEBRIDEMENT WOUND (WASH OUT);  Surgeon: Hannah Melgoza DPM;  [facility];  Service: Podiatry     Family History   Problem Relation Age of Onset    Diabetes Paternal Grandmother     Diabetes Paternal Grandfather     Arthritis Maternal Grandmother      Social History     Socioeconomic History    Marital status: Registered Domestic Partner     Spouse name: Not on file    Number of children: Not on file    Years of education: Not on file    Highest education level: Not on file   Occupational History    Not on file   Tobacco Use    Smoking status: Never    Smokeless tobacco: Never   Vaping Use    Vaping  status: Never Used   Substance and Sexual Activity    Alcohol use: Yes     Alcohol/week: 1.0 standard drink of alcohol     Types: 1 Cans of beer per week     Comment: ocassional    Drug use: Never    Sexual activity: Not Currently     Partners: Female     Birth control/protection: Abstinence   Other Topics Concern    Not on file   Social History Narrative    Not on file     Social Drivers of Health     Financial Resource Strain: Not on file   Food Insecurity: Patient Declined (11/13/2024)    Hunger Vital Sign     Worried About Running Out of Food in the Last Year: Patient declined     Ran Out of Food in the Last Year: Patient declined   Transportation Needs: Patient Declined (11/13/2024)    PRAPARE - Transportation     Lack of Transportation (Medical): Patient declined     Lack of Transportation (Non-Medical): Patient declined   Physical Activity: Not on file   Stress: Not on file   Social Connections: Not on file   Intimate Partner Violence: Not on file   Housing Stability: Patient Declined (11/13/2024)    Housing Stability Vital Sign     Unable to Pay for Housing in the Last Year: Patient declined     Number of Times Moved in the Last Year: 0     Homeless in the Last Year: Patient declined     Scheduled Meds:  Continuous Infusions:No current facility-administered medications for this visit.    PRN Meds:.  Allergies   Allergen Reactions    Cephalexin Hives     Skin peeling  Tolerates penicillins (tolerated unasyn and zosyn)    Metformin GI Intolerance    Pollen Extract Sneezing       Physical Examination:    Ht 6' (1.829 m)   Wt 130 kg (287 lb)   BMI 38.92 kg/m²     Gen: A&Ox3, NAD    Right Upper Extremity:  Small scab present at center and distal aspect of dorsal wrist incision. Remainder of incision fully closed. No expressible fluid. No surrounding erythema or signs of infection. De Quervain's incision well-healed.   tender over FCR and FCU tendons, pain with resisted wrist flexion  80% composite fist  Able to  oppose thumb to ring finger tip, unable to oppose thumb to small finger tip but can't on the other side either.   Extrinsic tightness of all digits on Bunnel test  Sensation intact to light touch in the axillary median, ulnar, and radial nerve distributions  Warm, well-perfused digits  Cap refill <2s            Studies:  No new imaging available.     Assessment & Plan  Finger stiffness, right  Overall his finger range of motion has improved significantly and his finger pain is improved significantly but he continues to struggle with some stiffness in the fingers (mostly extrinsic tightness ) without significant improvement with PT since last evaluation. We discussed that most of his tightness seems to be attributable to scarring dorsally around the extensor tendons. We reviewed treatment options including continued PT vs repeat extensor tenolysis.  We did discuss the chance that revision surgery may not provide much more improvement in his ROM compared to the previous procedure.  He is discouraged by his recent progress with PT but overall I think that continuing with therapy is his best long-term option to improve his extrinsic tightness at this time.  He is in agreement with this will continue to work with therapy and his home exercise program.    Orders:    Case request operating room: SYNOVECTOMY / DEBRIDEMENT - FLEXOR CARPI RADIALIS AND FLEXOR CARPI ULNARIS TENDONS- RIGHT, POSSIBLE FLEXOR TENOLYSIS; Standing    Right wrist tendonitis  Majority of his pain in the wrist seems to be more attributable to FCR and FCU tendonitis.  Options include continued therapy, corticosteroid injections/other oral medications, activity modifications, or we discussed that surgery would be an option to debride the tendons and help with his pain in the wrist.  He has not made much progress with therapy over the last several weeks and even before his most recent surgery as he has been having this pain for several months since his  initial I&D.  We agreed after shared decision making process that corticosteroid injections are a poor option for him given his diabetes.  He would like to proceed with surgery.  We reviewed the risks of surgery including infection, bleeding, injury to nearby structures including the nerve, poor wound healing, stiffness, CRPS, and incomplete resolution or recurrence of symptoms. We reviewed the details of the procedure and the anticipated recovery period. All questions answered to patient's satisfaction. Written consent obtained in the office today.     Right FCR and FCU tenosynovectomy and debridement.  Conscious sedation    Orders:    Case request operating room: SYNOVECTOMY / DEBRIDEMENT - FLEXOR CARPI RADIALIS AND FLEXOR CARPI ULNARIS TENDONS- RIGHT, POSSIBLE FLEXOR TENOLYSIS; Standing        he expressed understanding of the plan and agreed. We encouraged them to contact our office with any questions or concerns.         Carroll Mccarthy MD  Hand and Upper Extremity Surgery          *This note was dictated using Dragon voice recognition software. Please excuse any word substitutions or errors.*      Scribe Attestation      I,:  Yesenia Carrasquillo PA-C am acting as a scribe while in the presence of the attending physician.:       I,:  Carroll Mccarthy MD personally performed the services described in this documentation    as scribed in my presence.:

## 2025-03-12 NOTE — H&P (VIEW-ONLY)
"HAND & UPPER EXTREMITY OFFICE VISIT   Referred By:  No referring provider defined for this encounter.      Chief Complaint:     Right wrist pain    Surgery:  Surgery Date: 1/20/2025 - RELEASE DEQUERVAINS - Right - Right and Right wrist and hand extensor tenolysis and wrist capsulotomy - Right     History of Present Illness:   Patient presents now 7 weeks status post the above surgery. Since the last visit, he denies any significant progress with his ROM. He has tried using the progressive splints without significant improvement. He reports some pain at the volar radial wrist with lifting, and at the volar ulnar wrist with tight gripping. He does report that the most distal aspect of his dorsal incision had some mild clear drainage and small piece of black material which \"looked like a coffee ground\" was expressed. The area has since resolved and is now covered with a scab.     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    COLONOSCOPY      CONTRACTURE Right 1/20/2025    Procedure: Right wrist and hand extensor tenolysis and wrist capsulotomy;  Surgeon: Carroll Mccarthy MD;  [facility];  Service: Orthopedics    FOOT SURGERY  2022    Left Foot    GANGLION CYST EXCISION Left 03/25/2024    Procedure: EXCISION MUCOID CYST, INDEX FINGER DIP;  Surgeon: Carroll Mccarthy MD;  [facility];  Service: Orthopedics    GANGLION CYST EXCISION Right 05/20/2024    Procedure: EXCISION MUCOID CYST - RIGHT INDEX AND MIDDLE FINGERS;  Surgeon: Carroll Mccarthy MD;  [facility];  Service: Orthopedics    HERNIA REPAIR      INCISION AND DRAINAGE  01/16/2024    KIDNEY " SURGERY      KNEE SURGERY  1980    MOUTH SURGERY      RI AMPUTATION METATARSAL W/TOE SINGLE Left 6/1/2022    Procedure: RAY RESECTION FOOT;  Surgeon: Hannah Melgoza DPM;  [facility];  Service: Podiatry    RI INCISION & DRAINAGE ABSCESS COMPLICATED/MULTIPLE Right 09/17/2024    Procedure: Irrigation and debridement right wrist and hand, any indicated procedures;  Surgeon: Carroll Mccarthy MD;  [facility];  Service: Orthopedics    RI INCISION EXTENSOR TENDON SHEATH WRIST Right 1/20/2025    Procedure: RELEASE DEQUERVAINS - Right;  Surgeon: Carroll Mccarthy MD;  [facility];  Service: Orthopedics    RI RPR AA HERNIA 1ST < 3 CM REDUCIBLE N/A 7/14/2023    Procedure: REPAIR HERNIA INCISIONAL;  Surgeon: Matt Melo MD;  [facility];  Service: General    RI SYNOVECTOMY EXTENSOR TENDON SHTH WRIST 1 CMPRT Right 10/03/2024    Procedure: Irrigation and debridement, right wrist, volar and dorsal, possible extensor and flexor tenosynovectomy, any indicated procedures;  Surgeon: Carroll Mccarthy MD;  [facility];  Service: Orthopedics    TONSILLECTOMY  1968    Yes    WOUND DEBRIDEMENT Left 4/28/2022    Procedure: Left foot washout;  Surgeon: Hannah Melgoza DPM;  [facility];  Service: Podiatry    WOUND DEBRIDEMENT Left 6/6/2022    Procedure: DEBRIDEMENT WOUND (WASH OUT);  Surgeon: Hannah Melgoza DPM;  [facility];  Service: Podiatry     Family History   Problem Relation Age of Onset    Diabetes Paternal Grandmother     Diabetes Paternal Grandfather     Arthritis Maternal Grandmother      Social History     Socioeconomic History    Marital status: Registered Domestic Partner     Spouse name: Not on file    Number of children: Not on file    Years of education: Not on file    Highest education level: Not on file   Occupational History    Not on file   Tobacco Use    Smoking status: Never    Smokeless tobacco: Never   Vaping Use    Vaping  status: Never Used   Substance and Sexual Activity    Alcohol use: Yes     Alcohol/week: 1.0 standard drink of alcohol     Types: 1 Cans of beer per week     Comment: ocassional    Drug use: Never    Sexual activity: Not Currently     Partners: Female     Birth control/protection: Abstinence   Other Topics Concern    Not on file   Social History Narrative    Not on file     Social Drivers of Health     Financial Resource Strain: Not on file   Food Insecurity: Patient Declined (11/13/2024)    Hunger Vital Sign     Worried About Running Out of Food in the Last Year: Patient declined     Ran Out of Food in the Last Year: Patient declined   Transportation Needs: Patient Declined (11/13/2024)    PRAPARE - Transportation     Lack of Transportation (Medical): Patient declined     Lack of Transportation (Non-Medical): Patient declined   Physical Activity: Not on file   Stress: Not on file   Social Connections: Not on file   Intimate Partner Violence: Not on file   Housing Stability: Patient Declined (11/13/2024)    Housing Stability Vital Sign     Unable to Pay for Housing in the Last Year: Patient declined     Number of Times Moved in the Last Year: 0     Homeless in the Last Year: Patient declined     Scheduled Meds:  Continuous Infusions:No current facility-administered medications for this visit.    PRN Meds:.  Allergies   Allergen Reactions    Cephalexin Hives     Skin peeling  Tolerates penicillins (tolerated unasyn and zosyn)    Metformin GI Intolerance    Pollen Extract Sneezing       Physical Examination:    Ht 6' (1.829 m)   Wt 130 kg (287 lb)   BMI 38.92 kg/m²     Gen: A&Ox3, NAD    Right Upper Extremity:  Small scab present at center and distal aspect of dorsal wrist incision. Remainder of incision fully closed. No expressible fluid. No surrounding erythema or signs of infection. De Quervain's incision well-healed.   tender over FCR and FCU tendons, pain with resisted wrist flexion  80% composite fist  Able to  oppose thumb to ring finger tip, unable to oppose thumb to small finger tip but can't on the other side either.   Extrinsic tightness of all digits on Bunnel test  Sensation intact to light touch in the axillary median, ulnar, and radial nerve distributions  Warm, well-perfused digits  Cap refill <2s            Studies:  No new imaging available.     Assessment & Plan  Finger stiffness, right  Overall his finger range of motion has improved significantly and his finger pain is improved significantly but he continues to struggle with some stiffness in the fingers (mostly extrinsic tightness ) without significant improvement with PT since last evaluation. We discussed that most of his tightness seems to be attributable to scarring dorsally around the extensor tendons. We reviewed treatment options including continued PT vs repeat extensor tenolysis.  We did discuss the chance that revision surgery may not provide much more improvement in his ROM compared to the previous procedure.  He is discouraged by his recent progress with PT but overall I think that continuing with therapy is his best long-term option to improve his extrinsic tightness at this time.  He is in agreement with this will continue to work with therapy and his home exercise program.    Orders:    Case request operating room: SYNOVECTOMY / DEBRIDEMENT - FLEXOR CARPI RADIALIS AND FLEXOR CARPI ULNARIS TENDONS- RIGHT, POSSIBLE FLEXOR TENOLYSIS; Standing    Right wrist tendonitis  Majority of his pain in the wrist seems to be more attributable to FCR and FCU tendonitis.  Options include continued therapy, corticosteroid injections/other oral medications, activity modifications, or we discussed that surgery would be an option to debride the tendons and help with his pain in the wrist.  He has not made much progress with therapy over the last several weeks and even before his most recent surgery as he has been having this pain for several months since his  initial I&D.  We agreed after shared decision making process that corticosteroid injections are a poor option for him given his diabetes.  He would like to proceed with surgery.  We reviewed the risks of surgery including infection, bleeding, injury to nearby structures including the nerve, poor wound healing, stiffness, CRPS, and incomplete resolution or recurrence of symptoms. We reviewed the details of the procedure and the anticipated recovery period. All questions answered to patient's satisfaction. Written consent obtained in the office today.     Right FCR and FCU tenosynovectomy and debridement.  Conscious sedation    Orders:    Case request operating room: SYNOVECTOMY / DEBRIDEMENT - FLEXOR CARPI RADIALIS AND FLEXOR CARPI ULNARIS TENDONS- RIGHT, POSSIBLE FLEXOR TENOLYSIS; Standing        he expressed understanding of the plan and agreed. We encouraged them to contact our office with any questions or concerns.         Carroll Mccarthy MD  Hand and Upper Extremity Surgery          *This note was dictated using Dragon voice recognition software. Please excuse any word substitutions or errors.*      Scribe Attestation      I,:  Yesenia Carrasquillo PA-C am acting as a scribe while in the presence of the attending physician.:       I,:  Carroll Mccarthy MD personally performed the services described in this documentation    as scribed in my presence.:

## 2025-03-12 NOTE — ASSESSMENT & PLAN NOTE
Overall his finger range of motion has improved significantly and his finger pain is improved significantly but he continues to struggle with some stiffness in the fingers (mostly extrinsic tightness ) without significant improvement with PT since last evaluation. We discussed that most of his tightness seems to be attributable to scarring dorsally around the extensor tendons. We reviewed treatment options including continued PT vs repeat extensor tenolysis.  We did discuss the chance that revision surgery may not provide much more improvement in his ROM compared to the previous procedure.  He is discouraged by his recent progress with PT but overall I think that continuing with therapy is his best long-term option to improve his extrinsic tightness at this time.  He is in agreement with this will continue to work with therapy and his home exercise program.    Orders:    Case request operating room: SYNOVECTOMY / DEBRIDEMENT - FLEXOR CARPI RADIALIS AND FLEXOR CARPI ULNARIS TENDONS- RIGHT, POSSIBLE FLEXOR TENOLYSIS; Standing

## 2025-03-13 ENCOUNTER — OFFICE VISIT (OUTPATIENT)
Dept: OCCUPATIONAL THERAPY | Age: 60
End: 2025-03-13
Payer: MEDICARE

## 2025-03-13 ENCOUNTER — ANESTHESIA EVENT (OUTPATIENT)
Age: 60
End: 2025-03-13
Payer: MEDICARE

## 2025-03-13 DIAGNOSIS — Z47.89 AFTERCARE FOLLOWING SURGERY OF THE MUSCULOSKELETAL SYSTEM: ICD-10-CM

## 2025-03-13 DIAGNOSIS — L08.9 ANIMAL BITE OF LEFT HAND WITH INFECTION, SUBSEQUENT ENCOUNTER: ICD-10-CM

## 2025-03-13 DIAGNOSIS — M65.949 TENOSYNOVITIS OF FINGER AND HAND: Primary | ICD-10-CM

## 2025-03-13 DIAGNOSIS — S61.452D ANIMAL BITE OF LEFT HAND WITH INFECTION, SUBSEQUENT ENCOUNTER: ICD-10-CM

## 2025-03-13 DIAGNOSIS — M25.641 STIFFNESS OF FINGER JOINT OF RIGHT HAND: ICD-10-CM

## 2025-03-13 DIAGNOSIS — M25.631 WRIST STIFFNESS, RIGHT: ICD-10-CM

## 2025-03-13 PROCEDURE — 97110 THERAPEUTIC EXERCISES: CPT

## 2025-03-13 PROCEDURE — 97530 THERAPEUTIC ACTIVITIES: CPT

## 2025-03-13 PROCEDURE — 97140 MANUAL THERAPY 1/> REGIONS: CPT

## 2025-03-13 NOTE — PROGRESS NOTES
Daily Note     Today's date: 3/13/2025  Patient name: Yoni Castillo  : 1965  MRN: [id number]  Referring provider: Yesenia Carrasquillo PA-C  Dx:   Encounter Diagnosis     ICD-10-CM    1. Tenosynovitis of finger and hand  M65.949       2. Animal bite of left hand with infection, subsequent encounter  S61.452D     L08.9       3. Wrist stiffness, right  M25.631       4. Stiffness of finger joint of right hand  M25.641       5. Aftercare following surgery of the musculoskeletal system  Z47.89             Start Time: 1123  Stop Time: 1218  Total time in clinic (min): 55 minutes    Subjective: Pt followed up with Dr. Mccarthy. The plan is for a FCR, FCU debridement and possible flexor tenolysis on 3/24.       Objective: See treatment diary below      Assessment: Tolerated treatment well. Pt continues with stiffness. Able to bring digits into more passive flexion today vs last visit. However, pt's active motion is roughly the same. Reviewed prolong stretching for extrinsic tightness. Patient would benefit from continued OT.      Plan: Continue per plan of care.  Progress treatment as tolerated.       Precautions: R wrist extensor tenolysis, wrist capsulotomy and de quervain's release on     Manuals   re-eval  re-eval 2/28 3/6 3/10 3/13    STM   10' 10' 10' 10' 10'   Scar mob          edema    5' 5' 5' 5' 5'    Wound care                     Ther Ex               wrist, digit PROM   2x20 2x20 2x20 2x20 2x20   Forearm, wrist, and digit AROM X20  2x30s each digit composite flexion and isolated MP flexion 2x30s each digit composite flexion and isolated MP flexion 2x30s each digit composite flexion and isolated MP flexion 2x30s each digit composite flexion and isolated MP flexion 2x30s each digit composite flexion and isolated MP flexion   Tendon glides  2x20 comp fist 2x20 comp fist 2x20 comp fist 2x20 comp fist  2x20 comp fist   Gentle strengthening   and twist with flex bar Yellow  and twist with flex  bar Large and small sized dowel press into red theraputty. Large and small sized dowel press into red theraputty    Intrinsic stretches                                                                                     Ther Activity           activity modification          Fine motor/coordination  Nuts/bolts Large pegs with green pegboard  Large pegs with green pegboard  Boading balls in-hand translation    Removing items from tan putty    Tan putty press with 2lb jessenia                                                  Neuro Re-Ed                                       Ortho Fit          Wrist/digit static progressive extension splint                             Modalities          HP   5' 5' 5' 5' 5'

## 2025-03-13 NOTE — PRE-PROCEDURE INSTRUCTIONS
Pre-Surgery Instructions:   Medication Instructions    acetaminophen (TYLENOL) 500 mg tablet Uses PRN- OK to take day of surgery    Alpha-Lipoic Acid 600 MG CAPS Stop taking 7 days prior to surgery.    amitriptyline (ELAVIL) 10 mg tablet Take night before surgery    ammonium lactate (LAC-HYDRIN) 12 % lotion Hold day of surgery.    Apple Cider Vinegar 300 MG TABS Stop taking 7 days prior to surgery.    Ascorbic Acid (vitamin C) 1000 MG tablet Stop taking 7 days prior to surgery.    BENFOTIAMINE PO Stop taking 7 days prior to surgery.    beta carotene 30 MG capsule Stop taking 7 days prior to surgery.    carvedilol (COREG) 25 mg tablet Take day of surgery.    Chromium Picolinate 200 MCG TABS Stop taking 7 days prior to surgery.    Empagliflozin (Jardiance) 25 MG TABS Last dose 3-20-25 per protocol    Garlic 1200 MG CAPS Stop taking 7 days prior to surgery.    IRON, FERROUS SULFATE, PO Hold day of surgery.    ketoconazole (NIZORAL) 2 % cream Hold day of surgery.    lidocaine (Lidoderm) 5 % Hold day of surgery.    lisinopril-hydrochlorothiazide (PRINZIDE,ZESTORETIC) 20-12.5 MG per tablet Hold day of surgery.    Lutein-Bilberry (LUTEIN PLUS BILBERRY PO) Stop taking 7 days prior to surgery.    methocarbamol (ROBAXIN) 750 mg tablet Uses PRN- OK to take day of surgery    NON FORMULARY Stop taking 7 days prior to surgery.    Pyridoxine HCl (B-6 PO) Stop taking 7 days prior to surgery.    TART CHERRY PO Stop taking 7 days prior to surgery.    traMADol (Ultram) 50 mg tablet Uses PRN- OK to take day of surgery    Turmeric (QC TUMERIC COMPLEX PO) Stop taking 7 days prior to surgery.    Ubrogepant (UBRELVY) 100 MG tablet Uses PRN- OK to take day of surgery    VITAMIN D PO Stop taking 7 days prior to surgery.    vitamin E, tocopherol, 400 units capsule Stop taking 7 days prior to surgery.    Zinc 50 MG CAPS Stop taking 7 days prior to surgery.   Medication instructions for day of surgery reviewed. Please take all instructed  medications with only a sip of water.       You will receive a call one business day prior to surgery with an arrival time and hospital directions. If your surgery is scheduled on a Monday, the hospital will be calling you on the Friday prior to your surgery. If you have not heard from anyone by 8pm, please call the hospital supervisor through the hospital  at [phone]  or [place] [phone]).    Do not eat or drink anything after midnight the night before your surgery, including candy, mints, lifesavers, or chewing gum. Do not drink alcohol 24hrs before your surgery. Try not to smoke at least 24hrs before your surgery.       Follow the pre surgery showering instructions as listed in the “My Surgical Experience Booklet” or otherwise provided by your surgeon's office. Do not use a blade to shave the surgical area 1 week before surgery. It is okay to use a clean electric clippers up to 24 hours before surgery. Do not apply any lotions, creams, including makeup, cologne, deodorant, or perfumes after showering on the day of your surgery. Do not use dry shampoo, hair spray, hair gel, or any type of hair products.     No contact lenses, eye make-up, or artificial eyelashes. Remove nail polish, including gel polish, and any artificial, gel, or acrylic nails if possible. Remove all jewelry including rings and body piercing jewelry.     Wear causal clothing that is easy to take on and off. Consider your type of surgery.    Keep any valuables, jewelry, piercings at home. Please bring any specially ordered equipment (sling, braces) if indicated.    Arrange for a responsible person to drive you to and from the hospital on the day of your surgery. Please confirm the visitor policy for the day of your procedure when you receive your phone call with an arrival time.     Call the surgeon's office with any new illnesses, exposures, or additional questions prior to surgery.    Please reference your “My Surgical  Experience Booklet” for additional information to prepare for your upcoming surgery.

## 2025-03-17 ENCOUNTER — OFFICE VISIT (OUTPATIENT)
Dept: OCCUPATIONAL THERAPY | Age: 60
End: 2025-03-17
Payer: MEDICARE

## 2025-03-17 DIAGNOSIS — M25.641 STIFFNESS OF FINGER JOINT OF RIGHT HAND: ICD-10-CM

## 2025-03-17 DIAGNOSIS — M25.631 WRIST STIFFNESS, RIGHT: ICD-10-CM

## 2025-03-17 DIAGNOSIS — S61.452D ANIMAL BITE OF LEFT HAND WITH INFECTION, SUBSEQUENT ENCOUNTER: ICD-10-CM

## 2025-03-17 DIAGNOSIS — L08.9 ANIMAL BITE OF LEFT HAND WITH INFECTION, SUBSEQUENT ENCOUNTER: ICD-10-CM

## 2025-03-17 DIAGNOSIS — Z47.89 AFTERCARE FOLLOWING SURGERY OF THE MUSCULOSKELETAL SYSTEM: ICD-10-CM

## 2025-03-17 DIAGNOSIS — M65.949 TENOSYNOVITIS OF FINGER AND HAND: Primary | ICD-10-CM

## 2025-03-17 PROCEDURE — 97140 MANUAL THERAPY 1/> REGIONS: CPT

## 2025-03-17 PROCEDURE — 97110 THERAPEUTIC EXERCISES: CPT
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
## 2025-03-18 ENCOUNTER — OFFICE VISIT (OUTPATIENT)
Dept: ENDOCRINOLOGY | Facility: CLINIC | Age: 60
End: 2025-03-18
Payer: MEDICARE

## 2025-03-18 VITALS — BODY MASS INDEX: 38.92 KG/M2 | SYSTOLIC BLOOD PRESSURE: 130 MMHG | HEIGHT: 72 IN | DIASTOLIC BLOOD PRESSURE: 82 MMHG

## 2025-03-18 DIAGNOSIS — I10 ESSENTIAL HYPERTENSION: ICD-10-CM

## 2025-03-18 DIAGNOSIS — E11.22 TYPE 2 DIABETES MELLITUS WITH CHRONIC KIDNEY DISEASE, WITHOUT LONG-TERM CURRENT USE OF INSULIN, UNSPECIFIED CKD STAGE (HCC): Primary | ICD-10-CM

## 2025-03-18 PROCEDURE — G2211 COMPLEX E/M VISIT ADD ON: HCPCS | Performed by: PHYSICIAN ASSISTANT

## 2025-03-18 PROCEDURE — 99214 OFFICE O/P EST MOD 30 MIN: CPT | Performed by: PHYSICIAN ASSISTANT

## 2025-03-18 NOTE — PROGRESS NOTES
Name: Yoni Castillo      : 1965      MRN: [id number]  Encounter Provider: Shayna Kendall PA-C  Encounter Date: 3/18/2025   Encounter department: [facility] [place]    Chief Complaint   Patient presents with   • Diabetes Type 2   :  Assessment & Plan  Type 2 diabetes mellitus with chronic kidney disease, without long-term current use of insulin, unspecified CKD stage (HCC)    Lab Results   Component Value Date    HGBA1C 8.8 (H) 2025   Current HbA1c represents poor control. Blood sugars unavailable for review today. HbA1c likely due to discontinuation of Trulicity and dietary indiscretion.   Continue current regimen with the following adjustments:  Patient to improve diet and activity  Patient will contact the office if he is unable to achieve goal blood glucose and will consider resuming Trulicity  Advised to adhere to diabetic diet, and recommended staying active/exercising routinely.  Discussed symptoms and treatment of hypoglycemia.    Recommended routine follow-up with Ophthalmology and foot exams.   Ordered blood work to complete prior to next visit.    Orders:  •  Comprehensive metabolic panel; Future  •  Hemoglobin A1C; Future    Essential hypertension  BP at goal.   Continue current medication regimen.              History of Present Illness {?Quick Links Encounters * My Last Note * Last Note in Specialty * Snapshot * Since Last Visit * History :[id number]}    Yoni Castillo is a 60 y.o. male with type 2 diabetes seen in follow up.  Reports complications of microalbuminuria. Denies recent severe hypoglycemic or severe hyperglycemic episodes. Denies any issues with his current regimen. Last A1C was 8.8 in February. Denies recent illness, hospitalization or steroid use.  Anticipating additional surgery on RUE. He sustained an injury from his dog in September and believes he will need at least two more procedures.     He has not been checking blood sugars  routinely. AM sugars lately have been between 100 - 120. While he was having dietary indiscretions, he noted AM sugars of 180. At that point he was having a PB&J sandwich nightly.     Patient has had a lot of stress since his last visit. His two dogs passed away and he used food as a coping mechanism. He continued eating poorly up until around a few weeks ago. He also had two cats pass away this year. Prior to the past few months, his sugars were well controlled with his diet. He reports stopping Trulicity was stopped around 1.5 months ago. He developed some itching around injection site, similar to what happened with Ozempic.  Patient is very motivated to improve his blood sugar through diet and exercise alone but is open to trialing Trulicity again if he is unable to achieve his goals.  He will reach out to the office if he will require a new Trulicity prescription.        Current regimen:  Jardiance 25 mg daily      Last Eye Exam: 11/15/2023  Last Foot Exam: 02/21/2025  Health Maintenance   Topic Date Due   • Diabetic Eye Exam  11/15/2024   • Diabetic Foot Exam  02/21/2026     Pertinent Medical History   Patient has been intolerant of Metformin and GLP-1 injectables have given him itching at the site of injection.         Review of Systems as per HPI         Medical History Reviewed by provider this encounter:  Tobacco  Allergies  Meds  Problems  Med Hx  Surg Hx  Fam Hx     .    Objective {?Quick Links Trend Vitals * Enter New Vitals * Results Review * Timeline (Adult) * Labs * Imaging * Cardiology * Procedures * Lung Cancer Screening * Surgical eConsent :[id number]}  /82 (BP Location: Left arm, Patient Position: Sitting, Cuff Size: Adult)   Ht 6' (1.829 m)   BMI 38.92 kg/m²      Body mass index is 38.92 kg/m².  Wt Readings from Last 3 Encounters:   03/12/25 130 kg (287 lb)   02/25/25 131 kg (288 lb 12.8 oz)   02/21/25 131 kg (288 lb)     Physical Exam  Vitals and nursing note reviewed.    Constitutional:       General: He is not in acute distress.     Appearance: He is well-developed. He is obese.   HENT:      Head: Normocephalic and atraumatic.   Eyes:      General: No scleral icterus.     Conjunctiva/sclera: Conjunctivae normal.   Pulmonary:      Effort: Pulmonary effort is normal.   Abdominal:      Palpations: Abdomen is soft.   Neurological:      Mental Status: He is alert.   Psychiatric:         Attention and Perception: Attention normal.         Labs:   Lab Results   Component Value Date    HGBA1C 8.8 (H) 02/21/2025    HGBA1C 6.4 10/30/2024    HGBA1C 8.0 (H) 06/12/2024     Lab Results   Component Value Date    CREATININE 1.40 (H) 02/21/2025    CREATININE 1.47 (H) 09/21/2024    CREATININE 1.41 (H) 09/20/2024    BUN 31 (H) 02/21/2025    K 5.0 02/21/2025     02/21/2025    CO2 26 02/21/2025     eGFR   Date Value Ref Range Status   02/21/2025 53 ml/min/1.73sq m Final     Lab Results   Component Value Date    HDL 48 02/21/2025    TRIG 178 (H) 02/21/2025     Lab Results   Component Value Date    ALT 18 02/21/2025    AST 14 02/21/2025    ALKPHOS 57 02/21/2025     Lab Results   Component Value Date    MMK5OKJTZDTX 0.458 07/02/2023    [id number] 1.227 06/16/2021       Patient Instructions   Continue your medications with the following changes:  Work on diet and exercise.   Monitor blood sugars regularly  Contact the office with any severe hypoglycemic or hyperglycemic events  Maintain appropriate diet and physical activity  Follow up in 4 months  Call with any questions, concerns, or refill requests  Obtain labs prior to your next appointment      Discussed with the patient and all questioned fully answered. He will call me if any problems arise.

## 2025-03-18 NOTE — ASSESSMENT & PLAN NOTE
Lab Results   Component Value Date    HGBA1C 8.8 (H) 02/21/2025   Current HbA1c represents poor control. Blood sugars unavailable for review today. HbA1c likely due to discontinuation of Trulicity and dietary indiscretion.   Continue current regimen with the following adjustments:  Patient to improve diet and activity  Patient will contact the office if he is unable to achieve goal blood glucose and will consider resuming Trulicity  Advised to adhere to diabetic diet, and recommended staying active/exercising routinely.  Discussed symptoms and treatment of hypoglycemia.    Recommended routine follow-up with Ophthalmology and foot exams.   Ordered blood work to complete prior to next visit.    Orders:  •  Comprehensive metabolic panel; Future  •  Hemoglobin A1C; Future

## 2025-03-18 NOTE — PATIENT INSTRUCTIONS
Continue your medications with the following changes:  Work on diet and exercise.   Monitor blood sugars regularly  Contact the office with any severe hypoglycemic or hyperglycemic events  Maintain appropriate diet and physical activity  Follow up in 4 months  Call with any questions, concerns, or refill requests  Obtain labs prior to your next appointment

## 2025-03-18 NOTE — H&P (VIEW-ONLY)
Name: Yoni Castillo      : 1965      MRN: [id number]  Encounter Provider: Shayna Kendall PA-C  Encounter Date: 3/18/2025   Encounter department: [facility] [place]    Chief Complaint   Patient presents with   • Diabetes Type 2   :  Assessment & Plan  Type 2 diabetes mellitus with chronic kidney disease, without long-term current use of insulin, unspecified CKD stage (HCC)    Lab Results   Component Value Date    HGBA1C 8.8 (H) 2025   Current HbA1c represents poor control. Blood sugars unavailable for review today. HbA1c likely due to discontinuation of Trulicity and dietary indiscretion.   Continue current regimen with the following adjustments:  Patient to improve diet and activity  Patient will contact the office if he is unable to achieve goal blood glucose and will consider resuming Trulicity  Advised to adhere to diabetic diet, and recommended staying active/exercising routinely.  Discussed symptoms and treatment of hypoglycemia.    Recommended routine follow-up with Ophthalmology and foot exams.   Ordered blood work to complete prior to next visit.    Orders:  •  Comprehensive metabolic panel; Future  •  Hemoglobin A1C; Future    Essential hypertension  BP at goal.   Continue current medication regimen.              History of Present Illness     Yoni Castillo is a 60 y.o. male with type 2 diabetes seen in follow up.  Reports complications of microalbuminuria. Denies recent severe hypoglycemic or severe hyperglycemic episodes. Denies any issues with his current regimen. Last A1C was 8.8 in February. Denies recent illness, hospitalization or steroid use.  Anticipating additional surgery on RUE. He sustained an injury from his dog in September and believes he will need at least two more procedures.     He has not been checking blood sugars routinely. AM sugars lately have been between 100 - 120. While he was having dietary indiscretions, he noted AM sugars  of 180. At that point he was having a PB&J sandwich nightly.     Patient has had a lot of stress since his last visit. His two dogs passed away and he used food as a coping mechanism. He continued eating poorly up until around a few weeks ago. He also had two cats pass away this year. Prior to the past few months, his sugars were well controlled with his diet. He reports stopping Trulicity was stopped around 1.5 months ago. He developed some itching around injection site, similar to what happened with Ozempic.  Patient is very motivated to improve his blood sugar through diet and exercise alone but is open to trialing Trulicity again if he is unable to achieve his goals.  He will reach out to the office if he will require a new Trulicity prescription.        Current regimen:  Jardiance 25 mg daily      Last Eye Exam: 11/15/2023  Last Foot Exam: 02/21/2025  Health Maintenance   Topic Date Due   • Diabetic Eye Exam  11/15/2024   • Diabetic Foot Exam  02/21/2026     Pertinent Medical History   Patient has been intolerant of Metformin and GLP-1 injectables have given him itching at the site of injection.         Review of Systems as per [facility]         Medical History Reviewed by provider this encounter:  Tobacco  Allergies  Meds  Problems  Med Hx  Surg Hx  Fam Hx     .    Objective   /82 (BP Location: Left arm, Patient Position: Sitting, Cuff Size: Adult)   Ht 6' (1.829 m)   BMI 38.92 kg/m²      Body mass index is 38.92 kg/m².  Wt Readings from Last 3 Encounters:   03/12/25 130 kg (287 lb)   02/25/25 131 kg (288 lb 12.8 oz)   02/21/25 131 kg (288 lb)     Physical Exam  Vitals and nursing note reviewed.   Constitutional:       General: He is not in acute distress.     Appearance: He is well-developed. He is obese.   HENT:      Head: Normocephalic and atraumatic.   Eyes:      General: No scleral icterus.     Conjunctiva/sclera: Conjunctivae normal.   Pulmonary:      Effort: Pulmonary effort is normal.    Abdominal:      Palpations: Abdomen is soft.   Neurological:      Mental Status: He is alert.   Psychiatric:         Attention and Perception: Attention normal.         Labs:   Lab Results   Component Value Date    HGBA1C 8.8 (H) 02/21/2025    HGBA1C 6.4 10/30/2024    HGBA1C 8.0 (H) 06/12/2024     Lab Results   Component Value Date    CREATININE 1.40 (H) 02/21/2025    CREATININE 1.47 (H) 09/21/2024    CREATININE 1.41 (H) 09/20/2024    BUN 31 (H) 02/21/2025    K 5.0 02/21/2025     02/21/2025    CO2 26 02/21/2025     eGFR   Date Value Ref Range Status   02/21/2025 53 ml/min/1.73sq m Final     Lab Results   Component Value Date    HDL 48 02/21/2025    TRIG 178 (H) 02/21/2025     Lab Results   Component Value Date    ALT 18 02/21/2025    AST 14 02/21/2025    ALKPHOS 57 02/21/2025     Lab Results   Component Value Date    [id number] 0.458 07/02/2023    [id number] 1.227 06/16/2021       Patient Instructions   Continue your medications with the following changes:  Work on diet and exercise.   Monitor blood sugars regularly  Contact the office with any severe hypoglycemic or hyperglycemic events  Maintain appropriate diet and physical activity  Follow up in 4 months  Call with any questions, concerns, or refill requests  Obtain labs prior to your next appointment      Discussed with the patient and all questioned fully answered. He will call me if any problems arise.

## 2025-03-20 ENCOUNTER — OFFICE VISIT (OUTPATIENT)
Dept: OCCUPATIONAL THERAPY | Age: 60
End: 2025-03-20
Payer: MEDICARE

## 2025-03-20 DIAGNOSIS — M65.949 TENOSYNOVITIS OF FINGER AND HAND: Primary | ICD-10-CM

## 2025-03-20 DIAGNOSIS — M25.631 WRIST STIFFNESS, RIGHT: ICD-10-CM

## 2025-03-20 DIAGNOSIS — M25.641 STIFFNESS OF FINGER JOINT OF RIGHT HAND: ICD-10-CM

## 2025-03-20 DIAGNOSIS — L08.9 ANIMAL BITE OF LEFT HAND WITH INFECTION, SUBSEQUENT ENCOUNTER: ICD-10-CM

## 2025-03-20 DIAGNOSIS — S61.452D ANIMAL BITE OF LEFT HAND WITH INFECTION, SUBSEQUENT ENCOUNTER: ICD-10-CM

## 2025-03-20 DIAGNOSIS — Z47.89 AFTERCARE FOLLOWING SURGERY OF THE MUSCULOSKELETAL SYSTEM: ICD-10-CM

## 2025-03-20 PROCEDURE — 97110 THERAPEUTIC EXERCISES: CPT

## 2025-03-20 PROCEDURE — 97140 MANUAL THERAPY 1/> REGIONS: CPT

## 2025-03-20 NOTE — PROGRESS NOTES
"Daily Note     Today's date: 3/20/2025  Patient name: Yoni Castillo  : 1965  MRN: [id number]  Referring provider: Yesenia Carrasquillo PA-C  Dx:   Encounter Diagnosis     ICD-10-CM    1. Tenosynovitis of finger and hand  M65.949       2. Animal bite of left hand with infection, subsequent encounter  S61.452D     L08.9       3. Wrist stiffness, right  M25.631       4. Stiffness of finger joint of right hand  M25.641       5. Aftercare following surgery of the musculoskeletal system  Z47.89             Start Time: 1500  Stop Time: 1548  Total time in clinic (min): 48 minutes    Subjective: \"It's more sore\"       Objective: See treatment diary below      Assessment: Tolerated treatment well. Pt reports having increased soreness in dorsum of hand however notes he has been using it more for daily activities. Removed what appeared to be a piece of a vicryl stitch from distal portion of scar site. Pt has surgery scheduled for 3/24, has post op appointment scheduled for the 3/27. Patient would benefit from continued OT.      Plan: Continue per plan of care.  Progress treatment as tolerated.       Precautions: R wrist extensor tenolysis, wrist capsulotomy and de quervain's release on     Manuals   re-eval  re-eval 2/28 3/6 3/10 3/13 3/17 3/20    STM   10' 10' 10' 10' 10' 10' 10'   Scar mob            edema    5' 5' 5' 5' 5' 5' 5'    Wound care                         Ther Ex                 wrist, digit PROM   2x20 2x20 2x20 2x20 2x20 2x20    Extrinsic stretches    Ball roll with stretch    Dumbbell hold in flexion - 4lb 2x20    Extrinsic stretches    Ball roll with stretch    Dumbbell hold in flexion - 4lb   Forearm, wrist, and digit AROM X20  2x30s each digit composite flexion and isolated MP flexion 2x30s each digit composite flexion and isolated MP flexion 2x30s each digit composite flexion and isolated MP flexion 2x30s each digit composite flexion and isolated MP flexion 2x30s each digit composite flexion " and isolated MP flexion 2x30s each digit composite flexion and isolated MP flexion 2x30s each digit composite flexion and isolated MP flexion   Tendon glides  2x20 comp fist 2x20 comp fist 2x20 comp fist 2x20 comp fist  2x20 comp fist 2x20 comp fist 2x20 comp fist   Gentle strengthening   and twist with flex bar Yellow  and twist with flex bar Large and small sized dowel press into red theraputty. Large and small sized dowel press into red theraputty   Large dowel press into green putty   Intrinsic stretches                                                                                                       Ther Activity             activity modification            Fine motor/coordination  Nuts/bolts Large pegs with green pegboard  Large pegs with green pegboard  Boading balls in-hand translation    Removing items from tan putty    Tan putty press with 2lb jessenia                                                              Neuro Re-Ed                                               Ortho Fit            Wrist/digit static progressive extension splint                                   Modalities            HP   5' 5' 5' 5' 5' 5' 5'

## 2025-03-24 ENCOUNTER — ANESTHESIA (OUTPATIENT)
Age: 60
End: 2025-03-24
Payer: MEDICARE

## 2025-03-24 ENCOUNTER — HOSPITAL ENCOUNTER (OUTPATIENT)
Age: 60
Setting detail: OUTPATIENT SURGERY
Discharge: HOME | End: 2025-03-24
Attending: ORTHOPAEDIC SURGERY | Admitting: ORTHOPAEDIC SURGERY
Payer: MEDICARE

## 2025-03-24 ENCOUNTER — APPOINTMENT (OUTPATIENT)
Dept: OCCUPATIONAL THERAPY | Age: 60
End: 2025-03-24
Payer: MEDICARE

## 2025-03-24 VITALS
HEIGHT: 72 IN | WEIGHT: 284 LBS | OXYGEN SATURATION: 92 % | BODY MASS INDEX: 38.47 KG/M2 | RESPIRATION RATE: 16 BRPM | HEART RATE: 80 BPM | DIASTOLIC BLOOD PRESSURE: 90 MMHG | TEMPERATURE: 98.3 F | SYSTOLIC BLOOD PRESSURE: 151 MMHG

## 2025-03-24 DIAGNOSIS — M25.631 WRIST STIFFNESS, RIGHT: ICD-10-CM

## 2025-03-24 DIAGNOSIS — M51.369 DDD (DEGENERATIVE DISC DISEASE), LUMBAR: ICD-10-CM

## 2025-03-24 DIAGNOSIS — M79.605 LEFT LEG PAIN: ICD-10-CM

## 2025-03-24 DIAGNOSIS — M77.8 RIGHT WRIST TENDONITIS: Primary | ICD-10-CM

## 2025-03-24 DIAGNOSIS — M25.641 STIFFNESS OF FINGER JOINT OF RIGHT HAND: ICD-10-CM

## 2025-03-24 DIAGNOSIS — M65.949 TENOSYNOVITIS OF FINGER AND HAND: ICD-10-CM

## 2025-03-24 DIAGNOSIS — G43.709 CHRONIC MIGRAINE WITHOUT AURA WITHOUT STATUS MIGRAINOSUS, NOT INTRACTABLE: ICD-10-CM

## 2025-03-24 DIAGNOSIS — Z47.89 AFTERCARE FOLLOWING SURGERY OF THE MUSCULOSKELETAL SYSTEM: ICD-10-CM

## 2025-03-24 PROCEDURE — 25115 REMOVE WRIST/FOREARM LESION: CPT

## 2025-03-24 PROCEDURE — 25115 REMOVE WRIST/FOREARM LESION: CPT | Performed by: ORTHOPAEDIC SURGERY

## 2025-03-24 RX ORDER — FENTANYL CITRATE/PF 50 MCG/ML
25 SYRINGE (ML) INJECTION
Status: DISCONTINUED | OUTPATIENT
Start: 2025-03-24 | End: 2025-03-24 | Stop reason: HOSPADM

## 2025-03-24 RX ORDER — LIDOCAINE HYDROCHLORIDE 10 MG/ML
INJECTION, SOLUTION EPIDURAL; INFILTRATION; INTRACAUDAL; PERINEURAL AS NEEDED
Status: DISCONTINUED | OUTPATIENT
Start: 2025-03-24 | End: 2025-03-24

## 2025-03-24 RX ORDER — METOCLOPRAMIDE HYDROCHLORIDE 5 MG/ML
10 INJECTION INTRAMUSCULAR; INTRAVENOUS EVERY 4 HOURS PRN
Status: DISCONTINUED | OUTPATIENT
Start: 2025-03-24 | End: 2025-03-24 | Stop reason: HOSPADM

## 2025-03-24 RX ORDER — ACETAMINOPHEN 500 MG
1000 TABLET ORAL EVERY 8 HOURS
Qty: 60 TABLET | Refills: 0 | Status: SHIPPED | OUTPATIENT
Start: 2025-03-24

## 2025-03-24 RX ORDER — ACETAMINOPHEN 325 MG/1
650 TABLET ORAL EVERY 6 HOURS PRN
Status: DISCONTINUED | OUTPATIENT
Start: 2025-03-24 | End: 2025-03-24 | Stop reason: HOSPADM

## 2025-03-24 RX ORDER — ACETAMINOPHEN 325 MG/1
975 TABLET ORAL ONCE
Status: COMPLETED | OUTPATIENT
Start: 2025-03-24 | End: 2025-03-24

## 2025-03-24 RX ORDER — DIPHENHYDRAMINE HYDROCHLORIDE 50 MG/ML
INJECTION, SOLUTION INTRAMUSCULAR; INTRAVENOUS AS NEEDED
Status: DISCONTINUED | OUTPATIENT
Start: 2025-03-24 | End: 2025-03-24

## 2025-03-24 RX ORDER — MIDAZOLAM HYDROCHLORIDE 2 MG/2ML
INJECTION, SOLUTION INTRAMUSCULAR; INTRAVENOUS AS NEEDED
Status: DISCONTINUED | OUTPATIENT
Start: 2025-03-24 | End: 2025-03-24

## 2025-03-24 RX ORDER — FENTANYL CITRATE 50 UG/ML
INJECTION, SOLUTION INTRAMUSCULAR; INTRAVENOUS AS NEEDED
Status: DISCONTINUED | OUTPATIENT
Start: 2025-03-24 | End: 2025-03-24

## 2025-03-24 RX ORDER — PROPOFOL 10 MG/ML
INJECTION, EMULSION INTRAVENOUS CONTINUOUS PRN
Status: DISCONTINUED | OUTPATIENT
Start: 2025-03-24 | End: 2025-03-24

## 2025-03-24 RX ORDER — ONDANSETRON 2 MG/ML
4 INJECTION INTRAMUSCULAR; INTRAVENOUS EVERY 4 HOURS PRN
Status: DISCONTINUED | OUTPATIENT
Start: 2025-03-24 | End: 2025-03-24 | Stop reason: HOSPADM

## 2025-03-24 RX ORDER — ROPIVACAINE HYDROCHLORIDE 5 MG/ML
INJECTION, SOLUTION EPIDURAL; INFILTRATION; PERINEURAL
Status: COMPLETED | OUTPATIENT
Start: 2025-03-24 | End: 2025-03-24

## 2025-03-24 RX ORDER — DEXAMETHASONE SODIUM PHOSPHATE 10 MG/ML
INJECTION, SOLUTION INTRAMUSCULAR; INTRAVENOUS AS NEEDED
Status: DISCONTINUED | OUTPATIENT
Start: 2025-03-24 | End: 2025-03-24

## 2025-03-24 RX ORDER — OXYCODONE HYDROCHLORIDE 5 MG/1
5 TABLET ORAL EVERY 6 HOURS PRN
Status: DISCONTINUED | OUTPATIENT
Start: 2025-03-24 | End: 2025-03-24 | Stop reason: HOSPADM

## 2025-03-24 RX ORDER — OXYCODONE HYDROCHLORIDE 5 MG/1
5 TABLET ORAL EVERY 6 HOURS PRN
Qty: 5 TABLET | Refills: 0 | Status: SHIPPED | OUTPATIENT
Start: 2025-03-24 | End: 2025-04-03

## 2025-03-24 RX ORDER — SODIUM CHLORIDE, SODIUM LACTATE, POTASSIUM CHLORIDE, CALCIUM CHLORIDE 600; 310; 30; 20 MG/100ML; MG/100ML; MG/100ML; MG/100ML
125 INJECTION, SOLUTION INTRAVENOUS CONTINUOUS
Status: DISCONTINUED | OUTPATIENT
Start: 2025-03-24 | End: 2025-03-24 | Stop reason: HOSPADM

## 2025-03-24 RX ORDER — DOCUSATE SODIUM 100 MG/1
100 CAPSULE, LIQUID FILLED ORAL DAILY PRN
Qty: 10 CAPSULE | Refills: 0 | Status: SHIPPED | OUTPATIENT
Start: 2025-03-24

## 2025-03-24 RX ORDER — PROPOFOL 10 MG/ML
INJECTION, EMULSION INTRAVENOUS AS NEEDED
Status: DISCONTINUED | OUTPATIENT
Start: 2025-03-24 | End: 2025-03-24

## 2025-03-24 RX ORDER — ONDANSETRON 4 MG/1
4 TABLET, FILM COATED ORAL EVERY 8 HOURS PRN
Qty: 10 TABLET | Refills: 0 | Status: SHIPPED | OUTPATIENT
Start: 2025-03-24

## 2025-03-24 RX ADMIN — ACETAMINOPHEN 975 MG: 325 TABLET ORAL at 07:54

## 2025-03-24 RX ADMIN — FENTANYL CITRATE 25 MCG: 50 INJECTION INTRAMUSCULAR; INTRAVENOUS at 09:45

## 2025-03-24 RX ADMIN — PROPOFOL 30 MG: 10 INJECTION, EMULSION INTRAVENOUS at 09:16

## 2025-03-24 RX ADMIN — FENTANYL CITRATE 25 MCG: 50 INJECTION INTRAMUSCULAR; INTRAVENOUS at 10:03

## 2025-03-24 RX ADMIN — SODIUM CHLORIDE, SODIUM LACTATE, POTASSIUM CHLORIDE, AND CALCIUM CHLORIDE 125 ML/HR: .6; .31; .03; .02 INJECTION, SOLUTION INTRAVENOUS at 08:04

## 2025-03-24 RX ADMIN — ROPIVACAINE HYDROCHLORIDE 25 ML: 5 INJECTION EPIDURAL; INFILTRATION; PERINEURAL at 08:54

## 2025-03-24 RX ADMIN — PROPOFOL 60 MCG/KG/MIN: 10 INJECTION, EMULSION INTRAVENOUS at 09:16

## 2025-03-24 RX ADMIN — LIDOCAINE HYDROCHLORIDE 50 MG: 10 SOLUTION INTRAVENOUS at 09:14

## 2025-03-24 RX ADMIN — Medication 3000 MG: at 09:15

## 2025-03-24 RX ADMIN — MIDAZOLAM 2 MG: 1 INJECTION INTRAMUSCULAR; INTRAVENOUS at 08:52

## 2025-03-24 RX ADMIN — DIPHENHYDRAMINE HYDROCHLORIDE 25 MG: 50 INJECTION, SOLUTION INTRAMUSCULAR; INTRAVENOUS at 09:19

## 2025-03-24 RX ADMIN — DEXAMETHASONE SODIUM PHOSPHATE 10 MG: 10 INJECTION INTRAMUSCULAR; INTRAVENOUS at 09:19

## 2025-03-24 RX ADMIN — FENTANYL CITRATE 50 MCG: 50 INJECTION INTRAMUSCULAR; INTRAVENOUS at 08:52

## 2025-03-24 NOTE — ANESTHESIA PREPROCEDURE EVALUATION
Procedure:  SYNOVECTOMY / DEBRIDEMENT - FLEXOR CARPI RADIALIS AND FLEXOR CARPI ULNARIS TENDONS- RIGHT, POSSIBLE FLEXOR TENOLYSIS (Right: Wrist)    Relevant Problems   CARDIO   (+) Essential hypertension      ENDO   (+) Type 2 diabetes mellitus with chronic kidney disease, without long-term current use of insulin (HCC)      /RENAL   (+) Chronic kidney disease, stage 3 (HCC)   (+) Staghorn calculus      HEMATOLOGY   (+) Anemia      MUSCULOSKELETAL   (+) Low back pain with sciatica      Lab Results   Component Value Date    WBC 4.88 02/21/2025    HGB 14.8 02/21/2025    HCT 44.3 02/21/2025    MCV 93 02/21/2025     02/21/2025     Lab Results   Component Value Date    K 5.0 02/21/2025    CO2 26 02/21/2025     02/21/2025    BUN 31 (H) 02/21/2025    CREATININE 1.40 (H) 02/21/2025     Lab Results   Component Value Date    INR 1.14 05/31/2022    INR 1.11 03/29/2022    PROTIME 14.3 05/31/2022    PROTIME 14.0 03/29/2022     Lab Results   Component Value Date    PTT 30 05/31/2022       Lab Results   Component Value Date    GLUCOSE 112 10/03/2024       Lab Results   Component Value Date    HGBA1C 8.8 (H) 02/21/2025       Type and Screen:  O    Physical Exam    Airway    Mallampati score: II  TM Distance: >3 FB  Neck ROM: full     Dental       Cardiovascular      Pulmonary      Other Findings        Anesthesia Plan  ASA Score- 3     Anesthesia Type- regional with ASA Monitors.         Additional Monitors:     Airway Plan:     Comment: Discussed long acting supraclavicular nerve block as primary anesthetic with risks/benefits/alternatives. Patient made aware of possible postoperative shortness of breath related to transient hemidiaphragmatic paralysis. Discussed extremely low likelihood of transient or permanent nerve injury in the setting of ultrasound guidance and patient participation. Sedation in the OR for patient comfort. Possibility of conversion to GA if incomplete or failed block, ventilation issues, or at  surgeon's request. Patient aware and would like to proceed.  .       Plan Factors-Exercise tolerance (METS): >4 METS.    Chart reviewed.   Existing labs reviewed. Patient summary reviewed.                  Induction- intravenous.    Postoperative Plan- Plan for postoperative opioid use.         Informed Consent- Anesthetic plan and risks discussed with patient.  I personally reviewed this patient with the CRNA. Discussed and agreed on the Anesthesia Plan with the CRNA..      NPO Status:  Vitals Value Taken Time   Date of last liquid 03/23/25 03/24/25 0747   Time of last liquid 2345 03/24/25 0747   Date of last solid 03/23/25 03/24/25 0747   Time of last solid 2000 03/24/25 0747

## 2025-03-24 NOTE — PROGRESS NOTES
called pt's wife @ 1122, wife states she is en route. Hour has passed, no sign of wife. Pt states wife has the GI bug. Food and drink offered to pt while waiting. Pt wishes to drive self home, instructed and educated pt that he must leave with a responsible  per post op protocol.    Touched base with pt, wife is currently on her way to  pt (@ 1415). Will monitor.

## 2025-03-24 NOTE — DISCHARGE INSTR - AVS FIRST PAGE
POST-OPERATIVE INSTRUCTIONS  FLEXOR TENDON DEBRIDEMENT    You have just undergone a flexor tendon debridement by Dr. Mccarthy in the operating room.  It is our wish that your postoperative recovery be as quick and comfortable as possible.  Please carefully review the following items that are important in your recovery.    Pain Control:  After any operation, a certain degree of pain is to be expected.  A prescription for narcotic pain medication as well as acetaminophen and/or ibuprofen has been electronically sent to your pharmacy. Do not exceed 3000 mg of Tylenol per day. This medication will relieve most of your pain but may not relieve all of the pain. Some pain is normal post operatively.     When you go home, please keep your operated arm elevated at all times (above the level of your heart.)  If you do this, your swelling will be diminished and your pain will be diminished as well.    You had a nerve block as part of your surgical anesthesia as well as to aid in your post-operative pain control. This nerve block may last 12-36 hrs after surgery. While your block is working, you will not be able to feel or move your operative arm and hand. Please wear your sling while the block is in place, except for changing clothes or hygiene purposes, to protect your arm. As the block wears off you will start to notice pain in your operative extremity. Please take pain medication as soon as you start to notice the block wearing off. This prevents your pain from becoming unmanageable when the block has completely worn off.      Dressing Care:  After surgery, make sure that your dressing is kept dry.  You can take a shower if you cover your arm with a plastic bag, such as a newspaper bag, and use tape or rubber bands. If your dressing gets wet you may take it off, place Band-Aids on the wound and re-cover it with sterile dressings which you can obtain at your local drug store.    Please remove your dressing down to the  incision 5 days after your surgery. For example, if your had surgery on a Monday, do this on Saturday.  If your surgery was on Thursday, do this on Tuesday.   If your dressing gets wet prior to removing it, please take if off and place something clean, or bandaids, on the wound. After removal of your surgical dressing, you may leave the incision open to air or cover with a Band-Aid. You may begin to shower regularly and allow the clean, soapy water to run over the incision site. Do not scrub the incision.     Weight Bearing:  You should NOT bear weight >5lbs through your operative extremity. Do not push off using your operative extremity.     It is ok to move your fingers as tolerated to prevent stiffness. You may also use your operative hand to assist with light activities of daily living such as putting on clothes, brushing your teeth and eating as tolerated    Please work on these finger range of motions exercises between now and you follow up visit:         Follow Up:  If you don't already have a scheduled postoperative appointment, please call our office for a follow-up appointment. It is best to call the day after surgery to make an appointment in 10-14 days.  At your first postoperative visit, you will be seen by either Dr. Mccarthy, his PA; or one of their associates. The sutures will be removed and you may be asked to see a hand therapist to optimize your functional result. Each of the hand therapists that you will be referred to have received special training in the care of the hand and upper extremity.    When to Call:  It is normal to see minor staining on the hand surgery dressing after surgery. If there is significant bleeding, you are advised to call the office during regular office hours to have this checked.     If you feel that you have a surgical emergency postoperatively that requires immediate attention, please call [phone]. In addition, any emergency can also be handled by the emergency room.       If you have questions regarding your surgery postop that you feel requires attention, please call the office.   If this occurs after our regular office hours, there is a message with instructions to talk to the physician on call.

## 2025-03-24 NOTE — ANESTHESIA POSTPROCEDURE EVALUATION
Post-Op Assessment Note    CV Status:  Stable    Pain management: adequate       Mental Status:  Alert and awake   Hydration Status:  Euvolemic   PONV Controlled:  Controlled   Airway Patency:  Patent     Post Op Vitals Reviewed: Yes    No anethesia notable event occurred.    Staff: Anesthesiologist           Last Filed PACU Vitals:  Vitals Value Taken Time   Temp 98.3 °F (36.8 °C) 03/24/25 1115   Pulse 88 03/24/25 1130   /92 03/24/25 1130   Resp 15 03/24/25 1130   SpO2 92 % 03/24/25 1130       Modified Rudy:     Vitals Value Taken Time   Activity 2 03/24/25 1130   Respiration 2 03/24/25 1130   Circulation 2 03/24/25 1130   Consciousness 2 03/24/25 1130   Oxygen Saturation 2 03/24/25 1130     Modified Rudy Score: 10

## 2025-03-24 NOTE — ANESTHESIA POSTPROCEDURE EVALUATION
Post-Op Assessment Note    CV Status:  Stable  Pain Score: 0    Pain management: adequate       Mental Status:  Sleepy   Hydration Status:  Euvolemic   PONV Controlled:  Controlled   Airway Patency:  Patent     Post Op Vitals Reviewed: Yes    No anethesia notable event occurred.    Staff: CRNA           Last Filed PACU Vitals:  Vitals Value Taken Time   Temp 97.5    Pulse 80 03/24/25 1018   /75 03/24/25 1017   Resp 12 03/24/25 1018   SpO2 97 % 03/24/25 1018   Vitals shown include unfiled device data.

## 2025-03-24 NOTE — ANESTHESIA PROCEDURE NOTES
Peripheral Block    Patient location during procedure: holding area  Start time: 3/24/2025 8:54 AM  Reason for block: at surgeon's request and post-op pain management  Staffing  Performed by: Tom Nava MD  Authorized by: Tom Nava MD    Preanesthetic Checklist  Completed: patient identified, IV checked, site marked, risks and benefits discussed, surgical consent, monitors and equipment checked, pre-op evaluation and timeout performed  Peripheral Block  Patient position: sitting  Prep: ChloraPrep  Patient monitoring: frequent blood pressure checks, continuous pulse oximetry and heart rate  Block type: Supraclavicular  Laterality: right  Injection technique: single-shot  Procedures: ultrasound guided, Ultrasound guidance required for the procedure to increase accuracy and safety of medication placement and decrease risk of complications.  Ultrasound permanent image saved  ropivacaine (NAROPIN) 0.5 % injection 20 mL - Perineural   25 mL - 3/24/2025 8:54:00 AM  Needle  Needle type: Stimuplex   Needle gauge: 20 G  Needle length: 4 in  Needle localization: anatomical landmarks and ultrasound guidance  Assessment  Injection assessment: incremental injection, frequent aspiration, injected with ease, negative aspiration, negative for heart rate change, no paresthesia on injection, no symptoms of intraneural/intravenous injection and needle tip visualized at all times  Paresthesia pain: none  Post-procedure:  site cleaned  patient tolerated the procedure well with no immediate complications

## 2025-03-24 NOTE — OP NOTE
OPERATIVE REPORT  PATIENT NAME: Yoni Castillo    :  1965  MRN: [id number]  Pt Location: [facility] OR ROOM 06    SURGERY DATE: 3/24/2025    Surgeons and Role:     * Carroll Mccarthy MD - Primary     * Yesenia Carrasquillo PA-C - Assisting    Preop Diagnosis:  Finger stiffness, right [M25.641]  Right wrist tendonitis [M77.8]    Post-Op Diagnosis Codes:     * Finger stiffness, right [M25.641]     * Right wrist tendonitis [M77.8]    Procedure(s):  Right - TENOSYNOVECTOMY / DEBRIDEMENT - FLEXOR CARPI RADIALIS AND FLEXOR CARPI ULNARIS TENDONS- RIGHT    Specimen(s):  * No specimens in log *    Estimated Blood Loss:   Minimal    Drains:  * No LDAs found *    Anesthesia Type:   Regional with Sedation    Operative Indications:  Finger stiffness, right [M25.641]  Right wrist tendonitis [M77.8]    60-year-old male status post multiple surgeries for infection and scar release to the right hand and wrist.  He has continued pain at the right FCR and FCU tendinitis areas despite several months of nonoperative management.  Corticosteroid injections are contraindicated in his case due to his diabetes.  After shared decision making process he like to proceed with surgical intervention for FCR tunnel release and FCR and FCU tenolysis with tenosynovectomy    Operative Findings:  Scarring around the FCR tendon and sheath with an inflamed tenosynovium.  Some inflamed tenosynovium surrounding the FCU tendon.  The ulnar artery is calcified.      Complications:   None    Procedure and Technique:  The patient was greeted in the preoperative area. The risks, benefits, and alternatives of the procedure were again discussed in detail with the patient.  The patient was amenable to proceed. The operative extremity was marked. Patient was brought to the operating room and placed under anesthesia. A tourniquet was applied to the operative extremity. The operative extremity was prepped and draped in the usual sterile fashion. 3 g ancef were  administered for pre-operative antibiotic prophylaxis. A timeout was performed identifying the name and laterality of the procedure. The limb was exsanguinated and the tourniquet was inflated.     We started on the radial side.  The previous surgical incision was utilized.  We sharply dissected through the scar tissue subcutaneously until the FCR tendon sheath was identified.  We used tenotomy scissors to release scar tissue surrounding the FCR tendon and between the subcutaneous tissues and deeper structures, mobilizing the skin edges.  We then incised the FCR tunnel continue this distally releasing it to the level of the trapezium.  We are careful to protect the palmar cutaneous branch of the median nerve ulnarly.  There was some inflamed tenosynovium surrounding FCR tendon which was excised using tenotomy scissors to perform our tenosynovectomy.  Once the tendon was appropriately debrided and mobilized we proceeded to the FCU.    A Lamin incision was made over the ulnar wrist at the FCU tendon.  This is carried down to subcutaneous tissues and the tendon was identified.  There is some inflamed tenosynovium surrounding tendon which was excisionally debrided, completing our tenosynovectomy.  He had a low-lying FCU muscle belly which was preserved.  We visualized the ulnar nerve and artery which were intact.  The ulnar artery was palpated and appeared calcified.  No significant scarring or constrictors on the ulnar nerve or artery.    The tourniquet was released and hemostasis achieved. The wound was closed in an interrupted fashion. Sterile dressings were applied. The patient was awoken and transported to the recovery room in stable condition.     I was present for the entire procedure., A qualified resident physician was not available., and A physician assistant was required during the procedure for retraction, tissue handling, dissection and suturing.    Patient Disposition:  PACU              SIGNATURE: Carroll  Yoni Mccarthy MD  DATE: March 24, 2025  TIME: 10:12 AM

## 2025-03-25 RX ORDER — TRAMADOL HYDROCHLORIDE 50 MG/1
100 TABLET ORAL
Qty: 60 TABLET | Refills: 0 | Status: SHIPPED | OUTPATIENT
Start: 2025-03-25

## 2025-03-25 RX ORDER — AMITRIPTYLINE HYDROCHLORIDE 10 MG/1
TABLET ORAL
Qty: 150 TABLET | Refills: 0 | OUTPATIENT
Start: 2025-03-25

## 2025-03-25 NOTE — TELEPHONE ENCOUNTER
Attempted to reach pt. [facility] advising pt that we are responding to his request. CB# and OH provided. Pt also advised he can respond on mychart as well.

## 2025-03-26 RX ORDER — METHOCARBAMOL 750 MG/1
750 TABLET, FILM COATED ORAL DAILY PRN
Qty: 30 TABLET | Refills: 0 | Status: SHIPPED | OUTPATIENT
Start: 2025-03-26

## 2025-03-27 ENCOUNTER — OFFICE VISIT (OUTPATIENT)
Dept: OCCUPATIONAL THERAPY | Age: 60
End: 2025-03-27
Payer: MEDICARE

## 2025-03-27 DIAGNOSIS — S61.452D ANIMAL BITE OF LEFT HAND WITH INFECTION, SUBSEQUENT ENCOUNTER: ICD-10-CM

## 2025-03-27 DIAGNOSIS — L08.9 ANIMAL BITE OF LEFT HAND WITH INFECTION, SUBSEQUENT ENCOUNTER: ICD-10-CM

## 2025-03-27 DIAGNOSIS — M25.641 STIFFNESS OF FINGER JOINT OF RIGHT HAND: ICD-10-CM

## 2025-03-27 DIAGNOSIS — M77.8 RIGHT WRIST TENDONITIS: ICD-10-CM

## 2025-03-27 DIAGNOSIS — M25.631 WRIST STIFFNESS, RIGHT: ICD-10-CM

## 2025-03-27 DIAGNOSIS — Z47.89 AFTERCARE FOLLOWING SURGERY OF THE MUSCULOSKELETAL SYSTEM: ICD-10-CM

## 2025-03-27 DIAGNOSIS — M65.949 TENOSYNOVITIS OF FINGER AND HAND: Primary | ICD-10-CM

## 2025-03-27 PROCEDURE — 97168 OT RE-EVAL EST PLAN CARE: CPT

## 2025-03-27 PROCEDURE — 97110 THERAPEUTIC EXERCISES: CPT

## 2025-03-27 NOTE — PROGRESS NOTES
OT Re-Evaluation     Today's date: 3/27/2025  Patient name: Yoni Castillo  : 1965  MRN: [id number]  Referring provider: Yesenia Carrasquillo PA-C  Dx:   Encounter Diagnosis     ICD-10-CM    1. Tenosynovitis of finger and hand  M65.949 Ambulatory referral to PT/OT hand therapy      2. Animal bite of left hand with infection, subsequent encounter  S61.452D     L08.9       3. Wrist stiffness, right  M25.631 Ambulatory referral to PT/OT hand therapy      4. Stiffness of finger joint of right hand  M25.641 Ambulatory referral to PT/OT hand therapy      5. Right wrist tendonitis  M77.8 Ambulatory referral to PT/OT hand therapy      6. Aftercare following surgery of the musculoskeletal system  Z47.89 Ambulatory referral to PT/OT hand therapy          Start Time: 1440  Stop Time: 1525  Total time in clinic (min): 45 minutes    Assessment  Impairments: abnormal or restricted ROM, activity intolerance, impaired physical strength, lacks appropriate home exercise program and weight-bearing intolerance  Symptom irritability: moderate    Assessment details: Pt presents for OT re-eval s/p FCR and FCU debridement/synovectomy on 3/24. Subjectively, pt has significant pain at rest today. He has mild swelling at the wrist and palm as expected. Pt reports he is attempting to use R hand as much as possible throughout his normal routine. Objectively, he has decreased AROM at today's session compared to previous sessions. OT recommending services 2x/week for 8 weeks to address deficits. Pt understands/agrees with current POC.   Understanding of Dx/Px/POC: good     Prognosis: good    Goals  Short term goals:  To be achieved within 2-3 visits from start of care on 25.   Patient will be independent with donning/doffing orthotic and report compliance with recommended wear and care instructions.    Patient will demonstrate independence with scar mobilization techniques and post-op wound care instructions.   Patient will demonstrate  independence with all AROM and stretching HEPs.  Patient will verbalize understanding of activity limitations within post-op precautions for improved symptom management.   Patient will verbalize understanding of activity modifications to maximize functional use of R hand throughout normal routine.   Patient will tolerate therapeutic exercises/activities with reports of pain <3/10.      Long term goals:  To be achieved at time of discharge:   Patient will demonstrate improved AROM to WFL compared to uninvolved side.  Patient will begin to report decreased pain with completion of ADLs (donning shoes/dressing, etc.).   Patient will begin to report improved completion of IADLs (cooking, washing dishes, cleaning, etc.) with implementation of recommended symptom management strategies.   Patient will begin to report improved participation/engagement in desired leisure occupations (model cars).   Patient will demonstrate improvements with  and pinch strength to within 25% of their uninvolved side for increased readiness to return to household chores/yard work.   Patient will report readiness for discharge from OT services.              Plan  Planned modality interventions: thermotherapy: hydrocollator packs and ultrasound  Other planned modality interventions: IASTM    Planned therapy interventions: IADL retraining, manual therapy, activity modification, fine motor coordination training, flexibility, functional ROM exercises, home exercise program, graded exercise, graded activity, therapeutic exercise, therapeutic activities, stretching, strengthening, patient education, orthotic fitting/training, joint mobilization, orthotic management and training, work reintegration, dressing changes, balance/weight bearing training and ADL retraining    Frequency: 2x week  Duration in weeks: 8  Plan of Care beginning date: 3/27/2025  Plan of Care expiration date: 6/24/2025  Treatment plan discussed with: patient        Subjective  Evaluation    History of Present Illness  Mechanism of injury: surgery  Mechanism of injury: Pt is a known 61 yo RHD male who returns to therapy following multiple surgeries to the R forearm, wrist, hand that are all secondary to a dog bite injury that led to an infection. Most recently, pt underwent R FCR and FCU debridement and tenosynovectomy on 3/24. He continues   Pain  Current pain ratin          Objective    Tissue Integrity: closed incisions over volar ulnar and radial wrists, sutures intact, no bleeding or drainage.     Sensation (Ten-Test )  Right Hand (thumb to small finger): 10/10, 10/10, 10/10, 10/10, 10/10  Left Hand (thumb to small finger): 10/10, 10/10, 10/10, 10/10, 10/10    Special Tests: n/a    Edema (circumferential) (cm):    Right   Wrist crease 21.5   Palm 24       AROM     Elbow/Forearm   Right   Supination 45   Pronation 87     Wrist   Right   Flexion 24   Extension 25   Radial Dev. 2   Ulnar Dev. 7     Right Hand (ext/flex)   D2 D3 D4 D5   MCP 40 50 45 40   PIP 76 78 -14/84 -14/70   DIP 45 53 27 45   Kapandji Score (Opposition) 5/10            Thumb   Right   MP  40   IP 57        Precautions: 3/24 FCR and FCU debridement and tenosynovectomy     Manuals  3/27                 Edema mob                  Scar mob              STM                                                        Ther Ex                       Forearm AROM 2x20                Wrist P/AROM 2x20             Tendon glides 2x20             Thumb P/AROM X20 all planes                                                                                                                                               Ther Activity                   Grasp/release                                                                                                                     Neuro Re-Ed                                                               Ortho Fit                                                               Modalities                   HP

## 2025-03-31 ENCOUNTER — TELEPHONE (OUTPATIENT)
Dept: RADIOLOGY | Facility: HOSPITAL | Age: 60
End: 2025-03-31

## 2025-03-31 ENCOUNTER — OFFICE VISIT (OUTPATIENT)
Dept: OCCUPATIONAL THERAPY | Age: 60
End: 2025-03-31
Payer: MEDICARE

## 2025-03-31 DIAGNOSIS — M25.641 STIFFNESS OF FINGER JOINT OF RIGHT HAND: ICD-10-CM

## 2025-03-31 DIAGNOSIS — M25.631 WRIST STIFFNESS, RIGHT: ICD-10-CM

## 2025-03-31 DIAGNOSIS — Z47.89 AFTERCARE FOLLOWING SURGERY OF THE MUSCULOSKELETAL SYSTEM: ICD-10-CM

## 2025-03-31 DIAGNOSIS — M65.949 TENOSYNOVITIS OF FINGER AND HAND: Primary | ICD-10-CM

## 2025-03-31 DIAGNOSIS — L08.9 ANIMAL BITE OF LEFT HAND WITH INFECTION, SUBSEQUENT ENCOUNTER: ICD-10-CM

## 2025-03-31 DIAGNOSIS — M77.8 RIGHT WRIST TENDONITIS: ICD-10-CM

## 2025-03-31 DIAGNOSIS — S61.452D ANIMAL BITE OF LEFT HAND WITH INFECTION, SUBSEQUENT ENCOUNTER: ICD-10-CM

## 2025-03-31 PROCEDURE — 97140 MANUAL THERAPY 1/> REGIONS: CPT

## 2025-03-31 PROCEDURE — 97110 THERAPEUTIC EXERCISES: CPT

## 2025-03-31 NOTE — PROGRESS NOTES
"Daily Note     Today's date: 3/31/2025  Patient name: Yoni Castillo  : 1965  MRN: [id number]  Referring provider: Yesenia Carrasquillo PA-C  Dx:   Encounter Diagnosis     ICD-10-CM    1. Tenosynovitis of finger and hand  M65.949       2. Animal bite of left hand with infection, subsequent encounter  S61.452D     L08.9       3. Wrist stiffness, right  M25.631       4. Stiffness of finger joint of right hand  M25.641       5. Right wrist tendonitis  M77.8       6. Aftercare following surgery of the musculoskeletal system  Z47.89           Start Time: 1400  Stop Time: 1445  Total time in clinic (min): 45 minutes    Subjective: \"It's feeling a little more swollen and stiff. I'm using it.\"      Objective: See treatment diary below    Wrist flexion: 30  Wrist extension: 40    Assessment: Tolerated treatment well. Pt reports he is using his hand throughout his normal routine. He feels his fingers are a bit more stiff and swollen compared to last week. PROM of the fingers appears to be improving. Patient would benefit from continued OT      Plan: Continue per plan of care.  Progress treatment as tolerated.       Precautions: 3/24 FCR and FCU debridement and tenosynovectomy     Manuals  3/27  3/31               Edema mob    10'              Scar mob              STM    5'                                                    Ther Ex                       Forearm AROM 2x20  2x20              Wrist P/AROM 2x20 2x20            Tendon glides 2x20 2x20            Thumb P/AROM X20 all planes X20 all planes                                                                                                                                              Ther Activity                   Grasp/release                                                                                                                     Neuro Re-Ed                                                               Ortho Fit                                               "                 Modalities                  HP    5'

## 2025-04-02 NOTE — PRE-PROCEDURE INSTRUCTIONS
Pre-Surgery Instructions:   Medication Instructions    acetaminophen (TYLENOL) 500 mg tablet Take day of surgery.    amitriptyline (ELAVIL) 10 mg tablet Take night before surgery    ammonium lactate (LAC-HYDRIN) 12 % lotion Hold day of surgery.    carvedilol (COREG) 25 mg tablet Take day of surgery.    Empagliflozin (Jardiance) 25 MG TABS Stop taking 4 days prior to surgery.    ketoconazole (NIZORAL) 2 % cream Hold day of surgery.    lidocaine (Lidoderm) 5 % Hold day of surgery.    lisinopril-hydrochlorothiazide (PRINZIDE,ZESTORETIC) 20-12.5 MG per tablet Hold day of surgery.    methocarbamol (ROBAXIN) 750 mg tablet Uses PRN- OK to take day of surgery    ondansetron (ZOFRAN) 4 mg tablet Uses PRN- OK to take day of surgery    traMADol (Ultram) 50 mg tablet Uses PRN- OK to take day of surgery    Ubrogepant (UBRELVY) 100 MG tablet Uses PRN- OK to take day of surgery    [DISCONTINUED] acetaminophen (TYLENOL) 500 mg tablet Take day of surgery.    [DISCONTINUED] carvedilol (COREG) 25 mg tablet Take day of surgery.    [DISCONTINUED] Empagliflozin (Jardiance) 25 MG TABS Stop taking 4 days prior to surgery.    [DISCONTINUED] lidocaine (Lidoderm) 5 % Hold day of surgery.    [DISCONTINUED] lisinopril-hydrochlorothiazide (PRINZIDE,ZESTORETIC) 20-12.5 MG per tablet Hold day of surgery.    [DISCONTINUED] methocarbamol (ROBAXIN) 750 mg tablet Uses PRN- OK to take day of surgery    [DISCONTINUED] rimegepant sulfate (Nurtec) 75 mg TBDP N/A    [DISCONTINUED] traMADol (Ultram) 50 mg tablet Uses PRN- OK to take day of surgery   Medication instructions for day of surgery reviewed. Please take all instructed medications with only a sip of water.       You will receive a call one business day prior to surgery with an arrival time and hospital directions. If your surgery is scheduled on a Monday, the hospital will be calling you on the Friday prior to your surgery. If you have not heard from anyone by 8pm, please call the hospital  supervisor through the hospital  at [phone]. ([place] [phone] or [place] [phone]).    Do not eat or drink anything after midnight the night before your surgery, including candy, mints, lifesavers, or chewing gum. Do not drink alcohol 24hrs before your surgery. Try not to smoke at least 24hrs before your surgery.       Follow the pre surgery showering instructions as listed in the “My Surgical Experience Booklet” or otherwise provided by your surgeon's office. Do not use a blade to shave the surgical area 1 week before surgery. It is okay to use a clean electric clippers up to 24 hours before surgery. Do not apply any lotions, creams, including makeup, cologne, deodorant, or perfumes after showering on the day of your surgery. Do not use dry shampoo, hair spray, hair gel, or any type of hair products.     No contact lenses, eye make-up, or artificial eyelashes. Remove nail polish, including gel polish, and any artificial, gel, or acrylic nails if possible. Remove all jewelry including rings and body piercing jewelry.     Wear causal clothing that is easy to take on and off. Consider your type of surgery.    Keep any valuables, jewelry, piercings at home. Please bring any specially ordered equipment (sling, braces) if indicated.    Arrange for a responsible person to drive you to and from the hospital on the day of your surgery. Please confirm the visitor policy for the day of your procedure when you receive your phone call with an arrival time.     Call the surgeon's office with any new illnesses, exposures, or additional questions prior to surgery.    Please reference your “My Surgical Experience Booklet” for additional information to prepare for your upcoming surgery.

## 2025-04-02 NOTE — PROGRESS NOTES
"Daily Note     Today's date: 4/3/2025  Patient name: Yoni Castillo  : 1965  MRN: [id number]  Referring provider: Yesenia Carrasquillo PA-C  Dx:   Encounter Diagnosis     ICD-10-CM    1. Tenosynovitis of finger and hand  M65.949       2. Animal bite of left hand with infection, subsequent encounter  S61.452D     L08.9       3. Wrist stiffness, right  M25.631       4. Stiffness of finger joint of right hand  M25.641       5. Aftercare following surgery of the musculoskeletal system  Z47.89       6. Right wrist tendonitis  M77.8           Start Time: 1400  Stop Time: 1445  Total time in clinic (min): 45 minutes    Subjective: \"Same old.\"      Objective: See treatment diary below    Assessment: Tolerated treatment well. Pt demonstrates improved digit opposition and feels his thumb stiffness is improving. Pt tolerated sustained  and weight bearing through the right hand today. Digit AROM is improving. Patient would benefit from continued OT      Plan: Continue per plan of care.  Progress treatment as tolerated.       Precautions: 3/24 FCR and FCU debridement and tenosynovectomy     Manuals  3/27  3/31  4/3             Edema mob    10'  10'            Scar mob              STM    5'  5'                                                  Ther Ex                       Forearm AROM 2x20  2x20  2x20            Wrist P/AROM 2x20 2x20 2x20           Tendon glides 2x20 2x20 2x20           Thumb P/AROM X20 all planes X20 all planes X20 all planes                         Strengthening   Puttycise \"L\" tool 10'                                                                                                                 Ther Activity                   Grasp/release                                                                                                                     Neuro Re-Ed                                                               Ortho Fit                                                             "   Modalities                  HP    5' 5'

## 2025-04-03 ENCOUNTER — OFFICE VISIT (OUTPATIENT)
Dept: OCCUPATIONAL THERAPY | Age: 60
End: 2025-04-03
Payer: MEDICARE

## 2025-04-03 DIAGNOSIS — S61.452D ANIMAL BITE OF LEFT HAND WITH INFECTION, SUBSEQUENT ENCOUNTER: ICD-10-CM

## 2025-04-03 DIAGNOSIS — M25.641 STIFFNESS OF FINGER JOINT OF RIGHT HAND: ICD-10-CM

## 2025-04-03 DIAGNOSIS — L08.9 ANIMAL BITE OF LEFT HAND WITH INFECTION, SUBSEQUENT ENCOUNTER: ICD-10-CM

## 2025-04-03 DIAGNOSIS — Z47.89 AFTERCARE FOLLOWING SURGERY OF THE MUSCULOSKELETAL SYSTEM: ICD-10-CM

## 2025-04-03 DIAGNOSIS — M25.631 WRIST STIFFNESS, RIGHT: ICD-10-CM

## 2025-04-03 DIAGNOSIS — M77.8 RIGHT WRIST TENDONITIS: ICD-10-CM

## 2025-04-03 DIAGNOSIS — M65.949 TENOSYNOVITIS OF FINGER AND HAND: Primary | ICD-10-CM

## 2025-04-03 PROCEDURE — 97110 THERAPEUTIC EXERCISES: CPT

## 2025-04-03 PROCEDURE — 97140 MANUAL THERAPY 1/> REGIONS: CPT

## 2025-04-06 ENCOUNTER — APPOINTMENT (OUTPATIENT)
Dept: LAB | Facility: HOSPITAL | Age: 60
End: 2025-04-06
Payer: MEDICARE

## 2025-04-06 DIAGNOSIS — E11.22 TYPE 2 DIABETES MELLITUS WITH CHRONIC KIDNEY DISEASE, WITHOUT LONG-TERM CURRENT USE OF INSULIN, UNSPECIFIED CKD STAGE (HCC): ICD-10-CM

## 2025-04-06 DIAGNOSIS — N20.0 KIDNEY STONE: ICD-10-CM

## 2025-04-06 DIAGNOSIS — R39.89 SUSPECTED UTI: ICD-10-CM

## 2025-04-06 LAB
ALBUMIN SERPL BCG-MCNC: 4.3 G/DL (ref 3.5–5)
ALP SERPL-CCNC: 64 U/L (ref 34–104)
ALT SERPL W P-5'-P-CCNC: 15 U/L (ref 7–52)
ANION GAP SERPL CALCULATED.3IONS-SCNC: 5 MMOL/L (ref 4–13)
AST SERPL W P-5'-P-CCNC: 14 U/L (ref 13–39)
BILIRUB SERPL-MCNC: 0.37 MG/DL (ref 0.2–1)
BUN SERPL-MCNC: 36 MG/DL (ref 5–25)
CALCIUM SERPL-MCNC: 9.4 MG/DL (ref 8.4–10.2)
CHLORIDE SERPL-SCNC: 98 MMOL/L (ref 96–108)
CO2 SERPL-SCNC: 28 MMOL/L (ref 21–32)
CREAT SERPL-MCNC: 1.36 MG/DL (ref 0.6–1.3)
EST. AVERAGE GLUCOSE BLD GHB EST-MCNC: 252 MG/DL
GFR SERPL CREATININE-BSD FRML MDRD: 55 ML/MIN/1.73SQ M
GLUCOSE P FAST SERPL-MCNC: 260 MG/DL (ref 65–99)
HBA1C MFR BLD: 10.4 %
POTASSIUM SERPL-SCNC: 4.9 MMOL/L (ref 3.5–5.3)
PROT SERPL-MCNC: 7.7 G/DL (ref 6.4–8.4)
SODIUM SERPL-SCNC: 131 MMOL/L (ref 135–147)

## 2025-04-06 PROCEDURE — 80053 COMPREHEN METABOLIC PANEL: CPT

## 2025-04-06 PROCEDURE — 36415 COLL VENOUS BLD VENIPUNCTURE: CPT

## 2025-04-06 PROCEDURE — 83036 HEMOGLOBIN GLYCOSYLATED A1C: CPT

## 2025-04-06 PROCEDURE — 87086 URINE CULTURE/COLONY COUNT: CPT

## 2025-04-07 ENCOUNTER — ANESTHESIA (OUTPATIENT)
Dept: RADIOLOGY | Facility: HOSPITAL | Age: 60
End: 2025-04-07
Payer: MEDICARE

## 2025-04-07 ENCOUNTER — HOSPITAL ENCOUNTER (OUTPATIENT)
Dept: RADIOLOGY | Facility: HOSPITAL | Age: 60
Discharge: HOME | End: 2025-04-07
Attending: RADIOLOGY
Payer: MEDICARE

## 2025-04-07 ENCOUNTER — ANESTHESIA EVENT (OUTPATIENT)
Dept: RADIOLOGY | Facility: HOSPITAL | Age: 60
End: 2025-04-07
Payer: MEDICARE

## 2025-04-07 ENCOUNTER — TELEPHONE (OUTPATIENT)
Dept: ENDOCRINOLOGY | Facility: CLINIC | Age: 60
End: 2025-04-07

## 2025-04-07 VITALS
DIASTOLIC BLOOD PRESSURE: 99 MMHG | TEMPERATURE: 97.5 F | OXYGEN SATURATION: 91 % | BODY MASS INDEX: 38.76 KG/M2 | HEIGHT: 72 IN | HEART RATE: 68 BPM | WEIGHT: 286.16 LBS | RESPIRATION RATE: 18 BRPM | SYSTOLIC BLOOD PRESSURE: 185 MMHG

## 2025-04-07 DIAGNOSIS — E11.22 TYPE 2 DIABETES MELLITUS WITH CHRONIC KIDNEY DISEASE, WITHOUT LONG-TERM CURRENT USE OF INSULIN, UNSPECIFIED CKD STAGE (HCC): Primary | ICD-10-CM

## 2025-04-07 DIAGNOSIS — N20.0 STAGHORN CALCULUS: ICD-10-CM

## 2025-04-07 LAB
GLUCOSE SERPL-MCNC: 202 MG/DL (ref 65–140)
GLUCOSE SERPL-MCNC: 244 MG/DL (ref 65–140)
GLUCOSE SERPL-MCNC: 251 MG/DL (ref 65–140)

## 2025-04-07 PROCEDURE — C1894 INTRO/SHEATH, NON-LASER: HCPCS

## 2025-04-07 PROCEDURE — 82948 REAGENT STRIP/BLOOD GLUCOSE: CPT

## 2025-04-07 PROCEDURE — C1769 GUIDE WIRE: HCPCS

## 2025-04-07 PROCEDURE — 50433 PLMT NEPHROURETERAL CATHETER: CPT

## 2025-04-07 PROCEDURE — C2617 STENT, NON-COR, TEM W/O DEL: HCPCS

## 2025-04-07 PROCEDURE — 50433 PLMT NEPHROURETERAL CATHETER: CPT | Performed by: RADIOLOGY

## 2025-04-07 RX ORDER — LIDOCAINE HYDROCHLORIDE 20 MG/ML
INJECTION, SOLUTION EPIDURAL; INFILTRATION; INTRACAUDAL; PERINEURAL AS NEEDED
Status: DISCONTINUED | OUTPATIENT
Start: 2025-04-07 | End: 2025-04-07

## 2025-04-07 RX ORDER — LEVOFLOXACIN 5 MG/ML
750 INJECTION, SOLUTION INTRAVENOUS ONCE
Status: COMPLETED | OUTPATIENT
Start: 2025-04-07 | End: 2025-04-07

## 2025-04-07 RX ORDER — PEN NEEDLE, DIABETIC 32GX 5/32"
NEEDLE, DISPOSABLE MISCELLANEOUS DAILY
Qty: 100 EACH | Refills: 1 | Status: SHIPPED | OUTPATIENT
Start: 2025-04-07

## 2025-04-07 RX ORDER — SUCCINYLCHOLINE/SOD CL,ISO/PF 100 MG/5ML
SYRINGE (ML) INTRAVENOUS AS NEEDED
Status: DISCONTINUED | OUTPATIENT
Start: 2025-04-07 | End: 2025-04-07

## 2025-04-07 RX ORDER — SODIUM CHLORIDE 9 MG/ML
100 INJECTION, SOLUTION INTRAVENOUS CONTINUOUS
Status: DISCONTINUED | OUTPATIENT
Start: 2025-04-07 | End: 2025-04-08 | Stop reason: HOSPADM

## 2025-04-07 RX ORDER — LIDOCAINE WITH 8.4% SOD BICARB 0.9%(10ML)
SYRINGE (ML) INJECTION AS NEEDED
Status: COMPLETED | OUTPATIENT
Start: 2025-04-07 | End: 2025-04-07

## 2025-04-07 RX ORDER — OXYCODONE HYDROCHLORIDE 5 MG/1
5 TABLET ORAL ONCE AS NEEDED
Refills: 0 | Status: DISCONTINUED | OUTPATIENT
Start: 2025-04-07 | End: 2025-04-08 | Stop reason: HOSPADM

## 2025-04-07 RX ORDER — PROPOFOL 10 MG/ML
INJECTION, EMULSION INTRAVENOUS AS NEEDED
Status: DISCONTINUED | OUTPATIENT
Start: 2025-04-07 | End: 2025-04-07

## 2025-04-07 RX ORDER — SODIUM CHLORIDE, SODIUM LACTATE, POTASSIUM CHLORIDE, CALCIUM CHLORIDE 600; 310; 30; 20 MG/100ML; MG/100ML; MG/100ML; MG/100ML
INJECTION, SOLUTION INTRAVENOUS CONTINUOUS PRN
Status: DISCONTINUED | OUTPATIENT
Start: 2025-04-07 | End: 2025-04-07

## 2025-04-07 RX ORDER — ONDANSETRON 2 MG/ML
4 INJECTION INTRAMUSCULAR; INTRAVENOUS ONCE AS NEEDED
Status: DISCONTINUED | OUTPATIENT
Start: 2025-04-07 | End: 2025-04-07 | Stop reason: HOSPADM

## 2025-04-07 RX ORDER — ROCURONIUM BROMIDE 10 MG/ML
INJECTION, SOLUTION INTRAVENOUS AS NEEDED
Status: DISCONTINUED | OUTPATIENT
Start: 2025-04-07 | End: 2025-04-07

## 2025-04-07 RX ORDER — DIPHENHYDRAMINE HYDROCHLORIDE 50 MG/ML
12.5 INJECTION, SOLUTION INTRAMUSCULAR; INTRAVENOUS ONCE AS NEEDED
Status: DISCONTINUED | OUTPATIENT
Start: 2025-04-07 | End: 2025-04-07 | Stop reason: HOSPADM

## 2025-04-07 RX ORDER — INSULIN GLARGINE 100 [IU]/ML
INJECTION, SOLUTION SUBCUTANEOUS
Qty: 15 ML | Refills: 1 | Status: SHIPPED | OUTPATIENT
Start: 2025-04-07 | End: 2025-04-12

## 2025-04-07 RX ORDER — FENTANYL CITRATE 50 UG/ML
INJECTION, SOLUTION INTRAMUSCULAR; INTRAVENOUS AS NEEDED
Status: DISCONTINUED | OUTPATIENT
Start: 2025-04-07 | End: 2025-04-07

## 2025-04-07 RX ORDER — EPHEDRINE SULFATE 50 MG/ML
INJECTION INTRAVENOUS AS NEEDED
Status: DISCONTINUED | OUTPATIENT
Start: 2025-04-07 | End: 2025-04-07

## 2025-04-07 RX ORDER — HYDROMORPHONE HCL/PF 1 MG/ML
0.5 SYRINGE (ML) INJECTION
Status: DISCONTINUED | OUTPATIENT
Start: 2025-04-07 | End: 2025-04-07 | Stop reason: HOSPADM

## 2025-04-07 RX ORDER — FENTANYL CITRATE/PF 50 MCG/ML
25 SYRINGE (ML) INJECTION
Status: DISCONTINUED | OUTPATIENT
Start: 2025-04-07 | End: 2025-04-07 | Stop reason: HOSPADM

## 2025-04-07 RX ORDER — MIDAZOLAM HYDROCHLORIDE 2 MG/2ML
INJECTION, SOLUTION INTRAMUSCULAR; INTRAVENOUS AS NEEDED
Status: DISCONTINUED | OUTPATIENT
Start: 2025-04-07 | End: 2025-04-07

## 2025-04-07 RX ORDER — ACETAMINOPHEN 325 MG/1
650 TABLET ORAL EVERY 4 HOURS PRN
Status: DISCONTINUED | OUTPATIENT
Start: 2025-04-07 | End: 2025-04-08 | Stop reason: HOSPADM

## 2025-04-07 RX ADMIN — PROPOFOL 200 MG: 10 INJECTION, EMULSION INTRAVENOUS at 08:42

## 2025-04-07 RX ADMIN — ROCURONIUM 35 MG: 50 INJECTION, SOLUTION INTRAVENOUS at 08:54

## 2025-04-07 RX ADMIN — LEVOFLOXACIN 750 MG: 5 INJECTION, SOLUTION INTRAVENOUS at 07:22

## 2025-04-07 RX ADMIN — Medication 10 ML: at 09:09

## 2025-04-07 RX ADMIN — IOHEXOL 30 ML: 350 INJECTION, SOLUTION INTRAVENOUS at 09:52

## 2025-04-07 RX ADMIN — SODIUM CHLORIDE 100 ML/HR: 0.9 INJECTION, SOLUTION INTRAVENOUS at 07:20

## 2025-04-07 RX ADMIN — PHENYLEPHRINE HYDROCHLORIDE 200 MCG: 50 INJECTION INTRAVENOUS at 08:59

## 2025-04-07 RX ADMIN — ROCURONIUM 5 MG: 50 INJECTION, SOLUTION INTRAVENOUS at 08:43

## 2025-04-07 RX ADMIN — MIDAZOLAM 2 MG: 1 INJECTION INTRAMUSCULAR; INTRAVENOUS at 08:38

## 2025-04-07 RX ADMIN — LIDOCAINE HYDROCHLORIDE 100 MG: 20 INJECTION, SOLUTION EPIDURAL; INFILTRATION; INTRACAUDAL at 08:42

## 2025-04-07 RX ADMIN — SUGAMMADEX 300 MG: 100 INJECTION, SOLUTION INTRAVENOUS at 09:47

## 2025-04-07 RX ADMIN — Medication 120 MG: at 08:43

## 2025-04-07 RX ADMIN — EPHEDRINE SULFATE 10 MG: 50 INJECTION INTRAVENOUS at 09:08

## 2025-04-07 RX ADMIN — PHENYLEPHRINE HYDROCHLORIDE 200 MCG: 50 INJECTION INTRAVENOUS at 08:56

## 2025-04-07 RX ADMIN — FENTANYL CITRATE 100 MCG: 50 INJECTION INTRAMUSCULAR; INTRAVENOUS at 08:42

## 2025-04-07 RX ADMIN — SODIUM CHLORIDE, SODIUM LACTATE, POTASSIUM CHLORIDE, AND CALCIUM CHLORIDE: .6; .31; .03; .02 INJECTION, SOLUTION INTRAVENOUS at 08:38

## 2025-04-07 NOTE — TELEPHONE ENCOUNTER
Please let him know that I have received a message from his anesthesiologist for the procedure as his blood sugars were high during the procedure and he required insulin.  His A1C is 10.4 which gives him an average glucose of 252 which is much higher than 8.8 in February-  From Shayna's last note it looks like the only medication he is taking right now is Jardiance.  For now he needs to be on insulin therapy.  I need him to start Lantus 20 units daily.  He has used Ozempic pen in the past and insulin pens are the same so he should be able to use pens.  I will send in a prescription of insulin pens and pen needles.  He should be checking his blood sugars before breakfast, before dinner and before bedtime and send over log in the next week

## 2025-04-07 NOTE — BRIEF OP NOTE (RAD/CATH)
INTERVENTIONAL RADIOLOGY PROCEDURE NOTE    Date: 4/7/2025    Procedure:   Procedure Summary       Date: 04/07/25 Room / Location: [facility] Interventional Radiology    Anesthesia Start: 0838 Anesthesia Stop: 0958    Procedure: IR NEPHROURETERAL ACCESS FOR UROLOGY PCNL Diagnosis:       Staghorn calculus      (pre-PCNL, lower pole and pelvic stone)    Scheduled Providers: Neisha Mcdowell DO Responsible Provider: Neisha Mcdowell DO    Anesthesia Type: general ASA Status: 3            Preoperative diagnosis:   1. Staghorn calculus         Postoperative diagnosis: Same.    Surgeon: Baldo Diaz MD     Assistant: None. No qualified resident was available.    Blood loss: Less than 10 mL    Specimens: None    Findings: Right PCNU placed using lower pole access.  Catheter was capped.  Scheduled for PCNL in 4 days.    Complications: None immediate.    Anesthesia: general

## 2025-04-07 NOTE — ANESTHESIA PREPROCEDURE EVALUATION
Procedure:  IR NEPHROURETERAL ACCESS FOR UROLOGY PCNL    Relevant Problems   ANESTHESIA (within normal limits)      CARDIO   (+) Essential hypertension      ENDO  A1c 10.9   (+) Type 2 diabetes mellitus with chronic kidney disease, without long-term current use of insulin ([facility])      /RENAL   (+) Chronic kidney disease, stage 3 ([facility])   (+) Staghorn calculus      HEMATOLOGY   (+) Anemia      MUSCULOSKELETAL   (+) Low back pain with sciatica      Orthopedic/Musculoskeletal   (+) Cervical radiculopathy      Other   (+) Class 2 severe obesity with serious comorbidity in adult ([facility])        Physical Exam    Airway    Mallampati score: II  TM Distance: >3 FB  Neck ROM: full     Dental    upper dentures and lower dentures    Cardiovascular  Rhythm: regular, Rate: normal, Cardiovascular exam normal    Pulmonary  Pulmonary exam normal Breath sounds clear to auscultation    Other Findings        Anesthesia Plan  ASA Score- 3     Anesthesia Type- general with ASA Monitors.         Additional Monitors:     Airway Plan: ETT.    Comment: Vs. Minimal sedation.       Plan Factors-Exercise tolerance (METS): >4 METS.    Chart reviewed. EKG reviewed.  Existing labs reviewed. Patient summary reviewed.    Patient is not a current smoker.      Obstructive sleep apnea risk education given perioperatively.        Induction- intravenous.    Postoperative Plan-         Informed Consent- Anesthetic plan and risks discussed with patient.        NPO Status:  Vitals Value Taken Time   Date of last liquid 04/06/25 04/07/25 0742   Time of last liquid 2355 04/07/25 0742   Date of last solid 04/06/25 04/07/25 0742   Time of last solid 1900 04/07/25 0742

## 2025-04-07 NOTE — ANESTHESIA POSTPROCEDURE EVALUATION
Post-Op Assessment Note    CV Status:  Stable    Pain management: adequate       Mental Status:  Arousable   Hydration Status:  Euvolemic   PONV Controlled:  Controlled   Airway Patency:  Patent     Post Op Vitals Reviewed: Yes    No anethesia notable event occurred.    Staff: CRNA           Last Filed PACU Vitals:  Vitals Value Taken Time   Temp     Pulse     BP     Resp     SpO2

## 2025-04-07 NOTE — ANESTHESIA POSTPROCEDURE EVALUATION
Post-Op Assessment Note    CV Status:  Stable  Pain Score: 0    Pain management: adequate       Mental Status:  Alert and awake   Hydration Status:  Euvolemic   PONV Controlled:  Controlled   Airway Patency:  Patent  Two or more mitigation strategies used for obstructive sleep apnea   Post Op Vitals Reviewed: Yes and Although blood glucose remains above 200, pt refused to receive any further insulin.  States he controls his sugar with diet and Jardiance.  I messaged with his endocrinologist who will have her office follow up with him for better control of his DM before he schedules for his next procedure    No anethesia notable event occurred.            Last Filed PACU Vitals:  Vitals Value Taken Time   Temp 97.2 °F (36.2 °C) 04/07/25 1025   Pulse 74 04/07/25 1030   /93 04/07/25 1027   Resp     SpO2 92 % 04/07/25 1030   Vitals shown include unfiled device data.    Modified Rudy:     Vitals Value Taken Time   Activity 2 04/07/25 1025   Respiration 2 04/07/25 1025   Circulation 2 04/07/25 1025   Consciousness 2 04/07/25 1025   Oxygen Saturation 2 04/07/25 1025     Modified Rudy Score: 10

## 2025-04-07 NOTE — DISCHARGE INSTRUCTIONS
Nephrostomy Tube Care     WHAT YOU NEED TO KNOW:   A nephrostomy tube is a catheter (thin plastic tube) that is inserted through your skin and into your kidney. The nephrostomy tube drains urine from your kidney into a collecting bag outside your body. You may need a nephrostomy tube when something is blocking the normal flow of urine. A nephrostomy tube may be used for a short or a long period of time. The nephrostomy tube comes out of your back, so you will need someone to help care for your nephrostomy tube.        DISCHARGE INSTRUCTIONS:      How to clean the skin around the nephrostomy tube and change the bandage:  Since the nephrostomy tube comes out of your back, you will not be able to care for it by yourself. Ask someone to follow the general directions below to check and care for your nephrostomy tube.   Gather the items you will need.          Disposable (single use) under-pad, and a clean washcloth  Plain soap, warm water, and new medical gloves  Sterile gauze bandages  Clear adhesive dressing or medical tape  Skin barrier  Protective skin film  Trash bag  Remove the old bandage, and check the tube entry site.    Have the patient lie on his side with the nephrostomy tube entry site facing up. Place the under-pad where it will catch drainage as you are working with the nephrostomy tube.   Wash your hands with soap and water. Put on new medical gloves.  Gently remove the old bandage, without pulling on the tube. Do this by holding the skin beside the tube with one hand. With the other hand, gently remove sticky tape and the skin barrier by pulling in the same direction as hair growth. Do not touch the side of the bandage that is placed over or around the tube. Throw the bandage and skin barrier away in a trash bag.  Look for signs of infection, such as skin redness and swelling. Report any skin changes to healthcare providers.  Clean the tube entry site.    Hold the tube in place to keep it from being  pulled out while you are cleaning around it.  You will need to clean the area twice. For the first cleaning, wet a new gauze bandage with soap and water.  Begin at the entry site of the tube. Wipe the skin in circles, moving away from the entry site. Remove blood and any other material with the gauze. Do this as often as needed. Use a new gauze bandage each time you clean the area, moving away from the entry site.   For the second cleaning, wet a new gauze bandage with water. Begin at the entry site of the tube. Wipe the skin in circles, moving away from the entry site. Use a new gauze bandage each time you clean the area, moving away from the entry site.   Gently pat the skin with a clean washcloth to dry it.    Apply the skin barrier and bandages.    Roll up a bandage to make it thick, and place it under  the place where the tube enters the skin. Place it to support the tube, and stop it from kinking or bending. Tape the bandage in place, and apply more bandages if directed by a healthcare provider.   Bring the tubing forward to the front and tape it to the skin. Do not stretch the tube tight, because this may pull the nephrostomy tube out.  How often to change the bandage.  Change the bandage around the tube, every other day. If your bandages  get dirty or wet, change them right away, and as often as needed. If your nephrostomy tube is to be used for a long period of time, the tube needs to be changed every 2 to 3 months. Healthcare providers will tell you when you need to make an appointment to have your tube changed.       How to prevent problems with your nephrostomy tube:   Change bandages, directed.  This helps to prevent infection. Throw away or clean your drainage bag as directed by your healthcare provider.    Wipe the connecting ends of the drainage bag with alcohol before you reconnect the bag to the tube.  This helps prevent infection.     Keep the tube taped to your skin and connected to a drainage  bag placed below the level of your kidneys.  This helps prevent urine from backing up into your kidneys. You may wear a small drainage bag strapped to your leg to let you move around more easily.    Check the catheter to be sure it is in place after you change your clothes or do other activities.  Do not wear tight clothing over the tube. Place the tubing over your thigh rather than under it when you are sitting down. Be sure that nothing is pulling on the nephrostomy tube when you move around.    Change positions if you see little or no urine in your drainage bag.  Check to see if the urine tube is twisted or bent. Be sure that you are not sitting or lying on the tube. If there are no kinks and there is little or no urine in the drainage bag, tell your healthcare provider.    Keep the site covered while you shower.  Tape a piece of clear adhesive plastic over the dressing to keep it dry while you shower. Do not take tub baths.    Contact Interventional Radiology at [phone] ([facility] PATIENTS: Contact Interventional Radiology at [phone]) ([facility] PATIENTS: Contact Interventional Radiology at [phone]) if:  The skin around the nephrostomy tube is red, swollen, itches, or has a rash.   You have a fever greater than 101 or chills.  You have lower back or hip pain.  There are changes in how your urine looks or smells.  You have little or no urine draining from the nephrostomy tube.   You have nausea and are vomiting.  The black son on your tube has moved, or the tube is longer than when it was put in.   You have questions or concerns about your condition or care.  The nephrostomy tube comes out completely.   There is blood, pus, or a bad smell coming from the place where the tube enters your skin.  Urine is leaking around the tube.        The following pharmacies carry the flush syringes.       [facility]                     [facility] [address]  [address]                     [address]                    [phone]  [place]                       [place]  Phone [phone]            Phone [phone]                 [facility]                                                                                                   [phone]  [facility]             [facility]                             [address] [place] [address]   [place]                                 [phone]  Phone [phone]            Phone [phone]    [facility]                                                                         [facility] [phone]  [address] [place] [address] [place].  [place]   Phone [phone]

## 2025-04-08 LAB — BACTERIA UR CULT: NORMAL

## 2025-04-09 ENCOUNTER — OFFICE VISIT (OUTPATIENT)
Dept: OBGYN CLINIC | Facility: MEDICAL CENTER | Age: 60
End: 2025-04-09

## 2025-04-09 VITALS — BODY MASS INDEX: 38.6 KG/M2 | WEIGHT: 285 LBS | HEIGHT: 72 IN

## 2025-04-09 DIAGNOSIS — M77.8 RIGHT WRIST TENDONITIS: Primary | ICD-10-CM

## 2025-04-09 DIAGNOSIS — M25.641 FINGER STIFFNESS, RIGHT: ICD-10-CM

## 2025-04-09 PROCEDURE — 99024 POSTOP FOLLOW-UP VISIT: CPT | Performed by: ORTHOPAEDIC SURGERY

## 2025-04-09 NOTE — PROGRESS NOTES
"HAND & UPPER EXTREMITY OFFICE VISIT   Referred By:  No referring provider defined for this encounter.      Chief Complaint:     Right wrist pain    Surgery:  Surgery Date: 3/24/2025 - Synovectomy / Debridement - Flexor Carpi Radialis And Flexor Carpi Ulnaris Tendons- Right - Right     History of Present Illness:   Patient presents now 16 days status post the above surgery. he reports the wrist is feeling better since the surgery. His main complaint today is pain in the middle and ring fingers. He feels like the fingers are \"always being squeezed\". He denies any numbness or tingling in the fingers.     Past Medical History:  Past Medical History:   Diagnosis Date    Arthritis 2012    Cervical disc disease     Chronic kidney disease 2012    Chronic pain disorder     Diabetes mellitus (HCC)     Full dentures     only using upper    High blood sugar     Hypertension     Kidney stone     Liver disease     Lumbar disc disease     Migraines     Neuropathy in diabetes (HCC)     RLS (restless legs syndrome)     Skin cancer     in past     Past Surgical History:   Procedure Laterality Date    AMPUTATION  2022    Little toe left    CATARACT EXTRACTION Bilateral     COLONOSCOPY      CONTRACTURE Right 01/20/2025    Procedure: Right wrist and hand extensor tenolysis and wrist capsulotomy;  Surgeon: Carroll Mccarthy MD;  [facility];  Service: Orthopedics    FOOT SURGERY  2022    Left Foot    GANGLION CYST EXCISION Left 03/25/2024    Procedure: EXCISION MUCOID CYST, INDEX FINGER DIP;  Surgeon: Carroll Mccarthy MD;  [facility];  Service: Orthopedics    GANGLION CYST EXCISION Right 05/20/2024    Procedure: EXCISION MUCOID CYST - RIGHT INDEX AND MIDDLE FINGERS;  Surgeon: Carroll Mccarthy MD;  [facility];  Service: Orthopedics    HERNIA REPAIR      INCISION AND DRAINAGE  01/16/2024    IR NEPHROURETERAL ACCESS FOR UROLOGY PCNL  4/7/2025    KIDNEY SURGERY Right 06/2012    KNEE SURGERY Right " 1980    MOUTH SURGERY      SD AMPUTATION METATARSAL W/TOE SINGLE Left 6/1/2022    Procedure: RAY RESECTION FOOT;  Surgeon: Hannah Melgoza DPM;  [facility];  Service: Podiatry    SD INCISION & DRAINAGE ABSCESS COMPLICATED/MULTIPLE Right 09/17/2024    Procedure: Irrigation and debridement right wrist and hand, any indicated procedures;  Surgeon: Carroll Mccarthy MD;  [facility];  Service: Orthopedics    SD INCISION EXTENSOR TENDON SHEATH WRIST Right 01/20/2025    Procedure: RELEASE DEQUERVAINS - Right;  Surgeon: Carroll Mccarthy MD;  [facility];  Service: Orthopedics    SD RPR AA HERNIA 1ST < 3 CM REDUCIBLE N/A 7/14/2023    Procedure: REPAIR HERNIA INCISIONAL;  Surgeon: Matt Melo MD;  [facility];  Service: General    SD SYNOVECTOMY EXTENSOR TENDON SHTH WRIST 1 CMPRT Right 10/03/2024    Procedure: Irrigation and debridement, right wrist, volar and dorsal, possible extensor and flexor tenosynovectomy, any indicated procedures;  Surgeon: Carroll Mccarthy MD;  [facility];  Service: Orthopedics    SD SYNOVECTOMY EXTENSOR TENDON SHTH WRIST 1 CMPRT Right 03/24/2025    Procedure: SYNOVECTOMY / DEBRIDEMENT - FLEXOR CARPI RADIALIS AND FLEXOR CARPI ULNARIS TENDONS- RIGHT;  Surgeon: Carroll Mccarthy MD;  [facility];  Service: Orthopedics    TONSILLECTOMY  1968    Yes    WOUND DEBRIDEMENT Left 4/28/2022    Procedure: Left foot washout;  Surgeon: Hannah Melgoza DPM;  [facility];  Service: Podiatry    WOUND DEBRIDEMENT Left 6/6/2022    Procedure: DEBRIDEMENT WOUND (WASH OUT);  Surgeon: Hannah Melgoza DPM;  [facility];  Service: Podiatry     Family History   Problem Relation Age of Onset    Diabetes Paternal Grandmother     Diabetes Paternal Grandfather     Arthritis Maternal Grandmother      Social History     Socioeconomic History    Marital status: Registered Domestic Partner     Spouse name: Not on file    Number  of children: Not on file    Years of education: Not on file    Highest education level: Not on file   Occupational History    Not on file   Tobacco Use    Smoking status: Never    Smokeless tobacco: Never   Vaping Use    Vaping status: Never Used   Substance and Sexual Activity    Alcohol use: Yes     Alcohol/week: 1.0 standard drink of alcohol     Types: 1 Cans of beer per week     Comment: ocassional    Drug use: Never    Sexual activity: Not Currently     Partners: Female     Birth control/protection: Abstinence   Other Topics Concern    Not on file   Social History Narrative    Not on file     Social Drivers of Health     Financial Resource Strain: Not on file   Food Insecurity: Patient Declined (11/13/2024)    Hunger Vital Sign     Worried About Running Out of Food in the Last Year: Patient declined     Ran Out of Food in the Last Year: Patient declined   Transportation Needs: Patient Declined (11/13/2024)    PRAPARE - Transportation     Lack of Transportation (Medical): Patient declined     Lack of Transportation (Non-Medical): Patient declined   Physical Activity: Not on file   Stress: Not on file   Social Connections: Not on file   Intimate Partner Violence: Not on file   Housing Stability: Patient Declined (11/13/2024)    Housing Stability Vital Sign     Unable to Pay for Housing in the Last Year: Patient declined     Number of Times Moved in the Last Year: 0     Homeless in the Last Year: Patient declined     Scheduled Meds:  Continuous Infusions:No current facility-administered medications for this visit.    PRN Meds:.  Allergies   Allergen Reactions    Cephalexin Hives     Skin peeling  Tolerates penicillins (tolerated unasyn and zosyn)    Metformin GI Intolerance    Pollen Extract Sneezing       Physical Examination:    Ht 6' (1.829 m)   Wt 129 kg (285 lb)   BMI 38.65 kg/m²     Gen: A&Ox3, NAD    Right Upper Extremity:  Radial incisions healing well without signs of infection   Ulnar incision healing  "with some mild surrounding erythema.   Sutures intact, removed. There was a very small amount of purulence after removing the distal suture from the ulnar incision site. No further purulence was able to be expressed.   +extrinsic tightness on Bunnel test  Sensation intact to light touch in the axillary median, ulnar, and radial nerve distributions  Able to form about 80% composite fist  Warm, well-perfused digits  Cap refill <2s      Studies:  No new imaging to review    Labs:  Lab Results   Component Value Date    HGBA1C 10.4 (H) 04/06/2025    HGBA1C 8.8 (H) 02/21/2025         Assessment & Plan  Right wrist tendonitis  60 y.o. male presents 16 days status post Synovectomy / Debridement - Flexor Carpi Radialis And Flexor Carpi Ulnaris Tendons- Right - Right.    He reports the previous severe pain he was experiencing in the volar wrist is improved. He does have some mild erythema and a very small amount of purulence with one of the suture removal at the ulnar incision. We will continue with very close observation of his symptoms for now. Recommend keeping the area clean and covered until the suture holes are completely closed. he should monitor for any signs of infection including increased pain, redness, swelling, drainage, fevers/chills and contact the office with any concerns.        Finger stiffness, right  He reports continued stiffness in the fingers and pain in the middle and ring fingers which feels like \"constant squeezing\". On exam most of his stiffness is attributable to extrinsic tightness. As previously discussed, I cannot guarantee that a repeat tenolysis would give him much more motion compared to his previous procedures. Recommend continued OT and HEP as tolerated to maximize his ROM.         It is recommended he return to the office in 4 weeks, or sooner should symptoms worsen.    he expressed understanding of the plan and agreed. We encouraged them to contact our office with any questions or " concerns.         Carroll Mccarthy MD  Hand and Upper Extremity Surgery          *This note was dictated using Dragon voice recognition software. Please excuse any word substitutions or errors.*      Scribe Attestation      I,:  Yesenia Carrasquillo PA-C am acting as a scribe while in the presence of the attending physician.:       I,:  Carroll Mccarthy MD personally performed the services described in this documentation    as scribed in my presence.:

## 2025-04-09 NOTE — ASSESSMENT & PLAN NOTE
60 y.o. male presents 16 days status post Synovectomy / Debridement - Flexor Carpi Radialis And Flexor Carpi Ulnaris Tendons- Right - Right.    He reports the previous severe pain he was experiencing in the volar wrist is improved. He does have some mild erythema and a very small amount of purulence with one of the suture removal at the ulnar incision. We will continue with very close observation of his symptoms for now. Recommend keeping the area clean and covered until the suture holes are completely closed. he should monitor for any signs of infection including increased pain, redness, swelling, drainage, fevers/chills and contact the office with any concerns.

## 2025-04-09 NOTE — PRE-PROCEDURE INSTRUCTIONS
Pre-Surgery Instructions:   Medication Instructions    amitriptyline (ELAVIL) 10 mg tablet Take night before surgery    ammonium lactate (LAC-HYDRIN) 12 % lotion Hold day of surgery.    BENFOTIAMINE PO Hold day of surgery.    carvedilol (COREG) 25 mg tablet Take day of surgery.    Empagliflozin (Jardiance) 25 MG TABS Stop taking 4 days prior to surgery.    lidocaine (Lidoderm) 5 % Hold day of surgery.    lisinopril-hydrochlorothiazide (PRINZIDE,ZESTORETIC) 20-12.5 MG per tablet Hold day of surgery.    methocarbamol (ROBAXIN) 750 mg tablet Uses PRN- OK to take day of surgery    ondansetron (ZOFRAN) 4 mg tablet Uses PRN- OK to take day of surgery    traMADol (Ultram) 50 mg tablet Uses PRN- OK to take day of surgery    Ubrogepant (UBRELVY) 100 MG tablet Uses PRN- OK to take day of surgery    Medication instructions for day of procedure reviewed. Please use only a sip of water to take your instructed morning medications (if any).      You will receive a call one business day prior to procedure with an arrival time and hospital directions. If procedure is scheduled on a Monday, the hospital will be calling you on the Friday prior to your procedure. If you have not heard from anyone by 8pm, please call the hospital supervisor through the hospital  at [phone]. (Maxime [phone]).     Please do not eat or drink after 11 pm the night before the MRI. Please take your medications with a sip of water at least 2 hours prior to their arrival time.     Please shower either the night prior to the procedure or the morning of the procedure. Dress in clean, comfortable clothes. All patients will be required to change into a hospital gown. Street clothes are not permitted in the MRI. Magnetic nail polish must be removed prior to the arrival.      Keep any valuables, jewelry, piercings at home.     Arrive 15 minutes prior to your given arrival time in order to register. Please bring any prior CT or MRI studies of the  same area that were not performed at a [facility] along.      Arrange for a responsible person to drive patient to and from the hospital on the day of the procedure. Visitor Guidelines discussed.     Call the prescribing physician's office with any new illnesses, exposures, or additional questions prior to procedure.

## 2025-04-09 NOTE — TELEPHONE ENCOUNTER
Patient advised he saw the insulin order. He stated he did not start it yet. I advised him to begin as soon as he can and to check blood sugars as well as to send over sugar log.

## 2025-04-10 ENCOUNTER — OFFICE VISIT (OUTPATIENT)
Dept: OCCUPATIONAL THERAPY | Age: 60
End: 2025-04-10
Payer: MEDICARE

## 2025-04-10 DIAGNOSIS — L08.9 ANIMAL BITE OF LEFT HAND WITH INFECTION, SUBSEQUENT ENCOUNTER: ICD-10-CM

## 2025-04-10 DIAGNOSIS — M25.641 STIFFNESS OF FINGER JOINT OF RIGHT HAND: ICD-10-CM

## 2025-04-10 DIAGNOSIS — Z47.89 AFTERCARE FOLLOWING SURGERY OF THE MUSCULOSKELETAL SYSTEM: ICD-10-CM

## 2025-04-10 DIAGNOSIS — M65.949 TENOSYNOVITIS OF FINGER AND HAND: Primary | ICD-10-CM

## 2025-04-10 DIAGNOSIS — S61.452D ANIMAL BITE OF LEFT HAND WITH INFECTION, SUBSEQUENT ENCOUNTER: ICD-10-CM

## 2025-04-10 DIAGNOSIS — M25.631 WRIST STIFFNESS, RIGHT: ICD-10-CM

## 2025-04-10 DIAGNOSIS — M77.8 RIGHT WRIST TENDONITIS: ICD-10-CM

## 2025-04-10 PROCEDURE — 97140 MANUAL THERAPY 1/> REGIONS: CPT

## 2025-04-10 PROCEDURE — 97110 THERAPEUTIC EXERCISES: CPT

## 2025-04-10 NOTE — PROGRESS NOTES
"Daily Note     Today's date: 4/10/2025  Patient name: Yoni Castillo  : 1965  MRN: [id number]  Referring provider: Yesenia Carrasquillo PA-C  Dx:   Encounter Diagnosis     ICD-10-CM    1. Tenosynovitis of finger and hand  M65.949       2. Animal bite of left hand with infection, subsequent encounter  S61.452D     L08.9       3. Wrist stiffness, right  M25.631       4. Stiffness of finger joint of right hand  M25.641       5. Aftercare following surgery of the musculoskeletal system  Z47.89       6. Right wrist tendonitis  M77.8           Start Time: 1400  Stop Time: 1443  Total time in clinic (min): 43 minutes    Subjective: \"It's still leaking.\"      Objective: See treatment diary below    Assessment: Tolerated treatment well. Pt continues to report some drainage at his ulnar incision site. PROM continues to improve. Nearly able to make a full composite fist with max effort from therapist. Patient would benefit from continued OT      Plan: Continue per plan of care.  Progress treatment as tolerated.       Precautions: 3/24 FCR and FCU debridement and tenosynovectomy     Manuals  3/27  3/31  4/3  4/10           Edema mob    10'  10'  10'          Scar mob              STM    5'  5'  5'                                                Ther Ex                       Forearm AROM 2x20  2x20  2x20  2x20          Wrist P/AROM 2x20 2x20 2x20 2x20          Tendon glides 2x20 2x20 2x20 2x20          Thumb P/AROM X20 all planes X20 all planes X20 all planes X20 all planes                        Strengthening   Puttycise \"L\" tool 10' Puttycise \"L\" tool 10'                                                                                                                Ther Activity                   Grasp/release                                                                                                                     Neuro Re-Ed                                                               Ortho Fit                      "                                          Modalities                  HP    5' 5'  5'

## 2025-04-10 NOTE — ANESTHESIA PREPROCEDURE EVALUATION
Procedure:  NEPHROLITHOTOMY  PERCUTANEOUS (PCNL) (Right: Flank)    Relevant Problems   CARDIO   (+) Essential hypertension      ENDO   (+) Type 2 diabetes mellitus with chronic kidney disease, without long-term current use of insulin ([facility]) (A1c >10)      /RENAL   (+) Chronic kidney disease, stage 3 ([facility])   (+) Staghorn calculus      HEMATOLOGY   (+) Anemia      MUSCULOSKELETAL   (+) Low back pain with sciatica      Neurology/Sleep   (+) Neuropathy      Rheumatology   (+) Dupuytren's disease of finger with nodules without contracture      Surgery/Wound/Pain   (+) Incisional hernia without obstruction or gangrene      Orthopedic/Musculoskeletal   (+) Acquired absence of other left toe(s) (HCC)      Other   (+) Class 2 severe obesity with serious comorbidity in adult ([facility])        Physical Exam    Airway    Mallampati score: III         Dental   No notable dental hx     Cardiovascular  Cardiovascular exam normal    Pulmonary  Pulmonary exam normal     Other Findings        Anesthesia Plan  ASA Score- 3     Anesthesia Type-     Plan Factors-    Induction-     Postoperative Plan-     Perioperative Resuscitation Plan - Level 1 - Full Code.       Informed Consent-       NPO Status:  No vitals data found for the desired time range.

## 2025-04-11 ENCOUNTER — APPOINTMENT (OUTPATIENT)
Dept: RADIOLOGY | Facility: HOSPITAL | Age: 60
End: 2025-04-11
Payer: MEDICARE

## 2025-04-11 ENCOUNTER — HOSPITAL ENCOUNTER (OUTPATIENT)
Facility: HOSPITAL | Age: 60
Setting detail: OUTPATIENT SURGERY
Discharge: HOME | End: 2025-04-12
Payer: MEDICARE

## 2025-04-11 DIAGNOSIS — N20.0 RIGHT RENAL STONE: Primary | ICD-10-CM

## 2025-04-11 LAB
ABO GROUP BLD: NORMAL
BLD GP AB SCN SERPL QL: NEGATIVE
GLUCOSE SERPL-MCNC: 217 MG/DL (ref 65–140)
GLUCOSE SERPL-MCNC: 219 MG/DL (ref 65–140)
RH BLD: POSITIVE
SPECIMEN EXPIRATION DATE: NORMAL

## 2025-04-11 PROCEDURE — C1769 GUIDE WIRE: HCPCS

## 2025-04-11 PROCEDURE — 86900 BLOOD TYPING SEROLOGIC ABO: CPT

## 2025-04-11 PROCEDURE — 82360 CALCULUS ASSAY QUANT: CPT

## 2025-04-11 PROCEDURE — 86901 BLOOD TYPING SEROLOGIC RH(D): CPT

## 2025-04-11 PROCEDURE — 82948 REAGENT STRIP/BLOOD GLUCOSE: CPT

## 2025-04-11 PROCEDURE — C2625 STENT, NON-COR, TEM W/DEL SY: HCPCS

## 2025-04-11 PROCEDURE — 76000 FLUOROSCOPY <1 HR PHYS/QHP: CPT

## 2025-04-11 PROCEDURE — C1726 CATH, BAL DIL, NON-VASCULAR: HCPCS

## 2025-04-11 PROCEDURE — 50081 PERQ NL/PL LITHOTRP CPLX>2CM: CPT

## 2025-04-11 PROCEDURE — 86850 RBC ANTIBODY SCREEN: CPT

## 2025-04-11 PROCEDURE — C1758 CATHETER, URETERAL: HCPCS

## 2025-04-11 RX ORDER — SODIUM CHLORIDE, SODIUM LACTATE, POTASSIUM CHLORIDE, CALCIUM CHLORIDE 600; 310; 30; 20 MG/100ML; MG/100ML; MG/100ML; MG/100ML
125 INJECTION, SOLUTION INTRAVENOUS CONTINUOUS
Status: DISCONTINUED | OUTPATIENT
Start: 2025-04-11 | End: 2025-04-12 | Stop reason: HOSPADM

## 2025-04-11 RX ORDER — OXYCODONE HYDROCHLORIDE 5 MG/1
5 TABLET ORAL EVERY 4 HOURS PRN
Status: DISCONTINUED | OUTPATIENT
Start: 2025-04-11 | End: 2025-04-11 | Stop reason: ALTCHOICE

## 2025-04-11 RX ORDER — ONDANSETRON 2 MG/ML
4 INJECTION INTRAMUSCULAR; INTRAVENOUS ONCE AS NEEDED
Status: DISCONTINUED | OUTPATIENT
Start: 2025-04-11 | End: 2025-04-11

## 2025-04-11 RX ORDER — GABAPENTIN 100 MG/1
100 CAPSULE ORAL 3 TIMES DAILY
Status: DISCONTINUED | OUTPATIENT
Start: 2025-04-11 | End: 2025-04-12 | Stop reason: HOSPADM

## 2025-04-11 RX ORDER — CALCIUM CARBONATE 500 MG/1
1000 TABLET, CHEWABLE ORAL DAILY PRN
Status: DISCONTINUED | OUTPATIENT
Start: 2025-04-11 | End: 2025-04-12 | Stop reason: HOSPADM

## 2025-04-11 RX ORDER — HYDROCHLOROTHIAZIDE 12.5 MG/1
12.5 TABLET ORAL 2 TIMES DAILY
Status: DISCONTINUED | OUTPATIENT
Start: 2025-04-12 | End: 2025-04-12 | Stop reason: HOSPADM

## 2025-04-11 RX ORDER — PHENYLEPHRINE HCL IN 0.9% NACL 1 MG/10 ML
SYRINGE (ML) INTRAVENOUS AS NEEDED
Status: DISCONTINUED | OUTPATIENT
Start: 2025-04-11 | End: 2025-04-11

## 2025-04-11 RX ORDER — LISINOPRIL 20 MG/1
20 TABLET ORAL 2 TIMES DAILY
Status: DISCONTINUED | OUTPATIENT
Start: 2025-04-12 | End: 2025-04-12 | Stop reason: HOSPADM

## 2025-04-11 RX ORDER — PROMETHAZINE HYDROCHLORIDE 25 MG/ML
6.25 INJECTION, SOLUTION INTRAMUSCULAR; INTRAVENOUS ONCE AS NEEDED
Status: DISCONTINUED | OUTPATIENT
Start: 2025-04-11 | End: 2025-04-11

## 2025-04-11 RX ORDER — FENTANYL CITRATE 50 UG/ML
INJECTION, SOLUTION INTRAMUSCULAR; INTRAVENOUS AS NEEDED
Status: DISCONTINUED | OUTPATIENT
Start: 2025-04-11 | End: 2025-04-11

## 2025-04-11 RX ORDER — CARVEDILOL 25 MG/1
25 TABLET ORAL 2 TIMES DAILY WITH MEALS
Status: DISCONTINUED | OUTPATIENT
Start: 2025-04-11 | End: 2025-04-12 | Stop reason: HOSPADM

## 2025-04-11 RX ORDER — HYDROMORPHONE HCL/PF 1 MG/ML
0.5 SYRINGE (ML) INJECTION
Status: DISCONTINUED | OUTPATIENT
Start: 2025-04-11 | End: 2025-04-11

## 2025-04-11 RX ORDER — ROCURONIUM BROMIDE 10 MG/ML
INJECTION, SOLUTION INTRAVENOUS AS NEEDED
Status: DISCONTINUED | OUTPATIENT
Start: 2025-04-11 | End: 2025-04-11

## 2025-04-11 RX ORDER — SENNOSIDES 8.6 MG
1 TABLET ORAL DAILY
Status: DISCONTINUED | OUTPATIENT
Start: 2025-04-11 | End: 2025-04-12 | Stop reason: HOSPADM

## 2025-04-11 RX ORDER — SODIUM CHLORIDE, SODIUM LACTATE, POTASSIUM CHLORIDE, CALCIUM CHLORIDE 600; 310; 30; 20 MG/100ML; MG/100ML; MG/100ML; MG/100ML
75 INJECTION, SOLUTION INTRAVENOUS CONTINUOUS
Status: DISCONTINUED | OUTPATIENT
Start: 2025-04-11 | End: 2025-04-12 | Stop reason: HOSPADM

## 2025-04-11 RX ORDER — ACETAMINOPHEN 325 MG/1
975 TABLET ORAL EVERY 6 HOURS SCHEDULED
Status: DISCONTINUED | OUTPATIENT
Start: 2025-04-11 | End: 2025-04-12 | Stop reason: HOSPADM

## 2025-04-11 RX ORDER — PROPOFOL 10 MG/ML
INJECTION, EMULSION INTRAVENOUS AS NEEDED
Status: DISCONTINUED | OUTPATIENT
Start: 2025-04-11 | End: 2025-04-11

## 2025-04-11 RX ORDER — LIDOCAINE HYDROCHLORIDE 20 MG/ML
INJECTION, SOLUTION EPIDURAL; INFILTRATION; INTRACAUDAL; PERINEURAL AS NEEDED
Status: DISCONTINUED | OUTPATIENT
Start: 2025-04-11 | End: 2025-04-11

## 2025-04-11 RX ORDER — ONDANSETRON 2 MG/ML
4 INJECTION INTRAMUSCULAR; INTRAVENOUS EVERY 6 HOURS PRN
Status: DISCONTINUED | OUTPATIENT
Start: 2025-04-11 | End: 2025-04-12 | Stop reason: HOSPADM

## 2025-04-11 RX ORDER — CIPROFLOXACIN 2 MG/ML
400 INJECTION, SOLUTION INTRAVENOUS ONCE
Status: DISCONTINUED | OUTPATIENT
Start: 2025-04-11 | End: 2025-04-11

## 2025-04-11 RX ORDER — MAGNESIUM HYDROXIDE 1200 MG/15ML
LIQUID ORAL AS NEEDED
Status: DISCONTINUED | OUTPATIENT
Start: 2025-04-11 | End: 2025-04-11 | Stop reason: HOSPADM

## 2025-04-11 RX ORDER — MIDAZOLAM HYDROCHLORIDE 2 MG/2ML
INJECTION, SOLUTION INTRAMUSCULAR; INTRAVENOUS AS NEEDED
Status: DISCONTINUED | OUTPATIENT
Start: 2025-04-11 | End: 2025-04-11

## 2025-04-11 RX ORDER — TRAMADOL HYDROCHLORIDE 50 MG/1
100 TABLET ORAL
Status: DISCONTINUED | OUTPATIENT
Start: 2025-04-11 | End: 2025-04-12 | Stop reason: HOSPADM

## 2025-04-11 RX ORDER — AMITRIPTYLINE HYDROCHLORIDE 10 MG/1
10 TABLET ORAL
Status: DISCONTINUED | OUTPATIENT
Start: 2025-04-11 | End: 2025-04-12 | Stop reason: HOSPADM

## 2025-04-11 RX ORDER — HYDROMORPHONE HCL/PF 1 MG/ML
SYRINGE (ML) INJECTION AS NEEDED
Status: DISCONTINUED | OUTPATIENT
Start: 2025-04-11 | End: 2025-04-11

## 2025-04-11 RX ORDER — FENTANYL CITRATE/PF 50 MCG/ML
50 SYRINGE (ML) INJECTION
Status: DISCONTINUED | OUTPATIENT
Start: 2025-04-11 | End: 2025-04-11

## 2025-04-11 RX ORDER — BUPIVACAINE HYDROCHLORIDE 5 MG/ML
INJECTION, SOLUTION EPIDURAL; INTRACAUDAL; PERINEURAL AS NEEDED
Status: DISCONTINUED | OUTPATIENT
Start: 2025-04-11 | End: 2025-04-11 | Stop reason: HOSPADM

## 2025-04-11 RX ORDER — FENTANYL CITRATE/PF 50 MCG/ML
25 SYRINGE (ML) INJECTION
Status: DISCONTINUED | OUTPATIENT
Start: 2025-04-11 | End: 2025-04-11

## 2025-04-11 RX ORDER — ONDANSETRON 2 MG/ML
INJECTION INTRAMUSCULAR; INTRAVENOUS AS NEEDED
Status: DISCONTINUED | OUTPATIENT
Start: 2025-04-11 | End: 2025-04-11

## 2025-04-11 RX ORDER — KETAMINE HCL IN NACL, ISO-OSM 100MG/10ML
SYRINGE (ML) INJECTION AS NEEDED
Status: DISCONTINUED | OUTPATIENT
Start: 2025-04-11 | End: 2025-04-11

## 2025-04-11 RX ADMIN — GENTAMICIN SULFATE 492 MG: 40 INJECTION, SOLUTION INTRAMUSCULAR; INTRAVENOUS at 08:08

## 2025-04-11 RX ADMIN — ACETAMINOPHEN 975 MG: 325 TABLET, FILM COATED ORAL at 14:04

## 2025-04-11 RX ADMIN — SUGAMMADEX 400 MG: 100 INJECTION, SOLUTION INTRAVENOUS at 10:41

## 2025-04-11 RX ADMIN — ROCURONIUM 40 MG: 50 INJECTION, SOLUTION INTRAVENOUS at 08:17

## 2025-04-11 RX ADMIN — ONDANSETRON 4 MG: 2 INJECTION INTRAMUSCULAR; INTRAVENOUS at 10:26

## 2025-04-11 RX ADMIN — TRAMADOL HYDROCHLORIDE 100 MG: 50 TABLET, COATED ORAL at 22:47

## 2025-04-11 RX ADMIN — FENTANYL CITRATE 50 MCG: 50 INJECTION INTRAMUSCULAR; INTRAVENOUS at 12:15

## 2025-04-11 RX ADMIN — FENTANYL CITRATE 50 MCG: 50 INJECTION INTRAMUSCULAR; INTRAVENOUS at 07:41

## 2025-04-11 RX ADMIN — ROCURONIUM 10 MG: 50 INJECTION, SOLUTION INTRAVENOUS at 09:53

## 2025-04-11 RX ADMIN — CARVEDILOL 25 MG: 25 TABLET, FILM COATED ORAL at 17:23

## 2025-04-11 RX ADMIN — ROCURONIUM 60 MG: 50 INJECTION, SOLUTION INTRAVENOUS at 07:42

## 2025-04-11 RX ADMIN — FENTANYL CITRATE 50 MCG: 50 INJECTION INTRAMUSCULAR; INTRAVENOUS at 08:27

## 2025-04-11 RX ADMIN — SODIUM CHLORIDE, SODIUM LACTATE, POTASSIUM CHLORIDE, AND CALCIUM CHLORIDE: .6; .31; .03; .02 INJECTION, SOLUTION INTRAVENOUS at 09:43

## 2025-04-11 RX ADMIN — HYDROMORPHONE HYDROCHLORIDE 0.5 MG: 1 INJECTION, SOLUTION INTRAMUSCULAR; INTRAVENOUS; SUBCUTANEOUS at 10:04

## 2025-04-11 RX ADMIN — PROPOFOL 160 MG: 10 INJECTION, EMULSION INTRAVENOUS at 07:41

## 2025-04-11 RX ADMIN — FENTANYL CITRATE 50 MCG: 50 INJECTION INTRAMUSCULAR; INTRAVENOUS at 11:30

## 2025-04-11 RX ADMIN — PROPOFOL 50 MCG/KG/MIN: 10 INJECTION, EMULSION INTRAVENOUS at 07:44

## 2025-04-11 RX ADMIN — SODIUM CHLORIDE, SODIUM LACTATE, POTASSIUM CHLORIDE, AND CALCIUM CHLORIDE 75 ML/HR: .6; .31; .03; .02 INJECTION, SOLUTION INTRAVENOUS at 12:58

## 2025-04-11 RX ADMIN — Medication 20 MG: at 08:42

## 2025-04-11 RX ADMIN — AMITRIPTYLINE HYDROCHLORIDE 10 MG: 10 TABLET, FILM COATED ORAL at 22:45

## 2025-04-11 RX ADMIN — MIDAZOLAM HYDROCHLORIDE 2 MG: 1 INJECTION, SOLUTION INTRAMUSCULAR; INTRAVENOUS at 07:33

## 2025-04-11 RX ADMIN — ROCURONIUM 20 MG: 50 INJECTION, SOLUTION INTRAVENOUS at 09:10

## 2025-04-11 RX ADMIN — LIDOCAINE HYDROCHLORIDE 70 MG: 20 INJECTION, SOLUTION EPIDURAL; INFILTRATION; INTRACAUDAL at 07:41

## 2025-04-11 RX ADMIN — SODIUM CHLORIDE, SODIUM LACTATE, POTASSIUM CHLORIDE, AND CALCIUM CHLORIDE 125 ML/HR: .6; .31; .03; .02 INJECTION, SOLUTION INTRAVENOUS at 06:21

## 2025-04-11 RX ADMIN — VANCOMYCIN HYDROCHLORIDE 2000 MG: 1 INJECTION, POWDER, LYOPHILIZED, FOR SOLUTION INTRAVENOUS at 06:23

## 2025-04-11 RX ADMIN — Medication 20 MG: at 09:41

## 2025-04-11 RX ADMIN — ACETAMINOPHEN 975 MG: 325 TABLET, FILM COATED ORAL at 20:33

## 2025-04-11 RX ADMIN — Medication 100 MCG: at 08:05

## 2025-04-11 RX ADMIN — Medication 20 MG: at 07:41

## 2025-04-11 NOTE — OP NOTE
Operative Note     PATIENT:  Yoni Castillo (MRN [id number])    DATE OF PROCEDURE:   4/11/2025    PRE-OP DIAGNOSIS:   1) Right renal calculus    POST-OP DIAGNOSIS:   1) Right renal calculus    PROCEDURES PERFORMED:  Right percutaneous nephrolithotomy, greater than 4 cm of stone burden      SURGEON:  Adis Hamilton MD    ASSISTANTS:  Surgeons and Role:     * Adis Hamilton MD - Primary  Circulator: Heidy Lucio RN; Monik Martines RN  Radiology Technologist: Deana Valente  Relief Circulator: Matt Gutierrez RN  Relief Scrub: Iliana Mazariegos RN  Scrub Person: Maurice Martin RN; Mitra Nichols RN    NOTE:  There were no qualified teaching residents to assist with this case    ANESTHESIA: General     COMPLICATIONS:   None    ANTIBIOTICS:  Ceftriaxone     INTRAOPERATIVE THROMBOEMBOLISM PROPHYLAXIS:  Pneumatic compression stockings     ESTIMATED BLOOD LOSS:  Minimal      DRAINS:      SPECIMENS:   ID Type Source Tests Collected by Time Destination   A : Right kidney stone fragmments and clot Calculus Kidney, Right STONE ANALYSIS Adis Hamilton MD 4/11/2025 0833          IMPLANTS:   None    COMPLICATIONS: none    DISPOSITION: PACU     OPERATIVE FINDINGS:    Pre-existing  right nephroureteral catheter in good position  Stone not visible on  fluoroscopy  right antegrade pyelogram: no hydronephrosis,  filling defects in renal pelvis and anterior lower pole calyx consistent with CT scan.   right pyeloscopy: demonstrated no residual stone fragments  Given no significant bleeding from kidney, widely patent ureteropelvic junction, and no residual stone fragments, patient was left without any drainage tubes per preference. Risks and benefits discussed preoperatively.     INDICATIONS FOR PROCEDURE:  Yoni Castillo is an 60 y.o. old male with Right renal calculus, measuring ~4.5 cm.  After discussing the options, the patient elected to undergo a percutaneous nephrolithotomy.  We discussed the procedure in detail, the  alternatives, and the risks, and they signed informed consent to proceed.    PROCEDURE IN DETAIL:   The patient was identified and brought to the operating room.  Antibiotic prophylaxis and DVT prophylaxis were administered.  General anesthesia was induced. The patient was then placed in modified supine position with all pressure points padded. The flank was prepped and draped in the usual sterile fashion..  The pre-placed renal access was prepped into the field as was the penis. A 16Fr olguin catheter was placed.     Access had been previously obtained by Interventional Radiology.  After obtaining an antegrade nephrostogram to delineate the anatomy, the pre-placed nephroureteral stent was transected and advanced a wire into the renal pelvis, down the ureter into the bladder.  Using a dual-lumen catheter, an additionalAmplatz Super Stiff wire was advanced as a safety wire. Over the stiff wire the tract was dilated to 30 Faroese using an insufflation balloon under fluoroscopic guidance and a sheath advanced coaxially over this.    The nephroscope was advanced through the sheath and access into the collecting system was excellent. The stone was engaged using the Triligy device. Fragmentation proceeded well. All of the visual stone burden within the calyces and renal pelvis was evacuated with confirmation obtained using direct vision as well as fluoroscopic guidance. Next a flexible cystoscope was introduced and all of the calyceal systems were inspected. Antegrade ureterogram confirmed a widely patent ureter with no filling defects.     When I was satisfied with the patient was stone free I elected to leave the patient tubeless. The ureteral access sheath was removed. No significant bleeding was noted from the nephrostomy tract after observation for 5 minutes. The wire was removed. The collecting system had promptly emptied of contrast down the ureter. The olguin catheter was removed.     After applying 2 minutes of  pressure to the flank incision was closed in 2 layers using 4-0 Monocryl. Local anesthetic was administered. Skin glue was applied.       Adis BUSTAMANTE MD, performed the entire procedure as the primary attending surgeon.    Adis Hamilton MD   [facility]   [facility]

## 2025-04-11 NOTE — ANESTHESIA POSTPROCEDURE EVALUATION
Post-Op Assessment Note    CV Status:  Stable    Pain management: adequate       Mental Status:  Sleepy and arousable   Hydration Status:  Euvolemic   PONV Controlled:  Controlled   Airway Patency:  Patent  Two or more mitigation strategies used for obstructive sleep apnea   Post Op Vitals Reviewed: Yes    No anethesia notable event occurred.    Staff: CRNA           Last Filed PACU Vitals:  Vitals Value Taken Time   Temp 97.3 °F (36.3 °C) 04/11/25 1055   Pulse 72 04/11/25 1100   /70 04/11/25 1055   Resp 16 04/11/25 1055   SpO2 94 % 04/11/25 1100   Vitals shown include unfiled device data.

## 2025-04-11 NOTE — ANESTHESIA POSTPROCEDURE EVALUATION
Post-Op Assessment Note    CV Status:  Stable    Pain management: adequate       Mental Status:  Alert and awake   Hydration Status:  Euvolemic   PONV Controlled:  Controlled   Airway Patency:  Patent     Post Op Vitals Reviewed: Yes    No anethesia notable event occurred.    Staff: Anesthesiologist           Last Filed PACU Vitals:  Vitals Value Taken Time   Temp 97 °F (36.1 °C) 04/11/25 1210   Pulse 72 04/11/25 1245   /82 04/11/25 1215   Resp 12 04/11/25 1245   SpO2 90 % 04/11/25 1245   Vitals shown include unfiled device data.    Modified Rudy:     Vitals Value Taken Time   Activity 2 04/11/25 1110   Respiration 2 04/11/25 1110   Circulation 2 04/11/25 1110   Consciousness 1 04/11/25 1110   Oxygen Saturation 1 04/11/25 1110     Modified Rudy Score: 8

## 2025-04-11 NOTE — PLAN OF CARE
Problem: PAIN - ADULT  Goal: Verbalizes/displays adequate comfort level or baseline comfort level  Description: Interventions:- Encourage patient to monitor pain and request assistance- Assess pain using appropriate pain scale- Administer analgesics based on type and severity of pain and evaluate response- Implement non-pharmacological measures as appropriate and evaluate response- Consider cultural and social influences on pain and pain management- Notify physician/advanced practitioner if interventions unsuccessful or patient reports new pain  Outcome: Progressing     Problem: INFECTION - ADULT  Goal: Absence or prevention of progression during hospitalization  Description: INTERVENTIONS:- Assess and monitor for signs and symptoms of infection- Monitor lab/diagnostic results- Monitor all insertion sites, i.e. indwelling lines, tubes, and drains- Monitor endotracheal if appropriate and nasal secretions for changes in amount and color- [place] appropriate cooling/warming therapies per order- Administer medications as ordered- Instruct and encourage patient and family to use good hand hygiene technique- Identify and instruct in appropriate isolation precautions for identified infection/condition  Outcome: Progressing  Goal: Absence of fever/infection during neutropenic period  Description: INTERVENTIONS:- Monitor WBC  Outcome: Progressing     Problem: SAFETY ADULT  Goal: Patient will remain free of falls  Description: INTERVENTIONS:- Educate patient/family on patient safety including physical limitations- Instruct patient to call for assistance with activity - Consult OT/PT to assist with strengthening/mobility - Keep Call bell within reach- Keep bed low and locked with side rails adjusted as appropriate- Keep care items and personal belongings within reach- Initiate and maintain comfort rounds- Make Fall Risk Sign visible to staff- Offer Toileting every 2 Hours, in advance of need- Initiate/Maintain alarm-  Obtain necessary fall risk management equipment: - Apply yellow socks and bracelet for high fall risk patients- Consider moving patient to room near nurses station  Outcome: Progressing  Goal: Maintain or return to baseline ADL function  Description: INTERVENTIONS:-  Assess patient's ability to carry out ADLs; assess patient's baseline for ADL function and identify physical deficits which impact ability to perform ADLs (bathing, care of mouth/teeth, toileting, grooming, dressing, etc.)- Assess/evaluate cause of self-care deficits - Assess range of motion- Assess patient's mobility; develop plan if impaired- Assess patient's need for assistive devices and provide as appropriate- Encourage maximum independence but intervene and supervise when necessary- Involve family in performance of ADLs- Assess for home care needs following discharge - Consider OT consult to assist with ADL evaluation and planning for discharge- Provide patient education as appropriate  Outcome: Progressing  Goal: Maintains/Returns to pre admission functional level  Description: INTERVENTIONS:- Perform AM-PAC 6 Click Basic Mobility/ Daily Activity assessment daily.- Set and communicate daily mobility goal to care team and patient/family/caregiver. - Collaborate with rehabilitation services on mobility goals if consulted- Perform Range of Motion 4 times a day.- Reposition patient every 2 hours.- Dangle patient 3 times a day- Stand patient 3 times a day- Ambulate patient 3 times a day- Out of bed to chair 3 times a day - Out of bed for meals 3 times a day- Out of bed for toileting- Record patient progress and toleration of activity level   Outcome: Progressing     Problem: DISCHARGE PLANNING  Goal: Discharge to home or other facility with appropriate resources  Description: INTERVENTIONS:- Identify barriers to discharge w/patient and caregiver- Arrange for needed discharge resources and transportation as appropriate- Identify discharge learning  needs (meds, wound care, etc.)- Arrange for interpretive services to assist at discharge as needed- Refer to Case Management Department for coordinating discharge planning if the patient needs post-hospital services based on physician/advanced practitioner order or complex needs related to functional status, cognitive ability, or social support system  Outcome: Progressing     Problem: Knowledge Deficit  Goal: Patient/family/caregiver demonstrates understanding of disease process, treatment plan, medications, and discharge instructions  Description: Complete learning assessment and assess knowledge base.Interventions:- Provide teaching at level of understanding- Provide teaching via preferred learning methods  Outcome: Progressing     Problem: GENITOURINARY - ADULT  Goal: Maintains or returns to baseline urinary function  Description: INTERVENTIONS:- Assess urinary function- Encourage oral fluids to ensure adequate hydration if ordered- Administer IV fluids as ordered to ensure adequate hydration- Administer ordered medications as needed- Offer frequent toileting- Follow urinary retention protocol if ordered  Outcome: Progressing  Goal: Absence of urinary retention  Description: INTERVENTIONS:- Assess patient’s ability to void and empty bladder- Monitor I/O- Bladder scan as needed- Discuss with physician/AP medications to alleviate retention as needed- Discuss catheterization for long term situations as appropriate  Outcome: Progressing

## 2025-04-11 NOTE — INTERVAL H&P NOTE
H&P reviewed. After examining the patient I find no changes in the patients condition since the H&P had been written.    Vitals:    04/11/25 0550   BP: 149/87   Pulse: 72   Resp: 16   Temp: 97.6 °F (36.4 °C)   SpO2: 96%     Vitals:    04/11/25 0550   BP: 149/87   Pulse: 72   Resp: 16   Temp: 97.6 °F (36.4 °C)   SpO2: 96%      General: Well appearing, no acute distress   HEENT: normocephalic, atraumatic  Eyes: extraocular movements intact  Cardiovascular: normal rate   Respiratory: no respiratory distress, effort normal   Abdominal: Non-distended, non-tender   : right nephrostomy tube in good position   MSK: Grossly normal range of motion   Neurological: No gross focal deficits  Behavioral: Normal mood and affect     Proceed with R PCNL as scheduled.     Adis Hamilton MD   [facility]   [facility]

## 2025-04-12 ENCOUNTER — TELEPHONE (OUTPATIENT)
Dept: OTHER | Facility: HOSPITAL | Age: 60
End: 2025-04-12

## 2025-04-12 VITALS
TEMPERATURE: 98.1 F | WEIGHT: 286.6 LBS | HEIGHT: 72 IN | SYSTOLIC BLOOD PRESSURE: 145 MMHG | HEART RATE: 72 BPM | OXYGEN SATURATION: 93 % | DIASTOLIC BLOOD PRESSURE: 83 MMHG | RESPIRATION RATE: 16 BRPM | BODY MASS INDEX: 38.82 KG/M2

## 2025-04-12 DIAGNOSIS — R31.0 GROSS HEMATURIA: Primary | ICD-10-CM

## 2025-04-12 LAB
ANION GAP SERPL CALCULATED.3IONS-SCNC: 5 MMOL/L (ref 4–13)
BUN SERPL-MCNC: 24 MG/DL (ref 5–25)
CALCIUM SERPL-MCNC: 8.4 MG/DL (ref 8.4–10.2)
CHLORIDE SERPL-SCNC: 100 MMOL/L (ref 96–108)
CO2 SERPL-SCNC: 26 MMOL/L (ref 21–32)
CREAT SERPL-MCNC: 1.2 MG/DL (ref 0.6–1.3)
ERYTHROCYTE [DISTWIDTH] IN BLOOD BY AUTOMATED COUNT: 12.5 % (ref 11.6–15.1)
GFR SERPL CREATININE-BSD FRML MDRD: 64 ML/MIN/1.73SQ M
GLUCOSE P FAST SERPL-MCNC: 171 MG/DL (ref 65–99)
GLUCOSE SERPL-MCNC: 171 MG/DL (ref 65–140)
HCT VFR BLD AUTO: 38.5 % (ref 36.5–49.3)
HGB BLD-MCNC: 12.8 G/DL (ref 12–17)
MCH RBC QN AUTO: 30.3 PG (ref 26.8–34.3)
MCHC RBC AUTO-ENTMCNC: 33.2 G/DL (ref 31.4–37.4)
MCV RBC AUTO: 91 FL (ref 82–98)
PLATELET # BLD AUTO: 166 THOUSANDS/UL (ref 149–390)
PMV BLD AUTO: 9.9 FL (ref 8.9–12.7)
POTASSIUM SERPL-SCNC: 4.6 MMOL/L (ref 3.5–5.3)
RBC # BLD AUTO: 4.22 MILLION/UL (ref 3.88–5.62)
SODIUM SERPL-SCNC: 131 MMOL/L (ref 135–147)
WBC # BLD AUTO: 4.22 THOUSAND/UL (ref 4.31–10.16)

## 2025-04-12 PROCEDURE — NC001 PR NO CHARGE: Performed by: NURSE PRACTITIONER

## 2025-04-12 PROCEDURE — 85027 COMPLETE CBC AUTOMATED: CPT

## 2025-04-12 PROCEDURE — 80048 BASIC METABOLIC PNL TOTAL CA: CPT

## 2025-04-12 PROCEDURE — 99024 POSTOP FOLLOW-UP VISIT: CPT | Performed by: NURSE PRACTITIONER

## 2025-04-12 RX ORDER — KETOROLAC TROMETHAMINE 30 MG/ML
15 INJECTION, SOLUTION INTRAMUSCULAR; INTRAVENOUS EVERY 6 HOURS PRN
Status: DISCONTINUED | OUTPATIENT
Start: 2025-04-12 | End: 2025-04-12 | Stop reason: HOSPADM

## 2025-04-12 RX ORDER — SULFAMETHOXAZOLE AND TRIMETHOPRIM 800; 160 MG/1; MG/1
1 TABLET ORAL EVERY 12 HOURS SCHEDULED
Qty: 10 TABLET | Refills: 0 | Status: SHIPPED | OUTPATIENT
Start: 2025-04-12 | End: 2025-04-17

## 2025-04-12 RX ORDER — OXYCODONE HYDROCHLORIDE 5 MG/1
5 TABLET ORAL EVERY 6 HOURS PRN
Qty: 12 TABLET | Refills: 0 | Status: SHIPPED | OUTPATIENT
Start: 2025-04-12 | End: 2025-04-15

## 2025-04-12 RX ORDER — CELECOXIB 200 MG/1
200 CAPSULE ORAL 2 TIMES DAILY
Qty: 10 CAPSULE | Refills: 0 | Status: SHIPPED | OUTPATIENT
Start: 2025-04-12 | End: 2025-04-17

## 2025-04-12 RX ADMIN — HYDROCHLOROTHIAZIDE 12.5 MG: 12.5 TABLET ORAL at 08:07

## 2025-04-12 RX ADMIN — ACETAMINOPHEN 975 MG: 325 TABLET, FILM COATED ORAL at 02:33

## 2025-04-12 RX ADMIN — ACETAMINOPHEN 975 MG: 325 TABLET, FILM COATED ORAL at 08:07

## 2025-04-12 RX ADMIN — CARVEDILOL 25 MG: 25 TABLET, FILM COATED ORAL at 08:07

## 2025-04-12 RX ADMIN — KETOROLAC TROMETHAMINE 15 MG: 30 INJECTION, SOLUTION INTRAMUSCULAR; INTRAVENOUS at 08:58

## 2025-04-12 RX ADMIN — LISINOPRIL 20 MG: 20 TABLET ORAL at 08:07

## 2025-04-12 RX ADMIN — SODIUM CHLORIDE, SODIUM LACTATE, POTASSIUM CHLORIDE, AND CALCIUM CHLORIDE 75 ML/HR: .6; .31; .03; .02 INJECTION, SOLUTION INTRAVENOUS at 08:05

## 2025-04-12 NOTE — PLAN OF CARE
Problem: PAIN - ADULT  Goal: Verbalizes/displays adequate comfort level or baseline comfort level  Description: Interventions:- Encourage patient to monitor pain and request assistance- Assess pain using appropriate pain scale- Administer analgesics based on type and severity of pain and evaluate response- Implement non-pharmacological measures as appropriate and evaluate response- Consider cultural and social influences on pain and pain management- Notify physician/advanced practitioner if interventions unsuccessful or patient reports new pain  Outcome: Progressing     Problem: INFECTION - ADULT  Goal: Absence or prevention of progression during hospitalization  Description: INTERVENTIONS:- Assess and monitor for signs and symptoms of infection- Monitor lab/diagnostic results- Monitor all insertion sites, i.e. indwelling lines, tubes, and drains- Monitor endotracheal if appropriate and nasal secretions for changes in amount and color- [place] appropriate cooling/warming therapies per order- Administer medications as ordered- Instruct and encourage patient and family to use good hand hygiene technique- Identify and instruct in appropriate isolation precautions for identified infection/condition  Outcome: Progressing  Goal: Absence of fever/infection during neutropenic period  Description: INTERVENTIONS:- Monitor WBC  Outcome: Progressing     Problem: SAFETY ADULT  Goal: Patient will remain free of falls  Description: INTERVENTIONS:- Educate patient/family on patient safety including physical limitations- Instruct patient to call for assistance with activity - Consult OT/PT to assist with strengthening/mobility - Keep Call bell within reach- Keep bed low and locked with side rails adjusted as appropriate- Keep care items and personal belongings within reach- Initiate and maintain comfort rounds- Make Fall Risk Sign visible to staff- Offer Toileting every Hours, in advance of need- Initiate/Maintain alarm- Obtain  necessary fall risk management equipment: - Apply yellow socks and bracelet for high fall risk patients- Consider moving patient to room near nurses station  Outcome: Progressing  Goal: Maintain or return to baseline ADL function  Description: INTERVENTIONS:-  Assess patient's ability to carry out ADLs; assess patient's baseline for ADL function and identify physical deficits which impact ability to perform ADLs (bathing, care of mouth/teeth, toileting, grooming, dressing, etc.)- Assess/evaluate cause of self-care deficits - Assess range of motion- Assess patient's mobility; develop plan if impaired- Assess patient's need for assistive devices and provide as appropriate- Encourage maximum independence but intervene and supervise when necessary- Involve family in performance of ADLs- Assess for home care needs following discharge - Consider OT consult to assist with ADL evaluation and planning for discharge- Provide patient education as appropriate  Outcome: Progressing  Goal: Maintains/Returns to pre admission functional level  Description: INTERVENTIONS:- Perform AM-PAC 6 Click Basic Mobility/ Daily Activity assessment daily.- Set and communicate daily mobility goal to care team and patient/family/caregiver. - Collaborate with rehabilitation services on mobility goals if consulted- Perform Range of Motion  times a day.- Reposition patient every  hours.- Dangle patient  times a day- Stand patient  times a day- Ambulate patient  times a day- Out of bed to chair times a day - Out of bed for meals  times a day- Out of bed for toileting- Record patient progress and toleration of activity level   Outcome: Progressing     Problem: DISCHARGE PLANNING  Goal: Discharge to home or other facility with appropriate resources  Description: INTERVENTIONS:- Identify barriers to discharge w/patient and caregiver- Arrange for needed discharge resources and transportation as appropriate- Identify discharge learning needs (meds, wound  care, etc.)- Arrange for interpretive services to assist at discharge as needed- Refer to Case Management Department for coordinating discharge planning if the patient needs post-hospital services based on physician/advanced practitioner order or complex needs related to functional status, cognitive ability, or social support system  Outcome: Progressing     Problem: Knowledge Deficit  Goal: Patient/family/caregiver demonstrates understanding of disease process, treatment plan, medications, and discharge instructions  Description: Complete learning assessment and assess knowledge base.Interventions:- Provide teaching at level of understanding- Provide teaching via preferred learning methods  Outcome: Progressing     Problem: GENITOURINARY - ADULT  Goal: Maintains or returns to baseline urinary function  Description: INTERVENTIONS:- Assess urinary function- Encourage oral fluids to ensure adequate hydration if ordered- Administer IV fluids as ordered to ensure adequate hydration- Administer ordered medications as needed- Offer frequent toileting- Follow urinary retention protocol if ordered  Outcome: Progressing  Goal: Absence of urinary retention  Description: INTERVENTIONS:- Assess patient’s ability to void and empty bladder- Monitor I/O- Bladder scan as needed- Discuss with physician/AP medications to alleviate retention as needed- Discuss catheterization for long term situations as appropriate  Outcome: Progressing

## 2025-04-12 NOTE — ASSESSMENT & PLAN NOTE
Postoperative day 1 right percutaneous nephrolithotomy  Afebrile, hemodynamically stable.  Challenged by pain control.  Toradol added.  Labs are acceptable.    Plan:   stable for discharge with antibiotics and pain medication.

## 2025-04-12 NOTE — DISCHARGE SUMMARY
Discharge Summary - Urology   Name: Yoni Castillo 60 y.o. male I MRN: [id number]  Unit/Bed#: [address] 202-01 I Date of Admission: 4/11/2025   Date of Service: 4/12/2025 I Hospital Day: 0  { ?Quick Links I Problem List I PORCH I Billing Tip:19734}  {sl ip specialty discharge summaries:34712}   provided to patient and family.      Discharge Statement:  I have spent a total time of 20 minutes in caring for this patient on the day of the visit/encounter. >30 minutes of time was spent on: Diagnostic results, Instructions for management, Impressions, Counseling / Coordination of care, Documenting in the medical record, Reviewing / ordering tests, medicine, procedures  , and Communicating with other healthcare professionals .

## 2025-04-12 NOTE — PLAN OF CARE
Problem: PAIN - ADULT  Goal: Verbalizes/displays adequate comfort level or baseline comfort level  Description: Interventions:- Encourage patient to monitor pain and request assistance- Assess pain using appropriate pain scale- Administer analgesics based on type and severity of pain and evaluate response- Implement non-pharmacological measures as appropriate and evaluate response- Consider cultural and social influences on pain and pain management- Notify physician/advanced practitioner if interventions unsuccessful or patient reports new pain  Outcome: Progressing     Problem: INFECTION - ADULT  Goal: Absence or prevention of progression during hospitalization  Description: INTERVENTIONS:- Assess and monitor for signs and symptoms of infection- Monitor lab/diagnostic results- Monitor all insertion sites, i.e. indwelling lines, tubes, and drains- Monitor endotracheal if appropriate and nasal secretions for changes in amount and color- [place] appropriate cooling/warming therapies per order- Administer medications as ordered- Instruct and encourage patient and family to use good hand hygiene technique- Identify and instruct in appropriate isolation precautions for identified infection/condition  Outcome: Progressing  Goal: Absence of fever/infection during neutropenic period  Description: INTERVENTIONS:- Monitor WBC  Outcome: Progressing     Problem: SAFETY ADULT  Goal: Patient will remain free of falls  Description: INTERVENTIONS:- Educate patient/family on patient safety including physical limitations- Instruct patient to call for assistance with activity - Consult OT/PT to assist with strengthening/mobility - Keep Call bell within reach- Keep bed low and locked with side rails adjusted as appropriate- Keep care items and personal belongings within reach- Initiate and maintain comfort rounds- Make Fall Risk Sign visible to staff- Offer Toileting every 2 Hours, in advance of need- Initiate/Maintain alarm- Apply  yellow socks and bracelet for high fall risk patients- Consider moving patient to room near nurses station  Outcome: Progressing  Goal: Maintain or return to baseline ADL function  Description: INTERVENTIONS:-  Assess patient's ability to carry out ADLs; assess patient's baseline for ADL function and identify physical deficits which impact ability to perform ADLs (bathing, care of mouth/teeth, toileting, grooming, dressing, etc.)- Assess/evaluate cause of self-care deficits - Assess range of motion- Assess patient's mobility; develop plan if impaired- Assess patient's need for assistive devices and provide as appropriate- Encourage maximum independence but intervene and supervise when necessary- Involve family in performance of ADLs- Assess for home care needs following discharge - Consider OT consult to assist with ADL evaluation and planning for discharge- Provide patient education as appropriate  Outcome: Progressing  Goal: Maintains/Returns to pre admission functional level  Description: INTERVENTIONS:- Perform AM-PAC 6 Click Basic Mobility/ Daily Activity assessment daily.- Set and communicate daily mobility goal to care team and patient/family/caregiver. - Collaborate with rehabilitation services on mobility goals if consulted- Perform Range of Motion 4 times a day.- Reposition patient every 2 hours.- Dangle patient 3 times a day- Stand patient 3 times a day- Ambulate patient 3 times a day- Out of bed to chair 3 times a day - Out of bed for meals 3 times a day- Out of bed for toileting- Record patient progress and toleration of activity level   Outcome: Progressing     Problem: DISCHARGE PLANNING  Goal: Discharge to home or other facility with appropriate resources  Description: INTERVENTIONS:- Identify barriers to discharge w/patient and caregiver- Arrange for needed discharge resources and transportation as appropriate- Identify discharge learning needs (meds, wound care, etc.)- Arrange for interpretive  services to assist at discharge as needed- Refer to Case Management Department for coordinating discharge planning if the patient needs post-hospital services based on physician/advanced practitioner order or complex needs related to functional status, cognitive ability, or social support system  Outcome: Progressing     Problem: Knowledge Deficit  Goal: Patient/family/caregiver demonstrates understanding of disease process, treatment plan, medications, and discharge instructions  Description: Complete learning assessment and assess knowledge base.Interventions:- Provide teaching at level of understanding- Provide teaching via preferred learning methods  Outcome: Progressing     Problem: GENITOURINARY - ADULT  Goal: Maintains or returns to baseline urinary function  Description: INTERVENTIONS:- Assess urinary function- Encourage oral fluids to ensure adequate hydration if ordered- Administer IV fluids as ordered to ensure adequate hydration- Administer ordered medications as needed- Offer frequent toileting- Follow urinary retention protocol if ordered  Outcome: Progressing  Goal: Absence of urinary retention  Description: INTERVENTIONS:- Assess patient’s ability to void and empty bladder- Monitor I/O- Bladder scan as needed- Discuss with physician/AP medications to alleviate retention as needed- Discuss catheterization for long term situations as appropriate  Outcome: Progressing

## 2025-04-12 NOTE — NURSING NOTE
Discussed discharge instructions with patient. Patient verbalized understanding of education. IV removed. Patient left to home with all belongings.

## 2025-04-12 NOTE — PROGRESS NOTES
Progress Note - Urology   Name: Yoni Castillo 60 y.o. male I MRN: [id number]  Unit/Bed#: [place] [address] -01 I Date of Admission: 4/11/2025   Date of Service: 4/12/2025 I Hospital Day: 0    Assessment & Plan  Nephrolithiasis  Postoperative day 1 right percutaneous nephrolithotomy  Afebrile, hemodynamically stable.  Challenged by pain control.  Toradol added.  Labs are acceptable.    Plan:   stable for discharge with antibiotics and pain medication.      Subjective   Yoni Castillo  Is a 60-year-old male with history of nephrolithiasis and substantial right stone burden postoperative day 1 right percutaneous nephrolithotomy--tubeless.  He remained overnight for monitoring.  He is afebrile, hemodynamically stable but expresses persistent flank pain.    Otherwise tolerating diet and voiding volitionally.    Objective :  Temp:  [97 °F (36.1 °C)-98.7 °F (37.1 °C)] 98.1 °F (36.7 °C)  HR:  [61-79] 72  BP: (136-188)/(70-96) 145/83  Resp:  [11-16] 16  SpO2:  [91 %-96 %] 93 %  O2 Device: None (Room air)  Nasal Cannula O2 Flow Rate (L/min):  [3 L/min] 3 L/min    I/O         04/10 0701  04/11 0700 04/11 0701  04/12 0700 04/12 0701  04/13 0700    P.O.  960     I.V. (mL/kg)  1500 (11.5)     IV Piggyback  100     Total Intake(mL/kg)  2560 (19.7)     Urine (mL/kg/hr)  1350 (0.4)     Blood  100     Total Output  1450     Net  +1110                    Physical Exam  General appearance: alert and oriented, in no acute distress, appears stated age, cooperative, and no distress  Head: Normocephalic, without obvious abnormality, atraumatic  Neck: no JVD and supple, symmetrical, trachea midline  Lungs: diminished breath sounds  Heart: regular rate and rhythm, S1, S2 normal, no murmur, click, rub or gallop  Abdomen: soft, non-tender; bowel sounds normal; no masses,  no organomegaly  Extremities: extremities normal, warm and well-perfused; no cyanosis, clubbing, or edema  Pulses: 2+ and symmetric  Neurologic: Grossly normal  No surgical  drains      Lab Results: I have reviewed the following results:  Recent Labs     04/12/25  0541   WBC 4.22*   HGB 12.8   HCT 38.5      SODIUM 131*   K 4.6      CO2 26   BUN 24   CREATININE 1.20   GLUC 171*

## 2025-04-14 ENCOUNTER — OFFICE VISIT (OUTPATIENT)
Dept: OCCUPATIONAL THERAPY | Age: 60
End: 2025-04-14
Payer: MEDICARE

## 2025-04-14 DIAGNOSIS — M25.631 WRIST STIFFNESS, RIGHT: ICD-10-CM

## 2025-04-14 DIAGNOSIS — M65.949 TENOSYNOVITIS OF FINGER AND HAND: Primary | ICD-10-CM

## 2025-04-14 DIAGNOSIS — M77.8 RIGHT WRIST TENDONITIS: ICD-10-CM

## 2025-04-14 DIAGNOSIS — M25.641 STIFFNESS OF FINGER JOINT OF RIGHT HAND: ICD-10-CM

## 2025-04-14 DIAGNOSIS — S61.452D ANIMAL BITE OF LEFT HAND WITH INFECTION, SUBSEQUENT ENCOUNTER: ICD-10-CM

## 2025-04-14 DIAGNOSIS — L08.9 ANIMAL BITE OF LEFT HAND WITH INFECTION, SUBSEQUENT ENCOUNTER: ICD-10-CM

## 2025-04-14 DIAGNOSIS — Z47.89 AFTERCARE FOLLOWING SURGERY OF THE MUSCULOSKELETAL SYSTEM: ICD-10-CM

## 2025-04-14 PROCEDURE — 97110 THERAPEUTIC EXERCISES: CPT

## 2025-04-14 PROCEDURE — 97140 MANUAL THERAPY 1/> REGIONS: CPT

## 2025-04-14 NOTE — PROGRESS NOTES
"Daily Note     Today's date: 2025  Patient name: Yoni Castillo  : 1965  MRN: [id number]  Referring provider: Yesenia Carrasquillo PA-C  Dx:   Encounter Diagnosis     ICD-10-CM    1. Tenosynovitis of finger and hand  M65.949       2. Animal bite of left hand with infection, subsequent encounter  S61.452D     L08.9       3. Aftercare following surgery of the musculoskeletal system  Z47.89       4. Right wrist tendonitis  M77.8       5. Wrist stiffness, right  M25.631       6. Stiffness of finger joint of right hand  M25.641           Start Time: 1400  Stop Time: 1445  Total time in clinic (min): 45 minutes    Subjective: \"it stopped leaking.\"      Objective: See treatment diary below    Assessment: Tolerated treatment well. Pt's ulnar sided wound is closed. Continues with significant stiffness in the wrist and fingers. Tolerated all exercises today with minimal cues to perform tendon glides and stretches. Patient would benefit from continued OT      Plan: Continue per plan of care.  Progress treatment as tolerated.       Precautions: 3/24 FCR and FCU debridement and tenosynovectomy     Manuals  3/27  3/31  4/3  4/10  4/14         Edema mob    10'  10'  10'  10'        Scar mob              STM    5'  5'  5'  5'                                              Ther Ex                       Forearm AROM 2x20  2x20  2x20  2x20  2x20        Wrist P/AROM 2x20 2x20 2x20 2x20  2x20        Tendon glides 2x20 2x20 2x20 2x20  2x20        Thumb P/AROM X20 all planes X20 all planes X20 all planes X20 all planes  X20 all planes        Ball stretch      10x10s        Strengthening   Puttycise \"L\" tool 10' Puttycise \"L\" tool 10' Puttycise \"L\" tool 10'                                                                                                               Ther Activity                   Grasp/release                                                                                                                     Neuro " Re-Ed                                                               Ortho Fit                                                               Modalities                      5' 5'  5'  5'

## 2025-04-15 ENCOUNTER — OFFICE VISIT (OUTPATIENT)
Age: 60
End: 2025-04-15
Payer: MEDICARE

## 2025-04-15 ENCOUNTER — TELEPHONE (OUTPATIENT)
Dept: ADMINISTRATIVE | Facility: OTHER | Age: 60
End: 2025-04-15

## 2025-04-15 VITALS
HEIGHT: 72 IN | HEART RATE: 78 BPM | SYSTOLIC BLOOD PRESSURE: 126 MMHG | WEIGHT: 294.8 LBS | BODY MASS INDEX: 39.93 KG/M2 | TEMPERATURE: 99.2 F | DIASTOLIC BLOOD PRESSURE: 80 MMHG | OXYGEN SATURATION: 92 %

## 2025-04-15 DIAGNOSIS — I10 ESSENTIAL HYPERTENSION: ICD-10-CM

## 2025-04-15 DIAGNOSIS — E11.22 TYPE 2 DIABETES MELLITUS WITH CHRONIC KIDNEY DISEASE, WITHOUT LONG-TERM CURRENT USE OF INSULIN, UNSPECIFIED CKD STAGE (HCC): ICD-10-CM

## 2025-04-15 DIAGNOSIS — R39.9 UTI SYMPTOMS: ICD-10-CM

## 2025-04-15 DIAGNOSIS — M25.511 ACUTE PAIN OF RIGHT SHOULDER: ICD-10-CM

## 2025-04-15 DIAGNOSIS — Z01.818 PREOPERATIVE CLEARANCE: Primary | ICD-10-CM

## 2025-04-15 PROCEDURE — 99214 OFFICE O/P EST MOD 30 MIN: CPT | Performed by: INTERNAL MEDICINE

## 2025-04-15 PROCEDURE — G2211 COMPLEX E/M VISIT ADD ON: HCPCS | Performed by: INTERNAL MEDICINE

## 2025-04-15 NOTE — TELEPHONE ENCOUNTER
----- Message from Geno PALAFOX sent at 4/15/2025 11:11 AM EDT -----  04/15/25 11:12 AM    Hello, our patient Yoni Castillo has had Diabetic Eye Exam completed/performed. Please assist in updating the patient chart by making an External outreach to [facility] facility located in [place], [place]. The date of service is Nov 2024.    Thank you,  Angelina Wachter, MA  [facility] INTERNAL MED

## 2025-04-15 NOTE — LETTER
Diabetic Eye Exam Form    Date Requested: 04/15/25  Patient: Yoni Castillo  Patient : 1965   Referring Provider: Judd Ji MD      DIABETIC Eye Exam Date _______________________________      Type of Exam MUST be documented for Diabetic Eye Exams. Please CHECK ONE.     Retinal Exam       Dilated Retinal Exam       OCT       Optomap-Iris Exam      Fundus Photography       Left Eye - Please check Retinopathy or No Retinopathy        Exam did show retinopathy    Exam did not show retinopathy       Right Eye - Please check Retinopathy or No Retinopathy       Exam did show retinopathy    Exam did not show retinopathy       Comments __________________________________________________________    Practice Providing Exam ______________________________________________    Exam Performed By (print name) _______________________________________      Provider Signature ___________________________________________________      These reports are needed for  compliance.    Please fax this completed form and a copy of the Diabetic Eye Exam report   to the [facility] Department as soon as possible via   Fax [phone] attention Skyla: Phone [phone].   Our office located at [address] [place] [address]     We thank you for your assistance in treating our mutual patient.

## 2025-04-15 NOTE — ASSESSMENT & PLAN NOTE
Current blood pressure assessment confirms good control of hypertension recommend continuation of lisinopril hydrochlorothiazide 20-12.5 mg 1 tablet twice a day and continuation of carvedilol 25 mg twice a day

## 2025-04-15 NOTE — ASSESSMENT & PLAN NOTE
Recent removal of stone from right kidney/ureter.  Now showing some gross hematuria low-grade fever.  No pain in kidney or bladder location.  I have ordered urinalysis reflex to culture recommend the patient notify his urologist of hematuria and fever.    Orders:    UA w Reflex to Microscopic w Reflex to Culture; Future

## 2025-04-15 NOTE — PROGRESS NOTES
Name: Yoni Castillo      : 1965      MRN: [id number]  Encounter Provider: Judd Ji MD  Encounter Date: 4/15/2025   Encounter department: [facility] INTERNAL MEDICINE    Assessment & Plan  UTI symptoms  Recent removal of stone from right kidney/ureter.  Now showing some gross hematuria low-grade fever.  No pain in kidney or bladder location.  I have ordered urinalysis reflex to culture recommend the patient notify his urologist of hematuria and fever.    Orders:    UA w Reflex to Microscopic w Reflex to Culture; Future    Acute pain of right shoulder  Acute pain right shoulder seems to occur after surgery on right hand for cellulitis.  May have been positional examination does not show any limitation range of motion or any tenderness in the musculature.  Check x-ray for possible underlying osteoarthritis    Orders:    XR shoulder 2+ vw right; Future    Essential hypertension  Current blood pressure assessment confirms good control of hypertension recommend continuation of lisinopril hydrochlorothiazide 20-12.5 mg 1 tablet twice a day and continuation of carvedilol 25 mg twice a day         Type 2 diabetes mellitus with chronic kidney disease, without long-term current use of insulin, unspecified CKD stage (HCC)  Most recent blood work reviewed.  Hemoglobin A1c is elevated at 10.4.  Patient indicates that this is a lifestyle issue.  Recently lost 2 dogs and felt upset started eating foods that he knows he should not.  Is now back on a solid diabetic release out diet expect that his blood sugars should improve.  Lab Results   Component Value Date    HGBA1C 10.4 (H) 2025            Preoperative clearance  Physical examination review of recent hospitalization for stone removal and review of EKG as well as recent laboratory testing today patient is medically cleared to receive anesthesia prior to MRI scanning of his head.              History of Present Illness     This 60-year-old  gentleman presents today for medical clearance prior to anesthesia for upcoming MRI scanning.  Indicates no cardiac symptoms of chest pain palpitation shortness of breath.  He denies any issues with anesthesia in the past.  The MRI scan is of his brain and his ordered by his neurologist for increasing frequency and severity of headaches.    He does have some evidence of gross hematuria.  Recently had a rather large stone removed by urology services at [facility].  Has a low-grade fever may be early signs of an infection.  Urinalysis has been requested I have urged him to also contact his urologist to update them on the status of his temperature and hematuria.    Patient did experience some right shoulder pain after recent hand surgery.  Seems to have normal range of motion of the shoulder with no tenderness over the biceps triceps or rotator cuff.  Suspect this may be an arthritic issue perhaps related to positioning of the arm during surgery.  X-rays of the shoulder have been requested.  We will review the results of these x-rays with the patient upon completion.          Review of Systems   Genitourinary:  Positive for hematuria.   Musculoskeletal:  Positive for arthralgias.        Right shoulder pain   Neurological:  Positive for headaches.   All other systems reviewed and are negative.    Past Medical History:   Diagnosis Date    Allergic 1968    Seasonal    Arthritis 2012    Cervical disc disease     Chronic kidney disease 2012    Chronic pain disorder     CTS (carpal tunnel syndrome) 2002    Diabetes mellitus (HCC)     Full dentures     only using upper    Head injury 1975    Headache(784.0) 1980    High blood sugar     Hypertension     Kidney stone     Liver disease     Lumbar disc disease     Migraines     Neuropathy in diabetes (HCC)     RLS (restless legs syndrome)     Skin cancer     in past    Visual impairment 2017    Cataracts. Surgery to correct in 2021     Past Surgical History:   Procedure  Laterality Date    AMPUTATION  2022    Little toe left    CATARACT EXTRACTION Bilateral     COLONOSCOPY      CONTRACTURE Right 01/20/2025    Procedure: Right wrist and hand extensor tenolysis and wrist capsulotomy;  Surgeon: Carroll Mccarthy MD;  [facility];  Service: Orthopedics    EYE SURGERY  2021    FOOT SURGERY  2022    Left Foot    FRACTURE SURGERY  1968    Right Tib/Fib    GANGLION CYST EXCISION Left 03/25/2024    Procedure: EXCISION MUCOID CYST, INDEX FINGER DIP;  Surgeon: Carroll Mccarthy MD;  [facility];  Service: Orthopedics    GANGLION CYST EXCISION Right 05/20/2024    Procedure: EXCISION MUCOID CYST - RIGHT INDEX AND MIDDLE FINGERS;  Surgeon: Carroll Mccarthy MD;  [facility];  Service: Orthopedics    HERNIA REPAIR      INCISION AND DRAINAGE  01/16/2024    IR NEPHROURETERAL ACCESS FOR UROLOGY PCNL  04/07/2025    KIDNEY SURGERY Right 06/2012    KNEE SURGERY Right 1980    MOUTH SURGERY      ME AMPUTATION METATARSAL W/TOE SINGLE Left 6/1/2022    Procedure: RAY RESECTION FOOT;  Surgeon: Hannah Melgoza DPM;  [facility];  Service: Podiatry    ME INCISION & DRAINAGE ABSCESS COMPLICATED/MULTIPLE Right 09/17/2024    Procedure: Irrigation and debridement right wrist and hand, any indicated procedures;  Surgeon: Carroll Mccarthy MD;  [facility];  Service: Orthopedics    ME INCISION EXTENSOR TENDON SHEATH WRIST Right 01/20/2025    Procedure: RELEASE DEQUERVAINS - Right;  Surgeon: Carroll Mccarthy MD;  [facility];  Service: Orthopedics    ME PERQ NL/PL LITHOTRP COMPLEX >2 CM MLT LOCATIONS Right 04/11/2025    Procedure: NEPHROLITHOTOMY  PERCUTANEOUS (PCNL);  Surgeon: Adis Hamilton MD;  [facility];  Service: Urology    ME RPR AA HERNIA 1ST < 3 CM REDUCIBLE N/A 7/14/2023    Procedure: REPAIR HERNIA INCISIONAL;  Surgeon: Matt Melo MD;  [facility];  Service: General    ME SYNOVECTOMY EXTENSOR  TENDON SHTH WRIST 1 CMPRT Right 10/03/2024    Procedure: Irrigation and debridement, right wrist, volar and dorsal, possible extensor and flexor tenosynovectomy, any indicated procedures;  Surgeon: Carroll Mccarthy MD;  [facility];  Service: Orthopedics    MA SYNOVECTOMY EXTENSOR TENDON SHTH WRIST 1 CMPRT Right 03/24/2025    Procedure: SYNOVECTOMY / DEBRIDEMENT - FLEXOR CARPI RADIALIS AND FLEXOR CARPI ULNARIS TENDONS- RIGHT;  Surgeon: Carroll Mccarthy MD;  [facility];  Service: Orthopedics    TONSILLECTOMY  1968    Yes    WOUND DEBRIDEMENT Left 4/28/2022    Procedure: Left foot washout;  Surgeon: Hannah Melgoza DPM;  [facility];  Service: Podiatry    WOUND DEBRIDEMENT Left 6/6/2022    Procedure: DEBRIDEMENT WOUND (WASH OUT);  Surgeon: Hannah Melgoza DPM;  [facility];  Service: Podiatry     Family History   Problem Relation Age of Onset    Diabetes Paternal Grandmother     Diabetes Paternal Grandfather     Arthritis Maternal Grandmother      Social History     Tobacco Use    Smoking status: Never    Smokeless tobacco: Never   Vaping Use    Vaping status: Never Used   Substance and Sexual Activity    Alcohol use: Yes     Alcohol/week: 1.0 standard drink of alcohol     Types: 1 Cans of beer per week     Comment: ocassional    Drug use: Never    Sexual activity: Not Currently     Partners: Female     Birth control/protection: Abstinence     Current Outpatient Medications on File Prior to Visit   Medication Sig    Accu-Chek FastClix Lancets MISC Use to test blood sugar once a day    acetaminophen (TYLENOL) 500 mg tablet Take 2 tablets (1,000 mg total) by mouth every 8 (eight) hours    amitriptyline (ELAVIL) 10 mg tablet start 10mg at bedtime. Increase by 10mg each week until good effect on headaches/pain or reach 50mg daily    ammonium lactate (LAC-HYDRIN) 12 % lotion 2 (two) times a day as needed    Blood Glucose Monitoring Suppl (Accu-Chek Guide Me) w/Device KIT Use  to check blood sugar once a day    carvedilol (COREG) 25 mg tablet Take 1 tablet (25 mg total) by mouth 2 (two) times a day with meals    celecoxib (CeleBREX) 200 mg capsule Take 1 capsule (200 mg total) by mouth 2 (two) times a day for 5 days    Empagliflozin (Jardiance) 25 MG TABS Take 1 tablet (25 mg total) by mouth daily    glucose blood (Accu-Chek Guide) test strip Use to check blood sugar once a day    Insulin Pen Needle (BD Pen Needle [place] U/F) 32G X 4 MM MISC Use in the morning    ketoconazole (NIZORAL) 2 % cream if needed    lidocaine (Lidoderm) 5 % Apply 1 patch topically over 12 hours daily Remove & Discard patch within 12 hours or as directed by MD    lisinopril-hydrochlorothiazide (PRINZIDE,ZESTORETIC) 20-12.5 MG per tablet Take 1 tablet by mouth 2 (two) times a day    methocarbamol (ROBAXIN) 750 mg tablet Take 1 tablet (750 mg total) by mouth daily as needed for muscle spasms    oxyCODONE (Roxicodone) 5 immediate release tablet Take 1 tablet (5 mg total) by mouth every 6 (six) hours as needed for moderate pain for up to 3 days Max Daily Amount: 20 mg    sulfamethoxazole-trimethoprim (BACTRIM DS) 800-160 mg per tablet Take 1 tablet by mouth every 12 (twelve) hours for 5 days    traMADol (Ultram) 50 mg tablet Take 2 tablets (100 mg total) by mouth daily at bedtime as needed for severe pain    Ubrogepant (UBRELVY) 100 MG tablet Take 1 tablet (100 mg) one time as needed for migraine. May repeat one additional tablet (100 mg) at least two hours after the first dose. Do not use more than two doses per day, or for more than twelve days per month.     Allergies   Allergen Reactions    Cephalexin Hives     Skin peeling  Tolerates penicillins (tolerated unasyn and zosyn)    Metformin GI Intolerance    Pollen Extract Sneezing     Immunization History   Administered Date(s) Administered    Tdap 02/17/2022, 02/17/2022     Objective   /80   Pulse 78   Temp 99.2 °F (37.3 °C) (Tympanic)   Ht 6' (1.829 m)    Wt 134 kg (294 lb 12.8 oz)   SpO2 92%   BMI 39.98 kg/m²     Physical Exam  Vitals and nursing note reviewed.   Constitutional:       General: He is not in acute distress.     Appearance: He is well-developed.   HENT:      Head: Normocephalic and atraumatic.   Eyes:      Conjunctiva/sclera: Conjunctivae normal.   Cardiovascular:      Rate and Rhythm: Normal rate and regular rhythm.      Heart sounds: Normal heart sounds. No murmur heard.  Pulmonary:      Effort: Pulmonary effort is normal. No respiratory distress.      Breath sounds: Normal breath sounds. No wheezing, rhonchi or rales.   Abdominal:      Palpations: Abdomen is soft.      Tenderness: There is no abdominal tenderness.   Musculoskeletal:         General: No swelling.      Cervical back: Neck supple.      Comments: Examination of the right shoulder reveals no decrease in range of motion.  He has no tenderness demonstrated on pressure over the triceps or bicep muscle or tendons nor any evidence of tenderness over the rotator cuff.  Motion of the shoulder is smooth   Skin:     General: Skin is warm and dry.      Capillary Refill: Capillary refill takes less than 2 seconds.   Neurological:      Mental Status: He is alert.   Psychiatric:         Mood and Affect: Mood normal.

## 2025-04-15 NOTE — ASSESSMENT & PLAN NOTE
Physical examination review of recent hospitalization for stone removal and review of EKG as well as recent laboratory testing today patient is medically cleared to receive anesthesia prior to MRI scanning of his head.

## 2025-04-15 NOTE — ASSESSMENT & PLAN NOTE
Most recent blood work reviewed.  Hemoglobin A1c is elevated at 10.4.  Patient indicates that this is a lifestyle issue.  Recently lost 2 dogs and felt upset started eating foods that he knows he should not.  Is now back on a solid diabetic release out diet expect that his blood sugars should improve.  Lab Results   Component Value Date    HGBA1C 10.4 (H) 04/06/2025

## 2025-04-15 NOTE — ASSESSMENT & PLAN NOTE
Acute pain right shoulder seems to occur after surgery on right hand for cellulitis.  May have been positional examination does not show any limitation range of motion or any tenderness in the musculature.  Check x-ray for possible underlying osteoarthritis    Orders:    XR shoulder 2+ vw right; Future

## 2025-04-15 NOTE — LETTER
2nd Request      Diabetic Eye Exam Form    Date Requested: 25  Patient: Yoni Castillo  Patient : 1965   Referring Provider: Judd Ji MD      DIABETIC Eye Exam Date _______________________________      Type of Exam MUST be documented for Diabetic Eye Exams. Please CHECK ONE.     Retinal Exam       Dilated Retinal Exam       OCT       Optomap-Iris Exam      Fundus Photography       Left Eye - Please check Retinopathy or No Retinopathy        Exam did show retinopathy    Exam did not show retinopathy       Right Eye - Please check Retinopathy or No Retinopathy       Exam did show retinopathy    Exam did not show retinopathy       Comments __________________________________________________________    Practice Providing Exam ______________________________________________    Exam Performed By (print name) _______________________________________      Provider Signature ___________________________________________________      These reports are needed for  compliance.    Please fax this completed form and a copy of the Diabetic Eye Exam report   to the [facility] Department as soon as possible via   Fax [phone] attention Skyla: Phone [phone].   Our office located at [address] [place] [address]     We thank you for your assistance in treating our mutual patient.

## 2025-04-15 NOTE — TELEPHONE ENCOUNTER
Upon review of the In Basket request and the patient's chart, initial outreach has been made via fax to facility. Please see Contacts section for details.     Thank you  Skyla Sands MA

## 2025-04-16 NOTE — TELEPHONE ENCOUNTER
"Post Op Note    Yoni Castillo is a 60 y.o. male s/p Right percutaneous nephrolithotomy, greater than 4 cm of stone burden performed 04/11/2025.  Yoni Castillo is a patient of Dr. Hamilton and is seen at the [place] office.     How would you rate your pain on a scale from 1 to 10, 10 being the worst pain ever? 0  Have you had a fever? No    Have your bowel movements been regular? Yes  Do you have any difficulty urinating? No    Do you have any other questions or concerns that I can address at this time?   Called and spoke with the patient who states he is feeling \" great'  Patient is elated to be \" tubeless\"    Patient reports he had gross hematuria this past weekend, no other symptoms.  Urine clear since Monday.  Patient saw his PCP today who ordered micro to reflex. Advised the patient added urine culture order too. Patient will go the lab tomorrow.    Patient's 3 month follow up scheduled 7/22/2025 with Dr Hamilton at the [facility] office.  Patient requesting our office to schedule USK for him.    Please schedule USK at [facility] for June 2025. Please call the patient.           "

## 2025-04-17 ENCOUNTER — OFFICE VISIT (OUTPATIENT)
Dept: OCCUPATIONAL THERAPY | Age: 60
End: 2025-04-17
Payer: MEDICARE

## 2025-04-17 DIAGNOSIS — M65.949 TENOSYNOVITIS OF FINGER AND HAND: Primary | ICD-10-CM

## 2025-04-17 DIAGNOSIS — M25.631 WRIST STIFFNESS, RIGHT: ICD-10-CM

## 2025-04-17 DIAGNOSIS — Z47.89 AFTERCARE FOLLOWING SURGERY OF THE MUSCULOSKELETAL SYSTEM: ICD-10-CM

## 2025-04-17 DIAGNOSIS — L08.9 ANIMAL BITE OF LEFT HAND WITH INFECTION, SUBSEQUENT ENCOUNTER: ICD-10-CM

## 2025-04-17 DIAGNOSIS — M77.8 RIGHT WRIST TENDONITIS: ICD-10-CM

## 2025-04-17 DIAGNOSIS — M25.641 STIFFNESS OF FINGER JOINT OF RIGHT HAND: ICD-10-CM

## 2025-04-17 DIAGNOSIS — S61.452D ANIMAL BITE OF LEFT HAND WITH INFECTION, SUBSEQUENT ENCOUNTER: ICD-10-CM

## 2025-04-17 PROCEDURE — 97140 MANUAL THERAPY 1/> REGIONS: CPT

## 2025-04-17 PROCEDURE — 97110 THERAPEUTIC EXERCISES: CPT

## 2025-04-17 NOTE — TELEPHONE ENCOUNTER
Ultrasound scheduled at [facility] on 7/14/25 at 1:00.  Patient will need a full bladder fo ultrasound.  Message left for patient to call back for details.  When patient calls back please relay ultrasound appointment details.

## 2025-04-17 NOTE — PROGRESS NOTES
"Daily Note     Today's date: 2025  Patient name: Yoni Castillo  : 1965  MRN: [id number]  Referring provider: Yesenia Carrasquillo PA-C  Dx:   Encounter Diagnosis     ICD-10-CM    1. Tenosynovitis of finger and hand  M65.949       2. Animal bite of left hand with infection, subsequent encounter  S61.452D     L08.9       3. Aftercare following surgery of the musculoskeletal system  Z47.89       4. Right wrist tendonitis  M77.8       5. Wrist stiffness, right  M25.631       6. Stiffness of finger joint of right hand  M25.641           Start Time: 1400  Stop Time: 1445  Total time in clinic (min): 45 minutes    Subjective: Pt denies changes with the hand.       Objective: See treatment diary below    Assessment: Tolerated treatment well. PROM continues to improve with the fingers. Still with significant extrinsic stiffness. Progressed strengthening with good tolerance. Patient would benefit from continued OT      Plan: Continue per plan of care.  Progress treatment as tolerated.       Precautions: 3/24 FCR and FCU debridement and tenosynovectomy     Manuals  3/27  3/31  4/3  4/10  4/14 4/17        Edema mob    10'  10'  10'  10' 10'       Scar mob              STM    5'  5'  5'  5' 5'                                             Ther Ex                       Forearm AROM 2x20  2x20  2x20  2x20  2x20 2x20       Wrist P/AROM 2x20 2x20 2x20 2x20  2x20 2x20       Tendon glides 2x20 2x20 2x20 2x20  2x20 2x20       Thumb P/AROM X20 all planes X20 all planes X20 all planes X20 all planes  X20 all planes X20 all planes       Ball stretch      10x10s 10x10s       Strengthening   Puttycise \"L\" tool 10' Puttycise \"L\" tool 10' Puttycise \"L\" tool 10' Puttycise \"L\" tool 4lb dumbbell press and drag    Flex bar squeeze                                                                                                                  Ther Activity                   Grasp/release                                                        "                                                              Neuro Re-Ed                                                               Ortho Fit                                                               Modalities                      5' 5'  5'  5' 5'

## 2025-04-17 NOTE — TELEPHONE ENCOUNTER
Pt returning call and message relayed. Pt confirm appt for US at [facility] on 7/14/25 @ 1pm. No further questions or concerns at this time.

## 2025-04-20 DIAGNOSIS — M79.605 LEFT LEG PAIN: ICD-10-CM

## 2025-04-21 ENCOUNTER — OFFICE VISIT (OUTPATIENT)
Dept: OCCUPATIONAL THERAPY | Age: 60
End: 2025-04-21
Payer: MEDICARE

## 2025-04-21 DIAGNOSIS — Z47.89 AFTERCARE FOLLOWING SURGERY OF THE MUSCULOSKELETAL SYSTEM: ICD-10-CM

## 2025-04-21 DIAGNOSIS — L08.9 ANIMAL BITE OF LEFT HAND WITH INFECTION, SUBSEQUENT ENCOUNTER: ICD-10-CM

## 2025-04-21 DIAGNOSIS — M25.641 STIFFNESS OF FINGER JOINT OF RIGHT HAND: ICD-10-CM

## 2025-04-21 DIAGNOSIS — M25.631 WRIST STIFFNESS, RIGHT: ICD-10-CM

## 2025-04-21 DIAGNOSIS — M77.8 RIGHT WRIST TENDONITIS: ICD-10-CM

## 2025-04-21 DIAGNOSIS — S61.452D ANIMAL BITE OF LEFT HAND WITH INFECTION, SUBSEQUENT ENCOUNTER: ICD-10-CM

## 2025-04-21 DIAGNOSIS — M65.949 TENOSYNOVITIS OF FINGER AND HAND: Primary | ICD-10-CM

## 2025-04-21 LAB
COLOR STONE: NORMAL
SIZE STONE: NORMAL MM
SPEC SOURCE SUBJ: NORMAL
STONE ANALYSIS-IMP: NORMAL
URATE MFR STONE: 100 %
WT STONE: 5150 MG

## 2025-04-21 PROCEDURE — 97530 THERAPEUTIC ACTIVITIES: CPT

## 2025-04-21 PROCEDURE — 97110 THERAPEUTIC EXERCISES: CPT

## 2025-04-21 PROCEDURE — 97140 MANUAL THERAPY 1/> REGIONS: CPT

## 2025-04-21 RX ORDER — TRAMADOL HYDROCHLORIDE 50 MG/1
100 TABLET ORAL
Qty: 60 TABLET | Refills: 0 | Status: SHIPPED | OUTPATIENT
Start: 2025-04-21

## 2025-04-21 NOTE — PROGRESS NOTES
"Daily Note     Today's date: 2025  Patient name: Yoni Castillo  : 1965  MRN: [id number]  Referring provider: Yesenia Carrasquillo PA-C  Dx:   Encounter Diagnosis     ICD-10-CM    1. Tenosynovitis of finger and hand  M65.949       2. Animal bite of left hand with infection, subsequent encounter  S61.452D     L08.9       3. Aftercare following surgery of the musculoskeletal system  Z47.89       4. Right wrist tendonitis  M77.8       5. Wrist stiffness, right  M25.631       6. Stiffness of finger joint of right hand  M25.641           Start Time: 1400  Stop Time: 1445  Total time in clinic (min): 45 minutes    Subjective: \"My right hand feels less than my left hand.\"       Objective: See treatment diary below    Composite fist AROM (DPC):  Index: 1 cm  Lon cm  Ring 2 cm  Small: 2.5 cm    Assessment: Tolerated treatment well. Pt's passive motion continues to improve. Pt able to make a near full fist at this time. Finger tips 1-2 cm from palm with composite fist. Patient would benefit from continued OT      Plan: Continue per plan of care.  Progress treatment as tolerated.       Precautions: 3/24 FCR and FCU debridement and tenosynovectomy     Manuals  3/27  3/31  4/3  4/10  4/14 4/17 4/21       Edema mob    10'  10'  10'  10' 10' 10'      Scar mob              STM    5'  5'  5'  5' 5' 5'                                            Ther Ex                       Forearm AROM 2x20  2x20  2x20  2x20  2x20 2x20 2x20      Wrist P/AROM 2x20 2x20 2x20 2x20  2x20 2x20 2x20      Tendon glides 2x20 2x20 2x20 2x20  2x20 2x20 2x20      Thumb P/AROM X20 all planes X20 all planes X20 all planes X20 all planes  X20 all planes X20 all planes X20 all planes      Ball stretch      10x10s 10x10s 10x10s      Strengthening   Puttycise \"L\" tool 10' Puttycise \"L\" tool 10' Puttycise \"L\" tool 10' Puttycise \"L\" tool 4lb dumbbell press and drag    Flex bar squeeze     Yellow putty press with small dowel.                                  "                                                                             Ther Activity                   Grasp/release            Putty cutting/rolling with pizza cutter                                                                                                         Neuro Re-Ed                                                               Ortho Fit                                                               Modalities                  HP    5' 5'  5'  5' 5' 5'

## 2025-04-22 NOTE — TELEPHONE ENCOUNTER
As a follow-up, a second attempt has been made for outreach via fax to facility. Please see Contacts section for details.    Thank you  Skyla Sands MA

## 2025-04-23 NOTE — PATIENT INSTRUCTIONS
Additional Testing:    -Sleep study to check for sleep apnea   -I am recommending further Neurodiagnostic workup at this time:  MRI Brain scheduled    Headache/migraine treatment:    Prevention: To take every day to help prevent headaches - not to take at the time of headache:  -Continue amitriptyline 30mg nightly.  You may increase by 10mg weekly until you reach good headache control or you reach a max dosage of 50mg nightly.  Remain on the lowest effective dose      -Over the counter preventive supplements for headaches/migraines (if you try, try for 3 months straight):  -Magnesium 400mg daily (If any diarrhea or upset stomach, decrease dose as tolerated)  -Riboflavin (Vitamin B2) 400mg daily (may make your urine bright/neon yellow)  - All supplements can be purchased online    Abortive: For immediate treatment of a headache/migraine:  -Continue Ubrelvy 100mg at the earliest onset of a headache.  May repeat again in 2 hours if not completely headache free.  No more than 2 doses in 24 hours  -It is ok to take ibuprofen, acetaminophen or naproxen (Advil, Tylenol,  Aleve, Excedrin) if they help your headaches you should limit these to No more than 3 times a week to avoid medication overuse/rebound headaches.     Lifestyle Recommendations:  -Remain well-hydrated drinking at least 48 to 64 ounces of noncaffeinated beverages per day in addition to anything caffeinated.    -It is important to eat meals throughout the day and not go long periods of time between eating.  -Getting adequate rest is also very important for migraine prevention (aim for 7-8 hours per night).     -Regular exercise is also beneficial for headache prevention.  I would encourage at the least 5 days of physical exercise weekly for at least 30 minutes.   -I would like for them to keep track of their migraines using an application on their phone or calendar as they see fit. Phone applications: Migraine Angel or Migraine Diary.    Education and  Follow-up:  -Please call or send a [employer] message with any questions or concerns. Please present to the emergency room with any concerning symptoms such as: worst headache of your life, sudden painless loss of vision or double vision, difficulty speaking or swallowing, vertigo/room spinning that does not quickly resolve, or weakness/numbness/loss of coordination affecting 1 side of the face or body.  -Follow up in 4 months or sooner if needed.

## 2025-04-23 NOTE — PROGRESS NOTES
Name: Yoni Castillo      : 1965      MRN: [id number]  Encounter Provider: JAN Cruz  Encounter Date: 2025   Encounter department: [facility] [place]  :  Assessment & Plan  Chronic migraine without aura without status migrainosus, not intractable  Since his last visit, we started amitriptyline and he is up to 30mg nightly. He is sleeping better and has much better control of his migraines.  He went from 2-3 headaches weekly to about 1 per month.  Ubrelvy has been effective for him when he needs it. He denies any new associated symptoms with his headaches.  He is scheduled for the brain MRI that was ordered at the last visit in early may and he has also scheduled his sleep study to assess for sleep apnea which I suspect that he might have.   Workup:  Due to increased frequency and severity of headaches and migraines I recommend further evaluation with MRI brain without contrast to rule out structural or treatable causes of symptoms. He reports needing anesthesia to undergo an MRI secondary to pain and claustrophobia  Sleep study to assess for JEWEL  Preventative:  We discussed headache hygiene and lifestyle factors that may improve headaches  Continue amitriptyline 30mg nightly    Currently on through other providers: carvedilol, lisinopril-hydrochlorothiazide   Past/ failed/contraindicated: propranolol (on other BP meds), topamax (kidney stones)  CGRP med, botox  Acute:  Discussed not taking over-the-counter or prescription pain medications more than 3 days per week to prevent medication overuse/rebound headache  Continue Ubrelvy 100mg at the earliest onset of a migraine.  May repeat again in 2 hours if not completely headache free. No more than 2 tabs in 24 hours  Currently on through other providers: robaxin, tramadol   Past/ failed/contraindicated: tylenol, triptans (HTN)  Future options:  reyvow, nurtec, zavzpret  Orders:    Ubrogepant (UBRELVY) 100 MG tablet;  Take 1 tablet (100 mg) one time as needed for migraine. May repeat one additional tablet (100 mg) at least two hours after the first dose. Do not use more than two doses per day, or for more than twelve days per month.    amitriptyline (ELAVIL) 10 mg tablet; 30mg at bedtime    Cervical radiculopathy    Orders:    Ambulatory referral to Neurosurgery; Future          History of Present Illness     We had the pleasure of evaluating Edward in neurological follow-up today. He is a 60 y.o. year-old male who presents today for evaluation of headaches.     Consult with me 2/13/25: He reports history of migraine headaches for about 40 years now but they have worsened in frequency and intensity in the past few months.  He reports increase in stress recently with his dogs passing away and his mother in law being sick in the hospital. He is scheduled for an upcoming surgery with urology for nephrolithotomy for a large kidney stone.  He reports never being worked up or treated for his headaches in the past.  He did see someone from pain management in regards to his chronic neck and back pain who recommended injections but he prefers to avoid them if possible.  He reports that the pain he experiences migrates but that it tends to be in the temporal and occipital areas.  He denies aura but does describe seeing stars in his vision which can change sizes and colors that are only present when his eyes are closed and doesn't necessarily correlate with headaches.  Headaches are made worse with movement and he will retreat to a dark quiet room when he experiences the headaches.      Interval history as of 4/24/25:  Since his last visit, we started amitriptyline and he is up to 30mg nightly. He is sleeping better and has much better control of his migraines.  He went from 2-3 headaches weekly to about 1 per month.  Ubrelvy has been effective for him when he needs it. He denies any new associated symptoms with his headaches.  He is  scheduled for the brain MRI that was ordered at the last visit in early may and he has also scheduled his sleep study to assess for sleep apnea which I suspect that he might have.     He continues to experience difficulty swallowing food. He does note a correlation between when his neck is hurting him and when he has an increase in swallowing difficulties. He has not discussed this with his PCP yet and has not had a swallow study or EGD recently to further evaluate the cause of his symptoms.  He follows with pain management (Dr. Pederson) for his ongoing neck pain, back pain and radiculopathies.  They have recommended ESIs since he has not had any benefit from conservative treatment but he is not interested in a temporary fix.  He was not open to discussing surgery initially but he would like to have a consultation with a surgeon to discuss any surgical options at this time since his symptoms have been worsening.        Headaches started at what age? 20s years old  How often do the headaches occur? 2-3x per week. Now, 1 per month  What time of the day do the headaches start?  No particular time of day   How long do the headaches last? 3-4 hours  Are you ever headache free? Yes    Aura? without aura     Last eye exam: several eye surgeries in the past- cataract surgeries     Where is your headache located and pain quality? Bilateral temples and bilateral occiput.  Typically unilateral but switches sides  What is the intensity of pain? Average: 5-6/10, worst 10/10    Associated symptoms:   [x] Nausea       [x] Vomiting        [] Diarrhea  [x] Insomnia    [x] Stiff or sore neck   [] Problems with concentration  [x] Photophobia     [x]Phonophobia      [] Osmophobia  [] Blurred vision   [] Prefer quiet, dark room  [] Light-headed or dizzy     [] Tinnitus   [] Hands or feet tingle or feel numb/paresthesias    [] Ptosis      [] Facial droop  [x] Lacrimation  [] Nasal congestion/rhinorrhea   [] Flushing of face    Things that  "make the headache worse? Any movement    Headache triggers:  stress, weather changes, sunlight    Have you seen someone else for headaches or pain? Yes, Dr. Salcedo (pain mgmt for neck and back)  Have you had trigger point injection performed and how often? No  Have you had Botox injection performed and how often? No   Have you had epidural injections or transforaminal injections performed? No  Have you ever had any Brain imaging? yes [facility] 2022    LIFESTYLE  Sleep   Averages: 1.5-2 hours at a time. Tries to shoot for a total of 8 hours but rarely hits that goal  Problems falling asleep?: No  Problems staying asleep?: Yes  Do you snore while asleep? Yes  Do you wake up with headaches? Yes  Ever evaluated for sleep apnea? \"A long time ago\" Sleep study is ordered for patient to complete  Physical activity: none regularly  Water: 80-100oz per day  Caffeine: 40oz daily strong coffee per day  Mood: reports having some anxiety and depression but he denies any SI/HI  Pertinent family history:  Family history of headaches:  no known family members with significant headaches  Any family history of aneurysms - No    Pertinent social history:  Work: disabled   Lives with lives with their spouse  Illicit Drugs: denies  Alcohol/tobacco: Denies alcohol use, Denies tobacco use     What medications do you take or have you taken for your headaches?:    ABORTIVE:    OTC medications: tylenol (ineffective)  Prescription: ubrelvy (effective)  Medications from other providers: robaxin, tramadol  Past/failed/contraindicated: none    PREVENTIVE:   OTC medications: none  Prescription: amitriptyline 30mg (effective)  Medications from other providers: carvedilol, lisinopril-hydrochlorothiazide   Past/failed/contraindicated: propranolol (on other BP meds), topamax (kidney stones)    Alternative therapies used in the past for headaches?   Rest, dark room      Review of Systems   Constitutional:  Negative for appetite change, fatigue and fever. "   HENT: Negative.  Negative for hearing loss, tinnitus, trouble swallowing and voice change.    Eyes: Negative.  Negative for photophobia, pain and visual disturbance.   Respiratory: Negative.  Negative for shortness of breath.    Cardiovascular: Negative.  Negative for palpitations.   Gastrointestinal: Negative.  Negative for nausea and vomiting.   Endocrine: Negative.  Negative for cold intolerance.   Genitourinary: Negative.  Negative for dysuria, frequency and urgency.   Musculoskeletal:  Negative for back pain, gait problem, myalgias, neck pain and neck stiffness.   Skin: Negative.  Negative for rash.   Allergic/Immunologic: Negative.    Neurological:  Negative for dizziness, tremors, seizures, syncope, facial asymmetry, speech difficulty, weakness, light-headedness, numbness and headaches.   Hematological: Negative.  Does not bruise/bleed easily.   Psychiatric/Behavioral: Negative.  Negative for confusion, hallucinations and sleep disturbance.      I have personally reviewed the MA's review of systems and made changes as necessary.    Current Outpatient Medications on File Prior to Visit   Medication Sig Dispense Refill    Accu-Chek FastClix Lancets MISC Use to test blood sugar once a day 102 each 0    acetaminophen (TYLENOL) 500 mg tablet Take 2 tablets (1,000 mg total) by mouth every 8 (eight) hours 60 tablet 0    ammonium lactate (LAC-HYDRIN) 12 % lotion 2 (two) times a day as needed      Blood Glucose Monitoring Suppl (Accu-Chek Guide Me) w/Device KIT Use to check blood sugar once a day 1 kit 0    carvedilol (COREG) 25 mg tablet Take 1 tablet (25 mg total) by mouth 2 (two) times a day with meals 200 tablet 1    Empagliflozin (Jardiance) 25 MG TABS Take 1 tablet (25 mg total) by mouth daily 90 tablet 1    glucose blood (Accu-Chek Guide) test strip Use to check blood sugar once a day 100 strip 0    ketoconazole (NIZORAL) 2 % cream if needed      lidocaine (Lidoderm) 5 % Apply 1 patch topically over 12 hours  daily Remove & Discard patch within 12 hours or as directed by MD 30 patch 0    lisinopril-hydrochlorothiazide (PRINZIDE,ZESTORETIC) 20-12.5 MG per tablet Take 1 tablet by mouth 2 (two) times a day 200 tablet 1    methocarbamol (ROBAXIN) 750 mg tablet Take 1 tablet (750 mg total) by mouth daily as needed for muscle spasms 30 tablet 0    traMADol (Ultram) 50 mg tablet Take 2 tablets (100 mg total) by mouth daily at bedtime as needed for severe pain 60 tablet 0    [DISCONTINUED] amitriptyline (ELAVIL) 10 mg tablet start 10mg at bedtime. Increase by 10mg each week until good effect on headaches/pain or reach 50mg daily 150 tablet 1    [DISCONTINUED] Ubrogepant (UBRELVY) 100 MG tablet Take 1 tablet (100 mg) one time as needed for migraine. May repeat one additional tablet (100 mg) at least two hours after the first dose. Do not use more than two doses per day, or for more than twelve days per month. 12 tablet 3    celecoxib (CeleBREX) 200 mg capsule Take 1 capsule (200 mg total) by mouth 2 (two) times a day for 5 days 10 capsule 0    Insulin Pen Needle (BD Pen Needle [place] U/F) 32G X 4 MM MISC Use in the morning 100 each 1     No current facility-administered medications on file prior to visit.      Social History     Tobacco Use    Smoking status: Never    Smokeless tobacco: Never   Vaping Use    Vaping status: Never Used   Substance and Sexual Activity    Alcohol use: Yes     Alcohol/week: 1.0 standard drink of alcohol     Types: 1 Cans of beer per week     Comment: ocassional    Drug use: Never    Sexual activity: Not Currently     Partners: Female     Birth control/protection: Abstinence     Objective   /82 (BP Location: Left arm, Patient Position: Sitting, Cuff Size: Extra-Large)     On neurological examination the patient was awake, alert, attentive, oriented to person, place, and time. Recent and remote memory intact to conversation with no evidence of language dysfunction. Satisfactory fund of knowledge.  Normal attention span and concentration.  Mood, affect and judgement are appropriate. Speech is fluent without dysarthria or aphasia. Face appears symmetric, with no obvious weakness noted.  Audition is intact to casual conversation.  Eye movements are intact.  Able to move bilateral upper extremities antigravity without difficulty.      Labs:  Lab Results   Component Value Date    HGBA1C 10.4 (H) 04/06/2025     Lab Results   Component Value Date    WBC 4.22 (L) 04/12/2025    HGB 12.8 04/12/2025    HCT 38.5 04/12/2025    MCV 91 04/12/2025     04/12/2025     Lab Results   Component Value Date    SODIUM 131 (L) 04/12/2025    K 4.6 04/12/2025     04/12/2025    CO2 26 04/12/2025    AGAP 5 04/12/2025    BUN 24 04/12/2025    CREATININE 1.20 04/12/2025    GLUC 171 (H) 04/12/2025    GLUF 171 (H) 04/12/2025    CALCIUM 8.4 04/12/2025    AST 14 04/06/2025    ALT 15 04/06/2025    ALKPHOS 64 04/06/2025    TP 7.7 04/06/2025    TBILI 0.37 04/06/2025    EGFR 64 04/12/2025     Radiology Results Review:   5/7/25 MRI Brain: Scheduled  2/16/22 CTH: No acute intracranial abnormality.     Administrative Statements   I have spent a total time of 35 minutes in caring for this patient on the day of the visit/encounter including Diagnostic results, Prognosis, Risks and benefits of tx options, Instructions for management, Patient and family education, Importance of tx compliance, Risk factor reductions, Impressions, Counseling / Coordination of care, Documenting in the medical record, Reviewing / ordering tests, medicine, procedures  , and Obtaining or reviewing history  .

## 2025-04-24 ENCOUNTER — OFFICE VISIT (OUTPATIENT)
Dept: NEUROLOGY | Facility: CLINIC | Age: 60
End: 2025-04-24
Payer: MEDICARE

## 2025-04-24 ENCOUNTER — OFFICE VISIT (OUTPATIENT)
Dept: OCCUPATIONAL THERAPY | Age: 60
End: 2025-04-24
Payer: MEDICARE

## 2025-04-24 VITALS — DIASTOLIC BLOOD PRESSURE: 82 MMHG | SYSTOLIC BLOOD PRESSURE: 160 MMHG

## 2025-04-24 DIAGNOSIS — M54.12 CERVICAL RADICULOPATHY: ICD-10-CM

## 2025-04-24 DIAGNOSIS — Z47.89 AFTERCARE FOLLOWING SURGERY OF THE MUSCULOSKELETAL SYSTEM: ICD-10-CM

## 2025-04-24 DIAGNOSIS — G43.709 CHRONIC MIGRAINE WITHOUT AURA WITHOUT STATUS MIGRAINOSUS, NOT INTRACTABLE: Primary | ICD-10-CM

## 2025-04-24 DIAGNOSIS — L08.9 ANIMAL BITE OF LEFT HAND WITH INFECTION, SUBSEQUENT ENCOUNTER: ICD-10-CM

## 2025-04-24 DIAGNOSIS — M25.631 WRIST STIFFNESS, RIGHT: ICD-10-CM

## 2025-04-24 DIAGNOSIS — M77.8 RIGHT WRIST TENDONITIS: ICD-10-CM

## 2025-04-24 DIAGNOSIS — M65.949 TENOSYNOVITIS OF FINGER AND HAND: Primary | ICD-10-CM

## 2025-04-24 DIAGNOSIS — M25.641 STIFFNESS OF FINGER JOINT OF RIGHT HAND: ICD-10-CM

## 2025-04-24 DIAGNOSIS — S61.452D ANIMAL BITE OF LEFT HAND WITH INFECTION, SUBSEQUENT ENCOUNTER: ICD-10-CM

## 2025-04-24 PROCEDURE — 97140 MANUAL THERAPY 1/> REGIONS: CPT

## 2025-04-24 PROCEDURE — 99214 OFFICE O/P EST MOD 30 MIN: CPT

## 2025-04-24 PROCEDURE — 97110 THERAPEUTIC EXERCISES: CPT

## 2025-04-24 RX ORDER — AMITRIPTYLINE HYDROCHLORIDE 10 MG/1
TABLET ORAL
Qty: 90 TABLET | Refills: 3 | Status: SHIPPED | OUTPATIENT
Start: 2025-04-24

## 2025-04-24 NOTE — PROGRESS NOTES
"Daily Note     Today's date: 2025  Patient name: Yoni Castillo  : 1965  MRN: [id number]  Referring provider: Yesenia Carrasquillo PA-C  Dx:   Encounter Diagnosis     ICD-10-CM    1. Tenosynovitis of finger and hand  M65.949       2. Animal bite of left hand with infection, subsequent encounter  S61.452D     L08.9       3. Aftercare following surgery of the musculoskeletal system  Z47.89       4. Right wrist tendonitis  M77.8       5. Wrist stiffness, right  M25.631       6. Stiffness of finger joint of right hand  M25.641           Start Time: 1522  Stop Time: 1607  Total time in clinic (min): 45 minutes    Subjective: \"It's doing better.\"       Objective: See treatment diary below      Assessment: Tolerated treatment well. Pt noticing improved AROM with his wrist and fingers. He is planning on using it to pick weeds this weekend. Pt tolerated progression with strengthening. Patient would benefit from continued OT      Plan: Continue per plan of care.  Progress treatment as tolerated.       Precautions: 3/24 FCR and FCU debridement and tenosynovectomy     Manuals  3/27  3/31  4/3  4/10  4/14 4/17 4/21 4/24      Edema mob    10'  10'  10'  10' 10' 10' 10'     Scar mob              STM    5'  5'  5'  5' 5' 5' 5'                                           Ther Ex                       Forearm AROM 2x20  2x20  2x20  2x20  2x20 2x20 2x20 2x20     Wrist P/AROM 2x20 2x20 2x20 2x20  2x20 2x20 2x20 2x20     Tendon glides 2x20 2x20 2x20 2x20  2x20 2x20 2x20 2x20     Thumb P/AROM X20 all planes X20 all planes X20 all planes X20 all planes  X20 all planes X20 all planes X20 all planes X20 all planes     Ball stretch      10x10s 10x10s 10x10s      Strengthening   Puttycise \"L\" tool 10' Puttycise \"L\" tool 10' Puttycise \"L\" tool 10' Puttycise \"L\" tool 4lb dumbbell press and drag    Flex bar squeeze     Yellow putty press with small dowel.  Green pinch clips around digit web x20    Putty press with 4lb dumbbell and small " dowel    Wrist twist bar 2x20                                                                                                            Ther Activity                   Grasp/release            Putty cutting/rolling with pizza cutter                                                                                                         Neuro Re-Ed                                                               Ortho Fit                                                               Modalities                  HP    5' 5'  5'  5' 5' 5' 5'

## 2025-04-24 NOTE — PROGRESS NOTES
Name: Yoni Castillo      : 1965      MRN: [id number]  Encounter Provider: JAN Cruz  Encounter Date: 2025   Encounter department: [facility]  :  Assessment & Plan      {Ambulatory Patient Instructions (Optional):91313}    History of Present Illness {?Quick Links Encounters * My Last Note * Last Note in Specialty * Snapshot * Since Last Visit * History :10644}  Migraine  Pertinent negatives include no back pain, dizziness, eye pain, fever, hearing loss, nausea, neck pain, numbness, photophobia, seizures, tinnitus, vomiting or weakness.      Review of Systems   Constitutional:  Negative for appetite change, fatigue and fever.   HENT: Negative.  Negative for hearing loss, tinnitus, trouble swallowing and voice change.    Eyes: Negative.  Negative for photophobia, pain and visual disturbance.   Respiratory: Negative.  Negative for shortness of breath.    Cardiovascular: Negative.  Negative for palpitations.   Gastrointestinal: Negative.  Negative for nausea and vomiting.   Endocrine: Negative.  Negative for cold intolerance.   Genitourinary: Negative.  Negative for dysuria, frequency and urgency.   Musculoskeletal:  Negative for back pain, gait problem, myalgias, neck pain and neck stiffness.   Skin: Negative.  Negative for rash.   Allergic/Immunologic: Negative.    Neurological:  Negative for dizziness, tremors, seizures, syncope, facial asymmetry, speech difficulty, weakness, light-headedness, numbness and headaches.   Hematological: Negative.  Does not bruise/bleed easily.   Psychiatric/Behavioral: Negative.  Negative for confusion, hallucinations and sleep disturbance.     I have personally reviewed the MA's review of systems and made changes as necessary.    {Select to insert medical history sections (Optional):55279}     Objective {?Quick Links Trend Vitals * Enter New Vitals * Results Review * Timeline (Adult) * Labs * Imaging * Cardiology * Procedures *  Lung Cancer Screening * Surgical eConsent :17202}  There were no vitals taken for this visit.    Physical Exam  Neurological Exam    {Radiology Results Review (Optional):58157}    {Administrative / Billing Section (Optional):04923}

## 2025-04-28 ENCOUNTER — OFFICE VISIT (OUTPATIENT)
Dept: OCCUPATIONAL THERAPY | Age: 60
End: 2025-04-28
Payer: MEDICARE

## 2025-04-28 ENCOUNTER — APPOINTMENT (OUTPATIENT)
Dept: LAB | Facility: HOSPITAL | Age: 60
End: 2025-04-28
Payer: MEDICARE

## 2025-04-28 ENCOUNTER — HOSPITAL ENCOUNTER (OUTPATIENT)
Dept: RADIOLOGY | Facility: HOSPITAL | Age: 60
Discharge: HOME | End: 2025-04-28
Payer: MEDICARE

## 2025-04-28 DIAGNOSIS — R39.9 UTI SYMPTOMS: ICD-10-CM

## 2025-04-28 DIAGNOSIS — S61.452D ANIMAL BITE OF LEFT HAND WITH INFECTION, SUBSEQUENT ENCOUNTER: ICD-10-CM

## 2025-04-28 DIAGNOSIS — L08.9 ANIMAL BITE OF LEFT HAND WITH INFECTION, SUBSEQUENT ENCOUNTER: ICD-10-CM

## 2025-04-28 DIAGNOSIS — M25.631 WRIST STIFFNESS, RIGHT: ICD-10-CM

## 2025-04-28 DIAGNOSIS — M25.511 ACUTE PAIN OF RIGHT SHOULDER: ICD-10-CM

## 2025-04-28 DIAGNOSIS — R31.0 GROSS HEMATURIA: ICD-10-CM

## 2025-04-28 DIAGNOSIS — M25.641 STIFFNESS OF FINGER JOINT OF RIGHT HAND: ICD-10-CM

## 2025-04-28 DIAGNOSIS — Z47.89 AFTERCARE FOLLOWING SURGERY OF THE MUSCULOSKELETAL SYSTEM: ICD-10-CM

## 2025-04-28 DIAGNOSIS — M77.8 RIGHT WRIST TENDONITIS: ICD-10-CM

## 2025-04-28 DIAGNOSIS — M65.949 TENOSYNOVITIS OF FINGER AND HAND: Primary | ICD-10-CM

## 2025-04-28 LAB
BACTERIA UR QL AUTO: ABNORMAL /HPF
BILIRUB UR QL STRIP: NEGATIVE
CLARITY UR: CLEAR
COLOR UR: ABNORMAL
GLUCOSE UR STRIP-MCNC: ABNORMAL MG/DL
HGB UR QL STRIP.AUTO: ABNORMAL
HYALINE CASTS #/AREA URNS LPF: ABNORMAL /LPF
KETONES UR STRIP-MCNC: NEGATIVE MG/DL
LEUKOCYTE ESTERASE UR QL STRIP: ABNORMAL
NITRITE UR QL STRIP: NEGATIVE
NON-SQ EPI CELLS URNS QL MICRO: ABNORMAL /HPF
PH UR STRIP.AUTO: 5 [PH]
PROT UR STRIP-MCNC: NEGATIVE MG/DL
RBC #/AREA URNS AUTO: ABNORMAL /HPF
SP GR UR STRIP.AUTO: 1.02 (ref 1–1.03)
UROBILINOGEN UR STRIP-ACNC: <2 MG/DL
WBC #/AREA URNS AUTO: ABNORMAL /HPF

## 2025-04-28 PROCEDURE — 97110 THERAPEUTIC EXERCISES: CPT

## 2025-04-28 PROCEDURE — 97140 MANUAL THERAPY 1/> REGIONS: CPT

## 2025-04-28 PROCEDURE — 87147 CULTURE TYPE IMMUNOLOGIC: CPT

## 2025-04-28 PROCEDURE — 87086 URINE CULTURE/COLONY COUNT: CPT

## 2025-04-28 PROCEDURE — 81001 URINALYSIS AUTO W/SCOPE: CPT

## 2025-04-28 PROCEDURE — 73030 X-RAY EXAM OF SHOULDER: CPT

## 2025-04-28 NOTE — PROGRESS NOTES
"Daily Note     Today's date: 2025  Patient name: Yoni Castillo  : 1965  MRN: [id number]  Referring provider: Yesenia Carrasquillo PA-C  Dx:   Encounter Diagnosis     ICD-10-CM    1. Tenosynovitis of finger and hand  M65.949       2. Animal bite of left hand with infection, subsequent encounter  S61.452D     L08.9       3. Stiffness of finger joint of right hand  M25.641       4. Wrist stiffness, right  M25.631       5. Right wrist tendonitis  M77.8       6. Aftercare following surgery of the musculoskeletal system  Z47.89             Start Time: 1400  Stop Time: 1445  Total time in clinic (min): 45 minutes    Subjective: \"I'm using it more.\"       Objective: See treatment diary below      Assessment: Tolerated treatment well. Pt reports improving use of his R hand. He can nearly make a full fist at this time. He tolerated a progression with strengthening with minimal discomfort. Requires frequent cues to implement rest breaks and perform exercises correctly. Patient would benefit from continued OT      Plan: Continue per plan of care.  Progress treatment as tolerated.       Precautions: 3/24 FCR and FCU debridement and tenosynovectomy     Manuals  3/27  3/31  4/3  4/10  4/14 4/17 4/21 4/24 4/28     Edema mob    10'  10'  10'  10' 10' 10' 10' 10'    Scar mob              STM    5'  5'  5'  5' 5' 5' 5' 5'                                          Ther Ex                       Forearm AROM 2x20  2x20  2x20  2x20  2x20 2x20 2x20 2x20 2x20    Wrist P/AROM 2x20 2x20 2x20 2x20  2x20 2x20 2x20 2x20 2x20    Tendon glides 2x20 2x20 2x20 2x20  2x20 2x20 2x20 2x20 2x20    Thumb P/AROM X20 all planes X20 all planes X20 all planes X20 all planes  X20 all planes X20 all planes X20 all planes X20 all planes X20 all planes    Ball stretch      10x10s 10x10s 10x10s      Strengthening   Puttycise \"L\" tool 10' Puttycise \"L\" tool 10' Puttycise \"L\" tool 10' Puttycise \"L\" tool 4lb dumbbell press and drag    Flex bar squeeze     " Yellow putty press with small dowel.  Green pinch clips around digit web x20    Putty press with 4lb dumbbell and small dowel    Wrist twist bar 2x20 Green pinch clip large peg placement    Wrist twist bar 2x20                                                                                                           Ther Activity                   Grasp/release            Putty cutting/rolling with pizza cutter                                                                                                         Neuro Re-Ed                                                               Ortho Fit                                                               Modalities                  HP    5' 5'  5'  5' 5' 5' 5' 5'

## 2025-04-29 LAB — BACTERIA UR CULT: ABNORMAL

## 2025-04-29 NOTE — TELEPHONE ENCOUNTER
As a final attempt, a third outreach has been made via telephone call to facility. Please see Contacts section for details. This encounter will be closed and completed by end of day. Should we receive the requested information because of previous outreach attempts, the requested patient's chart will be updated appropriately.     Thank you  Skyla Sands MA

## 2025-04-30 ENCOUNTER — RESULTS FOLLOW-UP (OUTPATIENT)
Age: 60
End: 2025-04-30

## 2025-05-01 ENCOUNTER — OFFICE VISIT (OUTPATIENT)
Dept: OCCUPATIONAL THERAPY | Age: 60
End: 2025-05-01
Payer: MEDICARE

## 2025-05-01 DIAGNOSIS — Z47.89 AFTERCARE FOLLOWING SURGERY OF THE MUSCULOSKELETAL SYSTEM: ICD-10-CM

## 2025-05-01 DIAGNOSIS — S61.452D ANIMAL BITE OF LEFT HAND WITH INFECTION, SUBSEQUENT ENCOUNTER: ICD-10-CM

## 2025-05-01 DIAGNOSIS — M77.8 RIGHT WRIST TENDONITIS: ICD-10-CM

## 2025-05-01 DIAGNOSIS — M25.641 STIFFNESS OF FINGER JOINT OF RIGHT HAND: ICD-10-CM

## 2025-05-01 DIAGNOSIS — M65.949 TENOSYNOVITIS OF FINGER AND HAND: Primary | ICD-10-CM

## 2025-05-01 DIAGNOSIS — L08.9 ANIMAL BITE OF LEFT HAND WITH INFECTION, SUBSEQUENT ENCOUNTER: ICD-10-CM

## 2025-05-01 DIAGNOSIS — M25.631 WRIST STIFFNESS, RIGHT: ICD-10-CM

## 2025-05-01 PROCEDURE — 97140 MANUAL THERAPY 1/> REGIONS: CPT

## 2025-05-01 PROCEDURE — 97168 OT RE-EVAL EST PLAN CARE: CPT

## 2025-05-01 PROCEDURE — 97110 THERAPEUTIC EXERCISES: CPT

## 2025-05-01 NOTE — PROGRESS NOTES
"OT Hold / Re-Evaluation    Today's date: 2025  Patient name: Yoni Castillo  : 1965  MRN: [id number]  Referring provider: Yesenia Carrasquillo PA-C  Dx:   Encounter Diagnosis     ICD-10-CM    1. Tenosynovitis of finger and hand  M65.949       2. Animal bite of left hand with infection, subsequent encounter  S61.452D     L08.9       3. Right wrist tendonitis  M77.8       4. Aftercare following surgery of the musculoskeletal system  Z47.89       5. Stiffness of finger joint of right hand  M25.641       6. Wrist stiffness, right  M25.631             Start Time: 1400  Stop Time: 1455  Total time in clinic (min): 55 minutes    Subjective: \"I think I can wait to see what the doctor says.\"       Objective: See treatment diary below    AROM      Elbow/Forearm    Right   Supination 60 (was 45)   Pronation 87      Wrist    Right   Flexion 42 (was 24)   Extension 48 (was 25)   Radial Dev. 15 (was 2)   Ulnar Dev. 32 (was 7)      Right Hand (ext/flex)    D2 D3 D4 D5   MCP 60 (was 40) 64 (was 50) 60 (was 45) 58 (was 40)   PIP 95 (was 76) 90 (was 78) 92 (was -14/84) 84 (was -14/70)   DIP 53 (was 45 55 (was 53) 32 (was 27) 50 (was 45)   Kapandji Score (Opposition) 5/10 (was 5/10)                  Thumb    Right   MP  42 (was 40)   IP 60 (was 57)        Strength  Right: 22.3  Left: 56.4      Assessment: Tolerated treatment well. Pt demonstrates improved AROM and strength over course of therapy. He can nearly make a full composite fist. He can use his R hand to perform nearly all daily activities. Patient will contact clinic if he wishes to add additional appointments.     Goals  Short term goals:  To be achieved within 2-3 visits from start of care on 25.   Patient will be independent with donning/doffing orthotic and report compliance with recommended wear and care instructions.    Patient will demonstrate independence with scar mobilization techniques and post-op wound care instructions.   Patient will demonstrate " independence with all AROM and stretching HEPs.  Patient will verbalize understanding of activity limitations within post-op precautions for improved symptom management.   Patient will verbalize understanding of activity modifications to maximize functional use of R hand throughout normal routine.   Patient will tolerate therapeutic exercises/activities with reports of pain <3/10.     ALL SHORT TERM GOALS MET     Long term goals:  To be achieved at time of discharge:   Patient will demonstrate improved AROM to WFL compared to uninvolved side. MET  Patient will begin to report decreased pain with completion of ADLs (donning shoes/dressing, etc.). MET  Patient will begin to report improved completion of IADLs (cooking, washing dishes, cleaning, etc.) with implementation of recommended symptom management strategies.  MET  Patient will begin to report improved participation/engagement in desired leisure occupations (model cars).   Patient will demonstrate improvements with  and pinch strength to within 25% of their uninvolved side for increased readiness to return to household chores/yard work. NOT MET  Patient will report readiness for discharge from OT services. NOT MET            Plan: Pt will contact clinic for additional visits PRN following his OV with Dr. Mccarthy     Precautions: 3/24 FCR and FCU debridement and tenosynovectomy     Manuals  3/27  3/31  4/3  4/10  4/14 4/17 4/21 4/24 4/28 5/1 re-eval    Edema mob    10'  10'  10'  10' 10' 10' 10' 10' 10'   Scar mob              STM    5'  5'  5'  5' 5' 5' 5' 5' 5'                                         Ther Ex                       Forearm AROM 2x20  2x20  2x20  2x20  2x20 2x20 2x20 2x20 2x20 2x20   Wrist P/AROM 2x20 2x20 2x20 2x20  2x20 2x20 2x20 2x20 2x20 2x20   Tendon glides 2x20 2x20 2x20 2x20  2x20 2x20 2x20 2x20 2x20 2x20   Thumb P/AROM X20 all planes X20 all planes X20 all planes X20 all planes  X20 all planes X20 all planes X20 all planes X20 all  "planes X20 all planes X20 all planes   Ball stretch      10x10s 10x10s 10x10s      Strengthening   Puttycise \"L\" tool 10' Puttycise \"L\" tool 10' Puttycise \"L\" tool 10' Puttycise \"L\" tool 4lb dumbbell press and drag    Flex bar squeeze     Yellow putty press with small dowel.  Green pinch clips around digit web x20    Putty press with 4lb dumbbell and small dowel    Wrist twist bar 2x20 Green pinch clip large peg placement    Wrist twist bar 2x20 Yellow putty squeeze/press/twist                                                                                                          Ther Activity                   Grasp/release            Putty cutting/rolling with pizza cutter                                                                                                         Neuro Re-Ed                                                               Ortho Fit                                                               Modalities                  HP    5' 5'  5'  5' 5' 5' 5' 5' 5'                               "

## 2025-05-06 ENCOUNTER — ANESTHESIA EVENT (OUTPATIENT)
Dept: RADIOLOGY | Facility: HOSPITAL | Age: 60
End: 2025-05-06
Payer: MEDICARE

## 2025-05-06 ENCOUNTER — OFFICE VISIT (OUTPATIENT)
Dept: OBGYN CLINIC | Facility: MEDICAL CENTER | Age: 60
End: 2025-05-06

## 2025-05-06 VITALS — BODY MASS INDEX: 38.19 KG/M2 | WEIGHT: 282 LBS | HEIGHT: 72 IN

## 2025-05-06 DIAGNOSIS — M77.8 RIGHT WRIST TENDONITIS: Primary | ICD-10-CM

## 2025-05-06 DIAGNOSIS — G56.23 CUBITAL TUNNEL SYNDROME, BILATERAL: ICD-10-CM

## 2025-05-06 PROCEDURE — 99024 POSTOP FOLLOW-UP VISIT: CPT | Performed by: ORTHOPAEDIC SURGERY

## 2025-05-06 NOTE — PROGRESS NOTES
"HAND & UPPER EXTREMITY OFFICE VISIT   Referred By:  No referring provider defined for this encounter.      Chief Complaint:     Right wrist pain     Surgery:  Surgery Date: 4/11/2025 - Synovectomy / Debridement - Flexor Carpi Radialis And Flexor Carpi Ulnaris Tendons- Right - Right     History of Present Illness:   Patient presents now 43 days status post the above surgery. he reports he is doing well. ROM is improving with OT. He notes some pain to the dorsal aspect of his wrist, mild pain to the volar aspect of his wrist, which is better then what it was prior to surgery. He is happy he is now able to make stuffed peppers, which he wasn't able to do before. He has continued numbness to his ring finger and notes this \"feels different\". He notes small finger dysfunction at times over the past several years as well.       Past Medical History:  Past Medical History:   Diagnosis Date    Allergic 1968    Seasonal    Arthritis 2012    Cervical disc disease     Chronic kidney disease 2012    Chronic pain disorder     CTS (carpal tunnel syndrome) 2002    Diabetes mellitus (HCC)     Full dentures     only using upper    Head injury 1975    Headache(784.0) 1980    High blood sugar     Hypertension     Kidney stone     Liver disease     Lumbar disc disease     Migraines     Neuropathy in diabetes (HCC)     RLS (restless legs syndrome)     Skin cancer     in past    Visual impairment 2017    Cataracts. Surgery to correct in 2021     Past Surgical History:   Procedure Laterality Date    AMPUTATION  2022    Little toe left    CATARACT EXTRACTION Bilateral     COLONOSCOPY      CONTRACTURE Right 01/20/2025    Procedure: Right wrist and hand extensor tenolysis and wrist capsulotomy;  Surgeon: Carroll Mccarthy MD;  Location: [facility];  Service: Orthopedics    EYE SURGERY  2021    FOOT SURGERY  2022    Left Foot    FRACTURE SURGERY  1968    Right Tib/Fib    GANGLION CYST EXCISION Left 03/25/2024    Procedure: EXCISION MUCOID " CYST, INDEX FINGER DIP;  Surgeon: Carroll Mccarthy MD;  [facility];  Service: Orthopedics    GANGLION CYST EXCISION Right 05/20/2024    Procedure: EXCISION MUCOID CYST - RIGHT INDEX AND MIDDLE FINGERS;  Surgeon: Carroll Mccarthy MD;  [facility];  Service: Orthopedics    HERNIA REPAIR      INCISION AND DRAINAGE  01/16/2024    IR NEPHROURETERAL ACCESS FOR UROLOGY PCNL  04/07/2025    KIDNEY SURGERY Right 06/2012    KNEE SURGERY Right 1980    MOUTH SURGERY      ND AMPUTATION METATARSAL W/TOE SINGLE Left 6/1/2022    Procedure: RAY RESECTION FOOT;  Surgeon: Hannah Melgoza DPM;  [facility];  Service: Podiatry    ND INCISION & DRAINAGE ABSCESS COMPLICATED/MULTIPLE Right 09/17/2024    Procedure: Irrigation and debridement right wrist and hand, any indicated procedures;  Surgeon: Carroll Mccarthy MD;  [facility];  Service: Orthopedics    ND INCISION EXTENSOR TENDON SHEATH WRIST Right 01/20/2025    Procedure: RELEASE DEQUERVAINS - Right;  Surgeon: Carroll Mccarthy MD;  [facility];  Service: Orthopedics    ND PERQ NL/PL LITHOTRP COMPLEX >2 CM MLT LOCATIONS Right 04/11/2025    Procedure: NEPHROLITHOTOMY  PERCUTANEOUS (PCNL);  Surgeon: Adis Hamilton MD;  [facility];  Service: Urology    ND RPR AA HERNIA 1ST < 3 CM REDUCIBLE N/A 7/14/2023    Procedure: REPAIR HERNIA INCISIONAL;  Surgeon: Matt Melo MD;  [facility];  Service: General    ND SYNOVECTOMY EXTENSOR TENDON SHTH WRIST 1 CMPRT Right 10/03/2024    Procedure: Irrigation and debridement, right wrist, volar and dorsal, possible extensor and flexor tenosynovectomy, any indicated procedures;  Surgeon: Carroll Mccarthy MD;  [facility];  Service: Orthopedics    ND SYNOVECTOMY EXTENSOR TENDON SHTH WRIST 1 CMPRT Right 03/24/2025    Procedure: SYNOVECTOMY / DEBRIDEMENT - FLEXOR CARPI RADIALIS AND FLEXOR CARPI ULNARIS TENDONS- RIGHT;  Surgeon: Carroll Vallejo  MD Shari;  [facility];  Service: Orthopedics    TONSILLECTOMY  1968    Yes    WOUND DEBRIDEMENT Left 4/28/2022    Procedure: Left foot washout;  Surgeon: Hannah Melgoza DPM;  [facility];  Service: Podiatry    WOUND DEBRIDEMENT Left 6/6/2022    Procedure: DEBRIDEMENT WOUND (WASH OUT);  Surgeon: Hannah Melgoza DPM;  [facility];  Service: Podiatry     Family History   Problem Relation Age of Onset    Diabetes Paternal Grandmother     Diabetes Paternal Grandfather     Arthritis Maternal Grandmother      Social History     Socioeconomic History    Marital status: Registered Domestic Partner     Spouse name: Not on file    Number of children: Not on file    Years of education: Not on file    Highest education level: Not on file   Occupational History    Not on file   Tobacco Use    Smoking status: Never    Smokeless tobacco: Never   Vaping Use    Vaping status: Never Used   Substance and Sexual Activity    Alcohol use: Yes     Alcohol/week: 1.0 standard drink of alcohol     Types: 1 Cans of beer per week     Comment: ocassional    Drug use: Never    Sexual activity: Not Currently     Partners: Female     Birth control/protection: Abstinence   Other Topics Concern    Not on file   Social History Narrative    Not on file     Social Drivers of Health     Financial Resource Strain: Not on file   Food Insecurity: Patient Declined (4/11/2025)    Nursing - Inadequate Food Risk Classification     Worried About Running Out of Food in the Last Year: Patient declined     Ran Out of Food in the Last Year: Patient declined     Ran Out of Food in the Last Year: Never true   Transportation Needs: No Transportation Needs (4/11/2025)    Nursing - Transportation Risk Classification     Lack of Transportation: Not on file     Lack of Transportation: No   Physical Activity: Not on file   Stress: Not on file   Social Connections: Not on file   Intimate Partner Violence: Unknown (4/11/2025)    Nursing IPS      Feels Physically and Emotionally Safe: Not on file     Physically Hurt by Someone: Not on file     Humiliated or Emotionally Abused by Someone: Not on file     Physically Hurt by Someone: No     Hurt or Threatened by Someone: No   Housing Stability: Unknown (2025)    Nursing: Inadequate Housing Risk Classification     Has Housing: Not on file     Worried About Losing Housing: Not on file     Unable to Get Utilities: Not on file     Unable to Pay for Housing in the Last Year: No     Has Housin     Scheduled Meds:  Continuous Infusions:No current facility-administered medications for this visit.    PRN Meds:.  Allergies   Allergen Reactions    Cephalexin Hives     Skin peeling  Tolerates penicillins (tolerated unasyn and zosyn)    Metformin GI Intolerance    Pollen Extract Sneezing       Physical Examination:    Ht 6' (1.829 m)   Wt 128 kg (282 lb)   BMI 38.25 kg/m²     Gen: A&Ox3, NAD    Right Upper Extremity:  Incision healing well without signs of infection   Sutures intact, removed  Sensation intact to light touch in the axillary median and radial nerve distributions  Warm, well-perfused digits  Cap refill <2s  - tinel's at the cubital tunnel   + ulnar nerve flexion compression test   ROM right wrist and hand: See pictures below                 Studies:  No new imaging to review     Labs:  I personally reviewed the following labs,  Lab Results   Component Value Date    HGBA1C 10.4 (H) 2025    HGBA1C 8.8 (H) 2025         Assessment & Plan  Right wrist tendonitis  60 y.o. male presents 43 days status post Synovectomy / Debridement - Flexor Carpi Radialis And Flexor Carpi Ulnaris Tendons- Right - Right. Overall Ed is doing well. Pre operative pain has improved. Wrist and finger ROM has improved. He is still seeing benefit from OT, continue to attend once or twice a week.  Will monitor ring finger numbness, discussed he may have cubital tunnel syndrome based on his exam and history. US bilateral  cubital tunnel was ordered to further evaluate for cubital tunnel syndrome as he notes similar symptoms on the left as well.     It is recommended he return to the office in 6-8 weeks for clinical re-evaluation, will review US bilateral cubital tunnel at that time.    Orders:    Ambulatory Referral to PT/OT Hand Therapy; Future    Cubital tunnel syndrome, bilateral  The etiology/physiology of cubital tunnel syndrome was discussed. US bilateral cubital tunnel was ordered as he notes similar symptoms on the left as well. We discussed cubital tunnel syndrome may be playing a role in his ring finger numbness as well as small finger dysfunction.    Orders:    US Cubital Tunnel; Future      he expressed understanding of the plan and agreed. We encouraged them to contact our office with any questions or concerns.       Carroll Mccarthy MD  Hand and Upper Extremity Surgery      *This note was dictated using Dragon voice recognition software. Please excuse any word substitutions or errors.*      Scribe Attestation      I,:  Jaja Constantino MA am acting as a scribe while in the presence of the attending physician.:       I,:  Carroll Mccarthy MD personally performed the services described in this documentation    as scribed in my presence.:

## 2025-05-06 NOTE — ASSESSMENT & PLAN NOTE
60 y.o. male presents 43 days status post Synovectomy / Debridement - Flexor Carpi Radialis And Flexor Carpi Ulnaris Tendons- Right - Right. Overall Ed is doing well. Pre operative pain has improved. Wrist and finger ROM has improved. He is still seeing benefit from OT, continue to attend once or twice a week.  Will monitor ring finger numbness, discussed he may have cubital tunnel syndrome based on his exam and history. US bilateral cubital tunnel was ordered to further evaluate for cubital tunnel syndrome as he notes similar symptoms on the left as well.     It is recommended he return to the office in 6-8 weeks for clinical re-evaluation, will review US bilateral cubital tunnel at that time.    Orders:    Ambulatory Referral to PT/OT Hand Therapy; Future

## 2025-05-07 ENCOUNTER — HOSPITAL ENCOUNTER (OUTPATIENT)
Dept: RADIOLOGY | Facility: HOSPITAL | Age: 60
Discharge: HOME | End: 2025-05-07
Payer: MEDICARE

## 2025-05-07 ENCOUNTER — ANESTHESIA (OUTPATIENT)
Dept: RADIOLOGY | Facility: HOSPITAL | Age: 60
End: 2025-05-07
Payer: MEDICARE

## 2025-05-07 ENCOUNTER — HOSPITAL ENCOUNTER (OUTPATIENT)
Dept: SLEEP CENTER | Facility: CLINIC | Age: 60
Discharge: HOME | End: 2025-05-07
Payer: MEDICARE

## 2025-05-07 VITALS
HEART RATE: 66 BPM | HEIGHT: 72 IN | OXYGEN SATURATION: 93 % | TEMPERATURE: 96.9 F | WEIGHT: 282 LBS | SYSTOLIC BLOOD PRESSURE: 147 MMHG | RESPIRATION RATE: 16 BRPM | DIASTOLIC BLOOD PRESSURE: 89 MMHG | BODY MASS INDEX: 38.19 KG/M2

## 2025-05-07 DIAGNOSIS — G47.8 OTHER SLEEP DISORDERS: ICD-10-CM

## 2025-05-07 DIAGNOSIS — G43.709 CHRONIC MIGRAINE WITHOUT AURA WITHOUT STATUS MIGRAINOSUS, NOT INTRACTABLE: ICD-10-CM

## 2025-05-07 DIAGNOSIS — R06.83 SNORING: ICD-10-CM

## 2025-05-07 PROCEDURE — 70551 MRI BRAIN STEM W/O DYE: CPT

## 2025-05-07 PROCEDURE — G0399 HOME SLEEP TEST/TYPE 3 PORTA: HCPCS

## 2025-05-07 RX ORDER — DEXAMETHASONE SODIUM PHOSPHATE 10 MG/ML
INJECTION, SOLUTION INTRAMUSCULAR; INTRAVENOUS AS NEEDED
Status: DISCONTINUED | OUTPATIENT
Start: 2025-05-07 | End: 2025-05-07

## 2025-05-07 RX ORDER — EPHEDRINE SULFATE 50 MG/ML
INJECTION INTRAVENOUS AS NEEDED
Status: DISCONTINUED | OUTPATIENT
Start: 2025-05-07 | End: 2025-05-07

## 2025-05-07 RX ORDER — SODIUM CHLORIDE, SODIUM LACTATE, POTASSIUM CHLORIDE, CALCIUM CHLORIDE 600; 310; 30; 20 MG/100ML; MG/100ML; MG/100ML; MG/100ML
INJECTION, SOLUTION INTRAVENOUS CONTINUOUS PRN
Status: DISCONTINUED | OUTPATIENT
Start: 2025-05-07 | End: 2025-05-07

## 2025-05-07 RX ORDER — ONDANSETRON 2 MG/ML
INJECTION INTRAMUSCULAR; INTRAVENOUS AS NEEDED
Status: DISCONTINUED | OUTPATIENT
Start: 2025-05-07 | End: 2025-05-07

## 2025-05-07 RX ORDER — LIDOCAINE HYDROCHLORIDE 10 MG/ML
INJECTION, SOLUTION EPIDURAL; INFILTRATION; INTRACAUDAL; PERINEURAL AS NEEDED
Status: DISCONTINUED | OUTPATIENT
Start: 2025-05-07 | End: 2025-05-07

## 2025-05-07 RX ORDER — PROPOFOL 10 MG/ML
INJECTION, EMULSION INTRAVENOUS AS NEEDED
Status: DISCONTINUED | OUTPATIENT
Start: 2025-05-07 | End: 2025-05-07

## 2025-05-07 RX ADMIN — ONDANSETRON 4 MG: 2 INJECTION INTRAMUSCULAR; INTRAVENOUS at 14:34

## 2025-05-07 RX ADMIN — EPHEDRINE SULFATE 10 MG: 50 INJECTION, SOLUTION INTRAVENOUS at 14:49

## 2025-05-07 RX ADMIN — PROPOFOL 300 MG: 10 INJECTION, EMULSION INTRAVENOUS at 14:34

## 2025-05-07 RX ADMIN — SODIUM CHLORIDE, SODIUM LACTATE, POTASSIUM CHLORIDE, AND CALCIUM CHLORIDE: .6; .31; .03; .02 INJECTION, SOLUTION INTRAVENOUS at 14:29

## 2025-05-07 RX ADMIN — DEXAMETHASONE SODIUM PHOSPHATE 10 MG: 10 INJECTION, SOLUTION INTRAMUSCULAR; INTRAVENOUS at 14:34

## 2025-05-07 RX ADMIN — LIDOCAINE HYDROCHLORIDE 50 MG: 10 INJECTION, SOLUTION EPIDURAL; INFILTRATION; INTRACAUDAL; PERINEURAL at 14:34

## 2025-05-07 NOTE — ANESTHESIA POSTPROCEDURE EVALUATION
Post-Op Assessment Note    CV Status:  Stable  Pain Score: 0    Pain management: adequate       Mental Status:  Awake   Hydration Status:  Stable   PONV Controlled:  None   Airway Patency:  Patent     Post Op Vitals Reviewed: Yes    No anethesia notable event occurred.    Staff: Anesthesiologist, CRNA           Last Filed PACU Vitals:  Vitals Value Taken Time   Temp     Pulse     BP     Resp     SpO2            REPORT TO MRI RN, VSS ON MONITORS, SV ON RA, SEE RN NOTE FOR LAST VS.

## 2025-05-07 NOTE — NURSING NOTE
MRI brain without contrast complete. Pt tolerated well.    VSS post procedure. Pt alert and oriented on room air. No complaints or visible signs of distress.    Report given to Trini ENRIQUE [facility] RN  Transported back to [facility].

## 2025-05-07 NOTE — ANESTHESIA PREPROCEDURE EVALUATION
Procedure:  MRI BRAIN WO CONTRAST    Relevant Problems   CARDIO   (+) Essential hypertension      ENDO   (+) Type 2 diabetes mellitus with chronic kidney disease, without long-term current use of insulin (HCC)      /RENAL   (+) Chronic kidney disease, stage 3 (HCC)   (+) Nephrolithiasis      HEMATOLOGY   (+) Anemia      MUSCULOSKELETAL   (+) Low back pain with sciatica      Orthopedic/Musculoskeletal   (+) Cervical radiculopathy      Other   (+) Class 2 severe obesity with serious comorbidity in adult (HCC)   (+) Ventral hernia without obstruction or gangrene      EKG: SR    CMP: wnl    EKG: SR  Physical Exam    Airway    Mallampati score: III  TM Distance: >3 FB  Neck ROM: limited     Dental       Cardiovascular      Pulmonary      Other Findings    Anesthesia Plan  ASA Score- 3     Anesthesia Type- general with ASA Monitors.         Additional Monitors:     Airway Plan:            Plan Factors-    Chart reviewed. EKG reviewed.  Existing labs reviewed. Patient summary reviewed.    Patient is not a current smoker.  Patient did not smoke on day of surgery.            Induction- intravenous.    Postoperative Plan- . Planned trial extubation    Perioperative Resuscitation Plan - Level 1 - Full Code.       Informed Consent- Anesthetic plan and risks discussed with patient.  I personally reviewed this patient with the CRNA. Discussed and agreed on the Anesthesia Plan with the CRNA..    NPO Status:  No vitals data found for the desired time range.

## 2025-05-08 ENCOUNTER — RESULTS FOLLOW-UP (OUTPATIENT)
Dept: NEUROLOGY | Facility: CLINIC | Age: 60
End: 2025-05-08

## 2025-05-08 ENCOUNTER — CONSULT (OUTPATIENT)
Dept: NEUROSURGERY | Facility: CLINIC | Age: 60
End: 2025-05-08
Payer: MEDICARE

## 2025-05-08 VITALS
OXYGEN SATURATION: 99 % | DIASTOLIC BLOOD PRESSURE: 80 MMHG | TEMPERATURE: 97.4 F | HEART RATE: 89 BPM | SYSTOLIC BLOOD PRESSURE: 160 MMHG

## 2025-05-08 DIAGNOSIS — M54.2 CERVICALGIA: Primary | ICD-10-CM

## 2025-05-08 DIAGNOSIS — M54.12 CERVICAL RADICULOPATHY: ICD-10-CM

## 2025-05-08 PROCEDURE — 99205 OFFICE O/P NEW HI 60 MIN: CPT | Performed by: NURSE PRACTITIONER

## 2025-05-08 NOTE — PROGRESS NOTES
Name: Yoni Castillo      : 1965      MRN: [id number]  Encounter Provider: JAN Sommer  Encounter Date: 2025   Encounter department: [facility]  :  Assessment & Plan  Cervical radiculopathy    Orders:    Ambulatory referral to Neurosurgery    Cervicalgia  Presents as referral from neurologist for evaluation of neck pain  Worsening over 3-4 years.  Associated with pain radiating occasionally down bilateral arms.  Episodic in nature and worse when it rains then resolves over a period of 3 months and returns.  Associated with dysphagia and episode last week where he gagged and vomited d/t this.  Follows with Dr. Pederson for pain management but has been opposed to any injections.  On exam, R  weakness (s/p 5 surgery to hand following dog bite), otherwise intact.    Imaging:  MRI cervical spine 24: Mild developmental spinal canal stenosis exacerbated by acquired degenerative disc disease. Multilevel cervical spondylitic degenerative change worst at the C4-5 and C5-6 levels where there is right-sided disc osteophyte complex resulting in significant canal stenosis. There is also advanced bilateral foraminal narrowing at C4-5. Advanced foraminal narrowing at C3-4 bilaterally.    Plan:  Reviewed MRI extensively with patient and his SO.  Discussed that symptoms of neck pain could be a result of degenerative disease.  Additionally reviewed that he has congenital narrowing of spinal canal and evidence of disc osteophyte complexes. On occasional, these can be associated with esophageal dysfunction and dysphagia.  I have urged him to discuss ordering video barium swallow when he sees his PCP next week.  His most recent Hgb A1c on  was over 10. We discussed that this precludes elective surgical intervention as value would need to be below 7.5.  He will follow up as needed.             History of Present Illness     Yoni Castillo is a 60 y.o. male     With past medical  "history of type 2 diabetes and chronic neuropathy, stage III CKD, obesity, chronic migraine, who presents as referral from neurology for evaluation of cervicalgia.  He relates that he has had neck pain posteriorly and into his trapezius muscles for the past 3 to 4 years.  Recently, he feels it is associated with difficulty swallowing and gagging on his food.  He reports that last week he attempted to swallow a bite of a sandwich and gagged to the point where he vomited.  Pain is associated with radiation down his arms on occasion.  He does have chronic numbness and tingling to his fingers due to neuropathy.  He relates that symptoms seem to be episodic and worse during times of year when it rains frequently.  He relates that symptoms will be bad and then eventually resolve over period of 3 months.    He is right-hand dominant.  He unfortunately suffered a dog bite to his right hand which has required multiple surgical interventions.  Because of this he has chronic right sided  weakness.  He also reports some imbalance when he walks due to neuropathy.  He denies any bowel or bladder issues.  He does report difficulty with the use of his right hand due to the above.  He follows with Dr. Pederson for pain management but relates that he has been very hesitant to pursue any injections because he has heard that they \"only last about 3 months and people end up needing surgery anyway\" he describes that his hemoglobin A1c recently was very elevated because he has been suffering from depression due to recent loss of pets.  He is accompanied today by his significant other.         Review of Systems   Constitutional: Negative.    HENT:  Positive for tinnitus (H/O).    Eyes:  Positive for visual disturbance (H/O floaters- colored fading rings when migraines are bad).   Respiratory: Negative.     Cardiovascular: Negative.    Gastrointestinal: Negative.    Endocrine: Negative.    Genitourinary: Negative.    Musculoskeletal:  " Positive for myalgias (occasionally), neck pain and neck stiffness.        Consult- Cervical radiculopathy  MRI Cspine on 8/30/24    7/10 left sided back of neck pain radiates  8/10 quick stabbing  pain in right arm, occasionally in the left arm  N/T right hand/fingers (previous injury) occasionally left hand/fingers  W- BUE R>L, struggles to lift anything above shoulders  Occasional balance issues going up stairs (feels like will fall backwards if there is no railing)  Swallowing/gagging when switching between solids and liquids - Vomiting new onset    PT-None  PM-None  EMG- None      No ac/Non smoker/No previous neck sx        Skin: Negative.    Allergic/Immunologic: Negative.    Neurological:  Positive for weakness, numbness and headaches.   Hematological: Negative.    Psychiatric/Behavioral:  Positive for sleep disturbance (due to pain).    All other systems reviewed and are negative.    I have personally reviewed the MA's review of systems and made changes as necessary.    Past Medical History   Past Medical History:   Diagnosis Date    Allergic 1968    Seasonal    Arthritis 2012    Cervical disc disease     Chronic kidney disease 2012    Chronic pain disorder     CTS (carpal tunnel syndrome) 2002    Diabetes mellitus (HCC)     Full dentures     only using upper    Head injury 1975    Headache(784.0) 1980    High blood sugar     Hypertension     Kidney stone     Liver disease     Lumbar disc disease     Migraines     Neuropathy in diabetes (HCC)     RLS (restless legs syndrome)     Skin cancer     in past    Visual impairment 2017    Cataracts. Surgery to correct in 2021     Past Surgical History:   Procedure Laterality Date    AMPUTATION  2022    Little toe left    CATARACT EXTRACTION Bilateral     COLONOSCOPY      CONTRACTURE Right 01/20/2025    Procedure: Right wrist and hand extensor tenolysis and wrist capsulotomy;  Surgeon: Carroll Mccarthy MD;  [facility];  Service: Orthopedics    EYE  SURGERY  2021    FOOT SURGERY  2022    Left Foot    FRACTURE SURGERY  1968    Right Tib/Fib    GANGLION CYST EXCISION Left 03/25/2024    Procedure: EXCISION MUCOID CYST, INDEX FINGER DIP;  Surgeon: Carroll Mccarthy MD;  [facility];  Service: Orthopedics    GANGLION CYST EXCISION Right 05/20/2024    Procedure: EXCISION MUCOID CYST - RIGHT INDEX AND MIDDLE FINGERS;  Surgeon: Carroll Mccarthy MD;  [facility];  Service: Orthopedics    HERNIA REPAIR      INCISION AND DRAINAGE  01/16/2024    IR NEPHROURETERAL ACCESS FOR UROLOGY PCNL  04/07/2025    KIDNEY SURGERY Right 06/2012    KNEE SURGERY Right 1980    MOUTH SURGERY      PA AMPUTATION METATARSAL W/TOE SINGLE Left 6/1/2022    Procedure: RAY RESECTION FOOT;  Surgeon: Hannah Melgoza DPM;  [facility];  Service: Podiatry    PA INCISION & DRAINAGE ABSCESS COMPLICATED/MULTIPLE Right 09/17/2024    Procedure: Irrigation and debridement right wrist and hand, any indicated procedures;  Surgeon: Carroll Mccarthy MD;  [facility];  Service: Orthopedics    PA INCISION EXTENSOR TENDON SHEATH WRIST Right 01/20/2025    Procedure: RELEASE DEQUERVAINS - Right;  Surgeon: Carroll Mccarhty MD;  [facility];  Service: Orthopedics    PA PERQ NL/PL LITHOTRP COMPLEX >2 CM MLT LOCATIONS Right 04/11/2025    Procedure: NEPHROLITHOTOMY  PERCUTANEOUS (PCNL);  Surgeon: Adis Hamilton MD;  [facility];  Service: Urology    PA RPR AA HERNIA 1ST < 3 CM REDUCIBLE N/A 7/14/2023    Procedure: REPAIR HERNIA INCISIONAL;  Surgeon: Matt Melo MD;  [facility];  Service: General    PA SYNOVECTOMY EXTENSOR TENDON SHTH WRIST 1 CMPRT Right 10/03/2024    Procedure: Irrigation and debridement, right wrist, volar and dorsal, possible extensor and flexor tenosynovectomy, any indicated procedures;  Surgeon: Carroll Mccarthy MD;  [facility];  Service: Orthopedics    PA SYNOVECTOMY EXTENSOR TENDON SHTH  WRIST 1 CMPRT Right 03/24/2025    Procedure: SYNOVECTOMY / DEBRIDEMENT - FLEXOR CARPI RADIALIS AND FLEXOR CARPI ULNARIS TENDONS- RIGHT;  Surgeon: Carroll Mccarthy MD;  [facility];  Service: Orthopedics    TONSILLECTOMY  1968    Yes    WOUND DEBRIDEMENT Left 4/28/2022    Procedure: Left foot washout;  Surgeon: Hannah Melgoza DPM;  [facility];  Service: Podiatry    WOUND DEBRIDEMENT Left 6/6/2022    Procedure: DEBRIDEMENT WOUND (WASH OUT);  Surgeon: Hannah Melgoza DPM;  [facility];  Service: Podiatry     Family History   Problem Relation Age of Onset    Diabetes Paternal Grandmother     Diabetes Paternal Grandfather     Arthritis Maternal Grandmother      he reports that he has never smoked. He has never used smokeless tobacco. He reports current alcohol use of about 1.0 standard drink of alcohol per week. He reports that he does not use drugs.  Current Outpatient Medications   Medication Instructions    Accu-Chek FastClix Lancets MISC Use to test blood sugar once a day    acetaminophen (TYLENOL) 1,000 mg, Oral, Every 8 hours    amitriptyline (ELAVIL) 10 mg tablet 30mg at bedtime    ammonium lactate (LAC-HYDRIN) 12 % lotion 2 times daily PRN    Blood Glucose Monitoring Suppl (Accu-Chek Guide Me) w/Device KIT Use to check blood sugar once a day    carvedilol (COREG) 25 mg, Oral, 2 times daily with meals    celecoxib (CELEBREX) 200 mg, Oral, 2 times daily    Empagliflozin (JARDIANCE) 25 mg, Oral, Daily    glucose blood (Accu-Chek Guide) test strip Use to check blood sugar once a day    Insulin Pen Needle (BD Pen Needle [place] U/F) 32G X 4 MM MISC Does not apply, Daily    ketoconazole (NIZORAL) 2 % cream As needed    lidocaine (Lidoderm) 5 % 1 patch, Topical, Daily, Remove & Discard patch within 12 hours or as directed by MD    lisinopril-hydrochlorothiazide (PRINZIDE,ZESTORETIC) 20-12.5 MG per tablet 1 tablet, Oral, 2 times daily    methocarbamol (ROBAXIN) 750 mg, Oral, Daily PRN     traMADol (ULTRAM) 100 mg, Oral, Daily at bedtime PRN    Ubrogepant (UBRELVY) 100 MG tablet Take 1 tablet (100 mg) one time as needed for migraine. May repeat one additional tablet (100 mg) at least two hours after the first dose. Do not use more than two doses per day, or for more than twelve days per month.     Allergies   Allergen Reactions    Cephalexin Hives     Skin peeling  Tolerates penicillins (tolerated unasyn and zosyn)    Metformin GI Intolerance    Pollen Extract Sneezing      Objective   /80 (Patient Position: Sitting, Cuff Size: Standard)   Pulse 89   Temp (!) 97.4 °F (36.3 °C) (Temporal)   SpO2 99%     Physical Exam  Constitutional:       General: He is not in acute distress.     Appearance: He is well-developed. He is obese. He is not diaphoretic.   Eyes:      General:         Right eye: No discharge.         Left eye: No discharge.      Conjunctiva/sclera: Conjunctivae normal.      Pupils: Pupils are equal, round, and reactive to light.   Pulmonary:      Effort: Pulmonary effort is normal. No respiratory distress.   Abdominal:      General: There is no distension.      Tenderness: There is no abdominal tenderness.   Musculoskeletal:         General: Normal range of motion.      Cervical back: Normal range of motion and neck supple.   Skin:     General: Skin is warm and dry.   Neurological:      Mental Status: He is alert and oriented to person, place, and time.      Cranial Nerves: No cranial nerve deficit.      Deep Tendon Reflexes: Reflexes are normal and symmetric. Reflexes normal.   Psychiatric:         Behavior: Behavior normal.         Thought Content: Thought content normal.         Judgment: Judgment normal.       Neurological Exam  Mental Status  Alert. Oriented to person, place, and time.    Cranial Nerves  CN III, IV, VI: Pupils equal round and reactive to light bilaterally.    Motor   Strength is 5/5 in all four extremities except as noted.  R  weakness  4/5.    Sensory  Chronic neuropathy feet and hands.    Reflexes  Deep tendon reflexes are 2+ and symmetric in all four extremities.    Right pathological reflexes: Naya's absent. Ankle clonus absent.  Left pathological reflexes: Naya's absent. Ankle clonus absent.      Radiology Results Review: I personally reviewed the following image studies in PACS and associated radiology reports: MRI spine. My interpretation of the radiology images/reports is: as above.    Administrative Statements   I have spent a total time of 45 minutes in caring for this patient on the day of the visit/encounter including Diagnostic results, Risks and benefits of tx options, Instructions for management, Patient and family education, Importance of tx compliance, Risk factor reductions, Impressions, Counseling / Coordination of care, Documenting in the medical record, Reviewing/placing orders in the medical record (including tests, medications, and/or procedures), and Obtaining or reviewing history  .

## 2025-05-08 NOTE — ASSESSMENT & PLAN NOTE
Presents as referral from neurologist for evaluation of neck pain  Worsening over 3-4 years.  Associated with pain radiating occasionally down bilateral arms.  Episodic in nature and worse when it rains then resolves over a period of 3 months and returns.  Associated with dysphagia and episode last week where he gagged and vomited d/t this.  Follows with Dr. Pederson for pain management but has been opposed to any injections.  On exam, R  weakness (s/p 5 surgery to hand following dog bite), otherwise intact.    Imaging:  MRI cervical spine 8/30/24: Mild developmental spinal canal stenosis exacerbated by acquired degenerative disc disease. Multilevel cervical spondylitic degenerative change worst at the C4-5 and C5-6 levels where there is right-sided disc osteophyte complex resulting in significant canal stenosis. There is also advanced bilateral foraminal narrowing at C4-5. Advanced foraminal narrowing at C3-4 bilaterally.    Plan:  Reviewed MRI extensively with patient and his SO.  Discussed that symptoms of neck pain could be a result of degenerative disease.  Additionally reviewed that he has congenital narrowing of spinal canal and evidence of disc osteophyte complexes. On occasional, these can be associated with esophageal dysfunction and dysphagia.  I have urged him to discuss ordering video barium swallow when he sees his PCP next week.  His most recent Hgb A1c on 4/6 was over 10. We discussed that this precludes elective surgical intervention as value would need to be below 7.5.  He will follow up as needed.

## 2025-05-08 NOTE — PROGRESS NOTES
Home Sleep Study Documentation    HOME STUDY DEVICE: Noxturnal yes                                           Leti G3 no      Pre-Sleep Home Study:    Set-up and instructions performed by:  PENELOPE Baird    Technician performed demonstration for Patient: yes    Return demonstration performed by Patient: yes    Written instructions provided to Patient: yes    Patient signed consent form: yes        Post-Sleep Home Study:    Additional comments by Patient: None    Home Sleep Study Failed:no:    Failure reason: N/A    Reported or Detected: N/A    Scored by: PENELOPE Steen

## 2025-05-09 ENCOUNTER — HOSPITAL ENCOUNTER (OUTPATIENT)
Dept: ULTRASOUND IMAGING | Facility: HOSPITAL | Age: 60
Discharge: HOME | End: 2025-05-09
Attending: ORTHOPAEDIC SURGERY
Payer: MEDICARE

## 2025-05-09 DIAGNOSIS — G56.23 CUBITAL TUNNEL SYNDROME, BILATERAL: ICD-10-CM

## 2025-05-09 PROBLEM — G47.33 OSA (OBSTRUCTIVE SLEEP APNEA): Status: ACTIVE | Noted: 2025-05-09

## 2025-05-09 PROCEDURE — 76882 US LMTD JT/FCL EVL NVASC XTR: CPT

## 2025-05-09 PROCEDURE — 95806 SLEEP STUDY UNATT&RESP EFFT: CPT | Performed by: INTERNAL MEDICINE

## 2025-05-09 NOTE — PROGRESS NOTES
"Daily Note    Today's date: 2025  Patient name: Yoni Castillo  : 1965  MRN: [id number]  Referring provider: Yesenia Carrasquillo PA-C  Dx:   Encounter Diagnosis     ICD-10-CM    1. Tenosynovitis of finger and hand  M65.949       2. Animal bite of left hand with infection, subsequent encounter  S61.452D     L08.9       3. Right wrist tendonitis  M77.8       4. Aftercare following surgery of the musculoskeletal system  Z47.89       5. Stiffness of finger joint of right hand  M25.641       6. Wrist stiffness, right  M25.631             Start Time: 1100  Stop Time: 1145  Total time in clinic (min): 45 minutes    Subjective: \"The front scar is still pretty sensitive.\"       Objective: See treatment diary below      Assessment: Tolerated treatment well. Pt still demos dec active wrist flexion/extension compared to his unaffected UE. He reported inc hypersensitivity on scar on volar aspect of wrist. Pt req mod rest breaks during wrist/digit PROM to manage instances of inc pain. Cont EDU on importance of wrist stretches throughout daily routine. Pt was able to nearly make active full fist by end of session.       Plan: Pt will contact clinic for additional visits PRN following his OV with Dr. Mccarthy     Precautions: 3/24 FCR and FCU debridement and tenosynovectomy     Manuals  3/27  3/31  4/3  4/10  4/14 4/17 4/21 4/24 4/28 5/1 re-eval     Edema mob    10'  10'  10'  10' 10' 10' 10' 10' 10' 10'   Scar mob               STM    5'  5'  5'  5' 5' 5' 5' 5' 5' 5'                                           Ther Ex                        Forearm AROM 2x20  2x20  2x20  2x20  2x20 2x20 2x20 2x20 2x20 2x20 2x20   Wrist P/AROM 2x20 2x20 2x20 2x20  2x20 2x20 2x20 2x20 2x20 2x20 2x20   Tendon glides 2x20 2x20 2x20 2x20  2x20 2x20 2x20 2x20 2x20 2x20 2x20   Thumb P/AROM X20 all planes X20 all planes X20 all planes X20 all planes  X20 all planes X20 all planes X20 all planes X20 all planes X20 all planes X20 all planes X20 all " "planes   Ball stretch      10x10s 10x10s 10x10s       Strengthening   Puttycise \"L\" tool 10' Puttycise \"L\" tool 10' Puttycise \"L\" tool 10' Puttycise \"L\" tool 4lb dumbbell press and drag    Flex bar squeeze     Yellow putty press with small dowel.  Green pinch clips around digit web x20    Putty press with 4lb dumbbell and small dowel    Wrist twist bar 2x20 Green pinch clip large peg placement    Wrist twist bar 2x20 Yellow putty squeeze/press/twist Green putty press x20 reps 2#    Flex bar twists x20                                                                                                                 Ther Activity                    Grasp/release            Putty cutting/rolling with pizza cutter                                                                                                                Neuro Re-Ed                                                                   Ortho Fit                                                                   Modalities                   HP    5' 5'  5'  5' 5' 5' 5' 5' 5' 5'                                "

## 2025-05-12 ENCOUNTER — OFFICE VISIT (OUTPATIENT)
Dept: OCCUPATIONAL THERAPY | Age: 60
End: 2025-05-12
Payer: MEDICARE

## 2025-05-12 ENCOUNTER — RESULTS FOLLOW-UP (OUTPATIENT)
Dept: NEUROLOGY | Facility: CLINIC | Age: 60
End: 2025-05-12

## 2025-05-12 DIAGNOSIS — M77.8 RIGHT WRIST TENDONITIS: ICD-10-CM

## 2025-05-12 DIAGNOSIS — G47.33 OSA (OBSTRUCTIVE SLEEP APNEA): Primary | ICD-10-CM

## 2025-05-12 DIAGNOSIS — Z47.89 AFTERCARE FOLLOWING SURGERY OF THE MUSCULOSKELETAL SYSTEM: ICD-10-CM

## 2025-05-12 DIAGNOSIS — M25.641 STIFFNESS OF FINGER JOINT OF RIGHT HAND: ICD-10-CM

## 2025-05-12 DIAGNOSIS — M25.511 ACUTE PAIN OF RIGHT SHOULDER: Primary | ICD-10-CM

## 2025-05-12 DIAGNOSIS — S61.452D ANIMAL BITE OF LEFT HAND WITH INFECTION, SUBSEQUENT ENCOUNTER: ICD-10-CM

## 2025-05-12 DIAGNOSIS — M25.631 WRIST STIFFNESS, RIGHT: ICD-10-CM

## 2025-05-12 DIAGNOSIS — M65.949 TENOSYNOVITIS OF FINGER AND HAND: Primary | ICD-10-CM

## 2025-05-12 DIAGNOSIS — L08.9 ANIMAL BITE OF LEFT HAND WITH INFECTION, SUBSEQUENT ENCOUNTER: ICD-10-CM

## 2025-05-12 PROCEDURE — 97110 THERAPEUTIC EXERCISES: CPT

## 2025-05-12 PROCEDURE — 97140 MANUAL THERAPY 1/> REGIONS: CPT

## 2025-05-13 ENCOUNTER — OFFICE VISIT (OUTPATIENT)
Age: 60
End: 2025-05-13
Payer: MEDICARE

## 2025-05-13 VITALS
OXYGEN SATURATION: 96 % | SYSTOLIC BLOOD PRESSURE: 124 MMHG | WEIGHT: 282.6 LBS | DIASTOLIC BLOOD PRESSURE: 72 MMHG | TEMPERATURE: 98.2 F | BODY MASS INDEX: 38.28 KG/M2 | HEIGHT: 72 IN | HEART RATE: 86 BPM

## 2025-05-13 DIAGNOSIS — I10 ESSENTIAL HYPERTENSION: ICD-10-CM

## 2025-05-13 DIAGNOSIS — R13.10 DYSPHAGIA, UNSPECIFIED TYPE: Primary | ICD-10-CM

## 2025-05-13 DIAGNOSIS — L97.424 NON-PRESSURE CHRONIC ULCER OF LEFT HEEL AND MIDFOOT WITH NECROSIS OF BONE (HCC): ICD-10-CM

## 2025-05-13 DIAGNOSIS — N18.31 STAGE 3A CHRONIC KIDNEY DISEASE (HCC): ICD-10-CM

## 2025-05-13 DIAGNOSIS — M51.369 DDD (DEGENERATIVE DISC DISEASE), LUMBAR: ICD-10-CM

## 2025-05-13 DIAGNOSIS — E11.22 TYPE 2 DIABETES MELLITUS WITH CHRONIC KIDNEY DISEASE, WITHOUT LONG-TERM CURRENT USE OF INSULIN, UNSPECIFIED CKD STAGE (HCC): ICD-10-CM

## 2025-05-13 DIAGNOSIS — E78.2 MIXED HYPERLIPIDEMIA: ICD-10-CM

## 2025-05-13 DIAGNOSIS — Z13.0 SCREENING FOR DEFICIENCY ANEMIA: ICD-10-CM

## 2025-05-13 DIAGNOSIS — N20.0 NEPHROLITHIASIS: ICD-10-CM

## 2025-05-13 DIAGNOSIS — F11.20 OPIOID DEPENDENCE, UNCOMPLICATED (HCC): ICD-10-CM

## 2025-05-13 DIAGNOSIS — M54.12 CERVICAL RADICULOPATHY: ICD-10-CM

## 2025-05-13 PROBLEM — R39.9 UTI SYMPTOMS: Status: RESOLVED | Noted: 2025-04-15 | Resolved: 2025-05-13

## 2025-05-13 PROCEDURE — 99214 OFFICE O/P EST MOD 30 MIN: CPT | Performed by: INTERNAL MEDICINE

## 2025-05-13 PROCEDURE — G2211 COMPLEX E/M VISIT ADD ON: HCPCS | Performed by: INTERNAL MEDICINE

## 2025-05-13 RX ORDER — METHOCARBAMOL 750 MG/1
1500 TABLET, FILM COATED ORAL EVERY 8 HOURS SCHEDULED
Qty: 60 TABLET | Refills: 2 | Status: SHIPPED | OUTPATIENT
Start: 2025-05-13

## 2025-05-13 NOTE — ASSESSMENT & PLAN NOTE
Current blood pressure assessment confirms adequate control continue lisinopril hydrochlorothiazide 20-12.5 mg 2 times daily continue carvedilol 25 mg twice a day comprehensive metabolic profile with electrolyte balance requested for next visit    Orders:    Comprehensive metabolic panel; Future    UA w Reflex to Microscopic w Reflex to Culture; Future

## 2025-05-13 NOTE — ASSESSMENT & PLAN NOTE
No evidence of current pain to indicate nephrolithiasis.  No further bleeding since last stone removed

## 2025-05-13 NOTE — PROGRESS NOTES
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)         Opioid dependence, uncomplicated (HCC)         Essential hypertension  Current blood pressure assessment confirms adequate control continue lisinopril hydrochlorothiazide 20-12.5 mg 2 times daily continue carvedilol 25 mg twice a day comprehensive metabolic profile with electrolyte balance requested for next visit    Orders:    Comprehensive metabolic panel; Future    UA w Reflex to Microscopic w Reflex to Culture; Future    Type 2 diabetes mellitus with chronic kidney disease, without long-term current use of insulin, unspecified CKD stage (HCC)  Recommend continuation of Jardiance at 25 mg daily and follow-up with endocrinology services through [facility].  Avoid concentrated sugars and excessive carbohydrates in the diet is advised  Lab Results   Component Value Date    HGBA1C 10.4 (H) 2025       Orders:    Comprehensive metabolic panel; Future    Albumin / creatinine urine ratio; Future    Cervical radiculopathy  Note from neurosurgery appreciated intervention seems to be a high risk endeavor recommend continuation without surgery at this time         Stage 3a chronic kidney disease (HCC)  Lab Results   Component Value Date    EGFR 64 2025    EGFR 55 2025    EGFR 53 2025    CREATININE 1.20 2025    CREATININE 1.36 (H) 2025    CREATININE 1.40 (H) 2025   An updated analysis of kidney performance has been requested for the patient's  next visit maintain good hydration avoid nonsteroidal anti-inflammatories and continue tight control of both blood pressure and diabetes    Orders:    Comprehensive metabolic panel; Future    Nephrolithiasis  No evidence of current pain to indicate nephrolithiasis.  No further bleeding since last stone removed         Mixed hyperlipidemia    Orders:    Lipid panel; Future    Screening for deficiency anemia    Orders:    CBC and differential; Future         History of Present Illness     This pleasant 60-year-old gentleman returns today for follow-up visit.  I reviewed his recent consultation with neurosurgery regarding bilateral cervical radiculopathy type symptoms.  A review of his MRI scan of the cervical spine shows extensive congenital issues with cervical spine including canal stenosis as well as multiple levels of bilateral foraminal stenosis.  Surgery recommends conservative treatment.    He continues with rehab for his multiple surgeries of the right hand due to tenosynovitis and infection from multiple dog bites.  The dog responsible for these bites has passed away.    Patient indicates that he has been experiencing difficulty swallowing seems to be a lot of difficulty swallowing bulky foods breads and meats.  He has to chew the food completely but still has difficulty swallowing.  I have recommended a swallow study with speech therapy to evaluate his swallowing mechanics.    He continues under the care of endocrinology services for management of his type 2 diabetes currently on Jardiance most recent fasting blood sugar was 171.  Recommend continued efforts to maintain a balanced low carbohydrate low concentrated sugar diet.      Review of Systems   Musculoskeletal:         Limited motion of the wrist and hand on the right side due to multiple surgeries for tenosynovitis and repeated dog bites.  Continue with physical therapy for rehab   Neurological:         Symptoms of bilateral cervical radiculopathy  reviewed today MRI scan of the cervical spine reviewed he has extensive disease which appears to be somewhat congenital.  Significant canal stenosis along with foraminal issues at multiple levels.     Past Medical History:   Diagnosis Date    Allergic 1968    Seasonal    Arthritis 2012    Brain concussion Multiple    Cervical disc disease     Chronic kidney disease 2012    Chronic pain disorder     CTS (carpal tunnel syndrome) 2002    Diabetes mellitus (HCC)     Full dentures     only using upper    Head injury 1975    Headache(784.0) 1980    High blood sugar     Hypertension     Kidney stone     Liver disease     Low back pain     Cannot remember    Lumbar disc disease     Lumbosacral disc disease     Cannot remember    Migraines     Neuropathy in diabetes (HCC)     RLS (restless legs syndrome)     Skin cancer     in past    Thoracic disc disorder     Cannot remember    Visual impairment 2017    Cataracts. Surgery to correct in 2021     Past Surgical History:   Procedure Laterality Date    AMPUTATION  2022    Little toe left    CARPAL TUNNEL RELEASE  2002    CATARACT EXTRACTION Bilateral     COLONOSCOPY      CONTRACTURE Right 01/20/2025    Procedure: Right wrist and hand extensor tenolysis and wrist capsulotomy;  Surgeon: Carroll Mccarthy MD;  [facility];  Service: Orthopedics    EYE SURGERY  2021    FOOT SURGERY  2022    Left Foot    FRACTURE SURGERY  1968    Right Tib/Fib    GANGLION CYST EXCISION Left 03/25/2024    Procedure: EXCISION MUCOID CYST, INDEX FINGER DIP;  Surgeon: Carroll Mccarthy MD;  [facility];  Service: Orthopedics    GANGLION CYST EXCISION Right 05/20/2024    Procedure: EXCISION MUCOID CYST - RIGHT INDEX AND MIDDLE FINGERS;  Surgeon: Carroll Mccarthy MD;  [facility];  Service: Orthopedics    HERNIA REPAIR      INCISION AND DRAINAGE  01/16/2024    IR NEPHROURETERAL ACCESS FOR UROLOGY PCNL  04/07/2025    KIDNEY SURGERY Right 06/2012    KNEE SURGERY  Right 1980    MOUTH SURGERY      DE AMPUTATION METATARSAL W/TOE SINGLE Left 6/1/2022    Procedure: RAY RESECTION FOOT;  Surgeon: Hannah Melgoza DPM;  [facility];  Service: Podiatry    DE INCISION & DRAINAGE ABSCESS COMPLICATED/MULTIPLE Right 09/17/2024    Procedure: Irrigation and debridement right wrist and hand, any indicated procedures;  Surgeon: Carroll Mccarthy MD;  [facility];  Service: Orthopedics    DE INCISION EXTENSOR TENDON SHEATH WRIST Right 01/20/2025    Procedure: RELEASE DEQUERVAINS - Right;  Surgeon: Carroll Mccarthy MD;  [facility];  Service: Orthopedics    DE PERQ NL/PL LITHOTRP COMPLEX >2 CM MLT LOCATIONS Right 04/11/2025    Procedure: NEPHROLITHOTOMY  PERCUTANEOUS (PCNL);  Surgeon: Adis Hamilton MD;  [facility];  Service: Urology    DE RPR AA HERNIA 1ST < 3 CM REDUCIBLE N/A 7/14/2023    Procedure: REPAIR HERNIA INCISIONAL;  Surgeon: Matt Melo MD;  [facility];  Service: General    DE SYNOVECTOMY EXTENSOR TENDON SHTH WRIST 1 CMPRT Right 10/03/2024    Procedure: Irrigation and debridement, right wrist, volar and dorsal, possible extensor and flexor tenosynovectomy, any indicated procedures;  Surgeon: Carroll Mccarthy MD;  [facility];  Service: Orthopedics    DE SYNOVECTOMY EXTENSOR TENDON SHTH WRIST 1 CMPRT Right 03/24/2025    Procedure: SYNOVECTOMY / DEBRIDEMENT - FLEXOR CARPI RADIALIS AND FLEXOR CARPI ULNARIS TENDONS- RIGHT;  Surgeon: Carroll Mccarthy MD;  [facility];  Service: Orthopedics    TONSILLECTOMY  1968    Yes    WOUND DEBRIDEMENT Left 4/28/2022    Procedure: Left foot washout;  Surgeon: Hannah Melgoza DPM;  [facility];  Service: Podiatry    WOUND DEBRIDEMENT Left 6/6/2022    Procedure: DEBRIDEMENT WOUND (WASH OUT);  Surgeon: Hannah Melgoza DPM;  [facility];  Service: Podiatry     Family History   Problem Relation Age of Onset    Diabetes Paternal Grandmother      Diabetes Paternal Grandfather     Arthritis Maternal Grandmother      Social History     Tobacco Use    Smoking status: Never    Smokeless tobacco: Never   Vaping Use    Vaping status: Never Used   Substance and Sexual Activity    Alcohol use: Yes     Alcohol/week: 1.0 standard drink of alcohol     Types: 1 Cans of beer per week     Comment: ocassional    Drug use: Never    Sexual activity: Not Currently     Partners: Female     Birth control/protection: Abstinence     Current Outpatient Medications on File Prior to Visit   Medication Sig    acetaminophen (TYLENOL) 500 mg tablet Take 2 tablets (1,000 mg total) by mouth every 8 (eight) hours    amitriptyline (ELAVIL) 10 mg tablet 30mg at bedtime    ammonium lactate (LAC-HYDRIN) 12 % lotion 2 (two) times a day as needed    carvedilol (COREG) 25 mg tablet Take 1 tablet (25 mg total) by mouth 2 (two) times a day with meals    Empagliflozin (Jardiance) 25 MG TABS Take 1 tablet (25 mg total) by mouth daily    ketoconazole (NIZORAL) 2 % cream if needed    lidocaine (Lidoderm) 5 % Apply 1 patch topically over 12 hours daily Remove & Discard patch within 12 hours or as directed by MD    lisinopril-hydrochlorothiazide (PRINZIDE,ZESTORETIC) 20-12.5 MG per tablet Take 1 tablet by mouth 2 (two) times a day    traMADol (Ultram) 50 mg tablet Take 2 tablets (100 mg total) by mouth daily at bedtime as needed for severe pain    Ubrogepant (UBRELVY) 100 MG tablet Take 1 tablet (100 mg) one time as needed for migraine. May repeat one additional tablet (100 mg) at least two hours after the first dose. Do not use more than two doses per day, or for more than twelve days per month.    [DISCONTINUED] methocarbamol (ROBAXIN) 750 mg tablet Take 1 tablet (750 mg total) by mouth daily as needed for muscle spasms    Accu-Chek FastClix Lancets MISC Use to test blood sugar once a day (Patient not taking: Reported on 5/6/2025)    Blood Glucose Monitoring Suppl (Accu-Chek Guide Me) w/Device KIT  Use to check blood sugar once a day (Patient not taking: Reported on 5/6/2025)    celecoxib (CeleBREX) 200 mg capsule Take 1 capsule (200 mg total) by mouth 2 (two) times a day for 5 days    glucose blood (Accu-Chek Guide) test strip Use to check blood sugar once a day (Patient not taking: Reported on 5/6/2025)    Insulin Pen Needle (BD Pen Needle [place] U/F) 32G X 4 MM MISC Use in the morning     Allergies   Allergen Reactions    Cephalexin Hives     Skin peeling  Tolerates penicillins (tolerated unasyn and zosyn)    Metformin GI Intolerance    Pollen Extract Sneezing     Immunization History   Administered Date(s) Administered    Tdap 02/17/2022, 02/17/2022     Objective   /72   Pulse 86   Temp 98.2 °F (36.8 °C) (Tympanic)   Ht 6' (1.829 m)   Wt 128 kg (282 lb 9.6 oz)   SpO2 96%   BMI 38.33 kg/m²     Physical Exam  Vitals and nursing note reviewed.   Constitutional:       General: He is not in acute distress.     Appearance: He is well-developed.   HENT:      Head: Normocephalic and atraumatic.   Eyes:      Conjunctiva/sclera: Conjunctivae normal.   Cardiovascular:      Rate and Rhythm: Normal rate and regular rhythm.      Heart sounds: Normal heart sounds. No murmur heard.  Pulmonary:      Effort: Pulmonary effort is normal. No respiratory distress.      Breath sounds: Normal breath sounds. No wheezing, rhonchi or rales.   Abdominal:      Palpations: Abdomen is soft.   Musculoskeletal:      Cervical back: Neck supple.      Comments: Decrease in motion and hand grasp strength in the right hand from multiple previous surgeries   Skin:     General: Skin is warm and dry.      Capillary Refill: Capillary refill takes less than 2 seconds.   Neurological:      Mental Status: He is alert.   Psychiatric:         Mood and Affect: Mood normal.

## 2025-05-13 NOTE — ASSESSMENT & PLAN NOTE
Lab Results   Component Value Date    EGFR 64 04/12/2025    EGFR 55 04/06/2025    EGFR 53 02/21/2025    CREATININE 1.20 04/12/2025    CREATININE 1.36 (H) 04/06/2025    CREATININE 1.40 (H) 02/21/2025   An updated analysis of kidney performance has been requested for the patient's next visit maintain good hydration avoid nonsteroidal anti-inflammatories and continue tight control of both blood pressure and diabetes    Orders:    Comprehensive metabolic panel; Future

## 2025-05-13 NOTE — ASSESSMENT & PLAN NOTE
Note from neurosurgery appreciated intervention seems to be a high risk endeavor recommend continuation without surgery at this time

## 2025-05-13 NOTE — ASSESSMENT & PLAN NOTE
Recommend continuation of Jardiance at 25 mg daily and follow-up with endocrinology services through [facility].  Avoid concentrated sugars and excessive carbohydrates in the diet is advised  Lab Results   Component Value Date    HGBA1C 10.4 (H) 04/06/2025       Orders:    Comprehensive metabolic panel; Future    Albumin / creatinine urine ratio; Future

## 2025-05-15 ENCOUNTER — OFFICE VISIT (OUTPATIENT)
Dept: OCCUPATIONAL THERAPY | Age: 60
End: 2025-05-15
Payer: MEDICARE

## 2025-05-15 DIAGNOSIS — M25.641 STIFFNESS OF FINGER JOINT OF RIGHT HAND: ICD-10-CM

## 2025-05-15 DIAGNOSIS — M65.949 TENOSYNOVITIS OF FINGER AND HAND: Primary | ICD-10-CM

## 2025-05-15 DIAGNOSIS — L08.9 ANIMAL BITE OF LEFT HAND WITH INFECTION, SUBSEQUENT ENCOUNTER: ICD-10-CM

## 2025-05-15 DIAGNOSIS — S61.452D ANIMAL BITE OF LEFT HAND WITH INFECTION, SUBSEQUENT ENCOUNTER: ICD-10-CM

## 2025-05-15 DIAGNOSIS — M77.8 RIGHT WRIST TENDONITIS: ICD-10-CM

## 2025-05-15 DIAGNOSIS — Z47.89 AFTERCARE FOLLOWING SURGERY OF THE MUSCULOSKELETAL SYSTEM: ICD-10-CM

## 2025-05-15 DIAGNOSIS — M25.631 WRIST STIFFNESS, RIGHT: ICD-10-CM

## 2025-05-15 PROCEDURE — 97110 THERAPEUTIC EXERCISES: CPT

## 2025-05-15 PROCEDURE — 97530 THERAPEUTIC ACTIVITIES: CPT

## 2025-05-15 PROCEDURE — 97140 MANUAL THERAPY 1/> REGIONS: CPT

## 2025-05-15 NOTE — PROGRESS NOTES
Daily Note    Today's date: 5/15/2025  Patient name: Yoni Castillo  : 1965  MRN: [id number]  Referring provider: Yesenia Carrasquillo PA-C  Dx:   Encounter Diagnosis     ICD-10-CM    1. Tenosynovitis of finger and hand  M65.949       2. Animal bite of left hand with infection, subsequent encounter  S61.452D     L08.9       3. Stiffness of finger joint of right hand  M25.641       4. Wrist stiffness, right  M25.631       5. Right wrist tendonitis  M77.8       6. Aftercare following surgery of the musculoskeletal system  Z47.89               Start Time: 1200  Stop Time: 1245  Total time in clinic (min): 45 minutes    Subjective: Pt reports no new complaints       Objective: See treatment diary below      Assessment: Tolerated treatment well. Pt's PROM continues to improve. He can achieve a near-full fist by end of session. Tolerated progression with strengthening.       Plan: Continue with current plan of care.     Precautions: 3/24 FCR and FCU debridement and tenosynovectomy     Manuals  3/27 5/1 re-eval 5/12 5/15    Edema mob   10' 10'    Scar mob    5'    STM   5' 5' 5'                     Ther Ex             Forearm AROM 2x20 2x20 2x20 2x20   Wrist P/AROM 2x20 2x20 2x20 2x20   Tendon glides 2x20 2x20 2x20 2x20   Thumb P/AROM X20 all planes X20 all planes X20 all planes X20 all planes   Ball stretch       Strengthening  Yellow putty squeeze/press/twist Green putty press x20 reps 2#    Flex bar twists x20 Green putty press x20 reps 3#    Wrist bar twists x20                                                        Ther Activity         Grasp/release                                                    Neuro Re-Ed                               Ortho Fit                               Modalities        HP   5' 5' 5'

## 2025-05-17 ENCOUNTER — HOSPITAL ENCOUNTER (INPATIENT)
Facility: HOSPITAL | Age: 60
LOS: 3 days | Discharge: HOME | DRG: 375 | End: 2025-05-20
Attending: EMERGENCY MEDICINE
Payer: MEDICARE

## 2025-05-17 ENCOUNTER — APPOINTMENT (EMERGENCY)
Dept: CT IMAGING | Facility: HOSPITAL | Age: 60
DRG: 375 | End: 2025-05-17
Payer: MEDICARE

## 2025-05-17 DIAGNOSIS — R13.10 ODYNOPHAGIA: ICD-10-CM

## 2025-05-17 DIAGNOSIS — Z13.9 ENCOUNTER FOR SCREENING INVOLVING SOCIAL DETERMINANTS OF HEALTH (SDOH): ICD-10-CM

## 2025-05-17 DIAGNOSIS — K20.90 ESOPHAGITIS: Primary | ICD-10-CM

## 2025-05-17 DIAGNOSIS — R13.19 OTHER DYSPHAGIA: ICD-10-CM

## 2025-05-17 DIAGNOSIS — K22.89 ESOPHAGEAL MASS: ICD-10-CM

## 2025-05-17 PROBLEM — E87.1 HYPONATREMIA: Status: ACTIVE | Noted: 2025-05-17

## 2025-05-17 LAB
ALBUMIN SERPL BCG-MCNC: 4.3 G/DL (ref 3.5–5)
ALP SERPL-CCNC: 61 U/L (ref 34–104)
ALT SERPL W P-5'-P-CCNC: 16 U/L (ref 7–52)
ANION GAP SERPL CALCULATED.3IONS-SCNC: 7 MMOL/L (ref 4–13)
AST SERPL W P-5'-P-CCNC: 15 U/L (ref 13–39)
BASOPHILS # BLD AUTO: 0.03 THOUSANDS/ÂΜL (ref 0–0.1)
BASOPHILS NFR BLD AUTO: 1 % (ref 0–1)
BILIRUB SERPL-MCNC: 0.36 MG/DL (ref 0.2–1)
BUN SERPL-MCNC: 29 MG/DL (ref 5–25)
CALCIUM SERPL-MCNC: 9 MG/DL (ref 8.4–10.2)
CHLORIDE SERPL-SCNC: 96 MMOL/L (ref 96–108)
CO2 SERPL-SCNC: 26 MMOL/L (ref 21–32)
CREAT SERPL-MCNC: 1.38 MG/DL (ref 0.6–1.3)
EOSINOPHIL # BLD AUTO: 0.15 THOUSAND/ÂΜL (ref 0–0.61)
EOSINOPHIL NFR BLD AUTO: 3 % (ref 0–6)
ERYTHROCYTE [DISTWIDTH] IN BLOOD BY AUTOMATED COUNT: 12.6 % (ref 11.6–15.1)
GFR SERPL CREATININE-BSD FRML MDRD: 54 ML/MIN/1.73SQ M
GLUCOSE SERPL-MCNC: 236 MG/DL (ref 65–140)
HCT VFR BLD AUTO: 41.2 % (ref 36.5–49.3)
HGB BLD-MCNC: 13.7 G/DL (ref 12–17)
IMM GRANULOCYTES # BLD AUTO: 0.01 THOUSAND/UL (ref 0–0.2)
IMM GRANULOCYTES NFR BLD AUTO: 0 % (ref 0–2)
LYMPHOCYTES # BLD AUTO: 0.88 THOUSANDS/ÂΜL (ref 0.6–4.47)
LYMPHOCYTES NFR BLD AUTO: 20 % (ref 14–44)
MCH RBC QN AUTO: 30 PG (ref 26.8–34.3)
MCHC RBC AUTO-ENTMCNC: 33.3 G/DL (ref 31.4–37.4)
MCV RBC AUTO: 90 FL (ref 82–98)
MONOCYTES # BLD AUTO: 0.48 THOUSAND/ÂΜL (ref 0.17–1.22)
MONOCYTES NFR BLD AUTO: 11 % (ref 4–12)
NEUTROPHILS # BLD AUTO: 2.92 THOUSANDS/ÂΜL (ref 1.85–7.62)
NEUTS SEG NFR BLD AUTO: 65 % (ref 43–75)
NRBC BLD AUTO-RTO: 0 /100 WBCS
PLATELET # BLD AUTO: 233 THOUSANDS/UL (ref 149–390)
PMV BLD AUTO: 9.9 FL (ref 8.9–12.7)
POTASSIUM SERPL-SCNC: 4.9 MMOL/L (ref 3.5–5.3)
PROT SERPL-MCNC: 7.7 G/DL (ref 6.4–8.4)
RBC # BLD AUTO: 4.57 MILLION/UL (ref 3.88–5.62)
SODIUM SERPL-SCNC: 129 MMOL/L (ref 135–147)
WBC # BLD AUTO: 4.47 THOUSAND/UL (ref 4.31–10.16)

## 2025-05-17 PROCEDURE — 71260 CT THORAX DX C+: CPT

## 2025-05-17 PROCEDURE — 96375 TX/PRO/DX INJ NEW DRUG ADDON: CPT

## 2025-05-17 PROCEDURE — 80053 COMPREHEN METABOLIC PANEL: CPT

## 2025-05-17 PROCEDURE — 36415 COLL VENOUS BLD VENIPUNCTURE: CPT

## 2025-05-17 PROCEDURE — 85025 COMPLETE CBC W/AUTO DIFF WBC: CPT

## 2025-05-17 PROCEDURE — 74177 CT ABD & PELVIS W/CONTRAST: CPT

## 2025-05-17 PROCEDURE — 96374 THER/PROPH/DIAG INJ IV PUSH: CPT

## 2025-05-17 PROCEDURE — 99284 EMERGENCY DEPT VISIT MOD MDM: CPT

## 2025-05-17 PROCEDURE — 99223 1ST HOSP IP/OBS HIGH 75: CPT

## 2025-05-17 RX ORDER — HYDROCHLOROTHIAZIDE 12.5 MG/1
12.5 TABLET ORAL 2 TIMES DAILY
Status: DISCONTINUED | OUTPATIENT
Start: 2025-05-18 | End: 2025-05-18

## 2025-05-17 RX ORDER — ONDANSETRON 2 MG/ML
4 INJECTION INTRAMUSCULAR; INTRAVENOUS EVERY 6 HOURS PRN
Status: DISCONTINUED | OUTPATIENT
Start: 2025-05-17 | End: 2025-05-20 | Stop reason: HOSPADM

## 2025-05-17 RX ORDER — CARVEDILOL 25 MG/1
25 TABLET ORAL 2 TIMES DAILY WITH MEALS
Status: DISCONTINUED | OUTPATIENT
Start: 2025-05-18 | End: 2025-05-18

## 2025-05-17 RX ORDER — MAGNESIUM HYDROXIDE/ALUMINUM HYDROXICE/SIMETHICONE 120; 1200; 1200 MG/30ML; MG/30ML; MG/30ML
30 SUSPENSION ORAL ONCE
Status: DISCONTINUED | OUTPATIENT
Start: 2025-05-17 | End: 2025-05-20 | Stop reason: HOSPADM

## 2025-05-17 RX ORDER — ACETAMINOPHEN 325 MG/1
650 TABLET ORAL EVERY 6 HOURS PRN
Status: DISCONTINUED | OUTPATIENT
Start: 2025-05-17 | End: 2025-05-17

## 2025-05-17 RX ORDER — FAMOTIDINE 10 MG/ML
20 INJECTION, SOLUTION INTRAVENOUS ONCE
Status: COMPLETED | OUTPATIENT
Start: 2025-05-17 | End: 2025-05-17

## 2025-05-17 RX ORDER — INSULIN LISPRO 100 [IU]/ML
1-6 INJECTION, SOLUTION INTRAVENOUS; SUBCUTANEOUS EVERY 6 HOURS SCHEDULED
Status: DISCONTINUED | OUTPATIENT
Start: 2025-05-18 | End: 2025-05-20 | Stop reason: HOSPADM

## 2025-05-17 RX ORDER — SODIUM CHLORIDE 9 MG/ML
100 INJECTION, SOLUTION INTRAVENOUS CONTINUOUS
Status: DISCONTINUED | OUTPATIENT
Start: 2025-05-17 | End: 2025-05-20

## 2025-05-17 RX ORDER — LISINOPRIL 20 MG/1
20 TABLET ORAL 2 TIMES DAILY
Status: DISCONTINUED | OUTPATIENT
Start: 2025-05-18 | End: 2025-05-18

## 2025-05-17 RX ORDER — OXYCODONE HYDROCHLORIDE 5 MG/1
5 TABLET ORAL EVERY 6 HOURS PRN
Refills: 0 | Status: DISCONTINUED | OUTPATIENT
Start: 2025-05-17 | End: 2025-05-18

## 2025-05-17 RX ORDER — ONDANSETRON 2 MG/ML
4 INJECTION INTRAMUSCULAR; INTRAVENOUS ONCE
Status: COMPLETED | OUTPATIENT
Start: 2025-05-17 | End: 2025-05-17

## 2025-05-17 RX ORDER — PANTOPRAZOLE SODIUM 40 MG/10ML
40 INJECTION, POWDER, LYOPHILIZED, FOR SOLUTION INTRAVENOUS EVERY 12 HOURS SCHEDULED
Status: DISCONTINUED | OUTPATIENT
Start: 2025-05-17 | End: 2025-05-20

## 2025-05-17 RX ORDER — SUCRALFATE 1 G/1
1 TABLET ORAL ONCE
Status: DISCONTINUED | OUTPATIENT
Start: 2025-05-17 | End: 2025-05-20 | Stop reason: HOSPADM

## 2025-05-17 RX ORDER — AMITRIPTYLINE HYDROCHLORIDE 10 MG/1
30 TABLET ORAL
Status: DISCONTINUED | OUTPATIENT
Start: 2025-05-17 | End: 2025-05-20 | Stop reason: HOSPADM

## 2025-05-17 RX ORDER — ACETAMINOPHEN 325 MG/1
975 TABLET ORAL EVERY 8 HOURS
Status: DISCONTINUED | OUTPATIENT
Start: 2025-05-17 | End: 2025-05-18

## 2025-05-17 RX ORDER — HEPARIN SODIUM 5000 [USP'U]/ML
5000 INJECTION, SOLUTION INTRAVENOUS; SUBCUTANEOUS EVERY 8 HOURS SCHEDULED
Status: DISCONTINUED | OUTPATIENT
Start: 2025-05-17 | End: 2025-05-20 | Stop reason: HOSPADM

## 2025-05-17 RX ADMIN — ONDANSETRON 4 MG: 2 INJECTION INTRAMUSCULAR; INTRAVENOUS at 19:05

## 2025-05-17 RX ADMIN — SODIUM CHLORIDE 1000 ML: 0.9 INJECTION, SOLUTION INTRAVENOUS at 22:01

## 2025-05-17 RX ADMIN — IOHEXOL 100 ML: 350 INJECTION, SOLUTION INTRAVENOUS at 21:03

## 2025-05-17 RX ADMIN — PANTOPRAZOLE SODIUM 40 MG: 40 INJECTION, POWDER, LYOPHILIZED, FOR SOLUTION INTRAVENOUS at 23:23

## 2025-05-17 RX ADMIN — FAMOTIDINE 20 MG: 10 INJECTION, SOLUTION INTRAVENOUS at 20:42

## 2025-05-17 RX ADMIN — SODIUM CHLORIDE 100 ML/HR: 0.9 INJECTION, SOLUTION INTRAVENOUS at 22:55

## 2025-05-18 LAB
ALBUMIN SERPL BCG-MCNC: 3.8 G/DL (ref 3.5–5)
ALP SERPL-CCNC: 43 U/L (ref 34–104)
ALT SERPL W P-5'-P-CCNC: 13 U/L (ref 7–52)
ANION GAP SERPL CALCULATED.3IONS-SCNC: 6 MMOL/L (ref 4–13)
AST SERPL W P-5'-P-CCNC: 12 U/L (ref 13–39)
BILIRUB SERPL-MCNC: 0.39 MG/DL (ref 0.2–1)
BUN SERPL-MCNC: 26 MG/DL (ref 5–25)
CALCIUM SERPL-MCNC: 8.6 MG/DL (ref 8.4–10.2)
CHLORIDE SERPL-SCNC: 101 MMOL/L (ref 96–108)
CO2 SERPL-SCNC: 24 MMOL/L (ref 21–32)
CREAT SERPL-MCNC: 1.31 MG/DL (ref 0.6–1.3)
ERYTHROCYTE [DISTWIDTH] IN BLOOD BY AUTOMATED COUNT: 12.7 % (ref 11.6–15.1)
GFR SERPL CREATININE-BSD FRML MDRD: 57 ML/MIN/1.73SQ M
GLUCOSE SERPL-MCNC: 164 MG/DL (ref 65–140)
GLUCOSE SERPL-MCNC: 175 MG/DL (ref 65–140)
GLUCOSE SERPL-MCNC: 179 MG/DL (ref 65–140)
GLUCOSE SERPL-MCNC: 187 MG/DL (ref 65–140)
GLUCOSE SERPL-MCNC: 211 MG/DL (ref 65–140)
GLUCOSE SERPL-MCNC: 246 MG/DL (ref 65–140)
GLUCOSE SERPL-MCNC: 339 MG/DL (ref 65–140)
HCT VFR BLD AUTO: 39.3 % (ref 36.5–49.3)
HGB BLD-MCNC: 12.9 G/DL (ref 12–17)
MAGNESIUM SERPL-MCNC: 2.2 MG/DL (ref 1.9–2.7)
MCH RBC QN AUTO: 30.6 PG (ref 26.8–34.3)
MCHC RBC AUTO-ENTMCNC: 32.8 G/DL (ref 31.4–37.4)
MCV RBC AUTO: 93 FL (ref 82–98)
PLATELET # BLD AUTO: 212 THOUSANDS/UL (ref 149–390)
PMV BLD AUTO: 10.1 FL (ref 8.9–12.7)
POTASSIUM SERPL-SCNC: 4.6 MMOL/L (ref 3.5–5.3)
PROT SERPL-MCNC: 6.8 G/DL (ref 6.4–8.4)
RBC # BLD AUTO: 4.21 MILLION/UL (ref 3.88–5.62)
SODIUM SERPL-SCNC: 131 MMOL/L (ref 135–147)
WBC # BLD AUTO: 4.73 THOUSAND/UL (ref 4.31–10.16)

## 2025-05-18 PROCEDURE — 85027 COMPLETE CBC AUTOMATED: CPT

## 2025-05-18 PROCEDURE — 82948 REAGENT STRIP/BLOOD GLUCOSE: CPT

## 2025-05-18 PROCEDURE — 80053 COMPREHEN METABOLIC PANEL: CPT

## 2025-05-18 PROCEDURE — 83735 ASSAY OF MAGNESIUM: CPT

## 2025-05-18 PROCEDURE — 99232 SBSQ HOSP IP/OBS MODERATE 35: CPT | Performed by: PHYSICIAN ASSISTANT

## 2025-05-18 RX ORDER — CARVEDILOL 25 MG/1
25 TABLET ORAL 2 TIMES DAILY WITH MEALS
Status: DISCONTINUED | OUTPATIENT
Start: 2025-05-18 | End: 2025-05-20 | Stop reason: HOSPADM

## 2025-05-18 RX ORDER — SUMATRIPTAN 6 MG/.5ML
6 INJECTION, SOLUTION SUBCUTANEOUS
Status: DISCONTINUED | OUTPATIENT
Start: 2025-05-18 | End: 2025-05-20 | Stop reason: HOSPADM

## 2025-05-18 RX ORDER — LISINOPRIL 20 MG/1
20 TABLET ORAL 2 TIMES DAILY
Status: DISCONTINUED | OUTPATIENT
Start: 2025-05-18 | End: 2025-05-20 | Stop reason: HOSPADM

## 2025-05-18 RX ORDER — ACETAMINOPHEN 325 MG/1
650 TABLET ORAL EVERY 8 HOURS SCHEDULED
Status: DISCONTINUED | OUTPATIENT
Start: 2025-05-18 | End: 2025-05-20 | Stop reason: HOSPADM

## 2025-05-18 RX ORDER — DIPHENHYDRAMINE HYDROCHLORIDE 50 MG/ML
25 INJECTION, SOLUTION INTRAMUSCULAR; INTRAVENOUS ONCE
Status: COMPLETED | OUTPATIENT
Start: 2025-05-18 | End: 2025-05-18

## 2025-05-18 RX ORDER — MAGNESIUM SULFATE HEPTAHYDRATE 40 MG/ML
2 INJECTION, SOLUTION INTRAVENOUS ONCE
Status: COMPLETED | OUTPATIENT
Start: 2025-05-18 | End: 2025-05-18

## 2025-05-18 RX ORDER — DEXAMETHASONE SODIUM PHOSPHATE 10 MG/ML
10 INJECTION, SOLUTION INTRAMUSCULAR; INTRAVENOUS ONCE
Status: COMPLETED | OUTPATIENT
Start: 2025-05-18 | End: 2025-05-18

## 2025-05-18 RX ORDER — TRAMADOL HYDROCHLORIDE 50 MG/1
100 TABLET ORAL
Status: DISCONTINUED | OUTPATIENT
Start: 2025-05-18 | End: 2025-05-20 | Stop reason: HOSPADM

## 2025-05-18 RX ORDER — METOCLOPRAMIDE HYDROCHLORIDE 5 MG/ML
10 INJECTION INTRAMUSCULAR; INTRAVENOUS ONCE
Status: COMPLETED | OUTPATIENT
Start: 2025-05-18 | End: 2025-05-18

## 2025-05-18 RX ORDER — HYDROCHLOROTHIAZIDE 12.5 MG/1
12.5 TABLET ORAL 2 TIMES DAILY
Status: DISCONTINUED | OUTPATIENT
Start: 2025-05-18 | End: 2025-05-20 | Stop reason: HOSPADM

## 2025-05-18 RX ADMIN — PANTOPRAZOLE SODIUM 40 MG: 40 INJECTION, POWDER, LYOPHILIZED, FOR SOLUTION INTRAVENOUS at 08:43

## 2025-05-18 RX ADMIN — TRAMADOL HYDROCHLORIDE 100 MG: 50 TABLET, COATED ORAL at 00:57

## 2025-05-18 RX ADMIN — AMITRIPTYLINE HYDROCHLORIDE 30 MG: 10 TABLET, FILM COATED ORAL at 00:57

## 2025-05-18 RX ADMIN — MAGNESIUM SULFATE HEPTAHYDRATE 2 G: 40 INJECTION, SOLUTION INTRAVENOUS at 11:55

## 2025-05-18 RX ADMIN — DIPHENHYDRAMINE HYDROCHLORIDE 25 MG: 50 INJECTION, SOLUTION INTRAMUSCULAR; INTRAVENOUS at 11:55

## 2025-05-18 RX ADMIN — TRAMADOL HYDROCHLORIDE 100 MG: 50 TABLET, COATED ORAL at 22:17

## 2025-05-18 RX ADMIN — AMITRIPTYLINE HYDROCHLORIDE 30 MG: 10 TABLET, FILM COATED ORAL at 22:17

## 2025-05-18 RX ADMIN — ACETAMINOPHEN 650 MG: 325 TABLET, FILM COATED ORAL at 22:17

## 2025-05-18 RX ADMIN — ACETAMINOPHEN 975 MG: 325 TABLET, FILM COATED ORAL at 00:59

## 2025-05-18 RX ADMIN — ACETAMINOPHEN 975 MG: 325 TABLET, FILM COATED ORAL at 07:24

## 2025-05-18 RX ADMIN — HYDROCHLOROTHIAZIDE 12.5 MG: 12.5 TABLET ORAL at 00:58

## 2025-05-18 RX ADMIN — CARVEDILOL 25 MG: 25 TABLET, FILM COATED ORAL at 00:58

## 2025-05-18 RX ADMIN — PANTOPRAZOLE SODIUM 40 MG: 40 INJECTION, POWDER, LYOPHILIZED, FOR SOLUTION INTRAVENOUS at 20:42

## 2025-05-18 RX ADMIN — DEXAMETHASONE SODIUM PHOSPHATE 10 MG: 10 INJECTION, SOLUTION INTRAMUSCULAR; INTRAVENOUS at 11:55

## 2025-05-18 RX ADMIN — SODIUM CHLORIDE 100 ML/HR: 0.9 INJECTION, SOLUTION INTRAVENOUS at 20:42

## 2025-05-18 RX ADMIN — CARVEDILOL 25 MG: 25 TABLET, FILM COATED ORAL at 07:24

## 2025-05-18 RX ADMIN — HYDROCHLOROTHIAZIDE 12.5 MG: 12.5 TABLET ORAL at 08:42

## 2025-05-18 RX ADMIN — LISINOPRIL 20 MG: 20 TABLET ORAL at 00:58

## 2025-05-18 RX ADMIN — LISINOPRIL 20 MG: 20 TABLET ORAL at 08:42

## 2025-05-18 RX ADMIN — LISINOPRIL 20 MG: 20 TABLET ORAL at 20:46

## 2025-05-18 RX ADMIN — CARVEDILOL 25 MG: 25 TABLET, FILM COATED ORAL at 16:51

## 2025-05-18 RX ADMIN — METOCLOPRAMIDE 10 MG: 5 INJECTION, SOLUTION INTRAMUSCULAR; INTRAVENOUS at 11:55

## 2025-05-18 NOTE — H&P
"H&P - Hospitalist   Name: Yoni Castillo 60 y.o. male I MRN: [id number]  Unit/Bed#: ED-23 I Date of Admission: 5/17/2025   Date of Service: 5/17/2025 I Hospital Day: 0     Assessment & Plan  Odynophagia  Patient is very pleasant  Claims that he has had difficulty swallowing both solids and liquids.  Recently liquids have been regurgitated after attempting to swallow  CT chest abdomen pelvis shows mild wall thickening and hyperenhancement of the distal thoracic esophagus compatible with esophagitis  Patient has drinking history and also drinks alcohol  No previous EGD as noted in the chart    Plan:  PPI twice daily  Continue IV fluids  GI consult  Patient also takes tramadol mostly at bedtime.  Will order his home dose    Hyponatremia  Recent Labs     05/17/25  1906   SODIUM 129*     No results found for: \"OSMOUA\", \"NAUR\", \"OSMOLALITSER\"    Likely hypovolemia  Continue IV fluids normal saline  Recheck tomorrow    Essential hypertension  Continue bp meds  Senna pro and hydrochlorothiazide    Type 2 diabetes mellitus with chronic kidney disease, without long-term current use of insulin ([facility])  Lab Results   Component Value Date    HGBA1C 10.4 (H) 04/06/2025       No results for input(s): \"POCGLU\" in the last 72 hours.    Blood Sugar Average: Last 72 hrs:  Controlled  Sliding scale every 6 hours  Continue Jardiance      Class 2 severe obesity with serious comorbidity in adult ([facility])  Affects all aspects of care  Chronic kidney disease, stage 3 ([facility])  Lab Results   Component Value Date    EGFR 54 05/17/2025    EGFR 64 04/12/2025    EGFR 55 04/06/2025    CREATININE 1.38 (H) 05/17/2025    CREATININE 1.20 04/12/2025    CREATININE 1.36 (H) 04/06/2025   Currently at baseline continue to monitor    JEWEL (obstructive sleep apnea)  Not currently on CPAP.      VTE Pharmacologic Prophylaxis:   Moderate Risk (Score 3-4) - Pharmacological DVT Prophylaxis Ordered: heparin.  Code Status: Level 1 full code  Discussion with family: Updated "  (significant other) at bedside.    Anticipated Length of Stay: Patient will be admitted on an inpatient basis with an anticipated length of stay of greater than 2 midnights secondary to difficulty swallowing poor oral intake, dysphagia.    History of Present Illness   Chief Complaint: Odynophagia    Yoni Castillo is a 60 y.o. male with a PMH of past medical history of type 2 diabetes, obesity, hypertension, CKD stage II coming in for difficulty swallowing.    Review of Systems   Constitutional:  Negative for chills and fever.   HENT:  Negative for ear pain and sore throat.    Eyes:  Negative for pain and visual disturbance.   Respiratory:  Negative for cough and shortness of breath.    Cardiovascular:  Negative for chest pain and palpitations.   Gastrointestinal:  Negative for abdominal pain and vomiting.   Genitourinary:  Negative for dysuria and hematuria.   Musculoskeletal:  Negative for arthralgias and back pain.   Skin:  Negative for color change and rash.   Neurological:  Negative for seizures and syncope.   All other systems reviewed and are negative.      Historical Information   Past Medical History:   Diagnosis Date    Allergic 1968    Seasonal    Arthritis 2012    Brain concussion Multiple    Cervical disc disease     Chronic kidney disease 2012    Chronic pain disorder     CTS (carpal tunnel syndrome) 2002    Diabetes mellitus (HCC)     Full dentures     only using upper    Head injury 1975    Headache(784.0) 1980    High blood sugar     Hypertension     Kidney stone     Liver disease     Low back pain     Cannot remember    Lumbar disc disease     Lumbosacral disc disease     Cannot remember    Migraines     Neuropathy in diabetes (HCC)     RLS (restless legs syndrome)     Skin cancer     in past    Thoracic disc disorder     Cannot remember    Visual impairment 2017    Cataracts. Surgery to correct in 2021     Past Surgical History:   Procedure Laterality Date    AMPUTATION  2022     Little toe left    CARPAL TUNNEL RELEASE  2002    CATARACT EXTRACTION Bilateral     COLONOSCOPY      CONTRACTURE Right 01/20/2025    Procedure: Right wrist and hand extensor tenolysis and wrist capsulotomy;  Surgeon: Carroll Mccarthy MD;  [facility];  Service: Orthopedics    EYE SURGERY  2021    FOOT SURGERY  2022    Left Foot    FRACTURE SURGERY  1968    Right Tib/Fib    GANGLION CYST EXCISION Left 03/25/2024    Procedure: EXCISION MUCOID CYST, INDEX FINGER DIP;  Surgeon: Carroll Mccarthy MD;  [facility];  Service: Orthopedics    GANGLION CYST EXCISION Right 05/20/2024    Procedure: EXCISION MUCOID CYST - RIGHT INDEX AND MIDDLE FINGERS;  Surgeon: Carroll Mccarthy MD;  [facility];  Service: Orthopedics    HERNIA REPAIR      INCISION AND DRAINAGE  01/16/2024    IR NEPHROURETERAL ACCESS FOR UROLOGY PCNL  04/07/2025    KIDNEY SURGERY Right 06/2012    KNEE SURGERY Right 1980    MOUTH SURGERY      NY AMPUTATION METATARSAL W/TOE SINGLE Left 6/1/2022    Procedure: RAY RESECTION FOOT;  Surgeon: Hannah Melgoza DPM;  [facility];  Service: Podiatry    NY INCISION & DRAINAGE ABSCESS COMPLICATED/MULTIPLE Right 09/17/2024    Procedure: Irrigation and debridement right wrist and hand, any indicated procedures;  Surgeon: Carroll Mccarthy MD;  [facility];  Service: Orthopedics    NY INCISION EXTENSOR TENDON SHEATH WRIST Right 01/20/2025    Procedure: RELEASE DEQUERVAINS - Right;  Surgeon: Carroll Mccarthy MD;  [facility];  Service: Orthopedics    NY PERQ NL/PL LITHOTRP COMPLEX >2 CM MLT LOCATIONS Right 04/11/2025    Procedure: NEPHROLITHOTOMY  PERCUTANEOUS (PCNL);  Surgeon: Adis Hamilton MD;  [facility];  Service: Urology    NY RPR AA HERNIA 1ST < 3 CM REDUCIBLE N/A 7/14/2023    Procedure: REPAIR HERNIA INCISIONAL;  Surgeon: Matt Melo MD;  [facility];  Service: General    NY SYNOVECTOMY EXTENSOR TENDON SHTH  WRIST 1 CMPRT Right 10/03/2024    Procedure: Irrigation and debridement, right wrist, volar and dorsal, possible extensor and flexor tenosynovectomy, any indicated procedures;  Surgeon: Carroll Mccarthy MD;  [facility];  Service: Orthopedics    NV SYNOVECTOMY EXTENSOR TENDON SHTH WRIST 1 CMPRT Right 03/24/2025    Procedure: SYNOVECTOMY / DEBRIDEMENT - FLEXOR CARPI RADIALIS AND FLEXOR CARPI ULNARIS TENDONS- RIGHT;  Surgeon: Carroll Mccarthy MD;  [facility];  Service: Orthopedics    TONSILLECTOMY  1968    Yes    WOUND DEBRIDEMENT Left 4/28/2022    Procedure: Left foot washout;  Surgeon: Hannah Melgoza DPM;  [facility];  Service: Podiatry    WOUND DEBRIDEMENT Left 6/6/2022    Procedure: DEBRIDEMENT WOUND (WASH OUT);  Surgeon: Hannah Melgoza DPM;  [facility];  Service: Podiatry     Social History     Tobacco Use    Smoking status: Never    Smokeless tobacco: Never   Vaping Use    Vaping status: Never Used   Substance and Sexual Activity    Alcohol use: Yes     Alcohol/week: 1.0 standard drink of alcohol     Types: 1 Cans of beer per week     Comment: ocassional    Drug use: Never    Sexual activity: Not Currently     Partners: Female     Birth control/protection: Abstinence     E-Cigarette/Vaping    E-Cigarette Use Never User      E-Cigarette/Vaping Substances    Nicotine No     THC No     CBD No     Flavoring No     Other No     Unknown No      Family history non-contributory  Social History:  Marital Status: Registered Domestic Partner   Occupation: None  Patient Pre-hospital Living Situation: Home  Patient Pre-hospital Level of Mobility: walks  Patient Pre-hospital Diet Restrictions: None    Meds/Allergies   I have reviewed home medications with patient personally.  Prior to Admission medications    Medication Sig Start Date End Date Taking? Authorizing Provider   acetaminophen (TYLENOL) 500 mg tablet Take 2 tablets (1,000 mg total) by mouth every 8 (eight) hours  3/24/25  Yes Yesenia Carrasquillo PA-C   amitriptyline (ELAVIL) 10 mg tablet 30mg at bedtime 4/24/25  Yes JAN Cruz   ammonium lactate (LAC-HYDRIN) 12 % lotion as needed in the morning and as needed in the evening. 1/25/24  Yes Jaclyn Granado MD   carvedilol (COREG) 25 mg tablet Take 1 tablet (25 mg total) by mouth 2 (two) times a day with meals 2/25/25  Yes Judd Ji MD   Empagliflozin (Jardiance) 25 MG TABS Take 1 tablet (25 mg total) by mouth daily 2/24/25  Yes Marissa Meyer MD   lidocaine (Lidoderm) 5 % Apply 1 patch topically over 12 hours daily Remove & Discard patch within 12 hours or as directed by MD 2/25/25  Yes Judd Ji MD   lisinopril-hydrochlorothiazide (PRINZIDE,ZESTORETIC) 20-12.5 MG per tablet Take 1 tablet by mouth 2 (two) times a day 2/25/25  Yes Judd Ji MD   methocarbamol (ROBAXIN) 750 mg tablet Take 2 tablets (1,500 mg total) by mouth every 8 (eight) hours 5/13/25  Yes Judd Ji MD   traMADol (Ultram) 50 mg tablet Take 2 tablets (100 mg total) by mouth daily at bedtime as needed for severe pain 4/21/25  Yes Judd Ji MD   Ubrogepant (UBRELVY) 100 MG tablet Take 1 tablet (100 mg) one time as needed for migraine. May repeat one additional tablet (100 mg) at least two hours after the first dose. Do not use more than two doses per day, or for more than twelve days per month. 4/24/25  Yes JAN Cruz   Accu-Chek FastClix Lancets MISC Use to test blood sugar once a day  Patient not taking: Reported on 5/6/2025 6/5/23   Juanita Lowe MD   Blood Glucose Monitoring Suppl (Accu-Chek Guide Me) w/Device KIT Use to check blood sugar once a day  Patient not taking: Reported on 5/6/2025 1/4/23   Angelito Christine MD   celecoxib (CeleBREX) 200 mg capsule Take 1 capsule (200 mg total) by mouth 2 (two) times a day for 5 days 4/12/25 4/17/25  JAN Walls   glucose blood (Accu-Chek Guide) test strip Use to check  blood sugar once a day  Patient not taking: Reported on 5/6/2025 6/5/23   Juanita Lowe MD   Insulin Pen Needle (BD Pen Needle [place] U/F) 32G X 4 MM MISC Use in the morning  Patient not taking: Reported on 5/17/2025 4/7/25   Marissa Meyer MD   ketoconazole (NIZORAL) 2 % cream if needed 1/25/24   Historical Provider, MD     Allergies   Allergen Reactions    Cephalexin Hives     Skin peeling  Tolerates penicillins (tolerated unasyn and zosyn)    Metformin GI Intolerance    Pollen Extract Sneezing       Objective :  Temp:  [98.3 °F (36.8 °C)] 98.3 °F (36.8 °C)  HR:  [78-80] 80  BP: (140-182)/() 140/81  Resp:  [18] 18  SpO2:  [95 %-96 %] 96 %  O2 Device: None (Room air)    Physical Exam  Vitals and nursing note reviewed.   Constitutional:       Appearance: He is well-developed and normal weight.   HENT:      Head: Normocephalic and atraumatic.      Nose: Nose normal.      Mouth/Throat:      Mouth: Mucous membranes are moist.     Eyes:      Conjunctiva/sclera: Conjunctivae normal.       Cardiovascular:      Rate and Rhythm: Normal rate and regular rhythm.      Heart sounds: Normal heart sounds. No murmur heard.  Pulmonary:      Effort: Pulmonary effort is normal. No respiratory distress.      Breath sounds: Normal breath sounds.   Abdominal:      General: Abdomen is flat.      Palpations: Abdomen is soft.      Tenderness: There is no abdominal tenderness.     Musculoskeletal:         General: No swelling.      Cervical back: Neck supple.      Right lower leg: No edema.      Left lower leg: No edema.     Skin:     General: Skin is warm and dry.      Capillary Refill: Capillary refill takes less than 2 seconds.     Neurological:      General: No focal deficit present.      Mental Status: He is alert and oriented to person, place, and time. Mental status is at baseline.     Psychiatric:         Mood and Affect: Mood normal.          Lines/Drains:            Lab Results: I have reviewed the following results:  Results  from last 7 days   Lab Units 05/17/25  1906   WBC Thousand/uL 4.47   HEMOGLOBIN g/dL 13.7   HEMATOCRIT % 41.2   PLATELETS Thousands/uL 233   SEGS PCT % 65   LYMPHO PCT % 20   MONO PCT % 11   EOS PCT % 3     Results from last 7 days   Lab Units 05/17/25  1906   SODIUM mmol/L 129*   POTASSIUM mmol/L 4.9   CHLORIDE mmol/L 96   CO2 mmol/L 26   BUN mg/dL 29*   CREATININE mg/dL 1.38*   ANION GAP mmol/L 7   CALCIUM mg/dL 9.0   ALBUMIN g/dL 4.3   TOTAL BILIRUBIN mg/dL 0.36   ALK PHOS U/L 61   ALT U/L 16   AST U/L 15   GLUCOSE RANDOM mg/dL 236*             Lab Results   Component Value Date    HGBA1C 10.4 (H) 04/06/2025    HGBA1C 8.8 (H) 02/21/2025    HGBA1C 6.4 10/30/2024           Imaging Results Review: I personally reviewed the following image studies/reports in PACS and discussed pertinent findings with Radiology: CT abdomen/pelvis. My interpretation of the radiology images/reports is: Esophagitis.  Other Study Results Review: EKG was reviewed.     Administrative Statements   I have spent a total time of 80 minutes in caring for this patient on the day of the visit/encounter including Diagnostic results, Prognosis, Risks and benefits of tx options, Instructions for management, Patient and family education, Importance of tx compliance, Risk factor reductions, Impressions, Counseling / Coordination of care, Documenting in the medical record, Reviewing/placing orders in the medical record (including tests, medications, and/or procedures), Obtaining or reviewing history  , and Communicating with other healthcare professionals .    ** Please Note: This note has been constructed using a voice recognition system. **

## 2025-05-18 NOTE — ASSESSMENT & PLAN NOTE
Lab Results   Component Value Date    EGFR 54 05/17/2025    EGFR 64 04/12/2025    EGFR 55 04/06/2025    CREATININE 1.38 (H) 05/17/2025    CREATININE 1.20 04/12/2025    CREATININE 1.36 (H) 04/06/2025   Currently at baseline continue to monitor

## 2025-05-18 NOTE — ASSESSMENT & PLAN NOTE
Recent Labs     05/17/25  1906 05/18/25  0512   SODIUM 129* 131*       Does have mild hyponatremia with sodium 129 at time of admission, improved to 131 with IV fluids  Likely secondary to hypovolemia in the setting of poor oral intake  Continue IV fluids pending improved oral intake

## 2025-05-18 NOTE — ASSESSMENT & PLAN NOTE
BP elevated at time of admission, improved this morning  Maintained outpatient on Coreg, lisinopril and HCTZ  Hold HCTZ given hyponatremia

## 2025-05-18 NOTE — ED PROVIDER NOTES
Time reflects when diagnosis was documented in both MDM as applicable and the Disposition within this note       Time User Action Codes Description Comment    5/17/2025 10:03 PM Breanne Cardona Add [K20.90] Esophagitis     5/17/2025 10:37 PM Bandar Escobedo Add [R13.10] Odynophagia     5/17/2025 11:09 PM Monica Calvo Add [Z13.9] Encounter for screening involving social determinants of health (SDoH)           ED Disposition       ED Disposition   Admit    Condition   Stable    Date/Time   Sat May 17, 2025 10:03 PM    Comment   Case was discussed with LYLY and the patient's admission status was agreed to be Admission Status: inpatient status to the service of Dr. Escobedo .               Assessment & Plan       Medical Decision Making  Yoni Castillo is a 60 y.o. who presents with complaints of weeks of odynophagia, nausea, vomiting    Vital signs are HD stable, afebrile    Ddx: esophagitis, esophageal stricture, food bolus impaction though patient is tolerating oral secretions and no longer vomiting, other acute intraabdominal pathology     Plan: symptomatic treatment provided   CBC & CMP consistent with patient's baseline  CT showing esophagitis    Disposition: discussed with SLIM. Admit inpatient. Recommend GI consultation. Patient understands and is agreeable to plan.          Amount and/or Complexity of Data Reviewed  Labs: ordered.  Radiology: ordered.    Risk  OTC drugs.  Prescription drug management.  Decision regarding hospitalization.             Medications   aluminum-magnesium hydroxide-simethicone (MAALOX) oral suspension 30 mL (30 mL Oral Not Given 5/17/25 2050)   sucralfate (CARAFATE) tablet 1 g (1 g Oral Not Given 5/17/25 2050)   ondansetron (ZOFRAN) injection 4 mg (4 mg Intravenous Given 5/17/25 1905)   Famotidine (PF) (PEPCID) injection 20 mg (20 mg Intravenous Given 5/17/25 2042)   iohexol (OMNIPAQUE) 350 MG/ML injection (MULTI-DOSE) 100 mL (100 mL Intravenous Given 5/17/25 2103)   sodium  chloride 0.9 % bolus 1,000 mL (1,000 mL Intravenous New Bag 5/17/25 2201)       ED Risk Strat Scores                    No data recorded        SBIRT 22yo+      Flowsheet Row Most Recent Value   Initial Alcohol Screen: US AUDIT-C     1. How often do you have a drink containing alcohol? 0 Filed at: 05/17/2025 1914   2. How many drinks containing alcohol do you have on a typical day you are drinking?  0 Filed at: 05/17/2025 1914   3a. Male UNDER 65: How often do you have five or more drinks on one occasion? 0 Filed at: 05/17/2025 1914   Audit-C Score 0 Filed at: 05/17/2025 1914   GARCÍA: How many times in the past year have you...    Used an illegal drug or used a prescription medication for non-medical reasons? Never Filed at: 05/17/2025 1914                            History of Present Illness       Chief Complaint   Patient presents with    Vomiting     Patient reports intermittent vomiting over past couple weeks, unable to keep anything down over past few hours. Reports upper mid abdominal pain.       Past Medical History:   Diagnosis Date    Allergic 1968    Seasonal    Arthritis 2012    Brain concussion Multiple    Cervical disc disease     Chronic kidney disease 2012    Chronic pain disorder     CTS (carpal tunnel syndrome) 2002    Diabetes mellitus (HCC)     Full dentures     only using upper    Head injury 1975    Headache(784.0) 1980    High blood sugar     Hypertension     Kidney stone     Liver disease     Low back pain     Cannot remember    Lumbar disc disease     Lumbosacral disc disease     Cannot remember    Migraines     Neuropathy in diabetes (HCC)     RLS (restless legs syndrome)     Skin cancer     in past    Thoracic disc disorder     Cannot remember    Visual impairment 2017    Cataracts. Surgery to correct in 2021      Past Surgical History:   Procedure Laterality Date    AMPUTATION  2022    Little toe left    CARPAL TUNNEL RELEASE  2002    CATARACT EXTRACTION Bilateral     COLONOSCOPY       CONTRACTURE Right 01/20/2025    Procedure: Right wrist and hand extensor tenolysis and wrist capsulotomy;  Surgeon: Carroll Mccarthy MD;  [facility];  Service: Orthopedics    EYE SURGERY  2021    FOOT SURGERY  2022    Left Foot    FRACTURE SURGERY  1968    Right Tib/Fib    GANGLION CYST EXCISION Left 03/25/2024    Procedure: EXCISION MUCOID CYST, INDEX FINGER DIP;  Surgeon: Carroll Mccarthy MD;  [facility];  Service: Orthopedics    GANGLION CYST EXCISION Right 05/20/2024    Procedure: EXCISION MUCOID CYST - RIGHT INDEX AND MIDDLE FINGERS;  Surgeon: Carroll Mccarthy MD;  [facility];  Service: Orthopedics    HERNIA REPAIR      INCISION AND DRAINAGE  01/16/2024    IR NEPHROURETERAL ACCESS FOR UROLOGY PCNL  04/07/2025    KIDNEY SURGERY Right 06/2012    KNEE SURGERY Right 1980    MOUTH SURGERY      NH AMPUTATION METATARSAL W/TOE SINGLE Left 6/1/2022    Procedure: RAY RESECTION FOOT;  Surgeon: Hannah Melgoza DPM;  [facility];  Service: Podiatry    NH INCISION & DRAINAGE ABSCESS COMPLICATED/MULTIPLE Right 09/17/2024    Procedure: Irrigation and debridement right wrist and hand, any indicated procedures;  Surgeon: Carroll Mccarthy MD;  [facility];  Service: Orthopedics    NH INCISION EXTENSOR TENDON SHEATH WRIST Right 01/20/2025    Procedure: RELEASE DEQUERVAINS - Right;  Surgeon: Carroll Mccarthy MD;  [facility];  Service: Orthopedics    NH PERQ NL/PL LITHOTRP COMPLEX >2 CM MLT LOCATIONS Right 04/11/2025    Procedure: NEPHROLITHOTOMY  PERCUTANEOUS (PCNL);  Surgeon: Adis Hamilton MD;  [facility];  Service: Urology    NH RPR AA HERNIA 1ST < 3 CM REDUCIBLE N/A 7/14/2023    Procedure: REPAIR HERNIA INCISIONAL;  Surgeon: Matt Melo MD;  [facility];  Service: General    NH SYNOVECTOMY EXTENSOR TENDON SHTH WRIST 1 CMPRT Right 10/03/2024    Procedure: Irrigation and debridement, right wrist, volar and  dorsal, possible extensor and flexor tenosynovectomy, any indicated procedures;  Surgeon: Carroll Mccarthy MD;  [facility];  Service: Orthopedics    VT SYNOVECTOMY EXTENSOR TENDON SHTH WRIST 1 CMPRT Right 03/24/2025    Procedure: SYNOVECTOMY / DEBRIDEMENT - FLEXOR CARPI RADIALIS AND FLEXOR CARPI ULNARIS TENDONS- RIGHT;  Surgeon: Carroll Mccarthy MD;  [facility];  Service: Orthopedics    TONSILLECTOMY  1968    Yes    WOUND DEBRIDEMENT Left 4/28/2022    Procedure: Left foot washout;  Surgeon: Hannah Melgoza DPM;  [facility];  Service: Podiatry    WOUND DEBRIDEMENT Left 6/6/2022    Procedure: DEBRIDEMENT WOUND (WASH OUT);  Surgeon: Hannah Melgoza DPM;  [facility];  Service: Podiatry      Family History   Problem Relation Age of Onset    Diabetes Paternal Grandmother     Diabetes Paternal Grandfather     Arthritis Maternal Grandmother       Social History[1]   E-Cigarette/Vaping    E-Cigarette Use Never User       E-Cigarette/Vaping Substances    Nicotine No     THC No     CBD No     Flavoring No     Other No     Unknown No       I have reviewed and agree with the history as documented.     Patient is a 60-year-old male who presents for evaluation of abdominal pain, nausea, vomiting.  Patient states that for several weeks, he has had intermittent episodes of dysphagia vomiting particularly after eating.  Today, he states that he ate lunch and felt that he was not able to completely swallow his food.  Felt like it got stuck in the middle of his chest and then subsequently vomited.  He has been afraid to attempt any other p.o. intake today, including fluids.  Denies chest pain at baseline, shortness of breath, dysuria, hematuria, fever, or chills.              Review of Systems   HENT:  Positive for trouble swallowing.    Gastrointestinal:  Positive for nausea and vomiting.   All other systems reviewed and are negative.          Objective       ED Triage Vitals    Temperature Pulse Blood Pressure Respirations SpO2 Patient Position - Orthostatic VS   05/17/25 1825 05/17/25 1825 05/17/25 1825 05/17/25 1825 05/17/25 1825 05/17/25 1825   98.3 °F (36.8 °C) 78 (!) 182/104 18 95 % Sitting      Temp Source Heart Rate Source BP Location FiO2 (%) Pain Score    05/17/25 1825 05/17/25 1825 05/17/25 1825 -- 05/17/25 1826    Oral Monitor Right arm  7      Vitals      Date and Time Temp Pulse SpO2 Resp BP Pain Score FACES Pain Rating User   05/18/25 1530 98 °F (36.7 °C) 76 93 % 18 170/94 -- -- DII   05/18/25 0843 -- 68 94 % -- 127/79 -- -- DII   05/18/25 0724 -- -- -- -- -- 6 -- NR   05/18/25 0650 97.6 °F (36.4 °C) 65 93 % 14 136/81 -- -- DII   05/18/25 0100 -- -- -- -- -- 10 - Worst Possible Pain -- CC   05/18/25 0057 -- -- -- -- 160/100 10 - Worst Possible Pain -- CC   05/17/25 2252 -- 78 93 % -- 170/101 -- -- DII   05/17/25 2240 98.1 °F (36.7 °C) 68 96 % -- 175/98 -- -- DII   05/17/25 2239 98.1 °F (36.7 °C) 74 96 % 16 182/100 -- -- DII   05/17/25 2049 -- 80 96 % 18 140/81 -- -- MA   05/17/25 1826 -- -- -- -- -- 7 -- SG   05/17/25 1825 98.3 °F (36.8 °C) 78 95 % 18 182/104 -- -- SG            Physical Exam  Constitutional:       General: He is not in acute distress.     Appearance: Normal appearance. He is obese. He is not ill-appearing, toxic-appearing or diaphoretic.   HENT:      Head: Normocephalic and atraumatic.      Nose: Nose normal.      Mouth/Throat:      Mouth: Mucous membranes are moist.      Pharynx: Oropharynx is clear.     Eyes:      Extraocular Movements: Extraocular movements intact.      Conjunctiva/sclera: Conjunctivae normal.       Cardiovascular:      Rate and Rhythm: Normal rate and regular rhythm.      Heart sounds: Normal heart sounds.   Pulmonary:      Effort: Pulmonary effort is normal.      Breath sounds: Normal breath sounds.   Abdominal:      General: Abdomen is flat. There is no distension.      Palpations: Abdomen is soft.      Tenderness: There is no  abdominal tenderness. There is no guarding or rebound.     Musculoskeletal:         General: Normal range of motion.      Cervical back: Normal range of motion and neck supple.     Skin:     General: Skin is warm and dry.      Coloration: Skin is not jaundiced.     Neurological:      Mental Status: He is alert and oriented to person, place, and time.     Psychiatric:         Mood and Affect: Mood normal.         Behavior: Behavior normal.         Results Reviewed       Procedure Component Value Units Date/Time    Comprehensive metabolic panel [[id number]]  (Abnormal) Collected: 05/18/25 0512    Lab Status: Final result Specimen: Blood from Arm, Right Updated: 05/18/25 0551     Sodium 131 mmol/L      Potassium 4.6 mmol/L      Chloride 101 mmol/L      CO2 24 mmol/L      ANION GAP 6 mmol/L      BUN 26 mg/dL      Creatinine 1.31 mg/dL      Glucose 179 mg/dL      Calcium 8.6 mg/dL      AST 12 U/L      ALT 13 U/L      Alkaline Phosphatase 43 U/L      Total Protein 6.8 g/dL      Albumin 3.8 g/dL      Total Bilirubin 0.39 mg/dL      eGFR 57 ml/min/1.73sq m     Narrative:      National Kidney Disease Foundation guidelines for Chronic Kidney Disease (CKD):     Stage 1 with normal or high GFR (GFR > 90 mL/min/1.73 square meters)    Stage 2 Mild CKD (GFR = 60-89 mL/min/1.73 square meters)    Stage 3A Moderate CKD (GFR = 45-59 mL/min/1.73 square meters)    Stage 3B Moderate CKD (GFR = 30-44 mL/min/1.73 square meters)    Stage 4 Severe CKD (GFR = 15-29 mL/min/1.73 square meters)    Stage 5 End Stage CKD (GFR <15 mL/min/1.73 square meters)  Note: GFR calculation is accurate only with a steady state creatinine    Magnesium [[id number]]  (Normal) Collected: 05/18/25 0512    Lab Status: Final result Specimen: Blood from Arm, Right Updated: 05/18/25 0551     Magnesium 2.2 mg/dL     CBC (With Platelets) [[id number]]  (Normal) Collected: 05/18/25 0512    Lab Status: Final result Specimen: Blood from Arm, Right Updated: 05/18/25 0527      WBC 4.73 Thousand/uL      RBC 4.21 Million/uL      Hemoglobin 12.9 g/dL      Hematocrit 39.3 %      MCV 93 fL      MCH 30.6 pg      MCHC 32.8 g/dL      RDW 12.7 %      Platelets 212 Thousands/uL      MPV 10.1 fL     Comprehensive metabolic panel [[id number]]  (Abnormal) Collected: 05/17/25 1906    Lab Status: Final result Specimen: Blood from Arm, Left Updated: 05/17/25 1933     Sodium 129 mmol/L      Potassium 4.9 mmol/L      Chloride 96 mmol/L      CO2 26 mmol/L      ANION GAP 7 mmol/L      BUN 29 mg/dL      Creatinine 1.38 mg/dL      Glucose 236 mg/dL      Calcium 9.0 mg/dL      AST 15 U/L      ALT 16 U/L      Alkaline Phosphatase 61 U/L      Total Protein 7.7 g/dL      Albumin 4.3 g/dL      Total Bilirubin 0.36 mg/dL      eGFR 54 ml/min/1.73sq m     Narrative:      National Kidney Disease Foundation guidelines for Chronic Kidney Disease (CKD):     Stage 1 with normal or high GFR (GFR > 90 mL/min/1.73 square meters)    Stage 2 Mild CKD (GFR = 60-89 mL/min/1.73 square meters)    Stage 3A Moderate CKD (GFR = 45-59 mL/min/1.73 square meters)    Stage 3B Moderate CKD (GFR = 30-44 mL/min/1.73 square meters)    Stage 4 Severe CKD (GFR = 15-29 mL/min/1.73 square meters)    Stage 5 End Stage CKD (GFR <15 mL/min/1.73 square meters)  Note: GFR calculation is accurate only with a steady state creatinine    CBC and differential [[id number]] Collected: 05/17/25 1906    Lab Status: Final result Specimen: Blood from Arm, Left Updated: 05/17/25 1917     WBC 4.47 Thousand/uL      RBC 4.57 Million/uL      Hemoglobin 13.7 g/dL      Hematocrit 41.2 %      MCV 90 fL      MCH 30.0 pg      MCHC 33.3 g/dL      RDW 12.6 %      MPV 9.9 fL      Platelets 233 Thousands/uL      nRBC 0 /100 WBCs      Segmented % 65 %      Immature Grans % 0 %      Lymphocytes % 20 %      Monocytes % 11 %      Eosinophils Relative 3 %      Basophils Relative 1 %      Absolute Neutrophils 2.92 Thousands/µL      Absolute Immature Grans 0.01 Thousand/uL       Absolute Lymphocytes 0.88 Thousands/µL      Absolute Monocytes 0.48 Thousand/µL      Eosinophils Absolute 0.15 Thousand/µL      Basophils Absolute 0.03 Thousands/µL             CT chest abdomen pelvis w contrast   Final Interpretation by Winnie Griffin MD ([id number])      Mild wall thickening and hyperenhancement of the distal thoracic esophagus compatible with esophagitis. Ingested material retained within the mid thoracic esophagus. Gastroenterology consult recommended               Workstation performed: [id number]             Procedures    ED Medication and Procedure Management   Prior to Admission Medications   Prescriptions Last Dose Informant Patient Reported? Taking?   Accu-Chek FastClix Lancets MISC Not Taking Self No No   Sig: Use to test blood sugar once a day   Patient not taking: Reported on 2025   Blood Glucose Monitoring Suppl (Accu-Chek Guide Me) w/Device KIT Not Taking Self No No   Sig: Use to check blood sugar once a day   Patient not taking: Reported on 2025   Empagliflozin (Jardiance) 25 MG TABS 2025  No Yes   Sig: Take 1 tablet (25 mg total) by mouth daily   Insulin Pen Needle (BD Pen Needle [place] U/F) 32G X 4 MM MISC Not Taking  No No   Sig: Use in the morning   Patient not taking: Reported on 2025   Ubrogepant (UBRELVY) 100 MG tablet Past Month Self No Yes   Sig: Take 1 tablet (100 mg) one time as needed for migraine. May repeat one additional tablet (100 mg) at least two hours after the first dose. Do not use more than two doses per day, or for more than twelve days per month.   acetaminophen (TYLENOL) 500 mg tablet 2025  No Yes   Sig: Take 2 tablets (1,000 mg total) by mouth every 8 (eight) hours   amitriptyline (ELAVIL) 10 mg tablet 2025  No Yes   Simg at bedtime   ammonium lactate (LAC-HYDRIN) 12 % lotion Past Month Self Yes Yes   Sig: as needed in the morning and as needed in the evening.   carvedilol (COREG) 25 mg tablet 2025 Morning  No Yes   Sig:  Take 1 tablet (25 mg total) by mouth 2 (two) times a day with meals   celecoxib (CeleBREX) 200 mg capsule   No No   Sig: Take 1 capsule (200 mg total) by mouth 2 (two) times a day for 5 days   glucose blood (Accu-Chek Guide) test strip Not Taking Self No No   Sig: Use to check blood sugar once a day   Patient not taking: Reported on 5/6/2025   ketoconazole (NIZORAL) 2 % cream  Self Yes No   Sig: if needed   lidocaine (Lidoderm) 5 % Past Month  No Yes   Sig: Apply 1 patch topically over 12 hours daily Remove & Discard patch within 12 hours or as directed by MD   lisinopril-hydrochlorothiazide (PRINZIDE,ZESTORETIC) 20-12.5 MG per tablet 5/17/2025 Morning  No Yes   Sig: Take 1 tablet by mouth 2 (two) times a day   methocarbamol (ROBAXIN) 750 mg tablet 5/16/2025  No Yes   Sig: Take 2 tablets (1,500 mg total) by mouth every 8 (eight) hours   traMADol (Ultram) 50 mg tablet 5/16/2025  No Yes   Sig: Take 2 tablets (100 mg total) by mouth daily at bedtime as needed for severe pain      Facility-Administered Medications: None     Current Discharge Medication List        CONTINUE these medications which have NOT CHANGED    Details   acetaminophen (TYLENOL) 500 mg tablet Take 2 tablets (1,000 mg total) by mouth every 8 (eight) hours  Qty: 60 tablet, Refills: 0    Associated Diagnoses: Right wrist tendonitis; Stiffness of finger joint of right hand; Wrist stiffness, right; Tenosynovitis of finger and hand; Aftercare following surgery of the musculoskeletal system      amitriptyline (ELAVIL) 10 mg tablet 30mg at bedtime  Qty: 90 tablet, Refills: 3    Associated Diagnoses: Chronic migraine without aura without status migrainosus, not intractable      ammonium lactate (LAC-HYDRIN) 12 % lotion as needed in the morning and as needed in the evening.      carvedilol (COREG) 25 mg tablet Take 1 tablet (25 mg total) by mouth 2 (two) times a day with meals  Qty: 200 tablet, Refills: 1    Associated Diagnoses: Essential hypertension       Empagliflozin (Jardiance) 25 MG TABS Take 1 tablet (25 mg total) by mouth daily  Qty: 90 tablet, Refills: 1    Associated Diagnoses: Type 2 diabetes mellitus with other specified complication, without long-term current use of insulin ([facility])      lidocaine (Lidoderm) 5 % Apply 1 patch topically over 12 hours daily Remove & Discard patch within 12 hours or as directed by MD  Qty: 30 patch, Refills: 0    Associated Diagnoses: Low back pain with sciatica, sciatica laterality unspecified, unspecified back pain laterality, unspecified chronicity      lisinopril-hydrochlorothiazide (PRINZIDE,ZESTORETIC) 20-12.5 MG per tablet Take 1 tablet by mouth 2 (two) times a day  Qty: 200 tablet, Refills: 1    Associated Diagnoses: Essential hypertension      methocarbamol (ROBAXIN) 750 mg tablet Take 2 tablets (1,500 mg total) by mouth every 8 (eight) hours  Qty: 60 tablet, Refills: 2    Associated Diagnoses: DDD (degenerative disc disease), lumbar      traMADol (Ultram) 50 mg tablet Take 2 tablets (100 mg total) by mouth daily at bedtime as needed for severe pain  Qty: 60 tablet, Refills: 0    Associated Diagnoses: Left leg pain      Ubrogepant (UBRELVY) 100 MG tablet Take 1 tablet (100 mg) one time as needed for migraine. May repeat one additional tablet (100 mg) at least two hours after the first dose. Do not use more than two doses per day, or for more than twelve days per month.  Qty: 12 tablet, Refills: 3    Associated Diagnoses: Chronic migraine without aura without status migrainosus, not intractable      Accu-Chek FastClix Lancets MISC Use to test blood sugar once a day  Qty: 102 each, Refills: 0    Associated Diagnoses: Type 2 diabetes mellitus with other specified complication, without long-term current use of insulin ([facility])      Blood Glucose Monitoring Suppl (Accu-Chek Guide Me) w/Device KIT Use to check blood sugar once a day  Qty: 1 kit, Refills: 0    Associated Diagnoses: Type 2 diabetes mellitus with other specified  complication, without long-term current use of insulin (HCC)      celecoxib (CeleBREX) 200 mg capsule Take 1 capsule (200 mg total) by mouth 2 (two) times a day for 5 days  Qty: 10 capsule, Refills: 0    Associated Diagnoses: Right renal stone      glucose blood (Accu-Chek Guide) test strip Use to check blood sugar once a day  Qty: 100 strip, Refills: 0    Associated Diagnoses: Type 2 diabetes mellitus with other specified complication, without long-term current use of insulin ([facility])      Insulin Pen Needle (BD Pen Needle [place] U/F) 32G X 4 MM MISC Use in the morning  Qty: 100 each, Refills: 1    Associated Diagnoses: Type 2 diabetes mellitus with chronic kidney disease, without long-term current use of insulin, unspecified CKD stage ([facility])      ketoconazole (NIZORAL) 2 % cream if needed           No discharge procedures on file.  ED SEPSIS DOCUMENTATION   Time reflects when diagnosis was documented in both MDM as applicable and the Disposition within this note       Time User Action Codes Description Comment    5/17/2025 10:03 PM Breanne Cardona Add [K20.90] Esophagitis     5/17/2025 10:37 PM Bandar Escobedo Add [R13.10] Odynophagia     5/17/2025 11:09 PM Afia Calvo Add [Z13.9] Encounter for screening involving social determinants of health (SDoH)                      [1]   Social History  Tobacco Use    Smoking status: Never    Smokeless tobacco: Never   Vaping Use    Vaping status: Never Used   Substance Use Topics    Alcohol use: Yes     Alcohol/week: 1.0 standard drink of alcohol     Types: 1 Cans of beer per week     Comment: ocassional    Drug use: Never        Breanne Cardona MD  05/18/25 1934

## 2025-05-18 NOTE — CONSULTS
Consultation - Gastroenterology   Name: Yoni Castillo 60 y.o. male I MRN: [id number]  Unit/Bed#: E5 -01 I Date of Admission: 5/17/2025   Date of Service: 5/17/2025 I Hospital Day: 0       Inpatient consult to gastroenterology     Date/Time  5/17/2025 11:01 PM     Performed by  Hitesh Madrigal MD   Authorized by  Bandar Escobedo MD           Physician Requesting Evaluation: Bandar Escobedo, *   Reason for Evaluation / Principal Problem: dysphagia, odyonohagia    Assessment & Plan  Other dysphagia   - CTAP showing esophagitis and retained intraluminal contents, which could certainly explain his dysphagia symptoms, although this may be due to stasis esophagitis. He does have intermittent symptoms and perhaps some association with allergies, so DDx also includes motility issue, mechanical obstructive etiology such as rings/strictures or EoE. He is currently fine and tolerating liquids and secretions w/o issue, so likely his esophageal contents have passed   - agree with IV PPI bid   - check esophagram today.   - SLP eval   - EGD Monday. NPO amn. We will plan for EoE Bx if feasible      History of Present Illness   HPI:  Yoni Castillo is a 60 y.o. male w/ a PMH of HTN, HLD, DM2, CKD who presents with acute on chronic dysphagia.    Chronic issue with dysphagia. Feels like it gets stuck in his neck. He notes that neck pain affects his swallowing. He reports these symptoms have been especially bad when his allergies flare in May to June. He has intermittent symptoms and no consistent trigger foods. Sometimes even liquids will feel clumsy going down. He mainly gets symptoms when he eats and then drinks water to flush it down. Yesterday he ate and felt food get stuck in his lower chest. After drinking water he spent quite a while with vomiting and spitting up previously ingested food, prompting him to come in.    No prior EGD    Drinks about 1 beer a week. No issues with alcohol previously.    Review of  Systems   Constitutional:  Negative for appetite change, chills, fatigue and fever.   HENT:  Positive for trouble swallowing.    Eyes:  Negative for photophobia.   Respiratory:  Negative for cough and shortness of breath.    Cardiovascular:  Negative for chest pain.   Gastrointestinal:  Negative for abdominal pain, constipation, diarrhea, nausea and vomiting.   Endocrine: Negative.    Genitourinary:  Negative for dysuria and frequency.   Musculoskeletal:  Negative for myalgias.   Skin:  Negative for pallor.   Neurological:  Negative for weakness.   Hematological: Negative.    Psychiatric/Behavioral: Negative.       Medical History Review: I have reviewed the patient's PMH, PSH, Social History, Family History, Meds, and Allergies     Objective :  Temp:  [98.1 °F (36.7 °C)-98.3 °F (36.8 °C)] 98.1 °F (36.7 °C)  HR:  [68-80] 78  BP: (140-182)/() 170/101  Resp:  [16-18] 16  SpO2:  [93 %-96 %] 93 %  O2 Device: None (Room air)    Physical Exam  Constitutional:       General: He is not in acute distress.     Appearance: Normal appearance.   HENT:      Head: Normocephalic and atraumatic.      Nose: Nose normal.      Mouth/Throat:      Mouth: Mucous membranes are moist.     Eyes:      General: No scleral icterus.     Extraocular Movements: Extraocular movements intact.      Conjunctiva/sclera: Conjunctivae normal.      Pupils: Pupils are equal, round, and reactive to light.       Cardiovascular:      Rate and Rhythm: Normal rate and regular rhythm.   Pulmonary:      Effort: Pulmonary effort is normal.   Abdominal:      General: Abdomen is flat. There is no distension.      Palpations: There is no mass.      Tenderness: There is no abdominal tenderness.     Musculoskeletal:         General: No swelling. Normal range of motion.     Skin:     General: Skin is warm and dry.      Capillary Refill: Capillary refill takes less than 2 seconds.     Neurological:      General: No focal deficit present.      Mental Status: He is  alert.     Psychiatric:         Mood and Affect: Mood normal.       Result Review: I have reviewed all relevant labs, imaging and procedure notes/images.

## 2025-05-18 NOTE — PLAN OF CARE
Problem: Potential for Falls  Goal: Patient will remain free of falls  Description: INTERVENTIONS:  - Educate patient/family on patient safety including physical limitations  - Instruct patient to call for assistance with activity   - Consider consulting OT/PT to assist with strengthening/mobility based on AM PAC & JH-HLM score  - Consult OT/PT to assist with strengthening/mobility   - Keep Call bell within reach  - Keep bed low and locked with side rails adjusted as appropriate  - Keep care items and personal belongings within reach  - Initiate and maintain comfort rounds  - Make Fall Risk Sign visible to staff  - Offer Toileting every 2 Hours, in advance of need  - Initiate/Maintain bed alarm  - Obtain necessary fall risk management equipment:   - Apply yellow socks and bracelet for high fall risk patients  - Consider moving patient to room near nurses station  Outcome: Progressing     Problem: PAIN - ADULT  Goal: Verbalizes/displays adequate comfort level or baseline comfort level  Description: Interventions:  - Encourage patient to monitor pain and request assistance  - Assess pain using appropriate pain scale  - Administer analgesics as ordered based on type and severity of pain and evaluate response  - Implement non-pharmacological measures as appropriate and evaluate response  - Consider cultural and social influences on pain and pain management  - Notify physician/advanced practitioner if interventions unsuccessful or patient reports new pain  - Educate patient/family on pain management process including their role and importance of  reporting pain   - Provide non-pharmacologic/complimentary pain relief interventions  Outcome: Progressing     Problem: INFECTION - ADULT  Goal: Absence or prevention of progression during hospitalization  Description: INTERVENTIONS:  - Assess and monitor for signs and symptoms of infection  - Monitor lab/diagnostic results  - Monitor all insertion sites, i.e. indwelling  lines, tubes, and drains  - Monitor endotracheal if appropriate and nasal secretions for changes in amount and color  - [place] appropriate cooling/warming therapies per order  - Administer medications as ordered  - Instruct and encourage patient and family to use good hand hygiene technique  - Identify and instruct in appropriate isolation precautions for identified infection/condition  Outcome: Progressing  Goal: Absence of fever/infection during neutropenic period  Description: INTERVENTIONS:  - Monitor WBC  - Perform strict hand hygiene  - Limit to healthy visitors only  - No plants, dried, fresh or silk flowers with melara in patient room  Outcome: Progressing     Problem: SAFETY ADULT  Goal: Patient will remain free of falls  Description: INTERVENTIONS:  - Educate patient/family on patient safety including physical limitations  - Instruct patient to call for assistance with activity   - Consider consulting OT/PT to assist with strengthening/mobility based on AM PAC & -HLM score  - Consult OT/PT to assist with strengthening/mobility   - Keep Call bell within reach  - Keep bed low and locked with side rails adjusted as appropriate  - Keep care items and personal belongings within reach  - Initiate and maintain comfort rounds  - Make Fall Risk Sign visible to staff  - Offer Toileting every 2 Hours, in advance of need  - Initiate/Maintain bed alarm  - Obtain necessary fall risk management equipment:   - Apply yellow socks and bracelet for high fall risk patients  - Consider moving patient to room near nurses station  Outcome: Progressing  Goal: Maintain or return to baseline ADL function  Description: INTERVENTIONS:  -  Assess patient's ability to carry out ADLs; assess patient's baseline for ADL function and identify physical deficits which impact ability to perform ADLs (bathing, care of mouth/teeth, toileting, grooming, dressing, etc.)  - Assess/evaluate cause of self-care deficits   - Assess range of  motion  - Assess patient's mobility; develop plan if impaired  - Assess patient's need for assistive devices and provide as appropriate  - Encourage maximum independence but intervene and supervise when necessary  - Involve family in performance of ADLs  - Assess for home care needs following discharge   - Consider OT consult to assist with ADL evaluation and planning for discharge  - Provide patient education as appropriate  - Monitor functional capacity and physical performance, use of AM PAC & JH-HLM   - Monitor gait, balance and fatigue with ambulation    Outcome: Progressing  Goal: Maintains/Returns to pre admission functional level  Description: INTERVENTIONS:  - Perform AM-PAC 6 Click Basic Mobility/ Daily Activity assessment daily.  - Set and communicate daily mobility goal to care team and patient/family/caregiver.   - Collaborate with rehabilitation services on mobility goals if consulted  - Perform Range of Motion 2 times a day.  - Reposition patient every 2 hours.  - Dangle patient 2 times a day  - Stand patient 2 times a day  - Ambulate patient 3 times a day  - Out of bed to chair 3 times a day   - Out of bed for meals 3 times a day  - Out of bed for toileting  - Record patient progress and toleration of activity level   Outcome: Progressing     Problem: DISCHARGE PLANNING  Goal: Discharge to home or other facility with appropriate resources  Description: INTERVENTIONS:  - Identify barriers to discharge w/patient and caregiver  - Arrange for needed discharge resources and transportation as appropriate  - Identify discharge learning needs (meds, wound care, etc.)  - Arrange for interpretive services to assist at discharge as needed  - Refer to Case Management Department for coordinating discharge planning if the patient needs post-hospital services based on physician/advanced practitioner order or complex needs related to functional status, cognitive ability, or social support system  Outcome:  Progressing     Problem: Knowledge Deficit  Goal: Patient/family/caregiver demonstrates understanding of disease process, treatment plan, medications, and discharge instructions  Description: Complete learning assessment and assess knowledge base.  Interventions:  - Provide teaching at level of understanding  - Provide teaching via preferred learning methods  Outcome: Progressing     Problem: Nutrition/Hydration-ADULT  Goal: Nutrient/Hydration intake appropriate for improving, restoring or maintaining nutritional needs  Description: Monitor and assess patient's nutrition/hydration status for malnutrition. Collaborate with interdisciplinary team and initiate plan and interventions as ordered.  Monitor patient's weight and dietary intake as ordered or per policy. Utilize nutrition screening tool and intervene as necessary. Determine patient's food preferences and provide high-protein, high-caloric foods as appropriate.     INTERVENTIONS:  - Monitor oral intake, urinary output, labs, and treatment plans  - Assess nutrition and hydration status and recommend course of action  - Evaluate amount of meals eaten  - Assist patient with eating if necessary   - Allow adequate time for meals  - Recommend/ encourage appropriate diets, oral nutritional supplements, and vitamin/mineral supplements  - Order, calculate, and assess calorie counts as needed  - Recommend, monitor, and adjust tube feedings and TPN/PPN based on assessed needs  - Assess need for intravenous fluids  - Provide specific nutrition/hydration education as appropriate  - Include patient/family/caregiver in decisions related to nutrition  Outcome: Progressing

## 2025-05-18 NOTE — PROGRESS NOTES
Progress Note - Hospitalist   Name: Yoni Castillo 60 y.o. male I MRN: [id number]  Unit/Bed#: E5 -01 I Date of Admission: 5/17/2025   Date of Service: 5/18/2025 I Hospital Day: 1    Assessment & Plan  Other dysphagia  Patient presented to the ER with complaint of difficulty swallowing  Appears to be an acute on chronic problem  CT chest did show mild wall thickening and hyperenhancement of distal thoracic esophagus compatible with esophagitis  Gastroenterology following  Plan for esophagram  Speech consults  N.p.o. pending GI evaluation  IV fluid hydration    Hyponatremia  Recent Labs     05/17/25  1906 05/18/25  0512   SODIUM 129* 131*       Does have mild hyponatremia with sodium 129 at time of admission, improved to 131 with IV fluids  Likely secondary to hypovolemia in the setting of poor oral intake  Continue IV fluids pending improved oral intake    Essential hypertension  BP elevated at time of admission, improved this morning  Maintained outpatient on Coreg, lisinopril and HCTZ  Hold HCTZ given hyponatremia    Type 2 diabetes mellitus with chronic kidney disease, without long-term current use of insulin ([facility])  Lab Results   Component Value Date    HGBA1C 10.4 (H) 04/06/2025       Recent Labs     05/18/25  0104 05/18/25  0534 05/18/25  0650   POCGLU 187* 175* 164*     Continue sliding scale insulin coverage with Accu-Cheks    Class 2 severe obesity with serious comorbidity in adult (HCC)  Affects all aspects of care  Chronic kidney disease, stage 3 ([facility])  Lab Results   Component Value Date    EGFR 57 05/18/2025    EGFR 54 05/17/2025    EGFR 64 04/12/2025    CREATININE 1.31 (H) 05/18/2025    CREATININE 1.38 (H) 05/17/2025    CREATININE 1.20 04/12/2025   Currently at baseline continue to monitor    JEWEL (obstructive sleep apnea)  Not currently on CPAP.    VTE Pharmacologic Prophylaxis: VTE Score: 6 High Risk (Score >/= 5) - Pharmacological DVT Prophylaxis Ordered: heparin. Sequential Compression Devices  Ordered.    Mobility:   Basic Mobility Inpatient Raw Score: 24  JH-HLM Goal: 8: Walk 250 feet or more  JH-HLM Achieved: 7: Walk 25 feet or more  JH-HLM Goal NOT achieved. Continue with multidisciplinary rounding and encourage appropriate mobility to improve upon JH-HLM goals.    Patient Centered Rounds: I performed bedside rounds with nursing staff today.   Discussions with Specialists or Other Care Team Provider: None    Current Length of Stay: 1 day(s)  Current Patient Status: Inpatient   Certification Statement: The patient will continue to require additional inpatient hospital stay due to dysphagia pending GI evaluation and plans  Discharge Plan: Anticipate discharge in 24-48 hrs to home.    Code Status: Level 1 - Full Code    Subjective   Patient seen and examined at bedside.  Notes he is feeling about the same today.  Has been n.p.o. overnight.  Only main complaint is chronic pain in his back and neck.    Objective :  Temp:  [97.6 °F (36.4 °C)-98.3 °F (36.8 °C)] 97.6 °F (36.4 °C)  HR:  [65-80] 68  BP: (127-182)/() 127/79  Resp:  [14-18] 14  SpO2:  [93 %-96 %] 94 %  O2 Device: None (Room air)    Body mass index is 38.52 kg/m².     Input and Output Summary (last 24 hours):     Intake/Output Summary (Last 24 hours) at 5/18/2025 1027  Last data filed at 5/18/2025 0837  Gross per 24 hour   Intake 0 ml   Output --   Net 0 ml       Physical Exam  Vitals reviewed.   Constitutional:       General: He is not in acute distress.  HENT:      Head: Normocephalic and atraumatic.     Eyes:      General: No scleral icterus.     Conjunctiva/sclera: Conjunctivae normal.       Cardiovascular:      Rate and Rhythm: Normal rate and regular rhythm.      Heart sounds: No murmur heard.  Pulmonary:      Effort: Pulmonary effort is normal. No respiratory distress.      Breath sounds: Normal breath sounds.   Abdominal:      General: Bowel sounds are normal. There is no distension.      Palpations: Abdomen is soft.      Tenderness:  There is no abdominal tenderness.     Musculoskeletal:      Cervical back: Neck supple.      Right lower leg: No edema.      Left lower leg: No edema.     Skin:     General: Skin is warm and dry.     Neurological:      Mental Status: He is alert and oriented to person, place, and time.     Psychiatric:         Mood and Affect: Mood normal.         Behavior: Behavior normal.           Lines/Drains:              Lab Results: I have reviewed the following results:   Results from last 7 days   Lab Units 05/18/25  0512 05/17/25  1906   WBC Thousand/uL 4.73 4.47   HEMOGLOBIN g/dL 12.9 13.7   HEMATOCRIT % 39.3 41.2   PLATELETS Thousands/uL 212 233   SEGS PCT %  --  65   LYMPHO PCT %  --  20   MONO PCT %  --  11   EOS PCT %  --  3     Results from last 7 days   Lab Units 05/18/25  0512   SODIUM mmol/L 131*   POTASSIUM mmol/L 4.6   CHLORIDE mmol/L 101   CO2 mmol/L 24   BUN mg/dL 26*   CREATININE mg/dL 1.31*   ANION GAP mmol/L 6   CALCIUM mg/dL 8.6   ALBUMIN g/dL 3.8   TOTAL BILIRUBIN mg/dL 0.39   ALK PHOS U/L 43   ALT U/L 13   AST U/L 12*   GLUCOSE RANDOM mg/dL 179*         Results from last 7 days   Lab Units 05/18/25  0650 05/18/25  0534 05/18/25  0104   POC GLUCOSE mg/dl 164* 175* 187*               Recent Cultures (last 7 days):               Last 24 Hours Medication List:     Current Facility-Administered Medications:     acetaminophen (TYLENOL) tablet 975 mg, Q8H    aluminum-magnesium hydroxide-simethicone (MAALOX) oral suspension 30 mL, Once    amitriptyline (ELAVIL) tablet 30 mg, HS    carvedilol (COREG) tablet 25 mg, BID With Meals    heparin (porcine) subcutaneous injection 5,000 Units, Q8H WILTON    lisinopril (ZESTRIL) tablet 20 mg, BID **AND** hydroCHLOROthiazide tablet 12.5 mg, BID    insulin lispro (HumALOG/ADMELOG) 100 units/mL subcutaneous injection 1-6 Units, Q6H WILTON **AND** Fingerstick Glucose (POCT), Q6H    ondansetron (ZOFRAN) injection 4 mg, Q6H PRN    pantoprazole (PROTONIX) injection 40 mg, Q12H WILTON     sodium chloride 0.9 % infusion, Continuous, Last Rate: 100 mL/hr (05/17/25 0572)    sucralfate (CARAFATE) tablet 1 g, Once    traMADol (ULTRAM) tablet 100 mg, HS PRN    Administrative Statements   Today, Patient Was Seen By: Jailene Grace PA-C      **Please Note: This note may have been constructed using a voice recognition system.**

## 2025-05-18 NOTE — ASSESSMENT & PLAN NOTE
Lab Results   Component Value Date    HGBA1C 10.4 (H) 04/06/2025       Recent Labs     05/18/25  0104 05/18/25  0534 05/18/25  0650   POCGLU 187* 175* 164*     Continue sliding scale insulin coverage with Accu-Cheks

## 2025-05-18 NOTE — ASSESSMENT & PLAN NOTE
"Lab Results   Component Value Date    HGBA1C 10.4 (H) 04/06/2025       No results for input(s): \"POCGLU\" in the last 72 hours.    Blood Sugar Average: Last 72 hrs:  Controlled  Sliding scale every 6 hours  Continue Jardiance      "

## 2025-05-18 NOTE — ASSESSMENT & PLAN NOTE
Lab Results   Component Value Date    EGFR 57 05/18/2025    EGFR 54 05/17/2025    EGFR 64 04/12/2025    CREATININE 1.31 (H) 05/18/2025    CREATININE 1.38 (H) 05/17/2025    CREATININE 1.20 04/12/2025   Currently at baseline continue to monitor

## 2025-05-18 NOTE — PLAN OF CARE
Problem: PAIN - ADULT  Goal: Verbalizes/displays adequate comfort level or baseline comfort level  Description: Interventions:  - Encourage patient to monitor pain and request assistance  - Assess pain using appropriate pain scale  - Administer analgesics as ordered based on type and severity of pain and evaluate response  - Implement non-pharmacological measures as appropriate and evaluate response  - Consider cultural and social influences on pain and pain management  - Notify physician/advanced practitioner if interventions unsuccessful or patient reports new pain  - Educate patient/family on pain management process including their role and importance of  reporting pain   - Provide non-pharmacologic/complimentary pain relief interventions  Outcome: Progressing     Problem: INFECTION - ADULT  Goal: Absence or prevention of progression during hospitalization  Description: INTERVENTIONS:  - Assess and monitor for signs and symptoms of infection  - Monitor lab/diagnostic results  - Monitor all insertion sites, i.e. indwelling lines, tubes, and drains  - Monitor endotracheal if appropriate and nasal secretions for changes in amount and color  - [place] appropriate cooling/warming therapies per order  - Administer medications as ordered  - Instruct and encourage patient and family to use good hand hygiene technique  - Identify and instruct in appropriate isolation precautions for identified infection/condition  Outcome: Progressing  Goal: Absence of fever/infection during neutropenic period  Description: INTERVENTIONS:  - Monitor WBC  - Perform strict hand hygiene  - Limit to healthy visitors only  - No plants, dried, fresh or silk flowers with melara in patient room  Outcome: Progressing     Problem: SAFETY ADULT  Goal: Patient will remain free of falls  Description: INTERVENTIONS:  - Educate patient/family on patient safety including physical limitations  - Instruct patient to call for assistance with activity   -  Consider consulting OT/PT to assist with strengthening/mobility based on AM PAC & JH-HLM score  - Consult OT/PT to assist with strengthening/mobility   - Keep Call bell within reach  - Keep bed low and locked with side rails adjusted as appropriate  - Keep care items and personal belongings within reach  - Initiate and maintain comfort rounds  - Make Fall Risk Sign visible to staff  - Offer Toileting every 2 Hours, in advance of need  - Obtain necessary fall risk management equipment  - Apply yellow socks and bracelet for high fall risk patients  - Consider moving patient to room near nurses station  Outcome: Progressing  Goal: Maintain or return to baseline ADL function  Description: INTERVENTIONS:  -  Assess patient's ability to carry out ADLs; assess patient's baseline for ADL function and identify physical deficits which impact ability to perform ADLs (bathing, care of mouth/teeth, toileting, grooming, dressing, etc.)  - Assess/evaluate cause of self-care deficits   - Assess range of motion  - Assess patient's mobility; develop plan if impaired  - Assess patient's need for assistive devices and provide as appropriate  - Encourage maximum independence but intervene and supervise when necessary  - Involve family in performance of ADLs  - Assess for home care needs following discharge   - Consider OT consult to assist with ADL evaluation and planning for discharge  - Provide patient education as appropriate  - Monitor functional capacity and physical performance, use of AM PAC & JH-HLM   - Monitor gait, balance and fatigue with ambulation    Outcome: Progressing  Goal: Maintains/Returns to pre admission functional level  Description: INTERVENTIONS:  - Perform AM-PAC 6 Click Basic Mobility/ Daily Activity assessment daily.  - Set and communicate daily mobility goal to care team and patient/family/caregiver.   - Collaborate with rehabilitation services on mobility goals if consulted  - Perform Range of Motion 3 times  a day.  - Reposition patient every 3 hours.  - Dangle patient 3 times a day  - Stand patient 3 times a day  - Ambulate patient 3 times a day  - Out of bed to chair 3 times a day   - Out of bed for meals 3 times a day  - Out of bed for toileting  - Record patient progress and toleration of activity level   Outcome: Progressing     Problem: Knowledge Deficit  Goal: Patient/family/caregiver demonstrates understanding of disease process, treatment plan, medications, and discharge instructions  Description: Complete learning assessment and assess knowledge base.  Interventions:  - Provide teaching at level of understanding  - Provide teaching via preferred learning methods  Outcome: Progressing     Problem: Nutrition/Hydration-ADULT  Goal: Nutrient/Hydration intake appropriate for improving, restoring or maintaining nutritional needs  Description: Monitor and assess patient's nutrition/hydration status for malnutrition. Collaborate with interdisciplinary team and initiate plan and interventions as ordered.  Monitor patient's weight and dietary intake as ordered or per policy. Utilize nutrition screening tool and intervene as necessary. Determine patient's food preferences and provide high-protein, high-caloric foods as appropriate.     INTERVENTIONS:  - Monitor oral intake, urinary output, labs, and treatment plans  - Assess nutrition and hydration status and recommend course of action  - Evaluate amount of meals eaten  - Assist patient with eating if necessary   - Allow adequate time for meals  - Recommend/ encourage appropriate diets, oral nutritional supplements, and vitamin/mineral supplements  - Order, calculate, and assess calorie counts as needed  - Recommend, monitor, and adjust tube feedings and TPN/PPN based on assessed needs  - Assess need for intravenous fluids  - Provide specific nutrition/hydration education as appropriate  - Include patient/family/caregiver in decisions related to nutrition  Outcome:  Progressing

## 2025-05-18 NOTE — ASSESSMENT & PLAN NOTE
Patient is very pleasant  Claims that he has had difficulty swallowing both solids and liquids.  Recently liquids have been regurgitated after attempting to swallow  CT chest abdomen pelvis shows mild wall thickening and hyperenhancement of the distal thoracic esophagus compatible with esophagitis  Patient has drinking history and also drinks alcohol  No previous EGD as noted in the chart    Plan:  PPI twice daily  Continue IV fluids  GI consult  Patient also takes tramadol mostly at bedtime.  Will order his home dose

## 2025-05-18 NOTE — ASSESSMENT & PLAN NOTE
Patient presented to the ER with complaint of difficulty swallowing  Appears to be an acute on chronic problem  CT chest did show mild wall thickening and hyperenhancement of distal thoracic esophagus compatible with esophagitis  Gastroenterology following  Plan for esophagram  Speech consults  N.p.o. pending GI evaluation  IV fluid hydration

## 2025-05-18 NOTE — ASSESSMENT & PLAN NOTE
- CTAP showing esophagitis and retained intraluminal contents, which could certainly explain his dysphagia symptoms, although this may be due to stasis esophagitis. He does have intermittent symptoms and perhaps some association with allergies, so DDx also includes motility issue, mechanical obstructive etiology such as rings/strictures or EoE. He is currently fine and tolerating liquids and secretions w/o issue, so likely his esophageal contents have passed   - agree with IV PPI bid   - check esophagram today.   - SLP eval   - EGD Monday. NPO amn. We will plan for EoE Bx if feasible

## 2025-05-19 ENCOUNTER — ANESTHESIA EVENT (INPATIENT)
Dept: GASTROENTEROLOGY | Facility: HOSPITAL | Age: 60
DRG: 375 | End: 2025-05-19
Payer: MEDICARE

## 2025-05-19 ENCOUNTER — APPOINTMENT (INPATIENT)
Dept: GASTROENTEROLOGY | Facility: HOSPITAL | Age: 60
DRG: 375 | End: 2025-05-19
Attending: STUDENT IN AN ORGANIZED HEALTH CARE EDUCATION/TRAINING PROGRAM
Payer: MEDICARE

## 2025-05-19 ENCOUNTER — ANESTHESIA (INPATIENT)
Dept: GASTROENTEROLOGY | Facility: HOSPITAL | Age: 60
DRG: 375 | End: 2025-05-19
Payer: MEDICARE

## 2025-05-19 ENCOUNTER — APPOINTMENT (OUTPATIENT)
Dept: OCCUPATIONAL THERAPY | Age: 60
End: 2025-05-19
Payer: MEDICARE

## 2025-05-19 LAB
ANION GAP SERPL CALCULATED.3IONS-SCNC: 7 MMOL/L (ref 4–13)
BASOPHILS # BLD AUTO: 0.02 THOUSANDS/ÂΜL (ref 0–0.1)
BASOPHILS NFR BLD AUTO: 0 % (ref 0–1)
BUN SERPL-MCNC: 26 MG/DL (ref 5–25)
CALCIUM SERPL-MCNC: 8.7 MG/DL (ref 8.4–10.2)
CHLORIDE SERPL-SCNC: 99 MMOL/L (ref 96–108)
CO2 SERPL-SCNC: 24 MMOL/L (ref 21–32)
CREAT SERPL-MCNC: 1.37 MG/DL (ref 0.6–1.3)
EOSINOPHIL # BLD AUTO: 0 THOUSAND/ÂΜL (ref 0–0.61)
EOSINOPHIL NFR BLD AUTO: 0 % (ref 0–6)
ERYTHROCYTE [DISTWIDTH] IN BLOOD BY AUTOMATED COUNT: 12.5 % (ref 11.6–15.1)
GFR SERPL CREATININE-BSD FRML MDRD: 54 ML/MIN/1.73SQ M
GLUCOSE SERPL-MCNC: 212 MG/DL (ref 65–140)
GLUCOSE SERPL-MCNC: 262 MG/DL (ref 65–140)
GLUCOSE SERPL-MCNC: 277 MG/DL (ref 65–140)
GLUCOSE SERPL-MCNC: 277 MG/DL (ref 65–140)
GLUCOSE SERPL-MCNC: 282 MG/DL (ref 65–140)
GLUCOSE SERPL-MCNC: 300 MG/DL (ref 65–140)
HCT VFR BLD AUTO: 38.1 % (ref 36.5–49.3)
HGB BLD-MCNC: 12.7 G/DL (ref 12–17)
IMM GRANULOCYTES # BLD AUTO: 0.04 THOUSAND/UL (ref 0–0.2)
IMM GRANULOCYTES NFR BLD AUTO: 1 % (ref 0–2)
LYMPHOCYTES # BLD AUTO: 0.59 THOUSANDS/ÂΜL (ref 0.6–4.47)
LYMPHOCYTES NFR BLD AUTO: 11 % (ref 14–44)
MCH RBC QN AUTO: 30.2 PG (ref 26.8–34.3)
MCHC RBC AUTO-ENTMCNC: 33.3 G/DL (ref 31.4–37.4)
MCV RBC AUTO: 91 FL (ref 82–98)
MONOCYTES # BLD AUTO: 0.2 THOUSAND/ÂΜL (ref 0.17–1.22)
MONOCYTES NFR BLD AUTO: 4 % (ref 4–12)
NEUTROPHILS # BLD AUTO: 4.33 THOUSANDS/ÂΜL (ref 1.85–7.62)
NEUTS SEG NFR BLD AUTO: 84 % (ref 43–75)
NRBC BLD AUTO-RTO: 0 /100 WBCS
PLATELET # BLD AUTO: 211 THOUSANDS/UL (ref 149–390)
PMV BLD AUTO: 9.9 FL (ref 8.9–12.7)
POTASSIUM SERPL-SCNC: 4.9 MMOL/L (ref 3.5–5.3)
RBC # BLD AUTO: 4.2 MILLION/UL (ref 3.88–5.62)
SODIUM SERPL-SCNC: 130 MMOL/L (ref 135–147)
WBC # BLD AUTO: 5.18 THOUSAND/UL (ref 4.31–10.16)

## 2025-05-19 PROCEDURE — 88313 SPECIAL STAINS GROUP 2: CPT | Performed by: PATHOLOGY

## 2025-05-19 PROCEDURE — 85025 COMPLETE CBC W/AUTO DIFF WBC: CPT | Performed by: PHYSICIAN ASSISTANT

## 2025-05-19 PROCEDURE — 88342 IMHCHEM/IMCYTCHM 1ST ANTB: CPT | Performed by: PATHOLOGY

## 2025-05-19 PROCEDURE — 88360 TUMOR IMMUNOHISTOCHEM/MANUAL: CPT | Performed by: PATHOLOGY

## 2025-05-19 PROCEDURE — 80048 BASIC METABOLIC PNL TOTAL CA: CPT | Performed by: PHYSICIAN ASSISTANT

## 2025-05-19 PROCEDURE — 99232 SBSQ HOSP IP/OBS MODERATE 35: CPT | Performed by: PHYSICIAN ASSISTANT

## 2025-05-19 PROCEDURE — 0DB48ZX EXCISION OF ESOPHAGOGASTRIC JUNCTION, VIA NATURAL OR ARTIFICIAL OPENING ENDOSCOPIC, DIAGNOSTIC: ICD-10-PCS | Performed by: INTERNAL MEDICINE

## 2025-05-19 PROCEDURE — 82948 REAGENT STRIP/BLOOD GLUCOSE: CPT

## 2025-05-19 PROCEDURE — 0DB78ZX EXCISION OF STOMACH, PYLORUS, VIA NATURAL OR ARTIFICIAL OPENING ENDOSCOPIC, DIAGNOSTIC: ICD-10-PCS | Performed by: INTERNAL MEDICINE

## 2025-05-19 PROCEDURE — 88341 IMHCHEM/IMCYTCHM EA ADD ANTB: CPT | Performed by: PATHOLOGY

## 2025-05-19 PROCEDURE — 88305 TISSUE EXAM BY PATHOLOGIST: CPT | Performed by: PATHOLOGY

## 2025-05-19 RX ORDER — SODIUM CHLORIDE, SODIUM LACTATE, POTASSIUM CHLORIDE, CALCIUM CHLORIDE 600; 310; 30; 20 MG/100ML; MG/100ML; MG/100ML; MG/100ML
INJECTION, SOLUTION INTRAVENOUS CONTINUOUS PRN
Status: DISCONTINUED | OUTPATIENT
Start: 2025-05-19 | End: 2025-05-19

## 2025-05-19 RX ORDER — LIDOCAINE HYDROCHLORIDE 20 MG/ML
INJECTION, SOLUTION EPIDURAL; INFILTRATION; INTRACAUDAL; PERINEURAL AS NEEDED
Status: DISCONTINUED | OUTPATIENT
Start: 2025-05-19 | End: 2025-05-19

## 2025-05-19 RX ORDER — SUCCINYLCHOLINE/SOD CL,ISO/PF 100 MG/5ML
SYRINGE (ML) INTRAVENOUS AS NEEDED
Status: DISCONTINUED | OUTPATIENT
Start: 2025-05-19 | End: 2025-05-19

## 2025-05-19 RX ORDER — INSULIN LISPRO 100 [IU]/ML
3 INJECTION, SOLUTION INTRAVENOUS; SUBCUTANEOUS
Status: DISCONTINUED | OUTPATIENT
Start: 2025-05-19 | End: 2025-05-20 | Stop reason: HOSPADM

## 2025-05-19 RX ORDER — INSULIN GLARGINE 100 [IU]/ML
7 INJECTION, SOLUTION SUBCUTANEOUS
Status: DISCONTINUED | OUTPATIENT
Start: 2025-05-19 | End: 2025-05-20 | Stop reason: HOSPADM

## 2025-05-19 RX ORDER — ONDANSETRON 2 MG/ML
4 INJECTION INTRAMUSCULAR; INTRAVENOUS EVERY 6 HOURS PRN
Status: DISCONTINUED | OUTPATIENT
Start: 2025-05-19 | End: 2025-05-20 | Stop reason: HOSPADM

## 2025-05-19 RX ORDER — PROPOFOL 10 MG/ML
INJECTION, EMULSION INTRAVENOUS AS NEEDED
Status: DISCONTINUED | OUTPATIENT
Start: 2025-05-19 | End: 2025-05-19

## 2025-05-19 RX ADMIN — TRAMADOL HYDROCHLORIDE 100 MG: 50 TABLET, COATED ORAL at 22:14

## 2025-05-19 RX ADMIN — LIDOCAINE HYDROCHLORIDE 100 MG: 20 INJECTION, SOLUTION EPIDURAL; INFILTRATION; INTRACAUDAL at 12:09

## 2025-05-19 RX ADMIN — PANTOPRAZOLE SODIUM 40 MG: 40 INJECTION, POWDER, LYOPHILIZED, FOR SOLUTION INTRAVENOUS at 22:07

## 2025-05-19 RX ADMIN — PROPOFOL 150 MCG/KG/MIN: 10 INJECTION, EMULSION INTRAVENOUS at 12:12

## 2025-05-19 RX ADMIN — PROPOFOL 50 MG: 10 INJECTION, EMULSION INTRAVENOUS at 12:10

## 2025-05-19 RX ADMIN — Medication 40 MG: at 12:13

## 2025-05-19 RX ADMIN — PROPOFOL 50 MG: 10 INJECTION, EMULSION INTRAVENOUS at 12:11

## 2025-05-19 RX ADMIN — SODIUM CHLORIDE 100 ML/HR: 0.9 INJECTION, SOLUTION INTRAVENOUS at 06:19

## 2025-05-19 RX ADMIN — ACETAMINOPHEN 650 MG: 325 TABLET, FILM COATED ORAL at 22:09

## 2025-05-19 RX ADMIN — ACETAMINOPHEN 650 MG: 325 TABLET, FILM COATED ORAL at 14:13

## 2025-05-19 RX ADMIN — Medication 100 MG: at 12:22

## 2025-05-19 RX ADMIN — PROPOFOL 150 MG: 10 INJECTION, EMULSION INTRAVENOUS at 12:22

## 2025-05-19 RX ADMIN — PANTOPRAZOLE SODIUM 40 MG: 40 INJECTION, POWDER, LYOPHILIZED, FOR SOLUTION INTRAVENOUS at 08:52

## 2025-05-19 RX ADMIN — CARVEDILOL 25 MG: 25 TABLET, FILM COATED ORAL at 17:11

## 2025-05-19 RX ADMIN — SODIUM CHLORIDE 100 ML/HR: 0.9 INJECTION, SOLUTION INTRAVENOUS at 19:37

## 2025-05-19 RX ADMIN — CARVEDILOL 25 MG: 25 TABLET, FILM COATED ORAL at 08:52

## 2025-05-19 RX ADMIN — AMITRIPTYLINE HYDROCHLORIDE 30 MG: 10 TABLET, FILM COATED ORAL at 22:09

## 2025-05-19 RX ADMIN — PROPOFOL 50 MG: 10 INJECTION, EMULSION INTRAVENOUS at 12:33

## 2025-05-19 RX ADMIN — LISINOPRIL 20 MG: 20 TABLET ORAL at 08:52

## 2025-05-19 RX ADMIN — SODIUM CHLORIDE, SODIUM LACTATE, POTASSIUM CHLORIDE, AND CALCIUM CHLORIDE: .6; .31; .03; .02 INJECTION, SOLUTION INTRAVENOUS at 12:04

## 2025-05-19 RX ADMIN — LISINOPRIL 20 MG: 20 TABLET ORAL at 22:15

## 2025-05-19 RX ADMIN — PROPOFOL 100 MG: 10 INJECTION, EMULSION INTRAVENOUS at 12:09

## 2025-05-19 RX ADMIN — ONDANSETRON 4 MG: 2 INJECTION INTRAMUSCULAR; INTRAVENOUS at 12:31

## 2025-05-19 NOTE — ASSESSMENT & PLAN NOTE
Lab Results   Component Value Date    EGFR 54 05/19/2025    EGFR 57 05/18/2025    EGFR 54 05/17/2025    CREATININE 1.37 (H) 05/19/2025    CREATININE 1.31 (H) 05/18/2025    CREATININE 1.38 (H) 05/17/2025   Currently at baseline continue to monitor

## 2025-05-19 NOTE — ASSESSMENT & PLAN NOTE
Patient presented to the ER with complaint of difficulty swallowing  Appears to be an acute on chronic problem although denies previous workup   Reports repetitive Meding which prompted evaluation  CT chest did show mild wall thickening and hyperenhancement of distal thoracic esophagus compatible with esophagitis  Seen by gastroenterology  Esophagram ordered  Speech consults  GI planning on EGD today-current n.p.o.

## 2025-05-19 NOTE — PROGRESS NOTES
Progress Note - Hospitalist   Name: Yoni Castillo 60 y.o. male I MRN: [id number]  Unit/Bed#: E5 -01 I Date of Admission: 5/17/2025   Date of Service: 5/19/2025 I Hospital Day: 2    Assessment & Plan  Other dysphagia  Patient presented to the ER with complaint of difficulty swallowing  Appears to be an acute on chronic problem although denies previous workup   Reports repetitive Meding which prompted evaluation  CT chest did show mild wall thickening and hyperenhancement of distal thoracic esophagus compatible with esophagitis  Seen by gastroenterology  Esophagram ordered  Speech consults  GI planning on EGD today-current n.p.o.  Hyponatremia  Recent Labs     05/17/25  1906 05/18/25  0512 05/19/25  0449   SODIUM 129* 131* 130*   Does have mild hyponatremia with sodium 129 at time of admission, improved to 131 with IV fluids  Likely secondary to hypovolemia in the setting of poor oral intake  Continue IV fluids pending improved oral intake  Essential hypertension  BP elevated at time of admission, improved this morning  Maintained outpatient on Coreg, lisinopril and HCTZ  Hold HCTZ given hyponatremia  Type 2 diabetes mellitus with chronic kidney disease, without long-term current use of insulin ([facility])  Lab Results   Component Value Date    HGBA1C 10.4 (H) 04/06/2025     Recent Labs     05/18/25 2058 05/19/25  0017 05/19/25  0546 05/19/25  0622   POCGLU 339* 300* 262* 277*   Continue sliding scale insulin coverage with Accu-Cheks  Add long-acting insulin-Lantus 7 units at night for better glucose control  Add Premeal insulin 5 units TID daily with meals  Class 2 severe obesity with serious comorbidity in adult (HCC)  Affects all aspects of care  Chronic kidney disease, stage 3 ([facility])  Lab Results   Component Value Date    EGFR 54 05/19/2025    EGFR 57 05/18/2025    EGFR 54 05/17/2025    CREATININE 1.37 (H) 05/19/2025    CREATININE 1.31 (H) 05/18/2025    CREATININE 1.38 (H) 05/17/2025   Currently at baseline  continue to monitor  JEWEL (obstructive sleep apnea)  Not currently on CPAP    VTE Pharmacologic Prophylaxis: VTE Score: 6 heparin    Mobility:   Basic Mobility Inpatient Raw Score: 24  JH-HLM Goal: 8: Walk 250 feet or more  JH-HLM Achieved: 6: Walk 10 steps or more    Patient Centered Rounds: I performed bedside rounds with nursing staff today.   Discussions with Specialists or Other Care Team Provider: nursing    Education and Discussions with Family / Patient: patient    Current Length of Stay: 2 day(s)  Current Patient Status: Inpatient   Certification Statement: with need for continued inpatient stay for EGD  Discharge Plan: 24-48 hrs    Code Status: Level 1 - Full Code    Subjective   Resting in bed.  Reports having difficulty swallowing for quite some time now.  Previously has never undergone EGD.    Objective :  Temp:  [96.6 °F (35.9 °C)-98.1 °F (36.7 °C)] 97 °F (36.1 °C)  HR:  [63-79] 63  BP: (124-177)/(64-94) 175/79  Resp:  [16-21] 18  SpO2:  [93 %-97 %] 96 %  O2 Device: Simple mask    Body mass index is 38.52 kg/m².     Input and Output Summary (last 24 hours):     Intake/Output Summary (Last 24 hours) at 5/19/2025 1328  Last data filed at 5/19/2025 0619  Gross per 24 hour   Intake 3380 ml   Output 1880 ml   Net 1500 ml       Physical Exam  Vitals and nursing note reviewed.   Constitutional:       General: He is not in acute distress.     Appearance: Normal appearance. He is normal weight. He is not ill-appearing, toxic-appearing or diaphoretic.   HENT:      Head: Normocephalic and atraumatic.     Eyes:      General: No scleral icterus.      Cardiovascular:      Rate and Rhythm: Normal rate and regular rhythm.   Pulmonary:      Effort: Pulmonary effort is normal. No respiratory distress.      Breath sounds: Normal breath sounds. No stridor. No wheezing or rhonchi.   Abdominal:      General: There is no distension.      Palpations: Abdomen is soft. There is no mass.      Tenderness: There is no abdominal  tenderness.      Hernia: A hernia is present.     Musculoskeletal:         General: No swelling.      Cervical back: Neck supple.     Skin:     General: Skin is warm and dry.     Neurological:      Mental Status: He is alert and oriented to person, place, and time. Mental status is at baseline.     Psychiatric:         Mood and Affect: Mood normal.         Behavior: Behavior normal.             Lab Results: I have reviewed the following results:   Results from last 7 days   Lab Units 05/19/25  0449   WBC Thousand/uL 5.18   HEMOGLOBIN g/dL 12.7   HEMATOCRIT % 38.1   PLATELETS Thousands/uL 211   SEGS PCT % 84*   LYMPHO PCT % 11*   MONO PCT % 4   EOS PCT % 0     Results from last 7 days   Lab Units 05/19/25  0449 05/18/25  0512   SODIUM mmol/L 130* 131*   POTASSIUM mmol/L 4.9 4.6   CHLORIDE mmol/L 99 101   CO2 mmol/L 24 24   BUN mg/dL 26* 26*   CREATININE mg/dL 1.37* 1.31*   ANION GAP mmol/L 7 6   CALCIUM mg/dL 8.7 8.6   ALBUMIN g/dL  --  3.8   TOTAL BILIRUBIN mg/dL  --  0.39   ALK PHOS U/L  --  43   ALT U/L  --  13   AST U/L  --  12*   GLUCOSE RANDOM mg/dL 277* 179*         Results from last 7 days   Lab Units 05/19/25  0622 05/19/25  0546 05/19/25  0017 05/18/25  2058 05/18/25  1549 05/18/25  1105 05/18/25  0650 05/18/25  0534 05/18/25  0104   POC GLUCOSE mg/dl 277* 262* 300* 339* 246* 211* 164* 175* 187*               Recent Cultures (last 7 days):               Last 24 Hours Medication List:     Current Facility-Administered Medications:     acetaminophen (TYLENOL) tablet 650 mg, Q8H WILTON    aluminum-magnesium hydroxide-simethicone (MAALOX) oral suspension 30 mL, Once    amitriptyline (ELAVIL) tablet 30 mg, HS    carvedilol (COREG) tablet 25 mg, BID With Meals    heparin (porcine) subcutaneous injection 5,000 Units, Q8H WILTON    lisinopril (ZESTRIL) tablet 20 mg, BID **AND** [Held by provider] hydroCHLOROthiazide tablet 12.5 mg, BID    insulin lispro (HumALOG/ADMELOG) 100 units/mL subcutaneous injection 1-6 Units, Q6H  [place]IL[place] **AND** Fingerstick Glucose (POCT), Q6H    ondansetron (ZOFRAN) injection 4 mg, Q6H PRN    ondansetron (ZOFRAN) injection 4 mg, Q6H PRN    pantoprazole (PROTONIX) injection 40 mg, Q12H WILTON    sodium chloride 0.9 % infusion, Continuous, Last Rate: 100 mL/hr (05/19/25 0619)    sucralfate (CARAFATE) tablet 1 g, Once    SUMAtriptan (IMITREX) subcutaneous injection 6 mg, Q1H PRN    traMADol (ULTRAM) tablet 100 mg, HS PRN    Administrative Statements   Today, Patient Was Seen By: Jackie Lyons PA-C      **Please Note: This note may have been constructed using a voice recognition system.**

## 2025-05-19 NOTE — ASSESSMENT & PLAN NOTE
Lab Results   Component Value Date    HGBA1C 10.4 (H) 04/06/2025     Recent Labs     05/18/25 2058 05/19/25  0017 05/19/25 0546 05/19/25 0622   POCGLU 339* 300* 262* 277*   Continue sliding scale insulin coverage with Accu-Cheks  Add long-acting insulin-Lantus 7 units at night for better glucose control  Add Premeal insulin 5 units TID daily with meals

## 2025-05-19 NOTE — ANESTHESIA POSTPROCEDURE EVALUATION
Post-Op Assessment Note    CV Status:  Stable    Pain management: adequate       Mental Status:  Alert and awake   Hydration Status:  Euvolemic   PONV Controlled:  Controlled   Airway Patency:  Patent     Post Op Vitals Reviewed: Yes    No anethesia notable event occurred.    Staff: Anesthesiologist           Last Filed PACU Vitals:  Vitals Value Taken Time   Temp 97 °F (36.1 °C) 05/19/25 12:56   Pulse 73 05/19/25 13:40   /74 05/19/25 13:40   Resp 18 05/19/25 13:40   SpO2 95 % 05/19/25 13:40       Modified Rudy:     Vitals Value Taken Time   Activity 2 05/19/25 13:40   Respiration 2 05/19/25 13:40   Circulation 1 05/19/25 13:40   Consciousness 1 05/19/25 13:40   Oxygen Saturation 2 05/19/25 13:40     Modified Rudy Score: 8

## 2025-05-19 NOTE — ASSESSMENT & PLAN NOTE
Recent Labs     05/17/25  1906 05/18/25  0512 05/19/25  0449   SODIUM 129* 131* 130*   Does have mild hyponatremia with sodium 129 at time of admission, improved to 131 with IV fluids  Likely secondary to hypovolemia in the setting of poor oral intake  Continue IV fluids pending improved oral intake

## 2025-05-19 NOTE — ANESTHESIA PREPROCEDURE EVALUATION
Procedure:  EGD    Relevant Problems   CARDIO   (+) Essential hypertension      ENDO   (+) Type 2 diabetes mellitus with chronic kidney disease, without long-term current use of insulin (HCC) (A1c >10)      GI/HEPATIC   (+) Other dysphagia      /RENAL   (+) Chronic kidney disease, stage 3 (HCC)   (+) Nephrolithiasis      HEMATOLOGY   (+) Anemia      MUSCULOSKELETAL   (+) Low back pain with sciatica      PULMONARY   (+) JEWEL (obstructive sleep apnea)        Physical Exam    Airway     Mallampati score: III          Cardiovascular  Cardiovascular exam normal    Dental   No notable dental hx edentulous    Pulmonary  Pulmonary exam normal     Neurological      Other Findings        Anesthesia Plan  ASA Score- 3     Anesthesia Type- IV sedation with anesthesia with ASA Monitors.         Additional Monitors:     Airway Plan: natural airway.           Plan Factors-    Chart reviewed.   Existing labs reviewed. Patient summary reviewed.    Patient is not a current smoker.              Induction- intravenous.    Postoperative Plan- .   Monitoring Plan - Monitoring plan - standard ASA monitoring      Perioperative Resuscitation Plan - Level 1 - Full Code.       Informed Consent- Anesthetic plan and risks discussed with patient.        NPO Status:  Vitals Value Taken Time   Date of last liquid 05/19/25 05/19/25 11:26   Time of last liquid 0800 05/19/25 11:26   Date of last solid 05/18/25 05/19/25 11:26   Time of last solid 2000 05/19/25 11:26

## 2025-05-19 NOTE — PLAN OF CARE
Problem: METABOLIC, FLUID AND ELECTROLYTES - ADULT  Goal: Electrolytes maintained within normal limits  Description: INTERVENTIONS:  - Monitor labs and assess patient for signs and symptoms of electrolyte imbalances  - Administer electrolyte replacement as ordered  - Monitor response to electrolyte replacements, including repeat lab results as appropriate  - Instruct patient on fluid and nutrition as appropriate  Outcome: Progressing  Goal: Glucose maintained within target range  Description: INTERVENTIONS:  - Monitor Blood Glucose as ordered  - Assess for signs and symptoms of hyperglycemia and hypoglycemia  - Administer ordered medications to maintain glucose within target range  - Assess nutritional intake and initiate nutrition service referral as needed  Outcome: Progressing         Problem: Nutrition/Hydration-ADULT  Goal: Nutrient/Hydration intake appropriate for improving, restoring or maintaining nutritional needs  Description: Monitor and assess patient's nutrition/hydration status for malnutrition. Collaborate with interdisciplinary team and initiate plan and interventions as ordered.  Monitor patient's weight and dietary intake as ordered or per policy. Utilize nutrition screening tool and intervene as necessary. Determine patient's food preferences and provide high-protein, high-caloric foods as appropriate.     INTERVENTIONS:  - Monitor oral intake, urinary output, labs, and treatment plans  - Assess nutrition and hydration status and recommend course of action  - Evaluate amount of meals eaten  - Assist patient with eating if necessary   - Allow adequate time for meals  - Recommend/ encourage appropriate diets, oral nutritional supplements, and vitamin/mineral supplements  - Order, calculate, and assess calorie counts as needed  - Recommend, monitor, and adjust tube feedings and TPN/PPN based on assessed needs  - Assess need for intravenous fluids  - Provide specific nutrition/hydration education as  appropriate  - Include patient/family/caregiver in decisions related to nutrition  5/18/2025 2059 by Nia Bates RN  Outcome: Progressing  5/18/2025 2059 by Nia Bates RN  Outcome: Progressing       Problem: SAFETY ADULT  Goal: Maintain or return to baseline ADL function  Description: INTERVENTIONS:  -  Assess patient's ability to carry out ADLs; assess patient's baseline for ADL function and identify physical deficits which impact ability to perform ADLs (bathing, care of mouth/teeth, toileting, grooming, dressing, etc.)  - Assess/evaluate cause of self-care deficits   - Assess range of motion  - Assess patient's mobility; develop plan if impaired  - Assess patient's need for assistive devices and provide as appropriate  - Encourage maximum independence but intervene and supervise when necessary  - Involve family in performance of ADLs  - Assess for home care needs following discharge   - Consider OT consult to assist with ADL evaluation and planning for discharge  - Provide patient education as appropriate  - Monitor functional capacity and physical performance, use of AM PAC & JH-HLM   - Monitor gait, balance and fatigue with ambulation    5/18/2025 2059 by Nia Bates RN  Outcome: Progressing  5/18/2025 2059 by Nia Bates RN  Outcome: Progressing         Problem: PAIN - ADULT  Goal: Verbalizes/displays adequate comfort level or baseline comfort level  Description: Interventions:  - Encourage patient to monitor pain and request assistance  - Assess pain using appropriate pain scale  - Administer analgesics as ordered based on type and severity of pain and evaluate response  - Implement non-pharmacological measures as appropriate and evaluate response  - Consider cultural and social influences on pain and pain management  - Notify physician/advanced practitioner if interventions unsuccessful or patient reports new pain  - Educate patient/family on pain management process including their role and  importance of  reporting pain   - Provide non-pharmacologic/complimentary pain relief interventions  5/18/2025 2059 by Nia Bates RN  Outcome: Progressing  5/18/2025 2059 by Nia Bates RN  Outcome: Progressing     Problem: INFECTION - ADULT  Goal: Absence or prevention of progression during hospitalization  Description: INTERVENTIONS:  - Assess and monitor for signs and symptoms of infection  - Monitor lab/diagnostic results  - Monitor all insertion sites, i.e. indwelling lines, tubes, and drains  - Monitor endotracheal if appropriate and nasal secretions for changes in amount and color  - [place] appropriate cooling/warming therapies per order  - Administer medications as ordered  - Instruct and encourage patient and family to use good hand hygiene technique  - Identify and instruct in appropriate isolation precautions for identified infection/condition  5/18/2025 2059 by Nia Bates RN  Outcome: Progressing  5/18/2025 2059 by Nia Bates RN  Outcome: Progressing

## 2025-05-20 ENCOUNTER — TELEPHONE (OUTPATIENT)
Age: 60
End: 2025-05-20

## 2025-05-20 VITALS
RESPIRATION RATE: 18 BRPM | HEART RATE: 70 BPM | SYSTOLIC BLOOD PRESSURE: 159 MMHG | BODY MASS INDEX: 38.47 KG/M2 | OXYGEN SATURATION: 92 % | DIASTOLIC BLOOD PRESSURE: 94 MMHG | HEIGHT: 72 IN | WEIGHT: 284 LBS | TEMPERATURE: 97.8 F

## 2025-05-20 DIAGNOSIS — M79.605 LEFT LEG PAIN: ICD-10-CM

## 2025-05-20 DIAGNOSIS — R60.9 EDEMA, UNSPECIFIED TYPE: Primary | ICD-10-CM

## 2025-05-20 PROBLEM — K22.89 ESOPHAGEAL MASS: Status: ACTIVE | Noted: 2025-05-20

## 2025-05-20 LAB
ANION GAP SERPL CALCULATED.3IONS-SCNC: 7 MMOL/L (ref 4–13)
BUN SERPL-MCNC: 28 MG/DL (ref 5–25)
CALCIUM SERPL-MCNC: 8.1 MG/DL (ref 8.4–10.2)
CHLORIDE SERPL-SCNC: 103 MMOL/L (ref 96–108)
CO2 SERPL-SCNC: 23 MMOL/L (ref 21–32)
CREAT SERPL-MCNC: 1.42 MG/DL (ref 0.6–1.3)
GFR SERPL CREATININE-BSD FRML MDRD: 52 ML/MIN/1.73SQ M
GLUCOSE SERPL-MCNC: 226 MG/DL (ref 65–140)
GLUCOSE SERPL-MCNC: 245 MG/DL (ref 65–140)
GLUCOSE SERPL-MCNC: 289 MG/DL (ref 65–140)
HCV AB SER QL: NORMAL
POTASSIUM SERPL-SCNC: 4.4 MMOL/L (ref 3.5–5.3)
SODIUM SERPL-SCNC: 133 MMOL/L (ref 135–147)

## 2025-05-20 PROCEDURE — 86803 HEPATITIS C AB TEST: CPT | Performed by: INTERNAL MEDICINE

## 2025-05-20 PROCEDURE — 80048 BASIC METABOLIC PNL TOTAL CA: CPT | Performed by: PHYSICIAN ASSISTANT

## 2025-05-20 PROCEDURE — 92610 EVALUATE SWALLOWING FUNCTION: CPT

## 2025-05-20 PROCEDURE — 82948 REAGENT STRIP/BLOOD GLUCOSE: CPT

## 2025-05-20 PROCEDURE — 99239 HOSP IP/OBS DSCHRG MGMT >30: CPT | Performed by: PHYSICIAN ASSISTANT

## 2025-05-20 RX ORDER — HYDROCHLOROTHIAZIDE 12.5 MG/1
12.5 TABLET ORAL 2 TIMES DAILY
Qty: 100 TABLET | Refills: 3 | Status: SHIPPED | OUTPATIENT
Start: 2025-05-20

## 2025-05-20 RX ORDER — PANTOPRAZOLE SODIUM 40 MG/1
40 TABLET, DELAYED RELEASE ORAL
Qty: 60 TABLET | Refills: 0 | Status: SHIPPED | OUTPATIENT
Start: 2025-05-20

## 2025-05-20 RX ORDER — PANTOPRAZOLE SODIUM 40 MG/1
40 TABLET, DELAYED RELEASE ORAL
Status: DISCONTINUED | OUTPATIENT
Start: 2025-05-20 | End: 2025-05-20 | Stop reason: HOSPADM

## 2025-05-20 RX ADMIN — ACETAMINOPHEN 650 MG: 325 TABLET, FILM COATED ORAL at 05:38

## 2025-05-20 RX ADMIN — SODIUM CHLORIDE 100 ML/HR: 0.9 INJECTION, SOLUTION INTRAVENOUS at 05:38

## 2025-05-20 RX ADMIN — ACETAMINOPHEN 650 MG: 325 TABLET, FILM COATED ORAL at 13:47

## 2025-05-20 RX ADMIN — CARVEDILOL 25 MG: 25 TABLET, FILM COATED ORAL at 08:44

## 2025-05-20 RX ADMIN — PANTOPRAZOLE SODIUM 40 MG: 40 TABLET, DELAYED RELEASE ORAL at 08:44

## 2025-05-20 RX ADMIN — LISINOPRIL 20 MG: 20 TABLET ORAL at 08:44

## 2025-05-20 NOTE — ASSESSMENT & PLAN NOTE
Recent Labs     05/18/25  0512 05/19/25  0449 05/20/25  0621   SODIUM 131* 130* 133*   Does have mild hyponatremia with sodium 129 at time of admission, improved to 133 with IV fluids  Likely secondary to hypovolemia in the setting of poor oral intake   no

## 2025-05-20 NOTE — ASSESSMENT & PLAN NOTE
BP elevated at time of admission  Maintained outpatient on Coreg, lisinopril, HCTZ  BP improved with resumption of medication

## 2025-05-20 NOTE — DISCHARGE INSTR - AVS FIRST PAGE
Dear Yoni Castillo,     [facility].  It is our hope that we were always able to exceed the expected standards for your care during your stay.  You were hospitalized due to trouble swallowing.  You were cared for on the 5th floor by Jackie Lyons PA-C under the service of Naveen Mims MD with the [facility] who covers for your primary care physician (PCP), Judd Ji MD, while you were hospitalized.  If you have any questions or concerns related to this hospitalization, you may contact us at .  For follow up as well as any medication refills, we recommend that you follow up with your primary care physician.  A registered nurse will reach out to you by phone within a few days after your discharge to answer any additional questions that you may have after going home.  However, at this time we provide for you here, the most important instructions / recommendations at discharge:       Notable Medication Adjustments -   You were found to have esophageal mass - biopsies were taken  Please follow-up with medical oncology in 1 week's time    Other Instructions -   Please follow a modified diet that is with soft foods and moist foods or anything else that you can tolerate.    Please take small sips and eat slowly  Patient educated to chew food very well.   Try to avoid dense foods   Add moisture/condiments to dry food  Avoid dry dense meats.   Educated to elevate head of bed at rest.     Please continue to follow diabetic diet low in sugar and carbohydrates  You should follow-up with your PCP in regards to your blood sugars and adjusting diabetic regimen given you did have some elevated blood sugars in the hospital  You were offered to start metformin but declined   You were offered to start insulin but declined    Please review this entire after visit summary as additional general instructions  including medication list, appointments, activity, diet, any pertinent wound care, and other additional recommendations from your care team that may be provided for you.      Sincerely,     Jackie Lyons PA-C

## 2025-05-20 NOTE — DISCHARGE SUMMARY
"Discharge Summary - Hospitalist   Name: Yoni Castillo 60 y.o. male I MRN: [id number]  Unit/Bed#: E5 -01 I Date of Admission: 5/17/2025   Date of Service: 5/20/2025 I Hospital Day: 3     Assessment & Plan  Other dysphagia  Esophageal mass  Patient presented to the ER with complaint of difficulty swallowing  Appears to be an acute on chronic problem - denies previous workup but has had difficulty for \"years\"  Reports repetitive vomiting which prompted evaluation to the ED  CT chest did show mild wall thickening and hyperenhancement of distal thoracic esophagus compatible with esophagitis  Seen by gastroenterology  Underwent EGD 5/19/25 revealing  Single malignant appearing mass in the lower third of esophagus; performed cold forceps biopsies with partial removal,   C3 M4 Butler's esophagus with associated lesion, 3 cm hiatal hernia  Upper third and middle third of esophagus appearing normal, edematous, erythematous, granular mucosa and cardia, fundus of stomach, body of stomach, antrum; performed cold forceps biopsy to rule out H. pylori,   Duodenal bulb and second part of  duodenum appeared normal  Pathology from 5/19/2025 EGD pending  Patient was resumed on diet as tolerated  Continue PPI twice daily  Needs oncology and surgical oncology follow-up to determine further treatment plan based off EGD biopsies  Hyponatremia  Recent Labs     05/18/25  0512 05/19/25  0449 05/20/25  0621   SODIUM 131* 130* 133*   Does have mild hyponatremia with sodium 129 at time of admission, improved to 133 with IV fluids  Likely secondary to hypovolemia in the setting of poor oral intake  Essential hypertension  BP elevated at time of admission  Maintained outpatient on Coreg, lisinopril, HCTZ  BP improved with resumption of medication  Type 2 diabetes mellitus with chronic kidney disease, without long-term current use of insulin ([facility])  Lab Results   Component Value Date    HGBA1C 10.4 (H) 04/06/2025     Recent Labs     " 05/19/25  0622 05/19/25  1625 05/19/25  2156 05/20/25  0619   POCGLU 277* 212* 282* 226*   Home regimen of Lantus-held while inpatient placed on insulin sliding scale and long-acting insulin while inpatient  Recommend to follow-up with PCP in regards to further diabetic regimen given elevated sugar levels while hospitalized  Discussed diabetic diet  Class 2 severe obesity with serious comorbidity in adult (HCC)  Affects all aspects of care  Would benefit from weight loss  Chronic kidney disease, stage 3 (HCC)  Lab Results   Component Value Date    EGFR 52 05/20/2025    EGFR 54 05/19/2025    EGFR 57 05/18/2025    CREATININE 1.42 (H) 05/20/2025    CREATININE 1.37 (H) 05/19/2025    CREATININE 1.31 (H) 05/18/2025   Currently at baseline continue to monitor  JEWEL (obstructive sleep apnea)  Declines-not currently on CPAP       Consultations During Hospital Stay:  Gastroenterology  Speech therapy    Procedures Performed:   EGD  Result Date: 5/19/2025  Impression: Single malignant-appearing mass in the lower third of the esophagus; performed cold forceps biopsy with partial removal C3M4 Btuler's esophagus with associated lesion 3 cm hiatal hernia The upper third of the esophagus and middle third of the esophagus appeared normal. Edematous, erythematous, granular mucosa in the cardia, fundus of the stomach, body of the stomach, incisura and antrum; performed cold forceps biopsy to rule out H. pylori The duodenal bulb and 2nd part of the duodenum appeared normal. RECOMMENDATION: Await pathology results Return to floor Resume diet as tolerated Continue twice daily PPI Pending pathology results will need referral to medical and surgical oncology teams    Carlo Montoya MD     CT chest abdomen pelvis w contrast  Result Date: 5/17/2025  Impression: Mild wall thickening and hyperenhancement of the distal thoracic esophagus compatible with esophagitis. Ingested material retained within the mid thoracic esophagus. Gastroenterology  consult recommended Workstation performed: [id number]     Significant Findings / Test Results:   Esophageal mass -biopsies pending- (suspected malignancy)     Incidental Findings:   Hyperglycemia with elevated hemoglobin A1c of 10.4  Hyponatremia    Test Results Pending at Discharge (will require follow up):   Esophageal biopsies from 5/19/2025     Outpatient follow-up requested:  Hematology/oncology-1 week-ambulatory referral sent  Surgical oncology-1 week- ambulatory referral sent    Complications: Ongoing dysphagia    Hospital Course:     Yoni Castillo is a 60 y.o. male patient who originally presented to the hospital on 5/17/2025 due to repetitive vomiting episodes over the past few weeks.  Patient developed abdominal pain and was unable to keep anything down.  This prompted him to seek evaluation in the emergency room.  Upon arrival, he had elevated blood pressures, hyponatremia 129, creatinine 1.38, hyperglycemia glucose 236.  He complained of ongoing trouble swallowing.  CT chest revealed mild wall thickening and hyperenhancement of distal thoracic esophagus compatible with esophagitis.  Ingested material retained within mid thoracic esophagus.  He was seen in consultation by gastroenterology.  He ultimately underwent EGD on 5/19/2025.  Findings revealed esophageal mass which was concerning for malignancy.  Biopsies were taken and partially removed.  Patient instructed he will need follow-up with medical oncology and surgical oncology to determine further treatment plan pending biopsies.  Patient was counseled on modified diet and importance of ongoing oral intake along with eating slow, small sips, and moist food.  He was seen in consultation by speech therapy who provided additional recommendations.  Patient will be discharged on Protonix 40 mg twice daily.  Additionally, his elevated blood sugars were discussed as he did receive insulin while hospitalized.  Discussed possibly starting metformin however  patient declined.  Also discussed hemoglobin A1c being elevated at 10.4 and indication for insulin-patient declined.  Recommend attempting better glucose control and modifications to diet along with follow-up with PCP for ongoing management in the outpatient setting. in conclusion, patient is stable for discharge at this time.  Recommend follow-up as listed above.    Condition at Discharge: stable     Discharge Day Visit / Exam:     Subjective: Resting in bed.  Discussed results of biopsies yesterday revealing esophageal mass.  Discussed biopsies pending and need to follow-up with hematology/oncology as well as surgical oncology in outpatient setting to further determine treatment plan.  Discussed modifications to diet.    Vitals: Blood Pressure: 159/94 (05/20/25 0751)  Pulse: 70 (05/20/25 0751)  Temperature: 97.8 °F (36.6 °C) (05/20/25 0751)  Temp Source: Oral (05/20/25 0751)  Respirations: 18 (05/20/25 0751)  Height: 6' (182.9 cm) (05/19/25 1130)  Weight - Scale: 129 kg (284 lb) (05/19/25 1130)  SpO2: 92 % (05/20/25 0751)    Exam:   Physical Exam  Vitals and nursing note reviewed.   Constitutional:       General: He is not in acute distress.     Appearance: He is obese. He is not ill-appearing, toxic-appearing or diaphoretic.   HENT:      Head: Normocephalic and atraumatic.     Eyes:      General: No scleral icterus.      Cardiovascular:      Rate and Rhythm: Normal rate and regular rhythm.   Pulmonary:      Effort: Pulmonary effort is normal. No respiratory distress.      Breath sounds: Normal breath sounds. No stridor. No wheezing or rhonchi.   Abdominal:      General: Abdomen is flat. There is no distension.      Palpations: Abdomen is soft. There is no mass.      Tenderness: There is no abdominal tenderness.      Hernia: No hernia is present.     Musculoskeletal:         General: No swelling.      Cervical back: Neck supple.     Skin:     General: Skin is warm and dry.     Neurological:      General: No focal  deficit present.      Mental Status: He is alert and oriented to person, place, and time. Mental status is at baseline.     Psychiatric:         Mood and Affect: Mood normal.         Behavior: Behavior normal.     Discharge instructions/Information to patient and family:   See after visit summary for information provided to patient and family.      Provisions for Follow-Up Care:  See after visit summary for information related to follow-up care and any pertinent home health orders.      Planned Readmission: no     Discharge Statement:   This time was spent on the day of discharge. I had direct contact with the patient on the day of discharge. Greater than 50% of the total time was spent examining patient, answering all patient questions, arranging and discussing plan of care with patient as well as directly providing post-discharge instructions.  Additional time then spent on discharge activities.    Discharge Medications:  See after visit summary for reconciled discharge medications provided to patient and family.      ** Please Note: This note has been constructed using a voice recognition system **

## 2025-05-20 NOTE — ASSESSMENT & PLAN NOTE
"Patient presented to the ER with complaint of difficulty swallowing  Appears to be an acute on chronic problem - denies previous workup but has had difficulty for \"years\"  Reports repetitive vomiting which prompted evaluation to the ED  CT chest did show mild wall thickening and hyperenhancement of distal thoracic esophagus compatible with esophagitis  Seen by gastroenterology  Underwent EGD 5/19/25 revealing  Single malignant appearing mass in the lower third of esophagus; performed cold forceps biopsies with partial removal,   C3 M4 Butler's esophagus with associated lesion, 3 cm hiatal hernia  Upper third and middle third of esophagus appearing normal, edematous, erythematous, granular mucosa and cardia, fundus of stomach, body of stomach, antrum; performed cold forceps biopsy to rule out H. pylori,   Duodenal bulb and second part of  duodenum appeared normal  Pathology from 5/19/2025 EGD pending  Patient was resumed on diet as tolerated  Continue PPI twice daily  Needs oncology and surgical oncology follow-up to determine further treatment plan based off EGD biopsies  "

## 2025-05-20 NOTE — ASSESSMENT & PLAN NOTE
Lab Results   Component Value Date    EGFR 52 05/20/2025    EGFR 54 05/19/2025    EGFR 57 05/18/2025    CREATININE 1.42 (H) 05/20/2025    CREATININE 1.37 (H) 05/19/2025    CREATININE 1.31 (H) 05/18/2025   Currently at baseline continue to monitor

## 2025-05-20 NOTE — ASSESSMENT & PLAN NOTE
Lab Results   Component Value Date    HGBA1C 10.4 (H) 04/06/2025     Recent Labs     05/19/25  0622 05/19/25  1625 05/19/25  2156 05/20/25  0619   POCGLU 277* 212* 282* 226*   Home regimen of Lantus-held while inpatient placed on insulin sliding scale and long-acting insulin while inpatient  Recommend to follow-up with PCP in regards to further diabetic regimen given elevated sugar levels while hospitalized  Discussed diabetic diet

## 2025-05-20 NOTE — TELEPHONE ENCOUNTER
Patient is being discharged today and states they have withheld his hctz. His feet are swelling badly and  wanted to know if Dr Ji could send a script for the HCTZ to help get the fluid out.   [facility] - [address], [phone]     Please advise patient.    Thanks

## 2025-05-20 NOTE — SPEECH THERAPY NOTE
Speech Language/Pathology  Speech/Language Pathology  Assessment    Patient Name: Yoni Castillo  Today's Date: 5/20/2025     Problem List  Principal Problem:    Other dysphagia  Active Problems:    Essential hypertension    Type 2 diabetes mellitus with chronic kidney disease, without long-term current use of insulin ([facility])    Class 2 severe obesity with serious comorbidity in adult ([facility])    Chronic kidney disease, stage 3 (HCC)    JEWEL (obstructive sleep apnea)    Hyponatremia    Past Medical History  Past Medical History:   Diagnosis Date    Allergic 1968    Seasonal    Arthritis 2012    Brain concussion Multiple    Cervical disc disease     Chronic kidney disease 2012    Chronic pain disorder     CTS (carpal tunnel syndrome) 2002    Diabetes mellitus (HCC)     Full dentures     only using upper    Head injury 1975    Headache(784.0) 1980    High blood sugar     Hypertension     Kidney stone     Liver disease     Low back pain     Cannot remember    Lumbar disc disease     Lumbosacral disc disease     Cannot remember    Migraines     Neuropathy in diabetes (HCC)     RLS (restless legs syndrome)     Skin cancer     in past    Thoracic disc disorder     Cannot remember    Visual impairment 2017    Cataracts. Surgery to correct in 2021     Past Surgical History  Past Surgical History:   Procedure Laterality Date    AMPUTATION  2022    Little toe left    CARPAL TUNNEL RELEASE  2002    CATARACT EXTRACTION Bilateral     COLONOSCOPY      CONTRACTURE Right 01/20/2025    Procedure: Right wrist and hand extensor tenolysis and wrist capsulotomy;  Surgeon: Carroll Mccarthy MD;  [facility];  Service: Orthopedics    EYE SURGERY  2021    FOOT SURGERY  2022    Left Foot    FRACTURE SURGERY  1968    Right Tib/Fib    GANGLION CYST EXCISION Left 03/25/2024    Procedure: EXCISION MUCOID CYST, INDEX FINGER DIP;  Surgeon: Carroll Mccarthy MD;  [facility];  Service: Orthopedics    GANGLION CYST EXCISION  Right 05/20/2024    Procedure: EXCISION MUCOID CYST - RIGHT INDEX AND MIDDLE FINGERS;  Surgeon: Carroll Mccarthy MD;  [facility];  Service: Orthopedics    HERNIA REPAIR      INCISION AND DRAINAGE  01/16/2024    IR NEPHROURETERAL ACCESS FOR UROLOGY PCNL  04/07/2025    KIDNEY SURGERY Right 06/2012    KNEE SURGERY Right 1980    MOUTH SURGERY      MN AMPUTATION METATARSAL W/TOE SINGLE Left 6/1/2022    Procedure: RAY RESECTION FOOT;  Surgeon: Hannah Melgoza DPM;  [facility];  Service: Podiatry    MN INCISION & DRAINAGE ABSCESS COMPLICATED/MULTIPLE Right 09/17/2024    Procedure: Irrigation and debridement right wrist and hand, any indicated procedures;  Surgeon: Carroll Mccarthy MD;  [facility];  Service: Orthopedics    MN INCISION EXTENSOR TENDON SHEATH WRIST Right 01/20/2025    Procedure: RELEASE DEQUERVAINS - Right;  Surgeon: Carroll Mccarthy MD;  [facility];  Service: Orthopedics    MN PERQ NL/PL LITHOTRP COMPLEX >2 CM MLT LOCATIONS Right 04/11/2025    Procedure: NEPHROLITHOTOMY  PERCUTANEOUS (PCNL);  Surgeon: Adis Hamilton MD;  [facility];  Service: Urology    MN RPR AA HERNIA 1ST < 3 CM REDUCIBLE N/A 7/14/2023    Procedure: REPAIR HERNIA INCISIONAL;  Surgeon: Matt Melo MD;  [facility];  Service: General    MN SYNOVECTOMY EXTENSOR TENDON SHTH WRIST 1 CMPRT Right 10/03/2024    Procedure: Irrigation and debridement, right wrist, volar and dorsal, possible extensor and flexor tenosynovectomy, any indicated procedures;  Surgeon: Carroll Mccarthy MD;  [facility];  Service: Orthopedics    MN SYNOVECTOMY EXTENSOR TENDON SHTH WRIST 1 CMPRT Right 03/24/2025    Procedure: SYNOVECTOMY / DEBRIDEMENT - FLEXOR CARPI RADIALIS AND FLEXOR CARPI ULNARIS TENDONS- RIGHT;  Surgeon: Carroll Mccarthy MD;  [facility];  Service: Orthopedics    TONSILLECTOMY  1968    Yes    WOUND DEBRIDEMENT Left 4/28/2022     Procedure: Left foot washout;  Surgeon: Hannah Melgoza DPM;  [facility];  Service: Podiatry    WOUND DEBRIDEMENT Left 6/6/2022    Procedure: DEBRIDEMENT WOUND (WASH OUT);  Surgeon: Hannah Melgoza DPM;  [facility];  Service: Podiatry        Bedside Swallow Evaluation:    Summary:  Pt presented w/ vocal quality.  Stated it has been this way for years.  Denied any difficulty with liquids.  Only complaint is periodically with solids.  Patient pointed to stomach area stating things do not want to go down.  Patient took liquids WNL.  Prompt transfer and swallow.  No cough or wet vocal quality.  He had completed some of his breakfast potatoes and eggs.  Stated they were somewhat difficult to get down but then also stated he had a bagel a few days ago that went down okay.  Patient educated to chew food very well.  Try to avoid dense foods or add moisture/condiments.  Avoid dry dense meats.  He is reporting he will likely get protein shakes.  Educated to elevate head of bed at rest.  He stated he likes to sleep on his stomach.  Oral pharyngeal stages WNL.  S/S esophageal in nature secondary to mass.  Defer to GI for further recommendations.    Recommendations:  Diet: Solids as patient can tolerate  Liquid: Thin  Meds: Currently denying any difficulty.  As tolerated.  Supervision: As needed  Positioning:Upright  Strategies: Chew well, slow rate, alternate with sips, avoid foods that are troublesome.  Avoid dry dense foods as needed.  Add moisture/condiments.  Pt to take PO/Meds only when fully alert and upright.   Oral care  Reflux precautions  Eval only, No f/u tx indicated.     Consider consult w/:  GI continue to follow-up  Nutrition    H&P/Admit info/ as per pertinent provider notes: (PMH noted above)  Chief Complaint: Odynophagia  Yoni Castillo is a 60 y.o. male with a PMH of past medical history of type 2 diabetes, obesity, hypertension, CKD stage II coming in for difficulty  swallowing.  Odynophagia  Patient is very pleasant  Claims that he has had difficulty swallowing both solids and liquids.  Recently liquids have been regurgitated after attempting to swallow  CT chest abdomen pelvis shows mild wall thickening and hyperenhancement of the distal thoracic esophagus compatible with esophagitis  Patient has drinking history and also drinks alcohol  No previous EGD as noted in the chart  Plan:  PPI twice daily  Continue IV fluids  GI consult  Patient also takes tramadol mostly at bedtime.  Will order his home dose    Special Studies: (refer to reports for complete details)  Egd 5/19:  IMPRESSION:  Single malignant-appearing mass in the lower third of the esophagus; performed cold forceps biopsy with partial removal  C3M4 Ubtler's esophagus with associated lesion  3 cm hiatal hernia  The upper third of the esophagus and middle third of the esophagus appeared normal.  Edematous, erythematous, granular mucosa in the cardia, fundus of the stomach, body of the stomach, incisura and antrum; performed cold forceps biopsy to rule out H. pylori  The duodenal bulb and 2nd part of the duodenum appeared normal.  RECOMMENDATION:  Await pathology results   Return to floor  Resume diet as tolerated  Continue twice daily PPI  Pending pathology results will need referral to medical and surgical oncology teams   5/7 MRI brain:  White matter changes suggestive of chronic microangiopathy. No acute intracranial pathology.     Sodium 135-147mmol/L 123 on 5/20/2025         Procalcitonin<=0.25ng/ml    WBC  4.31-10.16 Thousand/uL 5.18 on 5/19     Previous MBS:  None in epic    Patient's goal: Stated he would figured out    Did the pt report pain?  No  If yes, was nursing notified/was it addressed?    Reason for consult:  Determine safest and least restrictive diet  C/o solid food dysphagia  Education    Precautions:  Hand hygeine  Reflux    BMI:38.52  Food Allergies:  nkfa   Current Diet:  Surgical soft (which  is not a soft diet)   Premorbid diet: Regular   O2 requirement:  RA   Social/Prior living  Home w/ spouse   Voice/Speech:  Hoarse, reports not new   Follows commands:  yes   Cognitive status:  alert     Oral [facility] exam:  Dentition: upper plate  Lips (VII):+  Tongue (XII):+  Velum (X):+  Secretion management:+    Esophageal stage:  See egd    Results d/w:  Pt, nursing, PA

## 2025-05-20 NOTE — PLAN OF CARE
Problem: PAIN - ADULT  Goal: Verbalizes/displays adequate comfort level or baseline comfort level  Description: Interventions:  - Encourage patient to monitor pain and request assistance  - Assess pain using appropriate pain scale  - Administer analgesics as ordered based on type and severity of pain and evaluate response  - Implement non-pharmacological measures as appropriate and evaluate response  - Consider cultural and social influences on pain and pain management  - Notify physician/advanced practitioner if interventions unsuccessful or patient reports new pain  - Educate patient/family on pain management process including their role and importance of  reporting pain   - Provide non-pharmacologic/complimentary pain relief interventions  Outcome: Progressing     Problem: INFECTION - ADULT  Goal: Absence or prevention of progression during hospitalization  Description: INTERVENTIONS:  - Assess and monitor for signs and symptoms of infection  - Monitor lab/diagnostic results  - Monitor all insertion sites, i.e. indwelling lines, tubes, and drains  - Monitor endotracheal if appropriate and nasal secretions for changes in amount and color  - [place] appropriate cooling/warming therapies per order  - Administer medications as ordered  - Instruct and encourage patient and family to use good hand hygiene technique  - Identify and instruct in appropriate isolation precautions for identified infection/condition  Outcome: Progressing  Goal: Absence of fever/infection during neutropenic period  Description: INTERVENTIONS:  - Monitor WBC  - Perform strict hand hygiene  - Limit to healthy visitors only  - No plants, dried, fresh or silk flowers with melara in patient room  Outcome: Progressing     Problem: DISCHARGE PLANNING  Goal: Discharge to home or other facility with appropriate resources  Description: INTERVENTIONS:  - Identify barriers to discharge w/patient and caregiver  - Arrange for needed discharge resources  and transportation as appropriate  - Identify discharge learning needs (meds, wound care, etc.)  - Arrange for interpretive services to assist at discharge as needed  - Refer to Case Management Department for coordinating discharge planning if the patient needs post-hospital services based on physician/advanced practitioner order or complex needs related to functional status, cognitive ability, or social support system  Outcome: Progressing     Problem: Nutrition/Hydration-ADULT  Goal: Nutrient/Hydration intake appropriate for improving, restoring or maintaining nutritional needs  Description: Monitor and assess patient's nutrition/hydration status for malnutrition. Collaborate with interdisciplinary team and initiate plan and interventions as ordered.  Monitor patient's weight and dietary intake as ordered or per policy. Utilize nutrition screening tool and intervene as necessary. Determine patient's food preferences and provide high-protein, high-caloric foods as appropriate.     INTERVENTIONS:  - Monitor oral intake, urinary output, labs, and treatment plans  - Assess nutrition and hydration status and recommend course of action  - Evaluate amount of meals eaten  - Assist patient with eating if necessary   - Allow adequate time for meals  - Recommend/ encourage appropriate diets, oral nutritional supplements, and vitamin/mineral supplements  - Order, calculate, and assess calorie counts as needed  - Recommend, monitor, and adjust tube feedings and TPN/PPN based on assessed needs  - Assess need for intravenous fluids  - Provide specific nutrition/hydration education as appropriate  - Include patient/family/caregiver in decisions related to nutrition  Outcome: Progressing

## 2025-05-21 ENCOUNTER — TRANSITIONAL CARE MANAGEMENT (OUTPATIENT)
Age: 60
End: 2025-05-21

## 2025-05-21 ENCOUNTER — DOCUMENTATION (OUTPATIENT)
Dept: HEMATOLOGY ONCOLOGY | Facility: CLINIC | Age: 60
End: 2025-05-21

## 2025-05-21 ENCOUNTER — TELEPHONE (OUTPATIENT)
Age: 60
End: 2025-05-21

## 2025-05-21 RX ORDER — TRAMADOL HYDROCHLORIDE 50 MG/1
100 TABLET ORAL
Qty: 60 TABLET | Refills: 0 | Status: SHIPPED | OUTPATIENT
Start: 2025-05-21

## 2025-05-21 NOTE — TELEPHONE ENCOUNTER
Patient returned call from the office. He was recently in the hospital. Please call back to schedule transition of care appointment. Thank you!

## 2025-05-21 NOTE — PROGRESS NOTES
All records needed are in patients chart. No records retrieval needed at this time.    Pt referred to surgonc/thoracic (7 days), medonc (10 days), radonc (14 days).  Please notify me if not scheduled within time frame.    Referral received/ Chart reviewed for work up completed     Imaging completed:    [] PET/CT   [] MRI   [] CT   [] US   [] Mammo   [] Bone scan   [x] N/A    Pathology completed:    Date:   Location:   [x]N/A    Additional records needed:    [] Genomic report   [] Genetic testing results   [] Office Note   [] Procedure/ Operative note   [] Lab results   [x] N/A      [] Radiation Oncology records retrieval needed (PN to route to rad/onc clerical pool once scheduled)  Date:  Location:      Chart rvw'd on 25 & 25    Referring Provider: Jackie Lyons PA-C    Diagnosis: esophageal mass    EGD/EUS: 25  Impression:  Single malignant-appearing mass in the lower third of the esophagus; performed cold forceps biopsy with partial removal  C3M4 Butler's esophagus with associated lesion  3 cm hiatal hernia  The upper third of the esophagus and middle third of the esophagus appeared normal.  Edematous, erythematous, granular mucosa in the cardia, fundus of the stomach, body of the stomach, incisura and antrum; performed cold forceps biopsy to rule out H. pylori  The duodenal bulb and 2nd part of the duodenum appeared normal.    Pathology: 25  A. Stomach, biopsy:  -Gastric antral mucosa with reactive/chemical gastropathy.  -Gastric oxyntic mucosa with no significant histopathologic change.   -Negative for Helicobacter pylori organisms on H&E stain.      B. Esophagogastric junction, mass, biopsy:  -Carcinoma, moderately to poorly differentiated, with glandular, signet ring cell and squamous features.  -MMR has been ordered, results to follow in an addendum.  -HER2 IHC has been ordered, results to follow in an addendum.     Imagin25 CT chest abdomen pelvis w contrast  Mild wall  thickening and hyperenhancement of the distal thoracic esophagus compatible with esophagitis. Ingested material retained within the mid thoracic esophagus. Gastroenterology consult recommended     Labs: N/A    Scheduled appointments:  Future Appointments   Date Time Provider Department [place]   5/22/2025 11:45 AM Mahendra Thrasher, OT BE SLN OT BE [place]   5/23/2025  1:15 PM Gurmeet Peraza DPM PG POD [place] [facility]-Ort   5/28/2025  2:00 PM Mahendra Thrasher OT BE SLN OT BE [place]   5/30/2025 11:00 AM Mahendra Thrasher OT BE SLN OT BE [place]   6/2/2025  8:40 AM Judd Ji MD INT [facility]   7/10/2025 11:15 AM Carroll Mccarthy MD ORTHO ALL Practice-Ort   7/14/2025  1:00 PM AL  2 [facility]   7/21/2025  2:45 PM Shayna Kendall PA-C DIAB CTR [facility] Med Spc   7/22/2025  2:30 PM Adis Hamilton MD CTR UR AL Practice-[place]   8/21/2025  2:30 PM Richard Marinelli MD NEPH HAM ST Med Spc   11/19/2025  1:20 PM Judd Ji MD INT MED [facility]

## 2025-05-22 ENCOUNTER — OFFICE VISIT (OUTPATIENT)
Dept: OCCUPATIONAL THERAPY | Age: 60
End: 2025-05-22
Payer: MEDICARE

## 2025-05-22 ENCOUNTER — RESULTS FOLLOW-UP (OUTPATIENT)
Dept: GASTROENTEROLOGY | Facility: CLINIC | Age: 60
End: 2025-05-22

## 2025-05-22 ENCOUNTER — PREP FOR PROCEDURE (OUTPATIENT)
Dept: GASTROENTEROLOGY | Facility: CLINIC | Age: 60
End: 2025-05-22

## 2025-05-22 ENCOUNTER — TELEPHONE (OUTPATIENT)
Age: 60
End: 2025-05-22

## 2025-05-22 DIAGNOSIS — M77.8 RIGHT WRIST TENDONITIS: ICD-10-CM

## 2025-05-22 DIAGNOSIS — C15.5 MALIGNANT NEOPLASM OF LOWER THIRD OF ESOPHAGUS (HCC): Primary | ICD-10-CM

## 2025-05-22 DIAGNOSIS — M25.641 STIFFNESS OF FINGER JOINT OF RIGHT HAND: ICD-10-CM

## 2025-05-22 DIAGNOSIS — Z47.89 AFTERCARE FOLLOWING SURGERY OF THE MUSCULOSKELETAL SYSTEM: ICD-10-CM

## 2025-05-22 DIAGNOSIS — L08.9 ANIMAL BITE OF LEFT HAND WITH INFECTION, SUBSEQUENT ENCOUNTER: ICD-10-CM

## 2025-05-22 DIAGNOSIS — M65.949 TENOSYNOVITIS OF FINGER AND HAND: Primary | ICD-10-CM

## 2025-05-22 DIAGNOSIS — S61.452D ANIMAL BITE OF LEFT HAND WITH INFECTION, SUBSEQUENT ENCOUNTER: ICD-10-CM

## 2025-05-22 DIAGNOSIS — C15.9 ESOPHAGEAL CARCINOMA (HCC): Primary | ICD-10-CM

## 2025-05-22 PROCEDURE — 97110 THERAPEUTIC EXERCISES: CPT

## 2025-05-22 NOTE — PROGRESS NOTES
Daily Note    Today's date: 2025  Patient name: Yoni Castillo  : 1965  MRN: [id number]  Referring provider: Yesenia Carrasquillo PA-C  Dx:   Encounter Diagnosis     ICD-10-CM    1. Tenosynovitis of finger and hand  M65.949       2. Right wrist tendonitis  M77.8       3. Animal bite of left hand with infection, subsequent encounter  S61.452D     L08.9       4. Aftercare following surgery of the musculoskeletal system  Z47.89       5. Stiffness of finger joint of right hand  M25.641           Start Time: 1100  Stop Time: 1145  Total time in clinic (min): 45 minutes    Subjective: Pt reports no new complaints       Objective: See treatment diary below      Assessment: Tolerated treatment well. Pt continues with improving ability to make a composite fist. He feels his strength is steadily improving. Tolerated progression with exercises today requiring minimal cues for positioning and grading exercises.       Plan: Continue with current plan of care.     Precautions: 3/24 FCR and FCU debridement and tenosynovectomy     Manuals  3/27 5/1 re-eval 5/12 5/15 5/22    Edema mob   10' 10'     Scar mob    5'     STM   5' 5' 5'                        Ther Ex              Forearm AROM 2x20 2x20 2x20 2x20 2x20   Wrist P/AROM 2x20 2x20 2x20 2x20 2x20   Tendon glides 2x20 2x20 2x20 2x20 2x20   Thumb P/AROM X20 all planes X20 all planes X20 all planes X20 all planes    Ball stretch        Strengthening  Yellow putty squeeze/press/twist Green putty press x20 reps 2#    Flex bar twists x20 Green putty press x20 reps 3#    Wrist bar twists x20 Green putty press x20 reps 4#    Wrist bar twists x20    Flex bar                                                               Ther Activity          Grasp/release                                                           Neuro Re-Ed                                   Ortho Fit                                   Modalities         HP   5' 5' 5' 5'

## 2025-05-22 NOTE — TELEPHONE ENCOUNTER
Please assist the patient with a sooner appointment as per the clinical review .  Patient best contact number [phone]

## 2025-05-23 ENCOUNTER — PATIENT OUTREACH (OUTPATIENT)
Dept: HEMATOLOGY ONCOLOGY | Facility: CLINIC | Age: 60
End: 2025-05-23

## 2025-05-23 ENCOUNTER — OFFICE VISIT (OUTPATIENT)
Dept: PODIATRY | Facility: CLINIC | Age: 60
End: 2025-05-23

## 2025-05-23 VITALS — HEIGHT: 72 IN | BODY MASS INDEX: 38.47 KG/M2 | WEIGHT: 284 LBS

## 2025-05-23 DIAGNOSIS — Z89.422 HISTORY OF AMPUTATION OF LESSER TOE, LEFT (HCC): ICD-10-CM

## 2025-05-23 DIAGNOSIS — C16.0 GE JUNCTION CARCINOMA (HCC): ICD-10-CM

## 2025-05-23 DIAGNOSIS — B35.1 ONYCHOMYCOSIS: ICD-10-CM

## 2025-05-23 DIAGNOSIS — C15.9 ESOPHAGEAL CARCINOMA (HCC): Primary | ICD-10-CM

## 2025-05-23 DIAGNOSIS — E11.22 TYPE 2 DIABETES MELLITUS WITH CHRONIC KIDNEY DISEASE, WITHOUT LONG-TERM CURRENT USE OF INSULIN, UNSPECIFIED CKD STAGE (HCC): Primary | ICD-10-CM

## 2025-05-23 NOTE — PROGRESS NOTES
Yoni ERIK [facility]  1965  AT RISK FOOT CARE    1. Type 2 diabetes mellitus with chronic kidney disease, without long-term current use of insulin, unspecified CKD stage (HCC)        2. History of amputation of lesser toe, left (HCC)        3. Onychomycosis            Patient presents for at-risk foot care.  Patient has no acute concerns today.  Patient has significant lower extremity risk due to previous amputation.    On exam patient has thickened, hypertrophic, discolored, brittle toenails with subungual debris and tenderness x1   Callus: none  Amputation: left 5th digit    Today's treatment includes:  Debridement of toenails. Using nail nipper, juliet, and curette, nails were sharply debrided, reduced in thickness and length. Devitalized nail tissue and fungal debris excised and removed. Patient tolerated well.        Discussed proper shoe gear, daily inspections of feet, and general foot health with patient. Patient has Q7  findings and is recommended for at risk foot care every 9-10 weeks.    Patients most recent complete clinical exam was performed: 2/21/2025

## 2025-05-23 NOTE — PROGRESS NOTES
Initial outreach. Spoke to Yoni Castillo. Introduced myself and explained the role of an Oncology Nurse Navigator to assist with coordination of cancer care, preparation for upcoming appointment, be a point of contact prior to oncology consult, provide support and connect him with available resources. Discussed surgical/thoracic, medical, thoracic oncology referral placed by Jackie Lyons PA-C. Explained I will be their main contact until they are established with their team and a treatment plan is established.     Assessed for barriers of care. Patient lives with filorenza who will be going with patient to [facility].  Patient reports he has some experience with cancer step father's family history and worked at [employer].   Patient is very claustrophobic in past had was fully sedation and is concerned about PET. Patient reports that he has done okay with CT scans but for MRIs he was sedated. Included in order regarding claustrophobia.   General assessment completed.    Do you have a history of cancer? No  Have you ever received Radiation Therapy? No  Do you have any implantable devices? No    NVD/constipation: Yes, food that gets stuck can sometimes come back up.  Weight Loss: No  Appetite change: Yes, no appetite at times, doesn't feel like food goes down all the time  Fatigue: No  Pain: No    PCP: Judd Ji MD  Insurance: Medicare/[facility]    Reviewed upcoming appointments including date, time and location.  Future Appointments   Date Time Provider Department Center   5/23/2025  1:15 PM Gurmeet Peraza DPM PG POD [facility]-Ort   5/27/2025  3:40 PM Judd Ji MD [facility]   5/28/2025  2:00 PM Mahendra Thrasher OT BE SLN OT [facility]   5/30/2025 11:00 AM Mahendra Thrasher OT BE SLN OT BE [place]   6/4/2025  2:00 PM hCapis Alarcon MD SURG ONC BE Practice-Onc   6/5/2025  9:40 AM Agustina Kiser MD [facility]   7/10/2025 11:15 AM Carroll Mccarthy MD  ORTHO ALL Practice-Ort   7/14/2025  1:00 PM AL US 2 AL US [facility]   7/21/2025  2:45 PM Shayna Kendall PA-C DIAB CTR [facility] Med Spc   7/22/2025  2:30 PM Adis Hamilton MD CTR UR AL Practice-[place]   8/21/2025  2:30 PM Richard Marinelli MD NEPH HAM ST Med [facility]   11/19/2025  1:20 PM Judd Ji MD INT [facility]     Introduced Paty, and explained she will be his patient navigator, who will reach out after the first consult to introduce themselves. The PN will provide support, make sure he has a good understanding of the plan and schedule and to connect with additional resources if/when needed.     My direct contact information provided. Patient is aware that he can call me should he need any further assistance. Patient was appreciative of assistance.

## 2025-05-27 ENCOUNTER — TELEMEDICINE (OUTPATIENT)
Age: 60
End: 2025-05-27
Payer: MEDICARE

## 2025-05-27 DIAGNOSIS — I10 ESSENTIAL HYPERTENSION: ICD-10-CM

## 2025-05-27 DIAGNOSIS — R13.19 OTHER DYSPHAGIA: ICD-10-CM

## 2025-05-27 DIAGNOSIS — C15.5 MALIGNANT NEOPLASM OF LOWER THIRD OF ESOPHAGUS (HCC): Primary | ICD-10-CM

## 2025-05-27 DIAGNOSIS — E11.22 TYPE 2 DIABETES MELLITUS WITH CHRONIC KIDNEY DISEASE, WITHOUT LONG-TERM CURRENT USE OF INSULIN, UNSPECIFIED CKD STAGE (HCC): ICD-10-CM

## 2025-05-27 DIAGNOSIS — M79.605 LEFT LEG PAIN: ICD-10-CM

## 2025-05-27 PROCEDURE — 99495 TRANSJ CARE MGMT MOD F2F 14D: CPT | Performed by: INTERNAL MEDICINE

## 2025-05-27 RX ORDER — TRAMADOL HYDROCHLORIDE 50 MG/1
50 TABLET ORAL EVERY 8 HOURS PRN
Qty: 100 TABLET | Refills: 0
Start: 2025-05-27

## 2025-05-27 NOTE — ASSESSMENT & PLAN NOTE
Continue current Jardiance medication 25 mg daily and low concentrated sugar and carbohydrate diet  Lab Results   Component Value Date    HGBA1C 10.4 (H) 04/06/2025

## 2025-05-27 NOTE — ASSESSMENT & PLAN NOTE
Dysphagia symptoms found to be likely secondary to malignant growth at the bottom of the esophagus.  Referred on to medical oncology thoracic surgery and surgical oncology services along with PET scan scheduled.  Pain medication ordered as he has been experiencing discomfort at the bottom of his esophagus.  Continue current diabetic medication Jardiance 25 mg daily.  Continue current hypertensive therapy lisinopril hydrochlorothiazide 20-12.5 mg twice a day and carvedilol 25 mg twice a day

## 2025-05-27 NOTE — PROGRESS NOTES
Virtual TCM Visit:Name: Yoni Castillo      : 1965      MRN: [id number]  Encounter Provider: Judd Ji MD  Encounter Date: 2025   Encounter department: [facility] INTERNAL MEDICINE  :  Assessment & Plan  Left leg pain         Malignant neoplasm of lower third of esophagus (HCC)    Orders:    traMADol (Ultram) 50 mg tablet; Take 1 tablet (50 mg total) by mouth every 8 (eight) hours as needed for moderate pain and 2 pills at bedtime at least 6 hours after last dose of the day    Other dysphagia  Dysphagia symptoms found to be likely secondary to malignant growth at the bottom of the esophagus.  Referred on to medical oncology thoracic surgery and surgical oncology services along with PET scan scheduled.  Pain medication ordered as he has been experiencing discomfort at the bottom of his esophagus.  Continue current diabetic medication Jardiance 25 mg daily.  Continue current hypertensive therapy lisinopril hydrochlorothiazide 20-12.5 mg twice a day and carvedilol 25 mg twice a day         Type 2 diabetes mellitus with chronic kidney disease, without long-term current use of insulin, unspecified CKD stage (HCC)  Continue current Jardiance medication 25 mg daily and low concentrated sugar and carbohydrate diet  Lab Results   Component Value Date    HGBA1C 10.4 (H) 2025            Essential hypertension  Continue current hypertensive management         Left leg pain         Malignant neoplasm of lower third of esophagus (HCC)                  History of Present Illness     Transitional Care Management Review:   Yoni Castillo is a 60 y.o. male here for TCM follow up.    During the TCM phone call patient stated:  TCM Call (since 2025)       Date and time call was made  2025  9:28 AM    Hospital care reviewed  Records reviewed    Patient was hospitialized at  [facility]    Date of Admission  25    Date of discharge  25    Diagnosis  Other dysphagia     Disposition  Home          TCM Call (since 5/13/2025)       Scheduled for follow up?  Yes    I have advised the patient to call PCP with any new or worsening symptoms  Angelina Wachter MBryantA    Living Arrangements  Spouse or Significiant other    [place]; [place]; Friends          This 60-year-old gentleman is visited today by virtual visit for a transition of care.  Recently hospitalized at [facility] for difficulty swallowing underwent endoscopy evaluation found to have a significant mass at the bottom of his esophagus which was biopsied.  Biopsies indicate findings consistent with a malignancy possibly squamous cell type.  He has been referred on to medical oncology as well as thoracic surgery and surgical oncology services at [facility] and is already scheduled for a PET scan total-body.  Patient relates that he does have discomfort at the bottom of his esophagus requesting medication to help control this medication discomfort.  We have prescribed tramadol 1 pill in the morning 1 in the afternoon and 2 at bedtime to help control his pain and provide him with relief.  Indicates no major problems with swallowing since returning home but is very cautious about food selection at this time.      Review of Systems   Gastrointestinal:         Dysphagia   All other systems reviewed and are negative.    Objective   There were no vitals taken for this visit.    Physical Exam  Medications have been reviewed by provider in current encounter    Administrative Statements   Encounter provider Judd Ji MD    The Patient is located at Home and in the following state in which I hold an active license [place].    The patient was identified by name and date of birth. Yoni Castillo was informed that this is a telemedicine visit and that the visit is being conducted through the Epic Embedded platform. He agrees to proceed..  My office door was closed. No one else was in the room.  He acknowledged consent and  understanding of privacy and security of the video platform. The patient has agreed to participate and understands they can discontinue the visit at any time.    I have spent a total time of 15 minutes in caring for this patient on the day of the visit/encounter including Diagnostic results, not including the time spent for establishing the audio/video connection.    Judd Ji MD

## 2025-05-28 ENCOUNTER — TELEPHONE (OUTPATIENT)
Dept: GASTROENTEROLOGY | Facility: CLINIC | Age: 60
End: 2025-05-28

## 2025-05-28 ENCOUNTER — APPOINTMENT (OUTPATIENT)
Dept: OCCUPATIONAL THERAPY | Age: 60
End: 2025-05-28
Payer: MEDICARE

## 2025-05-28 NOTE — TELEPHONE ENCOUNTER
----- Message from Evon TODD sent at 5/27/2025  7:51 AM EDT -----  Regarding: FW: EUS    ----- Message -----  From: Marta Oropeza PA-C  Sent: 5/23/2025  10:37 AM EDT  To: [facility]  Subject: FW: EUS                                          Ready to schedule. TY!  ----- Message -----  From: Krystina Diaz MD  Sent: 5/22/2025  10:06 PM EDT  To: Jackelyn Davidson DO; [facility]  Subject: RE: EUS                                          HiThank you for the heads upGI team - can please offer EUS at [facility], first availableThank you  ----- Message -----  From: Jackelyn Davidson DO  Sent: 5/22/2025   4:12 PM EDT  To: Krystina Diaz MD; [facility]  Subject: EUS                                              Good afternoon,Can you please call patient to arrange EUS for staging purposes for recent diagnosis of esophageal cancer? He will need to be done in hospital setting.Dr. Diaz - he prefers [facility] so I included you here. I wanted you to also be aware that he had an issue with obstruction during our EGD and required intubation just as a heads up.Thank you,Kenrick

## 2025-05-28 NOTE — TELEPHONE ENCOUNTER
Procedure: EUS   Scheduled date of procedure (as of today): 6/24/25  Physician performing procedure: Dr. Diaz   Location of procedure: [place]   Instructions reviewed with patient by:  Evon rey   Clearances:  Jardiance - 4 day hold

## 2025-05-30 ENCOUNTER — APPOINTMENT (OUTPATIENT)
Dept: OCCUPATIONAL THERAPY | Age: 60
End: 2025-05-30
Payer: MEDICARE

## 2025-05-30 PROBLEM — C15.9 ESOPHAGEAL CARCINOMA (HCC): Status: ACTIVE | Noted: 2025-05-30

## 2025-06-02 ENCOUNTER — APPOINTMENT (OUTPATIENT)
Dept: OCCUPATIONAL THERAPY | Age: 60
End: 2025-06-02
Payer: MEDICARE

## 2025-06-02 ENCOUNTER — NURSE TRIAGE (OUTPATIENT)
Age: 60
End: 2025-06-02

## 2025-06-02 DIAGNOSIS — I10 ESSENTIAL HYPERTENSION: ICD-10-CM

## 2025-06-02 DIAGNOSIS — G43.709 CHRONIC MIGRAINE WITHOUT AURA WITHOUT STATUS MIGRAINOSUS, NOT INTRACTABLE: ICD-10-CM

## 2025-06-02 NOTE — TELEPHONE ENCOUNTER
Please call the patient make sure he is on his way to the emergency room he has not checked in yet thank you

## 2025-06-02 NOTE — TELEPHONE ENCOUNTER
Called pt to let him know to go to the ER or see if he was on his way there. He let me know he would go to ER

## 2025-06-02 NOTE — TELEPHONE ENCOUNTER
"REASON FOR CONVERSATION: Leg Swelling    SYMPTOMS: Right leg swelling between ankle and knee. Patient said he can't walk without pain. Also, has pain when not walking in certain positions.  Discoloration noted above ankle area that is warm to touch.    OTHER HEALTH INFORMATION: Recent anesthesia  Patient scheduled for PET scan 6/3/25     PROTOCOL DISPOSITION: Go to ED    CARE ADVICE PROVIDED: Go To ED     PRACTICE FOLLOW-UP: patient requesting sedative medication for PET scan tomorrow.    Reason for Disposition   Can't walk or can barely stand (new-onset)    Answer Assessment - Initial Assessment Questions  1. ONSET: \"When did the swelling start?\" (e.g., minutes, hours, days)      Swelling started yesterday-getting worse  2. LOCATION: \"What part of the leg is swollen?\"  \"Are both legs swollen or just one leg?\"      Right leg swelling bellow knee   3. SEVERITY: \"How bad is the swelling?\" (e.g., localized; mild, moderate, severe)      mild  4. REDNESS: \"Does the swelling look red or infected?\"      Discoloration above ankle   5. PAIN: \"Is the swelling painful to touch?\" If Yes, ask: \"How painful is it?\"   (Scale 1-10; mild, moderate or severe)      Moderate - severe when walking   6. FEVER: \"Do you have a fever?\" If Yes, ask: \"What is it, how was it measured, and when did it start?\"       No   7. CAUSE: \"What do you think is causing the leg swelling?\"      Possible cellulitis   8. MEDICAL HISTORY: \"Do you have a history of blood clots (e.g., DVT), cancer, heart failure, kidney disease, or liver failure?\"      Kidney right- kidney stone removal 5-6 weeks ago   9. RECURRENT SYMPTOM: \"Have you had leg swelling before?\" If Yes, ask: \"When was the last time?\" \"What happened that time?\"      Yes-other leg 23 years ago- cellulitis   10. OTHER SYMPTOMS: \"Do you have any other symptoms?\" (e.g., chest pain, difficulty breathing)        no    Protocols used: Leg Swelling and Edema-Adult-[place]    "

## 2025-06-02 NOTE — TELEPHONE ENCOUNTER
Patient called bc he has a pet scan scheduled for tomorrow and he need sedative/anti anxiety medication for it please can someone please follow up with him about this?    Thlovek you

## 2025-06-03 ENCOUNTER — HOSPITAL ENCOUNTER (OUTPATIENT)
Dept: NUCLEAR MEDICINE | Facility: HOSPITAL | Age: 60
Discharge: HOME | End: 2025-06-03
Attending: STUDENT IN AN ORGANIZED HEALTH CARE EDUCATION/TRAINING PROGRAM
Payer: MEDICARE

## 2025-06-03 ENCOUNTER — APPOINTMENT (EMERGENCY)
Dept: NON INVASIVE DIAGNOSTICS | Facility: HOSPITAL | Age: 60
End: 2025-06-03
Payer: MEDICARE

## 2025-06-03 ENCOUNTER — HOSPITAL ENCOUNTER (EMERGENCY)
Facility: HOSPITAL | Age: 60
Discharge: HOME | End: 2025-06-03
Attending: EMERGENCY MEDICINE | Admitting: EMERGENCY MEDICINE
Payer: MEDICARE

## 2025-06-03 VITALS
SYSTOLIC BLOOD PRESSURE: 120 MMHG | WEIGHT: 277.78 LBS | BODY MASS INDEX: 37.67 KG/M2 | HEART RATE: 98 BPM | TEMPERATURE: 98.6 F | DIASTOLIC BLOOD PRESSURE: 87 MMHG | RESPIRATION RATE: 17 BRPM | OXYGEN SATURATION: 95 %

## 2025-06-03 DIAGNOSIS — C16.0 GE JUNCTION CARCINOMA (HCC): ICD-10-CM

## 2025-06-03 DIAGNOSIS — I10 ESSENTIAL HYPERTENSION: ICD-10-CM

## 2025-06-03 DIAGNOSIS — I80.9 SUPERFICIAL THROMBOPHLEBITIS: Primary | ICD-10-CM

## 2025-06-03 DIAGNOSIS — N18.31 STAGE 3A CHRONIC KIDNEY DISEASE (HCC): ICD-10-CM

## 2025-06-03 DIAGNOSIS — C15.9 ESOPHAGEAL CARCINOMA (HCC): ICD-10-CM

## 2025-06-03 DIAGNOSIS — E11.22 TYPE 2 DIABETES MELLITUS WITH CHRONIC KIDNEY DISEASE, WITHOUT LONG-TERM CURRENT USE OF INSULIN, UNSPECIFIED CKD STAGE (HCC): ICD-10-CM

## 2025-06-03 LAB
ANION GAP SERPL CALCULATED.3IONS-SCNC: 11 MMOL/L (ref 4–13)
APTT PPP: 27 SECONDS (ref 23–34)
BASOPHILS # BLD AUTO: 0.03 THOUSANDS/ÂΜL (ref 0–0.1)
BASOPHILS NFR BLD AUTO: 1 % (ref 0–1)
BUN SERPL-MCNC: 33 MG/DL (ref 5–25)
CALCIUM SERPL-MCNC: 9.2 MG/DL (ref 8.4–10.2)
CHLORIDE SERPL-SCNC: 98 MMOL/L (ref 96–108)
CO2 SERPL-SCNC: 24 MMOL/L (ref 21–32)
CREAT SERPL-MCNC: 1.65 MG/DL (ref 0.6–1.3)
EOSINOPHIL # BLD AUTO: 0.11 THOUSAND/ÂΜL (ref 0–0.61)
EOSINOPHIL NFR BLD AUTO: 2 % (ref 0–6)
ERYTHROCYTE [DISTWIDTH] IN BLOOD BY AUTOMATED COUNT: 12.5 % (ref 11.6–15.1)
GFR SERPL CREATININE-BSD FRML MDRD: 43 ML/MIN/1.73SQ M
GLUCOSE SERPL-MCNC: 255 MG/DL (ref 65–140)
GLUCOSE SERPL-MCNC: 268 MG/DL (ref 65–140)
HCT VFR BLD AUTO: 41.5 % (ref 36.5–49.3)
HGB BLD-MCNC: 14.1 G/DL (ref 12–17)
IMM GRANULOCYTES # BLD AUTO: 0.01 THOUSAND/UL (ref 0–0.2)
IMM GRANULOCYTES NFR BLD AUTO: 0 % (ref 0–2)
INR PPP: 1.12 (ref 0.85–1.19)
LYMPHOCYTES # BLD AUTO: 0.69 THOUSANDS/ÂΜL (ref 0.6–4.47)
LYMPHOCYTES NFR BLD AUTO: 14 % (ref 14–44)
MCH RBC QN AUTO: 30.7 PG (ref 26.8–34.3)
MCHC RBC AUTO-ENTMCNC: 34 G/DL (ref 31.4–37.4)
MCV RBC AUTO: 90 FL (ref 82–98)
MONOCYTES # BLD AUTO: 0.56 THOUSAND/ÂΜL (ref 0.17–1.22)
MONOCYTES NFR BLD AUTO: 11 % (ref 4–12)
NEUTROPHILS # BLD AUTO: 3.5 THOUSANDS/ÂΜL (ref 1.85–7.62)
NEUTS SEG NFR BLD AUTO: 72 % (ref 43–75)
NRBC BLD AUTO-RTO: 0 /100 WBCS
PLATELET # BLD AUTO: 219 THOUSANDS/UL (ref 149–390)
PMV BLD AUTO: 10.1 FL (ref 8.9–12.7)
POTASSIUM SERPL-SCNC: 4.6 MMOL/L (ref 3.5–5.3)
PROTHROMBIN TIME: 14.6 SECONDS (ref 12.3–15)
RBC # BLD AUTO: 4.59 MILLION/UL (ref 3.88–5.62)
SODIUM SERPL-SCNC: 133 MMOL/L (ref 135–147)
WBC # BLD AUTO: 4.9 THOUSAND/UL (ref 4.31–10.16)

## 2025-06-03 PROCEDURE — 99284 EMERGENCY DEPT VISIT MOD MDM: CPT

## 2025-06-03 PROCEDURE — 96365 THER/PROPH/DIAG IV INF INIT: CPT

## 2025-06-03 PROCEDURE — 85610 PROTHROMBIN TIME: CPT | Performed by: EMERGENCY MEDICINE

## 2025-06-03 PROCEDURE — 99285 EMERGENCY DEPT VISIT HI MDM: CPT | Performed by: EMERGENCY MEDICINE

## 2025-06-03 PROCEDURE — 82948 REAGENT STRIP/BLOOD GLUCOSE: CPT

## 2025-06-03 PROCEDURE — 80048 BASIC METABOLIC PNL TOTAL CA: CPT | Performed by: EMERGENCY MEDICINE

## 2025-06-03 PROCEDURE — 85025 COMPLETE CBC W/AUTO DIFF WBC: CPT | Performed by: EMERGENCY MEDICINE

## 2025-06-03 PROCEDURE — 93971 EXTREMITY STUDY: CPT

## 2025-06-03 PROCEDURE — 93971 EXTREMITY STUDY: CPT | Performed by: SURGERY

## 2025-06-03 PROCEDURE — 85730 THROMBOPLASTIN TIME PARTIAL: CPT | Performed by: EMERGENCY MEDICINE

## 2025-06-03 PROCEDURE — 36415 COLL VENOUS BLD VENIPUNCTURE: CPT | Performed by: EMERGENCY MEDICINE

## 2025-06-03 RX ORDER — LISINOPRIL AND HYDROCHLOROTHIAZIDE 12.5; 2 MG/1; MG/1
1 TABLET ORAL 2 TIMES DAILY
Qty: 200 TABLET | Refills: 1 | Status: SHIPPED | OUTPATIENT
Start: 2025-06-03

## 2025-06-03 RX ORDER — CARVEDILOL 25 MG/1
25 TABLET ORAL 2 TIMES DAILY WITH MEALS
Qty: 200 TABLET | Refills: 1 | Status: SHIPPED | OUTPATIENT
Start: 2025-06-03

## 2025-06-03 RX ORDER — ACETAMINOPHEN 10 MG/ML
1000 INJECTION, SOLUTION INTRAVENOUS ONCE
Status: COMPLETED | OUTPATIENT
Start: 2025-06-03 | End: 2025-06-03

## 2025-06-03 RX ORDER — AMITRIPTYLINE HYDROCHLORIDE 10 MG/1
TABLET ORAL
Qty: 90 TABLET | Refills: 5 | Status: SHIPPED | OUTPATIENT
Start: 2025-06-03

## 2025-06-03 RX ORDER — RIVAROXABAN 15 MG-20MG
KIT ORAL
Qty: 51 EACH | Refills: 0 | Status: SHIPPED | OUTPATIENT
Start: 2025-06-03

## 2025-06-03 RX ORDER — LIDOCAINE 40 MG/G
CREAM TOPICAL AS NEEDED
Qty: 30 G | Refills: 0 | Status: SHIPPED | OUTPATIENT
Start: 2025-06-03

## 2025-06-03 RX ADMIN — ACETAMINOPHEN 1000 MG: 10 INJECTION INTRAVENOUS at 15:10

## 2025-06-03 NOTE — ED PROVIDER NOTES
Time reflects when diagnosis was documented in both MDM as applicable and the Disposition within this note       Time User Action Codes Description Comment    6/3/2025  3:59 PM Matt Sorenson Add [I80.9] Superficial thrombophlebitis     6/3/2025  4:01 PM Matt Sorenson Add [I10] Essential hypertension     6/3/2025  4:01 PM Matt Sorenson Add [N18.31] Stage 3a chronic kidney disease (HCC)     6/3/2025  4:01 PM Matt Sorenson Add [E11.22] Type 2 diabetes mellitus with chronic kidney disease, without long-term current use of insulin, unspecified CKD stage (HCC)           ED Disposition       ED Disposition   Discharge    Condition   Stable    Date/Time   Tue Stephane 3, 2025  3:59 PM    Comment   Yoni Castillo discharge to home/self care.                   Assessment & Plan       Medical Decision Making  Right lower extremity pain-will do septic workup to rule out cellulitis, venous duplex to rule out DVT, superficial thrombophlebitis.  Doubt abscess, necrotizing fasciitis.  Will treat symptoms.    Amount and/or Complexity of Data Reviewed  Labs: ordered. Decision-making details documented in ED Course.  Radiology:  Decision-making details documented in ED Course.    Risk  OTC drugs.  Prescription drug management.        ED Course as of 06/03/25 1643   Tue Jun 03, 2025   1530 VAS lower limb venous duplex study, unilateral/limited  Superficial thrombophlebitis extending to the common femoral vein but does not involve the common femoral vein   1531 Will consult vascular surgery     1543 Enrico rodriguez PA-C, will tx with oral anticoagulation for the 3months, vascular follow up.    1543 Creatinine(!): 1.65   1543 GLUCOSE(!): 268  Not in dka         Medications   acetaminophen (Ofirmev) injection 1,000 mg (0 mg Intravenous Stopped 6/3/25 1530)       ED Risk Strat Scores                    No data recorded        SBIRT 20yo+      Flowsheet Row Most Recent Value   Initial Alcohol Screen: US AUDIT-C     1. How often do you have a drink  containing alcohol? 0 Filed at: 06/03/2025 1420   2. How many drinks containing alcohol do you have on a typical day you are drinking?  0 Filed at: 06/03/2025 1420   3a. Male UNDER 65: How often do you have five or more drinks on one occasion? 0 Filed at: 06/03/2025 1420   Audit-C Score 0 Filed at: 06/03/2025 1420   GARCÍA: How many times in the past year have you...    Used an illegal drug or used a prescription medication for non-medical reasons? Never Filed at: 06/03/2025 1420                            History of Present Illness       Chief Complaint   Patient presents with    Leg Pain     Pt reports R upper leg pain x1 week. Reports redness on ankle. Hx of cellulitis in L leg.        Past Medical History[1]   Past Surgical History[2]   Family History[3]   Social History[4]   E-Cigarette/Vaping    E-Cigarette Use Never User       E-Cigarette/Vaping Substances    Nicotine No     THC No     CBD No     Flavoring No     Other No     Unknown No       I have reviewed and agree with the history as documented.     58-year-old male presents for evaluation of right leg pain.  Patient complains of pain that started the medial aspect of his right calf with associated erythema.  He states that is extended up past the knee into towards his groin.  The pain is constant, worse with standing, moving his muscles.  Patient denies systemic complaints.      History provided by:  Patient  Leg Pain  Associated symptoms: no fatigue and no fever        Review of Systems   Constitutional:  Negative for activity change, appetite change, fatigue and fever.   HENT:  Negative for congestion, dental problem, ear pain, rhinorrhea and sore throat.    Eyes:  Negative for pain and redness.   Respiratory:  Negative for chest tightness, shortness of breath and wheezing.    Cardiovascular:  Positive for leg swelling. Negative for chest pain and palpitations.   Gastrointestinal:  Negative for abdominal pain, blood in stool, constipation, diarrhea,  nausea and vomiting.   Endocrine: Negative for cold intolerance and heat intolerance.   Genitourinary:  Negative for dysuria, frequency and hematuria.   Musculoskeletal:  Negative for arthralgias and myalgias.   Skin:  Negative for color change, pallor and rash.   Neurological:  Negative for weakness and numbness.   Hematological:  Does not bruise/bleed easily.   Psychiatric/Behavioral:  Negative for agitation, hallucinations and suicidal ideas.            Objective       ED Triage Vitals [06/03/25 1418]   Temperature Pulse Blood Pressure Respirations SpO2 Patient Position - Orthostatic VS   98.6 °F (37 °C) 98 120/87 17 95 % Sitting      Temp Source Heart Rate Source BP Location FiO2 (%) Pain Score    Oral Monitor Right arm -- --      Vitals      Date and Time Temp Pulse SpO2 Resp BP Pain Score FACES Pain Rating User   06/03/25 1418 98.6 °F (37 °C) 98 95 % 17 120/87 -- -- AA            Physical Exam  Constitutional:       Appearance: He is well-developed.   HENT:      Head: Normocephalic and atraumatic.     Eyes:      General: No scleral icterus.     Conjunctiva/sclera: Conjunctivae normal.     Neck:      Vascular: No JVD.      Trachea: No tracheal deviation.   Pulmonary:      Effort: Pulmonary effort is normal. No respiratory distress.   Abdominal:      General: There is no distension.     Musculoskeletal:         General: No deformity. Normal range of motion.      Cervical back: Normal range of motion.      Comments: R hip/knee/ankle exam wnl, erythema warmth palpable cord R calf, nvi rle     Skin:     Coloration: Skin is not pale.     Neurological:      Mental Status: He is alert and oriented to person, place, and time.     Psychiatric:         Behavior: Behavior normal.         Results Reviewed       Procedure Component Value Units Date/Time    Basic metabolic panel [[id number]]  (Abnormal) Collected: 06/03/25 1509    Lab Status: Final result Specimen: Blood from Arm, Left Updated: 06/03/25 1539     Sodium 133  mmol/L      Potassium 4.6 mmol/L      Chloride 98 mmol/L      CO2 24 mmol/L      ANION GAP 11 mmol/L      BUN 33 mg/dL      Creatinine 1.65 mg/dL      Glucose 268 mg/dL      Calcium 9.2 mg/dL      eGFR 43 ml/min/1.73sq m     Narrative:      National Kid[employer] guidelines for Chronic Kidney Disease (CKD):     Stage 1 with normal or high GFR (GFR > 90 mL/min/1.73 square meters)    Stage 2 Mild CKD (GFR = 60-89 mL/min/1.73 square meters)    Stage 3A Moderate CKD (GFR = 45-59 mL/min/1.73 square meters)    Stage 3B Moderate CKD (GFR = 30-44 mL/min/1.73 square meters)    Stage 4 Severe CKD (GFR = 15-29 mL/min/1.73 square meters)    Stage 5 End Stage CKD (GFR <15 mL/min/1.73 square meters)  Note: GFR calculation is accurate only with a steady state creatinine    Protime-INR [[id number]]  (Normal) Collected: 06/03/25 1509    Lab Status: Final result Specimen: Blood from Arm, Left Updated: 06/03/25 1532     Protime 14.6 seconds      INR 1.12    Narrative:      INR Therapeutic Range    Indication                                             INR Range      Atrial Fibrillation                                               2.0-3.0  Hypercoagulable State                                    2.0.2.3  Left Ventricular Asist Device                            2.0-3.0  Mechanical Heart Valve                                  -    Aortic(with afib, MI, embolism, HF, LA enlargement,    and/or coagulopathy)                                     2.0-3.0 (2.5-3.5)     Mitral                                                             2.5-3.5  Prosthetic/Bioprosthetic Heart Valve               2.0-3.0  Venous thromboembolism (VTE: VT, PE        2.0-3.0    APTT [[id number]]  (Normal) Collected: 06/03/25 1509    Lab Status: Final result Specimen: Blood from Arm, Left Updated: 06/03/25 1532     PTT 27 seconds     CBC and differential [[id number]] Collected: 06/03/25 1509    Lab Status: Final result Specimen: Blood from Arm, Left Updated:  25 1519     WBC 4.90 Thousand/uL      RBC 4.59 Million/uL      Hemoglobin 14.1 g/dL      Hematocrit 41.5 %      MCV 90 fL      MCH 30.7 pg      MCHC 34.0 g/dL      RDW 12.5 %      MPV 10.1 fL      Platelets 219 Thousands/uL      nRBC 0 /100 WBCs      Segmented % 72 %      Immature Grans % 0 %      Lymphocytes % 14 %      Monocytes % 11 %      Eosinophils Relative 2 %      Basophils Relative 1 %      Absolute Neutrophils 3.50 Thousands/µL      Absolute Immature Grans 0.01 Thousand/uL      Absolute Lymphocytes 0.69 Thousands/µL      Absolute Monocytes 0.56 Thousand/µL      Eosinophils Absolute 0.11 Thousand/µL      Basophils Absolute 0.03 Thousands/µL             VAS lower limb venous duplex study, unilateral/limited    (Results Pending)       Procedures    ED Medication and Procedure Management   Prior to Admission Medications   Prescriptions Last Dose Informant Patient Reported? Taking?   Accu-Chek FastClix Lancets MISC  Self No No   Sig: Use to test blood sugar once a day   Patient not taking: Reported on 2025   Blood Glucose Monitoring Suppl (Accu-Chek Guide Me) w/Device KIT  Self No No   Sig: Use to check blood sugar once a day   Patient not taking: Reported on 2025   Empagliflozin (Jardiance) 25 MG TABS   No Yes   Sig: Take 1 tablet (25 mg total) by mouth daily   Insulin Pen Needle (BD Pen Needle [place] U/F) 32G X 4 MM MISC   No No   Sig: Use in the morning   Patient not taking: Reported on 2025   Ubrogepant (UBRELVY) 100 MG tablet  Self No Yes   Sig: Take 1 tablet (100 mg) one time as needed for migraine. May repeat one additional tablet (100 mg) at least two hours after the first dose. Do not use more than two doses per day, or for more than twelve days per month.   acetaminophen (TYLENOL) 500 mg tablet Not Taking  No No   Sig: Take 2 tablets (1,000 mg total) by mouth every 8 (eight) hours   Patient not taking: Reported on 6/3/2025   amitriptyline (ELAVIL) 10 mg tablet   No Yes   Simg at  bedtime   ammonium lactate (LAC-HYDRIN) 12 % lotion  Self Yes Yes   Sig: as needed in the morning and as needed in the evening.   carvedilol (COREG) 25 mg tablet   No Yes   Sig: Take 1 tablet (25 mg total) by mouth 2 (two) times a day with meals   glucose blood (Accu-Chek Guide) test strip  Self No No   Sig: Use to check blood sugar once a day   hydroCHLOROthiazide 12.5 mg tablet   No Yes   Sig: Take 1 tablet (12.5 mg total) by mouth 2 (two) times a day   ketoconazole (NIZORAL) 2 % cream  Self Yes Yes   Sig: if needed   lidocaine (Lidoderm) 5 %   No Yes   Sig: Apply 1 patch topically over 12 hours daily Remove & Discard patch within 12 hours or as directed by MD   lisinopril-hydrochlorothiazide (PRINZIDE,ZESTORETIC) 20-12.5 MG per tablet   No Yes   Sig: Take 1 tablet by mouth in the morning and 1 tablet before bedtime.   methocarbamol (ROBAXIN) 750 mg tablet   No Yes   Sig: Take 2 tablets (1,500 mg total) by mouth every 8 (eight) hours   pantoprazole (PROTONIX) 40 mg tablet   No Yes   Sig: Take 1 tablet (40 mg total) by mouth 2 (two) times a day before meals   traMADol (Ultram) 50 mg tablet   No Yes   Sig: Take 1 tablet (50 mg total) by mouth every 8 (eight) hours as needed for moderate pain and 2 pills at bedtime at least 6 hours after last dose of the day      Facility-Administered Medications: None     Discharge Medication List as of 6/3/2025  4:03 PM        START taking these medications    Details   lidocaine (LMX) 4 % cream Apply topically as needed for moderate pain, Starting Tue 6/3/2025, Normal      rivaroxaban (Xarelto Starter Pack) 15 & 20 MG starter pack Take 15 mg by mouth twice daily for 21 days, then 20 mg once daily thereafter., Normal           CONTINUE these medications which have NOT CHANGED    Details   amitriptyline (ELAVIL) 10 mg tablet 30mg at bedtime, Normal      ammonium lactate (LAC-HYDRIN) 12 % lotion as needed in the morning and as needed in the evening., Starting u 1/25/2024,  Historical Med      carvedilol (COREG) 25 mg tablet Take 1 tablet (25 mg total) by mouth 2 (two) times a day with meals, Starting Tue 6/3/2025, Normal      Empagliflozin (Jardiance) 25 MG TABS Take 1 tablet (25 mg total) by mouth daily, Starting Mon 2/24/2025, Normal      hydroCHLOROthiazide 12.5 mg tablet Take 1 tablet (12.5 mg total) by mouth 2 (two) times a day, Starting Tue 5/20/2025, Normal      ketoconazole (NIZORAL) 2 % cream if needed, Starting Thu 1/25/2024, Historical Med      lidocaine (Lidoderm) 5 % Apply 1 patch topically over 12 hours daily Remove & Discard patch within 12 hours or as directed by MD, Starting Tue 2/25/2025, Normal      lisinopril-hydrochlorothiazide (PRINZIDE,ZESTORETIC) 20-12.5 MG per tablet Take 1 tablet by mouth in the morning and 1 tablet before bedtime., Starting Tue 6/3/2025, Normal      methocarbamol (ROBAXIN) 750 mg tablet Take 2 tablets (1,500 mg total) by mouth every 8 (eight) hours, Starting Tue 5/13/2025, Normal      pantoprazole (PROTONIX) 40 mg tablet Take 1 tablet (40 mg total) by mouth 2 (two) times a day before meals, Starting Tue 5/20/2025, Normal      traMADol (Ultram) 50 mg tablet Take 1 tablet (50 mg total) by mouth every 8 (eight) hours as needed for moderate pain and 2 pills at bedtime at least 6 hours after last dose of the day, Starting Tue 5/27/2025, No Print      Ubrogepant (UBRELVY) 100 MG tablet Take 1 tablet (100 mg) one time as needed for migraine. May repeat one additional tablet (100 mg) at least two hours after the first dose. Do not use more than two doses per day, or for more than twelve days per month., Normal      Accu-Chek FastClix Lancets MISC Use to test blood sugar once a day, Normal      acetaminophen (TYLENOL) 500 mg tablet Take 2 tablets (1,000 mg total) by mouth every 8 (eight) hours, Starting Mon 3/24/2025, Normal      Blood Glucose Monitoring Suppl (Accu-Chek Guide Me) w/Device KIT Use to check blood sugar once a day, Normal      glucose  blood (Accu-Chek Guide) test strip Use to check blood sugar once a day, Normal      Insulin Pen Needle (BD Pen Needle [place] U/F) 32G X 4 MM MISC Use in the morning, Starting Mon 4/7/2025, Normal             ED SEPSIS DOCUMENTATION   Time reflects when diagnosis was documented in both MDM as applicable and the Disposition within this note       Time User Action Codes Description Comment    6/3/2025  3:59 PM Matt Sorenson Add [I80.9] Superficial thrombophlebitis     6/3/2025  4:01 PM Matt Sorenson Add [I10] Essential hypertension     6/3/2025  4:01 PM Matt Sorenson Add [N18.31] Stage 3a chronic kidney disease (HCC)     6/3/2025  4:01 PM Matt Sorenson Add [E11.22] Type 2 diabetes mellitus with chronic kidney disease, without long-term current use of insulin, unspecified CKD stage (HCC)                    [1]   Past Medical History:  Diagnosis Date    Allergic 1968    Seasonal    Arthritis 2012    Brain concussion Multiple    Cervical disc disease     Chronic kidney disease 2012    Chronic pain disorder     CTS (carpal tunnel syndrome) 2002    Diabetes mellitus (HCC)     Full dentures     only using upper    Head injury 1975    Headache(784.0) 1980    High blood sugar     Hypertension     Kidney stone     Liver disease     Low back pain     Cannot remember    Lumbar disc disease     Lumbosacral disc disease     Cannot remember    Migraines     Neuropathy in diabetes (HCC)     RLS (restless legs syndrome)     Skin cancer     in past    Thoracic disc disorder     Cannot remember    Visual impairment 2017    Cataracts. Surgery to correct in 2021   [2]   Past Surgical History:  Procedure Laterality Date    AMPUTATION  2022    Little toe left    CARPAL TUNNEL RELEASE  2002    CATARACT EXTRACTION Bilateral     COLONOSCOPY      CONTRACTURE Right 01/20/2025    Procedure: Right wrist and hand extensor tenolysis and wrist capsulotomy;  Surgeon: Carroll Mccarthy MD;  Location: [facility];  Service: Orthopedics    EYE SURGERY   2021    FOOT SURGERY  2022    Left Foot    FRACTURE SURGERY  1968    Right Tib/Fib    GANGLION CYST EXCISION Left 03/25/2024    Procedure: EXCISION MUCOID CYST, INDEX FINGER DIP;  Surgeon: Carroll Mccarthy MD;  [facility];  Service: Orthopedics    GANGLION CYST EXCISION Right 05/20/2024    Procedure: EXCISION MUCOID CYST - RIGHT INDEX AND MIDDLE FINGERS;  Surgeon: Carroll Mccarthy MD;  [facility];  Service: Orthopedics    HERNIA REPAIR      INCISION AND DRAINAGE  01/16/2024    IR NEPHROURETERAL ACCESS FOR UROLOGY PCNL  04/07/2025    KIDNEY SURGERY Right 06/2012    KNEE SURGERY Right 1980    MOUTH SURGERY      WI AMPUTATION METATARSAL W/TOE SINGLE Left 6/1/2022    Procedure: RAY RESECTION FOOT;  Surgeon: Hannah Melgoza DPM;  [facility];  Service: Podiatry    WI INCISION & DRAINAGE ABSCESS COMPLICATED/MULTIPLE Right 09/17/2024    Procedure: Irrigation and debridement right wrist and hand, any indicated procedures;  Surgeon: Carroll Mccarthy MD;  [facility];  Service: Orthopedics    WI INCISION EXTENSOR TENDON SHEATH WRIST Right 01/20/2025    Procedure: RELEASE DEQUERVAINS - Right;  Surgeon: Carroll Mccarthy MD;  [facility];  Service: Orthopedics    WI PERQ NL/PL LITHOTRP COMPLEX >2 CM MLT LOCATIONS Right 04/11/2025    Procedure: NEPHROLITHOTOMY  PERCUTANEOUS (PCNL);  Surgeon: Adis Hamilton MD;  [facility];  Service: Urology    WI RPR AA HERNIA 1ST < 3 CM REDUCIBLE N/A 7/14/2023    Procedure: REPAIR HERNIA INCISIONAL;  Surgeon: Matt Melo MD;  [facility];  Service: General    WI SYNOVECTOMY EXTENSOR TENDON SHTH WRIST 1 CMPRT Right 10/03/2024    Procedure: Irrigation and debridement, right wrist, volar and dorsal, possible extensor and flexor tenosynovectomy, any indicated procedures;  Surgeon: Carroll Mccarthy MD;  [facility];  Service: Orthopedics    WI SYNOVECTOMY EXTENSOR TENDON SHTH WRIST 1  CMPRT Right 03/24/2025    Procedure: SYNOVECTOMY / DEBRIDEMENT - FLEXOR CARPI RADIALIS AND FLEXOR CARPI ULNARIS TENDONS- RIGHT;  Surgeon: Carroll Mccarthy MD;  [facility];  Service: Orthopedics    TONSILLECTOMY  1968    Yes    WOUND DEBRIDEMENT Left 4/28/2022    Procedure: Left foot washout;  Surgeon: Hannah Melgoza DPM;  [facility];  Service: Podiatry    WOUND DEBRIDEMENT Left 6/6/2022    Procedure: DEBRIDEMENT WOUND (WASH OUT);  Surgeon: Hannah Melgoza DPM;  [facility];  Service: Podiatry   [3]   Family History  Problem Relation Name Age of Onset    Diabetes Paternal Grandmother Michelle     Diabetes Paternal Grandfather Maurice     Arthritis Maternal Grandmother Greatgrandmother  Corie    [4]   Social History  Tobacco Use    Smoking status: Never    Smokeless tobacco: Never   Vaping Use    Vaping status: Never Used   Substance Use Topics    Alcohol use: Yes     Alcohol/week: 1.0 standard drink of alcohol     Types: 1 Cans of beer per week     Comment: ocassional    Drug use: Never        Matt Sorenson MD  06/03/25 4779

## 2025-06-04 ENCOUNTER — CONSULT (OUTPATIENT)
Dept: SURGICAL ONCOLOGY | Facility: CLINIC | Age: 60
End: 2025-06-04
Attending: PHYSICIAN ASSISTANT
Payer: MEDICARE

## 2025-06-04 ENCOUNTER — PATIENT OUTREACH (OUTPATIENT)
Dept: HEMATOLOGY ONCOLOGY | Facility: CLINIC | Age: 60
End: 2025-06-04

## 2025-06-04 VITALS
TEMPERATURE: 97.9 F | SYSTOLIC BLOOD PRESSURE: 128 MMHG | RESPIRATION RATE: 16 BRPM | HEART RATE: 88 BPM | WEIGHT: 280 LBS | DIASTOLIC BLOOD PRESSURE: 74 MMHG | BODY MASS INDEX: 37.93 KG/M2 | OXYGEN SATURATION: 96 % | HEIGHT: 72 IN

## 2025-06-04 DIAGNOSIS — C15.9 ESOPHAGEAL CARCINOMA (HCC): Primary | ICD-10-CM

## 2025-06-04 DIAGNOSIS — E11.22 TYPE 2 DIABETES MELLITUS WITH CHRONIC KIDNEY DISEASE, WITHOUT LONG-TERM CURRENT USE OF INSULIN, UNSPECIFIED CKD STAGE (HCC): ICD-10-CM

## 2025-06-04 PROCEDURE — 99205 OFFICE O/P NEW HI 60 MIN: CPT | Performed by: STUDENT IN AN ORGANIZED HEALTH CARE EDUCATION/TRAINING PROGRAM

## 2025-06-04 NOTE — PROGRESS NOTES
NN outgoing call to patient to discuss PET scheduling. Patient was scheduled for PET on 6/6 at 11am at [facility].   Patient reports he will be canceling OT that he initially had that day at 1115.   Patient was appreciative of my assistance.

## 2025-06-04 NOTE — PROGRESS NOTES
SURGICAL ONCOLOGY CONSULT    Yoni VALENCIA [facility]  1965  [id number]  [facility] [address] [id number] [phone]      ASSESSMENT AND PLAN    1. Esophageal carcinoma (HCC)  Assessment & Plan:  I discussed the information we have thus far, and the likelihood that the patient has a intermediate thickness esophageal cancer.  I discussed that based on his symptoms, it is unlikely that he has a very early stage cancer for which upfront surgery would be offered.  He has an upcoming EUS as well as PET to complete staging.  We discussed that most likely his treatment will be initiated with chemotherapy, with or without radiation, and after a period of treatment he will be reimaged to determine if it is appropriate to move forward with surgery.  We discussed that surgery for esophagus cancer is a major surgery with a relatively high complication rate and that he will need to be in the best shape possible to undergo surgery.  We discussed that to that end, maintaining his nutrition as well as controlling his blood sugar will be very important.  The patient is not interested in meeting with oncology nutrition at this time. He expresses understanding of our discussion.  I will see him again following induction therapy.  All questions answered today of the patient and his wife.  2. Type 2 diabetes mellitus with chronic kidney disease, without long-term current use of insulin, unspecified CKD stage (HCC)  Assessment & Plan:    Lab Results   Component Value Date    HGBA1C 10.4 (H) 04/06/2025     Elevated BG will increase risks around surgery; this was discussed with pt but he is not interested in initiating insulin and will instead utilize natural methods to control his BG        NEW VISIT DATA    Oncology History   Esophageal carcinoma (HCC)   5/19/2025 Biopsy    B. Esophagogastric junction, mass, biopsy:  -Carcinoma, moderately to poorly  differentiated, with glandular, signet ring cell and squamous features.       5/30/2025 Initial Diagnosis    Esophageal carcinoma (HCC)         History of Present Illness:   61 yo with new diagnosis of esophageal cancer. Pt c/o progressive trouble swallowing for the last 4 or so years. He experienced occasional pain and gagging which progressed to difficulty swallowing. He was admitted to the hospital with this complaint, and he had a CT scan that demonstrated eso dilation and a possible mass at the distal third of the esophagus. He then underwent EGD, demonstrating an ulcerated mass extending from 37cm to 40cm in the esophagus covering at least 50% of the circumference, arising in what appears to be Butler's esophagus extending from 36cm to GEJ at 40cm.  The patient denies any previous EGDs or issues with reflux.  He states that he is feeling relatively well.  He is maintaining his weight for the most part, but has lost about 10 pounds.  He is pleased with the weight loss as he is overweight.  He is currently consuming smoothies and other soft foods.  He is on several antihypertensives, but has no cardiopulmonary history.  His blood glucose has been fluctuating for some time.  It was suggested that he start insulin by endocrine, however, he does not wish to start insulin as he does not want his body to become dependent on it.  He states that he has previously used a set of vitamins that were able to control his glucose.  He is an ECOG of 0.    Review of Systems  Complete ROS Surg Onc:   Constitutional: The patient denies new or recent history of general fatigue, + recent weight loss, no change in appetite.   Eyes: No complaints of visual problems, no scleral icterus.   ENT: no complaints of ear pain, no hoarseness, no difficulty swallowing, no tinnitus and no new masses in head, oral cavity, or neck.   Cardiovascular: No complaints of chest pain, no palpitations, no ankle edema.   Respiratory: No complaints of  shortness of breath, no cough.   Gastrointestinal: No complaints of jaundice, no bloody stools, no pale stools.   Genitourinary: No complaints of dysuria, no hematuria, no nocturia, no frequent urination, no urethral discharge.   Musculoskeletal: No complaints of weakness, paralysis, joint stiffness or arthralgias.  Integumentary: No complaints of rash, no new lesions.   Neurological: No complaints of convulsions, no seizures, no dizziness.   Hematologic/Lymphatic: No complaints of easy bruising.   Endocrine:  No hot or cold intolerance.  No polydipsia, polyphagia, or polyuria.  Allergy/immunology:  No environmental allergies.  No food allergies.  Not immunocompromised.  Skin:  No pallor or rash.  No wound.      Problem List[1]  Past Medical History[2]  Past Surgical History[3]  Family History[4]  Social History     Socioeconomic History    Marital status: Registered Domestic Partner     Spouse name: Not on file    Number of children: Not on file    Years of education: Not on file    Highest education level: Not on file   Occupational History    Not on file   Tobacco Use    Smoking status: Never    Smokeless tobacco: Never   Vaping Use    Vaping status: Never Used   Substance and Sexual Activity    Alcohol use: Yes     Alcohol/week: 0.0 - 1.0 standard drinks of alcohol     Comment: ocassional    Drug use: Never    Sexual activity: Not Currently     Partners: Female     Birth control/protection: Abstinence   Other Topics Concern    Not on file   Social History Narrative    Not on file     Social Drivers of Health     Financial Resource Strain: Not on file   Food Insecurity: Patient Declined (5/17/2025)    Nursing - Inadequate Food Risk Classification     Worried About Running Out of Food in the Last Year: Patient declined     Ran Out of Food in the Last Year: Patient declined     Ran Out of Food in the Last Year: Often true   Transportation Needs: No Transportation Needs (5/17/2025)    Nursing - Transportation Risk  Classification     Lack of Transportation: Not on file     Lack of Transportation: No   Physical Activity: Not on file   Stress: Not on file   Social Connections: Not on file   Intimate Partner Violence: Unknown (2025)    Nursing IPS     Feels Physically and Emotionally Safe: Not on file     Physically Hurt by Someone: Not on file     Humiliated or Emotionally Abused by Someone: Not on file     Physically Hurt by Someone: No     Hurt or Threatened by Someone: No   Housing Stability: Unknown (2025)    Nursing: Inadequate Housing Risk Classification     Has Housing: Not on file     Worried About Losing Housing: Not on file     Unable to Get Utilities: Not on file     Unable to Pay for Housing in the Last Year: No     Has Housin     Current Medications[5]  Allergies   Allergen Reactions    Cephalexin Hives     Skin peeling  Tolerates penicillins (tolerated unasyn and zosyn)    Metformin GI Intolerance    Pollen Extract Sneezing         Vitals:    25 1411   BP: 128/74   Pulse: 88   Resp: 16   Temp: 97.9 °F (36.6 °C)   SpO2: 96%       Physical Exam   Constitutional: General appearance: The Patient is well-developed and well-nourished who appears the stated age in no acute distress. Patient is pleasant and talkative.     HEENT:  Normocephalic.  Sclerae are anicteric. Mucous membranes are moist. Neck is supple without adenopathy. No JVD.     Chest: Easy WOB.     Cardiac: Heart is regular rate.     Abdomen: Abdomen is soft, non-tender, non-distended and without masses.     Extremities: There is no clubbing or cyanosis. There is no edema.  Symmetric.  Neuro: Grossly nonfocal. Gait is normal.     Lymphatic: No evidence of cervical adenopathy bilaterally.   Skin: Warm, anicteric.    Psych:  Patient is pleasant and talkative.      Imaging  VAS lower limb venous duplex study, unilateral/limited  Result Date: 6/3/2025  Narrative: [facility] . LISANDRAUMESH KEVIN is a 60 year old M who was referred by  GLEN SORENSON.    Indications: Patient presents with right lower extremity pain x 3 days. Risk Factors: The patient reports hypertension and diabetes.    FINDINGS: Main                                            Venous Thrombus Right Superficial Veins                                      Right Great Saphenous - Inguinal      Acute - Occlusive    Right Great Saphenous - Prox Thigh    Acute - Occlusive    Right Great Saphenous - Mid Thigh     Acute - Occlusive    Right Great Saphenous - Dist Thigh    Acute - Occlusive    Right Great Saphenous - Knee          Acute - Occlusive    Right Great Saphenous - Prox Calf     Acute - Occlusive    Right Great Saphenous - Mid Calf      Acute - Occlusive    Right Great Saphenous - Dist Calf     Acute - Occlusive CONCLUSION:  RIGHT LOWER LIMB Evaluation shows acute occlusive thrombophlebitis in the greater saphenous vein from the sapheno femoro junction to the ankle. There is chronic thrombophlebitis noted short saphenous vein.  There is no evidence of acute or chronic deep vein thrombosis. Doppler evaluation shows a normal response to augmentation maneuvers. Popliteal, posterior tibial and anterior tibial arterial Doppler waveforms are triphasic.   LEFT LOWER LIMB LIMITED There is no evidence of thrombus in the common femoral vein.  Doppler evaluation shows a normal response to augmentation maneuvers.    Technical findings were given to Glen Sorenson MD.   SIGNATURE Signed by MERT CROW MD, RPVI on 2025-06-03 17:26:34 [email or web address]    EGD  Result Date: 5/25/2025  Narrative: Table formatting from the original result was not included. [facility] [address] [facility] [address] DATE OF SERVICE: 5/19/25 PHYSICIAN(S): Attending: Carlo Montoya MD Fellow: Jackelyn Davidson DO INDICATION: Esophagitis Odynophagia POST-OP DIAGNOSIS: See the impression  below. PREPROCEDURE: Informed consent was obtained for the procedure, including sedation.  Risks of perforation, hemorrhage, adverse drug reaction and aspiration were discussed. The patient was placed in the left lateral decubitus position. Patient was explained about the risks and benefits of the procedure. Risks including but not limited to bleeding, infection, and perforation were explained in detail. Also explained about less than 100% sensitivity with the exam and other alternatives. PROCEDURE: EGD DETAILS OF PROCEDURE: Patient was taken to the procedure room where a time out was performed to confirm correct patient and correct procedure. The patient underwent monitored anesthesia care, which was administered by an anesthesia professional. The patient's blood pressure, heart rate, level of consciousness, respirations, oxygen, ECG and ETCO2 were monitored throughout the procedure. The scope was introduced through the mouth and advanced to the second part of the duodenum. Retroflexion was performed in the cardia, fundus and incisura. Prior to the procedure, the patient's H. Pylori status was unknown. The patient experienced no blood loss. The procedure was not difficult. The patient tolerated the procedure well. There were no apparent adverse events. ANESTHESIA INFORMATION: ASA: III Anesthesia Type: Anesthesia type not filed in the log. MEDICATIONS: simethicone (MYLICON) 40 mg in sterile water 120 mL 40 mg (Totals for administrations occurring from 1205 to 1243 on 05/19/25) FINDINGS: Single malignant-appearing mass (traversable) in the lower third of the esophagus; performed cold forceps biopsy with partial removal. Extending from 37cm to 40cm in the esophagus covering at least 50% of the circumference, arising in what appears to be Butler's esophagus extending from 36cm to GEJ at 40cm. C3M4 Butler's esophagus observed with an associated lesion 3 cm hiatal hernia - GE junction 40 cm from the incisors,  diaphragmatic impression 43 cm from the incisors:  Hill classification: Grade I The upper third of the esophagus and middle third of the esophagus appeared normal. Edematous, erythematous and granular mucosa in the cardia, fundus of the stomach, body of the stomach, incisura and antrum; performed cold forceps biopsy to rule out H. pylori The duodenal bulb and 2nd part of the duodenum appeared normal. SPECIMENS: ID Type Source Tests Collected by Time Destination 1 : Bx r/o h. pylori Tissue Stomach TISSUE EXAM Jackelyn Davidson, DO 5/19/2025 12:25 PM  2 : Bx GE junction mass Tissue Esophagogastric junction TISSUE EXAM Jackelynjeff Davidson, DO 5/19/2025 12:35 PM      Impression: Single malignant-appearing mass in the lower third of the esophagus; performed cold forceps biopsy with partial removal C3M4 Butler's esophagus with associated lesion 3 cm hiatal hernia The upper third of the esophagus and middle third of the esophagus appeared normal. Edematous, erythematous, granular mucosa in the cardia, fundus of the stomach, body of the stomach, incisura and antrum; performed cold forceps biopsy to rule out H. pylori The duodenal bulb and 2nd part of the duodenum appeared normal. RECOMMENDATION: Await pathology results Return to floor Resume diet as tolerated Continue twice daily PPI Pending pathology results will need referral to medical and surgical oncology teams    Carlo Montoya MD     CT chest abdomen pelvis w contrast  Result Date: 5/17/2025  Narrative: CT CHEST, ABDOMEN AND PELVIS WITH IV CONTRAST INDICATION: odynophagia for several months, unable to tolerate any PO today. COMPARISON: 3/6/2024 TECHNIQUE: CT examination of the chest, abdomen and pelvis was performed. Multiplanar 2D reformatted images were created from the source data. This examination, like all CT scans performed in the [facility], was performed utilizing techniques to minimize radiation dose exposure, including the use of iterative  reconstruction and automated exposure control. Radiation dose length product (DLP) for this visit: 1821.69 mGy-cm. IV Contrast: 100 mL of iohexol (OMNIPAQUE) Enteric Contrast: Not administered. FINDINGS: CHEST LUNGS: Right basilar atelectasis. No tracheal or endobronchial lesion. PLEURA: Unremarkable. HEART/GREAT VESSELS: Heavy atherosclerotic coronary artery calcification. No thoracic aortic aneurysm. MEDIASTINUM AND ANGIE: Mild wall thickening and hyperenhancement of the distal thoracic esophagus. Ingested material retained within the mid thoracic esophagus. CHEST WALL AND LOWER NECK: Unremarkable. ABDOMEN LIVER/BILIARY TREE: Unremarkable. GALLBLADDER: No calcified gallstones. No pericholecystic inflammatory change. SPLEEN: Unremarkable. PANCREAS: Unremarkable. ADRENAL GLANDS: Unremarkable. KIDNEYS/URETERS: No hydronephrosis or urinary tract calculi. Subcentimeter hypoattenuating renal lesion(s), too small to characterize but statistically likely benign, which do not warrant follow-up (Radiology June 2019). Postsurgical changes at the right  kidney. STOMACH AND BOWEL: Colonic diverticulosis without findings of acute diverticulitis. APPENDIX: No findings to suggest appendicitis. ABDOMINOPELVIC CAVITY: No ascites. No pneumoperitoneum. No lymphadenopathy. VESSELS: Unremarkable for patient's age. PELVIS REPRODUCTIVE ORGANS: Unremarkable for patient's age. URINARY BLADDER: Unremarkable. ABDOMINAL WALL/INGUINAL REGIONS: Unremarkable. BONES: No acute fracture or suspicious osseous lesion.     Impression: Mild wall thickening and hyperenhancement of the distal thoracic esophagus compatible with esophagitis. Ingested material retained within the mid thoracic esophagus. Gastroenterology consult recommended Workstation performed: [id number]      Cubital Tunnel  Result Date: 5/10/2025  Narrative: ULTRASOUND BILATERAL ELBOW (CUBITAL TUNNEL SYNDROME PROTOCOL) INDICATION:   G56.23: Lesion of ulnar nerve, bilateral upper limbs.  TECHNIQUE:   Using a high frequency transducer, the right and left ulnar nerves were individually scanned to evaluate for cubital tunnel syndrome and presence of ulnar nerve dislocation. Gray scale and color doppler ultrasound was used. COMPARISON:  None. FINDINGS: RIGHT SIDE: Ulnar nerve cross sectional area proximally: 10.0 sq mm. Maximum ulnar nerve cross sectional area within or near the cubital tunnel: 15.2 sq mm. Ulnar nerve cross sectional area distal to cubital tunnel: 13.2 sq mm. Maximal ulnar nerve/proximal cross sectional ratio: 1.5 No evidence of ulnar nerve dislocation from the cubital tunnel on dynamic flexion-extension evaluation. No accessory anconeus epitrochlearis muscle. LEFT SIDE: Ulnar nerve cross sectional area proximally: 9.9 sq mm. Maximum ulnar nerve cross sectional area within or near the cubital tunnel: 16.4 sq mm. Ulnar nerve cross sectional area distal to cubital tunnel: 9.4 sq mm. Maximal ulnar nerve/proximal cross sectional ratio: 1.6 No evidence of ulnar nerve dislocation from the cubital tunnel on dynamic flexion-extension evaluation. No accessory anconeus epitrochlearis muscle.     Impression: RIGHT CUBITAL TUNNEL: Enlarged ulnar nerve near/within cubital tunnel suspicious for ulnar neuropathy/cubital tunnel syndrome. LEFT CUBITAL TUNNEL: Enlarged ulnar nerve near/within cubital tunnel, suggestive of ulnar neuropathy/cubital tunnel syndrome. Note: Ultrasound diagnosis of cubital tunnel syndrome is suggested with visualization of hypoechoic enlargement of the ulnar nerve just proximal to the cubital tunnel, usually with a transition to normal size within the cubital tunnel.  A cross sectional  area of ulnar nerve greater than 9 sq mm at the site of maximal enlargement or increase in size with a ratio of greater than 2.8 compared to the proximal is considered abnormal. Reference: JS Anna Marie, FO [place], MS [place]: Ultrasonographic Swelling Ratio in the Diagnosis of Ulnar Neuropathy at  the Elbow. Muscle Nerve. 2008 Oct;38(4):1231-5. https://pubmed.ncbi.nlm.nih.gov/69324432/ Workstation performed: [id number]     Home Study  Result Date: 5/9/2025  Narrative: Table formatting from the original result was not included. Images from the original result were not included. Respiration Report  Patient Information        Full Name: Yoni Castillo Patient ID: [id number]  Height: 182.9 cm Weight: 127.9 kg BMI: 38.2  YOB: 1965 Age: 60 Gender: Male Home Sleep Testing Interpretation Report STUDY FORMAT: The patient was admitted to the sleep laboratory at the [facility] and oriented to the home sleep study testing procedure and equipment.  The home sleep testing study was performed using the [employer] Nox-T3 Recorder which is a Type III monitoring system.  A return demonstration by the patient helped to assure correct usage.  The following parameters were monitored: p-flow via nasal cannula, body position, oximetry, pulse rate and respiratory effort via thoracic and abdominal RIP belts.  The sleep study was scored following the rules established by the American Academy of Sleep Medicine (AASM).  Hypopneas are defined as a >=30% drop in flow for >=10 seconds that are associated with >=4% desaturations.  LADNON is the Respiratory Event Index (number of respiratory events per hour) and is a surrogate for the apnea/hypopnea index (AHI). PATIENT HISTORY: Home sleep testing was undertaken to evaluate this patient with suggestive symptoms and /or risk factors for sleep  disordered breathing. TESTING RESULTS: The test results are from 1 Night.  The total time in bed (analysis time) was 6h 59m minutes.  The patient had a total of 94 respiratory events made up of 1 obstructive apneas, 0 central apneas,  0 mixed apneas and 93 hypopneas resulting in a respiratory event index (LANDON) of 14.7 .  The lowest SPO2 recorded is 81.0% and 93.5 minutes during the study was spent with  saturations <90%..  The snore index was 8.3%.     Impression: Moderate obstructive sleep apnea Severe hypoxia that is out of proportion to the LANDON Obesity hypoventilation also to be considered Limitations of home sleep testing compromises accuracy.  Based on the results of this study, a follow-up study to titrate positive airway pressure is recommended.  Clinical correlation is advised.  Board Certified Sleep Physician Data Report  Recording Information   Recording Date: 2025 Analysis Duration (TRT): 6h 59m  Recording Tags:  Analysis Start Time (Lights out): 11:59 PM  Device Type: T3 Analysis Stop Time (Lights on): 6:58 AM   Summary  Est Total Sleep Time (TST): 6h 23m Est. Sleep Efficiency: 38 % AHI:  ANGELITA:  Snore %:         Respiratory Parameters Total Supine Non-Supine Count   Apneas + Hypopneas (AH): 14.7 /h 7.6 /h 20.6 /h 94   Apneas: 0.2 /h 0 /h 0.3 /h 1    Obstructive (OA): 0.2 /h 0 /h 0.3 /h 1    Mixed (MA):  0 /h 0 /h 0 /h 0    Central (CA):  0 /h 0 /h 0 /h 0   Hypopneas:  14.6 /h 7.6 /h 20.3 /h 93    Obstructive (OH): 0 /h 0 /h 0 /h 0    Central (CH):  0 /h 0 /h 0 /h 0   Obstructive Apnea Hypopnea (OA + MA + OH): 0.2 /h 0 /h 0.3 /h 1   Central Apnea Hypopnea (CA + CH): 0 /h 0 /h 0 /h 0   RDI (A+H+RERAs) 14.7 /h 7.6 /h 20.6 /h 94   Hypoventilation: 0 /h 0 /h 0 /h 0   Respiration Rate (per m): 17.3 /m 19.4 /m 15.6 /m                Signal Quality Percentage     Percentage    Oximeter: 99.2 %    Abdomen RIP: 100 %    Nasal Cannula: 100 %    Thorax RIP: 100 %          Snore Total Supine Non-Supine Duration   Snore: 8.3 % 14.3 % 3.4 % 31.8 m  Flow Limitation: 0 % 0 % 0 % 0 m  Cheyne-Mason Breathin % 0 % 0 % 0 m  Paradoxical Breathin % 0 % 0 % 0 m              Oxygen Saturation (SpO2) Total Supine Non-Supine Duration   Oxygen Desaturation Index (ANGELITA): 21 /h 10.8 /h 29.4 /h    Average SpO2: 90.3 % 90.6 % 90.1 %    Minimum SpO2: 81 % 85 % 81 %    SpO2 Duration < 90% 24.4 % 8.5 % 37.5 % 93.5 m  SpO2  Duration <= 88% 10 % 0.5 % 17.7 % 38.2 m  SpO2 Duration < 85% 0.2 % 0 % 0.4 % 0.8 m  Average Desat Drop: 3.6 % 3.3 % 3.8 %              Position & Analysis Time Duration Percentage   Duration Percentage  Supine (in TST): 173 m 45.1 %  Movement (in TST): 237 m 61.8 %  Non-Supine (in TST): 210.1 m 54.8 %  Invalid Data (Excluded): 0 m 0 %    Left (in TST): 171.8 m 44.8 %          Prone (in TST): 0 m 0 %          Right (in TST): 38.3 m 10 %          Unknown (in TST): 0 m 0 %        Upright (in TRT): 36.7 m 8.7 %                      Pulse Bpm    Minutes   Average (in TST): 85.7     Duration < 40 bpm: 0     Max (in TST): 106     Duration > 100 bpm: 2.1     Max (in TRT): 113     Duration > 90 bpm: 156.2     Min (in TST): 29                         Cardiac Events Index Count   Index Count  Bradycardia 0 /h 0   Tachycardia 1.6 /h 10   Asystole 0 /h 0   Atrial Fibrillation 0 /h 0              Trend Overview      MRI brain without contrast  Result Date: 5/7/2025  Narrative: MRI BRAIN WITHOUT CONTRAST INDICATION: G43.709: Chronic migraine without aura, not intractable, without status migrainosus. COMPARISON:   CT head from 2/16/2020 TECHNIQUE:  Multiplanar, multisequence imaging of the brain was performed. IMAGE QUALITY:  Diagnostic. FINDINGS: BRAIN PARENCHYMA:  There is no discrete mass, mass effect or midline shift. There is no intracranial hemorrhage.  There is no evidence of acute infarction and diffusion imaging is unremarkable. Small scattered hyperintensities on T2/FLAIR imaging are noted in the periventricular and subcortical white matter demonstrating an appearance that is statistically most likely to represent mild microangiopathic change. VENTRICLES:  Normal for the patient's age. SELLA AND PITUITARY GLAND:  Normal. ORBITS: Aphakic globes. PARANASAL SINUSES: Polypoid mucosal thickening left maxillary sinus. Mild right maxillary and right mastoid sinus mucosal thickening. VASCULATURE:  Evaluation of the major  intracranial vasculature demonstrates appropriate flow voids. CALVARIUM AND SKULL BASE:  Normal. EXTRACRANIAL SOFT TISSUES:  Normal.     Impression: White matter changes suggestive of chronic microangiopathy. No acute intracranial pathology. Workstation performed: [id number]         I personally reviewed and interpreted the available history, laboratory and imaging data, including: hospital course, EGD, path, labs, endo notes, cross-sectional imaging                   [1]   Patient Active Problem List  Diagnosis    Essential hypertension    Type 2 diabetes mellitus with chronic kidney disease, without long-term current use of insulin (HCC)    Class 2 severe obesity with serious comorbidity in adult (HCC)    Low back pain with sciatica    Cervicalgia    Cervical radiculopathy    Neuropathy    Chronic kidney disease, stage 3 (HCC)    Ventral hernia without obstruction or gangrene    Incisional hernia without obstruction or gangrene    Acquired absence of other left toe(s) (HCC)    Dupuytren's disease of finger with nodules without contracture    Dog bite    Nephrolithiasis    Mucoid cyst of joint    Preoperative cardiovascular examination    Tenosynovitis of finger and hand    Wrist stiffness, right    Stiffness of finger joint of right hand    Anemia    Right wrist tendonitis    Acute pain of right shoulder    Preoperative clearance    JEWEL (obstructive sleep apnea)    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)    Opioid dependence, uncomplicated (HCC)    Other dysphagia    Hyponatremia    Esophageal mass    Esophageal carcinoma (HCC)    Adhesive capsulitis of right shoulder associated with type 2 diabetes mellitus  (HCC)   [2]   Past Medical History:  Diagnosis Date    Allergic 1968    Seasonal    Arthritis 2012    Brain concussion Multiple    Cervical disc disease     Chronic kidney disease 2012    Chronic pain disorder     CTS (carpal tunnel syndrome) 2002    Diabetes mellitus  (HCC)     Full dentures     only using upper    Head injury 1975    Headache(784.0) 1980    High blood sugar     Hypertension     Kidney stone     Liver disease     Low back pain     Cannot remember    Lumbar disc disease     Lumbosacral disc disease     Cannot remember    Migraines     Neuropathy in diabetes (HCC)     RLS (restless legs syndrome)     Skin cancer     in past    Thoracic disc disorder     Cannot remember    Visual impairment 2017    Cataracts. Surgery to correct in 2021   [3]   Past Surgical History:  Procedure Laterality Date    AMPUTATION  2022    Little toe left    CARPAL TUNNEL RELEASE  2002    CATARACT EXTRACTION Bilateral     COLONOSCOPY      CONTRACTURE Right 01/20/2025    Procedure: Right wrist and hand extensor tenolysis and wrist capsulotomy;  Surgeon: Carroll Mccarthy MD;  Location: [facility];  Service: Orthopedics    EYE SURGERY  2021    FOOT SURGERY  2022    Left Foot    FRACTURE SURGERY  1968    Right Tib/Fib    GANGLION CYST EXCISION Left 03/25/2024    Procedure: EXCISION MUCOID CYST, INDEX FINGER DIP;  Surgeon: Carroll Mccarthy MD;  [facility];  Service: Orthopedics    GANGLION CYST EXCISION Right 05/20/2024    Procedure: EXCISION MUCOID CYST - RIGHT INDEX AND MIDDLE FINGERS;  Surgeon: Carroll Mccarthy MD;  [facility];  Service: Orthopedics    HERNIA REPAIR      INCISION AND DRAINAGE  01/16/2024    IR NEPHROURETERAL ACCESS FOR UROLOGY PCNL  04/07/2025    KIDNEY SURGERY Right 06/2012    KNEE SURGERY Right 1980    MOUTH SURGERY      WY AMPUTATION METATARSAL W/TOE SINGLE Left 6/1/2022    Procedure: RAY RESECTION FOOT;  Surgeon: Hannah Melgoza DPM;  [facility];  Service: Podiatry    WY INCISION & DRAINAGE ABSCESS COMPLICATED/MULTIPLE Right 09/17/2024    Procedure: Irrigation and debridement right wrist and hand, any indicated procedures;  Surgeon: Carroll Mccarthy MD;  [facility];  Service: Orthopedics    WY INCISION  EXTENSOR TENDON SHEATH WRIST Right 01/20/2025    Procedure: RELEASE DEQUERVAINS - Right;  Surgeon: Carroll Mccarthy MD;  [facility];  Service: Orthopedics    LA PERQ NL/PL LITHOTRP COMPLEX >2 CM MLT LOCATIONS Right 04/11/2025    Procedure: NEPHROLITHOTOMY  PERCUTANEOUS (PCNL);  Surgeon: Adis Hamilton MD;  [facility];  Service: Urology    LA RPR AA HERNIA 1ST < 3 CM REDUCIBLE N/A 7/14/2023    Procedure: REPAIR HERNIA INCISIONAL;  Surgeon: Matt Melo MD;  [facility];  Service: General    LA SYNOVECTOMY EXTENSOR TENDON SHTH WRIST 1 CMPRT Right 10/03/2024    Procedure: Irrigation and debridement, right wrist, volar and dorsal, possible extensor and flexor tenosynovectomy, any indicated procedures;  Surgeon: Carroll Mccarthy MD;  [facility];  Service: Orthopedics    LA SYNOVECTOMY EXTENSOR TENDON SHTH WRIST 1 CMPRT Right 03/24/2025    Procedure: SYNOVECTOMY / DEBRIDEMENT - FLEXOR CARPI RADIALIS AND FLEXOR CARPI ULNARIS TENDONS- RIGHT;  Surgeon: Carroll Mccarthy MD;  [facility];  Service: Orthopedics    TONSILLECTOMY  1968    Yes    WOUND DEBRIDEMENT Left 4/28/2022    Procedure: Left foot washout;  Surgeon: Hannah Melgoza DPM;  [facility];  Service: Podiatry    WOUND DEBRIDEMENT Left 6/6/2022    Procedure: DEBRIDEMENT WOUND (WASH OUT);  Surgeon: Hannah Melgoza DPM;  [facility];  Service: Podiatry   [4]   Family History  Problem Relation Name Age of Onset    Diabetes Paternal Grandmother Michelle     Diabetes Paternal Grandfather Maurice     Arthritis Maternal Grandmother Greatgrandmother  Corie    [5]   Current Outpatient Medications:     acetaminophen (TYLENOL) 500 mg tablet, Take 2 tablets (1,000 mg total) by mouth every 8 (eight) hours, Disp: 60 tablet, Rfl: 0    amitriptyline (ELAVIL) 10 mg tablet, 30mg at bedtime, Disp: 90 tablet, Rfl: 5    ammonium lactate (LAC-HYDRIN) 12 % lotion, as needed in the morning and as  needed in the evening., Disp: , Rfl:     carvedilol (COREG) 25 mg tablet, Take 1 tablet (25 mg total) by mouth 2 (two) times a day with meals, Disp: 200 tablet, Rfl: 1    Empagliflozin (Jardiance) 25 MG TABS, Take 1 tablet (25 mg total) by mouth daily, Disp: 90 tablet, Rfl: 1    hydroCHLOROthiazide 12.5 mg tablet, Take 1 tablet (12.5 mg total) by mouth 2 (two) times a day, Disp: 100 tablet, Rfl: 3    ketoconazole (NIZORAL) 2 % cream, if needed, Disp: , Rfl:     lidocaine (Lidoderm) 5 %, Apply 1 patch topically over 12 hours daily Remove & Discard patch within 12 hours or as directed by MD, Disp: 30 patch, Rfl: 0    lidocaine (LMX) 4 % cream, Apply topically as needed for moderate pain, Disp: 30 g, Rfl: 0    lisinopril-hydrochlorothiazide (PRINZIDE,ZESTORETIC) 20-12.5 MG per tablet, Take 1 tablet by mouth in the morning and 1 tablet before bedtime., Disp: 200 tablet, Rfl: 1    methocarbamol (ROBAXIN) 750 mg tablet, Take 2 tablets (1,500 mg total) by mouth every 8 (eight) hours, Disp: 60 tablet, Rfl: 2    pantoprazole (PROTONIX) 40 mg tablet, Take 1 tablet (40 mg total) by mouth 2 (two) times a day before meals, Disp: 60 tablet, Rfl: 0    rivaroxaban (Xarelto Starter Pack) 15 & 20 MG starter pack, Take 15 mg by mouth twice daily for 21 days, then 20 mg once daily thereafter., Disp: 51 each, Rfl: 0    traMADol (Ultram) 50 mg tablet, Take 1 tablet (50 mg total) by mouth every 8 (eight) hours as needed for moderate pain and 2 pills at bedtime at least 6 hours after last dose of the day, Disp: 100 tablet, Rfl: 0    Ubrogepant (UBRELVY) 100 MG tablet, Take 1 tablet (100 mg) one time as needed for migraine. May repeat one additional tablet (100 mg) at least two hours after the first dose. Do not use more than two doses per day, or for more than twelve days per month., Disp: 12 tablet, Rfl: 3    Accu-Chek FastClix Lancets MISC, Use to test blood sugar once a day (Patient not taking: Reported on 5/6/2025), Disp: 102 each,  Rfl: 0    Blood Glucose Monitoring Suppl (Accu-Chek Guide Me) w/Device KIT, Use to check blood sugar once a day (Patient not taking: Reported on 5/6/2025), Disp: 1 kit, Rfl: 0    glucose blood (Accu-Chek Guide) test strip, Use to check blood sugar once a day, Disp: 100 strip, Rfl: 0    Insulin Pen Needle (BD Pen Needle [place] U/F) 32G X 4 MM MISC, Use in the morning (Patient not taking: Reported on 5/17/2025), Disp: 100 each, Rfl: 1

## 2025-06-05 ENCOUNTER — CONSULT (OUTPATIENT)
Dept: CARDIAC SURGERY | Facility: CLINIC | Age: 60
End: 2025-06-05
Attending: PHYSICIAN ASSISTANT
Payer: MEDICARE

## 2025-06-05 ENCOUNTER — OFFICE VISIT (OUTPATIENT)
Dept: OBGYN CLINIC | Facility: OTHER | Age: 60
End: 2025-06-05
Payer: MEDICARE

## 2025-06-05 VITALS
WEIGHT: 281.09 LBS | SYSTOLIC BLOOD PRESSURE: 112 MMHG | TEMPERATURE: 97.6 F | HEART RATE: 76 BPM | DIASTOLIC BLOOD PRESSURE: 60 MMHG | OXYGEN SATURATION: 93 % | BODY MASS INDEX: 38.12 KG/M2

## 2025-06-05 VITALS — HEIGHT: 72 IN | WEIGHT: 280 LBS | BODY MASS INDEX: 37.93 KG/M2

## 2025-06-05 DIAGNOSIS — C15.5 MALIGNANT NEOPLASM OF LOWER THIRD OF ESOPHAGUS (HCC): ICD-10-CM

## 2025-06-05 DIAGNOSIS — M75.01 ADHESIVE CAPSULITIS OF RIGHT SHOULDER ASSOCIATED WITH TYPE 2 DIABETES MELLITUS  (HCC): Primary | ICD-10-CM

## 2025-06-05 DIAGNOSIS — C15.9 ESOPHAGEAL CARCINOMA (HCC): Primary | ICD-10-CM

## 2025-06-05 DIAGNOSIS — E11.618 ADHESIVE CAPSULITIS OF RIGHT SHOULDER ASSOCIATED WITH TYPE 2 DIABETES MELLITUS  (HCC): Primary | ICD-10-CM

## 2025-06-05 PROCEDURE — 99205 OFFICE O/P NEW HI 60 MIN: CPT | Performed by: THORACIC SURGERY (CARDIOTHORACIC VASCULAR SURGERY)

## 2025-06-05 PROCEDURE — 99203 OFFICE O/P NEW LOW 30 MIN: CPT | Performed by: ORTHOPAEDIC SURGERY

## 2025-06-05 PROCEDURE — 99213 OFFICE O/P EST LOW 20 MIN: CPT | Performed by: ORTHOPAEDIC SURGERY

## 2025-06-05 NOTE — PROGRESS NOTES
I personally examined the patient and reviewed the history provided.  I agree with the note and the assessment and plan by Dr. Evangelina Haley MD.     Assessment & Plan  Adhesive capsulitis of right shoulder associated with type 2 diabetes mellitus  (HCC)    Orders:    Ambulatory Referral to Physical Therapy; Future  The patient has an examination consistent with adhesive capsulitis of the shoulder.  I have discussed with the patient the pathophysiology of this diagnosis and reviewed how the examination correlates with this diagnosis.  We discussed with the patient that his adhesive capsulitis is likely associated with his type 2 diabetes mellitus, and he may benefit from appropriate control of his glucose with his internal medicine team. Treatment options were discussed at length and after discussing these treatment options, patient was provided a prescription for referral to physical therapy.  Explained to the patient that this can take 6-9 mos to improve from the start of treatment.  We will see him back in 6 to 8 weeks after completion of his physical therapy to evaluate for improvement of symptoms.     Subjective:   Patient ID: Yoni Castillo is a 60 y.o. male      HPI  The patient presents with a chief complaint of right shoulder pain.   The pain began several month(s) ago and is not associated with an acute injury.  Patient reports right shoulder pain since November, after he had a irrigation and debridement of his right wrist with Dr. Mccarthy (DOS 10/2/24). The patient describes the pain as aching in intensity,  occurring with increasing frequency in timing, and localizes the pain to the  right glenohumeral joint.  The pain is worse with movement and raising arm over head and relieved by rest, heat, ice.  The pain is not associated with numbness and tingling.  The pain is not associated with constitutional symptoms. The patient is awoken at night by the pain.    The patient has had no prior treatment.        The following portions of the patient's history were reviewed and updated as appropriate: allergies, current medications, past family history, past medical history, past social history, past surgical history and problem list.    Objective:  Ht 6' (1.829 m) Comment: verbal  Wt 127 kg (280 lb)   BMI 37.97 kg/m²       Right Shoulder Exam     Range of Motion   Active abduction:  100   Passive abduction:  110   External rotation:  10   Forward flexion:  100   Internal rotation 0 degrees:  L5     Muscle Strength   Abduction: 3/5   Internal rotation: 4/5   External rotation: 5/5   Supraspinatus: 4/5   Subscapularis: 4/5   Biceps: 4/5     Other   Sensation: normal  Pulse: present            I have personally reviewed pertinent films in PACS and my interpretation is as follows.    Xray right shoulder shows minimal dengerative changes of his right glenohumeral joint. No acute fracture.      Records Reviewed: office notes with Dr. Ji on 5/13/25 and 5/23/25 and office notes with Dr. Mccarthy on 5/6/25 reviewed.

## 2025-06-05 NOTE — PROGRESS NOTES
Name: Yoni Castillo      : 1965      MRN: [id number]  Encounter Provider: Agustina Kiser MD  Encounter Date: 2025   Encounter department: [facility] [place]  :  Assessment & Plan  Esophageal carcinoma (HCC)  Today in the office, we had a long conversation regarding his new diagnosis of esophageal cancer.  At this time, we discussed that the traditional treatment will include upfront chemotherapy, plus minus radiation, and then restaging and then surgery.  We discussed that treatment for esophageal cancer is a marathon event.  We also discussed the importance of nutrition over the next few months and optimizing protein including protein drinks.  At this time, his workup has not been completed but appointments have been appropriately scheduled for a PET CT scan as well as an EUS/EGD.  He also has follow-up appointments with medical oncology and radiation oncology in the next week.  I will schedule him for a follow-up visit with me in 4 months after he has been restaged after he has received chemotherapy plus minus radiation.           Thoracic History     Oncology History   Esophageal carcinoma (HCC)   2025 Biopsy    B. Esophagogastric junction, mass, biopsy:  -Carcinoma, moderately to poorly differentiated, with glandular, signet ring cell and squamous features.       2025 Initial Diagnosis    Esophageal carcinoma (HCC)          History of Present Illness   HPI  Yoni Castillo is a 60 y.o. male who presents to my office with a new diagnosis of esophageal cancer.  I have personally reviewed his CT scan in PACS.  This demonstrates the mass at the distal esophagus but no signs of metastatic disease.  He also underwent an EGD, which I also personally reviewed.  This demonstrated a mass at the GE junction that is biopsy-proven esophageal cancer.    Today in the office, the patient reports that he has started having some dysphagia approximately 4 years ago but that  the symptoms got particularly worse over the past year.  He does report a 10 pound weight loss over the past month.  He reports that soft foods are going down okay but not all the time.  He is able to get liquids down.    His past surgical history is significant for an incarcerated ventral hernia that required repair x 2.    He denies a smoking history or drinking history.    I have personally reviewed the most relevant notes in the chart including from gastroenterology as well as from surgical oncology.    Review of Systems   Constitutional: Negative.  Negative for activity change, chills, fatigue, fever and unexpected weight change.   HENT:  Positive for trouble swallowing. Negative for sore throat and voice change.    Eyes: Negative.  Negative for visual disturbance.   Respiratory:  Negative for cough, chest tightness, shortness of breath, wheezing and stridor.    Cardiovascular:  Negative for chest pain and leg swelling.   Gastrointestinal: Negative.    Endocrine: Negative.    Genitourinary: Negative.    Musculoskeletal: Negative.    Skin: Negative.    Allergic/Immunologic: Negative.    Neurological:  Negative for seizures, syncope, speech difficulty, weakness, light-headedness and headaches.   Hematological:  Negative for adenopathy.   Psychiatric/Behavioral: Negative.        Medical History Reviewed by provider this encounter:     .  Past Medical History   Past Medical History[1]  Past Surgical History[2]  Family History[3]   reports that he has never smoked. He has never used smokeless tobacco. He reports current alcohol use. He reports that he does not use drugs.  Current Outpatient Medications   Medication Instructions    Accu-Chek FastClix Lancets MISC Use to test blood sugar once a day    acetaminophen (TYLENOL) 1,000 mg, Oral, Every 8 hours    amitriptyline (ELAVIL) 10 mg tablet 30mg at bedtime    ammonium lactate (LAC-HYDRIN) 12 % lotion 2 times daily PRN    Blood Glucose Monitoring Suppl (Accu-Chek  Guide Me) w/Device KIT Use to check blood sugar once a day    carvedilol (COREG) 25 mg, Oral, 2 times daily with meals    Empagliflozin (JARDIANCE) 25 mg, Oral, Daily    glucose blood (Accu-Chek Guide) test strip Use to check blood sugar once a day    hydroCHLOROthiazide 12.5 mg, Oral, 2 times daily    Insulin Pen Needle (BD Pen Needle [place] U/F) 32G X 4 MM MISC Does not apply, Daily    ketoconazole (NIZORAL) 2 % cream As needed    lidocaine (Lidoderm) 5 % 1 patch, Topical, Daily, Remove & Discard patch within 12 hours or as directed by MD    lidocaine (LMX) 4 % cream Topical, As needed    lisinopril-hydrochlorothiazide (PRINZIDE,ZESTORETIC) 20-12.5 MG per tablet 1 tablet, Oral, 2 times daily    methocarbamol (ROBAXIN) 1,500 mg, Oral, Every 8 hours scheduled    pantoprazole (PROTONIX) 40 mg, Oral, 2 times daily before meals    rivaroxaban (Xarelto Starter Pack) 15 & 20 MG starter pack Take 15 mg by mouth twice daily for 21 days, then 20 mg once daily thereafter.    traMADol (ULTRAM) 50 mg, Oral, Every 8 hours PRN, and 2 pills at bedtime at least 6 hours after last dose of the day    Ubrogepant (UBRELVY) 100 MG tablet Take 1 tablet (100 mg) one time as needed for migraine. May repeat one additional tablet (100 mg) at least two hours after the first dose. Do not use more than two doses per day, or for more than twelve days per month.   Allergies[4]   Medications Ordered Prior to Encounter[5]   Social History[6]     Objective   /60 (BP Location: Left arm, Patient Position: Sitting, Cuff Size: Large)   Pulse 76   Temp 97.6 °F (36.4 °C) (Temporal)   Wt 127 kg (281 lb 1.4 oz)   SpO2 93%   BMI 38.12 kg/m²     Pain Screening:  Pain Score:   8 (back and shoulders)  ECOG    Physical Exam  Vitals and nursing note reviewed.   Constitutional:       Appearance: He is well-developed. He is obese. He is not diaphoretic.   HENT:      Head: Normocephalic and atraumatic.      Nose: Nose normal. No congestion.       Mouth/Throat:      Mouth: Mucous membranes are moist.      Pharynx: Oropharynx is clear.     Eyes:      Extraocular Movements: Extraocular movements intact.      Pupils: Pupils are equal, round, and reactive to light.     Neck:      Trachea: No tracheal deviation.     Cardiovascular:      Rate and Rhythm: Normal rate and regular rhythm.      Heart sounds: Normal heart sounds. No murmur heard.  Pulmonary:      Effort: Pulmonary effort is normal. No respiratory distress.      Breath sounds: Normal breath sounds. No stridor. No wheezing or rales.   Chest:      Chest wall: No tenderness.   Abdominal:      General: Bowel sounds are normal. There is no distension.      Palpations: Abdomen is soft.      Tenderness: There is no abdominal tenderness.     Musculoskeletal:         General: Normal range of motion.      Cervical back: Normal range of motion.   Lymphadenopathy:      Cervical: No cervical adenopathy.     Skin:     General: Skin is warm and dry.      Coloration: Skin is not pale.      Findings: No erythema or rash.     Neurological:      Mental Status: He is alert and oriented to person, place, and time.     Psychiatric:         Behavior: Behavior normal.         Thought Content: Thought content normal.         Judgment: Judgment normal.         Labs:   Results for orders placed or performed during the hospital encounter of 06/03/25   CBC and differential   Result Value Ref Range    WBC 4.90 4.31 - 10.16 Thousand/uL    RBC 4.59 3.88 - 5.62 Million/uL    Hemoglobin 14.1 12.0 - 17.0 g/dL    Hematocrit 41.5 36.5 - 49.3 %    MCV 90 82 - 98 fL    MCH 30.7 26.8 - 34.3 pg    MCHC 34.0 31.4 - 37.4 g/dL    RDW 12.5 11.6 - 15.1 %    MPV 10.1 8.9 - 12.7 fL    Platelets 219 149 - 390 Thousands/uL    nRBC 0 /100 WBCs    Segmented % 72 43 - 75 %    Immature Grans % 0 0 - 2 %    Lymphocytes % 14 14 - 44 %    Monocytes % 11 4 - 12 %    Eosinophils Relative 2 0 - 6 %    Basophils Relative 1 0 - 1 %    Absolute Neutrophils 3.50 1.85  - 7.62 Thousands/µL    Absolute Immature Grans 0.01 0.00 - 0.20 Thousand/uL    Absolute Lymphocytes 0.69 0.60 - 4.47 Thousands/µL    Absolute Monocytes 0.56 0.17 - 1.22 Thousand/µL    Eosinophils Absolute 0.11 0.00 - 0.61 Thousand/µL    Basophils Absolute 0.03 0.00 - 0.10 Thousands/µL   Basic metabolic panel   Result Value Ref Range    Sodium 133 (L) 135 - 147 mmol/L    Potassium 4.6 3.5 - 5.3 mmol/L    Chloride 98 96 - 108 mmol/L    CO2 24 21 - 32 mmol/L    ANION GAP 11 4 - 13 mmol/L    BUN 33 (H) 5 - 25 mg/dL    Creatinine 1.65 (H) 0.60 - 1.30 mg/dL    Glucose 268 (H) 65 - 140 mg/dL    Calcium 9.2 8.4 - 10.2 mg/dL    eGFR 43 ml/min/1.73sq m   Protime-INR   Result Value Ref Range    Protime 14.6 12.3 - 15.0 seconds    INR 1.12 0.85 - 1.19   APTT   Result Value Ref Range    PTT 27 23 - 34 seconds     *Note: Due to a large number of results and/or encounters for the requested time period, some results have not been displayed. A complete set of results can be found in Results Review.        Radiology Results Review : I have reviewed images/report studies in PACS as described above (in the HPI).  Pathology: I have reviewed pathology reports described above.           [1]   Past Medical History:  Diagnosis Date    Allergic 1968    Seasonal    Arthritis 2012    Brain concussion Multiple    Cervical disc disease     Chronic kidney disease 2012    Chronic pain disorder     CTS (carpal tunnel syndrome) 2002    Diabetes mellitus (HCC)     Full dentures     only using upper    Head injury 1975    Headache(784.0) 1980    High blood sugar     Hypertension     Kidney stone     Liver disease     Low back pain     Cannot remember    Lumbar disc disease     Lumbosacral disc disease     Cannot remember    Migraines     Neuropathy in diabetes (HCC)     RLS (restless legs syndrome)     Skin cancer     in past    Thoracic disc disorder     Cannot remember    Visual impairment 2017    Cataracts. Surgery to correct in 2021   [2]   Past  Surgical History:  Procedure Laterality Date    AMPUTATION  2022    Little toe left    CARPAL TUNNEL RELEASE  2002    CATARACT EXTRACTION Bilateral     COLONOSCOPY      CONTRACTURE Right 01/20/2025    Procedure: Right wrist and hand extensor tenolysis and wrist capsulotomy;  Surgeon: Carroll Mccarthy MD;  [facility];  Service: Orthopedics    EYE SURGERY  2021    FOOT SURGERY  2022    Left Foot    FRACTURE SURGERY  1968    Right Tib/Fib    GANGLION CYST EXCISION Left 03/25/2024    Procedure: EXCISION MUCOID CYST, INDEX FINGER DIP;  Surgeon: Carroll Mccarthy MD;  [facility];  Service: Orthopedics    GANGLION CYST EXCISION Right 05/20/2024    Procedure: EXCISION MUCOID CYST - RIGHT INDEX AND MIDDLE FINGERS;  Surgeon: Carroll Mccarthy MD;  [facility];  Service: Orthopedics    HERNIA REPAIR      INCISION AND DRAINAGE  01/16/2024    IR NEPHROURETERAL ACCESS FOR UROLOGY PCNL  04/07/2025    KIDNEY SURGERY Right 06/2012    KNEE SURGERY Right 1980    MOUTH SURGERY      SC AMPUTATION METATARSAL W/TOE SINGLE Left 6/1/2022    Procedure: RAY RESECTION FOOT;  Surgeon: Hannah Melgoza DPM;  [facility];  Service: Podiatry    SC INCISION & DRAINAGE ABSCESS COMPLICATED/MULTIPLE Right 09/17/2024    Procedure: Irrigation and debridement right wrist and hand, any indicated procedures;  Surgeon: Carroll Mccarthy MD;  [facility];  Service: Orthopedics    SC INCISION EXTENSOR TENDON SHEATH WRIST Right 01/20/2025    Procedure: RELEASE DEQUERVAINS - Right;  Surgeon: Carroll Mccarthy MD;  [facility];  Service: Orthopedics    SC PERQ NL/PL LITHOTRP COMPLEX >2 CM MLT LOCATIONS Right 04/11/2025    Procedure: NEPHROLITHOTOMY  PERCUTANEOUS (PCNL);  Surgeon: Adis Hamilton MD;  [facility];  Service: Urology    SC RPR AA HERNIA 1ST < 3 CM REDUCIBLE N/A 7/14/2023    Procedure: REPAIR HERNIA INCISIONAL;  Surgeon: Matt Melo MD;  [facility];  Service: General    ME SYNOVECTOMY EXTENSOR TENDON SHTH WRIST 1 CMPRT Right 10/03/2024    Procedure: Irrigation and debridement, right wrist, volar and dorsal, possible extensor and flexor tenosynovectomy, any indicated procedures;  Surgeon: Carroll Mccarthy MD;  [facility];  Service: Orthopedics    ME SYNOVECTOMY EXTENSOR TENDON SHTH WRIST 1 CMPRT Right 03/24/2025    Procedure: SYNOVECTOMY / DEBRIDEMENT - FLEXOR CARPI RADIALIS AND FLEXOR CARPI ULNARIS TENDONS- RIGHT;  Surgeon: Carroll Mccarthy MD;  [facility];  Service: Orthopedics    TONSILLECTOMY  1968    Yes    WOUND DEBRIDEMENT Left 4/28/2022    Procedure: Left foot washout;  Surgeon: Hannah Melgoza DPM;  [facility];  Service: Podiatry    WOUND DEBRIDEMENT Left 6/6/2022    Procedure: DEBRIDEMENT WOUND (WASH OUT);  Surgeon: Hannah Melgoza DPM;  [facility];  Service: Podiatry   [3]   Family History  Problem Relation Name Age of Onset    Diabetes Paternal Grandmother Michelle     Diabetes Paternal Grandfather Maurice     Arthritis Maternal Grandmother Greatgrandmother  Corie    [4]   Allergies  Allergen Reactions    Cephalexin Hives     Skin peeling  Tolerates penicillins (tolerated unasyn and zosyn)    Metformin GI Intolerance    Pollen Extract Sneezing   [5]   Current Outpatient Medications on File Prior to Visit   Medication Sig Dispense Refill    acetaminophen (TYLENOL) 500 mg tablet Take 2 tablets (1,000 mg total) by mouth every 8 (eight) hours 60 tablet 0    amitriptyline (ELAVIL) 10 mg tablet 30mg at bedtime 90 tablet 5    ammonium lactate (LAC-HYDRIN) 12 % lotion as needed in the morning and as needed in the evening.      carvedilol (COREG) 25 mg tablet Take 1 tablet (25 mg total) by mouth 2 (two) times a day with meals 200 tablet 1    Empagliflozin (Jardiance) 25 MG TABS Take 1 tablet (25 mg total) by mouth daily 90 tablet 1    hydroCHLOROthiazide 12.5 mg tablet Take 1 tablet (12.5 mg total) by  mouth 2 (two) times a day 100 tablet 3    ketoconazole (NIZORAL) 2 % cream if needed      lidocaine (Lidoderm) 5 % Apply 1 patch topically over 12 hours daily Remove & Discard patch within 12 hours or as directed by MD 30 patch 0    lidocaine (LMX) 4 % cream Apply topically as needed for moderate pain 30 g 0    lisinopril-hydrochlorothiazide (PRINZIDE,ZESTORETIC) 20-12.5 MG per tablet Take 1 tablet by mouth in the morning and 1 tablet before bedtime. 200 tablet 1    methocarbamol (ROBAXIN) 750 mg tablet Take 2 tablets (1,500 mg total) by mouth every 8 (eight) hours 60 tablet 2    pantoprazole (PROTONIX) 40 mg tablet Take 1 tablet (40 mg total) by mouth 2 (two) times a day before meals 60 tablet 0    rivaroxaban (Xarelto Starter Pack) 15 & 20 MG starter pack Take 15 mg by mouth twice daily for 21 days, then 20 mg once daily thereafter. 51 each 0    traMADol (Ultram) 50 mg tablet Take 1 tablet (50 mg total) by mouth every 8 (eight) hours as needed for moderate pain and 2 pills at bedtime at least 6 hours after last dose of the day 100 tablet 0    Ubrogepant (UBRELVY) 100 MG tablet Take 1 tablet (100 mg) one time as needed for migraine. May repeat one additional tablet (100 mg) at least two hours after the first dose. Do not use more than two doses per day, or for more than twelve days per month. 12 tablet 3    Accu-Chek FastClix Lancets MISC Use to test blood sugar once a day (Patient not taking: Reported on 5/6/2025) 102 each 0    Blood Glucose Monitoring Suppl (Accu-Chek Guide Me) w/Device KIT Use to check blood sugar once a day (Patient not taking: Reported on 5/6/2025) 1 kit 0    glucose blood (Accu-Chek Guide) test strip Use to check blood sugar once a day 100 strip 0    Insulin Pen Needle (BD Pen Needle [place] U/F) 32G X 4 MM MISC Use in the morning (Patient not taking: Reported on 5/17/2025) 100 each 1     No current facility-administered medications on file prior to visit.   [6]   Social History  Tobacco Use     Smoking status: Never    Smokeless tobacco: Never   Vaping Use    Vaping status: Never Used   Substance and Sexual Activity    Alcohol use: Yes     Alcohol/week: 0.0 - 1.0 standard drinks of alcohol     Comment: ocassional    Drug use: Never    Sexual activity: Not Currently     Partners: Female     Birth control/protection: Abstinence

## 2025-06-05 NOTE — PROGRESS NOTES
Assessment & Plan  Adhesive capsulitis of right shoulder associated with type 2 diabetes mellitus  (HCC)    Orders:    Ambulatory Referral to Physical Therapy; Future      Subjective:   Patient ID: Yoni Castillo is a 60 y.o. male      HPI  The patient presents with a chief complaint of {Right/left:69252} shoulder pain.   The pain began {NUMBERS; 0-10:43224} {Time; day/wk/mo/yr(s):9076} ago and {IS/ IS NOT:55847} associated with an acute injury.  ***. The patient describes the pain as {PAIN QUALITY:99408} in intensity,  {desc; discomfort timing chest:04441} in timing, and localizes the pain to the  {Right/left:31717} {Shoulder crepitus locations:35909}.  The pain is worse with {Aggravating factors extremity pain:18400} and relieved by {Pain upper relieved by:31311}.  The pain {IS/ IS NOT:03459} associated with numbness and tingling.  The pain {IS/ IS NOT:57743} associated with constitutional symptoms. The patient {IS/ IS NOT:61080} awoken at night by the pain.    The patient has had *** treatment. ***      The following portions of the patient's history were reviewed and updated as appropriate: allergies, current medications, past family history, past medical history, past social history, past surgical history and problem list.    Objective:  Ht 6' (1.829 m) Comment: verbal  Wt 127 kg (280 lb)   BMI 37.97 kg/m²       Ortho Exam      I have personally reviewed pertinent films in PACS and my interpretation is as follows.    ***      Records Reviewed: {Outside review:46384} ***

## 2025-06-05 NOTE — ASSESSMENT & PLAN NOTE
Today in the office, we had a long conversation regarding his new diagnosis of esophageal cancer.  At this time, we discussed that the traditional treatment will include upfront chemotherapy, plus minus radiation, and then restaging and then surgery.  We discussed that treatment for esophageal cancer is a marathon event.  We also discussed the importance of nutrition over the next few months and optimizing protein including protein drinks.  At this time, his workup has not been completed but appointments have been appropriately scheduled for a PET CT scan as well as an EUS/EGD.  He also has follow-up appointments with medical oncology and radiation oncology in the next week.  I will schedule him for a follow-up visit with me in 4 months after he has been restaged after he has received chemotherapy plus minus radiation.

## 2025-06-05 NOTE — ASSESSMENT & PLAN NOTE
Orders:    Ambulatory Referral to Physical Therapy; Future  The patient has an examination consistent with adhesive capsulitis of the shoulder.  I have discussed with the patient the pathophysiology of this diagnosis and reviewed how the examination correlates with this diagnosis.  We discussed with the patient that his adhesive capsulitis is likely associated with his type 2 diabetes mellitus, and he may benefit from appropriate control of his glucose with his internal medicine team. Treatment options were discussed at length and after discussing these treatment options, patient was provided a prescription for referral to physical therapy.  Explained to the patient that this can take 6-9 mos to improve from the start of treatment.  We will see him back in 6 to 8 weeks after completion of his physical therapy to evaluate for improvement of symptoms.

## 2025-06-05 NOTE — TELEPHONE ENCOUNTER
Reason for call:   [x] Refill   [] Prior Auth  [] Other:     Office:   [x] PCP/Provider - [facility]  Authorized By: Judd Ji MD    [] Specialty/Provider -     Medication: traMADol (Ultram) 50 mg tablet    Dose/Frequency: Take 1 tablet (50 mg total) by mouth every 8 (eight) hours as needed for moderate pain and 2 pills at bedtime at least 6 hours    Quantity: 100    Pharmacy: [address] [facility]   Does the patient have enough for 3 days?   [x] Yes   [] No - Send as HP to POD    Mail Away Pharmacy   Does the patient have enough for 10 days?   [] Yes   [] No - Send as HP to POD

## 2025-06-06 ENCOUNTER — OFFICE VISIT (OUTPATIENT)
Dept: OCCUPATIONAL THERAPY | Age: 60
End: 2025-06-06
Payer: MEDICARE

## 2025-06-06 DIAGNOSIS — M25.631 WRIST STIFFNESS, RIGHT: ICD-10-CM

## 2025-06-06 DIAGNOSIS — S61.452D ANIMAL BITE OF LEFT HAND WITH INFECTION, SUBSEQUENT ENCOUNTER: ICD-10-CM

## 2025-06-06 DIAGNOSIS — Z47.89 AFTERCARE FOLLOWING SURGERY OF THE MUSCULOSKELETAL SYSTEM: ICD-10-CM

## 2025-06-06 DIAGNOSIS — L08.9 ANIMAL BITE OF LEFT HAND WITH INFECTION, SUBSEQUENT ENCOUNTER: ICD-10-CM

## 2025-06-06 DIAGNOSIS — M65.949 TENOSYNOVITIS OF FINGER AND HAND: Primary | ICD-10-CM

## 2025-06-06 DIAGNOSIS — M77.8 RIGHT WRIST TENDONITIS: ICD-10-CM

## 2025-06-06 DIAGNOSIS — M25.641 STIFFNESS OF FINGER JOINT OF RIGHT HAND: ICD-10-CM

## 2025-06-06 PROCEDURE — 97168 OT RE-EVAL EST PLAN CARE: CPT

## 2025-06-06 PROCEDURE — 97140 MANUAL THERAPY 1/> REGIONS: CPT

## 2025-06-06 PROCEDURE — 97110 THERAPEUTIC EXERCISES: CPT

## 2025-06-06 NOTE — PROGRESS NOTES
"OT Discharge    Today's date: 2025  Patient name: Yoni Castillo  : 1965  MRN: [id number]  Referring provider: Yesenia Carrasquillo PA-C  Dx:   Encounter Diagnosis     ICD-10-CM    1. Tenosynovitis of finger and hand  M65.949       2. Right wrist tendonitis  M77.8       3. Animal bite of left hand with infection, subsequent encounter  S61.452D     L08.9       4. Aftercare following surgery of the musculoskeletal system  Z47.89       5. Stiffness of finger joint of right hand  M25.641       6. Wrist stiffness, right  M25.631           Start Time: 1115  Stop Time: 1156  Total time in clinic (min): 41 minutes    Subjective: \"I've been using it. I'm okay being done.\"       Objective: See treatment diary below    Wrist    Right   Flexion 45 (was 24)   Extension 55 (was 25)   Radial Dev. 6 (was 2)   Ulnar Dev. 30 (was 7)      Right Hand (ext/flex)    D2 D3 D4 D5   MCP 65 (was 40) 67 (was 50) 65 (was 45) 60 (was 40)   PIP 90 (was 76) 85 (was 78) 87 (was -14/) 83 (was -14/70)   DIP 50 (was 45 60 (was 53) 37 (was 27) 54 (was 45)   Kapandji Score (Opposition) 5/10 (was 5/10)                  Thumb    Right   MP  45 (was 40)   IP 60 (was 57)         Strength  Right: 31.7 (was 22.3)  Left: 50.7 (was 56.4)       Assessment: Tolerated treatment well. Pt reports no issues at home using his hand and his independent with his HEPs. He demonstrated improved wrist and digit AROM over course of therapy.  strength also continues to improve over time. Pt will transition to HEP at this time. He understands/agrees with today's plan to discharge from OT.       Plan: Discharge from OT.     Precautions: 3/24 FCR and FCU debridement and tenosynovectomy     Manuals  3/27 5/1 re-eval 5/12 5/15 5/22 6/6    Edema mob   10' 10'      Scar mob    5'      STM   5' 5' 5'                           Ther Ex               Forearm AROM 2x20 2x20 2x20 2x20 2x20 2x20   Wrist P/AROM 2x20 2x20 2x20 2x20 2x20 2x20   Tendon glides 2x20 2x20 2x20 " 2x20 2x20 2x20   Thumb P/AROM X20 all planes X20 all planes X20 all planes X20 all planes     Ball stretch         Strengthening  Yellow putty squeeze/press/twist Green putty press x20 reps 2#    Flex bar twists x20 Green putty press x20 reps 3#    Wrist bar twists x20 Green putty press x20 reps 4#    Wrist bar twists x20    Flex bar Green putty press x20 reps 5# dumbbell, small dowel    Wrist bar twists x20                                                                          Ther Activity           Grasp/release                                                                  Neuro Re-Ed                                       Ortho Fit                                       Modalities             5' 5' 5' 5' 5'

## 2025-06-06 NOTE — ASSESSMENT & PLAN NOTE
I discussed the information we have thus far, and the likelihood that the patient has a intermediate thickness esophageal cancer.  I discussed that based on his symptoms, it is unlikely that he has a very early stage cancer for which upfront surgery would be offered.  He has an upcoming EUS as well as PET to complete staging.  We discussed that most likely his treatment will be initiated with chemotherapy, with or without radiation, and after a period of treatment he will be reimaged to determine if it is appropriate to move forward with surgery.  We discussed that surgery for esophagus cancer is a major surgery with a relatively high complication rate and that he will need to be in the best shape possible to undergo surgery.  We discussed that to that end, maintaining his nutrition as well as controlling his blood sugar will be very important.  The patient is not interested in meeting with oncology nutrition at this time. He expresses understanding of our discussion.  I will see him again following induction therapy.  All questions answered today of the patient and his wife.

## 2025-06-06 NOTE — ASSESSMENT & PLAN NOTE
Lab Results   Component Value Date    HGBA1C 10.4 (H) 04/06/2025     Elevated BG will increase risks around surgery; this was discussed with pt but he is not interested in initiating insulin and will instead utilize natural methods to control his BG

## 2025-06-08 NOTE — ASSESSMENT & PLAN NOTE
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  The patient presented to the emergency department in mid May 2025 with progressive dysphagia to solids, upper abdominal pain, nausea and vomiting.  Initial pathologic diagnosis of invasive moderately to poorly differentiated adenocarcinoma with signet ring and squamous cell features of the distal esophagus was established on May 19, 2025.  The patient is referred to medical oncology clinic for consideration of multimodality treatment of distal esophageal cancer.    The patient's primary malignancy of the distal esophagus is predominantly a poorly differentiated adenocarcinoma with squamous and signet ring cell features.  Therefore, I recommend nikki-operative chemotherapy with infusional 5-fluorouracil, docetaxel, and oxaliplatin (FLOT) plus minus immunotherapy with the anti-PD-L1 agent durvalumab (Imfinzi).  If durvalumab is not approved by the patient's insurance, he will receive 4 cycles of neoadjuvant FLOT followed by surgical resection of the primary poorly differentiated adenocarcinoma of the distal esophagus and regional lymph node dissection, followed by 4 cycles of adjuvant FLOT, followed by surveillance.  I discussed with the patient clinical benefits and risks of perioperative FLOT.  Clinical benefits outweigh any potential treatment related adverse events or toxicity. Before initiation of perioperative chemotherapy, Mr. Castillo needs to complete clinical staging for his locally advanced invasive, poorly differentiated adenocarcinoma of the distal esophagus.  On Nanci 10, 2025 he will have FDG PET CT scan.  He  is scheduled to have EGD plus EUS on June 24, 2025.    Plan:  FDG PET CT scan on Nanci 10, 2025  EGD plus EUS scheduled for June 24, 2025.  I will request that this test is expedited since the patient needs to initiate definitive treatment for distal esophageal cancer on a timely fashion  Periodic monitoring of serum CEA  Port placement   Provide written educational information and obtain informed consent for FLOT and durvalumab (Imfinzi)  Evaluation by the radiation oncology service scheduled for Nanci 10, 2025  Return to medical oncology clinic for history and physical exam in 2 weeks

## 2025-06-08 NOTE — PROGRESS NOTES
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  The patient presented to the emergency department in mid May 2025 with progressive dysphagia to solids, upper abdominal pain, nausea and vomiting.  Initial pathologic diagnosis of invasive moderately to poorly differentiated adenocarcinoma with signet ring and squamous cell features of the distal esophagus was established on May 19, 2025.  The patient is referred to medical oncology clinic for consideration of multimodality treatment of distal esophageal cancer.    The patient's primary malignancy of the distal esophagus is predominantly a poorly differentiated adenocarcinoma with squamous and signet ring cell features.  Therefore, I recommend nikki-operative chemotherapy with infusional 5-fluorouracil, docetaxel, and oxaliplatin (FLOT) plus minus immunotherapy with the anti-PD-L1 agent durvalumab (Imfinzi).  If durvalumab is not approved by the patient's insurance, he will receive 4 cycles of neoadjuvant FLOT followed by surgical resection of the primary poorly differentiated adenocarcinoma of the distal esophagus and regional lymph node dissection, followed by 4 cycles of adjuvant FLOT, followed by surveillance.  I discussed with the patient clinical benefits and risks of perioperative FLOT.  Clinical benefits outweigh any potential treatment related adverse events  or toxicity. Before initiation of perioperative chemotherapy, Mr. Castillo needs to complete clinical staging for his locally advanced invasive, poorly differentiated adenocarcinoma of the distal esophagus.  On Nanci 10, 2025 he will have FDG PET CT scan.  He is scheduled to have EGD plus EUS on June 24, 2025.    Plan:  FDG PET CT scan on Nanci 10, 2025  EGD plus EUS scheduled for June 24, 2025.  I will request that this test is expedited since the patient needs to initiate definitive treatment for distal esophageal cancer on a timely fashion  Periodic monitoring of serum CEA  Port placement   Provide written educational information and obtain informed consent for FLOT and durvalumab (Imfinzi)  Evaluation by the radiation oncology service scheduled for Nanci 10, 2025  Return to medical oncology clinic for history and physical exam in 2 weeks  Acute deep vein thrombosis (DVT) of femoral vein of left lower extremity (HCC)  The patient has hypercoagulability of malignancy.  On Nanci 3, 2025 he was found to have acute deep vein thrombosis (DVT) of the left femoral vein.  He initiated systemic anticoagulation with rivaroxaban (Xarelto) 20 mg per mouth daily.  I recommend to continue systemic anticoagulation with rivaroxaban 20 mg per mouth daily.  Duration of systemic anticoagulation is a minimum of 6 months.  He may hold systemic anticoagulation 3 days before placement of chemotherapy port.      Mr. Castillo understands and agrees with my management recommendations and plan of care. I answered questions to his satisfaction.  The time spent on this initial outpatient medical oncology consultation was 45 minutes    Orders:    CEA; Future      History of Present Illness   No chief complaint on file.  60-year-old male with multiple and complex medical comorbidities including type 2 diabetes mellitus insulin requiring, stage III chronic kidney disease likely caused by diabetic nephropathy, hypertension, hypercholesterolemia, peripheral  neuropathy, and new diagnosis of moderately to poorly initiated invasive carcinoma with glandular, signet ring cell, and squamous features of the distal esophagus.  The patient presented to the emergency department in mid May 2025 with progressive dysphagia to solids, upper abdominal pain, nausea and vomiting.  Initial pathologic diagnosis of invasive moderately to poorly differentiated carcinoma with signet ring and squamous cell features of the distal esophagus was established on May 19, 2025.  The patient referred to medical oncology clinic for consideration of multidisciplinary multimodality treatment of distal esophageal cancer.    Summary of clinical course - moderately to poorly differentiated invasive carcinoma with glandular, signet ring cell and squamous features of the distal esophagus:  On May 17, 2025, the hub presented to the emergency department with a 2-week history of progressive dysphagia to solids inability to keep any food down, moderate to severe nausea and vomiting as well as mid and upper abdominal pain.  He require inpatient diagnostic workup of progressive dysphagia to solids from 8/17/2025 to May 20, 2025  On May 17, 2025, contrast-enhanced CT scan of the chest, abdomen and pelvis  showed mild wall thickening and hyperenhancement of the distal thoracic esophagus. Ingested material retained within the mid thoracic esophagus findings were suggestive of malignancy of the distal esophagus  On May 19, 2025 EGD showed a single malignant mass in the lower third of the esophagus. Biopsy of the distal esophageal mass with partial removal was performed. Pathology showed moderately to poorly differentiated carcinoma with glandular, signet ring cell, and squamous features.  Immunohistochemistry for mismatch repair proteins showed normal expression consistent with a MMR proficient (MMR-P) esophageal cancer. Butler's esophagus with associated mass was identified. The upper third and middle third of the  esophagus appeared normal.  The mucosa of the cardia, fundus, body, incisura and antrum of the stomach looked edematous, erythematous, granular mucosa. Biopsy of the gastric mucosa showed reactive/chemical changes.  H. pylori testing was negative. The duodenal bulb and 2nd part of the duodenum appeared normal.    Today, the patient refers a 2-month history of progressive dysphagia to solids, occasional to liquids as well as lower chest and upper abdominal pain more noticeable after eating. He denies other GI symptoms such as hematemesis, melena, hematochezia, abdominal distention, changes in bowel habits or jaundice.  He refers fatigue but denies other systemic symptoms such as fever, chills, or diaphoresis.  He denies new cardiorespiratory, genitourinary, musculoskeletal, dermatological, or neurologic symptoms.    Oncology History   Cancer Staging   Esophageal carcinoma (HCC)  Staging form: Esophagus - Adenocarcinoma, AJCC 8th Edition  - Clinical stage from 6/8/2025: Stage Unknown (cTX, cN0, cM0, G3) - Signed by Daryl Novak MD on 6/8/2025  Stage prefix: Initial diagnosis  Total positive nodes: 0  Histologic grading system: 3 grade system  Lymph-vascular invasion (LVI): Presence of LVI unknown/indeterminate  Diagnostic confirmation: Positive histology  Specimen type: Biopsy / Limited Resection  Staged by: Managing physician  Clinical staging modalities: Biopsy, CT  Perineural invasion (PNI): Unknown  Stage used in treatment planning: Yes  National guidelines used in treatment planning: Yes  Oncology History Overview Note   Referred by Pati Mejia for esophageal carcinoma. He originally went to the ER 5/17/25-5/20/25 for dysphagia. Further work up was completed.     PMHx: hypertension,Type 2 Diabetes, CKD stage 3, and skin cancer.     5/17/25 CT CAP   IMPRESSION:   Mild wall thickening and hyperenhancement of the distal thoracic esophagus compatible with esophagitis. Ingested material retained within the mid  thoracic esophagus. Gastroenterology consult recommended    5/19/25 EGD   IMPRESSION:  Single malignant-appearing mass in the lower third of the esophagus; performed cold forceps biopsy with partial removal  C3M4 Butler's esophagus with associated lesion  3 cm hiatal hernia  The upper third of the esophagus and middle third of the esophagus appeared normal.  Edematous, erythematous, granular mucosa in the cardia, fundus of the stomach, body of the stomach, incisura and antrum; performed cold forceps biopsy to rule out H. pylori  The duodenal bulb and 2nd part of the duodenum appeared normal.        RECOMMENDATION:  Await pathology results   Return to floor  Resume diet as tolerated  Continue twice daily PPI  Pending pathology results will need referral to medical and surgical oncology teams        5/19/25 Tissue Exam   A. Stomach, biopsy:  -Gastric antral mucosa with reactive/chemical gastropathy.  -Gastric oxyntic mucosa with no significant histopathologic change.   -Negative for Helicobacter pylori organisms on H&E stain.      B. Esophagogastric junction, mass, biopsy:  -Carcinoma, moderately to poorly differentiated, with glandular, signet ring cell and squamous features.  -MMR has been ordered, results to follow in an addendum.  -HER2 IHC has been ordered, results to follow in an addendum.   6/4/25   Discussed upcoming EUS and PET. Discussed chemotherapy with or with radiation. Than imaging will be ordered after treatment to determine surgery. Discussed surgery for esophageal cancer is high risk, and he will need to maintain nutrition and blood sugar. He declined oncology nutrition at this time.     6/5/25   Discussed chemotherapy with or with out radiation, restaging than surgery. Discussed protein intake. EUS/EGD scheduled, along with PET. Than he will meet with medical and radiation oncology.     6/9/25       6/10/25 PET CT   Upcoming   6/11/25 GI  6/24/25 Endo  7/14/25        Esophageal carcinoma (HCC)   5/19/2025 Biopsy    B. Esophagogastric junction, mass, biopsy:  -Carcinoma, moderately to poorly differentiated, with glandular, signet ring cell and squamous features.       5/30/2025 Initial Diagnosis    Esophageal carcinoma (HCC)     6/8/2025 -  Cancer Staged    Staging form: Esophagus - Adenocarcinoma, AJCC 8th Edition  - Clinical stage from 6/8/2025: Stage Unknown (cTX, cN0, cM0, G3) - Signed by Daryl Novak MD on 6/8/2025  Stage prefix: Initial diagnosis  Total positive nodes: 0  Histologic grading system: 3 grade system  Lymph-vascular invasion (LVI): Presence of LVI unknown/indeterminate  Diagnostic confirmation: Positive histology  Specimen type: Biopsy / Limited Resection  Staged by: Managing physician  Clinical staging modalities: Biopsy, CT  Perineural invasion (PNI): Unknown  Stage used in treatment planning: Yes  National guidelines used in treatment planning: Yes          Pertinent Medical History   Past Medical History[1]  Past Surgical History[2]   Family History[3]   Social History     Socioeconomic History    Marital status: Registered Domestic Partner     Spouse name: Not on file    Number of children: Not on file    Years of education: Not on file    Highest education level: Not on file   Occupational History    Not on file   Tobacco Use    Smoking status: Never    Smokeless tobacco: Never   Vaping Use    Vaping status: Never Used   Substance and Sexual Activity    Alcohol use: Yes     Alcohol/week: 0.0 - 1.0 standard drinks of alcohol     Comment: ocassional    Drug use: Never    Sexual activity: Not Currently     Partners: Female     Birth control/protection: Abstinence   Other Topics Concern    Not on file   Social History Narrative    Not on file     Social Drivers of Health     Financial Resource Strain: Not on file   Food Insecurity: Patient Declined (5/17/2025)    Nursing - Inadequate Food Risk Classification     Worried About Running Out of Food in the  Last Year: Patient declined     Ran Out of Food in the Last Year: Patient declined     Ran Out of Food in the Last Year: Often true   Transportation Needs: No Transportation Needs (2025)    Nursing - Transportation Risk Classification     Lack of Transportation: Not on file     Lack of Transportation: No   Physical Activity: Not on file   Stress: Not on file   Social Connections: Not on file   Intimate Partner Violence: Unknown (2025)    Nursing IPS     Feels Physically and Emotionally Safe: Not on file     Physically Hurt by Someone: Not on file     Humiliated or Emotionally Abused by Someone: Not on file     Physically Hurt by Someone: No     Hurt or Threatened by Someone: No   Housing Stability: Unknown (2025)    Nursing: Inadequate Housing Risk Classification     Has Housing: Not on file     Worried About Losing Housing: Not on file     Unable to Get Utilities: Not on file     Unable to Pay for Housing in the Last Year: No     Has Housin       Review of Systems   Constitutional:  Negative for activity change, appetite change, chills, diaphoresis, fatigue, fever and unexpected weight change.   HENT:  Negative for congestion, dental problem, ear discharge, ear pain, facial swelling, hearing loss, mouth sores, nosebleeds, postnasal drip, rhinorrhea, sinus pain, sneezing, sore throat, tinnitus, trouble swallowing and voice change.    Eyes:  Negative for photophobia, pain, discharge, redness, itching and visual disturbance.   Respiratory:  Negative for apnea, cough, choking, chest tightness, shortness of breath, wheezing and stridor.    Cardiovascular:  Negative for chest pain, palpitations and leg swelling.   Gastrointestinal:  Positive for abdominal distention. Negative for abdominal pain, anal bleeding, blood in stool, constipation, diarrhea, nausea, rectal pain and vomiting.        Difficulty swallowing solids   Endocrine: Negative for cold intolerance and heat intolerance.   Genitourinary:   Negative for decreased urine volume, difficulty urinating, dysuria, enuresis, flank pain, frequency, hematuria and urgency.   Musculoskeletal:  Negative for arthralgias, back pain, gait problem, joint swelling, myalgias, neck pain and neck stiffness.   Skin:  Negative for color change, pallor, rash and wound.   Neurological:  Negative for dizziness, tremors, seizures, syncope, facial asymmetry, speech difficulty, weakness, light-headedness, numbness and headaches.   Hematological:  Negative for adenopathy. Does not bruise/bleed easily.   Psychiatric/Behavioral:  Negative for behavioral problems, confusion, dysphoric mood and sleep disturbance. The patient is not nervous/anxious.            Objective   There were no vitals taken for this visit.    Pain Screening:      ECOG   1    Physical Exam  Constitutional:       Comments: Male patient overweight without acute respiratory distress   HENT:      Head: Normocephalic and atraumatic.      Nose: Nose normal.      Mouth/Throat:      Mouth: Mucous membranes are moist.      Pharynx: Oropharynx is clear.      Comments: No lesions in the oral mucosa    Eyes:      Extraocular Movements: Extraocular movements intact.      Conjunctiva/sclera: Conjunctivae normal.      Pupils: Pupils are equal, round, and reactive to light.      Comments: No scleral icterus   Neck:      Comments: No cervical, supraclavicular, axillary, epitrochlear, or inguinal lymphadenopathy.   Cardiovascular:      Rate and Rhythm: Normal rate and regular rhythm.      Pulses: Normal pulses.      Heart sounds: Normal heart sounds.   Pulmonary:      Effort: Pulmonary effort is normal.      Breath sounds: Normal breath sounds.   Abdominal:      General: Bowel sounds are normal.      Palpations: Abdomen is soft.      Comments: Abdomen enlarged but otherwise soft, nontender, nonpainful.  No hepatomegaly.  No splenomegaly.  No rebound tenderness.  No lateral or shifting dullness.  Bowel sounds present, normal.       Musculoskeletal:         General: Normal range of motion.      Cervical back: Normal range of motion and neck supple.     Skin:     General: Skin is warm and dry.      Capillary Refill: Capillary refill takes less than 2 seconds.     Neurological:      General: No focal deficit present.      Mental Status: He is alert and oriented to person, place, and time. Mental status is at baseline.     Psychiatric:         Mood and Affect: Mood normal.         Behavior: Behavior normal.         Thought Content: Thought content normal.         Judgment: Judgment normal.       Labs: I have reviewed the following labs:  Lab Results   Component Value Date/Time    WBC 4.90 06/03/2025 03:09 PM    RBC 4.59 06/03/2025 03:09 PM    Hemoglobin 14.1 06/03/2025 03:09 PM    Hematocrit 41.5 06/03/2025 03:09 PM    MCV 90 06/03/2025 03:09 PM    MCH 30.7 06/03/2025 03:09 PM    RDW 12.5 06/03/2025 03:09 PM    Platelets 219 06/03/2025 03:09 PM    Segmented % 72 06/03/2025 03:09 PM    Lymphocytes % 14 06/03/2025 03:09 PM    Monocytes % 11 06/03/2025 03:09 PM    Eosinophils Relative 2 06/03/2025 03:09 PM    Basophils Relative 1 06/03/2025 03:09 PM    Immature Grans % 0 06/03/2025 03:09 PM    Absolute Neutrophils 3.50 06/03/2025 03:09 PM     Lab Results   Component Value Date/Time    Potassium 4.6 06/03/2025 03:09 PM    Chloride 98 06/03/2025 03:09 PM    CO2 24 06/03/2025 03:09 PM    CO2, i-STAT 27 10/03/2024 06:45 PM    BUN 33 (H) 06/03/2025 03:09 PM    Creatinine 1.65 (H) 06/03/2025 03:09 PM    Glucose, i-STAT 112 10/03/2024 06:45 PM    Glucose, Fasting 171 (H) 04/12/2025 05:41 AM    Calcium 9.2 06/03/2025 03:09 PM    Corrected Calcium 7.6 (L) 09/12/2024 06:15 AM    AST 12 (L) 05/18/2025 05:12 AM    ALT 13 05/18/2025 05:12 AM    Alkaline Phosphatase 43 05/18/2025 05:12 AM    Total Protein 6.8 05/18/2025 05:12 AM    Albumin 3.8 05/18/2025 05:12 AM    Total Bilirubin 0.39 05/18/2025 05:12 AM    eGFR 43 06/03/2025 03:09 PM                  [1]    Past Medical History:  Diagnosis Date    Allergic 1968    Seasonal    Arthritis 2012    Brain concussion Multiple    Cervical disc disease     Chronic kidney disease 2012    Chronic pain disorder     CTS (carpal tunnel syndrome) 2002    Diabetes mellitus (HCC)     Full dentures     only using upper    Head injury 1975    Headache(784.0) 1980    High blood sugar     Hypertension     Kidney stone     Liver disease     Low back pain     Cannot remember    Lumbar disc disease     Lumbosacral disc disease     Cannot remember    Migraines     Neuropathy in diabetes (HCC)     RLS (restless legs syndrome)     Skin cancer     in past    Thoracic disc disorder     Cannot remember    Visual impairment 2017    Cataracts. Surgery to correct in 2021   [2]   Past Surgical History:  Procedure Laterality Date    AMPUTATION  2022    Little toe left    CARPAL TUNNEL RELEASE  2002    CATARACT EXTRACTION Bilateral     COLONOSCOPY      CONTRACTURE Right 01/20/2025    Procedure: Right wrist and hand extensor tenolysis and wrist capsulotomy;  Surgeon: Carroll Mccarthy MD;  Location: [facility];  Service: Orthopedics    EYE SURGERY  2021    FOOT SURGERY  2022    Left Foot    FRACTURE SURGERY  1968    Right Tib/Fib    GANGLION CYST EXCISION Left 03/25/2024    Procedure: EXCISION MUCOID CYST, INDEX FINGER DIP;  Surgeon: Carroll Mccarthy MD;  [facility];  Service: Orthopedics    GANGLION CYST EXCISION Right 05/20/2024    Procedure: EXCISION MUCOID CYST - RIGHT INDEX AND MIDDLE FINGERS;  Surgeon: Carroll Mccarthy MD;  [facility];  Service: Orthopedics    HERNIA REPAIR      INCISION AND DRAINAGE  01/16/2024    IR NEPHROURETERAL ACCESS FOR UROLOGY PCNL  04/07/2025    KIDNEY SURGERY Right 06/2012    KNEE SURGERY Right 1980    MOUTH SURGERY      DE AMPUTATION METATARSAL W/TOE SINGLE Left 6/1/2022    Procedure: RAY RESECTION FOOT;  Surgeon: Hannah Melgoza DPM;  [facility];  Service: Podiatry    DE  INCISION & DRAINAGE ABSCESS COMPLICATED/MULTIPLE Right 09/17/2024    Procedure: Irrigation and debridement right wrist and hand, any indicated procedures;  Surgeon: Carroll Mccarthy MD;  [facility];  Service: Orthopedics    ND INCISION EXTENSOR TENDON SHEATH WRIST Right 01/20/2025    Procedure: RELEASE DEQUERVAINS - Right;  Surgeon: Carroll Mccarthy MD;  [facility];  Service: Orthopedics    ND PERQ NL/PL LITHOTRP COMPLEX >2 CM MLT LOCATIONS Right 04/11/2025    Procedure: NEPHROLITHOTOMY  PERCUTANEOUS (PCNL);  Surgeon: Adis Hamilton MD;  [facility];  Service: Urology    ND RPR AA HERNIA 1ST < 3 CM REDUCIBLE N/A 7/14/2023    Procedure: REPAIR HERNIA INCISIONAL;  Surgeon: Matt Melo MD;  [facility];  Service: General    ND SYNOVECTOMY EXTENSOR TENDON SHTH WRIST 1 CMPRT Right 10/03/2024    Procedure: Irrigation and debridement, right wrist, volar and dorsal, possible extensor and flexor tenosynovectomy, any indicated procedures;  Surgeon: Carroll Mccarthy MD;  [facility];  Service: Orthopedics    ND SYNOVECTOMY EXTENSOR TENDON SHTH WRIST 1 CMPRT Right 03/24/2025    Procedure: SYNOVECTOMY / DEBRIDEMENT - FLEXOR CARPI RADIALIS AND FLEXOR CARPI ULNARIS TENDONS- RIGHT;  Surgeon: Carroll Mccarthy MD;  [facility];  Service: Orthopedics    TONSILLECTOMY  1968    Yes    WOUND DEBRIDEMENT Left 4/28/2022    Procedure: Left foot washout;  Surgeon: Hannah Melgoza DPM;  [facility];  Service: Podiatry    WOUND DEBRIDEMENT Left 6/6/2022    Procedure: DEBRIDEMENT WOUND (WASH OUT);  Surgeon: Hannah Melgoza DPM;  [facility];  Service: Podiatry   [3]   Family History  Problem Relation Name Age of Onset    Diabetes Paternal Grandmother Michelle     Diabetes Paternal Grandfather Maurice     Arthritis Maternal Grandmother Greatgrandmother  Corie

## 2025-06-09 ENCOUNTER — CONSULT (OUTPATIENT)
Dept: HEMATOLOGY ONCOLOGY | Facility: CLINIC | Age: 60
End: 2025-06-09
Attending: PHYSICIAN ASSISTANT
Payer: MEDICARE

## 2025-06-09 ENCOUNTER — TELEPHONE (OUTPATIENT)
Dept: HEMATOLOGY ONCOLOGY | Facility: CLINIC | Age: 60
End: 2025-06-09

## 2025-06-09 VITALS
BODY MASS INDEX: 38.6 KG/M2 | DIASTOLIC BLOOD PRESSURE: 60 MMHG | WEIGHT: 285 LBS | RESPIRATION RATE: 18 BRPM | HEART RATE: 80 BPM | SYSTOLIC BLOOD PRESSURE: 100 MMHG | TEMPERATURE: 97.5 F | OXYGEN SATURATION: 94 % | HEIGHT: 72 IN

## 2025-06-09 DIAGNOSIS — C15.9 ESOPHAGEAL CARCINOMA (HCC): Primary | ICD-10-CM

## 2025-06-09 DIAGNOSIS — I82.412 ACUTE DEEP VEIN THROMBOSIS (DVT) OF FEMORAL VEIN OF LEFT LOWER EXTREMITY (HCC): ICD-10-CM

## 2025-06-09 PROCEDURE — 99204 OFFICE O/P NEW MOD 45 MIN: CPT | Performed by: INTERNAL MEDICINE

## 2025-06-09 RX ORDER — SODIUM CHLORIDE 9 MG/ML
20 INJECTION, SOLUTION INTRAVENOUS ONCE
OUTPATIENT
Start: 2025-07-09

## 2025-06-09 RX ORDER — DEXTROSE MONOHYDRATE 50 MG/ML
20 INJECTION, SOLUTION INTRAVENOUS ONCE
OUTPATIENT
Start: 2025-06-25

## 2025-06-09 RX ORDER — TRAMADOL HYDROCHLORIDE 50 MG/1
50 TABLET ORAL EVERY 8 HOURS PRN
Qty: 100 TABLET | Refills: 0 | Status: SHIPPED | OUTPATIENT
Start: 2025-06-09

## 2025-06-09 RX ORDER — DEXTROSE MONOHYDRATE 50 MG/ML
20 INJECTION, SOLUTION INTRAVENOUS ONCE
OUTPATIENT
Start: 2025-07-09

## 2025-06-09 RX ORDER — SODIUM CHLORIDE 9 MG/ML
20 INJECTION, SOLUTION INTRAVENOUS ONCE
OUTPATIENT
Start: 2025-06-25

## 2025-06-09 NOTE — LETTER
2025     Jackie Lyons PA-C  [address] [place] [id number]    Patient: Yoni Castillo   YOB: 1965   Date of Visit: 2025       Dear CLARISSA Magallon MD:    Thank you for referring Yoni Castillo to me for evaluation. Below are my notes for this consultation.    If you have questions, please do not hesitate to call me. I look forward to following your patient along with you.         Sincerely,        Daryl Novak MD        CC: MD Daryl Dale MD  2025 12:15 PM  Sign when Signing Visit  Name: Yoni Castillo      : 1965      MRN: [id number]  Encounter Provider: Daryl Novak MD  Encounter Date: 2025   Encounter department: [facility] [place]  :  Assessment & Plan  Esophageal carcinoma (HCC)  60 year-old male with multiple medical comorbidities including type 2 diabetes mellitus insulin requiring, stage III chronic kidney disease (CKD) likely caused by diabetic nephropathy, hypertension, hypercholesterolemia, peripheral neuropathy, and new diagnosis of 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  The patient presented to the emergency department in mid May 2025 with progressive dysphagia to solids, upper abdominal pain, nausea and vomiting.  Initial pathologic diagnosis of invasive moderately to poorly differentiated adenocarcinoma with signet ring and squamous cell features of the distal esophagus was established on May 19, 2025.  The patient is referred to medical oncology clinic for consideration of multimodality treatment of distal esophageal cancer.    The patient's primary malignancy of the distal esophagus is predominantly a poorly differentiated adenocarcinoma with squamous and signet ring cell features.  Therefore, I strongly recommend nikki-operative chemotherapy with  infusional 5-fluorouracil, docetaxel, and oxaliplatin (FLOT) plus minus immunotherapy with the anti-PD-L1 agent durvalumab (Imfinzi).  If durvalumab is not approved by the patient's insurance, he will receive 4 cycles of neoadjuvant FLOT followed by surgical resection of the primary poorly differentiated adenocarcinoma of the distal esophagus and regional lymph node dissection, followed by 4 cycles of adjuvant FLOT, followed by surveillance.  I discussed with the patient clinical benefits and risks of perioperative FLOT.  Clinical benefits of perioperative FLOT outweigh any potential treatment related adverse events or toxicity. Before initiation of perioperative chemotherapy, Mr. Castillo needs complete clinical staging for his locally advanced invasive, poorly differentiated adenocarcinoma of the distal esophagus.  On Nanci 10, 2025 he will have FDG PET CT scan.  He is scheduled to have EGD plus EUS on June 24, 2025.    Plan:  FDG PET CT scan on Nanci 10, 2025  EGD plus EUS scheduled for June 24, 2025.  I will request that this test is expedited since the patient needs to initiate definitive treatment for distal esophageal cancer on a timely fashion  Port placement   Provide written educational information and obtain informed consent for FLOT and durvalumab (Imfinzi)  Evaluation by the radiation oncology service scheduled for Nanci 10, 2025  Return to medical oncology clinic for history and physical exam in 2 weeks  Acute deep vein thrombosis (DVT) of femoral vein of left lower extremity (HCC)  The patient has hypercoagulability of malignancy.  On Nanci 3, 2025 he was found to have acute deep vein thrombosis (DVT) of the left femoral vein.  He initiated systemic anticoagulation with rivaroxaban (Xarelto) 20 mg per mouth daily.  I recommend to continue systemic anticoagulation with rivaroxaban 20 mg per mouth daily.  Duration of systemic anticoagulation is a minimum of 6 months.  He may hold systemic anticoagulation 3 days  before placement of chemotherapy port.      Mr. Castillo understands and agrees with my management recommendations and plan of care. I answered questions to his satisfaction.  The time spent on this initial outpatient medical oncology consultation was 45 minutes           History of Present Illness   No chief complaint on file.  60-year-old male with multiple and complex medical comorbidities including type 2 diabetes mellitus insulin requiring, stage III chronic kidney disease likely caused by diabetic nephropathy, hypertension, hypercholesterolemia, peripheral neuropathy, and new diagnosis of moderately to poorly initiated invasive carcinoma with glandular, signet ring cell, and squamous features of the distal esophagus.  The patient presented to the emergency department in mid May 2025 with progressive dysphagia to solids, upper abdominal pain, nausea and vomiting.  Initial pathologic diagnosis of invasive moderately to poorly differentiated carcinoma with signet ring and squamous cell features of the distal esophagus was established on May 19, 2025.  The patient referred to medical oncology clinic for consideration of multidisciplinary multimodality treatment of distal esophageal cancer.    Summary of clinical course - moderately to poorly differentiated invasive carcinoma with glandular, signet ring cell and squamous features of the distal esophagus:  On May 17, 2025, the hub presented to the emergency department with a 2-week history of progressive dysphagia to solids inability to keep any food down, moderate to severe nausea and vomiting as well as mid and upper abdominal pain.  He require inpatient diagnostic workup of progressive dysphagia to solids from 8/17/2025 to May 20, 2025  On May 17, 2025, contrast-enhanced CT scan of the chest, abdomen and pelvis  showed mild wall thickening and hyperenhancement of the distal thoracic esophagus. Ingested material retained within the mid thoracic esophagus findings were  suggestive of malignancy of the distal esophagus  On May 19, 2025 EGD showed a single malignant mass in the lower third of the esophagus. Biopsy of the distal esophageal mass with partial removal was performed. Pathology showed moderately to poorly differentiated carcinoma with glandular, signet ring cell, and squamous features.  Immunohistochemistry for mismatch repair proteins showed normal expression consistent with a MMR proficient (MMR-P) esophageal cancer. Butler's esophagus with associated mass was identified. The upper third and middle third of the esophagus appeared normal.  The mucosa of the cardia, fundus, body, incisura and antrum of the stomach looked edematous, erythematous, granular mucosa. Biopsy of the gastric mucosa showed reactive/chemical changes.  H. pylori testing was negative. The duodenal bulb and 2nd part of the duodenum appeared normal.    Today, the patient refers a 2-month history of progressive dysphagia to solids, occasional to liquids as well as lower chest and upper abdominal pain more noticeable after eating. He denies other GI symptoms such as hematemesis, melena, hematochezia, abdominal distention, changes in bowel habits or jaundice.  He refers fatigue but denies other systemic symptoms such as fever, chills, or diaphoresis.  He denies new cardiorespiratory, genitourinary, musculoskeletal, dermatological, or neurologic symptoms.    Oncology History  Cancer Staging   Esophageal carcinoma (HCC)  Staging form: Esophagus - Adenocarcinoma, AJCC 8th Edition  - Clinical stage from 6/8/2025: Stage Unknown (cTX, cN0, cM0, G3) - Signed by Daryl Novak MD on 6/8/2025  Stage prefix: Initial diagnosis  Total positive nodes: 0  Histologic grading system: 3 grade system  Lymph-vascular invasion (LVI): Presence of LVI unknown/indeterminate  Diagnostic confirmation: Positive histology  Specimen type: Biopsy / Limited Resection  Staged by: Managing physician  Clinical staging modalities: Biopsy,  CT  Perineural invasion (PNI): Unknown  Stage used in treatment planning: Yes  National guidelines used in treatment planning: Yes  Oncology History Overview Note   Referred by Pati Mejia for esophageal carcinoma. He originally went to the ER 5/17/25-5/20/25 for dysphagia. Further work up was completed.     PMHx: hypertension,Type 2 Diabetes, CKD stage 3, and skin cancer.     5/17/25 CT CAP   IMPRESSION:   Mild wall thickening and hyperenhancement of the distal thoracic esophagus compatible with esophagitis. Ingested material retained within the mid thoracic esophagus. Gastroenterology consult recommended    5/19/25 EGD   IMPRESSION:  Single malignant-appearing mass in the lower third of the esophagus; performed cold forceps biopsy with partial removal  C3M4 Butler's esophagus with associated lesion  3 cm hiatal hernia  The upper third of the esophagus and middle third of the esophagus appeared normal.  Edematous, erythematous, granular mucosa in the cardia, fundus of the stomach, body of the stomach, incisura and antrum; performed cold forceps biopsy to rule out H. pylori  The duodenal bulb and 2nd part of the duodenum appeared normal.        RECOMMENDATION:  Await pathology results   Return to floor  Resume diet as tolerated  Continue twice daily PPI  Pending pathology results will need referral to medical and surgical oncology teams        5/19/25 Tissue Exam   A. Stomach, biopsy:  -Gastric antral mucosa with reactive/chemical gastropathy.  -Gastric oxyntic mucosa with no significant histopathologic change.   -Negative for Helicobacter pylori organisms on H&E stain.      B. Esophagogastric junction, mass, biopsy:  -Carcinoma, moderately to poorly differentiated, with glandular, signet ring cell and squamous features.  -MMR has been ordered, results to follow in an addendum.  -HER2 IHC has been ordered, results to follow in an addendum.   6/4/25   Discussed upcoming EUS and PET. Discussed  chemotherapy with or with radiation. Than imaging will be ordered after treatment to determine surgery. Discussed surgery for esophageal cancer is high risk, and he will need to maintain nutrition and blood sugar. He declined oncology nutrition at this time.     6/5/25   Discussed chemotherapy with or with out radiation, restaging than surgery. Discussed protein intake. EUS/EGD scheduled, along with PET. Than he will meet with medical and radiation oncology.     6/9/25       6/10/25 PET CT   Upcoming   6/11/25 GI  6/24/25 Endo  7/14/25       Esophageal carcinoma (HCC)   5/19/2025 Biopsy    B. Esophagogastric junction, mass, biopsy:  -Carcinoma, moderately to poorly differentiated, with glandular, signet ring cell and squamous features.       5/30/2025 Initial Diagnosis    Esophageal carcinoma (HCC)     6/8/2025 -  Cancer Staged    Staging form: Esophagus - Adenocarcinoma, AJCC 8th Edition  - Clinical stage from 6/8/2025: Stage Unknown (cTX, cN0, cM0, G3) - Signed by Daryl Novak MD on 6/8/2025  Stage prefix: Initial diagnosis  Total positive nodes: 0  Histologic grading system: 3 grade system  Lymph-vascular invasion (LVI): Presence of LVI unknown/indeterminate  Diagnostic confirmation: Positive histology  Specimen type: Biopsy / Limited Resection  Staged by: Managing physician  Clinical staging modalities: Biopsy, CT  Perineural invasion (PNI): Unknown  Stage used in treatment planning: Yes  National guidelines used in treatment planning: Yes          Pertinent Medical History   Past Medical History[1]  Past Surgical History[2]   Family History[3]   Social History     Socioeconomic History   • Marital status: Registered Domestic Partner     Spouse name: Not on file   • Number of children: Not on file   • Years of education: Not on file   • Highest education level: Not on file   Occupational History   • Not on file   Tobacco Use   • Smoking status: Never   • Smokeless tobacco: Never   Vaping  Use   • Vaping status: Never Used   Substance and Sexual Activity   • Alcohol use: Yes     Alcohol/week: 0.0 - 1.0 standard drinks of alcohol     Comment: ocassional   • Drug use: Never   • Sexual activity: Not Currently     Partners: Female     Birth control/protection: Abstinence   Other Topics Concern   • Not on file   Social History Narrative   • Not on file     Social Drivers of Health     Financial Resource Strain: Not on file   Food Insecurity: Patient Declined (2025)    Nursing - Inadequate Food Risk Classification    • Worried About Running Out of Food in the Last Year: Patient declined    • Ran Out of Food in the Last Year: Patient declined    • Ran Out of Food in the Last Year: Often true   Transportation Needs: No Transportation Needs (2025)    Nursing - Transportation Risk Classification    • Lack of Transportation: Not on file    • Lack of Transportation: No   Physical Activity: Not on file   Stress: Not on file   Social Connections: Not on file   Intimate Partner Violence: Unknown (2025)    Nursing IPS    • Feels Physically and Emotionally Safe: Not on file    • Physically Hurt by Someone: Not on file    • Humiliated or Emotionally Abused by Someone: Not on file    • Physically Hurt by Someone: No    • Hurt or Threatened by Someone: No   Housing Stability: Unknown (2025)    Nursing: Inadequate Housing Risk Classification    • Has Housing: Not on file    • Worried About Losing Housing: Not on file    • Unable to Get Utilities: Not on file    • Unable to Pay for Housing in the Last Year: No    • Has Housin       Review of Systems   Constitutional:  Negative for activity change, appetite change, chills, diaphoresis, fatigue, fever and unexpected weight change.   HENT:  Negative for congestion, dental problem, ear discharge, ear pain, facial swelling, hearing loss, mouth sores, nosebleeds, postnasal drip, rhinorrhea, sinus pain, sneezing, sore throat, tinnitus, trouble swallowing  and voice change.    Eyes:  Negative for photophobia, pain, discharge, redness, itching and visual disturbance.   Respiratory:  Negative for apnea, cough, choking, chest tightness, shortness of breath, wheezing and stridor.    Cardiovascular:  Negative for chest pain, palpitations and leg swelling.   Gastrointestinal:  Positive for abdominal distention. Negative for abdominal pain, anal bleeding, blood in stool, constipation, diarrhea, nausea, rectal pain and vomiting.        Difficulty swallowing solids   Endocrine: Negative for cold intolerance and heat intolerance.   Genitourinary:  Negative for decreased urine volume, difficulty urinating, dysuria, enuresis, flank pain, frequency, hematuria and urgency.   Musculoskeletal:  Negative for arthralgias, back pain, gait problem, joint swelling, myalgias, neck pain and neck stiffness.   Skin:  Negative for color change, pallor, rash and wound.   Neurological:  Negative for dizziness, tremors, seizures, syncope, facial asymmetry, speech difficulty, weakness, light-headedness, numbness and headaches.   Hematological:  Negative for adenopathy. Does not bruise/bleed easily.   Psychiatric/Behavioral:  Negative for behavioral problems, confusion, dysphoric mood and sleep disturbance. The patient is not nervous/anxious.          Objective   There were no vitals taken for this visit.    Pain Screening:      ECOG   1    Physical Exam  Constitutional:       Comments: Male patient overweight without acute respiratory distress   HENT:      Head: Normocephalic and atraumatic.      Nose: Nose normal.      Mouth/Throat:      Mouth: Mucous membranes are moist.      Pharynx: Oropharynx is clear.      Comments: No lesions in the oral mucosa    Eyes:      Extraocular Movements: Extraocular movements intact.      Conjunctiva/sclera: Conjunctivae normal.      Pupils: Pupils are equal, round, and reactive to light.      Comments: No scleral icterus   Neck:      Comments: No cervical,  supraclavicular, axillary, epitrochlear, or inguinal lymphadenopathy.   Cardiovascular:      Rate and Rhythm: Normal rate and regular rhythm.      Pulses: Normal pulses.      Heart sounds: Normal heart sounds.   Pulmonary:      Effort: Pulmonary effort is normal.      Breath sounds: Normal breath sounds.   Abdominal:      General: Bowel sounds are normal.      Palpations: Abdomen is soft.      Comments: Abdomen enlarged but otherwise soft, nontender, nonpainful.  No hepatomegaly.  No splenomegaly.  No rebound tenderness.  No lateral or shifting dullness.  Bowel sounds present, normal.      Musculoskeletal:         General: Normal range of motion.      Cervical back: Normal range of motion and neck supple.     Skin:     General: Skin is warm and dry.      Capillary Refill: Capillary refill takes less than 2 seconds.     Neurological:      General: No focal deficit present.      Mental Status: He is alert and oriented to person, place, and time. Mental status is at baseline.     Psychiatric:         Mood and Affect: Mood normal.         Behavior: Behavior normal.         Thought Content: Thought content normal.         Judgment: Judgment normal.       Labs: I have reviewed the following labs:  Lab Results   Component Value Date/Time    WBC 4.90 06/03/2025 03:09 PM    RBC 4.59 06/03/2025 03:09 PM    Hemoglobin 14.1 06/03/2025 03:09 PM    Hematocrit 41.5 06/03/2025 03:09 PM    MCV 90 06/03/2025 03:09 PM    MCH 30.7 06/03/2025 03:09 PM    RDW 12.5 06/03/2025 03:09 PM    Platelets 219 06/03/2025 03:09 PM    Segmented % 72 06/03/2025 03:09 PM    Lymphocytes % 14 06/03/2025 03:09 PM    Monocytes % 11 06/03/2025 03:09 PM    Eosinophils Relative 2 06/03/2025 03:09 PM    Basophils Relative 1 06/03/2025 03:09 PM    Immature Grans % 0 06/03/2025 03:09 PM    Absolute Neutrophils 3.50 06/03/2025 03:09 PM     Lab Results   Component Value Date/Time    Potassium 4.6 06/03/2025 03:09 PM    Chloride 98 06/03/2025 03:09 PM    CO2 24  06/03/2025 03:09 PM    CO2, i-STAT 27 10/03/2024 06:45 PM    BUN 33 (H) 06/03/2025 03:09 PM    Creatinine 1.65 (H) 06/03/2025 03:09 PM    Glucose, i-STAT 112 10/03/2024 06:45 PM    Glucose, Fasting 171 (H) 04/12/2025 05:41 AM    Calcium 9.2 06/03/2025 03:09 PM    Corrected Calcium 7.6 (L) 09/12/2024 06:15 AM    AST 12 (L) 05/18/2025 05:12 AM    ALT 13 05/18/2025 05:12 AM    Alkaline Phosphatase 43 05/18/2025 05:12 AM    Total Protein 6.8 05/18/2025 05:12 AM    Albumin 3.8 05/18/2025 05:12 AM    Total Bilirubin 0.39 05/18/2025 05:12 AM    eGFR 43 06/03/2025 03:09 PM                  [1]   Past Medical History:  Diagnosis Date   • Allergic 1968    Seasonal   • Arthritis 2012   • Brain concussion Multiple   • Cervical disc disease    • Chronic kidney disease 2012   • Chronic pain disorder    • CTS (carpal tunnel syndrome) 2002   • Diabetes mellitus (HCC)    • Full dentures     only using upper   • Head injury 1975   • Headache(784.0) 1980   • High blood sugar    • Hypertension    • Kidney stone    • Liver disease    • Low back pain     Cannot remember   • Lumbar disc disease    • Lumbosacral disc disease     Cannot remember   • Migraines    • Neuropathy in diabetes (HCC)    • RLS (restless legs syndrome)    • Skin cancer     in past   • Thoracic disc disorder     Cannot remember   • Visual impairment 2017    Cataracts. Surgery to correct in 2021   [2]   Past Surgical History:  Procedure Laterality Date   • AMPUTATION  2022    Little toe left   • CARPAL TUNNEL RELEASE  2002   • CATARACT EXTRACTION Bilateral    • COLONOSCOPY     • CONTRACTURE Right 01/20/2025    Procedure: Right wrist and hand extensor tenolysis and wrist capsulotomy;  Surgeon: Carroll Mccarthy MD;  [facility];  Service: Orthopedics   • EYE SURGERY  2021   • FOOT SURGERY  2022    Left Foot   • FRACTURE SURGERY  1968    Right Tib/Fib   • GANGLION CYST EXCISION Left 03/25/2024    Procedure: EXCISION MUCOID CYST, INDEX FINGER DIP;  Surgeon:  Carroll Mccarthy MD;  [facility];  Service: Orthopedics   • GANGLION CYST EXCISION Right 05/20/2024    Procedure: EXCISION MUCOID CYST - RIGHT INDEX AND MIDDLE FINGERS;  Surgeon: Carroll Mccarthy MD;  [facility];  Service: Orthopedics   • HERNIA REPAIR     • INCISION AND DRAINAGE  01/16/2024   • IR NEPHROURETERAL ACCESS FOR UROLOGY PCNL  04/07/2025   • KIDNEY SURGERY Right 06/2012   • KNEE SURGERY Right 1980   • MOUTH SURGERY     • MA AMPUTATION METATARSAL W/TOE SINGLE Left 6/1/2022    Procedure: RAY RESECTION FOOT;  Surgeon: Hannah Melgoza DPM;  [facility];  Service: Podiatry   • MA INCISION & DRAINAGE ABSCESS COMPLICATED/MULTIPLE Right 09/17/2024    Procedure: Irrigation and debridement right wrist and hand, any indicated procedures;  Surgeon: Carroll Mccarthy MD;  [facility];  Service: Orthopedics   • MA INCISION EXTENSOR TENDON SHEATH WRIST Right 01/20/2025    Procedure: RELEASE DEQUERVAINS - Right;  Surgeon: Carroll Mccarthy MD;  [facility];  Service: Orthopedics   • MA PERQ NL/PL LITHOTRP COMPLEX >2 CM MLT LOCATIONS Right 04/11/2025    Procedure: NEPHROLITHOTOMY  PERCUTANEOUS (PCNL);  Surgeon: Adis Hamilton MD;  [facility];  Service: Urology   • MA RPR AA HERNIA 1ST < 3 CM REDUCIBLE N/A 7/14/2023    Procedure: REPAIR HERNIA INCISIONAL;  Surgeon: Matt Melo MD;  [facility];  Service: General   • MA SYNOVECTOMY EXTENSOR TENDON SHTH WRIST 1 CMPRT Right 10/03/2024    Procedure: Irrigation and debridement, right wrist, volar and dorsal, possible extensor and flexor tenosynovectomy, any indicated procedures;  Surgeon: Carroll Mccarthy MD;  [facility];  Service: Orthopedics   • MA SYNOVECTOMY EXTENSOR TENDON SHTH WRIST 1 CMPRT Right 03/24/2025    Procedure: SYNOVECTOMY / DEBRIDEMENT - FLEXOR CARPI RADIALIS AND FLEXOR CARPI ULNARIS TENDONS- RIGHT;  Surgeon: Carroll Mccarthy MD;  [facility];  Service: Orthopedics   • TONSILLECTOMY  1968    Yes   • WOUND DEBRIDEMENT Left 4/28/2022    Procedure: Left foot washout;  Surgeon: Hannah Melgoza DPM;  [facility];  Service: Podiatry   • WOUND DEBRIDEMENT Left 6/6/2022    Procedure: DEBRIDEMENT WOUND (WASH OUT);  Surgeon: Hannah Melgoza DPM;  [facility];  Service: Podiatry   [3]   Family History  Problem Relation Name Age of Onset   • Diabetes Paternal Grandmother Michelle    • Diabetes Paternal Grandfather Maurice    • Arthritis Maternal Grandmother Alfredogranbernabe Fontenot       Statement Selected

## 2025-06-10 ENCOUNTER — PATIENT OUTREACH (OUTPATIENT)
Dept: HEMATOLOGY ONCOLOGY | Facility: CLINIC | Age: 60
End: 2025-06-10

## 2025-06-10 ENCOUNTER — DOCUMENTATION (OUTPATIENT)
Dept: HEMATOLOGY ONCOLOGY | Facility: CLINIC | Age: 60
End: 2025-06-10

## 2025-06-10 ENCOUNTER — CONSULT (OUTPATIENT)
Dept: RADIATION ONCOLOGY | Facility: CLINIC | Age: 60
End: 2025-06-10
Attending: PHYSICIAN ASSISTANT
Payer: MEDICARE

## 2025-06-10 ENCOUNTER — PATIENT OUTREACH (OUTPATIENT)
Dept: CASE MANAGEMENT | Facility: OTHER | Age: 60
End: 2025-06-10

## 2025-06-10 ENCOUNTER — TELEPHONE (OUTPATIENT)
Dept: RADIOLOGY | Facility: HOSPITAL | Age: 60
End: 2025-06-10

## 2025-06-10 ENCOUNTER — DOCUMENTATION (OUTPATIENT)
Dept: OTHER | Facility: HOSPITAL | Age: 60
End: 2025-06-10

## 2025-06-10 VITALS
HEART RATE: 78 BPM | BODY MASS INDEX: 38.6 KG/M2 | TEMPERATURE: 97.2 F | WEIGHT: 285 LBS | SYSTOLIC BLOOD PRESSURE: 122 MMHG | DIASTOLIC BLOOD PRESSURE: 80 MMHG | RESPIRATION RATE: 18 BRPM | HEIGHT: 72 IN | OXYGEN SATURATION: 95 %

## 2025-06-10 DIAGNOSIS — C15.9 ESOPHAGEAL CARCINOMA (HCC): Primary | ICD-10-CM

## 2025-06-10 DIAGNOSIS — Z95.828 PORT-A-CATH IN PLACE: ICD-10-CM

## 2025-06-10 DIAGNOSIS — C15.5 MALIGNANT NEOPLASM OF LOWER THIRD OF ESOPHAGUS (HCC): Primary | ICD-10-CM

## 2025-06-10 DIAGNOSIS — R11.0 NAUSEA: ICD-10-CM

## 2025-06-10 PROCEDURE — 99204 OFFICE O/P NEW MOD 45 MIN: CPT | Performed by: RADIOLOGY

## 2025-06-10 PROCEDURE — 99211 OFF/OP EST MAY X REQ PHY/QHP: CPT | Performed by: RADIOLOGY

## 2025-06-10 RX ORDER — SODIUM CHLORIDE 9 MG/ML
30 INJECTION, SOLUTION INTRAVENOUS CONTINUOUS
Status: CANCELLED | OUTPATIENT
Start: 2025-06-10

## 2025-06-10 NOTE — PRE-PROCEDURE INSTRUCTIONS
Pre-procedure Instructions for Interventional Radiology  [address]   [address]  INTERVENTIONAL RADIOLOGY [phone]    You are scheduled for a/an port placement.    On Monday 6-16-25.    Your arrival time is 7:30am.  Our Interventional Radiology nurse will notify you a few days before your procedure with the exact arrival time and location to arrive.    To prepare for your procedure:  Please arrange for someone to drive you home after the procedure and stay with you until the next morning if you are instructed to do so.  This is typically for patients receiving some type of sedative or anesthetic for the procedure.  DO NOT EAT OR DRINK ANYTHING after midnight on the evening before your procedure including candy & gum.  ONLY SIPS OF WATER with your medications are allowed on the morning of your procedure.  TAKE ALL OF YOUR REGULAR MEDICATIONS THE MORNING OF YOUR PROCEDURE with sips of water!  We may call you to stop some of your blood sugar, blood pressure and blood thinning medications depending on the procedure.  Please take all of these medications unless we instruct you to stop them.  If you have an allergy to x-ray dye, please contact Interventional Radiology for an x-ray dye preparation which usually consists of an oral steroid and Benadryl.  If you wear a Glucose Monitor, you may be asked to remove it for your procedure if we are using x-ray.  These devices need to be removed when we are imaging with x-ray near the device since the radiation can cause the unit to malfunction.  If possible and not too inconvenient, you may want to schedule your exam closer to day 14 of your 14 day device so your device is not wasted.    The day of your procedure:  Bring a list of the medications you take at home.  Bring medications you take for breathing problems (such as inhalers), medications for chest pain, or both.  Bring your insurance card and a form of photo ID.  Bring a case  for your glasses or contacts.  Please leave all valuables such as credit cards and jewelry at home.  Report to the registration desk in the main lobby at the [facility].  Ask to be directed to the After Procedure Unit on the 2nd floor.  While your procedure is being performed your family may wait in the Waiting Room on the 2nd floor.  Be prepared to stay overnight just in case. Sometimes procedures will indicate the need for further observation or treatment.   If you are scheduled for a follow-up visit with the Interventional Radiologist after your procedure, you will be called with a date and time.    Special Instructions (Medications to alter or stop taking before your procedure etc.):  Hold Xarelto for 2 days before the procedure.  No aspirin products for 5 days before the procedure.

## 2025-06-10 NOTE — PROGRESS NOTES
Late entry- Patient education completed 6/9/2025    Oncology Teach:   Review of Plan:  Diagnosis Reviewed, Treatment Plan Reviewed and Treatment Schedule Reviewed    Review of Pre-Treatment Needs:  Lab work frequency reviewed    PICC or Port Placement Needs:  Port needed, reviewed w/ pt    Expectations at First Appointment Reviewed:  Yes    Patient Medication Education Reviewed:  Yes    Supportive Medication Reviewed:  Confirmed Rx sent to pharmacy    Review when to call office vs. present to ED:  Yes and Magnet provided    Provided Oncology Access Center Number:  Yes    Assessment of patient understanding:  Pt demonstrates understanding    Chemo consent obtained?:  Yes

## 2025-06-10 NOTE — PROGRESS NOTES
Yoni VALENCIA [facility] 1965 is a 60 y.o. male Referred by Pati Mejia for esophageal carcinoma. He originally went to the ER 5/17/25-5/20/25 for dysphagia. Further work up was completed.     PMHx: hypertension,Type 2 Diabetes, CKD stage 3, and skin cancer.     5/17/25 CT CAP   IMPRESSION:   Mild wall thickening and hyperenhancement of the distal thoracic esophagus compatible with esophagitis. Ingested material retained within the mid thoracic esophagus. Gastroenterology consult recommended    5/19/25 EGD   IMPRESSION:  Single malignant-appearing mass in the lower third of the esophagus; performed cold forceps biopsy with partial removal  C3M4 Butler's esophagus with associated lesion  3 cm hiatal hernia  The upper third of the esophagus and middle third of the esophagus appeared normal.  Edematous, erythematous, granular mucosa in the cardia, fundus of the stomach, body of the stomach, incisura and antrum; performed cold forceps biopsy to rule out H. pylori  The duodenal bulb and 2nd part of the duodenum appeared normal.        RECOMMENDATION:  Await pathology results   Return to floor  Resume diet as tolerated  Continue twice daily PPI  Pending pathology results will need referral to medical and surgical oncology teams        5/19/25 Tissue Exam   A. Stomach, biopsy:  -Gastric antral mucosa with reactive/chemical gastropathy.  -Gastric oxyntic mucosa with no significant histopathologic change.   -Negative for Helicobacter pylori organisms on H&E stain.      B. Esophagogastric junction, mass, biopsy:  -Carcinoma, moderately to poorly differentiated, with glandular, signet ring cell and squamous features.  -MMR has been ordered, results to follow in an addendum.  -HER2 IHC has been ordered, results to follow in an addendum.   6/4/25   Discussed upcoming EUS and PET. Discussed chemotherapy with or with radiation. Than imaging will be ordered after treatment to determine surgery. Discussed surgery for  esophageal cancer is high risk, and he will need to maintain nutrition and blood sugar. He declined oncology nutrition at this time.     6/5/25   Discussed chemotherapy with or with out radiation, restaging than surgery. Discussed protein intake. EUS/EGD scheduled, along with PET. Than he will meet with medical and radiation oncology.     6/9/25   Recommend nikki-operative chemotherapy with infusional 5-fluorouracil, docetaxel, and oxaliplatin (FLOT) plus minus immunotherapy with the anti-PD-L1 agent durvalumab (Imfinzi).  If durvalumab is not approved by the patient's insurance, he will receive 4 cycles of neoadjuvant FLOT followed by surgical resection of the primary poorly differentiated adenocarcinoma of the distal esophagus and regional lymph node dissection, followed by 4 cycles of adjuvant FLOT, followed by surveillance.           Upcoming   6/11/25 GI  6/24/25 Endo  7/14/25 US     Oncology History   Esophageal carcinoma (HCC)   5/19/2025 Biopsy    B. Esophagogastric junction, mass, biopsy:  -Carcinoma, moderately to poorly differentiated, with glandular, signet ring cell and squamous features.       5/30/2025 Initial Diagnosis    Esophageal carcinoma (HCC)     6/8/2025 -  Cancer Staged    Staging form: Esophagus - Adenocarcinoma, AJCC 8th Edition  - Clinical stage from 6/8/2025: Stage Unknown (cTX, cN0, cM0, G3) - Signed by Daryl Novak MD on 6/8/2025  Stage prefix: Initial diagnosis  Total positive nodes: 0  Histologic grading system: 3 grade system  Lymph-vascular invasion (LVI): Presence of LVI unknown/indeterminate  Diagnostic confirmation: Positive histology  Specimen type: Biopsy / Limited Resection  Staged by: Managing physician  Clinical staging modalities: Biopsy, CT  Perineural invasion (PNI): Unknown  Stage used in treatment planning: Yes  National guidelines used in treatment planning: Yes       6/25/2025 -  Chemotherapy    durvalumab (IMFINZI) IVPB, 1,500 mg (100 % of  original dose 1,500 mg), 0 of 1 cycle  Dose modification: 1,500 mg (original dose 1,500 mg, Cycle 1)  DOCEtaxel (TAXOTERE) chemo infusion, 50 mg/m2 = 123.6 mg, 0 of 4 cycles  leucovorin calcium IVPB, 200 mg/m2 = 494 mg, 0 of 4 cycles  oxaliplatin (ELOXATIN) chemo infusion, 85 mg/m2 = 209.95 mg, 0 of 4 cycles  fluorouracil (ADRUCIL) ambulatory infusion Soln, 2,600 mg/m2/day = 6,420 mg, 0 of 4 cycles         Review of Systems:  Review of Systems   Constitutional:  Positive for activity change, appetite change, chills, fatigue and unexpected weight change.   HENT:  Positive for trouble swallowing.    Eyes: Negative.         Wears glasses   Respiratory:  Positive for cough.    Cardiovascular: Negative.    Gastrointestinal:  Positive for nausea.   Endocrine: Positive for cold intolerance.   Genitourinary: Negative.    Musculoskeletal:  Positive for back pain and neck pain.   Skin: Negative.    Neurological:  Positive for dizziness (intermittent) and headaches.   Hematological: Negative.    Psychiatric/Behavioral: Negative.         Clinical Trial: no    Prior Radiation none    Teaching NCI pamphlet, SIM, side effects    MST completed    Implantable Devices (Port, pacemaker, pain stimulator) receiving port on 6/16/25    Hip Replacement none    Health Maintenance   Topic Date Due    COVID-19 Vaccine (1) Never done    Pneumococcal Vaccine: 50+ Years (1 of 2 - PCV) Never done    Colorectal Cancer Screening  Never done    Zoster Vaccine (1 of 2) Never done    RSV Vaccine for Pregnant Patients and Patients Age 60+ Years (1 - Risk 60-74 years 1-dose series) Never done    BMI: Followup Plan  03/23/2024    Diabetic Eye Exam  11/15/2024    OT PLAN OF CARE  05/31/2025    Influenza Vaccine (Season Ended) 09/01/2025    HIV Screening  04/08/2026 (Originally 2/11/1980)    HEMOGLOBIN A1C  10/06/2025    Medicare Annual Wellness Visit (AWV)  11/13/2025    Diabetic Foot Exam  02/21/2026    Depression Screening  05/13/2026    BMI: Adult   06/09/2026    Hepatitis C Screening  Completed    Meningococcal B Vaccine  Aged Out    RSV Vaccine age 0-20 Months  Aged Out    HIB Vaccine  Aged Out    IPV Vaccine  Aged Out    Hepatitis A Vaccine  Aged Out    Meningococcal ACWY Vaccine  Aged Out    HPV Vaccine  Aged Out       Past Medical History[1]    Past Surgical History[2]    Family History[3]    Social History[4]     Current Medications[5]    Allergies[6]     Vitals:    06/10/25 1253   TempSrc: Temporal   Weight: 129 kg (285 lb)   Height: 6' (1.829 m)               [1]   Past Medical History:  Diagnosis Date    Allergic 1968    Seasonal    Arthritis 2012    Brain concussion Multiple    Cervical disc disease     Chronic kidney disease 2012    Chronic pain disorder     CTS (carpal tunnel syndrome) 2002    Diabetes mellitus (HCC)     Full dentures     only using upper    Head injury 1975    Headache(784.0) 1980    High blood sugar     Hypertension     Kidney stone     Liver disease     Low back pain     Cannot remember    Lumbar disc disease     Lumbosacral disc disease     Cannot remember    Migraines     Neuropathy in diabetes (HCC)     RLS (restless legs syndrome)     Skin cancer     in past    Thoracic disc disorder     Cannot remember    Visual impairment 2017    Cataracts. Surgery to correct in 2021   [2]   Past Surgical History:  Procedure Laterality Date    AMPUTATION  2022    Little toe left    CARPAL TUNNEL RELEASE  2002    CATARACT EXTRACTION Bilateral     COLONOSCOPY      CONTRACTURE Right 01/20/2025    Procedure: Right wrist and hand extensor tenolysis and wrist capsulotomy;  Surgeon: Carroll Mccarthy MD;  [facility];  Service: Orthopedics    EYE SURGERY  2021    FOOT SURGERY  2022    Left Foot    FRACTURE SURGERY  1968    Right Tib/Fib    GANGLION CYST EXCISION Left 03/25/2024    Procedure: EXCISION MUCOID CYST, INDEX FINGER DIP;  Surgeon: Carroll Mccarthy MD;  [facility];  Service: Orthopedics    GANGLION CYST  EXCISION Right 05/20/2024    Procedure: EXCISION MUCOID CYST - RIGHT INDEX AND MIDDLE FINGERS;  Surgeon: Carroll Mccarthy MD;  [facility];  Service: Orthopedics    HERNIA REPAIR      INCISION AND DRAINAGE  01/16/2024    IR NEPHROURETERAL ACCESS FOR UROLOGY PCNL  04/07/2025    KIDNEY SURGERY Right 06/2012    KNEE SURGERY Right 1980    MOUTH SURGERY      AL AMPUTATION METATARSAL W/TOE SINGLE Left 6/1/2022    Procedure: RAY RESECTION FOOT;  Surgeon: Hannah Melgoza DPM;  [facility];  Service: Podiatry    AL INCISION & DRAINAGE ABSCESS COMPLICATED/MULTIPLE Right 09/17/2024    Procedure: Irrigation and debridement right wrist and hand, any indicated procedures;  Surgeon: Carroll Mccarthy MD;  [facility];  Service: Orthopedics    AL INCISION EXTENSOR TENDON SHEATH WRIST Right 01/20/2025    Procedure: RELEASE DEQUERVAINS - Right;  Surgeon: Carroll Mccarthy MD;  [facility];  Service: Orthopedics    AL PERQ NL/PL LITHOTRP COMPLEX >2 CM MLT LOCATIONS Right 04/11/2025    Procedure: NEPHROLITHOTOMY  PERCUTANEOUS (PCNL);  Surgeon: Adis Hamilton MD;  [facility];  Service: Urology    AL RPR AA HERNIA 1ST < 3 CM REDUCIBLE N/A 7/14/2023    Procedure: REPAIR HERNIA INCISIONAL;  Surgeon: Matt Melo MD;  [facility];  Service: General    AL SYNOVECTOMY EXTENSOR TENDON SHTH WRIST 1 CMPRT Right 10/03/2024    Procedure: Irrigation and debridement, right wrist, volar and dorsal, possible extensor and flexor tenosynovectomy, any indicated procedures;  Surgeon: Carroll Mccarthy MD;  [facility];  Service: Orthopedics    AL SYNOVECTOMY EXTENSOR TENDON SHTH WRIST 1 CMPRT Right 03/24/2025    Procedure: SYNOVECTOMY / DEBRIDEMENT - FLEXOR CARPI RADIALIS AND FLEXOR CARPI ULNARIS TENDONS- RIGHT;  Surgeon: Carroll Mccarthy MD;  [facility];  Service: Orthopedics    TONSILLECTOMY  1968    Yes    WOUND DEBRIDEMENT Left 4/28/2022     Procedure: Left foot washout;  Surgeon: Hannah Melgoza DPM;  [facility];  Service: Podiatry    WOUND DEBRIDEMENT Left 6/6/2022    Procedure: DEBRIDEMENT WOUND (WASH OUT);  Surgeon: Hannah Melgoza DPM;  [facility];  Service: Podiatry   [3]   Family History  Problem Relation Name Age of Onset    Diabetes Paternal Grandmother Michelle     Diabetes Paternal Grandfather Maurice     Arthritis Maternal Grandmother Alfredograndmnorman Fontenot    [4]   Social History  Tobacco Use    Smoking status: Never    Smokeless tobacco: Never   Vaping Use    Vaping status: Never Used   Substance Use Topics    Alcohol use: Yes     Alcohol/week: 0.0 - 1.0 standard drinks of alcohol     Comment: ocassional    Drug use: Never   [5]   Current Outpatient Medications:     Accu-Chek FastClix Lancets MISC, Use to test blood sugar once a day (Patient not taking: Reported on 5/6/2025), Disp: 102 each, Rfl: 0    acetaminophen (TYLENOL) 500 mg tablet, Take 2 tablets (1,000 mg total) by mouth every 8 (eight) hours, Disp: 60 tablet, Rfl: 0    amitriptyline (ELAVIL) 10 mg tablet, 30mg at bedtime, Disp: 90 tablet, Rfl: 5    ammonium lactate (LAC-HYDRIN) 12 % lotion, as needed in the morning and as needed in the evening., Disp: , Rfl:     Blood Glucose Monitoring Suppl (Accu-Chek Guide Me) w/Device KIT, Use to check blood sugar once a day (Patient not taking: Reported on 5/6/2025), Disp: 1 kit, Rfl: 0    carvedilol (COREG) 25 mg tablet, Take 1 tablet (25 mg total) by mouth 2 (two) times a day with meals, Disp: 200 tablet, Rfl: 1    Empagliflozin (Jardiance) 25 MG TABS, Take 1 tablet (25 mg total) by mouth daily, Disp: 90 tablet, Rfl: 1    glucose blood (Accu-Chek Guide) test strip, Use to check blood sugar once a day, Disp: 100 strip, Rfl: 0    hydroCHLOROthiazide 12.5 mg tablet, Take 1 tablet (12.5 mg total) by mouth 2 (two) times a day, Disp: 100 tablet, Rfl: 3    Insulin Pen Needle (BD Pen Needle [place] U/F) 32G X 4 MM MISC, Use in the  morning (Patient not taking: Reported on 5/17/2025), Disp: 100 each, Rfl: 1    ketoconazole (NIZORAL) 2 % cream, if needed, Disp: , Rfl:     lidocaine (Lidoderm) 5 %, Apply 1 patch topically over 12 hours daily Remove & Discard patch within 12 hours or as directed by MD, Disp: 30 patch, Rfl: 0    lidocaine (LMX) 4 % cream, Apply topically as needed for moderate pain, Disp: 30 g, Rfl: 0    lisinopril-hydrochlorothiazide (PRINZIDE,ZESTORETIC) 20-12.5 MG per tablet, Take 1 tablet by mouth in the morning and 1 tablet before bedtime., Disp: 200 tablet, Rfl: 1    methocarbamol (ROBAXIN) 750 mg tablet, Take 2 tablets (1,500 mg total) by mouth every 8 (eight) hours, Disp: 60 tablet, Rfl: 2    pantoprazole (PROTONIX) 40 mg tablet, Take 1 tablet (40 mg total) by mouth 2 (two) times a day before meals, Disp: 60 tablet, Rfl: 0    rivaroxaban (Xarelto Starter Pack) 15 & 20 MG starter pack, Take 15 mg by mouth twice daily for 21 days, then 20 mg once daily thereafter., Disp: 51 each, Rfl: 0    traMADol (Ultram) 50 mg tablet, Take 1 tablet (50 mg total) by mouth every 8 (eight) hours as needed for moderate pain and 2 pills at bedtime at least 6 hours after last dose of the day, Disp: 100 tablet, Rfl: 0    Ubrogepant (UBRELVY) 100 MG tablet, Take 1 tablet (100 mg) one time as needed for migraine. May repeat one additional tablet (100 mg) at least two hours after the first dose. Do not use more than two doses per day, or for more than twelve days per month., Disp: 12 tablet, Rfl: 3  [6]   Allergies  Allergen Reactions    Cephalexin Hives     Skin peeling  Tolerates penicillins (tolerated unasyn and zosyn)    Metformin GI Intolerance    Pollen Extract Sneezing

## 2025-06-10 NOTE — PROGRESS NOTES
Consultation Visit   Name: Yoni Castillo      : 1965      MRN: [id number]  Encounter Provider: Derek Chavira MD  Encounter Date: 6/10/2025   Encounter department: [facility] RADIATION ONCOLOGY  :  Assessment & Plan  Malignant neoplasm of lower third of esophagus (HCC)       Yoni Castillo 1965 is a 60 y.o. male is seen for radiation oncology consultation today for localized esophageal carcinoma with clinical stage TX, N0, M0 disease.  He presented with progressively worsening dysphagia in May of this year and had an EGD in May 19, 2025 revealing a mass in the lower third of the esophagus along with associated Butler's esophagus.  Biopsy of the GE junction revealed a moderate to poorly differentiated carcinoma with glandular, signet ring cell and squamous features.  This is consistent with an adenocarcinoma.  He had workup with CAT scans of the chest, abdomen, and pelvis on May 17, 2025 that revealed no evidence of any metastatic disease.  There was mild wall thickening and hyperenhancement of the distal thoracic esophagus.  He was seen by Dr. Alarcon and Dr. Kiser who discussed staging studies with esophageal ultrasound and PET/CT that have been scheduled.  Discussed neoadjuvant chemotherapy with or without radiation therapy followed by restaging and then surgery.  The patient saw Dr. Novak on  who recommended preoperative chemotherapy with infusional 5-FU, docetaxel and oxaliplatin (FLOT) with or without immunotherapy with durvalumab pending insurance authorization.  He has recommended 4 cycles of the FLOT followed by surgical resection.  He is seen for consultation today to consider preoperative radiation therapy along with concurrent chemotherapy.  We discussed that this would involve 28 fractions of radiation therapy with chemotherapy.  We discussed that recent data supports the use of FLOT chemotherapy preoperatively without any radiation therapy.  The  patient is comfortable with proceeding with the FLOT chemotherapy and there are no plans for any radiation therapy.  He would not be seen here for any further appointments.      History of Present Illness   Chief Complaint   Patient presents with   • Esophageal Cancer     Radiation oncology     Pertinent Medical History   Yoni Castillo 1965 is a 60 y.o. male Referred by Pati Mejia for esophageal carcinoma. He originally went to the ER 5/17/25-5/20/25 for dysphagia. Further work up was completed.      PMHx: hypertension,Type 2 Diabetes, CKD stage 3, and skin cancer.      5/17/25 CT CAP   IMPRESSION:   Mild wall thickening and hyperenhancement of the distal thoracic esophagus compatible with esophagitis. Ingested material retained within the mid thoracic esophagus. Gastroenterology consult recommended     5/19/25 EGD   IMPRESSION:  Single malignant-appearing mass in the lower third of the esophagus; performed cold forceps biopsy with partial removal  C3M4 Butler's esophagus with associated lesion  3 cm hiatal hernia  The upper third of the esophagus and middle third of the esophagus appeared normal.  Edematous, erythematous, granular mucosa in the cardia, fundus of the stomach, body of the stomach, incisura and antrum; performed cold forceps biopsy to rule out H. pylori  The duodenal bulb and 2nd part of the duodenum appeared normal.        RECOMMENDATION:  Await pathology results   Return to floor  Resume diet as tolerated  Continue twice daily PPI  Pending pathology results will need referral to medical and surgical oncology teams         5/19/25 Tissue Exam   A. Stomach, biopsy:  -Gastric antral mucosa with reactive/chemical gastropathy.  -Gastric oxyntic mucosa with no significant histopathologic change.   -Negative for Helicobacter pylori organisms on H&E stain.      B. Esophagogastric junction, mass, biopsy:  -Carcinoma, moderately to poorly differentiated, with glandular, signet ring cell and  squamous features.  -MMR has been ordered, results to follow in an addendum.  -HER2 IHC has been ordered, results to follow in an addendum.   6/4/25   Discussed upcoming EUS and PET. Discussed chemotherapy with or with radiation. Than imaging will be ordered after treatment to determine surgery. Discussed surgery for esophageal cancer is high risk, and he will need to maintain nutrition and blood sugar. He declined oncology nutrition at this time.      6/5/25   Discussed chemotherapy with or with out radiation, restaging than surgery. Discussed protein intake. EUS/EGD scheduled, along with PET. Than he will meet with medical and radiation oncology.      6/9/25   Recommend nikki-operative chemotherapy with infusional 5-fluorouracil, docetaxel, and oxaliplatin (FLOT) plus minus immunotherapy with the anti-PD-L1 agent durvalumab (Imfinzi).  If durvalumab is not approved by the patient's insurance, he will receive 4 cycles of neoadjuvant FLOT followed by surgical resection of the primary poorly differentiated adenocarcinoma of the distal esophagus and regional lymph node dissection, followed by 4 cycles of adjuvant FLOT, followed by surveillance.       Patient is here for consultation today with his wife.  He reports he has been having intermittent dysphagia for nearly 5 years that became much worse over the last year especially this past May when it was hard to get anything down.  He was having nausea and vomiting.  He has lost about 10 pounds in the last 2 months.  He denies any previous history of cancer including no skin cancer.  He denies any previous radiation therapy and no chemotherapy.  He has been tolerating some soft foods but mostly having protein shakes/smoothies.  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  He has never smoked  any tobacco.  He quit drinking alcohol 15 years ago.      Upcoming   6/11/25 GI  6/16/25 Port placement  6/23/25 PET-CT  6/24/25 Endo  7/14/25 US     Oncology History   Cancer Staging   Esophageal carcinoma (HCC)  Staging form: Esophagus - Adenocarcinoma, AJCC 8th Edition  - Clinical stage from 6/8/2025: Stage Unknown (cTX, cN0, cM0, G3) - Signed by Daryl Novak MD on 6/8/2025  Stage prefix: Initial diagnosis  Total positive nodes: 0  Histologic grading system: 3 grade system  Lymph-vascular invasion (LVI): Presence of LVI unknown/indeterminate  Diagnostic confirmation: Positive histology  Specimen type: Biopsy / Limited Resection  Staged by: Managing physician  Clinical staging modalities: Biopsy, CT  Perineural invasion (PNI): Unknown  Stage used in treatment planning: Yes  National guidelines used in treatment planning: Yes  Oncology History   Malignant neoplasm of lower third of esophagus (HCC)   5/19/2025 Biopsy    B. Esophagogastric junction, mass, biopsy:  -Carcinoma, moderately to poorly differentiated, with glandular, signet ring cell and squamous features.       5/30/2025 Initial Diagnosis    Esophageal carcinoma (HCC)     6/8/2025 -  Cancer Staged    Staging form: Esophagus - Adenocarcinoma, AJCC 8th Edition  - Clinical stage from 6/8/2025: Stage Unknown (cTX, cN0, cM0, G3) - Signed by Daryl Novak MD on 6/8/2025  Stage prefix: Initial diagnosis  Total positive nodes: 0  Histologic grading system: 3 grade system  Lymph-vascular invasion (LVI): Presence of LVI unknown/indeterminate  Diagnostic confirmation: Positive histology  Specimen type: Biopsy / Limited Resection  Staged by: Managing physician  Clinical staging modalities: Biopsy, CT  Perineural invasion (PNI): Unknown  Stage used in treatment planning: Yes  National guidelines used in treatment planning: Yes       6/25/2025 -  Chemotherapy    durvalumab (IMFINZI) IVPB, 1,500 mg (100 % of original dose 1,500 mg), 0 of 1 cycle  Dose modification:  1,500 mg (original dose 1,500 mg, Cycle 1)  DOCEtaxel (TAXOTERE) chemo infusion, 50 mg/m2 = 123.6 mg, 0 of 4 cycles  leucovorin calcium IVPB, 200 mg/m2 = 494 mg, 0 of 4 cycles  oxaliplatin (ELOXATIN) chemo infusion, 85 mg/m2 = 209.95 mg, 0 of 4 cycles  fluorouracil (ADRUCIL) ambulatory infusion Soln, 2,600 mg/m2/day = 6,420 mg, 0 of 4 cycles        Review of Systems Refer to nursing note.    Medications Ordered Prior to Encounter[1]   Social History[2]     Objective   /80 (BP Location: Left arm, Patient Position: Sitting, Cuff Size: Large)   Pulse 78   Temp (!) 97.2 °F (36.2 °C) (Temporal)   Resp 18   Ht 6' (1.829 m)   Wt 129 kg (285 lb)   SpO2 95%   BMI 38.65 kg/m²     Pain Screening:  Pain Score:   6  ECOG ECOG Performance Status: 1 - Restricted in physically strenuous activity but ambulatory and able to carry out work of a light or sedentary nature, e.g., light house work, office work  Physical Exam  Vitals and nursing note reviewed.   Constitutional:       General: He is not in acute distress.     Appearance: Normal appearance. He is well-developed. He is not diaphoretic.   HENT:      Head: Normocephalic and atraumatic.      Mouth/Throat:      Pharynx: No oropharyngeal exudate.     Eyes:      General: No scleral icterus.     Conjunctiva/sclera: Conjunctivae normal.      Pupils: Pupils are equal, round, and reactive to light.     Neck:      Thyroid: No thyromegaly.      Trachea: No tracheal deviation.     Cardiovascular:      Rate and Rhythm: Normal rate and regular rhythm.      Heart sounds: Normal heart sounds.   Pulmonary:      Effort: Pulmonary effort is normal. No respiratory distress.      Breath sounds: Normal breath sounds. No stridor. No wheezing, rhonchi or rales.   Chest:      Chest wall: No tenderness.   Abdominal:      General: Bowel sounds are normal. There is no distension.      Palpations: Abdomen is soft. There is no mass.      Tenderness: There is no abdominal tenderness.       Hernia: No hernia is present.     Musculoskeletal:         General: No swelling or tenderness. Normal range of motion.      Cervical back: Normal range of motion and neck supple. No tenderness.   Lymphadenopathy:      Cervical: No cervical adenopathy.      Upper Body:      Right upper body: No supraclavicular adenopathy.      Left upper body: No supraclavicular adenopathy.     Skin:     General: Skin is warm and dry.      Coloration: Skin is not jaundiced or pale.      Findings: No erythema or rash.     Neurological:      General: No focal deficit present.      Mental Status: He is alert and oriented to person, place, and time.      Cranial Nerves: No cranial nerve deficit.      Sensory: No sensory deficit.      Motor: No weakness.      Coordination: Coordination normal.     Psychiatric:         Mood and Affect: Mood normal.         Behavior: Behavior normal.         Thought Content: Thought content normal.         Judgment: Judgment normal.          Radiology Results: I have reviewed radiology images/reports described above.       Administrative Statements   I have spent a total time of 55 minutes in caring for this patient on the day of the visit/encounter including Diagnostic results, Prognosis, Risks and benefits of tx options, Instructions for management, Patient and family education, Importance of tx compliance, Risk factor reductions, Impressions, Counseling / Coordination of care, Documenting in the medical record, Reviewing/placing orders in the medical record (including tests, medications, and/or procedures), Obtaining or reviewing history  , and Communicating with other healthcare professionals .           [1]  Current Outpatient Medications on File Prior to Visit   Medication Sig Dispense Refill   • acetaminophen (TYLENOL) 500 mg tablet Take 2 tablets (1,000 mg total) by mouth every 8 (eight) hours 60 tablet 0   • amitriptyline (ELAVIL) 10 mg tablet 30mg at bedtime 90 tablet 5   • ammonium lactate  (LAC-HYDRIN) 12 % lotion as needed in the morning and as needed in the evening.     • carvedilol (COREG) 25 mg tablet Take 1 tablet (25 mg total) by mouth 2 (two) times a day with meals 200 tablet 1   • Empagliflozin (Jardiance) 25 MG TABS Take 1 tablet (25 mg total) by mouth daily 90 tablet 1   • hydroCHLOROthiazide 12.5 mg tablet Take 1 tablet (12.5 mg total) by mouth 2 (two) times a day 100 tablet 3   • ketoconazole (NIZORAL) 2 % cream if needed     • lidocaine (Lidoderm) 5 % Apply 1 patch topically over 12 hours daily Remove & Discard patch within 12 hours or as directed by MD 30 patch 0   • lidocaine (LMX) 4 % cream Apply topically as needed for moderate pain 30 g 0   • lisinopril-hydrochlorothiazide (PRINZIDE,ZESTORETIC) 20-12.5 MG per tablet Take 1 tablet by mouth in the morning and 1 tablet before bedtime. 200 tablet 1   • methocarbamol (ROBAXIN) 750 mg tablet Take 2 tablets (1,500 mg total) by mouth every 8 (eight) hours 60 tablet 2   • pantoprazole (PROTONIX) 40 mg tablet Take 1 tablet (40 mg total) by mouth 2 (two) times a day before meals 60 tablet 0   • rivaroxaban (Xarelto Starter Pack) 15 & 20 MG starter pack Take 15 mg by mouth twice daily for 21 days, then 20 mg once daily thereafter. 51 each 0   • traMADol (Ultram) 50 mg tablet Take 1 tablet (50 mg total) by mouth every 8 (eight) hours as needed for moderate pain and 2 pills at bedtime at least 6 hours after last dose of the day 100 tablet 0   • Ubrogepant (UBRELVY) 100 MG tablet Take 1 tablet (100 mg) one time as needed for migraine. May repeat one additional tablet (100 mg) at least two hours after the first dose. Do not use more than two doses per day, or for more than twelve days per month. 12 tablet 3   • Accu-Chek FastClix Lancets MISC Use to test blood sugar once a day (Patient not taking: Reported on 5/6/2025) 102 each 0   • Blood Glucose Monitoring Suppl (Accu-Chek Guide Me) w/Device KIT Use to check blood sugar once a day (Patient not  taking: Reported on 5/6/2025) 1 kit 0   • glucose blood (Accu-Chek Guide) test strip Use to check blood sugar once a day 100 strip 0   • Insulin Pen Needle (BD Pen Needle [place] U/F) 32G X 4 MM MISC Use in the morning (Patient not taking: Reported on 5/17/2025) 100 each 1     No current facility-administered medications on file prior to visit.   [2]  Social History  Tobacco Use   • Smoking status: Never   • Smokeless tobacco: Never   Vaping Use   • Vaping status: Never Used   Substance and Sexual Activity   • Alcohol use: Yes     Alcohol/week: 0.0 - 1.0 standard drinks of alcohol     Comment: ocassional   • Drug use: Never   • Sexual activity: Not Currently     Partners: Female     Birth control/protection: Abstinence

## 2025-06-10 NOTE — H&P (VIEW-ONLY)
Consultation Visit   Name: Yoni Castillo      : 1965      MRN: [id number]  Encounter Provider: Derek Chavira MD  Encounter Date: 6/10/2025   Encounter department: [facility] RADIATION ONCOLOGY  :  Assessment & Plan  Malignant neoplasm of lower third of esophagus (HCC)       Yoni Castillo 1965 is a 60 y.o. male is seen for radiation oncology consultation today for localized esophageal carcinoma with clinical stage TX, N0, M0 disease.  He presented with progressively worsening dysphagia in May of this year and had an EGD in May 19, 2025 revealing a mass in the lower third of the esophagus along with associated Butler's esophagus.  Biopsy of the GE junction revealed a moderate to poorly differentiated carcinoma with glandular, signet ring cell and squamous features.  This is consistent with an adenocarcinoma.  He had workup with CAT scans of the chest, abdomen, and pelvis on May 17, 2025 that revealed no evidence of any metastatic disease.  There was mild wall thickening and hyperenhancement of the distal thoracic esophagus.  He was seen by Dr. Alarcon and Dr. Kiser who discussed staging studies with esophageal ultrasound and PET/CT that have been scheduled.  Discussed neoadjuvant chemotherapy with or without radiation therapy followed by restaging and then surgery.  The patient saw Dr. Novak on  who recommended preoperative chemotherapy with infusional 5-FU, docetaxel and oxaliplatin (FLOT) with or without immunotherapy with durvalumab pending insurance authorization.  He has recommended 4 cycles of the FLOT followed by surgical resection.  He is seen for consultation today to consider preoperative radiation therapy along with concurrent chemotherapy.  We discussed that this would involve 28 fractions of radiation therapy with chemotherapy.  We discussed that recent data supports the use of FLOT chemotherapy preoperatively without any radiation therapy.  The  patient is comfortable with proceeding with the FLOT chemotherapy and there are no plans for any radiation therapy.  He would not be seen here for any further appointments.      History of Present Illness   Chief Complaint   Patient presents with   • Esophageal Cancer     Radiation oncology     Pertinent Medical History   Yoni Castillo 1965 is a 60 y.o. male Referred by Pati Mejia for esophageal carcinoma. He originally went to the ER 5/17/25-5/20/25 for dysphagia. Further work up was completed.      PMHx: hypertension,Type 2 Diabetes, CKD stage 3, and skin cancer.      5/17/25 CT CAP   IMPRESSION:   Mild wall thickening and hyperenhancement of the distal thoracic esophagus compatible with esophagitis. Ingested material retained within the mid thoracic esophagus. Gastroenterology consult recommended     5/19/25 EGD   IMPRESSION:  Single malignant-appearing mass in the lower third of the esophagus; performed cold forceps biopsy with partial removal  C3M4 Butler's esophagus with associated lesion  3 cm hiatal hernia  The upper third of the esophagus and middle third of the esophagus appeared normal.  Edematous, erythematous, granular mucosa in the cardia, fundus of the stomach, body of the stomach, incisura and antrum; performed cold forceps biopsy to rule out H. pylori  The duodenal bulb and 2nd part of the duodenum appeared normal.        RECOMMENDATION:  Await pathology results   Return to floor  Resume diet as tolerated  Continue twice daily PPI  Pending pathology results will need referral to medical and surgical oncology teams         5/19/25 Tissue Exam   A. Stomach, biopsy:  -Gastric antral mucosa with reactive/chemical gastropathy.  -Gastric oxyntic mucosa with no significant histopathologic change.   -Negative for Helicobacter pylori organisms on H&E stain.      B. Esophagogastric junction, mass, biopsy:  -Carcinoma, moderately to poorly differentiated, with glandular, signet ring cell and  squamous features.  -MMR has been ordered, results to follow in an addendum.  -HER2 IHC has been ordered, results to follow in an addendum.   6/4/25   Discussed upcoming EUS and PET. Discussed chemotherapy with or with radiation. Than imaging will be ordered after treatment to determine surgery. Discussed surgery for esophageal cancer is high risk, and he will need to maintain nutrition and blood sugar. He declined oncology nutrition at this time.      6/5/25   Discussed chemotherapy with or with out radiation, restaging than surgery. Discussed protein intake. EUS/EGD scheduled, along with PET. Than he will meet with medical and radiation oncology.      6/9/25   Recommend nikki-operative chemotherapy with infusional 5-fluorouracil, docetaxel, and oxaliplatin (FLOT) plus minus immunotherapy with the anti-PD-L1 agent durvalumab (Imfinzi).  If durvalumab is not approved by the patient's insurance, he will receive 4 cycles of neoadjuvant FLOT followed by surgical resection of the primary poorly differentiated adenocarcinoma of the distal esophagus and regional lymph node dissection, followed by 4 cycles of adjuvant FLOT, followed by surveillance.       Patient is here for consultation today with his wife.  He reports he has been having intermittent dysphagia for nearly 5 years that became much worse over the last year especially this past May when it was hard to get anything down.  He was having nausea and vomiting.  He has lost about 10 pounds in the last 2 months.  He denies any previous history of cancer including no skin cancer.  He denies any previous radiation therapy and no chemotherapy.  He has been tolerating some soft foods but mostly having protein shakes/smoothies.  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  He has never smoked  any tobacco.  He quit drinking alcohol 15 years ago.      Upcoming   6/11/25 GI  6/16/25 Port placement  6/23/25 PET-CT  6/24/25 Endo  7/14/25 US     Oncology History   Cancer Staging   Esophageal carcinoma (HCC)  Staging form: Esophagus - Adenocarcinoma, AJCC 8th Edition  - Clinical stage from 6/8/2025: Stage Unknown (cTX, cN0, cM0, G3) - Signed by Daryl Novak MD on 6/8/2025  Stage prefix: Initial diagnosis  Total positive nodes: 0  Histologic grading system: 3 grade system  Lymph-vascular invasion (LVI): Presence of LVI unknown/indeterminate  Diagnostic confirmation: Positive histology  Specimen type: Biopsy / Limited Resection  Staged by: Managing physician  Clinical staging modalities: Biopsy, CT  Perineural invasion (PNI): Unknown  Stage used in treatment planning: Yes  National guidelines used in treatment planning: Yes  Oncology History   Malignant neoplasm of lower third of esophagus (HCC)   5/19/2025 Biopsy    B. Esophagogastric junction, mass, biopsy:  -Carcinoma, moderately to poorly differentiated, with glandular, signet ring cell and squamous features.       5/30/2025 Initial Diagnosis    Esophageal carcinoma (HCC)     6/8/2025 -  Cancer Staged    Staging form: Esophagus - Adenocarcinoma, AJCC 8th Edition  - Clinical stage from 6/8/2025: Stage Unknown (cTX, cN0, cM0, G3) - Signed by Daryl Novak MD on 6/8/2025  Stage prefix: Initial diagnosis  Total positive nodes: 0  Histologic grading system: 3 grade system  Lymph-vascular invasion (LVI): Presence of LVI unknown/indeterminate  Diagnostic confirmation: Positive histology  Specimen type: Biopsy / Limited Resection  Staged by: Managing physician  Clinical staging modalities: Biopsy, CT  Perineural invasion (PNI): Unknown  Stage used in treatment planning: Yes  National guidelines used in treatment planning: Yes       6/25/2025 -  Chemotherapy    durvalumab (IMFINZI) IVPB, 1,500 mg (100 % of original dose 1,500 mg), 0 of 1 cycle  Dose modification:  1,500 mg (original dose 1,500 mg, Cycle 1)  DOCEtaxel (TAXOTERE) chemo infusion, 50 mg/m2 = 123.6 mg, 0 of 4 cycles  leucovorin calcium IVPB, 200 mg/m2 = 494 mg, 0 of 4 cycles  oxaliplatin (ELOXATIN) chemo infusion, 85 mg/m2 = 209.95 mg, 0 of 4 cycles  fluorouracil (ADRUCIL) ambulatory infusion Soln, 2,600 mg/m2/day = 6,420 mg, 0 of 4 cycles        Review of Systems Refer to nursing note.    Medications Ordered Prior to Encounter[1]   Social History[2]     Objective   /80 (BP Location: Left arm, Patient Position: Sitting, Cuff Size: Large)   Pulse 78   Temp (!) 97.2 °F (36.2 °C) (Temporal)   Resp 18   Ht 6' (1.829 m)   Wt 129 kg (285 lb)   SpO2 95%   BMI 38.65 kg/m²     Pain Screening:  Pain Score:   6  ECOG ECOG Performance Status: 1 - Restricted in physically strenuous activity but ambulatory and able to carry out work of a light or sedentary nature, e.g., light house work, office work  Physical Exam  Vitals and nursing note reviewed.   Constitutional:       General: He is not in acute distress.     Appearance: Normal appearance. He is well-developed. He is not diaphoretic.   HENT:      Head: Normocephalic and atraumatic.      Mouth/Throat:      Pharynx: No oropharyngeal exudate.     Eyes:      General: No scleral icterus.     Conjunctiva/sclera: Conjunctivae normal.      Pupils: Pupils are equal, round, and reactive to light.     Neck:      Thyroid: No thyromegaly.      Trachea: No tracheal deviation.     Cardiovascular:      Rate and Rhythm: Normal rate and regular rhythm.      Heart sounds: Normal heart sounds.   Pulmonary:      Effort: Pulmonary effort is normal. No respiratory distress.      Breath sounds: Normal breath sounds. No stridor. No wheezing, rhonchi or rales.   Chest:      Chest wall: No tenderness.   Abdominal:      General: Bowel sounds are normal. There is no distension.      Palpations: Abdomen is soft. There is no mass.      Tenderness: There is no abdominal tenderness.       Hernia: No hernia is present.     Musculoskeletal:         General: No swelling or tenderness. Normal range of motion.      Cervical back: Normal range of motion and neck supple. No tenderness.   Lymphadenopathy:      Cervical: No cervical adenopathy.      Upper Body:      Right upper body: No supraclavicular adenopathy.      Left upper body: No supraclavicular adenopathy.     Skin:     General: Skin is warm and dry.      Coloration: Skin is not jaundiced or pale.      Findings: No erythema or rash.     Neurological:      General: No focal deficit present.      Mental Status: He is alert and oriented to person, place, and time.      Cranial Nerves: No cranial nerve deficit.      Sensory: No sensory deficit.      Motor: No weakness.      Coordination: Coordination normal.     Psychiatric:         Mood and Affect: Mood normal.         Behavior: Behavior normal.         Thought Content: Thought content normal.         Judgment: Judgment normal.          Radiology Results: I have reviewed radiology images/reports described above.       Administrative Statements   I have spent a total time of 55 minutes in caring for this patient on the day of the visit/encounter including Diagnostic results, Prognosis, Risks and benefits of tx options, Instructions for management, Patient and family education, Importance of tx compliance, Risk factor reductions, Impressions, Counseling / Coordination of care, Documenting in the medical record, Reviewing/placing orders in the medical record (including tests, medications, and/or procedures), Obtaining or reviewing history  , and Communicating with other healthcare professionals .           [1]  Current Outpatient Medications on File Prior to Visit   Medication Sig Dispense Refill   • acetaminophen (TYLENOL) 500 mg tablet Take 2 tablets (1,000 mg total) by mouth every 8 (eight) hours 60 tablet 0   • amitriptyline (ELAVIL) 10 mg tablet 30mg at bedtime 90 tablet 5   • ammonium lactate  (LAC-HYDRIN) 12 % lotion as needed in the morning and as needed in the evening.     • carvedilol (COREG) 25 mg tablet Take 1 tablet (25 mg total) by mouth 2 (two) times a day with meals 200 tablet 1   • Empagliflozin (Jardiance) 25 MG TABS Take 1 tablet (25 mg total) by mouth daily 90 tablet 1   • hydroCHLOROthiazide 12.5 mg tablet Take 1 tablet (12.5 mg total) by mouth 2 (two) times a day 100 tablet 3   • ketoconazole (NIZORAL) 2 % cream if needed     • lidocaine (Lidoderm) 5 % Apply 1 patch topically over 12 hours daily Remove & Discard patch within 12 hours or as directed by MD 30 patch 0   • lidocaine (LMX) 4 % cream Apply topically as needed for moderate pain 30 g 0   • lisinopril-hydrochlorothiazide (PRINZIDE,ZESTORETIC) 20-12.5 MG per tablet Take 1 tablet by mouth in the morning and 1 tablet before bedtime. 200 tablet 1   • methocarbamol (ROBAXIN) 750 mg tablet Take 2 tablets (1,500 mg total) by mouth every 8 (eight) hours 60 tablet 2   • pantoprazole (PROTONIX) 40 mg tablet Take 1 tablet (40 mg total) by mouth 2 (two) times a day before meals 60 tablet 0   • rivaroxaban (Xarelto Starter Pack) 15 & 20 MG starter pack Take 15 mg by mouth twice daily for 21 days, then 20 mg once daily thereafter. 51 each 0   • traMADol (Ultram) 50 mg tablet Take 1 tablet (50 mg total) by mouth every 8 (eight) hours as needed for moderate pain and 2 pills at bedtime at least 6 hours after last dose of the day 100 tablet 0   • Ubrogepant (UBRELVY) 100 MG tablet Take 1 tablet (100 mg) one time as needed for migraine. May repeat one additional tablet (100 mg) at least two hours after the first dose. Do not use more than two doses per day, or for more than twelve days per month. 12 tablet 3   • Accu-Chek FastClix Lancets MISC Use to test blood sugar once a day (Patient not taking: Reported on 5/6/2025) 102 each 0   • Blood Glucose Monitoring Suppl (Accu-Chek Guide Me) w/Device KIT Use to check blood sugar once a day (Patient not  taking: Reported on 5/6/2025) 1 kit 0   • glucose blood (Accu-Chek Guide) test strip Use to check blood sugar once a day 100 strip 0   • Insulin Pen Needle (BD Pen Needle [place] U/F) 32G X 4 MM MISC Use in the morning (Patient not taking: Reported on 5/17/2025) 100 each 1     No current facility-administered medications on file prior to visit.   [2]  Social History  Tobacco Use   • Smoking status: Never   • Smokeless tobacco: Never   Vaping Use   • Vaping status: Never Used   Substance and Sexual Activity   • Alcohol use: Yes     Alcohol/week: 0.0 - 1.0 standard drinks of alcohol     Comment: ocassional   • Drug use: Never   • Sexual activity: Not Currently     Partners: Female     Birth control/protection: Abstinence

## 2025-06-10 NOTE — ASSESSMENT & PLAN NOTE
Yoni VALENCIA [facility] 1965 is a 60 y.o. male is seen for radiation oncology consultation today for localized esophageal carcinoma with clinical stage TX, N0, M0 disease.  He presented with progressively worsening dysphagia in May of this year and had an EGD in May 19, 2025 revealing a mass in the lower third of the esophagus along with associated Butler's esophagus.  Biopsy of the GE junction revealed a moderate to poorly differentiated carcinoma with glandular, signet ring cell and squamous features.  This is consistent with an adenocarcinoma.  He had workup with CAT scans of the chest, abdomen, and pelvis on May 17, 2025 that revealed no evidence of any metastatic disease.  There was mild wall thickening and hyperenhancement of the distal thoracic esophagus.  He was seen by Dr. Alarcon and Dr. Kiser who discussed staging studies with esophageal ultrasound and PET/CT that have been scheduled.  Discussed neoadjuvant chemotherapy with or without radiation therapy followed by restaging and then surgery.  The patient saw Dr. Novak on June 9 who recommended preoperative chemotherapy with infusional 5-FU, docetaxel and oxaliplatin (FLOT) with or without immunotherapy with durvalumab pending insurance authorization.  He has recommended 4 cycles of the FLOT followed by surgical resection.  He is seen for consultation today to consider preoperative radiation therapy along with concurrent chemotherapy.  We discussed that this would involve 28 fractions of radiation therapy with chemotherapy.  We discussed that recent data supports the use of FLOT chemotherapy preoperatively without any radiation therapy.  The patient is comfortable with proceeding with the FLOT chemotherapy and there are no plans for any radiation therapy.  He would not be seen here for any further appointments.

## 2025-06-10 NOTE — PROGRESS NOTES
"I reached out and spoke with Ed,.  He has been seen in consult by Surgical Oncology, Medical Oncology, and Thoracic Oncology. I introduced myself and explained my role as their Patient Navigator. I reviewed for any barriers to care and offered referrals to supportive services as needed. I reviewed and updated the members assigned to the care team in [facility]. He knows the members of the care team as well as how and when to contact them with any needs.     Distress Thermometer completed at this time. Patient scored 6/10. Referral to SW placed.. Ed was very emotional during our call. He stated the reality of his diagnosis grows with each passing day. \"I think I will break the day I get my port\" He indicated his partner Louise doesn't seem to understand some of the changes he is experiencing - such as changes in likes/dislikes. He used to like to cook but now some foods make him nauseous.     He is currently able to drive and denies any transportation needs.      He is currently experiencing these symptoms; pain. We discussed the role of Palliative Care. Referral placed. Patient aware they will receive a call to schedule. I provided the direct number as well if they would prefer to call.      He states that he is struggling with eating and drinking. Malnutrition screening tool completed. He does meet parameters for auto referral to Oncology Registered Dietician.    Completed MST and patient Met criteria for referral to Oncology Dietician Services and agreeable to referral    Patient does not smoke.     He states he is well supported by family and friends.  Community support groups discussed including the Cancer Support Community of the [facility]. Patient declined information at this time.     He feels he has inadequate financial resources related to insurance coverage. Routing message to Oncology Social Worker and Oncology Financial Advocacy Team.    He verbalizes managing the schedules well.   Future Appointments "   Date Time Provider Department Center   6/10/2025  1:00 PM Derek Chavira MD AL Rad Onc AL [facility]   6/11/2025 10:00 AM Marta Oropeza PA-C GASTRO ALL Practice-Med   6/16/2025  8:30 AM EA IR 1 EA IR  HOSPITAL   6/23/2025 11:00 AM SH PET 1 SH PET  HOSPITAL   6/23/2025  1:40 PM Daryl Novak MD HEM ONC [facility]   6/24/2025  8:30 AM Krystina Diaz MD AL Endo AL [facility]   6/25/2025 10:30 AM SH INF CHAIR 9 SH Infusion  HOSPITAL   6/26/2025 12:30 PM SH INF CHAIR 2 SH Infusion  HOSPITAL   7/9/2025 11:30 AM SH INF CHAIR 8 SH Infusion  HOSPITAL   7/10/2025 11:15 AM Carroll Mccarthy MD ORTHO ALL Practice-Ort   7/10/2025  1:30 PM SH INF CHAIR 2 SH Infusion  HOSPITAL   7/14/2025  1:00 PM AL US 3 AL US AL HOSPITAL   7/21/2025  2:45 PM Shayna Kendall PA-C DIAB CTR [facility] Med Spc   7/22/2025  2:30 PM Adis Hamilton MD CTR UR AL Practice-[facility]   7/24/2025  1:30 PM Charlie Hansen MD ORTHO [facility] Practice-Ort   8/21/2025  2:30 PM Richard Marinelli MD NEPH H[facility] ST Med Spc   8/22/2025  1:15 PM Gurmeet Peraza DPM PG POD [facility] Practice-Ort   11/6/2025  2:20 PM Agustina Kiser MD THOR [facility]   11/19/2025  1:20 PM Judd Ji MD INT MED [facility]        Based on individual needs I will follow up in about 2 weeks. I have provided my direct contact information and welcome them to contact me if needs as discussed above change. He was appreciative for the call.

## 2025-06-10 NOTE — PROGRESS NOTES
[facility]  Documentation of Informed Consent      I, Monica Carter, Clinical Research Coordinator, met with Yoni Castillo and his significant other, Louise, on Date: 6/10/25 at Time: 2: pm to conduct the informed consent process for enrollment into Research Study: [employer] 2021-05 .Monica Carter, Yoni Castillo, and Louise was present during the consent discussion and the signing of the consent form.   The current IRB approved informed consent form was reviewed in its entirety. Yoni Castlilo was given sufficient time to review the study information and to ask questions, and all questions were answered to the satisfaction of Yoni Castillo. A copy of the signed informed consent form was provided to Yoni Castillo. The informed consent document was signed before any research procedures were performed. Blood was drawn by GABBY Elkins following signing of the informed consent form.

## 2025-06-11 ENCOUNTER — TELEPHONE (OUTPATIENT)
Dept: NUTRITION | Facility: CLINIC | Age: 60
End: 2025-06-11

## 2025-06-11 ENCOUNTER — TELEPHONE (OUTPATIENT)
Dept: GASTROENTEROLOGY | Facility: MEDICAL CENTER | Age: 60
End: 2025-06-11

## 2025-06-11 RX ORDER — LIDOCAINE AND PRILOCAINE 25; 25 MG/G; MG/G
CREAM TOPICAL AS NEEDED
Qty: 100 G | Refills: 1 | Status: SHIPPED | OUTPATIENT
Start: 2025-06-11

## 2025-06-11 RX ORDER — ONDANSETRON 8 MG/1
8 TABLET, ORALLY DISINTEGRATING ORAL EVERY 8 HOURS PRN
Qty: 20 TABLET | Refills: 0 | Status: SHIPPED | OUTPATIENT
Start: 2025-06-11

## 2025-06-11 NOTE — TELEPHONE ENCOUNTER
Called patient to reschedule today's missed appointment   (Provider Marta requested an urgent slot be used so he can be seen within the month); patient needs to be seen-    Left voicemail and requested call back to rescheduled in urgent slot    Sent Flite message with appointment link attached to reschedule as well

## 2025-06-11 NOTE — TELEPHONE ENCOUNTER
Received notification by patient navigator (Paty ANGEL) on 6/10/25 that pt has triggered for oncology nutrition care (reason for referral: Esophageal CA dx and Malnutrition Screening Tool (MST) Triggers: scored a 2 indicating an unsure amout of recent wt loss and is not eating poorly due to a decreased appetite. (Date of MST: 6/10/25)).  Received notification by radiation RN (Jasmin CHONG) on 6/10/25 that pt has triggered for oncology nutrition care (reason for referral: Esophageal CA dx and Malnutrition Screening Tool (MST) Triggers: scored a 1 indicating 2-13# (0.9-6 kg) recent wt loss and is not eating poorly due to a decreased appetite. (Date of MST: 6/10/25)).    Contacted Edmarcelino today to establish care.  No answer.  Left voice message with the reason for today's call and this RD’s contact information asking that Edward call back as able/desired.

## 2025-06-13 NOTE — TELEPHONE ENCOUNTER
Second attempt made today to reach Yoni to establish care and discuss his nutrition.  Spoke with Yoni and introduced self. Explained reason for call. Discussed oncology nutrition services available to him. Initial RD consultation scheduled for 6/16/25 at 11 am.

## 2025-06-15 DIAGNOSIS — E11.69 TYPE 2 DIABETES MELLITUS WITH OTHER SPECIFIED COMPLICATION, WITHOUT LONG-TERM CURRENT USE OF INSULIN (HCC): ICD-10-CM

## 2025-06-16 ENCOUNTER — TELEPHONE (OUTPATIENT)
Dept: NUTRITION | Facility: CLINIC | Age: 60
End: 2025-06-16

## 2025-06-16 ENCOUNTER — HOSPITAL ENCOUNTER (OUTPATIENT)
Dept: INTERVENTIONAL RADIOLOGY/VASCULAR | Facility: HOSPITAL | Age: 60
Discharge: HOME | End: 2025-06-16
Payer: MEDICARE

## 2025-06-16 VITALS
BODY MASS INDEX: 37.93 KG/M2 | WEIGHT: 280 LBS | HEIGHT: 72 IN | HEART RATE: 75 BPM | DIASTOLIC BLOOD PRESSURE: 69 MMHG | SYSTOLIC BLOOD PRESSURE: 126 MMHG | TEMPERATURE: 97.8 F | RESPIRATION RATE: 16 BRPM | OXYGEN SATURATION: 95 %

## 2025-06-16 DIAGNOSIS — C15.9 ESOPHAGEAL CARCINOMA (HCC): ICD-10-CM

## 2025-06-16 DIAGNOSIS — C15.5 MALIGNANT NEOPLASM OF LOWER THIRD OF ESOPHAGUS (HCC): Primary | ICD-10-CM

## 2025-06-16 DIAGNOSIS — G43.709 CHRONIC MIGRAINE WITHOUT AURA WITHOUT STATUS MIGRAINOSUS, NOT INTRACTABLE: ICD-10-CM

## 2025-06-16 DIAGNOSIS — K20.90 ESOPHAGITIS: ICD-10-CM

## 2025-06-16 DIAGNOSIS — C15.9 ESOPHAGEAL CARCINOMA (HCC): Primary | ICD-10-CM

## 2025-06-16 PROCEDURE — 76937 US GUIDE VASCULAR ACCESS: CPT

## 2025-06-16 PROCEDURE — 77001 FLUOROGUIDE FOR VEIN DEVICE: CPT

## 2025-06-16 PROCEDURE — 36561 INSERT TUNNELED CV CATH: CPT

## 2025-06-16 PROCEDURE — 99152 MOD SED SAME PHYS/QHP 5/>YRS: CPT | Performed by: RADIOLOGY

## 2025-06-16 PROCEDURE — 77001 FLUOROGUIDE FOR VEIN DEVICE: CPT | Performed by: RADIOLOGY

## 2025-06-16 PROCEDURE — 99153 MOD SED SAME PHYS/QHP EA: CPT

## 2025-06-16 PROCEDURE — 36561 INSERT TUNNELED CV CATH: CPT | Performed by: RADIOLOGY

## 2025-06-16 PROCEDURE — C1788 PORT, INDWELLING, IMP: HCPCS

## 2025-06-16 PROCEDURE — 76937 US GUIDE VASCULAR ACCESS: CPT | Performed by: RADIOLOGY

## 2025-06-16 PROCEDURE — 99152 MOD SED SAME PHYS/QHP 5/>YRS: CPT

## 2025-06-16 RX ORDER — SODIUM CHLORIDE 9 MG/ML
30 INJECTION, SOLUTION INTRAVENOUS CONTINUOUS
Status: DISCONTINUED | OUTPATIENT
Start: 2025-06-16 | End: 2025-06-20 | Stop reason: HOSPADM

## 2025-06-16 RX ORDER — FENTANYL CITRATE 50 UG/ML
INJECTION, SOLUTION INTRAMUSCULAR; INTRAVENOUS AS NEEDED
Status: COMPLETED | OUTPATIENT
Start: 2025-06-16 | End: 2025-06-16

## 2025-06-16 RX ORDER — MIDAZOLAM HYDROCHLORIDE 2 MG/2ML
INJECTION, SOLUTION INTRAMUSCULAR; INTRAVENOUS AS NEEDED
Status: COMPLETED | OUTPATIENT
Start: 2025-06-16 | End: 2025-06-16

## 2025-06-16 RX ORDER — DEXAMETHASONE 4 MG/1
8 TABLET ORAL 2 TIMES DAILY WITH MEALS
Qty: 12 TABLET | Refills: 3 | Status: SHIPPED | OUTPATIENT
Start: 2025-06-16

## 2025-06-16 RX ORDER — LIDOCAINE HYDROCHLORIDE AND EPINEPHRINE 10; 10 MG/ML; UG/ML
INJECTION, SOLUTION INFILTRATION; PERINEURAL AS NEEDED
Status: COMPLETED | OUTPATIENT
Start: 2025-06-16 | End: 2025-06-16

## 2025-06-16 RX ADMIN — MIDAZOLAM HYDROCHLORIDE 1 MG: 1 INJECTION, SOLUTION INTRAMUSCULAR; INTRAVENOUS at 09:09

## 2025-06-16 RX ADMIN — MIDAZOLAM HYDROCHLORIDE 1 MG: 1 INJECTION, SOLUTION INTRAMUSCULAR; INTRAVENOUS at 09:01

## 2025-06-16 RX ADMIN — LIDOCAINE HYDROCHLORIDE,EPINEPHRINE BITARTRATE 20 ML: 10; .01 INJECTION, SOLUTION INFILTRATION; PERINEURAL at 09:05

## 2025-06-16 RX ADMIN — FENTANYL CITRATE 50 MCG: 50 INJECTION, SOLUTION INTRAMUSCULAR; INTRAVENOUS at 09:01

## 2025-06-16 RX ADMIN — VANCOMYCIN HYDROCHLORIDE 1500 MG: 750 INJECTION, POWDER, LYOPHILIZED, FOR SOLUTION INTRAVENOUS at 08:03

## 2025-06-16 RX ADMIN — SODIUM CHLORIDE 30 ML/HR: 0.9 INJECTION, SOLUTION INTRAVENOUS at 09:10

## 2025-06-16 RX ADMIN — FENTANYL CITRATE 50 MCG: 50 INJECTION, SOLUTION INTRAMUSCULAR; INTRAVENOUS at 09:09

## 2025-06-16 NOTE — TELEPHONE ENCOUNTER
Received VM from Yoni that he is getting his port placed this morning and isn't sure what time he will be done and if he can make out appt at 11 am on time. Called Yoni back. No answer. Left OSCAR and asked him to call me when he's done with his port placement to potentially change our appt time or reschedule if needed.

## 2025-06-16 NOTE — TELEPHONE ENCOUNTER
Received call from Yoni. He stated he was on his way home from his port placement. Will reschedule RD appt for next week. Appt rescheduled for 6/25 at 2 pm.

## 2025-06-16 NOTE — BRIEF OP NOTE (RAD/CATH)
INTERVENTIONAL RADIOLOGY PROCEDURE NOTE    Date: 6/16/2025    Procedure:   Procedure Summary       Date: 06/16/25 Room / Location: [facility] Interventional Radiology    Anesthesia Start:  Anesthesia Stop:     Procedure: IR PORT PLACEMENT Diagnosis:       Esophageal carcinoma (HCC)      (chemotherapy)    Scheduled Providers:  Responsible Provider:     Anesthesia Type: Not recorded ASA Status: Not recorded            Preoperative diagnosis:   1. Esophageal carcinoma (HCC)         Postoperative diagnosis: Same.    Surgeon: Alexandre Sandoval MD     Assistant: None. No qualified resident was available.    Blood loss: Minimal    Specimens: None     Findings: Right chest port placement, ready for use.    Complications: None immediate.    Anesthesia: conscious sedation

## 2025-06-16 NOTE — INTERVAL H&P NOTE
Update: (This section must be completed if the H&P was completed greater than 24 hrs to procedure or admission)    H&P reviewed. After examining the patient, I find no changed to the H&P since it had been written.    Patient re-evaluated. Accept as history and physical.    Alexandre Sandoval MD/June 16, 2025/9:48 AM

## 2025-06-16 NOTE — SEDATION DOCUMENTATION
Pt tolerated right port placement. Vitals stable. Sutures and exofin to site. Pt returning to [facility] for recovery.

## 2025-06-16 NOTE — DISCHARGE INSTRUCTIONS
Implanted Venous Access Port     WHAT YOU NEED TO KNOW:   An implanted venous access port is a device used to give treatments and take blood. It may also be called a central venous access device (CVAD). The port is a small container that is placed under your skin, usually in your upper chest. The port is attached to a catheter that enters a large vein.   DISCHARGE INSTRUCTIONS:   Resume your normal diet. Small sips of flat soda will help with mild nausea.  Prevent an infection:   Wash your hands often.  Use soap and water. Clean your hands before and after you care for your port. Remind everyone who cares for your port to wash their hands.   Check your skin for infection every day.  Look for redness, swelling, or fluid oozing from the port site.  Care for your port:   1. You may shower beginning 48 hours after procedure.     2.  Leave glue in place.    3. It is normal for some bruising to occur.    4. Use Tylenol for pain.    5. Limit use of arm on the side that your port was placed. Lift nothing heavier than 5 pounds for 1 week, and then gradually increase activity as tolerated.    6. DO NOT apply ointment, lotion or cream to port site until incision is healed. Allow glue to fall off. DO NOT attempt to peel glue from skin even it it begins to flake.     7. After the port incision is healed you may swim, bathe.  Notify the Interventional Radiologist if you have any of the followin. Fever above 101 F    2. Increased redness or swelling after 1st day.     3. Increased pain after 1st day.    4. Any sign of infection (drainage from port site, skin separation, hot to touch).    5. Persistent nausea or vomiting.    Contact Interventional Radiology at [phone] (ANDRES PATIENTS: Contact Interventional Radiology at [phone]) (MODE PATIENTS: Contact Interventional Radiology at [phone]).

## 2025-06-17 ENCOUNTER — ANESTHESIA (OUTPATIENT)
Dept: GASTROENTEROLOGY | Facility: HOSPITAL | Age: 60
End: 2025-06-17
Payer: MEDICARE

## 2025-06-17 ENCOUNTER — ANESTHESIA EVENT (OUTPATIENT)
Dept: GASTROENTEROLOGY | Facility: HOSPITAL | Age: 60
End: 2025-06-17
Payer: MEDICARE

## 2025-06-17 ENCOUNTER — HOSPITAL ENCOUNTER (OUTPATIENT)
Dept: GASTROENTEROLOGY | Facility: HOSPITAL | Age: 60
Setting detail: OUTPATIENT SURGERY
Discharge: HOME | End: 2025-06-17
Attending: STUDENT IN AN ORGANIZED HEALTH CARE EDUCATION/TRAINING PROGRAM
Payer: MEDICARE

## 2025-06-17 VITALS
OXYGEN SATURATION: 94 % | TEMPERATURE: 97.5 F | WEIGHT: 280 LBS | SYSTOLIC BLOOD PRESSURE: 121 MMHG | HEIGHT: 72 IN | RESPIRATION RATE: 16 BRPM | DIASTOLIC BLOOD PRESSURE: 75 MMHG | HEART RATE: 74 BPM | BODY MASS INDEX: 37.93 KG/M2

## 2025-06-17 DIAGNOSIS — C15.5 MALIGNANT NEOPLASM OF LOWER THIRD OF ESOPHAGUS (HCC): ICD-10-CM

## 2025-06-17 LAB — GLUCOSE SERPL-MCNC: 223 MG/DL (ref 65–140)

## 2025-06-17 PROCEDURE — 43237 ENDOSCOPIC US EXAM ESOPH: CPT | Performed by: INTERNAL MEDICINE

## 2025-06-17 PROCEDURE — 82948 REAGENT STRIP/BLOOD GLUCOSE: CPT

## 2025-06-17 RX ORDER — ONDANSETRON 2 MG/ML
4 INJECTION INTRAMUSCULAR; INTRAVENOUS ONCE
Status: COMPLETED | OUTPATIENT
Start: 2025-06-17 | End: 2025-06-17

## 2025-06-17 RX ORDER — GLYCOPYRROLATE 0.2 MG/ML
INJECTION INTRAMUSCULAR; INTRAVENOUS AS NEEDED
Status: DISCONTINUED | OUTPATIENT
Start: 2025-06-17 | End: 2025-06-17

## 2025-06-17 RX ORDER — PROPOFOL 10 MG/ML
INJECTION, EMULSION INTRAVENOUS CONTINUOUS PRN
Status: DISCONTINUED | OUTPATIENT
Start: 2025-06-17 | End: 2025-06-17

## 2025-06-17 RX ORDER — LIDOCAINE HYDROCHLORIDE 10 MG/ML
0.5 INJECTION, SOLUTION EPIDURAL; INFILTRATION; INTRACAUDAL; PERINEURAL ONCE AS NEEDED
Status: CANCELLED | OUTPATIENT
Start: 2025-06-17

## 2025-06-17 RX ORDER — PANTOPRAZOLE SODIUM 40 MG/1
40 TABLET, DELAYED RELEASE ORAL
Qty: 60 TABLET | Refills: 3 | Status: SHIPPED | OUTPATIENT
Start: 2025-06-17

## 2025-06-17 RX ORDER — PROPOFOL 10 MG/ML
INJECTION, EMULSION INTRAVENOUS AS NEEDED
Status: DISCONTINUED | OUTPATIENT
Start: 2025-06-17 | End: 2025-06-17

## 2025-06-17 RX ORDER — SODIUM CHLORIDE 9 MG/ML
25 INJECTION, SOLUTION INTRAVENOUS CONTINUOUS
Status: CANCELLED | OUTPATIENT
Start: 2025-06-17

## 2025-06-17 RX ORDER — SODIUM CHLORIDE 9 MG/ML
INJECTION, SOLUTION INTRAVENOUS CONTINUOUS PRN
Status: DISCONTINUED | OUTPATIENT
Start: 2025-06-17 | End: 2025-06-17

## 2025-06-17 RX ORDER — LIDOCAINE HYDROCHLORIDE 10 MG/ML
INJECTION, SOLUTION EPIDURAL; INFILTRATION; INTRACAUDAL; PERINEURAL AS NEEDED
Status: DISCONTINUED | OUTPATIENT
Start: 2025-06-17 | End: 2025-06-17

## 2025-06-17 RX ADMIN — PROPOFOL 50 MG: 10 INJECTION, EMULSION INTRAVENOUS at 07:47

## 2025-06-17 RX ADMIN — ONDANSETRON 4 MG: 2 INJECTION, SOLUTION INTRAMUSCULAR; INTRAVENOUS at 08:44

## 2025-06-17 RX ADMIN — PROPOFOL 30 MG: 10 INJECTION, EMULSION INTRAVENOUS at 07:58

## 2025-06-17 RX ADMIN — GLYCOPYRROLATE 0.2 MG: 0.2 INJECTION, SOLUTION INTRAMUSCULAR; INTRAVENOUS at 07:44

## 2025-06-17 RX ADMIN — LIDOCAINE HYDROCHLORIDE 50 MG: 10 INJECTION, SOLUTION EPIDURAL; INFILTRATION; INTRACAUDAL at 07:44

## 2025-06-17 RX ADMIN — PROPOFOL 150 MG: 10 INJECTION, EMULSION INTRAVENOUS at 07:44

## 2025-06-17 RX ADMIN — SODIUM CHLORIDE: 0.9 INJECTION, SOLUTION INTRAVENOUS at 07:39

## 2025-06-17 RX ADMIN — PROPOFOL 120 MCG/KG/MIN: 10 INJECTION, EMULSION INTRAVENOUS at 07:47

## 2025-06-17 NOTE — ANESTHESIA POSTPROCEDURE EVALUATION
Post-Op Assessment Note    CV Status:  Stable  Pain Score: 0    Pain management: adequate       Mental Status:  Alert, awake and sleepy   Hydration Status:  Euvolemic   PONV Controlled:  Controlled   Airway Patency:  Patent  Two or more mitigation strategies used for obstructive sleep apnea   Post Op Vitals Reviewed: Yes    No anethesia notable event occurred.    Staff: CRNA           Last Filed PACU Vitals:  Vitals Value Taken Time   Temp 97.5 °F (36.4 °C) 06/17/25 08:05   Pulse 77 06/17/25 08:05   /61 06/17/25 08:05   Resp 16 06/17/25 08:05   SpO2 98 % 06/17/25 08:05       Modified Rudy:     Vitals Value Taken Time   Activity 2 06/17/25 08:05   Respiration 2 06/17/25 08:05   Circulation 2 06/17/25 08:05   Consciousness 0 06/17/25 08:05   Oxygen Saturation 1 06/17/25 08:05     Modified Rudy Score: 7

## 2025-06-17 NOTE — H&P
History and Physical -  [facility]  Yoni Castillo 60 y.o. male MRN: [id number]                  HPI: Yoni Castillo is a 60 y.o. year old male who presents for EUS      REVIEW OF SYSTEMS: Per the HPI, and otherwise unremarkable.    Historical Information   Past Medical History[1]  Past Surgical History[2]  Social History   Social History     Substance and Sexual Activity   Alcohol Use Yes    Alcohol/week: 0.0 - 1.0 standard drinks of alcohol    Comment: ocassional     Social History     Substance and Sexual Activity   Drug Use Never     Tobacco Use History[3]  Family History[4]    Meds/Allergies     Current Medications[5]    Allergies[6]    Objective     /75   Pulse 73   Temp (!) 96.7 °F (35.9 °C) (Tympanic)   Resp 18   Ht 6' (1.829 m)   Wt 127 kg (280 lb)   SpO2 97%   BMI 37.97 kg/m²       PHYSICAL EXAM    Gen: NAD  Head: NCAT  CV: RRR  CHEST: Clear  ABD: soft, NT/ND  EXT: no edema      ASSESSMENT/PLAN:  This is a 60 y.o. year old male here for EUS, and he is stable and optimized for his procedure.             [1]   Past Medical History:  Diagnosis Date    Allergic 1968    Seasonal    Arthritis 2012    Brain concussion Multiple    Cervical disc disease     Chronic kidney disease 2012    Chronic pain disorder     CTS (carpal tunnel syndrome) 2002    Diabetes mellitus (HCC)     Esophageal cancer (HCC)     Full dentures     only using upper    Head injury 1975    Headache(784.0) 1980    High blood sugar     Hypertension     Kidney stone     Liver disease     Low back pain     Cannot remember    Lumbar disc disease     Lumbosacral disc disease     Cannot remember    Migraines     Neuropathy in diabetes (HCC)     RLS (restless legs syndrome)     Skin cancer     in past    Thoracic disc disorder     Cannot remember    Visual impairment 2017    Cataracts. Surgery to correct in 2021   [2]   Past Surgical History:  Procedure Laterality Date    AMPUTATION  2022    Little toe left    CARPAL  TUNNEL RELEASE  2002    CATARACT EXTRACTION Bilateral     COLONOSCOPY      CONTRACTURE Right 01/20/2025    Procedure: Right wrist and hand extensor tenolysis and wrist capsulotomy;  Surgeon: Carroll Mccarthy MD;  [facility];  Service: Orthopedics    EYE SURGERY  2021    FOOT SURGERY  2022    Left Foot    FRACTURE SURGERY  1968    Right Tib/Fib    GANGLION CYST EXCISION Left 03/25/2024    Procedure: EXCISION MUCOID CYST, INDEX FINGER DIP;  Surgeon: Carroll Mccarthy MD;  [facility];  Service: Orthopedics    GANGLION CYST EXCISION Right 05/20/2024    Procedure: EXCISION MUCOID CYST - RIGHT INDEX AND MIDDLE FINGERS;  Surgeon: Carroll Mccarthy MD;  [facility];  Service: Orthopedics    HERNIA REPAIR      INCISION AND DRAINAGE  01/16/2024    IR NEPHROURETERAL ACCESS FOR UROLOGY PCNL  04/07/2025    KIDNEY SURGERY Right 06/2012    KNEE SURGERY Right 1980    MOUTH SURGERY      WV AMPUTATION METATARSAL W/TOE SINGLE Left 6/1/2022    Procedure: RAY RESECTION FOOT;  Surgeon: Hannah Melgoza DPM;  [facility];  Service: Podiatry    WV INCISION & DRAINAGE ABSCESS COMPLICATED/MULTIPLE Right 09/17/2024    Procedure: Irrigation and debridement right wrist and hand, any indicated procedures;  Surgeon: Carroll Mccarthy MD;  [facility];  Service: Orthopedics    WV INCISION EXTENSOR TENDON SHEATH WRIST Right 01/20/2025    Procedure: RELEASE DEQUERVAINS - Right;  Surgeon: Carroll Mccarthy MD;  [facility];  Service: Orthopedics    WV PERQ NL/PL LITHOTRP COMPLEX >2 CM MLT LOCATIONS Right 04/11/2025    Procedure: NEPHROLITHOTOMY  PERCUTANEOUS (PCNL);  Surgeon: Adis Hamilton MD;  [facility];  Service: Urology    WV RPR AA HERNIA 1ST < 3 CM REDUCIBLE N/A 7/14/2023    Procedure: REPAIR HERNIA INCISIONAL;  Surgeon: Matt Melo MD;  [facility];  Service: General    WV SYNOVECTOMY EXTENSOR TENDON SHTH WRIST 1 CMPRT Right  10/03/2024    Procedure: Irrigation and debridement, right wrist, volar and dorsal, possible extensor and flexor tenosynovectomy, any indicated procedures;  Surgeon: Carroll Mccarthy MD;  [facility];  Service: Orthopedics    HI SYNOVECTOMY EXTENSOR TENDON SHTH WRIST 1 CMPRT Right 03/24/2025    Procedure: SYNOVECTOMY / DEBRIDEMENT - FLEXOR CARPI RADIALIS AND FLEXOR CARPI ULNARIS TENDONS- RIGHT;  Surgeon: Carroll Mccarthy MD;  [facility];  Service: Orthopedics    TONSILLECTOMY  1968    Yes    WOUND DEBRIDEMENT Left 4/28/2022    Procedure: Left foot washout;  Surgeon: Hannah Melgoza DPM;  [facility];  Service: Podiatry    WOUND DEBRIDEMENT Left 6/6/2022    Procedure: DEBRIDEMENT WOUND (WASH OUT);  Surgeon: Hannah Melgoza DPM;  [facility];  Service: Podiatry   [3]   Social History  Tobacco Use   Smoking Status Never   Smokeless Tobacco Never   [4]   Family History  Problem Relation Name Age of Onset    Diabetes Paternal Grandmother Michelle     Diabetes Paternal Grandfather Maurice     Arthritis Maternal Grandmother Greatgrandmother  Corie    [5]   Current Outpatient Medications:     amitriptyline (ELAVIL) 10 mg tablet    carvedilol (COREG) 25 mg tablet    hydroCHLOROthiazide 12.5 mg tablet    lisinopril-hydrochlorothiazide (PRINZIDE,ZESTORETIC) 20-12.5 MG per tablet    pantoprazole (PROTONIX) 40 mg tablet    traMADol (Ultram) 50 mg tablet    Ubrogepant (UBRELVY) 100 MG tablet    acetaminophen (TYLENOL) 500 mg tablet    ammonium lactate (LAC-HYDRIN) 12 % lotion    dexamethasone (DECADRON) 4 mg tablet    Empagliflozin (Jardiance) 25 MG TABS    [START ON 6/25/2025] fluorouracil 6,420 mg in CADD/Elastomeric Infusion Device    ketoconazole (NIZORAL) 2 % cream    lidocaine (Lidoderm) 5 %    lidocaine (LMX) 4 % cream    lidocaine-prilocaine (EMLA) cream    methocarbamol (ROBAXIN) 750 mg tablet    ondansetron (ZOFRAN-ODT) 8 mg disintegrating tablet    rivaroxaban (Xarelto  Starter Pack) 15 & 20 MG starter pack  No current facility-administered medications for this encounter.    Facility-Administered Medications Ordered in Other Encounters:     sodium chloride 0.9 % infusion, 30 mL/hr, Intravenous, Continuous, 30 mL/hr at 06/16/25 0910  [6]   Allergies  Allergen Reactions    Cephalexin Hives     Skin peeling  Tolerates penicillins (tolerated unasyn and zosyn)    Metformin GI Intolerance    Pollen Extract Sneezing

## 2025-06-17 NOTE — ANESTHESIA PREPROCEDURE EVALUATION
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         Additional Monitors:     Airway Plan: natural airway.           Plan Factors-    Chart reviewed. EKG reviewed.  Existing labs reviewed.     Patient is not a current smoker.  Patient did not smoke on day of surgery.            Induction- intravenous.    Postoperative Plan- .   Monitoring Plan - Monitoring plan - standard ASA monitoring  Post Operative Pain Plan - non-opiod analgesics        Informed Consent- Anesthetic plan and risks discussed with patient.        NPO Status:  Vitals Value Taken Time   Date of last liquid 06/16/25 06/17/25 07:01   Time of last liquid 2200 06/17/25 07:01   Date of last solid 06/16/25 06/17/25 07:01   Time of last solid 2000 06/17/25 07:01         
wear glasses

## 2025-06-18 ENCOUNTER — DOCUMENTATION (OUTPATIENT)
Dept: HEMATOLOGY ONCOLOGY | Facility: CLINIC | Age: 60
End: 2025-06-18

## 2025-06-18 NOTE — PROGRESS NOTES
Thoracic Surgeon: Rashel  Surgical Oncologist: Dorian  Medical Oncologist: Scarlet  Radiation Oncologist: Fan    Regimen: FLOT    Chemo Start date: anticipated start date 6/25/25    Planned EOT date: 8/7/25    Pet scan date: TBD  (With FLOT, PET due 4 weeks after CTX)  (With CTX/RT, PET 4 weeks after radiation)    PET scan follow up/Pre-op appt: TBD    Surgical Window: 9/18/25 - 10/2/25   (If FLOT, surgery 6-8 weeks after treatment)  (With CTX/RT, surgery 6-8 weeks after treatment)    Message surgery scheduler and all additional parties:   Brenda Reed ([facility])   Rita Jamison ([facility])   Astrid Osorio ([facility])     Thoracic Surgery Schedulers: Bela Cross    [facility]: If there is a delay to chemo and/or radiation, please update the care team here and provide new EOT dates

## 2025-06-19 ENCOUNTER — PATIENT OUTREACH (OUTPATIENT)
Dept: CASE MANAGEMENT | Facility: OTHER | Age: 60
End: 2025-06-19

## 2025-06-20 ENCOUNTER — TELEPHONE (OUTPATIENT)
Dept: HEMATOLOGY ONCOLOGY | Facility: CLINIC | Age: 60
End: 2025-06-20

## 2025-06-20 NOTE — TELEPHONE ENCOUNTER
Informed Patient of Upcoming appt on Mon 6/23 with Dr. Novak, Patient has labs that needs done prior to Visit.    [place] # was given for any question, comment or concerns.

## 2025-06-21 ENCOUNTER — APPOINTMENT (OUTPATIENT)
Dept: LAB | Facility: HOSPITAL | Age: 60
End: 2025-06-21
Payer: MEDICARE

## 2025-06-21 DIAGNOSIS — C15.5 MALIGNANT NEOPLASM OF LOWER THIRD OF ESOPHAGUS (HCC): ICD-10-CM

## 2025-06-21 DIAGNOSIS — C15.9 ESOPHAGEAL CARCINOMA (HCC): ICD-10-CM

## 2025-06-21 DIAGNOSIS — I82.412 ACUTE DEEP VEIN THROMBOSIS (DVT) OF FEMORAL VEIN OF LEFT LOWER EXTREMITY (HCC): ICD-10-CM

## 2025-06-21 LAB
ALBUMIN SERPL BCG-MCNC: 4.2 G/DL (ref 3.5–5)
ALP SERPL-CCNC: 55 U/L (ref 34–104)
ALT SERPL W P-5'-P-CCNC: 11 U/L (ref 7–52)
ANION GAP SERPL CALCULATED.3IONS-SCNC: 8 MMOL/L (ref 4–13)
AST SERPL W P-5'-P-CCNC: 13 U/L (ref 13–39)
BASOPHILS # BLD AUTO: 0.02 THOUSANDS/ÂΜL (ref 0–0.1)
BASOPHILS NFR BLD AUTO: 0 % (ref 0–1)
BILIRUB SERPL-MCNC: 0.41 MG/DL (ref 0.2–1)
BUN SERPL-MCNC: 35 MG/DL (ref 5–25)
CALCIUM SERPL-MCNC: 9.6 MG/DL (ref 8.4–10.2)
CEA SERPL-MCNC: 12.2 NG/ML (ref 0–3)
CHLORIDE SERPL-SCNC: 96 MMOL/L (ref 96–108)
CO2 SERPL-SCNC: 29 MMOL/L (ref 21–32)
CREAT SERPL-MCNC: 1.84 MG/DL (ref 0.6–1.3)
EOSINOPHIL # BLD AUTO: 0.16 THOUSAND/ÂΜL (ref 0–0.61)
EOSINOPHIL NFR BLD AUTO: 4 % (ref 0–6)
ERYTHROCYTE [DISTWIDTH] IN BLOOD BY AUTOMATED COUNT: 12.1 % (ref 11.6–15.1)
GFR SERPL CREATININE-BSD FRML MDRD: 38 ML/MIN/1.73SQ M
GLUCOSE SERPL-MCNC: 237 MG/DL (ref 65–140)
HCT VFR BLD AUTO: 38 % (ref 36.5–49.3)
HGB BLD-MCNC: 12.6 G/DL (ref 12–17)
IMM GRANULOCYTES # BLD AUTO: 0.02 THOUSAND/UL (ref 0–0.2)
IMM GRANULOCYTES NFR BLD AUTO: 0 % (ref 0–2)
LYMPHOCYTES # BLD AUTO: 0.95 THOUSANDS/ÂΜL (ref 0.6–4.47)
LYMPHOCYTES NFR BLD AUTO: 21 % (ref 14–44)
MCH RBC QN AUTO: 29.8 PG (ref 26.8–34.3)
MCHC RBC AUTO-ENTMCNC: 33.2 G/DL (ref 31.4–37.4)
MCV RBC AUTO: 90 FL (ref 82–98)
MONOCYTES # BLD AUTO: 0.5 THOUSAND/ÂΜL (ref 0.17–1.22)
MONOCYTES NFR BLD AUTO: 11 % (ref 4–12)
NEUTROPHILS # BLD AUTO: 2.9 THOUSANDS/ÂΜL (ref 1.85–7.62)
NEUTS SEG NFR BLD AUTO: 64 % (ref 43–75)
NRBC BLD AUTO-RTO: 0 /100 WBCS
PLATELET # BLD AUTO: 219 THOUSANDS/UL (ref 149–390)
PMV BLD AUTO: 9.8 FL (ref 8.9–12.7)
POTASSIUM SERPL-SCNC: 5 MMOL/L (ref 3.5–5.3)
PROT SERPL-MCNC: 7.3 G/DL (ref 6.4–8.4)
RBC # BLD AUTO: 4.23 MILLION/UL (ref 3.88–5.62)
SODIUM SERPL-SCNC: 133 MMOL/L (ref 135–147)
WBC # BLD AUTO: 4.55 THOUSAND/UL (ref 4.31–10.16)

## 2025-06-21 PROCEDURE — 80053 COMPREHEN METABOLIC PANEL: CPT

## 2025-06-21 PROCEDURE — 85025 COMPLETE CBC W/AUTO DIFF WBC: CPT

## 2025-06-21 PROCEDURE — 36415 COLL VENOUS BLD VENIPUNCTURE: CPT

## 2025-06-21 PROCEDURE — 82378 CARCINOEMBRYONIC ANTIGEN: CPT

## 2025-06-23 ENCOUNTER — OFFICE VISIT (OUTPATIENT)
Dept: HEMATOLOGY ONCOLOGY | Facility: CLINIC | Age: 60
End: 2025-06-23
Payer: MEDICARE

## 2025-06-23 ENCOUNTER — TELEPHONE (OUTPATIENT)
Dept: ENDOCRINOLOGY | Facility: CLINIC | Age: 60
End: 2025-06-23

## 2025-06-23 ENCOUNTER — HOSPITAL ENCOUNTER (OUTPATIENT)
Dept: NUCLEAR MEDICINE | Facility: HOSPITAL | Age: 60
Discharge: HOME | End: 2025-06-23
Attending: STUDENT IN AN ORGANIZED HEALTH CARE EDUCATION/TRAINING PROGRAM
Payer: MEDICARE

## 2025-06-23 VITALS
BODY MASS INDEX: 37.93 KG/M2 | HEIGHT: 72 IN | WEIGHT: 280 LBS | HEART RATE: 78 BPM | DIASTOLIC BLOOD PRESSURE: 60 MMHG | RESPIRATION RATE: 18 BRPM | OXYGEN SATURATION: 91 % | SYSTOLIC BLOOD PRESSURE: 90 MMHG | TEMPERATURE: 97.9 F

## 2025-06-23 DIAGNOSIS — C15.5 MALIGNANT NEOPLASM OF LOWER THIRD OF ESOPHAGUS (HCC): Primary | ICD-10-CM

## 2025-06-23 DIAGNOSIS — E11.22 TYPE 2 DIABETES MELLITUS WITH CHRONIC KIDNEY DISEASE, WITHOUT LONG-TERM CURRENT USE OF INSULIN, UNSPECIFIED CKD STAGE (HCC): ICD-10-CM

## 2025-06-23 DIAGNOSIS — I82.412 DEEP VEIN THROMBOSIS (DVT) OF FEMORAL VEIN OF LEFT LOWER EXTREMITY, UNSPECIFIED CHRONICITY (HCC): ICD-10-CM

## 2025-06-23 LAB
GLUCOSE SERPL-MCNC: 244 MG/DL (ref 65–140)
GLUCOSE SERPL-MCNC: 254 MG/DL (ref 65–140)

## 2025-06-23 PROCEDURE — 99213 OFFICE O/P EST LOW 20 MIN: CPT | Performed by: INTERNAL MEDICINE

## 2025-06-23 PROCEDURE — 82948 REAGENT STRIP/BLOOD GLUCOSE: CPT

## 2025-06-23 NOTE — PROGRESS NOTES
HEMATOLOGY ONCOLOGY STAFF PHYSICIAN ATTESTATION   I have personally interviewed and examined Nella Castillo with  Dr. Carroll Mercer. I have reviewed and discussed the HPI, assessment, and oncologic management plan formulated by Dr. Mercer.  I concur with his assessment and management plan.    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  The patient presented to the emergency department in mid May 2025 with progressive dysphagia to solids, upper abdominal pain, nausea and vomiting.  Initial pathologic diagnosis of invasive moderately to poorly differentiated adenocarcinoma with signet ring and squamous cell features of the distal esophagus was established on May 19, 2025.  The patient is referred to medical oncology clinic for consideration of multimodality treatment of distal esophageal cancer.     The patient's primary malignancy of the distal esophagus is predominantly a poorly differentiated adenocarcinoma with squamous and signet ring cell features.  Therefore, I recommend nikki-operative chemotherapy with infusional 5-fluorouracil, docetaxel, and oxaliplatin (FLOT) plus minus immunotherapy with the anti-PD-L1 agent durvalumab (Imfinzi).  If durvalumab is not approved by the patient's insurance, he will receive 4 cycles of neoadjuvant FLOT followed by surgical resection of the primary poorly differentiated adenocarcinoma of the distal esophagus and regional lymph node dissection, followed by 4 cycles of adjuvant FLOT, followed by surveillance.  I discussed with the patient clinical benefits and risks of perioperative FLOT.  Clinical benefits outweigh any  potential treatment related adverse events or toxicity. Before initiation of perioperative chemotherapy, Mr. Castillo needs to complete clinical staging for his locally advanced invasive, poorly differentiated adenocarcinoma of the distal esophagus.      Interim Assessment: On June 17, 2025, EGD plus EUS showed hypoechoic T2N1 mass covering the entire circumference in the lower third of the esophagus, observed at 39 cm from incisors, extending to the muscularis propria with an indeterminate layer origin. A small 9 mm lymph node in the paraesophageal region was identified. Today, FDG PET CT scan could not be performed because of uncontrolled type 2 diabetes mellitus. Mr. Castillo is ready to initiate neoadjuvant chemo-immunotherapy.     Plan:  Initiate neoadjuvant FLOT plus durvalumab within the next 2 weeks.  In the neoadjuvant portion of treatment, the patient will receive 2 cycles of FLOT with durvalumab on cycle 1.  Subsequently, he will undergo surgical resection of the primary adenocarcinoma of the esophagus with regional lymph node dissection.  After recovering from surgery, he will receive 2 cycles of adjuvant FLOT with durvalumab on cycle #1 of adjuvant therapy.  After 2 cycles of adjuvant chemo-immunotherapy, he will receive a year-long maintenance immunotherapy with single agent durvalumab 1500 mg intravenously every 4 weeks (total of 10 cycles of maintenance immunotherapy).  FDG PET CT scan if possible  Periodic monitoring of serum CEA  Return to medical oncology clinic for history and physical exam in 2 weeks for history and physical exam and for assessment of safety and tolerability of FLOT plus durvalumab    Acute deep vein thrombosis (DVT) of femoral vein of left lower extremity (HCC)  The patient has hypercoagulability of malignancy.  On Nanci 3, 2025 he was found to have acute deep vein thrombosis (DVT) of the left femoral vein.  He initiated systemic anticoagulation with rivaroxaban (Xarelto) 20 mg per  mouth daily.  I recommend to continue systemic anticoagulation with rivaroxaban 20 mg per mouth daily.  Duration of systemic anticoagulation is a minimum of 6 months.  He may hold systemic anticoagulation 3 days before placement of chemotherapy port.        Mr. Castillo understands and agrees with our management recommendations and plan of care. We answered questions to his satisfaction.     Daryl Novak MD

## 2025-06-23 NOTE — PROGRESS NOTES
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    Unable to receive PET scan 2025 due to high blood sugars; CT chest abd and pelvis from 2025 no signs of distant diease.    Discussed with patient amenable to proceeding with neoadjuvant therapy to prevent further delays waiting for PET scan.     NCCN guideline regimen  Q. 14-day cycles for total 2 cycles neoadjuvant   Docetaxel 50 mg/meter squared IV over 60 minutes on day 1  Oxaliplatin 85 mg/meter squared IV over 2 hours on day 1 concurrent with leucovorin 200 mg/meter squared IV over 2 hours on day 1  Followed by  Fluorouracil 260 mg/meter squared IV continuous infusion over 24 hours on day 1    Durvalumab 1500 mg IV over 60 minutes on day 1 Q28 days for 1 cycle neoadjuvant     Will follow-up prior to cycle 2 of FLOT   Will discuss with surgical-oncology and thoracic about adjustment in treatment regmin timeline    Deep vein thrombosis (DVT) of femoral vein of left lower extremity, unspecified chronicity (HCC)  Diagnosed 2025  Continue Xarelto 20 mg daily  Type 2 diabetes mellitus with chronic kidney disease, without long-term current use of insulin, unspecified CKD stage (HCC)    Lab Results   Component Value Date    HGBA1C 10.4 (H) 2025     Episodes of hyperglycemia  Patient discussing with Endocrine concerning management adjustment     Return  in about 2 weeks (around 7/7/2025).    History of Present Illness   Chief Complaint   Patient presents with    Follow-up     Initially presented May 17, 2025 to ED with history of progressive dysphagia x 2-week with CT chest abdomen pelvis showing mild wall thickening and hyperenhancement of the distal thoracic esophagus. EGD from May 19, 2025 showed single malignant mass in the lower one third of the esophagus with biopsy showed moderately to poorly differentiated  differentiated adenocarcinoma with squamous and signet ring cell features.  Immunohistochemistry, MMR proficient, MSI low, HER2 IHC 2+, FISH negative. Endoscopic ultrasound from 5/22/2025 showed hypoechoic T2 N1 mass visualized covering the entire circumference of the lower third of the esophagus observed, extending to the muscularis propria with small 9 mm lymph node in the paraesophageal region.    The patient had port placement on 6/16/2025.    The patient has hypercoagulability of malignancy. On Nanci 3, 2025 he was found to have acute deep vein thrombosis (DVT) of the left femoral vein. He initiated systemic anticoagulation with rivaroxaban (Xarelto) 20 mg per mouth daily.     Interval History:    Discussing with endocrine to help with diabetes  Tolerating PO intake  No complaints of acute illness  No complaints  of dehydration     Oncology History   Cancer Staging   Malignant neoplasm of lower third of esophagus (HCC)  Staging form: Esophagus - Adenocarcinoma, AJCC 8th Edition  - Clinical stage from 6/8/2025: Stage Unknown (cTX, cN0, cM0, G3) - Signed by Daryl Novak MD on 6/8/2025  Stage prefix: Initial diagnosis  Total positive nodes: 0  Histologic grading system: 3 grade system  Lymph-vascular invasion (LVI): Presence of LVI unknown/indeterminate  Diagnostic confirmation: Positive histology  Specimen type: Biopsy / Limited Resection  Staged by: Managing physician  Clinical staging modalities: Biopsy, CT  Perineural invasion (PNI): Unknown  Stage  used in treatment planning: Yes  National guidelines used in treatment planning: Yes  Oncology History   Malignant neoplasm of lower third of esophagus (HCC)   5/19/2025 Biopsy    B. Esophagogastric junction, mass, biopsy:  -Carcinoma, moderately to poorly differentiated, with glandular, signet ring cell and squamous features.       5/30/2025 Initial Diagnosis    Esophageal carcinoma (HCC)     6/8/2025 -  Cancer Staged    Staging form: Esophagus - Adenocarcinoma, AJCC 8th Edition  - Clinical stage from 6/8/2025: Stage Unknown (cTX, cN0, cM0, G3) - Signed by Daryl Novak MD on 6/8/2025  Stage prefix: Initial diagnosis  Total positive nodes: 0  Histologic grading system: 3 grade system  Lymph-vascular invasion (LVI): Presence of LVI unknown/indeterminate  Diagnostic confirmation: Positive histology  Specimen type: Biopsy / Limited Resection  Staged by: Managing physician  Clinical staging modalities: Biopsy, CT  Perineural invasion (PNI): Unknown  Stage used in treatment planning: Yes  National guidelines used in treatment planning: Yes       6/25/2025 -  Chemotherapy    durvalumab (IMFINZI) IVPB, 1,500 mg (100 % of original dose 1,500 mg), 0 of 1 cycle  Dose modification: 1,500 mg (original dose 1,500 mg, Cycle 1)  DOCEtaxel (TAXOTERE) chemo infusion, 50 mg/m2 = 123.6 mg, 0 of 4 cycles  leucovorin calcium IVPB, 200 mg/m2 = 494 mg, 0 of 4 cycles  oxaliplatin (ELOXATIN) chemo infusion, 85 mg/m2 = 209.95 mg, 0 of 4 cycles  fluorouracil (ADRUCIL) ambulatory infusion Soln, 2,600 mg/m2/day = 6,420 mg, 0 of 4 cycles        Pertinent Medical History   06/23/25:   Type 2 diabetes mellitus  History of incarcerated ventral hernia requiring repair x 2  Cervical radiculopathy  CKD stage IIIb   HTN       Review of Systems   All other systems reviewed and are negative.        Objective   BP 90/60 (BP Location: Left arm, Patient Position: Sitting, Cuff Size: Adult)   Pulse 78   Temp 97.9 °F (36.6 °C) (Temporal)   Resp 18   Ht  6' (1.829 m)   Wt 127 kg (280 lb)   SpO2 91%   BMI 37.97 kg/m²     Pain Screening:  Pain Score: 0-No pain  ECOG   0  Physical Exam  Constitutional:       General: He is not in acute distress.     Appearance: Normal appearance. He is obese.   HENT:      Head: Normocephalic and atraumatic.      Nose: Nose normal.      Mouth/Throat:      Mouth: Mucous membranes are moist.     Eyes:      Extraocular Movements: Extraocular movements intact.      Pupils: Pupils are equal, round, and reactive to light.       Cardiovascular:      Rate and Rhythm: Normal rate and regular rhythm.   Pulmonary:      Effort: Pulmonary effort is normal.      Breath sounds: No wheezing or rales.   Abdominal:      Palpations: Abdomen is soft. There is no mass.      Tenderness: There is no abdominal tenderness.     Musculoskeletal:         General: No tenderness.      Right lower leg: Edema present.      Left lower leg: Edema present.      Comments: Limited ROM     Neurological:      General: No focal deficit present.     Psychiatric:         Mood and Affect: Mood normal.       Labs: I have reviewed the following labs:  Lab Results   Component Value Date/Time    WBC 4.55 06/21/2025 11:34 AM    RBC 4.23 06/21/2025 11:34 AM    Hemoglobin 12.6 06/21/2025 11:34 AM    Hematocrit 38.0 06/21/2025 11:34 AM    MCV 90 06/21/2025 11:34 AM    MCH 29.8 06/21/2025 11:34 AM    RDW 12.1 06/21/2025 11:34 AM    Platelets 219 06/21/2025 11:34 AM    Segmented % 64 06/21/2025 11:34 AM    Lymphocytes % 21 06/21/2025 11:34 AM    Monocytes % 11 06/21/2025 11:34 AM    Eosinophils Relative 4 06/21/2025 11:34 AM    Basophils Relative 0 06/21/2025 11:34 AM    Immature Grans % 0 06/21/2025 11:34 AM    Absolute Neutrophils 2.90 06/21/2025 11:34 AM     Lab Results   Component Value Date/Time    Potassium 5.0 06/21/2025 11:34 AM    Chloride 96 06/21/2025 11:34 AM    CO2 29 06/21/2025 11:34 AM    CO2, i-STAT 27 10/03/2024 06:45 PM    BUN 35 (H) 06/21/2025 11:34 AM    Creatinine  1.84 (H) 06/21/2025 11:34 AM    Glucose, i-STAT 112 10/03/2024 06:45 PM    Glucose, Fasting 171 (H) 04/12/2025 05:41 AM    Calcium 9.6 06/21/2025 11:34 AM    AST 13 06/21/2025 11:34 AM    ALT 11 06/21/2025 11:34 AM    Alkaline Phosphatase 55 06/21/2025 11:34 AM    Total Protein 7.3 06/21/2025 11:34 AM    Albumin 4.2 06/21/2025 11:34 AM    Total Bilirubin 0.41 06/21/2025 11:34 AM    eGFR 38 06/21/2025 11:34 AM     CT chest abd and pelvis 5/17/2025:  IMPRESSION:  Mild wall thickening and hyperenhancement of the distal thoracic esophagus compatible with esophagitis. Ingested material retained within the mid thoracic esophagus. Gastroenterology consult recommended    Pathology: 5/19/2025  Final Diagnosis   A. Stomach, biopsy:  -Gastric antral mucosa with reactive/chemical gastropathy.  -Gastric oxyntic mucosa with no significant histopathologic change.   -Negative for Helicobacter pylori organisms on H&E stain.      B. Esophagogastric junction, mass, biopsy:  -Carcinoma, moderately to poorly differentiated, with glandular, signet ring cell and squamous features.  -MMR has been ordered, results to follow in an addendum.  -HER2 IHC has been ordered, results to follow in an addendum.      RESULTS OF IMMUNOHISTOCHEMICAL ANALYSIS FOR MISMATCH REPAIR PROTEIN LOSS - Block B1     INTERPRETATION: No loss of nuclear expression of MMR proteins: low probability of MSI-H     RESULTS:  Antibody            Clone                 Description                                  Results  MLH1                 G168                  Mismatch repair protein  Intact nuclear expression  MSH2                [id number]        Mismatch repair protein  Intact nuclear expression  MSH6                SP93                  Mismatch repair protein  Intact nuclear expression  PMS2                 EP51                  Mismatch repair protein  Intact nuclear expression     Note: A positive control for each antibody have been reviewed and accepted.       Addendum issued to report HER2 IHC results (see report in the media tab for further details).  HER2 IHC Score: 2+  HER2 FISH reflex testing is pending, results to follow in addendum.    Addendum issued to report HER2 FISH results (see report in the media tab for further details).  RESULTS: NEGATIVE  Interpretation:  Average HER2 signals/nucleus: 3.8  Average TRACEE 17 signals/nucleus: 2.6  HER2/CEN17 signal ratio: 1.5  Number of Observers: 1    Carroll Mercer DO  PGY-4 Hematology/Oncology Fellow

## 2025-06-23 NOTE — ASSESSMENT & PLAN NOTE
Lab Results   Component Value Date    HGBA1C 10.4 (H) 04/06/2025     Episodes of hyperglycemia  Patient discussing with Endocrine concerning management adjustment

## 2025-06-23 NOTE — ASSESSMENT & PLAN NOTE
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    Unable to receive PET scan 6/23/2025 due to high blood sugars; CT chest abd and pelvis from 5/17/2025 no signs of distant diease.    Discussed with patient amenable to proceeding with neoadjuvant therapy to prevent further delays waiting for PET scan.     NCCN guideline regimen  Q. 14-day cycles for total 2 cycles neoadjuvant   Docetaxel 50 mg/meter squared IV over 60 minutes on day 1  Oxaliplatin 85 mg/meter squared IV over 2 hours on day 1 concurrent with leucovorin 200 mg/meter squared IV over 2 hours on day 1  Followed by  Fluorouracil 260 mg/meter squared IV continuous infusion over 24 hours on day 1    Durvalumab 1500 mg IV over 60 minutes on day 1 Q28 days for 1 cycle neoadjuvant     Will follow-up prior to cycle 2 of FLOT   Will discuss with surgical-oncology and thoracic about adjustment in treatment regmin timeline

## 2025-06-23 NOTE — LETTER
2025     Chapis Alarcon MD  [address] [facility]  [address]  [facility] [address]    Patient: Yoni Castillo   YOB: 1965   Date of Visit: 2025       Dear Dr. Chapis Alarcon MD:    Thank you for referring Yoni Castillo to me for evaluation. Below are my notes for this consultation.    If you have questions, please do not hesitate to call me. I look forward to following your patient along with you.         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    Unable to receive PET scan 2025 due to high blood sugars; CT chest abd and pelvis from 2025 no signs of distant diease.    Discussed with patient amenable to proceeding with neoadjuvant therapy to prevent further delays waiting for PET scan.     NCCN guideline regimen  Q. 14-day cycles for total 2 cycles neoadjuvant   Docetaxel 50 mg/meter squared IV over 60 minutes on day 1  Oxaliplatin 85 mg/meter squared IV over 2 hours on day 1 concurrent with leucovorin 200 mg/meter squared IV over 2 hours on day 1  Followed by  Fluorouracil 260 mg/meter squared IV continuous infusion over 24 hours on day 1    Durvalumab 1500 mg IV over 60 minutes on day 1 Q28 days for 1 cycle neoadjuvant     Will follow-up prior to cycle 2 of FLOT    Will discuss with surgical-oncology and thoracic about adjustment in treatment regmin timeline    Deep vein thrombosis (DVT) of femoral vein of left lower extremity, unspecified chronicity (HCC)  Diagnosed 6/2025  Continue Xarelto 20 mg daily  Type 2 diabetes mellitus with chronic kidney disease, without long-term current use of insulin, unspecified CKD stage (HCC)    Lab Results   Component Value Date    HGBA1C 10.4 (H) 04/06/2025     Episodes of hyperglycemia  Patient discussing with Endocrine concerning management adjustment     Return in about 2 weeks (around 7/7/2025).    History of Present Illness  Chief Complaint   Patient presents with   • Follow-up     Initially presented May 17, 2025 to ED with history of progressive dysphagia x 2-week with CT chest abdomen pelvis showing mild wall thickening and hyperenhancement of the distal thoracic esophagus. EGD from May 19, 2025 showed single malignant mass in the lower one third of the esophagus with biopsy showed moderately to poorly differentiated  differentiated adenocarcinoma with squamous and signet ring cell features.  Immunohistochemistry, MMR proficient, MSI low, HER2 IHC 2+, FISH negative. Endoscopic ultrasound from 5/22/2025 showed hypoechoic T2 N1 mass visualized covering the entire circumference of the lower third of the esophagus observed, extending to the muscularis propria with small 9 mm lymph node in the paraesophageal region.    The patient had port placement on 6/16/2025.    The patient has hypercoagulability of malignancy. On Nanci 3, 2025 he was found to have acute deep vein thrombosis (DVT) of the left femoral vein. He initiated systemic anticoagulation with rivaroxaban (Xarelto) 20 mg per mouth daily.     Interval History:    Discussing with endocrine to help with diabetes  Tolerating PO intake  No complaints of acute illness  No complaints  of dehydration     Oncology History  Cancer Staging   Malignant neoplasm of lower third of esophagus  (HCC)  Staging form: Esophagus - Adenocarcinoma, AJCC 8th Edition  - Clinical stage from 6/8/2025: Stage Unknown (cTX, cN0, cM0, G3) - Signed by Daryl Novak MD on 6/8/2025  Stage prefix: Initial diagnosis  Total positive nodes: 0  Histologic grading system: 3 grade system  Lymph-vascular invasion (LVI): Presence of LVI unknown/indeterminate  Diagnostic confirmation: Positive histology  Specimen type: Biopsy / Limited Resection  Staged by: Managing physician  Clinical staging modalities: Biopsy, CT  Perineural invasion (PNI): Unknown  Stage used in treatment planning: Yes  National guidelines used in treatment planning: Yes  Oncology History   Malignant neoplasm of lower third of esophagus (HCC)   5/19/2025 Biopsy    B. Esophagogastric junction, mass, biopsy:  -Carcinoma, moderately to poorly differentiated, with glandular, signet ring cell and squamous features.       5/30/2025 Initial Diagnosis    Esophageal carcinoma (HCC)     6/8/2025 -  Cancer Staged    Staging form: Esophagus - Adenocarcinoma, AJCC 8th Edition  - Clinical stage from 6/8/2025: Stage Unknown (cTX, cN0, cM0, G3) - Signed by Daryl Novak MD on 6/8/2025  Stage prefix: Initial diagnosis  Total positive nodes: 0  Histologic grading system: 3 grade system  Lymph-vascular invasion (LVI): Presence of LVI unknown/indeterminate  Diagnostic confirmation: Positive histology  Specimen type: Biopsy / Limited Resection  Staged by: Managing physician  Clinical staging modalities: Biopsy, CT  Perineural invasion (PNI): Unknown  Stage used in treatment planning: Yes  National guidelines used in treatment planning: Yes       6/25/2025 -  Chemotherapy    durvalumab (IMFINZI) IVPB, 1,500 mg (100 % of original dose 1,500 mg), 0 of 1 cycle  Dose modification: 1,500 mg (original dose 1,500 mg, Cycle 1)  DOCEtaxel (TAXOTERE) chemo infusion, 50 mg/m2 = 123.6 mg, 0 of 4 cycles  leucovorin calcium IVPB, 200 mg/m2 = 494 mg, 0 of 4 cycles  oxaliplatin (ELOXATIN) chemo  infusion, 85 mg/m2 = 209.95 mg, 0 of 4 cycles  fluorouracil (ADRUCIL) ambulatory infusion Soln, 2,600 mg/m2/day = 6,420 mg, 0 of 4 cycles        Pertinent Medical History  06/23/25:   Type 2 diabetes mellitus  History of incarcerated ventral hernia requiring repair x 2  Cervical radiculopathy  CKD stage IIIb   HTN       Review of Systems   All other systems reviewed and are negative.        Objective  BP 90/60 (BP Location: Left arm, Patient Position: Sitting, Cuff Size: Adult)   Pulse 78   Temp 97.9 °F (36.6 °C) (Temporal)   Resp 18   Ht 6' (1.829 m)   Wt 127 kg (280 lb)   SpO2 91%   BMI 37.97 kg/m²     Pain Screening:  Pain Score: 0-No pain  ECOG   0  Physical Exam  Constitutional:       General: He is not in acute distress.     Appearance: Normal appearance. He is obese.   HENT:      Head: Normocephalic and atraumatic.      Nose: Nose normal.      Mouth/Throat:      Mouth: Mucous membranes are moist.     Eyes:      Extraocular Movements: Extraocular movements intact.      Pupils: Pupils are equal, round, and reactive to light.       Cardiovascular:      Rate and Rhythm: Normal rate and regular rhythm.   Pulmonary:      Effort: Pulmonary effort is normal.      Breath sounds: No wheezing or rales.   Abdominal:      Palpations: Abdomen is soft. There is no mass.      Tenderness: There is no abdominal tenderness.     Musculoskeletal:         General: No tenderness.      Right lower leg: Edema present.      Left lower leg: Edema present.      Comments: Limited ROM     Neurological:      General: No focal deficit present.     Psychiatric:         Mood and Affect: Mood normal.       Labs: I have reviewed the following labs:  Lab Results   Component Value Date/Time    WBC 4.55 06/21/2025 11:34 AM    RBC 4.23 06/21/2025 11:34 AM    Hemoglobin 12.6 06/21/2025 11:34 AM    Hematocrit 38.0 06/21/2025 11:34 AM    MCV 90 06/21/2025 11:34 AM    MCH 29.8 06/21/2025 11:34 AM    RDW 12.1 06/21/2025 11:34 AM    Platelets 219  06/21/2025 11:34 AM    Segmented % 64 06/21/2025 11:34 AM    Lymphocytes % 21 06/21/2025 11:34 AM    Monocytes % 11 06/21/2025 11:34 AM    Eosinophils Relative 4 06/21/2025 11:34 AM    Basophils Relative 0 06/21/2025 11:34 AM    Immature Grans % 0 06/21/2025 11:34 AM    Absolute Neutrophils 2.90 06/21/2025 11:34 AM     Lab Results   Component Value Date/Time    Potassium 5.0 06/21/2025 11:34 AM    Chloride 96 06/21/2025 11:34 AM    CO2 29 06/21/2025 11:34 AM    CO2, i-STAT 27 10/03/2024 06:45 PM    BUN 35 (H) 06/21/2025 11:34 AM    Creatinine 1.84 (H) 06/21/2025 11:34 AM    Glucose, i-STAT 112 10/03/2024 06:45 PM    Glucose, Fasting 171 (H) 04/12/2025 05:41 AM    Calcium 9.6 06/21/2025 11:34 AM    AST 13 06/21/2025 11:34 AM    ALT 11 06/21/2025 11:34 AM    Alkaline Phosphatase 55 06/21/2025 11:34 AM    Total Protein 7.3 06/21/2025 11:34 AM    Albumin 4.2 06/21/2025 11:34 AM    Total Bilirubin 0.41 06/21/2025 11:34 AM    eGFR 38 06/21/2025 11:34 AM     CT chest abd and pelvis 5/17/2025:  IMPRESSION:  Mild wall thickening and hyperenhancement of the distal thoracic esophagus compatible with esophagitis. Ingested material retained within the mid thoracic esophagus. Gastroenterology consult recommended    Pathology: 5/19/2025  Final Diagnosis   A. Stomach, biopsy:  -Gastric antral mucosa with reactive/chemical gastropathy.  -Gastric oxyntic mucosa with no significant histopathologic change.   -Negative for Helicobacter pylori organisms on H&E stain.      B. Esophagogastric junction, mass, biopsy:  -Carcinoma, moderately to poorly differentiated, with glandular, signet ring cell and squamous features.  -MMR has been ordered, results to follow in an addendum.  -HER2 IHC has been ordered, results to follow in an addendum.      RESULTS OF IMMUNOHISTOCHEMICAL ANALYSIS FOR MISMATCH REPAIR PROTEIN LOSS - Block B1     INTERPRETATION: No loss of nuclear expression of MMR proteins: low probability of MSI-H     RESULTS:  Antibody             Clone                 Description                                  Results  MLH1                 G168                  Mismatch repair protein  Intact nuclear expression  MSH2                [id number]        Mismatch repair protein  Intact nuclear expression  MSH6                SP93                  Mismatch repair protein  Intact nuclear expression  PMS2                 EP51                  Mismatch repair protein  Intact nuclear expression     Note: A positive control for each antibody have been reviewed and accepted.      Addendum issued to report HER2 IHC results (see report in the media tab for further details).  HER2 IHC Score: 2+  HER2 FISH reflex testing is pending, results to follow in addendum.    Addendum issued to report HER2 FISH results (see report in the media tab for further details).  RESULTS: NEGATIVE  Interpretation:  Average HER2 signals/nucleus: 3.8  Average TRACEE 17 signals/nucleus: 2.6  HER2/CEN17 signal ratio: 1.5  Number of Observers: 1    Carroll Mercer DO  PGY-4 Hematology/Oncology Fellow   Attestation signed by Daryl Novak MD at 6/23/2025  4:39 PM:  HEMATOLOGY ONCOLOGY STAFF PHYSICIAN ATTESTATION   I have personally interviewed and examined [facility] with  Dr. Carroll Mercer. I have reviewed and discussed the HPI, assessment, and oncologic management plan formulated by Dr. Mercer.  I concur with his assessment and management plan.    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  The patient presented to the emergency department in mid May 2025 with progressive dysphagia to solids, upper abdominal pain, nausea and  vomiting.  Initial pathologic diagnosis of invasive moderately to poorly differentiated adenocarcinoma with signet ring and squamous cell features of the distal esophagus was established on May 19, 2025.  The patient is referred to medical oncology clinic for consideration of multimodality treatment of distal esophageal cancer.     The patient's primary malignancy of the distal esophagus is predominantly a poorly differentiated adenocarcinoma with squamous and signet ring cell features.  Therefore, I recommend nikki-operative chemotherapy with infusional 5-fluorouracil, docetaxel, and oxaliplatin (FLOT) plus minus immunotherapy with the anti-PD-L1 agent durvalumab (Imfinzi).  If durvalumab is not approved by the patient's insurance, he will receive 4 cycles of neoadjuvant FLOT followed by surgical resection of the primary poorly differentiated adenocarcinoma of the distal esophagus and regional lymph node dissection, followed by 4 cycles of adjuvant FLOT, followed by surveillance.  I discussed with the patient clinical benefits and risks of perioperative FLOT.  Clinical benefits outweigh any potential treatment related adverse events or toxicity. Before initiation of perioperative chemotherapy, Mr. Castillo needs to complete clinical staging for his locally advanced invasive, poorly differentiated adenocarcinoma of the distal esophagus.      Interim Assessment: On June 17, 2025, EGD plus EUS showed hypoechoic T2N1 mass covering the entire circumference in the lower third of the esophagus, observed at 39 cm from incisors, extending to the muscularis propria with an indeterminate layer origin. A small 9 mm lymph node in the paraesophageal region was identified. Today, FDG PET CT scan could not be performed because of uncontrolled type 2 diabetes mellitus. Mr. Castillo is ready to initiate neoadjuvant chemo-immunotherapy.     Plan:  Initiate neoadjuvant FLOT plus durvalumab within the next 2 weeks.  In the neoadjuvant portion  of treatment, the patient will receive 2 cycles of FLOT with durvalumab on cycle 1.  Subsequently, he will undergo surgical resection of the primary adenocarcinoma of the esophagus with regional lymph node dissection.  After recovering from surgery, he will receive 2 cycles of adjuvant FLOT with durvalumab on cycle #1 of adjuvant therapy.  After 2 cycles of adjuvant chemo-immunotherapy, he will receive a year-long maintenance immunotherapy with single agent durvalumab 1500 mg intravenously every 4 weeks (total of 10 cycles of maintenance immunotherapy).  FDG PET CT scan if possible  Periodic monitoring of serum CEA  Return to medical oncology clinic for history and physical exam in 2 weeks for history and physical exam and for assessment of safety and tolerability of FLOT plus durvalumab    Acute deep vein thrombosis (DVT) of femoral vein of left lower extremity (HCC)  The patient has hypercoagulability of malignancy.  On Nanci 3, 2025 he was found to have acute deep vein thrombosis (DVT) of the left femoral vein.  He initiated systemic anticoagulation with rivaroxaban (Xarelto) 20 mg per mouth daily.  I recommend to continue systemic anticoagulation with rivaroxaban 20 mg per mouth daily.  Duration of systemic anticoagulation is a minimum of 6 months.  He may hold systemic anticoagulation 3 days before placement of chemotherapy port.        Mr. Castillo understands and agrees with our management recommendations and plan of care. We answered questions to his satisfaction.     Daryl Novak MD

## 2025-06-24 ENCOUNTER — OFFICE VISIT (OUTPATIENT)
Dept: ENDOCRINOLOGY | Facility: CLINIC | Age: 60
End: 2025-06-24
Payer: MEDICARE

## 2025-06-24 VITALS
HEIGHT: 72 IN | DIASTOLIC BLOOD PRESSURE: 78 MMHG | BODY MASS INDEX: 37.65 KG/M2 | OXYGEN SATURATION: 98 % | SYSTOLIC BLOOD PRESSURE: 120 MMHG | HEART RATE: 75 BPM | WEIGHT: 278 LBS

## 2025-06-24 DIAGNOSIS — E11.22 TYPE 2 DIABETES MELLITUS WITH CHRONIC KIDNEY DISEASE, WITHOUT LONG-TERM CURRENT USE OF INSULIN, UNSPECIFIED CKD STAGE (HCC): Primary | ICD-10-CM

## 2025-06-24 DIAGNOSIS — E11.65 TYPE 2 DIABETES MELLITUS WITH HYPERGLYCEMIA, WITHOUT LONG-TERM CURRENT USE OF INSULIN (HCC): ICD-10-CM

## 2025-06-24 DIAGNOSIS — C15.5 MALIGNANT NEOPLASM OF LOWER THIRD OF ESOPHAGUS (HCC): ICD-10-CM

## 2025-06-24 PROCEDURE — 99215 OFFICE O/P EST HI 40 MIN: CPT | Performed by: INTERNAL MEDICINE

## 2025-06-24 PROCEDURE — G2211 COMPLEX E/M VISIT ADD ON: HCPCS | Performed by: INTERNAL MEDICINE

## 2025-06-24 RX ORDER — INSULIN GLARGINE 100 [IU]/ML
INJECTION, SOLUTION SUBCUTANEOUS
Qty: 15 ML | Refills: 1 | Status: SHIPPED | OUTPATIENT
Start: 2025-06-24

## 2025-06-24 RX ORDER — PEN NEEDLE, DIABETIC 32GX 5/32"
NEEDLE, DISPOSABLE MISCELLANEOUS
Qty: 100 EACH | Refills: 3 | Status: SHIPPED | OUTPATIENT
Start: 2025-06-24

## 2025-06-24 RX ORDER — INSULIN ASPART 100 [IU]/ML
INJECTION, SOLUTION INTRAVENOUS; SUBCUTANEOUS
Qty: 15 ML | Refills: 1 | Status: SHIPPED | OUTPATIENT
Start: 2025-06-24

## 2025-06-24 NOTE — ASSESSMENT & PLAN NOTE
Lab Results   Component Value Date    HGBA1C 10.4 (H) 04/06/2025     Renal function being monitored by PCP

## 2025-06-24 NOTE — PROGRESS NOTES
Name: Yoni Castillo      : 1965      MRN: [id number]  Encounter Provider: Marissa Meyer MD  Encounter Date: 2025   Encounter department: [facility] [place]    Chief Complaint   Patient presents with   • Diabetes Type 2   :  Assessment & Plan  Type 2 diabetes mellitus with chronic kidney disease, without long-term current use of insulin, unspecified CKD stage ([facility])    Lab Results   Component Value Date    HGBA1C 10.4 (H) 2025     Renal function being monitored by PCP       Type 2 diabetes mellitus with hyperglycemia, without long-term current use of insulin (HCC)    Lab Results   Component Value Date    HGBA1C 10.4 (H) 2025   Discussed with the patient that given the degree of hyperglycemia and him starting steroids with chemotherapy insulin therapy is the optimal choice-he was initially reluctant to start insulin therapy however he is agreeable now.  Start Lantus 20 units daily at bedtime-NovoLog 8 units before meals and use a correction scale of 1 unit for every 30 Mg per DL above a target of 140.  Monitor blood sugar 4 times a day and let me know in the next few days how sugars are coming along  Orders:  •  Blood Glucose Monitoring Suppl (ONE TOUCH ULTRA MINI) w/Device KIT; Once  •  Glucometer test strips  •  Insulin Glargine Solostar (Lantus SoloStar) 100 UNIT/ML SOPN; 20 units at bedtime  •  insulin aspart (NovoLOG FlexPen) 100 UNIT/ML injection pen; 8 units before meals Correction scale 1 unit for 30 mg/dl above 140  •  Insulin Pen Needle (BD Pen Needle [place] U/F) 32G X 4 MM MISC; 4/day    Malignant neoplasm of lower third of esophagus (HCC)  Follow-up with oncology-starting chemotherapy tomorrow               History of Present Illness   60 -year-old male with type 2 diabetes, hypertension, dyslipidemia, CKD seen in f/u   Diagnosed with esophageal adenocarcinoma last month after he presented to the hospital with progressive dysphagia to  solids -is starting chemotherapy tomorrow-will receive dexamethasone 4 mg twice daily the day before the day off and the day after chemotherapy    Current diabetic regimen:   Jardiance     Lost 16 lbs past month      Has been checking fingersticks at home however thinks that meter is too old.  Sugars are usually above 200s      Review of Systems as per HPI    Medications Ordered Prior to Encounter[1]   Social History[2]     Medical History Reviewed by provider this encounter:     .    Objective   /78   Pulse 75   Ht 6' (1.829 m)   Wt 126 kg (278 lb)   SpO2 98%   BMI 37.70 kg/m²      Body mass index is 37.7 kg/m².  Wt Readings from Last 3 Encounters:   06/24/25 126 kg (278 lb)   06/23/25 127 kg (280 lb)   06/17/25 127 kg (280 lb)     Physical Exam  Vitals reviewed.   Constitutional:       General: He is not in acute distress.     Appearance: Normal appearance. He is obese. He is not ill-appearing, toxic-appearing or diaphoretic.   HENT:      Head: Normocephalic and atraumatic.     Eyes:      General: No scleral icterus.     Extraocular Movements: Extraocular movements intact.       Cardiovascular:      Rate and Rhythm: Normal rate and regular rhythm.      Heart sounds: Normal heart sounds. No murmur heard.  Pulmonary:      Effort: Pulmonary effort is normal. No respiratory distress.      Breath sounds: Normal breath sounds. No wheezing or rales.     Musculoskeletal:      Cervical back: Neck supple.      Right lower leg: No edema.      Left lower leg: No edema.   Lymphadenopathy:      Cervical: No cervical adenopathy.     Skin:     General: Skin is warm and dry.     Neurological:      General: No focal deficit present.      Mental Status: He is alert and oriented to person, place, and time.     Psychiatric:         Mood and Affect: Mood normal.         Behavior: Behavior normal.         Thought Content: Thought content normal.         Judgment: Judgment normal.           Last Eye Exam: 11/15/2023  Last Foot  Exam: 02/21/2025  Health Maintenance   Topic Date Due   • Diabetic Eye Exam  11/15/2024   • Diabetic Foot Exam  02/21/2026         Labs: I have reviewed pertinent labs including:   Lab Results   Component Value Date    HGBA1C 10.4 (H) 04/06/2025    HGBA1C 8.8 (H) 02/21/2025    HGBA1C 6.4 10/30/2024      Lab Results   Component Value Date    CREATININE 1.84 (H) 06/21/2025    CREATININE 1.65 (H) 06/03/2025    CREATININE 1.42 (H) 05/20/2025    BUN 35 (H) 06/21/2025    K 5.0 06/21/2025    CL 96 06/21/2025    CO2 29 06/21/2025      HDL, Direct   Date Value Ref Range Status   02/21/2025 48 >=40 mg/dL Final     Triglycerides   Date Value Ref Range Status   02/21/2025 178 (H) See Comment mg/dL Final     Comment:     Triglyceride:     0-9Y            <75mg/dL     10Y-17Y         <90 mg/dL       >=18Y     Normal          <150 mg/dL     Borderline High 150-199 mg/dL     High            200-499 mg/dL        Very High       >499 mg/dL    Specimen collection should occur prior to Metamizole administration due to the potential for falsely depressed results.      ALT   Date Value Ref Range Status   06/21/2025 11 7 - 52 U/L Final     Comment:     Specimen collection should occur prior to Sulfasalazine administration due to the potential for falsely depressed results.      AST   Date Value Ref Range Status   06/21/2025 13 13 - 39 U/L Final     Alkaline Phosphatase   Date Value Ref Range Status   06/21/2025 55 34 - 104 U/L Final      Lab Results   Component Value Date    [id number] 0.458 07/02/2023      Lab Results   Component Value Date    FREET4 0.85 06/16/2021        There are no Patient Instructions on file for this visit.    Discussed with the patient and all questioned fully answered. He will call me if any problems arise.    Administrative Statements   I have spent a total time of 40  minutes in caring for this patient on the day of the visit/encounter including Diagnostic results, Prognosis, Risks and benefits of tx options,  Instructions for management, Patient and family education, Importance of tx compliance, Risk factor reductions, Impressions, Counseling / Coordination of care, Documenting in the medical record, Reviewing/placing orders in the medical record (including tests, medications, and/or procedures), and Obtaining or reviewing history  .         [1]  Current Outpatient Medications on File Prior to Visit   Medication Sig Dispense Refill   • acetaminophen (TYLENOL) 500 mg tablet Take 2 tablets (1,000 mg total) by mouth every 8 (eight) hours 60 tablet 0   • amitriptyline (ELAVIL) 10 mg tablet 30mg at bedtime 90 tablet 5   • ammonium lactate (LAC-HYDRIN) 12 % lotion as needed in the morning and as needed in the evening.     • carvedilol (COREG) 25 mg tablet Take 1 tablet (25 mg total) by mouth 2 (two) times a day with meals 200 tablet 1   • dexamethasone (DECADRON) 4 mg tablet Take 2 tablets (8 mg total) by mouth in the morning and 2 tablets (8 mg total) in the evening. Take with meals. Take 2 tabs by mouth twice daily the day before, the day of, and the day after chemo. 12 tablet 3   • Empagliflozin (Jardiance) 25 MG TABS Take 1 tablet (25 mg total) by mouth daily 90 tablet 1   • [START ON 6/25/2025] fluorouracil 6,420 mg in CADD/Elastomeric Infusion Device Infuse 6,420 mg (2,600 mg/m2/day x 2.47 m2) into a catheter in a vein via infusion device over 24 hours for 1 day  Infusion planned for June 25, 2025. 1 each 0   • hydroCHLOROthiazide 12.5 mg tablet Take 1 tablet (12.5 mg total) by mouth 2 (two) times a day 100 tablet 3   • ketoconazole (NIZORAL) 2 % cream if needed     • lidocaine (Lidoderm) 5 % Apply 1 patch topically over 12 hours daily Remove & Discard patch within 12 hours or as directed by MD 30 patch 0   • lidocaine (LMX) 4 % cream Apply topically as needed for moderate pain 30 g 0   • lidocaine-prilocaine (EMLA) cream Apply topically as needed for mild pain 100 g 1   • lisinopril-hydrochlorothiazide  (PRINZIDE,ZESTORETIC) 20-12.5 MG per tablet Take 1 tablet by mouth in the morning and 1 tablet before bedtime. 200 tablet 1   • methocarbamol (ROBAXIN) 750 mg tablet Take 2 tablets (1,500 mg total) by mouth every 8 (eight) hours (Patient taking differently: Take 1,500 mg by mouth every 6 (six) hours as needed for muscle spasms) 60 tablet 2   • ondansetron (ZOFRAN-ODT) 8 mg disintegrating tablet Take 1 tablet (8 mg total) by mouth every 8 (eight) hours as needed for nausea or vomiting 20 tablet 0   • pantoprazole (PROTONIX) 40 mg tablet Take 1 tablet (40 mg total) by mouth 2 (two) times a day before meals 60 tablet 3   • rivaroxaban (Xarelto Starter Pack) 15 & 20 MG starter pack Take 15 mg by mouth twice daily for 21 days, then 20 mg once daily thereafter. 51 each 0   • traMADol (Ultram) 50 mg tablet Take 1 tablet (50 mg total) by mouth every 8 (eight) hours as needed for moderate pain and 2 pills at bedtime at least 6 hours after last dose of the day 100 tablet 0   • Ubrogepant (UBRELVY) 100 MG tablet Take 1 tablet (100 mg) one time as needed for migraine. May repeat one additional tablet (100 mg) at least two hours after the first dose. Do not use more than two doses per day, or for more than twelve days per month. 12 tablet 3     No current facility-administered medications on file prior to visit.   [2]  Social History  Tobacco Use   • Smoking status: Never   • Smokeless tobacco: Never   Vaping Use   • Vaping status: Never Used   Substance and Sexual Activity   • Alcohol use: Yes     Alcohol/week: 0.0 - 1.0 standard drinks of alcohol     Comment: ocassional   • Drug use: Never   • Sexual activity: Not Currently     Partners: Female     Birth control/protection: Abstinence

## 2025-06-24 NOTE — ASSESSMENT & PLAN NOTE
Lab Results   Component Value Date    HGBA1C 10.4 (H) 04/06/2025   Discussed with the patient that given the degree of hyperglycemia and him starting steroids with chemotherapy insulin therapy is the optimal choice-he was initially reluctant to start insulin therapy however he is agreeable now.  Start Lantus 20 units daily at bedtime-NovoLog 8 units before meals and use a correction scale of 1 unit for every 30 Mg per DL above a target of 140.  Monitor blood sugar 4 times a day and let me know in the next few days how sugars are coming along  Orders:  •  Blood Glucose Monitoring Suppl (ONE TOUCH ULTRA MINI) w/Device KIT; Once  •  Glucometer test strips  •  Insulin Glargine Solostar (Lantus SoloStar) 100 UNIT/ML SOPN; 20 units at bedtime  •  insulin aspart (NovoLOG FlexPen) 100 UNIT/ML injection pen; 8 units before meals Correction scale 1 unit for 30 mg/dl above 140  •  Insulin Pen Needle (BD Pen Needle [place] U/F) 32G X 4 MM MISC; 4/day

## 2025-06-25 ENCOUNTER — TELEPHONE (OUTPATIENT)
Dept: NUTRITION | Facility: CLINIC | Age: 60
End: 2025-06-25

## 2025-06-25 ENCOUNTER — HOSPITAL ENCOUNTER (OUTPATIENT)
Dept: INFUSION CENTER | Facility: HOSPITAL | Age: 60
Discharge: HOME | End: 2025-06-25
Attending: INTERNAL MEDICINE
Payer: MEDICARE

## 2025-06-25 VITALS
HEIGHT: 72 IN | RESPIRATION RATE: 20 BRPM | BODY MASS INDEX: 37.98 KG/M2 | SYSTOLIC BLOOD PRESSURE: 145 MMHG | DIASTOLIC BLOOD PRESSURE: 79 MMHG | WEIGHT: 280.43 LBS | TEMPERATURE: 98 F | HEART RATE: 76 BPM

## 2025-06-25 DIAGNOSIS — E11.65 TYPE 2 DIABETES MELLITUS WITH HYPERGLYCEMIA, WITHOUT LONG-TERM CURRENT USE OF INSULIN (HCC): ICD-10-CM

## 2025-06-25 DIAGNOSIS — C15.5 MALIGNANT NEOPLASM OF LOWER THIRD OF ESOPHAGUS (HCC): Primary | ICD-10-CM

## 2025-06-25 PROCEDURE — 96367 TX/PROPH/DG ADDL SEQ IV INF: CPT

## 2025-06-25 PROCEDURE — 96417 CHEMO IV INFUS EACH ADDL SEQ: CPT

## 2025-06-25 PROCEDURE — 96413 CHEMO IV INFUSION 1 HR: CPT

## 2025-06-25 PROCEDURE — G0498 CHEMO EXTEND IV INFUS W/PUMP: HCPCS

## 2025-06-25 PROCEDURE — 96368 THER/DIAG CONCURRENT INF: CPT

## 2025-06-25 PROCEDURE — 96415 CHEMO IV INFUSION ADDL HR: CPT

## 2025-06-25 RX ORDER — SODIUM CHLORIDE 9 MG/ML
20 INJECTION, SOLUTION INTRAVENOUS ONCE
Status: COMPLETED | OUTPATIENT
Start: 2025-06-25 | End: 2025-06-25

## 2025-06-25 RX ORDER — DEXTROSE MONOHYDRATE 50 MG/ML
20 INJECTION, SOLUTION INTRAVENOUS ONCE
Status: COMPLETED | OUTPATIENT
Start: 2025-06-25 | End: 2025-06-25

## 2025-06-25 RX ADMIN — SODIUM CHLORIDE 1500 MG: 9 INJECTION, SOLUTION INTRAVENOUS at 11:38

## 2025-06-25 RX ADMIN — DEXTROSE 20 ML/HR: 5 SOLUTION INTRAVENOUS at 14:09

## 2025-06-25 RX ADMIN — DOCETAXEL 120 MG: 20 INJECTION, SOLUTION INTRAVENOUS at 12:59

## 2025-06-25 RX ADMIN — LEUCOVORIN CALCIUM 494 MG: 500 INJECTION, POWDER, LYOPHILIZED, FOR SOLUTION INTRAMUSCULAR; INTRAVENOUS at 14:11

## 2025-06-25 RX ADMIN — SODIUM CHLORIDE 20 ML/HR: 0.9 INJECTION, SOLUTION INTRAVENOUS at 11:06

## 2025-06-25 RX ADMIN — ONDANSETRON: 2 INJECTION INTRAMUSCULAR; INTRAVENOUS at 11:06

## 2025-06-25 RX ADMIN — OXALIPLATIN 200 MG: 5 INJECTION, SOLUTION INTRAVENOUS at 14:11

## 2025-06-25 NOTE — TELEPHONE ENCOUNTER
Received another call from Yoni stating he didn't realize he had another appt at 10:30 on Monday 6/30. Rescheduled our appt for 7/1/25 at 11 am.

## 2025-06-25 NOTE — PROGRESS NOTES
Pt received chemo infusion w/out incident. Offers no complaints. 5FU pump connected @ 1640 & returning tomorrow @ 1640 for d/c

## 2025-06-25 NOTE — TELEPHONE ENCOUNTER
Received call from Yoni stating he will have to cancel today's RD appt as he will still be at his infusion during that time. Rescheduled our appt for 6/30/25 at 10 am.

## 2025-06-25 NOTE — TELEPHONE ENCOUNTER
Please review Transmission to pharmacy error occurred. Medication resent to pharmacy, but keeps failing.

## 2025-06-26 ENCOUNTER — HOSPITAL ENCOUNTER (OUTPATIENT)
Dept: INFUSION CENTER | Facility: HOSPITAL | Age: 60
Discharge: HOME | End: 2025-06-26
Attending: INTERNAL MEDICINE

## 2025-06-26 DIAGNOSIS — C15.5 MALIGNANT NEOPLASM OF LOWER THIRD OF ESOPHAGUS (HCC): Primary | ICD-10-CM

## 2025-06-26 NOTE — PROGRESS NOTES
5FU pump d/c @ 1640, res vol=0. Port flushed per protocol, good blood return noted. Confirmed next appt, declined AVS

## 2025-06-30 ENCOUNTER — TELEPHONE (OUTPATIENT)
Dept: GASTROENTEROLOGY | Facility: CLINIC | Age: 60
End: 2025-06-30

## 2025-06-30 ENCOUNTER — OFFICE VISIT (OUTPATIENT)
Dept: GASTROENTEROLOGY | Facility: CLINIC | Age: 60
End: 2025-06-30
Payer: MEDICARE

## 2025-06-30 VITALS
HEIGHT: 72 IN | BODY MASS INDEX: 35.35 KG/M2 | TEMPERATURE: 97.6 F | DIASTOLIC BLOOD PRESSURE: 74 MMHG | WEIGHT: 261 LBS | SYSTOLIC BLOOD PRESSURE: 122 MMHG

## 2025-06-30 DIAGNOSIS — C15.5 MALIGNANT NEOPLASM OF LOWER THIRD OF ESOPHAGUS (HCC): Primary | ICD-10-CM

## 2025-06-30 DIAGNOSIS — R11.0 NAUSEA: ICD-10-CM

## 2025-06-30 PROCEDURE — 99214 OFFICE O/P EST MOD 30 MIN: CPT

## 2025-06-30 NOTE — ASSESSMENT & PLAN NOTE
Patient was admitted for dysphagia 5/2025 and had EGD that showed malignant appearing mass in the lower third esophagus.  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  Underwent EUS 6/17 that showed hypoechoic T2N1 mass covering the entire circumference in the lower third of the esophagus, observed at 39 cm from incisors, extending to the muscularis propria with an indeterminate layer origin. A small 9 mm lymph node in the paraesophageal region was identified.   Patient following with oncology with plan for chemo plus immunotherapy followed by surgical resection.  Continue to follow with oncology.

## 2025-06-30 NOTE — TELEPHONE ENCOUNTER
I called pt per Genia, pt had EUS and is folling Oncology     Genia wants to know if pt is stable and not having any symptoms he can follow up with oncology and cancel today's appt

## 2025-06-30 NOTE — PROGRESS NOTES
Name: Yoni Castillo      : 1965      MRN: [id number]  Encounter Provider: Marta Oropeza PA-C  Encounter Date: 2025   Encounter department: [facility] [place]  :  Assessment & Plan  Malignant neoplasm of lower third of esophagus (HCC)  Patient was admitted for dysphagia 2025 and had EGD that showed malignant appearing mass in the lower third esophagus.  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  Underwent EUS  that showed hypoechoic T2N1 mass covering the entire circumference in the lower third of the esophagus, observed at 39 cm from incisors, extending to the muscularis propria with an indeterminate layer origin. A small 9 mm lymph node in the paraesophageal region was identified.   Patient following with oncology with plan for chemo plus immunotherapy followed by surgical resection.  Continue to follow with oncology.       Nausea  Patient started chemotherapy  and unfortunately has been having nausea, decreased appetite, sour taste since then.  Discussed continuing with adequate hydration, bland diet, small portions.  Continue Zofran as needed, PPI twice daily.           History of Present Illness   Yoni Castillo is a 60 y.o. male with PMH of HTN, JEWEL, esophageal cancer, type 2 diabetes, CKD 3, DVT on xarelto presenting for procedure follow-up.  Patient stated he has been doing well.  He started chemotherapy  and has been having nausea, decreased appetite, food has been tasting bad since then.  He was having diarrhea over the weekend which has resolved.  Has ongoing feeling of food getting stuck in chest area.  Denies fever/chills, hematemesis, melena, hematochezia.    Recent labs show Hgb 12.6, ptl 219, sodium 133, AST 13, ALT 11, alk phos 55, tbili .041    Prior imaging/procedures:  EUS 2025: Hypoechoic T2N1 mass was visualized, covering the entire circumference  in the lower third of the esophagus, extending to the muscularis propria with an indeterminate layer origin. Small lymph node in the paraesophageal lesion. Normal stomach and duodenum  EGD 5/19/2025: Single malignant-appearing mass in the lower third of the esophagus; performed cold forceps biopsy with partial removal. C3M4 Butler's esophagus with associated lesion. Biopsy of mass showed carcinoma, moderately to poorly differentiated, with glandular, signet ring cell and squamous features. Stomach bx were unremarkable.    Review of Systems   Constitutional:  Negative for appetite change, chills and fever.   Respiratory:  Negative for shortness of breath.    Gastrointestinal:  Positive for abdominal pain and nausea. Negative for blood in stool, constipation, diarrhea and vomiting.     Medical History Reviewed by provider this encounter:     .  Medications Ordered Prior to Encounter[1]   Social History[2]     Objective   /74 (BP Location: Left arm, Patient Position: Sitting, Cuff Size: Adult)   Temp 97.6 °F (36.4 °C) (Tympanic)   Ht 6' (1.829 m)   Wt 118 kg (261 lb)   BMI 35.40 kg/m²      Physical Exam  Vitals reviewed.   Constitutional:       General: He is not in acute distress.     Appearance: He is not ill-appearing.   HENT:      Head: Normocephalic and atraumatic.     Cardiovascular:      Rate and Rhythm: Normal rate and regular rhythm.   Pulmonary:      Effort: Pulmonary effort is normal.      Breath sounds: Normal breath sounds.   Abdominal:      General: Abdomen is flat. Bowel sounds are normal. There is no distension.      Palpations: Abdomen is soft.      Tenderness: There is no abdominal tenderness.     Skin:     General: Skin is warm and dry.     Neurological:      Mental Status: He is alert. Mental status is at baseline.                  [1]   Current Outpatient Medications on File Prior to Visit   Medication Sig Dispense Refill    acetaminophen (TYLENOL) 500 mg tablet Take 2 tablets (1,000 mg  total) by mouth every 8 (eight) hours 60 tablet 0    amitriptyline (ELAVIL) 10 mg tablet 30mg at bedtime 90 tablet 5    ammonium lactate (LAC-HYDRIN) 12 % lotion as needed in the morning and as needed in the evening.      Blood Glucose Monitoring Suppl (ONE TOUCH ULTRA MINI) w/Device KIT Once 1 kit 0    carvedilol (COREG) 25 mg tablet Take 1 tablet (25 mg total) by mouth 2 (two) times a day with meals 200 tablet 1    dexamethasone (DECADRON) 4 mg tablet Take 2 tablets (8 mg total) by mouth in the morning and 2 tablets (8 mg total) in the evening. Take with meals. Take 2 tabs by mouth twice daily the day before, the day of, and the day after chemo. 12 tablet 3    Empagliflozin (Jardiance) 25 MG TABS Take 1 tablet (25 mg total) by mouth daily 90 tablet 1    hydroCHLOROthiazide 12.5 mg tablet Take 1 tablet (12.5 mg total) by mouth 2 (two) times a day 100 tablet 3    insulin aspart (NovoLOG FlexPen) 100 UNIT/ML injection pen 8 units before meals Correction scale 1 unit for 30 mg/dl above 140 15 mL 1    Insulin Glargine Solostar (Lantus SoloStar) 100 UNIT/ML SOPN 20 units at bedtime 15 mL 1    Insulin Pen Needle (BD Pen Needle [place] U/F) 32G X 4 MM MISC 4/day 100 each 3    ketoconazole (NIZORAL) 2 % cream if needed      lidocaine (Lidoderm) 5 % Apply 1 patch topically over 12 hours daily Remove & Discard patch within 12 hours or as directed by MD 30 patch 0    lidocaine (LMX) 4 % cream Apply topically as needed for moderate pain 30 g 0    lidocaine-prilocaine (EMLA) cream Apply topically as needed for mild pain 100 g 1    lisinopril-hydrochlorothiazide (PRINZIDE,ZESTORETIC) 20-12.5 MG per tablet Take 1 tablet by mouth in the morning and 1 tablet before bedtime. 200 tablet 1    methocarbamol (ROBAXIN) 750 mg tablet Take 2 tablets (1,500 mg total) by mouth every 8 (eight) hours (Patient taking differently: Take 1,500 mg by mouth every 6 (six) hours as needed for muscle spasms) 60 tablet 2    ondansetron (ZOFRAN-ODT) 8 mg  disintegrating tablet Take 1 tablet (8 mg total) by mouth every 8 (eight) hours as needed for nausea or vomiting 20 tablet 0    pantoprazole (PROTONIX) 40 mg tablet Take 1 tablet (40 mg total) by mouth 2 (two) times a day before meals 60 tablet 3    traMADol (Ultram) 50 mg tablet Take 1 tablet (50 mg total) by mouth every 8 (eight) hours as needed for moderate pain and 2 pills at bedtime at least 6 hours after last dose of the day 100 tablet 0    Ubrogepant (UBRELVY) 100 MG tablet Take 1 tablet (100 mg) one time as needed for migraine. May repeat one additional tablet (100 mg) at least two hours after the first dose. Do not use more than two doses per day, or for more than twelve days per month. 12 tablet 3    rivaroxaban (Xarelto Starter Pack) 15 & 20 MG starter pack Take 15 mg by mouth twice daily for 21 days, then 20 mg once daily thereafter. (Patient not taking: Reported on 6/30/2025) 51 each 0     Current Facility-Administered Medications on File Prior to Visit   Medication Dose Route Frequency Provider Last Rate Last Admin    [DISCONTINUED] alteplase (CATHFLO) injection 2 mg  2 mg Intracatheter Q1MIN PRN Daryl Novak MD       [2]   Social History  Tobacco Use    Smoking status: Never    Smokeless tobacco: Never   Vaping Use    Vaping status: Never Used   Substance and Sexual Activity    Alcohol use: Yes     Alcohol/week: 0.0 - 1.0 standard drinks of alcohol     Comment: ocassional    Drug use: Never    Sexual activity: Not Currently     Partners: Female     Birth control/protection: Abstinence

## 2025-07-01 ENCOUNTER — TELEPHONE (OUTPATIENT)
Dept: NUTRITION | Facility: CLINIC | Age: 60
End: 2025-07-01

## 2025-07-01 ENCOUNTER — APPOINTMENT (EMERGENCY)
Dept: RADIOLOGY | Facility: HOSPITAL | Age: 60
DRG: 682 | End: 2025-07-01
Payer: MEDICARE

## 2025-07-01 ENCOUNTER — HOSPITAL ENCOUNTER (INPATIENT)
Facility: HOSPITAL | Age: 60
LOS: 8 days | DRG: 682 | End: 2025-07-09
Attending: EMERGENCY MEDICINE | Admitting: FAMILY MEDICINE
Payer: MEDICARE

## 2025-07-01 ENCOUNTER — NURSE TRIAGE (OUTPATIENT)
Age: 60
End: 2025-07-01

## 2025-07-01 ENCOUNTER — APPOINTMENT (EMERGENCY)
Dept: CT IMAGING | Facility: HOSPITAL | Age: 60
DRG: 682 | End: 2025-07-01
Payer: MEDICARE

## 2025-07-01 DIAGNOSIS — A41.9 SEPSIS (HCC): ICD-10-CM

## 2025-07-01 DIAGNOSIS — R33.9 URINARY RETENTION: ICD-10-CM

## 2025-07-01 DIAGNOSIS — E11.22 TYPE 2 DIABETES MELLITUS WITH CHRONIC KIDNEY DISEASE, WITHOUT LONG-TERM CURRENT USE OF INSULIN, UNSPECIFIED CKD STAGE (HCC): ICD-10-CM

## 2025-07-01 DIAGNOSIS — E11.65 TYPE 2 DIABETES MELLITUS WITH HYPERGLYCEMIA, WITHOUT LONG-TERM CURRENT USE OF INSULIN (HCC): Primary | ICD-10-CM

## 2025-07-01 DIAGNOSIS — R42 LIGHTHEADEDNESS: Primary | ICD-10-CM

## 2025-07-01 DIAGNOSIS — I95.9 HYPOTENSION: ICD-10-CM

## 2025-07-01 DIAGNOSIS — N18.31 ACUTE KIDNEY INJURY SUPERIMPOSED ON STAGE 3A CHRONIC KIDNEY DISEASE (HCC): ICD-10-CM

## 2025-07-01 DIAGNOSIS — N17.9 ACUTE KIDNEY INJURY SUPERIMPOSED ON STAGE 3A CHRONIC KIDNEY DISEASE (HCC): ICD-10-CM

## 2025-07-01 DIAGNOSIS — C15.5 MALIGNANT NEOPLASM OF LOWER THIRD OF ESOPHAGUS (HCC): ICD-10-CM

## 2025-07-01 DIAGNOSIS — K92.2 GI BLEED: ICD-10-CM

## 2025-07-01 DIAGNOSIS — N18.31 STAGE 3A CHRONIC KIDNEY DISEASE (HCC): ICD-10-CM

## 2025-07-01 LAB
2HR DELTA HS TROPONIN: -1 NG/L
ALBUMIN SERPL BCG-MCNC: 4.3 G/DL (ref 3.5–5)
ALP SERPL-CCNC: 46 U/L (ref 34–104)
ALT SERPL W P-5'-P-CCNC: 10 U/L (ref 7–52)
ANION GAP SERPL CALCULATED.3IONS-SCNC: 12 MMOL/L (ref 4–13)
ANION GAP SERPL CALCULATED.3IONS-SCNC: 14 MMOL/L (ref 4–13)
AST SERPL W P-5'-P-CCNC: 9 U/L (ref 13–39)
ATRIAL RATE: 84 BPM
B-OH-BUTYR SERPL-MCNC: 1.16 MMOL/L (ref 0.2–0.6)
BACTERIA UR QL AUTO: ABNORMAL /HPF
BASE EX.OXY STD BLDV CALC-SCNC: 51.2 % (ref 60–80)
BASE EXCESS BLDV CALC-SCNC: -4.2 MMOL/L
BASOPHILS # BLD AUTO: 0.02 THOUSANDS/ÂΜL (ref 0–0.1)
BASOPHILS NFR BLD AUTO: 0 % (ref 0–1)
BILIRUB SERPL-MCNC: 0.92 MG/DL (ref 0.2–1)
BILIRUB UR QL STRIP: NEGATIVE
BNP SERPL-MCNC: 32 PG/ML (ref 0–100)
BUN SERPL-MCNC: 90 MG/DL (ref 5–25)
BUN SERPL-MCNC: 92 MG/DL (ref 5–25)
CALCIUM SERPL-MCNC: 8.3 MG/DL (ref 8.4–10.2)
CALCIUM SERPL-MCNC: 9.3 MG/DL (ref 8.4–10.2)
CARDIAC TROPONIN I PNL SERPL HS: 7 NG/L (ref ?–50)
CARDIAC TROPONIN I PNL SERPL HS: 8 NG/L (ref ?–50)
CHLORIDE SERPL-SCNC: 88 MMOL/L (ref 96–108)
CHLORIDE SERPL-SCNC: 93 MMOL/L (ref 96–108)
CLARITY UR: CLEAR
CO2 SERPL-SCNC: 24 MMOL/L (ref 21–32)
CO2 SERPL-SCNC: 25 MMOL/L (ref 21–32)
COLOR UR: ABNORMAL
CREAT SERPL-MCNC: 2.87 MG/DL (ref 0.6–1.3)
CREAT SERPL-MCNC: 2.97 MG/DL (ref 0.6–1.3)
CREAT UR-MCNC: 76.5 MG/DL
EOSINOPHIL # BLD AUTO: 0.18 THOUSAND/ÂΜL (ref 0–0.61)
EOSINOPHIL NFR BLD AUTO: 3 % (ref 0–6)
ERYTHROCYTE [DISTWIDTH] IN BLOOD BY AUTOMATED COUNT: 12 % (ref 11.6–15.1)
FINE GRAN CASTS URNS QL MICRO: ABNORMAL /LPF
GFR SERPL CREATININE-BSD FRML MDRD: 21 ML/MIN/1.73SQ M
GFR SERPL CREATININE-BSD FRML MDRD: 22 ML/MIN/1.73SQ M
GLUCOSE SERPL-MCNC: 116 MG/DL (ref 65–140)
GLUCOSE SERPL-MCNC: 274 MG/DL (ref 65–140)
GLUCOSE SERPL-MCNC: 398 MG/DL (ref 65–140)
GLUCOSE SERPL-MCNC: 421 MG/DL (ref 65–140)
GLUCOSE SERPL-MCNC: 444 MG/DL (ref 65–140)
GLUCOSE SERPL-MCNC: 454 MG/DL (ref 65–140)
GLUCOSE SERPL-MCNC: 479 MG/DL (ref 65–140)
GLUCOSE SERPL-MCNC: 91 MG/DL (ref 65–140)
GLUCOSE UR STRIP-MCNC: ABNORMAL MG/DL
HCO3 BLDV-SCNC: 23.1 MMOL/L (ref 24–30)
HCT VFR BLD AUTO: 41.4 % (ref 36.5–49.3)
HGB BLD-MCNC: 13.8 G/DL (ref 12–17)
HGB UR QL STRIP.AUTO: NEGATIVE
HYALINE CASTS #/AREA URNS LPF: ABNORMAL /LPF
IMM GRANULOCYTES # BLD AUTO: 0.07 THOUSAND/UL (ref 0–0.2)
IMM GRANULOCYTES NFR BLD AUTO: 1 % (ref 0–2)
KETONES UR STRIP-MCNC: NEGATIVE MG/DL
LEUKOCYTE ESTERASE UR QL STRIP: NEGATIVE
LYMPHOCYTES # BLD AUTO: 0.53 THOUSANDS/ÂΜL (ref 0.6–4.47)
LYMPHOCYTES NFR BLD AUTO: 9 % (ref 14–44)
MCH RBC QN AUTO: 29.5 PG (ref 26.8–34.3)
MCHC RBC AUTO-ENTMCNC: 33.3 G/DL (ref 31.4–37.4)
MCV RBC AUTO: 89 FL (ref 82–98)
MONOCYTES # BLD AUTO: 0.17 THOUSAND/ÂΜL (ref 0.17–1.22)
MONOCYTES NFR BLD AUTO: 3 % (ref 4–12)
NEUTROPHILS # BLD AUTO: 4.94 THOUSANDS/ÂΜL (ref 1.85–7.62)
NEUTS SEG NFR BLD AUTO: 84 % (ref 43–75)
NITRITE UR QL STRIP: NEGATIVE
NON-SQ EPI CELLS URNS QL MICRO: ABNORMAL /HPF
NRBC BLD AUTO-RTO: 0 /100 WBCS
O2 CT BLDV-SCNC: 10.8 ML/DL
P AXIS: 36 DEGREES
PCO2 BLDV: 50.7 MM HG (ref 42–50)
PH BLDV: 7.28 [PH] (ref 7.3–7.4)
PH UR STRIP.AUTO: 5 [PH]
PLATELET # BLD AUTO: 134 THOUSANDS/UL (ref 149–390)
PLATELET # BLD AUTO: 190 THOUSANDS/UL (ref 149–390)
PMV BLD AUTO: 10.6 FL (ref 8.9–12.7)
PMV BLD AUTO: 11 FL (ref 8.9–12.7)
PO2 BLDV: 28.9 MM HG (ref 35–45)
POTASSIUM SERPL-SCNC: 4.8 MMOL/L (ref 3.5–5.3)
POTASSIUM SERPL-SCNC: 4.8 MMOL/L (ref 3.5–5.3)
PR INTERVAL: 154 MS
PROT SERPL-MCNC: 7.7 G/DL (ref 6.4–8.4)
PROT UR STRIP-MCNC: ABNORMAL MG/DL
QRS AXIS: 7 DEGREES
QRSD INTERVAL: 98 MS
QT INTERVAL: 388 MS
QTC INTERVAL: 459 MS
RBC # BLD AUTO: 4.68 MILLION/UL (ref 3.88–5.62)
RBC #/AREA URNS AUTO: ABNORMAL /HPF
SODIUM SERPL-SCNC: 127 MMOL/L (ref 135–147)
SODIUM SERPL-SCNC: 129 MMOL/L (ref 135–147)
SODIUM UR-SCNC: 38 MMOL/L
SP GR UR STRIP.AUTO: 1.01 (ref 1–1.04)
T WAVE AXIS: 79 DEGREES
UROBILINOGEN UA: NEGATIVE MG/DL
VENTRICULAR RATE: 84 BPM
WBC # BLD AUTO: 5.91 THOUSAND/UL (ref 4.31–10.16)
WBC #/AREA URNS AUTO: ABNORMAL /HPF

## 2025-07-01 PROCEDURE — 84484 ASSAY OF TROPONIN QUANT: CPT | Performed by: FAMILY MEDICINE

## 2025-07-01 PROCEDURE — 82010 KETONE BODYS QUAN: CPT | Performed by: EMERGENCY MEDICINE

## 2025-07-01 PROCEDURE — 93005 ELECTROCARDIOGRAM TRACING: CPT

## 2025-07-01 PROCEDURE — 99285 EMERGENCY DEPT VISIT HI MDM: CPT

## 2025-07-01 PROCEDURE — 81001 URINALYSIS AUTO W/SCOPE: CPT | Performed by: FAMILY MEDICINE

## 2025-07-01 PROCEDURE — 93010 ELECTROCARDIOGRAM REPORT: CPT

## 2025-07-01 PROCEDURE — 71250 CT THORAX DX C-: CPT

## 2025-07-01 PROCEDURE — 84300 ASSAY OF URINE SODIUM: CPT | Performed by: FAMILY MEDICINE

## 2025-07-01 PROCEDURE — 71045 X-RAY EXAM CHEST 1 VIEW: CPT

## 2025-07-01 PROCEDURE — 36415 COLL VENOUS BLD VENIPUNCTURE: CPT | Performed by: EMERGENCY MEDICINE

## 2025-07-01 PROCEDURE — 80048 BASIC METABOLIC PNL TOTAL CA: CPT | Performed by: FAMILY MEDICINE

## 2025-07-01 PROCEDURE — 84484 ASSAY OF TROPONIN QUANT: CPT | Performed by: EMERGENCY MEDICINE

## 2025-07-01 PROCEDURE — 85025 COMPLETE CBC W/AUTO DIFF WBC: CPT | Performed by: EMERGENCY MEDICINE

## 2025-07-01 PROCEDURE — 99223 1ST HOSP IP/OBS HIGH 75: CPT | Performed by: FAMILY MEDICINE

## 2025-07-01 PROCEDURE — 85049 AUTOMATED PLATELET COUNT: CPT | Performed by: FAMILY MEDICINE

## 2025-07-01 PROCEDURE — 82805 BLOOD GASES W/O2 SATURATION: CPT | Performed by: EMERGENCY MEDICINE

## 2025-07-01 PROCEDURE — 82570 ASSAY OF URINE CREATININE: CPT | Performed by: FAMILY MEDICINE

## 2025-07-01 PROCEDURE — 80053 COMPREHEN METABOLIC PANEL: CPT | Performed by: EMERGENCY MEDICINE

## 2025-07-01 PROCEDURE — 83880 ASSAY OF NATRIURETIC PEPTIDE: CPT | Performed by: EMERGENCY MEDICINE

## 2025-07-01 PROCEDURE — 82948 REAGENT STRIP/BLOOD GLUCOSE: CPT

## 2025-07-01 PROCEDURE — 96361 HYDRATE IV INFUSION ADD-ON: CPT

## 2025-07-01 PROCEDURE — 99285 EMERGENCY DEPT VISIT HI MDM: CPT | Performed by: EMERGENCY MEDICINE

## 2025-07-01 PROCEDURE — 96374 THER/PROPH/DIAG INJ IV PUSH: CPT

## 2025-07-01 RX ORDER — SODIUM CHLORIDE 9 MG/ML
100 INJECTION, SOLUTION INTRAVENOUS CONTINUOUS
Status: DISCONTINUED | OUTPATIENT
Start: 2025-07-01 | End: 2025-07-03

## 2025-07-01 RX ORDER — METHOCARBAMOL 750 MG/1
1500 TABLET, FILM COATED ORAL EVERY 6 HOURS PRN
Status: DISCONTINUED | OUTPATIENT
Start: 2025-07-01 | End: 2025-07-09 | Stop reason: HOSPADM

## 2025-07-01 RX ORDER — ACETAMINOPHEN 325 MG/1
650 TABLET ORAL EVERY 6 HOURS PRN
Status: DISCONTINUED | OUTPATIENT
Start: 2025-07-01 | End: 2025-07-09 | Stop reason: HOSPADM

## 2025-07-01 RX ORDER — LIDOCAINE 50 MG/G
1 PATCH TOPICAL DAILY
Status: DISCONTINUED | OUTPATIENT
Start: 2025-07-01 | End: 2025-07-09 | Stop reason: HOSPADM

## 2025-07-01 RX ORDER — INSULIN LISPRO 100 [IU]/ML
1-6 INJECTION, SOLUTION INTRAVENOUS; SUBCUTANEOUS
Status: DISCONTINUED | OUTPATIENT
Start: 2025-07-02 | End: 2025-07-01

## 2025-07-01 RX ORDER — INSULIN GLARGINE 100 [IU]/ML
20 INJECTION, SOLUTION SUBCUTANEOUS
Status: DISCONTINUED | OUTPATIENT
Start: 2025-07-01 | End: 2025-07-01

## 2025-07-01 RX ORDER — CARVEDILOL 12.5 MG/1
12.5 TABLET ORAL 2 TIMES DAILY WITH MEALS
Status: DISCONTINUED | OUTPATIENT
Start: 2025-07-01 | End: 2025-07-07

## 2025-07-01 RX ORDER — INSULIN LISPRO 100 [IU]/ML
1-5 INJECTION, SOLUTION INTRAVENOUS; SUBCUTANEOUS
Status: DISCONTINUED | OUTPATIENT
Start: 2025-07-01 | End: 2025-07-01

## 2025-07-01 RX ORDER — ONDANSETRON 2 MG/ML
4 INJECTION INTRAMUSCULAR; INTRAVENOUS EVERY 6 HOURS PRN
Status: DISCONTINUED | OUTPATIENT
Start: 2025-07-01 | End: 2025-07-06

## 2025-07-01 RX ORDER — INSULIN LISPRO 100 [IU]/ML
1-6 INJECTION, SOLUTION INTRAVENOUS; SUBCUTANEOUS
Status: DISCONTINUED | OUTPATIENT
Start: 2025-07-01 | End: 2025-07-02

## 2025-07-01 RX ORDER — METOCLOPRAMIDE HYDROCHLORIDE 5 MG/ML
10 INJECTION INTRAMUSCULAR; INTRAVENOUS ONCE
Status: COMPLETED | OUTPATIENT
Start: 2025-07-01 | End: 2025-07-01

## 2025-07-01 RX ORDER — INSULIN LISPRO 100 [IU]/ML
8 INJECTION, SOLUTION INTRAVENOUS; SUBCUTANEOUS
Status: DISCONTINUED | OUTPATIENT
Start: 2025-07-02 | End: 2025-07-02

## 2025-07-01 RX ORDER — CARVEDILOL 12.5 MG/1
25 TABLET ORAL 2 TIMES DAILY WITH MEALS
Status: DISCONTINUED | OUTPATIENT
Start: 2025-07-01 | End: 2025-07-01

## 2025-07-01 RX ORDER — OXYCODONE HYDROCHLORIDE 5 MG/1
5 TABLET ORAL EVERY 4 HOURS PRN
Refills: 0 | Status: DISCONTINUED | OUTPATIENT
Start: 2025-07-01 | End: 2025-07-03

## 2025-07-01 RX ORDER — TRAMADOL HYDROCHLORIDE 50 MG/1
50 TABLET ORAL EVERY 8 HOURS PRN
Status: DISCONTINUED | OUTPATIENT
Start: 2025-07-01 | End: 2025-07-01

## 2025-07-01 RX ORDER — INSULIN LISPRO 100 [IU]/ML
8 INJECTION, SOLUTION INTRAVENOUS; SUBCUTANEOUS
Status: DISCONTINUED | OUTPATIENT
Start: 2025-07-01 | End: 2025-07-01

## 2025-07-01 RX ORDER — AMITRIPTYLINE HYDROCHLORIDE 10 MG/1
30 TABLET ORAL
Status: DISCONTINUED | OUTPATIENT
Start: 2025-07-01 | End: 2025-07-09 | Stop reason: HOSPADM

## 2025-07-01 RX ORDER — DEXAMETHASONE 4 MG/1
8 TABLET ORAL 2 TIMES DAILY WITH MEALS
Status: DISCONTINUED | OUTPATIENT
Start: 2025-07-01 | End: 2025-07-01

## 2025-07-01 RX ORDER — INSULIN GLARGINE 100 [IU]/ML
20 INJECTION, SOLUTION SUBCUTANEOUS
Status: DISCONTINUED | OUTPATIENT
Start: 2025-07-01 | End: 2025-07-02

## 2025-07-01 RX ORDER — INSULIN LISPRO 100 [IU]/ML
1-6 INJECTION, SOLUTION INTRAVENOUS; SUBCUTANEOUS
Status: DISCONTINUED | OUTPATIENT
Start: 2025-07-01 | End: 2025-07-01

## 2025-07-01 RX ORDER — PANTOPRAZOLE SODIUM 40 MG/10ML
40 INJECTION, POWDER, LYOPHILIZED, FOR SOLUTION INTRAVENOUS EVERY 12 HOURS SCHEDULED
Status: DISCONTINUED | OUTPATIENT
Start: 2025-07-01 | End: 2025-07-03

## 2025-07-01 RX ORDER — AMMONIUM LACTATE 12 G/100G
LOTION TOPICAL 2 TIMES DAILY PRN
Status: DISCONTINUED | OUTPATIENT
Start: 2025-07-01 | End: 2025-07-09 | Stop reason: HOSPADM

## 2025-07-01 RX ADMIN — SODIUM CHLORIDE 100 ML/HR: 0.9 INJECTION, SOLUTION INTRAVENOUS at 15:36

## 2025-07-01 RX ADMIN — SODIUM CHLORIDE 12 UNITS/HR: 9 INJECTION, SOLUTION INTRAVENOUS at 16:34

## 2025-07-01 RX ADMIN — LIDOCAINE 5% 1 PATCH: 700 PATCH TOPICAL at 15:32

## 2025-07-01 RX ADMIN — METOCLOPRAMIDE 10 MG: 5 INJECTION, SOLUTION INTRAMUSCULAR; INTRAVENOUS at 12:29

## 2025-07-01 RX ADMIN — EMPAGLIFLOZIN 25 MG: 25 TABLET, FILM COATED ORAL at 15:32

## 2025-07-01 RX ADMIN — PANTOPRAZOLE SODIUM 40 MG: 40 INJECTION, POWDER, FOR SOLUTION INTRAVENOUS at 22:48

## 2025-07-01 RX ADMIN — PANTOPRAZOLE SODIUM 40 MG: 40 INJECTION, POWDER, FOR SOLUTION INTRAVENOUS at 15:33

## 2025-07-01 RX ADMIN — INSULIN LISPRO 6 UNITS: 100 INJECTION, SOLUTION INTRAVENOUS; SUBCUTANEOUS at 15:27

## 2025-07-01 RX ADMIN — AMITRIPTYLINE HYDROCHLORIDE 30 MG: 10 TABLET, FILM COATED ORAL at 22:08

## 2025-07-01 RX ADMIN — INSULIN LISPRO 8 UNITS: 100 INJECTION, SOLUTION INTRAVENOUS; SUBCUTANEOUS at 15:30

## 2025-07-01 RX ADMIN — SODIUM CHLORIDE 1000 ML: 0.9 INJECTION, SOLUTION INTRAVENOUS at 21:19

## 2025-07-01 RX ADMIN — SODIUM CHLORIDE 1000 ML: 0.9 INJECTION, SOLUTION INTRAVENOUS at 12:46

## 2025-07-01 RX ADMIN — SODIUM CHLORIDE 1000 ML: 0.9 INJECTION, SOLUTION INTRAVENOUS at 12:25

## 2025-07-01 RX ADMIN — INSULIN GLARGINE 20 UNITS: 100 INJECTION, SOLUTION SUBCUTANEOUS at 22:54

## 2025-07-01 NOTE — ASSESSMENT & PLAN NOTE
Hypovolemic as well as pseudohyponatremia in setting of hyperglycemia  Sodium corrects to 133  Proceed with IV fluids, monitor BMP every 4 hours for now

## 2025-07-01 NOTE — ASSESSMENT & PLAN NOTE
Lab Results   Component Value Date    EGFR 21 07/01/2025    EGFR 38 06/21/2025    EGFR 43 06/03/2025    CREATININE 2.97 (H) 07/01/2025    CREATININE 1.84 (H) 06/21/2025    CREATININE 1.65 (H) 06/03/2025   Baseline creatinine 1.4-1.6, presents with severe VIVIANA with creatinine of 2.97  Please refer to plan under VIVIANA

## 2025-07-01 NOTE — ASSESSMENT & PLAN NOTE
This is a 60-year-old male patient with history of recently diagnosed esophageal cancer in May 2025, diabetes, recently diagnosed acute DVT, hypertension who started chemotherapy 6/26 and presents with p.o. intolerance, nausea, vomiting and diarrhea.  Found to have acute kidney injury with creatinine of 2.97 with baseline creatinine 1.4-1.6  Initially associated with hypotension responded to 2 L normal saline boluses  VIVIANA secondary to prerenal causes due to GI losses in setting of chemotherapy  Obtain urine studies for completion  Monitor for urinary retention  Aggressive IV hydration  Monitor BMP closely  If patient's renal function fails to improve, we will request nephrology evaluation      Lab Results   Component Value Date    EGFR 21 07/01/2025    EGFR 38 06/21/2025    EGFR 43 06/03/2025    CREATININE 2.97 (H) 07/01/2025    CREATININE 1.84 (H) 06/21/2025    CREATININE 1.65 (H) 06/03/2025

## 2025-07-01 NOTE — ED PROVIDER NOTES
Time reflects when diagnosis was documented in both MDM as applicable and the Disposition within this note       Time User Action Codes Description Comment    7/1/2025  1:59 PM Amarjit Fontenot Add [R42] Lightheadedness     7/1/2025  1:59 PM Amarjit Fontenot Add [I95.9] Hypotension           ED Disposition       ED Disposition   Admit    Condition   Stable    Date/Time   Tue Jul 1, 2025  1:59 PM    Comment   Case was discussed with SL and the patient's admission status was agreed to be Admission Status: inpatient status to the service of Dr. Loredo .               Assessment & Plan       Medical Decision Making  60 year old male with fatigue, light headedness, and n/v after receiving chemo for the first time   Patient arrives hypotensive but rest of vitals normal   Patient with no oral intake for past 3 days due to n/v    Possibly due to dehydration   Will assess for VIVIANA, electrolyte abnormalities, ACS, DKA (due to elevated glucose)  Labwork confirmed VIVIANA  Patient to be admitted to medicine for further management and care    Problems Addressed:  Hypotension: acute illness or injury  Lightheadedness: acute illness or injury    Amount and/or Complexity of Data Reviewed  Labs: ordered.  Radiology: ordered.    Risk  Prescription drug management.  Decision regarding hospitalization.             Medications   sodium chloride 0.9 % bolus 1,000 mL (0 mL Intravenous Stopped 7/1/25 1246)   metoclopramide (REGLAN) injection 10 mg (10 mg Intravenous Given 7/1/25 1229)   sodium chloride 0.9 % bolus 1,000 mL (1,000 mL Intravenous New Bag 7/1/25 1246)       ED Risk Strat Scores                    No data recorded        SBIRT 22yo+      Flowsheet Row Most Recent Value   Initial Alcohol Screen: US AUDIT-C     1. How often do you have a drink containing alcohol? 0 Filed at: 07/01/2025 1223   2. How many drinks containing alcohol do you have on a typical day you are drinking?  0 Filed at: 07/01/2025 1223   3a. Male UNDER 65: How often do you  "have five or more drinks on one occasion? 0 Filed at: 07/01/2025 1223   Audit-C Score 0 Filed at: 07/01/2025 1223   GARCÍA: How many times in the past year have you...    Used an illegal drug or used a prescription medication for non-medical reasons? Never Filed at: 07/01/2025 1223                            History of Present Illness       Chief Complaint   Patient presents with    Dizziness     Pt coming in from home, c/o dizziness, +V/D, chemo last week, \"everything taste like cramp,\" took zofran 1.5 hrs ago.        Past Medical History[1]   Past Surgical History[2]   Family History[3]   Social History[4]   E-Cigarette/Vaping    E-Cigarette Use Never User       E-Cigarette/Vaping Substances    Nicotine No     THC No     CBD No     Flavoring No     Other No     Unknown No       I have reviewed and agree with the history as documented.     HPI  60 year old male with hx of diabetes and recently diagnose esophageal cancer presents with generalized weakness, lightheadedness, nausea, vomiting after receiving his first round of chemo last week. Patient denies any fever. Denies any pain. Has not been able to keep any oral intake down for the past 3 days.     Review of Systems   Constitutional:  Positive for fatigue. Negative for chills, diaphoresis, fever and unexpected weight change.   HENT:  Negative for ear pain and sore throat.    Eyes:  Negative for visual disturbance.   Respiratory:  Negative for cough, chest tightness and shortness of breath.    Cardiovascular:  Negative for chest pain and leg swelling.   Gastrointestinal:  Negative for abdominal distention, abdominal pain, constipation, diarrhea, nausea and vomiting.   Endocrine: Negative.    Genitourinary:  Negative for difficulty urinating and dysuria.   Musculoskeletal: Negative.    Skin: Negative.    Allergic/Immunologic: Negative.    Neurological:  Positive for weakness and light-headedness.   Hematological: Negative.    Psychiatric/Behavioral: Negative.   "   All other systems reviewed and are negative.          Objective       ED Triage Vitals [07/01/25 1203]   Temperature Pulse Blood Pressure Respirations SpO2 Patient Position - Orthostatic VS   98 °F (36.7 °C) 85 (!) 81/62 (!) 28 97 % Sitting      Temp Source Heart Rate Source BP Location FiO2 (%) Pain Score    Oral Monitor Left arm -- No Pain      Vitals      Date and Time Temp Pulse SpO2 Resp BP Pain Score FACES Pain Rating User   07/01/25 1440 97.6 °F (36.4 °C) 71 -- 16 102/74 No Pain -- NS   07/01/25 1415 -- 67 96 % 12 104/58 -- -- FREDRICK   07/01/25 1400 -- 66 94 % -- 89/55 -- -- FREDRICK   07/01/25 1345 -- 63 96 % -- 88/55 -- -- FREDRICK   07/01/25 1330 -- 65 95 % 16 106/58 -- -- FREDRICK   07/01/25 1323 -- 70 99 % -- 94/57 -- -- FREDRICK   07/01/25 1300 -- 66 95 % -- 85/55 -- -- FREDRICK   07/01/25 1245 -- 67 96 % 15 80/47 -- -- FREDRICK   07/01/25 1230 -- 70 92 % -- 81/51 -- -- FREDRICK   07/01/25 1225 -- 71 92 % 20 81/53 -- -- FREDRICK   07/01/25 1215 -- 77 90 % -- 75/52 -- -- FREDRICK   07/01/25 1203 98 °F (36.7 °C) 85 97 % 28 81/62 No Pain -- EC            Physical Exam  Vitals and nursing note reviewed.   Constitutional:       General: He is not in acute distress.     Appearance: Normal appearance. He is not ill-appearing.   HENT:      Head: Normocephalic and atraumatic.      Right Ear: External ear normal.      Left Ear: External ear normal.      Nose: Nose normal.      Mouth/Throat:      Mouth: Mucous membranes are moist.      Pharynx: Oropharynx is clear.     Eyes:      General: No scleral icterus.        Right eye: No discharge.         Left eye: No discharge.      Extraocular Movements: Extraocular movements intact.      Conjunctiva/sclera: Conjunctivae normal.      Pupils: Pupils are equal, round, and reactive to light.       Cardiovascular:      Rate and Rhythm: Normal rate and regular rhythm.      Pulses: Normal pulses.      Heart sounds: Normal heart sounds.   Pulmonary:      Effort: Pulmonary effort is normal.      Breath sounds: Normal breath sounds.    Abdominal:      General: Abdomen is flat. Bowel sounds are normal. There is no distension.      Palpations: Abdomen is soft.      Tenderness: There is no abdominal tenderness. There is no guarding or rebound.     Musculoskeletal:         General: Normal range of motion.      Cervical back: Normal range of motion and neck supple.     Skin:     General: Skin is warm and dry.      Capillary Refill: Capillary refill takes less than 2 seconds.     Neurological:      General: No focal deficit present.      Mental Status: He is alert and oriented to person, place, and time. Mental status is at baseline.     Psychiatric:         Mood and Affect: Mood normal.         Behavior: Behavior normal.         Thought Content: Thought content normal.         Judgment: Judgment normal.         Results Reviewed       Procedure Component Value Units Date/Time    HS Troponin I 2hr [[id number]]  (Normal) Collected: 07/01/25 1424    Lab Status: Final result Specimen: Blood from Arm, Right Updated: 07/01/25 1455     hs TnI 2hr 7 ng/L      Delta 2hr hsTnI -1 ng/L     Basic metabolic panel [[id number]]  (Abnormal) Collected: 07/01/25 1424    Lab Status: Final result Specimen: Blood from Arm, Right Updated: 07/01/25 1455     Sodium 129 mmol/L      Potassium 4.8 mmol/L      Chloride 93 mmol/L      CO2 24 mmol/L      ANION GAP 12 mmol/L      BUN 90 mg/dL      Creatinine 2.87 mg/dL      Glucose 444 mg/dL      Calcium 8.3 mg/dL      eGFR 22 ml/min/1.73sq m     Narrative:      National Kid[employer] Foundation guidelines for Chronic Kidney Disease (CKD):     Stage 1 with normal or high GFR (GFR > 90 mL/min/1.73 square meters)    Stage 2 Mild CKD (GFR = 60-89 mL/min/1.73 square meters)    Stage 3A Moderate CKD (GFR = 45-59 mL/min/1.73 square meters)    Stage 3B Moderate CKD (GFR = 30-44 mL/min/1.73 square meters)    Stage 4 Severe CKD (GFR = 15-29 mL/min/1.73 square meters)    Stage 5 End Stage CKD (GFR <15 mL/min/1.73 square meters)  Note: GFR  calculation is accurate only with a steady state creatinine    Platelet count [[id number]]  (Abnormal) Collected: 07/01/25 1424    Lab Status: Final result Specimen: Blood from Arm, Right Updated: 07/01/25 1433     Platelets 134 Thousands/uL      MPV 10.6 fL     Beta Hydroxybutyrate [[id number]]  (Abnormal) Collected: 07/01/25 1223    Lab Status: Final result Specimen: Blood from Arm, Right Updated: 07/01/25 1331     Beta- Hydroxybutyrate 1.16 mmol/L     B-Type Natriuretic Peptide(BNP) [[id number]]  (Normal) Collected: 07/01/25 1223    Lab Status: Final result Specimen: Blood from Arm, Right Updated: 07/01/25 1329     BNP 32 pg/mL     Comprehensive metabolic panel [[id number]]  (Abnormal) Collected: 07/01/25 1223    Lab Status: Final result Specimen: Blood from Arm, Right Updated: 07/01/25 1322     Sodium 127 mmol/L      Potassium 4.8 mmol/L      Chloride 88 mmol/L      CO2 25 mmol/L      ANION GAP 14 mmol/L      BUN 92 mg/dL      Creatinine 2.97 mg/dL      Glucose 479 mg/dL      Calcium 9.3 mg/dL      AST 9 U/L      ALT 10 U/L      Alkaline Phosphatase 46 U/L      Total Protein 7.7 g/dL      Albumin 4.3 g/dL      Total Bilirubin 0.92 mg/dL      eGFR 21 ml/min/1.73sq m     Narrative:      National Kid[employer] Disease Foundation guidelines for Chronic Kidney Disease (CKD):     Stage 1 with normal or high GFR (GFR > 90 mL/min/1.73 square meters)    Stage 2 Mild CKD (GFR = 60-89 mL/min/1.73 square meters)    Stage 3A Moderate CKD (GFR = 45-59 mL/min/1.73 square meters)    Stage 3B Moderate CKD (GFR = 30-44 mL/min/1.73 square meters)    Stage 4 Severe CKD (GFR = 15-29 mL/min/1.73 square meters)    Stage 5 End Stage CKD (GFR <15 mL/min/1.73 square meters)  Note: GFR calculation is accurate only with a steady state creatinine    HS Troponin 0hr (reflex protocol) [[id number]]  (Normal) Collected: 07/01/25 1223    Lab Status: Final result Specimen: Blood from Arm, Right Updated: 07/01/25 1321     hs TnI 0hr 8 ng/L     Blood gas,  venous [[id number]]  (Abnormal) Collected: 07/01/25 1223    Lab Status: Final result Specimen: Blood from Arm, Right Updated: 07/01/25 1301     pH, Hal 7.276     pCO2, Hal 50.7 mm Hg      pO2, Hal 28.9 mm Hg      HCO3, Hal 23.1 mmol/L      Base Excess, Hal -4.2 mmol/L      O2 Content, Hal 10.8 ml/dL      O2 HGB, VENOUS 51.2 %     CBC and differential [[id number]]  (Abnormal) Collected: 07/01/25 1223    Lab Status: Final result Specimen: Blood from Arm, Right Updated: 07/01/25 1300     WBC 5.91 Thousand/uL      RBC 4.68 Million/uL      Hemoglobin 13.8 g/dL      Hematocrit 41.4 %      MCV 89 fL      MCH 29.5 pg      MCHC 33.3 g/dL      RDW 12.0 %      MPV 11.0 fL      Platelets 190 Thousands/uL      nRBC 0 /100 WBCs      Segmented % 84 %      Immature Grans % 1 %      Lymphocytes % 9 %      Monocytes % 3 %      Eosinophils Relative 3 %      Basophils Relative 0 %      Absolute Neutrophils 4.94 Thousands/µL      Absolute Immature Grans 0.07 Thousand/uL      Absolute Lymphocytes 0.53 Thousands/µL      Absolute Monocytes 0.17 Thousand/µL      Eosinophils Absolute 0.18 Thousand/µL      Basophils Absolute 0.02 Thousands/µL     Fingerstick Glucose (POCT) [[id number]]  (Abnormal) Collected: 07/01/25 1204    Lab Status: Final result Specimen: Blood Updated: 07/01/25 1206     POC Glucose 454 mg/dl             CT chest without contrast   Final Interpretation by Judd Ordaz MD (07/01 1436)      No acute findings in the chest.      Mild mural thickening of the distal esophagus in keeping with history of esophageal cancer.      Borderline/minimally enlarged periportal and upper retroperitoneal lymph nodes, nonspecific, unchanged from previous examination.         Computerized Assisted Algorithm (CAA) may have aided analysis of applicable images.            Resident: SINGH LIZ I, the attending radiologist, have reviewed the images and agree with the final report above.      Workstation performed: [id number]          XR chest 1 view portable    (Results Pending)       Procedures    ED Medication and Procedure Management   Prior to Admission Medications   Prescriptions Last Dose Informant Patient Reported? Taking?   Blood Glucose Monitoring Suppl (ONE TOUCH ULTRA MINI) w/Device KIT 2025 Self No Yes   Sig: Once   Blood Glucose Monitoring Suppl w/Device KIT 2025  No Yes   Sig: Please supply which ever monitor, test strips, and lancets Patient insurance covers   Empagliflozin (Jardiance) 25 MG TABS 2025 Self No Yes   Sig: Take 1 tablet (25 mg total) by mouth daily   Insulin Glargine Solostar (Lantus SoloStar) 100 UNIT/ML SOPN 2025 Self No Yes   Si units at bedtime   Insulin Pen Needle (BD Pen Needle [place] U/F) 32G X 4 MM MISC 2025 Self No Yes   Si/day   Ubrogepant (UBRELVY) 100 MG tablet 2025 Self No Yes   Sig: Take 1 tablet (100 mg) one time as needed for migraine. May repeat one additional tablet (100 mg) at least two hours after the first dose. Do not use more than two doses per day, or for more than twelve days per month.   acetaminophen (TYLENOL) 500 mg tablet 2025 Self No Yes   Sig: Take 2 tablets (1,000 mg total) by mouth every 8 (eight) hours   amitriptyline (ELAVIL) 10 mg tablet 2025 Self No Yes   Simg at bedtime   ammonium lactate (LAC-HYDRIN) 12 % lotion Unknown Self Yes No   Sig: as needed in the morning and as needed in the evening.   carvedilol (COREG) 25 mg tablet 2025 Self No Yes   Sig: Take 1 tablet (25 mg total) by mouth 2 (two) times a day with meals   dexamethasone (DECADRON) 4 mg tablet 2025 Self No Yes   Sig: Take 2 tablets (8 mg total) by mouth in the morning and 2 tablets (8 mg total) in the evening. Take with meals. Take 2 tabs by mouth twice daily the day before, the day of, and the day after chemo.   fluorouracil 6,420 mg in CADD/Elastomeric Infusion Device  Self No No   Sig: Infuse 6,420 mg (2,600 mg/m2/day x 2.47 m2) into a catheter in a vein via  infusion device over 24 hours for 1 day  Infusion planned for 2025.   hydroCHLOROthiazide 12.5 mg tablet 2025 Self No Yes   Sig: Take 1 tablet (12.5 mg total) by mouth 2 (two) times a day   insulin aspart (NovoLOG FlexPen) 100 UNIT/ML injection pen 2025 Self No Yes   Si units before meals Correction scale 1 unit for 30 mg/dl above 140   ketoconazole (NIZORAL) 2 % cream Unknown Self Yes No   Sig: if needed   lidocaine (LMX) 4 % cream Unknown Self No No   Sig: Apply topically as needed for moderate pain   lidocaine (Lidoderm) 5 % Unknown Self No No   Sig: Apply 1 patch topically over 12 hours daily Remove & Discard patch within 12 hours or as directed by MD   lidocaine-prilocaine (EMLA) cream Unknown Self No No   Sig: Apply topically as needed for mild pain   lisinopril-hydrochlorothiazide (PRINZIDE,ZESTORETIC) 20-12.5 MG per tablet 2025 Self No Yes   Sig: Take 1 tablet by mouth in the morning and 1 tablet before bedtime.   methocarbamol (ROBAXIN) 750 mg tablet Not Taking Self No No   Sig: Take 2 tablets (1,500 mg total) by mouth every 8 (eight) hours   Patient not taking: Reported on 2025   ondansetron (ZOFRAN-ODT) 8 mg disintegrating tablet 2025 Self No Yes   Sig: Take 1 tablet (8 mg total) by mouth every 8 (eight) hours as needed for nausea or vomiting   pantoprazole (PROTONIX) 40 mg tablet 2025 Self No Yes   Sig: Take 1 tablet (40 mg total) by mouth 2 (two) times a day before meals   rivaroxaban (Xarelto Starter Pack) 15 & 20 MG starter pack Not Taking Self No No   Sig: Take 15 mg by mouth twice daily for 21 days, then 20 mg once daily thereafter.   Patient not taking: Reported on 2025   traMADol (Ultram) 50 mg tablet 2025 Morning Self No Yes   Sig: Take 1 tablet (50 mg total) by mouth every 8 (eight) hours as needed for moderate pain and 2 pills at bedtime at least 6 hours after last dose of the day      Facility-Administered Medications: None     Current Discharge  Medication List        CONTINUE these medications which have NOT CHANGED    Details   acetaminophen (TYLENOL) 500 mg tablet Take 2 tablets (1,000 mg total) by mouth every 8 (eight) hours  Qty: 60 tablet, Refills: 0    Associated Diagnoses: Right wrist tendonitis; Stiffness of finger joint of right hand; Wrist stiffness, right; Tenosynovitis of finger and hand; Aftercare following surgery of the musculoskeletal system      amitriptyline (ELAVIL) 10 mg tablet 30mg at bedtime  Qty: 90 tablet, Refills: 5    Associated Diagnoses: Chronic migraine without aura without status migrainosus, not intractable      !! Blood Glucose Monitoring Suppl (ONE TOUCH ULTRA MINI) w/Device KIT Once  Qty: 1 kit, Refills: 0    Associated Diagnoses: Type 2 diabetes mellitus with hyperglycemia, without long-term current use of insulin ([facility])      !! Blood Glucose Monitoring Suppl w/Device KIT Please supply which ever monitor, test strips, and lancets Patient insurance covers  Qty: 1 kit, Refills: 0    Associated Diagnoses: Type 2 diabetes mellitus with hyperglycemia, without long-term current use of insulin ([facility]); Type 2 diabetes mellitus with chronic kidney disease, without long-term current use of insulin, unspecified CKD stage ([facility])      carvedilol (COREG) 25 mg tablet Take 1 tablet (25 mg total) by mouth 2 (two) times a day with meals  Qty: 200 tablet, Refills: 1    Associated Diagnoses: Essential hypertension      dexamethasone (DECADRON) 4 mg tablet Take 2 tablets (8 mg total) by mouth in the morning and 2 tablets (8 mg total) in the evening. Take with meals. Take 2 tabs by mouth twice daily the day before, the day of, and the day after chemo.  Qty: 12 tablet, Refills: 3    Associated Diagnoses: Esophageal carcinoma (HCC)      Empagliflozin (Jardiance) 25 MG TABS Take 1 tablet (25 mg total) by mouth daily  Qty: 90 tablet, Refills: 1    Associated Diagnoses: Type 2 diabetes mellitus with other specified complication, without long-term  current use of insulin ([facility])      hydroCHLOROthiazide 12.5 mg tablet Take 1 tablet (12.5 mg total) by mouth 2 (two) times a day  Qty: 100 tablet, Refills: 3    Associated Diagnoses: Edema, unspecified type      insulin aspart (NovoLOG FlexPen) 100 UNIT/ML injection pen 8 units before meals Correction scale 1 unit for 30 mg/dl above 140  Qty: 15 mL, Refills: 1    Associated Diagnoses: Type 2 diabetes mellitus with hyperglycemia, without long-term current use of insulin ([facility])      Insulin Glargine Solostar (Lantus SoloStar) 100 UNIT/ML SOPN 20 units at bedtime  Qty: 15 mL, Refills: 1    Associated Diagnoses: Type 2 diabetes mellitus with hyperglycemia, without long-term current use of insulin ([facility])      Insulin Pen Needle (BD Pen Needle [place] U/F) 32G X 4 MM MISC 4/day  Qty: 100 each, Refills: 3    Associated Diagnoses: Type 2 diabetes mellitus with hyperglycemia, without long-term current use of insulin ([facility])      lisinopril-hydrochlorothiazide (PRINZIDE,ZESTORETIC) 20-12.5 MG per tablet Take 1 tablet by mouth in the morning and 1 tablet before bedtime.  Qty: 200 tablet, Refills: 1    Associated Diagnoses: Essential hypertension      ondansetron (ZOFRAN-ODT) 8 mg disintegrating tablet Take 1 tablet (8 mg total) by mouth every 8 (eight) hours as needed for nausea or vomiting  Qty: 20 tablet, Refills: 0    Associated Diagnoses: Esophageal carcinoma ([facility]); Nausea      pantoprazole (PROTONIX) 40 mg tablet Take 1 tablet (40 mg total) by mouth 2 (two) times a day before meals  Qty: 60 tablet, Refills: 3    Associated Diagnoses: Esophagitis      traMADol (Ultram) 50 mg tablet Take 1 tablet (50 mg total) by mouth every 8 (eight) hours as needed for moderate pain and 2 pills at bedtime at least 6 hours after last dose of the day  Qty: 100 tablet, Refills: 0    Associated Diagnoses: Malignant neoplasm of lower third of esophagus ([facility])      Ubrogepant (UBRELVY) 100 MG tablet Take 1 tablet (100 mg) one time as needed for  migraine. May repeat one additional tablet (100 mg) at least two hours after the first dose. Do not use more than two doses per day, or for more than twelve days per month.  Qty: 12 tablet, Refills: 3    Associated Diagnoses: Chronic migraine without aura without status migrainosus, not intractable      ammonium lactate (LAC-HYDRIN) 12 % lotion as needed in the morning and as needed in the evening.      fluorouracil 6,420 mg in CADD/Elastomeric Infusion Device Infuse 6,420 mg (2,600 mg/m2/day x 2.47 m2) into a catheter in a vein via infusion device over 24 hours for 1 day  Infusion planned for July 9, 2025.  Qty: 1 each, Refills: 0    Associated Diagnoses: Malignant neoplasm of lower third of esophagus (HCC)      ketoconazole (NIZORAL) 2 % cream if needed      lidocaine (Lidoderm) 5 % Apply 1 patch topically over 12 hours daily Remove & Discard patch within 12 hours or as directed by MD  Qty: 30 patch, Refills: 0    Associated Diagnoses: Low back pain with sciatica, sciatica laterality unspecified, unspecified back pain laterality, unspecified chronicity      lidocaine (LMX) 4 % cream Apply topically as needed for moderate pain  Qty: 30 g, Refills: 0    Associated Diagnoses: Superficial thrombophlebitis      lidocaine-prilocaine (EMLA) cream Apply topically as needed for mild pain  Qty: 100 g, Refills: 1    Associated Diagnoses: Esophageal carcinoma (HCC); Port-A-Cath in place      methocarbamol (ROBAXIN) 750 mg tablet Take 2 tablets (1,500 mg total) by mouth every 8 (eight) hours  Qty: 60 tablet, Refills: 2    Associated Diagnoses: DDD (degenerative disc disease), lumbar      rivaroxaban (Xarelto Starter Pack) 15 & 20 MG starter pack Take 15 mg by mouth twice daily for 21 days, then 20 mg once daily thereafter.  Qty: 51 each, Refills: 0    Associated Diagnoses: Superficial thrombophlebitis       !! - Potential duplicate medications found. Please discuss with provider.        No discharge procedures on file.  ED  SEPSIS DOCUMENTATION   Time reflects when diagnosis was documented in both MDM as applicable and the Disposition within this note       Time User Action Codes Description Comment    7/1/2025  1:59 PM Amarjit Fontenot Add [R42] Lightheadedness     7/1/2025  1:59 PM Amarjit Fontenot Add [I95.9] Hypotension                      [1]   Past Medical History:  Diagnosis Date    Allergic 1968    Seasonal    Arthritis 2012    Brain concussion Multiple    Cervical disc disease     Chronic kidney disease 2012    Chronic pain disorder     CTS (carpal tunnel syndrome) 2002    Diabetes mellitus (HCC)     Esophageal cancer (HCC)     Full dentures     only using upper    Head injury 1975    Headache(784.0) 1980    High blood sugar     Hypertension     Kidney stone     Liver disease     Low back pain     Cannot remember    Lumbar disc disease     Lumbosacral disc disease     Cannot remember    Migraines     Neuropathy in diabetes (HCC)     RLS (restless legs syndrome)     Skin cancer     in past    Thoracic disc disorder     Cannot remember    Visual impairment 2017    Cataracts. Surgery to correct in 2021   [2]   Past Surgical History:  Procedure Laterality Date    AMPUTATION  2022    Little toe left    CARPAL TUNNEL RELEASE  2002    CATARACT EXTRACTION Bilateral     COLONOSCOPY      CONTRACTURE Right 01/20/2025    Procedure: Right wrist and hand extensor tenolysis and wrist capsulotomy;  Surgeon: Carroll Mccarthy MD;  Location: [facility];  Service: Orthopedics    EYE SURGERY  2021    FOOT SURGERY  2022    Left Foot    FRACTURE SURGERY  1968    Right Tib/Fib    GANGLION CYST EXCISION Left 03/25/2024    Procedure: EXCISION MUCOID CYST, INDEX FINGER DIP;  Surgeon: Carroll Mccarthy MD;  Location: [facility];  Service: Orthopedics    GANGLION CYST EXCISION Right 05/20/2024    Procedure: EXCISION MUCOID CYST - RIGHT INDEX AND MIDDLE FINGERS;  Surgeon: Carroll Mccarthy MD;  Location: [facility];  Service: Orthopedics    HERNIA  REPAIR      INCISION AND DRAINAGE  01/16/2024    IR NEPHROURETERAL ACCESS FOR UROLOGY PCNL  04/07/2025    IR PORT PLACEMENT  6/16/2025    KIDNEY SURGERY Right 06/2012    KNEE SURGERY Right 1980    MOUTH SURGERY      AR AMPUTATION METATARSAL W/TOE SINGLE Left 6/1/2022    Procedure: RAY RESECTION FOOT;  Surgeon: Hannah Melgoza DPM;  [facility] OR;  Service: Podiatry    AR INCISION & DRAINAGE ABSCESS COMPLICATED/MULTIPLE Right 09/17/2024    Procedure: Irrigation and debridement right wrist and hand, any indicated procedures;  Surgeon: Carroll Mccarthy MD;  [facility];  Service: Orthopedics    AR INCISION EXTENSOR TENDON SHEATH WRIST Right 01/20/2025    Procedure: RELEASE DEQUERVAINS - Right;  Surgeon: Carroll Mccarthy MD;  [facility];  Service: Orthopedics    AR PERQ NL/PL LITHOTRP COMPLEX >2 CM MLT LOCATIONS Right 04/11/2025    Procedure: NEPHROLITHOTOMY  PERCUTANEOUS (PCNL);  Surgeon: Adis Hamilton MD;  [facility];  Service: Urology    AR RPR AA HERNIA 1ST < 3 CM REDUCIBLE N/A 7/14/2023    Procedure: REPAIR HERNIA INCISIONAL;  Surgeon: Matt Melo MD;  [facility];  Service: General    AR SYNOVECTOMY EXTENSOR TENDON SHTH WRIST 1 CMPRT Right 10/03/2024    Procedure: Irrigation and debridement, right wrist, volar and dorsal, possible extensor and flexor tenosynovectomy, any indicated procedures;  Surgeon: Carroll Mccarthy MD;  [facility];  Service: Orthopedics    AR SYNOVECTOMY EXTENSOR TENDON SHTH WRIST 1 CMPRT Right 03/24/2025    Procedure: SYNOVECTOMY / DEBRIDEMENT - FLEXOR CARPI RADIALIS AND FLEXOR CARPI ULNARIS TENDONS- RIGHT;  Surgeon: Carroll Mccarthy MD;  [facility];  Service: Orthopedics    TONSILLECTOMY  1968    Yes    WOUND DEBRIDEMENT Left 4/28/2022    Procedure: Left foot washout;  Surgeon: Hannah Melgoza DPM;  [facility];  Service: Podiatry    WOUND DEBRIDEMENT Left 6/6/2022    Procedure:  DEBRIDEMENT WOUND (WASH OUT);  Surgeon: Hannah Melgoza DPM;  [facility];  Service: Podiatry   [3]   Family History  Problem Relation Name Age of Onset    Diabetes Paternal Grandmother Michelle     Diabetes Paternal Grandfather Maurice     Arthritis Maternal Grandmother Greatgrandmother  Corie    [4]   Social History  Tobacco Use    Smoking status: Never    Smokeless tobacco: Never   Vaping Use    Vaping status: Never Used   Substance Use Topics    Alcohol use: Yes     Alcohol/week: 0.0 - 1.0 standard drinks of alcohol     Comment: ocassional    Drug use: Never        Amarjit Fontenot MD  07/01/25 1308

## 2025-07-01 NOTE — TELEPHONE ENCOUNTER
Yoni was scheduled for an RD appt today, 7/1/25. He called this morning and stated he is not feeling well and will be going to the ED so he would like to cancel our appt. He will call RD once he's back home to reschedule appt.

## 2025-07-01 NOTE — ASSESSMENT & PLAN NOTE
Follows with heme-onc Dr. Novak  On chemotherapy with fluorouracil, leucovorin, oxaliplatin, docetaxel every 14 days, completed 6/26, next cycle scheduled for 7/9

## 2025-07-01 NOTE — ASSESSMENT & PLAN NOTE
Patient presents with volume depletion, VIVIANA, hyponatremia  Hold lisinopril, HCTZ  Continue carvedilol at decreased dose for now, with holding parameters

## 2025-07-01 NOTE — ASSESSMENT & PLAN NOTE
Lab Results   Component Value Date    HGBA1C 10.4 (H) 04/06/2025       Recent Labs     07/01/25  1204   POCGLU 454*       Blood Sugar Average: Last 72 hrs:  (P) 454    Uncontrolled in setting of recent steroid therapy  Patient follows with endocrinology  Currently on Lantus 20 units, Humalog 8 units 3 times daily, Jardiance  Patient presents with VIVIANA, p.o. intolerance with elevated beta hydroxybutyrate, mildly increased anion gap, however normal carbon bicarbonate, therefore do not suspect DKA  With persistently elevated blood glucose in the 400s will initiate on insulin drip, transition to subcutaneous in the next 24 hours

## 2025-07-01 NOTE — TELEPHONE ENCOUNTER
Provider: Dr. Novak    REASON FOR CONVERSATION: Vomiting    SYMPTOMS: Patient has not been able to keep and food or fluids down for about three days now is dizzy and has fallen 3 times.    OTHER HEALTH INFORMATION: Esophagus Cx, on treatment. Had treatment last on 6/25, cadd removed on 6/26. Patient is also diabetic and has not checked his sugar since Friday.    PROTOCOL DISPOSITION: Go to ED Now    CARE ADVICE PROVIDED: Instructed patient to proceed to the ED for evaluation     PRACTICE FOLLOW-UP: None needed at this time.       Reason for Disposition   Patient sounds very sick or weak to the triager or a serious complication    Answer Assessment - Initial Assessment Questions  1. What is your cancer diagnosis, and what treatment(s) are you receiving? Review past medical history. When did you receive your last chemotherapy treatment or undergo a transplant? What chemotherapy did the patient receive?   Esophageal cx    2. What medications are you currently taking? Are you taking an opioid, or new medications? If yes, what are they? Are you currently using any antiemetic medications? (medications that help with nausea or vomiting) If yes, what are they? Include last dosages of medications.   Please see medication list    3. Obtain a history of the problem. Please describe your symptoms in detail, including severity, precipitating/relieving factors, onset and duration. What color is the emesis? Is there any blood or coffee-ground emesis?  Patient reports he has not been able to eat or drink for about 3 days.       5. What is your food and fluid intake over the past 24 hours? Any associated symptoms such as signs of dehydration (e.g., decreased urine output, fever, thirst, dry mucous membranes, weakness, dizziness, confusion)? When was the last time patient urinated?  Unable to keep food and liquids down.    Protocols used: ONC-Nausea and Vomiting-ADULT-OH

## 2025-07-01 NOTE — PLAN OF CARE
Problem: Potential for Falls  Goal: Patient will remain free of falls  Description: INTERVENTIONS:  - Educate patient/family on patient safety including physical limitations  - Instruct patient to call for assistance with activity   - Consider consulting OT/PT to assist with strengthening/mobility based on AM PAC & JH-HLM score  - Consult OT/PT to assist with strengthening/mobility   - Keep Call bell within reach  - Keep bed low and locked with side rails adjusted as appropriate  - Keep care items and personal belongings within reach  - Initiate and maintain comfort rounds  - Make Fall Risk Sign visible to staff  - Apply yellow socks and bracelet for high fall risk patients  - Consider moving patient to room near nurses station  7/1/2025 1451 by Kaylie Lion RN  Outcome: Progressing  7/1/2025 1451 by Kaylie Lion RN  Outcome: Progressing     Problem: PAIN - ADULT  Goal: Verbalizes/displays adequate comfort level or baseline comfort level  Description: Interventions:  - Encourage patient to monitor pain and request assistance  - Assess pain using appropriate pain scale  - Administer analgesics as ordered based on type and severity of pain and evaluate response  - Implement non-pharmacological measures as appropriate and evaluate response  - Consider cultural and social influences on pain and pain management  - Notify physician/advanced practitioner if interventions unsuccessful or patient reports new pain  - Educate patient/family on pain management process including their role and importance of  reporting pain   - Provide non-pharmacologic/complimentary pain relief interventions  7/1/2025 1451 by Kaylie Lion RN  Outcome: Progressing  7/1/2025 1451 by Kaylie Lion RN  Outcome: Progressing

## 2025-07-01 NOTE — H&P
H&P - Hospitalist   Name: Yoni Castillo 60 y.o. male I MRN: [id number]  Unit/Bed#: 7T [facility] 706-01 I Date of Admission: 7/1/2025   Date of Service: 7/1/2025 I Hospital Day: 0     Assessment & Plan  Acute kidney injury superimposed on stage 3a chronic kidney disease (HCC)  This is a 60-year-old male patient with history of recently diagnosed esophageal cancer in May 2025, diabetes, recently diagnosed acute DVT, hypertension who started chemotherapy 6/26 and presents with p.o. intolerance, nausea, vomiting and diarrhea.  Found to have acute kidney injury with creatinine of 2.97 with baseline creatinine 1.4-1.6  Initially associated with hypotension responded to 2 L normal saline boluses  VIVIANA secondary to prerenal causes due to GI losses in setting of chemotherapy  Obtain urine studies for completion  Monitor for urinary retention  Aggressive IV hydration  Monitor BMP closely  If patient's renal function fails to improve, we will request nephrology evaluation      Lab Results   Component Value Date    EGFR 21 07/01/2025    EGFR 38 06/21/2025    EGFR 43 06/03/2025    CREATININE 2.97 (H) 07/01/2025    CREATININE 1.84 (H) 06/21/2025    CREATININE 1.65 (H) 06/03/2025     Essential hypertension  Patient presents with volume depletion, VIVIANA, hyponatremia  Hold lisinopril, HCTZ  Continue carvedilol at decreased dose for now, with holding parameters  Type 2 diabetes mellitus with chronic kidney disease, without long-term current use of insulin ([facility])  Lab Results   Component Value Date    HGBA1C 10.4 (H) 04/06/2025       Recent Labs     07/01/25  1204   POCGLU 454*       Blood Sugar Average: Last 72 hrs:  (P) 454    Uncontrolled in setting of recent steroid therapy  Patient follows with endocrinology  Currently on Lantus 20 units, Humalog 8 units 3 times daily, Jardiance  Patient presents with VIVIANA, p.o. intolerance with elevated beta hydroxybutyrate, mildly increased anion gap, however normal carbon bicarbonate, therefore do not  suspect DKA  With persistently elevated blood glucose in the 400s will initiate on insulin drip, transition to subcutaneous in the next 24 hours  Chronic kidney disease, stage 3 (HCC)  Lab Results   Component Value Date    EGFR 21 07/01/2025    EGFR 38 06/21/2025    EGFR 43 06/03/2025    CREATININE 2.97 (H) 07/01/2025    CREATININE 1.84 (H) 06/21/2025    CREATININE 1.65 (H) 06/03/2025   Baseline creatinine 1.4-1.6, presents with severe VIVIANA with creatinine of 2.97  Please refer to plan under VIVIANA  Opioid dependence, uncomplicated (HCC)  Maintained on tramadol  Proceed with oxycodone while here due to VIVIANA  Hyponatremia  Hypovolemic as well as pseudohyponatremia in setting of hyperglycemia  Sodium corrects to 133  Proceed with IV fluids, monitor BMP every 4 hours for now  Malignant neoplasm of lower third of esophagus (HCC)  Follows with heme-onc Dr. Novak  On chemotherapy with fluorouracil, leucovorin, oxaliplatin, docetaxel every 14 days, completed 6/26, next cycle scheduled for 7/9    Deep vein thrombosis (DVT) of femoral vein of left lower extremity (HCC)  In setting of hypercoagulability of malignancy  Prescribed Xarelto for 6 months therapy      VTE Pharmacologic Prophylaxis: VTE Score: 5 High Risk (Score >/= 5) - Pharmacological DVT Prophylaxis Ordered: heparin. Sequential Compression Devices Ordered.  Code Status: Level 1 - Full Code   Discussion with family: Patient's significant other at bedside    Anticipated Length of Stay: Patient will be admitted on an inpatient basis with an anticipated length of stay of greater than 2 midnights secondary to severe VIVIANA, need for IV treatment, close monitoring.    History of Present Illness   Chief Complaint: Fatigue, nausea, vomiting    Yoni Castillo is a 60 y.o. male with a PMH of esophageal cancer on chemotherapy, diabetes, hypertension, DVT who presents with fatigue, p.o. intolerance with nausea, vomiting as well as diarrhea after his last chemotherapy on 6/26/2025.   The patient denies abdominal pain.  He denies chills or fevers.  He denies chest pain, shortness of breath or palpitations.  Does report decreased urine output due to his decreased oral intake.  Denies lower extremity edema.    Review of Systems   Constitutional:  Positive for appetite change and fatigue.   HENT:  Negative for congestion.    Eyes:  Negative for visual disturbance.   Respiratory:  Negative for shortness of breath and wheezing.    Cardiovascular:  Negative for chest pain, palpitations and leg swelling.   Gastrointestinal:  Positive for diarrhea, nausea and vomiting. Negative for abdominal pain.   Endocrine: Negative for polydipsia and polyuria.   Genitourinary:  Positive for decreased urine volume.   Musculoskeletal:  Negative for gait problem.   Skin:  Negative for color change.   Neurological:  Negative for dizziness.   Hematological:  Negative for adenopathy.   Psychiatric/Behavioral:  Negative for confusion.        Historical Information   Past Medical History[1]  Past Surgical History[2]  Social History[3]  E-Cigarette/Vaping    E-Cigarette Use Never User      E-Cigarette/Vaping Substances    Nicotine No     THC No     CBD No     Flavoring No     Other No     Unknown No      Family history non-contributory  Social History:  Marital Status: Registered Domestic Partner   Patient Pre-hospital Living Situation: Home  Patient Pre-hospital Level of Mobility: walks  Patient Pre-hospital Diet Restrictions: none    Meds/Allergies     Prior to Admission medications    Medication Sig Start Date End Date Taking? Authorizing Provider   acetaminophen (TYLENOL) 500 mg tablet Take 2 tablets (1,000 mg total) by mouth every 8 (eight) hours 3/24/25   Yesenia Carrasquillo PA-C   amitriptyline (ELAVIL) 10 mg tablet 30mg at bedtime 6/3/25   JAN Cruz   ammonium lactate (LAC-HYDRIN) 12 % lotion as needed in the morning and as needed in the evening. 1/25/24   Historical Provider, MD   Blood Glucose  Monitoring Suppl (ONE TOUCH ULTRA MINI) w/Device KIT Once 6/25/25   Marissa Meyer MD   Blood Glucose Monitoring Suppl w/Device KIT Please supply which ever monitor, test strips, and lancets Patient insurance covers 7/1/25   Marissa Meyer MD   carvedilol (COREG) 25 mg tablet Take 1 tablet (25 mg total) by mouth 2 (two) times a day with meals 6/3/25   Judd Ji MD   dexamethasone (DECADRON) 4 mg tablet Take 2 tablets (8 mg total) by mouth in the morning and 2 tablets (8 mg total) in the evening. Take with meals. Take 2 tabs by mouth twice daily the day before, the day of, and the day after chemo. 6/16/25   Daryl Novak MD   Empagliflozin (Jardiance) 25 MG TABS Take 1 tablet (25 mg total) by mouth daily 6/16/25   Marissa Meyer MD   fluorouracil 6,420 mg in CADD/Elastomeric Infusion Device Infuse 6,420 mg (2,600 mg/m2/day x 2.47 m2) into a catheter in a vein via infusion device over 24 hours for 1 day  Infusion planned for July 9, 2025. 7/9/25 7/10/25  Daryl Novak MD   hydroCHLOROthiazide 12.5 mg tablet Take 1 tablet (12.5 mg total) by mouth 2 (two) times a day 5/20/25   Judd Ji MD   insulin aspart (NovoLOG FlexPen) 100 UNIT/ML injection pen 8 units before meals Correction scale 1 unit for 30 mg/dl above 140 6/24/25   Marissa Meyer MD   Insulin Glargine Solostar (Lantus SoloStar) 100 UNIT/ML SOPN 20 units at bedtime 6/24/25   Marissa Meyer MD   Insulin Pen Needle (BD Pen Needle [place] U/F) 32G X 4 MM MISC 4/day 6/24/25   Marissa Meyer MD   ketoconazole (NIZORAL) 2 % cream if needed 1/25/24   Historical Provider, MD   lidocaine (Lidoderm) 5 % Apply 1 patch topically over 12 hours daily Remove & Discard patch within 12 hours or as directed by MD 2/25/25   Judd Ji MD   lidocaine (LMX) 4 % cream Apply topically as needed for moderate pain 6/3/25   Matt Sorenson MD   lidocaine-prilocaine (EMLA) cream Apply topically as needed for mild pain 6/11/25   Daryl Novak MD    lisinopril-hydrochlorothiazide (PRINZIDE,ZESTORETIC) 20-12.5 MG per tablet Take 1 tablet by mouth in the morning and 1 tablet before bedtime. 6/3/25   Judd Ji MD   methocarbamol (ROBAXIN) 750 mg tablet Take 2 tablets (1,500 mg total) by mouth every 8 (eight) hours  Patient taking differently: Take 1,500 mg by mouth every 6 (six) hours as needed for muscle spasms 5/13/25   Judd Ji MD   ondansetron (ZOFRAN-ODT) 8 mg disintegrating tablet Take 1 tablet (8 mg total) by mouth every 8 (eight) hours as needed for nausea or vomiting 6/11/25   Daryl Novak MD   pantoprazole (PROTONIX) 40 mg tablet Take 1 tablet (40 mg total) by mouth 2 (two) times a day before meals 6/17/25   Judd Ji MD   rivaroxaban (Xarelto Starter Pack) 15 & 20 MG starter pack Take 15 mg by mouth twice daily for 21 days, then 20 mg once daily thereafter.  Patient not taking: Reported on 6/30/2025 6/3/25   Matt Sorenson MD   traMADol (Ultram) 50 mg tablet Take 1 tablet (50 mg total) by mouth every 8 (eight) hours as needed for moderate pain and 2 pills at bedtime at least 6 hours after last dose of the day 6/9/25   Judd Ji MD   Ubrogepant (UBRELVY) 100 MG tablet Take 1 tablet (100 mg) one time as needed for migraine. May repeat one additional tablet (100 mg) at least two hours after the first dose. Do not use more than two doses per day, or for more than twelve days per month. 4/24/25   JAN Cruz     Allergies   Allergen Reactions    Cephalexin Hives     Skin peeling  Tolerates penicillins (tolerated unasyn and zosyn)    Metformin GI Intolerance    Pollen Extract Sneezing       Objective :  Temp:  [98 °F (36.7 °C)] 98 °F (36.7 °C)  HR:  [63-85] 67  BP: ()/(47-62) 104/58  Resp:  [12-28] 12  SpO2:  [90 %-99 %] 96 %  O2 Device: None (Room air)    Physical Exam  Constitutional:       General: He is not in acute distress.  HENT:      Head: Normocephalic and atraumatic.      Eyes:      Conjunctiva/sclera: Conjunctivae normal.       Cardiovascular:      Rate and Rhythm: Normal rate and regular rhythm.   Pulmonary:      Effort: No respiratory distress.      Breath sounds: No wheezing or rales.   Abdominal:      General: There is no distension.      Tenderness: There is no abdominal tenderness. There is no guarding.     Musculoskeletal:      Right lower leg: No edema.      Left lower leg: No edema.     Skin:     General: Skin is warm and dry.     Neurological:      Mental Status: He is oriented to person, place, and time.     Psychiatric:         Mood and Affect: Mood normal.          Lines/Drains:      Central Line:  Goal for removal: Port-A-Cath             Lab Results: I have reviewed the following results:  Results from last 7 days   Lab Units 07/01/25  1424 07/01/25  1223   WBC Thousand/uL  --  5.91   HEMOGLOBIN g/dL  --  13.8   HEMATOCRIT %  --  41.4   PLATELETS Thousands/uL 134* 190   SEGS PCT %  --  84*   LYMPHO PCT %  --  9*   MONO PCT %  --  3*   EOS PCT %  --  3     Results from last 7 days   Lab Units 07/01/25  1223   SODIUM mmol/L 127*   POTASSIUM mmol/L 4.8   CHLORIDE mmol/L 88*   CO2 mmol/L 25   BUN mg/dL 92*   CREATININE mg/dL 2.97*   ANION GAP mmol/L 14*   CALCIUM mg/dL 9.3   ALBUMIN g/dL 4.3   TOTAL BILIRUBIN mg/dL 0.92   ALK PHOS U/L 46   ALT U/L 10   AST U/L 9*   GLUCOSE RANDOM mg/dL 479*         Results from last 7 days   Lab Units 07/01/25  1204   POC GLUCOSE mg/dl 454*     Lab Results   Component Value Date    HGBA1C 10.4 (H) 04/06/2025    HGBA1C 8.8 (H) 02/21/2025    HGBA1C 6.4 10/30/2024           Imaging Results Review: I reviewed radiology reports from this admission including: CT chest.      Administrative Statements   I have spent a total time of 77 minutes in caring for this patient on the day of the visit/encounter including Diagnostic results, Prognosis, Documenting in the medical record, Reviewing/placing orders in the medical record (including tests,  medications, and/or procedures), Obtaining or reviewing history  , and Communicating with other healthcare professionals .    ** Please Note: This note has been constructed using a voice recognition system. **         [1]   Past Medical History:  Diagnosis Date    Allergic 1968    Seasonal    Arthritis 2012    Brain concussion Multiple    Cervical disc disease     Chronic kidney disease 2012    Chronic pain disorder     CTS (carpal tunnel syndrome) 2002    Diabetes mellitus (HCC)     Esophageal cancer (HCC)     Full dentures     only using upper    Head injury 1975    Headache(784.0) 1980    High blood sugar     Hypertension     Kidney stone     Liver disease     Low back pain     Cannot remember    Lumbar disc disease     Lumbosacral disc disease     Cannot remember    Migraines     Neuropathy in diabetes (HCC)     RLS (restless legs syndrome)     Skin cancer     in past    Thoracic disc disorder     Cannot remember    Visual impairment 2017    Cataracts. Surgery to correct in 2021   [2]   Past Surgical History:  Procedure Laterality Date    AMPUTATION  2022    Little toe left    CARPAL TUNNEL RELEASE  2002    CATARACT EXTRACTION Bilateral     COLONOSCOPY      CONTRACTURE Right 01/20/2025    Procedure: Right wrist and hand extensor tenolysis and wrist capsulotomy;  Surgeon: Carroll Mccarthy MD;  [facility];  Service: Orthopedics    EYE SURGERY  2021    FOOT SURGERY  2022    Left Foot    FRACTURE SURGERY  1968    Right Tib/Fib    GANGLION CYST EXCISION Left 03/25/2024    Procedure: EXCISION MUCOID CYST, INDEX FINGER DIP;  Surgeon: Carroll Mccarthy MD;  [facility];  Service: Orthopedics    GANGLION CYST EXCISION Right 05/20/2024    Procedure: EXCISION MUCOID CYST - RIGHT INDEX AND MIDDLE FINGERS;  Surgeon: Carroll Mccarthy MD;  [facility];  Service: Orthopedics    HERNIA REPAIR      INCISION AND DRAINAGE  01/16/2024    IR NEPHROURETERAL ACCESS FOR UROLOGY PCNL   04/07/2025    IR PORT PLACEMENT  6/16/2025    KIDNEY SURGERY Right 06/2012    KNEE SURGERY Right 1980    MOUTH SURGERY      MO AMPUTATION METATARSAL W/TOE SINGLE Left 6/1/2022    Procedure: RAY RESECTION FOOT;  Surgeon: Hannah Melgoza DPM;  [facility];  Service: Podiatry    MO INCISION & DRAINAGE ABSCESS COMPLICATED/MULTIPLE Right 09/17/2024    Procedure: Irrigation and debridement right wrist and hand, any indicated procedures;  Surgeon: Carroll Mccarthy MD;  [facility];  Service: Orthopedics    MO INCISION EXTENSOR TENDON SHEATH WRIST Right 01/20/2025    Procedure: RELEASE DEQUERVAINS - Right;  Surgeon: Carroll Mccarthy MD;  [facility];  Service: Orthopedics    MO PERQ NL/PL LITHOTRP COMPLEX >2 CM MLT LOCATIONS Right 04/11/2025    Procedure: NEPHROLITHOTOMY  PERCUTANEOUS (PCNL);  Surgeon: Adis Hamilton MD;  [facility];  Service: Urology    MO RPR AA HERNIA 1ST < 3 CM REDUCIBLE N/A 7/14/2023    Procedure: REPAIR HERNIA INCISIONAL;  Surgeon: Matt Melo MD;  [facility];  Service: General    MO SYNOVECTOMY EXTENSOR TENDON SHTH WRIST 1 CMPRT Right 10/03/2024    Procedure: Irrigation and debridement, right wrist, volar and dorsal, possible extensor and flexor tenosynovectomy, any indicated procedures;  Surgeon: Carroll Mccarthy MD;  [facility];  Service: Orthopedics    MO SYNOVECTOMY EXTENSOR TENDON SHTH WRIST 1 CMPRT Right 03/24/2025    Procedure: SYNOVECTOMY / DEBRIDEMENT - FLEXOR CARPI RADIALIS AND FLEXOR CARPI ULNARIS TENDONS- RIGHT;  Surgeon: Carroll Mccarthy MD;  [facility];  Service: Orthopedics    TONSILLECTOMY  1968    Yes    WOUND DEBRIDEMENT Left 4/28/2022    Procedure: Left foot washout;  Surgeon: Hannah Melgoza DPM;  [facility];  Service: Podiatry    WOUND DEBRIDEMENT Left 6/6/2022    Procedure: DEBRIDEMENT WOUND (WASH OUT);  Surgeon: Hannah Melgoza DPM;  [facility];  Service:  Podiatry   [3]   Social History  Tobacco Use    Smoking status: Never    Smokeless tobacco: Never   Vaping Use    Vaping status: Never Used   Substance and Sexual Activity    Alcohol use: Yes     Alcohol/week: 0.0 - 1.0 standard drinks of alcohol     Comment: ocassional    Drug use: Never    Sexual activity: Not Currently     Partners: Female     Birth control/protection: Abstinence

## 2025-07-01 NOTE — TELEPHONE ENCOUNTER
Patient calling to speak with Dr. Novak,  he hasn't able to keep anything down when he tries to eat, he is vomiting.  He had his chemo treatment last week.  He also fell 2x last night.  He also wants to know what ED should he go to, so if he's admitted he won't miss his infusion treatment.   Please call [phone]

## 2025-07-01 NOTE — NURSING NOTE
"Pt educated on need for starlight catheterization due to urinary retention. Pt verbalized understanding but refused the procedure, stating, \" I don't want it done right now. I don't need it. I used the bathroom before I left the house.\" Encouraged pt to reconsider and informed of possible complications of refusal. Abdomen soft non-distended and no complaints of pain or discomfort at this time. Provider made aware. Will continue to monitor urinary output and reassess as needed.   "

## 2025-07-02 PROBLEM — D61.818 PANCYTOPENIA (HCC): Status: ACTIVE | Noted: 2025-07-02

## 2025-07-02 PROBLEM — R33.9 URINARY RETENTION: Status: ACTIVE | Noted: 2025-07-02

## 2025-07-02 LAB
ALBUMIN SERPL BCG-MCNC: 3.6 G/DL (ref 3.5–5)
ALP SERPL-CCNC: 35 U/L (ref 34–104)
ALT SERPL W P-5'-P-CCNC: 7 U/L (ref 7–52)
ANION GAP SERPL CALCULATED.3IONS-SCNC: 12 MMOL/L (ref 4–13)
AST SERPL W P-5'-P-CCNC: 10 U/L (ref 13–39)
BASOPHILS # BLD AUTO: 0.01 THOUSANDS/ÂΜL (ref 0–0.1)
BASOPHILS NFR BLD AUTO: 0 % (ref 0–1)
BILIRUB SERPL-MCNC: 0.51 MG/DL (ref 0.2–1)
BUN SERPL-MCNC: 84 MG/DL (ref 5–25)
CALCIUM SERPL-MCNC: 7.8 MG/DL (ref 8.4–10.2)
CHLORIDE SERPL-SCNC: 100 MMOL/L (ref 96–108)
CO2 SERPL-SCNC: 21 MMOL/L (ref 21–32)
CREAT SERPL-MCNC: 2.61 MG/DL (ref 0.6–1.3)
EOSINOPHIL # BLD AUTO: 0.13 THOUSAND/ÂΜL (ref 0–0.61)
EOSINOPHIL NFR BLD AUTO: 5 % (ref 0–6)
ERYTHROCYTE [DISTWIDTH] IN BLOOD BY AUTOMATED COUNT: 12.1 % (ref 11.6–15.1)
GFR SERPL CREATININE-BSD FRML MDRD: 25 ML/MIN/1.73SQ M
GLUCOSE SERPL-MCNC: 134 MG/DL (ref 65–140)
GLUCOSE SERPL-MCNC: 140 MG/DL (ref 65–140)
GLUCOSE SERPL-MCNC: 150 MG/DL (ref 65–140)
GLUCOSE SERPL-MCNC: 152 MG/DL (ref 65–140)
GLUCOSE SERPL-MCNC: 168 MG/DL (ref 65–140)
GLUCOSE SERPL-MCNC: 186 MG/DL (ref 65–140)
HCT VFR BLD AUTO: 37.8 % (ref 36.5–49.3)
HGB BLD-MCNC: 11.9 G/DL (ref 12–17)
IMM GRANULOCYTES # BLD AUTO: 0.03 THOUSAND/UL (ref 0–0.2)
IMM GRANULOCYTES NFR BLD AUTO: 1 % (ref 0–2)
LYMPHOCYTES # BLD AUTO: 0.68 THOUSANDS/ÂΜL (ref 0.6–4.47)
LYMPHOCYTES NFR BLD AUTO: 24 % (ref 14–44)
MAGNESIUM SERPL-MCNC: 3.4 MG/DL (ref 1.9–2.7)
MCH RBC QN AUTO: 29 PG (ref 26.8–34.3)
MCHC RBC AUTO-ENTMCNC: 31.5 G/DL (ref 31.4–37.4)
MCV RBC AUTO: 92 FL (ref 82–98)
MONOCYTES # BLD AUTO: 0.14 THOUSAND/ÂΜL (ref 0.17–1.22)
MONOCYTES NFR BLD AUTO: 5 % (ref 4–12)
NEUTROPHILS # BLD AUTO: 1.9 THOUSANDS/ÂΜL (ref 1.85–7.62)
NEUTS SEG NFR BLD AUTO: 65 % (ref 43–75)
NRBC BLD AUTO-RTO: 0 /100 WBCS
PLATELET # BLD AUTO: 129 THOUSANDS/UL (ref 149–390)
PMV BLD AUTO: 11.1 FL (ref 8.9–12.7)
POTASSIUM SERPL-SCNC: 4.1 MMOL/L (ref 3.5–5.3)
PROT SERPL-MCNC: 6.3 G/DL (ref 6.4–8.4)
RBC # BLD AUTO: 4.1 MILLION/UL (ref 3.88–5.62)
SODIUM SERPL-SCNC: 133 MMOL/L (ref 135–147)
WBC # BLD AUTO: 2.89 THOUSAND/UL (ref 4.31–10.16)

## 2025-07-02 PROCEDURE — 87505 NFCT AGENT DETECTION GI: CPT | Performed by: FAMILY MEDICINE

## 2025-07-02 PROCEDURE — 80053 COMPREHEN METABOLIC PANEL: CPT | Performed by: FAMILY MEDICINE

## 2025-07-02 PROCEDURE — 99233 SBSQ HOSP IP/OBS HIGH 50: CPT | Performed by: FAMILY MEDICINE

## 2025-07-02 PROCEDURE — 83735 ASSAY OF MAGNESIUM: CPT | Performed by: FAMILY MEDICINE

## 2025-07-02 PROCEDURE — 85025 COMPLETE CBC W/AUTO DIFF WBC: CPT | Performed by: FAMILY MEDICINE

## 2025-07-02 PROCEDURE — 82948 REAGENT STRIP/BLOOD GLUCOSE: CPT

## 2025-07-02 RX ORDER — TRAMADOL HYDROCHLORIDE 50 MG/1
100 TABLET ORAL
Status: DISCONTINUED | OUTPATIENT
Start: 2025-07-02 | End: 2025-07-09 | Stop reason: HOSPADM

## 2025-07-02 RX ORDER — INSULIN GLARGINE 100 [IU]/ML
25 INJECTION, SOLUTION SUBCUTANEOUS EVERY MORNING
Status: DISCONTINUED | OUTPATIENT
Start: 2025-07-02 | End: 2025-07-02

## 2025-07-02 RX ORDER — HYDROCHLOROTHIAZIDE 12.5 MG/1
12.5 TABLET ORAL 2 TIMES DAILY
Status: DISCONTINUED | OUTPATIENT
Start: 2025-07-03 | End: 2025-07-09 | Stop reason: HOSPADM

## 2025-07-02 RX ORDER — INSULIN LISPRO 100 [IU]/ML
1-6 INJECTION, SOLUTION INTRAVENOUS; SUBCUTANEOUS
Status: DISCONTINUED | OUTPATIENT
Start: 2025-07-02 | End: 2025-07-09 | Stop reason: HOSPADM

## 2025-07-02 RX ORDER — INSULIN GLARGINE 100 [IU]/ML
25 INJECTION, SOLUTION SUBCUTANEOUS
Status: DISCONTINUED | OUTPATIENT
Start: 2025-07-02 | End: 2025-07-05

## 2025-07-02 RX ORDER — INSULIN LISPRO 100 [IU]/ML
1-5 INJECTION, SOLUTION INTRAVENOUS; SUBCUTANEOUS
Status: DISCONTINUED | OUTPATIENT
Start: 2025-07-02 | End: 2025-07-09 | Stop reason: HOSPADM

## 2025-07-02 RX ORDER — INSULIN LISPRO 100 [IU]/ML
8 INJECTION, SOLUTION INTRAVENOUS; SUBCUTANEOUS
Status: DISCONTINUED | OUTPATIENT
Start: 2025-07-02 | End: 2025-07-04

## 2025-07-02 RX ORDER — LOPERAMIDE HYDROCHLORIDE 2 MG/1
2 CAPSULE ORAL 4 TIMES DAILY PRN
Status: DISCONTINUED | OUTPATIENT
Start: 2025-07-02 | End: 2025-07-09 | Stop reason: HOSPADM

## 2025-07-02 RX ORDER — INSULIN LISPRO 100 [IU]/ML
6 INJECTION, SOLUTION INTRAVENOUS; SUBCUTANEOUS
Status: DISCONTINUED | OUTPATIENT
Start: 2025-07-02 | End: 2025-07-02

## 2025-07-02 RX ADMIN — INSULIN LISPRO 8 UNITS: 100 INJECTION, SOLUTION INTRAVENOUS; SUBCUTANEOUS at 08:03

## 2025-07-02 RX ADMIN — LOPERAMIDE HYDROCHLORIDE 2 MG: 2 CAPSULE ORAL at 15:19

## 2025-07-02 RX ADMIN — SODIUM CHLORIDE 100 ML/HR: 0.9 INJECTION, SOLUTION INTRAVENOUS at 14:50

## 2025-07-02 RX ADMIN — AMITRIPTYLINE HYDROCHLORIDE 30 MG: 10 TABLET, FILM COATED ORAL at 21:42

## 2025-07-02 RX ADMIN — PANTOPRAZOLE SODIUM 40 MG: 40 INJECTION, POWDER, FOR SOLUTION INTRAVENOUS at 08:00

## 2025-07-02 RX ADMIN — INSULIN LISPRO 1 UNITS: 100 INJECTION, SOLUTION INTRAVENOUS; SUBCUTANEOUS at 21:38

## 2025-07-02 RX ADMIN — INSULIN LISPRO 1 UNITS: 100 INJECTION, SOLUTION INTRAVENOUS; SUBCUTANEOUS at 16:56

## 2025-07-02 RX ADMIN — PANTOPRAZOLE SODIUM 40 MG: 40 INJECTION, POWDER, FOR SOLUTION INTRAVENOUS at 21:28

## 2025-07-02 RX ADMIN — TRAMADOL HYDROCHLORIDE 100 MG: 50 TABLET, COATED ORAL at 21:28

## 2025-07-02 RX ADMIN — LIDOCAINE 5% 1 PATCH: 700 PATCH TOPICAL at 08:01

## 2025-07-02 RX ADMIN — INSULIN LISPRO 8 UNITS: 100 INJECTION, SOLUTION INTRAVENOUS; SUBCUTANEOUS at 16:56

## 2025-07-02 RX ADMIN — SODIUM CHLORIDE 125 ML/HR: 0.9 INJECTION, SOLUTION INTRAVENOUS at 08:02

## 2025-07-02 RX ADMIN — INSULIN GLARGINE 25 UNITS: 100 INJECTION, SOLUTION SUBCUTANEOUS at 21:36

## 2025-07-02 RX ADMIN — INSULIN LISPRO 8 UNITS: 100 INJECTION, SOLUTION INTRAVENOUS; SUBCUTANEOUS at 12:01

## 2025-07-02 RX ADMIN — ONDANSETRON 4 MG: 2 INJECTION INTRAMUSCULAR; INTRAVENOUS at 15:19

## 2025-07-02 NOTE — ASSESSMENT & PLAN NOTE
Lab Results   Component Value Date    HGBA1C 10.4 (H) 04/06/2025       Recent Labs     07/01/25  2231 07/02/25  0154 07/02/25  0604 07/02/25  1107   POCGLU 91 134 150* 140       Blood Sugar Average: Last 72 hrs:  (P) 242    Uncontrolled in setting of recent steroid therapy  Patient follows with endocrinology  Currently prescribed Lantus 20 units, Humalog 8 units 3 times daily, Jardiance - increased Lantus to 25 units due to hyperglycemia  Patient presents with VIVIANA, p.o. intolerance with elevated beta hydroxybutyrate, mildly increased anion gap, however normal carbon bicarbonate, therefore do not suspect DKA  Initially marked hyperglycemia requiring insulin drip, now transitioned back to subcutaneous insulin as above

## 2025-07-02 NOTE — NURSING NOTE
"Pt educated on need for straight catheterization due to urinary retention. Pt verbalized understanding but refused procedure stating, \"I don't want it done right now. I don't need it. I used the bathroom before I left the house.\" Encouraged pt to reconsider and informed of possible complications of refusal. Abdomen soft non-distended with no complaints of pain or discomfort at this time. Provider made aware. Will continue to monitor urinary output and reassess as needed.   "

## 2025-07-02 NOTE — ASSESSMENT & PLAN NOTE
This is a 60-year-old male patient with history of recently diagnosed esophageal cancer in May 2025, diabetes, recently diagnosed acute DVT, hypertension who started chemotherapy 6/26 and presents with p.o. intolerance, nausea, vomiting and diarrhea.  Found to have acute kidney injury with creatinine of 2.97 with baseline creatinine 1.4-1.6  Initially associated with hypotension responded to 2 L normal saline boluses  VIVIANA secondary to prerenal causes due to GI losses in setting of chemotherapy, urinary retention may be contributing  Mildly improved  Urine studies reviewed  Patient noted for urinary retention but is declining catheterization, continue to monitor  Continue IV hydration  Monitor BMP, I's and O's closely      Lab Results   Component Value Date    EGFR 25 07/02/2025    EGFR 22 07/01/2025    EGFR 21 07/01/2025    CREATININE 2.61 (H) 07/02/2025    CREATININE 2.87 (H) 07/01/2025    CREATININE 2.97 (H) 07/01/2025

## 2025-07-02 NOTE — NURSING NOTE
"Pt educated on need for straight catheterization due to urinary retention. Pt verbalized understanding but refused the procedure, stating, \"No.\" Encouraged pt to reconsider and informed of possible complications of refusal. Abdomen soft non - distended with no complaints offered at  this time. Will continue to monitor urinary output and reassess as needed.  Provider made aware.   "

## 2025-07-02 NOTE — ASSESSMENT & PLAN NOTE
Lab Results   Component Value Date    EGFR 25 07/02/2025    EGFR 22 07/01/2025    EGFR 21 07/01/2025    CREATININE 2.61 (H) 07/02/2025    CREATININE 2.87 (H) 07/01/2025    CREATININE 2.97 (H) 07/01/2025   Baseline creatinine 1.4-1.6, presents with severe VIVIANA with creatinine of 2.97  Please refer to plan under VIVIANA

## 2025-07-02 NOTE — QUICK NOTE
Notified patient with blood glucose 91.  Chart reviewed, was started on insulin infusion for hyperglycemia.  Glucose noted downtrending since start of insulin drip.  Will transition back to Lantus 20 units, Humalog 8 units 3 times daily.  Fingerstick glucose 0200 ordered.

## 2025-07-02 NOTE — NURSING NOTE
"Pt. bladder scanned for 696 cc. This nurse attempted to get pt. to try and void. Pt. informed this nurse that he doesn't have to void. Pt. denies any discomfort at this time. Pt. educated on the possibility of straight cath. Pt. refused to be straight cath. Educated on possible complications with urinary retention. Pt. verbalized understanding and stated, \" I will walk around after breakfast and than try and go.\" On call provider informed. Will continue to monitor.   "

## 2025-07-02 NOTE — PLAN OF CARE
Problem: Potential for Falls  Goal: Patient will remain free of falls  Description: INTERVENTIONS:  - Educate patient/family on patient safety including physical limitations  - Instruct patient to call for assistance with activity   - Consider consulting OT/PT to assist with strengthening/mobility based on AM PAC & JH-HLM score  - Consult OT/PT to assist with strengthening/mobility   - Keep Call bell within reach  - Keep bed low and locked with side rails adjusted as appropriate  - Keep care items and personal belongings within reach  - Initiate and maintain comfort rounds  - Make Fall Risk Sign visible to staff  - Apply yellow socks and bracelet for high fall risk patients  - Consider moving patient to room near nurses station  Outcome: Progressing     Problem: PAIN - ADULT  Goal: Verbalizes/displays adequate comfort level or baseline comfort level  Description: Interventions:  - Encourage patient to monitor pain and request assistance  - Assess pain using appropriate pain scale  - Administer analgesics as ordered based on type and severity of pain and evaluate response  - Implement non-pharmacological measures as appropriate and evaluate response  - Consider cultural and social influences on pain and pain management  - Notify physician/advanced practitioner if interventions unsuccessful or patient reports new pain  - Educate patient/family on pain management process including their role and importance of  reporting pain   - Provide non-pharmacologic/complimentary pain relief interventions  Outcome: Progressing     Problem: METABOLIC, FLUID AND ELECTROLYTES - ADULT  Goal: Electrolytes maintained within normal limits  Description: INTERVENTIONS:  - Monitor labs and assess patient for signs and symptoms of electrolyte imbalances  - Administer electrolyte replacement as ordered  - Monitor response to electrolyte replacements, including repeat lab results as appropriate  - Instruct patient on fluid and nutrition as  appropriate  Outcome: Progressing     Problem: METABOLIC, FLUID AND ELECTROLYTES - ADULT  Goal: Fluid balance maintained  Description: INTERVENTIONS:  - Monitor labs   - Monitor I/O and WT  - Instruct patient on fluid and nutrition as appropriate  - Assess for signs & symptoms of volume excess or deficit  Outcome: Progressing     Problem: METABOLIC, FLUID AND ELECTROLYTES - ADULT  Goal: Glucose maintained within target range  Description: INTERVENTIONS:  - Monitor Blood Glucose as ordered  - Assess for signs and symptoms of hyperglycemia and hypoglycemia  - Administer ordered medications to maintain glucose within target range  - Assess nutritional intake and initiate nutrition service referral as needed  Outcome: Progressing

## 2025-07-02 NOTE — ASSESSMENT & PLAN NOTE
In setting of hypercoagulability of malignancy  Prescribed Xarelto for 6 months therapy, however has not been taking  Heparin for DVT prophylaxis now

## 2025-07-02 NOTE — ASSESSMENT & PLAN NOTE
Associated with VIVIANA  Patient has declined catheterization  Continue to monitor PVR, encourage ambulation  Monitor BMP  Consider in versus short-term outpatient urology evaluation

## 2025-07-02 NOTE — PROGRESS NOTES
Progress Note - Hospitalist   Name: Yoni Castillo 60 y.o. male I MRN: [id number]  Unit/Bed#: 7T [facility] 706-01 I Date of Admission: 7/1/2025   Date of Service: 7/2/2025 I Hospital Day: 1    Assessment & Plan  Acute kidney injury superimposed on stage 3a chronic kidney disease (HCC)  This is a 60-year-old male patient with history of recently diagnosed esophageal cancer in May 2025, diabetes, recently diagnosed acute DVT, hypertension who started chemotherapy 6/26 and presents with p.o. intolerance, nausea, vomiting and diarrhea.  Found to have acute kidney injury with creatinine of 2.97 with baseline creatinine 1.4-1.6  Initially associated with hypotension responded to 2 L normal saline boluses  VIVIANA secondary to prerenal causes due to GI losses in setting of chemotherapy, urinary retention may be contributing  Mildly improved  Urine studies reviewed  Patient noted for urinary retention but is declining catheterization, continue to monitor  Continue IV hydration  Monitor BMP, I's and O's closely      Lab Results   Component Value Date    EGFR 25 07/02/2025    EGFR 22 07/01/2025    EGFR 21 07/01/2025    CREATININE 2.61 (H) 07/02/2025    CREATININE 2.87 (H) 07/01/2025    CREATININE 2.97 (H) 07/01/2025     Essential hypertension  Patient presents with volume depletion, VIVIANA, hyponatremia  Hold lisinopril, HCTZ  Continue carvedilol at decreased dose for now, with holding parameters  Type 2 diabetes mellitus with chronic kidney disease, without long-term current use of insulin ([facility])  Lab Results   Component Value Date    HGBA1C 10.4 (H) 04/06/2025       Recent Labs     07/01/25  2231 07/02/25  0154 07/02/25  0604 07/02/25  1107   POCGLU 91 134 150* 140       Blood Sugar Average: Last 72 hrs:  (P) 242    Uncontrolled in setting of recent steroid therapy  Patient follows with endocrinology  Currently prescribed Lantus 20 units, Humalog 8 units 3 times daily, Jardiance - increased Lantus to 25 units due to hyperglycemia  Patient  presents with VIVIANA, p.o. intolerance with elevated beta hydroxybutyrate, mildly increased anion gap, however normal carbon bicarbonate, therefore do not suspect DKA  Initially marked hyperglycemia requiring insulin drip, now transitioned back to subcutaneous insulin as above  Chronic kidney disease, stage 3 (HCC)  Lab Results   Component Value Date    EGFR 25 07/02/2025    EGFR 22 07/01/2025    EGFR 21 07/01/2025    CREATININE 2.61 (H) 07/02/2025    CREATININE 2.87 (H) 07/01/2025    CREATININE 2.97 (H) 07/01/2025   Baseline creatinine 1.4-1.6, presents with severe VIVIANA with creatinine of 2.97  Please refer to plan under VIVIANA  Opioid dependence, uncomplicated (HCC)  Maintained on tramadol  Proceed with oxycodone while here due to VIVIANA  Hyponatremia  Hypovolemic as well as pseudohyponatremia in setting of hyperglycemia  Sodium corrects to 133  Proceed with IV fluids, monitor BMP every 4 hours for now  Malignant neoplasm of lower third of esophagus (HCC)  Follows with heme-onc Dr. Novak  On chemotherapy with fluorouracil, leucovorin, oxaliplatin, docetaxel every 14 days, completed 6/26, next cycle scheduled for 7/9    Deep vein thrombosis (DVT) of femoral vein of left lower extremity (HCC)  In setting of hypercoagulability of malignancy  Prescribed Xarelto for 6 months therapy, however has not been taking  Heparin for DVT prophylaxis now  Urinary retention  Associated with VIVIANA  Patient has declined catheterization  Continue to monitor PVR, encourage ambulation  Monitor BMP  Consider in versus short-term outpatient urology evaluation  Pancytopenia (HCC)  Mild  Likely chemotherapy induced  Monitor CBC    VTE Pharmacologic Prophylaxis: VTE Score: 5 High Risk (Score >/= 5) - Pharmacological DVT Prophylaxis Ordered: heparin. Sequential Compression Devices Ordered.    Mobility:   Basic Mobility Inpatient Raw Score: 24  JH-HLM Goal: 8: Walk 250 feet or more  JH-HLM Achieved: 8: Walk 250 feet ot more  JH-HLM Goal achieved.  Continue to encourage appropriate mobility.    Patient Centered Rounds: I performed bedside rounds with nursing staff today.   Discussions with Specialists or Other Care Team Provider: CM    Education and Discussions with Family / Patient: patient    Current Length of Stay: 1 day(s)  Current Patient Status: Inpatient   Certification Statement: The patient will continue to require additional inpatient hospital stay due to need for IV Rx, close monitoring  Discharge Plan: Anticipate discharge in 48 hrs to home.    Code Status: Level 1 - Full Code    Subjective   Patient reports feeling much improved since yesterday. Tolerating diet. Noted for urinary retention.    Objective :  Temp:  [96.5 °F (35.8 °C)-98 °F (36.7 °C)] 97.5 °F (36.4 °C)  HR:  [63-85] 75  BP: ()/(47-79) 120/79  Resp:  [12-28] 18  SpO2:  [90 %-99 %] 96 %  O2 Device: None (Room air)    Body mass index is 36.24 kg/m².     Input and Output Summary (last 24 hours):     Intake/Output Summary (Last 24 hours) at 7/2/2025 1153  Last data filed at 7/2/2025 0815  Gross per 24 hour   Intake 2722 ml   Output 450 ml   Net 2272 ml       Physical Exam  Constitutional:       General: He is not in acute distress.  HENT:      Head: Normocephalic and atraumatic.     Eyes:      Conjunctiva/sclera: Conjunctivae normal.       Cardiovascular:      Rate and Rhythm: Normal rate and regular rhythm.   Pulmonary:      Effort: No respiratory distress.      Breath sounds: No wheezing or rales.   Abdominal:      General: There is no distension.      Tenderness: There is no abdominal tenderness. There is no guarding.     Musculoskeletal:      Right lower leg: No edema.      Left lower leg: No edema.     Skin:     General: Skin is warm and dry.     Neurological:      Mental Status: He is oriented to person, place, and time.     Psychiatric:         Mood and Affect: Mood normal.         Lines/Drains:      Central Line:  Goal for removal: Port               Lab Results: I have  reviewed the following results:   Results from last 7 days   Lab Units 07/02/25  0457   WBC Thousand/uL 2.89*   HEMOGLOBIN g/dL 11.9*   HEMATOCRIT % 37.8   PLATELETS Thousands/uL 129*   SEGS PCT % 65   LYMPHO PCT % 24   MONO PCT % 5   EOS PCT % 5     Results from last 7 days   Lab Units 07/02/25  0457   SODIUM mmol/L 133*   POTASSIUM mmol/L 4.1   CHLORIDE mmol/L 100   CO2 mmol/L 21   BUN mg/dL 84*   CREATININE mg/dL 2.61*   ANION GAP mmol/L 12   CALCIUM mg/dL 7.8*   ALBUMIN g/dL 3.6   TOTAL BILIRUBIN mg/dL 0.51   ALK PHOS U/L 35   ALT U/L 7   AST U/L 10*   GLUCOSE RANDOM mg/dL 152*         Results from last 7 days   Lab Units 07/02/25  1107 07/02/25  0604 07/02/25  0154 07/01/25  2231 07/01/25  2031 07/01/25  1829 07/01/25  1556 07/01/25  1523 07/01/25  1204   POC GLUCOSE mg/dl 140 150* 134 91 116 274* 421* 398* 454*               Recent Cultures (last 7 days):               Last 24 Hours Medication List:     Current Facility-Administered Medications:     acetaminophen (TYLENOL) tablet 650 mg, Q6H PRN    amitriptyline (ELAVIL) tablet 30 mg, HS    ammonium lactate (LAC-HYDRIN) 12 % lotion, BID PRN    carvedilol (COREG) tablet 12.5 mg, BID With Meals    [Held by provider] Empagliflozin TABS 25 mg, Daily    insulin glargine (LANTUS) subcutaneous injection 25 Units 0.25 mL, HS    insulin lispro (HumALOG/ADMELOG) 100 units/mL subcutaneous injection 1-5 Units, HS    insulin lispro (HumALOG/ADMELOG) 100 units/mL subcutaneous injection 1-6 Units, TID AC **AND** Fingerstick Glucose (POCT), TID AC    insulin lispro (HumALOG/ADMELOG) 100 units/mL subcutaneous injection 8 Units, TID With Meals    lidocaine (LIDODERM) 5 % patch 1 patch, Daily    methocarbamol (ROBAXIN) tablet 1,500 mg, Q6H PRN    ondansetron (ZOFRAN) injection 4 mg, Q6H PRN    oxyCODONE (ROXICODONE) IR tablet 5 mg, Q4H PRN **OR** oxyCODONE (ROXICODONE) split tablet 7.5 mg, Q4H PRN    pantoprazole (PROTONIX) injection 40 mg, Q12H WILTON    rimegepant sulfate  (NURTEC) disintegrating tablet 75 mg, Daily PRN    sodium chloride 0.9 % infusion, Continuous, Last Rate: 125 mL/hr (07/02/25 0802)    Administrative Statements   Today, Patient Was Seen By: Farnaz Loredo MD  I have spent a total time of 52 minutes in caring for this patient on the day of the visit/encounter including Diagnostic results, Counseling / Coordination of care, Documenting in the medical record, Reviewing/placing orders in the medical record (including tests, medications, and/or procedures), and Communicating with other healthcare professionals .    **Please Note: This note may have been constructed using a voice recognition system.**

## 2025-07-02 NOTE — TREATMENT PLAN
Patient is requesting that his HCTZ is restarted.  I explained to him that the medication is being held due to acute kidney injury and hyponatremia.  The patient is adamant that he needs this medication.  We agreed to resume tomorrow, 7/3/25.    Patient is also requesting that his tramadol 100 mg nightly is resumed.  I also explained that it is potential nephrotoxicity but he states that this is the only medication that helps for his restless leg syndrome and prefers that it is ordered despite nephrotoxic risks.  Ordered for tonight.

## 2025-07-03 LAB
ALBUMIN SERPL BCG-MCNC: 3.4 G/DL (ref 3.5–5)
ALP SERPL-CCNC: 36 U/L (ref 34–104)
ALT SERPL W P-5'-P-CCNC: 7 U/L (ref 7–52)
ANION GAP SERPL CALCULATED.3IONS-SCNC: 9 MMOL/L (ref 4–13)
AST SERPL W P-5'-P-CCNC: 8 U/L (ref 13–39)
BASOPHILS # BLD MANUAL: 0 THOUSAND/UL (ref 0–0.1)
BASOPHILS NFR MAR MANUAL: 0 % (ref 0–1)
BILIRUB SERPL-MCNC: 0.45 MG/DL (ref 0.2–1)
BUN SERPL-MCNC: 65 MG/DL (ref 5–25)
C COLI+JEJUNI TUF STL QL NAA+PROBE: NEGATIVE
C DIFF TOX GENS STL QL NAA+PROBE: NEGATIVE
CALCIUM ALBUM COR SERPL-MCNC: 8.2 MG/DL (ref 8.3–10.1)
CALCIUM SERPL-MCNC: 7.7 MG/DL (ref 8.4–10.2)
CHLORIDE SERPL-SCNC: 104 MMOL/L (ref 96–108)
CO2 SERPL-SCNC: 20 MMOL/L (ref 21–32)
CREAT SERPL-MCNC: 1.94 MG/DL (ref 0.6–1.3)
EC STX1+STX2 GENES STL QL NAA+PROBE: NEGATIVE
EOSINOPHIL # BLD MANUAL: 0.08 THOUSAND/UL (ref 0–0.4)
EOSINOPHIL NFR BLD MANUAL: 4 % (ref 0–6)
ERYTHROCYTE [DISTWIDTH] IN BLOOD BY AUTOMATED COUNT: 12.1 % (ref 11.6–15.1)
GFR SERPL CREATININE-BSD FRML MDRD: 36 ML/MIN/1.73SQ M
GLUCOSE SERPL-MCNC: 175 MG/DL (ref 65–140)
GLUCOSE SERPL-MCNC: 183 MG/DL (ref 65–140)
GLUCOSE SERPL-MCNC: 185 MG/DL (ref 65–140)
GLUCOSE SERPL-MCNC: 206 MG/DL (ref 65–140)
GLUCOSE SERPL-MCNC: 248 MG/DL (ref 65–140)
HCT VFR BLD AUTO: 36.9 % (ref 36.5–49.3)
HGB BLD-MCNC: 11.8 G/DL (ref 12–17)
LG PLATELETS BLD QL SMEAR: PRESENT
LYMPHOCYTES # BLD AUTO: 0.59 THOUSAND/UL (ref 0.6–4.47)
LYMPHOCYTES # BLD AUTO: 23 % (ref 14–44)
MCH RBC QN AUTO: 29.3 PG (ref 26.8–34.3)
MCHC RBC AUTO-ENTMCNC: 32 G/DL (ref 31.4–37.4)
MCV RBC AUTO: 92 FL (ref 82–98)
MONOCYTES # BLD AUTO: 0.1 THOUSAND/UL (ref 0–1.22)
MONOCYTES NFR BLD: 5 % (ref 4–12)
MYELOCYTE ABSOLUTE CT: 0.02 THOUSAND/UL (ref 0–0.1)
MYELOCYTES NFR BLD MANUAL: 1 % (ref 0–1)
NEUTROPHILS # BLD MANUAL: 1.24 THOUSAND/UL (ref 1.85–7.62)
NEUTS SEG NFR BLD AUTO: 61 % (ref 43–75)
PLATELET # BLD AUTO: 124 THOUSANDS/UL (ref 149–390)
PLATELET BLD QL SMEAR: ABNORMAL
PMV BLD AUTO: 10.8 FL (ref 8.9–12.7)
POTASSIUM SERPL-SCNC: 4.4 MMOL/L (ref 3.5–5.3)
PROT SERPL-MCNC: 6 G/DL (ref 6.4–8.4)
RBC # BLD AUTO: 4.03 MILLION/UL (ref 3.88–5.62)
RBC MORPH BLD: PRESENT
SALMONELLA SP SPAO STL QL NAA+PROBE: NEGATIVE
SHIGELLA SP+EIEC IPAH STL QL NAA+PROBE: NEGATIVE
SODIUM SERPL-SCNC: 133 MMOL/L (ref 135–147)
VARIANT LYMPHS # BLD AUTO: 6 %
WBC # BLD AUTO: 2.04 THOUSAND/UL (ref 4.31–10.16)

## 2025-07-03 PROCEDURE — 85027 COMPLETE CBC AUTOMATED: CPT | Performed by: FAMILY MEDICINE

## 2025-07-03 PROCEDURE — 80053 COMPREHEN METABOLIC PANEL: CPT | Performed by: FAMILY MEDICINE

## 2025-07-03 PROCEDURE — 85007 BL SMEAR W/DIFF WBC COUNT: CPT | Performed by: FAMILY MEDICINE

## 2025-07-03 PROCEDURE — 99232 SBSQ HOSP IP/OBS MODERATE 35: CPT | Performed by: FAMILY MEDICINE

## 2025-07-03 PROCEDURE — 82948 REAGENT STRIP/BLOOD GLUCOSE: CPT

## 2025-07-03 RX ORDER — PANTOPRAZOLE SODIUM 40 MG/1
40 TABLET, DELAYED RELEASE ORAL
Status: DISCONTINUED | OUTPATIENT
Start: 2025-07-03 | End: 2025-07-09 | Stop reason: HOSPADM

## 2025-07-03 RX ORDER — DICYCLOMINE HYDROCHLORIDE 10 MG/1
10 CAPSULE ORAL 4 TIMES DAILY PRN
Status: DISCONTINUED | OUTPATIENT
Start: 2025-07-03 | End: 2025-07-04

## 2025-07-03 RX ADMIN — LOPERAMIDE HYDROCHLORIDE 2 MG: 2 CAPSULE ORAL at 08:22

## 2025-07-03 RX ADMIN — RIVAROXABAN 15 MG: 10 TABLET, FILM COATED ORAL at 08:22

## 2025-07-03 RX ADMIN — INSULIN LISPRO 8 UNITS: 100 INJECTION, SOLUTION INTRAVENOUS; SUBCUTANEOUS at 08:20

## 2025-07-03 RX ADMIN — ONDANSETRON 4 MG: 2 INJECTION INTRAMUSCULAR; INTRAVENOUS at 21:42

## 2025-07-03 RX ADMIN — INSULIN LISPRO 1 UNITS: 100 INJECTION, SOLUTION INTRAVENOUS; SUBCUTANEOUS at 21:34

## 2025-07-03 RX ADMIN — LOPERAMIDE HYDROCHLORIDE 2 MG: 2 CAPSULE ORAL at 16:24

## 2025-07-03 RX ADMIN — ONDANSETRON 4 MG: 2 INJECTION INTRAMUSCULAR; INTRAVENOUS at 15:10

## 2025-07-03 RX ADMIN — INSULIN LISPRO 3 UNITS: 100 INJECTION, SOLUTION INTRAVENOUS; SUBCUTANEOUS at 11:34

## 2025-07-03 RX ADMIN — INSULIN LISPRO 1 UNITS: 100 INJECTION, SOLUTION INTRAVENOUS; SUBCUTANEOUS at 06:00

## 2025-07-03 RX ADMIN — METHOCARBAMOL TABLETS 1500 MG: 750 TABLET, COATED ORAL at 08:21

## 2025-07-03 RX ADMIN — INSULIN GLARGINE 25 UNITS: 100 INJECTION, SOLUTION SUBCUTANEOUS at 21:34

## 2025-07-03 RX ADMIN — TRAMADOL HYDROCHLORIDE 100 MG: 50 TABLET, COATED ORAL at 22:26

## 2025-07-03 RX ADMIN — AMITRIPTYLINE HYDROCHLORIDE 30 MG: 10 TABLET, FILM COATED ORAL at 22:26

## 2025-07-03 RX ADMIN — INSULIN LISPRO 8 UNITS: 100 INJECTION, SOLUTION INTRAVENOUS; SUBCUTANEOUS at 11:35

## 2025-07-03 RX ADMIN — DICYCLOMINE HYDROCHLORIDE 10 MG: 10 CAPSULE ORAL at 20:34

## 2025-07-03 RX ADMIN — INSULIN LISPRO 1 UNITS: 100 INJECTION, SOLUTION INTRAVENOUS; SUBCUTANEOUS at 16:25

## 2025-07-03 RX ADMIN — INSULIN LISPRO 8 UNITS: 100 INJECTION, SOLUTION INTRAVENOUS; SUBCUTANEOUS at 16:25

## 2025-07-03 RX ADMIN — SODIUM CHLORIDE 100 ML/HR: 0.9 INJECTION, SOLUTION INTRAVENOUS at 00:12

## 2025-07-03 RX ADMIN — RIMEGEPANT SULFATE 75 MG: 75 TABLET, ORALLY DISINTEGRATING ORAL at 08:21

## 2025-07-03 RX ADMIN — HYDROCHLOROTHIAZIDE 12.5 MG: 12.5 TABLET ORAL at 17:01

## 2025-07-03 RX ADMIN — LIDOCAINE 5% 1 PATCH: 700 PATCH TOPICAL at 08:20

## 2025-07-03 RX ADMIN — PANTOPRAZOLE SODIUM 40 MG: 40 INJECTION, POWDER, FOR SOLUTION INTRAVENOUS at 08:20

## 2025-07-03 RX ADMIN — PANTOPRAZOLE SODIUM 40 MG: 40 TABLET, DELAYED RELEASE ORAL at 15:10

## 2025-07-03 NOTE — NURSING NOTE
Bladder scan performed with a result of 807 mL. Patient was informed of findings and the recommendation for straight catheterization to relieve urinary retention. Patient verbalized understanding of risks and benefits but refused the procedure at this time. Will continue to monitor and reassess as needed. Provider notified.

## 2025-07-03 NOTE — ASSESSMENT & PLAN NOTE
Lab Results   Component Value Date    EGFR 36 07/03/2025    EGFR 25 07/02/2025    EGFR 22 07/01/2025    CREATININE 1.94 (H) 07/03/2025    CREATININE 2.61 (H) 07/02/2025    CREATININE 2.87 (H) 07/01/2025   Baseline creatinine 1.4-1.6, presents with severe VIVIANA with creatinine of 2.97  Please refer to plan under VIVIANA

## 2025-07-03 NOTE — PROGRESS NOTES
Progress Note - Hospitalist   Name: Yoni Castillo 60 y.o. male I MRN: [id number]  Unit/Bed#: 7T [facility] 706-01 I Date of Admission: 7/1/2025   Date of Service: 7/3/2025 I Hospital Day: 2    Assessment & Plan  Acute kidney injury superimposed on stage 3a chronic kidney disease (HCC)  This is a 60-year-old male patient with history of recently diagnosed esophageal cancer in May 2025, diabetes, recently diagnosed acute DVT, hypertension who started chemotherapy 6/26 and presents with p.o. intolerance, nausea, vomiting and diarrhea.  Found to have acute kidney injury with creatinine of 2.97 with baseline creatinine 1.4-1.6  Initially associated with hypotension responded to 2 L normal saline boluses  VIVIANA secondary to prerenal causes due to GI losses in setting of chemotherapy, urinary retention may be contributing  Continues to improve, down to 1.94 today  Urine studies reviewed  Patient noted for urinary retention but is declining catheterization, continue to monitor  Monitor off IVF as patient reports developing LE edema  Monitor BMP, I's and O's closely      Lab Results   Component Value Date    EGFR 36 07/03/2025    EGFR 25 07/02/2025    EGFR 22 07/01/2025    CREATININE 1.94 (H) 07/03/2025    CREATININE 2.61 (H) 07/02/2025    CREATININE 2.87 (H) 07/01/2025     Essential hypertension  Patient presents with volume depletion, VIVIANA, hyponatremia  Hold lisinopril  HCTZ resumed per patient's request, with holding parameters  Continue carvedilol at decreased dose for now, with holding parameters  Type 2 diabetes mellitus with chronic kidney disease, without long-term current use of insulin ([facility])  Lab Results   Component Value Date    HGBA1C 10.4 (H) 04/06/2025       Recent Labs     07/02/25  2037 07/03/25  0549 07/03/25  1104 07/03/25  1505   POCGLU 186* 183* 248* 175*       Blood Sugar Average: Last 72 hrs:  (P) 224.6538293568605173    Uncontrolled in setting of recent steroid therapy  Patient follows with  endocrinology  Currently prescribed Lantus 20 units, Humalog 8 units 3 times daily, Jardiance - increased Lantus to 25 units due to hyperglycemia  Patient presents with VIVIANA, p.o. intolerance with elevated beta hydroxybutyrate, mildly increased anion gap, however normal carbon bicarbonate, therefore do not suspect DKA  Initially marked hyperglycemia requiring insulin drip, now transitioned back to subcutaneous insulin as above  Chronic kidney disease, stage 3 (HCC)  Lab Results   Component Value Date    EGFR 36 07/03/2025    EGFR 25 07/02/2025    EGFR 22 07/01/2025    CREATININE 1.94 (H) 07/03/2025    CREATININE 2.61 (H) 07/02/2025    CREATININE 2.87 (H) 07/01/2025   Baseline creatinine 1.4-1.6, presents with severe VIVIANA with creatinine of 2.97  Please refer to plan under VIVIANA  Opioid dependence, uncomplicated (HCC)  Maintained on tramadol -was held due to VIVIANA, however patient requests that it is resumed, 100 mg at bedtime  Discontinue oxycodone  Hyponatremia  Hypovolemic as well as pseudohyponatremia in setting of hyperglycemia  Sodium corrects to 133  Stable   Malignant neoplasm of lower third of esophagus (HCC)  Follows with heme-onc Dr. Novak  On chemotherapy with fluorouracil, leucovorin, oxaliplatin, docetaxel every 14 days, completed 6/26, next cycle scheduled for 7/9    Deep vein thrombosis (DVT) of femoral vein of left lower extremity (HCC)  In setting of hypercoagulability of malignancy  Prescribed Xarelto for 6 months therapy, however has not been taking due to misunderstanding  Instructed to resume, resumed while here, renally dosed    Urinary retention  Associated with VIVIANA  Patient has declined catheterization  Continue to monitor PVR, encourage ambulation  Monitor BMP  Consider in versus short-term outpatient urology evaluation  Pancytopenia (HCC)  Mild  WBC trending down, continue to monitor  Likely chemotherapy induced  Monitor CBC    VTE Pharmacologic Prophylaxis: VTE Score: 5 High Risk (Score >/= 5)  - Pharmacological DVT Prophylaxis Ordered: rivaroxaban (Xarelto). Sequential Compression Devices Ordered.    Mobility:   Basic Mobility Inpatient Raw Score: 23  JH-HLM Goal: 7: Walk 25 feet or more  JH-HLM Achieved: 7: Walk 25 feet or more  JH-HLM Goal achieved. Continue to encourage appropriate mobility.    Patient Centered Rounds: I performed bedside rounds with nursing staff today.   Discussions with Specialists or Other Care Team Provider: multidisciplinary meeting    Education and Discussions with Family / Patient: patient.     Current Length of Stay: 2 day(s)  Current Patient Status: Inpatient   Certification Statement: The patient will continue to require additional inpatient hospital stay due to close monitoring  Discharge Plan: Anticipate discharge in 24-48 hrs to home.    Code Status: Level 1 - Full Code    Subjective   Patient reports feeling nausea and poor appetite.     Objective :  Temp:  [96.6 °F (35.9 °C)-97.6 °F (36.4 °C)] 96.6 °F (35.9 °C)  HR:  [88-92] 88  BP: ()/(55-91) 110/91  Resp:  [18] 18  SpO2:  [94 %-96 %] 96 %  O2 Device: None (Room air)    Body mass index is 36.24 kg/m².     Input and Output Summary (last 24 hours):     Intake/Output Summary (Last 24 hours) at 7/3/2025 1631  Last data filed at 7/3/2025 1300  Gross per 24 hour   Intake 3688.67 ml   Output 1975 ml   Net 1713.67 ml       Physical Exam  Constitutional:       General: He is not in acute distress.  HENT:      Head: Normocephalic and atraumatic.      Nose: No congestion.     Cardiovascular:      Rate and Rhythm: Normal rate and regular rhythm.   Pulmonary:      Effort: No respiratory distress.      Breath sounds: No wheezing or rales.   Abdominal:      General: There is no distension.      Tenderness: There is no abdominal tenderness. There is no guarding.     Musculoskeletal:      Right lower leg: No edema.      Left lower leg: No edema.     Skin:     General: Skin is warm and dry.     Neurological:      Mental Status: He  is oriented to person, place, and time.     Psychiatric:         Mood and Affect: Mood normal.         Lines/Drains:      Central Line:  Goal for removal: portacath               Lab Results: I have reviewed the following results:   Results from last 7 days   Lab Units 07/03/25  0459 07/02/25  0457   WBC Thousand/uL 2.04* 2.89*   HEMOGLOBIN g/dL 11.8* 11.9*   HEMATOCRIT % 36.9 37.8   PLATELETS Thousands/uL 124* 129*   SEGS PCT %  --  65   LYMPHO PCT % 23 24   MONO PCT % 5 5   EOS PCT % 4 5     Results from last 7 days   Lab Units 07/03/25  0459   SODIUM mmol/L 133*   POTASSIUM mmol/L 4.4   CHLORIDE mmol/L 104   CO2 mmol/L 20*   BUN mg/dL 65*   CREATININE mg/dL 1.94*   ANION GAP mmol/L 9   CALCIUM mg/dL 7.7*   ALBUMIN g/dL 3.4*   TOTAL BILIRUBIN mg/dL 0.45   ALK PHOS U/L 36   ALT U/L 7   AST U/L 8*   GLUCOSE RANDOM mg/dL 206*         Results from last 7 days   Lab Units 07/03/25  1505 07/03/25  1104 07/03/25  0549 07/02/25  2037 07/02/25  1601 07/02/25  1107 07/02/25  0604 07/02/25  0154 07/01/25  2231 07/01/25  2031 07/01/25  1829 07/01/25  1556   POC GLUCOSE mg/dl 175* 248* 183* 186* 168* 140 150* 134 91 116 274* 421*               Recent Cultures (last 7 days):   Results from last 7 days   Lab Units 07/02/25  1826   C DIFF TOXIN B BY PCR  Negative             Last 24 Hours Medication List:     Current Facility-Administered Medications:     acetaminophen (TYLENOL) tablet 650 mg, Q6H PRN    amitriptyline (ELAVIL) tablet 30 mg, HS    ammonium lactate (LAC-HYDRIN) 12 % lotion, BID PRN    carvedilol (COREG) tablet 12.5 mg, BID With Meals    [Held by provider] Empagliflozin TABS 25 mg, Daily    hydroCHLOROthiazide tablet 12.5 mg, BID    insulin glargine (LANTUS) subcutaneous injection 25 Units 0.25 mL, HS    insulin lispro (HumALOG/ADMELOG) 100 units/mL subcutaneous injection 1-5 Units, HS    insulin lispro (HumALOG/ADMELOG) 100 units/mL subcutaneous injection 1-6 Units, TID AC **AND** Fingerstick Glucose (POCT), TID  AC    insulin lispro (HumALOG/ADMELOG) 100 units/mL subcutaneous injection 8 Units, TID With Meals    lidocaine (LIDODERM) 5 % patch 1 patch, Daily    loperamide (IMODIUM) capsule 2 mg, 4x Daily PRN    methocarbamol (ROBAXIN) tablet 1,500 mg, Q6H PRN    ondansetron (ZOFRAN) injection 4 mg, Q6H PRN    oxyCODONE (ROXICODONE) IR tablet 5 mg, Q4H PRN **OR** oxyCODONE (ROXICODONE) split tablet 7.5 mg, Q4H PRN    pantoprazole (PROTONIX) EC tablet 40 mg, BID AC    rimegepant sulfate (NURTEC) disintegrating tablet 75 mg, Daily PRN    rivaroxaban (XARELTO) tablet 15 mg, Daily With Breakfast    traMADol (ULTRAM) tablet 100 mg, HS    Administrative Statements   Today, Patient Was Seen By: Farnaz Loredo MD  I have spent a total time of 37 minutes in caring for this patient on the day of the visit/encounter including Diagnostic results, Counseling / Coordination of care, Documenting in the medical record, Reviewing/placing orders in the medical record (including tests, medications, and/or procedures), and Communicating with other healthcare professionals .    **Please Note: This note may have been constructed using a voice recognition system.**

## 2025-07-03 NOTE — ASSESSMENT & PLAN NOTE
This is a 60-year-old male patient with history of recently diagnosed esophageal cancer in May 2025, diabetes, recently diagnosed acute DVT, hypertension who started chemotherapy 6/26 and presents with p.o. intolerance, nausea, vomiting and diarrhea.  Found to have acute kidney injury with creatinine of 2.97 with baseline creatinine 1.4-1.6  Initially associated with hypotension responded to 2 L normal saline boluses  VIVIANA secondary to prerenal causes due to GI losses in setting of chemotherapy, urinary retention may be contributing  Continues to improve, down to 1.94 today  Urine studies reviewed  Patient noted for urinary retention but is declining catheterization, continue to monitor  Monitor off IVF as patient reports developing LE edema  Monitor BMP, I's and O's closely      Lab Results   Component Value Date    EGFR 36 07/03/2025    EGFR 25 07/02/2025    EGFR 22 07/01/2025    CREATININE 1.94 (H) 07/03/2025    CREATININE 2.61 (H) 07/02/2025    CREATININE 2.87 (H) 07/01/2025

## 2025-07-03 NOTE — ASSESSMENT & PLAN NOTE
Maintained on tramadol -was held due to VIVIANA, however patient requests that it is resumed, 100 mg at bedtime  Discontinue oxycodone

## 2025-07-03 NOTE — ASSESSMENT & PLAN NOTE
Lab Results   Component Value Date    HGBA1C 10.4 (H) 04/06/2025       Recent Labs     07/02/25 2037 07/03/25  0549 07/03/25  1104 07/03/25  1505   POCGLU 186* 183* 248* 175*       Blood Sugar Average: Last 72 hrs:  (P) [id number]    Uncontrolled in setting of recent steroid therapy  Patient follows with endocrinology  Currently prescribed Lantus 20 units, Humalog 8 units 3 times daily, Jardiance - increased Lantus to 25 units due to hyperglycemia  Patient presents with VIVIANA, p.o. intolerance with elevated beta hydroxybutyrate, mildly increased anion gap, however normal carbon bicarbonate, therefore do not suspect DKA  Initially marked hyperglycemia requiring insulin drip, now transitioned back to subcutaneous insulin as above

## 2025-07-03 NOTE — ASSESSMENT & PLAN NOTE
Hypovolemic as well as pseudohyponatremia in setting of hyperglycemia  Sodium corrects to 133  Stable

## 2025-07-03 NOTE — ASSESSMENT & PLAN NOTE
In setting of hypercoagulability of malignancy  Prescribed Xarelto for 6 months therapy, however has not been taking due to misunderstanding  Instructed to resume, resumed while here, renally dosed

## 2025-07-03 NOTE — NURSING NOTE
Bladder scan 999. Pt refusing to be straight cath. Pt states he is working on it. I explained to him that his bladder could burst. Still refusing. Provider made aware

## 2025-07-03 NOTE — PLAN OF CARE
Problem: METABOLIC, FLUID AND ELECTROLYTES - ADULT  Goal: Electrolytes maintained within normal limits  Description: INTERVENTIONS:  - Monitor labs and assess patient for signs and symptoms of electrolyte imbalances  - Administer electrolyte replacement as ordered  - Monitor response to electrolyte replacements, including repeat lab results as appropriate  - Instruct patient on fluid and nutrition as appropriate  Outcome: Progressing  Goal: Fluid balance maintained  Description: INTERVENTIONS:  - Monitor labs   - Monitor I/O and WT  - Instruct patient on fluid and nutrition as appropriate  - Assess for signs & symptoms of volume excess or deficit  Outcome: Progressing  Goal: Glucose maintained within target range  Description: INTERVENTIONS:  - Monitor Blood Glucose as ordered  - Assess for signs and symptoms of hyperglycemia and hypoglycemia  - Administer ordered medications to maintain glucose within target range  - Assess nutritional intake and initiate nutrition service referral as needed  Outcome: Progressing     Problem: GENITOURINARY - ADULT  Goal: Maintains or returns to baseline urinary function  Description: INTERVENTIONS:  - Assess urinary function  - Encourage oral fluids to ensure adequate hydration if ordered  - Administer IV fluids as ordered to ensure adequate hydration  - Administer ordered medications as needed  - Offer frequent toileting  - Follow urinary retention protocol if ordered  Outcome: Progressing  Goal: Absence of urinary retention  Description: INTERVENTIONS:  - Assess patient’s ability to void and empty bladder  - Monitor I/O  - Bladder scan as needed  - Discuss with physician/AP medications to alleviate retention as needed  - Discuss catheterization for long term situations as appropriate  Outcome: Progressing

## 2025-07-03 NOTE — ASSESSMENT & PLAN NOTE
Patient presents with volume depletion, VIVIANA, hyponatremia  Hold lisinopril  HCTZ resumed per patient's request, with holding parameters  Continue carvedilol at decreased dose for now, with holding parameters

## 2025-07-04 LAB
ANION GAP SERPL CALCULATED.3IONS-SCNC: 8 MMOL/L (ref 4–13)
BUN SERPL-MCNC: 55 MG/DL (ref 5–25)
CALCIUM SERPL-MCNC: 7.9 MG/DL (ref 8.4–10.2)
CHLORIDE SERPL-SCNC: 105 MMOL/L (ref 96–108)
CO2 SERPL-SCNC: 20 MMOL/L (ref 21–32)
CREAT SERPL-MCNC: 1.6 MG/DL (ref 0.6–1.3)
ERYTHROCYTE [DISTWIDTH] IN BLOOD BY AUTOMATED COUNT: 12.3 % (ref 11.6–15.1)
GFR SERPL CREATININE-BSD FRML MDRD: 45 ML/MIN/1.73SQ M
GLUCOSE SERPL-MCNC: 149 MG/DL (ref 65–140)
GLUCOSE SERPL-MCNC: 174 MG/DL (ref 65–140)
GLUCOSE SERPL-MCNC: 182 MG/DL (ref 65–140)
GLUCOSE SERPL-MCNC: 184 MG/DL (ref 65–140)
GLUCOSE SERPL-MCNC: 225 MG/DL (ref 65–140)
HCT VFR BLD AUTO: 38 % (ref 36.5–49.3)
HGB BLD-MCNC: 12 G/DL (ref 12–17)
MCH RBC QN AUTO: 28.9 PG (ref 26.8–34.3)
MCHC RBC AUTO-ENTMCNC: 31.6 G/DL (ref 31.4–37.4)
MCV RBC AUTO: 92 FL (ref 82–98)
PLATELET # BLD AUTO: 142 THOUSANDS/UL (ref 149–390)
PMV BLD AUTO: 9.9 FL (ref 8.9–12.7)
POTASSIUM SERPL-SCNC: 4.3 MMOL/L (ref 3.5–5.3)
RBC # BLD AUTO: 4.15 MILLION/UL (ref 3.88–5.62)
SODIUM SERPL-SCNC: 133 MMOL/L (ref 135–147)
WBC # BLD AUTO: 2.08 THOUSAND/UL (ref 4.31–10.16)

## 2025-07-04 PROCEDURE — 80048 BASIC METABOLIC PNL TOTAL CA: CPT | Performed by: FAMILY MEDICINE

## 2025-07-04 PROCEDURE — 82948 REAGENT STRIP/BLOOD GLUCOSE: CPT

## 2025-07-04 PROCEDURE — 99232 SBSQ HOSP IP/OBS MODERATE 35: CPT | Performed by: FAMILY MEDICINE

## 2025-07-04 PROCEDURE — 85025 COMPLETE CBC W/AUTO DIFF WBC: CPT | Performed by: FAMILY MEDICINE

## 2025-07-04 RX ORDER — SUCRALFATE 1 G/1
1 TABLET ORAL
Status: DISCONTINUED | OUTPATIENT
Start: 2025-07-04 | End: 2025-07-05

## 2025-07-04 RX ORDER — TAMSULOSIN HYDROCHLORIDE 0.4 MG/1
0.4 CAPSULE ORAL
Status: DISCONTINUED | OUTPATIENT
Start: 2025-07-04 | End: 2025-07-09 | Stop reason: HOSPADM

## 2025-07-04 RX ORDER — SODIUM CHLORIDE 9 MG/ML
75 INJECTION, SOLUTION INTRAVENOUS CONTINUOUS
Status: DISCONTINUED | OUTPATIENT
Start: 2025-07-04 | End: 2025-07-06

## 2025-07-04 RX ORDER — ACETAMINOPHEN 10 MG/ML
1000 INJECTION, SOLUTION INTRAVENOUS EVERY 6 HOURS PRN
Status: ACTIVE | OUTPATIENT
Start: 2025-07-04 | End: 2025-07-06

## 2025-07-04 RX ORDER — INSULIN LISPRO 100 [IU]/ML
10 INJECTION, SOLUTION INTRAVENOUS; SUBCUTANEOUS
Status: DISCONTINUED | OUTPATIENT
Start: 2025-07-04 | End: 2025-07-05

## 2025-07-04 RX ORDER — MIRTAZAPINE 7.5 MG/1
7.5 TABLET, FILM COATED ORAL
Status: DISCONTINUED | OUTPATIENT
Start: 2025-07-04 | End: 2025-07-09 | Stop reason: HOSPADM

## 2025-07-04 RX ADMIN — PANTOPRAZOLE SODIUM 40 MG: 40 TABLET, DELAYED RELEASE ORAL at 07:55

## 2025-07-04 RX ADMIN — HYDROCHLOROTHIAZIDE 12.5 MG: 12.5 TABLET ORAL at 09:15

## 2025-07-04 RX ADMIN — ONDANSETRON 4 MG: 2 INJECTION INTRAMUSCULAR; INTRAVENOUS at 18:11

## 2025-07-04 RX ADMIN — RIVAROXABAN 15 MG: 10 TABLET, FILM COATED ORAL at 09:15

## 2025-07-04 RX ADMIN — SODIUM CHLORIDE 75 ML/HR: 0.9 INJECTION, SOLUTION INTRAVENOUS at 19:51

## 2025-07-04 RX ADMIN — ONDANSETRON 4 MG: 2 INJECTION INTRAMUSCULAR; INTRAVENOUS at 05:47

## 2025-07-04 RX ADMIN — ONDANSETRON 4 MG: 2 INJECTION INTRAMUSCULAR; INTRAVENOUS at 12:06

## 2025-07-04 RX ADMIN — SUCRALFATE 1 G: 1 TABLET ORAL at 12:07

## 2025-07-04 RX ADMIN — RIMEGEPANT SULFATE 75 MG: 75 TABLET, ORALLY DISINTEGRATING ORAL at 11:27

## 2025-07-04 RX ADMIN — MORPHINE SULFATE 2 MG: 2 INJECTION, SOLUTION INTRAMUSCULAR; INTRAVENOUS at 18:13

## 2025-07-04 RX ADMIN — INSULIN LISPRO 8 UNITS: 100 INJECTION, SOLUTION INTRAVENOUS; SUBCUTANEOUS at 11:26

## 2025-07-04 RX ADMIN — INSULIN LISPRO 2 UNITS: 100 INJECTION, SOLUTION INTRAVENOUS; SUBCUTANEOUS at 11:26

## 2025-07-04 RX ADMIN — LIDOCAINE 5% 1 PATCH: 700 PATCH TOPICAL at 09:19

## 2025-07-04 NOTE — PLAN OF CARE
Problem: PAIN - ADULT  Goal: Verbalizes/displays adequate comfort level or baseline comfort level  Description: Interventions:  - Encourage patient to monitor pain and request assistance  - Assess pain using appropriate pain scale  - Administer analgesics as ordered based on type and severity of pain and evaluate response  - Implement non-pharmacological measures as appropriate and evaluate response  - Consider cultural and social influences on pain and pain management  - Notify physician/advanced practitioner if interventions unsuccessful or patient reports new pain  - Educate patient/family on pain management process including their role and importance of  reporting pain   - Provide non-pharmacologic/complimentary pain relief interventions  Outcome: Progressing      Problem: Potential for Falls  Goal: Patient will remain free of falls  Description: INTERVENTIONS:  - Educate patient/family on patient safety including physical limitations  - Instruct patient to call for assistance with activity   - Consider consulting OT/PT to assist with strengthening/mobility based on AM PAC & JH-HLM score  - Consult OT/PT to assist with strengthening/mobility   - Keep Call bell within reach  - Keep bed low and locked with side rails adjusted as appropriate  - Keep care items and personal belongings within reach  - Initiate and maintain comfort rounds  - Make Fall Risk Sign visible to staff  - Apply yellow socks and bracelet for high fall risk patients  - Consider moving patient to room near nurses station  Outcome: Progressing     Problem: METABOLIC, FLUID AND ELECTROLYTES - ADULT  Goal: Electrolytes maintained within normal limits  Description: INTERVENTIONS:  - Monitor labs and assess patient for signs and symptoms of electrolyte imbalances  - Administer electrolyte replacement as ordered  - Monitor response to electrolyte replacements, including repeat lab results as appropriate  - Instruct patient on fluid and nutrition as  appropriate  Outcome: Progressing     Problem: METABOLIC, FLUID AND ELECTROLYTES - ADULT  Goal: Fluid balance maintained  Description: INTERVENTIONS:  - Monitor labs   - Monitor I/O and WT  - Instruct patient on fluid and nutrition as appropriate  - Assess for signs & symptoms of volume excess or deficit  Outcome: Progressing     Problem: METABOLIC, FLUID AND ELECTROLYTES - ADULT  Goal: Glucose maintained within target range  Description: INTERVENTIONS:  - Monitor Blood Glucose as ordered  - Assess for signs and symptoms of hyperglycemia and hypoglycemia  - Administer ordered medications to maintain glucose within target range  - Assess nutritional intake and initiate nutrition service referral as needed  Outcome: Progressing     Problem: GENITOURINARY - ADULT  Goal: Maintains or returns to baseline urinary function  Description: INTERVENTIONS:  - Assess urinary function  - Encourage oral fluids to ensure adequate hydration if ordered  - Administer IV fluids as ordered to ensure adequate hydration  - Administer ordered medications as needed  - Offer frequent toileting  - Follow urinary retention protocol if ordered  Outcome: Progressing     Problem: GENITOURINARY - ADULT  Goal: Absence of urinary retention  Description: INTERVENTIONS:  - Assess patient’s ability to void and empty bladder  - Monitor I/O  - Bladder scan as needed  - Discuss with physician/AP medications to alleviate retention as needed  - Discuss catheterization for long term situations as appropriate  Outcome: Progressing

## 2025-07-04 NOTE — PLAN OF CARE
Problem: GENITOURINARY - ADULT  Goal: Maintains or returns to baseline urinary function  Description: INTERVENTIONS:  - Assess urinary function  - Encourage oral fluids to ensure adequate hydration if ordered  - Administer ordered medications as needed  - Offer frequent toileting  - Follow urinary retention protocol if ordered  Outcome: Progressing  Goal: Absence of urinary retention  Description: INTERVENTIONS:  - Assess patient’s ability to void and empty bladder  - Monitor I/O  - Bladder scan as needed  - Discuss with physician/AP medications to alleviate retention as needed  - Discuss catheterization for long term situations as appropriate  Outcome: Progressing     Problem: METABOLIC, FLUID AND ELECTROLYTES - ADULT  Goal: Glucose maintained within target range  Description: INTERVENTIONS:  - Monitor Blood Glucose as ordered  - Assess for signs and symptoms of hyperglycemia and hypoglycemia  - Administer ordered medications to maintain glucose within target range  - Assess nutritional intake and initiate nutrition service referral as needed  Outcome: Progressing     Problem: PAIN - ADULT  Goal: Verbalizes/displays adequate comfort level or baseline comfort level  Description: Interventions:  - Encourage patient to monitor pain and request assistance  - Assess pain using appropriate pain scale  - Administer analgesics as ordered based on type and severity of pain and evaluate response  - Implement non-pharmacological measures as appropriate and evaluate response  - Consider cultural and social influences on pain and pain management  - Notify physician/advanced practitioner if interventions unsuccessful or patient reports new pain  - Educate patient/family on pain management process including their role and importance of  reporting pain   - Provide non-pharmacologic/complimentary pain relief interventions  Outcome: Progressing

## 2025-07-04 NOTE — ASSESSMENT & PLAN NOTE
This is a 60-year-old male patient with history of recently diagnosed esophageal cancer in May 2025, diabetes, recently diagnosed acute DVT, hypertension who started chemotherapy 6/26 and presents with p.o. intolerance, nausea, vomiting and diarrhea.  Found to have acute kidney injury with creatinine of 2.97 with baseline creatinine 1.4-1.6  Initially associated with hypotension responded to 2 L normal saline boluses  VIVIANA secondary to prerenal causes due to GI losses in setting of chemotherapy, urinary retention may be contributing  Continues to improve, down to 1.6 today  Urine studies reviewed  Patient noted for urinary retention but is declining catheterization, continue to monitor  Monitor off IVF as patient reports developing LE edema  Monitor BMP, I's and O's closely      Lab Results   Component Value Date    EGFR 45 07/04/2025    EGFR 36 07/03/2025    EGFR 25 07/02/2025    CREATININE 1.60 (H) 07/04/2025    CREATININE 1.94 (H) 07/03/2025    CREATININE 2.61 (H) 07/02/2025

## 2025-07-04 NOTE — PROGRESS NOTES
Progress Note - Hospitalist   Name: Yoni Castillo 60 y.o. male I MRN: [id number]  Unit/Bed#: 7T [facility] 706-01 I Date of Admission: 7/1/2025   Date of Service: 7/4/2025 I Hospital Day: 3    Assessment & Plan  Acute kidney injury superimposed on stage 3a chronic kidney disease (HCC)  This is a 60-year-old male patient with history of recently diagnosed esophageal cancer in May 2025, diabetes, recently diagnosed acute DVT, hypertension who started chemotherapy 6/26 and presents with p.o. intolerance, nausea, vomiting and diarrhea.  Found to have acute kidney injury with creatinine of 2.97 with baseline creatinine 1.4-1.6  Initially associated with hypotension responded to 2 L normal saline boluses  VIVIANA secondary to prerenal causes due to GI losses in setting of chemotherapy, urinary retention may be contributing  Continues to improve, down to 1.6 today  Urine studies reviewed  Patient noted for urinary retention but is declining catheterization, continue to monitor  Monitor off IVF as patient reports developing LE edema  Monitor BMP, I's and O's closely      Lab Results   Component Value Date    EGFR 45 07/04/2025    EGFR 36 07/03/2025    EGFR 25 07/02/2025    CREATININE 1.60 (H) 07/04/2025    CREATININE 1.94 (H) 07/03/2025    CREATININE 2.61 (H) 07/02/2025     Essential hypertension  Patient presents with volume depletion, VIVIANA, hyponatremia  Hold lisinopril  HCTZ resumed per patient's request, with holding parameters  Continue carvedilol at decreased dose for now, with holding parameters  Type 2 diabetes mellitus with chronic kidney disease, without long-term current use of insulin ([facility])  Lab Results   Component Value Date    HGBA1C 10.4 (H) 04/06/2025       Recent Labs     07/03/25  1505 07/03/25  2038 07/04/25  0546 07/04/25  1101   POCGLU 175* 185* 174* 225*       Blood Sugar Average: Last 72 hrs:  (P) 218.9121117948914452    Uncontrolled in setting of recent steroid therapy  Patient follows with  endocrinology  Currently prescribed Lantus 25 units, increase Humalog to 10 units 3 times daily, Jardiance -  Patient presents with VIVIANA, p.o. intolerance with elevated beta hydroxybutyrate, mildly increased anion gap, however normal carbon bicarbonate, therefore do not suspect DKA  Initially marked hyperglycemia requiring insulin drip, now transitioned back to subcutaneous insulin as above  Due to patient's diet intolerance(patient reports not being able to eat any solid food and attempts to drink Glucerna diluted with milk, but reports a lot of discomfort during that), will liberate diet to regular and provide with different regular supplements to improve tolerance.    Chronic kidney disease, stage 3 (HCC)  Lab Results   Component Value Date    EGFR 45 07/04/2025    EGFR 36 07/03/2025    EGFR 25 07/02/2025    CREATININE 1.60 (H) 07/04/2025    CREATININE 1.94 (H) 07/03/2025    CREATININE 2.61 (H) 07/02/2025   Baseline creatinine 1.4-1.6, presents with severe VIVIANA with creatinine of 2.97  Current creatinine is 1.60  Please refer to plan under VIVIANA  Opioid dependence, uncomplicated (HCC)  Maintained on tramadol -was held due to VIVIANA, however patient requests that it is resumed, 100 mg at bedtime  Discontinue oxycodone  Hyponatremia  Hypovolemic as well as pseudohyponatremia in setting of hyperglycemia  Sodium corrects to 133  Stable   Malignant neoplasm of lower third of esophagus (HCC)  Follows with heme-onc Dr. Novak  On chemotherapy with fluorouracil, leucovorin, oxaliplatin, docetaxel every 14 days, completed 6/26, next cycle scheduled for 7/9  Patient reports of difficulty tolerating diet due to abdominal discomfort and nausea  No swallowing difficulties reported  Will provide with Carafate for symptomatic relief  GI has seen the patient in May 2025.    Deep vein thrombosis (DVT) of femoral vein of left lower extremity (HCC)  In setting of hypercoagulability of malignancy  Prescribed Xarelto for 6 months therapy,  "however has not been taking due to misunderstanding  Instructed to resume, resumed while here, renally dosed    Urinary retention  Associated with VIVIANA  Patient has declined catheterization  Continue to monitor PVR, encourage ambulation  Monitor BMP  Consider in versus short-term outpatient urology evaluation  Started Flomax  Pancytopenia (HCC)  Mild  WBC trending down, continue to monitor  Likely chemotherapy induced  Monitor CBC    VTE Pharmacologic Prophylaxis: VTE Score: 5 High Risk (Score >/= 5) - Pharmacological DVT Prophylaxis Ordered: rivaroxaban (Xarelto). Sequential Compression Devices Ordered.    Mobility:   Basic Mobility Inpatient Raw Score: 23  JH-HLM Goal: 7: Walk 25 feet or more  JH-HLM Achieved: 7: Walk 25 feet or more  JH-HLM Goal achieved. Continue to encourage appropriate mobility.    Patient Centered Rounds: I performed bedside rounds with nursing staff today.   Discussions with Specialists or Other Care Team Provider: nursing    Education and Discussions with Family / Patient: Patient declined call to .     Current Length of Stay: 3 day(s)  Current Patient Status: Inpatient   Certification Statement: The patient will continue to require additional inpatient hospital stay due to poor oral diet tolerance  Discharge Plan: Anticipate discharge in 48 hrs to home with home services.    Code Status: Level 1 - Full Code    Subjective   Patient was seen and examined.  He reports not doing well this morning.  He has a lot of discomfort in the abdomen and feels nauseous.  He has difficulty tolerating any food, especially cooled products.  He has severe aversion to water and states that it tastes like \"battery liquid\"    Objective :  Temp:  [97.6 °F (36.4 °C)-99.2 °F (37.3 °C)] 99.2 °F (37.3 °C)  HR:  [86-88] 86  BP: (110-119)/(68-91) 116/68  Resp:  [18-20] 20  SpO2:  [93 %-96 %] 96 %  O2 Device: None (Room air)    Body mass index is 36.24 kg/m².     Input and Output Summary (last 24 hours): "     Intake/Output Summary (Last 24 hours) at 7/4/2025 1342  Last data filed at 7/4/2025 0900  Gross per 24 hour   Intake 420 ml   Output 1525 ml   Net -1105 ml       Physical Exam  Vitals and nursing note reviewed.   Constitutional:       General: He is not in acute distress.     Appearance: He is well-developed.   HENT:      Head: Normocephalic and atraumatic.     Cardiovascular:      Rate and Rhythm: Normal rate and regular rhythm.      Heart sounds: No murmur heard.  Pulmonary:      Effort: Pulmonary effort is normal. No respiratory distress.      Breath sounds: Normal breath sounds.   Abdominal:      Palpations: Abdomen is soft.     Musculoskeletal:         General: No swelling.      Cervical back: Neck supple.     Skin:     General: Skin is warm and dry.     Neurological:      Mental Status: He is alert.     Psychiatric:         Mood and Affect: Mood normal.           Lines/Drains:      Central Line:  Goal for removal: Will discontinue when meds requiring line are completed.               Lab Results: I have reviewed the following results:   Results from last 7 days   Lab Units 07/04/25  0501 07/03/25  0459 07/02/25  0457   WBC Thousand/uL 2.08* 2.04* 2.89*   HEMOGLOBIN g/dL 12.0 11.8* 11.9*   HEMATOCRIT % 38.0 36.9 37.8   PLATELETS Thousands/uL 142* 124* 129*   SEGS PCT %  --   --  65   LYMPHO PCT %  --  23 24   MONO PCT %  --  5 5   EOS PCT %  --  4 5     Results from last 7 days   Lab Units 07/04/25  0501 07/03/25  0459   SODIUM mmol/L 133* 133*   POTASSIUM mmol/L 4.3 4.4   CHLORIDE mmol/L 105 104   CO2 mmol/L 20* 20*   BUN mg/dL 55* 65*   CREATININE mg/dL 1.60* 1.94*   ANION GAP mmol/L 8 9   CALCIUM mg/dL 7.9* 7.7*   ALBUMIN g/dL  --  3.4*   TOTAL BILIRUBIN mg/dL  --  0.45   ALK PHOS U/L  --  36   ALT U/L  --  7   AST U/L  --  8*   GLUCOSE RANDOM mg/dL 184* 206*         Results from last 7 days   Lab Units 07/04/25  1101 07/04/25  0546 07/03/25  2038 07/03/25  1505 07/03/25  1104 07/03/25  0549  07/02/25  2037 07/02/25  1601 07/02/25  1107 07/02/25  0604 07/02/25  0154 07/01/25  2231   POC GLUCOSE mg/dl 225* 174* 185* 175* 248* 183* 186* 168* 140 150* 134 91               Recent Cultures (last 7 days):   Results from last 7 days   Lab Units 07/02/25  1826   C DIFF TOXIN B BY PCR  Negative       Imaging Results Review: No pertinent imaging studies reviewed.  Other Study Results Review: No additional pertinent studies reviewed.    Last 24 Hours Medication List:     Current Facility-Administered Medications:     acetaminophen (TYLENOL) tablet 650 mg, Q6H PRN    amitriptyline (ELAVIL) tablet 30 mg, HS    ammonium lactate (LAC-HYDRIN) 12 % lotion, BID PRN    carvedilol (COREG) tablet 12.5 mg, BID With Meals    [Held by provider] Empagliflozin TABS 25 mg, Daily    hydroCHLOROthiazide tablet 12.5 mg, BID    insulin glargine (LANTUS) subcutaneous injection 25 Units 0.25 mL, HS    insulin lispro (HumALOG/ADMELOG) 100 units/mL subcutaneous injection 1-5 Units, HS    insulin lispro (HumALOG/ADMELOG) 100 units/mL subcutaneous injection 1-6 Units, TID AC **AND** Fingerstick Glucose (POCT), TID AC    insulin lispro (HumALOG/ADMELOG) 100 units/mL subcutaneous injection 10 Units, TID With Meals    lidocaine (LIDODERM) 5 % patch 1 patch, Daily    loperamide (IMODIUM) capsule 2 mg, 4x Daily PRN    methocarbamol (ROBAXIN) tablet 1,500 mg, Q6H PRN    mirtazapine (REMERON) tablet 7.5 mg, HS    ondansetron (ZOFRAN) injection 4 mg, Q6H PRN    pantoprazole (PROTONIX) EC tablet 40 mg, BID AC    rimegepant sulfate (NURTEC) disintegrating tablet 75 mg, Daily PRN    rivaroxaban (XARELTO) tablet 15 mg, Daily With Breakfast    sucralfate (CARAFATE) tablet 1 g, 4x Daily (AC & HS)    tamsulosin (FLOMAX) capsule 0.4 mg, Daily With Dinner    traMADol (ULTRAM) tablet 100 mg, HS    Administrative Statements   Today, Patient Was Seen By: Rachele Cabrera MD      **Please Note: This note may have been constructed using a voice recognition  system.**

## 2025-07-04 NOTE — ASSESSMENT & PLAN NOTE
Lab Results   Component Value Date    HGBA1C 10.4 (H) 04/06/2025       Recent Labs     07/03/25  1505 07/03/25 2038 07/04/25  0546 07/04/25  1101   POCGLU 175* 185* 174* 225*       Blood Sugar Average: Last 72 hrs:  (P) [id number]    Uncontrolled in setting of recent steroid therapy  Patient follows with endocrinology  Currently prescribed Lantus 25 units, increase Humalog to 10 units 3 times daily, Jardiance -  Patient presents with VIVIANA, p.o. intolerance with elevated beta hydroxybutyrate, mildly increased anion gap, however normal carbon bicarbonate, therefore do not suspect DKA  Initially marked hyperglycemia requiring insulin drip, now transitioned back to subcutaneous insulin as above  Due to patient's diet intolerance(patient reports not being able to eat any solid food and attempts to drink Glucerna diluted with milk, but reports a lot of discomfort during that), will liberate diet to regular and provide with different regular supplements to improve tolerance.

## 2025-07-04 NOTE — NURSING NOTE
Missed doing a PVR on pt this afternoon. Pt has not voided since. Pt claims he has no urge to void and refuses to do a random bladder scan.

## 2025-07-04 NOTE — ASSESSMENT & PLAN NOTE
Follows with heme-onc Dr. Novak  On chemotherapy with fluorouracil, leucovorin, oxaliplatin, docetaxel every 14 days, completed 6/26, next cycle scheduled for 7/9  Patient reports of difficulty tolerating diet due to abdominal discomfort and nausea  No swallowing difficulties reported  Will provide with Carafate for symptomatic relief  GI has seen the patient in May 2025.

## 2025-07-04 NOTE — ASSESSMENT & PLAN NOTE
Associated with VIVIANA  Patient has declined catheterization  Continue to monitor PVR, encourage ambulation  Monitor BMP  Consider in versus short-term outpatient urology evaluation  Started Flomax

## 2025-07-04 NOTE — ASSESSMENT & PLAN NOTE
Lab Results   Component Value Date    EGFR 45 07/04/2025    EGFR 36 07/03/2025    EGFR 25 07/02/2025    CREATININE 1.60 (H) 07/04/2025    CREATININE 1.94 (H) 07/03/2025    CREATININE 2.61 (H) 07/02/2025   Baseline creatinine 1.4-1.6, presents with severe VIVIANA with creatinine of 2.97  Current creatinine is 1.60  Please refer to plan under VIVIANA

## 2025-07-04 NOTE — NURSING NOTE
Patient reported vomiting several times zofran was given x2 this shift c/o of generlized pain wanted an iv med reached out to med did order iv mso4 and tylenol patient opted for iv morphine patient refused to take any po meds at supper offered several times

## 2025-07-05 ENCOUNTER — TELEPHONE (OUTPATIENT)
Dept: OTHER | Facility: OTHER | Age: 60
End: 2025-07-05

## 2025-07-05 LAB
ANION GAP SERPL CALCULATED.3IONS-SCNC: 9 MMOL/L (ref 4–13)
ANISOCYTOSIS BLD QL SMEAR: PRESENT
BASOPHILS # BLD MANUAL: 0.07 THOUSAND/UL (ref 0–0.1)
BASOPHILS NFR MAR MANUAL: 4 % (ref 0–1)
BUN SERPL-MCNC: 50 MG/DL (ref 5–25)
CALCIUM SERPL-MCNC: 7.9 MG/DL (ref 8.4–10.2)
CHLORIDE SERPL-SCNC: 103 MMOL/L (ref 96–108)
CO2 SERPL-SCNC: 20 MMOL/L (ref 21–32)
CREAT SERPL-MCNC: 2.01 MG/DL (ref 0.6–1.3)
EOSINOPHIL # BLD MANUAL: 0 THOUSAND/UL (ref 0–0.4)
EOSINOPHIL NFR BLD MANUAL: 0 % (ref 0–6)
ERYTHROCYTE [DISTWIDTH] IN BLOOD BY AUTOMATED COUNT: 12.1 % (ref 11.6–15.1)
GFR SERPL CREATININE-BSD FRML MDRD: 34 ML/MIN/1.73SQ M
GLUCOSE SERPL-MCNC: 194 MG/DL (ref 65–140)
GLUCOSE SERPL-MCNC: 205 MG/DL (ref 65–140)
GLUCOSE SERPL-MCNC: 236 MG/DL (ref 65–140)
GLUCOSE SERPL-MCNC: 236 MG/DL (ref 65–140)
GLUCOSE SERPL-MCNC: 256 MG/DL (ref 65–140)
HCT VFR BLD AUTO: 36.6 % (ref 36.5–49.3)
HGB BLD-MCNC: 11.7 G/DL (ref 12–17)
LG PLATELETS BLD QL SMEAR: PRESENT
LYMPHOCYTES # BLD AUTO: 0.56 THOUSAND/UL (ref 0.6–4.47)
LYMPHOCYTES # BLD AUTO: 27 % (ref 14–44)
MCH RBC QN AUTO: 29.3 PG (ref 26.8–34.3)
MCHC RBC AUTO-ENTMCNC: 32 G/DL (ref 31.4–37.4)
MCV RBC AUTO: 92 FL (ref 82–98)
METAMYELOCYTE ABSOLUTE CT: 0.02 THOUSAND/UL (ref 0–0.1)
METAMYELOCYTES NFR BLD MANUAL: 1 % (ref 0–1)
MONOCYTES # BLD AUTO: 0.25 THOUSAND/UL (ref 0–1.22)
MONOCYTES NFR BLD: 14 % (ref 4–12)
NEUTROPHILS # BLD MANUAL: 0.91 THOUSAND/UL (ref 1.85–7.62)
NEUTS BAND NFR BLD MANUAL: 7 % (ref 0–8)
NEUTS SEG NFR BLD AUTO: 43 % (ref 43–75)
PLATELET # BLD AUTO: 158 THOUSANDS/UL (ref 149–390)
PLATELET BLD QL SMEAR: ADEQUATE
PMV BLD AUTO: 10.5 FL (ref 8.9–12.7)
POTASSIUM SERPL-SCNC: 4.6 MMOL/L (ref 3.5–5.3)
RBC # BLD AUTO: 4 MILLION/UL (ref 3.88–5.62)
RBC MORPH BLD: PRESENT
SODIUM SERPL-SCNC: 132 MMOL/L (ref 135–147)
VARIANT LYMPHS # BLD AUTO: 4 %
WBC # BLD AUTO: 1.82 THOUSAND/UL (ref 4.31–10.16)

## 2025-07-05 PROCEDURE — 85027 COMPLETE CBC AUTOMATED: CPT | Performed by: FAMILY MEDICINE

## 2025-07-05 PROCEDURE — 85007 BL SMEAR W/DIFF WBC COUNT: CPT | Performed by: FAMILY MEDICINE

## 2025-07-05 PROCEDURE — 99232 SBSQ HOSP IP/OBS MODERATE 35: CPT | Performed by: FAMILY MEDICINE

## 2025-07-05 PROCEDURE — 82948 REAGENT STRIP/BLOOD GLUCOSE: CPT

## 2025-07-05 PROCEDURE — 80048 BASIC METABOLIC PNL TOTAL CA: CPT | Performed by: FAMILY MEDICINE

## 2025-07-05 RX ORDER — HYDROCORTISONE ACETATE 25 MG/1
25 SUPPOSITORY RECTAL 2 TIMES DAILY
Status: DISCONTINUED | OUTPATIENT
Start: 2025-07-05 | End: 2025-07-09 | Stop reason: HOSPADM

## 2025-07-05 RX ORDER — METOCLOPRAMIDE HYDROCHLORIDE 5 MG/ML
10 INJECTION INTRAMUSCULAR; INTRAVENOUS EVERY 6 HOURS PRN
Status: DISCONTINUED | OUTPATIENT
Start: 2025-07-05 | End: 2025-07-09 | Stop reason: HOSPADM

## 2025-07-05 RX ORDER — HYDROCORTISONE 25 MG/G
CREAM TOPICAL 4 TIMES DAILY PRN
Status: DISCONTINUED | OUTPATIENT
Start: 2025-07-05 | End: 2025-07-09 | Stop reason: HOSPADM

## 2025-07-05 RX ORDER — INSULIN GLARGINE 100 [IU]/ML
30 INJECTION, SOLUTION SUBCUTANEOUS
Status: DISCONTINUED | OUTPATIENT
Start: 2025-07-05 | End: 2025-07-09 | Stop reason: HOSPADM

## 2025-07-05 RX ADMIN — RIVAROXABAN 15 MG: 10 TABLET, FILM COATED ORAL at 17:03

## 2025-07-05 RX ADMIN — PANTOPRAZOLE SODIUM 40 MG: 40 TABLET, DELAYED RELEASE ORAL at 06:06

## 2025-07-05 RX ADMIN — SODIUM CHLORIDE 75 ML/HR: 0.9 INJECTION, SOLUTION INTRAVENOUS at 09:10

## 2025-07-05 RX ADMIN — INSULIN LISPRO 3 UNITS: 100 INJECTION, SOLUTION INTRAVENOUS; SUBCUTANEOUS at 16:06

## 2025-07-05 RX ADMIN — HYDROCHLOROTHIAZIDE 12.5 MG: 12.5 TABLET ORAL at 17:03

## 2025-07-05 RX ADMIN — INSULIN LISPRO 2 UNITS: 100 INJECTION, SOLUTION INTRAVENOUS; SUBCUTANEOUS at 11:10

## 2025-07-05 RX ADMIN — ONDANSETRON 4 MG: 2 INJECTION INTRAMUSCULAR; INTRAVENOUS at 12:15

## 2025-07-05 RX ADMIN — AMITRIPTYLINE HYDROCHLORIDE 30 MG: 10 TABLET, FILM COATED ORAL at 21:12

## 2025-07-05 RX ADMIN — TRAMADOL HYDROCHLORIDE 100 MG: 50 TABLET, COATED ORAL at 21:12

## 2025-07-05 RX ADMIN — TAMSULOSIN HYDROCHLORIDE 0.4 MG: 0.4 CAPSULE ORAL at 15:16

## 2025-07-05 RX ADMIN — ONDANSETRON 4 MG: 2 INJECTION INTRAMUSCULAR; INTRAVENOUS at 06:11

## 2025-07-05 RX ADMIN — INSULIN LISPRO 2 UNITS: 100 INJECTION, SOLUTION INTRAVENOUS; SUBCUTANEOUS at 21:14

## 2025-07-05 RX ADMIN — SODIUM CHLORIDE 75 ML/HR: 0.9 INJECTION, SOLUTION INTRAVENOUS at 21:19

## 2025-07-05 RX ADMIN — PANTOPRAZOLE SODIUM 40 MG: 40 TABLET, DELAYED RELEASE ORAL at 15:16

## 2025-07-05 RX ADMIN — MIRTAZAPINE 7.5 MG: 7.5 TABLET, FILM COATED ORAL at 21:13

## 2025-07-05 RX ADMIN — INSULIN GLARGINE 30 UNITS: 100 INJECTION, SOLUTION SUBCUTANEOUS at 21:12

## 2025-07-05 RX ADMIN — METOCLOPRAMIDE 10 MG: 5 INJECTION, SOLUTION INTRAMUSCULAR; INTRAVENOUS at 19:14

## 2025-07-05 RX ADMIN — INSULIN LISPRO 2 UNITS: 100 INJECTION, SOLUTION INTRAVENOUS; SUBCUTANEOUS at 06:24

## 2025-07-05 NOTE — TELEPHONE ENCOUNTER
"Patient stated, \" I am currently in the hospital and I would like to know if I can have my appointment changed from an in person visit to a phone call.\"    Please follow up, thank you.      Appointment Date and time 7/7/2025 12:40 pm   " 

## 2025-07-05 NOTE — NURSING NOTE
Patient seemed to feel a little better this afternoon did take some pm meds and also an itialian ice

## 2025-07-05 NOTE — ASSESSMENT & PLAN NOTE
Lab Results   Component Value Date    HGBA1C 10.4 (H) 04/06/2025       Recent Labs     07/04/25  1551 07/04/25  2031 07/05/25  0559 07/05/25  1100   POCGLU 149* 182* 194* 205*       Blood Sugar Average: Last 72 hrs:  (P) 179.5138827146680131    Uncontrolled in setting of recent steroid therapy  Patient follows with endocrinology  Currently prescribed Lantus 30 units, holding Humalog with meals due to poor oral intake,  Jardiance -  Patient presents with VIVIANA, p.o. intolerance with elevated beta hydroxybutyrate, mildly increased anion gap, however normal carbon bicarbonate, therefore do not suspect DKA  Initially marked hyperglycemia requiring insulin drip, now transitioned back to subcutaneous insulin as above  Due to patient's diet intolerance(patient reports not being able to eat any solid food and attempts to drink Glucerna diluted with milk, but reports a lot of discomfort during that), will liberate diet to regular and provide with different regular supplements to improve tolerance.

## 2025-07-05 NOTE — ASSESSMENT & PLAN NOTE
In setting of hypercoagulability of malignancy  Prescribed Xarelto for 6 months therapy, however has not been taking due to misunderstanding  Instructed to resume, but patient now reports BRBPR after he has a bowel movement  Holding Xarelto until cleared by GI

## 2025-07-05 NOTE — ASSESSMENT & PLAN NOTE
This is a 60-year-old male patient with history of recently diagnosed esophageal cancer in May 2025, diabetes, recently diagnosed acute DVT, hypertension who started chemotherapy 6/26 and presents with p.o. intolerance, nausea, vomiting and diarrhea.  Found to have acute kidney injury with creatinine of 2.97 with baseline creatinine 1.4-1.6  Initially associated with hypotension responded to 2 L normal saline boluses  VIVIANA secondary to prerenal causes due to GI losses in setting of chemotherapy, urinary retention may be contributing  Creatinine is at 2 which is worse today  Patient noted for urinary retention but is declining catheterization, continue to monitor  Monitor off IVF as patient reports developing LE edema  Monitor BMP, I's and O's closely      Lab Results   Component Value Date    EGFR 34 07/05/2025    EGFR 45 07/04/2025    EGFR 36 07/03/2025    CREATININE 2.01 (H) 07/05/2025    CREATININE 1.60 (H) 07/04/2025    CREATININE 1.94 (H) 07/03/2025

## 2025-07-05 NOTE — PROGRESS NOTES
Progress Note - Hospitalist   Name: Yoni Castillo 60 y.o. male I MRN: [id number]  Unit/Bed#: 7T [facility] 706-01 I Date of Admission: 7/1/2025   Date of Service: 7/5/2025 I Hospital Day: 4    Assessment & Plan  Acute kidney injury superimposed on stage 3a chronic kidney disease (HCC)  This is a 60-year-old male patient with history of recently diagnosed esophageal cancer in May 2025, diabetes, recently diagnosed acute DVT, hypertension who started chemotherapy 6/26 and presents with p.o. intolerance, nausea, vomiting and diarrhea.  Found to have acute kidney injury with creatinine of 2.97 with baseline creatinine 1.4-1.6  Initially associated with hypotension responded to 2 L normal saline boluses  VIVIANA secondary to prerenal causes due to GI losses in setting of chemotherapy, urinary retention may be contributing  Creatinine is at 2 which is worse today  Patient noted for urinary retention but is declining catheterization, continue to monitor  Monitor off IVF as patient reports developing LE edema  Monitor BMP, I's and O's closely      Lab Results   Component Value Date    EGFR 34 07/05/2025    EGFR 45 07/04/2025    EGFR 36 07/03/2025    CREATININE 2.01 (H) 07/05/2025    CREATININE 1.60 (H) 07/04/2025    CREATININE 1.94 (H) 07/03/2025     Essential hypertension  Patient presents with volume depletion, VIVIANA, hyponatremia  Hold lisinopril  HCTZ resumed per patient's request, with holding parameters  Continue carvedilol at decreased dose for now, with holding parameters  Type 2 diabetes mellitus with chronic kidney disease, without long-term current use of insulin ([facility])  Lab Results   Component Value Date    HGBA1C 10.4 (H) 04/06/2025       Recent Labs     07/04/25  1551 07/04/25  2031 07/05/25  0559 07/05/25  1100   POCGLU 149* 182* 194* 205*       Blood Sugar Average: Last 72 hrs:  (P) [id number]    Uncontrolled in setting of recent steroid therapy  Patient follows with endocrinology  Currently prescribed Lantus 30  units, holding Humalog with meals due to poor oral intake,  Jardiance -  Patient presents with VIVIANA, p.o. intolerance with elevated beta hydroxybutyrate, mildly increased anion gap, however normal carbon bicarbonate, therefore do not suspect DKA  Initially marked hyperglycemia requiring insulin drip, now transitioned back to subcutaneous insulin as above  Due to patient's diet intolerance(patient reports not being able to eat any solid food and attempts to drink Glucerna diluted with milk, but reports a lot of discomfort during that), will liberate diet to regular and provide with different regular supplements to improve tolerance.    Chronic kidney disease, stage 3 (HCC)  Lab Results   Component Value Date    EGFR 34 07/05/2025    EGFR 45 07/04/2025    EGFR 36 07/03/2025    CREATININE 2.01 (H) 07/05/2025    CREATININE 1.60 (H) 07/04/2025    CREATININE 1.94 (H) 07/03/2025   Baseline creatinine 1.4-1.6, presents with severe VIVIANA with creatinine of 2.97  Current creatinine worsened today at 2.0  Continue IV fluids  Patient should be bladder scanned to rule out post-renal obstruction, but he is refusing at this time.  Consulted him on the risks of urinary obstruction and he states that he is willing to attempt it later.   Please refer to plan under VIVIANA  Opioid dependence, uncomplicated (HCC)  Maintained on tramadol -was held due to VIVIANA, however patient requests that it is resumed, 100 mg at bedtime  Discontinue oxycodone  Hyponatremia  Hypovolemic as well as pseudohyponatremia in setting of hyperglycemia  Sodium corrects to 133  Stable   Malignant neoplasm of lower third of esophagus (HCC)  Follows with heme-onc Dr. Novak  On chemotherapy with fluorouracil, leucovorin, oxaliplatin, docetaxel every 14 days, completed 6/26, next cycle scheduled for 7/9  Patient reports of difficulty tolerating diet due to abdominal discomfort and nausea  No swallowing difficulties reported  GI has seen the patient in May  2025.  Reconsulted GI    Deep vein thrombosis (DVT) of femoral vein of left lower extremity (HCC)  In setting of hypercoagulability of malignancy  Prescribed Xarelto for 6 months therapy, however has not been taking due to misunderstanding  Instructed to resume, but patient now reports BRBPR after he has a bowel movement  Holding Xarelto until cleared by GI    Urinary retention  Associated with VIVIANA  Patient has declined catheterization  Continue to monitor PVR, encourage ambulation  Monitor BMP  Consider in versus short-term outpatient urology evaluation  Started Flomax, but patient refused it  Pancytopenia (HCC)  Mild  WBC trending down, continue to monitor  Likely chemotherapy induced  Monitor CBC  Also, mild drop in Hgb  Close monitoring due to reports of rectal bleeding    VTE Pharmacologic Prophylaxis: VTE Score: 5 High Risk (Score >/= 5) - Pharmacological DVT Prophylaxis Contraindicated. Sequential Compression Devices Ordered.    Mobility:   Basic Mobility Inpatient Raw Score: 23  JH-HLM Goal: 7: Walk 25 feet or more  JH-HLM Achieved: 7: Walk 25 feet or more  JH-HLM Goal achieved. Continue to encourage appropriate mobility.    Patient Centered Rounds: I performed bedside rounds with nursing staff today.   Discussions with Specialists or Other Care Team Provider: Nursing    Education and Discussions with Family / Patient: Left a message for Louise Weber    Current Length of Stay: 4 day(s)  Current Patient Status: Inpatient   Certification Statement: The patient will continue to require additional inpatient hospital stay due to VIVIANA and decreased oral intake  Discharge Plan: Anticipate discharge in 48 hrs to home with home services.    Code Status: Level 1 - Full Code    Subjective   Ed was seen and examined. He is not feeling well today. His oral intake is minimal and he also reports seeing bright red blood on the bath tissue after wiping. In addition, he reports sharp pain in his anus.    Objective :  Temp:   [97.8 °F (36.6 °C)-99.3 °F (37.4 °C)] 97.8 °F (36.6 °C)  HR:  [97] 97  BP: (115-160)/(66-93) 115/66  Resp:  [18-20] 18  SpO2:  [94 %] 94 %  O2 Device: None (Room air)    Body mass index is 36.24 kg/m².     Input and Output Summary (last 24 hours):     Intake/Output Summary (Last 24 hours) at 7/5/2025 1201  Last data filed at 7/5/2025 0910  Gross per 24 hour   Intake 1438.75 ml   Output 1900 ml   Net -461.25 ml       Physical Exam  Vitals and nursing note reviewed.   Constitutional:       General: He is not in acute distress.     Appearance: He is well-developed.   HENT:      Head: Normocephalic and atraumatic.     Cardiovascular:      Rate and Rhythm: Normal rate and regular rhythm.      Heart sounds: No murmur heard.  Pulmonary:      Effort: Pulmonary effort is normal. No respiratory distress.      Breath sounds: Normal breath sounds.   Abdominal:      Palpations: Abdomen is soft.      Tenderness: There is no abdominal tenderness.     Musculoskeletal:         General: No swelling.      Cervical back: Neck supple.     Skin:     General: Skin is warm and dry.     Neurological:      Mental Status: He is alert.     Psychiatric:         Mood and Affect: Mood is depressed.           Lines/Drains:      Central Line:  Goal for removal: Will discontinue when meds requiring line are completed.               Lab Results: I have reviewed the following results:   Results from last 7 days   Lab Units 07/05/25  0620 07/03/25  0459 07/02/25  0457   WBC Thousand/uL 1.82*   < > 2.89*   HEMOGLOBIN g/dL 11.7*   < > 11.9*   HEMATOCRIT % 36.6   < > 37.8   PLATELETS Thousands/uL 158   < > 129*   BANDS PCT % 7  --   --    SEGS PCT %  --   --  65   LYMPHO PCT % 27   < > 24   MONO PCT % 14*   < > 5   EOS PCT % 0   < > 5    < > = values in this interval not displayed.     Results from last 7 days   Lab Units 07/05/25  0620 07/04/25  0501 07/03/25  0459   SODIUM mmol/L 132*   < > 133*   POTASSIUM mmol/L 4.6   < > 4.4   CHLORIDE mmol/L 103   <  > 104   CO2 mmol/L 20*   < > 20*   BUN mg/dL 50*   < > 65*   CREATININE mg/dL 2.01*   < > 1.94*   ANION GAP mmol/L 9   < > 9   CALCIUM mg/dL 7.9*   < > 7.7*   ALBUMIN g/dL  --   --  3.4*   TOTAL BILIRUBIN mg/dL  --   --  0.45   ALK PHOS U/L  --   --  36   ALT U/L  --   --  7   AST U/L  --   --  8*   GLUCOSE RANDOM mg/dL 236*   < > 206*    < > = values in this interval not displayed.         Results from last 7 days   Lab Units 07/05/25  1100 07/05/25  0559 07/04/25 2031 07/04/25  1551 07/04/25  1101 07/04/25  0546 07/03/25 2038 07/03/25  1505 07/03/25  1104 07/03/25  0549 07/02/25 2037 07/02/25  1601   POC GLUCOSE mg/dl 205* 194* 182* 149* 225* 174* 185* 175* 248* 183* 186* 168*               Recent Cultures (last 7 days):   Results from last 7 days   Lab Units 07/02/25  1826   C DIFF TOXIN B BY PCR  Negative             Last 24 Hours Medication List:     Current Facility-Administered Medications:     acetaminophen (Ofirmev) injection 1,000 mg, Q6H PRN    acetaminophen (TYLENOL) tablet 650 mg, Q6H PRN    amitriptyline (ELAVIL) tablet 30 mg, HS    ammonium lactate (LAC-HYDRIN) 12 % lotion, BID PRN    carvedilol (COREG) tablet 12.5 mg, BID With Meals    [Held by provider] Empagliflozin TABS 25 mg, Daily    hydroCHLOROthiazide tablet 12.5 mg, BID    hydrocortisone (ANUSOL-HC) 2.5 % rectal cream, 4x Daily PRN    hydrocortisone (ANUSOL-HC) rectal suppository 25 mg, BID    insulin glargine (LANTUS) subcutaneous injection 30 Units 0.3 mL, HS    insulin lispro (HumALOG/ADMELOG) 100 units/mL subcutaneous injection 1-5 Units, HS    insulin lispro (HumALOG/ADMELOG) 100 units/mL subcutaneous injection 1-6 Units, TID AC **AND** Fingerstick Glucose (POCT), TID AC    lidocaine (LIDODERM) 5 % patch 1 patch, Daily    loperamide (IMODIUM) capsule 2 mg, 4x Daily PRN    methocarbamol (ROBAXIN) tablet 1,500 mg, Q6H PRN    mirtazapine (REMERON) tablet 7.5 mg, HS    morphine injection 2 mg, Q4H PRN    ondansetron (ZOFRAN) injection 4  mg, Q6H PRN    pantoprazole (PROTONIX) EC tablet 40 mg, BID AC    rimegepant sulfate (NURTEC) disintegrating tablet 75 mg, Daily PRN    sodium chloride 0.9 % infusion, Continuous, Last Rate: 75 mL/hr (07/05/25 0910)    tamsulosin (FLOMAX) capsule 0.4 mg, Daily With Dinner    traMADol (ULTRAM) tablet 100 mg, HS    Administrative Statements   Today, Patient Was Seen By: Rachele Cabrera MD      **Please Note: This note may have been constructed using a voice recognition system.**

## 2025-07-05 NOTE — QUICK NOTE
Due to the concern about GI bleed, I stopped Xarelto earlier today.  Patient is now adamant about restarting this medication.   Explained risks and benefits of stopping Xarelto.

## 2025-07-05 NOTE — ASSESSMENT & PLAN NOTE
Follows with heme-onc Dr. Novak  On chemotherapy with fluorouracil, leucovorin, oxaliplatin, docetaxel every 14 days, completed 6/26, next cycle scheduled for 7/9  Patient reports of difficulty tolerating diet due to abdominal discomfort and nausea  No swallowing difficulties reported  GI has seen the patient in May 2025.  Reconsulted GI

## 2025-07-05 NOTE — PLAN OF CARE
Problem: Potential for Falls  Goal: Patient will remain free of falls  Description: INTERVENTIONS:  - Educate patient/family on patient safety including physical limitations  - Instruct patient to call for assistance with activity   - Consider consulting OT/PT to assist with strengthening/mobility based on AM PAC & JH-HLM score  - Consult OT/PT to assist with strengthening/mobility   - Keep Call bell within reach  - Keep bed low and locked with side rails adjusted as appropriate  - Keep care items and personal belongings within reach  - Initiate and maintain comfort rounds  - Make Fall Risk Sign visible to staff  - Apply yellow socks and bracelet for high fall risk patients  - Consider moving patient to room near nurses station  Outcome: Progressing     Problem: PAIN - ADULT  Goal: Verbalizes/displays adequate comfort level or baseline comfort level  Description: Interventions:  - Encourage patient to monitor pain and request assistance  - Assess pain using appropriate pain scale  - Administer analgesics as ordered based on type and severity of pain and evaluate response  - Implement non-pharmacological measures as appropriate and evaluate response  - Consider cultural and social influences on pain and pain management  - Notify physician/advanced practitioner if interventions unsuccessful or patient reports new pain  - Educate patient/family on pain management process including their role and importance of  reporting pain   - Provide non-pharmacologic/complimentary pain relief interventions  Outcome: Progressing     Problem: METABOLIC, FLUID AND ELECTROLYTES - ADULT  Goal: Electrolytes maintained within normal limits  Description: INTERVENTIONS:  - Monitor labs and assess patient for signs and symptoms of electrolyte imbalances  - Administer electrolyte replacement as ordered  - Monitor response to electrolyte replacements, including repeat lab results as appropriate  - Instruct patient on fluid and nutrition as  appropriate  Outcome: Progressing     Problem: GENITOURINARY - ADULT  Goal: Maintains or returns to baseline urinary function  Description: INTERVENTIONS:  - Assess urinary function  - Encourage oral fluids to ensure adequate hydration if ordered  - Administer ordered medications as needed  - Offer frequent toileting  - Follow urinary retention protocol if ordered  Outcome: Progressing

## 2025-07-05 NOTE — ASSESSMENT & PLAN NOTE
Mild  WBC trending down, continue to monitor  Likely chemotherapy induced  Monitor CBC  Also, mild drop in Hgb  Close monitoring due to reports of rectal bleeding

## 2025-07-05 NOTE — ASSESSMENT & PLAN NOTE
Associated with VIVIANA  Patient has declined catheterization  Continue to monitor PVR, encourage ambulation  Monitor BMP  Consider in versus short-term outpatient urology evaluation  Started Flomax, but patient refused it

## 2025-07-05 NOTE — ASSESSMENT & PLAN NOTE
Lab Results   Component Value Date    EGFR 34 07/05/2025    EGFR 45 07/04/2025    EGFR 36 07/03/2025    CREATININE 2.01 (H) 07/05/2025    CREATININE 1.60 (H) 07/04/2025    CREATININE 1.94 (H) 07/03/2025   Baseline creatinine 1.4-1.6, presents with severe VIVIANA with creatinine of 2.97  Current creatinine worsened today at 2.0  Continue IV fluids  Patient should be bladder scanned to rule out post-renal obstruction, but he is refusing at this time.  Consulted him on the risks of urinary obstruction and he states that he is willing to attempt it later.   Please refer to plan under VIVIANA

## 2025-07-06 PROBLEM — E87.8 LOW BICARBONATE LEVEL: Status: ACTIVE | Noted: 2025-07-06

## 2025-07-06 PROBLEM — N17.9 AKI (ACUTE KIDNEY INJURY) (HCC): Status: ACTIVE | Noted: 2025-07-06

## 2025-07-06 PROBLEM — E83.51 HYPOCALCEMIA: Status: ACTIVE | Noted: 2025-07-06

## 2025-07-06 LAB
ANION GAP SERPL CALCULATED.3IONS-SCNC: 12 MMOL/L (ref 4–13)
ANISOCYTOSIS BLD QL SMEAR: PRESENT
BASOPHILS # BLD MANUAL: 0.1 THOUSAND/UL (ref 0–0.1)
BASOPHILS NFR MAR MANUAL: 3 % (ref 0–1)
BUN SERPL-MCNC: 54 MG/DL (ref 5–25)
CALCIUM SERPL-MCNC: 7.6 MG/DL (ref 8.4–10.2)
CHLORIDE SERPL-SCNC: 103 MMOL/L (ref 96–108)
CO2 SERPL-SCNC: 17 MMOL/L (ref 21–32)
CREAT SERPL-MCNC: 2.49 MG/DL (ref 0.6–1.3)
EOSINOPHIL # BLD MANUAL: 0 THOUSAND/UL (ref 0–0.4)
EOSINOPHIL NFR BLD MANUAL: 0 % (ref 0–6)
ERYTHROCYTE [DISTWIDTH] IN BLOOD BY AUTOMATED COUNT: 12.3 % (ref 11.6–15.1)
GFR SERPL CREATININE-BSD FRML MDRD: 26 ML/MIN/1.73SQ M
GLUCOSE SERPL-MCNC: 206 MG/DL (ref 65–140)
GLUCOSE SERPL-MCNC: 213 MG/DL (ref 65–140)
GLUCOSE SERPL-MCNC: 221 MG/DL (ref 65–140)
GLUCOSE SERPL-MCNC: 231 MG/DL (ref 65–140)
GLUCOSE SERPL-MCNC: 239 MG/DL (ref 65–140)
HCT VFR BLD AUTO: 35.6 % (ref 36.5–49.3)
HGB BLD-MCNC: 11.2 G/DL (ref 12–17)
LYMPHOCYTES # BLD AUTO: 0.51 THOUSAND/UL (ref 0.6–4.47)
LYMPHOCYTES # BLD AUTO: 14 % (ref 14–44)
MCH RBC QN AUTO: 28.6 PG (ref 26.8–34.3)
MCHC RBC AUTO-ENTMCNC: 31.5 G/DL (ref 31.4–37.4)
MCV RBC AUTO: 91 FL (ref 82–98)
METAMYELOCYTE ABSOLUTE CT: 0.03 THOUSAND/UL (ref 0–0.1)
METAMYELOCYTES NFR BLD MANUAL: 1 % (ref 0–1)
MONOCYTES # BLD AUTO: 0.8 THOUSAND/UL (ref 0–1.22)
MONOCYTES NFR BLD: 25 % (ref 4–12)
MYELOCYTE ABSOLUTE CT: 0.1 THOUSAND/UL (ref 0–0.1)
MYELOCYTES NFR BLD MANUAL: 3 % (ref 0–1)
NEUTROPHILS # BLD MANUAL: 1.65 THOUSAND/UL (ref 1.85–7.62)
NEUTS BAND NFR BLD MANUAL: 16 % (ref 0–8)
NEUTS SEG NFR BLD AUTO: 36 % (ref 43–75)
NRBC BLD AUTO-RTO: 1 /100 WBC (ref 0–2)
PLATELET # BLD AUTO: 155 THOUSANDS/UL (ref 149–390)
PLATELET BLD QL SMEAR: ADEQUATE
PLATELET CLUMP BLD QL SMEAR: PRESENT
PMV BLD AUTO: 10.6 FL (ref 8.9–12.7)
POTASSIUM SERPL-SCNC: 4.3 MMOL/L (ref 3.5–5.3)
RBC # BLD AUTO: 3.91 MILLION/UL (ref 3.88–5.62)
RBC MORPH BLD: PRESENT
SODIUM SERPL-SCNC: 132 MMOL/L (ref 135–147)
TOXIC GRANULES BLD QL SMEAR: PRESENT
VARIANT LYMPHS # BLD AUTO: 2 %
WBC # BLD AUTO: 3.18 THOUSAND/UL (ref 4.31–10.16)

## 2025-07-06 PROCEDURE — 82948 REAGENT STRIP/BLOOD GLUCOSE: CPT

## 2025-07-06 PROCEDURE — 85007 BL SMEAR W/DIFF WBC COUNT: CPT | Performed by: FAMILY MEDICINE

## 2025-07-06 PROCEDURE — 80048 BASIC METABOLIC PNL TOTAL CA: CPT | Performed by: FAMILY MEDICINE

## 2025-07-06 PROCEDURE — 99223 1ST HOSP IP/OBS HIGH 75: CPT | Performed by: STUDENT IN AN ORGANIZED HEALTH CARE EDUCATION/TRAINING PROGRAM

## 2025-07-06 PROCEDURE — 99223 1ST HOSP IP/OBS HIGH 75: CPT | Performed by: INTERNAL MEDICINE

## 2025-07-06 PROCEDURE — 85027 COMPLETE CBC AUTOMATED: CPT | Performed by: FAMILY MEDICINE

## 2025-07-06 PROCEDURE — 99232 SBSQ HOSP IP/OBS MODERATE 35: CPT | Performed by: FAMILY MEDICINE

## 2025-07-06 RX ORDER — SODIUM CHLORIDE, SODIUM GLUCONATE, SODIUM ACETATE, POTASSIUM CHLORIDE, MAGNESIUM CHLORIDE, SODIUM PHOSPHATE, DIBASIC, AND POTASSIUM PHOSPHATE .53; .5; .37; .037; .03; .012; .00082 G/100ML; G/100ML; G/100ML; G/100ML; G/100ML; G/100ML; G/100ML
75 INJECTION, SOLUTION INTRAVENOUS CONTINUOUS
Status: DISCONTINUED | OUTPATIENT
Start: 2025-07-06 | End: 2025-07-07

## 2025-07-06 RX ORDER — PROMETHAZINE HYDROCHLORIDE 25 MG/ML
25 INJECTION, SOLUTION INTRAMUSCULAR; INTRAVENOUS EVERY 6 HOURS PRN
Status: DISCONTINUED | OUTPATIENT
Start: 2025-07-06 | End: 2025-07-06

## 2025-07-06 RX ORDER — PROMETHAZINE HYDROCHLORIDE 25 MG/ML
25 INJECTION, SOLUTION INTRAMUSCULAR; INTRAVENOUS EVERY 6 HOURS PRN
Status: DISCONTINUED | OUTPATIENT
Start: 2025-07-06 | End: 2025-07-09 | Stop reason: HOSPADM

## 2025-07-06 RX ADMIN — MIRTAZAPINE 7.5 MG: 7.5 TABLET, FILM COATED ORAL at 22:12

## 2025-07-06 RX ADMIN — PANTOPRAZOLE SODIUM 40 MG: 40 TABLET, DELAYED RELEASE ORAL at 16:44

## 2025-07-06 RX ADMIN — PANTOPRAZOLE SODIUM 40 MG: 40 TABLET, DELAYED RELEASE ORAL at 06:21

## 2025-07-06 RX ADMIN — INSULIN LISPRO 2 UNITS: 100 INJECTION, SOLUTION INTRAVENOUS; SUBCUTANEOUS at 11:07

## 2025-07-06 RX ADMIN — INSULIN LISPRO 3 UNITS: 100 INJECTION, SOLUTION INTRAVENOUS; SUBCUTANEOUS at 16:45

## 2025-07-06 RX ADMIN — INSULIN LISPRO 2 UNITS: 100 INJECTION, SOLUTION INTRAVENOUS; SUBCUTANEOUS at 06:20

## 2025-07-06 RX ADMIN — RIVAROXABAN 15 MG: 10 TABLET, FILM COATED ORAL at 08:30

## 2025-07-06 RX ADMIN — INSULIN LISPRO 1 UNITS: 100 INJECTION, SOLUTION INTRAVENOUS; SUBCUTANEOUS at 22:23

## 2025-07-06 RX ADMIN — TAMSULOSIN HYDROCHLORIDE 0.4 MG: 0.4 CAPSULE ORAL at 16:44

## 2025-07-06 RX ADMIN — ONDANSETRON 4 MG: 2 INJECTION INTRAMUSCULAR; INTRAVENOUS at 13:34

## 2025-07-06 RX ADMIN — TRAMADOL HYDROCHLORIDE 100 MG: 50 TABLET, COATED ORAL at 22:12

## 2025-07-06 RX ADMIN — SODIUM CHLORIDE, SODIUM GLUCONATE, SODIUM ACETATE, POTASSIUM CHLORIDE, MAGNESIUM CHLORIDE, SODIUM PHOSPHATE, DIBASIC, AND POTASSIUM PHOSPHATE 75 ML/HR: .53; .5; .37; .037; .03; .012; .00082 INJECTION, SOLUTION INTRAVENOUS at 11:06

## 2025-07-06 RX ADMIN — LIDOCAINE 5% 1 PATCH: 700 PATCH TOPICAL at 08:30

## 2025-07-06 RX ADMIN — INSULIN GLARGINE 30 UNITS: 100 INJECTION, SOLUTION SUBCUTANEOUS at 22:12

## 2025-07-06 RX ADMIN — AMITRIPTYLINE HYDROCHLORIDE 30 MG: 10 TABLET, FILM COATED ORAL at 22:12

## 2025-07-06 RX ADMIN — METOCLOPRAMIDE 10 MG: 5 INJECTION, SOLUTION INTRAMUSCULAR; INTRAVENOUS at 14:48

## 2025-07-06 RX ADMIN — ONDANSETRON 8 MG: 2 INJECTION INTRAMUSCULAR; INTRAVENOUS at 22:17

## 2025-07-06 NOTE — ASSESSMENT & PLAN NOTE
-- Bicarbonate level is dropped to 17 no anion gap  -- Discontinue normal saline start isolated  -- Worsening renal function noted  -- Check a venous blood gas tomorrow   Physical Therapy Visit    Visit Type: Daily Treatment Note  Visit: 8  Referring Provider: Annette Pacheco PA-C  Medical Diagnosis (from order): M17.12 - Primary osteoarthritis of left knee  Z96.652 - S/P total knee arthroplasty, left   Patient alert and oriented X3.    SUBJECTIVE                                                                                                               Patient reports that he doesn't have any pain today but does have some discomfort in the left hamstring    Pain / Symptoms  - Pain rating (out of 10): Current: 0       OBJECTIVE                                                                                                                             Standing Balance  (CHRISSY = base of support)  Firm Surface: Single Leg     - Left, Eyes Open (sec): 7     - Left, Eyes Open details: supervision               Treatment     Therapeutic Exercise  Patient was instructed in parameters and technique with specific demonstration of the following activities to develop strength, endurance, range of motion, and muscle/joint flexibility to improve knee function, progress towards long term  goals, and maximize independence with HEP    Exercises:  Nu step level 6 x 6 minutes  Forward BOSU lunge 2x10  Standing knee flexion and heel raise on airex 2x10  Standing hip extension and abduction on airex 2x10  Forward step ups on BOSU with knee drive 2x10  Seated hamstring stretch 3x 15 seconds  Squat to chair touch 2x10  Single leg balance 2 x 30 seconds  Forward step ups on 10 inch step 2x10  Side ways step ups on 6 inch step 2x10  Seated marches with emphasis on postural control 2x10  Standing gastoc stretch using slant board 3 x 15 seconds      - Assistance needed: stand by assist for instruction and safety  - Cues: verbal and tactile cueing for correct muscle activation and form  - Corrected: posture  - Equipment used: as above  - Quality of movement/task: completed with increased time          Skilled  input: verbal instruction/cues and tactile instruction/cues    Writer verbally educated and received verbal consent for hand placement, positioning of patient, and techniques to be performed today from patient for clothing adjustments for techniques, hand placement and palpation for techniques and therapist position for techniques as described above and how they are pertinent to the patient's plan of care.  Home Exercise Program  Access Code: [email or web address]  URL: [email or web address]  Date: 12/02/2024  Prepared by: Safia Gaines    Exercises  - Sit to Stand with Armchair  - 1 x daily - 7 x weekly - 2 sets - 10 reps  - Supine Heel Slide  - 1 x daily - 7 x weekly - 2 sets - 10 reps  - Supine Bridge  - 1 x daily - 7 x weekly - 2 sets - 10 reps  - Sidelying Hip Abduction  - 1 x daily - 7 x weekly - 2 sets - 10 reps  - Supine Active Straight Leg Raise  - 1 x daily - 7 x weekly - 2 sets - 10 reps  - Standing Knee Flexion with Counter Support  - 1 x daily - 7 x weekly - 2 sets - 10 reps  - Standing Hip Extension with Unilateral Counter Support  - 1 x daily - 7 x weekly - 2 sets - 10 reps  - Standing Hip Abduction with Unilateral Counter Support  - 1 x daily - 7 x weekly - 2 sets - 10 reps  - Standing Single Leg Stance with Counter Support  - 1 x daily - 7 x weekly - 2 sets - 30 seconds hold  - Seated Hamstring Stretch  - 1 x daily - 7 x weekly - 2 sets - 10 reps - 15 seconds hold  - Heel raise  - 1 x daily - 7 x weekly - 2 sets - 10 reps      ASSESSMENT                                                                                                            Patient tolerated progression of exercises well without any significant increase in symptoms. Patient demonstrates improved single leg balance compared to previous dates. Updated home exercise program. Will continue to follow and progress per plan of care.    Education:   - Results of above outlined education: Verbalizes understanding and  Demonstrates understanding    PLAN                                                                                                                           Suggestions for next session as indicated: Continue with lower extremity strengthening       Therapy procedure time and total treatment time can be found documented on the Time Entry flowsheet

## 2025-07-06 NOTE — PROGRESS NOTES
Progress Note - Hospitalist   Name: Yoni Castillo 60 y.o. male I MRN: [id number]  Unit/Bed#: 7T [facility] 706-01 I Date of Admission: 7/1/2025   Date of Service: 7/6/2025 I Hospital Day: 5    Assessment & Plan  Acute kidney injury superimposed on stage 3a chronic kidney disease (HCC)  This is a 60-year-old male patient with history of recently diagnosed esophageal cancer in May 2025, diabetes, recently diagnosed acute DVT, hypertension who started chemotherapy 6/26 and presents with p.o. intolerance, nausea, vomiting and diarrhea.  Found to have acute kidney injury with creatinine of 2.97 with baseline creatinine 1.4-1.6  Initially associated with hypotension responded to 2 L normal saline boluses  VIVIANA secondary to prerenal causes due to GI losses in setting of chemotherapy, urinary retention may be contributing  Creatinine is at 2 which is worse today  Patient noted for urinary retention but is declining catheterization, continue to monitor  Monitor off IVF as patient reports developing LE edema  Monitor BMP, I's and O's closely      Lab Results   Component Value Date    EGFR 26 07/06/2025    EGFR 34 07/05/2025    EGFR 45 07/04/2025    CREATININE 2.49 (H) 07/06/2025    CREATININE 2.01 (H) 07/05/2025    CREATININE 1.60 (H) 07/04/2025     Essential hypertension  Patient presents with volume depletion, VIVIANA, hyponatremia  Hold lisinopril  HCTZ resumed per patient's request, with holding parameters  Continue carvedilol at decreased dose for now, with holding parameters  Type 2 diabetes mellitus with chronic kidney disease, without long-term current use of insulin ([facility])  Lab Results   Component Value Date    HGBA1C 10.4 (H) 04/06/2025       Recent Labs     07/05/25  1100 07/05/25  1604 07/05/25  2028 07/06/25  0534   POCGLU 205* 236* 256* 221*       Blood Sugar Average: Last 72 hrs:  (P) [id number]    Uncontrolled in setting of recent steroid therapy  Patient follows with endocrinology  Currently prescribed Lantus 30  units, holding Humalog with meals due to poor oral intake,  Jardiance -  Patient presents with VIVIANA, p.o. intolerance with elevated beta hydroxybutyrate, mildly increased anion gap, however normal carbon bicarbonate, therefore do not suspect DKA  Initially marked hyperglycemia requiring insulin drip, now transitioned back to subcutaneous insulin as above  Due to patient's diet intolerance(patient reports not being able to eat any solid food and attempts to drink Glucerna diluted with milk, but reports a lot of discomfort during that), will liberate diet to regular and provide with different regular supplements to improve tolerance.    Chronic kidney disease, stage 3 (HCC)  Lab Results   Component Value Date    EGFR 26 07/06/2025    EGFR 34 07/05/2025    EGFR 45 07/04/2025    CREATININE 2.49 (H) 07/06/2025    CREATININE 2.01 (H) 07/05/2025    CREATININE 1.60 (H) 07/04/2025   Baseline creatinine 1.4-1.6, presents with severe VIVIANA with creatinine of 2.97  Current creatinine worsened today at 2.49, nephrology consult placed   Continue IV fluids  Patient should be bladder scanned to rule out post-renal obstruction, but he is refusing at this time.  Consulted him on the risks of urinary obstruction and he states that he is willing to attempt it later.   Please refer to plan under VIVIANA  Opioid dependence, uncomplicated (HCC)  Maintained on tramadol -was held due to VIVIANA, however patient requests that it is resumed, 100 mg at bedtime  Discontinue oxycodone  Hyponatremia  Hypovolemic as well as pseudohyponatremia in setting of hyperglycemia  Sodium corrects to 133  Stable   Malignant neoplasm of lower third of esophagus (HCC)  Follows with heme-onc Dr. Novak  On chemotherapy with fluorouracil, leucovorin, oxaliplatin, docetaxel every 14 days, completed 6/26, next cycle scheduled for 7/9  Patient reports of difficulty tolerating diet due to abdominal discomfort and nausea  No swallowing difficulties reported  GI has seen the  patient in May 2025.  Reconsulted GI    Deep vein thrombosis (DVT) of femoral vein of left lower extremity (HCC)  In setting of hypercoagulability of malignancy  Prescribed Xarelto for 6 months therapy, however has not been taking due to misunderstanding  Instructed to resume, but patient now reports BRBPR after he has a bowel movement  Holding Xarelto until cleared by GI    Urinary retention  Associated with VIVIANA  Patient has declined catheterization  Continue to monitor PVR, encourage ambulation  Monitor BMP  Consider in versus short-term outpatient urology evaluation  Started Flomax, but patient refused it  Pancytopenia (HCC)  Mild  WBC trending down, continue to monitor  Likely chemotherapy induced  Monitor CBC  Also, mild drop in Hgb  Close monitoring due to reports of rectal bleeding    VTE Pharmacologic Prophylaxis: VTE Score: 5 High Risk (Score >/= 5) - Pharmacological DVT Prophylaxis Ordered: rivaroxaban (Xarelto). Sequential Compression Devices Ordered.    Mobility:   Basic Mobility Inpatient Raw Score: 23  JH-HLM Goal: 7: Walk 25 feet or more  JH-HLM Achieved: 7: Walk 25 feet or more  JH-HLM Goal achieved. Continue to encourage appropriate mobility.    Patient Centered Rounds: I performed bedside rounds with nursing staff today.   Discussions with Specialists or Other Care Team Provider: Nephrology     Education and Discussions with Family / Patient: Patient declined call to .     Current Length of Stay: 5 day(s)  Current Patient Status: Inpatient   Certification Statement: The patient will continue to require additional inpatient hospital stay due to IV fluids   Discharge Plan: Anticipate discharge in 24-48 hrs to home.    Code Status: Level 1 - Full Code    Subjective   Patient seen and examined. Not eating anything. Had vomiting green bilious material last night. Continues with nausea not controlled by reglan.     Objective :  Temp:  [97.5 °F (36.4 °C)-98.2 °F (36.8 °C)] 98.2 °F (36.8  °C)  HR:  [] 104  BP: (100-120)/(64-74) 100/64  Resp:  [17-18] 18  SpO2:  [91 %-96 %] 92 %  O2 Device: None (Room air)    Body mass index is 36.24 kg/m².     Input and Output Summary (last 24 hours):     Intake/Output Summary (Last 24 hours) at 7/6/2025 1005  Last data filed at 7/6/2025 0832  Gross per 24 hour   Intake 800 ml   Output 900 ml   Net -100 ml       Physical Exam  Vitals and nursing note reviewed.   Constitutional:       General: He is not in acute distress.     Appearance: He is well-developed.   HENT:      Head: Normocephalic and atraumatic.     Eyes:      Conjunctiva/sclera: Conjunctivae normal.       Cardiovascular:      Rate and Rhythm: Normal rate and regular rhythm.      Heart sounds: No murmur heard.  Pulmonary:      Effort: Pulmonary effort is normal. No respiratory distress.      Breath sounds: Normal breath sounds.   Abdominal:      Palpations: Abdomen is soft.      Tenderness: There is no abdominal tenderness.     Musculoskeletal:         General: No swelling.      Cervical back: Neck supple.     Skin:     General: Skin is warm and dry.      Capillary Refill: Capillary refill takes less than 2 seconds.     Neurological:      Mental Status: He is alert.     Psychiatric:         Mood and Affect: Mood normal.           Lines/Drains:      Central Line:  Goal for removal: Port accessed. Will de-access as appropriate.               Lab Results: I have reviewed the following results:   Results from last 7 days   Lab Units 07/06/25  0541 07/03/25  0459 07/02/25  0457   WBC Thousand/uL 3.18*   < > 2.89*   HEMOGLOBIN g/dL 11.2*   < > 11.9*   HEMATOCRIT % 35.6*   < > 37.8   PLATELETS Thousands/uL 155   < > 129*   BANDS PCT % 16*   < >  --    SEGS PCT %  --   --  65   LYMPHO PCT % 14   < > 24   MONO PCT % 25*   < > 5   EOS PCT % 0   < > 5    < > = values in this interval not displayed.     Results from last 7 days   Lab Units 07/06/25  0541 07/04/25  0501 07/03/25  0459   SODIUM mmol/L 132*   < >  133*   POTASSIUM mmol/L 4.3   < > 4.4   CHLORIDE mmol/L 103   < > 104   CO2 mmol/L 17*   < > 20*   BUN mg/dL 54*   < > 65*   CREATININE mg/dL 2.49*   < > 1.94*   ANION GAP mmol/L 12   < > 9   CALCIUM mg/dL 7.6*   < > 7.7*   ALBUMIN g/dL  --   --  3.4*   TOTAL BILIRUBIN mg/dL  --   --  0.45   ALK PHOS U/L  --   --  36   ALT U/L  --   --  7   AST U/L  --   --  8*   GLUCOSE RANDOM mg/dL 239*   < > 206*    < > = values in this interval not displayed.         Results from last 7 days   Lab Units 07/06/25  0534 07/05/25 2028 07/05/25  1604 07/05/25  1100 07/05/25  0559 07/04/25  2031 07/04/25  1551 07/04/25  1101 07/04/25  0546 07/03/25 2038 07/03/25  1505 07/03/25  1104   POC GLUCOSE mg/dl 221* 256* 236* 205* 194* 182* 149* 225* 174* 185* 175* 248*               Recent Cultures (last 7 days):   Results from last 7 days   Lab Units 07/02/25  1826   C DIFF TOXIN B BY PCR  Negative       Imaging Results Review: No pertinent imaging studies reviewed.  Other Study Results Review: No additional pertinent studies reviewed.    Last 24 Hours Medication List:     Current Facility-Administered Medications:     acetaminophen (Ofirmev) injection 1,000 mg, Q6H PRN    acetaminophen (TYLENOL) tablet 650 mg, Q6H PRN    amitriptyline (ELAVIL) tablet 30 mg, HS    ammonium lactate (LAC-HYDRIN) 12 % lotion, BID PRN    carvedilol (COREG) tablet 12.5 mg, BID With Meals    [Held by provider] Empagliflozin TABS 25 mg, Daily    hydroCHLOROthiazide tablet 12.5 mg, BID    hydrocortisone (ANUSOL-HC) 2.5 % rectal cream, 4x Daily PRN    hydrocortisone (ANUSOL-HC) rectal suppository 25 mg, BID    insulin glargine (LANTUS) subcutaneous injection 30 Units 0.3 mL, HS    insulin lispro (HumALOG/ADMELOG) 100 units/mL subcutaneous injection 1-5 Units, HS    insulin lispro (HumALOG/ADMELOG) 100 units/mL subcutaneous injection 1-6 Units, TID AC **AND** Fingerstick Glucose (POCT), TID AC    lidocaine (LIDODERM) 5 % patch 1 patch, Daily    loperamide (IMODIUM)  capsule 2 mg, 4x Daily PRN    methocarbamol (ROBAXIN) tablet 1,500 mg, Q6H PRN    metoclopramide (REGLAN) injection 10 mg, Q6H PRN    mirtazapine (REMERON) tablet 7.5 mg, HS    morphine injection 2 mg, Q4H PRN    ondansetron (ZOFRAN) injection 4 mg, Q6H PRN    pantoprazole (PROTONIX) EC tablet 40 mg, BID AC    promethazine (PHENERGAN) injection 25 mg, Q6H PRN    rimegepant sulfate (NURTEC) disintegrating tablet 75 mg, Daily PRN    rivaroxaban (XARELTO) tablet 15 mg, Daily With Breakfast    sodium chloride 0.9 % infusion, Continuous, Last Rate: 75 mL/hr (07/05/25 2119)    tamsulosin (FLOMAX) capsule 0.4 mg, Daily With Dinner    traMADol (ULTRAM) tablet 100 mg, HS    Administrative Statements   Today, Patient Was Seen By: Genia Morrissey, DO  I have spent a total time of 35 minutes in caring for this patient on the day of the visit/encounter including Diagnostic results, Instructions for management, Impressions, Counseling / Coordination of care, Documenting in the medical record, Reviewing/placing orders in the medical record (including tests, medications, and/or procedures), Obtaining or reviewing history  , and Communicating with other healthcare professionals .    **Please Note: This note may have been constructed using a voice recognition system.**

## 2025-07-06 NOTE — ASSESSMENT & PLAN NOTE
-- Present on admission, admission creatinine 2.97 mg/dL.  --Last outpatient creatinine was 1.84 mg per  --Patient with underlying chronic kidney disease stage III  --During the first hospitalization renal function did improve back to baseline on July 4 to 1.6 mg/dL, now worsening up to 2.49 mg/dL  --Patient has been having some soft blood pressure with poor oral intake along with nausea.  --Hold SGLT2 inhibitor due to acute kidney injury  --Hold lisinopril as you currently  --Urine analysis shows hyaline casts along with a few granular casts which could signify early ATN.  --I will hold hydrochlorothiazide  --Change normal saline to balance crystalloid fluids with Isolyte at 75 mL/h  --If the renal function worsens recommend checking a renal ultrasound  --Check phosphorus uric acid level tomorrow

## 2025-07-06 NOTE — CONSULTS
NEPHROLOGY HOSPITAL CONSULTATION   Yoni Castillo 60 y.o. male MRN: [id number]  Unit/Bed#: 7T [facility] 706-01 Encounter: [id number]    Brief History of Admission -60-year-old male with CKD stage III, esophageal cancer presents for nausea and decreased oral intake.  Nephrology comes for VIVIANA.    Assessment & Plan  VIVIANA (acute kidney injury) (HCC)  -- Present on admission, admission creatinine 2.97 mg/dL.  --Last outpatient creatinine was 1.84 mg per  --Patient with underlying chronic kidney disease stage III  --During the first hospitalization renal function did improve back to baseline on July 4 to 1.6 mg/dL, now worsening up to 2.49 mg/dL  --Patient has been having some soft blood pressure with poor oral intake along with nausea.  --Hold SGLT2 inhibitor due to acute kidney injury  --Hold lisinopril as you currently  --Urine analysis shows hyaline casts along with a few granular casts which could signify early ATN.  --I will hold hydrochlorothiazide  --Change normal saline to balance crystalloid fluids with Isolyte at 75 mL/h  --If the renal function worsens recommend checking a renal ultrasound  --Check phosphorus uric acid level tomorrow  Chronic kidney disease, stage 3 ([facility])  Lab Results   Component Value Date    EGFR 26 07/06/2025    EGFR 34 07/05/2025    EGFR 45 07/04/2025    CREATININE 2.49 (H) 07/06/2025    CREATININE 2.01 (H) 07/05/2025    CREATININE 1.60 (H) 07/04/2025     Chronic kidney disease stage IIIB  -- Baseline creatinine historically 1.3 to 1.6 mg/dL  --No outpatient follow-up  --Recently baseline creatinine has been worsening prior to admission and more closer to mid 1's recently  Essential hypertension  -- Blood pressure on the low side  --Trial of IV fluids with ice  --Hold lisinopril and hydrochlorothiazide  Type 2 diabetes mellitus with chronic kidney disease, without long-term current use of insulin ([facility])  Lab Results   Component Value Date    HGBA1C 10.4 (H) 04/06/2025   -- Poorly controlled  hemoglobin A1c 10.4  -- Hold SGLT2 inhibitor at this time due to volume depletion and VIVIANA    Recent Labs     07/05/25  1604 07/05/25 2028 07/06/25  0534 07/06/25  1021   POCGLU 236* 256* 221* 213*       Blood Sugar Average: Last 72 hrs:  (P) [id number]    Opioid dependence, uncomplicated (HCC)    Hyponatremia  -- Sodium 132 today has been chronic since February  --Monitor with Isolyte discontinue normal saline  --Discontinue hydrochlorothiazide and avoid going forward in the future  Malignant neoplasm of lower third of esophagus (HCC)  -- Patient requesting hematology consultation  Deep vein thrombosis (DVT) of femoral vein of left lower extremity (HCC)  -- Management as per the primary team  Urinary retention  -- Urinary retention protocol  Pancytopenia (HCC)  -- As per hematology  Low bicarbonate level  -- Bicarbonate level is dropped to 17 no anion gap  -- Discontinue normal saline start isolated  -- Worsening renal function noted  -- Check a venous blood gas tomorrow  Hypocalcemia  -- Check a CMP tomorrow  -- Check an ionized calcium level tomorrow    I have reviewed the nephrology recommendations including assessment and plan, with patient, and we are in agreement with renal plan including the information outlined above.    HISTORY OF PRESENT ILLNESS:  Requesting Physician: Genia Morrissey DO  Reason for Consult: Acute kidney injury with underlying chronic kidney disease    Yoni Castillo is a 60 y.o. male who was admitted to [facility] after presenting with nausea decreased oral intake. A renal consultation is requested today for assistance in the management of acute kidney.  Patient has a history of chronic kidney disease stage III has seen nephrology as an inpatient does not appear to have an outpatient nephrologist.  Has a history of cancer of the esophagus.  Presents for nausea decreased oral intake can only tolerate water and lemon.  No chest pain or shortness of breath.   No urinary symptoms reported.    PAST MEDICAL HISTORY:  Past Medical History[1]    PAST SURGICAL HISTORY:  Past Surgical History[2]    ALLERGIES:  Allergies[3]    SOCIAL HISTORY:  Social History     Substance and Sexual Activity   Alcohol Use Yes    Alcohol/week: 0.0 - 1.0 standard drinks of alcohol    Comment: ocassional     Social History     Substance and Sexual Activity   Drug Use Never     Tobacco Use History[4]    FAMILY HISTORY:  Family History[5]    MEDICATIONS:  Current Medications[6]    REVIEW OF SYSTEMS:  Constitutional: Negative for fatigue, anorexia, fever, chills, diaphoresis  HENT: Negative for postnasal drip  Eyes: Negative for visual disturbance.   Respiratory: Negative for cough, shortness of breath and wheezing.   Cardiovascular: Negative for chest pain, palpitations and leg swelling.   Gastrointestinal: Negative for abdominal pain, constipation, diarrhea, nausea and vomiting.   Genitourinary: No dysuria, hematuria  Endocrine: Negative for polyuria.   Musculoskeletal: Negative for arthralgias, back pain and joint swelling.   Skin: Negative for rash.   Neurological: Negative for focal weakness, headaches, dizziness.  Hematological: Negative for easy bruising or bleeding.  Psychiatric/Behavioral: Negative for confusion and sleep disturbance.   All the systems were reviewed and were negative except as documented on the HPI.    PHYSICAL EXAM:  Current Weight: Weight - Scale: 121 kg (267 lb 3.2 oz)  First Weight: Weight - Scale: 121 kg (267 lb 10.2 oz)  Vitals:    07/05/25 1439 07/05/25 2300 07/06/25 0714 07/06/25 0830   BP: 116/74 120/71 100/64    BP Location: Right arm Right arm Right arm    Pulse: 91 89 104 94   Resp: 17 18 18    Temp: 97.5 °F (36.4 °C) 97.8 °F (36.6 °C) 98.2 °F (36.8 °C)    TempSrc: Temporal Temporal Temporal    SpO2: 91% 96% 92%    Weight:       Height:           Intake/Output Summary (Last 24 hours) at 7/6/2025 1159  Last data filed at 7/6/2025 1106  Gross per 24 hour   Intake  2745 ml   Output 900 ml   Net 1845 ml     Physical Exam  Constitutional:       General: He is not in acute distress.     Appearance: He is well-developed.   HENT:      Head: Normocephalic and atraumatic.     Eyes:      General: No scleral icterus.     Conjunctiva/sclera: Conjunctivae normal.      Pupils: Pupils are equal, round, and reactive to light.       Cardiovascular:      Rate and Rhythm: Normal rate and regular rhythm.      Heart sounds: S1 normal and S2 normal. No murmur heard.     No friction rub. No gallop.   Pulmonary:      Effort: Pulmonary effort is normal. No respiratory distress.      Breath sounds: Normal breath sounds. No wheezing or rales.   Abdominal:      General: Bowel sounds are normal.      Palpations: Abdomen is soft.      Tenderness: There is no abdominal tenderness. There is no rebound.     Musculoskeletal:         General: Normal range of motion.      Cervical back: Normal range of motion and neck supple.     Skin:     Findings: No rash.     Neurological:      Mental Status: He is alert and oriented to person, place, and time.     Psychiatric:         Behavior: Behavior normal.           Invasive Devices:      Lab Results:   Results from last 7 days   Lab Units 07/06/25  0541 07/05/25  0620 07/04/25  0501 07/03/25  0459 07/02/25  0457 07/01/25  1424 07/01/25  1223   WBC Thousand/uL 3.18* 1.82* 2.08* 2.04* 2.89*  --  5.91   HEMOGLOBIN g/dL 11.2* 11.7* 12.0 11.8* 11.9*  --  13.8   HEMATOCRIT % 35.6* 36.6 38.0 36.9 37.8  --  41.4   PLATELETS Thousands/uL 155 158 142* 124* 129*   < > 190   POTASSIUM mmol/L 4.3 4.6 4.3 4.4 4.1   < > 4.8   CHLORIDE mmol/L 103 103 105 104 100   < > 88*   CO2 mmol/L 17* 20* 20* 20* 21   < > 25   BUN mg/dL 54* 50* 55* 65* 84*   < > 92*   CREATININE mg/dL 2.49* 2.01* 1.60* 1.94* 2.61*   < > 2.97*   CALCIUM mg/dL 7.6* 7.9* 7.9* 7.7* 7.8*   < > 9.3   MAGNESIUM mg/dL  --   --   --   --  3.4*  --   --    ALK PHOS U/L  --   --   --  36 35  --  46   ALT U/L  --   --   --   "7 7  --  10   AST U/L  --   --   --  8* 10*  --  9*    < > = values in this interval not displayed.     O    Portions of the record may have been created with voice recognition software. Occasional wrong word or \"sound a like\" substitutions may have occurred due to the inherent limitations of voice recognition software. Read the chart carefully and recognize, using context, where substitutions have occurred.If you have any questions, please contact the dictating provider.         [1]   Past Medical History:  Diagnosis Date    Allergic 1968    Seasonal    Arthritis 2012    Brain concussion Multiple    Cervical disc disease     Chronic kidney disease 2012    Chronic pain disorder     CTS (carpal tunnel syndrome) 2002    Diabetes mellitus (HCC)     Esophageal cancer (HCC)     Full dentures     only using upper    Head injury 1975    Headache(784.0) 1980    High blood sugar     Hypertension     Kidney stone     Liver disease     Low back pain     Cannot remember    Lumbar disc disease     Lumbosacral disc disease     Cannot remember    Migraines     Neuropathy in diabetes (HCC)     RLS (restless legs syndrome)     Skin cancer     in past    Thoracic disc disorder     Cannot remember    Visual impairment 2017    Cataracts. Surgery to correct in 2021   [2]   Past Surgical History:  Procedure Laterality Date    AMPUTATION  2022    Little toe left    CARPAL TUNNEL RELEASE  2002    CATARACT EXTRACTION Bilateral     COLONOSCOPY      CONTRACTURE Right 01/20/2025    Procedure: Right wrist and hand extensor tenolysis and wrist capsulotomy;  Surgeon: Carroll Mccarthy MD;  [facility];  Service: Orthopedics    EYE SURGERY  2021    FOOT SURGERY  2022    Left Foot    FRACTURE SURGERY  1968    Right Tib/Fib    GANGLION CYST EXCISION Left 03/25/2024    Procedure: EXCISION MUCOID CYST, INDEX FINGER DIP;  Surgeon: Carroll Mccarthy MD;  [facility];  Service: Orthopedics    GANGLION CYST EXCISION Right " 05/20/2024    Procedure: EXCISION MUCOID CYST - RIGHT INDEX AND MIDDLE FINGERS;  Surgeon: Carroll Mccarthy MD;  [facility];  Service: Orthopedics    HERNIA REPAIR      INCISION AND DRAINAGE  01/16/2024    IR NEPHROURETERAL ACCESS FOR UROLOGY PCNL  04/07/2025    IR PORT PLACEMENT  6/16/2025    KIDNEY SURGERY Right 06/2012    KNEE SURGERY Right 1980    MOUTH SURGERY      NV AMPUTATION METATARSAL W/TOE SINGLE Left 6/1/2022    Procedure: RAY RESECTION FOOT;  Surgeon: Hannah Melgoza DPM;  [facility];  Service: Podiatry    NV INCISION & DRAINAGE ABSCESS COMPLICATED/MULTIPLE Right 09/17/2024    Procedure: Irrigation and debridement right wrist and hand, any indicated procedures;  Surgeon: Carroll Mccarthy MD;  [facility];  Service: Orthopedics    NV INCISION EXTENSOR TENDON SHEATH WRIST Right 01/20/2025    Procedure: RELEASE DEQUERVAINS - Right;  Surgeon: Carroll Mccarthy MD;  [facility];  Service: Orthopedics    NV PERQ NL/PL LITHOTRP COMPLEX >2 CM MLT LOCATIONS Right 04/11/2025    Procedure: NEPHROLITHOTOMY  PERCUTANEOUS (PCNL);  Surgeon: Adis Hamilton MD;  [facility];  Service: Urology    NV RPR AA HERNIA 1ST < 3 CM REDUCIBLE N/A 7/14/2023    Procedure: REPAIR HERNIA INCISIONAL;  Surgeon: Matt Melo MD;  [facility];  Service: General    NV SYNOVECTOMY EXTENSOR TENDON SHTH WRIST 1 CMPRT Right 10/03/2024    Procedure: Irrigation and debridement, right wrist, volar and dorsal, possible extensor and flexor tenosynovectomy, any indicated procedures;  Surgeon: Carroll Mccarthy MD;  [facility];  Service: Orthopedics    NV SYNOVECTOMY EXTENSOR TENDON SHTH WRIST 1 CMPRT Right 03/24/2025    Procedure: SYNOVECTOMY / DEBRIDEMENT - FLEXOR CARPI RADIALIS AND FLEXOR CARPI ULNARIS TENDONS- RIGHT;  Surgeon: Carroll Mccarthy MD;  [facility];  Service: Orthopedics    TONSILLECTOMY  1968    Yes    WOUND DEBRIDEMENT  Left 4/28/2022    Procedure: Left foot washout;  Surgeon: Hannah Melgoza DPM;  [facility];  Service: Podiatry    WOUND DEBRIDEMENT Left 6/6/2022    Procedure: DEBRIDEMENT WOUND (WASH OUT);  Surgeon: Hannah Melgoza DPM;  [facility];  Service: Podiatry   [3]   Allergies  Allergen Reactions    Cephalexin Hives     Skin peeling  Tolerates penicillins (tolerated unasyn and zosyn)    Metformin GI Intolerance    Pollen Extract Sneezing   [4]   Social History  Tobacco Use   Smoking Status Never   Smokeless Tobacco Never   [5]   Family History  Problem Relation Name Age of Onset    Diabetes Paternal Grandmother Michelle     Diabetes Paternal Grandfather Maurice     Arthritis Maternal Grandmother Greatgrandmother  Corie    [6]   Current Facility-Administered Medications:     acetaminophen (Ofirmev) injection 1,000 mg, 1,000 mg, Intravenous, Q6H PRN, Rachele Cabrera MD    acetaminophen (TYLENOL) tablet 650 mg, 650 mg, Oral, Q6H PRN, Farnaz Loredo MD    amitriptyline (ELAVIL) tablet 30 mg, 30 mg, Oral, HS, Farnaz Loredo MD, 30 mg at 07/05/25 2112    ammonium lactate (LAC-HYDRIN) 12 % lotion, , Topical, BID PRN, Farnaz Loredo MD    carvedilol (COREG) tablet 12.5 mg, 12.5 mg, Oral, BID With Meals, Farnaz Loredo MD    [Held by provider] Empagliflozin TABS 25 mg, 25 mg, Oral, Daily, Farnaz Loredo MD, 25 mg at 07/01/25 1532    [Held by provider] hydroCHLOROthiazide tablet 12.5 mg, 12.5 mg, Oral, BID, Farnaz Loredo MD, 12.5 mg at 07/05/25 1703    hydrocortisone (ANUSOL-HC) 2.5 % rectal cream, , Topical, 4x Daily PRN, Rachele Cabrera MD    hydrocortisone (ANUSOL-HC) rectal suppository 25 mg, 25 mg, Rectal, BID, Rachele Cabrera MD    insulin glargine (LANTUS) subcutaneous injection 30 Units 0.3 mL, 30 Units, Subcutaneous, HS, Rachele Cabrera MD, 30 Units at 07/05/25 2112    insulin lispro (HumALOG/ADMELOG) 100 units/mL subcutaneous injection 1-5 Units, 1-5 Units,  Subcutaneous, HS, Farnaz Loredo MD, 2 Units at 07/05/25 2114    insulin lispro (HumALOG/ADMELOG) 100 units/mL subcutaneous injection 1-6 Units, 1-6 Units, Subcutaneous, TID AC, 2 Units at 07/06/25 1107 **AND** Fingerstick Glucose (POCT), , , TID AC, Farnaz Loredo MD    lidocaine (LIDODERM) 5 % patch 1 patch, 1 patch, Topical, Daily, Farnaz Loredo MD, 1 patch at 07/06/25 0830    loperamide (IMODIUM) capsule 2 mg, 2 mg, Oral, 4x Daily PRN, Farnaz Loredo MD, 2 mg at 07/03/25 1624    methocarbamol (ROBAXIN) tablet 1,500 mg, 1,500 mg, Oral, Q6H PRN, Farnaz Loredo MD, 1,500 mg at 07/03/25 0821    metoclopramide (REGLAN) injection 10 mg, 10 mg, Intravenous, Q6H PRN, Rachele Cabrera MD, 10 mg at 07/05/25 1914    mirtazapine (REMERON) tablet 7.5 mg, 7.5 mg, Oral, HS, Rachele Cabrera MD, 7.5 mg at 07/05/25 2113    morphine injection 2 mg, 2 mg, Intravenous, Q4H PRN, Rachele Cabrera MD, 2 mg at 07/04/25 1813    multi-electrolyte (Plasmalyte-A/Isolyte-S PH 7.4/Normosol-R) IV solution, 75 mL/hr, Intravenous, Continuous, Trinh Mcfarland MD, Last Rate: 75 mL/hr at 07/06/25 1106, 75 mL/hr at 07/06/25 1106    ondansetron (ZOFRAN) injection 4 mg, 4 mg, Intravenous, Q6H PRN, Farnaz Loredo MD, 4 mg at 07/05/25 1215    pantoprazole (PROTONIX) EC tablet 40 mg, 40 mg, Oral, BID AC, Farnaz Loredo MD, 40 mg at 07/06/25 0621    promethazine (PHENERGAN) injection 25 mg, 25 mg, Intramuscular, Q6H PRN, Genia Morrissey DO    rimegepant sulfate (NURTEC) disintegrating tablet 75 mg, 75 mg, Oral, Daily PRN, Farnaz Loredo MD, 75 mg at 07/04/25 1127    rivaroxaban (XARELTO) tablet 15 mg, 15 mg, Oral, Daily With Breakfast, Rachele Cabrera MD, 15 mg at 07/06/25 0830    tamsulosin (FLOMAX) capsule 0.4 mg, 0.4 mg, Oral, Daily With Dinner, Rachele Cabrera MD, 0.4 mg at 07/05/25 1516    traMADol (ULTRAM) tablet 100 mg, 100 mg, Oral, HS, Farnaz Loredo MD, 100 mg at 07/05/25 2112

## 2025-07-06 NOTE — NURSING NOTE
Urinary retention protocol in place. Patient has not voided since the morning. RN attempted to perform bladder scan; patient refused. Patient educated on the importance of bladder scanning to assess urinary retention. Patient continued to refuse. RN encouraged pt to attempt to void. Will reassess.

## 2025-07-06 NOTE — PLAN OF CARE
Problem: PAIN - ADULT  Goal: Verbalizes/displays adequate comfort level or baseline comfort level  Description: Interventions:  - Encourage patient to monitor pain and request assistance  - Assess pain using appropriate pain scale  - Administer analgesics as ordered based on type and severity of pain and evaluate response  - Implement non-pharmacological measures as appropriate and evaluate response  - Consider cultural and social influences on pain and pain management  - Notify physician/advanced practitioner if interventions unsuccessful or patient reports new pain  - Educate patient/family on pain management process including their role and importance of  reporting pain   - Provide non-pharmacologic/complimentary pain relief interventions  Outcome: Progressing     Problem: METABOLIC, FLUID AND ELECTROLYTES - ADULT  Goal: Electrolytes maintained within normal limits  Description: INTERVENTIONS:  - Monitor labs and assess patient for signs and symptoms of electrolyte imbalances  - Administer electrolyte replacement as ordered  - Monitor response to electrolyte replacements, including repeat lab results as appropriate  - Instruct patient on fluid and nutrition as appropriate  Outcome: Progressing     Problem: GENITOURINARY - ADULT  Goal: Maintains or returns to baseline urinary function  Description: INTERVENTIONS:  - Assess urinary function  - Encourage oral fluids to ensure adequate hydration if ordered  - Administer ordered medications as needed  - Offer frequent toileting  - Follow urinary retention protocol if ordered  Outcome: Progressing

## 2025-07-06 NOTE — PROGRESS NOTES
"Progress Note - Hospitalist   Name: Yoni Castillo 60 y.o. male I MRN: [id number]  Unit/Bed#: 7T [facility] 706-01 I Date of Admission: 7/1/2025   Date of Service: 7/6/2025 I Hospital Day: 5     This SmartSection is not supported in the current .       07/06/25 1032   Repeat Volume Status and Tissue Perfusion Assessment Performed   Date of Reassessment: 07/06/25   Time of Reassessment: 1033   Sepsis Reassessment Note: Click \"NEXT\" below (NOT \"close\") to generate sepsis reassessment note. YES (proceed by clicking \"NEXT\")       Vitals:    07/06/25 0830   BP:    Pulse: 94   Resp:    Temp:    SpO2:      Lab Results   Component Value Date    WBC 3.18 (L) 07/06/2025     Patients labs secondary to chemotherapy and not acute infectious process.   "

## 2025-07-06 NOTE — ASSESSMENT & PLAN NOTE
Lab Results   Component Value Date    EGFR 26 07/06/2025    EGFR 34 07/05/2025    EGFR 45 07/04/2025    CREATININE 2.49 (H) 07/06/2025    CREATININE 2.01 (H) 07/05/2025    CREATININE 1.60 (H) 07/04/2025     Chronic kidney disease stage IIIB  -- Baseline creatinine historically 1.3 to 1.6 mg/dL  --No outpatient follow-up  --Recently baseline creatinine has been worsening prior to admission and more closer to mid 1's recently

## 2025-07-06 NOTE — ASSESSMENT & PLAN NOTE
This is a 60-year-old male patient with history of recently diagnosed esophageal cancer in May 2025, diabetes, recently diagnosed acute DVT, hypertension who started chemotherapy 6/26 and presents with p.o. intolerance, nausea, vomiting and diarrhea.  Found to have acute kidney injury with creatinine of 2.97 with baseline creatinine 1.4-1.6  Initially associated with hypotension responded to 2 L normal saline boluses  VIVIANA secondary to prerenal causes due to GI losses in setting of chemotherapy, urinary retention may be contributing  Creatinine is at 2 which is worse today  Patient noted for urinary retention but is declining catheterization, continue to monitor  Monitor off IVF as patient reports developing LE edema  Monitor BMP, I's and O's closely      Lab Results   Component Value Date    EGFR 26 07/06/2025    EGFR 34 07/05/2025    EGFR 45 07/04/2025    CREATININE 2.49 (H) 07/06/2025    CREATININE 2.01 (H) 07/05/2025    CREATININE 1.60 (H) 07/04/2025

## 2025-07-06 NOTE — ASSESSMENT & PLAN NOTE
Lab Results   Component Value Date    HGBA1C 10.4 (H) 04/06/2025   -- Poorly controlled hemoglobin A1c 10.4  -- Hold SGLT2 inhibitor at this time due to volume depletion and VIVIANA    Recent Labs     07/05/25  1604 07/05/25 2028 07/06/25  0534 07/06/25  1021   POCGLU 236* 256* 221* 213*       Blood Sugar Average: Last 72 hrs:  (P) [id number]

## 2025-07-06 NOTE — ASSESSMENT & PLAN NOTE
-- Blood pressure on the low side  --Trial of IV fluids with ice  --Hold lisinopril and hydrochlorothiazide

## 2025-07-06 NOTE — ASSESSMENT & PLAN NOTE
Lab Results   Component Value Date    EGFR 26 07/06/2025    EGFR 34 07/05/2025    EGFR 45 07/04/2025    CREATININE 2.49 (H) 07/06/2025    CREATININE 2.01 (H) 07/05/2025    CREATININE 1.60 (H) 07/04/2025   Baseline creatinine 1.4-1.6, presents with severe VIVIANA with creatinine of 2.97  Current creatinine worsened today at 2.49, nephrology consult placed   Continue IV fluids  Patient should be bladder scanned to rule out post-renal obstruction, but he is refusing at this time.  Consulted him on the risks of urinary obstruction and he states that he is willing to attempt it later.   Please refer to plan under VIVIANA

## 2025-07-06 NOTE — CONSULTS
Consultation -  [facility]  Yoni Zazuetap 60 y.o. 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    I suspect that his symptoms are multifactorial in the context of his known esophageal malignancy, recent exposure to chemotherapy and electrolyte disturbances with hyperglycemia. He denies any symptoms of dysphagia and regurgitation of undigested food but cannot completely exclude malignant obstruction as cause for his symptoms as well.    I recommend symptomatic management with scheduled zofran to help with his nausea/vomiting. Can also consider ativan as well as for chemotherapy associated symptoms. If he does not improve despite symptomatic therapy, can consider whether palliative esophageal stenting may help but it is unclear to me whether this is the underlying issue. Will continue to follow and please contact us with any questions.    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    He reports receiving his first dose of chemotherapy this past week. He developed nausea with billous vomiting and watery diarrhea. He has not been able to  tolerate PO due to sensation of food/liquids getting stuck in his mid chest and upper abdomen. He denies regurgitating undigested food and emphasizes that he is vomiting bile.     His labs are notable for uptrending Cr to 2.49 with elevated BUN and acidosis. CBC also notable for mild leukopenia and drop in H/H to 11 from baseline 12-13. CT chest showed mild mural thickening of the distal third of the esophagus c/w known esophageal cancer with borderline periportal and upper retroperitoneal lymph nodes.     His last EGD was at the time of his diagnosis in May 2025 which showed a malignant mass in the lower third of the esophagus a/w BE. He had an EUS in June 2025 which showed T2N1 mass covering the entire circumference of the lower 1/3 of the esophagus extending to the muscularis propria with a small subCM node in the paraesophageal region.     He was unable to receive PET due to hyperglycemia but did have a staging CT in 5/2025 that was negative for distant disease.     In reviewing his medications, he is being treated with zofran, phenergan and reglan. At the time of this note at 3pm, he has received zofran 4 mg x 2, reglan 10 mg x 2.     REVIEW OF SYSTEMS:  10 point ROS otherwise negative unless mentioned above.    PAST MEDICAL/SURGICAL HISTORY:  Past Medical History[1]   Past Surgical History[2]    FAMILY/SOCIAL HISTORY:  Family History[3]  Social History[4]    Meds/Allergies     INPATIENT MEDICATIONS:     Medications Prior to Admission:     acetaminophen (TYLENOL) 500 mg tablet    amitriptyline (ELAVIL) 10 mg tablet    Blood Glucose Monitoring Suppl (ONE TOUCH ULTRA MINI) w/Device KIT    Blood Glucose Monitoring Suppl w/Device KIT    carvedilol (COREG) 25 mg tablet    dexamethasone (DECADRON) 4 mg tablet    Empagliflozin (Jardiance) 25 MG TABS    hydroCHLOROthiazide 12.5 mg tablet    insulin aspart (NovoLOG FlexPen) 100 UNIT/ML injection pen    Insulin Glargine Solostar (Lantus SoloStar) 100 UNIT/ML SOPN     Insulin Pen Needle (BD Pen Needle [place] U/F) 32G X 4 MM MISC    lisinopril-hydrochlorothiazide (PRINZIDE,ZESTORETIC) 20-12.5 MG per tablet    ondansetron (ZOFRAN-ODT) 8 mg disintegrating tablet    pantoprazole (PROTONIX) 40 mg tablet    traMADol (Ultram) 50 mg tablet    Ubrogepant (UBRELVY) 100 MG tablet    ammonium lactate (LAC-HYDRIN) 12 % lotion    [START ON 7/9/2025] fluorouracil 6,420 mg in CADD/Elastomeric Infusion Device    ketoconazole (NIZORAL) 2 % cream    lidocaine (Lidoderm) 5 %    lidocaine (LMX) 4 % cream    lidocaine-prilocaine (EMLA) cream    methocarbamol (ROBAXIN) 750 mg tablet    rivaroxaban (Xarelto Starter Pack) 15 & 20 MG starter pack    Objective   /64 (BP Location: Right arm)   Pulse 94   Temp 98.2 °F (36.8 °C) (Temporal)   Resp 18   Ht 6' (1.829 m)   Wt 121 kg (267 lb 3.2 oz)   SpO2 92%   BMI 36.24 kg/m²     PHYSICAL EXAM:    General: No acute distress  HEENT: No scleral icterus, normocephalic  Abdomen: Soft, non-distended  Extremities: No lower extremity edema  Skin: No jaundice  Neuro: Awake, alert, oriented x 3    Lab Results:   Lab Results   Component Value Date    WBC 3.18 (L) 07/06/2025    HGB 11.2 (L) 07/06/2025    HCT 35.6 (L) 07/06/2025    MCV 91 07/06/2025     07/06/2025     Lab Results   Component Value Date    SODIUM 132 (L) 07/06/2025    K 4.3 07/06/2025     07/06/2025    CO2 17 (L) 07/06/2025    AGAP 12 07/06/2025    BUN 54 (H) 07/06/2025    CREATININE 2.49 (H) 07/06/2025    GLUC 239 (H) 07/06/2025    GLUF 171 (H) 04/12/2025    CALCIUM 7.6 (L) 07/06/2025    AST 8 (L) 07/03/2025    ALT 7 07/03/2025    ALKPHOS 36 07/03/2025    TP 6.0 (L) 07/03/2025    TBILI 0.45 07/03/2025    EGFR 26 07/06/2025     Lab Results   Component Value Date    INR 1.12 06/03/2025    INR 1.14 05/31/2022    INR 1.11 03/29/2022    PROTIME 14.6 06/03/2025    PROTIME 14.3 05/31/2022    PROTIME 14.0 03/29/2022     Lab Results   Component Value Date    IRON 88 09/16/2022       Imaging  Studies: I have personally reviewed pertinent imaging studies.    XR chest 1 view portable  Result Date: 7/2/2025  Impression: No radiographic evidence of acute intrathoracic process on this examination which is somewhat limited by low lung volumes. Workstation performed: [id number]     CT chest without contrast  Result Date: 7/1/2025  Impression: No acute findings in the chest. Mild mural thickening of the distal esophagus in keeping with history of esophageal cancer. Borderline/minimally enlarged periportal and upper retroperitoneal lymph nodes, nonspecific, unchanged from previous examination. Computerized Assisted Algorithm (CAA) may have aided analysis of applicable images. Resident: SINGH LIZ I, the attending radiologist, have reviewed the images and agree with the final report above. Workstation performed: [id number]     EUS (6/2025)  Hypoechoic T2N1 mass was visualized, covering the entire circumference in the lower third of the esophagus, observed with the scope at 39 cm from incisors, extending to the muscularis propria with an indeterminate layer origin. Small 9 mm lymph node in the paraesophageal region.  The stomach and duodenum appeared normal.    EGD (5/2025)  Single malignant-appearing mass (traversable) in the lower third of the esophagus; performed cold forceps biopsy with partial removal. Extending from 37cm to 40cm in the esophagus covering at least 50% of the circumference, arising in what appears to be Butler's esophagus extending from 36cm to GEJ at 40cm.  C3M4 Butler's esophagus observed with an associated lesion  3 cm hiatal hernia - GE junction 40 cm from the incisors, diaphragmatic impression 43 cm from the incisors:  Hill classification: Grade I  The upper third of the esophagus and middle third of the esophagus appeared normal.  Edematous, erythematous and granular mucosa in the cardia, fundus of the stomach, body of the stomach, incisura and antrum; performed cold forceps biopsy  to rule out H. pylori  The duodenal bulb and 2nd part of the duodenum appeared normal.       [1]   Past Medical History:  Diagnosis Date    Allergic 1968    Seasonal    Arthritis 2012    Brain concussion Multiple    Cervical disc disease     Chronic kidney disease 2012    Chronic pain disorder     CTS (carpal tunnel syndrome) 2002    Diabetes mellitus (HCC)     Esophageal cancer (HCC)     Full dentures     only using upper    Head injury 1975    Headache(784.0) 1980    High blood sugar     Hypertension     Kidney stone     Liver disease     Low back pain     Cannot remember    Lumbar disc disease     Lumbosacral disc disease     Cannot remember    Migraines     Neuropathy in diabetes (HCC)     RLS (restless legs syndrome)     Skin cancer     in past    Thoracic disc disorder     Cannot remember    Visual impairment 2017    Cataracts. Surgery to correct in 2021   [2]   Past Surgical History:  Procedure Laterality Date    AMPUTATION  2022    Little toe left    CARPAL TUNNEL RELEASE  2002    CATARACT EXTRACTION Bilateral     COLONOSCOPY      CONTRACTURE Right 01/20/2025    Procedure: Right wrist and hand extensor tenolysis and wrist capsulotomy;  Surgeon: Carroll Mccarthy MD;  Location: [facility];  Service: Orthopedics    EYE SURGERY  2021    FOOT SURGERY  2022    Left Foot    FRACTURE SURGERY  1968    Right Tib/Fib    GANGLION CYST EXCISION Left 03/25/2024    Procedure: EXCISION MUCOID CYST, INDEX FINGER DIP;  Surgeon: Carroll Mccarthy MD;  [facility];  Service: Orthopedics    GANGLION CYST EXCISION Right 05/20/2024    Procedure: EXCISION MUCOID CYST - RIGHT INDEX AND MIDDLE FINGERS;  Surgeon: Carroll Mccarthy MD;  [facility];  Service: Orthopedics    HERNIA REPAIR      INCISION AND DRAINAGE  01/16/2024    IR NEPHROURETERAL ACCESS FOR UROLOGY PCNL  04/07/2025    IR PORT PLACEMENT  6/16/2025    KIDNEY SURGERY Right 06/2012    KNEE SURGERY Right 1980    MOUTH SURGERY      [facility]  AMPUTATION METATARSAL W/TOE SINGLE Left 6/1/2022    Procedure: RAY RESECTION FOOT;  Surgeon: Hannah Melgoza DPM;  [facility];  Service: Podiatry    CT INCISION & DRAINAGE ABSCESS COMPLICATED/MULTIPLE Right 09/17/2024    Procedure: Irrigation and debridement right wrist and hand, any indicated procedures;  Surgeon: Carroll Mccarthy MD;  [facility];  Service: Orthopedics    CT INCISION EXTENSOR TENDON SHEATH WRIST Right 01/20/2025    Procedure: RELEASE DEQUERVAINS - Right;  Surgeon: Carroll Mccarthy MD;  [facility];  Service: Orthopedics    CT PERQ NL/PL LITHOTRP COMPLEX >2 CM MLT LOCATIONS Right 04/11/2025    Procedure: NEPHROLITHOTOMY  PERCUTANEOUS (PCNL);  Surgeon: Adis Hamilton MD;  [facility];  Service: Urology    CT RPR AA HERNIA 1ST < 3 CM REDUCIBLE N/A 7/14/2023    Procedure: REPAIR HERNIA INCISIONAL;  Surgeon: Matt Melo MD;  [facility];  Service: General    CT SYNOVECTOMY EXTENSOR TENDON SHTH WRIST 1 CMPRT Right 10/03/2024    Procedure: Irrigation and debridement, right wrist, volar and dorsal, possible extensor and flexor tenosynovectomy, any indicated procedures;  Surgeon: Carroll Mccarthy MD;  [facility];  Service: Orthopedics    CT SYNOVECTOMY EXTENSOR TENDON SHTH WRIST 1 CMPRT Right 03/24/2025    Procedure: SYNOVECTOMY / DEBRIDEMENT - FLEXOR CARPI RADIALIS AND FLEXOR CARPI ULNARIS TENDONS- RIGHT;  Surgeon: Carroll Mccarthy MD;  [facility];  Service: Orthopedics    TONSILLECTOMY  1968    Yes    WOUND DEBRIDEMENT Left 4/28/2022    Procedure: Left foot washout;  Surgeon: Hannah Melgoza DPM;  [facility];  Service: Podiatry    WOUND DEBRIDEMENT Left 6/6/2022    Procedure: DEBRIDEMENT WOUND (WASH OUT);  Surgeon: Hannah Melgoza DPM;  [facility];  Service: Podiatry   [3]   Family History  Problem Relation Name Age of Onset    Diabetes Paternal Grandmother Michelle     Diabetes Paternal  Grandfather Maurice     Arthritis Maternal Grandmother Greatgrandmother  Corie    [4]   Social History  Tobacco Use    Smoking status: Never    Smokeless tobacco: Never   Vaping Use    Vaping status: Never Used   Substance Use Topics    Alcohol use: Yes     Alcohol/week: 0.0 - 1.0 standard drinks of alcohol     Comment: ocassional    Drug use: Never

## 2025-07-06 NOTE — PLAN OF CARE
Problem: PAIN - ADULT  Goal: Verbalizes/displays adequate comfort level or baseline comfort level  Description: Interventions:  - Encourage patient to monitor pain and request assistance  - Assess pain using appropriate pain scale  - Administer analgesics as ordered based on type and severity of pain and evaluate response  - Implement non-pharmacological measures as appropriate and evaluate response  - Consider cultural and social influences on pain and pain management  - Notify physician/advanced practitioner if interventions unsuccessful or patient reports new pain  - Educate patient/family on pain management process including their role and importance of  reporting pain   - Provide non-pharmacologic/complimentary pain relief interventions  Outcome: Progressing     Problem: METABOLIC, FLUID AND ELECTROLYTES - ADULT  Goal: Electrolytes maintained within normal limits  Description: INTERVENTIONS:  - Monitor labs and assess patient for signs and symptoms of electrolyte imbalances  - Administer electrolyte replacement as ordered  - Monitor response to electrolyte replacements, including repeat lab results as appropriate  - Instruct patient on fluid and nutrition as appropriate  Outcome: Progressing     Problem: GENITOURINARY - ADULT  Goal: Absence of urinary retention  Description: INTERVENTIONS:  - Assess patient’s ability to void and empty bladder  - Monitor I/O  - Bladder scan as needed  - Discuss with physician/AP medications to alleviate retention as needed  - Discuss catheterization for long term situations as appropriate  Outcome: Progressing

## 2025-07-06 NOTE — ASSESSMENT & PLAN NOTE
Lab Results   Component Value Date    HGBA1C 10.4 (H) 04/06/2025       Recent Labs     07/05/25  1100 07/05/25  1604 07/05/25 2028 07/06/25  0534   POCGLU 205* 236* 256* 221*       Blood Sugar Average: Last 72 hrs:  (P) [id number]    Uncontrolled in setting of recent steroid therapy  Patient follows with endocrinology  Currently prescribed Lantus 30 units, holding Humalog with meals due to poor oral intake,  Jardiance -  Patient presents with VIVIANA, p.o. intolerance with elevated beta hydroxybutyrate, mildly increased anion gap, however normal carbon bicarbonate, therefore do not suspect DKA  Initially marked hyperglycemia requiring insulin drip, now transitioned back to subcutaneous insulin as above  Due to patient's diet intolerance(patient reports not being able to eat any solid food and attempts to drink Glucerna diluted with milk, but reports a lot of discomfort during that), will liberate diet to regular and provide with different regular supplements to improve tolerance.

## 2025-07-06 NOTE — ASSESSMENT & PLAN NOTE
-- Sodium 132 today has been chronic since February  --Monitor with Isolyte discontinue normal saline  --Discontinue hydrochlorothiazide and avoid going forward in the future

## 2025-07-07 ENCOUNTER — APPOINTMENT (INPATIENT)
Dept: ULTRASOUND IMAGING | Facility: HOSPITAL | Age: 60
DRG: 682 | End: 2025-07-07
Payer: MEDICARE

## 2025-07-07 PROBLEM — R11.2 NAUSEA VOMITING AND DIARRHEA: Status: ACTIVE | Noted: 2025-07-07

## 2025-07-07 PROBLEM — R19.7 NAUSEA VOMITING AND DIARRHEA: Status: ACTIVE | Noted: 2025-07-07

## 2025-07-07 LAB
ALBUMIN SERPL BCG-MCNC: 2.7 G/DL (ref 3.5–5)
ANION GAP SERPL CALCULATED.3IONS-SCNC: 11 MMOL/L (ref 4–13)
BUN SERPL-MCNC: 70 MG/DL (ref 5–25)
CALCIUM ALBUM COR SERPL-MCNC: 8.4 MG/DL (ref 8.3–10.1)
CALCIUM SERPL-MCNC: 7.4 MG/DL (ref 8.4–10.2)
CHLORIDE SERPL-SCNC: 102 MMOL/L (ref 96–108)
CO2 SERPL-SCNC: 18 MMOL/L (ref 21–32)
CREAT SERPL-MCNC: 3.98 MG/DL (ref 0.6–1.3)
GFR SERPL CREATININE-BSD FRML MDRD: 15 ML/MIN/1.73SQ M
GLUCOSE SERPL-MCNC: 160 MG/DL (ref 65–140)
GLUCOSE SERPL-MCNC: 170 MG/DL (ref 65–140)
GLUCOSE SERPL-MCNC: 177 MG/DL (ref 65–140)
GLUCOSE SERPL-MCNC: 187 MG/DL (ref 65–140)
GLUCOSE SERPL-MCNC: 192 MG/DL (ref 65–140)
MAGNESIUM SERPL-MCNC: 2.2 MG/DL (ref 1.9–2.7)
PHOSPHATE SERPL-MCNC: 3.6 MG/DL (ref 2.3–4.1)
POTASSIUM SERPL-SCNC: 3.9 MMOL/L (ref 3.5–5.3)
SODIUM SERPL-SCNC: 131 MMOL/L (ref 135–147)
URATE SERPL-MCNC: 11.2 MG/DL (ref 3.5–8.5)

## 2025-07-07 PROCEDURE — 80069 RENAL FUNCTION PANEL: CPT | Performed by: INTERNAL MEDICINE

## 2025-07-07 PROCEDURE — 82948 REAGENT STRIP/BLOOD GLUCOSE: CPT

## 2025-07-07 PROCEDURE — 99233 SBSQ HOSP IP/OBS HIGH 50: CPT

## 2025-07-07 PROCEDURE — 99233 SBSQ HOSP IP/OBS HIGH 50: CPT | Performed by: INTERNAL MEDICINE

## 2025-07-07 PROCEDURE — NC001 PR NO CHARGE: Performed by: INTERNAL MEDICINE

## 2025-07-07 PROCEDURE — 99232 SBSQ HOSP IP/OBS MODERATE 35: CPT | Performed by: STUDENT IN AN ORGANIZED HEALTH CARE EDUCATION/TRAINING PROGRAM

## 2025-07-07 PROCEDURE — 76770 US EXAM ABDO BACK WALL COMP: CPT

## 2025-07-07 PROCEDURE — 83735 ASSAY OF MAGNESIUM: CPT | Performed by: INTERNAL MEDICINE

## 2025-07-07 PROCEDURE — 84550 ASSAY OF BLOOD/URIC ACID: CPT | Performed by: INTERNAL MEDICINE

## 2025-07-07 RX ORDER — HYDROCORTISONE 25 MG/G
CREAM TOPICAL 4 TIMES DAILY PRN
Status: CANCELLED | OUTPATIENT
Start: 2025-07-07

## 2025-07-07 RX ORDER — INSULIN LISPRO 100 [IU]/ML
1-6 INJECTION, SOLUTION INTRAVENOUS; SUBCUTANEOUS
Status: CANCELLED | OUTPATIENT
Start: 2025-07-08

## 2025-07-07 RX ORDER — METHOCARBAMOL 750 MG/1
750 TABLET, FILM COATED ORAL EVERY 6 HOURS PRN
Status: CANCELLED | OUTPATIENT
Start: 2025-07-07

## 2025-07-07 RX ORDER — SODIUM BICARBONATE 650 MG/1
325 TABLET ORAL
Status: DISCONTINUED | OUTPATIENT
Start: 2025-07-07 | End: 2025-07-07

## 2025-07-07 RX ORDER — AMITRIPTYLINE HYDROCHLORIDE 10 MG/1
30 TABLET ORAL
Status: CANCELLED | OUTPATIENT
Start: 2025-07-07

## 2025-07-07 RX ORDER — AMMONIUM LACTATE 12 G/100G
LOTION TOPICAL 2 TIMES DAILY PRN
Status: CANCELLED | OUTPATIENT
Start: 2025-07-07

## 2025-07-07 RX ORDER — LIDOCAINE 50 MG/G
1 PATCH TOPICAL DAILY
Status: CANCELLED | OUTPATIENT
Start: 2025-07-08

## 2025-07-07 RX ORDER — LOPERAMIDE HYDROCHLORIDE 2 MG/1
2 CAPSULE ORAL 4 TIMES DAILY PRN
Status: CANCELLED | OUTPATIENT
Start: 2025-07-07

## 2025-07-07 RX ORDER — METOCLOPRAMIDE HYDROCHLORIDE 5 MG/ML
10 INJECTION INTRAMUSCULAR; INTRAVENOUS EVERY 6 HOURS PRN
Status: CANCELLED | OUTPATIENT
Start: 2025-07-07

## 2025-07-07 RX ORDER — TRAMADOL HYDROCHLORIDE 50 MG/1
100 TABLET ORAL
Status: CANCELLED | OUTPATIENT
Start: 2025-07-07

## 2025-07-07 RX ORDER — HYDROCHLOROTHIAZIDE 12.5 MG/1
12.5 TABLET ORAL 2 TIMES DAILY
Status: CANCELLED | OUTPATIENT
Start: 2025-07-08

## 2025-07-07 RX ORDER — ALBUMIN (HUMAN) 12.5 G/50ML
25 SOLUTION INTRAVENOUS EVERY 6 HOURS
Status: COMPLETED | OUTPATIENT
Start: 2025-07-07 | End: 2025-07-07

## 2025-07-07 RX ORDER — PANTOPRAZOLE SODIUM 40 MG/1
40 TABLET, DELAYED RELEASE ORAL
Status: CANCELLED | OUTPATIENT
Start: 2025-07-08

## 2025-07-07 RX ORDER — MIRTAZAPINE 7.5 MG/1
7.5 TABLET, FILM COATED ORAL
Status: CANCELLED | OUTPATIENT
Start: 2025-07-07

## 2025-07-07 RX ORDER — INSULIN LISPRO 100 [IU]/ML
1-5 INJECTION, SOLUTION INTRAVENOUS; SUBCUTANEOUS
Status: CANCELLED | OUTPATIENT
Start: 2025-07-07

## 2025-07-07 RX ORDER — ACETAMINOPHEN 325 MG/1
650 TABLET ORAL EVERY 6 HOURS PRN
Status: CANCELLED | OUTPATIENT
Start: 2025-07-07

## 2025-07-07 RX ORDER — TAMSULOSIN HYDROCHLORIDE 0.4 MG/1
0.4 CAPSULE ORAL
Status: CANCELLED | OUTPATIENT
Start: 2025-07-08

## 2025-07-07 RX ORDER — INSULIN GLARGINE 100 [IU]/ML
30 INJECTION, SOLUTION SUBCUTANEOUS
Status: CANCELLED | OUTPATIENT
Start: 2025-07-07

## 2025-07-07 RX ORDER — SODIUM CHLORIDE 9 MG/ML
75 INJECTION, SOLUTION INTRAVENOUS CONTINUOUS
Status: DISCONTINUED | OUTPATIENT
Start: 2025-07-07 | End: 2025-07-07

## 2025-07-07 RX ORDER — HYDROCORTISONE ACETATE 25 MG/1
25 SUPPOSITORY RECTAL 2 TIMES DAILY
Status: CANCELLED | OUTPATIENT
Start: 2025-07-08

## 2025-07-07 RX ORDER — PROMETHAZINE HYDROCHLORIDE 25 MG/ML
25 INJECTION, SOLUTION INTRAMUSCULAR; INTRAVENOUS EVERY 6 HOURS PRN
Status: CANCELLED | OUTPATIENT
Start: 2025-07-07

## 2025-07-07 RX ADMIN — INSULIN LISPRO 1 UNITS: 100 INJECTION, SOLUTION INTRAVENOUS; SUBCUTANEOUS at 21:45

## 2025-07-07 RX ADMIN — TAMSULOSIN HYDROCHLORIDE 0.4 MG: 0.4 CAPSULE ORAL at 17:18

## 2025-07-07 RX ADMIN — ONDANSETRON 8 MG: 2 INJECTION INTRAMUSCULAR; INTRAVENOUS at 09:02

## 2025-07-07 RX ADMIN — INSULIN LISPRO 1 UNITS: 100 INJECTION, SOLUTION INTRAVENOUS; SUBCUTANEOUS at 06:35

## 2025-07-07 RX ADMIN — PANTOPRAZOLE SODIUM 40 MG: 40 TABLET, DELAYED RELEASE ORAL at 17:18

## 2025-07-07 RX ADMIN — ALBUMIN (HUMAN) 25 G: 0.25 INJECTION, SOLUTION INTRAVENOUS at 12:23

## 2025-07-07 RX ADMIN — LIDOCAINE 5% 1 PATCH: 700 PATCH TOPICAL at 08:20

## 2025-07-07 RX ADMIN — ALBUMIN (HUMAN) 25 G: 0.25 INJECTION, SOLUTION INTRAVENOUS at 17:19

## 2025-07-07 RX ADMIN — MORPHINE SULFATE 2 MG: 2 INJECTION, SOLUTION INTRAMUSCULAR; INTRAVENOUS at 09:43

## 2025-07-07 RX ADMIN — RIVAROXABAN 15 MG: 10 TABLET, FILM COATED ORAL at 08:20

## 2025-07-07 RX ADMIN — MORPHINE SULFATE 2 MG: 2 INJECTION, SOLUTION INTRAMUSCULAR; INTRAVENOUS at 17:52

## 2025-07-07 RX ADMIN — INSULIN GLARGINE 30 UNITS: 100 INJECTION, SOLUTION SUBCUTANEOUS at 21:44

## 2025-07-07 RX ADMIN — INSULIN LISPRO 2 UNITS: 100 INJECTION, SOLUTION INTRAVENOUS; SUBCUTANEOUS at 12:09

## 2025-07-07 RX ADMIN — SODIUM BICARBONATE 650 MG TABLET 325 MG: at 09:39

## 2025-07-07 RX ADMIN — ONDANSETRON 8 MG: 2 INJECTION INTRAMUSCULAR; INTRAVENOUS at 04:16

## 2025-07-07 RX ADMIN — METHOCARBAMOL TABLETS 1500 MG: 750 TABLET, COATED ORAL at 06:41

## 2025-07-07 RX ADMIN — AMITRIPTYLINE HYDROCHLORIDE 30 MG: 10 TABLET, FILM COATED ORAL at 21:47

## 2025-07-07 RX ADMIN — MIRTAZAPINE 7.5 MG: 7.5 TABLET, FILM COATED ORAL at 21:47

## 2025-07-07 RX ADMIN — TRAMADOL HYDROCHLORIDE 100 MG: 50 TABLET, COATED ORAL at 21:47

## 2025-07-07 RX ADMIN — PANTOPRAZOLE SODIUM 40 MG: 40 TABLET, DELAYED RELEASE ORAL at 06:08

## 2025-07-07 RX ADMIN — SODIUM BICARBONATE: 84 INJECTION INTRAVENOUS at 13:35

## 2025-07-07 RX ADMIN — INSULIN LISPRO 1 UNITS: 100 INJECTION, SOLUTION INTRAVENOUS; SUBCUTANEOUS at 17:18

## 2025-07-07 NOTE — ASSESSMENT & PLAN NOTE
Lab Results   Component Value Date    EGFR 15 07/07/2025    EGFR 26 07/06/2025    EGFR 34 07/05/2025    CREATININE 3.98 (H) 07/07/2025    CREATININE 2.49 (H) 07/06/2025    CREATININE 2.01 (H) 07/05/2025   Baseline creatinine 1.4-1.8, presents with severe VIVIANA with creatinine of 2.97  Current creatinine worsened   Apology on board  Please see above under acute kidney injury

## 2025-07-07 NOTE — PROGRESS NOTES
"Discussed at bedside with patient about hemodialysis.  There is no emergent need at this time for hemodialysis.  Patient is not having uremic symptoms, electrolytes stable, still making urine.  Explained risks and benefits of treatments. Patient was calm and cooperative with discussion. Patient understands.  Per patient, \" if it is the last case scenario\", will possibly consider HD. However, at this time, would like more time to think about HD as well as the entire picture at this time. Patient did not sign consent. Patient agreeable to reevaluate HD tomorrow.  HD consent form placed in patient folder at the main desk on 7 floor. Explained the importance the being cooperative with current in patient treatment. Questions answered. Understands to reach to staff to contact Nephrology Dept if any other questions arise.   "

## 2025-07-07 NOTE — ASSESSMENT & PLAN NOTE
-- Bicarbonate level is dropped to 17 no anion gap  -- Patient refused IVF overnight as he states they are making his GI symptoms worse.   -- If no improvement in bicarb level, recommend starting oral bicarb tablets.  Unless patient be willing to be placed on bicarb gtt

## 2025-07-07 NOTE — PROGRESS NOTES
Progress Note - Hospitalist   Name: Yoni Castillo 60 y.o. male I MRN: [id number]  Unit/Bed#: 7T [facility] 706-01 I Date of Admission: 7/1/2025   Date of Service: 7/7/2025 I Hospital Day: 6    Assessment & Plan  Acute kidney injury superimposed on stage 3a chronic kidney disease (HCC)  This is a 60-year-old male patient with history of recently diagnosed esophageal cancer in May 2025, diabetes, recently diagnosed acute DVT, hypertension who started chemotherapy 6/26 and presents with p.o. intolerance, nausea, vomiting and diarrhea.  Found to have acute kidney injury with creatinine of 2.97 with baseline creatinine 1.3-1.8    Initially associated with hypotension responded to 2 L normal saline boluses  VIVIANA secondary to prerenal causes due to GI losses in setting of chemotherapy, urinary retention may be contributing  Creatinine is trending up  Patient noted for urinary retention but is declining catheterization, continue to monitor  On board currently patient on gentle hydration as per nephrology recommendation  Monitor BMP, I's and O's closely      Lab Results   Component Value Date    EGFR 15 07/07/2025    EGFR 26 07/06/2025    EGFR 34 07/05/2025    CREATININE 3.98 (H) 07/07/2025    CREATININE 2.49 (H) 07/06/2025    CREATININE 2.01 (H) 07/05/2025     Essential hypertension  Patient presents with volume depletion, VIVIANA, hyponatremia  Hold lisinopril/hydrochlorothiazide/empagliflozin.    Type 2 diabetes mellitus with chronic kidney disease, without long-term current use of insulin ([facility])  Lab Results   Component Value Date    HGBA1C 10.4 (H) 04/06/2025       Recent Labs     07/06/25 2024 07/07/25  0623 07/07/25  1128 07/07/25  1514   POCGLU 206* 170* 192* 187*       Blood Sugar Average: Last 72 hrs:  (P) [id number]    Uncontrolled in setting of recent steroid therapy  Patient follows with endocrinology  Currently prescribed Lantus 30 units  Initially marked hyperglycemia requiring insulin drip, now transitioned  back to subcutaneous insulin as above  Due to patient's diet intolerance(patient reports not being able to eat any solid food and attempts to drink Glucerna diluted with milk, but reports a lot of discomfort during that), will liberate diet to regular and provide with different regular supplements to improve tolerance.    Chronic kidney disease, stage 3 (HCC)  Lab Results   Component Value Date    EGFR 15 07/07/2025    EGFR 26 07/06/2025    EGFR 34 07/05/2025    CREATININE 3.98 (H) 07/07/2025    CREATININE 2.49 (H) 07/06/2025    CREATININE 2.01 (H) 07/05/2025   Baseline creatinine 1.4-1.8, presents with severe VIVIANA with creatinine of 2.97  Current creatinine worsened   Apology on board  Please see above under acute kidney injury  Opioid dependence, uncomplicated (HCC)  Maintained on tramadol -was held due to VIVIANA, however patient requests that it is resumed, 100 mg at bedtime  Discontinue oxycodone  Hyponatremia  Hypovolemic as well as pseudohyponatremia in setting of hyperglycemia  Sodium corrects to 133  Stable   Malignant neoplasm of lower third of esophagus (HCC)  Follows with heme-onc Dr. Novak  On chemotherapy with fluorouracil, leucovorin, oxaliplatin, docetaxel every 14 days, completed 6/26, next cycle scheduled for 7/9  Patient reports of difficulty tolerating diet due to abdominal discomfort and nausea  No swallowing difficulties reported  GI has seen the patient in May 2025.  Reconsulted GI appreciate recs    - Had a detailed discussion with gastroenterology along with oncology and surgical oncology via secure chat.  Given patient's decreased oral intake, acute kidney injuries, other routes for nutrition supplementations were discussed with recommendations to transfer patient to [facility] for surgical oncology and oncology to evaluate the patient for for possible G-tube placement.  -Patient is in agreement with transfer  - Patient has been accepted awaiting bed and then transfer to  [place]    Deep vein thrombosis (DVT) of femoral vein of left lower extremity (HCC)  In setting of hypercoagulability of malignancy  Prescribed Xarelto for 6 months therapy, however has not been taking due to misunderstanding  Continue with after discussion with GI      Urinary retention  Associated with VIVIANA  Patient has declined catheterization  Continue to monitor PVR, encourage ambulation  Monitor BMP  Consider in versus short-term outpatient urology evaluation  Started Flomax, but patient refused it  Pancytopenia (HCC)  Mild  WBC trending down, continue to monitor  Likely chemotherapy induced  Monitor CBC  Also, mild drop in Hgb  Close monitoring due to reports of rectal bleeding  Low bicarbonate level  Urology on board on IV fluids with bicarb until hydration.  Nausea vomiting and diarrhea  Improving somewhat    VTE Pharmacologic Prophylaxis: VTE Score: 5 Xarelto    Mobility:   Basic Mobility Inpatient Raw Score: 23  JH-HLM Goal: 7: Walk 25 feet or more  JH-HLM Achieved: 7: Walk 25 feet or more      Education and Discussions with Family / Patient: Patient declined call to .     Current Length of Stay: 6 day(s)  Current Patient Status: Inpatient     Discharge Plan: Awaiting transfer to another [facility]    Code Status: Level 1 - Full Code    Subjective   Patient was seen and examined at bedside.  Patient was slightly agitated that his main concern of decreased p.o take has not been addressed.  Discussed multiple options and agreed with transfer to [place] for possible G-tube placement.  Patient very grateful.  No other concerns.    Objective :  Temp:  [97.6 °F (36.4 °C)-98 °F (36.7 °C)] 97.8 °F (36.6 °C)  HR:  [72-93] 72  BP: (104-111)/(55-71) 111/69  Resp:  [20] 20  SpO2:  [93 %-95 %] 93 %  O2 Device: None (Room air)    Body mass index is 36.24 kg/m².     Input and Output Summary (last 24 hours):     Intake/Output Summary (Last 24 hours) at 7/7/2025 8177  Last data filed at 7/7/2025  1253  Gross per 24 hour   Intake 2581 ml   Output 1250 ml   Net 1331 ml       Physical Exam  Vitals and nursing note reviewed.   Constitutional:       General: He is not in acute distress.     Appearance: He is well-developed. He is obese.   HENT:      Head: Normocephalic and atraumatic.     Eyes:      Conjunctiva/sclera: Conjunctivae normal.       Cardiovascular:      Rate and Rhythm: Normal rate and regular rhythm.      Heart sounds: No murmur heard.  Pulmonary:      Effort: Pulmonary effort is normal. No respiratory distress.      Breath sounds: Normal breath sounds.   Abdominal:      Palpations: Abdomen is soft.      Tenderness: There is no abdominal tenderness.     Musculoskeletal:         General: No swelling.      Cervical back: Neck supple.     Skin:     General: Skin is warm and dry.      Capillary Refill: Capillary refill takes less than 2 seconds.     Neurological:      Mental Status: He is alert and oriented to person, place, and time.     Psychiatric:         Mood and Affect: Mood normal.           Lines/Drains:                   Lab Results: I have reviewed the following results:   Results from last 7 days   Lab Units 07/06/25  0541 07/03/25  0459 07/02/25  0457   WBC Thousand/uL 3.18*   < > 2.89*   HEMOGLOBIN g/dL 11.2*   < > 11.9*   HEMATOCRIT % 35.6*   < > 37.8   PLATELETS Thousands/uL 155   < > 129*   BANDS PCT % 16*   < >  --    SEGS PCT %  --   --  65   LYMPHO PCT % 14   < > 24   MONO PCT % 25*   < > 5   EOS PCT % 0   < > 5    < > = values in this interval not displayed.     Results from last 7 days   Lab Units 07/07/25  0554 07/04/25  0501 07/03/25  0459   SODIUM mmol/L 131*   < > 133*   POTASSIUM mmol/L 3.9   < > 4.4   CHLORIDE mmol/L 102   < > 104   CO2 mmol/L 18*   < > 20*   BUN mg/dL 70*   < > 65*   CREATININE mg/dL 3.98*   < > 1.94*   ANION GAP mmol/L 11   < > 9   CALCIUM mg/dL 7.4*   < > 7.7*   ALBUMIN g/dL 2.7*  --  3.4*   TOTAL BILIRUBIN mg/dL  --   --  0.45   ALK PHOS U/L  --   --  36    ALT U/L  --   --  7   AST U/L  --   --  8*   GLUCOSE RANDOM mg/dL 177*   < > 206*    < > = values in this interval not displayed.         Results from last 7 days   Lab Units 07/07/25  1514 07/07/25  1128 07/07/25  0623 07/06/25 2024 07/06/25  1602 07/06/25  1021 07/06/25  0534 07/05/25 2028 07/05/25  1604 07/05/25  1100 07/05/25  0559 07/04/25 2031   POC GLUCOSE mg/dl 187* 192* 170* 206* 231* 213* 221* 256* 236* 205* 194* 182*               Recent Cultures (last 7 days):   Results from last 7 days   Lab Units 07/02/25  1826   C DIFF TOXIN B BY PCR  Negative       XR chest 1 view portable  Result Date: 7/2/2025  Impression: No radiographic evidence of acute intrathoracic process on this examination which is somewhat limited by low lung volumes. Workstation performed: [id number]     CT chest without contrast  Result Date: 7/1/2025  Impression: No acute findings in the chest. Mild mural thickening of the distal esophagus in keeping with history of esophageal cancer. Borderline/minimally enlarged periportal and upper retroperitoneal lymph nodes, nonspecific, unchanged from previous examination. Computerized Assisted Algorithm (CAA) may have aided analysis of applicable images. Resident: SINGH LIZ I, the attending radiologist, have reviewed the images and agree with the final report above. Workstation performed: [id number]         Last 24 Hours Medication List:     Current Facility-Administered Medications:     acetaminophen (TYLENOL) tablet 650 mg, Q6H PRN    albumin human (FLEXBUMIN) 25 % injection 25 g, Q6H    amitriptyline (ELAVIL) tablet 30 mg, HS    ammonium lactate (LAC-HYDRIN) 12 % lotion, BID PRN    [Held by provider] Empagliflozin TABS 25 mg, Daily    [Held by provider] hydroCHLOROthiazide tablet 12.5 mg, BID    hydrocortisone (ANUSOL-HC) 2.5 % rectal cream, 4x Daily PRN    hydrocortisone (ANUSOL-HC) rectal suppository 25 mg, BID    insulin glargine (LANTUS) subcutaneous injection 30 Units 0.3 mL,  HS    insulin lispro (HumALOG/ADMELOG) 100 units/mL subcutaneous injection 1-5 Units, HS    insulin lispro (HumALOG/ADMELOG) 100 units/mL subcutaneous injection 1-6 Units, TID AC **AND** Fingerstick Glucose (POCT), TID AC    lidocaine (LIDODERM) 5 % patch 1 patch, Daily    loperamide (IMODIUM) capsule 2 mg, 4x Daily PRN    methocarbamol (ROBAXIN) tablet 1,500 mg, Q6H PRN    metoclopramide (REGLAN) injection 10 mg, Q6H PRN    mirtazapine (REMERON) tablet 7.5 mg, HS    morphine injection 2 mg, Q4H PRN    pantoprazole (PROTONIX) EC tablet 40 mg, BID AC    promethazine (PHENERGAN) injection 25 mg, Q6H PRN    rimegepant sulfate (NURTEC) disintegrating tablet 75 mg, Daily PRN    rivaroxaban (XARELTO) tablet 15 mg, Daily With Breakfast    sodium bicarbonate 150 mEq in dextrose 5 % 1,000 mL infusion, Continuous, Last Rate: 50 mL/hr at 07/07/25 1335    tamsulosin (FLOMAX) capsule 0.4 mg, Daily With Dinner    traMADol (ULTRAM) tablet 100 mg, HS    Administrative Statements   Today, Patient Was Seen By: Margaret Arroyo MD  I have spent a total time of >35 minutes in caring for this patient on the day of the visit/encounter including Prognosis, Patient and family education, Risk factor reductions, Counseling / Coordination of care, Documenting in the medical record, Obtaining or reviewing history  , and Communicating with other healthcare professionals .    **Please Note: This note may have been constructed using a voice recognition system.**

## 2025-07-07 NOTE — ASSESSMENT & PLAN NOTE
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  Symptoms are severe and causing VIVIANA.     Suspect that patient's symptoms are likely due to chemotherapy related toxicity.  Unfortunately patient's symptoms persist despite symptomatic management.   Oncology is following who has also reached out to surgical oncology at [facility].  They are planning to transfer patient to [place] for further oncology and surgical oncology evaluation.  From a GI standpoint, it is unclear if an esophageal stent would be of benefit since patient is not having severe/significant dysphagia.  Recommend ongoing supportive care and management per oncology and surgical oncology for chemotherapy related toxicity.  Can consider GI advanced endoscopy consultation at [facility] for further evaluation/input on esophageal stent if necessary    Any questions or concerns please do not hesitate to reach out.  Thank you so much for involving us in this patient's care.

## 2025-07-07 NOTE — ASSESSMENT & PLAN NOTE
-- Blood pressure soft, currently 104/71  -- refuses IV plasmaLyte, encouraged oral intake/fluid  --Hold lisinopril and hydrochlorothiazide  --Avoid fluctuations in blood pressure

## 2025-07-07 NOTE — ASSESSMENT & PLAN NOTE
-- Present on admission, admission creatinine 2.97 mg/dL.  --Current creatinine 3.98. Increased from yesterday at 2.49  --Last outpatient creatinine was 1.84 mg per  --Patient with underlying chronic kidney disease stage III  --During the first hospitalization renal function did improve back to baseline on July 4 to 1.6 mg/dL, now worsening up to 2.49 mg/dL  --Patient has been having some soft blood pressure with poor oral intake along with nausea.  --Hold SGLT2 inhibitor, HCTZ due to acute kidney injury  --Hold lisinopril as you currently  --UA: hyaline casts along with a few granular casts which could signify early ATN.  -- Previously noted to be on normal saline, was switched to Plasma-Lyte on 7/6.  However, overnight refusing continuation of IV fluid (plasmalyte).  As per patient, worsening his GI symptoms. Continues with N/V.   --Blood pressure a little soft  --Magnesium and Phosphorus stable   --UO: 1.2 L - would continue bladder scan/retention protocol as previous hx of retention.   --Discussed with SLIM - patient noted to be refusing care/medications  --Recommend checking a renal ultrasound with noted creatinine increase  --Encouraged oral intake/oral fluids as patient does not wish to have IV fluids on at this time. Per nursing staff refuses Plasmalyte, only wants normal saline. Will start on gentle IVF at this time for 10 hours d/t poor oral intake + GI loss   -- Per primary team, further options like PEG tube placement and he said that he would like gastroenterology and hematology oncology to have a discussion with him and then he can decide and we can go ahead with placement of PEG tube hospitalization.

## 2025-07-07 NOTE — ASSESSMENT & PLAN NOTE
In setting of hypercoagulability of malignancy  Prescribed Xarelto for 6 months therapy, however has not been taking due to misunderstanding  Continue with after discussion with GI

## 2025-07-07 NOTE — ASSESSMENT & PLAN NOTE
This is a 60-year-old male patient with history of recently diagnosed esophageal cancer in May 2025, diabetes, recently diagnosed acute DVT, hypertension who started chemotherapy 6/26 and presents with p.o. intolerance, nausea, vomiting and diarrhea.  Found to have acute kidney injury with creatinine of 2.97 with baseline creatinine 1.3-1.8    Initially associated with hypotension responded to 2 L normal saline boluses  VIVIANA secondary to prerenal causes due to GI losses in setting of chemotherapy, urinary retention may be contributing  Creatinine is trending up  Patient noted for urinary retention but is declining catheterization, continue to monitor  On board currently patient on gentle hydration as per nephrology recommendation  Monitor BMP, I's and O's closely      Lab Results   Component Value Date    EGFR 15 07/07/2025    EGFR 26 07/06/2025    EGFR 34 07/05/2025    CREATININE 3.98 (H) 07/07/2025    CREATININE 2.49 (H) 07/06/2025    CREATININE 2.01 (H) 07/05/2025

## 2025-07-07 NOTE — ASSESSMENT & PLAN NOTE
Follows with heme-onc Dr. Novak  On chemotherapy with fluorouracil, leucovorin, oxaliplatin, docetaxel every 14 days, completed 6/26, next cycle scheduled for 7/9  Patient reports of difficulty tolerating diet due to abdominal discomfort and nausea  No swallowing difficulties reported  GI has seen the patient in May 2025.  Reconsulted GI appreciate recs    - Had a detailed discussion with gastroenterology along with oncology and surgical oncology via secure chat.  Given patient's decreased oral intake, acute kidney injuries, other routes for nutrition supplementations were discussed with recommendations to transfer patient to [facility] for surgical oncology and oncology to evaluate the patient for for possible G-tube placement.  -Patient is in agreement with transfer  - Patient has been accepted awaiting bed and then transfer to [place]

## 2025-07-07 NOTE — ASSESSMENT & PLAN NOTE
As above.    Abdomen soft, non-tender and non-distended without organomegaly or masses. Normal bowel sounds.

## 2025-07-07 NOTE — NURSING NOTE
IVF Isolyte at 75/hr infusing. Patient thinks and feels IVF causes his GI symptoms worst, wants it discontinued. Explained its importance, but still refuses. MD on call made aware, advised to document refusal. Slept fairly well through the night.

## 2025-07-07 NOTE — ASSESSMENT & PLAN NOTE
Lab Results   Component Value Date    HGBA1C 10.4 (H) 04/06/2025   -- Poorly controlled hemoglobin A1c 10.4  -- Hold SGLT2 inhibitor at this time due to volume depletion and VIVIANA    Recent Labs     07/06/25  1021 07/06/25  1602 07/06/25 2024 07/07/25  0623   POCGLU 213* 231* 206* 170*

## 2025-07-07 NOTE — PROGRESS NOTES
Progress Note - Nephrology   Name: Yoni Castillo 60 y.o. male I MRN: [id number]  Unit/Bed#: 7T [facility] 706-01 I Date of Admission: 7/1/2025   Date of Service: 7/7/2025 I Hospital Day: 6    Assessment & Plan  VIVIANA (acute kidney injury) (HCC)  -- Present on admission, admission creatinine 2.97 mg/dL.  --Current creatinine 3.98. Increased from yesterday at 2.49  --Last outpatient creatinine was 1.84 mg per  --Patient with underlying chronic kidney disease stage III  --During the first hospitalization renal function did improve back to baseline on July 4 to 1.6 mg/dL, now worsening up to 2.49 mg/dL  --Patient has been having some soft blood pressure with poor oral intake along with nausea.  --Hold SGLT2 inhibitor, HCTZ due to acute kidney injury  --Hold lisinopril as you currently  --UA: hyaline casts along with a few granular casts which could signify early ATN.  -- Previously noted to be on normal saline, was switched to Plasma-Lyte on 7/6.  However, overnight refusing continuation of IV fluid (plasmalyte).  As per patient, worsening his GI symptoms. Continues with N/V.   --Blood pressure a little soft  --Magnesium and Phosphorus stable   --UO: 1.2 L - would continue bladder scan/retention protocol as previous hx of retention.   --Discussed with SLIM - patient noted to be refusing care/medications  --Recommend checking a renal ultrasound with noted creatinine increase  --Encouraged oral intake/oral fluids as patient does not wish to have IV fluids on at this time. Per nursing staff refuses Plasmalyte, only wants normal saline. Will start on gentle IVF at this time for 10 hours d/t poor oral intake + GI loss   -- Per primary team, further options like PEG tube placement and he said that he would like gastroenterology and hematology oncology to have a discussion with him and then he can decide and we can go ahead with placement of PEG tube hospitalization.   Nausea vomiting and diarrhea  -- Per charting, 400cc emesis noted  from yesterday  -- GI following  Chronic kidney disease, stage 3 ([facility])  Lab Results   Component Value Date    EGFR 26 07/06/2025    EGFR 34 07/05/2025    EGFR 45 07/04/2025    CREATININE 2.49 (H) 07/06/2025    CREATININE 2.01 (H) 07/05/2025    CREATININE 1.60 (H) 07/04/2025     Chronic kidney disease stage IIIB  -- Baseline creatinine historically 1.3 to 1.6 mg/dL  --No outpatient follow-up  --Recently baseline creatinine has been worsening prior to admission and more closer to mid 1's recently  Essential hypertension  -- Blood pressure soft, currently 104/71  -- refuses IV plasmaLyte, encouraged oral intake/fluid  --Hold lisinopril and hydrochlorothiazide  --Avoid fluctuations in blood pressure  Type 2 diabetes mellitus with chronic kidney disease, without long-term current use of insulin ([facility])  Lab Results   Component Value Date    HGBA1C 10.4 (H) 04/06/2025   -- Poorly controlled hemoglobin A1c 10.4  -- Hold SGLT2 inhibitor at this time due to volume depletion and VIVIANA    Recent Labs     07/06/25  1021 07/06/25  1602 07/06/25 2024 07/07/25  0623   POCGLU 213* 231* 206* 170*       Opioid dependence, uncomplicated ([facility])    Hyponatremia  -- Sodium 132 today has been chronic since February  --Awaiting current sodium  --Discontinue hydrochlorothiazide and avoid going forward in the future  --No workup noted, likely possible component of SIADH d/t pain as well as poor oral intake + noted to be on thiazide diuretic . Workup placed.   Low bicarbonate level  -- Bicarbonate level is dropped to 17 no anion gap  -- Patient refused IVF overnight as he states they are making his GI symptoms worse.   -- If no improvement in bicarb level, recommend starting oral bicarb tablets.  Unless patient be willing to be placed on bicarb gtt    Hypocalcemia  -- Last calcium 7.6  -- Morning labs pending  -- No Ical drawn this morning, will obtain tomorrow morning  Malignant neoplasm of lower third of esophagus (HCC)  -- Patient requesting  "hematology consultation  Deep vein thrombosis (DVT) of femoral vein of left lower extremity (HCC)  -- Management as per the primary team  Urinary retention  -- Urinary retention protocol  Pancytopenia (HCC)  -- As per hematology      Subjective   Patient seen while going back into bed.  Patient states, \"I hurt all over\" and was also complaining of, \"shooting fire out of his backside\" while going to the bathroom, in which he blames the IVF Plasma Lyte and the electrolytes he was on overnight that are causing worsening GI symptoms.  Complains of some shortness of breath, relates it to pain.  No chest pain at this time.         Objective :  Temp:  [97.6 °F (36.4 °C)-98.1 °F (36.7 °C)] 97.6 °F (36.4 °C)  HR:  [66-93] 80  BP: (104-105)/(55-71) 104/71  Resp:  [20] 20  SpO2:  [93 %-95 %] 95 %  O2 Device: None (Room air)    Current Weight: Weight - Scale: 121 kg (267 lb 3.2 oz)  First Weight: Weight - Scale: 121 kg (267 lb 10.2 oz)  I/O         07/05 0701  07/06 0700 07/06 0701  07/07 0700 07/07 0701  07/08 0700    P.O. 680 800     I.V. (mL/kg) 998.8 (8.3) 1945 (16.1)     Total Intake(mL/kg) 1678.8 (13.9) 2745 (22.7)     Urine (mL/kg/hr) 1250 (0.4) 1250 (0.4)     Emesis/NG output  400     Stool  0     Total Output 1250 1650     Net +428.8 +1095            Unmeasured Urine Occurrence 1 x 1 x     Unmeasured Stool Occurrence  2 x           Physical Exam  Vitals and nursing note reviewed.   Constitutional:       General: He is not in acute distress.    Cardiovascular:      Rate and Rhythm: Normal rate.   Pulmonary:      Effort: Pulmonary effort is normal. No respiratory distress.      Breath sounds: Normal breath sounds. No wheezing or rales.   Abdominal:      General: There is no distension.      Palpations: Abdomen is soft.      Tenderness: There is no abdominal tenderness.     Skin:     General: Skin is warm and dry.     Neurological:      Mental Status: He is alert and oriented to person, place, and time. Mental status is " "at baseline.         Medications:  Current Medications[1]      Lab Results: I have reviewed the following results:  Results from last 7 days   Lab Units 07/06/25  0541 07/05/25  0620 07/04/25  0501 07/03/25  0459 07/02/25  0457 07/01/25  1424 07/01/25  1223   WBC Thousand/uL 3.18* 1.82* 2.08* 2.04* 2.89*  --  5.91   HEMOGLOBIN g/dL 11.2* 11.7* 12.0 11.8* 11.9*  --  13.8   HEMATOCRIT % 35.6* 36.6 38.0 36.9 37.8  --  41.4   PLATELETS Thousands/uL 155 158 142* 124* 129* 134* 190   POTASSIUM mmol/L 4.3 4.6 4.3 4.4 4.1 4.8 4.8   CHLORIDE mmol/L 103 103 105 104 100 93* 88*   CO2 mmol/L 17* 20* 20* 20* 21 24 25   BUN mg/dL 54* 50* 55* 65* 84* 90* 92*   CREATININE mg/dL 2.49* 2.01* 1.60* 1.94* 2.61* 2.87* 2.97*   CALCIUM mg/dL 7.6* 7.9* 7.9* 7.7* 7.8* 8.3* 9.3   MAGNESIUM mg/dL  --   --   --   --  3.4*  --   --    ALBUMIN g/dL  --   --   --  3.4* 3.6  --  4.3       Administrative Statements     Portions of the record may have been created with voice recognition software. Occasional wrong word or \"sound a like\" substitutions may have occurred due to the inherent limitations of voice recognition software. Read the chart carefully and recognize, using context, where substitutions have occurred.If you have any questions, please contact the dictating provider.         [1]   Current Facility-Administered Medications:     acetaminophen (TYLENOL) tablet 650 mg, 650 mg, Oral, Q6H PRN, Farnaz Loredo MD    amitriptyline (ELAVIL) tablet 30 mg, 30 mg, Oral, HS, Farnaz Loredo MD, 30 mg at 07/06/25 2212    ammonium lactate (LAC-HYDRIN) 12 % lotion, , Topical, BID PRN, Farnaz Loredo MD    carvedilol (COREG) tablet 12.5 mg, 12.5 mg, Oral, BID With Meals, Farnaz Loredo MD    [Held by provider] Empagliflozin TABS 25 mg, 25 mg, Oral, Daily, Farnaz Loredo MD, 25 mg at 07/01/25 1532    [Held by provider] hydroCHLOROthiazide tablet 12.5 mg, 12.5 mg, Oral, BID, Farnaz Loredo MD, 12.5 mg at 07/05/25 " 1703    hydrocortisone (ANUSOL-HC) 2.5 % rectal cream, , Topical, 4x Daily PRN, Rachele Cabrera MD    hydrocortisone (ANUSOL-HC) rectal suppository 25 mg, 25 mg, Rectal, BID, Rachele Cabrera MD    insulin glargine (LANTUS) subcutaneous injection 30 Units 0.3 mL, 30 Units, Subcutaneous, HS, Rachele Cabrera MD, 30 Units at 07/06/25 2212    insulin lispro (HumALOG/ADMELOG) 100 units/mL subcutaneous injection 1-5 Units, 1-5 Units, Subcutaneous, HS, Farnaz Loredo MD, 1 Units at 07/06/25 2223    insulin lispro (HumALOG/ADMELOG) 100 units/mL subcutaneous injection 1-6 Units, 1-6 Units, Subcutaneous, TID AC, 1 Units at 07/07/25 0635 **AND** Fingerstick Glucose (POCT), , , TID AC, Farnaz Loredo MD    lidocaine (LIDODERM) 5 % patch 1 patch, 1 patch, Topical, Daily, Farnaz Loredo MD, 1 patch at 07/07/25 0820    loperamide (IMODIUM) capsule 2 mg, 2 mg, Oral, 4x Daily PRN, Farnaz Loredo MD, 2 mg at 07/03/25 1624    methocarbamol (ROBAXIN) tablet 1,500 mg, 1,500 mg, Oral, Q6H PRN, Farnaz Loredo MD, 1,500 mg at 07/07/25 0641    metoclopramide (REGLAN) injection 10 mg, 10 mg, Intravenous, Q6H PRN, Rachele Cabrera MD, 10 mg at 07/06/25 1448    mirtazapine (REMERON) tablet 7.5 mg, 7.5 mg, Oral, HS, Rachele Cabrera MD, 7.5 mg at 07/06/25 2212    morphine injection 2 mg, 2 mg, Intravenous, Q4H PRN, Rachele Cabrera MD, 2 mg at 07/04/25 1813    multi-electrolyte (Plasmalyte-A/Isolyte-S PH 7.4/Normosol-R) IV solution, 75 mL/hr, Intravenous, Continuous, Trinh Mcfarland MD, Last Rate: 75 mL/hr at 07/06/25 1106, 75 mL/hr at 07/06/25 1106    ondansetron (ZOFRAN) 8 mg in sodium chloride 0.9 % 50 mL IVPB, 8 mg, Intravenous, Q6H, Bela Szymanski MD, Last Rate: 200 mL/hr at 07/07/25 0416, 8 mg at 07/07/25 0416    pantoprazole (PROTONIX) EC tablet 40 mg, 40 mg, Oral, BID AC, Farnaz Loredo MD, 40 mg at 07/07/25 0608    promethazine (PHENERGAN) injection 25 mg, 25 mg, Intramuscular, Q6H PRN, Genia Patterson  Ghanshyam, DO    rimegepant sulfate (NURTEC) disintegrating tablet 75 mg, 75 mg, Oral, Daily PRN, Farnaz Loredo MD, 75 mg at 07/04/25 1127    rivaroxaban (XARELTO) tablet 15 mg, 15 mg, Oral, Daily With Breakfast, Rachele Cabrera MD, 15 mg at 07/07/25 0820    tamsulosin (FLOMAX) capsule 0.4 mg, 0.4 mg, Oral, Daily With Dinner, Rachele Cabrera MD, 0.4 mg at 07/06/25 1644    traMADol (ULTRAM) tablet 100 mg, 100 mg, Oral, HS, Farnaz Loredo MD, 100 mg at 07/06/25 0169

## 2025-07-07 NOTE — ASSESSMENT & PLAN NOTE
-- Last calcium 7.6  -- Morning labs pending  -- No Ical drawn this morning, will obtain tomorrow morning

## 2025-07-07 NOTE — ASSESSMENT & PLAN NOTE
Patient presents with volume depletion, VIVIANA, hyponatremia  Hold lisinopril/hydrochlorothiazide/empagliflozin.

## 2025-07-07 NOTE — CASE MANAGEMENT
Case Management Discharge Planning Note    Patient name Yoni Castillo  Location 7T [facility] 706/7T [facility] 706-01 MRN [id number]  : 1965 Date 2025       Current Admission Date: 2025  Current Admission Diagnosis:Acute kidney injury superimposed on stage 3a chronic kidney disease (HCC)   Patient Active Problem List    Diagnosis Date Noted    Nausea vomiting and diarrhea 2025    VIVIANA (acute kidney injury) ([facility]) 2025    Low bicarbonate level 2025    Hypocalcemia 2025    Urinary retention 2025    Pancytopenia ([facility]) 2025    Acute kidney injury superimposed on stage 3a chronic kidney disease ([facility]) 2025    Type 2 diabetes mellitus with hyperglycemia, without long-term current use of insulin ([facility]) 2025    Deep vein thrombosis (DVT) of femoral vein of left lower extremity ([facility]) 2025    Adhesive capsulitis of right shoulder associated with type 2 diabetes mellitus  ([facility]) 2025    Malignant neoplasm of lower third of esophagus ([facility]) 2025    Esophageal mass 2025    Other dysphagia 2025    Hyponatremia 2025    Non-pressure chronic ulcer of left heel and midfoot with necrosis of bone ([facility]) 2025    Opioid dependence, uncomplicated ([facility]) 2025    JEWEL (obstructive sleep apnea) 2025    Acute pain of right shoulder 04/15/2025    Preoperative clearance 04/15/2025    Right wrist tendonitis 2025    Anemia 2025    Wrist stiffness, right 2025    Stiffness of finger joint of right hand 2025    Tenosynovitis of finger and hand 2024    Preoperative cardiovascular examination 2024    Mucoid cyst of joint 2024    Nephrolithiasis 2024    Dog bite 2024    Dupuytren's disease of finger with nodules without contracture 10/17/2023    Acquired absence of other left toe(s) ([facility]) 2023    Incisional hernia without obstruction or gangrene 2023    Ventral hernia without obstruction or  gangrene 06/15/2023    Chronic kidney disease, stage 3 ([facility]) 02/22/2022    Neuropathy 06/17/2021    Low back pain with sciatica 03/26/2021    Cervicalgia 03/26/2021    Cervical radiculopathy 03/26/2021    Class 2 severe obesity with serious comorbidity in adult ([facility]) 03/23/2021    Essential hypertension     Type 2 diabetes mellitus with chronic kidney disease, without long-term current use of insulin ([facility])       LOS (days): 6  Geometric Mean LOS (GMLOS) (days): 4.4  Days to GMLOS:-1.6     OBJECTIVE:  Risk of Unplanned Readmission Score: 46.7         Current admission status: Inpatient   Preferred Pharmacy:   [facility],  [address] [place] [address],  [facility] [address]  Phone: [phone] Fax: [phone]    Primary Care Provider: Judd Ji MD    Primary Insurance: MEDICARE  Secondary Insurance: BLUE CROSS    DISCHARGE DETAILS:       Additional Comments: Discussed case with Dr Arroyo in patient care rounds and is not medically ready for discharge.  Nephrology Hem/onc and GI following.  CM following thru discharge

## 2025-07-07 NOTE — CASE MANAGEMENT
Case Management Discharge Planning Note    Patient name Yoni Castillo  Location 7T [facility] 706/7T [facility] 706-01 MRN [id number]  : 1965 Date 2025       Current Admission Date: 2025  Current Admission Diagnosis:Acute kidney injury superimposed on stage 3a chronic kidney disease (HCC)   Patient Active Problem List    Diagnosis Date Noted    Nausea vomiting and diarrhea 2025    VIVIANA (acute kidney injury) ([facility]) 2025    Low bicarbonate level 2025    Hypocalcemia 2025    Urinary retention 2025    Pancytopenia ([facility]) 2025    Acute kidney injury superimposed on stage 3a chronic kidney disease ([facility]) 2025    Type 2 diabetes mellitus with hyperglycemia, without long-term current use of insulin ([facility]) 2025    Deep vein thrombosis (DVT) of femoral vein of left lower extremity ([facility]) 2025    Adhesive capsulitis of right shoulder associated with type 2 diabetes mellitus  ([facility]) 2025    Malignant neoplasm of lower third of esophagus ([facility]) 2025    Esophageal mass 2025    Other dysphagia 2025    Hyponatremia 2025    Non-pressure chronic ulcer of left heel and midfoot with necrosis of bone ([facility]) 2025    Opioid dependence, uncomplicated ([facility]) 2025    JEWEL (obstructive sleep apnea) 2025    Acute pain of right shoulder 04/15/2025    Preoperative clearance 04/15/2025    Right wrist tendonitis 2025    Anemia 2025    Wrist stiffness, right 2025    Stiffness of finger joint of right hand 2025    Tenosynovitis of finger and hand 2024    Preoperative cardiovascular examination 2024    Mucoid cyst of joint 2024    Nephrolithiasis 2024    Dog bite 2024    Dupuytren's disease of finger with nodules without contracture 10/17/2023    Acquired absence of other left toe(s) ([facility]) 2023    Incisional hernia without obstruction or gangrene 2023    Ventral hernia without obstruction or  gangrene 06/15/2023    Chronic kidney disease, stage 3 ([facility]) 02/22/2022    Neuropathy 06/17/2021    Low back pain with sciatica 03/26/2021    Cervicalgia 03/26/2021    Cervical radiculopathy 03/26/2021    Class 2 severe obesity with serious comorbidity in adult ([facility]) 03/23/2021    Essential hypertension     Type 2 diabetes mellitus with chronic kidney disease, without long-term current use of insulin ([facility])       LOS (days): 6  Geometric Mean LOS (GMLOS) (days): 4.4  Days to GMLOS:-1.7     OBJECTIVE:  Risk of Unplanned Readmission Score: 46.7         Current admission status: Inpatient   Preferred Pharmacy:   [facility] [place] [place] [facility] [address]  [facility],  [address]  [facility],  [facility] [address]  Phone: [phone] Fax: [phone]    Primary Care Provider: Judd Ji MD    Primary Insurance: MEDICARE  Secondary Insurance: BLUE CROSS    DISCHARGE DETAILS:       Additional Comments: Patient will require transfer to [facility] for HIGHER level of care.  Medical necessity completed and in patient folder

## 2025-07-07 NOTE — CONSULTS
e-Consult (IPC) - Oncology-Medical   Name: Yoni Castillo 60 y.o. male I MRN: [id number]  Unit/Bed#: 7T [facility] 706-01 I Date of Admission: 7/1/2025   Date of Service: 7/7/2025 I Hospital Day: 6   Inpatient consult to Oncology  Consult performed by: Daryl Novak MD  Consult ordered by: Margaret Arroyo MD      Physician Requesting Evaluation: Margaret Arroyo MD   Reason for Evaluation / Principal Problem:    ASSESSMENT:  60 year-old male with multiple medical comorbidities including type 2 diabetes mellitus insulin requiring, stage III chronic kidney disease (CKD) likely caused by diabetic nephropathy, hypertension, hypercholesterolemia, peripheral neuropathy, and new diagnosis of 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  The patient presented to the emergency department in mid May 2025 with progressive dysphagia to solids, upper abdominal pain, nausea and vomiting.  Initial pathologic diagnosis of invasive moderately to poorly differentiated adenocarcinoma with signet ring and squamous cell features of the distal esophagus was established on May 19, 2025.  The patient is referred to medical oncology clinic for consideration of multimodality treatment of distal esophageal cancer. The patient's primary malignancy of the distal esophagus is predominantly a poorly differentiated adenocarcinoma with squamous and signet ring cell features.     INTERIM HISTORY: On June 25, 2025, Mr. Castillo initiated a first cycle of neoadjuvant infusional 5-fluorouracil, oxaliplatin, docetaxel (FLOT).  Neoadjuvant chemotherapy was complicated with severe gastrointestinal mucositis, nausea, vomiting, diarrhea, poor oral intake, and acute kidney injury on top of CKD, likely due to dehydration caused by poor oral intake, and severe GI tract mucositis.  He was admitted to the [facility] on July 1, 2025 for management of chemotherapy  associated GI mucositis and acute kidney injury.  In addition, he developed some myelosuppression characterized by moderate leukopenia and mild thrombocytopenia. Thrombocytopenia is currently resolved.  Also, he has mild anemia.  During the present hospitalization, a non-contrast CT scan of the chest obtained on July 1, 2025 did not show acute intrapulmonary process.  There was no evidence of pneumonia.  There was mild mural thickening of the distal esophagus in keeping with history of esophageal cancer. Borderline/minimally enlarged periportal and upper retroperitoneal lymph nodes, nonspecific, were unchanged from previous examination.  Also, at the time of admission the patient had uncontrolled hyperglycemia with a serum glucose as high as 479 mg/dL.  He had evidence of acute on chronic renal failure with a serum creatinine of 2.97 mg/dL.  Since his initial admission, he continues to have decline of renal function.  Today's serum creatinine is 3.98 mg/dL.    RECOMMENDATIONS:  Mr. Castillo will be transferred today to [facility] for management of worsening acute kidney injury on top of CKD.  He will need ongoing, aggressive parenteral hydration with the input of the inpatient nephrology team.  In addition, he needs ongoing nutrition.  This may be accomplished with enteral nutrition through a jejunostomy tube (J-tube).  We will manage the patient with the [place] primary internal medicine/hospitalist team as well as with Dr. Shu Alarcon from surgical oncology.  At this point in time, the patient is not ready to re-initiate a second cycle of neoadjuvant FLOT.  He needs to have improvement of renal function, performance status, nutritional status, and GI mucositis before proceeding with a second cycle of neoadjuvant FLOT.    Plan:  Transfer today from [place] to [facility]  Priority for the patient is aggressive parenteral hydration to revert his acute kidney injury, with input of the nephrology  team  If the patient's renal function does not improve we may consider systemic steroid therapy for a possible diagnosis of immune mediated nephritis related to recent Durvalumab infusion  We expect improvement of mucositis within the next few days  Patient may take Magic mouthwash 4 mL every 6 hours swish and swallow or spit as needed  If diarrhea is persistent, we may obtain repeat stool studies such as cultures and C. Difficile toxin  If the patient's diarrhea does not resolve or worsens, we may request a GI consultation for colonoscopy as well as endoscopy to rule out immune mediated mucositis of the GI tract and initiate systemic steroid therapy for a possible diagnosis of immune mediated colitis/enteritis caused by recent infusion of durvalumab  Obtain calprotectin in stool  We will evaluate the patient with surgical oncologist Dr. Alarcon to determine if the patient may benefit from placement of a J-tube    Mr. Castillo understands and agrees with our management recommendations on the current plan of care    Daryl Novak MD    31 + minutes, >50% of the total time devoted to medical consultative verbal/EMR discussion between providers. Written report will be generated in the EMR.

## 2025-07-07 NOTE — ASSESSMENT & PLAN NOTE
Lab Results   Component Value Date    HGBA1C 10.4 (H) 04/06/2025       Recent Labs     07/06/25 2024 07/07/25  0623 07/07/25  1128 07/07/25  1514   POCGLU 206* 170* 192* 187*       Blood Sugar Average: Last 72 hrs:  (P) [id number]    Uncontrolled in setting of recent steroid therapy  Patient follows with endocrinology  Currently prescribed Lantus 30 units  Initially marked hyperglycemia requiring insulin drip, now transitioned back to subcutaneous insulin as above  Due to patient's diet intolerance(patient reports not being able to eat any solid food and attempts to drink Glucerna diluted with milk, but reports a lot of discomfort during that), will liberate diet to regular and provide with different regular supplements to improve tolerance.

## 2025-07-07 NOTE — CONSULTS
Pt does not meet 2 criteria for malnutrition at this time. Stated he has no desire for food, and cannot eat d/t taste changes for the past 12 days. Prior to that he was eating fine. Does not want any supplements as he vomits after taking them. Can tolerate sugar free water ice only. Pt denies swallowing difficulty.

## 2025-07-07 NOTE — PLAN OF CARE
Problem: Potential for Falls  Goal: Patient will remain free of falls  Description: INTERVENTIONS:  - Educate patient/family on patient safety including physical limitations  - Instruct patient to call for assistance with activity   - Consider consulting OT/PT to assist with strengthening/mobility based on AM PAC & JH-HLM score  - Consult OT/PT to assist with strengthening/mobility   - Keep Call bell within reach  - Keep bed low and locked with side rails adjusted as appropriate  - Keep care items and personal belongings within reach  - Initiate and maintain comfort rounds  - Make Fall Risk Sign visible to staff  - Apply yellow socks and bracelet for high fall risk patients  - Consider moving patient to room near nurses station  Outcome: Progressing     Problem: PAIN - ADULT  Goal: Verbalizes/displays adequate comfort level or baseline comfort level  Description: Interventions:  - Encourage patient to monitor pain and request assistance  - Assess pain using appropriate pain scale  - Administer analgesics as ordered based on type and severity of pain and evaluate response  - Implement non-pharmacological measures as appropriate and evaluate response  - Consider cultural and social influences on pain and pain management  - Notify physician/advanced practitioner if interventions unsuccessful or patient reports new pain  - Educate patient/family on pain management process including their role and importance of  reporting pain   - Provide non-pharmacologic/complimentary pain relief interventions  Outcome: Progressing     Problem: GENITOURINARY - ADULT  Goal: Maintains or returns to baseline urinary function  Description: INTERVENTIONS:  - Assess urinary function  - Encourage oral fluids to ensure adequate hydration if ordered  - Administer ordered medications as needed  - Offer frequent toileting  - Follow urinary retention protocol if ordered  Outcome: Progressing

## 2025-07-07 NOTE — ASSESSMENT & PLAN NOTE
Lab Results   Component Value Date    EGFR 26 07/06/2025    EGFR 34 07/05/2025    EGFR 45 07/04/2025    CREATININE 2.49 (H) 07/06/2025    CREATININE 2.01 (H) 07/05/2025    CREATININE 1.60 (H) 07/04/2025   As above.

## 2025-07-07 NOTE — PROGRESS NOTES
Progress Note - Gastroenterology   Name: Yoni Castillo 60 y.o. 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  Symptoms are severe and causing VIVIANA.     Suspect that patient's symptoms are likely due to chemotherapy related toxicity.  Unfortunately patient's symptoms persist despite symptomatic management.   Oncology is following who has also reached out to surgical oncology at [facility].  They are planning to transfer patient to [place] for further oncology and surgical oncology evaluation.  From a GI standpoint, it is unclear if an esophageal stent would be of benefit since patient is not having severe/significant dysphagia.  Recommend ongoing supportive care and management per oncology and surgical oncology for chemotherapy related toxicity.  Can consider GI advanced endoscopy consultation at [facility] for further evaluation/input on esophageal stent if necessary    Any questions or concerns please do not hesitate to reach out.  Thank you so much for involving us in this patient's care.      Acute kidney injury superimposed on stage 3a chronic kidney disease (HCC)  Lab Results   Component Value Date    EGFR 26 07/06/2025    EGFR 34 07/05/2025    EGFR 45 07/04/2025    CREATININE 2.49 (H) 07/06/2025    CREATININE 2.01 (H) 07/05/2025    CREATININE 1.60 (H) 07/04/2025   As above.   Malignant neoplasm of lower third of esophagus (HCC)  As above.       Subjective     Patient unfortunately continues with significant nausea and fatigue as well as diarrhea and  generalized abdominal pain.  Symptoms all began after chemotherapy.  He also continues to vomit, last episode yesterday evening.  He denies fevers, chills, black or bloody stool.  He has some intermittent difficulty swallowing but this has not worsened.    Patient has been intermittently refusing IV fluids stating that they make him feel worse.    Objective :  Temp:  [97.6 °F (36.4 °C)-98.1 °F (36.7 °C)] 97.6 °F (36.4 °C)  HR:  [66-94] 80  BP: (104-105)/(55-71) 104/71  Resp:  [20] 20  SpO2:  [93 %-95 %] 95 %  O2 Device: None (Room air)    Physical Exam  Vitals and nursing note reviewed.   Constitutional:       General: He is not in acute distress.     Appearance: He is well-developed. He is not toxic-appearing.   HENT:      Head: Normocephalic and atraumatic.     Eyes:      Conjunctiva/sclera: Conjunctivae normal.       Cardiovascular:      Rate and Rhythm: Normal rate and regular rhythm.      Heart sounds: No murmur heard.  Pulmonary:      Effort: Pulmonary effort is normal. No respiratory distress.      Breath sounds: Normal breath sounds.   Abdominal:      Palpations: Abdomen is soft.      Tenderness: There is abdominal tenderness.     Musculoskeletal:         General: No swelling or tenderness.      Cervical back: Neck supple.     Skin:     General: Skin is warm and dry.      Capillary Refill: Capillary refill takes less than 2 seconds.     Neurological:      General: No focal deficit present.      Mental Status: He is alert and oriented to person, place, and time.     Psychiatric:         Mood and Affect: Mood normal.         Behavior: Behavior normal.         Lab Results: I have reviewed the following results:    Lab Results   Component Value Date    WBC 3.18 (L) 07/06/2025    HGB 11.2 (L) 07/06/2025    HCT 35.6 (L) 07/06/2025    MCV 91 07/06/2025     07/06/2025       Lab Results   Component Value Date    SODIUM 132 (L) 07/06/2025    K 4.3 07/06/2025     07/06/2025    CO2 17 (L) 07/06/2025    AGAP  12 07/06/2025    BUN 54 (H) 07/06/2025    CREATININE 2.49 (H) 07/06/2025    GLUC 239 (H) 07/06/2025    GLUF 171 (H) 04/12/2025    CALCIUM 7.6 (L) 07/06/2025    AST 8 (L) 07/03/2025    ALT 7 07/03/2025    ALKPHOS 36 07/03/2025    TP 6.0 (L) 07/03/2025    TBILI 0.45 07/03/2025    EGFR 26 07/06/2025     Lab Results   Component Value Date    IRON 88 09/16/2022       Lab Results   Component Value Date    INR 1.12 06/03/2025    INR 1.14 05/31/2022    INR 1.11 03/29/2022    PROTIME 14.6 06/03/2025    PROTIME 14.3 05/31/2022    PROTIME 14.0 03/29/2022     Prior Imaging and Procedure Reports Reviewed:    XR chest 1 view portable  Result Date: 7/2/2025  Impression: No radiographic evidence of acute intrathoracic process on this examination which is somewhat limited by low lung volumes.     CT chest without contrast  Result Date: 7/1/2025  Impression: No acute findings in the chest. Mild mural thickening of the distal esophagus in keeping with history of esophageal cancer. Borderline/minimally enlarged periportal and upper retroperitoneal lymph nodes, nonspecific, unchanged from previous examination    EUS (6/2025)  Hypoechoic T2N1 mass was visualized, covering the entire circumference in the lower third of the esophagus, observed with the scope at 39 cm from incisors, extending to the muscularis propria with an indeterminate layer origin. Small 9 mm lymph node in the paraesophageal region.  The stomach and duodenum appeared normal.     EGD (5/2025)  Single malignant-appearing mass (traversable) in the lower third of the esophagus; performed cold forceps biopsy with partial removal. Extending from 37cm to 40cm in the esophagus covering at least 50% of the circumference, arising in what appears to be Butler's esophagus extending from 36cm to GEJ at 40cm.  C3M4 Butler's esophagus observed with an associated lesion  3 cm hiatal hernia - GE junction 40 cm from the incisors, diaphragmatic impression 43 cm from the incisors:  Jeronimo  classification: Grade I  The upper third of the esophagus and middle third of the esophagus appeared normal.  Edematous, erythematous and granular mucosa in the cardia, fundus of the stomach, body of the stomach, incisura and antrum; performed cold forceps biopsy to rule out H. pylori  The duodenal bulb and 2nd part of the duodenum appeared normal.  .

## 2025-07-07 NOTE — ASSESSMENT & PLAN NOTE
-- Sodium 132 today has been chronic since February  --Awaiting current sodium  --Discontinue hydrochlorothiazide and avoid going forward in the future  --No workup noted, likely possible component of SIADH d/t pain as well as poor oral intake + noted to be on thiazide diuretic . Workup placed.

## 2025-07-08 ENCOUNTER — TELEPHONE (OUTPATIENT)
Dept: UROLOGY | Facility: CLINIC | Age: 60
End: 2025-07-08

## 2025-07-08 LAB
ANION GAP SERPL CALCULATED.3IONS-SCNC: 13 MMOL/L (ref 4–13)
BUN SERPL-MCNC: 75 MG/DL (ref 5–25)
CA-I BLD-SCNC: 1.03 MMOL/L (ref 1.12–1.32)
CALCIUM SERPL-MCNC: 7.6 MG/DL (ref 8.4–10.2)
CHLORIDE SERPL-SCNC: 99 MMOL/L (ref 96–108)
CO2 SERPL-SCNC: 19 MMOL/L (ref 21–32)
CORTIS SERPL-MCNC: 26.8 UG/DL
CREAT SERPL-MCNC: 4.26 MG/DL (ref 0.6–1.3)
ERYTHROCYTE [DISTWIDTH] IN BLOOD BY AUTOMATED COUNT: 12.7 % (ref 11.6–15.1)
GFR SERPL CREATININE-BSD FRML MDRD: 13 ML/MIN/1.73SQ M
GLUCOSE SERPL-MCNC: 158 MG/DL (ref 65–140)
GLUCOSE SERPL-MCNC: 158 MG/DL (ref 65–140)
GLUCOSE SERPL-MCNC: 175 MG/DL (ref 65–140)
GLUCOSE SERPL-MCNC: 198 MG/DL (ref 65–140)
GLUCOSE SERPL-MCNC: 328 MG/DL (ref 65–140)
HCT VFR BLD AUTO: 31.9 % (ref 36.5–49.3)
HGB BLD-MCNC: 10 G/DL (ref 12–17)
MAGNESIUM SERPL-MCNC: 2.2 MG/DL (ref 1.9–2.7)
MCH RBC QN AUTO: 28.3 PG (ref 26.8–34.3)
MCHC RBC AUTO-ENTMCNC: 31.3 G/DL (ref 31.4–37.4)
MCV RBC AUTO: 90 FL (ref 82–98)
PHOSPHATE SERPL-MCNC: 3.8 MG/DL (ref 2.3–4.1)
PLATELET # BLD AUTO: 132 THOUSANDS/UL (ref 149–390)
PMV BLD AUTO: 9.9 FL (ref 8.9–12.7)
POTASSIUM SERPL-SCNC: 3.7 MMOL/L (ref 3.5–5.3)
RBC # BLD AUTO: 3.53 MILLION/UL (ref 3.88–5.62)
SODIUM SERPL-SCNC: 131 MMOL/L (ref 135–147)
SODIUM UR-SCNC: 30 MMOL/L
TSH SERPL DL<=0.05 MIU/L-ACNC: 0.72 UIU/ML (ref 0.45–4.5)
WBC # BLD AUTO: 3.2 THOUSAND/UL (ref 4.31–10.16)

## 2025-07-08 PROCEDURE — 85027 COMPLETE CBC AUTOMATED: CPT

## 2025-07-08 PROCEDURE — 84100 ASSAY OF PHOSPHORUS: CPT | Performed by: INTERNAL MEDICINE

## 2025-07-08 PROCEDURE — 84300 ASSAY OF URINE SODIUM: CPT

## 2025-07-08 PROCEDURE — 80048 BASIC METABOLIC PNL TOTAL CA: CPT

## 2025-07-08 PROCEDURE — 99233 SBSQ HOSP IP/OBS HIGH 50: CPT | Performed by: INTERNAL MEDICINE

## 2025-07-08 PROCEDURE — 99232 SBSQ HOSP IP/OBS MODERATE 35: CPT | Performed by: INTERNAL MEDICINE

## 2025-07-08 PROCEDURE — 82330 ASSAY OF CALCIUM: CPT

## 2025-07-08 PROCEDURE — 82948 REAGENT STRIP/BLOOD GLUCOSE: CPT

## 2025-07-08 PROCEDURE — 83735 ASSAY OF MAGNESIUM: CPT | Performed by: INTERNAL MEDICINE

## 2025-07-08 PROCEDURE — 84443 ASSAY THYROID STIM HORMONE: CPT

## 2025-07-08 PROCEDURE — 99233 SBSQ HOSP IP/OBS HIGH 50: CPT

## 2025-07-08 PROCEDURE — 82533 TOTAL CORTISOL: CPT

## 2025-07-08 RX ORDER — SODIUM CHLORIDE, SODIUM GLUCONATE, SODIUM ACETATE, POTASSIUM CHLORIDE, MAGNESIUM CHLORIDE, SODIUM PHOSPHATE, DIBASIC, AND POTASSIUM PHOSPHATE .53; .5; .37; .037; .03; .012; .00082 G/100ML; G/100ML; G/100ML; G/100ML; G/100ML; G/100ML; G/100ML
100 INJECTION, SOLUTION INTRAVENOUS CONTINUOUS
Status: DISCONTINUED | OUTPATIENT
Start: 2025-07-08 | End: 2025-07-08

## 2025-07-08 RX ORDER — METHYLPREDNISOLONE SODIUM SUCCINATE 125 MG/2ML
96.8 INJECTION, POWDER, LYOPHILIZED, FOR SOLUTION INTRAMUSCULAR; INTRAVENOUS DAILY
Status: DISCONTINUED | OUTPATIENT
Start: 2025-07-08 | End: 2025-07-08

## 2025-07-08 RX ORDER — METHYLPREDNISOLONE SODIUM SUCCINATE 125 MG/2ML
100 INJECTION, POWDER, LYOPHILIZED, FOR SOLUTION INTRAMUSCULAR; INTRAVENOUS DAILY
Status: DISCONTINUED | OUTPATIENT
Start: 2025-07-09 | End: 2025-07-09

## 2025-07-08 RX ORDER — CALCIUM GLUCONATE 20 MG/ML
1 INJECTION, SOLUTION INTRAVENOUS ONCE
Status: DISCONTINUED | OUTPATIENT
Start: 2025-07-08 | End: 2025-07-08

## 2025-07-08 RX ADMIN — AMITRIPTYLINE HYDROCHLORIDE 30 MG: 10 TABLET, FILM COATED ORAL at 21:10

## 2025-07-08 RX ADMIN — INSULIN LISPRO 1 UNITS: 100 INJECTION, SOLUTION INTRAVENOUS; SUBCUTANEOUS at 11:49

## 2025-07-08 RX ADMIN — MIRTAZAPINE 7.5 MG: 7.5 TABLET, FILM COATED ORAL at 21:11

## 2025-07-08 RX ADMIN — INSULIN GLARGINE 30 UNITS: 100 INJECTION, SOLUTION SUBCUTANEOUS at 21:10

## 2025-07-08 RX ADMIN — INSULIN LISPRO 2 UNITS: 100 INJECTION, SOLUTION INTRAVENOUS; SUBCUTANEOUS at 16:10

## 2025-07-08 RX ADMIN — INSULIN LISPRO 1 UNITS: 100 INJECTION, SOLUTION INTRAVENOUS; SUBCUTANEOUS at 06:05

## 2025-07-08 RX ADMIN — TRAMADOL HYDROCHLORIDE 100 MG: 50 TABLET, COATED ORAL at 21:11

## 2025-07-08 RX ADMIN — PANTOPRAZOLE SODIUM 40 MG: 40 TABLET, DELAYED RELEASE ORAL at 06:05

## 2025-07-08 RX ADMIN — INSULIN LISPRO 3 UNITS: 100 INJECTION, SOLUTION INTRAVENOUS; SUBCUTANEOUS at 21:13

## 2025-07-08 RX ADMIN — PANTOPRAZOLE SODIUM 40 MG: 40 TABLET, DELAYED RELEASE ORAL at 16:12

## 2025-07-08 RX ADMIN — MORPHINE SULFATE 2 MG: 2 INJECTION, SOLUTION INTRAMUSCULAR; INTRAVENOUS at 01:54

## 2025-07-08 RX ADMIN — LIDOCAINE 5% 1 PATCH: 700 PATCH TOPICAL at 08:10

## 2025-07-08 RX ADMIN — METHYLPREDNISOLONE SODIUM SUCCINATE 96.8 MG: 125 INJECTION, POWDER, FOR SOLUTION INTRAMUSCULAR; INTRAVENOUS at 11:49

## 2025-07-08 RX ADMIN — MORPHINE SULFATE 2 MG: 2 INJECTION, SOLUTION INTRAMUSCULAR; INTRAVENOUS at 06:16

## 2025-07-08 NOTE — ASSESSMENT & PLAN NOTE
Lab Results   Component Value Date    EGFR 13 07/08/2025    EGFR 15 07/07/2025    EGFR 26 07/06/2025    CREATININE 4.26 (H) 07/08/2025    CREATININE 3.98 (H) 07/07/2025    CREATININE 2.49 (H) 07/06/2025   Baseline creatinine 1.4-1.8, presents with severe VIVIANA with creatinine of 2.97  Current creatinine worsened   Apology on board  Please see above under acute kidney injury

## 2025-07-08 NOTE — PROGRESS NOTES
Progress Note - Nephrology   Name: Yoni Castillo 60 y.o. male I MRN: [id number]  Unit/Bed#: 7T [facility] 706-01 I Date of Admission: 7/1/2025   Date of Service: 7/8/2025 I Hospital Day: 7    Assessment & Plan  VIVIANA (acute kidney injury) (HCC)  -- Present on admission, admission creatinine 2.97 mg/dL.  --Current creatinine with noted increase and currently at 4.26. Increased from yesterday at 3.98.  Patient aware of worsening kidney function.   --Hemodialysis reviewed with patient, consent signed.  Signed form placed in seventh floor folder at desk.  Uploaded via haiku into patient chart.  All questions addressed and answered.   --Last outpatient creatinine was 1.84 mg per  --Patient with underlying chronic kidney disease stage III  --During the first hospitalization renal function did improve back to baseline on July 4 to 1.6 mg/dL, now worsening up to 2.49 mg/dL  --Patient has been having some soft blood pressure with poor oral intake along with nausea.  --Hold SGLT2 inhibitor, HCTZ due to acute kidney injury  --Hold lisinopril as you currently  --UA: hyaline casts along with a few granular casts which could signify early ATN.  --Continue on sodium bicarb drip, rate increased earlier today.  Currently infusing at 100 mL/HR. slight increase noted in carbon dioxide  --Blood pressure stable.  Most recent charted blood pressure 120/66  --Magnesium and Phosphorus stable with this morning's labs  --UO has decreased.  Per the patient, appears more concentrated, still yellow.  Per epic charting, incontinent of urine, currently at 150 cc urine output.  No PVR noted   -Concern for worsening VIVIANA also d/t urinary retention/patient refusing straight cath  --Renal ultrasound as of 7/7/2025: Mild bilateral renal cortical thinning.  No hydronephrosis.  PVR of 893.  Per the results, patient declined to urinate.   --Patient continues to have poor appetite, not really tolerate oral intake  --At this time patient pending transfer to [facility]  [facility] for surgical oncology and oncology to evaluate the patient for possible G-tube placement given ongoing poor oral intake and VIVIANA.  Patient aware, in agreement with plan.  Per PAC, accepted to [facility]. Awaiting  time.   --At this time, no urgent need for HD/RRT   Nausea vomiting and diarrhea  --Patient continues to have feelings of nausea and diarrhea  --Poor oral intake  Chronic kidney disease, stage 3 ([facility])  Lab Results   Component Value Date    EGFR 13 07/08/2025    EGFR 15 07/07/2025    EGFR 26 07/06/2025    CREATININE 4.26 (H) 07/08/2025    CREATININE 3.98 (H) 07/07/2025    CREATININE 2.49 (H) 07/06/2025     Chronic kidney disease stage IIIB  -- Baseline creatinine historically 1.3 to 1.6 mg/dL  --No outpatient follow-up  --Recently baseline creatinine has been worsening prior to admission and more closer to mid 1's recently  Essential hypertension  -- Blood pressure stable, 120/66  --Remains on bicarb gtt  --Hold lisinopril and hydrochlorothiazide  --Avoid fluctuations in blood pressure  Type 2 diabetes mellitus with chronic kidney disease, without long-term current use of insulin ([facility])  Lab Results   Component Value Date    HGBA1C 10.4 (H) 04/06/2025   -- Poorly controlled hemoglobin A1c 10.4  -- Hold SGLT2 inhibitor at this time due to volume depletion and VIVIANA    Recent Labs     07/07/25  1514 07/07/25  2030 07/08/25  0603 07/08/25  1148   POCGLU 187* 160* 158* 175*       Opioid dependence, uncomplicated (HCC)    Hyponatremia  --Sodium 131 today has been chronic since February  --Discontinue hydrochlorothiazide and avoid going forward in the future  --Glucose 158  --Urine sodium 30  --Likely component of poor oral intake/ongoing pain/cancer dx  --Monitor on BMP  Low bicarbonate level  --Currently on sodium bicarb drip at 100 mL an hour, continue with drip  --Slight improvement noted bicarb currently at 19    Hypocalcemia  -- Last calcium 7.6, corrects to 8.6 with most recent  albumin  --Ionized Calcium: 1.03  --Monitor calcium, it continues to drop recommend Calcium gluconate IV replacement  Malignant neoplasm of lower third of esophagus (HCC)  -- Patient requesting hematology consultation  Deep vein thrombosis (DVT) of femoral vein of left lower extremity (HCC)  -- Management as per the primary team  Urinary retention  -- Urinary retention protocol  Pancytopenia (HCC)  -- As per hematology      Subjective   Patient seen while in bed with Louise LONGORIA, at bedside. Pt c/o pain. Sips of water taking with frequent coughing after. Per patient, N/V overnight. Denies SOB and CP at this time.         Objective :  Temp:  [96.8 °F (36 °C)-98.5 °F (36.9 °C)] 96.8 °F (36 °C)  HR:  [55-83] 55  BP: (111-120)/(66-70) 120/66  Resp:  [20] 20  SpO2:  [92 %-94 %] 92 %  O2 Device: None (Room air)    Current Weight: Weight - Scale: 121 kg (267 lb 3.2 oz)  First Weight: Weight - Scale: 121 kg (267 lb 10.2 oz)  I/O         07/06 0701  07/07 0700 07/07 0701  07/08 0700 07/08 0701  07/09 0700    P.O. 800 420     I.V. (mL/kg) 1945 (16.1) 1841 (15.2)     Total Intake(mL/kg) 2745 (22.7) 2261 (18.7)     Urine (mL/kg/hr) 1250 (0.4)  150 (0.2)    Emesis/NG output 400      Stool 0      Total Output 1650  150    Net +1095 +2261 -150           Unmeasured Urine Occurrence 1 x 1 x     Unmeasured Stool Occurrence 2 x            Physical Exam  Vitals and nursing note reviewed. Exam conducted with a chaperone present (Louise LONGORIA, at bedside).   Constitutional:       General: He is not in acute distress.  HENT:      Mouth/Throat:      Mouth: Mucous membranes are moist.     Cardiovascular:      Rate and Rhythm: Normal rate.   Pulmonary:      Effort: Pulmonary effort is normal. No respiratory distress.      Breath sounds: Normal breath sounds. No wheezing or rales.   Abdominal:      Palpations: Abdomen is soft.      Tenderness: There is abdominal tenderness.     Musculoskeletal:      Right lower leg: No edema.      Left lower leg: No  "edema.     Skin:     General: Skin is warm and dry.     Neurological:      Mental Status: He is alert and oriented to person, place, and time. Mental status is at baseline.       Medications:  Current Medications[1]      Lab Results: I have reviewed the following results:  Results from last 7 days   Lab Units 07/08/25  0600 07/07/25  0554 07/06/25  0541 07/05/25  0620 07/04/25  0501 07/03/25  0459 07/02/25  0457 07/01/25  1424   WBC Thousand/uL 3.20*  --  3.18* 1.82* 2.08* 2.04* 2.89*  --    HEMOGLOBIN g/dL 10.0*  --  11.2* 11.7* 12.0 11.8* 11.9*  --    HEMATOCRIT % 31.9*  --  35.6* 36.6 38.0 36.9 37.8  --    PLATELETS Thousands/uL 132*  --  155 158 142* 124* 129* 134*   POTASSIUM mmol/L 3.7 3.9 4.3 4.6 4.3 4.4 4.1 4.8   CHLORIDE mmol/L 99 102 103 103 105 104 100 93*   CO2 mmol/L 19* 18* 17* 20* 20* 20* 21 24   BUN mg/dL 75* 70* 54* 50* 55* 65* 84* 90*   CREATININE mg/dL 4.26* 3.98* 2.49* 2.01* 1.60* 1.94* 2.61* 2.87*   CALCIUM mg/dL 7.6* 7.4* 7.6* 7.9* 7.9* 7.7* 7.8* 8.3*   MAGNESIUM mg/dL 2.2 2.2  --   --   --   --  3.4*  --    PHOSPHORUS mg/dL 3.8 3.6  --   --   --   --   --   --    ALBUMIN g/dL  --  2.7*  --   --   --  3.4* 3.6  --        Administrative Statements     Portions of the record may have been created with voice recognition software. Occasional wrong word or \"sound a like\" substitutions may have occurred due to the inherent limitations of voice recognition software. Read the chart carefully and recognize, using context, where substitutions have occurred.If you have any questions, please contact the dictating provider.       [1]   Current Facility-Administered Medications:     acetaminophen (TYLENOL) tablet 650 mg, 650 mg, Oral, Q6H PRN, Farnaz Loredo MD    amitriptyline (ELAVIL) tablet 30 mg, 30 mg, Oral, HS, Farnaz Loredo MD, 30 mg at 07/07/25 2147    ammonium lactate (LAC-HYDRIN) 12 % lotion, , Topical, BID PRN, Farnaz Loredo MD    [Held by provider] Empagliflozin TABS 25 mg, " 25 mg, Oral, Daily, Farnaz Loredo MD, 25 mg at 07/01/25 1532    [Held by provider] hydroCHLOROthiazide tablet 12.5 mg, 12.5 mg, Oral, BID, Farnaz Loredo MD, 12.5 mg at 07/05/25 1703    hydrocortisone (ANUSOL-HC) 2.5 % rectal cream, , Topical, 4x Daily PRN, Rachele Cabrera MD    hydrocortisone (ANUSOL-HC) rectal suppository 25 mg, 25 mg, Rectal, BID, Rachele Cabrera MD    insulin glargine (LANTUS) subcutaneous injection 30 Units 0.3 mL, 30 Units, Subcutaneous, HS, Rachele Cabrera MD, 30 Units at 07/07/25 2144    insulin lispro (HumALOG/ADMELOG) 100 units/mL subcutaneous injection 1-5 Units, 1-5 Units, Subcutaneous, HS, Farnaz Loredo MD, 1 Units at 07/07/25 2145    insulin lispro (HumALOG/ADMELOG) 100 units/mL subcutaneous injection 1-6 Units, 1-6 Units, Subcutaneous, TID AC, 1 Units at 07/08/25 1149 **AND** Fingerstick Glucose (POCT), , , TID AC, Farnaz Loredo MD    lidocaine (LIDODERM) 5 % patch 1 patch, 1 patch, Topical, Daily, Farnaz Loredo MD, 1 patch at 07/08/25 0810    loperamide (IMODIUM) capsule 2 mg, 2 mg, Oral, 4x Daily PRN, Farnaz Loredo MD, 2 mg at 07/03/25 1624    methocarbamol (ROBAXIN) tablet 1,500 mg, 1,500 mg, Oral, Q6H PRN, Farnaz Loredo MD, 1,500 mg at 07/07/25 0641    [START ON 7/9/2025] methylPREDNISolone sodium succinate (Solu-MEDROL) injection 100 mg, 100 mg, Intravenous, Daily, Margaret Arroyo MD    metoclopramide (REGLAN) injection 10 mg, 10 mg, Intravenous, Q6H PRN, Rachele Cabrera MD, 10 mg at 07/06/25 1448    mirtazapine (REMERON) tablet 7.5 mg, 7.5 mg, Oral, HS, Rachele Cabrera MD, 7.5 mg at 07/07/25 2147    morphine injection 2 mg, 2 mg, Intravenous, Q4H PRN, Rachele Cabrera MD, 2 mg at 07/08/25 0616    pantoprazole (PROTONIX) EC tablet 40 mg, 40 mg, Oral, BID AC, Farnaz Loredo MD, 40 mg at 07/08/25 0605    promethazine (PHENERGAN) injection 25 mg, 25 mg, Intramuscular, Q6H PRN, Genia Morrissey,     rimegepant sulfate  (NURTEC) disintegrating tablet 75 mg, 75 mg, Oral, Daily PRN, Farnaz Loredo MD, 75 mg at 07/04/25 1127    rivaroxaban (XARELTO) tablet 15 mg, 15 mg, Oral, Daily With Breakfast, Rachele Cabrera MD, 15 mg at 07/07/25 0820    sodium bicarbonate 150 mEq in dextrose 5 % 1,000 mL infusion, , Intravenous, Continuous, Margaret Arroyo MD, Last Rate: 100 mL/hr at 07/08/25 1153, Rate Change at 07/08/25 1153    tamsulosin (FLOMAX) capsule 0.4 mg, 0.4 mg, Oral, Daily With Dinner, Rachele Cabrera MD, 0.4 mg at 07/07/25 1718    traMADol (ULTRAM) tablet 100 mg, 100 mg, Oral, HS, Farnaz Loredo MD, 100 mg at 07/07/25 0344

## 2025-07-08 NOTE — ASSESSMENT & PLAN NOTE
For now hold neoadjuvant FLOT plus durvalumab until patient has improvement of acute kidney injury as well as chemotherapy related mucositis.

## 2025-07-08 NOTE — ASSESSMENT & PLAN NOTE
-- Blood pressure stable, 120/66  --Remains on bicarb gtt  --Hold lisinopril and hydrochlorothiazide  --Avoid fluctuations in blood pressure

## 2025-07-08 NOTE — ASSESSMENT & PLAN NOTE
--Currently on sodium bicarb drip at 100 mL an hour, continue with drip  --Slight improvement noted bicarb currently at 19

## 2025-07-08 NOTE — ASSESSMENT & PLAN NOTE
Lab Results   Component Value Date    HGBA1C 10.4 (H) 04/06/2025   Management by the primary hospitalist service    Recent Labs     07/07/25  1128 07/07/25  1514 07/07/25  2030 07/08/25  0603   POCGLU 192* 187* 160* 158*       Blood Sugar Average: Last 72 hrs:  (P) [id number]

## 2025-07-08 NOTE — ASSESSMENT & PLAN NOTE
Improving.  Continue best supportive care.  The clinical presentation clinical course of diarrhea is consistent with chemotherapy induced mucositis and not suggestive of immune mediated colitis caused by durvalumab.

## 2025-07-08 NOTE — PROGRESS NOTES
Progress Note - Hospitalist   Name: Yoni Castillo 60 y.o. male I MRN: [id number]  Unit/Bed#: 7T [facility] 706-01 I Date of Admission: 7/1/2025   Date of Service: 7/8/2025 I Hospital Day: 7    Assessment & Plan  Acute kidney injury superimposed on stage 3a chronic kidney disease (HCC)  This is a 60-year-old male patient with history of recently diagnosed esophageal cancer in May 2025, diabetes, recently diagnosed acute DVT, hypertension who started chemotherapy 6/26 and presents with p.o. intolerance, nausea, vomiting and diarrhea.  Found to have acute kidney injury with creatinine of 2.97 with baseline creatinine 1.3-1.8    Initially associated with hypotension responded to 2 L normal saline boluses  VIVIANA secondary to prerenal causes due to GI losses in setting of chemotherapy, urinary retention may be contributing  Creatinine is trending up  Patient noted for urinary retention but is declining catheterization, continue to monitor  On board currently patient on gentle hydration as per nephrology recommendation  Monitor BMP, I's and O's closely  Continue with IV fluids  After discussing with oncology and nephrology concerns of durvalumab mediated injury patient has been initiated on IV methylprednisone 100 mg daily in light of worsening kidney functions despite IV hydration and recent exposure to the durvalumab as per oncology.      Lab Results   Component Value Date    EGFR 13 07/08/2025    EGFR 15 07/07/2025    EGFR 26 07/06/2025    CREATININE 4.26 (H) 07/08/2025    CREATININE 3.98 (H) 07/07/2025    CREATININE 2.49 (H) 07/06/2025     Essential hypertension  Patient presents with volume depletion, VIVIANA, hyponatremia  Hold lisinopril/hydrochlorothiazide/empagliflozin.    Type 2 diabetes mellitus with chronic kidney disease, without long-term current use of insulin ([facility])  Lab Results   Component Value Date    HGBA1C 10.4 (H) 04/06/2025       Recent Labs     07/07/25  2030 07/08/25  0603 07/08/25  1148 07/08/25  1608   POCGLU  160* 158* 175* 198*       Blood Sugar Average: Last 72 hrs:  (P) [id number]    Uncontrolled in setting of recent steroid therapy  Patient follows with endocrinology  Currently prescribed Lantus 30 units  Initially marked hyperglycemia requiring insulin drip, now transitioned back to subcutaneous insulin as above  Due to patient's diet intolerance(patient reports not being able to eat any solid food and attempts to drink Glucerna diluted with milk, but reports a lot of discomfort during that), will liberate diet to regular and provide with different regular supplements to improve tolerance.    Chronic kidney disease, stage 3 (HCC)  Lab Results   Component Value Date    EGFR 13 07/08/2025    EGFR 15 07/07/2025    EGFR 26 07/06/2025    CREATININE 4.26 (H) 07/08/2025    CREATININE 3.98 (H) 07/07/2025    CREATININE 2.49 (H) 07/06/2025   Baseline creatinine 1.4-1.8, presents with severe VIVIANA with creatinine of 2.97  Current creatinine worsened   Apology on board  Please see above under acute kidney injury  Opioid dependence, uncomplicated (HCC)  Maintained on tramadol -was held due to VIVIANA, however patient requests that it is resumed, 100 mg at bedtime  Discontinue oxycodone  Hyponatremia  Hypovolemic as well as pseudohyponatremia in setting of hyperglycemia  Sodium corrects to 133  Stable   Malignant neoplasm of lower third of esophagus (HCC)  Follows with heme-onc Dr. Novak  On chemotherapy with fluorouracil, leucovorin, oxaliplatin, docetaxel every 14 days, completed 6/26, next cycle scheduled for 7/9  Patient reports of difficulty tolerating diet due to abdominal discomfort and nausea  No swallowing difficulties reported  GI has seen the patient in May 2025.  Reconsulted GI appreciate recs    - Had a detailed discussion with gastroenterology along with oncology and surgical oncology via secure chat.  Given patient's decreased oral intake, acute kidney injuries, other routes for nutrition supplementations  were discussed with recommendations to transfer patient to [facility] for surgical oncology and oncology to evaluate the patient for for possible G-tube placement.  -Patient is in agreement with transfer  - Patient has been accepted awaiting bed and then transfer to [place]    Deep vein thrombosis (DVT) of femoral vein of left lower extremity (HCC)  In setting of hypercoagulability of malignancy  Prescribed Xarelto for 6 months therapy, however has not been taking due to misunderstanding  Continue with after discussion with GI      Urinary retention  Associated with VIVIANA  Patient has declined catheterization  Continue to monitor PVR, encourage ambulation  Monitor BMP  Consider in versus short-term outpatient urology evaluation  Started Flomax, but patient refused it  Pancytopenia (HCC)  Mild  WBC trending down, continue to monitor  Likely chemotherapy induced  Monitor CBC  Also, mild drop in Hgb  Close monitoring due to reports of rectal bleeding  Low bicarbonate level  Urology on board on IV fluids with bicarb until hydration.  Nausea vomiting and diarrhea  Improving somewhat    VTE Pharmacologic Prophylaxis: VTE Score: 5 Xarelto    Mobility:   Basic Mobility Inpatient Raw Score: 23  JH-HLM Goal: 7: Walk 25 feet or more  JH-HLM Achieved: 6: Walk 10 steps or more      Education and Discussions with Family / Patient: Patient declined call to .     Current Length of Stay: 7 day(s)  Current Patient Status: Inpatient   Certification Statement:   Discharge Plan: As per bed availability at [facility]    Code Status: Level 1 - Full Code    Subjective   Patient seen examined bedside no acute concerns states he feels better than before.  Looking forward for the transfer.    Objective :  Temp:  [96.8 °F (36 °C)-98.5 °F (36.9 °C)] 98 °F (36.7 °C)  HR:  [55-92] 92  BP: (102-120)/(66-72) 102/72  Resp:  [20] 20  SpO2:  [92 %-94 %] 92 %  O2 Device: None (Room air)    Body mass index is 38.08  kg/m².     Input and Output Summary (last 24 hours):     Intake/Output Summary (Last 24 hours) at 7/8/2025 1911  Last data filed at 7/8/2025 1520  Gross per 24 hour   Intake 1300 ml   Output 150 ml   Net 1150 ml       Physical Exam  Vitals and nursing note reviewed.   Constitutional:       General: He is not in acute distress.     Appearance: He is well-developed. He is obese.   HENT:      Head: Normocephalic and atraumatic.     Eyes:      Conjunctiva/sclera: Conjunctivae normal.       Cardiovascular:      Rate and Rhythm: Normal rate and regular rhythm.      Heart sounds: No murmur heard.  Pulmonary:      Effort: Pulmonary effort is normal. No respiratory distress.      Breath sounds: Normal breath sounds.   Abdominal:      Palpations: Abdomen is soft.      Tenderness: There is no abdominal tenderness.     Musculoskeletal:         General: No swelling.      Cervical back: Neck supple.     Skin:     General: Skin is warm and dry.      Capillary Refill: Capillary refill takes less than 2 seconds.     Neurological:      Mental Status: He is alert and oriented to person, place, and time.     Psychiatric:         Mood and Affect: Mood normal.           Lines/Drains:                     Lab Results: I have reviewed the following results:   Results from last 7 days   Lab Units 07/08/25  0600 07/06/25  0541 07/03/25  0459 07/02/25  0457   WBC Thousand/uL 3.20* 3.18*   < > 2.89*   HEMOGLOBIN g/dL 10.0* 11.2*   < > 11.9*   HEMATOCRIT % 31.9* 35.6*   < > 37.8   PLATELETS Thousands/uL 132* 155   < > 129*   BANDS PCT %  --  16*   < >  --    SEGS PCT %  --   --   --  65   LYMPHO PCT %  --  14   < > 24   MONO PCT %  --  25*   < > 5   EOS PCT %  --  0   < > 5    < > = values in this interval not displayed.     Results from last 7 days   Lab Units 07/08/25  0600 07/07/25  0554 07/04/25  0501 07/03/25  0459   SODIUM mmol/L 131* 131*   < > 133*   POTASSIUM mmol/L 3.7 3.9   < > 4.4   CHLORIDE mmol/L 99 102   < > 104   CO2 mmol/L 19*  18*   < > 20*   BUN mg/dL 75* 70*   < > 65*   CREATININE mg/dL 4.26* 3.98*   < > 1.94*   ANION GAP mmol/L 13 11   < > 9   CALCIUM mg/dL 7.6* 7.4*   < > 7.7*   ALBUMIN g/dL  --  2.7*  --  3.4*   TOTAL BILIRUBIN mg/dL  --   --   --  0.45   ALK PHOS U/L  --   --   --  36   ALT U/L  --   --   --  7   AST U/L  --   --   --  8*   GLUCOSE RANDOM mg/dL 158* 177*   < > 206*    < > = values in this interval not displayed.         Results from last 7 days   Lab Units 07/08/25  1608 07/08/25  1148 07/08/25  0603 07/07/25  2030 07/07/25  1514 07/07/25  1128 07/07/25  0623 07/06/25 2024 07/06/25  1602 07/06/25  1021 07/06/25  0534 07/05/25 2028   POC GLUCOSE mg/dl 198* 175* 158* 160* 187* 192* 170* 206* 231* 213* 221* 256*               Recent Cultures (last 7 days):   Results from last 7 days   Lab Units 07/02/25  1826   C DIFF TOXIN B BY PCR  Negative       XR chest 1 view portable  Result Date: 7/2/2025  Impression: No radiographic evidence of acute intrathoracic process on this examination which is somewhat limited by low lung volumes. Workstation performed: [id number]     CT chest without contrast  Result Date: 7/1/2025  Impression: No acute findings in the chest. Mild mural thickening of the distal esophagus in keeping with history of esophageal cancer. Borderline/minimally enlarged periportal and upper retroperitoneal lymph nodes, nonspecific, unchanged from previous examination. Computerized Assisted Algorithm (CAA) may have aided analysis of applicable images. Resident: SINGH LIZ I, the attending radiologist, have reviewed the images and agree with the final report above. Workstation performed: [id number]         Last 24 Hours Medication List:     Current Facility-Administered Medications:     acetaminophen (TYLENOL) tablet 650 mg, Q6H PRN    amitriptyline (ELAVIL) tablet 30 mg, HS    ammonium lactate (LAC-HYDRIN) 12 % lotion, BID PRN    [Held by provider] Empagliflozin TABS 25 mg, Daily    [Held by provider]  hydroCHLOROthiazide tablet 12.5 mg, BID    hydrocortisone (ANUSOL-HC) 2.5 % rectal cream, 4x Daily PRN    hydrocortisone (ANUSOL-HC) rectal suppository 25 mg, BID    insulin glargine (LANTUS) subcutaneous injection 30 Units 0.3 mL, HS    insulin lispro (HumALOG/ADMELOG) 100 units/mL subcutaneous injection 1-5 Units, HS    insulin lispro (HumALOG/ADMELOG) 100 units/mL subcutaneous injection 1-6 Units, TID AC **AND** Fingerstick Glucose (POCT), TID AC    lidocaine (LIDODERM) 5 % patch 1 patch, Daily    loperamide (IMODIUM) capsule 2 mg, 4x Daily PRN    methocarbamol (ROBAXIN) tablet 1,500 mg, Q6H PRN    [START ON 7/9/2025] methylPREDNISolone sodium succinate (Solu-MEDROL) injection 100 mg, Daily    metoclopramide (REGLAN) injection 10 mg, Q6H PRN    mirtazapine (REMERON) tablet 7.5 mg, HS    morphine injection 2 mg, Q4H PRN    pantoprazole (PROTONIX) EC tablet 40 mg, BID AC    promethazine (PHENERGAN) injection 25 mg, Q6H PRN    rimegepant sulfate (NURTEC) disintegrating tablet 75 mg, Daily PRN    rivaroxaban (XARELTO) tablet 15 mg, Daily With Breakfast    sodium bicarbonate 150 mEq in dextrose 5 % 1,000 mL infusion, Continuous, Last Rate: 100 mL/hr at 07/08/25 1153    tamsulosin (FLOMAX) capsule 0.4 mg, Daily With Dinner    traMADol (ULTRAM) tablet 100 mg, HS    Administrative Statements   Today, Patient Was Seen By: Margaret Arroyo MD  I have spent a total time of >35 minutes in caring for this patient on the day of the visit/encounter including Prognosis, Patient and family education, Risk factor reductions, Counseling / Coordination of care, Documenting in the medical record, Reviewing/placing orders in the medical record (including tests, medications, and/or procedures), and Communicating with other healthcare professionals .    **Please Note: This note may have been constructed using a voice recognition system.**

## 2025-07-08 NOTE — ASSESSMENT & PLAN NOTE
-- Present on admission, admission creatinine 2.97 mg/dL.  --Current creatinine with noted increase and currently at 4.26. Increased from yesterday at 3.98.  Patient aware of worsening kidney function.   --Hemodialysis reviewed with patient, consent signed.  Signed form placed in seventh floor folder at desk.  Uploaded via haiku into patient chart.  All questions addressed and answered.   --Last outpatient creatinine was 1.84 mg per  --Patient with underlying chronic kidney disease stage III  --During the first hospitalization renal function did improve back to baseline on July 4 to 1.6 mg/dL, now worsening up to 2.49 mg/dL  --Patient has been having some soft blood pressure with poor oral intake along with nausea.  --Hold SGLT2 inhibitor, HCTZ due to acute kidney injury  --Hold lisinopril as you currently  --UA: hyaline casts along with a few granular casts which could signify early ATN.  --Continue on sodium bicarb drip, rate increased earlier today.  Currently infusing at 100 mL/HR. slight increase noted in carbon dioxide  --Blood pressure stable.  Most recent charted blood pressure 120/66  --Magnesium and Phosphorus stable with this morning's labs  --UO has decreased.  Per the patient, appears more concentrated, still yellow.  Per epic charting, incontinent of urine, currently at 150 cc urine output.  No PVR noted   -Concern for worsening VIVIANA also d/t urinary retention/patient refusing straight cath  --Renal ultrasound as of 7/7/2025: Mild bilateral renal cortical thinning.  No hydronephrosis.  PVR of 893.  Per the results, patient declined to urinate.   --Patient continues to have poor appetite, not really tolerate oral intake  --At this time patient pending transfer to [facility] for surgical oncology and oncology to evaluate the patient for possible G-tube placement given ongoing poor oral intake and VIVIANA.  Patient aware, in agreement with plan.  Per PAC, accepted to [id number]. Awaiting pick  up time.   --At this time, no urgent need for HD/RRT

## 2025-07-08 NOTE — ASSESSMENT & PLAN NOTE
Lab Results   Component Value Date    HGBA1C 10.4 (H) 04/06/2025       Recent Labs     07/07/25  2030 07/08/25  0603 07/08/25  1148 07/08/25  1608   POCGLU 160* 158* 175* 198*       Blood Sugar Average: Last 72 hrs:  (P) [id number]    Uncontrolled in setting of recent steroid therapy  Patient follows with endocrinology  Currently prescribed Lantus 30 units  Initially marked hyperglycemia requiring insulin drip, now transitioned back to subcutaneous insulin as above  Due to patient's diet intolerance(patient reports not being able to eat any solid food and attempts to drink Glucerna diluted with milk, but reports a lot of discomfort during that), will liberate diet to regular and provide with different regular supplements to improve tolerance.

## 2025-07-08 NOTE — ASSESSMENT & PLAN NOTE
Follows with heme-onc Dr. Novak  On chemotherapy with fluorouracil, leucovorin, oxaliplatin, docetaxel every 14 days, completed 6/26, next cycle scheduled for 7/9  Patient reports of difficulty tolerating diet due to abdominal discomfort and nausea  No swallowing difficulties reported  GI has seen the patient in May 2025.  Reconsulted GI appreciate recs    - Had a detailed discussion with gastroenterology along with oncology and surgical oncology via secure chat.  Given patient's decreased oral intake, acute kidney injuries, other routes for nutrition supplementations were discussed with recommendations to transfer patient to [facility] for surgical oncology and oncology to evaluate the patient for for possible G-tube placement.  -Patient is in agreement with transfer  - Patient has been accepted awaiting bed and then transfer to [place]

## 2025-07-08 NOTE — ASSESSMENT & PLAN NOTE
This is a 60-year-old male patient with history of recently diagnosed esophageal cancer in May 2025, diabetes, recently diagnosed acute DVT, hypertension who started chemotherapy 6/26 and presents with p.o. intolerance, nausea, vomiting and diarrhea.  Found to have acute kidney injury with creatinine of 2.97 with baseline creatinine 1.3-1.8    Initially associated with hypotension responded to 2 L normal saline boluses  VIVIANA secondary to prerenal causes due to GI losses in setting of chemotherapy, urinary retention may be contributing  Creatinine is trending up  Patient noted for urinary retention but is declining catheterization, continue to monitor  On board currently patient on gentle hydration as per nephrology recommendation  Monitor BMP, I's and O's closely  Continue with IV fluids  After discussing with oncology and nephrology concerns of durvalumab mediated injury patient has been initiated on IV methylprednisone 100 mg daily in light of worsening kidney functions despite IV hydration and recent exposure to the durvalumab as per oncology.      Lab Results   Component Value Date    EGFR 13 07/08/2025    EGFR 15 07/07/2025    EGFR 26 07/06/2025    CREATININE 4.26 (H) 07/08/2025    CREATININE 3.98 (H) 07/07/2025    CREATININE 2.49 (H) 07/06/2025

## 2025-07-08 NOTE — QUICK NOTE
GI QUICK NOTE     GI following for acute nausea, vomiting, diarrhea thought to be secondary to chemotherapy related medication toxicity.  Oncology is following.  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  It was recommended that patient be transferred to [facility] for surgical oncology and oncology to evaluate the patient for possible G-tube placement given ongoing decreased oral intake and VIVIANA.  Patient has been accepted for transfer and is awaiting bed at [facility].  From a GI standpoint we recommend ongoing management per oncology/surgical oncology and supportive care.  No plans for further workup or evaluation at this time from a GI standpoint.  We will sign off.  Please reach out with any questions or concerns.      Rita Regalado PA-C

## 2025-07-08 NOTE — ASSESSMENT & PLAN NOTE
Lab Results   Component Value Date    HGBA1C 10.4 (H) 04/06/2025   -- Poorly controlled hemoglobin A1c 10.4  -- Hold SGLT2 inhibitor at this time due to volume depletion and VIVIANA    Recent Labs     07/07/25  1514 07/07/25  2030 07/08/25  0603 07/08/25  1148   POCGLU 187* 160* 158* 175*

## 2025-07-08 NOTE — ASSESSMENT & PLAN NOTE
Lab Results   Component Value Date    EGFR 13 07/08/2025    EGFR 15 07/07/2025    EGFR 26 07/06/2025    CREATININE 4.26 (H) 07/08/2025    CREATININE 3.98 (H) 07/07/2025    CREATININE 2.49 (H) 07/06/2025   Mr. Castillo continues to have progressive azotemia and worsening renal function despite initiating more regular parenteral hydration with IV fluids yesterday.  Today, his serum creatinine is 4.26 mg/dL with BUN of 75 mg/dL with a calculated GFR of 13 mL/min / 1.73 m². He received a first dose of the immune checkpoint inhibitor durvalumab (Imfinzi) 1500 mg intravenously on June 25, 2025 added to infusional 5-fluorouracil, oxaliplatin, and docetaxel (FLOT) chemotherapy.    The clinical presentation and course of the patient's acute kidney injury on top of chronic kidney disease is likely multifactorial.  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  While on full dose steroid therapy, the patient will need close monitoring of his serum glucose and ongoing treatment with insulin (sliding scale), a diabetic diet, and prophylaxis of gastric ulcers with a PPI.  While on systemic dosing of steroids, we will continue to closely monitor the patient's kidney function.  In addition, he will need IV hydration to treat a potential prerenal component of the patient's acute kidney injury (pre-renal azotemia).  I Will review the recommendations by the primary hospitalist and inpatient nephrology services.    I discussed the management of the patient's acute kidney injury on top of chronic kidney disease with the primary hospitalist Dr. Arroyo.    Plan:  Initiate today systemic steroid  therapy with prednisone 1 mg/Kg PO daily (or equivalent dose with IV methylprednisolone)  PPI prophylaxis for gastric ulcers  Close monitoring of serum glucose while the patient is on full dose steroid therapy.  Support with sliding scale insulin  Monitor for steroid-induced toxicity such as hypertension, hyperkalemia, acute psychiatric abnormalities, and others

## 2025-07-08 NOTE — TELEPHONE ENCOUNTER
Message left for patient in regards to appointment on 7/22/25 at 2:30 with Dr. Hamilton being canceled due to provider in surgery.  Asked patient to call office back to reschedule. Naomit message and letter sent to patient as well.

## 2025-07-08 NOTE — ASSESSMENT & PLAN NOTE
-- Last calcium 7.6, corrects to 8.6 with most recent albumin  --Ionized Calcium: 1.03  --Monitor calcium, it continues to drop recommend Calcium gluconate IV replacement

## 2025-07-08 NOTE — PLAN OF CARE
Problem: PAIN - ADULT  Goal: Verbalizes/displays adequate comfort level or baseline comfort level  Description: Interventions:  - Encourage patient to monitor pain and request assistance  - Assess pain using appropriate pain scale  - Administer analgesics as ordered based on type and severity of pain and evaluate response  - Implement non-pharmacological measures as appropriate and evaluate response  - Consider cultural and social influences on pain and pain management  - Notify physician/advanced practitioner if interventions unsuccessful or patient reports new pain  - Educate patient/family on pain management process including their role and importance of  reporting pain   - Provide non-pharmacologic/complimentary pain relief interventions  Outcome: Progressing     Problem: METABOLIC, FLUID AND ELECTROLYTES - ADULT  Goal: Electrolytes maintained within normal limits  Description: INTERVENTIONS:  - Monitor labs and assess patient for signs and symptoms of electrolyte imbalances  - Administer electrolyte replacement as ordered  - Monitor response to electrolyte replacements, including repeat lab results as appropriate  - Instruct patient on fluid and nutrition as appropriate  Outcome: Progressing  Goal: Fluid balance maintained  Description: INTERVENTIONS:  - Monitor labs   - Monitor I/O and WT  - Instruct patient on fluid and nutrition as appropriate  - Assess for signs & symptoms of volume excess or deficit  Outcome: Progressing  Goal: Glucose maintained within target range  Description: INTERVENTIONS:  - Monitor Blood Glucose as ordered  - Assess for signs and symptoms of hyperglycemia and hypoglycemia  - Administer ordered medications to maintain glucose within target range  - Assess nutritional intake and initiate nutrition service referral as needed  Outcome: Progressing     Problem: HEMATOLOGIC - ADULT  Goal: Maintains hematologic stability  Description: INTERVENTIONS  - Assess for signs and symptoms of  bleeding or hemorrhage  - Monitor labs  - Administer supportive blood products/factors as ordered and appropriate  Outcome: Progressing

## 2025-07-08 NOTE — ASSESSMENT & PLAN NOTE
Lab Results   Component Value Date    EGFR 13 07/08/2025    EGFR 15 07/07/2025    EGFR 26 07/06/2025    CREATININE 4.26 (H) 07/08/2025    CREATININE 3.98 (H) 07/07/2025    CREATININE 2.49 (H) 07/06/2025     Chronic kidney disease stage IIIB  -- Baseline creatinine historically 1.3 to 1.6 mg/dL  --No outpatient follow-up  --Recently baseline creatinine has been worsening prior to admission and more closer to mid 1's recently

## 2025-07-08 NOTE — PLAN OF CARE
Problem: Potential for Falls  Goal: Patient will remain free of falls  Description: INTERVENTIONS:  - Educate patient/family on patient safety including physical limitations  - Instruct patient to call for assistance with activity   - Consider consulting OT/PT to assist with strengthening/mobility based on AM PAC & JH-HLM score  - Consult OT/PT to assist with strengthening/mobility   - Keep Call bell within reach  - Keep bed low and locked with side rails adjusted as appropriate  - Keep care items and personal belongings within reach  - Initiate and maintain comfort rounds  - Make Fall Risk Sign visible to staff  - Apply yellow socks and bracelet for high fall risk patients  - Consider moving patient to room near nurses station  Outcome: Progressing     Problem: PAIN - ADULT  Goal: Verbalizes/displays adequate comfort level or baseline comfort level  Description: Interventions:  - Encourage patient to monitor pain and request assistance  - Assess pain using appropriate pain scale  - Administer analgesics as ordered based on type and severity of pain and evaluate response  - Implement non-pharmacological measures as appropriate and evaluate response  - Consider cultural and social influences on pain and pain management  - Notify physician/advanced practitioner if interventions unsuccessful or patient reports new pain  - Educate patient/family on pain management process including their role and importance of  reporting pain   - Provide non-pharmacologic/complimentary pain relief interventions  Outcome: Progressing     Problem: METABOLIC, FLUID AND ELECTROLYTES - ADULT  Goal: Electrolytes maintained within normal limits  Description: INTERVENTIONS:  - Monitor labs and assess patient for signs and symptoms of electrolyte imbalances  - Administer electrolyte replacement as ordered  - Monitor response to electrolyte replacements, including repeat lab results as appropriate  - Instruct patient on fluid and nutrition as  appropriate  Outcome: Progressing     Problem: METABOLIC, FLUID AND ELECTROLYTES - ADULT  Goal: Fluid balance maintained  Description: INTERVENTIONS:  - Monitor labs   - Monitor I/O and WT  - Instruct patient on fluid and nutrition as appropriate  - Assess for signs & symptoms of volume excess or deficit  Outcome: Progressing     Problem: METABOLIC, FLUID AND ELECTROLYTES - ADULT  Goal: Glucose maintained within target range  Description: INTERVENTIONS:  - Monitor Blood Glucose as ordered  - Assess for signs and symptoms of hyperglycemia and hypoglycemia  - Administer ordered medications to maintain glucose within target range  - Assess nutritional intake and initiate nutrition service referral as needed  Outcome: Progressing     Problem: GENITOURINARY - ADULT  Goal: Maintains or returns to baseline urinary function  Description: INTERVENTIONS:  - Assess urinary function  - Encourage oral fluids to ensure adequate hydration if ordered  - Administer ordered medications as needed  - Offer frequent toileting  - Follow urinary retention protocol if ordered  Outcome: Progressing     Problem: GENITOURINARY - ADULT  Goal: Absence of urinary retention  Description: INTERVENTIONS:  - Assess patient’s ability to void and empty bladder  - Monitor I/O  - Bladder scan as needed  - Discuss with physician/AP medications to alleviate retention as needed  - Discuss catheterization for long term situations as appropriate  Outcome: Progressing     Problem: Nutrition/Hydration-ADULT  Goal: Nutrient/Hydration intake appropriate for improving, restoring or maintaining nutritional needs  Description: Monitor and assess patient's nutrition/hydration status for malnutrition. Collaborate with interdisciplinary team and initiate plan and interventions as ordered.  Monitor patient's weight and dietary intake as ordered or per policy. Utilize nutrition screening tool and intervene as necessary. Determine patient's food preferences and provide  high-protein, high-caloric foods as appropriate.     INTERVENTIONS:  - Monitor oral intake, urinary output, labs, and treatment plans  - Assess nutrition and hydration status and recommend course of action  - Evaluate amount of meals eaten  - Assist patient with eating if necessary   - Allow adequate time for meals  - Recommend/ encourage appropriate diets, oral nutritional supplements, and vitamin/mineral supplements  - Order, calculate, and assess calorie counts as needed  - Recommend, monitor, and adjust tube feedings and TPN/PPN based on assessed needs  - Assess need for intravenous fluids  - Provide specific nutrition/hydration education as appropriate  - Include patient/family/caregiver in decisions related to nutrition  Outcome: Progressing

## 2025-07-08 NOTE — ASSESSMENT & PLAN NOTE
--Sodium 131 today has been chronic since February  --Discontinue hydrochlorothiazide and avoid going forward in the future  --Glucose 158  --Urine sodium 30  --Likely component of poor oral intake/ongoing pain/cancer dx  --Monitor on BMP

## 2025-07-08 NOTE — PROGRESS NOTES
Progress Note - Oncology-Medical   Name: Yoni Castillo 60 y.o. male I MRN: [id number]  Unit/Bed#: 7T [facility] 706-01 I Date of Admission: 7/1/2025   Date of Service: 7/8/2025 I Hospital Day: 7     Assessment & Plan  Acute kidney injury superimposed on stage 3a chronic kidney disease (HCC)  Lab Results   Component Value Date    EGFR 13 07/08/2025    EGFR 15 07/07/2025    EGFR 26 07/06/2025    CREATININE 4.26 (H) 07/08/2025    CREATININE 3.98 (H) 07/07/2025    CREATININE 2.49 (H) 07/06/2025   Mr. Castillo continues to have progressive azotemia and worsening renal function despite initiating more regular parenteral hydration with IV fluids yesterday.  Today, his serum creatinine is 4.26 mg/dL with BUN of 75 mg/dL with a calculated GFR of 13 mL/min / 1.73 m². He received a first dose of the immune checkpoint inhibitor durvalumab (Imfinzi) 1500 mg intravenously on June 25, 2025 added to infusional 5-fluorouracil, oxaliplatin, and docetaxel (FLOT) chemotherapy.    The clinical presentation and course of the patient's acute kidney injury on top of chronic kidney disease is likely multifactorial.  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  While on full dose steroid therapy, the patient will need close monitoring of his serum glucose and ongoing treatment with insulin (sliding scale), a diabetic diet, and prophylaxis of gastric ulcers with a PPI.  While on systemic dosing of steroids, we will continue to closely monitor the patient's kidney function.  In addition, he will need IV hydration to treat a potential prerenal component of the patient's acute kidney injury (pre-renal azotemia).   I Will review the recommendations by the primary hospitalist and inpatient nephrology services.    I discussed the management of the patient's acute kidney injury on top of chronic kidney disease with the primary hospitalist Dr. Arroyo.    Plan:  Initiate today systemic steroid therapy with prednisone 1 mg/Kg PO daily (or equivalent dose with IV methylprednisolone)  PPI prophylaxis for gastric ulcers  Close monitoring of serum glucose while the patient is on full dose steroid therapy.  Support with sliding scale insulin  Monitor for steroid-induced toxicity such as hypertension, hyperkalemia, acute psychiatric abnormalities, and others  Essential hypertension  Management by the primary hospitalist service  Type 2 diabetes mellitus with chronic kidney disease, without long-term current use of insulin ([facility])  Lab Results   Component Value Date    HGBA1C 10.4 (H) 04/06/2025   Management by the primary hospitalist service    Recent Labs     07/07/25  1128 07/07/25  1514 07/07/25  2030 07/08/25  0603   POCGLU 192* 187* 160* 158*       Blood Sugar Average: Last 72 hrs:  (P) 202.8183275620626625    Chronic kidney disease, stage 3 ([facility])  Lab Results   Component Value Date    EGFR 13 07/08/2025    EGFR 15 07/07/2025    EGFR 26 07/06/2025    CREATININE 4.26 (H) 07/08/2025    CREATININE 3.98 (H) 07/07/2025    CREATININE 2.49 (H) 07/06/2025     Malignant neoplasm of lower third of esophagus (HCC)  For now hold neoadjuvant FLOT plus durvalumab until patient has improvement of acute kidney injury as well as chemotherapy related mucositis.  Nausea vomiting and diarrhea  Improving.  Continue best supportive care.  The clinical presentation clinical course of diarrhea is consistent with chemotherapy induced mucositis and not suggestive of immune mediated colitis caused by durvalumab.    Call me with additional questions as needed.    Daryl Novak MD

## 2025-07-09 ENCOUNTER — APPOINTMENT (INPATIENT)
Dept: RADIOLOGY | Facility: HOSPITAL | Age: 60
DRG: 682 | End: 2025-07-09
Payer: MEDICARE

## 2025-07-09 ENCOUNTER — TRANSITIONAL CARE MANAGEMENT (OUTPATIENT)
Age: 60
End: 2025-07-09

## 2025-07-09 ENCOUNTER — HOSPITAL ENCOUNTER (INPATIENT)
Facility: HOSPITAL | Age: 60
LOS: 23 days | Discharge: HOME | DRG: 682 | End: 2025-08-01
Attending: INTERNAL MEDICINE | Admitting: INTERNAL MEDICINE
Payer: MEDICARE

## 2025-07-09 ENCOUNTER — HOSPITAL ENCOUNTER (OUTPATIENT)
Dept: INFUSION CENTER | Facility: HOSPITAL | Age: 60
Discharge: HOME | End: 2025-07-09
Attending: INTERNAL MEDICINE

## 2025-07-09 VITALS
RESPIRATION RATE: 24 BRPM | DIASTOLIC BLOOD PRESSURE: 58 MMHG | WEIGHT: 279.98 LBS | SYSTOLIC BLOOD PRESSURE: 78 MMHG | BODY MASS INDEX: 37.92 KG/M2 | OXYGEN SATURATION: 91 % | HEART RATE: 104 BPM | TEMPERATURE: 97.2 F | HEIGHT: 72 IN

## 2025-07-09 DIAGNOSIS — K22.89 ESOPHAGEAL MASS: ICD-10-CM

## 2025-07-09 DIAGNOSIS — N17.9 ACUTE KIDNEY INJURY SUPERIMPOSED ON STAGE 3A CHRONIC KIDNEY DISEASE (HCC): Primary | ICD-10-CM

## 2025-07-09 DIAGNOSIS — R05.8 NON-PRODUCTIVE COUGH: ICD-10-CM

## 2025-07-09 DIAGNOSIS — I80.9 SUPERFICIAL THROMBOPHLEBITIS: ICD-10-CM

## 2025-07-09 DIAGNOSIS — R78.81 BACTEREMIA: ICD-10-CM

## 2025-07-09 DIAGNOSIS — R50.9 FEVER, UNSPECIFIED FEVER CAUSE: ICD-10-CM

## 2025-07-09 DIAGNOSIS — C15.5 MALIGNANT NEOPLASM OF LOWER THIRD OF ESOPHAGUS (HCC): ICD-10-CM

## 2025-07-09 DIAGNOSIS — I82.412: ICD-10-CM

## 2025-07-09 DIAGNOSIS — R33.9 URINARY RETENTION: ICD-10-CM

## 2025-07-09 DIAGNOSIS — R13.19 OTHER DYSPHAGIA: ICD-10-CM

## 2025-07-09 DIAGNOSIS — N18.31 ACUTE KIDNEY INJURY SUPERIMPOSED ON STAGE 3A CHRONIC KIDNEY DISEASE (HCC): Primary | ICD-10-CM

## 2025-07-09 DIAGNOSIS — N39.0 UTI (URINARY TRACT INFECTION): ICD-10-CM

## 2025-07-09 PROBLEM — E87.6 HYPOKALEMIA: Status: ACTIVE | Noted: 2025-07-09

## 2025-07-09 PROBLEM — E87.29 HIGH ANION GAP METABOLIC ACIDOSIS: Status: ACTIVE | Noted: 2025-07-09

## 2025-07-09 PROBLEM — I95.9 HYPOTENSION: Status: ACTIVE | Noted: 2025-07-09

## 2025-07-09 PROBLEM — N18.32 STAGE 3B CHRONIC KIDNEY DISEASE (HCC): Status: ACTIVE | Noted: 2022-02-22

## 2025-07-09 PROBLEM — E83.39 HYPOPHOSPHATEMIA: Status: ACTIVE | Noted: 2025-07-09

## 2025-07-09 LAB
2HR DELTA HS TROPONIN: -6 NG/L
4HR DELTA HS TROPONIN: 7 NG/L
ALBUMIN SERPL BCG-MCNC: 3.1 G/DL (ref 3.5–5)
ALP SERPL-CCNC: 109 U/L (ref 34–104)
ALT SERPL W P-5'-P-CCNC: 13 U/L (ref 7–52)
ANION GAP SERPL CALCULATED.3IONS-SCNC: 17 MMOL/L (ref 4–13)
APTT PPP: 44 SECONDS (ref 23–34)
AST SERPL W P-5'-P-CCNC: 17 U/L (ref 13–39)
ATRIAL RATE: 115 BPM
BASE EX.OXY STD BLDV CALC-SCNC: 71.5 % (ref 60–80)
BASE EX.OXY STD BLDV CALC-SCNC: 92.3 % (ref 60–80)
BASE EXCESS BLDV CALC-SCNC: -6.2 MMOL/L
BASE EXCESS BLDV CALC-SCNC: -6.3 MMOL/L
BILIRUB DIRECT SERPL-MCNC: 0.61 MG/DL (ref 0–0.2)
BILIRUB SERPL-MCNC: 1 MG/DL (ref 0.2–1)
BNP SERPL-MCNC: 299 PG/ML (ref 0–100)
BUN SERPL-MCNC: 81 MG/DL (ref 5–25)
CALCIUM SERPL-MCNC: 7.6 MG/DL (ref 8.4–10.2)
CARDIAC TROPONIN I PNL SERPL HS: 20 NG/L (ref ?–50)
CARDIAC TROPONIN I PNL SERPL HS: 26 NG/L (ref ?–50)
CARDIAC TROPONIN I PNL SERPL HS: 33 NG/L (ref ?–50)
CHLORIDE SERPL-SCNC: 93 MMOL/L (ref 96–108)
CO2 SERPL-SCNC: 18 MMOL/L (ref 21–32)
CREAT SERPL-MCNC: 4.06 MG/DL (ref 0.6–1.3)
ERYTHROCYTE [DISTWIDTH] IN BLOOD BY AUTOMATED COUNT: 12.6 % (ref 11.6–15.1)
FLUAV RNA RESP QL NAA+PROBE: NEGATIVE
FLUBV RNA RESP QL NAA+PROBE: NEGATIVE
GFR SERPL CREATININE-BSD FRML MDRD: 14 ML/MIN/1.73SQ M
GLUCOSE SERPL-MCNC: 151 MG/DL (ref 65–140)
GLUCOSE SERPL-MCNC: 186 MG/DL (ref 65–140)
GLUCOSE SERPL-MCNC: 239 MG/DL (ref 65–140)
GLUCOSE SERPL-MCNC: 255 MG/DL (ref 65–140)
GLUCOSE SERPL-MCNC: 260 MG/DL (ref 65–140)
GLUCOSE SERPL-MCNC: 329 MG/DL (ref 65–140)
GLUCOSE SERPL-MCNC: 391 MG/DL (ref 65–140)
GLUCOSE SERPL-MCNC: 417 MG/DL (ref 65–140)
GLUCOSE SERPL-MCNC: 430 MG/DL (ref 65–140)
HCO3 BLDV-SCNC: 18.2 MMOL/L (ref 24–30)
HCO3 BLDV-SCNC: 20.2 MMOL/L (ref 24–30)
HCT VFR BLD AUTO: 33.4 % (ref 36.5–49.3)
HGB BLD-MCNC: 10.9 G/DL (ref 12–17)
INR PPP: 4.12 (ref 0.85–1.19)
LACTATE SERPL-SCNC: 1.5 MMOL/L (ref 0.5–2)
LACTATE SERPL-SCNC: 2.2 MMOL/L (ref 0.5–2)
LACTATE SERPL-SCNC: 8.6 MMOL/L (ref 0.5–2)
MAGNESIUM SERPL-MCNC: 1.9 MG/DL (ref 1.9–2.7)
MCH RBC QN AUTO: 29.1 PG (ref 26.8–34.3)
MCHC RBC AUTO-ENTMCNC: 32.6 G/DL (ref 31.4–37.4)
MCV RBC AUTO: 89 FL (ref 82–98)
NASAL CANNULA: 4
O2 CT BLDV-SCNC: 14.8 ML/DL
O2 CT BLDV-SCNC: 9.8 ML/DL
P AXIS: 47 DEGREES
PCO2 BLDV: 32.2 MM HG (ref 42–50)
PCO2 BLDV: 44.5 MM HG (ref 42–50)
PH BLDV: 7.28 [PH] (ref 7.3–7.4)
PH BLDV: 7.37 [PH] (ref 7.3–7.4)
PHOSPHATE SERPL-MCNC: 1.9 MG/DL (ref 2.3–4.1)
PLATELET # BLD AUTO: 83 THOUSANDS/UL (ref 149–390)
PMV BLD AUTO: 11.2 FL (ref 8.9–12.7)
PO2 BLDV: 39 MM HG (ref 35–45)
PO2 BLDV: 67.8 MM HG (ref 35–45)
POTASSIUM SERPL-SCNC: 3.4 MMOL/L (ref 3.5–5.3)
PR INTERVAL: 156 MS
PROCALCITONIN SERPL-MCNC: 51.53 NG/ML
PROT SERPL-MCNC: 5.8 G/DL (ref 6.4–8.4)
PROTHROMBIN TIME: 39.1 SECONDS (ref 12.3–15)
QRS AXIS: 18 DEGREES
QRSD INTERVAL: 96 MS
QT INTERVAL: 330 MS
QTC INTERVAL: 457 MS
RBC # BLD AUTO: 3.75 MILLION/UL (ref 3.88–5.62)
RSV RNA RESP QL NAA+PROBE: NEGATIVE
SARS-COV-2 RNA RESP QL NAA+PROBE: NEGATIVE
SODIUM SERPL-SCNC: 128 MMOL/L (ref 135–147)
T WAVE AXIS: 84 DEGREES
TSH 30M P TRH SERPL-ACNC: 0.43 UIU/ML
VENTRICULAR RATE: 115 BPM
WBC # BLD AUTO: 9.4 THOUSAND/UL (ref 4.31–10.16)

## 2025-07-09 PROCEDURE — 84145 PROCALCITONIN (PCT): CPT

## 2025-07-09 PROCEDURE — 87186 SC STD MICRODIL/AGAR DIL: CPT

## 2025-07-09 PROCEDURE — 0241U HB NFCT DS VIR RESP RNA 4 TRGT: CPT

## 2025-07-09 PROCEDURE — 80048 BASIC METABOLIC PNL TOTAL CA: CPT | Performed by: INTERNAL MEDICINE

## 2025-07-09 PROCEDURE — 83880 ASSAY OF NATRIURETIC PEPTIDE: CPT

## 2025-07-09 PROCEDURE — 87205 SMEAR GRAM STAIN: CPT

## 2025-07-09 PROCEDURE — 93010 ELECTROCARDIOGRAM REPORT: CPT | Performed by: STUDENT IN AN ORGANIZED HEALTH CARE EDUCATION/TRAINING PROGRAM

## 2025-07-09 PROCEDURE — 84484 ASSAY OF TROPONIN QUANT: CPT

## 2025-07-09 PROCEDURE — 99233 SBSQ HOSP IP/OBS HIGH 50: CPT | Performed by: INTERNAL MEDICINE

## 2025-07-09 PROCEDURE — 83605 ASSAY OF LACTIC ACID: CPT

## 2025-07-09 PROCEDURE — 87154 CUL TYP ID BLD PTHGN 6+ TRGT: CPT

## 2025-07-09 PROCEDURE — 93005 ELECTROCARDIOGRAM TRACING: CPT

## 2025-07-09 PROCEDURE — 85610 PROTHROMBIN TIME: CPT | Performed by: STUDENT IN AN ORGANIZED HEALTH CARE EDUCATION/TRAINING PROGRAM

## 2025-07-09 PROCEDURE — 99291 CRITICAL CARE FIRST HOUR: CPT | Performed by: INTERNAL MEDICINE

## 2025-07-09 PROCEDURE — 84100 ASSAY OF PHOSPHORUS: CPT | Performed by: INTERNAL MEDICINE

## 2025-07-09 PROCEDURE — 82948 REAGENT STRIP/BLOOD GLUCOSE: CPT

## 2025-07-09 PROCEDURE — 71045 X-RAY EXAM CHEST 1 VIEW: CPT

## 2025-07-09 PROCEDURE — 82805 BLOOD GASES W/O2 SATURATION: CPT

## 2025-07-09 PROCEDURE — 74176 CT ABD & PELVIS W/O CONTRAST: CPT

## 2025-07-09 PROCEDURE — 87086 URINE CULTURE/COLONY COUNT: CPT | Performed by: STUDENT IN AN ORGANIZED HEALTH CARE EDUCATION/TRAINING PROGRAM

## 2025-07-09 PROCEDURE — NC001 PR NO CHARGE: Performed by: INTERNAL MEDICINE

## 2025-07-09 PROCEDURE — 71250 CT THORAX DX C-: CPT

## 2025-07-09 PROCEDURE — 99239 HOSP IP/OBS DSCHRG MGMT >30: CPT

## 2025-07-09 PROCEDURE — 87040 BLOOD CULTURE FOR BACTERIA: CPT

## 2025-07-09 PROCEDURE — 84443 ASSAY THYROID STIM HORMONE: CPT

## 2025-07-09 PROCEDURE — 85027 COMPLETE CBC AUTOMATED: CPT

## 2025-07-09 PROCEDURE — 83605 ASSAY OF LACTIC ACID: CPT | Performed by: STUDENT IN AN ORGANIZED HEALTH CARE EDUCATION/TRAINING PROGRAM

## 2025-07-09 PROCEDURE — 85730 THROMBOPLASTIN TIME PARTIAL: CPT | Performed by: STUDENT IN AN ORGANIZED HEALTH CARE EDUCATION/TRAINING PROGRAM

## 2025-07-09 PROCEDURE — 80076 HEPATIC FUNCTION PANEL: CPT

## 2025-07-09 PROCEDURE — 83735 ASSAY OF MAGNESIUM: CPT | Performed by: INTERNAL MEDICINE

## 2025-07-09 PROCEDURE — 87077 CULTURE AEROBIC IDENTIFY: CPT

## 2025-07-09 PROCEDURE — NC001 PR NO CHARGE: Performed by: STUDENT IN AN ORGANIZED HEALTH CARE EDUCATION/TRAINING PROGRAM

## 2025-07-09 RX ORDER — HEPARIN SODIUM 1000 [USP'U]/ML
5000 INJECTION, SOLUTION INTRAVENOUS; SUBCUTANEOUS EVERY 6 HOURS PRN
Status: DISCONTINUED | OUTPATIENT
Start: 2025-07-09 | End: 2025-07-09

## 2025-07-09 RX ORDER — METHYLPREDNISOLONE SODIUM SUCCINATE 125 MG/2ML
100 INJECTION, POWDER, LYOPHILIZED, FOR SOLUTION INTRAMUSCULAR; INTRAVENOUS DAILY
Status: CANCELLED | OUTPATIENT
Start: 2025-07-09

## 2025-07-09 RX ORDER — ACETAMINOPHEN 325 MG/1
650 TABLET ORAL EVERY 6 HOURS PRN
Status: DISCONTINUED | OUTPATIENT
Start: 2025-07-09 | End: 2025-07-11

## 2025-07-09 RX ORDER — INSULIN LISPRO 100 [IU]/ML
1-6 INJECTION, SOLUTION INTRAVENOUS; SUBCUTANEOUS
Status: DISCONTINUED | OUTPATIENT
Start: 2025-07-09 | End: 2025-07-09

## 2025-07-09 RX ORDER — INSULIN LISPRO 100 [IU]/ML
1-5 INJECTION, SOLUTION INTRAVENOUS; SUBCUTANEOUS
Status: DISCONTINUED | OUTPATIENT
Start: 2025-07-09 | End: 2025-07-09

## 2025-07-09 RX ORDER — METOCLOPRAMIDE HYDROCHLORIDE 5 MG/ML
10 INJECTION INTRAMUSCULAR; INTRAVENOUS EVERY 6 HOURS PRN
Status: DISCONTINUED | OUTPATIENT
Start: 2025-07-09 | End: 2025-07-09

## 2025-07-09 RX ORDER — METHOCARBAMOL 750 MG/1
750 TABLET, FILM COATED ORAL EVERY 6 HOURS PRN
Status: DISCONTINUED | OUTPATIENT
Start: 2025-07-09 | End: 2025-08-01 | Stop reason: HOSPADM

## 2025-07-09 RX ORDER — SODIUM BICARBONATE 650 MG/1
650 TABLET ORAL
Status: DISCONTINUED | OUTPATIENT
Start: 2025-07-09 | End: 2025-07-14

## 2025-07-09 RX ORDER — METOCLOPRAMIDE HYDROCHLORIDE 5 MG/ML
10 INJECTION INTRAMUSCULAR; INTRAVENOUS EVERY 8 HOURS PRN
Status: DISCONTINUED | OUTPATIENT
Start: 2025-07-09 | End: 2025-08-01 | Stop reason: HOSPADM

## 2025-07-09 RX ORDER — IPRATROPIUM BROMIDE AND ALBUTEROL SULFATE 2.5; .5 MG/3ML; MG/3ML
3 SOLUTION RESPIRATORY (INHALATION)
Status: DISCONTINUED | OUTPATIENT
Start: 2025-07-09 | End: 2025-07-09

## 2025-07-09 RX ORDER — HEPARIN SODIUM 10000 [USP'U]/100ML
3-30 INJECTION, SOLUTION INTRAVENOUS
Status: DISCONTINUED | OUTPATIENT
Start: 2025-07-09 | End: 2025-07-09

## 2025-07-09 RX ORDER — AMMONIUM LACTATE 12 G/100G
LOTION TOPICAL 2 TIMES DAILY PRN
Status: DISCONTINUED | OUTPATIENT
Start: 2025-07-09 | End: 2025-08-01 | Stop reason: HOSPADM

## 2025-07-09 RX ORDER — LOPERAMIDE HYDROCHLORIDE 2 MG/1
2 CAPSULE ORAL 4 TIMES DAILY PRN
Status: DISCONTINUED | OUTPATIENT
Start: 2025-07-09 | End: 2025-07-09

## 2025-07-09 RX ORDER — ALBUMIN (HUMAN) 12.5 G/50ML
25 SOLUTION INTRAVENOUS ONCE
Status: COMPLETED | OUTPATIENT
Start: 2025-07-09 | End: 2025-07-09

## 2025-07-09 RX ORDER — HYDROCHLOROTHIAZIDE 12.5 MG/1
12.5 TABLET ORAL 2 TIMES DAILY
Status: DISCONTINUED | OUTPATIENT
Start: 2025-07-09 | End: 2025-07-10

## 2025-07-09 RX ORDER — FENTANYL CITRATE 50 UG/ML
25 INJECTION, SOLUTION INTRAMUSCULAR; INTRAVENOUS ONCE
Refills: 0 | Status: DISCONTINUED | OUTPATIENT
Start: 2025-07-09 | End: 2025-07-09

## 2025-07-09 RX ORDER — HYDROCORTISONE 25 MG/G
CREAM TOPICAL 4 TIMES DAILY PRN
Status: DISCONTINUED | OUTPATIENT
Start: 2025-07-09 | End: 2025-07-15

## 2025-07-09 RX ORDER — SODIUM CHLORIDE, SODIUM GLUCONATE, SODIUM ACETATE, POTASSIUM CHLORIDE, MAGNESIUM CHLORIDE, SODIUM PHOSPHATE, DIBASIC, AND POTASSIUM PHOSPHATE .53; .5; .37; .037; .03; .012; .00082 G/100ML; G/100ML; G/100ML; G/100ML; G/100ML; G/100ML; G/100ML
75 INJECTION, SOLUTION INTRAVENOUS CONTINUOUS
Status: CANCELLED | OUTPATIENT
Start: 2025-07-09

## 2025-07-09 RX ORDER — TAMSULOSIN HYDROCHLORIDE 0.4 MG/1
0.4 CAPSULE ORAL
Status: DISCONTINUED | OUTPATIENT
Start: 2025-07-09 | End: 2025-08-01 | Stop reason: HOSPADM

## 2025-07-09 RX ORDER — MIRTAZAPINE 15 MG/1
7.5 TABLET, FILM COATED ORAL
Status: DISCONTINUED | OUTPATIENT
Start: 2025-07-09 | End: 2025-08-01 | Stop reason: HOSPADM

## 2025-07-09 RX ORDER — CHLORHEXIDINE GLUCONATE ORAL RINSE 1.2 MG/ML
15 SOLUTION DENTAL EVERY 12 HOURS SCHEDULED
Status: DISCONTINUED | OUTPATIENT
Start: 2025-07-09 | End: 2025-07-10

## 2025-07-09 RX ORDER — IPRATROPIUM BROMIDE AND ALBUTEROL SULFATE 2.5; .5 MG/3ML; MG/3ML
3 SOLUTION RESPIRATORY (INHALATION) ONCE
Status: DISCONTINUED | OUTPATIENT
Start: 2025-07-09 | End: 2025-07-09 | Stop reason: HOSPADM

## 2025-07-09 RX ORDER — PROMETHAZINE HYDROCHLORIDE 25 MG/ML
25 INJECTION, SOLUTION INTRAMUSCULAR; INTRAVENOUS EVERY 6 HOURS PRN
Status: DISCONTINUED | OUTPATIENT
Start: 2025-07-09 | End: 2025-07-09

## 2025-07-09 RX ORDER — SODIUM BICARBONATE 650 MG/1
650 TABLET ORAL
Status: CANCELLED | OUTPATIENT
Start: 2025-07-09

## 2025-07-09 RX ORDER — HYDROMORPHONE HCL IN WATER/PF 6 MG/30 ML
0.2 PATIENT CONTROLLED ANALGESIA SYRINGE INTRAVENOUS
Status: DISCONTINUED | OUTPATIENT
Start: 2025-07-09 | End: 2025-08-01 | Stop reason: HOSPADM

## 2025-07-09 RX ORDER — HEPARIN SODIUM 1000 [USP'U]/ML
10000 INJECTION, SOLUTION INTRAVENOUS; SUBCUTANEOUS EVERY 6 HOURS PRN
Status: DISCONTINUED | OUTPATIENT
Start: 2025-07-09 | End: 2025-07-09

## 2025-07-09 RX ORDER — INSULIN GLARGINE 100 [IU]/ML
30 INJECTION, SOLUTION SUBCUTANEOUS
Status: DISCONTINUED | OUTPATIENT
Start: 2025-07-09 | End: 2025-07-09

## 2025-07-09 RX ORDER — NOREPINEPHRINE BITARTRATE 1 MG/ML
INJECTION, SOLUTION INTRAVENOUS
Status: DISPENSED
Start: 2025-07-09 | End: 2025-07-09

## 2025-07-09 RX ORDER — PANTOPRAZOLE SODIUM 40 MG/1
40 TABLET, DELAYED RELEASE ORAL
Status: DISCONTINUED | OUTPATIENT
Start: 2025-07-09 | End: 2025-08-01 | Stop reason: HOSPADM

## 2025-07-09 RX ORDER — SODIUM CHLORIDE, SODIUM GLUCONATE, SODIUM ACETATE, POTASSIUM CHLORIDE, MAGNESIUM CHLORIDE, SODIUM PHOSPHATE, DIBASIC, AND POTASSIUM PHOSPHATE .53; .5; .37; .037; .03; .012; .00082 G/100ML; G/100ML; G/100ML; G/100ML; G/100ML; G/100ML; G/100ML
75 INJECTION, SOLUTION INTRAVENOUS CONTINUOUS
Status: DISCONTINUED | OUTPATIENT
Start: 2025-07-09 | End: 2025-07-09

## 2025-07-09 RX ORDER — LIDOCAINE 50 MG/G
1 PATCH TOPICAL DAILY
Status: DISCONTINUED | OUTPATIENT
Start: 2025-07-10 | End: 2025-08-01 | Stop reason: HOSPADM

## 2025-07-09 RX ORDER — TRAMADOL HYDROCHLORIDE 50 MG/1
100 TABLET ORAL
Status: DISCONTINUED | OUTPATIENT
Start: 2025-07-09 | End: 2025-08-01 | Stop reason: HOSPADM

## 2025-07-09 RX ORDER — SODIUM BICARBONATE 650 MG/1
650 TABLET ORAL
Status: DISCONTINUED | OUTPATIENT
Start: 2025-07-09 | End: 2025-07-09 | Stop reason: HOSPADM

## 2025-07-09 RX ORDER — INSULIN LISPRO 100 [IU]/ML
5 INJECTION, SOLUTION INTRAVENOUS; SUBCUTANEOUS ONCE
Status: COMPLETED | OUTPATIENT
Start: 2025-07-09 | End: 2025-07-09

## 2025-07-09 RX ORDER — METHYLPREDNISOLONE SODIUM SUCCINATE 125 MG/2ML
100 INJECTION, POWDER, LYOPHILIZED, FOR SOLUTION INTRAMUSCULAR; INTRAVENOUS DAILY
Status: DISCONTINUED | OUTPATIENT
Start: 2025-07-09 | End: 2025-07-09 | Stop reason: HOSPADM

## 2025-07-09 RX ORDER — AMITRIPTYLINE HYDROCHLORIDE 10 MG/1
30 TABLET ORAL
Status: DISCONTINUED | OUTPATIENT
Start: 2025-07-09 | End: 2025-08-01 | Stop reason: HOSPADM

## 2025-07-09 RX ORDER — HYDROCORTISONE ACETATE 25 MG/1
25 SUPPOSITORY RECTAL 2 TIMES DAILY
Status: DISCONTINUED | OUTPATIENT
Start: 2025-07-09 | End: 2025-07-15

## 2025-07-09 RX ADMIN — HEPARIN SODIUM 18 UNITS/KG/HR: 10000 INJECTION, SOLUTION INTRAVENOUS at 17:07

## 2025-07-09 RX ADMIN — PIPERACILLIN AND TAZOBACTAM 4.5 G: 4; .5 INJECTION, POWDER, FOR SOLUTION INTRAVENOUS at 08:17

## 2025-07-09 RX ADMIN — PIPERACILLIN AND TAZOBACTAM 4.5 G: 36; 4.5 INJECTION, POWDER, FOR SOLUTION INTRAVENOUS at 12:29

## 2025-07-09 RX ADMIN — INSULIN LISPRO 5 UNITS: 100 INJECTION, SOLUTION INTRAVENOUS; SUBCUTANEOUS at 08:11

## 2025-07-09 RX ADMIN — SODIUM CHLORIDE, SODIUM LACTATE, POTASSIUM CHLORIDE, AND CALCIUM CHLORIDE 1000 ML: .6; .31; .03; .02 INJECTION, SOLUTION INTRAVENOUS at 06:52

## 2025-07-09 RX ADMIN — RIVAROXABAN 15 MG: 10 TABLET, FILM COATED ORAL at 08:17

## 2025-07-09 RX ADMIN — SODIUM CHLORIDE 4 UNITS/HR: 9 INJECTION, SOLUTION INTRAVENOUS at 15:00

## 2025-07-09 RX ADMIN — SODIUM BICARBONATE: 84 INJECTION INTRAVENOUS at 00:08

## 2025-07-09 RX ADMIN — PANTOPRAZOLE SODIUM 40 MG: 40 TABLET, DELAYED RELEASE ORAL at 08:09

## 2025-07-09 RX ADMIN — INSULIN LISPRO 5 UNITS: 100 INJECTION, SOLUTION INTRAVENOUS; SUBCUTANEOUS at 05:11

## 2025-07-09 RX ADMIN — ALBUMIN (HUMAN) 25 G: 0.25 INJECTION, SOLUTION INTRAVENOUS at 08:50

## 2025-07-09 RX ADMIN — METHYLPREDNISOLONE SODIUM SUCCINATE 100 MG: 125 INJECTION, POWDER, FOR SOLUTION INTRAMUSCULAR; INTRAVENOUS at 08:10

## 2025-07-09 RX ADMIN — ALBUMIN (HUMAN) 25 G: 0.25 INJECTION, SOLUTION INTRAVENOUS at 10:01

## 2025-07-09 RX ADMIN — ACETAMINOPHEN 650 MG: 325 TABLET, FILM COATED ORAL at 05:12

## 2025-07-09 RX ADMIN — CHLORHEXIDINE GLUCONATE 15 ML: 1.2 SOLUTION ORAL at 12:42

## 2025-07-09 RX ADMIN — NOREPINEPHRINE BITARTRATE 3 MCG/MIN: 1 INJECTION, SOLUTION, CONCENTRATE INTRAVENOUS at 12:36

## 2025-07-09 RX ADMIN — PIPERACILLIN AND TAZOBACTAM 4.5 G: 36; 4.5 INJECTION, POWDER, FOR SOLUTION INTRAVENOUS at 20:07

## 2025-07-09 RX ADMIN — SODIUM CHLORIDE, SODIUM GLUCONATE, SODIUM ACETATE, POTASSIUM CHLORIDE, MAGNESIUM CHLORIDE, SODIUM PHOSPHATE, DIBASIC, AND POTASSIUM PHOSPHATE 75 ML/HR: .53; .5; .37; .037; .03; .012; .00082 INJECTION, SOLUTION INTRAVENOUS at 09:25

## 2025-07-09 NOTE — NURSING NOTE
At approximately 0743am Sepsis alert was called. Patient was febrile, tachycardic, hypotensive, and desatting into low 80%. Sepsis protocol followed. Patient is currently on 6L of O2 via nasal canula and is refusing to wear O2. Pt. Is also refusing his meds. Sodium bicarb 650mg tab and Potassium-sodium Phosphates.     Provider notified. [facility]

## 2025-07-09 NOTE — ASSESSMENT & PLAN NOTE
This is a 60-year-old male patient with history of recently diagnosed esophageal cancer in May 2025, diabetes, recently diagnosed acute DVT, hypertension who started chemotherapy 6/26 and presents with p.o. intolerance, nausea, vomiting and diarrhea.  Found to have acute kidney injury with creatinine of 2.97 with baseline creatinine 1.3-1.8    Initially associated with hypotension responded to 2 L normal saline boluses  VIVIANA secondary to prerenal causes due to GI losses in setting of chemotherapy, urinary retention may be contributing  Creatinine had been continuously trending up but after initiating IV Solu-Medrol on 7/8/2025 patient's creatinine has somewhat stabilized instead of going up and currently is at 4.06 as compared to 7/8/2025 4.26.  Patient noted for urinary retention but is declining catheterization, continue to monitor  On board currently patient on gentle hydration as per nephrology recommendation  Monitor BMP, I's and O's closely  Continue with IV fluids  After discussing with oncology and nephrology concerns of durvalumab mediated injury patient has been initiated on IV methylprednisone 100 mg daily in light of worsening kidney functions despite IV hydration and recent exposure to the durvalumab as per oncology.  7/9/2025-in light of spike of fever earlier in this morning elevated procalcitonin and lactic acidosis methylprednisolone can be held or discontinued on clinical judgment on arrival to [facility] ICU      Lab Results   Component Value Date    EGFR 14 07/09/2025    EGFR 13 07/08/2025    EGFR 15 07/07/2025    CREATININE 4.06 (H) 07/09/2025    CREATININE 4.26 (H) 07/08/2025    CREATININE 3.98 (H) 07/07/2025

## 2025-07-09 NOTE — ASSESSMENT & PLAN NOTE
Lab Results   Component Value Date    HGBA1C 10.4 (H) 04/06/2025       Recent Labs     07/08/25  2110 07/09/25  0457 07/09/25  0551 07/09/25  0740   POCGLU 328* 430* 391* 329*       Blood Sugar Average: Last 72 hrs:  (P) 239.8868052791278367    Uncontrolled in setting of recent steroid therapy  Patient follows with endocrinology  Currently prescribed Lantus 30 units  Initially marked hyperglycemia requiring insulin drip, now transitioned back to subcutaneous insulin as above  Due to patient's diet intolerance(patient reports not being able to eat any solid food and attempts to drink Glucerna diluted with milk, but reports a lot of discomfort during that), will liberate diet to regular and provide with different regular supplements to improve tolerance.

## 2025-07-09 NOTE — SEPSIS NOTE
Sepsis Note   Yoni Castillo 60 y.o. male MRN: [id number]  Unit/Bed#: 7T [facility] 706-01 Encounter: [id number]       Initial Sepsis Screening       Row Name 07/09/25 0606 07/06/25 1032             Is the patient's history suggestive of a new or worsening infection? Yes (Proceed)  -AM No  -AR       Suspected source of infection suspect infection, source unknown  -AM --       Indicate SIRS criteria Hyperthemia > 38.3C (100.9F) OR Hypothermia <36C (96.8F);Tachycardia > 90 bpm;Tachypnea > 20 resp per min  -AM --       Are two or more of the above signs & symptoms of infection both present and new to the patient? Yes (Proceed)  -AM --       Assess for evidence of organ dysfunction: Are any of the below criteria present within 6 hours of suspected infection and SIRS criteria that are NOT considered to be chronic conditions? -- --                 User Key  (r) = Recorded By, (t) = Taken By, (c) = Cosigned By      Initials Name Provider Type    JAN Pollock Nurse Practitioner    LEANNA Morrissey DO Physician                   Initial Sepsis Screening       Row Name 07/09/25 0606 07/06/25 1032 07/02/25 1103             Initial Sepsis Assessment    Is the patient's history suggestive of a new or worsening infection? Yes (Proceed)  -AM No  -AR No  -PL      Suspected source of infection suspect infection, source unknown  -AM -- --      Indicate SIRS criteria Hyperthemia > 38.3C (100.9F) OR Hypothermia <36C (96.8F);Tachycardia > 90 bpm;Tachypnea > 20 resp per min  -AM -- --      Are two or more of the above signs & symptoms of infection both present and new to the patient? Yes (Proceed)  -AM -- --                User Key  (r) = Recorded By, (t) = Taken By, (c) = Cosigned By      Initials Name Provider Type    JAN Pollock Nurse Practitioner    LEANNA Morrissey DO Physician    PL Farnaz Loredo MD Physician                     Default Flowsheet Data (Last 720 Hours)        "Sepsis Reassess       Row Name 07/06/25 1032                   Repeat Volume Status and Tissue Perfusion Assessment Performed    Date of Reassessment: 07/06/25  -AR        Time of Reassessment: 1033  -AR        Sepsis Reassessment Note: Click \"NEXT\" below (NOT \"close\") to generate sepsis reassessment note. YES (proceed by clicking \"NEXT\")  -AR        Repeat Volume Status and Tissue Perfusion Assessment Performed --                  User Key  (r) = Recorded By, (t) = Taken By, (c) = Cosigned By      Initials Name Provider Type    AR Genia Morrissey DO Physician                    Body mass index is 37.97 kg/m².  Wt Readings from Last 1 Encounters:   07/09/25 127 kg (279 lb 15.8 oz)        Ideal body weight: 77.6 kg (171 lb 1.2 oz)  Adjusted ideal body weight: 97.4 kg (214 lb 10.2 oz)      Patient meeting sepsis criteria as evidenced by tachycardia and fever.  Source of infection unknown.  Patient currently hemodynamically stable was given 1 L fluid bolus of LR so far.  Awaiting further labs and CXR.  Patient was empirically given IV Zosyn.  Currently hemodynamically stable last blood pressure 100/58.  BPs have been running on the softer side.  "

## 2025-07-09 NOTE — ASSESSMENT & PLAN NOTE
Follows with heme-onc Dr. Novak  On chemotherapy with fluorouracil, leucovorin, oxaliplatin, docetaxel every 14 days, completed 6/26, next cycle scheduled for 7/9  Patient reports of difficulty tolerating diet due to abdominal discomfort and nausea  No swallowing difficulties reported  GI has seen the patient in May 2025.  Reconsulted GI appreciate recs    - Had a detailed discussion with gastroenterology along with oncology and surgical oncology via secure chat.  Given patient's decreased oral intake, acute kidney injuries, other routes for nutrition supplementations were discussed with recommendations to transfer patient to [facility] for surgical oncology and oncology to evaluate the patient for for possible G-tube placement.  -Patient is in agreement with transfer  - Patient has been accepted awaiting bed and then transfer to [place]

## 2025-07-09 NOTE — ASSESSMENT & PLAN NOTE
Earlier morning of 7/9/2025 patient spiked a fever 101, was hypoxic and became hypotensive.    - Patient met sepsis criteria and sepsis workup was done  - Patient was given IV fluids at that time by the on-call personal  - Procalcitonin elevated at 51 and lactic resulted trended from 2.2-8.6.  - Patient blood pressure continue to remain on the lower side status post 2 albumin 25 g.  - Chest x-ray ordered  - CAT scan chest abdomen pelvis could not be done due to arrival of the transportation  -Patient is being transferred to ICU bedtime

## 2025-07-09 NOTE — QUICK NOTE
Alerted by nursing that patient developed fever of 101.1, tachycardia, and hypoxia satting 85% on room air now requiring 6 L nasal cannula acutely.  Per nursing patient is alert and oriented x 4 does not appear respiratory distress and denies infectious symptoms.  Nursing states lung sounds are diminished and patient is coughing.  Patient reports this is secondary to chemo.  Fever has improved with Tylenol.     Plan  Obtain VBG, troponin, lactate, PCT, CBC, BNP  Obtain CXR  Obtain COVID/flu/RSV, urinalysis  Obtain EKG  Patient given x 1 DuoNeb  Give 1 L LR  Monitor VS Q30 min x 4

## 2025-07-09 NOTE — DISCHARGE SUMMARY
Discharge Summary - Hospitalist   Name: Yoni Castillo 60 y.o. male I MRN: [id number]  Unit/Bed#: 7T [facility] 706-01 I Date of Admission: 7/1/2025   Date of Service: 7/9/2025 I Hospital Day: 8     Assessment & Plan  Acute kidney injury superimposed on stage 3a chronic kidney disease (HCC)  This is a 60-year-old male patient with history of recently diagnosed esophageal cancer in May 2025, diabetes, recently diagnosed acute DVT, hypertension who started chemotherapy 6/26 and presents with p.o. intolerance, nausea, vomiting and diarrhea.  Found to have acute kidney injury with creatinine of 2.97 with baseline creatinine 1.3-1.8    Initially associated with hypotension responded to 2 L normal saline boluses  VIVIANA secondary to prerenal causes due to GI losses in setting of chemotherapy, urinary retention may be contributing  Creatinine had been continuously trending up but after initiating IV Solu-Medrol on 7/8/2025 patient's creatinine has somewhat stabilized instead of going up and currently is at 4.06 as compared to 7/8/2025 4.26.  Patient noted for urinary retention but is declining catheterization, continue to monitor  On board currently patient on gentle hydration as per nephrology recommendation  Monitor BMP, I's and O's closely  Continue with IV fluids  After discussing with oncology and nephrology concerns of durvalumab mediated injury patient has been initiated on IV methylprednisone 100 mg daily in light of worsening kidney functions despite IV hydration and recent exposure to the durvalumab as per oncology.  7/9/2025-in light of spike of fever earlier in this morning elevated procalcitonin and lactic acidosis methylprednisolone can be held or discontinued on clinical judgment on arrival to [facility] ICU      Lab Results   Component Value Date    EGFR 14 07/09/2025    EGFR 13 07/08/2025    EGFR 15 07/07/2025    CREATININE 4.06 (H) 07/09/2025    CREATININE 4.26 (H) 07/08/2025    CREATININE 3.98 (H)  07/07/2025     Essential hypertension  Patient presents with volume depletion, VIVIANA, hyponatremia  Hold lisinopril/hydrochlorothiazide/empagliflozin.    Type 2 diabetes mellitus with chronic kidney disease, without long-term current use of insulin ([facility])  Lab Results   Component Value Date    HGBA1C 10.4 (H) 04/06/2025       Recent Labs     07/08/25  2110 07/09/25  0457 07/09/25  0551 07/09/25  0740   POCGLU 328* 430* 391* 329*       Blood Sugar Average: Last 72 hrs:  (P) [id number]    Uncontrolled in setting of recent steroid therapy  Patient follows with endocrinology  Currently prescribed Lantus 30 units  Initially marked hyperglycemia requiring insulin drip, now transitioned back to subcutaneous insulin as above  Due to patient's diet intolerance(patient reports not being able to eat any solid food and attempts to drink Glucerna diluted with milk, but reports a lot of discomfort during that), will liberate diet to regular and provide with different regular supplements to improve tolerance.    Chronic kidney disease, stage 3 ([facility])  Lab Results   Component Value Date    EGFR 14 07/09/2025    EGFR 13 07/08/2025    EGFR 15 07/07/2025    CREATININE 4.06 (H) 07/09/2025    CREATININE 4.26 (H) 07/08/2025    CREATININE 3.98 (H) 07/07/2025   Baseline creatinine 1.4-1.8, presents with severe VIVIANA with creatinine of 2.97  Current creatinine worsened   Apology on board  Please see above under acute kidney injury  Opioid dependence, uncomplicated (HCC)  Maintained on tramadol -was held due to VIVIANA, however patient requests that it is resumed, 100 mg at bedtime  Discontinue oxycodone  Hyponatremia  Hypovolemic as well as pseudohyponatremia in setting of hyperglycemia  Sodium corrects to 133  Stable   Malignant neoplasm of lower third of esophagus (HCC)  Follows with heme-onc Dr. Novak  On chemotherapy with fluorouracil, leucovorin, oxaliplatin, docetaxel every 14 days, completed 6/26, next cycle scheduled for  7/9  Patient reports of difficulty tolerating diet due to abdominal discomfort and nausea  No swallowing difficulties reported  GI has seen the patient in May 2025.  Reconsulted GI appreciate recs    - Had a detailed discussion with gastroenterology along with oncology and surgical oncology via secure chat.  Given patient's decreased oral intake, acute kidney injuries, other routes for nutrition supplementations were discussed with recommendations to transfer patient to [facility] for surgical oncology and oncology to evaluate the patient for for possible G-tube placement.  -Patient is in agreement with transfer  - Patient has been accepted awaiting bed and then transfer to [place]    Deep vein thrombosis (DVT) of femoral vein of left lower extremity (HCC)  In setting of hypercoagulability of malignancy  Prescribed Xarelto for 6 months therapy, however has not been taking due to misunderstanding  Continue with after discussion with GI      Urinary retention  Associated with VIVIANA  Patient has declined catheterization  Continue to monitor PVR, encourage ambulation  Monitor BMP  Consider in versus short-term outpatient urology evaluation  Started Flomax, but patient refused it  Pancytopenia (HCC)  Mild  WBC trending down, continue to monitor  Likely chemotherapy induced  Monitor CBC  Also, mild drop in Hgb  Close monitoring due to reports of rectal bleeding  Nausea vomiting and diarrhea  Improving   Fever  Earlier morning of 7/9/2025 patient spiked a fever 101, was hypoxic and became hypotensive.    - Patient met sepsis criteria and sepsis workup was done  - Patient was given IV fluids at that time by the on-call personal  - Procalcitonin elevated at 51 and lactic resulted trended from 2.2-8.6.  - Patient blood pressure continue to remain on the lower side status post 2 albumin 25 g.  - Chest x-ray ordered  - CAT scan chest abdomen pelvis could not be done due to arrival of the transportation  -Patient is  being transferred to ICU bedtime     Medical Problems       Resolved Problems  Date Reviewed: 6/30/2025   None       Discharging Physician / Practitioner: Margaret Arroyo MD  PCP: Judd Ji MD  Admission Date:   Admission Orders (From admission, onward)       Ordered        07/01/25 1400  INPATIENT ADMISSION  Once                          Discharge Date: 07/09/25        Consultations During Hospital Stay:  Nephrology  Hematology oncology      Procedures Performed:   N/A    Significant Findings / Test Results:   XR chest 1 view portable  Result Date: 7/2/2025  Impression: No radiographic evidence of acute intrathoracic process on this examination which is somewhat limited by low lung volumes. Workstation performed: [id number]     CT chest without contrast  Result Date: 7/1/2025  Impression: No acute findings in the chest. Mild mural thickening of the distal esophagus in keeping with history of esophageal cancer. Borderline/minimally enlarged periportal and upper retroperitoneal lymph nodes, nonspecific, unchanged from previous examination. Computerized Assisted Algorithm (CAA) may have aided analysis of applicable images. Resident: SINGH LIZ I, the attending radiologist, have reviewed the images and agree with the final report above. Workstation performed: [id number]         Incidental Findings:   NA  Hospital Course:   Yoni Castillo is a 60 y.o. male patient who originally presented to the hospital on 7/1/2025 due to as above        Discharge Day Visit / Exam:   Subjective: Patient was seen and examined at bedside.  Alert and oriented  Vitals: Blood Pressure: (!) 78/58 (07/09/25 1002)  Pulse: 104 (07/09/25 1002)  Temperature: (!) 97.2 °F (36.2 °C) (07/09/25 1002)  Temp Source: Temporal (07/09/25 1002)  Respirations: (!) 24 (07/09/25 0826)  Height: 6' (182.9 cm) (07/01/25 1440)  Weight - Scale: 127 kg (279 lb 15.8 oz) (07/09/25 0541)  SpO2: 91 % (07/09/25 1002)  Physical Exam  Vitals and nursing  note reviewed.   Constitutional:       General: He is not in acute distress.     Appearance: He is well-developed. He is obese.   HENT:      Head: Normocephalic and atraumatic.     Eyes:      Conjunctiva/sclera: Conjunctivae normal.       Cardiovascular:      Rate and Rhythm: Normal rate and regular rhythm.      Heart sounds: No murmur heard.  Pulmonary:      Effort: Pulmonary effort is normal. No respiratory distress.      Breath sounds: Normal breath sounds.   Abdominal:      Palpations: Abdomen is soft.      Tenderness: There is no abdominal tenderness.     Musculoskeletal:         General: No swelling.      Cervical back: Neck supple.     Skin:     General: Skin is warm and dry.      Capillary Refill: Capillary refill takes less than 2 seconds.     Neurological:      Mental Status: He is alert and oriented to person, place, and time.     Psychiatric:         Mood and Affect: Mood normal.          Discussion with Family: Patient declined call to .     Discharge instructions/Information to patient and family:   See after visit summary for information provided to patient and family.      Provisions for Follow-Up Care:  See after visit summary for information related to follow-up care and any pertinent home health orders.      Mobility at time of Discharge:   Basic Mobility Inpatient Raw Score: 23  JH-HLM Goal: 7: Walk 25 feet or more  JH-HLM Achieved: 7: Walk 25 feet or more       Disposition:   Other: [facility] ICU    Planned Readmission: Yes    Administrative Statements   Discharge Statement:  I have spent a total time of >40 minutes in caring for this patient on the day of the visit/encounter. >30 minutes of time was spent on: Diagnostic results, Risks and benefits of tx options, Instructions for management, Importance of tx compliance, Impressions, Documenting in the medical record, and Communicating with other healthcare professionals .    **Please Note: This note may have been  constructed using a voice recognition system**

## 2025-07-09 NOTE — NURSING NOTE
Discharge report given to Tomy. Verbalized understanding of instructions. [facility]

## 2025-07-09 NOTE — DISCHARGE INSTR - AVS FIRST PAGE
Follow-up:  - Follow-up with your PCP  - Follow-up with oncology   - follow-up with surgical oncology          Hospitalist taking care of patient at [place]:    - Dr. Margaret Arroyo

## 2025-07-09 NOTE — ASSESSMENT & PLAN NOTE
Lab Results   Component Value Date    EGFR 14 07/09/2025    EGFR 13 07/08/2025    EGFR 15 07/07/2025    CREATININE 4.06 (H) 07/09/2025    CREATININE 4.26 (H) 07/08/2025    CREATININE 3.98 (H) 07/07/2025   Baseline creatinine 1.4-1.8, presents with severe VIVIANA with creatinine of 2.97  Current creatinine worsened   Apology on board  Please see above under acute kidney injury

## 2025-07-10 ENCOUNTER — TELEPHONE (OUTPATIENT)
Age: 60
End: 2025-07-10

## 2025-07-10 ENCOUNTER — HOSPITAL ENCOUNTER (OUTPATIENT)
Dept: INFUSION CENTER | Facility: HOSPITAL | Age: 60
End: 2025-07-10
Attending: INTERNAL MEDICINE

## 2025-07-10 PROBLEM — Z51.5 PALLIATIVE CARE PATIENT: Status: ACTIVE | Noted: 2025-07-10

## 2025-07-10 PROBLEM — R43.2 DYSGEUSIA: Status: ACTIVE | Noted: 2025-07-10

## 2025-07-10 PROBLEM — E87.29 HIGH ANION GAP METABOLIC ACIDOSIS: Status: RESOLVED | Noted: 2025-07-09 | Resolved: 2025-07-10

## 2025-07-10 PROBLEM — R78.81 BACTEREMIA DUE TO KLEBSIELLA PNEUMONIAE: Status: ACTIVE | Noted: 2025-07-10

## 2025-07-10 PROBLEM — B96.1 BACTEREMIA DUE TO KLEBSIELLA PNEUMONIAE: Status: ACTIVE | Noted: 2025-07-10

## 2025-07-10 PROBLEM — R63.0 POOR APPETITE: Status: ACTIVE | Noted: 2025-07-10

## 2025-07-10 PROBLEM — E83.39 HYPOPHOSPHATEMIA: Status: RESOLVED | Noted: 2025-07-09 | Resolved: 2025-07-10

## 2025-07-10 LAB
ALBUMIN SERPL BCG-MCNC: 3 G/DL (ref 3.5–5)
ALP SERPL-CCNC: 59 U/L (ref 34–104)
ALT SERPL W P-5'-P-CCNC: 11 U/L (ref 7–52)
ANION GAP SERPL CALCULATED.3IONS-SCNC: 13 MMOL/L (ref 4–13)
APTT PPP: 25 SECONDS (ref 23–34)
APTT PPP: 69 SECONDS (ref 23–34)
AST SERPL W P-5'-P-CCNC: 13 U/L (ref 13–39)
BASOPHILS # BLD MANUAL: 0 THOUSAND/UL (ref 0–0.1)
BASOPHILS NFR MAR MANUAL: 0 % (ref 0–1)
BILIRUB SERPL-MCNC: 0.55 MG/DL (ref 0.2–1)
BLD SMEAR INTERP: NORMAL
BUN SERPL-MCNC: 95 MG/DL (ref 5–25)
CALCIUM ALBUM COR SERPL-MCNC: 7.9 MG/DL (ref 8.3–10.1)
CALCIUM SERPL-MCNC: 7.1 MG/DL (ref 8.4–10.2)
CHLORIDE SERPL-SCNC: 95 MMOL/L (ref 96–108)
CO2 SERPL-SCNC: 24 MMOL/L (ref 21–32)
CREAT SERPL-MCNC: 4.3 MG/DL (ref 0.6–1.3)
EOSINOPHIL # BLD MANUAL: 0 THOUSAND/UL (ref 0–0.4)
EOSINOPHIL NFR BLD MANUAL: 0 % (ref 0–6)
EOSINOPHIL NFR URNS MANUAL: 1 %
ERYTHROCYTE [DISTWIDTH] IN BLOOD BY AUTOMATED COUNT: 13 % (ref 11.6–15.1)
FERRITIN SERPL-MCNC: 272 NG/ML (ref 30–336)
FIBRINOGEN PPP-MCNC: 562 MG/DL (ref 206–523)
GFR SERPL CREATININE-BSD FRML MDRD: 13 ML/MIN/1.73SQ M
GLUCOSE SERPL-MCNC: 126 MG/DL (ref 65–140)
GLUCOSE SERPL-MCNC: 136 MG/DL (ref 65–140)
GLUCOSE SERPL-MCNC: 253 MG/DL (ref 65–140)
GLUCOSE SERPL-MCNC: 268 MG/DL (ref 65–140)
GLUCOSE SERPL-MCNC: 274 MG/DL (ref 65–140)
GLUCOSE SERPL-MCNC: 314 MG/DL (ref 65–140)
GLUCOSE SERPL-MCNC: 337 MG/DL (ref 65–140)
GLUCOSE SERPL-MCNC: 351 MG/DL (ref 65–140)
GLUCOSE SERPL-MCNC: 86 MG/DL (ref 65–140)
HCT VFR BLD AUTO: 27.3 % (ref 36.5–49.3)
HGB BLD-MCNC: 9 G/DL (ref 12–17)
INR PPP: 1.89 (ref 0.85–1.19)
INR PPP: 1.96 (ref 0.85–1.19)
IRON SATN MFR SERPL: 11 % (ref 15–50)
IRON SERPL-MCNC: 14 UG/DL (ref 50–212)
LDH SERPL-CCNC: 305 U/L (ref 140–271)
LYMPHOCYTES # BLD AUTO: 0.28 THOUSAND/UL (ref 0.6–4.47)
LYMPHOCYTES # BLD AUTO: 3 % (ref 14–44)
MAGNESIUM SERPL-MCNC: 2 MG/DL (ref 1.9–2.7)
MCH RBC QN AUTO: 29.4 PG (ref 26.8–34.3)
MCHC RBC AUTO-ENTMCNC: 33 G/DL (ref 31.4–37.4)
MCV RBC AUTO: 89 FL (ref 82–98)
METAMYELOCYTE ABSOLUTE CT: 0.09 THOUSAND/UL (ref 0–0.1)
METAMYELOCYTES NFR BLD MANUAL: 1 % (ref 0–1)
MONOCYTES # BLD AUTO: 0.28 THOUSAND/UL (ref 0–1.22)
MONOCYTES NFR BLD: 3 % (ref 4–12)
NEUTROPHILS # BLD MANUAL: 8.77 THOUSAND/UL (ref 1.85–7.62)
NEUTS BAND NFR BLD MANUAL: 8 % (ref 0–8)
NEUTS SEG NFR BLD AUTO: 85 % (ref 43–75)
PHOSPHATE SERPL-MCNC: 3.8 MG/DL (ref 2.3–4.1)
PLATELET # BLD AUTO: 58 THOUSANDS/UL (ref 149–390)
PLATELET BLD QL SMEAR: ABNORMAL
PMV BLD AUTO: 11.2 FL (ref 8.9–12.7)
POTASSIUM SERPL-SCNC: 3.6 MMOL/L (ref 3.5–5.3)
PROCALCITONIN SERPL-MCNC: 172.92 NG/ML
PROT SERPL-MCNC: 5.5 G/DL (ref 6.4–8.4)
PROTHROMBIN TIME: 21.8 SECONDS (ref 12.3–15)
PROTHROMBIN TIME: 22.4 SECONDS (ref 12.3–15)
RBC # BLD AUTO: 3.06 MILLION/UL (ref 3.88–5.62)
RBC MORPH BLD: NORMAL
SODIUM SERPL-SCNC: 132 MMOL/L (ref 135–147)
TIBC SERPL-MCNC: 131.6 UG/DL (ref 250–450)
TRANSFERRIN SERPL-MCNC: 94 MG/DL (ref 203–362)
UIBC SERPL-MCNC: 118 UG/DL (ref 155–355)
URATE SERPL-MCNC: 13.1 MG/DL (ref 3.5–8.5)
WBC # BLD AUTO: 9.43 THOUSAND/UL (ref 4.31–10.16)

## 2025-07-10 PROCEDURE — 84100 ASSAY OF PHOSPHORUS: CPT | Performed by: STUDENT IN AN ORGANIZED HEALTH CARE EDUCATION/TRAINING PROGRAM

## 2025-07-10 PROCEDURE — 84550 ASSAY OF BLOOD/URIC ACID: CPT

## 2025-07-10 PROCEDURE — NC001 PR NO CHARGE: Performed by: INTERNAL MEDICINE

## 2025-07-10 PROCEDURE — 85730 THROMBOPLASTIN TIME PARTIAL: CPT | Performed by: INTERNAL MEDICINE

## 2025-07-10 PROCEDURE — 99223 1ST HOSP IP/OBS HIGH 75: CPT | Performed by: STUDENT IN AN ORGANIZED HEALTH CARE EDUCATION/TRAINING PROGRAM

## 2025-07-10 PROCEDURE — NC001 PR NO CHARGE: Performed by: STUDENT IN AN ORGANIZED HEALTH CARE EDUCATION/TRAINING PROGRAM

## 2025-07-10 PROCEDURE — 83615 LACTATE (LD) (LDH) ENZYME: CPT | Performed by: STUDENT IN AN ORGANIZED HEALTH CARE EDUCATION/TRAINING PROGRAM

## 2025-07-10 PROCEDURE — 85730 THROMBOPLASTIN TIME PARTIAL: CPT | Performed by: STUDENT IN AN ORGANIZED HEALTH CARE EDUCATION/TRAINING PROGRAM

## 2025-07-10 PROCEDURE — 85384 FIBRINOGEN ACTIVITY: CPT | Performed by: STUDENT IN AN ORGANIZED HEALTH CARE EDUCATION/TRAINING PROGRAM

## 2025-07-10 PROCEDURE — 82948 REAGENT STRIP/BLOOD GLUCOSE: CPT

## 2025-07-10 PROCEDURE — 85007 BL SMEAR W/DIFF WBC COUNT: CPT | Performed by: STUDENT IN AN ORGANIZED HEALTH CARE EDUCATION/TRAINING PROGRAM

## 2025-07-10 PROCEDURE — 99223 1ST HOSP IP/OBS HIGH 75: CPT | Performed by: INTERNAL MEDICINE

## 2025-07-10 PROCEDURE — 84145 PROCALCITONIN (PCT): CPT

## 2025-07-10 PROCEDURE — 85610 PROTHROMBIN TIME: CPT | Performed by: STUDENT IN AN ORGANIZED HEALTH CARE EDUCATION/TRAINING PROGRAM

## 2025-07-10 PROCEDURE — G0545 PR INHERENT VISIT TO INPT: HCPCS | Performed by: INTERNAL MEDICINE

## 2025-07-10 PROCEDURE — 83550 IRON BINDING TEST: CPT | Performed by: STUDENT IN AN ORGANIZED HEALTH CARE EDUCATION/TRAINING PROGRAM

## 2025-07-10 PROCEDURE — 85027 COMPLETE CBC AUTOMATED: CPT | Performed by: STUDENT IN AN ORGANIZED HEALTH CARE EDUCATION/TRAINING PROGRAM

## 2025-07-10 PROCEDURE — 82728 ASSAY OF FERRITIN: CPT | Performed by: STUDENT IN AN ORGANIZED HEALTH CARE EDUCATION/TRAINING PROGRAM

## 2025-07-10 PROCEDURE — 99232 SBSQ HOSP IP/OBS MODERATE 35: CPT | Performed by: INTERNAL MEDICINE

## 2025-07-10 PROCEDURE — 83540 ASSAY OF IRON: CPT | Performed by: STUDENT IN AN ORGANIZED HEALTH CARE EDUCATION/TRAINING PROGRAM

## 2025-07-10 PROCEDURE — 83735 ASSAY OF MAGNESIUM: CPT | Performed by: STUDENT IN AN ORGANIZED HEALTH CARE EDUCATION/TRAINING PROGRAM

## 2025-07-10 PROCEDURE — 80053 COMPREHEN METABOLIC PANEL: CPT | Performed by: STUDENT IN AN ORGANIZED HEALTH CARE EDUCATION/TRAINING PROGRAM

## 2025-07-10 PROCEDURE — 99222 1ST HOSP IP/OBS MODERATE 55: CPT | Performed by: INTERNAL MEDICINE

## 2025-07-10 RX ORDER — HEPARIN SODIUM 10000 [USP'U]/100ML
3-30 INJECTION, SOLUTION INTRAVENOUS
Status: DISPENSED | OUTPATIENT
Start: 2025-07-10 | End: 2025-07-14

## 2025-07-10 RX ORDER — SODIUM CHLORIDE, SODIUM LACTATE, POTASSIUM CHLORIDE, CALCIUM CHLORIDE 600; 310; 30; 20 MG/100ML; MG/100ML; MG/100ML; MG/100ML
125 INJECTION, SOLUTION INTRAVENOUS CONTINUOUS
Status: CANCELLED | OUTPATIENT
Start: 2025-07-10

## 2025-07-10 RX ORDER — HEPARIN SODIUM 1000 [USP'U]/ML
10000 INJECTION, SOLUTION INTRAVENOUS; SUBCUTANEOUS EVERY 6 HOURS PRN
Status: ACTIVE | OUTPATIENT
Start: 2025-07-10 | End: 2025-07-14

## 2025-07-10 RX ORDER — HEPARIN SODIUM 1000 [USP'U]/ML
5000 INJECTION, SOLUTION INTRAVENOUS; SUBCUTANEOUS EVERY 6 HOURS PRN
Status: DISPENSED | OUTPATIENT
Start: 2025-07-10 | End: 2025-07-14

## 2025-07-10 RX ORDER — CALCIUM GLUCONATE 20 MG/ML
1 INJECTION, SOLUTION INTRAVENOUS ONCE
Status: COMPLETED | OUTPATIENT
Start: 2025-07-10 | End: 2025-07-10

## 2025-07-10 RX ADMIN — LIDOCAINE 1 PATCH: 700 PATCH TOPICAL at 09:10

## 2025-07-10 RX ADMIN — AMITRIPTYLINE HYDROCHLORIDE 30 MG: 10 TABLET, FILM COATED ORAL at 22:46

## 2025-07-10 RX ADMIN — SODIUM BICARBONATE 650 MG TABLET 650 MG: at 18:46

## 2025-07-10 RX ADMIN — HYDROMORPHONE HYDROCHLORIDE 0.2 MG: 0.2 INJECTION, SOLUTION INTRAMUSCULAR; INTRAVENOUS; SUBCUTANEOUS at 23:16

## 2025-07-10 RX ADMIN — PIPERACILLIN AND TAZOBACTAM 4.5 G: 36; 4.5 INJECTION, POWDER, FOR SOLUTION INTRAVENOUS at 03:08

## 2025-07-10 RX ADMIN — CEFEPIME 1000 MG: 1 INJECTION, POWDER, FOR SOLUTION INTRAMUSCULAR; INTRAVENOUS at 11:59

## 2025-07-10 RX ADMIN — METOCLOPRAMIDE 10 MG: 5 INJECTION, SOLUTION INTRAMUSCULAR; INTRAVENOUS at 23:13

## 2025-07-10 RX ADMIN — CHLORHEXIDINE GLUCONATE 15 ML: 1.2 SOLUTION ORAL at 09:10

## 2025-07-10 RX ADMIN — SODIUM BICARBONATE 650 MG TABLET 650 MG: at 09:10

## 2025-07-10 RX ADMIN — MIRTAZAPINE 7.5 MG: 15 TABLET, FILM COATED ORAL at 22:46

## 2025-07-10 RX ADMIN — CEFEPIME 1000 MG: 1 INJECTION, POWDER, FOR SOLUTION INTRAMUSCULAR; INTRAVENOUS at 22:46

## 2025-07-10 RX ADMIN — CALCIUM GLUCONATE 1 G: 20 INJECTION, SOLUTION INTRAVENOUS at 06:31

## 2025-07-10 RX ADMIN — HEPARIN SODIUM 18 UNITS/KG/HR: 10000 INJECTION, SOLUTION INTRAVENOUS at 14:39

## 2025-07-10 NOTE — TELEPHONE ENCOUNTER
Caller: Patient    Doctor: Dr. Mccarthy    Reason for call: Patient wanted to make Dr. Mccarthy aware that he was admitted to the hospital.    Call back#: [phone]

## 2025-07-11 ENCOUNTER — ANESTHESIA EVENT (INPATIENT)
Dept: GASTROENTEROLOGY | Facility: HOSPITAL | Age: 60
End: 2025-07-11
Payer: MEDICARE

## 2025-07-11 ENCOUNTER — APPOINTMENT (INPATIENT)
Dept: GASTROENTEROLOGY | Facility: HOSPITAL | Age: 60
DRG: 682 | End: 2025-07-11
Attending: STUDENT IN AN ORGANIZED HEALTH CARE EDUCATION/TRAINING PROGRAM
Payer: MEDICARE

## 2025-07-11 ENCOUNTER — ANESTHESIA (INPATIENT)
Dept: GASTROENTEROLOGY | Facility: HOSPITAL | Age: 60
End: 2025-07-11
Payer: MEDICARE

## 2025-07-11 PROBLEM — A41.9 SEPTIC SHOCK (HCC): Status: ACTIVE | Noted: 2025-07-11

## 2025-07-11 PROBLEM — R65.21 SEPTIC SHOCK (HCC): Status: ACTIVE | Noted: 2025-07-11

## 2025-07-11 LAB
ALBUMIN SERPL BCG-MCNC: 2.9 G/DL (ref 3.5–5)
ALP SERPL-CCNC: 63 U/L (ref 34–104)
ALT SERPL W P-5'-P-CCNC: 10 U/L (ref 7–52)
ANION GAP SERPL CALCULATED.3IONS-SCNC: 11 MMOL/L (ref 4–13)
APTT PPP: 59 SECONDS (ref 23–34)
APTT PPP: 65 SECONDS (ref 23–34)
AST SERPL W P-5'-P-CCNC: 10 U/L (ref 13–39)
BACTERIA UR CULT: NORMAL
BILIRUB SERPL-MCNC: 0.43 MG/DL (ref 0.2–1)
BUN SERPL-MCNC: 100 MG/DL (ref 5–25)
CALCIUM ALBUM COR SERPL-MCNC: 7.8 MG/DL (ref 8.3–10.1)
CALCIUM SERPL-MCNC: 6.9 MG/DL (ref 8.4–10.2)
CHLORIDE SERPL-SCNC: 97 MMOL/L (ref 96–108)
CO2 SERPL-SCNC: 23 MMOL/L (ref 21–32)
CREAT SERPL-MCNC: 3.85 MG/DL (ref 0.6–1.3)
ERYTHROCYTE [DISTWIDTH] IN BLOOD BY AUTOMATED COUNT: 13.2 % (ref 11.6–15.1)
GFR SERPL CREATININE-BSD FRML MDRD: 15 ML/MIN/1.73SQ M
GLUCOSE SERPL-MCNC: 111 MG/DL (ref 65–140)
GLUCOSE SERPL-MCNC: 114 MG/DL (ref 65–140)
GLUCOSE SERPL-MCNC: 126 MG/DL (ref 65–140)
GLUCOSE SERPL-MCNC: 128 MG/DL (ref 65–140)
GLUCOSE SERPL-MCNC: 132 MG/DL (ref 65–140)
GLUCOSE SERPL-MCNC: 137 MG/DL (ref 65–140)
GLUCOSE SERPL-MCNC: 138 MG/DL (ref 65–140)
GLUCOSE SERPL-MCNC: 140 MG/DL (ref 65–140)
GLUCOSE SERPL-MCNC: 142 MG/DL (ref 65–140)
GLUCOSE SERPL-MCNC: 147 MG/DL (ref 65–140)
GLUCOSE SERPL-MCNC: 157 MG/DL (ref 65–140)
GLUCOSE SERPL-MCNC: 177 MG/DL (ref 65–140)
GLUCOSE SERPL-MCNC: 185 MG/DL (ref 65–140)
GLUCOSE SERPL-MCNC: 195 MG/DL (ref 65–140)
GLUCOSE SERPL-MCNC: 225 MG/DL (ref 65–140)
HCT VFR BLD AUTO: 29.3 % (ref 36.5–49.3)
HGB BLD-MCNC: 9.8 G/DL (ref 12–17)
MCH RBC QN AUTO: 29.5 PG (ref 26.8–34.3)
MCHC RBC AUTO-ENTMCNC: 33.4 G/DL (ref 31.4–37.4)
MCV RBC AUTO: 88 FL (ref 82–98)
NRBC BLD AUTO-RTO: 0 /100 WBCS
PHOSPHATE SERPL-MCNC: 3.3 MG/DL (ref 2.3–4.1)
PLATELET # BLD AUTO: 58 THOUSANDS/UL (ref 149–390)
PMV BLD AUTO: 12.1 FL (ref 8.9–12.7)
POTASSIUM SERPL-SCNC: 3.4 MMOL/L (ref 3.5–5.3)
PROT SERPL-MCNC: 5.3 G/DL (ref 6.4–8.4)
RBC # BLD AUTO: 3.32 MILLION/UL (ref 3.88–5.62)
SODIUM SERPL-SCNC: 131 MMOL/L (ref 135–147)
URATE SERPL-MCNC: 12.5 MG/DL (ref 3.5–8.5)
WBC # BLD AUTO: 12.48 THOUSAND/UL (ref 4.31–10.16)

## 2025-07-11 PROCEDURE — 85027 COMPLETE CBC AUTOMATED: CPT

## 2025-07-11 PROCEDURE — 84550 ASSAY OF BLOOD/URIC ACID: CPT | Performed by: NURSE PRACTITIONER

## 2025-07-11 PROCEDURE — 82948 REAGENT STRIP/BLOOD GLUCOSE: CPT

## 2025-07-11 PROCEDURE — 85730 THROMBOPLASTIN TIME PARTIAL: CPT | Performed by: STUDENT IN AN ORGANIZED HEALTH CARE EDUCATION/TRAINING PROGRAM

## 2025-07-11 PROCEDURE — 99232 SBSQ HOSP IP/OBS MODERATE 35: CPT | Performed by: STUDENT IN AN ORGANIZED HEALTH CARE EDUCATION/TRAINING PROGRAM

## 2025-07-11 PROCEDURE — G0545 PR INHERENT VISIT TO INPT: HCPCS | Performed by: INTERNAL MEDICINE

## 2025-07-11 PROCEDURE — 97167 OT EVAL HIGH COMPLEX 60 MIN: CPT

## 2025-07-11 PROCEDURE — 43235 EGD DIAGNOSTIC BRUSH WASH: CPT | Performed by: INTERNAL MEDICINE

## 2025-07-11 PROCEDURE — 85730 THROMBOPLASTIN TIME PARTIAL: CPT | Performed by: NURSE PRACTITIONER

## 2025-07-11 PROCEDURE — 99232 SBSQ HOSP IP/OBS MODERATE 35: CPT | Performed by: INTERNAL MEDICINE

## 2025-07-11 PROCEDURE — 87040 BLOOD CULTURE FOR BACTERIA: CPT | Performed by: PHYSICIAN ASSISTANT

## 2025-07-11 PROCEDURE — 87186 SC STD MICRODIL/AGAR DIL: CPT | Performed by: PHYSICIAN ASSISTANT

## 2025-07-11 PROCEDURE — 87154 CUL TYP ID BLD PTHGN 6+ TRGT: CPT | Performed by: PHYSICIAN ASSISTANT

## 2025-07-11 PROCEDURE — NC001 PR NO CHARGE: Performed by: STUDENT IN AN ORGANIZED HEALTH CARE EDUCATION/TRAINING PROGRAM

## 2025-07-11 PROCEDURE — 97163 PT EVAL HIGH COMPLEX 45 MIN: CPT

## 2025-07-11 PROCEDURE — 80053 COMPREHEN METABOLIC PANEL: CPT | Performed by: NURSE PRACTITIONER

## 2025-07-11 PROCEDURE — 87077 CULTURE AEROBIC IDENTIFY: CPT | Performed by: PHYSICIAN ASSISTANT

## 2025-07-11 PROCEDURE — 0DJ08ZZ INSPECTION OF UPPER INTESTINAL TRACT, VIA NATURAL OR ARTIFICIAL OPENING ENDOSCOPIC: ICD-10-PCS | Performed by: INTERNAL MEDICINE

## 2025-07-11 PROCEDURE — 84100 ASSAY OF PHOSPHORUS: CPT | Performed by: NURSE PRACTITIONER

## 2025-07-11 RX ORDER — ALLOPURINOL 100 MG/1
50 TABLET ORAL 2 TIMES WEEKLY
Status: DISCONTINUED | OUTPATIENT
Start: 2025-07-11 | End: 2025-08-01 | Stop reason: HOSPADM

## 2025-07-11 RX ORDER — SODIUM CHLORIDE, SODIUM LACTATE, POTASSIUM CHLORIDE, CALCIUM CHLORIDE 600; 310; 30; 20 MG/100ML; MG/100ML; MG/100ML; MG/100ML
INJECTION, SOLUTION INTRAVENOUS CONTINUOUS PRN
Status: DISCONTINUED | OUTPATIENT
Start: 2025-07-11 | End: 2025-07-11

## 2025-07-11 RX ORDER — INSULIN LISPRO 100 [IU]/ML
1-5 INJECTION, SOLUTION INTRAVENOUS; SUBCUTANEOUS
Status: DISCONTINUED | OUTPATIENT
Start: 2025-07-12 | End: 2025-07-12

## 2025-07-11 RX ORDER — POTASSIUM CHLORIDE 1500 MG/1
20 TABLET, EXTENDED RELEASE ORAL ONCE
Status: COMPLETED | OUTPATIENT
Start: 2025-07-11 | End: 2025-07-11

## 2025-07-11 RX ORDER — INSULIN LISPRO 100 [IU]/ML
1-5 INJECTION, SOLUTION INTRAVENOUS; SUBCUTANEOUS
Status: DISCONTINUED | OUTPATIENT
Start: 2025-07-11 | End: 2025-07-18

## 2025-07-11 RX ORDER — INSULIN GLARGINE 100 [IU]/ML
20 INJECTION, SOLUTION SUBCUTANEOUS
Status: DISCONTINUED | OUTPATIENT
Start: 2025-07-11 | End: 2025-07-12

## 2025-07-11 RX ORDER — ACETAMINOPHEN 325 MG/1
975 TABLET ORAL EVERY 8 HOURS SCHEDULED
Status: DISCONTINUED | OUTPATIENT
Start: 2025-07-11 | End: 2025-08-01 | Stop reason: HOSPADM

## 2025-07-11 RX ORDER — PROPOFOL 10 MG/ML
INJECTION, EMULSION INTRAVENOUS AS NEEDED
Status: DISCONTINUED | OUTPATIENT
Start: 2025-07-11 | End: 2025-07-11

## 2025-07-11 RX ORDER — LIDOCAINE HYDROCHLORIDE 20 MG/ML
INJECTION, SOLUTION EPIDURAL; INFILTRATION; INTRACAUDAL; PERINEURAL AS NEEDED
Status: DISCONTINUED | OUTPATIENT
Start: 2025-07-11 | End: 2025-07-11

## 2025-07-11 RX ADMIN — ALLOPURINOL 50 MG: 100 TABLET ORAL at 09:58

## 2025-07-11 RX ADMIN — TRAMADOL HYDROCHLORIDE 100 MG: 50 TABLET, COATED ORAL at 22:26

## 2025-07-11 RX ADMIN — HYDROMORPHONE HYDROCHLORIDE 0.2 MG: 0.2 INJECTION, SOLUTION INTRAMUSCULAR; INTRAVENOUS; SUBCUTANEOUS at 07:53

## 2025-07-11 RX ADMIN — LIDOCAINE HYDROCHLORIDE 100 MG: 20 INJECTION, SOLUTION EPIDURAL; INFILTRATION; INTRACAUDAL; PERINEURAL at 14:44

## 2025-07-11 RX ADMIN — AMITRIPTYLINE HYDROCHLORIDE 30 MG: 10 TABLET, FILM COATED ORAL at 22:26

## 2025-07-11 RX ADMIN — SODIUM BICARBONATE 650 MG TABLET 650 MG: at 17:14

## 2025-07-11 RX ADMIN — PROPOFOL 100 MG: 10 INJECTION, EMULSION INTRAVENOUS at 14:44

## 2025-07-11 RX ADMIN — CEFEPIME 1000 MG: 1 INJECTION, POWDER, FOR SOLUTION INTRAMUSCULAR; INTRAVENOUS at 22:30

## 2025-07-11 RX ADMIN — POTASSIUM CHLORIDE 20 MEQ: 1500 TABLET, EXTENDED RELEASE ORAL at 12:38

## 2025-07-11 RX ADMIN — PROPOFOL 50 MG: 10 INJECTION, EMULSION INTRAVENOUS at 14:53

## 2025-07-11 RX ADMIN — MIRTAZAPINE 7.5 MG: 15 TABLET, FILM COATED ORAL at 22:26

## 2025-07-11 RX ADMIN — SODIUM CHLORIDE 4 UNITS/HR: 9 INJECTION, SOLUTION INTRAVENOUS at 06:03

## 2025-07-11 RX ADMIN — CEFEPIME 1000 MG: 1 INJECTION, POWDER, FOR SOLUTION INTRAMUSCULAR; INTRAVENOUS at 10:47

## 2025-07-11 RX ADMIN — PANTOPRAZOLE SODIUM 40 MG: 40 TABLET, DELAYED RELEASE ORAL at 17:14

## 2025-07-11 RX ADMIN — SODIUM CHLORIDE, SODIUM LACTATE, POTASSIUM CHLORIDE, AND CALCIUM CHLORIDE: .6; .31; .03; .02 INJECTION, SOLUTION INTRAVENOUS at 14:15

## 2025-07-11 RX ADMIN — HEPARIN SODIUM 18 UNITS/KG/HR: 10000 INJECTION, SOLUTION INTRAVENOUS at 02:08

## 2025-07-11 RX ADMIN — ACETAMINOPHEN 975 MG: 325 TABLET ORAL at 22:26

## 2025-07-11 RX ADMIN — ACETAMINOPHEN 975 MG: 325 TABLET ORAL at 09:58

## 2025-07-11 RX ADMIN — METHOCARBAMOL 750 MG: 750 TABLET ORAL at 22:26

## 2025-07-11 RX ADMIN — INSULIN GLARGINE 20 UNITS: 100 INJECTION, SOLUTION SUBCUTANEOUS at 22:26

## 2025-07-11 NOTE — ANESTHESIA PREPROCEDURE EVALUATION
Procedure:  EGD    Relevant Problems   CARDIO   (+) Deep vein thrombosis (DVT) of femoral vein of left lower extremity (HCC)   (+) Essential hypertension      ENDO   (+) Type 2 diabetes mellitus with chronic kidney disease, without long-term current use of insulin (HCC)   (+) Type 2 diabetes mellitus with hyperglycemia, without long-term current use of insulin (HCC)      GI/HEPATIC   (+) Malignant neoplasm of lower third of esophagus (HCC)   (+) Other dysphagia      /RENAL   (+) VIVIANA (acute kidney injury) (HCC)   (+) Acute kidney injury superimposed on stage 3a chronic kidney disease (HCC)   (+) Nephrolithiasis   (+) Stage 3b chronic kidney disease (HCC)      HEMATOLOGY   (+) Anemia   (+) Pancytopenia (HCC)      MUSCULOSKELETAL   (+) 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         Additional Monitors:     Airway Plan: natural airway.           Plan Factors-    Chart reviewed.    Patient summary reviewed.                  Induction- intravenous.    Postoperative Plan- .   Monitoring Plan - Monitoring plan - standard ASA monitoring      Perioperative Resuscitation Plan - Level 1 - Full Code.       Informed Consent- Anesthetic plan and risks discussed with patient.  I personally reviewed this patient with the CRNA. Discussed and agreed on the Anesthesia Plan with the CRNA..      NPO Status:  Vitals Value Taken Time   Date of last liquid 07/11/25 07/11/25 14:17   Time of last liquid 1000 07/11/25 14:17   Date of last solid 07/10/25 07/11/25 14:17   Time of last solid 1500 07/11/25 14:17

## 2025-07-11 NOTE — ANESTHESIA POSTPROCEDURE EVALUATION
Post-Op Assessment Note    CV Status:  Stable  Pain Score: 0    Pain management: adequate       Mental Status:  Alert and awake   Hydration Status:  Euvolemic   PONV Controlled:  Controlled   Airway Patency:  Patent  Two or more mitigation strategies used for obstructive sleep apnea. There is a medical reason for not screening for obstructive sleep apnea and/or for not using two or more mitigation strategies   Post Op Vitals Reviewed: Yes    No anethesia notable event occurred.            Last Filed PACU Vitals:  Vitals Value Taken Time   Temp 97.9 °F (36.6 °C) 07/11/25 14:59   Pulse 87 07/11/25 14:59   /71 07/11/25 14:59   Resp 18 07/11/25 14:59   SpO2 94 % 07/11/25 14:59       Modified Rudy:     Vitals Value Taken Time   Activity 2 07/11/25 15:00   Respiration 2 07/11/25 15:00   Circulation 2 07/11/25 15:00   Consciousness 0 07/11/25 15:00   Oxygen Saturation 1 07/11/25 15:00     Modified Rudy Score: 7

## 2025-07-11 NOTE — ANESTHESIA POSTPROCEDURE EVALUATION
Post-Op Assessment Note            No anethesia notable event occurred.            Last Filed PACU Vitals:  Vitals Value Taken Time   Temp 97.9 °F (36.6 °C) 07/11/25 14:59   Pulse 84 07/11/25 15:15   /80 07/11/25 15:15   Resp 18 07/11/25 15:15   SpO2 96 % 07/11/25 15:15       Modified Rudy:     Vitals Value Taken Time   Activity 2 07/11/25 15:15   Respiration 2 07/11/25 15:15   Circulation 2 07/11/25 15:15   Consciousness 2 07/11/25 15:15   Oxygen Saturation 2 07/11/25 15:15     Modified Rudy Score: 10

## 2025-07-12 LAB
ALBUMIN SERPL BCG-MCNC: 2.8 G/DL (ref 3.5–5)
ALP SERPL-CCNC: 58 U/L (ref 34–104)
ALT SERPL W P-5'-P-CCNC: 9 U/L (ref 7–52)
ANION GAP SERPL CALCULATED.3IONS-SCNC: 11 MMOL/L (ref 4–13)
APTT PPP: 110 SECONDS (ref 23–34)
APTT PPP: 86 SECONDS (ref 23–34)
APTT PPP: 97 SECONDS (ref 23–34)
AST SERPL W P-5'-P-CCNC: 9 U/L (ref 13–39)
BACTERIA BLD CULT: ABNORMAL
BACTERIA BLD CULT: ABNORMAL
BILIRUB SERPL-MCNC: 0.42 MG/DL (ref 0.2–1)
BUN SERPL-MCNC: 89 MG/DL (ref 5–25)
CALCIUM ALBUM COR SERPL-MCNC: 7.9 MG/DL (ref 8.3–10.1)
CALCIUM SERPL-MCNC: 6.9 MG/DL (ref 8.4–10.2)
CHLORIDE SERPL-SCNC: 99 MMOL/L (ref 96–108)
CO2 SERPL-SCNC: 21 MMOL/L (ref 21–32)
CREAT SERPL-MCNC: 2.86 MG/DL (ref 0.6–1.3)
EST. AVERAGE GLUCOSE BLD GHB EST-MCNC: 280 MG/DL
GFR SERPL CREATININE-BSD FRML MDRD: 22 ML/MIN/1.73SQ M
GLUCOSE SERPL-MCNC: 211 MG/DL (ref 65–140)
GLUCOSE SERPL-MCNC: 282 MG/DL (ref 65–140)
GLUCOSE SERPL-MCNC: 308 MG/DL (ref 65–140)
GLUCOSE SERPL-MCNC: 316 MG/DL (ref 65–140)
GLUCOSE SERPL-MCNC: 331 MG/DL (ref 65–140)
GLUCOSE SERPL-MCNC: 356 MG/DL (ref 65–140)
GRAM STN SPEC: ABNORMAL
GRAM STN SPEC: ABNORMAL
HBA1C MFR BLD: 11.4 %
KLEBSIELLA SP DNA BLD POS QL NAA+NON-PRB: DETECTED
MAGNESIUM SERPL-MCNC: 1.9 MG/DL (ref 1.9–2.7)
PHOSPHATE SERPL-MCNC: 2.6 MG/DL (ref 2.3–4.1)
POTASSIUM SERPL-SCNC: 3.4 MMOL/L (ref 3.5–5.3)
PROT SERPL-MCNC: 5.1 G/DL (ref 6.4–8.4)
SODIUM SERPL-SCNC: 131 MMOL/L (ref 135–147)
URATE SERPL-MCNC: 11.7 MG/DL (ref 3.5–8.5)

## 2025-07-12 PROCEDURE — 87205 SMEAR GRAM STAIN: CPT | Performed by: INTERNAL MEDICINE

## 2025-07-12 PROCEDURE — 84550 ASSAY OF BLOOD/URIC ACID: CPT | Performed by: STUDENT IN AN ORGANIZED HEALTH CARE EDUCATION/TRAINING PROGRAM

## 2025-07-12 PROCEDURE — 99232 SBSQ HOSP IP/OBS MODERATE 35: CPT | Performed by: INTERNAL MEDICINE

## 2025-07-12 PROCEDURE — 85730 THROMBOPLASTIN TIME PARTIAL: CPT | Performed by: STUDENT IN AN ORGANIZED HEALTH CARE EDUCATION/TRAINING PROGRAM

## 2025-07-12 PROCEDURE — 82948 REAGENT STRIP/BLOOD GLUCOSE: CPT

## 2025-07-12 PROCEDURE — G0545 PR INHERENT VISIT TO INPT: HCPCS | Performed by: INTERNAL MEDICINE

## 2025-07-12 PROCEDURE — 83036 HEMOGLOBIN GLYCOSYLATED A1C: CPT | Performed by: STUDENT IN AN ORGANIZED HEALTH CARE EDUCATION/TRAINING PROGRAM

## 2025-07-12 PROCEDURE — 99232 SBSQ HOSP IP/OBS MODERATE 35: CPT | Performed by: STUDENT IN AN ORGANIZED HEALTH CARE EDUCATION/TRAINING PROGRAM

## 2025-07-12 PROCEDURE — 84100 ASSAY OF PHOSPHORUS: CPT | Performed by: STUDENT IN AN ORGANIZED HEALTH CARE EDUCATION/TRAINING PROGRAM

## 2025-07-12 PROCEDURE — 83735 ASSAY OF MAGNESIUM: CPT | Performed by: INTERNAL MEDICINE

## 2025-07-12 PROCEDURE — 80053 COMPREHEN METABOLIC PANEL: CPT | Performed by: STUDENT IN AN ORGANIZED HEALTH CARE EDUCATION/TRAINING PROGRAM

## 2025-07-12 RX ORDER — INSULIN LISPRO 100 [IU]/ML
2-12 INJECTION, SOLUTION INTRAVENOUS; SUBCUTANEOUS
Status: DISCONTINUED | OUTPATIENT
Start: 2025-07-12 | End: 2025-08-01 | Stop reason: HOSPADM

## 2025-07-12 RX ORDER — INSULIN LISPRO 100 [IU]/ML
4 INJECTION, SOLUTION INTRAVENOUS; SUBCUTANEOUS
Status: DISCONTINUED | OUTPATIENT
Start: 2025-07-12 | End: 2025-07-12

## 2025-07-12 RX ORDER — POTASSIUM CHLORIDE 1500 MG/1
40 TABLET, EXTENDED RELEASE ORAL ONCE
Status: COMPLETED | OUTPATIENT
Start: 2025-07-12 | End: 2025-07-12

## 2025-07-12 RX ORDER — INSULIN LISPRO 100 [IU]/ML
6 INJECTION, SOLUTION INTRAVENOUS; SUBCUTANEOUS
Status: DISCONTINUED | OUTPATIENT
Start: 2025-07-12 | End: 2025-07-31

## 2025-07-12 RX ORDER — CEFAZOLIN SODIUM 1 G/50ML
1000 SOLUTION INTRAVENOUS EVERY 12 HOURS
Status: DISCONTINUED | OUTPATIENT
Start: 2025-07-12 | End: 2025-07-13

## 2025-07-12 RX ORDER — INSULIN GLARGINE 100 [IU]/ML
20 INJECTION, SOLUTION SUBCUTANEOUS EVERY 12 HOURS SCHEDULED
Status: DISCONTINUED | OUTPATIENT
Start: 2025-07-12 | End: 2025-07-28

## 2025-07-12 RX ORDER — INSULIN LISPRO 100 [IU]/ML
1-5 INJECTION, SOLUTION INTRAVENOUS; SUBCUTANEOUS
Status: DISCONTINUED | OUTPATIENT
Start: 2025-07-12 | End: 2025-08-01 | Stop reason: HOSPADM

## 2025-07-12 RX ADMIN — PANTOPRAZOLE SODIUM 40 MG: 40 TABLET, DELAYED RELEASE ORAL at 16:37

## 2025-07-12 RX ADMIN — HEPARIN SODIUM 20 UNITS/KG/HR: 10000 INJECTION, SOLUTION INTRAVENOUS at 01:10

## 2025-07-12 RX ADMIN — INSULIN GLARGINE 20 UNITS: 100 INJECTION, SOLUTION SUBCUTANEOUS at 09:38

## 2025-07-12 RX ADMIN — HEPARIN SODIUM 5000 UNITS: 1000 INJECTION INTRAVENOUS; SUBCUTANEOUS at 00:24

## 2025-07-12 RX ADMIN — MIRTAZAPINE 7.5 MG: 15 TABLET, FILM COATED ORAL at 21:20

## 2025-07-12 RX ADMIN — INSULIN LISPRO 6 UNITS: 100 INJECTION, SOLUTION INTRAVENOUS; SUBCUTANEOUS at 12:57

## 2025-07-12 RX ADMIN — ACETAMINOPHEN 975 MG: 325 TABLET ORAL at 21:20

## 2025-07-12 RX ADMIN — ACETAMINOPHEN 975 MG: 325 TABLET ORAL at 06:10

## 2025-07-12 RX ADMIN — ACETAMINOPHEN 975 MG: 325 TABLET ORAL at 13:01

## 2025-07-12 RX ADMIN — SODIUM BICARBONATE 650 MG TABLET 650 MG: at 09:38

## 2025-07-12 RX ADMIN — INSULIN LISPRO 6 UNITS: 100 INJECTION, SOLUTION INTRAVENOUS; SUBCUTANEOUS at 16:37

## 2025-07-12 RX ADMIN — PANTOPRAZOLE SODIUM 40 MG: 40 TABLET, DELAYED RELEASE ORAL at 06:10

## 2025-07-12 RX ADMIN — TRAMADOL HYDROCHLORIDE 100 MG: 50 TABLET, COATED ORAL at 21:20

## 2025-07-12 RX ADMIN — INSULIN LISPRO 3 UNITS: 100 INJECTION, SOLUTION INTRAVENOUS; SUBCUTANEOUS at 12:01

## 2025-07-12 RX ADMIN — INSULIN LISPRO 2 UNITS: 100 INJECTION, SOLUTION INTRAVENOUS; SUBCUTANEOUS at 09:40

## 2025-07-12 RX ADMIN — CEFAZOLIN SODIUM 1000 MG: 1 SOLUTION INTRAVENOUS at 09:39

## 2025-07-12 RX ADMIN — SODIUM BICARBONATE 650 MG TABLET 650 MG: at 16:37

## 2025-07-12 RX ADMIN — INSULIN LISPRO 1 UNITS: 100 INJECTION, SOLUTION INTRAVENOUS; SUBCUTANEOUS at 21:26

## 2025-07-12 RX ADMIN — CEFAZOLIN SODIUM 1000 MG: 1 SOLUTION INTRAVENOUS at 21:20

## 2025-07-12 RX ADMIN — HYDROMORPHONE HYDROCHLORIDE 0.2 MG: 0.2 INJECTION, SOLUTION INTRAMUSCULAR; INTRAVENOUS; SUBCUTANEOUS at 15:53

## 2025-07-12 RX ADMIN — INSULIN GLARGINE 20 UNITS: 100 INJECTION, SOLUTION SUBCUTANEOUS at 21:20

## 2025-07-12 RX ADMIN — POTASSIUM CHLORIDE 40 MEQ: 1500 TABLET, EXTENDED RELEASE ORAL at 09:40

## 2025-07-12 RX ADMIN — HEPARIN SODIUM 20 UNITS/KG/HR: 10000 INJECTION, SOLUTION INTRAVENOUS at 09:38

## 2025-07-12 RX ADMIN — HEPARIN SODIUM 18 UNITS/KG/HR: 10000 INJECTION, SOLUTION INTRAVENOUS at 19:57

## 2025-07-12 RX ADMIN — AMITRIPTYLINE HYDROCHLORIDE 30 MG: 10 TABLET, FILM COATED ORAL at 21:20

## 2025-07-12 RX ADMIN — INSULIN LISPRO 8 UNITS: 100 INJECTION, SOLUTION INTRAVENOUS; SUBCUTANEOUS at 16:38

## 2025-07-13 PROBLEM — E87.20 ACIDOSIS: Status: ACTIVE | Noted: 2025-07-13

## 2025-07-13 LAB
ANION GAP SERPL CALCULATED.3IONS-SCNC: 13 MMOL/L (ref 4–13)
ANISOCYTOSIS BLD QL SMEAR: PRESENT
APTT PPP: 45 SECONDS (ref 23–34)
APTT PPP: 67 SECONDS (ref 23–34)
APTT PPP: 76 SECONDS (ref 23–34)
BASOPHILS # BLD MANUAL: 0 THOUSAND/UL (ref 0–0.1)
BASOPHILS NFR MAR MANUAL: 0 % (ref 0–1)
BUN SERPL-MCNC: 65 MG/DL (ref 5–25)
CALCIUM SERPL-MCNC: 7.7 MG/DL (ref 8.4–10.2)
CHLORIDE SERPL-SCNC: 99 MMOL/L (ref 96–108)
CO2 SERPL-SCNC: 24 MMOL/L (ref 21–32)
CREAT SERPL-MCNC: 2.1 MG/DL (ref 0.6–1.3)
EOSINOPHIL # BLD MANUAL: 0 THOUSAND/UL (ref 0–0.4)
EOSINOPHIL NFR BLD MANUAL: 0 % (ref 0–6)
EOSINOPHIL NFR URNS MANUAL: 0 %
ERYTHROCYTE [DISTWIDTH] IN BLOOD BY AUTOMATED COUNT: 13.2 % (ref 11.6–15.1)
GFR SERPL CREATININE-BSD FRML MDRD: 32 ML/MIN/1.73SQ M
GLUCOSE SERPL-MCNC: 143 MG/DL (ref 65–140)
GLUCOSE SERPL-MCNC: 179 MG/DL (ref 65–140)
GLUCOSE SERPL-MCNC: 184 MG/DL (ref 65–140)
GLUCOSE SERPL-MCNC: 189 MG/DL (ref 65–140)
GLUCOSE SERPL-MCNC: 207 MG/DL (ref 65–140)
HCT VFR BLD AUTO: 34.8 % (ref 36.5–49.3)
HGB BLD-MCNC: 11.3 G/DL (ref 12–17)
LYMPHOCYTES # BLD AUTO: 0.73 THOUSAND/UL (ref 0.6–4.47)
LYMPHOCYTES # BLD AUTO: 7 % (ref 14–44)
MACROCYTES BLD QL AUTO: PRESENT
MCH RBC QN AUTO: 28.7 PG (ref 26.8–34.3)
MCHC RBC AUTO-ENTMCNC: 32.5 G/DL (ref 31.4–37.4)
MCV RBC AUTO: 88 FL (ref 82–98)
METAMYELOCYTE ABSOLUTE CT: 0.1 THOUSAND/UL (ref 0–0.1)
METAMYELOCYTES NFR BLD MANUAL: 1 % (ref 0–1)
MONOCYTES # BLD AUTO: 0.63 THOUSAND/UL (ref 0–1.22)
MONOCYTES NFR BLD: 6 % (ref 4–12)
NEUTROPHILS # BLD MANUAL: 9.02 THOUSAND/UL (ref 1.85–7.62)
NEUTS SEG NFR BLD AUTO: 86 % (ref 43–75)
PLATELET # BLD AUTO: 85 THOUSANDS/UL (ref 149–390)
PLATELET BLD QL SMEAR: ABNORMAL
PMV BLD AUTO: 11.8 FL (ref 8.9–12.7)
POLYCHROMASIA BLD QL SMEAR: PRESENT
POTASSIUM SERPL-SCNC: 3.6 MMOL/L (ref 3.5–5.3)
RBC # BLD AUTO: 3.94 MILLION/UL (ref 3.88–5.62)
RBC MORPH BLD: PRESENT
SODIUM SERPL-SCNC: 136 MMOL/L (ref 135–147)
WBC # BLD AUTO: 10.49 THOUSAND/UL (ref 4.31–10.16)

## 2025-07-13 PROCEDURE — G0545 PR INHERENT VISIT TO INPT: HCPCS | Performed by: INTERNAL MEDICINE

## 2025-07-13 PROCEDURE — 82948 REAGENT STRIP/BLOOD GLUCOSE: CPT

## 2025-07-13 PROCEDURE — 99232 SBSQ HOSP IP/OBS MODERATE 35: CPT | Performed by: INTERNAL MEDICINE

## 2025-07-13 PROCEDURE — 85730 THROMBOPLASTIN TIME PARTIAL: CPT | Performed by: STUDENT IN AN ORGANIZED HEALTH CARE EDUCATION/TRAINING PROGRAM

## 2025-07-13 PROCEDURE — 85007 BL SMEAR W/DIFF WBC COUNT: CPT | Performed by: INTERNAL MEDICINE

## 2025-07-13 PROCEDURE — 99232 SBSQ HOSP IP/OBS MODERATE 35: CPT | Performed by: STUDENT IN AN ORGANIZED HEALTH CARE EDUCATION/TRAINING PROGRAM

## 2025-07-13 PROCEDURE — 85027 COMPLETE CBC AUTOMATED: CPT | Performed by: INTERNAL MEDICINE

## 2025-07-13 PROCEDURE — 80048 BASIC METABOLIC PNL TOTAL CA: CPT | Performed by: INTERNAL MEDICINE

## 2025-07-13 RX ORDER — CEFAZOLIN SODIUM 1 G/50ML
1000 SOLUTION INTRAVENOUS EVERY 8 HOURS
Status: DISCONTINUED | OUTPATIENT
Start: 2025-07-13 | End: 2025-07-24

## 2025-07-13 RX ADMIN — INSULIN LISPRO 1 UNITS: 100 INJECTION, SOLUTION INTRAVENOUS; SUBCUTANEOUS at 22:26

## 2025-07-13 RX ADMIN — INSULIN LISPRO 6 UNITS: 100 INJECTION, SOLUTION INTRAVENOUS; SUBCUTANEOUS at 12:37

## 2025-07-13 RX ADMIN — PANTOPRAZOLE SODIUM 40 MG: 40 TABLET, DELAYED RELEASE ORAL at 17:10

## 2025-07-13 RX ADMIN — CEFAZOLIN SODIUM 1000 MG: 1 SOLUTION INTRAVENOUS at 09:26

## 2025-07-13 RX ADMIN — AMITRIPTYLINE HYDROCHLORIDE 30 MG: 10 TABLET, FILM COATED ORAL at 22:25

## 2025-07-13 RX ADMIN — INSULIN LISPRO 4 UNITS: 100 INJECTION, SOLUTION INTRAVENOUS; SUBCUTANEOUS at 17:11

## 2025-07-13 RX ADMIN — INSULIN GLARGINE 20 UNITS: 100 INJECTION, SOLUTION SUBCUTANEOUS at 09:24

## 2025-07-13 RX ADMIN — INSULIN LISPRO 6 UNITS: 100 INJECTION, SOLUTION INTRAVENOUS; SUBCUTANEOUS at 17:11

## 2025-07-13 RX ADMIN — INSULIN GLARGINE 20 UNITS: 100 INJECTION, SOLUTION SUBCUTANEOUS at 22:26

## 2025-07-13 RX ADMIN — SODIUM BICARBONATE 650 MG TABLET 650 MG: at 09:24

## 2025-07-13 RX ADMIN — ACETAMINOPHEN 975 MG: 325 TABLET ORAL at 22:26

## 2025-07-13 RX ADMIN — ACETAMINOPHEN 975 MG: 325 TABLET ORAL at 14:19

## 2025-07-13 RX ADMIN — INSULIN LISPRO 2 UNITS: 100 INJECTION, SOLUTION INTRAVENOUS; SUBCUTANEOUS at 12:37

## 2025-07-13 RX ADMIN — HEPARIN SODIUM 18 UNITS/KG/HR: 10000 INJECTION, SOLUTION INTRAVENOUS at 20:57

## 2025-07-13 RX ADMIN — MIRTAZAPINE 7.5 MG: 15 TABLET, FILM COATED ORAL at 22:26

## 2025-07-13 RX ADMIN — SODIUM BICARBONATE 650 MG TABLET 650 MG: at 17:10

## 2025-07-13 RX ADMIN — RIMEGEPANT SULFATE 75 MG: 75 TABLET, ORALLY DISINTEGRATING ORAL at 05:30

## 2025-07-13 RX ADMIN — HEPARIN SODIUM 5000 UNITS: 1000 INJECTION INTRAVENOUS; SUBCUTANEOUS at 14:20

## 2025-07-13 RX ADMIN — INSULIN LISPRO 2 UNITS: 100 INJECTION, SOLUTION INTRAVENOUS; SUBCUTANEOUS at 09:25

## 2025-07-13 RX ADMIN — ACETAMINOPHEN 975 MG: 325 TABLET ORAL at 05:30

## 2025-07-13 RX ADMIN — HEPARIN SODIUM 16 UNITS/KG/HR: 10000 INJECTION, SOLUTION INTRAVENOUS at 06:32

## 2025-07-13 RX ADMIN — CEFAZOLIN SODIUM 1000 MG: 1 SOLUTION INTRAVENOUS at 17:11

## 2025-07-13 RX ADMIN — INSULIN LISPRO 6 UNITS: 100 INJECTION, SOLUTION INTRAVENOUS; SUBCUTANEOUS at 09:25

## 2025-07-13 RX ADMIN — PANTOPRAZOLE SODIUM 40 MG: 40 TABLET, DELAYED RELEASE ORAL at 06:29

## 2025-07-13 RX ADMIN — TRAMADOL HYDROCHLORIDE 100 MG: 50 TABLET, COATED ORAL at 22:26

## 2025-07-13 RX ADMIN — METOCLOPRAMIDE 10 MG: 5 INJECTION, SOLUTION INTRAMUSCULAR; INTRAVENOUS at 05:10

## 2025-07-14 ENCOUNTER — APPOINTMENT (INPATIENT)
Dept: NON INVASIVE DIAGNOSTICS | Facility: HOSPITAL | Age: 60
DRG: 682 | End: 2025-07-14
Payer: MEDICARE

## 2025-07-14 PROBLEM — M79.642 LEFT HAND PAIN: Status: ACTIVE | Noted: 2025-07-14

## 2025-07-14 LAB
ANION GAP SERPL CALCULATED.3IONS-SCNC: 10 MMOL/L (ref 4–13)
APTT PPP: 71 SECONDS (ref 23–34)
BUN SERPL-MCNC: 58 MG/DL (ref 5–25)
CALCIUM SERPL-MCNC: 7.4 MG/DL (ref 8.4–10.2)
CHLORIDE SERPL-SCNC: 99 MMOL/L (ref 96–108)
CO2 SERPL-SCNC: 25 MMOL/L (ref 21–32)
CREAT SERPL-MCNC: 1.94 MG/DL (ref 0.6–1.3)
ERYTHROCYTE [DISTWIDTH] IN BLOOD BY AUTOMATED COUNT: 13.3 % (ref 11.6–15.1)
GFR SERPL CREATININE-BSD FRML MDRD: 36 ML/MIN/1.73SQ M
GLUCOSE SERPL-MCNC: 129 MG/DL (ref 65–140)
GLUCOSE SERPL-MCNC: 145 MG/DL (ref 65–140)
GLUCOSE SERPL-MCNC: 148 MG/DL (ref 65–140)
GLUCOSE SERPL-MCNC: 151 MG/DL (ref 65–140)
GLUCOSE SERPL-MCNC: 157 MG/DL (ref 65–140)
GLUCOSE SERPL-MCNC: 159 MG/DL (ref 65–140)
GLUCOSE SERPL-MCNC: 194 MG/DL (ref 65–140)
GLUCOSE SERPL-MCNC: 222 MG/DL (ref 65–140)
HCT VFR BLD AUTO: 28.3 % (ref 36.5–49.3)
HGB BLD-MCNC: 9.2 G/DL (ref 12–17)
MCH RBC QN AUTO: 29.2 PG (ref 26.8–34.3)
MCHC RBC AUTO-ENTMCNC: 32.5 G/DL (ref 31.4–37.4)
MCV RBC AUTO: 90 FL (ref 82–98)
NRBC BLD AUTO-RTO: 0 /100 WBCS
PLATELET # BLD AUTO: 118 THOUSANDS/UL (ref 149–390)
PMV BLD AUTO: 11.7 FL (ref 8.9–12.7)
POTASSIUM SERPL-SCNC: 3.3 MMOL/L (ref 3.5–5.3)
RBC # BLD AUTO: 3.15 MILLION/UL (ref 3.88–5.62)
SODIUM SERPL-SCNC: 134 MMOL/L (ref 135–147)
WBC # BLD AUTO: 6.76 THOUSAND/UL (ref 4.31–10.16)

## 2025-07-14 PROCEDURE — 99232 SBSQ HOSP IP/OBS MODERATE 35: CPT | Performed by: STUDENT IN AN ORGANIZED HEALTH CARE EDUCATION/TRAINING PROGRAM

## 2025-07-14 PROCEDURE — 97116 GAIT TRAINING THERAPY: CPT

## 2025-07-14 PROCEDURE — G0545 PR INHERENT VISIT TO INPT: HCPCS | Performed by: INTERNAL MEDICINE

## 2025-07-14 PROCEDURE — 97530 THERAPEUTIC ACTIVITIES: CPT

## 2025-07-14 PROCEDURE — 82948 REAGENT STRIP/BLOOD GLUCOSE: CPT

## 2025-07-14 PROCEDURE — 99233 SBSQ HOSP IP/OBS HIGH 50: CPT | Performed by: INTERNAL MEDICINE

## 2025-07-14 PROCEDURE — 85027 COMPLETE CBC AUTOMATED: CPT | Performed by: STUDENT IN AN ORGANIZED HEALTH CARE EDUCATION/TRAINING PROGRAM

## 2025-07-14 PROCEDURE — 85730 THROMBOPLASTIN TIME PARTIAL: CPT | Performed by: STUDENT IN AN ORGANIZED HEALTH CARE EDUCATION/TRAINING PROGRAM

## 2025-07-14 PROCEDURE — 99232 SBSQ HOSP IP/OBS MODERATE 35: CPT | Performed by: INTERNAL MEDICINE

## 2025-07-14 PROCEDURE — 93971 EXTREMITY STUDY: CPT | Performed by: SURGERY

## 2025-07-14 PROCEDURE — 80048 BASIC METABOLIC PNL TOTAL CA: CPT | Performed by: STUDENT IN AN ORGANIZED HEALTH CARE EDUCATION/TRAINING PROGRAM

## 2025-07-14 PROCEDURE — 97110 THERAPEUTIC EXERCISES: CPT

## 2025-07-14 PROCEDURE — 93971 EXTREMITY STUDY: CPT

## 2025-07-14 RX ORDER — POTASSIUM CHLORIDE 1500 MG/1
40 TABLET, EXTENDED RELEASE ORAL ONCE
Status: COMPLETED | OUTPATIENT
Start: 2025-07-14 | End: 2025-07-14

## 2025-07-14 RX ADMIN — PANTOPRAZOLE SODIUM 40 MG: 40 TABLET, DELAYED RELEASE ORAL at 17:33

## 2025-07-14 RX ADMIN — INSULIN LISPRO 6 UNITS: 100 INJECTION, SOLUTION INTRAVENOUS; SUBCUTANEOUS at 17:49

## 2025-07-14 RX ADMIN — AMITRIPTYLINE HYDROCHLORIDE 30 MG: 10 TABLET, FILM COATED ORAL at 22:23

## 2025-07-14 RX ADMIN — INSULIN LISPRO 2 UNITS: 100 INJECTION, SOLUTION INTRAVENOUS; SUBCUTANEOUS at 08:40

## 2025-07-14 RX ADMIN — ACETAMINOPHEN 975 MG: 325 TABLET ORAL at 06:40

## 2025-07-14 RX ADMIN — MIRTAZAPINE 7.5 MG: 15 TABLET, FILM COATED ORAL at 22:23

## 2025-07-14 RX ADMIN — POTASSIUM CHLORIDE 40 MEQ: 1500 TABLET, EXTENDED RELEASE ORAL at 08:39

## 2025-07-14 RX ADMIN — INSULIN GLARGINE 20 UNITS: 100 INJECTION, SOLUTION SUBCUTANEOUS at 08:40

## 2025-07-14 RX ADMIN — SODIUM BICARBONATE 650 MG TABLET 650 MG: at 08:30

## 2025-07-14 RX ADMIN — METOCLOPRAMIDE 10 MG: 5 INJECTION, SOLUTION INTRAMUSCULAR; INTRAVENOUS at 04:20

## 2025-07-14 RX ADMIN — CEFAZOLIN SODIUM 1000 MG: 1 SOLUTION INTRAVENOUS at 08:30

## 2025-07-14 RX ADMIN — TRAMADOL HYDROCHLORIDE 100 MG: 50 TABLET, COATED ORAL at 22:22

## 2025-07-14 RX ADMIN — RIVAROXABAN 20 MG: 20 TABLET, FILM COATED ORAL at 17:35

## 2025-07-14 RX ADMIN — CEFAZOLIN SODIUM 1000 MG: 1 SOLUTION INTRAVENOUS at 00:52

## 2025-07-14 RX ADMIN — HYDROMORPHONE HYDROCHLORIDE 0.2 MG: 0.2 INJECTION, SOLUTION INTRAMUSCULAR; INTRAVENOUS; SUBCUTANEOUS at 14:19

## 2025-07-14 RX ADMIN — CEFAZOLIN SODIUM 1000 MG: 1 SOLUTION INTRAVENOUS at 17:37

## 2025-07-14 RX ADMIN — PANTOPRAZOLE SODIUM 40 MG: 40 TABLET, DELAYED RELEASE ORAL at 06:40

## 2025-07-14 RX ADMIN — ALLOPURINOL 50 MG: 100 TABLET ORAL at 08:30

## 2025-07-14 RX ADMIN — INSULIN LISPRO 1 UNITS: 100 INJECTION, SOLUTION INTRAVENOUS; SUBCUTANEOUS at 22:25

## 2025-07-14 RX ADMIN — INSULIN LISPRO 6 UNITS: 100 INJECTION, SOLUTION INTRAVENOUS; SUBCUTANEOUS at 08:40

## 2025-07-14 RX ADMIN — INSULIN LISPRO 2 UNITS: 100 INJECTION, SOLUTION INTRAVENOUS; SUBCUTANEOUS at 17:49

## 2025-07-14 RX ADMIN — INSULIN LISPRO 6 UNITS: 100 INJECTION, SOLUTION INTRAVENOUS; SUBCUTANEOUS at 12:37

## 2025-07-14 RX ADMIN — ACETAMINOPHEN 975 MG: 325 TABLET ORAL at 22:22

## 2025-07-14 RX ADMIN — HEPARIN SODIUM 18 UNITS/KG/HR: 10000 INJECTION, SOLUTION INTRAVENOUS at 08:41

## 2025-07-14 RX ADMIN — INSULIN GLARGINE 20 UNITS: 100 INJECTION, SOLUTION SUBCUTANEOUS at 22:23

## 2025-07-15 LAB
ALBUMIN SERPL BCG-MCNC: 2.5 G/DL (ref 3.5–5)
ALP SERPL-CCNC: 74 U/L (ref 34–104)
ALT SERPL W P-5'-P-CCNC: 5 U/L (ref 7–52)
ANION GAP SERPL CALCULATED.3IONS-SCNC: 9 MMOL/L (ref 4–13)
AST SERPL W P-5'-P-CCNC: 13 U/L (ref 13–39)
BILIRUB DIRECT SERPL-MCNC: 0.06 MG/DL (ref 0–0.2)
BILIRUB SERPL-MCNC: 0.29 MG/DL (ref 0.2–1)
BUN SERPL-MCNC: 47 MG/DL (ref 5–25)
CALCIUM SERPL-MCNC: 7.3 MG/DL (ref 8.4–10.2)
CHLORIDE SERPL-SCNC: 100 MMOL/L (ref 96–108)
CO2 SERPL-SCNC: 24 MMOL/L (ref 21–32)
CREAT SERPL-MCNC: 1.85 MG/DL (ref 0.6–1.3)
ERYTHROCYTE [DISTWIDTH] IN BLOOD BY AUTOMATED COUNT: 13.3 % (ref 11.6–15.1)
GFR SERPL CREATININE-BSD FRML MDRD: 38 ML/MIN/1.73SQ M
GLUCOSE SERPL-MCNC: 159 MG/DL (ref 65–140)
GLUCOSE SERPL-MCNC: 167 MG/DL (ref 65–140)
GLUCOSE SERPL-MCNC: 223 MG/DL (ref 65–140)
GLUCOSE SERPL-MCNC: 248 MG/DL (ref 65–140)
GLUCOSE SERPL-MCNC: 276 MG/DL (ref 65–140)
HCT VFR BLD AUTO: 28 % (ref 36.5–49.3)
HGB BLD-MCNC: 8.8 G/DL (ref 12–17)
MCH RBC QN AUTO: 28.8 PG (ref 26.8–34.3)
MCHC RBC AUTO-ENTMCNC: 31.4 G/DL (ref 31.4–37.4)
MCV RBC AUTO: 92 FL (ref 82–98)
PLATELET # BLD AUTO: 147 THOUSANDS/UL (ref 149–390)
PMV BLD AUTO: 10.7 FL (ref 8.9–12.7)
POTASSIUM SERPL-SCNC: 3.6 MMOL/L (ref 3.5–5.3)
PROT SERPL-MCNC: 5.2 G/DL (ref 6.4–8.4)
RBC # BLD AUTO: 3.06 MILLION/UL (ref 3.88–5.62)
SODIUM SERPL-SCNC: 133 MMOL/L (ref 135–147)
WBC # BLD AUTO: 4.77 THOUSAND/UL (ref 4.31–10.16)

## 2025-07-15 PROCEDURE — 97530 THERAPEUTIC ACTIVITIES: CPT

## 2025-07-15 PROCEDURE — 80076 HEPATIC FUNCTION PANEL: CPT | Performed by: INTERNAL MEDICINE

## 2025-07-15 PROCEDURE — 80048 BASIC METABOLIC PNL TOTAL CA: CPT | Performed by: STUDENT IN AN ORGANIZED HEALTH CARE EDUCATION/TRAINING PROGRAM

## 2025-07-15 PROCEDURE — 82948 REAGENT STRIP/BLOOD GLUCOSE: CPT

## 2025-07-15 PROCEDURE — NC001 PR NO CHARGE: Performed by: STUDENT IN AN ORGANIZED HEALTH CARE EDUCATION/TRAINING PROGRAM

## 2025-07-15 PROCEDURE — 99232 SBSQ HOSP IP/OBS MODERATE 35: CPT | Performed by: INTERNAL MEDICINE

## 2025-07-15 PROCEDURE — 85027 COMPLETE CBC AUTOMATED: CPT | Performed by: STUDENT IN AN ORGANIZED HEALTH CARE EDUCATION/TRAINING PROGRAM

## 2025-07-15 PROCEDURE — 97535 SELF CARE MNGMENT TRAINING: CPT

## 2025-07-15 PROCEDURE — G0545 PR INHERENT VISIT TO INPT: HCPCS | Performed by: INTERNAL MEDICINE

## 2025-07-15 RX ADMIN — TAMSULOSIN HYDROCHLORIDE 0.4 MG: 0.4 CAPSULE ORAL at 17:40

## 2025-07-15 RX ADMIN — CEFAZOLIN SODIUM 1000 MG: 1 SOLUTION INTRAVENOUS at 00:51

## 2025-07-15 RX ADMIN — INSULIN LISPRO 2 UNITS: 100 INJECTION, SOLUTION INTRAVENOUS; SUBCUTANEOUS at 12:00

## 2025-07-15 RX ADMIN — TRAMADOL HYDROCHLORIDE 100 MG: 50 TABLET, COATED ORAL at 21:12

## 2025-07-15 RX ADMIN — MIRTAZAPINE 7.5 MG: 15 TABLET, FILM COATED ORAL at 21:12

## 2025-07-15 RX ADMIN — RIVAROXABAN 20 MG: 20 TABLET, FILM COATED ORAL at 17:40

## 2025-07-15 RX ADMIN — CEFAZOLIN SODIUM 1000 MG: 1 SOLUTION INTRAVENOUS at 17:40

## 2025-07-15 RX ADMIN — RIMEGEPANT SULFATE 75 MG: 75 TABLET, ORALLY DISINTEGRATING ORAL at 21:15

## 2025-07-15 RX ADMIN — INSULIN LISPRO 2 UNITS: 100 INJECTION, SOLUTION INTRAVENOUS; SUBCUTANEOUS at 21:12

## 2025-07-15 RX ADMIN — PANTOPRAZOLE SODIUM 40 MG: 40 TABLET, DELAYED RELEASE ORAL at 17:41

## 2025-07-15 RX ADMIN — ACETAMINOPHEN 975 MG: 325 TABLET ORAL at 14:11

## 2025-07-15 RX ADMIN — ACETAMINOPHEN 975 MG: 325 TABLET ORAL at 21:11

## 2025-07-15 RX ADMIN — CEFAZOLIN SODIUM 1000 MG: 1 SOLUTION INTRAVENOUS at 09:06

## 2025-07-15 RX ADMIN — PANTOPRAZOLE SODIUM 40 MG: 40 TABLET, DELAYED RELEASE ORAL at 05:17

## 2025-07-15 RX ADMIN — ACETAMINOPHEN 975 MG: 325 TABLET ORAL at 05:17

## 2025-07-15 RX ADMIN — INSULIN GLARGINE 20 UNITS: 100 INJECTION, SOLUTION SUBCUTANEOUS at 21:11

## 2025-07-15 RX ADMIN — AMITRIPTYLINE HYDROCHLORIDE 30 MG: 10 TABLET, FILM COATED ORAL at 21:11

## 2025-07-15 RX ADMIN — INSULIN LISPRO 6 UNITS: 100 INJECTION, SOLUTION INTRAVENOUS; SUBCUTANEOUS at 17:40

## 2025-07-15 RX ADMIN — INSULIN GLARGINE 20 UNITS: 100 INJECTION, SOLUTION SUBCUTANEOUS at 10:18

## 2025-07-15 RX ADMIN — INSULIN LISPRO 6 UNITS: 100 INJECTION, SOLUTION INTRAVENOUS; SUBCUTANEOUS at 17:41

## 2025-07-16 ENCOUNTER — APPOINTMENT (INPATIENT)
Dept: RADIOLOGY | Facility: HOSPITAL | Age: 60
DRG: 682 | End: 2025-07-16
Payer: MEDICARE

## 2025-07-16 LAB
ALBUMIN SERPL BCG-MCNC: 2.4 G/DL (ref 3.5–5)
ALP SERPL-CCNC: 79 U/L (ref 34–104)
ALT SERPL W P-5'-P-CCNC: 3 U/L (ref 7–52)
ANION GAP SERPL CALCULATED.3IONS-SCNC: 10 MMOL/L (ref 4–13)
AST SERPL W P-5'-P-CCNC: 11 U/L (ref 13–39)
BILIRUB SERPL-MCNC: 0.38 MG/DL (ref 0.2–1)
BUN SERPL-MCNC: 34 MG/DL (ref 5–25)
CALCIUM ALBUM COR SERPL-MCNC: 8.7 MG/DL (ref 8.3–10.1)
CALCIUM SERPL-MCNC: 7.4 MG/DL (ref 8.4–10.2)
CHLORIDE SERPL-SCNC: 101 MMOL/L (ref 96–108)
CO2 SERPL-SCNC: 24 MMOL/L (ref 21–32)
CREAT SERPL-MCNC: 1.72 MG/DL (ref 0.6–1.3)
ERYTHROCYTE [DISTWIDTH] IN BLOOD BY AUTOMATED COUNT: 13.3 % (ref 11.6–15.1)
GFR SERPL CREATININE-BSD FRML MDRD: 41 ML/MIN/1.73SQ M
GLUCOSE SERPL-MCNC: 136 MG/DL (ref 65–140)
GLUCOSE SERPL-MCNC: 156 MG/DL (ref 65–140)
GLUCOSE SERPL-MCNC: 158 MG/DL (ref 65–140)
GLUCOSE SERPL-MCNC: 177 MG/DL (ref 65–140)
GLUCOSE SERPL-MCNC: 234 MG/DL (ref 65–140)
HCT VFR BLD AUTO: 27.5 % (ref 36.5–49.3)
HGB BLD-MCNC: 8.7 G/DL (ref 12–17)
KLEBSIELLA SP DNA BLD POS QL NAA+NON-PRB: DETECTED
MCH RBC QN AUTO: 28.9 PG (ref 26.8–34.3)
MCHC RBC AUTO-ENTMCNC: 31.6 G/DL (ref 31.4–37.4)
MCV RBC AUTO: 91 FL (ref 82–98)
PLATELET # BLD AUTO: 174 THOUSANDS/UL (ref 149–390)
PMV BLD AUTO: 10.6 FL (ref 8.9–12.7)
POTASSIUM SERPL-SCNC: 3.6 MMOL/L (ref 3.5–5.3)
PROT SERPL-MCNC: 5.2 G/DL (ref 6.4–8.4)
RBC # BLD AUTO: 3.01 MILLION/UL (ref 3.88–5.62)
SODIUM SERPL-SCNC: 135 MMOL/L (ref 135–147)
WBC # BLD AUTO: 5.77 THOUSAND/UL (ref 4.31–10.16)

## 2025-07-16 PROCEDURE — 99233 SBSQ HOSP IP/OBS HIGH 50: CPT | Performed by: INTERNAL MEDICINE

## 2025-07-16 PROCEDURE — 74176 CT ABD & PELVIS W/O CONTRAST: CPT

## 2025-07-16 PROCEDURE — 80053 COMPREHEN METABOLIC PANEL: CPT | Performed by: INTERNAL MEDICINE

## 2025-07-16 PROCEDURE — 99232 SBSQ HOSP IP/OBS MODERATE 35: CPT | Performed by: INTERNAL MEDICINE

## 2025-07-16 PROCEDURE — 82948 REAGENT STRIP/BLOOD GLUCOSE: CPT

## 2025-07-16 PROCEDURE — 99222 1ST HOSP IP/OBS MODERATE 55: CPT | Performed by: UROLOGY

## 2025-07-16 PROCEDURE — G0545 PR INHERENT VISIT TO INPT: HCPCS | Performed by: INTERNAL MEDICINE

## 2025-07-16 PROCEDURE — 85027 COMPLETE CBC AUTOMATED: CPT | Performed by: INTERNAL MEDICINE

## 2025-07-16 RX ORDER — GUAIFENESIN 100 MG/5ML
200 SOLUTION ORAL EVERY 4 HOURS PRN
Status: DISCONTINUED | OUTPATIENT
Start: 2025-07-16 | End: 2025-08-01 | Stop reason: HOSPADM

## 2025-07-16 RX ADMIN — INSULIN LISPRO 6 UNITS: 100 INJECTION, SOLUTION INTRAVENOUS; SUBCUTANEOUS at 16:54

## 2025-07-16 RX ADMIN — PANTOPRAZOLE SODIUM 40 MG: 40 TABLET, DELAYED RELEASE ORAL at 16:53

## 2025-07-16 RX ADMIN — CEFAZOLIN SODIUM 1000 MG: 1 SOLUTION INTRAVENOUS at 10:31

## 2025-07-16 RX ADMIN — ACETAMINOPHEN 975 MG: 325 TABLET ORAL at 21:22

## 2025-07-16 RX ADMIN — INSULIN GLARGINE 20 UNITS: 100 INJECTION, SOLUTION SUBCUTANEOUS at 11:35

## 2025-07-16 RX ADMIN — AMITRIPTYLINE HYDROCHLORIDE 30 MG: 10 TABLET, FILM COATED ORAL at 21:23

## 2025-07-16 RX ADMIN — METHOCARBAMOL 750 MG: 750 TABLET ORAL at 10:59

## 2025-07-16 RX ADMIN — RIVAROXABAN 20 MG: 20 TABLET, FILM COATED ORAL at 16:53

## 2025-07-16 RX ADMIN — CEFAZOLIN SODIUM 1000 MG: 1 SOLUTION INTRAVENOUS at 01:22

## 2025-07-16 RX ADMIN — INSULIN LISPRO 2 UNITS: 100 INJECTION, SOLUTION INTRAVENOUS; SUBCUTANEOUS at 16:53

## 2025-07-16 RX ADMIN — PANTOPRAZOLE SODIUM 40 MG: 40 TABLET, DELAYED RELEASE ORAL at 10:31

## 2025-07-16 RX ADMIN — INSULIN GLARGINE 20 UNITS: 100 INJECTION, SOLUTION SUBCUTANEOUS at 21:23

## 2025-07-16 RX ADMIN — TAMSULOSIN HYDROCHLORIDE 0.4 MG: 0.4 CAPSULE ORAL at 16:53

## 2025-07-16 RX ADMIN — HYDROMORPHONE HYDROCHLORIDE 0.2 MG: 0.2 INJECTION, SOLUTION INTRAMUSCULAR; INTRAVENOUS; SUBCUTANEOUS at 01:25

## 2025-07-16 RX ADMIN — MIRTAZAPINE 7.5 MG: 15 TABLET, FILM COATED ORAL at 21:22

## 2025-07-16 RX ADMIN — HYDROMORPHONE HYDROCHLORIDE 0.2 MG: 0.2 INJECTION, SOLUTION INTRAMUSCULAR; INTRAVENOUS; SUBCUTANEOUS at 10:58

## 2025-07-16 RX ADMIN — GUAIFENESIN 200 MG: 200 SOLUTION ORAL at 02:12

## 2025-07-16 RX ADMIN — TRAMADOL HYDROCHLORIDE 100 MG: 50 TABLET, COATED ORAL at 21:22

## 2025-07-16 RX ADMIN — LIDOCAINE 1 PATCH: 700 PATCH TOPICAL at 10:58

## 2025-07-16 RX ADMIN — INSULIN LISPRO 6 UNITS: 100 INJECTION, SOLUTION INTRAVENOUS; SUBCUTANEOUS at 11:52

## 2025-07-16 RX ADMIN — CEFAZOLIN SODIUM 1000 MG: 1 SOLUTION INTRAVENOUS at 17:53

## 2025-07-17 ENCOUNTER — TELEPHONE (OUTPATIENT)
Dept: OTHER | Facility: HOSPITAL | Age: 60
End: 2025-07-17

## 2025-07-17 LAB
ALBUMIN SERPL BCG-MCNC: 2.6 G/DL (ref 3.5–5)
ALP SERPL-CCNC: 68 U/L (ref 34–104)
ALT SERPL W P-5'-P-CCNC: 3 U/L (ref 7–52)
ANION GAP SERPL CALCULATED.3IONS-SCNC: 6 MMOL/L (ref 4–13)
AST SERPL W P-5'-P-CCNC: 12 U/L (ref 13–39)
BASOPHILS # BLD MANUAL: 0.12 THOUSAND/UL (ref 0–0.1)
BASOPHILS NFR MAR MANUAL: 2 % (ref 0–1)
BILIRUB SERPL-MCNC: 0.31 MG/DL (ref 0.2–1)
BUN SERPL-MCNC: 26 MG/DL (ref 5–25)
CALCIUM ALBUM COR SERPL-MCNC: 8.6 MG/DL (ref 8.3–10.1)
CALCIUM SERPL-MCNC: 7.5 MG/DL (ref 8.4–10.2)
CHLORIDE SERPL-SCNC: 103 MMOL/L (ref 96–108)
CO2 SERPL-SCNC: 26 MMOL/L (ref 21–32)
CREAT SERPL-MCNC: 1.5 MG/DL (ref 0.6–1.3)
EOSINOPHIL # BLD MANUAL: 0.06 THOUSAND/UL (ref 0–0.4)
EOSINOPHIL NFR BLD MANUAL: 1 % (ref 0–6)
ERYTHROCYTE [DISTWIDTH] IN BLOOD BY AUTOMATED COUNT: 13.5 % (ref 11.6–15.1)
GFR SERPL CREATININE-BSD FRML MDRD: 49 ML/MIN/1.73SQ M
GLUCOSE SERPL-MCNC: 108 MG/DL (ref 65–140)
GLUCOSE SERPL-MCNC: 127 MG/DL (ref 65–140)
GLUCOSE SERPL-MCNC: 138 MG/DL (ref 65–140)
GLUCOSE SERPL-MCNC: 184 MG/DL (ref 65–140)
HCT VFR BLD AUTO: 26.6 % (ref 36.5–49.3)
HGB BLD-MCNC: 8.4 G/DL (ref 12–17)
LYMPHOCYTES # BLD AUTO: 0.75 THOUSAND/UL (ref 0.6–4.47)
LYMPHOCYTES # BLD AUTO: 12 % (ref 14–44)
MCH RBC QN AUTO: 28.9 PG (ref 26.8–34.3)
MCHC RBC AUTO-ENTMCNC: 31.6 G/DL (ref 31.4–37.4)
MCV RBC AUTO: 91 FL (ref 82–98)
MONOCYTES # BLD AUTO: 0.52 THOUSAND/UL (ref 0–1.22)
MONOCYTES NFR BLD: 9 % (ref 4–12)
MYELOCYTE ABSOLUTE CT: 0.06 THOUSAND/UL (ref 0–0.1)
MYELOCYTES NFR BLD MANUAL: 1 % (ref 0–1)
NEUTROPHILS # BLD MANUAL: 4.27 THOUSAND/UL (ref 1.85–7.62)
NEUTS BAND NFR BLD MANUAL: 2 % (ref 0–8)
NEUTS SEG NFR BLD AUTO: 72 % (ref 43–75)
PLATELET # BLD AUTO: 214 THOUSANDS/UL (ref 149–390)
PLATELET BLD QL SMEAR: ADEQUATE
PMV BLD AUTO: 10.3 FL (ref 8.9–12.7)
POTASSIUM SERPL-SCNC: 3.4 MMOL/L (ref 3.5–5.3)
PROT SERPL-MCNC: 5.3 G/DL (ref 6.4–8.4)
RBC # BLD AUTO: 2.91 MILLION/UL (ref 3.88–5.62)
RBC MORPH BLD: NORMAL
SODIUM SERPL-SCNC: 135 MMOL/L (ref 135–147)
VARIANT LYMPHS # BLD AUTO: 1 %
WBC # BLD AUTO: 5.77 THOUSAND/UL (ref 4.31–10.16)

## 2025-07-17 PROCEDURE — 99232 SBSQ HOSP IP/OBS MODERATE 35: CPT | Performed by: INTERNAL MEDICINE

## 2025-07-17 PROCEDURE — 82948 REAGENT STRIP/BLOOD GLUCOSE: CPT

## 2025-07-17 PROCEDURE — 99233 SBSQ HOSP IP/OBS HIGH 50: CPT | Performed by: INTERNAL MEDICINE

## 2025-07-17 PROCEDURE — 85007 BL SMEAR W/DIFF WBC COUNT: CPT | Performed by: INTERNAL MEDICINE

## 2025-07-17 PROCEDURE — 80053 COMPREHEN METABOLIC PANEL: CPT | Performed by: INTERNAL MEDICINE

## 2025-07-17 PROCEDURE — G0545 PR INHERENT VISIT TO INPT: HCPCS | Performed by: INTERNAL MEDICINE

## 2025-07-17 PROCEDURE — 85027 COMPLETE CBC AUTOMATED: CPT | Performed by: INTERNAL MEDICINE

## 2025-07-17 RX ORDER — POTASSIUM CHLORIDE 1500 MG/1
40 TABLET, EXTENDED RELEASE ORAL ONCE
Status: COMPLETED | OUTPATIENT
Start: 2025-07-17 | End: 2025-07-17

## 2025-07-17 RX ADMIN — POTASSIUM CHLORIDE 40 MEQ: 1500 TABLET, EXTENDED RELEASE ORAL at 11:50

## 2025-07-17 RX ADMIN — TRAMADOL HYDROCHLORIDE 100 MG: 50 TABLET, COATED ORAL at 21:25

## 2025-07-17 RX ADMIN — INSULIN LISPRO 1 UNITS: 100 INJECTION, SOLUTION INTRAVENOUS; SUBCUTANEOUS at 21:26

## 2025-07-17 RX ADMIN — INSULIN GLARGINE 20 UNITS: 100 INJECTION, SOLUTION SUBCUTANEOUS at 21:25

## 2025-07-17 RX ADMIN — INSULIN LISPRO 6 UNITS: 100 INJECTION, SOLUTION INTRAVENOUS; SUBCUTANEOUS at 16:36

## 2025-07-17 RX ADMIN — INSULIN GLARGINE 20 UNITS: 100 INJECTION, SOLUTION SUBCUTANEOUS at 08:26

## 2025-07-17 RX ADMIN — ACETAMINOPHEN 975 MG: 325 TABLET ORAL at 21:25

## 2025-07-17 RX ADMIN — ACETAMINOPHEN 975 MG: 325 TABLET ORAL at 05:53

## 2025-07-17 RX ADMIN — PANTOPRAZOLE SODIUM 40 MG: 40 TABLET, DELAYED RELEASE ORAL at 16:35

## 2025-07-17 RX ADMIN — HYDROMORPHONE HYDROCHLORIDE 0.2 MG: 0.2 INJECTION, SOLUTION INTRAMUSCULAR; INTRAVENOUS; SUBCUTANEOUS at 13:00

## 2025-07-17 RX ADMIN — INSULIN LISPRO 6 UNITS: 100 INJECTION, SOLUTION INTRAVENOUS; SUBCUTANEOUS at 08:26

## 2025-07-17 RX ADMIN — HYDROMORPHONE HYDROCHLORIDE 0.2 MG: 0.2 INJECTION, SOLUTION INTRAMUSCULAR; INTRAVENOUS; SUBCUTANEOUS at 23:55

## 2025-07-17 RX ADMIN — TAMSULOSIN HYDROCHLORIDE 0.4 MG: 0.4 CAPSULE ORAL at 16:35

## 2025-07-17 RX ADMIN — AMITRIPTYLINE HYDROCHLORIDE 30 MG: 10 TABLET, FILM COATED ORAL at 21:25

## 2025-07-17 RX ADMIN — METOCLOPRAMIDE 10 MG: 5 INJECTION, SOLUTION INTRAMUSCULAR; INTRAVENOUS at 13:01

## 2025-07-17 RX ADMIN — RIVAROXABAN 20 MG: 20 TABLET, FILM COATED ORAL at 16:35

## 2025-07-17 RX ADMIN — PANTOPRAZOLE SODIUM 40 MG: 40 TABLET, DELAYED RELEASE ORAL at 06:09

## 2025-07-17 RX ADMIN — MIRTAZAPINE 7.5 MG: 15 TABLET, FILM COATED ORAL at 21:27

## 2025-07-17 RX ADMIN — CEFAZOLIN SODIUM 1000 MG: 1 SOLUTION INTRAVENOUS at 08:27

## 2025-07-17 RX ADMIN — INSULIN LISPRO 6 UNITS: 100 INJECTION, SOLUTION INTRAVENOUS; SUBCUTANEOUS at 12:56

## 2025-07-17 RX ADMIN — ALLOPURINOL 50 MG: 100 TABLET ORAL at 08:26

## 2025-07-17 RX ADMIN — CEFAZOLIN SODIUM 1000 MG: 1 SOLUTION INTRAVENOUS at 00:43

## 2025-07-17 RX ADMIN — CEFAZOLIN SODIUM 1000 MG: 1 SOLUTION INTRAVENOUS at 16:35

## 2025-07-17 NOTE — TELEPHONE ENCOUNTER
Patient seen in consultation for acute urinary retention.  Patient has Pina catheter removed and had successful void trial on 7/17.  Please arrange outpatient follow-up for repeat PVR early next week in addition to rescheduling provider follow-up previously canceled

## 2025-07-18 ENCOUNTER — APPOINTMENT (INPATIENT)
Dept: NON INVASIVE DIAGNOSTICS | Facility: HOSPITAL | Age: 60
DRG: 682 | End: 2025-07-18
Payer: MEDICARE

## 2025-07-18 PROBLEM — R60.0 PERIPHERAL EDEMA: Status: ACTIVE | Noted: 2025-07-18

## 2025-07-18 LAB
ALBUMIN SERPL BCG-MCNC: 2.6 G/DL (ref 3.5–5)
ALP SERPL-CCNC: 73 U/L (ref 34–104)
ALT SERPL W P-5'-P-CCNC: 3 U/L (ref 7–52)
ANION GAP SERPL CALCULATED.3IONS-SCNC: 5 MMOL/L (ref 4–13)
AORTIC ROOT: 3.8 CM
ASCENDING AORTA: 3.7 CM
AST SERPL W P-5'-P-CCNC: 10 U/L (ref 13–39)
BACTERIA BLD CULT: ABNORMAL
BACTERIA BLD CULT: NORMAL
BILIRUB SERPL-MCNC: 0.23 MG/DL (ref 0.2–1)
BSA FOR ECHO PROCEDURE: 2.47 M2
BUN SERPL-MCNC: 23 MG/DL (ref 5–25)
CALCIUM ALBUM COR SERPL-MCNC: 8.4 MG/DL (ref 8.3–10.1)
CALCIUM SERPL-MCNC: 7.3 MG/DL (ref 8.4–10.2)
CHLORIDE SERPL-SCNC: 106 MMOL/L (ref 96–108)
CO2 SERPL-SCNC: 26 MMOL/L (ref 21–32)
CREAT SERPL-MCNC: 1.59 MG/DL (ref 0.6–1.3)
DOP CALC LVOT AREA: 4.52 CM2
DOP CALC LVOT DIAMETER: 2.4 CM
E WAVE DECELERATION TIME: 178 MS
E/A RATIO: 0.76
ERYTHROCYTE [DISTWIDTH] IN BLOOD BY AUTOMATED COUNT: 13.5 % (ref 11.6–15.1)
FRACTIONAL SHORTENING: 34 (ref 28–44)
GFR SERPL CREATININE-BSD FRML MDRD: 45 ML/MIN/1.73SQ M
GLUCOSE SERPL-MCNC: 168 MG/DL (ref 65–140)
GLUCOSE SERPL-MCNC: 180 MG/DL (ref 65–140)
GLUCOSE SERPL-MCNC: 180 MG/DL (ref 65–140)
GLUCOSE SERPL-MCNC: 205 MG/DL (ref 65–140)
GLUCOSE SERPL-MCNC: 229 MG/DL (ref 65–140)
GRAM STN SPEC: ABNORMAL
HCT VFR BLD AUTO: 27 % (ref 36.5–49.3)
HGB BLD-MCNC: 8.5 G/DL (ref 12–17)
INTERVENTRICULAR SEPTUM IN DIASTOLE (PARASTERNAL SHORT AXIS VIEW): 1.3 CM
INTERVENTRICULAR SEPTUM: 1.3 CM (ref 0.6–1.1)
LAAS-AP2: 24.7 CM2
LAAS-AP4: 21.6 CM2
LEFT ATRIUM SIZE: 4.4 CM
LEFT ATRIUM VOLUME (MOD BIPLANE): 78 ML
LEFT ATRIUM VOLUME INDEX (MOD BIPLANE): 31.6 ML/M2
LEFT INTERNAL DIMENSION IN SYSTOLE: 3.3 CM (ref 2.1–4)
LEFT VENTRICULAR INTERNAL DIMENSION IN DIASTOLE: 5 CM (ref 3.5–6)
LEFT VENTRICULAR POSTERIOR WALL IN END DIASTOLE: 1.3 CM
LEFT VENTRICULAR STROKE VOLUME: 74 ML
LV EF US.2D.A4C+ESTIMATED: 56 %
LVSV (TEICH): 74 ML
MCH RBC QN AUTO: 29 PG (ref 26.8–34.3)
MCHC RBC AUTO-ENTMCNC: 31.5 G/DL (ref 31.4–37.4)
MCV RBC AUTO: 92 FL (ref 82–98)
MV E'TISSUE VEL-LAT: 13 CM/S
MV E'TISSUE VEL-SEP: 9 CM/S
MV PEAK A VEL: 1.12 M/S
MV PEAK E VEL: 85 CM/S
MV STENOSIS PRESSURE HALF TIME: 52 MS
MV VALVE AREA P 1/2 METHOD: 4.23
PLATELET # BLD AUTO: 227 THOUSANDS/UL (ref 149–390)
PMV BLD AUTO: 9.7 FL (ref 8.9–12.7)
POTASSIUM SERPL-SCNC: 3.9 MMOL/L (ref 3.5–5.3)
PROT SERPL-MCNC: 5.4 G/DL (ref 6.4–8.4)
RBC # BLD AUTO: 2.93 MILLION/UL (ref 3.88–5.62)
RIGHT ATRIUM AREA SYSTOLE A4C: 16.4 CM2
RIGHT VENTRICLE ID DIMENSION: 4.4 CM
SL CV LEFT ATRIUM LENGTH A2C: 6 CM
SL CV LV EF: 55
SL CV PED ECHO LEFT VENTRICLE DIASTOLIC VOLUME (MOD BIPLANE) 2D: 120 ML
SL CV PED ECHO LEFT VENTRICLE SYSTOLIC VOLUME (MOD BIPLANE) 2D: 46 ML
SODIUM SERPL-SCNC: 137 MMOL/L (ref 135–147)
TRICUSPID ANNULAR PLANE SYSTOLIC EXCURSION: 2.5 CM
WBC # BLD AUTO: 5.56 THOUSAND/UL (ref 4.31–10.16)

## 2025-07-18 PROCEDURE — 82948 REAGENT STRIP/BLOOD GLUCOSE: CPT

## 2025-07-18 PROCEDURE — 93306 TTE W/DOPPLER COMPLETE: CPT

## 2025-07-18 PROCEDURE — 85027 COMPLETE CBC AUTOMATED: CPT | Performed by: INTERNAL MEDICINE

## 2025-07-18 PROCEDURE — 99232 SBSQ HOSP IP/OBS MODERATE 35: CPT | Performed by: INTERNAL MEDICINE

## 2025-07-18 PROCEDURE — 97530 THERAPEUTIC ACTIVITIES: CPT

## 2025-07-18 PROCEDURE — 80053 COMPREHEN METABOLIC PANEL: CPT | Performed by: INTERNAL MEDICINE

## 2025-07-18 PROCEDURE — G0545 PR INHERENT VISIT TO INPT: HCPCS | Performed by: INTERNAL MEDICINE

## 2025-07-18 PROCEDURE — 93306 TTE W/DOPPLER COMPLETE: CPT | Performed by: STUDENT IN AN ORGANIZED HEALTH CARE EDUCATION/TRAINING PROGRAM

## 2025-07-18 RX ORDER — CARVEDILOL 25 MG/1
25 TABLET ORAL 2 TIMES DAILY WITH MEALS
Status: DISCONTINUED | OUTPATIENT
Start: 2025-07-18 | End: 2025-08-01 | Stop reason: HOSPADM

## 2025-07-18 RX ORDER — HYDROCHLOROTHIAZIDE 12.5 MG/1
12.5 TABLET ORAL 2 TIMES DAILY
Status: DISCONTINUED | OUTPATIENT
Start: 2025-07-18 | End: 2025-07-19

## 2025-07-18 RX ADMIN — INSULIN LISPRO 2 UNITS: 100 INJECTION, SOLUTION INTRAVENOUS; SUBCUTANEOUS at 12:28

## 2025-07-18 RX ADMIN — INSULIN GLARGINE 20 UNITS: 100 INJECTION, SOLUTION SUBCUTANEOUS at 09:02

## 2025-07-18 RX ADMIN — CEFAZOLIN SODIUM 1000 MG: 1 SOLUTION INTRAVENOUS at 00:55

## 2025-07-18 RX ADMIN — ACETAMINOPHEN 975 MG: 325 TABLET ORAL at 14:23

## 2025-07-18 RX ADMIN — INSULIN LISPRO 6 UNITS: 100 INJECTION, SOLUTION INTRAVENOUS; SUBCUTANEOUS at 18:05

## 2025-07-18 RX ADMIN — HYDROMORPHONE HYDROCHLORIDE 0.2 MG: 0.2 INJECTION, SOLUTION INTRAMUSCULAR; INTRAVENOUS; SUBCUTANEOUS at 09:07

## 2025-07-18 RX ADMIN — INSULIN GLARGINE 20 UNITS: 100 INJECTION, SOLUTION SUBCUTANEOUS at 22:50

## 2025-07-18 RX ADMIN — TRAMADOL HYDROCHLORIDE 100 MG: 50 TABLET, COATED ORAL at 22:48

## 2025-07-18 RX ADMIN — CEFAZOLIN SODIUM 1000 MG: 1 SOLUTION INTRAVENOUS at 16:19

## 2025-07-18 RX ADMIN — RIMEGEPANT SULFATE 75 MG: 75 TABLET, ORALLY DISINTEGRATING ORAL at 12:50

## 2025-07-18 RX ADMIN — CARVEDILOL 25 MG: 25 TABLET, FILM COATED ORAL at 16:22

## 2025-07-18 RX ADMIN — HYDROCHLOROTHIAZIDE 12.5 MG: 12.5 TABLET ORAL at 18:04

## 2025-07-18 RX ADMIN — ACETAMINOPHEN 975 MG: 325 TABLET ORAL at 22:48

## 2025-07-18 RX ADMIN — CEFAZOLIN SODIUM 1000 MG: 1 SOLUTION INTRAVENOUS at 09:02

## 2025-07-18 RX ADMIN — RIVAROXABAN 20 MG: 20 TABLET, FILM COATED ORAL at 16:22

## 2025-07-18 RX ADMIN — TAMSULOSIN HYDROCHLORIDE 0.4 MG: 0.4 CAPSULE ORAL at 16:22

## 2025-07-18 RX ADMIN — PANTOPRAZOLE SODIUM 40 MG: 40 TABLET, DELAYED RELEASE ORAL at 16:23

## 2025-07-18 RX ADMIN — INSULIN LISPRO 6 UNITS: 100 INJECTION, SOLUTION INTRAVENOUS; SUBCUTANEOUS at 08:59

## 2025-07-18 RX ADMIN — HYDROCHLOROTHIAZIDE 12.5 MG: 12.5 TABLET ORAL at 12:29

## 2025-07-18 RX ADMIN — PANTOPRAZOLE SODIUM 40 MG: 40 TABLET, DELAYED RELEASE ORAL at 09:10

## 2025-07-18 RX ADMIN — MIRTAZAPINE 7.5 MG: 15 TABLET, FILM COATED ORAL at 22:48

## 2025-07-18 RX ADMIN — INSULIN LISPRO 2 UNITS: 100 INJECTION, SOLUTION INTRAVENOUS; SUBCUTANEOUS at 22:50

## 2025-07-18 RX ADMIN — INSULIN LISPRO 2 UNITS: 100 INJECTION, SOLUTION INTRAVENOUS; SUBCUTANEOUS at 18:05

## 2025-07-18 RX ADMIN — LIDOCAINE 1 PATCH: 700 PATCH TOPICAL at 09:00

## 2025-07-18 RX ADMIN — METHOCARBAMOL 750 MG: 750 TABLET ORAL at 09:07

## 2025-07-18 RX ADMIN — INSULIN LISPRO 6 UNITS: 100 INJECTION, SOLUTION INTRAVENOUS; SUBCUTANEOUS at 12:28

## 2025-07-18 RX ADMIN — INSULIN LISPRO 2 UNITS: 100 INJECTION, SOLUTION INTRAVENOUS; SUBCUTANEOUS at 08:59

## 2025-07-18 RX ADMIN — AMITRIPTYLINE HYDROCHLORIDE 30 MG: 10 TABLET, FILM COATED ORAL at 22:48

## 2025-07-19 LAB
ANION GAP SERPL CALCULATED.3IONS-SCNC: 4 MMOL/L (ref 4–13)
BUN SERPL-MCNC: 19 MG/DL (ref 5–25)
CALCIUM SERPL-MCNC: 7.5 MG/DL (ref 8.4–10.2)
CHLORIDE SERPL-SCNC: 104 MMOL/L (ref 96–108)
CO2 SERPL-SCNC: 28 MMOL/L (ref 21–32)
CREAT SERPL-MCNC: 1.32 MG/DL (ref 0.6–1.3)
ERYTHROCYTE [DISTWIDTH] IN BLOOD BY AUTOMATED COUNT: 13.5 % (ref 11.6–15.1)
GFR SERPL CREATININE-BSD FRML MDRD: 57 ML/MIN/1.73SQ M
GLUCOSE SERPL-MCNC: 137 MG/DL (ref 65–140)
GLUCOSE SERPL-MCNC: 159 MG/DL (ref 65–140)
GLUCOSE SERPL-MCNC: 175 MG/DL (ref 65–140)
GLUCOSE SERPL-MCNC: 184 MG/DL (ref 65–140)
GLUCOSE SERPL-MCNC: 201 MG/DL (ref 65–140)
GLUCOSE SERPL-MCNC: 213 MG/DL (ref 65–140)
HCT VFR BLD AUTO: 26.3 % (ref 36.5–49.3)
HGB BLD-MCNC: 8.3 G/DL (ref 12–17)
MCH RBC QN AUTO: 29.5 PG (ref 26.8–34.3)
MCHC RBC AUTO-ENTMCNC: 31.6 G/DL (ref 31.4–37.4)
MCV RBC AUTO: 94 FL (ref 82–98)
PLATELET # BLD AUTO: 241 THOUSANDS/UL (ref 149–390)
PMV BLD AUTO: 9.6 FL (ref 8.9–12.7)
POTASSIUM SERPL-SCNC: 4.1 MMOL/L (ref 3.5–5.3)
RBC # BLD AUTO: 2.81 MILLION/UL (ref 3.88–5.62)
SODIUM SERPL-SCNC: 136 MMOL/L (ref 135–147)
WBC # BLD AUTO: 5.25 THOUSAND/UL (ref 4.31–10.16)

## 2025-07-19 PROCEDURE — 80048 BASIC METABOLIC PNL TOTAL CA: CPT | Performed by: INTERNAL MEDICINE

## 2025-07-19 PROCEDURE — 99233 SBSQ HOSP IP/OBS HIGH 50: CPT | Performed by: INTERNAL MEDICINE

## 2025-07-19 PROCEDURE — 82948 REAGENT STRIP/BLOOD GLUCOSE: CPT

## 2025-07-19 PROCEDURE — 99232 SBSQ HOSP IP/OBS MODERATE 35: CPT | Performed by: INTERNAL MEDICINE

## 2025-07-19 PROCEDURE — G0545 PR INHERENT VISIT TO INPT: HCPCS | Performed by: INTERNAL MEDICINE

## 2025-07-19 PROCEDURE — 85027 COMPLETE CBC AUTOMATED: CPT | Performed by: INTERNAL MEDICINE

## 2025-07-19 RX ORDER — HYDROCHLOROTHIAZIDE 25 MG/1
25 TABLET ORAL 2 TIMES DAILY
Status: DISCONTINUED | OUTPATIENT
Start: 2025-07-19 | End: 2025-08-01 | Stop reason: HOSPADM

## 2025-07-19 RX ADMIN — MIRTAZAPINE 7.5 MG: 15 TABLET, FILM COATED ORAL at 20:34

## 2025-07-19 RX ADMIN — METHOCARBAMOL 750 MG: 750 TABLET ORAL at 16:47

## 2025-07-19 RX ADMIN — CEFAZOLIN SODIUM 1000 MG: 1 SOLUTION INTRAVENOUS at 16:47

## 2025-07-19 RX ADMIN — CARVEDILOL 25 MG: 25 TABLET, FILM COATED ORAL at 16:48

## 2025-07-19 RX ADMIN — TRAMADOL HYDROCHLORIDE 100 MG: 50 TABLET, COATED ORAL at 20:34

## 2025-07-19 RX ADMIN — INSULIN LISPRO 6 UNITS: 100 INJECTION, SOLUTION INTRAVENOUS; SUBCUTANEOUS at 09:34

## 2025-07-19 RX ADMIN — ACETAMINOPHEN 975 MG: 325 TABLET ORAL at 12:17

## 2025-07-19 RX ADMIN — INSULIN GLARGINE 20 UNITS: 100 INJECTION, SOLUTION SUBCUTANEOUS at 20:34

## 2025-07-19 RX ADMIN — HYDROMORPHONE HYDROCHLORIDE 0.2 MG: 0.2 INJECTION, SOLUTION INTRAMUSCULAR; INTRAVENOUS; SUBCUTANEOUS at 01:42

## 2025-07-19 RX ADMIN — ACETAMINOPHEN 975 MG: 325 TABLET ORAL at 05:57

## 2025-07-19 RX ADMIN — METHOCARBAMOL 750 MG: 750 TABLET ORAL at 05:56

## 2025-07-19 RX ADMIN — HYDROCHLOROTHIAZIDE 12.5 MG: 12.5 TABLET ORAL at 09:26

## 2025-07-19 RX ADMIN — HYDROCHLOROTHIAZIDE 25 MG: 25 TABLET ORAL at 16:47

## 2025-07-19 RX ADMIN — INSULIN GLARGINE 20 UNITS: 100 INJECTION, SOLUTION SUBCUTANEOUS at 09:46

## 2025-07-19 RX ADMIN — INSULIN LISPRO 1 UNITS: 100 INJECTION, SOLUTION INTRAVENOUS; SUBCUTANEOUS at 20:35

## 2025-07-19 RX ADMIN — HYDROMORPHONE HYDROCHLORIDE 0.2 MG: 0.2 INJECTION, SOLUTION INTRAMUSCULAR; INTRAVENOUS; SUBCUTANEOUS at 16:45

## 2025-07-19 RX ADMIN — TAMSULOSIN HYDROCHLORIDE 0.4 MG: 0.4 CAPSULE ORAL at 16:48

## 2025-07-19 RX ADMIN — CEFAZOLIN SODIUM 1000 MG: 1 SOLUTION INTRAVENOUS at 09:24

## 2025-07-19 RX ADMIN — AMITRIPTYLINE HYDROCHLORIDE 30 MG: 10 TABLET, FILM COATED ORAL at 20:36

## 2025-07-19 RX ADMIN — PANTOPRAZOLE SODIUM 40 MG: 40 TABLET, DELAYED RELEASE ORAL at 06:01

## 2025-07-19 RX ADMIN — PANTOPRAZOLE SODIUM 40 MG: 40 TABLET, DELAYED RELEASE ORAL at 16:48

## 2025-07-19 RX ADMIN — INSULIN LISPRO 6 UNITS: 100 INJECTION, SOLUTION INTRAVENOUS; SUBCUTANEOUS at 12:17

## 2025-07-19 RX ADMIN — ACETAMINOPHEN 975 MG: 325 TABLET ORAL at 20:34

## 2025-07-19 RX ADMIN — INSULIN LISPRO 6 UNITS: 100 INJECTION, SOLUTION INTRAVENOUS; SUBCUTANEOUS at 16:51

## 2025-07-19 RX ADMIN — INSULIN LISPRO 2 UNITS: 100 INJECTION, SOLUTION INTRAVENOUS; SUBCUTANEOUS at 16:50

## 2025-07-19 RX ADMIN — CARVEDILOL 25 MG: 25 TABLET, FILM COATED ORAL at 09:27

## 2025-07-19 RX ADMIN — INSULIN LISPRO 4 UNITS: 100 INJECTION, SOLUTION INTRAVENOUS; SUBCUTANEOUS at 12:16

## 2025-07-19 RX ADMIN — CEFAZOLIN SODIUM 1000 MG: 1 SOLUTION INTRAVENOUS at 01:11

## 2025-07-20 LAB
ANION GAP SERPL CALCULATED.3IONS-SCNC: 4 MMOL/L (ref 4–13)
BASOPHILS # BLD AUTO: 0.05 THOUSANDS/ÂΜL (ref 0–0.1)
BASOPHILS NFR BLD AUTO: 1 % (ref 0–1)
BUN SERPL-MCNC: 20 MG/DL (ref 5–25)
CALCIUM SERPL-MCNC: 7.8 MG/DL (ref 8.4–10.2)
CHLORIDE SERPL-SCNC: 105 MMOL/L (ref 96–108)
CO2 SERPL-SCNC: 27 MMOL/L (ref 21–32)
CREAT SERPL-MCNC: 1.18 MG/DL (ref 0.6–1.3)
EOSINOPHIL # BLD AUTO: 0.08 THOUSAND/ÂΜL (ref 0–0.61)
EOSINOPHIL NFR BLD AUTO: 2 % (ref 0–6)
ERYTHROCYTE [DISTWIDTH] IN BLOOD BY AUTOMATED COUNT: 13.5 % (ref 11.6–15.1)
GFR SERPL CREATININE-BSD FRML MDRD: 65 ML/MIN/1.73SQ M
GLUCOSE SERPL-MCNC: 109 MG/DL (ref 65–140)
GLUCOSE SERPL-MCNC: 118 MG/DL (ref 65–140)
GLUCOSE SERPL-MCNC: 119 MG/DL (ref 65–140)
GLUCOSE SERPL-MCNC: 163 MG/DL (ref 65–140)
GLUCOSE SERPL-MCNC: 82 MG/DL (ref 65–140)
HCT VFR BLD AUTO: 25.3 % (ref 36.5–49.3)
HGB BLD-MCNC: 7.9 G/DL (ref 12–17)
IMM GRANULOCYTES # BLD AUTO: 0.05 THOUSAND/UL (ref 0–0.2)
IMM GRANULOCYTES NFR BLD AUTO: 1 % (ref 0–2)
LYMPHOCYTES # BLD AUTO: 0.8 THOUSANDS/ÂΜL (ref 0.6–4.47)
LYMPHOCYTES NFR BLD AUTO: 17 % (ref 14–44)
MCH RBC QN AUTO: 29.4 PG (ref 26.8–34.3)
MCHC RBC AUTO-ENTMCNC: 31.2 G/DL (ref 31.4–37.4)
MCV RBC AUTO: 94 FL (ref 82–98)
MONOCYTES # BLD AUTO: 0.43 THOUSAND/ÂΜL (ref 0.17–1.22)
MONOCYTES NFR BLD AUTO: 9 % (ref 4–12)
NEUTROPHILS # BLD AUTO: 3.37 THOUSANDS/ÂΜL (ref 1.85–7.62)
NEUTS SEG NFR BLD AUTO: 70 % (ref 43–75)
NRBC BLD AUTO-RTO: 0 /100 WBCS
PLATELET # BLD AUTO: 238 THOUSANDS/UL (ref 149–390)
PMV BLD AUTO: 9.7 FL (ref 8.9–12.7)
POTASSIUM SERPL-SCNC: 4.2 MMOL/L (ref 3.5–5.3)
RBC # BLD AUTO: 2.69 MILLION/UL (ref 3.88–5.62)
SODIUM SERPL-SCNC: 136 MMOL/L (ref 135–147)
WBC # BLD AUTO: 4.78 THOUSAND/UL (ref 4.31–10.16)

## 2025-07-20 PROCEDURE — 85025 COMPLETE CBC W/AUTO DIFF WBC: CPT | Performed by: INTERNAL MEDICINE

## 2025-07-20 PROCEDURE — 99232 SBSQ HOSP IP/OBS MODERATE 35: CPT | Performed by: INTERNAL MEDICINE

## 2025-07-20 PROCEDURE — 82948 REAGENT STRIP/BLOOD GLUCOSE: CPT

## 2025-07-20 PROCEDURE — G0545 PR INHERENT VISIT TO INPT: HCPCS | Performed by: INTERNAL MEDICINE

## 2025-07-20 PROCEDURE — 80048 BASIC METABOLIC PNL TOTAL CA: CPT | Performed by: INTERNAL MEDICINE

## 2025-07-20 RX ORDER — POLYETHYLENE GLYCOL 3350 17 G/17G
238 POWDER, FOR SOLUTION ORAL ONCE
Status: COMPLETED | OUTPATIENT
Start: 2025-07-20 | End: 2025-07-20

## 2025-07-20 RX ORDER — BISACODYL 5 MG/1
10 TABLET, DELAYED RELEASE ORAL ONCE
Status: COMPLETED | OUTPATIENT
Start: 2025-07-20 | End: 2025-07-20

## 2025-07-20 RX ORDER — MAGNESIUM CARB/ALUMINUM HYDROX 105-160MG
296 TABLET,CHEWABLE ORAL ONCE
Status: COMPLETED | OUTPATIENT
Start: 2025-07-20 | End: 2025-07-20

## 2025-07-20 RX ADMIN — HYDROCHLOROTHIAZIDE 25 MG: 25 TABLET ORAL at 08:24

## 2025-07-20 RX ADMIN — INSULIN LISPRO 2 UNITS: 100 INJECTION, SOLUTION INTRAVENOUS; SUBCUTANEOUS at 18:08

## 2025-07-20 RX ADMIN — PANTOPRAZOLE SODIUM 40 MG: 40 TABLET, DELAYED RELEASE ORAL at 16:29

## 2025-07-20 RX ADMIN — CARVEDILOL 25 MG: 25 TABLET, FILM COATED ORAL at 16:29

## 2025-07-20 RX ADMIN — INSULIN GLARGINE 20 UNITS: 100 INJECTION, SOLUTION SUBCUTANEOUS at 08:33

## 2025-07-20 RX ADMIN — BISACODYL 10 MG: 5 TABLET, COATED ORAL at 16:29

## 2025-07-20 RX ADMIN — POLYETHYLENE GLYCOL 3350 238 G: 17 POWDER, FOR SOLUTION ORAL at 18:05

## 2025-07-20 RX ADMIN — TAMSULOSIN HYDROCHLORIDE 0.4 MG: 0.4 CAPSULE ORAL at 16:29

## 2025-07-20 RX ADMIN — MAGNESIUM CITRATE 296 ML: 1.75 LIQUID ORAL at 22:24

## 2025-07-20 RX ADMIN — INSULIN LISPRO 6 UNITS: 100 INJECTION, SOLUTION INTRAVENOUS; SUBCUTANEOUS at 18:08

## 2025-07-20 RX ADMIN — CEFAZOLIN SODIUM 1000 MG: 1 SOLUTION INTRAVENOUS at 08:26

## 2025-07-20 RX ADMIN — HYDROCHLOROTHIAZIDE 25 MG: 25 TABLET ORAL at 18:28

## 2025-07-20 RX ADMIN — ACETAMINOPHEN 975 MG: 325 TABLET ORAL at 22:24

## 2025-07-20 RX ADMIN — AMITRIPTYLINE HYDROCHLORIDE 30 MG: 10 TABLET, FILM COATED ORAL at 22:25

## 2025-07-20 RX ADMIN — ACETAMINOPHEN 975 MG: 325 TABLET ORAL at 05:26

## 2025-07-20 RX ADMIN — CARVEDILOL 25 MG: 25 TABLET, FILM COATED ORAL at 08:24

## 2025-07-20 RX ADMIN — ACETAMINOPHEN 975 MG: 325 TABLET ORAL at 13:05

## 2025-07-20 RX ADMIN — HYDROMORPHONE HYDROCHLORIDE 0.2 MG: 0.2 INJECTION, SOLUTION INTRAMUSCULAR; INTRAVENOUS; SUBCUTANEOUS at 12:46

## 2025-07-20 RX ADMIN — PANTOPRAZOLE SODIUM 40 MG: 40 TABLET, DELAYED RELEASE ORAL at 08:25

## 2025-07-20 RX ADMIN — CEFAZOLIN SODIUM 1000 MG: 1 SOLUTION INTRAVENOUS at 18:14

## 2025-07-20 RX ADMIN — HYDROMORPHONE HYDROCHLORIDE 0.2 MG: 0.2 INJECTION, SOLUTION INTRAMUSCULAR; INTRAVENOUS; SUBCUTANEOUS at 18:28

## 2025-07-20 RX ADMIN — TRAMADOL HYDROCHLORIDE 100 MG: 50 TABLET, COATED ORAL at 22:25

## 2025-07-20 RX ADMIN — METOCLOPRAMIDE 10 MG: 5 INJECTION, SOLUTION INTRAMUSCULAR; INTRAVENOUS at 22:16

## 2025-07-20 RX ADMIN — CEFAZOLIN SODIUM 1000 MG: 1 SOLUTION INTRAVENOUS at 00:55

## 2025-07-20 RX ADMIN — MIRTAZAPINE 7.5 MG: 15 TABLET, FILM COATED ORAL at 22:25

## 2025-07-20 RX ADMIN — LIDOCAINE 1 PATCH: 700 PATCH TOPICAL at 08:25

## 2025-07-21 LAB
ANION GAP SERPL CALCULATED.3IONS-SCNC: 7 MMOL/L (ref 4–13)
BASOPHILS # BLD AUTO: 0.04 THOUSANDS/ÂΜL (ref 0–0.1)
BASOPHILS NFR BLD AUTO: 1 % (ref 0–1)
BUN SERPL-MCNC: 14 MG/DL (ref 5–25)
CALCIUM SERPL-MCNC: 8 MG/DL (ref 8.4–10.2)
CHLORIDE SERPL-SCNC: 105 MMOL/L (ref 96–108)
CO2 SERPL-SCNC: 27 MMOL/L (ref 21–32)
CREAT SERPL-MCNC: 0.99 MG/DL (ref 0.6–1.3)
EOSINOPHIL # BLD AUTO: 0.12 THOUSAND/ÂΜL (ref 0–0.61)
EOSINOPHIL NFR BLD AUTO: 3 % (ref 0–6)
ERYTHROCYTE [DISTWIDTH] IN BLOOD BY AUTOMATED COUNT: 13.6 % (ref 11.6–15.1)
GFR SERPL CREATININE-BSD FRML MDRD: 81 ML/MIN/1.73SQ M
GLUCOSE SERPL-MCNC: 221 MG/DL (ref 65–140)
GLUCOSE SERPL-MCNC: 229 MG/DL (ref 65–140)
GLUCOSE SERPL-MCNC: 85 MG/DL (ref 65–140)
GLUCOSE SERPL-MCNC: 91 MG/DL (ref 65–140)
HCT VFR BLD AUTO: 26 % (ref 36.5–49.3)
HGB BLD-MCNC: 8.1 G/DL (ref 12–17)
IMM GRANULOCYTES # BLD AUTO: 0.05 THOUSAND/UL (ref 0–0.2)
IMM GRANULOCYTES NFR BLD AUTO: 1 % (ref 0–2)
LYMPHOCYTES # BLD AUTO: 0.85 THOUSANDS/ÂΜL (ref 0.6–4.47)
LYMPHOCYTES NFR BLD AUTO: 21 % (ref 14–44)
MCH RBC QN AUTO: 29.1 PG (ref 26.8–34.3)
MCHC RBC AUTO-ENTMCNC: 31.2 G/DL (ref 31.4–37.4)
MCV RBC AUTO: 94 FL (ref 82–98)
MONOCYTES # BLD AUTO: 0.42 THOUSAND/ÂΜL (ref 0.17–1.22)
MONOCYTES NFR BLD AUTO: 11 % (ref 4–12)
NEUTROPHILS # BLD AUTO: 2.53 THOUSANDS/ÂΜL (ref 1.85–7.62)
NEUTS SEG NFR BLD AUTO: 63 % (ref 43–75)
NRBC BLD AUTO-RTO: 0 /100 WBCS
PLATELET # BLD AUTO: 238 THOUSANDS/UL (ref 149–390)
PMV BLD AUTO: 9.2 FL (ref 8.9–12.7)
POTASSIUM SERPL-SCNC: 4.1 MMOL/L (ref 3.5–5.3)
RBC # BLD AUTO: 2.78 MILLION/UL (ref 3.88–5.62)
SODIUM SERPL-SCNC: 139 MMOL/L (ref 135–147)
WBC # BLD AUTO: 4.01 THOUSAND/UL (ref 4.31–10.16)

## 2025-07-21 PROCEDURE — 82948 REAGENT STRIP/BLOOD GLUCOSE: CPT

## 2025-07-21 PROCEDURE — 80048 BASIC METABOLIC PNL TOTAL CA: CPT | Performed by: INTERNAL MEDICINE

## 2025-07-21 PROCEDURE — 99232 SBSQ HOSP IP/OBS MODERATE 35: CPT | Performed by: INTERNAL MEDICINE

## 2025-07-21 PROCEDURE — 99233 SBSQ HOSP IP/OBS HIGH 50: CPT | Performed by: INTERNAL MEDICINE

## 2025-07-21 PROCEDURE — 85025 COMPLETE CBC W/AUTO DIFF WBC: CPT | Performed by: INTERNAL MEDICINE

## 2025-07-21 PROCEDURE — G0545 PR INHERENT VISIT TO INPT: HCPCS | Performed by: INTERNAL MEDICINE

## 2025-07-21 RX ORDER — MAGNESIUM CARB/ALUMINUM HYDROX 105-160MG
296 TABLET,CHEWABLE ORAL 3 TIMES DAILY
Status: DISCONTINUED | OUTPATIENT
Start: 2025-07-21 | End: 2025-07-21

## 2025-07-21 RX ORDER — MAGNESIUM CARB/ALUMINUM HYDROX 105-160MG
296 TABLET,CHEWABLE ORAL 2 TIMES DAILY
Status: COMPLETED | OUTPATIENT
Start: 2025-07-22 | End: 2025-07-22

## 2025-07-21 RX ORDER — POLYETHYLENE GLYCOL 3350 17 G/17G
238 POWDER, FOR SOLUTION ORAL ONCE
Status: DISCONTINUED | OUTPATIENT
Start: 2025-07-21 | End: 2025-07-21

## 2025-07-21 RX ORDER — BISACODYL 5 MG/1
10 TABLET, DELAYED RELEASE ORAL ONCE
Status: COMPLETED | OUTPATIENT
Start: 2025-07-22 | End: 2025-07-22

## 2025-07-21 RX ORDER — BISACODYL 5 MG/1
10 TABLET, DELAYED RELEASE ORAL ONCE
Status: DISCONTINUED | OUTPATIENT
Start: 2025-07-21 | End: 2025-07-21

## 2025-07-21 RX ADMIN — ALLOPURINOL 50 MG: 100 TABLET ORAL at 08:56

## 2025-07-21 RX ADMIN — INSULIN LISPRO 6 UNITS: 100 INJECTION, SOLUTION INTRAVENOUS; SUBCUTANEOUS at 17:26

## 2025-07-21 RX ADMIN — MIRTAZAPINE 7.5 MG: 15 TABLET, FILM COATED ORAL at 22:25

## 2025-07-21 RX ADMIN — INSULIN LISPRO 2 UNITS: 100 INJECTION, SOLUTION INTRAVENOUS; SUBCUTANEOUS at 22:25

## 2025-07-21 RX ADMIN — HYDROCHLOROTHIAZIDE 25 MG: 25 TABLET ORAL at 17:24

## 2025-07-21 RX ADMIN — METHOCARBAMOL 750 MG: 750 TABLET ORAL at 09:12

## 2025-07-21 RX ADMIN — AMITRIPTYLINE HYDROCHLORIDE 30 MG: 10 TABLET, FILM COATED ORAL at 22:24

## 2025-07-21 RX ADMIN — PANTOPRAZOLE SODIUM 40 MG: 40 TABLET, DELAYED RELEASE ORAL at 17:24

## 2025-07-21 RX ADMIN — INSULIN GLARGINE 20 UNITS: 100 INJECTION, SOLUTION SUBCUTANEOUS at 22:25

## 2025-07-21 RX ADMIN — CEFAZOLIN SODIUM 1000 MG: 1 SOLUTION INTRAVENOUS at 01:40

## 2025-07-21 RX ADMIN — CEFAZOLIN SODIUM 1000 MG: 1 SOLUTION INTRAVENOUS at 08:57

## 2025-07-21 RX ADMIN — CARVEDILOL 25 MG: 25 TABLET, FILM COATED ORAL at 08:56

## 2025-07-21 RX ADMIN — METHOCARBAMOL 750 MG: 750 TABLET ORAL at 18:20

## 2025-07-21 RX ADMIN — ACETAMINOPHEN 975 MG: 325 TABLET ORAL at 13:54

## 2025-07-21 RX ADMIN — TRAMADOL HYDROCHLORIDE 100 MG: 50 TABLET, COATED ORAL at 22:24

## 2025-07-21 RX ADMIN — ACETAMINOPHEN 975 MG: 325 TABLET ORAL at 05:16

## 2025-07-21 RX ADMIN — PANTOPRAZOLE SODIUM 40 MG: 40 TABLET, DELAYED RELEASE ORAL at 05:16

## 2025-07-21 RX ADMIN — CARVEDILOL 25 MG: 25 TABLET, FILM COATED ORAL at 17:24

## 2025-07-21 RX ADMIN — HYDROCHLOROTHIAZIDE 25 MG: 25 TABLET ORAL at 09:04

## 2025-07-21 RX ADMIN — CEFAZOLIN SODIUM 1000 MG: 1 SOLUTION INTRAVENOUS at 17:24

## 2025-07-21 RX ADMIN — ACETAMINOPHEN 975 MG: 325 TABLET ORAL at 22:25

## 2025-07-21 RX ADMIN — INSULIN LISPRO 4 UNITS: 100 INJECTION, SOLUTION INTRAVENOUS; SUBCUTANEOUS at 17:25

## 2025-07-21 RX ADMIN — TAMSULOSIN HYDROCHLORIDE 0.4 MG: 0.4 CAPSULE ORAL at 17:24

## 2025-07-21 NOTE — TELEPHONE ENCOUNTER
Patient diagnosed with esophogeal cancer. Blood cultures positive.  Scheduled for colonoscopy today.  Continue to follow.

## 2025-07-22 LAB
ANION GAP SERPL CALCULATED.3IONS-SCNC: 4 MMOL/L (ref 4–13)
BASOPHILS # BLD AUTO: 0.05 THOUSANDS/ÂΜL (ref 0–0.1)
BASOPHILS NFR BLD AUTO: 1 % (ref 0–1)
BUN SERPL-MCNC: 12 MG/DL (ref 5–25)
CALCIUM SERPL-MCNC: 7.7 MG/DL (ref 8.4–10.2)
CHLORIDE SERPL-SCNC: 104 MMOL/L (ref 96–108)
CO2 SERPL-SCNC: 29 MMOL/L (ref 21–32)
CREAT SERPL-MCNC: 1.06 MG/DL (ref 0.6–1.3)
EOSINOPHIL # BLD AUTO: 0.11 THOUSAND/ÂΜL (ref 0–0.61)
EOSINOPHIL NFR BLD AUTO: 3 % (ref 0–6)
ERYTHROCYTE [DISTWIDTH] IN BLOOD BY AUTOMATED COUNT: 13.7 % (ref 11.6–15.1)
GFR SERPL CREATININE-BSD FRML MDRD: 74 ML/MIN/1.73SQ M
GLUCOSE SERPL-MCNC: 178 MG/DL (ref 65–140)
GLUCOSE SERPL-MCNC: 204 MG/DL (ref 65–140)
GLUCOSE SERPL-MCNC: 209 MG/DL (ref 65–140)
GLUCOSE SERPL-MCNC: 242 MG/DL (ref 65–140)
GLUCOSE SERPL-MCNC: 95 MG/DL (ref 65–140)
HCT VFR BLD AUTO: 26.8 % (ref 36.5–49.3)
HGB BLD-MCNC: 8.6 G/DL (ref 12–17)
IMM GRANULOCYTES # BLD AUTO: 0.03 THOUSAND/UL (ref 0–0.2)
IMM GRANULOCYTES NFR BLD AUTO: 1 % (ref 0–2)
LYMPHOCYTES # BLD AUTO: 0.98 THOUSANDS/ÂΜL (ref 0.6–4.47)
LYMPHOCYTES NFR BLD AUTO: 26 % (ref 14–44)
MCH RBC QN AUTO: 29.8 PG (ref 26.8–34.3)
MCHC RBC AUTO-ENTMCNC: 32.1 G/DL (ref 31.4–37.4)
MCV RBC AUTO: 93 FL (ref 82–98)
MONOCYTES # BLD AUTO: 0.46 THOUSAND/ÂΜL (ref 0.17–1.22)
MONOCYTES NFR BLD AUTO: 12 % (ref 4–12)
NEUTROPHILS # BLD AUTO: 2.2 THOUSANDS/ÂΜL (ref 1.85–7.62)
NEUTS SEG NFR BLD AUTO: 57 % (ref 43–75)
NRBC BLD AUTO-RTO: 0 /100 WBCS
PLATELET # BLD AUTO: 269 THOUSANDS/UL (ref 149–390)
PMV BLD AUTO: 9.4 FL (ref 8.9–12.7)
POTASSIUM SERPL-SCNC: 4.2 MMOL/L (ref 3.5–5.3)
RBC # BLD AUTO: 2.89 MILLION/UL (ref 3.88–5.62)
SODIUM SERPL-SCNC: 137 MMOL/L (ref 135–147)
WBC # BLD AUTO: 3.83 THOUSAND/UL (ref 4.31–10.16)

## 2025-07-22 PROCEDURE — 99233 SBSQ HOSP IP/OBS HIGH 50: CPT | Performed by: INTERNAL MEDICINE

## 2025-07-22 PROCEDURE — G0545 PR INHERENT VISIT TO INPT: HCPCS | Performed by: INTERNAL MEDICINE

## 2025-07-22 PROCEDURE — 82948 REAGENT STRIP/BLOOD GLUCOSE: CPT

## 2025-07-22 PROCEDURE — 99232 SBSQ HOSP IP/OBS MODERATE 35: CPT | Performed by: INTERNAL MEDICINE

## 2025-07-22 PROCEDURE — 80048 BASIC METABOLIC PNL TOTAL CA: CPT | Performed by: INTERNAL MEDICINE

## 2025-07-22 PROCEDURE — 85025 COMPLETE CBC W/AUTO DIFF WBC: CPT | Performed by: INTERNAL MEDICINE

## 2025-07-22 RX ORDER — MAGNESIUM CARB/ALUMINUM HYDROX 105-160MG
296 TABLET,CHEWABLE ORAL ONCE
Status: COMPLETED | OUTPATIENT
Start: 2025-07-22 | End: 2025-07-22

## 2025-07-22 RX ORDER — MAGNESIUM CARB/ALUMINUM HYDROX 105-160MG
296 TABLET,CHEWABLE ORAL ONCE
Status: DISCONTINUED | OUTPATIENT
Start: 2025-07-23 | End: 2025-07-22

## 2025-07-22 RX ORDER — MAGNESIUM CARB/ALUMINUM HYDROX 105-160MG
296 TABLET,CHEWABLE ORAL ONCE
Status: DISCONTINUED | OUTPATIENT
Start: 2025-07-23 | End: 2025-08-01

## 2025-07-22 RX ORDER — BISACODYL 5 MG/1
10 TABLET, DELAYED RELEASE ORAL ONCE
Status: COMPLETED | OUTPATIENT
Start: 2025-07-22 | End: 2025-07-22

## 2025-07-22 RX ADMIN — CARVEDILOL 25 MG: 25 TABLET, FILM COATED ORAL at 17:42

## 2025-07-22 RX ADMIN — CARVEDILOL 25 MG: 25 TABLET, FILM COATED ORAL at 09:27

## 2025-07-22 RX ADMIN — INSULIN GLARGINE 20 UNITS: 100 INJECTION, SOLUTION SUBCUTANEOUS at 10:21

## 2025-07-22 RX ADMIN — CEFAZOLIN SODIUM 1000 MG: 1 SOLUTION INTRAVENOUS at 17:43

## 2025-07-22 RX ADMIN — ACETAMINOPHEN 975 MG: 325 TABLET ORAL at 05:46

## 2025-07-22 RX ADMIN — PANTOPRAZOLE SODIUM 40 MG: 40 TABLET, DELAYED RELEASE ORAL at 05:46

## 2025-07-22 RX ADMIN — MAGNESIUM CITRATE 296 ML: 1.75 LIQUID ORAL at 12:57

## 2025-07-22 RX ADMIN — BISACODYL 10 MG: 5 TABLET, COATED ORAL at 12:54

## 2025-07-22 RX ADMIN — TAMSULOSIN HYDROCHLORIDE 0.4 MG: 0.4 CAPSULE ORAL at 17:42

## 2025-07-22 RX ADMIN — INSULIN LISPRO 4 UNITS: 100 INJECTION, SOLUTION INTRAVENOUS; SUBCUTANEOUS at 09:49

## 2025-07-22 RX ADMIN — ACETAMINOPHEN 975 MG: 325 TABLET ORAL at 13:00

## 2025-07-22 RX ADMIN — INSULIN LISPRO 6 UNITS: 100 INJECTION, SOLUTION INTRAVENOUS; SUBCUTANEOUS at 12:53

## 2025-07-22 RX ADMIN — INSULIN LISPRO 1 UNITS: 100 INJECTION, SOLUTION INTRAVENOUS; SUBCUTANEOUS at 21:50

## 2025-07-22 RX ADMIN — MIRTAZAPINE 7.5 MG: 15 TABLET, FILM COATED ORAL at 21:50

## 2025-07-22 RX ADMIN — BISACODYL 10 MG: 5 TABLET, COATED ORAL at 17:42

## 2025-07-22 RX ADMIN — MAGNESIUM CITRATE 296 ML: 1.75 LIQUID ORAL at 10:00

## 2025-07-22 RX ADMIN — MAGNESIUM CITRATE 296 ML: 1.75 LIQUID ORAL at 17:42

## 2025-07-22 RX ADMIN — PANTOPRAZOLE SODIUM 40 MG: 40 TABLET, DELAYED RELEASE ORAL at 17:42

## 2025-07-22 RX ADMIN — HYDROMORPHONE HYDROCHLORIDE 0.2 MG: 0.2 INJECTION, SOLUTION INTRAMUSCULAR; INTRAVENOUS; SUBCUTANEOUS at 02:33

## 2025-07-22 RX ADMIN — AMITRIPTYLINE HYDROCHLORIDE 30 MG: 10 TABLET, FILM COATED ORAL at 21:50

## 2025-07-22 RX ADMIN — HYDROCHLOROTHIAZIDE 25 MG: 25 TABLET ORAL at 17:42

## 2025-07-22 RX ADMIN — HYDROCHLOROTHIAZIDE 25 MG: 25 TABLET ORAL at 09:27

## 2025-07-22 RX ADMIN — INSULIN LISPRO 2 UNITS: 100 INJECTION, SOLUTION INTRAVENOUS; SUBCUTANEOUS at 12:53

## 2025-07-22 RX ADMIN — INSULIN LISPRO 6 UNITS: 100 INJECTION, SOLUTION INTRAVENOUS; SUBCUTANEOUS at 09:50

## 2025-07-22 RX ADMIN — TRAMADOL HYDROCHLORIDE 100 MG: 50 TABLET, COATED ORAL at 21:49

## 2025-07-22 RX ADMIN — METHOCARBAMOL 750 MG: 750 TABLET ORAL at 12:54

## 2025-07-22 RX ADMIN — CEFAZOLIN SODIUM 1000 MG: 1 SOLUTION INTRAVENOUS at 09:27

## 2025-07-22 RX ADMIN — CEFAZOLIN SODIUM 1000 MG: 1 SOLUTION INTRAVENOUS at 01:38

## 2025-07-22 RX ADMIN — INSULIN GLARGINE 20 UNITS: 100 INJECTION, SOLUTION SUBCUTANEOUS at 21:49

## 2025-07-22 RX ADMIN — ACETAMINOPHEN 975 MG: 325 TABLET ORAL at 21:49

## 2025-07-23 ENCOUNTER — ANESTHESIA (INPATIENT)
Dept: GASTROENTEROLOGY | Facility: HOSPITAL | Age: 60
DRG: 871 | End: 2025-07-23
Payer: MEDICARE

## 2025-07-23 ENCOUNTER — ANESTHESIA EVENT (INPATIENT)
Dept: GASTROENTEROLOGY | Facility: HOSPITAL | Age: 60
DRG: 871 | End: 2025-07-23
Payer: MEDICARE

## 2025-07-23 ENCOUNTER — APPOINTMENT (INPATIENT)
Dept: GASTROENTEROLOGY | Facility: HOSPITAL | Age: 60
DRG: 682 | End: 2025-07-23
Payer: MEDICARE

## 2025-07-23 LAB
ANION GAP SERPL CALCULATED.3IONS-SCNC: 6 MMOL/L (ref 4–13)
BASOPHILS # BLD AUTO: 0.05 THOUSANDS/ÂΜL (ref 0–0.1)
BASOPHILS NFR BLD AUTO: 2 % (ref 0–1)
BUN SERPL-MCNC: 10 MG/DL (ref 5–25)
CALCIUM SERPL-MCNC: 8.1 MG/DL (ref 8.4–10.2)
CHLORIDE SERPL-SCNC: 104 MMOL/L (ref 96–108)
CO2 SERPL-SCNC: 27 MMOL/L (ref 21–32)
CREAT SERPL-MCNC: 0.9 MG/DL (ref 0.6–1.3)
EOSINOPHIL # BLD AUTO: 0.09 THOUSAND/ÂΜL (ref 0–0.61)
EOSINOPHIL NFR BLD AUTO: 3 % (ref 0–6)
ERYTHROCYTE [DISTWIDTH] IN BLOOD BY AUTOMATED COUNT: 13.8 % (ref 11.6–15.1)
GFR SERPL CREATININE-BSD FRML MDRD: 91 ML/MIN/1.73SQ M
GLUCOSE SERPL-MCNC: 105 MG/DL (ref 65–140)
GLUCOSE SERPL-MCNC: 113 MG/DL (ref 65–140)
GLUCOSE SERPL-MCNC: 197 MG/DL (ref 65–140)
GLUCOSE SERPL-MCNC: 220 MG/DL (ref 65–140)
GLUCOSE SERPL-MCNC: 93 MG/DL (ref 65–140)
GLUCOSE SERPL-MCNC: 97 MG/DL (ref 65–140)
HCT VFR BLD AUTO: 27.3 % (ref 36.5–49.3)
HGB BLD-MCNC: 8.4 G/DL (ref 12–17)
IMM GRANULOCYTES # BLD AUTO: 0.01 THOUSAND/UL (ref 0–0.2)
IMM GRANULOCYTES NFR BLD AUTO: 0 % (ref 0–2)
LYMPHOCYTES # BLD AUTO: 0.79 THOUSANDS/ÂΜL (ref 0.6–4.47)
LYMPHOCYTES NFR BLD AUTO: 25 % (ref 14–44)
MCH RBC QN AUTO: 29.1 PG (ref 26.8–34.3)
MCHC RBC AUTO-ENTMCNC: 30.8 G/DL (ref 31.4–37.4)
MCV RBC AUTO: 95 FL (ref 82–98)
MONOCYTES # BLD AUTO: 0.4 THOUSAND/ÂΜL (ref 0.17–1.22)
MONOCYTES NFR BLD AUTO: 12 % (ref 4–12)
NEUTROPHILS # BLD AUTO: 1.88 THOUSANDS/ÂΜL (ref 1.85–7.62)
NEUTS SEG NFR BLD AUTO: 58 % (ref 43–75)
NRBC BLD AUTO-RTO: 0 /100 WBCS
PLATELET # BLD AUTO: 236 THOUSANDS/UL (ref 149–390)
PMV BLD AUTO: 9.1 FL (ref 8.9–12.7)
POTASSIUM SERPL-SCNC: 4 MMOL/L (ref 3.5–5.3)
RBC # BLD AUTO: 2.89 MILLION/UL (ref 3.88–5.62)
SODIUM SERPL-SCNC: 137 MMOL/L (ref 135–147)
WBC # BLD AUTO: 3.22 THOUSAND/UL (ref 4.31–10.16)

## 2025-07-23 PROCEDURE — 45378 DIAGNOSTIC COLONOSCOPY: CPT | Performed by: INTERNAL MEDICINE

## 2025-07-23 PROCEDURE — 99233 SBSQ HOSP IP/OBS HIGH 50: CPT | Performed by: INTERNAL MEDICINE

## 2025-07-23 PROCEDURE — 99232 SBSQ HOSP IP/OBS MODERATE 35: CPT | Performed by: INTERNAL MEDICINE

## 2025-07-23 PROCEDURE — G0545 PR INHERENT VISIT TO INPT: HCPCS | Performed by: INTERNAL MEDICINE

## 2025-07-23 PROCEDURE — 0DJD8ZZ INSPECTION OF LOWER INTESTINAL TRACT, VIA NATURAL OR ARTIFICIAL OPENING ENDOSCOPIC: ICD-10-PCS | Performed by: INTERNAL MEDICINE

## 2025-07-23 PROCEDURE — NC001 PR NO CHARGE: Performed by: INTERNAL MEDICINE

## 2025-07-23 PROCEDURE — 80048 BASIC METABOLIC PNL TOTAL CA: CPT | Performed by: INTERNAL MEDICINE

## 2025-07-23 PROCEDURE — 85025 COMPLETE CBC W/AUTO DIFF WBC: CPT | Performed by: INTERNAL MEDICINE

## 2025-07-23 PROCEDURE — 82948 REAGENT STRIP/BLOOD GLUCOSE: CPT

## 2025-07-23 RX ORDER — SODIUM CHLORIDE, SODIUM LACTATE, POTASSIUM CHLORIDE, CALCIUM CHLORIDE 600; 310; 30; 20 MG/100ML; MG/100ML; MG/100ML; MG/100ML
125 INJECTION, SOLUTION INTRAVENOUS CONTINUOUS
Status: DISCONTINUED | OUTPATIENT
Start: 2025-07-23 | End: 2025-07-24

## 2025-07-23 RX ORDER — PROPOFOL 10 MG/ML
INJECTION, EMULSION INTRAVENOUS AS NEEDED
Status: DISCONTINUED | OUTPATIENT
Start: 2025-07-23 | End: 2025-07-23

## 2025-07-23 RX ORDER — SODIUM CHLORIDE, SODIUM LACTATE, POTASSIUM CHLORIDE, CALCIUM CHLORIDE 600; 310; 30; 20 MG/100ML; MG/100ML; MG/100ML; MG/100ML
INJECTION, SOLUTION INTRAVENOUS CONTINUOUS PRN
Status: DISCONTINUED | OUTPATIENT
Start: 2025-07-23 | End: 2025-07-23

## 2025-07-23 RX ADMIN — MIRTAZAPINE 7.5 MG: 15 TABLET, FILM COATED ORAL at 21:45

## 2025-07-23 RX ADMIN — INSULIN GLARGINE 20 UNITS: 100 INJECTION, SOLUTION SUBCUTANEOUS at 21:44

## 2025-07-23 RX ADMIN — PANTOPRAZOLE SODIUM 40 MG: 40 TABLET, DELAYED RELEASE ORAL at 17:20

## 2025-07-23 RX ADMIN — HYDROCHLOROTHIAZIDE 25 MG: 25 TABLET ORAL at 17:20

## 2025-07-23 RX ADMIN — TRAMADOL HYDROCHLORIDE 100 MG: 50 TABLET, COATED ORAL at 21:45

## 2025-07-23 RX ADMIN — INSULIN LISPRO 1 UNITS: 100 INJECTION, SOLUTION INTRAVENOUS; SUBCUTANEOUS at 21:46

## 2025-07-23 RX ADMIN — CEFAZOLIN SODIUM 1000 MG: 1 SOLUTION INTRAVENOUS at 10:22

## 2025-07-23 RX ADMIN — INSULIN LISPRO 6 UNITS: 100 INJECTION, SOLUTION INTRAVENOUS; SUBCUTANEOUS at 17:22

## 2025-07-23 RX ADMIN — CARVEDILOL 25 MG: 25 TABLET, FILM COATED ORAL at 17:20

## 2025-07-23 RX ADMIN — PROPOFOL 50 MG: 10 INJECTION, EMULSION INTRAVENOUS at 13:14

## 2025-07-23 RX ADMIN — CARVEDILOL 25 MG: 25 TABLET, FILM COATED ORAL at 10:22

## 2025-07-23 RX ADMIN — INSULIN LISPRO 4 UNITS: 100 INJECTION, SOLUTION INTRAVENOUS; SUBCUTANEOUS at 17:21

## 2025-07-23 RX ADMIN — CEFAZOLIN SODIUM 1000 MG: 1 SOLUTION INTRAVENOUS at 17:21

## 2025-07-23 RX ADMIN — TAMSULOSIN HYDROCHLORIDE 0.4 MG: 0.4 CAPSULE ORAL at 17:20

## 2025-07-23 RX ADMIN — PROPOFOL 100 MG: 10 INJECTION, EMULSION INTRAVENOUS at 13:06

## 2025-07-23 RX ADMIN — CEFAZOLIN SODIUM 1000 MG: 1 SOLUTION INTRAVENOUS at 01:39

## 2025-07-23 RX ADMIN — PROPOFOL 50 MG: 10 INJECTION, EMULSION INTRAVENOUS at 13:09

## 2025-07-23 RX ADMIN — HYDROCHLOROTHIAZIDE 25 MG: 25 TABLET ORAL at 10:22

## 2025-07-23 RX ADMIN — PROPOFOL 50 MG: 10 INJECTION, EMULSION INTRAVENOUS at 13:21

## 2025-07-23 RX ADMIN — AMITRIPTYLINE HYDROCHLORIDE 30 MG: 10 TABLET, FILM COATED ORAL at 21:45

## 2025-07-23 RX ADMIN — ACETAMINOPHEN 975 MG: 325 TABLET ORAL at 21:45

## 2025-07-23 RX ADMIN — HYDROMORPHONE HYDROCHLORIDE 0.2 MG: 0.2 INJECTION, SOLUTION INTRAMUSCULAR; INTRAVENOUS; SUBCUTANEOUS at 17:51

## 2025-07-23 RX ADMIN — SODIUM CHLORIDE, SODIUM LACTATE, POTASSIUM CHLORIDE, AND CALCIUM CHLORIDE: .6; .31; .03; .02 INJECTION, SOLUTION INTRAVENOUS at 13:03

## 2025-07-23 NOTE — ANESTHESIA POSTPROCEDURE EVALUATION
Post-Op Assessment Note    CV Status:  Stable  Pain Score: 0    Pain management: adequate       Mental Status:  Alert and awake   Hydration Status:  Euvolemic and stable   PONV Controlled:  Controlled   Airway Patency:  Patent and adequate     Post Op Vitals Reviewed: Yes    No anethesia notable event occurred.          Last Filed PACU Vitals:  Vitals Value Taken Time   Temp 96.2 °F (35.7 °C) 07/23/25 13:32   Pulse 83 07/23/25 13:32   /96 07/23/25 13:32   Resp 18 07/23/25 13:32   SpO2 96 % 07/23/25 13:32       Modified Rudy:     Vitals Value Taken Time   Activity 2 07/23/25 13:33   Respiration 2 07/23/25 13:33   Circulation 2 07/23/25 13:33   Consciousness 1 07/23/25 13:33   Oxygen Saturation 2 07/23/25 13:33     Modified Rudy Score: 9

## 2025-07-23 NOTE — ANESTHESIA PREPROCEDURE EVALUATION
Procedure:  COLONOSCOPY    Relevant Problems   ANESTHESIA (within normal limits)      CARDIO   (+) Deep vein thrombosis (DVT) of femoral vein of left lower extremity (HCC)   (+) Essential hypertension      ENDO   (+) Type 2 diabetes mellitus with chronic kidney disease, without long-term current use of insulin (HCC)   (+) Type 2 diabetes mellitus with hyperglycemia, without long-term current use of insulin (HCC)      GI/HEPATIC   (+) Malignant neoplasm of lower third of esophagus (HCC)   (+) Other dysphagia      /RENAL   (+) VIVIANA (acute kidney injury) (HCC)   (+) Acute kidney injury superimposed on stage 3a chronic kidney disease (HCC)   (+) Nephrolithiasis   (+) Stage 3b chronic kidney disease (HCC)      GYN (within normal limits)      HEMATOLOGY   (+) Anemia   (+) Pancytopenia (HCC)      MUSCULOSKELETAL   (+) Adhesive capsulitis of right shoulder associated with type 2 diabetes mellitus  (HCC)   (+) Low back pain with sciatica      NEURO/PSYCH (within normal limits)      PULMONARY   (+) JEWEL (obstructive sleep apnea)        Physical Exam    Airway     Mallampati score: III  TM Distance: >3 FB  Neck ROM: full  Mouth opening: >= 4 cm      Cardiovascular  Cardiovascular exam normal    Dental   No notable dental hx edentulous    Pulmonary  Pulmonary exam normal     Neurological  - normal exam    Other Findings        Anesthesia Plan  ASA Score- 3     Anesthesia Type- IV sedation with anesthesia with ASA Monitors.         Additional Monitors:     Airway Plan: natural airway.    Comment: Admitted for septic shock, since resolved and off pressors. Concern for GI source, EF 55%, no aortic stenosis .       Plan Factors-Exercise tolerance (METS): <4 METS.    Chart reviewed. EKG reviewed. Imaging results reviewed. Existing labs reviewed. Patient summary reviewed.    Patient is not a current smoker.      Obstructive sleep apnea risk education given perioperatively.        Induction- intravenous.    Postoperative Plan- .    Monitoring Plan - Monitoring plan - standard ASA monitoring  Post Operative Pain Plan - non-opiod analgesics    Perioperative Resuscitation Plan - Level 1 - Full Code.       Informed Consent- Anesthetic plan and risks discussed with patient.  I personally reviewed this patient with the CRNA. Discussed and agreed on the Anesthesia Plan with the CRNA..      NPO Status:  Vitals Value Taken Time   Date of last liquid 07/11/25 07/11/25 14:17   Time of last liquid 1000 07/11/25 14:17   Date of last solid 07/10/25 07/11/25 14:17   Time of last solid 1500 07/11/25 14:17

## 2025-07-24 LAB
ANION GAP SERPL CALCULATED.3IONS-SCNC: 4 MMOL/L (ref 4–13)
BASOPHILS # BLD AUTO: 0.04 THOUSANDS/ÂΜL (ref 0–0.1)
BASOPHILS NFR BLD AUTO: 1 % (ref 0–1)
BUN SERPL-MCNC: 10 MG/DL (ref 5–25)
CALCIUM SERPL-MCNC: 7.9 MG/DL (ref 8.4–10.2)
CHLORIDE SERPL-SCNC: 104 MMOL/L (ref 96–108)
CO2 SERPL-SCNC: 28 MMOL/L (ref 21–32)
CREAT SERPL-MCNC: 1.02 MG/DL (ref 0.6–1.3)
EOSINOPHIL # BLD AUTO: 0.09 THOUSAND/ÂΜL (ref 0–0.61)
EOSINOPHIL NFR BLD AUTO: 3 % (ref 0–6)
ERYTHROCYTE [DISTWIDTH] IN BLOOD BY AUTOMATED COUNT: 13.6 % (ref 11.6–15.1)
GFR SERPL CREATININE-BSD FRML MDRD: 78 ML/MIN/1.73SQ M
GLUCOSE SERPL-MCNC: 168 MG/DL (ref 65–140)
GLUCOSE SERPL-MCNC: 194 MG/DL (ref 65–140)
GLUCOSE SERPL-MCNC: 195 MG/DL (ref 65–140)
GLUCOSE SERPL-MCNC: 211 MG/DL (ref 65–140)
GLUCOSE SERPL-MCNC: 96 MG/DL (ref 65–140)
HCT VFR BLD AUTO: 27.5 % (ref 36.5–49.3)
HGB BLD-MCNC: 8.4 G/DL (ref 12–17)
IMM GRANULOCYTES # BLD AUTO: 0.01 THOUSAND/UL (ref 0–0.2)
IMM GRANULOCYTES NFR BLD AUTO: 0 % (ref 0–2)
LYMPHOCYTES # BLD AUTO: 0.92 THOUSANDS/ÂΜL (ref 0.6–4.47)
LYMPHOCYTES NFR BLD AUTO: 29 % (ref 14–44)
MCH RBC QN AUTO: 29.4 PG (ref 26.8–34.3)
MCHC RBC AUTO-ENTMCNC: 30.5 G/DL (ref 31.4–37.4)
MCV RBC AUTO: 96 FL (ref 82–98)
MONOCYTES # BLD AUTO: 0.46 THOUSAND/ÂΜL (ref 0.17–1.22)
MONOCYTES NFR BLD AUTO: 14 % (ref 4–12)
NEUTROPHILS # BLD AUTO: 1.68 THOUSANDS/ÂΜL (ref 1.85–7.62)
NEUTS SEG NFR BLD AUTO: 53 % (ref 43–75)
NRBC BLD AUTO-RTO: 0 /100 WBCS
PLATELET # BLD AUTO: 227 THOUSANDS/UL (ref 149–390)
PMV BLD AUTO: 9.1 FL (ref 8.9–12.7)
POTASSIUM SERPL-SCNC: 4.3 MMOL/L (ref 3.5–5.3)
RBC # BLD AUTO: 2.86 MILLION/UL (ref 3.88–5.62)
SODIUM SERPL-SCNC: 136 MMOL/L (ref 135–147)
WBC # BLD AUTO: 3.2 THOUSAND/UL (ref 4.31–10.16)

## 2025-07-24 PROCEDURE — 99233 SBSQ HOSP IP/OBS HIGH 50: CPT | Performed by: INTERNAL MEDICINE

## 2025-07-24 PROCEDURE — 99232 SBSQ HOSP IP/OBS MODERATE 35: CPT | Performed by: INTERNAL MEDICINE

## 2025-07-24 PROCEDURE — 85025 COMPLETE CBC W/AUTO DIFF WBC: CPT | Performed by: INTERNAL MEDICINE

## 2025-07-24 PROCEDURE — 82948 REAGENT STRIP/BLOOD GLUCOSE: CPT

## 2025-07-24 PROCEDURE — 87040 BLOOD CULTURE FOR BACTERIA: CPT | Performed by: INTERNAL MEDICINE

## 2025-07-24 PROCEDURE — 80048 BASIC METABOLIC PNL TOTAL CA: CPT | Performed by: INTERNAL MEDICINE

## 2025-07-24 PROCEDURE — G0545 PR INHERENT VISIT TO INPT: HCPCS | Performed by: INTERNAL MEDICINE

## 2025-07-24 RX ORDER — FUROSEMIDE 10 MG/ML
20 INJECTION INTRAMUSCULAR; INTRAVENOUS
Status: COMPLETED | OUTPATIENT
Start: 2025-07-24 | End: 2025-07-25

## 2025-07-24 RX ADMIN — CARVEDILOL 25 MG: 25 TABLET, FILM COATED ORAL at 08:56

## 2025-07-24 RX ADMIN — FUROSEMIDE 20 MG: 10 INJECTION, SOLUTION INTRAMUSCULAR; INTRAVENOUS at 16:27

## 2025-07-24 RX ADMIN — CEFAZOLIN SODIUM 1000 MG: 1 SOLUTION INTRAVENOUS at 01:14

## 2025-07-24 RX ADMIN — INSULIN LISPRO 6 UNITS: 100 INJECTION, SOLUTION INTRAVENOUS; SUBCUTANEOUS at 09:01

## 2025-07-24 RX ADMIN — INSULIN LISPRO 6 UNITS: 100 INJECTION, SOLUTION INTRAVENOUS; SUBCUTANEOUS at 18:23

## 2025-07-24 RX ADMIN — PANTOPRAZOLE SODIUM 40 MG: 40 TABLET, DELAYED RELEASE ORAL at 16:27

## 2025-07-24 RX ADMIN — TAMSULOSIN HYDROCHLORIDE 0.4 MG: 0.4 CAPSULE ORAL at 17:19

## 2025-07-24 RX ADMIN — TRAMADOL HYDROCHLORIDE 100 MG: 50 TABLET, COATED ORAL at 21:33

## 2025-07-24 RX ADMIN — PANTOPRAZOLE SODIUM 40 MG: 40 TABLET, DELAYED RELEASE ORAL at 05:26

## 2025-07-24 RX ADMIN — INSULIN LISPRO 1 UNITS: 100 INJECTION, SOLUTION INTRAVENOUS; SUBCUTANEOUS at 21:39

## 2025-07-24 RX ADMIN — AMITRIPTYLINE HYDROCHLORIDE 30 MG: 10 TABLET, FILM COATED ORAL at 21:33

## 2025-07-24 RX ADMIN — HYDROMORPHONE HYDROCHLORIDE 0.2 MG: 0.2 INJECTION, SOLUTION INTRAMUSCULAR; INTRAVENOUS; SUBCUTANEOUS at 11:27

## 2025-07-24 RX ADMIN — ACETAMINOPHEN 975 MG: 325 TABLET ORAL at 21:33

## 2025-07-24 RX ADMIN — INSULIN LISPRO 2 UNITS: 100 INJECTION, SOLUTION INTRAVENOUS; SUBCUTANEOUS at 16:34

## 2025-07-24 RX ADMIN — MIRTAZAPINE 7.5 MG: 15 TABLET, FILM COATED ORAL at 21:33

## 2025-07-24 RX ADMIN — LIDOCAINE 1 PATCH: 700 PATCH TOPICAL at 08:57

## 2025-07-24 RX ADMIN — HYDROCHLOROTHIAZIDE 25 MG: 25 TABLET ORAL at 17:19

## 2025-07-24 RX ADMIN — INSULIN LISPRO 4 UNITS: 100 INJECTION, SOLUTION INTRAVENOUS; SUBCUTANEOUS at 09:00

## 2025-07-24 RX ADMIN — INSULIN GLARGINE 20 UNITS: 100 INJECTION, SOLUTION SUBCUTANEOUS at 21:39

## 2025-07-24 RX ADMIN — CARVEDILOL 25 MG: 25 TABLET, FILM COATED ORAL at 17:19

## 2025-07-24 RX ADMIN — RIVAROXABAN 20 MG: 20 TABLET, FILM COATED ORAL at 17:19

## 2025-07-24 RX ADMIN — ACETAMINOPHEN 975 MG: 325 TABLET ORAL at 13:36

## 2025-07-24 RX ADMIN — ALLOPURINOL 50 MG: 100 TABLET ORAL at 08:56

## 2025-07-24 RX ADMIN — CEFAZOLIN SODIUM 1000 MG: 1 SOLUTION INTRAVENOUS at 08:56

## 2025-07-24 RX ADMIN — INSULIN GLARGINE 20 UNITS: 100 INJECTION, SOLUTION SUBCUTANEOUS at 08:50

## 2025-07-24 RX ADMIN — ACETAMINOPHEN 975 MG: 325 TABLET ORAL at 05:26

## 2025-07-24 RX ADMIN — CEFAZOLIN SODIUM 1000 MG: 1 SOLUTION INTRAVENOUS at 16:27

## 2025-07-24 RX ADMIN — INSULIN LISPRO 6 UNITS: 100 INJECTION, SOLUTION INTRAVENOUS; SUBCUTANEOUS at 12:28

## 2025-07-24 RX ADMIN — HYDROCHLOROTHIAZIDE 25 MG: 25 TABLET ORAL at 08:56

## 2025-07-25 LAB
ANION GAP SERPL CALCULATED.3IONS-SCNC: 4 MMOL/L (ref 4–13)
BASOPHILS # BLD AUTO: 0.05 THOUSANDS/ÂΜL (ref 0–0.1)
BASOPHILS NFR BLD AUTO: 2 % (ref 0–1)
BUN SERPL-MCNC: 11 MG/DL (ref 5–25)
CALCIUM SERPL-MCNC: 7.8 MG/DL (ref 8.4–10.2)
CHLORIDE SERPL-SCNC: 103 MMOL/L (ref 96–108)
CO2 SERPL-SCNC: 31 MMOL/L (ref 21–32)
CREAT SERPL-MCNC: 0.99 MG/DL (ref 0.6–1.3)
EOSINOPHIL # BLD AUTO: 0.11 THOUSAND/ÂΜL (ref 0–0.61)
EOSINOPHIL NFR BLD AUTO: 4 % (ref 0–6)
ERYTHROCYTE [DISTWIDTH] IN BLOOD BY AUTOMATED COUNT: 13.7 % (ref 11.6–15.1)
GFR SERPL CREATININE-BSD FRML MDRD: 81 ML/MIN/1.73SQ M
GLUCOSE SERPL-MCNC: 127 MG/DL (ref 65–140)
GLUCOSE SERPL-MCNC: 128 MG/DL (ref 65–140)
GLUCOSE SERPL-MCNC: 158 MG/DL (ref 65–140)
GLUCOSE SERPL-MCNC: 171 MG/DL (ref 65–140)
GLUCOSE SERPL-MCNC: 191 MG/DL (ref 65–140)
HCT VFR BLD AUTO: 26.8 % (ref 36.5–49.3)
HGB BLD-MCNC: 8.3 G/DL (ref 12–17)
IMM GRANULOCYTES # BLD AUTO: 0.02 THOUSAND/UL (ref 0–0.2)
IMM GRANULOCYTES NFR BLD AUTO: 1 % (ref 0–2)
LYMPHOCYTES # BLD AUTO: 0.8 THOUSANDS/ÂΜL (ref 0.6–4.47)
LYMPHOCYTES NFR BLD AUTO: 28 % (ref 14–44)
MCH RBC QN AUTO: 28.8 PG (ref 26.8–34.3)
MCHC RBC AUTO-ENTMCNC: 31 G/DL (ref 31.4–37.4)
MCV RBC AUTO: 93 FL (ref 82–98)
MONOCYTES # BLD AUTO: 0.4 THOUSAND/ÂΜL (ref 0.17–1.22)
MONOCYTES NFR BLD AUTO: 14 % (ref 4–12)
NEUTROPHILS # BLD AUTO: 1.47 THOUSANDS/ÂΜL (ref 1.85–7.62)
NEUTS SEG NFR BLD AUTO: 51 % (ref 43–75)
NRBC BLD AUTO-RTO: 0 /100 WBCS
PLATELET # BLD AUTO: 198 THOUSANDS/UL (ref 149–390)
PMV BLD AUTO: 9.2 FL (ref 8.9–12.7)
POTASSIUM SERPL-SCNC: 4.5 MMOL/L (ref 3.5–5.3)
RBC # BLD AUTO: 2.88 MILLION/UL (ref 3.88–5.62)
SODIUM SERPL-SCNC: 138 MMOL/L (ref 135–147)
WBC # BLD AUTO: 2.85 THOUSAND/UL (ref 4.31–10.16)

## 2025-07-25 PROCEDURE — 99232 SBSQ HOSP IP/OBS MODERATE 35: CPT | Performed by: INTERNAL MEDICINE

## 2025-07-25 PROCEDURE — G0545 PR INHERENT VISIT TO INPT: HCPCS | Performed by: INTERNAL MEDICINE

## 2025-07-25 PROCEDURE — 80048 BASIC METABOLIC PNL TOTAL CA: CPT | Performed by: INTERNAL MEDICINE

## 2025-07-25 PROCEDURE — 99233 SBSQ HOSP IP/OBS HIGH 50: CPT | Performed by: INTERNAL MEDICINE

## 2025-07-25 PROCEDURE — 82948 REAGENT STRIP/BLOOD GLUCOSE: CPT

## 2025-07-25 PROCEDURE — 85025 COMPLETE CBC W/AUTO DIFF WBC: CPT | Performed by: INTERNAL MEDICINE

## 2025-07-25 PROCEDURE — 97530 THERAPEUTIC ACTIVITIES: CPT

## 2025-07-25 RX ADMIN — HYDROCHLOROTHIAZIDE 25 MG: 25 TABLET ORAL at 17:02

## 2025-07-25 RX ADMIN — FUROSEMIDE 20 MG: 10 INJECTION, SOLUTION INTRAMUSCULAR; INTRAVENOUS at 17:02

## 2025-07-25 RX ADMIN — INSULIN GLARGINE 20 UNITS: 100 INJECTION, SOLUTION SUBCUTANEOUS at 08:07

## 2025-07-25 RX ADMIN — CARVEDILOL 25 MG: 25 TABLET, FILM COATED ORAL at 08:07

## 2025-07-25 RX ADMIN — CARVEDILOL 25 MG: 25 TABLET, FILM COATED ORAL at 17:02

## 2025-07-25 RX ADMIN — RIVAROXABAN 20 MG: 20 TABLET, FILM COATED ORAL at 17:02

## 2025-07-25 RX ADMIN — TAMSULOSIN HYDROCHLORIDE 0.4 MG: 0.4 CAPSULE ORAL at 17:02

## 2025-07-25 RX ADMIN — ACETAMINOPHEN 975 MG: 325 TABLET ORAL at 05:34

## 2025-07-25 RX ADMIN — MIRTAZAPINE 7.5 MG: 15 TABLET, FILM COATED ORAL at 21:06

## 2025-07-25 RX ADMIN — HYDROMORPHONE HYDROCHLORIDE 0.2 MG: 0.2 INJECTION, SOLUTION INTRAMUSCULAR; INTRAVENOUS; SUBCUTANEOUS at 14:28

## 2025-07-25 RX ADMIN — FUROSEMIDE 20 MG: 10 INJECTION, SOLUTION INTRAMUSCULAR; INTRAVENOUS at 08:07

## 2025-07-25 RX ADMIN — HYDROMORPHONE HYDROCHLORIDE 0.2 MG: 0.2 INJECTION, SOLUTION INTRAMUSCULAR; INTRAVENOUS; SUBCUTANEOUS at 21:06

## 2025-07-25 RX ADMIN — PANTOPRAZOLE SODIUM 40 MG: 40 TABLET, DELAYED RELEASE ORAL at 05:36

## 2025-07-25 RX ADMIN — INSULIN LISPRO 6 UNITS: 100 INJECTION, SOLUTION INTRAVENOUS; SUBCUTANEOUS at 12:06

## 2025-07-25 RX ADMIN — INSULIN LISPRO 2 UNITS: 100 INJECTION, SOLUTION INTRAVENOUS; SUBCUTANEOUS at 17:02

## 2025-07-25 RX ADMIN — INSULIN LISPRO 6 UNITS: 100 INJECTION, SOLUTION INTRAVENOUS; SUBCUTANEOUS at 17:03

## 2025-07-25 RX ADMIN — INSULIN GLARGINE 20 UNITS: 100 INJECTION, SOLUTION SUBCUTANEOUS at 21:07

## 2025-07-25 RX ADMIN — AMITRIPTYLINE HYDROCHLORIDE 30 MG: 10 TABLET, FILM COATED ORAL at 21:06

## 2025-07-25 RX ADMIN — ACETAMINOPHEN 975 MG: 325 TABLET ORAL at 21:06

## 2025-07-25 RX ADMIN — TRAMADOL HYDROCHLORIDE 100 MG: 50 TABLET, COATED ORAL at 21:06

## 2025-07-25 RX ADMIN — INSULIN LISPRO 1 UNITS: 100 INJECTION, SOLUTION INTRAVENOUS; SUBCUTANEOUS at 22:20

## 2025-07-25 RX ADMIN — ACETAMINOPHEN 975 MG: 325 TABLET ORAL at 14:22

## 2025-07-25 RX ADMIN — PANTOPRAZOLE SODIUM 40 MG: 40 TABLET, DELAYED RELEASE ORAL at 17:02

## 2025-07-25 RX ADMIN — INSULIN LISPRO 6 UNITS: 100 INJECTION, SOLUTION INTRAVENOUS; SUBCUTANEOUS at 08:11

## 2025-07-25 RX ADMIN — HYDROCHLOROTHIAZIDE 25 MG: 25 TABLET ORAL at 08:07

## 2025-07-25 RX ADMIN — INSULIN LISPRO 2 UNITS: 100 INJECTION, SOLUTION INTRAVENOUS; SUBCUTANEOUS at 12:06

## 2025-07-25 NOTE — ANESTHESIA POSTPROCEDURE EVALUATION
Post-Op Assessment Note    CV Status:  Stable  Pain Score: 0    Pain management: adequate       Mental Status:  Alert and awake   Hydration Status:  Euvolemic and stable   PONV Controlled:  Controlled   Airway Patency:  Patent and adequate     Post Op Vitals Reviewed: Yes    No anethesia notable event occurred.            Last Filed PACU Vitals:  Vitals Value Taken Time   Temp 96.2 °F (35.7 °C) 07/23/25 13:32   Pulse 83 07/23/25 13:32   /96 07/23/25 13:32   Resp 18 07/23/25 13:32   SpO2 96 % 07/23/25 13:32       Modified Rudy:     Vitals Value Taken Time   Activity 2 07/23/25 13:49   Respiration 2 07/23/25 13:49   Circulation 2 07/23/25 13:49   Consciousness 2 07/23/25 13:49   Oxygen Saturation 2 07/23/25 13:49     Modified Rudy Score: 10

## 2025-07-26 LAB
ANION GAP SERPL CALCULATED.3IONS-SCNC: 6 MMOL/L (ref 4–13)
BASOPHILS # BLD AUTO: 0.04 THOUSANDS/ÂΜL (ref 0–0.1)
BASOPHILS NFR BLD AUTO: 1 % (ref 0–1)
BUN SERPL-MCNC: 17 MG/DL (ref 5–25)
CALCIUM SERPL-MCNC: 8.2 MG/DL (ref 8.4–10.2)
CHLORIDE SERPL-SCNC: 101 MMOL/L (ref 96–108)
CO2 SERPL-SCNC: 30 MMOL/L (ref 21–32)
CREAT SERPL-MCNC: 1.19 MG/DL (ref 0.6–1.3)
EOSINOPHIL # BLD AUTO: 0.14 THOUSAND/ÂΜL (ref 0–0.61)
EOSINOPHIL NFR BLD AUTO: 5 % (ref 0–6)
ERYTHROCYTE [DISTWIDTH] IN BLOOD BY AUTOMATED COUNT: 13.7 % (ref 11.6–15.1)
GFR SERPL CREATININE-BSD FRML MDRD: 65 ML/MIN/1.73SQ M
GLUCOSE SERPL-MCNC: 125 MG/DL (ref 65–140)
GLUCOSE SERPL-MCNC: 129 MG/DL (ref 65–140)
GLUCOSE SERPL-MCNC: 161 MG/DL (ref 65–140)
GLUCOSE SERPL-MCNC: 171 MG/DL (ref 65–140)
GLUCOSE SERPL-MCNC: 190 MG/DL (ref 65–140)
HCT VFR BLD AUTO: 27.3 % (ref 36.5–49.3)
HGB BLD-MCNC: 8.6 G/DL (ref 12–17)
IMM GRANULOCYTES # BLD AUTO: 0.01 THOUSAND/UL (ref 0–0.2)
IMM GRANULOCYTES NFR BLD AUTO: 0 % (ref 0–2)
LYMPHOCYTES # BLD AUTO: 0.93 THOUSANDS/ÂΜL (ref 0.6–4.47)
LYMPHOCYTES NFR BLD AUTO: 34 % (ref 14–44)
MCH RBC QN AUTO: 29.1 PG (ref 26.8–34.3)
MCHC RBC AUTO-ENTMCNC: 31.5 G/DL (ref 31.4–37.4)
MCV RBC AUTO: 92 FL (ref 82–98)
MONOCYTES # BLD AUTO: 0.35 THOUSAND/ÂΜL (ref 0.17–1.22)
MONOCYTES NFR BLD AUTO: 13 % (ref 4–12)
NEUTROPHILS # BLD AUTO: 1.29 THOUSANDS/ÂΜL (ref 1.85–7.62)
NEUTS SEG NFR BLD AUTO: 47 % (ref 43–75)
NRBC BLD AUTO-RTO: 0 /100 WBCS
PLATELET # BLD AUTO: 202 THOUSANDS/UL (ref 149–390)
PMV BLD AUTO: 9.1 FL (ref 8.9–12.7)
POTASSIUM SERPL-SCNC: 4.8 MMOL/L (ref 3.5–5.3)
RBC # BLD AUTO: 2.96 MILLION/UL (ref 3.88–5.62)
SODIUM SERPL-SCNC: 137 MMOL/L (ref 135–147)
WBC # BLD AUTO: 2.76 THOUSAND/UL (ref 4.31–10.16)

## 2025-07-26 PROCEDURE — 99232 SBSQ HOSP IP/OBS MODERATE 35: CPT | Performed by: INTERNAL MEDICINE

## 2025-07-26 PROCEDURE — 85025 COMPLETE CBC W/AUTO DIFF WBC: CPT | Performed by: INTERNAL MEDICINE

## 2025-07-26 PROCEDURE — G0545 PR INHERENT VISIT TO INPT: HCPCS | Performed by: INTERNAL MEDICINE

## 2025-07-26 PROCEDURE — 80048 BASIC METABOLIC PNL TOTAL CA: CPT | Performed by: INTERNAL MEDICINE

## 2025-07-26 PROCEDURE — 99233 SBSQ HOSP IP/OBS HIGH 50: CPT | Performed by: INTERNAL MEDICINE

## 2025-07-26 PROCEDURE — 82948 REAGENT STRIP/BLOOD GLUCOSE: CPT

## 2025-07-26 RX ORDER — FUROSEMIDE 10 MG/ML
20 INJECTION INTRAMUSCULAR; INTRAVENOUS ONCE
Status: COMPLETED | OUTPATIENT
Start: 2025-07-26 | End: 2025-07-26

## 2025-07-26 RX ADMIN — CARVEDILOL 25 MG: 25 TABLET, FILM COATED ORAL at 17:40

## 2025-07-26 RX ADMIN — INSULIN GLARGINE 20 UNITS: 100 INJECTION, SOLUTION SUBCUTANEOUS at 21:35

## 2025-07-26 RX ADMIN — INSULIN LISPRO 6 UNITS: 100 INJECTION, SOLUTION INTRAVENOUS; SUBCUTANEOUS at 17:41

## 2025-07-26 RX ADMIN — RIVAROXABAN 20 MG: 20 TABLET, FILM COATED ORAL at 17:39

## 2025-07-26 RX ADMIN — AMITRIPTYLINE HYDROCHLORIDE 30 MG: 10 TABLET, FILM COATED ORAL at 21:35

## 2025-07-26 RX ADMIN — CARVEDILOL 25 MG: 25 TABLET, FILM COATED ORAL at 10:00

## 2025-07-26 RX ADMIN — FUROSEMIDE 20 MG: 10 INJECTION, SOLUTION INTRAMUSCULAR; INTRAVENOUS at 12:05

## 2025-07-26 RX ADMIN — INSULIN LISPRO 2 UNITS: 100 INJECTION, SOLUTION INTRAVENOUS; SUBCUTANEOUS at 17:41

## 2025-07-26 RX ADMIN — TRAMADOL HYDROCHLORIDE 100 MG: 50 TABLET, COATED ORAL at 21:35

## 2025-07-26 RX ADMIN — INSULIN LISPRO 6 UNITS: 100 INJECTION, SOLUTION INTRAVENOUS; SUBCUTANEOUS at 10:09

## 2025-07-26 RX ADMIN — HYDROCHLOROTHIAZIDE 25 MG: 25 TABLET ORAL at 17:40

## 2025-07-26 RX ADMIN — TAMSULOSIN HYDROCHLORIDE 0.4 MG: 0.4 CAPSULE ORAL at 17:40

## 2025-07-26 RX ADMIN — INSULIN LISPRO 1 UNITS: 100 INJECTION, SOLUTION INTRAVENOUS; SUBCUTANEOUS at 21:35

## 2025-07-26 RX ADMIN — HYDROCHLOROTHIAZIDE 25 MG: 25 TABLET ORAL at 10:01

## 2025-07-26 RX ADMIN — PANTOPRAZOLE SODIUM 40 MG: 40 TABLET, DELAYED RELEASE ORAL at 06:00

## 2025-07-26 RX ADMIN — INSULIN GLARGINE 20 UNITS: 100 INJECTION, SOLUTION SUBCUTANEOUS at 10:00

## 2025-07-26 RX ADMIN — ACETAMINOPHEN 975 MG: 325 TABLET ORAL at 06:00

## 2025-07-26 RX ADMIN — HYDROMORPHONE HYDROCHLORIDE 0.2 MG: 0.2 INJECTION, SOLUTION INTRAMUSCULAR; INTRAVENOUS; SUBCUTANEOUS at 13:36

## 2025-07-26 RX ADMIN — HYDROMORPHONE HYDROCHLORIDE 0.2 MG: 0.2 INJECTION, SOLUTION INTRAMUSCULAR; INTRAVENOUS; SUBCUTANEOUS at 21:35

## 2025-07-26 RX ADMIN — MIRTAZAPINE 7.5 MG: 15 TABLET, FILM COATED ORAL at 21:35

## 2025-07-26 RX ADMIN — ACETAMINOPHEN 975 MG: 325 TABLET ORAL at 21:35

## 2025-07-26 RX ADMIN — ACETAMINOPHEN 975 MG: 325 TABLET ORAL at 13:34

## 2025-07-26 RX ADMIN — INSULIN LISPRO 6 UNITS: 100 INJECTION, SOLUTION INTRAVENOUS; SUBCUTANEOUS at 12:06

## 2025-07-26 RX ADMIN — PANTOPRAZOLE SODIUM 40 MG: 40 TABLET, DELAYED RELEASE ORAL at 17:40

## 2025-07-26 RX ADMIN — INSULIN LISPRO 2 UNITS: 100 INJECTION, SOLUTION INTRAVENOUS; SUBCUTANEOUS at 12:05

## 2025-07-26 RX ADMIN — LIDOCAINE 1 PATCH: 700 PATCH TOPICAL at 10:03

## 2025-07-27 LAB
ANION GAP SERPL CALCULATED.3IONS-SCNC: 6 MMOL/L (ref 4–13)
BASOPHILS # BLD AUTO: 0.05 THOUSANDS/ÂΜL (ref 0–0.1)
BASOPHILS NFR BLD AUTO: 2 % (ref 0–1)
BUN SERPL-MCNC: 17 MG/DL (ref 5–25)
CALCIUM SERPL-MCNC: 8.3 MG/DL (ref 8.4–10.2)
CHLORIDE SERPL-SCNC: 99 MMOL/L (ref 96–108)
CO2 SERPL-SCNC: 31 MMOL/L (ref 21–32)
CREAT SERPL-MCNC: 1.07 MG/DL (ref 0.6–1.3)
EOSINOPHIL # BLD AUTO: 0.2 THOUSAND/ÂΜL (ref 0–0.61)
EOSINOPHIL NFR BLD AUTO: 6 % (ref 0–6)
ERYTHROCYTE [DISTWIDTH] IN BLOOD BY AUTOMATED COUNT: 13.9 % (ref 11.6–15.1)
GFR SERPL CREATININE-BSD FRML MDRD: 73 ML/MIN/1.73SQ M
GLUCOSE SERPL-MCNC: 136 MG/DL (ref 65–140)
GLUCOSE SERPL-MCNC: 140 MG/DL (ref 65–140)
GLUCOSE SERPL-MCNC: 141 MG/DL (ref 65–140)
GLUCOSE SERPL-MCNC: 152 MG/DL (ref 65–140)
GLUCOSE SERPL-MCNC: 175 MG/DL (ref 65–140)
HCT VFR BLD AUTO: 28.8 % (ref 36.5–49.3)
HGB BLD-MCNC: 9.2 G/DL (ref 12–17)
IMM GRANULOCYTES # BLD AUTO: 0.01 THOUSAND/UL (ref 0–0.2)
IMM GRANULOCYTES NFR BLD AUTO: 0 % (ref 0–2)
LYMPHOCYTES # BLD AUTO: 0.98 THOUSANDS/ÂΜL (ref 0.6–4.47)
LYMPHOCYTES NFR BLD AUTO: 30 % (ref 14–44)
MCH RBC QN AUTO: 29.5 PG (ref 26.8–34.3)
MCHC RBC AUTO-ENTMCNC: 31.9 G/DL (ref 31.4–37.4)
MCV RBC AUTO: 92 FL (ref 82–98)
MONOCYTES # BLD AUTO: 0.43 THOUSAND/ÂΜL (ref 0.17–1.22)
MONOCYTES NFR BLD AUTO: 13 % (ref 4–12)
NEUTROPHILS # BLD AUTO: 1.63 THOUSANDS/ÂΜL (ref 1.85–7.62)
NEUTS SEG NFR BLD AUTO: 49 % (ref 43–75)
NRBC BLD AUTO-RTO: 0 /100 WBCS
PLATELET # BLD AUTO: 253 THOUSANDS/UL (ref 149–390)
PMV BLD AUTO: 9.4 FL (ref 8.9–12.7)
POTASSIUM SERPL-SCNC: 4.8 MMOL/L (ref 3.5–5.3)
RBC # BLD AUTO: 3.12 MILLION/UL (ref 3.88–5.62)
SODIUM SERPL-SCNC: 136 MMOL/L (ref 135–147)
WBC # BLD AUTO: 3.3 THOUSAND/UL (ref 4.31–10.16)

## 2025-07-27 PROCEDURE — 85025 COMPLETE CBC W/AUTO DIFF WBC: CPT | Performed by: INTERNAL MEDICINE

## 2025-07-27 PROCEDURE — 99233 SBSQ HOSP IP/OBS HIGH 50: CPT | Performed by: INTERNAL MEDICINE

## 2025-07-27 PROCEDURE — G0545 PR INHERENT VISIT TO INPT: HCPCS | Performed by: INTERNAL MEDICINE

## 2025-07-27 PROCEDURE — 99232 SBSQ HOSP IP/OBS MODERATE 35: CPT | Performed by: INTERNAL MEDICINE

## 2025-07-27 PROCEDURE — 80048 BASIC METABOLIC PNL TOTAL CA: CPT | Performed by: INTERNAL MEDICINE

## 2025-07-27 PROCEDURE — 82948 REAGENT STRIP/BLOOD GLUCOSE: CPT

## 2025-07-27 RX ADMIN — RIVAROXABAN 20 MG: 20 TABLET, FILM COATED ORAL at 17:55

## 2025-07-27 RX ADMIN — AMITRIPTYLINE HYDROCHLORIDE 30 MG: 10 TABLET, FILM COATED ORAL at 21:56

## 2025-07-27 RX ADMIN — TAMSULOSIN HYDROCHLORIDE 0.4 MG: 0.4 CAPSULE ORAL at 17:55

## 2025-07-27 RX ADMIN — METHOCARBAMOL 750 MG: 750 TABLET ORAL at 13:10

## 2025-07-27 RX ADMIN — HYDROMORPHONE HYDROCHLORIDE 0.2 MG: 0.2 INJECTION, SOLUTION INTRAMUSCULAR; INTRAVENOUS; SUBCUTANEOUS at 19:12

## 2025-07-27 RX ADMIN — INSULIN GLARGINE 20 UNITS: 100 INJECTION, SOLUTION SUBCUTANEOUS at 08:09

## 2025-07-27 RX ADMIN — CARVEDILOL 25 MG: 25 TABLET, FILM COATED ORAL at 08:09

## 2025-07-27 RX ADMIN — LIDOCAINE 1 PATCH: 700 PATCH TOPICAL at 08:10

## 2025-07-27 RX ADMIN — METHOCARBAMOL 750 MG: 750 TABLET ORAL at 06:14

## 2025-07-27 RX ADMIN — INSULIN LISPRO 6 UNITS: 100 INJECTION, SOLUTION INTRAVENOUS; SUBCUTANEOUS at 08:11

## 2025-07-27 RX ADMIN — INSULIN LISPRO 2 UNITS: 100 INJECTION, SOLUTION INTRAVENOUS; SUBCUTANEOUS at 12:59

## 2025-07-27 RX ADMIN — INSULIN LISPRO 6 UNITS: 100 INJECTION, SOLUTION INTRAVENOUS; SUBCUTANEOUS at 17:58

## 2025-07-27 RX ADMIN — HYDROCHLOROTHIAZIDE 25 MG: 25 TABLET ORAL at 17:56

## 2025-07-27 RX ADMIN — HYDROCHLOROTHIAZIDE 25 MG: 25 TABLET ORAL at 08:09

## 2025-07-27 RX ADMIN — INSULIN LISPRO 6 UNITS: 100 INJECTION, SOLUTION INTRAVENOUS; SUBCUTANEOUS at 13:00

## 2025-07-27 RX ADMIN — ACETAMINOPHEN 975 MG: 325 TABLET ORAL at 13:09

## 2025-07-27 RX ADMIN — ACETAMINOPHEN 975 MG: 325 TABLET ORAL at 06:11

## 2025-07-27 RX ADMIN — PANTOPRAZOLE SODIUM 40 MG: 40 TABLET, DELAYED RELEASE ORAL at 17:55

## 2025-07-27 RX ADMIN — TRAMADOL HYDROCHLORIDE 100 MG: 50 TABLET, COATED ORAL at 21:56

## 2025-07-27 RX ADMIN — INSULIN LISPRO 2 UNITS: 100 INJECTION, SOLUTION INTRAVENOUS; SUBCUTANEOUS at 17:57

## 2025-07-27 RX ADMIN — CARVEDILOL 25 MG: 25 TABLET, FILM COATED ORAL at 17:55

## 2025-07-27 RX ADMIN — INSULIN GLARGINE 20 UNITS: 100 INJECTION, SOLUTION SUBCUTANEOUS at 21:56

## 2025-07-27 RX ADMIN — MIRTAZAPINE 7.5 MG: 15 TABLET, FILM COATED ORAL at 21:56

## 2025-07-27 RX ADMIN — PANTOPRAZOLE SODIUM 40 MG: 40 TABLET, DELAYED RELEASE ORAL at 06:11

## 2025-07-28 LAB
ANION GAP SERPL CALCULATED.3IONS-SCNC: 5 MMOL/L (ref 4–13)
ATRIAL RATE: 115 BPM
BASOPHILS # BLD AUTO: 0.04 THOUSANDS/ÂΜL (ref 0–0.1)
BASOPHILS NFR BLD AUTO: 1 % (ref 0–1)
BUN SERPL-MCNC: 18 MG/DL (ref 5–25)
CALCIUM SERPL-MCNC: 8.1 MG/DL (ref 8.4–10.2)
CHLORIDE SERPL-SCNC: 100 MMOL/L (ref 96–108)
CO2 SERPL-SCNC: 32 MMOL/L (ref 21–32)
CREAT SERPL-MCNC: 1.13 MG/DL (ref 0.6–1.3)
EOSINOPHIL # BLD AUTO: 0.21 THOUSAND/ÂΜL (ref 0–0.61)
EOSINOPHIL NFR BLD AUTO: 6 % (ref 0–6)
ERYTHROCYTE [DISTWIDTH] IN BLOOD BY AUTOMATED COUNT: 13.8 % (ref 11.6–15.1)
GFR SERPL CREATININE-BSD FRML MDRD: 69 ML/MIN/1.73SQ M
GLUCOSE SERPL-MCNC: 102 MG/DL (ref 65–140)
GLUCOSE SERPL-MCNC: 129 MG/DL (ref 65–140)
GLUCOSE SERPL-MCNC: 165 MG/DL (ref 65–140)
GLUCOSE SERPL-MCNC: 181 MG/DL (ref 65–140)
GLUCOSE SERPL-MCNC: 92 MG/DL (ref 65–140)
HCT VFR BLD AUTO: 28.9 % (ref 36.5–49.3)
HGB BLD-MCNC: 8.9 G/DL (ref 12–17)
IMM GRANULOCYTES # BLD AUTO: 0.01 THOUSAND/UL (ref 0–0.2)
IMM GRANULOCYTES NFR BLD AUTO: 0 % (ref 0–2)
LYMPHOCYTES # BLD AUTO: 0.83 THOUSANDS/ÂΜL (ref 0.6–4.47)
LYMPHOCYTES NFR BLD AUTO: 24 % (ref 14–44)
MCH RBC QN AUTO: 28.8 PG (ref 26.8–34.3)
MCHC RBC AUTO-ENTMCNC: 30.8 G/DL (ref 31.4–37.4)
MCV RBC AUTO: 94 FL (ref 82–98)
MONOCYTES # BLD AUTO: 0.4 THOUSAND/ÂΜL (ref 0.17–1.22)
MONOCYTES NFR BLD AUTO: 12 % (ref 4–12)
NEUTROPHILS # BLD AUTO: 1.91 THOUSANDS/ÂΜL (ref 1.85–7.62)
NEUTS SEG NFR BLD AUTO: 57 % (ref 43–75)
NRBC BLD AUTO-RTO: 0 /100 WBCS
P AXIS: 47 DEGREES
PLATELET # BLD AUTO: 230 THOUSANDS/UL (ref 149–390)
PMV BLD AUTO: 9.5 FL (ref 8.9–12.7)
POTASSIUM SERPL-SCNC: 4.5 MMOL/L (ref 3.5–5.3)
PR INTERVAL: 156 MS
QRS AXIS: 18 DEGREES
QRSD INTERVAL: 96 MS
QT INTERVAL: 330 MS
QTC INTERVAL: 457 MS
RBC # BLD AUTO: 3.09 MILLION/UL (ref 3.88–5.62)
SODIUM SERPL-SCNC: 137 MMOL/L (ref 135–147)
T WAVE AXIS: 84 DEGREES
VENTRICULAR RATE: 115 BPM
WBC # BLD AUTO: 3.4 THOUSAND/UL (ref 4.31–10.16)

## 2025-07-28 PROCEDURE — 99233 SBSQ HOSP IP/OBS HIGH 50: CPT | Performed by: INTERNAL MEDICINE

## 2025-07-28 PROCEDURE — 80048 BASIC METABOLIC PNL TOTAL CA: CPT | Performed by: INTERNAL MEDICINE

## 2025-07-28 PROCEDURE — 99232 SBSQ HOSP IP/OBS MODERATE 35: CPT | Performed by: INTERNAL MEDICINE

## 2025-07-28 PROCEDURE — 87040 BLOOD CULTURE FOR BACTERIA: CPT | Performed by: INTERNAL MEDICINE

## 2025-07-28 PROCEDURE — 82948 REAGENT STRIP/BLOOD GLUCOSE: CPT

## 2025-07-28 PROCEDURE — 93010 ELECTROCARDIOGRAM REPORT: CPT | Performed by: INTERNAL MEDICINE

## 2025-07-28 PROCEDURE — G0545 PR INHERENT VISIT TO INPT: HCPCS | Performed by: INTERNAL MEDICINE

## 2025-07-28 PROCEDURE — 85025 COMPLETE CBC W/AUTO DIFF WBC: CPT | Performed by: INTERNAL MEDICINE

## 2025-07-28 RX ORDER — FUROSEMIDE 10 MG/ML
20 INJECTION INTRAMUSCULAR; INTRAVENOUS ONCE
Status: COMPLETED | OUTPATIENT
Start: 2025-07-28 | End: 2025-07-28

## 2025-07-28 RX ORDER — INSULIN GLARGINE 100 [IU]/ML
10 INJECTION, SOLUTION SUBCUTANEOUS EVERY 12 HOURS SCHEDULED
Status: DISCONTINUED | OUTPATIENT
Start: 2025-07-28 | End: 2025-08-01 | Stop reason: HOSPADM

## 2025-07-28 RX ADMIN — TRAMADOL HYDROCHLORIDE 100 MG: 50 TABLET, COATED ORAL at 21:38

## 2025-07-28 RX ADMIN — INSULIN LISPRO 6 UNITS: 100 INJECTION, SOLUTION INTRAVENOUS; SUBCUTANEOUS at 08:57

## 2025-07-28 RX ADMIN — CARVEDILOL 25 MG: 25 TABLET, FILM COATED ORAL at 16:30

## 2025-07-28 RX ADMIN — AMITRIPTYLINE HYDROCHLORIDE 30 MG: 10 TABLET, FILM COATED ORAL at 21:38

## 2025-07-28 RX ADMIN — HYDROCHLOROTHIAZIDE 25 MG: 25 TABLET ORAL at 17:18

## 2025-07-28 RX ADMIN — FUROSEMIDE 20 MG: 10 INJECTION, SOLUTION INTRAMUSCULAR; INTRAVENOUS at 13:46

## 2025-07-28 RX ADMIN — ACETAMINOPHEN 975 MG: 325 TABLET ORAL at 13:46

## 2025-07-28 RX ADMIN — ALLOPURINOL 50 MG: 100 TABLET ORAL at 08:13

## 2025-07-28 RX ADMIN — TAMSULOSIN HYDROCHLORIDE 0.4 MG: 0.4 CAPSULE ORAL at 16:30

## 2025-07-28 RX ADMIN — INSULIN LISPRO 2 UNITS: 100 INJECTION, SOLUTION INTRAVENOUS; SUBCUTANEOUS at 17:08

## 2025-07-28 RX ADMIN — RIVAROXABAN 20 MG: 20 TABLET, FILM COATED ORAL at 17:18

## 2025-07-28 RX ADMIN — HYDROMORPHONE HYDROCHLORIDE 0.2 MG: 0.2 INJECTION, SOLUTION INTRAMUSCULAR; INTRAVENOUS; SUBCUTANEOUS at 21:31

## 2025-07-28 RX ADMIN — PANTOPRAZOLE SODIUM 40 MG: 40 TABLET, DELAYED RELEASE ORAL at 16:30

## 2025-07-28 RX ADMIN — INSULIN GLARGINE 10 UNITS: 100 INJECTION, SOLUTION SUBCUTANEOUS at 21:35

## 2025-07-28 RX ADMIN — INSULIN LISPRO 1 UNITS: 100 INJECTION, SOLUTION INTRAVENOUS; SUBCUTANEOUS at 21:35

## 2025-07-28 RX ADMIN — CARVEDILOL 25 MG: 25 TABLET, FILM COATED ORAL at 08:13

## 2025-07-28 RX ADMIN — INSULIN LISPRO 6 UNITS: 100 INJECTION, SOLUTION INTRAVENOUS; SUBCUTANEOUS at 12:20

## 2025-07-28 RX ADMIN — INSULIN LISPRO 2 UNITS: 100 INJECTION, SOLUTION INTRAVENOUS; SUBCUTANEOUS at 12:19

## 2025-07-28 RX ADMIN — ACETAMINOPHEN 975 MG: 325 TABLET ORAL at 21:38

## 2025-07-28 RX ADMIN — Medication: at 17:09

## 2025-07-28 RX ADMIN — INSULIN LISPRO 6 UNITS: 100 INJECTION, SOLUTION INTRAVENOUS; SUBCUTANEOUS at 17:08

## 2025-07-28 RX ADMIN — HYDROMORPHONE HYDROCHLORIDE 0.2 MG: 0.2 INJECTION, SOLUTION INTRAMUSCULAR; INTRAVENOUS; SUBCUTANEOUS at 16:30

## 2025-07-28 RX ADMIN — MIRTAZAPINE 7.5 MG: 15 TABLET, FILM COATED ORAL at 21:39

## 2025-07-28 RX ADMIN — HYDROCHLOROTHIAZIDE 25 MG: 25 TABLET ORAL at 08:13

## 2025-07-28 RX ADMIN — LIDOCAINE 1 PATCH: 700 PATCH TOPICAL at 08:13

## 2025-07-28 RX ADMIN — PANTOPRAZOLE SODIUM 40 MG: 40 TABLET, DELAYED RELEASE ORAL at 06:24

## 2025-07-28 NOTE — TELEPHONE ENCOUNTER
Patient remains admitted. Had low grade temp last night with shaking chills. Off antibiotics.  Repeat blood culture per ID.  Continue to follow.

## 2025-07-29 PROBLEM — E87.6 HYPOKALEMIA: Status: RESOLVED | Noted: 2025-07-09 | Resolved: 2025-07-29

## 2025-07-29 PROBLEM — E87.1 HYPONATREMIA: Status: RESOLVED | Noted: 2025-05-17 | Resolved: 2025-07-29

## 2025-07-29 LAB
ANION GAP SERPL CALCULATED.3IONS-SCNC: 6 MMOL/L (ref 4–13)
BACTERIA BLD CULT: NORMAL
BACTERIA BLD CULT: NORMAL
BASOPHILS # BLD AUTO: 0.03 THOUSANDS/ÂΜL (ref 0–0.1)
BASOPHILS NFR BLD AUTO: 1 % (ref 0–1)
BUN SERPL-MCNC: 19 MG/DL (ref 5–25)
CALCIUM SERPL-MCNC: 7.5 MG/DL (ref 8.4–10.2)
CHLORIDE SERPL-SCNC: 102 MMOL/L (ref 96–108)
CO2 SERPL-SCNC: 28 MMOL/L (ref 21–32)
CREAT SERPL-MCNC: 0.99 MG/DL (ref 0.6–1.3)
EOSINOPHIL # BLD AUTO: 0.21 THOUSAND/ÂΜL (ref 0–0.61)
EOSINOPHIL NFR BLD AUTO: 7 % (ref 0–6)
ERYTHROCYTE [DISTWIDTH] IN BLOOD BY AUTOMATED COUNT: 13.9 % (ref 11.6–15.1)
GFR SERPL CREATININE-BSD FRML MDRD: 81 ML/MIN/1.73SQ M
GLUCOSE SERPL-MCNC: 155 MG/DL (ref 65–140)
GLUCOSE SERPL-MCNC: 170 MG/DL (ref 65–140)
GLUCOSE SERPL-MCNC: 174 MG/DL (ref 65–140)
GLUCOSE SERPL-MCNC: 195 MG/DL (ref 65–140)
GLUCOSE SERPL-MCNC: 210 MG/DL (ref 65–140)
HCT VFR BLD AUTO: 28 % (ref 36.5–49.3)
HGB BLD-MCNC: 8.8 G/DL (ref 12–17)
IMM GRANULOCYTES # BLD AUTO: 0.01 THOUSAND/UL (ref 0–0.2)
IMM GRANULOCYTES NFR BLD AUTO: 0 % (ref 0–2)
LYMPHOCYTES # BLD AUTO: 0.88 THOUSANDS/ÂΜL (ref 0.6–4.47)
LYMPHOCYTES NFR BLD AUTO: 28 % (ref 14–44)
MCH RBC QN AUTO: 29.3 PG (ref 26.8–34.3)
MCHC RBC AUTO-ENTMCNC: 31.4 G/DL (ref 31.4–37.4)
MCV RBC AUTO: 93 FL (ref 82–98)
MONOCYTES # BLD AUTO: 0.47 THOUSAND/ÂΜL (ref 0.17–1.22)
MONOCYTES NFR BLD AUTO: 15 % (ref 4–12)
NEUTROPHILS # BLD AUTO: 1.53 THOUSANDS/ÂΜL (ref 1.85–7.62)
NEUTS SEG NFR BLD AUTO: 49 % (ref 43–75)
NRBC BLD AUTO-RTO: 0 /100 WBCS
PLATELET # BLD AUTO: 245 THOUSANDS/UL (ref 149–390)
PMV BLD AUTO: 9.5 FL (ref 8.9–12.7)
POTASSIUM SERPL-SCNC: 4.8 MMOL/L (ref 3.5–5.3)
RBC # BLD AUTO: 3 MILLION/UL (ref 3.88–5.62)
SODIUM SERPL-SCNC: 136 MMOL/L (ref 135–147)
WBC # BLD AUTO: 3.13 THOUSAND/UL (ref 4.31–10.16)

## 2025-07-29 PROCEDURE — 99232 SBSQ HOSP IP/OBS MODERATE 35: CPT | Performed by: INTERNAL MEDICINE

## 2025-07-29 PROCEDURE — G0545 PR INHERENT VISIT TO INPT: HCPCS | Performed by: INTERNAL MEDICINE

## 2025-07-29 PROCEDURE — 80048 BASIC METABOLIC PNL TOTAL CA: CPT | Performed by: INTERNAL MEDICINE

## 2025-07-29 PROCEDURE — 82948 REAGENT STRIP/BLOOD GLUCOSE: CPT

## 2025-07-29 PROCEDURE — 85025 COMPLETE CBC W/AUTO DIFF WBC: CPT | Performed by: INTERNAL MEDICINE

## 2025-07-29 RX ORDER — FUROSEMIDE 10 MG/ML
20 INJECTION INTRAMUSCULAR; INTRAVENOUS ONCE
Status: COMPLETED | OUTPATIENT
Start: 2025-07-29 | End: 2025-07-29

## 2025-07-29 RX ADMIN — AMITRIPTYLINE HYDROCHLORIDE 30 MG: 10 TABLET, FILM COATED ORAL at 21:55

## 2025-07-29 RX ADMIN — CARVEDILOL 25 MG: 25 TABLET, FILM COATED ORAL at 17:06

## 2025-07-29 RX ADMIN — INSULIN LISPRO 6 UNITS: 100 INJECTION, SOLUTION INTRAVENOUS; SUBCUTANEOUS at 12:18

## 2025-07-29 RX ADMIN — METHOCARBAMOL 750 MG: 750 TABLET ORAL at 13:29

## 2025-07-29 RX ADMIN — MIRTAZAPINE 7.5 MG: 15 TABLET, FILM COATED ORAL at 21:55

## 2025-07-29 RX ADMIN — INSULIN LISPRO 6 UNITS: 100 INJECTION, SOLUTION INTRAVENOUS; SUBCUTANEOUS at 17:20

## 2025-07-29 RX ADMIN — INSULIN GLARGINE 10 UNITS: 100 INJECTION, SOLUTION SUBCUTANEOUS at 08:57

## 2025-07-29 RX ADMIN — INSULIN LISPRO 2 UNITS: 100 INJECTION, SOLUTION INTRAVENOUS; SUBCUTANEOUS at 08:57

## 2025-07-29 RX ADMIN — HYDROCHLOROTHIAZIDE 25 MG: 25 TABLET ORAL at 08:58

## 2025-07-29 RX ADMIN — INSULIN LISPRO 4 UNITS: 100 INJECTION, SOLUTION INTRAVENOUS; SUBCUTANEOUS at 12:18

## 2025-07-29 RX ADMIN — HYDROMORPHONE HYDROCHLORIDE 0.2 MG: 0.2 INJECTION, SOLUTION INTRAMUSCULAR; INTRAVENOUS; SUBCUTANEOUS at 20:30

## 2025-07-29 RX ADMIN — TRAMADOL HYDROCHLORIDE 100 MG: 50 TABLET, COATED ORAL at 21:56

## 2025-07-29 RX ADMIN — HYDROCHLOROTHIAZIDE 25 MG: 25 TABLET ORAL at 17:06

## 2025-07-29 RX ADMIN — RIVAROXABAN 20 MG: 20 TABLET, FILM COATED ORAL at 17:54

## 2025-07-29 RX ADMIN — INSULIN LISPRO 6 UNITS: 100 INJECTION, SOLUTION INTRAVENOUS; SUBCUTANEOUS at 08:58

## 2025-07-29 RX ADMIN — METHOCARBAMOL 750 MG: 750 TABLET ORAL at 06:33

## 2025-07-29 RX ADMIN — ACETAMINOPHEN 975 MG: 325 TABLET ORAL at 21:53

## 2025-07-29 RX ADMIN — INSULIN GLARGINE 10 UNITS: 100 INJECTION, SOLUTION SUBCUTANEOUS at 21:56

## 2025-07-29 RX ADMIN — METHOCARBAMOL 750 MG: 750 TABLET ORAL at 00:57

## 2025-07-29 RX ADMIN — ACETAMINOPHEN 975 MG: 325 TABLET ORAL at 13:29

## 2025-07-29 RX ADMIN — INSULIN LISPRO 2 UNITS: 100 INJECTION, SOLUTION INTRAVENOUS; SUBCUTANEOUS at 17:20

## 2025-07-29 RX ADMIN — PANTOPRAZOLE SODIUM 40 MG: 40 TABLET, DELAYED RELEASE ORAL at 17:06

## 2025-07-29 RX ADMIN — PANTOPRAZOLE SODIUM 40 MG: 40 TABLET, DELAYED RELEASE ORAL at 06:32

## 2025-07-29 RX ADMIN — FUROSEMIDE 20 MG: 10 INJECTION, SOLUTION INTRAMUSCULAR; INTRAVENOUS at 12:21

## 2025-07-29 RX ADMIN — CARVEDILOL 25 MG: 25 TABLET, FILM COATED ORAL at 06:33

## 2025-07-29 RX ADMIN — ACETAMINOPHEN 975 MG: 325 TABLET ORAL at 06:33

## 2025-07-29 RX ADMIN — TAMSULOSIN HYDROCHLORIDE 0.4 MG: 0.4 CAPSULE ORAL at 17:06

## 2025-07-29 RX ADMIN — INSULIN LISPRO 1 UNITS: 100 INJECTION, SOLUTION INTRAVENOUS; SUBCUTANEOUS at 21:57

## 2025-07-30 ENCOUNTER — TELEPHONE (OUTPATIENT)
Age: 60
End: 2025-07-30

## 2025-07-30 PROBLEM — I95.9 HYPOTENSION: Status: RESOLVED | Noted: 2025-07-09 | Resolved: 2025-07-30

## 2025-07-30 LAB
ALBUMIN SERPL BCG-MCNC: 3.2 G/DL (ref 3.5–5)
ALP SERPL-CCNC: 67 U/L (ref 34–104)
ALT SERPL W P-5'-P-CCNC: 8 U/L (ref 7–52)
ANION GAP SERPL CALCULATED.3IONS-SCNC: 5 MMOL/L (ref 4–13)
AST SERPL W P-5'-P-CCNC: 16 U/L (ref 13–39)
BASOPHILS # BLD AUTO: 0.04 THOUSANDS/ÂΜL (ref 0–0.1)
BASOPHILS NFR BLD AUTO: 1 % (ref 0–1)
BILIRUB DIRECT SERPL-MCNC: 0.03 MG/DL (ref 0–0.2)
BILIRUB SERPL-MCNC: 0.29 MG/DL (ref 0.2–1)
BUN SERPL-MCNC: 20 MG/DL (ref 5–25)
CALCIUM SERPL-MCNC: 8.2 MG/DL (ref 8.4–10.2)
CHLORIDE SERPL-SCNC: 98 MMOL/L (ref 96–108)
CO2 SERPL-SCNC: 32 MMOL/L (ref 21–32)
CREAT SERPL-MCNC: 1.04 MG/DL (ref 0.6–1.3)
EOSINOPHIL # BLD AUTO: 0.28 THOUSAND/ÂΜL (ref 0–0.61)
EOSINOPHIL NFR BLD AUTO: 9 % (ref 0–6)
ERYTHROCYTE [DISTWIDTH] IN BLOOD BY AUTOMATED COUNT: 14.1 % (ref 11.6–15.1)
GFR SERPL CREATININE-BSD FRML MDRD: 76 ML/MIN/1.73SQ M
GLUCOSE SERPL-MCNC: 135 MG/DL (ref 65–140)
GLUCOSE SERPL-MCNC: 148 MG/DL (ref 65–140)
GLUCOSE SERPL-MCNC: 154 MG/DL (ref 65–140)
GLUCOSE SERPL-MCNC: 164 MG/DL (ref 65–140)
GLUCOSE SERPL-MCNC: 224 MG/DL (ref 65–140)
HCT VFR BLD AUTO: 28.8 % (ref 36.5–49.3)
HGB BLD-MCNC: 9.1 G/DL (ref 12–17)
IMM GRANULOCYTES # BLD AUTO: 0.02 THOUSAND/UL (ref 0–0.2)
IMM GRANULOCYTES NFR BLD AUTO: 1 % (ref 0–2)
LYMPHOCYTES # BLD AUTO: 0.95 THOUSANDS/ÂΜL (ref 0.6–4.47)
LYMPHOCYTES NFR BLD AUTO: 29 % (ref 14–44)
MAGNESIUM SERPL-MCNC: 1.3 MG/DL (ref 1.9–2.7)
MCH RBC QN AUTO: 29.4 PG (ref 26.8–34.3)
MCHC RBC AUTO-ENTMCNC: 31.6 G/DL (ref 31.4–37.4)
MCV RBC AUTO: 93 FL (ref 82–98)
MONOCYTES # BLD AUTO: 0.48 THOUSAND/ÂΜL (ref 0.17–1.22)
MONOCYTES NFR BLD AUTO: 15 % (ref 4–12)
NEUTROPHILS # BLD AUTO: 1.5 THOUSANDS/ÂΜL (ref 1.85–7.62)
NEUTS SEG NFR BLD AUTO: 45 % (ref 43–75)
NRBC BLD AUTO-RTO: 0 /100 WBCS
PHOSPHATE SERPL-MCNC: 5.1 MG/DL (ref 2.3–4.1)
PLATELET # BLD AUTO: 233 THOUSANDS/UL (ref 149–390)
PMV BLD AUTO: 9.7 FL (ref 8.9–12.7)
POTASSIUM SERPL-SCNC: 4.6 MMOL/L (ref 3.5–5.3)
PROCALCITONIN SERPL-MCNC: 0.11 NG/ML
PROT SERPL-MCNC: 6.4 G/DL (ref 6.4–8.4)
RBC # BLD AUTO: 3.1 MILLION/UL (ref 3.88–5.62)
SODIUM SERPL-SCNC: 135 MMOL/L (ref 135–147)
WBC # BLD AUTO: 3.27 THOUSAND/UL (ref 4.31–10.16)

## 2025-07-30 PROCEDURE — 84100 ASSAY OF PHOSPHORUS: CPT | Performed by: INTERNAL MEDICINE

## 2025-07-30 PROCEDURE — 80048 BASIC METABOLIC PNL TOTAL CA: CPT | Performed by: INTERNAL MEDICINE

## 2025-07-30 PROCEDURE — 80076 HEPATIC FUNCTION PANEL: CPT | Performed by: INTERNAL MEDICINE

## 2025-07-30 PROCEDURE — 99232 SBSQ HOSP IP/OBS MODERATE 35: CPT | Performed by: INTERNAL MEDICINE

## 2025-07-30 PROCEDURE — 83735 ASSAY OF MAGNESIUM: CPT | Performed by: INTERNAL MEDICINE

## 2025-07-30 PROCEDURE — 85025 COMPLETE CBC W/AUTO DIFF WBC: CPT | Performed by: INTERNAL MEDICINE

## 2025-07-30 PROCEDURE — 82948 REAGENT STRIP/BLOOD GLUCOSE: CPT

## 2025-07-30 PROCEDURE — G0545 PR INHERENT VISIT TO INPT: HCPCS | Performed by: INTERNAL MEDICINE

## 2025-07-30 PROCEDURE — 84145 PROCALCITONIN (PCT): CPT | Performed by: INTERNAL MEDICINE

## 2025-07-30 RX ORDER — ONDANSETRON 2 MG/ML
4 INJECTION INTRAMUSCULAR; INTRAVENOUS EVERY 6 HOURS PRN
Status: DISCONTINUED | OUTPATIENT
Start: 2025-07-30 | End: 2025-08-01 | Stop reason: HOSPADM

## 2025-07-30 RX ORDER — SUCRALFATE 1 G/1
1 TABLET ORAL
Status: DISCONTINUED | OUTPATIENT
Start: 2025-07-30 | End: 2025-08-01

## 2025-07-30 RX ORDER — MAGNESIUM SULFATE HEPTAHYDRATE 40 MG/ML
4 INJECTION, SOLUTION INTRAVENOUS ONCE
Status: COMPLETED | OUTPATIENT
Start: 2025-07-30 | End: 2025-07-30

## 2025-07-30 RX ORDER — HYDROCODONE BITARTRATE AND HOMATROPINE METHYLBROMIDE ORAL SOLUTION 5; 1.5 MG/5ML; MG/5ML
5 LIQUID ORAL
Status: DISCONTINUED | OUTPATIENT
Start: 2025-07-30 | End: 2025-08-01

## 2025-07-30 RX ADMIN — INSULIN LISPRO 4 UNITS: 100 INJECTION, SOLUTION INTRAVENOUS; SUBCUTANEOUS at 12:01

## 2025-07-30 RX ADMIN — ACETAMINOPHEN 975 MG: 325 TABLET ORAL at 13:01

## 2025-07-30 RX ADMIN — MAGNESIUM SULFATE HEPTAHYDRATE 4 G: 40 INJECTION, SOLUTION INTRAVENOUS at 09:28

## 2025-07-30 RX ADMIN — MIRTAZAPINE 7.5 MG: 15 TABLET, FILM COATED ORAL at 22:06

## 2025-07-30 RX ADMIN — PANTOPRAZOLE SODIUM 40 MG: 40 TABLET, DELAYED RELEASE ORAL at 05:16

## 2025-07-30 RX ADMIN — RIVAROXABAN 20 MG: 20 TABLET, FILM COATED ORAL at 18:24

## 2025-07-30 RX ADMIN — HYDROMORPHONE HYDROCHLORIDE 0.2 MG: 0.2 INJECTION, SOLUTION INTRAMUSCULAR; INTRAVENOUS; SUBCUTANEOUS at 18:23

## 2025-07-30 RX ADMIN — ONDANSETRON 4 MG: 2 INJECTION, SOLUTION INTRAMUSCULAR; INTRAVENOUS at 12:45

## 2025-07-30 RX ADMIN — INSULIN LISPRO 2 UNITS: 100 INJECTION, SOLUTION INTRAVENOUS; SUBCUTANEOUS at 16:53

## 2025-07-30 RX ADMIN — LIDOCAINE 1 PATCH: 700 PATCH TOPICAL at 09:27

## 2025-07-30 RX ADMIN — INSULIN LISPRO 6 UNITS: 100 INJECTION, SOLUTION INTRAVENOUS; SUBCUTANEOUS at 09:28

## 2025-07-30 RX ADMIN — AMITRIPTYLINE HYDROCHLORIDE 30 MG: 10 TABLET, FILM COATED ORAL at 22:06

## 2025-07-30 RX ADMIN — INSULIN GLARGINE 10 UNITS: 100 INJECTION, SOLUTION SUBCUTANEOUS at 20:38

## 2025-07-30 RX ADMIN — INSULIN LISPRO 6 UNITS: 100 INJECTION, SOLUTION INTRAVENOUS; SUBCUTANEOUS at 12:00

## 2025-07-30 RX ADMIN — TAMSULOSIN HYDROCHLORIDE 0.4 MG: 0.4 CAPSULE ORAL at 16:45

## 2025-07-30 RX ADMIN — INSULIN LISPRO 6 UNITS: 100 INJECTION, SOLUTION INTRAVENOUS; SUBCUTANEOUS at 18:18

## 2025-07-30 RX ADMIN — TRAMADOL HYDROCHLORIDE 100 MG: 50 TABLET, COATED ORAL at 20:38

## 2025-07-30 RX ADMIN — INSULIN LISPRO 1 UNITS: 100 INJECTION, SOLUTION INTRAVENOUS; SUBCUTANEOUS at 22:14

## 2025-07-30 RX ADMIN — METHOCARBAMOL 750 MG: 750 TABLET ORAL at 22:09

## 2025-07-30 RX ADMIN — HYDROCHLOROTHIAZIDE 25 MG: 25 TABLET ORAL at 09:27

## 2025-07-30 RX ADMIN — CARVEDILOL 25 MG: 25 TABLET, FILM COATED ORAL at 16:46

## 2025-07-30 RX ADMIN — PANTOPRAZOLE SODIUM 40 MG: 40 TABLET, DELAYED RELEASE ORAL at 16:45

## 2025-07-30 RX ADMIN — INSULIN GLARGINE 10 UNITS: 100 INJECTION, SOLUTION SUBCUTANEOUS at 09:27

## 2025-07-30 RX ADMIN — CARVEDILOL 25 MG: 25 TABLET, FILM COATED ORAL at 09:27

## 2025-07-30 RX ADMIN — HYDROCHLOROTHIAZIDE 25 MG: 25 TABLET ORAL at 18:20

## 2025-07-30 RX ADMIN — SUCRALFATE 1 G: 1 TABLET ORAL at 16:46

## 2025-07-30 RX ADMIN — ACETAMINOPHEN 975 MG: 325 TABLET ORAL at 05:16

## 2025-07-30 RX ADMIN — ACETAMINOPHEN 975 MG: 325 TABLET ORAL at 22:06

## 2025-07-31 LAB
ANION GAP SERPL CALCULATED.3IONS-SCNC: 8 MMOL/L (ref 4–13)
BASOPHILS # BLD AUTO: 0.05 THOUSANDS/ÂΜL (ref 0–0.1)
BASOPHILS NFR BLD AUTO: 1 % (ref 0–1)
BUN SERPL-MCNC: 22 MG/DL (ref 5–25)
CALCIUM SERPL-MCNC: 8.7 MG/DL (ref 8.4–10.2)
CHLORIDE SERPL-SCNC: 98 MMOL/L (ref 96–108)
CO2 SERPL-SCNC: 28 MMOL/L (ref 21–32)
CREAT SERPL-MCNC: 1.17 MG/DL (ref 0.6–1.3)
EOSINOPHIL # BLD AUTO: 0.34 THOUSAND/ÂΜL (ref 0–0.61)
EOSINOPHIL NFR BLD AUTO: 9 % (ref 0–6)
ERYTHROCYTE [DISTWIDTH] IN BLOOD BY AUTOMATED COUNT: 14.1 % (ref 11.6–15.1)
GFR SERPL CREATININE-BSD FRML MDRD: 66 ML/MIN/1.73SQ M
GLUCOSE SERPL-MCNC: 151 MG/DL (ref 65–140)
GLUCOSE SERPL-MCNC: 163 MG/DL (ref 65–140)
GLUCOSE SERPL-MCNC: 231 MG/DL (ref 65–140)
GLUCOSE SERPL-MCNC: 245 MG/DL (ref 65–140)
HCT VFR BLD AUTO: 29.5 % (ref 36.5–49.3)
HGB BLD-MCNC: 9.2 G/DL (ref 12–17)
IMM GRANULOCYTES # BLD AUTO: 0.02 THOUSAND/UL (ref 0–0.2)
IMM GRANULOCYTES NFR BLD AUTO: 1 % (ref 0–2)
LYMPHOCYTES # BLD AUTO: 0.98 THOUSANDS/ÂΜL (ref 0.6–4.47)
LYMPHOCYTES NFR BLD AUTO: 26 % (ref 14–44)
MAGNESIUM SERPL-MCNC: 1.9 MG/DL (ref 1.9–2.7)
MCH RBC QN AUTO: 29.5 PG (ref 26.8–34.3)
MCHC RBC AUTO-ENTMCNC: 31.2 G/DL (ref 31.4–37.4)
MCV RBC AUTO: 95 FL (ref 82–98)
MONOCYTES # BLD AUTO: 0.5 THOUSAND/ÂΜL (ref 0.17–1.22)
MONOCYTES NFR BLD AUTO: 13 % (ref 4–12)
NEUTROPHILS # BLD AUTO: 1.93 THOUSANDS/ÂΜL (ref 1.85–7.62)
NEUTS SEG NFR BLD AUTO: 50 % (ref 43–75)
NRBC BLD AUTO-RTO: 0 /100 WBCS
PLATELET # BLD AUTO: 275 THOUSANDS/UL (ref 149–390)
PMV BLD AUTO: 9.8 FL (ref 8.9–12.7)
POTASSIUM SERPL-SCNC: 5.1 MMOL/L (ref 3.5–5.3)
RBC # BLD AUTO: 3.12 MILLION/UL (ref 3.88–5.62)
SODIUM SERPL-SCNC: 134 MMOL/L (ref 135–147)
WBC # BLD AUTO: 3.82 THOUSAND/UL (ref 4.31–10.16)

## 2025-07-31 PROCEDURE — 80048 BASIC METABOLIC PNL TOTAL CA: CPT | Performed by: INTERNAL MEDICINE

## 2025-07-31 PROCEDURE — 99232 SBSQ HOSP IP/OBS MODERATE 35: CPT | Performed by: INTERNAL MEDICINE

## 2025-07-31 PROCEDURE — 83735 ASSAY OF MAGNESIUM: CPT | Performed by: INTERNAL MEDICINE

## 2025-07-31 PROCEDURE — G0545 PR INHERENT VISIT TO INPT: HCPCS | Performed by: INTERNAL MEDICINE

## 2025-07-31 PROCEDURE — 85025 COMPLETE CBC W/AUTO DIFF WBC: CPT | Performed by: INTERNAL MEDICINE

## 2025-07-31 PROCEDURE — 82948 REAGENT STRIP/BLOOD GLUCOSE: CPT

## 2025-07-31 RX ORDER — FUROSEMIDE 10 MG/ML
40 INJECTION INTRAMUSCULAR; INTRAVENOUS
Status: DISCONTINUED | OUTPATIENT
Start: 2025-07-31 | End: 2025-08-01 | Stop reason: HOSPADM

## 2025-07-31 RX ORDER — INSULIN LISPRO 100 [IU]/ML
8 INJECTION, SOLUTION INTRAVENOUS; SUBCUTANEOUS
Status: DISCONTINUED | OUTPATIENT
Start: 2025-07-31 | End: 2025-08-01 | Stop reason: HOSPADM

## 2025-07-31 RX ADMIN — LIDOCAINE 1 PATCH: 700 PATCH TOPICAL at 08:05

## 2025-07-31 RX ADMIN — METHOCARBAMOL 750 MG: 750 TABLET ORAL at 06:16

## 2025-07-31 RX ADMIN — MIRTAZAPINE 7.5 MG: 15 TABLET, FILM COATED ORAL at 21:16

## 2025-07-31 RX ADMIN — INSULIN LISPRO 2 UNITS: 100 INJECTION, SOLUTION INTRAVENOUS; SUBCUTANEOUS at 08:07

## 2025-07-31 RX ADMIN — ACETAMINOPHEN 975 MG: 325 TABLET ORAL at 21:16

## 2025-07-31 RX ADMIN — HYDROMORPHONE HYDROCHLORIDE 0.2 MG: 0.2 INJECTION, SOLUTION INTRAMUSCULAR; INTRAVENOUS; SUBCUTANEOUS at 22:57

## 2025-07-31 RX ADMIN — SUCRALFATE 1 G: 1 TABLET ORAL at 06:16

## 2025-07-31 RX ADMIN — CARVEDILOL 25 MG: 25 TABLET, FILM COATED ORAL at 08:05

## 2025-07-31 RX ADMIN — CARVEDILOL 25 MG: 25 TABLET, FILM COATED ORAL at 17:53

## 2025-07-31 RX ADMIN — METHOCARBAMOL 750 MG: 750 TABLET ORAL at 11:45

## 2025-07-31 RX ADMIN — ACETAMINOPHEN 975 MG: 325 TABLET ORAL at 06:16

## 2025-07-31 RX ADMIN — INSULIN GLARGINE 10 UNITS: 100 INJECTION, SOLUTION SUBCUTANEOUS at 08:10

## 2025-07-31 RX ADMIN — METHOCARBAMOL 750 MG: 750 TABLET ORAL at 21:16

## 2025-07-31 RX ADMIN — RIVAROXABAN 20 MG: 20 TABLET, FILM COATED ORAL at 19:40

## 2025-07-31 RX ADMIN — FUROSEMIDE 40 MG: 10 INJECTION, SOLUTION INTRAMUSCULAR; INTRAVENOUS at 10:57

## 2025-07-31 RX ADMIN — SUCRALFATE 1 G: 1 TABLET ORAL at 17:53

## 2025-07-31 RX ADMIN — HYDROCODONE BITARTRATE AND HOMATROPINE METHYLBROMIDE ORAL SOLUTION 5 ML: 5; 1.5 LIQUID ORAL at 00:47

## 2025-07-31 RX ADMIN — INSULIN LISPRO 6 UNITS: 100 INJECTION, SOLUTION INTRAVENOUS; SUBCUTANEOUS at 08:08

## 2025-07-31 RX ADMIN — HYDROCHLOROTHIAZIDE 25 MG: 25 TABLET ORAL at 08:06

## 2025-07-31 RX ADMIN — HYDROCHLOROTHIAZIDE 25 MG: 25 TABLET ORAL at 17:53

## 2025-07-31 RX ADMIN — PANTOPRAZOLE SODIUM 40 MG: 40 TABLET, DELAYED RELEASE ORAL at 17:53

## 2025-07-31 RX ADMIN — INSULIN GLARGINE 10 UNITS: 100 INJECTION, SOLUTION SUBCUTANEOUS at 21:14

## 2025-07-31 RX ADMIN — TRAMADOL HYDROCHLORIDE 100 MG: 50 TABLET, COATED ORAL at 21:16

## 2025-07-31 RX ADMIN — INSULIN LISPRO 6 UNITS: 100 INJECTION, SOLUTION INTRAVENOUS; SUBCUTANEOUS at 11:09

## 2025-07-31 RX ADMIN — FUROSEMIDE 40 MG: 10 INJECTION, SOLUTION INTRAMUSCULAR; INTRAVENOUS at 17:37

## 2025-07-31 RX ADMIN — HYDROCODONE BITARTRATE AND HOMATROPINE METHYLBROMIDE ORAL SOLUTION 5 ML: 5; 1.5 LIQUID ORAL at 21:16

## 2025-07-31 RX ADMIN — INSULIN LISPRO 4 UNITS: 100 INJECTION, SOLUTION INTRAVENOUS; SUBCUTANEOUS at 11:07

## 2025-07-31 RX ADMIN — AMITRIPTYLINE HYDROCHLORIDE 30 MG: 10 TABLET, FILM COATED ORAL at 21:16

## 2025-07-31 RX ADMIN — INSULIN LISPRO 8 UNITS: 100 INJECTION, SOLUTION INTRAVENOUS; SUBCUTANEOUS at 17:36

## 2025-07-31 RX ADMIN — ACETAMINOPHEN 975 MG: 325 TABLET ORAL at 13:55

## 2025-07-31 RX ADMIN — INSULIN LISPRO 2 UNITS: 100 INJECTION, SOLUTION INTRAVENOUS; SUBCUTANEOUS at 21:14

## 2025-07-31 RX ADMIN — PANTOPRAZOLE SODIUM 40 MG: 40 TABLET, DELAYED RELEASE ORAL at 06:16

## 2025-07-31 RX ADMIN — ALLOPURINOL 50 MG: 100 TABLET ORAL at 08:05

## 2025-07-31 RX ADMIN — INSULIN LISPRO 6 UNITS: 100 INJECTION, SOLUTION INTRAVENOUS; SUBCUTANEOUS at 17:36

## 2025-08-01 VITALS
WEIGHT: 287.48 LBS | TEMPERATURE: 98.2 F | HEART RATE: 82 BPM | OXYGEN SATURATION: 93 % | BODY MASS INDEX: 38.94 KG/M2 | HEIGHT: 72 IN | DIASTOLIC BLOOD PRESSURE: 94 MMHG | RESPIRATION RATE: 18 BRPM | SYSTOLIC BLOOD PRESSURE: 169 MMHG

## 2025-08-01 LAB
ANION GAP SERPL CALCULATED.3IONS-SCNC: 9 MMOL/L (ref 4–13)
BASOPHILS # BLD AUTO: 0.04 THOUSANDS/ÂΜL (ref 0–0.1)
BASOPHILS NFR BLD AUTO: 1 % (ref 0–1)
BUN SERPL-MCNC: 25 MG/DL (ref 5–25)
CALCIUM SERPL-MCNC: 8.7 MG/DL (ref 8.4–10.2)
CHLORIDE SERPL-SCNC: 97 MMOL/L (ref 96–108)
CO2 SERPL-SCNC: 29 MMOL/L (ref 21–32)
CREAT SERPL-MCNC: 1.39 MG/DL (ref 0.6–1.3)
EOSINOPHIL # BLD AUTO: 0.29 THOUSAND/ÂΜL (ref 0–0.61)
EOSINOPHIL NFR BLD AUTO: 8 % (ref 0–6)
ERYTHROCYTE [DISTWIDTH] IN BLOOD BY AUTOMATED COUNT: 14 % (ref 11.6–15.1)
GFR SERPL CREATININE-BSD FRML MDRD: 53 ML/MIN/1.73SQ M
GLUCOSE SERPL-MCNC: 168 MG/DL (ref 65–140)
GLUCOSE SERPL-MCNC: 169 MG/DL (ref 65–140)
GLUCOSE SERPL-MCNC: 185 MG/DL (ref 65–140)
GLUCOSE SERPL-MCNC: 233 MG/DL (ref 65–140)
HCT VFR BLD AUTO: 28.4 % (ref 36.5–49.3)
HGB BLD-MCNC: 9 G/DL (ref 12–17)
IMM GRANULOCYTES # BLD AUTO: 0.02 THOUSAND/UL (ref 0–0.2)
IMM GRANULOCYTES NFR BLD AUTO: 1 % (ref 0–2)
LYMPHOCYTES # BLD AUTO: 0.95 THOUSANDS/ÂΜL (ref 0.6–4.47)
LYMPHOCYTES NFR BLD AUTO: 25 % (ref 14–44)
MAGNESIUM SERPL-MCNC: 1.5 MG/DL (ref 1.9–2.7)
MCH RBC QN AUTO: 29.1 PG (ref 26.8–34.3)
MCHC RBC AUTO-ENTMCNC: 31.7 G/DL (ref 31.4–37.4)
MCV RBC AUTO: 92 FL (ref 82–98)
MONOCYTES # BLD AUTO: 0.49 THOUSAND/ÂΜL (ref 0.17–1.22)
MONOCYTES NFR BLD AUTO: 13 % (ref 4–12)
NEUTROPHILS # BLD AUTO: 2.02 THOUSANDS/ÂΜL (ref 1.85–7.62)
NEUTS SEG NFR BLD AUTO: 52 % (ref 43–75)
NRBC BLD AUTO-RTO: 0 /100 WBCS
PHOSPHATE SERPL-MCNC: 4.6 MG/DL (ref 2.3–4.1)
PLATELET # BLD AUTO: 245 THOUSANDS/UL (ref 149–390)
PMV BLD AUTO: 9.6 FL (ref 8.9–12.7)
POTASSIUM SERPL-SCNC: 4.8 MMOL/L (ref 3.5–5.3)
RBC # BLD AUTO: 3.09 MILLION/UL (ref 3.88–5.62)
SODIUM SERPL-SCNC: 135 MMOL/L (ref 135–147)
WBC # BLD AUTO: 3.81 THOUSAND/UL (ref 4.31–10.16)

## 2025-08-01 PROCEDURE — 83735 ASSAY OF MAGNESIUM: CPT | Performed by: INTERNAL MEDICINE

## 2025-08-01 PROCEDURE — 82948 REAGENT STRIP/BLOOD GLUCOSE: CPT

## 2025-08-01 PROCEDURE — 99232 SBSQ HOSP IP/OBS MODERATE 35: CPT | Performed by: INTERNAL MEDICINE

## 2025-08-01 PROCEDURE — 80048 BASIC METABOLIC PNL TOTAL CA: CPT | Performed by: INTERNAL MEDICINE

## 2025-08-01 PROCEDURE — 99239 HOSP IP/OBS DSCHRG MGMT >30: CPT | Performed by: INTERNAL MEDICINE

## 2025-08-01 PROCEDURE — 84100 ASSAY OF PHOSPHORUS: CPT | Performed by: INTERNAL MEDICINE

## 2025-08-01 PROCEDURE — 85025 COMPLETE CBC W/AUTO DIFF WBC: CPT | Performed by: INTERNAL MEDICINE

## 2025-08-01 PROCEDURE — G0545 PR INHERENT VISIT TO INPT: HCPCS | Performed by: INTERNAL MEDICINE

## 2025-08-01 RX ORDER — MAGNESIUM SULFATE HEPTAHYDRATE 40 MG/ML
2 INJECTION, SOLUTION INTRAVENOUS ONCE
Status: COMPLETED | OUTPATIENT
Start: 2025-08-01 | End: 2025-08-01

## 2025-08-01 RX ORDER — MIRTAZAPINE 7.5 MG/1
7.5 TABLET, FILM COATED ORAL
Qty: 30 TABLET | Refills: 0 | Status: SHIPPED | OUTPATIENT
Start: 2025-08-01 | End: 2025-08-07

## 2025-08-01 RX ORDER — HYDROCODONE POLISTIREX AND CHLORPHENIRAMINE POLISTIREX 10; 8 MG/5ML; MG/5ML
5 SUSPENSION, EXTENDED RELEASE ORAL
Qty: 70 ML | Refills: 0 | Status: SHIPPED | OUTPATIENT
Start: 2025-08-01

## 2025-08-01 RX ORDER — TAMSULOSIN HYDROCHLORIDE 0.4 MG/1
0.4 CAPSULE ORAL
Qty: 30 CAPSULE | Refills: 0 | Status: SHIPPED | OUTPATIENT
Start: 2025-08-01

## 2025-08-01 RX ORDER — HYDROCODONE POLISTIREX AND CHLORPHENIRAMINE POLISTIREX 10; 8 MG/5ML; MG/5ML
5 SUSPENSION, EXTENDED RELEASE ORAL
Status: DISCONTINUED | OUTPATIENT
Start: 2025-08-01 | End: 2025-08-01 | Stop reason: HOSPADM

## 2025-08-01 RX ORDER — ALLOPURINOL 100 MG/1
50 TABLET ORAL 2 TIMES WEEKLY
Qty: 8 TABLET | Refills: 0 | Status: SHIPPED | OUTPATIENT
Start: 2025-08-04

## 2025-08-01 RX ADMIN — INSULIN LISPRO 4 UNITS: 100 INJECTION, SOLUTION INTRAVENOUS; SUBCUTANEOUS at 17:49

## 2025-08-01 RX ADMIN — CARVEDILOL 25 MG: 25 TABLET, FILM COATED ORAL at 17:49

## 2025-08-01 RX ADMIN — HYDROCHLOROTHIAZIDE 25 MG: 25 TABLET ORAL at 07:56

## 2025-08-01 RX ADMIN — PANTOPRAZOLE SODIUM 40 MG: 40 TABLET, DELAYED RELEASE ORAL at 17:49

## 2025-08-01 RX ADMIN — TAMSULOSIN HYDROCHLORIDE 0.4 MG: 0.4 CAPSULE ORAL at 17:49

## 2025-08-01 RX ADMIN — CARVEDILOL 25 MG: 25 TABLET, FILM COATED ORAL at 07:56

## 2025-08-01 RX ADMIN — LIDOCAINE 1 PATCH: 700 PATCH TOPICAL at 07:56

## 2025-08-01 RX ADMIN — SUCRALFATE 1 G: 1 TABLET ORAL at 07:56

## 2025-08-01 RX ADMIN — METHOCARBAMOL 750 MG: 750 TABLET ORAL at 14:30

## 2025-08-01 RX ADMIN — MAGNESIUM SULFATE HEPTAHYDRATE 2 G: 40 INJECTION, SOLUTION INTRAVENOUS at 09:49

## 2025-08-01 RX ADMIN — ACETAMINOPHEN 975 MG: 325 TABLET ORAL at 14:30

## 2025-08-01 RX ADMIN — INSULIN LISPRO 2 UNITS: 100 INJECTION, SOLUTION INTRAVENOUS; SUBCUTANEOUS at 07:58

## 2025-08-01 RX ADMIN — HYDROCHLOROTHIAZIDE 25 MG: 25 TABLET ORAL at 17:49

## 2025-08-01 RX ADMIN — INSULIN LISPRO 8 UNITS: 100 INJECTION, SOLUTION INTRAVENOUS; SUBCUTANEOUS at 07:57

## 2025-08-01 RX ADMIN — PANTOPRAZOLE SODIUM 40 MG: 40 TABLET, DELAYED RELEASE ORAL at 04:59

## 2025-08-01 RX ADMIN — INSULIN GLARGINE 10 UNITS: 100 INJECTION, SOLUTION SUBCUTANEOUS at 07:56

## 2025-08-01 RX ADMIN — ACETAMINOPHEN 975 MG: 325 TABLET ORAL at 04:59

## 2025-08-01 RX ADMIN — INSULIN LISPRO 8 UNITS: 100 INJECTION, SOLUTION INTRAVENOUS; SUBCUTANEOUS at 17:49

## 2025-08-01 RX ADMIN — INSULIN LISPRO 8 UNITS: 100 INJECTION, SOLUTION INTRAVENOUS; SUBCUTANEOUS at 11:55

## 2025-08-01 RX ADMIN — FUROSEMIDE 40 MG: 10 INJECTION, SOLUTION INTRAMUSCULAR; INTRAVENOUS at 07:56

## 2025-08-01 RX ADMIN — INSULIN LISPRO 2 UNITS: 100 INJECTION, SOLUTION INTRAVENOUS; SUBCUTANEOUS at 11:55

## 2025-08-02 LAB — BACTERIA BLD CULT: NORMAL

## 2025-08-04 ENCOUNTER — TRANSITIONAL CARE MANAGEMENT (OUTPATIENT)
Age: 60
End: 2025-08-04

## 2025-08-04 ENCOUNTER — TELEPHONE (OUTPATIENT)
Dept: NUTRITION | Facility: CLINIC | Age: 60
End: 2025-08-04

## 2025-08-05 ENCOUNTER — TELEPHONE (OUTPATIENT)
Dept: GASTROENTEROLOGY | Facility: CLINIC | Age: 60
End: 2025-08-05

## 2025-08-06 ENCOUNTER — OFFICE VISIT (OUTPATIENT)
Dept: ENDOCRINOLOGY | Facility: CLINIC | Age: 60
End: 2025-08-06
Payer: MEDICARE

## 2025-08-06 VITALS
DIASTOLIC BLOOD PRESSURE: 70 MMHG | WEIGHT: 281 LBS | BODY MASS INDEX: 38.06 KG/M2 | HEART RATE: 80 BPM | OXYGEN SATURATION: 98 % | HEIGHT: 72 IN | SYSTOLIC BLOOD PRESSURE: 130 MMHG

## 2025-08-06 DIAGNOSIS — E11.65 TYPE 2 DIABETES MELLITUS WITH HYPERGLYCEMIA, WITHOUT LONG-TERM CURRENT USE OF INSULIN (HCC): Primary | ICD-10-CM

## 2025-08-06 PROBLEM — R11.2 NAUSEA VOMITING AND DIARRHEA: Status: RESOLVED | Noted: 2025-07-07 | Resolved: 2025-08-06

## 2025-08-06 PROBLEM — R19.7 NAUSEA VOMITING AND DIARRHEA: Status: RESOLVED | Noted: 2025-07-07 | Resolved: 2025-08-06

## 2025-08-06 PROCEDURE — 99214 OFFICE O/P EST MOD 30 MIN: CPT | Performed by: PHYSICIAN ASSISTANT

## 2025-08-06 PROCEDURE — G2211 COMPLEX E/M VISIT ADD ON: HCPCS | Performed by: PHYSICIAN ASSISTANT

## 2025-08-06 RX ORDER — LANCETS 28 GAUGE
EACH MISCELLANEOUS
Qty: 100 EACH | Refills: 3 | Status: SHIPPED | OUTPATIENT
Start: 2025-08-06

## 2025-08-07 ENCOUNTER — NURSE TRIAGE (OUTPATIENT)
Age: 60
End: 2025-08-07

## 2025-08-07 ENCOUNTER — OFFICE VISIT (OUTPATIENT)
Age: 60
End: 2025-08-07
Payer: MEDICARE

## 2025-08-07 VITALS
BODY MASS INDEX: 37.93 KG/M2 | HEIGHT: 72 IN | OXYGEN SATURATION: 90 % | SYSTOLIC BLOOD PRESSURE: 118 MMHG | HEART RATE: 88 BPM | DIASTOLIC BLOOD PRESSURE: 64 MMHG | WEIGHT: 280 LBS

## 2025-08-07 DIAGNOSIS — I10 ESSENTIAL HYPERTENSION: ICD-10-CM

## 2025-08-07 DIAGNOSIS — C15.5 MALIGNANT NEOPLASM OF LOWER THIRD OF ESOPHAGUS (HCC): ICD-10-CM

## 2025-08-07 DIAGNOSIS — R65.21 SEPTIC SHOCK (HCC): Primary | ICD-10-CM

## 2025-08-07 DIAGNOSIS — E11.22 TYPE 2 DIABETES MELLITUS WITH CHRONIC KIDNEY DISEASE, WITHOUT LONG-TERM CURRENT USE OF INSULIN, UNSPECIFIED CKD STAGE (HCC): ICD-10-CM

## 2025-08-07 DIAGNOSIS — A41.9 SEPTIC SHOCK (HCC): Primary | ICD-10-CM

## 2025-08-07 PROCEDURE — 99495 TRANSJ CARE MGMT MOD F2F 14D: CPT | Performed by: INTERNAL MEDICINE

## 2025-08-07 RX ORDER — TRAMADOL HYDROCHLORIDE 50 MG/1
50 TABLET ORAL EVERY 8 HOURS PRN
Qty: 100 TABLET | Refills: 0 | Status: SHIPPED | OUTPATIENT
Start: 2025-08-07

## 2025-08-08 DIAGNOSIS — E11.65 TYPE 2 DIABETES MELLITUS WITH HYPERGLYCEMIA, WITHOUT LONG-TERM CURRENT USE OF INSULIN (HCC): Primary | ICD-10-CM

## 2025-08-08 PROBLEM — R50.9 FEVER: Status: RESOLVED | Noted: 2025-07-09 | Resolved: 2025-08-08

## 2025-08-08 RX ORDER — BLOOD SUGAR DIAGNOSTIC
STRIP MISCELLANEOUS
Qty: 200 STRIP | Refills: 1 | Status: SHIPPED | OUTPATIENT
Start: 2025-08-08

## 2025-08-08 RX ORDER — BLOOD-GLUCOSE METER
EACH MISCELLANEOUS
Qty: 1 KIT | Refills: 1 | Status: SHIPPED | OUTPATIENT
Start: 2025-08-08

## 2025-08-12 ENCOUNTER — TELEPHONE (OUTPATIENT)
Dept: INFUSION CENTER | Facility: HOSPITAL | Age: 60
End: 2025-08-12

## 2025-08-12 ENCOUNTER — TELEPHONE (OUTPATIENT)
Dept: HEMATOLOGY ONCOLOGY | Facility: CLINIC | Age: 60
End: 2025-08-12

## 2025-08-12 ENCOUNTER — OFFICE VISIT (OUTPATIENT)
Dept: HEMATOLOGY ONCOLOGY | Facility: CLINIC | Age: 60
End: 2025-08-12
Payer: MEDICARE

## 2025-08-12 PROBLEM — D70.1 CHEMOTHERAPY INDUCED NEUTROPENIA (HCC): Status: ACTIVE | Noted: 2025-08-12

## 2025-08-12 PROBLEM — T45.1X5A CHEMOTHERAPY INDUCED NEUTROPENIA (HCC): Status: ACTIVE | Noted: 2025-08-12

## 2025-08-13 ENCOUNTER — TELEPHONE (OUTPATIENT)
Dept: ENDOCRINOLOGY | Facility: CLINIC | Age: 60
End: 2025-08-13

## 2025-08-13 ENCOUNTER — TELEPHONE (OUTPATIENT)
Age: 60
End: 2025-08-13

## 2025-08-18 DIAGNOSIS — D70.1 CHEMOTHERAPY INDUCED NEUTROPENIA (HCC): ICD-10-CM

## 2025-08-18 DIAGNOSIS — T45.1X5A CHEMOTHERAPY INDUCED NEUTROPENIA (HCC): ICD-10-CM

## 2025-08-18 DIAGNOSIS — C15.5 MALIGNANT NEOPLASM OF LOWER THIRD OF ESOPHAGUS (HCC): Primary | ICD-10-CM

## 2025-08-19 ENCOUNTER — NUTRITION (OUTPATIENT)
Dept: NUTRITION | Facility: CLINIC | Age: 60
End: 2025-08-19

## 2025-08-19 ENCOUNTER — TELEPHONE (OUTPATIENT)
Dept: CARDIAC SURGERY | Facility: CLINIC | Age: 60
End: 2025-08-19

## 2025-08-19 VITALS — WEIGHT: 274 LBS | BODY MASS INDEX: 37.11 KG/M2 | HEIGHT: 72 IN

## 2025-08-19 DIAGNOSIS — M77.8 RIGHT WRIST TENDONITIS: Primary | ICD-10-CM

## 2025-08-19 DIAGNOSIS — Z71.3 NUTRITIONAL COUNSELING: Primary | ICD-10-CM

## 2025-08-19 DIAGNOSIS — M25.641 FINGER STIFFNESS, RIGHT: ICD-10-CM

## 2025-08-19 PROCEDURE — 99213 OFFICE O/P EST LOW 20 MIN: CPT | Performed by: ORTHOPAEDIC SURGERY

## 2025-08-20 ENCOUNTER — SOCIAL WORK (OUTPATIENT)
Dept: PALLIATIVE MEDICINE | Facility: CLINIC | Age: 60
End: 2025-08-20
Payer: MEDICARE

## 2025-08-20 ENCOUNTER — PATIENT OUTREACH (OUTPATIENT)
Dept: HEMATOLOGY ONCOLOGY | Facility: CLINIC | Age: 60
End: 2025-08-20

## 2025-08-20 ENCOUNTER — CONSULT (OUTPATIENT)
Dept: PALLIATIVE MEDICINE | Facility: CLINIC | Age: 60
End: 2025-08-20
Attending: INTERNAL MEDICINE
Payer: MEDICARE

## 2025-08-20 ENCOUNTER — DOCUMENTATION (OUTPATIENT)
Dept: HEMATOLOGY ONCOLOGY | Facility: CLINIC | Age: 60
End: 2025-08-20

## 2025-08-20 VITALS
WEIGHT: 277.5 LBS | DIASTOLIC BLOOD PRESSURE: 70 MMHG | OXYGEN SATURATION: 95 % | HEIGHT: 72 IN | BODY MASS INDEX: 37.59 KG/M2 | HEART RATE: 81 BPM | RESPIRATION RATE: 18 BRPM | SYSTOLIC BLOOD PRESSURE: 92 MMHG | TEMPERATURE: 97.5 F

## 2025-08-20 DIAGNOSIS — F11.20 OPIOID DEPENDENCE, UNCOMPLICATED (HCC): ICD-10-CM

## 2025-08-20 DIAGNOSIS — M79.604 CHRONIC PAIN OF BOTH LOWER EXTREMITIES: ICD-10-CM

## 2025-08-20 DIAGNOSIS — Z71.89 GOALS OF CARE, COUNSELING/DISCUSSION: ICD-10-CM

## 2025-08-20 DIAGNOSIS — Z71.89 COUNSELING AND COORDINATION OF CARE: Primary | ICD-10-CM

## 2025-08-20 DIAGNOSIS — Z51.5 PALLIATIVE CARE PATIENT: ICD-10-CM

## 2025-08-20 DIAGNOSIS — G89.29 CHRONIC PAIN OF BOTH LOWER EXTREMITIES: ICD-10-CM

## 2025-08-20 DIAGNOSIS — M79.605 CHRONIC PAIN OF BOTH LOWER EXTREMITIES: ICD-10-CM

## 2025-08-20 DIAGNOSIS — R43.2 DYSGEUSIA: Primary | ICD-10-CM

## 2025-08-20 DIAGNOSIS — C15.9 ESOPHAGEAL CARCINOMA (HCC): ICD-10-CM

## 2025-08-20 PROBLEM — M79.606 CHRONIC LEG PAIN: Status: ACTIVE | Noted: 2025-08-20

## 2025-08-20 PROCEDURE — 99214 OFFICE O/P EST MOD 30 MIN: CPT | Performed by: STUDENT IN AN ORGANIZED HEALTH CARE EDUCATION/TRAINING PROGRAM

## 2025-08-20 PROCEDURE — G2211 COMPLEX E/M VISIT ADD ON: HCPCS | Performed by: STUDENT IN AN ORGANIZED HEALTH CARE EDUCATION/TRAINING PROGRAM

## 2025-08-20 PROCEDURE — NC001 PR NO CHARGE

## 2025-08-21 ENCOUNTER — RESULTS FOLLOW-UP (OUTPATIENT)
Dept: HEMATOLOGY ONCOLOGY | Facility: CLINIC | Age: 60
End: 2025-08-21

## 2025-08-21 ENCOUNTER — OFFICE VISIT (OUTPATIENT)
Dept: NEPHROLOGY | Facility: CLINIC | Age: 60
End: 2025-08-21
Payer: MEDICARE

## 2025-08-21 VITALS
SYSTOLIC BLOOD PRESSURE: 118 MMHG | OXYGEN SATURATION: 98 % | WEIGHT: 275 LBS | BODY MASS INDEX: 37.25 KG/M2 | HEIGHT: 72 IN | DIASTOLIC BLOOD PRESSURE: 76 MMHG | HEART RATE: 76 BPM

## 2025-08-21 DIAGNOSIS — C15.5 MALIGNANT NEOPLASM OF LOWER THIRD OF ESOPHAGUS (HCC): Primary | ICD-10-CM

## 2025-08-21 DIAGNOSIS — E66.812 CLASS 2 SEVERE OBESITY DUE TO EXCESS CALORIES WITH SERIOUS COMORBIDITY AND BODY MASS INDEX (BMI) OF 37.0 TO 37.9 IN ADULT (HCC): ICD-10-CM

## 2025-08-21 DIAGNOSIS — C15.5 MALIGNANT NEOPLASM OF LOWER THIRD OF ESOPHAGUS (HCC): ICD-10-CM

## 2025-08-21 DIAGNOSIS — N20.0 NEPHROLITHIASIS: ICD-10-CM

## 2025-08-21 DIAGNOSIS — N18.32 STAGE 3B CHRONIC KIDNEY DISEASE (HCC): ICD-10-CM

## 2025-08-21 DIAGNOSIS — N18.32 TYPE 2 DIABETES MELLITUS WITH STAGE 3B CHRONIC KIDNEY DISEASE, WITHOUT LONG-TERM CURRENT USE OF INSULIN (HCC): Primary | ICD-10-CM

## 2025-08-21 DIAGNOSIS — D64.9 ANEMIA, UNSPECIFIED TYPE: ICD-10-CM

## 2025-08-21 DIAGNOSIS — E66.01 CLASS 2 SEVERE OBESITY DUE TO EXCESS CALORIES WITH SERIOUS COMORBIDITY AND BODY MASS INDEX (BMI) OF 37.0 TO 37.9 IN ADULT (HCC): ICD-10-CM

## 2025-08-21 DIAGNOSIS — E11.22 TYPE 2 DIABETES MELLITUS WITH STAGE 3B CHRONIC KIDNEY DISEASE, WITHOUT LONG-TERM CURRENT USE OF INSULIN (HCC): Primary | ICD-10-CM

## 2025-08-21 DIAGNOSIS — I10 ESSENTIAL HYPERTENSION: ICD-10-CM

## 2025-08-21 PROBLEM — N18.31 ACUTE KIDNEY INJURY SUPERIMPOSED ON STAGE 3A CHRONIC KIDNEY DISEASE (HCC): Status: RESOLVED | Noted: 2025-07-01 | Resolved: 2025-08-21

## 2025-08-21 PROBLEM — N17.9 AKI (ACUTE KIDNEY INJURY) (HCC): Status: RESOLVED | Noted: 2025-07-06 | Resolved: 2025-08-21

## 2025-08-21 PROBLEM — N17.9 ACUTE KIDNEY INJURY SUPERIMPOSED ON STAGE 3A CHRONIC KIDNEY DISEASE (HCC): Status: RESOLVED | Noted: 2025-07-01 | Resolved: 2025-08-21

## 2025-08-21 PROCEDURE — 99214 OFFICE O/P EST MOD 30 MIN: CPT | Performed by: INTERNAL MEDICINE

## 2025-08-21 PROCEDURE — G2211 COMPLEX E/M VISIT ADD ON: HCPCS | Performed by: INTERNAL MEDICINE

## 2025-08-22 ENCOUNTER — TELEPHONE (OUTPATIENT)
Age: 60
End: 2025-08-22

## 2025-08-22 ENCOUNTER — TELEPHONE (OUTPATIENT)
Dept: CARDIAC SURGERY | Facility: CLINIC | Age: 60
End: 2025-08-22

## 2025-08-22 ENCOUNTER — TELEPHONE (OUTPATIENT)
Dept: ENDOCRINOLOGY | Facility: CLINIC | Age: 60
End: 2025-08-22

## (undated) DEVICE — SPLINT 4 X 15 IN FAST SET PLASTER

## (undated) DEVICE — STERILE BETHLEHEM PLASTIC HAND: Brand: CARDINAL HEALTH

## (undated) DEVICE — PREMIUM DRY TRAY LF: Brand: MEDLINE INDUSTRIES, INC.

## (undated) DEVICE — TOWEL SET X-RAY

## (undated) DEVICE — SUT MONOCRYL 4-0 PS-2 18 IN Y496G

## (undated) DEVICE — GLOVE INDICATOR PI UNDERGLOVE SZ 8 BLUE

## (undated) DEVICE — GLOVE SRG BIOGEL 6.5

## (undated) DEVICE — ELECTRODE BLADE MOD E-Z CLEAN  2.75IN 7CM -0012AM

## (undated) DEVICE — OCCLUSIVE GAUZE STRIP,3% BISMUTH TRIBROMOPHENATE IN PETROLATUM BLEND: Brand: XEROFORM

## (undated) DEVICE — SYRINGE 30ML LL

## (undated) DEVICE — DECANTER: Brand: UNBRANDED

## (undated) DEVICE — NEEDLE 25G X 1 1/2

## (undated) DEVICE — PREP PAD BNS: Brand: CONVERTORS

## (undated) DEVICE — BETHLEHEM UNIVERSAL  MIONR EXT: Brand: CARDINAL HEALTH

## (undated) DEVICE — CAST PADDING 4 IN SYNTHETIC NON-STRL

## (undated) DEVICE — SYRINGE 10ML LL

## (undated) DEVICE — CHLORHEXIDINE 4PCT 4 OZ

## (undated) DEVICE — SUT MONOCRYL 4-0 PS-2 27 IN Y426H

## (undated) DEVICE — SUT ETHILON 4-0 PS-2 18 IN 1667H

## (undated) DEVICE — BLADE SAGITTAL 25.6 X 9.5MM

## (undated) DEVICE — SUT VICRYL 3-0 SH 27 IN J416H

## (undated) DEVICE — GLOVE PI ULTRA TOUCH SZ.8.0

## (undated) DEVICE — PADDING CAST 4 IN  COTTON STRL

## (undated) DEVICE — SUT ETHILON 3-0 PS-1 18 IN 1663G

## (undated) DEVICE — INTENDED FOR TISSUE SEPARATION, AND OTHER PROCEDURES THAT REQUIRE A SHARP SURGICAL BLADE TO PUNCTURE OR CUT.: Brand: BARD-PARKER ® CARBON RIB-BACK BLADES

## (undated) DEVICE — NEEDLE 18 G X 1 1/2

## (undated) DEVICE — CURITY NON-ADHERENT STRIPS: Brand: CURITY

## (undated) DEVICE — SCD SEQUENTIAL COMPRESSION COMFORT SLEEVE MEDIUM KNEE LENGTH: Brand: KENDALL SCD

## (undated) DEVICE — DRAPE CRANI

## (undated) DEVICE — SPECIMEN CONTAINER STERILE PEEL PACK

## (undated) DEVICE — GLOVE INDICATOR PI UNDERGLOVE SZ 6.5 BLUE

## (undated) DEVICE — ANTIBACTERIAL UNDYED BRAIDED (POLYGLACTIN 910), SYNTHETIC ABSORBABLE SUTURE: Brand: COATED VICRYL

## (undated) DEVICE — BETHLEHEM UNIVERSAL MINOR GEN: Brand: CARDINAL HEALTH

## (undated) DEVICE — SPONGE LAP 18 X 18 IN STRL RFD

## (undated) DEVICE — DRAPE STERI 1010 18IN X 12IN

## (undated) DEVICE — GUIDEWIRE AMPLATZ SS .038 180CM

## (undated) DEVICE — 2000CC GUARDIAN II: Brand: GUARDIAN

## (undated) DEVICE — ACE WRAP 4 IN STERILE

## (undated) DEVICE — GAUZE SPONGES,16 PLY: Brand: CURITY

## (undated) DEVICE — TUBING SUCTION 5MM X 12 FT

## (undated) DEVICE — SUT MONOCRYL 3-0 SH 27 IN Y416H

## (undated) DEVICE — GLOVE SRG BIOGEL ECLIPSE 7

## (undated) DEVICE — 10FR FRAZIER SUCTION HANDLE: Brand: CARDINAL HEALTH

## (undated) DEVICE — Device

## (undated) DEVICE — ASTOUND STANDARD SURGICAL GOWN, XL: Brand: CONVERTORS

## (undated) DEVICE — EXOFIN PRECISION PEN HIGH VISCOSITY TOPICAL SKIN ADHESIVE: Brand: EXOFIN PRECISION PEN, 1G

## (undated) DEVICE — GLOVE INDICATOR PI UNDERGLOVE SZ 7 BLUE

## (undated) DEVICE — WEBRIL 6 IN UNSTERILE

## (undated) DEVICE — GLOVE PI ULTRA TOUCH SZ.6.5

## (undated) DEVICE — SUT VICRYL 3-0 REEL 54 IN J285G

## (undated) DEVICE — CHLORAPREP HI-LITE 26ML ORANGE

## (undated) DEVICE — GUIDEWIRE STRGHT TIP 0.035 IN  SOLO PLUS

## (undated) DEVICE — SUT PROLENE 3-0 SH 48 IN 8534H

## (undated) DEVICE — SUT VICRYL 1 CT-1 27 IN J261H

## (undated) DEVICE — ABDOMINAL PAD: Brand: DERMACEA

## (undated) DEVICE — 3M™ DURAPORE™ SURGICAL TAPE 2 INCHES X 10YARDS (5.0CM X 9.1M) 6ROLLS/CARTON 10CARTONS/CASE 1538-2: Brand: 3M™ DURAPORE™

## (undated) DEVICE — SUT VICRYL 3-0 PS-2 27 IN J427H

## (undated) DEVICE — KERLIX BANDAGE ROLL: Brand: KERLIX

## (undated) DEVICE — INTENDED FOR TISSUE SEPARATION, AND OTHER PROCEDURES THAT REQUIRE A SHARP SURGICAL BLADE TO PUNCTURE OR CUT.: Brand: BARD-PARKER ® SAFETYLOCK CARBON RIB-BACK BLADES

## (undated) DEVICE — STRETCH BANDAGE: Brand: CURITY

## (undated) DEVICE — NEEDLE 22 G X 1 1/2 SAFETY

## (undated) DEVICE — STANDARD SURGICAL GOWN, L: Brand: CONVERTORS

## (undated) DEVICE — DRAPE SHEET THREE QUARTER

## (undated) DEVICE — WET SKIN PREP TRAY: Brand: MEDLINE INDUSTRIES, INC.

## (undated) DEVICE — SUT VICRYL 2-0 SH 27 IN UNDYED J417H

## (undated) DEVICE — PLUMEPEN PRO 10FT

## (undated) DEVICE — DISPOSABLE OR TOWEL: Brand: CARDINAL HEALTH

## (undated) DEVICE — VIOLET BRAIDED (POLYGLACTIN 910), SYNTHETIC ABSORBABLE SUTURE: Brand: COATED VICRYL

## (undated) DEVICE — ACE WRAP 4 IN UNSTERILE

## (undated) DEVICE — GLOVE SRG BIOGEL 6

## (undated) DEVICE — NEEDLE 23G X 1 1/2 SAFETY-GLIDE THIN WALL

## (undated) DEVICE — 4-PORT MANIFOLD: Brand: NEPTUNE 2

## (undated) DEVICE — URETERAL DUAL LUMEN CATH

## (undated) DEVICE — GLOVE SRG BIOGEL 7

## (undated) DEVICE — CATH BAL DIL NEP 10MM 15CM X-FRC N30 WO INFL DEV

## (undated) DEVICE — INTENDED FOR TISSUE SEPARATION, AND OTHER PROCEDURES THAT REQUIRE A SHARP SURGICAL BLADE TO PUNCTURE OR CUT.: Brand: BARD-PARKER SAFETY BLADES SIZE 11, STERILE

## (undated) DEVICE — INLAY OPTIMA URETERAL STENT W/O GUIDEWIRE
Type: IMPLANTABLE DEVICE | Site: URETER | Status: NON-FUNCTIONAL
Brand: BARD® INLAY OPTIMA® URETERAL STENT

## (undated) DEVICE — MEDI-VAC YANKAUER SUCTION HANDLE W/BULBOUS AND CONTROL VENT: Brand: CARDINAL HEALTH

## (undated) DEVICE — PENCIL ELECTROSURG E-Z CLEAN -0035H

## (undated) DEVICE — CUFF TOURNIQUET 18 X 4 IN QUICK CONNECT DISP 1 BLADDER

## (undated) DEVICE — PAD CAST 4 IN COTTON NON STERILE

## (undated) DEVICE — INFLATION DEVICE BASIX 30ATM

## (undated) DEVICE — TUBING EXTENSION 6 FT CONTIN WAVE

## (undated) DEVICE — MEDI-VAC NON-CONDUCTIVE SUCTION TUBING: Brand: CARDINAL HEALTH

## (undated) DEVICE — BANDAGE, ESMARK LF STR 4"X9'(20/CS): Brand: CYPRESS

## (undated) DEVICE — C-ARM: Brand: UNBRANDED

## (undated) DEVICE — SYRINGE 20ML LL

## (undated) DEVICE — ACE WRAP 3 IN UNSTERILE

## (undated) DEVICE — PROBE KIT 3.9 X 350MM  SWISS LITHOCLAST TRILOGY

## (undated) DEVICE — TIBURON HAND DRAPE: Brand: CONVERTORS

## (undated) DEVICE — SUT ETHILON 2-0 FS 18 IN 664H

## (undated) DEVICE — DRAPE EQUIPMENT RF WAND

## (undated) DEVICE — SUT VICRYL 2-0 REEL 54 IN J286G

## (undated) DEVICE — ADHESIVE SKIN HIGH VISCOSITY EXOFIN 1ML

## (undated) DEVICE — PROBE KIT 3.9 X 440MM  SWISS LITHOCLAST TRILOGY

## (undated) DEVICE — CYSTO TUBING TUR Y IRRIGATION

## (undated) DEVICE — SINGLE PORT MANIFOLD: Brand: NEPTUNE 2

## (undated) DEVICE — PAD GROUNDING ADULT

## (undated) DEVICE — NEEDLE BLUNT 18 G X 1 1/2IN

## (undated) DEVICE — UNDYED BRAIDED (POLYGLACTIN 910), SYNTHETIC ABSORBABLE SUTURE: Brand: COATED VICRYL

## (undated) DEVICE — GLOVE INDICATOR PI UNDERGLOVE SZ 7.5 BLUE

## (undated) DEVICE — MAT ABSORBANT ARTHROSCOPY FLOOR 46 X 40 IN

## (undated) DEVICE — GLOVE SRG BIOGEL 7.5

## (undated) DEVICE — HYDROPHILIC WOUND DRESSING WITH ZINC PLUS VITAMINS A AND B6.: Brand: DERMAGRAN®-B

## (undated) DEVICE — GUIDEWIRE AMPLATZ .035 260CM 6CM ST SS

## (undated) RX ORDER — BROMFENAC SODIUM 0.7 MG/ML
1 SOLUTION/ DROPS OPHTHALMIC ONCE A DAY
End: 2021-09-23

## (undated) RX ORDER — ERYTHROMYCIN 5 MG/G: 1/4 OINTMENT OPHTHALMIC

## (undated) RX ORDER — DUREZOL 0.5 MG/ML: 1 EMULSION OPHTHALMIC

## (undated) RX ORDER — HYDROXYZINE HYDROCHLORIDE 25 MG/1: 1 TABLET, FILM COATED ORAL